# Patient Record
Sex: MALE | Race: WHITE | NOT HISPANIC OR LATINO | Employment: OTHER | URBAN - METROPOLITAN AREA
[De-identification: names, ages, dates, MRNs, and addresses within clinical notes are randomized per-mention and may not be internally consistent; named-entity substitution may affect disease eponyms.]

---

## 2017-01-17 PROBLEM — E11.00 TYPE 2 DIABETES MELLITUS WITH HYPEROSMOLARITY WITHOUT NONKETOTIC HYPERGLYCEMIC-HYPEROSMOLAR COMA (NKHHC) (HCC): Status: ACTIVE | Noted: 2017-01-17

## 2017-01-17 PROBLEM — I10 HYPERTENSION: Status: ACTIVE | Noted: 2017-01-17

## 2017-01-17 PROBLEM — Z91.14 NON COMPLIANCE W MEDICATION REGIMEN: Status: ACTIVE | Noted: 2017-01-17

## 2017-01-17 PROBLEM — K74.60 CIRRHOSIS (HCC): Status: ACTIVE | Noted: 2017-01-17

## 2017-01-17 PROBLEM — IMO0002 TYPE 1 DIABETES MELLITUS WITH NEUROLOGICAL MANIFESTATIONS, UNCONTROLLED: Status: ACTIVE | Noted: 2017-01-17

## 2017-01-17 PROBLEM — M19.90 ARTHRITIS: Status: ACTIVE | Noted: 2017-01-17

## 2017-01-17 PROBLEM — E78.5 HYPERLIPEMIA: Status: ACTIVE | Noted: 2017-01-17

## 2017-01-18 ENCOUNTER — OFFICE VISIT (OUTPATIENT)
Dept: ENDOCRINOLOGY | Facility: CLINIC | Age: 47
End: 2017-01-18

## 2017-01-18 VITALS
DIASTOLIC BLOOD PRESSURE: 96 MMHG | BODY MASS INDEX: 31.26 KG/M2 | HEART RATE: 94 BPM | OXYGEN SATURATION: 98 % | SYSTOLIC BLOOD PRESSURE: 154 MMHG | WEIGHT: 251.4 LBS | HEIGHT: 75 IN

## 2017-01-18 DIAGNOSIS — L97.509 DIABETIC FOOT ULCER ASSOCIATED WITH DIABETES MELLITUS DUE TO UNDERLYING CONDITION, UNSPECIFIED LATERALITY: ICD-10-CM

## 2017-01-18 DIAGNOSIS — E78.1 HYPERTRIGLYCERIDEMIA, ESSENTIAL: ICD-10-CM

## 2017-01-18 DIAGNOSIS — E08.621 DIABETIC FOOT ULCER ASSOCIATED WITH DIABETES MELLITUS DUE TO UNDERLYING CONDITION, UNSPECIFIED LATERALITY: ICD-10-CM

## 2017-01-18 DIAGNOSIS — E11.00 TYPE 2 DIABETES MELLITUS WITH HYPEROSMOLARITY WITHOUT NONKETOTIC HYPERGLYCEMIC-HYPEROSMOLAR COMA (NKHHC) (HCC): Primary | ICD-10-CM

## 2017-01-18 DIAGNOSIS — Z91.14 NON COMPLIANCE W MEDICATION REGIMEN: ICD-10-CM

## 2017-01-18 DIAGNOSIS — IMO0002 TYPE 2 DIABETES, UNCONTROLLED, WITH NEUROPATHY: ICD-10-CM

## 2017-01-18 PROCEDURE — 83036 HEMOGLOBIN GLYCOSYLATED A1C: CPT | Performed by: INTERNAL MEDICINE

## 2017-01-18 PROCEDURE — 99215 OFFICE O/P EST HI 40 MIN: CPT | Performed by: INTERNAL MEDICINE

## 2017-01-18 RX ORDER — INSULIN ASPART 100 [IU]/ML
5 INJECTION, SOLUTION INTRAVENOUS; SUBCUTANEOUS
COMMUNITY
Start: 2017-01-12 | End: 2017-02-03 | Stop reason: HOSPADM

## 2017-01-18 RX ORDER — GABAPENTIN 400 MG/1
1200 CAPSULE ORAL 3 TIMES DAILY
COMMUNITY
Start: 2017-01-05 | End: 2017-02-03 | Stop reason: HOSPADM

## 2017-01-18 RX ORDER — INSULIN GLARGINE 100 [IU]/ML
10 INJECTION, SOLUTION SUBCUTANEOUS 2 TIMES DAILY
COMMUNITY
End: 2017-01-18

## 2017-01-18 RX ORDER — BLOOD-GLUCOSE METER
KIT MISCELLANEOUS
COMMUNITY
Start: 2017-01-13 | End: 2017-01-25

## 2017-01-18 RX ORDER — HYDROCODONE BITARTRATE AND ACETAMINOPHEN 10; 325 MG/1; MG/1
1 TABLET ORAL 2 TIMES DAILY PRN
COMMUNITY
Start: 2017-01-17 | End: 2017-02-03 | Stop reason: HOSPADM

## 2017-01-18 NOTE — PATIENT INSTRUCTIONS
Diabetes Treatment Recommendations  Patient     Kendrick Crystal     Date:        01/18/17                               Your A1C is 14.9                                                             Glucose testing frequency:  2-3 times a day     Insulin dosing:  Basal insulin Lantus  /Toujeo  20 Units nightly. Increase by 2 units every 7 days until your morning numbers are < 200.              Meal Insulin Novolog   10 units before meal.     For snacks - take additional insulin if sugars are high.      Correction insulin (add to meal insulin or use with snacks)   0  unit  if BS  less than 250   4  unit   if BS  250 - 299   8  units if  - 349   10 units if  - 399   12 units if  - 449   15  units if BS Greater than 450                         Nutritional Recommendations:   4-5 carb portions per meal for male (60-75 gm of carbs)    Physical Activity Goals:  Walk at least 15 min every day, ideally exercise 45-60 min most days (could be done in short bouts of 10-15 minutes.    Keep records of your glucose levels and insulin adjustments. We may ask you to keep records on the content of your meals with insulin doses and before/after meal glucose levels to evaluate your ratios.  Call for advice if you have unexplained or unexpected hypoglycemia  (glucose < 60) or persistent high glucose > 300.  Office: [phone]      Callie Mcdonald MD

## 2017-01-18 NOTE — LETTER
Patient Instructions     Diabetes Treatment Recommendations  Patient     Kendrick Crystal     Date:        01/18/17                               Your A1C is 14.9                                                             Glucose testing frequency:  2-3 times a day     Insulin dosing:  Basal insulin Lantus  /Toujeo  20 Units nightly. Increase by 2 units every 7 days until your morning numbers are < 200.              Meal Insulin Novolog   10 units before meal.     For snacks - take additional insulin if sugars are high.      Correction insulin (add to meal insulin or use with snacks)   0  unit  if BS  less than 250   4  unit   if BS  250 - 299   8  units if  - 349   10 units if  - 399   12 units if  - 449   15  units if BS Greater than 450                         Nutritional Recommendations:   4-5 carb portions per meal for male (60-75 gm of carbs)    Physical Activity Goals:  Walk at least 15 min every day, ideally exercise 45-60 min most days (could be done in short bouts of 10-15 minutes.    Keep records of your glucose levels and insulin adjustments. We may ask you to keep records on the content of your meals with insulin doses and before/after meal glucose levels to evaluate your ratios.  Call for advice if you have unexplained or unexpected hypoglycemia  (glucose < 60) or persistent high glucose > 300.  Office: [phone]      MD Callie Chandra MD

## 2017-01-18 NOTE — MR AVS SNAPSHOT
Kendrick Crystal   1/18/2017 3:30 PM   Office Visit    Dept Phone:  [phone]   Encounter #:  [id number]    Provider:  Callie MCDONALD MD   Department:  [facility] INTERNAL MEDICINE AND ENDOCRINOLOGY                Your Full Care Plan              Today's Medication Changes          These changes are accurate as of: 1/18/17  3:16 PM.  If you have any questions, ask your nurse or doctor.               New Medication(s)Ordered:     Insulin Degludec 100 UNIT/ML solution pen-injector   Inject 20 Units under the skin Every Night.   Started by:  Callie MCDONALD MD         Stop taking medication(s)listed here:     insulin glargine 100 UNIT/ML injection   Commonly known as:  LANTUS   Stopped by:  Callie MCDONALD MD                Where to Get Your Medications      These medications were sent to [address] [place] [address] - [phone] [facility] [phone] [place] [address] [address] [address]     Phone:  [phone]     Insulin Degludec 100 UNIT/ML solution pen-injector                  Your Updated Medication List          This list is accurate as of: 1/18/17  3:16 PM.  Always use your most recent med list.                FREESTYLE LITE test strip   Generic drug:  glucose blood       gabapentin 400 MG capsule   Commonly known as:  NEURONTIN       HYDROcodone-acetaminophen  MG per tablet   Commonly known as:  NORCO       Insulin Degludec 100 UNIT/ML solution pen-injector   Inject 20 Units under the skin Every Night.       NOVOLOG FLEXPEN 100 UNIT/ML solution pen-injector sc pen   Generic drug:  insulin aspart               We Performed the Following     Ambulatory Referral to Endocrinology     Ambulatory Referral to Nephrology     POC Glucose Fingerstick     POC Glycosylated Hemoglobin (Hb A1C)     Wound Culture       You Were Diagnosed With        Codes Comments    Type 2 diabetes mellitus with hyperosmolarity  without nonketotic hyperglycemic-hyperosmolar coma (nkhhc)    -  Primary ICD-10-CM: E11.00  ICD-9-CM: 250.20     Type 2 diabetes, uncontrolled, with neuropathy     ICD-10-CM: E11.40, E11.65  ICD-9-CM: 250.62, 357.2     Hypertriglyceridemia, essential     ICD-10-CM: E78.1  ICD-9-CM: 272.1     Non compliance w medication regimen     ICD-10-CM: Z91.14  ICD-9-CM: V15.81       Instructions    Diabetes Treatment Recommendations  Patient     Kendrick Crystal     Date:        01/18/17                               Your A1C is 14.9                                                             Glucose testing frequency:  2-3 times a day     Insulin dosing:  Basal insulin Lantus  /Toujeo  20 Units nightly. Increase by 2 units every 7 days until your morning numbers are < 200.              Meal Insulin Novolog   10 units before meal.     For snacks - take additional insulin if sugars are high.      Correction insulin (add to meal insulin or use with snacks)   0  unit  if BS  less than 250   4  unit   if BS  250 - 299   8  units if  - 349   10 units if  - 399   12 units if  - 449   15  units if BS Greater than 450                         Nutritional Recommendations:   4-5 carb portions per meal for male (60-75 gm of carbs)    Physical Activity Goals:  Walk at least 15 min every day, ideally exercise 45-60 min most days (could be done in short bouts of 10-15 minutes.    Keep records of your glucose levels and insulin adjustments. We may ask you to keep records on the content of your meals with insulin doses and before/after meal glucose levels to evaluate your ratios.  Call for advice if you have unexplained or unexpected hypoglycemia  (glucose < 60) or persistent high glucose > 300.  Office: [phone]      Callie Mcdonald MD           Patient Instructions History      Upcoming Appointments     Visit Type Date Time Department    FOLLOW UP 1/18/2017  3:30 PM [facility]  "[employer] allows you to send messages to your doctor, view your test results, renew your prescriptions, schedule appointments, and more. To sign up, go to [employer] and click on the Sign Up Now link in the New User? box. Enter your [employer] Activation Code exactly as it appears below along with the last four digits of your Social Security Number and your Date of Birth () to complete the sign-up process. If you do not sign up before the expiration date, you must request a new code.    [employer] Activation Code: [email or web address]  Expires: 2017  3:16 PM    If you have questions, you can email [email or web address] or call [phone] to talk to our [employer] staff. Remember, [employer] is NOT to be used for urgent needs. For medical emergencies, dial 911.               Other Info from Your Visit           Your Appointments     2017  3:30 PM EST   Follow Up with Callie MCDONALD MD   [facility] INTERNAL MEDICINE AND ENDOCRINOLOGY (--)    [address] [place] [id number] [phone]           Arrive 15 minutes prior to appointment.              Allergies     No Known Allergies      Reason for Visit     Diabetes F/u for type 2 diabetes, testing 2x qd. Blood sugars ranging 300-400's      Vital Signs     Blood Pressure Pulse Height Weight Oxygen Saturation Body Mass Index    154/96 94 75\" (190.5 cm) 251 lb 6.4 oz (114 kg) 98% 31.42 kg/m2    Smoking Status                   Never Smoker           Problems and Diagnoses Noted     High blood triglycerides    Not taking medication as directed    Uncontrolled type I diabetes with nerve complications    Type 2 diabetes mellitus with hyperosmolarity without nonketotic hyperglycemic-hyperosmolar coma (nkhhc)        "

## 2017-01-18 NOTE — PROGRESS NOTES
Chief complaint  Diabetes (F/u for type 2 diabetes, testing 2x qd. Blood sugars ranging 300-400's)    Subjective   Kendrick Crystal is a 46 y.o. male is here today for follow-up.  Diabetes mellitus type 2, uncontrolled, with recent hospitalization with hyperglycemia after he stopped insulin.   Complications: neuropathy, retinopathy with left sided vision loss, feet ulcers.   Eye exam: Dr Jasmin Infante, received injections, right sided blurry vision, left side - near blindness.      Medications: He was on insulin pump in 2015. Patient has stopped insulin for about a year. His wife broke her leg and he lost follow-up.      He has lost weight, c/o polyuria and polydipsia, weakness, lethargy and confusion. Noticed foot ulcers with drainage on the left foot, lethargy, numbness of the feet, muscle weakness.     Monitoring:  He stopped checking  Nutrition: snacks through the day.  One large meal a day.   Meter: OneTouch.      Patient has a history of Non-alcoholic cirrhosis for unknown causes. He had liver biopsy in 10/03/2014 showing stage 4 fibrosis of the liver. He has multiple nodules on the liver and pancreas, LFT are stable on last GI visit.         Medications    Current Outpatient Prescriptions:   •  FREESTYLE LITE test strip, , Disp: , Rfl:   •  gabapentin (NEURONTIN) 400 MG capsule, , Disp: , Rfl:   •  HYDROcodone-acetaminophen (NORCO)  MG per tablet, , Disp: , Rfl:   •  Insulin Degludec 100 UNIT/ML solution pen-injector, Inject 20 Units under the skin Every Night., Disp: 5 pen, Rfl: 5  •  NOVOLOG FLEXPEN 100 UNIT/ML solution pen-injector sc pen, , Disp: , Rfl:     PMH  The following portions of the patient's history were reviewed and updated as appropriate: allergies, current medications, past family history, past medical history, past social history, past surgical history and problem list.    Review of systems  Review of Systems   Constitutional: Positive for chills, fatigue and unexpected weight change  "(Weight loss).   HENT: Positive for mouth sores.    Respiratory: Positive for shortness of breath.    Cardiovascular: Positive for leg swelling.   Gastrointestinal: Positive for diarrhea and nausea.   Endocrine: Positive for cold intolerance, heat intolerance, polydipsia and polyuria.   Musculoskeletal: Positive for arthralgias, back pain, gait problem and myalgias.   Neurological: Positive for dizziness, weakness, numbness and headaches.   Psychiatric/Behavioral: Positive for confusion, decreased concentration, dysphoric mood and sleep disturbance.   All other systems reviewed and are negative.      Physical exam  Objective   Blood pressure 154/96, pulse 94, height 75\" (190.5 cm), weight 251 lb 6.4 oz (114 kg), SpO2 98 %. Body mass index is 31.42 kg/(m^2).  Physical Exam   Constitutional: He is oriented to person, place, and time. He appears well-developed and well-nourished.   HENT:   Head: Normocephalic and atraumatic.   Nose: Nose normal.   Mouth/Throat: Oropharynx is clear and moist.   Eyes: Conjunctivae and EOM are normal. Pupils are equal, round, and reactive to light.   Neck: Normal range of motion. Neck supple. No thyromegaly present.   Cardiovascular: Normal rate, regular rhythm, normal heart sounds and intact distal pulses.    No murmur heard.  Pulmonary/Chest: Effort normal and breath sounds normal.   Abdominal: There is no hepatosplenomegaly.   No Hepatosplenomegaly   Musculoskeletal: Normal range of motion. He exhibits no edema, tenderness or deformity.    Kendrick had a diabetic foot exam performed today.   During the foot exam he had a monofilament test performed (numbness of both feet).    Neurological Sensory Findings - Altered hot/cold right ankle/foot discrimination and altered hot/cold left ankle/foot discrimination. Altered sharp/dull right ankle/foot discrimination and altered sharp/dull left ankle/foot discrimination.    Vascular Status -  His exam exhibits right foot vasculature normal and " right foot edema. His exam exhibits left foot vasculature normal and left foot edema.   Skin Integrity  -  His right foot skin is not intact (right 5 -7 mm dry ulcer with dark crust, no surrounding erythema and no discharge, ).     Kendrick 's left foot skin is not intact (7 mm left foot ulcers with yellowish discharge, the borders of the ulcer are erythematous. THe lesion is superficial. ). .     Lymphadenopathy:     He has no cervical adenopathy.   Neurological: He is alert and oriented to person, place, and time. He has normal reflexes.   Skin: Skin is warm and dry. No rash noted.   Psychiatric: He has a normal mood and affect. Judgment and thought content normal.   Nursing note and vitals reviewed.        LABS AND IMAGING  A1C was 14.9 in 1/2016        Assessment  Assessment/Plan   Problem List Items Addressed This Visit        Cardiovascular and Mediastinum    Hypertriglyceridemia, essential       Endocrine    Type 1 diabetes mellitus with neurological manifestations, uncontrolled    Relevant Medications    NOVOLOG FLEXPEN 100 UNIT/ML solution pen-injector sc pen    Insulin Degludec 100 UNIT/ML solution pen-injector    Type 2 diabetes mellitus with hyperosmolarity without nonketotic hyperglycemic-hyperosmolar coma (nkhhc) - Primary    Relevant Medications    NOVOLOG FLEXPEN 100 UNIT/ML solution pen-injector sc pen    Insulin Degludec 100 UNIT/ML solution pen-injector    Other Relevant Orders    POC Glucose Fingerstick    POC Glycosylated Hemoglobin (Hb A1C)    Ambulatory Referral to Nephrology    Comprehensive Metabolic Panel    Hemoglobin A1c    TSH    T4, Free    Microalbumin / Creatinine Urine Ratio    Ambulatory Referral to Endocrinology    Wound Culture    Ambulatory Referral to Podiatry    Diabetic foot ulcer associated with diabetes mellitus due to underlying condition    Relevant Medications    NOVOLOG FLEXPEN 100 UNIT/ML solution pen-injector sc pen    Insulin Degludec 100 UNIT/ML solution pen-injector        Other    Non-compliance          Plan  MDI started in the hospital but he didn't start taking insulin yet.     Start Tresiba  20 units at bedtime.   Novolog 10 units  With meal, additional correction.   He just got the medications and didn't start taking it. .   Meter provided.   Patient Instructions     Diabetes Treatment Recommendations  Patient     Kendrick Crystal     Date:        01/18/17                               Your A1C is 14.9                                                             Glucose testing frequency:  2-3 times a day     Insulin dosing:  Basal insulin Lantus  /Toujeo  20 Units nightly. Increase by 2 units every 7 days until your morning numbers are < 200.              Meal Insulin Novolog   10 units before meal.     For snacks - take additional insulin if sugars are high.      Correction insulin (add to meal insulin or use with snacks)   0  unit  if BS  less than 250   4  unit   if BS  250 - 299   8  units if  - 349   10 units if  - 399   12 units if  - 449   15  units if BS Greater than 450                         Nutritional Recommendations:   4-5 carb portions per meal for male (60-75 gm of carbs)    Physical Activity Goals:  Walk at least 15 min every day, ideally exercise 45-60 min most days (could be done in short bouts of 10-15 minutes.    Keep records of your glucose levels and insulin adjustments. We may ask you to keep records on the content of your meals with insulin doses and before/after meal glucose levels to evaluate your ratios.  Call for advice if you have unexplained or unexpected hypoglycemia  (glucose < 60) or persistent high glucose > 300.  Office: [phone]      Callie Mcdonald MD          I have reviewed diabetes in detail with the patient today. All questions have been answered to his satisfaction. We have discussed the goals of HgbA1C, glucose testing frequency, consistent carbohydrate diet  and diabetes complication prevention.   Patient was  recommended to increase exercise activity and follow ADA recommended diet. The literature with the examples of meal plan was provided to patient and healthy eating instructions reviewed.  We have discussed the following concepts:  long term diabetic complications discussed. , annual eye examinations at Ophthalmology discussed, foot care discussed and Podiatry evaluation recommended, dental hygiene discussed  and foot care reviewed., diabetic Sick Day rules reviewed, handout given: maintain increased fluid intake, check glucose frequently and call the office if glucose is >400. If nausea/vomiting occurs, present to the ER. , glucose meter dispensed to patient, home glucose monitoring demonstrated and taught, home glucose monitoring emphasized, all medications, side effects and compliance discussed carefully, use and side effects of insulin is taught, I will obtain fasting labs prior to next visit and Hypoglycemia management and prevention reviewed. His wife has asked about Genteel lancet device that is less painful. I have provided information of how to get it.     -refer to wound care sent.     I have obtained culture of the foot ulcer on the left, cleaned with hydrogen perxide and applied sterile gauge and dressing.       25 min  of 40 min face-to-face visit time spent for coordination of care and counselling regarding identified problems as outlined in the objective, assessment and discussion portions of the documentation.

## 2017-01-19 ENCOUNTER — CONVERSION ENCOUNTER (OUTPATIENT)
Dept: ENDOCRINOLOGY | Facility: CLINIC | Age: 47
End: 2017-01-19

## 2017-01-20 DIAGNOSIS — L97.509 DIABETIC FOOT ULCER ASSOCIATED WITH DIABETES MELLITUS DUE TO UNDERLYING CONDITION, UNSPECIFIED LATERALITY: Primary | ICD-10-CM

## 2017-01-20 DIAGNOSIS — E08.621 DIABETIC FOOT ULCER ASSOCIATED WITH DIABETES MELLITUS DUE TO UNDERLYING CONDITION, UNSPECIFIED LATERALITY: Primary | ICD-10-CM

## 2017-01-22 LAB
BACTERIA SPEC AEROBE CULT: ABNORMAL
BACTERIA SPEC CULT: ABNORMAL
BACTERIA SPEC CULT: ABNORMAL
Lab: NORMAL
OTHER ANTIBIOTIC SUSC ISLT: ABNORMAL
REQUEST PROBLEM: NORMAL

## 2017-01-23 LAB — HBA1C MFR BLD: 14.9 %

## 2017-01-24 ENCOUNTER — HOSPITAL ENCOUNTER (OUTPATIENT)
Dept: PHYSICAL THERAPY | Facility: HOSPITAL | Age: 47
Setting detail: THERAPIES SERIES
Discharge: HOME | End: 2017-01-24

## 2017-01-25 ENCOUNTER — APPOINTMENT (OUTPATIENT)
Dept: CT IMAGING | Facility: HOSPITAL | Age: 47
End: 2017-01-25

## 2017-01-25 ENCOUNTER — APPOINTMENT (OUTPATIENT)
Dept: MRI IMAGING | Facility: HOSPITAL | Age: 47
End: 2017-01-25

## 2017-01-25 ENCOUNTER — HOSPITAL ENCOUNTER (INPATIENT)
Facility: HOSPITAL | Age: 47
LOS: 9 days | Discharge: NURSING, REHAB OR LONG-TERM CARE | End: 2017-02-03
Attending: EMERGENCY MEDICINE | Admitting: INTERNAL MEDICINE

## 2017-01-25 ENCOUNTER — TELEPHONE (OUTPATIENT)
Dept: ENDOCRINOLOGY | Facility: CLINIC | Age: 47
End: 2017-01-25

## 2017-01-25 DIAGNOSIS — S91.302A OPEN WOUND OF LEFT FOOT WITH COMPLICATION, INITIAL ENCOUNTER: ICD-10-CM

## 2017-01-25 DIAGNOSIS — L03.116 CELLULITIS OF LEFT FOOT: Primary | ICD-10-CM

## 2017-01-25 PROBLEM — E87.1 HYPONATREMIA: Status: ACTIVE | Noted: 2017-01-25

## 2017-01-25 PROBLEM — E11.65 POORLY CONTROLLED TYPE 2 DIABETES MELLITUS WITH PERIPHERAL NEUROPATHY (HCC): Chronic | Status: ACTIVE | Noted: 2017-01-25

## 2017-01-25 PROBLEM — K75.81 NASH (NONALCOHOLIC STEATOHEPATITIS): Status: ACTIVE | Noted: 2017-01-25

## 2017-01-25 PROBLEM — D72.9 NEUTROPHILIC LEUKOCYTOSIS: Status: ACTIVE | Noted: 2017-01-25

## 2017-01-25 PROBLEM — Z79.4 TYPE 2 DIABETES MELLITUS WITH FOOT ULCER, WITH LONG-TERM CURRENT USE OF INSULIN (HCC): Status: ACTIVE | Noted: 2017-01-25

## 2017-01-25 PROBLEM — L97.509 TYPE 2 DIABETES MELLITUS WITH FOOT ULCER, WITH LONG-TERM CURRENT USE OF INSULIN (HCC): Status: ACTIVE | Noted: 2017-01-25

## 2017-01-25 PROBLEM — E11.42 POORLY CONTROLLED TYPE 2 DIABETES MELLITUS WITH PERIPHERAL NEUROPATHY (HCC): Chronic | Status: ACTIVE | Noted: 2017-01-25

## 2017-01-25 PROBLEM — E11.621 TYPE 2 DIABETES MELLITUS WITH FOOT ULCER, WITH LONG-TERM CURRENT USE OF INSULIN (HCC): Status: ACTIVE | Noted: 2017-01-25

## 2017-01-25 LAB
ALBUMIN SERPL-MCNC: 4 G/DL (ref 3.2–4.8)
ALBUMIN/GLOB SERPL: 1.4 G/DL (ref 1.5–2.5)
ALP SERPL-CCNC: 138 U/L (ref 25–100)
ALT SERPL W P-5'-P-CCNC: 21 U/L (ref 7–40)
ANION GAP SERPL CALCULATED.3IONS-SCNC: 12 MMOL/L (ref 3–11)
APTT PPP: 30.4 SECONDS (ref 24–31)
AST SERPL-CCNC: 13 U/L (ref 0–33)
BACTERIA UR QL AUTO: ABNORMAL /HPF
BASOPHILS # BLD AUTO: 0.03 10*3/MM3 (ref 0–0.2)
BASOPHILS NFR BLD AUTO: 0.1 % (ref 0–1)
BILIRUB SERPL-MCNC: 1.2 MG/DL (ref 0.3–1.2)
BILIRUB UR QL STRIP: ABNORMAL
BUN BLD-MCNC: 14 MG/DL (ref 9–23)
BUN/CREAT SERPL: 20 (ref 7–25)
CA-I SERPL ISE-MCNC: 1.14 MMOL/L (ref 1.12–1.32)
CALCIUM SPEC-SCNC: 9.5 MG/DL (ref 8.7–10.4)
CHLORIDE SERPL-SCNC: 93 MMOL/L (ref 99–109)
CLARITY UR: CLEAR
CO2 SERPL-SCNC: 27 MMOL/L (ref 20–31)
COLOR UR: ABNORMAL
CREAT BLD-MCNC: 0.7 MG/DL (ref 0.6–1.3)
CRP SERPL-MCNC: 188.4 MG/DL (ref 0–10)
D-LACTATE SERPL-SCNC: 1.3 MMOL/L (ref 0.5–2)
D-LACTATE SERPL-SCNC: 2 MMOL/L (ref 0.5–2)
DEPRECATED RDW RBC AUTO: 38.9 FL (ref 37–54)
EOSINOPHIL # BLD AUTO: 0.01 10*3/MM3 (ref 0.1–0.3)
EOSINOPHIL NFR BLD AUTO: 0 % (ref 0–3)
ERYTHROCYTE [DISTWIDTH] IN BLOOD BY AUTOMATED COUNT: 13 % (ref 11.3–14.5)
ERYTHROCYTE [SEDIMENTATION RATE] IN BLOOD: 61 MM/HR (ref 0–15)
GFR SERPL CREATININE-BSD FRML MDRD: 120 ML/MIN/1.73
GLOBULIN UR ELPH-MCNC: 2.8 GM/DL
GLUCOSE BLD-MCNC: 360 MG/DL (ref 70–100)
GLUCOSE BLDC GLUCOMTR-MCNC: 161 MG/DL (ref 70–130)
GLUCOSE BLDC GLUCOMTR-MCNC: 275 MG/DL (ref 70–130)
GLUCOSE BLDC GLUCOMTR-MCNC: 316 MG/DL (ref 70–130)
GLUCOSE BLDC GLUCOMTR-MCNC: 353 MG/DL (ref 70–130)
GLUCOSE UR STRIP-MCNC: ABNORMAL MG/DL
HBA1C MFR BLD: 13.5 % (ref 4.8–5.6)
HCT VFR BLD AUTO: 41.5 % (ref 38.9–50.9)
HGB BLD-MCNC: 14.7 G/DL (ref 13.1–17.5)
HGB UR QL STRIP.AUTO: NEGATIVE
HOLD SPECIMEN: NORMAL
HYALINE CASTS UR QL AUTO: ABNORMAL /LPF
IMM GRANULOCYTES # BLD: 0.1 10*3/MM3 (ref 0–0.03)
IMM GRANULOCYTES NFR BLD: 0.5 % (ref 0–0.6)
INR PPP: 1.04
KETONES UR QL STRIP: ABNORMAL
LEUKOCYTE ESTERASE UR QL STRIP.AUTO: NEGATIVE
LYMPHOCYTES # BLD AUTO: 1.56 10*3/MM3 (ref 0.6–4.8)
LYMPHOCYTES NFR BLD AUTO: 7.4 % (ref 24–44)
MAGNESIUM SERPL-MCNC: 1.7 MG/DL (ref 1.3–2.7)
MCH RBC QN AUTO: 29.5 PG (ref 27–31)
MCHC RBC AUTO-ENTMCNC: 35.4 G/DL (ref 32–36)
MCV RBC AUTO: 83.3 FL (ref 80–99)
MONOCYTES # BLD AUTO: 1.37 10*3/MM3 (ref 0–1)
MONOCYTES NFR BLD AUTO: 6.5 % (ref 0–12)
NEUTROPHILS # BLD AUTO: 18.04 10*3/MM3 (ref 1.5–8.3)
NEUTROPHILS NFR BLD AUTO: 85.5 % (ref 41–71)
NITRITE UR QL STRIP: NEGATIVE
OSMOLALITY SERPL: 289 MOSM/KG (ref 275–295)
OSMOLALITY UR: 849 MOSM/KG (ref 300–1100)
PH UR STRIP.AUTO: <=5 [PH] (ref 5–8)
PHOSPHATE SERPL-MCNC: 3 MG/DL (ref 2.4–5.1)
PLAT MORPH BLD: NORMAL
PLATELET # BLD AUTO: 198 10*3/MM3 (ref 150–450)
PMV BLD AUTO: 11.4 FL (ref 6–12)
POTASSIUM BLD-SCNC: 3.7 MMOL/L (ref 3.5–5.5)
PROCALCITONIN SERPL-MCNC: 0.25 NG/ML
PROT SERPL-MCNC: 6.8 G/DL (ref 5.7–8.2)
PROT UR QL STRIP: ABNORMAL
PROTHROMBIN TIME: 11.4 SECONDS (ref 9.6–11.5)
RBC # BLD AUTO: 4.98 10*6/MM3 (ref 4.2–5.76)
RBC # UR: ABNORMAL /HPF
RBC MORPH BLD: NORMAL
REF LAB TEST METHOD: ABNORMAL
SODIUM BLD-SCNC: 132 MMOL/L (ref 132–146)
SODIUM UR-SCNC: 19 MMOL/L (ref 30–90)
SP GR UR STRIP: >1.05 (ref 1–1.03)
SQUAMOUS #/AREA URNS HPF: ABNORMAL /HPF
UROBILINOGEN UR QL STRIP: ABNORMAL
WBC MORPH BLD: NORMAL
WBC NRBC COR # BLD: 21.11 10*3/MM3 (ref 3.5–10.8)
WBC UR QL AUTO: ABNORMAL /HPF
WHOLE BLOOD HOLD SPECIMEN: NORMAL
WHOLE BLOOD HOLD SPECIMEN: NORMAL

## 2017-01-25 PROCEDURE — 85007 BL SMEAR W/DIFF WBC COUNT: CPT

## 2017-01-25 PROCEDURE — 82962 GLUCOSE BLOOD TEST: CPT

## 2017-01-25 PROCEDURE — 83605 ASSAY OF LACTIC ACID: CPT | Performed by: PHYSICIAN ASSISTANT

## 2017-01-25 PROCEDURE — 93005 ELECTROCARDIOGRAM TRACING: CPT | Performed by: PHYSICIAN ASSISTANT

## 2017-01-25 PROCEDURE — A9577 INJ MULTIHANCE: HCPCS | Performed by: FAMILY MEDICINE

## 2017-01-25 PROCEDURE — 63710000001 INSULIN LISPRO (HUMAN) PER 5 UNITS: Performed by: PHYSICIAN ASSISTANT

## 2017-01-25 PROCEDURE — 85610 PROTHROMBIN TIME: CPT | Performed by: PHYSICIAN ASSISTANT

## 2017-01-25 PROCEDURE — 85652 RBC SED RATE AUTOMATED: CPT | Performed by: EMERGENCY MEDICINE

## 2017-01-25 PROCEDURE — 86140 C-REACTIVE PROTEIN: CPT | Performed by: EMERGENCY MEDICINE

## 2017-01-25 PROCEDURE — 99223 1ST HOSP IP/OBS HIGH 75: CPT | Performed by: FAMILY MEDICINE

## 2017-01-25 PROCEDURE — 73701 CT LOWER EXTREMITY W/DYE: CPT

## 2017-01-25 PROCEDURE — 84145 PROCALCITONIN (PCT): CPT | Performed by: PHYSICIAN ASSISTANT

## 2017-01-25 PROCEDURE — 85025 COMPLETE CBC W/AUTO DIFF WBC: CPT

## 2017-01-25 PROCEDURE — 84300 ASSAY OF URINE SODIUM: CPT | Performed by: PHYSICIAN ASSISTANT

## 2017-01-25 PROCEDURE — 85730 THROMBOPLASTIN TIME PARTIAL: CPT | Performed by: PHYSICIAN ASSISTANT

## 2017-01-25 PROCEDURE — 73720 MRI LWR EXTREMITY W/O&W/DYE: CPT

## 2017-01-25 PROCEDURE — 25010000002 HEPARIN (PORCINE) PER 1000 UNITS: Performed by: PHYSICIAN ASSISTANT

## 2017-01-25 PROCEDURE — 25010000002 VANCOMYCIN: Performed by: EMERGENCY MEDICINE

## 2017-01-25 PROCEDURE — 99284 EMERGENCY DEPT VISIT MOD MDM: CPT

## 2017-01-25 PROCEDURE — 80053 COMPREHEN METABOLIC PANEL: CPT

## 2017-01-25 PROCEDURE — 87040 BLOOD CULTURE FOR BACTERIA: CPT

## 2017-01-25 PROCEDURE — 82330 ASSAY OF CALCIUM: CPT | Performed by: PHYSICIAN ASSISTANT

## 2017-01-25 PROCEDURE — 83605 ASSAY OF LACTIC ACID: CPT

## 2017-01-25 PROCEDURE — 81001 URINALYSIS AUTO W/SCOPE: CPT | Performed by: PHYSICIAN ASSISTANT

## 2017-01-25 PROCEDURE — 83930 ASSAY OF BLOOD OSMOLALITY: CPT | Performed by: PHYSICIAN ASSISTANT

## 2017-01-25 PROCEDURE — 0 GADOBENATE DIMEGLUMINE 529 MG/ML SOLUTION: Performed by: FAMILY MEDICINE

## 2017-01-25 PROCEDURE — 84100 ASSAY OF PHOSPHORUS: CPT | Performed by: PHYSICIAN ASSISTANT

## 2017-01-25 PROCEDURE — 83036 HEMOGLOBIN GLYCOSYLATED A1C: CPT | Performed by: PHYSICIAN ASSISTANT

## 2017-01-25 PROCEDURE — 25010000003 CEFTRIAXONE PER 250 MG: Performed by: EMERGENCY MEDICINE

## 2017-01-25 PROCEDURE — 83935 ASSAY OF URINE OSMOLALITY: CPT | Performed by: PHYSICIAN ASSISTANT

## 2017-01-25 PROCEDURE — 0 IOPAMIDOL 61 % SOLUTION: Performed by: EMERGENCY MEDICINE

## 2017-01-25 PROCEDURE — 83735 ASSAY OF MAGNESIUM: CPT | Performed by: PHYSICIAN ASSISTANT

## 2017-01-25 PROCEDURE — 63710000001 INSULIN DETEMIR PER 5 UNITS: Performed by: PHYSICIAN ASSISTANT

## 2017-01-25 RX ORDER — VANCOMYCIN HYDROCHLORIDE 1 G/200ML
1000 INJECTION, SOLUTION INTRAVENOUS EVERY 12 HOURS
Status: DISCONTINUED | OUTPATIENT
Start: 2017-01-25 | End: 2017-01-25

## 2017-01-25 RX ORDER — SODIUM CHLORIDE 9 MG/ML
75 INJECTION, SOLUTION INTRAVENOUS CONTINUOUS
Status: ACTIVE | OUTPATIENT
Start: 2017-01-25 | End: 2017-01-26

## 2017-01-25 RX ORDER — POTASSIUM CHLORIDE 750 MG/1
40 CAPSULE, EXTENDED RELEASE ORAL AS NEEDED
Status: DISCONTINUED | OUTPATIENT
Start: 2017-01-25 | End: 2017-01-29

## 2017-01-25 RX ORDER — NICOTINE POLACRILEX 4 MG
15 LOZENGE BUCCAL
Status: DISCONTINUED | OUTPATIENT
Start: 2017-01-25 | End: 2017-02-03 | Stop reason: HOSPADM

## 2017-01-25 RX ORDER — ONDANSETRON 4 MG/1
4 TABLET, FILM COATED ORAL EVERY 6 HOURS PRN
Status: DISCONTINUED | OUTPATIENT
Start: 2017-01-25 | End: 2017-01-30 | Stop reason: SDUPTHER

## 2017-01-25 RX ORDER — SULFAMETHOXAZOLE AND TRIMETHOPRIM 800; 160 MG/1; MG/1
1 TABLET ORAL 2 TIMES DAILY
COMMUNITY
Start: 2017-01-19 | End: 2017-02-03 | Stop reason: HOSPADM

## 2017-01-25 RX ORDER — FAMOTIDINE 20 MG/1
20 TABLET, FILM COATED ORAL 2 TIMES DAILY
Status: DISCONTINUED | OUTPATIENT
Start: 2017-01-25 | End: 2017-02-03 | Stop reason: HOSPADM

## 2017-01-25 RX ORDER — CEFTRIAXONE SODIUM 2 G/50ML
2 INJECTION, SOLUTION INTRAVENOUS EVERY 24 HOURS
Status: DISCONTINUED | OUTPATIENT
Start: 2017-01-26 | End: 2017-01-26

## 2017-01-25 RX ORDER — GABAPENTIN 300 MG/1
1200 CAPSULE ORAL 3 TIMES DAILY
Status: DISCONTINUED | OUTPATIENT
Start: 2017-01-25 | End: 2017-02-03 | Stop reason: HOSPADM

## 2017-01-25 RX ORDER — DEXTROSE MONOHYDRATE 25 G/50ML
25 INJECTION, SOLUTION INTRAVENOUS
Status: DISCONTINUED | OUTPATIENT
Start: 2017-01-25 | End: 2017-02-03 | Stop reason: HOSPADM

## 2017-01-25 RX ORDER — SODIUM CHLORIDE 0.9 % (FLUSH) 0.9 %
1-10 SYRINGE (ML) INJECTION AS NEEDED
Status: DISCONTINUED | OUTPATIENT
Start: 2017-01-25 | End: 2017-02-03 | Stop reason: HOSPADM

## 2017-01-25 RX ORDER — VANCOMYCIN HYDROCHLORIDE 1 G/200ML
1000 INJECTION, SOLUTION INTRAVENOUS EVERY 12 HOURS
Status: DISCONTINUED | OUTPATIENT
Start: 2017-01-26 | End: 2017-01-31

## 2017-01-25 RX ORDER — CEFTRIAXONE SODIUM 2 G/50ML
2 INJECTION, SOLUTION INTRAVENOUS ONCE
Status: COMPLETED | OUTPATIENT
Start: 2017-01-25 | End: 2017-01-25

## 2017-01-25 RX ORDER — DOCUSATE SODIUM 100 MG/1
100 CAPSULE, LIQUID FILLED ORAL 2 TIMES DAILY
Status: DISCONTINUED | OUTPATIENT
Start: 2017-01-25 | End: 2017-02-03 | Stop reason: HOSPADM

## 2017-01-25 RX ORDER — ONDANSETRON 2 MG/ML
4 INJECTION INTRAMUSCULAR; INTRAVENOUS EVERY 6 HOURS PRN
Status: DISCONTINUED | OUTPATIENT
Start: 2017-01-25 | End: 2017-01-30 | Stop reason: SDUPTHER

## 2017-01-25 RX ORDER — HEPARIN SODIUM 5000 [USP'U]/ML
5000 INJECTION, SOLUTION INTRAVENOUS; SUBCUTANEOUS EVERY 12 HOURS SCHEDULED
Status: DISCONTINUED | OUTPATIENT
Start: 2017-01-25 | End: 2017-01-30 | Stop reason: SDUPTHER

## 2017-01-25 RX ORDER — POTASSIUM CHLORIDE 1.5 G/1.77G
40 POWDER, FOR SOLUTION ORAL AS NEEDED
Status: DISCONTINUED | OUTPATIENT
Start: 2017-01-25 | End: 2017-01-29

## 2017-01-25 RX ORDER — SODIUM CHLORIDE 0.9 % (FLUSH) 0.9 %
10 SYRINGE (ML) INJECTION AS NEEDED
Status: DISCONTINUED | OUTPATIENT
Start: 2017-01-25 | End: 2017-01-30 | Stop reason: SDUPTHER

## 2017-01-25 RX ORDER — HYDROCODONE BITARTRATE AND ACETAMINOPHEN 10; 325 MG/1; MG/1
1 TABLET ORAL EVERY 6 HOURS PRN
Status: DISCONTINUED | OUTPATIENT
Start: 2017-01-25 | End: 2017-02-03 | Stop reason: HOSPADM

## 2017-01-25 RX ORDER — POTASSIUM CHLORIDE 7.45 MG/ML
10 INJECTION INTRAVENOUS
Status: DISCONTINUED | OUTPATIENT
Start: 2017-01-25 | End: 2017-01-29

## 2017-01-25 RX ADMIN — INSULIN DETEMIR 10 UNITS: 100 INJECTION, SOLUTION SUBCUTANEOUS at 20:55

## 2017-01-25 RX ADMIN — VANCOMYCIN HYDROCHLORIDE 2000 MG: 1 INJECTION, POWDER, LYOPHILIZED, FOR SOLUTION INTRAVENOUS at 13:31

## 2017-01-25 RX ADMIN — HEPARIN SODIUM 5000 UNITS: 5000 INJECTION, SOLUTION INTRAVENOUS; SUBCUTANEOUS at 20:56

## 2017-01-25 RX ADMIN — CEFTRIAXONE SODIUM 2 G: 2 INJECTION, SOLUTION INTRAVENOUS at 13:19

## 2017-01-25 RX ADMIN — IOPAMIDOL 95 ML: 612 INJECTION, SOLUTION INTRAVENOUS at 13:00

## 2017-01-25 RX ADMIN — GADOBENATE DIMEGLUMINE 20 ML: 529 INJECTION, SOLUTION INTRAVENOUS at 18:50

## 2017-01-25 RX ADMIN — GABAPENTIN 1200 MG: 300 CAPSULE ORAL at 20:56

## 2017-01-25 RX ADMIN — FAMOTIDINE 20 MG: 20 TABLET ORAL at 20:56

## 2017-01-25 RX ADMIN — INSULIN LISPRO 6 UNITS: 100 INJECTION, SOLUTION INTRAVENOUS; SUBCUTANEOUS at 20:55

## 2017-01-25 RX ADMIN — SODIUM CHLORIDE 125 ML/HR: 9 INJECTION, SOLUTION INTRAVENOUS at 20:20

## 2017-01-25 RX ADMIN — HYDROCODONE BITARTRATE AND ACETAMINOPHEN 1 TABLET: 10; 325 TABLET ORAL at 17:49

## 2017-01-25 NOTE — TELEPHONE ENCOUNTER
If he has signs of worsening infection, then waiting even 1 day could lead to complications. Oral abx not always effective in tx wound infection. He still has to go to the ER

## 2017-01-25 NOTE — TELEPHONE ENCOUNTER
Called and informed wife to take pt to ER, wife expressed verbal understanding and stated that pt still has three more days left of Bactrim antibiotic. SKY

## 2017-01-25 NOTE — TELEPHONE ENCOUNTER
Received call from pt's wife Cindy pt was seen last on 01/18/17, he has a wound on his left leg, per wife the wound has gotten worse since the office visit, it has been draining profusely; it spread to the small toe, the toe turned black and has an odor to it. Pt has been in bed for the last two days he is vomiting and is running a low grade fever. Pt had an appt with wound care yesterday but was too sick to make it, so they rescheduled it to tomorrow, wife is really worried and wants to know what they should do right now. Pls advise.

## 2017-01-26 ENCOUNTER — APPOINTMENT (OUTPATIENT)
Dept: PHYSICAL THERAPY | Facility: HOSPITAL | Age: 47
End: 2017-01-26

## 2017-01-26 LAB
ALBUMIN SERPL-MCNC: 3.4 G/DL (ref 3.2–4.8)
ALBUMIN/GLOB SERPL: 1.2 G/DL (ref 1.5–2.5)
ALP SERPL-CCNC: 125 U/L (ref 25–100)
ALT SERPL W P-5'-P-CCNC: 18 U/L (ref 7–40)
ANION GAP SERPL CALCULATED.3IONS-SCNC: 12 MMOL/L (ref 3–11)
AST SERPL-CCNC: 16 U/L (ref 0–33)
BASOPHILS # BLD AUTO: 0.03 10*3/MM3 (ref 0–0.2)
BASOPHILS NFR BLD AUTO: 0.2 % (ref 0–1)
BILIRUB SERPL-MCNC: 0.4 MG/DL (ref 0.3–1.2)
BUN BLD-MCNC: 16 MG/DL (ref 9–23)
BUN/CREAT SERPL: 26.7 (ref 7–25)
CALCIUM SPEC-SCNC: 8.6 MG/DL (ref 8.7–10.4)
CHLORIDE SERPL-SCNC: 94 MMOL/L (ref 99–109)
CO2 SERPL-SCNC: 23 MMOL/L (ref 20–31)
CREAT BLD-MCNC: 0.6 MG/DL (ref 0.6–1.3)
DEPRECATED RDW RBC AUTO: 39.4 FL (ref 37–54)
EOSINOPHIL # BLD AUTO: 0.03 10*3/MM3 (ref 0.1–0.3)
EOSINOPHIL NFR BLD AUTO: 0.2 % (ref 0–3)
ERYTHROCYTE [DISTWIDTH] IN BLOOD BY AUTOMATED COUNT: 13 % (ref 11.3–14.5)
GFR SERPL CREATININE-BSD FRML MDRD: 144 ML/MIN/1.73
GLOBULIN UR ELPH-MCNC: 2.9 GM/DL
GLUCOSE BLD-MCNC: 340 MG/DL (ref 70–100)
GLUCOSE BLDC GLUCOMTR-MCNC: 105 MG/DL (ref 70–130)
GLUCOSE BLDC GLUCOMTR-MCNC: 294 MG/DL (ref 70–130)
GLUCOSE BLDC GLUCOMTR-MCNC: 299 MG/DL (ref 70–130)
GLUCOSE BLDC GLUCOMTR-MCNC: 330 MG/DL (ref 70–130)
GLUCOSE BLDC GLUCOMTR-MCNC: 444 MG/DL (ref 70–130)
HCT VFR BLD AUTO: 36.8 % (ref 38.9–50.9)
HGB BLD-MCNC: 12.7 G/DL (ref 13.1–17.5)
IMM GRANULOCYTES # BLD: 0.07 10*3/MM3 (ref 0–0.03)
IMM GRANULOCYTES NFR BLD: 0.4 % (ref 0–0.6)
LYMPHOCYTES # BLD AUTO: 2.08 10*3/MM3 (ref 0.6–4.8)
LYMPHOCYTES NFR BLD AUTO: 11.2 % (ref 24–44)
MCH RBC QN AUTO: 28.7 PG (ref 27–31)
MCHC RBC AUTO-ENTMCNC: 34.5 G/DL (ref 32–36)
MCV RBC AUTO: 83.3 FL (ref 80–99)
MONOCYTES # BLD AUTO: 1.15 10*3/MM3 (ref 0–1)
MONOCYTES NFR BLD AUTO: 6.2 % (ref 0–12)
NEUTROPHILS # BLD AUTO: 15.17 10*3/MM3 (ref 1.5–8.3)
NEUTROPHILS NFR BLD AUTO: 81.8 % (ref 41–71)
PLAT MORPH BLD: NORMAL
PLATELET # BLD AUTO: 167 10*3/MM3 (ref 150–450)
PMV BLD AUTO: 11.8 FL (ref 6–12)
POTASSIUM BLD-SCNC: 3.5 MMOL/L (ref 3.5–5.5)
PROT SERPL-MCNC: 6.3 G/DL (ref 5.7–8.2)
RBC # BLD AUTO: 4.42 10*6/MM3 (ref 4.2–5.76)
RBC MORPH BLD: NORMAL
SODIUM BLD-SCNC: 129 MMOL/L (ref 132–146)
WBC MORPH BLD: NORMAL
WBC NRBC COR # BLD: 18.53 10*3/MM3 (ref 3.5–10.8)

## 2017-01-26 PROCEDURE — 99253 IP/OBS CNSLTJ NEW/EST LOW 45: CPT | Performed by: THORACIC SURGERY (CARDIOTHORACIC VASCULAR SURGERY)

## 2017-01-26 PROCEDURE — 63710000001 INSULIN LISPRO (HUMAN) PER 5 UNITS: Performed by: INTERNAL MEDICINE

## 2017-01-26 PROCEDURE — 25010000002 PIPERACILLIN SOD-TAZOBACTAM PER 1 G: Performed by: INTERNAL MEDICINE

## 2017-01-26 PROCEDURE — 25010000003 CEFTRIAXONE PER 250 MG: Performed by: PHYSICIAN ASSISTANT

## 2017-01-26 PROCEDURE — 25010000002 VANCOMYCIN PER 500 MG

## 2017-01-26 PROCEDURE — 80053 COMPREHEN METABOLIC PANEL: CPT | Performed by: PHYSICIAN ASSISTANT

## 2017-01-26 PROCEDURE — 82962 GLUCOSE BLOOD TEST: CPT

## 2017-01-26 PROCEDURE — 25010000002 HEPARIN (PORCINE) PER 1000 UNITS: Performed by: PHYSICIAN ASSISTANT

## 2017-01-26 PROCEDURE — 63710000001 INSULIN LISPRO (HUMAN) PER 5 UNITS: Performed by: PHYSICIAN ASSISTANT

## 2017-01-26 PROCEDURE — 85007 BL SMEAR W/DIFF WBC COUNT: CPT | Performed by: FAMILY MEDICINE

## 2017-01-26 PROCEDURE — 85025 COMPLETE CBC W/AUTO DIFF WBC: CPT | Performed by: FAMILY MEDICINE

## 2017-01-26 PROCEDURE — 99232 SBSQ HOSP IP/OBS MODERATE 35: CPT | Performed by: INTERNAL MEDICINE

## 2017-01-26 PROCEDURE — 63710000001 INSULIN DETEMIR PER 5 UNITS: Performed by: INTERNAL MEDICINE

## 2017-01-26 PROCEDURE — 93010 ELECTROCARDIOGRAM REPORT: CPT | Performed by: INTERNAL MEDICINE

## 2017-01-26 RX ORDER — LACTOBACILLUS RHAMNOSUS GG 10B CELL
1 CAPSULE ORAL DAILY
Status: DISCONTINUED | OUTPATIENT
Start: 2017-01-26 | End: 2017-02-03 | Stop reason: HOSPADM

## 2017-01-26 RX ORDER — POVIDONE-IODINE 10 MG/G
OINTMENT TOPICAL DAILY
Status: DISCONTINUED | OUTPATIENT
Start: 2017-01-26 | End: 2017-02-03 | Stop reason: HOSPADM

## 2017-01-26 RX ADMIN — TAZOBACTAM SODIUM AND PIPERACILLIN SODIUM 4.5 G: 500; 4 INJECTION, SOLUTION INTRAVENOUS at 15:47

## 2017-01-26 RX ADMIN — GABAPENTIN 1200 MG: 300 CAPSULE ORAL at 21:25

## 2017-01-26 RX ADMIN — DOCUSATE SODIUM 100 MG: 100 CAPSULE, LIQUID FILLED ORAL at 17:05

## 2017-01-26 RX ADMIN — HYDROCODONE BITARTRATE AND ACETAMINOPHEN 1 TABLET: 10; 325 TABLET ORAL at 21:25

## 2017-01-26 RX ADMIN — HEPARIN SODIUM 5000 UNITS: 5000 INJECTION, SOLUTION INTRAVENOUS; SUBCUTANEOUS at 21:25

## 2017-01-26 RX ADMIN — GABAPENTIN 1200 MG: 300 CAPSULE ORAL at 15:52

## 2017-01-26 RX ADMIN — INSULIN LISPRO 10 UNITS: 100 INJECTION, SOLUTION INTRAVENOUS; SUBCUTANEOUS at 12:05

## 2017-01-26 RX ADMIN — TAZOBACTAM SODIUM AND PIPERACILLIN SODIUM 4.5 G: 500; 4 INJECTION, SOLUTION INTRAVENOUS at 21:26

## 2017-01-26 RX ADMIN — FAMOTIDINE 20 MG: 20 TABLET ORAL at 17:05

## 2017-01-26 RX ADMIN — HYDROCODONE BITARTRATE AND ACETAMINOPHEN 1 TABLET: 10; 325 TABLET ORAL at 14:07

## 2017-01-26 RX ADMIN — VANCOMYCIN HYDROCHLORIDE 1000 MG: 1 INJECTION, SOLUTION INTRAVENOUS at 01:53

## 2017-01-26 RX ADMIN — VANCOMYCIN HYDROCHLORIDE 1000 MG: 1 INJECTION, SOLUTION INTRAVENOUS at 12:05

## 2017-01-26 RX ADMIN — INSULIN LISPRO 10 UNITS: 100 INJECTION, SOLUTION INTRAVENOUS; SUBCUTANEOUS at 17:00

## 2017-01-26 RX ADMIN — INSULIN LISPRO 7 UNITS: 100 INJECTION, SOLUTION INTRAVENOUS; SUBCUTANEOUS at 17:01

## 2017-01-26 RX ADMIN — INSULIN LISPRO 7 UNITS: 100 INJECTION, SOLUTION INTRAVENOUS; SUBCUTANEOUS at 08:24

## 2017-01-26 RX ADMIN — INSULIN DETEMIR 15 UNITS: 100 INJECTION, SOLUTION SUBCUTANEOUS at 21:26

## 2017-01-26 RX ADMIN — INSULIN LISPRO 10 UNITS: 100 INJECTION, SOLUTION INTRAVENOUS; SUBCUTANEOUS at 08:23

## 2017-01-26 RX ADMIN — FAMOTIDINE 20 MG: 20 TABLET ORAL at 08:22

## 2017-01-26 RX ADMIN — CEFTRIAXONE SODIUM 2 G: 2 INJECTION, SOLUTION INTRAVENOUS at 08:22

## 2017-01-26 RX ADMIN — GABAPENTIN 1200 MG: 300 CAPSULE ORAL at 08:22

## 2017-01-26 RX ADMIN — HEPARIN SODIUM 5000 UNITS: 5000 INJECTION, SOLUTION INTRAVENOUS; SUBCUTANEOUS at 08:23

## 2017-01-26 RX ADMIN — INSULIN LISPRO 4 UNITS: 100 INJECTION, SOLUTION INTRAVENOUS; SUBCUTANEOUS at 12:06

## 2017-01-26 RX ADMIN — DOCUSATE SODIUM 100 MG: 100 CAPSULE, LIQUID FILLED ORAL at 08:23

## 2017-01-26 RX ADMIN — POVIDONE-IODINE: 1 OINTMENT TOPICAL at 13:30

## 2017-01-26 RX ADMIN — Medication 1 CAPSULE: at 15:52

## 2017-01-27 ENCOUNTER — APPOINTMENT (OUTPATIENT)
Dept: CARDIOLOGY | Facility: HOSPITAL | Age: 47
End: 2017-01-27
Attending: INTERNAL MEDICINE

## 2017-01-27 ENCOUNTER — APPOINTMENT (OUTPATIENT)
Dept: GENERAL RADIOLOGY | Facility: HOSPITAL | Age: 47
End: 2017-01-27
Attending: INTERNAL MEDICINE

## 2017-01-27 LAB
GLUCOSE BLDC GLUCOMTR-MCNC: 261 MG/DL (ref 70–130)
GLUCOSE BLDC GLUCOMTR-MCNC: 351 MG/DL (ref 70–130)
GLUCOSE BLDC GLUCOMTR-MCNC: 391 MG/DL (ref 70–130)
GLUCOSE BLDC GLUCOMTR-MCNC: 410 MG/DL (ref 70–130)
VANCOMYCIN TROUGH SERPL-MCNC: 9 MCG/ML (ref 10–20)

## 2017-01-27 PROCEDURE — 82962 GLUCOSE BLOOD TEST: CPT

## 2017-01-27 PROCEDURE — 25010000002 HEPARIN (PORCINE) PER 1000 UNITS: Performed by: PHYSICIAN ASSISTANT

## 2017-01-27 PROCEDURE — 25010000002 PIPERACILLIN SOD-TAZOBACTAM PER 1 G: Performed by: INTERNAL MEDICINE

## 2017-01-27 PROCEDURE — 99232 SBSQ HOSP IP/OBS MODERATE 35: CPT | Performed by: INTERNAL MEDICINE

## 2017-01-27 PROCEDURE — 71020 HC CHEST PA AND LATERAL: CPT

## 2017-01-27 PROCEDURE — G0108 DIAB MANAGE TRN  PER INDIV: HCPCS | Performed by: REGISTERED NURSE

## 2017-01-27 PROCEDURE — 63710000001 INSULIN DETEMIR PER 5 UNITS: Performed by: INTERNAL MEDICINE

## 2017-01-27 PROCEDURE — 25010000002 VANCOMYCIN PER 500 MG

## 2017-01-27 PROCEDURE — 80202 ASSAY OF VANCOMYCIN: CPT

## 2017-01-27 RX ORDER — BENZONATATE 100 MG/1
100 CAPSULE ORAL 3 TIMES DAILY PRN
Status: DISCONTINUED | OUTPATIENT
Start: 2017-01-27 | End: 2017-02-03 | Stop reason: HOSPADM

## 2017-01-27 RX ADMIN — VANCOMYCIN HYDROCHLORIDE 1000 MG: 1 INJECTION, SOLUTION INTRAVENOUS at 23:50

## 2017-01-27 RX ADMIN — HYDROCODONE BITARTRATE AND ACETAMINOPHEN 1 TABLET: 10; 325 TABLET ORAL at 23:02

## 2017-01-27 RX ADMIN — GABAPENTIN 1200 MG: 300 CAPSULE ORAL at 20:59

## 2017-01-27 RX ADMIN — INSULIN LISPRO 10 UNITS: 100 INJECTION, SOLUTION INTRAVENOUS; SUBCUTANEOUS at 13:39

## 2017-01-27 RX ADMIN — VANCOMYCIN HYDROCHLORIDE 1000 MG: 1 INJECTION, SOLUTION INTRAVENOUS at 14:11

## 2017-01-27 RX ADMIN — POVIDONE-IODINE: 1 OINTMENT TOPICAL at 08:43

## 2017-01-27 RX ADMIN — DOCUSATE SODIUM 100 MG: 100 CAPSULE, LIQUID FILLED ORAL at 17:33

## 2017-01-27 RX ADMIN — HYDROCODONE BITARTRATE AND ACETAMINOPHEN 1 TABLET: 10; 325 TABLET ORAL at 16:42

## 2017-01-27 RX ADMIN — INSULIN LISPRO 6 UNITS: 100 INJECTION, SOLUTION INTRAVENOUS; SUBCUTANEOUS at 13:40

## 2017-01-27 RX ADMIN — HEPARIN SODIUM 5000 UNITS: 5000 INJECTION, SOLUTION INTRAVENOUS; SUBCUTANEOUS at 08:43

## 2017-01-27 RX ADMIN — INSULIN LISPRO 10 UNITS: 100 INJECTION, SOLUTION INTRAVENOUS; SUBCUTANEOUS at 08:43

## 2017-01-27 RX ADMIN — BENZONATATE 100 MG: 100 CAPSULE, LIQUID FILLED ORAL at 10:53

## 2017-01-27 RX ADMIN — TAZOBACTAM SODIUM AND PIPERACILLIN SODIUM 4.5 G: 500; 4 INJECTION, SOLUTION INTRAVENOUS at 15:21

## 2017-01-27 RX ADMIN — Medication 1 CAPSULE: at 08:43

## 2017-01-27 RX ADMIN — VANCOMYCIN HYDROCHLORIDE 1000 MG: 1 INJECTION, SOLUTION INTRAVENOUS at 03:19

## 2017-01-27 RX ADMIN — TAZOBACTAM SODIUM AND PIPERACILLIN SODIUM 4.5 G: 500; 4 INJECTION, SOLUTION INTRAVENOUS at 06:44

## 2017-01-27 RX ADMIN — INSULIN DETEMIR 15 UNITS: 100 INJECTION, SOLUTION SUBCUTANEOUS at 20:59

## 2017-01-27 RX ADMIN — INSULIN LISPRO 10 UNITS: 100 INJECTION, SOLUTION INTRAVENOUS; SUBCUTANEOUS at 17:34

## 2017-01-27 RX ADMIN — FAMOTIDINE 20 MG: 20 TABLET ORAL at 17:33

## 2017-01-27 RX ADMIN — FAMOTIDINE 20 MG: 20 TABLET ORAL at 08:43

## 2017-01-27 RX ADMIN — TAZOBACTAM SODIUM AND PIPERACILLIN SODIUM 4.5 G: 500; 4 INJECTION, SOLUTION INTRAVENOUS at 23:02

## 2017-01-27 RX ADMIN — GABAPENTIN 1200 MG: 300 CAPSULE ORAL at 08:42

## 2017-01-27 RX ADMIN — INSULIN LISPRO 9 UNITS: 100 INJECTION, SOLUTION INTRAVENOUS; SUBCUTANEOUS at 08:44

## 2017-01-27 RX ADMIN — BENZONATATE 100 MG: 100 CAPSULE, LIQUID FILLED ORAL at 23:50

## 2017-01-27 RX ADMIN — GABAPENTIN 1200 MG: 300 CAPSULE ORAL at 16:50

## 2017-01-27 RX ADMIN — INSULIN LISPRO 8 UNITS: 100 INJECTION, SOLUTION INTRAVENOUS; SUBCUTANEOUS at 17:38

## 2017-01-27 RX ADMIN — HEPARIN SODIUM 5000 UNITS: 5000 INJECTION, SOLUTION INTRAVENOUS; SUBCUTANEOUS at 20:59

## 2017-01-27 RX ADMIN — DOCUSATE SODIUM 100 MG: 100 CAPSULE, LIQUID FILLED ORAL at 08:43

## 2017-01-28 ENCOUNTER — APPOINTMENT (OUTPATIENT)
Dept: CARDIOLOGY | Facility: HOSPITAL | Age: 47
End: 2017-01-28
Attending: INTERNAL MEDICINE

## 2017-01-28 LAB
ANION GAP SERPL CALCULATED.3IONS-SCNC: 8 MMOL/L (ref 3–11)
BASOPHILS # BLD AUTO: 0.02 10*3/MM3 (ref 0–0.2)
BASOPHILS NFR BLD AUTO: 0.1 % (ref 0–1)
BUN BLD-MCNC: 11 MG/DL (ref 9–23)
BUN/CREAT SERPL: 18.3 (ref 7–25)
CALCIUM SPEC-SCNC: 8.6 MG/DL (ref 8.7–10.4)
CHLORIDE SERPL-SCNC: 95 MMOL/L (ref 99–109)
CO2 SERPL-SCNC: 29 MMOL/L (ref 20–31)
CREAT BLD-MCNC: 0.6 MG/DL (ref 0.6–1.3)
DEPRECATED RDW RBC AUTO: 39.2 FL (ref 37–54)
EOSINOPHIL # BLD AUTO: 0.01 10*3/MM3 (ref 0.1–0.3)
EOSINOPHIL NFR BLD AUTO: 0.1 % (ref 0–3)
ERYTHROCYTE [DISTWIDTH] IN BLOOD BY AUTOMATED COUNT: 12.9 % (ref 11.3–14.5)
GFR SERPL CREATININE-BSD FRML MDRD: 144 ML/MIN/1.73
GLUCOSE BLD-MCNC: 376 MG/DL (ref 70–100)
GLUCOSE BLDC GLUCOMTR-MCNC: 226 MG/DL (ref 70–130)
GLUCOSE BLDC GLUCOMTR-MCNC: 269 MG/DL (ref 70–130)
GLUCOSE BLDC GLUCOMTR-MCNC: 305 MG/DL (ref 70–130)
GLUCOSE BLDC GLUCOMTR-MCNC: 400 MG/DL (ref 70–130)
HCT VFR BLD AUTO: 36 % (ref 38.9–50.9)
HGB BLD-MCNC: 12.4 G/DL (ref 13.1–17.5)
IMM GRANULOCYTES # BLD: 0.06 10*3/MM3 (ref 0–0.03)
IMM GRANULOCYTES NFR BLD: 0.4 % (ref 0–0.6)
LYMPHOCYTES # BLD AUTO: 1.43 10*3/MM3 (ref 0.6–4.8)
LYMPHOCYTES NFR BLD AUTO: 9.2 % (ref 24–44)
MCH RBC QN AUTO: 28.8 PG (ref 27–31)
MCHC RBC AUTO-ENTMCNC: 34.4 G/DL (ref 32–36)
MCV RBC AUTO: 83.5 FL (ref 80–99)
MONOCYTES # BLD AUTO: 1.47 10*3/MM3 (ref 0–1)
MONOCYTES NFR BLD AUTO: 9.4 % (ref 0–12)
NEUTROPHILS # BLD AUTO: 12.57 10*3/MM3 (ref 1.5–8.3)
NEUTROPHILS NFR BLD AUTO: 80.8 % (ref 41–71)
PLATELET # BLD AUTO: 188 10*3/MM3 (ref 150–450)
PMV BLD AUTO: 11.3 FL (ref 6–12)
POTASSIUM BLD-SCNC: 3.5 MMOL/L (ref 3.5–5.5)
RBC # BLD AUTO: 4.31 10*6/MM3 (ref 4.2–5.76)
SODIUM BLD-SCNC: 132 MMOL/L (ref 132–146)
WBC NRBC COR # BLD: 15.56 10*3/MM3 (ref 3.5–10.8)

## 2017-01-28 PROCEDURE — 87205 SMEAR GRAM STAIN: CPT | Performed by: INTERNAL MEDICINE

## 2017-01-28 PROCEDURE — 87186 SC STD MICRODIL/AGAR DIL: CPT | Performed by: INTERNAL MEDICINE

## 2017-01-28 PROCEDURE — 99231 SBSQ HOSP IP/OBS SF/LOW 25: CPT | Performed by: THORACIC SURGERY (CARDIOTHORACIC VASCULAR SURGERY)

## 2017-01-28 PROCEDURE — 87077 CULTURE AEROBIC IDENTIFY: CPT | Performed by: INTERNAL MEDICINE

## 2017-01-28 PROCEDURE — 85025 COMPLETE CBC W/AUTO DIFF WBC: CPT | Performed by: INTERNAL MEDICINE

## 2017-01-28 PROCEDURE — 87147 CULTURE TYPE IMMUNOLOGIC: CPT | Performed by: INTERNAL MEDICINE

## 2017-01-28 PROCEDURE — 63710000001 INSULIN DETEMIR PER 5 UNITS: Performed by: INTERNAL MEDICINE

## 2017-01-28 PROCEDURE — 99233 SBSQ HOSP IP/OBS HIGH 50: CPT | Performed by: INTERNAL MEDICINE

## 2017-01-28 PROCEDURE — 25010000002 VANCOMYCIN PER 500 MG

## 2017-01-28 PROCEDURE — 25010000002 PIPERACILLIN SOD-TAZOBACTAM PER 1 G: Performed by: INTERNAL MEDICINE

## 2017-01-28 PROCEDURE — 87070 CULTURE OTHR SPECIMN AEROBIC: CPT | Performed by: INTERNAL MEDICINE

## 2017-01-28 PROCEDURE — 82962 GLUCOSE BLOOD TEST: CPT

## 2017-01-28 PROCEDURE — 93923 UPR/LXTR ART STDY 3+ LVLS: CPT | Performed by: INTERNAL MEDICINE

## 2017-01-28 PROCEDURE — 80048 BASIC METABOLIC PNL TOTAL CA: CPT | Performed by: INTERNAL MEDICINE

## 2017-01-28 PROCEDURE — 93923 UPR/LXTR ART STDY 3+ LVLS: CPT

## 2017-01-28 PROCEDURE — 25010000002 HEPARIN (PORCINE) PER 1000 UNITS: Performed by: PHYSICIAN ASSISTANT

## 2017-01-28 RX ORDER — LISINOPRIL 5 MG/1
5 TABLET ORAL
Status: DISCONTINUED | OUTPATIENT
Start: 2017-01-28 | End: 2017-02-01

## 2017-01-28 RX ORDER — ASPIRIN 81 MG/1
81 TABLET ORAL DAILY
Status: DISCONTINUED | OUTPATIENT
Start: 2017-01-28 | End: 2017-02-03 | Stop reason: HOSPADM

## 2017-01-28 RX ORDER — ATORVASTATIN CALCIUM 40 MG/1
40 TABLET, FILM COATED ORAL NIGHTLY
Status: DISCONTINUED | OUTPATIENT
Start: 2017-01-28 | End: 2017-02-03 | Stop reason: HOSPADM

## 2017-01-28 RX ADMIN — GABAPENTIN 1200 MG: 300 CAPSULE ORAL at 16:02

## 2017-01-28 RX ADMIN — POVIDONE-IODINE: 1 OINTMENT TOPICAL at 12:28

## 2017-01-28 RX ADMIN — HEPARIN SODIUM 5000 UNITS: 5000 INJECTION, SOLUTION INTRAVENOUS; SUBCUTANEOUS at 08:59

## 2017-01-28 RX ADMIN — ASPIRIN 81 MG: 81 TABLET, COATED ORAL at 12:35

## 2017-01-28 RX ADMIN — INSULIN LISPRO 10 UNITS: 100 INJECTION, SOLUTION INTRAVENOUS; SUBCUTANEOUS at 08:59

## 2017-01-28 RX ADMIN — INSULIN LISPRO 10 UNITS: 100 INJECTION, SOLUTION INTRAVENOUS; SUBCUTANEOUS at 17:44

## 2017-01-28 RX ADMIN — INSULIN LISPRO 7 UNITS: 100 INJECTION, SOLUTION INTRAVENOUS; SUBCUTANEOUS at 12:42

## 2017-01-28 RX ADMIN — INSULIN DETEMIR 15 UNITS: 100 INJECTION, SOLUTION SUBCUTANEOUS at 12:35

## 2017-01-28 RX ADMIN — TAZOBACTAM SODIUM AND PIPERACILLIN SODIUM 4.5 G: 500; 4 INJECTION, SOLUTION INTRAVENOUS at 16:03

## 2017-01-28 RX ADMIN — GABAPENTIN 1200 MG: 300 CAPSULE ORAL at 22:27

## 2017-01-28 RX ADMIN — DOCUSATE SODIUM 100 MG: 100 CAPSULE, LIQUID FILLED ORAL at 08:59

## 2017-01-28 RX ADMIN — VANCOMYCIN HYDROCHLORIDE 1000 MG: 1 INJECTION, SOLUTION INTRAVENOUS at 12:35

## 2017-01-28 RX ADMIN — BENZONATATE 100 MG: 100 CAPSULE, LIQUID FILLED ORAL at 22:28

## 2017-01-28 RX ADMIN — HEPARIN SODIUM 5000 UNITS: 5000 INJECTION, SOLUTION INTRAVENOUS; SUBCUTANEOUS at 22:28

## 2017-01-28 RX ADMIN — LISINOPRIL 5 MG: 5 TABLET ORAL at 12:35

## 2017-01-28 RX ADMIN — TAZOBACTAM SODIUM AND PIPERACILLIN SODIUM 4.5 G: 500; 4 INJECTION, SOLUTION INTRAVENOUS at 22:32

## 2017-01-28 RX ADMIN — FAMOTIDINE 20 MG: 20 TABLET ORAL at 17:43

## 2017-01-28 RX ADMIN — FAMOTIDINE 20 MG: 20 TABLET ORAL at 08:59

## 2017-01-28 RX ADMIN — INSULIN LISPRO 8 UNITS: 100 INJECTION, SOLUTION INTRAVENOUS; SUBCUTANEOUS at 09:03

## 2017-01-28 RX ADMIN — GABAPENTIN 1200 MG: 300 CAPSULE ORAL at 08:59

## 2017-01-28 RX ADMIN — ATORVASTATIN CALCIUM 40 MG: 40 TABLET, FILM COATED ORAL at 22:28

## 2017-01-28 RX ADMIN — TAZOBACTAM SODIUM AND PIPERACILLIN SODIUM 4.5 G: 500; 4 INJECTION, SOLUTION INTRAVENOUS at 06:43

## 2017-01-28 RX ADMIN — Medication 1 CAPSULE: at 08:59

## 2017-01-28 RX ADMIN — INSULIN DETEMIR 10 UNITS: 100 INJECTION, SOLUTION SUBCUTANEOUS at 09:00

## 2017-01-28 RX ADMIN — INSULIN LISPRO 10 UNITS: 100 INJECTION, SOLUTION INTRAVENOUS; SUBCUTANEOUS at 12:35

## 2017-01-28 RX ADMIN — DOCUSATE SODIUM 100 MG: 100 CAPSULE, LIQUID FILLED ORAL at 17:43

## 2017-01-28 RX ADMIN — INSULIN LISPRO 4 UNITS: 100 INJECTION, SOLUTION INTRAVENOUS; SUBCUTANEOUS at 17:44

## 2017-01-29 ENCOUNTER — ANESTHESIA EVENT (OUTPATIENT)
Dept: PERIOP | Facility: HOSPITAL | Age: 47
End: 2017-01-29

## 2017-01-29 LAB
BH CV LOWER ARTERIAL RIGHT ABI RATIO: 1.1
BH CV LOWER ARTERIAL RIGHT DORSALIS PEDIS SYS MAX: 181 MMHG
BH CV LOWER ARTERIAL RIGHT POST TIBIAL SYS MAX: 196 MMHG
GLUCOSE BLDC GLUCOMTR-MCNC: 248 MG/DL (ref 70–130)
GLUCOSE BLDC GLUCOMTR-MCNC: 276 MG/DL (ref 70–130)
GLUCOSE BLDC GLUCOMTR-MCNC: 288 MG/DL (ref 70–130)
GLUCOSE BLDC GLUCOMTR-MCNC: 348 MG/DL (ref 70–130)
UPPER ARTERIAL LEFT ARM BRACHIAL SYS MAX: 169 MMHG
UPPER ARTERIAL RIGHT ARM BRACHIAL SYS MAX: 172 MMHG

## 2017-01-29 PROCEDURE — 25010000002 LORAZEPAM PER 2 MG: Performed by: FAMILY MEDICINE

## 2017-01-29 PROCEDURE — 63710000001 INSULIN DETEMIR PER 5 UNITS: Performed by: INTERNAL MEDICINE

## 2017-01-29 PROCEDURE — 25010000002 PIPERACILLIN SOD-TAZOBACTAM PER 1 G: Performed by: INTERNAL MEDICINE

## 2017-01-29 PROCEDURE — 99233 SBSQ HOSP IP/OBS HIGH 50: CPT | Performed by: INTERNAL MEDICINE

## 2017-01-29 PROCEDURE — 25010000002 HEPARIN (PORCINE) PER 1000 UNITS: Performed by: PHYSICIAN ASSISTANT

## 2017-01-29 PROCEDURE — 63710000001 INSULIN LISPRO (HUMAN) PER 5 UNITS: Performed by: INTERNAL MEDICINE

## 2017-01-29 PROCEDURE — 82962 GLUCOSE BLOOD TEST: CPT

## 2017-01-29 PROCEDURE — 25010000002 VANCOMYCIN PER 500 MG

## 2017-01-29 RX ORDER — LORAZEPAM 2 MG/ML
0.5 INJECTION INTRAMUSCULAR ONCE
Status: COMPLETED | OUTPATIENT
Start: 2017-01-29 | End: 2017-01-29

## 2017-01-29 RX ADMIN — VANCOMYCIN HYDROCHLORIDE 1000 MG: 1 INJECTION, SOLUTION INTRAVENOUS at 00:59

## 2017-01-29 RX ADMIN — GABAPENTIN 1200 MG: 300 CAPSULE ORAL at 09:18

## 2017-01-29 RX ADMIN — TAZOBACTAM SODIUM AND PIPERACILLIN SODIUM 4.5 G: 500; 4 INJECTION, SOLUTION INTRAVENOUS at 23:02

## 2017-01-29 RX ADMIN — TAZOBACTAM SODIUM AND PIPERACILLIN SODIUM 4.5 G: 500; 4 INJECTION, SOLUTION INTRAVENOUS at 06:50

## 2017-01-29 RX ADMIN — HYDROCODONE BITARTRATE AND ACETAMINOPHEN 1 TABLET: 10; 325 TABLET ORAL at 09:18

## 2017-01-29 RX ADMIN — HEPARIN SODIUM 5000 UNITS: 5000 INJECTION, SOLUTION INTRAVENOUS; SUBCUTANEOUS at 09:18

## 2017-01-29 RX ADMIN — Medication 1 CAPSULE: at 09:18

## 2017-01-29 RX ADMIN — INSULIN LISPRO 15 UNITS: 100 INJECTION, SOLUTION INTRAVENOUS; SUBCUTANEOUS at 09:18

## 2017-01-29 RX ADMIN — ATORVASTATIN CALCIUM 40 MG: 40 TABLET, FILM COATED ORAL at 20:50

## 2017-01-29 RX ADMIN — INSULIN LISPRO 15 UNITS: 100 INJECTION, SOLUTION INTRAVENOUS; SUBCUTANEOUS at 17:45

## 2017-01-29 RX ADMIN — HYDROCODONE BITARTRATE AND ACETAMINOPHEN 1 TABLET: 10; 325 TABLET ORAL at 22:56

## 2017-01-29 RX ADMIN — INSULIN LISPRO 6 UNITS: 100 INJECTION, SOLUTION INTRAVENOUS; SUBCUTANEOUS at 12:39

## 2017-01-29 RX ADMIN — FAMOTIDINE 20 MG: 20 TABLET ORAL at 17:43

## 2017-01-29 RX ADMIN — INSULIN LISPRO 15 UNITS: 100 INJECTION, SOLUTION INTRAVENOUS; SUBCUTANEOUS at 12:39

## 2017-01-29 RX ADMIN — INSULIN DETEMIR 35 UNITS: 100 INJECTION, SOLUTION SUBCUTANEOUS at 20:51

## 2017-01-29 RX ADMIN — POVIDONE-IODINE: 1 OINTMENT TOPICAL at 09:20

## 2017-01-29 RX ADMIN — VANCOMYCIN HYDROCHLORIDE 1000 MG: 1 INJECTION, SOLUTION INTRAVENOUS at 12:37

## 2017-01-29 RX ADMIN — LORAZEPAM 0.5 MG: 2 INJECTION INTRAMUSCULAR; INTRAVENOUS at 22:56

## 2017-01-29 RX ADMIN — ASPIRIN 81 MG: 81 TABLET, COATED ORAL at 09:18

## 2017-01-29 RX ADMIN — HEPARIN SODIUM 5000 UNITS: 5000 INJECTION, SOLUTION INTRAVENOUS; SUBCUTANEOUS at 20:50

## 2017-01-29 RX ADMIN — GABAPENTIN 1200 MG: 300 CAPSULE ORAL at 17:43

## 2017-01-29 RX ADMIN — LISINOPRIL 5 MG: 5 TABLET ORAL at 09:18

## 2017-01-29 RX ADMIN — INSULIN LISPRO 6 UNITS: 100 INJECTION, SOLUTION INTRAVENOUS; SUBCUTANEOUS at 17:45

## 2017-01-29 RX ADMIN — GABAPENTIN 1200 MG: 300 CAPSULE ORAL at 20:50

## 2017-01-29 RX ADMIN — INSULIN LISPRO 7 UNITS: 100 INJECTION, SOLUTION INTRAVENOUS; SUBCUTANEOUS at 09:19

## 2017-01-29 RX ADMIN — TAZOBACTAM SODIUM AND PIPERACILLIN SODIUM 4.5 G: 500; 4 INJECTION, SOLUTION INTRAVENOUS at 17:44

## 2017-01-29 RX ADMIN — FAMOTIDINE 20 MG: 20 TABLET ORAL at 09:18

## 2017-01-30 ENCOUNTER — ANESTHESIA (OUTPATIENT)
Dept: PERIOP | Facility: HOSPITAL | Age: 47
End: 2017-01-30

## 2017-01-30 LAB
ALBUMIN SERPL-MCNC: 3.4 G/DL (ref 3.2–4.8)
ANION GAP SERPL CALCULATED.3IONS-SCNC: 9 MMOL/L (ref 3–11)
BACTERIA SPEC AEROBE CULT: NORMAL
BACTERIA SPEC AEROBE CULT: NORMAL
BUN BLD-MCNC: 10 MG/DL (ref 9–23)
BUN/CREAT SERPL: 16.7 (ref 7–25)
CALCIUM SPEC-SCNC: 9.1 MG/DL (ref 8.7–10.4)
CHLORIDE SERPL-SCNC: 92 MMOL/L (ref 99–109)
CO2 SERPL-SCNC: 30 MMOL/L (ref 20–31)
CREAT BLD-MCNC: 0.6 MG/DL (ref 0.6–1.3)
DEPRECATED RDW RBC AUTO: 40.1 FL (ref 37–54)
ERYTHROCYTE [DISTWIDTH] IN BLOOD BY AUTOMATED COUNT: 13 % (ref 11.3–14.5)
GFR SERPL CREATININE-BSD FRML MDRD: 144 ML/MIN/1.73
GLUCOSE BLD-MCNC: 261 MG/DL (ref 70–100)
GLUCOSE BLDC GLUCOMTR-MCNC: 251 MG/DL (ref 70–130)
GLUCOSE BLDC GLUCOMTR-MCNC: 286 MG/DL (ref 70–130)
GLUCOSE BLDC GLUCOMTR-MCNC: 296 MG/DL (ref 70–130)
GLUCOSE BLDC GLUCOMTR-MCNC: 399 MG/DL (ref 70–130)
HCT VFR BLD AUTO: 37.4 % (ref 38.9–50.9)
HGB BLD-MCNC: 12.6 G/DL (ref 13.1–17.5)
INR PPP: 1.06
MCH RBC QN AUTO: 28.8 PG (ref 27–31)
MCHC RBC AUTO-ENTMCNC: 33.7 G/DL (ref 32–36)
MCV RBC AUTO: 85.4 FL (ref 80–99)
PHOSPHATE SERPL-MCNC: 3.2 MG/DL (ref 2.4–5.1)
PLATELET # BLD AUTO: 222 10*3/MM3 (ref 150–450)
PMV BLD AUTO: 11.2 FL (ref 6–12)
POTASSIUM BLD-SCNC: 3.4 MMOL/L (ref 3.5–5.5)
PROTHROMBIN TIME: 11.6 SECONDS (ref 9.6–11.5)
RBC # BLD AUTO: 4.38 10*6/MM3 (ref 4.2–5.76)
SODIUM BLD-SCNC: 131 MMOL/L (ref 132–146)
WBC NRBC COR # BLD: 17.89 10*3/MM3 (ref 3.5–10.8)

## 2017-01-30 PROCEDURE — 80069 RENAL FUNCTION PANEL: CPT | Performed by: INTERNAL MEDICINE

## 2017-01-30 PROCEDURE — 88305 TISSUE EXAM BY PATHOLOGIST: CPT | Performed by: THORACIC SURGERY (CARDIOTHORACIC VASCULAR SURGERY)

## 2017-01-30 PROCEDURE — 0Y6N0ZC DETACHMENT AT LEFT FOOT, PARTIAL 3RD RAY, OPEN APPROACH: ICD-10-PCS | Performed by: THORACIC SURGERY (CARDIOTHORACIC VASCULAR SURGERY)

## 2017-01-30 PROCEDURE — 63710000001 INSULIN DETEMIR PER 5 UNITS: Performed by: INTERNAL MEDICINE

## 2017-01-30 PROCEDURE — 25010000002 CEFUROXIME PER 750 MG: Performed by: NURSE ANESTHETIST, CERTIFIED REGISTERED

## 2017-01-30 PROCEDURE — 82962 GLUCOSE BLOOD TEST: CPT

## 2017-01-30 PROCEDURE — 85027 COMPLETE CBC AUTOMATED: CPT | Performed by: PHYSICIAN ASSISTANT

## 2017-01-30 PROCEDURE — 25010000002 ONDANSETRON PER 1 MG: Performed by: PHYSICIAN ASSISTANT

## 2017-01-30 PROCEDURE — 28810 AMPUTATION TOE & METATARSAL: CPT | Performed by: THORACIC SURGERY (CARDIOTHORACIC VASCULAR SURGERY)

## 2017-01-30 PROCEDURE — 25010000002 HEPARIN (PORCINE) PER 1000 UNITS: Performed by: PHYSICIAN ASSISTANT

## 2017-01-30 PROCEDURE — 0Y6N0ZF DETACHMENT AT LEFT FOOT, PARTIAL 5TH RAY, OPEN APPROACH: ICD-10-PCS | Performed by: THORACIC SURGERY (CARDIOTHORACIC VASCULAR SURGERY)

## 2017-01-30 PROCEDURE — 25010000002 PROPOFOL 10 MG/ML EMULSION: Performed by: NURSE ANESTHETIST, CERTIFIED REGISTERED

## 2017-01-30 PROCEDURE — 25010000002 VANCOMYCIN PER 500 MG

## 2017-01-30 PROCEDURE — 0Y6N0ZD DETACHMENT AT LEFT FOOT, PARTIAL 4TH RAY, OPEN APPROACH: ICD-10-PCS | Performed by: THORACIC SURGERY (CARDIOTHORACIC VASCULAR SURGERY)

## 2017-01-30 PROCEDURE — 25010000002 PIPERACILLIN SOD-TAZOBACTAM PER 1 G: Performed by: INTERNAL MEDICINE

## 2017-01-30 PROCEDURE — 85610 PROTHROMBIN TIME: CPT | Performed by: PHYSICIAN ASSISTANT

## 2017-01-30 PROCEDURE — 99232 SBSQ HOSP IP/OBS MODERATE 35: CPT | Performed by: INTERNAL MEDICINE

## 2017-01-30 RX ORDER — ACETAMINOPHEN 325 MG/1
650 TABLET ORAL EVERY 6 HOURS PRN
Status: DISCONTINUED | OUTPATIENT
Start: 2017-01-30 | End: 2017-02-03 | Stop reason: HOSPADM

## 2017-01-30 RX ORDER — POTASSIUM CHLORIDE 7.45 MG/ML
10 INJECTION INTRAVENOUS
Status: DISCONTINUED | OUTPATIENT
Start: 2017-01-30 | End: 2017-02-03 | Stop reason: HOSPADM

## 2017-01-30 RX ORDER — SODIUM CHLORIDE 0.9 % (FLUSH) 0.9 %
1-10 SYRINGE (ML) INJECTION AS NEEDED
Status: DISCONTINUED | OUTPATIENT
Start: 2017-01-30 | End: 2017-01-30 | Stop reason: HOSPADM

## 2017-01-30 RX ORDER — ONDANSETRON 2 MG/ML
4 INJECTION INTRAMUSCULAR; INTRAVENOUS EVERY 6 HOURS PRN
Status: DISCONTINUED | OUTPATIENT
Start: 2017-01-30 | End: 2017-02-03 | Stop reason: HOSPADM

## 2017-01-30 RX ORDER — OXYCODONE HYDROCHLORIDE AND ACETAMINOPHEN 5; 325 MG/1; MG/1
1 TABLET ORAL ONCE AS NEEDED
Status: DISCONTINUED | OUTPATIENT
Start: 2017-01-30 | End: 2017-01-30 | Stop reason: HOSPADM

## 2017-01-30 RX ORDER — SENNA AND DOCUSATE SODIUM 50; 8.6 MG/1; MG/1
2 TABLET, FILM COATED ORAL 2 TIMES DAILY PRN
Status: DISCONTINUED | OUTPATIENT
Start: 2017-01-30 | End: 2017-02-03 | Stop reason: HOSPADM

## 2017-01-30 RX ORDER — PROMETHAZINE HYDROCHLORIDE 25 MG/ML
6.25 INJECTION, SOLUTION INTRAMUSCULAR; INTRAVENOUS ONCE AS NEEDED
Status: DISCONTINUED | OUTPATIENT
Start: 2017-01-30 | End: 2017-01-30 | Stop reason: HOSPADM

## 2017-01-30 RX ORDER — PROPOFOL 10 MG/ML
VIAL (ML) INTRAVENOUS CONTINUOUS PRN
Status: DISCONTINUED | OUTPATIENT
Start: 2017-01-30 | End: 2017-01-30 | Stop reason: SURG

## 2017-01-30 RX ORDER — LIDOCAINE HYDROCHLORIDE 10 MG/ML
1 INJECTION, SOLUTION EPIDURAL; INFILTRATION; INTRACAUDAL; PERINEURAL ONCE
Status: DISCONTINUED | OUTPATIENT
Start: 2017-01-30 | End: 2017-01-30 | Stop reason: HOSPADM

## 2017-01-30 RX ORDER — BUPIVACAINE HYDROCHLORIDE 2.5 MG/ML
INJECTION, SOLUTION EPIDURAL; INFILTRATION; INTRACAUDAL AS NEEDED
Status: DISCONTINUED | OUTPATIENT
Start: 2017-01-30 | End: 2017-01-30 | Stop reason: SURG

## 2017-01-30 RX ORDER — FENTANYL CITRATE 50 UG/ML
50 INJECTION, SOLUTION INTRAMUSCULAR; INTRAVENOUS
Status: DISCONTINUED | OUTPATIENT
Start: 2017-01-30 | End: 2017-01-30 | Stop reason: HOSPADM

## 2017-01-30 RX ORDER — POTASSIUM CHLORIDE 750 MG/1
40 CAPSULE, EXTENDED RELEASE ORAL AS NEEDED
Status: DISCONTINUED | OUTPATIENT
Start: 2017-01-30 | End: 2017-02-03 | Stop reason: HOSPADM

## 2017-01-30 RX ORDER — POTASSIUM CHLORIDE 1.5 G/1.77G
40 POWDER, FOR SOLUTION ORAL AS NEEDED
Status: DISCONTINUED | OUTPATIENT
Start: 2017-01-30 | End: 2017-02-03 | Stop reason: HOSPADM

## 2017-01-30 RX ORDER — PROPOFOL 10 MG/ML
VIAL (ML) INTRAVENOUS AS NEEDED
Status: DISCONTINUED | OUTPATIENT
Start: 2017-01-30 | End: 2017-01-30 | Stop reason: SURG

## 2017-01-30 RX ORDER — PROMETHAZINE HYDROCHLORIDE 25 MG/1
25 SUPPOSITORY RECTAL ONCE AS NEEDED
Status: DISCONTINUED | OUTPATIENT
Start: 2017-01-30 | End: 2017-01-30 | Stop reason: HOSPADM

## 2017-01-30 RX ORDER — PROMETHAZINE HYDROCHLORIDE 25 MG/1
25 TABLET ORAL ONCE AS NEEDED
Status: DISCONTINUED | OUTPATIENT
Start: 2017-01-30 | End: 2017-01-30 | Stop reason: HOSPADM

## 2017-01-30 RX ORDER — HYDROCODONE BITARTRATE AND ACETAMINOPHEN 7.5; 325 MG/1; MG/1
1 TABLET ORAL EVERY 6 HOURS PRN
Status: DISCONTINUED | OUTPATIENT
Start: 2017-01-30 | End: 2017-01-30

## 2017-01-30 RX ORDER — MAGNESIUM HYDROXIDE 1200 MG/15ML
LIQUID ORAL AS NEEDED
Status: DISCONTINUED | OUTPATIENT
Start: 2017-01-30 | End: 2017-01-30 | Stop reason: HOSPADM

## 2017-01-30 RX ORDER — ONDANSETRON 4 MG/1
4 TABLET, FILM COATED ORAL EVERY 6 HOURS PRN
Status: DISCONTINUED | OUTPATIENT
Start: 2017-01-30 | End: 2017-02-03 | Stop reason: HOSPADM

## 2017-01-30 RX ORDER — SODIUM CHLORIDE, SODIUM LACTATE, POTASSIUM CHLORIDE, CALCIUM CHLORIDE 600; 310; 30; 20 MG/100ML; MG/100ML; MG/100ML; MG/100ML
9 INJECTION, SOLUTION INTRAVENOUS CONTINUOUS
Status: DISCONTINUED | OUTPATIENT
Start: 2017-01-30 | End: 2017-01-30 | Stop reason: SDUPTHER

## 2017-01-30 RX ORDER — HEPARIN SODIUM 5000 [USP'U]/ML
5000 INJECTION, SOLUTION INTRAVENOUS; SUBCUTANEOUS EVERY 12 HOURS SCHEDULED
Status: DISCONTINUED | OUTPATIENT
Start: 2017-01-31 | End: 2017-02-03 | Stop reason: HOSPADM

## 2017-01-30 RX ORDER — SODIUM CHLORIDE 450 MG/100ML
35 INJECTION, SOLUTION INTRAVENOUS CONTINUOUS
Status: DISCONTINUED | OUTPATIENT
Start: 2017-01-30 | End: 2017-01-31

## 2017-01-30 RX ORDER — MORPHINE SULFATE 2 MG/ML
2 INJECTION, SOLUTION INTRAMUSCULAR; INTRAVENOUS
Status: DISCONTINUED | OUTPATIENT
Start: 2017-01-30 | End: 2017-02-03 | Stop reason: HOSPADM

## 2017-01-30 RX ADMIN — GABAPENTIN 1200 MG: 300 CAPSULE ORAL at 21:08

## 2017-01-30 RX ADMIN — BUPIVACAINE HYDROCHLORIDE 30 ML: 2.5 INJECTION, SOLUTION EPIDURAL; INFILTRATION; INTRACAUDAL; PERINEURAL at 13:58

## 2017-01-30 RX ADMIN — ATORVASTATIN CALCIUM 40 MG: 40 TABLET, FILM COATED ORAL at 21:08

## 2017-01-30 RX ADMIN — VANCOMYCIN HYDROCHLORIDE 1000 MG: 1 INJECTION, SOLUTION INTRAVENOUS at 01:09

## 2017-01-30 RX ADMIN — PROPOFOL 50 MG: 10 INJECTION, EMULSION INTRAVENOUS at 16:03

## 2017-01-30 RX ADMIN — INSULIN LISPRO 15 UNITS: 100 INJECTION, SOLUTION INTRAVENOUS; SUBCUTANEOUS at 17:57

## 2017-01-30 RX ADMIN — FAMOTIDINE 20 MG: 20 TABLET ORAL at 09:16

## 2017-01-30 RX ADMIN — SODIUM CHLORIDE 35 ML/HR: 4.5 INJECTION, SOLUTION INTRAVENOUS at 17:58

## 2017-01-30 RX ADMIN — HEPARIN SODIUM 5000 UNITS: 5000 INJECTION, SOLUTION INTRAVENOUS; SUBCUTANEOUS at 09:17

## 2017-01-30 RX ADMIN — INSULIN LISPRO 6 UNITS: 100 INJECTION, SOLUTION INTRAVENOUS; SUBCUTANEOUS at 17:57

## 2017-01-30 RX ADMIN — ASPIRIN 81 MG: 81 TABLET, COATED ORAL at 09:16

## 2017-01-30 RX ADMIN — Medication 1 CAPSULE: at 09:17

## 2017-01-30 RX ADMIN — ONDANSETRON 4 MG: 2 INJECTION INTRAMUSCULAR; INTRAVENOUS at 17:01

## 2017-01-30 RX ADMIN — FAMOTIDINE 20 MG: 20 TABLET ORAL at 17:55

## 2017-01-30 RX ADMIN — TAZOBACTAM SODIUM AND PIPERACILLIN SODIUM 4.5 G: 500; 4 INJECTION, SOLUTION INTRAVENOUS at 05:03

## 2017-01-30 RX ADMIN — GABAPENTIN 1200 MG: 300 CAPSULE ORAL at 17:54

## 2017-01-30 RX ADMIN — LISINOPRIL 5 MG: 5 TABLET ORAL at 09:17

## 2017-01-30 RX ADMIN — TAZOBACTAM SODIUM AND PIPERACILLIN SODIUM 4.5 G: 500; 4 INJECTION, SOLUTION INTRAVENOUS at 21:09

## 2017-01-30 RX ADMIN — SODIUM CHLORIDE, POTASSIUM CHLORIDE, SODIUM LACTATE AND CALCIUM CHLORIDE: 600; 310; 30; 20 INJECTION, SOLUTION INTRAVENOUS at 16:02

## 2017-01-30 RX ADMIN — CEFUROXIME 1.5 G: 1.5 INJECTION, POWDER, FOR SOLUTION INTRAVENOUS at 16:02

## 2017-01-30 RX ADMIN — PROPOFOL 50 MCG/KG/MIN: 10 INJECTION, EMULSION INTRAVENOUS at 16:03

## 2017-01-30 RX ADMIN — GABAPENTIN 1200 MG: 300 CAPSULE ORAL at 09:16

## 2017-01-30 RX ADMIN — INSULIN DETEMIR 42 UNITS: 100 INJECTION, SOLUTION SUBCUTANEOUS at 21:09

## 2017-01-30 NOTE — ANESTHESIA PREPROCEDURE EVALUATION
Anesthesia Evaluation      Airway   Mallampati: I  TM distance: <3 FB  Neck ROM: full  no difficulty expected  Dental - normal exam     Pulmonary - normal exam   Cardiovascular - normal exam  (+) hypertension,     Neuro/Psych  (+) numbness,    GI/Hepatic/Renal/Endo    (+)  hepatitis, liver disease, diabetes mellitus,     Musculoskeletal     Abdominal  - normal exam    Bowel sounds: normal.   Substance History      OB/GYN          Other   (+) arthritis                        Anesthesia Plan    ASA 3     general and regional

## 2017-01-30 NOTE — ANESTHESIA POSTPROCEDURE EVALUATION
Patient: Kendrick Crystal    Procedure Summary     Date Anesthesia Start Anesthesia Stop Room / Location    01/30/17 1602 [id number]  [facility] / [facility]       Procedure Diagnosis Surgeon Provider    left fourth and fifth transmetatarsal toe amputation  (Left Toes) Cellulitis of left foot  (Cellulitis of left foot [L03.116]) MD Kendrick Augustine MD          Anesthesia Type: general, regional  Last vitals  BP      Temp      Pulse     Resp      SpO2        Post Anesthesia Care and Evaluation    Patient location during evaluation: PACU  Patient participation: complete - patient participated  Level of consciousness: awake and alert  Pain score: 0  Pain management: adequate  Airway patency: patent  Anesthetic complications: No anesthetic complications  PONV Status: none  Cardiovascular status: hemodynamically stable and acceptable  Respiratory status: nonlabored ventilation, acceptable and nasal cannula  Hydration status: acceptable

## 2017-01-30 NOTE — ANESTHESIA PROCEDURE NOTES
Peripheral Block    Patient location during procedure: pre-op  Start time: 1/30/2017 1:58 PM  Stop time: 1/30/2017 2:12 PM  Reason for block: at surgeon's request and post-op pain management  Performed by  Anesthesiologist: JOEL FATIMA  Preanesthetic Checklist  Completed: patient identified, site marked, surgical consent, pre-op evaluation, timeout performed, IV checked, risks and benefits discussed and monitors and equipment checked  Peripheral Block Prep:  Sterile barriers:cap, gloves, mask and sterile barriers  Prep: ChloraPrep  Patient monitoring: blood pressure monitoring, continuous pulse oximetry and EKG  Peripheral Procedure  Sedation:yes (versed 2mg; fentanyl 100 mcg)  Guidance:ultrasound guided  Images:still images obtained  Block Type:popliteal  Injection Technique:single-shotNeedle Type:echogenic  Needle Gauge:20 G  ULTRASOUND INTERPRETATION. Using ultrasound guidance a gauge needle was placed in close proximity to the sciatic nerve, at which point, under ultrasound guidance anesthetic was injected in the area of the nerve and spread of the anesthesia was seen on ultrasound in close proximity thereto.  There were no abnormalities seen on ultrasound; a digital image was taken; and the patient tolerated the procedure with no complications.   Medications  Local Injected:bupivacaine 0.25% without epinephrine Local Amount Injected:30 (mls)mL  Post Assessment  Patient Tolerance:comfortable throughout block  Complications:no  Additional Notes  Procedure:                                                              Analgesia was achieved with IV Sedation( see meds)      The pt was placed in  lateral position.  The Insertion site was  prepped and Draped in sterile fashion.  The pt was anesthetized with  IV Sedation( see meds).  Skin and cutaneous tissue was infiltrated and anesthetized with 1% Lidocaine via a 25g needle.  A BBraun 4 inch 18g echogenic needle was then  inserted approximately 3 cm proximal to  the popliteal keiko a at the lateral mid biceps femoris and advanced In-plane with Ultrasound guidance.  Normal Saline PSF was utilized for hydrodissection of tissue.  The popliteal artery was visualized and the common peroneal and tibial bifurcation was located.  LA injection spread was visualized, injection was incremental 1-5ml, injection pressure was normal or little, no intraneural injection, no vascular injection.  .

## 2017-01-31 LAB
ANION GAP SERPL CALCULATED.3IONS-SCNC: 8 MMOL/L (ref 3–11)
BASOPHILS # BLD AUTO: 0.01 10*3/MM3 (ref 0–0.2)
BASOPHILS NFR BLD AUTO: 0.1 % (ref 0–1)
BUN BLD-MCNC: 11 MG/DL (ref 9–23)
BUN/CREAT SERPL: 18.3 (ref 7–25)
CALCIUM SPEC-SCNC: 9 MG/DL (ref 8.7–10.4)
CHLORIDE SERPL-SCNC: 95 MMOL/L (ref 99–109)
CO2 SERPL-SCNC: 28 MMOL/L (ref 20–31)
CREAT BLD-MCNC: 0.6 MG/DL (ref 0.6–1.3)
DEPRECATED RDW RBC AUTO: 40.4 FL (ref 37–54)
EOSINOPHIL # BLD AUTO: 0 10*3/MM3 (ref 0.1–0.3)
EOSINOPHIL NFR BLD AUTO: 0 % (ref 0–3)
ERYTHROCYTE [DISTWIDTH] IN BLOOD BY AUTOMATED COUNT: 13 % (ref 11.3–14.5)
GFR SERPL CREATININE-BSD FRML MDRD: 144 ML/MIN/1.73
GLUCOSE BLD-MCNC: 560 MG/DL (ref 70–100)
GLUCOSE BLDC GLUCOMTR-MCNC: 126 MG/DL (ref 70–130)
GLUCOSE BLDC GLUCOMTR-MCNC: 212 MG/DL (ref 70–130)
GLUCOSE BLDC GLUCOMTR-MCNC: 418 MG/DL (ref 70–130)
GLUCOSE BLDC GLUCOMTR-MCNC: 458 MG/DL (ref 70–130)
GLUCOSE BLDC GLUCOMTR-MCNC: 557 MG/DL (ref 70–130)
HCT VFR BLD AUTO: 34.6 % (ref 38.9–50.9)
HGB BLD-MCNC: 11.6 G/DL (ref 13.1–17.5)
IMM GRANULOCYTES # BLD: 0.1 10*3/MM3 (ref 0–0.03)
IMM GRANULOCYTES NFR BLD: 0.8 % (ref 0–0.6)
LYMPHOCYTES # BLD AUTO: 0.65 10*3/MM3 (ref 0.6–4.8)
LYMPHOCYTES NFR BLD AUTO: 5.3 % (ref 24–44)
MCH RBC QN AUTO: 28.6 PG (ref 27–31)
MCHC RBC AUTO-ENTMCNC: 33.5 G/DL (ref 32–36)
MCV RBC AUTO: 85.2 FL (ref 80–99)
MONOCYTES # BLD AUTO: 0.32 10*3/MM3 (ref 0–1)
MONOCYTES NFR BLD AUTO: 2.6 % (ref 0–12)
NEUTROPHILS # BLD AUTO: 11.16 10*3/MM3 (ref 1.5–8.3)
NEUTROPHILS NFR BLD AUTO: 91.2 % (ref 41–71)
PLATELET # BLD AUTO: 220 10*3/MM3 (ref 150–450)
PMV BLD AUTO: 10.9 FL (ref 6–12)
POTASSIUM BLD-SCNC: 3.8 MMOL/L (ref 3.5–5.5)
RBC # BLD AUTO: 4.06 10*6/MM3 (ref 4.2–5.76)
SODIUM BLD-SCNC: 131 MMOL/L (ref 132–146)
VANCOMYCIN TROUGH SERPL-MCNC: 5.5 MCG/ML (ref 10–20)
WBC NRBC COR # BLD: 12.24 10*3/MM3 (ref 3.5–10.8)

## 2017-01-31 PROCEDURE — 25010000002 CEFUROXIME PER 750 MG: Performed by: PHYSICIAN ASSISTANT

## 2017-01-31 PROCEDURE — 99232 SBSQ HOSP IP/OBS MODERATE 35: CPT | Performed by: INTERNAL MEDICINE

## 2017-01-31 PROCEDURE — 82962 GLUCOSE BLOOD TEST: CPT

## 2017-01-31 PROCEDURE — 25010000002 HEPARIN (PORCINE) PER 1000 UNITS: Performed by: PHYSICIAN ASSISTANT

## 2017-01-31 PROCEDURE — 85025 COMPLETE CBC W/AUTO DIFF WBC: CPT | Performed by: PHYSICIAN ASSISTANT

## 2017-01-31 PROCEDURE — 63710000001 INSULIN LISPRO (HUMAN) PER 5 UNITS: Performed by: NURSE PRACTITIONER

## 2017-01-31 PROCEDURE — 63710000001 INSULIN DETEMIR PER 5 UNITS: Performed by: INTERNAL MEDICINE

## 2017-01-31 PROCEDURE — 80202 ASSAY OF VANCOMYCIN: CPT

## 2017-01-31 PROCEDURE — 97163 PT EVAL HIGH COMPLEX 45 MIN: CPT

## 2017-01-31 PROCEDURE — 97110 THERAPEUTIC EXERCISES: CPT

## 2017-01-31 PROCEDURE — 25010000002 PIPERACILLIN SOD-TAZOBACTAM PER 1 G: Performed by: INTERNAL MEDICINE

## 2017-01-31 PROCEDURE — 25010000002 VANCOMYCIN PER 500 MG

## 2017-01-31 PROCEDURE — 25010000002 VANCOMYCIN HCL IN NACL 1.25-0.9 GM/250ML-% SOLUTION

## 2017-01-31 PROCEDURE — 80048 BASIC METABOLIC PNL TOTAL CA: CPT | Performed by: PHYSICIAN ASSISTANT

## 2017-01-31 PROCEDURE — 97606 NEG PRS WND THER DME>50 SQCM: CPT

## 2017-01-31 RX ORDER — VANCOMYCIN HYDROCHLORIDE
1250 EVERY 12 HOURS
Status: DISCONTINUED | OUTPATIENT
Start: 2017-01-31 | End: 2017-02-02

## 2017-01-31 RX ADMIN — ATORVASTATIN CALCIUM 40 MG: 40 TABLET, FILM COATED ORAL at 21:31

## 2017-01-31 RX ADMIN — ASPIRIN 81 MG: 81 TABLET, COATED ORAL at 08:32

## 2017-01-31 RX ADMIN — GABAPENTIN 1200 MG: 300 CAPSULE ORAL at 21:31

## 2017-01-31 RX ADMIN — INSULIN LISPRO 9 UNITS: 100 INJECTION, SOLUTION INTRAVENOUS; SUBCUTANEOUS at 08:33

## 2017-01-31 RX ADMIN — LISINOPRIL 5 MG: 5 TABLET ORAL at 08:31

## 2017-01-31 RX ADMIN — HEPARIN SODIUM 5000 UNITS: 5000 INJECTION, SOLUTION INTRAVENOUS; SUBCUTANEOUS at 21:31

## 2017-01-31 RX ADMIN — TAZOBACTAM SODIUM AND PIPERACILLIN SODIUM 4.5 G: 500; 4 INJECTION, SOLUTION INTRAVENOUS at 21:31

## 2017-01-31 RX ADMIN — INSULIN LISPRO 4 UNITS: 100 INJECTION, SOLUTION INTRAVENOUS; SUBCUTANEOUS at 13:12

## 2017-01-31 RX ADMIN — HYDROCODONE BITARTRATE AND ACETAMINOPHEN 1 TABLET: 10; 325 TABLET ORAL at 08:44

## 2017-01-31 RX ADMIN — GABAPENTIN 1200 MG: 300 CAPSULE ORAL at 08:32

## 2017-01-31 RX ADMIN — VANCOMYCIN HYDROCHLORIDE 1000 MG: 1 INJECTION, SOLUTION INTRAVENOUS at 00:29

## 2017-01-31 RX ADMIN — INSULIN DETEMIR 60 UNITS: 100 INJECTION, SOLUTION SUBCUTANEOUS at 08:50

## 2017-01-31 RX ADMIN — VANCOMYCIN HYDROCHLORIDE 1000 MG: 1 INJECTION, SOLUTION INTRAVENOUS at 13:11

## 2017-01-31 RX ADMIN — CEFUROXIME 1.5 G: 1.5 INJECTION, SOLUTION INTRAVENOUS at 08:32

## 2017-01-31 RX ADMIN — VANCOMYCIN HYDROCHLORIDE 1250 MG: 1 INJECTION, POWDER, LYOPHILIZED, FOR SOLUTION INTRAVENOUS at 21:31

## 2017-01-31 RX ADMIN — FAMOTIDINE 20 MG: 20 TABLET ORAL at 08:32

## 2017-01-31 RX ADMIN — TAZOBACTAM SODIUM AND PIPERACILLIN SODIUM 4.5 G: 500; 4 INJECTION, SOLUTION INTRAVENOUS at 13:11

## 2017-01-31 RX ADMIN — GABAPENTIN 1200 MG: 300 CAPSULE ORAL at 17:18

## 2017-01-31 RX ADMIN — HEPARIN SODIUM 5000 UNITS: 5000 INJECTION, SOLUTION INTRAVENOUS; SUBCUTANEOUS at 08:33

## 2017-01-31 RX ADMIN — DOCUSATE SODIUM 100 MG: 100 CAPSULE, LIQUID FILLED ORAL at 08:32

## 2017-01-31 RX ADMIN — FAMOTIDINE 20 MG: 20 TABLET ORAL at 17:19

## 2017-01-31 RX ADMIN — NICARDIPINE HYDROCHLORIDE 5 MG/HR: 25 INJECTION INTRAVENOUS at 06:10

## 2017-01-31 RX ADMIN — INSULIN LISPRO 3 UNITS: 100 INJECTION, SOLUTION INTRAVENOUS; SUBCUTANEOUS at 06:10

## 2017-01-31 RX ADMIN — TAZOBACTAM SODIUM AND PIPERACILLIN SODIUM 4.5 G: 500; 4 INJECTION, SOLUTION INTRAVENOUS at 06:10

## 2017-01-31 RX ADMIN — Medication 1 CAPSULE: at 08:32

## 2017-02-01 LAB
CYTO UR: NORMAL
GLUCOSE BLDC GLUCOMTR-MCNC: 118 MG/DL (ref 70–130)
GLUCOSE BLDC GLUCOMTR-MCNC: 130 MG/DL (ref 70–130)
GLUCOSE BLDC GLUCOMTR-MCNC: 211 MG/DL (ref 70–130)
GLUCOSE BLDC GLUCOMTR-MCNC: 390 MG/DL (ref 70–130)
GLUCOSE BLDC GLUCOMTR-MCNC: 433 MG/DL (ref 70–130)
GLUCOSE BLDC GLUCOMTR-MCNC: 481 MG/DL (ref 70–130)
LAB AP CASE REPORT: NORMAL
LAB AP CLINICAL INFORMATION: NORMAL
Lab: NORMAL
PATH REPORT.FINAL DX SPEC: NORMAL
PATH REPORT.GROSS SPEC: NORMAL

## 2017-02-01 PROCEDURE — 25010000002 HEPARIN (PORCINE) PER 1000 UNITS: Performed by: PHYSICIAN ASSISTANT

## 2017-02-01 PROCEDURE — 82962 GLUCOSE BLOOD TEST: CPT

## 2017-02-01 PROCEDURE — 25010000002 PIPERACILLIN SOD-TAZOBACTAM PER 1 G: Performed by: INTERNAL MEDICINE

## 2017-02-01 PROCEDURE — 25010000002 VANCOMYCIN HCL IN NACL 1.25-0.9 GM/250ML-% SOLUTION

## 2017-02-01 PROCEDURE — 63710000001 INSULIN DETEMIR PER 5 UNITS: Performed by: INTERNAL MEDICINE

## 2017-02-01 PROCEDURE — 99232 SBSQ HOSP IP/OBS MODERATE 35: CPT | Performed by: INTERNAL MEDICINE

## 2017-02-01 RX ORDER — LISINOPRIL 10 MG/1
10 TABLET ORAL
Status: DISCONTINUED | OUTPATIENT
Start: 2017-02-02 | End: 2017-02-02

## 2017-02-01 RX ADMIN — GABAPENTIN 1200 MG: 300 CAPSULE ORAL at 20:35

## 2017-02-01 RX ADMIN — GABAPENTIN 1200 MG: 300 CAPSULE ORAL at 15:01

## 2017-02-01 RX ADMIN — HEPARIN SODIUM 5000 UNITS: 5000 INJECTION, SOLUTION INTRAVENOUS; SUBCUTANEOUS at 08:57

## 2017-02-01 RX ADMIN — ATORVASTATIN CALCIUM 40 MG: 40 TABLET, FILM COATED ORAL at 20:35

## 2017-02-01 RX ADMIN — Medication 1 CAPSULE: at 08:57

## 2017-02-01 RX ADMIN — TAZOBACTAM SODIUM AND PIPERACILLIN SODIUM 4.5 G: 500; 4 INJECTION, SOLUTION INTRAVENOUS at 20:35

## 2017-02-01 RX ADMIN — FAMOTIDINE 20 MG: 20 TABLET ORAL at 08:57

## 2017-02-01 RX ADMIN — HEPARIN SODIUM 5000 UNITS: 5000 INJECTION, SOLUTION INTRAVENOUS; SUBCUTANEOUS at 20:34

## 2017-02-01 RX ADMIN — TAZOBACTAM SODIUM AND PIPERACILLIN SODIUM 4.5 G: 500; 4 INJECTION, SOLUTION INTRAVENOUS at 15:01

## 2017-02-01 RX ADMIN — VANCOMYCIN HYDROCHLORIDE 1250 MG: 1 INJECTION, POWDER, LYOPHILIZED, FOR SOLUTION INTRAVENOUS at 20:35

## 2017-02-01 RX ADMIN — VANCOMYCIN HYDROCHLORIDE 1250 MG: 1 INJECTION, POWDER, LYOPHILIZED, FOR SOLUTION INTRAVENOUS at 23:27

## 2017-02-01 RX ADMIN — Medication 5 MG: at 23:25

## 2017-02-01 RX ADMIN — POVIDONE-IODINE: 1 OINTMENT TOPICAL at 09:25

## 2017-02-01 RX ADMIN — VANCOMYCIN HYDROCHLORIDE 1250 MG: 1 INJECTION, POWDER, LYOPHILIZED, FOR SOLUTION INTRAVENOUS at 11:56

## 2017-02-01 RX ADMIN — FAMOTIDINE 20 MG: 20 TABLET ORAL at 17:14

## 2017-02-01 RX ADMIN — GABAPENTIN 1200 MG: 300 CAPSULE ORAL at 08:57

## 2017-02-01 RX ADMIN — INSULIN DETEMIR 60 UNITS: 100 INJECTION, SOLUTION SUBCUTANEOUS at 06:34

## 2017-02-01 RX ADMIN — INSULIN LISPRO 9 UNITS: 100 INJECTION, SOLUTION INTRAVENOUS; SUBCUTANEOUS at 07:56

## 2017-02-01 RX ADMIN — HYDROCODONE BITARTRATE AND ACETAMINOPHEN 1 TABLET: 10; 325 TABLET ORAL at 10:30

## 2017-02-01 RX ADMIN — ASPIRIN 81 MG: 81 TABLET, COATED ORAL at 08:57

## 2017-02-01 RX ADMIN — TAZOBACTAM SODIUM AND PIPERACILLIN SODIUM 4.5 G: 500; 4 INJECTION, SOLUTION INTRAVENOUS at 06:27

## 2017-02-01 RX ADMIN — HYDROCODONE BITARTRATE AND ACETAMINOPHEN 1 TABLET: 10; 325 TABLET ORAL at 17:38

## 2017-02-01 RX ADMIN — INSULIN LISPRO 4 UNITS: 100 INJECTION, SOLUTION INTRAVENOUS; SUBCUTANEOUS at 11:57

## 2017-02-01 RX ADMIN — TAZOBACTAM SODIUM AND PIPERACILLIN SODIUM 4.5 G: 500; 4 INJECTION, SOLUTION INTRAVENOUS at 23:26

## 2017-02-01 RX ADMIN — LISINOPRIL 5 MG: 5 TABLET ORAL at 08:57

## 2017-02-01 NOTE — PAYOR COMM NOTE
"Elliot Crystal (46 y.o. Male)     UPDATED CLINICALS      Date of Birth Social Security Number Address Home Phone MRN    1970  [address] [place] [id number] [phone] [id number]    Taoist Marital Status          Pentecostal        Admission Date Admission Type Admitting Provider Attending Provider Department, Room/Bed    1/25/17 Emergency Gilbert Smith MD [place], Gilbert Bonilla MD [address], [address]461/1    Discharge Date Discharge Disposition Discharge Destination                      Attending Provider: Gilbert Smith MD     Allergies:  No Known Drug Allergy    Isolation:  None   Infection:  None   Code Status:  FULL    Ht:  75\" (190.5 cm)   Wt:  248 lb 9.6 oz (113 kg)    Admission Cmt:  None   Principal Problem:  Cellulitis of left foot [L03.116]                 Active Insurance as of 1/25/2017     Primary Coverage     Payor Plan Insurance Group Employer/Plan Group    HUMANA MEDICAID HU[employer]NA CARESOURCE [id number]     Payor Plan Address Payor Plan Phone Number Effective From Effective To    PO  [phone] 1/18/2017     [place] [address]       Subscriber Name Subscriber Birth Date Member ID       ELLIOT CRYSTAL 1970 [id number]                 Emergency Contacts      (Rel.) Home Phone Work Phone Mobile Phone    Cindy Crystal (Spouse) [phone] -- [phone]            Insurance Information                HUMANA MEDICAID/HUMANA [employer]ESOURCE Phone: [phone]    Subscriber: BonillaElliot Subscriber#: [id number]    Group#: [id number] Precert#:           Hospital Medications (active)       Dose Frequency Start End    acetaminophen (TYLENOL) tablet 650 mg 650 mg Every 6 Hours PRN 1/30/2017     Sig - Route: Take 2 tablets by mouth Every 6 (Six) Hours As Needed for fever (Temperature greater than 101F). - Oral    aspirin EC tablet 81 mg 81 mg Daily 1/28/2017     Sig - Route: Take 1 tablet by mouth Daily. - Oral    atorvastatin (LIPITOR) tablet 40 " mg 40 mg Nightly 1/28/2017     Sig - Route: Take 1 tablet by mouth Every Night. - Oral    benzonatate (TESSALON) capsule 100 mg 100 mg 3 Times Daily PRN 1/27/2017     Sig - Route: Take 1 capsule by mouth 3 (Three) Times a Day As Needed for cough. - Oral    dextrose (D50W) solution 25 g 25 g Every 15 Minutes PRN 1/25/2017     Sig - Route: Infuse 50 mL into a venous catheter Every 15 (Fifteen) Minutes As Needed for low blood sugar (Blood Sugar Less Than 70, Patient Has IV Access - Unresponsive, NPO or Unable To Safely Swallow). - Intravenous    dextrose (GLUTOSE) oral gel 15 g 15 g Every 15 Minutes PRN 1/25/2017     Sig - Route: Take 15 g by mouth Every 15 (Fifteen) Minutes As Needed for low blood sugar (Blood Sugar Less Than 70, Patient Alert, Is Not NPO & Can Safely Swallow). - Oral    docusate sodium (COLACE) capsule 100 mg 100 mg 2 Times Daily 1/25/2017     Sig - Route: Take 1 capsule by mouth 2 (Two) Times a Day. - Oral    famotidine (PEPCID) tablet 20 mg 20 mg 2 Times Daily 1/25/2017     Sig - Route: Take 1 tablet by mouth 2 (Two) Times a Day. - Oral    gabapentin (NEURONTIN) capsule 1,200 mg 1,200 mg 3 Times Daily 1/25/2017     Sig - Route: Take 4 capsules by mouth 3 (Three) Times a Day. - Oral    glucagon (GLUCAGEN) injection 1 mg 1 mg Every 15 Minutes PRN 1/25/2017     Sig - Route: Inject 1 mg under the skin Every 15 (Fifteen) Minutes As Needed (Blood Glucose Less Than 70 - Patient Without IV Access - Unresponsive, NPO or Unable To Safely Swallow). - Subcutaneous    heparin (porcine) 5000 UNIT/ML injection 5,000 Units 5,000 Units Every 12 Hours Scheduled 1/31/2017     Sig - Route: Inject 1 mL under the skin Every 12 (Twelve) Hours. - Subcutaneous    HYDROcodone-acetaminophen (NORCO)  MG per tablet 1 tablet 1 tablet Every 6 Hours PRN 1/25/2017 2/4/2017    Sig - Route: Take 1 tablet by mouth Every 6 (Six) Hours As Needed for moderate pain (4-6). - Oral    insulin detemir (LEVEMIR) injection 60 Units 60  "Units Every Morning 1/31/2017     Sig - Route: Inject 60 Units under the skin Every Morning. - Subcutaneous    insulin lispro (humaLOG) injection 0-9 Units 0-9 Units 3 Times Daily With Meals 1/26/2017     Sig - Route: Inject 0-9 Units under the skin 3 (Three) Times a Day With Meals. - Subcutaneous    insulin lispro (humaLOG) injection 2-7 Units 2-7 Units Nightly PRN 1/26/2017     Sig - Route: Inject 2-7 Units under the skin At Night As Needed for high blood sugar. - Subcutaneous    insulin lispro (humaLOG) injection 20 Units 20 Units 3 Times Daily With Meals 1/31/2017     Sig - Route: Inject 20 Units under the skin 3 (Three) Times a Day With Meals. - Subcutaneous    insulin lispro (humaLOG) injection 3 Units 3 Units Once 1/31/2017 1/31/2017    Sig - Route: Inject 3 Units under the skin 1 (One) Time. - Subcutaneous    lisinopril (PRINIVIL,ZESTRIL) tablet 5 mg 5 mg Every 24 Hours Scheduled 1/28/2017     Sig - Route: Take 1 tablet by mouth Daily. - Oral    magnesium hydroxide (MILK OF MAGNESIA) suspension 2400 mg/10mL 10 mL 10 mL Daily PRN 1/30/2017     Sig - Route: Take 10 mL by mouth Daily As Needed for constipation. - Oral    Morphine sulfate (PF) injection 2 mg 2 mg Every 1 Hour PRN 1/30/2017 2/9/2017    Sig - Route: Infuse 1 mL into a venous catheter Every 1 (One) Hour As Needed for severe pain (7-10). - Intravenous    ondansetron (ZOFRAN) injection 4 mg 4 mg Every 6 Hours PRN 1/30/2017     Sig - Route: Infuse 2 mL into a venous catheter Every 6 (Six) Hours As Needed for nausea or vomiting. - Intravenous    Linked Group 1:  \"Or\" Linked Group Details        ondansetron (ZOFRAN) tablet 4 mg 4 mg Every 6 Hours PRN 1/30/2017     Sig - Route: Take 1 tablet by mouth Every 6 (Six) Hours As Needed for nausea or vomiting. - Oral    Linked Group 1:  \"Or\" Linked Group Details        Pharmacy to dose vancomycin  Continuous PRN 1/25/2017     Sig - Route: Continuous As Needed for consult. - Does not apply    " "piperacillin-tazobactam (ZOSYN) 4.5 g in dextrose 100 mL IVPB (premix) 4.5 g Every 8 Hours 1/26/2017     Sig - Route: Infuse 100 mL into a venous catheter Every 8 (Eight) Hours. - Intravenous    potassium chloride (KLOR-CON) packet 40 mEq 40 mEq As Needed 1/30/2017     Sig - Route: Take 40 mEq by mouth As Needed (potassium replacement, see admin instructions). - Oral    Linked Group 2:  \"Or\" Linked Group Details        potassium chloride (MICRO-K) CR capsule 40 mEq 40 mEq As Needed 1/30/2017     Sig - Route: Take 4 capsules by mouth As Needed (potassium replacement.  see admin instructions). - Oral    Linked Group 2:  \"Or\" Linked Group Details        potassium chloride 10 mEq in 100 mL IVPB 10 mEq Every 1 Hour PRN 1/30/2017     Sig - Route: Infuse 100 mL into a venous catheter Every 1 (One) Hour As Needed (potassium protocol PERIPHERAL - see admin instructions). - Intravenous    Linked Group 2:  \"Or\" Linked Group Details        povidone-iodine (BETADINE) ointment  Daily 1/26/2017     Sig - Route: Apply  topically Daily. - Topical    probiotic (CULTURELLE) capsule 1 capsule 1 capsule Daily 1/26/2017     Sig - Route: Take 1 capsule by mouth Daily. - Oral    sennosides-docusate sodium (SENOKOT-S) 8.6-50 MG tablet 2 tablet 2 tablet 2 Times Daily PRN 1/30/2017     Sig - Route: Take 2 tablets by mouth 2 (Two) Times a Day As Needed for constipation. - Oral    sodium chloride 0.9 % flush 1-10 mL 1-10 mL As Needed 1/25/2017     Sig - Route: Infuse 1-10 mL into a venous catheter As Needed for line care. - Intravenous    vancomycin IVPB 1250 mg in 250 mL NS (premix) 1,250 mg Every 12 Hours 1/31/2017     Sig - Route: Infuse 250 mL into a venous catheter Every 12 (Twelve) Hours. - Intravenous    cefuroxime (ZINACEF) IVPB 1.5 g (Discontinued) 1.5 g Every 8 Hours 1/31/2017 1/31/2017    Sig - Route: Infuse 50 mL into a venous catheter Every 8 (Eight) Hours. - Intravenous    insulin detemir (LEVEMIR) injection 42 Units " (Discontinued) 42 Units Nightly 1/30/2017 1/31/2017    Sig - Route: Inject 42 Units under the skin Every Night. - Subcutaneous    insulin lispro (humaLOG) injection 15 Units (Discontinued) 15 Units 3 Times Daily With Meals 1/29/2017 1/31/2017    Sig - Route: Inject 15 Units under the skin 3 (Three) Times a Day With Meals. - Subcutaneous    niCARdipine (CARDENE) 25 mg/250 mL 0.9% NS (Discontinued) 5-15 mg/hr Titrated 1/31/2017 1/31/2017    Sig - Route: Infuse 5-15 mg/hr into a venous catheter Dose Adjusted By Provider As Needed. - Intravenous    sodium chloride 0.45 % infusion (Discontinued) 35 mL/hr Continuous 1/30/2017 1/31/2017    Sig - Route: Infuse 35 mL/hr into a venous catheter Continuous. - Intravenous    vancomycin (VANCOCIN) IVPB 1 g (premix) in Dextrose 5% 200 mL (Discontinued) 1,000 mg Every 12 Hours 1/26/2017 1/31/2017    Sig - Route: Infuse 200 mL into a venous catheter Every 12 (Twelve) Hours. - Intravenous          Lab Results (last 72 hours)     Procedure Component Value Units Date/Time    POC Glucose Fingerstick [[id number]]  (Abnormal) Collected:  01/29/17 0814    Specimen:  Blood Updated:  01/29/17 0827     Glucose 348 (H) mg/dL     Narrative:       Meter: [id number] : [id number] [employer]    POC Glucose Fingerstick [[id number]]  (Abnormal) Collected:  01/29/17 1226    Specimen:  Blood Updated:  01/29/17 1236     Glucose 288 (H) mg/dL     Narrative:       Meter: [id number] : [id number] Myreksia    POC Glucose Fingerstick [[id number]]  (Abnormal) Collected:  01/29/17 1719    Specimen:  Blood Updated:  01/29/17 1735     Glucose 276 (H) mg/dL     Narrative:       Meter: [id number] : [id number] [employer]    POC Glucose Fingerstick [[id number]]  (Abnormal) Collected:  01/29/17 2030    Specimen:  Blood Updated:  01/29/17 2031     Glucose 248 (H) mg/dL     Narrative:       Meter: [id number] : [id number] Mio April    CBC (No Diff) [[id number]]  (Abnormal) Collected:  01/30/17  0518    Specimen:  Blood Updated:  01/30/17 0619     WBC 17.89 (H) 10*3/mm3      RBC 4.38 10*6/mm3      Hemoglobin 12.6 (L) g/dL      Hematocrit 37.4 (L) %      MCV 85.4 fL      MCH 28.8 pg      MCHC 33.7 g/dL      RDW 13.0 %      RDW-SD 40.1 fl      MPV 11.2 fL      Platelets 222 10*3/mm3     Renal Function Panel [[id number]]  (Abnormal) Collected:  01/30/17 0518    Specimen:  Blood Updated:  01/30/17 0643     Glucose 261 (H) mg/dL      BUN 10 mg/dL      Creatinine 0.60 mg/dL      Sodium 131 (L) mmol/L      Potassium 3.4 (L) mmol/L      Chloride 92 (L) mmol/L      CO2 30.0 mmol/L      Calcium 9.1 mg/dL      Albumin 3.40 g/dL      Phosphorus 3.2 mg/dL      Anion Gap 9.0 mmol/L      BUN/Creatinine Ratio 16.7      eGFR Non African Amer 144 mL/min/1.73     Narrative:       National Kidney [employer] Guidelines    Stage                           Description                             GFR                      1                               Normal or High                          90+  2                               Mild decrease                            60-89  3                               Moderate decrease                   30-59  4                               Severe decrease                       15-29  5                               Kidney failure                             <15    Protime-INR [[id number]]  (Abnormal) Collected:  01/30/17 0519    Specimen:  Blood Updated:  01/30/17 0713     Protime 11.6 (H) Seconds      INR 1.06     Narrative:       Therapeutic Ranges for INR: 2.0-3.0 (PT 20-30)                              2.5-3.5 (PT 25-34)    POC Glucose Fingerstick [[id number]]  (Abnormal) Collected:  01/30/17 0809    Specimen:  Blood Updated:  01/30/17 0813     Glucose 286 (H) mg/dL     Narrative:       Meter: [id number] : [id number] Jostin Miller    POC Glucose Fingerstick [[id number]]  (Abnormal) Collected:  01/30/17 1104    Specimen:  Blood Updated:  01/30/17 1107     Glucose 296 (H) mg/dL     Narrative:        Meter: [id number] : [id number] Jostin Miller    Blood Culture [[id number]]  (Normal) Collected:  01/25/17 1225    Specimen:  Blood from Hand, Right Updated:  01/30/17 1301     Blood Culture No growth at 5 days     Blood Culture [[id number]]  (Normal) Collected:  01/25/17 1220    Specimen:  Blood from Arm, Right Updated:  01/30/17 1301     Blood Culture No growth at 5 days     POC Glucose Fingerstick [[id number]]  (Abnormal) Collected:  01/30/17 1756    Specimen:  Blood Updated:  01/30/17 1800     Glucose 251 (H) mg/dL     Narrative:       Meter: [id number] : [id number] Jostin Miller    POC Glucose Fingerstick [[id number]]  (Abnormal) Collected:  01/30/17 2005    Specimen:  Blood Updated:  01/30/17 2006     Glucose 399 (H) mg/dL     Narrative:       Meter: [id number] : [id number] Mio April    CBC & Differential [[id number]] Collected:  01/31/17 0417    Specimen:  Blood Updated:  01/31/17 0446    Narrative:       The following orders were created for panel order CBC & Differential.  Procedure                               Abnormality         Status                     ---------                               -----------         ------                     CBC Auto Differential[[id number]]         Abnormal            Final result                 Please view results for these tests on the individual orders.    CBC Auto Differential [[id number]]  (Abnormal) Collected:  01/31/17 0417    Specimen:  Blood Updated:  01/31/17 0446     WBC 12.24 (H) 10*3/mm3      RBC 4.06 (L) 10*6/mm3      Hemoglobin 11.6 (L) g/dL      Hematocrit 34.6 (L) %      MCV 85.2 fL      MCH 28.6 pg      MCHC 33.5 g/dL      RDW 13.0 %      RDW-SD 40.4 fl      MPV 10.9 fL      Platelets 220 10*3/mm3      Neutrophil % 91.2 (H) %      Lymphocyte % 5.3 (L) %      Monocyte % 2.6 %      Eosinophil % 0.0 %      Basophil % 0.1 %      Immature Grans % 0.8 (H) %      Neutrophils, Absolute 11.16 (H) 10*3/mm3      Lymphocytes, Absolute 0.65 10*3/mm3      Monocytes,  Absolute 0.32 10*3/mm3      Eosinophils, Absolute 0.00 (L) 10*3/mm3      Basophils, Absolute 0.01 10*3/mm3      Immature Grans, Absolute 0.10 (H) 10*3/mm3     Basic Metabolic Panel [[id number]]  (Abnormal) Collected:  01/31/17 0417    Specimen:  Blood Updated:  01/31/17 0529     Glucose 560 (C) mg/dL       Confirmed/called        BUN 11 mg/dL      Creatinine 0.60 mg/dL      Sodium 131 (L) mmol/L      Potassium 3.8 mmol/L      Chloride 95 (L) mmol/L      CO2 28.0 mmol/L      Calcium 9.0 mg/dL      eGFR Non African Amer 144 mL/min/1.73      BUN/Creatinine Ratio 18.3      Anion Gap 8.0 mmol/L     Narrative:       National Kidney Foundation Guidelines    Stage                           Description                             GFR                      1                               Normal or High                          90+  2                               Mild decrease                            60-89  3                               Moderate decrease                   30-59  4                               Severe decrease                       15-29  5                               Kidney failure                             <15    POC Glucose Fingerstick [[id number]]  (Abnormal) Collected:  01/31/17 0532    Specimen:  Blood Updated:  01/31/17 0544     Glucose 557 (C) mg/dL     Narrative:       Verify with Lab Meter: [id number] : [id number] Jose Noyola    POC Glucose Fingerstick [[id number]]  (Abnormal) Collected:  01/31/17 0751    Specimen:  Blood Updated:  01/31/17 0755     Glucose 458 (C) mg/dL     Narrative:       Confirmed by Repeat Meter: [id number] : [id number] Gorge Rodriguez    POC Glucose Fingerstick [[id number]]  (Abnormal) Collected:  01/31/17 0753    Specimen:  Blood Updated:  01/31/17 0755     Glucose 418 (H) mg/dL     Narrative:       Confirmed by Repeat Meter: [id number] : [id number] Gorge Rodriguez    Tissue Exam [[id number]] Collected:  01/30/17 1640    Specimen:  Surgical Site from Toe, Left  Updated:  01/31/17 0823    POC Glucose Fingerstick [[id number]]  (Abnormal) Collected:  01/31/17 1123    Specimen:  Blood Updated:  01/31/17 1126     Glucose 212 (H) mg/dL     Narrative:       Meter: [id number] : [id number] Gorge Rodriguez    Vancomycin, Trough Trough scheduled for 1/31 at 1130. Do not give 1200 dose until level has been evaluated by pharmacy. [[id number]]  (Abnormal) Collected:  01/31/17 1148    Specimen:  Blood Updated:  01/31/17 1244     Vancomycin Trough 5.50 (L) mcg/mL     Wound Culture [[id number]]  (Abnormal) Collected:  01/28/17 1440    Specimen:  Wound from Foot, Left Updated:  01/31/17 1328     Wound Culture        Light growth (2+) Staphylococcus, coagulase negative (A)     Gram Stain Result Rare (1+) WBCs seen       No organisms seen     POC Glucose Fingerstick [[id number]]  (Normal) Collected:  01/31/17 1655    Specimen:  Blood Updated:  01/31/17 1703     Glucose 126 mg/dL     Narrative:       Meter: [id number] : [id number] Bonilla Albrecht          Imaging Results (last 72 hours)     Procedure Component Value Units Date/Time    XR Chest PA & Lateral [[id number]] Collected:  01/27/17 1603     Updated:  01/30/17 0903    Narrative:       EXAMINATION: XR CHEST, PA AND LATERAL-01/27/2017:      INDICATION: Rule out pneumonia; L03.116-Cellulitis of left lower limb.      COMPARISON: 05/02/2008.     FINDINGS: The heart size is normal. There is no pneumothorax or  effusion. An area of increased attenuation is present in the left  costophrenic angle. The osseous structures of the chest are normal.           Impression:       Atelectasis versus infiltrate of left lower lobe/left  costophrenic angle.     D:  01/27/2017  E:  01/27/2017     This report was finalized on 1/30/2017 9:01 AM by Dr. Armando Morejon MD.       CT Lower Extremity Left With Contrast [[id number]] Collected:  01/25/17 1431     Updated:  01/30/17 1156    Narrative:       EXAMINATION:  CT SCAN OF THE LEFT FOOT WITH CONTRAST 01/25/2017      HISTORY: Left foot ulcer, cellulitis, history of diabetes     TECHNICAL: Exam consists of spiral acquisition 2 mm axial images through  the left foot with sagittal and coronal reconstructions at 2 mm.     The radiation dose reduction device was turned on for each scan per the  ALARA (As Low as Reasonably Achievable) protocol.      COMPARISON: None     FINDINGS: The patient's known foot ulcer is seen as air density in the  superficial soft tissues of the pad of the forefoot, between the fourth  and fifth MTP joints. Air is confined to the superficial 2 or 3 mm of  the ulceration and no deep soft tissue gas is appreciated. There is  focal dense edema over a roughly 3 cm area, and a roughly 15 mm area of  subcutaneous phlegmon, without evidence of discrete drainable abscess.  The phlegmonous area is contiguous with the soft tissue thickening  around the fifth MTP joint. The ulceration shows inflammation almost  contiguous with soft tissue thickening around the fourth MTP joint. No  bony erosive changes are seen to suggest osteomyelitis. There is no  obvious fluid collection within the joints to suggest septic arthritis.  Bony structures generally appear intact and normally mineralized. No  fracture, destructive bony lesion or periosteal reaction is seen.       Impression:       Plantar ulcer deep to the fourth MTP joint, and focal  plantar phlegmon deep to the fifth MTP joint, that may represent  impending ulcer formation. Marginal involvement of the fourth and fifth  MTP joints but no evidence of septic arthritis, osteomyelitis, or  drainable abscess.     D:  01/252017  E:  01/25/2017     This report was finalized on 1/30/2017 11:54 AM by DR. Marquise Stafford MD.             Operative/Procedure Notes (last 72 hours) (Notes from 1/28/2017 10:59 PM through 1/31/2017 10:59 PM)     No notes of this type exist for this encounter.           Physician Progress Notes (last 72 hours) (Notes from 1/28/2017 10:59 PM through  1/31/2017 10:59 PM)      Gilbert Smith MD at 1/29/2017  8:36 AM  Version 1 of 1             [facility] Medicine Services  INPATIENT PROGRESS NOTE    Date of Admission: 1/25/2017  Length of Stay: 4  Primary Care Physician: Juan Rios MD    Subjective     Chief Complaint:  Chills and nausea and low grade temps    HPI:  No major events overnight.  The patient's making progress.  The patient denies chest pain, dyspnea, orthopnea or paroxysmal nocturnal dyspnea.  Denies fever, cough, abdominal pain, diarrhea or constipation.      Review Of Systems:   Review of Systems  Foot ulcer has been there a month  Has a podiatrist - dr brown  Wasn't caring for DM but just re establishign with Dr Mcdonald - as taking only metf at home      Objective      Temp:  [98.7 °F (37.1 °C)-99.3 °F (37.4 °C)] 98.9 °F (37.2 °C)  Heart Rate:  [86-92] 86  Resp:  [16-20] 20  BP: (123-144)/(91) 123/91  Physical Exam  Gen:  The patient is awake and alert.  Poor dentition.  No apparent distress.    Neuro: alert and oriented, clear speech, follows commands, no sensation in feet or lower legs  HEENT:  NC/AT PERRL, OP benign  Neck:  Supple, no LAD  Heart RRR no murmur, rub, or gallop  Lungs nonlaobred ctta  Abd:  Soft, nontender, no rebound or guarding, pos BS  Extrem:  No c/c/e.  Left foot covered with occlusive dressing.  Weak peripheral pulses    Results Review:    I have reviewed the labs, radiology results and diagnostic studies.  1.  Elevated glucose of 400  2.  Sodium 132.  Electrolytes within normal range  3.  Elevated hemoglobin A1c at 13.5  4.  Leukocytosis at 21,000 admission improved to 15,000  5.  Urinalysis more than 1011/dL glucose.  3+ positive ketones.  1+ protein.  Microscopic examination unremarkable    Electrocardiogram personally reviewed and shows normal sinus rhythm.  Incomplete left bundle branch block  Chest x-ray personally reviewed and shows evidence of left lower lobe atelectasis  Arterial  Doppler results pending  CT scan with contrast results of left lower extremity reviewed.  Plantar ulcer deep to the fourth metatarsophalangeal joint with plantar phlegmon deep to the fifth MTP joint that may represent impending ulcer formation.  Marginal involvement of the fourth and fifth MTP joints but no evidence of septic arthritis, osteoarthritis or drainable abscess    Microbiology: Blood cultures no growth  MRI results of the left foot reviewed.  Edema signal along the plantar aspect of the fourth and fifth metatarsophalangeal joints without evidence of abscess.  No indication of osteomyelitis  I have reviewed the medications.    Assessment/Plan     Assessment/Problem List  Principal Problem:    Cellulitis of left foot  Active Problems:    Essential hypertension    Poorly controlled type 2 diabetes mellitus with peripheral neuropathy    Type 2 diabetes mellitus with foot ulcer, with long-term current use of insulin    DUNLAP (nonalcoholic steatohepatitis)    Hyponatremia    Neutrophilic leukocytosis      This is a 46-year-old morbidly obese  male with a past medical history significant for type 2 diabetes mellitus insulin-dependent poorly controlled with diabetic microvascular disease including retinopathy, neuropathy and possible nephropathy, poor medical compliance (patient has not been taking any medications for the past few months), hypertension, hyperlipidemia  No known history of coronary artery disease or peripheral vascular disease  Developed left plantar ulcer following callous removal, complicated by superinfection and failure of outpatient about a treatment      1.  Diabetic foot ulcer of the left plantar area overlying the fourth and fifth metatarsophalangeal joints with phlegmon in the setting of underlying peripheral vascular disease as evidenced by CARLI measurements on the left side which was not calculated due to noncompressible vessels suggestive of disease involving the tibioperoneal  arteries  Plan:  - On broad-spectrum empiric intravenous antibiotic treatment with Zosyn and vancomycin.  Wound culture no growth.  Blood cultures no growth.    = Appreciate CT surgery consultation ID. further evaluation for possible revascularization amputation per CT surgery    2.  Type 2 diabetes mellitus, insulin-dependent, poorly controlled with diabetic microvascular disease including retinopathy, neuropathy and nephropathy  Plan:  Increase Levemir to 35 units every morning on January 29, 2017.    Increase lispro to 15  units subcutaneous 3 times a day with meals and continue sliding scale  Added atorvastatin  Add aspirin    3.  Hyperosmolar hyponatremia due to hyperglycemia and solvent drag.  Improving          History of essential hypertension.  Started lisinopril 5 mg daily due to the presence of proteinuria and diabetic nephropathy  History of DUNLAP (nonalcoholic steatohepatitis).  Stable.  No evidence of decompensated liver disease        Gilbert Smith MD   01/29/17   8:36 AM    Please note that portions of this note may have been completed with a voice recognition program. Efforts were made to edit the dictations, but occasionally words are mistranscribed.       Electronically signed by Gilbert Smith MD at 1/29/2017  8:42 AM      Arthur Corbin MD at 1/29/2017 12:04 PM  Version 1 of 1             Subjective:  Pre op for AM.  Seems well prepared        Objective:    Vital Signs  Temp:  [98.7 °F (37.1 °C)-99.3 °F (37.4 °C)] 98.9 °F (37.2 °C)  Heart Rate:  [86-92] 86  Resp:  [16-20] 20  BP: (123-144)/(91) 123/91    Physical Exam:   General Appearance: alert, appears stated age and cooperative   Lungs: clear to auscultation    Heart: regular rhythm & normal rate, normal S1, S2,no murmur    Skin:warm and dry       Plan   OR in AM for toe amputation    Arthur Corbin MD  01/29/17  12:05 PM     Electronically signed by Arthur Corbin MD at 1/29/2017 12:05 PM      LAUREN Felipe at 1/30/2017   7:41 AM  Version 1 of 1         CTS Progress Note     LOS: 5 days     Subjective  Doing well this am, no complaints    Objective    Vital Signs  Temp:  [98 °F (36.7 °C)-99.9 °F (37.7 °C)] 98 °F (36.7 °C)  Heart Rate:  [81-94] 81  Resp:  [18] 18  BP: (124-142)/(76-86) 124/86    Physical Exam:   General Appearance: alert, appears stated age and cooperative   Lungs: clear to auscultation     Heart: regular rhythm & normal rate, normal S1, S2 and no murmur, no clau, no rub   Ext: Erythema left foot and plantar ulcer. Ulcer on right foot.      Results     Results from last 7 days  Lab Units 01/30/17  0518   WBC 10*3/mm3 17.89*   HEMOGLOBIN g/dL 12.6*   HEMATOCRIT % 37.4*   PLATELETS 10*3/mm3 222       Results from last 7 days  Lab Units 01/30/17  0518   SODIUM mmol/L 131*   POTASSIUM mmol/L 3.4*   CHLORIDE mmol/L 92*   TOTAL CO2 mmol/L 30.0   BUN mg/dL 10   CREATININE mg/dL 0.60   GLUCOSE mg/dL 261*   CALCIUM mg/dL 9.1       Assessment  46 year old  male with history of uncontrolled diabetes mellitus presents with left 4th/5th plantar ulcer with dorsal cellulitis    Plan   Diabetes education  Plan for Left 4th/5th metatarsal amputation with Dr Martinez today      LAUREN Pope  CTSurgery  01/30/17   7:41 AM           Electronically signed by LAUREN Felipe at 1/31/2017  8:12 AM      Gilbert Smith MD at 1/30/2017  7:58 AM  Version 1 of 1             [facility] Medicine Services  INPATIENT PROGRESS NOTE    Date of Admission: 1/25/2017  Length of Stay: 5  Primary Care Physician: Juan Rios MD    Subjective     Chief Complaint:  Chills and nausea and low grade temps    HPI:   The patient denies chest pain, dyspnea, orthopnea or paroxysmal nocturnal dyspnea.  Denies fever, cough, abdominal pain, diarrhea or constipation.      Review Of Systems:   Review of Systems  Foot ulcer has been there a month  Has a podiatrist - dr brown  Wasn't caring for DM but just re  establishign with Dr Harry dotson taking only metf at home      Objective      Temp:  [98 °F (36.7 °C)-99.9 °F (37.7 °C)] 98 °F (36.7 °C)  Heart Rate:  [81-94] 81  Resp:  [18] 18  BP: (124-142)/(76-86) 124/86  Physical Exam  Gen:  The patient is awake and alert.  Poor dentition.  No apparent distress.    Neuro: alert and oriented, clear speech, follows commands, no sensation in feet or lower legs  HEENT:  NC/AT PERRL, OP benign  Neck:  Supple, no LAD  Heart RRR no murmur, rub, or gallop  Lungs nonlaobred ctta  Abd:  Soft, nontender, no rebound or guarding, pos BS  Extrem:  No c/c/e.  Left foot covered with occlusive dressing.  Weak peripheral pulses    Results Review:    I have reviewed the labs, radiology results and diagnostic studies.  1.  Elevated glucose of 400  2.  Sodium 132.  Electrolytes within normal range  3.  Elevated hemoglobin A1c at 13.5  4.  Leukocytosis at 21,000 admission improved to 15,000  5.  Urinalysis more than 1011/dL glucose.  3+ positive ketones.  1+ protein.  Microscopic examination unremarkable    Electrocardiogram personally reviewed and shows normal sinus rhythm.  Incomplete left bundle branch block  Chest x-ray personally reviewed and shows evidence of left lower lobe atelectasis  Arterial Doppler results pending  CT scan with contrast results of left lower extremity reviewed.  Plantar ulcer deep to the fourth metatarsophalangeal joint with plantar phlegmon deep to the fifth MTP joint that may represent impending ulcer formation.  Marginal involvement of the fourth and fifth MTP joints but no evidence of septic arthritis, osteoarthritis or drainable abscess    Microbiology: Blood cultures no growth  MRI results of the left foot reviewed.  Edema signal along the plantar aspect of the fourth and fifth metatarsophalangeal joints without evidence of abscess.  No indication of osteomyelitis  I have reviewed the medications.    Assessment/Plan     Assessment/Problem List  Principal Problem:     Cellulitis of left foot  Active Problems:    Essential hypertension    Poorly controlled type 2 diabetes mellitus with peripheral neuropathy    Type 2 diabetes mellitus with foot ulcer, with long-term current use of insulin    DUNLAP (nonalcoholic steatohepatitis)    Hyponatremia    Neutrophilic leukocytosis      This is a 46-year-old morbidly obese  male with a past medical history significant for type 2 diabetes mellitus insulin-dependent poorly controlled with diabetic microvascular disease including retinopathy, neuropathy and possible nephropathy, poor medical compliance (patient has not been taking any medications for the past few months), hypertension, hyperlipidemia  No known history of coronary artery disease or peripheral vascular disease  Developed left plantar ulcer following callous removal, complicated by superinfection and failure of outpatient about a treatment      1.  Diabetic foot ulcer of the left plantar area overlying the fourth and fifth metatarsophalangeal joints with phlegmon in the setting of underlying peripheral vascular disease as evidenced by CARLI measurements on the left side which was not calculated due to noncompressible vessels suggestive of disease involving the tibioperoneal arteries  Plan:  - On broad-spectrum empiric intravenous antibiotic treatment with Zosyn and vancomycin.  Wound culture no growth.  Blood cultures no growth.    = Appreciate CT surgery consultation ID. further evaluation for possible revascularization amputation per CT surgery    2.  Type 2 diabetes mellitus, insulin-dependent, poorly controlled with diabetic microvascular disease including retinopathy, neuropathy and nephropathy  Plan:  Increase Levemir to 42 units daily on 1/30/27    Continue lispro to 15  units subcutaneous 3 times a day with meals and continue sliding scale  Added atorvastatin  Add aspirin    3.  Hyperosmolar hyponatremia due to hyperglycemia and solvent drag.  Resolved     4.  History of essential hypertension.  Started lisinopril 5 mg daily due to the presence of proteinuria and diabetic nephropathy    5. History of DUNLAP (nonalcoholic steatohepatitis).  Stable.  No evidence of decompensated liver disease    Discussed with Dr. Michelle Smith MD   01/30/17   7:58 AM    Please note that portions of this note may have been completed with a voice recognition program. Efforts were made to edit the dictations, but occasionally words are mistranscribed.       Electronically signed by Gilbert Smith MD at 1/30/2017  7:59 AM      Juancho Martinez MD at 1/31/2017  7:44 AM  Version 1 of 1         Cardiothoracic Surgery Progress Note      POD # 1 s/p left transmetatarsal amputation 5/4/3       LOS: 6 days      Subjective:  No complaints this morning.  Denies pain      Objective:  Vital Signs  Temp:  [97.4 °F (36.3 °C)-98.7 °F (37.1 °C)] 98.3 °F (36.8 °C)  Heart Rate:  [73-92] 73  Resp:  [18-20] 20  BP: (139-177)/() 177/102    Physical Exam:   General Appearance: alert, appears stated age and cooperative   Lungs: CTAB   Heart: RRR   Ext:Dressing dry     Results:    Results from last 7 days  Lab Units 01/31/17  0417   WBC 10*3/mm3 12.24*   HEMOGLOBIN g/dL 11.6*   HEMATOCRIT % 34.6*   PLATELETS 10*3/mm3 220       Results from last 7 days  Lab Units 01/31/17  0417   SODIUM mmol/L 131*   POTASSIUM mmol/L 3.8   CHLORIDE mmol/L 95*   TOTAL CO2 mmol/L 28.0   BUN mg/dL 11   CREATININE mg/dL 0.60   GLUCOSE mg/dL 560*   CALCIUM mg/dL 9.0         Assessment:  POD # 1 s/p left transmetatarsal amputation, expected recovery    Plan:  BID saline soaked wet to dry dressings  ABX as per ID - Extensive soft tissue infection, at risk for foot loss  Elevate left foot  Non-weight bearing left foot     LAUREN Crooks  01/31/17  7:44 AM           Electronically signed by Juancho Martinez MD at 1/31/2017  8:31 AM      Usman Dong MD at 1/31/2017  8:29 AM  Version 1 of 1         [facility]  "Progress Note    1/25/2017      Antibiotics:  IV Anti-Infectives     Ordered     Dose/Rate Route Frequency Start Stop    01/30/17 1659  cefuroxime (ZINACEF) IVPB 1.5 g     Ordering Provider:  LAUREN Rahman    1.5 g Intravenous Every 8 Hours 01/31/17 0000 01/31/17 1559    01/26/17 1328  piperacillin-tazobactam (ZOSYN) 4.5 g in dextrose 100 mL IVPB (premix)     Ordering Provider:  Sandra White MD    4.5 g Intravenous Every 8 Hours 01/26/17 1400      01/25/17 1451  vancomycin (VANCOCIN) IVPB 1 g (premix) in Dextrose 5% 200 mL     Ordering Provider:  Andrei Spring RPH    1,000 mg Intravenous Every 12 Hours 01/26/17 0000      01/25/17 1213  cefTRIAXone (ROCEPHIN) IVPB 2 g     Ordering Provider:  LAUREN Hernández    2 g  over 30 Minutes Intravenous Once 01/25/17 1300 01/25/17 1331    01/25/17 1230  vancomycin 2000 mg/500 mL 0.9% NS IVPB ([facility])     Ordering Provider:  LAUREN Hernández    2,000 mg  over 120 Minutes Intravenous Once 01/25/17 1300 01/25/17 1555    01/25/17 1213  Pharmacy to dose vancomycin     Ordering Provider:  LAUREN Hernández     Does not apply Continuous PRN 01/25/17 1213            CC: Left foot infection    HPI:  Dr. White consulted on January 26, 2017, noted:  Patient is a 46 y.o. Yr old male with CC of severe left foot infection in the setting of untreated diabetes mellitus (a1c 14.9), extensive comorbidity and claudication. He has had a chronic plantar ulcer at the [facility] of unknown duration. About 1 week ago he noticed change in color of the dorsum of his foot He was seen at the [facility] ER and says that they \"took a piece of tissue off his foot\" and prescribed an antibiotic According to the chart he was prescribed bactrim by his pcp The patient's wife is not present and he has poor recall of his medical history.     1/30 surgery by Dr. Martinez    1/31/17 patient denies new pain and no new redness/swelling at the left leg.  He denies new intolerance to antibiotics.    ROS:  " "    1/31/17 No f/c/s. No n/v/d. No rash. No new ADR to Abx.     PE:   Visit Vitals   • BP (!) 177/102 (BP Location: Left arm, Patient Position: Lying)  Comment: nurse notifed   • Pulse 73   • Temp 98.3 °F (36.8 °C) (Oral)   • Resp 20   • Ht 75\" (190.5 cm)   • Wt 248 lb 9.6 oz (113 kg)   • SpO2 94%   • BMI 31.07 kg/m2       GENERAL: Awake and alert, in no acute distress.   HEENT:  No conjunctival injection. No icterus. Oropharynx clear without evidence of thrush or exudate.     HEART: RRR; No murmur, rubs, gallops.   LUNGS: Clear to auscultation bilaterally without wheezing, rales, rhonchi. Normal respiratory effort. Nonlabored. No dullness.  ABDOMEN: Soft, nontender, nondistended. Positive bowel sounds. No rebound or guarding. NO mass or HSM.  EXT:  No cyanosis, clubbing or edema. No cord.  : Genitalia generally unremarkable.  Without Walker catheter.  SKIN: Warm and dry without cutaneous eruptions on Inspection/palpation aside from below.      Right foot with plantar ulcer w/o associated erythema or soft tissue swelling  Skin with multiple old scars necrobiosis and old abcsesses    Left foot surgical site noted.  Vague erythema/edema and no obvious purulence.  No crepitus or bulla.  No discrete mass bulge or fluctuance.    Laboratory Data      Results from last 7 days  Lab Units 01/31/17  0417 01/30/17  0518 01/28/17  0614   WBC 10*3/mm3 12.24* 17.89* 15.56*   HEMOGLOBIN g/dL 11.6* 12.6* 12.4*   HEMATOCRIT % 34.6* 37.4* 36.0*   PLATELETS 10*3/mm3 220 222 188       Results from last 7 days  Lab Units 01/31/17  0417   SODIUM mmol/L 131*   POTASSIUM mmol/L 3.8   CHLORIDE mmol/L 95*   TOTAL CO2 mmol/L 28.0   BUN mg/dL 11   CREATININE mg/dL 0.60   GLUCOSE mg/dL 560*   CALCIUM mg/dL 9.0       Results from last 7 days  Lab Units 01/26/17  0702   ALK PHOS U/L 125*   BILIRUBIN mg/dL 0.4   ALT (SGPT) U/L 18   AST (SGOT) U/L 16       Results from last 7 days  Lab Units 01/25/17  1233   SED RATE mm/hr 61*       Results from " last 7 days  Lab Units 01/25/17  1220   CRP mg/dL 188.40*       Estimated Creatinine Clearance: 204.2 mL/min (by C-G formula based on Cr of 0.6).      Microbiology:      Radiology:  Imaging Results (last 72 hours)     Procedure Component Value Units Date/Time    XR Chest PA & Lateral [[id number]] Collected:  01/27/17 1603     Updated:  01/30/17 0903    Narrative:       EXAMINATION: XR CHEST, PA AND LATERAL-01/27/2017:      INDICATION: Rule out pneumonia; L03.116-Cellulitis of left lower limb.      COMPARISON: 05/02/2008.     FINDINGS: The heart size is normal. There is no pneumothorax or  effusion. An area of increased attenuation is present in the left  costophrenic angle. The osseous structures of the chest are normal.           Impression:       Atelectasis versus infiltrate of left lower lobe/left  costophrenic angle.     D:  01/27/2017  E:  01/27/2017     This report was finalized on 1/30/2017 9:01 AM by Dr. Armando Morejon MD.       CT Lower Extremity Left With Contrast [[id number]] Collected:  01/25/17 1431     Updated:  01/30/17 1156    Narrative:       EXAMINATION:  CT SCAN OF THE LEFT FOOT WITH CONTRAST 01/25/2017     HISTORY: Left foot ulcer, cellulitis, history of diabetes     TECHNICAL: Exam consists of spiral acquisition 2 mm axial images through  the left foot with sagittal and coronal reconstructions at 2 mm.     The radiation dose reduction device was turned on for each scan per the  ALARA (As Low as Reasonably Achievable) protocol.      COMPARISON: None     FINDINGS: The patient's known foot ulcer is seen as air density in the  superficial soft tissues of the pad of the forefoot, between the fourth  and fifth MTP joints. Air is confined to the superficial 2 or 3 mm of  the ulceration and no deep soft tissue gas is appreciated. There is  focal dense edema over a roughly 3 cm area, and a roughly 15 mm area of  subcutaneous phlegmon, without evidence of discrete drainable abscess.  The phlegmonous area is  contiguous with the soft tissue thickening  around the fifth MTP joint. The ulceration shows inflammation almost  contiguous with soft tissue thickening around the fourth MTP joint. No  bony erosive changes are seen to suggest osteomyelitis. There is no  obvious fluid collection within the joints to suggest septic arthritis.  Bony structures generally appear intact and normally mineralized. No  fracture, destructive bony lesion or periosteal reaction is seen.       Impression:       Plantar ulcer deep to the fourth MTP joint, and focal  plantar phlegmon deep to the fifth MTP joint, that may represent  impending ulcer formation. Marginal involvement of the fourth and fifth  MTP joints but no evidence of septic arthritis, osteomyelitis, or  drainable abscess.     D:  01/033663  E:  01/25/2017     This report was finalized on 1/30/2017 11:54 AM by DR. Marquise Stafford MD.               Impression:   1 Extensive left foot infection- no osteo per MRI but  deep infection and had surgery January 30 by Dr. Martinez.  Need to review operative note and discussed with him with respect to surgical findings.  Broad-spectrum antibiotics ongoing with adjustments to depend on culture data/clinical course and response to therapy.  Patient understands surgery/antibiotics are not a guarantee for cure.  He will have a ongoing risk for nonhealing/relapse and functional/limb loss with potential for higher level amputation.     2 DM a1c 14.9--you need to tightly control blood sugar to give best chance for healing     3 R/O PVD with history concerning for claudication     4 Stage 4 hepatic fibrosis And dx DUNLAP     5 Hx Lissa's Gangrene    PLAN:   --Vancomycin/Zosyn    --I discussed potential risks and benefits of the prescribed antibiotics that include, but are not limited to, solid organ toxicity, neuro/bala/vestibular toxicity, renal toxicity, CDiff, cytopenias, hypersensitivity,  etc.. Patient/Family voice understanding and agree to  proceed.    --Discussed with microbiology    --Partial history per nursing staff    --Highly complex set of issues with high risk for further serious morbidity and other serious sequela    Usman Dong MD  1/31/2017           Electronically signed by Usman Dong MD at 1/31/2017  8:35 AM      Gilbert Smith MD at 1/31/2017 12:36 PM  Version 1 of 1             [facility] Medicine Services  INPATIENT PROGRESS NOTE    Date of Admission: 1/25/2017  Length of Stay: 6  Primary Care Physician: Juan Rios MD    Subjective     Chief Complaint:  Chills and nausea and low grade temps    HPI:  Elevated glucose this am.  The patient denies chest pain, dyspnea, orthopnea or paroxysmal nocturnal dyspnea.  Denies fever, cough, abdominal pain, diarrhea or constipation.      Review Of Systems:   Review of Systems  Foot ulcer has been there a month  Has a podiatrist - dr brown  Wasn't caring for DM but just re establishign with Dr Mcdonald - as taking only metf at home      Objective      Temp:  [97.4 °F (36.3 °C)-98.7 °F (37.1 °C)] 97.6 °F (36.4 °C)  Heart Rate:  [73-92] 79  Resp:  [18-20] 18  BP: (139-177)/() 147/87  Physical Exam  Gen:  The patient is awake and alert.  Poor dentition.  No apparent distress.    Neuro: alert and oriented, clear speech, follows commands, no sensation in feet or lower legs  HEENT:  NC/AT PERRL, OP benign  Neck:  Supple, no LAD  Heart RRR no murmur, rub, or gallop  Lungs nonlaobred ctta  Abd:  Soft, nontender, no rebound or guarding, pos BS  Extrem:  No c/c/e.  Left foot covered with occlusive dressing.  Weak peripheral pulses    Results Review:    I have reviewed the labs, radiology results and diagnostic studies.  1.  Elevated glucose of 400  2.  Sodium 132.  Electrolytes within normal range  3.  Elevated hemoglobin A1c at 13.5  4.  Leukocytosis at 21,000 admission improved to 15,000  5.  Urinalysis more than 1011/dL glucose.  3+ positive ketones.   1+ protein.  Microscopic examination unremarkable    Electrocardiogram personally reviewed and shows normal sinus rhythm.  Incomplete left bundle branch block  Chest x-ray personally reviewed and shows evidence of left lower lobe atelectasis  Arterial Doppler results pending  CT scan with contrast results of left lower extremity reviewed.  Plantar ulcer deep to the fourth metatarsophalangeal joint with plantar phlegmon deep to the fifth MTP joint that may represent impending ulcer formation.  Marginal involvement of the fourth and fifth MTP joints but no evidence of septic arthritis, osteoarthritis or drainable abscess    Microbiology: Blood cultures no growth  MRI results of the left foot reviewed.  Edema signal along the plantar aspect of the fourth and fifth metatarsophalangeal joints without evidence of abscess.  No indication of osteomyelitis  I have reviewed the medications.    Assessment/Plan     Assessment/Problem List  Principal Problem:    Cellulitis of left foot  Active Problems:    Essential hypertension    Poorly controlled type 2 diabetes mellitus with peripheral neuropathy    Type 2 diabetes mellitus with foot ulcer, with long-term current use of insulin    DUNLAP (nonalcoholic steatohepatitis)    Hyponatremia    Neutrophilic leukocytosis      This is a 46-year-old morbidly obese  male with a past medical history significant for type 2 diabetes mellitus insulin-dependent poorly controlled with diabetic microvascular disease including retinopathy, neuropathy and possible nephropathy, poor medical compliance (patient has not been taking any medications for the past few months), hypertension, hyperlipidemia  No known history of coronary artery disease or peripheral vascular disease  Developed left plantar ulcer following callous removal, complicated by superinfection and failure of outpatient about a treatment      1.  Diabetic foot ulcer of the left plantar area overlying the fourth and fifth  metatarsophalangeal joints with phlegmon in the setting of underlying peripheral vascular disease as evidenced by CARLI measurements on the left side which was not calculated due to noncompressible vessels suggestive of disease involving the tibioperoneal arteries  Plan:  Post operative day number 1 s/p 4/5th toe TM amputation  - On broad-spectrum empiric intravenous antibiotic treatment with Zosyn and vancomycin.  Wound culture no growth.  Blood cultures no growth.     further evaluation for possible revascularization amputation per CT surgery    2.  Type 2 diabetes mellitus, insulin-dependent, poorly controlled with diabetic microvascular disease including retinopathy, neuropathy and nephropathy  Plan:  Increase Levemir to 60 units daily on 1/31/27    Continue lispro to 20  units subcutaneous 3 times a day with meals and continue sliding scale  Added atorvastatin  Add aspirin    3.  Hyperosmolar hyponatremia due to hyperglycemia and solvent drag.  Resolved     4. History of essential hypertension.  Good control    5. History of DUNLAP (nonalcoholic steatohepatitis).  Stable.  No evidence of decompensated liver disease    Discussed with Dr. Michelle Smith MD   01/31/17   12:36 PM    Please note that portions of this note may have been completed with a voice recognition program. Efforts were made to edit the dictations, but occasionally words are mistranscribed.       Electronically signed by Gilbert Smith MD at 1/31/2017 12:38 PM        Consult Notes (last 72 hours) (Notes from 1/28/2017 10:59 PM through 1/31/2017 10:59 PM)     No notes of this type exist for this encounter.

## 2017-02-01 NOTE — PROGRESS NOTES
CTS Progress Note      POD 2 s/p Left 3rd, 4th, 5th transmetatarsal amputation       LOS: 7 days     Subjective  No acute events overnight. Pain controlled.     Objective    Vital Signs  Temp:  [97.4 °F (36.3 °C)-97.8 °F (36.6 °C)] 97.7 °F (36.5 °C)  Heart Rate:  [72-89] 72  Resp:  [16-20] 16  BP: (126-159)/() 159/96    Physical Exam:   General Appearance: alert, appears stated age and cooperative   Lungs: clear to auscultation    Heart: regular rhythm & normal rate, normal S1, S2 and no murmur, no clau, no rub   Skin: Dressing clean and dry     Results     Results from last 7 days  Lab Units 01/31/17  0417   WBC 10*3/mm3 12.24*   HEMOGLOBIN g/dL 11.6*   HEMATOCRIT % 34.6*   PLATELETS 10*3/mm3 220       Results from last 7 days  Lab Units 01/31/17  0417   SODIUM mmol/L 131*   POTASSIUM mmol/L 3.8   CHLORIDE mmol/L 95*   TOTAL CO2 mmol/L 28.0   BUN mg/dL 11   CREATININE mg/dL 0.60   GLUCOSE mg/dL 560*   CALCIUM mg/dL 9.0       Imaging Results (last 24 hours)     ** No results found for the last 24 hours. **          Assessment    Principal Problem:    Cellulitis of left foot  Active Problems:    Essential hypertension    Poorly controlled type 2 diabetes mellitus with peripheral neuropathy    Type 2 diabetes mellitus with foot ulcer, with long-term current use of insulin    DUNLAP (nonalcoholic steatohepatitis)    Hyponatremia    Neutrophilic leukocytosis        Plan   Abx per ID  Elevate left foot  Non-weight bearing left foot  BID saline wet-to-dry dressing changes  Dispo planning    LAUREN Pope  02/01/17  7:39 AM

## 2017-02-01 NOTE — PROGRESS NOTES
Adult Nutrition  Assessment/PES    Patient Name:  Kendrick Crystal  YOB: 1970  MRN: [id number]  Admit Date:  1/25/2017    Assessment Date:  2/1/2017        Reason for Assessment       02/01/17 0740    Reason for Assessment    Reason For Assessment/Visit length of stay    Time Spent (min) 5                                Problem/Interventions:        Problem 1       02/01/17 0741    Nutrition Diagnoses Problem 1    Problem 1 Nutrition Appropriate for Condition at this Time    Signs/Symptoms (evidenced by) PO Intake                          Education/Evaluation       02/01/17 0741    Monitor/Evaluation    Monitor Per protocol        Comments:      Electronically signed by:  Erinn Melgar RD  02/01/17 7:41 AM

## 2017-02-01 NOTE — OP NOTE
DATE OF PROCEDURE:  01/30/2017    PREOPERATIVE DIAGNOSES:  1.  Left foot plantar ulceration.   2.  Left foot cellulitis.   3.  Uncontrolled diabetes mellitus.   4.  History of Lissa gangrene.   5.  Nonalcoholic steatohepatitis with stage IV fibrosis.    6.  Hypertension.   7.  Peripheral neuropathy.     POSTOPERATIVE DIAGNOSES:  1.  Left foot plantar ulceration.   2.  Left foot cellulitis.   3.  Uncontrolled diabetes mellitus.   4.  History of Lissa gangrene.   5.  Nonalcoholic steatohepatitis with stage IV fibrosis.    6.  Hypertension.   7.  Peripheral neuropathy.     PROCEDURES PERFORMED: Left 5th, 4th and 3rd transmetatarsal amputation.     SURGEON: Juancho Martinez MD    ASSISTANT: Louann Gonzalez PA-C     ANESTHESIA: Juancho Leal CRNA     ESTIMATED BLOOD LOSS:  100 mL    SPECIMENS: Left 5th, 4th and 3rd toes and metatarsals for pathology and culture.     INDICATIONS: The patient is a 46-year-old,  male with poorly controlled diabetes, history of a Lissa gangrene x2, hypertension, peripheral neuropathy, and nonalcoholic steatohepatitis, who presented with a left foot plantar ulcer and cellulitis. He was initially started on oral antibiotics and developed worsening cellulitis prompting consultation. An MRI of the foot was performed that failed to demonstrate osteomyelitis. Given his worsening cellulitis and extensive plantar ulcer extending from the lateral aspect of the foot medially to the 3rd toe it was felt the patient warranted resection to obtain control of this infection. The risks and benefits of surgery were discussed with the patient including pain, bleeding, infection, and potential loss of the left foot with below the knee amputation. The patient understood these risks and wished to proceed with surgery.     DESCRIPTION OF PROCEDURE: The patient was seen in the operating room and placed under general endotracheal anesthesia. The left foot was prepped and draped in the usual sterile  fashion. A timeout was performed, including patient's name, procedure, and antibiotic administration. An incision was made to excise the ulcer from the middle aspect of the foot both on the dorsal and plantar surfaces extending laterally. This incorporated the 3rd phalange. Electrocautery was utilized to dissect down to the metatarsals and it should be noted that there was marginal bleeding from the superficial portions of the wound. The tissues were highly edematous and marginally perfused. The 5th, 4th and 3rd metatarsals were transected along the midportion. Unfortunately, with a mid-metatarsal amputation, the bone was highly prominent within this wound bed. Given the extensive skin and amputation required to achieve viable skin, I did not feel that I could leave these metatarsals within the base of the wound bed for adequate healing. Ultimately, I ended up amputating the metatarsals up to the proximal most aspect. In order to avoid having a joint surface within an open wound bed, I had to amputate the distal aspect of the cuboid bone and the lateral cuneiform bone.  The wound was then inspected for hemostasis and 2-0 Vicryl sutures were placed for arterial bleeding between the 2nd and 3rd metatarsals. The wound was then inspected and found to be hemostatic. Then 3L of antibiotic irrigation were then utilized within the wound bed. The subcutaneous tissues were then reapproximated proximally along the lateral aspect of the foot and the dermal layer using a 2-0 Vicryl suture in an interrupted fashion. I continued this interrupted closure to the middle of this open wound and left the distal aspect of the foot opened for drainage. This was packed with a dry Kerlix sponge and the foot was wrapped with a dry Kerlix followed by gauze fluffs and spandage. The patient tolerated the procedure well and was subsequently extubated in the operating room and transferred to recovery in stable condition.       Juancho Martinez,  COMPA Turner  DD: 02/01/2017 06:10:14  DT: 02/01/2017 07:21:32  Voice Rec. ID #[id number]  Voice Original ID #[id number]  Doc ID #[id number]  Rev. #0  cc:

## 2017-02-01 NOTE — PROGRESS NOTES
"[facility] Progress Note    1/25/2017      Antibiotics:  IV Anti-Infectives     Ordered     Dose/Rate Route Frequency Start Stop    01/31/17 1351  vancomycin IVPB 1250 mg in 250 mL NS (premix)     Ordering Provider:  Ct Martinez RPH    1,250 mg Intravenous Every 12 Hours 01/31/17 2200      01/26/17 1328  piperacillin-tazobactam (ZOSYN) 4.5 g in dextrose 100 mL IVPB (premix)     Ordering Provider:  Sandra White MD    4.5 g Intravenous Every 8 Hours 01/26/17 1400      01/25/17 1213  cefTRIAXone (ROCEPHIN) IVPB 2 g     Ordering Provider:  LAUREN Hernández    2 g  over 30 Minutes Intravenous Once 01/25/17 1300 01/25/17 1331    01/25/17 1230  vancomycin 2000 mg/500 mL 0.9% NS IVPB ([facility])     Ordering Provider:  LAUREN Hernández    2,000 mg  over 120 Minutes Intravenous Once 01/25/17 1300 01/25/17 1555    01/25/17 1213  Pharmacy to dose vancomycin     Ordering Provider:  LAUREN Hernández     Does not apply Continuous PRN 01/25/17 1213            CC: Left foot infection    HPI:  Dr. White consulted on January 26, 2017, noted:  Patient is a 46 y.o. Yr old male with CC of severe left foot infection in the setting of untreated diabetes mellitus (a1c 14.9), extensive comorbidity and claudication. He has had a chronic plantar ulcer at the [facility] of unknown duration. About 1 week ago he noticed change in color of the dorsum of his foot He was seen at the [facility] ER and says that they \"took a piece of tissue off his foot\" and prescribed an antibiotic According to the chart he was prescribed bactrim by his pcp The patient's wife is not present and he has poor recall of his medical history.     1/30 surgery by Dr. Martinez    2/1/17 patient denies new pain and no new redness/swelling at the left leg.  He denies new intolerance to antibiotics.    ROS:      2/1/17 No f/c/s. No n/v/d. No rash. No new ADR to Abx.     PE:   Visit Vitals   • /99 (BP Location: Left arm, Patient Position: Lying)   • Pulse 81   • Temp 97.6 °F (36.4 " "°C) (Oral)   • Resp 18   • Ht 75\" (190.5 cm)   • Wt 244 lb (111 kg)   • SpO2 94%   • BMI 30.5 kg/m2       GENERAL: Awake and alert, in no acute distress.   HEENT:  No conjunctival injection. No icterus. Oropharynx clear without evidence of thrush or exudate.     HEART: RRR; No murmur, rubs, gallops.   LUNGS: Clear to auscultation bilaterally without wheezing, rales, rhonchi. Normal respiratory effort. Nonlabored. No dullness.  ABDOMEN: Soft, nontender, nondistended. Positive bowel sounds. No rebound or guarding. NO mass or HSM.  EXT:  No cyanosis, clubbing or edema. No cord.  : Genitalia generally unremarkable.  Without Walker catheter.  SKIN: Warm and dry without cutaneous eruptions on Inspection/palpation aside from below.      Right foot with plantar ulcer w/o associated erythema or soft tissue swelling  Skin with multiple old scars necrobiosis and old abcsesses    Left foot surgical site noted.  Vague erythema/edema and no obvious purulence.  No crepitus or bulla.  No discrete mass bulge or fluctuance.    Laboratory Data      Results from last 7 days  Lab Units 01/31/17  0417 01/30/17  0518 01/28/17  0614   WBC 10*3/mm3 12.24* 17.89* 15.56*   HEMOGLOBIN g/dL 11.6* 12.6* 12.4*   HEMATOCRIT % 34.6* 37.4* 36.0*   PLATELETS 10*3/mm3 220 222 188       Results from last 7 days  Lab Units 01/31/17  0417   SODIUM mmol/L 131*   POTASSIUM mmol/L 3.8   CHLORIDE mmol/L 95*   TOTAL CO2 mmol/L 28.0   BUN mg/dL 11   CREATININE mg/dL 0.60   GLUCOSE mg/dL 560*   CALCIUM mg/dL 9.0       Results from last 7 days  Lab Units 01/26/17  0702   ALK PHOS U/L 125*   BILIRUBIN mg/dL 0.4   ALT (SGPT) U/L 18   AST (SGOT) U/L 16               Estimated Creatinine Clearance: 202.5 mL/min (by C-G formula based on Cr of 0.6).      Microbiology:      Radiology:  Imaging Results (last 72 hours)     Procedure Component Value Units Date/Time    XR Chest PA & Lateral [[id number]] Collected:  01/27/17 1603     Updated:  01/30/17 0903    Narrative:    "    EXAMINATION: XR CHEST, PA AND LATERAL-01/27/2017:      INDICATION: Rule out pneumonia; L03.116-Cellulitis of left lower limb.      COMPARISON: 05/02/2008.     FINDINGS: The heart size is normal. There is no pneumothorax or  effusion. An area of increased attenuation is present in the left  costophrenic angle. The osseous structures of the chest are normal.           Impression:       Atelectasis versus infiltrate of left lower lobe/left  costophrenic angle.     D:  01/27/2017  E:  01/27/2017     This report was finalized on 1/30/2017 9:01 AM by Dr. Armando Morejon MD.       CT Lower Extremity Left With Contrast [[id number]] Collected:  01/25/17 1431     Updated:  01/30/17 1156    Narrative:       EXAMINATION:  CT SCAN OF THE LEFT FOOT WITH CONTRAST 01/25/2017     HISTORY: Left foot ulcer, cellulitis, history of diabetes     TECHNICAL: Exam consists of spiral acquisition 2 mm axial images through  the left foot with sagittal and coronal reconstructions at 2 mm.     The radiation dose reduction device was turned on for each scan per the  ALARA (As Low as Reasonably Achievable) protocol.      COMPARISON: None     FINDINGS: The patient's known foot ulcer is seen as air density in the  superficial soft tissues of the pad of the forefoot, between the fourth  and fifth MTP joints. Air is confined to the superficial 2 or 3 mm of  the ulceration and no deep soft tissue gas is appreciated. There is  focal dense edema over a roughly 3 cm area, and a roughly 15 mm area of  subcutaneous phlegmon, without evidence of discrete drainable abscess.  The phlegmonous area is contiguous with the soft tissue thickening  around the fifth MTP joint. The ulceration shows inflammation almost  contiguous with soft tissue thickening around the fourth MTP joint. No  bony erosive changes are seen to suggest osteomyelitis. There is no  obvious fluid collection within the joints to suggest septic arthritis.  Bony structures generally appear intact  and normally mineralized. No  fracture, destructive bony lesion or periosteal reaction is seen.       Impression:       Plantar ulcer deep to the fourth MTP joint, and focal  plantar phlegmon deep to the fifth MTP joint, that may represent  impending ulcer formation. Marginal involvement of the fourth and fifth  MTP joints but no evidence of septic arthritis, osteomyelitis, or  drainable abscess.     D:  01/252017  E:  01/25/2017     This report was finalized on 1/30/2017 11:54 AM by DR. Marquise Stafford MD.               Impression:   1 Extensive left foot infection- no osteo per MRI but  deep infection and had surgery January 30 by Dr. Martinez.  Need to review operative note and discussed with him with respect to surgical findings.  Cultures with MRSA/strep and coag-negative staph.  Patient understands surgery/antibiotics are not a guarantee for cure.  He will have a ongoing risk for nonhealing/relapse and functional/limb loss with potential for higher level amputation.     2 DM a1c 14.9--you need to tightly control blood sugar to give best chance for healing     3 R/O PVD with history concerning for claudication     4 Stage 4 hepatic fibrosis And dx DUNLAP     5 Hx Lissa's Gangrene    PLAN:   --Vancomycin/Zosyn    --I discussed potential risks and benefits of the prescribed antibiotics that include, but are not limited to, solid organ toxicity, neuro/bala/vestibular toxicity, renal toxicity, CDiff, cytopenias, hypersensitivity,  etc.. Patient/Family voice understanding and agree to proceed.    --Discussed with microbiology    --Partial history per nursing staff    --Highly complex set of issues with high risk for further serious morbidity and other serious sequela    Usman Dong MD  2/1/2017

## 2017-02-01 NOTE — PROGRESS NOTES
Continued Stay Note  [facility]     Patient Name: Kendrick Crytsal  MRN: [id number]  Today's Date: 2/1/2017    Admit Date: 1/25/2017          Discharge Plan       02/01/17 1117    Case Management/Social Work Plan    Plan LTACH    Patient/Family In Agreement With Plan yes    Additional Comments Spoke with Magalie at [facility], Belen at [facility], and [place] at [facility].  All facilities have rec'd the referral and are reviewing info.  Will require insurance pre-cert.  A bed is likely not available until later this week or early next week.  Updated pt and family at bedside.  CM will cont to follow.              Discharge Codes     None        Expected Discharge Date and Time     Expected Discharge Date Expected Discharge Time    Feb 1, 2017             Christelle Cuevas

## 2017-02-01 NOTE — PLAN OF CARE
Problem: Patient Care Overview (Adult)  Goal: Plan of Care Review  Outcome: Ongoing (interventions implemented as appropriate)    02/01/17 0417   Coping/Psychosocial Response Interventions   Plan Of Care Reviewed With patient   Patient Care Overview   Progress improving         Problem: Skin Integrity Impairment, Risk/Actual (Adult)  Goal: Identify Related Risk Factors and Signs and Symptoms  Outcome: Ongoing (interventions implemented as appropriate)

## 2017-02-01 NOTE — PROGRESS NOTES
[facility] Medicine Services  INPATIENT PROGRESS NOTE    Date of Admission: 1/25/2017  Length of Stay: 7  Primary Care Physician: Juan Rios MD    Subjective     Chief Complaint:  Chills and nausea and low grade temps    HPI:  Recurrent hyperglycemia and this a.m., denies dietary noncompliance or snacking.  The patient denies chest pain, dyspnea, orthopnea or paroxysmal nocturnal dyspnea.  Denies fever, cough, abdominal pain, diarrhea or constipation.      Review Of Systems:   Review of Systems  Foot ulcer has been there a month  Has a podiatrist - dr brown  Wasn't caring for DM but just re establishign with Dr Mcdonald - as taking only metf at home      Objective      Temp:  [97.5 °F (36.4 °C)-97.8 °F (36.6 °C)] 97.7 °F (36.5 °C)  Heart Rate:  [72-78] 72  Resp:  [16-20] 16  BP: (137-159)/() 159/96  Physical Exam  Gen:  The patient is awake and alert.  Poor dentition.  No apparent distress.    Neuro: alert and oriented, clear speech, follows commands, no sensation in feet or lower legs  HEENT:  Pupils are equally reactive and responsive to light.  Extraocular muscles are intact.  No oral lesion or thrush.  No palpable cervical lymphadenopathies  Neck:  Supple, no LAD.    Heart RRR no murmur, rub, or gallop  Lungs nonlaobred ctta.  No wheezing, rales or rhonchi  Abd:  Soft, nontender, no rebound or guarding, pos BS  Extrem:  No c/c/e.  Left foot covered with occlusive dressing.  Weak peripheral pulses    Results Review:    I have reviewed the labs, radiology results and diagnostic studies.  1.  Elevated glucose of 400  2.  Sodium 132.  Electrolytes within normal range  3.  Elevated hemoglobin A1c at 13.5  4.  Leukocytosis at 21,000 admission improved to 15,000  5.  Urinalysis more than 1011/dL glucose.  3+ positive ketones.  1+ protein.  Microscopic examination unremarkable    Electrocardiogram personally reviewed and shows normal sinus rhythm.  Incomplete left bundle branch  block  Chest x-ray personally reviewed and shows evidence of left lower lobe atelectasis  Arterial Doppler results pending  CT scan with contrast results of left lower extremity reviewed.  Plantar ulcer deep to the fourth metatarsophalangeal joint with plantar phlegmon deep to the fifth MTP joint that may represent impending ulcer formation.  Marginal involvement of the fourth and fifth MTP joints but no evidence of septic arthritis, osteoarthritis or drainable abscess    Microbiology: Blood cultures no growth  MRI results of the left foot reviewed.  Edema signal along the plantar aspect of the fourth and fifth metatarsophalangeal joints without evidence of abscess.  No indication of osteomyelitis  I have reviewed the medications.    Assessment/Plan     Assessment/Problem List  Principal Problem:    Cellulitis of left foot  Active Problems:    Essential hypertension    Poorly controlled type 2 diabetes mellitus with peripheral neuropathy    Type 2 diabetes mellitus with foot ulcer, with long-term current use of insulin    DUNLAP (nonalcoholic steatohepatitis)    Hyponatremia    Neutrophilic leukocytosis      This is a 46-year-old morbidly obese  male with a past medical history significant for type 2 diabetes mellitus insulin-dependent poorly controlled with diabetic microvascular disease including retinopathy, neuropathy and possible nephropathy, poor medical compliance (patient has not been taking any medications for the past few months), hypertension, hyperlipidemia  No known history of coronary artery disease or peripheral vascular disease  Developed left plantar ulcer following callous removal, complicated by superinfection and failure of outpatient about a treatment      1.  Diabetic foot ulcer of the left plantar area overlying the fourth and fifth metatarsophalangeal joints with phlegmon in the setting of underlying peripheral vascular disease as evidenced by CARLI measurements on the left side which  was not calculated due to noncompressible vessels suggestive of disease involving the tibioperoneal arteries  Plan:  - Post operative day number 2 s/p 4/5th toe TM amputation  - Wound culture growing MRSA, group A strep and coag-negative staph  - On broad-spectrum empiric intravenous antibiotic treatment with Zosyn and vancomycin  - Further evaluation for possible revascularization amputation per CT surgery    2.  Type 2 diabetes mellitus, insulin-dependent, poorly controlled with diabetic microvascular disease including retinopathy, neuropathy and nephropathy.  Hemoglobin A1c on admission was 14.1.  He was taking Lantus 10 units per day  Plan:  Increase Levemir to 70 units daily on 2/1/2017  Continue lispro to 20  units subcutaneous 3 times a day with meals and continue sliding scale  Added atorvastatin  Add aspirin    3.  Hyperosmolar hyponatremia due to hyperglycemia leading to solvent drag.  No further treatment or evaluation is indicated other than correction of the hyperglycemia     4. History of essential hypertension.  Suboptimal control.  Increase lisinopril to 10 mg daily    5. History of DUNLAP (nonalcoholic steatohepatitis).  Stable.  No evidence of decompensated liver disease        Gilbert Smith MD   02/01/17   1:02 PM    Please note that portions of this note may have been completed with a voice recognition program. Efforts were made to edit the dictations, but occasionally words are mistranscribed.

## 2017-02-02 ENCOUNTER — APPOINTMENT (OUTPATIENT)
Dept: CT IMAGING | Facility: HOSPITAL | Age: 47
End: 2017-02-02

## 2017-02-02 LAB
ALBUMIN SERPL-MCNC: 3 G/DL (ref 3.2–4.8)
ALBUMIN/GLOB SERPL: 1 G/DL (ref 1.5–2.5)
ALP SERPL-CCNC: 162 U/L (ref 25–100)
ALT SERPL W P-5'-P-CCNC: 27 U/L (ref 7–40)
ANION GAP SERPL CALCULATED.3IONS-SCNC: 7 MMOL/L (ref 3–11)
AST SERPL-CCNC: 25 U/L (ref 0–33)
BACTERIA SPEC AEROBE CULT: ABNORMAL
BILIRUB SERPL-MCNC: 0.2 MG/DL (ref 0.3–1.2)
BUN BLD-MCNC: 17 MG/DL (ref 9–23)
BUN/CREAT SERPL: 28.3 (ref 7–25)
CALCIUM SPEC-SCNC: 8.5 MG/DL (ref 8.7–10.4)
CHLORIDE SERPL-SCNC: 98 MMOL/L (ref 99–109)
CO2 SERPL-SCNC: 29 MMOL/L (ref 20–31)
CREAT BLD-MCNC: 0.6 MG/DL (ref 0.6–1.3)
DEPRECATED RDW RBC AUTO: 41.3 FL (ref 37–54)
ERYTHROCYTE [DISTWIDTH] IN BLOOD BY AUTOMATED COUNT: 13.1 % (ref 11.3–14.5)
GFR SERPL CREATININE-BSD FRML MDRD: 144 ML/MIN/1.73
GLOBULIN UR ELPH-MCNC: 3.1 GM/DL
GLUCOSE BLD-MCNC: 338 MG/DL (ref 70–100)
GLUCOSE BLDC GLUCOMTR-MCNC: 139 MG/DL (ref 70–130)
GLUCOSE BLDC GLUCOMTR-MCNC: 156 MG/DL (ref 70–130)
GLUCOSE BLDC GLUCOMTR-MCNC: 165 MG/DL (ref 70–130)
GLUCOSE BLDC GLUCOMTR-MCNC: 211 MG/DL (ref 70–130)
GLUCOSE BLDC GLUCOMTR-MCNC: 285 MG/DL (ref 70–130)
GRAM STN SPEC: ABNORMAL
GRAM STN SPEC: ABNORMAL
HCT VFR BLD AUTO: 35.4 % (ref 38.9–50.9)
HGB BLD-MCNC: 11.7 G/DL (ref 13.1–17.5)
MCH RBC QN AUTO: 28.5 PG (ref 27–31)
MCHC RBC AUTO-ENTMCNC: 33.1 G/DL (ref 32–36)
MCV RBC AUTO: 86.1 FL (ref 80–99)
PLATELET # BLD AUTO: 209 10*3/MM3 (ref 150–450)
PMV BLD AUTO: 10.1 FL (ref 6–12)
POTASSIUM BLD-SCNC: 4.2 MMOL/L (ref 3.5–5.5)
PROT SERPL-MCNC: 6.1 G/DL (ref 5.7–8.2)
RBC # BLD AUTO: 4.11 10*6/MM3 (ref 4.2–5.76)
SODIUM BLD-SCNC: 134 MMOL/L (ref 132–146)
VANCOMYCIN TROUGH SERPL-MCNC: 11.4 MCG/ML (ref 10–20)
WBC NRBC COR # BLD: 9.49 10*3/MM3 (ref 3.5–10.8)

## 2017-02-02 PROCEDURE — 25010000002 VANCOMYCIN HCL IN NACL 1.5-0.9 GM/500ML-% SOLUTION

## 2017-02-02 PROCEDURE — 80202 ASSAY OF VANCOMYCIN: CPT

## 2017-02-02 PROCEDURE — 80053 COMPREHEN METABOLIC PANEL: CPT | Performed by: INTERNAL MEDICINE

## 2017-02-02 PROCEDURE — 25010000002 VANCOMYCIN HCL IN NACL 1.25-0.9 GM/250ML-% SOLUTION

## 2017-02-02 PROCEDURE — 99233 SBSQ HOSP IP/OBS HIGH 50: CPT | Performed by: INTERNAL MEDICINE

## 2017-02-02 PROCEDURE — 25010000002 HEPARIN (PORCINE) PER 1000 UNITS: Performed by: PHYSICIAN ASSISTANT

## 2017-02-02 PROCEDURE — 82962 GLUCOSE BLOOD TEST: CPT

## 2017-02-02 PROCEDURE — 0 IOPAMIDOL PER 1 ML: Performed by: INTERNAL MEDICINE

## 2017-02-02 PROCEDURE — 75635 CT ANGIO ABDOMINAL ARTERIES: CPT

## 2017-02-02 PROCEDURE — 63710000001 INSULIN DETEMIR PER 5 UNITS: Performed by: INTERNAL MEDICINE

## 2017-02-02 PROCEDURE — 25010000002 PIPERACILLIN SOD-TAZOBACTAM PER 1 G: Performed by: INTERNAL MEDICINE

## 2017-02-02 PROCEDURE — 85027 COMPLETE CBC AUTOMATED: CPT | Performed by: INTERNAL MEDICINE

## 2017-02-02 RX ORDER — VANCOMYCIN/0.9 % SOD CHLORIDE 1.5G/250ML
1500 PLASTIC BAG, INJECTION (ML) INTRAVENOUS EVERY 12 HOURS
Status: DISCONTINUED | OUTPATIENT
Start: 2017-02-02 | End: 2017-02-03 | Stop reason: HOSPADM

## 2017-02-02 RX ORDER — LISINOPRIL 20 MG/1
40 TABLET ORAL
Status: DISCONTINUED | OUTPATIENT
Start: 2017-02-03 | End: 2017-02-03 | Stop reason: HOSPADM

## 2017-02-02 RX ADMIN — INSULIN LISPRO 2 UNITS: 100 INJECTION, SOLUTION INTRAVENOUS; SUBCUTANEOUS at 18:13

## 2017-02-02 RX ADMIN — LISINOPRIL 30 MG: 10 TABLET ORAL at 09:31

## 2017-02-02 RX ADMIN — INSULIN LISPRO 6 UNITS: 100 INJECTION, SOLUTION INTRAVENOUS; SUBCUTANEOUS at 09:32

## 2017-02-02 RX ADMIN — HYDROCODONE BITARTRATE AND ACETAMINOPHEN 1 TABLET: 10; 325 TABLET ORAL at 07:41

## 2017-02-02 RX ADMIN — INSULIN DETEMIR 70 UNITS: 100 INJECTION, SOLUTION SUBCUTANEOUS at 09:31

## 2017-02-02 RX ADMIN — ASPIRIN 81 MG: 81 TABLET, COATED ORAL at 09:31

## 2017-02-02 RX ADMIN — TAZOBACTAM SODIUM AND PIPERACILLIN SODIUM 4.5 G: 500; 4 INJECTION, SOLUTION INTRAVENOUS at 22:12

## 2017-02-02 RX ADMIN — Medication 1 CAPSULE: at 09:31

## 2017-02-02 RX ADMIN — HYDROCODONE BITARTRATE AND ACETAMINOPHEN 1 TABLET: 10; 325 TABLET ORAL at 18:12

## 2017-02-02 RX ADMIN — IOPAMIDOL 125 ML: 755 INJECTION, SOLUTION INTRAVENOUS at 15:28

## 2017-02-02 RX ADMIN — GABAPENTIN 1200 MG: 300 CAPSULE ORAL at 09:31

## 2017-02-02 RX ADMIN — GABAPENTIN 1200 MG: 300 CAPSULE ORAL at 18:12

## 2017-02-02 RX ADMIN — GABAPENTIN 1200 MG: 300 CAPSULE ORAL at 20:30

## 2017-02-02 RX ADMIN — HEPARIN SODIUM 5000 UNITS: 5000 INJECTION, SOLUTION INTRAVENOUS; SUBCUTANEOUS at 09:32

## 2017-02-02 RX ADMIN — VANCOMYCIN HYDROCHLORIDE 1500 MG: 1 INJECTION, POWDER, LYOPHILIZED, FOR SOLUTION INTRAVENOUS at 22:12

## 2017-02-02 RX ADMIN — LISINOPRIL 10 MG: 10 TABLET ORAL at 07:40

## 2017-02-02 RX ADMIN — INSULIN LISPRO 4 UNITS: 100 INJECTION, SOLUTION INTRAVENOUS; SUBCUTANEOUS at 12:03

## 2017-02-02 RX ADMIN — VANCOMYCIN HYDROCHLORIDE 1250 MG: 1 INJECTION, POWDER, LYOPHILIZED, FOR SOLUTION INTRAVENOUS at 10:27

## 2017-02-02 RX ADMIN — TAZOBACTAM SODIUM AND PIPERACILLIN SODIUM 4.5 G: 500; 4 INJECTION, SOLUTION INTRAVENOUS at 06:40

## 2017-02-02 RX ADMIN — ATORVASTATIN CALCIUM 40 MG: 40 TABLET, FILM COATED ORAL at 20:30

## 2017-02-02 RX ADMIN — TAZOBACTAM SODIUM AND PIPERACILLIN SODIUM 4.5 G: 500; 4 INJECTION, SOLUTION INTRAVENOUS at 18:13

## 2017-02-02 RX ADMIN — HEPARIN SODIUM 5000 UNITS: 5000 INJECTION, SOLUTION INTRAVENOUS; SUBCUTANEOUS at 20:30

## 2017-02-02 RX ADMIN — FAMOTIDINE 20 MG: 20 TABLET ORAL at 18:12

## 2017-02-02 RX ADMIN — FAMOTIDINE 20 MG: 20 TABLET ORAL at 09:31

## 2017-02-02 NOTE — PROGRESS NOTES
CTS Progress Note      POD 3 s/p Left 3rd, 4th, 5th transmetatarsal amputation       LOS: 8 days     Subjective  No acute events overnight. Pain controlled.     Objective    Vital Signs  Temp:  [97.6 °F (36.4 °C)-97.7 °F (36.5 °C)] 97.7 °F (36.5 °C)  Heart Rate:  [73-81] 79  Resp:  [18] 18  BP: (145-195)/() 195/101    Physical Exam:   General Appearance: alert, appears stated age and cooperative   Lungs: clear to auscultation    Heart: regular rhythm & normal rate, normal S1, S2 and no murmur, no clau, no rub   Skin: Dressing clean and dry   Wound dry.  Minimal bleeding.  Non-blanching erythema on dorsum of foot.       Results     Results from last 7 days  Lab Units 01/31/17  0417   WBC 10*3/mm3 12.24*   HEMOGLOBIN g/dL 11.6*   HEMATOCRIT % 34.6*   PLATELETS 10*3/mm3 220       Results from last 7 days  Lab Units 01/31/17  0417   SODIUM mmol/L 131*   POTASSIUM mmol/L 3.8   CHLORIDE mmol/L 95*   TOTAL CO2 mmol/L 28.0   BUN mg/dL 11   CREATININE mg/dL 0.60   GLUCOSE mg/dL 560*   CALCIUM mg/dL 9.0       Assessment  POD 3 s/p Left 3rd, 4th, 5th transmetatarsal amputation, poor wound healing    Plan   Abx per ID  Elevate left foot  Non-weight bearing left foot  BID saline wet-to-dry dressing changes  CTA aorta with runoff to assess for PVD    LAUREN Pope  02/02/17  7:41 AM

## 2017-02-02 NOTE — NURSING NOTE
[facility] follow-up for left foot ulceration. PT wound care has removed wound vac and RN's doing BID wet to dry dressing changes per MD order. Please contact [facility] if any further needs arise. Thanks

## 2017-02-02 NOTE — PROGRESS NOTES
"[facility] Progress Note    1/25/2017      Antibiotics:  IV Anti-Infectives     Ordered     Dose/Rate Route Frequency Start Stop    01/31/17 1351  vancomycin IVPB 1250 mg in 250 mL NS (premix)     Ordering Provider:  Ct Martinez RPH    1,250 mg Intravenous Every 12 Hours 01/31/17 2200      01/26/17 1328  piperacillin-tazobactam (ZOSYN) 4.5 g in dextrose 100 mL IVPB (premix)     Ordering Provider:  Sandra White MD    4.5 g Intravenous Every 8 Hours 01/26/17 1400      01/25/17 1213  cefTRIAXone (ROCEPHIN) IVPB 2 g     Ordering Provider:  LAUREN Hernández    2 g  over 30 Minutes Intravenous Once 01/25/17 1300 01/25/17 1331    01/25/17 1230  vancomycin 2000 mg/500 mL 0.9% NS IVPB ([facility])     Ordering Provider:  LAUREN Hernández    2,000 mg  over 120 Minutes Intravenous Once 01/25/17 1300 01/25/17 1555    01/25/17 1213  Pharmacy to dose vancomycin     Ordering Provider:  LAUREN Hernández     Does not apply Continuous PRN 01/25/17 1213            CC: Left foot infection    HPI:  Dr. White consulted on January 26, 2017, noted:  Patient is a 46 y.o. Yr old male with CC of severe left foot infection in the setting of untreated diabetes mellitus (a1c 14.9), extensive comorbidity and claudication. He has had a chronic plantar ulcer at the [facility] of unknown duration. About 1 week ago he noticed change in color of the dorsum of his foot He was seen at the [facility] ER and says that they \"took a piece of tissue off his foot\" and prescribed an antibiotic According to the chart he was prescribed bactrim by his pcp The patient's wife is not present and he has poor recall of his medical history.     1/30 surgery by Dr. Martinez    2/2/17 patient denies new pain and no new redness/swelling at the left leg.  He denies new intolerance to antibiotics.    ROS:      2/2/17 No f/c/s. No n/v/d. No rash. No new ADR to Abx.     PE:   Visit Vitals   • BP (!) 213/110   • Pulse 79   • Temp 97.7 °F (36.5 °C) (Oral)   • Resp 18   • Ht 75\" (190.5 cm) "   • Wt 244 lb (111 kg)   • SpO2 94%   • BMI 30.5 kg/m2       GENERAL: Awake and alert, in no acute distress.   HEENT:  No conjunctival injection. No icterus. Oropharynx clear without evidence of thrush or exudate.     HEART: RRR; No murmur, rubs, gallops.   LUNGS: Clear to auscultation bilaterally without wheezing, rales, rhonchi. Normal respiratory effort. Nonlabored. No dullness.  ABDOMEN: Soft, nontender, nondistended. Positive bowel sounds. No rebound or guarding. NO mass or HSM.  EXT:  No cyanosis, clubbing or edema. No cord.  : Genitalia generally unremarkable.  Without Walker catheter.  SKIN: Warm and dry without cutaneous eruptions on Inspection/palpation aside from below.      Right foot with plantar ulcer w/o associated erythema or soft tissue swelling  Skin with multiple old scars necrobiosis and old abcsesses    Left foot surgical site noted.  Vague erythema/edema and no obvious purulence.  No crepitus or bulla.  No discrete mass bulge or fluctuance.    Laboratory Data      Results from last 7 days  Lab Units 02/02/17  0947 01/31/17  0417 01/30/17  0518   WBC 10*3/mm3 9.49 12.24* 17.89*   HEMOGLOBIN g/dL 11.7* 11.6* 12.6*   HEMATOCRIT % 35.4* 34.6* 37.4*   PLATELETS 10*3/mm3 209 220 222       Results from last 7 days  Lab Units 01/31/17  0417   SODIUM mmol/L 131*   POTASSIUM mmol/L 3.8   CHLORIDE mmol/L 95*   TOTAL CO2 mmol/L 28.0   BUN mg/dL 11   CREATININE mg/dL 0.60   GLUCOSE mg/dL 560*   CALCIUM mg/dL 9.0                   Estimated Creatinine Clearance: 202.5 mL/min (by C-G formula based on Cr of 0.6).      Microbiology:      Radiology:  Imaging Results (last 72 hours)     Procedure Component Value Units Date/Time    XR Chest PA & Lateral [[id number]] Collected:  01/27/17 1603     Updated:  01/30/17 0903    Narrative:       EXAMINATION: XR CHEST, PA AND LATERAL-01/27/2017:      INDICATION: Rule out pneumonia; L03.116-Cellulitis of left lower limb.      COMPARISON: 05/02/2008.     FINDINGS: The heart  size is normal. There is no pneumothorax or  effusion. An area of increased attenuation is present in the left  costophrenic angle. The osseous structures of the chest are normal.           Impression:       Atelectasis versus infiltrate of left lower lobe/left  costophrenic angle.     D:  01/27/2017  E:  01/27/2017     This report was finalized on 1/30/2017 9:01 AM by Dr. Armando Morejon MD.       CT Lower Extremity Left With Contrast [[id number]] Collected:  01/25/17 1431     Updated:  01/30/17 1156    Narrative:       EXAMINATION:  CT SCAN OF THE LEFT FOOT WITH CONTRAST 01/25/2017     HISTORY: Left foot ulcer, cellulitis, history of diabetes     TECHNICAL: Exam consists of spiral acquisition 2 mm axial images through  the left foot with sagittal and coronal reconstructions at 2 mm.     The radiation dose reduction device was turned on for each scan per the  ALARA (As Low as Reasonably Achievable) protocol.      COMPARISON: None     FINDINGS: The patient's known foot ulcer is seen as air density in the  superficial soft tissues of the pad of the forefoot, between the fourth  and fifth MTP joints. Air is confined to the superficial 2 or 3 mm of  the ulceration and no deep soft tissue gas is appreciated. There is  focal dense edema over a roughly 3 cm area, and a roughly 15 mm area of  subcutaneous phlegmon, without evidence of discrete drainable abscess.  The phlegmonous area is contiguous with the soft tissue thickening  around the fifth MTP joint. The ulceration shows inflammation almost  contiguous with soft tissue thickening around the fourth MTP joint. No  bony erosive changes are seen to suggest osteomyelitis. There is no  obvious fluid collection within the joints to suggest septic arthritis.  Bony structures generally appear intact and normally mineralized. No  fracture, destructive bony lesion or periosteal reaction is seen.       Impression:       Plantar ulcer deep to the fourth MTP joint, and focal  plantar  phlegmon deep to the fifth MTP joint, that may represent  impending ulcer formation. Marginal involvement of the fourth and fifth  MTP joints but no evidence of septic arthritis, osteomyelitis, or  drainable abscess.     D:  01/252017  E:  01/25/2017     This report was finalized on 1/30/2017 11:54 AM by DR. Marquise Stafford MD.               Impression:   1 Extensive left foot infection- no osteo per MRI but  deep infection and had surgery January 30 by Dr. Martinez with metatarsal amputation at numbers 3/4/5.  Marginal perfusion per operative note.  Need to review operative note and discussed with him with respect to surgical findings.  Cultures with MRSA/strep and coag-negative staph.  Patient understands surgery/antibiotics are not a guarantee for cure.  He will have a ongoing risk for nonhealing/relapse and functional/limb loss with potential for higher level amputation.     2 DM a1c 14.9--you need to tightly control blood sugar to give best chance for healing     3 R/O PVD with history concerning for claudication     4 Stage 4 hepatic fibrosis And dx DUNLAP     5 Hx Lissa's Gangrene    PLAN:   --Vancomycin/Zosyn    --I discussed potential risks and benefits of the prescribed antibiotics that include, but are not limited to, solid organ toxicity, neuro/bala/vestibular toxicity, renal toxicity, CDiff, cytopenias, hypersensitivity,  etc.. Patient/Family voice understanding and agree to proceed.    --Discussed with microbiology    --Partial history per nursing staff    --Highly complex set of issues with high risk for further serious morbidity and other serious sequela    --I discussed with Dr. Martinez 2/2; he will pursue further vascular workup and he will consider revascularization options  Depending on findings    Usman Dong MD  2/2/2017

## 2017-02-02 NOTE — PLAN OF CARE
Problem: Patient Care Overview (Adult)  Goal: Plan of Care Review  Outcome: Ongoing (interventions implemented as appropriate)    02/02/17 0608   Coping/Psychosocial Response Interventions   Plan Of Care Reviewed With patient   Patient Care Overview   Progress progress toward functional goals as expected         Problem: Skin Integrity Impairment, Risk/Actual (Adult)  Goal: Identify Related Risk Factors and Signs and Symptoms  Outcome: Ongoing (interventions implemented as appropriate)

## 2017-02-02 NOTE — PROGRESS NOTES
Pharmacokinetic follow up    Subjective  Patient is stable from ID standpoint, no new events since yesterday.  Day 8 of vancomycin and piperacillin/tazobactam therapy for deep tissue infection of left foot, s/p amputation of toes 3, 4 and 5 on 1/30/17..      Objective  Vital Signs (last 24 hours)         02/01 0700  -  02/02 0659 02/02 0700  -  02/02 1302   Most Recent      Temp (°F) 97.6 -  97.7      97.8     97.8 (36.6)    Heart Rate 73 -  81      79     79    Resp   18      18     18    /88 -  (!) 172/108    180/99 -  (!) 213/110     180/99       02/01 0701 - 02/02 0700  In: 480 [P.O.:480]  Out: 800 [Urine:800]      Results from last 7 days     Lab Units 01/31/17  0417 01/30/17  0518 01/28/17  0614   CREATININE mg/dL 0.60 0.60 0.60      WOUND CULTURE   Date Value Ref Range Status   01/28/2017 Light growth (2+) Staphylococcus auricularis (A)  Final     Lab Results   Component Value Date    [facility] 11.40 02/02/2017     Assessment/Plan:  Trough is below goal range of 15-20 mcg/ml.  Will increase dose to 1500 mg IV q12h which is expected to produce a trough value of approximately 14.  Plan to recheck trough 72 hours after dose change to verify dose is producing desired serum level.  Pharmacy Service will continue to check on the patient daily to monitor clinical progress and for evidence of vancomycin toxicity.    Thank you,  Meggan Phoenix, PharmD, BCPS

## 2017-02-02 NOTE — PROGRESS NOTES
[facility] Medicine Services  INPATIENT PROGRESS NOTE    Date of Admission: 1/25/2017  Length of Stay: 8  Primary Care Physician: Juan Rios MD    Subjective     Chief Complaint:  Chills and nausea and low grade temps    HPI:  No new issues overnight, sitting in bed watching television.     Review Of Systems:   General- no F/C  Pulm- no SOA, no cough  CVS- no chest pain, no palpitations      Objective      Temp:  [97.6 °F (36.4 °C)-97.8 °F (36.6 °C)] 97.8 °F (36.6 °C)  Heart Rate:  [73-81] 79  Resp:  [18] 18  BP: (145-213)/() 180/99  Physical Exam  Gen:  The patient is awake and alert.  Poor dentition.  No apparent distress.    Neuro: alert and oriented, clear speech, follows commands, no sensation in feet or lower legs  HEENT:  Pupils are equally reactive and responsive to light.  Extraocular muscles are intact.  No oral lesion or thrush.  No palpable cervical lymphadenopathies  Neck:  Supple, no LAD.    Heart RRR no murmur, rub, or gallop  Lungs nonlaobred ctta.  No wheezing, rales or rhonchi  Abd:  Soft, nontender, no rebound or guarding, pos BS  Extrem:  No c/c/e.  Left foot covered with occlusive dressing.  Weak peripheral pulses    Results Review:    I have reviewed the labs, radiology results and diagnostic studies.  1.  Elevated glucose of 400  2.  Sodium 132.  Electrolytes within normal range  3.  Elevated hemoglobin A1c at 13.5  4.  Leukocytosis at 21,000 admission improved to 15,000  5.  Urinalysis more than 1011/dL glucose.  3+ positive ketones.  1+ protein.  Microscopic examination unremarkable    Electrocardiogram personally reviewed and shows normal sinus rhythm.  Incomplete left bundle branch block  Chest x-ray personally reviewed and shows evidence of left lower lobe atelectasis  Arterial Doppler results pending  CT scan with contrast results of left lower extremity reviewed.  Plantar ulcer deep to the fourth metatarsophalangeal joint with plantar phlegmon deep  to the fifth MTP joint that may represent impending ulcer formation.  Marginal involvement of the fourth and fifth MTP joints but no evidence of septic arthritis, osteoarthritis or drainable abscess    Microbiology: Blood cultures no growth  MRI results of the left foot reviewed.  Edema signal along the plantar aspect of the fourth and fifth metatarsophalangeal joints without evidence of abscess.  No indication of osteomyelitis  I have reviewed the medications.    Assessment/Plan     Assessment/Problem List  Principal Problem:    Cellulitis of left foot  Active Problems:    Essential hypertension    Poorly controlled type 2 diabetes mellitus with peripheral neuropathy    Type 2 diabetes mellitus with foot ulcer, with long-term current use of insulin    DUNLAP (nonalcoholic steatohepatitis)    Hyponatremia    Neutrophilic leukocytosis      This is a 46-year-old morbidly obese  male with a past medical history significant for type 2 diabetes mellitus insulin-dependent poorly controlled with diabetic microvascular disease including retinopathy, neuropathy and possible nephropathy, poor medical compliance (patient has not been taking any medications for the past few months), hypertension, hyperlipidemia  No known history of coronary artery disease or peripheral vascular disease  Developed left plantar ulcer following callous removal, complicated by superinfection and failure of outpatient about a treatment      1.  Diabetic foot ulcer of the left plantar area overlying the fourth and fifth metatarsophalangeal joints with phlegmon in the setting of underlying peripheral vascular disease as evidenced by CARLI measurements on the left side which was not calculated due to noncompressible vessels suggestive of disease involving the tibioperoneal arteries  Plan:  - Post operative day number 3 s/p 4/5th toe TM amputation  - Wound culture growing MRSA, group A strep and coag-negative staph  - On broad-spectrum empiric  intravenous antibiotic treatment with Zosyn and vancomycin  - Further evaluation for possible revascularization amputation per CT surgery    2.  Type 2 diabetes mellitus, insulin-dependent, poorly controlled with diabetic microvascular disease including retinopathy, neuropathy and nephropathy.  Hemoglobin A1c on admission was 14.1.  He was taking Lantus 10 units per day  Plan:  Increased Levemir to 70 units daily on 2/1/2017  Continue lispro to 20  units subcutaneous 3 times a day with meals and continue sliding scale  Added atorvastatin and ASA    3.  Hyperosmolar hyponatremia due to hyperglycemia leading to solvent drag.  No further treatment or evaluation is indicated other than correction of the hyperglycemia     4. History of essential hypertension.  Suboptimal control.  Increased lisinopril to 40 mg daily    5. History of DUNLAP (nonalcoholic steatohepatitis).  Stable.  No evidence of decompensated liver disease        Latha Lenz MD   02/02/17   11:55 AM

## 2017-02-03 VITALS
TEMPERATURE: 98.1 F | HEIGHT: 75 IN | SYSTOLIC BLOOD PRESSURE: 146 MMHG | WEIGHT: 262.8 LBS | BODY MASS INDEX: 32.67 KG/M2 | HEART RATE: 91 BPM | RESPIRATION RATE: 18 BRPM | DIASTOLIC BLOOD PRESSURE: 75 MMHG | OXYGEN SATURATION: 94 %

## 2017-02-03 LAB
ALBUMIN SERPL-MCNC: 3.3 G/DL (ref 3.2–4.8)
ALBUMIN/GLOB SERPL: 1 G/DL (ref 1.5–2.5)
ALP SERPL-CCNC: 166 U/L (ref 25–100)
ALT SERPL W P-5'-P-CCNC: 20 U/L (ref 7–40)
ANION GAP SERPL CALCULATED.3IONS-SCNC: 4 MMOL/L (ref 3–11)
AST SERPL-CCNC: 17 U/L (ref 0–33)
BILIRUB SERPL-MCNC: 0.3 MG/DL (ref 0.3–1.2)
BUN BLD-MCNC: 16 MG/DL (ref 9–23)
BUN/CREAT SERPL: 26.7 (ref 7–25)
CALCIUM SPEC-SCNC: 9.1 MG/DL (ref 8.7–10.4)
CHLORIDE SERPL-SCNC: 97 MMOL/L (ref 99–109)
CO2 SERPL-SCNC: 34 MMOL/L (ref 20–31)
CREAT BLD-MCNC: 0.6 MG/DL (ref 0.6–1.3)
DEPRECATED RDW RBC AUTO: 41.3 FL (ref 37–54)
ERYTHROCYTE [DISTWIDTH] IN BLOOD BY AUTOMATED COUNT: 13.2 % (ref 11.3–14.5)
GFR SERPL CREATININE-BSD FRML MDRD: 144 ML/MIN/1.73
GLOBULIN UR ELPH-MCNC: 3.2 GM/DL
GLUCOSE BLD-MCNC: 230 MG/DL (ref 70–100)
GLUCOSE BLDC GLUCOMTR-MCNC: 122 MG/DL (ref 70–130)
GLUCOSE BLDC GLUCOMTR-MCNC: 162 MG/DL (ref 70–130)
GLUCOSE BLDC GLUCOMTR-MCNC: 258 MG/DL (ref 70–130)
GLUCOSE BLDC GLUCOMTR-MCNC: 79 MG/DL (ref 70–130)
HCT VFR BLD AUTO: 36.3 % (ref 38.9–50.9)
HGB BLD-MCNC: 11.8 G/DL (ref 13.1–17.5)
MCH RBC QN AUTO: 28 PG (ref 27–31)
MCHC RBC AUTO-ENTMCNC: 32.5 G/DL (ref 32–36)
MCV RBC AUTO: 86 FL (ref 80–99)
PLATELET # BLD AUTO: 238 10*3/MM3 (ref 150–450)
PMV BLD AUTO: 10.8 FL (ref 6–12)
POTASSIUM BLD-SCNC: 4 MMOL/L (ref 3.5–5.5)
PROT SERPL-MCNC: 6.5 G/DL (ref 5.7–8.2)
RBC # BLD AUTO: 4.22 10*6/MM3 (ref 4.2–5.76)
SODIUM BLD-SCNC: 135 MMOL/L (ref 132–146)
WBC NRBC COR # BLD: 10.83 10*3/MM3 (ref 3.5–10.8)

## 2017-02-03 PROCEDURE — 80053 COMPREHEN METABOLIC PANEL: CPT | Performed by: INTERNAL MEDICINE

## 2017-02-03 PROCEDURE — 63710000001 INSULIN DETEMIR PER 5 UNITS: Performed by: INTERNAL MEDICINE

## 2017-02-03 PROCEDURE — 99233 SBSQ HOSP IP/OBS HIGH 50: CPT | Performed by: INTERNAL MEDICINE

## 2017-02-03 PROCEDURE — 99239 HOSP IP/OBS DSCHRG MGMT >30: CPT | Performed by: NURSE PRACTITIONER

## 2017-02-03 PROCEDURE — 82962 GLUCOSE BLOOD TEST: CPT

## 2017-02-03 PROCEDURE — 25010000002 PIPERACILLIN SOD-TAZOBACTAM PER 1 G: Performed by: INTERNAL MEDICINE

## 2017-02-03 PROCEDURE — 25010000002 HYDRALAZINE PER 20 MG: Performed by: NURSE PRACTITIONER

## 2017-02-03 PROCEDURE — 85027 COMPLETE CBC AUTOMATED: CPT | Performed by: INTERNAL MEDICINE

## 2017-02-03 PROCEDURE — 25010000002 HEPARIN (PORCINE) PER 1000 UNITS: Performed by: PHYSICIAN ASSISTANT

## 2017-02-03 PROCEDURE — 25010000002 VANCOMYCIN HCL IN NACL 1.5-0.9 GM/500ML-% SOLUTION

## 2017-02-03 RX ORDER — ACETAMINOPHEN 325 MG/1
650 TABLET ORAL EVERY 6 HOURS PRN
Refills: 0
Start: 2017-02-03 | End: 2017-02-17 | Stop reason: HOSPADM

## 2017-02-03 RX ORDER — HYDRALAZINE HYDROCHLORIDE 20 MG/ML
20 INJECTION INTRAMUSCULAR; INTRAVENOUS ONCE
Status: COMPLETED | OUTPATIENT
Start: 2017-02-03 | End: 2017-02-03

## 2017-02-03 RX ORDER — LISINOPRIL 40 MG/1
40 TABLET ORAL
Start: 2017-02-03 | End: 2017-02-17 | Stop reason: HOSPADM

## 2017-02-03 RX ORDER — VANCOMYCIN/0.9 % SOD CHLORIDE 1.5G/250ML
1500 PLASTIC BAG, INJECTION (ML) INTRAVENOUS EVERY 12 HOURS
Qty: 10000 ML
Start: 2017-02-03 | End: 2017-02-17 | Stop reason: HOSPADM

## 2017-02-03 RX ORDER — GABAPENTIN 400 MG/1
1200 CAPSULE ORAL 3 TIMES DAILY
Start: 2017-02-03 | End: 2019-08-14 | Stop reason: HOSPADM

## 2017-02-03 RX ORDER — ATORVASTATIN CALCIUM 40 MG/1
40 TABLET, FILM COATED ORAL NIGHTLY
Start: 2017-02-03 | End: 2017-03-07 | Stop reason: HOSPADM

## 2017-02-03 RX ORDER — FAMOTIDINE 20 MG/1
20 TABLET, FILM COATED ORAL 2 TIMES DAILY
Start: 2017-02-03 | End: 2017-04-25

## 2017-02-03 RX ORDER — BENZONATATE 100 MG/1
100 CAPSULE ORAL 3 TIMES DAILY PRN
Refills: 0
Start: 2017-02-03 | End: 2017-02-17 | Stop reason: HOSPADM

## 2017-02-03 RX ORDER — ASPIRIN 81 MG/1
81 TABLET ORAL DAILY
Status: ON HOLD
Start: 2017-02-03 | End: 2018-08-08 | Stop reason: ALTCHOICE

## 2017-02-03 RX ORDER — POVIDONE-IODINE 10 MG/G
OINTMENT TOPICAL DAILY
Refills: 0
Start: 2017-02-03 | End: 2017-03-07 | Stop reason: HOSPADM

## 2017-02-03 RX ORDER — LACTOBACILLUS RHAMNOSUS GG 10B CELL
1 CAPSULE ORAL DAILY
Qty: 30 CAPSULE | Status: ON HOLD
Start: 2017-02-03 | End: 2018-08-08

## 2017-02-03 RX ORDER — HYDROCODONE BITARTRATE AND ACETAMINOPHEN 10; 325 MG/1; MG/1
1 TABLET ORAL EVERY 6 HOURS PRN
Refills: 0
Start: 2017-02-03 | End: 2017-02-04

## 2017-02-03 RX ADMIN — Medication 1 CAPSULE: at 08:32

## 2017-02-03 RX ADMIN — TAZOBACTAM SODIUM AND PIPERACILLIN SODIUM 4.5 G: 500; 4 INJECTION, SOLUTION INTRAVENOUS at 06:29

## 2017-02-03 RX ADMIN — INSULIN LISPRO 2 UNITS: 100 INJECTION, SOLUTION INTRAVENOUS; SUBCUTANEOUS at 13:21

## 2017-02-03 RX ADMIN — HEPARIN SODIUM 5000 UNITS: 5000 INJECTION, SOLUTION INTRAVENOUS; SUBCUTANEOUS at 08:33

## 2017-02-03 RX ADMIN — HYDROCODONE BITARTRATE AND ACETAMINOPHEN 1 TABLET: 10; 325 TABLET ORAL at 08:32

## 2017-02-03 RX ADMIN — VANCOMYCIN HYDROCHLORIDE 1500 MG: 1 INJECTION, POWDER, LYOPHILIZED, FOR SOLUTION INTRAVENOUS at 10:30

## 2017-02-03 RX ADMIN — GABAPENTIN 1200 MG: 300 CAPSULE ORAL at 08:32

## 2017-02-03 RX ADMIN — LISINOPRIL 40 MG: 20 TABLET ORAL at 08:32

## 2017-02-03 RX ADMIN — FAMOTIDINE 20 MG: 20 TABLET ORAL at 08:32

## 2017-02-03 RX ADMIN — INSULIN DETEMIR 70 UNITS: 100 INJECTION, SOLUTION SUBCUTANEOUS at 08:33

## 2017-02-03 RX ADMIN — ASPIRIN 81 MG: 81 TABLET, COATED ORAL at 08:32

## 2017-02-03 RX ADMIN — INSULIN LISPRO 6 UNITS: 100 INJECTION, SOLUTION INTRAVENOUS; SUBCUTANEOUS at 08:45

## 2017-02-03 RX ADMIN — HYDRALAZINE HYDROCHLORIDE 20 MG: 20 INJECTION INTRAMUSCULAR; INTRAVENOUS at 06:38

## 2017-02-03 NOTE — PROGRESS NOTES
CTS Progress Note      POD 4 s/p Left 3rd, 4th, 5th transmetatarsal amputation       LOS: 9 days     Subjective  No acute events overnight. Pain controlled.     Objective    Vital Signs  Temp:  [97.8 °F (36.6 °C)-98.2 °F (36.8 °C)] 98 °F (36.7 °C)  Heart Rate:  [] 88  Resp:  [18] 18  BP: (154-213)/() 157/88    Physical Exam:   General Appearance: alert, appears stated age and cooperative   Lungs: clear to auscultation    Heart: regular rhythm & normal rate, normal S1, S2 and no murmur, no clau, no rub   Skin: Dressing clean and dry   Wound dry.  Minimal bleeding.  Non-blanching erythema on dorsum of foot.       Results     Results from last 7 days  Lab Units 02/03/17  0609   WBC 10*3/mm3 10.83*   HEMOGLOBIN g/dL 11.8*   HEMATOCRIT % 36.3*   PLATELETS 10*3/mm3 238       Results from last 7 days  Lab Units 02/03/17  0609   SODIUM mmol/L 135   POTASSIUM mmol/L 4.0   CHLORIDE mmol/L 97*   TOTAL CO2 mmol/L 34.0*   BUN mg/dL 16   CREATININE mg/dL 0.60   GLUCOSE mg/dL 230*   CALCIUM mg/dL 9.1       Assessment  POD 4 s/p Left 3rd, 4th, 5th transmetatarsal amputation, poor wound healing    Plan   Abx per ID  Elevate left foot  Non-weight bearing left foot  BID saline wet-to-dry dressing changes  Wound vac to left foot

## 2017-02-03 NOTE — PROGRESS NOTES
Continued Stay Note  [facility]     Patient Name: Kendrick Crystal  MRN: [id number]  Today's Date: 2/3/2017    Admit Date: 1/25/2017          Discharge Plan       02/03/17 1248    Case Management/Social Work Plan    Additional Comments -    Final Note    Final Note [facility]. Nurse to call report to [phone], discharge summary to be faxed to [phone].               Discharge Codes     None        Expected Discharge Date and Time     Expected Discharge Date Expected Discharge Time    Feb 1, 2017             Sima Joshua

## 2017-02-03 NOTE — PAYOR COMM NOTE
"Elliot Crystal (46 y.o. Male) NOTIFICATION OF DISCHARGE FOR DINORA SY 68, discharged 2/3/17, auth [id number]    Date of Birth Social Security Number Address Home Phone MRN    1970  [address] [place] [phone] [id number]    Denominational Marital Status          Religious        Admission Date Admission Type Admitting Provider Attending Provider Department, Room/Bed    17 Emergency Latha Lenz MD  [address], S461/1    Discharge Date Discharge Disposition Discharge Destination        2/3/2017 Short Term Hospital ([place] - [facility])             Attending Provider: (none)    Allergies:  No Known Drug Allergy    Isolation:  None   Infection:  None   Code Status:  FULL    Ht:  75\" (190.5 cm)   Wt:  262 lb 12.8 oz (119 kg)    Admission Cmt:  None   Principal Problem:  Cellulitis of left foot [L03.116]                 Active Insurance as of 2017     Primary Coverage     Payor Plan Insurance Group Employer/Plan Group    HUMANA MEDICAID HU[employer]NA [employer] [id number]     Payor Plan Address Payor Plan Phone Number Effective From Effective To    PO  [phone] 2017     [place] [address]       Subscriber Name Subscriber Birth Date Member ID       ELLIOT CRYSTAL 1970 [id number]                 Emergency Contacts      (Rel.) Home Phone Work Phone Mobile Phone    Cindy Crystal (Spouse) [phone] -- [phone]            Insurance Information                HU[employer]NA MEDICAID/[employer]NA [employer] Phone: [phone]    Subscriber: Elliot Crystal Subscriber#: [id number]    Group#: [id number] Precert#:              Discharge Summary      CHINTAN Sebastian at 2/3/2017  1:14 PM              [facility] Medicine Services  DISCHARGE SUMMARY       Date of Admission: 2017  Date of Discharge:  2/3/2017  Primary Care Physician: Juan Rios MD    Discharge Diagnoses:  Active Hospital Problems (** Indicates " Principal Problem)    Diagnosis Date Noted   • **Cellulitis of left foot [L03.116] 01/25/2017   • Poorly controlled type 2 diabetes mellitus with peripheral neuropathy [E11.42, E11.65] 01/25/2017   • Type 2 diabetes mellitus with foot ulcer, with long-term current use of insulin [E11.621, L97.509, Z79.4] 01/25/2017   • DUNLAP (nonalcoholic steatohepatitis) [K75.81] 01/25/2017   • Hyponatremia [E87.1] 01/25/2017   • Neutrophilic leukocytosis [D72.9] 01/25/2017   • Essential hypertension [I10] 01/17/2017      Resolved Hospital Problems    Diagnosis Date Noted Date Resolved   No resolved problems to display.       Presenting Problem/History of Present Illness  Cellulitis of left foot [L03.116]     Chief Complaint: Left foot ulceration     History of Present Illness on Day of Discharge: Laying in bed, wife at bedside. No new complaints. No chest pain, dyspnea, abdominal pain, n/v/d/c, fever or chills. Per MD Lenz he is now medically ready for transfer and Mr. Crystal and his wife are agreeable to transfer to [facility] today.     Hospital Course  Mr. Crystal is a 46 year old male with a history of DUNLAP s/p liver transplant 10/3/14, type II diabetes mellitus, hypertension, multiple previous skin abscesses and neuropathy who presented to [facility] ED on 1/25/17 with worsening erythema, edema and drainage of a left foot ulceration. The ulceration was diagnosed 1 month ago. Culture was positive for MRSA and he was treated with a 7 day course of bactrim. CT left lower extremity showed deep plantar ulcer to the fourth MTP joint, and focal plantar phlegmon deep to the fifth MTP joint. No osteo was per MRI.   He was followed by Dr. Dong, infectious disease. He was treated with IV Vancomycin and Zosyn which he will continue at [facility].   Due to worsening cellulitis and extensive plantar ulceration Dr. Martinez, CT surgery, was consulted. Mr. Crystal did undergo a left 5th, 4th, and 3rd transmetatarsal amputation. He is currently receiving  "wet-to-dry dressing changes BID. Per Dr. Martinez's request he would like a wound vac placed on arrival to [facility] A CT run off was performed and results are pending at this time. He will need to follow up with Dr. Martinez after discharge from [facility] for further evaluation for possible revascularization amputation.   Of note his admission HgbA1c was 14.9. His insulin regimen has been adjusted for better glucose control. Per the patient his glucose at home has been in the 400s recently. He will need to follow up with PCP in 1 week of discharge from [facility]      Procedures Performed  Procedure(s):  left fourth and fifth transmetatarsal toe amputation        Consults:   Consults     Date and Time Order Name Status Description    1/26/2017 1259 Inpatient Consult to Vascular Surgery Completed     1/25/2017 1717 Inpatient Consult to Infectious Diseases Completed           Pertinent Test Results:     Results from last 7 days  Lab Units 02/03/17  0609 02/02/17  0947 01/31/17  0417   WBC 10*3/mm3 10.83* 9.49 12.24*   HEMOGLOBIN g/dL 11.8* 11.7* 11.6*   HEMATOCRIT % 36.3* 35.4* 34.6*   PLATELETS 10*3/mm3 238 209 220       Results from last 7 days  Lab Units 02/03/17  0609 02/02/17  0947 01/31/17  0417   SODIUM mmol/L 135 134 131*   POTASSIUM mmol/L 4.0 4.2 3.8   CHLORIDE mmol/L 97* 98* 95*   TOTAL CO2 mmol/L 34.0* 29.0 28.0   BUN mg/dL 16 17 11   CREATININE mg/dL 0.60 0.60 0.60   CALCIUM mg/dL 9.1 8.5* 9.0   BILIRUBIN mg/dL 0.3 0.2*  --    ALK PHOS U/L 166* 162*  --    ALT (SGPT) U/L 20 27  --    AST (SGOT) U/L 17 25  --    GLUCOSE mg/dL 230* 338* 560*       Condition on Discharge:  Stable    Physical Exam on Discharge:  Visit Vitals   • /75 (BP Location: Right arm, Patient Position: Lying)   • Pulse 91   • Temp 98.1 °F (36.7 °C) (Oral)   • Resp 18   • Ht 75\" (190.5 cm)   • Wt 262 lb 12.8 oz (119 kg)   • SpO2 94%   • BMI 32.85 kg/m2     Physical Exam   Constitutional: He is oriented to person, place, and time. He appears " well-developed and well-nourished. No distress.   HENT:   Head: Normocephalic and atraumatic.   Eyes: No scleral icterus.   Neck: Neck supple.   Cardiovascular: Normal rate, regular rhythm, normal heart sounds and intact distal pulses.  Exam reveals no friction rub.    No murmur heard.  Pulmonary/Chest: Effort normal and breath sounds normal. No respiratory distress. He has no wheezes. He has no rales.   Abdominal: Soft. Bowel sounds are normal. He exhibits no distension. There is no tenderness. There is no guarding.   Musculoskeletal: Normal range of motion. He exhibits no edema.   Neurological: He is alert and oriented to person, place, and time.   Skin: Skin is warm and dry. He is not diaphoretic. No pallor.   Left foot dressing clean, dry, intact, no drainage present on outside.    Psychiatric: He has a normal mood and affect. His behavior is normal. Judgment and thought content normal.         Discharge Disposition  Short Term Hospital (DC - External)    Discharge Medications   Kendrick Crystal   Home Medication Instructions JANET:[id number]    Printed on:02/03/17 8541   Medication Information                      acetaminophen (TYLENOL) 325 MG tablet  Take 2 tablets by mouth Every 6 (Six) Hours As Needed for moderate pain (4-6) or fever (Temperature greater than 101F).             aspirin 81 MG EC tablet  Take 1 tablet by mouth Daily.             atorvastatin (LIPITOR) 40 MG tablet  Take 1 tablet by mouth Every Night.             benzonatate (TESSALON) 100 MG capsule  Take 1 capsule by mouth 3 (Three) Times a Day As Needed for cough.             famotidine (PEPCID) 20 MG tablet  Take 1 tablet by mouth 2 (Two) Times a Day.             gabapentin (NEURONTIN) 400 MG capsule  Take 3 capsules by mouth 3 (Three) Times a Day.             HYDROcodone-acetaminophen (NORCO)  MG per tablet  Take 1 tablet by mouth Every 6 (Six) Hours As Needed for moderate pain (4-6) for up to 1 day.             insulin detemir  (LEVEMIR) 100 UNIT/ML injection  Inject 70 Units under the skin Daily.             insulin lispro (humaLOG) 100 UNIT/ML injection  Inject 20 Units under the skin 3 (Three) Times a Day With Meals.             insulin lispro (humaLOG) 100 UNIT/ML injection  Inject 0-9 Units under the skin 3 (Three) Times a Day With Meals.             insulin lispro (humaLOG) 100 UNIT/ML injection  Inject 2-7 Units under the skin At Night As Needed for high blood sugar.             lisinopril (PRINIVIL,ZESTRIL) 40 MG tablet  Take 1 tablet by mouth Daily.             melatonin 5 MG sublingual tablet sublingual tablet  Place 1 tablet under the tongue At Night As Needed (insomnia).             piperacillin-tazobactam (ZOSYN) 4-0.5 GM/100ML solution IVPB  Infuse 100 mL into a venous catheter Every 8 (Eight) Hours. Indications: Bone and Joint Infection             povidone-iodine (BETADINE) 10 % ointment  Apply  topically Daily. To bilateral feet ulcerations             probiotic (CULTURELLE) capsule capsule  Take 1 capsule by mouth Daily.             Vancomycin HCl in NaCl 1.5-0.9 GM/500ML-% solution IVPB  Infuse 500 mL into a venous catheter Every 12 (Twelve) Hours. Indications: Skin and Soft Tissue Infection                 Discharge Diet:   Diet Instructions     Diet: Regular, Consistent Carbohydrate, Cardiac; Thin Liquids, No Restrictions       Discharge Diet:   Regular  Consistent Carbohydrate  Cardiac      Fluid Consistency:  Thin Liquids, No Restrictions             Activity at Discharge:   Activity Instructions     Activity as Tolerated                 Follow-up Appointments  Future Appointments  Date Time Provider Department Center   2/15/2017 11:00 AM MD KRISTINA Verduzco END [facility] None   3/16/2017 12:30 PM MD KRISTINA Verduzco END  None     Referrals and Follow-ups to Schedule     Follow-Up    As directed    Follow up with PCP in 1 week of discharge from [facility]    Follow up with Dr. Martinez per his recommendations    Follow up  with Dr. Dong at [facility]                  CHINTAN Sebastian 02/03/17 1:14 PM    Time: Discharge 35 min    Please note that portions of this note may have been completed with a voice recognition program. Efforts were made to edit the dictations, but occasionally words are mistranscribed.       Electronically signed by CHINTAN Sebastian at 2/3/2017  1:40 PM

## 2017-02-03 NOTE — PROGRESS NOTES
"[facility] Progress Note    1/25/2017      Antibiotics:  IV Anti-Infectives     Ordered     Dose/Rate Route Frequency Start Stop    02/02/17 1311  vancomycin IVPB 1500 mg in 0.9% NaCl (Premix) 500 mL     Ordering Provider:  Meggan Phoenix RPH    1,500 mg Intravenous Every 12 Hours 02/02/17 2200      01/26/17 1328  piperacillin-tazobactam (ZOSYN) 4.5 g in dextrose 100 mL IVPB (premix)     Ordering Provider:  Sandra White MD    4.5 g Intravenous Every 8 Hours 01/26/17 1400      01/25/17 1213  cefTRIAXone (ROCEPHIN) IVPB 2 g     Ordering Provider:  LAUREN Hernández    2 g  over 30 Minutes Intravenous Once 01/25/17 1300 01/25/17 1331    01/25/17 1230  vancomycin 2000 mg/500 mL 0.9% NS IVPB ([facility])     Ordering Provider:  LAUREN Hernández    2,000 mg  over 120 Minutes Intravenous Once 01/25/17 1300 01/25/17 1555    01/25/17 1213  Pharmacy to dose vancomycin     Ordering Provider:  Franco Colin PA     Does not apply Continuous PRN 01/25/17 1213            CC: Left foot infection    HPI:  Dr. White consulted on January 26, 2017, noted:  Patient is a 46 y.o. Yr old male with CC of severe left foot infection in the setting of untreated diabetes mellitus (a1c 14.9), extensive comorbidity and claudication. He has had a chronic plantar ulcer at the [facility] of unknown duration. About 1 week ago he noticed change in color of the dorsum of his foot He was seen at the [facility] ER and says that they \"took a piece of tissue off his foot\" and prescribed an antibiotic According to the chart he was prescribed bactrim by his pcp The patient's wife is not present and he has poor recall of his medical history.     1/30 surgery by Dr. Martinez    2/2 CT angio    2/3/17 patient denies new pain and no new redness/swelling at the left leg.  He denies new intolerance to antibiotics.    ROS:      2/3/17 No f/c/s. No n/v/d. No rash. No new ADR to Abx.     PE:   Visit Vitals   • /88 (BP Location: Left arm, Patient Position: Lying)   • Pulse " "88   • Temp 98 °F (36.7 °C) (Oral)   • Resp 18   • Ht 75\" (190.5 cm)   • Wt 244 lb (111 kg)   • SpO2 94%   • BMI 30.5 kg/m2       GENERAL: Awake and alert, in no acute distress.   HEENT:  No conjunctival injection. No icterus. Oropharynx clear without evidence of thrush or exudate.     HEART: RRR; No murmur, rubs, gallops.   LUNGS: Clear to auscultation bilaterally without wheezing, rales, rhonchi. Normal respiratory effort. Nonlabored. No dullness.  ABDOMEN: Soft, nontender, nondistended. Positive bowel sounds. No rebound or guarding. NO mass or HSM.  EXT:  No cyanosis, clubbing or edema. No cord.  : Genitalia generally unremarkable.  Without Walker catheter.  SKIN: Warm and dry without cutaneous eruptions on Inspection/palpation aside from below.      Right foot with plantar ulcer w/o associated erythema or soft tissue swelling  Skin with multiple old scars necrobiosis and old abcsesses    Left foot surgical site noted.  Vague erythema/edema and no obvious purulence.  No crepitus or bulla.  No discrete mass bulge or fluctuance.    Laboratory Data      Results from last 7 days  Lab Units 02/03/17  0609 02/02/17  0947 01/31/17  0417   WBC 10*3/mm3 10.83* 9.49 12.24*   HEMOGLOBIN g/dL 11.8* 11.7* 11.6*   HEMATOCRIT % 36.3* 35.4* 34.6*   PLATELETS 10*3/mm3 238 209 220       Results from last 7 days  Lab Units 02/03/17  0609   SODIUM mmol/L 135   POTASSIUM mmol/L 4.0   CHLORIDE mmol/L 97*   TOTAL CO2 mmol/L 34.0*   BUN mg/dL 16   CREATININE mg/dL 0.60   GLUCOSE mg/dL 230*   CALCIUM mg/dL 9.1       Results from last 7 days  Lab Units 02/03/17  0609   ALK PHOS U/L 166*   BILIRUBIN mg/dL 0.3   ALT (SGPT) U/L 20   AST (SGOT) U/L 17               Estimated Creatinine Clearance: 202.5 mL/min (by C-G formula based on Cr of 0.6).      Microbiology:      Radiology:  Imaging Results (last 72 hours)     Procedure Component Value Units Date/Time    XR Chest PA & Lateral [[id number]] Collected:  01/27/17 1603     Updated:  01/30/17 " 0903    Narrative:       EXAMINATION: XR CHEST, PA AND LATERAL-01/27/2017:      INDICATION: Rule out pneumonia; L03.116-Cellulitis of left lower limb.      COMPARISON: 05/02/2008.     FINDINGS: The heart size is normal. There is no pneumothorax or  effusion. An area of increased attenuation is present in the left  costophrenic angle. The osseous structures of the chest are normal.           Impression:       Atelectasis versus infiltrate of left lower lobe/left  costophrenic angle.     D:  01/27/2017  E:  01/27/2017     This report was finalized on 1/30/2017 9:01 AM by Dr. Armando Morejon MD.       CT Lower Extremity Left With Contrast [[id number]] Collected:  01/25/17 1431     Updated:  01/30/17 1156    Narrative:       EXAMINATION:  CT SCAN OF THE LEFT FOOT WITH CONTRAST 01/25/2017     HISTORY: Left foot ulcer, cellulitis, history of diabetes     TECHNICAL: Exam consists of spiral acquisition 2 mm axial images through  the left foot with sagittal and coronal reconstructions at 2 mm.     The radiation dose reduction device was turned on for each scan per the  ALARA (As Low as Reasonably Achievable) protocol.      COMPARISON: None     FINDINGS: The patient's known foot ulcer is seen as air density in the  superficial soft tissues of the pad of the forefoot, between the fourth  and fifth MTP joints. Air is confined to the superficial 2 or 3 mm of  the ulceration and no deep soft tissue gas is appreciated. There is  focal dense edema over a roughly 3 cm area, and a roughly 15 mm area of  subcutaneous phlegmon, without evidence of discrete drainable abscess.  The phlegmonous area is contiguous with the soft tissue thickening  around the fifth MTP joint. The ulceration shows inflammation almost  contiguous with soft tissue thickening around the fourth MTP joint. No  bony erosive changes are seen to suggest osteomyelitis. There is no  obvious fluid collection within the joints to suggest septic arthritis.  Bony structures  generally appear intact and normally mineralized. No  fracture, destructive bony lesion or periosteal reaction is seen.       Impression:       Plantar ulcer deep to the fourth MTP joint, and focal  plantar phlegmon deep to the fifth MTP joint, that may represent  impending ulcer formation. Marginal involvement of the fourth and fifth  MTP joints but no evidence of septic arthritis, osteomyelitis, or  drainable abscess.     D:  01/252017  E:  01/25/2017     This report was finalized on 1/30/2017 11:54 AM by DR. Marquise Stafford MD.               Impression:   1 Extensive left foot infection- no osteo per MRI but  deep infection and had surgery January 30 by Dr. Martinez with metatarsal amputation at numbers 3/4/5.  Marginal perfusion per operative note.  Cultures with MRSA/strep and coag-negative staph.  Patient understands surgery/antibiotics are not a guarantee for cure.  He will have a ongoing risk for nonhealing/relapse and functional/limb loss with potential for higher level amputation.     2 DM a1c 14.9--you need to tightly control blood sugar to give best chance for healing     3 R/O PVD with history concerning for claudication--awaiting Dr Martinez feedback on angio; ?options     4 Stage 4 hepatic fibrosis And dx DUNLAP     5 Hx Lissa's Gangrene    PLAN:   --Vancomycin/Zosyn    --I discussed potential risks and benefits of the prescribed antibiotics that include, but are not limited to, solid organ toxicity, neuro/bala/vestibular toxicity, renal toxicity, CDiff, cytopenias, hypersensitivity,  etc.. Patient/Family voice understanding and agree to proceed.    --Discussed with microbiology    --Partial history per nursing staff    --Highly complex set of issues with high risk for further serious morbidity and other serious sequela    --I discussed with Dr. Martinez 2/2;  Awaiting his feedback from vascular w/u    Usman Dong MD  2/3/2017

## 2017-02-03 NOTE — PROGRESS NOTES
[facility] Medicine Services  INPATIENT PROGRESS NOTE    Date of Admission: 1/25/2017  Length of Stay: 9  Primary Care Physician: Juan Rios MD    Subjective     Chief Complaint:  Chills and nausea and low grade temps    HPI:  No new issues overnight, sleepy this am d/t pain medications     Review Of Systems:   General- no F/C  Pulm- no SOA, no cough  CVS- no chest pain, no palpitations      Objective      Temp:  [97.9 °F (36.6 °C)-98.2 °F (36.8 °C)] 98.1 °F (36.7 °C)  Heart Rate:  [] 91  Resp:  [18] 18  BP: (146-177)/() 146/75  Physical Exam  Gen:  The patient is awake and alert.  Poor dentition.  No apparent distress.    Neuro: alert and oriented, clear speech, follows commands, no sensation in feet or lower legs  HEENT:  Pupils are equally reactive and responsive to light.  Extraocular muscles are intact.  No oral lesion or thrush.  No palpable cervical lymphadenopathies  Neck:  Supple, no LAD.    Heart RRR no murmur, rub, or gallop  Lungs nonlaobred ctta.  No wheezing, rales or rhonchi  Abd:  Soft, nontender, no rebound or guarding, pos BS  Extrem:  No c/c/e.  Left foot covered with occlusive dressing.  Weak peripheral pulses          Assessment/Plan     Assessment/Problem List  Principal Problem:    Cellulitis of left foot  Active Problems:    Essential hypertension    Poorly controlled type 2 diabetes mellitus with peripheral neuropathy    Type 2 diabetes mellitus with foot ulcer, with long-term current use of insulin    DUNLAP (nonalcoholic steatohepatitis)    Hyponatremia    Neutrophilic leukocytosis      This is a 46-year-old morbidly obese  male with a past medical history significant for type 2 diabetes mellitus insulin-dependent poorly controlled with diabetic microvascular disease including retinopathy, neuropathy and possible nephropathy, poor medical compliance (patient has not been taking any medications for the past few months), hypertension,  hyperlipidemia  No known history of coronary artery disease or peripheral vascular disease  Developed left plantar ulcer following callous removal, complicated by superinfection and failure of outpatient about a treatment      1.  Diabetic foot ulcer of the left plantar area overlying the fourth and fifth metatarsophalangeal joints with phlegmon in the setting of underlying peripheral vascular disease as evidenced by CARLI measurements on the left side which was not calculated due to noncompressible vessels suggestive of disease involving the tibioperoneal arteries  Plan:  - Post operative day number 4 s/p 4/5th toe TM amputation  - Wound culture growing MRSA, group A strep and coag-negative staph  - On broad-spectrum empiric intravenous antibiotic treatment with Zosyn and vancomycin  - Further evaluation for possible revascularization amputation per CT surgery (CTA with runoff still PENDING read)    2.  Type 2 diabetes mellitus, insulin-dependent, poorly controlled with diabetic microvascular disease including retinopathy, neuropathy and nephropathy.  Hemoglobin A1c on admission was 14.1.  He was taking Lantus 10 units per day  Plan:  Increased Levemir to 70 units daily on 2/1/2017  Continue lispro to 20  units subcutaneous 3 times a day with meals and continue sliding scale  Added atorvastatin and ASA    3.  Hyperosmolar hyponatremia due to hyperglycemia leading to solvent drag.  No further treatment or evaluation is indicated other than correction of the hyperglycemia     4. History of essential hypertension.  Suboptimal control.  Increased lisinopril to 40 mg daily    5. History of DUNLAP (nonalcoholic steatohepatitis).  Stable.  No evidence of decompensated liver disease        Latha Lenz MD   02/03/17   10:52 AM

## 2017-02-03 NOTE — DISCHARGE SUMMARY
[facility] Medicine Services  DISCHARGE SUMMARY       Date of Admission: 1/25/2017  Date of Discharge:  2/3/2017  Primary Care Physician: Juan Rios MD    Discharge Diagnoses:  Active Hospital Problems (** Indicates Principal Problem)    Diagnosis Date Noted   • **Cellulitis of left foot [L03.116] 01/25/2017   • Poorly controlled type 2 diabetes mellitus with peripheral neuropathy [E11.42, E11.65] 01/25/2017   • Type 2 diabetes mellitus with foot ulcer, with long-term current use of insulin [E11.621, L97.509, Z79.4] 01/25/2017   • DUNLAP (nonalcoholic steatohepatitis) [K75.81] 01/25/2017   • Hyponatremia [E87.1] 01/25/2017   • Neutrophilic leukocytosis [D72.9] 01/25/2017   • Essential hypertension [I10] 01/17/2017      Resolved Hospital Problems    Diagnosis Date Noted Date Resolved   No resolved problems to display.       Presenting Problem/History of Present Illness  Cellulitis of left foot [L03.116]     Chief Complaint: Left foot ulceration     History of Present Illness on Day of Discharge: Laying in bed, wife at bedside. No new complaints. No chest pain, dyspnea, abdominal pain, n/v/d/c, fever or chills. Per MD Lenz he is now medically ready for transfer and Mr. Crystal and his wife are agreeable to transfer to [facility] today.     Hospital Course  Mr. Crystal is a 46 year old male with a history of DUNLAP s/p liver transplant 10/3/14, type II diabetes mellitus, hypertension, multiple previous skin abscesses and neuropathy who presented to [facility] ED on 1/25/17 with worsening erythema, edema and drainage of a left foot ulceration. The ulceration was diagnosed 1 month ago. Culture was positive for MRSA and he was treated with a 7 day course of bactrim. CT left lower extremity showed deep plantar ulcer to the fourth MTP joint, and focal plantar phlegmon deep to the fifth MTP joint. No osteo was per MRI.   He was followed by Dr. Dong, infectious disease. He was treated with IV Vancomycin and  Zosyn which he will continue at [facility].   Due to worsening cellulitis and extensive plantar ulceration Dr. Martinez, CT surgery, was consulted. Mr. Crystal did undergo a left 5th, 4th, and 3rd transmetatarsal amputation. He is currently receiving wet-to-dry dressing changes BID. Per Dr. Martinez's request he would like a wound vac placed on arrival to [facility]. A CT run off was performed and results are pending at this time. He will need to follow up with Dr. Martinez after discharge from [facility] for further evaluation for possible revascularization amputation.   Of note his admission HgbA1c was 14.9. His insulin regimen has been adjusted for better glucose control. Per the patient his glucose at home has been in the 400s recently. He will need to follow up with PCP in 1 week of discharge from [facility].      Procedures Performed  Procedure(s):  left fourth and fifth transmetatarsal toe amputation        Consults:   Consults     Date and Time Order Name Status Description    1/26/2017 1259 Inpatient Consult to Vascular Surgery Completed     1/25/2017 1717 Inpatient Consult to Infectious Diseases Completed           Pertinent Test Results:     Results from last 7 days  Lab Units 02/03/17  0609 02/02/17  0947 01/31/17  0417   WBC 10*3/mm3 10.83* 9.49 12.24*   HEMOGLOBIN g/dL 11.8* 11.7* 11.6*   HEMATOCRIT % 36.3* 35.4* 34.6*   PLATELETS 10*3/mm3 238 209 220       Results from last 7 days  Lab Units 02/03/17  0609 02/02/17  0947 01/31/17  0417   SODIUM mmol/L 135 134 131*   POTASSIUM mmol/L 4.0 4.2 3.8   CHLORIDE mmol/L 97* 98* 95*   TOTAL CO2 mmol/L 34.0* 29.0 28.0   BUN mg/dL 16 17 11   CREATININE mg/dL 0.60 0.60 0.60   CALCIUM mg/dL 9.1 8.5* 9.0   BILIRUBIN mg/dL 0.3 0.2*  --    ALK PHOS U/L 166* 162*  --    ALT (SGPT) U/L 20 27  --    AST (SGOT) U/L 17 25  --    GLUCOSE mg/dL 230* 338* 560*       Condition on Discharge:  Stable    Physical Exam on Discharge:  Visit Vitals   • /75 (BP Location: Right arm, Patient Position:  "Lying)   • Pulse 91   • Temp 98.1 °F (36.7 °C) (Oral)   • Resp 18   • Ht 75\" (190.5 cm)   • Wt 262 lb 12.8 oz (119 kg)   • SpO2 94%   • BMI 32.85 kg/m2     Physical Exam   Constitutional: He is oriented to person, place, and time. He appears well-developed and well-nourished. No distress.   HENT:   Head: Normocephalic and atraumatic.   Eyes: No scleral icterus.   Neck: Neck supple.   Cardiovascular: Normal rate, regular rhythm, normal heart sounds and intact distal pulses.  Exam reveals no friction rub.    No murmur heard.  Pulmonary/Chest: Effort normal and breath sounds normal. No respiratory distress. He has no wheezes. He has no rales.   Abdominal: Soft. Bowel sounds are normal. He exhibits no distension. There is no tenderness. There is no guarding.   Musculoskeletal: Normal range of motion. He exhibits no edema.   Neurological: He is alert and oriented to person, place, and time.   Skin: Skin is warm and dry. He is not diaphoretic. No pallor.   Left foot dressing clean, dry, intact, no drainage present on outside.    Psychiatric: He has a normal mood and affect. His behavior is normal. Judgment and thought content normal.         Discharge Disposition  Short Term Hospital (DC - External)    Discharge Medications   Kendrick Crystal   Home Medication Instructions JANET:[id number]    Printed on:02/03/17 1314   Medication Information                      acetaminophen (TYLENOL) 325 MG tablet  Take 2 tablets by mouth Every 6 (Six) Hours As Needed for moderate pain (4-6) or fever (Temperature greater than 101F).             aspirin 81 MG EC tablet  Take 1 tablet by mouth Daily.             atorvastatin (LIPITOR) 40 MG tablet  Take 1 tablet by mouth Every Night.             benzonatate (TESSALON) 100 MG capsule  Take 1 capsule by mouth 3 (Three) Times a Day As Needed for cough.             famotidine (PEPCID) 20 MG tablet  Take 1 tablet by mouth 2 (Two) Times a Day.             gabapentin (NEURONTIN) 400 MG " capsule  Take 3 capsules by mouth 3 (Three) Times a Day.             HYDROcodone-acetaminophen (NORCO)  MG per tablet  Take 1 tablet by mouth Every 6 (Six) Hours As Needed for moderate pain (4-6) for up to 1 day.             insulin detemir (LEVEMIR) 100 UNIT/ML injection  Inject 70 Units under the skin Daily.             insulin lispro (humaLOG) 100 UNIT/ML injection  Inject 20 Units under the skin 3 (Three) Times a Day With Meals.             insulin lispro (humaLOG) 100 UNIT/ML injection  Inject 0-9 Units under the skin 3 (Three) Times a Day With Meals.             insulin lispro (humaLOG) 100 UNIT/ML injection  Inject 2-7 Units under the skin At Night As Needed for high blood sugar.             lisinopril (PRINIVIL,ZESTRIL) 40 MG tablet  Take 1 tablet by mouth Daily.             melatonin 5 MG sublingual tablet sublingual tablet  Place 1 tablet under the tongue At Night As Needed (insomnia).             piperacillin-tazobactam (ZOSYN) 4-0.5 GM/100ML solution IVPB  Infuse 100 mL into a venous catheter Every 8 (Eight) Hours. Indications: Bone and Joint Infection             povidone-iodine (BETADINE) 10 % ointment  Apply  topically Daily. To bilateral feet ulcerations             probiotic (CULTURELLE) capsule capsule  Take 1 capsule by mouth Daily.             Vancomycin HCl in NaCl 1.5-0.9 GM/500ML-% solution IVPB  Infuse 500 mL into a venous catheter Every 12 (Twelve) Hours. Indications: Skin and Soft Tissue Infection                 Discharge Diet:   Diet Instructions     Diet: Regular, Consistent Carbohydrate, Cardiac; Thin Liquids, No Restrictions       Discharge Diet:   Regular  Consistent Carbohydrate  Cardiac      Fluid Consistency:  Thin Liquids, No Restrictions             Activity at Discharge:   Activity Instructions     Activity as Tolerated                 Follow-up Appointments  Future Appointments  Date Time Provider Department Center   2/15/2017 11:00 AM Callie MCDONALD MD [facility] None    3/16/2017 12:30 PM Callie MCDONALD MD [facility] None     Referrals and Follow-ups to Schedule     Follow-Up    As directed    Follow up with PCP in 1 week of discharge from [facility]    Follow up with Dr. Martinez per his recommendations    Follow up with Dr. Dong at [facility]                  CHINTAN Sebastian 02/03/17 1:14 PM    Time: Discharge 35 min    Please note that portions of this note may have been completed with a voice recognition program. Efforts were made to edit the dictations, but occasionally words are mistranscribed.

## 2017-02-03 NOTE — PLAN OF CARE
Problem: Patient Care Overview (Adult)  Goal: Plan of Care Review  Outcome: Ongoing (interventions implemented as appropriate)  Goal: Discharge Needs Assessment  Outcome: Ongoing (interventions implemented as appropriate)    Problem: Diabetes, Type 1 (Adult)  Goal: Signs and Symptoms of Listed Potential Problems Will be Absent or Manageable (Diabetes, Type 1)  Outcome: Ongoing (interventions implemented as appropriate)    Problem: Fall Risk (Adult)  Goal: Identify Related Risk Factors and Signs and Symptoms  Outcome: Ongoing (interventions implemented as appropriate)  Goal: Absence of Falls  Outcome: Ongoing (interventions implemented as appropriate)    Problem: Skin Integrity Impairment, Risk/Actual (Adult)  Goal: Identify Related Risk Factors and Signs and Symptoms  Outcome: Ongoing (interventions implemented as appropriate)  Goal: Skin Integrity/Wound Healing  Outcome: Ongoing (interventions implemented as appropriate)    Problem: Cellulitis (Adult)  Goal: Signs and Symptoms of Listed Potential Problems Will be Absent or Manageable (Cellulitis)  Outcome: Ongoing (interventions implemented as appropriate)

## 2017-02-13 ENCOUNTER — HOSPITAL ENCOUNTER (INPATIENT)
Facility: HOSPITAL | Age: 47
LOS: 4 days | Discharge: HOME HEALTH | End: 2017-02-17
Attending: FAMILY MEDICINE | Admitting: INTERNAL MEDICINE

## 2017-02-13 ENCOUNTER — TELEPHONE (OUTPATIENT)
Dept: ENDOCRINOLOGY | Facility: CLINIC | Age: 47
End: 2017-02-13

## 2017-02-13 ENCOUNTER — APPOINTMENT (OUTPATIENT)
Dept: GENERAL RADIOLOGY | Facility: HOSPITAL | Age: 47
End: 2017-02-13

## 2017-02-13 DIAGNOSIS — Z74.09 IMPAIRED FUNCTIONAL MOBILITY, BALANCE, GAIT, AND ENDURANCE: Primary | ICD-10-CM

## 2017-02-13 PROBLEM — E83.42 HYPOMAGNESEMIA: Status: ACTIVE | Noted: 2017-02-13

## 2017-02-13 PROBLEM — IMO0002 UNCONTROLLED TYPE 1 DIABETES MELLITUS WITH CIRCULATORY COMPLICATION: Status: ACTIVE | Noted: 2017-02-13

## 2017-02-13 PROBLEM — E11.621 DIABETIC FOOT ULCER (HCC): Status: ACTIVE | Noted: 2017-02-13

## 2017-02-13 PROBLEM — L97.509 DIABETIC FOOT ULCER (HCC): Status: ACTIVE | Noted: 2017-02-13

## 2017-02-13 LAB
ABO GROUP BLD: NORMAL
ABO GROUP BLD: NORMAL
ALBUMIN SERPL-MCNC: 3.6 G/DL (ref 3.2–4.8)
ALBUMIN/GLOB SERPL: 1.1 G/DL (ref 1.5–2.5)
ALP SERPL-CCNC: 173 U/L (ref 25–100)
ALT SERPL W P-5'-P-CCNC: 23 U/L (ref 7–40)
ANION GAP SERPL CALCULATED.3IONS-SCNC: 7 MMOL/L (ref 3–11)
AST SERPL-CCNC: 26 U/L (ref 0–33)
BASOPHILS # BLD AUTO: 0.04 10*3/MM3 (ref 0–0.2)
BASOPHILS NFR BLD AUTO: 0.4 % (ref 0–1)
BILIRUB SERPL-MCNC: 0.4 MG/DL (ref 0.3–1.2)
BLD GP AB SCN SERPL QL: NEGATIVE
BNP SERPL-MCNC: 70 PG/ML (ref 0–100)
BUN BLD-MCNC: 14 MG/DL (ref 9–23)
BUN/CREAT SERPL: 35 (ref 7–25)
CALCIUM SPEC-SCNC: 9.5 MG/DL (ref 8.7–10.4)
CHLORIDE SERPL-SCNC: 101 MMOL/L (ref 99–109)
CO2 SERPL-SCNC: 28 MMOL/L (ref 20–31)
CREAT BLD-MCNC: 0.4 MG/DL (ref 0.6–1.3)
DEPRECATED RDW RBC AUTO: 43.8 FL (ref 37–54)
EOSINOPHIL # BLD AUTO: 0.98 10*3/MM3 (ref 0.1–0.3)
EOSINOPHIL NFR BLD AUTO: 8.8 % (ref 0–3)
ERYTHROCYTE [DISTWIDTH] IN BLOOD BY AUTOMATED COUNT: 14 % (ref 11.3–14.5)
GFR SERPL CREATININE-BSD FRML MDRD: >150 ML/MIN/1.73
GLOBULIN UR ELPH-MCNC: 3.2 GM/DL
GLUCOSE BLD-MCNC: 134 MG/DL (ref 70–100)
GLUCOSE BLDC GLUCOMTR-MCNC: 140 MG/DL (ref 70–130)
HCT VFR BLD AUTO: 33.5 % (ref 38.9–50.9)
HGB BLD-MCNC: 10.9 G/DL (ref 13.1–17.5)
IMM GRANULOCYTES # BLD: 0.02 10*3/MM3 (ref 0–0.03)
IMM GRANULOCYTES NFR BLD: 0.2 % (ref 0–0.6)
INR PPP: 1.04
LYMPHOCYTES # BLD AUTO: 1.68 10*3/MM3 (ref 0.6–4.8)
LYMPHOCYTES NFR BLD AUTO: 15.1 % (ref 24–44)
MAGNESIUM SERPL-MCNC: 1.8 MG/DL (ref 1.3–2.7)
MCH RBC QN AUTO: 28.2 PG (ref 27–31)
MCHC RBC AUTO-ENTMCNC: 32.5 G/DL (ref 32–36)
MCV RBC AUTO: 86.6 FL (ref 80–99)
MONOCYTES # BLD AUTO: 0.86 10*3/MM3 (ref 0–1)
MONOCYTES NFR BLD AUTO: 7.7 % (ref 0–12)
NEUTROPHILS # BLD AUTO: 7.57 10*3/MM3 (ref 1.5–8.3)
NEUTROPHILS NFR BLD AUTO: 67.8 % (ref 41–71)
PHOSPHATE SERPL-MCNC: 4 MG/DL (ref 2.4–5.1)
PLATELET # BLD AUTO: 236 10*3/MM3 (ref 150–450)
PMV BLD AUTO: 10 FL (ref 6–12)
POTASSIUM BLD-SCNC: 4 MMOL/L (ref 3.5–5.5)
PROT SERPL-MCNC: 6.8 G/DL (ref 5.7–8.2)
PROTHROMBIN TIME: 11.4 SECONDS (ref 9.6–11.5)
RBC # BLD AUTO: 3.87 10*6/MM3 (ref 4.2–5.76)
RH BLD: POSITIVE
RH BLD: POSITIVE
SODIUM BLD-SCNC: 136 MMOL/L (ref 132–146)
WBC NRBC COR # BLD: 11.15 10*3/MM3 (ref 3.5–10.8)

## 2017-02-13 PROCEDURE — 83735 ASSAY OF MAGNESIUM: CPT | Performed by: NURSE PRACTITIONER

## 2017-02-13 PROCEDURE — 86850 RBC ANTIBODY SCREEN: CPT

## 2017-02-13 PROCEDURE — 86901 BLOOD TYPING SEROLOGIC RH(D): CPT

## 2017-02-13 PROCEDURE — 87040 BLOOD CULTURE FOR BACTERIA: CPT | Performed by: NURSE PRACTITIONER

## 2017-02-13 PROCEDURE — 86900 BLOOD TYPING SEROLOGIC ABO: CPT

## 2017-02-13 PROCEDURE — 85025 COMPLETE CBC W/AUTO DIFF WBC: CPT | Performed by: NURSE PRACTITIONER

## 2017-02-13 PROCEDURE — 93010 ELECTROCARDIOGRAM REPORT: CPT | Performed by: INTERNAL MEDICINE

## 2017-02-13 PROCEDURE — 83880 ASSAY OF NATRIURETIC PEPTIDE: CPT | Performed by: NURSE PRACTITIONER

## 2017-02-13 PROCEDURE — 71010 HC CHEST PA OR AP: CPT

## 2017-02-13 PROCEDURE — 25010000002 VANCOMYCIN HCL IN NACL 1.5-0.9 GM/500ML-% SOLUTION

## 2017-02-13 PROCEDURE — 63710000001 INSULIN DETEMIR PER 5 UNITS: Performed by: NURSE PRACTITIONER

## 2017-02-13 PROCEDURE — 84100 ASSAY OF PHOSPHORUS: CPT | Performed by: NURSE PRACTITIONER

## 2017-02-13 PROCEDURE — 82962 GLUCOSE BLOOD TEST: CPT

## 2017-02-13 PROCEDURE — 25010000002 PIPERACILLIN-TAZOBACTAM

## 2017-02-13 PROCEDURE — 93005 ELECTROCARDIOGRAM TRACING: CPT | Performed by: NURSE PRACTITIONER

## 2017-02-13 PROCEDURE — 99223 1ST HOSP IP/OBS HIGH 75: CPT | Performed by: FAMILY MEDICINE

## 2017-02-13 PROCEDURE — 85610 PROTHROMBIN TIME: CPT | Performed by: NURSE PRACTITIONER

## 2017-02-13 PROCEDURE — 80053 COMPREHEN METABOLIC PANEL: CPT | Performed by: NURSE PRACTITIONER

## 2017-02-13 RX ORDER — NICOTINE POLACRILEX 4 MG
15 LOZENGE BUCCAL
Status: DISCONTINUED | OUTPATIENT
Start: 2017-02-13 | End: 2017-02-17 | Stop reason: HOSPADM

## 2017-02-13 RX ORDER — ACETAMINOPHEN 325 MG/1
650 TABLET ORAL EVERY 6 HOURS PRN
Status: DISCONTINUED | OUTPATIENT
Start: 2017-02-13 | End: 2017-02-17 | Stop reason: HOSPADM

## 2017-02-13 RX ORDER — DOCUSATE SODIUM 100 MG/1
100 CAPSULE, LIQUID FILLED ORAL 2 TIMES DAILY
Status: DISCONTINUED | OUTPATIENT
Start: 2017-02-13 | End: 2017-02-17 | Stop reason: HOSPADM

## 2017-02-13 RX ORDER — POLYETHYLENE GLYCOL 3350 17 G/17G
17 POWDER, FOR SOLUTION ORAL DAILY
Status: DISCONTINUED | OUTPATIENT
Start: 2017-02-14 | End: 2017-02-17 | Stop reason: HOSPADM

## 2017-02-13 RX ORDER — SODIUM CHLORIDE 0.9 % (FLUSH) 0.9 %
1-10 SYRINGE (ML) INJECTION AS NEEDED
Status: DISCONTINUED | OUTPATIENT
Start: 2017-02-13 | End: 2017-02-17 | Stop reason: HOSPADM

## 2017-02-13 RX ORDER — VANCOMYCIN/0.9 % SOD CHLORIDE 1.5G/250ML
1500 PLASTIC BAG, INJECTION (ML) INTRAVENOUS EVERY 12 HOURS
Status: DISCONTINUED | OUTPATIENT
Start: 2017-02-13 | End: 2017-02-14 | Stop reason: DRUGHIGH

## 2017-02-13 RX ORDER — DEXTROSE MONOHYDRATE 25 G/50ML
25 INJECTION, SOLUTION INTRAVENOUS
Status: DISCONTINUED | OUTPATIENT
Start: 2017-02-13 | End: 2017-02-17 | Stop reason: HOSPADM

## 2017-02-13 RX ORDER — VANCOMYCIN/0.9 % SOD CHLORIDE 1.5G/250ML
1500 PLASTIC BAG, INJECTION (ML) INTRAVENOUS EVERY 12 HOURS
Status: DISCONTINUED | OUTPATIENT
Start: 2017-02-14 | End: 2017-02-13

## 2017-02-13 RX ADMIN — VANCOMYCIN HYDROCHLORIDE 1500 MG: 1 INJECTION, POWDER, LYOPHILIZED, FOR SOLUTION INTRAVENOUS at 23:08

## 2017-02-13 RX ADMIN — TAZOBACTAM SODIUM AND PIPERACILLIN SODIUM 4.5 G: .5; 4 INJECTION, POWDER, LYOPHILIZED, FOR SOLUTION INTRAVENOUS at 23:09

## 2017-02-13 RX ADMIN — DOCUSATE SODIUM 100 MG: 100 CAPSULE, LIQUID FILLED ORAL at 23:09

## 2017-02-13 RX ADMIN — INSULIN DETEMIR 30 UNITS: 100 INJECTION, SOLUTION SUBCUTANEOUS at 23:10

## 2017-02-13 NOTE — TELEPHONE ENCOUNTER
----- Message from Hemalatha Strickland sent at 2/13/2017  9:45 AM EST -----  The patient has recently been in the hospital, they amputated 3 of his toes and part of his foot. THey are in the [facility] specialty [facility] right now, and they are waiting to be transferred back to [facility] to amputate the rest of his foot. He had to cancel his apt on 02/15 and he doesn't know when he will be able to come back in, but he wanted to let Dr. Manuel White his wife can be reached at [phone].

## 2017-02-14 LAB
GLUCOSE BLDC GLUCOMTR-MCNC: 119 MG/DL (ref 70–130)
GLUCOSE BLDC GLUCOMTR-MCNC: 127 MG/DL (ref 70–130)
GLUCOSE BLDC GLUCOMTR-MCNC: 132 MG/DL (ref 70–130)
GLUCOSE BLDC GLUCOMTR-MCNC: 176 MG/DL (ref 70–130)
VANCOMYCIN TROUGH SERPL-MCNC: 10 MCG/ML (ref 10–20)

## 2017-02-14 PROCEDURE — 99233 SBSQ HOSP IP/OBS HIGH 50: CPT | Performed by: INTERNAL MEDICINE

## 2017-02-14 PROCEDURE — 97110 THERAPEUTIC EXERCISES: CPT

## 2017-02-14 PROCEDURE — 97162 PT EVAL MOD COMPLEX 30 MIN: CPT

## 2017-02-14 PROCEDURE — 25010000002 VANCOMYCIN 1750 MG/500ML SOLUTION

## 2017-02-14 PROCEDURE — 82962 GLUCOSE BLOOD TEST: CPT

## 2017-02-14 PROCEDURE — 25010000002 PIPERACILLIN-TAZOBACTAM

## 2017-02-14 PROCEDURE — 97116 GAIT TRAINING THERAPY: CPT

## 2017-02-14 PROCEDURE — 25010000002 HEPARIN (PORCINE) PER 1000 UNITS: Performed by: INTERNAL MEDICINE

## 2017-02-14 PROCEDURE — 63710000001 INSULIN DETEMIR PER 5 UNITS: Performed by: NURSE PRACTITIONER

## 2017-02-14 PROCEDURE — 25010000002 VANCOMYCIN HCL IN NACL 1.5-0.9 GM/500ML-% SOLUTION

## 2017-02-14 PROCEDURE — 63710000001 INSULIN LISPRO (HUMAN) PER 5 UNITS: Performed by: NURSE PRACTITIONER

## 2017-02-14 PROCEDURE — 02HV33Z INSERTION OF INFUSION DEVICE INTO SUPERIOR VENA CAVA, PERCUTANEOUS APPROACH: ICD-10-PCS | Performed by: FAMILY MEDICINE

## 2017-02-14 PROCEDURE — 80202 ASSAY OF VANCOMYCIN: CPT

## 2017-02-14 RX ORDER — FAMOTIDINE 20 MG/1
20 TABLET, FILM COATED ORAL 2 TIMES DAILY
Status: DISCONTINUED | OUTPATIENT
Start: 2017-02-14 | End: 2017-02-17 | Stop reason: HOSPADM

## 2017-02-14 RX ORDER — ATORVASTATIN CALCIUM 40 MG/1
40 TABLET, FILM COATED ORAL NIGHTLY
Status: DISCONTINUED | OUTPATIENT
Start: 2017-02-14 | End: 2017-02-15

## 2017-02-14 RX ORDER — LISINOPRIL 20 MG/1
40 TABLET ORAL
Status: DISCONTINUED | OUTPATIENT
Start: 2017-02-14 | End: 2017-02-15

## 2017-02-14 RX ORDER — ASPIRIN 81 MG/1
81 TABLET ORAL DAILY
Status: DISCONTINUED | OUTPATIENT
Start: 2017-02-14 | End: 2017-02-17 | Stop reason: HOSPADM

## 2017-02-14 RX ORDER — HYDROCODONE BITARTRATE AND ACETAMINOPHEN 10; 325 MG/1; MG/1
1 TABLET ORAL 3 TIMES DAILY PRN
Status: DISCONTINUED | OUTPATIENT
Start: 2017-02-14 | End: 2017-02-17 | Stop reason: HOSPADM

## 2017-02-14 RX ORDER — GABAPENTIN 300 MG/1
1200 CAPSULE ORAL EVERY 8 HOURS SCHEDULED
Status: DISCONTINUED | OUTPATIENT
Start: 2017-02-14 | End: 2017-02-17 | Stop reason: HOSPADM

## 2017-02-14 RX ORDER — VANCOMYCIN/0.9 % SOD CHLORIDE 1.75 G/5
1750 PLASTIC BAG, INJECTION (ML) INTRAVENOUS EVERY 12 HOURS SCHEDULED
Status: DISCONTINUED | OUTPATIENT
Start: 2017-02-14 | End: 2017-02-15

## 2017-02-14 RX ORDER — BENZONATATE 100 MG/1
100 CAPSULE ORAL 3 TIMES DAILY PRN
Status: DISCONTINUED | OUTPATIENT
Start: 2017-02-14 | End: 2017-02-17 | Stop reason: HOSPADM

## 2017-02-14 RX ORDER — HEPARIN SODIUM 5000 [USP'U]/ML
5000 INJECTION, SOLUTION INTRAVENOUS; SUBCUTANEOUS EVERY 12 HOURS SCHEDULED
Status: DISCONTINUED | OUTPATIENT
Start: 2017-02-14 | End: 2017-02-17 | Stop reason: HOSPADM

## 2017-02-14 RX ORDER — HYDROCODONE BITARTRATE AND ACETAMINOPHEN 5; 325 MG/1; MG/1
1 TABLET ORAL 2 TIMES DAILY PRN
Status: DISCONTINUED | OUTPATIENT
Start: 2017-02-14 | End: 2017-02-14

## 2017-02-14 RX ORDER — LACTOBACILLUS RHAMNOSUS GG 10B CELL
1 CAPSULE ORAL DAILY
Status: DISCONTINUED | OUTPATIENT
Start: 2017-02-14 | End: 2017-02-17 | Stop reason: HOSPADM

## 2017-02-14 RX ADMIN — FAMOTIDINE 20 MG: 20 TABLET ORAL at 09:02

## 2017-02-14 RX ADMIN — INSULIN LISPRO 10 UNITS: 100 INJECTION, SOLUTION INTRAVENOUS; SUBCUTANEOUS at 17:39

## 2017-02-14 RX ADMIN — LISINOPRIL 40 MG: 20 TABLET ORAL at 09:02

## 2017-02-14 RX ADMIN — GABAPENTIN 1200 MG: 300 CAPSULE ORAL at 14:47

## 2017-02-14 RX ADMIN — FAMOTIDINE 20 MG: 20 TABLET ORAL at 17:37

## 2017-02-14 RX ADMIN — GABAPENTIN 1200 MG: 300 CAPSULE ORAL at 06:34

## 2017-02-14 RX ADMIN — Medication 1750 MG: at 23:04

## 2017-02-14 RX ADMIN — HYDROCODONE BITARTRATE AND ACETAMINOPHEN 1 TABLET: 5; 325 TABLET ORAL at 09:46

## 2017-02-14 RX ADMIN — HEPARIN SODIUM 5000 UNITS: 5000 INJECTION, SOLUTION INTRAVENOUS; SUBCUTANEOUS at 14:54

## 2017-02-14 RX ADMIN — TAZOBACTAM SODIUM AND PIPERACILLIN SODIUM 4.5 G: .5; 4 INJECTION, POWDER, LYOPHILIZED, FOR SOLUTION INTRAVENOUS at 14:47

## 2017-02-14 RX ADMIN — ASPIRIN 81 MG: 81 TABLET, COATED ORAL at 09:02

## 2017-02-14 RX ADMIN — ATORVASTATIN CALCIUM 40 MG: 40 TABLET, FILM COATED ORAL at 21:35

## 2017-02-14 RX ADMIN — INSULIN DETEMIR 30 UNITS: 100 INJECTION, SOLUTION SUBCUTANEOUS at 09:02

## 2017-02-14 RX ADMIN — HYDROCODONE BITARTRATE AND ACETAMINOPHEN 1 TABLET: 10; 325 TABLET ORAL at 23:04

## 2017-02-14 RX ADMIN — INSULIN LISPRO 10 UNITS: 100 INJECTION, SOLUTION INTRAVENOUS; SUBCUTANEOUS at 09:05

## 2017-02-14 RX ADMIN — Medication 1750 MG: at 12:30

## 2017-02-14 RX ADMIN — Medication 5 MG: at 21:35

## 2017-02-14 RX ADMIN — Medication 1 CAPSULE: at 09:02

## 2017-02-14 RX ADMIN — INSULIN DETEMIR 30 UNITS: 100 INJECTION, SOLUTION SUBCUTANEOUS at 21:37

## 2017-02-14 RX ADMIN — HYDROCODONE BITARTRATE AND ACETAMINOPHEN 1 TABLET: 10; 325 TABLET ORAL at 16:40

## 2017-02-14 RX ADMIN — TAZOBACTAM SODIUM AND PIPERACILLIN SODIUM 4.5 G: .5; 4 INJECTION, POWDER, LYOPHILIZED, FOR SOLUTION INTRAVENOUS at 21:35

## 2017-02-14 RX ADMIN — HEPARIN SODIUM 5000 UNITS: 5000 INJECTION, SOLUTION INTRAVENOUS; SUBCUTANEOUS at 21:35

## 2017-02-14 RX ADMIN — GABAPENTIN 1200 MG: 300 CAPSULE ORAL at 21:35

## 2017-02-14 RX ADMIN — INSULIN LISPRO 2 UNITS: 100 INJECTION, SOLUTION INTRAVENOUS; SUBCUTANEOUS at 09:05

## 2017-02-14 RX ADMIN — TAZOBACTAM SODIUM AND PIPERACILLIN SODIUM 4.5 G: .5; 4 INJECTION, POWDER, LYOPHILIZED, FOR SOLUTION INTRAVENOUS at 06:34

## 2017-02-14 NOTE — PLAN OF CARE
Problem: Patient Care Overview (Adult)  Goal: Plan of Care Review  Outcome: Ongoing (interventions implemented as appropriate)    02/14/17 0439   Coping/Psychosocial Response Interventions   Plan Of Care Reviewed With patient   Patient Care Overview   Progress no change         Problem: Skin Integrity Impairment, Risk/Actual (Adult)  Goal: Identify Related Risk Factors and Signs and Symptoms  Outcome: Ongoing (interventions implemented as appropriate)

## 2017-02-14 NOTE — PLAN OF CARE
Problem: Patient Care Overview (Adult)  Goal: Plan of Care Review  Outcome: Ongoing (interventions implemented as appropriate)    02/14/17 1650   Coping/Psychosocial Response Interventions   Plan Of Care Reviewed With patient;family   Patient Care Overview   Progress unable to show any progress toward functional goals   Outcome Evaluation   Outcome Summary/Follow up Plan pts vitals today, up in chair and ambulated in melendrez.        Goal: Adult Individualization and Mutuality  Outcome: Ongoing (interventions implemented as appropriate)  Goal: Discharge Needs Assessment  Outcome: Ongoing (interventions implemented as appropriate)    Problem: Skin Integrity Impairment, Risk/Actual (Adult)  Goal: Identify Related Risk Factors and Signs and Symptoms  Outcome: Ongoing (interventions implemented as appropriate)  Goal: Skin Integrity/Wound Healing  Outcome: Ongoing (interventions implemented as appropriate)

## 2017-02-14 NOTE — PROGRESS NOTES
[facility] Medicine Services  INPATIENT PROGRESS NOTE    Date of Admission: 2/13/2017  Length of Stay: 1  Primary Care Physician: Juan Rios MD    Subjective     Chief Complaint: foot ulceration  HPI:    Foot ulceration unchanged. Patient would like to discuss further surgical options. Denies fever or chills. Requests that lortab dose be increased to prior discharge level.      Review Of Systems:   Review of Systems   Constitutional: Negative.    HENT: Negative.    Eyes: Negative.    Respiratory: Negative.    Cardiovascular: Negative.    Gastrointestinal: Negative.    Endocrine: Negative.    Genitourinary: Negative.    Musculoskeletal: Positive for myalgias.   Skin: Positive for wound.   Neurological: Negative.    Hematological: Negative.    Psychiatric/Behavioral: Negative.          Objective      Temp:  [97.8 °F (36.6 °C)-98.2 °F (36.8 °C)] 97.8 °F (36.6 °C)  Heart Rate:  [] 98  Resp:  [18] 18  BP: (160-184)/() 169/97  Physical Exam    Alert, oriented, lying in bed  obese  RRR, no murmur rub or gallop  CTAB  Abdomen soft, non tender, non distended, normal bowel sounds  Left foot with ulceration in clean wraps  Normal affect  Moves all extremities    Results Review:    I have reviewed the labs, radiology results and diagnostic studies.      Results from last 7 days  Lab Units 02/13/17 2033   WBC 10*3/mm3 11.15*   HEMOGLOBIN g/dL 10.9*   PLATELETS 10*3/mm3 236       Results from last 7 days  Lab Units 02/13/17 2033   SODIUM mmol/L 136   POTASSIUM mmol/L 4.0   TOTAL CO2 mmol/L 28.0   CREATININE mg/dL 0.40*   GLUCOSE mg/dL 134*       Culture Data:   BLOOD CULTURE   Date Value Ref Range Status   02/13/2017 No growth at less than 24 hours  Preliminary   02/13/2017 No growth at less than 24 hours  Preliminary     Radiology Data:     I have reviewed the medications.    aspirin 81 mg Oral Daily   atorvastatin 40 mg Oral Nightly   docusate sodium 100 mg Oral BID   famotidine 20  mg Oral BID   gabapentin 1,200 mg Oral Q8H   insulin detemir 30 Units Subcutaneous Q12H   insulin lispro 10 Units Subcutaneous TID With Meals   insulin lispro 2-7 Units Subcutaneous 4x Daily AC & at Bedtime   lisinopril 40 mg Oral Q24H   piperacillin-tazobactam 4.5 g Intravenous Q8H   polyethylene glycol 17 g Oral Daily   probiotic 1 capsule Oral Daily   vancomycin 1,750 mg Intravenous Q12H       Assessment/Plan     Assessment/Problem List  Principal Problem:    Diabetic foot ulcer associated with diabetes mellitus due to underlying condition  Active Problems:    Essential hypertension    Hypertriglyceridemia, essential    Poorly controlled type 2 diabetes mellitus with peripheral neuropathy    Type 2 diabetes mellitus with foot ulcer, with long-term current use of insulin    DUNLAP (nonalcoholic steatohepatitis)    Hyponatremia    Hypomagnesemia    Diabetic foot ulcer           Plan    Diabetic foot ulcer  - possible poor healing at LTAC, surgical re-evaluation today  - pain control  - vanc/zosyn    DMII  - poor home control, discussed need for tighter glucose regulation at eventual discharge    DUNLAP with stage IV hepatic fibrosis    Dyslipidemia    HTN    Peripheral Neuropathy     DVT prophylaxis: heparin  Discharge Planning: I expect patient to be discharged to LTAC? in ? days.    Rafy Caro MD   02/14/17   1:26 PM    Please note that portions of this note may have been completed with a voice recognition program. Efforts were made to edit the dictations, but occasionally words are mistranscribed.

## 2017-02-14 NOTE — CONSULTS
Kendrick Crystal  1970  [id number]    Date of Consult: 2/14/2017 2/13/2017      Requesting Provider: Mami Goode DO  Evaluating Physician: Usman Dong MD    Chief Complaint: left foot infection    Reason for Consultation: left foot infection    History of present illness:    Patient is a 46 y.o.  Yr old male with history of history uncontrolled diabetes with extensive comorbidity as previously outlined.  He has lower extremity vascular disease but no specific option for revascularization per discussion with Dr. Martinez.  He was admitted January 2017 with left foot infection, MRI not confirming osteomyelitis, but had surgery on January 30 with metatarsals 3/4/5 amputated, marginal perfusion per operative note and MRSA/strep/coag-negative staph in his various cultures.  He was transferred to [facility] on broad-spectrum IV antibiotics.  While at [facility], he was under the care of Dr. Herrera and, per family/patient, he was told that he required hyperbaric oxygen therapy and had very little chance for long-term healing at the foot.  He apparently was readmitted on February 13 to [facility] with patient interested in having higher level amputation.    He denies fevers chills or sweats.  No nausea vomiting or diarrhea.  No new skin rash.  No particular intolerance to antibiotics.  No headache photophobia or neck stiffness.  No shortness of breath cough or hemoptysis.  No tinnitus or hearing/balance change.        Past Medical History   Diagnosis Date   • Counseling for insulin pump    • Diabetes mellitus    • Encephalopathy, hepatic    • H/O degenerative disc disease    • History of Lissa's gangrene    • Hypertension    • multiple Skin abscesses    • DUNLAP, bx showed stage IV fibrosis    • Neuropathy        Past Surgical History   Procedure Laterality Date   • Testicle surgery       DEBRIDEMENT FROM GANGRENE   • Leg surgery     • Amputation digit Left 1/30/2017     Procedure: left fourth and  fifth transmetatarsal toe amputation ;  Surgeon: Juancho Martinez MD;  Location: [facility];  Service:        Pediatric History   Patient Guardian Status   • Not on file.     Other Topics Concern   • Not on file     Social History Narrative    he denies current tobacco alcohol or illicit drugs    family history includes Arthritis in his father and mother; Cancer in his mother; Diabetes in his brother and father; Heart attack in his father; Hyperlipidemia in his father; Hypertension in his brother and mother; Kidney disease in his mother; Mental illness in his sister; Obesity in his brother; Stroke in his mother.    Allergies   Allergen Reactions   • No Known Drug Allergy        Medication:  Current Facility-Administered Medications   Medication Dose Route Frequency Provider Last Rate Last Dose   • acetaminophen (TYLENOL) tablet 650 mg  650 mg Oral Q6H PRN CHINTAN Garcia       • aspirin EC tablet 81 mg  81 mg Oral Daily CHINTAN Garcia   81 mg at 02/14/17 0902   • atorvastatin (LIPITOR) tablet 40 mg  40 mg Oral Nightly CHINTAN Garcia       • benzonatate (TESSALON) capsule 100 mg  100 mg Oral TID PRN CHINTAN Garcia       • dextrose (D50W) solution 25 g  25 g Intravenous Q15 Min PRN CHINTAN Garcia       • dextrose (GLUTOSE) oral gel 15 g  15 g Oral Q15 Min PRN CHINTAN Garcia       • docusate sodium (COLACE) capsule 100 mg  100 mg Oral BID CHINTAN Garcia   100 mg at 02/14/17 0902   • famotidine (PEPCID) tablet 20 mg  20 mg Oral BID CHINTAN Garcia   20 mg at 02/14/17 0902   • gabapentin (NEURONTIN) capsule 1,200 mg  1,200 mg Oral Q8H CHINTAN Garcia   1,200 mg at 02/14/17 0634   • glucagon (GLUCAGEN) injection 1 mg  1 mg Subcutaneous Q15 Min PRN CHINTAN Garcia       • insulin detemir (LEVEMIR) injection 30 Units  30 Units Subcutaneous Q12H CHINTAN Garcia   30 Units at 02/14/17 0902   • insulin lispro (humaLOG) injection 10 Units  10 Units  Subcutaneous TID With Meals CHINTAN Garcia   10 Units at 02/14/17 0905   • insulin lispro (humaLOG) injection 2-7 Units  2-7 Units Subcutaneous 4x Daily AC & at Bedtime CHINTAN Garcia   2 Units at 02/13/17 2257   • lisinopril (PRINIVIL,ZESTRIL) tablet 40 mg  40 mg Oral Q24H CHINTAN Garcia   40 mg at 02/14/17 0902   • melatonin sublingual tablet 5 mg  5 mg Sublingual Nightly PRN CHINTAN Garcia       • Pharmacy to dose vancomycin   Does not apply Continuous PRN CHINTAN Garcia       • piperacillin-tazobactam (ZOSYN) 4.5 g/100 mL 0.9% NS IVPB (mbp)  4.5 g Intravenous Q8H Brad Flanagan IV, RPH   4.5 g at 02/14/17 0634   • polyethylene glycol (MIRALAX) powder 17 g  17 g Oral Daily CHINTAN Garcia   17 g at 02/14/17 0902   • probiotic (CULTURELLE) capsule 1 capsule  1 capsule Oral Daily CHINTAN Garcia   1 capsule at 02/14/17 0902   • sodium chloride 0.9 % flush 1-10 mL  1-10 mL Intravenous PRN CHINTAN Garcia       • vancomycin IVPB 1500 mg in 0.9% NaCl (Premix) 500 mL  1,500 mg Intravenous Q12H Brad Flanagan IV, RPH   1,500 mg at 02/13/17 2308       Antibiotics:  IV Anti-Infectives     Ordered     Dose/Rate Route Frequency Start Stop    02/13/17 2009  piperacillin-tazobactam (ZOSYN) 4.5 g/100 mL 0.9% NS IVPB (mbp)     Ordering Provider:  Brad Flanagan IV, RPH    4.5 g  over 0.5 Hours Intravenous Every 8 Hours 02/13/17 2200 02/13/17 2019  vancomycin IVPB 1500 mg in 0.9% NaCl (Premix) 500 mL     Ordering Provider:  Brad Flanagan IV, RPH    1,500 mg  over 90 Minutes Intravenous Every 12 Hours 02/13/17 2200 02/13/17 2002  Pharmacy to dose vancomycin     Ordering Provider:  Sarahi Ok, APRN     Does not apply Continuous PRN 02/13/17 2001              Review of Systems    Constitutional-- No Fever, chills or sweats.  Appetite good, and no malaise. No fatigue.  Heent-- No new vision, hearing or throat complaints.  No epistaxis or oral sores.   "Denies odynophagia or dysphagia.  No flashers, floaters or eye pain. No odynophagia or dysphagia. No headache, photophobia or neck stiffness.  CV-- No chest pain, palpitation or syncope  Resp-- No SOB/cough/Hemoptysis  GI- No nausea, vomiting, or diarrhea.  No hematochezia, melena, or hematemesis. Denies jaundice or chronic liver disease.  -- No dysuria, hematuria, or flank pain.  Denies hesitancy, urgency or flank pain.  Lymph- no swollen lymph nodes in neck/axilla or groin.   Heme- No active bruising or bleeding; no Hx of DVT or PE.  MS-- no acute swelling or pain in the bones or joints of arms/legs.  No new back pain.  Neuro-- No acute focal weakness or numbness in the arms or legs.  No seizures.    Full 12 point review of systems reviewed and negative otherwise for acute complaints, except for    Physical Exam:   Vital Signs   Visit Vitals   • /81 (BP Location: Right leg, Patient Position: Lying)   • Pulse 93   • Temp 97.8 °F (36.6 °C) (Oral)   • Resp 18   • Ht 75\" (190.5 cm)   • Wt 277 lb 6.4 oz (126 kg)   • BMI 34.67 kg/m2       GENERAL: Awake and alert, in no acute distress.   HEENT: Normocephalic, atraumatic.  PERRL. EOMI. No conjunctival injection. No icterus. Oropharynx clear without evidence of thrush or exudate. No evidence of peridontal disease.    NECK: Supple without nuchal rigidity. No mass.  LYMPH: No cervical, axillary or inguinal lymphadenopathy.  HEART: RRR; No murmur, rubs, gallops.   LUNGS: Clear to auscultation bilaterally without wheezing, rales, rhonchi. Normal respiratory effort. Nonlabored. No dullness.  ABDOMEN: Soft, nontender, nondistended. Positive bowel sounds. No rebound or guarding. NO mass or HSM.  EXT:  No cyanosis, clubbing or edema. No cord.  : Genitalia generally unremarkable.  Without Walker catheter.  MSK: FROM without joint effusions noted arms/legs.    SKIN: Warm and dry without cutaneous eruptions on Inspection/palpation.    NEURO: Oriented to PPT. No acute focal " deficits on motor/sensory exam at arms/legs.  Chronic sensory neuropathy  PSYCHIATRIC: Normal insight and judgement. Cooperative with PE    Left foot with vague edema and faint erythema with large wound.  No obvious purulence or malodor.  Vague slough and fatty tissue noted.  No discrete mass bulge or fluctuance.  No crepitus or bulla.    Laboratory Data      Results from last 7 days  Lab Units 02/13/17 2033   WBC 10*3/mm3 11.15*   HEMOGLOBIN g/dL 10.9*   HEMATOCRIT % 33.5*   PLATELETS 10*3/mm3 236       Results from last 7 days  Lab Units 02/13/17 2033   SODIUM mmol/L 136   POTASSIUM mmol/L 4.0   CHLORIDE mmol/L 101   TOTAL CO2 mmol/L 28.0   BUN mg/dL 14   CREATININE mg/dL 0.40*   GLUCOSE mg/dL 134*   CALCIUM mg/dL 9.5       Results from last 7 days  Lab Units 02/13/17 2033   ALK PHOS U/L 173*   BILIRUBIN mg/dL 0.4   ALT (SGPT) U/L 23   AST (SGOT) U/L 26               Estimated Creatinine Clearance: 322.6 mL/min (by C-G formula based on Cr of 0.4).      Microbiology:      Radiology:  Imaging Results (last 72 hours)     Procedure Component Value Units Date/Time    XR Chest 1 View [[id number]]      Updated:  02/13/17 2053            Impression:   --Acute/severe/deep infection involving the left foot requiring surgery on January 30 consistent with osteomyelitis surgically, associated with marginal perfusion from a gross standpoint at the time of surgery and no specific option to improve vascular flow per discussion with Dr. Martinez, and has required metatarsals 3/4/5 resection.  This is in the setting of significant comorbidity including uncontrolled diabetes.  He has a high risk for chronic functional/limb loss including nonhealing and persistent/relapse/progression of infection that could include sepsis or more proximal spread in addition to metastatic foci of involvement.  He has a risk for chronic pain and other disability associated with this.  He and his family voice understanding these risks, they have  considered the input from select and they prefer amputation at present.  There will discuss in more detail with Dr. Martinez to help outline the option/timing/threshold for amputation.  This option/timing/threshold for hip dictation will be up to Dr. Martinez in discussion with the family.  He does not seem to have progressive infection at present, albeit with substantial tissue defect and high risk for further sequela as outlined above.  He understands the risks of continued antibiotics as outlined below as well.     --Diabetes.  Type II.  You need to tightly control blood sugar to give best chance for healing     --History stage IV hepatic fibrosis and diagnosis DUNLAP    --PVD as above    PLAN: Thank you for asking us to see Kendrick Crystal, I recommend the following:    --IV vancomycin/Zosyn for now.    --I discussed potential risks and benefits of the prescribed antibiotics that include, but are not limited to, solid organ toxicity, neuro/bala/vestibular toxicity, renal toxicity, CDiff, cytopenias, hypersensitivity,  etc.. Patient/Family voice understanding and agree to proceed.    --Monitor IV and IV antibiotics with risk for systemic complication and potential drug interaction    --Check/review labs cultures and scans    --Discussed with family and patient as outlined above regarding complexities involved with decision-making and future plans    --Discussed with microbiology    --History per nursing staff as well    --I discussed with Dr. Martinez directly       Usman Dong MD  2/14/2017

## 2017-02-14 NOTE — H&P
"  [facility] Medicine Services  HISTORY AND PHYSICAL    Primary Care Physician: Juan Rios MD    Subjective     Chief Complaint: Left foot ulceration    History of Present Illness    Pt is a transfer from [facility] where he was there to further receive IV abx and wound care s/p Left 3-5th transmetatarsal amputation from admission at [facility] 1/25 - 2/3/17.    Pt here per wife, \"his foot isn't getting better and we talked to everyone, his wound care doctor, the wound care nurse, the nurses, and they gave us time to think about it. That's why we are here today because he (the pt) has thought about it and he is ready for it to come off. What we don't know is how much is going to come off. Is it just his foot? Or up to his knee? Or maybe the whole leg?\" States there is concern the wound is larger and tunneling.     According to medical records pt has been receiving Zosyn and Vanc IV since admission to [facility].     History of DUNLAP s/p liver transplant 10/3/14, uncontrolled T2DM, hypertension, hyponatremia (stable in 130s), multiple previous skin abscesses and neuropathy.    Magnesium replaced today 4G.      Review of Systems   Constitutional: Positive for activity change. Negative for appetite change, chills, diaphoresis and fever.   HENT: Negative for facial swelling, sore throat, trouble swallowing and voice change.    Eyes: Negative.  Negative for photophobia and visual disturbance.   Respiratory: Positive for cough (\"for awhile\"). Negative for chest tightness.    Cardiovascular: Negative.  Negative for chest pain and palpitations.   Gastrointestinal: Negative.  Negative for abdominal pain, blood in stool, constipation, diarrhea and nausea.   Endocrine: Negative.  Negative for polyuria.   Genitourinary: Negative.    Musculoskeletal: Positive for arthralgias, gait problem and joint swelling. Negative for neck pain and neck stiffness.   Skin: Positive for wound.   Neurological: Positive for weakness. " Negative for seizures, syncope and headaches.   Psychiatric/Behavioral: Positive for decreased concentration and sleep disturbance. Negative for agitation, behavioral problems and confusion. The patient is not nervous/anxious.       Otherwise complete ROS reviewed and negative except as mentioned in the HPI.      Past Medical History:   Past Medical History   Diagnosis Date   • Counseling for insulin pump    • Diabetes mellitus    • Encephalopathy, hepatic    • H/O degenerative disc disease    • History of Lissa's gangrene    • Hypertension    • multiple Skin abscesses    • DUNLAP, bx showed stage IV fibrosis    • Neuropathy        Past Surgical History:  Past Surgical History   Procedure Laterality Date   • Testicle surgery       DEBRIDEMENT FROM GANGRENE   • Leg surgery     • Amputation digit Left 1/30/2017     Procedure: left fourth and fifth transmetatarsal toe amputation ;  Surgeon: Juancho Martinez MD;  Location: [facility];  Service:        Family History:   Family History   Problem Relation Age of Onset   • Arthritis Mother    • Cancer Mother    • Hypertension Mother    • Kidney disease Mother    • Stroke Mother    • Heart attack Father    • Arthritis Father    • Diabetes Father    • Hyperlipidemia Father    • Mental illness Sister    • Diabetes Brother    • Hypertension Brother    • Obesity Brother      Social History:   Social History     Social History   • Marital status:      Spouse name: N/A   • Number of children: N/A   • Years of education: N/A     Occupational History   • Not on file.     Social History Main Topics   • Smoking status: Never Smoker   • Smokeless tobacco: Not on file   • Alcohol use No   • Drug use: No   • Sexual activity: Not on file     Other Topics Concern   • Not on file     Social History Narrative       Medications:  Prescriptions Prior to Admission   Medication Sig Dispense Refill Last Dose   • acetaminophen (TYLENOL) 325 MG tablet Take 2 tablets by mouth Every 6 (Six)  Hours As Needed for moderate pain (4-6) or fever (Temperature greater than 101F).  0    • aspirin 81 MG EC tablet Take 1 tablet by mouth Daily.      • atorvastatin (LIPITOR) 40 MG tablet Take 1 tablet by mouth Every Night.      • benzonatate (TESSALON) 100 MG capsule Take 1 capsule by mouth 3 (Three) Times a Day As Needed for cough.  0    • famotidine (PEPCID) 20 MG tablet Take 1 tablet by mouth 2 (Two) Times a Day.      • gabapentin (NEURONTIN) 400 MG capsule Take 3 capsules by mouth 3 (Three) Times a Day.      • insulin detemir (LEVEMIR) 100 UNIT/ML injection Inject 70 Units under the skin Daily.  12    • insulin lispro (humaLOG) 100 UNIT/ML injection Inject 20 Units under the skin 3 (Three) Times a Day With Meals.  12    • insulin lispro (humaLOG) 100 UNIT/ML injection Inject 0-9 Units under the skin 3 (Three) Times a Day With Meals.  12    • insulin lispro (humaLOG) 100 UNIT/ML injection Inject 2-7 Units under the skin At Night As Needed for high blood sugar.  12    • lisinopril (PRINIVIL,ZESTRIL) 40 MG tablet Take 1 tablet by mouth Daily.      • melatonin 5 MG sublingual tablet sublingual tablet Place 1 tablet under the tongue At Night As Needed (insomnia).  0    • piperacillin-tazobactam (ZOSYN) 4-0.5 GM/100ML solution IVPB Infuse 100 mL into a venous catheter Every 8 (Eight) Hours. Indications: Bone and Joint Infection  0    • povidone-iodine (BETADINE) 10 % ointment Apply  topically Daily. To bilateral feet ulcerations  0    • probiotic (CULTURELLE) capsule capsule Take 1 capsule by mouth Daily. 30 capsule     • Vancomycin HCl in NaCl 1.5-0.9 GM/500ML-% solution IVPB Infuse 500 mL into a venous catheter Every 12 (Twelve) Hours. Indications: Skin and Soft Tissue Infection 83212 mL       Allergies:  Allergies   Allergen Reactions   • No Known Drug Allergy        Objective     Vital Signs:   Visit Vitals   • BP (!) 184/101 (BP Location: Right arm, Patient Position: Sitting)   • Pulse 93   • Temp 97.8 °F (36.6  "°C) (Oral)   • Resp 18   • Ht 75\" (190.5 cm)   • Wt 277 lb 6.4 oz (126 kg)   • BMI 34.67 kg/m2     Physical Exam   Constitutional: He is oriented to person, place, and time. He appears well-developed and well-nourished. No distress.   HENT:   Head: Normocephalic and atraumatic.   Eyes: Conjunctivae and EOM are normal. Right eye exhibits no discharge. Left eye exhibits no discharge. No scleral icterus.   Neck: Normal range of motion. Neck supple. No tracheal deviation present.   Cardiovascular: Normal rate, regular rhythm and normal heart sounds.  Exam reveals no gallop and no friction rub.    No murmur heard.  Pulmonary/Chest: Effort normal. No respiratory distress. He has no wheezes. He has no rales. He exhibits no tenderness.   Abdominal: Soft. Bowel sounds are normal. He exhibits no distension. There is no tenderness. There is no rebound and no guarding. No hernia.   Musculoskeletal: Normal range of motion. He exhibits edema and deformity. He exhibits no tenderness.   Left foot ulceration - see picture. Provided by wife. Top picture is most recent, taken a few days ago.    Neurological: He is alert and oriented to person, place, and time.   Skin: Skin is warm and dry. No rash noted. He is not diaphoretic. No erythema. There is pallor.   Psychiatric: He has a normal mood and affect. His behavior is normal. Judgment and thought content normal.           Results Reviewed:            I have personally reviewed and interpreted the radiology studies and ECG obtained at time of admission.     Assessment / Plan      Assessment & Plan  Principal Problem:    Diabetic foot ulcer associated with diabetes mellitus due to underlying condition  Active Problems:    Type 2 diabetes mellitus with foot ulcer, with long-term current use of insulin    Essential hypertension    Hypertriglyceridemia, essential    Poorly controlled type 2 diabetes mellitus with peripheral neuropathy    DUNLAP (nonalcoholic steatohepatitis)    " Hyponatremia    Hypomagnesemia    Diabetic foot ulcer    Plan:  Consult Dr. Martinez  ID consult  Labs: cbc, cmp, blood cultures, bnp, type and screen, INR  Continue Abx: vanc, zosyn  Wound care  PT/OT  Glucose control: (lantus 30 units BID; Humalog 10units TID with meals; and SSI)  Pain control  Bowel regimen  NPO after midnight  Continue home medications  Diabetes education  CXR  Incentive spirometer  Case mgmt  Nutrition consult: increase protein diet    DVT prophylaxis: allyn/scd pending surgery consult - at [facility], pt has been receiving Lovenox 40mg nightly at 2100     I discussed the patients findings and my recommendations with: patient and his wife    Sarahi CHINTAN Euceda 02/13/17 8:33 PM      Brief Attending Note     The plan of care reviewed and developed with the advanced practice clinician (APC):   CHINTAN León.    Brief Summary Statement/HPI:   Mr. Crystal is a 46 year old  man who is known to our service from a recent admission for diabetic foot wound and left 3-5 metatarsal amputation, discharged 2/3/2017.  He was transferred to [facility] for ongoing IV antibiotics and wound care, but wound is getting bigger and the patient experiences low grade temps and leukocytosis (12K) today.  There is concern he may have some osteomyelitis at this point and that he may require amputation.  The patient does not have LE pain. He does admit to walking some on the foot.  He has had some persistent dry cough and leg edema.      Attending Physical Exam:  Temp:  [97.8 °F (36.6 °C)] 97.8 °F (36.6 °C)  Heart Rate:  [93-96] 93  Resp:  [18] 18  BP: (179-184)/() 184/101     Constitutional: no acute distress, awake, alert.  Edentulous  Eyes: PERRLA, sclerae anicteric, no conjunctival injection  Neck: supple, no thyromegaly, trachea midline  Respiratory: Clear to auscultation bilaterally, nonlabored respirations   Cardiovascular: RRR, no murmurs, rubs, or gallops, palpable pedal pulses  bilaterally  Gastrointestinal: Positive bowel sounds, soft, nontender, nondistended  Musculoskeletal:left 3-5 MT amputation with large lateral foot wound, dressed with w-d gauze.  The wound is deep and appears to tunnel.  Obvious fascia exposed.  Psychiatric: oriented x 3, appropriate affect, cooperative  Neurologic: Strength symmetric in all extremities, Cranial Nerves grossly intact to confrontation         Brief Assessment/Plan :  Continue IV antibiotics  Consult Dr. Fischer, who saw the patient last admission  Consult ID for help with IV antibiotic management, though ultimately amputation will likely be required.  Diabetes management    See above for further detailed assessment and plan developed with APC which I have reviewed and/or edited.    I believe this patient requires LEXINPT: Nonhealing, worsening diabetic foot wound despite maximal IV antibiotic therapy and wound care requiring specialty evaluation and likely amputation    Sarahi Euceda, CHINTAN 02/13/17 8:33 PM

## 2017-02-14 NOTE — PROGRESS NOTES
Discharge Planning Assessment  [facility]     Patient Name: Kendrick rCystal  MRN: [id number]  Today's Date: 2/14/2017    Admit Date: 2/13/2017          Discharge Needs Assessment       02/14/17 1212    Living Environment    Lives With spouse    Living Arrangements apartment    Provides Primary Care For no one, unable/limited ability to care for self    Primary Care Provided By spouse/significant other    Quality Of Family Relationships supportive    Able to Return to Prior Living Arrangements yes    Living Arrangement Comments Lives with wife in a one story apartment (with 6 entry steps) in [facility].  Patient has recenlty been at [facility] and [facility].    Discharge Needs Assessment    Concerns To Be Addressed adjustment to diagnosis/illness concerns;discharge planning concerns    Anticipated Changes Related to Illness inability to care for self    Equipment Currently Used at Home cane, straight;commode    Equipment Needed After Discharge lift device;power chair   patient and wife concerned he may need a lift chair and a motorized wheelchair if he has amputation    Discharge Facility/Level Of Care Needs LTACH (long term acute care hospital)   possible LTAC    Transportation Available family or friend will provide            Discharge Plan       02/14/17 1217    Case Management/Social Work Plan    Additional Comments Spoke with patient and wife (Cindy) at bedside.  He lives with her in an apartment in [facility].  Apartment is on main floor of a large home.  There are 6-7 entry steps.  Wife can help care for him but she is concerned that she may no longer be able to lift/assist with transfers.  He has a straight cane and a bedside commode at home.  He can get a wheelchair from a friend.  He is inquiring about possibly needing a lift chair and a motorized wheelchair when discharged.  Denies HH.  Has no preference in HH agency if needed.  PCP=Dr. Rios.  Patient stated he has Rx drug coverage.  Patient and wife  agree goal is to return home or if LTAC is needed, he prefers that referral be made to [facility].  He does NOT want to return to [facility].  If he is discharged home, he will be residing with his wife at [address] [place] [address]. [address], [place] [address].  Her name is Cindy:  [phone].  He has no preference in home health agency if needed.  CM will follow and assess DC needs again after surgery if he proceeds with surgery.          Discharge Placement     No information found                Demographic Summary       02/14/17 1211    Referral Information    Admission Type inpatient    Arrived From other (see comments)   [facility]     Contact Information    Permission Granted to Share Information With     Primary Care Physician Information    Name Juan Rios            Functional Status     None            Psychosocial     None            Abuse/Neglect     None            Legal     None            Substance Abuse     None            Patient Forms     None          Princess Odonnell

## 2017-02-14 NOTE — PAYOR COMM NOTE
"Elliot Crystal (46 y.o. Male)     INITIAL NOTIFICATION AND CLINICALS      Date of Birth Social Security Number Address Home Phone MRN    1970  [address] [place] [id number] [phone] [id number]    Shinto Marital Status          Roman Catholic        Admission Date Admission Type Admitting Provider Attending Provider Department, Room/Bed    2/13/17 Elective Sima Zarate MD Beck, Jennifer L, MD [address], S454/1    Discharge Date Discharge Disposition Discharge Destination                      Attending Provider: Sima Zarate MD     Allergies:  No Known Drug Allergy    Isolation:  None   Infection:  None   Code Status:  FULL    Ht:  75\" (190.5 cm)   Wt:  277 lb 6.4 oz (126 kg)    Admission Cmt:  None   Principal Problem:  Diabetic foot ulcer associated with diabetes mellitus due to underlying condition [E08.621,L97.509]                 Active Insurance as of 2/13/2017     Primary Coverage     Payor Plan Insurance Group Employer/Plan Group    HUMANA MEDICAID HU[employer]NA [employer] [id number]     Payor Plan Address Payor Plan Phone Number Effective From Effective To    PO  [phone] 1/18/2017     [place] [address]       Subscriber Name Subscriber Birth Date Member ID       ELLIOT CRYSTAL 1970 [id number]                 Emergency Contacts      (Rel.) Home Phone Work Phone Mobile Phone    Cindy Crystal (Spouse) [phone] -- [phone]            Insurance Information                HU[employer]NA MEDICAID/HUMANA [employer] Phone: [phone]    Subscriber: Elliot Crystal Subscriber#: [id number]    Group#: [id number] Precert#:              History & Physical      Sima Zarate MD at 2/13/2017  7:10 PM            Psychiatric Medicine Services  HISTORY AND PHYSICAL    Primary Care Physician: Juan Rios MD    Subjective     Chief Complaint: Left foot ulceration    History of Present Illness    Pt is a transfer from [facility] where he " "was there to further receive IV abx and wound care s/p Left 3-5th transmetatarsal amputation from admission at [facility] 1/25 - 2/3/17.    Pt here per wife, \"his foot isn't getting better and we talked to everyone, his wound care doctor, the wound care nurse, the nurses, and they gave us time to think about it. That's why we are here today because he (the pt) has thought about it and he is ready for it to come off. What we don't know is how much is going to come off. Is it just his foot? Or up to his knee? Or maybe the whole leg?\" States there is concern the wound is larger and tunneling.     According to medical records pt has been receiving Zosyn and Vanc IV since admission to [facility].     History of DUNLAP s/p liver transplant 10/3/14, uncontrolled T2DM, hypertension, hyponatremia (stable in 130s), multiple previous skin abscesses and neuropathy.    Magnesium replaced today 4G.      Review of Systems   Constitutional: Positive for activity change. Negative for appetite change, chills, diaphoresis and fever.   HENT: Negative for facial swelling, sore throat, trouble swallowing and voice change.    Eyes: Negative.  Negative for photophobia and visual disturbance.   Respiratory: Positive for cough (\"for awhile\"). Negative for chest tightness.    Cardiovascular: Negative.  Negative for chest pain and palpitations.   Gastrointestinal: Negative.  Negative for abdominal pain, blood in stool, constipation, diarrhea and nausea.   Endocrine: Negative.  Negative for polyuria.   Genitourinary: Negative.    Musculoskeletal: Positive for arthralgias, gait problem and joint swelling. Negative for neck pain and neck stiffness.   Skin: Positive for wound.   Neurological: Positive for weakness. Negative for seizures, syncope and headaches.   Psychiatric/Behavioral: Positive for decreased concentration and sleep disturbance. Negative for agitation, behavioral problems and confusion. The patient is not nervous/anxious.       Otherwise " complete ROS reviewed and negative except as mentioned in the HPI.      Past Medical History:   Past Medical History   Diagnosis Date   • Counseling for insulin pump    • Diabetes mellitus    • Encephalopathy, hepatic    • H/O degenerative disc disease    • History of Lissa's gangrene    • Hypertension    • multiple Skin abscesses    • DUNLAP, bx showed stage IV fibrosis    • Neuropathy        Past Surgical History:  Past Surgical History   Procedure Laterality Date   • Testicle surgery       DEBRIDEMENT FROM GANGRENE   • Leg surgery     • Amputation digit Left 1/30/2017     Procedure: left fourth and fifth transmetatarsal toe amputation ;  Surgeon: Juancho Martinez MD;  Location: [facility];  Service:        Family History:   Family History   Problem Relation Age of Onset   • Arthritis Mother    • Cancer Mother    • Hypertension Mother    • Kidney disease Mother    • Stroke Mother    • Heart attack Father    • Arthritis Father    • Diabetes Father    • Hyperlipidemia Father    • Mental illness Sister    • Diabetes Brother    • Hypertension Brother    • Obesity Brother      Social History:   Social History     Social History   • Marital status:      Spouse name: N/A   • Number of children: N/A   • Years of education: N/A     Occupational History   • Not on file.     Social History Main Topics   • Smoking status: Never Smoker   • Smokeless tobacco: Not on file   • Alcohol use No   • Drug use: No   • Sexual activity: Not on file     Other Topics Concern   • Not on file     Social History Narrative       Medications:  Prescriptions Prior to Admission   Medication Sig Dispense Refill Last Dose   • acetaminophen (TYLENOL) 325 MG tablet Take 2 tablets by mouth Every 6 (Six) Hours As Needed for moderate pain (4-6) or fever (Temperature greater than 101F).  0    • aspirin 81 MG EC tablet Take 1 tablet by mouth Daily.      • atorvastatin (LIPITOR) 40 MG tablet Take 1 tablet by mouth Every Night.      • benzonatate  "(TESSALON) 100 MG capsule Take 1 capsule by mouth 3 (Three) Times a Day As Needed for cough.  0    • famotidine (PEPCID) 20 MG tablet Take 1 tablet by mouth 2 (Two) Times a Day.      • gabapentin (NEURONTIN) 400 MG capsule Take 3 capsules by mouth 3 (Three) Times a Day.      • insulin detemir (LEVEMIR) 100 UNIT/ML injection Inject 70 Units under the skin Daily.  12    • insulin lispro (humaLOG) 100 UNIT/ML injection Inject 20 Units under the skin 3 (Three) Times a Day With Meals.  12    • insulin lispro (humaLOG) 100 UNIT/ML injection Inject 0-9 Units under the skin 3 (Three) Times a Day With Meals.  12    • insulin lispro (humaLOG) 100 UNIT/ML injection Inject 2-7 Units under the skin At Night As Needed for high blood sugar.  12    • lisinopril (PRINIVIL,ZESTRIL) 40 MG tablet Take 1 tablet by mouth Daily.      • melatonin 5 MG sublingual tablet sublingual tablet Place 1 tablet under the tongue At Night As Needed (insomnia).  0    • piperacillin-tazobactam (ZOSYN) 4-0.5 GM/100ML solution IVPB Infuse 100 mL into a venous catheter Every 8 (Eight) Hours. Indications: Bone and Joint Infection  0    • povidone-iodine (BETADINE) 10 % ointment Apply  topically Daily. To bilateral feet ulcerations  0    • probiotic (CULTURELLE) capsule capsule Take 1 capsule by mouth Daily. 30 capsule     • Vancomycin HCl in NaCl 1.5-0.9 GM/500ML-% solution IVPB Infuse 500 mL into a venous catheter Every 12 (Twelve) Hours. Indications: Skin and Soft Tissue Infection 42640 mL       Allergies:  Allergies   Allergen Reactions   • No Known Drug Allergy        Objective     Vital Signs:   Visit Vitals   • BP (!) 184/101 (BP Location: Right arm, Patient Position: Sitting)   • Pulse 93   • Temp 97.8 °F (36.6 °C) (Oral)   • Resp 18   • Ht 75\" (190.5 cm)   • Wt 277 lb 6.4 oz (126 kg)   • BMI 34.67 kg/m2     Physical Exam   Constitutional: He is oriented to person, place, and time. He appears well-developed and well-nourished. No distress.   HENT: "   Head: Normocephalic and atraumatic.   Eyes: Conjunctivae and EOM are normal. Right eye exhibits no discharge. Left eye exhibits no discharge. No scleral icterus.   Neck: Normal range of motion. Neck supple. No tracheal deviation present.   Cardiovascular: Normal rate, regular rhythm and normal heart sounds.  Exam reveals no gallop and no friction rub.    No murmur heard.  Pulmonary/Chest: Effort normal. No respiratory distress. He has no wheezes. He has no rales. He exhibits no tenderness.   Abdominal: Soft. Bowel sounds are normal. He exhibits no distension. There is no tenderness. There is no rebound and no guarding. No hernia.   Musculoskeletal: Normal range of motion. He exhibits edema and deformity. He exhibits no tenderness.   Left foot ulceration - see picture. Provided by wife. Top picture is most recent, taken a few days ago.    Neurological: He is alert and oriented to person, place, and time.   Skin: Skin is warm and dry. No rash noted. He is not diaphoretic. No erythema. There is pallor.   Psychiatric: He has a normal mood and affect. His behavior is normal. Judgment and thought content normal.           Results Reviewed:            I have personally reviewed and interpreted the radiology studies and ECG obtained at time of admission.     Assessment / Plan      Assessment & Plan  Principal Problem:    Diabetic foot ulcer associated with diabetes mellitus due to underlying condition  Active Problems:    Type 2 diabetes mellitus with foot ulcer, with long-term current use of insulin    Essential hypertension    Hypertriglyceridemia, essential    Poorly controlled type 2 diabetes mellitus with peripheral neuropathy    DUNLAP (nonalcoholic steatohepatitis)    Hyponatremia    Hypomagnesemia    Diabetic foot ulcer    Plan:  Consult Dr. Martinez  ID consult  Labs: cbc, cmp, blood cultures, bnp, type and screen, INR  Continue Abx: vanc, zosyn  Wound care  PT/OT  Glucose control: (lantus 30 units BID; Humalog  10units TID with meals; and SSI)  Pain control  Bowel regimen  NPO after midnight  Continue home medications  Diabetes education  CXR  Incentive spirometer  Case mgmt  Nutrition consult: increase protein diet    DVT prophylaxis: allyn/scd pending surgery consult - at [facility], pt has been receiving Lovenox 40mg nightly at 2100     I discussed the patients findings and my recommendations with: patient and his wife    Sarahi KingCHINTAN alston 02/13/17 8:33 PM      Brief Attending Note     The plan of care reviewed and developed with the advanced practice clinician (APC):   CHINTAN León.    Brief Summary Statement/HPI:   Mr. Crystal is a 46 year old  man who is known to our service from a recent admission for diabetic foot wound and left 3-5 metatarsal amputation, discharged 2/3/2017.  He was transferred to [facility] for ongoing IV antibiotics and wound care, but wound is getting bigger and the patient experiences low grade temps and leukocytosis (12K) today.  There is concern he may have some osteomyelitis at this point and that he may require amputation.  The patient does not have LE pain. He does admit to walking some on the foot.  He has had some persistent dry cough and leg edema.      Attending Physical Exam:  Temp:  [97.8 °F (36.6 °C)] 97.8 °F (36.6 °C)  Heart Rate:  [93-96] 93  Resp:  [18] 18  BP: (179-184)/() 184/101     Constitutional: no acute distress, awake, alert.  Edentulous  Eyes: PERRLA, sclerae anicteric, no conjunctival injection  Neck: supple, no thyromegaly, trachea midline  Respiratory: Clear to auscultation bilaterally, nonlabored respirations   Cardiovascular: RRR, no murmurs, rubs, or gallops, palpable pedal pulses bilaterally  Gastrointestinal: Positive bowel sounds, soft, nontender, nondistended  Musculoskeletal:left 3-5 MT amputation with large lateral foot wound, dressed with w-d gauze.  The wound is deep and appears to tunnel.  Obvious fascia exposed.  Psychiatric: oriented x 3,  appropriate affect, cooperative  Neurologic: Strength symmetric in all extremities, Cranial Nerves grossly intact to confrontation         Brief Assessment/Plan :  Continue IV antibiotics  Consult Dr. Fischer, who saw the patient last admission  Consult ID for help with IV antibiotic management, though ultimately amputation will likely be required.  Diabetes management    See above for further detailed assessment and plan developed with APC which I have reviewed and/or edited.    I believe this patient requires LEXINPT: Nonhealing, worsening diabetic foot wound despite maximal IV antibiotic therapy and wound care requiring specialty evaluation and likely amputation    Sarahi Euceda, APRN 02/13/17 8:33 PM           Electronically signed by Sima Zarate MD at 2/13/2017  9:07 PM        Emergency Department Notes     No notes of this type exist for this encounter.        Hospital Medications (active)       Dose Frequency Start End    acetaminophen (TYLENOL) tablet 650 mg 650 mg Every 6 Hours PRN 2/13/2017     Sig - Route: Take 2 tablets by mouth Every 6 (Six) Hours As Needed for mild pain (1-3). - Oral    dextrose (D50W) solution 25 g 25 g Every 15 Minutes PRN 2/13/2017     Sig - Route: Infuse 50 mL into a venous catheter Every 15 (Fifteen) Minutes As Needed for low blood sugar (Blood Sugar Less Than 70, Patient Has IV Access - Unresponsive, NPO or Unable To Safely Swallow). - Intravenous    dextrose (GLUTOSE) oral gel 15 g 15 g Every 15 Minutes PRN 2/13/2017     Sig - Route: Take 15 g by mouth Every 15 (Fifteen) Minutes As Needed for low blood sugar (Blood Sugar Less Than 70, Patient Alert, Is Not NPO & Can Safely Swallow). - Oral    docusate sodium (COLACE) capsule 100 mg 100 mg 2 Times Daily 2/13/2017     Sig - Route: Take 1 capsule by mouth 2 (Two) Times a Day. - Oral    glucagon (GLUCAGEN) injection 1 mg 1 mg Every 15 Minutes PRN 2/13/2017     Sig - Route: Inject 1 mg under the skin Every 15 (Fifteen) Minutes  As Needed (Blood Glucose Less Than 70 - Patient Without IV Access - Unresponsive, NPO or Unable To Safely Swallow). - Subcutaneous    insulin detemir (LEVEMIR) injection 30 Units 30 Units Every 12 Hours Scheduled 2/13/2017     Sig - Route: Inject 30 Units under the skin Every 12 (Twelve) Hours. - Subcutaneous    insulin lispro (humaLOG) injection 10 Units 10 Units 3 Times Daily With Meals 2/14/2017     Sig - Route: Inject 10 Units under the skin 3 (Three) Times a Day With Meals. - Subcutaneous    insulin lispro (humaLOG) injection 2-7 Units 2-7 Units 4 Times Daily Before Meals & Nightly 2/13/2017     Sig - Route: Inject 2-7 Units under the skin 4 (Four) Times a Day Before Meals & at Bedtime. - Subcutaneous    Pharmacy to dose vancomycin  Continuous PRN 2/13/2017     Sig - Route: Continuous As Needed for consult. - Does not apply    piperacillin-tazobactam (ZOSYN) 4.5 g/100 mL 0.9% NS IVPB (mbp) 4.5 g Every 8 Hours 2/13/2017     Sig - Route: Infuse 100 mL into a venous catheter Every 8 (Eight) Hours. - Intravenous    polyethylene glycol (MIRALAX) powder 17 g 17 g Daily 2/14/2017     Sig - Route: Take 17 g by mouth Daily. - Oral    sodium chloride 0.9 % flush 1-10 mL 1-10 mL As Needed 2/13/2017     Sig - Route: Infuse 1-10 mL into a venous catheter As Needed for line care. - Intravenous    vancomycin IVPB 1500 mg in 0.9% NaCl (Premix) 500 mL 1,500 mg Every 12 Hours 2/13/2017     Sig - Route: Infuse 500 mL into a venous catheter Every 12 (Twelve) Hours. - Intravenous    piperacillin-tazobactam (ZOSYN) 3.375 g in dextrose 50 mL IVPB (premix) (Discontinued) 3.375 g Every 6 Hours 2/13/2017 2/13/2017    Sig - Route: Infuse 50 mL into a venous catheter Every 6 (Six) Hours. - Intravenous    Reason for Discontinue: Dose adjustment    vancomycin 2000 mg/500 mL 0.9% NS IVPB (BHS) (Discontinued) 2,000 mg Once 2/13/2017 2/13/2017    Sig - Route: Infuse 500 mL into a venous catheter 1 (One) Time. - Intravenous    Reason for  Discontinue: Dose adjustment    vancomycin IVPB 1500 mg in 0.9% NaCl (Premix) 500 mL (Discontinued) 1,500 mg Every 12 Hours 2/14/2017 2/13/2017    Sig - Route: Infuse 500 mL into a venous catheter Every 12 (Twelve) Hours. - Intravenous            Lab Results (last 24 hours)     Procedure Component Value Units Date/Time    CBC & Differential [[id number]] Collected:  02/13/17 2033    Specimen:  Blood Updated:  02/13/17 2051    Narrative:       The following orders were created for panel order CBC & Differential.  Procedure                               Abnormality         Status                     ---------                               -----------         ------                     CBC Auto Differential[[id number]]         Abnormal            Final result                 Please view results for these tests on the individual orders.    CBC Auto Differential [[id number]]  (Abnormal) Collected:  02/13/17 2033    Specimen:  Blood Updated:  02/13/17 2051     WBC 11.15 (H) 10*3/mm3      RBC 3.87 (L) 10*6/mm3      Hemoglobin 10.9 (L) g/dL      Hematocrit 33.5 (L) %      MCV 86.6 fL      MCH 28.2 pg      MCHC 32.5 g/dL      RDW 14.0 %      RDW-SD 43.8 fl      MPV 10.0 fL      Platelets 236 10*3/mm3      Neutrophil % 67.8 %      Lymphocyte % 15.1 (L) %      Monocyte % 7.7 %      Eosinophil % 8.8 (H) %      Basophil % 0.4 %      Immature Grans % 0.2 %      Neutrophils, Absolute 7.57 10*3/mm3      Lymphocytes, Absolute 1.68 10*3/mm3      Monocytes, Absolute 0.86 10*3/mm3      Eosinophils, Absolute 0.98 (H) 10*3/mm3      Basophils, Absolute 0.04 10*3/mm3      Immature Grans, Absolute 0.02 10*3/mm3     POC Glucose Fingerstick [[id number]]  (Abnormal) Collected:  02/13/17 2055    Specimen:  Blood Updated:  02/13/17 2100     Glucose 140 (H) mg/dL     Narrative:       Meter: [id number] : [id number] Jean-Pierre Hernandez    Blood Culture [[id number]] Collected:  02/13/17 2034    Specimen:  Blood from Hand, Left Updated:  02/13/17 2102    Blood  Culture [[id number]] Collected:  02/13/17 2034    Specimen:  Blood from Arm, Left Updated:  02/13/17 2103    Comprehensive Metabolic Panel [[id number]]  (Abnormal) Collected:  02/13/17 2033    Specimen:  Blood Updated:  02/13/17 2107     Glucose 134 (H) mg/dL      BUN 14 mg/dL      Creatinine 0.40 (L) mg/dL      Sodium 136 mmol/L      Potassium 4.0 mmol/L      Chloride 101 mmol/L      CO2 28.0 mmol/L      Calcium 9.5 mg/dL      Total Protein 6.8 g/dL      Albumin 3.60 g/dL      ALT (SGPT) 23 U/L      AST (SGOT) 26 U/L      Alkaline Phosphatase 173 (H) U/L      Total Bilirubin 0.4 mg/dL      eGFR Non African Amer >150 mL/min/1.73      Globulin 3.2 gm/dL      A/G Ratio 1.1 (L) g/dL      BUN/Creatinine Ratio 35.0 (H)      Anion Gap 7.0 mmol/L     Narrative:       National Kidney Foundation Guidelines    Stage                           Description                             GFR                      1                               Normal or High                          90+  2                               Mild decrease                            60-89  3                               Moderate decrease                   30-59  4                               Severe decrease                       15-29  5                               Kidney failure                             <15    Magnesium [[id number]]  (Normal) Collected:  02/13/17 2033    Specimen:  Blood Updated:  02/13/17 2107     Magnesium 1.8 mg/dL     Phosphorus [[id number]]  (Normal) Collected:  02/13/17 2033    Specimen:  Blood Updated:  02/13/17 2107     Phosphorus 4.0 mg/dL     Protime-INR [[id number]] Collected:  02/13/17 2033    Specimen:  Blood Updated:  02/13/17 2107     Protime 11.4 Seconds      INR 1.04     Narrative:       Therapeutic Ranges for INR: 2.0-3.0 (PT 20-30)                              2.5-3.5 (PT 25-34)    BNP [[id number]]  (Normal) Collected:  02/13/17 2033    Specimen:  Blood Updated:  02/13/17 2113     BNP 70.0 pg/mL         Imaging Results  (last 24 hours)     Procedure Component Value Units Date/Time    XR Chest 1 View [[id number]]      Updated:  02/13/17 2053          Operative/Procedure Notes (last 24 hours) (Notes from 2/12/2017  9:19 PM through 2/13/2017  9:19 PM)     No notes of this type exist for this encounter.        Physician Progress Notes (last 24 hours) (Notes from 2/12/2017  9:19 PM through 2/13/2017  9:19 PM)     No notes of this type exist for this encounter.           Consult Notes (last 24 hours) (Notes from 2/12/2017  9:19 PM through 2/13/2017  9:19 PM)      Brad Flanagan IV, [facility] at 2/13/2017  8:13 PM  Version 2 of 2         Pharmacokinetic Consult - Vancomycin Initiation    Pharmacy consulted to manage vancomycin therapy for this 47 yo male with complicated skin and soft tissue infection s/p L 4th/5th toe amputation 1/30/17.     Objective  Lab Results   Component Value Date    CREATININE 0.60 02/03/2017     Estimated Creatinine Clearance: 215.1 mL/min (by C-G formula based on Cr of 0.6).     Wt Readings from Last 3 Encounters:   02/13/17 277 lb 6.4 oz (126 kg)   02/03/17 262 lb 12.8 oz (119 kg)   01/25/17 244 lb (111 kg)     Lab Results   Component Value Date    WBC 10.83 (H) 02/03/2017     Assessment/Plan    Pharmacy previously managed vancomycin therapy for Mr. Crystal last month. Trough of 11.4 mcg/mL on 2/2/17from a maintenance regimen of 1500 mg vancomycin IV q12h     Will load patient with 2000 mg IV vancomycin tonight then begin maintenance therapy tomorrow at noon with 1500 mg IV q12h. Will check serum vancomycin level Wednesday prior to 4th dose at noon. Pharmacy will continue to follow closely and adjust therapy as needed for target trough 10-15 mcg/mL.    Thank you for this consult.  Brad Flanagan IV, PharmD, BCPS  2/13/2017  8:10 PM    Addendum:    Discussed with CHINTAN Garcia - patient has been receiving vancomycin at outside facility, maintained on regimen started at [facility] earlier this month. Last dose  from MAR this morning ~0900. Cancel 2000 mg IV loading dose, resume 1500 mg IV q12h tonight and assess a level tomorrow morning to ensure kinetics.     Thanks  Brad Flanagan IV, PharmD, BCPS  2/13/2017  8:21 PM         Electronically signed by Brad Flanagan IV, RPH at 2/13/2017  8:21 PM      Brad Flanagan IV, RPH at 2/13/2017  8:13 PM  Version 1 of 2         Pharmacokinetic Consult - Vancomycin Initiation    Pharmacy consulted to manage vancomycin therapy for this 45 yo male with complicated skin and soft tissue infection s/p L 4th/5th toe amputation 1/30/17.     Objective  Lab Results   Component Value Date    CREATININE 0.60 02/03/2017     Estimated Creatinine Clearance: 215.1 mL/min (by C-G formula based on Cr of 0.6).     Wt Readings from Last 3 Encounters:   02/13/17 277 lb 6.4 oz (126 kg)   02/03/17 262 lb 12.8 oz (119 kg)   01/25/17 244 lb (111 kg)     Lab Results   Component Value Date    WBC 10.83 (H) 02/03/2017     Assessment/Plan    Pharmacy previously managed vancomycin therapy for Mr. Crystal last month. Trough of 11.4 mcg/mL on 2/2/17from a maintenance regimen of 1500 mg vancomycin IV q12h     Will load patient with 2000 mg IV vancomycin tonight then begin maintenance therapy tomorrow at noon with 1500 mg IV q12h. Will check serum vancomycin level Wednesday prior to 4th dose at noon. Pharmacy will continue to follow closely and adjust therapy as needed for target trough 10-15 mcg/mL.    Thank you for this consult.  Brad Flanagan IV, PharmD, BCPS  2/13/2017  8:10 PM         Electronically signed by Brad Flanagan IV, RPH at 2/13/2017  8:13 PM

## 2017-02-14 NOTE — PLAN OF CARE
Problem: Patient Care Overview (Adult)  Goal: Plan of Care Review  Outcome: Ongoing (interventions implemented as appropriate)    02/14/17 1806   Coping/Psychosocial Response Interventions   Plan Of Care Reviewed With patient;spouse   Patient Care Overview   Progress progress towards functional goals is fair   Outcome Evaluation   Outcome Summary/Follow up Plan Adm w/ cellulitis/infect. L foot s/p transmet. amp. MT's 3--5 on 1-30, for sx.consult re: possible higher level amp.; presents w/ evolving sympt. incl. hypomag, signif.elev BP > 170/90's, elev HR  W/ mobil., & signif weakness/neuropathy/inability to maintain NWB L LE consistently, posing fall risk; pt prefers std wx vs. rolling knee wx during trial of @ this session;will benefit from IPPT for strengthening & prog mobil. trng; recommend return to [facility], to resume rehab s/p d/c (considering addnl. amputation)          Problem: Inpatient Physical Therapy  Goal: Bed Mobility Goal LTG- PT  Outcome: Ongoing (interventions implemented as appropriate)    02/14/17 1806   Bed Mobility PT LTG   Bed Mobility PT LTG, Date Established 02/14/17   Bed Mobility PT LTG, Time to Achieve 1 wk   Bed Mobility PT LTG, Activity Type all bed mobility   Bed Mobility PT LTG, [place] Level independent       Goal: Transfer Training Goal 1 LTG- PT  Outcome: Ongoing (interventions implemented as appropriate)    02/14/17 1806   Transfer Training PT LTG   Transfer Training PT LTG, Date Established 02/14/17   Transfer Training PT LTG, Time to Achieve 1 wk   Transfer Training PT LTG, Activity Type bed to chair /chair to bed;sit to stand/stand to sit   Transfer Training PT LTG, [place] Level contact guard assist   Transfer Training PT LTG, Assist Device walker, standard  (NWB L LE; STABLE VS)       Goal: Gait Training Goal LTG- PT  Outcome: Ongoing (interventions implemented as appropriate)    02/14/17 1806   Gait Training PT LTG   Gait Training Goal PT LTG, Date  Established 02/14/17   Gait Training Goal PT LTG, Time to Achieve 1 wk   Gait Training Goal PT LTG, [place] Level contact guard assist   Gait Training Goal PT LTG, Assist Device walker, standard  (NWB L LE, W/ stable VS)   Gait Training Goal PT LTG, Distance to Achieve 25

## 2017-02-14 NOTE — PROGRESS NOTES
Pharmacy Consult--Antimicrobial Dosing  Pt is a 48yo M on vancomycin for the treatment of a complicated SSTI, possible osteomyelitis s/p L 4th/5th toe amputation. Patient had been receiving vancomycin and Zosyn at an outside facility prior to admission.  Pharmacy consulted to manage vancomycin therapy.      Current Antimicrobial Therapy  1. Zosyn 4.5g IV q8h  2. Vancomycin 1500mg IV q12h      No known drug allergy    Labs      Results from last 7 days     Lab Units 02/13/17 2033   SODIUM mmol/L 136   POTASSIUM mmol/L 4.0   CHLORIDE mmol/L 101   TOTAL CO2 mmol/L 28.0   BUN mg/dL 14   CREATININE mg/dL 0.40*   CALCIUM mg/dL 9.5   BILIRUBIN mg/dL 0.4   ALK PHOS U/L 173*   ALT (SGPT) U/L 23   AST (SGOT) U/L 26   GLUCOSE mg/dL 134*       Results from last 7 days     Lab Units 02/13/17 2033   WBC 10*3/mm3 11.15*   HEMOGLOBIN g/dL 10.9*   HEMATOCRIT % 33.5*   PLATELETS 10*3/mm3 236       Evaluation of Dosing  Weight: 126 kg  Goal Trough: 15-20 mcg/mL    Estimated Creatinine Clearance: 322.6 mL/min (by C-G formula based on Cr of 0.4).      Microbiology and Radiology  BLOOD CULTURE   Date Value Ref Range Status   02/13/2017 No growth at less than 24 hours  Preliminary   02/13/2017 No growth at less than 24 hours  Preliminary       Evaluation of Level    Lab Results   Component Value Date    [facility] 10.00 02/14/2017       Assessment/Plan:  Vancomycin level appropriately drawn returned subtherapeutic for indication at 10 mcg/mL.  1. Will increase dose to 1750mg IV q12h and order another vancomycin trough on 2/16 @ 0830  2. Will assess level to ensure appropriate clearance.  3. Pharmacy will continue to follow patient's clinical progress daily and monitor renal function and adjust dose as needed.    Thanks,  Zack Holcomb, PharmD  2/14/2017  10:15 AM

## 2017-02-14 NOTE — PROGRESS NOTES
Cardiothoracic Surgery Progress Note         LOS: 1 day      Subjective:  Status post left 5/4/3 transmetatarsal amputation 1/30/17.  Patient is frustrated with wound and has been contemplating further amputation.        Objective:  Vital Signs  Temp:  [97.8 °F (36.6 °C)-98.2 °F (36.8 °C)] 97.8 °F (36.6 °C)  Heart Rate:  [] 98  Resp:  [18] 18  BP: (160-184)/() 169/97    Physical Exam:   General Appearance: alert, appears stated age and cooperative   Lungs: clear to auscultation, respirations regular, respirations even and respirations unlabored   Heart: regular rhythm & normal rate, normal S1, S2 and no murmur, no clau, no rub   Skin: Left foot wound with no surrounding erythema.  No drainage from wound.  Skin changes on 2nd toe with dry eschar along medial surface.       Results:    Results from last 7 days  Lab Units 02/13/17 2033   WBC 10*3/mm3 11.15*   HEMOGLOBIN g/dL 10.9*   HEMATOCRIT % 33.5*   PLATELETS 10*3/mm3 236       Results from last 7 days  Lab Units 02/13/17 2033   SODIUM mmol/L 136   POTASSIUM mmol/L 4.0   CHLORIDE mmol/L 101   TOTAL CO2 mmol/L 28.0   BUN mg/dL 14   CREATININE mg/dL 0.40*   GLUCOSE mg/dL 134*   CALCIUM mg/dL 9.5         Assessment:  Left lower extremity wound status post 5/4/3 transmetatarsal amputation.      Plan:  -I recommended vac dressing placement and continued local wound care.    -The patient is concerned about possible future need for amputation and is questioning putting in time with local wound care and prolonged antibiotics if he will ultimately need an AKA in the future.    -Will continue ABX as per ID    Juancho Martinez MD  02/14/17  1:03 PM

## 2017-02-14 NOTE — CONSULTS
"Diabetes Education    Patient Name:  Kendrick Crystal  YOB: 1970  MRN: [id number]  Admit Date:  2/13/2017        10 minutes. Attempted to see pt for diabetes education. Patient and pt's wife declined education. Pt's wife states \"we already seen a diabetes educator\". Patient was seen by [facility] diabetes education on Jan. 27, 2017. Briefly revived with pt and pt's wife the importance of well controlled BG to promote healing. Educational material on diabetes self-management left at pt's bedside. Pt was encouraged to follow-up with PCP.       Electronically signed by:  Odalis Sahni RN  02/14/17 10:53 AM  "

## 2017-02-14 NOTE — PROGRESS NOTES
Acute Care - Physical Therapy Initial Evaluation   Young     Patient Name: Kendrick Crystal  : 1970  MRN: [id number]  Today's Date: 2017   Onset of Illness/Injury or Date of Surgery Date: 17  Date of Referral to PT: 17  Referring Physician: CHINTAN Osullivan      Admit Date: 2017     Visit Dx:    ICD-10-CM ICD-9-CM   1. Impaired functional mobility, balance, gait, and endurance Z74.09 V49.89     Patient Active Problem List   Diagnosis   • Cirrhosis   • Essential hypertension   • Non-compliance   • Type 2 diabetes mellitus with hyperosmolarity without nonketotic hyperglycemic-hyperosmolar coma (nkhhc)   • Arthritis   • Hyperlipemia   • Hypertriglyceridemia, essential   • Diabetic foot ulcer associated with diabetes mellitus due to underlying condition   • Cellulitis of left foot   • Poorly controlled type 2 diabetes mellitus with peripheral neuropathy   • Type 2 diabetes mellitus with foot ulcer, with long-term current use of insulin   • DUNLAP (nonalcoholic steatohepatitis)   • Hyponatremia   • Neutrophilic leukocytosis   • Hypomagnesemia   • Diabetic foot ulcer     Past Medical History   Diagnosis Date   • Counseling for insulin pump    • Diabetes mellitus    • Encephalopathy, hepatic    • H/O degenerative disc disease    • History of Lissa's gangrene    • Hypertension    • multiple Skin abscesses    • DUNLAP, bx showed stage IV fibrosis    • Neuropathy      Past Surgical History   Procedure Laterality Date   • Testicle surgery       DEBRIDEMENT FROM GANGRENE   • Leg surgery     • Amputation digit Left 2017     Procedure: left fourth and fifth transmetatarsal toe amputation ;  Surgeon: Juancho Martinez MD;  Location: [facility];  Service:           PT ASSESSMENT (last 72 hours)      PT Evaluation       17 1610 17 1212    Rehab Evaluation    Document Type evaluation  -DM     Subjective Information agree to therapy;complains of;weakness;pain;numbness;swelling   per  nsg,non-compl w/NWB L (amb toBRad anjelica x 2 w/FWB LLE a.m.  -DM     Patient Effort, Rehab Treatment good  -DM     Symptoms Noted During/After Treatment increased pain;fatigue  -DM     General Information    Patient Profile Review yes  -DM     Onset of Illness/Injury or Date of Surgery Date 02/13/17  -DM     Referring Physician CHINTAN Osullivan  -DM     General Observations Sup, in bed;tele; RA;IV;kerlix dsg over Lfoot incis.(transmet. amp.MT'S 3--5  -DM     Pertinent History Of Current Problem HAD transmet amp. Lfoot (MT's 3--5)on 1-30-17 d/t L foot ulcer/infect.;transf. to [facility], onIV ABX;told by MD that hyperbaric O2 Rx. would not accomplish healing;transf back to [facility] for sx.consult re: ? higher level amp.L LE d/t tunneling of wd;dx:cellulitis L resid.foot, hypomag.  -DM     Precautions/Limitations fall precautions;non-weight bearing status;other (see comments);insensate limb   non-compliant w/NWB L LE (transmet.amp.L foot);neuropathy  -DM     Prior Level of Function min assist:;gait;transfer;ADL's   [facility]. w/ [facility]  -DM     Equipment Currently Used at Home cane, straight;commode  -DM cane, straight;commode  -MC    Plans/Goals Discussed With patient;spouse/S.O.;agreed upon  -DM     Risks Reviewed patient:;spouse/S.O.:;LOB;increased discomfort;change in vital signs;increased drainage;lines disloged  -DM     Benefits Reviewed patient:;spouse/S.O.:;improve function;increase independence;increase strength;increase balance;decrease pain;increase knowledge  -DM     Barriers to Rehab medically complex;previous functional deficit  -DM     Living Environment    Lives With spouse  -DM spouse  -MC    Living Arrangements apartment  -DM apartment  -MC    Home Accessibility no concerns  -DM     Stair Railings at Home none  -DM     Type of Financial/Environmental Concern none  -DM     Transportation Available car;family or friend will provide  -DM family or friend will provide  -     Clinical Impression    Date of Referral to PT 02/13/17  -DM     PT Diagnosis impaired funct mobil.  -DM     Criteria for Skilled Therapeutic Interventions Met yes;treatment indicated  -DM     Rehab Potential good, to achieve stated therapy goals  -DM     Vital Signs    Pre Systolic BP Rehab 169  -DM     Pre Treatment Diastolic BP 97  -DM     Intra Systolic BP Rehab 179  -DM     Intra Treatment Diastolic BP 94  -DM     Post Systolic BP Rehab 172  -DM     Post Treatment Diastolic BP 98  -DM     Pretreatment Heart Rate (beats/min) 98  -DM     Intratreatment Heart Rate (beats/min) 112  -DM     Posttreatment Heart Rate (beats/min) 99  -DM     Pre SpO2 (%) 96  -DM     O2 Delivery Pre Treatment room air  -DM     Post SpO2 (%) 96  -DM     O2 Delivery Post Treatment room air  -DM     Pre Patient Position Supine  -DM     Intra Patient Position Standing  -DM     Post Patient Position Sitting  -DM     Pain Assessment    Pain Assessment No/denies pain   insensate L foot  -DM     Cognitive Assessment/Intervention    Current Cognitive/Communication Assessment functional  -DM     Orientation Status oriented x 4  -DM     Follows Commands/Answers Questions 100% of the time;75% of the time;able to follow single-step instructions;needs cueing;needs increased time   OCC. placing wt L LE despite cues to avoid  -DM     Personal Safety mild impairment;decreased awareness, need for assist;decreased awareness, need for safety;impulsive   non-compliant w/NWB L LE  -DM     Personal Safety Interventions fall prevention program maintained;gait belt;nonskid shoes/slippers when out of bed  -DM     Short/Long Term Memory intact short term memory;intact long term memory  -DM     ROM (Range of Motion)    General ROM lower extremity range of motion deficits identified   L ankle limited 25 % d/t neurop/surgical dsg  -DM     MMT (Manual Muscle Testing)    General MMT Assessment lower extremity strength deficits identified;upper extremity strength  deficits identified   B shldr's grossly 4 to 4+/5;L LE grossly 4/5 (ankle 2/5)   -DM     Mobility Assessment/Training    Extremity Weight-Bearing Status left lower extremity  -DM     Left Lower Extremity Weight-Bearing non weight-bearing  -DM     Bed Mobility, Assessment/Treatment    Bed Mobility, Assistive Device head of bed elevated;bed rails  -DM     Bed Mobility, Roll Left, [place] conditional independence  -DM     Bed Mobility, Roll Right, [place] conditional independence  -DM     Bed Mobility, Scoot/Bridge, [place] verbal cues required;independent  -DM     Bed Mob, Supine to Sit, [place] conditional independence   cues to keep L foot NWB once EOB  -DM     Bed Mobility, Safety Issues decreased use of arms for pushing/pulling;decreased use of legs for bridging/pushing  -DM     Bed Mobility, Impairments ROM decreased;strength decreased  -DM     Bed Mobility, Comment issued lyle std wx;brought knee wx for trial;  -DM     Transfer Assessment/Treatment    Transfers, Sit-Stand [place] minimum assist (75% patient effort);verbal cues required;2 person assist required   MIN.1 asst w/std wx;min 2 asst w/knee wx(P.T.,P.T.student)  -DM     Transfers, Stand-Sit [place] verbal cues required;minimum assist (75% patient effort);2 person assist required   diffic.shifting LLEoff knee wx.,reaching to low recliner(NWB  -DM     Transfers, Sit-Stand-Sit, Assist Device standard walker   2 trials std wx;3rd trial rolling knee wx  -DM     Transfer, Maintain Weight Bearing Status other (see comments)   maintained NWB 2 of 3 trials  -DM     Transfer, Safety Issues balance decreased during turns;weight-shifting ability decreased  -DM     Transfer, Impairments strength decreased;impaired balance   5 min sit rest btwn @ of 3 trials;BP taken w/@ (elev.)  -DM     Transfer, Comment 'prefers std wx';knee wx ret. to dept.  -DM     Gait Assessment/Treatment    Gait, [place] Level contact guard assist;2 person  assist required;verbal cues required   init CGA of2,then of 1(side step Std wx.6 ft;rolling knee wx  -DM     Gait, Assistive Device standard walker;other (see comments)   2 trials std wx (6 ft);3rd trial rolling Lknee wx (to melendrez)  -DM     Gait, Distance (Feet) 46   6 + 40  -DM     Gait, Gait Pattern Analysis swing-to gait  -DM     Gait, Gait Deviations gayla decreased;step length decreased;weight-shifting ability decreased  -DM     Gait, Maintain Weight Bearing Status other (see comments)   maintained NWB L LE on  2 of 3 trials  -DM     Gait, Safety Issues step length decreased;weight-shifting ability decreased  -DM     Gait, Impairments ROM decreased;sensation decreased;strength decreased;impaired balance  -DM     Gait, Comment req. sanjeevaner std wx be left at BS  -DM     Therapy Exercises    Bilateral Lower Extremities AROM:;10 reps;sitting;ankle pumps/circles;glut sets;hip flexion;LAQ   L foot resting on pillow during RLE exer  -DM     Bilateral Upper Extremity AROM:;10 reps;sitting;elbow flexion/extension;shoulder abduction/adduction;shoulder extension/flexion;shoulder horizontal abd/add;shoulder protraction/retraction;shoulder rolls/shrugs   ALSO RROM X 10 REPS SHLDRS,THEN ELBOWS  -DM     Positioning and Restraints    Pre-Treatment Position in bed  -DM     Post Treatment Position chair  -DM     In Chair sitting;call light within reach;with family/caregiver;with other staff;legs elevated  -DM       02/14/17 0800 02/14/17 0049    General Information    Equipment Currently Used at Home  cane, straight  -EK    Living Environment    Lives With  spouse  -EK    Living Arrangements  apartment  -EK    Home Accessibility  no concerns  -EK    Stair Railings at Home  none  -EK    Type of Financial/Environmental Concern  none  -EK    Transportation Available  car;family or friend will provide  -EK    Muscle Tone Assessment    LLE Muscle Tone Assessment mildly decreased tone  -AM       02/13/17 2000       Muscle Tone  Assessment    LLE Muscle Tone Assessment mildly decreased tone  -EK       User Key  (r) = Recorded By, (t) = Taken By, (c) = Cosigned By    Initials Name Provider Type    DM Kelly Crawford, PT Physical Therapist    OLGA Odonnell     EK Shayy Worthy, RN Registered Nurse    SHEILA Queen, RN Registered Nurse          Physical Therapy Education     Title: PT OT SLP Therapies (Active)     Topic: Physical Therapy (Active)     Point: Mobility training (Active)    Learning Progress Summary    Learner Readiness Method Response Comment Documented by Status   Patient Eager D,E NR  DM 02/14/17 1806 Active   Significant Other Eager D,E NR  DM 02/14/17 1806 Active               Point: Home exercise program (Active)    Learning Progress Summary    Learner Readiness Method Response Comment Documented by Status   Patient Eager D,E NR  DM 02/14/17 1806 Active   Significant Other Eager D,E NR  DM 02/14/17 1806 Active               Point: Body mechanics (Active)    Learning Progress Summary    Learner Readiness Method Response Comment Documented by Status   Patient Eager D,E NR  DM 02/14/17 1806 Active   Significant Other Eager D,E NR  DM 02/14/17 1806 Active               Point: Precautions (Active)    Learning Progress Summary    Learner Readiness Method Response Comment Documented by Status   Patient Eager D,E NR  DM 02/14/17 1806 Active   Significant Other Eager D,E NR  DM 02/14/17 1806 Active                      User Key     Initials Effective Dates Name Provider Type Discipline    DM 06/19/15 -  Kelly Crawford, PT Physical Therapist PT                PT Recommendation and Plan  Anticipated Equipment Needs At Discharge: gait belt, walker, rolling knee walker (std wx vs. knee wx)  Anticipated Discharge Disposition: inpatient rehabilitation facility  PT Frequency: daily  Plan of Care Review  Plan Of Care Reviewed With: patient, spouse  Progress: progress towards functional goals is fair  Outcome  Summary/Follow up Plan: Adm w/ cellulitis/infect. L foot s/p transmet. amp. MT's 3--5 on 1-30, for sx.consult re: possible higher level amp.; presents w/ evolving sympt. incl. hypomag, signif.elev BP > 170/90's, elev HR  W/ mobil., & signif weakness/neuropathy/inability to maintain NWB L LE consistently, posing fall risk; pt prefers std wx vs. rolling knee wx during trial of @ this session;will benefit from IPPT for strengthening & prog mobil. trng; recommend return to [facility], to resume rehab s/p d/c (considering addnl. amputation)             IP PT Goals       02/14/17 1806          Bed Mobility PT LTG    Bed Mobility PT LTG, Date Established 02/14/17  -DM      Bed Mobility PT LTG, Time to Achieve 1 wk  -DM      Bed Mobility PT LTG, Activity Type all bed mobility  -DM      Bed Mobility PT LTG, [place] Level independent  -DM      Transfer Training PT LTG    Transfer Training PT LTG, Date Established 02/14/17  -DM      Transfer Training PT LTG, Time to Achieve 1 wk  -DM      Transfer Training PT LTG, Activity Type bed to chair /chair to bed;sit to stand/stand to sit  -DM      Transfer Training PT LTG, [place] Level contact guard assist  -DM      Transfer Training PT LTG, Assist Device walker, standard   NWB L LE; STABLE VS  -DM      Gait Training PT LTG    Gait Training Goal PT LTG, Date Established 02/14/17  -DM      Gait Training Goal PT LTG, Time to Achieve 1 wk  -DM      Gait Training Goal PT LTG, [place] Level contact guard assist  -DM      Gait Training Goal PT LTG, Assist Device walker, standard   NWB L LE, W/ stable VS  -DM      Gait Training Goal PT LTG, Distance to Achieve 25  -DM        User Key  (r) = Recorded By, (t) = Taken By, (c) = Cosigned By    Initials Name Provider Type    MANUEL Crawford, PT Physical Therapist                Outcome Measures       02/14/17 1610          How much help from another person do you currently need...    Turning from your back to your side  while in flat bed without using bedrails? 4  -DM      Moving from lying on back to sitting on the side of a flat bed without bedrails? 4  -DM      Moving to and from a bed to a chair (including a wheelchair)? 3  -DM      Standing up from a chair using your arms (e.g., wheelchair, bedside chair)? 3  -DM      Climbing 3-5 steps with a railing? 1  -DM      To walk in hospital room? 3  -DM      AM-PAC 6 Clicks Score 18  -DM      Functional Assessment    Outcome Measure Options AM-PAC 6 Clicks Basic Mobility (PT)  -DM        User Key  (r) = Recorded By, (t) = Taken By, (c) = Cosigned By    Initials Name Provider Type    MANUEL Crawford, PT Physical Therapist           Time Calculation:         PT Charges       02/14/17 1807          Time Calculation    Start Time 1610  -DM      PT Received On 02/14/17  -DM      PT Goal Re-Cert Due Date 02/24/17  -DM      Time Calculation- PT    Total Timed Code Minutes- PT 52 minute(s)  -DM        User Key  (r) = Recorded By, (t) = Taken By, (c) = Cosigned By    Initials Name Provider Type    MANUEL Crawford, PT Physical Therapist          Therapy Charges for Today     Code Description Service Date Service Provider Modifiers Qty    75209100578 HC PT THER PROC EA 15 MIN 2/14/2017 Kelly Crawford, PT GP 2    94566494784 HC PT EVAL MOD COMPLEXITY 4 2/14/2017 Kelly Crawford, PT GP 1    87479793824 HC GAIT TRAINING EA 15 MIN 2/14/2017 Kelly Crawford, PT GP 1          PT G-Codes  Outcome Measure Options: AM-PAC 6 Clicks Basic Mobility (PT)      Kelly Crawford, PT  2/14/2017

## 2017-02-14 NOTE — NURSING NOTE
WOCN consult for wound care to foot, per Dr Martinez note PT wound care ordered for wound vac.  No WOCN need at this time, please reconsult if the need arises. - Thank you

## 2017-02-15 LAB
ANION GAP SERPL CALCULATED.3IONS-SCNC: 4 MMOL/L (ref 3–11)
BUN BLD-MCNC: 17 MG/DL (ref 9–23)
BUN/CREAT SERPL: 28.3 (ref 7–25)
CALCIUM SPEC-SCNC: 8.8 MG/DL (ref 8.7–10.4)
CHLORIDE SERPL-SCNC: 102 MMOL/L (ref 99–109)
CO2 SERPL-SCNC: 30 MMOL/L (ref 20–31)
CREAT BLD-MCNC: 0.6 MG/DL (ref 0.6–1.3)
GFR SERPL CREATININE-BSD FRML MDRD: 144 ML/MIN/1.73
GLUCOSE BLD-MCNC: 253 MG/DL (ref 70–100)
GLUCOSE BLDC GLUCOMTR-MCNC: 115 MG/DL (ref 70–130)
GLUCOSE BLDC GLUCOMTR-MCNC: 129 MG/DL (ref 70–130)
GLUCOSE BLDC GLUCOMTR-MCNC: 134 MG/DL (ref 70–130)
GLUCOSE BLDC GLUCOMTR-MCNC: 251 MG/DL (ref 70–130)
POTASSIUM BLD-SCNC: 4.1 MMOL/L (ref 3.5–5.5)
SODIUM BLD-SCNC: 136 MMOL/L (ref 132–146)

## 2017-02-15 PROCEDURE — 97606 NEG PRS WND THER DME>50 SQCM: CPT

## 2017-02-15 PROCEDURE — 99232 SBSQ HOSP IP/OBS MODERATE 35: CPT | Performed by: INTERNAL MEDICINE

## 2017-02-15 PROCEDURE — 97597 DBRDMT OPN WND 1ST 20 CM/<: CPT

## 2017-02-15 PROCEDURE — 97164 PT RE-EVAL EST PLAN CARE: CPT

## 2017-02-15 PROCEDURE — 25010000003 CEFTRIAXONE PER 250 MG: Performed by: INTERNAL MEDICINE

## 2017-02-15 PROCEDURE — C1894 INTRO/SHEATH, NON-LASER: HCPCS

## 2017-02-15 PROCEDURE — 80048 BASIC METABOLIC PNL TOTAL CA: CPT | Performed by: INTERNAL MEDICINE

## 2017-02-15 PROCEDURE — 82962 GLUCOSE BLOOD TEST: CPT

## 2017-02-15 PROCEDURE — 25010000002 PIPERACILLIN-TAZOBACTAM

## 2017-02-15 PROCEDURE — C1751 CATH, INF, PER/CENT/MIDLINE: HCPCS

## 2017-02-15 PROCEDURE — 25010000002 HEPARIN (PORCINE) PER 1000 UNITS: Performed by: INTERNAL MEDICINE

## 2017-02-15 PROCEDURE — 25010000002 DAPTOMYCIN PER 1 MG: Performed by: INTERNAL MEDICINE

## 2017-02-15 PROCEDURE — 63710000001 INSULIN DETEMIR PER 5 UNITS: Performed by: NURSE PRACTITIONER

## 2017-02-15 RX ORDER — CEFTRIAXONE SODIUM 2 G/50ML
2 INJECTION, SOLUTION INTRAVENOUS DAILY
Status: DISCONTINUED | OUTPATIENT
Start: 2017-02-15 | End: 2017-02-17 | Stop reason: HOSPADM

## 2017-02-15 RX ORDER — LOSARTAN POTASSIUM 50 MG/1
100 TABLET ORAL
Status: DISCONTINUED | OUTPATIENT
Start: 2017-02-16 | End: 2017-02-17 | Stop reason: HOSPADM

## 2017-02-15 RX ORDER — LOSARTAN POTASSIUM 50 MG/1
50 TABLET ORAL
Status: DISCONTINUED | OUTPATIENT
Start: 2017-02-16 | End: 2017-02-15

## 2017-02-15 RX ORDER — SODIUM CHLORIDE 0.9 % (FLUSH) 0.9 %
10 SYRINGE (ML) INJECTION AS NEEDED
Status: DISCONTINUED | OUTPATIENT
Start: 2017-02-15 | End: 2017-02-17 | Stop reason: HOSPADM

## 2017-02-15 RX ADMIN — CEFTRIAXONE SODIUM 2 G: 2 INJECTION, SOLUTION INTRAVENOUS at 15:00

## 2017-02-15 RX ADMIN — FAMOTIDINE 20 MG: 20 TABLET ORAL at 08:30

## 2017-02-15 RX ADMIN — DOCUSATE SODIUM 100 MG: 100 CAPSULE, LIQUID FILLED ORAL at 08:31

## 2017-02-15 RX ADMIN — FAMOTIDINE 20 MG: 20 TABLET ORAL at 17:08

## 2017-02-15 RX ADMIN — GABAPENTIN 1200 MG: 300 CAPSULE ORAL at 21:50

## 2017-02-15 RX ADMIN — INSULIN LISPRO 10 UNITS: 100 INJECTION, SOLUTION INTRAVENOUS; SUBCUTANEOUS at 17:09

## 2017-02-15 RX ADMIN — INSULIN DETEMIR 30 UNITS: 100 INJECTION, SOLUTION SUBCUTANEOUS at 08:32

## 2017-02-15 RX ADMIN — INSULIN LISPRO 4 UNITS: 100 INJECTION, SOLUTION INTRAVENOUS; SUBCUTANEOUS at 08:37

## 2017-02-15 RX ADMIN — DAPTOMYCIN 750 MG: 500 INJECTION, POWDER, LYOPHILIZED, FOR SOLUTION INTRAVENOUS at 12:44

## 2017-02-15 RX ADMIN — TAZOBACTAM SODIUM AND PIPERACILLIN SODIUM 4.5 G: .5; 4 INJECTION, POWDER, LYOPHILIZED, FOR SOLUTION INTRAVENOUS at 08:31

## 2017-02-15 RX ADMIN — HEPARIN SODIUM 5000 UNITS: 5000 INJECTION, SOLUTION INTRAVENOUS; SUBCUTANEOUS at 21:50

## 2017-02-15 RX ADMIN — INSULIN DETEMIR 30 UNITS: 100 INJECTION, SOLUTION SUBCUTANEOUS at 21:49

## 2017-02-15 RX ADMIN — GABAPENTIN 1200 MG: 300 CAPSULE ORAL at 14:59

## 2017-02-15 RX ADMIN — LISINOPRIL 40 MG: 20 TABLET ORAL at 08:30

## 2017-02-15 RX ADMIN — INSULIN LISPRO 10 UNITS: 100 INJECTION, SOLUTION INTRAVENOUS; SUBCUTANEOUS at 12:45

## 2017-02-15 RX ADMIN — INSULIN LISPRO 10 UNITS: 100 INJECTION, SOLUTION INTRAVENOUS; SUBCUTANEOUS at 08:32

## 2017-02-15 RX ADMIN — HYDROCODONE BITARTRATE AND ACETAMINOPHEN 1 TABLET: 10; 325 TABLET ORAL at 08:29

## 2017-02-15 RX ADMIN — HYDROCODONE BITARTRATE AND ACETAMINOPHEN 1 TABLET: 10; 325 TABLET ORAL at 14:59

## 2017-02-15 RX ADMIN — HEPARIN SODIUM 5000 UNITS: 5000 INJECTION, SOLUTION INTRAVENOUS; SUBCUTANEOUS at 08:31

## 2017-02-15 RX ADMIN — GABAPENTIN 1200 MG: 300 CAPSULE ORAL at 07:07

## 2017-02-15 RX ADMIN — ASPIRIN 81 MG: 81 TABLET, COATED ORAL at 08:29

## 2017-02-15 RX ADMIN — HYDROCODONE BITARTRATE AND ACETAMINOPHEN 1 TABLET: 10; 325 TABLET ORAL at 21:58

## 2017-02-15 RX ADMIN — Medication 1 CAPSULE: at 08:30

## 2017-02-15 RX ADMIN — DOCUSATE SODIUM 100 MG: 100 CAPSULE, LIQUID FILLED ORAL at 17:08

## 2017-02-15 NOTE — PROGRESS NOTES
Cardiothoracic Surgery Progress Note    1/30/17   Left 5/4/3 transmetatarsal amputation      LOS: 2 days      Subjective:  No acute events overnight.     Objective:  Vital Signs  Temp:  [97.9 °F (36.6 °C)-98.5 °F (36.9 °C)] 98.5 °F (36.9 °C)  Heart Rate:  [] 98  Resp:  [18-20] 18  BP: (147-179)/() 147/81    Physical Exam:   General Appearance: alert, appears stated age and cooperative   Lungs: clear to auscultation, respirations regular, respirations even and respirations unlabored   Heart: regular rhythm & normal rate, normal S1, S2 and no murmur, no clau, no rub   Skin: Left foot wound with no surrounding erythema.  No drainage from wound.  Skin changes on 2nd toe with dry eschar along medial surface.  Non-viable adipose tissue along pedal surface     Results:    Results from last 7 days  Lab Units 02/13/17 2033   WBC 10*3/mm3 11.15*   HEMOGLOBIN g/dL 10.9*   HEMATOCRIT % 33.5*   PLATELETS 10*3/mm3 236       Results from last 7 days  Lab Units 02/13/17  2033   SODIUM mmol/L 136   POTASSIUM mmol/L 4.0   CHLORIDE mmol/L 101   TOTAL CO2 mmol/L 28.0   BUN mg/dL 14   CREATININE mg/dL 0.40*   GLUCOSE mg/dL 134*   CALCIUM mg/dL 9.5         Assessment:  Left lower extremity wound status post 5/4/3 transmetatarsal amputation.  Marginal healing.      Plan:  Bedside debridement of adipose tissue  Place wound vac today  Continue ABX as per ID  Hold on Left AKA

## 2017-02-15 NOTE — PLAN OF CARE
Problem: Patient Care Overview (Adult)  Goal: Plan of Care Review  Outcome: Ongoing (interventions implemented as appropriate)    02/15/17 0553   Coping/Psychosocial Response Interventions   Plan Of Care Reviewed With patient   Patient Care Overview   Progress progress toward functional goals as expected         Problem: Skin Integrity Impairment, Risk/Actual (Adult)  Goal: Skin Integrity/Wound Healing  Outcome: Ongoing (interventions implemented as appropriate)    02/15/17 0553   Skin Integrity Impairment, Risk/Actual (Adult)   Skin Integrity/Wound Healing making progress toward outcome

## 2017-02-15 NOTE — CONSULTS
Placed by XUAN Raines RN - confirmed with 3CG technology.  RUE single lumen PICC with 47cm total and 2cm exposed.

## 2017-02-15 NOTE — PLAN OF CARE
Problem: Skin Integrity Impairment, Risk/Actual (Adult)  Goal: Identify Related Risk Factors and Signs and Symptoms  Outcome: Outcome(s) achieved Date Met:  02/15/17  Goal: Skin Integrity/Wound Healing  Outcome: Ongoing (interventions implemented as appropriate)

## 2017-02-15 NOTE — PROGRESS NOTES
Adult Nutrition  Assessment/PES    Patient Name:  Kendrick Crystal  YOB: 1970  MRN: [id number]  Admit Date:  2/13/2017    Assessment Date:  2/15/2017        Reason for Assessment       02/15/17 1206    Reason for Assessment    Reason For Assessment/Visit physician consult    Identified At Risk By Screening Criteria diagnosis    Time Spent (min) 30    Cardiac HTN;Dyslipidemia    Endocrine DM Type 2;Other (comment)   PERIPHERAL NEUROPATHY    Hepatic NAFLD;Other (comment)   WITH STAGE 4 HEPATIC FIBROSIS, HX OF HEPATIC ENCEPHALOPATHY    Skin Other (comment)   DIABETIC FOOT ULCER, S/P METATARSAL AMPUTATION (1/30)    Other diagnosis HX OF NON-COMPLIANCE              Nutrition/Diet History       02/15/17 1210    Nutrition/Diet History    Functional Status/Food Prep Mobility --   PT REPORTS WILLINGNESS TO TRY EXTRA PROTEIN OPTIONS THAT ARE NOT EXCESSIVELY HIGH IN CHO OR KCALS TO ASSIST WITH ACHIEVING PROTEIN NEEDS FOR WOUND HEALING.    Factors Affecting Nutritional Intake Factors              Labs/Tests/Procedures/Meds       02/15/17 1211    Labs/Tests/Procedures/Meds    Labs/Tests Review Reviewed;Glucose    Medication Review Other (comment)   NOTED NEW INSULIN ORDERS            Physical Findings       02/15/17 1214    Physical Findings/Assessment    Additional Documentation Physical Appearance (Group)    Physical Appearance    Gastrointestinal other (see comments)   WDL PER DOCUMENTATION            Estimated/Assessed Needs       02/15/17 1215    Estimated/Assessed Protein Needs    Weight Used for Protein Calculation 88.9 kg (196 lb)    Protein (gm/kg) 2.0   TO PROMOTE HEALING IN OBESE PT    2.0 Gm Protein (gm) 177.81            Nutrition Prescription Ordered       02/15/17 1216    Nutrition Prescription PO    Current PO Diet Regular    Common Modifiers Consistent Carbohydrate            Evaluation of Received Nutrient/Fluid Intake       02/15/17 1216    PO Evaluation    Number of Days PO Intake Evaluated  Insufficient Data    Number of Meals 1    % PO Intake 75              Problem/Interventions:        Problem 1       02/15/17 1216    Nutrition Diagnoses Problem 1    Problem 1 Inadequate Nutrient Intake    Etiology (related to) MNT for Treatment/Condition   PROTEIN NEEDS FOR FOOD WOUND HEALING IN OBESE PT WITH DUNLAP    Signs/Symptoms (evidenced by) Other (comment)   CURRENT PO DIET NOT HIGH ENOUGH IN PROTEIN WITHOUT SUPPLEMENTATION                    Intervention Goal       02/15/17 1218    Intervention Goal    General Nutrition support treatment    PO Establish PO;Meet estimated needs   FOR PROTEIN NEED            Nutrition Intervention       02/15/17 1218    Nutrition Intervention    RD/Tech Action Advise alternate selection;Advise available snack;Interview for preference;Menu adjusted;Encourage intake;Recommend/ordered;Follow Tx progress    Recommended/Ordered Supplement            Nutrition Prescription       02/15/17 1219    Nutrition Prescription PO    PO Prescription Begin/change supplement    Supplement Boost Glucose Control;PRO liquid    Supplement Frequency 3 times a day    New PO Prescription Ordered? Yes            Education/Evaluation       02/15/17 1219    Monitor/Evaluation    Monitor Per protocol;PO intake;Supplement intake;Pertinent labs;Weight;Skin status        Comments:      Electronically signed by:  Erinn Melgar RD  02/15/17 12:19 PM

## 2017-02-15 NOTE — PROGRESS NOTES
Acute Care - Wound/Debridement Initial Evaluation  [facility]     Patient Name: Kendrick Crystal  : 1970  MRN: [id number]  Today's Date: 2/15/2017  Onset of Illness/Injury or Date of Surgery Date: 17  Date of Referral to PT: 17   Referring Physician: MD Michelle      Admit Date: 2017    Visit Dx:    ICD-10-CM ICD-9-CM   1. Impaired functional mobility, balance, gait, and endurance Z74.09 V49.89       Patient Active Problem List   Diagnosis   • Cirrhosis   • Essential hypertension   • Non-compliance   • Type 2 diabetes mellitus with hyperosmolarity without nonketotic hyperglycemic-hyperosmolar coma (nkhhc)   • Arthritis   • Hyperlipemia   • Hypertriglyceridemia, essential   • Diabetic foot ulcer associated with diabetes mellitus due to underlying condition   • Cellulitis of left foot   • Poorly controlled type 2 diabetes mellitus with peripheral neuropathy   • Type 2 diabetes mellitus with foot ulcer, with long-term current use of insulin   • DUNLAP (nonalcoholic steatohepatitis)   • Hyponatremia   • Neutrophilic leukocytosis   • Hypomagnesemia   • Diabetic foot ulcer   • Encephalopathy, hepatic   • Hypertension        Past Medical History   Diagnosis Date   • Arthritis    • Cirrhosis    • Counseling for insulin pump    • Diabetes mellitus    • Encephalopathy, hepatic    • H/O degenerative disc disease    • History of Lissa's gangrene    • Hyperlipemia    • Hypertension    • multiple Skin abscesses    • DUNLAP, bx showed stage IV fibrosis    • Neuropathy         Past Surgical History   Procedure Laterality Date   • Testicle surgery       DEBRIDEMENT FROM GANGRENE   • Leg surgery     • Amputation digit Left 2017     Procedure: left fourth and fifth transmetatarsal toe amputation ;  Surgeon: Juancho Martinez MD;  Location: [facility];  Service:                LDA Wound       02/15/17 1315          Wound 17 0815 Left lateral foot other (see comments)    Wound - Properties Group Date first  assessed: 01/31/17  - Time first assessed: 0815  - Side: Left  - Orientation: lateral  - Location: foot  -MF Type: other (see comments)  -MF, 3rd, 4th, and 5th toe amputation site     Wound WDL ex   periwound redness  -      Dressing Appearance moist drainage;intact   previous wet-to-dry dressing  -      Base black eschar;bone;white;tendon;yellow;slough;pink   Dry areas of eschar along edge/2nd toe, moist proximally  -      Periwound Area intact;dry;redness  -      Edges open;irregular  -MC      Length (cm) 14.2  -MC      Width (cm) 4.7  -MC      Depth (cm) 3.8   at widest part of foot to perpendicular depth  -      Tunneling [depth (cm)/Location] 1.7 cm @ 12:00  -      Drainage Characteristics/Odor serosanguineous;malodorous  -      Drainage Amount small  -      Wound Cleaning cleansed with;other (see comments)   phase one wound cleanser  -      Wound Interventions debrided;other (see comments)   wound vac  -      Dressing other (see comments)   wound vac  -      Periwound Care cleansed with pH balanced cleanser;dry periwound area maintained;barrier film applied;other (see comments)   NoSting, stomapaste, drape border  -      Therapy Setting (Negative Pressure Wound Therapy) continuous therapy   VC 10mL Nsaline q3.5h, 1 minute soak  -      Pressure Setting (Negative Pressure Wound Therapy) 125 mmHg  -      Dressing (Negative Pressure Wound Therapy) foam, silver;gauze, nonadherent  -      Sponges Inserted (Negative Pressure Wound Therapy) 1;other (see comments)   1 adaptic, 1 silver  -      Sponges Removed (Negative Pressure Wound Therapy) other (see comments)   removed all wet-to-dry packing  -      Output (mL) 0  -MC        User Key  (r) = Recorded By, (t) = Taken By, (c) = Cosigned By    Initials Name Provider Type    KARLOS Johnson, PT Physical Therapist    OLGA De Jesus, PT Physical Therapist              WOUND DEBRIDEMENT  Debridement Site 1  Location-  Site 1: L foot  Selective Debridement- Site 1: Wound Surface <20cmsq  Instruments- Site 1: tweezers, scissors  Excised Tissue Description- Site 1: moderate, necrotic, adipose, slough  Bleeding- Site 1: none                  PT ASSESSMENT (last 72 hours)      PT Evaluation       02/15/17 1315 02/14/17 1610    Rehab Evaluation    Document Type evaluation  - evaluation  -DM    Subjective Information no complaints;agree to therapy  - agree to therapy;complains of;weakness;pain;numbness;swelling   per nsg,non-compl w/NWB L (amb toBRad anjelica x 2 w/FWB LLE a.m.  -DM    Patient Effort, Rehab Treatment  good  -DM    Symptoms Noted During/After Treatment  increased pain;fatigue  -DM    General Information    Patient Profile Review yes  - yes  -DM    Onset of Illness/Injury or Date of Surgery Date 02/13/17  - 02/13/17  -DM    Referring Physician MD Michelle  - CHINTAN Osullivan  -DM    General Observations  Sup, in bed;tele; RA;IV;kerlix dsg over Lfoot incis.(transmet. amp.MT'S 3--5  -DM    Pertinent History Of Current Problem See PT mobility note for details. Pt wants to try approximately one month of aggressive wound care prior to considering an amputation.  - HAD transmet amp. Lfoot (MT's 3--5)on 1-30-17 d/t L foot ulcer/infect.;transf. to [facility], onIV ABX;told by MD that hyperbaric O2 Rx. would not accomplish healing;transf back to [facility] for sx.consult re: ? higher level amp.L LE d/t tunneling of wd;dx:cellulitis L resid.foot, hypomag.  -DM    Precautions/Limitations fall precautions;non-weight bearing status;other (see comments)   wound vac. NWB LLE  - fall precautions;non-weight bearing status;other (see comments);insensate limb   non-compliant w/NWB L LE (transmet.amp.L foot);neuropathy  -DM    Prior Level of Function  min assist:;gait;transfer;ADL's   [facility]  -DM    Equipment Currently Used at Home  cane, straight;commode  -DM    Plans/Goals Discussed With  patient;spouse/S.O.;agreed upon  -MC patient;spouse/S.O.;agreed upon  -DM    Risks Reviewed patient:;spouse/S.O.:;increased discomfort  -MC patient:;spouse/S.O.:;LOB;increased discomfort;change in vital signs;increased drainage;lines disloged  -DM    Benefits Reviewed patient:;spouse/S.O.:;improve skin integrity  -MC patient:;spouse/S.O.:;improve function;increase independence;increase strength;increase balance;decrease pain;increase knowledge  -DM    Barriers to Rehab medically complex;previous functional deficit  -MC medically complex;previous functional deficit  -DM    Living Environment    Lives With  spouse  -DM    Living Arrangements  apartment  -DM    Home Accessibility  no concerns  -DM    Stair Railings at Home  none  -DM    Type of Financial/Environmental Concern  none  -DM    Transportation Available  car;family or friend will provide  -DM    Clinical Impression    Date of Referral to PT 02/14/17  -MC 02/13/17  -DM    PT Diagnosis impaired skin integrity  -MC impaired funct mobil.  -DM    Patient/Family Goals Statement To try to heal the wound.  -     Criteria for Skilled Therapeutic Interventions Met yes;treatment indicated  -MC yes;treatment indicated  -DM    Rehab Potential fair, will monitor progress closely  -MC good, to achieve stated therapy goals  -DM    Vital Signs    Pre Systolic BP Rehab  169  -DM    Pre Treatment Diastolic BP  97  -DM    Intra Systolic BP Rehab  179  -DM    Intra Treatment Diastolic BP  94  -DM    Post Systolic BP Rehab  172  -DM    Post Treatment Diastolic BP  98  -DM    Pretreatment Heart Rate (beats/min)  98  -DM    Intratreatment Heart Rate (beats/min)  112  -DM    Posttreatment Heart Rate (beats/min)  99  -DM    Pre SpO2 (%)  96  -DM    O2 Delivery Pre Treatment  room air  -DM    Post SpO2 (%)  96  -DM    O2 Delivery Post Treatment  room air  -DM    Pre Patient Position  Supine  -DM    Intra Patient Position  Standing  -DM    Post Patient Position  Sitting  -DM    Pain  Assessment    Pain Assessment No/denies pain  -MC No/denies pain   insensate L foot  -DM    Cognitive Assessment/Intervention    Current Cognitive/Communication Assessment functional  -MC functional  -DM    Orientation Status oriented x 4  -MC oriented x 4  -DM    Follows Commands/Answers Questions 100% of the time;able to follow single-step instructions  -% of the time;75% of the time;able to follow single-step instructions;needs cueing;needs increased time   OCC. placing wt L LE despite cues to avoid  -DM    Personal Safety  mild impairment;decreased awareness, need for assist;decreased awareness, need for safety;impulsive   non-compliant w/NWB L LE  -DM    Personal Safety Interventions  fall prevention program maintained;gait belt;nonskid shoes/slippers when out of bed  -DM    Short/Long Term Memory  intact short term memory;intact long term memory  -DM    ROM (Range of Motion)    General ROM  lower extremity range of motion deficits identified   L ankle limited 25 % d/t neurop/surgical dsg  -DM    MMT (Manual Muscle Testing)    General MMT Assessment  lower extremity strength deficits identified;upper extremity strength deficits identified   B shldr's grossly 4 to 4+/5;L LE grossly 4/5 (ankle 2/5)   -DM    Mobility Assessment/Training    Extremity Weight-Bearing Status  left lower extremity  -DM    Left Lower Extremity Weight-Bearing  non weight-bearing  -DM    Bed Mobility, Assessment/Treatment    Bed Mobility, Assistive Device  head of bed elevated;bed rails  -DM    Bed Mobility, Roll Left, Dickinson  conditional independence  -DM    Bed Mobility, Roll Right, Dickinson  conditional independence  -DM    Bed Mobility, Scoot/Bridge, Dickinson  verbal cues required;independent  -DM    Bed Mob, Supine to Sit, Dickinson  conditional independence   cues to keep L foot NWB once EOB  -DM    Bed Mobility, Safety Issues  decreased use of arms for pushing/pulling;decreased use of legs for  bridging/pushing  -DM    Bed Mobility, Impairments  ROM decreased;strength decreased  -DM    Bed Mobility, Comment  issued loaner std wx;brought knee wx for trial;  -DM    Transfer Assessment/Treatment    Transfers, Sit-Stand Elliott  minimum assist (75% patient effort);verbal cues required;2 person assist required   MIN.1 asst w/std wx;min 2 asst w/knee wx(P.T.,P.T.student)  -DM    Transfers, Stand-Sit Elliott  verbal cues required;minimum assist (75% patient effort);2 person assist required   diffic.shifting LLEoff knee wx.,reaching to low recliner(NWB  -DM    Transfers, Sit-Stand-Sit, Assist Device  standard walker   2 trials std wx;3rd trial rolling knee wx  -DM    Transfer, Maintain Weight Bearing Status  other (see comments)   maintained NWB 2 of 3 trials  -DM    Transfer, Safety Issues  balance decreased during turns;weight-shifting ability decreased  -DM    Transfer, Impairments  strength decreased;impaired balance   5 min sit rest btwn @ of 3 trials;BP taken w/@ (elev.)  -DM    Transfer, Comment  'prefers std wx';knee wx ret. to dept.  -DM    Gait Assessment/Treatment    Gait, Elliott Level  contact guard assist;2 person assist required;verbal cues required   init CGA of2,then of 1(side step Std wx.6 ft;rolling knee wx  -DM    Gait, Assistive Device  standard walker;other (see comments)   2 trials std wx (6 ft);3rd trial rolling Lknee wx (to melendrez)  -DM    Gait, Distance (Feet)  46   6 + 40  -DM    Gait, Gait Pattern Analysis  swing-to gait  -DM    Gait, Gait Deviations  gayla decreased;step length decreased;weight-shifting ability decreased  -DM    Gait, Maintain Weight Bearing Status  other (see comments)   maintained NWB L LE on  2 of 3 trials  -DM    Gait, Safety Issues  step length decreased;weight-shifting ability decreased  -DM    Gait, Impairments  ROM decreased;sensation decreased;strength decreased;impaired balance  -DM    Gait, Comment  req. loaner std wx be left at BS  -DM     Therapy Exercises    Bilateral Lower Extremities  AROM:;10 reps;sitting;ankle pumps/circles;glut sets;hip flexion;LAQ   L foot resting on pillow during RLE exer  -DM    Bilateral Upper Extremity  AROM:;10 reps;sitting;elbow flexion/extension;shoulder abduction/adduction;shoulder extension/flexion;shoulder horizontal abd/add;shoulder protraction/retraction;shoulder rolls/shrugs   ALSO RROM X 10 REPS SHLDRS,THEN ELBOWS  -DM    Positioning and Restraints    Pre-Treatment Position in bed  - in bed  -    Post Treatment Position bed  - chair  -DM    In Bed supine;call light within reach;encouraged to call for assist;with family/caregiver  -     In Chair  sitting;call light within reach;with family/caregiver;with other staff;legs elevated  -DM      02/14/17 1212 02/14/17 0800    General Information    Equipment Currently Used at Home cane, straight;commode  -MCA     Living Environment    Lives With spouse  -MCA     Living Arrangements apartment  -[facility]     Transportation Available family or friend will provide  -MCA     Muscle Tone Assessment    LLE Muscle Tone Assessment  mildly decreased tone  -AM      02/14/17 0049 02/13/17 2000    General Information    Equipment Currently Used at Home cane, straight  -EK     Living Environment    Lives With spouse  -EK     Living Arrangements apartment  -EK     Home Accessibility no concerns  -EK     Stair Railings at Home none  -EK     Type of Financial/Environmental Concern none  -EK     Transportation Available car;family or friend will provide  -EK     Muscle Tone Assessment    LLE Muscle Tone Assessment  mildly decreased tone  -EK      User Key  (r) = Recorded By, (t) = Taken By, (c) = Cosigned By    Initials Name Provider Type    DM Kelly Crawford, PT Physical Therapist    [facility] Princess Odonnell      Marlin De Jesus, PT Physical Therapist    EK Shayy Worthy, RN Registered Nurse    AM Lucina Queen, RN Registered Nurse        Physical Therapy Education      Title: PT OT SLP Therapies (Active)     Topic: Physical Therapy (Active)     Point: Mobility training (Active)    Learning Progress Summary    Learner Readiness Method Response Comment Documented by Status   Patient Eager D,E NR  DM 02/14/17 1806 Active   Significant Other Eager D,E NR  DM 02/14/17 1806 Active               Point: Home exercise program (Active)    Learning Progress Summary    Learner Readiness Method Response Comment Documented by Status   Patient Eager D,E NR  DM 02/14/17 1806 Active   Significant Other Eager D,E NR  DM 02/14/17 1806 Active               Point: Body mechanics (Active)    Learning Progress Summary    Learner Readiness Method Response Comment Documented by Status   Patient Eager D,E NR  DM 02/14/17 1806 Active   Significant Other Eager D,E NR  DM 02/14/17 1806 Active               Point: Precautions (Active)    Learning Progress Summary    Learner Readiness Method Response Comment Documented by Status   Patient Eager D,E NR  DM 02/14/17 1806 Active   Significant Other Amandaer D,E NR  DM 02/14/17 1806 Active                      User Key     Initials Effective Dates Name Provider Type Discipline     06/19/15 -  Kelly Crawford, PT Physical Therapist PT                    Recommendation and Plan  Anticipated Equipment Needs At Discharge: gait belt, walker, rolling knee walker (std wx vs. knee wx)  Anticipated Discharge Disposition: home with home health (per pt/spouse, they want to go home)  PT Frequency: daily    Plan Of Care Reviewed With: patient, spouse       Outcome Summary/Follow up Plan: PT wound care evaluation complete. Pt with complex L foot wound s/p 3-5 transmetatarsal amputation with worsening of wound status after d/c from his recent inpt admission to [facility]. The wound has areas of dry, brown/black eschar and necrotic adipose, as well as bone and moist white tendon. Pt will benefit from debridement of necrotic tissue and NPWT to manage exudate, promote granulation tissue  formation, and maintain a clean wound environment.             IP PT Goals       02/15/17 1315 02/14/17 1806       Bed Mobility PT LTG    Bed Mobility PT LTG, Date Established  02/14/17  -DM     Bed Mobility PT LTG, Time to Achieve  1 wk  -DM     Bed Mobility PT LTG, Activity Type  all bed mobility  -DM     Bed Mobility PT LTG, [place] Level  independent  -DM     Transfer Training PT LTG    Transfer Training PT LTG, Date Established  02/14/17  -DM     Transfer Training PT LTG, Time to Achieve  1 wk  -DM     Transfer Training PT LTG, Activity Type  bed to chair /chair to bed;sit to stand/stand to sit  -DM     Transfer Training PT LTG, [place] Level  contact guard assist  -DM     Transfer Training PT LTG, Assist Device  walker, standard   NWB L LE; STABLE VS  -DM     Gait Training PT LTG    Gait Training Goal PT LTG, Date Established  02/14/17  -DM     Gait Training Goal PT LTG, Time to Achieve  1 wk  -DM     Gait Training Goal PT LTG, [place] Level  contact guard assist  -DM     Gait Training Goal PT LTG, Assist Device  walker, standard   NWB L LE, W/ stable VS  -DM     Gait Training Goal PT LTG, Distance to Achieve  25  -DM     Wound Care PT LTG    Wound Care PT LTG 1, Date Established 02/15/17  -MC      Wound Care PT LTG 1, Time to Achieve 1 wk  -MC      Wound Care PT LTG 1, Location L foot  -MC      Wound Care PT LTG 1, Decrease Wound Size 10%  -MC      Wound Care PT LTG 1, Decrease Necrotic Tissue 25%  -MC      Wound Care PT LTG 1, Increase Granulation To 10%  -MC      Wound Care PT LTG 1, Education wound care;progression of POC  -MC      Wound Care PT LTG 1, Education Understanding verbalize understanding  -        User Key  (r) = Recorded By, (t) = Taken By, (c) = Cosigned By    Initials Name Provider Type    MANUEL Crawford, PT Physical Therapist    OLGA De Jesus, PT Physical Therapist                Outcome Measures       02/14/17 1610          How much help from another person do  you currently need...    Turning from your back to your side while in flat bed without using bedrails? 4  -DM      Moving from lying on back to sitting on the side of a flat bed without bedrails? 4  -DM      Moving to and from a bed to a chair (including a wheelchair)? 3  -DM      Standing up from a chair using your arms (e.g., wheelchair, bedside chair)? 3  -DM      Climbing 3-5 steps with a railing? 1  -DM      To walk in hospital room? 3  -DM      AM-PAC 6 Clicks Score 18  -DM      Functional Assessment    Outcome Measure Options AM-PAC 6 Clicks Basic Mobility (PT)  -DM        User Key  (r) = Recorded By, (t) = Taken By, (c) = Cosigned By    Initials Name Provider Type    MANUEL Crawford, PT Physical Therapist              Time Calculation        PT Charges       02/15/17 1315          Time Calculation    Start Time 1315  -      PT Goal Re-Cert Due Date 02/24/17  -        User Key  (r) = Recorded By, (t) = Taken By, (c) = Cosigned By    Initials Name Provider Type     Marlin De Jesus, PT Physical Therapist            Therapy Charges for Today     Code Description Service Date Service Provider Modifiers Qty    07144878264 HC PT RE-EVAL ESTABLISHED PLAN 2 2/15/2017 Marlin De Jesus, PT GP 1    98608854792 HC PT NEG PRESS WOUND OVER 50SQCM DME1 2/15/2017 Marlin De Jesus, PT  1    49613401853 HC SANTINO DEBRIDE OPEN WOUND UP TO 20CM 2/15/2017 Marlin De Jesus, PT GP 1            PT G-Codes  Outcome Measure Options: AM-PAC 6 Clicks Basic Mobility (PT)       Marlin De Jesus, PT  2/15/2017

## 2017-02-15 NOTE — PROGRESS NOTES
[facility] Progress Note    2/13/2017      Antibiotics:  IV Anti-Infectives     Ordered     Dose/Rate Route Frequency Start Stop    02/15/17 1023  cefTRIAXone (ROCEPHIN) IVPB 2 g     Ordering Provider:  Usman Dong MD    2 g  over 30 Minutes Intravenous Daily 02/15/17 1200      02/15/17 1023  DAPTOmycin (CUBICIN) 750 mg in sodium chloride 0.9 % 50 mL IVPB     Ordering Provider:  Usman Dong MD    750 mg  100 mL/hr over 30 Minutes Intravenous Every 24 Hours 02/15/17 1200            CC: foot osteo    HPI:  Patient is a 46 y.o. Yr old male with history of history uncontrolled diabetes with extensive comorbidity as previously outlined. He has lower extremity vascular disease but no specific option for revascularization per discussion with Dr. Martinez. He was admitted January 2017 with left foot infection, MRI not confirming osteomyelitis, but had surgery on January 30 with metatarsals 3/4/5 amputated, marginal perfusion per operative note and MRSA/GB strep/coag-negative staph in his various cultures. He was transferred to [facility] on broad-spectrum IV antibiotics. While at [facility], he was under the care of Dr. Herrera and, per family/patient, he was told that he required hyperbaric oxygen therapy and had very little chance for long-term healing at the foot. He apparently was readmitted on February 13 to [facility] with patient interested in having higher level amputation.    2/15/17 He now wants to attempt salvage after d/w Dr Martinez as he does not want AKA unless he has given wound care/abx a longer trial;  He refuses hyperbaric at present but he will f/u with [facility] wound care to discuss options again;  He wants to simplify abx regimen to help facilitate home regimen with less frequent dosing to allow for help from family.     ROS:      2/15/17 No f/c/s. No n/v/d. No rash. No new ADR to Abx.     PE:   Visit Vitals   • /81 (BP Location: Left arm, Patient Position: Lying)   • Pulse 98   • Temp 98.5  "°F (36.9 °C) (Oral)   • Resp 18   • Ht 75\" (190.5 cm)   • Wt 277 lb 6.4 oz (126 kg)   • SpO2 97%   • BMI 34.67 kg/m2       GENERAL: Awake and alert, in no acute distress.   HEENT:  No conjunctival injection. No icterus. Oropharynx clear without evidence of thrush or exudate.     HEART: RRR; No murmur, rubs, gallops.   LUNGS: Clear to auscultation bilaterally without wheezing, rales, rhonchi. Normal respiratory effort. Nonlabored. No dullness.  ABDOMEN: Soft, nontender, nondistended. Positive bowel sounds. No rebound or guarding. NO mass or HSM.  EXT:  No cyanosis, clubbing or edema. No cord.  : Genitalia generally unremarkable.  Without Walker catheter.  SKIN: Warm and dry without cutaneous eruptions on Inspection/palpation.    Foot no new redness, purulence or necrosis;  No mass, buldge or fluctuance.  NO crepitus or bullae;  Large wound      Laboratory Data      Results from last 7 days  Lab Units 02/13/17 2033   WBC 10*3/mm3 11.15*   HEMOGLOBIN g/dL 10.9*   HEMATOCRIT % 33.5*   PLATELETS 10*3/mm3 236       Results from last 7 days  Lab Units 02/15/17  0636   SODIUM mmol/L 136   POTASSIUM mmol/L 4.1   CHLORIDE mmol/L 102   TOTAL CO2 mmol/L 30.0   BUN mg/dL 17   CREATININE mg/dL 0.60   GLUCOSE mg/dL 253*   CALCIUM mg/dL 8.8       Results from last 7 days  Lab Units 02/13/17 2033   ALK PHOS U/L 173*   BILIRUBIN mg/dL 0.4   ALT (SGPT) U/L 23   AST (SGOT) U/L 26               Estimated Creatinine Clearance: 215.1 mL/min (by C-G formula based on Cr of 0.6).      Microbiology:      Radiology:  Imaging Results (last 72 hours)     Procedure Component Value Units Date/Time    XR Chest 1 View [[id number]]      Updated:  02/13/17 2053            Impression:   --Acute/severe/deep infection involving the left foot requiring surgery on January 30 consistent with osteomyelitis surgically, associated with marginal perfusion from a gross standpoint at the time of surgery and no specific option to improve vascular flow per " discussion with Dr. Martinez, and has required metatarsals 3/4/5 resection. This is in the setting of significant comorbidity including uncontrolled diabetes. He has a high risk for chronic functional/limb loss including nonhealing and persistent/relapse/progression of infection that could include sepsis or more proximal spread in addition to metastatic foci of involvement. He has a risk for chronic pain and other disability associated with this. He and his family voice understanding these risks, they have considered the input from marta and Dr Martinez and they want to continue with abx/wound care for now. They refuse AKA for now but realize there is still a high chance for this. This option/timing/threshold for amputation will be up to Dr. Martinez in discussion with the family. He does not seem to have progressive infection at present, albeit with substantial tissue defect and high risk for further sequela as outlined above. He understands the risks of continued antibiotics as outlined below as well.      --Diabetes. Type II. You need to tightly control blood sugar to give best chance for healing      --History stage IV hepatic fibrosis and diagnosis DUNLAP     --PVD as above    PLAN:   -- IV daptomycin/rocephin    --I discussed potential risks and benefits of the prescribed antibiotics that include, but are not limited to, solid organ toxicity, neuro toxicity, renal toxicity, CDiff, cytopenias, pulm/muscle/neuro tox, hypersensitivity, etc.. Patient/Family voice understanding and agree to proceed.     --Monitor IV and IV antibiotics with risk for systemic complication and potential drug interaction     --Check/review labs cultures and scans     --Discussed with family and patient as outlined above regarding complexities involved with decision-making and future plans     --Discussed with microbiology     --History per nursing staff as well     --I discussed with Dr. Martinez directly        Usman Dong,  MD  2/15/2017

## 2017-02-15 NOTE — PLAN OF CARE
Problem: Patient Care Overview (Adult)  Goal: Plan of Care Review  Outcome: Ongoing (interventions implemented as appropriate)    02/15/17 1315   Coping/Psychosocial Response Interventions   Plan Of Care Reviewed With patient;spouse   Outcome Evaluation   Outcome Summary/Follow up Plan PT wound care evaluation complete. Pt with complex L foot wound s/p 3-5 transmetatarsal amputation with worsening of wound status after d/c from his recent inpt admission to [facility]. The wound has areas of dry, brown/black eschar and necrotic adipose, as well as bone and moist white tendon. Pt will benefit from debridement of necrotic tissue and NPWT to manage exudate, promote granulation tissue formation, and maintain a clean wound environment.         Problem: Inpatient Physical Therapy  Goal: Wound Care Goal 1 LTG- PT  Outcome: Ongoing (interventions implemented as appropriate)    02/15/17 1315   Wound Care PT LTG   Wound Care PT LTG 1, Date Established 02/15/17   Wound Care PT LTG 1, Time to Achieve 1 wk   Wound Care PT LTG 1, Location L foot   Wound Care PT LTG 1, Decrease Wound Size 10%   Wound Care PT LTG 1, Decrease Necrotic Tissue 25%   Wound Care PT LTG 1, Increase Granulation To 10%   Wound Care PT LTG 1, Education wound care;progression of POC   Wound Care PT LTG 1, Education Understanding verbalize understanding

## 2017-02-16 LAB
ALBUMIN SERPL-MCNC: 3.5 G/DL (ref 3.2–4.8)
ALBUMIN/GLOB SERPL: 1.1 G/DL (ref 1.5–2.5)
ALP SERPL-CCNC: 163 U/L (ref 25–100)
ALT SERPL W P-5'-P-CCNC: 21 U/L (ref 7–40)
ANION GAP SERPL CALCULATED.3IONS-SCNC: 7 MMOL/L (ref 3–11)
AST SERPL-CCNC: 22 U/L (ref 0–33)
BILIRUB SERPL-MCNC: 0.4 MG/DL (ref 0.3–1.2)
BUN BLD-MCNC: 16 MG/DL (ref 9–23)
BUN/CREAT SERPL: 32 (ref 7–25)
CALCIUM SPEC-SCNC: 9.3 MG/DL (ref 8.7–10.4)
CHLORIDE SERPL-SCNC: 100 MMOL/L (ref 99–109)
CK SERPL-CCNC: 80 U/L (ref 26–174)
CO2 SERPL-SCNC: 29 MMOL/L (ref 20–31)
CREAT BLD-MCNC: 0.5 MG/DL (ref 0.6–1.3)
DEPRECATED RDW RBC AUTO: 44.1 FL (ref 37–54)
ERYTHROCYTE [DISTWIDTH] IN BLOOD BY AUTOMATED COUNT: 14 % (ref 11.3–14.5)
GFR SERPL CREATININE-BSD FRML MDRD: >150 ML/MIN/1.73
GLOBULIN UR ELPH-MCNC: 3.3 GM/DL
GLUCOSE BLD-MCNC: 177 MG/DL (ref 70–100)
GLUCOSE BLDC GLUCOMTR-MCNC: 112 MG/DL (ref 70–130)
GLUCOSE BLDC GLUCOMTR-MCNC: 94 MG/DL (ref 70–130)
HCT VFR BLD AUTO: 32 % (ref 38.9–50.9)
HGB BLD-MCNC: 10.4 G/DL (ref 13.1–17.5)
MCH RBC QN AUTO: 28.1 PG (ref 27–31)
MCHC RBC AUTO-ENTMCNC: 32.5 G/DL (ref 32–36)
MCV RBC AUTO: 86.5 FL (ref 80–99)
PLATELET # BLD AUTO: 229 10*3/MM3 (ref 150–450)
PMV BLD AUTO: 10.1 FL (ref 6–12)
POTASSIUM BLD-SCNC: 4 MMOL/L (ref 3.5–5.5)
PROT SERPL-MCNC: 6.8 G/DL (ref 5.7–8.2)
RBC # BLD AUTO: 3.7 10*6/MM3 (ref 4.2–5.76)
SODIUM BLD-SCNC: 136 MMOL/L (ref 132–146)
WBC NRBC COR # BLD: 10.43 10*3/MM3 (ref 3.5–10.8)

## 2017-02-16 PROCEDURE — 99233 SBSQ HOSP IP/OBS HIGH 50: CPT | Performed by: INTERNAL MEDICINE

## 2017-02-16 PROCEDURE — 25010000002 DAPTOMYCIN PER 1 MG: Performed by: INTERNAL MEDICINE

## 2017-02-16 PROCEDURE — 82962 GLUCOSE BLOOD TEST: CPT

## 2017-02-16 PROCEDURE — 85027 COMPLETE CBC AUTOMATED: CPT | Performed by: INTERNAL MEDICINE

## 2017-02-16 PROCEDURE — 63710000001 INSULIN DETEMIR PER 5 UNITS: Performed by: NURSE PRACTITIONER

## 2017-02-16 PROCEDURE — 82550 ASSAY OF CK (CPK): CPT | Performed by: INTERNAL MEDICINE

## 2017-02-16 PROCEDURE — 80053 COMPREHEN METABOLIC PANEL: CPT | Performed by: INTERNAL MEDICINE

## 2017-02-16 PROCEDURE — 25010000002 HEPARIN (PORCINE) PER 1000 UNITS: Performed by: INTERNAL MEDICINE

## 2017-02-16 PROCEDURE — 94799 UNLISTED PULMONARY SVC/PX: CPT

## 2017-02-16 PROCEDURE — 97116 GAIT TRAINING THERAPY: CPT

## 2017-02-16 PROCEDURE — 97110 THERAPEUTIC EXERCISES: CPT

## 2017-02-16 PROCEDURE — 25010000003 CEFTRIAXONE PER 250 MG: Performed by: INTERNAL MEDICINE

## 2017-02-16 PROCEDURE — 97606 NEG PRS WND THER DME>50 SQCM: CPT

## 2017-02-16 RX ADMIN — CEFTRIAXONE SODIUM 2 G: 2 INJECTION, SOLUTION INTRAVENOUS at 10:33

## 2017-02-16 RX ADMIN — INSULIN LISPRO 2 UNITS: 100 INJECTION, SOLUTION INTRAVENOUS; SUBCUTANEOUS at 08:43

## 2017-02-16 RX ADMIN — DAPTOMYCIN 750 MG: 500 INJECTION, POWDER, LYOPHILIZED, FOR SOLUTION INTRAVENOUS at 12:42

## 2017-02-16 RX ADMIN — ASPIRIN 81 MG: 81 TABLET, COATED ORAL at 10:32

## 2017-02-16 RX ADMIN — INSULIN LISPRO 10 UNITS: 100 INJECTION, SOLUTION INTRAVENOUS; SUBCUTANEOUS at 17:18

## 2017-02-16 RX ADMIN — HYDROCODONE BITARTRATE AND ACETAMINOPHEN 1 TABLET: 10; 325 TABLET ORAL at 21:14

## 2017-02-16 RX ADMIN — GABAPENTIN 1200 MG: 300 CAPSULE ORAL at 16:24

## 2017-02-16 RX ADMIN — DOCUSATE SODIUM 100 MG: 100 CAPSULE, LIQUID FILLED ORAL at 10:32

## 2017-02-16 RX ADMIN — INSULIN LISPRO 10 UNITS: 100 INJECTION, SOLUTION INTRAVENOUS; SUBCUTANEOUS at 12:35

## 2017-02-16 RX ADMIN — GABAPENTIN 1200 MG: 300 CAPSULE ORAL at 06:39

## 2017-02-16 RX ADMIN — LOSARTAN POTASSIUM 100 MG: 50 TABLET, FILM COATED ORAL at 12:42

## 2017-02-16 RX ADMIN — GABAPENTIN 1200 MG: 300 CAPSULE ORAL at 21:14

## 2017-02-16 RX ADMIN — HEPARIN SODIUM 5000 UNITS: 5000 INJECTION, SOLUTION INTRAVENOUS; SUBCUTANEOUS at 10:32

## 2017-02-16 RX ADMIN — INSULIN LISPRO 10 UNITS: 100 INJECTION, SOLUTION INTRAVENOUS; SUBCUTANEOUS at 08:42

## 2017-02-16 RX ADMIN — HEPARIN SODIUM 5000 UNITS: 5000 INJECTION, SOLUTION INTRAVENOUS; SUBCUTANEOUS at 21:14

## 2017-02-16 RX ADMIN — INSULIN DETEMIR 30 UNITS: 100 INJECTION, SOLUTION SUBCUTANEOUS at 08:48

## 2017-02-16 RX ADMIN — FAMOTIDINE 20 MG: 20 TABLET ORAL at 10:32

## 2017-02-16 RX ADMIN — INSULIN DETEMIR 30 UNITS: 100 INJECTION, SOLUTION SUBCUTANEOUS at 21:15

## 2017-02-16 RX ADMIN — HYDROCODONE BITARTRATE AND ACETAMINOPHEN 1 TABLET: 10; 325 TABLET ORAL at 08:46

## 2017-02-16 RX ADMIN — Medication 1 CAPSULE: at 10:31

## 2017-02-16 NOTE — PROGRESS NOTES
Continued Stay Note  [facility]     Patient Name: Kendrick Crystal  MRN: [id number]  Today's Date: 2/16/2017    Admit Date: 2/13/2017          Discharge Plan       02/16/17 1554    Case Management/Social Work Plan    Plan Home with HH    Patient/Family In Agreement With Plan yes    Additional Comments No finalized home IV abx orders rec'd to date, however, pt/wife have chosen [facility] to provide meds.  CM has contacted [facility] regarding cost of meds.  If pt is DCd with Dapto 750mg Q24h and Rocephin 2g Q24h (current hospital doses) there is NO CO-PAY (meds covered at 100%).  Will need ID to finalize abx orders prior to DC.  Discussed need for HH and from a list of providers, they have chosen [facility].  Pt will also DC with a wound vac.  Dr. Martinez will need to sign the APRIA order form so the wound vac can be ordered prior to DC (faxed copy to his office and placed a copy on chart).  Discussed with pt and wife that insurance will not pay for a standard WC and walker if the goal is to obtain a motorized WC.  Per wife, she will borrow a WC and rent a knee walker if necessary.  She will contact PCP to begin application process for a motorized WC.  They have also requested a hospital bed, however, there is currently no diagnosis to warrant the need and therefore, will not be paid by Medicaid.  Informed them that this item can also be rented from a [employer] company.  CM will cont to follow to finalized all DC needs.              Discharge Codes     None            Christelle Cuevas

## 2017-02-16 NOTE — PROGRESS NOTES
[facility] Medicine Services  INPATIENT PROGRESS NOTE    Date of Admission: 2/13/2017  Length of Stay: 2  Primary Care Physician: Juan Rios MD    Subjective     Chief Complaint: foot ulceration  HPI:    Wound vac and picc line placed today in anticipation of discharge home. Pain well controlled  Patient complains of dry cough present ever since starting lisinopril during last admission.      Review Of Systems:   Review of Systems   Constitutional: Negative.    HENT: Negative.    Eyes: Negative.    Respiratory: Positive for cough.    Cardiovascular: Negative.    Gastrointestinal: Negative.    Endocrine: Negative.    Genitourinary: Negative.    Musculoskeletal: Positive for myalgias.   Skin: Positive for wound.   Neurological: Negative.    Hematological: Negative.    Psychiatric/Behavioral: Negative.          Objective      Temp:  [98.2 °F (36.8 °C)-98.5 °F (36.9 °C)] 98.2 °F (36.8 °C)  Heart Rate:  [] 104  Resp:  [16-18] 16  BP: (147-162)/(81-97) 162/97  Physical Exam    Alert, oriented, lying in bed  PICC RUE  obese  RRR, no murmur rub or gallop  CTAB  Abdomen soft, non tender, non distended, normal bowel sounds  Left foot with wound vac  Normal affect  Moves all extremities    Results Review:    I have reviewed the labs, radiology results and diagnostic studies.      Results from last 7 days  Lab Units 02/13/17  2033   WBC 10*3/mm3 11.15*   HEMOGLOBIN g/dL 10.9*   PLATELETS 10*3/mm3 236       Results from last 7 days  Lab Units 02/15/17  0636   SODIUM mmol/L 136   POTASSIUM mmol/L 4.1   TOTAL CO2 mmol/L 30.0   CREATININE mg/dL 0.60   GLUCOSE mg/dL 253*       Culture Data:   BLOOD CULTURE   Date Value Ref Range Status   02/13/2017 No growth at less than 24 hours  Preliminary   02/13/2017 No growth at less than 24 hours  Preliminary     Radiology Data:     I have reviewed the medications.    aspirin 81 mg Oral Daily   ceftriaxone 2 g Intravenous Daily   DAPTOmycin (CUBICIN)  IV  750 mg Intravenous Q24H   docusate sodium 100 mg Oral BID   famotidine 20 mg Oral BID   gabapentin 1,200 mg Oral Q8H   heparin (porcine) 5,000 Units Subcutaneous Q12H   insulin detemir 30 Units Subcutaneous Q12H   insulin lispro 10 Units Subcutaneous TID With Meals   insulin lispro 2-7 Units Subcutaneous 4x Daily AC & at Bedtime   [START ON 2/16/2017] losartan 100 mg Oral Q24H   polyethylene glycol 17 g Oral Daily   probiotic 1 capsule Oral Daily       Assessment/Plan     Assessment/Problem List  Principal Problem:    Diabetic foot ulcer associated with diabetes mellitus due to underlying condition  Active Problems:    Essential hypertension    Hypertriglyceridemia, essential    Poorly controlled type 2 diabetes mellitus with peripheral neuropathy    Type 2 diabetes mellitus with foot ulcer, with long-term current use of insulin    DUNLAP (nonalcoholic steatohepatitis)    Hyponatremia    Hypomagnesemia    Diabetic foot ulcer           Plan    Diabetic foot ulcer  - wound vac and IV abx, picc placed today, with likely dc home tomorrow  - patient will fu with both Ct surgery Michelle and NO Dong to assess clinical progress - if poor wound healing may eventually need AKA    DMII  - poor home control, discussed need for tighter glucose regulation at eventual discharge  - will need to make sure he has meter, etc prior to discharge  - appears he follows outpatient with Dr Mcdonald - will need close followup    DUNLAP with stage IV hepatic fibrosis    Dyslipidemia    HTN    Peripheral Neuropathy     DVT prophylaxis: heparin  Discharge Planning: I expect patient to be discharged to home in 1-2 days.    Rafy Caro MD   02/15/17   8:21 PM    Please note that portions of this note may have been completed with a voice recognition program. Efforts were made to edit the dictations, but occasionally words are mistranscribed.

## 2017-02-16 NOTE — PLAN OF CARE
Problem: Patient Care Overview (Adult)  Goal: Plan of Care Review  Outcome: Ongoing (interventions implemented as appropriate)    02/16/17 0815   Coping/Psychosocial Response Interventions   Plan Of Care Reviewed With patient   Outcome Evaluation   Outcome Summary/Follow up Plan wound vac with small seal leak, but able to seal without difficulty. Will cont with wound vac and change in 1-2 days.          Problem: Inpatient Physical Therapy  Goal: Wound Care Goal 1 LTG- PT  Outcome: Ongoing (interventions implemented as appropriate)    02/15/17 1315   Wound Care PT LTG   Wound Care PT LTG 1, Date Established 02/15/17   Wound Care PT LTG 1, Time to Achieve 1 wk   Wound Care PT LTG 1, Location L foot   Wound Care PT LTG 1, Decrease Wound Size 10%   Wound Care PT LTG 1, Decrease Necrotic Tissue 25%   Wound Care PT LTG 1, Increase Granulation To 10%   Wound Care PT LTG 1, Education wound care;progression of POC   Wound Care PT LTG 1, Education Understanding verbalize understanding

## 2017-02-16 NOTE — PROGRESS NOTES
[facility] Progress Note    2/13/2017      Antibiotics:  IV Anti-Infectives     Ordered     Dose/Rate Route Frequency Start Stop    02/15/17 1023  cefTRIAXone (ROCEPHIN) IVPB 2 g     Ordering Provider:  Usman Dong MD    2 g  over 30 Minutes Intravenous Daily 02/15/17 1200      02/15/17 1023  DAPTOmycin (CUBICIN) 750 mg in sodium chloride 0.9 % 50 mL IVPB     Ordering Provider:  Usman Dong MD    750 mg  100 mL/hr over 30 Minutes Intravenous Every 24 Hours 02/15/17 1200            CC: foot osteo    HPI:  Patient is a 46 y.o. Yr old male with history of history uncontrolled diabetes with extensive comorbidity as previously outlined. He has lower extremity vascular disease but no specific option for revascularization per discussion with Dr. Martinez. He was admitted January 2017 with left foot infection, MRI not confirming osteomyelitis, but had surgery on January 30 with metatarsals 3/4/5 amputated, marginal perfusion per operative note and MRSA/GB strep/coag-negative staph in his various cultures. He was transferred to [facility] on broad-spectrum IV antibiotics. While at [facility], he was under the care of Dr. Herrera and, per family/patient, he was told that he required hyperbaric oxygen therapy and had very little chance for long-term healing at the foot. He apparently was readmitted on February 13 to [facility] with patient interested in having higher level amputation.    2/15 He now wants to attempt salvage after d/w Dr Martinez as he does not want AKA unless he has given wound care/abx a longer trial;  He refuses hyperbaric at present but he will f/u with [facility] wound care to discuss options again    2/16/17   He wants current abx regimen to help facilitate home regimen with less frequent dosing to allow for help from family. I d/w him pros/cons of vanco -v- dapto -v- zyvox and relative risks/micro results/risk of drug interaction/risk of side effects, etc...  Lab carol did not do dapto EYAD from MRSA  "isolate and while in general he does not have high risk for dapto resistance, I am unable to confirm sensitivity;  Nonetheless, he prefers dapto for ease of home administration and prefers avoid further risk of other agents;  He voices understanding the limitations/risks /challenges of each approach and he prefers daptomycin/rocephin.  If not improving with this approach, he knows he could reconsider alternative approach.    ROS:      2/16/17 No f/c/s. No n/v/d. No rash. No new ADR to Abx.     PE:   Visit Vitals   • /92 (BP Location: Left arm, Patient Position: Lying)   • Pulse 104   • Temp 98 °F (36.7 °C) (Oral)   • Resp 18   • Ht 75\" (190.5 cm)   • Wt 271 lb 12.8 oz (123 kg)   • SpO2 97%   • BMI 33.97 kg/m2       GENERAL: Awake and alert, in no acute distress.   HEENT:  No conjunctival injection. No icterus. Oropharynx clear without evidence of thrush or exudate.     HEART: RRR; No murmur, rubs, gallops.   LUNGS: Clear to auscultation bilaterally without wheezing, rales, rhonchi. Normal respiratory effort. Nonlabored. No dullness.  ABDOMEN: Soft, nontender, nondistended. Positive bowel sounds. No rebound or guarding. NO mass or HSM.  EXT:  No cyanosis, clubbing or edema. No cord.  : Genitalia generally unremarkable.  Without Walker catheter.  SKIN: Warm and dry without cutaneous eruptions on Inspection/palpation.    Foot no new redness, purulence or necrosis;  No mass, buldge or fluctuance.  NO crepitus or bullae;  Large wound      Laboratory Data      Results from last 7 days  Lab Units 02/16/17  0557 02/13/17  2033   WBC 10*3/mm3 10.43 11.15*   HEMOGLOBIN g/dL 10.4* 10.9*   HEMATOCRIT % 32.0* 33.5*   PLATELETS 10*3/mm3 229 236       Results from last 7 days  Lab Units 02/16/17  0557   SODIUM mmol/L 136   POTASSIUM mmol/L 4.0   CHLORIDE mmol/L 100   TOTAL CO2 mmol/L 29.0   BUN mg/dL 16   CREATININE mg/dL 0.50*   GLUCOSE mg/dL 177*   CALCIUM mg/dL 9.3       Results from last 7 days  Lab Units 02/16/17  0557 "   ALK PHOS U/L 163*   BILIRUBIN mg/dL 0.4   ALT (SGPT) U/L 21   AST (SGOT) U/L 22               Estimated Creatinine Clearance: 255.3 mL/min (by C-G formula based on Cr of 0.5).      Microbiology:      Radiology:  Imaging Results (last 72 hours)     Procedure Component Value Units Date/Time    XR Chest 1 View [[id number]]      Updated:  02/13/17 2053            Impression:   --Acute/severe/deep infection involving the left foot requiring surgery on January 30 consistent with osteomyelitis surgically, associated with marginal perfusion from a gross standpoint at the time of surgery and no specific option to improve vascular flow per discussion with Dr. Martinez, and has required metatarsals 3/4/5 resection. This is in the setting of significant comorbidity including uncontrolled diabetes. He has a high risk for chronic functional/limb loss including nonhealing and persistent/relapse/progression of infection that could include sepsis or more proximal spread in addition to metastatic foci of involvement. He has a risk for chronic pain and other disability associated with this. He and his family voice understanding these risks, they have considered the input from marta and Dr Martinez and they want to continue with abx/wound care for now. They refuse AKA for now but realize there is still a high chance for this. This option/timing/threshold for amputation will be up to Dr. Martinez in discussion with the family. He does not seem to have progressive infection at present, albeit with substantial tissue defect and high risk for further sequela as outlined above. He understands the risks of continued antibiotics as outlined below as well.      --Diabetes. Type II. You need to tightly control blood sugar to give best chance for healing      --History stage IV hepatic fibrosis and diagnosis DUNLAP     --PVD as above    PLAN:   -- IV daptomycin/rocephin    --I discussed potential risks and benefits of the prescribed antibiotics that  include, but are not limited to, solid organ toxicity, neuro toxicity, renal toxicity, CDiff, cytopenias, pulm/muscle/neuro tox, hypersensitivity, etc.. Patient/Family voice understanding and agree to proceed.     --Monitor IV and IV antibiotics with risk for systemic complication and potential drug interaction     --Check/review labs cultures and scans     --Discussed with family and patient as outlined above regarding complexities involved with decision-making and future plans     --Discussed with microbiology at [facility] and [facility] as above     --History per nursing staff as well     --I discussed with Dr. Martinez directly        Usman Dong MD  2/16/2017

## 2017-02-16 NOTE — PROGRESS NOTES
Cardiothoracic Surgery Progress Note      1/30/17   Left 5/4/3 transmetatarsal amputation        LOS: 3 days      Subjective:  RUE PICC placed yesterday without complications. Wound vac leaking this am.     Objective:  Vital Signs  Temp:  [98 °F (36.7 °C)-98.2 °F (36.8 °C)] 98 °F (36.7 °C)  Heart Rate:  [104] 104  Resp:  [16-18] 18  BP: (156-162)/(92-97) 156/92    Physical Exam:   General Appearance: alert, appears stated age and cooperative   Lungs: clear to auscultation, respirations regular, respirations even and respirations unlabored   Heart: regular rhythm & normal rate, normal S1, S2 and no murmur, no clau, no rub   Skin: Left foot wound with no surrounding erythema. Wound vac in place with air leak.      Results:    Results from last 7 days  Lab Units 02/13/17  2033   WBC 10*3/mm3 11.15*   HEMOGLOBIN g/dL 10.9*   HEMATOCRIT % 33.5*   PLATELETS 10*3/mm3 236       Results from last 7 days  Lab Units 02/15/17  0636   SODIUM mmol/L 136   POTASSIUM mmol/L 4.1   CHLORIDE mmol/L 102   TOTAL CO2 mmol/L 30.0   BUN mg/dL 17   CREATININE mg/dL 0.60   GLUCOSE mg/dL 253*   CALCIUM mg/dL 8.8         Assessment:  Left lower extremity wound status post 5/4/3 transmetatarsal amputation.  Marginal healing.      Plan:  Wound vac  Continue ABX as per ID. PICC in place.   Hold on Left AKA  Discharge home if home health set up and all agree

## 2017-02-16 NOTE — PLAN OF CARE
Problem: Patient Care Overview (Adult)  Goal: Plan of Care Review  Outcome: Ongoing (interventions implemented as appropriate)    02/16/17 0952   Coping/Psychosocial Response Interventions   Plan Of Care Reviewed With patient   Patient Care Overview   Progress improving   Outcome Evaluation   Outcome Summary/Follow up Plan pt requested knee walker did better with it,increased gait,did exercises for rt le         Problem: Inpatient Physical Therapy  Goal: Bed Mobility Goal LTG- PT  Outcome: Outcome(s) achieved Date Met:  02/16/17 02/14/17 1806 02/16/17 0952   Bed Mobility PT LTG   Bed Mobility PT LTG, Date Established 02/14/17 --    Bed Mobility PT LTG, Time to Achieve 1 wk --    Bed Mobility PT LTG, Activity Type all bed mobility --    Bed Mobility PT LTG, [place] Level independent --    Bed Mobility PT LTG, Outcome --  goal met       Goal: Transfer Training Goal 1 LTG- PT  Outcome: Outcome(s) achieved Date Met:  02/16/17 02/14/17 1806 02/16/17 0952   Transfer Training PT LTG   Transfer Training PT LTG, Date Established 02/14/17 --    Transfer Training PT LTG, Time to Achieve 1 wk --    Transfer Training PT LTG, Activity Type bed to chair /chair to bed;sit to stand/stand to sit --    Transfer Training PT LTG, [place] Level contact guard assist --    Transfer Training PT LTG, Assist Device walker, standard  (NWB L LE; STABLE VS) --    Transfer Training PT LTG, Outcome --  goal met       Goal: Gait Training Goal LTG- PT  Outcome: Outcome(s) achieved Date Met:  02/16/17 02/14/17 1806 02/16/17 0952   Gait Training PT LTG   Gait Training Goal PT LTG, Date Established 02/14/17 --    Gait Training Goal PT LTG, Time to Achieve 1 wk --    Gait Training Goal PT LTG, [place] Level contact guard assist --    Gait Training Goal PT LTG, Assist Device walker, standard  (NWB L LE, W/ stable VS) --    Gait Training Goal PT LTG, Distance to Achieve 25 --    Gait Training Goal PT LTG, Outcome --  goal met

## 2017-02-16 NOTE — PROGRESS NOTES
Acute Care - Physical Therapy Treatment Note  [facility]     Patient Name: Kendrick Crystal  : 1970  MRN: [id number]  Today's Date: 2017  Onset of Illness/Injury or Date of Surgery Date: 17  Date of Referral to PT: 17  Referring Physician: MD Michelle    Admit Date: 2017    Visit Dx:    ICD-10-CM ICD-9-CM   1. Impaired functional mobility, balance, gait, and endurance Z74.09 V49.89     Patient Active Problem List   Diagnosis   • Cirrhosis   • Essential hypertension   • Non-compliance   • Type 2 diabetes mellitus with hyperosmolarity without nonketotic hyperglycemic-hyperosmolar coma (nkhhc)   • Arthritis   • Hyperlipemia   • Hypertriglyceridemia, essential   • Diabetic foot ulcer associated with diabetes mellitus due to underlying condition   • Cellulitis of left foot   • Poorly controlled type 2 diabetes mellitus with peripheral neuropathy   • Type 2 diabetes mellitus with foot ulcer, with long-term current use of insulin   • DUNLAP (nonalcoholic steatohepatitis)   • Hyponatremia   • Neutrophilic leukocytosis   • Hypomagnesemia   • Diabetic foot ulcer   • Encephalopathy, hepatic   • Hypertension               Adult Rehabilitation Note       17 0900          Rehab Assessment/Intervention    Discipline physical therapy assistant  -UD      Document Type therapy note (daily note)  -UD      Subjective Information agree to therapy;complains of;pain  -UD      Patient Effort, Rehab Treatment good  -UD      Symptoms Noted During/After Treatment fatigue;increased pain  -UD      Precautions/Limitations non-weight bearing status  -UD      Equipment Issued to Patient --   knee walker  -UD      Recorded by [UD] Hali Valadez PTA      Vital Signs    O2 Delivery Post Treatment room air  -UD      Pre Patient Position Supine  -UD      Intra Patient Position Standing  -UD      Post Patient Position Supine  -UD      Recorded by [UD] Hali Valadez PTA      Pain Assessment    Pain Assessment 0-10  -UD       Pain Score 2  -UD      Post Pain Score 4  -UD      Pain Type Acute pain  -UD      Pain Location Foot  -UD      Pain Orientation Left  -UD      Pain Intervention(s) Repositioned  -UD      Recorded by [UD] Hali Valadez PTA      Cognitive Assessment/Intervention    Orientation Status oriented x 4  -UD      Follows Commands/Answers Questions 100% of the time  -UD      Recorded by [UD] Hali Valadez PTA      Bed Mobility, Assessment/Treatment    Bed Mob, Supine to Sit, [place] conditional independence  -UD      Bed Mob, Sit to Supine, [place] conditional independence  -UD      Recorded by [UD] Hali Valadez PTA      Transfer Assessment/Treatment    Transfers, Sit-Stand [place] stand by assist  -UD      Transfers, Stand-Sit [place] stand by assist  -UD      Transfers, Sit-Stand-Sit, Assist Device --   knee walker  -UD      Recorded by [UD] Hali Valadez PTA      Gait Assessment/Treatment    Gait, [place] Level contact guard assist;1 person + 1 person to manage equipment  -UD      Gait, Assistive Device --   knee walker  -UD      Gait, Distance (Feet) 100  -UD      Gait, Impairments pain  -UD      Recorded by [UD] Hali Valadez PTA      Therapy Exercises    Right Lower Extremity AROM:;10 reps;supine  -UD      Recorded by [UD] Hali Valadez PTA      Positioning and Restraints    Pre-Treatment Position in bed  -UD      Post Treatment Position bed  -UD      In Bed supine;notified nsg;call light within reach;side rails up x2  -UD      Recorded by [UD] Hali Valadez PTA        User Key  (r) = Recorded By, (t) = Taken By, (c) = Cosigned By    Initials Name Effective Dates    UD Hali Valadez PTA 06/22/15 -                 IP PT Goals       02/16/17 0952 02/15/17 1315 02/14/17 1806    Bed Mobility PT LTG    Bed Mobility PT LTG, Date Established   02/14/17  -DM    Bed Mobility PT LTG, Time to Achieve   1 wk  -DM    Bed Mobility PT LTG, Activity Type   all bed mobility  -DM    Bed Mobility PT LTG,  [place] Level   independent  -DM    Bed Mobility PT LTG, Outcome goal met  -UD      Transfer Training PT LTG    Transfer Training PT LTG, Date Established   02/14/17  -DM    Transfer Training PT LTG, Time to Achieve   1 wk  -DM    Transfer Training PT LTG, Activity Type   bed to chair /chair to bed;sit to stand/stand to sit  -DM    Transfer Training PT LTG, [place] Level   contact guard assist  -DM    Transfer Training PT LTG, Assist Device   walker, standard   NWB L LE; STABLE VS  -DM    Transfer Training PT LTG, Outcome goal met  -UD      Gait Training PT LTG    Gait Training Goal PT LTG, Date Established   02/14/17  -DM    Gait Training Goal PT LTG, Time to Achieve   1 wk  -DM    Gait Training Goal PT LTG, [place] Level   contact guard assist  -DM    Gait Training Goal PT LTG, Assist Device   walker, standard   NWB L LE, W/ stable VS  -DM    Gait Training Goal PT LTG, Distance to Achieve   25  -DM    Gait Training Goal PT LTG, Outcome goal met  -UD      Wound Care PT LTG    Wound Care PT LTG 1, Date Established  02/15/17  -MC     Wound Care PT LTG 1, Time to Achieve  1 wk  -MC     Wound Care PT LTG 1, Location  L foot  -MC     Wound Care PT LTG 1, Decrease Wound Size  10%  -MC     Wound Care PT LTG 1, Decrease Necrotic Tissue  25%  -MC     Wound Care PT LTG 1, Increase Granulation To  10%  -MC     Wound Care PT LTG 1, Education  wound care;progression of POC  -MC     Wound Care PT LTG 1, Education Understanding  verbalize understanding  -MC       User Key  (r) = Recorded By, (t) = Taken By, (c) = Cosigned By    Initials Name Provider Type    HUNTER Valadez, PTA Physical Therapy Assistant    MANUEL Crawford, PT Physical Therapist    OLGA De Jesus, PT Physical Therapist          Physical Therapy Education     Title: PT OT SLP Therapies (Active)     Topic: Physical Therapy (Active)     Point: Mobility training (Active)    Learning Progress Summary    Learner Readiness Method Response  Comment Documented by Status   Patient Acceptance E,D VU,NR   02/16/17 0952 Done    Eager D,E NR   02/14/17 1806 Active   Significant Other Eager D,E NR   02/14/17 1806 Active               Point: Home exercise program (Active)    Learning Progress Summary    Learner Readiness Method Response Comment Documented by Status   Patient Acceptance E,D VU,NR   02/16/17 0952 Done    Eager D,E NR   02/14/17 1806 Active   Significant Other Eager D,E NR   02/14/17 1806 Active               Point: Body mechanics (Active)    Learning Progress Summary    Learner Readiness Method Response Comment Documented by Status   Patient Acceptance E,D VU,NR   02/16/17 0952 Done    Eager D,E NR   02/14/17 1806 Active   Significant Other Eager D,E NR   02/14/17 1806 Active               Point: Precautions (Active)    Learning Progress Summary    Learner Readiness Method Response Comment Documented by Status   Patient Acceptance E,D VU,NR   02/16/17 0952 Done    Eager D,E NR   02/14/17 1806 Active   Significant Other Eager D,E NR   02/14/17 1806 Active                      User Key     Initials Effective Dates Name Provider Type Discipline     06/22/15 -  Hali Valadez PTA Physical Therapy Assistant PT     06/19/15 -  Kelly Crawford, PT Physical Therapist PT                    PT Recommendation and Plan  Anticipated Equipment Needs At Discharge: gait belt, walker, rolling knee walker (std wx vs. knee wx)  Anticipated Discharge Disposition: home with home health (per pt/spouse, they want to go home)  PT Frequency: daily  Plan of Care Review  Plan Of Care Reviewed With: patient  Progress: improving  Outcome Summary/Follow up Plan: pt requested knee walker did better with it,increased gait,did exercises for rt le          Outcome Measures       02/16/17 0900 02/14/17 1610       How much help from another person do you currently need...    Turning from your back to your side while in flat bed without using bedrails? 4   -UD 4  -DM     Moving from lying on back to sitting on the side of a flat bed without bedrails? 4  -UD 4  -DM     Moving to and from a bed to a chair (including a wheelchair)? 3  -UD 3  -DM     Standing up from a chair using your arms (e.g., wheelchair, bedside chair)? 3  -UD 3  -DM     Climbing 3-5 steps with a railing? 1  -UD 1  -DM     To walk in hospital room? 3  -UD 3  -DM     AM-PAC 6 Clicks Score 18  -UD 18  -DM     Functional Assessment    Outcome Measure Options  AM-PAC 6 Clicks Basic Mobility (PT)  -DM       User Key  (r) = Recorded By, (t) = Taken By, (c) = Cosigned By    Initials Name Provider Type    UD Hali Valadez PTA Physical Therapy Assistant    MANUEL Crawford, PT Physical Therapist           Time Calculation:         PT Charges       02/16/17 0955          Time Calculation    PT Received On 02/16/17  -      PT Goal Re-Cert Due Date 02/24/17  -      Time Calculation- PT    Total Timed Code Minutes- PT 25 minute(s)  -UD        User Key  (r) = Recorded By, (t) = Taken By, (c) = Cosigned By    Initials Name Provider Type    UD Hali Valadez PTA Physical Therapy Assistant          Therapy Charges for Today     Code Description Service Date Service Provider Modifiers Qty    94525008457 HC PT THER PROC EA 15 MIN 2/16/2017 Hali Valadez PTA GP 1    91035356326 HC GAIT TRAINING EA 15 MIN 2/16/2017 Hali Valadez PTA GP 1    15727992692 HC PT THER SUPP EA 15 MIN 2/16/2017 Hali Valadez PTA GP 1          PT G-Codes  Outcome Measure Options: AM-PAC 6 Clicks Basic Mobility (PT)    Hali Valadez PTA  2/16/2017

## 2017-02-16 NOTE — PROGRESS NOTES
Acute Care - Wound/Debridement Treatment Note  [facility]     Patient Name: Kendrick Crystal  : 1970  MRN: [id number]  Today's Date: 2017  Onset of Illness/Injury or Date of Surgery Date: 17   Date of Referral to PT: 17   Referring Physician: MD Michelle       Admit Date: 2017    Visit Dx:    ICD-10-CM ICD-9-CM   1. Impaired functional mobility, balance, gait, and endurance Z74.09 V49.89       Patient Active Problem List   Diagnosis   • Cirrhosis   • Essential hypertension   • Non-compliance   • Type 2 diabetes mellitus with hyperosmolarity without nonketotic hyperglycemic-hyperosmolar coma (nkhhc)   • Arthritis   • Hyperlipemia   • Hypertriglyceridemia, essential   • Diabetic foot ulcer associated with diabetes mellitus due to underlying condition   • Cellulitis of left foot   • Poorly controlled type 2 diabetes mellitus with peripheral neuropathy   • Type 2 diabetes mellitus with foot ulcer, with long-term current use of insulin   • DUNLAP (nonalcoholic steatohepatitis)   • Hyponatremia   • Neutrophilic leukocytosis   • Hypomagnesemia   • Diabetic foot ulcer   • Encephalopathy, hepatic   • Hypertension               LDA Wound       17 0815 02/15/17 1315       Wound 17 0815 Left lateral foot other (see comments)    Wound - Properties Group Date first assessed: 17  -MF Time first assessed: 815  -MF Side: Left  -MF Orientation: lateral  -MF Location: foot  -MF Type: other (see comments)  -MF, 3rd, 4th, and 5th toe amputation site     Wound WDL WDL  -MF ex   periwound redness  -MC     Dressing Appearance no drainage;dry;intact  -MF moist drainage;intact   previous wet-to-dry dressing  -     Base unable to visualize  -MF black eschar;bone;white;tendon;yellow;slough;pink   Dry areas of eschar along edge/2nd toe, moist proximally  -MC     Periwound Area  intact;dry;redness  -MC     Edges  open;irregular  -MC     Length (cm)  14.2  -MC     Width (cm)  4.7  -MC     Depth (cm)   3.8   at widest part of foot to perpendicular depth  -     Tunneling [depth (cm)/Location]  1.7 cm @ 12:00  -     Drainage Characteristics/Odor  serosanguineous;malodorous  -     Drainage Amount  small  -MC     Wound Cleaning  cleansed with;other (see comments)   phase one wound cleanser  -     Wound Interventions  debrided;other (see comments)   wound vac  -MC     Dressing  other (see comments)   wound vac  -MC     Periwound Care  cleansed with pH balanced cleanser;dry periwound area maintained;barrier film applied;other (see comments)   NoSting, stomapaste, drape border  -     Therapy Setting (Negative Pressure Wound Therapy) continuous therapy   vac leaking, removed some tape and reapplied to obtain seal  - continuous therapy   VC 10mL Nsaline q3.5h, 1 minute soak  -     Pressure Setting (Negative Pressure Wound Therapy) 125 mmHg  - mmHg  -MC     Dressing (Negative Pressure Wound Therapy)  foam, silver;gauze, nonadherent  -MC     Sponges Inserted (Negative Pressure Wound Therapy)  1;other (see comments)   1 adaptic, 1 silver  -MC     Sponges Removed (Negative Pressure Wound Therapy)  other (see comments)   removed all wet-to-dry packing  -     Output (mL) 50  -MF 0  -MC       User Key  (r) = Recorded By, (t) = Taken By, (c) = Cosigned By    Initials Name Provider Type     Usman Johnson, PT Physical Therapist     Marlin De Jesus, PT Physical Therapist            WOUND DEBRIDEMENT                     Adult Rehabilitation Note       02/16/17 0900 02/16/17 0815       Rehab Assessment/Intervention    Discipline physical therapy assistant  -UD physical therapist  -     Document Type therapy note (daily note)  -UD therapy note (daily note)  -     Subjective Information agree to therapy;complains of;pain  -UD agree to therapy;complains of;weakness;fatigue;pain  -MF     Patient Effort, Rehab Treatment good  -UD      Symptoms Noted During/After Treatment fatigue;increased pain  -UD       Precautions/Limitations non-weight bearing status  -UD      Equipment Issued to Patient --   knee walker  -UD      Recorded by [UD] Hali Valadez PTA [] Usman Johnson, PT     Vital Signs    O2 Delivery Post Treatment room air  -UD      Pre Patient Position Supine  -UD      Intra Patient Position Standing  -UD      Post Patient Position Supine  -UD      Recorded by [UD] Hali Valadez PTA      Pain Assessment    Pain Assessment 0-10  -UD [place]-Tavares FACES  -MF     [place]-Baker FACES Pain Rating  2  -MF     Pain Score 2  -UD      Post Pain Score 4  -UD      Pain Type Acute pain  -UD      Pain Location Foot  -UD Foot  -MF     Pain Orientation Left  -UD Left  -MF     Pain Intervention(s) Repositioned  -UD Repositioned;Medication (See MAR)  -     Recorded by [UD] Hali Valadez PTA [] Usman Johnson, PT     Cognitive Assessment/Intervention    Orientation Status oriented x 4  -UD      Follows Commands/Answers Questions 100% of the time  -UD      Recorded by [UD] Hali Valadez PTA      Bed Mobility, Assessment/Treatment    Bed Mob, Supine to Sit, [place] conditional independence  -UD      Bed Mob, Sit to Supine, [place] conditional independence  -UD      Recorded by [UD] Hali Valadez PTA      Transfer Assessment/Treatment    Transfers, Sit-Stand [place] stand by assist  -UD      Transfers, Stand-Sit [place] stand by assist  -UD      Transfers, Sit-Stand-Sit, Assist Device --   knee walker  -UD      Recorded by [UD] Hali Valaedz PTA      Gait Assessment/Treatment    Gait, [place] Level contact guard assist;1 person + 1 person to manage equipment  -UD      Gait, Assistive Device --   knee walker  -UD      Gait, Distance (Feet) 100  -UD      Gait, Impairments pain  -UD      Recorded by [UD] Hali Valadez PTA      Therapy Exercises    Right Lower Extremity AROM:;10 reps;supine  -UD      Recorded by [UD] Hali Valadez PTA      Positioning and Restraints    Pre-Treatment Position in bed  -UD  in bed  -MF     Post Treatment Position bed  -UD bed  -MF     In Bed supine;notified nsg;call light within reach;side rails up x2  -UD supine;call light within reach;with nsg  -MF     Recorded by [UD] Hali Valadez, PTA [MF] Usman Johnson, PT       User Key  (r) = Recorded By, (t) = Taken By, (c) = Cosigned By    Initials Name Effective Dates    UD Hali Valadez, PTA 06/22/15 -     MF Usman Johnson, PT 06/19/15 -                 IP PT Goals       02/16/17 0952 02/15/17 1315 02/14/17 1806    Bed Mobility PT LTG    Bed Mobility PT LTG, Date Established   02/14/17  -DM    Bed Mobility PT LTG, Time to Achieve   1 wk  -DM    Bed Mobility PT LTG, Activity Type   all bed mobility  -DM    Bed Mobility PT LTG, [place] Level   independent  -DM    Bed Mobility PT LTG, Outcome goal met  -UD      Transfer Training PT LTG    Transfer Training PT LTG, Date Established   02/14/17  -DM    Transfer Training PT LTG, Time to Achieve   1 wk  -DM    Transfer Training PT LTG, Activity Type   bed to chair /chair to bed;sit to stand/stand to sit  -DM    Transfer Training PT LTG, [place] Level   contact guard assist  -DM    Transfer Training PT LTG, Assist Device   walker, standard   NWB L LE; STABLE VS  -DM    Transfer Training PT LTG, Outcome goal met  -UD      Gait Training PT LTG    Gait Training Goal PT LTG, Date Established   02/14/17  -DM    Gait Training Goal PT LTG, Time to Achieve   1 wk  -DM    Gait Training Goal PT LTG, [place] Level   contact guard assist  -DM    Gait Training Goal PT LTG, Assist Device   walker, standard   NWB L LE, W/ stable VS  -DM    Gait Training Goal PT LTG, Distance to Achieve   25  -DM    Gait Training Goal PT LTG, Outcome goal met  -UD      Wound Care PT LTG    Wound Care PT LTG 1, Date Established  02/15/17  -MC     Wound Care PT LTG 1, Time to Achieve  1 wk  -MC     Wound Care PT LTG 1, Location  L foot  -MC     Wound Care PT LTG 1, Decrease Wound Size  10%  -MC     Wound Care PT  LTG 1, Decrease Necrotic Tissue  25%  -MC     Wound Care PT LTG 1, Increase Granulation To  10%  -MC     Wound Care PT LTG 1, Education  wound care;progression of POC  -MC     Wound Care PT LTG 1, Education Understanding  verbalize understanding  -       User Key  (r) = Recorded By, (t) = Taken By, (c) = Cosigned By    Initials Name Provider Type    HUNTER Valadez PTA Physical Therapy Assistant    MANUEL Crawford, PT Physical Therapist    OLGA De Jesus, PT Physical Therapist          Physical Therapy Education     Title: PT OT SLP Therapies (Active)     Topic: Physical Therapy (Active)     Point: Mobility training (Active)    Learning Progress Summary    Learner Readiness Method Response Comment Documented by Status   Patient Acceptance E,D VU,NR   02/16/17 0952 Done    Magali D,E NR   02/14/17 1806 Active   Significant Other Magali D,E NR   02/14/17 1806 Active               Point: Home exercise program (Active)    Learning Progress Summary    Learner Readiness Method Response Comment Documented by Status   Patient Acceptance E,D VU,NR   02/16/17 0952 Done    Magali D,E NR   02/14/17 1806 Active   Significant Other Magali D,E NR   02/14/17 1806 Active               Point: Body mechanics (Active)    Learning Progress Summary    Learner Readiness Method Response Comment Documented by Status   Patient Acceptance E,D ROODLFO,NR   02/16/17 0952 Done    Magali D,E NR   02/14/17 1806 Active   Significant Other Magali D,E NR   02/14/17 1806 Active               Point: Precautions (Active)    Learning Progress Summary    Learner Readiness Method Response Comment Documented by Status   Patient Acceptance E,D RODOLFO,NR   02/16/17 0952 Done    Magali D,E NR   02/14/17 1806 Active   Significant Other Magali D,E NR   02/14/17 1806 Active                      User Key     Initials Effective Dates Name Provider Type Discipline     06/22/15 -  Hali Valadez PTA Physical Therapy Assistant PT     06/19/15 -   Kelly Crawford, PT Physical Therapist PT                   PT ASSESSMENT (last 72 hours)      PT Evaluation       02/16/17 0900 02/16/17 0815    Rehab Evaluation    Document Type therapy note (daily note)  - therapy note (daily note)  -    Subjective Information agree to therapy;complains of;pain  -UD agree to therapy;complains of;weakness;fatigue;pain  -    Patient Effort, Rehab Treatment good  -UD     Symptoms Noted During/After Treatment fatigue;increased pain  -UD     General Information    Precautions/Limitations non-weight bearing status  -UD     Vital Signs    O2 Delivery Post Treatment room air  -UD     Pre Patient Position Supine  -UD     Intra Patient Position Standing  -UD     Post Patient Position Supine  -UD     Pain Assessment    Pain Assessment 0-10  -UD [place]-Tavares FACES  -    [place]-Baker FACES Pain Rating  2  -MF    Pain Score 2  -UD     Post Pain Score 4  -UD     Pain Type Acute pain  -UD     Pain Location Foot  -UD Foot  -MF    Pain Orientation Left  -UD Left  -    Pain Intervention(s) Repositioned  -UD Repositioned;Medication (See MAR)  -    Cognitive Assessment/Intervention    Orientation Status oriented x 4  -UD     Follows Commands/Answers Questions 100% of the time  -UD     Bed Mobility, Assessment/Treatment    Bed Mob, Supine to Sit, [place] conditional independence  -UD     Bed Mob, Sit to Supine, [place] conditional independence  -UD     Transfer Assessment/Treatment    Transfers, Sit-Stand [place] stand by assist  -UD     Transfers, Stand-Sit [place] stand by assist  -UD     Transfers, Sit-Stand-Sit, Assist Device --   knee walker  -UD     Gait Assessment/Treatment    Gait, [place] Level contact guard assist;1 person + 1 person to manage equipment  -UD     Gait, Assistive Device --   knee walker  -UD     Gait, Distance (Feet) 100  -UD     Gait, Impairments pain  -UD     Therapy Exercises    Right Lower Extremity AROM:;10 reps;supine  -UD     Positioning  and Restraints    Pre-Treatment Position in bed  -UD in bed  -    Post Treatment Position bed  -UD bed  -    In Bed supine;notified nsg;call light within reach;side rails up x2  -UD supine;call light within reach;with nsg  -      02/15/17 1315 02/14/17 1610    Rehab Evaluation    Document Type evaluation  - evaluation  -    Subjective Information no complaints;agree to therapy  - agree to therapy;complains of;weakness;pain;numbness;swelling   per nsg,non-compl w/NWB L (amb toBRad anjelica x 2 w/FWB LLE a.m.  -DM    Patient Effort, Rehab Treatment  good  -DM    Symptoms Noted During/After Treatment  increased pain;fatigue  -DM    General Information    Patient Profile Review yes  - yes  -DM    Onset of Illness/Injury or Date of Surgery Date 02/13/17  - 02/13/17  -DM    Referring Physician MD Michelle  - CHINTAN Osullivan  -DM    General Observations  Sup, in bed;tele; RA;IV;kerlix dsg over Lfoot incis.(transmet. amp.MT'S 3--5  -DM    Pertinent History Of Current Problem See PT mobility note for details. Pt wants to try approximately one month of aggressive wound care prior to considering an amputation.  - HAD transmet amp. Lfoot (MT's 3--5)on 1-30-17 d/t L foot ulcer/infect.;transf. to [facility], onIV ABX;told by MD that hyperbaric O2 Rx. would not accomplish healing;transf back to [facility] for sx.consult re: ? higher level amp.L LE d/t tunneling of wd;dx:cellulitis L resid.foot, hypomag.  -DM    Precautions/Limitations fall precautions;non-weight bearing status;other (see comments)   wound vac. NWB LLE  - fall precautions;non-weight bearing status;other (see comments);insensate limb   non-compliant w/NWB L LE (transmet.amp.L foot);neuropathy  -DM    Prior Level of Function  min assist:;gait;transfer;ADL's   [facility]  -DM    Equipment Currently Used at Home  cane, straight;commode  -DM    Plans/Goals Discussed With patient;spouse/S.O.;agreed upon  -MC  patient;spouse/S.O.;agreed upon  -DM    Risks Reviewed patient:;spouse/S.O.:;increased discomfort  -MC patient:;spouse/S.O.:;LOB;increased discomfort;change in vital signs;increased drainage;lines disloged  -DM    Benefits Reviewed patient:;spouse/S.O.:;improve skin integrity  -MC patient:;spouse/S.O.:;improve function;increase independence;increase strength;increase balance;decrease pain;increase knowledge  -DM    Barriers to Rehab medically complex;previous functional deficit  -MC medically complex;previous functional deficit  -DM    Living Environment    Lives With  spouse  -DM    Living Arrangements  apartment  -DM    Home Accessibility  no concerns  -DM    Stair Railings at Home  none  -DM    Type of Financial/Environmental Concern  none  -DM    Transportation Available  car;family or friend will provide  -DM    Clinical Impression    Date of Referral to PT 02/14/17  -MC 02/13/17  -DM    PT Diagnosis impaired skin integrity  -MC impaired funct mobil.  -DM    Patient/Family Goals Statement To try to heal the wound.  -     Criteria for Skilled Therapeutic Interventions Met yes;treatment indicated  -MC yes;treatment indicated  -DM    Rehab Potential fair, will monitor progress closely  -MC good, to achieve stated therapy goals  -DM    Vital Signs    Pre Systolic BP Rehab  169  -DM    Pre Treatment Diastolic BP  97  -DM    Intra Systolic BP Rehab  179  -DM    Intra Treatment Diastolic BP  94  -DM    Post Systolic BP Rehab  172  -DM    Post Treatment Diastolic BP  98  -DM    Pretreatment Heart Rate (beats/min)  98  -DM    Intratreatment Heart Rate (beats/min)  112  -DM    Posttreatment Heart Rate (beats/min)  99  -DM    Pre SpO2 (%)  96  -DM    O2 Delivery Pre Treatment  room air  -DM    Post SpO2 (%)  96  -DM    O2 Delivery Post Treatment  room air  -DM    Pre Patient Position  Supine  -DM    Intra Patient Position  Standing  -DM    Post Patient Position  Sitting  -DM    Pain Assessment    Pain Assessment  No/denies pain  -MC No/denies pain   insensate L foot  -DM    Cognitive Assessment/Intervention    Current Cognitive/Communication Assessment functional  -MC functional  -DM    Orientation Status oriented x 4  -MC oriented x 4  -DM    Follows Commands/Answers Questions 100% of the time;able to follow single-step instructions  -% of the time;75% of the time;able to follow single-step instructions;needs cueing;needs increased time   OCC. placing wt L LE despite cues to avoid  -DM    Personal Safety  mild impairment;decreased awareness, need for assist;decreased awareness, need for safety;impulsive   non-compliant w/NWB L LE  -DM    Personal Safety Interventions  fall prevention program maintained;gait belt;nonskid shoes/slippers when out of bed  -DM    Short/Long Term Memory  intact short term memory;intact long term memory  -DM    ROM (Range of Motion)    General ROM  lower extremity range of motion deficits identified   L ankle limited 25 % d/t neurop/surgical dsg  -DM    MMT (Manual Muscle Testing)    General MMT Assessment  lower extremity strength deficits identified;upper extremity strength deficits identified   B shldr's grossly 4 to 4+/5;L LE grossly 4/5 (ankle 2/5)   -DM    Mobility Assessment/Training    Extremity Weight-Bearing Status  left lower extremity  -DM    Left Lower Extremity Weight-Bearing  non weight-bearing  -DM    Bed Mobility, Assessment/Treatment    Bed Mobility, Assistive Device  head of bed elevated;bed rails  -DM    Bed Mobility, Roll Left, [place]  conditional independence  -DM    Bed Mobility, Roll Right, [place]  conditional independence  -DM    Bed Mobility, Scoot/Bridge, [place]  verbal cues required;independent  -DM    Bed Mob, Supine to Sit, [place]  conditional independence   cues to keep L foot NWB once EOB  -DM    Bed Mobility, Safety Issues  decreased use of arms for pushing/pulling;decreased use of legs for bridging/pushing  -DM    Bed Mobility,  Impairments  ROM decreased;strength decreased  -DM    Bed Mobility, Comment  issued loaner std wx;brought knee wx for trial;  -DM    Transfer Assessment/Treatment    Transfers, Sit-Stand [place]  minimum assist (75% patient effort);verbal cues required;2 person assist required   MIN.1 asst w/std wx;min 2 asst w/knee wx(P.T.,P.T.student)  -DM    Transfers, Stand-Sit [place]  verbal cues required;minimum assist (75% patient effort);2 person assist required   diffic.shifting LLEoff knee wx.,reaching to low recliner(NWB  -DM    Transfers, Sit-Stand-Sit, Assist Device  standard walker   2 trials std wx;3rd trial rolling knee wx  -DM    Transfer, Maintain Weight Bearing Status  other (see comments)   maintained NWB 2 of 3 trials  -DM    Transfer, Safety Issues  balance decreased during turns;weight-shifting ability decreased  -DM    Transfer, Impairments  strength decreased;impaired balance   5 min sit rest btwn @ of 3 trials;BP taken w/@ (elev.)  -DM    Transfer, Comment  'prefers std wx';knee wx ret. to dept.  -DM    Gait Assessment/Treatment    Gait, [place] Level  contact guard assist;2 person assist required;verbal cues required   init CGA of2,then of 1(side step Std wx.6 ft;rolling knee wx  -DM    Gait, Assistive Device  standard walker;other (see comments)   2 trials std wx (6 ft);3rd trial rolling Lknee wx (to melendrez)  -DM    Gait, Distance (Feet)  46   6 + 40  -DM    Gait, Gait Pattern Analysis  swing-to gait  -DM    Gait, Gait Deviations  gayla decreased;step length decreased;weight-shifting ability decreased  -DM    Gait, Maintain Weight Bearing Status  other (see comments)   maintained NWB L LE on  2 of 3 trials  -DM    Gait, Safety Issues  step length decreased;weight-shifting ability decreased  -DM    Gait, Impairments  ROM decreased;sensation decreased;strength decreased;impaired balance  -DM    Gait, Comment  req. loaner std wx be left at BS  -DM    Therapy Exercises    Bilateral Lower  Extremities  AROM:;10 reps;sitting;ankle pumps/circles;glut sets;hip flexion;LAQ   L foot resting on pillow during RLE exer  -DM    Bilateral Upper Extremity  AROM:;10 reps;sitting;elbow flexion/extension;shoulder abduction/adduction;shoulder extension/flexion;shoulder horizontal abd/add;shoulder protraction/retraction;shoulder rolls/shrugs   ALSO RROM X 10 REPS SHLDRS,THEN ELBOWS  -    Positioning and Restraints    Pre-Treatment Position in bed  - in bed  -    Post Treatment Position bed  - chair  -    In Bed supine;call light within reach;encouraged to call for assist;with family/caregiver  -     In Chair  sitting;call light within reach;with family/caregiver;with other staff;legs elevated  -DM      02/14/17 1212 02/14/17 0800    General Information    Equipment Currently Used at Home cane, straight;commode  -[facility]     Living Environment    Lives With spouse  -[facility]     Living Arrangements apartment  -[facility] [place]     Transportation Available family or friend will provide  -[facility] [place]     Muscle Tone Assessment    LLE Muscle Tone Assessment  mildly decreased tone  -AM      02/14/17 0049 02/13/17 2000    General Information    Equipment Currently Used at Home cane, straight  -EK     Living Environment    Lives With spouse  -EK     Living Arrangements apartment  -EK     Home Accessibility no concerns  -EK     Stair Railings at Home none  -EK     Type of Financial/Environmental Concern none  -EK     Transportation Available car;family or friend will provide  -EK     Muscle Tone Assessment    LLE Muscle Tone Assessment  mildly decreased tone  -EK      User Key  (r) = Recorded By, (t) = Taken By, (c) = Cosigned By    Initials Name Provider Type    HUNTER Valadez, PTA Physical Therapy Assistant    KARLOS Johnson, PT Physical Therapist    MANUEL Crawford, PT Physical Therapist    [facility] [place] Princess Odonnell      Marlin De Jesus, PT Physical Therapist    EK Shayy Worthy, RN Registered Nurse    SHEILA PRIEST  Bret RN Registered Nurse            PT Recommendation and Plan  Anticipated Equipment Needs At Discharge: gait belt, walker, rolling knee walker (std wx vs. knee wx)  Anticipated Discharge Disposition: home with home health (per pt/spouse, they want to go home)  PT Frequency: daily    Plan Of Care Reviewed With: patient       Outcome Summary/Follow up Plan: wound vac with small seal leak, but able to seal without difficulty.  Will cont with wound vac and change in 1-2 days.           Outcome Measures       02/16/17 0900 02/14/17 1610       How much help from another person do you currently need...    Turning from your back to your side while in flat bed without using bedrails? 4  -UD 4  -DM     Moving from lying on back to sitting on the side of a flat bed without bedrails? 4  -UD 4  -DM     Moving to and from a bed to a chair (including a wheelchair)? 3  -UD 3  -DM     Standing up from a chair using your arms (e.g., wheelchair, bedside chair)? 3  -UD 3  -DM     Climbing 3-5 steps with a railing? 1  -UD 1  -DM     To walk in hospital room? 3  -UD 3  -DM     AM-PAC 6 Clicks Score 18  -UD 18  -DM     Functional Assessment    Outcome Measure Options  AM-PAC 6 Clicks Basic Mobility (PT)  -DM       User Key  (r) = Recorded By, (t) = Taken By, (c) = Cosigned By    Initials Name Provider Type    HUNTER Valadez PTA Physical Therapy Assistant    MANUEL Crawford, PT Physical Therapist              Time Calculation        PT Charges       02/16/17 0955 02/16/17 0815       Time Calculation    Start Time  0815  -     PT Received On 02/16/17  -UD      PT Goal Re-Cert Due Date 02/24/17  -UD 02/24/17  -     Time Calculation- PT    Total Timed Code Minutes- PT 25 minute(s)  -UD 20 minute(s)  -       User Key  (r) = Recorded By, (t) = Taken By, (c) = Cosigned By    Initials Name Provider Type    HUNTER Valadez PTA Physical Therapy Assistant     Usman Johnson, PT Physical Therapist             Therapy Charges  for Today     Code Description Service Date Service Provider Modifiers Qty    74586663804 HC PT NEG PRESS WOUND OVER 50SQCM DME1 2/16/2017 Usman Johnson, PT  1    [id number] HC PT THER SUPP EA 15 MIN 2/16/2017 Usman Johnson, PT GP 1            PT G-Codes  Outcome Measure Options: AM-PAC 6 Clicks Basic Mobility (PT)        Usman Johnson, PT  2/16/2017

## 2017-02-16 NOTE — PROGRESS NOTES
[facility] Medicine Services  INPATIENT PROGRESS NOTE    Date of Admission: 2/13/2017  Length of Stay: 3  Primary Care Physician: Juan Rios MD    Subjective     Chief Complaint: foot ulceration  HPI:  Had small wound vac leak this morning, now better.  HAs no complaints.  Plan is home at discharge, and wife agrees.      Review Of Systems:   Review of Systems   Constitutional: Negative for fever.   Respiratory: Negative for shortness of breath.    Cardiovascular: Negative for chest pain.   Gastrointestinal: Negative for abdominal pain.   All other systems reviewed and are negative.      Objective      Temp:  [98 °F (36.7 °C)-98.2 °F (36.8 °C)] 98 °F (36.7 °C)  Heart Rate:  [104] 104  Resp:  [16-18] 18  BP: (156-162)/(92-97) 156/92  Physical Exam    Alert, oriented, up in chair  PICC RUE  obese  RRR, no murmur rub or gallop  CTAB  Abdomen soft, non tender, non distended, normal bowel sounds  Left foot with wound vac and bandaged, not examined.  + 1-2+ edema to left leg below knee  Normal affect  Moves all extremities    Results Review:    I have reviewed the labs, radiology results and diagnostic studies.      Results from last 7 days  Lab Units 02/16/17  0557   WBC 10*3/mm3 10.43   HEMOGLOBIN g/dL 10.4*   PLATELETS 10*3/mm3 229       Results from last 7 days  Lab Units 02/16/17  0557   SODIUM mmol/L 136   POTASSIUM mmol/L 4.0   TOTAL CO2 mmol/L 29.0   CREATININE mg/dL 0.50*   GLUCOSE mg/dL 177*       Culture Data:   BLOOD CULTURE   Date Value Ref Range Status   02/13/2017 No growth at less than 24 hours  Preliminary   02/13/2017 No growth at less than 24 hours  Preliminary     Radiology Data:     I have reviewed the medications.    aspirin 81 mg Oral Daily   ceftriaxone 2 g Intravenous Daily   DAPTOmycin (CUBICIN)   mg Intravenous Q24H   docusate sodium 100 mg Oral BID   famotidine 20 mg Oral BID   gabapentin 1,200 mg Oral Q8H   heparin (porcine) 5,000 Units Subcutaneous Q12H    insulin detemir 30 Units Subcutaneous Q12H   insulin lispro 10 Units Subcutaneous TID With Meals   insulin lispro 2-7 Units Subcutaneous 4x Daily AC & at Bedtime   losartan 100 mg Oral Q24H   polyethylene glycol 17 g Oral Daily   probiotic 1 capsule Oral Daily       Assessment/Plan     Assessment/Problem List  Principal Problem:    Diabetic foot ulcer associated with diabetes mellitus due to underlying condition  Active Problems:    Essential hypertension    Hypertriglyceridemia, essential    Poorly controlled type 2 diabetes mellitus with peripheral neuropathy    Type 2 diabetes mellitus with foot ulcer, with long-term current use of insulin    DUNLAP (nonalcoholic steatohepatitis)    Hyponatremia    Hypomagnesemia    Diabetic foot ulcer    Plan    Diabetic foot ulcer  - wound vac and IV abx, picc placed today,  - patient will fu with both Ct surgery Michelle and NO Dong to assess clinical progress - if poor wound healing may eventually need AKA  - s/p transmetatarsal amputation 1/2016    DMII  - poor home control, discussed need for tighter glucose regulation at eventual discharge  - good control currently  - appears he follows outpatient with Dr Mcdonald - will need close followup    DUNLAP with stage IV hepatic fibrosis    Dyslipidemia    HTN    Peripheral Neuropathy     Dispo - tried to reach CM, but no answer.  LEft order to arrange HH, wound care, and IV abx for home.  If all arranged, anticipate home tomorrow.  Patient and family in agreement.    DVT prophylaxis: heparin  Discharge Planning: I expect patient to be discharged to home in 1-2 days.    Shahbaz Valdivia MD   02/16/17   1:42 PM    Please note that portions of this note may have been completed with a voice recognition program. Efforts were made to edit the dictations, but occasionally words are mistranscribed.

## 2017-02-16 NOTE — PLAN OF CARE
Problem: Patient Care Overview (Adult)  Goal: Plan of Care Review  Outcome: Ongoing (interventions implemented as appropriate)    02/16/17 0621   Coping/Psychosocial Response Interventions   Plan Of Care Reviewed With patient   Patient Care Overview   Progress progress toward functional goals as expected       Goal: Adult Individualization and Mutuality  Outcome: Ongoing (interventions implemented as appropriate)    02/16/17 0621   Individualization   Patient Specific Goals Patient will sit in the chair for all meals today.         Problem: Skin Integrity Impairment, Risk/Actual (Adult)  Goal: Skin Integrity/Wound Healing  Outcome: Ongoing (interventions implemented as appropriate)    02/16/17 0621   Skin Integrity Impairment, Risk/Actual (Adult)   Skin Integrity/Wound Healing making progress toward outcome

## 2017-02-17 VITALS
BODY MASS INDEX: 33.8 KG/M2 | HEIGHT: 75 IN | HEART RATE: 103 BPM | WEIGHT: 271.8 LBS | RESPIRATION RATE: 18 BRPM | OXYGEN SATURATION: 95 % | DIASTOLIC BLOOD PRESSURE: 96 MMHG | TEMPERATURE: 99.7 F | SYSTOLIC BLOOD PRESSURE: 166 MMHG

## 2017-02-17 LAB
GLUCOSE BLDC GLUCOMTR-MCNC: 100 MG/DL (ref 70–130)
GLUCOSE BLDC GLUCOMTR-MCNC: 111 MG/DL (ref 70–130)
GLUCOSE BLDC GLUCOMTR-MCNC: 84 MG/DL (ref 70–130)

## 2017-02-17 PROCEDURE — 25010000002 HEPARIN (PORCINE) PER 1000 UNITS: Performed by: INTERNAL MEDICINE

## 2017-02-17 PROCEDURE — 97606 NEG PRS WND THER DME>50 SQCM: CPT

## 2017-02-17 PROCEDURE — 25010000003 CEFTRIAXONE PER 250 MG: Performed by: INTERNAL MEDICINE

## 2017-02-17 PROCEDURE — 97110 THERAPEUTIC EXERCISES: CPT

## 2017-02-17 PROCEDURE — 63710000001 INSULIN DETEMIR PER 5 UNITS: Performed by: NURSE PRACTITIONER

## 2017-02-17 PROCEDURE — 99239 HOSP IP/OBS DSCHRG MGMT >30: CPT | Performed by: NURSE PRACTITIONER

## 2017-02-17 PROCEDURE — 25010000002 DAPTOMYCIN PER 1 MG: Performed by: INTERNAL MEDICINE

## 2017-02-17 PROCEDURE — 82962 GLUCOSE BLOOD TEST: CPT

## 2017-02-17 RX ORDER — HYDROCODONE BITARTRATE AND ACETAMINOPHEN 10; 325 MG/1; MG/1
1 TABLET ORAL 3 TIMES DAILY PRN
Qty: 9 TABLET | Refills: 0 | Status: SHIPPED | OUTPATIENT
Start: 2017-02-17 | End: 2017-02-24

## 2017-02-17 RX ORDER — LOSARTAN POTASSIUM 100 MG/1
100 TABLET ORAL
Qty: 30 TABLET | Refills: 0 | Status: ON HOLD | OUTPATIENT
Start: 2017-02-17 | End: 2018-08-08 | Stop reason: ALTCHOICE

## 2017-02-17 RX ORDER — CEFTRIAXONE SODIUM 2 G/50ML
2 INJECTION, SOLUTION INTRAVENOUS DAILY
Start: 2017-02-17 | End: 2017-03-07 | Stop reason: HOSPADM

## 2017-02-17 RX ORDER — POLYETHYLENE GLYCOL 3350 17 G/17G
17 POWDER, FOR SOLUTION ORAL DAILY
Start: 2017-02-17 | End: 2017-03-07 | Stop reason: HOSPADM

## 2017-02-17 RX ORDER — PSEUDOEPHEDRINE HCL 30 MG
100 TABLET ORAL 2 TIMES DAILY
Start: 2017-02-17 | End: 2017-03-07 | Stop reason: HOSPADM

## 2017-02-17 RX ADMIN — Medication 1 CAPSULE: at 08:17

## 2017-02-17 RX ADMIN — LOSARTAN POTASSIUM 100 MG: 50 TABLET, FILM COATED ORAL at 08:18

## 2017-02-17 RX ADMIN — GABAPENTIN 1200 MG: 300 CAPSULE ORAL at 14:07

## 2017-02-17 RX ADMIN — DAPTOMYCIN 750 MG: 500 INJECTION, POWDER, LYOPHILIZED, FOR SOLUTION INTRAVENOUS at 11:53

## 2017-02-17 RX ADMIN — CEFTRIAXONE SODIUM 2 G: 2 INJECTION, SOLUTION INTRAVENOUS at 08:21

## 2017-02-17 RX ADMIN — INSULIN LISPRO 10 UNITS: 100 INJECTION, SOLUTION INTRAVENOUS; SUBCUTANEOUS at 08:24

## 2017-02-17 RX ADMIN — GABAPENTIN 1200 MG: 300 CAPSULE ORAL at 05:39

## 2017-02-17 RX ADMIN — FAMOTIDINE 20 MG: 20 TABLET ORAL at 08:17

## 2017-02-17 RX ADMIN — HEPARIN SODIUM 5000 UNITS: 5000 INJECTION, SOLUTION INTRAVENOUS; SUBCUTANEOUS at 08:22

## 2017-02-17 RX ADMIN — ASPIRIN 81 MG: 81 TABLET, COATED ORAL at 08:17

## 2017-02-17 RX ADMIN — INSULIN DETEMIR 30 UNITS: 100 INJECTION, SOLUTION SUBCUTANEOUS at 08:28

## 2017-02-17 NOTE — PROGRESS NOTES
Adult Nutrition  Assessment/PES    Patient Name:  Kendrick Crystal  YOB: 1970  MRN: [id number]  Admit Date:  2/13/2017    Assessment Date:  2/17/2017        Reason for Assessment       02/17/17 0739    Reason for Assessment    Reason For Assessment/Visit follow up protocol    Time Spent (min) 5                                Problem/Interventions:        Problem 1       02/17/17 0740    Nutrition Diagnoses Problem 1    Problem 1 Inadequate Nutrient Intake    Etiology (related to) MNT for Treatment/Condition   PROTEIN NEEDS FOR FOOD WOUND HEALING IN OBESE PT WITH DUNLAP    Signs/Symptoms (evidenced by) Other (comment)   CURRENT PO DIET NOT HIGH ENOUGH IN PROTEIN WITHOUT SUPPLEMENTATION    Resolved? Yes            Problem 2       02/17/17 0740    Nutrition Diagnoses Problem 2    Problem 2 Nutrition Appropriate for Condition at this Time    Signs/Symptoms (evidenced by) PO Intake                        Education/Evaluation       02/17/17 0740    Monitor/Evaluation    Monitor Per protocol        Comments:      Electronically signed by:  Erinn Melgar RD  02/17/17 7:40 AM

## 2017-02-17 NOTE — PLAN OF CARE
Problem: Patient Care Overview (Adult)  Goal: Plan of Care Review  Outcome: Ongoing (interventions implemented as appropriate)    02/17/17 0527   Coping/Psychosocial Response Interventions   Plan Of Care Reviewed With patient   Patient Care Overview   Progress improving       Goal: Adult Individualization and Mutuality  Outcome: Ongoing (interventions implemented as appropriate)    02/16/17 0621   Individualization   Patient Specific Goals Patient will sit in the chair for all meals today.         Problem: Skin Integrity Impairment, Risk/Actual (Adult)  Goal: Skin Integrity/Wound Healing  Outcome: Ongoing (interventions implemented as appropriate)    02/17/17 0527   Skin Integrity Impairment, Risk/Actual (Adult)   Skin Integrity/Wound Healing making progress toward outcome

## 2017-02-17 NOTE — PLAN OF CARE
Problem: Patient Care Overview (Adult)  Goal: Plan of Care Review  Outcome: Ongoing (interventions implemented as appropriate)    02/17/17 1545   Coping/Psychosocial Response Interventions   Plan Of Care Reviewed With patient   Outcome Evaluation   Outcome Summary/Follow up Plan Pt cont to have moderate slough and adipose in wound base, but will benefit from cont use of wound vac at d/c to help increase granulation and improve healing potential.          Problem: Inpatient Physical Therapy  Goal: Wound Care Goal 1 LTG- PT  Outcome: Unable to achieve outcome(s) by discharge Date Met:  02/17/17    02/15/17 1315 02/17/17 1545   Wound Care PT LTG   Wound Care PT LTG 1, Date Established 02/15/17 --    Wound Care PT LTG 1, Time to Achieve 1 wk --    Wound Care PT LTG 1, Location L foot --    Wound Care PT LTG 1, Decrease Wound Size 10% --    Wound Care PT LTG 1, Decrease Necrotic Tissue 25% --    Wound Care PT LTG 1, Increase Granulation To 10% --    Wound Care PT LTG 1, Education wound care;progression of POC --    Wound Care PT LTG 1, Education Understanding verbalize understanding --    Wound Care PT LTG 1, Outcome --  goal partially met

## 2017-02-17 NOTE — PROGRESS NOTES
Cardiothoracic Surgery Progress Note      1/30/17   Left 5/4/3 transmetatarsal amputation        LOS: 4 days      Subjective:  Wound vac re-sealed yesterday. Doing well this am.     Objective:  Vital Signs  Temp:  [98.7 °F (37.1 °C)-99 °F (37.2 °C)] 98.8 °F (37.1 °C)  Heart Rate:  [103] 103  Resp:  [18] 18  BP: (154-167)/(93-96) 166/96    Physical Exam:   General Appearance: alert, appears stated age and cooperative   Lungs: clear to auscultation, respirations regular, respirations even and respirations unlabored   Heart: regular rhythm & normal rate, normal S1, S2 and no murmur, no clau, no rub   Skin: Left foot wound with no surrounding erythema. Wound vac in place.      Results:    Results from last 7 days  Lab Units 02/16/17  0557   WBC 10*3/mm3 10.43   HEMOGLOBIN g/dL 10.4*   HEMATOCRIT % 32.0*   PLATELETS 10*3/mm3 229       Results from last 7 days  Lab Units 02/16/17  0557   SODIUM mmol/L 136   POTASSIUM mmol/L 4.0   CHLORIDE mmol/L 100   TOTAL CO2 mmol/L 29.0   BUN mg/dL 16   CREATININE mg/dL 0.50*   GLUCOSE mg/dL 177*   CALCIUM mg/dL 9.3         Assessment:  Left lower extremity wound status post 5/4/3 transmetatarsal amputation.  Marginal healing.      Plan:  Continue wound vac  Continue ABX as per ID. PICC in place.   Hold on Left AKA  Discharge home if home health set up and all agree

## 2017-02-17 NOTE — DISCHARGE SUMMARY
[facility] Medicine Services  DISCHARGE SUMMARY       Date of Admission: 2/13/2017  Date of Discharge:  2/17/2017  Primary Care Physician: Juan Rios MD    Discharge Diagnoses:  Active Hospital Problems (** Indicates Principal Problem)    Diagnosis Date Noted   • **Diabetic foot ulcer associated with diabetes mellitus due to underlying condition [E08.621, L97.509] 01/18/2017   • Hypomagnesemia [E83.42] 02/13/2017   • DUNLAP (nonalcoholic steatohepatitis) [K75.81] 01/25/2017   • Hyponatremia [E87.1] 01/25/2017   • Poorly controlled type 2 diabetes mellitus with peripheral neuropathy [E11.42, E11.65] 01/25/2017   • Hypertriglyceridemia, essential [E78.1] 01/18/2017   • Essential hypertension [I10] 01/17/2017      Resolved Hospital Problems    Diagnosis Date Noted Date Resolved   No resolved problems to display.       Presenting Problem/History of Present Illness  Diabetic foot ulcer [E11.621, L97.509]     Chief Complaint on Day of Discharge:   Hospital follow up    History of Present Illness on Day of Discharge:   Denies pain  Tolerating antibiotics  Denies chest pain/SOB    Hospital Course  Patient is a 46 y.o. Yr old male with history of history uncontrolled diabetes with extensive comorbidity as previously outlined. He has lower extremity vascular disease but no specific option for revascularization per discussion with Dr. Martinez. He was admitted January 2017 with left foot infection, MRI not confirming osteomyelitis, but had surgery on January 30 with metatarsals 3/4/5 amputated, marginal perfusion per operative note and MRSA/GB strep/coag-negative staph in his various cultures. He was transferred to [facility] on broad-spectrum IV antibiotics. While at [facility], he was under the care of Dr. Herrera and, per family/patient, he was told that he required hyperbaric oxygen therapy and had very little chance for long-term healing at the foot. He apparently was readmitted on February 13  to [facility] with patient interest in having higher level amputation.  Wound vac was placed and he will continue antibiotics for longer to re-eval wound improvement.  There is still a possibility of amputation need at a later date.  He is felt to be stable and ready for dc home with home health/wound vac/IV antibiotics.     Procedures Performed  None    Consults:   Consults     Date and Time Order Name Status Description    2/15/2017 1732 Inpatient Consult to Cardiothoracic Surgery In process     2/13/2017 2111 Inpatient Consult to Infectious Diseases Completed     1/26/2017 1259 Inpatient Consult to Vascular Surgery Completed     1/25/2017 1717 Inpatient Consult to Infectious Diseases Completed           Pertinent Test Results:  Component      Latest Ref Rng 2/16/2017 2/16/2017 2/17/2017 2/17/2017           4:32 PM  8:21 PM  8:04 AM 11:59 AM   Glucose      70 - 130 mg/dL 94 112 111 84     Component      Latest Ref Rng 2/13/2017 2/16/2017   WBC      3.50 - 10.80 10*3/mm3 11.15 (H) 10.43   RBC      4.20 - 5.76 10*6/mm3 3.87 (L) 3.70 (L)   Hemoglobin      13.1 - 17.5 g/dL 10.9 (L) 10.4 (L)   Hematocrit      38.9 - 50.9 % 33.5 (L) 32.0 (L)   MCV      80.0 - 99.0 fL 86.6 86.5   MCH      27.0 - 31.0 pg 28.2 28.1   MCHC      32.0 - 36.0 g/dL 32.5 32.5   RDW      11.3 - 14.5 % 14.0 14.0   RDW-SD      37.0 - 54.0 fl 43.8 44.1   MPV      6.0 - 12.0 fL 10.0 10.1   Platelets      150 - 450 10*3/mm3 236 229   Neutrophil %      41.0 - 71.0 % 67.8    Lymphocyte %      24.0 - 44.0 % 15.1 (L)    Monocyte %      0.0 - 12.0 % 7.7    Eosinophil %      0.0 - 3.0 % 8.8 (H)    Basophil %      0.0 - 1.0 % 0.4    Immature Grans %      0.0 - 0.6 % 0.2    Neutrophils, Absolute      1.50 - 8.30 10*3/mm3 7.57    Lymphocytes, Absolute      0.60 - 4.80 10*3/mm3 1.68    Monocytes, Absolute      0.00 - 1.00 10*3/mm3 0.86    Eosinophils, Absolute      0.10 - 0.30 10*3/mm3 0.98 (H)    Basophils, Absolute      0.00 - 0.20 10*3/mm3 0.04   "  Immature Grans, Absolute      0.00 - 0.03 10*3/mm3 0.02    Glucose      70 - 100 mg/dL 134 (H) 177 (H)   BUN      9 - 23 mg/dL 14 16   Creatinine      0.60 - 1.30 mg/dL 0.40 (L) 0.50 (L)   Sodium      132 - 146 mmol/L 136 136   Potassium      3.5 - 5.5 mmol/L 4.0 4.0   Chloride      99 - 109 mmol/L 101 100   CO2      20.0 - 31.0 mmol/L 28.0 29.0   Calcium      8.7 - 10.4 mg/dL 9.5 9.3   Total Protein      5.7 - 8.2 g/dL 6.8 6.8   Albumin      3.20 - 4.80 g/dL 3.60 3.50   ALT (SGPT)      7 - 40 U/L 23 21   AST (SGOT)      0 - 33 U/L 26 22   Alkaline Phosphatase      25 - 100 U/L 173 (H) 163 (H)   Total Bilirubin      0.3 - 1.2 mg/dL 0.4 0.4   eGFR Non African Amer      >60 mL/min/1.73 >150 >150   Globulin      gm/dL 3.2 3.3   A/G Ratio      1.5 - 2.5 g/dL 1.1 (L) 1.1 (L)   BUN/Creatinine Ratio      7.0 - 25.0 35.0 (H) 32.0 (H)   Anion Gap      3.0 - 11.0 mmol/L 7.0 7.0   Creatine Kinase      26 - 174 U/L  80     Condition on Discharge:  stable    Physical Exam on Discharge:  Visit Vitals   • /96 (BP Location: Left arm, Patient Position: Lying)   • Pulse 103   • Temp 98.8 °F (37.1 °C) (Oral)   • Resp 18   • Ht 75\" (190.5 cm)   • Wt 271 lb 12.8 oz (123 kg)   • SpO2 95%   • BMI 33.97 kg/m2     Physical Exam   Constitutional: He is oriented to person, place, and time. He appears well-developed and well-nourished. No distress.   Wife at bedside   HENT:   Head: Normocephalic.   Eyes: No scleral icterus.   Neck: Neck supple.   Cardiovascular: Normal rate and regular rhythm.  Exam reveals no gallop and no friction rub.    No murmur heard.  Pulmonary/Chest: Effort normal and breath sounds normal. No respiratory distress. He has no wheezes.   Abdominal: Soft. Bowel sounds are normal. He exhibits no distension. There is no tenderness.   Musculoskeletal: He exhibits no edema.   Neurological: He is oriented to person, place, and time.   Drowsy, but arousable   Skin: Skin is warm and dry.   LLE wound vac in place  Dressing " c/d/i   Vitals reviewed.        Discharge Disposition  Home or Self Care    Discharge Medications   Kendrick Crystal   Home Medication Instructions JANET:[id number]    Printed on:02/17/17 0889   Medication Information                      aspirin 81 MG EC tablet  Take 1 tablet by mouth Daily.             atorvastatin (LIPITOR) 40 MG tablet  Take 1 tablet by mouth Every Night.             cefTRIAXone (ROCEPHIN) 40 MG/ML IVPB  Infuse 50 mL into a venous catheter Daily. Indications: Bone and Joint Infection             DAPTOmycin 750 mg in sodium chloride 0.9 % 50 mL  Infuse 750 mg into a venous catheter Daily. Indications: Bone and Joint Infection             docusate sodium 100 MG capsule  Take 100 mg by mouth 2 (Two) Times a Day.             famotidine (PEPCID) 20 MG tablet  Take 1 tablet by mouth 2 (Two) Times a Day.             gabapentin (NEURONTIN) 400 MG capsule  Take 3 capsules by mouth 3 (Three) Times a Day.             HYDROcodone-acetaminophen (NORCO)  MG per tablet  Take 1 tablet by mouth 3 (Three) Times a Day As Needed for moderate pain (4-6) for up to 7 days.             insulin detemir (LEVEMIR) 100 UNIT/ML injection  Inject 30 Units under the skin Every 12 (Twelve) Hours.             insulin lispro (humaLOG) 100 UNIT/ML injection  Inject 10 Units under the skin 3 (Three) Times a Day With Meals.             insulin lispro (humaLOG) 100 UNIT/ML injection  Inject 2-7 Units under the skin 4 (Four) Times a Day With Meals & at Bedtime.             losartan (COZAAR) 100 MG tablet  Take 1 tablet by mouth Daily.             melatonin 5 MG sublingual tablet sublingual tablet  Place 1 tablet under the tongue At Night As Needed (insomnia).             polyethylene glycol (MIRALAX) packet  Take 17 g by mouth Daily.             povidone-iodine (BETADINE) 10 % ointment  Apply  topically Daily. To bilateral feet ulcerations             probiotic (CULTURELLE) capsule capsule  Take 1 capsule by mouth Daily.                  Discharge Diet:   Diet Instructions     Diet: Regular, Consistent Carbohydrate; Thin Liquids, No Restrictions       Discharge Diet:   Regular  Consistent Carbohydrate      Fluid Consistency:  Thin Liquids, No Restrictions                 Discharge Care Plan / Instructions:    Activity at Discharge:   Activity Instructions     Activity as Tolerated                     Follow-up Appointments  Future Appointments  Date Time Provider Department Center   2/21/2017 10:00 AM LAUREN Birch [facility] None   3/16/2017 12:30 PM Callie MCDONALD MD [facility] None         Test Results Pending at Discharge   Order Current Status    Blood Culture Preliminary result    Blood Culture Preliminary result           CHINTAN Hamilton 02/17/17 2:05 PM    Time: Discharge 35 min    Please note that portions of this note may have been completed with a voice recognition program. Efforts were made to edit the dictations, but occasionally words are mistranscribed.

## 2017-02-17 NOTE — THERAPY DISCHARGE NOTE
Acute Care - Wound/Debridement Treatment Note/Discharge  [facility]     Patient Name: Kendrick Crystal  : 1970  MRN: [id number]  Today's Date: 2017  Onset of Illness/Injury or Date of Surgery Date: 17   Date of Referral to PT: 17   Referring Physician: MD Michelle       Admit Date: 2017    Visit Dx:    ICD-10-CM ICD-9-CM   1. Impaired functional mobility, balance, gait, and endurance Z74.09 V49.89       Patient Active Problem List   Diagnosis   • Cirrhosis   • Essential hypertension   • Non-compliance   • Type 2 diabetes mellitus with hyperosmolarity without nonketotic hyperglycemic-hyperosmolar coma (nkhhc)   • Arthritis   • Hyperlipemia   • Hypertriglyceridemia, essential   • Diabetic foot ulcer associated with diabetes mellitus due to underlying condition   • Cellulitis of left foot   • Poorly controlled type 2 diabetes mellitus with peripheral neuropathy   • DUNLAP (nonalcoholic steatohepatitis)   • Hyponatremia   • Neutrophilic leukocytosis   • Hypomagnesemia   • Encephalopathy, hepatic   • Hypertension               LDA Wound       17 1545          Wound 17 0815 Left lateral foot other (see comments)    Wound - Properties Group Date first assessed: 17  -MF Time first assessed: 815  -MF Side: Left  -MF Orientation: lateral  -MF Location: foot  -MF Type: other (see comments)  -MF, 3rd, 4th, and 5th toe amputation site     Wound WDL WDL  -MF      Dressing Appearance no drainage;dry;intact  -MF      Base moist;pink;yellow;slough;tendon;bone  -MF      Periwound Area intact;dry;redness  -MF      Edges open;irregular  -MF      Wound Cleaning irrigated with;other (see comments)   phase one  -MF      Therapy Setting (Negative Pressure Wound Therapy) continuous therapy  -MF      Pressure Setting (Negative Pressure Wound Therapy) 125 mmHg  -MF      Dressing (Negative Pressure Wound Therapy) foam, green  -MF      Sponges Inserted (Negative Pressure Wound Therapy) 1   green  -MF       Sponges Removed (Negative Pressure Wound Therapy) 1   1silver 1 adaptic  -      Output (mL) 75  -MF        User Key  (r) = Recorded By, (t) = Taken By, (c) = Cosigned By    Initials Name Provider Type     Usman Johnson, PT Physical Therapist            Finger sweep and visual inspection performed. Empty wound bed confirmed.  External label applied and verified.            Adult Rehabilitation Note       02/17/17 1545 02/16/17 0900 02/16/17 0815    Rehab Assessment/Intervention    Discipline physical therapist  - physical therapy assistant  - physical therapist  -    Document Type therapy note (daily note);discharge summary  - therapy note (daily note)  - therapy note (daily note)  -    Subjective Information agree to therapy;complains of;weakness;fatigue;pain  - agree to therapy;complains of;pain  -UD agree to therapy;complains of;weakness;fatigue;pain  -    Patient Effort, Rehab Treatment  good  -UD     Symptoms Noted During/After Treatment  fatigue;increased pain  -UD     Precautions/Limitations  non-weight bearing status  -     Equipment Issued to Patient  --   knee walker  -UD     Recorded by [] Usman Johnson, PT [UD] Hali Valadez, PTA [] Usman Johnson, PT    Vital Signs    O2 Delivery Post Treatment  room air  -UD     Pre Patient Position  Supine  -UD     Intra Patient Position  Standing  -UD     Post Patient Position  Supine  -UD     Recorded by  [UD] Hali Valadez PTA     Pain Assessment    Pain Assessment [place]-Tavares FACES  - 0-10  -UD [place]-Baker FACES  -    Palom[place]-Baker FACES Pain Rating 4  -  2  -MF    Pain Score  2  -UD     Post Pain Score  4  -UD     Pain Type Acute pain  - Acute pain  -UD     Pain Location Foot  -MF Foot  -UD Foot  -MF    Pain Orientation Left  -MF Left  -UD Left  -MF    Pain Intervention(s) Repositioned  -MF Repositioned  -UD Repositioned;Medication (See MAR)  -    Recorded by [] Usman Johnson, PT [UD] Hali Valadez PTA []  Usman Johnson, PT    Cognitive Assessment/Intervention    Orientation Status  oriented x 4  -UD     Follows Commands/Answers Questions  100% of the time  -UD     Recorded by  [UD] Hali Valadez PTA     Bed Mobility, Assessment/Treatment    Bed Mob, Supine to Sit, [place]  conditional independence  -UD     Bed Mob, Sit to Supine, [place]  conditional independence  -UD     Recorded by  [UD] Hali Valadez PTA     Transfer Assessment/Treatment    Transfers, Sit-Stand [place]  stand by assist  -UD     Transfers, Stand-Sit [place]  stand by assist  -UD     Transfers, Sit-Stand-Sit, Assist Device  --   knee walker  -UD     Recorded by  [UD] Hali Valadez PTA     Gait Assessment/Treatment    Gait, [place] Level  contact guard assist;1 person + 1 person to manage equipment  -UD     Gait, Assistive Device  --   knee walker  -UD     Gait, Distance (Feet)  100  -UD     Gait, Impairments  pain  -UD     Recorded by  [UD] Hali Valadez PTA     Therapy Exercises    Right Lower Extremity  AROM:;10 reps;supine  -UD     Recorded by  [UD] Hali Valadez PTA     Positioning and Restraints    Pre-Treatment Position in bed  - in bed  -UD in bed  -    Post Treatment Position bed  - bed  -UD bed  -    In Bed supine;call light within reach  - supine;notified nsg;call light within reach;side rails up x2  -UD supine;call light within reach;with nsg  -MF    Recorded by [] Usman Johnson, PT [UD] Hali Valadez, ANDREW [] Usman Johnson, PT      User Key  (r) = Recorded By, (t) = Taken By, (c) = Cosigned By    Initials Name Effective Dates     Hali Valadez, ANDREW 06/22/15 -      Usman Johnson, PT 06/19/15 -                 IP PT Goals       02/17/17 1545 02/16/17 0952 02/15/17 1315    Bed Mobility PT LTG    Bed Mobility PT LTG, Outcome  goal met  -UD     Transfer Training PT LTG    Transfer Training PT LTG, Outcome  goal met  -UD     Gait Training PT LTG    Gait Training Goal PT LTG, Outcome  goal  met  -UD     Wound Care PT LTG    Wound Care PT LTG 1, Date Established   02/15/17  -MC    Wound Care PT LTG 1, Time to Achieve   1 wk  -MC    Wound Care PT LTG 1, Location   L foot  -    Wound Care PT LTG 1, Decrease Wound Size   10%  -MC    Wound Care PT LTG 1, Decrease Necrotic Tissue   25%  -MC    Wound Care PT LTG 1, Increase Granulation To   10%  -MC    Wound Care PT LTG 1, Education   wound care;progression of POC  -MC    Wound Care PT LTG 1, Education Understanding   verbalize understanding  -    Wound Care PT LTG 1, Outcome goal partially met  -        02/14/17 1806          Bed Mobility PT LTG    Bed Mobility PT LTG, Date Established 02/14/17  -DM      Bed Mobility PT LTG, Time to Achieve 1 wk  -DM      Bed Mobility PT LTG, Activity Type all bed mobility  -DM      Bed Mobility PT LTG, [place] Level independent  -DM      Transfer Training PT LTG    Transfer Training PT LTG, Date Established 02/14/17  -DM      Transfer Training PT LTG, Time to Achieve 1 wk  -DM      Transfer Training PT LTG, Activity Type bed to chair /chair to bed;sit to stand/stand to sit  -DM      Transfer Training PT LTG, [place] Level contact guard assist  -DM      Transfer Training PT LTG, Assist Device walker, standard   NWB L LE; STABLE VS  -DM      Gait Training PT LTG    Gait Training Goal PT LTG, Date Established 02/14/17  -DM      Gait Training Goal PT LTG, Time to Achieve 1 wk  -DM      Gait Training Goal PT LTG, [place] Level contact guard assist  -DM      Gait Training Goal PT LTG, Assist Device walker, standard   NWB L LE, W/ stable VS  -DM      Gait Training Goal PT LTG, Distance to Achieve 25  -DM        User Key  (r) = Recorded By, (t) = Taken By, (c) = Cosigned By    Initials Name Provider Type    HUNTER Valadez, PTA Physical Therapy Assistant    KARLOS Johnson, PT Physical Therapist    MANUEL Crawford, PT Physical Therapist    OLGA De Jesus, PT Physical Therapist          Physical  Therapy Education     Title: PT OT SLP Therapies (Active)     Topic: Physical Therapy (Active)     Point: Mobility training (Active)    Learning Progress Summary    Learner Readiness Method Response Comment Documented by Status   Patient Acceptance CECILY COOK,NR   02/16/17 0952 Done    Eager D,E NR   02/14/17 1806 Active   Significant Other Eager D,E NR   02/14/17 1806 Active               Point: Home exercise program (Active)    Learning Progress Summary    Learner Readiness Method Response Comment Documented by Status   Patient Acceptance CECILY COOK VU,NR   02/16/17 0952 Done    Eager D,E NR   02/14/17 1806 Active   Significant Other Eager D,E NR   02/14/17 1806 Active               Point: Body mechanics (Active)    Learning Progress Summary    Learner Readiness Method Response Comment Documented by Status   Patient Acceptance ECECILY,NR   02/16/17 0952 Done    Eager D,E NR   02/14/17 1806 Active   Significant Other Eager D,E NR   02/14/17 1806 Active               Point: Precautions (Active)    Learning Progress Summary    Learner Readiness Method Response Comment Documented by Status   Patient Acceptance ECECILY,NR   02/16/17 0952 Done    Eager D,E NR   02/14/17 1806 Active   Significant Other Eager D,E NR   02/14/17 1806 Active                      User Key     Initials Effective Dates Name Provider Type Discipline     06/22/15 -  Hali Valadez, PTA Physical Therapy Assistant PT     06/19/15 -  Kelly Crawford, PT Physical Therapist PT                   PT ASSESSMENT (last 72 hours)      PT Evaluation       02/17/17 1545 02/16/17 2000    Rehab Evaluation    Document Type therapy note (daily note);discharge summary  -     Subjective Information agree to therapy;complains of;weakness;fatigue;pain  -     Pain Assessment    Pain Assessment Palomino-Baker FACES  -     [place]-Baker FACES Pain Rating 4  -     Pain Score  0  -ZR (r) BM (t) ZR (c)    Pain Type Acute pain  -     Pain Location Foot  -      Pain Orientation Left  -MF     Pain Intervention(s) Repositioned  -MF     Positioning and Restraints    Pre-Treatment Position in bed  -     Post Treatment Position bed  -     In Bed supine;call light within reach  -       02/16/17 0900 02/16/17 0815    Rehab Evaluation    Document Type therapy note (daily note)  - therapy note (daily note)  -    Subjective Information agree to therapy;complains of;pain  -UD agree to therapy;complains of;weakness;fatigue;pain  -MF    Patient Effort, Rehab Treatment good  -UD     Symptoms Noted During/After Treatment fatigue;increased pain  -UD     General Information    Precautions/Limitations non-weight bearing status  -UD     Vital Signs    O2 Delivery Post Treatment room air  -UD     Pre Patient Position Supine  -UD     Intra Patient Position Standing  -UD     Post Patient Position Supine  -UD     Pain Assessment    Pain Assessment 0-10  -UD [place]-Tavares FACES  -    [place]-Baker FACES Pain Rating  2  -MF    Pain Score 2  -UD     Post Pain Score 4  -UD     Pain Type Acute pain  -UD     Pain Location Foot  -UD Foot  -MF    Pain Orientation Left  -UD Left  -MF    Pain Intervention(s) Repositioned  -UD Repositioned;Medication (See MAR)  -    Cognitive Assessment/Intervention    Orientation Status oriented x 4  -UD     Follows Commands/Answers Questions 100% of the time  -UD     Bed Mobility, Assessment/Treatment    Bed Mob, Supine to Sit, [place] conditional independence  -UD     Bed Mob, Sit to Supine, [place] conditional independence  -UD     Transfer Assessment/Treatment    Transfers, Sit-Stand [place] stand by assist  -UD     Transfers, Stand-Sit [place] stand by assist  -UD     Transfers, Sit-Stand-Sit, Assist Device --   knee walker  -UD     Gait Assessment/Treatment    Gait, [place] Level contact guard assist;1 person + 1 person to manage equipment  -UD     Gait, Assistive Device --   knee walker  -UD     Gait, Distance (Feet) 100  -UD      Gait, Impairments pain  -     Therapy Exercises    Right Lower Extremity AROM:;10 reps;supine  -UD     Positioning and Restraints    Pre-Treatment Position in bed  -UD in bed  -    Post Treatment Position bed  -UD bed  -MF    In Bed supine;notified nsg;call light within reach;side rails up x2  -UD supine;call light within reach;with nsg  -      02/15/17 1315       Rehab Evaluation    Document Type evaluation  -     Subjective Information no complaints;agree to therapy  -     General Information    Patient Profile Review yes  -     Onset of Illness/Injury or Date of Surgery Date 02/13/17  -     Referring Physician MD Michelle  -     Pertinent History Of Current Problem See PT mobility note for details. Pt wants to try approximately one month of aggressive wound care prior to considering an amputation.  -     Precautions/Limitations fall precautions;non-weight bearing status;other (see comments)   wound vac. NWB LLE  -     Plans/Goals Discussed With patient;spouse/S.O.;agreed upon  -     Risks Reviewed patient:;spouse/S.O.:;increased discomfort  -     Benefits Reviewed patient:;spouse/S.O.:;improve skin integrity  -     Barriers to Rehab medically complex;previous functional deficit  -     Clinical Impression    Date of Referral to PT 02/14/17  -     PT Diagnosis impaired skin integrity  -     Patient/Family Goals Statement To try to heal the wound.  -     Criteria for Skilled Therapeutic Interventions Met yes;treatment indicated  -     Rehab Potential fair, will monitor progress closely  -     Pain Assessment    Pain Assessment No/denies pain  -     Cognitive Assessment/Intervention    Current Cognitive/Communication Assessment functional  -     Orientation Status oriented x 4  -     Follows Commands/Answers Questions 100% of the time;able to follow single-step instructions  -     Positioning and Restraints    Pre-Treatment Position in bed  -     Post Treatment Position  bed  -MC     In Bed supine;call light within reach;encouraged to call for assist;with family/caregiver  -       User Key  (r) = Recorded By, (t) = Taken By, (c) = Cosigned By    Initials Name Provider Type    HUNTER Valadez PTA Physical Therapy Assistant    KARLOS Johnson, PT Physical Therapist    OLGA De Jesus, PT Physical Therapist     Jin Avelar, RN Registered Nurse    BERNARD Castrejon, Nursing Student Nursing Student            PT Recommendation and Plan  Anticipated Equipment Needs At Discharge: gait belt, walker, rolling knee walker (std wx vs. knee wx)  Anticipated Discharge Disposition: home with home health (per pt/spouse, they want to go home)  PT Frequency: daily    Plan Of Care Reviewed With: patient       Outcome Summary/Follow up Plan: Pt cont to have moderate slough and adipose in wound base, but will benefit from cont use of wound vac at d/c to help increase granulation and improve healing potential.             Outcome Measures       02/16/17 0900          How much help from another person do you currently need...    Turning from your back to your side while in flat bed without using bedrails? 4  -UD      Moving from lying on back to sitting on the side of a flat bed without bedrails? 4  -UD      Moving to and from a bed to a chair (including a wheelchair)? 3  -UD      Standing up from a chair using your arms (e.g., wheelchair, bedside chair)? 3  -UD      Climbing 3-5 steps with a railing? 1  -UD      To walk in hospital room? 3  -UD      AM-PAC 6 Clicks Score 18  -UD        User Key  (r) = Recorded By, (t) = Taken By, (c) = Cosigned By    Initials Name Provider Type    HUNTER Valadez PTA Physical Therapy Assistant            Time Calculation        PT Charges       02/17/17 1545          Time Calculation    Start Time 1545  -      Time Calculation- PT    Total Timed Code Minutes- PT 45 minute(s)  -        User Key  (r) = Recorded By, (t) = Taken By, (c) = Cosigned  By    Initials Name Provider Type     Usman Johnson, PT Physical Therapist            Therapy Charges for Today     Code Description Service Date Service Provider Modifiers Qty    38762122858 HC PT NEG PRESS WOUND OVER 50SQCM DME1 2/16/2017 Usman Johnson, PT  1    75017221395 HC PT THER SUPP EA 15 MIN 2/16/2017 Usman Johnson, PT GP 1    69423594766 HC PT NEG PRESS WOUND OVER 50SQCM DME1 2/17/2017 Usman Johnson, PT  1    07403714876 HC PT THER PROC EA 15 MIN 2/17/2017 Usman Johnson, PT GP 1    03550815565 HC PT THER SUPP EA 15 MIN 2/17/2017 Usman Johnson, PT GP 1            PT G-Codes  Outcome Measure Options: AM-PAC 6 Clicks Basic Mobility (PT)               Usman Johnson, PT  2/17/2017

## 2017-02-17 NOTE — PROGRESS NOTES
Continued Stay Note  [facility]     Patient Name: Kendrick Crystal  MRN: [id number]  Today's Date: 2/17/2017    Admit Date: 2/13/2017          Discharge Plan       02/17/17 1202    Case Management/Social Work Plan    Plan HOME with     Patient/Family In Agreement With Plan yes    Additional Comments Notified [facility] of finalized IV abx orders.  They will deliver meds/supplies to pt's room prior to DC today and provide instructions.  Notified Nicolas at [facility] of new referral and of pt's DC today.  Spoke with Usman in PT who is arranging home wound vac per Cheryl to be placed prior to DC today.  CM will cont to follow.              Discharge Codes     None            Christelle Cuevas

## 2017-02-17 NOTE — PROGRESS NOTES
[facility] Progress Note    2/13/2017      Antibiotics:  IV Anti-Infectives     Ordered     Dose/Rate Route Frequency Start Stop    02/15/17 1023  cefTRIAXone (ROCEPHIN) IVPB 2 g     Ordering Provider:  Usman Dong MD    2 g  over 30 Minutes Intravenous Daily 02/15/17 1200      02/15/17 1023  DAPTOmycin (CUBICIN) 750 mg in sodium chloride 0.9 % 50 mL IVPB     Ordering Provider:  Usman Dong MD    750 mg  100 mL/hr over 30 Minutes Intravenous Every 24 Hours 02/15/17 1200            CC: foot osteo    HPI:  Patient is a 46 y.o. Yr old male with history of history uncontrolled diabetes with extensive comorbidity as previously outlined. He has lower extremity vascular disease but no specific option for revascularization per discussion with Dr. Martinez. He was admitted January 2017 with left foot infection, MRI not confirming osteomyelitis, but had surgery on January 30 with metatarsals 3/4/5 amputated, marginal perfusion per operative note and MRSA/GB strep/coag-negative staph in his various cultures. He was transferred to [facility] on broad-spectrum IV antibiotics. While at [facility], he was under the care of Dr. Herrera and, per family/patient, he was told that he required hyperbaric oxygen therapy and had very little chance for long-term healing at the foot. He apparently was readmitted on February 13 to [facility] with patient interested in having higher level amputation.    2/15 He now wants to attempt salvage after d/w Dr Martinez as he does not want AKA unless he has given wound care/abx a longer trial;  He refuses hyperbaric at present but he will f/u with [facility] wound care to discuss options again    2/16/17   He wants current abx regimen to help facilitate home regimen with less frequent dosing to allow for help from family. I d/w him pros/cons of vanco -v- dapto -v- zyvox and relative risks/micro results/risk of drug interaction/risk of side effects, etc...  Lab carol did not do dapto EYAD from MRSA  "isolate and while in general he does not have high risk for dapto resistance, I am unable to confirm sensitivity;  Nonetheless, he prefers dapto for ease of home administration and prefers avoid further risk of other agents, particularly vanco after he d/w [facility];  He voices understanding the limitations/risks /challenges of each approach and he prefers daptomycin/rocephin.  If not improving with this approach, he knows he could reconsider alternative approach.    2/17/17 generally feels good with no new pain or problems.  He wants home.    ROS:      2/17/17 No f/c/s. No n/v/d. No rash. No new ADR to Abx.     PE:   Visit Vitals   • /96 (BP Location: Left arm, Patient Position: Lying)   • Pulse 103   • Temp 98.8 °F (37.1 °C) (Oral)   • Resp 18   • Ht 75\" (190.5 cm)   • Wt 271 lb 12.8 oz (123 kg)   • SpO2 95%   • BMI 33.97 kg/m2       GENERAL: Awake and alert, in no acute distress.   HEENT:  No conjunctival injection. No icterus. Oropharynx clear without evidence of thrush or exudate.     HEART: RRR; No murmur, rubs, gallops.   LUNGS: Clear to auscultation bilaterally without wheezing, rales, rhonchi. Normal respiratory effort. Nonlabored. No dullness.  ABDOMEN: Soft, nontender, nondistended. Positive bowel sounds. No rebound or guarding. NO mass or HSM.  EXT:  No cyanosis, clubbing or edema. No cord.  : Genitalia generally unremarkable.  Without Walker catheter.  SKIN: Warm and dry without cutaneous eruptions on Inspection/palpation.    Foot no new redness, purulence or necrosis;  No mass, buldge or fluctuance.  NO crepitus or bullae;  Large wound      Laboratory Data      Results from last 7 days  Lab Units 02/16/17  0557 02/13/17  2033   WBC 10*3/mm3 10.43 11.15*   HEMOGLOBIN g/dL 10.4* 10.9*   HEMATOCRIT % 32.0* 33.5*   PLATELETS 10*3/mm3 229 236       Results from last 7 days  Lab Units 02/16/17  0557   SODIUM mmol/L 136   POTASSIUM mmol/L 4.0   CHLORIDE mmol/L 100   TOTAL CO2 mmol/L 29.0   BUN " mg/dL 16   CREATININE mg/dL 0.50*   GLUCOSE mg/dL 177*   CALCIUM mg/dL 9.3       Results from last 7 days  Lab Units 02/16/17  0557   ALK PHOS U/L 163*   BILIRUBIN mg/dL 0.4   ALT (SGPT) U/L 21   AST (SGOT) U/L 22               Estimated Creatinine Clearance: 255.3 mL/min (by C-G formula based on Cr of 0.5).      Microbiology:      Radiology:  Imaging Results (last 72 hours)     Procedure Component Value Units Date/Time    XR Chest 1 View [[id number]]      Updated:  02/13/17 2053            Impression:   --Acute/severe/deep infection involving the left foot requiring surgery on January 30 consistent with osteomyelitis surgically, associated with marginal perfusion from a gross standpoint at the time of surgery and no specific option to improve vascular flow per discussion with Dr. Martinez, and has required metatarsals 3/4/5 resection. This is in the setting of significant comorbidity including uncontrolled diabetes. He has a high risk for chronic functional/limb loss including nonhealing and persistent/relapse/progression of infection that could include sepsis or more proximal spread in addition to metastatic foci of involvement. He has a risk for chronic pain and other disability associated with this. He and his family voice understanding these risks, they have considered the input from marta and Dr Martinez and they want to continue with abx/wound care for now. They refuse AKA for now but realize there is still a high chance for this. This option/timing/threshold for amputation will be up to Dr. Martinez in discussion with the family. He does not seem to have progressive infection at present, albeit with substantial tissue defect and high risk for further sequela as outlined above. He understands the risks of continued antibiotics as outlined below as well.      --Diabetes. Type II. You need to tightly control blood sugar to give best chance for healing      --History stage IV hepatic fibrosis and diagnosis  FABI     --PVD as above    PLAN:   -- IV daptomycin/rocephin    --I discussed potential risks and benefits of the prescribed antibiotics that include, but are not limited to, solid organ toxicity, neuro toxicity, renal toxicity, CDiff, cytopenias, pulm/muscle/neuro tox, hypersensitivity, etc.. Patient/Family voice understanding and agree to proceed.     --Monitor IV and IV antibiotics with risk for systemic complication and potential drug interaction     --Check/review labs cultures and scans     --Discussed with family and patient as outlined above regarding complexities involved with decision-making and future plans     --[facility]     --History per nursing staff as well     --I discussed with Dr. Martinez directly    --Home today okay with me after arrangements made by case management.  Discussed with family        Usman Dong MD  2/17/2017

## 2017-02-17 NOTE — PAYOR COMM NOTE
"BonillaElliot (46 y.o. Male) Auth # [id number] Discharge notification     Date of Birth Social Security Number Address Home Phone MRN    1970  [address] [place], [place] [address] [phone] [id number]    Oriental orthodox Marital Status          Yazdanism        Admission Date Admission Type Admitting Provider Attending Provider Department, Room/Bed    2/13/17 Elective Shahbaz Valdivia MD  [address], S454/1    Discharge Date Discharge Disposition Discharge Destination        2/17/2017 Home or Self Care Home            Attending Provider: (none)    Allergies:  No Known Drug Allergy    Isolation:  None   Infection:  None   Code Status:  FULL    Ht:  75\" (190.5 cm)   Wt:  271 lb 12.8 oz (123 kg)    Admission Cmt:  None   Principal Problem:  Diabetic foot ulcer associated with diabetes mellitus due to underlying condition [E08.621,L97.509]                 Active Insurance as of 2/13/2017     Primary Coverage     Payor Plan Insurance Group Employer/Plan Group    [employer] MEDICAID [employer] [id number]     Payor Plan Address Payor Plan Phone Number Effective From Effective To    PO  [phone] 1/18/2017     [place] [address]       Subscriber Name Subscriber Birth Date Member ID       ELLIOT CRYSTAL 1970 [id number]                 Emergency Contacts      (Rel.) Home Phone Work Phone Mobile Phone    Cindy Crystal (Spouse) [phone] -- [phone]            Insurance Information                [employer] MEDICAID/[employer] Phone: [phone]    Subscriber: Elliot Crystal Subscriber#: [id number]    Group#: [id number] Precert#:           "

## 2017-02-18 LAB
BACTERIA SPEC AEROBE CULT: NORMAL
BACTERIA SPEC AEROBE CULT: NORMAL

## 2017-02-20 ENCOUNTER — LAB REQUISITION (OUTPATIENT)
Dept: LAB | Facility: HOSPITAL | Age: 47
End: 2017-02-20

## 2017-02-20 DIAGNOSIS — A41.89 OTHER SPECIFIED SEPSIS (HCC): ICD-10-CM

## 2017-02-20 DIAGNOSIS — Z89.422 ACQUIRED ABSENCE OF OTHER LEFT TOE(S) (HCC): ICD-10-CM

## 2017-02-20 LAB
ALBUMIN SERPL-MCNC: 3.4 G/DL (ref 3.5–5)
ALBUMIN/GLOB SERPL: 0.9 G/DL (ref 1–2)
ALP SERPL-CCNC: 154 U/L (ref 38–126)
ALT SERPL W P-5'-P-CCNC: 36 U/L (ref 13–69)
ANION GAP SERPL CALCULATED.3IONS-SCNC: 14.4 MMOL/L
AST SERPL-CCNC: 43 U/L (ref 15–46)
BASOPHILS # BLD AUTO: 0.04 10*3/MM3 (ref 0–0.2)
BASOPHILS NFR BLD AUTO: 1 % (ref 0–2.5)
BILIRUB SERPL-MCNC: 0.5 MG/DL (ref 0.2–1.3)
BUN BLD-MCNC: 17 MG/DL (ref 7–20)
BUN/CREAT SERPL: 34 (ref 6.3–21.9)
CALCIUM SPEC-SCNC: 8.9 MG/DL (ref 8.4–10.2)
CHLORIDE SERPL-SCNC: 100 MMOL/L (ref 98–107)
CK SERPL-CCNC: 42 U/L (ref 30–170)
CO2 SERPL-SCNC: 29 MMOL/L (ref 26–30)
CREAT BLD-MCNC: 0.5 MG/DL (ref 0.6–1.3)
CRP SERPL-MCNC: 4.9 MG/DL (ref 0–1)
DEPRECATED RDW RBC AUTO: 42.5 FL (ref 37–54)
EOSINOPHIL # BLD AUTO: 0.04 10*3/MM3 (ref 0–0.7)
EOSINOPHIL NFR BLD AUTO: 1 % (ref 0–7)
ERYTHROCYTE [DISTWIDTH] IN BLOOD BY AUTOMATED COUNT: 13.8 % (ref 11.5–14.5)
ERYTHROCYTE [SEDIMENTATION RATE] IN BLOOD: 81 MM/HR (ref 0–15)
GFR SERPL CREATININE-BSD FRML MDRD: >150 ML/MIN/1.73
GLOBULIN UR ELPH-MCNC: 3.6 GM/DL
GLUCOSE BLD-MCNC: 154 MG/DL (ref 74–98)
HCT VFR BLD AUTO: 35.9 % (ref 42–52)
HGB BLD-MCNC: 11.6 G/DL (ref 14–18)
IMM GRANULOCYTES # BLD: 0.01 10*3/MM3 (ref 0–0.06)
IMM GRANULOCYTES NFR BLD: 0.2 % (ref 0–0.6)
LYMPHOCYTES # BLD AUTO: 1.03 10*3/MM3 (ref 0.6–3.4)
LYMPHOCYTES NFR BLD AUTO: 24.8 % (ref 10–50)
MCH RBC QN AUTO: 27.5 PG (ref 27–31)
MCHC RBC AUTO-ENTMCNC: 32.3 G/DL (ref 30–37)
MCV RBC AUTO: 85.1 FL (ref 80–94)
MONOCYTES # BLD AUTO: 0.62 10*3/MM3 (ref 0–0.9)
MONOCYTES NFR BLD AUTO: 14.9 % (ref 0–12)
NEUTROPHILS # BLD AUTO: 2.42 10*3/MM3 (ref 2–6.9)
NEUTROPHILS NFR BLD AUTO: 58.1 % (ref 37–80)
NRBC BLD MANUAL-RTO: 0 /100 WBC (ref 0–0)
PLATELET # BLD AUTO: 254 10*3/MM3 (ref 130–400)
PMV BLD AUTO: 10.5 FL (ref 6–12)
POTASSIUM BLD-SCNC: 4.4 MMOL/L (ref 3.5–5.1)
PROT SERPL-MCNC: 7 G/DL (ref 6.3–8.2)
RBC # BLD AUTO: 4.22 10*6/MM3 (ref 4.7–6.1)
SODIUM BLD-SCNC: 139 MMOL/L (ref 137–145)
WBC NRBC COR # BLD: 4.16 10*3/MM3 (ref 4.8–10.8)

## 2017-02-20 PROCEDURE — 82550 ASSAY OF CK (CPK): CPT | Performed by: INTERNAL MEDICINE

## 2017-02-20 PROCEDURE — 86140 C-REACTIVE PROTEIN: CPT | Performed by: INTERNAL MEDICINE

## 2017-02-20 PROCEDURE — 85651 RBC SED RATE NONAUTOMATED: CPT | Performed by: INTERNAL MEDICINE

## 2017-02-20 PROCEDURE — 85025 COMPLETE CBC W/AUTO DIFF WBC: CPT | Performed by: INTERNAL MEDICINE

## 2017-02-20 PROCEDURE — 80053 COMPREHEN METABOLIC PANEL: CPT | Performed by: INTERNAL MEDICINE

## 2017-02-22 ENCOUNTER — OFFICE VISIT (OUTPATIENT)
Dept: CARDIAC SURGERY | Facility: CLINIC | Age: 47
End: 2017-02-22

## 2017-02-22 VITALS
HEIGHT: 75 IN | TEMPERATURE: 97.4 F | DIASTOLIC BLOOD PRESSURE: 128 MMHG | OXYGEN SATURATION: 98 % | HEART RATE: 89 BPM | SYSTOLIC BLOOD PRESSURE: 159 MMHG | BODY MASS INDEX: 30.34 KG/M2 | WEIGHT: 244 LBS

## 2017-02-22 DIAGNOSIS — E08.621 DIABETIC ULCER OF LEFT FOOT ASSOCIATED WITH DIABETES MELLITUS DUE TO UNDERLYING CONDITION (HCC): Primary | ICD-10-CM

## 2017-02-22 DIAGNOSIS — L97.529 DIABETIC ULCER OF LEFT FOOT ASSOCIATED WITH DIABETES MELLITUS DUE TO UNDERLYING CONDITION (HCC): Primary | ICD-10-CM

## 2017-02-22 PROCEDURE — 99024 POSTOP FOLLOW-UP VISIT: CPT | Performed by: PHYSICIAN ASSISTANT

## 2017-02-22 RX ORDER — SODIUM CHLORIDE 9 MG/ML
INJECTION, SOLUTION INTRAVENOUS
COMMUNITY
Start: 2017-02-20 | End: 2017-03-07 | Stop reason: HOSPADM

## 2017-02-22 NOTE — PROGRESS NOTES
"02/22/2017  Patient Information  Kendrick Crystal                                                                                          [address] [place] [id number]   1970  'PCP/Referring Physician'  Juan Rios MD  [phone]  No ref. provider found    Chief Complaint   Patient presents with   • Follow-up     2/[facility] HOSP F/U PER [facility] 2/3/17 FOR 3RD AND 4TH AND 5TH TOE AMPUTATION       History of Present Illness:  Patient presents to office today for follow up of his left 3rd, 4th and 5th transmetatarsal amputation. He denies any fevers, chills or sweats and has been receiving IV antibiotics via his PICC line as prescribed by Dr. Dong, whom he has an appointment with this afternoon. He has a knee roller he uses to get around with as he cannot bear weight on his foot. He does state that he has \"missed several\" regimens of his antibiotics because he has a hard time \"keeping up\" with it.     Patient Active Problem List   Diagnosis   • Cirrhosis   • Essential hypertension   • Non-compliance   • Type 2 diabetes mellitus with hyperosmolarity without nonketotic hyperglycemic-hyperosmolar coma (nkhhc)   • Arthritis   • Hyperlipemia   • Hypertriglyceridemia, essential   • Diabetic foot ulcer associated with diabetes mellitus due to underlying condition   • Cellulitis of left foot   • Poorly controlled type 2 diabetes mellitus with peripheral neuropathy   • DUNLAP (nonalcoholic steatohepatitis)   • Hyponatremia   • Neutrophilic leukocytosis   • Hypomagnesemia   • Encephalopathy, hepatic   • Hypertension     Past Medical History   Diagnosis Date   • Arthritis    • Cirrhosis    • Counseling for insulin pump    • Diabetes mellitus    • Encephalopathy, hepatic    • H/O degenerative disc disease    • History of Lissa's gangrene    • Hyperlipemia    • Hypertension    • multiple Skin abscesses    • DUNLAP, bx showed stage IV fibrosis    • Neuropathy      Past Surgical History   Procedure Laterality Date   • " Testicle surgery       DEBRIDEMENT FROM GANGRENE   • Leg surgery     • Amputation digit Left 1/30/2017     Procedure: left fourth and fifth transmetatarsal toe amputation ;  Surgeon: Juancho Martinez MD;  Location: [facility];  Service:        Current Outpatient Prescriptions:   •  aspirin 81 MG EC tablet, Take 1 tablet by mouth Daily., Disp: , Rfl:   •  atorvastatin (LIPITOR) 40 MG tablet, Take 1 tablet by mouth Every Night., Disp: , Rfl:   •  cefTRIAXone (ROCEPHIN) 40 MG/ML IVPB, Infuse 50 mL into a venous catheter Daily. Indications: Bone and Joint Infection, Disp: , Rfl:   •  DAPTOmycin 750 mg in sodium chloride 0.9 % 50 mL, Infuse 750 mg into a venous catheter Daily. Indications: Bone and Joint Infection, Disp: , Rfl:   •  docusate sodium 100 MG capsule, Take 100 mg by mouth 2 (Two) Times a Day., Disp: , Rfl:   •  famotidine (PEPCID) 20 MG tablet, Take 1 tablet by mouth 2 (Two) Times a Day., Disp: , Rfl:   •  gabapentin (NEURONTIN) 400 MG capsule, Take 3 capsules by mouth 3 (Three) Times a Day., Disp: , Rfl:   •  HYDROcodone-acetaminophen (NORCO)  MG per tablet, Take 1 tablet by mouth 3 (Three) Times a Day As Needed for moderate pain (4-6) for up to 7 days., Disp: 9 tablet, Rfl: 0  •  insulin lispro (humaLOG) 100 UNIT/ML injection, Inject 10 Units under the skin 3 (Three) Times a Day With Meals., Disp: , Rfl:   •  losartan (COZAAR) 100 MG tablet, Take 1 tablet by mouth Daily., Disp: 30 tablet, Rfl: 0  •  melatonin 5 MG sublingual tablet sublingual tablet, Place 1 tablet under the tongue At Night As Needed (insomnia)., Disp: , Rfl: 0  •  povidone-iodine (BETADINE) 10 % ointment, Apply  topically Daily. To bilateral feet ulcerations, Disp: , Rfl: 0  •  probiotic (CULTURELLE) capsule capsule, Take 1 capsule by mouth Daily., Disp: 30 capsule, Rfl:   •  sodium chloride 0.9 % solution, , Disp: , Rfl:   •  insulin detemir (LEVEMIR) 100 UNIT/ML injection, Inject 30 Units under the skin Every 12 (Twelve) Hours.,  "Disp: , Rfl:   •  insulin lispro (humaLOG) 100 UNIT/ML injection, Inject 2-7 Units under the skin 4 (Four) Times a Day With Meals & at Bedtime., Disp: , Rfl:   •  polyethylene glycol (MIRALAX) packet, Take 17 g by mouth Daily., Disp: , Rfl:   Allergies   Allergen Reactions   • No Known Drug Allergy      Social History     Social History   • Marital status:      Spouse name: N/A   • Number of children: N/A   • Years of education: N/A     Occupational History   • Not on file.     Social History Main Topics   • Smoking status: Never Smoker   • Smokeless tobacco: Never Used   • Alcohol use No   • Drug use: No   • Sexual activity: Not on file     Other Topics Concern   • Not on file     Social History Narrative     Family History   Problem Relation Age of Onset   • Arthritis Mother    • Cancer Mother    • Hypertension Mother    • Kidney disease Mother    • Stroke Mother    • Heart attack Father    • Arthritis Father    • Diabetes Father    • Hyperlipidemia Father    • Hypertension Father    • Mental illness Sister    • Diabetes Brother    • Hypertension Brother    • Obesity Brother      ROS: As per HPI, otherwise negative.   Vitals:    02/22/17 0901   BP: (!) 159/128   BP Location: Right arm   Patient Position: Sitting   Pulse: 89   Temp: 97.4 °F (36.3 °C)   TempSrc: Temporal Artery    SpO2: 98%   Weight: 244 lb (111 kg)   Height: 75\" (190.5 cm)      Physical Exam:  Gen - NAD, normal affect   CV - Regular rate and rhythm, no murmur/gallop/rub  Pulm - Lungs clear to auscultation without wheeze, rhonchi or rales  Abd - Soft, normoactive bowel sounds, non-distended, non-tender  Ext - Edges of incision look good, there is eschar present on the 2nd toe as well as a portion on the inside of the wound with necrotic tissue present. 3+ pitting edema is present on the left lower extremity without evidence of erythema or purulent discharge. There is no streaking present on the left lower extremity.   The borders of the large " wound appear to be viable.  However, there is definite eschar in the area surrounding the second toe at the borders of the wound and on the dorsum of the foot of the open wound.  Most likely the second toe will have to be amputated in the future if the patient decides to proceed ahead with the wound VAC rather than higher level amputation, either a below-knee amputation or above-knee amputation.  Labs/Imaging: n/a    Assessment/Plan:  Home health to re-attach wound vac. Patient has appointment with ID today, IV antibiotics per Dr. Dong's instructions. Will have patient follow up with Dr. Martinez in 2 weeks time to re-evaluate wound and debride in office vs. Consider BKA/AKA.  At the present time, the patient is considering the higher level amputation, but still has not made up his mind regarding this.    Patient Active Problem List   Diagnosis   • Cirrhosis   • Essential hypertension   • Non-compliance   • Type 2 diabetes mellitus with hyperosmolarity without nonketotic hyperglycemic-hyperosmolar coma (nkhhc)   • Arthritis   • Hyperlipemia   • Hypertriglyceridemia, essential   • Diabetic foot ulcer associated with diabetes mellitus due to underlying condition   • Cellulitis of left foot   • Poorly controlled type 2 diabetes mellitus with peripheral neuropathy   • DUNLAP (nonalcoholic steatohepatitis)   • Hyponatremia   • Neutrophilic leukocytosis   • Hypomagnesemia   • Encephalopathy, hepatic   • Hypertension     Signed by: Murtaza Arriola M.D.

## 2017-02-28 ENCOUNTER — HOSPITAL ENCOUNTER (INPATIENT)
Facility: HOSPITAL | Age: 47
LOS: 7 days | Discharge: NURSING, REHAB OR LONG-TERM CARE | End: 2017-03-07
Attending: EMERGENCY MEDICINE | Admitting: FAMILY MEDICINE

## 2017-02-28 ENCOUNTER — APPOINTMENT (OUTPATIENT)
Dept: GENERAL RADIOLOGY | Facility: HOSPITAL | Age: 47
End: 2017-02-28

## 2017-02-28 DIAGNOSIS — L08.9 DIABETIC FOOT INFECTION (HCC): Primary | ICD-10-CM

## 2017-02-28 DIAGNOSIS — Z74.09 IMPAIRED FUNCTIONAL MOBILITY, BALANCE, GAIT, AND ENDURANCE: ICD-10-CM

## 2017-02-28 DIAGNOSIS — E11.628 DIABETIC FOOT INFECTION (HCC): Primary | ICD-10-CM

## 2017-02-28 DIAGNOSIS — M86.9 OSTEOMYELITIS OF LEFT FOOT, UNSPECIFIED CHRONICITY: ICD-10-CM

## 2017-02-28 DIAGNOSIS — Z78.9 IMPAIRED MOBILITY AND ADLS: ICD-10-CM

## 2017-02-28 DIAGNOSIS — Z74.09 IMPAIRED MOBILITY AND ADLS: ICD-10-CM

## 2017-02-28 DIAGNOSIS — R65.10 SIRS (SYSTEMIC INFLAMMATORY RESPONSE SYNDROME) (HCC): ICD-10-CM

## 2017-02-28 PROBLEM — M86.272 SUBACUTE OSTEOMYELITIS OF LEFT FOOT (HCC): Status: ACTIVE | Noted: 2017-02-28

## 2017-02-28 PROBLEM — E11.40 DIABETIC NEUROPATHY (HCC): Status: ACTIVE | Noted: 2017-02-28

## 2017-02-28 PROBLEM — A41.9 SEPSIS (HCC): Status: ACTIVE | Noted: 2017-02-28

## 2017-02-28 LAB
ABO GROUP BLD: NORMAL
ANION GAP SERPL CALCULATED.3IONS-SCNC: 11 MMOL/L (ref 3–11)
APTT PPP: 31 SECONDS (ref 24–31)
BASOPHILS # BLD AUTO: 0.02 10*3/MM3 (ref 0–0.2)
BASOPHILS NFR BLD AUTO: 0.1 % (ref 0–1)
BLD GP AB SCN SERPL QL: NEGATIVE
BUN BLD-MCNC: 19 MG/DL (ref 9–23)
BUN/CREAT SERPL: 23.8 (ref 7–25)
CALCIUM SPEC-SCNC: 9.8 MG/DL (ref 8.7–10.4)
CHLORIDE SERPL-SCNC: 92 MMOL/L (ref 99–109)
CO2 SERPL-SCNC: 26 MMOL/L (ref 20–31)
CREAT BLD-MCNC: 0.8 MG/DL (ref 0.6–1.3)
CRP SERPL-MCNC: 135.1 MG/DL (ref 0–10)
D-LACTATE SERPL-SCNC: 1.8 MMOL/L (ref 0.5–2)
DEPRECATED RDW RBC AUTO: 41.9 FL (ref 37–54)
EOSINOPHIL # BLD AUTO: 0 10*3/MM3 (ref 0.1–0.3)
EOSINOPHIL NFR BLD AUTO: 0 % (ref 0–3)
ERYTHROCYTE [DISTWIDTH] IN BLOOD BY AUTOMATED COUNT: 13.9 % (ref 11.3–14.5)
ERYTHROCYTE [SEDIMENTATION RATE] IN BLOOD: 127 MM/HR (ref 0–15)
GFR SERPL CREATININE-BSD FRML MDRD: 103 ML/MIN/1.73
GLUCOSE BLD-MCNC: 255 MG/DL (ref 70–100)
GLUCOSE BLDC GLUCOMTR-MCNC: 189 MG/DL (ref 70–130)
HBA1C MFR BLD: 9.7 % (ref 4.8–5.6)
HCT VFR BLD AUTO: 38.2 % (ref 38.9–50.9)
HGB BLD-MCNC: 12.8 G/DL (ref 13.1–17.5)
HOLD SPECIMEN: NORMAL
HOLD SPECIMEN: NORMAL
IMM GRANULOCYTES # BLD: 0.09 10*3/MM3 (ref 0–0.03)
IMM GRANULOCYTES NFR BLD: 0.5 % (ref 0–0.6)
INR PPP: 1.27
LYMPHOCYTES # BLD AUTO: 1.32 10*3/MM3 (ref 0.6–4.8)
LYMPHOCYTES NFR BLD AUTO: 7.1 % (ref 24–44)
MCH RBC QN AUTO: 27.5 PG (ref 27–31)
MCHC RBC AUTO-ENTMCNC: 33.5 G/DL (ref 32–36)
MCV RBC AUTO: 82.2 FL (ref 80–99)
MONOCYTES # BLD AUTO: 2.01 10*3/MM3 (ref 0–1)
MONOCYTES NFR BLD AUTO: 10.9 % (ref 0–12)
NEUTROPHILS # BLD AUTO: 15.03 10*3/MM3 (ref 1.5–8.3)
NEUTROPHILS NFR BLD AUTO: 81.4 % (ref 41–71)
PLATELET # BLD AUTO: 499 10*3/MM3 (ref 150–450)
PMV BLD AUTO: 10.6 FL (ref 6–12)
POTASSIUM BLD-SCNC: 4.3 MMOL/L (ref 3.5–5.5)
PROCALCITONIN SERPL-MCNC: 0.19 NG/ML
PROTHROMBIN TIME: 13.9 SECONDS (ref 9.6–11.5)
RBC # BLD AUTO: 4.65 10*6/MM3 (ref 4.2–5.76)
RH BLD: POSITIVE
SODIUM BLD-SCNC: 129 MMOL/L (ref 132–146)
WBC NRBC COR # BLD: 18.47 10*3/MM3 (ref 3.5–10.8)
WHOLE BLOOD HOLD SPECIMEN: NORMAL
WHOLE BLOOD HOLD SPECIMEN: NORMAL

## 2017-02-28 PROCEDURE — 87147 CULTURE TYPE IMMUNOLOGIC: CPT | Performed by: EMERGENCY MEDICINE

## 2017-02-28 PROCEDURE — 82962 GLUCOSE BLOOD TEST: CPT

## 2017-02-28 PROCEDURE — 73630 X-RAY EXAM OF FOOT: CPT

## 2017-02-28 PROCEDURE — 87077 CULTURE AEROBIC IDENTIFY: CPT | Performed by: EMERGENCY MEDICINE

## 2017-02-28 PROCEDURE — 86901 BLOOD TYPING SEROLOGIC RH(D): CPT

## 2017-02-28 PROCEDURE — 84145 PROCALCITONIN (PCT): CPT | Performed by: EMERGENCY MEDICINE

## 2017-02-28 PROCEDURE — 27880 AMPUTATION OF LOWER LEG: CPT | Performed by: THORACIC SURGERY (CARDIOTHORACIC VASCULAR SURGERY)

## 2017-02-28 PROCEDURE — 25010000003 CEFTRIAXONE PER 250 MG: Performed by: EMERGENCY MEDICINE

## 2017-02-28 PROCEDURE — 86850 RBC ANTIBODY SCREEN: CPT

## 2017-02-28 PROCEDURE — 85652 RBC SED RATE AUTOMATED: CPT | Performed by: EMERGENCY MEDICINE

## 2017-02-28 PROCEDURE — 85025 COMPLETE CBC W/AUTO DIFF WBC: CPT | Performed by: EMERGENCY MEDICINE

## 2017-02-28 PROCEDURE — 99223 1ST HOSP IP/OBS HIGH 75: CPT | Performed by: FAMILY MEDICINE

## 2017-02-28 PROCEDURE — 80048 BASIC METABOLIC PNL TOTAL CA: CPT | Performed by: EMERGENCY MEDICINE

## 2017-02-28 PROCEDURE — 25010000002 DAPTOMYCIN PER 1 MG: Performed by: INTERNAL MEDICINE

## 2017-02-28 PROCEDURE — 87150 DNA/RNA AMPLIFIED PROBE: CPT | Performed by: EMERGENCY MEDICINE

## 2017-02-28 PROCEDURE — 63710000001 INSULIN LISPRO (HUMAN) PER 5 UNITS: Performed by: FAMILY MEDICINE

## 2017-02-28 PROCEDURE — 87186 SC STD MICRODIL/AGAR DIL: CPT | Performed by: EMERGENCY MEDICINE

## 2017-02-28 PROCEDURE — 83036 HEMOGLOBIN GLYCOSYLATED A1C: CPT | Performed by: FAMILY MEDICINE

## 2017-02-28 PROCEDURE — 86900 BLOOD TYPING SEROLOGIC ABO: CPT

## 2017-02-28 PROCEDURE — 85610 PROTHROMBIN TIME: CPT | Performed by: EMERGENCY MEDICINE

## 2017-02-28 PROCEDURE — 99284 EMERGENCY DEPT VISIT MOD MDM: CPT

## 2017-02-28 PROCEDURE — 87040 BLOOD CULTURE FOR BACTERIA: CPT | Performed by: EMERGENCY MEDICINE

## 2017-02-28 PROCEDURE — 63710000001 INSULIN DETEMIR PER 5 UNITS: Performed by: FAMILY MEDICINE

## 2017-02-28 PROCEDURE — 36415 COLL VENOUS BLD VENIPUNCTURE: CPT

## 2017-02-28 PROCEDURE — 83605 ASSAY OF LACTIC ACID: CPT | Performed by: EMERGENCY MEDICINE

## 2017-02-28 PROCEDURE — 85730 THROMBOPLASTIN TIME PARTIAL: CPT | Performed by: EMERGENCY MEDICINE

## 2017-02-28 PROCEDURE — 86140 C-REACTIVE PROTEIN: CPT | Performed by: EMERGENCY MEDICINE

## 2017-02-28 RX ORDER — MORPHINE SULFATE 2 MG/ML
1 INJECTION, SOLUTION INTRAMUSCULAR; INTRAVENOUS EVERY 4 HOURS PRN
Status: DISCONTINUED | OUTPATIENT
Start: 2017-02-28 | End: 2017-03-07 | Stop reason: HOSPADM

## 2017-02-28 RX ORDER — NALOXONE HCL 0.4 MG/ML
0.4 VIAL (ML) INJECTION
Status: DISCONTINUED | OUTPATIENT
Start: 2017-02-28 | End: 2017-03-07 | Stop reason: HOSPADM

## 2017-02-28 RX ORDER — POLYETHYLENE GLYCOL 3350 17 G/17G
17 POWDER, FOR SOLUTION ORAL DAILY
Status: DISCONTINUED | OUTPATIENT
Start: 2017-02-28 | End: 2017-03-07 | Stop reason: HOSPADM

## 2017-02-28 RX ORDER — SODIUM CHLORIDE 0.9 % (FLUSH) 0.9 %
10 SYRINGE (ML) INJECTION AS NEEDED
Status: DISCONTINUED | OUTPATIENT
Start: 2017-02-28 | End: 2017-03-07 | Stop reason: HOSPADM

## 2017-02-28 RX ORDER — LACTOBACILLUS RHAMNOSUS GG 10B CELL
1 CAPSULE ORAL DAILY
Status: DISCONTINUED | OUTPATIENT
Start: 2017-02-28 | End: 2017-03-07 | Stop reason: HOSPADM

## 2017-02-28 RX ORDER — DOCUSATE SODIUM 100 MG/1
100 CAPSULE, LIQUID FILLED ORAL 2 TIMES DAILY
Status: DISCONTINUED | OUTPATIENT
Start: 2017-02-28 | End: 2017-03-07 | Stop reason: HOSPADM

## 2017-02-28 RX ORDER — ASPIRIN 81 MG/1
81 TABLET ORAL DAILY
Status: DISCONTINUED | OUTPATIENT
Start: 2017-02-28 | End: 2017-03-07 | Stop reason: HOSPADM

## 2017-02-28 RX ORDER — LOSARTAN POTASSIUM 50 MG/1
100 TABLET ORAL
Status: DISCONTINUED | OUTPATIENT
Start: 2017-02-28 | End: 2017-03-07 | Stop reason: HOSPADM

## 2017-02-28 RX ORDER — NICOTINE POLACRILEX 4 MG
15 LOZENGE BUCCAL
Status: DISCONTINUED | OUTPATIENT
Start: 2017-02-28 | End: 2017-03-07 | Stop reason: HOSPADM

## 2017-02-28 RX ORDER — SODIUM CHLORIDE 0.9 % (FLUSH) 0.9 %
1-10 SYRINGE (ML) INJECTION AS NEEDED
Status: DISCONTINUED | OUTPATIENT
Start: 2017-02-28 | End: 2017-03-07 | Stop reason: HOSPADM

## 2017-02-28 RX ORDER — FAMOTIDINE 20 MG/1
20 TABLET, FILM COATED ORAL 2 TIMES DAILY
Status: DISCONTINUED | OUTPATIENT
Start: 2017-02-28 | End: 2017-03-07 | Stop reason: HOSPADM

## 2017-02-28 RX ORDER — CEFTRIAXONE SODIUM 2 G/50ML
2 INJECTION, SOLUTION INTRAVENOUS DAILY
Status: DISCONTINUED | OUTPATIENT
Start: 2017-03-01 | End: 2017-03-01

## 2017-02-28 RX ORDER — GABAPENTIN 300 MG/1
1200 CAPSULE ORAL EVERY 8 HOURS SCHEDULED
Status: DISCONTINUED | OUTPATIENT
Start: 2017-02-28 | End: 2017-03-07 | Stop reason: HOSPADM

## 2017-02-28 RX ORDER — ONDANSETRON 2 MG/ML
4 INJECTION INTRAMUSCULAR; INTRAVENOUS EVERY 6 HOURS PRN
Status: DISCONTINUED | OUTPATIENT
Start: 2017-02-28 | End: 2017-03-07 | Stop reason: HOSPADM

## 2017-02-28 RX ORDER — ACETAMINOPHEN 325 MG/1
650 TABLET ORAL EVERY 4 HOURS PRN
Status: DISCONTINUED | OUTPATIENT
Start: 2017-02-28 | End: 2017-03-07 | Stop reason: HOSPADM

## 2017-02-28 RX ORDER — SODIUM CHLORIDE 9 MG/ML
100 INJECTION, SOLUTION INTRAVENOUS CONTINUOUS
Status: DISCONTINUED | OUTPATIENT
Start: 2017-02-28 | End: 2017-03-07 | Stop reason: HOSPADM

## 2017-02-28 RX ORDER — OXYCODONE HYDROCHLORIDE AND ACETAMINOPHEN 5; 325 MG/1; MG/1
1 TABLET ORAL EVERY 4 HOURS PRN
Status: DISCONTINUED | OUTPATIENT
Start: 2017-02-28 | End: 2017-03-02

## 2017-02-28 RX ORDER — DEXTROSE MONOHYDRATE 25 G/50ML
25 INJECTION, SOLUTION INTRAVENOUS
Status: DISCONTINUED | OUTPATIENT
Start: 2017-02-28 | End: 2017-03-07 | Stop reason: HOSPADM

## 2017-02-28 RX ORDER — CEFTRIAXONE SODIUM 1 G/50ML
1 INJECTION, SOLUTION INTRAVENOUS ONCE
Status: COMPLETED | OUTPATIENT
Start: 2017-02-28 | End: 2017-02-28

## 2017-02-28 RX ORDER — ATORVASTATIN CALCIUM 40 MG/1
40 TABLET, FILM COATED ORAL NIGHTLY
Status: DISCONTINUED | OUTPATIENT
Start: 2017-02-28 | End: 2017-03-01

## 2017-02-28 RX ADMIN — INSULIN LISPRO 2 UNITS: 100 INJECTION, SOLUTION INTRAVENOUS; SUBCUTANEOUS at 21:33

## 2017-02-28 RX ADMIN — LOSARTAN POTASSIUM 100 MG: 50 TABLET, FILM COATED ORAL at 20:10

## 2017-02-28 RX ADMIN — CEFTRIAXONE SODIUM 1 G: 1 INJECTION, SOLUTION INTRAVENOUS at 18:12

## 2017-02-28 RX ADMIN — SODIUM CHLORIDE 100 ML/HR: 900 INJECTION INTRAVENOUS at 20:10

## 2017-02-28 RX ADMIN — FAMOTIDINE 20 MG: 20 TABLET ORAL at 20:08

## 2017-02-28 RX ADMIN — ASPIRIN 81 MG: 81 TABLET, COATED ORAL at 20:08

## 2017-02-28 RX ADMIN — ACETAMINOPHEN 650 MG: 325 TABLET, FILM COATED ORAL at 21:47

## 2017-02-28 RX ADMIN — GABAPENTIN 1200 MG: 300 CAPSULE ORAL at 21:33

## 2017-02-28 RX ADMIN — Medication 1 CAPSULE: at 20:08

## 2017-02-28 RX ADMIN — OXYCODONE AND ACETAMINOPHEN 1 TABLET: 5; 325 TABLET ORAL at 19:35

## 2017-02-28 RX ADMIN — INSULIN LISPRO 10 UNITS: 100 INJECTION, SOLUTION INTRAVENOUS; SUBCUTANEOUS at 20:08

## 2017-02-28 RX ADMIN — ATORVASTATIN CALCIUM 40 MG: 40 TABLET, FILM COATED ORAL at 20:08

## 2017-02-28 RX ADMIN — DAPTOMYCIN 450 MG: 500 INJECTION, POWDER, LYOPHILIZED, FOR SOLUTION INTRAVENOUS at 20:10

## 2017-02-28 RX ADMIN — INSULIN DETEMIR 30 UNITS: 100 INJECTION, SOLUTION SUBCUTANEOUS at 21:33

## 2017-03-01 ENCOUNTER — APPOINTMENT (OUTPATIENT)
Dept: CARDIOLOGY | Facility: HOSPITAL | Age: 47
End: 2017-03-01
Attending: FAMILY MEDICINE

## 2017-03-01 DIAGNOSIS — M86.9 OSTEOMYELITIS OF LEFT FOOT, UNSPECIFIED CHRONICITY: Primary | ICD-10-CM

## 2017-03-01 LAB
ANION GAP SERPL CALCULATED.3IONS-SCNC: 9 MMOL/L (ref 3–11)
BACTERIA BLD CULT: ABNORMAL
BH CV ECHO MEAS - AO MAX PG (FULL): 12 MMHG
BH CV ECHO MEAS - AO MAX PG: 17.8 MMHG
BH CV ECHO MEAS - AO MEAN PG (FULL): 8 MMHG
BH CV ECHO MEAS - AO MEAN PG: 11 MMHG
BH CV ECHO MEAS - AO ROOT AREA (BSA CORRECTED): 1.4
BH CV ECHO MEAS - AO ROOT AREA: 9.1 CM^2
BH CV ECHO MEAS - AO ROOT DIAM: 3.4 CM
BH CV ECHO MEAS - AO V2 MAX: 211 CM/SEC
BH CV ECHO MEAS - AO V2 MEAN: 155 CM/SEC
BH CV ECHO MEAS - AO V2 VTI: 30.3 CM
BH CV ECHO MEAS - AVA(I,A): 1.7 CM^2
BH CV ECHO MEAS - AVA(V,A): 1.8 CM^2
BH CV ECHO MEAS - AVA(V,D): 1.8 CM^2
BH CV ECHO MEAS - BSA(HAYCOCK): 2.4 M^2
BH CV ECHO MEAS - BSA: 2.3 M^2
BH CV ECHO MEAS - BZI_BMI: 29.2 KILOGRAMS/M^2
BH CV ECHO MEAS - BZI_METRIC_HEIGHT: 190.5 CM
BH CV ECHO MEAS - BZI_METRIC_WEIGHT: 106.1 KG
BH CV ECHO MEAS - CONTRAST EF (2CH): 56.8 ML/M^2
BH CV ECHO MEAS - CONTRAST EF 4CH: 56.4 ML/M^2
BH CV ECHO MEAS - EDV(CUBED): 69.9 ML
BH CV ECHO MEAS - EDV(MOD-SP2): 146 ML
BH CV ECHO MEAS - EDV(MOD-SP4): 140 ML
BH CV ECHO MEAS - EDV(TEICH): 75.1 ML
BH CV ECHO MEAS - EF(CUBED): 86.2 %
BH CV ECHO MEAS - EF(MOD-SP2): 56.8 %
BH CV ECHO MEAS - EF(TEICH): 80.1 %
BH CV ECHO MEAS - ESV(CUBED): 9.7 ML
BH CV ECHO MEAS - ESV(MOD-SP2): 63 ML
BH CV ECHO MEAS - ESV(MOD-SP4): 61 ML
BH CV ECHO MEAS - ESV(TEICH): 14.9 ML
BH CV ECHO MEAS - FS: 48.3 %
BH CV ECHO MEAS - IVS/LVPW: 0.88
BH CV ECHO MEAS - IVSD: 1.2 CM
BH CV ECHO MEAS - LA DIMENSION: 3.9 CM
BH CV ECHO MEAS - LA/AO: 1.1
BH CV ECHO MEAS - LAT PEAK E' VEL: 12.5 CM/SEC
BH CV ECHO MEAS - LV DIASTOLIC VOL/BSA (35-75): 59.7 ML/M^2
BH CV ECHO MEAS - LV MASS(C)D: 200.6 GRAMS
BH CV ECHO MEAS - LV MASS(C)DI: 85.5 GRAMS/M^2
BH CV ECHO MEAS - LV MAX PG: 5.9 MMHG
BH CV ECHO MEAS - LV MEAN PG: 3 MMHG
BH CV ECHO MEAS - LV SYSTOLIC VOL/BSA (12-30): 26 ML/M^2
BH CV ECHO MEAS - LV V1 MAX: 121 CM/SEC
BH CV ECHO MEAS - LV V1 MEAN: 76.1 CM/SEC
BH CV ECHO MEAS - LV V1 VTI: 16.7 CM
BH CV ECHO MEAS - LVIDD: 4.1 CM
BH CV ECHO MEAS - LVIDS: 2.1 CM
BH CV ECHO MEAS - LVLD AP2: 9.8 CM
BH CV ECHO MEAS - LVLD AP4: 10 CM
BH CV ECHO MEAS - LVLS AP2: 8.8 CM
BH CV ECHO MEAS - LVLS AP4: 8.8 CM
BH CV ECHO MEAS - LVOT AREA (M): 3.1 CM^2
BH CV ECHO MEAS - LVOT AREA: 3.1 CM^2
BH CV ECHO MEAS - LVOT DIAM: 2 CM
BH CV ECHO MEAS - LVPWD: 1.4 CM
BH CV ECHO MEAS - MED PEAK E' VEL: 9.9 CM/SEC
BH CV ECHO MEAS - MV A MAX VEL: 117 CM/SEC
BH CV ECHO MEAS - MV E MAX VEL: 110 CM/SEC
BH CV ECHO MEAS - MV E/A: 0.94
BH CV ECHO MEAS - PA ACC SLOPE: 1283 CM/SEC^2
BH CV ECHO MEAS - PA ACC TIME: 0.07 SEC
BH CV ECHO MEAS - PA MAX PG (FULL): 4.2 MMHG
BH CV ECHO MEAS - PA MAX PG: 6.1 MMHG
BH CV ECHO MEAS - PA PR(ACCEL): 45.7 MMHG
BH CV ECHO MEAS - PA V2 MAX: 123 CM/SEC
BH CV ECHO MEAS - RV MAX PG: 1.9 MMHG
BH CV ECHO MEAS - RV V1 MAX: 69.1 CM/SEC
BH CV ECHO MEAS - SI(AO): 117.3 ML/M^2
BH CV ECHO MEAS - SI(CUBED): 25.7 ML/M^2
BH CV ECHO MEAS - SI(LVOT): 22.4 ML/M^2
BH CV ECHO MEAS - SI(MOD-SP2): 35.4 ML/M^2
BH CV ECHO MEAS - SI(MOD-SP4): 33.7 ML/M^2
BH CV ECHO MEAS - SI(TEICH): 25.6 ML/M^2
BH CV ECHO MEAS - SV(AO): 275.1 ML
BH CV ECHO MEAS - SV(CUBED): 60.3 ML
BH CV ECHO MEAS - SV(LVOT): 52.5 ML
BH CV ECHO MEAS - SV(MOD-SP2): 83 ML
BH CV ECHO MEAS - SV(MOD-SP4): 79 ML
BH CV ECHO MEAS - SV(TEICH): 60.2 ML
BH CV ECHO MEAS - TAPSE (>1.6): 2.1 CM2
BH CV ECHO MEAS - TR MAX VEL: 205 CM/SEC
BH CV XLRA - RV BASE: 4 CM
BH CV XLRA - RV LENGTH: 7.9 CM
BH CV XLRA - RV MID: 3.4 CM
BH CV XLRA - TDI S': 15 CM/SEC
BUN BLD-MCNC: 19 MG/DL (ref 9–23)
BUN/CREAT SERPL: 31.7 (ref 7–25)
CALCIUM SPEC-SCNC: 9.2 MG/DL (ref 8.7–10.4)
CHLORIDE SERPL-SCNC: 98 MMOL/L (ref 99–109)
CO2 SERPL-SCNC: 25 MMOL/L (ref 20–31)
CREAT BLD-MCNC: 0.6 MG/DL (ref 0.6–1.3)
DEPRECATED RDW RBC AUTO: 42.3 FL (ref 37–54)
E/E' RATIO: 9.9
ERYTHROCYTE [DISTWIDTH] IN BLOOD BY AUTOMATED COUNT: 14.1 % (ref 11.3–14.5)
GFR SERPL CREATININE-BSD FRML MDRD: 144 ML/MIN/1.73
GLUCOSE BLD-MCNC: 152 MG/DL (ref 70–100)
GLUCOSE BLDC GLUCOMTR-MCNC: 118 MG/DL (ref 70–130)
GLUCOSE BLDC GLUCOMTR-MCNC: 150 MG/DL (ref 70–130)
GLUCOSE BLDC GLUCOMTR-MCNC: 155 MG/DL (ref 70–130)
GLUCOSE BLDC GLUCOMTR-MCNC: 180 MG/DL (ref 70–130)
GLUCOSE BLDC GLUCOMTR-MCNC: 97 MG/DL (ref 70–130)
HCT VFR BLD AUTO: 35 % (ref 38.9–50.9)
HGB BLD-MCNC: 11.3 G/DL (ref 13.1–17.5)
LEFT ATRIUM VOLUME INDEX: 19.1 ML/M2
LEFT ATRIUM VOLUME: 45 CM3
LV EF 2D ECHO EST: 65 %
MCH RBC QN AUTO: 26.5 PG (ref 27–31)
MCHC RBC AUTO-ENTMCNC: 32.3 G/DL (ref 32–36)
MCV RBC AUTO: 82.2 FL (ref 80–99)
PLATELET # BLD AUTO: 362 10*3/MM3 (ref 150–450)
PMV BLD AUTO: 10.7 FL (ref 6–12)
POTASSIUM BLD-SCNC: 3.8 MMOL/L (ref 3.5–5.5)
RBC # BLD AUTO: 4.26 10*6/MM3 (ref 4.2–5.76)
SODIUM BLD-SCNC: 132 MMOL/L (ref 132–146)
WBC NRBC COR # BLD: 37.74 10*3/MM3 (ref 3.5–10.8)

## 2017-03-01 PROCEDURE — C8929 TTE W OR WO FOL WCON,DOPPLER: HCPCS

## 2017-03-01 PROCEDURE — 25010000002 VANCOMYCIN 1750 MG/500ML SOLUTION

## 2017-03-01 PROCEDURE — 85027 COMPLETE CBC AUTOMATED: CPT | Performed by: FAMILY MEDICINE

## 2017-03-01 PROCEDURE — 80048 BASIC METABOLIC PNL TOTAL CA: CPT | Performed by: FAMILY MEDICINE

## 2017-03-01 PROCEDURE — 93306 TTE W/DOPPLER COMPLETE: CPT | Performed by: INTERNAL MEDICINE

## 2017-03-01 PROCEDURE — 25010000002 SULFUR HEXAFLUORIDE MICROSPH 60.7-25 MG RECONSTITUTED SUSPENSION: Performed by: FAMILY MEDICINE

## 2017-03-01 PROCEDURE — 25010000002 ONDANSETRON PER 1 MG: Performed by: FAMILY MEDICINE

## 2017-03-01 PROCEDURE — 25010000002 VANCOMYCIN

## 2017-03-01 PROCEDURE — 82962 GLUCOSE BLOOD TEST: CPT

## 2017-03-01 PROCEDURE — 25010000002 ENOXAPARIN PER 10 MG: Performed by: FAMILY MEDICINE

## 2017-03-01 PROCEDURE — 63710000001 INSULIN DETEMIR PER 5 UNITS: Performed by: PHYSICIAN ASSISTANT

## 2017-03-01 PROCEDURE — 25010000002 PIPERACILLIN-TAZOBACTAM: Performed by: INTERNAL MEDICINE

## 2017-03-01 PROCEDURE — 99233 SBSQ HOSP IP/OBS HIGH 50: CPT | Performed by: FAMILY MEDICINE

## 2017-03-01 RX ORDER — IBUPROFEN 600 MG/1
600 TABLET ORAL ONCE
Status: COMPLETED | OUTPATIENT
Start: 2017-03-01 | End: 2017-03-01

## 2017-03-01 RX ORDER — VANCOMYCIN/0.9 % SOD CHLORIDE 1.75 G/5
1750 PLASTIC BAG, INJECTION (ML) INTRAVENOUS EVERY 12 HOURS
Status: DISCONTINUED | OUTPATIENT
Start: 2017-03-01 | End: 2017-03-03

## 2017-03-01 RX ORDER — ACETAMINOPHEN 325 MG/1
650 TABLET ORAL ONCE
Status: COMPLETED | OUTPATIENT
Start: 2017-03-01 | End: 2017-03-01

## 2017-03-01 RX ADMIN — LOSARTAN POTASSIUM 100 MG: 50 TABLET, FILM COATED ORAL at 09:18

## 2017-03-01 RX ADMIN — ACETAMINOPHEN 650 MG: 325 TABLET, FILM COATED ORAL at 16:35

## 2017-03-01 RX ADMIN — INSULIN LISPRO 2 UNITS: 100 INJECTION, SOLUTION INTRAVENOUS; SUBCUTANEOUS at 09:17

## 2017-03-01 RX ADMIN — ENOXAPARIN SODIUM 40 MG: 40 INJECTION SUBCUTANEOUS at 05:35

## 2017-03-01 RX ADMIN — SODIUM CHLORIDE 1000 ML: 9 INJECTION, SOLUTION INTRAVENOUS at 23:29

## 2017-03-01 RX ADMIN — VANCOMYCIN HYDROCHLORIDE 2000 MG: 1 INJECTION, POWDER, LYOPHILIZED, FOR SOLUTION INTRAVENOUS at 09:15

## 2017-03-01 RX ADMIN — GABAPENTIN 1200 MG: 300 CAPSULE ORAL at 22:16

## 2017-03-01 RX ADMIN — SODIUM CHLORIDE 100 ML/HR: 900 INJECTION INTRAVENOUS at 05:37

## 2017-03-01 RX ADMIN — TAZOBACTAM SODIUM AND PIPERACILLIN SODIUM 4.5 G: .5; 4 INJECTION, POWDER, LYOPHILIZED, FOR SOLUTION INTRAVENOUS at 17:21

## 2017-03-01 RX ADMIN — Medication 1750 MG: at 22:16

## 2017-03-01 RX ADMIN — GABAPENTIN 1200 MG: 300 CAPSULE ORAL at 05:35

## 2017-03-01 RX ADMIN — ONDANSETRON 4 MG: 2 INJECTION INTRAMUSCULAR; INTRAVENOUS at 09:44

## 2017-03-01 RX ADMIN — GABAPENTIN 1200 MG: 300 CAPSULE ORAL at 13:22

## 2017-03-01 RX ADMIN — TAZOBACTAM SODIUM AND PIPERACILLIN SODIUM 4.5 G: .5; 4 INJECTION, POWDER, LYOPHILIZED, FOR SOLUTION INTRAVENOUS at 12:39

## 2017-03-01 RX ADMIN — Medication 1 CAPSULE: at 09:17

## 2017-03-01 RX ADMIN — SULFUR HEXAFLUORIDE 4 ML: KIT at 12:34

## 2017-03-01 RX ADMIN — INSULIN LISPRO 10 UNITS: 100 INJECTION, SOLUTION INTRAVENOUS; SUBCUTANEOUS at 09:17

## 2017-03-01 RX ADMIN — TAZOBACTAM SODIUM AND PIPERACILLIN SODIUM 4.5 G: .5; 4 INJECTION, POWDER, LYOPHILIZED, FOR SOLUTION INTRAVENOUS at 21:19

## 2017-03-01 RX ADMIN — ACETAMINOPHEN 650 MG: 325 TABLET, FILM COATED ORAL at 21:19

## 2017-03-01 RX ADMIN — FAMOTIDINE 20 MG: 20 TABLET ORAL at 17:21

## 2017-03-01 RX ADMIN — INSULIN LISPRO 2 UNITS: 100 INJECTION, SOLUTION INTRAVENOUS; SUBCUTANEOUS at 22:17

## 2017-03-01 RX ADMIN — INSULIN DETEMIR 30 UNITS: 100 INJECTION, SOLUTION SUBCUTANEOUS at 22:17

## 2017-03-01 RX ADMIN — FAMOTIDINE 20 MG: 20 TABLET ORAL at 09:17

## 2017-03-01 RX ADMIN — ACETAMINOPHEN 650 MG: 325 TABLET, FILM COATED ORAL at 13:36

## 2017-03-01 RX ADMIN — INSULIN LISPRO 10 UNITS: 100 INJECTION, SOLUTION INTRAVENOUS; SUBCUTANEOUS at 13:22

## 2017-03-01 RX ADMIN — ASPIRIN 81 MG: 81 TABLET, COATED ORAL at 09:17

## 2017-03-01 RX ADMIN — IBUPROFEN 600 MG: 600 TABLET ORAL at 17:55

## 2017-03-01 NOTE — PROGRESS NOTES
Cardiothoracic Surgery Progress Note         LOS: 1 day      Subjective:  Patient asleep this morning.  Minimal conversation on patient's end    Objective:  Vital Signs  Temp:  [98 °F (36.7 °C)-102.9 °F (39.4 °C)] 98.1 °F (36.7 °C)  Heart Rate:  [] 96  Resp:  [16-18] 18  BP: (107-178)/() 118/68    Physical Exam:   General Appearance: Sleeping but arouses to noise, appears stated age and cooperative   Lungs: clear to auscultation, respirations regular, respirations even and respirations unlabored   Heart: regular rhythm & normal rate, normal S1, S2 and no murmur, no clau, no rub   Skin: Left foot with dry eschar to distal aspect of wound.  Purulent drainage from proximal aspect of wound.       Results:    Results from last 7 days  Lab Units 02/28/17  1553   WBC 10*3/mm3 18.47*   HEMOGLOBIN g/dL 12.8*   HEMATOCRIT % 38.2*   PLATELETS 10*3/mm3 499*       Results from last 7 days  Lab Units 02/28/17  1553   SODIUM mmol/L 129*   POTASSIUM mmol/L 4.3   CHLORIDE mmol/L 92*   TOTAL CO2 mmol/L 26.0   BUN mg/dL 19   CREATININE mg/dL 0.80   GLUCOSE mg/dL 255*   CALCIUM mg/dL 9.8         Assessment:  Uncontrolled diabetes and left foot ulcer status post 3-5 TMA.  Non-compliant with ABX and wound care after amputation    Plan:  Difficult problem with patient non-compliance and obvious psychiatric issues  In a compliant patient, this wound would heal over time  Patient is looking at [facility]  Continue local wound care    Juancho Martinez MD  03/01/17  6:52 AM

## 2017-03-01 NOTE — PAYOR COMM NOTE
"Elliot Crystal (46 y.o. Male)     INITIAL NOTIFICATION AND CLINICALS      Date of Birth Social Security Number Address Home Phone MRN    1970  [address] [place] [phone] [id number]    Taoism Marital Status          Judaism        Admission Date Admission Type Admitting Provider Attending Provider Department, Room/Bed    2/28/17 Emergency Melisa Boles MD Hall, Holly, MD [address], S456/1    Discharge Date Discharge Disposition Discharge Destination                      Attending Provider: Melisa Boles MD     Allergies:  No Known Drug Allergy    Isolation:  None   Infection:  None   Code Status:  FULL    Ht:  75\" (190.5 cm)   Wt:  234 lb 6.4 oz (106 kg)    Admission Cmt:  None   Principal Problem:  Sepsis [A41.9]                 Active Insurance as of 2/28/2017     Primary Coverage     Payor Plan Insurance Group Employer/Plan Group    HU[employer]NA MEDICAID HU[employer] [id number]     Payor Plan Address Payor Plan Phone Number Effective From Effective To    PO  [phone] 1/18/2017     [place] [address]       Subscriber Name Subscriber Birth Date Member ID       ELLIOT CRYSTAL 1970 [id number]                 Emergency Contacts      (Rel.) Home Phone Work Phone Mobile Phone    Cindy Crystal (Spouse) [phone] -- [phone]            Insurance Information                [employer]NA MEDICAID/[employer]NA [employer] Phone: [phone]    Subscriber: Elliot Crystal Subscriber#: [id number]    Group#: [id number] Precert#:              History & Physical      Melisa Boles MD at 2/28/2017  6:50 PM              [facility] Medicine Services  HISTORY AND PHYSICAL    Primary Care Physician: Juan Rios MD    Subjective     Chief Complaint:    Left foot wound with purulence and pain    History of Present Illness:     Noncompliant 45yo with uncontrolled DM2 and related neuropathy and diabetic left foot ulcers s/p recent 3rd-5th " "left metatarsal amputations by Dr. Martinez 1/30/17 and was d/c'd at that time to [facility] for wounc care and IV abx.  He has since been home and followed for outpt IV abx by Dr. Garcia. He was started on wound vac during a subsequent hospitalization related to poor wound healing 2/13-2/17/17 and had a follow up appointment yesterday with Dr. Dong, who told him that there was continued foot deterioration and \"wound vac was not working\" (pt has had it off since yesterday since \"there is no point in it\"). He appears to have some element of self neglect and disinterest in the severity of his predicament, possibly underlying depression.  He has been admittedly noncompliant with his outpt infusions of IV Rocephin and Dapto.  Pt states has felt \"sick\" past 2-3 days with fever to 101.6 by home thermometer, malaise, worsened left foot pain and foul smelling drainage from wound, associated nausea and subjective fever/chills.       Review of Systems   Constitutional: Positive for appetite change, chills, fatigue and fever.   Respiratory: Negative for shortness of breath.    Cardiovascular: Negative for chest pain.        LLE distal edema    Skin:        Foul smelling purulence from left foot wound with surrounding warm erythema   Psychiatric/Behavioral:        Self neglect, depressed mood      Otherwise complete ROS performed and negative except as mentioned in the HPI.    Past Medical History:   Past Medical History   Diagnosis Date   • Arthritis    • Cirrhosis    • Counseling for insulin pump    • Diabetes mellitus    • Encephalopathy, hepatic    • H/O degenerative disc disease    • History of Lissa's gangrene    • Hyperlipemia    • Hypertension    • multiple Skin abscesses    • DUNLAP, bx showed stage IV fibrosis    • Neuropathy        Past Surgical History:  Past Surgical History   Procedure Laterality Date   • Testicle surgery       DEBRIDEMENT FROM GANGRENE   • Leg surgery     • Amputation digit Left 1/30/2017     " Procedure: left fourth and fifth transmetatarsal toe amputation ;  Surgeon: Juancho Martinez MD;  Location: [facility];  Service:        Family History: family history includes Arthritis in his father and mother; Cancer in his mother; Diabetes in his brother and father; Heart attack in his father; Hyperlipidemia in his father; Hypertension in his brother, father, and mother; Kidney disease in his mother; Mental illness in his sister; Obesity in his brother; Stroke in his mother.    Social History:  reports that he has never smoked. He has never used smokeless tobacco. He reports that he does not drink alcohol or use illicit drugs.    Medications:  Prescriptions Prior to Admission   Medication Sig Dispense Refill Last Dose   • aspirin 81 MG EC tablet Take 1 tablet by mouth Daily.   Past Week at Unknown time   • atorvastatin (LIPITOR) 40 MG tablet Take 1 tablet by mouth Every Night.   Past Week at Unknown time   • gabapentin (NEURONTIN) 400 MG capsule Take 3 capsules by mouth 3 (Three) Times a Day.   2/28/2017 at Unknown time   • insulin detemir (LEVEMIR) 100 UNIT/ML injection Inject 30 Units under the skin Every 12 (Twelve) Hours.   Past Week at Unknown time   • insulin lispro (humaLOG) 100 UNIT/ML injection Inject 10 Units under the skin 3 (Three) Times a Day With Meals.   Past Week at Unknown time   • insulin lispro (humaLOG) 100 UNIT/ML injection Inject 2-7 Units under the skin 4 (Four) Times a Day With Meals & at Bedtime.   Past Week at Unknown time   • melatonin 5 MG sublingual tablet sublingual tablet Place 1 tablet under the tongue At Night As Needed (insomnia).  0 Past Week at Unknown time   • probiotic (CULTURELLE) capsule capsule Take 1 capsule by mouth Daily. 30 capsule  Past Week at Unknown time   • cefTRIAXone (ROCEPHIN) 40 MG/ML IVPB Infuse 50 mL into a venous catheter Daily. Indications: Bone and Joint Infection   Taking   • DAPTOmycin 750 mg in sodium chloride 0.9 % 50 mL Infuse 750 mg into a venous  "catheter Daily. Indications: Bone and Joint Infection   Taking   • docusate sodium 100 MG capsule Take 100 mg by mouth 2 (Two) Times a Day.   Taking   • famotidine (PEPCID) 20 MG tablet Take 1 tablet by mouth 2 (Two) Times a Day.   Taking   • losartan (COZAAR) 100 MG tablet Take 1 tablet by mouth Daily. 30 tablet 0 Taking   • polyethylene glycol (MIRALAX) packet Take 17 g by mouth Daily.   Not Taking   • povidone-iodine (BETADINE) 10 % ointment Apply  topically Daily. To bilateral feet ulcerations  0 Taking   • sodium chloride 0.9 % solution    Taking       Allergies:  Allergies   Allergen Reactions   • No Known Drug Allergy          Objective     Physical Exam:  Vital Signs:   Visit Vitals   • /93   • Pulse 101   • Temp 98.5 °F (36.9 °C) (Oral)   • Resp 18   • Ht 75\" (190.5 cm)   • Wt 235 lb (107 kg)   • SpO2 90%   • BMI 29.37 kg/m2     Physical Exam  Temp:  [98.5 °F (36.9 °C)-99.6 °F (37.6 °C)] 99.6 °F (37.6 °C)  Heart Rate:  [] 101  Resp:  [16-18] 18  BP: (132-178)/() 178/101  Constitutional: no acute distress, awake, alert  Eyes: PERRLA, sclerae anicteric, no conjunctival injection  Neck: supple, no thyromegaly, trachea midline  Respiratory: Clear to auscultation bilaterally, nonlabored respirations   Cardiovascular: RRR, 2-3/6 crescendo murmur heard best at left lower sternal border, rubs, or gallops, palpable pedal pulses bilaterally  Gastrointestinal: Positive bowel sounds, soft, nontender, nondistended  Musculoskeletal: LLE 2+ pitting to distal tibia, no clubbing or cyanosis to bilateral lower extremities  Psychiatric: oriented x 3, depressed affect, cooperative  Neurologic: Strength symmetric in all extremities, Cranial Nerves grossly intact to confrontation   Derm: Dry margins of left third through fifth metatarsal wound with boggy foul-smelling purulence draining from central aspect of wound.  Wound has 2 cm circumferential confluent warm erythema which also extends laterally along the " foot to the anterior portion of lateral malleolus          Results Reviewed:    Results from last 7 days  Lab Units 02/28/17  1553   WBC 10*3/mm3 18.47*   HEMOGLOBIN g/dL 12.8*   PLATELETS 10*3/mm3 499*       Results from last 7 days  Lab Units 02/28/17  1553   SODIUM mmol/L 129*   POTASSIUM mmol/L 4.3   TOTAL CO2 mmol/L 26.0   CREATININE mg/dL 0.80   GLUCOSE mg/dL 255*   CALCIUM mg/dL 9.8       I have personally reviewed and interpreted available lab data, radiology studies and ECG obtained at time of admission.     Assessment / Plan     Assessment/Problem List:   Principal Problem:    Sepsis  Active Problems:    DUNLAP Cirrhosis    Essential hypertension    Non-compliance    Hyperlipemia    Poorly controlled type 2 diabetes mellitus with peripheral neuropathy    Osteomyelitis of left foot    Diabetic foot infection    Diabetic neuropathy      Noncompliant 45yo with uncontrolled DM2 and related neuropathy and diabetic left foot ulcers s/p recent 3rd-5th left metatarsal amputations by Dr. Martinez 1/30/17 and followed for outpt IV abx by Dr. Garcia.  He was recently readmitted 2/13-2/17/17 for consideration of further amputation but instead decision was made to cont aggressive abx and a wound vac was placed as well at discharge. Today he presents with noncompliance with his outpt IV abx and worsened left foot wounds, fevers at home, with foul smelling copious purulence, elevated inflammatory markers, leukocytosis and possibly and new heart murmur:      Plan:    - pt known to Bam Dong and Michelle from recent left metatarsal amputations on 1/30/17, consults placed. Suspect will now likely need BKA.   - reinitiate previous outpt IV Dapto and Rocephin abx orders (pt has been noncompliant with outpt IV abx) per Dr. Dong's prior plan  - ??new murmur (cursory review of recent notes does not mention murmur on exams but family says he has been told of murmur before so hx is unclear). Given concern of active OM will order  ECHO for review of valvular structures to r/o endocartitis.  BCx's pending as well.   - basal/bolus aggressive FSBS control (last A1c=14.9% on 1/23/17, will recheck since home insulins were aggressively adjusted at last d/c from hospital)      DVT prophylaxis:  Lovenox  Code Status:  FULL  Admission Status: Patient will be admitted to INPATIENT status due to the need for care which can only be reasonably provided in an hospital setting such as aggressive/expedited ancillary services and/or consultation services and the necessity for IV abx medications, close physician monitoring and/or the possible need for further amputation operative procedures.  In such, I feel patient’s risk for adverse outcomes and need for care warrant INPATIENT evaluation and predict the patient’s care encounter to likely last beyond 2 midnights.       Melisa Boles MD 02/28/17 6:51 PM           Electronically signed by Melisa Boles MD at 2/28/2017  8:35 PM        Emergency Department Notes     No notes of this type exist for this encounter.        Hospital Medications (active)       Dose Frequency Start End    acetaminophen (TYLENOL) tablet 650 mg 650 mg Every 4 Hours PRN 2/28/2017     Sig - Route: Take 2 tablets by mouth Every 4 (Four) Hours As Needed for mild pain (1-3). - Oral    aspirin EC tablet 81 mg 81 mg Daily 2/28/2017     Sig - Route: Take 1 tablet by mouth Daily. - Oral    atorvastatin (LIPITOR) tablet 40 mg 40 mg Nightly 2/28/2017     Sig - Route: Take 1 tablet by mouth Every Night. - Oral    cefTRIAXone (ROCEPHIN) IVPB 1 g 1 g Once 2/28/2017 2/28/2017    Sig - Route: Infuse 50 mL into a venous catheter 1 (One) Time. - Intravenous    cefTRIAXone (ROCEPHIN) IVPB 2 g 2 g Daily 3/1/2017     Sig - Route: Infuse 50 mL into a venous catheter Daily. - Intravenous    DAPTOmycin (CUBICIN) 450 mg in sodium chloride 0.9 % 50 mL IVPB 450 mg Every 24 Hours 2/28/2017 2/28/2017    Sig - Route: Infuse 450 mg into a venous catheter Daily. -  Intravenous    DAPTOmycin (CUBICIN) 750 mg in sodium chloride 0.9 % 50 mL IVPB 750 mg Every 24 Hours 3/1/2017     Sig - Route: Infuse 750 mg into a venous catheter Daily. - Intravenous    dextrose (D50W) solution 25 g 25 g Every 15 Minutes PRN 2/28/2017     Sig - Route: Infuse 50 mL into a venous catheter Every 15 (Fifteen) Minutes As Needed for low blood sugar (Blood Sugar Less Than 70, Patient Has IV Access - Unresponsive, NPO or Unable To Safely Swallow). - Intravenous    dextrose (GLUTOSE) oral gel 15 g 15 g Every 15 Minutes PRN 2/28/2017     Sig - Route: Take 15 g by mouth Every 15 (Fifteen) Minutes As Needed for low blood sugar (Blood Sugar Less Than 70, Patient Alert, Is Not NPO & Can Safely Swallow). - Oral    docusate sodium (COLACE) capsule 100 mg 100 mg 2 Times Daily 2/28/2017     Sig - Route: Take 1 capsule by mouth 2 (Two) Times a Day. - Oral    enoxaparin (LOVENOX) syringe 40 mg 40 mg Every Early Morning 3/1/2017     Sig - Route: Inject 0.4 mL under the skin Every Morning. - Subcutaneous    famotidine (PEPCID) tablet 20 mg 20 mg 2 Times Daily 2/28/2017     Sig - Route: Take 1 tablet by mouth 2 (Two) Times a Day. - Oral    gabapentin (NEURONTIN) capsule 1,200 mg 1,200 mg Every 8 Hours Scheduled 2/28/2017     Sig - Route: Take 4 capsules by mouth Every 8 (Eight) Hours. - Oral    glucagon (GLUCAGEN) injection 1 mg 1 mg Every 15 Minutes PRN 2/28/2017     Sig - Route: Inject 1 mg under the skin Every 15 (Fifteen) Minutes As Needed (Blood Glucose Less Than 70 - Patient Without IV Access - Unresponsive, NPO or Unable To Safely Swallow). - Subcutaneous    insulin detemir (LEVEMIR) injection 30 Units 30 Units Every 12 Hours Scheduled 2/28/2017     Sig - Route: Inject 30 Units under the skin Every 12 (Twelve) Hours. - Subcutaneous    insulin lispro (humaLOG) injection 0-9 Units 0-9 Units 4 Times Daily With Meals & Nightly 2/28/2017     Sig - Route: Inject 0-9 Units under the skin 4 (Four) Times a Day With Meals  "& at Bedtime. - Subcutaneous    insulin lispro (humaLOG) injection 10 Units 10 Units 3 Times Daily With Meals 2/28/2017     Sig - Route: Inject 10 Units under the skin 3 (Three) Times a Day With Meals. - Subcutaneous    losartan (COZAAR) tablet 100 mg 100 mg Every 24 Hours Scheduled 2/28/2017     Sig - Route: Take 2 tablets by mouth Daily. - Oral    Morphine sulfate (PF) injection 1 mg 1 mg Every 4 Hours PRN 2/28/2017 3/10/2017    Sig - Route: Infuse 0.5 mL into a venous catheter Every 4 (Four) Hours As Needed for moderate pain (4-6). - Intravenous    Linked Group 1:  \"And\" Linked Group Details        naloxone (NARCAN) injection 0.4 mg 0.4 mg Every 5 Minutes PRN 2/28/2017     Sig - Route: Infuse 1 mL into a venous catheter Every 5 (Five) Minutes As Needed for respiratory depression. - Intravenous    Linked Group 1:  \"And\" Linked Group Details        ondansetron (ZOFRAN) injection 4 mg 4 mg Every 6 Hours PRN 2/28/2017     Sig - Route: Infuse 2 mL into a venous catheter Every 6 (Six) Hours As Needed for nausea or vomiting. - Intravenous    oxyCODONE-acetaminophen (PERCOCET) 5-325 MG per tablet 1 tablet 1 tablet Every 4 Hours PRN 2/28/2017 3/10/2017    Sig - Route: Take 1 tablet by mouth Every 4 (Four) Hours As Needed for moderate pain (4-6). - Oral    polyethylene glycol (MIRALAX) powder 17 g 17 g Daily 2/28/2017     Sig - Route: Take 17 g by mouth Daily. - Oral    probiotic (CULTURELLE) capsule 1 capsule 1 capsule Daily 2/28/2017     Sig - Route: Take 1 capsule by mouth Daily. - Oral    sodium chloride 0.9 % flush 1-10 mL 1-10 mL As Needed 2/28/2017     Sig - Route: Infuse 1-10 mL into a venous catheter As Needed for line care. - Intravenous    sodium chloride 0.9 % flush 10 mL 10 mL As Needed 2/28/2017     Sig - Route: Infuse 10 mL into a venous catheter As Needed for line care. - Intravenous    Cosign for Ordering: Required by Carlos Brothers MD    sodium chloride 0.9 % infusion 100 mL/hr Continuous 2/28/2017  "    Sig - Route: Infuse 100 mL/hr into a venous catheter Continuous. - Intravenous            Lab Results (last 24 hours)     Procedure Component Value Units Date/Time    Basic Metabolic Panel [[id number]]  (Abnormal) Collected:  02/28/17 1553    Specimen:  Blood Updated:  02/28/17 1708     Glucose 255 (H) mg/dL      BUN 19 mg/dL      Creatinine 0.80 mg/dL      Sodium 129 (L) mmol/L      Potassium 4.3 mmol/L      Chloride 92 (L) mmol/L      CO2 26.0 mmol/L      Calcium 9.8 mg/dL      eGFR Non African Amer 103 mL/min/1.73      BUN/Creatinine Ratio 23.8      Anion Gap 11.0 mmol/L     Narrative:       National Kidney Foundation Guidelines    Stage                           Description                             GFR                      1                               Normal or High                          90+  2                               Mild decrease                            60-89  3                               Moderate decrease                   30-59  4                               Severe decrease                       15-29  5                               Kidney failure                             <15    C-reactive Protein [[id number]]  (Abnormal) Collected:  02/28/17 1553    Specimen:  Blood Updated:  02/28/17 1711     C-Reactive Protein 135.10 (H) mg/dL     Procalcitonin [[id number]] Collected:  02/28/17 1553    Specimen:  Blood Updated:  02/28/17 1730     Procalcitonin 0.19 ng/mL     Narrative:       As a Marker for Sepsis (Non-Neonates):   1. <0.5 ng/mL represents a low risk of severe sepsis and/or septic shock.  2. >2 ng/mL represents a high risk of severe sepsis and/or septic shock.    As a Marker for Lower Respiratory Tract Infections that require antibiotic therapy:    PCT on Admission     Antibiotic Therapy       6-12 Hrs later  > 0.5                Strongly Recommended             >0.25 - <0.5         Recommended  0.1 - 0.25           Discouraged              Remeasure/reassess PCT  <0.1                  Strongly Discouraged     Remeasure/reassess PCT                     PCT values of < 0.5 ng/mL do not exclude an infection, because localized infections (without systemic signs) may be associated with such low concentrations, or a systemic infection in its initial stages (< 6 hours). Furthermore, increased PCT can occur without infection. PCT concentrations between 0.5 and 2.0 ng/mL should be interpreted taking into account the patient's history. It is recommended to retest PCT within 6-24 hours if any concentrations < 2 ng/mL are obtained.    Sedimentation Rate [[id number]]  (Abnormal) Collected:  02/28/17 1553    Specimen:  Blood Updated:  02/28/17 1746     Sed Rate 127 (H) mm/hr     Lavender Top [[id number]] Collected:  02/28/17 1553    Specimen:  Blood Updated:  02/28/17 1751     Extra Tube       CBC & Differential [[id number]] Collected:  02/28/17 1553    Specimen:  Blood Updated:  02/28/17 1755    Narrative:       The following orders were created for panel order CBC & Differential.  Procedure                               Abnormality         Status                     ---------                               -----------         ------                     CBC Auto Differential[[id number]]         Abnormal            Final result                 Please view results for these tests on the individual orders.    CBC Auto Differential [[id number]]  (Abnormal) Collected:  02/28/17 1553    Specimen:  Blood Updated:  02/28/17 1755     WBC 18.47 (H) 10*3/mm3      RBC 4.65 10*6/mm3      Hemoglobin 12.8 (L) g/dL      Hematocrit 38.2 (L) %      MCV 82.2 fL      MCH 27.5 pg      MCHC 33.5 g/dL      RDW 13.9 %      RDW-SD 41.9 fl      MPV 10.6 fL      Platelets 499 (H) 10*3/mm3      Neutrophil % 81.4 (H) %      Lymphocyte % 7.1 (L) %      Monocyte % 10.9 %      Eosinophil % 0.0 %      Basophil % 0.1 %      Immature Grans % 0.5 %      Neutrophils, Absolute 15.03 (H) 10*3/mm3      Lymphocytes, Absolute 1.32 10*3/mm3      Monocytes,  Absolute 2.01 (H) 10*3/mm3      Eosinophils, Absolute 0.00 (L) 10*3/mm3      Basophils, Absolute 0.02 10*3/mm3      Immature Grans, Absolute 0.09 (H) 10*3/mm3     Lactic Acid, Plasma [[id number]]  (Normal) Collected:  02/28/17 1702    Specimen:  Blood Updated:  02/28/17 1838     Lactate 1.8 mmol/L       Falsely depressed results may occur on samples drawn from patients receiving N-Acetylcysteine (NAC) or Metamizole.       Protime-INR [[id number]]  (Abnormal) Collected:  02/28/17 1553    Specimen:  Blood Updated:  02/28/17 1937     Protime 13.9 (H) Seconds      INR 1.27     Narrative:       Therapeutic Ranges for INR: 2.0-3.0 (PT 20-30)                              2.5-3.5 (PT 25-34)    aPTT [[id number]]  (Normal) Collected:  02/28/17 1553    Specimen:  Blood Updated:  02/28/17 1937     PTT 31.0 seconds     Narrative:       PTT = The equivalent PTT values for the therapeutic range of heparin levels at 0.3 to 0.5 U/ml are 45 to 60 seconds.    Gold Top - SST [[id number]] Collected:  02/28/17 1553    Specimen:  Blood Updated:  02/28/17 2001     Extra Tube Hold for add-ons.       Auto resulted.       [facility] Draw [[id number]] Collected:  02/28/17 1553    Specimen:  Blood Updated:  02/28/17 2001    Narrative:       The following orders were created for panel order [facility] Draw.  Procedure                               Abnormality         Status                     ---------                               -----------         ------                     Light Blue Top[[id number]]                                    Final result               Green Top (Gel)[[id number]]                                   Final result               Lavender Top[[id number]]                                      Final result               Gold Top - SST[[id number]]                                    Final result               Green Top (No Gel)[[id number]]                                                             Please view results for these tests on the individual  orders.    Light Blue Top [[id number]] Collected:  02/28/17 1553    Specimen:  Blood Updated:  02/28/17 2001     Extra Tube hold for add-on       Auto resulted       Green Top (Gel) [[id number]] Collected:  02/28/17 1553    Specimen:  Blood Updated:  02/28/17 2001     Extra Tube Hold for add-ons.       Auto resulted.       Hemoglobin A1c [[id number]]  (Abnormal) Collected:  02/28/17 1553    Specimen:  Blood Updated:  02/28/17 2001     Hemoglobin A1C 9.70 (H) %     Narrative:       The American Diabetes Association recommends maintenance of Hemoglobin A1C at 7.0% or lower. Goals for Hemoglobin A1C reduction may need to be modified if hypoglycemia is a problem.    Blood Culture [[id number]] Collected:  02/28/17 1640    Specimen:  Blood from Arm, Right Updated:  02/28/17 2002    Blood Culture [[id number]] Collected:  02/28/17 1650    Specimen:  Blood from Hand, Right Updated:  02/28/17 2003    POC Glucose Fingerstick [[id number]]  (Abnormal) Collected:  02/28/17 2126    Specimen:  Blood Updated:  02/28/17 2128     Glucose 189 (H) mg/dL     Narrative:       Meter: [id number] : [id number] [place]        Imaging Results (last 24 hours)     Procedure Component Value Units Date/Time    XR Foot 3+ View Left [[id number]]      Updated:  02/28/17 1733          Physician Progress Notes (last 24 hours) (Notes from 2/28/2017  1:07 AM through 3/1/2017  1:07 AM)     No notes of this type exist for this encounter.        Consult Notes (last 24 hours) (Notes from 2/28/2017  1:07 AM through 3/1/2017  1:07 AM)     No notes of this type exist for this encounter.

## 2017-03-01 NOTE — PROGRESS NOTES
Discharge Planning Assessment  [facility]     Patient Name: Kendrick Crystal  MRN: [id number]  Today's Date: 3/1/2017    Admit Date: 2/28/2017          Discharge Needs Assessment       03/01/17 1348    Living Environment    Lives With spouse    Provides Primary Care For no one, unable/limited ability to care for self    Quality Of Family Relationships supportive    Able to Return to Prior Living Arrangements no    Discharge Needs Assessment    Readmission Within The Last 30 Days previous discharge plan unsuccessful    Equipment Currently Used at Home commode;cane, straight;walker, rolling;wheelchair;raised toilet;shower chair    Discharge Facility/Level Of Care Needs acute rehab    Transportation Available car;family or friend will provide            Discharge Plan       03/01/17 1349    Case Management/Social Work Plan    Plan ONGOING    Patient/Family In Agreement With Plan yes    Additional Comments Met with pt and wife at bedside.  They reside in [facility].  Has been DCd from [facility] services.  Has all necessary DME.  Confirmed he has [employer] with Rx coverage.  Anticipated need for acute rehab upon DC.  CM will cont to follow.        Discharge Placement     No information found                Demographic Summary       03/01/17 1346    Referral Information    Admission Type inpatient    Referral Source admission list    Reason For Consult discharge planning    Record Reviewed history and physical;medical record    Contact Information    Permission Granted to Share Information With ;family/designee    Primary Care Physician Information    Name Juan Rios            Functional Status       03/01/17 1347    Functional Status Prior    Ambulation 1-->assistive equipment    Transferring 1-->assistive equipment    Toileting 1-->assistive equipment    Bathing 2-->assistive person    Dressing 0-->independent    Eating 0-->independent    Communication 0-->understands/communicates without difficulty     IADL    Medications assistive person    Meal Preparation assistive person    Housekeeping assistive person    Laundry assistive person    Shopping assistive person    Oral Care independent    Activity Tolerance    Usual Activity Tolerance fair            Psychosocial     None            Abuse/Neglect     None            Legal     None            Substance Abuse     None            Patient Forms     None          Christelle Cuevas

## 2017-03-01 NOTE — PROGRESS NOTES
"Adult Nutrition  Assessment/PES    Patient Name:  Kendrick Crystal  YOB: 1970  MRN: [id number]  Admit Date:  2/28/2017    Assessment Date:  3/1/2017        Reason for Assessment       03/01/17 1154    Reason for Assessment    Reason For Assessment/Visit identified at risk by screening criteria    Identified At Risk By Screening Criteria MST SCORE 2+    Time Spent (min) 20    Diagnosis Diagnosis    Cardiac HTN;Hypercholesterolemia    Endocrine DM Type 2    Hepatic Cirrhosis;Encephalopathy;NAFLD    Infectious Disease Sepsis    Ortho Other (comment)   Osteomyelitis of L foot related to uncontrolled DM2; History of L 4th/5th toe amputations (1/2017)              Nutrition/Diet History       03/01/17 1158    Nutrition/Diet History    Reported/Observed By Family    Other Patient was out for procedure. Per wife, patient has had some weight loss. UBW is around ~244#. Patient has had decreased appetite due to depression/nausea/pain since toe amputation surgery in Jan 2017. Patient only drank milk this AM before procedure.            Anthropometrics       03/01/17 1201    Anthropometrics    Height 190.5 cm (75\")    Weight 106 kg (233 lb 11 oz)    Ideal Body Weight (IBW)    Ideal Body Weight (IBW), Male (kg) 90.45    % Ideal Body Weight 117.44    Usual Body Weight (UBW)    Usual Body Weight 114 kg (251 lb)   per progress note from 1/18/17 patient reported weight    % Usual Body Weight 93.1    Weight Loss 8.165 kg (18 lb)    % Weight Loss  7.2 %    Weight Loss Time Frame 1 1/2 months    Body Mass Index (BMI)    BMI (kg/m2) 29.27            Labs/Tests/Procedures/Meds       03/01/17 1204    Labs/Tests/Procedures/Meds    Labs/Tests Review Reviewed;Hgb A1C                Nutrition Prescription Ordered       03/01/17 1205    Nutrition Prescription PO    Current PO Diet Regular    Common Modifiers Cardiac;Consistent Carbohydrate            Evaluation of Received Nutrient/Fluid Intake       03/01/17 1205    PO Evaluation "    Number of Days PO Intake Evaluated Insufficient Data   no recorded PO intake              Problem/Interventions:        Problem 1       03/01/17 1205    Nutrition Diagnoses Problem 1    Problem 1 Unintended Weight Loss    Etiology (related to) Medical Diagnosis   clinical condition    Signs/Symptoms (evidenced by) Unintended Weight Change    Unintended Weight Change Loss    Number of Pounds Lost 18 lb    Weight loss time period 1 1/2 months                    Intervention Goal       03/01/17 1206    Intervention Goal    General Nutrition support treatment    PO Increase intake            Nutrition Intervention       03/01/17 1207    Nutrition Intervention    RD/Tech Action Advise alternate selection;Menu provided;Await begin PO;Recommend/ordered;Supplement provided;Follow Tx progress;Care plan reviewd    Recommended/Ordered Supplement            Nutrition Prescription       03/01/17 1207    Nutrition Prescription PO    PO Prescription Begin/change supplement    Supplement Boost Glucose Control    Supplement Frequency 3 times a day    New PO Prescription Ordered? Yes            Education/Evaluation       03/01/17 1207    Monitor/Evaluation    Monitor Per protocol;PO intake;Supplement intake;Weight;Pertinent labs        Electronically signed by:  Atiya Keys  03/01/17 12:08 PM

## 2017-03-01 NOTE — PLAN OF CARE
Problem: Patient Care Overview (Adult)  Goal: Plan of Care Review  Outcome: Ongoing (interventions implemented as appropriate)    03/01/17 1525   Coping/Psychosocial Response Interventions   Plan Of Care Reviewed With patient;spouse   Patient Care Overview   Progress no change         Problem: Infection, Risk/Actual (Adult)  Goal: Identify Related Risk Factors and Signs and Symptoms  Outcome: Ongoing (interventions implemented as appropriate)    03/01/17 1525   Infection, Risk/Actual   Infection, Risk/Actual: Related Risk Factors poor personal hygiene;malnutrition;skin integrity impairment;tissue perfusion altered   Signs and Symptoms (Infection, Risk/Actual) body temperature changes;chills;feeding/eating intolerance;heart rate increase;lab value changes;nausea;pain;cultures positive;weakness       Goal: Infection Prevention/Resolution  Outcome: Ongoing (interventions implemented as appropriate)    03/01/17 1525   Infection, Risk/Actual (Adult)   Infection Prevention/Resolution making progress toward outcome

## 2017-03-01 NOTE — PLAN OF CARE
Problem: Patient Care Overview (Adult)  Goal: Plan of Care Review  Outcome: Ongoing (interventions implemented as appropriate)    03/01/17 1351   Coping/Psychosocial Response Interventions   Plan Of Care Reviewed With patient;family;other (see comments)  (RN )   Patient Care Overview   Progress no change   Outcome Evaluation   Outcome Summary/Follow up Plan Patient has necrotic left lateral foot wound. Patient has had surgical interventions in the past and removal of metatarsals. CT surgery following. Probable amputation. Packed wet-to-dry. Will sign off. Please contact [facility] if any further needs arise. Thanks

## 2017-03-01 NOTE — PROGRESS NOTES
[facility] Medicine Services    ASSESSMENT / PLAN             Sepsis, secondary to left foot infection, chronic foot ulcers, s/p recent 3rd-5th left metatarsal amputations by Dr. Martinez 1/30/17 and followed for outpt IV abx by Dr. Garcia. He was recently readmitted 2/13-2/17/17 for consideration of further amputation but instead decision was made to cont aggressive abx and a wound vac was placed as well at discharge.presented with noncompliance with his outpt IV abx and worsened left foot wounds, fevers at home, with foul smelling copious purulence, elevated inflammatory markers, leukocytosis and possibly and new heart murmur:  Rule out endocarditis with probable MRSA bacteremia  DUNLAP Cirrhosis  Essential hypertension, stable  Non-compliance  Hyperlipemia  Poorly controlled type 2 diabetes mellitus with peripheral neuropathy.   Lab Results   Component Value Date    HGBA1C 9.70 (H) 02/28/2017       Osteomyelitis of left foot  Diabetic foot infection  Diabetic neuropathy     Plan--  Continue antibiotics, follow cultures, and discussed with Dr. Gena REYES as well as patient and wife at the bedside, need the left below-knee amputation now Dr. Martinez to decide on the timing,  Follow echocardiographic results  Continue Levemir as well as pre-meal insulin.  Elevated ESR, CRP, leukocytosis  Length of Stay 1 Days   Code- full code  DVT Prophylaxis- Lovenox  Condition--serious  Prognosis-- guarded, poor long-term  Expected Discharge disposition in         3-5 days   Days to rehabilitation  --Highly complex set of issues with high risk for further serious morbidity and other serious sequela, Time spent >35 minutes with > 50% time spent in the room face to face with patient and relatives       SUBJECTIVE--HPI/ Events overnight / CC- Hospital Follow up visit/ ROS-not detailed ,  as performed below    Lying in bed now agreeing for surgery if required  Gen -no fevers, chills  CV-no chest pain,  palpitations  Resp-no cough, dyspnea  GI-no N/V/D, abd pain      OBJECTIVE        Vital Sign Min/Max for last 24 hours  Temp  Min: 98 °F (36.7 °C)  Max: 102.9 °F (39.4 °C)   BP  Min: 107/73  Max: 178/101   Pulse  Min: 96  Max: 117   Resp  Min: 16  Max: 18   SpO2  Min: 90 %  Max: 97 %   No Data Recorded      Intake/Output Summary (Last 24 hours) at 03/01/17 1104  Last data filed at 03/01/17 0537   Gross per 24 hour   Intake    933 ml   Output    100 ml   Net    833 ml           Gen/Constitutional-no acute distress  CV-RRR, S1 S2 normal, 2/6 systolic murmur  Resp-CTAB, no wheezes  Abd-soft, NT, ND, +BS  Ext-no edema, left  foot under dressing not removed for examination today  Neuro-A&Ox3, no focal deficits  Flat mood, not much interested in conversation   Skin, no new rashes over last 24 hours    Medications    Current Facility-Administered Medications:   •  [START ON 3/2/2017] !Vancomycin trough at 2130 on 3/2, , Does not apply, Once, Juancho Kathleen [facility]  •  acetaminophen (TYLENOL) tablet 650 mg, 650 mg, Oral, Q4H PRN, Melisa Boles MD, 650 mg at 02/28/17 2147  •  aspirin EC tablet 81 mg, 81 mg, Oral, Daily, Melisa Boles MD, 81 mg at 03/01/17 0917  •  dextrose (D50W) solution 25 g, 25 g, Intravenous, Q15 Min PRN, Melisa Boles MD  •  dextrose (GLUTOSE) oral gel 15 g, 15 g, Oral, Q15 Min PRN, Melisa Boles MD  •  docusate sodium (COLACE) capsule 100 mg, 100 mg, Oral, BID, Melisa Boles MD, 100 mg at 02/28/17 2008  •  enoxaparin (LOVENOX) syringe 40 mg, 40 mg, Subcutaneous, Q AM, Melisa Boles MD, 40 mg at 03/01/17 0535  •  famotidine (PEPCID) tablet 20 mg, 20 mg, Oral, BID, Melisa Boles MD, 20 mg at 03/01/17 0917  •  gabapentin (NEURONTIN) capsule 1,200 mg, 1,200 mg, Oral, Q8H, Melisa Boles MD, 1,200 mg at 03/01/17 0535  •  glucagon (GLUCAGEN) injection 1 mg, 1 mg, Subcutaneous, Q15 Min PRN, Melisa Boles MD  •  insulin detemir (LEVEMIR) injection 30 Units, 30 Units, Subcutaneous, Nightly, Casie M Mayne, PA  •  insulin lispro  (humaLOG) injection 0-9 Units, 0-9 Units, Subcutaneous, 4x Daily With Meals & Nightly, Melisa Boles MD, 2 Units at 03/01/17 0917  •  insulin lispro (humaLOG) injection 10 Units, 10 Units, Subcutaneous, TID With Meals, Melisa Boles MD, 10 Units at 03/01/17 0917  •  losartan (COZAAR) tablet 100 mg, 100 mg, Oral, Q24H, Melisa Boles MD, 100 mg at 03/01/17 0918  •  Morphine sulfate (PF) injection 1 mg, 1 mg, Intravenous, Q4H PRN **AND** naloxone (NARCAN) injection 0.4 mg, 0.4 mg, Intravenous, Q5 Min PRN, Melisa Boles MD  •  ondansetron (ZOFRAN) injection 4 mg, 4 mg, Intravenous, Q6H PRN, Melisa Boles MD, 4 mg at 03/01/17 0944  •  oxyCODONE-acetaminophen (PERCOCET) 5-325 MG per tablet 1 tablet, 1 tablet, Oral, Q4H PRN, Melisa Boles MD, 1 tablet at 02/28/17 1935  •  Pharmacy to dose vancomycin, , Does not apply, Continuous PRN, Usman Dong MD  •  piperacillin-tazobactam (ZOSYN) 4.5 g/100 mL 0.9% NS IVPB (mbp), 4.5 g, Intravenous, Q6H, Usman Dong MD  •  polyethylene glycol (MIRALAX) powder 17 g, 17 g, Oral, Daily, Melisa Boles MD, 17 g at 02/28/17 2008  •  probiotic (CULTURELLE) capsule 1 capsule, 1 capsule, Oral, Daily, Melisa Boles MD, 1 capsule at 03/01/17 0917  •  sodium chloride 0.9 % flush 1-10 mL, 1-10 mL, Intravenous, PRN, Melisa Boles MD  •  sodium chloride 0.9 % flush 10 mL, 10 mL, Intravenous, PRN, Carlos Brothers MD  •  sodium chloride 0.9 % infusion, 100 mL/hr, Intravenous, Continuous, Melisa Boles MD, Last Rate: 100 mL/hr at 03/01/17 0537, 100 mL/hr at 03/01/17 0537  •  vancomycin 2000 mg/500 mL 0.9% NS IVPB ([facility]), 2,000 mg, Intravenous, Once, Juancho Kathleen RPH, 2,000 mg at 03/01/17 0915  •  vancomycin IVPB 1750 mg in 0.9% Sodium Chloride (premix) 500 mL, 1,750 mg, Intravenous, Q12H, Juancho Kathleen RPH  I have reviewed the labs, culture data, radiology results, and diagnostic studies.    Results from last 7 days  Lab Units 03/01/17  0710 02/28/17  1553   SODIUM mmol/L 132 129*   POTASSIUM mmol/L 3.8 4.3    CHLORIDE mmol/L 98* 92*   TOTAL CO2 mmol/L 25.0 26.0   BUN mg/dL 19 19   CREATININE mg/dL 0.60 0.80   CALCIUM mg/dL 9.2 9.8   GLUCOSE mg/dL 152* 255*       Results from last 7 days  Lab Units 03/01/17  0710 02/28/17  1553   WBC 10*3/mm3 37.74* 18.47*   HEMOGLOBIN g/dL 11.3* 12.8*   HEMATOCRIT % 35.0* 38.2*   PLATELETS 10*3/mm3 362 499*           Culture Data:    Radiology Results:  Imaging Results (last 24 hours)     Procedure Component Value Units Date/Time    XR Foot 3+ View Left [[id number]] Collected:  03/01/17 1036     Updated:  03/01/17 1057    Narrative:       EXAMINATION: XR FOOT 3+ VW LEFT-      INDICATION: Osteomyelitis.      COMPARISON: Compared to MR datasets 01/25/2017.     FINDINGS:   1. The third, fourth and fifth digits of the left foot have been  resected from the metatarsals distally.  2. There is rarefaction and osteoporosis with cortical mineral loss of  the head of the second metatarsal, however, active focal erosion is not  identified.  3. There is degenerative disease in the midfoot structures without  erosion. Hindfoot structures are unremarkable and the left lower leg and  ankle are unremarkable.           Impression:       1. Status post amputation third, fourth and fifth digits or raise of the  left foot involving the third, fourth and fifth metatarsals and toes.  2. Active bone erosion, fracture or destructive process is not  identified from a conventional image standpoint currently.     D:  02/28/2017  E:  03/01/2017     This report was finalized on 3/1/2017 10:55 AM by Dr. Shane Villa MD.           *. Please note that portions of this note were completed with a voice recognition program. Efforts were made to edit the dictations, but occasionally words are mistranscribed.  Unruly Lopez MD03/01/01/1711:04 AM

## 2017-03-01 NOTE — CONSULTS
Kendrick Crystal  1970  [id number]    Date of Consult: 3/1/2017    2/28/2017      Requesting Provider: No ref. provider found  Evaluating Physician: Usman Dong MD    Chief Complaint: sepsis    Reason for Consultation:sepsis, left foot osteo    History of present illness:    Patient is a 46 y.o.  Yr old male with history of uncontrolled diabetes with extensive comorbidity as previously outlined. He has lower extremity vascular disease but no specific option for revascularization per discussion with Dr. Martinez. He was admitted January 2017 with left foot infection, MRI not confirming osteomyelitis, but had surgery on January 30 with metatarsals 3/4/5 amputated, marginal perfusion per operative note and MRSA/GB strep/coag-negative staph in his various cultures. He was transferred to [facility] on broad-spectrum IV antibiotics. While at [facility], he was under the care of Dr. Herrera and, per family/patient, he was told that he required hyperbaric oxygen therapy and had very little chance for long-term healing at the foot. He  was readmitted on February 13 to [facility] with patient initially interested in having higher level amputation.  After discussion with Dr. Martinez, he changed his mind and wanted attempt at salvage again with IV antibiotics/wound care.  He was discharged February 17 as outlined in our prior inpatient notes.  He was noncompliant with follow-up in the office the week of February 20 despite our calls to the patient.  He then stopped antibiotics on his own Wednesday, February 22 and did not notify us.  His family was able to get him to come into the office on February 27 at which point he refused IV antibiotics/insisted PICC line be removed and he refused hospitalization/VAC,  understanding the risks of his behavior/decisions as outlined in my office note.  He was agreeable for prescription of oral agents and went home to consider his options.  On February 28, he called and said he  was willing to come to the hospital.  He presented to the emergency room, found to have fever/leukocytosis consistent with sepsis and subsequently found to be bacteremic with MRSA.     He denies headache photophobia or neck stiffness.  No chest pain palpitation or syncope.  He denies shortness of breath cough or hemoptysis.  No skin rash.  No dysuria hematuria or pyuria.  Left foot has remained blackened on the medial side with vague edema/redness that he describes as not progressive and 24 hours.  No other new exposures    No raw or undercooked food.  No unpasteurized milk or milk products.  No animal insect or arthropod exposure.  No tick bites.  No outdoor camping or hunting exposure.  No travel exposure.  No ill exposure.  No history of TB or TB exposure.   Denies a history of VRE and no history of C. difficile or ESBL/KPC  organisms.        Past Medical History   Diagnosis Date   • Arthritis    • Cirrhosis    • Counseling for insulin pump    • Diabetes mellitus    • Encephalopathy, hepatic    • H/O degenerative disc disease    • History of Lissa's gangrene    • Hyperlipemia    • Hypertension    • multiple Skin abscesses    • DUNLAP, bx showed stage IV fibrosis    • Neuropathy        Past Surgical History   Procedure Laterality Date   • Testicle surgery       DEBRIDEMENT FROM GANGRENE   • Leg surgery     • Amputation digit Left 1/30/2017     Procedure: left fourth and fifth transmetatarsal toe amputation ;  Surgeon: Juancho Martinez MD;  Location: [facility] OR;  Service:        Pediatric History   Patient Guardian Status   • Not on file.     Other Topics Concern   • Not on file     Social History Narrative       family history includes Arthritis in his father and mother; Cancer in his mother; Diabetes in his brother and father; Heart attack in his father; Hyperlipidemia in his father; Hypertension in his brother, father, and mother; Kidney disease in his mother; Mental illness in his sister; Obesity in his brother;  Stroke in his mother.    Allergies   Allergen Reactions   • No Known Drug Allergy        Medication:  Current Facility-Administered Medications   Medication Dose Route Frequency Provider Last Rate Last Dose   • acetaminophen (TYLENOL) tablet 650 mg  650 mg Oral Q4H PRN Melisa Boles MD   650 mg at 02/28/17 2147   • aspirin EC tablet 81 mg  81 mg Oral Daily Melisa Boels MD   81 mg at 02/28/17 2008   • dextrose (D50W) solution 25 g  25 g Intravenous Q15 Min PRN Melisa Boles MD       • dextrose (GLUTOSE) oral gel 15 g  15 g Oral Q15 Min PRN Melisa Boles MD       • docusate sodium (COLACE) capsule 100 mg  100 mg Oral BID Melisa Boles MD   100 mg at 02/28/17 2008   • enoxaparin (LOVENOX) syringe 40 mg  40 mg Subcutaneous Q AM Melisa Boles MD   40 mg at 03/01/17 0535   • famotidine (PEPCID) tablet 20 mg  20 mg Oral BID Melisa Boles MD   20 mg at 02/28/17 2008   • gabapentin (NEURONTIN) capsule 1,200 mg  1,200 mg Oral Q8H Melisa Boles MD   1,200 mg at 03/01/17 0535   • glucagon (GLUCAGEN) injection 1 mg  1 mg Subcutaneous Q15 Min PRN Melisa Boles MD       • insulin detemir (LEVEMIR) injection 30 Units  30 Units Subcutaneous Nightly Casie M Mayne, PA       • insulin lispro (humaLOG) injection 0-9 Units  0-9 Units Subcutaneous 4x Daily With Meals & Nightly Melisa Boles MD   2 Units at 02/28/17 2133   • insulin lispro (humaLOG) injection 10 Units  10 Units Subcutaneous TID With Meals Melisa Boles MD   10 Units at 02/28/17 2008   • losartan (COZAAR) tablet 100 mg  100 mg Oral Q24H Melisa Boles MD   100 mg at 02/28/17 2010   • Morphine sulfate (PF) injection 1 mg  1 mg Intravenous Q4H PRN Melisa Boles MD        And   • naloxone (NARCAN) injection 0.4 mg  0.4 mg Intravenous Q5 Min PRN Melisa Boles MD       • ondansetron (ZOFRAN) injection 4 mg  4 mg Intravenous Q6H PRN Melisa Boles MD       • oxyCODONE-acetaminophen (PERCOCET) 5-325 MG per tablet 1 tablet  1 tablet Oral Q4H PRN Melisa Boles MD   1 tablet at 02/28/17 1935   • Pharmacy to dose  vancomycin   Does not apply Continuous PRN Usman Dong MD       • piperacillin-tazobactam (ZOSYN) 4.5 g/100 mL 0.9% NS IVPB (mbp)  4.5 g Intravenous Q6H Usman Dong MD       • polyethylene glycol (MIRALAX) powder 17 g  17 g Oral Daily Melisa Boles MD   17 g at 02/28/17 2008   • probiotic (CULTURELLE) capsule 1 capsule  1 capsule Oral Daily Melisa Boles MD   1 capsule at 02/28/17 2008   • sodium chloride 0.9 % flush 1-10 mL  1-10 mL Intravenous PRN Melisa Boles MD       • sodium chloride 0.9 % flush 10 mL  10 mL Intravenous PRN Carlos Brothers MD       • sodium chloride 0.9 % infusion  100 mL/hr Intravenous Continuous Melisa Boles  mL/hr at 03/01/17 0537 100 mL/hr at 03/01/17 0537   • vancomycin 2000 mg/500 mL 0.9% NS IVPB ([facility])  2,000 mg Intravenous Once Juancho Kathleen RPH       • vancomycin IVPB 1750 mg in 0.9% Sodium Chloride (premix) 500 mL  1,750 mg Intravenous Q12H Juancho Kathleen RPH           Antibiotics:  IV Anti-Infectives     Ordered     Dose/Rate Route Frequency Start Stop    03/01/17 0914  vancomycin IVPB 1750 mg in 0.9% Sodium Chloride (premix) 500 mL     Ordering Provider:  Juancho Kathleen RPH    1,750 mg  over 120 Minutes Intravenous Every 12 Hours 03/01/17 2200      03/01/17 0843  piperacillin-tazobactam (ZOSYN) 4.5 g/100 mL 0.9% NS IVPB (mbp)     Ordering Provider:  Usman Dong MD    4.5 g Intravenous Every 6 Hours 03/01/17 1000      03/01/17 0848  vancomycin 2000 mg/500 mL 0.9% NS IVPB ([facility])     Ordering Provider:  Juancho Kathleen RPH    2,000 mg  over 120 Minutes Intravenous Once 03/01/17 0930      03/01/17 0843  Pharmacy to dose vancomycin     Ordering Provider:  Usman Dong MD     Does not apply Continuous PRN 03/01/17 0841      02/28/17 1753  DAPTOmycin (CUBICIN) 450 mg in sodium chloride 0.9 % 50 mL IVPB     [facility] reviewed the order on 02/28/17 1936.   Ordering Provider:  Usman Dong MD    450 mg  100 mL/hr over 30 Minutes Intravenous Every 24  "Hours 02/28/17 1800 02/28/17 2040    02/28/17 1753  cefTRIAXone (ROCEPHIN) IVPB 1 g     Ordering Provider:  Carlos Brtohers MD    1 g  over 30 Minutes Intravenous Once 02/28/17 1755 02/28/17 1842            Review of Systems    Constitutional-- appetite diminished with generalized malaise and fatigue  Heent-- No new vision, hearing or throat complaints.  No epistaxis or oral sores.  Denies odynophagia or dysphagia.  No flashers, floaters or eye pain. No odynophagia or dysphagia. No headache, photophobia or neck stiffness.  CV-- No chest pain, palpitation or syncope  Resp-- No SOB/cough/Hemoptysis  GI- No nausea, vomiting, or diarrhea.  No hematochezia, melena, or hematemesis. Denies jaundice or chronic liver disease.  -- No dysuria, hematuria, or flank pain.  Denies hesitancy, urgency or flank pain.  Lymph- no swollen lymph nodes in neck/axilla or groin.   Heme- No active bruising or bleeding; no Hx of DVT or PE.  MS-- no swelling or pain in the bones or joints of arms/legs aside from above.  No new back pain.  Neuro-- No acute focal weakness or numbness in the arms or legs.  No seizures.    Full 12 point review of systems reviewed and negative otherwise for acute complaints, except for    Physical Exam:   Vital Signs   Visit Vitals   • /68 (BP Location: Left arm, Patient Position: Lying)   • Pulse 96   • Temp 98.1 °F (36.7 °C) (Oral)   • Resp 18   • Ht 75\" (190.5 cm)   • Wt 234 lb 6.4 oz (106 kg)   • SpO2 90%   • BMI 29.3 kg/m2       GENERAL: Awake and alert, in no acute distress.   HEENT: Normocephalic, atraumatic.  PERRL. EOMI. No conjunctival injection. No icterus. Oropharynx clear without evidence of thrush or exudate. No evidence of peridontal disease.    NECK: Supple without nuchal rigidity. No mass.  LYMPH: No cervical, axillary or inguinal lymphadenopathy.  HEART: RRR; soft systolic murmur left sternal border, rubs, gallops.   LUNGS: Clear to auscultation bilaterally without wheezing, rales, " rhonchi. Normal respiratory effort. Nonlabored. No dullness.  ABDOMEN: Soft, nontender, nondistended. Positive bowel sounds. No rebound or guarding. NO mass or HSM.  EXT:  No cyanosis, clubbing or edema. No cord.  : Genitalia generally unremarkable.  Without Walker catheter.  MSK: FROM without joint effusions noted arms/legs.    SKIN: Warm and dry without cutaneous eruptions on Inspection/palpation.    NEURO: Oriented to PPT. No focal deficits on motor/sensory exam at arms/legs.  PSYCHIATRIC: Normal insight and judgement. Cooperative with PE    Left foot with gangrene/black tissue on the medial side with purulence and vague surrounding erythema/edema.  No discrete mass bulge or fluctuance.  No crepitus or bulla    No peripheral stigmata/phenomena of endocarditis    Laboratory Data      Results from last 7 days  Lab Units 02/28/17  1553   WBC 10*3/mm3 18.47*   HEMOGLOBIN g/dL 12.8*   HEMATOCRIT % 38.2*   PLATELETS 10*3/mm3 499*       Results from last 7 days  Lab Units 03/01/17  0710   SODIUM mmol/L 132   POTASSIUM mmol/L 3.8   CHLORIDE mmol/L 98*   TOTAL CO2 mmol/L 25.0   BUN mg/dL 19   CREATININE mg/dL 0.60   GLUCOSE mg/dL 152*   CALCIUM mg/dL 9.2           Results from last 7 days  Lab Units 02/28/17  1553   SED RATE mm/hr 127*       Results from last 7 days  Lab Units 02/28/17  1553   CRP mg/dL 135.10*       Estimated Creatinine Clearance: 198.3 mL/min (by C-G formula based on Cr of 0.6).      Microbiology:      Radiology:  Imaging Results (last 72 hours)     Procedure Component Value Units Date/Time    XR Foot 3+ View Left [[id number]]      Updated:  02/28/17 1103            Impression:   --MRSA bacteremia/sepsis.  Likely associated with his left foot osteomyelitis/infection, with general progression of illness likely as a direct result of his medical noncompliance and recent refusal of care.  He and his family understand the implication of his recent behavior.  They're now agreeable to treatment including  surgery/IV antibiotics.  He understands potential further serious morbidity and other serious sequela/mortality including metastatic foci of involvement, risk for endovascular involvement/endocarditis and that antibiotics/surgery are not a guarantee for cure.  In light of recent daptomycin exposure, we will use vancomycin for the MRSA until further EYAD data known.  Risk for possible evolving daptomycin resistance on daptomycin.    --Left foot gangrene/osteomyelitis with progression as above.  He is now agreeable to surgery and I have discussed directly with Dr. Martinez.  Option/timing/threshold per Dr. Martinez, although I did discuss with Dr. Martinez the positive blood culture/septic presentation and that in general would be worthwhile to pursue surgery ASAP.  Broad mixed coverage with vancomycin/Zosyn.    --Medical noncompliance as above    --Diabetes type 2.  You need to tightly control blood sugar to give best chance for healing    --Peripheral vascular disease as previously outlined    --Heart murmur by exam associated with the MRSA bacteremia.  He needs to have SHMUEL to evaluate for possible endocarditis    PLAN: Thank you for asking us to see Kendrick Crystal, I recommend the following:    --IV vancomycin/Zosyn    --I discussed potential risks and benefits of the prescribed antibiotics that include, but are not limited to, solid organ toxicity, neuro/bala/vestibular toxicity, renal toxicity, CDiff, cytopenias, hypersensitivity,  etc.. Patient/Family voice understanding and agree to proceed.    --Monitor IV and IV antibiotics with risk for systemic complication and potential drug interaction    --Check/review labs cultures and scans    --Highly complex set of issues with high risk for further serious morbidity and other serious sequela    --Discussed with patient's family, Dr. Martinez, and Dr. Lopez, and microbiology.    --Dr. Martinez to arrange SHMUEL       Usman Dong MD  3/1/2017

## 2017-03-01 NOTE — PROGRESS NOTES
Pharmacy Consult--Antimicrobial Dosing  Pt is a 45 yo male that pharmacy has been consulted to dose vancomycin secondary to bacteremia.  Patient was recently D/C on ceftriaxone/daptomycin and admittedly noncompliant with his outpatient infusions.      Indication: Bacteremia  Consulting Provider: Dr. Dong    Current Antimicrobial Therapy  1. Zosyn 4.5g every 6 hours.   2. Vancomycin: PTD     Allergies  No known drug allergy    Labs      Results from last 7 days     Lab Units 03/01/17  0710 02/28/17  1553   SODIUM mmol/L 132 129*   POTASSIUM mmol/L 3.8 4.3   CHLORIDE mmol/L 98* 92*   TOTAL CO2 mmol/L 25.0 26.0   BUN mg/dL 19 19   CREATININE mg/dL 0.60 0.80   CALCIUM mg/dL 9.2 9.8   GLUCOSE mg/dL 152* 255*       Results from last 7 days     Lab Units 02/28/17  1553   WBC 10*3/mm3 18.47*   HEMOGLOBIN g/dL 12.8*   HEMATOCRIT % 38.2*   PLATELETS 10*3/mm3 499*     Evaluation of Dosing  Weight: 106kg   Last Dose Received: none  Goal Trough: 15-20    Estimated Creatinine Clearance: 198.3 mL/min (by C-G formula based on Cr of 0.6).    I/O last 3 completed shifts:  In: 933 [I.V.:883; IV Piggyback:50]  Out: 100 [Urine:100]  @[facility]@    Microbiology and Radiology  BLOOD CULTURE   Date Value Ref Range Status   02/28/2017 Abnormal Stain (A)  Preliminary   GPC in the blood    Evaluation of Level  Vancomycin trough scheduled prior to the fourth dose at 2130 on 3/2.    Assessment  Renal function at historical baseline with some UOP documented.  Patient was previously on 1750mg every 12 hours during last admission.      Plan:  1. Vancomycin 2000mg (~20mg/kg) x1. Then Vancomycin 1750mg every 12 hours.   2. Vancomycin trough scheduled for 2130 on 3/2 prior to the fourth dose.   3. Monitor for s/sx of nephrotoxicity.   4. Pharmacy will continue to monitor and adjust vancomycin dose as necessary based on renal function, cultures, labs, and clinical status.     Juancho Kathleen, PharmD  3/1/2017  9:06 AM

## 2017-03-01 NOTE — PLAN OF CARE
Problem: Patient Care Overview (Adult)  Goal: Plan of Care Review  Outcome: Ongoing (interventions implemented as appropriate)    03/01/17 0339   Coping/Psychosocial Response Interventions   Plan Of Care Reviewed With patient   Patient Care Overview   Progress progress toward functional goals as expected         Problem: Infection, Risk/Actual (Adult)  Goal: Identify Related Risk Factors and Signs and Symptoms  Outcome: Ongoing (interventions implemented as appropriate)    03/01/17 0339   Infection, Risk/Actual   Infection, Risk/Actual: Related Risk Factors poor personal hygiene;skin integrity impairment   Signs and Symptoms (Infection, Risk/Actual) body temperature changes;malaise

## 2017-03-02 ENCOUNTER — ANESTHESIA EVENT (OUTPATIENT)
Dept: PERIOP | Facility: HOSPITAL | Age: 47
End: 2017-03-02

## 2017-03-02 ENCOUNTER — ANESTHESIA (OUTPATIENT)
Dept: PERIOP | Facility: HOSPITAL | Age: 47
End: 2017-03-02

## 2017-03-02 LAB
ABO GROUP BLD: NORMAL
ANION GAP SERPL CALCULATED.3IONS-SCNC: 5 MMOL/L (ref 3–11)
BLD GP AB SCN SERPL QL: NEGATIVE
BUN BLD-MCNC: 28 MG/DL (ref 9–23)
BUN/CREAT SERPL: 35 (ref 7–25)
CALCIUM SPEC-SCNC: 7.7 MG/DL (ref 8.7–10.4)
CHLORIDE SERPL-SCNC: 102 MMOL/L (ref 99–109)
CO2 SERPL-SCNC: 26 MMOL/L (ref 20–31)
CREAT BLD-MCNC: 0.8 MG/DL (ref 0.6–1.3)
DEPRECATED RDW RBC AUTO: 43 FL (ref 37–54)
ERYTHROCYTE [DISTWIDTH] IN BLOOD BY AUTOMATED COUNT: 14.3 % (ref 11.3–14.5)
GFR SERPL CREATININE-BSD FRML MDRD: 103 ML/MIN/1.73
GLUCOSE BLD-MCNC: 162 MG/DL (ref 70–100)
GLUCOSE BLDC GLUCOMTR-MCNC: 113 MG/DL (ref 70–130)
GLUCOSE BLDC GLUCOMTR-MCNC: 125 MG/DL (ref 70–130)
GLUCOSE BLDC GLUCOMTR-MCNC: 155 MG/DL (ref 70–130)
GLUCOSE BLDC GLUCOMTR-MCNC: 97 MG/DL (ref 70–130)
HCT VFR BLD AUTO: 32.8 % (ref 38.9–50.9)
HGB BLD-MCNC: 10.8 G/DL (ref 13.1–17.5)
INR PPP: 1.24
MCH RBC QN AUTO: 27 PG (ref 27–31)
MCHC RBC AUTO-ENTMCNC: 32.9 G/DL (ref 32–36)
MCV RBC AUTO: 82 FL (ref 80–99)
PLATELET # BLD AUTO: 272 10*3/MM3 (ref 150–450)
PMV BLD AUTO: 10.4 FL (ref 6–12)
POTASSIUM BLD-SCNC: 3.7 MMOL/L (ref 3.5–5.5)
PROTHROMBIN TIME: 13.6 SECONDS (ref 9.6–11.5)
RBC # BLD AUTO: 4 10*6/MM3 (ref 4.2–5.76)
RH BLD: POSITIVE
SODIUM BLD-SCNC: 133 MMOL/L (ref 132–146)
VANCOMYCIN TROUGH SERPL-MCNC: 16.3 MCG/ML (ref 10–20)
WBC NRBC COR # BLD: 12.24 10*3/MM3 (ref 3.5–10.8)

## 2017-03-02 PROCEDURE — 0Y6J0Z1 DETACHMENT AT LEFT LOWER LEG, HIGH, OPEN APPROACH: ICD-10-PCS | Performed by: THORACIC SURGERY (CARDIOTHORACIC VASCULAR SURGERY)

## 2017-03-02 PROCEDURE — 25010000002 NEOSTIGMINE 10 MG/10ML SOLUTION: Performed by: ANESTHESIOLOGY

## 2017-03-02 PROCEDURE — 86850 RBC ANTIBODY SCREEN: CPT

## 2017-03-02 PROCEDURE — 25010000002 MIDAZOLAM PER 1 MG: Performed by: ANESTHESIOLOGY

## 2017-03-02 PROCEDURE — 25010000002 PIPERACILLIN-TAZOBACTAM: Performed by: INTERNAL MEDICINE

## 2017-03-02 PROCEDURE — 80202 ASSAY OF VANCOMYCIN: CPT

## 2017-03-02 PROCEDURE — 25010000002 FENTANYL CITRATE (PF) 100 MCG/2ML SOLUTION: Performed by: ANESTHESIOLOGY

## 2017-03-02 PROCEDURE — 88307 TISSUE EXAM BY PATHOLOGIST: CPT | Performed by: THORACIC SURGERY (CARDIOTHORACIC VASCULAR SURGERY)

## 2017-03-02 PROCEDURE — 99232 SBSQ HOSP IP/OBS MODERATE 35: CPT | Performed by: FAMILY MEDICINE

## 2017-03-02 PROCEDURE — 25010000002 VANCOMYCIN 1750 MG/500ML SOLUTION

## 2017-03-02 PROCEDURE — 82962 GLUCOSE BLOOD TEST: CPT

## 2017-03-02 PROCEDURE — 86901 BLOOD TYPING SEROLOGIC RH(D): CPT

## 2017-03-02 PROCEDURE — 86900 BLOOD TYPING SEROLOGIC ABO: CPT

## 2017-03-02 PROCEDURE — 25010000002 DEXAMETHASONE PER 1 MG: Performed by: ANESTHESIOLOGY

## 2017-03-02 PROCEDURE — 85027 COMPLETE CBC AUTOMATED: CPT | Performed by: PHYSICIAN ASSISTANT

## 2017-03-02 PROCEDURE — 25010000002 PROPOFOL 10 MG/ML EMULSION: Performed by: ANESTHESIOLOGY

## 2017-03-02 PROCEDURE — 25010000002 BUPRENORPHINE PER 0.1 MG: Performed by: ANESTHESIOLOGY

## 2017-03-02 PROCEDURE — 80048 BASIC METABOLIC PNL TOTAL CA: CPT | Performed by: PHYSICIAN ASSISTANT

## 2017-03-02 PROCEDURE — 25010000002 ENOXAPARIN PER 10 MG: Performed by: FAMILY MEDICINE

## 2017-03-02 PROCEDURE — B24BZZ4 ULTRASONOGRAPHY OF HEART WITH AORTA, TRANSESOPHAGEAL: ICD-10-PCS | Performed by: THORACIC SURGERY (CARDIOTHORACIC VASCULAR SURGERY)

## 2017-03-02 PROCEDURE — 85610 PROTHROMBIN TIME: CPT | Performed by: PHYSICIAN ASSISTANT

## 2017-03-02 RX ORDER — FENTANYL CITRATE 50 UG/ML
50 INJECTION, SOLUTION INTRAMUSCULAR; INTRAVENOUS
Status: CANCELLED | OUTPATIENT
Start: 2017-03-02

## 2017-03-02 RX ORDER — HYDROMORPHONE HYDROCHLORIDE 1 MG/ML
0.5 INJECTION, SOLUTION INTRAMUSCULAR; INTRAVENOUS; SUBCUTANEOUS
Status: CANCELLED | OUTPATIENT
Start: 2017-03-02

## 2017-03-02 RX ORDER — MAGNESIUM HYDROXIDE 1200 MG/15ML
LIQUID ORAL AS NEEDED
Status: DISCONTINUED | OUTPATIENT
Start: 2017-03-02 | End: 2017-03-02 | Stop reason: HOSPADM

## 2017-03-02 RX ORDER — FENTANYL CITRATE 50 UG/ML
INJECTION, SOLUTION INTRAMUSCULAR; INTRAVENOUS AS NEEDED
Status: DISCONTINUED | OUTPATIENT
Start: 2017-03-02 | End: 2017-03-02 | Stop reason: SURG

## 2017-03-02 RX ORDER — GLYCOPYRROLATE 0.2 MG/ML
INJECTION INTRAMUSCULAR; INTRAVENOUS AS NEEDED
Status: DISCONTINUED | OUTPATIENT
Start: 2017-03-02 | End: 2017-03-02 | Stop reason: SURG

## 2017-03-02 RX ORDER — LIDOCAINE HYDROCHLORIDE 10 MG/ML
INJECTION, SOLUTION EPIDURAL; INFILTRATION; INTRACAUDAL; PERINEURAL AS NEEDED
Status: DISCONTINUED | OUTPATIENT
Start: 2017-03-02 | End: 2017-03-02 | Stop reason: SURG

## 2017-03-02 RX ORDER — SODIUM CHLORIDE, SODIUM LACTATE, POTASSIUM CHLORIDE, CALCIUM CHLORIDE 600; 310; 30; 20 MG/100ML; MG/100ML; MG/100ML; MG/100ML
INJECTION, SOLUTION INTRAVENOUS CONTINUOUS PRN
Status: DISCONTINUED | OUTPATIENT
Start: 2017-03-02 | End: 2017-03-02 | Stop reason: SURG

## 2017-03-02 RX ORDER — ATRACURIUM BESYLATE 10 MG/ML
INJECTION, SOLUTION INTRAVENOUS AS NEEDED
Status: DISCONTINUED | OUTPATIENT
Start: 2017-03-02 | End: 2017-03-02 | Stop reason: SURG

## 2017-03-02 RX ORDER — MIDAZOLAM HYDROCHLORIDE 1 MG/ML
INJECTION INTRAMUSCULAR; INTRAVENOUS AS NEEDED
Status: DISCONTINUED | OUTPATIENT
Start: 2017-03-02 | End: 2017-03-02 | Stop reason: SURG

## 2017-03-02 RX ORDER — HYDROCODONE BITARTRATE AND ACETAMINOPHEN 10; 325 MG/1; MG/1
1 TABLET ORAL EVERY 6 HOURS PRN
Status: DISCONTINUED | OUTPATIENT
Start: 2017-03-02 | End: 2017-03-07 | Stop reason: HOSPADM

## 2017-03-02 RX ORDER — NEOSTIGMINE METHYLSULFATE 1 MG/ML
INJECTION, SOLUTION INTRAVENOUS AS NEEDED
Status: DISCONTINUED | OUTPATIENT
Start: 2017-03-02 | End: 2017-03-02 | Stop reason: SURG

## 2017-03-02 RX ORDER — PROPOFOL 10 MG/ML
VIAL (ML) INTRAVENOUS AS NEEDED
Status: DISCONTINUED | OUTPATIENT
Start: 2017-03-02 | End: 2017-03-02 | Stop reason: SURG

## 2017-03-02 RX ADMIN — TAZOBACTAM SODIUM AND PIPERACILLIN SODIUM 4.5 G: .5; 4 INJECTION, POWDER, LYOPHILIZED, FOR SOLUTION INTRAVENOUS at 12:22

## 2017-03-02 RX ADMIN — GABAPENTIN 1200 MG: 300 CAPSULE ORAL at 13:53

## 2017-03-02 RX ADMIN — ASPIRIN 81 MG: 81 TABLET, COATED ORAL at 09:50

## 2017-03-02 RX ADMIN — TAZOBACTAM SODIUM AND PIPERACILLIN SODIUM 4.5 G: .5; 4 INJECTION, POWDER, LYOPHILIZED, FOR SOLUTION INTRAVENOUS at 16:32

## 2017-03-02 RX ADMIN — FENTANYL CITRATE 100 MCG: 50 INJECTION, SOLUTION INTRAMUSCULAR; INTRAVENOUS at 19:15

## 2017-03-02 RX ADMIN — FENTANYL CITRATE 50 MCG: 50 INJECTION, SOLUTION INTRAMUSCULAR; INTRAVENOUS at 18:30

## 2017-03-02 RX ADMIN — GABAPENTIN 1200 MG: 300 CAPSULE ORAL at 23:38

## 2017-03-02 RX ADMIN — ROBINUL 0.2 MG: 0.2 INJECTION INTRAMUSCULAR; INTRAVENOUS at 19:25

## 2017-03-02 RX ADMIN — ATRACURIUM BESYLATE 50 MG: 10 INJECTION, SOLUTION INTRAVENOUS at 17:56

## 2017-03-02 RX ADMIN — Medication 1 CAPSULE: at 09:50

## 2017-03-02 RX ADMIN — TAZOBACTAM SODIUM AND PIPERACILLIN SODIUM 4.5 G: .5; 4 INJECTION, POWDER, LYOPHILIZED, FOR SOLUTION INTRAVENOUS at 23:05

## 2017-03-02 RX ADMIN — ENOXAPARIN SODIUM 40 MG: 40 INJECTION SUBCUTANEOUS at 04:38

## 2017-03-02 RX ADMIN — NEOSTIGMINE METHYLSULFATE 2 MG: 1 INJECTION, SOLUTION INTRAVENOUS at 19:25

## 2017-03-02 RX ADMIN — SODIUM CHLORIDE 1000 ML: 9 INJECTION, SOLUTION INTRAVENOUS at 03:18

## 2017-03-02 RX ADMIN — BUPIVACAINE HYDROCHLORIDE 30 ML: 2.5 INJECTION, SOLUTION EPIDURAL; INFILTRATION; INTRACAUDAL; PERINEURAL at 17:30

## 2017-03-02 RX ADMIN — FENTANYL CITRATE 100 MCG: 50 INJECTION, SOLUTION INTRAMUSCULAR; INTRAVENOUS at 17:56

## 2017-03-02 RX ADMIN — MIDAZOLAM HYDROCHLORIDE 2 MG: 1 INJECTION, SOLUTION INTRAMUSCULAR; INTRAVENOUS at 17:53

## 2017-03-02 RX ADMIN — SODIUM CHLORIDE 100 ML/HR: 900 INJECTION INTRAVENOUS at 04:37

## 2017-03-02 RX ADMIN — LIDOCAINE HYDROCHLORIDE 50 MG: 10 INJECTION, SOLUTION EPIDURAL; INFILTRATION; INTRACAUDAL; PERINEURAL at 17:56

## 2017-03-02 RX ADMIN — BUPRENORPHINE HYDROCHLORIDE 0.3 MG: 0.3 INJECTION INTRAMUSCULAR; INTRAVENOUS at 17:30

## 2017-03-02 RX ADMIN — DEXAMETHASONE SODIUM PHOSPHATE 4 MG: 4 INJECTION, SOLUTION INTRAMUSCULAR; INTRAVENOUS at 17:30

## 2017-03-02 RX ADMIN — LOSARTAN POTASSIUM 100 MG: 50 TABLET, FILM COATED ORAL at 09:50

## 2017-03-02 RX ADMIN — SODIUM CHLORIDE, POTASSIUM CHLORIDE, SODIUM LACTATE AND CALCIUM CHLORIDE: 600; 310; 30; 20 INJECTION, SOLUTION INTRAVENOUS at 17:48

## 2017-03-02 RX ADMIN — OXYCODONE AND ACETAMINOPHEN 1 TABLET: 5; 325 TABLET ORAL at 04:46

## 2017-03-02 RX ADMIN — PROPOFOL 150 MG: 10 INJECTION, EMULSION INTRAVENOUS at 17:56

## 2017-03-02 RX ADMIN — INSULIN LISPRO 2 UNITS: 100 INJECTION, SOLUTION INTRAVENOUS; SUBCUTANEOUS at 08:39

## 2017-03-02 RX ADMIN — SODIUM CHLORIDE 100 ML/HR: 900 INJECTION INTRAVENOUS at 23:01

## 2017-03-02 RX ADMIN — FAMOTIDINE 20 MG: 20 TABLET ORAL at 09:50

## 2017-03-02 RX ADMIN — HYDROCODONE BITARTRATE AND ACETAMINOPHEN 1 TABLET: 10; 325 TABLET ORAL at 23:39

## 2017-03-02 RX ADMIN — SODIUM CHLORIDE 1000 ML: 9 INJECTION, SOLUTION INTRAVENOUS at 02:31

## 2017-03-02 RX ADMIN — TAZOBACTAM SODIUM AND PIPERACILLIN SODIUM 4.5 G: .5; 4 INJECTION, POWDER, LYOPHILIZED, FOR SOLUTION INTRAVENOUS at 04:47

## 2017-03-02 RX ADMIN — HYDROCODONE BITARTRATE AND ACETAMINOPHEN 1 TABLET: 10; 325 TABLET ORAL at 08:43

## 2017-03-02 RX ADMIN — Medication 1750 MG: at 09:53

## 2017-03-02 NOTE — PROGRESS NOTES
[facility] Medicine Services    ASSESSMENT / PLAN             Sepsis, secondary to left foot infection, chronic foot ulcers, s/p recent 3rd-5th left metatarsal amputations by Dr. Martinez 1/30/17     · Rule out endocarditis with probable MRSA bacteremia-Echo Left ventricular function is normal. Estimated EF = 65%.  · Left ventricular wall thickness is consistent with mild concentric hypertrophy.  All left ventricular wall segments contract normally.  DUNLAP Cirrhosis  Essential hypertension, stable  Non-compliance  Hyperlipemia  Poorly controlled type 2 diabetes mellitus with peripheral neuropathy.   Lab Results   Component Value Date    HGBA1C 9.70 (H) 02/28/2017       Osteomyelitis of left foot  Diabetic foot infection  Diabetic neuropathy     Plan--  Plan--  L BKA with Dr Martinez today  Afebrile today and leukocytosis improved, b/c mrsa   Continue antibiotics, follow cultures, and discussed with Dr. Gena REYES as well as patient and wife at the bedside   Echo reviewed Continue Levemir as well as pre-meal insulin.  Elevated ESR, CRP, leukocytosis  Length of Stay 2 Days   Code- full code  DVT Prophylaxis- Lovenox  Condition--serious  Prognosis-- guarded, poor long-term  Expected Discharge disposition in         3-4 days   Days to rehabilitation  --Highly complex set of issues with high risk for further serious morbidity and other serious sequela, Time spent >35 minutes with > 50% time spent in the room face to face with patient and relatives       SUBJECTIVE--HPI/ Events overnight / CC- Hospital Follow up visit/ ROS-not detailed ,  as performed below    Lying in bed now agreeing for surgery , going for surgery today   Gen -no fevers, chills  CV-no chest pain, palpitations  Resp-no cough, dyspnea  GI-no N/V/D, abd pain      OBJECTIVE        Vital Sign Min/Max for last 24 hours  Temp  Min: 97.1 °F (36.2 °C)  Max: 103.2 °F (39.6 °C)   BP  Min: 95/61  Max: 129/75   Pulse  Min: 91  Max: 112   Resp   Min: 18  Max: 20   SpO2  Min: 89 %  Max: 92 %   No Data Recorded    No intake or output data in the 24 hours ending 03/02/17 1406        Gen/Constitutional-no acute distress  CV-RRR, S1 S2 normal, 2/6 systolic murmur  Resp-CTAB, no wheezes  Abd-soft, NT, ND, +BS  Ext-no edema, left  foot under dressing not removed for examination today  Neuro-A&Ox3, no focal deficits  Flat mood, not much interested in conversation   Skin, no new rashes over last 24 hours    Medications    Current Facility-Administered Medications:   •  !Vancomycin trough at 2130 on 3/2, , Does not apply, Once, Juancho Kathleen [facility]  •  acetaminophen (TYLENOL) tablet 650 mg, 650 mg, Oral, Q4H PRN, Melisa Boles MD, 650 mg at 03/01/17 2119  •  aspirin EC tablet 81 mg, 81 mg, Oral, Daily, Melisa Boles MD, 81 mg at 03/02/17 0950  •  dextrose (D50W) solution 25 g, 25 g, Intravenous, Q15 Min PRN, Melisa Boles MD  •  dextrose (GLUTOSE) oral gel 15 g, 15 g, Oral, Q15 Min PRN, Melisa Boles MD  •  docusate sodium (COLACE) capsule 100 mg, 100 mg, Oral, BID, Melisa Boles MD, 100 mg at 02/28/17 2008  •  famotidine (PEPCID) tablet 20 mg, 20 mg, Oral, BID, Melisa Boles MD, 20 mg at 03/02/17 0950  •  gabapentin (NEURONTIN) capsule 1,200 mg, 1,200 mg, Oral, Q8H, Melisa Boles MD, 1,200 mg at 03/02/17 1353  •  glucagon (GLUCAGEN) injection 1 mg, 1 mg, Subcutaneous, Q15 Min PRN, Melisa Boles MD  •  HYDROcodone-acetaminophen (NORCO)  MG per tablet 1 tablet, 1 tablet, Oral, Q6H PRN, Unruly Lopez MD, 1 tablet at 03/02/17 0843  •  insulin detemir (LEVEMIR) injection 30 Units, 30 Units, Subcutaneous, Nightly, Casie M Mayne, PA, 30 Units at 03/01/17 2217  •  insulin lispro (humaLOG) injection 0-9 Units, 0-9 Units, Subcutaneous, 4x Daily With Meals & Nightly, Melisa Boles MD, 2 Units at 03/02/17 0839  •  losartan (COZAAR) tablet 100 mg, 100 mg, Oral, Q24H, Melisa Bolse MD, 100 mg at 03/02/17 0950  •  Morphine sulfate (PF) injection 1 mg, 1 mg, Intravenous, Q4H PRN **AND** naloxone  (NARCAN) injection 0.4 mg, 0.4 mg, Intravenous, Q5 Min PRN, Melisa Boles MD  •  ondansetron (ZOFRAN) injection 4 mg, 4 mg, Intravenous, Q6H PRN, Melisa Boles MD, 4 mg at 03/01/17 0944  •  Pharmacy to dose vancomycin, , Does not apply, Continuous PRN, Usman Dong MD  •  piperacillin-tazobactam (ZOSYN) 4.5 g/100 mL 0.9% NS IVPB (mbp), 4.5 g, Intravenous, Q6H, Usman Dong MD, 4.5 g at 03/02/17 1222  •  polyethylene glycol (MIRALAX) powder 17 g, 17 g, Oral, Daily, Melisa Boles MD, 17 g at 02/28/17 2008  •  probiotic (CULTURELLE) capsule 1 capsule, 1 capsule, Oral, Daily, Melisa Boles MD, 1 capsule at 03/02/17 0950  •  sodium chloride 0.9 % flush 1-10 mL, 1-10 mL, Intravenous, PRN, Melisa Boles MD  •  sodium chloride 0.9 % flush 10 mL, 10 mL, Intravenous, PRN, Carlos Brothers MD  •  sodium chloride 0.9 % infusion, 100 mL/hr, Intravenous, Continuous, Melisa Boles MD, Last Rate: 100 mL/hr at 03/02/17 0437, 100 mL/hr at 03/02/17 0437  •  vancomycin IVPB 1750 mg in 0.9% Sodium Chloride (premix) 500 mL, 1,750 mg, Intravenous, Q12H, Juancho Kathleen [facility], 1,750 mg at 03/02/17 0953  I have reviewed the labs, culture data, radiology results, and diagnostic studies.    Results from last 7 days  Lab Units 03/02/17  0908 03/01/17  0710 02/28/17  1553   SODIUM mmol/L 133 132 129*   POTASSIUM mmol/L 3.7 3.8 4.3   CHLORIDE mmol/L 102 98* 92*   TOTAL CO2 mmol/L 26.0 25.0 26.0   BUN mg/dL 28* 19 19   CREATININE mg/dL 0.80 0.60 0.80   CALCIUM mg/dL 7.7* 9.2 9.8   GLUCOSE mg/dL 162* 152* 255*       Results from last 7 days  Lab Units 03/02/17  0813 03/01/17  0710 02/28/17  1553   WBC 10*3/mm3 12.24* 37.74* 18.47*   HEMOGLOBIN g/dL 10.8* 11.3* 12.8*   HEMATOCRIT % 32.8* 35.0* 38.2*   PLATELETS 10*3/mm3 272 362 499*           Culture Data:    Radiology Results:  Imaging Results (last 24 hours)     Procedure Component Value Units Date/Time    XR Foot 3+ View Left [[id number]] Collected:  03/01/17 1036     Updated:  03/01/17 1057     Narrative:       EXAMINATION: XR FOOT 3+ VW LEFT-      INDICATION: Osteomyelitis.      COMPARISON: Compared to MR datasets 01/25/2017.     FINDINGS:   1. The third, fourth and fifth digits of the left foot have been  resected from the metatarsals distally.  2. There is rarefaction and osteoporosis with cortical mineral loss of  the head of the second metatarsal, however, active focal erosion is not  identified.  3. There is degenerative disease in the midfoot structures without  erosion. Hindfoot structures are unremarkable and the left lower leg and  ankle are unremarkable.           Impression:       1. Status post amputation third, fourth and fifth digits or raise of the  left foot involving the third, fourth and fifth metatarsals and toes.  2. Active bone erosion, fracture or destructive process is not  identified from a conventional image standpoint currently.     D:  02/28/2017  E:  03/01/2017     This report was finalized on 3/1/2017 10:55 AM by Dr. Shane Villa MD.           *. Please note that portions of this note were completed with a voice recognition program. Efforts were made to edit the dictations, but occasionally words are mistranscribed.  Unruly Lopez MD03/02/172:06 PM

## 2017-03-02 NOTE — PLAN OF CARE
Problem: Patient Care Overview (Adult)  Goal: Plan of Care Review  Outcome: Ongoing (interventions implemented as appropriate)    03/02/17 0248   Coping/Psychosocial Response Interventions   Plan Of Care Reviewed With patient   Patient Care Overview   Progress declining         Problem: Infection, Risk/Actual (Adult)  Goal: Identify Related Risk Factors and Signs and Symptoms  Outcome: Ongoing (interventions implemented as appropriate)

## 2017-03-02 NOTE — PROGRESS NOTES
Cardiothoracic Surgery Progress Note         LOS: 2 days      Subjective:  No acute events overnight    Objective:  Vital Signs  Temp:  [97.1 °F (36.2 °C)-103.2 °F (39.6 °C)] 97.5 °F (36.4 °C)  Heart Rate:  [107-117] 107  Resp:  [18-20] 18  BP: ()/(61-92) 95/61    Physical Exam:   General Appearance: Sleeping but arouses to noise, appears stated age and cooperative   Lungs: clear to auscultation, respirations regular, respirations even and respirations unlabored   Heart: regular rhythm & normal rate, normal S1, S2 and no murmur, no clau, no rub   Skin: Left foot with dry eschar to distal aspect of wound.  Purulent drainage from proximal aspect of wound.       Results:    Results from last 7 days  Lab Units 03/01/17  0710   WBC 10*3/mm3 37.74*   HEMOGLOBIN g/dL 11.3*   HEMATOCRIT % 35.0*   PLATELETS 10*3/mm3 362       Results from last 7 days  Lab Units 03/01/17  0710   SODIUM mmol/L 132   POTASSIUM mmol/L 3.8   CHLORIDE mmol/L 98*   TOTAL CO2 mmol/L 25.0   BUN mg/dL 19   CREATININE mg/dL 0.60   GLUCOSE mg/dL 152*   CALCIUM mg/dL 9.2         Assessment:  Uncontrolled diabetes and left foot ulcer status post 3-5 TMA.  Non-compliant with ABX and wound care after amputation  Poorly controlled DM II  Cirrhosis  Noncompliance    Plan:  Difficult problem with patient non-compliance and obvious psychiatric issues  In a compliant patient, this wound would heal over time  Continue local wound care  Abx per ID  IZABEL GOMEZ with Dr Martinez today

## 2017-03-02 NOTE — PLAN OF CARE
Problem: Perioperative Period (Adult)  Intervention: Monitor/Manage Pain    03/02/17 3668   Safety Interventions   Medication Review/Management medications reviewed

## 2017-03-02 NOTE — ANESTHESIA PREPROCEDURE EVALUATION
Anesthesia Evaluation        Airway   Mallampati: II  TM distance: >3 FB  Neck ROM: full  no difficulty expected  Dental    (+) poor dentation        Pulmonary    (-) asthma, decreased breath sounds, not a smoker  Cardiovascular     ECG reviewed  Rhythm: regular  Rate: normal    (-) angina, murmur, cardiac stents, CABG      Neuro/Psych  (-) seizures, TIA, CVA  GI/Hepatic/Renal/Endo    (+) obesity,    (-) renal diseaseDiabetes: IDDM.    Musculoskeletal     Abdominal    Substance History      OB/GYN          Other                                    Anesthesia Plan    ASA 3     general and regional   (GA and BLock )  Anesthetic plan and risks discussed with patient.

## 2017-03-02 NOTE — PLAN OF CARE
Problem: Patient Care Overview (Adult)  Goal: Plan of Care Review  Outcome: Ongoing (interventions implemented as appropriate)    03/02/17 1708   Coping/Psychosocial Response Interventions   Plan Of Care Reviewed With patient   Patient Care Overview   Progress declining         Problem: Infection, Risk/Actual (Adult)  Goal: Identify Related Risk Factors and Signs and Symptoms  Outcome: Ongoing (interventions implemented as appropriate)    03/02/17 0248   Infection, Risk/Actual   Infection, Risk/Actual: Related Risk Factors chronic illness/condition;surgery/procedure;tissue perfusion altered;skin integrity impairment   Signs and Symptoms (Infection, Risk/Actual) weakness;malaise;skin cool/clammy;diaphoresis;body temperature changes       Goal: Infection Prevention/Resolution  Outcome: Ongoing (interventions implemented as appropriate)    03/02/17 1708   Infection, Risk/Actual (Adult)   Infection Prevention/Resolution making progress toward outcome

## 2017-03-02 NOTE — PLAN OF CARE
Problem: Perioperative Period (Adult)  Goal: Signs and Symptoms of Listed Potential Problems Will be Absent or Manageable (Perioperative Period)  Outcome: Ongoing (interventions implemented as appropriate)    03/02/17 2194   Perioperative Period   Problems Assessed (Perioperative Period) pain   Problems Present (Perioperative Period) none

## 2017-03-02 NOTE — PROGRESS NOTES
[facility] Progress Note    2/28/2017      Antibiotics:  IV Anti-Infectives     Ordered     Dose/Rate Route Frequency Start Stop    03/01/17 0914  vancomycin IVPB 1750 mg in 0.9% Sodium Chloride (premix) 500 mL     Ordering Provider:  Juancho Kathleen RPH    1,750 mg  over 120 Minutes Intravenous Every 12 Hours 03/01/17 2200      03/01/17 0843  piperacillin-tazobactam (ZOSYN) 4.5 g/100 mL 0.9% NS IVPB (mbp)     Ordering Provider:  Usman Dong MD    4.5 g Intravenous Every 6 Hours 03/01/17 1000      03/01/17 0848  vancomycin 2000 mg/500 mL 0.9% NS IVPB ([facility])     Ordering Provider:  Juancho Kathleen RPH    2,000 mg  over 120 Minutes Intravenous Once 03/01/17 0930 03/01/17 1115    03/01/17 0843  Pharmacy to dose vancomycin     Ordering Provider:  Usman Dong MD     Does not apply Continuous PRN 03/01/17 0841      02/28/17 1753  DAPTOmycin (CUBICIN) 450 mg in sodium chloride 0.9 % 50 mL IVPB     Benito Allison [facility] reviewed the order on 02/28/17 1936.   Ordering Provider:  Usman Dong MD    450 mg  100 mL/hr over 30 Minutes Intravenous Every 24 Hours 02/28/17 1800 02/28/17 2040    02/28/17 1753  cefTRIAXone (ROCEPHIN) IVPB 1 g     Ordering Provider:  Carlos Brothers MD    1 g  over 30 Minutes Intravenous Once 02/28/17 1755 02/28/17 1842          CC:sepsis, bacteremia, foot osteo, noncompliance    HPI:  Patient is a 46 y.o. Yr old male with history of uncontrolled diabetes with extensive comorbidity as previously outlined. He has lower extremity vascular disease but no specific option for revascularization per discussion with Dr. Martinez. He was admitted January 2017 with left foot infection, MRI not confirming osteomyelitis, but had surgery on January 30 with metatarsals 3/4/5 amputated, marginal perfusion per operative note and MRSA/GB strep/coag-negative staph in his various cultures. He was transferred to [facility] on broad-spectrum IV antibiotics. While at [facility], he was under the care of   Javier and, per family/patient, he was told that he required hyperbaric oxygen therapy and had very little chance for long-term healing at the foot. He was readmitted on February 13 to [facility] with patient initially interested in having higher level amputation. After discussion with Dr. Martinez, he changed his mind and wanted attempt at salvage again with IV antibiotics/wound care. He was discharged February 17 as outlined in our prior inpatient notes. He was noncompliant with follow-up in the office the week of February 20 despite our calls to the patient. He then stopped antibiotics on his own Wednesday, February 22 and did not notify us. His family was able to get him to come into the office on February 27 at which point he refused IV antibiotics/insisted PICC line be removed and he refused hospitalization/VAC,  understanding the risks of his behavior/decisions as outlined in my office note. He was agreeable for prescription of oral agents and went home to consider his options. On February 28, he called and said he was willing to come to the hospital. He presented to the emergency room, found to have fever/leukocytosis consistent with sepsis and subsequently found to be bacteremic with MRSA.      3/2/17 He denies headache photophobia or neck stiffness. No chest pain palpitation or syncope. He denies shortness of breath cough or hemoptysis. No skin rash. No dysuria hematuria or pyuria. Left foot has remained blackened on the medial side with same vague edema/redness that he describes as not progressive and 24 hours.Collins fever since admit, some less this am.    ROS:      3/2/17 No n/v/d. No rash. No new ADR to Abx.     Constitutional-- appetitie decreased and generalized malaise and fatigue  Heent-- No new vision, hearing or throat complaints.  No epistaxis or oral sores.  Denies odynophagia or dysphagia.  No flashers, floaters or eye pain. No odynophagia or dysphagia. No headache, photophobia or neck  "stiffness.  CV-- No chest pain, palpitation or syncope  Resp-- No SOB/cough/Hemoptysis  GI- No nausea, vomiting, or diarrhea.  No hematochezia, melena, or hematemesis. Denies jaundice or chronic liver disease.  -- No dysuria, hematuria, or flank pain.  Denies hesitancy, urgency or flank pain.  Lymph- no swollen lymph nodes in neck/axilla or groin.   Heme- No active bruising or bleeding; no Hx of DVT or PE.  MS-- no swelling or pain in the bones or joints of arms/legs aside from above.  No new back pain.  Neuro-- No acute focal weakness or numbness in the arms or legs.  No seizures.    Full 12 point review of systems reviewed and negative otherwise for acute complaints,       PE:   Visit Vitals   • /75 (BP Location: Right arm, Patient Position: Lying)   • Pulse 96   • Temp 98.4 °F (36.9 °C) (Oral)   • Resp 18   • Ht 75\" (190.5 cm)   • Wt 250 lb (113 kg)   • SpO2 92%   • BMI 31.25 kg/m2       GENERAL: Awake and alert, in no acute distress.   HEENT: Normocephalic, atraumatic.  PERRL. EOMI. No conjunctival injection. No icterus. Oropharynx clear without evidence of thrush or exudate. No evidence of peridontal disease.    NECK: Supple without nuchal rigidity. No mass.  LYMPH: No cervical, axillary or inguinal lymphadenopathy.  HEART: RRR; No murmur, rubs, gallops.   LUNGS: Clear to auscultation bilaterally without wheezing, rales, rhonchi. Normal respiratory effort. Nonlabored. No dullness.  ABDOMEN: Soft, nontender, nondistended. Positive bowel sounds. No rebound or guarding. NO mass or HSM.  EXT:  No cyanosis, clubbing or edema. No cord.  : Genitalia generally unremarkable.  Without Walker catheter.  MSK: FROM without joint effusions noted arms/legs.    SKIN: Warm and dry without cutaneous eruptions on Inspection/palpation aside from below.    NEURO: Oriented to PPT. No focal deficits on motor/sensory exam at arms/legs.    Left foot with gangrene/black tissue on the medial side with purulence and vague " surrounding erythema/edema. No discrete mass bulge or fluctuance. No crepitus or bulla     No peripheral stigmata/phenomena of endocarditis    Laboratory Data      Results from last 7 days  Lab Units 03/02/17  0813 03/01/17  0710 02/28/17  1553   WBC 10*3/mm3 12.24* 37.74* 18.47*   HEMOGLOBIN g/dL 10.8* 11.3* 12.8*   HEMATOCRIT % 32.8* 35.0* 38.2*   PLATELETS 10*3/mm3 272 362 499*       Results from last 7 days  Lab Units 03/02/17  0908   SODIUM mmol/L 133   POTASSIUM mmol/L 3.7   CHLORIDE mmol/L 102   TOTAL CO2 mmol/L 26.0   BUN mg/dL 28*   CREATININE mg/dL 0.80   GLUCOSE mg/dL 162*   CALCIUM mg/dL 7.7*           Results from last 7 days  Lab Units 02/28/17  1553   SED RATE mm/hr 127*       Results from last 7 days  Lab Units 02/28/17  1553   CRP mg/dL 135.10*       Estimated Creatinine Clearance: 153.2 mL/min (by C-G formula based on Cr of 0.8).      Microbiology:      Radiology:  Imaging Results (last 72 hours)     Procedure Component Value Units Date/Time    XR Foot 3+ View Left [[id number]] Collected:  03/01/17 1036     Updated:  03/01/17 1057    Narrative:       EXAMINATION: XR FOOT 3+ VW LEFT-      INDICATION: Osteomyelitis.      COMPARISON: Compared to MR datasets 01/25/2017.     FINDINGS:   1. The third, fourth and fifth digits of the left foot have been  resected from the metatarsals distally.  2. There is rarefaction and osteoporosis with cortical mineral loss of  the head of the second metatarsal, however, active focal erosion is not  identified.  3. There is degenerative disease in the midfoot structures without  erosion. Hindfoot structures are unremarkable and the left lower leg and  ankle are unremarkable.           Impression:       1. Status post amputation third, fourth and fifth digits or raise of the  left foot involving the third, fourth and fifth metatarsals and toes.  2. Active bone erosion, fracture or destructive process is not  identified from a conventional image standpoint currently.      D:  02/28/2017  E:  03/01/2017     This report was finalized on 3/1/2017 10:55 AM by Dr. Shane Villa MD.               Impression:   --MRSA bacteremia/sepsis. Likely associated with his left foot osteomyelitis/infection, with general progression of illness likely as a direct result of his medical noncompliance and recent refusal of care. He and his family understand the implication of his recent behavior. They're now agreeable to treatment including surgery/IV antibiotics. He understands potential further serious morbidity and other serious sequela/mortality including metastatic foci of involvement, risk for endovascular involvement/endocarditis and that antibiotics/surgery are not a guarantee for cure. In light of recent daptomycin exposure, we will use vancomycin for the MRSA until further EYAD data known. Risk for possible evolving daptomycin resistance on daptomycin.     --Left foot gangrene/osteomyelitis with progression as above. He is now agreeable to surgery and I have discussed directly with Dr. Martinez. Option/timing/threshold per Dr. Martinez, although I did discuss with Dr. Martinez the positive blood culture/septic presentation and that in general would be worthwhile to pursue surgery ASAP.  Broad mixed coverage with vancomycin/Zosyn.     --Medical noncompliance as above     --Diabetes type 2. You need to tightly control blood sugar to give best chance for healing     --Peripheral vascular disease as previously outlined     --Heart murmur by exam associated with the MRSA bacteremia. He needs to have SHMUEL to evaluate for possible endocarditis.  TTE no vegetation described by cardio    PLAN:    --IV vancomycin/Zosyn     --I discussed potential risks and benefits of the prescribed antibiotics that include, but are not limited to, solid organ toxicity, neuro/bala/vestibular toxicity, renal toxicity, CDiff, cytopenias, hypersensitivity, etc.. Patient/Family voice understanding and agree to  proceed.     --Monitor IV and IV antibiotics with risk for systemic complication and potential drug interaction     --Check/review labs cultures and scans     --Highly complex set of issues with high risk for further serious morbidity and other serious sequela     --Discussed with patient's family, Dr. Martinez, and Dr. Lopez, and microbiology.     --Dr. Martinez to arrange SHMUEL    --surgery today    Usman Dong MD  3/2/2017

## 2017-03-03 LAB
BACTERIA SPEC AEROBE CULT: ABNORMAL
GLUCOSE BLDC GLUCOMTR-MCNC: 202 MG/DL (ref 70–130)
GLUCOSE BLDC GLUCOMTR-MCNC: 202 MG/DL (ref 70–130)
GLUCOSE BLDC GLUCOMTR-MCNC: 210 MG/DL (ref 70–130)
GLUCOSE BLDC GLUCOMTR-MCNC: 241 MG/DL (ref 70–130)
GLUCOSE BLDC GLUCOMTR-MCNC: 279 MG/DL (ref 70–130)
GRAM STN SPEC: ABNORMAL
GRAM STN SPEC: ABNORMAL
GRAM STN SPEC: NORMAL
GRAM STN SPEC: NORMAL
ISOLATED FROM: ABNORMAL

## 2017-03-03 PROCEDURE — 99232 SBSQ HOSP IP/OBS MODERATE 35: CPT | Performed by: FAMILY MEDICINE

## 2017-03-03 PROCEDURE — 25010000002 DAPTOMYCIN PER 1 MG: Performed by: INTERNAL MEDICINE

## 2017-03-03 PROCEDURE — 25010000002 VANCOMYCIN 1750 MG/500ML SOLUTION

## 2017-03-03 PROCEDURE — 25010000002 MORPHINE SULFATE (PF) 2 MG/ML SOLUTION: Performed by: FAMILY MEDICINE

## 2017-03-03 PROCEDURE — 25010000002 PIPERACILLIN-TAZOBACTAM: Performed by: INTERNAL MEDICINE

## 2017-03-03 PROCEDURE — 82962 GLUCOSE BLOOD TEST: CPT

## 2017-03-03 PROCEDURE — 63710000001 INSULIN DETEMIR PER 5 UNITS: Performed by: PHYSICIAN ASSISTANT

## 2017-03-03 PROCEDURE — 87040 BLOOD CULTURE FOR BACTERIA: CPT | Performed by: INTERNAL MEDICINE

## 2017-03-03 RX ORDER — DEXAMETHASONE SODIUM PHOSPHATE 4 MG/ML
INJECTION, SOLUTION INTRA-ARTICULAR; INTRALESIONAL; INTRAMUSCULAR; INTRAVENOUS; SOFT TISSUE AS NEEDED
Status: DISCONTINUED | OUTPATIENT
Start: 2017-03-02 | End: 2017-03-03 | Stop reason: SURG

## 2017-03-03 RX ORDER — BUPRENORPHINE HYDROCHLORIDE 0.32 MG/ML
INJECTION INTRAMUSCULAR; INTRAVENOUS AS NEEDED
Status: DISCONTINUED | OUTPATIENT
Start: 2017-03-02 | End: 2017-03-03 | Stop reason: SURG

## 2017-03-03 RX ORDER — BUPIVACAINE HYDROCHLORIDE 2.5 MG/ML
INJECTION, SOLUTION EPIDURAL; INFILTRATION; INTRACAUDAL AS NEEDED
Status: DISCONTINUED | OUTPATIENT
Start: 2017-03-02 | End: 2017-03-03 | Stop reason: SURG

## 2017-03-03 RX ADMIN — FAMOTIDINE 20 MG: 20 TABLET ORAL at 17:11

## 2017-03-03 RX ADMIN — TAZOBACTAM SODIUM AND PIPERACILLIN SODIUM 4.5 G: .5; 4 INJECTION, POWDER, LYOPHILIZED, FOR SOLUTION INTRAVENOUS at 21:12

## 2017-03-03 RX ADMIN — Medication 1750 MG: at 09:58

## 2017-03-03 RX ADMIN — TAZOBACTAM SODIUM AND PIPERACILLIN SODIUM 4.5 G: .5; 4 INJECTION, POWDER, LYOPHILIZED, FOR SOLUTION INTRAVENOUS at 17:11

## 2017-03-03 RX ADMIN — MORPHINE SULFATE 1 MG: 2 INJECTION, SOLUTION INTRAMUSCULAR; INTRAVENOUS at 21:10

## 2017-03-03 RX ADMIN — HYDROCODONE BITARTRATE AND ACETAMINOPHEN 1 TABLET: 10; 325 TABLET ORAL at 17:11

## 2017-03-03 RX ADMIN — GABAPENTIN 1200 MG: 300 CAPSULE ORAL at 20:58

## 2017-03-03 RX ADMIN — INSULIN LISPRO 4 UNITS: 100 INJECTION, SOLUTION INTRAVENOUS; SUBCUTANEOUS at 17:11

## 2017-03-03 RX ADMIN — INSULIN LISPRO 4 UNITS: 100 INJECTION, SOLUTION INTRAVENOUS; SUBCUTANEOUS at 20:59

## 2017-03-03 RX ADMIN — Medication 1750 MG: at 03:19

## 2017-03-03 RX ADMIN — TAZOBACTAM SODIUM AND PIPERACILLIN SODIUM 4.5 G: .5; 4 INJECTION, POWDER, LYOPHILIZED, FOR SOLUTION INTRAVENOUS at 08:58

## 2017-03-03 RX ADMIN — INSULIN LISPRO 4 UNITS: 100 INJECTION, SOLUTION INTRAVENOUS; SUBCUTANEOUS at 08:18

## 2017-03-03 RX ADMIN — FAMOTIDINE 20 MG: 20 TABLET ORAL at 08:17

## 2017-03-03 RX ADMIN — TAZOBACTAM SODIUM AND PIPERACILLIN SODIUM 4.5 G: .5; 4 INJECTION, POWDER, LYOPHILIZED, FOR SOLUTION INTRAVENOUS at 05:53

## 2017-03-03 RX ADMIN — GABAPENTIN 1200 MG: 300 CAPSULE ORAL at 13:24

## 2017-03-03 RX ADMIN — LOSARTAN POTASSIUM 100 MG: 50 TABLET, FILM COATED ORAL at 08:58

## 2017-03-03 RX ADMIN — ASPIRIN 81 MG: 81 TABLET, COATED ORAL at 08:17

## 2017-03-03 RX ADMIN — DOCUSATE SODIUM 100 MG: 100 CAPSULE, LIQUID FILLED ORAL at 08:17

## 2017-03-03 RX ADMIN — INSULIN LISPRO 6 UNITS: 100 INJECTION, SOLUTION INTRAVENOUS; SUBCUTANEOUS at 13:24

## 2017-03-03 RX ADMIN — INSULIN DETEMIR 30 UNITS: 100 INJECTION, SOLUTION SUBCUTANEOUS at 21:00

## 2017-03-03 RX ADMIN — HYDROCODONE BITARTRATE AND ACETAMINOPHEN 1 TABLET: 10; 325 TABLET ORAL at 10:11

## 2017-03-03 RX ADMIN — SODIUM CHLORIDE 100 ML/HR: 900 INJECTION INTRAVENOUS at 21:11

## 2017-03-03 RX ADMIN — Medication: at 04:23

## 2017-03-03 RX ADMIN — DAPTOMYCIN 700 MG: 500 INJECTION, POWDER, LYOPHILIZED, FOR SOLUTION INTRAVENOUS at 13:30

## 2017-03-03 RX ADMIN — GABAPENTIN 1200 MG: 300 CAPSULE ORAL at 06:03

## 2017-03-03 RX ADMIN — DOCUSATE SODIUM 100 MG: 100 CAPSULE, LIQUID FILLED ORAL at 17:11

## 2017-03-03 RX ADMIN — Medication 1 CAPSULE: at 08:17

## 2017-03-03 NOTE — PROGRESS NOTES
Cardiothoracic Surgery Progress Note      POD # 1 s/p L BKA       LOS: 3 days      Subjective:  Pain controlled.    Objective:  Vital Signs  Temp:  [97.8 °F (36.6 °C)-102.4 °F (39.1 °C)] 97.8 °F (36.6 °C)  Heart Rate:  [] 99  Resp:  [18-24] 18  BP: (110-156)/(73-97) 156/97    Physical Exam:   General Appearance: alert, appears stated age and cooperative   Lungs: clear to auscultation, respirations regular, respirations even and respirations unlabored   Heart: regular rhythm & normal rate, normal S1, S2 and no murmur, no clau, no rub   Skin: Bandage c/d/i     Results:    Results from last 7 days  Lab Units 03/02/17  0813   WBC 10*3/mm3 12.24*   HEMOGLOBIN g/dL 10.8*   HEMATOCRIT % 32.8*   PLATELETS 10*3/mm3 272       Results from last 7 days  Lab Units 03/02/17  0908   SODIUM mmol/L 133   POTASSIUM mmol/L 3.7   CHLORIDE mmol/L 102   TOTAL CO2 mmol/L 26.0   BUN mg/dL 28*   CREATININE mg/dL 0.80   GLUCOSE mg/dL 162*   CALCIUM mg/dL 7.7*         Assessment:  POD # 1 s/p L BKA, expected recovery    Plan:  ABX as per ID  Patient ok with starting antidepressant, will defer to hospitalists  PT/OT daily  Dispo planning for rehab Monday    Juancho Martinez MD  03/03/17  8:10 AM

## 2017-03-03 NOTE — PROGRESS NOTES
Continued Stay Note  [facility]     Patient Name: Kendrick Crystal  MRN: [id number]  Today's Date: 3/3/2017    Admit Date: 2/28/2017          Discharge Plan       03/03/17 0928    Case Management/Social Work Plan    Plan [facility]    Patient/Family In Agreement With Plan yes    Additional Comments Discussed STR options with pt and wife.  Pt will only qualify for acute rehab with Medicaid.  I have called referral to [place] at [facility].  Will need insurance pre-cert (will likely not receive this until Monday).  CM will cont to follow.              Discharge Codes     None        Expected Discharge Date and Time     Expected Discharge Date Expected Discharge Time    Mar 6, 2017             Christelle Cuevas

## 2017-03-03 NOTE — ANESTHESIA PROCEDURE NOTES
Peripheral Block    Patient location during procedure: pre-op  Reason for block: at surgeon's request and post-op pain management  Performed by  Anesthesiologist: DHIRAJ OLVERA  Preanesthetic Checklist  Completed: patient identified, site marked, surgical consent, pre-op evaluation, timeout performed, IV checked, risks and benefits discussed and monitors and equipment checked  Peripheral Block Prep:  Sterile barriers:cap, gloves, mask and sterile barriers  Prep: ChloraPrep  Patient monitoring: blood pressure monitoring, continuous pulse oximetry and EKG  Peripheral Procedure  Sedation:no  Guidance:ultrasound guided  Images:still images obtained    Block Type:popliteal  Injection Technique:single-shot  Needle Type:short-bevel  Needle Gauge:20 G  ULTRASOUND INTERPRETATION.  Using ultrasound guidance a 20 G gauge needle was placed in close proximity to the sciatic nerve, at which point, under ultrasound guidance anesthetic was injected in the area of the nerve and spread of the anesthesia was seen on ultrasound in close proximity thereto.  There were no abnormalities seen on ultrasound; a digital image was taken; and the patient tolerated the procedure with no complications.   Medications  Comment:Added 300 mcg bupenorphine, 4 mg Dexamethasone   Local Injected:bupivacaine 0.25% Local Amount Injected:30 (mls)mL  Post Assessment  Injection Assessment: negative aspiration for heme, no paresthesia on injection and incremental injection  Patient Tolerance:comfortable throughout block  Complications:no  Additional Notes  Procedure:                                                             Analgesia was achieved with 1% Lidocaine 2 ml for infiltration of skin      The pt was placed in  lateral position.  The Insertion site was  prepped and Draped in sterile fashion.  The pt was anesthetized with  IV Sedation( see meds).  Skin and cutaneous tissue was infiltrated and anesthetized with 1% Lidocaine via a 25g needle.  A  BBraun 4 inch 18g echogenic needle was then  inserted approximately 3 cm proximal to the popliteal keiko a at the lateral mid biceps femoris and advanced In-plane with Ultrasound guidance.  Normal Saline PSF was utilized for hydrodissection of tissue.  The popliteal artery was visualized and the common peroneal and tibial bifurcation was located.  LA injection spread was visualized, injection was incremental 1-5ml, injection pressure was normal or little, no intraneural injection, no vascular injection.  .  A BBraun 20g wire stylet catheter was placed via the needle with ultrasound visualization and confirmation with NS fluid bolus. The labeled Catheter was then secured at insertions site with skin afix,  mastisol, steristrips  and a CHG transparent dressing was placed over. Thank you

## 2017-03-03 NOTE — PROGRESS NOTES
[facility] Progress Note    2/28/2017      Antibiotics:  IV Anti-Infectives     Ordered     Dose/Rate Route Frequency Start Stop    03/03/17 1055  DAPTOmycin (CUBICIN) 680 mg in sodium chloride 0.9 % 50 mL IVPB     Ordering Provider:  Usman Dong MD    6 mg/kg × 113 kg  100 mL/hr over 30 Minutes Intravenous Every 24 Hours 03/03/17 1130      03/01/17 0843  piperacillin-tazobactam (ZOSYN) 4.5 g/100 mL 0.9% NS IVPB (mbp)     Ordering Provider:  Usman Dong MD    4.5 g Intravenous Every 6 Hours 03/01/17 1000      03/01/17 0848  vancomycin 2000 mg/500 mL 0.9% NS IVPB ([facility])     Ordering Provider:  Juancho Kathleen RPH    2,000 mg  over 120 Minutes Intravenous Once 03/01/17 0930 03/01/17 1115    03/01/17 0843  Pharmacy to dose vancomycin     Ordering Provider:  Usman Dong MD     Does not apply Continuous PRN 03/01/17 0841      02/28/17 1753  DAPTOmycin (CUBICIN) 450 mg in sodium chloride 0.9 % 50 mL IVPB     [facility] reviewed the order on 02/28/17 1936.   Ordering Provider:  Usman Dong MD    450 mg  100 mL/hr over 30 Minutes Intravenous Every 24 Hours 02/28/17 1800 02/28/17 2040    02/28/17 1753  cefTRIAXone (ROCEPHIN) IVPB 1 g     Ordering Provider:  Carlos Brothers MD    1 g  over 30 Minutes Intravenous Once 02/28/17 1755 02/28/17 1842          CC:sepsis, bacteremia, foot osteo, noncompliance    HPI:  Patient is a 46 y.o. Yr old male with history of uncontrolled diabetes with extensive comorbidity as previously outlined. He has lower extremity vascular disease but no specific option for revascularization per discussion with Dr. Martinez. He was admitted January 2017 with left foot infection, MRI not confirming osteomyelitis, but had surgery on January 30 with metatarsals 3/4/5 amputated, marginal perfusion per operative note and MRSA/GB strep/coag-negative staph in his various cultures. He was transferred to [facility] on broad-spectrum IV antibiotics. While at [facility], he was  under the care of Dr. Herrera and, per family/patient, he was told that he required hyperbaric oxygen therapy and had very little chance for long-term healing at the foot. He was readmitted on February 13 to [facility] with patient initially interested in having higher level amputation. After discussion with Dr. Martinez, he changed his mind and wanted attempt at salvage again with IV antibiotics/wound care. He was discharged February 17 as outlined in our prior inpatient notes. He was noncompliant with follow-up in the office the week of February 20 despite our calls to the patient. He then stopped antibiotics on his own Wednesday, February 22 and did not notify us. His family was able to get him to come into the office on February 27 at which point he refused IV antibiotics/insisted PICC line be removed and he refused hospitalization/VAC,  understanding the risks of his behavior/decisions as outlined in my office note. He was agreeable for prescription of oral agents and went home to consider his options. On February 28, he called and said he was willing to come to the hospital. He presented to the emergency room, found to have fever/leukocytosis consistent with sepsis and subsequently found to be bacteremic with MRSA.      3/3/17 He denies headache photophobia or neck stiffness. No chest pain palpitation or syncope. He denies shortness of breath cough or hemoptysis. No skin rash. No dysuria hematuria or pyuria.s/p Left BKA.  Fever persisted last 24 hours    ROS:      3/3/17 No n/v/d. No rash. No new ADR to Abx.     Constitutional-- appetitie decreased and generalized malaise and fatigue  Heent-- No new vision, hearing or throat complaints.  No epistaxis or oral sores.  Denies odynophagia or dysphagia.  No flashers, floaters or eye pain. No odynophagia or dysphagia. No headache, photophobia or neck stiffness.  CV-- No chest pain, palpitation or syncope  Resp-- No SOB/cough/Hemoptysis  GI- No nausea, vomiting, or  "diarrhea.  No hematochezia, melena, or hematemesis. Denies jaundice or chronic liver disease.  -- No dysuria, hematuria, or flank pain.  Denies hesitancy, urgency or flank pain.  Lymph- no swollen lymph nodes in neck/axilla or groin.   Heme- No active bruising or bleeding; no Hx of DVT or PE.  MS-- no swelling or pain in the bones or joints of arms/legs aside from above.  No new back pain.  Neuro-- No acute focal weakness or numbness in the arms or legs.  No seizures.    Full 12 point review of systems reviewed and negative otherwise for acute complaints,       PE:   Visit Vitals   • /97 (BP Location: Right arm, Patient Position: Lying)   • Pulse 99   • Temp 97.8 °F (36.6 °C) (Oral)   • Resp 18   • Ht 75\" (190.5 cm)   • Wt 250 lb (113 kg)   • SpO2 94%   • BMI 31.25 kg/m2       GENERAL: Awake and alert, in no acute distress.   HEENT: Normocephalic, atraumatic.  PERRL. EOMI. No conjunctival injection. No icterus. Oropharynx clear without evidence of thrush or exudate. No evidence of peridontal disease.    NECK: Supple without nuchal rigidity. No mass.  LYMPH: No cervical, axillary or inguinal lymphadenopathy.  HEART: RRR; No murmur, rubs, gallops.   LUNGS: Clear to auscultation bilaterally without wheezing, rales, rhonchi. Normal respiratory effort. Nonlabored. No dullness.  ABDOMEN: Soft, nontender, nondistended. Positive bowel sounds. No rebound or guarding. NO mass or HSM.  EXT:  No cyanosis, clubbing or edema. No cord.  : Genitalia generally unremarkable.  Without Walker catheter.  MSK: FROM without joint effusions noted arms/legs.    SKIN: Warm and dry without cutaneous eruptions on Inspection/palpation aside from below.    NEURO: Oriented to PPT. No focal deficits on motor/sensory exam at arms/legs.    Left BKA noted     No peripheral stigmata/phenomena of endocarditis    Laboratory Data      Results from last 7 days  Lab Units 03/02/17  0813 03/01/17  0710 02/28/17  1553   WBC 10*3/mm3 12.24* 37.74* " 18.47*   HEMOGLOBIN g/dL 10.8* 11.3* 12.8*   HEMATOCRIT % 32.8* 35.0* 38.2*   PLATELETS 10*3/mm3 272 362 499*       Results from last 7 days  Lab Units 03/02/17  0908   SODIUM mmol/L 133   POTASSIUM mmol/L 3.7   CHLORIDE mmol/L 102   TOTAL CO2 mmol/L 26.0   BUN mg/dL 28*   CREATININE mg/dL 0.80   GLUCOSE mg/dL 162*   CALCIUM mg/dL 7.7*           Results from last 7 days  Lab Units 02/28/17  1553   SED RATE mm/hr 127*       Results from last 7 days  Lab Units 02/28/17  1553   CRP mg/dL 135.10*       Estimated Creatinine Clearance: 153.2 mL/min (by C-G formula based on Cr of 0.8).      Microbiology:      Radiology:  Imaging Results (last 72 hours)     Procedure Component Value Units Date/Time    XR Foot 3+ View Left [[id number]] Collected:  03/01/17 1036     Updated:  03/01/17 1057    Narrative:       EXAMINATION: XR FOOT 3+ VW LEFT-      INDICATION: Osteomyelitis.      COMPARISON: Compared to MR datasets 01/25/2017.     FINDINGS:   1. The third, fourth and fifth digits of the left foot have been  resected from the metatarsals distally.  2. There is rarefaction and osteoporosis with cortical mineral loss of  the head of the second metatarsal, however, active focal erosion is not  identified.  3. There is degenerative disease in the midfoot structures without  erosion. Hindfoot structures are unremarkable and the left lower leg and  ankle are unremarkable.           Impression:       1. Status post amputation third, fourth and fifth digits or raise of the  left foot involving the third, fourth and fifth metatarsals and toes.  2. Active bone erosion, fracture or destructive process is not  identified from a conventional image standpoint currently.     D:  02/28/2017  E:  03/01/2017     This report was finalized on 3/1/2017 10:55 AM by Dr. Shane Villa MD.               Impression:   --MRSA bacteremia/sepsis. Likely associated with his left foot osteomyelitis/infection, with general progression of illness likely as a  direct result of his medical noncompliance and recent refusal of care. He and his family understand the implication of his recent behavior. Dapto DAVION = 1 and vanco davion=2 per micro;  Change to dapto.  Repeat cultures     --Left foot gangrene/osteomyelitis with progression as above. BKA done     --Medical noncompliance as above     --Diabetes type 2. You need to tightly control blood sugar to give best chance for healing     --Peripheral vascular disease as previously outlined     --Heart murmur by exam associated with the MRSA bacteremia. SHMUEL neg for vegetation per Dr Martinez.  TTE no vegetation described by cardio    PLAN:    --IV daptomycin/Zosyn     --I discussed potential risks and benefits of the prescribed antibiotics that include, but are not limited to, solid organ toxicity, neuro/bala/vestibular toxicity, renal toxicity, CDiff, cytopenias, hypersensitivity, pulm/muscle tox etc.. Patient/Family voice understanding and agree to proceed.     --Monitor IV and IV antibiotics with risk for systemic complication and potential drug interaction     --Check/review labs cultures and scans     --Highly complex set of issues with high risk for further serious morbidity and other serious sequela     --Discussed with patient's family, Dr. Martinez, and Dr. Lopez, and microbiology.     --Dr. Martinez reports to me directly SHMUEL neg for vegetation    --surgery 3/2    Usman Dong MD  3/3/2017

## 2017-03-03 NOTE — PROGRESS NOTES
Continued Stay Note  [facility]     Patient Name: Kendrick Crystal  MRN: [id number]  Today's Date: 3/3/2017    Admit Date: 2/28/2017          Discharge Plan       03/03/17 1136    Case Management/Social Work Plan    Patient/Family In Agreement With Plan yes    Additional Comments Pt and wife have also requested a referral to [facility].  I spoke with [facility].  No beds available until next week.  Will fax full referral at that time.  CM will cont to follow.      03/03/17 9453    Case Management/Social Work Plan    Plan [facility]    Patient/Family In Agreement With Plan yes    Additional Comments Discussed STR options with pt and wife.  Pt will only qualify for acute rehab with Medicaid.  I have called referral to [place] at [facility].  Will need insurance pre-cert (will likely not receive this until Monday).  CM will cont to follow.              Discharge Codes     None        Expected Discharge Date and Time     Expected Discharge Date Expected Discharge Time    Mar 6, 2017             Christelle Cuevas

## 2017-03-03 NOTE — ANESTHESIA POSTPROCEDURE EVALUATION
Patient: Kendrick Crystal    Procedure Summary     Date Anesthesia Start Anesthesia Stop Room / Location    03/02/17 1748  [facility] / [facility]       Procedure Diagnosis Surgeon Provider    AMPUTATION BELOW KNEE, SHMUEL (Left Leg Lower) Osteomyelitis of left foot, unspecified chronicity  (Osteomyelitis of left foot, unspecified chronicity [M86.9]) MD Eric Augustine MD          Anesthesia Type: general, regional  Last vitals  BP      Temp     Pulse    Resp      SpO2        Post Anesthesia Care and Evaluation    Patient location during evaluation: PACU  Patient participation: complete - patient participated  Level of consciousness: awake and alert  Pain score: 0  Pain management: adequate  Anesthetic complications: No anesthetic complications  PONV Status: none  Cardiovascular status: acceptable  Respiratory status: acceptable  Hydration status: acceptable    Comments: Extubated awake in the OR,  To RR, report to RN, stable

## 2017-03-03 NOTE — PROGRESS NOTES
[facility] Medicine Services    ASSESSMENT / PLAN             Sepsis, secondary to left foot infection, chronic foot ulcers, s/p recent 3rd-5th left metatarsal amputations by Dr. Martinez 1/30/17 , resolving   POD # 1 s/p L BKA  DUNLAP Cirrhosis  Essential hypertension, stable  Non-compliance  Hyperlipemia  Poorly controlled type 2 diabetes mellitus with peripheral neuropathy.   Lab Results   Component Value Date    HGBA1C 9.70 (H) 02/28/2017       Osteomyelitis of left foot  Diabetic foot infection  Diabetic neuropathy     Plan--  Plan--  Pod 1 L BKA by  Dr Martinez    Afebrile today and leukocytosis improving , b/c mrsa   Continue antibiotics, follow cultures, and discussed with  patient and wife at the bedside   Echo reviewed Continue Levemir as well as pre-meal insulin. Labile BS  Elevated ESR, CRP, leukocytosis  Length of Stay 3 Days   Code- full code  DVT Prophylaxis- Lovenox  Condition--serious  Prognosis-- guarded, poor long-term  Expected Discharge disposition in         3-4 days   Days to rehabilitation     SUBJECTIVE--HPI/ Events overnight / CC- Hospital Follow up visit/ ROS-not detailed ,  as performed below    Lying in bed     Says lot of phantom pain Gen -no fevers, chills  CV-no chest pain, palpitations  Resp-no cough, dyspnea  GI-no N/V/D, abd pain      OBJECTIVE        Vital Sign Min/Max for last 24 hours  Temp  Min: 97.8 °F (36.6 °C)  Max: 102.4 °F (39.1 °C)   BP  Min: 110/73  Max: 156/97   Pulse  Min: 11  Max: 130   Resp  Min: 18  Max: 24   SpO2  Min: 92 %  Max: 97 %   Flow (L/min)  Min: 2  Max: 4      Intake/Output Summary (Last 24 hours) at 03/03/17 1426  Last data filed at 03/03/17 1330   Gross per 24 hour   Intake   1750 ml   Output    950 ml   Net    800 ml           Gen/Constitutional-no acute distress  CV-RRR, S1 S2 normal, 2/6 systolic murmur  Resp-CTAB, no wheezes  Abd-soft, NT, ND, +BS  Ext-no edema, left BKA stump under dressing Neuro-A&Ox3, no focal deficits  Flat  mood, not much interested in conversation   Skin, no new rashes over last 24 hours    Medications    Current Facility-Administered Medications:   •  acetaminophen (TYLENOL) tablet 650 mg, 650 mg, Oral, Q4H PRN, Melisa Boles MD, 650 mg at 03/01/17 2119  •  aspirin EC tablet 81 mg, 81 mg, Oral, Daily, Melisa Boles MD, 81 mg at 03/03/17 0817  •  DAPTOmycin (CUBICIN) 700 mg in sodium chloride 0.9 % 50 mL IVPB, 700 mg, Intravenous, Q24H, Usman Dong MD, Last Rate: 100 mL/hr at 03/03/17 1330, 700 mg at 03/03/17 1330  •  dextrose (D50W) solution 25 g, 25 g, Intravenous, Q15 Min PRN, Melisa Boles MD  •  dextrose (GLUTOSE) oral gel 15 g, 15 g, Oral, Q15 Min PRN, Melisa Boles MD  •  docusate sodium (COLACE) capsule 100 mg, 100 mg, Oral, BID, Melisa Boles MD, 100 mg at 03/03/17 0817  •  famotidine (PEPCID) tablet 20 mg, 20 mg, Oral, BID, Melisa Boles MD, 20 mg at 03/03/17 0817  •  gabapentin (NEURONTIN) capsule 1,200 mg, 1,200 mg, Oral, Q8H, Melisa Boles MD, 1,200 mg at 03/03/17 1324  •  glucagon (GLUCAGEN) injection 1 mg, 1 mg, Subcutaneous, Q15 Min PRN, Melisa Boles MD  •  HYDROcodone-acetaminophen (NORCO)  MG per tablet 1 tablet, 1 tablet, Oral, Q6H PRN, Unruly Lopez MD, 1 tablet at 03/03/17 1011  •  insulin detemir (LEVEMIR) injection 30 Units, 30 Units, Subcutaneous, Nightly, Casie M Mayne, PA, 30 Units at 03/01/17 2217  •  insulin lispro (humaLOG) injection 0-9 Units, 0-9 Units, Subcutaneous, 4x Daily With Meals & Nightly, Melisa Boles MD, 6 Units at 03/03/17 1324  •  losartan (COZAAR) tablet 100 mg, 100 mg, Oral, Q24H, Melisa Boles MD, 100 mg at 03/03/17 0858  •  Morphine sulfate (PF) injection 1 mg, 1 mg, Intravenous, Q4H PRN **AND** naloxone (NARCAN) injection 0.4 mg, 0.4 mg, Intravenous, Q5 Min PRN, Melisa Boles MD  •  ondansetron (ZOFRAN) injection 4 mg, 4 mg, Intravenous, Q6H PRN, Melisa Boles MD, 4 mg at 03/01/17 0998  •  piperacillin-tazobactam (ZOSYN) 4.5 g/100 mL 0.9% NS IVPB (mbp), 4.5 g, Intravenous, Q6H,  Usman Dong MD, 4.5 g at 03/03/17 0858  •  polyethylene glycol (MIRALAX) powder 17 g, 17 g, Oral, Daily, Melisa Boles MD, 17 g at 02/28/17 2008  •  probiotic (CULTURELLE) capsule 1 capsule, 1 capsule, Oral, Daily, Melisa Boles MD, 1 capsule at 03/03/17 0817  •  sodium chloride 0.9 % flush 1-10 mL, 1-10 mL, Intravenous, PRN, Melisa Boles MD  •  sodium chloride 0.9 % flush 10 mL, 10 mL, Intravenous, PRN, Carlos Brothers MD  •  sodium chloride 0.9 % infusion, 100 mL/hr, Intravenous, Continuous, Melisa Boles MD, Last Rate: 100 mL/hr at 03/02/17 2301, 100 mL/hr at 03/02/17 2301  I have reviewed the labs, culture data, radiology results, and diagnostic studies.    Results from last 7 days  Lab Units 03/02/17  0908 03/01/17  0710 02/28/17  1553   SODIUM mmol/L 133 132 129*   POTASSIUM mmol/L 3.7 3.8 4.3   CHLORIDE mmol/L 102 98* 92*   TOTAL CO2 mmol/L 26.0 25.0 26.0   BUN mg/dL 28* 19 19   CREATININE mg/dL 0.80 0.60 0.80   CALCIUM mg/dL 7.7* 9.2 9.8   GLUCOSE mg/dL 162* 152* 255*       Results from last 7 days  Lab Units 03/02/17  0813 03/01/17  0710 02/28/17  1553   WBC 10*3/mm3 12.24* 37.74* 18.47*   HEMOGLOBIN g/dL 10.8* 11.3* 12.8*   HEMATOCRIT % 32.8* 35.0* 38.2*   PLATELETS 10*3/mm3 272 362 499*           Culture Data:    Radiology Results:  Imaging Results (last 24 hours)     Procedure Component Value Units Date/Time    XR Foot 3+ View Left [[id number]] Collected:  03/01/17 1036     Updated:  03/01/17 1057    Narrative:       EXAMINATION: XR FOOT 3+ VW LEFT-      INDICATION: Osteomyelitis.      COMPARISON: Compared to MR datasets 01/25/2017.     FINDINGS:   1. The third, fourth and fifth digits of the left foot have been  resected from the metatarsals distally.  2. There is rarefaction and osteoporosis with cortical mineral loss of  the head of the second metatarsal, however, active focal erosion is not  identified.  3. There is degenerative disease in the midfoot structures without  erosion. Hindfoot  structures are unremarkable and the left lower leg and  ankle are unremarkable.           Impression:       1. Status post amputation third, fourth and fifth digits or raise of the  left foot involving the third, fourth and fifth metatarsals and toes.  2. Active bone erosion, fracture or destructive process is not  identified from a conventional image standpoint currently.     D:  02/28/2017  E:  03/01/2017     This report was finalized on 3/1/2017 10:55 AM by Dr. Shane Villa MD.           *. Please note that portions of this note were completed with a voice recognition program. Efforts were made to edit the dictations, but occasionally words are mistranscribed.  Unruly Lopez MD03/03/172:26 PM

## 2017-03-03 NOTE — PROGRESS NOTES
Adult Nutrition  Assessment/PES    Patient Name:  Kendrick Crystal  YOB: 1970  MRN: [id number]  Admit Date:  2/28/2017    Assessment Date:  3/3/2017        Reason for Assessment       03/03/17 1348    Reason for Assessment    Reason For Assessment/Visit follow up protocol    Time Spent (min) 20    Diagnosis --   per notes this adm    Infectious Disease MRSA   bacteremia    Other diagnosis s/p L BKA (3/2)   Patient Active Problem List   Diagnosis   • DUNLAP Cirrhosis   • Essential hypertension   • Non-compliance   • Type 2 diabetes mellitus with hyperosmolarity without nonketotic hyperglycemic-hyperosmolar coma (nkhhc)   • Arthritis   • Hyperlipemia   • Hypertriglyceridemia, essential   • Diabetic foot ulcer associated with diabetes mellitus due to underlying condition   • Cellulitis of left foot   • Poorly controlled type 2 diabetes mellitus with peripheral neuropathy   • DUNLAP (nonalcoholic steatohepatitis)   • Hyponatremia   • Neutrophilic leukocytosis   • Hypomagnesemia   • Encephalopathy, hepatic   • Hypertension   • Osteomyelitis of left foot   • Sepsis   • Diabetic foot infection   • Diabetic neuropathy              Nutrition/Diet History       03/03/17 1349    Nutrition/Diet History    Reported/Observed By Patient    Appetite Fair    Other pt reports ate almost all of lunch tray today              Labs/Tests/Procedures/Meds       03/03/17 1349    Labs/Tests/Procedures/Meds    Labs/Tests Review Reviewed                Nutrition Prescription Ordered       03/03/17 1349    Nutrition Prescription PO    Current PO Diet Regular    Supplement Boost Glucose Control    Supplement Frequency 2 times a day    Common Modifiers Cardiac;Consistent Carbohydrate            Evaluation of Received Nutrient/Fluid Intake       03/03/17 1354    PO Evaluation    Number of Days PO Intake Evaluated Insufficient Data   no recorded PO intake              Problem/Interventions:        Problem 1       03/03/17 7751     Nutrition Diagnoses Problem 1    Problem 1 Unintended Weight Loss    Etiology (related to) Medical Diagnosis   clinical condition    Signs/Symptoms (evidenced by) Unintended Weight Change    Unintended Weight Change Loss    Number of Pounds Lost 18 lb    Weight loss time period 1 1/2 months                    Intervention Goal       03/03/17 1355    Intervention Goal    General Nutrition support treatment    PO Increase intake            Nutrition Intervention       03/03/17 1355    Nutrition Intervention    RD/Tech Action Advise alternate selection;Interview for preference;Menu adjusted;Care plan reviewd;Follow Tx progress;Encourage intake              Education/Evaluation       03/03/17 1355    Monitor/Evaluation    Monitor Per protocol;PO intake;Supplement intake          Electronically signed by:  Ernestina Herring MS RD/LD [facility]  03/03/17 1:55 PM

## 2017-03-04 LAB
ALBUMIN SERPL-MCNC: 2.8 G/DL (ref 3.2–4.8)
ALBUMIN/GLOB SERPL: 0.9 G/DL (ref 1.5–2.5)
ALP SERPL-CCNC: 175 U/L (ref 25–100)
ALT SERPL W P-5'-P-CCNC: 27 U/L (ref 7–40)
ANION GAP SERPL CALCULATED.3IONS-SCNC: 6 MMOL/L (ref 3–11)
AST SERPL-CCNC: 30 U/L (ref 0–33)
BASOPHILS # BLD AUTO: 0.02 10*3/MM3 (ref 0–0.2)
BASOPHILS NFR BLD AUTO: 0.2 % (ref 0–1)
BILIRUB SERPL-MCNC: 0.3 MG/DL (ref 0.3–1.2)
BUN BLD-MCNC: 21 MG/DL (ref 9–23)
BUN/CREAT SERPL: 42 (ref 7–25)
CALCIUM SPEC-SCNC: 7.8 MG/DL (ref 8.7–10.4)
CHLORIDE SERPL-SCNC: 101 MMOL/L (ref 99–109)
CK SERPL-CCNC: 197 U/L (ref 26–174)
CO2 SERPL-SCNC: 25 MMOL/L (ref 20–31)
CREAT BLD-MCNC: 0.5 MG/DL (ref 0.6–1.3)
DEPRECATED RDW RBC AUTO: 44.6 FL (ref 37–54)
EOSINOPHIL # BLD AUTO: 0.06 10*3/MM3 (ref 0.1–0.3)
EOSINOPHIL NFR BLD AUTO: 0.6 % (ref 0–3)
ERYTHROCYTE [DISTWIDTH] IN BLOOD BY AUTOMATED COUNT: 14.7 % (ref 11.3–14.5)
GFR SERPL CREATININE-BSD FRML MDRD: >150 ML/MIN/1.73
GLOBULIN UR ELPH-MCNC: 3 GM/DL
GLUCOSE BLD-MCNC: 194 MG/DL (ref 70–100)
GLUCOSE BLDC GLUCOMTR-MCNC: 161 MG/DL (ref 70–130)
GLUCOSE BLDC GLUCOMTR-MCNC: 176 MG/DL (ref 70–130)
GLUCOSE BLDC GLUCOMTR-MCNC: 192 MG/DL (ref 70–130)
GLUCOSE BLDC GLUCOMTR-MCNC: 219 MG/DL (ref 70–130)
HCT VFR BLD AUTO: 29.5 % (ref 38.9–50.9)
HGB BLD-MCNC: 9.6 G/DL (ref 13.1–17.5)
IMM GRANULOCYTES # BLD: 0.03 10*3/MM3 (ref 0–0.03)
IMM GRANULOCYTES NFR BLD: 0.3 % (ref 0–0.6)
LYMPHOCYTES # BLD AUTO: 1.54 10*3/MM3 (ref 0.6–4.8)
LYMPHOCYTES NFR BLD AUTO: 15.3 % (ref 24–44)
MAGNESIUM SERPL-MCNC: 1.5 MG/DL (ref 1.3–2.7)
MCH RBC QN AUTO: 26.7 PG (ref 27–31)
MCHC RBC AUTO-ENTMCNC: 32.5 G/DL (ref 32–36)
MCV RBC AUTO: 82.2 FL (ref 80–99)
MONOCYTES # BLD AUTO: 1.02 10*3/MM3 (ref 0–1)
MONOCYTES NFR BLD AUTO: 10.1 % (ref 0–12)
NEUTROPHILS # BLD AUTO: 7.42 10*3/MM3 (ref 1.5–8.3)
NEUTROPHILS NFR BLD AUTO: 73.5 % (ref 41–71)
PLATELET # BLD AUTO: 252 10*3/MM3 (ref 150–450)
PMV BLD AUTO: 11 FL (ref 6–12)
POTASSIUM BLD-SCNC: 3.8 MMOL/L (ref 3.5–5.5)
PROT SERPL-MCNC: 5.8 G/DL (ref 5.7–8.2)
RBC # BLD AUTO: 3.59 10*6/MM3 (ref 4.2–5.76)
SODIUM BLD-SCNC: 132 MMOL/L (ref 132–146)
WBC NRBC COR # BLD: 10.09 10*3/MM3 (ref 3.5–10.8)

## 2017-03-04 PROCEDURE — 25010000002 DAPTOMYCIN PER 1 MG: Performed by: INTERNAL MEDICINE

## 2017-03-04 PROCEDURE — 82550 ASSAY OF CK (CPK): CPT | Performed by: INTERNAL MEDICINE

## 2017-03-04 PROCEDURE — 83735 ASSAY OF MAGNESIUM: CPT | Performed by: FAMILY MEDICINE

## 2017-03-04 PROCEDURE — 82962 GLUCOSE BLOOD TEST: CPT

## 2017-03-04 PROCEDURE — 97162 PT EVAL MOD COMPLEX 30 MIN: CPT

## 2017-03-04 PROCEDURE — 97166 OT EVAL MOD COMPLEX 45 MIN: CPT

## 2017-03-04 PROCEDURE — 25010000002 MORPHINE SULFATE (PF) 2 MG/ML SOLUTION: Performed by: FAMILY MEDICINE

## 2017-03-04 PROCEDURE — 85025 COMPLETE CBC W/AUTO DIFF WBC: CPT | Performed by: FAMILY MEDICINE

## 2017-03-04 PROCEDURE — 25010000002 PIPERACILLIN-TAZOBACTAM: Performed by: INTERNAL MEDICINE

## 2017-03-04 PROCEDURE — 97110 THERAPEUTIC EXERCISES: CPT

## 2017-03-04 PROCEDURE — 80053 COMPREHEN METABOLIC PANEL: CPT | Performed by: INTERNAL MEDICINE

## 2017-03-04 PROCEDURE — 63710000001 INSULIN DETEMIR PER 5 UNITS: Performed by: PHYSICIAN ASSISTANT

## 2017-03-04 RX ORDER — ZOLPIDEM TARTRATE 5 MG/1
5 TABLET ORAL NIGHTLY PRN
Status: DISCONTINUED | OUTPATIENT
Start: 2017-03-04 | End: 2017-03-07 | Stop reason: HOSPADM

## 2017-03-04 RX ORDER — MAGNESIUM SULFATE HEPTAHYDRATE 40 MG/ML
4 INJECTION, SOLUTION INTRAVENOUS ONCE
Status: COMPLETED | OUTPATIENT
Start: 2017-03-04 | End: 2017-03-04

## 2017-03-04 RX ADMIN — INSULIN LISPRO 4 UNITS: 100 INJECTION, SOLUTION INTRAVENOUS; SUBCUTANEOUS at 18:26

## 2017-03-04 RX ADMIN — TAZOBACTAM SODIUM AND PIPERACILLIN SODIUM 4.5 G: .5; 4 INJECTION, POWDER, LYOPHILIZED, FOR SOLUTION INTRAVENOUS at 03:50

## 2017-03-04 RX ADMIN — FAMOTIDINE 20 MG: 20 TABLET ORAL at 18:26

## 2017-03-04 RX ADMIN — INSULIN LISPRO 2 UNITS: 100 INJECTION, SOLUTION INTRAVENOUS; SUBCUTANEOUS at 22:05

## 2017-03-04 RX ADMIN — MORPHINE SULFATE 1 MG: 2 INJECTION, SOLUTION INTRAMUSCULAR; INTRAVENOUS at 08:44

## 2017-03-04 RX ADMIN — Medication 1 CAPSULE: at 08:41

## 2017-03-04 RX ADMIN — TAZOBACTAM SODIUM AND PIPERACILLIN SODIUM 4.5 G: .5; 4 INJECTION, POWDER, LYOPHILIZED, FOR SOLUTION INTRAVENOUS at 18:26

## 2017-03-04 RX ADMIN — MORPHINE SULFATE 1 MG: 2 INJECTION, SOLUTION INTRAMUSCULAR; INTRAVENOUS at 13:31

## 2017-03-04 RX ADMIN — ASPIRIN 81 MG: 81 TABLET, COATED ORAL at 08:41

## 2017-03-04 RX ADMIN — FAMOTIDINE 20 MG: 20 TABLET ORAL at 08:42

## 2017-03-04 RX ADMIN — TAZOBACTAM SODIUM AND PIPERACILLIN SODIUM 4.5 G: .5; 4 INJECTION, POWDER, LYOPHILIZED, FOR SOLUTION INTRAVENOUS at 10:11

## 2017-03-04 RX ADMIN — HYDROCODONE BITARTRATE AND ACETAMINOPHEN 1 TABLET: 10; 325 TABLET ORAL at 21:52

## 2017-03-04 RX ADMIN — DOCUSATE SODIUM 100 MG: 100 CAPSULE, LIQUID FILLED ORAL at 08:42

## 2017-03-04 RX ADMIN — INSULIN LISPRO 2 UNITS: 100 INJECTION, SOLUTION INTRAVENOUS; SUBCUTANEOUS at 08:42

## 2017-03-04 RX ADMIN — GABAPENTIN 1200 MG: 300 CAPSULE ORAL at 22:20

## 2017-03-04 RX ADMIN — LOSARTAN POTASSIUM 100 MG: 50 TABLET, FILM COATED ORAL at 08:42

## 2017-03-04 RX ADMIN — INSULIN LISPRO 2 UNITS: 100 INJECTION, SOLUTION INTRAVENOUS; SUBCUTANEOUS at 13:04

## 2017-03-04 RX ADMIN — INSULIN DETEMIR 30 UNITS: 100 INJECTION, SOLUTION SUBCUTANEOUS at 22:06

## 2017-03-04 RX ADMIN — HYDROCODONE BITARTRATE AND ACETAMINOPHEN 1 TABLET: 10; 325 TABLET ORAL at 04:37

## 2017-03-04 RX ADMIN — DAPTOMYCIN 700 MG: 500 INJECTION, POWDER, LYOPHILIZED, FOR SOLUTION INTRAVENOUS at 13:04

## 2017-03-04 RX ADMIN — GABAPENTIN 1200 MG: 300 CAPSULE ORAL at 15:02

## 2017-03-04 RX ADMIN — TAZOBACTAM SODIUM AND PIPERACILLIN SODIUM 4.5 G: .5; 4 INJECTION, POWDER, LYOPHILIZED, FOR SOLUTION INTRAVENOUS at 22:21

## 2017-03-04 RX ADMIN — MAGNESIUM SULFATE HEPTAHYDRATE 4 G: 40 INJECTION, SOLUTION INTRAVENOUS at 15:02

## 2017-03-04 NOTE — PLAN OF CARE
Problem: Patient Care Overview (Adult)  Goal: Plan of Care Review  Outcome: Ongoing (interventions implemented as appropriate)    03/04/17 1249   Coping/Psychosocial Response Interventions   Plan Of Care Reviewed With patient;spouse   Outcome Evaluation   Outcome Summary/Follow up Plan Pt presents with significant deficits with mobility. He has weakness of BUEs & his RLE & was unable to take steps forward using a RW. He is fearful of falling but has poor safety awareness. Pt would benefit from skilled PT services to address mobility deficits and improve function to maximize his level of independence. Pt would benefit from inpatient rehab after discharge from acute [facility] hospital.         03/04/17 1249   Coping/Psychosocial Response Interventions   Plan Of Care Reviewed With patient;spouse   Outcome Evaluation   Outcome Summary/Follow up Plan Pt presents with significant deficits in mobility. He has weakness of BUEs & his RLE & was unable to take steps forward using a RW. He is fearful of falling but has poor safety awareness. Pt would benefit from skilled PT services to address mobility deficits and improve function to maximize his level of independence. Pt would benefit from inpatient rehab after discharge from acute [facility] hospital.         Problem: Inpatient Physical Therapy  Goal: Bed Mobility Goal LTG- PT  Outcome: Ongoing (interventions implemented as appropriate)    03/04/17 1249   Bed Mobility PT LTG   Bed Mobility PT LTG, Date Established 03/04/17   Bed Mobility PT LTG, Time to Achieve 2 wks   Bed Mobility PT LTG, Activity Type supine to sit/sit to supine   Bed Mobility PT LTG, [place] Level minimum assist (75% patient effort)   Bed Mobility PT LTG, Outcome goal ongoing       Goal: Transfer Training Goal 1 LTG- PT  Outcome: Ongoing (interventions implemented as appropriate)    03/04/17 1249   Transfer Training PT LTG   Transfer Training PT LTG, Date Established 03/04/17   Transfer Training PT LTG, Time to  Achieve 2 wks   Transfer Training PT LTG, Activity Type sit to stand/stand to sit   Transfer Training PT LTG, [place] Level minimum assist (75% patient effort)   Transfer Training PT LTG, Outcome goal ongoing       Goal: Gait Training Goal LTG- PT  Outcome: Ongoing (interventions implemented as appropriate)    03/04/17 1249   Gait Training PT LTG   Gait Training Goal PT LTG, Date Established 03/04/17   Gait Training Goal PT LTG, Time to Achieve 2 wks   Gait Training Goal PT LTG, [place] Level minimum assist (75% patient effort)   Gait Training Goal PT LTG, Assist Device walker, rolling   Gait Training Goal PT LTG, Distance to Achieve 50   Gait Training Goal PT LTG, Outcome goal ongoing

## 2017-03-04 NOTE — PROGRESS NOTES
[facility] Progress Note    2/28/2017      Antibiotics:  IV Anti-Infectives     Ordered     Dose/Rate Route Frequency Start Stop    03/03/17 1055  DAPTOmycin (CUBICIN) 700 mg in sodium chloride 0.9 % 50 mL IVPB     Ordering Provider:  Usman Dong MD    700 mg  100 mL/hr over 30 Minutes Intravenous Every 24 Hours 03/03/17 1200      03/01/17 0843  piperacillin-tazobactam (ZOSYN) 4.5 g/100 mL 0.9% NS IVPB (mbp)     Ordering Provider:  Usman Dong MD    4.5 g Intravenous Every 6 Hours 03/01/17 1000      03/01/17 0848  vancomycin 2000 mg/500 mL 0.9% NS IVPB ([facility])     Ordering Provider:  Juancho Kathleen RPH    2,000 mg  over 120 Minutes Intravenous Once 03/01/17 0930 03/01/17 1115    02/28/17 1753  DAPTOmycin (CUBICIN) 450 mg in sodium chloride 0.9 % 50 mL IVPB     Benito Allison [facility] reviewed the order on 02/28/17 1936.   Ordering Provider:  Usman Dong MD    450 mg  100 mL/hr over 30 Minutes Intravenous Every 24 Hours 02/28/17 1800 02/28/17 2040    02/28/17 1753  cefTRIAXone (ROCEPHIN) IVPB 1 g     Ordering Provider:  Carlos Brothers MD    1 g  over 30 Minutes Intravenous Once 02/28/17 1755 02/28/17 1842          CC:sepsis, bacteremia, foot osteo, noncompliance    HPI:  Patient is a 46 y.o. Yr old male with history of uncontrolled diabetes with extensive comorbidity as previously outlined. He has lower extremity vascular disease but no specific option for revascularization per discussion with Dr. Martinez. He was admitted January 2017 with left foot infection, MRI not confirming osteomyelitis, but had surgery on January 30 with metatarsals 3/4/5 amputated, marginal perfusion per operative note and MRSA/GB strep/coag-negative staph in his various cultures. He was transferred to [facility] on broad-spectrum IV antibiotics. While at [facility], he was under the care of Dr. Herrera and, per family/patient, he was told that he required hyperbaric oxygen therapy and had very little chance for long-term  "healing at the foot. He was readmitted on February 13 to [facility] with patient initially interested in having higher level amputation. After discussion with Dr. Martinez, he changed his mind and wanted attempt at salvage again with IV antibiotics/wound care. He was discharged February 17 as outlined in our prior inpatient notes. He was noncompliant with follow-up in the office the week of February 20 despite our calls to the patient. He then stopped antibiotics on his own Wednesday, February 22 and did not notify us. His family was able to get him to come into the office on February 27 at which point he refused IV antibiotics/insisted PICC line be removed and he refused hospitalization/VAC,  understanding the risks of his behavior/decisions as outlined in my office note. He was agreeable for prescription of oral agents and went home to consider his options. On February 28, he called and said he was willing to come to the hospital. He presented to the emergency room, found to have fever/leukocytosis consistent with sepsis and subsequently found to be bacteremic with MRSA.      3/4/17 seen early and sleepy ; per nursing, fever less, no new focal pain and generally stable respiratory status with stable hemodynamics.    ROS:      3/4/17 per nursing, No n/v/d. No rash. No new ADR to Abx.  Nursing reports no focal weakness or description of myalgia.  Otherwise complete review of systems unable to obtain          PE:   Visit Vitals   • /99 (BP Location: Left arm, Patient Position: Lying)   • Pulse 100   • Temp 97.9 °F (36.6 °C) (Oral)   • Resp 18   • Ht 75\" (190.5 cm)   • Wt 250 lb (113 kg)   • SpO2 97%   • BMI 31.25 kg/m2       GENERAL: Sleepy, in no acute distress.   HEENT: Normocephalic, atraumatic.  PERRL. EOMI. No conjunctival injection. No icterus. Oropharynx clear without evidence of thrush or exudate. No evidence of peridontal disease.    NECK: Supple without nuchal rigidity. No mass.  LYMPH: No cervical, " axillary or inguinal lymphadenopathy.  HEART: RRR; No murmur, rubs, gallops.   LUNGS: Clear to auscultation bilaterally without wheezing, rales, rhonchi. Normal respiratory effort. Nonlabored. No dullness.  ABDOMEN: Soft, nontender, nondistended. Positive bowel sounds. No rebound or guarding. NO mass or HSM.  EXT:  No cyanosis, clubbing or edema. No cord.  : Genitalia generally unremarkable.  Without Walker catheter.  MSK: FROM without joint effusions noted arms/legs.    SKIN: Warm and dry without cutaneous eruptions on Inspection/palpation aside from below.    NEURO: Oriented to PPT. No focal deficits on motor/sensory exam at arms/legs.    Left BKA noted     No peripheral stigmata/phenomena of endocarditis    Laboratory Data      Results from last 7 days  Lab Units 03/04/17  0558 03/02/17  0813 03/01/17  0710   WBC 10*3/mm3 10.09 12.24* 37.74*   HEMOGLOBIN g/dL 9.6* 10.8* 11.3*   HEMATOCRIT % 29.5* 32.8* 35.0*   PLATELETS 10*3/mm3 252 272 362       Results from last 7 days  Lab Units 03/04/17  0726   SODIUM mmol/L 132   POTASSIUM mmol/L 3.8   CHLORIDE mmol/L 101   TOTAL CO2 mmol/L 25.0   BUN mg/dL 21   CREATININE mg/dL 0.50*   GLUCOSE mg/dL 194*   CALCIUM mg/dL 7.8*       Results from last 7 days  Lab Units 03/04/17  0726   ALK PHOS U/L 175*   BILIRUBIN mg/dL 0.3   ALT (SGPT) U/L 27   AST (SGOT) U/L 30       Results from last 7 days  Lab Units 02/28/17  1553   SED RATE mm/hr 127*       Results from last 7 days  Lab Units 02/28/17  1553   CRP mg/dL 135.10*       Estimated Creatinine Clearance: 245.1 mL/min (by C-G formula based on Cr of 0.5).      Microbiology:      Radiology:  Imaging Results (last 72 hours)     Procedure Component Value Units Date/Time    XR Foot 3+ View Left [[id number]] Collected:  03/01/17 1036     Updated:  03/01/17 1057    Narrative:       EXAMINATION: XR FOOT 3+ VW LEFT-      INDICATION: Osteomyelitis.      COMPARISON: Compared to MR datasets 01/25/2017.     FINDINGS:   1. The third, fourth  and fifth digits of the left foot have been  resected from the metatarsals distally.  2. There is rarefaction and osteoporosis with cortical mineral loss of  the head of the second metatarsal, however, active focal erosion is not  identified.  3. There is degenerative disease in the midfoot structures without  erosion. Hindfoot structures are unremarkable and the left lower leg and  ankle are unremarkable.           Impression:       1. Status post amputation third, fourth and fifth digits or raise of the  left foot involving the third, fourth and fifth metatarsals and toes.  2. Active bone erosion, fracture or destructive process is not  identified from a conventional image standpoint currently.     D:  02/28/2017  E:  03/01/2017     This report was finalized on 3/1/2017 10:55 AM by Dr. Shane Villa MD.               Impression:   --MRSA bacteremia/sepsis. Likely associated with his left foot osteomyelitis/infection, with general progression of illness likely as a direct result of his medical noncompliance and recent refusal of care. He and his family understand the implication of his recent behavior. Dapto EYAD = 1 and vanco eyad=2 per micro;  Change to dapto.  Repeat cultures pending     --Left foot gangrene/osteomyelitis with progression as above. BKA done     --Medical noncompliance as above     --Diabetes type 2. You need to tightly control blood sugar to give best chance for healing     --Peripheral vascular disease as previously outlined     --Heart murmur by exam associated with the MRSA bacteremia. SHMUEL neg for vegetation per Dr Martinez.  TTE no vegetation described by cardio    --Abnormal CPK.  Possibly related to surgery.  If continued trend up, would need to stop daptomycin.  Monitor.  Asymptomatic per nursing with no description of myalgia/focal ext weakness    PLAN:    --IV daptomycin/Zosyn     --I discussed potential risks and benefits of the prescribed antibiotics that include, but are not limited to,  solid organ toxicity, neuro/bala/vestibular toxicity, renal toxicity, CDiff, cytopenias, hypersensitivity, pulm/muscle tox etc.. Patient/Family voice understanding and agree to proceed.     --Monitor IV and IV antibiotics with risk for systemic complication and potential drug interaction     --Check/review labs cultures and scans     --Highly complex set of issues with high risk for further serious morbidity and other serious sequela     --Discussed with patient's family, Dr. Martinez, and Dr. Lopez, and microbiology.     --d/w Dr Martinez; Dr. Martinez reports to me directly SHMUEL neg for vegetation    --surgery 3/2    Usman Dong MD  3/4/2017

## 2017-03-04 NOTE — PROGRESS NOTES
Acute Care - Occupational Therapy Initial Evaluation  [facility]     Patient Name: Kendrick Crystal  : 1970  MRN: [id number]  Today's Date: 3/4/2017  Onset of Illness/Injury or Date of Surgery Date: 17  Date of Referral to OT: 17  Referring Physician: LAUREN Stubbs    Admit Date: 2017       ICD-10-CM ICD-9-CM   1. Diabetic foot infection E11.69 250.80    L08.9 686.9   2. SIRS (systemic inflammatory response syndrome) R65.10 995.90   3. Osteomyelitis of left foot, unspecified chronicity M86.9 730.27   4. Impaired mobility and ADLs Z74.09 799.89     Patient Active Problem List   Diagnosis   • DUNLAP Cirrhosis   • Essential hypertension   • Non-compliance   • Type 2 diabetes mellitus with hyperosmolarity without nonketotic hyperglycemic-hyperosmolar coma (nkhhc)   • Arthritis   • Hyperlipemia   • Hypertriglyceridemia, essential   • Diabetic foot ulcer associated with diabetes mellitus due to underlying condition   • Cellulitis of left foot   • Poorly controlled type 2 diabetes mellitus with peripheral neuropathy   • DUNLAP (nonalcoholic steatohepatitis)   • Hyponatremia   • Neutrophilic leukocytosis   • Hypomagnesemia   • Encephalopathy, hepatic   • Hypertension   • Osteomyelitis of left foot   • Sepsis   • Diabetic foot infection   • Diabetic neuropathy     Past Medical History   Diagnosis Date   • Arthritis    • Cirrhosis    • Counseling for insulin pump    • Diabetes mellitus    • Encephalopathy, hepatic    • H/O degenerative disc disease    • History of Lissa's gangrene    • Hyperlipemia    • Hypertension    • multiple Skin abscesses    • DUNLAP, bx showed stage IV fibrosis    • Neuropathy      Past Surgical History   Procedure Laterality Date   • Testicle surgery       DEBRIDEMENT FROM GANGRENE   • Leg surgery     • Amputation digit Left 2017     Procedure: left fourth and fifth transmetatarsal toe amputation ;  Surgeon: Juancho Martinez MD;  Location: [facility];  Service:    • Below knee  amputation Left 3/2/2017     Procedure: AMPUTATION BELOW KNEE, SHMUEL;  Surgeon: Juancho Martinez MD;  Location: [facility] OR;  Service:           OT ASSESSMENT FLOWSHEET (last 72 hours)      OT Evaluation       03/04/17 1011 03/02/17 1707 03/01/17 1348 03/01/17 1347       Rehab Evaluation    Document Type evaluation  -AN        Subjective Information agree to therapy;complains of;pain  -AN        General Information    Patient Profile Review yes  -AN        Onset of Illness/Injury or Date of Surgery Date 02/28/17  -AN        Referring Physician LAUREN Stubbs  -AN        General Observations Pt supine, L limb elevated, spouse present  -AN        Pertinent History Of Current Problem Pt had L toe 3-5 amputated on 1/30/16. pt non compliant with wound care and increased infection and wound. L BKA on 3/2/17  -AN        Precautions/Limitations fall precautions   new L BKA  -AN        Prior Level of Function independent:;gait;transfer;ADL's;driving   was using knee scooter prior  -AN        Equipment Currently Used at Home commode;cane, quad;raised toilet;walker, rolling   ramp  -AN walker, rolling;commode;cane, straight  -CR commode;cane, straight;walker, rolling;wheelchair;raised toilet;shower chair  -SG      Plans/Goals Discussed With patient and family;agreed upon  -AN        Risks Reviewed patient and family:;LOB;dizziness;increased discomfort;change in vital signs  -AN        Benefits Reviewed patient and family:;improve function;increase independence;increase strength;increase balance;increase knowledge  -AN        Barriers to Rehab medically complex;previous functional deficit  -AN        Living Environment    Lives With spouse  -AN spouse  -CR spouse  -SG      Living Arrangements apartment  -AN apartment  -CR       Home Accessibility stairs within home;tub/shower is not walk in   pt can use 1st floor  -AN no concerns  -CR       Stair Railings at Home  none  -CR       Type of Financial/Environmental Concern  none  -CR        Transportation Available  car;family or friend will provide  -CR car;family or friend will provide  -SG      Clinical Impression    Date of Referral to OT 02/28/17  -AN        OT Diagnosis Decreased ADL I  -AN        Impairments Found (describe specific impairments) gait, locomotion, and balance  -AN        Patient/Family Goals Statement Agreeable to OT services and rehab  -AN        Criteria for Skilled Therapeutic Interventions Met yes;treatment indicated  -AN        Rehab Potential good, to achieve stated therapy goals  -AN        Therapy Frequency daily  -AN        Anticipated Equipment Needs At Discharge bathing equipment;dressing equipment;reacher  -AN        Anticipated Discharge Disposition inpatient rehabilitation facility  -AN        Functional Level Prior    Ambulation  1-->assistive equipment  -CR  1-->assistive equipment  -SG     Transferring  1-->assistive equipment  -CR  1-->assistive equipment  -SG     Toileting  1-->assistive equipment  -CR  1-->assistive equipment  -SG     Bathing  2-->assistive person  -CR  2-->assistive person  -SG     Dressing  0-->independent  -CR  0-->independent  -SG     Eating  0-->independent  -CR  0-->independent  -SG     Communication  0-->understands/communicates without difficulty  -CR  0-->understands/communicates without difficulty  -SG     Swallowing  0-->swallows foods/liquids without difficulty  -CR       Vital Signs    Pre Systolic BP Rehab 154  -AN        Pre Treatment Diastolic   -AN        Post Systolic BP Rehab 165  -AN        Post Treatment Diastolic BP 98  -AN        Pretreatment Heart Rate (beats/min) 110  -AN        Posttreatment Heart Rate (beats/min) 103  -AN        Pain Assessment    Pain Assessment 0-10  -AN        Pain Score 9  -AN        Post Pain Score 9  -AN        Pain Type Phantom pain;Surgical pain  -AN        Pain Location Other (Comment)   L residual limb  -AN        Pain Intervention(s) Medication (See  MAR);Repositioned;Ambulation/increased activity  -AN        Response to Interventions tolerated  -AN        Cognitive Assessment/Intervention    Current Cognitive/Communication Assessment functional  -AN        Orientation Status oriented x 4  -AN        Follows Commands/Answers Questions 100% of the time  -AN        Personal Safety mild impairment  -AN        Personal Safety Interventions fall prevention program maintained  -AN        ROM (Range of Motion)    General ROM no range of motion deficits identified  -AN        MMT (Manual Muscle Testing)    General MMT Assessment upper extremity strength deficits identified  -AN        General MMT Assessment Detail 3+/5  -AN        Bed Mobility, Assessment/Treatment    Bed Mob, Supine to Sit, [place] moderate assist (50% patient effort);2 person assist required;verbal cues required  -AN        Bed Mobility, Impairments strength decreased;impaired balance  -AN        Transfer Assessment/Treatment    Transfers, Bed-Chair [place] verbal cues required;moderate assist (50% patient effort);2 person assist required  -AN        Transfers, Sit-Stand [place] verbal cues required;moderate assist (50% patient effort);2 person assist required  -AN        Transfers, Stand-Sit [place] verbal cues required;moderate assist (50% patient effort);2 person assist required  -AN        Transfers, Sit-Stand-Sit, Assist Device rolling walker  -AN        Transfer, Safety Issues balance decreased during turns  -AN        Transfer, Impairments impaired balance  -AN        Transfer, Comment Pt had difficulty pivoting foot, had shoe on R foot; much encouragement to complete task  -AN        Lower Body Bathing Assessment/Training    LB Bathing Assess/Train, Comment mod simulated  -AN        Upper Body Dressing Assessment/Training    UB Dressing Assess/Train, Clothing Type donning:;hospital gown  -AN        UB Dressing Assess/Train, Position sitting  -AN        UB Dressing  Assess/Train, [place] minimum assist (75% patient effort)  -AN        Lower Body Dressing Assessment/Training    LB Dressing Assess/Train, Clothing Type donning:;shoes  -AN        LB Dressing Assess/Train, Position edge of bed  -AN        LB Dressing Assess/Train, [place] maximum assist (25% patient effort)  -AN        Motor Skills/Interventions    Additional Documentation Balance Skills Training (Group)  -AN        Balance Skills Training    Sitting-Level of Assistance Close supervision  -AN        Sitting-Balance Support Right upper extremity supported;Left upper extremity supported  -AN        Sitting-Balance Activities Lateral lean;Forward lean  -AN        Standing-Level of Assistance Moderate assistance;x2  -AN        Static Standing Balance Support assistive device  -AN        Standing-Balance Activities Weight Shift R-L  -AN        Standing Balance # of Minutes 2  -AN        Positioning and Restraints    Pre-Treatment Position in bed  -AN        Post Treatment Position chair  -AN        In Chair reclined;call light within reach;encouraged to call for assist;with family/caregiver  -AN          User Key  (r) = Recorded By, (t) = Taken By, (c) = Cosigned By    Initials Name Effective Dates    AN Fela Anguiano OT 06/22/15 -     GRACY Cuevas 05/02/16 -     CHHAYA Moser RN 06/16/16 -            Occupational Therapy Education     Title: PT OT SLP Therapies (Active)     Topic: Occupational Therapy (Done)     Point: ADL training (Done)    Description: Instruct learner(s) on proper safety adaptation and remediation techniques during self care or transfers.   Instruct in proper use of assistive devices.    Learning Progress Summary    Learner Readiness Method Response Comment Documented by Status   Patient Acceptance E,D RODOLFO,MADDIE,NR Educated pt and family on assist needs for ADL's and txfrs. Educated on benefits of daily UE ex and ADL participation. AN 03/04/17 115 Done   Family Acceptance  CECILY COOK,MADDIE,NR Educated pt and family on assist needs for ADL's and txfrs. Educated on benefits of daily UE ex and ADL participation. AN 03/04/17 1157 Done               Point: Home exercise program (Done)    Description: Instruct learner(s) on appropriate technique for monitoring, assisting and/or progressing therapeutic exercises/activities.    Learning Progress Summary    Learner Readiness Method Response Comment Documented by Status   Patient Acceptance JOYCE,CECILY REYNOLDS,DU,NR Educated pt and family on assist needs for ADL's and txfrs. Educated on benefits of daily UE ex and ADL participation. AN 03/04/17 1157 Done   Family Acceptance E,D RODOLFO,DU,NR Educated pt and family on assist needs for ADL's and txfrs. Educated on benefits of daily UE ex and ADL participation. AN 03/04/17 1157 Done               Point: Precautions (Done)    Description: Instruct learner(s) on prescribed precautions during self-care and functional transfers.    Learning Progress Summary    Learner Readiness Method Response Comment Documented by Status   Patient Acceptance CECILY COOK,DU,NR Educated pt and family on assist needs for ADL's and txfrs. Educated on benefits of daily UE ex and ADL participation. AN 03/04/17 1157 Done   Family Acceptance E,CECILY REYNOLDS,DU,NR Educated pt and family on assist needs for ADL's and txfrs. Educated on benefits of daily UE ex and ADL participation. AN 03/04/17 1157 Done                      User Key     Initials Effective Dates Name Provider Type Discipline    AN 06/22/15 -  Fela Anguiano OT Occupational Therapist OT                  OT Recommendation and Plan  Anticipated Equipment Needs At Discharge: bathing equipment, dressing equipment, reacher  Anticipated Discharge Disposition: inpatient rehabilitation facility  Therapy Frequency: daily  Plan of Care Review  Plan Of Care Reviewed With: patient, spouse  Outcome Summary/Follow up Plan: Continue OT to address decreased I with ADL's and functional mobilty this date. Recommend pt  to inpt rehab following discharge. Pt would benefit from AE and DME to increase I and safetly.          OT Goals       03/04/17 1159          Bed Mobility OT LTG    Bed Mobility OT LTG, Date Established 03/04/17  -AN      Bed Mobility OT LTG, Time to Achieve 1 wk  -AN      Bed Mobility OT LTG, Activity Type supine to sit/sit to supine  -AN      Bed Mobility OT LTG, [place] minimum assist (75% patient effort)  -AN      Transfer Training OT LTG    Transfer Training OT LTG, Date Established 03/04/17  -AN      Transfer Training OT LTG, Time to Achieve 1 wk  -AN      Transfer Training OT LTG, Activity Type bed to chair /chair to bed  -AN      Transfer Training OT LTG, [place] minimum assist (75% patient effort);2 person assist required  -AN      Transfer Training OT LTG, Assist Device walker, rolling  -AN      Bathing OT LTG    Bathing Goal OT LTG, Date Established 03/04/17  -AN      Bathing Goal OT LTG, Time to Achieve 1 wk  -AN      Bathing Goal OT LTG, Activity Type simulates;lower body bathing  -AN      Bathing Goal OT LTG, [place] minimum assist (75% patient effort)  -AN      Bathing Goal OT LTG, Assist Device sponge, long handled  -AN      LB Dressing OT LTG    LB Dressing Goal OT LTG, Date Established 03/04/17  -AN      LB Dressing Goal OT LTG, Time to Achieve 1 wk  -AN      LB Dressing Goal OT LTG, [place] minimum assist (75% patient effort)  -AN      LB Dressing Goal OT LTG, Adaptive Equipment reacher;shoe horn, long handled;sock-aid  -AN        User Key  (r) = Recorded By, (t) = Taken By, (c) = Cosigned By    Initials Name Provider Type    AN Fela Anguiano OT Occupational Therapist                Outcome Measures       03/04/17 1011          How much help from another is currently needed...    Putting on and taking off regular lower body clothing? 2  -AN      Bathing (including washing, rinsing, and drying) 2  -AN      Toileting (which includes using toilet bed pan  or urinal) 2  -AN      Putting on and taking off regular upper body clothing 2  -AN      Taking care of personal grooming (such as brushing teeth) 3  -AN      Eating meals 4  -AN      Score 15  -AN      Functional Assessment    Outcome Measure Options AM-PAC 6 Clicks Daily Activity (OT)  -AN        User Key  (r) = Recorded By, (t) = Taken By, (c) = Cosigned By    Initials Name Provider Type    MARIA DOLORES Anguiano OT Occupational Therapist          Time Calculation:   OT Start Time: 1011    Therapy Charges for Today     Code Description Service Date Service Provider Modifiers Qty    [id number]  OT EVAL MOD COMPLEXITY 4 3/4/2017 Fela Anguiano OT GO 1               Fela Anguiano OT  3/4/2017

## 2017-03-04 NOTE — PLAN OF CARE
Problem: Patient Care Overview (Adult)  Goal: Plan of Care Review  Outcome: Ongoing (interventions implemented as appropriate)    03/04/17 1159   Coping/Psychosocial Response Interventions   Plan Of Care Reviewed With patient;spouse   Outcome Evaluation   Outcome Summary/Follow up Plan Continue OT to address decreased I with ADL's and functional mobilty this date. Recommend pt to inpt rehab following discharge. Pt would benefit from AE and DME to increase I and safetly.         Problem: Inpatient Occupational Therapy  Goal: Bed Mobility Goal LTG- OT  Outcome: Ongoing (interventions implemented as appropriate)    03/04/17 1159   Bed Mobility OT LTG   Bed Mobility OT LTG, Date Established 03/04/17   Bed Mobility OT LTG, Time to Achieve 1 wk   Bed Mobility OT LTG, Activity Type supine to sit/sit to supine   Bed Mobility OT LTG, [place] Level minimum assist (75% patient effort)       Goal: Transfer Training Goal 1 LTG- OT  Outcome: Ongoing (interventions implemented as appropriate)    03/04/17 1159   Transfer Training OT LTG   Transfer Training OT LTG, Date Established 03/04/17   Transfer Training OT LTG, Time to Achieve 1 wk   Transfer Training OT LTG, Activity Type bed to chair /chair to bed   Transfer Training OT LTG, [place] Level minimum assist (75% patient effort);2 person assist required   Transfer Training OT LTG, Assist Device walker, rolling       Goal: Bathing Goal LTG- OT  Outcome: Ongoing (interventions implemented as appropriate)    03/04/17 1159   Bathing OT LTG   Bathing Goal OT LTG, Date Established 03/04/17   Bathing Goal OT LTG, Time to Achieve 1 wk   Bathing Goal OT LTG, Activity Type simulates;lower body bathing   Bathing Goal OT LTG, [place] Level minimum assist (75% patient effort)   Bathing Goal OT LTG, Assist Device sponge, long handled       Goal: LB Dressing Goal LTG- OT  Outcome: Ongoing (interventions implemented as appropriate)    03/04/17 1159   LB Dressing OT LTG   LB  Dressing Goal OT LTG, Date Established 03/04/17   LB Dressing Goal OT LTG, Time to Achieve 1 wk   LB Dressing Goal OT LTG, [place] Level minimum assist (75% patient effort)   LB Dressing Goal OT LTG, Adaptive Equipment reacher;shoe horn, long handled;sock-aid

## 2017-03-04 NOTE — PROGRESS NOTES
Cardiothoracic Surgery Progress Note      POD # 2 s/p L BKA       LOS: 4 days      Subjective:  Pain controlled.    Objective:  Vital Signs  Temp:  [97.9 °F (36.6 °C)-98.9 °F (37.2 °C)] 97.9 °F (36.6 °C)  Heart Rate:  [] 100  Resp:  [18] 18  BP: (111-159)/(89-99) 159/99    Physical Exam:   General Appearance: alert, appears stated age and cooperative   Lungs: clear to auscultation, respirations regular, respirations even and respirations unlabored   Heart: regular rhythm & normal rate, normal S1, S2 and no murmur, no clau, no rub   Skin: Bandage c/d/i     Results:    Results from last 7 days  Lab Units 03/04/17  0558   WBC 10*3/mm3 10.09   HEMOGLOBIN g/dL 9.6*   HEMATOCRIT % 29.5*   PLATELETS 10*3/mm3 252       Results from last 7 days  Lab Units 03/04/17  0726   SODIUM mmol/L 132   POTASSIUM mmol/L 3.8   CHLORIDE mmol/L 101   TOTAL CO2 mmol/L 25.0   BUN mg/dL 21   CREATININE mg/dL 0.50*   GLUCOSE mg/dL 194*   CALCIUM mg/dL 7.8*         Assessment:  POD # 2 s/p L BKA, expected recovery    Plan:  ABX as per ID  Patient ok with starting antidepressant, will defer to hospitalists  PT/OT daily  Dispo planning for rehab Monday    Juancho Martinez MD  03/04/17  9:43 AM

## 2017-03-04 NOTE — PROGRESS NOTES
Acute Care - Physical Therapy Initial Evaluation  [facility]     Patient Name: Kendrick Crystal  : 1970  MRN: [id number]  Today's Date: 3/4/2017   Onset of Illness/Injury or Date of Surgery Date: 17 (L BKA)  Date of Referral to PT: 17  Referring Physician: CANDY Stubbs      Admit Date: 2017     Visit Dx:    ICD-10-CM ICD-9-CM   1. Diabetic foot infection E11.69 250.80    L08.9 686.9   2. SIRS (systemic inflammatory response syndrome) R65.10 995.90   3. Osteomyelitis of left foot, unspecified chronicity M86.9 730.27   4. Impaired mobility and ADLs Z74.09 799.89   5. Impaired functional mobility, balance, gait, and endurance Z74.09 V49.89     Patient Active Problem List   Diagnosis   • DUNLAP Cirrhosis   • Essential hypertension   • Non-compliance   • Type 2 diabetes mellitus with hyperosmolarity without nonketotic hyperglycemic-hyperosmolar coma (nkhhc)   • Arthritis   • Hyperlipemia   • Hypertriglyceridemia, essential   • Diabetic foot ulcer associated with diabetes mellitus due to underlying condition   • Cellulitis of left foot   • Poorly controlled type 2 diabetes mellitus with peripheral neuropathy   • DUNLAP (nonalcoholic steatohepatitis)   • Hyponatremia   • Neutrophilic leukocytosis   • Hypomagnesemia   • Encephalopathy, hepatic   • Hypertension   • Osteomyelitis of left foot   • Sepsis   • Diabetic foot infection   • Diabetic neuropathy     Past Medical History   Diagnosis Date   • Arthritis    • Cirrhosis    • Counseling for insulin pump    • Diabetes mellitus    • Encephalopathy, hepatic    • H/O degenerative disc disease    • History of Lissa's gangrene    • Hyperlipemia    • Hypertension    • multiple Skin abscesses    • DUNLAP, bx showed stage IV fibrosis    • Neuropathy      Past Surgical History   Procedure Laterality Date   • Testicle surgery       DEBRIDEMENT FROM GANGRENE   • Leg surgery     • Amputation digit Left 2017     Procedure: left fourth and fifth transmetatarsal  toe amputation ;  Surgeon: Juancho Martinez MD;  Location:  [facility];  Service:    • Below knee amputation Left 3/2/2017     Procedure: AMPUTATION BELOW KNEE, SHMUEL;  Surgeon: Juancho Martinez MD;  Location:  [facility];  Service:           PT ASSESSMENT (last 72 hours)      PT Evaluation       03/04/17 1015 03/04/17 1011    Rehab Evaluation    Document Type evaluation  -LS evaluation  -AN    Subjective Information agree to therapy;complains of;pain   phantom pain. pt expressed fear of falling  -LS agree to therapy;complains of;pain  -AN    General Information    Patient Profile Review yes  -LS yes  -AN    Onset of Illness/Injury or Date of Surgery Date 03/03/17   L BKA  -LS 02/28/17  -AN    Referring Physician CANDY Stubbs  -LS LAUREN Stubbs  -AN    General Observations  Pt supine, L limb elevated, spouse present  -AN    Pertinent History Of Current Problem Hx of uncontrolled DM & Lfoot ulcers s/p amputation of left 3rd-5th metatarsals 1/2017. Home w/wound vac w/which pt was noncompliant. To ED feeling sick, c/o fever, malaise, nausea, worsening L foot pain, foul smelling drainage. Adm w/sepsis. S/P L BKA 3/2/17  -LS Pt had L toe 3-5 amputated on 1/30/16. pt non compliant with wound care and increased infection and wound. L BKA on 3/2/17  -AN    Precautions/Limitations fall precautions   new L BKA. poor safety awareness.  -LS fall precautions   new L BKA  -AN    Prior Level of Function independent:;gait;transfer;bed mobility;ADL's;driving   was using knee scooter prior  - independent:;gait;transfer;ADL's;driving   was using knee scooter prior  -AN    Equipment Currently Used at Home raised toilet;walker, rolling;shower chair   3 prong cane, ramp to enter home  - commode;cane, quad;raised toilet;walker, rolling   ramp  -AN    Plans/Goals Discussed With patient;spouse/S.O.;agreed upon  - patient and family;agreed upon  -AN    Risks Reviewed patient:;spouse/S.O.:;increased discomfort  - patient and  family:;LOB;dizziness;increased discomfort;change in vital signs  -AN    Benefits Reviewed patient:;spouse/S.O.:;improve function;increase independence;increase strength;increase balance  -LS patient and family:;improve function;increase independence;increase strength;increase balance;increase knowledge  -AN    Barriers to Rehab medically complex;previous functional deficit  -LS medically complex;previous functional deficit  -AN    Living Environment    Lives With spouse  -LS spouse  -AN    Living Arrangements  apartment  -AN    Home Accessibility bed and bath on same level   bedroom & bathroom on first floor of 2 story home  -LS stairs within home;tub/shower is not walk in   pt can use 1st floor  -AN    Stair Railings at Home --   HR on each side of stairs inside per pt  -LS     Clinical Impression    Date of Referral to PT 03/03/17  -LS     PT Diagnosis Impaired functional mobility  -LS     Patient/Family Goals Statement Pt did not state a goal  -LS     Criteria for Skilled Therapeutic Interventions Met yes;treatment indicated  -LS     Rehab Potential good, to achieve stated therapy goals  -LS     Vital Signs    Pre Systolic BP Rehab 154  -  -AN    Pre Treatment Diastolic   -  -AN    Post Systolic BP Rehab 165  -  -AN    Post Treatment Diastolic BP 98  -LS 98  -AN    Pretreatment Heart Rate (beats/min) 110  -  -AN    Posttreatment Heart Rate (beats/min) 103  -  -AN    Pain Assessment    Pain Assessment 0-10  -LS 0-10  -AN    Pain Score 9  -LS 9  -AN    Post Pain Score 9  -LS 9  -AN    Pain Type Surgical pain;Phantom pain  -LS Phantom pain;Surgical pain  -AN    Pain Location --   L residual lower extremity  -LS Other (Comment)   L residual limb  -AN    Pain Intervention(s) Repositioned   pt had pain meds pror to PT& OT evals  -LS Medication (See MAR);Repositioned;Ambulation/increased activity  -AN    Response to Interventions  tolerated  -AN    Cognitive Assessment/Intervention     Current Cognitive/Communication Assessment functional  -LS functional  -AN    Orientation Status oriented x 4  -LS oriented x 4  -AN    Follows Commands/Answers Questions 100% of the time  -% of the time  -AN    Personal Safety  mild impairment  -AN    Personal Safety Interventions fall prevention program maintained;gait belt  -LS fall prevention program maintained  -AN    ROM (Range of Motion)    General ROM --   R hip, knee, & ankle AROM WFL  -LS no range of motion deficits identified  -AN    General ROM Detail --   L hip AAROM WFL. Full L knee ext. L knee active flexion 45   -LS     MMT (Manual Muscle Testing)    General MMT Assessment --   R hip flexors, ankle dorsiflexors, & knee extensors 3+  -LS upper extremity strength deficits identified  -AN    General MMT Assessment Detail --   deferred RLE MMT  -LS 3+/5  -AN    Bed Mobility, Assessment/Treatment    Bed Mob, Supine to Sit, [place] moderate assist (50% patient effort);2 person assist required  -LS moderate assist (50% patient effort);2 person assist required;verbal cues required  -AN    Bed Mobility, Impairments strength decreased;impaired balance  -LS strength decreased;impaired balance  -AN    Transfer Assessment/Treatment    Transfers, Bed-Chair Laura verbal cues required;moderate assist (50% patient effort);1 person + 1 person to manage equipment   stand pivot w/RW; needs A to maneuver walker  -LS verbal cues required;moderate assist (50% patient effort);2 person assist required  -AN    Transfers, Sit-Stand [place] moderate assist (50% patient effort);2 person assist required;verbal cues required  -LS verbal cues required;moderate assist (50% patient effort);2 person assist required  -AN    Transfers, Stand-Sit [place] moderate assist (50% patient effort);2 person assist required;verbal cues required  -LS verbal cues required;moderate assist (50% patient effort);2 person assist required  -AN    Transfers, Sit-Stand-Sit,  Assist Device rolling walker  -LS rolling walker  -AN    Transfer, Safety Issues balance decreased during turns;sequencing ability decreased;weight-shifting ability decreased  -LS balance decreased during turns  -AN    Transfer, Impairments strength decreased;impaired balance  -LS impaired balance  -AN    Transfer, Comment stood 3 times. attempted to take steps fwd but unable to lift R foot off floor or scoot it fwd. On first 2 attempts, pt began pushing back and saying he had to sit & couldn't do it. Able to pivot to chair on 3rd attempt with assistance, vcs, and a lot of encouragement. Pt has poor safety awareness, and on one occasion while standing, let go of RW w/1 hand and tried to hold IV pole.  -LS Pt had difficulty pivoting foot, had shoe on R foot; much encouragement to complete task  -AN    Gait Assessment/Treatment    Gait, [place] Level unable to perform  -LS     Motor Skills/Interventions    Additional Documentation  Balance Skills Training (Group)  -AN    Balance Skills Training    Sitting-Level of Assistance  Close supervision  -AN    Sitting-Balance Support  Right upper extremity supported;Left upper extremity supported  -AN    Sitting-Balance Activities  Lateral lean;Forward lean  -AN    Standing-Level of Assistance  Moderate assistance;x2  -AN    Static Standing Balance Support  assistive device  -AN    Standing-Balance Activities  Weight Shift R-L  -AN    Standing Balance # of Minutes  2  -AN    Therapy Exercises    Left Lower Extremity  10 reps;sitting;glut sets;quad sets;AAROM:;SAQ;hip abduction/adduction  -LS    Sensory Assessment/Intervention    Sensory Impairment --   absent sensation to deep pressure R foot.  -LS     Light Touch --   absent to light touch R lower leg;diminished R thigh  -LS     Deep Pressure --   intact to deep pressure R thigh & lower leg  -LS     Positioning and Restraints    Pre-Treatment Position in bed  -LS in bed  -AN    Post Treatment Position chair  -LS chair   -AN    In Chair notified nsg;sitting;call light within reach;with family/caregiver;encouraged to call for assist;heels elevated  - reclined;call light within reach;encouraged to call for assist;with family/caregiver  -AN      03/02/17 1707 03/01/17 1348    General Information    Equipment Currently Used at Home walker, rolling;commode;cane, straight  -CR commode;cane, straight;walker, rolling;wheelchair;raised toilet;shower chair  -SG    Living Environment    Lives With spouse  -CR spouse  -SG    Living Arrangements apartment  -CR     Home Accessibility no concerns  -CR     Stair Railings at Home none  -CR     Type of Financial/Environmental Concern none  -CR     Transportation Available car;family or friend will provide  -CR car;family or friend will provide  -SG      User Key  (r) = Recorded By, (t) = Taken By, (c) = Cosigned By    Initials Name Provider Type    AN Fela Anguiano, OT Occupational Therapist     Ailyn Marcus, PT Physical Therapist    SG Christelle Cuevas     CR Radames Moser RN Registered Nurse          Physical Therapy Education     Title: PT OT SLP Therapies (Active)     Topic: Physical Therapy (Active)     Point: Precautions (Done)    Learning Progress Summary    Learner Readiness Method Response Comment Documented by Status   Patient Acceptance E VU,NR educated pt to maintain L knee extension while lying in bed. also instructed pt to work on aps, QS, & GS X 10 reps 3X/day.  03/04/17 1246 Done                      User Key     Initials Effective Dates Name Provider Type Discipline     06/19/15 -  Ailyn Marcus, PT Physical Therapist PT                PT Recommendation and Plan  Anticipated Discharge Disposition: inpatient rehabilitation facility  Planned Therapy Interventions: balance training, bed mobility training, gait training, home exercise program, patient/family education, strengthening, transfer training  PT Frequency: daily, per priority  policy  Plan of Care Review  Plan Of Care Reviewed With: patient, spouse  Outcome Summary/Follow up Plan: Pt presents with significant deficits in mobility. He has weakness of BUEs & his RLE & was unable to take steps forward using a RW. He is fearful of falling but has poor safety awareness. Pt would benefit from skilled PT services to address mobility deficits and improve function to maximize his level of independence. Pt would benefit from inpatient rehab after discharge from acute care hospital.          IP PT Goals       03/04/17 1249          Bed Mobility PT LTG    Bed Mobility PT LTG, Date Established 03/04/17  -LS      Bed Mobility PT LTG, Time to Achieve 2 wks  -LS      Bed Mobility PT LTG, Activity Type supine to sit/sit to supine  -LS      Bed Mobility PT LTG, [place] Level minimum assist (75% patient effort)  -LS      Bed Mobility PT LTG, Outcome goal ongoing  -LS      Transfer Training PT LTG    Transfer Training PT LTG, Date Established 03/04/17  -LS      Transfer Training PT LTG, Time to Achieve 2 wks  -LS      Transfer Training PT LTG, Activity Type sit to stand/stand to sit  -LS      Transfer Training PT LTG, [place] Level minimum assist (75% patient effort)  -LS      Transfer Training PT LTG, Outcome goal ongoing  -LS      Gait Training PT LTG    Gait Training Goal PT LTG, Date Established 03/04/17  -LS      Gait Training Goal PT LTG, Time to Achieve 2 wks  -LS      Gait Training Goal PT LTG, [place] Level minimum assist (75% patient effort)  -LS      Gait Training Goal PT LTG, Assist Device walker, rolling  -LS      Gait Training Goal PT LTG, Distance to Achieve 50  -LS      Gait Training Goal PT LTG, Outcome goal ongoing  -LS        User Key  (r) = Recorded By, (t) = Taken By, (c) = Cosigned By    Initials Name Provider Type    EKTA Marcus, PT Physical Therapist                Outcome Measures       03/04/17 1015 03/04/17 1011       How much help from another person do  you currently need...    Turning from your back to your side while in flat bed without using bedrails? 2  -LS      Moving from lying on back to sitting on the side of a flat bed without bedrails? 2  -LS      Moving to and from a bed to a chair (including a wheelchair)? 2  -LS      Standing up from a chair using your arms (e.g., wheelchair, bedside chair)? 2  -LS      Climbing 3-5 steps with a railing? 1  -LS      To walk in hospital room? 1  -LS      AM-PAC 6 Clicks Score 10  -LS      How much help from another is currently needed...    Putting on and taking off regular lower body clothing?  2  -AN     Bathing (including washing, rinsing, and drying)  2  -AN     Toileting (which includes using toilet bed pan or urinal)  2  -AN     Putting on and taking off regular upper body clothing  2  -AN     Taking care of personal grooming (such as brushing teeth)  3  -AN     Eating meals  4  -AN     Score  15  -AN     Functional Assessment    Outcome Measure Options AM-PAC 6 Clicks Basic Mobility (PT)  -LS AM-PAC 6 Clicks Daily Activity (OT)  -AN       User Key  (r) = Recorded By, (t) = Taken By, (c) = Cosigned By    Initials Name Provider Type    AN Fela Anguiano, OT Occupational Therapist    EKTA Marcus, CANDY Physical Therapist           Time Calculation:         PT Charges       03/04/17 1254          Time Calculation    Start Time 1015   tcc: 10min  -      PT Received On 03/04/17  -      PT Goal Re-Cert Due Date 03/14/17  -        User Key  (r) = Recorded By, (t) = Taken By, (c) = Cosigned By    Initials Name Provider Type    EKTA Marcus PT Physical Therapist          Therapy Charges for Today     Code Description Service Date Service Provider Modifiers Qty    87697423839 HC PT THER PROC EA 15 MIN 3/4/2017 Ailyn Marcus, PT GP 1    97195017529 HC PT EVAL MOD COMPLEXITY 4 3/4/2017 Ailyn Marcus, PT GP 1          PT G-Codes  Outcome Measure Options: AM-PAC 6 Clicks Basic Mobility  (PT)      Ailyn Marcus, PT  3/4/2017

## 2017-03-05 LAB
ALBUMIN SERPL-MCNC: 2.9 G/DL (ref 3.2–4.8)
ALBUMIN/GLOB SERPL: 0.8 G/DL (ref 1.5–2.5)
ALP SERPL-CCNC: 256 U/L (ref 25–100)
ALT SERPL W P-5'-P-CCNC: 42 U/L (ref 7–40)
ANION GAP SERPL CALCULATED.3IONS-SCNC: 6 MMOL/L (ref 3–11)
AST SERPL-CCNC: 50 U/L (ref 0–33)
BILIRUB SERPL-MCNC: 0.3 MG/DL (ref 0.3–1.2)
BUN BLD-MCNC: 16 MG/DL (ref 9–23)
BUN/CREAT SERPL: 32 (ref 7–25)
CALCIUM SPEC-SCNC: 8 MG/DL (ref 8.7–10.4)
CHLORIDE SERPL-SCNC: 103 MMOL/L (ref 99–109)
CK SERPL-CCNC: 154 U/L (ref 26–174)
CO2 SERPL-SCNC: 27 MMOL/L (ref 20–31)
CREAT BLD-MCNC: 0.5 MG/DL (ref 0.6–1.3)
DEPRECATED RDW RBC AUTO: 44.5 FL (ref 37–54)
ERYTHROCYTE [DISTWIDTH] IN BLOOD BY AUTOMATED COUNT: 14.7 % (ref 11.3–14.5)
GFR SERPL CREATININE-BSD FRML MDRD: >150 ML/MIN/1.73
GLOBULIN UR ELPH-MCNC: 3.5 GM/DL
GLUCOSE BLD-MCNC: 167 MG/DL (ref 70–100)
GLUCOSE BLDC GLUCOMTR-MCNC: 126 MG/DL (ref 70–130)
GLUCOSE BLDC GLUCOMTR-MCNC: 177 MG/DL (ref 70–130)
GLUCOSE BLDC GLUCOMTR-MCNC: 181 MG/DL (ref 70–130)
HCT VFR BLD AUTO: 29.5 % (ref 38.9–50.9)
HGB BLD-MCNC: 9.6 G/DL (ref 13.1–17.5)
MCH RBC QN AUTO: 26.9 PG (ref 27–31)
MCHC RBC AUTO-ENTMCNC: 32.5 G/DL (ref 32–36)
MCV RBC AUTO: 82.6 FL (ref 80–99)
PLATELET # BLD AUTO: 252 10*3/MM3 (ref 150–450)
PMV BLD AUTO: 10.7 FL (ref 6–12)
POTASSIUM BLD-SCNC: 3.8 MMOL/L (ref 3.5–5.5)
PROT SERPL-MCNC: 6.4 G/DL (ref 5.7–8.2)
RBC # BLD AUTO: 3.57 10*6/MM3 (ref 4.2–5.76)
SODIUM BLD-SCNC: 136 MMOL/L (ref 132–146)
WBC NRBC COR # BLD: 10.18 10*3/MM3 (ref 3.5–10.8)

## 2017-03-05 PROCEDURE — 82962 GLUCOSE BLOOD TEST: CPT

## 2017-03-05 PROCEDURE — 25010000002 MORPHINE SULFATE (PF) 2 MG/ML SOLUTION: Performed by: FAMILY MEDICINE

## 2017-03-05 PROCEDURE — 63710000001 INSULIN DETEMIR PER 5 UNITS: Performed by: PHYSICIAN ASSISTANT

## 2017-03-05 PROCEDURE — 25010000002 DAPTOMYCIN PER 1 MG: Performed by: INTERNAL MEDICINE

## 2017-03-05 PROCEDURE — 80053 COMPREHEN METABOLIC PANEL: CPT | Performed by: INTERNAL MEDICINE

## 2017-03-05 PROCEDURE — 99024 POSTOP FOLLOW-UP VISIT: CPT | Performed by: THORACIC SURGERY (CARDIOTHORACIC VASCULAR SURGERY)

## 2017-03-05 PROCEDURE — 82550 ASSAY OF CK (CPK): CPT | Performed by: INTERNAL MEDICINE

## 2017-03-05 PROCEDURE — 99232 SBSQ HOSP IP/OBS MODERATE 35: CPT | Performed by: FAMILY MEDICINE

## 2017-03-05 PROCEDURE — 85027 COMPLETE CBC AUTOMATED: CPT | Performed by: INTERNAL MEDICINE

## 2017-03-05 PROCEDURE — 25010000002 PIPERACILLIN-TAZOBACTAM: Performed by: INTERNAL MEDICINE

## 2017-03-05 RX ADMIN — TAZOBACTAM SODIUM AND PIPERACILLIN SODIUM 4.5 G: .5; 4 INJECTION, POWDER, LYOPHILIZED, FOR SOLUTION INTRAVENOUS at 17:01

## 2017-03-05 RX ADMIN — DAPTOMYCIN 700 MG: 500 INJECTION, POWDER, LYOPHILIZED, FOR SOLUTION INTRAVENOUS at 11:56

## 2017-03-05 RX ADMIN — TAZOBACTAM SODIUM AND PIPERACILLIN SODIUM 4.5 G: .5; 4 INJECTION, POWDER, LYOPHILIZED, FOR SOLUTION INTRAVENOUS at 05:07

## 2017-03-05 RX ADMIN — SODIUM CHLORIDE 100 ML/HR: 900 INJECTION INTRAVENOUS at 17:03

## 2017-03-05 RX ADMIN — FAMOTIDINE 20 MG: 20 TABLET ORAL at 09:22

## 2017-03-05 RX ADMIN — ASPIRIN 81 MG: 81 TABLET, COATED ORAL at 09:22

## 2017-03-05 RX ADMIN — HYDROCODONE BITARTRATE AND ACETAMINOPHEN 1 TABLET: 10; 325 TABLET ORAL at 05:49

## 2017-03-05 RX ADMIN — DOCUSATE SODIUM 100 MG: 100 CAPSULE, LIQUID FILLED ORAL at 09:21

## 2017-03-05 RX ADMIN — LOSARTAN POTASSIUM 100 MG: 50 TABLET, FILM COATED ORAL at 10:57

## 2017-03-05 RX ADMIN — INSULIN DETEMIR 30 UNITS: 100 INJECTION, SOLUTION SUBCUTANEOUS at 21:17

## 2017-03-05 RX ADMIN — FAMOTIDINE 20 MG: 20 TABLET ORAL at 18:15

## 2017-03-05 RX ADMIN — TAZOBACTAM SODIUM AND PIPERACILLIN SODIUM 4.5 G: .5; 4 INJECTION, POWDER, LYOPHILIZED, FOR SOLUTION INTRAVENOUS at 10:57

## 2017-03-05 RX ADMIN — GABAPENTIN 1200 MG: 300 CAPSULE ORAL at 21:11

## 2017-03-05 RX ADMIN — Medication 1 CAPSULE: at 09:21

## 2017-03-05 RX ADMIN — HYDROCODONE BITARTRATE AND ACETAMINOPHEN 1 TABLET: 10; 325 TABLET ORAL at 09:31

## 2017-03-05 RX ADMIN — INSULIN LISPRO 2 UNITS: 100 INJECTION, SOLUTION INTRAVENOUS; SUBCUTANEOUS at 09:22

## 2017-03-05 RX ADMIN — GABAPENTIN 1200 MG: 300 CAPSULE ORAL at 05:48

## 2017-03-05 RX ADMIN — TAZOBACTAM SODIUM AND PIPERACILLIN SODIUM 4.5 G: .5; 4 INJECTION, POWDER, LYOPHILIZED, FOR SOLUTION INTRAVENOUS at 21:11

## 2017-03-05 RX ADMIN — HYDROCODONE BITARTRATE AND ACETAMINOPHEN 1 TABLET: 10; 325 TABLET ORAL at 17:00

## 2017-03-05 RX ADMIN — GABAPENTIN 1200 MG: 300 CAPSULE ORAL at 14:08

## 2017-03-05 RX ADMIN — INSULIN LISPRO 2 UNITS: 100 INJECTION, SOLUTION INTRAVENOUS; SUBCUTANEOUS at 21:11

## 2017-03-05 RX ADMIN — MORPHINE SULFATE 1 MG: 2 INJECTION, SOLUTION INTRAMUSCULAR; INTRAVENOUS at 18:15

## 2017-03-05 NOTE — PROGRESS NOTES
[facility] Medicine Services    ASSESSMENT / PLAN             Sepsis, secondary to left foot infection, chronic foot ulcers, s/p recent 3rd-5th left metatarsal amputations by Dr. Martinez 1/30/17 , resolving   POD # 3 s/p L BKA  DUNLAP Cirrhosis  Essential hypertension, stable  Non-compliance  Hyperlipemia  Poorly controlled type 2 diabetes mellitus with peripheral neuropathy.   Lab Results   Component Value Date    HGBA1C 9.70 (H) 02/28/2017     SHMUEL negative for IE/vegetations   Osteomyelitis of left foot  Diabetic foot infection  Diabetic neuropathy     Plan--  Plan--  Pod 3 L BKA by  Dr Martinez    Afebrile today and leukocytosis resolved  , b/c mrsa   Continue antibiotics, , and discussed with  patient and wife at the bedside   Echo reviewed Continue Levemir as well as pre-meal insulin. Labile BS  Elevated ESR, CRP,  Per ID possible discharge tomorrow to rtehab   Length of Stay 5 Days   Code- full code  DVT Prophylaxis- Lovenox  Condition--serious  Prognosis-- guarded, poor long-term  Expected Discharge disposition in        1-2 days   Days to rehabilitation     SUBJECTIVE--HPI/ Events overnight / CC- Hospital Follow up visit/ ROS-not detailed ,  as performed below    Lying in bed , slept better with ambien, no other problems, phantomn pain better     Says lot of phantom pain Gen -no fevers, chills  CV-no chest pain, palpitations  Resp-no cough, dyspnea  GI-no N/V/D, abd pain      OBJECTIVE        Vital Sign Min/Max for last 24 hours  Temp  Min: 97.7 °F (36.5 °C)  Max: 98.7 °F (37.1 °C)   BP  Min: 154/113  Max: 164/106   Pulse  Min: 96  Max: 100   Resp  Min: 18  Max: 18   No Data Recorded   No Data Recorded      Intake/Output Summary (Last 24 hours) at 03/05/17 1317  Last data filed at 03/05/17 1156   Gross per 24 hour   Intake    250 ml   Output      0 ml   Net    250 ml           Gen/Constitutional-no acute distress  CV-RRR, S1 S2 normal, 2/6 systolic murmur  Resp-CTAB, no  wheezes  Abd-soft, NT, ND, +BS  Ext-no edema, left BKA stump under dressing Neuro-A&Ox3, no focal deficits  Flat mood, not much interested in conversation   Skin, no new rashes over last 24 hours    Medications    Current Facility-Administered Medications:   •  acetaminophen (TYLENOL) tablet 650 mg, 650 mg, Oral, Q4H PRN, Melisa Boles MD, 650 mg at 03/01/17 2119  •  aspirin EC tablet 81 mg, 81 mg, Oral, Daily, Melisa Boles MD, 81 mg at 03/05/17 0922  •  DAPTOmycin (CUBICIN) 700 mg in sodium chloride 0.9 % 50 mL IVPB, 700 mg, Intravenous, Q24H, Usman Dong MD, Last Rate: 100 mL/hr at 03/05/17 1156, 700 mg at 03/05/17 1156  •  dextrose (D50W) solution 25 g, 25 g, Intravenous, Q15 Min PRN, Melisa Boles MD  •  dextrose (GLUTOSE) oral gel 15 g, 15 g, Oral, Q15 Min PRN, Melisa Boles MD  •  docusate sodium (COLACE) capsule 100 mg, 100 mg, Oral, BID, Melisa Boles MD, 100 mg at 03/05/17 0921  •  famotidine (PEPCID) tablet 20 mg, 20 mg, Oral, BID, Melisa Boles MD, 20 mg at 03/05/17 0922  •  gabapentin (NEURONTIN) capsule 1,200 mg, 1,200 mg, Oral, Q8H, Melisa Boles MD, 1,200 mg at 03/05/17 0548  •  glucagon (GLUCAGEN) injection 1 mg, 1 mg, Subcutaneous, Q15 Min PRN, Melisa Boles MD  •  HYDROcodone-acetaminophen (NORCO)  MG per tablet 1 tablet, 1 tablet, Oral, Q6H PRN, Unruly Lopez MD, 1 tablet at 03/05/17 0931  •  insulin detemir (LEVEMIR) injection 30 Units, 30 Units, Subcutaneous, Nightly, Casie M Mayne, PA, 30 Units at 03/04/17 2206  •  insulin lispro (humaLOG) injection 0-9 Units, 0-9 Units, Subcutaneous, 4x Daily With Meals & Nightly, Melisa Boles MD, 2 Units at 03/05/17 0922  •  losartan (COZAAR) tablet 100 mg, 100 mg, Oral, Q24H, Melisa Boles MD, 100 mg at 03/05/17 1057  •  magnesium sulfate in D5W 1g/100mL (PREMIX) IVPB 1 g, 1 g, Intravenous, PRN **OR** magnesium sulfate 8 g in dextrose (D5W) 5 % 250 mL infusion, 8 g, Intravenous, PRN **OR** magnesium sulfate 6 g in dextrose (D5W) 5 % 250 mL infusion, 6 g,  Intravenous, PRN **OR** magnesium sulfate in D5W 1g/100mL (PREMIX) IVPB 1 g, 1 g, Intravenous, PRN, Unruly Lopez MD  •  Morphine sulfate (PF) injection 1 mg, 1 mg, Intravenous, Q4H PRN, 1 mg at 03/04/17 1331 **AND** naloxone (NARCAN) injection 0.4 mg, 0.4 mg, Intravenous, Q5 Min PRN, Melisa Boles MD  •  ondansetron (ZOFRAN) injection 4 mg, 4 mg, Intravenous, Q6H PRN, Melisa Boles MD, 4 mg at 03/01/17 0944  •  piperacillin-tazobactam (ZOSYN) 4.5 g/100 mL 0.9% NS IVPB (mbp), 4.5 g, Intravenous, Q6H, Usman Dong MD, 4.5 g at 03/05/17 1057  •  polyethylene glycol (MIRALAX) powder 17 g, 17 g, Oral, Daily, Melisa Boles MD, 17 g at 02/28/17 2008  •  probiotic (CULTURELLE) capsule 1 capsule, 1 capsule, Oral, Daily, Melisa Boles MD, 1 capsule at 03/05/17 0921  •  sodium chloride 0.9 % flush 1-10 mL, 1-10 mL, Intravenous, PRN, Mleisa Boles MD  •  sodium chloride 0.9 % flush 10 mL, 10 mL, Intravenous, PRN, Carlos Brothers MD  •  sodium chloride 0.9 % infusion, 100 mL/hr, Intravenous, Continuous, Melisa Boles MD, Last Rate: 100 mL/hr at 03/03/17 2111, 100 mL/hr at 03/03/17 2111  •  zolpidem (AMBIEN) tablet 5 mg, 5 mg, Oral, Nightly PRN, Unruly Lopez MD  I have reviewed the labs, culture data, radiology results, and diagnostic studies.    Results from last 7 days  Lab Units 03/05/17  0715 03/04/17  0726 03/02/17  0908   SODIUM mmol/L 136 132 133   POTASSIUM mmol/L 3.8 3.8 3.7   CHLORIDE mmol/L 103 101 102   TOTAL CO2 mmol/L 27.0 25.0 26.0   BUN mg/dL 16 21 28*   CREATININE mg/dL 0.50* 0.50* 0.80   CALCIUM mg/dL 8.0* 7.8* 7.7*   BILIRUBIN mg/dL 0.3 0.3  --    ALK PHOS U/L 256* 175*  --    ALT (SGPT) U/L 42* 27  --    AST (SGOT) U/L 50* 30  --    GLUCOSE mg/dL 167* 194* 162*       Results from last 7 days  Lab Units 03/05/17  0715 03/04/17  0558 03/02/17  0813   WBC 10*3/mm3 10.18 10.09 12.24*   HEMOGLOBIN g/dL 9.6* 9.6* 10.8*   HEMATOCRIT % 29.5* 29.5* 32.8*   PLATELETS 10*3/mm3 252 252 272           Culture  Data:    Radiology Results:  Imaging Results (last 24 hours)     Procedure Component Value Units Date/Time    XR Foot 3+ View Left [[id number]] Collected:  03/01/17 1036     Updated:  03/01/17 1057    Narrative:       EXAMINATION: XR FOOT 3+ VW LEFT-      INDICATION: Osteomyelitis.      COMPARISON: Compared to MR datasets 01/25/2017.     FINDINGS:   1. The third, fourth and fifth digits of the left foot have been  resected from the metatarsals distally.  2. There is rarefaction and osteoporosis with cortical mineral loss of  the head of the second metatarsal, however, active focal erosion is not  identified.  3. There is degenerative disease in the midfoot structures without  erosion. Hindfoot structures are unremarkable and the left lower leg and  ankle are unremarkable.           Impression:       1. Status post amputation third, fourth and fifth digits or raise of the  left foot involving the third, fourth and fifth metatarsals and toes.  2. Active bone erosion, fracture or destructive process is not  identified from a conventional image standpoint currently.     D:  02/28/2017  E:  03/01/2017     This report was finalized on 3/1/2017 10:55 AM by Dr. Shane Villa MD.           *. Please note that portions of this note were completed with a voice recognition program. Efforts were made to edit the dictations, but occasionally words are mistranscribed.  Unruly Lopez MD03/05/171:17 PM

## 2017-03-05 NOTE — PROGRESS NOTES
[facility] Progress Note    2/28/2017      Antibiotics:  IV Anti-Infectives     Ordered     Dose/Rate Route Frequency Start Stop    03/03/17 1055  DAPTOmycin (CUBICIN) 700 mg in sodium chloride 0.9 % 50 mL IVPB     Ordering Provider:  Usman Dong MD    700 mg  100 mL/hr over 30 Minutes Intravenous Every 24 Hours 03/03/17 1200      03/01/17 0843  piperacillin-tazobactam (ZOSYN) 4.5 g/100 mL 0.9% NS IVPB (mbp)     Ordering Provider:  Usman Dong MD    4.5 g Intravenous Every 6 Hours 03/01/17 1000      03/01/17 0848  vancomycin 2000 mg/500 mL 0.9% NS IVPB ([facility])     Ordering Provider:  Juancho Kathleen RPH    2,000 mg  over 120 Minutes Intravenous Once 03/01/17 0930 03/01/17 1115    02/28/17 1753  DAPTOmycin (CUBICIN) 450 mg in sodium chloride 0.9 % 50 mL IVPB     Benito Allison [facility] reviewed the order on 02/28/17 1936.   Ordering Provider:  Usman Dong MD    450 mg  100 mL/hr over 30 Minutes Intravenous Every 24 Hours 02/28/17 1800 02/28/17 2040    02/28/17 1753  cefTRIAXone (ROCEPHIN) IVPB 1 g     Ordering Provider:  Carlos Brothers MD    1 g  over 30 Minutes Intravenous Once 02/28/17 1755 02/28/17 1842          CC:sepsis, bacteremia, foot osteo, noncompliance    HPI:  Patient is a 46 y.o. Yr old male with history of uncontrolled diabetes with extensive comorbidity as previously outlined. He has lower extremity vascular disease but no specific option for revascularization per discussion with Dr. Martinez. He was admitted January 2017 with left foot infection, MRI not confirming osteomyelitis, but had surgery on January 30 with metatarsals 3/4/5 amputated, marginal perfusion per operative note and MRSA/GB strep/coag-negative staph in his various cultures. He was transferred to [facility] on broad-spectrum IV antibiotics. While at [facility], he was under the care of Dr. Herrera and, per family/patient, he was told that he required hyperbaric oxygen therapy and had very little chance for long-term  "healing at the foot. He was readmitted on February 13 to [facility] with patient initially interested in having higher level amputation. After discussion with Dr. Martinez, he changed his mind and wanted attempt at salvage again with IV antibiotics/wound care. He was discharged February 17 as outlined in our prior inpatient notes. He was noncompliant with follow-up in the office the week of February 20 despite our calls to the patient. He then stopped antibiotics on his own Wednesday, February 22 and did not notify us. His family was able to get him to come into the office on February 27 at which point he refused IV antibiotics/insisted PICC line be removed and he refused hospitalization/VAC,  understanding the risks of his behavior/decisions as outlined in my office note. He was agreeable for prescription of oral agents and went home to consider his options. On February 28, he called and said he was willing to come to the hospital. He presented to the emergency room, found to have fever/leukocytosis consistent with sepsis and subsequently found to be bacteremic with MRSA.      3/5/17 no fever, no new focal pain and generally stable respiratory status with stable hemodynamics.  Denies any new extremity pain.  No chest pain or palpitation.  No headache photophobia or neck stiffness.  No shortness of breath or cough    ROS:      3/5/17No n/v/d. No rash. No new ADR to Abx.   no focal weakness or description of myalgia.  Otherwise complete review of systems unable to obtain    All Other systems negative for acute complaints on a 12 system review of systems          PE:   Visit Vitals   • BP (!) 164/106 (BP Location: Right arm, Patient Position: Lying)   • Pulse 96   • Temp 97.7 °F (36.5 °C) (Oral)   • Resp 18   • Ht 75\" (190.5 cm)   • Wt 261 lb (118 kg)   • SpO2 97%   • BMI 32.62 kg/m2       GENERAL: Sleepy, in no acute distress.   HEENT: Normocephalic, atraumatic.  PERRL. EOMI. No conjunctival injection. No icterus. " Oropharynx clear without evidence of thrush or exudate. No evidence of peridontal disease.    NECK: Supple without nuchal rigidity. No mass.  LYMPH: No cervical, axillary or inguinal lymphadenopathy.  HEART: RRR; No murmur, rubs, gallops.   LUNGS: Clear to auscultation bilaterally without wheezing, rales, rhonchi. Normal respiratory effort. Nonlabored. No dullness.  ABDOMEN: Soft, nontender, nondistended. Positive bowel sounds. No rebound or guarding. NO mass or HSM.  EXT:  No cyanosis, clubbing or edema. No cord.  : Genitalia generally unremarkable.  Without Walker catheter.  MSK: FROM without joint effusions noted arms/legs.    SKIN: Warm and dry without cutaneous eruptions on Inspection/palpation aside from below.    NEURO: Oriented to PPT. No focal deficits on motor/sensory exam at arms/legs.    Left BKA noted; no new purulence or redness     No peripheral stigmata/phenomena of endocarditis    Laboratory Data      Results from last 7 days  Lab Units 03/05/17  0715 03/04/17  0558 03/02/17  0813   WBC 10*3/mm3 10.18 10.09 12.24*   HEMOGLOBIN g/dL 9.6* 9.6* 10.8*   HEMATOCRIT % 29.5* 29.5* 32.8*   PLATELETS 10*3/mm3 252 252 272       Results from last 7 days  Lab Units 03/05/17  0715   SODIUM mmol/L 136   POTASSIUM mmol/L 3.8   CHLORIDE mmol/L 103   TOTAL CO2 mmol/L 27.0   BUN mg/dL 16   CREATININE mg/dL 0.50*   GLUCOSE mg/dL 167*   CALCIUM mg/dL 8.0*       Results from last 7 days  Lab Units 03/05/17  0715   ALK PHOS U/L 256*   BILIRUBIN mg/dL 0.3   ALT (SGPT) U/L 42*   AST (SGOT) U/L 50*       Results from last 7 days  Lab Units 02/28/17  1553   SED RATE mm/hr 127*       Results from last 7 days  Lab Units 02/28/17  1553   CRP mg/dL 135.10*       Estimated Creatinine Clearance: 250.2 mL/min (by C-G formula based on Cr of 0.5).      Microbiology:      Radiology:  Imaging Results (last 72 hours)     Procedure Component Value Units Date/Time    XR Foot 3+ View Left [[id number]] Collected:  03/01/17 1036     Updated:   03/01/17 1057    Narrative:       EXAMINATION: XR FOOT 3+ VW LEFT-      INDICATION: Osteomyelitis.      COMPARISON: Compared to MR datasets 01/25/2017.     FINDINGS:   1. The third, fourth and fifth digits of the left foot have been  resected from the metatarsals distally.  2. There is rarefaction and osteoporosis with cortical mineral loss of  the head of the second metatarsal, however, active focal erosion is not  identified.  3. There is degenerative disease in the midfoot structures without  erosion. Hindfoot structures are unremarkable and the left lower leg and  ankle are unremarkable.           Impression:       1. Status post amputation third, fourth and fifth digits or raise of the  left foot involving the third, fourth and fifth metatarsals and toes.  2. Active bone erosion, fracture or destructive process is not  identified from a conventional image standpoint currently.     D:  02/28/2017  E:  03/01/2017     This report was finalized on 3/1/2017 10:55 AM by Dr. Shane Villa MD.               Impression:   --MRSA bacteremia/sepsis. Likely associated with his left foot osteomyelitis/infection, with general progression of illness likely as a direct result of his medical noncompliance and recent refusal of care. He and his family understand the implication of his recent behavior. Dapto EYAD = 1 and vanco eyad=2 per micro;  Change to dapto.  Repeat cultures pending     --Left foot gangrene/osteomyelitis with progression as above. BKA done     --Medical noncompliance as above     --Diabetes type 2. You need to tightly control blood sugar to give best chance for healing     --Peripheral vascular disease as previously outlined     --Heart murmur by exam associated with the MRSA bacteremia. SHMUEL neg for vegetation per Dr Martinez.  TTE no vegetation described by cardio    --Abnormal CPK.  Trended down.  Possibly related to surgery. Monitor.  Asymptomatic per nursing with no description of myalgia/focal ext  weakness    PLAN:    --IV daptomycin/Zosyn     --I discussed potential risks and benefits of the prescribed antibiotics that include, but are not limited to, solid organ toxicity, neuro/bala/vestibular toxicity, renal toxicity, CDiff, cytopenias, hypersensitivity, pulm/muscle tox etc.. Patient/Family voice understanding and agree to proceed.     --Monitor IV and IV antibiotics with risk for systemic complication and potential drug interaction     --Check/review labs cultures and scans     --Highly complex set of issues with high risk for further serious morbidity and other serious sequela     --Discussed with patient's family, Dr. Martinez, and Dr. Lopez, and microbiology.     --d/w Dr Martinez; Dr. Martinez reports to me directly SHMUEL neg for vegetation    --surgery 3/2    Usman Dong MD  3/5/2017

## 2017-03-05 NOTE — PROGRESS NOTES
Cardiothoracic Surgery Progress Note      POD # 3 s/p left BKA       LOS: 5 days      Subjective:  Doing well, has no new complaints      Objective:  Vital Signs  Temp:  [97.7 °F (36.5 °C)-98.7 °F (37.1 °C)] 97.7 °F (36.5 °C)  Heart Rate:  [] 96  Resp:  [18] 18  BP: (154-164)/(106-113) 164/106    Physical Exam:   General Appearance: alert, appears stated age and cooperative   Lungs: clear to auscultation, respirations regular, respirations even and respirations unlabored   Heart: regular rhythm & normal rate, normal S1, S2, no murmur, no clau, no rub and no click   Skin: Incision c/d/i     Results:    Results from last 7 days  Lab Units 03/05/17  0715   WBC 10*3/mm3 10.18   HEMOGLOBIN g/dL 9.6*   HEMATOCRIT % 29.5*   PLATELETS 10*3/mm3 252       Results from last 7 days  Lab Units 03/04/17  0726   SODIUM mmol/L 132   POTASSIUM mmol/L 3.8   CHLORIDE mmol/L 101   TOTAL CO2 mmol/L 25.0   BUN mg/dL 21   CREATININE mg/dL 0.50*   GLUCOSE mg/dL 194*   CALCIUM mg/dL 7.8*         Assessment:  Status post left BKA    Plan:  continue antibiotics, await rehabilitation placement    Fadi Saldana PA-C  03/05/17  8:02 AM

## 2017-03-05 NOTE — PLAN OF CARE
Problem: Patient Care Overview (Adult)  Goal: Plan of Care Review  Outcome: Ongoing (interventions implemented as appropriate)  Goal: Adult Individualization and Mutuality  Outcome: Ongoing (interventions implemented as appropriate)  Goal: Discharge Needs Assessment  Outcome: Ongoing (interventions implemented as appropriate)    Problem: Infection, Risk/Actual (Adult)  Goal: Identify Related Risk Factors and Signs and Symptoms  Outcome: Ongoing (interventions implemented as appropriate)  Goal: Infection Prevention/Resolution  Outcome: Ongoing (interventions implemented as appropriate)

## 2017-03-06 LAB
ANION GAP SERPL CALCULATED.3IONS-SCNC: 4 MMOL/L (ref 3–11)
BASOPHILS # BLD AUTO: 0.03 10*3/MM3 (ref 0–0.2)
BASOPHILS NFR BLD AUTO: 0.3 % (ref 0–1)
BUN BLD-MCNC: 13 MG/DL (ref 9–23)
BUN/CREAT SERPL: 26 (ref 7–25)
CALCIUM SPEC-SCNC: 7.7 MG/DL (ref 8.7–10.4)
CHLORIDE SERPL-SCNC: 105 MMOL/L (ref 99–109)
CO2 SERPL-SCNC: 28 MMOL/L (ref 20–31)
CREAT BLD-MCNC: 0.5 MG/DL (ref 0.6–1.3)
DEPRECATED RDW RBC AUTO: 44.8 FL (ref 37–54)
EOSINOPHIL # BLD AUTO: 0.4 10*3/MM3 (ref 0.1–0.3)
EOSINOPHIL NFR BLD AUTO: 3.5 % (ref 0–3)
ERYTHROCYTE [DISTWIDTH] IN BLOOD BY AUTOMATED COUNT: 14.8 % (ref 11.3–14.5)
GFR SERPL CREATININE-BSD FRML MDRD: >150 ML/MIN/1.73
GLUCOSE BLD-MCNC: 155 MG/DL (ref 70–100)
GLUCOSE BLDC GLUCOMTR-MCNC: 147 MG/DL (ref 70–130)
GLUCOSE BLDC GLUCOMTR-MCNC: 199 MG/DL (ref 70–130)
GLUCOSE BLDC GLUCOMTR-MCNC: 229 MG/DL (ref 70–130)
GLUCOSE BLDC GLUCOMTR-MCNC: 241 MG/DL (ref 70–130)
HCT VFR BLD AUTO: 29.1 % (ref 38.9–50.9)
HGB BLD-MCNC: 9.2 G/DL (ref 13.1–17.5)
IMM GRANULOCYTES # BLD: 0.16 10*3/MM3 (ref 0–0.03)
IMM GRANULOCYTES NFR BLD: 1.4 % (ref 0–0.6)
LYMPHOCYTES # BLD AUTO: 1.95 10*3/MM3 (ref 0.6–4.8)
LYMPHOCYTES NFR BLD AUTO: 16.9 % (ref 24–44)
MAGNESIUM SERPL-MCNC: 1.7 MG/DL (ref 1.3–2.7)
MCH RBC QN AUTO: 26.2 PG (ref 27–31)
MCHC RBC AUTO-ENTMCNC: 31.6 G/DL (ref 32–36)
MCV RBC AUTO: 82.9 FL (ref 80–99)
MONOCYTES # BLD AUTO: 0.79 10*3/MM3 (ref 0–1)
MONOCYTES NFR BLD AUTO: 6.8 % (ref 0–12)
NEUTROPHILS # BLD AUTO: 8.24 10*3/MM3 (ref 1.5–8.3)
NEUTROPHILS NFR BLD AUTO: 71.1 % (ref 41–71)
PLAT MORPH BLD: NORMAL
PLATELET # BLD AUTO: 258 10*3/MM3 (ref 150–450)
PMV BLD AUTO: 10.4 FL (ref 6–12)
POTASSIUM BLD-SCNC: 3.8 MMOL/L (ref 3.5–5.5)
RBC # BLD AUTO: 3.51 10*6/MM3 (ref 4.2–5.76)
RBC MORPH BLD: NORMAL
SODIUM BLD-SCNC: 137 MMOL/L (ref 132–146)
WBC MORPH BLD: NORMAL
WBC NRBC COR # BLD: 11.57 10*3/MM3 (ref 3.5–10.8)

## 2017-03-06 PROCEDURE — 25010000002 DAPTOMYCIN PER 1 MG: Performed by: INTERNAL MEDICINE

## 2017-03-06 PROCEDURE — 97530 THERAPEUTIC ACTIVITIES: CPT

## 2017-03-06 PROCEDURE — 25010000002 PIPERACILLIN-TAZOBACTAM: Performed by: INTERNAL MEDICINE

## 2017-03-06 PROCEDURE — 80048 BASIC METABOLIC PNL TOTAL CA: CPT | Performed by: FAMILY MEDICINE

## 2017-03-06 PROCEDURE — 82962 GLUCOSE BLOOD TEST: CPT

## 2017-03-06 PROCEDURE — 25010000002 MORPHINE SULFATE (PF) 2 MG/ML SOLUTION: Performed by: FAMILY MEDICINE

## 2017-03-06 PROCEDURE — 02HV33Z INSERTION OF INFUSION DEVICE INTO SUPERIOR VENA CAVA, PERCUTANEOUS APPROACH: ICD-10-PCS | Performed by: FAMILY MEDICINE

## 2017-03-06 PROCEDURE — 99231 SBSQ HOSP IP/OBS SF/LOW 25: CPT | Performed by: NURSE PRACTITIONER

## 2017-03-06 PROCEDURE — 85025 COMPLETE CBC W/AUTO DIFF WBC: CPT | Performed by: FAMILY MEDICINE

## 2017-03-06 PROCEDURE — 85007 BL SMEAR W/DIFF WBC COUNT: CPT | Performed by: FAMILY MEDICINE

## 2017-03-06 PROCEDURE — 63710000001 INSULIN DETEMIR PER 5 UNITS: Performed by: PHYSICIAN ASSISTANT

## 2017-03-06 PROCEDURE — 83735 ASSAY OF MAGNESIUM: CPT | Performed by: FAMILY MEDICINE

## 2017-03-06 PROCEDURE — 97110 THERAPEUTIC EXERCISES: CPT

## 2017-03-06 RX ORDER — MAGNESIUM SULFATE HEPTAHYDRATE 40 MG/ML
4 INJECTION, SOLUTION INTRAVENOUS AS NEEDED
Status: DISCONTINUED | OUTPATIENT
Start: 2017-03-07 | End: 2017-03-07 | Stop reason: HOSPADM

## 2017-03-06 RX ORDER — HYDROCODONE BITARTRATE AND ACETAMINOPHEN 7.5; 325 MG/1; MG/1
1 TABLET ORAL EVERY 6 HOURS PRN
Qty: 30 TABLET | Refills: 0 | Status: SHIPPED | OUTPATIENT
Start: 2017-03-06 | End: 2017-03-07 | Stop reason: HOSPADM

## 2017-03-06 RX ORDER — MAGNESIUM SULFATE HEPTAHYDRATE 40 MG/ML
4 INJECTION, SOLUTION INTRAVENOUS ONCE
Status: COMPLETED | OUTPATIENT
Start: 2017-03-06 | End: 2017-03-06

## 2017-03-06 RX ADMIN — TAZOBACTAM SODIUM AND PIPERACILLIN SODIUM 4.5 G: .5; 4 INJECTION, POWDER, LYOPHILIZED, FOR SOLUTION INTRAVENOUS at 21:07

## 2017-03-06 RX ADMIN — HYDROCODONE BITARTRATE AND ACETAMINOPHEN 1 TABLET: 10; 325 TABLET ORAL at 08:59

## 2017-03-06 RX ADMIN — INSULIN LISPRO 2 UNITS: 100 INJECTION, SOLUTION INTRAVENOUS; SUBCUTANEOUS at 17:09

## 2017-03-06 RX ADMIN — TAZOBACTAM SODIUM AND PIPERACILLIN SODIUM 4.5 G: .5; 4 INJECTION, POWDER, LYOPHILIZED, FOR SOLUTION INTRAVENOUS at 03:58

## 2017-03-06 RX ADMIN — HYDROCODONE BITARTRATE AND ACETAMINOPHEN 1 TABLET: 10; 325 TABLET ORAL at 22:48

## 2017-03-06 RX ADMIN — FAMOTIDINE 20 MG: 20 TABLET ORAL at 17:09

## 2017-03-06 RX ADMIN — ASPIRIN 81 MG: 81 TABLET, COATED ORAL at 08:59

## 2017-03-06 RX ADMIN — INSULIN LISPRO 4 UNITS: 100 INJECTION, SOLUTION INTRAVENOUS; SUBCUTANEOUS at 12:26

## 2017-03-06 RX ADMIN — MAGNESIUM SULFATE HEPTAHYDRATE 4 G: 40 INJECTION, SOLUTION INTRAVENOUS at 11:25

## 2017-03-06 RX ADMIN — HYDROCODONE BITARTRATE AND ACETAMINOPHEN 1 TABLET: 10; 325 TABLET ORAL at 14:28

## 2017-03-06 RX ADMIN — TAZOBACTAM SODIUM AND PIPERACILLIN SODIUM 4.5 G: .5; 4 INJECTION, POWDER, LYOPHILIZED, FOR SOLUTION INTRAVENOUS at 17:11

## 2017-03-06 RX ADMIN — INSULIN LISPRO 4 UNITS: 100 INJECTION, SOLUTION INTRAVENOUS; SUBCUTANEOUS at 22:43

## 2017-03-06 RX ADMIN — GABAPENTIN 1200 MG: 300 CAPSULE ORAL at 14:28

## 2017-03-06 RX ADMIN — GABAPENTIN 1200 MG: 300 CAPSULE ORAL at 06:07

## 2017-03-06 RX ADMIN — LOSARTAN POTASSIUM 100 MG: 50 TABLET, FILM COATED ORAL at 08:59

## 2017-03-06 RX ADMIN — TAZOBACTAM SODIUM AND PIPERACILLIN SODIUM 4.5 G: .5; 4 INJECTION, POWDER, LYOPHILIZED, FOR SOLUTION INTRAVENOUS at 09:00

## 2017-03-06 RX ADMIN — HYDROCODONE BITARTRATE AND ACETAMINOPHEN 1 TABLET: 10; 325 TABLET ORAL at 03:15

## 2017-03-06 RX ADMIN — FAMOTIDINE 20 MG: 20 TABLET ORAL at 08:59

## 2017-03-06 RX ADMIN — INSULIN DETEMIR 30 UNITS: 100 INJECTION, SOLUTION SUBCUTANEOUS at 22:43

## 2017-03-06 RX ADMIN — GABAPENTIN 1200 MG: 300 CAPSULE ORAL at 21:07

## 2017-03-06 RX ADMIN — SODIUM CHLORIDE 100 ML/HR: 900 INJECTION INTRAVENOUS at 03:58

## 2017-03-06 RX ADMIN — Medication 1 CAPSULE: at 08:59

## 2017-03-06 RX ADMIN — DAPTOMYCIN 700 MG: 500 INJECTION, POWDER, LYOPHILIZED, FOR SOLUTION INTRAVENOUS at 12:07

## 2017-03-06 RX ADMIN — MORPHINE SULFATE 1 MG: 2 INJECTION, SOLUTION INTRAMUSCULAR; INTRAVENOUS at 22:48

## 2017-03-06 NOTE — PROGRESS NOTES
Continued Stay Note  [facility]     Patient Name: Kendrick Crystal  MRN: [id number]  Today's Date: 3/6/2017    Admit Date: 2/28/2017          Discharge Plan       03/06/17 1446    Case Management/Social Work Plan    Additional Comments Spoke with Remington at [facility].  No Medicaid beds available.  Pt/wife updated.  Plan is to pursue rehab at [facility].                Discharge Codes     None        Expected Discharge Date and Time     Expected Discharge Date Expected Discharge Time    Mar 8, 2017             Christelle Cuevas

## 2017-03-06 NOTE — PROGRESS NOTES
Continued Stay Note  [facility]     Patient Name: Kendrick Crystal  MRN: [id number]  Today's Date: 3/6/2017    Admit Date: 2/28/2017          Discharge Plan       03/06/17 1103    Case Management/Social Work Plan    Plan [facility]    Patient/Family In Agreement With Plan yes    Additional Comments Referral faxed to Health system.  Left message with [facility] to check bed availability.  Per Renee at [facility], they have a bed to offer when insurance pre-cert is approved.  They have started the process.  Discussed with pt and wife at bedside.  They prefer [facility] d/t need for long-term IV abx.  We discussed that pt can start acute rehab at [facility] and transition to Health system to complete IV abx if they have a bed available.  They are agreeable.  CM will cont to follow.              Discharge Codes     None        Expected Discharge Date and Time     Expected Discharge Date Expected Discharge Time    Mar 8, 2017             Christelle Cuevas

## 2017-03-06 NOTE — NURSING NOTE
2 attempts made to place PICC line per order. Patient want to wait until tomorrow. He has a working IV  We will try again tomorrow   Thanks   Carol gomes rn [facility]

## 2017-03-06 NOTE — PROGRESS NOTES
Adult Nutrition  Assessment/PES    Patient Name:  Kendrick Crystal  YOB: 1970  MRN: [id number]  Admit Date:  2/28/2017    Assessment Date:  3/6/2017        Reason for Assessment       03/06/17 0842    Reason for Assessment    Reason For Assessment/Visit follow up protocol    Time Spent (min) 10    Diagnosis --   per notes this adm   Patient Active Problem List   Diagnosis   • DUNLAP Cirrhosis   • Essential hypertension   • Non-compliance   • Type 2 diabetes mellitus with hyperosmolarity without nonketotic hyperglycemic-hyperosmolar coma (nkhhc)   • Arthritis   • Hyperlipemia   • Hypertriglyceridemia, essential   • Diabetic foot ulcer associated with diabetes mellitus due to underlying condition   • Cellulitis of left foot   • Poorly controlled type 2 diabetes mellitus with peripheral neuropathy   • DUNLAP (nonalcoholic steatohepatitis)   • Hyponatremia   • Neutrophilic leukocytosis   • Hypomagnesemia   • Encephalopathy, hepatic   • Hypertension   • Osteomyelitis of left foot   • Sepsis   • Diabetic foot infection   • Diabetic neuropathy                  Labs/Tests/Procedures/Meds       03/06/17 0842    Labs/Tests/Procedures/Meds    Labs/Tests Review Reviewed                Nutrition Prescription Ordered       03/06/17 0842    Nutrition Prescription PO    Current PO Diet Regular    Supplement Boost Glucose Control    Supplement Frequency 2 times a day    Common Modifiers Cardiac;Consistent Carbohydrate            Evaluation of Received Nutrient/Fluid Intake       03/06/17 0842    PO Evaluation    Number of Meals 4    % PO Intake 69              Problem/Interventions:        Problem 1       03/06/17 0843    Nutrition Diagnoses Problem 1    Problem 1 No Nutrition Diagnosis at this Time                    Intervention Goal       03/06/17 0843    Intervention Goal    General Nutrition support treatment    PO Maintain intake            Nutrition Intervention       03/06/17 0843    Nutrition Intervention     AMANDA/Tech Action Follow Tx progress;Care plan reviewd              Education/Evaluation       03/06/17 0843    Monitor/Evaluation    Monitor Per protocol          Electronically signed by:  Ernestina Herring MS RD/NEY CNSC  03/06/17 8:44 AM

## 2017-03-06 NOTE — OP NOTE
DATE OF PROCEDURE:  03/02/2017    PREOPERATIVE DIAGNOSES:  1.  Left lower extremity infection with methicillin-resistant Staphylococcus aureus, group B strep, and coagulase-negative staph.   2.  Bacteremia with methicillin-resistant Staphylococcus aureus.  3.  Medical noncompliance.   4.  Peripheral vascular disease.   5.  Hypertension.   6.  Hyperlipidemia.   7.  Diabetes mellitus.   8.  Depression.     POSTOPERATIVE DIAGNOSES:  1.  Left lower extremity infection with methicillin-resistant Staphylococcus aureus, group B strep, and coagulase-negative staph.   2.  Bacteremia with methicillin-resistant Staphylococcus aureus.  3.  Medical noncompliance.   4.  Peripheral vascular disease.   5.  Hypertension.   6.  Hyperlipidemia.   7.  Diabetes mellitus.   8.  Depression.     PROCEDURE PERFORMED: Left below the knee amputation.     SURGEON: Juancho Martinez MD     ASSISTANT:   1.  Murtaza Arriola MD   2.  Carlyle Stubbs PA-C     ANESTHESIA: General endotracheal anesthesia with Dr. Ivan Li     ESTIMATED BLOOD LOSS: 150 mL     SPECIMENS: Left lower extremity.     INDICATIONS: The patient is a 46-year-old  male well known to me for a left lower extremity plantar ulceration and cellulitis, who is status post left 3rd, 4th, and 5th transmetatarsal amputation on 011/30/2017.  The patient had uncontrolled diabetes mellitus and was transferred to [facility] for wound care. The patient was offered hyperbaric oxygen therapy and he declined. The patient also refused IV antibiotics and ultimately returned to the hospital with worsening left lower extremity infection and was found to be bacteremic with MRSA. The patient's nonhealing wound and bacteremia secondary to medical noncompliance required left below the knee amputation. The risks and benefits of the procedure were discussed with the patient including pain, bleeding, and infection. The patient understood these risks and wished to proceed with surgery.     DESCRIPTION OF  PROCEDURE: The patient was taken to the operating room and placed under general endotracheal anesthesia. A timeout was performed including the patient's name, procedure, and antibiotic administration. He was prepped and draped in the usual sterile fashion. An incision was made 10 cm below the anterior tibial tuberosity. A posterior flap incision was made and electrocautery was utilized to dissect down to the tibia.  This was dissected free and periosteal elevator was utilized to expose the proximal tibia.  Reciprocating bone saw was utilized to divide the bone and the fibula was additionally exposed and divided more proximal to the tibia with a reciprocating bone saw. The greater saphenous vein was then divided using 2-0 silk ties and the anterior, posterior, and peroneal arteries were divided using 0 silk suture. The remaining veins were also divided using 2-0 silk ties. The remaining musculature was divided with an amputation knife and the specimen was sent for permanent pathology. The wound was inspected and found to be hemostatic. The posterior musculature was then debulked such that the posterior flap could be closed. The fascia was reapproximated with interrupted 2-0 Vicryl sutures. The skin was then closed with interrupted 2-0 nylon in an alternating mattress and interrupted fashion. Vaseline gauze was applied to the incision along with fluffs, Kerlix roll, and Spandage. The patient was extubated in the operating room and transported to recovery in stable condition.       COMPA Wong/tutu  DD: 03/06/2017 05:34:22  DT: 03/06/2017 10:14:12  Voice Rec. ID #[id number]  Voice Original ID #[id number]  Doc ID #[id number]  Rev. #0  cc:

## 2017-03-06 NOTE — PROGRESS NOTES
[facility] Progress Note    2/28/2017      Antibiotics:  IV Anti-Infectives     Ordered     Dose/Rate Route Frequency Start Stop    03/03/17 1055  DAPTOmycin (CUBICIN) 700 mg in sodium chloride 0.9 % 50 mL IVPB     Ordering Provider:  Usman Dong MD    700 mg  100 mL/hr over 30 Minutes Intravenous Every 24 Hours 03/03/17 1200      03/01/17 0843  piperacillin-tazobactam (ZOSYN) 4.5 g/100 mL 0.9% NS IVPB (mbp)     Ordering Provider:  Usman Dong MD    4.5 g Intravenous Every 6 Hours 03/01/17 1000      03/01/17 0848  vancomycin 2000 mg/500 mL 0.9% NS IVPB ([facility])     Ordering Provider:  Juancho Kathleen RPH    2,000 mg  over 120 Minutes Intravenous Once 03/01/17 0930 03/01/17 1115    02/28/17 1753  DAPTOmycin (CUBICIN) 450 mg in sodium chloride 0.9 % 50 mL IVPB     Benito Allison [facility] reviewed the order on 02/28/17 1936.   Ordering Provider:  Usman Dong MD    450 mg  100 mL/hr over 30 Minutes Intravenous Every 24 Hours 02/28/17 1800 02/28/17 2040    02/28/17 1753  cefTRIAXone (ROCEPHIN) IVPB 1 g     Ordering Provider:  Carlos Brothers MD    1 g  over 30 Minutes Intravenous Once 02/28/17 1755 02/28/17 1842          CC:sepsis, bacteremia, foot osteo, noncompliance    HPI:  Patient is a 46 y.o. Yr old male with history of uncontrolled diabetes with extensive comorbidity as previously outlined. He has lower extremity vascular disease but no specific option for revascularization per discussion with Dr. Martinez. He was admitted January 2017 with left foot infection, MRI not confirming osteomyelitis, but had surgery on January 30 with metatarsals 3/4/5 amputated, marginal perfusion per operative note and MRSA/GB strep/coag-negative staph in his various cultures. He was transferred to [facility] on broad-spectrum IV antibiotics. While at [facility], he was under the care of Dr. Herrera and, per family/patient, he was told that he required hyperbaric oxygen therapy and had very little chance for long-term  "healing at the foot. He was readmitted on February 13 to [facility] with patient initially interested in having higher level amputation. After discussion with Dr. Martinez, he changed his mind and wanted attempt at salvage again with IV antibiotics/wound care. He was discharged February 17 as outlined in our prior inpatient notes. He was noncompliant with follow-up in the office the week of February 20 despite our calls to the patient. He then stopped antibiotics on his own Wednesday, February 22 and did not notify us. His family was able to get him to come into the office on February 27 at which point he refused IV antibiotics/insisted PICC line be removed and he refused hospitalization/VAC,  understanding the risks of his behavior/decisions as outlined in my office note. He was agreeable for prescription of oral agents and went home to consider his options. On February 28, he called and said he was willing to come to the hospital. He presented to the emergency room, found to have fever/leukocytosis consistent with sepsis and subsequently found to be bacteremic with MRSA.      3/6/17 no fever, no new focal pain and generally stable respiratory status with stable hemodynamics.  Denies any new extremity pain.  No chest pain or palpitation.  No headache photophobia or neck stiffness.  No shortness of breath or cough    ROS:      3/6/17No n/v/d. No rash. No new ADR to Abx.   no focal weakness or description of myalgia.  Otherwise complete review of systems unable to obtain    All Other systems negative for acute complaints on a 12 system review of systems          PE:   Visit Vitals   • /89 (BP Location: Right arm, Patient Position: Lying)   • Pulse 96   • Temp 98 °F (36.7 °C) (Oral)   • Resp 18   • Ht 75\" (190.5 cm)   • Wt 263 lb 1.6 oz (119 kg)   • SpO2 97%   • BMI 32.89 kg/m2       GENERAL: Sleepy, in no acute distress.   HEENT: Normocephalic, atraumatic.  PERRL. EOMI. No conjunctival injection. No icterus. " Oropharynx clear without evidence of thrush or exudate. No evidence of peridontal disease.    NECK: Supple without nuchal rigidity. No mass.  LYMPH: No cervical, axillary or inguinal lymphadenopathy.  HEART: RRR; No murmur, rubs, gallops.   LUNGS: Clear to auscultation bilaterally without wheezing, rales, rhonchi. Normal respiratory effort. Nonlabored. No dullness.  ABDOMEN: Soft, nontender, nondistended. Positive bowel sounds. No rebound or guarding. NO mass or HSM.  EXT:  No cyanosis, clubbing or edema. No cord.  : Genitalia generally unremarkable.  Without Walker catheter.  MSK: FROM without joint effusions noted arms/legs.    SKIN: Warm and dry without cutaneous eruptions on Inspection/palpation aside from below.    NEURO: Oriented to PPT. No focal deficits on motor/sensory exam at arms/legs.    Left BKA noted; no new purulence or redness     No peripheral stigmata/phenomena of endocarditis    Laboratory Data      Results from last 7 days  Lab Units 03/06/17  0653 03/05/17  0715 03/04/17  0558   WBC 10*3/mm3 11.57* 10.18 10.09   HEMOGLOBIN g/dL 9.2* 9.6* 9.6*   HEMATOCRIT % 29.1* 29.5* 29.5*   PLATELETS 10*3/mm3 258 252 252       Results from last 7 days  Lab Units 03/06/17  0653   SODIUM mmol/L 137   POTASSIUM mmol/L 3.8   CHLORIDE mmol/L 105   TOTAL CO2 mmol/L 28.0   BUN mg/dL 13   CREATININE mg/dL 0.50*   GLUCOSE mg/dL 155*   CALCIUM mg/dL 7.7*       Results from last 7 days  Lab Units 03/05/17  0715   ALK PHOS U/L 256*   BILIRUBIN mg/dL 0.3   ALT (SGPT) U/L 42*   AST (SGOT) U/L 50*       Results from last 7 days  Lab Units 02/28/17  1553   SED RATE mm/hr 127*       Results from last 7 days  Lab Units 02/28/17  1553   CRP mg/dL 135.10*       Estimated Creatinine Clearance: 251.2 mL/min (by C-G formula based on Cr of 0.5).      Microbiology:      Radiology:  Imaging Results (last 72 hours)     Procedure Component Value Units Date/Time    XR Foot 3+ View Left [[id number]] Collected:  03/01/17 1036     Updated:   03/01/17 1057    Narrative:       EXAMINATION: XR FOOT 3+ VW LEFT-      INDICATION: Osteomyelitis.      COMPARISON: Compared to MR datasets 01/25/2017.     FINDINGS:   1. The third, fourth and fifth digits of the left foot have been  resected from the metatarsals distally.  2. There is rarefaction and osteoporosis with cortical mineral loss of  the head of the second metatarsal, however, active focal erosion is not  identified.  3. There is degenerative disease in the midfoot structures without  erosion. Hindfoot structures are unremarkable and the left lower leg and  ankle are unremarkable.           Impression:       1. Status post amputation third, fourth and fifth digits or raise of the  left foot involving the third, fourth and fifth metatarsals and toes.  2. Active bone erosion, fracture or destructive process is not  identified from a conventional image standpoint currently.     D:  02/28/2017  E:  03/01/2017     This report was finalized on 3/1/2017 10:55 AM by Dr. Shane Villa MD.               Impression:   --MRSA bacteremia/sepsis. Likely associated with his left foot osteomyelitis/infection, with general progression of illness likely as a direct result of his medical noncompliance and recent refusal of care. He and his family understand the implication of his recent behavior. Dapto EYAD = 1 and vanco eyad=2 per micro;  Changed to dapto.  Repeat cultures negative so far;  No new suppurative sequelae     --Left foot gangrene/osteomyelitis with progression as above. BKA done     --Medical noncompliance as above     --Diabetes type 2. You need to tightly control blood sugar to give best chance for healing     --Peripheral vascular disease as previously outlined     --Heart murmur by exam associated with the MRSA bacteremia. SHMUEL neg for vegetation per Dr Martinez.  TTE no vegetation described by cardio    --Abnormal CPK.  Trended down.  Possibly related to surgery. Monitor.  Asymptomatic per nursing with no  description of myalgia/focal ext weakness    PLAN:    --IV daptomycin/Zosyn     --I discussed potential risks and benefits of the prescribed antibiotics that include, but are not limited to, solid organ toxicity, neuro/bala/vestibular toxicity, renal toxicity, CDiff, cytopenias, hypersensitivity, pulm/muscle tox etc.. Patient/Family voice understanding and agree to proceed.     --Monitor IV and IV antibiotics with risk for systemic complication and potential drug interaction     --Check/review labs cultures and scans     --Highly complex set of issues with high risk for further serious morbidity and other serious sequela     --Discussed with patient's family, Dr. Martinez, and microbiology.     --d/w Dr Martinez; Dr. Martinez reports to me directly SHMUEL neg for vegetation    --surgery 3/2    --d/w Dr John Dong MD  3/6/2017

## 2017-03-06 NOTE — PROGRESS NOTES
Acute Care - Occupational Therapy Treatment Note  [facility]     Patient Name: Kendrick Crystal  : 1970  MRN: [id number]  Today's Date: 3/6/2017  Onset of Illness/Injury or Date of Surgery Date: 17 (L BKA)  Date of Referral to OT: 17  Referring Physician: CANDY Stubbs      Admit Date: 2017    Visit Dx:     ICD-10-CM ICD-9-CM   1. Diabetic foot infection E11.69 250.80    L08.9 686.9   2. SIRS (systemic inflammatory response syndrome) R65.10 995.90   3. Osteomyelitis of left foot, unspecified chronicity M86.9 730.27   4. Impaired mobility and ADLs Z74.09 799.89   5. Impaired functional mobility, balance, gait, and endurance Z74.09 V49.89     Patient Active Problem List   Diagnosis   • DUNLAP Cirrhosis   • Essential hypertension   • Non-compliance   • Type 2 diabetes mellitus with hyperosmolarity without nonketotic hyperglycemic-hyperosmolar coma (nkhhc)   • Arthritis   • Hyperlipemia   • Hypertriglyceridemia, essential   • Diabetic foot ulcer associated with diabetes mellitus due to underlying condition   • Cellulitis of left foot   • Poorly controlled type 2 diabetes mellitus with peripheral neuropathy   • DUNLAP (nonalcoholic steatohepatitis)   • Hyponatremia   • Neutrophilic leukocytosis   • Hypomagnesemia   • Encephalopathy, hepatic   • Hypertension   • Osteomyelitis of left foot   • Sepsis   • Diabetic foot infection   • Diabetic neuropathy             Adult Rehabilitation Note       17 1052          Rehab Assessment/Intervention    Discipline (P)  occupational therapist  -HK      Document Type (P)  therapy note (daily note)  -HK      Subjective Information (P)  no complaints;agree to therapy  -HK      Patient Effort, Rehab Treatment (P)  good  -HK      Symptoms Noted During/After Treatment (P)  none  -HK      Precautions/Limitations (P)  fall precautions   New L BKA   -HK      Equipment Issued to Patient (P)  dressing equipment   LH shoe horn, LH sponge, reacher, sock aid, elastic shoe  lac  -HK      Recorded by [HK] Debbie Maher, OT Student      Pain Assessment    Pain Assessment (P)  [place]-Baker FACES  -HK      [place]-Tavares FACES Pain Rating (P)  2  -HK      Pain Type (P)  Surgical pain   P when moving L leg  -HK      Recorded by [HK] Debbie Maher, OT Student      Cognitive Assessment/Intervention    Current Cognitive/Communication Assessment (P)  functional  -HK      Orientation Status (P)  oriented x 4  -HK      Follows Commands/Answers Questions (P)  100% of the time;able to follow single-step instructions  -HK      Personal Safety (P)  mild impairment  -HK      Personal Safety Interventions (P)  fall prevention program maintained;gait belt;nonskid shoes/slippers when out of bed  -HK      Recorded by [HK] Debbie Maher, OT Student      Bed Mobility, Assessment/Treatment    Bed Mobility, Comment (P)  Pt up in chair on arrival  -HK      Recorded by [HK] Debbie Maher, OT Student      Transfer Assessment/Treatment    Transfer, Comment (P)  not assessed - Pt fatigued from physical therapy  -HK      Recorded by [HK] Debbie Maher, OT Student      Functional Mobility    Functional Mobility- Comment (P)  unable to assess  -HK      Recorded by [] Debbie Maher, OT Student      Lower Body Bathing Assessment/Training    LB Bathing Assess/Train Assistive Device (P)  long-handled sponge  -HK      LB Bathing Assess/Train, Position (P)  supported sitting  -HK      LB Bathing Assess/Train, [place] Level (P)  supervision required;verbal cues required  -HK      LB Bathing Assess/Train, Impairments (P)  strength decreased;decreased flexibility;pain  -HK      LB Bathing Assess/Train, Comment (P)  Issued LH sponge and educated Pt on use. Pt simulated use of sponge for bathing.   -HK      Recorded by [HK] Debbie Maher, OT Student      Lower Body Dressing Assessment/Training    LB Dressing Assess/Train, Clothing Type (P)  doffing:;donning:;slipper socks  -HK      LB Dressing Assess/Train, Assist Device (P)   long-handled shoe horn;reacher;sock-aid  -HK      LB Dressing Assess/Train, Position (P)  supported sitting  -HK      LB Dressing Assess/Train, [place] (P)  supervision required;verbal cues required  -HK      LB Dressing Assess/Train, Impairments (P)  strength decreased;impaired balance;decreased flexibility;pain  -HK      LB Dressing Assess/Train, Comment (P)  Pt issued and educated on LH shoe horn, sock aid, reacher, and elastic shoe laces. Pt donned sock x2 using sock aid.   -HK      Recorded by [HK] Debbie Maher OT Student      Positioning and Restraints    Pre-Treatment Position (P)  sitting in chair/recliner  -HK      Post Treatment Position (P)  chair  -HK      In Chair (P)  sitting;reclined;call light within reach;encouraged to call for assist  -HK      Recorded by [HK] Debbie Maher OT Student        User Key  (r) = Recorded By, (t) = Taken By, (c) = Cosigned By    Initials Name Effective Dates    HK Debbie Maher, OT Student 12/19/16 -                 OT Goals       03/06/17 1119 03/04/17 1159       Bed Mobility OT LTG    Bed Mobility OT LTG, Date Established  03/04/17  -AN     Bed Mobility OT LTG, Time to Achieve  1 wk  -AN     Bed Mobility OT LTG, Activity Type  supine to sit/sit to supine  -AN     Bed Mobility OT LTG, [place] Level  minimum assist (75% patient effort)  -AN     Bed Mobility OT LTG, Outcome (P)  goal ongoing  -HK      Transfer Training OT LTG    Transfer Training OT LTG, Date Established  03/04/17  -AN     Transfer Training OT LTG, Time to Achieve  1 wk  -AN     Transfer Training OT LTG, Activity Type  bed to chair /chair to bed  -AN     Transfer Training OT LTG, [place] Level  minimum assist (75% patient effort);2 person assist required  -AN     Transfer Training OT LTG, Assist Device  walker, rolling  -AN     Transfer Training OT LTG, Outcome (P)  goal ongoing  -HK      Bathing OT LTG    Bathing Goal OT LTG, Date Established  03/04/17  -AN     Bathing Goal OT LTG, Time to  Achieve  1 wk  -AN     Bathing Goal OT LTG, Activity Type  simulates;lower body bathing  -AN     Bathing Goal OT LTG, [place] Level  minimum assist (75% patient effort)  -AN     Bathing Goal OT LTG, Assist Device  sponge, long handled  -AN     Bathing Goal OT LTG, Outcome (P)  goal met  -HK      LB Dressing OT LTG    LB Dressing Goal OT LTG, Date Established  03/04/17  -AN     LB Dressing Goal OT LTG, Time to Achieve  1 wk  -AN     LB Dressing Goal OT LTG, [place] Level  minimum assist (75% patient effort)  -AN     LB Dressing Goal OT LTG, Adaptive Equipment  reacher;shoe horn, long handled;sock-aid  -AN     LB Dressing Goal OT LTG, Outcome (P)  goal ongoing   Issued and educated on AE  -HK        User Key  (r) = Recorded By, (t) = Taken By, (c) = Cosigned By    Initials Name Provider Type    MARIA DOLORES Anguiano, OT Occupational Therapist     Debbie Maher, OT Student OT Student          Occupational Therapy Education     Title: PT OT SLP Therapies (Active)     Topic: Occupational Therapy (Done)     Point: ADL training (Done)    Description: Instruct learner(s) on proper safety adaptation and remediation techniques during self care or transfers.   Instruct in proper use of assistive devices.    Learning Progress Summary    Learner Readiness Method Response Comment Documented by Status   Patient Acceptance E VU Pt educated on use of AE (LH sponge, LH shoe horn, reacher, and sock aid). Pt educated on safety precautions.  03/06/17 1118 Done    Acceptance E,D RODOLFO,MADDIE,NR Educated pt and family on assist needs for ADL's and txfrs. Educated on benefits of daily UE ex and ADL participation.  03/04/17 1157 Done   Family Acceptance E,D RODOLFO,MADDIE,NR Educated pt and family on assist needs for ADL's and txfrs. Educated on benefits of daily UE ex and ADL participation.  03/04/17 1157 Done               Point: Home exercise program (Done)    Description: Instruct learner(s) on appropriate technique for monitoring,  assisting and/or progressing therapeutic exercises/activities.    Learning Progress Summary    Learner Readiness Method Response Comment Documented by Status   Patient Acceptance E,D RODOLFO,MADDIE,NR Educated pt and family on assist needs for ADL's and txfrs. Educated on benefits of daily UE ex and ADL participation. AN 03/04/17 1157 Done   Family Acceptance CECILY COOK,MADDIE,NR Educated pt and family on assist needs for ADL's and txfrs. Educated on benefits of daily UE ex and ADL participation. AN 03/04/17 1157 Done               Point: Precautions (Done)    Description: Instruct learner(s) on prescribed precautions during self-care and functional transfers.    Learning Progress Summary    Learner Readiness Method Response Comment Documented by Status   Patient Acceptance E VU Pt educated on use of AE (LH sponge, LH shoe horn, reacher, and sock aid). Pt educated on safety precautions.  03/06/17 1118 Done    Acceptance CECILY COOK,MADDIE,NR Educated pt and family on assist needs for ADL's and txfrs. Educated on benefits of daily UE ex and ADL participation. AN 03/04/17 1157 Done   Family Acceptance CECILY COOK,MADDIE,NR Educated pt and family on assist needs for ADL's and txfrs. Educated on benefits of daily UE ex and ADL participation. AN 03/04/17 1157 Done                      User Key     Initials Effective Dates Name Provider Type Discipline     06/22/15 -  Fela Anguiano OT Occupational Therapist OT     12/19/16 -  Debbie Maher OT Student OT Student OT                  OT Recommendation and Plan  Anticipated Equipment Needs At Discharge: bathing equipment, dressing equipment, reacher  Anticipated Discharge Disposition: inpatient rehabilitation facility  Therapy Frequency: daily  Plan of Care Review  Plan Of Care Reviewed With: (P) patient  Progress: (P) improving  Outcome Summary/Follow up Plan: (P) Pt issued and educated on use of shoe horn, LH sponge, reacher, sock aid, and elastic shoe laces. Recommend discharge to [facility] with further  skilled occupational therapy services.         Outcome Measures       03/06/17 1052 03/04/17 1015 03/04/17 1011    How much help from another person do you currently need...    Turning from your back to your side while in flat bed without using bedrails?  2  -LS     Moving from lying on back to sitting on the side of a flat bed without bedrails?  2  -LS     Moving to and from a bed to a chair (including a wheelchair)?  2  -LS     Standing up from a chair using your arms (e.g., wheelchair, bedside chair)?  2  -LS     Climbing 3-5 steps with a railing?  1  -LS     To walk in hospital room?  1  -LS     AM-PAC 6 Clicks Score  10  -LS     How much help from another is currently needed...    Putting on and taking off regular lower body clothing? (P)  3  -HK  2  -AN    Bathing (including washing, rinsing, and drying) (P)  3  -HK  2  -AN    Toileting (which includes using toilet bed pan or urinal) (P)  2  -HK  2  -AN    Putting on and taking off regular upper body clothing (P)  2  -HK  2  -AN    Taking care of personal grooming (such as brushing teeth) (P)  3  -HK  3  -AN    Eating meals (P)  4  -HK  4  -AN    Score (P)  17  -HK  15  -AN    Functional Assessment    Outcome Measure Options (P)  AM-PAC 6 Clicks Daily Activity (OT)  -HK AM-PAC 6 Clicks Basic Mobility (PT)  -LS AM-PAC 6 Clicks Daily Activity (OT)  -AN      User Key  (r) = Recorded By, (t) = Taken By, (c) = Cosigned By    Initials Name Provider Type    AN Fela Anguiano, OT Occupational Therapist     Ailyn Marcus, PT Physical Therapist     Debbie Maher, OT Student OT Student           Time Calculation:         Time Calculation- OT       03/06/17 1124          Time Calculation- OT    OT Start Time (P)  1052  -      Total Timed Code Minutes- OT (P)  14 minute(s)  -HK      OT Received On (P)  03/06/17  -      OT Goal Re-Cert Due Date (P)  03/14/17  -        User Key  (r) = Recorded By, (t) = Taken By, (c) = Cosigned By    Initials Name Provider Type      Debbie Maher, OT Student OT Student           Therapy Charges for Today     Code Description Service Date Service Provider Modifiers Qty    [id number]  OT THERAPEUTIC ACT EA 15 MIN 3/6/2017 Debbie Maher, OT Student GO 1               Debbie Maher OT Student  3/6/2017

## 2017-03-06 NOTE — PROGRESS NOTES
Cardiothoracic Surgery Progress Note      POD # 4  s/p L BKA       LOS: 6 days      Subjective:  Patient reports controlled pain and improved mood.  No complaints.    Objective:  Vital Signs  Temp:  [97.9 °F (36.6 °C)-98.2 °F (36.8 °C)] 98 °F (36.7 °C)  Heart Rate:  [96-99] 96  Resp:  [18-20] 18  BP: (128-155)/(73-99) 152/89    Physical Exam:   General Appearance: alert, appears stated age and cooperative   Lungs: clear to auscultation, respirations regular, respirations even and respirations unlabored   Heart: regular rhythm & normal rate, normal S1, S2, no murmur, no clau, no rub and no click   Skin: Incision c/d/i, mild ecchymoses near incision.       Results:    Results from last 7 days  Lab Units 03/06/17  0653   WBC 10*3/mm3 11.57*   HEMOGLOBIN g/dL 9.2*   HEMATOCRIT % 29.1*   PLATELETS 10*3/mm3 258       Results from last 7 days  Lab Units 03/06/17  0653   SODIUM mmol/L 137   POTASSIUM mmol/L 3.8   CHLORIDE mmol/L 105   TOTAL CO2 mmol/L 28.0   BUN mg/dL 13   CREATININE mg/dL 0.50*   GLUCOSE mg/dL 155*   CALCIUM mg/dL 7.7*         Assessment:  POD #4 s/p L BKA, satisfactory recovery.    Plan:  ABX per ID  PT/OT  Awaiting rehab placement

## 2017-03-06 NOTE — PLAN OF CARE
Problem: Patient Care Overview (Adult)  Goal: Plan of Care Review  Outcome: Ongoing (interventions implemented as appropriate)    03/06/17 0342   Coping/Psychosocial Response Interventions   Plan Of Care Reviewed With patient   Patient Care Overview   Progress progress toward functional goals as expected         Problem: Infection, Risk/Actual (Adult)  Goal: Identify Related Risk Factors and Signs and Symptoms  Outcome: Ongoing (interventions implemented as appropriate)    03/06/17 0342   Infection, Risk/Actual   Infection, Risk/Actual: Related Risk Factors chronic illness/condition;sleep disturbance;surgery/procedure;tissue perfusion altered   Signs and Symptoms (Infection, Risk/Actual) blood glucose changes;edema;malaise;pain;cultures positive       Goal: Infection Prevention/Resolution  Outcome: Ongoing (interventions implemented as appropriate)    03/06/17 0342   Infection, Risk/Actual (Adult)   Infection Prevention/Resolution making progress toward outcome

## 2017-03-06 NOTE — PROGRESS NOTES
Acute Care - Physical Therapy Treatment Note  [facility]     Patient Name: Kendrick Crystal  : 1970  MRN: [id number]  Today's Date: 3/6/2017  Onset of Illness/Injury or Date of Surgery Date: 17 (L BKA)  Date of Referral to PT: 17  Referring Physician: Enoc, CANDY    Admit Date: 2017    Visit Dx:    ICD-10-CM ICD-9-CM   1. Diabetic foot infection E11.69 250.80    L08.9 686.9   2. SIRS (systemic inflammatory response syndrome) R65.10 995.90   3. Osteomyelitis of left foot, unspecified chronicity M86.9 730.27   4. Impaired mobility and ADLs Z74.09 799.89   5. Impaired functional mobility, balance, gait, and endurance Z74.09 V49.89     Patient Active Problem List   Diagnosis   • DUNLAP Cirrhosis   • Essential hypertension   • Non-compliance   • Type 2 diabetes mellitus with hyperosmolarity without nonketotic hyperglycemic-hyperosmolar coma (nkhhc)   • Arthritis   • Hyperlipemia   • Hypertriglyceridemia, essential   • Diabetic foot ulcer associated with diabetes mellitus due to underlying condition   • Cellulitis of left foot   • Poorly controlled type 2 diabetes mellitus with peripheral neuropathy   • DUNLAP (nonalcoholic steatohepatitis)   • Hyponatremia   • Neutrophilic leukocytosis   • Hypomagnesemia   • Encephalopathy, hepatic   • Hypertension   • Osteomyelitis of left foot   • Sepsis   • Diabetic foot infection   • Diabetic neuropathy               Adult Rehabilitation Note       17 1052 17 1030       Rehab Assessment/Intervention    Discipline (P)  occupational therapist  -HK physical therapy assistant  -UD     Document Type (P)  therapy note (daily note)  -HK therapy note (daily note)  -UD     Subjective Information (P)  no complaints;agree to therapy  -HK agree to therapy;complains of;weakness;pain  -UD     Patient Effort, Rehab Treatment (P)  good  -HK good  -UD     Symptoms Noted During/After Treatment (P)  none  -HK fatigue  -UD     Precautions/Limitations (P)  fall precautions    New L BKA   -HK fall precautions  -UD     Equipment Issued to Patient (P)  dressing equipment   LH shoe horn, LH sponge, reacher, sock aid, elastic shoe lac  -HK      Recorded by [HK] Debbie Maher, OT Student [UD] Hali Valadez PTA     Vital Signs    O2 Delivery Post Treatment  supplemental O2  -UD     Pre Patient Position  Supine  -UD     Intra Patient Position  Standing  -UD     Post Patient Position  Sitting  -UD     Recorded by  [UD] Hali Valadez PTA     Pain Assessment    Pain Assessment (P)  [place]-Baker FACES  -HK [place]-Baker FACES  -UD     [place]-Tavares FACES Pain Rating (P)  2  -HK 2  -UD     Pain Score  2  -UD     Post Pain Score  4  -UD     Pain Type (P)  Surgical pain   P when moving L leg  -HK Surgical pain  -UD     Pain Location  Leg  -UD     Pain Orientation  Left  -UD     Pain Intervention(s)  Repositioned  -UD     Recorded by [HK] Debbie Maher, OT Student [UD] Hali Valadez PTA     Cognitive Assessment/Intervention    Current Cognitive/Communication Assessment (P)  functional  -HK      Orientation Status (P)  oriented x 4  -HK oriented x 4  -UD     Follows Commands/Answers Questions (P)  100% of the time;able to follow single-step instructions  -% of the time  -UD     Personal Safety (P)  mild impairment  -HK      Personal Safety Interventions (P)  fall prevention program maintained;gait belt;nonskid shoes/slippers when out of bed  -HK      Recorded by [HK] Debbie Maher, OT Student [UD] Hali Valadez PTA     Bed Mobility, Assessment/Treatment    Bed Mob, Supine to Sit, [place]  moderate assist (50% patient effort);verbal cues required  -UD     Bed Mobility, Comment (P)  Pt up in chair on arrival  -HK      Recorded by [HK] Debbie Maher, OT Student [UD] Hali Valadez PTA     Transfer Assessment/Treatment    Transfers, Bed-Chair [place]  moderate assist (50% patient effort);2 person assist required   pivot transfer  -UD     Transfers, Sit-Stand [place]  moderate assist (50% patient  effort);2 person assist required  -UD     Transfers, Stand-Sit [place]  moderate assist (50% patient effort);2 person assist required  -UD     Transfers, Sit-Stand-Sit, Assist Device  rolling walker  -UD     Transfer, Comment (P)  not assessed - Pt fatigued from physical therapy  -HK      Recorded by [HK] Debbie Maher, OT Student [UD] Hali Valadez, PTA     Gait Assessment/Treatment    Gait, [place] Level  unable to perform  -UD     Recorded by  [UD] Hali Valadez, PTA     Functional Mobility    Functional Mobility- Comment (P)  unable to assess  -HK      Recorded by [HK] Debbie Maher, OT Student      Lower Body Bathing Assessment/Training    LB Bathing Assess/Train Assistive Device (P)  long-handled sponge  -HK      LB Bathing Assess/Train, Position (P)  supported sitting  -HK      LB Bathing Assess/Train, [place] Level (P)  supervision required;verbal cues required  -      LB Bathing Assess/Train, Impairments (P)  strength decreased;decreased flexibility;pain  -HK      LB Bathing Assess/Train, Comment (P)  Issued  sponge and educated Pt on use. Pt simulated use of sponge for bathing.   -HK      Recorded by [HK] Debbie Maher, OT Student      Lower Body Dressing Assessment/Training    LB Dressing Assess/Train, Clothing Type (P)  doffing:;donning:;slipper socks  -      LB Dressing Assess/Train, Assist Device (P)  long-handled shoe horn;reacher;sock-aid  -      LB Dressing Assess/Train, Position (P)  supported sitting  -HK      LB Dressing Assess/Train, [place] (P)  supervision required;verbal cues required  -      LB Dressing Assess/Train, Impairments (P)  strength decreased;impaired balance;decreased flexibility;pain  -HK      LB Dressing Assess/Train, Comment (P)  Pt issued and educated on LH shoe horn, sock aid, reacher, and elastic shoe laces. Pt donned sock x2 using sock aid.   -HK      Recorded by [HK] Debbie Maher, OT Student      Balance Skills Training    Sitting-Level of  Assistance  Close supervision  -UD     Static Standing Balance Support  assistive device  -UD     Recorded by  [UD] Hali Valadez PTA     Therapy Exercises    Bilateral Lower Extremities  AROM:;10 reps;sitting  -UD     Recorded by  [UD] Hali Vlaadez PTA     Positioning and Restraints    Pre-Treatment Position (P)  sitting in chair/recliner  -HK in bed  -UD     Post Treatment Position (P)  chair  -HK chair  -UD     In Chair (P)  sitting;reclined;call light within reach;encouraged to call for assist  -HK notified nsg;reclined;sitting;call light within reach;exit alarm on;with family/caregiver;legs elevated  -UD     Recorded by [HK] Debbie Maher, OT Student [UD] Hali Valadez PTA       User Key  (r) = Recorded By, (t) = Taken By, (c) = Cosigned By    Initials Name Effective Dates    UD Hali Valadez PTA 06/22/15 -     HK Debbie Maher, OT Student 12/19/16 -                 IP PT Goals       03/06/17 1126 03/04/17 1249       Bed Mobility PT LTG    Bed Mobility PT LTG, Date Established  03/04/17  -LS     Bed Mobility PT LTG, Time to Achieve  2 wks  -LS     Bed Mobility PT LTG, Activity Type  supine to sit/sit to supine  -LS     Bed Mobility PT LTG, [place] Level  minimum assist (75% patient effort)  -LS     Bed Mobility PT LTG, Outcome goal ongoing  -UD goal ongoing  -LS     Transfer Training PT LTG    Transfer Training PT LTG, Date Established  03/04/17  -LS     Transfer Training PT LTG, Time to Achieve  2 wks  -LS     Transfer Training PT LTG, Activity Type  sit to stand/stand to sit  -LS     Transfer Training PT LTG, [place] Level  minimum assist (75% patient effort)  -LS     Transfer Training PT LTG, Outcome goal ongoing  -UD goal ongoing  -LS     Gait Training PT LTG    Gait Training Goal PT LTG, Date Established  03/04/17  -LS     Gait Training Goal PT LTG, Time to Achieve  2 wks  -LS     Gait Training Goal PT LTG, [place] Level  minimum assist (75% patient effort)  -LS     Gait Training Goal PT LTG,  Assist Device  walker, rolling  -LS     Gait Training Goal PT LTG, Distance to Achieve  50  -LS     Gait Training Goal PT LTG, Outcome goal ongoing  -UD goal ongoing  -LS       User Key  (r) = Recorded By, (t) = Taken By, (c) = Cosigned By    Initials Name Provider Type    UD Hali Valadez PTA Physical Therapy Assistant    EKTA Marcus, PT Physical Therapist          Physical Therapy Education     Title: PT OT SLP Therapies (Done)     Topic: Physical Therapy (Done)     Point: Mobility training (Done)    Learning Progress Summary    Learner Readiness Method Response Comment Documented by Status   Patient Acceptance E,D VU,NR   03/06/17 1125 Done   Family Acceptance E,D VU,NR   03/06/17 1125 Done               Point: Home exercise program (Done)    Learning Progress Summary    Learner Readiness Method Response Comment Documented by Status   Patient Acceptance E,D VU,NR   03/06/17 1125 Done   Family Acceptance E,D VU,NR   03/06/17 1125 Done               Point: Body mechanics (Done)    Learning Progress Summary    Learner Readiness Method Response Comment Documented by Status   Patient Acceptance E,D VU,NR   03/06/17 1125 Done   Family Acceptance E,D VU,NR   03/06/17 1125 Done               Point: Precautions (Done)    Learning Progress Summary    Learner Readiness Method Response Comment Documented by Status   Patient Acceptance E,D VU,NR   03/06/17 1125 Done    Acceptance E VU,NR educated pt to maintain L knee extension while lying in bed. also instructed pt to work on aps, QS, & GS X 10 reps 3X/day.  03/04/17 1246 Done   Family Acceptance ECECILY,NR   03/06/17 1125 Done                      User Key     Initials Effective Dates Name Provider Type Discipline     06/22/15 -  Hali Valadez PTA Physical Therapy Assistant PT     06/19/15 -  Ailyn Marcus, PT Physical Therapist PT                    PT Recommendation and Plan  Anticipated Discharge Disposition: inpatient rehabilitation  facility  Planned Therapy Interventions: balance training, bed mobility training, gait training, home exercise program, patient/family education, strengthening, transfer training  PT Frequency: daily, per priority policy  Plan of Care Review  Plan Of Care Reviewed With: patient  Progress: improving  Outcome Summary/Follow up Plan: pt still has lots of pain,did stand several times with walker and pivot to chair.reviewed stump exercises          Outcome Measures       03/06/17 1052 03/06/17 1030 03/04/17 1015    How much help from another person do you currently need...    Turning from your back to your side while in flat bed without using bedrails?  3  -UD 2  -LS    Moving from lying on back to sitting on the side of a flat bed without bedrails?  2  -UD 2  -LS    Moving to and from a bed to a chair (including a wheelchair)?  2  -UD 2  -LS    Standing up from a chair using your arms (e.g., wheelchair, bedside chair)?  2  -UD 2  -LS    Climbing 3-5 steps with a railing?  1  -UD 1  -LS    To walk in hospital room?  1  -UD 1  -LS    AM-PAC 6 Clicks Score  11  -UD 10  -LS    How much help from another is currently needed...    Putting on and taking off regular lower body clothing? (P)  3  -HK      Bathing (including washing, rinsing, and drying) (P)  3  -HK      Toileting (which includes using toilet bed pan or urinal) (P)  2  -HK      Putting on and taking off regular upper body clothing (P)  2  -HK      Taking care of personal grooming (such as brushing teeth) (P)  3  -HK      Eating meals (P)  4  -HK      Score (P)  17  -UD (r) HK (t)      Functional Assessment    Outcome Measure Options (P)  AM-PAC 6 Clicks Daily Activity (OT)  -HK  AM-PAC 6 Clicks Basic Mobility (PT)  -LS      03/04/17 1011          How much help from another is currently needed...    Putting on and taking off regular lower body clothing? 2  -AN      Bathing (including washing, rinsing, and drying) 2  -AN      Toileting (which includes using toilet  bed pan or urinal) 2  -AN      Putting on and taking off regular upper body clothing 2  -AN      Taking care of personal grooming (such as brushing teeth) 3  -AN      Eating meals 4  -AN      Score 15  -AN      Functional Assessment    Outcome Measure Options AM-PAC 6 Clicks Daily Activity (OT)  -AN        User Key  (r) = Recorded By, (t) = Taken By, (c) = Cosigned By    Initials Name Provider Type    HUNTER Valadez PTA Physical Therapy Assistant    MARIA DOLORES Anguiano, OT Occupational Therapist    ETKA Marcus, PT Physical Therapist    HK Debbie Maher, OT Student OT Student           Time Calculation:         PT Charges       03/06/17 1128          Time Calculation    PT Received On 03/06/17  -      PT Goal Re-Cert Due Date 03/14/17  -      Time Calculation- PT    Total Timed Code Minutes- PT 20 minute(s)  -UD        User Key  (r) = Recorded By, (t) = Taken By, (c) = Cosigned By    Initials Name Provider Type    HUNTER Valadez PTA Physical Therapy Assistant          Therapy Charges for Today     Code Description Service Date Service Provider Modifiers Qty    48953441383 HC PT THER PROC EA 15 MIN 3/6/2017 Hali Valadez PTA GP 1    51694030726 HC PT THER SUPP EA 15 MIN 3/6/2017 Hali Valadez PTA GP 1          PT G-Codes  Outcome Measure Options: (P) AM-PAC 6 Clicks Daily Activity (OT)    Hali Valadez PTA  3/6/2017

## 2017-03-06 NOTE — PROGRESS NOTES
[facility] Medicine Services    ASSESSMENT / PLAN             Sepsis, secondary to left foot infection, chronic foot ulcers, s/p recent 3rd-5th left metatarsal amputations by Dr. Martinez 1/30/17 , resolving   POD # 3 s/p L BKA  DUNLAP Cirrhosis  Essential hypertension, stable  Non-compliance  Hyperlipemia  Poorly controlled type 2 diabetes mellitus with peripheral neuropathy.   Lab Results   Component Value Date    HGBA1C 9.70 (H) 02/28/2017     SHMUEL negative for IE/vegetations   Osteomyelitis of left foot  Diabetic foot infection  Diabetic neuropathy     Plan--  Plan--  S/p L BKA by  Dr Martinez    Afebrile today and leukocytosis resolved  ,   Continue antibiotics, , and discussed with  patient and wife at the bedside   Echo reviewed Continue Levemir as well as pre-meal insulin. Labile BS  Elevated ESR, CRP,  Awaiting rehab bed     Length of Stay 6 Days   Code- full code  DVT Prophylaxis- Lovenox  Condition--serious  Prognosis-- guarded, poor long-term  Expected Discharge disposition in        1-2 days   Days to rehabilitation     SUBJECTIVE--HPI/ Events overnight / CC- Hospital Follow up visit/ ROS-not detailed ,  as performed below    Getting up with PT. No complaints, Normal BM, no soa, chest pain, pain is controlled.    Gen -no fevers, chills  CV-no chest pain, palpitations  Resp-no cough, dyspnea  GI-no N/V/D, abd pain      OBJECTIVE        Vital Sign Min/Max for last 24 hours  Temp  Min: 97.9 °F (36.6 °C)  Max: 98.2 °F (36.8 °C)   BP  Min: 128/73  Max: 155/99   Pulse  Min: 96  Max: 99   Resp  Min: 18  Max: 20   No Data Recorded   No Data Recorded      Intake/Output Summary (Last 24 hours) at 03/06/17 1140  Last data filed at 03/06/17 0437   Gross per 24 hour   Intake   1150 ml   Output    800 ml   Net    350 ml           Gen/Constitutional-no acute distress  CV-RRR, S1 S2 normal, 2/6 systolic murmur  Resp-CTAB, no wheezes  Abd-soft, NT, ND, +BS  Ext-no edema, left BKA stump under dressing  Neuro-A&Ox3, no focal deficits  Flat mood, not much interested in conversation   Skin, no new rashes over last 24 hours      Medications    Current Facility-Administered Medications:   •  acetaminophen (TYLENOL) tablet 650 mg, 650 mg, Oral, Q4H PRN, Melisa Boles MD, 650 mg at 03/01/17 2119  •  aspirin EC tablet 81 mg, 81 mg, Oral, Daily, Melisa Boles MD, 81 mg at 03/06/17 0859  •  DAPTOmycin (CUBICIN) 700 mg in sodium chloride 0.9 % 50 mL IVPB, 700 mg, Intravenous, Q24H, Usman Dong MD, Last Rate: 100 mL/hr at 03/05/17 1156, 700 mg at 03/05/17 1156  •  dextrose (D50W) solution 25 g, 25 g, Intravenous, Q15 Min PRN, Melisa Boles MD  •  dextrose (GLUTOSE) oral gel 15 g, 15 g, Oral, Q15 Min PRN, Melisa Boles MD  •  docusate sodium (COLACE) capsule 100 mg, 100 mg, Oral, BID, Melisa Boles MD, 100 mg at 03/05/17 0921  •  famotidine (PEPCID) tablet 20 mg, 20 mg, Oral, BID, Melisa Boles MD, 20 mg at 03/06/17 0859  •  gabapentin (NEURONTIN) capsule 1,200 mg, 1,200 mg, Oral, Q8H, Melisa Boles MD, 1,200 mg at 03/06/17 0607  •  glucagon (GLUCAGEN) injection 1 mg, 1 mg, Subcutaneous, Q15 Min PRN, Melisa Boles MD  •  HYDROcodone-acetaminophen (NORCO)  MG per tablet 1 tablet, 1 tablet, Oral, Q6H PRN, Unruly Lopez MD, 1 tablet at 03/06/17 0859  •  insulin detemir (LEVEMIR) injection 30 Units, 30 Units, Subcutaneous, Nightly, Casie M Mayne, PA, 30 Units at 03/05/17 2117  •  insulin lispro (humaLOG) injection 0-9 Units, 0-9 Units, Subcutaneous, 4x Daily With Meals & Nightly, Melisa Boles MD, 2 Units at 03/05/17 2111  •  losartan (COZAAR) tablet 100 mg, 100 mg, Oral, Q24H, Melisa Boles MD, 100 mg at 03/06/17 0859  •  [START ON 3/7/2017] Magnesium Sulfate 4 GM/100ML infusion 4 g, 4 g, Intravenous, PRN, Mavis Li [facility]  •  Magnesium Sulfate 4 GM/100ML infusion 4 g, 4 g, Intravenous, Once, Mavis Li [facility], 4 g at 03/06/17 1125  •  magnesium sulfate in D5W 1g/100mL (PREMIX) IVPB 1 g, 1 g, Intravenous, PRN **OR** magnesium  sulfate 8 g in dextrose (D5W) 5 % 250 mL infusion, 8 g, Intravenous, PRN **OR** magnesium sulfate 6 g in dextrose (D5W) 5 % 250 mL infusion, 6 g, Intravenous, PRN **OR** [DISCONTINUED] magnesium sulfate in D5W 1g/100mL (PREMIX) IVPB 1 g, 1 g, Intravenous, PRN, Unruly Lopez MD  •  Morphine sulfate (PF) injection 1 mg, 1 mg, Intravenous, Q4H PRN, 1 mg at 03/05/17 1815 **AND** naloxone (NARCAN) injection 0.4 mg, 0.4 mg, Intravenous, Q5 Min PRN, Melisa Boles MD  •  ondansetron (ZOFRAN) injection 4 mg, 4 mg, Intravenous, Q6H PRN, Melisa Boles MD, 4 mg at 03/01/17 0944  •  piperacillin-tazobactam (ZOSYN) 4.5 g/100 mL 0.9% NS IVPB (mbp), 4.5 g, Intravenous, Q6H, Usmna Dong MD, 4.5 g at 03/06/17 0900  •  polyethylene glycol (MIRALAX) powder 17 g, 17 g, Oral, Daily, Melisa Boles MD, 17 g at 02/28/17 2008  •  probiotic (CULTURELLE) capsule 1 capsule, 1 capsule, Oral, Daily, Melisa Boles MD, 1 capsule at 03/06/17 0859  •  sodium chloride 0.9 % flush 1-10 mL, 1-10 mL, Intravenous, PRN, Melisa Boles MD  •  sodium chloride 0.9 % flush 10 mL, 10 mL, Intravenous, PRN, Carlos Brothers MD  •  sodium chloride 0.9 % infusion, 100 mL/hr, Intravenous, Continuous, Melisa Boles MD, Last Rate: 100 mL/hr at 03/06/17 0358, 100 mL/hr at 03/06/17 0358  •  zolpidem (AMBIEN) tablet 5 mg, 5 mg, Oral, Nightly PRN, Unruly Lopez MD  I have reviewed the labs, culture data, radiology results, and diagnostic studies.    Results from last 7 days  Lab Units 03/06/17  0653 03/05/17  0715 03/04/17  0726   SODIUM mmol/L 137 136 132   POTASSIUM mmol/L 3.8 3.8 3.8   CHLORIDE mmol/L 105 103 101   TOTAL CO2 mmol/L 28.0 27.0 25.0   BUN mg/dL 13 16 21   CREATININE mg/dL 0.50* 0.50* 0.50*   CALCIUM mg/dL 7.7* 8.0* 7.8*   BILIRUBIN mg/dL  --  0.3 0.3   ALK PHOS U/L  --  256* 175*   ALT (SGPT) U/L  --  42* 27   AST (SGOT) U/L  --  50* 30   GLUCOSE mg/dL 155* 167* 194*       Results from last 7 days  Lab Units 03/06/17  0653 03/05/17  0715 03/04/17  0558    WBC 10*3/mm3 11.57* 10.18 10.09   HEMOGLOBIN g/dL 9.2* 9.6* 9.6*   HEMATOCRIT % 29.1* 29.5* 29.5*   PLATELETS 10*3/mm3 258 252 252           Culture Data:    Radiology Results:  Imaging Results (last 24 hours)     Procedure Component Value Units Date/Time    XR Foot 3+ View Left [[id number]] Collected:  03/01/17 1036     Updated:  03/01/17 1057    Narrative:       EXAMINATION: XR FOOT 3+ VW LEFT-      INDICATION: Osteomyelitis.      COMPARISON: Compared to MR datasets 01/25/2017.     FINDINGS:   1. The third, fourth and fifth digits of the left foot have been  resected from the metatarsals distally.  2. There is rarefaction and osteoporosis with cortical mineral loss of  the head of the second metatarsal, however, active focal erosion is not  identified.  3. There is degenerative disease in the midfoot structures without  erosion. Hindfoot structures are unremarkable and the left lower leg and  ankle are unremarkable.           Impression:       1. Status post amputation third, fourth and fifth digits or raise of the  left foot involving the third, fourth and fifth metatarsals and toes.  2. Active bone erosion, fracture or destructive process is not  identified from a conventional image standpoint currently.     D:  02/28/2017  E:  03/01/2017     This report was finalized on 3/1/2017 10:55 AM by Dr. Shane Villa MD.           *. Please note that portions of this note were completed with a voice recognition program. Efforts were made to edit the dictations, but occasionally words are mistranscribed.  Karrie Bourgeois, APRN03/06/1711:40 AM

## 2017-03-06 NOTE — PLAN OF CARE
Problem: Patient Care Overview (Adult)  Goal: Plan of Care Review  Outcome: Ongoing (interventions implemented as appropriate)    03/06/17 1126   Coping/Psychosocial Response Interventions   Plan Of Care Reviewed With patient   Patient Care Overview   Progress improving   Outcome Evaluation   Outcome Summary/Follow up Plan pt still has lots of pain,did stand several times with walker and pivot to chair.reviewed stump exercises         Problem: Inpatient Physical Therapy  Goal: Bed Mobility Goal LTG- PT  Outcome: Ongoing (interventions implemented as appropriate)    03/04/17 1249 03/06/17 1126   Bed Mobility PT LTG   Bed Mobility PT LTG, Date Established 03/04/17 --    Bed Mobility PT LTG, Time to Achieve 2 wks --    Bed Mobility PT LTG, Activity Type supine to sit/sit to supine --    Bed Mobility PT LTG, [place] Level minimum assist (75% patient effort) --    Bed Mobility PT LTG, Outcome --  goal ongoing       Goal: Transfer Training Goal 1 LTG- PT  Outcome: Ongoing (interventions implemented as appropriate)    03/04/17 1249 03/06/17 1126   Transfer Training PT LTG   Transfer Training PT LTG, Date Established 03/04/17 --    Transfer Training PT LTG, Time to Achieve 2 wks --    Transfer Training PT LTG, Activity Type sit to stand/stand to sit --    Transfer Training PT LTG, [place] Level minimum assist (75% patient effort) --    Transfer Training PT LTG, Outcome --  goal ongoing       Goal: Gait Training Goal LTG- PT  Outcome: Ongoing (interventions implemented as appropriate)    03/04/17 1249 03/06/17 1126   Gait Training PT LTG   Gait Training Goal PT LTG, Date Established 03/04/17 --    Gait Training Goal PT LTG, Time to Achieve 2 wks --    Gait Training Goal PT LTG, [place] Level minimum assist (75% patient effort) --    Gait Training Goal PT LTG, Assist Device walker, rolling --    Gait Training Goal PT LTG, Distance to Achieve 50 --    Gait Training Goal PT LTG, Outcome --  goal ongoing

## 2017-03-06 NOTE — PLAN OF CARE
Problem: Patient Care Overview (Adult)  Goal: Plan of Care Review  Outcome: Ongoing (interventions implemented as appropriate)    03/06/17 1119   Coping/Psychosocial Response Interventions   Plan Of Care Reviewed With patient   Patient Care Overview   Progress improving   Outcome Evaluation   Outcome Summary/Follow up Plan Pt issued and educated on use of shoe horn, LH sponge, reacher, sock aid, and elastic shoe laces. Recommend discharge to [facility] with further skilled occupational therapy services.          Problem: Inpatient Occupational Therapy  Goal: Bed Mobility Goal LTG- OT  Outcome: Ongoing (interventions implemented as appropriate)    03/04/17 1159 03/06/17 1119   Bed Mobility OT LTG   Bed Mobility OT LTG, Date Established 03/04/17 --    Bed Mobility OT LTG, Time to Achieve 1 wk --    Bed Mobility OT LTG, Activity Type supine to sit/sit to supine --    Bed Mobility OT LTG, [place] Level minimum assist (75% patient effort) --    Bed Mobility OT LTG, Outcome --  goal ongoing       Goal: Transfer Training Goal 1 LTG- OT  Outcome: Ongoing (interventions implemented as appropriate)    03/04/17 1159 03/06/17 1119   Transfer Training OT LTG   Transfer Training OT LTG, Date Established 03/04/17 --    Transfer Training OT LTG, Time to Achieve 1 wk --    Transfer Training OT LTG, Activity Type bed to chair /chair to bed --    Transfer Training OT LTG, [place] Level minimum assist (75% patient effort);2 person assist required --    Transfer Training OT LTG, Assist Device walker, rolling --    Transfer Training OT LTG, Outcome --  goal ongoing       Goal: Bathing Goal LTG- OT  Outcome: Outcome(s) achieved Date Met:  03/06/17 03/04/17 1159 03/06/17 1119   Bathing OT LTG   Bathing Goal OT LTG, Date Established 03/04/17 --    Bathing Goal OT LTG, Time to Achieve 1 wk --    Bathing Goal OT LTG, Activity Type simulates;lower body bathing --    Bathing Goal OT LTG, [place] Level minimum assist (75% patient  effort) --    Bathing Goal OT LTG, Assist Device sponge, long handled --    Bathing Goal OT LTG, Outcome --  goal met       Goal: LB Dressing Goal LTG- OT  Outcome: Ongoing (interventions implemented as appropriate)    03/04/17 1159 03/06/17 1119   LB Dressing OT LTG   LB Dressing Goal OT LTG, Date Established 03/04/17 --    LB Dressing Goal OT LTG, Time to Achieve 1 wk --    LB Dressing Goal OT LTG, [place] Level minimum assist (75% patient effort) --    LB Dressing Goal OT LTG, Adaptive Equipment reacher;shoe horn, long handled;sock-aid --    LB Dressing Goal OT LTG, Outcome --  goal ongoing  (Issued and educated on AE)

## 2017-03-06 NOTE — DISCHARGE PLACEMENT REQUEST
"GLORIA HUDSON, RN    [phone]          Elliot Crystal (46 y.o. Male)     Date of Birth Social Security Number Address Home Phone MRN    1970  [address] [place], [place] [id number] [phone] [id number]    [employer] Mar[employer] Status          P[employer]al        Admission Date Admission Type Admitting Provider Attending Provider Department, Room/Bed    2/28/17 Emergency Unruly Lopez MD Bansal, Pankaj, MD [address], S[address]/1    Discharge Date Discharge Disposition Discharge Destination                      Attending Provider: Unruly Lopez MD     Allergies:  No Known Drug Allergy    Isolation:  None   Infection:  MRSA (03/03/17)   Code Status:  FULL    Ht:  75\" (190.5 cm)   Wt:  263 lb 1.6 oz (119 kg)    Admission Cmt:  None   Principal Problem:  Sepsis [A41.9]                 Active Insurance as of 2/28/2017     Primary Coverage     Payor Plan Insurance Group Employer/Plan Group    [employer] MEDICAID HU[employer] [facility]     Payor Plan Address Payor Plan Phone Number Effective From Effective To    PO  [phone] 1/18/2017     [place] [address]       Subscriber Name Subscriber Birth Date Member ID       ELLIOT CRYSTAL 1970 [id number]                 Emergency Contacts      (Rel.) Home Phone Work Phone Mobile Phone    Cindy Crystal (Spouse) [phone] -- [phone]               History & Physical      Melisa Boles MD at 2/28/2017  6:50 PM              [facility] Medicine Services  HISTORY AND PHYSICAL    Primary Care Physician: Juan Rios MD    Subjective     Chief Complaint:    Left foot wound with purulence and pain    History of Present Illness:     Noncompliant 47yo with uncontrolled DM2 and related neuropathy and diabetic left foot ulcers s/p recent 3rd-5th left metatarsal amputations by Dr. Martinez 1/30/17 and was d/c'd at that time to [facility] for wounc care and IV abx.  He has since been home and " "followed for outpt IV abx by Dr. Garcia. He was started on wound vac during a subsequent hospitalization related to poor wound healing 2/13-2/17/17 and had a follow up appointment yesterday with Dr. Dong, who told him that there was continued foot deterioration and \"wound vac was not working\" (pt has had it off since yesterday since \"there is no point in it\"). He appears to have some element of self neglect and disinterest in the severity of his predicament, possibly underlying depression.  He has been admittedly noncompliant with his outpt infusions of IV Rocephin and Dapto.  Pt states has felt \"sick\" past 2-3 days with fever to 101.6 by home thermometer, malaise, worsened left foot pain and foul smelling drainage from wound, associated nausea and subjective fever/chills.       Review of Systems   Constitutional: Positive for appetite change, chills, fatigue and fever.   Respiratory: Negative for shortness of breath.    Cardiovascular: Negative for chest pain.        LLE distal edema    Skin:        Foul smelling purulence from left foot wound with surrounding warm erythema   Psychiatric/Behavioral:        Self neglect, depressed mood      Otherwise complete ROS performed and negative except as mentioned in the HPI.    Past Medical History:   Past Medical History   Diagnosis Date   • Arthritis    • Cirrhosis    • Counseling for insulin pump    • Diabetes mellitus    • Encephalopathy, hepatic    • H/O degenerative disc disease    • History of Lissa's gangrene    • Hyperlipemia    • Hypertension    • multiple Skin abscesses    • DUNLAP, bx showed stage IV fibrosis    • Neuropathy        Past Surgical History:  Past Surgical History   Procedure Laterality Date   • Testicle surgery       DEBRIDEMENT FROM GANGRENE   • Leg surgery     • Amputation digit Left 1/30/2017     Procedure: left fourth and fifth transmetatarsal toe amputation ;  Surgeon: Juancho Martinez MD;  Location: [facility];  Service:        Family " History: family history includes Arthritis in his father and mother; Cancer in his mother; Diabetes in his brother and father; Heart attack in his father; Hyperlipidemia in his father; Hypertension in his brother, father, and mother; Kidney disease in his mother; Mental illness in his sister; Obesity in his brother; Stroke in his mother.    Social History:  reports that he has never smoked. He has never used smokeless tobacco. He reports that he does not drink alcohol or use illicit drugs.    Medications:  Prescriptions Prior to Admission   Medication Sig Dispense Refill Last Dose   • aspirin 81 MG EC tablet Take 1 tablet by mouth Daily.   Past Week at Unknown time   • atorvastatin (LIPITOR) 40 MG tablet Take 1 tablet by mouth Every Night.   Past Week at Unknown time   • gabapentin (NEURONTIN) 400 MG capsule Take 3 capsules by mouth 3 (Three) Times a Day.   2/28/2017 at Unknown time   • insulin detemir (LEVEMIR) 100 UNIT/ML injection Inject 30 Units under the skin Every 12 (Twelve) Hours.   Past Week at Unknown time   • insulin lispro (humaLOG) 100 UNIT/ML injection Inject 10 Units under the skin 3 (Three) Times a Day With Meals.   Past Week at Unknown time   • insulin lispro (humaLOG) 100 UNIT/ML injection Inject 2-7 Units under the skin 4 (Four) Times a Day With Meals & at Bedtime.   Past Week at Unknown time   • melatonin 5 MG sublingual tablet sublingual tablet Place 1 tablet under the tongue At Night As Needed (insomnia).  0 Past Week at Unknown time   • probiotic (CULTURELLE) capsule capsule Take 1 capsule by mouth Daily. 30 capsule  Past Week at Unknown time   • cefTRIAXone (ROCEPHIN) 40 MG/ML IVPB Infuse 50 mL into a venous catheter Daily. Indications: Bone and Joint Infection   Taking   • DAPTOmycin 750 mg in sodium chloride 0.9 % 50 mL Infuse 750 mg into a venous catheter Daily. Indications: Bone and Joint Infection   Taking   • docusate sodium 100 MG capsule Take 100 mg by mouth 2 (Two) Times a Day.    "Taking   • famotidine (PEPCID) 20 MG tablet Take 1 tablet by mouth 2 (Two) Times a Day.   Taking   • losartan (COZAAR) 100 MG tablet Take 1 tablet by mouth Daily. 30 tablet 0 Taking   • polyethylene glycol (MIRALAX) packet Take 17 g by mouth Daily.   Not Taking   • povidone-iodine (BETADINE) 10 % ointment Apply  topically Daily. To bilateral feet ulcerations  0 Taking   • sodium chloride 0.9 % solution    Taking       Allergies:  Allergies   Allergen Reactions   • No Known Drug Allergy          Objective     Physical Exam:  Vital Signs:   Visit Vitals   • /93   • Pulse 101   • Temp 98.5 °F (36.9 °C) (Oral)   • Resp 18   • Ht 75\" (190.5 cm)   • Wt 235 lb (107 kg)   • SpO2 90%   • BMI 29.37 kg/m2     Physical Exam  Temp:  [98.5 °F (36.9 °C)-99.6 °F (37.6 °C)] 99.6 °F (37.6 °C)  Heart Rate:  [] 101  Resp:  [16-18] 18  BP: (132-178)/() 178/101  Constitutional: no acute distress, awake, alert  Eyes: PERRLA, sclerae anicteric, no conjunctival injection  Neck: supple, no thyromegaly, trachea midline  Respiratory: Clear to auscultation bilaterally, nonlabored respirations   Cardiovascular: RRR, 2-3/6 crescendo murmur heard best at left lower sternal border, rubs, or gallops, palpable pedal pulses bilaterally  Gastrointestinal: Positive bowel sounds, soft, nontender, nondistended  Musculoskeletal: LLE 2+ pitting to distal tibia, no clubbing or cyanosis to bilateral lower extremities  Psychiatric: oriented x 3, depressed affect, cooperative  Neurologic: Strength symmetric in all extremities, Cranial Nerves grossly intact to confrontation   Derm: Dry margins of left third through fifth metatarsal wound with boggy foul-smelling purulence draining from central aspect of wound.  Wound has 2 cm circumferential confluent warm erythema which also extends laterally along the foot to the anterior portion of lateral malleolus          Results Reviewed:    Results from last 7 days  Lab Units 02/28/17  1553   WBC " 10*3/mm3 18.47*   HEMOGLOBIN g/dL 12.8*   PLATELETS 10*3/mm3 499*       Results from last 7 days  Lab Units 02/28/17  1553   SODIUM mmol/L 129*   POTASSIUM mmol/L 4.3   TOTAL CO2 mmol/L 26.0   CREATININE mg/dL 0.80   GLUCOSE mg/dL 255*   CALCIUM mg/dL 9.8       I have personally reviewed and interpreted available lab data, radiology studies and ECG obtained at time of admission.     Assessment / Plan     Assessment/Problem List:   Principal Problem:    Sepsis  Active Problems:    DUNLAP Cirrhosis    Essential hypertension    Non-compliance    Hyperlipemia    Poorly controlled type 2 diabetes mellitus with peripheral neuropathy    Osteomyelitis of left foot    Diabetic foot infection    Diabetic neuropathy      Noncompliant 45yo with uncontrolled DM2 and related neuropathy and diabetic left foot ulcers s/p recent 3rd-5th left metatarsal amputations by Dr. Martinez 1/30/17 and followed for outpt IV abx by Dr. Garcia.  He was recently readmitted 2/13-2/17/17 for consideration of further amputation but instead decision was made to cont aggressive abx and a wound vac was placed as well at discharge. Today he presents with noncompliance with his outpt IV abx and worsened left foot wounds, fevers at home, with foul smelling copious purulence, elevated inflammatory markers, leukocytosis and possibly and new heart murmur:      Plan:    - pt known to Bam Dong and Michelle from recent left metatarsal amputations on 1/30/17, consults placed. Suspect will now likely need BKA.   - reinitiate previous outpt IV Dapto and Rocephin abx orders (pt has been noncompliant with outpt IV abx) per Dr. Dong's prior plan  - ??new murmur (cursory review of recent notes does not mention murmur on exams but family says he has been told of murmur before so hx is unclear). Given concern of active OM will order ECHO for review of valvular structures to r/o endocartitis.  BCx's pending as well.   - basal/bolus aggressive FSBS control (last  A1c=14.9% on 1/23/17, will recheck since home insulins were aggressively adjusted at last d/c from hospital)      DVT prophylaxis:  Lovenox  Code Status:  FULL  Admission Status: Patient will be admitted to INPATIENT status due to the need for care which can only be reasonably provided in an hospital setting such as aggressive/expedited ancillary services and/or consultation services and the necessity for IV abx medications, close physician monitoring and/or the possible need for further amputation operative procedures.  In such, I feel patient’s risk for adverse outcomes and need for care warrant INPATIENT evaluation and predict the patient’s care encounter to likely last beyond 2 midnights.       Melisa Boles MD 02/28/17 6:51 PM           Electronically signed by Melisa Boles MD at 2/28/2017  8:35 PM           Physician Progress Notes (most recent note)      Unruly Lopez MD at 3/5/2017  1:17 PM  Version 1 of 1             [facility] Medicine Services    ASSESSMENT / PLAN             Sepsis, secondary to left foot infection, chronic foot ulcers, s/p recent 3rd-5th left metatarsal amputations by Dr. Martinez 1/30/17 , resolving   POD # 3 s/p L BKA  DUNLAP Cirrhosis  Essential hypertension, stable  Non-compliance  Hyperlipemia  Poorly controlled type 2 diabetes mellitus with peripheral neuropathy.   Lab Results   Component Value Date    HGBA1C 9.70 (H) 02/28/2017     SHMUEL negative for IE/vegetations   Osteomyelitis of left foot  Diabetic foot infection  Diabetic neuropathy     Plan--  Plan--  Pod 3 L BKA by  Dr Martinez    Afebrile today and leukocytosis resolved  , b/c mrsa   Continue antibiotics, , and discussed with  patient and wife at the bedside   Echo reviewed Continue Levemir as well as pre-meal insulin. Labile BS  Elevated ESR, CRP,ID possible discharge tomorrow to rtehab   Length of Stay 5 Days   Code- full code  DVT Prophylaxis- Lovenox  Condition--serious  Prognosis-- guarded, poor  long-term  Expected Discharge disposition in        1-2 days   Days to rehabilitation     SUBJECTIVE--HPI/ Events overnight / CC- Hospital Follow up visit/ ROS-not detailed ,  as performed below    Lying in bed , slept better with ambien, no other problems, phantomn pain better     Says lot of phantom pain Gen -no fevers, chills  CV-no chest pain, palpitations  Resp-no cough, dyspnea  GI-no N/V/D, abd pain      OBJECTIVE     Objective  Vital Sign Min/Max for last 24 hours  Temp  Min: 97.7 °F (36.5 °C)  Max: 98.7 °F (37.1 °C)   BP  Min: 154/113  Max: 164/106   Pulse  Min: 96  Max: 100   Resp  Min: 18  Max: 18   No Data Recorded   No Data Recorded      Intake/Output Summary (Last 24 hours) at 03/05/17 1317  Last data filed at 03/05/17 1156   Gross per 24 hour   Intake    250 ml   Output      0 ml   Net    250 ml           Gen/Constitutional-no acute distress  CV-RRR, S1 S2 normal, 2/6 systolic murmur  Resp-CTAB, no wheezes  Abd-soft, NT, ND, +BS  Ext-no edema, left BKA stump under dressing Neuro-A&Ox3, no focal deficits  Flat mood, not much interested in conversation   Skin, no new rashes over last 24 hours    Medications    Current Facility-Administered Medications:   •  acetaminophen (TYLENOL) tablet 650 mg, 650 mg, Oral, Q4H PRN, Melisa Boles MD, 650 mg at 03/01/17 2119  •  aspirin EC tablet 81 mg, 81 mg, Oral, Daily, Melisa Boles MD, 81 mg at 03/05/17 0922  •  DAPTOmycin (CUBICIN) 700 mg in sodium chloride 0.9 % 50 mL IVPB, 700 mg, Intravenous, Q24H, Usman Dong MD, Last Rate: 100 mL/hr at 03/05/17 1156, 700 mg at 03/05/17 1156  •  dextrose (D50W) solution 25 g, 25 g, Intravenous, Q15 Min PRN, Melisa Boles MD  •  dextrose (GLUTOSE) oral gel 15 g, 15 g, Oral, Q15 Min PRN, Melisa Boles MD  •  docusate sodium (COLACE) capsule 100 mg, 100 mg, Oral, BID, Melisa Boles MD, 100 mg at 03/05/17 0921  •  famotidine (PEPCID) tablet 20 mg, 20 mg, Oral, BID, Melisa Boles MD, 20 mg at 03/05/17 0922  •  gabapentin (NEURONTIN)  capsule 1,200 mg, 1,200 mg, Oral, Q8H, Melisa Boles MD, 1,200 mg at 03/05/17 0548  •  glucagon (GLUCAGEN) injection 1 mg, 1 mg, Subcutaneous, Q15 Min PRN, Melisa Boles MD  •  HYDROcodone-acetaminophen (NORCO)  MG per tablet 1 tablet, 1 tablet, Oral, Q6H PRN, Unruly Lopez MD, 1 tablet at 03/05/17 0931  •  insulin detemir (LEVEMIR) injection 30 Units, 30 Units, Subcutaneous, Nightly, Casie M Mayne, PA, 30 Units at 03/04/17 2206  •  insulin lispro (humaLOG) injection 0-9 Units, 0-9 Units, Subcutaneous, 4x Daily With Meals & Nightly, Melisa Boles MD, 2 Units at 03/05/17 0922  •  losartan (COZAAR) tablet 100 mg, 100 mg, Oral, Q24H, Melisa Boles MD, 100 mg at 03/05/17 1057  •  magnesium sulfate in D5W 1g/100mL (PREMIX) IVPB 1 g, 1 g, Intravenous, PRN **OR** magnesium sulfate 8 g in dextrose (D5W) 5 % 250 mL infusion, 8 g, Intravenous, PRN **OR** magnesium sulfate 6 g in dextrose (D5W) 5 % 250 mL infusion, 6 g, Intravenous, PRN **OR** magnesium sulfate in D5W 1g/100mL (PREMIX) IVPB 1 g, 1 g, Intravenous, PRN, Unruly Lopez MD  •  Morphine sulfate (PF) injection 1 mg, 1 mg, Intravenous, Q4H PRN, 1 mg at 03/04/17 1331 **AND** naloxone (NARCAN) injection 0.4 mg, 0.4 mg, Intravenous, Q5 Min PRN, Melisa Boles MD  •  ondansetron (ZOFRAN) injection 4 mg, 4 mg, Intravenous, Q6H PRN, Melisa Boles MD, 4 mg at 03/01/17 0944  •  piperacillin-tazobactam (ZOSYN) 4.5 g/100 mL 0.9% NS IVPB (mbp), 4.5 g, Intravenous, Q6H, Usman Dong MD, 4.5 g at 03/05/17 1057  •  polyethylene glycol (MIRALAX) powder 17 g, 17 g, Oral, Daily, Melisa Boles MD, 17 g at 02/28/17 2008  •  probiotic (CULTURELLE) capsule 1 capsule, 1 capsule, Oral, Daily, Melisa Boles MD, 1 capsule at 03/05/17 0921  •  sodium chloride 0.9 % flush 1-10 mL, 1-10 mL, Intravenous, PRN, Melisa Boles MD  •  sodium chloride 0.9 % flush 10 mL, 10 mL, Intravenous, PRN, Carlos Brothers MD  •  sodium chloride 0.9 % infusion, 100 mL/hr, Intravenous, Continuous, Melisa Boles MD,  Last Rate: 100 mL/hr at 03/03/17 2111, 100 mL/hr at 03/03/17 2111  •  zolpidem (AMBIEN) tablet 5 mg, 5 mg, Oral, Nightly PRN, Unruly Lopez MD  I have reviewed the labs, culture data, radiology results, and diagnostic studies.    Results from last 7 days  Lab Units 03/05/17  0715 03/04/17  0726 03/02/17  0908   SODIUM mmol/L 136 132 133   POTASSIUM mmol/L 3.8 3.8 3.7   CHLORIDE mmol/L 103 101 102   TOTAL CO2 mmol/L 27.0 25.0 26.0   BUN mg/dL 16 21 28*   CREATININE mg/dL 0.50* 0.50* 0.80   CALCIUM mg/dL 8.0* 7.8* 7.7*   BILIRUBIN mg/dL 0.3 0.3  --    ALK PHOS U/L 256* 175*  --    ALT (SGPT) U/L 42* 27  --    AST (SGOT) U/L 50* 30  --    GLUCOSE mg/dL 167* 194* 162*       Results from last 7 days  Lab Units 03/05/17  0715 03/04/17  0558 03/02/17  0813   WBC 10*3/mm3 10.18 10.09 12.24*   HEMOGLOBIN g/dL 9.6* 9.6* 10.8*   HEMATOCRIT % 29.5* 29.5* 32.8*   PLATELETS 10*3/mm3 252 252 272           Culture Data:    Radiology Results:  Imaging Results (last 24 hours)     Procedure Component Value Units Date/Time    XR Foot 3+ View Left [[id number]] Collected:  03/01/17 1036     Updated:  03/01/17 1057    Narrative:       EXAMINATION: XR FOOT 3+ VW LEFT-      INDICATION: Osteomyelitis.      COMPARISON: Compared to MR datasets 01/25/2017.     FINDINGS:   1. The third, fourth and fifth digits of the left foot have been  resected from the metatarsals distally.  2. There is rarefaction and osteoporosis with cortical mineral loss of  the head of the second metatarsal, however, active focal erosion is not  identified.  3. There is degenerative disease in the midfoot structures without  erosion. Hindfoot structures are unremarkable and the left lower leg and  ankle are unremarkable.           Impression:       1. Status post amputation third, fourth and fifth digits or raise of the  left foot involving the third, fourth and fifth metatarsals and toes.  2. Active bone erosion, fracture or destructive process is not  identified from a  conventional image standpoint currently.     D:  02/28/2017  E:  03/01/2017     This report was finalized on 3/1/2017 10:55 AM by Dr. Shane Villa MD.           *. Please note that portions of this note were completed with a voice recognition program. Efforts were made to edit the dictations, but occasionally words are mistranscribed.  Unruly Lopez MD03/05/171:17 PM       Electronically signed by Unruly Lopez MD at 3/5/2017  1:19 PM           Consult Notes (most recent note)      Usman Dong MD at 3/1/2017  9:15 AM  Version 1 of 1     Consult Orders:    1. Inpatient Consult to Infectious Diseases [[id number]] ordered by Melisa Boles MD at 02/28/17 1850                Kendrick Crystal  1970  [id number]    Date of Consult: 3/1/2017    2/28/2017      Requesting Provider: No ref. provider found  Evaluating Physician: Usman Dong MD    Chief Complaint: sepsis    Reason for Consultation:sepsis, left foot osteo    History of present illness:    Patient is a 46 y.o.  Yr old male with history of uncontrolled diabetes with extensive comorbidity as previously outlined. He has lower extremity vascular disease but no specific option for revascularization per discussion with Dr. Martinez. He was admitted January 2017 with left foot infection, MRI not confirming osteomyelitis, but had surgery on January 30 with metatarsals 3/4/5 amputated, marginal perfusion per operative note and MRSA/GB strep/coag-negative staph in his various cultures. He was transferred to [facility] hospital on broad-spectrum IV antibiotics. While at [facility], he was under the care of Dr. Herrera and, per family/patient, he was told that he required hyperbaric oxygen therapy and had very little chance for long-term healing at the foot. He  was readmitted on February 13 to [facility] with patient initially interested in having higher level amputation.  After discussion with Dr. Martinez, he changed his mind and wanted attempt at salvage again  with IV antibiotics/wound care.  He was discharged February 17 as outlined in our prior inpatient notes.  He was noncompliant with follow-up in the office the week of February 20 despite our calls to the patient.  He then stopped antibiotics on his own Wednesday, February 22 and did not notify us.  His family was able to get him to come into the office on February 27 at which point he refused IV antibiotics/insisted PICC line be removed and he refused hospitalization/VAC,  understanding the risks of his behavior/decisions as outlined in my office note.  He was agreeable for prescription of oral agents and went home to consider his options.  On February 28, he called and said he was willing to come to the hospital.  He presented to the emergency room, found to have fever/leukocytosis consistent with sepsis and subsequently found to be bacteremic with MRSA.     He denies headache photophobia or neck stiffness.  No chest pain palpitation or syncope.  He denies shortness of breath cough or hemoptysis.  No skin rash.  No dysuria hematuria or pyuria.  Left foot has remained blackened on the medial side with vague edema/redness that he describes as not progressive and 24 hours.  No other new exposures    No raw or undercooked food.  No unpasteurized milk or milk products.  No animal insect or arthropod exposure.  No tick bites.  No outdoor camping or hunting exposure.  No travel exposure.  No ill exposure.  No history of TB or TB exposure.   Denies a history of VRE and no history of C. difficile or ESBL/KPC  organisms.        Past Medical History   Diagnosis Date   • Arthritis    • Cirrhosis    • Counseling for insulin pump    • Diabetes mellitus    • Encephalopathy, hepatic    • H/O degenerative disc disease    • History of Lissa's gangrene    • Hyperlipemia    • Hypertension    • multiple Skin abscesses    • DUNLAP, bx showed stage IV fibrosis    • Neuropathy        Past Surgical History   Procedure Laterality Date   •  Testicle surgery       DEBRIDEMENT FROM GANGRENE   • Leg surgery     • Amputation digit Left 1/30/2017     Procedure: left fourth and fifth transmetatarsal toe amputation ;  Surgeon: Juancho Martinez MD;  Location: [facility] OR;  Service:        Pediatric History   Patient Guardian Status   • Not on file.     Other Topics Concern   • Not on file     Social History Narrative       family history includes Arthritis in his father and mother; Cancer in his mother; Diabetes in his brother and father; Heart attack in his father; Hyperlipidemia in his father; Hypertension in his brother, father, and mother; Kidney disease in his mother; Mental illness in his sister; Obesity in his brother; Stroke in his mother.    Allergies   Allergen Reactions   • No Known Drug Allergy        Medication:  Current Facility-Administered Medications   Medication Dose Route Frequency Provider Last Rate Last Dose   • acetaminophen (TYLENOL) tablet 650 mg  650 mg Oral Q4H PRN Melisa Boles MD   650 mg at 02/28/17 2147   • aspirin EC tablet 81 mg  81 mg Oral Daily Melisa Boles MD   81 mg at 02/28/17 2008   • dextrose (D50W) solution 25 g  25 g Intravenous Q15 Min PRN Melisa Boles MD       • dextrose (GLUTOSE) oral gel 15 g  15 g Oral Q15 Min PRN Melisa Boles MD       • docusate sodium (COLACE) capsule 100 mg  100 mg Oral BID Melisa Boles MD   100 mg at 02/28/17 2008   • enoxaparin (LOVENOX) syringe 40 mg  40 mg Subcutaneous Q AM Melisa Boles MD   40 mg at 03/01/17 0535   • famotidine (PEPCID) tablet 20 mg  20 mg Oral BID Melisa Boles MD   20 mg at 02/28/17 2008   • gabapentin (NEURONTIN) capsule 1,200 mg  1,200 mg Oral Q8H Melisa Boles MD   1,200 mg at 03/01/17 0535   • glucagon (GLUCAGEN) injection 1 mg  1 mg Subcutaneous Q15 Min PRN Melisa Boles MD       • insulin detemir (LEVEMIR) injection 30 Units  30 Units Subcutaneous Nightly Casie M Mayne, PA       • insulin lispro (humaLOG) injection 0-9 Units  0-9 Units Subcutaneous 4x Daily With Meals & Nightly  Melisa Boles MD   2 Units at 02/28/17 2133   • insulin lispro (humaLOG) injection 10 Units  10 Units Subcutaneous TID With Meals Melisa Boles MD   10 Units at 02/28/17 2008   • losartan (COZAAR) tablet 100 mg  100 mg Oral Q24H Melisa Boles MD   100 mg at 02/28/17 2010   • Morphine sulfate (PF) injection 1 mg  1 mg Intravenous Q4H PRN Melisa Boles MD        And   • naloxone (NARCAN) injection 0.4 mg  0.4 mg Intravenous Q5 Min PRN Melisa Boles MD       • ondansetron (ZOFRAN) injection 4 mg  4 mg Intravenous Q6H PRN Melisa Boles MD       • oxyCODONE-acetaminophen (PERCOCET) 5-325 MG per tablet 1 tablet  1 tablet Oral Q4H PRN Melisa Boles MD   1 tablet at 02/28/17 1935   • Pharmacy to dose vancomycin   Does not apply Continuous PRN Usman Dong MD       • piperacillin-tazobactam (ZOSYN) 4.5 g/100 mL 0.9% NS IVPB ([facility])  4.5 g Intravenous Q6H Usman Dong MD       • polyethylene glycol (MIRALAX) powder 17 g  17 g Oral Daily Melisa Boles MD   17 g at 02/28/17 2008   • probiotic (CULTURELLE) capsule 1 capsule  1 capsule Oral Daily Melisa Boles MD   1 capsule at 02/28/17 2008   • sodium chloride 0.9 % flush 1-10 mL  1-10 mL Intravenous PRN Melisa Boles MD       • sodium chloride 0.9 % flush 10 mL  10 mL Intravenous PRN Carlos Brothers MD       • sodium chloride 0.9 % infusion  100 mL/hr Intravenous Continuous Melisa Boles  mL/hr at 03/01/17 0537 100 mL/hr at 03/01/17 0537   • vancomycin 2000 mg/500 mL 0.9% NS IVPB ([facility])  2,000 mg Intravenous Once Juancho Kathleen RPH       • vancomycin IVPB 1750 mg in 0.9% Sodium Chloride (premix) 500 mL  1,750 mg Intravenous Q12H Juancho Kathleen RPH           Antibiotics:  IV Anti-Infectives     Ordered     Dose/Rate Route Frequency Start Stop    03/01/17 0914  vancomycin IVPB 1750 mg in 0.9% Sodium Chloride (premix) 500 mL     Ordering Provider:  Juancho Kathleen RPH    1,750 mg  over 120 Minutes Intravenous Every 12 Hours 03/01/17 2200      03/01/17 0831  piperacillin-tazobactam (ZOSYN)  4.5 g/100 mL 0.9% NS IVPB (mbp)     Ordering Provider:  Usman Dong MD    4.5 g Intravenous Every 6 Hours 03/01/17 1000      03/01/17 0848  vancomycin 2000 mg/500 mL 0.9% NS IVPB ([facility])     Ordering Provider:  Juancho Kathleen RPH    2,000 mg  over 120 Minutes Intravenous Once 03/01/17 0930      03/01/17 0843  Pharmacy to dose vancomycin     Ordering Provider:  Usman Dong MD     Does not apply Continuous PRN 03/01/17 0841      02/28/17 1753  DAPTOmycin (CUBICIN) 450 mg in sodium chloride 0.9 % 50 mL IVPB     [facility] reviewed the order on 02/28/17 1936.   Ordering Provider:  Usman Dong MD    450 mg  100 mL/hr over 30 Minutes Intravenous Every 24 Hours 02/28/17 1800 02/28/17 2040    02/28/17 1753  cefTRIAXone (ROCEPHIN) IVPB 1 g     Ordering Provider:  Carlos Brothers MD    1 g  over 30 Minutes Intravenous Once 02/28/17 1755 02/28/17 1842            Review of Systems    Constitutional-- appetite diminished with generalized malaise and fatigue  Heent-- No new vision, hearing or throat complaints.  No epistaxis or oral sores.  Denies odynophagia or dysphagia.  No flashers, floaters or eye pain. No odynophagia or dysphagia. No headache, photophobia or neck stiffness.  CV-- No chest pain, palpitation or syncope  Resp-- No SOB/cough/Hemoptysis  GI- No nausea, vomiting, or diarrhea.  No hematochezia, melena, or hematemesis. Denies jaundice or chronic liver disease.  -- No dysuria, hematuria, or flank pain.  Denies hesitancy, urgency or flank pain.  Lymph- no swollen lymph nodes in neck/axilla or groin.   Heme- No active bruising or bleeding; no Hx of DVT or PE.  MS-- no swelling or pain in the bones or joints of arms/legs aside from above.  No new back pain.  Neuro-- No acute focal weakness or numbness in the arms or legs.  No seizures.    Full 12 point review of systems reviewed and negative otherwise for acute complaints, except for    Physical Exam:   Vital Signs   Visit Vitals  "  • /68 (BP Location: Left arm, Patient Position: Lying)   • Pulse 96   • Temp 98.1 °F (36.7 °C) (Oral)   • Resp 18   • Ht 75\" (190.5 cm)   • Wt 234 lb 6.4 oz (106 kg)   • SpO2 90%   • BMI 29.3 kg/m2       GENERAL: Awake and alert, in no acute distress.   HEENT: Normocephalic, atraumatic.  PERRL. EOMI. No conjunctival injection. No icterus. Oropharynx clear without evidence of thrush or exudate. No evidence of peridontal disease.    NECK: Supple without nuchal rigidity. No mass.  LYMPH: No cervical, axillary or inguinal lymphadenopathy.  HEART: RRR; soft systolic murmur left sternal border, rubs, gallops.   LUNGS: Clear to auscultation bilaterally without wheezing, rales, rhonchi. Normal respiratory effort. Nonlabored. No dullness.  ABDOMEN: Soft, nontender, nondistended. Positive bowel sounds. No rebound or guarding. NO mass or HSM.  EXT:  No cyanosis, clubbing or edema. No cord.  : Genitalia generally unremarkable.  Without Walker catheter.  MSK: FROM without joint effusions noted arms/legs.    SKIN: Warm and dry without cutaneous eruptions on Inspection/palpation.    NEURO: Oriented to PPT. No focal deficits on motor/sensory exam at arms/legs.  PSYCHIATRIC: Normal insight and judgement. Cooperative with PE    Left foot with gangrene/black tissue on the medial side with purulence and vague surrounding erythema/edema.  No discrete mass bulge or fluctuance.  No crepitus or bulla    No peripheral stigmata/phenomena of endocarditis    Laboratory Data      Results from last 7 days  Lab Units 02/28/17  1553   WBC 10*3/mm3 18.47*   HEMOGLOBIN g/dL 12.8*   HEMATOCRIT % 38.2*   PLATELETS 10*3/mm3 499*       Results from last 7 days  Lab Units 03/01/17  0710   SODIUM mmol/L 132   POTASSIUM mmol/L 3.8   CHLORIDE mmol/L 98*   TOTAL CO2 mmol/L 25.0   BUN mg/dL 19   CREATININE mg/dL 0.60   GLUCOSE mg/dL 152*   CALCIUM mg/dL 9.2           Results from last 7 days  Lab Units 02/28/17  1553   SED RATE mm/hr 127* "       Results from last 7 days  Lab Units 02/28/17  1553   CRP mg/dL 135.10*       Estimated Creatinine Clearance: 198.3 mL/min (by C-G formula based on Cr of 0.6).      Microbiology:      Radiology:  Imaging Results (last 72 hours)     Procedure Component Value Units Date/Time    XR Foot 3+ View Left [[id number]]      Updated:  02/28/17 3010            Impression:   --MRSA bacteremia/sepsis.  Likely associated with his left foot osteomyelitis/infection, with general progression of illness likely as a direct result of his medical noncompliance and recent refusal of care.  He and his family understand the implication of his recent behavior.  They're now agreeable to treatment including surgery/IV antibiotics.  He understands potential further serious morbidity and other serious sequela/mortality including metastatic foci of involvement, risk for endovascular involvement/endocarditis and that antibiotics/surgery are not a guarantee for cure.  In light of recent daptomycin exposure, we will use vancomycin for the MRSA until further EYAD data known.  Risk for possible evolving daptomycin resistance on daptomycin.    --Left foot gangrene/osteomyelitis with progression as above.  He is now agreeable to surgery and I have discussed directly with Dr. Martinez.  Option/timing/threshold per Dr. Martinez, although I did discuss with Dr. Martinez the positive blood culture/septic presentation and that in general would be worthwhile to pursue surgery ASAP.  Broad mixed coverage with vancomycin/Zosyn.    --Medical noncompliance as above    --Diabetes type 2.  You need to tightly control blood sugar to give best chance for healing    --Peripheral vascular disease as previously outlined    --Heart murmur by exam associated with the MRSA bacteremia.  He needs to have SHMUEL to evaluate for possible endocarditis    PLAN: Thank you for asking us to see Kendrick Crystal, I recommend the following:    --IV vancomycin/Zosyn    --I discussed potential  risks and benefits of the prescribed antibiotics that include, but are not limited to, solid organ toxicity, neuro/bala/vestibular toxicity, renal toxicity, CDiff, cytopenias, hypersensitivity,  etc.. Patient/Family voice understanding and agree to proceed.    --Monitor IV and IV antibiotics with risk for systemic complication and potential drug interaction    --Check/review labs cultures and scans    --Highly complex set of issues with high risk for further serious morbidity and other serious sequela    --Discussed with patient's family, Dr. Martinez, and Dr. Lopez, and microbiology.    --Dr. Martinez to arrange SHMUEL       Usman Dong MD  3/1/2017                 Electronically signed by Usman Dong MD at 3/1/2017  9:28 AM           Physical Therapy Notes (most recent note)      Ailyn Marcus, PT at 3/4/2017 12:58 PM  Version 1 of 1         Acute Care - Physical Therapy Initial Evaluation  [facility]     Patient Name: Kendrick Crystal  : 1970  MRN: [id number]  Today's Date: 3/4/2017   Onset of Illness/Injury or Date of Surgery Date: 17 (L BKA)  Date of Referral to PT: 17  Referring Physician: CANDY Stubbs      Admit Date: 2017     Visit Dx:    ICD-10-CM ICD-9-CM   1. Diabetic foot infection E11.69 250.80    L08.9 686.9   2. SIRS (systemic inflammatory response syndrome) R65.10 995.90   3. Osteomyelitis of left foot, unspecified chronicity M86.9 730.27   4. Impaired mobility and ADLs Z74.09 799.89   5. Impaired functional mobility, balance, gait, and endurance Z74.09 V49.89     Patient Active Problem List   Diagnosis   • DUNLAP Cirrhosis   • Essential hypertension   • Non-compliance   • Type 2 diabetes mellitus with hyperosmolarity without nonketotic hyperglycemic-hyperosmolar coma (nkhhc)   • Arthritis   • Hyperlipemia   • Hypertriglyceridemia, essential   • Diabetic foot ulcer associated with diabetes mellitus due to underlying condition   • Cellulitis of left foot   • Poorly  controlled type 2 diabetes mellitus with peripheral neuropathy   • DUNLAP (nonalcoholic steatohepatitis)   • Hyponatremia   • Neutrophilic leukocytosis   • Hypomagnesemia   • Encephalopathy, hepatic   • Hypertension   • Osteomyelitis of left foot   • Sepsis   • Diabetic foot infection   • Diabetic neuropathy     Past Medical History   Diagnosis Date   • Arthritis    • Cirrhosis    • Counseling for insulin pump    • Diabetes mellitus    • Encephalopathy, hepatic    • H/O degenerative disc disease    • History of Lissa's gangrene    • Hyperlipemia    • Hypertension    • multiple Skin abscesses    • DUNLAP, bx showed stage IV fibrosis    • Neuropathy      Past Surgical History   Procedure Laterality Date   • Testicle surgery       DEBRIDEMENT FROM GANGRENE   • Leg surgery     • Amputation digit Left 1/30/2017     Procedure: left fourth and fifth transmetatarsal toe amputation ;  Surgeon: Juancho Martinez MD;  Location:  [facility];  Service:    • Below knee amputation Left 3/2/2017     Procedure: AMPUTATION BELOW KNEE, SHMUEL;  Surgeon: Juancho Martinez MD;  Location:  [facility];  Service:           PT ASSESSMENT (last 72 hours)      PT Evaluation       03/04/17 1015 03/04/17 1011    Rehab Evaluation    Document Type evaluation  -LS evaluation  -AN    Subjective Information agree to therapy;complains of;pain   phantom pain. pt expressed fear of falling  -LS agree to therapy;complains of;pain  -AN    General Information    Patient Profile Review yes  -LS yes  -AN    Onset of Illness/Injury or Date of Surgery Date 03/03/17   L BKA  -LS 02/28/17  -AN    Referring Physician Enoc PT  -LAUREN Sanchez  -AN    General Observations  Pt supine, L limb elevated, spouse present  -AN    Pertinent History Of Current Problem Hx of uncontrolled DM & Lfoot ulcers s/p amputation of left 3rd-5th metatarsals 1/2017. Home w/wound vac w/which pt was noncompliant. To ED feeling sick, c/o fever, malaise, nausea, worsening L foot pain, foul smelling  drainage. Adm w/sepsis. S/P L BKA 3/2/17  -LS Pt had L toe 3-5 amputated on 1/30/16. pt non compliant with wound care and increased infection and wound. L BKA on 3/2/17  -AN    Precautions/Limitations fall precautions   new L BKA. poor safety awareness.  -LS fall precautions   new L BKA  -AN    Prior Level of Function independent:;gait;transfer;bed mobility;ADL's;driving   was using knee scooter prior  -LS independent:;gait;transfer;ADL's;driving   was using knee scooter prior  -AN    Equipment Currently Used at Home raised toilet;walker, rolling;shower chair   3 prong cane, ramp to enter home  - commode;cane, quad;raised toilet;walker, rolling   ramp  -AN    Plans/Goals Discussed With patient;spouse/S.O.;agreed upon  -LS patient and family;agreed upon  -AN    Risks Reviewed patient:;spouse/S.O.:;increased discomfort  -LS patient and family:;LOB;dizziness;increased discomfort;change in vital signs  -AN    Benefits Reviewed patient:;spouse/S.O.:;improve function;increase independence;increase strength;increase balance  -LS patient and family:;improve function;increase independence;increase strength;increase balance;increase knowledge  -AN    Barriers to Rehab medically complex;previous functional deficit  -LS medically complex;previous functional deficit  -AN    Living Environment    Lives With spouse  -LS spouse  -AN    Living Arrangements  apartment  -AN    Home Accessibility bed and bath on same level   bedroom & bathroom on first floor of 2 story home  -LS stairs within home;tub/shower is not walk in   pt can use 1st floor  -AN    Stair Railings at Home --   HR on each side of stairs inside per pt  -LS     Clinical Impression    Date of Referral to PT 03/03/17  -LS     PT Diagnosis Impaired functional mobility  -LS     Patient/Family Goals Statement Pt did not state a goal  -LS     Criteria for Skilled Therapeutic Interventions Met yes;treatment indicated  -LS     Rehab Potential good, to achieve stated therapy  goals  -LS     Vital Signs    Pre Systolic BP Rehab 154  -  -AN    Pre Treatment Diastolic   -  -AN    Post Systolic BP Rehab 165  -  -AN    Post Treatment Diastolic BP 98  -LS 98  -AN    Pretreatment Heart Rate (beats/min) 110  -  -AN    Posttreatment Heart Rate (beats/min) 103  -  -AN    Pain Assessment    Pain Assessment 0-10  -LS 0-10  -AN    Pain Score 9  -LS 9  -AN    Post Pain Score 9  -LS 9  -AN    Pain Type Surgical pain;Phantom pain  -LS Phantom pain;Surgical pain  -AN    Pain Location --   L residual lower extremity  -LS Other (Comment)   L residual limb  -AN    Pain Intervention(s) Repositioned   pt had pain meds pror to PT& OT evals  -LS Medication (See MAR);Repositioned;Ambulation/increased activity  -AN    Response to Interventions  tolerated  -AN    Cognitive Assessment/Intervention    Current Cognitive/Communication Assessment functional  -LS functional  -AN    Orientation Status oriented x 4  -LS oriented x 4  -AN    Follows Commands/Answers Questions 100% of the time  -% of the time  -AN    Personal Safety  mild impairment  -AN    Personal Safety Interventions fall prevention program maintained;gait belt  -LS fall prevention program maintained  -AN    ROM (Range of Motion)    General ROM --   R hip, knee, & ankle AROM WFL  -LS no range of motion deficits identified  -AN    General ROM Detail --   L hip AAROM WFL. Full L knee ext. L knee active flexion 45   -LS     MMT (Manual Muscle Testing)    General MMT Assessment --   R hip flexors, ankle dorsiflexors, & knee extensors 3+  -LS upper extremity strength deficits identified  -AN    General MMT Assessment Detail --   deferred RLE MMT  -LS 3+/5  -AN    Bed Mobility, Assessment/Treatment    Bed Mob, Supine to Sit, [place] moderate assist (50% patient effort);2 person assist required  -LS moderate assist (50% patient effort);2 person assist required;verbal cues required  -AN    Bed Mobility, Impairments  strength decreased;impaired balance  -LS strength decreased;impaired balance  -AN    Transfer Assessment/Treatment    Transfers, Bed-Chair [place] verbal cues required;moderate assist (50% patient effort);1 person + 1 person to manage equipment   stand pivot w/RW; needs A to maneuver walker  -LS verbal cues required;moderate assist (50% patient effort);2 person assist required  -AN    Transfers, Sit-Stand [place] moderate assist (50% patient effort);2 person assist required;verbal cues required  -LS verbal cues required;moderate assist (50% patient effort);2 person assist required  -AN    Transfers, Stand-Sit [place] moderate assist (50% patient effort);2 person assist required;verbal cues required  -LS verbal cues required;moderate assist (50% patient effort);2 person assist required  -AN    Transfers, Sit-Stand-Sit, Assist Device rolling walker  -LS rolling walker  -AN    Transfer, Safety Issues balance decreased during turns;sequencing ability decreased;weight-shifting ability decreased  -LS balance decreased during turns  -AN    Transfer, Impairments strength decreased;impaired balance  -LS impaired balance  -AN    Transfer, Comment stood 3 times. attempted to take steps fwd but unable to lift R foot off floor or scoot it fwd. On first 2 attempts, pt began pushing back and saying he had to sit & couldn't do it. Able to pivot to chair on 3rd attempt with assistance, vcs, and a lot of encouragement. Pt has poor safety awareness, and on one occasion while standing, let go of RW w/1 hand and tried to hold IV pole.  -LS Pt had difficulty pivoting foot, had shoe on R foot; much encouragement to complete task  -AN    Gait Assessment/Treatment    Gait, [place] Level unable to perform  -LS     Motor Skills/Interventions    Additional Documentation  Balance Skills Training (Group)  -AN    Balance Skills Training    Sitting-Level of Assistance  Close supervision  -AN    Sitting-Balance Support  Right  upper extremity supported;Left upper extremity supported  -AN    Sitting-Balance Activities  Lateral lean;Forward lean  -AN    Standing-Level of Assistance  Moderate assistance;x2  -AN    Static Standing Balance Support  assistive device  -AN    Standing-Balance Activities  Weight Shift R-L  -AN    Standing Balance # of Minutes  2  -AN    Therapy Exercises    Left Lower Extremity  10 reps;sitting;glut sets;quad sets;AAROM:;SAQ;hip abduction/adduction  -LS    Sensory Assessment/Intervention    Sensory Impairment --   absent sensation to deep pressure R foot.  -LS     Light Touch --   absent to light touch R lower leg;diminished R thigh  -LS     Deep Pressure --   intact to deep pressure R thigh & lower leg  -LS     Positioning and Restraints    Pre-Treatment Position in bed  -LS in bed  -AN    Post Treatment Position chair  -LS chair  -AN    In Chair notified nsg;sitting;call light within reach;with family/caregiver;encouraged to call for assist;heels elevated  -LS reclined;call light within reach;encouraged to call for assist;with family/caregiver  -AN      03/02/17 1707 03/01/17 1348    General Information    Equipment Currently Used at Home walker, rolling;commode;cane, straight  -CR commode;cane, straight;walker, rolling;wheelchair;raised toilet;shower chair  -SG    Living Environment    Lives With spouse  -CR spouse  -SG    Living Arrangements apartment  -CR     Home Accessibility no concerns  -CR     Stair Railings at Home none  -CR     Type of Financial/Environmental Concern none  -CR     Transportation Available car;family or friend will provide  -CR car;family or friend will provide  -SG      User Key  (r) = Recorded By, (t) = Taken By, (c) = Cosigned By    Initials Name Provider Type    AN Fela Anguiano, OT Occupational Therapist    EKTA Marcus, CANDY Physical Therapist    SG Christelle Cuevas     CR Radames Moser, RN Registered Nurse          Physical Therapy Education      Title: PT OT SLP Therapies (Active)     Topic: Physical Therapy (Active)     Point: Precautions (Done)    Learning Progress Summary    Learner Readiness Method Response Comment Documented by Status   Patient Acceptance E VU,NR educated pt to maintain L knee extension while lying in bed. also instructed pt to work on aps, QS, & GS X 10 reps 3X/day.  03/04/17 1246 Done                      User Key     Initials Effective Dates Name Provider Type Discipline     06/19/15 -  Ailyn Marcus, PT Physical Therapist PT                PT Recommendation and Plan  Anticipated Discharge Disposition: inpatient rehabilitation facility  Planned Therapy Interventions: balance training, bed mobility training, gait training, home exercise program, patient/family education, strengthening, transfer training  PT Frequency: daily, per priority policy  Plan of Care Review  Plan Of Care Reviewed With: patient, spouse  Outcome Summary/Follow up Plan: Pt presents with significant deficits in mobility. He has weakness of BUEs & his RLE & was unable to take steps forward using a RW. He is fearful of falling but has poor safety awareness. Pt would benefit from skilled PT services to address mobility deficits and improve function to maximize his level of independence. Pt would benefit from inpatient rehab after discharge from acute care hospital.          IP PT Goals       03/04/17 1249          Bed Mobility PT LTG    Bed Mobility PT LTG, Date Established 03/04/17  -LS      Bed Mobility PT LTG, Time to Achieve 2 wks  -LS      Bed Mobility PT LTG, Activity Type supine to sit/sit to supine  -LS      Bed Mobility PT LTG, [place] Level minimum assist (75% patient effort)  -LS      Bed Mobility PT LTG, Outcome goal ongoing  -LS      Transfer Training PT LTG    Transfer Training PT LTG, Date Established 03/04/17  -LS      Transfer Training PT LTG, Time to Achieve 2 wks  -LS      Transfer Training PT LTG, Activity Type sit to stand/stand  to sit  -LS      Transfer Training PT LTG, [place] Level minimum assist (75% patient effort)  -LS      Transfer Training PT LTG, Outcome goal ongoing  -LS      Gait Training PT LTG    Gait Training Goal PT LTG, Date Established 03/04/17  -LS      Gait Training Goal PT LTG, Time to Achieve 2 wks  -LS      Gait Training Goal PT LTG, [place] Level minimum assist (75% patient effort)  -LS      Gait Training Goal PT LTG, Assist Device walker, rolling  -LS      Gait Training Goal PT LTG, Distance to Achieve 50  -LS      Gait Training Goal PT LTG, Outcome goal ongoing  -LS        User Key  (r) = Recorded By, (t) = Taken By, (c) = Cosigned By    Initials Name Provider Type    LS Ailyn Marcus, PT Physical Therapist                Outcome Measures       03/04/17 1015 03/04/17 1011       How much help from another person do you currently need...    Turning from your back to your side while in flat bed without using bedrails? 2  -LS      Moving from lying on back to sitting on the side of a flat bed without bedrails? 2  -LS      Moving to and from a bed to a chair (including a wheelchair)? 2  -LS      Standing up from a chair using your arms (e.g., wheelchair, bedside chair)? 2  -LS      Climbing 3-5 steps with a railing? 1  -LS      To walk in hospital room? 1  -LS      AM-PAC 6 Clicks Score 10  -LS      How much help from another is currently needed...    Putting on and taking off regular lower body clothing?  2  -AN     Bathing (including washing, rinsing, and drying)  2  -AN     Toileting (which includes using toilet bed pan or urinal)  2  -AN     Putting on and taking off regular upper body clothing  2  -AN     Taking care of personal grooming (such as brushing teeth)  3  -AN     Eating meals  4  -AN     Score  15  -AN     Functional Assessment    Outcome Measure Options AM-PAC 6 Clicks Basic Mobility (PT)  -LS AM-PAC 6 Clicks Daily Activity (OT)  -AN       User Key  (r) = Recorded By, (t) = Taken By, (c)  = Cosigned By    Initials Name Provider Type    AN Fela Anguiano OT Occupational Therapist    LS Ailyn Marcus, PT Physical Therapist           Time Calculation:         PT Charges       17 1254          Time Calculation    Start Time 1015   tcc: 10min  -LS      PT Received On 17  -      PT Goal Re-Cert Due Date 17  -        User Key  (r) = Recorded By, (t) = Taken By, (c) = Cosigned By    Initials Name Provider Type     Ailyn Marcus PT Physical Therapist          Therapy Charges for Today     Code Description Service Date Service Provider Modifiers Qty    79236459282 HC PT THER PROC EA 15 MIN 3/4/2017 Ailyn Marcus, PT GP 1    66652725122 HC PT EVAL MOD COMPLEXITY 4 3/4/2017 Ailyn Marcus, PT GP 1          PT G-Codes  Outcome Measure Options: AM-PAC 6 Clicks Basic Mobility (PT)      Ailyn Marcus PT  3/4/2017          Electronically signed by Ailyn Marcus PT at 3/4/2017 12:58 PM           Occupational Therapy Notes (most recent note)      Fela Anguiano OT at 3/4/2017 12:02 PM  Version 1 of 1         Acute Care - Occupational Therapy Initial Evaluation  [facility]     Patient Name: Kendrick Crystal  : 1970  MRN: [id number]  Today's Date: 3/4/2017  Onset of Illness/Injury or Date of Surgery Date: 17  Date of Referral to OT: 17  Referring Physician: LAUREN Stubbs    Admit Date: 2017       ICD-10-CM ICD-9-CM   1. Diabetic foot infection E11.69 250.80    L08.9 686.9   2. SIRS (systemic inflammatory response syndrome) R65.10 995.90   3. Osteomyelitis of left foot, unspecified chronicity M86.9 730.27   4. Impaired mobility and ADLs Z74.09 799.89     Patient Active Problem List   Diagnosis   • DUNLAP Cirrhosis   • Essential hypertension   • Non-compliance   • Type 2 diabetes mellitus with hyperosmolarity without nonketotic hyperglycemic-hyperosmolar coma (nkhhc)   • Arthritis   • Hyperlipemia   • Hypertriglyceridemia, essential   •  Diabetic foot ulcer associated with diabetes mellitus due to underlying condition   • Cellulitis of left foot   • Poorly controlled type 2 diabetes mellitus with peripheral neuropathy   • DUNLAP (nonalcoholic steatohepatitis)   • Hyponatremia   • Neutrophilic leukocytosis   • Hypomagnesemia   • Encephalopathy, hepatic   • Hypertension   • Osteomyelitis of left foot   • Sepsis   • Diabetic foot infection   • Diabetic neuropathy     Past Medical History   Diagnosis Date   • Arthritis    • Cirrhosis    • Counseling for insulin pump    • Diabetes mellitus    • Encephalopathy, hepatic    • H/O degenerative disc disease    • History of Lissa's gangrene    • Hyperlipemia    • Hypertension    • multiple Skin abscesses    • DUNLAP, bx showed stage IV fibrosis    • Neuropathy      Past Surgical History   Procedure Laterality Date   • Testicle surgery       DEBRIDEMENT FROM GANGRENE   • Leg surgery     • Amputation digit Left 1/30/2017     Procedure: left fourth and fifth transmetatarsal toe amputation ;  Surgeon: Juancho Martinez MD;  Location:  [facility];  Service:    • Below knee amputation Left 3/2/2017     Procedure: AMPUTATION BELOW KNEE, SHMUEL;  Surgeon: Juancho Martinez MD;  Location:  [facility];  Service:           OT ASSESSMENT FLOWSHEET (last 72 hours)      OT Evaluation       03/04/17 1011 03/02/17 1707 03/01/17 1348 03/01/17 1347       Rehab Evaluation    Document Type evaluation  -AN        Subjective Information agree to therapy;complains of;pain  -AN        General Information    Patient Profile Review yes  -AN        Onset of Illness/Injury or Date of Surgery Date 02/28/17  -AN        Referring Physician LAUREN Stubbs  -AN        General Observations Pt supine, L limb elevated, spouse present  -AN        Pertinent History Of Current Problem Pt had L toe 3-5 amputated on 1/30/16. pt non compliant with wound care and increased infection and wound. L BKA on 3/2/17  -AN        Precautions/Limitations fall precautions   new L  BKA  -AN        Prior Level of Function independent:;gait;transfer;ADL's;driving   was using knee scooter prior  -AN        Equipment Currently Used at Home commode;cane, quad;raised toilet;walker, rolling   ramp  -AN walker, rolling;commode;cane, straight  -CR commode;cane, straight;walker, rolling;wheelchair;raised toilet;shower chair  -SG      Plans/Goals Discussed With patient and family;agreed upon  -AN        Risks Reviewed patient and family:;LOB;dizziness;increased discomfort;change in vital signs  -AN        Benefits Reviewed patient and family:;improve function;increase independence;increase strength;increase balance;increase knowledge  -AN        Barriers to Rehab medically complex;previous functional deficit  -AN        Living Environment    Lives With spouse  -AN spouse  -CR spouse  -SG      Living Arrangements apartment  -AN apartment  -CR       Home Accessibility stairs within home;tub/shower is not walk in   pt can use 1st floor  -AN no concerns  -CR       Stair Railings at Home  none  -CR       Type of Financial/Environmental Concern  none  -CR       Transportation Available  car;family or friend will provide  -CR car;family or friend will provide  -SG      Clinical Impression    Date of Referral to OT 02/28/17  -AN        OT Diagnosis Decreased ADL I  -AN        Impairments Found (describe specific impairments) gait, locomotion, and balance  -AN        Patient/Family Goals Statement Agreeable to OT services and rehab  -AN        Criteria for Skilled Therapeutic Interventions Met yes;treatment indicated  -AN        Rehab Potential good, to achieve stated therapy goals  -AN        Therapy Frequency daily  -AN        Anticipated Equipment Needs At Discharge bathing equipment;dressing equipment;reacher  -AN        Anticipated Discharge Disposition inpatient rehabilitation facility  -AN        Functional Level Prior    Ambulation  1-->assistive equipment  -CR  1-->assistive equipment  -SG      Transferring  1-->assistive equipment  -CR  1-->assistive equipment  -SG     Toileting  1-->assistive equipment  -CR  1-->assistive equipment  -SG     Bathing  2-->assistive person  -CR  2-->assistive person  -SG     Dressing  0-->independent  -CR  0-->independent  -SG     Eating  0-->independent  -CR  0-->independent  -SG     Communication  0-->understands/communicates without difficulty  -CR  0-->understands/communicates without difficulty  -SG     Swallowing  0-->swallows foods/liquids without difficulty  -CR       Vital Signs    Pre Systolic BP Rehab 154  -AN        Pre Treatment Diastolic   -AN        Post Systolic BP Rehab 165  -AN        Post Treatment Diastolic BP 98  -AN        Pretreatment Heart Rate (beats/min) 110  -AN        Posttreatment Heart Rate (beats/min) 103  -AN        Pain Assessment    Pain Assessment 0-10  -AN        Pain Score 9  -AN        Post Pain Score 9  -AN        Pain Type Phantom pain;Surgical pain  -AN        Pain Location Other (Comment)   L residual limb  -AN        Pain Intervention(s) Medication (See MAR);Repositioned;Ambulation/increased activity  -AN        Response to Interventions tolerated  -AN        Cognitive Assessment/Intervention    Current Cognitive/Communication Assessment functional  -AN        Orientation Status oriented x 4  -AN        Follows Commands/Answers Questions 100% of the time  -AN        Personal Safety mild impairment  -AN        Personal Safety Interventions fall prevention program maintained  -AN        ROM (Range of Motion)    General ROM no range of motion deficits identified  -AN        MMT (Manual Muscle Testing)    General MMT Assessment upper extremity strength deficits identified  -AN        General MMT Assessment Detail 3+/5  -AN        Bed Mobility, Assessment/Treatment    Bed Mob, Supine to Sit, [place] moderate assist (50% patient effort);2 person assist required;verbal cues required  -AN        Bed Mobility, Impairments  strength decreased;impaired balance  -AN        Transfer Assessment/Treatment    Transfers, Bed-Chair [place] verbal cues required;moderate assist (50% patient effort);2 person assist required  -AN        Transfers, Sit-Stand [place] verbal cues required;moderate assist (50% patient effort);2 person assist required  -AN        Transfers, Stand-Sit [place] verbal cues required;moderate assist (50% patient effort);2 person assist required  -AN        Transfers, Sit-Stand-Sit, Assist Device rolling walker  -AN        Transfer, Safety Issues balance decreased during turns  -AN        Transfer, Impairments impaired balance  -AN        Transfer, Comment Pt had difficulty pivoting foot, had shoe on R foot; much encouragement to complete task  -AN        Lower Body Bathing Assessment/Training    LB Bathing Assess/Train, Comment mod simulated  -AN        Upper Body Dressing Assessment/Training    UB Dressing Assess/Train, Clothing Type donning:;hospital gown  -AN        UB Dressing Assess/Train, Position sitting  -AN        UB Dressing Assess/Train, [place] minimum assist (75% patient effort)  -AN        Lower Body Dressing Assessment/Training    LB Dressing Assess/Train, Clothing Type donning:;shoes  -AN        LB Dressing Assess/Train, Position edge of bed  -AN        LB Dressing Assess/Train, [place] maximum assist (25% patient effort)  -AN        Motor Skills/Interventions    Additional Documentation Balance Skills Training (Group)  -AN        Balance Skills Training    Sitting-Level of Assistance Close supervision  -AN        Sitting-Balance Support Right upper extremity supported;Left upper extremity supported  -AN        Sitting-Balance Activities Lateral lean;Forward lean  -AN        Standing-Level of Assistance Moderate assistance;x2  -AN        Static Standing Balance Support assistive device  -AN        Standing-Balance Activities Weight Shift R-L  -AN        Standing Balance # of Minutes  2  -AN        Positioning and Restraints    Pre-Treatment Position in bed  -AN        Post Treatment Position chair  -AN        In Chair reclined;call light within reach;encouraged to call for assist;with family/caregiver  -AN          User Key  (r) = Recorded By, (t) = Taken By, (c) = Cosigned By    Initials Name Effective Dates    AN Fela Anguiano OT 06/22/15 -     SG Christelle Cuevas 05/02/16 -     CR Radames Moser RN 06/16/16 -            Occupational Therapy Education     Title: PT OT SLP Therapies (Active)     Topic: Occupational Therapy (Done)     Point: ADL training (Done)    Description: Instruct learner(s) on proper safety adaptation and remediation techniques during self care or transfers.   Instruct in proper use of assistive devices.    Learning Progress Summary    Learner Readiness Method Response Comment Documented by Status   Patient Acceptance CECILY COOK DU,NR Educated pt and family on assist needs for ADL's and txfrs. Educated on benefits of daily UE ex and ADL participation. AN 03/04/17 1157 Done   Family Acceptance CECILY COOK DU,NR Educated pt and family on assist needs for ADL's and txfrs. Educated on benefits of daily UE ex and ADL participation. AN 03/04/17 1157 Done               Point: Home exercise program (Done)    Description: Instruct learner(s) on appropriate technique for monitoring, assisting and/or progressing therapeutic exercises/activities.    Learning Progress Summary    Learner Readiness Method Response Comment Documented by Status   Patient Acceptance CECILY COOK DU,NR Educated pt and family on assist needs for ADL's and txfrs. Educated on benefits of daily UE ex and ADL participation. AN 03/04/17 1157 Done   Family Acceptance CECILY COOK,MADDIE,NR Educated pt and family on assist needs for ADL's and txfrs. Educated on benefits of daily UE ex and ADL participation. AN 03/04/17 1157 Done               Point: Precautions (Done)    Description: Instruct learner(s) on prescribed precautions  during self-care and functional transfers.    Learning Progress Summary    Learner Readiness Method Response Comment Documented by Status   Patient Acceptance CECILY COOK DU,NR Educated pt and family on assist needs for ADL's and txfrs. Educated on benefits of daily UE ex and ADL participation. AN 03/04/17 1157 Done   Family Acceptance CECILY COOK DU,NR Educated pt and family on assist needs for ADL's and txfrs. Educated on benefits of daily UE ex and ADL participation. AN 03/04/17 1157 Done                      User Key     Initials Effective Dates Name Provider Type Discipline    AN 06/22/15 -  Fela Anguiano OT Occupational Therapist OT                  OT Recommendation and Plan  Anticipated Equipment Needs At Discharge: bathing equipment, dressing equipment, reacher  Anticipated Discharge Disposition: inpatient rehabilitation facility  Therapy Frequency: daily  Plan of Care Review  Plan Of Care Reviewed With: patient, spouse  Outcome Summary/Follow up Plan: Continue OT to address decreased I with ADL's and functional mobilty this date. Recommend pt to inpt rehab following discharge. Pt would benefit from AE and DME to increase I and safetly.          OT Goals       03/04/17 1159          Bed Mobility OT LTG    Bed Mobility OT LTG, Date Established 03/04/17  -AN      Bed Mobility OT LTG, Time to Achieve 1 wk  -AN      Bed Mobility OT LTG, Activity Type supine to sit/sit to supine  -AN      Bed Mobility OT LTG, [place] Level minimum assist (75% patient effort)  -AN      Transfer Training OT LTG    Transfer Training OT LTG, Date Established 03/04/17  -AN      Transfer Training OT LTG, Time to Achieve 1 wk  -AN      Transfer Training OT LTG, Activity Type bed to chair /chair to bed  -AN      Transfer Training OT LTG, [place] Level minimum assist (75% patient effort);2 person assist required  -AN      Transfer Training OT LTG, Assist Device walker, rolling  -AN      Bathing OT LTG    Bathing Goal OT LTG, Date  Established 03/04/17  -AN      Bathing Goal OT LTG, Time to Achieve 1 wk  -AN      Bathing Goal OT LTG, Activity Type simulates;lower body bathing  -AN      Bathing Goal OT LTG, [place] Level minimum assist (75% patient effort)  -AN      Bathing Goal OT LTG, Assist Device sponge, long handled  -AN      LB Dressing OT LTG    LB Dressing Goal OT LTG, Date Established 03/04/17  -AN      LB Dressing Goal OT LTG, Time to Achieve 1 wk  -AN      LB Dressing Goal OT LTG, [place] Level minimum assist (75% patient effort)  -AN      LB Dressing Goal OT LTG, Adaptive Equipment reacher;shoe horn, long handled;sock-aid  -AN        User Key  (r) = Recorded By, (t) = Taken By, (c) = Cosigned By    Initials Name Provider Type    MARIA DOLORES Anguiano OT Occupational Therapist                Outcome Measures       03/04/17 1011          How much help from another is currently needed...    Putting on and taking off regular lower body clothing? 2  -AN      Bathing (including washing, rinsing, and drying) 2  -AN      Toileting (which includes using toilet bed pan or urinal) 2  -AN      Putting on and taking off regular upper body clothing 2  -AN      Taking care of personal grooming (such as brushing teeth) 3  -AN      Eating meals 4  -AN      Score 15  -AN      Functional Assessment    Outcome Measure Options AM-PAC 6 Clicks Daily Activity (OT)  -AN        User Key  (r) = Recorded By, (t) = Taken By, (c) = Cosigned By    Initials Name Provider Type    MARIA DOLORES Anguiano OT Occupational Therapist          Time Calculation:   OT Start Time: 1011    Therapy Charges for Today     Code Description Service Date Service Provider Modifiers Qty    [id number]  OT EVAL MOD COMPLEXITY 4 3/4/2017 Fela Anguiano OT GO 1               Fela Anguiano OT  3/4/2017     Electronically signed by Fela Anguiano OT at 3/4/2017 12:02 PM

## 2017-03-07 ENCOUNTER — APPOINTMENT (OUTPATIENT)
Dept: GENERAL RADIOLOGY | Facility: HOSPITAL | Age: 47
End: 2017-03-07

## 2017-03-07 ENCOUNTER — APPOINTMENT (OUTPATIENT)
Dept: CT IMAGING | Facility: HOSPITAL | Age: 47
End: 2017-03-07

## 2017-03-07 ENCOUNTER — HOSPITAL ENCOUNTER (EMERGENCY)
Facility: HOSPITAL | Age: 47
Discharge: HOME | End: 2017-03-08
Attending: EMERGENCY MEDICINE | Admitting: EMERGENCY MEDICINE

## 2017-03-07 VITALS
BODY MASS INDEX: 33.11 KG/M2 | WEIGHT: 266.31 LBS | RESPIRATION RATE: 18 BRPM | DIASTOLIC BLOOD PRESSURE: 94 MMHG | TEMPERATURE: 98.1 F | SYSTOLIC BLOOD PRESSURE: 160 MMHG | HEIGHT: 75 IN | HEART RATE: 96 BPM | OXYGEN SATURATION: 95 %

## 2017-03-07 DIAGNOSIS — M54.50 ACUTE RIGHT-SIDED LOW BACK PAIN WITHOUT SCIATICA: ICD-10-CM

## 2017-03-07 DIAGNOSIS — S80.02XA CONTUSION OF LEFT KNEE, INITIAL ENCOUNTER: Primary | ICD-10-CM

## 2017-03-07 LAB
ALBUMIN SERPL-MCNC: 2.9 G/DL (ref 3.2–4.8)
ALBUMIN/GLOB SERPL: 0.9 G/DL (ref 1.5–2.5)
ALP SERPL-CCNC: 242 U/L (ref 25–100)
ALT SERPL W P-5'-P-CCNC: 32 U/L (ref 7–40)
ANION GAP SERPL CALCULATED.3IONS-SCNC: 3 MMOL/L (ref 3–11)
AST SERPL-CCNC: 21 U/L (ref 0–33)
BILIRUB SERPL-MCNC: 0.3 MG/DL (ref 0.3–1.2)
BUN BLD-MCNC: 13 MG/DL (ref 9–23)
BUN/CREAT SERPL: 26 (ref 7–25)
C DIFF TOX GENS STL QL NAA+PROBE: NOT DETECTED
CALCIUM SPEC-SCNC: 8.5 MG/DL (ref 8.7–10.4)
CHLORIDE SERPL-SCNC: 102 MMOL/L (ref 99–109)
CK SERPL-CCNC: 68 U/L (ref 26–174)
CO2 SERPL-SCNC: 30 MMOL/L (ref 20–31)
CREAT BLD-MCNC: 0.5 MG/DL (ref 0.6–1.3)
CYTO UR: NORMAL
DEPRECATED RDW RBC AUTO: 45.6 FL (ref 37–54)
ERYTHROCYTE [DISTWIDTH] IN BLOOD BY AUTOMATED COUNT: 15.1 % (ref 11.3–14.5)
GFR SERPL CREATININE-BSD FRML MDRD: >150 ML/MIN/1.73
GLOBULIN UR ELPH-MCNC: 3.3 GM/DL
GLUCOSE BLD-MCNC: 199 MG/DL (ref 70–100)
GLUCOSE BLDC GLUCOMTR-MCNC: 179 MG/DL (ref 70–130)
GLUCOSE BLDC GLUCOMTR-MCNC: 199 MG/DL (ref 70–130)
HCT VFR BLD AUTO: 28.8 % (ref 38.9–50.9)
HGB BLD-MCNC: 9.2 G/DL (ref 13.1–17.5)
LAB AP CASE REPORT: NORMAL
LAB AP CLINICAL INFORMATION: NORMAL
Lab: NORMAL
MAGNESIUM SERPL-MCNC: 1.8 MG/DL (ref 1.3–2.7)
MCH RBC QN AUTO: 26.6 PG (ref 27–31)
MCHC RBC AUTO-ENTMCNC: 31.9 G/DL (ref 32–36)
MCV RBC AUTO: 83.2 FL (ref 80–99)
PATH REPORT.FINAL DX SPEC: NORMAL
PATH REPORT.GROSS SPEC: NORMAL
PLATELET # BLD AUTO: 283 10*3/MM3 (ref 150–450)
PMV BLD AUTO: 10.9 FL (ref 6–12)
POTASSIUM BLD-SCNC: 4.2 MMOL/L (ref 3.5–5.5)
PROT SERPL-MCNC: 6.2 G/DL (ref 5.7–8.2)
RBC # BLD AUTO: 3.46 10*6/MM3 (ref 4.2–5.76)
SODIUM BLD-SCNC: 135 MMOL/L (ref 132–146)
WBC NRBC COR # BLD: 12.23 10*3/MM3 (ref 3.5–10.8)

## 2017-03-07 PROCEDURE — C1894 INTRO/SHEATH, NON-LASER: HCPCS

## 2017-03-07 PROCEDURE — 99284 EMERGENCY DEPT VISIT MOD MDM: CPT

## 2017-03-07 PROCEDURE — 85027 COMPLETE CBC AUTOMATED: CPT | Performed by: INTERNAL MEDICINE

## 2017-03-07 PROCEDURE — 83735 ASSAY OF MAGNESIUM: CPT

## 2017-03-07 PROCEDURE — 73560 X-RAY EXAM OF KNEE 1 OR 2: CPT

## 2017-03-07 PROCEDURE — 82962 GLUCOSE BLOOD TEST: CPT

## 2017-03-07 PROCEDURE — 87493 C DIFF AMPLIFIED PROBE: CPT | Performed by: NURSE PRACTITIONER

## 2017-03-07 PROCEDURE — 25010000002 DAPTOMYCIN PER 1 MG: Performed by: INTERNAL MEDICINE

## 2017-03-07 PROCEDURE — 82550 ASSAY OF CK (CPK): CPT | Performed by: INTERNAL MEDICINE

## 2017-03-07 PROCEDURE — 80053 COMPREHEN METABOLIC PANEL: CPT | Performed by: INTERNAL MEDICINE

## 2017-03-07 PROCEDURE — 99239 HOSP IP/OBS DSCHRG MGMT >30: CPT | Performed by: NURSE PRACTITIONER

## 2017-03-07 PROCEDURE — 72100 X-RAY EXAM L-S SPINE 2/3 VWS: CPT

## 2017-03-07 PROCEDURE — 25010000002 PIPERACILLIN-TAZOBACTAM: Performed by: INTERNAL MEDICINE

## 2017-03-07 PROCEDURE — 73700 CT LOWER EXTREMITY W/O DYE: CPT

## 2017-03-07 RX ORDER — POLYETHYLENE GLYCOL 3350 17 G/17G
17 POWDER, FOR SOLUTION ORAL DAILY PRN
Start: 2017-03-07 | End: 2017-04-25

## 2017-03-07 RX ORDER — HYDROCODONE BITARTRATE AND ACETAMINOPHEN 10; 325 MG/1; MG/1
1 TABLET ORAL EVERY 6 HOURS PRN
Refills: 0
Start: 2017-03-07 | End: 2017-03-12

## 2017-03-07 RX ORDER — PSEUDOEPHEDRINE HCL 30 MG
100 TABLET ORAL 2 TIMES DAILY PRN
Refills: 0
Start: 2017-03-07 | End: 2017-04-25

## 2017-03-07 RX ORDER — ACETAMINOPHEN 325 MG/1
650 TABLET ORAL EVERY 4 HOURS PRN
Refills: 0
Start: 2017-03-07 | End: 2019-07-30

## 2017-03-07 RX ORDER — MAGNESIUM SULFATE HEPTAHYDRATE 40 MG/ML
4 INJECTION, SOLUTION INTRAVENOUS ONCE
Status: COMPLETED | OUTPATIENT
Start: 2017-03-07 | End: 2017-03-07

## 2017-03-07 RX ORDER — ZOLPIDEM TARTRATE 5 MG/1
5 TABLET ORAL NIGHTLY PRN
Refills: 0
Start: 2017-03-07 | End: 2017-04-25

## 2017-03-07 RX ORDER — SODIUM CHLORIDE 0.9 % (FLUSH) 0.9 %
10 SYRINGE (ML) INJECTION AS NEEDED
Status: DISCONTINUED | OUTPATIENT
Start: 2017-03-07 | End: 2017-03-07 | Stop reason: HOSPADM

## 2017-03-07 RX ADMIN — HYDROCODONE BITARTRATE AND ACETAMINOPHEN 1 TABLET: 10; 325 TABLET ORAL at 05:17

## 2017-03-07 RX ADMIN — Medication 1 CAPSULE: at 09:33

## 2017-03-07 RX ADMIN — ASPIRIN 81 MG: 81 TABLET, COATED ORAL at 09:33

## 2017-03-07 RX ADMIN — TAZOBACTAM SODIUM AND PIPERACILLIN SODIUM 4.5 G: .5; 4 INJECTION, POWDER, LYOPHILIZED, FOR SOLUTION INTRAVENOUS at 04:59

## 2017-03-07 RX ADMIN — INSULIN LISPRO 2 UNITS: 100 INJECTION, SOLUTION INTRAVENOUS; SUBCUTANEOUS at 12:42

## 2017-03-07 RX ADMIN — INSULIN LISPRO 2 UNITS: 100 INJECTION, SOLUTION INTRAVENOUS; SUBCUTANEOUS at 09:34

## 2017-03-07 RX ADMIN — HYDROCODONE BITARTRATE AND ACETAMINOPHEN 1 TABLET: 10; 325 TABLET ORAL at 14:24

## 2017-03-07 RX ADMIN — MAGNESIUM SULFATE HEPTAHYDRATE 4 G: 40 INJECTION, SOLUTION INTRAVENOUS at 12:39

## 2017-03-07 RX ADMIN — GABAPENTIN 1200 MG: 300 CAPSULE ORAL at 14:24

## 2017-03-07 RX ADMIN — FAMOTIDINE 20 MG: 20 TABLET ORAL at 09:33

## 2017-03-07 RX ADMIN — DOCUSATE SODIUM 100 MG: 100 CAPSULE, LIQUID FILLED ORAL at 09:34

## 2017-03-07 RX ADMIN — ACETAMINOPHEN 650 MG: 325 TABLET, FILM COATED ORAL at 09:32

## 2017-03-07 RX ADMIN — GABAPENTIN 1200 MG: 300 CAPSULE ORAL at 09:33

## 2017-03-07 RX ADMIN — LOSARTAN POTASSIUM 100 MG: 50 TABLET, FILM COATED ORAL at 09:33

## 2017-03-07 RX ADMIN — DAPTOMYCIN 700 MG: 500 INJECTION, POWDER, LYOPHILIZED, FOR SOLUTION INTRAVENOUS at 12:12

## 2017-03-07 NOTE — DISCHARGE PLACEMENT REQUEST
"GLORIA HUDSON, RN    [phone]          Elliot Crystal (46 y.o. Male)     Date of Birth Social Security Number Address Home Phone MRN    1970  [address] [place], [place] [id number] [phone] [id number]    Shinto Marital Status          Oriental orthodox        Admission Date Admission Type Admitting Provider Attending Provider Department, Room/Bed    2/28/17 Emergency Unruly Lopez MD Bansal, Pankaj, MD [address], S449/1    Discharge Date Discharge Disposition Discharge Destination         Rehab Facility or Unit (DC - External)             Attending Provider: Unruly Lopez MD     Allergies:  No Known Drug Allergy    Isolation:  None   Infection:  MRSA (03/03/17)   Code Status:  FULL    Ht:  75\" (190.5 cm)   Wt:  266 lb 5 oz (121 kg)    Admission Cmt:  None   Principal Problem:  Sepsis [A41.9]                 Active Insurance as of 2/28/2017     Primary Coverage     Payor Plan Insurance Group Employer/Plan Group    HUMANA MEDICAID HU[employer] [facility]     Payor Plan Address Payor Plan Phone Number Effective From Effective To    PO  [phone] 1/18/2017     [place] [address]       Subscriber Name Subscriber Birth Date Member ID       ELLIOT CRYSTAL 1970 [id number]                 Emergency Contacts      (Rel.) Home Phone Work Phone Mobile Phone    Cindy Crystal (Spouse) [phone] -- [phone]               Discharge Summary      CHINTAN Clement at 3/7/2017 10:53 AM              [facility] Medicine Services  DISCHARGE SUMMARY       Date of Admission: 2/28/2017  Date of Discharge:  3/7/2017  Primary Care Physician: Juan Rios MD    Discharge Diagnoses:  Active Hospital Problems (** Indicates Principal Problem)    Diagnosis Date Noted   • **Sepsis [A41.9] 02/28/2017   • Osteomyelitis of left foot [M86.9] 02/28/2017   • Diabetic foot infection [E11.69, L08.9] 02/28/2017   • Diabetic neuropathy " [E11.40] 02/28/2017   • Poorly controlled type 2 diabetes mellitus with peripheral neuropathy [E11.42, E11.65] 01/25/2017   • DUNLAP Cirrhosis [K74.60] 01/17/2017   • Essential hypertension [I10] 01/17/2017   • Non-compliance [Z91.14] 01/17/2017   • Hyperlipemia [E78.5] 01/17/2017      Resolved Hospital Problems    Diagnosis Date Noted Date Resolved   No resolved problems to display.       Presenting Problem/History of Present Illness  Diabetic foot infection [E11.69, L08.9]     Chief Complaint on Day of Discharge: feels tired    History of Present Illness on Day of Discharge: laying in bed, feels tired, was up last night with a few bouts of diarrhea, agreeable to [facility] transfer, no soa, chest pain, abdominal pain    Hospital Course  Patient is a 46 y.o. male presented with uncontrolled DM2 and related neuropathy and diabetic left foot ulcers s/p recent 3rd-5th left metatarsal amputations by Dr. Martinez 1/30/17 and was d/c'd at that time to [facility] care and IV abx. He was readmitted from 2/13-2/17 for consideration of further amputation but instead decision was to made to continue agressive anbx. He had since been home and followed for outpt IV abx by Dr. Garcia. He continued to have foot deterioration. He was also non complaint with his outpatient IV anbx.  In 2-3 days prior to admission patient started experiencing fever, malaise and worsening left foot pain with foul-smelling drainage.  He presented to [facility] emergency department for further management.  In the emergency room he was found to have sepsis and he has subsequently been found to have MRSA bacteremia.  Due to worsening deterioration of left foot he subsequently had to undergo a left below-knee amputation by Dr. Martinez on 3/2/17.  Patient tolerated procedure well.  He has been participating in physical therapy. Dr. Dong has been managing his IV anbx. He has been de-escalated to Daptomycin. He also was found to have a heart  murmur this admission but ECHO ruled out endocarditis. Patient also has DUNLAP cirrhosis that is stable. His BP has remained stable on his current regimen. His diabetes has been poorly controlled due to his non compliance however has been under good control in the hospital.     Procedures Performed  Procedure(s):  AMPUTATION BELOW KNEE, SHMUEL       Consults:   Consults     Date and Time Order Name Status Description    2/28/2017 1911 Inpatient Consult to Cardiothoracic Surgery Completed     2/28/2017 1911 Inpatient Consult to Infectious Diseases Completed     2/15/2017 1732 Inpatient Consult to Cardiothoracic Surgery In process     2/13/2017 2111 Inpatient Consult to Infectious Diseases Completed           Pertinent Test Results:  Results for ELLIOT KEENAN (MRN [id number]) as of 3/7/2017 10:47   Ref. Range 3/7/2017 06:02   Glucose Latest Ref Range: 70 - 100 mg/dL 199 (H)   Sodium Latest Ref Range: 132 - 146 mmol/L 135   Potassium Latest Ref Range: 3.5 - 5.5 mmol/L 4.2   CO2 Latest Ref Range: 20.0 - 31.0 mmol/L 30.0   Chloride Latest Ref Range: 99 - 109 mmol/L 102   Anion Gap Latest Ref Range: 3.0 - 11.0 mmol/L 3.0   Creatinine Latest Ref Range: 0.60 - 1.30 mg/dL 0.50 (L)   BUN Latest Ref Range: 9 - 23 mg/dL 13   BUN/Creatinine Ratio Latest Ref Range: 7.0 - 25.0  26.0 (H)   Calcium Latest Ref Range: 8.7 - 10.4 mg/dL 8.5 (L)   eGFR Non African Amer Latest Ref Range: >60 mL/min/1.73 >150   Alkaline Phosphatase Latest Ref Range: 25 - 100 U/L 242 (H)   Total Protein Latest Ref Range: 5.7 - 8.2 g/dL 6.2   ALT (SGPT) Latest Ref Range: 7 - 40 U/L 32   AST (SGOT) Latest Ref Range: 0 - 33 U/L 21   Total Bilirubin Latest Ref Range: 0.3 - 1.2 mg/dL 0.3   Albumin Latest Ref Range: 3.20 - 4.80 g/dL 2.90 (L)   Globulin Latest Units: gm/dL 3.3   A/G Ratio Latest Ref Range: 1.5 - 2.5 g/dL 0.9 (L)   Results for ELLIOT KEENAN (MRN [id number]) as of 3/7/2017 10:47   Ref. Range 3/7/2017 06:02   WBC Latest Ref Range: 3.50 - 10.80  10*3/mm3 12.23 (H)   RBC Latest Ref Range: 4.20 - 5.76 10*6/mm3 3.46 (L)   Hemoglobin Latest Ref Range: 13.1 - 17.5 g/dL 9.2 (L)   Hematocrit Latest Ref Range: 38.9 - 50.9 % 28.8 (L)   RDW Latest Ref Range: 11.3 - 14.5 % 15.1 (H)   MCV Latest Ref Range: 80.0 - 99.0 fL 83.2   MCH Latest Ref Range: 27.0 - 31.0 pg 26.6 (L)   MCHC Latest Ref Range: 32.0 - 36.0 g/dL 31.9 (L)   MPV Latest Ref Range: 6.0 - 12.0 fL 10.9   Platelets Latest Ref Range: 150 - 450 10*3/mm3 283   RDW-SD Latest Ref Range: 37.0 - 54.0 fl 45.6    Cultures:    BLOOD CULTURE   Date Value Ref Range Status   03/03/2017 No growth at 3 days  Preliminary   03/03/2017 No growth at 3 days  Preliminary   02/28/2017 Staphylococcus aureus, MRSA (C)  Corrected     Comment:       Consider infectious disease consult to rule out distant focus of infection.  Methicillin resistant Staphylococcus aureus, Patient may be an isolation risk.  This isolate does not demonstrate inducible clindamycin resistance in vitro.     EXAMINATION: XR FOOT 3+ VW LEFT-       INDICATION: Osteomyelitis.       COMPARISON: Compared to MR datasets 01/25/2017.      FINDINGS:   1. The third, fourth and fifth digits of the left foot have been  resected from the metatarsals distally.  2. There is rarefaction and osteoporosis with cortical mineral loss of  the head of the second metatarsal, however, active focal erosion is not  identified.  3. There is degenerative disease in the midfoot structures without  erosion. Hindfoot structures are unremarkable and the left lower leg and  ankle are unremarkable.       IMPRESSION:  1. Status post amputation third, fourth and fifth digits or raise of the  left foot involving the third, fourth and fifth metatarsals and toes.  2. Active bone erosion, fracture or destructive process is not  identified from a conventional image standpoint currently.      D: 02/28/2017  E: 03/01/2017      This report was finalized on 3/1/2017 10:55 AM by Dr. Shane Villa,  "MD      ECHO:   · Left ventricular function is normal. Estimated EF = 65%.  · Left ventricular wall thickness is consistent with mild concentric hypertrophy.  · All left ventricular wall segments contract normally    Results for KENDRICK CRYSTAL (MRN [id number]) as of 3/7/2017 10:47   Ref. Range 2/28/2017 15:53   Hemoglobin A1C Latest Ref Range: 4.80 - 5.60 % 9.70 (H)       Condition on Discharge: stable    Physical Exam on Discharge:  Visit Vitals   • /94 (BP Location: Right arm, Patient Position: Lying)   • Pulse 96   • Temp 98.1 °F (36.7 °C) (Oral)   • Resp 18   • Ht 75\" (190.5 cm)   • Wt 266 lb 5 oz (121 kg)   • SpO2 95%   • BMI 33.29 kg/m2     Physical Exam   Constitutional: He is oriented to person, place, and time. He appears well-nourished. No distress.   HENT:   Head: Normocephalic and atraumatic.   Eyes: Pupils are equal, round, and reactive to light. No scleral icterus.   Neck: Neck supple. No tracheal deviation present.   Cardiovascular: Normal rate, regular rhythm and normal heart sounds.    Pulmonary/Chest: Effort normal and breath sounds normal.   Abdominal: Soft. Bowel sounds are normal.   Musculoskeletal:   Left BKA   Neurological: He is alert and oriented to person, place, and time.   Skin: Skin is warm and dry.   Psychiatric: He has a normal mood and affect. His behavior is normal.         Discharge Disposition  [facility]    Discharge Medications   Kendrick Crystal   Home Medication Instructions JANET:[id number]    Printed on:03/07/17 1054   Medication Information                      acetaminophen (TYLENOL) 325 MG tablet  Take 2 tablets by mouth Every 4 (Four) Hours As Needed for mild pain (1-3).             aspirin 81 MG EC tablet  Take 1 tablet by mouth Daily.             DAPTOmycin 700 mg in sodium chloride 0.9 % 50 mL  Infuse 700 mg into a venous catheter Daily. Indications: Bacteria in the Blood, Infection of Blood or Tissues affecting the Whole Body           "   docusate sodium 100 MG capsule  Take 100 mg by mouth 2 (Two) Times a Day As Needed for constipation.             famotidine (PEPCID) 20 MG tablet  Take 1 tablet by mouth 2 (Two) Times a Day.             gabapentin (NEURONTIN) 400 MG capsule  Take 3 capsules by mouth 3 (Three) Times a Day.             HYDROcodone-acetaminophen (NORCO)  MG per tablet  Take 1 tablet by mouth Every 6 (Six) Hours As Needed for moderate pain (4-6) for up to 5 days.             insulin detemir (LEVEMIR) 100 UNIT/ML injection  Inject 30 Units under the skin Every Night.             insulin lispro (humaLOG) 100 UNIT/ML injection  Inject 0-9 Units under the skin 4 (Four) Times a Day With Meals & at Bedtime.             losartan (COZAAR) 100 MG tablet  Take 1 tablet by mouth Daily.             polyethylene glycol (MIRALAX) packet  Take 17 g by mouth Daily As Needed (constipation).             probiotic (CULTURELLE) capsule capsule  Take 1 capsule by mouth Daily.             zolpidem (AMBIEN) 5 MG tablet  Take 1 tablet by mouth At Night As Needed for sleep.                 Discharge Diet: cardiac diabetic    Discharge Care Plan / Instructions: continue Daptomycin, Dr. Herbert to follow while he is at [facility], f/u Dr. Martinez in 2 weeks for suture removal    Activity at Discharge: as tolerated    Follow-up Appointments  Future Appointments  Date Time Provider Department Center   3/16/2017 12:30 PM Callie MCDONALD MD [facility] None     Additional Instructions for the Follow-ups that You Need to Schedule     Discharge Follow-up with Specialty    As directed    Specialty:  Dr. Martinez in 2 weeks, remove sutures at that appt                 Test Results Pending at Discharge   Order Current Status    Tissue Exam In process    Blood Culture Preliminary result    Blood Culture Preliminary result           CHINTAN Clement 03/07/17 10:54 AM    Time: 40 minutes    Please note that portions of this note may have been completed with a voice  recognition program. Efforts were made to edit the dictations, but occasionally words are mistranscribed.       Electronically signed by CHINTAN Clement at 3/7/2017 11:19 AM

## 2017-03-07 NOTE — PAYOR COMM NOTE
"Kendrick Crystal (46 y.o. Male) Notification of discharge today on 03/07/17    Date of Birth Social Security Number Address Home Phone MRN    1970  [address] [place] [id number] [phone] [id number]    Buddhism Marital Status          Yarsani        Admission Date Admission Type Admitting Provider Attending Provider Department, Room/Bed    2/28/17 Emergency Unruly Lopez MD  [address], S[address]/1    Discharge Date Discharge Disposition Discharge Destination        3/7/2017 Rehab Facility or Unit (DC - External)             Attending Provider: (none)    Allergies:  No Known Drug Allergy    Isolation:  None   Infection:  MRSA (03/03/17)   Code Status:  FULL    Ht:  75\" (190.5 cm)   Wt:  266 lb 5 oz (121 kg)    Admission Cmt:  None   Principal Problem:  Sepsis [A41.9]                 Active Insurance as of 2/28/2017     Primary Coverage     Payor Plan Insurance Group Employer/Plan Group    HUMANA MEDICAID HU[employer]NA [employer] [facility]     Payor Plan Address Payor Plan Phone Number Effective From Effective To    PO  [phone] 1/18/2017     [place] [address]       Subscriber Name Subscriber Birth Date Member ID       KENDRICK CRYSTAL 1970 [id number]                 Emergency Contacts      (Rel.) Home Phone Work Phone Mobile Phone    Cindy Crystal (Spouse) [phone] -- [phone]            Discharge Order     Start     Ordered    03/07/17 1052  Discharge patient  Once     Expected Discharge Date:  03/07/17    Discharge Disposition:  Rehab Facility or Unit (DC - External)        03/07/17 1053          "

## 2017-03-07 NOTE — PROGRESS NOTES
Continued Stay Note  [facility]     Patient Name: Kendrick Crystal  MRN: [id number]  Today's Date: 3/7/2017    Admit Date: 2/28/2017          Discharge Plan       03/07/17 0914    Case Management/Social Work Plan    Plan [facility]    Patient/Family In Agreement With Plan yes    Additional Comments Spoke with Renee at [facility].  Bed avaialbe today on [facility] if medically ready.  RN to call report to [phone] and fax DC summary to [phone].  Wife to transport.  CM will cont to follow.              Discharge Codes     None        Expected Discharge Date and Time     Expected Discharge Date Expected Discharge Time    Mar 8, 2017             Christelle Cuevas

## 2017-03-07 NOTE — DISCHARGE SUMMARY
[facility] Medicine Services  DISCHARGE SUMMARY       Date of Admission: 2/28/2017  Date of Discharge:  3/7/2017  Primary Care Physician: Juan Rios MD    Discharge Diagnoses:  Active Hospital Problems (** Indicates Principal Problem)    Diagnosis Date Noted   • **Sepsis [A41.9] 02/28/2017   • Osteomyelitis of left foot [M86.9] 02/28/2017   • Diabetic foot infection [E11.69, L08.9] 02/28/2017   • Diabetic neuropathy [E11.40] 02/28/2017   • Poorly controlled type 2 diabetes mellitus with peripheral neuropathy [E11.42, E11.65] 01/25/2017   • DUNLAP Cirrhosis [K74.60] 01/17/2017   • Essential hypertension [I10] 01/17/2017   • Non-compliance [Z91.14] 01/17/2017   • Hyperlipemia [E78.5] 01/17/2017      Resolved Hospital Problems    Diagnosis Date Noted Date Resolved   No resolved problems to display.       Presenting Problem/History of Present Illness  Diabetic foot infection [E11.69, L08.9]     Chief Complaint on Day of Discharge: feels tired    History of Present Illness on Day of Discharge: laying in bed, feels tired, was up last night with a few bouts of diarrhea, agreeable to [facility] transfer, no soa, chest pain, abdominal pain    Hospital Course  Patient is a 46 y.o. male presented with uncontrolled DM2 and related neuropathy and diabetic left foot ulcers s/p recent 3rd-5th left metatarsal amputations by Dr. Martinez 1/30/17 and was d/c'd at that time to [facility] care and IV abx. He was readmitted from 2/13-2/17 for consideration of further amputation but instead decision was to made to continue agressive anbx. He had since been home and followed for outpt IV abx by Dr. Garcia. He continued to have foot deterioration. He was also non complaint with his outpatient IV anbx.  In 2-3 days prior to admission patient started experiencing fever, malaise and worsening left foot pain with foul-smelling drainage.  He presented to [facility] emergency department for further  management.  In the emergency room he was found to have sepsis and he has subsequently been found to have MRSA bacteremia.  Due to worsening deterioration of left foot he subsequently had to undergo a left below-knee amputation by Dr. Martinez on 3/2/17.  Patient tolerated procedure well.  He has been participating in physical therapy. Dr. Dong has been managing his IV anbx. He has been de-escalated to Daptomycin. He also was found to have a heart murmur this admission but ECHO ruled out endocarditis. Patient also has DUNLAP cirrhosis that is stable. His BP has remained stable on his current regimen. His diabetes has been poorly controlled due to his non compliance however has been under good control in the hospital.     Procedures Performed  Procedure(s):  AMPUTATION BELOW KNEE, SHMUEL       Consults:   Consults     Date and Time Order Name Status Description    2/28/2017 1911 Inpatient Consult to Cardiothoracic Surgery Completed     2/28/2017 1911 Inpatient Consult to Infectious Diseases Completed     2/15/2017 1732 Inpatient Consult to Cardiothoracic Surgery In process     2/13/2017 2111 Inpatient Consult to Infectious Diseases Completed           Pertinent Test Results:  Results for ELLIOT KEENAN (MRN [id number]) as of 3/7/2017 10:47   Ref. Range 3/7/2017 06:02   Glucose Latest Ref Range: 70 - 100 mg/dL 199 (H)   Sodium Latest Ref Range: 132 - 146 mmol/L 135   Potassium Latest Ref Range: 3.5 - 5.5 mmol/L 4.2   CO2 Latest Ref Range: 20.0 - 31.0 mmol/L 30.0   Chloride Latest Ref Range: 99 - 109 mmol/L 102   Anion Gap Latest Ref Range: 3.0 - 11.0 mmol/L 3.0   Creatinine Latest Ref Range: 0.60 - 1.30 mg/dL 0.50 (L)   BUN Latest Ref Range: 9 - 23 mg/dL 13   BUN/Creatinine Ratio Latest Ref Range: 7.0 - 25.0  26.0 (H)   Calcium Latest Ref Range: 8.7 - 10.4 mg/dL 8.5 (L)   eGFR Non African Amer Latest Ref Range: >60 mL/min/1.73 >150   Alkaline Phosphatase Latest Ref Range: 25 - 100 U/L 242 (H)   Total Protein Latest Ref  Range: 5.7 - 8.2 g/dL 6.2   ALT (SGPT) Latest Ref Range: 7 - 40 U/L 32   AST (SGOT) Latest Ref Range: 0 - 33 U/L 21   Total Bilirubin Latest Ref Range: 0.3 - 1.2 mg/dL 0.3   Albumin Latest Ref Range: 3.20 - 4.80 g/dL 2.90 (L)   Globulin Latest Units: gm/dL 3.3   A/G Ratio Latest Ref Range: 1.5 - 2.5 g/dL 0.9 (L)   Results for ELLIOT KEENAN (MRN [id number]) as of 3/7/2017 10:47   Ref. Range 3/7/2017 06:02   WBC Latest Ref Range: 3.50 - 10.80 10*3/mm3 12.23 (H)   RBC Latest Ref Range: 4.20 - 5.76 10*6/mm3 3.46 (L)   Hemoglobin Latest Ref Range: 13.1 - 17.5 g/dL 9.2 (L)   Hematocrit Latest Ref Range: 38.9 - 50.9 % 28.8 (L)   RDW Latest Ref Range: 11.3 - 14.5 % 15.1 (H)   MCV Latest Ref Range: 80.0 - 99.0 fL 83.2   MCH Latest Ref Range: 27.0 - 31.0 pg 26.6 (L)   MCHC Latest Ref Range: 32.0 - 36.0 g/dL 31.9 (L)   MPV Latest Ref Range: 6.0 - 12.0 fL 10.9   Platelets Latest Ref Range: 150 - 450 10*3/mm3 283   RDW-SD Latest Ref Range: 37.0 - 54.0 fl 45.6    Cultures:    BLOOD CULTURE   Date Value Ref Range Status   03/03/2017 No growth at 3 days  Preliminary   03/03/2017 No growth at 3 days  Preliminary   02/28/2017 Staphylococcus aureus, MRSA (C)  Corrected     Comment:       Consider infectious disease consult to rule out distant focus of infection.  Methicillin resistant Staphylococcus aureus, Patient may be an isolation risk.  This isolate does not demonstrate inducible clindamycin resistance in vitro.     EXAMINATION: XR FOOT 3+ VW LEFT-       INDICATION: Osteomyelitis.       COMPARISON: Compared to MR datasets 01/25/2017.      FINDINGS:   1. The third, fourth and fifth digits of the left foot have been  resected from the metatarsals distally.  2. There is rarefaction and osteoporosis with cortical mineral loss of  the head of the second metatarsal, however, active focal erosion is not  identified.  3. There is degenerative disease in the midfoot structures without  erosion. Hindfoot structures are unremarkable and  "the left lower leg and  ankle are unremarkable.       IMPRESSION:  1. Status post amputation third, fourth and fifth digits or raise of the  left foot involving the third, fourth and fifth metatarsals and toes.  2. Active bone erosion, fracture or destructive process is not  identified from a conventional image standpoint currently.      D: 02/28/2017  E: 03/01/2017      This report was finalized on 3/1/2017 10:55 AM by Dr. Shane Villa MD      ECHO:   · Left ventricular function is normal. Estimated EF = 65%.  · Left ventricular wall thickness is consistent with mild concentric hypertrophy.  · All left ventricular wall segments contract normally    Results for ELLIOT KEENAN (MRN [id number]) as of 3/7/2017 10:47   Ref. Range 2/28/2017 15:53   Hemoglobin A1C Latest Ref Range: 4.80 - 5.60 % 9.70 (H)       Condition on Discharge: stable    Physical Exam on Discharge:  Visit Vitals   • /94 (BP Location: Right arm, Patient Position: Lying)   • Pulse 96   • Temp 98.1 °F (36.7 °C) (Oral)   • Resp 18   • Ht 75\" (190.5 cm)   • Wt 266 lb 5 oz (121 kg)   • SpO2 95%   • BMI 33.29 kg/m2     Physical Exam   Constitutional: He is oriented to person, place, and time. He appears well-nourished. No distress.   HENT:   Head: Normocephalic and atraumatic.   Eyes: Pupils are equal, round, and reactive to light. No scleral icterus.   Neck: Neck supple. No tracheal deviation present.   Cardiovascular: Normal rate, regular rhythm and normal heart sounds.    Pulmonary/Chest: Effort normal and breath sounds normal.   Abdominal: Soft. Bowel sounds are normal.   Musculoskeletal:   Left BKA   Neurological: He is alert and oriented to person, place, and time.   Skin: Skin is warm and dry.   Psychiatric: He has a normal mood and affect. His behavior is normal.         Discharge Disposition  [facility]    Discharge Medications   Elliot Keenan A   Home Medication Instructions JANET:[id number]    Printed on:03/07/17 1054 "   Medication Information                      acetaminophen (TYLENOL) 325 MG tablet  Take 2 tablets by mouth Every 4 (Four) Hours As Needed for mild pain (1-3).             aspirin 81 MG EC tablet  Take 1 tablet by mouth Daily.             DAPTOmycin 700 mg in sodium chloride 0.9 % 50 mL  Infuse 700 mg into a venous catheter Daily. Indications: Bacteria in the Blood, Infection of Blood or Tissues affecting the Whole Body             docusate sodium 100 MG capsule  Take 100 mg by mouth 2 (Two) Times a Day As Needed for constipation.             famotidine (PEPCID) 20 MG tablet  Take 1 tablet by mouth 2 (Two) Times a Day.             gabapentin (NEURONTIN) 400 MG capsule  Take 3 capsules by mouth 3 (Three) Times a Day.             HYDROcodone-acetaminophen (NORCO)  MG per tablet  Take 1 tablet by mouth Every 6 (Six) Hours As Needed for moderate pain (4-6) for up to 5 days.             insulin detemir (LEVEMIR) 100 UNIT/ML injection  Inject 30 Units under the skin Every Night.             insulin lispro (humaLOG) 100 UNIT/ML injection  Inject 0-9 Units under the skin 4 (Four) Times a Day With Meals & at Bedtime.             losartan (COZAAR) 100 MG tablet  Take 1 tablet by mouth Daily.             polyethylene glycol (MIRALAX) packet  Take 17 g by mouth Daily As Needed (constipation).             probiotic (CULTURELLE) capsule capsule  Take 1 capsule by mouth Daily.             zolpidem (AMBIEN) 5 MG tablet  Take 1 tablet by mouth At Night As Needed for sleep.                 Discharge Diet: cardiac diabetic    Discharge Care Plan / Instructions: continue Antonio, Dr. Herbert to follow while he is at [facility], f/u Dr. Martinez in 2 weeks for suture removal    Activity at Discharge: as tolerated    Follow-up Appointments  Future Appointments  Date Time Provider Department Center   3/16/2017 12:30 PM Callie MCDONALD MD [facility] None     Additional Instructions for the Follow-ups that You Need to Schedule      Discharge Follow-up with Specialty    As directed    Specialty:  Dr. Martinez in 2 weeks, remove sutures at that appt                 Test Results Pending at Discharge   Order Current Status    Tissue Exam In process    Blood Culture Preliminary result    Blood Culture Preliminary result           CHINTAN Clement 03/07/17 10:54 AM    Time: 40 minutes    Please note that portions of this note may have been completed with a voice recognition program. Efforts were made to edit the dictations, but occasionally words are mistranscribed.

## 2017-03-07 NOTE — PROGRESS NOTES
CTS Progress Note      POD 5 s/p L BKA       LOS: 7 days     Subjective  No acute events overnight, no complaints.   Unable to place PICC yesterday.     Objective    Vital Signs  Temp:  [97.9 °F (36.6 °C)-98.2 °F (36.8 °C)] 98.1 °F (36.7 °C)  Heart Rate:  [89-98] 96  Resp:  [16-20] 18  BP: (151-165)/(93-98) 160/94    Physical Exam:   General Appearance: alert, appears stated age and cooperative   Lungs: clear to auscultation    Heart: regular rhythm & normal rate, normal S1, S2 and no murmur, no clau, no rub   Skin: Incision c/d/i     Results     Results from last 7 days  Lab Units 03/06/17  0653   WBC 10*3/mm3 11.57*   HEMOGLOBIN g/dL 9.2*   HEMATOCRIT % 29.1*   PLATELETS 10*3/mm3 258       Results from last 7 days  Lab Units 03/06/17  0653   SODIUM mmol/L 137   POTASSIUM mmol/L 3.8   CHLORIDE mmol/L 105   TOTAL CO2 mmol/L 28.0   BUN mg/dL 13   CREATININE mg/dL 0.50*   GLUCOSE mg/dL 155*   CALCIUM mg/dL 7.7*       Imaging Results (last 24 hours)     ** No results found for the last 24 hours. **          Assessment  POD 5 s/p L BKA  Noncompliance  Poorly controlled DM II  Cirrhosis  PVD    Plan   Attempt PICC again  Continue IV abx  Transfer to rehab  Will remove sutures when patient returns to clinic in 2 weeks

## 2017-03-07 NOTE — PROGRESS NOTES
[facility] Progress Note    2/28/2017      Antibiotics:  IV Anti-Infectives     Ordered     Dose/Rate Route Frequency Start Stop    03/07/17 1053  DAPTOmycin 700 mg in sodium chloride 0.9 % 50 mL     Ordering Provider:  Unruly Lopez MD    700 mg  100 mL/hr over 30 Minutes Intravenous Every 24 Hours 03/07/17 0000      03/03/17 1055  DAPTOmycin (CUBICIN) 700 mg in sodium chloride 0.9 % 50 mL IVPB     Ordering Provider:  Usman Dong MD    700 mg  100 mL/hr over 30 Minutes Intravenous Every 24 Hours 03/03/17 1200      03/01/17 0848  vancomycin 2000 mg/500 mL 0.9% NS IVPB ([facility])     Ordering Provider:  Juancho Kathleen RP    2,000 mg  over 120 Minutes Intravenous Once 03/01/17 0930 03/01/17 1115    02/28/17 1753  DAPTOmycin (CUBICIN) 450 mg in sodium chloride 0.9 % 50 mL IVPB     Benito Allison [facility] reviewed the order on 02/28/17 1936.   Ordering Provider:  Usman Dong MD    450 mg  100 mL/hr over 30 Minutes Intravenous Every 24 Hours 02/28/17 1800 02/28/17 2040    02/28/17 1753  cefTRIAXone (ROCEPHIN) IVPB 1 g     Ordering Provider:  Carlos Brothers MD    1 g  over 30 Minutes Intravenous Once 02/28/17 1755 02/28/17 1842          CC:sepsis, bacteremia, foot osteo, noncompliance    HPI:  Patient is a 46 y.o. Yr old male with history of uncontrolled diabetes with extensive comorbidity as previously outlined. He has lower extremity vascular disease but no specific option for revascularization per discussion with Dr. Martinez. He was admitted January 2017 with left foot infection, MRI not confirming osteomyelitis, but had surgery on January 30 with metatarsals 3/4/5 amputated, marginal perfusion per operative note and MRSA/GB strep/coag-negative staph in his various cultures. He was transferred to [facility] on broad-spectrum IV antibiotics. While at [facility], he was under the care of Dr. Herrera and, per family/patient, he was told that he required hyperbaric oxygen therapy and had very little chance for  "long-term healing at the foot. He was readmitted on February 13 to [facility] with patient initially interested in having higher level amputation. After discussion with Dr. Martinez, he changed his mind and wanted attempt at salvage again with IV antibiotics/wound care. He was discharged February 17 as outlined in our prior inpatient notes. He was noncompliant with follow-up in the office the week of February 20 despite our calls to the patient. He then stopped antibiotics on his own Wednesday, February 22 and did not notify us. His family was able to get him to come into the office on February 27 at which point he refused IV antibiotics/insisted PICC line be removed and he refused hospitalization/VAC,  understanding the risks of his behavior/decisions as outlined in my office note. He was agreeable for prescription of oral agents and went home to consider his options. On February 28, he called and said he was willing to come to the hospital. He presented to the emergency room, found to have fever/leukocytosis consistent with sepsis and subsequently found to be bacteremic with MRSA.      3/7/17 no fever, no new focal pain and generally stable respiratory status with stable hemodynamics.  Denies any new extremity pain.  No chest pain or palpitation.  No headache photophobia or neck stiffness.  No shortness of breath or cough    ROS:      3/7/17No n/v/d. No rash. No new ADR to Abx.   no focal weakness or description of myalgia.  Otherwise complete review of systems unable to obtain    All Other systems negative for acute complaints on a 12 system review of systems          PE:   Visit Vitals   • /94 (BP Location: Right arm, Patient Position: Lying)   • Pulse 96   • Temp 98.1 °F (36.7 °C) (Oral)   • Resp 18   • Ht 75\" (190.5 cm)   • Wt 266 lb 5 oz (121 kg)   • SpO2 95%   • BMI 33.29 kg/m2       GENERAL: Sleepy, in no acute distress.   HEENT: Normocephalic, atraumatic.  PERRL. EOMI. No conjunctival injection. No " icterus. Oropharynx clear without evidence of thrush or exudate. No evidence of peridontal disease.    NECK: Supple without nuchal rigidity. No mass.  LYMPH: No cervical, axillary or inguinal lymphadenopathy.  HEART: RRR; No murmur, rubs, gallops.   LUNGS: Clear to auscultation bilaterally without wheezing, rales, rhonchi. Normal respiratory effort. Nonlabored. No dullness.  ABDOMEN: Soft, nontender, nondistended. Positive bowel sounds. No rebound or guarding. NO mass or HSM.  EXT:  No cyanosis, clubbing or edema. No cord.  : Genitalia generally unremarkable.  Without Walker catheter.  MSK: FROM without joint effusions noted arms/legs.    SKIN: Warm and dry without cutaneous eruptions on Inspection/palpation aside from below.    NEURO: Oriented to PPT. No focal deficits on motor/sensory exam at arms/legs.    Left BKA noted; no new purulence or redness     No peripheral stigmata/phenomena of endocarditis    Laboratory Data      Results from last 7 days  Lab Units 03/07/17  0602 03/06/17  0653 03/05/17  0715   WBC 10*3/mm3 12.23* 11.57* 10.18   HEMOGLOBIN g/dL 9.2* 9.2* 9.6*   HEMATOCRIT % 28.8* 29.1* 29.5*   PLATELETS 10*3/mm3 283 258 252       Results from last 7 days  Lab Units 03/07/17  0602   SODIUM mmol/L 135   POTASSIUM mmol/L 4.2   CHLORIDE mmol/L 102   TOTAL CO2 mmol/L 30.0   BUN mg/dL 13   CREATININE mg/dL 0.50*   GLUCOSE mg/dL 199*   CALCIUM mg/dL 8.5*       Results from last 7 days  Lab Units 03/07/17  0602   ALK PHOS U/L 242*   BILIRUBIN mg/dL 0.3   ALT (SGPT) U/L 32   AST (SGOT) U/L 21       Results from last 7 days  Lab Units 02/28/17  1553   SED RATE mm/hr 127*       Results from last 7 days  Lab Units 02/28/17  1553   CRP mg/dL 135.10*       Estimated Creatinine Clearance: 253.3 mL/min (by C-G formula based on Cr of 0.5).      Microbiology:      Radiology:  Imaging Results (last 72 hours)     Procedure Component Value Units Date/Time    XR Foot 3+ View Left [[id number]] Collected:  03/01/17 1036      Updated:  03/01/17 1057    Narrative:       EXAMINATION: XR FOOT 3+ VW LEFT-      INDICATION: Osteomyelitis.      COMPARISON: Compared to MR datasets 01/25/2017.     FINDINGS:   1. The third, fourth and fifth digits of the left foot have been  resected from the metatarsals distally.  2. There is rarefaction and osteoporosis with cortical mineral loss of  the head of the second metatarsal, however, active focal erosion is not  identified.  3. There is degenerative disease in the midfoot structures without  erosion. Hindfoot structures are unremarkable and the left lower leg and  ankle are unremarkable.           Impression:       1. Status post amputation third, fourth and fifth digits or raise of the  left foot involving the third, fourth and fifth metatarsals and toes.  2. Active bone erosion, fracture or destructive process is not  identified from a conventional image standpoint currently.     D:  02/28/2017  E:  03/01/2017     This report was finalized on 3/1/2017 10:55 AM by Dr. Shane Villa MD.               Impression:   --MRSA bacteremia/sepsis. Likely associated with his left foot osteomyelitis/infection, with general progression of illness likely as a direct result of his medical noncompliance and recent refusal of care. He and his family understand the implication of his recent behavior. Dapto EYAD = 1 and vanco eyad=2 per micro;  Changed to dapto.  Repeat cultures negative so far;  No new suppurative sequelae     --Left foot gangrene/osteomyelitis with progression as above. BKA done;  Surgical site ok     --Medical noncompliance as above     --Diabetes type 2. You need to tightly control blood sugar to give best chance for healing     --Peripheral vascular disease as previously outlined     --Heart murmur by exam associated with the MRSA bacteremia. SHMUEL neg for vegetation per Dr Martinez.  TTE no vegetation described by cardio    --Abnormal CPK.  Trended down.  Possibly related to surgery. Monitor.   Asymptomatic per nursing with no description of myalgia/focal ext weakness    PLAN:    --IV daptomycin     --I discussed potential risks and benefits of the prescribed antibiotics that include, but are not limited to, solid organ toxicity, neuro/bala/vestibular toxicity, renal toxicity, CDiff, cytopenias, hypersensitivity, pulm/muscle tox etc.. Patient/Family voice understanding and agree to proceed.     --Monitor IV and IV antibiotics with risk for systemic complication and potential drug interaction     --Check/review labs cultures and scans     --Highly complex set of issues with high risk for further serious morbidity and other serious sequela     --Discussed with patient's family, Dr. Martinez, and microbiology.     --d/w Dr Martinez; Dr. Martinez reports to me directly SHMUEL neg for vegetation    --surgery 3/2    --d/w Dr Lopez ;  Possible [facility] soon    Usman Dong MD  3/7/2017

## 2017-03-08 VITALS
SYSTOLIC BLOOD PRESSURE: 171 MMHG | RESPIRATION RATE: 18 BRPM | TEMPERATURE: 98.5 F | WEIGHT: 250 LBS | HEART RATE: 96 BPM | HEIGHT: 75 IN | BODY MASS INDEX: 31.08 KG/M2 | OXYGEN SATURATION: 96 % | DIASTOLIC BLOOD PRESSURE: 110 MMHG

## 2017-03-08 LAB
BACTERIA SPEC AEROBE CULT: NORMAL
BACTERIA SPEC AEROBE CULT: NORMAL

## 2017-03-08 NOTE — THERAPY DISCHARGE NOTE
Acute Care - Occupational Therapy Discharge Summary  [facility]     Patient Name: Kendrick Crystal  : 1970  MRN: [id number]    Today's Date: 3/8/2017  Onset of Illness/Injury or Date of Surgery Date: 17 (L BKA)    Date of Referral to OT: 17  Referring Physician: Enoc PT      Admit Date: 2017        OT Recommendation and Plan    Visit Dx:    ICD-10-CM ICD-9-CM   1. Diabetic foot infection E11.69 250.80    L08.9 686.9   2. SIRS (systemic inflammatory response syndrome) R65.10 995.90   3. Osteomyelitis of left foot, unspecified chronicity M86.9 730.27   4. Impaired mobility and ADLs Z74.09 799.89   5. Impaired functional mobility, balance, gait, and endurance Z74.09 V49.89                     OT Goals       17 1119 17 1159       Bed Mobility OT LTG    Bed Mobility OT LTG, Date Established  17  -AN     Bed Mobility OT LTG, Time to Achieve  1 wk  -AN     Bed Mobility OT LTG, Activity Type  supine to sit/sit to supine  -AN     Bed Mobility OT LTG, [place] Level  minimum assist (75% patient effort)  -AN     Bed Mobility OT LTG, Outcome goal ongoing  -DANDRE (r) HK (t) DANDRE (c)      Transfer Training OT LTG    Transfer Training OT LTG, Date Established  17  -AN     Transfer Training OT LTG, Time to Achieve  1 wk  -AN     Transfer Training OT LTG, Activity Type  bed to chair /chair to bed  -AN     Transfer Training OT LTG, [place] Level  minimum assist (75% patient effort);2 person assist required  -AN     Transfer Training OT LTG, Assist Device  walker, rolling  -AN     Transfer Training OT LTG, Outcome goal ongoing  -DANDRE (r) HK (t) DANDRE (c)      Bathing OT LTG    Bathing Goal OT LTG, Date Established  17  -AN     Bathing Goal OT LTG, Time to Achieve  1 wk  -AN     Bathing Goal OT LTG, Activity Type  simulates;lower body bathing  -AN     Bathing Goal OT LTG, [place] Level  minimum assist (75% patient effort)  -AN     Bathing Goal OT LTG, Assist Device  sponge,  long handled  -AN     Bathing Goal OT LTG, Outcome goal met  -DANDRE (r) HK (t) DANDRE (c)      LB Dressing OT LTG    LB Dressing Goal OT LTG, Date Established  03/04/17  -AN     LB Dressing Goal OT LTG, Time to Achieve  1 wk  -AN     LB Dressing Goal OT LTG, [place] Level  minimum assist (75% patient effort)  -AN     LB Dressing Goal OT LTG, Adaptive Equipment  reacher;shoe horn, long handled;sock-aid  -AN     LB Dressing Goal OT LTG, Outcome goal ongoing   Issued and educated on AE  -DANDRE (r) HK (t) DANDRE (c)        User Key  (r) = Recorded By, (t) = Taken By, (c) = Cosigned By    Initials Name Provider Type    DANDRE Pride, OT Occupational Therapist    MARIA DOLORES Anguiano, OT Occupational Therapist    DAVID Maher, OT Student OT Student                Outcome Measures       03/06/17 1052 03/06/17 1030       How much help from another person do you currently need...    Turning from your back to your side while in flat bed without using bedrails?  3  -UD     Moving from lying on back to sitting on the side of a flat bed without bedrails?  2  -UD     Moving to and from a bed to a chair (including a wheelchair)?  2  -UD     Standing up from a chair using your arms (e.g., wheelchair, bedside chair)?  2  -UD     Climbing 3-5 steps with a railing?  1  -UD     To walk in hospital room?  1  -UD     AM-PAC 6 Clicks Score  11  -UD     How much help from another is currently needed...    Putting on and taking off regular lower body clothing? 3  -DANDRE (r) HK (t) DANDRE (c)      Bathing (including washing, rinsing, and drying) 3  -DANDRE (r) HK (t) DANDRE (c)      Toileting (which includes using toilet bed pan or urinal) 2  -DANDRE (r) HK (t) DANDER (c)      Putting on and taking off regular upper body clothing 2  -DANDRE (r) HK (t) DANDRE (c)      Taking care of personal grooming (such as brushing teeth) 3  -DANDRE (r) HK (t) DANDRE (c)      Eating meals 4  -DANDRE (r) HK (t) DANDRE (c)      Score 17  -DANDRE (r) HK (t)      Functional Assessment    Outcome Measure Options  AM-PAC 6 Clicks Daily Activity (OT)  -DANDRE (r) HK (t) DANDRE (c)        User Key  (r) = Recorded By, (t) = Taken By, (c) = Cosigned By    Initials Name Provider Type    DANDRE Pride, OT Occupational Therapist    HUNTER Valadez, PTA Physical Therapy Assistant    DAVID Maher, OT Student OT Student              OT Discharge Summary  Reason for Discharge: Discharge from facility  Outcomes Achieved: Refer to plan of care for updates on goals achieved  Discharge Destination: Inpatient rehabilitation facility      Sima Arnold OT  3/8/2017

## 2017-03-14 ENCOUNTER — OFFICE VISIT (OUTPATIENT)
Dept: CARDIAC SURGERY | Facility: CLINIC | Age: 47
End: 2017-03-14

## 2017-03-14 VITALS
WEIGHT: 250 LBS | OXYGEN SATURATION: 98 % | TEMPERATURE: 97.2 F | HEART RATE: 85 BPM | SYSTOLIC BLOOD PRESSURE: 148 MMHG | HEIGHT: 75 IN | BODY MASS INDEX: 31.08 KG/M2 | DIASTOLIC BLOOD PRESSURE: 100 MMHG

## 2017-03-14 DIAGNOSIS — M86.9 OSTEOMYELITIS OF LEFT FOOT, UNSPECIFIED CHRONICITY: Primary | ICD-10-CM

## 2017-03-14 PROCEDURE — 99024 POSTOP FOLLOW-UP VISIT: CPT | Performed by: THORACIC SURGERY (CARDIOTHORACIC VASCULAR SURGERY)

## 2017-03-18 ENCOUNTER — RESULTS ENCOUNTER (OUTPATIENT)
Dept: ENDOCRINOLOGY | Facility: CLINIC | Age: 47
End: 2017-03-18

## 2017-03-18 DIAGNOSIS — E11.00 TYPE 2 DIABETES MELLITUS WITH HYPEROSMOLARITY WITHOUT NONKETOTIC HYPERGLYCEMIC-HYPEROSMOLAR COMA (NKHHC) (HCC): ICD-10-CM

## 2017-04-05 ENCOUNTER — OFFICE VISIT (OUTPATIENT)
Dept: CARDIAC SURGERY | Facility: CLINIC | Age: 47
End: 2017-04-05

## 2017-04-05 VITALS
WEIGHT: 244 LBS | HEART RATE: 101 BPM | HEIGHT: 75 IN | DIASTOLIC BLOOD PRESSURE: 110 MMHG | OXYGEN SATURATION: 98 % | TEMPERATURE: 98.1 F | SYSTOLIC BLOOD PRESSURE: 160 MMHG | BODY MASS INDEX: 30.34 KG/M2

## 2017-04-05 DIAGNOSIS — M86.9 OSTEOMYELITIS OF LEFT FOOT, UNSPECIFIED CHRONICITY: Primary | ICD-10-CM

## 2017-04-05 PROCEDURE — 99024 POSTOP FOLLOW-UP VISIT: CPT | Performed by: PHYSICIAN ASSISTANT

## 2017-04-05 NOTE — PROGRESS NOTES
"04/05/2017  Patient Information  Kendrick Crystal                                                                                          [address] [place] [id number]   1970  'PCP/Referring Physician'  Juan Rios MD  [phone]  No ref. provider found    Chief Complaint   Patient presents with   • Follow-up     FU for Suture Removal of Left AKA 3/2/17       History of Present Illness:  Patient presents to office with family today for suture removal. The patient had a left AKA on 3/2/17 and was discharged from [facility] two weeks ago. He states that he has benefited from his time there and the staff were monumental in his recovery. He denies any incisional or \"phantom\" pain. He will require a referral to an Orthopedist for stump .     Patient Active Problem List   Diagnosis   • DUNLAP Cirrhosis   • Essential hypertension   • Non-compliance   • Type 2 diabetes mellitus with hyperosmolarity without nonketotic hyperglycemic-hyperosmolar coma (nkhhc)   • Arthritis   • Hyperlipemia   • Hypertriglyceridemia, essential   • Diabetic foot ulcer associated with diabetes mellitus due to underlying condition   • Cellulitis of left foot   • Poorly controlled type 2 diabetes mellitus with peripheral neuropathy   • DUNLAP (nonalcoholic steatohepatitis)   • Hyponatremia   • Neutrophilic leukocytosis   • Hypomagnesemia   • Encephalopathy, hepatic   • Hypertension   • Osteomyelitis of left foot   • Sepsis   • Diabetic foot infection   • Diabetic neuropathy     Past Medical History:   Diagnosis Date   • Arthritis    • Cirrhosis    • Counseling for insulin pump    • Diabetes mellitus    • Encephalopathy, hepatic    • H/O degenerative disc disease    • History of Lissa's gangrene    • Hyperlipemia    • Hypertension    • multiple Skin abscesses    • DUNLAP, bx showed stage IV fibrosis    • Neuropathy    • Peripheral vascular disease      Past Surgical History:   Procedure Laterality Date   • AMPUTATION DIGIT " Left 1/30/2017    Procedure: left fourth and fifth transmetatarsal toe amputation ;  Surgeon: Juancho Martinez MD;  Location:  [facility];  Service:    • BELOW KNEE AMPUTATION Left 3/2/2017    Procedure: AMPUTATION BELOW KNEE, SHMUEL;  Surgeon: Juancho Martinez MD;  Location:  [facility];  Service:    • LEG SURGERY     • TESTICLE SURGERY      DEBRIDEMENT FROM GANGRENE       Current Outpatient Prescriptions:   •  acetaminophen (TYLENOL) 325 MG tablet, Take 2 tablets by mouth Every 4 (Four) Hours As Needed for mild pain (1-3)., Disp: , Rfl: 0  •  aspirin 81 MG EC tablet, Take 1 tablet by mouth Daily., Disp: , Rfl:   •  docusate sodium 100 MG capsule, Take 100 mg by mouth 2 (Two) Times a Day As Needed for constipation., Disp: , Rfl: 0  •  famotidine (PEPCID) 20 MG tablet, Take 1 tablet by mouth 2 (Two) Times a Day., Disp: , Rfl:   •  gabapentin (NEURONTIN) 400 MG capsule, Take 3 capsules by mouth 3 (Three) Times a Day., Disp: , Rfl:   •  insulin detemir (LEVEMIR) 100 UNIT/ML injection, Inject 30 Units under the skin Every Night., Disp: , Rfl: 12  •  insulin lispro (humaLOG) 100 UNIT/ML injection, Inject 0-9 Units under the skin 4 (Four) Times a Day With Meals & at Bedtime., Disp: , Rfl: 12  •  losartan (COZAAR) 100 MG tablet, Take 1 tablet by mouth Daily., Disp: 30 tablet, Rfl: 0  •  polyethylene glycol (MIRALAX) packet, Take 17 g by mouth Daily As Needed (constipation)., Disp: , Rfl:   •  probiotic (CULTURELLE) capsule capsule, Take 1 capsule by mouth Daily., Disp: 30 capsule, Rfl:   •  zolpidem (AMBIEN) 5 MG tablet, Take 1 tablet by mouth At Night As Needed for sleep., Disp: , Rfl: 0  •  DAPTOmycin 700 mg in sodium chloride 0.9 % 50 mL, Infuse 700 mg into a venous catheter Daily. Indications: Bacteria in the Blood, Infection of Blood or Tissues affecting the Whole Body, Disp: , Rfl:   Allergies   Allergen Reactions   • No Known Drug Allergy      Social History     Social History   • Marital status:      Spouse name: N/A  "  • Number of children: 1   • Years of education: N/A     Occupational History   • Security      Disabled-Diabetic Retinopathy     Social History Main Topics   • Smoking status: Never Smoker   • Smokeless tobacco: Never Used   • Alcohol use No   • Drug use: No   • Sexual activity: Not on file     Other Topics Concern   • Not on file     Social History Narrative     Family History   Problem Relation Age of Onset   • Arthritis Mother    • Cancer Mother    • Hypertension Mother    • Kidney disease Mother    • Stroke Mother    • Heart attack Father    • Arthritis Father    • Diabetes Father    • Hyperlipidemia Father    • Hypertension Father    • Mental illness Sister    • Diabetes Brother    • Hypertension Brother    • Obesity Brother      ROS: As per HPI, otherwise negative   Vitals:    04/05/17 1117   BP: (!) 160/110   BP Location: Right arm   Patient Position: Sitting   Pulse: 101   Temp: 98.1 °F (36.7 °C)   SpO2: 98%   Weight: 244 lb (111 kg)   Height: 75\" (190.5 cm)      Physical Exam:  Gen - NAD, pleasant, cooperative  CV - Regular rate and rhythm, no murmur or rub  Pulm - Lungs clear to auscultation  Abd - Soft, normoactive bowel sounds  Ext - Left BKA stump present  Incision - Healing well, 34 sutures present in left bka stump with heavy scabbing present, no evidence of dehiscence, drainage or dion blood present    Labs/Imaging: n/a    Assessment/Plan:  I have removed all 34 sutures from the patient's left BKA stump and placed 12 steri-strips vertically along the incision with mastisol then wrapped it with Kerlex and covered it with a spandage covering. The patient is doing well and I will refer them to an Orthopedist of that is geographically convenient for them for stump shrinking/sizing. The patient may follow up with this office on a PRN basis.        Patient Active Problem List   Diagnosis   • DUNLAP Cirrhosis   • Essential hypertension   • Non-compliance   • Type 2 diabetes mellitus with hyperosmolarity " without nonketotic hyperglycemic-hyperosmolar coma (nkhhc)   • Arthritis   • Hyperlipemia   • Hypertriglyceridemia, essential   • Diabetic foot ulcer associated with diabetes mellitus due to underlying condition   • Cellulitis of left foot   • Poorly controlled type 2 diabetes mellitus with peripheral neuropathy   • DUNLAP (nonalcoholic steatohepatitis)   • Hyponatremia   • Neutrophilic leukocytosis   • Hypomagnesemia   • Encephalopathy, hepatic   • Hypertension   • Osteomyelitis of left foot   • Sepsis   • Diabetic foot infection   • Diabetic neuropathy     Signed by:

## 2017-04-17 ENCOUNTER — TELEPHONE (OUTPATIENT)
Dept: CARDIAC SURGERY | Facility: CLINIC | Age: 47
End: 2017-04-17

## 2017-04-17 NOTE — TELEPHONE ENCOUNTER
PATIENTS WIFE ELIF CALLED, ELLIOT FELL ON HIS AMPUTATED KNEE 2 DAYS AGO AT HOME WHILE TRANSFERRING FROM WHEELCHAIR TO HIS BED. PATIENT WAS TRANSPORTED VIA AMBULANCE TO [facility] ER WHERE DOCTORS APPLIED A WET TO DRY DRESSING TO THE WOUND. ELLIOT WAS ADVISED BY [facility] ER THAT HE DID NOT NEED STITCHES.      ELIF CALLED TODAY MAKE [facility] AWARE OF HIS MOST RECENT ER VISIT AND INJURY.     I SPOKE WITH ELLIOT AND VIA ER ADVICE HE CALLED TO MAKE AND APPOINTMENT WITH CT SURGERY.   I MADE ELLIOT AN APPOINTMENT 4/18/17 WITH LAUREN MORIN @ 2:00PM PT VERIFIED APPOINTMENT.

## 2017-04-18 ENCOUNTER — OFFICE VISIT (OUTPATIENT)
Dept: CARDIAC SURGERY | Facility: CLINIC | Age: 47
End: 2017-04-18

## 2017-04-18 VITALS
DIASTOLIC BLOOD PRESSURE: 106 MMHG | HEIGHT: 75 IN | HEART RATE: 110 BPM | SYSTOLIC BLOOD PRESSURE: 177 MMHG | BODY MASS INDEX: 30.34 KG/M2 | OXYGEN SATURATION: 99 % | TEMPERATURE: 97.8 F | WEIGHT: 244 LBS

## 2017-04-18 DIAGNOSIS — M86.9 OSTEOMYELITIS OF LEFT FOOT, UNSPECIFIED CHRONICITY: Primary | ICD-10-CM

## 2017-04-18 PROCEDURE — 99024 POSTOP FOLLOW-UP VISIT: CPT | Performed by: PHYSICIAN ASSISTANT

## 2017-04-18 NOTE — PROGRESS NOTES
04/18/2017  Patient Information  Kendrick Crystal                                                                                          [address] [facility] [id number]   1970  'PCP/Referring Physician'  Juan Rios MD  [phone]  No ref. provider found    Chief Complaint   Patient presents with   • Follow-up     Follow up to check left stump site after a fall 4 days ago s/p left AKA 3/2/17.   • Peripheral Vascular Disease       History of Present Illness:  Patient presents to office for incision check after falling on left above knee amputation site this past Friday s/p AKA 3/2/17. The patient states he was transferring to his bed when he lost his  and fell onto his left AKA stump site and the incision site split open at all points except the very middle of the incision. The patient was taken to [facility] ED via ambulance where they cleaned up his incision site and had him call our office and schedule a follow up appointment. I will contact [facility] for them to evaluate Mr. Crystal for an ipop stump protection device. In the mean time I informed the family that the patient's progress has been set back approximately one month to allow the incision site to heal.     Patient Active Problem List   Diagnosis   • DUNLAP Cirrhosis   • Essential hypertension   • Non-compliance   • Type 2 diabetes mellitus with hyperosmolarity without nonketotic hyperglycemic-hyperosmolar coma (nkhhc)   • Arthritis   • Hyperlipemia   • Hypertriglyceridemia, essential   • Diabetic foot ulcer associated with diabetes mellitus due to underlying condition   • Cellulitis of left foot   • Poorly controlled type 2 diabetes mellitus with peripheral neuropathy   • DUNLAP (nonalcoholic steatohepatitis)   • Hyponatremia   • Neutrophilic leukocytosis   • Hypomagnesemia   • Encephalopathy, hepatic   • Hypertension   • Osteomyelitis of left foot   • Sepsis   • Diabetic foot infection   • Diabetic neuropathy     Past Medical  History:   Diagnosis Date   • Arthritis    • Cirrhosis    • Counseling for insulin pump    • Depression    • Diabetes mellitus    • Encephalopathy, hepatic    • H/O degenerative disc disease    • History of Lissa's gangrene    • Hyperlipemia    • Hypertension    • multiple Skin abscesses    • DUNLAP, bx showed stage IV fibrosis    • Neuropathy    • Peripheral vascular disease      Past Surgical History:   Procedure Laterality Date   • AMPUTATION DIGIT Left 1/30/2017    Procedure: left fourth and fifth transmetatarsal toe amputation ;  Surgeon: Juancho Martinez MD;  Location:  [facility];  Service:    • BELOW KNEE AMPUTATION Left 3/2/2017    Procedure: AMPUTATION BELOW KNEE, SHMUEL;  Surgeon: Juancho Martinez MD;  Location:  [facility];  Service:    • LEG SURGERY     • TESTICLE SURGERY      DEBRIDEMENT FROM GANGRENE       Current Outpatient Prescriptions:   •  acetaminophen (TYLENOL) 325 MG tablet, Take 2 tablets by mouth Every 4 (Four) Hours As Needed for mild pain (1-3)., Disp: , Rfl: 0  •  aspirin 81 MG EC tablet, Take 1 tablet by mouth Daily., Disp: , Rfl:   •  DAPTOmycin 700 mg in sodium chloride 0.9 % 50 mL, Infuse 700 mg into a venous catheter Daily. Indications: Bacteria in the Blood, Infection of Blood or Tissues affecting the Whole Body, Disp: , Rfl:   •  docusate sodium 100 MG capsule, Take 100 mg by mouth 2 (Two) Times a Day As Needed for constipation., Disp: , Rfl: 0  •  famotidine (PEPCID) 20 MG tablet, Take 1 tablet by mouth 2 (Two) Times a Day., Disp: , Rfl:   •  gabapentin (NEURONTIN) 400 MG capsule, Take 3 capsules by mouth 3 (Three) Times a Day., Disp: , Rfl:   •  insulin detemir (LEVEMIR) 100 UNIT/ML injection, Inject 30 Units under the skin Every Night., Disp: , Rfl: 12  •  insulin lispro (humaLOG) 100 UNIT/ML injection, Inject 0-9 Units under the skin 4 (Four) Times a Day With Meals & at Bedtime., Disp: , Rfl: 12  •  losartan (COZAAR) 100 MG tablet, Take 1 tablet by mouth Daily., Disp: 30 tablet, Rfl:  "0  •  polyethylene glycol (MIRALAX) packet, Take 17 g by mouth Daily As Needed (constipation)., Disp: , Rfl:   •  probiotic (CULTURELLE) capsule capsule, Take 1 capsule by mouth Daily., Disp: 30 capsule, Rfl:   •  zolpidem (AMBIEN) 5 MG tablet, Take 1 tablet by mouth At Night As Needed for sleep., Disp: , Rfl: 0  Allergies   Allergen Reactions   • No Known Drug Allergy      Social History     Social History   • Marital status:      Spouse name: N/A   • Number of children: 1   • Years of education: N/A     Occupational History   • Security      Disabled-Diabetic Retinopathy     Social History Main Topics   • Smoking status: Never Smoker   • Smokeless tobacco: Never Used   • Alcohol use No   • Drug use: No   • Sexual activity: Not on file     Other Topics Concern   • Not on file     Social History Narrative     Family History   Problem Relation Age of Onset   • Arthritis Mother    • Cancer Mother    • Hypertension Mother    • Kidney disease Mother    • Stroke Mother    • Heart attack Father    • Arthritis Father    • Diabetes Father    • Hyperlipidemia Father    • Hypertension Father    • Mental illness Sister    • Diabetes Brother    • Hypertension Brother    • Obesity Brother      ROS: As per HPI, otherwise negative   Vitals:    04/18/17 1332   BP: (!) 177/106   BP Location: Left arm   Pulse: 110   Temp: 97.8 °F (36.6 °C)   SpO2: 99%   Weight: 244 lb (111 kg)   Height: 75\" (190.5 cm)      Physical Exam:  Gen - NAD, cooperative  CV - Regular rate and rhythm, no murmur gallop or rub  Pulm - Lungs clear to auscultation without wheeze or rales  GI - Soft, normoactive bowel sounds, non-tender  Ext - Right lower extremity without edema, left lower extremity s/p AKA  Incision - Left AKA incision site has dehisced at all points with the exception of the middle portion (approximately 1/5th of the incision length). There is no active bleeding or evidence of infection.    Labs/Imaging: n/a    Assessment/Plan:  Patient " has been instructed to do wet to dry dressings and watch for signs of infection. I have called Kwasi Chance at [facility] for an ipop stump protection device to allow the patient's incision site to fully heal without further incidence. An order for the device and the patient's face sheet has been faxed to Kwasi. The patient has been instructed to follow up with this office in one month to evaluate the patient's progress.     I, Dr. Juancho Martinez, personally performed the services described in the documentation as scribed in my presence and the documentation is both accurate and complete.      Patient Active Problem List   Diagnosis   • DUNLAP Cirrhosis   • Essential hypertension   • Non-compliance   • Type 2 diabetes mellitus with hyperosmolarity without nonketotic hyperglycemic-hyperosmolar coma (nkhhc)   • Arthritis   • Hyperlipemia   • Hypertriglyceridemia, essential   • Diabetic foot ulcer associated with diabetes mellitus due to underlying condition   • Cellulitis of left foot   • Poorly controlled type 2 diabetes mellitus with peripheral neuropathy   • DUNLAP (nonalcoholic steatohepatitis)   • Hyponatremia   • Neutrophilic leukocytosis   • Hypomagnesemia   • Encephalopathy, hepatic   • Hypertension   • Osteomyelitis of left foot   • Sepsis   • Diabetic foot infection   • Diabetic neuropathy     Signed by:

## 2017-04-24 ENCOUNTER — HOSPITAL ENCOUNTER (EMERGENCY)
Facility: HOSPITAL | Age: 47
Discharge: LEFT BEFORE DISCHARGE | End: 2017-04-24

## 2017-04-24 VITALS
DIASTOLIC BLOOD PRESSURE: 75 MMHG | BODY MASS INDEX: 30.34 KG/M2 | HEIGHT: 75 IN | TEMPERATURE: 98.2 F | OXYGEN SATURATION: 96 % | SYSTOLIC BLOOD PRESSURE: 134 MMHG | HEART RATE: 95 BPM | RESPIRATION RATE: 18 BRPM | WEIGHT: 244 LBS

## 2017-04-24 PROCEDURE — 99211 OFF/OP EST MAY X REQ PHY/QHP: CPT

## 2017-04-24 RX ORDER — SODIUM CHLORIDE 0.9 % (FLUSH) 0.9 %
10 SYRINGE (ML) INJECTION AS NEEDED
Status: DISCONTINUED | OUTPATIENT
Start: 2017-04-24 | End: 2017-04-25 | Stop reason: HOSPADM

## 2017-04-25 ENCOUNTER — HOSPITAL ENCOUNTER (INPATIENT)
Facility: HOSPITAL | Age: 47
LOS: 1 days | Discharge: LEFT BEFORE DISCHARGE | End: 2017-04-26
Attending: EMERGENCY MEDICINE | Admitting: INTERNAL MEDICINE

## 2017-04-25 DIAGNOSIS — I73.9 PERIPHERAL VASCULAR DISEASE (HCC): ICD-10-CM

## 2017-04-25 DIAGNOSIS — T81.31XD WOUND DEHISCENCE, SUBSEQUENT ENCOUNTER: Primary | ICD-10-CM

## 2017-04-25 DIAGNOSIS — IMO0001 WOUND INFECTION AFTER SURGERY, SUBSEQUENT ENCOUNTER: ICD-10-CM

## 2017-04-25 PROBLEM — E83.42 HYPOMAGNESEMIA: Status: RESOLVED | Noted: 2017-02-13 | Resolved: 2017-04-25

## 2017-04-25 PROBLEM — E78.1 HYPERTRIGLYCERIDEMIA, ESSENTIAL: Status: RESOLVED | Noted: 2017-01-18 | Resolved: 2017-04-25

## 2017-04-25 PROBLEM — E11.65 POORLY CONTROLLED TYPE 2 DIABETES MELLITUS WITH PERIPHERAL NEUROPATHY (HCC): Chronic | Status: RESOLVED | Noted: 2017-01-25 | Resolved: 2017-04-25

## 2017-04-25 PROBLEM — D72.9 NEUTROPHILIC LEUKOCYTOSIS: Status: RESOLVED | Noted: 2017-01-25 | Resolved: 2017-04-25

## 2017-04-25 PROBLEM — F34.1 DYSTHYMIA: Status: ACTIVE | Noted: 2017-04-25

## 2017-04-25 PROBLEM — K75.81 NASH (NONALCOHOLIC STEATOHEPATITIS): Status: RESOLVED | Noted: 2017-01-25 | Resolved: 2017-04-25

## 2017-04-25 PROBLEM — M19.90 ARTHRITIS: Status: RESOLVED | Noted: 2017-01-17 | Resolved: 2017-04-25

## 2017-04-25 PROBLEM — E11.628 DIABETIC FOOT INFECTION (HCC): Status: RESOLVED | Noted: 2017-02-28 | Resolved: 2017-04-25

## 2017-04-25 PROBLEM — E11.621 DIABETIC FOOT ULCER (HCC): Status: RESOLVED | Noted: 2017-02-13 | Resolved: 2017-04-25

## 2017-04-25 PROBLEM — E87.1 HYPONATREMIA: Status: RESOLVED | Noted: 2017-01-25 | Resolved: 2017-04-25

## 2017-04-25 PROBLEM — E11.42 POORLY CONTROLLED TYPE 2 DIABETES MELLITUS WITH PERIPHERAL NEUROPATHY (HCC): Chronic | Status: RESOLVED | Noted: 2017-01-25 | Resolved: 2017-04-25

## 2017-04-25 PROBLEM — L97.509 DIABETIC FOOT ULCER ASSOCIATED WITH DIABETES MELLITUS DUE TO UNDERLYING CONDITION (HCC): Status: RESOLVED | Noted: 2017-01-18 | Resolved: 2017-04-25

## 2017-04-25 PROBLEM — IMO0002 UNCONTROLLED TYPE 1 DIABETES MELLITUS WITH CIRCULATORY COMPLICATION: Status: RESOLVED | Noted: 2017-02-13 | Resolved: 2017-04-25

## 2017-04-25 PROBLEM — E11.59 TYPE 2 DIABETES MELLITUS WITH CIRCULATORY DISORDER (HCC): Status: ACTIVE | Noted: 2017-04-25

## 2017-04-25 PROBLEM — E11.00 TYPE 2 DIABETES MELLITUS WITH HYPEROSMOLARITY WITHOUT NONKETOTIC HYPERGLYCEMIC-HYPEROSMOLAR COMA (NKHHC) (HCC): Status: RESOLVED | Noted: 2017-01-17 | Resolved: 2017-04-25

## 2017-04-25 PROBLEM — E08.621 DIABETIC FOOT ULCER ASSOCIATED WITH DIABETES MELLITUS DUE TO UNDERLYING CONDITION (HCC): Status: RESOLVED | Noted: 2017-01-18 | Resolved: 2017-04-25

## 2017-04-25 PROBLEM — IMO0002 TYPE 1 DIABETES MELLITUS WITH NEUROLOGICAL MANIFESTATIONS, UNCONTROLLED: Status: RESOLVED | Noted: 2017-01-17 | Resolved: 2017-04-25

## 2017-04-25 PROBLEM — E11.621 TYPE 2 DIABETES MELLITUS WITH FOOT ULCER, WITH LONG-TERM CURRENT USE OF INSULIN (HCC): Status: RESOLVED | Noted: 2017-01-25 | Resolved: 2017-04-25

## 2017-04-25 PROBLEM — Z79.4 TYPE 2 DIABETES MELLITUS WITH FOOT ULCER, WITH LONG-TERM CURRENT USE OF INSULIN (HCC): Status: RESOLVED | Noted: 2017-01-25 | Resolved: 2017-04-25

## 2017-04-25 PROBLEM — L08.9 WOUND INFECTION: Status: ACTIVE | Noted: 2017-04-25

## 2017-04-25 PROBLEM — T14.8XXA WOUND INFECTION: Status: ACTIVE | Noted: 2017-04-25

## 2017-04-25 PROBLEM — M86.9 OSTEOMYELITIS OF LEFT FOOT (HCC): Status: RESOLVED | Noted: 2017-02-28 | Resolved: 2017-04-25

## 2017-04-25 PROBLEM — L08.9 DIABETIC FOOT INFECTION (HCC): Status: RESOLVED | Noted: 2017-02-28 | Resolved: 2017-04-25

## 2017-04-25 PROBLEM — L97.509 DIABETIC FOOT ULCER (HCC): Status: RESOLVED | Noted: 2017-02-13 | Resolved: 2017-04-25

## 2017-04-25 PROBLEM — A41.9 SEPSIS (HCC): Status: RESOLVED | Noted: 2017-02-28 | Resolved: 2017-04-25

## 2017-04-25 PROBLEM — E11.40 DIABETIC NEUROPATHY (HCC): Status: RESOLVED | Noted: 2017-02-28 | Resolved: 2017-04-25

## 2017-04-25 PROBLEM — L03.116 CELLULITIS OF LEFT FOOT: Status: RESOLVED | Noted: 2017-01-25 | Resolved: 2017-04-25

## 2017-04-25 PROBLEM — L97.509 TYPE 2 DIABETES MELLITUS WITH FOOT ULCER, WITH LONG-TERM CURRENT USE OF INSULIN (HCC): Status: RESOLVED | Noted: 2017-01-25 | Resolved: 2017-04-25

## 2017-04-25 PROBLEM — Z86.14 HISTORY OF MRSA INFECTION: Status: ACTIVE | Noted: 2017-04-25

## 2017-04-25 LAB
ALBUMIN SERPL-MCNC: 3.6 G/DL (ref 3.2–4.8)
ALBUMIN/GLOB SERPL: 1.1 G/DL (ref 1.5–2.5)
ALP SERPL-CCNC: 122 U/L (ref 25–100)
ALT SERPL W P-5'-P-CCNC: 17 U/L (ref 7–40)
ANION GAP SERPL CALCULATED.3IONS-SCNC: 6 MMOL/L (ref 3–11)
AST SERPL-CCNC: 16 U/L (ref 0–33)
BASOPHILS # BLD AUTO: 0.02 10*3/MM3 (ref 0–0.2)
BASOPHILS NFR BLD AUTO: 0.3 % (ref 0–1)
BILIRUB SERPL-MCNC: 0.2 MG/DL (ref 0.3–1.2)
BILIRUB UR QL STRIP: NEGATIVE
BUN BLD-MCNC: 16 MG/DL (ref 9–23)
BUN/CREAT SERPL: 22.9 (ref 7–25)
CALCIUM SPEC-SCNC: 9.1 MG/DL (ref 8.7–10.4)
CHLORIDE SERPL-SCNC: 97 MMOL/L (ref 99–109)
CLARITY UR: CLEAR
CO2 SERPL-SCNC: 29 MMOL/L (ref 20–31)
COLOR UR: YELLOW
CREAT BLD-MCNC: 0.7 MG/DL (ref 0.6–1.3)
CRP SERPL-MCNC: 1.38 MG/DL (ref 0–1)
D-LACTATE SERPL-SCNC: 1.8 MMOL/L (ref 0.5–2)
DEPRECATED RDW RBC AUTO: 46.8 FL (ref 37–54)
EOSINOPHIL # BLD AUTO: 0.11 10*3/MM3 (ref 0.1–0.3)
EOSINOPHIL NFR BLD AUTO: 1.4 % (ref 0–3)
ERYTHROCYTE [DISTWIDTH] IN BLOOD BY AUTOMATED COUNT: 16 % (ref 11.3–14.5)
ERYTHROCYTE [SEDIMENTATION RATE] IN BLOOD: 45 MM/HR (ref 0–15)
GFR SERPL CREATININE-BSD FRML MDRD: 120 ML/MIN/1.73
GLOBULIN UR ELPH-MCNC: 3.2 GM/DL
GLUCOSE BLD-MCNC: 446 MG/DL (ref 70–100)
GLUCOSE BLDC GLUCOMTR-MCNC: 355 MG/DL (ref 70–130)
GLUCOSE BLDC GLUCOMTR-MCNC: 360 MG/DL (ref 70–130)
GLUCOSE UR STRIP-MCNC: ABNORMAL MG/DL
HCT VFR BLD AUTO: 34.7 % (ref 38.9–50.9)
HGB BLD-MCNC: 11.4 G/DL (ref 13.1–17.5)
HGB UR QL STRIP.AUTO: NEGATIVE
IMM GRANULOCYTES # BLD: 0.01 10*3/MM3 (ref 0–0.03)
IMM GRANULOCYTES NFR BLD: 0.1 % (ref 0–0.6)
KETONES UR QL STRIP: NEGATIVE
LEUKOCYTE ESTERASE UR QL STRIP.AUTO: NEGATIVE
LYMPHOCYTES # BLD AUTO: 1.84 10*3/MM3 (ref 0.6–4.8)
LYMPHOCYTES NFR BLD AUTO: 23.2 % (ref 24–44)
MCH RBC QN AUTO: 25.9 PG (ref 27–31)
MCHC RBC AUTO-ENTMCNC: 32.9 G/DL (ref 32–36)
MCV RBC AUTO: 78.9 FL (ref 80–99)
MONOCYTES # BLD AUTO: 0.4 10*3/MM3 (ref 0–1)
MONOCYTES NFR BLD AUTO: 5 % (ref 0–12)
NEUTROPHILS # BLD AUTO: 5.56 10*3/MM3 (ref 1.5–8.3)
NEUTROPHILS NFR BLD AUTO: 70 % (ref 41–71)
NITRITE UR QL STRIP: NEGATIVE
PH UR STRIP.AUTO: 6 [PH] (ref 5–8)
PLATELET # BLD AUTO: 207 10*3/MM3 (ref 150–450)
PMV BLD AUTO: 11.6 FL (ref 6–12)
POTASSIUM BLD-SCNC: 4 MMOL/L (ref 3.5–5.5)
PROT SERPL-MCNC: 6.8 G/DL (ref 5.7–8.2)
PROT UR QL STRIP: NEGATIVE
RBC # BLD AUTO: 4.4 10*6/MM3 (ref 4.2–5.76)
SODIUM BLD-SCNC: 132 MMOL/L (ref 132–146)
SP GR UR STRIP: 1.03 (ref 1–1.03)
UROBILINOGEN UR QL STRIP: ABNORMAL
WBC NRBC COR # BLD: 7.94 10*3/MM3 (ref 3.5–10.8)

## 2017-04-25 PROCEDURE — 83605 ASSAY OF LACTIC ACID: CPT | Performed by: INTERNAL MEDICINE

## 2017-04-25 PROCEDURE — 81003 URINALYSIS AUTO W/O SCOPE: CPT | Performed by: INTERNAL MEDICINE

## 2017-04-25 PROCEDURE — 85652 RBC SED RATE AUTOMATED: CPT | Performed by: EMERGENCY MEDICINE

## 2017-04-25 PROCEDURE — 25010000002 HEPARIN (PORCINE) PER 1000 UNITS: Performed by: INTERNAL MEDICINE

## 2017-04-25 PROCEDURE — 82962 GLUCOSE BLOOD TEST: CPT

## 2017-04-25 PROCEDURE — 36415 COLL VENOUS BLD VENIPUNCTURE: CPT

## 2017-04-25 PROCEDURE — 87040 BLOOD CULTURE FOR BACTERIA: CPT | Performed by: EMERGENCY MEDICINE

## 2017-04-25 PROCEDURE — 85025 COMPLETE CBC W/AUTO DIFF WBC: CPT | Performed by: EMERGENCY MEDICINE

## 2017-04-25 PROCEDURE — 63710000001 INSULIN REGULAR HUMAN PER 5 UNITS: Performed by: EMERGENCY MEDICINE

## 2017-04-25 PROCEDURE — 99285 EMERGENCY DEPT VISIT HI MDM: CPT

## 2017-04-25 PROCEDURE — 63710000001 INSULIN LISPRO (HUMAN) PER 5 UNITS: Performed by: INTERNAL MEDICINE

## 2017-04-25 PROCEDURE — 80053 COMPREHEN METABOLIC PANEL: CPT | Performed by: EMERGENCY MEDICINE

## 2017-04-25 PROCEDURE — 86140 C-REACTIVE PROTEIN: CPT | Performed by: EMERGENCY MEDICINE

## 2017-04-25 PROCEDURE — 99223 1ST HOSP IP/OBS HIGH 75: CPT | Performed by: FAMILY MEDICINE

## 2017-04-25 PROCEDURE — 25010000002 VANCOMYCIN HCL IN NACL 2-0.9 GM/500ML-% SOLUTION: Performed by: EMERGENCY MEDICINE

## 2017-04-25 PROCEDURE — 25010000003 CEFTRIAXONE PER 250 MG: Performed by: EMERGENCY MEDICINE

## 2017-04-25 RX ORDER — LOSARTAN POTASSIUM 50 MG/1
50 TABLET ORAL
Status: DISCONTINUED | OUTPATIENT
Start: 2017-04-26 | End: 2017-04-26 | Stop reason: HOSPADM

## 2017-04-25 RX ORDER — AMLODIPINE BESYLATE 5 MG/1
5 TABLET ORAL
Status: DISCONTINUED | OUTPATIENT
Start: 2017-04-26 | End: 2017-04-26 | Stop reason: HOSPADM

## 2017-04-25 RX ORDER — ONDANSETRON 4 MG/1
4 TABLET, FILM COATED ORAL EVERY 6 HOURS PRN
Status: DISCONTINUED | OUTPATIENT
Start: 2017-04-25 | End: 2017-04-26 | Stop reason: HOSPADM

## 2017-04-25 RX ORDER — METRONIDAZOLE 500 MG/1
500 TABLET ORAL 3 TIMES DAILY
COMMUNITY
End: 2017-04-25

## 2017-04-25 RX ORDER — HEPARIN SODIUM 5000 [USP'U]/ML
5000 INJECTION, SOLUTION INTRAVENOUS; SUBCUTANEOUS EVERY 8 HOURS SCHEDULED
Status: DISCONTINUED | OUTPATIENT
Start: 2017-04-25 | End: 2017-04-26 | Stop reason: HOSPADM

## 2017-04-25 RX ORDER — NICOTINE POLACRILEX 4 MG
15 LOZENGE BUCCAL
Status: DISCONTINUED | OUTPATIENT
Start: 2017-04-25 | End: 2017-04-26 | Stop reason: HOSPADM

## 2017-04-25 RX ORDER — AMLODIPINE BESYLATE 5 MG/1
5 TABLET ORAL 2 TIMES DAILY
Status: ON HOLD | COMMUNITY
End: 2018-08-08 | Stop reason: ALTCHOICE

## 2017-04-25 RX ORDER — VANCOMYCIN 2 GRAM/500 ML IN 0.9 % SODIUM CHLORIDE INTRAVENOUS
2000 ONCE
Status: COMPLETED | OUTPATIENT
Start: 2017-04-25 | End: 2017-04-25

## 2017-04-25 RX ORDER — SODIUM CHLORIDE 0.9 % (FLUSH) 0.9 %
1-10 SYRINGE (ML) INJECTION AS NEEDED
Status: DISCONTINUED | OUTPATIENT
Start: 2017-04-25 | End: 2017-04-26 | Stop reason: HOSPADM

## 2017-04-25 RX ORDER — CLONIDINE HYDROCHLORIDE 0.1 MG/1
0.2 TABLET ORAL 2 TIMES DAILY
COMMUNITY
End: 2017-04-25

## 2017-04-25 RX ORDER — DEXTROSE MONOHYDRATE 25 G/50ML
25 INJECTION, SOLUTION INTRAVENOUS
Status: DISCONTINUED | OUTPATIENT
Start: 2017-04-25 | End: 2017-04-26 | Stop reason: HOSPADM

## 2017-04-25 RX ORDER — ACETAMINOPHEN 325 MG/1
650 TABLET ORAL EVERY 4 HOURS PRN
Status: DISCONTINUED | OUTPATIENT
Start: 2017-04-25 | End: 2017-04-26 | Stop reason: HOSPADM

## 2017-04-25 RX ORDER — CEFTRIAXONE SODIUM 1 G/50ML
1 INJECTION, SOLUTION INTRAVENOUS EVERY 24 HOURS
Status: DISCONTINUED | OUTPATIENT
Start: 2017-04-26 | End: 2017-04-26

## 2017-04-25 RX ORDER — SODIUM CHLORIDE 9 MG/ML
50 INJECTION, SOLUTION INTRAVENOUS CONTINUOUS
Status: DISCONTINUED | OUTPATIENT
Start: 2017-04-25 | End: 2017-04-26 | Stop reason: HOSPADM

## 2017-04-25 RX ORDER — GLIMEPIRIDE 4 MG/1
4 TABLET ORAL
Status: ON HOLD | COMMUNITY
End: 2018-08-08 | Stop reason: ALTCHOICE

## 2017-04-25 RX ORDER — METOPROLOL SUCCINATE 25 MG/1
150 TABLET, EXTENDED RELEASE ORAL 2 TIMES DAILY
Status: ON HOLD | COMMUNITY
End: 2018-08-08 | Stop reason: ALTCHOICE

## 2017-04-25 RX ORDER — SODIUM CHLORIDE 0.9 % (FLUSH) 0.9 %
10 SYRINGE (ML) INJECTION AS NEEDED
Status: DISCONTINUED | OUTPATIENT
Start: 2017-04-25 | End: 2017-04-26 | Stop reason: HOSPADM

## 2017-04-25 RX ORDER — CEFTRIAXONE SODIUM 1 G/50ML
1 INJECTION, SOLUTION INTRAVENOUS ONCE
Status: COMPLETED | OUTPATIENT
Start: 2017-04-25 | End: 2017-04-25

## 2017-04-25 RX ORDER — CLONIDINE HYDROCHLORIDE 0.2 MG/1
0.2 TABLET ORAL 2 TIMES DAILY
Status: ON HOLD | COMMUNITY
End: 2018-08-08 | Stop reason: ALTCHOICE

## 2017-04-25 RX ORDER — PANTOPRAZOLE SODIUM 40 MG/1
40 TABLET, DELAYED RELEASE ORAL
Status: DISCONTINUED | OUTPATIENT
Start: 2017-04-26 | End: 2017-04-26 | Stop reason: HOSPADM

## 2017-04-25 RX ORDER — ONDANSETRON 2 MG/ML
4 INJECTION INTRAMUSCULAR; INTRAVENOUS EVERY 6 HOURS PRN
Status: DISCONTINUED | OUTPATIENT
Start: 2017-04-25 | End: 2017-04-26 | Stop reason: HOSPADM

## 2017-04-25 RX ORDER — METOPROLOL SUCCINATE 25 MG/1
25 TABLET, EXTENDED RELEASE ORAL
Status: DISCONTINUED | OUTPATIENT
Start: 2017-04-26 | End: 2017-04-26 | Stop reason: HOSPADM

## 2017-04-25 RX ORDER — HYDROCODONE BITARTRATE AND ACETAMINOPHEN 10; 325 MG/1; MG/1
1 TABLET ORAL EVERY 8 HOURS PRN
Status: ON HOLD | COMMUNITY
End: 2018-07-29

## 2017-04-25 RX ORDER — FAMOTIDINE 20 MG/1
20 TABLET, FILM COATED ORAL DAILY
Status: ON HOLD | COMMUNITY
End: 2018-08-08 | Stop reason: ALTCHOICE

## 2017-04-25 RX ADMIN — Medication 10 ML: at 17:04

## 2017-04-25 RX ADMIN — HEPARIN SODIUM 5000 UNITS: 5000 INJECTION, SOLUTION INTRAVENOUS; SUBCUTANEOUS at 21:18

## 2017-04-25 RX ADMIN — Medication 10 ML: at 18:19

## 2017-04-25 RX ADMIN — INSULIN LISPRO 6 UNITS: 100 INJECTION, SOLUTION INTRAVENOUS; SUBCUTANEOUS at 21:18

## 2017-04-25 RX ADMIN — INSULIN HUMAN 15 UNITS: 100 INJECTION, SOLUTION PARENTERAL at 18:20

## 2017-04-25 RX ADMIN — Medication 2000 MG: at 18:22

## 2017-04-25 RX ADMIN — SODIUM CHLORIDE 50 ML/HR: 9 INJECTION, SOLUTION INTRAVENOUS at 21:18

## 2017-04-25 RX ADMIN — Medication 10 ML: at 17:43

## 2017-04-25 RX ADMIN — CEFTRIAXONE SODIUM 1 G: 1 INJECTION, SOLUTION INTRAVENOUS at 17:44

## 2017-04-25 NOTE — H&P
"    [facility] Medicine Services  HISTORY AND PHYSICAL    Primary Care Physician: Juan Rios MD    Subjective     Chief Complaint:  Wound Infection, Failed Outpatient Therapy    History of Present Illness:     PT is a 46 Y.O.M with a PMH significant for arthritis, depression, diabetes, degenerative disc disease, hyperlipidemia, hypertension, and peripheral vascular disease.  Patient is seen in the ED today for a wound infection.  Patient had a left BKA by Dr. Martinez on March 2, 2017.  His wife provides most of the history.  One week ago his wife reports that he was seen by Dr. Martinez for evaluation after he fell onto his stump site opening the incision.  He been receiving outpatient treatment.  Roughly 4 days ago he noticed odorous drainage, redness, and pain with associated fatigue and nausea.  Originally the patient presented to [facility] ED last night, however he did not want to wait to be seen by a physician so he then presented to [facility] ED. CT scan as well as wound cultures completed at [facility].  He denies any recent fevers, vomiting or diarrhea.  Denies any abdominal pain.  No change in bowel pattern. His wife states that he has complained of some difficulty urinating.     Of note his wife reports that he has been \"extremely depressed\" for the past month. She reports at the beginning of April that he quit taking all of his medications including his insulin. She reports that he would not check his glucose levels either. During his examination and interview he was extremely tearful. He states that he is \"tired of being sick and just wants to go home\". He has an appointment with [facility], however he has been unable to be seen yet.     ED workup reveals blood glucose of 446, potassium 4.0 creatinine 0.70, CRP 1.38 and sedimentation rate 45, WBC 7.94.  CT scan requested from [facility] ED.  Wound culture from [facility] currently " growing gram-negative rods. He will be admitted to Hospital medicine Services at this time for further evaluation and treatment.     Review of Systems   Constitutional: Positive for activity change, chills and fatigue. Negative for appetite change and fever.   HENT: Negative.    Eyes: Negative.    Respiratory: Negative.    Cardiovascular: Negative.    Gastrointestinal: Positive for nausea. Negative for abdominal distention, abdominal pain, blood in stool, diarrhea and vomiting.   Genitourinary: Positive for difficulty urinating. Negative for dysuria, hematuria and urgency.   Musculoskeletal: Negative.    Skin: Positive for pallor and wound. Negative for color change and rash.   Neurological: Negative.    Psychiatric/Behavioral: Positive for behavioral problems, dysphoric mood and sleep disturbance. Negative for self-injury and suicidal ideas.   Otherwise complete ROS performed and negative except as mentioned in the HPI.    Past Medical History:   Diagnosis Date   • Arthritis    • Cirrhosis    • Counseling for insulin pump    • Depression    • Diabetes mellitus    • Encephalopathy, hepatic    • H/O degenerative disc disease    • History of Lissa's gangrene    • Hyperlipemia    • Hypertension    • multiple Skin abscesses    • DUNLAP, bx showed stage IV fibrosis    • Neuropathy    • Peripheral vascular disease        Past Surgical History:   Procedure Laterality Date   • AMPUTATION DIGIT Left 1/30/2017    Procedure: left fourth and fifth transmetatarsal toe amputation ;  Surgeon: Juancho Martinez MD;  Location:  [facility];  Service:    • BELOW KNEE AMPUTATION Left 3/2/2017    Procedure: AMPUTATION BELOW KNEE, SHMUEL;  Surgeon: Juancho Martinez MD;  Location:  [facility];  Service:    • LEG SURGERY     • TESTICLE SURGERY      DEBRIDEMENT FROM GANGRENE       Family History   Problem Relation Age of Onset   • Arthritis Mother    • Hypertension Mother    • Kidney disease Mother    • Stroke Mother    • Heart attack Father    •  "Arthritis Father    • Diabetes Father    • Hyperlipidemia Father    • Hypertension Father    • Mental illness Sister    • Diabetes Brother    • Hypertension Brother    • Obesity Brother        Social History     Social History   • Marital status:      Spouse name: N/A   • Number of children: 1   • Years of education: N/A     Occupational History   • Security      Disabled-Diabetic Retinopathy     Social History Main Topics   • Smoking status: Never Smoker   • Smokeless tobacco: Never Used   • Alcohol use No   • Drug use: No   • Sexual activity: Not on file     Medications:  Reviewed on Admission and reconciled.    Allergies:  Allergies   Allergen Reactions   • No Known Drug Allergy      Objective     Physical Exam:  Vital Signs: BP (!) 179/102  Pulse 90  Temp 98.1 °F (36.7 °C) (Oral)   Resp 16  Ht 74\" (188 cm)  Wt 244 lb (111 kg)  SpO2 97%  BMI 31.33 kg/m2  Physical Exam   Constitutional: He is oriented to person, place, and time. He appears well-developed and well-nourished. He is cooperative.   HENT:   Head: Normocephalic and atraumatic.   Right Ear: Hearing and external ear normal.   Left Ear: Hearing and external ear normal.   Nose: Nose normal.   Mouth/Throat: Uvula is midline, oropharynx is clear and moist and mucous membranes are normal.   Eyes: Conjunctivae and EOM are normal. Pupils are equal, round, and reactive to light. No scleral icterus.   Neck: Trachea normal and normal range of motion. Neck supple. No JVD present. Carotid bruit is not present. No thyromegaly present.   Cardiovascular: Normal rate, regular rhythm, normal heart sounds and intact distal pulses.    Pulmonary/Chest: Effort normal and breath sounds normal.   Abdominal: Soft. Bowel sounds are normal. There is no hepatosplenomegaly. There is no tenderness.   Musculoskeletal: Normal range of motion.   Lymphadenopathy:        Left: No inguinal adenopathy present.   Neurological: He is alert and oriented to person, place, and " time. He has normal strength. A sensory deficit is present.   Skin: Skin is warm and dry.        Wound dressing with ace bandage to left lower extremity   Psychiatric: His behavior is normal. Judgment normal. Cognition and memory are normal. He exhibits a depressed mood. He expresses no suicidal ideation. He is noncommunicative.   His wife provides the historical narrative.  He demonstrates poor eye contact.   Nursing note and vitals reviewed.    Results Reviewed:    Results from last 7 days  Lab Units 04/25/17  1710   WBC 10*3/mm3 7.94   HEMOGLOBIN g/dL 11.4*   PLATELETS 10*3/mm3 207       Results from last 7 days  Lab Units 04/25/17  1710   SODIUM mmol/L 132   POTASSIUM mmol/L 4.0   TOTAL CO2 mmol/L 29.0   CREATININE mg/dL 0.70   GLUCOSE mg/dL 446*   CALCIUM mg/dL 9.1     I have personally reviewed and interpreted available lab data, radiology studies and ECG obtained at time of admission.     Assessment / Plan     Assessment/Problem List:   Hospital Problem List     * (Principal)Wound infection of left leg    Type 2 diabetes mellitus with circulatory disorder and uncontrolled    History of MRSA infection    DUNLAP Cirrhosis    Essential hypertension    Non-compliance    Hyperlipemia    Dysthymia        Plan:  - admit to telemetry  - CT surgery consult to Dr. Martinez in AM    - ID consult in AM, for now continue vancomycin and rocephin   - ACHS with SSI with am labs ordered  - Wound culture ordered   - CT Scan media requested from [facility] ED  - WOC to see in AM, continue home ordered wet to dry dressings   - urinalysis ordered  - monitor HTN, decreased home cozaar to 50mg, continue home amlodipine and metoprolol, HOLD HOME CLONIDINE    - gentle IV hydration  - consult spiritual care  - Zoloft 50 mg po once daily    DVT prophylaxis: subq heparin & TEDS/SCDS  Code Status: FULL   Admission Status: Patient will be admitted to [facility].  I believe this patient meets INPATIENT status due to the need for care which  can only be reasonably provided in an hospital setting such as aggressive/expedited ancillary services and/or consultation services and the necessity for IV medications, close physician monitoring and/or the possible need for procedures.  In such, I feel patient’s risk for adverse outcomes and need for care warrant INPATIENT evaluation and predict the patient’s care encounter to likely last beyond 2 midnights.  I have seen and examined the patient, performing an independent face-to-face diagnostic evaluation with plan of care reviewed and developed with the advanced practice clinician (APC). I have addended and modified the above history of present illness, physical examination, and assessment and plan to reflect my findings and impressions. See above for further detailed assessment and plan developed with APC which I have reviewed and/or edited.     Delon Payne MD 04/25/17 8:25 PM

## 2017-04-25 NOTE — ED PROVIDER NOTES
"Subjective   HPI Comments: Kendrick Crystal is a 46 y.o.male who presents to the ED with a wound infection. He had a left sided AKA on 3/2/17 and 7 days ago he was seen by his surgeon for evaluation when he fell onto his stump site, splitting open the incision. He states that 4 days ago he began noticing redness, pain, malodor and drainage from his stump and he has began to feel fatigued and nauseated. Last night he was seen at [facility] ED where he had a CT done showing \"infection up his leg\" and he left to come here due to his surgeon being here. He came to the ED last night but left prior to being seen by a physician, he returns today for evaluation. He denies any fevers, vomiting or other acute complaints.     Surgeon Michelle     Patient is a 46 y.o. male presenting with wound check.   History provided by:  Patient  Wound Check   Location:  Left AKA  Quality:  Draining, red, painful.   Severity:  Mild  Onset quality:  Sudden  Duration:  1 week  Timing:  Constant  Progression:  Worsening  Chronicity:  New  Context:  Fell on left AKA stump  Relieved by:  Nothing  Worsened by:  Nothing  Associated symptoms: nausea    Associated symptoms: no abdominal pain, no chest pain, no congestion, no cough, no fever and no shortness of breath        Review of Systems   Constitutional: Negative for fever.   HENT: Negative for congestion.    Respiratory: Negative for cough and shortness of breath.    Cardiovascular: Negative for chest pain.   Gastrointestinal: Positive for nausea. Negative for abdominal pain.   Skin: Positive for color change and wound.   All other systems reviewed and are negative.      Past Medical History:   Diagnosis Date   • Arthritis    • Cirrhosis    • Counseling for insulin pump    • Depression    • Diabetes mellitus    • Encephalopathy, hepatic    • H/O degenerative disc disease    • History of Lissa's gangrene    • Hyperlipemia    • Hypertension    • multiple Skin abscesses    • DUNLAP, bx showed " stage IV fibrosis    • Neuropathy    • Peripheral vascular disease        Allergies   Allergen Reactions   • No Known Drug Allergy        Past Surgical History:   Procedure Laterality Date   • AMPUTATION DIGIT Left 1/30/2017    Procedure: left fourth and fifth transmetatarsal toe amputation ;  Surgeon: Juancho Martinez MD;  Location:  [facility];  Service:    • BELOW KNEE AMPUTATION Left 3/2/2017    Procedure: AMPUTATION BELOW KNEE, SHMUEL;  Surgeon: Juancho Martinez MD;  Location:  [facility];  Service:    • LEG SURGERY     • TESTICLE SURGERY      DEBRIDEMENT FROM GANGRENE       Family History   Problem Relation Age of Onset   • Arthritis Mother    • Hypertension Mother    • Kidney disease Mother    • Stroke Mother    • Heart attack Father    • Arthritis Father    • Diabetes Father    • Hyperlipidemia Father    • Hypertension Father    • Mental illness Sister    • Diabetes Brother    • Hypertension Brother    • Obesity Brother        Social History     Social History   • Marital status:      Spouse name: N/A   • Number of children: 1   • Years of education: N/A     Occupational History   • Security      Disabled-Diabetic Retinopathy     Social History Main Topics   • Smoking status: Never Smoker   • Smokeless tobacco: Never Used   • Alcohol use No   • Drug use: No   • Sexual activity: Not Asked     Other Topics Concern   • None     Social History Narrative         Objective   Physical Exam   Constitutional: He is oriented to person, place, and time. He appears well-developed and well-nourished. No distress.   HENT:   Head: Normocephalic and atraumatic.   Eyes: Conjunctivae are normal. No scleral icterus.   Neck: Normal range of motion. Neck supple.   Cardiovascular: Normal rate, regular rhythm and normal heart sounds.    Pulmonary/Chest: Effort normal and breath sounds normal. No respiratory distress.   Abdominal: Soft. There is no tenderness.   Musculoskeletal: He exhibits no edema.   Lymphadenopathy:     He has no  cervical adenopathy.   Neurological: He is alert and oriented to person, place, and time.   Skin: Skin is warm and dry. He is not diaphoretic.   Incision on his left AKA stump is open with a foul smell, did not appear to be significantly erythematous.    Psychiatric: He has a normal mood and affect. His behavior is normal.   Nursing note and vitals reviewed.      Procedures         ED Course  ED Course   Comment By Time   Dr. Martinez paged. Mina Eugene 04/25 1644   I obtained records from his visit to [facility] last night.  His white blood cell count was normal.  A wound culture is already growing out gram-negative rods.  I don't see any mention of a CAT scan.  I have paged Dr. Martinez. Patrick Villanueva MD 04/25 1645   I talked to Dr. Martinez who recommended admission and IV antibiotics.  He asked that we obtain a copy of the actual images of his CT from last night.  He suggested a .  I spoke with Dr. Mclain who is on-call for the hospitalist service and she will admit Mr. Crystal. Patrick Villanueva MD 04/25 3323   I spoke with Mr. Crystal and his wife about [facility] for admission and the need for his CT.  His wife advised me that she will pick it up on her way here in the morning. Patrick Villanueva MD 04/25 1756       No results found for this or any previous visit (from the past 24 hour(s)).  Note: In addition to lab results from this visit, the labs listed above may include labs taken at another facility or during a different encounter within the last 24 hours. Please correlate lab times with ED admission and discharge times for further clarification of the services performed during this visit.    No orders to display     Vitals:    04/26/17 0353 04/26/17 0759 04/26/17 0936 04/26/17 1157   BP: (!) 159/104 (!) 177/103 (!) 175/109 (!) 177/108   BP Location: Right arm Right arm  Left arm   Patient Position: Lying Lying  Lying   Pulse: 88 85 85 91   Resp: 16 18  16   Temp: 98.7 °F (37.1 °C) 98.2 °F (36.8  °C)  97.9 °F (36.6 °C)   TempSrc: Oral Oral  Oral   SpO2: 100% 98%     Weight:       Height:         Medications   vancomycin IVPB 2000 mg in 0.9% Sodium Chloride (premix) 500 mL (2,000 mg Intravenous Handoff 4/25/17 1939)   cefTRIAXone (ROCEPHIN) IVPB 1 g (0 g Intravenous Stopped 4/25/17 1815)   insulin regular (humuLIN R,novoLIN R) injection 15 Units (15 Units Subcutaneous Given 4/25/17 1820)     ECG/EMG Results (last 24 hours)     ** No results found for the last 24 hours. **                      MDM  Number of Diagnoses or Management Options  Peripheral vascular disease: established and worsening  Wound dehiscence, subsequent encounter: new and does not require workup  Wound infection after surgery, subsequent encounter: new and requires workup     Amount and/or Complexity of Data Reviewed  Clinical lab tests: ordered and reviewed  Review and summarize past medical records: yes  Discuss the patient with other providers: yes    Patient Progress  Patient progress: stable    EMR Dragon/Transcription disclaimer:   Much of this encounter note is an electronic transcription/translation of spoken language to printed text. The electronic translation of spoken language may permit erroneous, or at times, nonsensical words or phrases to be inadvertently transcribed; Although I have reviewed the note for such errors, some may still exist.     Final diagnoses:   Wound dehiscence, subsequent encounter   Wound infection after surgery, subsequent encounter   Peripheral vascular disease       Documentation assistance provided by andrea Eugene.  Information recorded by the scrmonique was done at my direction and has been verified and validated by me.     Mina Eugene  04/25/17 3331       Patrick Villanueva MD  04/28/17 3473

## 2017-04-26 VITALS
TEMPERATURE: 97.9 F | SYSTOLIC BLOOD PRESSURE: 177 MMHG | DIASTOLIC BLOOD PRESSURE: 108 MMHG | WEIGHT: 244 LBS | HEIGHT: 74 IN | OXYGEN SATURATION: 98 % | BODY MASS INDEX: 31.32 KG/M2 | RESPIRATION RATE: 16 BRPM | HEART RATE: 91 BPM

## 2017-04-26 LAB
ANION GAP SERPL CALCULATED.3IONS-SCNC: 4 MMOL/L (ref 3–11)
BASOPHILS # BLD AUTO: 0.04 10*3/MM3 (ref 0–0.2)
BASOPHILS NFR BLD AUTO: 0.5 % (ref 0–1)
BUN BLD-MCNC: 12 MG/DL (ref 9–23)
BUN/CREAT SERPL: 20 (ref 7–25)
CALCIUM SPEC-SCNC: 9.3 MG/DL (ref 8.7–10.4)
CHLORIDE SERPL-SCNC: 99 MMOL/L (ref 99–109)
CO2 SERPL-SCNC: 29 MMOL/L (ref 20–31)
CREAT BLD-MCNC: 0.6 MG/DL (ref 0.6–1.3)
DEPRECATED RDW RBC AUTO: 46.4 FL (ref 37–54)
EOSINOPHIL # BLD AUTO: 0.11 10*3/MM3 (ref 0.1–0.3)
EOSINOPHIL NFR BLD AUTO: 1.3 % (ref 0–3)
ERYTHROCYTE [DISTWIDTH] IN BLOOD BY AUTOMATED COUNT: 16.1 % (ref 11.3–14.5)
GFR SERPL CREATININE-BSD FRML MDRD: 144 ML/MIN/1.73
GLUCOSE BLD-MCNC: 284 MG/DL (ref 70–100)
GLUCOSE BLD-MCNC: 346 MG/DL (ref 70–100)
GLUCOSE BLDC GLUCOMTR-MCNC: 290 MG/DL (ref 70–130)
GLUCOSE BLDC GLUCOMTR-MCNC: 360 MG/DL (ref 70–130)
GLUCOSE BLDC GLUCOMTR-MCNC: 436 MG/DL (ref 70–130)
HBA1C MFR BLD: 10.1 % (ref 4.8–5.6)
HCT VFR BLD AUTO: 35.5 % (ref 38.9–50.9)
HGB BLD-MCNC: 11.6 G/DL (ref 13.1–17.5)
IMM GRANULOCYTES # BLD: 0.02 10*3/MM3 (ref 0–0.03)
IMM GRANULOCYTES NFR BLD: 0.2 % (ref 0–0.6)
LYMPHOCYTES # BLD AUTO: 2.05 10*3/MM3 (ref 0.6–4.8)
LYMPHOCYTES NFR BLD AUTO: 24.8 % (ref 24–44)
MCH RBC QN AUTO: 25.7 PG (ref 27–31)
MCHC RBC AUTO-ENTMCNC: 32.7 G/DL (ref 32–36)
MCV RBC AUTO: 78.7 FL (ref 80–99)
MONOCYTES # BLD AUTO: 0.59 10*3/MM3 (ref 0–1)
MONOCYTES NFR BLD AUTO: 7.1 % (ref 0–12)
NEUTROPHILS # BLD AUTO: 5.46 10*3/MM3 (ref 1.5–8.3)
NEUTROPHILS NFR BLD AUTO: 66.1 % (ref 41–71)
PLATELET # BLD AUTO: 187 10*3/MM3 (ref 150–450)
PMV BLD AUTO: 10.7 FL (ref 6–12)
POTASSIUM BLD-SCNC: 3.7 MMOL/L (ref 3.5–5.5)
RBC # BLD AUTO: 4.51 10*6/MM3 (ref 4.2–5.76)
SODIUM BLD-SCNC: 132 MMOL/L (ref 132–146)
WBC NRBC COR # BLD: 8.27 10*3/MM3 (ref 3.5–10.8)

## 2017-04-26 PROCEDURE — 85025 COMPLETE CBC W/AUTO DIFF WBC: CPT | Performed by: INTERNAL MEDICINE

## 2017-04-26 PROCEDURE — 25010000002 VANCOMYCIN: Performed by: INTERNAL MEDICINE

## 2017-04-26 PROCEDURE — 25010000002 HEPARIN (PORCINE) PER 1000 UNITS: Performed by: INTERNAL MEDICINE

## 2017-04-26 PROCEDURE — 80048 BASIC METABOLIC PNL TOTAL CA: CPT | Performed by: INTERNAL MEDICINE

## 2017-04-26 PROCEDURE — 99233 SBSQ HOSP IP/OBS HIGH 50: CPT | Performed by: INTERNAL MEDICINE

## 2017-04-26 PROCEDURE — 63710000001 INSULIN DETEMIR PER 5 UNITS: Performed by: INTERNAL MEDICINE

## 2017-04-26 PROCEDURE — 82947 ASSAY GLUCOSE BLOOD QUANT: CPT

## 2017-04-26 PROCEDURE — 83036 HEMOGLOBIN GLYCOSYLATED A1C: CPT | Performed by: INTERNAL MEDICINE

## 2017-04-26 PROCEDURE — 25010000002 PIPERACILLIN-TAZOBACTAM: Performed by: INTERNAL MEDICINE

## 2017-04-26 RX ORDER — PAROXETINE HYDROCHLORIDE 20 MG/1
20 TABLET, FILM COATED ORAL DAILY
Status: DISCONTINUED | OUTPATIENT
Start: 2017-04-26 | End: 2017-04-26 | Stop reason: HOSPADM

## 2017-04-26 RX ADMIN — INSULIN LISPRO 5 UNITS: 100 INJECTION, SOLUTION INTRAVENOUS; SUBCUTANEOUS at 09:49

## 2017-04-26 RX ADMIN — PANTOPRAZOLE SODIUM 40 MG: 40 TABLET, DELAYED RELEASE ORAL at 06:27

## 2017-04-26 RX ADMIN — HEPARIN SODIUM 5000 UNITS: 5000 INJECTION, SOLUTION INTRAVENOUS; SUBCUTANEOUS at 14:15

## 2017-04-26 RX ADMIN — INSULIN DETEMIR 10 UNITS: 100 INJECTION, SOLUTION SUBCUTANEOUS at 14:16

## 2017-04-26 RX ADMIN — LOSARTAN POTASSIUM 50 MG: 50 TABLET, FILM COATED ORAL at 09:36

## 2017-04-26 RX ADMIN — HEPARIN SODIUM 5000 UNITS: 5000 INJECTION, SOLUTION INTRAVENOUS; SUBCUTANEOUS at 06:26

## 2017-04-26 RX ADMIN — PAROXETINE HYDROCHLORIDE 20 MG: 20 TABLET, FILM COATED ORAL at 14:19

## 2017-04-26 RX ADMIN — AMLODIPINE BESYLATE 5 MG: 5 TABLET ORAL at 09:36

## 2017-04-26 RX ADMIN — INSULIN LISPRO 6 UNITS: 100 INJECTION, SOLUTION INTRAVENOUS; SUBCUTANEOUS at 13:34

## 2017-04-26 RX ADMIN — SERTRALINE HYDROCHLORIDE 50 MG: 50 TABLET ORAL at 09:36

## 2017-04-26 RX ADMIN — VANCOMYCIN HYDROCHLORIDE 1500 MG: 1 INJECTION, POWDER, LYOPHILIZED, FOR SOLUTION INTRAVENOUS at 06:26

## 2017-04-26 RX ADMIN — METOPROLOL SUCCINATE 25 MG: 25 TABLET, EXTENDED RELEASE ORAL at 09:36

## 2017-04-26 RX ADMIN — PIPERACILLIN SODIUM,TAZOBACTAM SODIUM 3.38 G: 3; .375 INJECTION, POWDER, FOR SOLUTION INTRAVENOUS at 11:53

## 2017-04-26 NOTE — PROGRESS NOTES
Pharmacy note vancomycin  Vancomycin for skin/soft tissue infection; target trough: 10-15;  Patient age: 46; weight: 111 kg; height: 74 in; scr: 0.7 mg/dl; est crcl: 150-175 ml/min; note that patient has left above the knee amputation;  Patient received 2000 mg(20 mg/kg) vancomycin in er  Note that on prior admission earlier this year vancomycin 1500 mg iv q12h gave trough of 11-12;  Will start patient on vancomycin 1500 mg iv q12h on 4/26; will check vancomycin trough prior to 4th total dose on 4/27

## 2017-04-26 NOTE — PROGRESS NOTES
"    [facility] Medicine Services  INPATIENT PROGRESS NOTE    Date of Admission: 4/25/2017  Length of Stay: 1  Primary Care Physician: Juan Rios MD    Subjective   CC: stump infection    HPI:  Leg not hurting currently.  No complaints exceptt wants to leave hospital.    Review Of Systems:   Review of Systems      Objective      Temp:  [97.9 °F (36.6 °C)-98.7 °F (37.1 °C)] 97.9 °F (36.6 °C)  Heart Rate:  [] 91  Resp:  [16-18] 16  BP: (153-192)/() 177/108  Physical Exam  Gen:  WD/WN man up in chair at lunch.  His wife is present for the entire conversation.  Neuro: alert and oriented, clear speech, follows commands, grossly nonfocal  HEENT:  NC/AT PERRL, OP benign  Neck:  Supple, no LAD  Heart RRR no murmur, rub, or gallop  Lungs cta nonlabored no w r r on room air  Abd:  Soft, nontender, no rebound or guarding, pos BS  Extrem:  No c/c/e.  The left leg is dressed at the stump; I didn't remove it  Psych:  He is calm and composed.  Denies suicidal ideation.  Denies intent for self-harm.  Denies addiction issues or withdrawal symptoms.  Repeats, \"I've just made up my mind.  I want to go.\"    Insight:  Asked if he understood what might happen if he goes home, he answered, \"I could die.\"  Added, \"I know this is a dumb decision but I've made up my mind.  I want to go.\"  I told him to stay at least another day and night and he answered, \"I hear you, but you can't force me to stay.\"      Results Review:    I have reviewed the labs, radiology results and diagnostic studies.      Results from last 7 days  Lab Units 04/26/17  0438   WBC 10*3/mm3 8.27   HEMOGLOBIN g/dL 11.6*   PLATELETS 10*3/mm3 187       Results from last 7 days  Lab Units 04/26/17  0914 04/26/17  0438   SODIUM mmol/L  --  132   POTASSIUM mmol/L  --  3.7   CHLORIDE mmol/L  --  99   TOTAL CO2 mmol/L  --  29.0   BUN mg/dL  --  12   CREATININE mg/dL  --  0.60   GLUCOSE mg/dL 346* 284*   CALCIUM mg/dL  --  9.3       Culture " Data: Cultures:    Blood Culture   Date Value Ref Range Status   04/25/2017 No growth at less than 24 hours  Preliminary   04/25/2017 No growth at less than 24 hours  Preliminary       Radiology Data:     I have reviewed the medications.    Assessment/Plan     Problem List  Hospital Problem List     * (Principal)Wound infection of left leg    DUNLAP Cirrhosis    Essential hypertension    Non-compliance    Hyperlipemia    Type 2 diabetes mellitus with circulatory disorder and uncontrolled    Dysthymia    History of MRSA infection           46 yr old 2 months out from BKA, fell on it a week ago, progressive cellulitis.  Cx done at [facility]. History of noncompliance with home IV abx.     Assessment/Plan:    Stump infection  -- IV abx  -- await cx  -- if he needs IV abx, this cant be done at home due to hist of noncompliance  -- I informed him that if he leaves against medical advice his infection is likely to worsen and could be life-threatneing.  He expressed understanding    Depression.  Poor judgment but acceptable insight.  -- denies SI or HI.    -- willing to take antidepressants:  I will prescribe paxil.  --thus far, hasn't been willing nto see an psychiatrist or counselor outpt but I encouraged him to do so.    -- supportive care      htn    dm2  -- 10.1 depite levemir, glimperide, tradjenta (unclear if he takes them)  -- sugars 300s  -- adjust insulin    MRSA history  DUNLAP cirrhosis    Hopefully he will choose to stay in hospital and let us care for him.  Wife was present for our conversation.     DVT prophylaxis:  Discharge Planning: I expect patient to be discharged to home or SNF in 3-5 days.    Melissa Issa MD   04/26/17   1:04 PM    Please note that portions of this note may have been completed with a voice recognition program. Efforts were made to edit the dictations, but occasionally words are mistranscribed.

## 2017-04-26 NOTE — CONSULTS
Cardiothoracic Surgery History & Physical           Chief complaint: Painful stump status post left BKA February 2017    HPI: 46-year-old well-known to this service secondary to recent left BKA.  He has been quite depressed at home stop taking his blood sugars and refused taking his insulin.  Recent hospitalization at [facility] his blood sugars were in the mid 400s.  He had a fall a week ago onto his stump and has had progressive pain since then.  Per the wife the patient has had a small opening with some cloudy drainage over the past week the patient saw Dr. Martinez in the office a week ago but believes the wound was not opened at that time.    Past Medical History:   Diagnosis Date   • Arthritis    • Cirrhosis    • Counseling for insulin pump    • Depression    • Diabetes mellitus    • Encephalopathy, hepatic    • H/O degenerative disc disease    • History of Lissa's gangrene    • Hyperlipemia    • Hypertension    • multiple Skin abscesses    • DUNLAP, bx showed stage IV fibrosis    • Neuropathy    • Peripheral vascular disease      Past Surgical History:   Procedure Laterality Date   • AMPUTATION DIGIT Left 1/30/2017    Procedure: left fourth and fifth transmetatarsal toe amputation ;  Surgeon: Juancho Martinez MD;  Location:  [facility];  Service:    • BELOW KNEE AMPUTATION Left 3/2/2017    Procedure: AMPUTATION BELOW KNEE, SHMUEL;  Surgeon: Juancho Martinez MD;  Location:  [facility];  Service:    • LEG SURGERY     • TESTICLE SURGERY      DEBRIDEMENT FROM GANGRENE     Family History   Problem Relation Age of Onset   • Arthritis Mother    • Hypertension Mother    • Kidney disease Mother    • Stroke Mother    • Heart attack Father    • Arthritis Father    • Diabetes Father    • Hyperlipidemia Father    • Hypertension Father    • Mental illness Sister    • Diabetes Brother    • Hypertension Brother    • Obesity Brother      Social History   Substance Use Topics   • Smoking status: Never Smoker   • Smokeless tobacco: Never  Used   • Alcohol use No     Occupation: Disabled  Lives in [place] with wife  Prescriptions Prior to Admission   Medication Sig Dispense Refill Last Dose   • acetaminophen (TYLENOL) 325 MG tablet Take 2 tablets by mouth Every 4 (Four) Hours As Needed for mild pain (1-3).  0 Taking   • amLODIPine (NORVASC) 5 MG tablet Take 5 mg by mouth 2 (Two) Times a Day.      • aspirin 81 MG EC tablet Take 1 tablet by mouth Daily.   Taking   • CloNIDine (CATAPRES) 0.2 MG tablet Take 0.2 mg by mouth 2 (Two) Times a Day.      • famotidine (PEPCID) 20 MG tablet Take 20 mg by mouth Daily.      • gabapentin (NEURONTIN) 400 MG capsule Take 3 capsules by mouth 3 (Three) Times a Day.   Taking   • glimepiride (AMARYL) 4 MG tablet Take 4 mg by mouth Every Morning Before Breakfast.      • HYDROcodone-acetaminophen (NORCO)  MG per tablet Take 1 tablet by mouth 2 (Two) Times a Day As Needed for Moderate Pain (4-6).      • insulin detemir (LEVEMIR) 100 UNIT/ML injection Inject 45 Units under the skin Daily.      • linagliptin (TRADJENTA) 5 MG tablet tablet Take 5 mg by mouth Daily With Breakfast.      • losartan (COZAAR) 100 MG tablet Take 1 tablet by mouth Daily. 30 tablet 0 Taking   • metoprolol succinate XL (TOPROL-XL) 25 MG 24 hr tablet Take 150 mg by mouth 2 (Two) Times a Day.      • probiotic (CULTURELLE) capsule capsule Take 1 capsule by mouth Daily. 30 capsule  Taking     Allergies:  No known drug allergy    Review of Systems:    A comprehensive review of systems was negative except for:   Constitutional: positive for anorexia and fatigue  Respiratory: positive for none  Cardiovascular: positive for fatigue and weight gain  Gastrointestinal: positive for none  Hematologic/lymphatic: positive for easy bruising  Musculoskeletal: positive for arthralgias, muscle weakness and stiff joints  Neurological: positive for coordination problems and weakness  Allergic/Immunologic: positive for None  All other systems were reviewed and are  negative.    Vital Signs:  Temp:  [98.1 °F (36.7 °C)-98.7 °F (37.1 °C)] 98.2 °F (36.8 °C)  Heart Rate:  [] 85  Resp:  [16-18] 18  BP: (153-192)/() 175/109    Physical Exam: 46-year-old appearing stated age quick to tears during much of this interview.  He is afebrile vital signs are stable.  Pupils are equal round reactive extraocular movements are intact.  Tongue is midline mucosa pink and moist pharynx is negative.  Neck is supple no lymphadenopathy no carotid bruits are auscultated.  Trachea midline.  Lungs are clear auscultation bilaterally to the bases  Cardiovascular has an S1 and S2 without rubs murmurs or gallops  GI's abdomen soft bowel sounds present in all 4 quadrants, no organomegaly is noted no tenderness to deep palpation.  Pulses are 2+ symmetrical both upper extremities 2+ in the right lower extremity good, popliteal pulse on the left lower extremity.  The BKA site itself has just been redressed by wound care and infectious disease I did not unwrap.    Physical Exam    Labs:    Results from last 7 days  Lab Units 04/26/17  0438   WBC 10*3/mm3 8.27   HEMOGLOBIN g/dL 11.6*   HEMATOCRIT % 35.5*   PLATELETS 10*3/mm3 187       Results from last 7 days  Lab Units 04/26/17  0914 04/26/17  0438   SODIUM mmol/L  --  132   POTASSIUM mmol/L  --  3.7   CHLORIDE mmol/L  --  99   TOTAL CO2 mmol/L  --  29.0   BUN mg/dL  --  12   CREATININE mg/dL  --  0.60   GLUCOSE mg/dL 346* 284*   CALCIUM mg/dL  --  9.3         Coagulation: No results found for: INR, APTT  Cardiac markers:     ABGs:       Invalid input(s): PO2, PCO2    Imaging: None    Assessment: Trauma to left BKA with a small amount of opening and drainage.  Depression  Recent osteomyelitis  Recent left BKA  History of cirrhosis  Peripheral vascular disease  Hypertension    Plan: He is wanting to leave the hospital now.  He is presently on IV antibiotics he has been talked by infectious disease to be switched over to by mouth antibiotics.  We will  follow with you, Dr. Martinez will see later today.      LAUREN Watson  04/26/17  10:16 AM

## 2017-04-26 NOTE — PAYOR COMM NOTE
"Case Management  [phone]    Elliot Crystal (46 y.o. Male)     Date of Birth Social Security Number Address Home Phone MRN    1970  [address] [place], [place] [address] [phone] [id number]    [employer] Marital Status          [employer]odaism        Admission Date Admission Type Admitting Provider Attending Provider Department, Room/Bed    4/25/17 Emergency Melissa Issa MD Mini, Jocelyn, MD [facility] 3E, S342/1    Discharge Date Discharge Disposition Discharge Destination                      Attending Provider: Melissa Issa MD     Allergies:  No Known Drug Allergy    Isolation:  None   Infection:  MRSA (03/03/17)   Code Status:  FULL    Ht:  74\" (188 cm)   Wt:  244 lb (111 kg)    Admission Cmt:  None   Principal Problem:  Wound infection of left leg [T14.8,L08.9]                 Active Insurance as of 4/25/2017     Primary Coverage     Payor Plan Insurance Group Employer/Plan Group    [employer] [place] [place]     Payor Plan Address Payor Plan Phone Number Effective From Effective To    PO  [phone] 1/18/2017     [place] [address]       Subscriber Name Subscriber Birth Date Member ID       ELLIOT CRYSTAL 1970 [id number]                 Emergency Contacts      (Rel.) Home Phone Work Phone Mobile Phone    Cindy Crystal (Spouse) [phone] -- [phone]               History & Physical      Delon Payne MD at 4/25/2017  6:04 PM              [facility] Medicine Services  HISTORY AND PHYSICAL    Primary Care Physician: Juan Rios MD    Subjective     Chief Complaint:  Wound Infection, Failed Outpatient Therapy    History of Present Illness:     PT is a 46 Y.O.M with a PMH significant for arthritis, depression, diabetes, degenerative disc disease, hyperlipidemia, hypertension, and peripheral vascular disease.  Patient is seen in the ED today for a wound infection.  Patient had a left BKA by Dr. Martinez on " "March 2, 2017.  His wife provides most of the history.  One week ago his wife reports that he was seen by Dr. Martinez for evaluation after he fell onto his stump site opening the incision.  He been receiving outpatient treatment.  Roughly 4 days ago he noticed odorous drainage, redness, and pain with associated fatigue and nausea.  Originally the patient presented to [facility] ED last night, however he did not want to wait to be seen by a physician so he then presented to [facility] ED. CT scan as well as wound cultures completed at [facility].  He denies any recent fevers, vomiting or diarrhea.  Denies any abdominal pain.  No change in bowel pattern. His wife states that he has complained of some difficulty urinating.     Of note his wife reports that he has been \"extremely depressed\" for the past month. She reports at the beginning of April that he quit taking all of his medications including his insulin. She reports that he would not check his glucose levels either. During his examination and interview he was extremely tearful. He states that he is \"tired of being sick and just wants to go home\". He has an appointment with [facility], however he has been unable to be seen yet.     ED workup reveals blood glucose of 446, potassium 4.0 creatinine 0.70, CRP 1.38 and sedimentation rate 45, WBC 7.94.  CT scan requested from [facility] ED.  Wound culture from [facility] currently growing gram-negative rods. He will be admitted to Hospital medicine Services at this time for further evaluation and treatment.     Review of Systems   Constitutional: Positive for activity change, chills and fatigue. Negative for appetite change and fever.   HENT: Negative.    Eyes: Negative.    Respiratory: Negative.    Cardiovascular: Negative.    Gastrointestinal: Positive for nausea. Negative for abdominal distention, abdominal pain, blood in stool, diarrhea and vomiting.   Genitourinary: " Positive for difficulty urinating. Negative for dysuria, hematuria and urgency.   Musculoskeletal: Negative.    Skin: Positive for pallor and wound. Negative for color change and rash.   Neurological: Negative.    Psychiatric/Behavioral: Positive for behavioral problems, dysphoric mood and sleep disturbance. Negative for self-injury and suicidal ideas.   Otherwise complete ROS performed and negative except as mentioned in the HPI.    Past Medical History:   Diagnosis Date   • Arthritis    • Cirrhosis    • Counseling for insulin pump    • Depression    • Diabetes mellitus    • Encephalopathy, hepatic    • H/O degenerative disc disease    • History of Lissa's gangrene    • Hyperlipemia    • Hypertension    • multiple Skin abscesses    • DUNLAP, bx showed stage IV fibrosis    • Neuropathy    • Peripheral vascular disease        Past Surgical History:   Procedure Laterality Date   • AMPUTATION DIGIT Left 1/30/2017    Procedure: left fourth and fifth transmetatarsal toe amputation ;  Surgeon: Juancho Martinez MD;  Location:  [facility];  Service:    • BELOW KNEE AMPUTATION Left 3/2/2017    Procedure: AMPUTATION BELOW KNEE, SHMUEL;  Surgeon: Juancho Martinez MD;  Location:  [facility];  Service:    • LEG SURGERY     • TESTICLE SURGERY      DEBRIDEMENT FROM GANGRENE       Family History   Problem Relation Age of Onset   • Arthritis Mother    • Hypertension Mother    • Kidney disease Mother    • Stroke Mother    • Heart attack Father    • Arthritis Father    • Diabetes Father    • Hyperlipidemia Father    • Hypertension Father    • Mental illness Sister    • Diabetes Brother    • Hypertension Brother    • Obesity Brother        Social History     Social History   • Marital status:      Spouse name: N/A   • Number of children: 1   • Years of education: N/A     Occupational History   • Security      Disabled-Diabetic Retinopathy     Social History Main Topics   • Smoking status: Never Smoker   • Smokeless tobacco: Never Used   •  "Alcohol use No   • Drug use: No   • Sexual activity: Not on file     Medications:  Reviewed on Admission and reconciled.    Allergies:  Allergies   Allergen Reactions   • No Known Drug Allergy      Objective     Physical Exam:  Vital Signs: BP (!) 179/102  Pulse 90  Temp 98.1 °F (36.7 °C) (Oral)   Resp 16  Ht 74\" (188 cm)  Wt 244 lb (111 kg)  SpO2 97%  BMI 31.33 kg/m2  Physical Exam   Constitutional: He is oriented to person, place, and time. He appears well-developed and well-nourished. He is cooperative.   HENT:   Head: Normocephalic and atraumatic.   Right Ear: Hearing and external ear normal.   Left Ear: Hearing and external ear normal.   Nose: Nose normal.   Mouth/Throat: Uvula is midline, oropharynx is clear and moist and mucous membranes are normal.   Eyes: Conjunctivae and EOM are normal. Pupils are equal, round, and reactive to light. No scleral icterus.   Neck: Trachea normal and normal range of motion. Neck supple. No JVD present. Carotid bruit is not present. No thyromegaly present.   Cardiovascular: Normal rate, regular rhythm, normal heart sounds and intact distal pulses.    Pulmonary/Chest: Effort normal and breath sounds normal.   Abdominal: Soft. Bowel sounds are normal. There is no hepatosplenomegaly. There is no tenderness.   Musculoskeletal: Normal range of motion.   Lymphadenopathy:        Left: No inguinal adenopathy present.   Neurological: He is alert and oriented to person, place, and time. He has normal strength. A sensory deficit is present.   Skin: Skin is warm and dry.        Wound dressing with ace bandage to left lower extremity   Psychiatric: His behavior is normal. Judgment normal. Cognition and memory are normal. He exhibits a depressed mood. He expresses no suicidal ideation. He is noncommunicative.   His wife provides the historical narrative.  He demonstrates poor eye contact.   Nursing note and vitals reviewed.    Results Reviewed:    Results from last 7 days  Lab Units " 04/25/17  1710   WBC 10*3/mm3 7.94   HEMOGLOBIN g/dL 11.4*   PLATELETS 10*3/mm3 207       Results from last 7 days  Lab Units 04/25/17  1710   SODIUM mmol/L 132   POTASSIUM mmol/L 4.0   TOTAL CO2 mmol/L 29.0   CREATININE mg/dL 0.70   GLUCOSE mg/dL 446*   CALCIUM mg/dL 9.1     I have personally reviewed and interpreted available lab data, radiology studies and ECG obtained at time of admission.     Assessment / Plan     Assessment/Problem List:   Hospital Problem List     * (Principal)Wound infection of left leg    Type 2 diabetes mellitus with circulatory disorder and uncontrolled    History of MRSA infection    DUNLAP Cirrhosis    Essential hypertension    Non-compliance    Hyperlipemia    Dysthymia        Plan:  - admit to telemetry  - CT surgery consult to Dr. Martinez in AM    - ID consult in AM, for now continue vancomycin and rocephin   - ACHS with SSI with am labs ordered  - Wound culture ordered   - CT Scan media requested from [facility] ED  - WOC to see in AM, continue home ordered wet to dry dressings   - urinalysis ordered  - monitor HTN, decreased home cozaar to 50mg, continue home amlodipine and metoprolol, HOLD HOME CLONIDINE    - gentle IV hydration  - consult spiritual care  - Zoloft 50 mg po once daily    DVT prophylaxis: subq heparin & TEDS/SCDS  Code Status: FULL   Admission Status: Patient will be admitted to [facility].  I believe this patient meets INPATIENT status due to the need for care which can only be reasonably provided in an hospital setting such as aggressive/expedited ancillary services and/or consultation services and the necessity for IV medications, close physician monitoring and/or the possible need for procedures.  In such, I feel patient’s risk for adverse outcomes and need for care warrant INPATIENT evaluation and predict the patient’s care encounter to likely last beyond 2 midnights.  I have seen and examined the patient, performing an independent face-to-face diagnostic  evaluation with plan of care reviewed and developed with the advanced practice clinician (APC). I have addended and modified the above history of present illness, physical examination, and assessment and plan to reflect my findings and impressions. See above for further detailed assessment and plan developed with APC which I have reviewed and/or edited.     Delon Payne MD 04/25/17 8:25 PM           Electronically signed by Delon Payne MD at 4/25/2017  8:26 PM        Hospital Medications (active)       Dose Frequency Start End    ! Vancomycin trough  Once 4/27/2017     Sig - Route: 1 (One) Time. - Does not apply    acetaminophen (TYLENOL) tablet 650 mg 650 mg Every 4 Hours PRN 4/25/2017     Sig - Route: Take 2 tablets by mouth Every 4 (Four) Hours As Needed for Mild Pain (1-3). - Oral    amLODIPine (NORVASC) tablet 5 mg 5 mg Every 24 Hours Scheduled 4/26/2017     Sig - Route: Take 1 tablet by mouth Daily. - Oral    cefTRIAXone (ROCEPHIN) IVPB 1 g 1 g Once 4/25/2017 4/25/2017    Sig - Route: Infuse 50 mL into a venous catheter 1 (One) Time. - Intravenous    dextrose (D50W) solution 25 g 25 g Every 15 Minutes PRN 4/25/2017     Sig - Route: Infuse 50 mL into a venous catheter Every 15 (Fifteen) Minutes As Needed for Low Blood Sugar (Blood Sugar Less Than 70, Patient Has IV Access - Unresponsive, NPO or Unable To Safely Swallow). - Intravenous    dextrose (GLUTOSE) oral gel 15 g 15 g Every 15 Minutes PRN 4/25/2017     Sig - Route: Take 15 g by mouth Every 15 (Fifteen) Minutes As Needed for Low Blood Sugar (Blood Sugar Less Than 70, Patient Alert, Is Not NPO & Can Safely Swallow). - Oral    glucagon (GLUCAGEN) injection 1 mg 1 mg Every 15 Minutes PRN 4/25/2017     Sig - Route: Inject 1 mg under the skin Every 15 (Fifteen) Minutes As Needed (Blood Glucose Less Than 70 - Patient Without IV Access - Unresponsive, NPO or Unable To Safely Swallow). - Subcutaneous    heparin (porcine) 5000 UNIT/ML injection 5,000 Units  "5,000 Units Every 8 Hours Scheduled 4/25/2017     Sig - Route: Inject 1 mL under the skin Every 8 (Eight) Hours. - Subcutaneous    insulin lispro (humaLOG) injection 0-7 Units 0-7 Units 4 Times Daily With Meals & Nightly 4/25/2017     Sig - Route: Inject 0-7 Units under the skin 4 (Four) Times a Day With Meals & at Bedtime. - Subcutaneous    insulin regular (humuLIN R,novoLIN R) injection 15 Units 15 Units Once 4/25/2017 4/25/2017    Sig - Route: Inject 15 Units under the skin 1 (One) Time. - Subcutaneous    losartan (COZAAR) tablet 50 mg 50 mg Every 24 Hours Scheduled 4/26/2017     Sig - Route: Take 1 tablet by mouth Daily. - Oral    metoprolol succinate XL (TOPROL-XL) 24 hr tablet 25 mg 25 mg Every 24 Hours Scheduled 4/26/2017     Sig - Route: Take 1 tablet by mouth Daily. - Oral    ondansetron (ZOFRAN) injection 4 mg 4 mg Every 6 Hours PRN 4/25/2017     Sig - Route: Infuse 2 mL into a venous catheter Every 6 (Six) Hours As Needed for Nausea or Vomiting. - Intravenous    Linked Group 1:  \"Or\" Linked Group Details        ondansetron (ZOFRAN) tablet 4 mg 4 mg Every 6 Hours PRN 4/25/2017     Sig - Route: Take 1 tablet by mouth Every 6 (Six) Hours As Needed for Nausea or Vomiting. - Oral    Linked Group 1:  \"Or\" Linked Group Details        pantoprazole (PROTONIX) EC tablet 40 mg 40 mg Every Early Morning 4/26/2017     Sig - Route: Take 1 tablet by mouth Every Morning. - Oral    Pharmacy to dose vancomycin  Continuous PRN 4/25/2017     Sig - Route: Continuous As Needed for Consult. - Does not apply    piperacillin-tazobactam (ZOSYN) 3.375 g/100 mL 0.9% NS IVPB (mbp) 3.375 g Every 6 Hours 4/26/2017     Sig - Route: Infuse 100 mL into a venous catheter Every 6 (Six) Hours. - Intravenous    sertraline (ZOLOFT) tablet 50 mg 50 mg Daily 4/26/2017     Sig - Route: Take 1 tablet by mouth Daily. - Oral    sodium chloride 0.9 % flush 1-10 mL 1-10 mL As Needed 4/25/2017     Sig - Route: Infuse 1-10 mL into a venous catheter As " "Needed for Line Care. - Intravenous    sodium chloride 0.9 % flush 10 mL 10 mL As Needed 4/25/2017     Sig - Route: Infuse 10 mL into a venous catheter As Needed for Line Care. - Intravenous    Linked Group 2:  \"And\" Linked Group Details        sodium chloride 0.9 % infusion 50 mL/hr Continuous 4/25/2017 4/26/2017    Sig - Route: Infuse 50 mL/hr into a venous catheter Continuous. - Intravenous    vancomycin 1500 mg/500 mL 0.9% NS IVPB ([facility]) 1,500 mg Every 12 Hours 4/26/2017     Sig - Route: Infuse 500 mL into a venous catheter Every 12 (Twelve) Hours. - Intravenous    vancomycin IVPB 2000 mg in 0.9% Sodium Chloride (premix) 500 mL 2,000 mg Once 4/25/2017 4/25/2017    Sig - Route: Infuse 500 mL into a venous catheter 1 (One) Time. - Intravenous    cefTRIAXone (ROCEPHIN) IVPB 1 g (Discontinued) 1 g Every 24 Hours 4/26/2017 4/26/2017    Sig - Route: Infuse 50 mL into a venous catheter Daily. - Intravenous    Notes to Pharmacy: Please see ED administration time for scheduling    enoxaparin (LOVENOX) syringe 40 mg (Discontinued) 40 mg Daily 4/25/2017 4/25/2017    Sig - Route: Inject 0.4 mL under the skin Daily. - Subcutaneous          Physician Progress Notes (last 72 hours) (Notes from 4/23/2017  9:38 AM through 4/26/2017  9:38 AM)     No notes of this type exist for this encounter.        "

## 2017-04-26 NOTE — PROGRESS NOTES
Continued Stay Note  [facility]     Patient Name: Kendrick Crystal  MRN: [id number]  Today's Date: 4/26/2017    Admit Date: 4/25/2017          Discharge Plan       04/26/17 1511    Case Management/Social Work Plan    Plan Social work provided a list of mental health providers to the patient to assist him in getting information about couselors that accept [employer].    Patient/Family In Agreement With Plan yes      04/26/17 1229    Case Management/Social Work Plan    Plan Home with home health    Patient/Family In Agreement With Plan yes    Additional Comments Met with patient and his wife, Cindy, in the room to initiate discharge planning. Patient lives with his wife in a single-story apartment in [facility]. The apartment is accessible via a ramp. Patient is independent with ADLs most of the time, and he uses a walker or wheelchair for mobility. Patient is current with [facility] for PT/OT and skilled nursing and will need an order to resume HH at [facility]. RITU confirmed with [facility] that he is being followed for the left BKA but asked that nursing follow his blood sugars and any other nursing care he might require as well. Per his wife, patient has been very depressed and was withdrawn and weepy in the room. CM has consulted the  to make recommendations regarding patient's follow up care for depression and adjustment to the BKA. CM will continue to follow for needs.                Discharge Codes     None        Expected Discharge Date and Time     Expected Discharge Date Expected Discharge Time    Apr 28, 2017             JOHANNA Townsend

## 2017-04-26 NOTE — CONSULTS
Kendrick Crystal  1970  [id number]    Date of Consult: 4/26/2017 4/25/2017      Requesting Provider: No ref. provider found  Evaluating Physician: Usman Dong MD    Chief Complaint: left stump infection/dehiscence    Reason for Consultation:left stump infection    History of present illness:    Patient is a 46 y.o.  Yr old male with history of uncontrolled diabetes with extensive comorbidity as previously outlined. He has lower extremity vascular disease but no specific option for revascularization per discussion with Dr. Martinez. He was admitted January 2017 with left foot infection, MRI not confirming osteomyelitis, but had surgery on January 30 with metatarsals 3/4/5 amputated, marginal perfusion per operative note and MRSA/GB strep/coag-negative staph in his various cultures. He was transferred to [facility] on broad-spectrum IV antibiotics. While at [facility], he was under the care of Dr. Herrera and, per family/patient, he was told that he required hyperbaric oxygen therapy and had very little chance for long-term healing at the foot. He was readmitted on February 13 to [facility] with patient initially interested in having higher level amputation. After discussion with Dr. Martinez, he changed his mind and wanted attempt at salvage again with IV antibiotics/wound care. He was discharged February 17 as outlined in our prior inpatient notes. He was noncompliant with follow-up in the office the week of February 20 despite our calls to the patient. He then stopped antibiotics on his own Wednesday, February 22 and did not notify us. His family was able to get him to come into the office on February 27 at which point he refused IV antibiotics/insisted PICC line be removed and he refused hospitalization/VAC, understanding the risks of his behavior/decisions as outlined in my office note. He was agreeable for prescription of oral agents and went home to consider his options. On February 28, he called  and said he was willing to come to the hospital. He presented to the emergency room, found to have fever/leukocytosis consistent with sepsis and subsequently found to be bacteremic with MRSA.  He underwent left BKA, transitioned once again to IV antibiotics at home associated with reassurance by patient/wife that he would be compliant.  He wants a can became noncompliant and insistent on stopping antibiotics.  He reports having fallen onto the left stump, out of bed approximately 2 weeks prior to his April admission.  This has since become malodorous with increased drainage/pain and some redness.  He initially presented to [facility] on April 25 but he refused to stay and wait for a physician to see him.  He went to [facility] where he had at least some culture performed with gram-negative rods by verbal report from the wound.  In addition she reports having undergone CT, but the results of this and the culture data is pending.  Empiric broad-spectrum IV antibiotics initiated and transferred to [facility].  At my initial encounter, he is threatening to leave A.    He reports pain in the left stump which is constant, nonradiating, sharp, worse with palpation, associated with dehiscence/drainage and odor as above.  Pain currently 5 date out of 10 in severity depending on how much pressures being put on it.  He denies any other specific trauma.  No penetrating trauma.  No fresh/brackish/salt water exposure.  No animal insect or arthropod bite.  No history of VRE or ESBL/K PC organisms.  No history see if.    He denies headache photophobia or neck stiffness.  No short of breath cough or hemoptysis.  No dysuria hematuria or pyuria.  No nausea vomiting diarrhea or abdominal pain.    Past Medical History:   Diagnosis Date   • Arthritis    • Cirrhosis    • Counseling for insulin pump    • Depression    • Diabetes mellitus    • Encephalopathy, hepatic    • H/O degenerative disc  disease    • History of Lissa's gangrene    • Hyperlipemia    • Hypertension    • multiple Skin abscesses    • DUNLAP, bx showed stage IV fibrosis    • Neuropathy    • Peripheral vascular disease        Past Surgical History:   Procedure Laterality Date   • AMPUTATION DIGIT Left 1/30/2017    Procedure: left fourth and fifth transmetatarsal toe amputation ;  Surgeon: Juancho Martinez MD;  Location:  [facility];  Service:    • BELOW KNEE AMPUTATION Left 3/2/2017    Procedure: AMPUTATION BELOW KNEE, SHMUEL;  Surgeon: Juancho Martinez MD;  Location:  [facility];  Service:    • LEG SURGERY     • TESTICLE SURGERY      DEBRIDEMENT FROM GANGRENE       Pediatric History   Patient Guardian Status   • Not on file.     Other Topics Concern   • Not on file     Social History Narrative   History of tobacco, but denies alcohol or illicit drugs    family history includes Arthritis in his father and mother; Diabetes in his brother and father; Heart attack in his father; Hyperlipidemia in his father; Hypertension in his brother, father, and mother; Kidney disease in his mother; Mental illness in his sister; Obesity in his brother; Stroke in his mother.    Allergies   Allergen Reactions   • No Known Drug Allergy        Medication:  Current Facility-Administered Medications   Medication Dose Route Frequency Provider Last Rate Last Dose   • [START ON 4/27/2017] ! Vancomycin trough   Does not apply Once Latha Lenz MD       • acetaminophen (TYLENOL) tablet 650 mg  650 mg Oral Q4H PRN Latha Lenz MD       • amLODIPine (NORVASC) tablet 5 mg  5 mg Oral Q24H Delon Payne MD       • dextrose (D50W) solution 25 g  25 g Intravenous Q15 Min PRN Latha Lenz MD       • dextrose (GLUTOSE) oral gel 15 g  15 g Oral Q15 Min PRN Latha Lenz MD       • glucagon (GLUCAGEN) injection 1 mg  1 mg Subcutaneous Q15 Min PRN Latha Lenz MD       • heparin (porcine) 5000 UNIT/ML injection 5,000 Units  5,000 Units  Subcutaneous Q8H Latha Lenz MD   5,000 Units at 04/26/17 0626   • insulin lispro (humaLOG) injection 0-7 Units  0-7 Units Subcutaneous 4x Daily With Meals & Nightly Latha Lenz MD   6 Units at 04/25/17 2118   • losartan (COZAAR) tablet 50 mg  50 mg Oral Q24H Delon Payne MD       • metoprolol succinate XL (TOPROL-XL) 24 hr tablet 25 mg  25 mg Oral Q24H Delon Payne MD       • ondansetron (ZOFRAN) tablet 4 mg  4 mg Oral Q6H PRN Latha Lenz MD        Or   • ondansetron (ZOFRAN) injection 4 mg  4 mg Intravenous Q6H PRN Latha Lenz MD       • pantoprazole (PROTONIX) EC tablet 40 mg  40 mg Oral Q AM Delon Payne MD   40 mg at 04/26/17 0627   • Pharmacy to dose vancomycin   Does not apply Continuous PRN Latha Lenz MD       • piperacillin-tazobactam (ZOSYN) 3.375 g/100 mL 0.9% NS IVPB (mbp)  3.375 g Intravenous Q6H Usman Dong MD       • sertraline (ZOLOFT) tablet 50 mg  50 mg Oral Daily Delon Payne MD       • sodium chloride 0.9 % flush 1-10 mL  1-10 mL Intravenous PRN Latha Lenz MD       • sodium chloride 0.9 % flush 10 mL  10 mL Intravenous PRN Patrick Villanueva MD   10 mL at 04/25/17 1819   • sodium chloride 0.9 % infusion  50 mL/hr Intravenous Continuous Delon Payne MD 50 mL/hr at 04/25/17 2118 50 mL/hr at 04/25/17 2118   • vancomycin 1500 mg/500 mL 0.9% NS IVPB ([facility])  1,500 mg Intravenous Q12H Latha Lenz MD   1,500 mg at 04/26/17 0626       Antibiotics:  IV Anti-Infectives     Ordered     Dose/Rate Route Frequency Start Stop    04/26/17 0713  piperacillin-tazobactam (ZOSYN) 3.375 g/100 mL 0.9% NS IVPB (mbp)     Ordering Provider:  Usman Dong MD    3.375 g Intravenous Every 6 Hours 04/26/17 0800      04/25/17 2022  vancomycin 1500 mg/500 mL 0.9% NS IVPB ([facility])     Ordering Provider:  Latha Lenz MD    1,500 mg  over 90 Minutes Intravenous Every 12 Hours 04/26/17 0600      04/25/17 1950  Pharmacy to dose vancomycin    "  Ordering Provider:  Latha Lenz MD     Does not apply Continuous PRN 04/25/17 1950      04/25/17 1704  vancomycin IVPB 2000 mg in 0.9% Sodium Chloride (premix) 500 mL     Ordering Provider:  Patrick Villanueva MD    2,000 mg  over 2 Hours Intravenous Once 04/25/17 1706 04/25/17 2022 04/25/17 1704  cefTRIAXone (ROCEPHIN) IVPB 1 g     Ordering Provider:  Patrick Villanueva MD    1 g  over 30 Minutes Intravenous Once 04/25/17 1706 04/25/17 1815            Review of Systems    Constitutional-- No Fever, chills or sweats.  Appetite good, and no malaise. No fatigue.  Heent-- No new vision, hearing or throat complaints.  No epistaxis or oral sores.  Denies odynophagia or dysphagia.  No flashers, floaters or eye pain. No odynophagia or dysphagia. No headache, photophobia or neck stiffness.  CV-- No chest pain, palpitation or syncope  Resp-- No SOB/cough/Hemoptysis  GI- No nausea, vomiting, or diarrhea.  No hematochezia, melena, or hematemesis. Denies jaundice or chronic liver disease.  -- No dysuria, hematuria, or flank pain.  Denies hesitancy, urgency or flank pain.  Lymph- no swollen lymph nodes in neck/axilla or groin.   Heme- No active bruising or bleeding; no Hx of DVT or PE.  MS-- no swelling or pain in the bones or joints of arms/legs Aside from above.  No new back pain.  Neuro-- No acute focal weakness or numbness in the arms or legs.  No seizures.    Full 12 point review of systems reviewed and negative otherwise for acute complaints, except for above    Physical Exam:   Vital Signs   BP (!) 177/103 (BP Location: Right arm, Patient Position: Lying)  Pulse 85  Temp 98.2 °F (36.8 °C) (Oral)   Resp 18  Ht 74\" (188 cm)  Wt 244 lb (111 kg)  SpO2 98%  BMI 31.33 kg/m2    GENERAL: Awake and alert, in no acute distress.  Acts irritated  HEENT: Normocephalic, atraumatic.  PERRL. EOMI. No conjunctival injection. No icterus. Oropharynx clear without evidence of thrush or exudate. No evidence of peridontal " disease.    NECK: Supple without nuchal rigidity. No mass.  LYMPH: No cervical, axillary or inguinal lymphadenopathy.  HEART: RRR; No murmur, rubs, gallops.   LUNGS: Clear to auscultation bilaterally without wheezing, rales, rhonchi. Normal respiratory effort. Nonlabored. No dullness.  ABDOMEN: Soft, nontender, nondistended. Positive bowel sounds. No rebound or guarding. NO mass or HSM.  EXT:  No cyanosis, clubbing or edema. No cord.  : Genitalia generally unremarkable.  Without Walker catheter.  MSK: FROM without joint effusions noted arms/legs.    SKIN: Warm and dry without cutaneous eruptions on Inspection/palpation aside from below.    NEURO: Oriented to PPT. No focal deficits on motor/sensory exam at arms/legs.    Left stump is dehisced.  The lateral side seems to have tunneling although I'm unable to probe to bone.  Some dusky fat in the base with slight serous sanguinous drainage.  Faint erythema in the surrounding soft tissue but no discrete mass bulge or fluctuance.  No crepitus or bulla.  Along the length of his dehisced wound, I am unable to probe to bone.  No ascending lymphangitis.      Laboratory Data      Results from last 7 days  Lab Units 04/26/17  0438 04/25/17  1710   WBC 10*3/mm3 8.27 7.94   HEMOGLOBIN g/dL 11.6* 11.4*   HEMATOCRIT % 35.5* 34.7*   PLATELETS 10*3/mm3 187 207       Results from last 7 days  Lab Units 04/26/17  0438   SODIUM mmol/L 132   POTASSIUM mmol/L 3.7   CHLORIDE mmol/L 99   TOTAL CO2 mmol/L 29.0   BUN mg/dL 12   CREATININE mg/dL 0.60   GLUCOSE mg/dL 284*   CALCIUM mg/dL 9.3       Results from last 7 days  Lab Units 04/25/17  1710   ALK PHOS U/L 122*   BILIRUBIN mg/dL 0.2*   ALT (SGPT) U/L 17   AST (SGOT) U/L 16       Results from last 7 days  Lab Units 04/25/17  1710   SED RATE mm/hr 45*       Results from last 7 days  Lab Units 04/25/17  1710   CRP mg/dL 1.38*       Estimated Creatinine Clearance: 199.5 mL/min (by C-G formula based on Cr of  0.6).      Microbiology:      Radiology:  Imaging Results (last 72 hours)     ** No results found for the last 72 hours. **            Impression:  --Acute left stump wound infection with recent dehiscence after fall and deterioration despite attempts at Gen. supportive measures at home.  I'm unable to confirm bone tendon or joint infection at present, but certainly could evolve.  Retrieve data from outside hospital including scans/cultures.  Empiric broad-spectrum antibiotics initiated with vancomycin/Zosyn.  Adjust antibiotics depending on clinical course/study results and response to therapy.  He verbalizes understanding that antibiotics/wound care are not a guarantee for cure and that he runs a risk for further serious morbidity and other serious sequela including functional/limb loss, possibility for further surgical debridement and revision in addition to chronic nonhealing wound/pain/recurrent infection etc.  His compliance is been a direct barrier to his care and is a direct barrier to his chances for improvement.  I have told him this directly.    --Fall from bed with dehiscence of left stump    --History MRSA bacteremia as above.  Treated with prior SHMUEL negative for endocarditis.  No new stigmata to suggest any new metastatic focus of involvement at present    --Diabetes mellitus.  Previously uncontrolled and current level of control unknown.  You need to tightly control blood sugar to give best chance for healing    --Medical noncompliance as above.  He is once again threatening to leave AGAINST MEDICAL ADVICE.  I discussed with him directly the impact of his behavior and decisions have had on his health in the risks he faces including serious morbidity and other serious sequela/mortality, etc.  I discussed directly with Bam Issa/Michelle.  Dr. Issa will assess his ability to make medical decisions for himself.    --Depression.  Per internal medicine.  Dr. Issa will assess severity and any impact this  might have on his medical decision making/clinical course, etc.    --History cirrhosis. DUNLAP     PLAN: Thank you for asking us to see Kendrick Crytsal, I recommend the following:    --IV vancomycin/Zosyn    --I discussed potential risks and benefits of the prescribed antibiotics that include, but are not limited to, solid organ toxicity, neuro/bala/vestibular toxicity, renal toxicity, CDiff, cytopenias, hypersensitivity,  etc.. Patient/Family voice understanding and agree to proceed.    --Monitor IV and IV antibiotics with risk for systemic complication and potential drug interaction    --Check/review labs cultures and scans    --Highly complex set of issues with high risk for further serious morbidity and other serious sequela\    --Discussed with Drs. as outlined above       Usman Dnog MD  4/26/2017

## 2017-04-26 NOTE — PLAN OF CARE
Problem: Patient Care Overview (Adult)  Goal: Plan of Care Review    04/25/17 5045   Coping/Psychosocial Response Interventions   Plan Of Care Reviewed With patient;spouse   Patient Care Overview   Progress no change   Outcome Evaluation   Outcome Summary/Follow up Plan pt is indifferent to care, and wants to leave       Goal: Adult Individualization and Mutuality  Outcome: Ongoing (interventions implemented as appropriate)    Problem: Infection, Risk/Actual (Adult)  Goal: Infection Prevention/Resolution  Outcome: Ongoing (interventions implemented as appropriate)

## 2017-04-26 NOTE — PROGRESS NOTES
Discharge Planning Assessment  [facility]     Patient Name: Kendrick Crystal  MRN: [id number]  Today's Date: 4/26/2017    Admit Date: 4/25/2017          Discharge Needs Assessment       04/26/17 1223    Living Environment    Lives With spouse    Living Arrangements apartment    Provides Primary Care For no one    Quality Of Family Relationships supportive;helpful;involved    Able to Return to Prior Living Arrangements yes    Discharge Needs Assessment    Concerns To Be Addressed discharge planning concerns;adjustment to diagnosis/illness concerns    Readmission Within The Last 30 Days no previous admission in last 30 days    Outpatient/Agency/Support Group Needs homecare agency (specify level of care)   PT/OT, skilled nursing for left BKA    Community Agency Name(S) [facility], . [phone], fax [phone]    Equipment Currently Used at Home cane, straight;commode;hospital bed;raised toilet;shower chair;walker, rolling;wheelchair    Transportation Available car;family or friend will provide    Discharge Disposition still a patient    Discharge Contact Information if Applicable Cindy Crystal, wife, [phone]            Discharge Plan       04/26/17 1227    Case Management/Social Work Plan    Plan Home with home health    Patient/Family In Agreement With Plan yes    Additional Comments Met with patient and his wife, Cindy, in the room to initiate discharge planning. Patient lives with his wife in a single-story apartment in [facility]. The apartment is accessible via a ramp. Patient is independent with ADLs most of the time, and he uses a walker or wheelchair for mobility. Patient is current with [facility] for PT/OT and skilled nursing and will need an order to resume HH at [facility]. CM confirmed with Princess that he is being followed by [facility] for the left BKA but asked that nursing follow his blood sugars and any other nursing care he might require as well. Per his wife, patient has  been very depressed, and he was withdrawn and weepy in the room. CM has consulted the  to make recommendations regarding patient's follow up care for depression and adjustment to the BKA. CM will continue to follow for needs.          Discharge Placement     No information found        Expected Discharge Date and Time     Expected Discharge Date Expected Discharge Time    Apr 28, 2017               Demographic Summary       04/26/17 1219    Referral Information    Referral Source admission list    Reason For Consult discharge planning    Record Reviewed history and physical;medical record;clinical discipline documentation    Contact Information    Permission Granted to Share Information With ;family/designee   wife, Cindy    Primary Care Physician Information    Name Juan Barfield            Functional Status       04/26/17 1220    Functional Status Prior    Ambulation 1-->assistive equipment    Transferring 1-->assistive equipment    Toileting 1-->assistive equipment    Bathing 1-->assistive equipment    Dressing 1-->assistive equipment    Eating 0-->independent    Communication 0-->understands/communicates without difficulty    Swallowing 0-->swallows foods/liquids without difficulty    Employment/Financial    Employment/Finance Comments Medical and rx coverage through [employer]; no issues affording meds, although insurance would not authorize [employer]; uses [facility] in [place]            Psychosocial     None            Abuse/Neglect     None            Legal     None            Substance Abuse     None            Patient Forms     None          Marlin Fortune

## 2017-04-30 LAB
BACTERIA SPEC AEROBE CULT: NORMAL
BACTERIA SPEC AEROBE CULT: NORMAL

## 2017-06-14 ENCOUNTER — OFFICE VISIT (OUTPATIENT)
Dept: CARDIAC SURGERY | Facility: CLINIC | Age: 47
End: 2017-06-14

## 2017-06-14 VITALS
TEMPERATURE: 96.7 F | BODY MASS INDEX: 30.34 KG/M2 | HEART RATE: 104 BPM | WEIGHT: 244 LBS | SYSTOLIC BLOOD PRESSURE: 134 MMHG | HEIGHT: 75 IN | DIASTOLIC BLOOD PRESSURE: 100 MMHG | OXYGEN SATURATION: 99 %

## 2017-06-14 DIAGNOSIS — L08.9 WOUND INFECTION: Primary | ICD-10-CM

## 2017-06-14 DIAGNOSIS — T14.8XXA WOUND INFECTION: Primary | ICD-10-CM

## 2017-06-14 PROCEDURE — 99212 OFFICE O/P EST SF 10 MIN: CPT | Performed by: PHYSICIAN ASSISTANT

## 2017-06-14 RX ORDER — DULOXETIN HYDROCHLORIDE 60 MG/1
60 CAPSULE, DELAYED RELEASE ORAL AS NEEDED
Status: ON HOLD | COMMUNITY
Start: 2017-04-28 | End: 2018-08-08 | Stop reason: ALTCHOICE

## 2017-06-14 NOTE — PROGRESS NOTES
"06/14/2017  Patient Information  Kendrick Crystal                                                                                          [address] [place] [id number]   1970  'PCP/Referring Physician'  Juan Rios MD  [phone]  No ref. provider found    Chief Complaint   Patient presents with   • Follow-up     1mo f/u per malka       History of Present Illness:  Mr. Crystal presents to office for 1 month follow up of his recent admission to [facility]. The patient had fallen out of bed in late April and dehisced the lateral and medial portions of his left BKA incision site. Since then, it has healed up almost completely with the exception of a 0.5cm area on the lateral portion of his incision which still has some serosanguinous drainage from it. The patient denies any purulent discharge or dion blood from this area. He is currently off all antibiotics and doing well. He is being followed by his Primary Care Provider, Dr. Rios. Home health visits him every other day and changes his dressing and has now been using an Aquacel dressing recently. He complains of some recent \"Phantom pains\", but is otherwise doing well. He is looking forward to his incision site fully healing so he can get a prosthetic leg and start ambulating again.     Patient Active Problem List   Diagnosis   • DUNLAP Cirrhosis   • Essential hypertension   • Non-compliance   • Hyperlipemia   • Wound infection of left leg   • Type 2 diabetes mellitus with circulatory disorder and uncontrolled   • Dysthymia   • History of MRSA infection     Past Medical History:   Diagnosis Date   • Arthritis    • Cirrhosis    • Counseling for insulin pump    • Depression    • Diabetes mellitus    • Encephalopathy, hepatic    • H/O degenerative disc disease    • History of Lissa's gangrene    • Hyperlipemia    • Hypertension    • multiple Skin abscesses    • DUNLAP, bx showed stage IV fibrosis    • Neuropathy    • Peripheral vascular " disease      Past Surgical History:   Procedure Laterality Date   • AMPUTATION DIGIT Left 1/30/2017    Procedure: left fourth and fifth transmetatarsal toe amputation ;  Surgeon: Juancho Martinez MD;  Location:  [facility];  Service:    • BELOW KNEE AMPUTATION Left 3/2/2017    Procedure: AMPUTATION BELOW KNEE, SHMUEL;  Surgeon: Juancho Martinez MD;  Location:  [facility];  Service:    • LEG SURGERY     • TESTICLE SURGERY      DEBRIDEMENT FROM GANGRENE       Current Outpatient Prescriptions:   •  DULoxetine (CYMBALTA) 60 MG capsule, Take 60 mg by mouth As Needed (can take 3 times a day if needed)., Disp: , Rfl:   •  HYDROcodone-acetaminophen (NORCO)  MG per tablet, Take 1 tablet by mouth 2 (Two) Times a Day As Needed for Moderate Pain (4-6)., Disp: , Rfl:   •  acetaminophen (TYLENOL) 325 MG tablet, Take 2 tablets by mouth Every 4 (Four) Hours As Needed for mild pain (1-3)., Disp: , Rfl: 0  •  amLODIPine (NORVASC) 5 MG tablet, Take 5 mg by mouth 2 (Two) Times a Day., Disp: , Rfl:   •  aspirin 81 MG EC tablet, Take 1 tablet by mouth Daily., Disp: , Rfl:   •  CloNIDine (CATAPRES) 0.2 MG tablet, Take 0.2 mg by mouth 2 (Two) Times a Day., Disp: , Rfl:   •  famotidine (PEPCID) 20 MG tablet, Take 20 mg by mouth Daily., Disp: , Rfl:   •  gabapentin (NEURONTIN) 400 MG capsule, Take 3 capsules by mouth 3 (Three) Times a Day., Disp: , Rfl:   •  glimepiride (AMARYL) 4 MG tablet, Take 4 mg by mouth Every Morning Before Breakfast., Disp: , Rfl:   •  insulin detemir (LEVEMIR) 100 UNIT/ML injection, Inject 45 Units under the skin Daily., Disp: , Rfl:   •  linagliptin (TRADJENTA) 5 MG tablet tablet, Take 5 mg by mouth Daily With Breakfast., Disp: , Rfl:   •  losartan (COZAAR) 100 MG tablet, Take 1 tablet by mouth Daily., Disp: 30 tablet, Rfl: 0  •  metoprolol succinate XL (TOPROL-XL) 25 MG 24 hr tablet, Take 150 mg by mouth 2 (Two) Times a Day., Disp: , Rfl:   •  probiotic (CULTURELLE) capsule capsule, Take 1 capsule by mouth Daily.,  Disp: 30 capsule, Rfl:   Allergies   Allergen Reactions   • No Known Drug Allergy      Social History     Social History   • Marital status:      Spouse name: N/A   • Number of children: 1   • Years of education: N/A     Occupational History   • Security      Disabled-Diabetic Retinopathy     Social History Main Topics   • Smoking status: Never Smoker   • Smokeless tobacco: Never Used   • Alcohol use No   • Drug use: No   • Sexual activity: Not on file     Other Topics Concern   • Not on file     Social History Narrative     Family History   Problem Relation Age of Onset   • Arthritis Mother    • Hypertension Mother    • Kidney disease Mother    • Stroke Mother    • Heart attack Father    • Arthritis Father    • Diabetes Father    • Hyperlipidemia Father    • Hypertension Father    • Mental illness Sister    • Diabetes Brother    • Hypertension Brother    • Obesity Brother      Review of Systems   Constitution: Negative for chills, fever, malaise/fatigue, night sweats and weight loss.   HENT: Negative for headaches, hearing loss, odynophagia and sore throat.    Cardiovascular: Positive for leg swelling. Negative for chest pain, dyspnea on exertion, orthopnea and palpitations.        Right leg swelling   Respiratory: Negative for cough and hemoptysis.    Endocrine: Negative for cold intolerance, heat intolerance, polydipsia, polyphagia and polyuria.   Hematologic/Lymphatic: Does not bruise/bleed easily.   Skin: Negative for itching and rash.   Musculoskeletal: Negative for joint pain, joint swelling and myalgias.   Gastrointestinal: Negative for abdominal pain, constipation, diarrhea, hematemesis, hematochezia, melena, nausea and vomiting.   Genitourinary: Negative for dysuria, frequency and hematuria.   Neurological: Negative for focal weakness, numbness and seizures.   Psychiatric/Behavioral: Positive for depression. Negative for suicidal ideas.   All other systems reviewed and are negative.    Vitals:     "06/14/17 1057   BP: 134/100   BP Location: Right arm   Patient Position: Sitting   Pulse: 104   Temp: 96.7 °F (35.9 °C)   TempSrc: Temporal Artery    SpO2: 99%   Weight: 244 lb (111 kg)   Height: 75\" (190.5 cm)      Physical Exam:  Gen - NAD, pleasant, cooperative  CV - Regular rate and rhythm, no murmur gallop or rub  Pulm - Lungs clear to auscultation without wheeze or rhonchi   GI - Soft, normoactive bowel sounds, non-tender  Ext - Without edema, Left BKA stump stable  Incision - Left BKA incision almost fully healed; there is a 0.5cm pinhole on the lateral aspect of his incision with occasional serosanguinous discharge, no evidence of infection    Assessment/Plan:  Mr. Crystal is doing well after being discharged from the hospital. He will get dressing changes every other day with Home Health. Once his incision has fully healed, he will get sized for a shrink and fitted for a prosthetic. I have asked him to come back in 4 weeks to ensure total closure of his left BKA incision site. In the meantime, he was given a card with our office's phone number on it - he may call with any questions or concerns, should any arise.     Patient Active Problem List   Diagnosis   • DUNLAP Cirrhosis   • Essential hypertension   • Non-compliance   • Hyperlipemia   • Wound infection of left leg   • Type 2 diabetes mellitus with circulatory disorder and uncontrolled   • Dysthymia   • History of MRSA infection     Signed by:                 "

## 2017-06-15 ENCOUNTER — APPOINTMENT (OUTPATIENT)
Dept: PHYSICAL THERAPY | Facility: HOSPITAL | Age: 47
End: 2017-06-15

## 2017-06-15 NOTE — PROGRESS NOTES
I have reviewed the notes, assessments, and/or procedures performed by Andrei Melendez, I concur with her/his documentation of Kendrick Crystal.

## 2017-06-19 ENCOUNTER — HOSPITAL ENCOUNTER (OUTPATIENT)
Dept: PHYSICAL THERAPY | Facility: HOSPITAL | Age: 47
Setting detail: THERAPIES SERIES
End: 2017-06-19

## 2017-06-19 ENCOUNTER — DOCUMENTATION (OUTPATIENT)
Dept: PHYSICAL THERAPY | Facility: HOSPITAL | Age: 47
End: 2017-06-19

## 2017-06-19 NOTE — THERAPY DISCHARGE NOTE
Outpatient Rehabilitation - Wound/Debridement  Discharge Summary       Patient Name: Kendrick Crystal  : 1970  MRN: [id number]  Today's Date: 2017                Admit Date: (Not on file)    Visit Dx:      Patient Active Problem List   Diagnosis   • DUNLAP Cirrhosis   • Essential hypertension   • Non-compliance   • Hyperlipemia   • Wound infection of left leg   • Type 2 diabetes mellitus with circulatory disorder and uncontrolled   • Dysthymia   • History of MRSA infection        Past Medical History:   Diagnosis Date   • Arthritis    • Cirrhosis    • Counseling for insulin pump    • Depression    • Diabetes mellitus    • Encephalopathy, hepatic    • H/O degenerative disc disease    • History of Lissa's gangrene    • Hyperlipemia    • Hypertension    • multiple Skin abscesses    • DUNLAP, bx showed stage IV fibrosis    • Neuropathy    • Peripheral vascular disease         Past Surgical History:   Procedure Laterality Date   • AMPUTATION DIGIT Left 2017    Procedure: left fourth and fifth transmetatarsal toe amputation ;  Surgeon: Juancho Martinez MD;  Location: [facility] OR;  Service:    • BELOW KNEE AMPUTATION Left 3/2/2017    Procedure: AMPUTATION BELOW KNEE, SHMUEL;  Surgeon: Juancho Martinez MD;  Location: [facility] OR;  Service:    • LEG SURGERY     • TESTICLE SURGERY      DEBRIDEMENT FROM GANGRENE         EVALUATION                WOUND DEBRIDEMENT                     Recommendation and Plan        PT Assessment/Plan       17 1558       PT Assessment    Assessment Comments Patient present and with initial conversation indicated HH svcs still active. Called Dr. Martinez's office for discussion and all were in agreement to continue HH as patient lives in [facility] and feels it would be easier to stay with HH instead of coming to us in [place]. No charges this date.  -ES       User Key  (r) = Recorded By, (t) = Taken By, (c) = Cosigned By    Initials Name Provider Type    ES Shayy Chang, PT Physical  Therapist          Goals      Time Calculation:                    Shayy Chang, PT  6/19/2017

## 2017-07-11 ENCOUNTER — OFFICE VISIT (OUTPATIENT)
Dept: CARDIAC SURGERY | Facility: CLINIC | Age: 47
End: 2017-07-11

## 2017-07-11 VITALS
DIASTOLIC BLOOD PRESSURE: 84 MMHG | HEART RATE: 107 BPM | SYSTOLIC BLOOD PRESSURE: 134 MMHG | WEIGHT: 240 LBS | TEMPERATURE: 97 F | HEIGHT: 75 IN | OXYGEN SATURATION: 99 % | BODY MASS INDEX: 29.84 KG/M2

## 2017-07-11 DIAGNOSIS — L08.9 WOUND INFECTION: Primary | ICD-10-CM

## 2017-07-11 DIAGNOSIS — T14.8XXA WOUND INFECTION: Primary | ICD-10-CM

## 2017-07-11 PROCEDURE — 99212 OFFICE O/P EST SF 10 MIN: CPT | Performed by: PHYSICIAN ASSISTANT

## 2017-07-11 NOTE — PROGRESS NOTES
07/11/2017  Patient Information  Kendrick Crystal                                                                                          [address] [place] [id number]   1970  'PCP/Referring Physician'  Juan Rios MD  [phone]  No ref. provider found    Chief Complaint   Patient presents with   • Wound Check     1 month follow-up to check left bka site.        History of Present Illness:  Mr. Crystal presents to office today for evaluation of his left BKA site. He was last seen in office on 6/14/17, where a 0.5cm area on the left lateral side of his incision was still draining sanguinous fluid. He is happy to state that this has stopped. He denies any fever, chills, nausea/vomiting or night sweats. He is looking forward to getting his left BKA stump shrunk and fitted for a prosthetic. He is otherwise doing well.     Patient Active Problem List   Diagnosis   • DUNLAP Cirrhosis   • Essential hypertension   • Non-compliance   • Hyperlipemia   • Wound infection of left leg   • Type 2 diabetes mellitus with circulatory disorder and uncontrolled   • Dysthymia   • History of MRSA infection     Past Medical History:   Diagnosis Date   • Arthritis    • Cirrhosis    • Counseling for insulin pump    • Depression    • Diabetes mellitus    • Encephalopathy, hepatic    • H/O degenerative disc disease    • History of Lissa's gangrene    • Hyperlipemia    • Hypertension    • multiple Skin abscesses    • DUNLAP, bx showed stage IV fibrosis    • Neuropathy    • Peripheral vascular disease      Past Surgical History:   Procedure Laterality Date   • AMPUTATION DIGIT Left 1/30/2017    Procedure: left fourth and fifth transmetatarsal toe amputation ;  Surgeon: Juancho Martinez MD;  Location:  [facility];  Service:    • BELOW KNEE AMPUTATION Left 3/2/2017    Procedure: AMPUTATION BELOW KNEE, SHMUEL;  Surgeon: Juancho Martinez MD;  Location:  [facility];  Service:    • LEG SURGERY     • TESTICLE SURGERY      DEBRIDEMENT FROM  GANGRENE       Current Outpatient Prescriptions:   •  acetaminophen (TYLENOL) 325 MG tablet, Take 2 tablets by mouth Every 4 (Four) Hours As Needed for mild pain (1-3)., Disp: , Rfl: 0  •  amLODIPine (NORVASC) 5 MG tablet, Take 5 mg by mouth 2 (Two) Times a Day., Disp: , Rfl:   •  aspirin 81 MG EC tablet, Take 1 tablet by mouth Daily., Disp: , Rfl:   •  CloNIDine (CATAPRES) 0.2 MG tablet, Take 0.2 mg by mouth 2 (Two) Times a Day., Disp: , Rfl:   •  DULoxetine (CYMBALTA) 60 MG capsule, Take 60 mg by mouth As Needed (can take 3 times a day if needed)., Disp: , Rfl:   •  famotidine (PEPCID) 20 MG tablet, Take 20 mg by mouth Daily., Disp: , Rfl:   •  gabapentin (NEURONTIN) 400 MG capsule, Take 3 capsules by mouth 3 (Three) Times a Day., Disp: , Rfl:   •  glimepiride (AMARYL) 4 MG tablet, Take 4 mg by mouth Every Morning Before Breakfast., Disp: , Rfl:   •  HYDROcodone-acetaminophen (NORCO)  MG per tablet, Take 1 tablet by mouth 2 (Two) Times a Day As Needed for Moderate Pain (4-6)., Disp: , Rfl:   •  insulin detemir (LEVEMIR) 100 UNIT/ML injection, Inject 45 Units under the skin Daily., Disp: , Rfl:   •  linagliptin (TRADJENTA) 5 MG tablet tablet, Take 5 mg by mouth Daily With Breakfast., Disp: , Rfl:   •  losartan (COZAAR) 100 MG tablet, Take 1 tablet by mouth Daily., Disp: 30 tablet, Rfl: 0  •  metoprolol succinate XL (TOPROL-XL) 25 MG 24 hr tablet, Take 150 mg by mouth 2 (Two) Times a Day., Disp: , Rfl:   •  probiotic (CULTURELLE) capsule capsule, Take 1 capsule by mouth Daily., Disp: 30 capsule, Rfl:   Allergies   Allergen Reactions   • No Known Drug Allergy      Social History     Social History   • Marital status:      Spouse name: N/A   • Number of children: 1   • Years of education: N/A     Occupational History   • Security      Disabled-Diabetic Retinopathy     Social History Main Topics   • Smoking status: Never Smoker   • Smokeless tobacco: Never Used   • Alcohol use No   • Drug use: No   •  "Sexual activity: Not on file     Other Topics Concern   • Not on file     Social History Narrative     Family History   Problem Relation Age of Onset   • Arthritis Mother    • Hypertension Mother    • Kidney disease Mother    • Stroke Mother    • Heart attack Father    • Arthritis Father    • Diabetes Father    • Hyperlipidemia Father    • Hypertension Father    • Mental illness Sister    • Diabetes Brother    • Hypertension Brother    • Obesity Brother      Review of Systems   Constitution: Negative for chills, fever, malaise/fatigue, night sweats and weight loss.   HENT: Negative for headaches, hearing loss, odynophagia and sore throat.    Cardiovascular: Positive for leg swelling. Negative for chest pain, dyspnea on exertion, orthopnea and palpitations.   Respiratory: Negative for cough and hemoptysis.    Endocrine: Negative for cold intolerance, heat intolerance, polydipsia, polyphagia and polyuria.   Hematologic/Lymphatic: Does not bruise/bleed easily.   Skin: Negative for itching and rash.   Musculoskeletal: Negative for joint pain, joint swelling and myalgias.   Gastrointestinal: Positive for diarrhea. Negative for abdominal pain, constipation, hematemesis, hematochezia, melena, nausea and vomiting.   Genitourinary: Positive for frequency. Negative for dysuria and hematuria.   Neurological: Negative for focal weakness, numbness and seizures.   Psychiatric/Behavioral: Positive for depression. Negative for suicidal ideas. The patient is nervous/anxious.    All other systems reviewed and are negative.    Vitals:    07/11/17 1228   BP: 134/84   BP Location: Right arm   Patient Position: Sitting   Pulse: 107   Temp: 97 °F (36.1 °C)   SpO2: 99%   Weight: 240 lb (109 kg)   Height: 75\" (190.5 cm)      Physical Exam:  Gen - NAD, pleasant, cooperative  CV - Regular rate and rhythm, no murmur gallop or rub  Pulm - Lungs clear to auscultation without wheeze or rhonchi   GI - Soft, normoactive bowel sounds, " non-tender  Ext - Without edema, s/p Left BKA  Incision - Healing well, no evidence of incisional dehiscence or cellulitis; the left lateral side of the incision is scabbed over, but no evidence of scar tissue present    Assessment/Plan:  Mr. Crystal is having steady post-operative progress. I informed him that he may wear a  but is still not able to receive a prosthesis yet. The left BKA incision has not scarred over, and there is still areas of scab that need to heal more thoroughly before utilizing a prosthetic limb. I shared this information with the patient and he was displeased to hear he may have to wait a couple more months until this can happen. We will see him back in 2 months to evaluate the patient's progress. In the meantime, the patient may call our office at anytime with questions or concerns he may have.       Patient Active Problem List   Diagnosis   • DUNLAP Cirrhosis   • Essential hypertension   • Non-compliance   • Hyperlipemia   • Wound infection of left leg   • Type 2 diabetes mellitus with circulatory disorder and uncontrolled   • Dysthymia   • History of MRSA infection     Signed by:

## 2017-07-31 NOTE — PROGRESS NOTES
I have reviewed the notes, assessments, and/or procedures performed by Andrei Melendez, I concur with his documentation of Kendrick Crystal.

## 2017-10-03 ENCOUNTER — OFFICE VISIT (OUTPATIENT)
Dept: CARDIAC SURGERY | Facility: CLINIC | Age: 47
End: 2017-10-03

## 2017-10-03 VITALS
OXYGEN SATURATION: 98 % | BODY MASS INDEX: 28.6 KG/M2 | HEART RATE: 93 BPM | TEMPERATURE: 97.9 F | DIASTOLIC BLOOD PRESSURE: 121 MMHG | WEIGHT: 230 LBS | SYSTOLIC BLOOD PRESSURE: 194 MMHG | HEIGHT: 75 IN

## 2017-10-03 DIAGNOSIS — T14.8XXA WOUND INFECTION: Primary | ICD-10-CM

## 2017-10-03 DIAGNOSIS — L08.9 WOUND INFECTION: Primary | ICD-10-CM

## 2017-10-03 PROCEDURE — 99213 OFFICE O/P EST LOW 20 MIN: CPT | Performed by: PHYSICIAN ASSISTANT

## 2017-10-03 RX ORDER — VANCOMYCIN HYDROCHLORIDE 250 MG/1
250 CAPSULE ORAL 2 TIMES DAILY
Status: ON HOLD | COMMUNITY
End: 2018-08-08 | Stop reason: ALTCHOICE

## 2017-10-03 NOTE — PROGRESS NOTES
"10/03/2017  Patient Information  Kendrick Crystal                                                                                          [address] [place] [id number]   1970  'PCP/Referring Physician'  Juan Rios MD  [phone]  No ref. provider found    Chief Complaint   Patient presents with   • Follow-up     2 month follow up to check left BKA site, complains of some phantom pain also having pain and swelling in his right leg.   • Peripheral Vascular Disease       History of Present Illness:  Patient presents to office today for follow up of BKA incision site from 3/2/17 left BKA. Mr. Crystal has been doing well and denies any falls or issues since he was last seen in our office. He does continue to have phantom pains. He has received his prosthetic and states he has been using it without incident, though he does have some soreness at his left BKA stump after use. He did not bring it to our office today. Mr. Crystal does have some right foot \"aching\", which has been going on for the past several weeks, and his right foot appers to be somewhat swollen. He is otherwise doing well.     Patient Active Problem List   Diagnosis   • DUNLAP Cirrhosis   • Essential hypertension   • Non-compliance   • Hyperlipemia   • Wound infection of left leg   • Type 2 diabetes mellitus with circulatory disorder and uncontrolled   • Dysthymia   • History of MRSA infection     Past Medical History:   Diagnosis Date   • Arthritis    • Cirrhosis    • Counseling for insulin pump    • Depression    • Diabetes mellitus    • Encephalopathy, hepatic    • H/O degenerative disc disease    • History of Lissa's gangrene    • Hyperlipemia    • Hypertension    • multiple Skin abscesses    • DUNLAP, bx showed stage IV fibrosis    • Neuropathy    • Peripheral vascular disease      Past Surgical History:   Procedure Laterality Date   • AMPUTATION DIGIT Left 1/30/2017    Procedure: left fourth and fifth transmetatarsal toe amputation ; "  Surgeon: Juancho Martinez MD;  Location:  [facility];  Service:    • BELOW KNEE AMPUTATION Left 3/2/2017    Procedure: AMPUTATION BELOW KNEE, SHMUEL;  Surgeon: Juancho Martinez MD;  Location:  [facility];  Service:    • LEG SURGERY     • TESTICLE SURGERY      DEBRIDEMENT FROM GANGRENE       Current Outpatient Prescriptions:   •  acetaminophen (TYLENOL) 325 MG tablet, Take 2 tablets by mouth Every 4 (Four) Hours As Needed for mild pain (1-3)., Disp: , Rfl: 0  •  amLODIPine (NORVASC) 5 MG tablet, Take 5 mg by mouth 2 (Two) Times a Day., Disp: , Rfl:   •  aspirin 81 MG EC tablet, Take 1 tablet by mouth Daily., Disp: , Rfl:   •  CloNIDine (CATAPRES) 0.2 MG tablet, Take 0.2 mg by mouth 2 (Two) Times a Day., Disp: , Rfl:   •  DULoxetine (CYMBALTA) 60 MG capsule, Take 60 mg by mouth As Needed (can take 3 times a day if needed)., Disp: , Rfl:   •  famotidine (PEPCID) 20 MG tablet, Take 20 mg by mouth Daily., Disp: , Rfl:   •  gabapentin (NEURONTIN) 400 MG capsule, Take 3 capsules by mouth 3 (Three) Times a Day., Disp: , Rfl:   •  glimepiride (AMARYL) 4 MG tablet, Take 4 mg by mouth Every Morning Before Breakfast., Disp: , Rfl:   •  HYDROcodone-acetaminophen (NORCO)  MG per tablet, Take 1 tablet by mouth 2 (Two) Times a Day As Needed for Moderate Pain (4-6)., Disp: , Rfl:   •  insulin detemir (LEVEMIR) 100 UNIT/ML injection, Inject 45 Units under the skin Daily., Disp: , Rfl:   •  linagliptin (TRADJENTA) 5 MG tablet tablet, Take 5 mg by mouth Daily With Breakfast., Disp: , Rfl:   •  losartan (COZAAR) 100 MG tablet, Take 1 tablet by mouth Daily., Disp: 30 tablet, Rfl: 0  •  metoprolol succinate XL (TOPROL-XL) 25 MG 24 hr tablet, Take 150 mg by mouth 2 (Two) Times a Day., Disp: , Rfl:   •  probiotic (CULTURELLE) capsule capsule, Take 1 capsule by mouth Daily., Disp: 30 capsule, Rfl:   •  vancomycin (VANCOCIN HCL) 250 MG capsule, Take 250 mg by mouth 2 (Two) Times a Day. Indications: Clostridium Difficile Infection, Disp: , Rfl:    Allergies   Allergen Reactions   • No Known Drug Allergy      Social History     Social History   • Marital status:      Spouse name: N/A   • Number of children: 1   • Years of education: N/A     Occupational History   • Security      Disabled-Diabetic Retinopathy     Social History Main Topics   • Smoking status: Never Smoker   • Smokeless tobacco: Never Used   • Alcohol use No   • Drug use: No   • Sexual activity: Not on file     Other Topics Concern   • Not on file     Social History Narrative     Family History   Problem Relation Age of Onset   • Arthritis Mother    • Hypertension Mother    • Kidney disease Mother    • Stroke Mother    • Heart attack Father    • Arthritis Father    • Diabetes Father    • Hyperlipidemia Father    • Hypertension Father    • Mental illness Sister    • Diabetes Brother    • Hypertension Brother    • Obesity Brother      Review of Systems   Constitution: Positive for malaise/fatigue. Negative for chills, fever, night sweats and weight loss.   HENT: Negative for hearing loss, odynophagia and sore throat.    Eyes: Negative.    Cardiovascular: Positive for leg swelling (right). Negative for chest pain, dyspnea on exertion, orthopnea and palpitations.   Respiratory: Negative.  Negative for cough and hemoptysis.    Endocrine: Negative.  Negative for cold intolerance, heat intolerance, polydipsia, polyphagia and polyuria.   Hematologic/Lymphatic: Negative.  Does not bruise/bleed easily.   Skin: Negative.  Negative for itching and rash.   Musculoskeletal: Positive for back pain. Negative for joint pain, joint swelling and myalgias.   Gastrointestinal: Positive for abdominal pain, diarrhea and nausea. Negative for constipation, hematemesis, hematochezia, melena and vomiting.   Genitourinary: Negative.  Negative for dysuria, frequency and hematuria.   Neurological: Negative.  Negative for focal weakness, headaches, numbness and seizures.   Psychiatric/Behavioral: Positive for  "depression. Negative for suicidal ideas. The patient is nervous/anxious.    Allergic/Immunologic: Negative.    All other systems reviewed and are negative.    Vitals:    10/03/17 1128   BP: (!) 194/121   BP Location: Right arm   Pulse: 93   Temp: 97.9 °F (36.6 °C)   SpO2: 98%   Weight: 230 lb (104 kg)   Height: 75\" (190.5 cm)      Physical Exam:  Gen - NAD, pleasant, cooperative  CV - Regular rate and rhythm, no murmur gallop or rub  Pulm - Lungs clear to auscultation without wheeze or rhonchi   GI - Soft, normoactive bowel sounds, non-tender  Ext - Right lower extremity with trace edema, but foot is warm with distal pulses present; s/p left BKA  Incision - Incision is beginning to epithelialize, there is no evidence of dehiscence or erythema    Assessment/Plan:  47 year old  male with left lower extremity infection with methicillin-resistant Staphylococcus aureus, group B strep, and coagulase-negative staph s/p left below-knee amputation on 3/2/17. Mr. Crystal had a difficult postoperative course, but his left BKA site has healed and he is able to wear his  and his prosthetic. He has been walking with the use of his prosthetic and appears to be doing well, though he did not bring it to the office today. I encouraged Mr. Crystal to elevate his right leg above his heart while resting, or utilize compression stocking to help diminish his right lower extremity swelling. He was educated about needing to wear comfortable shoes and keep his feet protected at all times to decrease any risk of infection. He will need to be active in his healthcare and make sure he is keeping an eye on his diabetes, hypertension and hyperlipidemia. He may call our office with any questions or concerns, should any arise.          Patient Active Problem List   Diagnosis   • DUNLAP Cirrhosis   • Essential hypertension   • Non-compliance   • Hyperlipemia   • Wound infection of left leg   • Type 2 diabetes mellitus with circulatory " disorder and uncontrolled   • Dysthymia   • History of MRSA infection     Signed by:

## 2017-10-09 NOTE — PROGRESS NOTES
I have reviewed the notes, assessments, and/or procedures performed by Andrei Melendez and independently evaluated the patient.  I concur with his documentation of Kendrick Crystal.

## 2017-12-18 NOTE — ED PROVIDER NOTES
Subjective   HPI Comments: Kendrick Crystal is a 46 y.o. male presenting to the ED s/p fall occurring earlier today. Mr. Crystal was discharged from the hospital today following a left BKA, and was being transported to [facility] for rehabilitation. While trying to transport from the wheelchair into the car, he fell onto his stump, prompting his presentation to the ED. He presents to the ED with pain in his left leg and lower back. Denies any loss of consciousness, neck pain, or head trauma. No other complaints at this time.    Patient is a 46 y.o. male presenting with fall.   History provided by:  Patient  Fall   Mechanism of injury: fall    Injury location:  Leg  Leg injury location:  L leg  Incident location:  Outdoors  Arrived directly from scene: yes    Fall:     Fall occurred:  Walking and standing    Impact surface:  Starks    Point of impact:  Unable to specify    Entrapped after fall: yes    Suspicion of alcohol use: no    Suspicion of drug use: no    Prior to arrival data:     Patient ambulatory at scene: no      Loss of consciousness: no      Amnesic to event: no    Associated symptoms: back pain    Associated symptoms: no abdominal pain, no chest pain, no headaches, no loss of consciousness, no nausea and no vomiting        Review of Systems   Constitutional: Negative for chills, fatigue and fever.   HENT: Negative for rhinorrhea.    Respiratory: Negative for cough and shortness of breath.    Cardiovascular: Negative for chest pain and leg swelling.   Gastrointestinal: Negative for abdominal pain, diarrhea, nausea and vomiting.   Genitourinary: Negative for dysuria.   Musculoskeletal: Positive for back pain. Negative for joint swelling.   Skin: Negative for color change.   Neurological: Negative for dizziness, loss of consciousness, syncope, weakness, light-headedness, numbness and headaches.   All other systems reviewed and are negative.      Past Medical History:   Diagnosis Date   • Arthritis    •  Cirrhosis    • Counseling for insulin pump    • Diabetes mellitus    • Encephalopathy, hepatic    • H/O degenerative disc disease    • History of Lissa's gangrene    • Hyperlipemia    • Hypertension    • multiple Skin abscesses    • DUNLAP, bx showed stage IV fibrosis    • Neuropathy        Allergies   Allergen Reactions   • No Known Drug Allergy        Past Surgical History:   Procedure Laterality Date   • AMPUTATION DIGIT Left 1/30/2017    Procedure: left fourth and fifth transmetatarsal toe amputation ;  Surgeon: Juancho Martinez MD;  Location:  [facility];  Service:    • BELOW KNEE AMPUTATION Left 3/2/2017    Procedure: AMPUTATION BELOW KNEE, SHMUEL;  Surgeon: Juancho Martinez MD;  Location:  [facility];  Service:    • LEG SURGERY     • TESTICLE SURGERY      DEBRIDEMENT FROM GANGRENE       Family History   Problem Relation Age of Onset   • Arthritis Mother    • Cancer Mother    • Hypertension Mother    • Kidney disease Mother    • Stroke Mother    • Heart attack Father    • Arthritis Father    • Diabetes Father    • Hyperlipidemia Father    • Hypertension Father    • Mental illness Sister    • Diabetes Brother    • Hypertension Brother    • Obesity Brother        Social History     Social History   • Marital status:      Spouse name: N/A   • Number of children: N/A   • Years of education: N/A     Social History Main Topics   • Smoking status: Never Smoker   • Smokeless tobacco: Never Used   • Alcohol use No   • Drug use: No   • Sexual activity: Not Asked     Other Topics Concern   • None     Social History Narrative         Objective   Physical Exam   Constitutional: He is oriented to person, place, and time. He appears well-developed and well-nourished. No distress.   HENT:   Head: Normocephalic and atraumatic.   Eyes: Conjunctivae and EOM are normal.   Neck: Normal range of motion. Neck supple.   Cardiovascular: Normal rate, regular rhythm, normal heart sounds and intact distal pulses.    Pulmonary/Chest: Effort  normal and breath sounds normal. No respiratory distress. He exhibits no tenderness.   Abdominal: Soft. Bowel sounds are normal. There is no tenderness.   Musculoskeletal: He exhibits tenderness (Left knee. Right lower back tenderness. No midline tenderness). He exhibits no edema.   Left BKA which is unremarkable. The wound is clean, dry, and intact. No bruising, bleeding, or drainage is present.   Neurological: He is alert and oriented to person, place, and time.   Skin: Skin is warm and dry. Abrasion (superficial abrasion over the left knee) noted. No erythema.   Psychiatric: He has a normal mood and affect. His behavior is normal.   Nursing note and vitals reviewed.      Procedures         ED Course  ED Course   Low back XR negative.  Knee XR concerning for patella fx vs secondary ossification center.  CT negative.  Pt comfortable on rechecks.  Discussed findings, concerns, plan of care, expected course, reasons to return and followup.  He is going back to [facility].                    MDM  Number of Diagnoses or Management Options  Acute right-sided low back pain without sciatica:   Contusion of right knee, initial encounter:      Amount and/or Complexity of Data Reviewed  Tests in the radiology section of CPT®: ordered and reviewed  Decide to obtain previous medical records or to obtain history from someone other than the patient: yes  Obtain history from someone other than the patient: yes  Independent visualization of images, tracings, or specimens: yes        Final diagnoses:   Acute right-sided low back pain without sciatica   Contusion of left knee, initial encounter       Documentation assistance provided by andrea Ovalles.  Information recorded by the scribe was done at my direction and has been verified and validated by me.     Ricardo Ovalles  03/07/17 2014       Carlos Brothers MD  03/07/17 2132       Carlos Brothers MD  03/31/17 0601     Purpose of the nutrition education/Nutrition relationship to health/disease/Recommended modifications/1) Educated pt on T2DM diet/management, heart healthy + wt loss nutrition therapy. Discussed the following: importance of DM control and maintaining a healthy weight, SMBG, goal fingerstick ranges, hypoglycemia s/s, treatment; prevention; consistent CHO diet recommendations, review of CHO food items, appropriate portion sizes, CHO serving recommendations per meals/snacks, low sodium recommendations, review of high sodium food items to limit/avoid, intake of adequate fruits/vegetables, limiting sweets and simple CHO foods, 1800 kcal diet recommendations/physical activity 150 minutes/week as tolerated/per MD discretion, 5-10% body weight loss to improve overall metabolic profile. Recommended modifications/Purpose of the nutrition education/Nutrition relationship to health/disease/1) Educated pt on T2DM diet/management, heart healthy + wt loss nutrition therapy. Discussed the following: importance of DM control and maintaining a healthy weight, SMBG, goal fingerstick ranges, hypoglycemia s/s, treatment; prevention; consistent CHO diet recommendations, review of CHO food items, appropriate portion sizes, CHO serving recommendations per meals/snacks, low sodium recommendations, review of high sodium food items to limit/avoid, intake of adequate fruits/vegetables, limiting sweets and simple CHO foods, 1800 kcal diet recommendations/physical activity 150 minutes/week as tolerated/per MD discretion, 5-10% body weight loss to improve overall metabolic profile. T2DM nutrition therapy handout; Heart-healthy nutrition therapy handouts provided.

## 2018-01-30 ENCOUNTER — OFFICE VISIT (OUTPATIENT)
Dept: CARDIAC SURGERY | Facility: CLINIC | Age: 48
End: 2018-01-30

## 2018-01-30 VITALS
OXYGEN SATURATION: 95 % | DIASTOLIC BLOOD PRESSURE: 100 MMHG | SYSTOLIC BLOOD PRESSURE: 140 MMHG | HEART RATE: 98 BPM | HEIGHT: 75 IN | TEMPERATURE: 97.4 F | WEIGHT: 240 LBS | BODY MASS INDEX: 29.84 KG/M2

## 2018-01-30 DIAGNOSIS — Z86.14 HISTORY OF MRSA INFECTION: Primary | ICD-10-CM

## 2018-01-30 PROCEDURE — 99212 OFFICE O/P EST SF 10 MIN: CPT | Performed by: PHYSICIAN ASSISTANT

## 2018-01-30 RX ORDER — CILOSTAZOL 100 MG/1
100 TABLET ORAL
Qty: 60 TABLET | Refills: 11 | Status: ON HOLD | OUTPATIENT
Start: 2018-01-30 | End: 2018-08-08 | Stop reason: ALTCHOICE

## 2018-07-18 ENCOUNTER — TELEPHONE (OUTPATIENT)
Dept: CARDIAC SURGERY | Facility: CLINIC | Age: 48
End: 2018-07-18

## 2018-07-18 NOTE — TELEPHONE ENCOUNTER
Wife Cindy called requesting appointment with you.  Says he is has some redness, ulcers on his right leg.  Also says it is warm to the touch, thinks he may have cellulitis.  Do you want me to schedule to see Andrei on one of your clinic days, which we probably could not get him in until August, or just have him see Andrei on an off day?      7/23/18  I spoke with wife Cindy and tried to schedule him for this Thursday with Andrei [facility] PA-C, but his wife says his father passed away last night and the services are on Thursday 7/26.  He is currently scheduled for 7/31/18.  He will keep that appointment, and if legs gets worse, he will come to the ER.

## 2018-07-28 ENCOUNTER — HOSPITAL ENCOUNTER (INPATIENT)
Facility: HOSPITAL | Age: 48
LOS: 10 days | Discharge: HOME HEALTH | End: 2018-08-08
Attending: EMERGENCY MEDICINE | Admitting: INTERNAL MEDICINE

## 2018-07-28 DIAGNOSIS — L03.115 CELLULITIS OF RIGHT LOWER EXTREMITY: ICD-10-CM

## 2018-07-28 DIAGNOSIS — E11.52 TYPE 2 DIABETES MELLITUS WITH DIABETIC PERIPHERAL ANGIOPATHY AND GANGRENE, WITH LONG-TERM CURRENT USE OF INSULIN (HCC): ICD-10-CM

## 2018-07-28 DIAGNOSIS — I10 ESSENTIAL HYPERTENSION: ICD-10-CM

## 2018-07-28 DIAGNOSIS — L08.9 WOUND INFECTION: ICD-10-CM

## 2018-07-28 DIAGNOSIS — E11.622 DIABETIC ULCER OF RIGHT LOWER LEG (HCC): ICD-10-CM

## 2018-07-28 DIAGNOSIS — I96 DRY GANGRENE (HCC): Primary | ICD-10-CM

## 2018-07-28 DIAGNOSIS — L97.919 DIABETIC ULCER OF RIGHT LOWER LEG (HCC): ICD-10-CM

## 2018-07-28 DIAGNOSIS — T14.8XXA WOUND INFECTION: ICD-10-CM

## 2018-07-28 DIAGNOSIS — Z86.14 HISTORY OF MRSA INFECTION: ICD-10-CM

## 2018-07-28 DIAGNOSIS — Z74.09 IMPAIRED FUNCTIONAL MOBILITY, BALANCE, GAIT, AND ENDURANCE: ICD-10-CM

## 2018-07-28 DIAGNOSIS — Z79.4 TYPE 2 DIABETES MELLITUS WITH DIABETIC PERIPHERAL ANGIOPATHY AND GANGRENE, WITH LONG-TERM CURRENT USE OF INSULIN (HCC): ICD-10-CM

## 2018-07-28 DIAGNOSIS — L03.115 CELLULITIS OF RIGHT ANTERIOR LOWER LEG: ICD-10-CM

## 2018-07-28 LAB
ALBUMIN SERPL-MCNC: 4.05 G/DL (ref 3.2–4.8)
ALBUMIN/GLOB SERPL: 1.3 G/DL (ref 1.5–2.5)
ALP SERPL-CCNC: 134 U/L (ref 25–100)
ALT SERPL W P-5'-P-CCNC: 17 U/L (ref 7–40)
ANION GAP SERPL CALCULATED.3IONS-SCNC: 10 MMOL/L (ref 3–11)
AST SERPL-CCNC: 18 U/L (ref 0–33)
BASOPHILS # BLD AUTO: 0.03 10*3/MM3 (ref 0–0.2)
BASOPHILS NFR BLD AUTO: 0.3 % (ref 0–1)
BILIRUB SERPL-MCNC: 0.2 MG/DL (ref 0.3–1.2)
BUN BLD-MCNC: 11 MG/DL (ref 9–23)
BUN/CREAT SERPL: 10.8 (ref 7–25)
CALCIUM SPEC-SCNC: 9.1 MG/DL (ref 8.7–10.4)
CHLORIDE SERPL-SCNC: 94 MMOL/L (ref 99–109)
CO2 SERPL-SCNC: 27 MMOL/L (ref 20–31)
CREAT BLD-MCNC: 1.02 MG/DL (ref 0.6–1.3)
CRP SERPL-MCNC: 3.19 MG/DL (ref 0–1)
D-LACTATE SERPL-SCNC: 2.4 MMOL/L (ref 0.5–2)
DEPRECATED RDW RBC AUTO: 41.3 FL (ref 37–54)
EOSINOPHIL # BLD AUTO: 0.1 10*3/MM3 (ref 0–0.3)
EOSINOPHIL NFR BLD AUTO: 1.1 % (ref 0–3)
ERYTHROCYTE [DISTWIDTH] IN BLOOD BY AUTOMATED COUNT: 13.6 % (ref 11.3–14.5)
GFR SERPL CREATININE-BSD FRML MDRD: 78 ML/MIN/1.73
GLOBULIN UR ELPH-MCNC: 3.2 GM/DL
GLUCOSE BLD-MCNC: 566 MG/DL (ref 70–100)
HCT VFR BLD AUTO: 39.4 % (ref 38.9–50.9)
HGB BLD-MCNC: 13.2 G/DL (ref 13.1–17.5)
IMM GRANULOCYTES # BLD: 0.04 10*3/MM3 (ref 0–0.03)
IMM GRANULOCYTES NFR BLD: 0.4 % (ref 0–0.6)
LYMPHOCYTES # BLD AUTO: 2.21 10*3/MM3 (ref 0.6–4.8)
LYMPHOCYTES NFR BLD AUTO: 23.8 % (ref 24–44)
MCH RBC QN AUTO: 27.8 PG (ref 27–31)
MCHC RBC AUTO-ENTMCNC: 33.5 G/DL (ref 32–36)
MCV RBC AUTO: 83.1 FL (ref 80–99)
MONOCYTES # BLD AUTO: 0.46 10*3/MM3 (ref 0–1)
MONOCYTES NFR BLD AUTO: 4.9 % (ref 0–12)
NEUTROPHILS # BLD AUTO: 6.5 10*3/MM3 (ref 1.5–8.3)
NEUTROPHILS NFR BLD AUTO: 69.9 % (ref 41–71)
PLATELET # BLD AUTO: 269 10*3/MM3 (ref 150–450)
PMV BLD AUTO: 11.7 FL (ref 6–12)
POTASSIUM BLD-SCNC: 4.1 MMOL/L (ref 3.5–5.5)
PROT SERPL-MCNC: 7.2 G/DL (ref 5.7–8.2)
RBC # BLD AUTO: 4.74 10*6/MM3 (ref 4.2–5.76)
SODIUM BLD-SCNC: 131 MMOL/L (ref 132–146)
WBC NRBC COR # BLD: 9.3 10*3/MM3 (ref 3.5–10.8)

## 2018-07-28 PROCEDURE — 99285 EMERGENCY DEPT VISIT HI MDM: CPT

## 2018-07-28 PROCEDURE — 85025 COMPLETE CBC W/AUTO DIFF WBC: CPT | Performed by: PHYSICIAN ASSISTANT

## 2018-07-28 PROCEDURE — 87070 CULTURE OTHR SPECIMN AEROBIC: CPT | Performed by: PHYSICIAN ASSISTANT

## 2018-07-28 PROCEDURE — 87186 SC STD MICRODIL/AGAR DIL: CPT | Performed by: PHYSICIAN ASSISTANT

## 2018-07-28 PROCEDURE — 25010000002 HYDROMORPHONE PER 4 MG: Performed by: EMERGENCY MEDICINE

## 2018-07-28 PROCEDURE — 87040 BLOOD CULTURE FOR BACTERIA: CPT | Performed by: PHYSICIAN ASSISTANT

## 2018-07-28 PROCEDURE — 63710000001 INSULIN REGULAR HUMAN PER 5 UNITS: Performed by: PHYSICIAN ASSISTANT

## 2018-07-28 PROCEDURE — 87147 CULTURE TYPE IMMUNOLOGIC: CPT | Performed by: PHYSICIAN ASSISTANT

## 2018-07-28 PROCEDURE — 25010000002 ONDANSETRON PER 1 MG: Performed by: PHYSICIAN ASSISTANT

## 2018-07-28 PROCEDURE — 83605 ASSAY OF LACTIC ACID: CPT | Performed by: PHYSICIAN ASSISTANT

## 2018-07-28 PROCEDURE — 83880 ASSAY OF NATRIURETIC PEPTIDE: CPT | Performed by: NURSE PRACTITIONER

## 2018-07-28 PROCEDURE — 87205 SMEAR GRAM STAIN: CPT | Performed by: PHYSICIAN ASSISTANT

## 2018-07-28 PROCEDURE — 86140 C-REACTIVE PROTEIN: CPT | Performed by: PHYSICIAN ASSISTANT

## 2018-07-28 PROCEDURE — 80053 COMPREHEN METABOLIC PANEL: CPT | Performed by: PHYSICIAN ASSISTANT

## 2018-07-28 PROCEDURE — 25010000002 PIPERACILLIN SOD-TAZOBACTAM PER 1 G: Performed by: PHYSICIAN ASSISTANT

## 2018-07-28 PROCEDURE — 87077 CULTURE AEROBIC IDENTIFY: CPT | Performed by: PHYSICIAN ASSISTANT

## 2018-07-28 RX ORDER — SODIUM CHLORIDE 0.9 % (FLUSH) 0.9 %
10 SYRINGE (ML) INJECTION AS NEEDED
Status: DISCONTINUED | OUTPATIENT
Start: 2018-07-28 | End: 2018-08-08 | Stop reason: HOSPADM

## 2018-07-28 RX ORDER — ONDANSETRON 2 MG/ML
4 INJECTION INTRAMUSCULAR; INTRAVENOUS ONCE
Status: COMPLETED | OUTPATIENT
Start: 2018-07-28 | End: 2018-07-28

## 2018-07-28 RX ORDER — LISINOPRIL 20 MG/1
20 TABLET ORAL DAILY
COMMUNITY
End: 2018-08-08 | Stop reason: HOSPADM

## 2018-07-28 RX ADMIN — TAZOBACTAM SODIUM AND PIPERACILLIN SODIUM 4.5 G: 500; 4 INJECTION, SOLUTION INTRAVENOUS at 22:53

## 2018-07-28 RX ADMIN — ONDANSETRON 4 MG: 2 INJECTION INTRAMUSCULAR; INTRAVENOUS at 21:59

## 2018-07-28 RX ADMIN — HYDROMORPHONE HYDROCHLORIDE 1 MG: 1 INJECTION, SOLUTION INTRAMUSCULAR; INTRAVENOUS; SUBCUTANEOUS at 22:01

## 2018-07-28 RX ADMIN — INSULIN HUMAN 8 UNITS: 100 INJECTION, SOLUTION PARENTERAL at 22:48

## 2018-07-28 RX ADMIN — SODIUM CHLORIDE 1000 ML: 9 INJECTION, SOLUTION INTRAVENOUS at 22:50

## 2018-07-29 ENCOUNTER — APPOINTMENT (OUTPATIENT)
Dept: MRI IMAGING | Facility: HOSPITAL | Age: 48
End: 2018-07-29

## 2018-07-29 PROBLEM — E11.59 TYPE 2 DIABETES MELLITUS WITH CIRCULATORY DISORDER (HCC): Chronic | Status: ACTIVE | Noted: 2017-04-25

## 2018-07-29 PROBLEM — E11.622 DIABETIC ULCER OF RIGHT LOWER LEG (HCC): Status: ACTIVE | Noted: 2018-07-29

## 2018-07-29 PROBLEM — L03.115 CELLULITIS OF RIGHT ANTERIOR LOWER LEG: Status: ACTIVE | Noted: 2018-07-29

## 2018-07-29 PROBLEM — N17.9 AKI (ACUTE KIDNEY INJURY) (HCC): Status: ACTIVE | Noted: 2018-07-29

## 2018-07-29 PROBLEM — L97.919 DIABETIC ULCER OF RIGHT LOWER LEG (HCC): Status: ACTIVE | Noted: 2018-07-29

## 2018-07-29 LAB
ANION GAP SERPL CALCULATED.3IONS-SCNC: 9 MMOL/L (ref 3–11)
BASOPHILS # BLD AUTO: 0.04 10*3/MM3 (ref 0–0.2)
BASOPHILS NFR BLD AUTO: 0.4 % (ref 0–1)
BNP SERPL-MCNC: 72 PG/ML (ref 0–100)
BUN BLD-MCNC: 9 MG/DL (ref 9–23)
BUN/CREAT SERPL: 12.3 (ref 7–25)
CALCIUM SPEC-SCNC: 8.9 MG/DL (ref 8.7–10.4)
CHLORIDE SERPL-SCNC: 101 MMOL/L (ref 99–109)
CO2 SERPL-SCNC: 25 MMOL/L (ref 20–31)
CREAT BLD-MCNC: 0.73 MG/DL (ref 0.6–1.3)
D-LACTATE SERPL-SCNC: 2.3 MMOL/L (ref 0.5–2)
DEPRECATED RDW RBC AUTO: 41.4 FL (ref 37–54)
EOSINOPHIL # BLD AUTO: 0.14 10*3/MM3 (ref 0–0.3)
EOSINOPHIL NFR BLD AUTO: 1.5 % (ref 0–3)
ERYTHROCYTE [DISTWIDTH] IN BLOOD BY AUTOMATED COUNT: 13.8 % (ref 11.3–14.5)
ERYTHROCYTE [SEDIMENTATION RATE] IN BLOOD: 59 MM/HR (ref 0–15)
GFR SERPL CREATININE-BSD FRML MDRD: 114 ML/MIN/1.73
GLUCOSE BLD-MCNC: 368 MG/DL (ref 70–100)
GLUCOSE BLDC GLUCOMTR-MCNC: 220 MG/DL (ref 70–130)
GLUCOSE BLDC GLUCOMTR-MCNC: 314 MG/DL (ref 70–130)
GLUCOSE BLDC GLUCOMTR-MCNC: 335 MG/DL (ref 70–130)
GLUCOSE BLDC GLUCOMTR-MCNC: 353 MG/DL (ref 70–130)
GLUCOSE BLDC GLUCOMTR-MCNC: 364 MG/DL (ref 70–130)
GLUCOSE BLDC GLUCOMTR-MCNC: 412 MG/DL (ref 70–130)
GLUCOSE BLDC GLUCOMTR-MCNC: 475 MG/DL (ref 70–130)
HBA1C MFR BLD: 13.8 % (ref 4.8–5.6)
HCT VFR BLD AUTO: 38.2 % (ref 38.9–50.9)
HGB BLD-MCNC: 13 G/DL (ref 13.1–17.5)
HOLD SPECIMEN: NORMAL
IMM GRANULOCYTES # BLD: 0.03 10*3/MM3 (ref 0–0.03)
IMM GRANULOCYTES NFR BLD: 0.3 % (ref 0–0.6)
LYMPHOCYTES # BLD AUTO: 2.23 10*3/MM3 (ref 0.6–4.8)
LYMPHOCYTES NFR BLD AUTO: 23.6 % (ref 24–44)
MCH RBC QN AUTO: 28.3 PG (ref 27–31)
MCHC RBC AUTO-ENTMCNC: 34 G/DL (ref 32–36)
MCV RBC AUTO: 83.2 FL (ref 80–99)
MONOCYTES # BLD AUTO: 0.54 10*3/MM3 (ref 0–1)
MONOCYTES NFR BLD AUTO: 5.7 % (ref 0–12)
NEUTROPHILS # BLD AUTO: 6.5 10*3/MM3 (ref 1.5–8.3)
NEUTROPHILS NFR BLD AUTO: 68.8 % (ref 41–71)
PLATELET # BLD AUTO: 257 10*3/MM3 (ref 150–450)
PMV BLD AUTO: 11.8 FL (ref 6–12)
POTASSIUM BLD-SCNC: 3.7 MMOL/L (ref 3.5–5.5)
PROCALCITONIN SERPL-MCNC: 0.05 NG/ML
RBC # BLD AUTO: 4.59 10*6/MM3 (ref 4.2–5.76)
SODIUM BLD-SCNC: 135 MMOL/L (ref 132–146)
WBC NRBC COR # BLD: 9.45 10*3/MM3 (ref 3.5–10.8)

## 2018-07-29 PROCEDURE — 87070 CULTURE OTHR SPECIMN AEROBIC: CPT | Performed by: NURSE PRACTITIONER

## 2018-07-29 PROCEDURE — 80048 BASIC METABOLIC PNL TOTAL CA: CPT | Performed by: NURSE PRACTITIONER

## 2018-07-29 PROCEDURE — 83036 HEMOGLOBIN GLYCOSYLATED A1C: CPT | Performed by: NURSE PRACTITIONER

## 2018-07-29 PROCEDURE — 87205 SMEAR GRAM STAIN: CPT | Performed by: NURSE PRACTITIONER

## 2018-07-29 PROCEDURE — 83605 ASSAY OF LACTIC ACID: CPT | Performed by: PHYSICIAN ASSISTANT

## 2018-07-29 PROCEDURE — 73718 MRI LOWER EXTREMITY W/O DYE: CPT

## 2018-07-29 PROCEDURE — 25010000002 CEFTRIAXONE PER 250 MG: Performed by: NURSE PRACTITIONER

## 2018-07-29 PROCEDURE — 63710000001 INSULIN DETEMIR PER 5 UNITS: Performed by: INTERNAL MEDICINE

## 2018-07-29 PROCEDURE — 63710000001 INSULIN DETEMIR PER 5 UNITS: Performed by: NURSE PRACTITIONER

## 2018-07-29 PROCEDURE — 63710000001 INSULIN LISPRO (HUMAN) PER 5 UNITS: Performed by: INTERNAL MEDICINE

## 2018-07-29 PROCEDURE — 82962 GLUCOSE BLOOD TEST: CPT

## 2018-07-29 PROCEDURE — 85025 COMPLETE CBC W/AUTO DIFF WBC: CPT | Performed by: NURSE PRACTITIONER

## 2018-07-29 PROCEDURE — 25010000002 HEPARIN (PORCINE) PER 1000 UNITS: Performed by: NURSE PRACTITIONER

## 2018-07-29 PROCEDURE — 25010000002 VANCOMYCIN

## 2018-07-29 PROCEDURE — 25010000002 VANCOMYCIN: Performed by: PHYSICIAN ASSISTANT

## 2018-07-29 PROCEDURE — 63710000001 INSULIN LISPRO (HUMAN) PER 5 UNITS: Performed by: NURSE PRACTITIONER

## 2018-07-29 PROCEDURE — 99223 1ST HOSP IP/OBS HIGH 75: CPT | Performed by: INTERNAL MEDICINE

## 2018-07-29 PROCEDURE — 84145 PROCALCITONIN (PCT): CPT | Performed by: NURSE PRACTITIONER

## 2018-07-29 PROCEDURE — 85652 RBC SED RATE AUTOMATED: CPT | Performed by: NURSE PRACTITIONER

## 2018-07-29 PROCEDURE — 25010000002 VANCOMYCIN 10 G RECONSTITUTED SOLUTION

## 2018-07-29 RX ORDER — HYDRALAZINE HYDROCHLORIDE 20 MG/ML
20 INJECTION INTRAMUSCULAR; INTRAVENOUS EVERY 8 HOURS PRN
Status: DISCONTINUED | OUTPATIENT
Start: 2018-07-29 | End: 2018-08-08 | Stop reason: HOSPADM

## 2018-07-29 RX ORDER — HYDROCODONE BITARTRATE AND ACETAMINOPHEN 10; 325 MG/1; MG/1
1 TABLET ORAL 3 TIMES DAILY PRN
COMMUNITY
End: 2019-10-23 | Stop reason: HOSPADM

## 2018-07-29 RX ORDER — HYDROCODONE BITARTRATE AND ACETAMINOPHEN 10; 325 MG/1; MG/1
1 TABLET ORAL EVERY 4 HOURS PRN
Status: DISPENSED | OUTPATIENT
Start: 2018-07-29 | End: 2018-08-08

## 2018-07-29 RX ORDER — SODIUM CHLORIDE 0.9 % (FLUSH) 0.9 %
1-10 SYRINGE (ML) INJECTION AS NEEDED
Status: DISCONTINUED | OUTPATIENT
Start: 2018-07-29 | End: 2018-08-08 | Stop reason: HOSPADM

## 2018-07-29 RX ORDER — NICOTINE POLACRILEX 4 MG
15 LOZENGE BUCCAL
Status: DISCONTINUED | OUTPATIENT
Start: 2018-07-29 | End: 2018-08-01 | Stop reason: SDUPTHER

## 2018-07-29 RX ORDER — DOCUSATE SODIUM 100 MG/1
100 CAPSULE, LIQUID FILLED ORAL DAILY PRN
Status: DISCONTINUED | OUTPATIENT
Start: 2018-07-29 | End: 2018-08-08 | Stop reason: HOSPADM

## 2018-07-29 RX ORDER — NICOTINE POLACRILEX 4 MG
15 LOZENGE BUCCAL
Status: DISCONTINUED | OUTPATIENT
Start: 2018-07-29 | End: 2018-08-08 | Stop reason: HOSPADM

## 2018-07-29 RX ORDER — LISINOPRIL 20 MG/1
20 TABLET ORAL DAILY
Status: DISCONTINUED | OUTPATIENT
Start: 2018-07-29 | End: 2018-07-29

## 2018-07-29 RX ORDER — FAMOTIDINE 20 MG/1
20 TABLET, FILM COATED ORAL 2 TIMES DAILY PRN
Status: DISCONTINUED | OUTPATIENT
Start: 2018-07-29 | End: 2018-08-08 | Stop reason: HOSPADM

## 2018-07-29 RX ORDER — ONDANSETRON 4 MG/1
4 TABLET, FILM COATED ORAL EVERY 6 HOURS PRN
Status: DISCONTINUED | OUTPATIENT
Start: 2018-07-29 | End: 2018-08-08 | Stop reason: HOSPADM

## 2018-07-29 RX ORDER — SODIUM CHLORIDE 9 MG/ML
100 INJECTION, SOLUTION INTRAVENOUS CONTINUOUS
Status: ACTIVE | OUTPATIENT
Start: 2018-07-29 | End: 2018-07-29

## 2018-07-29 RX ORDER — DEXTROSE MONOHYDRATE 25 G/50ML
25 INJECTION, SOLUTION INTRAVENOUS
Status: DISCONTINUED | OUTPATIENT
Start: 2018-07-29 | End: 2018-08-01 | Stop reason: SDUPTHER

## 2018-07-29 RX ORDER — GABAPENTIN 400 MG/1
1200 CAPSULE ORAL 3 TIMES DAILY
Status: DISCONTINUED | OUTPATIENT
Start: 2018-07-29 | End: 2018-08-08 | Stop reason: HOSPADM

## 2018-07-29 RX ORDER — ACETAMINOPHEN 500 MG
500 TABLET ORAL 4 TIMES DAILY PRN
Status: DISCONTINUED | OUTPATIENT
Start: 2018-07-29 | End: 2018-08-08 | Stop reason: HOSPADM

## 2018-07-29 RX ORDER — DEXTROSE MONOHYDRATE 25 G/50ML
25 INJECTION, SOLUTION INTRAVENOUS
Status: DISCONTINUED | OUTPATIENT
Start: 2018-07-29 | End: 2018-08-08 | Stop reason: HOSPADM

## 2018-07-29 RX ORDER — CEFTRIAXONE SODIUM 1 G/50ML
1 INJECTION, SOLUTION INTRAVENOUS EVERY 24 HOURS
Status: DISCONTINUED | OUTPATIENT
Start: 2018-07-29 | End: 2018-08-02

## 2018-07-29 RX ORDER — LISINOPRIL 20 MG/1
40 TABLET ORAL DAILY
Status: DISCONTINUED | OUTPATIENT
Start: 2018-07-30 | End: 2018-08-08 | Stop reason: HOSPADM

## 2018-07-29 RX ORDER — ONDANSETRON 2 MG/ML
4 INJECTION INTRAMUSCULAR; INTRAVENOUS EVERY 6 HOURS PRN
Status: DISCONTINUED | OUTPATIENT
Start: 2018-07-29 | End: 2018-08-08 | Stop reason: HOSPADM

## 2018-07-29 RX ORDER — LISINOPRIL 20 MG/1
20 TABLET ORAL ONCE
Status: COMPLETED | OUTPATIENT
Start: 2018-07-29 | End: 2018-07-29

## 2018-07-29 RX ORDER — HEPARIN SODIUM 5000 [USP'U]/ML
5000 INJECTION, SOLUTION INTRAVENOUS; SUBCUTANEOUS EVERY 8 HOURS SCHEDULED
Status: DISCONTINUED | OUTPATIENT
Start: 2018-07-29 | End: 2018-07-31

## 2018-07-29 RX ADMIN — VANCOMYCIN HYDROCHLORIDE 1500 MG: 10 INJECTION, POWDER, LYOPHILIZED, FOR SOLUTION INTRAVENOUS at 23:57

## 2018-07-29 RX ADMIN — INSULIN DETEMIR 10 UNITS: 100 INJECTION, SOLUTION SUBCUTANEOUS at 05:33

## 2018-07-29 RX ADMIN — INSULIN LISPRO 15 UNITS: 100 INJECTION, SOLUTION INTRAVENOUS; SUBCUTANEOUS at 17:01

## 2018-07-29 RX ADMIN — INSULIN LISPRO 7 UNITS: 100 INJECTION, SOLUTION INTRAVENOUS; SUBCUTANEOUS at 11:55

## 2018-07-29 RX ADMIN — HEPARIN SODIUM 5000 UNITS: 5000 INJECTION, SOLUTION INTRAVENOUS; SUBCUTANEOUS at 05:30

## 2018-07-29 RX ADMIN — INSULIN LISPRO 5 UNITS: 100 INJECTION, SOLUTION INTRAVENOUS; SUBCUTANEOUS at 14:30

## 2018-07-29 RX ADMIN — SODIUM CHLORIDE 100 ML/HR: 9 INJECTION, SOLUTION INTRAVENOUS at 05:31

## 2018-07-29 RX ADMIN — HEPARIN SODIUM 5000 UNITS: 5000 INJECTION, SOLUTION INTRAVENOUS; SUBCUTANEOUS at 21:49

## 2018-07-29 RX ADMIN — CEFTRIAXONE SODIUM 1 G: 1 INJECTION, SOLUTION INTRAVENOUS at 05:31

## 2018-07-29 RX ADMIN — LISINOPRIL 20 MG: 20 TABLET ORAL at 08:14

## 2018-07-29 RX ADMIN — GABAPENTIN 1200 MG: 400 CAPSULE ORAL at 21:49

## 2018-07-29 RX ADMIN — INSULIN LISPRO 5 UNITS: 100 INJECTION, SOLUTION INTRAVENOUS; SUBCUTANEOUS at 21:49

## 2018-07-29 RX ADMIN — GABAPENTIN 1200 MG: 400 CAPSULE ORAL at 08:15

## 2018-07-29 RX ADMIN — INSULIN LISPRO 8 UNITS: 100 INJECTION, SOLUTION INTRAVENOUS; SUBCUTANEOUS at 08:15

## 2018-07-29 RX ADMIN — GABAPENTIN 1200 MG: 400 CAPSULE ORAL at 16:58

## 2018-07-29 RX ADMIN — INSULIN DETEMIR 25 UNITS: 100 INJECTION, SOLUTION SUBCUTANEOUS at 21:49

## 2018-07-29 RX ADMIN — HEPARIN SODIUM 5000 UNITS: 5000 INJECTION, SOLUTION INTRAVENOUS; SUBCUTANEOUS at 14:29

## 2018-07-29 RX ADMIN — VANCOMYCIN HYDROCHLORIDE 1500 MG: 10 INJECTION, POWDER, LYOPHILIZED, FOR SOLUTION INTRAVENOUS at 11:59

## 2018-07-29 RX ADMIN — VANCOMYCIN HYDROCHLORIDE 1500 MG: 10 INJECTION, POWDER, LYOPHILIZED, FOR SOLUTION INTRAVENOUS at 00:02

## 2018-07-29 RX ADMIN — HYDROCODONE BITARTRATE AND ACETAMINOPHEN 1 TABLET: 10; 325 TABLET ORAL at 05:30

## 2018-07-29 RX ADMIN — INSULIN LISPRO 10 UNITS: 100 INJECTION, SOLUTION INTRAVENOUS; SUBCUTANEOUS at 17:00

## 2018-07-29 RX ADMIN — LISINOPRIL 20 MG: 20 TABLET ORAL at 14:29

## 2018-07-30 ENCOUNTER — APPOINTMENT (OUTPATIENT)
Dept: CT IMAGING | Facility: HOSPITAL | Age: 48
End: 2018-07-30

## 2018-07-30 LAB
GLUCOSE BLDC GLUCOMTR-MCNC: 225 MG/DL (ref 70–130)
GLUCOSE BLDC GLUCOMTR-MCNC: 265 MG/DL (ref 70–130)
GLUCOSE BLDC GLUCOMTR-MCNC: 296 MG/DL (ref 70–130)
GLUCOSE BLDC GLUCOMTR-MCNC: 385 MG/DL (ref 70–130)
GLUCOSE BLDC GLUCOMTR-MCNC: 405 MG/DL (ref 70–130)
GLUCOSE BLDC GLUCOMTR-MCNC: 430 MG/DL (ref 70–130)
GLUCOSE BLDC GLUCOMTR-MCNC: 431 MG/DL (ref 70–130)
GLUCOSE BLDC GLUCOMTR-MCNC: 517 MG/DL (ref 70–130)
VANCOMYCIN TROUGH SERPL-MCNC: 14.4 MCG/ML (ref 10–20)

## 2018-07-30 PROCEDURE — 82962 GLUCOSE BLOOD TEST: CPT

## 2018-07-30 PROCEDURE — 75635 CT ANGIO ABDOMINAL ARTERIES: CPT

## 2018-07-30 PROCEDURE — 99221 1ST HOSP IP/OBS SF/LOW 40: CPT | Performed by: THORACIC SURGERY (CARDIOTHORACIC VASCULAR SURGERY)

## 2018-07-30 PROCEDURE — 63710000001 INSULIN DETEMIR PER 5 UNITS: Performed by: INTERNAL MEDICINE

## 2018-07-30 PROCEDURE — 25010000002 CEFTRIAXONE PER 250 MG: Performed by: NURSE PRACTITIONER

## 2018-07-30 PROCEDURE — 80202 ASSAY OF VANCOMYCIN: CPT

## 2018-07-30 PROCEDURE — 97161 PT EVAL LOW COMPLEX 20 MIN: CPT

## 2018-07-30 PROCEDURE — 25010000002 VANCOMYCIN 10 G RECONSTITUTED SOLUTION

## 2018-07-30 PROCEDURE — 25010000002 HEPARIN (PORCINE) PER 1000 UNITS: Performed by: NURSE PRACTITIONER

## 2018-07-30 PROCEDURE — 99232 SBSQ HOSP IP/OBS MODERATE 35: CPT | Performed by: INTERNAL MEDICINE

## 2018-07-30 PROCEDURE — 0 IOPAMIDOL PER 1 ML: Performed by: INTERNAL MEDICINE

## 2018-07-30 RX ORDER — SACCHAROMYCES BOULARDII 250 MG
250 CAPSULE ORAL 2 TIMES DAILY
Status: DISCONTINUED | OUTPATIENT
Start: 2018-07-30 | End: 2018-08-08 | Stop reason: HOSPADM

## 2018-07-30 RX ADMIN — IOPAMIDOL 140 ML: 755 INJECTION, SOLUTION INTRAVENOUS at 12:07

## 2018-07-30 RX ADMIN — Medication 250 MG: at 21:47

## 2018-07-30 RX ADMIN — INSULIN LISPRO 15 UNITS: 100 INJECTION, SOLUTION INTRAVENOUS; SUBCUTANEOUS at 08:35

## 2018-07-30 RX ADMIN — INSULIN LISPRO 15 UNITS: 100 INJECTION, SOLUTION INTRAVENOUS; SUBCUTANEOUS at 17:47

## 2018-07-30 RX ADMIN — CEFTRIAXONE SODIUM 1 G: 1 INJECTION, SOLUTION INTRAVENOUS at 05:20

## 2018-07-30 RX ADMIN — INSULIN LISPRO 12 UNITS: 100 INJECTION, SOLUTION INTRAVENOUS; SUBCUTANEOUS at 08:36

## 2018-07-30 RX ADMIN — HEPARIN SODIUM 5000 UNITS: 5000 INJECTION, SOLUTION INTRAVENOUS; SUBCUTANEOUS at 14:36

## 2018-07-30 RX ADMIN — INSULIN LISPRO 5 UNITS: 100 INJECTION, SOLUTION INTRAVENOUS; SUBCUTANEOUS at 17:47

## 2018-07-30 RX ADMIN — INSULIN DETEMIR 30 UNITS: 100 INJECTION, SOLUTION SUBCUTANEOUS at 21:48

## 2018-07-30 RX ADMIN — GABAPENTIN 1200 MG: 400 CAPSULE ORAL at 21:47

## 2018-07-30 RX ADMIN — HEPARIN SODIUM 5000 UNITS: 5000 INJECTION, SOLUTION INTRAVENOUS; SUBCUTANEOUS at 05:21

## 2018-07-30 RX ADMIN — LISINOPRIL 40 MG: 20 TABLET ORAL at 08:35

## 2018-07-30 RX ADMIN — INSULIN LISPRO 15 UNITS: 100 INJECTION, SOLUTION INTRAVENOUS; SUBCUTANEOUS at 12:39

## 2018-07-30 RX ADMIN — HYDROCODONE BITARTRATE AND ACETAMINOPHEN 1 TABLET: 10; 325 TABLET ORAL at 14:42

## 2018-07-30 RX ADMIN — VANCOMYCIN HYDROCHLORIDE 1500 MG: 10 INJECTION, POWDER, LYOPHILIZED, FOR SOLUTION INTRAVENOUS at 12:38

## 2018-07-30 RX ADMIN — HEPARIN SODIUM 5000 UNITS: 5000 INJECTION, SOLUTION INTRAVENOUS; SUBCUTANEOUS at 21:47

## 2018-07-30 RX ADMIN — Medication 250 MG: at 08:35

## 2018-07-30 RX ADMIN — INSULIN LISPRO 14 UNITS: 100 INJECTION, SOLUTION INTRAVENOUS; SUBCUTANEOUS at 21:47

## 2018-07-30 RX ADMIN — INSULIN LISPRO 8 UNITS: 100 INJECTION, SOLUTION INTRAVENOUS; SUBCUTANEOUS at 12:39

## 2018-07-30 RX ADMIN — GABAPENTIN 1200 MG: 400 CAPSULE ORAL at 08:35

## 2018-07-30 RX ADMIN — GABAPENTIN 1200 MG: 400 CAPSULE ORAL at 16:16

## 2018-07-31 LAB
ALBUMIN SERPL-MCNC: 3.28 G/DL (ref 3.2–4.8)
ALBUMIN/GLOB SERPL: 1.4 G/DL (ref 1.5–2.5)
ALP SERPL-CCNC: 118 U/L (ref 25–100)
ALT SERPL W P-5'-P-CCNC: 18 U/L (ref 7–40)
ANION GAP SERPL CALCULATED.3IONS-SCNC: 10 MMOL/L (ref 3–11)
AST SERPL-CCNC: 15 U/L (ref 0–33)
BACTERIA SPEC AEROBE CULT: ABNORMAL
BACTERIA SPEC AEROBE CULT: NORMAL
BILIRUB SERPL-MCNC: 0.2 MG/DL (ref 0.3–1.2)
BUN BLD-MCNC: 14 MG/DL (ref 9–23)
BUN/CREAT SERPL: 23.3 (ref 7–25)
CALCIUM SPEC-SCNC: 8.3 MG/DL (ref 8.7–10.4)
CHLORIDE SERPL-SCNC: 104 MMOL/L (ref 99–109)
CO2 SERPL-SCNC: 23 MMOL/L (ref 20–31)
CREAT BLD-MCNC: 0.6 MG/DL (ref 0.6–1.3)
DEPRECATED RDW RBC AUTO: 42.5 FL (ref 37–54)
ERYTHROCYTE [DISTWIDTH] IN BLOOD BY AUTOMATED COUNT: 13.9 % (ref 11.3–14.5)
GFR SERPL CREATININE-BSD FRML MDRD: 143 ML/MIN/1.73
GLOBULIN UR ELPH-MCNC: 2.3 GM/DL
GLUCOSE BLD-MCNC: 288 MG/DL (ref 70–100)
GLUCOSE BLDC GLUCOMTR-MCNC: 271 MG/DL (ref 70–130)
GLUCOSE BLDC GLUCOMTR-MCNC: 280 MG/DL (ref 70–130)
GLUCOSE BLDC GLUCOMTR-MCNC: 332 MG/DL (ref 70–130)
GLUCOSE BLDC GLUCOMTR-MCNC: 395 MG/DL (ref 70–130)
GRAM STN SPEC: ABNORMAL
GRAM STN SPEC: ABNORMAL
GRAM STN SPEC: NORMAL
HCT VFR BLD AUTO: 35.3 % (ref 38.9–50.9)
HGB BLD-MCNC: 11.5 G/DL (ref 13.1–17.5)
MCH RBC QN AUTO: 27.4 PG (ref 27–31)
MCHC RBC AUTO-ENTMCNC: 32.6 G/DL (ref 32–36)
MCV RBC AUTO: 84 FL (ref 80–99)
PLATELET # BLD AUTO: 210 10*3/MM3 (ref 150–450)
PMV BLD AUTO: 11.6 FL (ref 6–12)
POTASSIUM BLD-SCNC: 4.4 MMOL/L (ref 3.5–5.5)
PROT SERPL-MCNC: 5.6 G/DL (ref 5.7–8.2)
RBC # BLD AUTO: 4.2 10*6/MM3 (ref 4.2–5.76)
SODIUM BLD-SCNC: 137 MMOL/L (ref 132–146)
WBC NRBC COR # BLD: 8.07 10*3/MM3 (ref 3.5–10.8)

## 2018-07-31 PROCEDURE — 99232 SBSQ HOSP IP/OBS MODERATE 35: CPT | Performed by: THORACIC SURGERY (CARDIOTHORACIC VASCULAR SURGERY)

## 2018-07-31 PROCEDURE — 82962 GLUCOSE BLOOD TEST: CPT

## 2018-07-31 PROCEDURE — 80053 COMPREHEN METABOLIC PANEL: CPT | Performed by: INTERNAL MEDICINE

## 2018-07-31 PROCEDURE — 99232 SBSQ HOSP IP/OBS MODERATE 35: CPT | Performed by: INTERNAL MEDICINE

## 2018-07-31 PROCEDURE — 25010000002 LINEZOLID 600 MG/300ML SOLUTION: Performed by: INTERNAL MEDICINE

## 2018-07-31 PROCEDURE — 25010000002 CEFTRIAXONE PER 250 MG: Performed by: NURSE PRACTITIONER

## 2018-07-31 PROCEDURE — 25010000002 HEPARIN (PORCINE) PER 1000 UNITS: Performed by: NURSE PRACTITIONER

## 2018-07-31 PROCEDURE — 25010000002 ENOXAPARIN PER 10 MG: Performed by: INTERNAL MEDICINE

## 2018-07-31 PROCEDURE — 85027 COMPLETE CBC AUTOMATED: CPT | Performed by: INTERNAL MEDICINE

## 2018-07-31 PROCEDURE — 63710000001 INSULIN DETEMIR PER 5 UNITS: Performed by: INTERNAL MEDICINE

## 2018-07-31 PROCEDURE — 25010000002 VANCOMYCIN 10 G RECONSTITUTED SOLUTION

## 2018-07-31 RX ORDER — CLONIDINE HYDROCHLORIDE 0.1 MG/1
0.1 TABLET ORAL EVERY 8 HOURS SCHEDULED
Status: DISCONTINUED | OUTPATIENT
Start: 2018-07-31 | End: 2018-08-08 | Stop reason: HOSPADM

## 2018-07-31 RX ORDER — LINEZOLID 2 MG/ML
600 INJECTION, SOLUTION INTRAVENOUS EVERY 12 HOURS
Status: DISCONTINUED | OUTPATIENT
Start: 2018-07-31 | End: 2018-08-08 | Stop reason: HOSPADM

## 2018-07-31 RX ADMIN — CLONIDINE HYDROCHLORIDE 0.1 MG: 0.1 TABLET ORAL at 15:11

## 2018-07-31 RX ADMIN — INSULIN LISPRO 15 UNITS: 100 INJECTION, SOLUTION INTRAVENOUS; SUBCUTANEOUS at 11:47

## 2018-07-31 RX ADMIN — Medication 250 MG: at 21:50

## 2018-07-31 RX ADMIN — GABAPENTIN 1200 MG: 400 CAPSULE ORAL at 21:50

## 2018-07-31 RX ADMIN — INSULIN LISPRO 8 UNITS: 100 INJECTION, SOLUTION INTRAVENOUS; SUBCUTANEOUS at 21:50

## 2018-07-31 RX ADMIN — CLONIDINE HYDROCHLORIDE 0.1 MG: 0.1 TABLET ORAL at 21:50

## 2018-07-31 RX ADMIN — INSULIN LISPRO 15 UNITS: 100 INJECTION, SOLUTION INTRAVENOUS; SUBCUTANEOUS at 08:20

## 2018-07-31 RX ADMIN — VANCOMYCIN HYDROCHLORIDE 1500 MG: 10 INJECTION, POWDER, LYOPHILIZED, FOR SOLUTION INTRAVENOUS at 11:34

## 2018-07-31 RX ADMIN — LINEZOLID 600 MG: 600 INJECTION, SOLUTION INTRAVENOUS at 17:12

## 2018-07-31 RX ADMIN — HEPARIN SODIUM 5000 UNITS: 5000 INJECTION, SOLUTION INTRAVENOUS; SUBCUTANEOUS at 13:52

## 2018-07-31 RX ADMIN — GABAPENTIN 1200 MG: 400 CAPSULE ORAL at 08:20

## 2018-07-31 RX ADMIN — INSULIN LISPRO 8 UNITS: 100 INJECTION, SOLUTION INTRAVENOUS; SUBCUTANEOUS at 11:48

## 2018-07-31 RX ADMIN — INSULIN LISPRO 8 UNITS: 100 INJECTION, SOLUTION INTRAVENOUS; SUBCUTANEOUS at 17:10

## 2018-07-31 RX ADMIN — HEPARIN SODIUM 5000 UNITS: 5000 INJECTION, SOLUTION INTRAVENOUS; SUBCUTANEOUS at 06:13

## 2018-07-31 RX ADMIN — VANCOMYCIN HYDROCHLORIDE 1500 MG: 10 INJECTION, POWDER, LYOPHILIZED, FOR SOLUTION INTRAVENOUS at 00:13

## 2018-07-31 RX ADMIN — INSULIN LISPRO 15 UNITS: 100 INJECTION, SOLUTION INTRAVENOUS; SUBCUTANEOUS at 17:10

## 2018-07-31 RX ADMIN — INSULIN LISPRO 10 UNITS: 100 INJECTION, SOLUTION INTRAVENOUS; SUBCUTANEOUS at 08:20

## 2018-07-31 RX ADMIN — INSULIN DETEMIR 30 UNITS: 100 INJECTION, SOLUTION SUBCUTANEOUS at 21:51

## 2018-07-31 RX ADMIN — LISINOPRIL 40 MG: 20 TABLET ORAL at 08:19

## 2018-07-31 RX ADMIN — CEFTRIAXONE SODIUM 1 G: 1 INJECTION, SOLUTION INTRAVENOUS at 06:13

## 2018-07-31 RX ADMIN — GABAPENTIN 1200 MG: 400 CAPSULE ORAL at 15:12

## 2018-07-31 RX ADMIN — Medication 250 MG: at 08:19

## 2018-07-31 RX ADMIN — ENOXAPARIN SODIUM 40 MG: 40 INJECTION SUBCUTANEOUS at 21:49

## 2018-08-01 LAB
GLUCOSE BLDC GLUCOMTR-MCNC: 344 MG/DL (ref 70–130)
GLUCOSE BLDC GLUCOMTR-MCNC: 353 MG/DL (ref 70–130)
GLUCOSE BLDC GLUCOMTR-MCNC: 356 MG/DL (ref 70–130)
GLUCOSE BLDC GLUCOMTR-MCNC: 391 MG/DL (ref 70–130)
GLUCOSE BLDC GLUCOMTR-MCNC: 422 MG/DL (ref 70–130)

## 2018-08-01 PROCEDURE — 97116 GAIT TRAINING THERAPY: CPT

## 2018-08-01 PROCEDURE — 25010000002 CEFTRIAXONE PER 250 MG: Performed by: NURSE PRACTITIONER

## 2018-08-01 PROCEDURE — 25010000002 ENOXAPARIN PER 10 MG: Performed by: INTERNAL MEDICINE

## 2018-08-01 PROCEDURE — 99233 SBSQ HOSP IP/OBS HIGH 50: CPT | Performed by: INTERNAL MEDICINE

## 2018-08-01 PROCEDURE — 82962 GLUCOSE BLOOD TEST: CPT

## 2018-08-01 PROCEDURE — 97110 THERAPEUTIC EXERCISES: CPT

## 2018-08-01 PROCEDURE — 25010000002 LINEZOLID 600 MG/300ML SOLUTION: Performed by: INTERNAL MEDICINE

## 2018-08-01 PROCEDURE — 63710000001 INSULIN DETEMIR PER 5 UNITS: Performed by: INTERNAL MEDICINE

## 2018-08-01 RX ADMIN — INSULIN DETEMIR 30 UNITS: 100 INJECTION, SOLUTION SUBCUTANEOUS at 21:03

## 2018-08-01 RX ADMIN — CLONIDINE HYDROCHLORIDE 0.1 MG: 0.1 TABLET ORAL at 21:00

## 2018-08-01 RX ADMIN — LINEZOLID 600 MG: 600 INJECTION, SOLUTION INTRAVENOUS at 08:22

## 2018-08-01 RX ADMIN — HYDROCODONE BITARTRATE AND ACETAMINOPHEN 1 TABLET: 10; 325 TABLET ORAL at 09:06

## 2018-08-01 RX ADMIN — CLONIDINE HYDROCHLORIDE 0.1 MG: 0.1 TABLET ORAL at 06:35

## 2018-08-01 RX ADMIN — Medication 250 MG: at 20:57

## 2018-08-01 RX ADMIN — LISINOPRIL 40 MG: 20 TABLET ORAL at 08:19

## 2018-08-01 RX ADMIN — ENOXAPARIN SODIUM 40 MG: 40 INJECTION SUBCUTANEOUS at 20:57

## 2018-08-01 RX ADMIN — INSULIN LISPRO 10 UNITS: 100 INJECTION, SOLUTION INTRAVENOUS; SUBCUTANEOUS at 12:36

## 2018-08-01 RX ADMIN — GABAPENTIN 1200 MG: 400 CAPSULE ORAL at 20:57

## 2018-08-01 RX ADMIN — INSULIN LISPRO 15 UNITS: 100 INJECTION, SOLUTION INTRAVENOUS; SUBCUTANEOUS at 08:20

## 2018-08-01 RX ADMIN — GABAPENTIN 1200 MG: 400 CAPSULE ORAL at 08:19

## 2018-08-01 RX ADMIN — INSULIN LISPRO 15 UNITS: 100 INJECTION, SOLUTION INTRAVENOUS; SUBCUTANEOUS at 17:29

## 2018-08-01 RX ADMIN — LINEZOLID 600 MG: 600 INJECTION, SOLUTION INTRAVENOUS at 17:28

## 2018-08-01 RX ADMIN — INSULIN LISPRO 12 UNITS: 100 INJECTION, SOLUTION INTRAVENOUS; SUBCUTANEOUS at 20:58

## 2018-08-01 RX ADMIN — INSULIN LISPRO 12 UNITS: 100 INJECTION, SOLUTION INTRAVENOUS; SUBCUTANEOUS at 08:20

## 2018-08-01 RX ADMIN — Medication 250 MG: at 08:19

## 2018-08-01 RX ADMIN — CEFTRIAXONE SODIUM 1 G: 1 INJECTION, SOLUTION INTRAVENOUS at 06:33

## 2018-08-01 RX ADMIN — INSULIN LISPRO 12 UNITS: 100 INJECTION, SOLUTION INTRAVENOUS; SUBCUTANEOUS at 17:30

## 2018-08-01 RX ADMIN — INSULIN LISPRO 15 UNITS: 100 INJECTION, SOLUTION INTRAVENOUS; SUBCUTANEOUS at 12:36

## 2018-08-01 RX ADMIN — CLONIDINE HYDROCHLORIDE 0.1 MG: 0.1 TABLET ORAL at 15:07

## 2018-08-01 RX ADMIN — GABAPENTIN 1200 MG: 400 CAPSULE ORAL at 16:00

## 2018-08-02 LAB
BACTERIA SPEC AEROBE CULT: NORMAL
BACTERIA SPEC AEROBE CULT: NORMAL
GLUCOSE BLDC GLUCOMTR-MCNC: 331 MG/DL (ref 70–130)
GLUCOSE BLDC GLUCOMTR-MCNC: 369 MG/DL (ref 70–130)
GLUCOSE BLDC GLUCOMTR-MCNC: 420 MG/DL (ref 70–130)
GLUCOSE BLDC GLUCOMTR-MCNC: 487 MG/DL (ref 70–130)
GLUCOSE BLDC GLUCOMTR-MCNC: 504 MG/DL (ref 70–130)
GLUCOSE BLDC GLUCOMTR-MCNC: 554 MG/DL (ref 70–130)

## 2018-08-02 PROCEDURE — G0108 DIAB MANAGE TRN  PER INDIV: HCPCS | Performed by: REGISTERED NURSE

## 2018-08-02 PROCEDURE — 63710000001 INSULIN DETEMIR PER 5 UNITS: Performed by: INTERNAL MEDICINE

## 2018-08-02 PROCEDURE — 25010000002 ENOXAPARIN PER 10 MG: Performed by: INTERNAL MEDICINE

## 2018-08-02 PROCEDURE — 25010000002 CEFTRIAXONE PER 250 MG: Performed by: NURSE PRACTITIONER

## 2018-08-02 PROCEDURE — 25010000002 HYDRALAZINE PER 20 MG: Performed by: INTERNAL MEDICINE

## 2018-08-02 PROCEDURE — 25010000002 LINEZOLID 600 MG/300ML SOLUTION: Performed by: INTERNAL MEDICINE

## 2018-08-02 PROCEDURE — 63710000001 INSULIN LISPRO (HUMAN) PER 5 UNITS: Performed by: NURSE PRACTITIONER

## 2018-08-02 PROCEDURE — 82962 GLUCOSE BLOOD TEST: CPT

## 2018-08-02 PROCEDURE — 99233 SBSQ HOSP IP/OBS HIGH 50: CPT | Performed by: INTERNAL MEDICINE

## 2018-08-02 RX ADMIN — CLONIDINE HYDROCHLORIDE 0.1 MG: 0.1 TABLET ORAL at 20:56

## 2018-08-02 RX ADMIN — INSULIN LISPRO 15 UNITS: 100 INJECTION, SOLUTION INTRAVENOUS; SUBCUTANEOUS at 08:31

## 2018-08-02 RX ADMIN — INSULIN LISPRO 12 UNITS: 100 INJECTION, SOLUTION INTRAVENOUS; SUBCUTANEOUS at 20:56

## 2018-08-02 RX ADMIN — CEFTRIAXONE SODIUM 1 G: 1 INJECTION, SOLUTION INTRAVENOUS at 05:22

## 2018-08-02 RX ADMIN — CLONIDINE HYDROCHLORIDE 0.1 MG: 0.1 TABLET ORAL at 15:20

## 2018-08-02 RX ADMIN — INSULIN DETEMIR 30 UNITS: 100 INJECTION, SOLUTION SUBCUTANEOUS at 20:57

## 2018-08-02 RX ADMIN — INSULIN LISPRO 10 UNITS: 100 INJECTION, SOLUTION INTRAVENOUS; SUBCUTANEOUS at 17:29

## 2018-08-02 RX ADMIN — INSULIN LISPRO 14 UNITS: 100 INJECTION, SOLUTION INTRAVENOUS; SUBCUTANEOUS at 08:31

## 2018-08-02 RX ADMIN — GABAPENTIN 1200 MG: 400 CAPSULE ORAL at 20:56

## 2018-08-02 RX ADMIN — GABAPENTIN 1200 MG: 400 CAPSULE ORAL at 08:30

## 2018-08-02 RX ADMIN — LINEZOLID 600 MG: 600 INJECTION, SOLUTION INTRAVENOUS at 05:22

## 2018-08-02 RX ADMIN — INSULIN LISPRO 14 UNITS: 100 INJECTION, SOLUTION INTRAVENOUS; SUBCUTANEOUS at 12:34

## 2018-08-02 RX ADMIN — ENOXAPARIN SODIUM 40 MG: 40 INJECTION SUBCUTANEOUS at 20:56

## 2018-08-02 RX ADMIN — LISINOPRIL 40 MG: 20 TABLET ORAL at 08:30

## 2018-08-02 RX ADMIN — INSULIN LISPRO 15 UNITS: 100 INJECTION, SOLUTION INTRAVENOUS; SUBCUTANEOUS at 17:29

## 2018-08-02 RX ADMIN — GABAPENTIN 1200 MG: 400 CAPSULE ORAL at 15:20

## 2018-08-02 RX ADMIN — INSULIN LISPRO 14 UNITS: 100 INJECTION, SOLUTION INTRAVENOUS; SUBCUTANEOUS at 00:15

## 2018-08-02 RX ADMIN — CLONIDINE HYDROCHLORIDE 0.1 MG: 0.1 TABLET ORAL at 05:22

## 2018-08-02 RX ADMIN — LINEZOLID 600 MG: 600 INJECTION, SOLUTION INTRAVENOUS at 17:28

## 2018-08-02 RX ADMIN — HYDRALAZINE HYDROCHLORIDE 20 MG: 20 INJECTION INTRAMUSCULAR; INTRAVENOUS at 00:16

## 2018-08-02 RX ADMIN — Medication 250 MG: at 08:30

## 2018-08-02 RX ADMIN — INSULIN LISPRO 15 UNITS: 100 INJECTION, SOLUTION INTRAVENOUS; SUBCUTANEOUS at 12:34

## 2018-08-02 RX ADMIN — Medication 250 MG: at 20:56

## 2018-08-03 LAB
GLUCOSE BLDC GLUCOMTR-MCNC: 339 MG/DL (ref 70–130)
GLUCOSE BLDC GLUCOMTR-MCNC: 353 MG/DL (ref 70–130)
GLUCOSE BLDC GLUCOMTR-MCNC: 459 MG/DL (ref 70–130)
GLUCOSE BLDC GLUCOMTR-MCNC: 512 MG/DL (ref 70–130)
GLUCOSE BLDC GLUCOMTR-MCNC: 571 MG/DL (ref 70–130)

## 2018-08-03 PROCEDURE — 25010000002 LINEZOLID 600 MG/300ML SOLUTION: Performed by: INTERNAL MEDICINE

## 2018-08-03 PROCEDURE — 82962 GLUCOSE BLOOD TEST: CPT

## 2018-08-03 PROCEDURE — 25010000002 ENOXAPARIN PER 10 MG: Performed by: INTERNAL MEDICINE

## 2018-08-03 PROCEDURE — 99233 SBSQ HOSP IP/OBS HIGH 50: CPT | Performed by: INTERNAL MEDICINE

## 2018-08-03 PROCEDURE — 97116 GAIT TRAINING THERAPY: CPT

## 2018-08-03 PROCEDURE — 63710000001 INSULIN DETEMIR PER 5 UNITS: Performed by: INTERNAL MEDICINE

## 2018-08-03 PROCEDURE — 63710000001 INSULIN LISPRO (HUMAN) PER 5 UNITS: Performed by: INTERNAL MEDICINE

## 2018-08-03 RX ORDER — ALPRAZOLAM 0.5 MG/1
0.5 TABLET ORAL 2 TIMES DAILY PRN
Status: DISCONTINUED | OUTPATIENT
Start: 2018-08-03 | End: 2018-08-08 | Stop reason: HOSPADM

## 2018-08-03 RX ADMIN — INSULIN DETEMIR 30 UNITS: 100 INJECTION, SOLUTION SUBCUTANEOUS at 20:47

## 2018-08-03 RX ADMIN — CLONIDINE HYDROCHLORIDE 0.1 MG: 0.1 TABLET ORAL at 05:14

## 2018-08-03 RX ADMIN — INSULIN LISPRO 14 UNITS: 100 INJECTION, SOLUTION INTRAVENOUS; SUBCUTANEOUS at 11:54

## 2018-08-03 RX ADMIN — INSULIN LISPRO 10 UNITS: 100 INJECTION, SOLUTION INTRAVENOUS; SUBCUTANEOUS at 20:46

## 2018-08-03 RX ADMIN — INSULIN DETEMIR 10 UNITS: 100 INJECTION, SOLUTION SUBCUTANEOUS at 09:33

## 2018-08-03 RX ADMIN — LINEZOLID 600 MG: 600 INJECTION, SOLUTION INTRAVENOUS at 05:14

## 2018-08-03 RX ADMIN — INSULIN LISPRO 14 UNITS: 100 INJECTION, SOLUTION INTRAVENOUS; SUBCUTANEOUS at 08:46

## 2018-08-03 RX ADMIN — GABAPENTIN 1200 MG: 400 CAPSULE ORAL at 08:46

## 2018-08-03 RX ADMIN — INSULIN LISPRO 12 UNITS: 100 INJECTION, SOLUTION INTRAVENOUS; SUBCUTANEOUS at 17:09

## 2018-08-03 RX ADMIN — LINEZOLID 600 MG: 600 INJECTION, SOLUTION INTRAVENOUS at 17:09

## 2018-08-03 RX ADMIN — GABAPENTIN 1200 MG: 400 CAPSULE ORAL at 15:21

## 2018-08-03 RX ADMIN — INSULIN DETEMIR 20 UNITS: 100 INJECTION, SOLUTION SUBCUTANEOUS at 11:53

## 2018-08-03 RX ADMIN — ENOXAPARIN SODIUM 40 MG: 40 INJECTION SUBCUTANEOUS at 20:47

## 2018-08-03 RX ADMIN — Medication 250 MG: at 20:46

## 2018-08-03 RX ADMIN — Medication 250 MG: at 08:47

## 2018-08-03 RX ADMIN — INSULIN LISPRO 15 UNITS: 100 INJECTION, SOLUTION INTRAVENOUS; SUBCUTANEOUS at 11:54

## 2018-08-03 RX ADMIN — CLONIDINE HYDROCHLORIDE 0.1 MG: 0.1 TABLET ORAL at 15:21

## 2018-08-03 RX ADMIN — GABAPENTIN 1200 MG: 400 CAPSULE ORAL at 20:46

## 2018-08-03 RX ADMIN — ALPRAZOLAM 0.5 MG: 0.5 TABLET ORAL at 08:56

## 2018-08-03 RX ADMIN — LISINOPRIL 40 MG: 20 TABLET ORAL at 08:46

## 2018-08-03 RX ADMIN — INSULIN LISPRO 15 UNITS: 100 INJECTION, SOLUTION INTRAVENOUS; SUBCUTANEOUS at 17:09

## 2018-08-03 RX ADMIN — CLONIDINE HYDROCHLORIDE 0.1 MG: 0.1 TABLET ORAL at 20:46

## 2018-08-04 LAB
GLUCOSE BLDC GLUCOMTR-MCNC: 383 MG/DL (ref 70–130)
GLUCOSE BLDC GLUCOMTR-MCNC: 386 MG/DL (ref 70–130)
GLUCOSE BLDC GLUCOMTR-MCNC: 391 MG/DL (ref 70–130)
GLUCOSE BLDC GLUCOMTR-MCNC: 511 MG/DL (ref 70–130)

## 2018-08-04 PROCEDURE — 82962 GLUCOSE BLOOD TEST: CPT

## 2018-08-04 PROCEDURE — 99233 SBSQ HOSP IP/OBS HIGH 50: CPT | Performed by: INTERNAL MEDICINE

## 2018-08-04 PROCEDURE — 25010000002 ENOXAPARIN PER 10 MG: Performed by: INTERNAL MEDICINE

## 2018-08-04 PROCEDURE — 63710000001 INSULIN DETEMIR PER 5 UNITS: Performed by: INTERNAL MEDICINE

## 2018-08-04 PROCEDURE — 25010000002 LINEZOLID 600 MG/300ML SOLUTION: Performed by: INTERNAL MEDICINE

## 2018-08-04 RX ADMIN — INSULIN LISPRO 15 UNITS: 100 INJECTION, SOLUTION INTRAVENOUS; SUBCUTANEOUS at 17:25

## 2018-08-04 RX ADMIN — CLONIDINE HYDROCHLORIDE 0.1 MG: 0.1 TABLET ORAL at 05:26

## 2018-08-04 RX ADMIN — INSULIN DETEMIR 50 UNITS: 100 INJECTION, SOLUTION SUBCUTANEOUS at 20:30

## 2018-08-04 RX ADMIN — CLONIDINE HYDROCHLORIDE 0.1 MG: 0.1 TABLET ORAL at 15:24

## 2018-08-04 RX ADMIN — Medication 250 MG: at 20:31

## 2018-08-04 RX ADMIN — Medication 250 MG: at 08:34

## 2018-08-04 RX ADMIN — INSULIN LISPRO 14 UNITS: 100 INJECTION, SOLUTION INTRAVENOUS; SUBCUTANEOUS at 08:35

## 2018-08-04 RX ADMIN — INSULIN LISPRO 12 UNITS: 100 INJECTION, SOLUTION INTRAVENOUS; SUBCUTANEOUS at 17:24

## 2018-08-04 RX ADMIN — INSULIN LISPRO 15 UNITS: 100 INJECTION, SOLUTION INTRAVENOUS; SUBCUTANEOUS at 11:48

## 2018-08-04 RX ADMIN — GABAPENTIN 1200 MG: 400 CAPSULE ORAL at 15:25

## 2018-08-04 RX ADMIN — LINEZOLID 600 MG: 600 INJECTION, SOLUTION INTRAVENOUS at 17:24

## 2018-08-04 RX ADMIN — INSULIN LISPRO 12 UNITS: 100 INJECTION, SOLUTION INTRAVENOUS; SUBCUTANEOUS at 11:49

## 2018-08-04 RX ADMIN — INSULIN LISPRO 15 UNITS: 100 INJECTION, SOLUTION INTRAVENOUS; SUBCUTANEOUS at 08:36

## 2018-08-04 RX ADMIN — ENOXAPARIN SODIUM 40 MG: 40 INJECTION SUBCUTANEOUS at 20:31

## 2018-08-04 RX ADMIN — LISINOPRIL 40 MG: 20 TABLET ORAL at 08:34

## 2018-08-04 RX ADMIN — GABAPENTIN 1200 MG: 400 CAPSULE ORAL at 20:31

## 2018-08-04 RX ADMIN — GABAPENTIN 1200 MG: 400 CAPSULE ORAL at 08:34

## 2018-08-04 RX ADMIN — INSULIN LISPRO 12 UNITS: 100 INJECTION, SOLUTION INTRAVENOUS; SUBCUTANEOUS at 20:30

## 2018-08-04 RX ADMIN — LINEZOLID 600 MG: 600 INJECTION, SOLUTION INTRAVENOUS at 05:26

## 2018-08-04 RX ADMIN — INSULIN DETEMIR 50 UNITS: 100 INJECTION, SOLUTION SUBCUTANEOUS at 09:42

## 2018-08-04 RX ADMIN — CLONIDINE HYDROCHLORIDE 0.1 MG: 0.1 TABLET ORAL at 20:31

## 2018-08-05 LAB
GLUCOSE BLDC GLUCOMTR-MCNC: 329 MG/DL (ref 70–130)
GLUCOSE BLDC GLUCOMTR-MCNC: 368 MG/DL (ref 70–130)
GLUCOSE BLDC GLUCOMTR-MCNC: 370 MG/DL (ref 70–130)
GLUCOSE BLDC GLUCOMTR-MCNC: 379 MG/DL (ref 70–130)
GLUCOSE BLDC GLUCOMTR-MCNC: 444 MG/DL (ref 70–130)

## 2018-08-05 PROCEDURE — 25010000002 LINEZOLID 600 MG/300ML SOLUTION: Performed by: INTERNAL MEDICINE

## 2018-08-05 PROCEDURE — 25010000002 HYDRALAZINE PER 20 MG: Performed by: INTERNAL MEDICINE

## 2018-08-05 PROCEDURE — 63710000001 INSULIN DETEMIR PER 5 UNITS: Performed by: INTERNAL MEDICINE

## 2018-08-05 PROCEDURE — 99233 SBSQ HOSP IP/OBS HIGH 50: CPT | Performed by: INTERNAL MEDICINE

## 2018-08-05 PROCEDURE — 97116 GAIT TRAINING THERAPY: CPT

## 2018-08-05 PROCEDURE — 25010000002 ENOXAPARIN PER 10 MG: Performed by: INTERNAL MEDICINE

## 2018-08-05 PROCEDURE — 82962 GLUCOSE BLOOD TEST: CPT

## 2018-08-05 PROCEDURE — 63710000001 INSULIN LISPRO (HUMAN) PER 5 UNITS: Performed by: INTERNAL MEDICINE

## 2018-08-05 RX ADMIN — GABAPENTIN 1200 MG: 400 CAPSULE ORAL at 22:38

## 2018-08-05 RX ADMIN — INSULIN LISPRO 15 UNITS: 100 INJECTION, SOLUTION INTRAVENOUS; SUBCUTANEOUS at 12:15

## 2018-08-05 RX ADMIN — INSULIN DETEMIR 10 UNITS: 100 INJECTION, SOLUTION SUBCUTANEOUS at 13:48

## 2018-08-05 RX ADMIN — INSULIN LISPRO 15 UNITS: 100 INJECTION, SOLUTION INTRAVENOUS; SUBCUTANEOUS at 07:53

## 2018-08-05 RX ADMIN — ENOXAPARIN SODIUM 40 MG: 40 INJECTION SUBCUTANEOUS at 22:39

## 2018-08-05 RX ADMIN — Medication 250 MG: at 22:38

## 2018-08-05 RX ADMIN — LINEZOLID 600 MG: 600 INJECTION, SOLUTION INTRAVENOUS at 05:33

## 2018-08-05 RX ADMIN — HYDRALAZINE HYDROCHLORIDE 20 MG: 20 INJECTION INTRAMUSCULAR; INTRAVENOUS at 07:53

## 2018-08-05 RX ADMIN — GABAPENTIN 1200 MG: 400 CAPSULE ORAL at 16:53

## 2018-08-05 RX ADMIN — LINEZOLID 600 MG: 600 INJECTION, SOLUTION INTRAVENOUS at 17:21

## 2018-08-05 RX ADMIN — CLONIDINE HYDROCHLORIDE 0.1 MG: 0.1 TABLET ORAL at 13:47

## 2018-08-05 RX ADMIN — INSULIN LISPRO 24 UNITS: 100 INJECTION, SOLUTION INTRAVENOUS; SUBCUTANEOUS at 12:15

## 2018-08-05 RX ADMIN — Medication 250 MG: at 08:00

## 2018-08-05 RX ADMIN — INSULIN LISPRO 16 UNITS: 100 INJECTION, SOLUTION INTRAVENOUS; SUBCUTANEOUS at 22:39

## 2018-08-05 RX ADMIN — GABAPENTIN 1200 MG: 400 CAPSULE ORAL at 08:00

## 2018-08-05 RX ADMIN — INSULIN LISPRO 15 UNITS: 100 INJECTION, SOLUTION INTRAVENOUS; SUBCUTANEOUS at 17:21

## 2018-08-05 RX ADMIN — LISINOPRIL 40 MG: 20 TABLET ORAL at 08:00

## 2018-08-05 RX ADMIN — INSULIN LISPRO 20 UNITS: 100 INJECTION, SOLUTION INTRAVENOUS; SUBCUTANEOUS at 17:22

## 2018-08-05 RX ADMIN — INSULIN DETEMIR 60 UNITS: 100 INJECTION, SOLUTION SUBCUTANEOUS at 17:23

## 2018-08-05 RX ADMIN — CLONIDINE HYDROCHLORIDE 0.1 MG: 0.1 TABLET ORAL at 22:39

## 2018-08-05 RX ADMIN — CLONIDINE HYDROCHLORIDE 0.1 MG: 0.1 TABLET ORAL at 05:33

## 2018-08-05 RX ADMIN — INSULIN DETEMIR 50 UNITS: 100 INJECTION, SOLUTION SUBCUTANEOUS at 08:01

## 2018-08-05 RX ADMIN — INSULIN LISPRO 12 UNITS: 100 INJECTION, SOLUTION INTRAVENOUS; SUBCUTANEOUS at 07:53

## 2018-08-05 RX ADMIN — ALPRAZOLAM 0.5 MG: 0.5 TABLET ORAL at 07:53

## 2018-08-06 LAB
GLUCOSE BLDC GLUCOMTR-MCNC: 219 MG/DL (ref 70–130)
GLUCOSE BLDC GLUCOMTR-MCNC: 222 MG/DL (ref 70–130)
GLUCOSE BLDC GLUCOMTR-MCNC: 350 MG/DL (ref 70–130)
GLUCOSE BLDC GLUCOMTR-MCNC: 449 MG/DL (ref 70–130)

## 2018-08-06 PROCEDURE — 63710000001 INSULIN DETEMIR PER 5 UNITS

## 2018-08-06 PROCEDURE — 97605 NEG PRS WND THER DME<=50SQCM: CPT

## 2018-08-06 PROCEDURE — 63710000001 INSULIN DETEMIR PER 5 UNITS: Performed by: INTERNAL MEDICINE

## 2018-08-06 PROCEDURE — 97164 PT RE-EVAL EST PLAN CARE: CPT

## 2018-08-06 PROCEDURE — 25010000002 ENOXAPARIN PER 10 MG: Performed by: INTERNAL MEDICINE

## 2018-08-06 PROCEDURE — 25010000002 LINEZOLID 600 MG/300ML SOLUTION: Performed by: INTERNAL MEDICINE

## 2018-08-06 PROCEDURE — 99233 SBSQ HOSP IP/OBS HIGH 50: CPT | Performed by: INTERNAL MEDICINE

## 2018-08-06 PROCEDURE — 63710000001 INSULIN LISPRO (HUMAN) PER 5 UNITS: Performed by: INTERNAL MEDICINE

## 2018-08-06 PROCEDURE — 82962 GLUCOSE BLOOD TEST: CPT

## 2018-08-06 PROCEDURE — 99231 SBSQ HOSP IP/OBS SF/LOW 25: CPT | Performed by: THORACIC SURGERY (CARDIOTHORACIC VASCULAR SURGERY)

## 2018-08-06 RX ADMIN — Medication 250 MG: at 20:10

## 2018-08-06 RX ADMIN — CLONIDINE HYDROCHLORIDE 0.1 MG: 0.1 TABLET ORAL at 05:55

## 2018-08-06 RX ADMIN — GABAPENTIN 1200 MG: 400 CAPSULE ORAL at 07:57

## 2018-08-06 RX ADMIN — INSULIN LISPRO 20 UNITS: 100 INJECTION, SOLUTION INTRAVENOUS; SUBCUTANEOUS at 17:26

## 2018-08-06 RX ADMIN — INSULIN LISPRO 20 UNITS: 100 INJECTION, SOLUTION INTRAVENOUS; SUBCUTANEOUS at 12:17

## 2018-08-06 RX ADMIN — INSULIN LISPRO 8 UNITS: 100 INJECTION, SOLUTION INTRAVENOUS; SUBCUTANEOUS at 20:11

## 2018-08-06 RX ADMIN — HYDROCODONE BITARTRATE AND ACETAMINOPHEN 1 TABLET: 10; 325 TABLET ORAL at 10:59

## 2018-08-06 RX ADMIN — GABAPENTIN 1200 MG: 400 CAPSULE ORAL at 17:25

## 2018-08-06 RX ADMIN — INSULIN LISPRO 15 UNITS: 100 INJECTION, SOLUTION INTRAVENOUS; SUBCUTANEOUS at 08:38

## 2018-08-06 RX ADMIN — INSULIN DETEMIR 60 UNITS: 100 INJECTION, SOLUTION SUBCUTANEOUS at 08:37

## 2018-08-06 RX ADMIN — Medication 250 MG: at 07:57

## 2018-08-06 RX ADMIN — CLONIDINE HYDROCHLORIDE 0.1 MG: 0.1 TABLET ORAL at 12:30

## 2018-08-06 RX ADMIN — INSULIN LISPRO 8 UNITS: 100 INJECTION, SOLUTION INTRAVENOUS; SUBCUTANEOUS at 17:25

## 2018-08-06 RX ADMIN — INSULIN DETEMIR 70 UNITS: 100 INJECTION, SOLUTION SUBCUTANEOUS at 21:00

## 2018-08-06 RX ADMIN — ENOXAPARIN SODIUM 40 MG: 40 INJECTION SUBCUTANEOUS at 20:09

## 2018-08-06 RX ADMIN — LINEZOLID 600 MG: 600 INJECTION, SOLUTION INTRAVENOUS at 17:26

## 2018-08-06 RX ADMIN — GABAPENTIN 1200 MG: 400 CAPSULE ORAL at 20:10

## 2018-08-06 RX ADMIN — LISINOPRIL 40 MG: 20 TABLET ORAL at 07:57

## 2018-08-06 RX ADMIN — ALPRAZOLAM 0.5 MG: 0.5 TABLET ORAL at 20:10

## 2018-08-06 RX ADMIN — LINEZOLID 600 MG: 600 INJECTION, SOLUTION INTRAVENOUS at 05:55

## 2018-08-06 RX ADMIN — CLONIDINE HYDROCHLORIDE 0.1 MG: 0.1 TABLET ORAL at 20:11

## 2018-08-06 RX ADMIN — INSULIN DETEMIR 10 UNITS: 100 INJECTION, SOLUTION SUBCUTANEOUS at 13:49

## 2018-08-06 RX ADMIN — INSULIN LISPRO 24 UNITS: 100 INJECTION, SOLUTION INTRAVENOUS; SUBCUTANEOUS at 07:56

## 2018-08-07 LAB
GLUCOSE BLDC GLUCOMTR-MCNC: 192 MG/DL (ref 70–130)
GLUCOSE BLDC GLUCOMTR-MCNC: 237 MG/DL (ref 70–130)
GLUCOSE BLDC GLUCOMTR-MCNC: 346 MG/DL (ref 70–130)
GLUCOSE BLDC GLUCOMTR-MCNC: 399 MG/DL (ref 70–130)

## 2018-08-07 PROCEDURE — 97605 NEG PRS WND THER DME<=50SQCM: CPT

## 2018-08-07 PROCEDURE — 97116 GAIT TRAINING THERAPY: CPT

## 2018-08-07 PROCEDURE — 99231 SBSQ HOSP IP/OBS SF/LOW 25: CPT | Performed by: THORACIC SURGERY (CARDIOTHORACIC VASCULAR SURGERY)

## 2018-08-07 PROCEDURE — 63710000001 INSULIN DETEMIR PER 5 UNITS: Performed by: INTERNAL MEDICINE

## 2018-08-07 PROCEDURE — 97110 THERAPEUTIC EXERCISES: CPT

## 2018-08-07 PROCEDURE — 99233 SBSQ HOSP IP/OBS HIGH 50: CPT | Performed by: INTERNAL MEDICINE

## 2018-08-07 PROCEDURE — 25010000002 ENOXAPARIN PER 10 MG: Performed by: INTERNAL MEDICINE

## 2018-08-07 PROCEDURE — 25010000002 LINEZOLID 600 MG/300ML SOLUTION: Performed by: INTERNAL MEDICINE

## 2018-08-07 PROCEDURE — 63710000001 INSULIN LISPRO (HUMAN) PER 5 UNITS: Performed by: INTERNAL MEDICINE

## 2018-08-07 PROCEDURE — 82962 GLUCOSE BLOOD TEST: CPT

## 2018-08-07 RX ADMIN — INSULIN LISPRO 30 UNITS: 100 INJECTION, SOLUTION INTRAVENOUS; SUBCUTANEOUS at 17:32

## 2018-08-07 RX ADMIN — CLONIDINE HYDROCHLORIDE 0.1 MG: 0.1 TABLET ORAL at 21:32

## 2018-08-07 RX ADMIN — LISINOPRIL 40 MG: 20 TABLET ORAL at 08:30

## 2018-08-07 RX ADMIN — LINEZOLID 600 MG: 600 INJECTION, SOLUTION INTRAVENOUS at 05:54

## 2018-08-07 RX ADMIN — ENOXAPARIN SODIUM 40 MG: 40 INJECTION SUBCUTANEOUS at 21:32

## 2018-08-07 RX ADMIN — CLONIDINE HYDROCHLORIDE 0.1 MG: 0.1 TABLET ORAL at 14:50

## 2018-08-07 RX ADMIN — GABAPENTIN 1200 MG: 400 CAPSULE ORAL at 17:32

## 2018-08-07 RX ADMIN — INSULIN DETEMIR 70 UNITS: 100 INJECTION, SOLUTION SUBCUTANEOUS at 21:31

## 2018-08-07 RX ADMIN — Medication 250 MG: at 08:29

## 2018-08-07 RX ADMIN — GABAPENTIN 1200 MG: 400 CAPSULE ORAL at 21:32

## 2018-08-07 RX ADMIN — INSULIN LISPRO 20 UNITS: 100 INJECTION, SOLUTION INTRAVENOUS; SUBCUTANEOUS at 11:56

## 2018-08-07 RX ADMIN — INSULIN LISPRO 16 UNITS: 100 INJECTION, SOLUTION INTRAVENOUS; SUBCUTANEOUS at 08:30

## 2018-08-07 RX ADMIN — INSULIN LISPRO 20 UNITS: 100 INJECTION, SOLUTION INTRAVENOUS; SUBCUTANEOUS at 08:30

## 2018-08-07 RX ADMIN — Medication 250 MG: at 21:32

## 2018-08-07 RX ADMIN — INSULIN DETEMIR 70 UNITS: 100 INJECTION, SOLUTION SUBCUTANEOUS at 08:29

## 2018-08-07 RX ADMIN — GABAPENTIN 1200 MG: 400 CAPSULE ORAL at 09:41

## 2018-08-07 RX ADMIN — INSULIN LISPRO 30 UNITS: 100 INJECTION, SOLUTION INTRAVENOUS; SUBCUTANEOUS at 11:57

## 2018-08-07 RX ADMIN — CLONIDINE HYDROCHLORIDE 0.1 MG: 0.1 TABLET ORAL at 05:53

## 2018-08-07 RX ADMIN — INSULIN LISPRO 4 UNITS: 100 INJECTION, SOLUTION INTRAVENOUS; SUBCUTANEOUS at 17:33

## 2018-08-07 RX ADMIN — LINEZOLID 600 MG: 600 INJECTION, SOLUTION INTRAVENOUS at 17:32

## 2018-08-07 RX ADMIN — INSULIN LISPRO 8 UNITS: 100 INJECTION, SOLUTION INTRAVENOUS; SUBCUTANEOUS at 21:33

## 2018-08-08 VITALS
WEIGHT: 245 LBS | HEART RATE: 89 BPM | DIASTOLIC BLOOD PRESSURE: 94 MMHG | HEIGHT: 75 IN | BODY MASS INDEX: 30.46 KG/M2 | TEMPERATURE: 98.5 F | OXYGEN SATURATION: 100 % | RESPIRATION RATE: 20 BRPM | SYSTOLIC BLOOD PRESSURE: 157 MMHG

## 2018-08-08 LAB
DEPRECATED RDW RBC AUTO: 44.2 FL (ref 37–54)
ERYTHROCYTE [DISTWIDTH] IN BLOOD BY AUTOMATED COUNT: 14.3 % (ref 11.3–14.5)
GLUCOSE BLDC GLUCOMTR-MCNC: 182 MG/DL (ref 70–130)
GLUCOSE BLDC GLUCOMTR-MCNC: 277 MG/DL (ref 70–130)
GLUCOSE BLDC GLUCOMTR-MCNC: 377 MG/DL (ref 70–130)
HCT VFR BLD AUTO: 38 % (ref 38.9–50.9)
HGB BLD-MCNC: 12.3 G/DL (ref 13.1–17.5)
MCH RBC QN AUTO: 27.9 PG (ref 27–31)
MCHC RBC AUTO-ENTMCNC: 32.4 G/DL (ref 32–36)
MCV RBC AUTO: 86.2 FL (ref 80–99)
PLATELET # BLD AUTO: 207 10*3/MM3 (ref 150–450)
PMV BLD AUTO: 11.7 FL (ref 6–12)
RBC # BLD AUTO: 4.41 10*6/MM3 (ref 4.2–5.76)
WBC NRBC COR # BLD: 8.13 10*3/MM3 (ref 3.5–10.8)

## 2018-08-08 PROCEDURE — 85027 COMPLETE CBC AUTOMATED: CPT

## 2018-08-08 PROCEDURE — 25010000002 LINEZOLID 600 MG/300ML SOLUTION: Performed by: INTERNAL MEDICINE

## 2018-08-08 PROCEDURE — 97605 NEG PRS WND THER DME<=50SQCM: CPT

## 2018-08-08 PROCEDURE — 82962 GLUCOSE BLOOD TEST: CPT

## 2018-08-08 PROCEDURE — 99239 HOSP IP/OBS DSCHRG MGMT >30: CPT | Performed by: INTERNAL MEDICINE

## 2018-08-08 PROCEDURE — 97597 DBRDMT OPN WND 1ST 20 CM/<: CPT

## 2018-08-08 PROCEDURE — 63710000001 INSULIN DETEMIR PER 5 UNITS: Performed by: INTERNAL MEDICINE

## 2018-08-08 RX ORDER — LACTOBACILLUS RHAMNOSUS GG 10B CELL
1 CAPSULE ORAL DAILY
Qty: 30 CAPSULE | Refills: 0 | Status: SHIPPED | OUTPATIENT
Start: 2018-08-08 | End: 2019-01-04 | Stop reason: SDUPTHER

## 2018-08-08 RX ORDER — HYDROCODONE BITARTRATE AND ACETAMINOPHEN 10; 325 MG/1; MG/1
1 TABLET ORAL EVERY 4 HOURS PRN
Status: DISCONTINUED | OUTPATIENT
Start: 2018-08-08 | End: 2018-08-08 | Stop reason: HOSPADM

## 2018-08-08 RX ORDER — LISINOPRIL 40 MG/1
40 TABLET ORAL DAILY
Qty: 30 TABLET | Refills: 0 | Status: SHIPPED | OUTPATIENT
Start: 2018-08-09 | End: 2019-01-04 | Stop reason: SDUPTHER

## 2018-08-08 RX ORDER — LINEZOLID 600 MG/1
600 TABLET, FILM COATED ORAL EVERY 12 HOURS SCHEDULED
Start: 2018-08-08 | End: 2018-08-08

## 2018-08-08 RX ORDER — LINEZOLID 600 MG/1
600 TABLET, FILM COATED ORAL EVERY 12 HOURS SCHEDULED
Qty: 14 TABLET | Refills: 0 | Status: SHIPPED | OUTPATIENT
Start: 2018-08-08 | End: 2019-01-04

## 2018-08-08 RX ADMIN — HYDROCODONE BITARTRATE AND ACETAMINOPHEN 1 TABLET: 10; 325 TABLET ORAL at 13:41

## 2018-08-08 RX ADMIN — Medication 250 MG: at 08:26

## 2018-08-08 RX ADMIN — INSULIN LISPRO 4 UNITS: 100 INJECTION, SOLUTION INTRAVENOUS; SUBCUTANEOUS at 17:04

## 2018-08-08 RX ADMIN — INSULIN LISPRO 30 UNITS: 100 INJECTION, SOLUTION INTRAVENOUS; SUBCUTANEOUS at 08:27

## 2018-08-08 RX ADMIN — INSULIN DETEMIR 70 UNITS: 100 INJECTION, SOLUTION SUBCUTANEOUS at 08:27

## 2018-08-08 RX ADMIN — ALPRAZOLAM 0.5 MG: 0.5 TABLET ORAL at 15:03

## 2018-08-08 RX ADMIN — INSULIN LISPRO 30 UNITS: 100 INJECTION, SOLUTION INTRAVENOUS; SUBCUTANEOUS at 12:13

## 2018-08-08 RX ADMIN — LINEZOLID 600 MG: 600 INJECTION, SOLUTION INTRAVENOUS at 05:05

## 2018-08-08 RX ADMIN — INSULIN LISPRO 12 UNITS: 100 INJECTION, SOLUTION INTRAVENOUS; SUBCUTANEOUS at 12:14

## 2018-08-08 RX ADMIN — CLONIDINE HYDROCHLORIDE 0.1 MG: 0.1 TABLET ORAL at 05:05

## 2018-08-08 RX ADMIN — INSULIN LISPRO 20 UNITS: 100 INJECTION, SOLUTION INTRAVENOUS; SUBCUTANEOUS at 08:31

## 2018-08-08 RX ADMIN — INSULIN LISPRO 30 UNITS: 100 INJECTION, SOLUTION INTRAVENOUS; SUBCUTANEOUS at 17:04

## 2018-08-08 RX ADMIN — LISINOPRIL 40 MG: 20 TABLET ORAL at 08:26

## 2018-08-08 RX ADMIN — CLONIDINE HYDROCHLORIDE 0.1 MG: 0.1 TABLET ORAL at 13:41

## 2018-08-08 RX ADMIN — GABAPENTIN 1200 MG: 400 CAPSULE ORAL at 17:07

## 2018-08-08 RX ADMIN — GABAPENTIN 1200 MG: 400 CAPSULE ORAL at 08:26

## 2018-08-09 ENCOUNTER — READMISSION MANAGEMENT (OUTPATIENT)
Dept: CALL CENTER | Facility: HOSPITAL | Age: 48
End: 2018-08-09

## 2018-08-09 NOTE — OUTREACH NOTE
Prep Survey      Responses   Facility patient discharged from?  [facility]   Is patient eligible?  Yes   Discharge diagnosis  Diabetic ulcer of lower leg   Does the patient have one of the following disease processes/diagnoses(primary or secondary)?  Other   Does the patient have Home health ordered?  Yes   What is the [facility] health agency?   [facility]   Is there a DME ordered?  No   Prep survey completed?  Yes          Felipa Harley RN

## 2018-08-10 ENCOUNTER — TELEPHONE (OUTPATIENT)
Dept: CARDIAC SURGERY | Facility: CLINIC | Age: 48
End: 2018-08-10

## 2018-08-10 ENCOUNTER — READMISSION MANAGEMENT (OUTPATIENT)
Dept: CALL CENTER | Facility: HOSPITAL | Age: 48
End: 2018-08-10

## 2018-08-10 NOTE — OUTREACH NOTE
Medical Week 1 Survey      Responses   Facility patient discharged from?  [facility]   Does the patient have one of the following disease processes/diagnoses(primary or secondary)?  Other   Is there a successful TCM telephone encounter documented?  No   Week 1 attempt successful?  Yes   Call start time  1144   Call end time  1150   Discharge diagnosis  Diabetic ulcer of lower leg   Meds reviewed with patient/caregiver?  Yes   Is the patient having any side effects they believe may be caused by any medication additions or changes?  No   Does the patient have all medications ordered at discharge?  Yes   Is the patient taking all medications as directed (includes completed medication regime)?  Yes   Comments regarding appointments  Reviewed appts with patient   Does the patient have a primary care provider?   Yes   Does the patient have an appointment with their PCP within 7 days of discharge?  No   Comments regarding PCP  Dr Barfield-PCP-Patient has not had time yet to make followup- Encouraged patient to do so.    Nursing Interventions  Educated patient on importance of making appointment, Advised patient to make appointment   Has the patient kept scheduled appointments due by today?  Yes   What [facility] the [facility] agency[facility]   Psychosocial issues?  No   Did the patient receive a copy of their discharge instructions?  Yes   Nursing interventions  Reviewed instructions with patient, Referred to HIM, Educated on MyChart   What is the patient's perception of their health status since discharge?  Improving   Is the patient/caregiver able to teach back signs and symptoms related to disease process for when to call PCP?  Yes   Is the patient/caregiver able to teach back signs and symptoms related to disease process for when to call 911?  Yes   Is the patient/caregiver able to teach back the hierarchy of who to call/visit for symptoms/problems? PCP, Specialist, Home health nurse, Urgent Care,  ED, 911  Yes   Week 1 call completed?  Yes          Wellington Jung RN

## 2018-08-10 NOTE — TELEPHONE ENCOUNTER
Marlinrichard Mccarthy, who is Mr. Crystal's  called to make you aware that he is not taking any of his medications at this time, says he is having some issues with depression.     8/13/2018  I spoke with Manju at Dr. Mumtaz Barfield's [facility]'s instructions to inform that he is not taking any of his medications and is having some depression issues.

## 2018-08-13 NOTE — TELEPHONE ENCOUNTER
Can you contact his primary care physician's office to arrange help with his depression and improving compliance?  Thanks

## 2018-08-14 ENCOUNTER — TELEPHONE (OUTPATIENT)
Dept: CARDIAC SURGERY | Facility: CLINIC | Age: 48
End: 2018-08-14

## 2018-08-14 NOTE — TELEPHONE ENCOUNTER
----- Message from Juancho Martinez MD sent at 8/13/2018  4:33 PM EDT -----  2 weeks from discharge.  Thanks    ----- Message -----  From: Santo Dial  Sent: 8/13/2018   2:35 PM  To: MD Dr. Michelle Augustine, you seen this pt while he was admitted to the hospital. His wound VAC was changes on 08/08. He is recall for his yearly f/u in January of 2019. Does he need to return to the clinic before then? Thanks

## 2018-08-17 ENCOUNTER — TRANSCRIBE ORDERS (OUTPATIENT)
Dept: LAB | Facility: HOSPITAL | Age: 48
End: 2018-08-17

## 2018-08-17 ENCOUNTER — LAB (OUTPATIENT)
Dept: LAB | Facility: HOSPITAL | Age: 48
End: 2018-08-17

## 2018-08-17 DIAGNOSIS — B95.62 CELLULITIS DUE TO MRSA: ICD-10-CM

## 2018-08-17 DIAGNOSIS — E11.51 TYPE II DIABETES MELLITUS WITH PERIPHERAL CIRCULATORY DISORDER (HCC): ICD-10-CM

## 2018-08-17 DIAGNOSIS — B95.62 CELLULITIS DUE TO MRSA: Primary | ICD-10-CM

## 2018-08-17 DIAGNOSIS — L03.90 CELLULITIS DUE TO MRSA: Primary | ICD-10-CM

## 2018-08-17 DIAGNOSIS — L03.90 CELLULITIS DUE TO MRSA: ICD-10-CM

## 2018-08-17 LAB
ALBUMIN SERPL-MCNC: 4.08 G/DL (ref 3.2–4.8)
ALBUMIN/GLOB SERPL: 1.5 G/DL (ref 1.5–2.5)
ALP SERPL-CCNC: 141 U/L (ref 25–100)
ALT SERPL W P-5'-P-CCNC: 31 U/L (ref 7–40)
ANION GAP SERPL CALCULATED.3IONS-SCNC: 6 MMOL/L (ref 3–11)
AST SERPL-CCNC: 20 U/L (ref 0–33)
BASOPHILS # BLD AUTO: 0.04 10*3/MM3 (ref 0–0.2)
BASOPHILS NFR BLD AUTO: 0.5 % (ref 0–1)
BILIRUB SERPL-MCNC: 0.5 MG/DL (ref 0.3–1.2)
BUN BLD-MCNC: 18 MG/DL (ref 9–23)
BUN/CREAT SERPL: 23.1 (ref 7–25)
CALCIUM SPEC-SCNC: 8.9 MG/DL (ref 8.7–10.4)
CHLORIDE SERPL-SCNC: 99 MMOL/L (ref 99–109)
CO2 SERPL-SCNC: 30 MMOL/L (ref 20–31)
CREAT BLD-MCNC: 0.78 MG/DL (ref 0.6–1.3)
DEPRECATED RDW RBC AUTO: 43.9 FL (ref 37–54)
EOSINOPHIL # BLD AUTO: 0.18 10*3/MM3 (ref 0–0.3)
EOSINOPHIL NFR BLD AUTO: 2.2 % (ref 0–3)
ERYTHROCYTE [DISTWIDTH] IN BLOOD BY AUTOMATED COUNT: 14.4 % (ref 11.3–14.5)
GFR SERPL CREATININE-BSD FRML MDRD: 106 ML/MIN/1.73
GLOBULIN UR ELPH-MCNC: 2.7 GM/DL
GLUCOSE BLD-MCNC: 386 MG/DL (ref 70–100)
HCT VFR BLD AUTO: 39.2 % (ref 38.9–50.9)
HGB BLD-MCNC: 13 G/DL (ref 13.1–17.5)
IMM GRANULOCYTES # BLD: 0.01 10*3/MM3 (ref 0–0.03)
IMM GRANULOCYTES NFR BLD: 0.1 % (ref 0–0.6)
LYMPHOCYTES # BLD AUTO: 1.97 10*3/MM3 (ref 0.6–4.8)
LYMPHOCYTES NFR BLD AUTO: 24.1 % (ref 24–44)
MCH RBC QN AUTO: 28 PG (ref 27–31)
MCHC RBC AUTO-ENTMCNC: 33.2 G/DL (ref 32–36)
MCV RBC AUTO: 84.5 FL (ref 80–99)
MONOCYTES # BLD AUTO: 0.6 10*3/MM3 (ref 0–1)
MONOCYTES NFR BLD AUTO: 7.4 % (ref 0–12)
NEUTROPHILS # BLD AUTO: 5.37 10*3/MM3 (ref 1.5–8.3)
NEUTROPHILS NFR BLD AUTO: 65.8 % (ref 41–71)
PLATELET # BLD AUTO: 207 10*3/MM3 (ref 150–450)
PMV BLD AUTO: 11.8 FL (ref 6–12)
POTASSIUM BLD-SCNC: 4.1 MMOL/L (ref 3.5–5.5)
PROT SERPL-MCNC: 6.8 G/DL (ref 5.7–8.2)
RBC # BLD AUTO: 4.64 10*6/MM3 (ref 4.2–5.76)
SODIUM BLD-SCNC: 135 MMOL/L (ref 132–146)
WBC NRBC COR # BLD: 8.16 10*3/MM3 (ref 3.5–10.8)

## 2018-08-17 PROCEDURE — 80053 COMPREHEN METABOLIC PANEL: CPT

## 2018-08-17 PROCEDURE — 85025 COMPLETE CBC W/AUTO DIFF WBC: CPT

## 2018-08-17 PROCEDURE — 36415 COLL VENOUS BLD VENIPUNCTURE: CPT

## 2018-08-21 ENCOUNTER — OFFICE VISIT (OUTPATIENT)
Dept: CARDIAC SURGERY | Facility: CLINIC | Age: 48
End: 2018-08-21

## 2018-08-21 ENCOUNTER — READMISSION MANAGEMENT (OUTPATIENT)
Dept: CALL CENTER | Facility: HOSPITAL | Age: 48
End: 2018-08-21

## 2018-08-21 VITALS
SYSTOLIC BLOOD PRESSURE: 160 MMHG | HEIGHT: 75 IN | TEMPERATURE: 98.2 F | OXYGEN SATURATION: 98 % | BODY MASS INDEX: 30.46 KG/M2 | DIASTOLIC BLOOD PRESSURE: 88 MMHG | WEIGHT: 245 LBS | HEART RATE: 95 BPM

## 2018-08-21 DIAGNOSIS — Z89.512 S/P BKA (BELOW KNEE AMPUTATION), LEFT (HCC): Primary | ICD-10-CM

## 2018-08-21 PROCEDURE — 99213 OFFICE O/P EST LOW 20 MIN: CPT | Performed by: PHYSICIAN ASSISTANT

## 2018-08-21 NOTE — PROGRESS NOTES
08/21/2018  Patient Information  Kendrick Crystal                                                                                          [address] [place] [address] [address] [place] [address]   1970  'PCP/Referring Physician'  Mumtaz Barfield  [phone]  No ref. provider found    Chief Complaint   Patient presents with   • Follow-up     2 week follow up        History of Present Illness:  Patient is a 47-year-old  male with history of hypertension, hyperlipidemia, type 2 diabetes mellitus, left below the knee amputation (3/2/17) and cellulitis/diabetic ulcer of the right anterior lower extremity who presents to office today for hospital follow-up after recent 8/8/18 discharge.  The patient was admitted to [facility] on 7/28/18 through 8/8/18 for nonhealing ulcer of right lower extremity, which grew MRSA.  The patient denies any right lower extremity pain, or left lower extremity phantom pain.  He further denies any nausea, vomiting, fever, chills, or night sweats.  The patient is followed by Dr. Dong with Infectious Disease and is currently taking Zyvox PO, as directed.  The patient continues to take daily Lortab for daily pain management.  He is seen 2 times per week by home health and is currently tolerating his wound VAC.  The patient is otherwise doing well.      Patient Active Problem List   Diagnosis   • DUNLAP Cirrhosis   • Essential hypertension   • Non-compliance   • Hyperlipemia   • Wound infection of left leg   • Type 2 diabetes mellitus with circulatory disorder and uncontrolled   • Dysthymia   • History of MRSA infection   • Diabetic ulcer of right lower leg (CMS/HCC)   • Cellulitis of right anterior lower leg   • JUAN (acute kidney injury) (CMS/HCC)     Past Medical History:   Diagnosis Date   • JUAN (acute kidney injury) (CMS/HCC) 7/29/2018   • Arthritis    • Cellulitis of right anterior lower leg 7/29/2018   • Cirrhosis (CMS/HCC)    • Counseling for insulin pump    • Depression    •  Diabetes mellitus (CMS/HCC)    • Encephalopathy, hepatic (CMS/HCC)    • H/O degenerative disc disease    • History of Lissa's gangrene    • Hyperlipemia    • Hypertension    • multiple Skin abscesses    • DUNLAP, bx showed stage IV fibrosis    • Neuropathy    • Peripheral vascular disease (CMS/HCC)      Past Surgical History:   Procedure Laterality Date   • AMPUTATION DIGIT Left 1/30/2017    Procedure: left fourth and fifth transmetatarsal toe amputation ;  Surgeon: Juancho Martinez MD;  Location:  [facility];  Service:    • BELOW KNEE AMPUTATION Left 3/2/2017    Procedure: AMPUTATION BELOW KNEE, SHMUEL;  Surgeon: Juancho Martinez MD;  Location:  [facility];  Service:    • LEG SURGERY     • TESTICLE SURGERY      DEBRIDEMENT FROM GANGRENE       Current Outpatient Prescriptions:   •  acetaminophen (TYLENOL) 325 MG tablet, Take 2 tablets by mouth Every 4 (Four) Hours As Needed for mild pain (1-3)., Disp: , Rfl: 0  •  gabapentin (NEURONTIN) 400 MG capsule, Take 3 capsules by mouth 3 (Three) Times a Day., Disp: , Rfl:   •  HYDROcodone-acetaminophen (NORCO)  MG per tablet, Take 1 tablet by mouth 3 (Three) Times a Day As Needed for Moderate Pain  or Severe Pain ., Disp: , Rfl:   •  insulin detemir (LEVEMIR) 100 UNIT/ML injection, Inject 70 Units under the skin into the appropriate area as directed 2 (Two) Times a Day., Disp: 10 mL, Rfl: 12  •  Insulin Lispro (HUMALOG) 100 UNIT/ML solution pen-injector, Inject 30 Units under the skin into the appropriate area as directed 3 (Three) Times a Day With Meals., Disp: 3 mL, Rfl: 0  •  linezolid (ZYVOX) 600 MG tablet, Take 1 tablet by mouth Every 12 (Twelve) Hours., Disp: 14 tablet, Rfl: 0  •  lisinopril (PRINIVIL,ZESTRIL) 40 MG tablet, Take 1 tablet by mouth Daily., Disp: 30 tablet, Rfl: 0  •  metFORMIN (GLUCOPHAGE) 1000 MG tablet, Take 1 tablet by mouth Daily With Breakfast., Disp: 30 tablet, Rfl: 0  •  probiotic (CULTURELLE) capsule capsule, Take 1 capsule by mouth Daily., Disp: 30  capsule, Rfl: 0  Allergies   Allergen Reactions   • No Known Drug Allergy      Social History     Social History   • Marital status:      Spouse name: N/A   • Number of children: 1   • Years of education: N/A     Occupational History   • Security      Disabled-Diabetic Retinopathy     Social History Main Topics   • Smoking status: Never Smoker   • Smokeless tobacco: Never Used   • Alcohol use No   • Drug use: No   • Sexual activity: Defer     Other Topics Concern   • Not on file     Social History Narrative   • No narrative on file     Family History   Problem Relation Age of Onset   • Arthritis Mother    • Hypertension Mother    • Kidney disease Mother    • Stroke Mother    • Heart attack Father    • Arthritis Father    • Diabetes Father    • Hyperlipidemia Father    • Hypertension Father    • Mental illness Sister    • Diabetes Brother    • Hypertension Brother    • Obesity Brother      Review of Systems   Constitution: Negative for chills, fever, malaise/fatigue, night sweats and weight loss.   HENT: Negative for congestion, hearing loss, odynophagia and sore throat.    Cardiovascular: Negative for chest pain, dyspnea on exertion, leg swelling, orthopnea and palpitations.   Respiratory: Positive for cough. Negative for hemoptysis and shortness of breath.    Endocrine: Negative for cold intolerance, heat intolerance, polydipsia, polyphagia and polyuria.   Hematologic/Lymphatic: Does not bruise/bleed easily.   Skin: Negative for itching and rash.   Musculoskeletal: Positive for muscle cramps (muscle spasms ). Negative for back pain, joint pain, joint swelling, muscle weakness, myalgias and neck pain.   Gastrointestinal: Negative for abdominal pain, constipation, diarrhea, hematemesis, hematochezia, melena, nausea and vomiting.   Genitourinary: Negative for dysuria, frequency and hematuria.   Neurological: Negative for dizziness, focal weakness, headaches, light-headedness, loss of balance, numbness,  "paresthesias and seizures.   Psychiatric/Behavioral: Negative for depression and suicidal ideas. The patient is not nervous/anxious.    All other systems reviewed and are negative.    Vitals:    08/21/18 1226   BP: 160/88   BP Location: Right arm   Patient Position: Sitting   Pulse: 95   Temp: 98.2 °F (36.8 °C)   SpO2: 98%   Weight: 111 kg (245 lb)   Height: 190.5 cm (75\")      Physical Exam:  Gen - NAD, pleasant, cooperative  CV - Regular rate and rhythm, no murmur gallop or rub  Pulm - Lungs clear to auscultation without wheeze or rhonchi   GI - Soft, normoactive bowel sounds, non-tender  Ext - 6 cm x 4 cm ulceration present on anterior right shin with good granulation tissue  and scant necrotic tissue present   Incision - Left BKA well healed, no evidence of incisional dehiscence or cellulitis    Assessment/Plan:  Patient is a 47-year-old  male with history of hypertension, hyperlipidemia, type 2 diabetes mellitus, left below the knee amputation (3/2/17) and cellulitis/diabetic ulcer of the right anterior lower extremity who presents to office today for hospital follow-up after recent 8/8/18 discharge.  The patient has been tolerating his wound VAC well, and is receiving PO antibiotics from his infectious disease physician, Dr. Dong.  Per home health's measurement of his right anterior shin wound, it is decreasing in size.  I did remove some skin necrotic tissue from the wound base with a #15 scalpel.  The patient will need to continue to carefully manage his blood sugar.  I would like for the patient to follow-up in 4 weeks for evaluation of his right anterior shin ulceration.  If the patient has any acutely worsening symptoms, he may call our office prior to this follow-up visit, or present to the nearest emergency department.  If patient has any questions or concerns he may call our office at any time during the interval.      Patient Active Problem List   Diagnosis   • DUNLAP Cirrhosis   • Essential " hypertension   • Non-compliance   • Hyperlipemia   • Wound infection of left leg   • Type 2 diabetes mellitus with circulatory disorder and uncontrolled   • Dysthymia   • History of MRSA infection   • Diabetic ulcer of right lower leg (CMS/[facility])   • Cellulitis of right anterior lower leg   • JUAN (acute kidney injury) (CMS/[facility])     Signed by:

## 2018-08-21 NOTE — OUTREACH NOTE
Medical Week 2 Survey      Responses   Facility patient discharged from?  [facility]   Does the patient have one of the following disease processes/diagnoses(primary or secondary)?  Other   Week 2 attempt successful?  Yes   Call start time  1506   Discharge diagnosis  Diabetic ulcer of lower leg   Call end time  1510   Meds reviewed with patient/caregiver?  Yes   Is the patient having any side effects they believe may be caused by any medication additions or changes?  No   Prescription comments  Pt states he is not taking IV antibx   Is the patient taking all medications as directed (includes completed medication regime)?  Yes   Comments regarding appointments  Pt has seen Dr Martinez today and will return in 4 weeks   Does the patient have a primary care provider?   Yes   Has the patient kept scheduled appointments due by today?  Yes   What is the [facility] health agency?   [facility]   Home health comments   is coming in to assist pt with his wound vac. He will see Dr Martinez in 4 weeks to see if wound vac can be taken off at that time or status of wound vac.   Psychosocial issues?  No   What is the patient's perception of their health status since discharge?  Improving   Additional teach back comments  Pt states he feels better--DR Martinez excised some skin today and he has the wound vac in place.   Week 2 Call Completed?  Yes          Rox Valdez, RN

## 2018-08-22 ENCOUNTER — OUTSIDE FACILITY SERVICE (OUTPATIENT)
Dept: HOSPITALIST | Facility: HOSPITAL | Age: 48
End: 2018-08-22

## 2018-08-22 PROCEDURE — G0180 MD CERTIFICATION HHA PATIENT: HCPCS | Performed by: INTERNAL MEDICINE

## 2018-08-23 PROBLEM — Z89.512 S/P BKA (BELOW KNEE AMPUTATION), LEFT (HCC): Status: ACTIVE | Noted: 2018-08-23

## 2018-08-30 ENCOUNTER — READMISSION MANAGEMENT (OUTPATIENT)
Dept: CALL CENTER | Facility: HOSPITAL | Age: 48
End: 2018-08-30

## 2018-08-30 NOTE — OUTREACH NOTE
Medical Week 3 Survey      Responses   Facility patient discharged from?  [facility]   Does the patient have one of the following disease processes/diagnoses(primary or secondary)?  Other   Week 3 attempt successful?  Yes   Call start time  1547   Call end time  1553   Discharge diagnosis  Diabetic ulcer of lower leg   Meds reviewed with patient/caregiver?  Yes   Is the patient having any side effects they believe may be caused by any medication additions or changes?  No   Does the patient have all medications ordered at discharge?  Yes   Is the patient taking all medications as directed (includes completed medication regime)?  Yes   Does the patient have a primary care provider?   Yes   Comments regarding PCP  pt seeing inf dx Dr Dong, and Dr Martinez for now, will see PCP later   Does the patient have an appointment with their PCP within 7 days of discharge?  N/A   Has the patient kept scheduled appointments due by today?  Yes   What is the [facility] health agency?   [facility]   Home health comments   is coming in to assist pt with his wound vac. He will see Dr Martinez in 4 weeks to see if wound vac can be taken off at that time or status of wound vac.   Comments  Pt states  nurse and MD's feel wound is healing well and closing over, stated would have wound vac removed in 1 month if wound is helaed and small by then   Did the patient receive a copy of their discharge instructions?  Yes   Nursing interventions  Reviewed instructions with patient   What is the patient's perception of their health status since discharge?  Improving   Is the patient/caregiver able to teach back signs and symptoms related to disease process for when to call PCP?  Yes   Is the patient/caregiver able to teach back signs and symptoms related to disease process for when to call 911?  Yes   Is the patient/caregiver able to teach back the hierarchy of who to call/visit for symptoms/problems? PCP, Specialist, Home health nurse,  Urgent Care, ED, 911  Yes   Additional teach back comments  pt states good knowledge about wound healing and watching for signs of infection, states hhhhhealing progressing well, and pain is decreased   Week 3 Call Completed?  Yes          Meri Villa RN

## 2018-09-06 ENCOUNTER — READMISSION MANAGEMENT (OUTPATIENT)
Dept: CALL CENTER | Facility: HOSPITAL | Age: 48
End: 2018-09-06

## 2018-09-06 NOTE — OUTREACH NOTE
Medical Week 4 Survey      Responses   Facility patient discharged from?  [place]   Does the patient have one of the following disease processes/diagnoses(primary or secondary)?  Other   Week 4 attempt successful?  Yes   Call start time  1809   Call end time  1813   Discharge diagnosis  Diabetic ulcer of lower leg   Meds reviewed with patient/caregiver?  Yes   Is the patient having any side effects they believe may be caused by any medication additions or changes?  No   Is the patient taking all medications as directed (includes completed medication regime)?  Yes   Medication comments  Finished PO ABX   Has the patient kept scheduled appointments due by today?  Yes   Is the patient still receiving Home Health Services?  Yes   Home health comments  HH is still coming and the wound Vac is still in place,  Wife says the wound is shrinking and the vac will most likely come off in 2 weeks.   Psychosocial issues?  No   Comments  Pt states  nurse and MD's feel wound is healing well and closing over, stated would have wound vac removed in 1 month if wound is helaed and small by then   What is the patient's perception of their health status since discharge?  Improving   Is the patient/caregiver able to teach back signs and symptoms related to disease process for when to call PCP?  Yes   Is the patient/caregiver able to teach back signs and symptoms related to disease process for when to call 911?  Yes   Is the patient/caregiver able to teach back the hierarchy of who to call/visit for symptoms/problems? PCP, Specialist, Home health nurse, Urgent Care, ED, 911  Yes   Week 4 Call Completed?  Yes          Mami Moscoso RN

## 2018-09-18 ENCOUNTER — TELEPHONE (OUTPATIENT)
Dept: CARDIAC SURGERY | Facility: CLINIC | Age: 48
End: 2018-09-18
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
## 2018-10-16 ENCOUNTER — OFFICE VISIT (OUTPATIENT)
Dept: CARDIAC SURGERY | Facility: CLINIC | Age: 48
End: 2018-10-16

## 2018-10-16 VITALS
DIASTOLIC BLOOD PRESSURE: 80 MMHG | HEIGHT: 75 IN | SYSTOLIC BLOOD PRESSURE: 138 MMHG | OXYGEN SATURATION: 96 % | WEIGHT: 244 LBS | TEMPERATURE: 98.1 F | HEART RATE: 99 BPM | BODY MASS INDEX: 30.34 KG/M2

## 2018-10-16 DIAGNOSIS — L97.919 DIABETIC ULCER OF RIGHT LOWER LEG (HCC): Primary | ICD-10-CM

## 2018-10-16 DIAGNOSIS — E11.622 DIABETIC ULCER OF RIGHT LOWER LEG (HCC): Primary | ICD-10-CM

## 2018-10-16 PROCEDURE — 99213 OFFICE O/P EST LOW 20 MIN: CPT | Performed by: PHYSICIAN ASSISTANT

## 2018-10-16 NOTE — PROGRESS NOTES
10/16/2018  Patient Information  Kendrick Crystal                                                                                          [address] [place] [address] [address] [place] [address]   1970  'PCP/Referring Physician'  Mumtaz Barfield  [phone]  No ref. provider found    Chief Complaint   Patient presents with   • Follow-up     Follow up per Andrei for wound check        History of Present Illness:  Patient is a 48-year-old  male with history of hypertension, hyperlipidemia, type 2 diabetes mellitus, left below the knee amputation (3/2/17) and cellulitis/diabetic ulcer of the right anterior lower extremity who presents to office today for evaluation of right anterior shin ulceration.  The patient was last seen on 8/21/18 and denies any interval pain, nausea, vomiting, fever, chills or night sweats.  The patient further denies any purulent discharge from his right anterior shin ulceration site.  The patient has been discharged by Dr. Dong, his infectious disease physician, and is no longer taking antibiotics.  The patient is also no longer receiving home health or wound VAC therapy.  His wife is performing daily once per day dressing changes to his right shin ulceration.  The patient is otherwise doing well.    Patient Active Problem List   Diagnosis   • DUNLAP Cirrhosis   • Essential hypertension   • Non-compliance   • Hyperlipemia   • Wound infection of left leg   • Type 2 diabetes mellitus with circulatory disorder and uncontrolled   • Dysthymia   • History of MRSA infection   • Diabetic ulcer of right lower leg (CMS/HCC)   • Cellulitis of right anterior lower leg   • JUAN (acute kidney injury) (CMS/HCC)   • S/P BKA (below knee amputation), left (CMS/HCC)     Past Medical History:   Diagnosis Date   • JUAN (acute kidney injury) (CMS/HCC) 7/29/2018   • Arthritis    • Cellulitis of right anterior lower leg 7/29/2018   • Cirrhosis (CMS/HCC)    • Counseling for insulin pump    • Depression    • Diabetes  mellitus (CMS/HCC)    • Encephalopathy, hepatic (CMS/HCC)    • H/O degenerative disc disease    • History of Lissa's gangrene    • Hyperlipemia    • Hypertension    • multiple Skin abscesses    • DUNLAP, bx showed stage IV fibrosis    • Neuropathy    • Peripheral vascular disease (CMS/HCC)      Past Surgical History:   Procedure Laterality Date   • AMPUTATION DIGIT Left 1/30/2017    Procedure: left fourth and fifth transmetatarsal toe amputation ;  Surgeon: Juancho Martinez MD;  Location:  [facility];  Service:    • BELOW KNEE AMPUTATION Left 3/2/2017    Procedure: AMPUTATION BELOW KNEE, SHMUEL;  Surgeon: Juancho Martinez MD;  Location:  [facility];  Service:    • LEG SURGERY     • TESTICLE SURGERY      DEBRIDEMENT FROM GANGRENE       Current Outpatient Prescriptions:   •  acetaminophen (TYLENOL) 325 MG tablet, Take 2 tablets by mouth Every 4 (Four) Hours As Needed for mild pain (1-3)., Disp: , Rfl: 0  •  gabapentin (NEURONTIN) 400 MG capsule, Take 3 capsules by mouth 3 (Three) Times a Day., Disp: , Rfl:   •  HYDROcodone-acetaminophen (NORCO)  MG per tablet, Take 1 tablet by mouth 3 (Three) Times a Day As Needed for Moderate Pain  or Severe Pain ., Disp: , Rfl:   •  insulin detemir (LEVEMIR) 100 UNIT/ML injection, Inject 70 Units under the skin into the appropriate area as directed 2 (Two) Times a Day., Disp: 10 mL, Rfl: 12  •  Insulin Lispro (HUMALOG) 100 UNIT/ML solution pen-injector, Inject 30 Units under the skin into the appropriate area as directed 3 (Three) Times a Day With Meals., Disp: 3 mL, Rfl: 0  •  linezolid (ZYVOX) 600 MG tablet, Take 1 tablet by mouth Every 12 (Twelve) Hours., Disp: 14 tablet, Rfl: 0  •  lisinopril (PRINIVIL,ZESTRIL) 40 MG tablet, Take 1 tablet by mouth Daily., Disp: 30 tablet, Rfl: 0  •  metFORMIN (GLUCOPHAGE) 1000 MG tablet, Take 1 tablet by mouth Daily With Breakfast., Disp: 30 tablet, Rfl: 0  •  probiotic (CULTURELLE) capsule capsule, Take 1 capsule by mouth Daily., Disp: 30 capsule,  Rfl: 0  Allergies   Allergen Reactions   • No Known Drug Allergy      Social History     Social History   • Marital status:      Spouse name: N/A   • Number of children: 1   • Years of education: N/A     Occupational History   • Security      Disabled-Diabetic Retinopathy     Social History Main Topics   • Smoking status: Never Smoker   • Smokeless tobacco: Never Used   • Alcohol use No   • Drug use: No   • Sexual activity: Defer     Other Topics Concern   • Not on file     Social History Narrative   • No narrative on file     Family History   Problem Relation Age of Onset   • Arthritis Mother    • Hypertension Mother    • Kidney disease Mother    • Stroke Mother    • Heart attack Father    • Arthritis Father    • Diabetes Father    • Hyperlipidemia Father    • Hypertension Father    • Mental illness Sister    • Diabetes Brother    • Hypertension Brother    • Obesity Brother      Review of Systems   Constitution: Negative for chills, fever, malaise/fatigue, night sweats and weight loss.   HENT: Negative for congestion, hearing loss, odynophagia and sore throat.    Eyes: Negative for blurred vision and double vision.   Cardiovascular: Negative for chest pain, dyspnea on exertion, leg swelling, orthopnea and palpitations.   Respiratory: Negative for cough, hemoptysis and shortness of breath.    Endocrine: Negative for cold intolerance, heat intolerance, polydipsia, polyphagia and polyuria.   Hematologic/Lymphatic: Does not bruise/bleed easily.   Skin: Positive for poor wound healing. Negative for itching and rash.   Musculoskeletal: Positive for falls. Negative for joint pain, joint swelling, muscle cramps, muscle weakness and myalgias.   Gastrointestinal: Negative for abdominal pain, constipation, diarrhea, hematemesis, hematochezia, melena, nausea and vomiting.   Genitourinary: Negative for dysuria, frequency and hematuria.   Neurological: Positive for dizziness and light-headedness. Negative for focal  "weakness, headaches, loss of balance, numbness, paresthesias and seizures.   Psychiatric/Behavioral: Negative for depression and suicidal ideas. The patient is not nervous/anxious.    All other systems reviewed and are negative.    Vitals:    10/16/18 1008   BP: 138/80   BP Location: Left arm   Patient Position: Sitting   Pulse: 99   Temp: 98.1 °F (36.7 °C)   SpO2: 96%   Weight: 111 kg (244 lb)   Height: 190.5 cm (75\")      Physical Exam:  Gen - NAD, WNL  CV - RRR, without gallops, rub or murmur  Pulm - Lungs clear to auscultation, without rhonchi or rales  Abd - Soft, normoactive bowel sounds, non-tender   Ext - Left BKA with prosthetic in place; Right shin ulceration has decreased in circumference to 3.5 cm x 3 cm from 4x6cm  Skin - Warm and dry     Assessment/Plan:  Patient is a 48-year-old  male with history of hypertension, hyperlipidemia, type 2 diabetes mellitus, left below the knee amputation (3/2/17) and cellulitis/diabetic ulcer of the right anterior lower extremity who presents to office today for evaluation of right anterior shin ulceration.  The patient has been doing well since she was last seen on 8/21/18.  Upon physical examination, there was no evidence of purulent discharge or worsening of the right anterior shin ulceration, and fact, it has decreased circumferentially in size.  The patient should continue daily dressing changes.  I applied silver nitrate to the granulation tissue, to aid in wound healing.  The patient will need to follow-up in 2 months to ensure complete wound healing.  If he has any questions or concerns, during the interval, he may call our office at any time.  If the patient develops any acutely worsening symptoms, he will need to proceed to the nearest emergency department immediately.     Patient Active Problem List   Diagnosis   • DUNLAP Cirrhosis   • Essential hypertension   • Non-compliance   • Hyperlipemia   • Wound infection of left leg   • Type 2 diabetes " mellitus with circulatory disorder and uncontrolled   • Dysthymia   • History of MRSA infection   • Diabetic ulcer of right lower leg (CMS/[facility])   • Cellulitis of right anterior lower leg   • JUAN (acute kidney injury) (CMS/[facility])   • S/P BKA (below knee amputation), left (CMS/[facility])     Signed by:

## 2018-10-25 ENCOUNTER — HOSPITAL ENCOUNTER (EMERGENCY)
Facility: HOSPITAL | Age: 48
Discharge: HOME | End: 2018-10-26
Attending: EMERGENCY MEDICINE | Admitting: EMERGENCY MEDICINE

## 2018-10-25 ENCOUNTER — TELEPHONE (OUTPATIENT)
Dept: CARDIAC SURGERY | Facility: CLINIC | Age: 48
End: 2018-10-25

## 2018-10-25 DIAGNOSIS — Z51.89 ENCOUNTER FOR WOUND RE-CHECK: Primary | ICD-10-CM

## 2018-10-25 PROCEDURE — 99284 EMERGENCY DEPT VISIT MOD MDM: CPT

## 2018-10-25 NOTE — TELEPHONE ENCOUNTER
"Mr. Crystal's wife, Cindy called today regarding his wound. Last night he started to have pain from right shin wound radiating up to right hip. Wound has \"milky color\" discharge and he has been experiencing leg \"twitches\". He has also been having chills. No warmness around wound. Please advise. Thanks    Carlyle    Left message for Cindy to call our office back to make appointment with PA.    "

## 2018-10-26 VITALS
TEMPERATURE: 97.8 F | BODY MASS INDEX: 32.08 KG/M2 | HEART RATE: 97 BPM | SYSTOLIC BLOOD PRESSURE: 163 MMHG | OXYGEN SATURATION: 96 % | HEIGHT: 74 IN | WEIGHT: 250 LBS | RESPIRATION RATE: 18 BRPM | DIASTOLIC BLOOD PRESSURE: 84 MMHG

## 2018-10-26 PROCEDURE — 25010000002 HYDROMORPHONE 1 MG/ML SOLUTION: Performed by: EMERGENCY MEDICINE

## 2018-10-26 PROCEDURE — 96372 THER/PROPH/DIAG INJ SC/IM: CPT

## 2018-10-26 RX ADMIN — HYDROMORPHONE HYDROCHLORIDE 1 MG: 1 INJECTION, SOLUTION INTRAMUSCULAR; INTRAVENOUS; SUBCUTANEOUS at 01:25

## 2018-10-26 NOTE — ED PROVIDER NOTES
Subjective   Mr. Kendrick Crystal is a 48 y.o. male who presents to the ED with c/o RLE pain onset 1 day ago. Family reports approximately 3 months ago pt developed a diabetic ulcer with gangrene to anterior aspect of right lower leg. He was evaluated by Dr. Martinez, vascular surgeon, who performed a debridement and placed a wound vac, which remained in place for 2.5 months. Since wound vac removal, the wound has been healing well and pt has not experienced pain, however last night pt had sudden onset of pain at wound site which radiated up RLE to R hip. The pain has remained waxing and waning since and family notes the wound appears somewhat worse compared to baseline. Pt also complains of dry cough, however he denies fever, rhinorrhea, congestion, blood in stool, melena, N/V/D, and any other acute sx at this time. Past medical hx significant for hepatic encephalopathy, DM (treated with only metformin), neuropathy, HTN, DDD, Lissa's gangrene, DUNLAP, HLD, cirrhosis, PVD, depression, Left JUAN, testicle surgery. He is not currently on antibiotics and he is not followed by an endocrinologist.         History provided by:  Patient  Lower Extremity Issue   Location:  Leg  Leg location:  R lower leg  Pain details:     Severity:  Moderate    Onset quality:  Sudden    Duration:  1 day    Timing:  Constant    Progression:  Waxing and waning  Chronicity:  New  Relieved by:  None tried  Worsened by:  Nothing  Ineffective treatments:  None tried  Associated symptoms: no fever        Review of Systems   Constitutional: Negative for fever.   HENT: Negative for congestion and rhinorrhea.    Respiratory: Positive for cough.    Gastrointestinal: Negative for blood in stool, diarrhea, nausea and vomiting.   Musculoskeletal: Positive for arthralgias (RLE).   Skin: Positive for wound (RLE).   All other systems reviewed and are negative.      Past Medical History:   Diagnosis Date   • JUAN (acute kidney injury) (CMS/[facility]) 7/29/2018   •  Arthritis    • Cellulitis of right anterior lower leg 7/29/2018   • Cirrhosis (CMS/HCC)    • Counseling for insulin pump    • Depression    • Diabetes mellitus (CMS/HCC)    • Encephalopathy, hepatic (CMS/HCC)    • H/O degenerative disc disease    • History of Lissa's gangrene    • Hyperlipemia    • Hypertension    • multiple Skin abscesses    • DUNLAP, bx showed stage IV fibrosis    • Neuropathy    • Peripheral vascular disease (CMS/HCC)        Allergies   Allergen Reactions   • No Known Drug Allergy        Past Surgical History:   Procedure Laterality Date   • AMPUTATION DIGIT Left 1/30/2017    Procedure: left fourth and fifth transmetatarsal toe amputation ;  Surgeon: Juancho Martinez MD;  Location:  [facility];  Service:    • BELOW KNEE AMPUTATION Left 3/2/2017    Procedure: AMPUTATION BELOW KNEE, SHMUEL;  Surgeon: Juancho Martinez MD;  Location:  [facility];  Service:    • LEG SURGERY     • TESTICLE SURGERY      DEBRIDEMENT FROM GANGRENE       Family History   Problem Relation Age of Onset   • Arthritis Mother    • Hypertension Mother    • Kidney disease Mother    • Stroke Mother    • Heart attack Father    • Arthritis Father    • Diabetes Father    • Hyperlipidemia Father    • Hypertension Father    • Mental illness Sister    • Diabetes Brother    • Hypertension Brother    • Obesity Brother        Social History     Social History   • Marital status:    • Number of children: 1     Occupational History   • Security      Disabled-Diabetic Retinopathy     Social History Main Topics   • Smoking status: Never Smoker   • Smokeless tobacco: Never Used   • Alcohol use No   • Drug use: No   • Sexual activity: Defer     Other Topics Concern   • Not on file         Objective   Physical Exam   Constitutional: He is oriented to person, place, and time. He appears well-developed and well-nourished. No distress.   Pt awake and alert with normal mentation.    HENT:   Head: Normocephalic and atraumatic.   Eyes: Pupils are equal,  "round, and reactive to light. Conjunctivae and EOM are normal.   Neck: Normal range of motion. Neck supple.   Cardiovascular: Normal rate, regular rhythm and normal heart sounds.  Exam reveals no gallop and no friction rub.    No murmur heard.  Pulmonary/Chest: Effort normal and breath sounds normal. No respiratory distress. He has no wheezes. He has no rales.   Lungs clear diffusely.    Abdominal: Soft. Bowel sounds are normal. There is no tenderness.   Musculoskeletal: Normal range of motion.   Prosthesis LLE    Neurological: He is alert and oriented to person, place, and time. GCS eye subscore is 4. GCS verbal subscore is 5. GCS motor subscore is 6.   Skin: Skin is warm and dry. He is not diaphoretic.        Psychiatric: He has a normal mood and affect. His behavior is normal.   Nursing note and vitals reviewed.      Procedures         ED Course     No results found for this or any previous visit (from the past 24 hour(s)).  Note: In addition to lab results from this visit, the labs listed above may include labs taken at another facility or during a different encounter within the last 24 hours. Please correlate lab times with ED admission and discharge times for further clarification of the services performed during this visit.    No orders to display     Vitals:    10/25/18 2319 10/25/18 2340 10/26/18 0051 10/26/18 0053   BP: (!) 193/96 163/80 163/84    Pulse: 97      Resp: 18      Temp: 97.8 °F (36.6 °C)      TempSrc: Oral      SpO2: 95% 98%  96%   Weight: 113 kg (250 lb)      Height: 188 cm (74\")        Medications   HYDROmorphone (DILAUDID) injection 1 mg (1 mg Intramuscular Given 10/26/18 0125)     ECG/EMG Results (last 24 hours)     ** No results found for the last 24 hours. **                      MDM  Number of Diagnoses or Management Options  Encounter for wound re-check: new and requires workup  Diagnosis management comments: Patient's current right leg Chin wound is clean dry and intact with no " surrounding cellulitis and no purulent drainage.  No signs of current infection, and no purulent material can be expressed.    Patient has multiple medical problems that could be contributing to patient's current right leg pain including poorly controlled diabetes that might be contributing to peripheral neuropathy.  And very poor peripheral vascular disease.    Currently there are no overt signs of lower extremity ischemia, and patient has palpable pulses, and doppler that shows good pulses. Wound is clean dry and intact, no signs of infection.        Amount and/or Complexity of Data Reviewed  Decide to obtain previous medical records or to obtain history from someone other than the patient: yes  Obtain history from someone other than the patient: yes  Review and summarize past medical records: yes  Independent visualization of images, tracings, or specimens: yes    Patient Progress  Patient progress: stable      Final diagnoses:   Encounter for wound re-check       Documentation assistance provided by andrea Méndez.  Information recorded by the scribe was done at my direction and has been verified and validated by me.     Jeyson Méndez  10/26/18 0146       Andreas Chawla MD  10/26/18 0241

## 2018-10-26 NOTE — DISCHARGE INSTRUCTIONS
Patient is advised to perform wet to dry dressing change to right leg wound twice a day.     Take pain medication as prescribed.      Contact Dr. Martinez for outpatient follow-up within next week.

## 2018-10-31 ENCOUNTER — OFFICE VISIT (OUTPATIENT)
Dept: CARDIAC SURGERY | Facility: CLINIC | Age: 48
End: 2018-10-31

## 2018-10-31 VITALS
TEMPERATURE: 97.8 F | WEIGHT: 244 LBS | OXYGEN SATURATION: 96 % | SYSTOLIC BLOOD PRESSURE: 170 MMHG | HEIGHT: 75 IN | HEART RATE: 93 BPM | DIASTOLIC BLOOD PRESSURE: 110 MMHG | BODY MASS INDEX: 30.34 KG/M2

## 2018-10-31 DIAGNOSIS — E11.622 DIABETIC ULCER OF RIGHT LOWER LEG (HCC): Primary | ICD-10-CM

## 2018-10-31 DIAGNOSIS — L97.919 DIABETIC ULCER OF RIGHT LOWER LEG (HCC): Primary | ICD-10-CM

## 2018-10-31 PROCEDURE — 99213 OFFICE O/P EST LOW 20 MIN: CPT | Performed by: PHYSICIAN ASSISTANT

## 2018-12-13 ENCOUNTER — TELEPHONE (OUTPATIENT)
Dept: CARDIAC SURGERY | Facility: CLINIC | Age: 48
End: 2018-12-13

## 2018-12-13 NOTE — TELEPHONE ENCOUNTER
Cindy, the patient's wife, called concerning the diabetic ulcer on the patient's right lower extremity. Stated that the tissue around the area had begun to look softer and have more of yellow color to it again. She sent a picture of the patient's wound to my work email so that LAUREN Villasenor could look at the picture and determine if the patient needed to have an office visit or not. Andrei looked at the photo and stated that the wound looked very good still and that she should just keep an eye on it and if it were to worsen then she should call our office back. Cindy knows to call with any other questions or concerns she has.

## 2019-01-01 ENCOUNTER — TELEPHONE (OUTPATIENT)
Dept: FAMILY MEDICINE CLINIC | Facility: CLINIC | Age: 49
End: 2019-01-01

## 2019-01-03 ENCOUNTER — HOSPITAL ENCOUNTER (EMERGENCY)
Facility: HOSPITAL | Age: 49
Discharge: LEFT BEFORE DISCHARGE | End: 2019-01-04

## 2019-01-03 VITALS
TEMPERATURE: 98.5 F | BODY MASS INDEX: 29.84 KG/M2 | WEIGHT: 240 LBS | DIASTOLIC BLOOD PRESSURE: 90 MMHG | HEART RATE: 96 BPM | OXYGEN SATURATION: 96 % | HEIGHT: 75 IN | RESPIRATION RATE: 16 BRPM | SYSTOLIC BLOOD PRESSURE: 160 MMHG

## 2019-01-03 LAB
ALBUMIN SERPL-MCNC: 4.12 G/DL (ref 3.2–4.8)
ALBUMIN/GLOB SERPL: 1.6 G/DL (ref 1.5–2.5)
ALP SERPL-CCNC: 120 U/L (ref 25–100)
ALT SERPL W P-5'-P-CCNC: 31 U/L (ref 7–40)
ANION GAP SERPL CALCULATED.3IONS-SCNC: 10 MMOL/L (ref 3–11)
AST SERPL-CCNC: 25 U/L (ref 0–33)
BASOPHILS # BLD AUTO: 0.05 10*3/MM3 (ref 0–0.2)
BASOPHILS NFR BLD AUTO: 0.5 % (ref 0–1)
BILIRUB SERPL-MCNC: 0.3 MG/DL (ref 0.3–1.2)
BUN BLD-MCNC: 16 MG/DL (ref 9–23)
BUN/CREAT SERPL: 16.5 (ref 7–25)
CALCIUM SPEC-SCNC: 9.1 MG/DL (ref 8.7–10.4)
CHLORIDE SERPL-SCNC: 90 MMOL/L (ref 99–109)
CO2 SERPL-SCNC: 26 MMOL/L (ref 20–31)
CREAT BLD-MCNC: 0.97 MG/DL (ref 0.6–1.3)
DEPRECATED RDW RBC AUTO: 41.1 FL (ref 37–54)
EOSINOPHIL # BLD AUTO: 0.15 10*3/MM3 (ref 0–0.3)
EOSINOPHIL NFR BLD AUTO: 1.6 % (ref 0–3)
ERYTHROCYTE [DISTWIDTH] IN BLOOD BY AUTOMATED COUNT: 13.6 % (ref 11.3–14.5)
GFR SERPL CREATININE-BSD FRML MDRD: 82 ML/MIN/1.73
GLOBULIN UR ELPH-MCNC: 2.6 GM/DL
GLUCOSE BLD-MCNC: 622 MG/DL (ref 70–100)
HCT VFR BLD AUTO: 42.7 % (ref 38.9–50.9)
HGB BLD-MCNC: 14.4 G/DL (ref 13.1–17.5)
HOLD SPECIMEN: NORMAL
HOLD SPECIMEN: NORMAL
IMM GRANULOCYTES # BLD AUTO: 0.05 10*3/MM3 (ref 0–0.03)
IMM GRANULOCYTES NFR BLD AUTO: 0.5 % (ref 0–0.6)
LYMPHOCYTES # BLD AUTO: 2.94 10*3/MM3 (ref 0.6–4.8)
LYMPHOCYTES NFR BLD AUTO: 31.1 % (ref 24–44)
MCH RBC QN AUTO: 28.2 PG (ref 27–31)
MCHC RBC AUTO-ENTMCNC: 33.7 G/DL (ref 32–36)
MCV RBC AUTO: 83.6 FL (ref 80–99)
MONOCYTES # BLD AUTO: 0.4 10*3/MM3 (ref 0–1)
MONOCYTES NFR BLD AUTO: 4.2 % (ref 0–12)
NEUTROPHILS # BLD AUTO: 5.92 10*3/MM3 (ref 1.5–8.3)
NEUTROPHILS NFR BLD AUTO: 62.6 % (ref 41–71)
PLATELET # BLD AUTO: 208 10*3/MM3 (ref 150–450)
PMV BLD AUTO: 12.4 FL (ref 6–12)
POTASSIUM BLD-SCNC: 4.2 MMOL/L (ref 3.5–5.5)
PROT SERPL-MCNC: 6.7 G/DL (ref 5.7–8.2)
RBC # BLD AUTO: 5.11 10*6/MM3 (ref 4.2–5.76)
SODIUM BLD-SCNC: 126 MMOL/L (ref 132–146)
WBC NRBC COR # BLD: 9.46 10*3/MM3 (ref 3.5–10.8)
WHOLE BLOOD HOLD SPECIMEN: NORMAL
WHOLE BLOOD HOLD SPECIMEN: NORMAL

## 2019-01-03 PROCEDURE — 80053 COMPREHEN METABOLIC PANEL: CPT

## 2019-01-03 PROCEDURE — 99211 OFF/OP EST MAY X REQ PHY/QHP: CPT

## 2019-01-03 PROCEDURE — 85025 COMPLETE CBC W/AUTO DIFF WBC: CPT

## 2019-01-03 RX ORDER — SODIUM CHLORIDE 0.9 % (FLUSH) 0.9 %
10 SYRINGE (ML) INJECTION AS NEEDED
Status: DISCONTINUED | OUTPATIENT
Start: 2019-01-03 | End: 2019-01-04 | Stop reason: HOSPADM

## 2019-01-04 ENCOUNTER — TELEPHONE (OUTPATIENT)
Dept: EMERGENCY DEPT | Facility: HOSPITAL | Age: 49
End: 2019-01-04

## 2019-01-04 ENCOUNTER — OFFICE VISIT (OUTPATIENT)
Dept: INTERNAL MEDICINE | Facility: CLINIC | Age: 49
End: 2019-01-04

## 2019-01-04 VITALS
WEIGHT: 242 LBS | HEIGHT: 75 IN | RESPIRATION RATE: 20 BRPM | DIASTOLIC BLOOD PRESSURE: 98 MMHG | HEART RATE: 89 BPM | SYSTOLIC BLOOD PRESSURE: 162 MMHG | BODY MASS INDEX: 30.09 KG/M2

## 2019-01-04 DIAGNOSIS — Z89.512 S/P BKA (BELOW KNEE AMPUTATION), LEFT (HCC): ICD-10-CM

## 2019-01-04 DIAGNOSIS — L03.115 CELLULITIS AND ABSCESS OF RIGHT LOWER EXTREMITY: ICD-10-CM

## 2019-01-04 DIAGNOSIS — E11.622 DIABETIC ULCER OF RIGHT LOWER LEG (HCC): ICD-10-CM

## 2019-01-04 DIAGNOSIS — E11.52 TYPE 2 DIABETES MELLITUS WITH DIABETIC PERIPHERAL ANGIOPATHY AND GANGRENE, WITH LONG-TERM CURRENT USE OF INSULIN (HCC): Primary | Chronic | ICD-10-CM

## 2019-01-04 DIAGNOSIS — Z79.4 TYPE 2 DIABETES MELLITUS WITH DIABETIC PERIPHERAL ANGIOPATHY AND GANGRENE, WITH LONG-TERM CURRENT USE OF INSULIN (HCC): Primary | Chronic | ICD-10-CM

## 2019-01-04 DIAGNOSIS — E11.621 DIABETIC ULCER OF RIGHT HEEL ASSOCIATED WITH TYPE 2 DIABETES MELLITUS, LIMITED TO BREAKDOWN OF SKIN (HCC): ICD-10-CM

## 2019-01-04 DIAGNOSIS — I10 ESSENTIAL HYPERTENSION: ICD-10-CM

## 2019-01-04 DIAGNOSIS — F31.62 BIPOLAR DISORDER, CURRENT EPISODE MIXED, MODERATE (HCC): ICD-10-CM

## 2019-01-04 DIAGNOSIS — L97.411 DIABETIC ULCER OF RIGHT HEEL ASSOCIATED WITH TYPE 2 DIABETES MELLITUS, LIMITED TO BREAKDOWN OF SKIN (HCC): ICD-10-CM

## 2019-01-04 DIAGNOSIS — L02.415 CELLULITIS AND ABSCESS OF RIGHT LOWER EXTREMITY: ICD-10-CM

## 2019-01-04 DIAGNOSIS — I73.9 PERIPHERAL VASCULAR DISEASE (HCC): ICD-10-CM

## 2019-01-04 DIAGNOSIS — L97.919 DIABETIC ULCER OF RIGHT LOWER LEG (HCC): ICD-10-CM

## 2019-01-04 PROCEDURE — 99214 OFFICE O/P EST MOD 30 MIN: CPT | Performed by: NURSE PRACTITIONER

## 2019-01-04 PROCEDURE — 83036 HEMOGLOBIN GLYCOSYLATED A1C: CPT | Performed by: NURSE PRACTITIONER

## 2019-01-04 RX ORDER — DOXYCYCLINE HYCLATE 100 MG/1
100 TABLET, DELAYED RELEASE ORAL 2 TIMES DAILY
Qty: 20 TABLET | Refills: 0 | Status: SHIPPED | OUTPATIENT
Start: 2019-01-04 | End: 2019-04-24

## 2019-01-04 RX ORDER — BLOOD-GLUCOSE METER
1 KIT MISCELLANEOUS DAILY
Qty: 1 EACH | Refills: 0 | Status: SHIPPED | OUTPATIENT
Start: 2019-01-04 | End: 2019-05-30

## 2019-01-04 RX ORDER — LISINOPRIL 40 MG/1
40 TABLET ORAL DAILY
Qty: 30 TABLET | Refills: 2 | Status: SHIPPED | OUTPATIENT
Start: 2019-01-04 | End: 2019-05-28 | Stop reason: HOSPADM

## 2019-01-04 RX ORDER — LACTOBACILLUS RHAMNOSUS GG 10B CELL
1 CAPSULE ORAL DAILY
Qty: 30 CAPSULE | Refills: 2 | Status: ON HOLD | OUTPATIENT
Start: 2019-01-04 | End: 2019-04-25

## 2019-01-04 RX ORDER — OFLOXACIN 3 MG/ML
SOLUTION/ DROPS OPHTHALMIC
COMMUNITY
Start: 2018-11-02 | End: 2019-01-04

## 2019-01-04 RX ORDER — DULOXETIN HYDROCHLORIDE 30 MG/1
30 CAPSULE, DELAYED RELEASE ORAL DAILY
Qty: 30 CAPSULE | Refills: 2 | Status: SHIPPED | OUTPATIENT
Start: 2019-01-04 | End: 2019-06-11 | Stop reason: SDUPTHER

## 2019-01-04 RX ORDER — CEPHALEXIN 500 MG/1
500 CAPSULE ORAL 2 TIMES DAILY
Qty: 20 CAPSULE | Refills: 0 | Status: SHIPPED | OUTPATIENT
Start: 2019-01-04 | End: 2019-04-24

## 2019-01-04 RX ORDER — TRIPROLIDINE/PSEUDOEPHEDRINE 2.5MG-60MG
1 TABLET ORAL 4 TIMES DAILY
COMMUNITY
Start: 2018-11-02 | End: 2019-05-30

## 2019-01-04 NOTE — PROGRESS NOTES
Subjective   Kendrick Crystal is a 48 y.o. male here today for establish and DM    Chief Complaint   Patient presents with   • Hypertension     Glucose monitor   • Blood Sugar Problem     Kendrick is here today with a 15 year history of DM2- He has been off of his medications for about a month- no longer has a PCP.  He went to the ED last night with worsening erythema of this RLE cellulitis but left before the visit was complete as they were very busy.  His blood sugar in the ED was over 600. He needs all of his medications and a glucometer.  He is unsure of his last A1C but believes it was 12 or 14.  He has been to an endocrinologist- Dr Mcdonald but it has been a few years since he went there.  He does has had a BKA of his left leg- followed by Dr. Martinez for the right lower extremity. He has been see Andrei Melendez for his DM ulceration of RLE and was told to go to the ED should the area become more erythematous or worsen which is why he went to the ED last night.  He denies fever or chills- wound is not weeping but has warmth and redness. He also has another area of breakdown on his heel of his RLE.    Bipolar disorder: mixed allan and depression. Also associated with insomnia.  He had previously done well on the duloxetine.  Would like to go back on this today.    Sees pain clinic for chronic pain and neuropathy  Review of Systems   Constitutional: Positive for fatigue. Negative for chills, fever, unexpected weight gain and unexpected weight loss.   Respiratory: Positive for cough. Negative for shortness of breath.    Cardiovascular: Negative for chest pain and palpitations.   Gastrointestinal: Negative for blood in stool, diarrhea, nausea and vomiting.   Endocrine: Positive for polydipsia. Negative for polyphagia and polyuria.   Genitourinary: Negative.    Musculoskeletal: Positive for arthralgias, back pain and myalgias.   Skin: Positive for color change, dry skin and skin lesions. Negative for rash.    Allergic/Immunologic: Negative.    Neurological: Negative for dizziness, seizures, weakness, headache, memory problem and confusion.   Psychiatric/Behavioral: Positive for sleep disturbance and depressed mood. Negative for self-injury and suicidal ideas. The patient is nervous/anxious.        Past Medical History:   Diagnosis Date   • JUAN (acute kidney injury) (CMS/HCC) 7/29/2018   • Arthritis    • Bipolar 1 disorder (CMS/[facility])    • Cellulitis of right anterior lower leg 7/29/2018   • Cirrhosis (CMS/[facility])    • Counseling for insulin pump    • Depression    • Diabetes mellitus (CMS/HCC)    • Encephalopathy, hepatic (CMS/[facility])    • H/O degenerative disc disease    • History of Lissa's gangrene    • Hyperlipemia    • Hypertension    • multiple Skin abscesses    • DUNLAP, bx showed stage IV fibrosis    • Neuropathy    • Peripheral vascular disease (CMS/HCC)    • Thrombophlebitis      Family History   Problem Relation Age of Onset   • Arthritis Mother    • Hypertension Mother    • Kidney disease Mother    • Stroke Mother    • Heart attack Father    • Arthritis Father    • Diabetes Father    • Hyperlipidemia Father    • Hypertension Father    • Mental illness Sister    • Diabetes Brother    • Hypertension Brother    • Obesity Brother      Past Surgical History:   Procedure Laterality Date   • AMPUTATION DIGIT Left 1/30/2017    Procedure: left fourth and fifth transmetatarsal toe amputation ;  Surgeon: Juancho Martinez MD;  Location:  [facility];  Service:    • BELOW KNEE AMPUTATION Left 3/2/2017    Procedure: AMPUTATION BELOW KNEE, SHMUEL;  Surgeon: Juancho Martinez MD;  Location:  [facility];  Service:    • LEG SURGERY     • TESTICLE SURGERY      DEBRIDEMENT FROM GANGRENE     Social History     Socioeconomic History   • Marital status:      Spouse name: Not on file   • Number of children: 1   • Years of education: Not on file   • Highest education level: Not on file   Social Needs   • Financial resource strain: Not on file    • Food insecurity - worry: Not on file   • Food insecurity - inability: Not on file   • Transportation needs - medical: Not on file   • Transportation needs - non-medical: Not on file   Occupational History   • Occupation: Security     Comment: Disabled-Diabetic Retinopathy   Tobacco Use   • Smoking status: Never Smoker   • Smokeless tobacco: Never Used   Substance and Sexual Activity   • Alcohol use: No   • Drug use: No   • Sexual activity: Defer   Other Topics Concern   • Not on file   Social History Narrative    Lives in [facility]         Current Outpatient Medications:   •  acetaminophen (TYLENOL) 325 MG tablet, Take 2 tablets by mouth Every 4 (Four) Hours As Needed for mild pain (1-3)., Disp: , Rfl: 0  •  cephalexin (KEFLEX) 500 MG capsule, Take 1 capsule by mouth 2 (Two) Times a Day., Disp: 20 capsule, Rfl: 0  •  doxycycline (DORYX) 100 MG enteric coated tablet, Take 1 tablet by mouth 2 (Two) Times a Day., Disp: 20 tablet, Rfl: 0  •  DULoxetine (CYMBALTA) 30 MG capsule, Take 1 capsule by mouth Daily., Disp: 30 capsule, Rfl: 2  •  DUREZOL 0.05 % ophthalmic emulsion, , Disp: , Rfl:   •  gabapentin (NEURONTIN) 400 MG capsule, Take 3 capsules by mouth 3 (Three) Times a Day., Disp: , Rfl:   •  glucose blood test strip, Check blood sugar 1-2 times daily, Disp: 50 each, Rfl: 12  •  glucose monitor monitoring kit, 1 each Daily., Disp: 1 each, Rfl: 0  •  HYDROcodone-acetaminophen (NORCO)  MG per tablet, Take 1 tablet by mouth 3 (Three) Times a Day As Needed for Moderate Pain  or Severe Pain ., Disp: , Rfl:   •  insulin detemir (LEVEMIR) 100 UNIT/ML injection, Inject 70 Units under the skin into the appropriate area as directed 2 (Two) Times a Day., Disp: 10 mL, Rfl: 12  •  Insulin Lispro (HUMALOG) 100 UNIT/ML solution pen-injector, Inject 30 Units under the skin into the appropriate area as directed 3 (Three) Times a Day With Meals., Disp: 6 pen, Rfl: 3  •  Insulin Pen Needle (ADVOCATE INSULIN PEN NEEDLES)  "33G X 4 MM misc, 1 application 3 (Three) Times a Day., Disp: 100 each, Rfl: 11  •  Insulin Syringe-Needle U-100 27G X 5/8\" 1 ML misc, 1 syringe 2 (Two) Times a Day., Disp: 100 each, Rfl: 11  •  Lancets (ACCU-CHEK SOFT TOUCH) lancets, 3 times a day, Disp: 100 each, Rfl: 12  •  lisinopril (PRINIVIL,ZESTRIL) 40 MG tablet, Take 1 tablet by mouth Daily., Disp: 30 tablet, Rfl: 2  •  metFORMIN (GLUCOPHAGE) 1000 MG tablet, Take 1 tablet by mouth Daily With Breakfast., Disp: 30 tablet, Rfl: 2  •  probiotic (CULTURELLE) capsule capsule, Take 1 capsule by mouth Daily., Disp: 30 capsule, Rfl: 2  No current facility-administered medications for this visit.     Objective   Vitals:    01/04/19 1451   BP: 162/98   Pulse: 89   Resp: 20   Weight: 110 kg (242 lb)   Height: 190.5 cm (75\")     Body mass index is 30.25 kg/m².    Physical Exam   Constitutional: He is oriented to person, place, and time. He appears well-developed and well-nourished. No distress.   Eyes: Pupils are equal, round, and reactive to light.   Neck: Neck supple. No thyromegaly present.   Cardiovascular: Normal rate and regular rhythm.   Pulmonary/Chest: Effort normal and breath sounds normal.   Neurological: He is alert and oriented to person, place, and time.   Skin: Skin is warm and dry. Capillary refill takes 2 to 3 seconds. Lesion noted. He is not diaphoretic. Nails show no clubbing.        Psychiatric: He has a normal mood and affect. His behavior is normal. Judgment and thought content normal.   Nursing note and vitals reviewed.      Assessment/Plan   Problem List Items Addressed This Visit     Essential hypertension    Relevant Medications    lisinopril (PRINIVIL,ZESTRIL) 40 MG tablet    Other Relevant Orders    Blood Pressure Device    Type 2 diabetes mellitus with circulatory disorder and uncontrolled - Primary (Chronic)    Relevant Medications    Insulin Lispro (HUMALOG) 100 UNIT/ML solution pen-injector    insulin detemir (LEVEMIR) 100 UNIT/ML injection " "   metFORMIN (GLUCOPHAGE) 1000 MG tablet    glucose monitor monitoring kit    glucose blood test strip    Lancets (ACCU-CHEK SOFT TOUCH) lancets    Insulin Pen Needle (ADVOCATE INSULIN PEN NEEDLES) 33G X 4 MM misc    Insulin Syringe-Needle U-100 27G X 5/8\" 1 ML misc    Other Relevant Orders    Ambulatory Referral to Wound Clinic    Hemoglobin A1c    Ambulatory Referral to Endocrinology    S/P BKA (below knee amputation), left (CMS/[facility])    Peripheral vascular disease (CMS/[facility])    Relevant Orders    Ambulatory Referral to Wound Clinic      Other Visit Diagnoses     Cellulitis and abscess of right lower extremity        Relevant Medications    doxycycline (DORYX) 100 MG enteric coated tablet    cephalexin (KEFLEX) 500 MG capsule    Other Relevant Orders    Ambulatory Referral to Wound Clinic    Diabetic ulcer of right heel associated with type 2 diabetes mellitus, limited to breakdown of skin (CMS/[facility])        Relevant Medications    Insulin Lispro (HUMALOG) 100 UNIT/ML solution pen-injector    insulin detemir (LEVEMIR) 100 UNIT/ML injection    metFORMIN (GLUCOPHAGE) 1000 MG tablet    Other Relevant Orders    Ambulatory Referral to Wound Clinic    Bipolar disorder, current episode mixed, moderate (CMS/[facility])        Relevant Medications    DULoxetine (CYMBALTA) 30 MG capsule          Kendrick was seen today for hypertension and blood sugar problem.    Diagnoses and all orders for this visit:    Type 2 diabetes mellitus with diabetic peripheral angiopathy and gangrene, with long-term current use of insulin (CMS/[facility])  -     Ambulatory Referral to Wound Clinic  -     Hemoglobin A1c  -     Insulin Lispro (HUMALOG) 100 UNIT/ML solution pen-injector; Inject 30 Units under the skin into the appropriate area as directed 3 (Three) Times a Day With Meals.  -     insulin detemir (LEVEMIR) 100 UNIT/ML injection; Inject 70 Units under the skin into the appropriate area as directed 2 (Two) Times a Day.  -     metFORMIN (GLUCOPHAGE) 1000 MG " "tablet; Take 1 tablet by mouth Daily With Breakfast.  -     Ambulatory Referral to Endocrinology  -     glucose monitor monitoring kit; 1 each Daily.  -     glucose blood test strip; Check blood sugar 1-2 times daily  -     Lancets (ACCU-CHEK SOFT TOUCH) lancets; 3 times a day  -     Insulin Pen Needle (ADVOCATE INSULIN PEN NEEDLES) 33G X 4 MM misc; 1 application 3 (Three) Times a Day.  -     Insulin Syringe-Needle U-100 27G X 5/8\" 1 ML misc; 1 syringe 2 (Two) Times a Day.        -    Refer to endocrinology- medications restarted- FU in 3 days for a recheck- Check blood sugars 3 times a day keep log.  Patient made aware the elevated blood sugars are the cause of his recurrent infections and gangrene. Discussed the nature and increased prevalence of infection with uncontrolled DM. I have discussed with him the risk of early mortality should his noncompliance continue. Pt verbalized understanding and denied further questions at this time.    S/P BKA (below knee amputation), left (CMS/HCC)    Peripheral vascular disease (CMS/HCC)  -     Ambulatory Referral to Wound Clinic    Cellulitis and abscess of right lower extremity  -     Ambulatory Referral to Wound Clinic  -     doxycycline (DORYX) 100 MG enteric coated tablet; Take 1 tablet by mouth 2 (Two) Times a Day.  -     cephalexin (KEFLEX) 500 MG capsule; Take 1 capsule by mouth 2 (Two) Times a Day.      -     Area marked and advised to report to ED should erythema spread beyond the area or fever/ chills start. FU in 3 days  Diabetic ulcer of right heel associated with type 2 diabetes mellitus, limited to breakdown of skin (CMS/[facility])  -     Ambulatory Referral to Wound Clinic    Essential hypertension  -     lisinopril (PRINIVIL,ZESTRIL) 40 MG tablet; Take 1 tablet by mouth Daily.  -     Blood Pressure Device          -   FU in 2 weeks  Bipolar disorder, current episode mixed, moderate (CMS/HCC)  -     DULoxetine (CYMBALTA) 30 MG capsule; Take 1 capsule by mouth " Daily.        -  FU in 2 weeks  Other orders  -     probiotic (CULTURELLE) capsule capsule; Take 1 capsule by mouth Daily.             Plan of care reviewed with the patient at the conclusion of today's visit.  Education was provided regarding diagnosis, management, and any prescribed or recommended OTC medications.  Patient verbalized understanding of and agreement with management plan.     Return in about 3 days (around 1/7/2019).      Keyanna Leone, APRN

## 2019-01-06 LAB
EST. AVERAGE GLUCOSE BLD GHB EST-MCNC: >398 MG/DL
HBA1C MFR BLD: >15.5 % (ref 4.8–5.6)

## 2019-01-08 ENCOUNTER — OFFICE VISIT (OUTPATIENT)
Dept: INTERNAL MEDICINE | Facility: CLINIC | Age: 49
End: 2019-01-08

## 2019-01-08 ENCOUNTER — TELEPHONE (OUTPATIENT)
Dept: INTERNAL MEDICINE | Facility: CLINIC | Age: 49
End: 2019-01-08

## 2019-01-08 VITALS
RESPIRATION RATE: 20 BRPM | HEIGHT: 75 IN | DIASTOLIC BLOOD PRESSURE: 92 MMHG | BODY MASS INDEX: 30.09 KG/M2 | HEART RATE: 90 BPM | OXYGEN SATURATION: 99 % | SYSTOLIC BLOOD PRESSURE: 154 MMHG | WEIGHT: 242 LBS

## 2019-01-08 DIAGNOSIS — R19.7 DIARRHEA, UNSPECIFIED TYPE: ICD-10-CM

## 2019-01-08 DIAGNOSIS — E11.52 TYPE 2 DIABETES MELLITUS WITH DIABETIC PERIPHERAL ANGIOPATHY AND GANGRENE, WITH LONG-TERM CURRENT USE OF INSULIN (HCC): Primary | ICD-10-CM

## 2019-01-08 DIAGNOSIS — Z79.4 TYPE 2 DIABETES MELLITUS WITH DIABETIC PERIPHERAL ANGIOPATHY AND GANGRENE, WITH LONG-TERM CURRENT USE OF INSULIN (HCC): Primary | ICD-10-CM

## 2019-01-08 DIAGNOSIS — Z89.512 S/P BKA (BELOW KNEE AMPUTATION), LEFT (HCC): ICD-10-CM

## 2019-01-08 DIAGNOSIS — L03.115 CELLULITIS OF RIGHT ANTERIOR LOWER LEG: ICD-10-CM

## 2019-01-08 DIAGNOSIS — Z12.11 COLON CANCER SCREENING: ICD-10-CM

## 2019-01-08 LAB
ALBUMIN SERPL-MCNC: 3.94 G/DL (ref 3.2–4.8)
ALBUMIN/GLOB SERPL: 1.6 G/DL (ref 1.5–2.5)
ALP SERPL-CCNC: 123 U/L (ref 25–100)
ALT SERPL W P-5'-P-CCNC: 33 U/L (ref 7–40)
ANION GAP SERPL CALCULATED.3IONS-SCNC: 9 MMOL/L (ref 3–11)
ARTICHOKE IGE QN: 117 MG/DL (ref 0–130)
AST SERPL-CCNC: 23 U/L (ref 0–33)
BASOPHILS # BLD AUTO: 0.05 10*3/MM3 (ref 0–0.2)
BASOPHILS NFR BLD AUTO: 0.5 % (ref 0–1)
BILIRUB SERPL-MCNC: 0.3 MG/DL (ref 0.3–1.2)
BUN BLD-MCNC: 27 MG/DL (ref 9–23)
BUN/CREAT SERPL: 30.7 (ref 7–25)
CALCIUM SPEC-SCNC: 9.7 MG/DL (ref 8.7–10.4)
CHLORIDE SERPL-SCNC: 92 MMOL/L (ref 99–109)
CHOLEST SERPL-MCNC: 231 MG/DL (ref 0–200)
CO2 SERPL-SCNC: 27 MMOL/L (ref 20–31)
CREAT BLD-MCNC: 0.88 MG/DL (ref 0.6–1.3)
DEPRECATED RDW RBC AUTO: 41.7 FL (ref 37–54)
EOSINOPHIL # BLD AUTO: 0.14 10*3/MM3 (ref 0–0.3)
EOSINOPHIL NFR BLD AUTO: 1.4 % (ref 0–3)
ERYTHROCYTE [DISTWIDTH] IN BLOOD BY AUTOMATED COUNT: 13.7 % (ref 11.3–14.5)
GFR SERPL CREATININE-BSD FRML MDRD: 92 ML/MIN/1.73
GLOBULIN UR ELPH-MCNC: 2.5 GM/DL
GLUCOSE BLD-MCNC: 463 MG/DL (ref 70–100)
HCT VFR BLD AUTO: 43.2 % (ref 38.9–50.9)
HDLC SERPL-MCNC: 40 MG/DL (ref 40–60)
HGB BLD-MCNC: 14 G/DL (ref 13.1–17.5)
IMM GRANULOCYTES # BLD AUTO: 0.04 10*3/MM3 (ref 0–0.03)
IMM GRANULOCYTES NFR BLD AUTO: 0.4 % (ref 0–0.6)
LYMPHOCYTES # BLD AUTO: 2.5 10*3/MM3 (ref 0.6–4.8)
LYMPHOCYTES NFR BLD AUTO: 25.8 % (ref 24–44)
MCH RBC QN AUTO: 27.4 PG (ref 27–31)
MCHC RBC AUTO-ENTMCNC: 32.4 G/DL (ref 32–36)
MCV RBC AUTO: 84.5 FL (ref 80–99)
MONOCYTES # BLD AUTO: 0.46 10*3/MM3 (ref 0–1)
MONOCYTES NFR BLD AUTO: 4.7 % (ref 0–12)
NEUTROPHILS # BLD AUTO: 6.54 10*3/MM3 (ref 1.5–8.3)
NEUTROPHILS NFR BLD AUTO: 67.6 % (ref 41–71)
PLATELET # BLD AUTO: 203 10*3/MM3 (ref 150–450)
PMV BLD AUTO: 12.6 FL (ref 6–12)
POTASSIUM BLD-SCNC: 4.5 MMOL/L (ref 3.5–5.5)
PROT SERPL-MCNC: 6.4 G/DL (ref 5.7–8.2)
RBC # BLD AUTO: 5.11 10*6/MM3 (ref 4.2–5.76)
SODIUM BLD-SCNC: 128 MMOL/L (ref 132–146)
TRIGL SERPL-MCNC: 742 MG/DL (ref 0–150)
WBC NRBC COR # BLD: 9.69 10*3/MM3 (ref 3.5–10.8)

## 2019-01-08 PROCEDURE — 80053 COMPREHEN METABOLIC PANEL: CPT | Performed by: NURSE PRACTITIONER

## 2019-01-08 PROCEDURE — 99214 OFFICE O/P EST MOD 30 MIN: CPT | Performed by: NURSE PRACTITIONER

## 2019-01-08 PROCEDURE — 85025 COMPLETE CBC W/AUTO DIFF WBC: CPT | Performed by: NURSE PRACTITIONER

## 2019-01-08 PROCEDURE — 80061 LIPID PANEL: CPT | Performed by: NURSE PRACTITIONER

## 2019-01-08 NOTE — PROGRESS NOTES
Subjective   Kendrick Crystal is a 48 y.o. male here today for DM and cellulitis    Chief Complaint   Patient presents with   • Diabetes   Kendrick is here today for three-day follow-up on cellulitis of his right lower extremity and diabetes.  He has been out of his medication for some time and his A1C was too high to read on the labs.  He was started back on his insulin and reports that his blood sugars are still in the 500s.  He does report feeling better now that he is back on the cymbalta as well.  He is tolerating the Keflex and Doxy and has seen improvement in his cellulitis.  He reports that it is less red and tight.  He has not had any fevers.  He has a wound care appointment for his heel ulcer and cellulitis at the end of the week as well as a CT surgery follow-up.    Blood pressure is coming down nicely.  He is not taking his blood pressure at home As they have not picked up the blood pressure cuff.      Kendrick also reports having had diarrhea for years.  It is sometimes mixed with blood and he has never had a colonoscopy.  He does have a history of C. Difficile she has been on multiple antibiotics off and on for years to treat his cellulitis and gangrene.     Kendrick is wheelchair bound related to his BKA of his left lower extremity and needs a new wheelchair as his is falling apart.  He uses his wheel chair both inside and outside the home.      Review of Systems   Constitutional: Negative for activity change, appetite change, fatigue, unexpected weight gain and unexpected weight loss.   Eyes: Negative for blurred vision and double vision.   Respiratory: Negative for chest tightness and shortness of breath.    Cardiovascular: Negative for chest pain and leg swelling.   Gastrointestinal: Positive for blood in stool and diarrhea. Negative for nausea and vomiting.   Endocrine: Positive for polydipsia, polyphagia and polyuria. Negative for cold intolerance and heat intolerance.   Musculoskeletal: Positive for gait  problem.   Skin: Negative for rash and skin lesions.   Neurological: Negative for dizziness, seizures, syncope, facial asymmetry, weakness, light-headedness, headache, memory problem and confusion.   Psychiatric/Behavioral: Positive for sleep disturbance and depressed mood. The patient is nervous/anxious.         Improved already       Past Medical History:   Diagnosis Date   • JUAN (acute kidney injury) (CMS/[facility]) 7/29/2018   • Arthritis    • Bipolar 1 disorder (CMS/[facility])    • Cellulitis of right anterior lower leg 7/29/2018   • Cirrhosis (CMS/[facility])    • Counseling for insulin pump    • Depression    • Diabetes mellitus (CMS/[facility])    • Encephalopathy, hepatic (CMS/[facility])    • H/O degenerative disc disease    • History of Lissa's gangrene    • Hyperlipemia    • Hypertension    • multiple Skin abscesses    • DUNLAP, bx showed stage IV fibrosis    • Neuropathy    • Peripheral vascular disease (CMS/[facility])    • Thrombophlebitis      Family History   Problem Relation Age of Onset   • Arthritis Mother    • Hypertension Mother    • Kidney disease Mother    • Stroke Mother    • Heart attack Father    • Arthritis Father    • Diabetes Father    • Hyperlipidemia Father    • Hypertension Father    • Mental illness Sister    • Diabetes Brother    • Hypertension Brother    • Obesity Brother      Past Surgical History:   Procedure Laterality Date   • AMPUTATION DIGIT Left 1/30/2017    Procedure: left fourth and fifth transmetatarsal toe amputation ;  Surgeon: Juancho Martinez MD;  Location:  [facility];  Service:    • BELOW KNEE AMPUTATION Left 3/2/2017    Procedure: AMPUTATION BELOW KNEE, SHMUEL;  Surgeon: Juancho Martinez MD;  Location:  [facility];  Service:    • LEG SURGERY     • TESTICLE SURGERY      DEBRIDEMENT FROM GANGRENE     Social History     Socioeconomic History   • Marital status:      Spouse name: Not on file   • Number of children: 1   • Years of education: Not on file   • Highest education level: Not on file   Social Needs    • Financial resource strain: Not on file   • Food insecurity - worry: Not on file   • Food insecurity - inability: Not on file   • Transportation needs - medical: Not on file   • Transportation needs - non-medical: Not on file   Occupational History   • Occupation: Security     Comment: Disabled-Diabetic Retinopathy   Tobacco Use   • Smoking status: Never Smoker   • Smokeless tobacco: Never Used   Substance and Sexual Activity   • Alcohol use: No   • Drug use: No   • Sexual activity: Defer   Other Topics Concern   • Not on file   Social History Narrative    L[facility] in [facility]         Current Outpatient Medications:   •  acetaminophen (TYLENOL) 325 MG tablet, Take 2 tablets by mouth Every 4 (Four) Hours As Needed for mild pain (1-3)., Disp: , Rfl: 0  •  cephalexin (KEFLEX) 500 MG capsule, Take 1 capsule by mouth 2 (Two) Times a Day., Disp: 20 capsule, Rfl: 0  •  doxycycline (DORYX) 100 MG enteric coated tablet, Take 1 tablet by mouth 2 (Two) Times a Day., Disp: 20 tablet, Rfl: 0  •  DULoxetine (CYMBALTA) 30 MG capsule, Take 1 capsule by mouth Daily., Disp: 30 capsule, Rfl: 2  •  DUREZOL 0.05 % ophthalmic emulsion, , Disp: , Rfl:   •  gabapentin (NEURONTIN) 400 MG capsule, Take 3 capsules by mouth 3 (Three) Times a Day., Disp: , Rfl:   •  glucose blood test strip, Check blood sugar 1-2 times daily, Disp: 50 each, Rfl: 12  •  glucose monitor monitoring kit, 1 each Daily., Disp: 1 each, Rfl: 0  •  HYDROcodone-acetaminophen (NORCO)  MG per tablet, Take 1 tablet by mouth 3 (Three) Times a Day As Needed for Moderate Pain  or Severe Pain ., Disp: , Rfl:   •  insulin detemir (LEVEMIR) 100 UNIT/ML injection, Inject 70 Units under the skin into the appropriate area as directed 2 (Two) Times a Day., Disp: 10 mL, Rfl: 12  •  Insulin Lispro (HUMALOG) 100 UNIT/ML solution pen-injector, Inject 30 Units under the skin into the appropriate area as directed 3 (Three) Times a Day With Meals., Disp: 6 pen, Rfl: 3  •  Insulin  "Pen Needle (ADVOCATE INSULIN PEN NEEDLES) 33G X 4 MM misc, 1 application 3 (Three) Times a Day., Disp: 100 each, Rfl: 11  •  Insulin Syringe-Needle U-100 27G X 5/8\" 1 ML misc, 1 syringe 2 (Two) Times a Day., Disp: 100 each, Rfl: 11  •  Lancets (ACCU-CHEK SOFT TOUCH) lancets, 3 times a day, Disp: 100 each, Rfl: 12  •  lisinopril (PRINIVIL,ZESTRIL) 40 MG tablet, Take 1 tablet by mouth Daily., Disp: 30 tablet, Rfl: 2  •  metFORMIN (GLUCOPHAGE) 1000 MG tablet, Take 1 tablet by mouth Daily With Breakfast., Disp: 30 tablet, Rfl: 2  •  probiotic (CULTURELLE) capsule capsule, Take 1 capsule by mouth Daily., Disp: 30 capsule, Rfl: 2    Objective   Vitals:    01/08/19 1141   BP: 154/92   Pulse: 90   Resp: 20   SpO2: 99%   Weight: 110 kg (242 lb)   Height: 190.5 cm (75\")     Body mass index is 30.25 kg/m².  Physical Exam   Constitutional: He is oriented to person, place, and time. He appears well-developed and well-nourished. No distress.   Eyes: Pupils are equal, round, and reactive to light.   Neck: Neck supple. No thyromegaly present.   Cardiovascular: Normal rate and regular rhythm.   Pulmonary/Chest: Effort normal and breath sounds normal.   Neurological: He is alert and oriented to person, place, and time.   Skin: Skin is warm and dry. Capillary refill takes 2 to 3 seconds. Lesion noted. He is not diaphoretic. Nails show no clubbing.        Psychiatric: He has a normal mood and affect. His behavior is normal. Judgment and thought content normal.   Nursing note and vitals reviewed.      Assessment/Plan   Problem List Items Addressed This Visit     Type 2 diabetes mellitus with circulatory disorder and uncontrolled - Primary (Chronic)    Relevant Medications    Insulin Lispro (HUMALOG) 100 UNIT/ML solution pen-injector    insulin detemir (LEVEMIR) 100 UNIT/ML injection    metFORMIN (GLUCOPHAGE) 1000 MG tablet    glucose monitor monitoring kit    glucose blood test strip    Lancets (ACCU-CHEK SOFT TOUCH) lancets    Insulin " "Pen Needle (ADVOCATE INSULIN PEN NEEDLES) 33G X 4 MM misc    Insulin Syringe-Needle U-100 27G X 5/8\" 1 ML misc    Other Relevant Orders    Ambulatory Referral to Podiatry    CBC & Differential    Comprehensive Metabolic Panel    Lipid Panel    CBC Auto Differential    Cellulitis of right anterior lower leg    Relevant Orders    CBC & Differential    Comprehensive Metabolic Panel    CBC Auto Differential    S/P BKA (below knee amputation), left (CMS/[facility])      Other Visit Diagnoses     Diarrhea, unspecified type        Relevant Orders    Ambulatory Referral For Screening Colonoscopy    CBC & Differential    Comprehensive Metabolic Panel    Clostridium Difficile Toxin - Stool, Per Rectum    Stool Culture - Stool, Per Rectum    CBC Auto Differential    Colon cancer screening        Relevant Orders    Ambulatory Referral For Screening Colonoscopy        Kendrick was seen today for diabetes.    Diagnoses and all orders for this visit:    Type 2 diabetes mellitus with diabetic peripheral angiopathy and gangrene, with long-term current use of insulin (CMS/[facility])  -     Ambulatory Referral to Podiatry  -     CBC & Differential  -     Comprehensive Metabolic Panel  -     Lipid Panel  -     CBC Auto Differential        -     Increase Levemir by 2 units twice a day (a total of 4 units a day) every 4 days until fasting blood sugars are less than 200. Follow-up in 2 weeks  Diarrhea, unspecified type  -     Ambulatory Referral For Screening Colonoscopy  -     CBC & Differential  -     Comprehensive Metabolic Panel  -     Clostridium Difficile Toxin - Stool, Per Rectum; Future  -     Stool Culture - Stool, Per Rectum; Future  -     CBC Auto Differential    Colon cancer screening  -     Ambulatory Referral For Screening Colonoscopy    Cellulitis of right anterior lower leg  -     CBC & Differential  -     Comprehensive Metabolic Panel  -     CBC Auto Differential       -  Improving- FU in 2 weeks  S/P BKA (below knee amputation), left " (CMS/[facility])        -    RX for wheelchair will be sent to DME of choice         The patient was counseled regarding diagnostic results, impressions, prognosis, instructions for management, risk factor reductions, education, and importance of treatment compliance.  The patient verbalized understanding of and agreement with the plan of care.    Advised patient to call with any further questions and any new or worsening symptoms.     Return in about 2 weeks (around 1/22/2019).      Keyanna Leone, APRN

## 2019-01-09 ENCOUNTER — TELEPHONE (OUTPATIENT)
Dept: INTERNAL MEDICINE | Facility: CLINIC | Age: 49
End: 2019-01-09

## 2019-01-09 DIAGNOSIS — K74.60 CIRRHOSIS OF LIVER WITHOUT ASCITES, UNSPECIFIED HEPATIC CIRRHOSIS TYPE (HCC): ICD-10-CM

## 2019-01-09 DIAGNOSIS — E78.1 HYPERTRIGLYCERIDEMIA, ESSENTIAL: Primary | ICD-10-CM

## 2019-01-09 RX ORDER — CHLORAL HYDRATE 500 MG
2000 CAPSULE ORAL
Qty: 60 CAPSULE | Refills: 5 | Status: ON HOLD | OUTPATIENT
Start: 2019-01-09 | End: 2019-04-25

## 2019-01-09 NOTE — TELEPHONE ENCOUNTER
Can you let him know his triglycerides were very high- since he has cirrosis I want to avoid starting him on a statin.  I have called in omega 3 fatty acids for him to take. Also, he says he has seen a nutritionist in the past but I really feel like he would benefit from seeing one again, will you ask him if he would be willing to see one? thanks

## 2019-01-11 ENCOUNTER — APPOINTMENT (OUTPATIENT)
Dept: PHYSICAL THERAPY | Facility: HOSPITAL | Age: 49
End: 2019-01-11

## 2019-01-14 ENCOUNTER — TELEPHONE (OUTPATIENT)
Dept: INTERNAL MEDICINE | Facility: CLINIC | Age: 49
End: 2019-01-14

## 2019-01-14 DIAGNOSIS — Z79.4 TYPE 2 DIABETES MELLITUS WITH DIABETIC PERIPHERAL ANGIOPATHY AND GANGRENE, WITH LONG-TERM CURRENT USE OF INSULIN (HCC): Primary | Chronic | ICD-10-CM

## 2019-01-14 DIAGNOSIS — E11.52 TYPE 2 DIABETES MELLITUS WITH DIABETIC PERIPHERAL ANGIOPATHY AND GANGRENE, WITH LONG-TERM CURRENT USE OF INSULIN (HCC): Primary | Chronic | ICD-10-CM

## 2019-01-14 NOTE — TELEPHONE ENCOUNTER
Can you let him know his insurance is no longer going to cover levemir- I am going to switch long acting insulins to tresiba. This is actually a good thing because it works a little better.  Dose will stay the same.  Thanks

## 2019-01-28 DIAGNOSIS — Z12.11 SCREENING FOR COLON CANCER: Primary | ICD-10-CM

## 2019-01-28 RX ORDER — SODIUM, POTASSIUM,MAG SULFATES 17.5-3.13G
1 SOLUTION, RECONSTITUTED, ORAL ORAL TAKE AS DIRECTED
Qty: 2 BOTTLE | Refills: 0 | Status: SHIPPED | OUTPATIENT
Start: 2019-01-28 | End: 2019-05-03 | Stop reason: HOSPADM

## 2019-01-31 ENCOUNTER — TELEPHONE (OUTPATIENT)
Dept: CARDIAC SURGERY | Facility: CLINIC | Age: 49
End: 2019-01-31

## 2019-01-31 NOTE — TELEPHONE ENCOUNTER
Spoke with patient's wife to R/S cancelled appt of 1/29/19.  The patient's wife is aware of the new date and time, 2/12/19 at 10:15.

## 2019-02-13 ENCOUNTER — TELEPHONE (OUTPATIENT)
Dept: INTERNAL MEDICINE | Facility: CLINIC | Age: 49
End: 2019-02-13

## 2019-02-13 NOTE — TELEPHONE ENCOUNTER
He never brought in his stool sample to R/O C.Diff- if it is C.Diff I cannot safely give him antidiarrheals- make sure he is aware he has an appt with GI tomorrow where he can discuss further the cause of his chronic diarrhea

## 2019-02-13 NOTE — TELEPHONE ENCOUNTER
Pt needs something for diarrhea. Pt has been having it everyday. Pt didn't make it for his apt today because of the diarrhea.

## 2019-02-15 ENCOUNTER — TELEPHONE (OUTPATIENT)
Dept: CARDIAC SURGERY | Facility: CLINIC | Age: 49
End: 2019-02-15

## 2019-02-15 NOTE — TELEPHONE ENCOUNTER
Spoke with patient's wife and rescheduled the NS appt for 3/5/19.  The patient and wife are both aware of new date and time.  Will mail reminder.

## 2019-03-05 ENCOUNTER — OFFICE VISIT (OUTPATIENT)
Dept: CARDIAC SURGERY | Facility: CLINIC | Age: 49
End: 2019-03-05

## 2019-03-05 VITALS
TEMPERATURE: 98.2 F | BODY MASS INDEX: 30.15 KG/M2 | SYSTOLIC BLOOD PRESSURE: 140 MMHG | DIASTOLIC BLOOD PRESSURE: 80 MMHG | WEIGHT: 242.51 LBS | HEIGHT: 75 IN | HEART RATE: 97 BPM | OXYGEN SATURATION: 97 %

## 2019-03-05 DIAGNOSIS — L97.919 DIABETIC ULCER OF RIGHT LOWER LEG (HCC): Primary | ICD-10-CM

## 2019-03-05 DIAGNOSIS — E11.622 DIABETIC ULCER OF RIGHT LOWER LEG (HCC): Primary | ICD-10-CM

## 2019-03-05 PROCEDURE — 99213 OFFICE O/P EST LOW 20 MIN: CPT | Performed by: PHYSICIAN ASSISTANT

## 2019-03-05 NOTE — PROGRESS NOTES
03/05/2019  Patient Information  Kendrick Crystal                                                                                          [address] [place] [address] [address] [place] [address]   1970  'PCP/Referring Physician'  VasuKeyanna, APRN  [phone]  No ref. provider found    Chief Complaint   Patient presents with   • Follow-up     WOUND CHECK F/U. PT RIGHT LEG IS SWOLLEN, RED, AND VERY SORE.   • Wound Check       History of Present Illness:  Patient is a 48-year-old  male with history of hypertension, hyperlipidemia, type 2 diabetes mellitus, left below the knee amputation (3/2/17) and cellulitis/diabetic ulcer of the right anterior lower extremity who presents to office today for continued evaluation of right anterior shin ulceration.  The patient was last seen on 10/31/2019 and complains of occasional pain in the right lower extremity, but denies any falls, purulent discharge, nausea, vomiting, fever, chills or night sweats during the interval.  He states that he has difficulty getting around secondary to muscle atrophy and his prosthetic left lower extremity being too loose.  He is scheduled to receive a new left lower extremity prosthetic tomorrow.  The patient complains of a new area on his right lower extremity beneath the previous ulcer, which is healed well, that has been present x1 month, and is slowly healing.  The patient is otherwise doing well.    Patient Active Problem List   Diagnosis   • DUNLAP Cirrhosis   • Essential hypertension   • Non-compliance   • Hyperlipemia   • Hypertriglyceridemia, essential   • Wound infection of left leg   • Type 2 diabetes mellitus with circulatory disorder and uncontrolled   • Dysthymia   • History of MRSA infection   • Diabetic ulcer of right lower leg (CMS/HCC)   • Cellulitis of right anterior lower leg   • JUAN (acute kidney injury) (CMS/[facility])   • S/P BKA (below knee amputation), left (CMS/[facility])   • Peripheral vascular disease (CMS/HCC)     Past Medical  History:   Diagnosis Date   • JUAN (acute kidney injury) (CMS/[facility]) 7/29/2018   • Arthritis    • Bipolar 1 disorder (CMS/[facility])    • Cellulitis of right anterior lower leg 7/29/2018   • Cirrhosis (CMS/[facility]CC)    • Counseling for insulin pump    • Depression    • Diabetes mellitus (CMS/[facility]CC)    • Encephalopathy, hepatic (CMS/HCC)    • H/O degenerative disc disease    • History of Lissa's gangrene    • Hyperlipemia    • Hypertension    • multiple Skin abscesses    • DUNLAP, bx showed stage IV fibrosis    • Neuropathy    • Peripheral vascular disease (CMS/HCC)    • Thrombophlebitis      Past Surgical History:   Procedure Laterality Date   • AMPUTATION DIGIT Left 1/30/2017    Procedure: left fourth and fifth transmetatarsal toe amputation ;  Surgeon: Juancho Martinez MD;  Location:  [facility];  Service:    • BELOW KNEE AMPUTATION Left 3/2/2017    Procedure: AMPUTATION BELOW KNEE, SHMUEL;  Surgeon: Juancho Martinez MD;  Location:  [facility];  Service:    • LEG SURGERY     • TESTICLE SURGERY      DEBRIDEMENT FROM GANGRENE       Current Outpatient Medications:   •  acetaminophen (TYLENOL) 325 MG tablet, Take 2 tablets by mouth Every 4 (Four) Hours As Needed for mild pain (1-3)., Disp: , Rfl: 0  •  cephalexin (KEFLEX) 500 MG capsule, Take 1 capsule by mouth 2 (Two) Times a Day., Disp: 20 capsule, Rfl: 0  •  doxycycline (DORYX) 100 MG enteric coated tablet, Take 1 tablet by mouth 2 (Two) Times a Day., Disp: 20 tablet, Rfl: 0  •  DULoxetine (CYMBALTA) 30 MG capsule, Take 1 capsule by mouth Daily., Disp: 30 capsule, Rfl: 2  •  DUREZOL 0.05 % ophthalmic emulsion, , Disp: , Rfl:   •  gabapentin (NEURONTIN) 400 MG capsule, Take 3 capsules by mouth 3 (Three) Times a Day., Disp: , Rfl:   •  glucose blood test strip, Check blood sugar 1-2 times daily, Disp: 50 each, Rfl: 12  •  glucose monitor monitoring kit, 1 each Daily., Disp: 1 each, Rfl: 0  •  HYDROcodone-acetaminophen (NORCO)  MG per tablet, Take 1 tablet by mouth 3 (Three) Times a Day  "As Needed for Moderate Pain  or Severe Pain ., Disp: , Rfl:   •  Insulin Degludec 200 UNIT/ML solution pen-injector, Inject 70 Units under the skin into the appropriate area as directed 2 (Two) Times a Day., Disp: 12 pen, Rfl: 5  •  Insulin Lispro (HUMALOG) 100 UNIT/ML solution pen-injector, Inject 30 Units under the skin into the appropriate area as directed 3 (Three) Times a Day With Meals., Disp: 6 pen, Rfl: 3  •  Insulin Pen Needle (ADVOCATE INSULIN PEN NEEDLES) 33G X 4 MM misc, 1 application 3 (Three) Times a Day., Disp: 100 each, Rfl: 11  •  Insulin Syringe-Needle U-100 27G X 5/8\" 1 ML misc, 1 syringe 2 (Two) Times a Day., Disp: 100 each, Rfl: 11  •  Lancets (ACCU-CHEK SOFT TOUCH) lancets, 3 times a day, Disp: 100 each, Rfl: 12  •  lisinopril (PRINIVIL,ZESTRIL) 40 MG tablet, Take 1 tablet by mouth Daily., Disp: 30 tablet, Rfl: 2  •  metFORMIN (GLUCOPHAGE) 1000 MG tablet, Take 1 tablet by mouth Daily With Breakfast., Disp: 30 tablet, Rfl: 2  •  Omega-3 Fatty Acids (FISH OIL) 1000 MG capsule capsule, Take 2 capsules by mouth Daily With Breakfast., Disp: 60 capsule, Rfl: 5  •  probiotic (CULTURELLE) capsule capsule, Take 1 capsule by mouth Daily., Disp: 30 capsule, Rfl: 2  •  sodium-potassium-magnesium sulfates (SUPREP BOWEL PREP KIT) 17.5-3.13-1.6 GM/177ML solution oral solution, Take 1 bottle by mouth Take As Directed. Follow instructions that were mailed to your home. If you didn't receive these call [phone]., Disp: 2 bottle, Rfl: 0  Allergies   Allergen Reactions   • No Known Drug Allergy      Social History     Socioeconomic History   • Marital status:      Spouse name: Not on file   • Number of children: 1   • Years of education: Not on file   • Highest education level: Not on file   Social Needs   • Financial resource strain: Not on file   • Food insecurity - worry: Not on file   • Food insecurity - inability: Not on file   • Transportation needs - medical: Not on file   • Transportation " needs - non-medical: Not on file   Occupational History   • Occupation: Security     Comment: Disabled-Diabetic Retinopathy   Tobacco Use   • Smoking status: Never Smoker   • Smokeless tobacco: Never Used   Substance and Sexual Activity   • Alcohol use: No   • Drug use: No   • Sexual activity: Defer   Other Topics Concern   • Not on file   Social History Narrative    L[facility] in [facility]     Family History   Problem Relation Age of Onset   • Arthritis Mother    • Hypertension Mother    • Kidney disease Mother    • Stroke Mother    • Heart attack Father    • Arthritis Father    • Diabetes Father    • Hyperlipidemia Father    • Hypertension Father    • Mental illness Sister    • Diabetes Brother    • Hypertension Brother    • Obesity Brother      Review of Systems   Constitution: Negative for chills, diaphoresis, fever, weakness, malaise/fatigue, night sweats, weight gain and weight loss.   HENT: Negative for congestion, ear pain, hearing loss, nosebleeds and sore throat.    Eyes: Positive for blurred vision. Negative for double vision.   Cardiovascular: Positive for leg swelling. Negative for chest pain, claudication, dyspnea on exertion, near-syncope, palpitations and syncope.   Respiratory: Positive for cough. Negative for hemoptysis, shortness of breath and wheezing.    Hematologic/Lymphatic: Does not bruise/bleed easily.   Skin: Positive for poor wound healing. Negative for itching and rash.   Musculoskeletal: Negative for arthritis, back pain, falls, joint pain, joint swelling, muscle cramps, muscle weakness and neck pain.   Gastrointestinal: Positive for constipation, hematemesis and nausea (DRY HEAVING). Negative for abdominal pain, diarrhea, dysphagia and vomiting.   Genitourinary: Positive for hematuria. Negative for frequency, hesitancy, incomplete emptying and urgency.   Neurological: Negative for dizziness, headaches, light-headedness, loss of balance, numbness, seizures and tremors.  "  Psychiatric/Behavioral: Negative for depression and suicidal ideas. The patient is not nervous/anxious.    Allergic/Immunologic: Negative for environmental allergies and HIV exposure.     Vitals:    03/05/19 1354   BP: 140/80   BP Location: Right arm   Patient Position: Sitting   Pulse: 97   Temp: 98.2 °F (36.8 °C)   SpO2: 97%   Weight: 110 kg (242 lb 8.1 oz)   Height: 190.5 cm (75\")      Physical Exam:  Gen - NAD, pleasant, cooperative  CV - Regular rate and rhythm, no murmur gallop or rub  Pulm - Lungs clear to auscultation without wheeze or rhonchi   GI - Soft, normoactive bowel sounds, non-tender  Ext - Without edema, left BKA stump well-healed  Skin - Previous ulcer healed well; new wound measuring approximately 3 x 2.5cm is present distal to former ulcer. New wound appears to be more of an abrasion, which is slowly healing.  Neuro - CN II - XII grossly intact, tongue midline, voice normal    Assessment/Plan:  Patient is a 48-year-old  male with history of hypertension, hyperlipidemia, type 2 diabetes mellitus, left below the knee amputation (3/2/17) and cellulitis/diabetic ulcer of the right anterior lower extremity who presents to office today for continued evaluation of right anterior shin ulceration.  The patient has been doing well since he was last seen in office.  He is no longer receiving wound care via home health, but our office will schedule for the patient to be evaluated on a regular basis by a local wound care clinic near the patient's house prior to him leaving office today.  I would like for the patient to follow-up in 1 year for continued evaluation.  If he has any questions, concerns or acutely worsening symptoms during the interval, he may call our office or present to the nearest emergency department immediately.    Patient Active Problem List   Diagnosis   • DUNLAP Cirrhosis   • Essential hypertension   • Non-compliance   • Hyperlipemia   • Hypertriglyceridemia, essential   • Wound " infection of left leg   • Type 2 diabetes mellitus with circulatory disorder and uncontrolled   • Dysthymia   • History of MRSA infection   • Diabetic ulcer of right lower leg (CMS/[facility])   • Cellulitis of right anterior lower leg   • JUAN (acute kidney injury) (CMS/HCC)   • S/P BKA (below knee amputation), left (CMS/[facility]CC)   • Peripheral vascular disease (CMS/HCC)

## 2019-04-16 ENCOUNTER — TELEPHONE (OUTPATIENT)
Dept: CARDIAC SURGERY | Facility: CLINIC | Age: 49
End: 2019-04-16

## 2019-04-16 NOTE — TELEPHONE ENCOUNTER
Mr. Crystal is requesting a order for physical therapy to strengthen his right leg for left leg prosthesis. Is this ok to order?     Ok per Dr. Martinez verbally 4/16/19   Order faxed   [place]

## 2019-04-24 ENCOUNTER — APPOINTMENT (OUTPATIENT)
Dept: CT IMAGING | Facility: HOSPITAL | Age: 49
End: 2019-04-24

## 2019-04-24 ENCOUNTER — HOSPITAL ENCOUNTER (INPATIENT)
Facility: HOSPITAL | Age: 49
LOS: 8 days | Discharge: NURSING, REHAB OR LONG-TERM CARE | End: 2019-05-03
Attending: EMERGENCY MEDICINE | Admitting: SURGERY

## 2019-04-24 DIAGNOSIS — Z79.4 TYPE 2 DIABETES MELLITUS WITH HYPERGLYCEMIA, WITH LONG-TERM CURRENT USE OF INSULIN (HCC): ICD-10-CM

## 2019-04-24 DIAGNOSIS — Z86.14 HISTORY OF MRSA INFECTION: ICD-10-CM

## 2019-04-24 DIAGNOSIS — Z74.09 IMPAIRED MOBILITY AND ADLS: ICD-10-CM

## 2019-04-24 DIAGNOSIS — E11.52 TYPE 2 DIABETES MELLITUS WITH DIABETIC PERIPHERAL ANGIOPATHY AND GANGRENE, WITH LONG-TERM CURRENT USE OF INSULIN (HCC): Chronic | ICD-10-CM

## 2019-04-24 DIAGNOSIS — L03.311 CELLULITIS OF ABDOMINAL WALL: Primary | ICD-10-CM

## 2019-04-24 DIAGNOSIS — E11.65 TYPE 2 DIABETES MELLITUS WITH HYPERGLYCEMIA, WITH LONG-TERM CURRENT USE OF INSULIN (HCC): ICD-10-CM

## 2019-04-24 DIAGNOSIS — Z79.4 TYPE 2 DIABETES MELLITUS WITH DIABETIC PERIPHERAL ANGIOPATHY AND GANGRENE, WITH LONG-TERM CURRENT USE OF INSULIN (HCC): Chronic | ICD-10-CM

## 2019-04-24 DIAGNOSIS — Z86.79 HISTORY OF HYPERTENSION: ICD-10-CM

## 2019-04-24 DIAGNOSIS — D72.829 LEUKOCYTOSIS, UNSPECIFIED TYPE: ICD-10-CM

## 2019-04-24 DIAGNOSIS — L02.211 ABDOMINAL WALL ABSCESS: ICD-10-CM

## 2019-04-24 DIAGNOSIS — Z74.09 IMPAIRED FUNCTIONAL MOBILITY, BALANCE, GAIT, AND ENDURANCE: ICD-10-CM

## 2019-04-24 DIAGNOSIS — I73.9 PERIPHERAL VASCULAR DISEASE (HCC): ICD-10-CM

## 2019-04-24 DIAGNOSIS — Z78.9 IMPAIRED MOBILITY AND ADLS: ICD-10-CM

## 2019-04-24 LAB
ALBUMIN SERPL-MCNC: 3.3 G/DL (ref 3.5–5.2)
ALBUMIN/GLOB SERPL: 0.8 G/DL
ALP SERPL-CCNC: 140 U/L (ref 39–117)
ALT SERPL W P-5'-P-CCNC: 15 U/L (ref 1–41)
ANION GAP SERPL CALCULATED.3IONS-SCNC: 14 MMOL/L
AST SERPL-CCNC: 13 U/L (ref 1–40)
BASOPHILS # BLD AUTO: 0.04 10*3/MM3 (ref 0–0.2)
BASOPHILS NFR BLD AUTO: 0.2 % (ref 0–1.5)
BILIRUB SERPL-MCNC: 0.4 MG/DL (ref 0.2–1.2)
BUN BLD-MCNC: 15 MG/DL (ref 6–20)
BUN/CREAT SERPL: 20 (ref 7–25)
CALCIUM SPEC-SCNC: 8.4 MG/DL (ref 8.6–10.5)
CHLORIDE SERPL-SCNC: 90 MMOL/L (ref 98–107)
CO2 SERPL-SCNC: 24 MMOL/L (ref 22–29)
CREAT BLD-MCNC: 0.75 MG/DL (ref 0.76–1.27)
D-LACTATE SERPL-SCNC: 1.8 MMOL/L (ref 0.5–2)
DEPRECATED RDW RBC AUTO: 41.3 FL (ref 37–54)
EOSINOPHIL # BLD AUTO: 0.11 10*3/MM3 (ref 0–0.4)
EOSINOPHIL NFR BLD AUTO: 0.7 % (ref 0.3–6.2)
ERYTHROCYTE [DISTWIDTH] IN BLOOD BY AUTOMATED COUNT: 13.2 % (ref 12.3–15.4)
GFR SERPL CREATININE-BSD FRML MDRD: 110 ML/MIN/1.73
GLOBULIN UR ELPH-MCNC: 4.1 GM/DL
GLUCOSE BLD-MCNC: 551 MG/DL (ref 65–99)
HCT VFR BLD AUTO: 40.2 % (ref 37.5–51)
HGB BLD-MCNC: 13.5 G/DL (ref 13–17.7)
IMM GRANULOCYTES # BLD AUTO: 0.06 10*3/MM3 (ref 0–0.05)
IMM GRANULOCYTES NFR BLD AUTO: 0.4 % (ref 0–0.5)
LYMPHOCYTES # BLD AUTO: 2.6 10*3/MM3 (ref 0.7–3.1)
LYMPHOCYTES NFR BLD AUTO: 16 % (ref 19.6–45.3)
MCH RBC QN AUTO: 28.7 PG (ref 26.6–33)
MCHC RBC AUTO-ENTMCNC: 33.6 G/DL (ref 31.5–35.7)
MCV RBC AUTO: 85.4 FL (ref 79–97)
MONOCYTES # BLD AUTO: 1.04 10*3/MM3 (ref 0.1–0.9)
MONOCYTES NFR BLD AUTO: 6.4 % (ref 5–12)
NEUTROPHILS # BLD AUTO: 12.44 10*3/MM3 (ref 1.7–7)
NEUTROPHILS NFR BLD AUTO: 76.7 % (ref 42.7–76)
PLATELET # BLD AUTO: 219 10*3/MM3 (ref 140–450)
PMV BLD AUTO: 12.3 FL (ref 6–12)
POTASSIUM BLD-SCNC: 4.2 MMOL/L (ref 3.5–5.2)
PROT SERPL-MCNC: 7.4 G/DL (ref 6–8.5)
RBC # BLD AUTO: 4.71 10*6/MM3 (ref 4.14–5.8)
SODIUM BLD-SCNC: 128 MMOL/L (ref 136–145)
WBC NRBC COR # BLD: 16.23 10*3/MM3 (ref 3.4–10.8)

## 2019-04-24 PROCEDURE — 87186 SC STD MICRODIL/AGAR DIL: CPT | Performed by: PHYSICIAN ASSISTANT

## 2019-04-24 PROCEDURE — 25010000002 VANCOMYCIN 10 G RECONSTITUTED SOLUTION: Performed by: PHYSICIAN ASSISTANT

## 2019-04-24 PROCEDURE — 25010000002 PIPERACILLIN SOD-TAZOBACTAM PER 1 G: Performed by: PHYSICIAN ASSISTANT

## 2019-04-24 PROCEDURE — 87076 CULTURE ANAEROBE IDENT EACH: CPT | Performed by: PHYSICIAN ASSISTANT

## 2019-04-24 PROCEDURE — 85025 COMPLETE CBC W/AUTO DIFF WBC: CPT | Performed by: PHYSICIAN ASSISTANT

## 2019-04-24 PROCEDURE — 83605 ASSAY OF LACTIC ACID: CPT | Performed by: PHYSICIAN ASSISTANT

## 2019-04-24 PROCEDURE — 87077 CULTURE AEROBIC IDENTIFY: CPT | Performed by: PHYSICIAN ASSISTANT

## 2019-04-24 PROCEDURE — 87070 CULTURE OTHR SPECIMN AEROBIC: CPT | Performed by: PHYSICIAN ASSISTANT

## 2019-04-24 PROCEDURE — 25010000002 IOPAMIDOL 61 % SOLUTION: Performed by: EMERGENCY MEDICINE

## 2019-04-24 PROCEDURE — 87040 BLOOD CULTURE FOR BACTERIA: CPT | Performed by: PHYSICIAN ASSISTANT

## 2019-04-24 PROCEDURE — 99285 EMERGENCY DEPT VISIT HI MDM: CPT

## 2019-04-24 PROCEDURE — 82010 KETONE BODYS QUAN: CPT | Performed by: PHYSICIAN ASSISTANT

## 2019-04-24 PROCEDURE — 87205 SMEAR GRAM STAIN: CPT | Performed by: PHYSICIAN ASSISTANT

## 2019-04-24 PROCEDURE — 74177 CT ABD & PELVIS W/CONTRAST: CPT

## 2019-04-24 PROCEDURE — 80053 COMPREHEN METABOLIC PANEL: CPT | Performed by: PHYSICIAN ASSISTANT

## 2019-04-24 RX ADMIN — IOPAMIDOL 95 ML: 612 INJECTION, SOLUTION INTRAVENOUS at 23:14

## 2019-04-24 RX ADMIN — VANCOMYCIN HYDROCHLORIDE 2250 MG: 10 INJECTION, POWDER, LYOPHILIZED, FOR SOLUTION INTRAVENOUS at 22:33

## 2019-04-24 RX ADMIN — TAZOBACTAM SODIUM AND PIPERACILLIN SODIUM 3.38 G: 375; 3 INJECTION, SOLUTION INTRAVENOUS at 22:33

## 2019-04-25 PROBLEM — E11.65 TYPE 2 DIABETES MELLITUS WITH HYPERGLYCEMIA (HCC): Status: ACTIVE | Noted: 2019-04-25

## 2019-04-25 PROBLEM — L02.211 ABDOMINAL WALL ABSCESS: Status: ACTIVE | Noted: 2019-04-25

## 2019-04-25 PROBLEM — L03.90 CELLULITIS: Status: ACTIVE | Noted: 2019-04-25

## 2019-04-25 PROBLEM — IMO0002 DM (DIABETES MELLITUS) TYPE II UNCONTROLLED, PERIPH VASCULAR DISORDER: Status: ACTIVE | Noted: 2019-04-25

## 2019-04-25 LAB
ANION GAP SERPL CALCULATED.3IONS-SCNC: 14 MMOL/L
APTT PPP: 38.9 SECONDS (ref 24–37)
B-OH-BUTYR SERPL-SCNC: 0.16 MMOL/L (ref 0.02–0.27)
BASOPHILS # BLD AUTO: 0.04 10*3/MM3 (ref 0–0.2)
BASOPHILS NFR BLD AUTO: 0.2 % (ref 0–1.5)
BUN BLD-MCNC: 12 MG/DL (ref 6–20)
BUN/CREAT SERPL: 17.9 (ref 7–25)
CALCIUM SPEC-SCNC: 8.5 MG/DL (ref 8.6–10.5)
CHLORIDE SERPL-SCNC: 94 MMOL/L (ref 98–107)
CK SERPL-CCNC: 27 U/L (ref 20–200)
CO2 SERPL-SCNC: 24 MMOL/L (ref 22–29)
CREAT BLD-MCNC: 0.67 MG/DL (ref 0.76–1.27)
DEPRECATED RDW RBC AUTO: 41.3 FL (ref 37–54)
EOSINOPHIL # BLD AUTO: 0.13 10*3/MM3 (ref 0–0.4)
EOSINOPHIL NFR BLD AUTO: 0.7 % (ref 0.3–6.2)
ERYTHROCYTE [DISTWIDTH] IN BLOOD BY AUTOMATED COUNT: 13.3 % (ref 12.3–15.4)
GFR SERPL CREATININE-BSD FRML MDRD: 126 ML/MIN/1.73
GLUCOSE BLD-MCNC: 344 MG/DL (ref 65–99)
GLUCOSE BLDC GLUCOMTR-MCNC: 225 MG/DL (ref 70–130)
GLUCOSE BLDC GLUCOMTR-MCNC: 226 MG/DL (ref 70–130)
GLUCOSE BLDC GLUCOMTR-MCNC: 245 MG/DL (ref 70–130)
GLUCOSE BLDC GLUCOMTR-MCNC: 252 MG/DL (ref 70–130)
GLUCOSE BLDC GLUCOMTR-MCNC: 256 MG/DL (ref 70–130)
GLUCOSE BLDC GLUCOMTR-MCNC: 281 MG/DL (ref 70–130)
GLUCOSE BLDC GLUCOMTR-MCNC: 308 MG/DL (ref 70–130)
GLUCOSE BLDC GLUCOMTR-MCNC: 373 MG/DL (ref 70–130)
HBA1C MFR BLD: 14.7 % (ref 4.8–5.6)
HCT VFR BLD AUTO: 37.8 % (ref 37.5–51)
HGB BLD-MCNC: 12.6 G/DL (ref 13–17.7)
IMM GRANULOCYTES # BLD AUTO: 0.06 10*3/MM3 (ref 0–0.05)
IMM GRANULOCYTES NFR BLD AUTO: 0.3 % (ref 0–0.5)
INR PPP: 1.13 (ref 0.85–1.16)
LYMPHOCYTES # BLD AUTO: 1.68 10*3/MM3 (ref 0.7–3.1)
LYMPHOCYTES NFR BLD AUTO: 9.5 % (ref 19.6–45.3)
MCH RBC QN AUTO: 28.4 PG (ref 26.6–33)
MCHC RBC AUTO-ENTMCNC: 33.3 G/DL (ref 31.5–35.7)
MCV RBC AUTO: 85.1 FL (ref 79–97)
MONOCYTES # BLD AUTO: 1.83 10*3/MM3 (ref 0.1–0.9)
MONOCYTES NFR BLD AUTO: 10.4 % (ref 5–12)
NEUTROPHILS # BLD AUTO: 13.94 10*3/MM3 (ref 1.7–7)
NEUTROPHILS NFR BLD AUTO: 79.2 % (ref 42.7–76)
PLAT MORPH BLD: NORMAL
PLATELET # BLD AUTO: 217 10*3/MM3 (ref 140–450)
PMV BLD AUTO: 13 FL (ref 6–12)
POTASSIUM BLD-SCNC: 3.7 MMOL/L (ref 3.5–5.2)
PROTHROMBIN TIME: 14 SECONDS (ref 11.2–14.3)
RBC # BLD AUTO: 4.44 10*6/MM3 (ref 4.14–5.8)
RBC MORPH BLD: NORMAL
SODIUM BLD-SCNC: 132 MMOL/L (ref 136–145)
WBC MORPH BLD: NORMAL
WBC NRBC COR # BLD: 17.62 10*3/MM3 (ref 3.4–10.8)

## 2019-04-25 PROCEDURE — 99223 1ST HOSP IP/OBS HIGH 75: CPT | Performed by: INTERNAL MEDICINE

## 2019-04-25 PROCEDURE — 82962 GLUCOSE BLOOD TEST: CPT

## 2019-04-25 PROCEDURE — 25010000002 PIPERACILLIN SOD-TAZOBACTAM PER 1 G: Performed by: NURSE PRACTITIONER

## 2019-04-25 PROCEDURE — 85730 THROMBOPLASTIN TIME PARTIAL: CPT | Performed by: INTERNAL MEDICINE

## 2019-04-25 PROCEDURE — 25010000002 ENOXAPARIN PER 10 MG: Performed by: NURSE PRACTITIONER

## 2019-04-25 PROCEDURE — 80048 BASIC METABOLIC PNL TOTAL CA: CPT | Performed by: NURSE PRACTITIONER

## 2019-04-25 PROCEDURE — 63710000001 INSULIN DETEMIR PER 5 UNITS: Performed by: INTERNAL MEDICINE

## 2019-04-25 PROCEDURE — 63710000001 INSULIN LISPRO (HUMAN) PER 5 UNITS: Performed by: NURSE PRACTITIONER

## 2019-04-25 PROCEDURE — 85025 COMPLETE CBC W/AUTO DIFF WBC: CPT | Performed by: NURSE PRACTITIONER

## 2019-04-25 PROCEDURE — 63710000001 INSULIN REGULAR HUMAN PER 5 UNITS: Performed by: PHYSICIAN ASSISTANT

## 2019-04-25 PROCEDURE — 83036 HEMOGLOBIN GLYCOSYLATED A1C: CPT | Performed by: NURSE PRACTITIONER

## 2019-04-25 PROCEDURE — G0108 DIAB MANAGE TRN  PER INDIV: HCPCS | Performed by: REGISTERED NURSE

## 2019-04-25 PROCEDURE — 85007 BL SMEAR W/DIFF WBC COUNT: CPT | Performed by: NURSE PRACTITIONER

## 2019-04-25 PROCEDURE — 85610 PROTHROMBIN TIME: CPT | Performed by: INTERNAL MEDICINE

## 2019-04-25 PROCEDURE — 82550 ASSAY OF CK (CPK): CPT | Performed by: INTERNAL MEDICINE

## 2019-04-25 PROCEDURE — 25010000002 DAPTOMYCIN PER 1 MG: Performed by: INTERNAL MEDICINE

## 2019-04-25 RX ORDER — SODIUM CHLORIDE 9 MG/ML
50 INJECTION, SOLUTION INTRAVENOUS CONTINUOUS
Status: ACTIVE | OUTPATIENT
Start: 2019-04-25 | End: 2019-04-26

## 2019-04-25 RX ORDER — SODIUM CHLORIDE 0.9 % (FLUSH) 0.9 %
3 SYRINGE (ML) INJECTION EVERY 12 HOURS SCHEDULED
Status: DISCONTINUED | OUTPATIENT
Start: 2019-04-25 | End: 2019-05-03 | Stop reason: HOSPADM

## 2019-04-25 RX ORDER — NICOTINE POLACRILEX 4 MG
15 LOZENGE BUCCAL
Status: DISCONTINUED | OUTPATIENT
Start: 2019-04-25 | End: 2019-05-03 | Stop reason: HOSPADM

## 2019-04-25 RX ORDER — HYDROCODONE BITARTRATE AND ACETAMINOPHEN 10; 325 MG/1; MG/1
1 TABLET ORAL 3 TIMES DAILY PRN
Status: DISCONTINUED | OUTPATIENT
Start: 2019-04-25 | End: 2019-05-03 | Stop reason: HOSPADM

## 2019-04-25 RX ORDER — DEXTROSE MONOHYDRATE 25 G/50ML
25 INJECTION, SOLUTION INTRAVENOUS
Status: DISCONTINUED | OUTPATIENT
Start: 2019-04-25 | End: 2019-05-03 | Stop reason: HOSPADM

## 2019-04-25 RX ORDER — SODIUM CHLORIDE 9 MG/ML
125 INJECTION, SOLUTION INTRAVENOUS CONTINUOUS
Status: DISCONTINUED | OUTPATIENT
Start: 2019-04-25 | End: 2019-04-26

## 2019-04-25 RX ORDER — MAGNESIUM SULFATE HEPTAHYDRATE 40 MG/ML
4 INJECTION, SOLUTION INTRAVENOUS AS NEEDED
Status: DISCONTINUED | OUTPATIENT
Start: 2019-04-25 | End: 2019-05-03 | Stop reason: HOSPADM

## 2019-04-25 RX ORDER — SODIUM CHLORIDE 0.9 % (FLUSH) 0.9 %
3-10 SYRINGE (ML) INJECTION AS NEEDED
Status: DISCONTINUED | OUTPATIENT
Start: 2019-04-25 | End: 2019-05-03 | Stop reason: HOSPADM

## 2019-04-25 RX ORDER — GABAPENTIN 400 MG/1
1200 CAPSULE ORAL 3 TIMES DAILY
Status: DISCONTINUED | OUTPATIENT
Start: 2019-04-25 | End: 2019-05-03 | Stop reason: HOSPADM

## 2019-04-25 RX ORDER — MAGNESIUM SULFATE 1 G/100ML
1 INJECTION INTRAVENOUS AS NEEDED
Status: DISCONTINUED | OUTPATIENT
Start: 2019-04-25 | End: 2019-05-03 | Stop reason: HOSPADM

## 2019-04-25 RX ORDER — LISINOPRIL 40 MG/1
40 TABLET ORAL DAILY
Status: DISCONTINUED | OUTPATIENT
Start: 2019-04-25 | End: 2019-05-03 | Stop reason: HOSPADM

## 2019-04-25 RX ORDER — ACETAMINOPHEN 325 MG/1
650 TABLET ORAL EVERY 4 HOURS PRN
Status: DISCONTINUED | OUTPATIENT
Start: 2019-04-25 | End: 2019-05-03 | Stop reason: HOSPADM

## 2019-04-25 RX ORDER — DULOXETIN HYDROCHLORIDE 30 MG/1
30 CAPSULE, DELAYED RELEASE ORAL DAILY
Status: DISCONTINUED | OUTPATIENT
Start: 2019-04-25 | End: 2019-05-03 | Stop reason: HOSPADM

## 2019-04-25 RX ORDER — MAGNESIUM SULFATE HEPTAHYDRATE 40 MG/ML
2 INJECTION, SOLUTION INTRAVENOUS AS NEEDED
Status: DISCONTINUED | OUTPATIENT
Start: 2019-04-25 | End: 2019-05-03 | Stop reason: HOSPADM

## 2019-04-25 RX ORDER — L.ACID,PARA/B.BIFIDUM/S.THERM 8B CELL
1 CAPSULE ORAL DAILY
Status: DISCONTINUED | OUTPATIENT
Start: 2019-04-25 | End: 2019-05-03 | Stop reason: HOSPADM

## 2019-04-25 RX ORDER — ACETAMINOPHEN 325 MG/1
650 TABLET ORAL EVERY 6 HOURS PRN
Status: DISCONTINUED | OUTPATIENT
Start: 2019-04-25 | End: 2019-04-25 | Stop reason: SDUPTHER

## 2019-04-25 RX ADMIN — GABAPENTIN 1200 MG: 400 CAPSULE ORAL at 17:45

## 2019-04-25 RX ADMIN — ENOXAPARIN SODIUM 40 MG: 40 INJECTION SUBCUTANEOUS at 05:34

## 2019-04-25 RX ADMIN — INSULIN DETEMIR 10 UNITS: 100 INJECTION, SOLUTION SUBCUTANEOUS at 11:15

## 2019-04-25 RX ADMIN — INSULIN LISPRO 8 UNITS: 100 INJECTION, SOLUTION INTRAVENOUS; SUBCUTANEOUS at 14:54

## 2019-04-25 RX ADMIN — GABAPENTIN 1200 MG: 400 CAPSULE ORAL at 21:08

## 2019-04-25 RX ADMIN — INSULIN LISPRO 5 UNITS: 100 INJECTION, SOLUTION INTRAVENOUS; SUBCUTANEOUS at 11:15

## 2019-04-25 RX ADMIN — SODIUM CHLORIDE 125 ML/HR: 9 INJECTION, SOLUTION INTRAVENOUS at 04:06

## 2019-04-25 RX ADMIN — DULOXETINE HYDROCHLORIDE 30 MG: 30 CAPSULE, DELAYED RELEASE ORAL at 09:27

## 2019-04-25 RX ADMIN — SODIUM CHLORIDE 50 ML/HR: 9 INJECTION, SOLUTION INTRAVENOUS at 09:28

## 2019-04-25 RX ADMIN — TAZOBACTAM SODIUM AND PIPERACILLIN SODIUM 3.38 G: 375; 3 INJECTION, SOLUTION INTRAVENOUS at 14:55

## 2019-04-25 RX ADMIN — GABAPENTIN 1200 MG: 400 CAPSULE ORAL at 09:27

## 2019-04-25 RX ADMIN — SODIUM CHLORIDE 50 ML/HR: 9 INJECTION, SOLUTION INTRAVENOUS at 18:27

## 2019-04-25 RX ADMIN — HYDROCODONE BITARTRATE AND ACETAMINOPHEN 1 TABLET: 10; 325 TABLET ORAL at 04:06

## 2019-04-25 RX ADMIN — INSULIN DETEMIR 10 UNITS: 100 INJECTION, SOLUTION SUBCUTANEOUS at 21:08

## 2019-04-25 RX ADMIN — INSULIN LISPRO 8 UNITS: 100 INJECTION, SOLUTION INTRAVENOUS; SUBCUTANEOUS at 09:27

## 2019-04-25 RX ADMIN — TAZOBACTAM SODIUM AND PIPERACILLIN SODIUM 3.38 G: 375; 3 INJECTION, SOLUTION INTRAVENOUS at 05:34

## 2019-04-25 RX ADMIN — INSULIN HUMAN 10 UNITS: 100 INJECTION, SOLUTION PARENTERAL at 01:58

## 2019-04-25 RX ADMIN — INSULIN LISPRO 8 UNITS: 100 INJECTION, SOLUTION INTRAVENOUS; SUBCUTANEOUS at 23:38

## 2019-04-25 RX ADMIN — LISINOPRIL 40 MG: 40 TABLET ORAL at 09:27

## 2019-04-25 RX ADMIN — DAPTOMYCIN 400 MG: 500 INJECTION, POWDER, LYOPHILIZED, FOR SOLUTION INTRAVENOUS at 11:06

## 2019-04-25 RX ADMIN — SODIUM CHLORIDE, PRESERVATIVE FREE 3 ML: 5 INJECTION INTRAVENOUS at 09:28

## 2019-04-25 RX ADMIN — SODIUM CHLORIDE 1000 ML: 9 INJECTION, SOLUTION INTRAVENOUS at 01:58

## 2019-04-25 RX ADMIN — Medication 1 CAPSULE: at 09:27

## 2019-04-25 RX ADMIN — INSULIN LISPRO 5 UNITS: 100 INJECTION, SOLUTION INTRAVENOUS; SUBCUTANEOUS at 17:45

## 2019-04-25 RX ADMIN — INSULIN LISPRO 5 UNITS: 100 INJECTION, SOLUTION INTRAVENOUS; SUBCUTANEOUS at 21:08

## 2019-04-25 RX ADMIN — TAZOBACTAM SODIUM AND PIPERACILLIN SODIUM 3.38 G: 375; 3 INJECTION, SOLUTION INTRAVENOUS at 22:41

## 2019-04-25 RX ADMIN — INSULIN LISPRO 12 UNITS: 100 INJECTION, SOLUTION INTRAVENOUS; SUBCUTANEOUS at 05:34

## 2019-04-26 ENCOUNTER — ANESTHESIA (OUTPATIENT)
Dept: PERIOP | Facility: HOSPITAL | Age: 49
End: 2019-04-26

## 2019-04-26 ENCOUNTER — ANESTHESIA EVENT (OUTPATIENT)
Dept: PERIOP | Facility: HOSPITAL | Age: 49
End: 2019-04-26

## 2019-04-26 LAB
ALBUMIN SERPL-MCNC: 2.7 G/DL (ref 3.5–5.2)
ALBUMIN/GLOB SERPL: 0.8 G/DL
ALP SERPL-CCNC: 145 U/L (ref 39–117)
ALT SERPL W P-5'-P-CCNC: 15 U/L (ref 1–41)
ANION GAP SERPL CALCULATED.3IONS-SCNC: 13 MMOL/L
AST SERPL-CCNC: 17 U/L (ref 1–40)
BILIRUB SERPL-MCNC: 0.4 MG/DL (ref 0.2–1.2)
BUN BLD-MCNC: 16 MG/DL (ref 6–20)
BUN/CREAT SERPL: 25.4 (ref 7–25)
CALCIUM SPEC-SCNC: 8.3 MG/DL (ref 8.6–10.5)
CHLORIDE SERPL-SCNC: 97 MMOL/L (ref 98–107)
CO2 SERPL-SCNC: 23 MMOL/L (ref 22–29)
CREAT BLD-MCNC: 0.63 MG/DL (ref 0.76–1.27)
DEPRECATED RDW RBC AUTO: 42.4 FL (ref 37–54)
ERYTHROCYTE [DISTWIDTH] IN BLOOD BY AUTOMATED COUNT: 13.6 % (ref 12.3–15.4)
GFR SERPL CREATININE-BSD FRML MDRD: 135 ML/MIN/1.73
GLOBULIN UR ELPH-MCNC: 3.2 GM/DL
GLUCOSE BLD-MCNC: 234 MG/DL (ref 65–99)
GLUCOSE BLDC GLUCOMTR-MCNC: 173 MG/DL (ref 70–130)
GLUCOSE BLDC GLUCOMTR-MCNC: 242 MG/DL (ref 70–130)
GLUCOSE BLDC GLUCOMTR-MCNC: 279 MG/DL (ref 70–130)
GLUCOSE BLDC GLUCOMTR-MCNC: 280 MG/DL (ref 70–130)
GLUCOSE BLDC GLUCOMTR-MCNC: 426 MG/DL (ref 70–130)
GLUCOSE BLDC GLUCOMTR-MCNC: 439 MG/DL (ref 70–130)
GLUCOSE BLDC GLUCOMTR-MCNC: 440 MG/DL (ref 70–130)
GLUCOSE BLDC GLUCOMTR-MCNC: 458 MG/DL (ref 70–130)
GLUCOSE BLDC GLUCOMTR-MCNC: 467 MG/DL (ref 70–130)
HCT VFR BLD AUTO: 35.3 % (ref 37.5–51)
HGB BLD-MCNC: 11.6 G/DL (ref 13–17.7)
MAGNESIUM SERPL-MCNC: 1.8 MG/DL (ref 1.6–2.6)
MCH RBC QN AUTO: 28.2 PG (ref 26.6–33)
MCHC RBC AUTO-ENTMCNC: 32.9 G/DL (ref 31.5–35.7)
MCV RBC AUTO: 85.7 FL (ref 79–97)
PLATELET # BLD AUTO: 203 10*3/MM3 (ref 140–450)
PMV BLD AUTO: 12.5 FL (ref 6–12)
POTASSIUM BLD-SCNC: 3.4 MMOL/L (ref 3.5–5.2)
POTASSIUM BLD-SCNC: 4 MMOL/L (ref 3.5–5.2)
PROT SERPL-MCNC: 5.9 G/DL (ref 6–8.5)
RBC # BLD AUTO: 4.12 10*6/MM3 (ref 4.14–5.8)
SODIUM BLD-SCNC: 133 MMOL/L (ref 136–145)
WBC NRBC COR # BLD: 17.34 10*3/MM3 (ref 3.4–10.8)

## 2019-04-26 PROCEDURE — 88304 TISSUE EXAM BY PATHOLOGIST: CPT | Performed by: SURGERY

## 2019-04-26 PROCEDURE — 87206 SMEAR FLUORESCENT/ACID STAI: CPT | Performed by: SURGERY

## 2019-04-26 PROCEDURE — 63710000001 INSULIN DETEMIR PER 5 UNITS: Performed by: INTERNAL MEDICINE

## 2019-04-26 PROCEDURE — 25010000002 DEXAMETHASONE PER 1 MG: Performed by: NURSE ANESTHETIST, CERTIFIED REGISTERED

## 2019-04-26 PROCEDURE — 87075 CULTR BACTERIA EXCEPT BLOOD: CPT | Performed by: SURGERY

## 2019-04-26 PROCEDURE — 63710000001 INSULIN LISPRO (HUMAN) PER 5 UNITS: Performed by: NURSE PRACTITIONER

## 2019-04-26 PROCEDURE — 82962 GLUCOSE BLOOD TEST: CPT

## 2019-04-26 PROCEDURE — 0JB80ZZ EXCISION OF ABDOMEN SUBCUTANEOUS TISSUE AND FASCIA, OPEN APPROACH: ICD-10-PCS | Performed by: SURGERY

## 2019-04-26 PROCEDURE — 25010000002 PROPOFOL 10 MG/ML EMULSION: Performed by: NURSE ANESTHETIST, CERTIFIED REGISTERED

## 2019-04-26 PROCEDURE — 25010000002 ONDANSETRON PER 1 MG: Performed by: NURSE ANESTHETIST, CERTIFIED REGISTERED

## 2019-04-26 PROCEDURE — 87116 MYCOBACTERIA CULTURE: CPT | Performed by: SURGERY

## 2019-04-26 PROCEDURE — 83735 ASSAY OF MAGNESIUM: CPT | Performed by: INTERNAL MEDICINE

## 2019-04-26 PROCEDURE — 25010000002 PIPERACILLIN SOD-TAZOBACTAM PER 1 G: Performed by: NURSE PRACTITIONER

## 2019-04-26 PROCEDURE — 87070 CULTURE OTHR SPECIMN AEROBIC: CPT | Performed by: SURGERY

## 2019-04-26 PROCEDURE — 25010000002 DAPTOMYCIN PER 1 MG: Performed by: INTERNAL MEDICINE

## 2019-04-26 PROCEDURE — 87102 FUNGUS ISOLATION CULTURE: CPT | Performed by: SURGERY

## 2019-04-26 PROCEDURE — 25010000002 MAGNESIUM SULFATE 2 GM/50ML SOLUTION: Performed by: INTERNAL MEDICINE

## 2019-04-26 PROCEDURE — 87205 SMEAR GRAM STAIN: CPT | Performed by: SURGERY

## 2019-04-26 PROCEDURE — 99233 SBSQ HOSP IP/OBS HIGH 50: CPT | Performed by: INTERNAL MEDICINE

## 2019-04-26 PROCEDURE — 63710000001 INSULIN LISPRO (HUMAN) PER 5 UNITS: Performed by: INTERNAL MEDICINE

## 2019-04-26 PROCEDURE — 85027 COMPLETE CBC AUTOMATED: CPT | Performed by: INTERNAL MEDICINE

## 2019-04-26 PROCEDURE — 25010000002 FENTANYL CITRATE (PF) 100 MCG/2ML SOLUTION: Performed by: NURSE ANESTHETIST, CERTIFIED REGISTERED

## 2019-04-26 PROCEDURE — 93005 ELECTROCARDIOGRAM TRACING: CPT | Performed by: ANESTHESIOLOGY

## 2019-04-26 PROCEDURE — 25010000002 HYDROMORPHONE PER 4 MG: Performed by: NURSE ANESTHETIST, CERTIFIED REGISTERED

## 2019-04-26 PROCEDURE — 80053 COMPREHEN METABOLIC PANEL: CPT | Performed by: INTERNAL MEDICINE

## 2019-04-26 PROCEDURE — 84132 ASSAY OF SERUM POTASSIUM: CPT | Performed by: ANESTHESIOLOGY

## 2019-04-26 RX ORDER — NALOXONE HCL 0.4 MG/ML
0.4 VIAL (ML) INJECTION AS NEEDED
Status: DISCONTINUED | OUTPATIENT
Start: 2019-04-26 | End: 2019-04-26 | Stop reason: HOSPADM

## 2019-04-26 RX ORDER — FAMOTIDINE 10 MG/ML
20 INJECTION, SOLUTION INTRAVENOUS ONCE
Status: CANCELLED | OUTPATIENT
Start: 2019-04-26 | End: 2019-04-26

## 2019-04-26 RX ORDER — PROMETHAZINE HYDROCHLORIDE 25 MG/1
25 SUPPOSITORY RECTAL ONCE AS NEEDED
Status: DISCONTINUED | OUTPATIENT
Start: 2019-04-26 | End: 2019-04-26 | Stop reason: HOSPADM

## 2019-04-26 RX ORDER — SODIUM CHLORIDE 9 MG/ML
50 INJECTION, SOLUTION INTRAVENOUS CONTINUOUS
Status: DISCONTINUED | OUTPATIENT
Start: 2019-04-26 | End: 2019-04-27

## 2019-04-26 RX ORDER — HYDRALAZINE HYDROCHLORIDE 20 MG/ML
5 INJECTION INTRAMUSCULAR; INTRAVENOUS
Status: DISCONTINUED | OUTPATIENT
Start: 2019-04-26 | End: 2019-04-26 | Stop reason: HOSPADM

## 2019-04-26 RX ORDER — HYDROCODONE BITARTRATE AND ACETAMINOPHEN 5; 325 MG/1; MG/1
1 TABLET ORAL ONCE AS NEEDED
Status: DISCONTINUED | OUTPATIENT
Start: 2019-04-26 | End: 2019-04-26 | Stop reason: HOSPADM

## 2019-04-26 RX ORDER — FAMOTIDINE 20 MG/1
20 TABLET, FILM COATED ORAL ONCE
Status: COMPLETED | OUTPATIENT
Start: 2019-04-26 | End: 2019-04-26

## 2019-04-26 RX ORDER — HYDROMORPHONE HYDROCHLORIDE 1 MG/ML
0.5 INJECTION, SOLUTION INTRAMUSCULAR; INTRAVENOUS; SUBCUTANEOUS
Status: DISCONTINUED | OUTPATIENT
Start: 2019-04-26 | End: 2019-04-26 | Stop reason: HOSPADM

## 2019-04-26 RX ORDER — TAMSULOSIN HYDROCHLORIDE 0.4 MG/1
0.4 CAPSULE ORAL NIGHTLY
Status: DISCONTINUED | OUTPATIENT
Start: 2019-04-26 | End: 2019-05-01

## 2019-04-26 RX ORDER — SODIUM CHLORIDE 0.9 % (FLUSH) 0.9 %
3 SYRINGE (ML) INJECTION EVERY 12 HOURS SCHEDULED
Status: DISCONTINUED | OUTPATIENT
Start: 2019-04-26 | End: 2019-04-26 | Stop reason: HOSPADM

## 2019-04-26 RX ORDER — SODIUM CHLORIDE 0.9 % (FLUSH) 0.9 %
3-10 SYRINGE (ML) INJECTION AS NEEDED
Status: CANCELLED | OUTPATIENT
Start: 2019-04-26

## 2019-04-26 RX ORDER — PROMETHAZINE HYDROCHLORIDE 25 MG/1
25 TABLET ORAL ONCE AS NEEDED
Status: DISCONTINUED | OUTPATIENT
Start: 2019-04-26 | End: 2019-04-26 | Stop reason: HOSPADM

## 2019-04-26 RX ORDER — ONDANSETRON 2 MG/ML
4 INJECTION INTRAMUSCULAR; INTRAVENOUS ONCE AS NEEDED
Status: DISCONTINUED | OUTPATIENT
Start: 2019-04-26 | End: 2019-04-26 | Stop reason: HOSPADM

## 2019-04-26 RX ORDER — ONDANSETRON 2 MG/ML
INJECTION INTRAMUSCULAR; INTRAVENOUS AS NEEDED
Status: DISCONTINUED | OUTPATIENT
Start: 2019-04-26 | End: 2019-04-26 | Stop reason: SURG

## 2019-04-26 RX ORDER — POTASSIUM CHLORIDE 1.5 G/1.77G
40 POWDER, FOR SOLUTION ORAL AS NEEDED
Status: DISCONTINUED | OUTPATIENT
Start: 2019-04-26 | End: 2019-05-03 | Stop reason: HOSPADM

## 2019-04-26 RX ORDER — PROMETHAZINE HYDROCHLORIDE 25 MG/ML
6.25 INJECTION, SOLUTION INTRAMUSCULAR; INTRAVENOUS ONCE AS NEEDED
Status: DISCONTINUED | OUTPATIENT
Start: 2019-04-26 | End: 2019-04-26 | Stop reason: HOSPADM

## 2019-04-26 RX ORDER — MAGNESIUM HYDROXIDE 1200 MG/15ML
LIQUID ORAL AS NEEDED
Status: DISCONTINUED | OUTPATIENT
Start: 2019-04-26 | End: 2019-04-26 | Stop reason: HOSPADM

## 2019-04-26 RX ORDER — IPRATROPIUM BROMIDE AND ALBUTEROL SULFATE 2.5; .5 MG/3ML; MG/3ML
3 SOLUTION RESPIRATORY (INHALATION) ONCE AS NEEDED
Status: DISCONTINUED | OUTPATIENT
Start: 2019-04-26 | End: 2019-04-26 | Stop reason: HOSPADM

## 2019-04-26 RX ORDER — POTASSIUM CHLORIDE 750 MG/1
40 CAPSULE, EXTENDED RELEASE ORAL AS NEEDED
Status: DISCONTINUED | OUTPATIENT
Start: 2019-04-26 | End: 2019-05-03 | Stop reason: HOSPADM

## 2019-04-26 RX ORDER — POTASSIUM CHLORIDE 7.45 MG/ML
10 INJECTION INTRAVENOUS
Status: DISCONTINUED | OUTPATIENT
Start: 2019-04-26 | End: 2019-05-03 | Stop reason: HOSPADM

## 2019-04-26 RX ORDER — PROPOFOL 10 MG/ML
VIAL (ML) INTRAVENOUS AS NEEDED
Status: DISCONTINUED | OUTPATIENT
Start: 2019-04-26 | End: 2019-04-26 | Stop reason: SURG

## 2019-04-26 RX ORDER — SODIUM CHLORIDE, SODIUM LACTATE, POTASSIUM CHLORIDE, CALCIUM CHLORIDE 600; 310; 30; 20 MG/100ML; MG/100ML; MG/100ML; MG/100ML
9 INJECTION, SOLUTION INTRAVENOUS CONTINUOUS
Status: DISCONTINUED | OUTPATIENT
Start: 2019-04-26 | End: 2019-04-30

## 2019-04-26 RX ORDER — FENTANYL CITRATE 50 UG/ML
INJECTION, SOLUTION INTRAMUSCULAR; INTRAVENOUS AS NEEDED
Status: DISCONTINUED | OUTPATIENT
Start: 2019-04-26 | End: 2019-04-26 | Stop reason: SURG

## 2019-04-26 RX ORDER — LABETALOL HYDROCHLORIDE 5 MG/ML
5 INJECTION, SOLUTION INTRAVENOUS
Status: DISCONTINUED | OUTPATIENT
Start: 2019-04-26 | End: 2019-04-26 | Stop reason: HOSPADM

## 2019-04-26 RX ORDER — DEXAMETHASONE SODIUM PHOSPHATE 4 MG/ML
INJECTION, SOLUTION INTRA-ARTICULAR; INTRALESIONAL; INTRAMUSCULAR; INTRAVENOUS; SOFT TISSUE AS NEEDED
Status: DISCONTINUED | OUTPATIENT
Start: 2019-04-26 | End: 2019-04-26 | Stop reason: SURG

## 2019-04-26 RX ORDER — LIDOCAINE HYDROCHLORIDE 10 MG/ML
INJECTION, SOLUTION EPIDURAL; INFILTRATION; INTRACAUDAL; PERINEURAL AS NEEDED
Status: DISCONTINUED | OUTPATIENT
Start: 2019-04-26 | End: 2019-04-26 | Stop reason: SURG

## 2019-04-26 RX ORDER — FENTANYL CITRATE 50 UG/ML
50 INJECTION, SOLUTION INTRAMUSCULAR; INTRAVENOUS
Status: DISCONTINUED | OUTPATIENT
Start: 2019-04-26 | End: 2019-04-26 | Stop reason: HOSPADM

## 2019-04-26 RX ORDER — MEPERIDINE HYDROCHLORIDE 25 MG/ML
12.5 INJECTION INTRAMUSCULAR; INTRAVENOUS; SUBCUTANEOUS
Status: DISCONTINUED | OUTPATIENT
Start: 2019-04-26 | End: 2019-04-26 | Stop reason: HOSPADM

## 2019-04-26 RX ORDER — SODIUM CHLORIDE 0.9 % (FLUSH) 0.9 %
1-10 SYRINGE (ML) INJECTION AS NEEDED
Status: DISCONTINUED | OUTPATIENT
Start: 2019-04-26 | End: 2019-04-26 | Stop reason: HOSPADM

## 2019-04-26 RX ORDER — LIDOCAINE HYDROCHLORIDE 10 MG/ML
0.5 INJECTION, SOLUTION EPIDURAL; INFILTRATION; INTRACAUDAL; PERINEURAL ONCE AS NEEDED
Status: CANCELLED | OUTPATIENT
Start: 2019-04-26

## 2019-04-26 RX ADMIN — INSULIN DETEMIR 10 UNITS: 100 INJECTION, SOLUTION SUBCUTANEOUS at 10:05

## 2019-04-26 RX ADMIN — Medication 1 CAPSULE: at 09:58

## 2019-04-26 RX ADMIN — ONDANSETRON 4 MG: 2 INJECTION INTRAMUSCULAR; INTRAVENOUS at 13:50

## 2019-04-26 RX ADMIN — LIDOCAINE HYDROCHLORIDE 50 MG: 10 INJECTION, SOLUTION EPIDURAL; INFILTRATION; INTRACAUDAL; PERINEURAL at 13:27

## 2019-04-26 RX ADMIN — LISINOPRIL 40 MG: 40 TABLET ORAL at 10:04

## 2019-04-26 RX ADMIN — INSULIN DETEMIR 16 UNITS: 100 INJECTION, SOLUTION SUBCUTANEOUS at 20:52

## 2019-04-26 RX ADMIN — GABAPENTIN 1200 MG: 400 CAPSULE ORAL at 16:00

## 2019-04-26 RX ADMIN — FENTANYL CITRATE 25 MCG: 50 INJECTION, SOLUTION INTRAMUSCULAR; INTRAVENOUS at 13:35

## 2019-04-26 RX ADMIN — HYDROCODONE BITARTRATE AND ACETAMINOPHEN 1 TABLET: 10; 325 TABLET ORAL at 16:00

## 2019-04-26 RX ADMIN — INSULIN LISPRO 12 UNITS: 100 INJECTION, SOLUTION INTRAVENOUS; SUBCUTANEOUS at 20:51

## 2019-04-26 RX ADMIN — DAPTOMYCIN 400 MG: 500 INJECTION, POWDER, LYOPHILIZED, FOR SOLUTION INTRAVENOUS at 10:00

## 2019-04-26 RX ADMIN — DULOXETINE HYDROCHLORIDE 30 MG: 30 CAPSULE, DELAYED RELEASE ORAL at 09:58

## 2019-04-26 RX ADMIN — TAMSULOSIN HYDROCHLORIDE 0.4 MG: 0.4 CAPSULE ORAL at 21:37

## 2019-04-26 RX ADMIN — SODIUM CHLORIDE 125 ML/HR: 9 INJECTION, SOLUTION INTRAVENOUS at 10:04

## 2019-04-26 RX ADMIN — TAZOBACTAM SODIUM AND PIPERACILLIN SODIUM 3.38 G: 375; 3 INJECTION, SOLUTION INTRAVENOUS at 21:37

## 2019-04-26 RX ADMIN — FENTANYL CITRATE 25 MCG: 50 INJECTION, SOLUTION INTRAMUSCULAR; INTRAVENOUS at 13:55

## 2019-04-26 RX ADMIN — FAMOTIDINE 20 MG: 20 TABLET ORAL at 12:20

## 2019-04-26 RX ADMIN — INSULIN LISPRO 14 UNITS: 100 INJECTION, SOLUTION INTRAVENOUS; SUBCUTANEOUS at 23:43

## 2019-04-26 RX ADMIN — INSULIN LISPRO 3 UNITS: 100 INJECTION, SOLUTION INTRAVENOUS; SUBCUTANEOUS at 05:51

## 2019-04-26 RX ADMIN — DEXAMETHASONE SODIUM PHOSPHATE 8 MG: 4 INJECTION, SOLUTION INTRAMUSCULAR; INTRAVENOUS at 13:35

## 2019-04-26 RX ADMIN — SODIUM CHLORIDE, POTASSIUM CHLORIDE, SODIUM LACTATE AND CALCIUM CHLORIDE 9 ML/HR: 600; 310; 30; 20 INJECTION, SOLUTION INTRAVENOUS at 12:20

## 2019-04-26 RX ADMIN — SODIUM CHLORIDE 50 ML/HR: 9 INJECTION, SOLUTION INTRAVENOUS at 16:01

## 2019-04-26 RX ADMIN — FENTANYL CITRATE 25 MCG: 50 INJECTION, SOLUTION INTRAMUSCULAR; INTRAVENOUS at 14:11

## 2019-04-26 RX ADMIN — SODIUM CHLORIDE, PRESERVATIVE FREE 3 ML: 5 INJECTION INTRAVENOUS at 09:59

## 2019-04-26 RX ADMIN — MAGNESIUM SULFATE HEPTAHYDRATE 2 G: 40 INJECTION, SOLUTION INTRAVENOUS at 09:58

## 2019-04-26 RX ADMIN — GABAPENTIN 1200 MG: 400 CAPSULE ORAL at 20:51

## 2019-04-26 RX ADMIN — INSULIN LISPRO 14 UNITS: 100 INJECTION, SOLUTION INTRAVENOUS; SUBCUTANEOUS at 17:11

## 2019-04-26 RX ADMIN — INSULIN LISPRO 8 UNITS: 100 INJECTION, SOLUTION INTRAVENOUS; SUBCUTANEOUS at 02:21

## 2019-04-26 RX ADMIN — PROPOFOL 200 MG: 10 INJECTION, EMULSION INTRAVENOUS at 13:27

## 2019-04-26 RX ADMIN — TAZOBACTAM SODIUM AND PIPERACILLIN SODIUM 3.38 G: 375; 3 INJECTION, SOLUTION INTRAVENOUS at 05:51

## 2019-04-26 RX ADMIN — TAZOBACTAM SODIUM AND PIPERACILLIN SODIUM 3.38 G: 375; 3 INJECTION, SOLUTION INTRAVENOUS at 16:00

## 2019-04-26 RX ADMIN — FENTANYL CITRATE 25 MCG: 50 INJECTION, SOLUTION INTRAMUSCULAR; INTRAVENOUS at 13:24

## 2019-04-26 RX ADMIN — HYDROMORPHONE HYDROCHLORIDE 0.5 MG: 1 INJECTION, SOLUTION INTRAMUSCULAR; INTRAVENOUS; SUBCUTANEOUS at 14:30

## 2019-04-26 RX ADMIN — GABAPENTIN 1200 MG: 400 CAPSULE ORAL at 09:58

## 2019-04-26 RX ADMIN — INSULIN LISPRO 8 UNITS: 100 INJECTION, SOLUTION INTRAVENOUS; SUBCUTANEOUS at 18:34

## 2019-04-26 RX ADMIN — SODIUM CHLORIDE, POTASSIUM CHLORIDE, SODIUM LACTATE AND CALCIUM CHLORIDE: 600; 310; 30; 20 INJECTION, SOLUTION INTRAVENOUS at 12:21

## 2019-04-26 NOTE — ANESTHESIA PREPROCEDURE EVALUATION
Anesthesia Evaluation     Patient summary reviewed and Nursing notes reviewed   NPO Solid Status: > 8 hours  NPO Liquid Status: > 8 hours           Airway   Mallampati: I  TM distance: >3 FB  Neck ROM: full  No difficulty expected  Dental    (+) edentulous    Pulmonary - normal exam   Cardiovascular   Exercise tolerance: poor (<4 METS)    Rhythm: regular  Rate: normal        Neuro/Psych  GI/Hepatic/Renal/Endo    (+)   diabetes mellitus type 2 poorly controlled using insulin,     Musculoskeletal     Abdominal    Substance History      OB/GYN          Other                        Anesthesia Plan    ASA 3     general     intravenous induction   Anesthetic plan, all risks, benefits, and alternatives have been provided, discussed and informed consent has been obtained with: patient.

## 2019-04-26 NOTE — ANESTHESIA PROCEDURE NOTES
Airway  Urgency: elective    Airway not difficult    General Information and Staff    Patient location during procedure: OR    Indications and Patient Condition  Indications for airway management: airway protection    Preoxygenated: yes  Mask difficulty assessment: 1 - vent by mask    Final Airway Details  Final airway type: supraglottic airway      Successful airway: Supreme  Size 5    Number of attempts at approach: 1    Additional Comments  LMA placed without difficulty, ventilation with assist, equal breath sounds and symmetric chest rise and fall

## 2019-04-27 LAB
ANION GAP SERPL CALCULATED.3IONS-SCNC: 13 MMOL/L
BUN BLD-MCNC: 19 MG/DL (ref 6–20)
BUN/CREAT SERPL: 27.1 (ref 7–25)
CALCIUM SPEC-SCNC: 8.5 MG/DL (ref 8.6–10.5)
CHLORIDE SERPL-SCNC: 99 MMOL/L (ref 98–107)
CO2 SERPL-SCNC: 21 MMOL/L (ref 22–29)
CREAT BLD-MCNC: 0.7 MG/DL (ref 0.76–1.27)
DEPRECATED RDW RBC AUTO: 41.6 FL (ref 37–54)
ERYTHROCYTE [DISTWIDTH] IN BLOOD BY AUTOMATED COUNT: 13.4 % (ref 12.3–15.4)
GFR SERPL CREATININE-BSD FRML MDRD: 119 ML/MIN/1.73
GLUCOSE BLD-MCNC: 330 MG/DL (ref 65–99)
GLUCOSE BLDC GLUCOMTR-MCNC: 175 MG/DL (ref 70–130)
GLUCOSE BLDC GLUCOMTR-MCNC: 182 MG/DL (ref 70–130)
GLUCOSE BLDC GLUCOMTR-MCNC: 199 MG/DL (ref 70–130)
GLUCOSE BLDC GLUCOMTR-MCNC: 261 MG/DL (ref 70–130)
GLUCOSE BLDC GLUCOMTR-MCNC: 275 MG/DL (ref 70–130)
GLUCOSE BLDC GLUCOMTR-MCNC: 290 MG/DL (ref 70–130)
GLUCOSE BLDC GLUCOMTR-MCNC: 293 MG/DL (ref 70–130)
GLUCOSE BLDC GLUCOMTR-MCNC: 368 MG/DL (ref 70–130)
GLUCOSE BLDC GLUCOMTR-MCNC: 390 MG/DL (ref 70–130)
GLUCOSE BLDC GLUCOMTR-MCNC: 426 MG/DL (ref 70–130)
HCT VFR BLD AUTO: 38.3 % (ref 37.5–51)
HGB BLD-MCNC: 12.7 G/DL (ref 13–17.7)
MAGNESIUM SERPL-MCNC: 2.2 MG/DL (ref 1.6–2.6)
MCH RBC QN AUTO: 28.3 PG (ref 26.6–33)
MCHC RBC AUTO-ENTMCNC: 33.2 G/DL (ref 31.5–35.7)
MCV RBC AUTO: 85.3 FL (ref 79–97)
PLATELET # BLD AUTO: 234 10*3/MM3 (ref 140–450)
PMV BLD AUTO: 12.4 FL (ref 6–12)
POTASSIUM BLD-SCNC: 4.3 MMOL/L (ref 3.5–5.2)
RBC # BLD AUTO: 4.49 10*6/MM3 (ref 4.14–5.8)
SODIUM BLD-SCNC: 133 MMOL/L (ref 136–145)
WBC NRBC COR # BLD: 18.83 10*3/MM3 (ref 3.4–10.8)

## 2019-04-27 PROCEDURE — 80048 BASIC METABOLIC PNL TOTAL CA: CPT | Performed by: INTERNAL MEDICINE

## 2019-04-27 PROCEDURE — 25010000002 PIPERACILLIN SOD-TAZOBACTAM PER 1 G: Performed by: NURSE PRACTITIONER

## 2019-04-27 PROCEDURE — 99233 SBSQ HOSP IP/OBS HIGH 50: CPT | Performed by: INTERNAL MEDICINE

## 2019-04-27 PROCEDURE — 83735 ASSAY OF MAGNESIUM: CPT | Performed by: INTERNAL MEDICINE

## 2019-04-27 PROCEDURE — 82962 GLUCOSE BLOOD TEST: CPT

## 2019-04-27 PROCEDURE — 63710000001 INSULIN DETEMIR PER 5 UNITS: Performed by: INTERNAL MEDICINE

## 2019-04-27 PROCEDURE — 25010000002 DAPTOMYCIN PER 1 MG: Performed by: INTERNAL MEDICINE

## 2019-04-27 PROCEDURE — 63710000001 INSULIN LISPRO (HUMAN) PER 5 UNITS: Performed by: INTERNAL MEDICINE

## 2019-04-27 PROCEDURE — 85027 COMPLETE CBC AUTOMATED: CPT | Performed by: INTERNAL MEDICINE

## 2019-04-27 PROCEDURE — 25010000002 ENOXAPARIN PER 10 MG: Performed by: NURSE PRACTITIONER

## 2019-04-27 PROCEDURE — 25010000002 HYDRALAZINE PER 20 MG: Performed by: INTERNAL MEDICINE

## 2019-04-27 RX ORDER — HYDRALAZINE HYDROCHLORIDE 20 MG/ML
10 INJECTION INTRAMUSCULAR; INTRAVENOUS EVERY 4 HOURS PRN
Status: DISCONTINUED | OUTPATIENT
Start: 2019-04-27 | End: 2019-05-03 | Stop reason: HOSPADM

## 2019-04-27 RX ADMIN — INSULIN LISPRO 8 UNITS: 100 INJECTION, SOLUTION INTRAVENOUS; SUBCUTANEOUS at 18:25

## 2019-04-27 RX ADMIN — INSULIN LISPRO 10 UNITS: 100 INJECTION, SOLUTION INTRAVENOUS; SUBCUTANEOUS at 04:22

## 2019-04-27 RX ADMIN — ENOXAPARIN SODIUM 40 MG: 40 INJECTION SUBCUTANEOUS at 06:42

## 2019-04-27 RX ADMIN — Medication 1 CAPSULE: at 08:50

## 2019-04-27 RX ADMIN — INSULIN LISPRO 3 UNITS: 100 INJECTION, SOLUTION INTRAVENOUS; SUBCUTANEOUS at 14:17

## 2019-04-27 RX ADMIN — TAZOBACTAM SODIUM AND PIPERACILLIN SODIUM 3.38 G: 375; 3 INJECTION, SOLUTION INTRAVENOUS at 14:17

## 2019-04-27 RX ADMIN — INSULIN LISPRO 8 UNITS: 100 INJECTION, SOLUTION INTRAVENOUS; SUBCUTANEOUS at 06:42

## 2019-04-27 RX ADMIN — HYDROCODONE BITARTRATE AND ACETAMINOPHEN 1 TABLET: 10; 325 TABLET ORAL at 09:02

## 2019-04-27 RX ADMIN — TAZOBACTAM SODIUM AND PIPERACILLIN SODIUM 3.38 G: 375; 3 INJECTION, SOLUTION INTRAVENOUS at 06:42

## 2019-04-27 RX ADMIN — GABAPENTIN 1200 MG: 400 CAPSULE ORAL at 20:20

## 2019-04-27 RX ADMIN — TAMSULOSIN HYDROCHLORIDE 0.4 MG: 0.4 CAPSULE ORAL at 20:20

## 2019-04-27 RX ADMIN — DAPTOMYCIN 400 MG: 500 INJECTION, POWDER, LYOPHILIZED, FOR SOLUTION INTRAVENOUS at 08:49

## 2019-04-27 RX ADMIN — INSULIN LISPRO 8 UNITS: 100 INJECTION, SOLUTION INTRAVENOUS; SUBCUTANEOUS at 11:50

## 2019-04-27 RX ADMIN — INSULIN DETEMIR 16 UNITS: 100 INJECTION, SOLUTION SUBCUTANEOUS at 08:49

## 2019-04-27 RX ADMIN — INSULIN LISPRO 3 UNITS: 100 INJECTION, SOLUTION INTRAVENOUS; SUBCUTANEOUS at 16:33

## 2019-04-27 RX ADMIN — SODIUM CHLORIDE, PRESERVATIVE FREE 3 ML: 5 INJECTION INTRAVENOUS at 08:52

## 2019-04-27 RX ADMIN — LISINOPRIL 40 MG: 40 TABLET ORAL at 08:52

## 2019-04-27 RX ADMIN — INSULIN LISPRO 8 UNITS: 100 INJECTION, SOLUTION INTRAVENOUS; SUBCUTANEOUS at 08:50

## 2019-04-27 RX ADMIN — GABAPENTIN 1200 MG: 400 CAPSULE ORAL at 08:50

## 2019-04-27 RX ADMIN — TAZOBACTAM SODIUM AND PIPERACILLIN SODIUM 3.38 G: 375; 3 INJECTION, SOLUTION INTRAVENOUS at 21:31

## 2019-04-27 RX ADMIN — HYDRALAZINE HYDROCHLORIDE 10 MG: 20 INJECTION INTRAMUSCULAR; INTRAVENOUS at 15:36

## 2019-04-27 RX ADMIN — INSULIN LISPRO 12 UNITS: 100 INJECTION, SOLUTION INTRAVENOUS; SUBCUTANEOUS at 16:33

## 2019-04-27 RX ADMIN — INSULIN LISPRO 14 UNITS: 100 INJECTION, SOLUTION INTRAVENOUS; SUBCUTANEOUS at 02:22

## 2019-04-27 RX ADMIN — INSULIN LISPRO 3 UNITS: 100 INJECTION, SOLUTION INTRAVENOUS; SUBCUTANEOUS at 21:31

## 2019-04-27 RX ADMIN — DULOXETINE HYDROCHLORIDE 30 MG: 30 CAPSULE, DELAYED RELEASE ORAL at 08:50

## 2019-04-27 RX ADMIN — INSULIN LISPRO 12 UNITS: 100 INJECTION, SOLUTION INTRAVENOUS; SUBCUTANEOUS at 09:52

## 2019-04-27 RX ADMIN — GABAPENTIN 1200 MG: 400 CAPSULE ORAL at 15:36

## 2019-04-27 RX ADMIN — INSULIN LISPRO 8 UNITS: 100 INJECTION, SOLUTION INTRAVENOUS; SUBCUTANEOUS at 08:51

## 2019-04-27 RX ADMIN — INSULIN DETEMIR 20 UNITS: 100 INJECTION, SOLUTION SUBCUTANEOUS at 20:21

## 2019-04-28 LAB
ALBUMIN SERPL-MCNC: 2.4 G/DL (ref 3.5–5.2)
ALBUMIN/GLOB SERPL: 0.7 G/DL
ALP SERPL-CCNC: 146 U/L (ref 39–117)
ALT SERPL W P-5'-P-CCNC: 17 U/L (ref 1–41)
ANION GAP SERPL CALCULATED.3IONS-SCNC: 10 MMOL/L
AST SERPL-CCNC: 13 U/L (ref 1–40)
BACTERIA SPEC AEROBE CULT: ABNORMAL
BILIRUB SERPL-MCNC: 0.2 MG/DL (ref 0.2–1.2)
BUN BLD-MCNC: 28 MG/DL (ref 6–20)
BUN/CREAT SERPL: 41.2 (ref 7–25)
CALCIUM SPEC-SCNC: 8 MG/DL (ref 8.6–10.5)
CHLORIDE SERPL-SCNC: 99 MMOL/L (ref 98–107)
CO2 SERPL-SCNC: 23 MMOL/L (ref 22–29)
CREAT BLD-MCNC: 0.68 MG/DL (ref 0.76–1.27)
DEPRECATED RDW RBC AUTO: 43.6 FL (ref 37–54)
ERYTHROCYTE [DISTWIDTH] IN BLOOD BY AUTOMATED COUNT: 13.7 % (ref 12.3–15.4)
GFR SERPL CREATININE-BSD FRML MDRD: 123 ML/MIN/1.73
GLOBULIN UR ELPH-MCNC: 3.3 GM/DL
GLUCOSE BLD-MCNC: 372 MG/DL (ref 65–99)
GLUCOSE BLDC GLUCOMTR-MCNC: 159 MG/DL (ref 70–130)
GLUCOSE BLDC GLUCOMTR-MCNC: 175 MG/DL (ref 70–130)
GLUCOSE BLDC GLUCOMTR-MCNC: 179 MG/DL (ref 70–130)
GLUCOSE BLDC GLUCOMTR-MCNC: 215 MG/DL (ref 70–130)
GLUCOSE BLDC GLUCOMTR-MCNC: 234 MG/DL (ref 70–130)
GLUCOSE BLDC GLUCOMTR-MCNC: 263 MG/DL (ref 70–130)
GLUCOSE BLDC GLUCOMTR-MCNC: 319 MG/DL (ref 70–130)
GLUCOSE BLDC GLUCOMTR-MCNC: 347 MG/DL (ref 70–130)
GLUCOSE BLDC GLUCOMTR-MCNC: 355 MG/DL (ref 70–130)
GLUCOSE BLDC GLUCOMTR-MCNC: 393 MG/DL (ref 70–130)
GRAM STN SPEC: ABNORMAL
HCT VFR BLD AUTO: 35.7 % (ref 37.5–51)
HGB BLD-MCNC: 11.5 G/DL (ref 13–17.7)
MCH RBC QN AUTO: 28.1 PG (ref 26.6–33)
MCHC RBC AUTO-ENTMCNC: 32.2 G/DL (ref 31.5–35.7)
MCV RBC AUTO: 87.3 FL (ref 79–97)
PLATELET # BLD AUTO: 236 10*3/MM3 (ref 140–450)
PMV BLD AUTO: 12.2 FL (ref 6–12)
POTASSIUM BLD-SCNC: 4.4 MMOL/L (ref 3.5–5.2)
PROT SERPL-MCNC: 5.7 G/DL (ref 6–8.5)
RBC # BLD AUTO: 4.09 10*6/MM3 (ref 4.14–5.8)
SODIUM BLD-SCNC: 132 MMOL/L (ref 136–145)
WBC NRBC COR # BLD: 13.3 10*3/MM3 (ref 3.4–10.8)

## 2019-04-28 PROCEDURE — 99233 SBSQ HOSP IP/OBS HIGH 50: CPT | Performed by: INTERNAL MEDICINE

## 2019-04-28 PROCEDURE — 25010000002 HYDRALAZINE PER 20 MG: Performed by: INTERNAL MEDICINE

## 2019-04-28 PROCEDURE — 63710000001 INSULIN DETEMIR PER 5 UNITS: Performed by: INTERNAL MEDICINE

## 2019-04-28 PROCEDURE — 25010000002 DAPTOMYCIN PER 1 MG: Performed by: INTERNAL MEDICINE

## 2019-04-28 PROCEDURE — 63710000001 INSULIN LISPRO (HUMAN) PER 5 UNITS: Performed by: INTERNAL MEDICINE

## 2019-04-28 PROCEDURE — 97165 OT EVAL LOW COMPLEX 30 MIN: CPT

## 2019-04-28 PROCEDURE — 82962 GLUCOSE BLOOD TEST: CPT

## 2019-04-28 PROCEDURE — 25010000002 ENOXAPARIN PER 10 MG: Performed by: NURSE PRACTITIONER

## 2019-04-28 PROCEDURE — 85027 COMPLETE CBC AUTOMATED: CPT | Performed by: INTERNAL MEDICINE

## 2019-04-28 PROCEDURE — 97162 PT EVAL MOD COMPLEX 30 MIN: CPT

## 2019-04-28 PROCEDURE — 80053 COMPREHEN METABOLIC PANEL: CPT | Performed by: INTERNAL MEDICINE

## 2019-04-28 PROCEDURE — 25010000002 PIPERACILLIN SOD-TAZOBACTAM PER 1 G: Performed by: NURSE PRACTITIONER

## 2019-04-28 RX ADMIN — INSULIN LISPRO 12 UNITS: 100 INJECTION, SOLUTION INTRAVENOUS; SUBCUTANEOUS at 12:12

## 2019-04-28 RX ADMIN — TAZOBACTAM SODIUM AND PIPERACILLIN SODIUM 3.38 G: 375; 3 INJECTION, SOLUTION INTRAVENOUS at 20:53

## 2019-04-28 RX ADMIN — DULOXETINE HYDROCHLORIDE 30 MG: 30 CAPSULE, DELAYED RELEASE ORAL at 08:11

## 2019-04-28 RX ADMIN — SODIUM CHLORIDE, PRESERVATIVE FREE 3 ML: 5 INJECTION INTRAVENOUS at 20:53

## 2019-04-28 RX ADMIN — ENOXAPARIN SODIUM 40 MG: 40 INJECTION SUBCUTANEOUS at 06:40

## 2019-04-28 RX ADMIN — Medication 1 CAPSULE: at 08:11

## 2019-04-28 RX ADMIN — TAMSULOSIN HYDROCHLORIDE 0.4 MG: 0.4 CAPSULE ORAL at 20:52

## 2019-04-28 RX ADMIN — LISINOPRIL 40 MG: 40 TABLET ORAL at 08:11

## 2019-04-28 RX ADMIN — INSULIN LISPRO 20 UNITS: 100 INJECTION, SOLUTION INTRAVENOUS; SUBCUTANEOUS at 17:05

## 2019-04-28 RX ADMIN — HYDROCODONE BITARTRATE AND ACETAMINOPHEN 1 TABLET: 10; 325 TABLET ORAL at 08:11

## 2019-04-28 RX ADMIN — INSULIN LISPRO 3 UNITS: 100 INJECTION, SOLUTION INTRAVENOUS; SUBCUTANEOUS at 08:14

## 2019-04-28 RX ADMIN — INSULIN LISPRO 12 UNITS: 100 INJECTION, SOLUTION INTRAVENOUS; SUBCUTANEOUS at 04:47

## 2019-04-28 RX ADMIN — INSULIN LISPRO 12 UNITS: 100 INJECTION, SOLUTION INTRAVENOUS; SUBCUTANEOUS at 08:12

## 2019-04-28 RX ADMIN — GABAPENTIN 1200 MG: 400 CAPSULE ORAL at 16:01

## 2019-04-28 RX ADMIN — INSULIN LISPRO 3 UNITS: 100 INJECTION, SOLUTION INTRAVENOUS; SUBCUTANEOUS at 08:12

## 2019-04-28 RX ADMIN — INSULIN LISPRO 5 UNITS: 100 INJECTION, SOLUTION INTRAVENOUS; SUBCUTANEOUS at 12:12

## 2019-04-28 RX ADMIN — TAZOBACTAM SODIUM AND PIPERACILLIN SODIUM 3.38 G: 375; 3 INJECTION, SOLUTION INTRAVENOUS at 13:54

## 2019-04-28 RX ADMIN — INSULIN LISPRO 10 UNITS: 100 INJECTION, SOLUTION INTRAVENOUS; SUBCUTANEOUS at 06:39

## 2019-04-28 RX ADMIN — GABAPENTIN 1200 MG: 400 CAPSULE ORAL at 20:52

## 2019-04-28 RX ADMIN — INSULIN LISPRO 10 UNITS: 100 INJECTION, SOLUTION INTRAVENOUS; SUBCUTANEOUS at 20:53

## 2019-04-28 RX ADMIN — INSULIN LISPRO 3 UNITS: 100 INJECTION, SOLUTION INTRAVENOUS; SUBCUTANEOUS at 17:06

## 2019-04-28 RX ADMIN — INSULIN DETEMIR 20 UNITS: 100 INJECTION, SOLUTION SUBCUTANEOUS at 08:47

## 2019-04-28 RX ADMIN — INSULIN LISPRO 12 UNITS: 100 INJECTION, SOLUTION INTRAVENOUS; SUBCUTANEOUS at 00:32

## 2019-04-28 RX ADMIN — GABAPENTIN 1200 MG: 400 CAPSULE ORAL at 08:11

## 2019-04-28 RX ADMIN — SODIUM CHLORIDE, PRESERVATIVE FREE 3 ML: 5 INJECTION INTRAVENOUS at 08:11

## 2019-04-28 RX ADMIN — TAZOBACTAM SODIUM AND PIPERACILLIN SODIUM 3.38 G: 375; 3 INJECTION, SOLUTION INTRAVENOUS at 06:47

## 2019-04-28 RX ADMIN — DAPTOMYCIN 400 MG: 500 INJECTION, POWDER, LYOPHILIZED, FOR SOLUTION INTRAVENOUS at 08:11

## 2019-04-28 RX ADMIN — INSULIN LISPRO 5 UNITS: 100 INJECTION, SOLUTION INTRAVENOUS; SUBCUTANEOUS at 14:03

## 2019-04-28 RX ADMIN — INSULIN DETEMIR 30 UNITS: 100 INJECTION, SOLUTION SUBCUTANEOUS at 20:54

## 2019-04-28 RX ADMIN — HYDRALAZINE HYDROCHLORIDE 10 MG: 20 INJECTION INTRAMUSCULAR; INTRAVENOUS at 16:07

## 2019-04-29 LAB
ANION GAP SERPL CALCULATED.3IONS-SCNC: 10 MMOL/L
BACTERIA SPEC AEROBE CULT: NORMAL
BACTERIA SPEC AEROBE CULT: NORMAL
BUN BLD-MCNC: 28 MG/DL (ref 6–20)
BUN/CREAT SERPL: 47.5 (ref 7–25)
CALCIUM SPEC-SCNC: 8.4 MG/DL (ref 8.6–10.5)
CHLORIDE SERPL-SCNC: 100 MMOL/L (ref 98–107)
CO2 SERPL-SCNC: 26 MMOL/L (ref 22–29)
CREAT BLD-MCNC: 0.59 MG/DL (ref 0.76–1.27)
CYTO UR: NORMAL
DEPRECATED RDW RBC AUTO: 43.3 FL (ref 37–54)
ERYTHROCYTE [DISTWIDTH] IN BLOOD BY AUTOMATED COUNT: 13.7 % (ref 12.3–15.4)
GFR SERPL CREATININE-BSD FRML MDRD: 145 ML/MIN/1.73
GLUCOSE BLD-MCNC: 223 MG/DL (ref 65–99)
GLUCOSE BLDC GLUCOMTR-MCNC: 128 MG/DL (ref 70–130)
GLUCOSE BLDC GLUCOMTR-MCNC: 159 MG/DL (ref 70–130)
GLUCOSE BLDC GLUCOMTR-MCNC: 177 MG/DL (ref 70–130)
GLUCOSE BLDC GLUCOMTR-MCNC: 188 MG/DL (ref 70–130)
GLUCOSE BLDC GLUCOMTR-MCNC: 226 MG/DL (ref 70–130)
GLUCOSE BLDC GLUCOMTR-MCNC: 253 MG/DL (ref 70–130)
GLUCOSE BLDC GLUCOMTR-MCNC: 270 MG/DL (ref 70–130)
HCT VFR BLD AUTO: 34.6 % (ref 37.5–51)
HGB BLD-MCNC: 11.3 G/DL (ref 13–17.7)
LAB AP CASE REPORT: NORMAL
LAB AP CLINICAL INFORMATION: NORMAL
MCH RBC QN AUTO: 28.1 PG (ref 26.6–33)
MCHC RBC AUTO-ENTMCNC: 32.7 G/DL (ref 31.5–35.7)
MCV RBC AUTO: 86.1 FL (ref 79–97)
PATH REPORT.FINAL DX SPEC: NORMAL
PATH REPORT.GROSS SPEC: NORMAL
PLATELET # BLD AUTO: 255 10*3/MM3 (ref 140–450)
PMV BLD AUTO: 11.4 FL (ref 6–12)
POTASSIUM BLD-SCNC: 4.3 MMOL/L (ref 3.5–5.2)
RBC # BLD AUTO: 4.02 10*6/MM3 (ref 4.14–5.8)
SODIUM BLD-SCNC: 136 MMOL/L (ref 136–145)
WBC NRBC COR # BLD: 11.76 10*3/MM3 (ref 3.4–10.8)

## 2019-04-29 PROCEDURE — 25010000002 HYDROMORPHONE 1 MG/ML SOLUTION: Performed by: SURGERY

## 2019-04-29 PROCEDURE — 99232 SBSQ HOSP IP/OBS MODERATE 35: CPT | Performed by: INTERNAL MEDICINE

## 2019-04-29 PROCEDURE — 25010000002 PIPERACILLIN SOD-TAZOBACTAM PER 1 G: Performed by: NURSE PRACTITIONER

## 2019-04-29 PROCEDURE — 63710000001 INSULIN LISPRO (HUMAN) PER 5 UNITS: Performed by: INTERNAL MEDICINE

## 2019-04-29 PROCEDURE — 63710000001 INSULIN DETEMIR PER 5 UNITS: Performed by: INTERNAL MEDICINE

## 2019-04-29 PROCEDURE — 82962 GLUCOSE BLOOD TEST: CPT

## 2019-04-29 PROCEDURE — 85027 COMPLETE CBC AUTOMATED: CPT | Performed by: INTERNAL MEDICINE

## 2019-04-29 PROCEDURE — 80048 BASIC METABOLIC PNL TOTAL CA: CPT | Performed by: INTERNAL MEDICINE

## 2019-04-29 PROCEDURE — 25010000002 ENOXAPARIN PER 10 MG: Performed by: NURSE PRACTITIONER

## 2019-04-29 PROCEDURE — 25010000002 DAPTOMYCIN PER 1 MG: Performed by: INTERNAL MEDICINE

## 2019-04-29 RX ORDER — AMLODIPINE BESYLATE 5 MG/1
5 TABLET ORAL
Status: DISCONTINUED | OUTPATIENT
Start: 2019-04-29 | End: 2019-04-30

## 2019-04-29 RX ADMIN — LISINOPRIL 40 MG: 40 TABLET ORAL at 07:49

## 2019-04-29 RX ADMIN — INSULIN LISPRO 20 UNITS: 100 INJECTION, SOLUTION INTRAVENOUS; SUBCUTANEOUS at 09:27

## 2019-04-29 RX ADMIN — INSULIN LISPRO 3 UNITS: 100 INJECTION, SOLUTION INTRAVENOUS; SUBCUTANEOUS at 20:28

## 2019-04-29 RX ADMIN — HYDROCODONE BITARTRATE AND ACETAMINOPHEN 1 TABLET: 10; 325 TABLET ORAL at 00:29

## 2019-04-29 RX ADMIN — GABAPENTIN 1200 MG: 400 CAPSULE ORAL at 17:00

## 2019-04-29 RX ADMIN — INSULIN LISPRO 8 UNITS: 100 INJECTION, SOLUTION INTRAVENOUS; SUBCUTANEOUS at 00:29

## 2019-04-29 RX ADMIN — TAZOBACTAM SODIUM AND PIPERACILLIN SODIUM 3.38 G: 375; 3 INJECTION, SOLUTION INTRAVENOUS at 06:13

## 2019-04-29 RX ADMIN — DULOXETINE HYDROCHLORIDE 30 MG: 30 CAPSULE, DELAYED RELEASE ORAL at 07:51

## 2019-04-29 RX ADMIN — SODIUM CHLORIDE, PRESERVATIVE FREE 3 ML: 5 INJECTION INTRAVENOUS at 20:29

## 2019-04-29 RX ADMIN — INSULIN DETEMIR 35 UNITS: 100 INJECTION, SOLUTION SUBCUTANEOUS at 20:30

## 2019-04-29 RX ADMIN — INSULIN LISPRO 20 UNITS: 100 INJECTION, SOLUTION INTRAVENOUS; SUBCUTANEOUS at 13:51

## 2019-04-29 RX ADMIN — HYDROMORPHONE HYDROCHLORIDE 1 MG: 1 INJECTION, SOLUTION INTRAMUSCULAR; INTRAVENOUS; SUBCUTANEOUS at 15:32

## 2019-04-29 RX ADMIN — AMLODIPINE BESYLATE 5 MG: 5 TABLET ORAL at 08:57

## 2019-04-29 RX ADMIN — HYDROCODONE BITARTRATE AND ACETAMINOPHEN 1 TABLET: 10; 325 TABLET ORAL at 13:48

## 2019-04-29 RX ADMIN — TAZOBACTAM SODIUM AND PIPERACILLIN SODIUM 3.38 G: 375; 3 INJECTION, SOLUTION INTRAVENOUS at 15:51

## 2019-04-29 RX ADMIN — INSULIN LISPRO 8 UNITS: 100 INJECTION, SOLUTION INTRAVENOUS; SUBCUTANEOUS at 03:07

## 2019-04-29 RX ADMIN — Medication 1 CAPSULE: at 07:51

## 2019-04-29 RX ADMIN — INSULIN LISPRO 20 UNITS: 100 INJECTION, SOLUTION INTRAVENOUS; SUBCUTANEOUS at 17:06

## 2019-04-29 RX ADMIN — GABAPENTIN 1200 MG: 400 CAPSULE ORAL at 20:28

## 2019-04-29 RX ADMIN — INSULIN LISPRO 8 UNITS: 100 INJECTION, SOLUTION INTRAVENOUS; SUBCUTANEOUS at 09:27

## 2019-04-29 RX ADMIN — GABAPENTIN 1200 MG: 400 CAPSULE ORAL at 07:51

## 2019-04-29 RX ADMIN — TAMSULOSIN HYDROCHLORIDE 0.4 MG: 0.4 CAPSULE ORAL at 20:28

## 2019-04-29 RX ADMIN — INSULIN LISPRO 3 UNITS: 100 INJECTION, SOLUTION INTRAVENOUS; SUBCUTANEOUS at 17:06

## 2019-04-29 RX ADMIN — DAPTOMYCIN 400 MG: 500 INJECTION, POWDER, LYOPHILIZED, FOR SOLUTION INTRAVENOUS at 12:03

## 2019-04-29 RX ADMIN — TAZOBACTAM SODIUM AND PIPERACILLIN SODIUM 3.38 G: 375; 3 INJECTION, SOLUTION INTRAVENOUS at 20:30

## 2019-04-29 RX ADMIN — INSULIN LISPRO 3 UNITS: 100 INJECTION, SOLUTION INTRAVENOUS; SUBCUTANEOUS at 06:13

## 2019-04-29 RX ADMIN — ENOXAPARIN SODIUM 40 MG: 40 INJECTION SUBCUTANEOUS at 06:13

## 2019-04-30 PROBLEM — R33.8 ACUTE URINARY RETENTION: Status: ACTIVE | Noted: 2019-04-30

## 2019-04-30 LAB
CK SERPL-CCNC: 37 U/L (ref 20–200)
DEPRECATED RDW RBC AUTO: 43.2 FL (ref 37–54)
ERYTHROCYTE [DISTWIDTH] IN BLOOD BY AUTOMATED COUNT: 13.7 % (ref 12.3–15.4)
GLUCOSE BLDC GLUCOMTR-MCNC: 159 MG/DL (ref 70–130)
GLUCOSE BLDC GLUCOMTR-MCNC: 182 MG/DL (ref 70–130)
GLUCOSE BLDC GLUCOMTR-MCNC: 188 MG/DL (ref 70–130)
GLUCOSE BLDC GLUCOMTR-MCNC: 210 MG/DL (ref 70–130)
GLUCOSE BLDC GLUCOMTR-MCNC: 276 MG/DL (ref 70–130)
HCT VFR BLD AUTO: 36 % (ref 37.5–51)
HGB BLD-MCNC: 11.5 G/DL (ref 13–17.7)
MCH RBC QN AUTO: 27.5 PG (ref 26.6–33)
MCHC RBC AUTO-ENTMCNC: 31.9 G/DL (ref 31.5–35.7)
MCV RBC AUTO: 86.1 FL (ref 79–97)
PLATELET # BLD AUTO: 251 10*3/MM3 (ref 140–450)
PMV BLD AUTO: 11.5 FL (ref 6–12)
RBC # BLD AUTO: 4.18 10*6/MM3 (ref 4.14–5.8)
WBC NRBC COR # BLD: 9.76 10*3/MM3 (ref 3.4–10.8)

## 2019-04-30 PROCEDURE — 63710000001 INSULIN LISPRO (HUMAN) PER 5 UNITS: Performed by: INTERNAL MEDICINE

## 2019-04-30 PROCEDURE — 25010000002 HYDRALAZINE PER 20 MG: Performed by: INTERNAL MEDICINE

## 2019-04-30 PROCEDURE — 97110 THERAPEUTIC EXERCISES: CPT

## 2019-04-30 PROCEDURE — 97116 GAIT TRAINING THERAPY: CPT

## 2019-04-30 PROCEDURE — 25010000002 DAPTOMYCIN PER 1 MG: Performed by: INTERNAL MEDICINE

## 2019-04-30 PROCEDURE — 25010000002 PIPERACILLIN SOD-TAZOBACTAM PER 1 G: Performed by: INTERNAL MEDICINE

## 2019-04-30 PROCEDURE — 25010000002 PIPERACILLIN SOD-TAZOBACTAM PER 1 G: Performed by: NURSE PRACTITIONER

## 2019-04-30 PROCEDURE — 99232 SBSQ HOSP IP/OBS MODERATE 35: CPT | Performed by: NURSE PRACTITIONER

## 2019-04-30 PROCEDURE — 82550 ASSAY OF CK (CPK): CPT | Performed by: INTERNAL MEDICINE

## 2019-04-30 PROCEDURE — 25010000002 ENOXAPARIN PER 10 MG: Performed by: NURSE PRACTITIONER

## 2019-04-30 PROCEDURE — 82962 GLUCOSE BLOOD TEST: CPT

## 2019-04-30 PROCEDURE — 85027 COMPLETE CBC AUTOMATED: CPT | Performed by: INTERNAL MEDICINE

## 2019-04-30 PROCEDURE — 63710000001 INSULIN DETEMIR PER 5 UNITS: Performed by: NURSE PRACTITIONER

## 2019-04-30 RX ORDER — AMLODIPINE BESYLATE 5 MG/1
5 TABLET ORAL ONCE
Status: COMPLETED | OUTPATIENT
Start: 2019-04-30 | End: 2019-04-30

## 2019-04-30 RX ORDER — AMLODIPINE BESYLATE 10 MG/1
10 TABLET ORAL
Status: DISCONTINUED | OUTPATIENT
Start: 2019-05-01 | End: 2019-05-03 | Stop reason: HOSPADM

## 2019-04-30 RX ADMIN — ENOXAPARIN SODIUM 40 MG: 40 INJECTION SUBCUTANEOUS at 05:53

## 2019-04-30 RX ADMIN — INSULIN LISPRO 3 UNITS: 100 INJECTION, SOLUTION INTRAVENOUS; SUBCUTANEOUS at 12:52

## 2019-04-30 RX ADMIN — SODIUM CHLORIDE, PRESERVATIVE FREE 3 ML: 5 INJECTION INTRAVENOUS at 20:42

## 2019-04-30 RX ADMIN — METOPROLOL TARTRATE 25 MG: 25 TABLET ORAL at 20:41

## 2019-04-30 RX ADMIN — LISINOPRIL 40 MG: 40 TABLET ORAL at 07:44

## 2019-04-30 RX ADMIN — INSULIN LISPRO 8 UNITS: 100 INJECTION, SOLUTION INTRAVENOUS; SUBCUTANEOUS at 08:08

## 2019-04-30 RX ADMIN — INSULIN DETEMIR 40 UNITS: 100 INJECTION, SOLUTION SUBCUTANEOUS at 11:44

## 2019-04-30 RX ADMIN — INSULIN DETEMIR 40 UNITS: 100 INJECTION, SOLUTION SUBCUTANEOUS at 20:47

## 2019-04-30 RX ADMIN — HYDROCODONE BITARTRATE AND ACETAMINOPHEN 1 TABLET: 10; 325 TABLET ORAL at 18:08

## 2019-04-30 RX ADMIN — INSULIN LISPRO 3 UNITS: 100 INJECTION, SOLUTION INTRAVENOUS; SUBCUTANEOUS at 20:46

## 2019-04-30 RX ADMIN — GABAPENTIN 1200 MG: 400 CAPSULE ORAL at 07:45

## 2019-04-30 RX ADMIN — GABAPENTIN 1200 MG: 400 CAPSULE ORAL at 13:45

## 2019-04-30 RX ADMIN — INSULIN LISPRO 5 UNITS: 100 INJECTION, SOLUTION INTRAVENOUS; SUBCUTANEOUS at 17:58

## 2019-04-30 RX ADMIN — INSULIN LISPRO 20 UNITS: 100 INJECTION, SOLUTION INTRAVENOUS; SUBCUTANEOUS at 12:51

## 2019-04-30 RX ADMIN — AMLODIPINE BESYLATE 5 MG: 5 TABLET ORAL at 11:42

## 2019-04-30 RX ADMIN — TAZOBACTAM SODIUM AND PIPERACILLIN SODIUM 3.38 G: 375; 3 INJECTION, SOLUTION INTRAVENOUS at 13:44

## 2019-04-30 RX ADMIN — DAPTOMYCIN 400 MG: 500 INJECTION, POWDER, LYOPHILIZED, FOR SOLUTION INTRAVENOUS at 11:42

## 2019-04-30 RX ADMIN — INSULIN LISPRO 20 UNITS: 100 INJECTION, SOLUTION INTRAVENOUS; SUBCUTANEOUS at 17:57

## 2019-04-30 RX ADMIN — DULOXETINE HYDROCHLORIDE 30 MG: 30 CAPSULE, DELAYED RELEASE ORAL at 07:44

## 2019-04-30 RX ADMIN — HYDROCODONE BITARTRATE AND ACETAMINOPHEN 1 TABLET: 10; 325 TABLET ORAL at 13:44

## 2019-04-30 RX ADMIN — INSULIN LISPRO 20 UNITS: 100 INJECTION, SOLUTION INTRAVENOUS; SUBCUTANEOUS at 08:07

## 2019-04-30 RX ADMIN — TAZOBACTAM SODIUM AND PIPERACILLIN SODIUM 3.38 G: 375; 3 INJECTION, SOLUTION INTRAVENOUS at 20:42

## 2019-04-30 RX ADMIN — TAZOBACTAM SODIUM AND PIPERACILLIN SODIUM 3.38 G: 375; 3 INJECTION, SOLUTION INTRAVENOUS at 05:53

## 2019-04-30 RX ADMIN — Medication 1 CAPSULE: at 07:44

## 2019-04-30 RX ADMIN — AMLODIPINE BESYLATE 5 MG: 5 TABLET ORAL at 07:44

## 2019-04-30 RX ADMIN — GABAPENTIN 1200 MG: 400 CAPSULE ORAL at 20:41

## 2019-04-30 RX ADMIN — HYDRALAZINE HYDROCHLORIDE 10 MG: 20 INJECTION INTRAMUSCULAR; INTRAVENOUS at 13:43

## 2019-04-30 RX ADMIN — TAMSULOSIN HYDROCHLORIDE 0.4 MG: 0.4 CAPSULE ORAL at 20:41

## 2019-05-01 LAB
BACTERIA SPEC ANAEROBE CULT: NORMAL
GLUCOSE BLDC GLUCOMTR-MCNC: 155 MG/DL (ref 70–130)
GLUCOSE BLDC GLUCOMTR-MCNC: 182 MG/DL (ref 70–130)
GLUCOSE BLDC GLUCOMTR-MCNC: 204 MG/DL (ref 70–130)
GLUCOSE BLDC GLUCOMTR-MCNC: 205 MG/DL (ref 70–130)

## 2019-05-01 PROCEDURE — 25010000002 PIPERACILLIN SOD-TAZOBACTAM PER 1 G: Performed by: INTERNAL MEDICINE

## 2019-05-01 PROCEDURE — 63710000001 INSULIN DETEMIR PER 5 UNITS: Performed by: NURSE PRACTITIONER

## 2019-05-01 PROCEDURE — 63710000001 INSULIN LISPRO (HUMAN) PER 5 UNITS: Performed by: INTERNAL MEDICINE

## 2019-05-01 PROCEDURE — 25010000002 DAPTOMYCIN PER 1 MG: Performed by: INTERNAL MEDICINE

## 2019-05-01 PROCEDURE — 99233 SBSQ HOSP IP/OBS HIGH 50: CPT | Performed by: NURSE PRACTITIONER

## 2019-05-01 PROCEDURE — 25010000002 ENOXAPARIN PER 10 MG: Performed by: NURSE PRACTITIONER

## 2019-05-01 PROCEDURE — 82962 GLUCOSE BLOOD TEST: CPT

## 2019-05-01 RX ORDER — METOPROLOL TARTRATE 50 MG/1
50 TABLET, FILM COATED ORAL EVERY 12 HOURS SCHEDULED
Status: DISCONTINUED | OUTPATIENT
Start: 2019-05-01 | End: 2019-05-02

## 2019-05-01 RX ORDER — TAMSULOSIN HYDROCHLORIDE 0.4 MG/1
0.8 CAPSULE ORAL NIGHTLY
Status: DISCONTINUED | OUTPATIENT
Start: 2019-05-01 | End: 2019-05-03 | Stop reason: HOSPADM

## 2019-05-01 RX ADMIN — INSULIN DETEMIR 40 UNITS: 100 INJECTION, SOLUTION SUBCUTANEOUS at 08:33

## 2019-05-01 RX ADMIN — GABAPENTIN 1200 MG: 400 CAPSULE ORAL at 21:46

## 2019-05-01 RX ADMIN — DAPTOMYCIN 400 MG: 500 INJECTION, POWDER, LYOPHILIZED, FOR SOLUTION INTRAVENOUS at 10:03

## 2019-05-01 RX ADMIN — METOPROLOL TARTRATE 25 MG: 25 TABLET ORAL at 12:26

## 2019-05-01 RX ADMIN — TAZOBACTAM SODIUM AND PIPERACILLIN SODIUM 3.38 G: 375; 3 INJECTION, SOLUTION INTRAVENOUS at 21:46

## 2019-05-01 RX ADMIN — Medication 1 CAPSULE: at 08:08

## 2019-05-01 RX ADMIN — DULOXETINE HYDROCHLORIDE 30 MG: 30 CAPSULE, DELAYED RELEASE ORAL at 08:08

## 2019-05-01 RX ADMIN — INSULIN LISPRO 3 UNITS: 100 INJECTION, SOLUTION INTRAVENOUS; SUBCUTANEOUS at 17:30

## 2019-05-01 RX ADMIN — METOPROLOL TARTRATE 50 MG: 50 TABLET ORAL at 21:46

## 2019-05-01 RX ADMIN — INSULIN LISPRO 20 UNITS: 100 INJECTION, SOLUTION INTRAVENOUS; SUBCUTANEOUS at 08:06

## 2019-05-01 RX ADMIN — GABAPENTIN 1200 MG: 400 CAPSULE ORAL at 16:36

## 2019-05-01 RX ADMIN — TAZOBACTAM SODIUM AND PIPERACILLIN SODIUM 3.38 G: 375; 3 INJECTION, SOLUTION INTRAVENOUS at 14:54

## 2019-05-01 RX ADMIN — TAMSULOSIN HYDROCHLORIDE 0.8 MG: 0.4 CAPSULE ORAL at 21:46

## 2019-05-01 RX ADMIN — LISINOPRIL 40 MG: 40 TABLET ORAL at 08:06

## 2019-05-01 RX ADMIN — INSULIN LISPRO 3 UNITS: 100 INJECTION, SOLUTION INTRAVENOUS; SUBCUTANEOUS at 21:46

## 2019-05-01 RX ADMIN — AMLODIPINE BESYLATE 10 MG: 10 TABLET ORAL at 08:08

## 2019-05-01 RX ADMIN — INSULIN LISPRO 20 UNITS: 100 INJECTION, SOLUTION INTRAVENOUS; SUBCUTANEOUS at 11:37

## 2019-05-01 RX ADMIN — TAZOBACTAM SODIUM AND PIPERACILLIN SODIUM 3.38 G: 375; 3 INJECTION, SOLUTION INTRAVENOUS at 06:18

## 2019-05-01 RX ADMIN — INSULIN DETEMIR 43 UNITS: 100 INJECTION, SOLUTION SUBCUTANEOUS at 21:46

## 2019-05-01 RX ADMIN — SODIUM CHLORIDE, PRESERVATIVE FREE 3 ML: 5 INJECTION INTRAVENOUS at 08:33

## 2019-05-01 RX ADMIN — ENOXAPARIN SODIUM 40 MG: 40 INJECTION SUBCUTANEOUS at 06:18

## 2019-05-01 RX ADMIN — METOPROLOL TARTRATE 25 MG: 25 TABLET ORAL at 08:08

## 2019-05-01 RX ADMIN — HYDROCODONE BITARTRATE AND ACETAMINOPHEN 1 TABLET: 10; 325 TABLET ORAL at 14:34

## 2019-05-01 RX ADMIN — INSULIN LISPRO 5 UNITS: 100 INJECTION, SOLUTION INTRAVENOUS; SUBCUTANEOUS at 11:35

## 2019-05-01 RX ADMIN — INSULIN LISPRO 5 UNITS: 100 INJECTION, SOLUTION INTRAVENOUS; SUBCUTANEOUS at 08:05

## 2019-05-01 RX ADMIN — INSULIN LISPRO 20 UNITS: 100 INJECTION, SOLUTION INTRAVENOUS; SUBCUTANEOUS at 17:31

## 2019-05-01 RX ADMIN — GABAPENTIN 1200 MG: 400 CAPSULE ORAL at 08:08

## 2019-05-01 NOTE — PROGRESS NOTES
[facility] Medicine Services  PROGRESS NOTE    Patient Name: Kendrick Crystal  : 1970  MRN: [id number]    Date of Admission: 2019  Length of Stay: 6  Primary Care Physician: Karri Hawkins MD    Subjective   Subjective     CC:  abd wall abscess     HPI:  Patient returned to bed from restroom. Feels like he is voiding better and urinated >3000ml yesterday on own. Denies pain, n/v, soa, fever    Review of Systems  Gen- No fevers, chills  CV- No chest pain, palpitations, (+) swelling to lower extremities  Resp- No cough, dyspnea  GI- No N/V/D, (-) abdominal pain    Otherwise ROS is negative except as mentioned in the HPI.    Objective   Objective     Vital Signs:   Temp:  [97.7 °F (36.5 °C)-97.9 °F (36.6 °C)] 97.7 °F (36.5 °C)  Heart Rate:  [83-98] 83  Resp:  [16-18] 16  BP: (159-188)/() 168/102        Physical Exam:  Constitutional: No acute distress, awake, alert- sitting on bedside  HENT: NCAT, mucous membranes moist  Respiratory: clear to auscultation upper, lower lobes diminished, expansion equal bilaterally   Cardiovascular: RRR, no murmurs, rubs, or gallops, palpable pedal pulses bilaterally  Gastrointestinal: Positive bowel sounds, soft, tender to palpation, nondistended  Musculoskeletal: +1 edema RLE; L BKA   Psychiatric: Appropriate affect, cooperative  Neurologic: Oriented x 3, strength symmetric in all extremities, speech clear, no focal deficits  Skin: large abdominal wound to LLQ, dressing dry and intact, minimal erythema surrounding open wound with packing intact. RLE intact/ clean      Results Reviewed:  I have personally reviewed current lab, radiology, and data and agree.    Results from last 7 days   Lab Units 19  0453 19  0503 19  0435  19  0928   WBC 10*3/mm3 9.76 11.76* 13.30*   < >  --    HEMOGLOBIN g/dL 11.5* 11.3* 11.5*   < >  --    HEMATOCRIT % 36.0* 34.6* 35.7*   < >  --    PLATELETS 10*3/mm3 251 255 236   < >  --    INR    --   --   --   --  1.13    < > = values in this interval not displayed.     Results from last 7 days   Lab Units 04/29/19  0503 04/28/19  0435 04/27/19  0458  04/26/19  0344  04/24/19 2219   SODIUM mmol/L 136 132* 133*  --  133*   < > 128*   POTASSIUM mmol/L 4.3 4.4 4.3   < > 3.4*   < > 4.2   CHLORIDE mmol/L 100 99 99  --  97*   < > 90*   CO2 mmol/L 26.0 23.0 21.0*  --  23.0   < > 24.0   BUN mg/dL 28* 28* 19  --  16   < > 15   CREATININE mg/dL 0.59* 0.68* 0.70*  --  0.63*   < > 0.75*   GLUCOSE mg/dL 223* 372* 330*  --  234*   < > 551*   CALCIUM mg/dL 8.4* 8.0* 8.5*  --  8.3*   < > 8.4*   ALT (SGPT) U/L  --  17  --   --  15  --  15   AST (SGOT) U/L  --  13  --   --  17  --  13    < > = values in this interval not displayed.     Estimated Creatinine Clearance: 199.8 mL/min (A) (by C-G formula based on SCr of 0.59 mg/dL (L)).    No results found for: BNP    Microbiology Results Abnormal     Procedure Component Value - Date/Time    Wound Culture - Drainage, Abdominal Wall [[id number]]  (Abnormal) Collected:  04/24/19 2219    Lab Status:  Preliminary result Specimen:  Drainage from Abdominal Wall Updated:  05/01/19 1025     Wound Culture Moderate growth (3+) Mixed Gram Positive Haily     Gram Stain Many (4+) WBCs seen      Moderate (3+) Gram positive cocci in pairs and chains    Blood Culture - Blood, Hand, Left [[id number]] Collected:  04/24/19 2215    Lab Status:  Final result Specimen:  Blood from Hand, Left Updated:  04/29/19 2245     Blood Culture No growth at 5 days    Blood Culture - Blood, Arm, Left [[id number]] Collected:  04/24/19 2200    Lab Status:  Final result Specimen:  Blood from Arm, Left Updated:  04/29/19 2245     Blood Culture No growth at 5 days    Anaerobic Culture - Wound, Abdominal Wall [[id number]] Collected:  04/26/19 1350    Lab Status:  Preliminary result Specimen:  Wound from Abdominal Wall Updated:  04/29/19 1228     Anaerobic Culture No anaerobes isolated at 3 days    Wound Culture - Wound,  Abdominal Wall [[id number]]  (Abnormal) Collected:  04/26/19 1350    Lab Status:  Final result Specimen:  Wound from Abdominal Wall Updated:  04/28/19 0821     Wound Culture Light growth (2+) Lactobacillus species     Comment: Lactobacillus species is a component of normal human sherly.  Clindamycin, Penicillin, and Ampicillin generally are the most active in vitro agents.  Resistance to Vancomycin and Aminoglycosides is reported.          Gram Stain Moderate (3+) Gram positive cocci in pairs      Moderate (3+) Gram variable bacilli      Moderate (3+) WBCs seen    AFB Culture - Wound, Abdominal Wall [[id number]] Collected:  04/26/19 1350    Lab Status:  Preliminary result Specimen:  Wound from Abdominal Wall Updated:  04/27/19 1051     AFB Stain No acid fast bacilli seen on concentrated smear          Imaging Results (last 24 hours)     ** No results found for the last 24 hours. **          Results for orders placed during the hospital encounter of 02/28/17   Echocardiogram 2D complete    Narrative · Left ventricular function is normal. Estimated EF = 65%.  · Left ventricular wall thickness is consistent with mild concentric   hypertrophy.  · All left ventricular wall segments contract normally.          I have reviewed the medications:    Current Facility-Administered Medications:   •  acetaminophen (TYLENOL) tablet 650 mg, 650 mg, Oral, Q4H PRN, Imelda Paige, APRN  •  amLODIPine (NORVASC) tablet 10 mg, 10 mg, Oral, Q24H, Mami Luz APRN, 10 mg at 05/01/19 0808  •  DAPTOmycin (CUBICIN) 400 mg in sodium chloride 0.9 % 50 mL IVPB, 4 mg/kg (Adjusted), Intravenous, Q24H, Usman Dong MD, Last Rate: 100 mL/hr at 05/01/19 1003, 400 mg at 05/01/19 1003  •  dextrose (D50W) 25 g/ 50mL Intravenous Solution 25 g, 25 g, Intravenous, Q15 Min PRN, Imelda Paige, APRN  •  dextrose (GLUTOSE) oral gel 15 g, 15 g, Oral, Q15 Min PRN, Imelda Paige, APRN  •  DULoxetine (CYMBALTA) DR capsule 30 mg, 30 mg, Oral,  Daily, Imelda Paige APRN, 30 mg at 05/01/19 0808  •  enoxaparin (LOVENOX) syringe 40 mg, 40 mg, Subcutaneous, Q24H, Imelda Paige APRN, 40 mg at 05/01/19 0618  •  gabapentin (NEURONTIN) capsule 1,200 mg, 1,200 mg, Oral, TID, Martina Mario MD, 1,200 mg at 05/01/19 0808  •  glucagon (human recombinant) (GLUCAGEN DIAGNOSTIC) injection 1 mg, 1 mg, Subcutaneous, PRN, Imelda Paige APRN  •  hydrALAZINE (APRESOLINE) injection 10 mg, 10 mg, Intravenous, Q4H PRN, Radames Alberts MD, 10 mg at 04/30/19 1343  •  HYDROcodone-acetaminophen (NORCO)  MG per tablet 1 tablet, 1 tablet, Oral, TID PRN, Martina Mario MD, 1 tablet at 04/30/19 1808  •  HYDROmorphone (DILAUDID) injection 1 mg, 1 mg, Intravenous, Q12H PRN, Fadi Kern MD, 1 mg at 04/29/19 1532  •  insulin detemir (LEVEMIR) injection 43 Units, 43 Units, Subcutaneous, Q12H, Mami Luz APRN  •  insulin lispro (humaLOG) injection 0-14 Units, 0-14 Units, Subcutaneous, 4x Daily With Meals & Nightly, Radames Alberts MD, 5 Units at 05/01/19 0805  •  insulin lispro (humaLOG) injection 20 Units, 20 Units, Subcutaneous, TID With Meals, Radames Alberts MD, 20 Units at 05/01/19 0806  •  lactobacillus acidophilus (RISAQUAD) capsule 1 capsule, 1 capsule, Oral, Daily, Imelda Paige APRN, 1 capsule at 05/01/19 0808  •  lisinopril (PRINIVIL,ZESTRIL) tablet 40 mg, 40 mg, Oral, Daily, Imelda Paige APRN, 40 mg at 05/01/19 0806  •  magnesium sulfate 4 gram infusion - Mg less than or equal to 1mg/dL, 4 g, Intravenous, PRN **OR** magnesium sulfate 3 gram infusion (1gm x 3) - Mg 1.1 - 1.5 mg/dL, 1 g, Intravenous, PRN **OR** Magnesium Sulfate 2 gram infusion- Mg 1.6 - 1.9 mg/dL, 2 g, Intravenous, PRN, Radames Alberts MD, Last Rate: 25 mL/hr at 04/26/19 0958, 2 g at 04/26/19 0958  •  metoprolol tartrate (LOPRESSOR) tablet 25 mg, 25 mg, Oral, Once, Mami Luz, APRN  •  metoprolol tartrate (LOPRESSOR) tablet 50 mg, 50 mg,  Oral, Q12H, Mami Luz, APRN  •  piperacillin-tazobactam (ZOSYN) 3.375 g in iso-osmotic dextrose 50 ml (premix), 3.375 g, Intravenous, Q8H, Usman Dong MD, 3.375 g at 05/01/19 0618  •  potassium chloride (MICRO-K) CR capsule 40 mEq, 40 mEq, Oral, PRN **OR** potassium chloride (KLOR-CON) packet 40 mEq, 40 mEq, Oral, PRN **OR** potassium chloride 10 mEq in 100 mL IVPB, 10 mEq, Intravenous, Q1H PRN, Radames Alberts MD, Stopped at 04/26/19 1004  •  sodium chloride 0.9 % flush 3 mL, 3 mL, Intravenous, Q12H, Imelda Paige APRN, 3 mL at 05/01/19 0833  •  sodium chloride 0.9 % flush 3-10 mL, 3-10 mL, Intravenous, PRN, Imelda Paige, APRN  •  tamsulosin (FLOMAX) 24 hr capsule 0.8 mg, 0.8 mg, Oral, Nightly, Mami Luz, APRN      Assessment/Plan   Assessment / Plan     Active Hospital Problems    Diagnosis POA   • **Sepsis affecting skin (CMS/HCC) [A41.9] Yes   • Acute urinary retention [R33.8] Unknown   • DM (diabetes mellitus) type II uncontrolled, periph vascular disorder (CMS/HCC) [E11.51, E11.65] Yes   • Abdominal wall abscess [L02.211] Yes   • S/P BKA (below knee amputation), left (CMS/HCC) [Z89.512] Not Applicable   • Hyponatremia [E87.1] Yes   • NÚÑEZ Cirrhosis [K74.60] Yes          Brief Hospital Course to date:  Kendrick Crystal is a 48 y.o. male w/ dm, previous left bka, núñez cirrhosis who ran out of his insulin ~6-8 weeks ago. Developed lesion on anterior abdominal well which worsened and became tender, red and ultimately presented to [facility] ED for further evaluation.       Plan:  - s/p debridement on 4/26/19. Continue post op wound care per Dr. Kern. Dapto and Zosyn per Dr. Dong. Wound cultures + lactobacillus     - A1c >14. Non compliant with insulin at home. Increase levemir to 43 units bid. Reasonably well controlled now     -s/p diabetes educator consultation.     - continue lisinopril and increased norvasc. Increase metoprolol to 50mg bid today. Prn  hydralazine    - urology following for acute retention. Has improved on flomax, but PVR today 495ml per RN report. Continues to refuse I/o cath at this time. Spontaneous output of >3000ml yesterday. Increase flomax today and monitor     *ot recommending inpt rehab, patient agreeable (message left w/ c.m.). To rehab once final abx plan in place and ok w/ dr. nagy      DVT Prophylaxis:  Sq lovenox     Disposition: I expect the patient to be discharged to rehab when bed available      CODE STATUS:   Code Status and Medical Interventions:   Ordered at: 04/25/19 0139     Code Status:    CPR     Medical Interventions (Level of Support Prior to Arrest):    Full         Electronically signed by CHINTAN Gannon, 05/01/19, 10:48 AM.

## 2019-05-01 NOTE — PLAN OF CARE
Problem: Patient Care Overview  Goal: Plan of Care Review  Outcome: Ongoing (interventions implemented as appropriate)   05/01/19 1817   Coping/Psychosocial   Plan of Care Reviewed With patient   Plan of Care Review   Progress no change   OTHER   Outcome Summary BP elevated this AM, MD notified. No c/o pain. Dressings changed this shift. Adequate UOP. Post risidual void still present, pt refusing I&O cath. Will continue to monitor.      Goal: Individualization and Mutuality  Outcome: Outcome(s) achieved Date Met: 05/01/19 05/01/19 1817   Individualization   Patient Specific Preferences Pt likes wife at bedside   Patient Specific Goals (Include Timeframe) Pt wants to go to some type of rehab at d/c   Patient Specific Interventions Work with therapy, compliance with dressing changes   Mutuality/Individual Preferences   What Anxieties, Fears, Concerns, or Questions Do You Have About Your Care? N/A   What Information Would Help Us Give You More Personalized Care? N/A   How Would You and/or Your Support Person Like to Participate in Your Care? N/A   Mutuality/Individual Preferences   How to Address Anxieties/Fears N/A       Problem: Skin Injury Risk (Adult)  Intervention: Prevent/Manage Excess Moisture   05/01/19 1048   Skin Interventions   Skin Protection adhesive use limited;tubing/devices free from skin contact     Intervention: Maintain Head of Bed Elevation Less Than 30 Degrees as Tolerated   05/01/19 1405   Positioning   Head of Bed (HOB) HOB elevated     Intervention: Prevent/Minimize Shear/Friction Injuries   05/01/19 1048 05/01/19 1405   Skin Interventions   Pressure Reduction Devices specialty bed utilized --    Positioning   Positioning/Transfer Devices --  pillows;in use     Intervention: Prevent or Minimize Pressure   05/01/19 1048 05/01/19 1405   Skin Interventions   Pressure Reduction Techniques frequent weight shift encouraged;positioned off wounds --    Positioning   Body Position --  weight shift  assistance provided       Goal: Skin Health and Integrity  Outcome: Ongoing (interventions implemented as appropriate)   05/01/19 1817   Skin Injury Risk (Adult)   Skin Health and Integrity making progress toward outcome       Problem: Infection, Risk/Actual (Adult)  Goal: Infection Prevention/Resolution  Outcome: Ongoing (interventions implemented as appropriate)   05/01/19 1817   Infection, Risk/Actual (Adult)   Infection Prevention/Resolution making progress toward outcome

## 2019-05-01 NOTE — PLAN OF CARE
Problem: Patient Care Overview  Goal: Plan of Care Review   05/01/19 0421   Coping/Psychosocial   Plan of Care Reviewed With patient;spouse   Plan of Care Review   Progress improving   OTHER   Outcome Summary VSS, no c/o pain. Pt ambulating with walker. UOP still low with post void residuals, pt refuses in and out cath. Will continue to monitor.       Problem: Skin Injury Risk (Adult)  Goal: Skin Health and Integrity  Outcome: Ongoing (interventions implemented as appropriate)      Problem: Diabetes, Type 2 (Adult)  Goal: Signs and Symptoms of Listed Potential Problems Will be Absent, Minimized or Managed (Diabetes, Type 2)  Outcome: Ongoing (interventions implemented as appropriate)      Problem: Infection, Risk/Actual (Adult)  Goal: Identify Related Risk Factors and Signs and Symptoms  Outcome: Outcome(s) achieved Date Met: 05/01/19    Goal: Infection Prevention/Resolution  Outcome: Ongoing (interventions implemented as appropriate)      Problem: Surgery Nonspecified (Adult)  Goal: Signs and Symptoms of Listed Potential Problems Will be Absent, Minimized or Managed (Surgery Nonspecified)  Outcome: Ongoing (interventions implemented as appropriate)    Goal: Anesthesia/Sedation Recovery  Outcome: Outcome(s) achieved Date Met: 05/01/19

## 2019-05-01 NOTE — PLAN OF CARE
Problem: Patient Care Overview  Goal: Plan of Care Review  Outcome: Ongoing (interventions implemented as appropriate)   05/01/19 1040   Coping/Psychosocial   Plan of Care Reviewed With patient;family   Plan of Care Review   Progress improving   OTHER   Outcome Summary Patient follow up for wounds to left abd, right anterior leg and left stump-left abdominal wound care orders per Dr. Kern. Right anterior leg ulceration yellow/pink/moist-continue with thera honey. Per patient left stump yeast is much improved. Patient on low air loss bed-no other concerns at this time. WOC will continue to follow-please notify for questions or concerns.

## 2019-05-01 NOTE — PROGRESS NOTES
[facility] Progress Note        Antibiotics:  Anti-Infectives (From admission, onward)    Ordered     Dose/Rate Route Frequency Start Stop    19 0855  DAPTOmycin (CUBICIN) 400 mg in sodium chloride 0.9 % 50 mL IVPB     Usman Dong MD reviewed the order on 19.   Ordering Provider:  Usman Dong MD    4 mg/kg × 94.3 kg (Adjusted)  100 mL/hr over 30 Minutes Intravenous Every 24 Hours Scheduled 19 1000 19 0859    19 0402  piperacillin-tazobactam (ZOSYN) 3.375 g in iso-osmotic dextrose 50 ml (premix)     Comments:  First dose given in ED,please time from that dose   Usman Dong MD reviewed the order on 19.   Ordering Provider:  Usman Dong MD    3.375 g  over 4 Hours Intravenous Every 8 Hours 19 0600 19 0559    19 2202  vancomycin 2250 mg/500 mL 0.9% NS IVPB ([facility])     Ordering Provider:  Coco Dietrich PA-C    20 mg/kg × 109 kg  200 mL/hr over 2.5 Hours Intravenous Once 19 2204 19 0140    19 2202  piperacillin-tazobactam (ZOSYN) 3.375 g in iso-osmotic dextrose 50 ml (premix)     Ordering Provider:  Coco Dietrich PA-C    3.375 g  over 30 Minutes Intravenous Once 19 2204 19 2254          CC: abd wall pain    HPI:    Patient is a 48 y.o.  Yr old male with history of h/o poorly contolled T2DM, DUNLAP/liver cirrhosis, HTN, PVD/Left BKA, and multiple skin abscesses/MRSA who presented to [facility] ED with right shin wound infection summer 2018.  He was unable to get to see Dr. Martinez as his father passed away unexpectedly on 18 and he had to wait until after the .  The wound worsened becoming gangrenous and he came to [facility] ED in 2018.  He also had been hospitalized at [facility] and then transferred to  for a severe penis/scrotum infection requiring surgical debridement in summer 2018.  He received antibiotics regarding his right leg infection, generally improved over the  "remainder of summer 2018 but that area had not completely healed.     He is admitted April 24, 2019 with acute left lower abdominal wall redness/swelling and pain, spontaneous drainage associated with fever/chills and uncontrolled blood sugars.     4/26 I&D requiring wide debridement     5/1/19 doing ok in general,  Pain is generally constant, sharp, nonradiating, worse with palpation, generally better with pain meds although not completely relieved and currently 4 out of 10.       No diarrhea.  No headache photophobia or neck stiffness.  No shortness of breath cough or hemoptysis.  No nausea vomiting or abdominal pain.  No dysuria hematuria or pyuria.     No  new redness swelling or pain at his right anterior shin. Small nonhealed area but no new deterioration         ROS:      5/1/19 No f/c/s. No n/v/d. No rash. No new ADR to Abx.     PE:   /92 (BP Location: Right arm, Patient Position: Lying)   Pulse 95   Temp 97.9 °F (36.6 °C) (Oral)   Resp 18   Ht 190.5 cm (75\")   Wt 109 kg (240 lb)   SpO2 95%   BMI 30.00 kg/m²       GENERAL: awake, in no acute distress.   HEENT: Normocephalic, atraumatic.  PERRL. EOMI. No conjunctival injection. No icterus. Oropharynx clear without evidence of thrush or exudate. No evidence of peridontal disease.    NECK: Supple without nuchal rigidity. No mass.  LYMPH: No cervical, axillary or inguinal lymphadenopathy.  HEART: RRR; No murmur, rubs, gallops.   LUNGS: Clear to auscultation bilaterally without wheezing, rales, rhonchi. Normal respiratory effort. Nonlabored. No dullness.  ABDOMEN: Soft, tender left lower quadrant near skin abnormality as outlined below, nondistended. Positive bowel sounds. No rebound or guarding. NO mass or HSM.  EXT:  No cyanosis, clubbing or edema. No cord.  : Genitalia generally unremarkable.  Without Walker catheter.  MSK: FROM without joint effusions noted arms/legs.    SKIN: Warm and dry without cutaneous eruptions on Inspection/palpation.  "   NEURO: awake     Anterior abdominal wall,large surgical wound noted  and large area of redness/warmth and induration.  No discrete fluctuance.  No crepitus or bulla.  No dermatomal pattern.     No peripheral stigmata/phenomena of endocarditis     Right lower leg with nonhealed area anterior shin through epidermis but no probe to bone tendon joint or ligament.  No redness induration or warmth.  No mass bulge or fluctuance.  No crepitus or bulla.  Some crust           Laboratory Data    Results from last 7 days   Lab Units 04/30/19  0453 04/29/19  0503 04/28/19  0435   WBC 10*3/mm3 9.76 11.76* 13.30*   HEMOGLOBIN g/dL 11.5* 11.3* 11.5*   HEMATOCRIT % 36.0* 34.6* 35.7*   PLATELETS 10*3/mm3 251 255 236     Results from last 7 days   Lab Units 04/29/19  0503   SODIUM mmol/L 136   POTASSIUM mmol/L 4.3   CHLORIDE mmol/L 100   CO2 mmol/L 26.0   BUN mg/dL 28*   CREATININE mg/dL 0.59*   GLUCOSE mg/dL 223*   CALCIUM mg/dL 8.4*     Results from last 7 days   Lab Units 04/28/19  0435   ALK PHOS U/L 146*   BILIRUBIN mg/dL 0.2   ALT (SGPT) U/L 17   AST (SGOT) U/L 13               Estimated Creatinine Clearance: 199.8 mL/min (A) (by C-G formula based on SCr of 0.59 mg/dL (L)).      Microbiology:      Radiology:  Imaging Results (last 72 hours)     Procedure Component Value Units Date/Time    CT Abdomen Pelvis With Contrast [[id number]] Collected:  04/24/19 2340     Updated:  04/24/19 2342    Narrative:       CT Abdomen Pelvis W    INDICATION:   Left lower quadrant abdominal wall swelling and redness for the past week    TECHNIQUE:   CT of the abdomen and pelvis with 95 cc of Isovue-300 contrast. Coronal and sagittal reconstructions were obtained.  Radiation dose reduction techniques included automated exposure control or exposure modulation based on body size. Count of known CT and  cardiac nuc med studies performed in previous 12 months: 1.     COMPARISON:   7/30/2018    FINDINGS:  Abdomen: No upper abdominal solid organ  abnormalities are noted. Again noted incidentally is a left adrenal adenoma that is unchanged. There is no evidence of retroperitoneal adenopathy. There are no distended bowel loops. The appendix is normal.    Pelvis: Mild skin thickening is noted with subcutaneous edema seen in the left lower quadrant. The left groin inflammatory changes extend a little bit deeper. There is a more consolidated area of subcutaneous inflammation at the left groin measuring 2.5  cm in diameter. There is no evidence of a drainable abscess. Left inguinal lymph nodes are mildly prominent and are likely reactive. No adenopathy is seen in the pelvis itself.      Impression:       Cellulitis of the left lower quadrant abdominal wall with more focal inflammatory change at the left groin. No drainable abscess is seen. There are reactive lymph nodes in the left inguinal region.          Signer Name: El Reyes MD   Signed: 4/24/2019 11:40 PM   Workstation Name: [id number]               Impression:      --Acute anterior abdominal wall, left lower quadrant cellulitis/abscess s/p I&D 4/26 requiring wide debridement;  D/w Dr Kern.  Surgery following to determine timing/options/threshold for further surgical debridement.  This is further complicated by uncontrolled diabetes and past history of prior severe soft tissue infections.  Broad antimicrobials ongoing empirically with daptomycin/Zosyn.  Taper once further microbiologic data     --Diabetes mellitus type 2, uncontrolled.  You need to tightly control blood sugar to give best chance for healing     --History right lower leg infection.  Chronic nonhealed area anterior shin with ongoing skin care.  No obvious secondary infection at present.  Monitor.  Any specific timing/option or threshold for further vascular work-up per vascular team at their discretion     --History MRSA     --History DUNLAP and chronic liver disease per past notes     --Prior left BKA     --Peripheral vascular disease.   "Chronic and current extent/severity unclear and would need to be clarified by vascular team at their discretion           PLAN:     --IV daptomycin/Zosyn;  Waiting on culture data/clinical course to consider tapering     --Check/review labs cultures and scans     --Highly complex set of issues with high risk for further serious morbidity and other serious sequela     --Discussed with microbiology and history per nursing staff; surgical team following    **microbiology is working up cultures, originally reported by them as \"mixed gram positive sherly\";  I have asked them to evaluate for S Aureus/pathogenic strep/enterococcus -v-  gm+ skin sherly/CN staph etc..    --d/w Dr Nila Dong MD  5/1/2019        "

## 2019-05-02 LAB
GLUCOSE BLDC GLUCOMTR-MCNC: 174 MG/DL (ref 70–130)
GLUCOSE BLDC GLUCOMTR-MCNC: 205 MG/DL (ref 70–130)
GLUCOSE BLDC GLUCOMTR-MCNC: 235 MG/DL (ref 70–130)
GLUCOSE BLDC GLUCOMTR-MCNC: 316 MG/DL (ref 70–130)

## 2019-05-02 PROCEDURE — 82962 GLUCOSE BLOOD TEST: CPT

## 2019-05-02 PROCEDURE — 05HY33Z INSERTION OF INFUSION DEVICE INTO UPPER VEIN, PERCUTANEOUS APPROACH: ICD-10-PCS | Performed by: FAMILY MEDICINE

## 2019-05-02 PROCEDURE — 97116 GAIT TRAINING THERAPY: CPT | Performed by: PHYSICAL THERAPIST

## 2019-05-02 PROCEDURE — 63710000001 INSULIN DETEMIR PER 5 UNITS: Performed by: NURSE PRACTITIONER

## 2019-05-02 PROCEDURE — C1894 INTRO/SHEATH, NON-LASER: HCPCS

## 2019-05-02 PROCEDURE — C1751 CATH, INF, PER/CENT/MIDLINE: HCPCS

## 2019-05-02 PROCEDURE — 25010000002 DAPTOMYCIN PER 1 MG: Performed by: INTERNAL MEDICINE

## 2019-05-02 PROCEDURE — 25010000002 ENOXAPARIN PER 10 MG: Performed by: NURSE PRACTITIONER

## 2019-05-02 PROCEDURE — 63710000001 INSULIN LISPRO (HUMAN) PER 5 UNITS: Performed by: INTERNAL MEDICINE

## 2019-05-02 PROCEDURE — 25010000002 PIPERACILLIN SOD-TAZOBACTAM PER 1 G: Performed by: INTERNAL MEDICINE

## 2019-05-02 PROCEDURE — 97110 THERAPEUTIC EXERCISES: CPT | Performed by: PHYSICAL THERAPIST

## 2019-05-02 PROCEDURE — 99232 SBSQ HOSP IP/OBS MODERATE 35: CPT | Performed by: NURSE PRACTITIONER

## 2019-05-02 RX ORDER — SODIUM CHLORIDE 0.9 % (FLUSH) 0.9 %
10 SYRINGE (ML) INJECTION AS NEEDED
Status: DISCONTINUED | OUTPATIENT
Start: 2019-05-02 | End: 2019-05-03 | Stop reason: HOSPADM

## 2019-05-02 RX ORDER — SODIUM CHLORIDE 0.9 % (FLUSH) 0.9 %
10 SYRINGE (ML) INJECTION EVERY 12 HOURS SCHEDULED
Status: DISCONTINUED | OUTPATIENT
Start: 2019-05-02 | End: 2019-05-03 | Stop reason: HOSPADM

## 2019-05-02 RX ORDER — SODIUM CHLORIDE 0.9 % (FLUSH) 0.9 %
20 SYRINGE (ML) INJECTION AS NEEDED
Status: DISCONTINUED | OUTPATIENT
Start: 2019-05-02 | End: 2019-05-03 | Stop reason: HOSPADM

## 2019-05-02 RX ADMIN — INSULIN DETEMIR 46 UNITS: 100 INJECTION, SOLUTION SUBCUTANEOUS at 20:51

## 2019-05-02 RX ADMIN — LISINOPRIL 40 MG: 40 TABLET ORAL at 08:26

## 2019-05-02 RX ADMIN — METOPROLOL TARTRATE 25 MG: 25 TABLET ORAL at 10:26

## 2019-05-02 RX ADMIN — INSULIN DETEMIR 43 UNITS: 100 INJECTION, SOLUTION SUBCUTANEOUS at 08:35

## 2019-05-02 RX ADMIN — INSULIN LISPRO 10 UNITS: 100 INJECTION, SOLUTION INTRAVENOUS; SUBCUTANEOUS at 08:27

## 2019-05-02 RX ADMIN — INSULIN LISPRO 5 UNITS: 100 INJECTION, SOLUTION INTRAVENOUS; SUBCUTANEOUS at 20:51

## 2019-05-02 RX ADMIN — HYDROCODONE BITARTRATE AND ACETAMINOPHEN 1 TABLET: 10; 325 TABLET ORAL at 16:10

## 2019-05-02 RX ADMIN — Medication 1 CAPSULE: at 08:26

## 2019-05-02 RX ADMIN — TAMSULOSIN HYDROCHLORIDE 0.8 MG: 0.4 CAPSULE ORAL at 20:20

## 2019-05-02 RX ADMIN — INSULIN LISPRO 20 UNITS: 100 INJECTION, SOLUTION INTRAVENOUS; SUBCUTANEOUS at 11:47

## 2019-05-02 RX ADMIN — INSULIN LISPRO 20 UNITS: 100 INJECTION, SOLUTION INTRAVENOUS; SUBCUTANEOUS at 17:54

## 2019-05-02 RX ADMIN — GABAPENTIN 1200 MG: 400 CAPSULE ORAL at 08:26

## 2019-05-02 RX ADMIN — DAPTOMYCIN 400 MG: 500 INJECTION, POWDER, LYOPHILIZED, FOR SOLUTION INTRAVENOUS at 10:20

## 2019-05-02 RX ADMIN — GABAPENTIN 1200 MG: 400 CAPSULE ORAL at 16:10

## 2019-05-02 RX ADMIN — METOPROLOL TARTRATE 75 MG: 50 TABLET ORAL at 20:20

## 2019-05-02 RX ADMIN — GABAPENTIN 1200 MG: 400 CAPSULE ORAL at 20:20

## 2019-05-02 RX ADMIN — AMLODIPINE BESYLATE 10 MG: 10 TABLET ORAL at 08:26

## 2019-05-02 RX ADMIN — DULOXETINE HYDROCHLORIDE 30 MG: 30 CAPSULE, DELAYED RELEASE ORAL at 08:27

## 2019-05-02 RX ADMIN — INSULIN LISPRO 20 UNITS: 100 INJECTION, SOLUTION INTRAVENOUS; SUBCUTANEOUS at 08:27

## 2019-05-02 RX ADMIN — METOPROLOL TARTRATE 50 MG: 50 TABLET ORAL at 08:26

## 2019-05-02 RX ADMIN — INSULIN LISPRO 3 UNITS: 100 INJECTION, SOLUTION INTRAVENOUS; SUBCUTANEOUS at 17:54

## 2019-05-02 RX ADMIN — TAZOBACTAM SODIUM AND PIPERACILLIN SODIUM 3.38 G: 375; 3 INJECTION, SOLUTION INTRAVENOUS at 20:51

## 2019-05-02 RX ADMIN — ENOXAPARIN SODIUM 40 MG: 40 INJECTION SUBCUTANEOUS at 05:46

## 2019-05-02 RX ADMIN — INSULIN LISPRO 5 UNITS: 100 INJECTION, SOLUTION INTRAVENOUS; SUBCUTANEOUS at 11:47

## 2019-05-02 RX ADMIN — TAZOBACTAM SODIUM AND PIPERACILLIN SODIUM 3.38 G: 375; 3 INJECTION, SOLUTION INTRAVENOUS at 05:46

## 2019-05-02 RX ADMIN — TAZOBACTAM SODIUM AND PIPERACILLIN SODIUM 3.38 G: 375; 3 INJECTION, SOLUTION INTRAVENOUS at 16:10

## 2019-05-02 NOTE — THERAPY TREATMENT NOTE
Acute Care - Physical Therapy Treatment Note  [facility]     Patient Name: Kendrick Crystal  : 1970  MRN: [id number]  Today's Date: 2019  Onset of Illness/Injury or Date of Surgery: 19  Date of Referral to PT: 19  Referring Physician: MD Aristeo    Admit Date: 2019    Visit Dx:    ICD-10-CM ICD-9-CM   1. Cellulitis of abdominal wall L03.311 682.2   2. Type 2 diabetes mellitus with hyperglycemia, with long-term current use of insulin (CMS/[facility]) E11.65 250.00    Z79.4 790.29     V58.67   3. History of MRSA infection Z86.14 V12.04   4. Leukocytosis, unspecified type D72.829 288.60   5. History of hypertension Z86.79 V12.59   6. Peripheral vascular disease (CMS/[facility]) I73.9 443.9   7. Abdominal wall abscess L02.211 682.2   8. Impaired mobility and ADLs Z74.09 799.89   9. Impaired functional mobility, balance, gait, and endurance Z74.09 V49.89     Patient Active Problem List   Diagnosis   • DUNLAP Cirrhosis   • Essential hypertension   • Non-compliance   • Hyperlipemia   • Hypertriglyceridemia, essential   • Hyponatremia   • Sepsis affecting skin (CMS/[facility])   • Wound infection of left leg   • Type 2 diabetes mellitus with circulatory disorder and uncontrolled   • Dysthymia   • History of MRSA infection   • Diabetic ulcer of right lower leg (CMS/[facility])   • Cellulitis of right anterior lower leg   • JUAN (acute kidney injury) (CMS/[facility])   • S/P BKA (below knee amputation), left (CMS/[facility])   • Peripheral vascular disease (CMS/[facility])   • DM (diabetes mellitus) type II uncontrolled, periph vascular disorder (CMS/[facility])   • Abdominal wall abscess   • Acute urinary retention       Therapy Treatment    Rehabilitation Treatment Summary     Row Name 19 1523             Treatment Time/Intention    Discipline  physical therapist  -LM      Document Type  therapy note (daily note)  -LM      Subjective Information  complains of;fatigue  -LM      Mode of Treatment  individual therapy;physical therapy  -LM       Patient/Family Observations  Pt sleeping in bed.  IV intact.  R PICC just placed but not yet hooked up.  Spouse present.  -LM      Care Plan Review  care plan/treatment goals reviewed;risks/benefits reviewed;patient/other agree to care plan  -LM2      Patient Effort  good  -LM2      Existing Precautions/Restrictions  fall;oxygen therapy device and L/min;other (see comments) L BKA with prosthesis  -LM2      Recorded by [LM] Louann Lainez, PT 05/02/19 1558  [LM2] Louann Lainez, PT 05/02/19 1613      Row Name 05/02/19 1523             Vital Signs    Pre Systolic BP Rehab  -- VSS - RN cleared for tx  -LM      Pre Patient Position  Supine  -LM      Intra Patient Position  Standing  -LM      Post Patient Position  Sitting  -LM      Recorded by [LM] Louann Lainez, PT 05/02/19 1613      Row Name 05/02/19 1523             Cognitive Assessment/Intervention    Additional Documentation  Cognitive Assessment/Intervention (Group)  -LM      Recorded by [LM] Louann Lainez, PT 05/02/19 1613      Row Name 05/02/19 1523             Cognitive Assessment/Intervention- PT/OT    Affect/Mental Status (Cognitive)  WFL  -LM      Orientation Status (Cognition)  oriented x 3  -LM      Follows Commands (Cognition)  WFL  -LM      Cognitive Function (Cognitive)  safety deficit  -LM      Safety Deficit (Cognitive)  mild deficit;safety precautions awareness;safety precautions follow-through/compliance  -LM      Personal Safety Interventions  fall prevention program maintained;gait belt;muscle strengthening facilitated;nonskid shoes/slippers when out of bed  -LM      Recorded by [LM] Louann Lainez, PT 05/02/19 1613      Row Name 05/02/19 1523             Bed Mobility Assessment/Treatment    Bed Mobility Assessment/Treatment  supine-sit  -LM      Supine-Sit [place] (Bed Mobility)  minimum assist (75% patient effort)  -LM      Assistive Device (Bed Mobility)  bed rails;head of bed elevated  -LM      Recorded by [LM] Louann Lainez, PT 05/02/19  1613      Row Name 05/02/19 1523             Transfer Assessment/Treatment    Transfer Assessment/Treatment  sit-stand transfer;stand-sit transfer  -LM      Comment (Transfers)  Stood from elevated bed - vc's for hand placement  -LM      Recorded by [LM] Louann Lainez, PT 05/02/19 1613      Row Name 05/02/19 1523             Sit-Stand Transfer    Sit-Stand [place] (Transfers)  minimum assist (75% patient effort);verbal cues  -LM      Assistive Device (Sit-Stand Transfers)  walker, front-wheeled  -LM2      Recorded by [LM] Louann Lainez, PT 05/02/19 1615  [LM2] Louann Lainez, PT 05/02/19 1613      Row Name 05/02/19 1523             Stand-Sit Transfer    Stand-Sit Scott (Transfers)  contact guard;verbal cues  -LM      Assistive Device (Stand-Sit Transfers)  walker, front-wheeled  -LM      Recorded by [LM] Luoann Lainez, PT 05/02/19 1613      Row Name 05/02/19 1523             Gait/Stairs Assessment/Training    65662 - Gait Training Minutes   17  -LM      Gait/Stairs Assessment/Training  gait/ambulation independence;gait/ambulation assistive device;distance ambulated  -LM      Scott Level (Gait)  contact guard;verbal cues  -LM      Assistive Device (Gait)  walker, front-wheeled  -LM      Distance in Feet (Gait)  100 feet  -LM      Deviations/Abnormal Patterns (Gait)  gayla decreased;other (see comments) Dec step length  -LM      Comment (Gait/Stairs)  Pt became very fatigued the last 50 feet of gait requiring multiple standing rest breaks.  -LM      Recorded by [LM] Louann Lainez, PT 05/02/19 1613      Row Name 05/02/19 1523             Therapeutic Exercise    Therapeutic Exercise  seated, lower extremities  -LM      Additional Documentation  Therapeutic Exercise (Row)  -LM      29233 - PT Therapeutic Exercise Minutes  10  -LM      76408 - PT Therapeutic Activity Minutes  5  -LM      Recorded by [LM] Louann Lainez, PT 05/02/19 1613      Row Name 05/02/19 1523             Lower Extremity Seated  Therapeutic Exercise    Performed, Seated Lower Extremity (Therapeutic Exercise)  hip abduction/adduction;ankle dorsiflexion/plantarflexion;LAQ (long arc quad), knee extension;other (see comments) Yahir  -PATRICK      Exercise Type, Seated Lower Extremity (Therapeutic Exercise)  AROM (active range of motion)  -LM      Sets/Reps Detail, Seated Lower Extremity (Therapeutic Exercise)  x20 bilaterally  -LM      Comment, Seated Lower Extremity (Therapeutic Exercise)  AP only on R  -LM      Recorded by [LM] Louann Lainez, PT 05/02/19 1613      Row Name 05/02/19 1523             Static Sitting Balance    Level of [place] (Unsupported Sitting, Static Balance)  independent  -LM      Sitting Position (Unsupported Sitting, Static Balance)  sitting on edge of bed  -LM      Time Able to Maintain Position (Unsupported Sitting, Static Balance)  more than 5 minutes  -LM      Recorded by [LM] Louann Lainez, PT 05/02/19 1613      Row Name 05/02/19 1523             Positioning and Restraints    Pre-Treatment Position  in bed  -LM      Post Treatment Position  bed  -LM      In Bed  sitting EOB;call light within reach;encouraged to call for assist;with nsg  -LM      Recorded by [LM] Louann Lainez, PT 05/02/19 1613      Row Name 05/02/19 1523             Pain Scale: Numbers Pre/Post-Treatment    Pain Scale: Numbers, Pretreatment  0/10 - no pain  -LM      Pain Scale: Numbers, Post-Treatment  0/10 - no pain  -LM      Recorded by [LM] Louann Lainez, PT 05/02/19 1613      Row Name                Wound 04/25/19 Bilateral groin MASD (moisture associated skin damage)    Wound - Properties Group Date first assessed: 04/25/19 [CB] Present On Admission : yes [CB] Side: Bilateral [CB] Location: groin [CB] Type: MASD (moisture associated skin damage) [CB] Recorded by:  [CB] Mattie Mayen RN 04/25/19 0824    Row Name                Wound 04/25/19 0845 Right lower;anterior leg abrasion    Wound - Properties Group Date first assessed: 04/25/19  [CP] Time first assessed: 0845 [CP] Present On Admission : yes;picture taken [CP] Side: Right [CP] Orientation: lower;anterior [CP] Location: leg [CP] Type: abrasion [CP2] Recorded by:  [CP] Alivia Cespedes, CHINTAN 04/25/19 1143 [CP2] Rubina Alivia PAIZ APRJAIME 04/25/19 1144    Row Name                Wound 04/26/19 1400 Bilateral abdomen incision    Wound - Properties Group Date first assessed: 04/26/19 [LENA] Time first assessed: 1400 [LENA] Side: Bilateral [LENA] Location: abdomen [LENA] Type: incision [LENA] Recorded by:  [LENA] Sarahi Wesley RN 04/26/19 1400    Row Name 05/02/19 1523             Plan of Care Review    Plan of Care Reviewed With  patient;spouse  -LM      Recorded by [LM] Louann Lainez, PT 05/02/19 1613      Row Name 05/02/19 1523             Outcome Summary/Treatment Plan (PT)    Daily Summary of Progress (PT)  progress toward functional goals is good  -LM      Recorded by [LM] Louann Lainez, PT 05/02/19 1613        User Key  (r) = Recorded By, (t) = Taken By, (c) = Cosigned By    Initials Name Effective Dates Discipline    CP Alivia Cespedes, APRN 02/05/19 -  Nurse    LM Louann Lainez, PT 06/15/16 -  PT    Mattie Johns RN 11/05/18 -  Nurse    Sarahi Guzman RN 06/16/16 -  Nurse          Rash 04/25/19 0845 Left lower leg macular;papule (Active)   Distribution regional 5/1/2019  8:00 PM   Configuration/Shape asymmetric 5/1/2019  8:00 PM   Borders irregular 5/1/2019  8:00 PM   Characteristics dry 5/1/2019  8:00 PM   Color red 5/1/2019  8:00 PM   Care, Rash open to air 5/2/2019  6:00 AM       Wound 04/25/19 Bilateral groin MASD (moisture associated skin damage) (Active)   Dressing Appearance open to air 5/2/2019  6:00 AM   Base moist;red 5/2/2019  6:00 AM   Periwound redness;yeast 5/2/2019  6:00 AM   Care, Wound cleansed with 5/1/2019  8:00 PM   Dressing Care, Wound open to air 5/2/2019  6:00 AM       Wound 04/25/19 0845 Right lower;anterior leg abrasion (Active)   Dressing  Appearance dry;intact 5/2/2019  8:00 AM   Closure KALYN 5/2/2019  6:00 AM   Base dressing in place, unable to visualize 5/2/2019  6:00 AM   Dressing Care, Wound foam 5/2/2019  6:00 AM       Wound 04/26/19 1400 Bilateral abdomen incision (Active)   Dressing Appearance dry;intact 5/2/2019  6:00 AM   Closure KALYN 5/2/2019  6:00 AM   Base dressing in place, unable to visualize 5/2/2019  6:00 AM   Dressing Care, Wound foam 5/2/2019  6:00 AM           Physical Therapy Education     Title: PT OT SLP Therapies (In Progress)     Topic: Physical Therapy (In Progress)     Point: Mobility training (In Progress)     Learning Progress Summary           Patient Acceptance, E, NR by AS at 4/30/2019  3:27 PM    Acceptance, E,D, VU,NR by  at 4/28/2019  2:26 PM                   Point: Home exercise program (In Progress)     Learning Progress Summary           Patient Acceptance, E, NR by AS at 4/30/2019  3:27 PM    Acceptance, E,D, VU,NR by  at 4/28/2019  2:26 PM                   Point: Body mechanics (In Progress)     Learning Progress Summary           Patient Acceptance, E, NR by AS at 4/30/2019  3:27 PM    Acceptance, E,D, VU,NR by  at 4/28/2019  2:26 PM                   Point: Precautions (In Progress)     Learning Progress Summary           Patient Acceptance, E, NR by AS at 4/30/2019  3:27 PM    Acceptance, E,D, VU,NR by  at 4/28/2019  2:26 PM                               User Key     Initials Effective Dates Name Provider Type Discipline    AS 06/22/15 -  Cristal Dixon, PTA Physical Therapy Assistant PT     06/08/18 -  Sabrina Castano, PT Physical Therapist PT                PT Recommendation and Plan     Outcome Summary/Treatment Plan (PT)  Daily Summary of Progress (PT): progress toward functional goals is good  Plan of Care Reviewed With: patient, spouse  Outcome Summary: Pt progressing well towards skilled PT goals.  Pt transferred supine-->sit with Parul, stood with MinAx1 (from elevated bed), and  ambulated 100 feet using rw with CGAx1.  Pt became very fatigued last 50 feet of gait and required multiple standing rest breaks.  Tolerated seated BLE ther ex well without complaint.  Continue with skilled PT to improve mobility and safety.  Outcome Measures     Row Name 05/02/19 1523 04/30/19 1443          How much help from another person do you currently need...    Turning from your back to your side while in flat bed without using bedrails?  4  -LM  4  -AS     Moving from lying on back to sitting on the side of a flat bed without bedrails?  3  -LM  4  -AS     Moving to and from a bed to a chair (including a wheelchair)?  3  -LM  3  -AS     Standing up from a chair using your arms (e.g., wheelchair, bedside chair)?  2  -LM  2  -AS     Climbing 3-5 steps with a railing?  2  -LM  1  -AS     To walk in hospital room?  3  -LM  3  -AS     AM-PAC 6 Clicks Score  17  -LM  17  -AS        Functional Assessment    Outcome Measure Options  AM-PAC 6 Clicks Basic Mobility (PT)  -LM  AM-PAC 6 Clicks Basic Mobility (PT)  -AS       User Key  (r) = Recorded By, (t) = Taken By, (c) = Cosigned By    Initials Name Provider Type    AS Cristal Dixon PTA Physical Therapy Assistant    Louann Carrasquillo PT Physical Therapist         Time Calculation:   PT Charges     Row Name 05/02/19 1523             Time Calculation    Start Time  1523  -LM      PT Received On  05/02/19  -LM      PT Goal Re-Cert Due Date  05/08/19  -LM         Timed Charges    70686 - PT Therapeutic Exercise Minutes  10  -LM      69112 - Gait Training Minutes   17  -LM      76511 - PT Therapeutic Activity Minutes  5  -LM        User Key  (r) = Recorded By, (t) = Taken By, (c) = Cosigned By    Initials Name Provider Type    LM Louann Lainez PT Physical Therapist        Therapy Charges for Today     Code Description Service Date Service Provider Modifiers Qty    12578810355 HC GAIT TRAINING EA 15 MIN 5/2/2019 Louann Lainez, PT GP 1    76157316131 HC PT THER PROC  EA 15 MIN 5/2/2019 Louann Lainez, PT GP 1          PT G-Codes  Outcome Measure Options: AM-PAC 6 Clicks Basic Mobility (PT)  AM-PAC 6 Clicks Score: 17  Score: 21    Louann Lainez, PT  5/2/2019

## 2019-05-02 NOTE — PROGRESS NOTES
[facility] Progress Note        Antibiotics:  Anti-Infectives (From admission, onward)    Ordered     Dose/Rate Route Frequency Start Stop    19 0855  DAPTOmycin (CUBICIN) 400 mg in sodium chloride 0.9 % 50 mL IVPB     Usman Dong MD reviewed the order on 19.   Ordering Provider:  Usman Dong MD    4 mg/kg × 94.3 kg (Adjusted)  100 mL/hr over 30 Minutes Intravenous Every 24 Hours Scheduled 19 1000 19 0859    19 0402  piperacillin-tazobactam (ZOSYN) 3.375 g in iso-osmotic dextrose 50 ml (premix)     Comments:  First dose given in ED,please time from that dose   Usman Dong MD reviewed the order on 19.   Ordering Provider:  Usman Dong MD    3.375 g  over 4 Hours Intravenous Every 8 Hours 19 0600 19 0559    19 2202  vancomycin 2250 mg/500 mL 0.9% NS IVPB ([facility])     Ordering Provider:  Coco Dietrich PA-C    20 mg/kg × 109 kg  200 mL/hr over 2.5 Hours Intravenous Once 19 2204 19 0140    19 2202  piperacillin-tazobactam (ZOSYN) 3.375 g in iso-osmotic dextrose 50 ml (premix)     Ordering Provider:  Coco Dietrich PA-C    3.375 g  over 30 Minutes Intravenous Once 19 2204 19 2254          CC: abd wall pain    HPI:    Patient is a 48 y.o.  Yr old male with history of h/o poorly contolled T2DM, DUNLAP/liver cirrhosis, HTN, PVD/Left BKA, and multiple skin abscesses/MRSA who presented to [facility] ED with right shin wound infection summer 2018.  He was unable to get to see Dr. Martinez as his father passed away unexpectedly on 18 and he had to wait until after the .  The wound worsened becoming gangrenous and he came to [facility] ED in 2018.  He also had been hospitalized at [facility] and then transferred to  for a severe penis/scrotum infection requiring surgical debridement in summer 2018.  He received antibiotics regarding his right leg infection, generally improved over the  "remainder of summer 2018 but that area had not completely healed.     He is admitted April 24, 2019 with acute left lower abdominal wall redness/swelling and pain, spontaneous drainage associated with fever/chills and uncontrolled blood sugars.     4/26 I&D requiring wide debridement     5/2/19 doing ok in general,  Pain is generally constant, sharp, nonradiating, worse with palpation, generally better with pain meds although not completely relieved and currently 4 out of 10.       No diarrhea.  No headache photophobia or neck stiffness.  No shortness of breath cough or hemoptysis.  No nausea vomiting or abdominal pain.  No dysuria hematuria or pyuria.     No  new redness swelling or pain at his right anterior shin. Small nonhealed area but no new deterioration         ROS:      5/2/19 No f/c/s. No n/v/d. No rash. No new ADR to Abx.     PE:   /89 (BP Location: Right arm, Patient Position: Lying)   Pulse 81   Temp 98.2 °F (36.8 °C) (Oral)   Resp 18   Ht 190.5 cm (75\")   Wt 109 kg (240 lb)   SpO2 95%   BMI 30.00 kg/m²       GENERAL: awake, in no acute distress.   HEENT: Normocephalic, atraumatic.  PERRL. EOMI. No conjunctival injection. No icterus. Oropharynx clear without evidence of thrush or exudate. No evidence of peridontal disease.    NECK: Supple without nuchal rigidity. No mass.  LYMPH: No cervical, axillary or inguinal lymphadenopathy.  HEART: RRR; No murmur, rubs, gallops.   LUNGS: Clear to auscultation bilaterally without wheezing, rales, rhonchi. Normal respiratory effort. Nonlabored. No dullness.  ABDOMEN: Soft, tender left lower quadrant near skin abnormality as outlined below, nondistended. Positive bowel sounds. No rebound or guarding. NO mass or HSM.  EXT:  No cyanosis, clubbing or edema. No cord.  : Genitalia generally unremarkable.  Without Walker catheter.  MSK: FROM without joint effusions noted arms/legs.    SKIN: Warm and dry without cutaneous eruptions on Inspection/palpation.  "   NEURO: awake     Anterior abdominal wall,large surgical wound noted  and large area of redness/warmth and induration.  No discrete fluctuance.  No crepitus or bulla.  No dermatomal pattern.     No peripheral stigmata/phenomena of endocarditis     Right lower leg with nonhealed area anterior shin through epidermis but no probe to bone tendon joint or ligament.  No redness induration or warmth.  No mass bulge or fluctuance.  No crepitus or bulla.  Some crust           Laboratory Data    Results from last 7 days   Lab Units 04/30/19  0453 04/29/19  0503 04/28/19  0435   WBC 10*3/mm3 9.76 11.76* 13.30*   HEMOGLOBIN g/dL 11.5* 11.3* 11.5*   HEMATOCRIT % 36.0* 34.6* 35.7*   PLATELETS 10*3/mm3 251 255 236     Results from last 7 days   Lab Units 04/29/19  0503   SODIUM mmol/L 136   POTASSIUM mmol/L 4.3   CHLORIDE mmol/L 100   CO2 mmol/L 26.0   BUN mg/dL 28*   CREATININE mg/dL 0.59*   GLUCOSE mg/dL 223*   CALCIUM mg/dL 8.4*     Results from last 7 days   Lab Units 04/28/19  0435   ALK PHOS U/L 146*   BILIRUBIN mg/dL 0.2   ALT (SGPT) U/L 17   AST (SGOT) U/L 13               Estimated Creatinine Clearance: 199.8 mL/min (A) (by C-G formula based on SCr of 0.59 mg/dL (L)).      Microbiology:      Radiology:  Imaging Results (last 72 hours)     Procedure Component Value Units Date/Time    CT Abdomen Pelvis With Contrast [[id number]] Collected:  04/24/19 2340     Updated:  04/24/19 2342    Narrative:       CT Abdomen Pelvis W    INDICATION:   Left lower quadrant abdominal wall swelling and redness for the past week    TECHNIQUE:   CT of the abdomen and pelvis with 95 cc of Isovue-300 contrast. Coronal and sagittal reconstructions were obtained.  Radiation dose reduction techniques included automated exposure control or exposure modulation based on body size. Count of known CT and  cardiac nuc med studies performed in previous 12 months: 1.     COMPARISON:   7/30/2018    FINDINGS:  Abdomen: No upper abdominal solid organ  abnormalities are noted. Again noted incidentally is a left adrenal adenoma that is unchanged. There is no evidence of retroperitoneal adenopathy. There are no distended bowel loops. The appendix is normal.    Pelvis: Mild skin thickening is noted with subcutaneous edema seen in the left lower quadrant. The left groin inflammatory changes extend a little bit deeper. There is a more consolidated area of subcutaneous inflammation at the left groin measuring 2.5  cm in diameter. There is no evidence of a drainable abscess. Left inguinal lymph nodes are mildly prominent and are likely reactive. No adenopathy is seen in the pelvis itself.      Impression:       Cellulitis of the left lower quadrant abdominal wall with more focal inflammatory change at the left groin. No drainable abscess is seen. There are reactive lymph nodes in the left inguinal region.          Signer Name: El Reyes MD   Signed: 4/24/2019 11:40 PM   Workstation Name: [id number]               Impression:      --Acute anterior abdominal wall, left lower quadrant cellulitis/abscess s/p I&D 4/26 requiring wide debridement;  D/w Dr Kern.  Surgery following to determine timing/options/threshold for further surgical debridement.  This is further complicated by uncontrolled diabetes and past history of prior severe soft tissue infections.  Broad antimicrobials ongoing empirically with daptomycin/Zosyn.  Taper once further microbiologic data     --Diabetes mellitus type 2, uncontrolled.  You need to tightly control blood sugar to give best chance for healing     --History right lower leg infection.  Chronic nonhealed area anterior shin with ongoing skin care.  No obvious secondary infection at present.  Monitor.  Any specific timing/option or threshold for further vascular work-up per vascular team at their discretion     --History MRSA     --History DUNLAP and chronic liver disease per past notes     --Prior left BKA     --Peripheral vascular disease.   "Chronic and current extent/severity unclear and would need to be clarified by vascular team at their discretion           PLAN:     --IV daptomycin/Zosyn;  Waiting on culture data/clinical course to consider tapering     --Check/review labs cultures and scans     --Highly complex set of issues with high risk for further serious morbidity and other serious sequela     --Discussed with microbiology and history per nursing staff; surgical team following    **microbiology is working up cultures, originally reported by them as \"mixed gram positive sherly\";  I have asked them to evaluate for S Aureus/pathogenic strep/enterococcus -v-  gm+ skin sherly/CN staph etc..AS OF 5/2 no S Aureus, no enterococcus and likely mixture of CN staph species and strep, otherwise pending final data    --d/w Dr Dotson;  IF [facility] I anticipate tapering to zosyn/doxycycline for disposition, then consider further adjustments depending on final culture data    Usman Dong MD  5/2/2019        "

## 2019-05-02 NOTE — PLAN OF CARE
Problem: Patient Care Overview  Goal: Plan of Care Review  Outcome: Ongoing (interventions implemented as appropriate)   05/02/19 3686   Coping/Psychosocial   Plan of Care Reviewed With patient;spouse   OTHER   Outcome Summary Pt progressing well towards skilled PT goals. Pt transferred supine-->sit with Parul, stood with MinAx1 (from elevated bed), and ambulated 100 feet using rw with CGAx1. Pt became very fatigued last 50 feet of gait and required multiple standing rest breaks. Tolerated seated BLE ther ex well without complaint. Continue with skilled PT to improve mobility and safety.

## 2019-05-02 NOTE — PLAN OF CARE
Problem: Patient Care Overview  Goal: Plan of Care Review   05/02/19 0312   Coping/Psychosocial   Plan of Care Reviewed With patient;spouse   Plan of Care Review   Progress no change   OTHER   Outcome Summary VSS, no c/o pain. Pt still retaining large volumes of urine and refuses I&O cath. Will continue to monitor.       Problem: Skin Injury Risk (Adult)  Goal: Skin Health and Integrity  Outcome: Ongoing (interventions implemented as appropriate)      Problem: Diabetes, Type 2 (Adult)  Goal: Signs and Symptoms of Listed Potential Problems Will be Absent, Minimized or Managed (Diabetes, Type 2)  Outcome: Ongoing (interventions implemented as appropriate)      Problem: Infection, Risk/Actual (Adult)  Goal: Infection Prevention/Resolution  Outcome: Ongoing (interventions implemented as appropriate)      Problem: Urine Elimination Impaired (Adult)  Goal: Identify Related Risk Factors and Signs and Symptoms  Outcome: Outcome(s) achieved Date Met: 05/02/19    Goal: Effective or Improved Urinary Elimination  Outcome: Ongoing (interventions implemented as appropriate)    Goal: Effective Containment of Urine  Outcome: Outcome(s) achieved Date Met: 05/02/19    Goal: Reduced Incontinence Episodes  Outcome: Outcome(s) achieved Date Met: 05/02/19

## 2019-05-02 NOTE — PROGRESS NOTES
Continued Stay Note  [facility]     Patient Name: Kendrick Crystal  MRN: [id number]  Today's Date: 5/2/2019    Admit Date: 4/24/2019    Discharge Plan     Row Name 05/02/19 1615       Plan    Plan  Social work was called late in the afternoon and told that the patient will have a bed at [facility] on Friday 5/3/2019. The patients spouse is wanting to transport the patient to [facility] if he is discharged on Friday 5/3/19.     Patient/Family in Agreement with Plan  yes        Discharge Codes    No documentation.             JOHANNA Townsend

## 2019-05-02 NOTE — PROGRESS NOTES
[facility] Medicine Services  PROGRESS NOTE    Patient Name: Kendrick Crystal  : 1970  MRN: [id number]    Date of Admission: 2019  Length of Stay: 7  Primary Care Physician: Karri Hawkins MD    Subjective   Subjective     CC:  abd wall abscess     HPI:  Patient up in bed. States urinating better. No n/v/d. No f/c/s. Pain controlled    Review of Systems  Gen- No fevers, chills  CV- No chest pain, palpitations, (+) swelling to lower extremities  Resp- No cough, dyspnea  GI- No N/V/D, (-) abdominal pain    Otherwise ROS is negative except as mentioned in the HPI.    Objective   Objective     Vital Signs:   Temp:  [98.2 °F (36.8 °C)-98.4 °F (36.9 °C)] 98.2 °F (36.8 °C)  Heart Rate:  [81-82] 81  Resp:  [17-18] 18  BP: (143-149)/(74-89) 149/89        Physical Exam:  Constitutional: No acute distress, awake, alert- sitting on bedside  HENT: NCAT, mucous membranes moist  Respiratory: clear to auscultation upper, lower lobes diminished, expansion equal bilaterally   Cardiovascular: RRR, no murmurs, rubs, or gallops, palpable pedal pulses bilaterally  Gastrointestinal: Positive bowel sounds, soft, tender to palpation, nondistended  Musculoskeletal: +1 edema RLE; L BKA   Psychiatric: Appropriate affect, cooperative  Neurologic: Oriented x 3, strength symmetric in all extremities, speech clear, no focal deficits  Skin: large abdominal wound to LLQ, dressing dry and intact, minimal erythema surrounding open wound with packing intact. RLE intact/ clean  No change in exam from 19    Results Reviewed:  I have personally reviewed current lab, radiology, and data and agree.    Results from last 7 days   Lab Units 19  0453 19  0503 19  0435   WBC 10*3/mm3 9.76 11.76* 13.30*   HEMOGLOBIN g/dL 11.5* 11.3* 11.5*   HEMATOCRIT % 36.0* 34.6* 35.7*   PLATELETS 10*3/mm3 251 255 236     Results from last 7 days   Lab Units 19  0503 19  0435 19  0458  19  0344    SODIUM mmol/L 136 132* 133*  --  133*   POTASSIUM mmol/L 4.3 4.4 4.3   < > 3.4*   CHLORIDE mmol/L 100 99 99  --  97*   CO2 mmol/L 26.0 23.0 21.0*  --  23.0   BUN mg/dL 28* 28* 19  --  16   CREATININE mg/dL 0.59* 0.68* 0.70*  --  0.63*   GLUCOSE mg/dL 223* 372* 330*  --  234*   CALCIUM mg/dL 8.4* 8.0* 8.5*  --  8.3*   ALT (SGPT) U/L  --  17  --   --  15   AST (SGOT) U/L  --  13  --   --  17    < > = values in this interval not displayed.     Estimated Creatinine Clearance: 199.8 mL/min (A) (by C-G formula based on SCr of 0.59 mg/dL (L)).    No results found for: BNP    Microbiology Results Abnormal     Procedure Component Value - Date/Time    Wound Culture - Drainage, Abdominal Wall [[id number]]  (Abnormal) Collected:  04/24/19 2219    Lab Status:  Preliminary result Specimen:  Drainage from Abdominal Wall Updated:  05/02/19 1133     Wound Culture Moderate growth (3+) Gram Positive Cocci     Gram Stain Many (4+) WBCs seen      Moderate (3+) Gram positive cocci in pairs and chains    Fungus Culture - Wound, Abdominal Wall [[id number]] Collected:  04/26/19 1350    Lab Status:  Preliminary result Specimen:  Wound from Abdominal Wall Updated:  05/01/19 1446     Fungus Culture No fungus isolated at less than 1 week    AFB Culture - Wound, Abdominal Wall [[id number]] Collected:  04/26/19 1350    Lab Status:  Preliminary result Specimen:  Wound from Abdominal Wall Updated:  05/01/19 1446     AFB Culture No AFB isolated at less than 1 week     AFB Stain No acid fast bacilli seen on concentrated smear    Anaerobic Culture - Wound, Abdominal Wall [[id number]] Collected:  04/26/19 1350    Lab Status:  Final result Specimen:  Wound from Abdominal Wall Updated:  05/01/19 1320     Anaerobic Culture No anaerobes isolated at 5 days    Blood Culture - Blood, Hand, Left [[id number]] Collected:  04/24/19 2215    Lab Status:  Final result Specimen:  Blood from Hand, Left Updated:  04/29/19 0700     Blood Culture No growth at 5 days     Blood Culture - Blood, Arm, Left [[id number]] Collected:  04/24/19 2200    Lab Status:  Final result Specimen:  Blood from Arm, Left Updated:  04/29/19 2245     Blood Culture No growth at 5 days    Wound Culture - Wound, Abdominal Wall [[id number]]  (Abnormal) Collected:  04/26/19 1350    Lab Status:  Final result Specimen:  Wound from Abdominal Wall Updated:  04/28/19 0821     Wound Culture Light growth (2+) Lactobacillus species     Comment: Lactobacillus species is a component of normal human sherly.  Clindamycin, Penicillin, and Ampicillin generally are the most active in vitro agents.  Resistance to Vancomycin and Aminoglycosides is reported.          Gram Stain Moderate (3+) Gram positive cocci in pairs      Moderate (3+) Gram variable bacilli      Moderate (3+) WBCs seen          Imaging Results (last 24 hours)     ** No results found for the last 24 hours. **          Results for orders placed during the hospital encounter of 02/28/17   Echocardiogram 2D complete    Narrative · Left ventricular function is normal. Estimated EF = 65%.  · Left ventricular wall thickness is consistent with mild concentric   hypertrophy.  · All left ventricular wall segments contract normally.          I have reviewed the medications:    Current Facility-Administered Medications:   •  acetaminophen (TYLENOL) tablet 650 mg, 650 mg, Oral, Q4H PRN, Imelda Paige APRN  •  amLODIPine (NORVASC) tablet 10 mg, 10 mg, Oral, Q24H, Mami Luz APRN, 10 mg at 05/02/19 0826  •  DAPTOmycin (CUBICIN) 400 mg in sodium chloride 0.9 % 50 mL IVPB, 4 mg/kg (Adjusted), Intravenous, Q24H, Usman Dong MD, Last Rate: 100 mL/hr at 05/02/19 1020, 400 mg at 05/02/19 1020  •  dextrose (D50W) 25 g/ 50mL Intravenous Solution 25 g, 25 g, Intravenous, Q15 Min PRN, Imelda Paige APRN  •  dextrose (GLUTOSE) oral gel 15 g, 15 g, Oral, Q15 Min PRN, Imelda Paige, CHINTAN  •  DULoxetine (CYMBALTA) DR capsule 30 mg, 30 mg, Oral, Daily, Royal  CHINTAN Hannah, 30 mg at 05/02/19 0827  •  enoxaparin (LOVENOX) syringe 40 mg, 40 mg, Subcutaneous, Q24H, Imelda Paige APRN, 40 mg at 05/02/19 0546  •  gabapentin (NEURONTIN) capsule 1,200 mg, 1,200 mg, Oral, TID, Martina Mario MD, 1,200 mg at 05/02/19 0826  •  glucagon (human recombinant) (GLUCAGEN DIAGNOSTIC) injection 1 mg, 1 mg, Subcutaneous, PRN, Imelda Paige APRN  •  hydrALAZINE (APRESOLINE) injection 10 mg, 10 mg, Intravenous, Q4H PRN, Radames Alberts MD, 10 mg at 04/30/19 1343  •  HYDROcodone-acetaminophen (NORCO)  MG per tablet 1 tablet, 1 tablet, Oral, TID PRN, Martina Mario MD, 1 tablet at 05/01/19 1434  •  HYDROmorphone (DILAUDID) injection 1 mg, 1 mg, Intravenous, Q12H PRN, Fadi Kern MD, 1 mg at 04/29/19 1532  •  insulin detemir (LEVEMIR) injection 46 Units, 46 Units, Subcutaneous, Q12H, Mami Luz APRN  •  insulin lispro (humaLOG) injection 0-14 Units, 0-14 Units, Subcutaneous, 4x Daily With Meals & Nightly, Radames Alberts MD, 5 Units at 05/02/19 1147  •  insulin lispro (humaLOG) injection 20 Units, 20 Units, Subcutaneous, TID With Meals, Radames Alberts MD, 20 Units at 05/02/19 1147  •  lactobacillus acidophilus (RISAQUAD) capsule 1 capsule, 1 capsule, Oral, Daily, Imelda Paige APRN, 1 capsule at 05/02/19 0826  •  lisinopril (PRINIVIL,ZESTRIL) tablet 40 mg, 40 mg, Oral, Daily, Imelda Paige APRN, 40 mg at 05/02/19 0826  •  magnesium sulfate 4 gram infusion - Mg less than or equal to 1mg/dL, 4 g, Intravenous, PRN **OR** magnesium sulfate 3 gram infusion (1gm x 3) - Mg 1.1 - 1.5 mg/dL, 1 g, Intravenous, PRN **OR** Magnesium Sulfate 2 gram infusion- Mg 1.6 - 1.9 mg/dL, 2 g, Intravenous, PRN, Radames Alberts MD, Last Rate: 25 mL/hr at 04/26/19 0958, 2 g at 04/26/19 0958  •  metoprolol tartrate (LOPRESSOR) tablet 75 mg, 75 mg, Oral, Q12H, Mami Luz, APRN  •  piperacillin-tazobactam (ZOSYN) 3.375 g in iso-osmotic dextrose 50 ml  (premix), 3.375 g, Intravenous, Q8H, Usman Dong MD, 3.375 g at 05/02/19 0546  •  potassium chloride (MICRO-K) CR capsule 40 mEq, 40 mEq, Oral, PRN **OR** potassium chloride (KLOR-CON) packet 40 mEq, 40 mEq, Oral, PRN **OR** potassium chloride 10 mEq in 100 mL IVPB, 10 mEq, Intravenous, Q1H PRN, Radames Alberts MD, Stopped at 04/26/19 1004  •  sodium chloride 0.9 % flush 3 mL, 3 mL, Intravenous, Q12H, Imelda Paige, APRN, 3 mL at 05/01/19 0833  •  sodium chloride 0.9 % flush 3-10 mL, 3-10 mL, Intravenous, PRN, Imelda Paige, APRN  •  tamsulosin (FLOMAX) 24 hr capsule 0.8 mg, 0.8 mg, Oral, Nightly, Mami Luz, APRN, 0.8 mg at 05/01/19 2146      Assessment/Plan   Assessment / Plan     Active Hospital Problems    Diagnosis POA   • **Sepsis affecting skin (CMS/HCC) [A41.9] Yes   • Acute urinary retention [R33.8] Unknown   • DM (diabetes mellitus) type II uncontrolled, periph vascular disorder (CMS/HCC) [E11.51, E11.65] Yes   • Abdominal wall abscess [L02.211] Yes   • S/P BKA (below knee amputation), left (CMS/HCC) [Z89.512] Not Applicable   • Hyponatremia [E87.1] Yes   • NÚÑEZ Cirrhosis [K74.60] Yes          Brief Hospital Course to date:  Kendrick Crystal is a 48 y.o. male w/ dm, previous left bka, núñez cirrhosis who ran out of his insulin ~6-8 weeks ago. Developed lesion on anterior abdominal well which worsened and became tender, red and ultimately presented to [facility] ED for further evaluation.       Plan:  - s/p debridement on 4/26/19. Continue post op wound care per Dr. Kern. Dapto and Zosyn per Dr. Dong. Wound cultures + lactobacillus. Plan PICC today and Zosyn/ Doxy at discharge     - A1c >14. Non compliant with insulin at home. Increase levemir to 45 units bid. Reasonably well controlled now     -s/p diabetes educator consultation.     - continue lisinopril and increased norvasc. Increase metoprolol to 75mg bid today. Prn hydralazine     - urology following for  acute retention. flomax increased. Continue PVR and monitor. Patient continues to refuse I/O cath      Plan rehab when bed available. PT to see and evaluate today     DVT Prophylaxis:  Sq lovenox     Disposition: I expect the patient to be discharged to rehab anytime      CODE STATUS:   Code Status and Medical Interventions:   Ordered at: 04/25/19 0139     Code Status:    CPR     Medical Interventions (Level of Support Prior to Arrest):    Full         Electronically signed by CHINTAN Gannon, 05/02/19, 1:24 PM.

## 2019-05-02 NOTE — PROGRESS NOTES
Continued Stay Note  [facility]     Patient Name: Kendrick Crystal  MRN: [id number]  Today's Date: 5/2/2019    Admit Date: 4/24/2019    Discharge Plan     Row Name 05/02/19 1117       Plan    Plan  Social work called the [facility] liaison [place] and left her a message to notify her of the antibotic orders. Social work is waiting for a return call from [facility] to find out if they could accept Mr. Crystal on Friday 5/3/19.  Social work spoke with Mr. Crystal and explained that social work is waiting for a return call from [facility] today.    Patient/Family in Agreement with Plan  yes        Discharge Codes    No documentation.             JOHANNA Townsend

## 2019-05-03 VITALS
BODY MASS INDEX: 29.84 KG/M2 | OXYGEN SATURATION: 95 % | WEIGHT: 240 LBS | SYSTOLIC BLOOD PRESSURE: 128 MMHG | TEMPERATURE: 98 F | DIASTOLIC BLOOD PRESSURE: 79 MMHG | HEART RATE: 79 BPM | HEIGHT: 75 IN | RESPIRATION RATE: 20 BRPM

## 2019-05-03 PROBLEM — L02.211 ABDOMINAL WALL ABSCESS: Status: RESOLVED | Noted: 2019-04-25 | Resolved: 2019-05-03

## 2019-05-03 PROBLEM — E87.1 HYPONATREMIA: Status: RESOLVED | Noted: 2017-01-25 | Resolved: 2019-05-03

## 2019-05-03 PROBLEM — A41.9 SEPSIS AFFECTING SKIN: Status: RESOLVED | Noted: 2017-02-28 | Resolved: 2019-05-03

## 2019-05-03 PROBLEM — R33.8 ACUTE URINARY RETENTION: Status: RESOLVED | Noted: 2019-04-30 | Resolved: 2019-05-03

## 2019-05-03 LAB
ALBUMIN SERPL-MCNC: 2.7 G/DL (ref 3.5–5.2)
ALBUMIN/GLOB SERPL: 0.8 G/DL
ALP SERPL-CCNC: 152 U/L (ref 39–117)
ALT SERPL W P-5'-P-CCNC: 21 U/L (ref 1–41)
ANION GAP SERPL CALCULATED.3IONS-SCNC: 10 MMOL/L
AST SERPL-CCNC: 15 U/L (ref 1–40)
BACTERIA SPEC AEROBE CULT: ABNORMAL
BILIRUB SERPL-MCNC: 0.2 MG/DL (ref 0.2–1.2)
BUN BLD-MCNC: 30 MG/DL (ref 6–20)
BUN/CREAT SERPL: 41.7 (ref 7–25)
CALCIUM SPEC-SCNC: 8.8 MG/DL (ref 8.6–10.5)
CHLORIDE SERPL-SCNC: 97 MMOL/L (ref 98–107)
CO2 SERPL-SCNC: 28 MMOL/L (ref 22–29)
CREAT BLD-MCNC: 0.72 MG/DL (ref 0.76–1.27)
DEPRECATED RDW RBC AUTO: 44.6 FL (ref 37–54)
ERYTHROCYTE [DISTWIDTH] IN BLOOD BY AUTOMATED COUNT: 13.8 % (ref 12.3–15.4)
GFR SERPL CREATININE-BSD FRML MDRD: 116 ML/MIN/1.73
GLOBULIN UR ELPH-MCNC: 3.5 GM/DL
GLUCOSE BLD-MCNC: 255 MG/DL (ref 65–99)
GLUCOSE BLDC GLUCOMTR-MCNC: 167 MG/DL (ref 70–130)
GLUCOSE BLDC GLUCOMTR-MCNC: 319 MG/DL (ref 70–130)
GRAM STN SPEC: ABNORMAL
GRAM STN SPEC: ABNORMAL
HCT VFR BLD AUTO: 34.1 % (ref 37.5–51)
HGB BLD-MCNC: 10.8 G/DL (ref 13–17.7)
MCH RBC QN AUTO: 28 PG (ref 26.6–33)
MCHC RBC AUTO-ENTMCNC: 31.7 G/DL (ref 31.5–35.7)
MCV RBC AUTO: 88.3 FL (ref 79–97)
PLATELET # BLD AUTO: 243 10*3/MM3 (ref 140–450)
PMV BLD AUTO: 11.3 FL (ref 6–12)
POTASSIUM BLD-SCNC: 4.7 MMOL/L (ref 3.5–5.2)
PROT SERPL-MCNC: 6.2 G/DL (ref 6–8.5)
RBC # BLD AUTO: 3.86 10*6/MM3 (ref 4.14–5.8)
SODIUM BLD-SCNC: 135 MMOL/L (ref 136–145)
WBC NRBC COR # BLD: 13.17 10*3/MM3 (ref 3.4–10.8)

## 2019-05-03 PROCEDURE — 63710000001 INSULIN DETEMIR PER 5 UNITS: Performed by: NURSE PRACTITIONER

## 2019-05-03 PROCEDURE — 25010000002 PIPERACILLIN SOD-TAZOBACTAM PER 1 G: Performed by: INTERNAL MEDICINE

## 2019-05-03 PROCEDURE — 25010000002 DAPTOMYCIN PER 1 MG: Performed by: INTERNAL MEDICINE

## 2019-05-03 PROCEDURE — 99239 HOSP IP/OBS DSCHRG MGMT >30: CPT | Performed by: FAMILY MEDICINE

## 2019-05-03 PROCEDURE — 63710000001 INSULIN LISPRO (HUMAN) PER 5 UNITS: Performed by: INTERNAL MEDICINE

## 2019-05-03 PROCEDURE — 25010000002 ENOXAPARIN PER 10 MG: Performed by: NURSE PRACTITIONER

## 2019-05-03 PROCEDURE — 85027 COMPLETE CBC AUTOMATED: CPT | Performed by: INTERNAL MEDICINE

## 2019-05-03 PROCEDURE — 82962 GLUCOSE BLOOD TEST: CPT

## 2019-05-03 PROCEDURE — 80053 COMPREHEN METABOLIC PANEL: CPT | Performed by: INTERNAL MEDICINE

## 2019-05-03 RX ORDER — AMLODIPINE BESYLATE 10 MG/1
10 TABLET ORAL
Start: 2019-05-04 | End: 2019-05-30

## 2019-05-03 RX ORDER — DOXYCYCLINE 50 MG/1
100 CAPSULE ORAL 2 TIMES DAILY
Start: 2019-05-03 | End: 2019-05-13

## 2019-05-03 RX ORDER — TAMSULOSIN HYDROCHLORIDE 0.4 MG/1
0.8 CAPSULE ORAL NIGHTLY
Qty: 30 CAPSULE
Start: 2019-05-03 | End: 2019-06-11 | Stop reason: SDUPTHER

## 2019-05-03 RX ORDER — L.ACID,PARA/B.BIFIDUM/S.THERM 8B CELL
1 CAPSULE ORAL DAILY
Start: 2019-05-04 | End: 2019-07-12

## 2019-05-03 RX ORDER — METOPROLOL TARTRATE 75 MG/1
75 TABLET, FILM COATED ORAL EVERY 12 HOURS SCHEDULED
Start: 2019-05-03 | End: 2019-06-11 | Stop reason: SDUPTHER

## 2019-05-03 RX ADMIN — METOPROLOL TARTRATE 75 MG: 50 TABLET ORAL at 08:28

## 2019-05-03 RX ADMIN — AMLODIPINE BESYLATE 10 MG: 10 TABLET ORAL at 08:28

## 2019-05-03 RX ADMIN — GABAPENTIN 1200 MG: 400 CAPSULE ORAL at 08:28

## 2019-05-03 RX ADMIN — INSULIN DETEMIR 46 UNITS: 100 INJECTION, SOLUTION SUBCUTANEOUS at 08:34

## 2019-05-03 RX ADMIN — INSULIN LISPRO 20 UNITS: 100 INJECTION, SOLUTION INTRAVENOUS; SUBCUTANEOUS at 13:11

## 2019-05-03 RX ADMIN — HYDROCODONE BITARTRATE AND ACETAMINOPHEN 1 TABLET: 10; 325 TABLET ORAL at 08:28

## 2019-05-03 RX ADMIN — INSULIN LISPRO 3 UNITS: 100 INJECTION, SOLUTION INTRAVENOUS; SUBCUTANEOUS at 13:12

## 2019-05-03 RX ADMIN — DULOXETINE HYDROCHLORIDE 30 MG: 30 CAPSULE, DELAYED RELEASE ORAL at 08:28

## 2019-05-03 RX ADMIN — ENOXAPARIN SODIUM 40 MG: 40 INJECTION SUBCUTANEOUS at 05:18

## 2019-05-03 RX ADMIN — INSULIN LISPRO 20 UNITS: 100 INJECTION, SOLUTION INTRAVENOUS; SUBCUTANEOUS at 08:29

## 2019-05-03 RX ADMIN — Medication 1 CAPSULE: at 08:28

## 2019-05-03 RX ADMIN — TAZOBACTAM SODIUM AND PIPERACILLIN SODIUM 3.38 G: 375; 3 INJECTION, SOLUTION INTRAVENOUS at 05:18

## 2019-05-03 RX ADMIN — LISINOPRIL 40 MG: 40 TABLET ORAL at 08:28

## 2019-05-03 RX ADMIN — INSULIN LISPRO 10 UNITS: 100 INJECTION, SOLUTION INTRAVENOUS; SUBCUTANEOUS at 08:29

## 2019-05-03 RX ADMIN — DAPTOMYCIN 400 MG: 500 INJECTION, POWDER, LYOPHILIZED, FOR SOLUTION INTRAVENOUS at 08:29

## 2019-05-03 NOTE — PROGRESS NOTES
Case Management Discharge Note    Final Note: Pt. has a bed available today at [facility]- [facility].  SW met with pt. at bedside and he stated he is ready for discharge today.  Pt.'s wife will provide transportation.  RN will need to call report to the [facility] Unit at [phone].  [place] Marinelli, stated [facility] has EPIC access and no records need to be faxed to the facilty.  Pt. denies having additional discharge needs.    Destination - Selection Complete      Service Provider Request Status Selected Services Address Phone Number Fax Number    [facility] Selected Inpatient Rehabilitation [address] [address] [phone]      Durable Medical Equipment      No service has been selected for the patient.      Dialysis/Infusion      No service has been selected for the patient.      Home Medical Care      No service has been selected for the patient.      Therapy      No service has been selected for the patient.      Community Resources      No service has been selected for the patient.             Final Discharge Disposition Code: 62 - inpatient rehab facility

## 2019-05-03 NOTE — DISCHARGE SUMMARY
[facility] Medicine Services  TRANSFER SUMMARY    Patient Name: Kendrick Crystal  : 1970  MRN: [id number]    Date of Admission: 2019  Date of Discharge:  19    Length of Stay: 8  Primary Care Physician: Karri Hawkins MD    Consults     Date and Time Order Name Status Description    2019 1930 Inpatient Urology Consult Completed     2019 0838 Inpatient General Surgery Consult Completed     2019 0709 Inpatient Infectious Diseases Consult Completed           Hospital Course     Presenting Problem:   Cellulitis of abdominal wall [L03.311]      Active Hospital Problems    Diagnosis  POA   • DM (diabetes mellitus) type II uncontrolled, periph vascular disorder (CMS/HCC) [E11.51, E11.65]  Yes   • S/P BKA (below knee amputation), left (CMS/[facility]) [Z89.512]  Not Applicable   • DUNLAP Cirrhosis [K74.60]  Yes      Resolved Hospital Problems    Diagnosis Date Resolved POA   • **Sepsis affecting skin (CMS/[facility]) [A41.9] 2019 Yes   • Acute urinary retention [R33.8] 2019 Yes   • Abdominal wall abscess [L02.211] 2019 Yes   • Hyponatremia [E87.1] 2019 Yes          Hospital Course:  Kendrick Crystal is a 48 y.o. male w/ DM2 poorly controlled due to noncompliance, previous left BKA, and DUNLAP cirrhosis who ran out of his insulin ~6-8 weeks prior to admission. He had developed lesion on anterior abdominal well which worsened and became tender, red and ultimately presented to [facility] ED for further evaluation found to have abdominal wall abcess.        - s/p debridement on 19 by Dr. eKrn. Patient treated with empiric IV Dapto and Zosyn per Dr. Dong of [facility] -->  Transitioning to IV Zosyn and PO Doxy at discharge     - A1c >14. Non compliant with insulin at home. Increased levemir to 46 units BID. Reasonably well controlled now, restarting home Metformin and increasing to 1000mg PO BID. Continue Qac Lispro and titrate down if FSBS's  continue to improve on increased Levemir and Metformin.      - continue lisinopril, also now has new Rx's for Norvasc and metoprolol. BP's have been improved.           Discharge Follow Up Recommendations for labs/diagnostics:  -  Wound Care: packed with a Kerlix into the tunnel inferiorly and laterally (including the tunnels) BID with betadine  - Antibiotics: LIDC will continue to follow at [facility] for duration of abx and/or titration of abx as new Cx data returns.        Day of Discharge     HPI:   No new events or complaints.     ROS:  Otherwise complete ROS is negative except as mentioned in the HPI.    Vital Signs:   Temp:  [97.9 °F (36.6 °C)-98 °F (36.7 °C)] 98 °F (36.7 °C)  Heart Rate:  [79-87] 79  Resp:  [18-20] 20  BP: (128-162)/(79-95) 128/79     Physical Exam:  Constitutional: No acute distress, awake, alert, nontoxic, obese body habitus  Respiratory: Clear to auscultation bilaterally, good effort, nonlabored respirations   Cardiovascular: RRR, no murmur  Gastrointestinal: Positive bowel sounds, soft, nontender, nondistended  Musculoskeletal: No peripheral edema, normal muscle tone for age  Psychiatric: Appropriate affect, good insight and judgement, cooperative  Neurologic: Oriented x 3, movements symmetric BUE and BLE, Cranial Nerves grossly intact to confrontation, speech clear and fluent  Skin: debridement site c/d/i with packing    Pertinent Results     Results from last 7 days   Lab Units 05/03/19  0419 04/30/19  0453 04/29/19  0503 04/28/19  0435 04/27/19  0458   WBC 10*3/mm3 13.17* 9.76 11.76* 13.30* 18.83*   HEMOGLOBIN g/dL 10.8* 11.5* 11.3* 11.5* 12.7*   HEMATOCRIT % 34.1* 36.0* 34.6* 35.7* 38.3   PLATELETS 10*3/mm3 243 251 255 236 234   SODIUM mmol/L 135*  --  136 132* 133*   POTASSIUM mmol/L 4.7  --  4.3 4.4 4.3   CHLORIDE mmol/L 97*  --  100 99 99   CO2 mmol/L 28.0  --  26.0 23.0 21.0*   BUN mg/dL 30*  --  28* 28* 19   CREATININE mg/dL 0.72*  --  0.59* 0.68* 0.70*   GLUCOSE mg/dL 255*  --  223*  372* 330*   CALCIUM mg/dL 8.8  --  8.4* 8.0* 8.5*     Results from last 7 days   Lab Units 05/03/19  0419 04/28/19  0435   BILIRUBIN mg/dL 0.2 0.2   ALK PHOS U/L 152* 146*   ALT (SGPT) U/L 21 17   AST (SGOT) U/L 15 13           Invalid input(s): TG, LDLCALC, LDLREALC      Brief Urine Lab Results     None          Microbiology Results Abnormal     Procedure Component Value - Date/Time    Wound Culture - Drainage, Abdominal Wall [[id number]]  (Abnormal) Collected:  04/24/19 2219    Lab Status:  Preliminary result Specimen:  Drainage from Abdominal Wall Updated:  05/02/19 1133     Wound Culture Moderate growth (3+) Gram Positive Cocci     Gram Stain Many (4+) WBCs seen      Moderate (3+) Gram positive cocci in pairs and chains    Fungus Culture - Wound, Abdominal Wall [[id number]] Collected:  04/26/19 1350    Lab Status:  Preliminary result Specimen:  Wound from Abdominal Wall Updated:  05/01/19 1446     Fungus Culture No fungus isolated at less than 1 week    AFB Culture - Wound, Abdominal Wall [[id number]] Collected:  04/26/19 1350    Lab Status:  Preliminary result Specimen:  Wound from Abdominal Wall Updated:  05/01/19 1446     AFB Culture No AFB isolated at less than 1 week     AFB Stain No acid fast bacilli seen on concentrated smear    Anaerobic Culture - Wound, Abdominal Wall [[id number]] Collected:  04/26/19 1350    Lab Status:  Final result Specimen:  Wound from Abdominal Wall Updated:  05/01/19 1320     Anaerobic Culture No anaerobes isolated at 5 days    Blood Culture - Blood, Hand, Left [[id number]] Collected:  04/24/19 2215    Lab Status:  Final result Specimen:  Blood from Hand, Left Updated:  04/29/19 2245     Blood Culture No growth at 5 days    Blood Culture - Blood, Arm, Left [[id number]] Collected:  04/24/19 2200    Lab Status:  Final result Specimen:  Blood from Arm, Left Updated:  04/29/19 2245     Blood Culture No growth at 5 days    Wound Culture - Wound, Abdominal Wall [[id number]]  (Abnormal)  Collected:  04/26/19 1350    Lab Status:  Final result Specimen:  Wound from Abdominal Wall Updated:  04/28/19 0821     Wound Culture Light growth (2+) Lactobacillus species     Comment: Lactobacillus species is a component of normal human sherly.  Clindamycin, Penicillin, and Ampicillin generally are the most active in vitro agents.  Resistance to Vancomycin and Aminoglycosides is reported.          Gram Stain Moderate (3+) Gram positive cocci in pairs      Moderate (3+) Gram variable bacilli      Moderate (3+) WBCs seen          Imaging Results (all)     Procedure Component Value Units Date/Time    CT Abdomen Pelvis With Contrast [[id number]] Collected:  04/24/19 2340     Updated:  04/24/19 2342    Narrative:       CT Abdomen Pelvis W    INDICATION:   Left lower quadrant abdominal wall swelling and redness for the past week    TECHNIQUE:   CT of the abdomen and pelvis with 95 cc of Isovue-300 contrast. Coronal and sagittal reconstructions were obtained.  Radiation dose reduction techniques included automated exposure control or exposure modulation based on body size. Count of known CT and  cardiac nuc med studies performed in previous 12 months: 1.     COMPARISON:   7/30/2018    FINDINGS:  Abdomen: No upper abdominal solid organ abnormalities are noted. Again noted incidentally is a left adrenal adenoma that is unchanged. There is no evidence of retroperitoneal adenopathy. There are no distended bowel loops. The appendix is normal.    Pelvis: Mild skin thickening is noted with subcutaneous edema seen in the left lower quadrant. The left groin inflammatory changes extend a little bit deeper. There is a more consolidated area of subcutaneous inflammation at the left groin measuring 2.5  cm in diameter. There is no evidence of a drainable abscess. Left inguinal lymph nodes are mildly prominent and are likely reactive. No adenopathy is seen in the pelvis itself.      Impression:       Cellulitis of the left lower  quadrant abdominal wall with more focal inflammatory change at the left groin. No drainable abscess is seen. There are reactive lymph nodes in the left inguinal region.          Signer Name: El Reyes MD   Signed: 4/24/2019 11:40 PM   Workstation Name: [id number]             Results for orders placed during the hospital encounter of 02/28/17   Echocardiogram 2D complete    Narrative · Left ventricular function is normal. Estimated EF = 65%.  · Left ventricular wall thickness is consistent with mild concentric   hypertrophy.  · All left ventricular wall segments contract normally.           Order Current Status    AFB Culture - Wound, Abdominal Wall Preliminary result    Fungus Culture - Wound, Abdominal Wall Preliminary result    Wound Culture - Drainage, Abdominal Wall Preliminary result          Discharge Details        Discharge Medications      New Medications      Instructions Start Date   amLODIPine 10 MG tablet  Commonly known as:  NORVASC   10 mg, Oral, Every 24 Hours Scheduled   Start Date:  5/4/2019     doxycycline 50 MG capsule  Commonly known as:  MONODOX   100 mg, Oral, 2 Times Daily      insulin detemir 100 UNIT/ML injection  Commonly known as:  LEVEMIR   46 Units, Subcutaneous, Every 12 Hours Scheduled      insulin lispro 100 UNIT/ML injection  Commonly known as:  humaLOG  Replaces:  Insulin Lispro 100 UNIT/ML solution pen-injector   20 Units, Subcutaneous, 3 Times Daily With Meals      insulin lispro 100 UNIT/ML injection  Commonly known as:  humaLOG   0-14 Units, Subcutaneous, 4 Times Daily With Meals & Nightly      lactobacillus acidophilus capsule capsule  Replaces:  probiotic capsule capsule   1 capsule, Oral, Daily   Start Date:  5/4/2019     Metoprolol Tartrate 75 MG tablet   75 mg, Oral, Every 12 Hours Scheduled      piperacillin-tazobactam 3-0.375 GM/50ML IVPB  Commonly known as:  ZOSYN   3.375 g, Intravenous, Every 8 Hours      tamsulosin 0.4 MG capsule 24 hr capsule  Commonly known as:  " FLOMAX   0.8 mg, Oral, Nightly         Changes to Medications      Instructions Start Date   metFORMIN 1000 MG tablet  Commonly known as:  GLUCOPHAGE  What changed:  when to take this   1,000 mg, Oral, 2 Times Daily With Meals         Continue These Medications      Instructions Start Date   accu-chek soft touch lancets   3 times a day      acetaminophen 325 MG tablet  Commonly known as:  TYLENOL   650 mg, Oral, Every 4 Hours PRN      DULoxetine 30 MG capsule  Commonly known as:  CYMBALTA   30 mg, Oral, Daily      DUREZOL 0.05 % ophthalmic emulsion  Generic drug:  difluprednate   No dose, route, or frequency recorded.      gabapentin 400 MG capsule  Commonly known as:  NEURONTIN   1,200 mg, Oral, 3 Times Daily      glucose monitor monitoring kit   1 each, Does not apply, Daily      HYDROcodone-acetaminophen  MG per tablet  Commonly known as:  NORCO   1 tablet, Oral, 3 Times Daily PRN      Insulin Pen Needle 33G X 4 MM misc  Commonly known as:  ADVOCATE INSULIN PEN NEEDLES   1 application, Does not apply, 3 Times Daily      Insulin Syringe-Needle U-100 27G X 5/8\" 1 ML misc   1 syringe, Does not apply, 2 Times Daily      lisinopril 40 MG tablet  Commonly known as:  PRINIVIL,ZESTRIL   40 mg, Oral, Daily         Stop These Medications    glucose blood test strip     Insulin Degludec 200 UNIT/ML solution pen-injector     Insulin Lispro 100 UNIT/ML solution pen-injector  Commonly known as:  HUMALOG  Replaced by:  insulin lispro 100 UNIT/ML injection     probiotic capsule capsule  Replaced by:  lactobacillus acidophilus capsule capsule     sodium-potassium-magnesium sulfates 17.5-3.13-1.6 GM/177ML solution oral solution  Commonly known as:  SUPREP BOWEL PREP KIT            Allergies   Allergen Reactions   • No Known Drug Allergy          Discharge Disposition:  Rehab Facility or Unit (DC - External)Transfer to [facility]     Discharge Diet:  Diet Order   Procedures   • Diet Regular; Thin; " Consistent Carbohydrate, Low Fat, Low Sodium; Low Fat (50gm); 1,500 mg Na       Discharge Activity:  As tolerates      CODE STATUS:   Code Status and Medical Interventions:   Ordered at: 04/25/19 0139     Code Status:    CPR     Medical Interventions (Level of Support Prior to Arrest):    Full           Additional Instructions for the Follow-ups that You Need to Schedule     Discharge Follow-up with PCP   As directed       Currently Documented PCP:    Karri Hawkins MD    PCP Phone Number:    [phone]     Follow Up Details:  within 1 week of d/c from [facility]         Discharge Follow-up with Specified Provider: Dr. Kern; 1 Week   As directed      To:  Dr. Kern    Follow Up:  1 Week                   Time Spent on Discharge:  45 minutes    Electronically signed by Melisa Boles MD, 05/03/19, 12:33 PM.

## 2019-05-03 NOTE — PROGRESS NOTES
[facility] Progress Note        Antibiotics:  Anti-Infectives (From admission, onward)    Ordered     Dose/Rate Route Frequency Start Stop    19 0855  DAPTOmycin (CUBICIN) 400 mg in sodium chloride 0.9 % 50 mL IVPB     Usman Dong MD reviewed the order on 19.   Ordering Provider:  Usman Dong MD    4 mg/kg × 94.3 kg (Adjusted)  100 mL/hr over 30 Minutes Intravenous Every 24 Hours Scheduled 19 1000 19 0859    19 0402  piperacillin-tazobactam (ZOSYN) 3.375 g in iso-osmotic dextrose 50 ml (premix)     Comments:  First dose given in ED,please time from that dose   Usman Dong MD reviewed the order on 19.   Ordering Provider:  Usman Dong MD    3.375 g  over 4 Hours Intravenous Every 8 Hours 19 0600 19 0559    19 2202  vancomycin 2250 mg/500 mL 0.9% NS IVPB ([facility])     Ordering Provider:  Coco Dietrich PA-C    20 mg/kg × 109 kg  200 mL/hr over 2.5 Hours Intravenous Once 19 2204 19 0140    19 2202  piperacillin-tazobactam (ZOSYN) 3.375 g in iso-osmotic dextrose 50 ml (premix)     Ordering Provider:  Coco Dietrich PA-C    3.375 g  over 30 Minutes Intravenous Once 19 2204 19 2254          CC: abd wall pain    HPI:    Patient is a 48 y.o.  Yr old male with history of h/o poorly contolled T2DM, DUNLAP/liver cirrhosis, HTN, PVD/Left BKA, and multiple skin abscesses/MRSA who presented to [facility] ED with right shin wound infection summer 2018.  He was unable to get to see Dr. Martinez as his father passed away unexpectedly on 18 and he had to wait until after the .  The wound worsened becoming gangrenous and he came to [facility] ED in 2018.  He also had been hospitalized at [facility] and then transferred to  for a severe penis/scrotum infection requiring surgical debridement in summer 2018.  He received antibiotics regarding his right leg infection, generally improved over the  "remainder of summer 2018 but that area had not completely healed.     He is admitted April 24, 2019 with acute left lower abdominal wall redness/swelling and pain, spontaneous drainage associated with fever/chills and uncontrolled blood sugars.     4/26 I&D requiring wide debridement     5/3/19 doing ok in general,  Pain is generally constant, sharp, nonradiating, worse with palpation, generally better with pain meds , better since surgery, worse with wound care and  not completely relieved and currently 4 out of 10.       No diarrhea.  No headache photophobia or neck stiffness.  No shortness of breath cough or hemoptysis.  No nausea vomiting or abdominal pain.  No dysuria hematuria or pyuria.     No  new redness swelling or pain at his right anterior shin. Small nonhealed area but no new deterioration         ROS:      5/3/19 No f/c/s. No n/v/d. No rash. No new ADR to Abx.     PE:   /79 (BP Location: Left arm, Patient Position: Lying)   Pulse 79   Temp 98 °F (36.7 °C) (Oral)   Resp 20   Ht 190.5 cm (75\")   Wt 109 kg (240 lb)   SpO2 95%   BMI 30.00 kg/m²       GENERAL: awake, in no acute distress.   HEENT: Normocephalic, atraumatic.  PERRL. EOMI. No conjunctival injection. No icterus. Oropharynx clear without evidence of thrush or exudate. No evidence of peridontal disease.    NECK: Supple without nuchal rigidity. No mass.  LYMPH: No cervical, axillary or inguinal lymphadenopathy.  HEART: RRR; No murmur, rubs, gallops.   LUNGS: Clear to auscultation bilaterally without wheezing, rales, rhonchi. Normal respiratory effort. Nonlabored. No dullness.  ABDOMEN: Soft, tender left lower quadrant near skin abnormality as outlined below, nondistended. Positive bowel sounds. No rebound or guarding. NO mass or HSM.  EXT:  No cyanosis, clubbing or edema. No cord.  : Genitalia generally unremarkable.  Without Walker catheter.  MSK: FROM without joint effusions noted arms/legs.    SKIN: Warm and dry without cutaneous " eruptions on Inspection/palpation.    NEURO: awake     Anterior abdominal wall,large surgical wound noted  and large area of redness/warmth and induration.  No discrete fluctuance.  No crepitus or bulla.  No dermatomal pattern. Unable to express any purulence     No peripheral stigmata/phenomena of endocarditis     Right lower leg with nonhealed area anterior shin through epidermis but no probe to bone tendon joint or ligament.  No redness induration or warmth.  No mass bulge or fluctuance.  No crepitus or bulla.  Some crust           Laboratory Data    Results from last 7 days   Lab Units 05/03/19  0419 04/30/19  0453 04/29/19  0503   WBC 10*3/mm3 13.17* 9.76 11.76*   HEMOGLOBIN g/dL 10.8* 11.5* 11.3*   HEMATOCRIT % 34.1* 36.0* 34.6*   PLATELETS 10*3/mm3 243 251 255     Results from last 7 days   Lab Units 05/03/19  0419   SODIUM mmol/L 135*   POTASSIUM mmol/L 4.7   CHLORIDE mmol/L 97*   CO2 mmol/L 28.0   BUN mg/dL 30*   CREATININE mg/dL 0.72*   GLUCOSE mg/dL 255*   CALCIUM mg/dL 8.8     Results from last 7 days   Lab Units 05/03/19  0419   ALK PHOS U/L 152*   BILIRUBIN mg/dL 0.2   ALT (SGPT) U/L 21   AST (SGOT) U/L 15               Estimated Creatinine Clearance: 163.7 mL/min (A) (by C-G formula based on SCr of 0.72 mg/dL (L)).      Microbiology:      Radiology:  Imaging Results (last 72 hours)     Procedure Component Value Units Date/Time    CT Abdomen Pelvis With Contrast [[id number]] Collected:  04/24/19 2340     Updated:  04/24/19 2342    Narrative:       CT Abdomen Pelvis W    INDICATION:   Left lower quadrant abdominal wall swelling and redness for the past week    TECHNIQUE:   CT of the abdomen and pelvis with 95 cc of Isovue-300 contrast. Coronal and sagittal reconstructions were obtained.  Radiation dose reduction techniques included automated exposure control or exposure modulation based on body size. Count of known CT and  cardiac nuc med studies performed in previous 12 months: 1.     COMPARISON:    7/30/2018    FINDINGS:  Abdomen: No upper abdominal solid organ abnormalities are noted. Again noted incidentally is a left adrenal adenoma that is unchanged. There is no evidence of retroperitoneal adenopathy. There are no distended bowel loops. The appendix is normal.    Pelvis: Mild skin thickening is noted with subcutaneous edema seen in the left lower quadrant. The left groin inflammatory changes extend a little bit deeper. There is a more consolidated area of subcutaneous inflammation at the left groin measuring 2.5  cm in diameter. There is no evidence of a drainable abscess. Left inguinal lymph nodes are mildly prominent and are likely reactive. No adenopathy is seen in the pelvis itself.      Impression:       Cellulitis of the left lower quadrant abdominal wall with more focal inflammatory change at the left groin. No drainable abscess is seen. There are reactive lymph nodes in the left inguinal region.          Signer Name: El Reyes MD   Signed: 4/24/2019 11:40 PM   Workstation Name: [id number]               Impression:      --Acute anterior abdominal wall, left lower quadrant cellulitis/abscess s/p I&D 4/26 requiring wide debridement;  D/w Dr Kern.  Surgery following to determine timing/options/threshold for further surgical debridement.  This is further complicated by uncontrolled diabetes and past history of prior severe soft tissue infections.  Broad antimicrobials ongoing empirically with daptomycin/Zosyn.  Taper once further microbiologic data     --Diabetes mellitus type 2, uncontrolled.  You need to tightly control blood sugar to give best chance for healing     --History right lower leg infection.  Chronic nonhealed area anterior shin with ongoing skin care.  No obvious secondary infection at present.  Monitor.  Any specific timing/option or threshold for further vascular work-up per vascular team at their discretion     --History MRSA     --History DUNLAP and chronic liver disease per  "past notes     --Prior left BKA     --Peripheral vascular disease.  Chronic and current extent/severity unclear and would need to be clarified by vascular team at their discretion           PLAN:     --IV daptomycin/Zosyn;  Waiting on culture data/clinical course to consider tapering     --Check/review labs cultures and scans     --Highly complex set of issues with high risk for further serious morbidity and other serious sequela     --Discussed with microbiology and history per nursing staff; surgical team following    **microbiology is working up cultures, originally reported by them as \"mixed gram positive sherly\";  I have asked them to evaluate for S Aureus/pathogenic strep/enterococcus -v-  gm+ skin sherly/CN staph etc..AS OF 5/2 no S Aureus, no enterococcus and likely mixture of CN staph species and strep, otherwise pending final data    --d/w Dr Dotson;  IF [facility] I anticipate tapering to zosyn/doxycycline for disposition, then consider further adjustments depending on final culture data;  Wound care outlined by Dr Erlin Dong MD  5/3/2019        "

## 2019-05-03 NOTE — PROGRESS NOTES
"General Surgery Post op Follow Up Note    Subjective:   No new complaints.    /79 (BP Location: Left arm, Patient Position: Lying)   Pulse 79   Temp 98 °F (36.7 °C) (Oral)   Resp 20   Ht 190.5 cm (75\")   Wt 109 kg (240 lb)   SpO2 95%   BMI 30.00 kg/m²     General Appearance:  in no acute distress  Abdomen: abdominal wound ok, no expressible purulent material    CBC  Results from last 7 days   Lab Units 05/03/19  0419   WBC 10*3/mm3 13.17*   HEMOGLOBIN g/dL 10.8*   HEMATOCRIT % 34.1*   PLATELETS 10*3/mm3 243       CMP/BMP  Results from last 7 days   Lab Units 05/03/19  0419   SODIUM mmol/L 135*   POTASSIUM mmol/L 4.7   CHLORIDE mmol/L 97*   CO2 mmol/L 28.0   BUN mg/dL 30*   CREATININE mg/dL 0.72*   CALCIUM mg/dL 8.8   BILIRUBIN mg/dL 0.2   ALK PHOS U/L 152*   ALT (SGPT) U/L 21   AST (SGOT) U/L 15   GLUCOSE mg/dL 255*         ASSESSMENT/PLAN:    Leukocytosis.  The wound needs to be packed with a Kerlix into the tunnel inferiorly and laterally.  I discussed wound care with nurse AJ.  Need to continue good local wound care if this is going to heal.  Pack the entire wound (including the tunnels) BID with betadine.  May go to rehab.     Fadi Kern MD  5/3/2019  8:10 AM  "

## 2019-05-18 ENCOUNTER — HOSPITAL ENCOUNTER (INPATIENT)
Facility: HOSPITAL | Age: 49
LOS: 9 days | Discharge: HOME | End: 2019-05-28
Attending: EMERGENCY MEDICINE | Admitting: SURGERY

## 2019-05-18 DIAGNOSIS — Z89.512 S/P BKA (BELOW KNEE AMPUTATION), LEFT (HCC): ICD-10-CM

## 2019-05-18 DIAGNOSIS — J90 BILATERAL PLEURAL EFFUSION: ICD-10-CM

## 2019-05-18 DIAGNOSIS — R60.1 ANASARCA: Primary | ICD-10-CM

## 2019-05-18 DIAGNOSIS — Z74.09 IMPAIRED MOBILITY AND ADLS: ICD-10-CM

## 2019-05-18 DIAGNOSIS — IMO0002 DM (DIABETES MELLITUS) TYPE II UNCONTROLLED, PERIPH VASCULAR DISORDER: ICD-10-CM

## 2019-05-18 DIAGNOSIS — L03.311 ABDOMINAL WALL CELLULITIS: ICD-10-CM

## 2019-05-18 DIAGNOSIS — Z78.9 IMPAIRED MOBILITY AND ADLS: ICD-10-CM

## 2019-05-18 DIAGNOSIS — E11.622 DIABETIC ULCER OF RIGHT LOWER LEG (HCC): ICD-10-CM

## 2019-05-18 DIAGNOSIS — I73.9 PERIPHERAL VASCULAR DISEASE (HCC): ICD-10-CM

## 2019-05-18 DIAGNOSIS — L03.115 CELLULITIS OF RIGHT ANTERIOR LOWER LEG: ICD-10-CM

## 2019-05-18 DIAGNOSIS — Z86.14 HISTORY OF MRSA INFECTION: ICD-10-CM

## 2019-05-18 DIAGNOSIS — T14.8XXA WOUND INFECTION: ICD-10-CM

## 2019-05-18 DIAGNOSIS — E66.9 OBESITY (BMI 30-39.9): ICD-10-CM

## 2019-05-18 DIAGNOSIS — L08.9 WOUND INFECTION: ICD-10-CM

## 2019-05-18 DIAGNOSIS — Z74.09 IMPAIRED FUNCTIONAL MOBILITY, BALANCE, GAIT, AND ENDURANCE: ICD-10-CM

## 2019-05-18 DIAGNOSIS — L97.919 DIABETIC ULCER OF RIGHT LOWER LEG (HCC): ICD-10-CM

## 2019-05-18 PROCEDURE — 99285 EMERGENCY DEPT VISIT HI MDM: CPT

## 2019-05-18 PROCEDURE — 83880 ASSAY OF NATRIURETIC PEPTIDE: CPT | Performed by: EMERGENCY MEDICINE

## 2019-05-18 PROCEDURE — 83690 ASSAY OF LIPASE: CPT | Performed by: EMERGENCY MEDICINE

## 2019-05-18 PROCEDURE — 84484 ASSAY OF TROPONIN QUANT: CPT | Performed by: INTERNAL MEDICINE

## 2019-05-18 PROCEDURE — 85025 COMPLETE CBC W/AUTO DIFF WBC: CPT | Performed by: EMERGENCY MEDICINE

## 2019-05-18 PROCEDURE — 84484 ASSAY OF TROPONIN QUANT: CPT | Performed by: EMERGENCY MEDICINE

## 2019-05-18 PROCEDURE — 80053 COMPREHEN METABOLIC PANEL: CPT | Performed by: EMERGENCY MEDICINE

## 2019-05-18 RX ORDER — SODIUM CHLORIDE 0.9 % (FLUSH) 0.9 %
10 SYRINGE (ML) INJECTION AS NEEDED
Status: DISCONTINUED | OUTPATIENT
Start: 2019-05-18 | End: 2019-05-28 | Stop reason: HOSPADM

## 2019-05-19 ENCOUNTER — APPOINTMENT (OUTPATIENT)
Dept: CARDIOLOGY | Facility: HOSPITAL | Age: 49
End: 2019-05-19

## 2019-05-19 ENCOUNTER — APPOINTMENT (OUTPATIENT)
Dept: CT IMAGING | Facility: HOSPITAL | Age: 49
End: 2019-05-19

## 2019-05-19 ENCOUNTER — APPOINTMENT (OUTPATIENT)
Dept: GENERAL RADIOLOGY | Facility: HOSPITAL | Age: 49
End: 2019-05-19

## 2019-05-19 PROBLEM — R60.1 ANASARCA: Status: ACTIVE | Noted: 2019-05-19

## 2019-05-19 LAB
ALBUMIN SERPL-MCNC: 3.5 G/DL (ref 3.5–5.2)
ALBUMIN SERPL-MCNC: 3.6 G/DL (ref 3.5–5.2)
ALBUMIN/GLOB SERPL: 1 G/DL
ALBUMIN/GLOB SERPL: 1 G/DL
ALP SERPL-CCNC: 128 U/L (ref 39–117)
ALP SERPL-CCNC: 142 U/L (ref 39–117)
ALT SERPL W P-5'-P-CCNC: 17 U/L (ref 1–41)
ALT SERPL W P-5'-P-CCNC: 19 U/L (ref 1–41)
ANION GAP SERPL CALCULATED.3IONS-SCNC: 10 MMOL/L
ANION GAP SERPL CALCULATED.3IONS-SCNC: 12 MMOL/L
APTT PPP: 33.8 SECONDS (ref 24–37)
AST SERPL-CCNC: 14 U/L (ref 1–40)
AST SERPL-CCNC: 14 U/L (ref 1–40)
BASOPHILS # BLD AUTO: 0.03 10*3/MM3 (ref 0–0.2)
BASOPHILS # BLD AUTO: 0.03 10*3/MM3 (ref 0–0.2)
BASOPHILS NFR BLD AUTO: 0.4 % (ref 0–1.5)
BASOPHILS NFR BLD AUTO: 0.4 % (ref 0–1.5)
BH CV ECHO MEAS - AO MAX PG (FULL): 5.3 MMHG
BH CV ECHO MEAS - AO MAX PG: 8.6 MMHG
BH CV ECHO MEAS - AO MEAN PG (FULL): 3.8 MMHG
BH CV ECHO MEAS - AO MEAN PG: 5 MMHG
BH CV ECHO MEAS - AO ROOT AREA (BSA CORRECTED): 1.4
BH CV ECHO MEAS - AO ROOT AREA: 8.7 CM^2
BH CV ECHO MEAS - AO ROOT DIAM: 3.3 CM
BH CV ECHO MEAS - AO V2 MAX: 146.6 CM/SEC
BH CV ECHO MEAS - AO V2 MEAN: 105 CM/SEC
BH CV ECHO MEAS - AO V2 VTI: 33 CM
BH CV ECHO MEAS - AVA(I,A): 2.5 CM^2
BH CV ECHO MEAS - AVA(I,D): 2.5 CM^2
BH CV ECHO MEAS - AVA(V,A): 2.8 CM^2
BH CV ECHO MEAS - AVA(V,D): 2.8 CM^2
BH CV ECHO MEAS - BSA(HAYCOCK): 2.5 M^2
BH CV ECHO MEAS - BSA: 2.5 M^2
BH CV ECHO MEAS - BZI_BMI: 32.5 KILOGRAMS/M^2
BH CV ECHO MEAS - BZI_METRIC_HEIGHT: 190.5 CM
BH CV ECHO MEAS - BZI_METRIC_WEIGHT: 117.9 KG
BH CV ECHO MEAS - EDV(CUBED): 111.3 ML
BH CV ECHO MEAS - EDV(MOD-SP2): 97 ML
BH CV ECHO MEAS - EDV(MOD-SP4): 64 ML
BH CV ECHO MEAS - EDV(TEICH): 108.1 ML
BH CV ECHO MEAS - EF(CUBED): 72.3 %
BH CV ECHO MEAS - EF(MOD-BP): 62 %
BH CV ECHO MEAS - EF(MOD-SP2): 63.9 %
BH CV ECHO MEAS - EF(MOD-SP4): 53.1 %
BH CV ECHO MEAS - EF(TEICH): 63.9 %
BH CV ECHO MEAS - ESV(CUBED): 30.8 ML
BH CV ECHO MEAS - ESV(MOD-SP2): 35 ML
BH CV ECHO MEAS - ESV(MOD-SP4): 30 ML
BH CV ECHO MEAS - ESV(TEICH): 39 ML
BH CV ECHO MEAS - FS: 34.8 %
BH CV ECHO MEAS - IVS/LVPW: 1
BH CV ECHO MEAS - IVSD: 1.2 CM
BH CV ECHO MEAS - LAD MAJOR: 4.8 CM
BH CV ECHO MEAS - LAT PEAK E' VEL: 7.7 CM/SEC
BH CV ECHO MEAS - LATERAL E/E' RATIO: 13.2
BH CV ECHO MEAS - LV DIASTOLIC VOL/BSA (35-75): 26.1 ML/M^2
BH CV ECHO MEAS - LV MASS(C)D: 209 GRAMS
BH CV ECHO MEAS - LV MASS(C)DI: 85.2 GRAMS/M^2
BH CV ECHO MEAS - LV MAX PG: 3.3 MMHG
BH CV ECHO MEAS - LV MEAN PG: 1.2 MMHG
BH CV ECHO MEAS - LV SYSTOLIC VOL/BSA (12-30): 12.2 ML/M^2
BH CV ECHO MEAS - LV V1 MAX: 90.8 CM/SEC
BH CV ECHO MEAS - LV V1 MEAN: 49.4 CM/SEC
BH CV ECHO MEAS - LV V1 VTI: 18.3 CM
BH CV ECHO MEAS - LVIDD: 4.8 CM
BH CV ECHO MEAS - LVIDS: 3.1 CM
BH CV ECHO MEAS - LVLD AP2: 9.6 CM
BH CV ECHO MEAS - LVLD AP4: 8.4 CM
BH CV ECHO MEAS - LVLS AP2: 7.7 CM
BH CV ECHO MEAS - LVLS AP4: 7.4 CM
BH CV ECHO MEAS - LVOT AREA (M): 4.5 CM^2
BH CV ECHO MEAS - LVOT AREA: 4.5 CM^2
BH CV ECHO MEAS - LVOT DIAM: 2.4 CM
BH CV ECHO MEAS - LVPWD: 1.1 CM
BH CV ECHO MEAS - MED PEAK E' VEL: 10.9 CM/SEC
BH CV ECHO MEAS - MEDIAL E/E' RATIO: 9.4
BH CV ECHO MEAS - MV A MAX VEL: 47.9 CM/SEC
BH CV ECHO MEAS - MV DEC TIME: 0.18 SEC
BH CV ECHO MEAS - MV E MAX VEL: 103.7 CM/SEC
BH CV ECHO MEAS - MV E/A: 2.2
BH CV ECHO MEAS - MV MAX PG: 7.8 MMHG
BH CV ECHO MEAS - MV MEAN PG: 3.1 MMHG
BH CV ECHO MEAS - MV V2 MAX: 139.6 CM/SEC
BH CV ECHO MEAS - MV V2 MEAN: 81.2 CM/SEC
BH CV ECHO MEAS - MV V2 VTI: 41.4 CM
BH CV ECHO MEAS - MVA(VTI): 2 CM^2
BH CV ECHO MEAS - PA ACC SLOPE: 910 CM/SEC^2
BH CV ECHO MEAS - PA ACC TIME: 0.1 SEC
BH CV ECHO MEAS - PA MAX PG: 3.8 MMHG
BH CV ECHO MEAS - PA PR(ACCEL): 33.8 MMHG
BH CV ECHO MEAS - PA V2 MAX: 96.4 CM/SEC
BH CV ECHO MEAS - RVSP: 29 MMHG
BH CV ECHO MEAS - SI(AO): 116.9 ML/M^2
BH CV ECHO MEAS - SI(CUBED): 32.8 ML/M^2
BH CV ECHO MEAS - SI(LVOT): 33.2 ML/M^2
BH CV ECHO MEAS - SI(MOD-SP2): 25.3 ML/M^2
BH CV ECHO MEAS - SI(MOD-SP4): 13.9 ML/M^2
BH CV ECHO MEAS - SI(TEICH): 28.2 ML/M^2
BH CV ECHO MEAS - SV(AO): 286.8 ML
BH CV ECHO MEAS - SV(CUBED): 80.5 ML
BH CV ECHO MEAS - SV(LVOT): 81.6 ML
BH CV ECHO MEAS - SV(MOD-SP2): 62 ML
BH CV ECHO MEAS - SV(MOD-SP4): 34 ML
BH CV ECHO MEAS - SV(TEICH): 69.1 ML
BH CV ECHO MEAS - TAPSE (>1.6): 2.5 CM2
BH CV ECHO MEAS - TR MAX PG: 26 MMHG
BH CV ECHO MEAS - TR MAX VEL: 256.3 CM/SEC
BH CV ECHO MEASUREMENTS AVERAGE E/E' RATIO: 11.15
BH CV VAS BP RIGHT ARM: NORMAL MMHG
BH CV XLRA - RV BASE: 2.7 CM
BH CV XLRA - RV LENGTH: 7.8 CM
BH CV XLRA - RV MID: 2.2 CM
BH CV XLRA - TDI S': 14.5 CM/SEC
BILIRUB SERPL-MCNC: 0.3 MG/DL (ref 0.2–1.2)
BILIRUB SERPL-MCNC: 0.3 MG/DL (ref 0.2–1.2)
BILIRUB UR QL STRIP: NEGATIVE
BUN BLD-MCNC: 34 MG/DL (ref 6–20)
BUN BLD-MCNC: 34 MG/DL (ref 6–20)
BUN/CREAT SERPL: 46.6 (ref 7–25)
BUN/CREAT SERPL: 47.2 (ref 7–25)
CALCIUM SPEC-SCNC: 9.2 MG/DL (ref 8.6–10.5)
CALCIUM SPEC-SCNC: 9.3 MG/DL (ref 8.6–10.5)
CHLORIDE SERPL-SCNC: 101 MMOL/L (ref 98–107)
CHLORIDE SERPL-SCNC: 103 MMOL/L (ref 98–107)
CLARITY UR: CLEAR
CO2 SERPL-SCNC: 29 MMOL/L (ref 22–29)
CO2 SERPL-SCNC: 29 MMOL/L (ref 22–29)
COLOR UR: YELLOW
CREAT BLD-MCNC: 0.72 MG/DL (ref 0.76–1.27)
CREAT BLD-MCNC: 0.73 MG/DL (ref 0.76–1.27)
CREAT UR-MCNC: 33.3 MG/DL
D-LACTATE SERPL-SCNC: 1.1 MMOL/L (ref 0.5–2)
DEPRECATED RDW RBC AUTO: 47.3 FL (ref 37–54)
DEPRECATED RDW RBC AUTO: 47.9 FL (ref 37–54)
EOSINOPHIL # BLD AUTO: 0.29 10*3/MM3 (ref 0–0.4)
EOSINOPHIL # BLD AUTO: 0.32 10*3/MM3 (ref 0–0.4)
EOSINOPHIL NFR BLD AUTO: 4 % (ref 0.3–6.2)
EOSINOPHIL NFR BLD AUTO: 4.5 % (ref 0.3–6.2)
ERYTHROCYTE [DISTWIDTH] IN BLOOD BY AUTOMATED COUNT: 14.5 % (ref 12.3–15.4)
ERYTHROCYTE [DISTWIDTH] IN BLOOD BY AUTOMATED COUNT: 14.7 % (ref 12.3–15.4)
GFR SERPL CREATININE-BSD FRML MDRD: 114 ML/MIN/1.73
GFR SERPL CREATININE-BSD FRML MDRD: 116 ML/MIN/1.73
GLOBULIN UR ELPH-MCNC: 3.5 GM/DL
GLOBULIN UR ELPH-MCNC: 3.6 GM/DL
GLUCOSE BLD-MCNC: 157 MG/DL (ref 65–99)
GLUCOSE BLD-MCNC: 159 MG/DL (ref 65–99)
GLUCOSE BLDC GLUCOMTR-MCNC: 134 MG/DL (ref 70–130)
GLUCOSE BLDC GLUCOMTR-MCNC: 146 MG/DL (ref 70–130)
GLUCOSE BLDC GLUCOMTR-MCNC: 151 MG/DL (ref 70–130)
GLUCOSE BLDC GLUCOMTR-MCNC: 159 MG/DL (ref 70–130)
GLUCOSE UR STRIP-MCNC: NEGATIVE MG/DL
HCT VFR BLD AUTO: 31.7 % (ref 37.5–51)
HCT VFR BLD AUTO: 32.2 % (ref 37.5–51)
HGB BLD-MCNC: 10.2 G/DL (ref 13–17.7)
HGB BLD-MCNC: 9.9 G/DL (ref 13–17.7)
HGB UR QL STRIP.AUTO: NEGATIVE
HOLD SPECIMEN: NORMAL
HOLD SPECIMEN: NORMAL
IMM GRANULOCYTES # BLD AUTO: 0.01 10*3/MM3 (ref 0–0.05)
IMM GRANULOCYTES # BLD AUTO: 0.02 10*3/MM3 (ref 0–0.05)
IMM GRANULOCYTES NFR BLD AUTO: 0.1 % (ref 0–0.5)
IMM GRANULOCYTES NFR BLD AUTO: 0.3 % (ref 0–0.5)
INR PPP: 1.13 (ref 0.85–1.16)
KETONES UR QL STRIP: NEGATIVE
LEFT ATRIUM VOLUME INDEX: 25.7 ML/M^2
LEFT ATRIUM VOLUME: 63 ML
LEUKOCYTE ESTERASE UR QL STRIP.AUTO: NEGATIVE
LIPASE SERPL-CCNC: 33 U/L (ref 13–60)
LV EF 2D ECHO EST: 60 %
LYMPHOCYTES # BLD AUTO: 1.67 10*3/MM3 (ref 0.7–3.1)
LYMPHOCYTES # BLD AUTO: 1.78 10*3/MM3 (ref 0.7–3.1)
LYMPHOCYTES NFR BLD AUTO: 23.4 % (ref 19.6–45.3)
LYMPHOCYTES NFR BLD AUTO: 24.3 % (ref 19.6–45.3)
MAXIMAL PREDICTED HEART RATE: 170 BPM
MCH RBC QN AUTO: 27.7 PG (ref 26.6–33)
MCH RBC QN AUTO: 28 PG (ref 26.6–33)
MCHC RBC AUTO-ENTMCNC: 31.2 G/DL (ref 31.5–35.7)
MCHC RBC AUTO-ENTMCNC: 31.7 G/DL (ref 31.5–35.7)
MCV RBC AUTO: 88.5 FL (ref 79–97)
MCV RBC AUTO: 88.8 FL (ref 79–97)
MONOCYTES # BLD AUTO: 0.41 10*3/MM3 (ref 0.1–0.9)
MONOCYTES # BLD AUTO: 0.61 10*3/MM3 (ref 0.1–0.9)
MONOCYTES NFR BLD AUTO: 5.6 % (ref 5–12)
MONOCYTES NFR BLD AUTO: 8.6 % (ref 5–12)
NEUTROPHILS # BLD AUTO: 4.48 10*3/MM3 (ref 1.7–7)
NEUTROPHILS # BLD AUTO: 4.8 10*3/MM3 (ref 1.7–7)
NEUTROPHILS NFR BLD AUTO: 62.8 % (ref 42.7–76)
NEUTROPHILS NFR BLD AUTO: 65.6 % (ref 42.7–76)
NITRITE UR QL STRIP: NEGATIVE
NT-PROBNP SERPL-MCNC: 419.9 PG/ML (ref 5–900)
OSMOLALITY UR: 484 MOSM/KG (ref 300–1100)
PH UR STRIP.AUTO: <=5 [PH] (ref 5–8)
PLAT MORPH BLD: NORMAL
PLATELET # BLD AUTO: 158 10*3/MM3 (ref 140–450)
PLATELET # BLD AUTO: 162 10*3/MM3 (ref 140–450)
PMV BLD AUTO: 11 FL (ref 6–12)
PMV BLD AUTO: 11.5 FL (ref 6–12)
POTASSIUM BLD-SCNC: 4.6 MMOL/L (ref 3.5–5.2)
POTASSIUM BLD-SCNC: 4.8 MMOL/L (ref 3.5–5.2)
PROT SERPL-MCNC: 7 G/DL (ref 6–8.5)
PROT SERPL-MCNC: 7.2 G/DL (ref 6–8.5)
PROT UR QL STRIP: NEGATIVE
PROT UR-MCNC: 5.2 MG/DL
PROTHROMBIN TIME: 14 SECONDS (ref 11.2–14.3)
RBC # BLD AUTO: 3.57 10*6/MM3 (ref 4.14–5.8)
RBC # BLD AUTO: 3.64 10*6/MM3 (ref 4.14–5.8)
RBC MORPH BLD: NORMAL
SODIUM BLD-SCNC: 142 MMOL/L (ref 136–145)
SODIUM BLD-SCNC: 142 MMOL/L (ref 136–145)
SODIUM UR-SCNC: 116 MMOL/L
SP GR UR STRIP: 1.01 (ref 1–1.03)
STRESS TARGET HR: 145 BPM
TROPONIN T SERPL-MCNC: 0.02 NG/ML (ref 0–0.03)
TROPONIN T SERPL-MCNC: 0.02 NG/ML (ref 0–0.03)
UROBILINOGEN UR QL STRIP: NORMAL
WBC MORPH BLD: NORMAL
WBC NRBC COR # BLD: 7.13 10*3/MM3 (ref 3.4–10.8)
WBC NRBC COR # BLD: 7.32 10*3/MM3 (ref 3.4–10.8)
WHOLE BLOOD HOLD SPECIMEN: NORMAL
WHOLE BLOOD HOLD SPECIMEN: NORMAL

## 2019-05-19 PROCEDURE — 25010000002 FUROSEMIDE PER 20 MG: Performed by: INTERNAL MEDICINE

## 2019-05-19 PROCEDURE — 84300 ASSAY OF URINE SODIUM: CPT | Performed by: INTERNAL MEDICINE

## 2019-05-19 PROCEDURE — 25010000002 ENOXAPARIN PER 10 MG: Performed by: INTERNAL MEDICINE

## 2019-05-19 PROCEDURE — 93306 TTE W/DOPPLER COMPLETE: CPT

## 2019-05-19 PROCEDURE — 93010 ELECTROCARDIOGRAM REPORT: CPT | Performed by: INTERNAL MEDICINE

## 2019-05-19 PROCEDURE — 71045 X-RAY EXAM CHEST 1 VIEW: CPT

## 2019-05-19 PROCEDURE — 84156 ASSAY OF PROTEIN URINE: CPT | Performed by: INTERNAL MEDICINE

## 2019-05-19 PROCEDURE — 25010000002 PIPERACILLIN SOD-TAZOBACTAM PER 1 G: Performed by: INTERNAL MEDICINE

## 2019-05-19 PROCEDURE — 63710000001 INSULIN DETEMIR PER 5 UNITS: Performed by: INTERNAL MEDICINE

## 2019-05-19 PROCEDURE — 93306 TTE W/DOPPLER COMPLETE: CPT | Performed by: INTERNAL MEDICINE

## 2019-05-19 PROCEDURE — 80053 COMPREHEN METABOLIC PANEL: CPT | Performed by: INTERNAL MEDICINE

## 2019-05-19 PROCEDURE — 82962 GLUCOSE BLOOD TEST: CPT

## 2019-05-19 PROCEDURE — 85730 THROMBOPLASTIN TIME PARTIAL: CPT | Performed by: INTERNAL MEDICINE

## 2019-05-19 PROCEDURE — 63710000001 INSULIN LISPRO (HUMAN) PER 5 UNITS: Performed by: INTERNAL MEDICINE

## 2019-05-19 PROCEDURE — 25010000002 FUROSEMIDE PER 20 MG: Performed by: EMERGENCY MEDICINE

## 2019-05-19 PROCEDURE — 99223 1ST HOSP IP/OBS HIGH 75: CPT | Performed by: INTERNAL MEDICINE

## 2019-05-19 PROCEDURE — 82570 ASSAY OF URINE CREATININE: CPT | Performed by: INTERNAL MEDICINE

## 2019-05-19 PROCEDURE — 83605 ASSAY OF LACTIC ACID: CPT | Performed by: INTERNAL MEDICINE

## 2019-05-19 PROCEDURE — 93005 ELECTROCARDIOGRAM TRACING: CPT | Performed by: INTERNAL MEDICINE

## 2019-05-19 PROCEDURE — 85025 COMPLETE CBC W/AUTO DIFF WBC: CPT | Performed by: INTERNAL MEDICINE

## 2019-05-19 PROCEDURE — 81003 URINALYSIS AUTO W/O SCOPE: CPT | Performed by: INTERNAL MEDICINE

## 2019-05-19 PROCEDURE — 85610 PROTHROMBIN TIME: CPT | Performed by: INTERNAL MEDICINE

## 2019-05-19 PROCEDURE — 85007 BL SMEAR W/DIFF WBC COUNT: CPT | Performed by: INTERNAL MEDICINE

## 2019-05-19 PROCEDURE — 83935 ASSAY OF URINE OSMOLALITY: CPT | Performed by: INTERNAL MEDICINE

## 2019-05-19 PROCEDURE — 74176 CT ABD & PELVIS W/O CONTRAST: CPT

## 2019-05-19 RX ORDER — CLONIDINE HYDROCHLORIDE 0.1 MG/1
0.1 TABLET ORAL DAILY
Status: DISCONTINUED | OUTPATIENT
Start: 2019-05-19 | End: 2019-05-25

## 2019-05-19 RX ORDER — DIAPER,BRIEF,INFANT-TODD,DISP
1 EACH MISCELLANEOUS 2 TIMES DAILY
COMMUNITY
End: 2019-05-28 | Stop reason: HOSPADM

## 2019-05-19 RX ORDER — SODIUM CHLORIDE 0.9 % (FLUSH) 0.9 %
1-10 SYRINGE (ML) INJECTION AS NEEDED
Status: DISCONTINUED | OUTPATIENT
Start: 2019-05-19 | End: 2019-05-28 | Stop reason: HOSPADM

## 2019-05-19 RX ORDER — L.ACID,PARA/B.BIFIDUM/S.THERM 8B CELL
1 CAPSULE ORAL DAILY
Status: DISCONTINUED | OUTPATIENT
Start: 2019-05-19 | End: 2019-05-28 | Stop reason: HOSPADM

## 2019-05-19 RX ORDER — SENNOSIDES 8.6 MG
1 TABLET ORAL NIGHTLY
COMMUNITY
End: 2019-06-11

## 2019-05-19 RX ORDER — NICOTINE POLACRILEX 4 MG
15 LOZENGE BUCCAL
Status: DISCONTINUED | OUTPATIENT
Start: 2019-05-19 | End: 2019-05-28 | Stop reason: HOSPADM

## 2019-05-19 RX ORDER — DOXYCYCLINE 100 MG/1
100 CAPSULE ORAL EVERY 12 HOURS SCHEDULED
Status: DISCONTINUED | OUTPATIENT
Start: 2019-05-19 | End: 2019-05-20

## 2019-05-19 RX ORDER — TERAZOSIN 2 MG/1
8 CAPSULE ORAL NIGHTLY
COMMUNITY
End: 2019-06-11

## 2019-05-19 RX ORDER — CLONIDINE HYDROCHLORIDE 0.1 MG/1
0.1 TABLET ORAL DAILY
COMMUNITY
End: 2019-05-28 | Stop reason: HOSPADM

## 2019-05-19 RX ORDER — NYSTATIN 100000 [USP'U]/G
1 POWDER TOPICAL EVERY 12 HOURS SCHEDULED
Status: DISCONTINUED | OUTPATIENT
Start: 2019-05-19 | End: 2019-05-28 | Stop reason: HOSPADM

## 2019-05-19 RX ORDER — DULOXETIN HYDROCHLORIDE 30 MG/1
30 CAPSULE, DELAYED RELEASE ORAL DAILY
Status: DISCONTINUED | OUTPATIENT
Start: 2019-05-19 | End: 2019-05-28 | Stop reason: HOSPADM

## 2019-05-19 RX ORDER — GABAPENTIN 400 MG/1
1200 CAPSULE ORAL 3 TIMES DAILY
Status: DISCONTINUED | OUTPATIENT
Start: 2019-05-19 | End: 2019-05-28 | Stop reason: HOSPADM

## 2019-05-19 RX ORDER — DEXTROSE MONOHYDRATE 25 G/50ML
25 INJECTION, SOLUTION INTRAVENOUS
Status: DISCONTINUED | OUTPATIENT
Start: 2019-05-19 | End: 2019-05-28 | Stop reason: HOSPADM

## 2019-05-19 RX ORDER — HYDROCODONE BITARTRATE AND ACETAMINOPHEN 10; 325 MG/1; MG/1
1 TABLET ORAL 3 TIMES DAILY PRN
Status: DISCONTINUED | OUTPATIENT
Start: 2019-05-19 | End: 2019-05-26

## 2019-05-19 RX ORDER — SODIUM CHLORIDE 0.9 % (FLUSH) 0.9 %
3 SYRINGE (ML) INJECTION EVERY 12 HOURS SCHEDULED
Status: DISCONTINUED | OUTPATIENT
Start: 2019-05-19 | End: 2019-05-28 | Stop reason: HOSPADM

## 2019-05-19 RX ORDER — LISINOPRIL 20 MG/1
20 TABLET ORAL
Status: DISCONTINUED | OUTPATIENT
Start: 2019-05-19 | End: 2019-05-28 | Stop reason: HOSPADM

## 2019-05-19 RX ORDER — FUROSEMIDE 10 MG/ML
60 INJECTION INTRAMUSCULAR; INTRAVENOUS ONCE
Status: COMPLETED | OUTPATIENT
Start: 2019-05-19 | End: 2019-05-19

## 2019-05-19 RX ORDER — FUROSEMIDE 40 MG/1
40 TABLET ORAL DAILY
COMMUNITY
End: 2019-05-28 | Stop reason: HOSPADM

## 2019-05-19 RX ORDER — POTASSIUM CHLORIDE 750 MG/1
20 TABLET, FILM COATED, EXTENDED RELEASE ORAL 2 TIMES DAILY
COMMUNITY
End: 2019-05-28 | Stop reason: HOSPADM

## 2019-05-19 RX ORDER — DOCUSATE SODIUM 100 MG/1
100 CAPSULE, LIQUID FILLED ORAL 2 TIMES DAILY PRN
Status: DISCONTINUED | OUTPATIENT
Start: 2019-05-19 | End: 2019-05-28 | Stop reason: HOSPADM

## 2019-05-19 RX ORDER — DOXYCYCLINE HYCLATE 100 MG/1
100 CAPSULE ORAL 2 TIMES DAILY
COMMUNITY
End: 2019-05-28 | Stop reason: HOSPADM

## 2019-05-19 RX ORDER — CHOLECALCIFEROL (VITAMIN D3) 125 MCG
5 CAPSULE ORAL NIGHTLY
COMMUNITY
End: 2019-07-30

## 2019-05-19 RX ORDER — FUROSEMIDE 10 MG/ML
40 INJECTION INTRAMUSCULAR; INTRAVENOUS DAILY
Status: DISCONTINUED | OUTPATIENT
Start: 2019-05-19 | End: 2019-05-23

## 2019-05-19 RX ADMIN — SODIUM CHLORIDE, PRESERVATIVE FREE 3 ML: 5 INJECTION INTRAVENOUS at 11:30

## 2019-05-19 RX ADMIN — DOXYCYCLINE 100 MG: 100 CAPSULE ORAL at 04:08

## 2019-05-19 RX ADMIN — INSULIN DETEMIR 30 UNITS: 100 INJECTION, SOLUTION SUBCUTANEOUS at 12:33

## 2019-05-19 RX ADMIN — GABAPENTIN 1200 MG: 400 CAPSULE ORAL at 21:48

## 2019-05-19 RX ADMIN — DULOXETINE HYDROCHLORIDE 30 MG: 30 CAPSULE, DELAYED RELEASE ORAL at 11:25

## 2019-05-19 RX ADMIN — FUROSEMIDE 40 MG: 10 INJECTION, SOLUTION INTRAMUSCULAR; INTRAVENOUS at 11:25

## 2019-05-19 RX ADMIN — TERAZOSIN HYDROCHLORIDE ANHYDROUS 8 MG: 1 CAPSULE ORAL at 21:48

## 2019-05-19 RX ADMIN — NYSTATIN 1 APPLICATION: 100000 POWDER TOPICAL at 21:56

## 2019-05-19 RX ADMIN — LISINOPRIL 20 MG: 20 TABLET ORAL at 11:24

## 2019-05-19 RX ADMIN — GABAPENTIN 1200 MG: 400 CAPSULE ORAL at 11:23

## 2019-05-19 RX ADMIN — DOXYCYCLINE 100 MG: 100 CAPSULE ORAL at 21:48

## 2019-05-19 RX ADMIN — TAZOBACTAM SODIUM AND PIPERACILLIN SODIUM 3.38 G: 375; 3 INJECTION, SOLUTION INTRAVENOUS at 13:20

## 2019-05-19 RX ADMIN — NYSTATIN 1 APPLICATION: 100000 POWDER TOPICAL at 12:33

## 2019-05-19 RX ADMIN — METOPROLOL TARTRATE 75 MG: 50 TABLET ORAL at 21:48

## 2019-05-19 RX ADMIN — CLONIDINE HYDROCHLORIDE 0.1 MG: 0.1 TABLET ORAL at 11:24

## 2019-05-19 RX ADMIN — Medication 1 CAPSULE: at 11:24

## 2019-05-19 RX ADMIN — ENOXAPARIN SODIUM 40 MG: 40 INJECTION SUBCUTANEOUS at 06:12

## 2019-05-19 RX ADMIN — FUROSEMIDE 60 MG: 10 INJECTION, SOLUTION INTRAMUSCULAR; INTRAVENOUS at 02:21

## 2019-05-19 RX ADMIN — INSULIN DETEMIR 30 UNITS: 100 INJECTION, SOLUTION SUBCUTANEOUS at 21:52

## 2019-05-19 RX ADMIN — GABAPENTIN 1200 MG: 400 CAPSULE ORAL at 16:52

## 2019-05-19 RX ADMIN — METOPROLOL TARTRATE 75 MG: 50 TABLET ORAL at 11:24

## 2019-05-19 RX ADMIN — INSULIN LISPRO 2 UNITS: 100 INJECTION, SOLUTION INTRAVENOUS; SUBCUTANEOUS at 12:33

## 2019-05-19 RX ADMIN — TAZOBACTAM SODIUM AND PIPERACILLIN SODIUM 3.38 G: 375; 3 INJECTION, SOLUTION INTRAVENOUS at 06:11

## 2019-05-19 RX ADMIN — HYDROCODONE BITARTRATE AND ACETAMINOPHEN 1 TABLET: 10; 325 TABLET ORAL at 16:52

## 2019-05-19 RX ADMIN — TAZOBACTAM SODIUM AND PIPERACILLIN SODIUM 3.38 G: 375; 3 INJECTION, SOLUTION INTRAVENOUS at 21:51

## 2019-05-20 PROBLEM — J90 PLEURAL EFFUSION, BILATERAL: Status: ACTIVE | Noted: 2019-05-20

## 2019-05-20 PROBLEM — L03.311 ABDOMINAL WALL CELLULITIS: Status: ACTIVE | Noted: 2019-05-20

## 2019-05-20 PROBLEM — E66.9 OBESITY (BMI 30-39.9): Status: ACTIVE | Noted: 2019-05-20

## 2019-05-20 LAB
CK SERPL-CCNC: 66 U/L (ref 20–200)
GLUCOSE BLDC GLUCOMTR-MCNC: 101 MG/DL (ref 70–130)
GLUCOSE BLDC GLUCOMTR-MCNC: 185 MG/DL (ref 70–130)
GLUCOSE BLDC GLUCOMTR-MCNC: 93 MG/DL (ref 70–130)
GLUCOSE BLDC GLUCOMTR-MCNC: 95 MG/DL (ref 70–130)

## 2019-05-20 PROCEDURE — 97162 PT EVAL MOD COMPLEX 30 MIN: CPT | Performed by: PHYSICAL THERAPIST

## 2019-05-20 PROCEDURE — 99233 SBSQ HOSP IP/OBS HIGH 50: CPT | Performed by: INTERNAL MEDICINE

## 2019-05-20 PROCEDURE — 25010000002 PIPERACILLIN SOD-TAZOBACTAM PER 1 G: Performed by: INTERNAL MEDICINE

## 2019-05-20 PROCEDURE — 63710000001 INSULIN DETEMIR PER 5 UNITS: Performed by: INTERNAL MEDICINE

## 2019-05-20 PROCEDURE — 97166 OT EVAL MOD COMPLEX 45 MIN: CPT

## 2019-05-20 PROCEDURE — 82962 GLUCOSE BLOOD TEST: CPT

## 2019-05-20 PROCEDURE — 82550 ASSAY OF CK (CPK): CPT | Performed by: INTERNAL MEDICINE

## 2019-05-20 PROCEDURE — 25010000002 DAPTOMYCIN PER 1 MG: Performed by: INTERNAL MEDICINE

## 2019-05-20 PROCEDURE — 25010000002 ENOXAPARIN PER 10 MG: Performed by: INTERNAL MEDICINE

## 2019-05-20 PROCEDURE — 25010000002 FUROSEMIDE PER 20 MG: Performed by: INTERNAL MEDICINE

## 2019-05-20 RX ORDER — SPIRONOLACTONE 25 MG/1
25 TABLET ORAL DAILY
Status: DISCONTINUED | OUTPATIENT
Start: 2019-05-20 | End: 2019-05-28 | Stop reason: HOSPADM

## 2019-05-20 RX ADMIN — DAPTOMYCIN 450 MG: 500 INJECTION, POWDER, LYOPHILIZED, FOR SOLUTION INTRAVENOUS at 19:42

## 2019-05-20 RX ADMIN — TAZOBACTAM SODIUM AND PIPERACILLIN SODIUM 3.38 G: 375; 3 INJECTION, SOLUTION INTRAVENOUS at 21:16

## 2019-05-20 RX ADMIN — LISINOPRIL 20 MG: 20 TABLET ORAL at 08:37

## 2019-05-20 RX ADMIN — SPIRONOLACTONE 25 MG: 25 TABLET ORAL at 14:40

## 2019-05-20 RX ADMIN — TAZOBACTAM SODIUM AND PIPERACILLIN SODIUM 3.38 G: 375; 3 INJECTION, SOLUTION INTRAVENOUS at 14:40

## 2019-05-20 RX ADMIN — DULOXETINE HYDROCHLORIDE 30 MG: 30 CAPSULE, DELAYED RELEASE ORAL at 08:37

## 2019-05-20 RX ADMIN — HYDROCODONE BITARTRATE AND ACETAMINOPHEN 1 TABLET: 10; 325 TABLET ORAL at 19:49

## 2019-05-20 RX ADMIN — CLONIDINE HYDROCHLORIDE 0.1 MG: 0.1 TABLET ORAL at 08:37

## 2019-05-20 RX ADMIN — NYSTATIN 1 APPLICATION: 100000 POWDER TOPICAL at 08:41

## 2019-05-20 RX ADMIN — TAZOBACTAM SODIUM AND PIPERACILLIN SODIUM 3.38 G: 375; 3 INJECTION, SOLUTION INTRAVENOUS at 05:17

## 2019-05-20 RX ADMIN — NYSTATIN 1 APPLICATION: 100000 POWDER TOPICAL at 21:14

## 2019-05-20 RX ADMIN — METOPROLOL TARTRATE 75 MG: 50 TABLET ORAL at 08:37

## 2019-05-20 RX ADMIN — ENOXAPARIN SODIUM 40 MG: 40 INJECTION SUBCUTANEOUS at 05:17

## 2019-05-20 RX ADMIN — INSULIN DETEMIR 30 UNITS: 100 INJECTION, SOLUTION SUBCUTANEOUS at 08:37

## 2019-05-20 RX ADMIN — METOPROLOL TARTRATE 75 MG: 50 TABLET ORAL at 21:10

## 2019-05-20 RX ADMIN — GABAPENTIN 1200 MG: 400 CAPSULE ORAL at 08:37

## 2019-05-20 RX ADMIN — INSULIN DETEMIR 30 UNITS: 100 INJECTION, SOLUTION SUBCUTANEOUS at 21:16

## 2019-05-20 RX ADMIN — TERAZOSIN HYDROCHLORIDE ANHYDROUS 8 MG: 1 CAPSULE ORAL at 21:12

## 2019-05-20 RX ADMIN — GABAPENTIN 1200 MG: 400 CAPSULE ORAL at 21:09

## 2019-05-20 RX ADMIN — FUROSEMIDE 40 MG: 10 INJECTION, SOLUTION INTRAMUSCULAR; INTRAVENOUS at 08:37

## 2019-05-20 RX ADMIN — INSULIN LISPRO 2 UNITS: 100 INJECTION, SOLUTION INTRAVENOUS; SUBCUTANEOUS at 21:14

## 2019-05-20 RX ADMIN — GABAPENTIN 1200 MG: 400 CAPSULE ORAL at 16:38

## 2019-05-20 RX ADMIN — Medication 1 CAPSULE: at 11:57

## 2019-05-20 RX ADMIN — DOXYCYCLINE 100 MG: 100 CAPSULE ORAL at 08:37

## 2019-05-21 ENCOUNTER — APPOINTMENT (OUTPATIENT)
Dept: CT IMAGING | Facility: HOSPITAL | Age: 49
End: 2019-05-21

## 2019-05-21 LAB
ALBUMIN FLD-MCNC: 1.4 G/DL
ANION GAP SERPL CALCULATED.3IONS-SCNC: 8 MMOL/L
APPEARANCE FLD: ABNORMAL
BUN BLD-MCNC: 31 MG/DL (ref 6–20)
BUN/CREAT SERPL: 47.7 (ref 7–25)
CALCIUM SPEC-SCNC: 8.9 MG/DL (ref 8.6–10.5)
CHLORIDE SERPL-SCNC: 102 MMOL/L (ref 98–107)
CO2 SERPL-SCNC: 33 MMOL/L (ref 22–29)
COLOR FLD: YELLOW
CREAT BLD-MCNC: 0.65 MG/DL (ref 0.76–1.27)
GFR SERPL CREATININE-BSD FRML MDRD: 130 ML/MIN/1.73
GLUCOSE BLD-MCNC: 179 MG/DL (ref 65–99)
GLUCOSE BLDC GLUCOMTR-MCNC: 156 MG/DL (ref 70–130)
GLUCOSE BLDC GLUCOMTR-MCNC: 176 MG/DL (ref 70–130)
GLUCOSE BLDC GLUCOMTR-MCNC: 182 MG/DL (ref 70–130)
LYMPHOCYTES NFR FLD MANUAL: 54 %
MESOTHL CELL NFR FLD MANUAL: 43 %
MONOCYTES NFR FLD: 3 %
POTASSIUM BLD-SCNC: 4.1 MMOL/L (ref 3.5–5.2)
PROT FLD-MCNC: 2.3 G/DL
RBC # FLD AUTO: <1000 /MM3
SODIUM BLD-SCNC: 143 MMOL/L (ref 136–145)
WBC # FLD AUTO: 456 /MM3

## 2019-05-21 PROCEDURE — 82042 OTHER SOURCE ALBUMIN QUAN EA: CPT | Performed by: INTERNAL MEDICINE

## 2019-05-21 PROCEDURE — 87205 SMEAR GRAM STAIN: CPT | Performed by: INTERNAL MEDICINE

## 2019-05-21 PROCEDURE — 84157 ASSAY OF PROTEIN OTHER: CPT | Performed by: INTERNAL MEDICINE

## 2019-05-21 PROCEDURE — 87075 CULTR BACTERIA EXCEPT BLOOD: CPT | Performed by: INTERNAL MEDICINE

## 2019-05-21 PROCEDURE — 99233 SBSQ HOSP IP/OBS HIGH 50: CPT | Performed by: INTERNAL MEDICINE

## 2019-05-21 PROCEDURE — 97164 PT RE-EVAL EST PLAN CARE: CPT

## 2019-05-21 PROCEDURE — 25010000002 FUROSEMIDE PER 20 MG: Performed by: INTERNAL MEDICINE

## 2019-05-21 PROCEDURE — 97605 NEG PRS WND THER DME<=50SQCM: CPT

## 2019-05-21 PROCEDURE — 89051 BODY FLUID CELL COUNT: CPT | Performed by: INTERNAL MEDICINE

## 2019-05-21 PROCEDURE — 0W993ZZ DRAINAGE OF RIGHT PLEURAL CAVITY, PERCUTANEOUS APPROACH: ICD-10-PCS | Performed by: HOSPITALIST

## 2019-05-21 PROCEDURE — 82962 GLUCOSE BLOOD TEST: CPT

## 2019-05-21 PROCEDURE — 80048 BASIC METABOLIC PNL TOTAL CA: CPT | Performed by: INTERNAL MEDICINE

## 2019-05-21 PROCEDURE — 75989 ABSCESS DRAINAGE UNDER X-RAY: CPT

## 2019-05-21 PROCEDURE — 83615 LACTATE (LD) (LDH) ENZYME: CPT | Performed by: INTERNAL MEDICINE

## 2019-05-21 PROCEDURE — 25010000002 ENOXAPARIN PER 10 MG

## 2019-05-21 PROCEDURE — 25010000002 PIPERACILLIN SOD-TAZOBACTAM PER 1 G: Performed by: INTERNAL MEDICINE

## 2019-05-21 PROCEDURE — 63710000001 INSULIN DETEMIR PER 5 UNITS: Performed by: INTERNAL MEDICINE

## 2019-05-21 PROCEDURE — 87015 SPECIMEN INFECT AGNT CONCNTJ: CPT | Performed by: INTERNAL MEDICINE

## 2019-05-21 PROCEDURE — 87070 CULTURE OTHR SPECIMN AEROBIC: CPT | Performed by: INTERNAL MEDICINE

## 2019-05-21 PROCEDURE — 25010000002 DAPTOMYCIN PER 1 MG: Performed by: INTERNAL MEDICINE

## 2019-05-21 PROCEDURE — 97597 DBRDMT OPN WND 1ST 20 CM/<: CPT

## 2019-05-21 PROCEDURE — C1729 CATH, DRAINAGE: HCPCS

## 2019-05-21 RX ORDER — LIDOCAINE HYDROCHLORIDE 10 MG/ML
20 INJECTION, SOLUTION INFILTRATION; PERINEURAL ONCE
Status: COMPLETED | OUTPATIENT
Start: 2019-05-21 | End: 2019-05-21

## 2019-05-21 RX ORDER — LIDOCAINE HYDROCHLORIDE 10 MG/ML
20 INJECTION, SOLUTION INFILTRATION; PERINEURAL ONCE
Status: DISCONTINUED | OUTPATIENT
Start: 2019-05-21 | End: 2019-05-21

## 2019-05-21 RX ADMIN — GABAPENTIN 1200 MG: 400 CAPSULE ORAL at 21:17

## 2019-05-21 RX ADMIN — METOPROLOL TARTRATE 75 MG: 50 TABLET ORAL at 08:44

## 2019-05-21 RX ADMIN — Medication 1 CAPSULE: at 13:10

## 2019-05-21 RX ADMIN — NYSTATIN 1 APPLICATION: 100000 POWDER TOPICAL at 22:25

## 2019-05-21 RX ADMIN — TAZOBACTAM SODIUM AND PIPERACILLIN SODIUM 3.38 G: 375; 3 INJECTION, SOLUTION INTRAVENOUS at 05:04

## 2019-05-21 RX ADMIN — TAZOBACTAM SODIUM AND PIPERACILLIN SODIUM 3.38 G: 375; 3 INJECTION, SOLUTION INTRAVENOUS at 22:26

## 2019-05-21 RX ADMIN — TERAZOSIN HYDROCHLORIDE ANHYDROUS 8 MG: 1 CAPSULE ORAL at 21:23

## 2019-05-21 RX ADMIN — DULOXETINE HYDROCHLORIDE 30 MG: 30 CAPSULE, DELAYED RELEASE ORAL at 08:44

## 2019-05-21 RX ADMIN — SPIRONOLACTONE 25 MG: 25 TABLET ORAL at 08:45

## 2019-05-21 RX ADMIN — SODIUM CHLORIDE, PRESERVATIVE FREE 3 ML: 5 INJECTION INTRAVENOUS at 21:18

## 2019-05-21 RX ADMIN — GABAPENTIN 1200 MG: 400 CAPSULE ORAL at 15:35

## 2019-05-21 RX ADMIN — TAZOBACTAM SODIUM AND PIPERACILLIN SODIUM 3.38 G: 375; 3 INJECTION, SOLUTION INTRAVENOUS at 15:36

## 2019-05-21 RX ADMIN — LISINOPRIL 20 MG: 20 TABLET ORAL at 08:44

## 2019-05-21 RX ADMIN — LIDOCAINE HYDROCHLORIDE 20 ML: 10 INJECTION, SOLUTION INFILTRATION; PERINEURAL at 10:15

## 2019-05-21 RX ADMIN — METOPROLOL TARTRATE 75 MG: 50 TABLET ORAL at 21:17

## 2019-05-21 RX ADMIN — DAPTOMYCIN 450 MG: 500 INJECTION, POWDER, LYOPHILIZED, FOR SOLUTION INTRAVENOUS at 13:10

## 2019-05-21 RX ADMIN — INSULIN DETEMIR 30 UNITS: 100 INJECTION, SOLUTION SUBCUTANEOUS at 09:00

## 2019-05-21 RX ADMIN — CLONIDINE HYDROCHLORIDE 0.1 MG: 0.1 TABLET ORAL at 08:44

## 2019-05-21 RX ADMIN — INSULIN DETEMIR 30 UNITS: 100 INJECTION, SOLUTION SUBCUTANEOUS at 21:24

## 2019-05-21 RX ADMIN — ENOXAPARIN SODIUM 40 MG: 40 INJECTION SUBCUTANEOUS at 21:17

## 2019-05-21 RX ADMIN — INSULIN LISPRO 2 UNITS: 100 INJECTION, SOLUTION INTRAVENOUS; SUBCUTANEOUS at 21:18

## 2019-05-21 RX ADMIN — FUROSEMIDE 40 MG: 10 INJECTION, SOLUTION INTRAMUSCULAR; INTRAVENOUS at 08:45

## 2019-05-21 RX ADMIN — HYDROCODONE BITARTRATE AND ACETAMINOPHEN 1 TABLET: 10; 325 TABLET ORAL at 15:36

## 2019-05-21 RX ADMIN — GABAPENTIN 1200 MG: 400 CAPSULE ORAL at 08:44

## 2019-05-21 RX ADMIN — INSULIN LISPRO 2 UNITS: 100 INJECTION, SOLUTION INTRAVENOUS; SUBCUTANEOUS at 08:44

## 2019-05-22 LAB
ANION GAP SERPL CALCULATED.3IONS-SCNC: 10 MMOL/L
BUN BLD-MCNC: 26 MG/DL (ref 6–20)
BUN/CREAT SERPL: 39.4 (ref 7–25)
CALCIUM SPEC-SCNC: 8.9 MG/DL (ref 8.6–10.5)
CHLORIDE SERPL-SCNC: 101 MMOL/L (ref 98–107)
CO2 SERPL-SCNC: 32 MMOL/L (ref 22–29)
CREAT BLD-MCNC: 0.66 MG/DL (ref 0.76–1.27)
DEPRECATED RDW RBC AUTO: 47.3 FL (ref 37–54)
ERYTHROCYTE [DISTWIDTH] IN BLOOD BY AUTOMATED COUNT: 14.6 % (ref 12.3–15.4)
GFR SERPL CREATININE-BSD FRML MDRD: 128 ML/MIN/1.73
GLUCOSE BLD-MCNC: 103 MG/DL (ref 65–99)
GLUCOSE BLDC GLUCOMTR-MCNC: 111 MG/DL (ref 70–130)
GLUCOSE BLDC GLUCOMTR-MCNC: 140 MG/DL (ref 70–130)
GLUCOSE BLDC GLUCOMTR-MCNC: 143 MG/DL (ref 70–130)
GLUCOSE BLDC GLUCOMTR-MCNC: 184 MG/DL (ref 70–130)
HCT VFR BLD AUTO: 33.1 % (ref 37.5–51)
HGB BLD-MCNC: 10.5 G/DL (ref 13–17.7)
MCH RBC QN AUTO: 28.2 PG (ref 26.6–33)
MCHC RBC AUTO-ENTMCNC: 31.7 G/DL (ref 31.5–35.7)
MCV RBC AUTO: 88.7 FL (ref 79–97)
PLATELET # BLD AUTO: 144 10*3/MM3 (ref 140–450)
PMV BLD AUTO: 11.6 FL (ref 6–12)
POTASSIUM BLD-SCNC: 4 MMOL/L (ref 3.5–5.2)
RBC # BLD AUTO: 3.73 10*6/MM3 (ref 4.14–5.8)
SODIUM BLD-SCNC: 143 MMOL/L (ref 136–145)
WBC NRBC COR # BLD: 7.3 10*3/MM3 (ref 3.4–10.8)

## 2019-05-22 PROCEDURE — 25010000002 FUROSEMIDE PER 20 MG: Performed by: INTERNAL MEDICINE

## 2019-05-22 PROCEDURE — 85027 COMPLETE CBC AUTOMATED: CPT | Performed by: INTERNAL MEDICINE

## 2019-05-22 PROCEDURE — 25010000002 DAPTOMYCIN PER 1 MG: Performed by: INTERNAL MEDICINE

## 2019-05-22 PROCEDURE — 25010000002 PIPERACILLIN SOD-TAZOBACTAM PER 1 G: Performed by: INTERNAL MEDICINE

## 2019-05-22 PROCEDURE — 80048 BASIC METABOLIC PNL TOTAL CA: CPT | Performed by: INTERNAL MEDICINE

## 2019-05-22 PROCEDURE — 99233 SBSQ HOSP IP/OBS HIGH 50: CPT | Performed by: INTERNAL MEDICINE

## 2019-05-22 PROCEDURE — 25010000002 ENOXAPARIN PER 10 MG

## 2019-05-22 PROCEDURE — 97605 NEG PRS WND THER DME<=50SQCM: CPT

## 2019-05-22 PROCEDURE — 63710000001 INSULIN DETEMIR PER 5 UNITS: Performed by: INTERNAL MEDICINE

## 2019-05-22 PROCEDURE — 82962 GLUCOSE BLOOD TEST: CPT

## 2019-05-22 RX ADMIN — METOPROLOL TARTRATE 75 MG: 50 TABLET ORAL at 08:35

## 2019-05-22 RX ADMIN — INSULIN DETEMIR 30 UNITS: 100 INJECTION, SOLUTION SUBCUTANEOUS at 22:10

## 2019-05-22 RX ADMIN — DULOXETINE HYDROCHLORIDE 30 MG: 30 CAPSULE, DELAYED RELEASE ORAL at 08:35

## 2019-05-22 RX ADMIN — DAPTOMYCIN 450 MG: 500 INJECTION, POWDER, LYOPHILIZED, FOR SOLUTION INTRAVENOUS at 08:36

## 2019-05-22 RX ADMIN — TAZOBACTAM SODIUM AND PIPERACILLIN SODIUM 3.38 G: 375; 3 INJECTION, SOLUTION INTRAVENOUS at 13:12

## 2019-05-22 RX ADMIN — CLONIDINE HYDROCHLORIDE 0.1 MG: 0.1 TABLET ORAL at 08:36

## 2019-05-22 RX ADMIN — NYSTATIN 1 APPLICATION: 100000 POWDER TOPICAL at 22:10

## 2019-05-22 RX ADMIN — SPIRONOLACTONE 25 MG: 25 TABLET ORAL at 08:35

## 2019-05-22 RX ADMIN — NYSTATIN 1 APPLICATION: 100000 POWDER TOPICAL at 08:36

## 2019-05-22 RX ADMIN — METOPROLOL TARTRATE 75 MG: 50 TABLET ORAL at 22:09

## 2019-05-22 RX ADMIN — INSULIN DETEMIR 30 UNITS: 100 INJECTION, SOLUTION SUBCUTANEOUS at 08:40

## 2019-05-22 RX ADMIN — ENOXAPARIN SODIUM 40 MG: 40 INJECTION SUBCUTANEOUS at 08:36

## 2019-05-22 RX ADMIN — INSULIN LISPRO 2 UNITS: 100 INJECTION, SOLUTION INTRAVENOUS; SUBCUTANEOUS at 08:37

## 2019-05-22 RX ADMIN — TAZOBACTAM SODIUM AND PIPERACILLIN SODIUM 3.38 G: 375; 3 INJECTION, SOLUTION INTRAVENOUS at 22:08

## 2019-05-22 RX ADMIN — Medication 1 CAPSULE: at 13:12

## 2019-05-22 RX ADMIN — TAZOBACTAM SODIUM AND PIPERACILLIN SODIUM 3.38 G: 375; 3 INJECTION, SOLUTION INTRAVENOUS at 05:44

## 2019-05-22 RX ADMIN — GABAPENTIN 1200 MG: 400 CAPSULE ORAL at 17:16

## 2019-05-22 RX ADMIN — SODIUM CHLORIDE, PRESERVATIVE FREE 3 ML: 5 INJECTION INTRAVENOUS at 22:10

## 2019-05-22 RX ADMIN — GABAPENTIN 1200 MG: 400 CAPSULE ORAL at 08:35

## 2019-05-22 RX ADMIN — ENOXAPARIN SODIUM 40 MG: 40 INJECTION SUBCUTANEOUS at 22:11

## 2019-05-22 RX ADMIN — LISINOPRIL 20 MG: 20 TABLET ORAL at 08:36

## 2019-05-22 RX ADMIN — GABAPENTIN 1200 MG: 400 CAPSULE ORAL at 22:09

## 2019-05-22 RX ADMIN — FUROSEMIDE 40 MG: 10 INJECTION, SOLUTION INTRAMUSCULAR; INTRAVENOUS at 08:36

## 2019-05-22 RX ADMIN — TERAZOSIN HYDROCHLORIDE ANHYDROUS 8 MG: 1 CAPSULE ORAL at 22:09

## 2019-05-23 ENCOUNTER — APPOINTMENT (OUTPATIENT)
Dept: GENERAL RADIOLOGY | Facility: HOSPITAL | Age: 49
End: 2019-05-23

## 2019-05-23 LAB
ANION GAP SERPL CALCULATED.3IONS-SCNC: 8 MMOL/L
BUN BLD-MCNC: 27 MG/DL (ref 6–20)
BUN/CREAT SERPL: 36 (ref 7–25)
CALCIUM SPEC-SCNC: 8.5 MG/DL (ref 8.6–10.5)
CHLORIDE SERPL-SCNC: 101 MMOL/L (ref 98–107)
CO2 SERPL-SCNC: 33 MMOL/L (ref 22–29)
CREAT BLD-MCNC: 0.75 MG/DL (ref 0.76–1.27)
DEPRECATED RDW RBC AUTO: 47.9 FL (ref 37–54)
ERYTHROCYTE [DISTWIDTH] IN BLOOD BY AUTOMATED COUNT: 14.7 % (ref 12.3–15.4)
GFR SERPL CREATININE-BSD FRML MDRD: 110 ML/MIN/1.73
GLUCOSE BLD-MCNC: 176 MG/DL (ref 65–99)
GLUCOSE BLDC GLUCOMTR-MCNC: 116 MG/DL (ref 70–130)
GLUCOSE BLDC GLUCOMTR-MCNC: 131 MG/DL (ref 70–130)
GLUCOSE BLDC GLUCOMTR-MCNC: 157 MG/DL (ref 70–130)
GLUCOSE BLDC GLUCOMTR-MCNC: 164 MG/DL (ref 70–130)
HCT VFR BLD AUTO: 31.9 % (ref 37.5–51)
HGB BLD-MCNC: 10 G/DL (ref 13–17.7)
LDH FLD-CCNC: 95 U/L
LDH SERPL-CCNC: 191 U/L (ref 135–225)
MCH RBC QN AUTO: 28.1 PG (ref 26.6–33)
MCHC RBC AUTO-ENTMCNC: 31.3 G/DL (ref 31.5–35.7)
MCV RBC AUTO: 89.6 FL (ref 79–97)
PLATELET # BLD AUTO: 141 10*3/MM3 (ref 140–450)
PMV BLD AUTO: 12.1 FL (ref 6–12)
POTASSIUM BLD-SCNC: 4.1 MMOL/L (ref 3.5–5.2)
RBC # BLD AUTO: 3.56 10*6/MM3 (ref 4.14–5.8)
SODIUM BLD-SCNC: 142 MMOL/L (ref 136–145)
WBC NRBC COR # BLD: 8.34 10*3/MM3 (ref 3.4–10.8)

## 2019-05-23 PROCEDURE — 97110 THERAPEUTIC EXERCISES: CPT

## 2019-05-23 PROCEDURE — 83615 LACTATE (LD) (LDH) ENZYME: CPT | Performed by: INTERNAL MEDICINE

## 2019-05-23 PROCEDURE — 85027 COMPLETE CBC AUTOMATED: CPT | Performed by: INTERNAL MEDICINE

## 2019-05-23 PROCEDURE — 82962 GLUCOSE BLOOD TEST: CPT

## 2019-05-23 PROCEDURE — 63710000001 INSULIN DETEMIR PER 5 UNITS: Performed by: INTERNAL MEDICINE

## 2019-05-23 PROCEDURE — 97605 NEG PRS WND THER DME<=50SQCM: CPT

## 2019-05-23 PROCEDURE — 25010000002 DAPTOMYCIN PER 1 MG: Performed by: INTERNAL MEDICINE

## 2019-05-23 PROCEDURE — 97535 SELF CARE MNGMENT TRAINING: CPT

## 2019-05-23 PROCEDURE — 71045 X-RAY EXAM CHEST 1 VIEW: CPT

## 2019-05-23 PROCEDURE — 99233 SBSQ HOSP IP/OBS HIGH 50: CPT | Performed by: INTERNAL MEDICINE

## 2019-05-23 PROCEDURE — 80048 BASIC METABOLIC PNL TOTAL CA: CPT | Performed by: INTERNAL MEDICINE

## 2019-05-23 PROCEDURE — 25010000002 ENOXAPARIN PER 10 MG

## 2019-05-23 PROCEDURE — 97530 THERAPEUTIC ACTIVITIES: CPT

## 2019-05-23 PROCEDURE — 25010000002 PIPERACILLIN SOD-TAZOBACTAM PER 1 G: Performed by: INTERNAL MEDICINE

## 2019-05-23 RX ORDER — BUSPIRONE HYDROCHLORIDE 5 MG/1
5 TABLET ORAL 3 TIMES DAILY
Status: DISCONTINUED | OUTPATIENT
Start: 2019-05-23 | End: 2019-05-28 | Stop reason: HOSPADM

## 2019-05-23 RX ORDER — FUROSEMIDE 40 MG/1
80 TABLET ORAL DAILY
Status: DISCONTINUED | OUTPATIENT
Start: 2019-05-23 | End: 2019-05-28 | Stop reason: HOSPADM

## 2019-05-23 RX ADMIN — DULOXETINE HYDROCHLORIDE 30 MG: 30 CAPSULE, DELAYED RELEASE ORAL at 09:02

## 2019-05-23 RX ADMIN — INSULIN DETEMIR 30 UNITS: 100 INJECTION, SOLUTION SUBCUTANEOUS at 09:02

## 2019-05-23 RX ADMIN — DAPTOMYCIN 450 MG: 500 INJECTION, POWDER, LYOPHILIZED, FOR SOLUTION INTRAVENOUS at 09:02

## 2019-05-23 RX ADMIN — ENOXAPARIN SODIUM 40 MG: 40 INJECTION SUBCUTANEOUS at 20:42

## 2019-05-23 RX ADMIN — INSULIN LISPRO 2 UNITS: 100 INJECTION, SOLUTION INTRAVENOUS; SUBCUTANEOUS at 18:19

## 2019-05-23 RX ADMIN — TAZOBACTAM SODIUM AND PIPERACILLIN SODIUM 3.38 G: 375; 3 INJECTION, SOLUTION INTRAVENOUS at 20:43

## 2019-05-23 RX ADMIN — Medication 1 CAPSULE: at 12:32

## 2019-05-23 RX ADMIN — LISINOPRIL 20 MG: 20 TABLET ORAL at 09:02

## 2019-05-23 RX ADMIN — BUSPIRONE HYDROCHLORIDE 5 MG: 5 TABLET ORAL at 18:20

## 2019-05-23 RX ADMIN — NYSTATIN 1 APPLICATION: 100000 POWDER TOPICAL at 20:42

## 2019-05-23 RX ADMIN — TAZOBACTAM SODIUM AND PIPERACILLIN SODIUM 3.38 G: 375; 3 INJECTION, SOLUTION INTRAVENOUS at 14:22

## 2019-05-23 RX ADMIN — FUROSEMIDE 80 MG: 40 TABLET ORAL at 09:02

## 2019-05-23 RX ADMIN — METOPROLOL TARTRATE 75 MG: 50 TABLET ORAL at 09:02

## 2019-05-23 RX ADMIN — TERAZOSIN HYDROCHLORIDE ANHYDROUS 8 MG: 1 CAPSULE ORAL at 20:42

## 2019-05-23 RX ADMIN — INSULIN LISPRO 2 UNITS: 100 INJECTION, SOLUTION INTRAVENOUS; SUBCUTANEOUS at 09:01

## 2019-05-23 RX ADMIN — GABAPENTIN 1200 MG: 400 CAPSULE ORAL at 09:02

## 2019-05-23 RX ADMIN — SPIRONOLACTONE 25 MG: 25 TABLET ORAL at 09:02

## 2019-05-23 RX ADMIN — METOPROLOL TARTRATE 75 MG: 50 TABLET ORAL at 20:42

## 2019-05-23 RX ADMIN — NYSTATIN 1 APPLICATION: 100000 POWDER TOPICAL at 09:02

## 2019-05-23 RX ADMIN — BUSPIRONE HYDROCHLORIDE 5 MG: 5 TABLET ORAL at 12:32

## 2019-05-23 RX ADMIN — CLONIDINE HYDROCHLORIDE 0.1 MG: 0.1 TABLET ORAL at 09:02

## 2019-05-23 RX ADMIN — BUSPIRONE HYDROCHLORIDE 5 MG: 5 TABLET ORAL at 20:42

## 2019-05-23 RX ADMIN — HYDROCODONE BITARTRATE AND ACETAMINOPHEN 1 TABLET: 10; 325 TABLET ORAL at 02:22

## 2019-05-23 RX ADMIN — TAZOBACTAM SODIUM AND PIPERACILLIN SODIUM 3.38 G: 375; 3 INJECTION, SOLUTION INTRAVENOUS at 05:38

## 2019-05-23 RX ADMIN — INSULIN DETEMIR 30 UNITS: 100 INJECTION, SOLUTION SUBCUTANEOUS at 22:03

## 2019-05-23 RX ADMIN — GABAPENTIN 1200 MG: 400 CAPSULE ORAL at 20:42

## 2019-05-23 RX ADMIN — GABAPENTIN 1200 MG: 400 CAPSULE ORAL at 18:20

## 2019-05-24 LAB
ANION GAP SERPL CALCULATED.3IONS-SCNC: 9 MMOL/L
BACTERIA FLD CULT: NORMAL
BUN BLD-MCNC: 23 MG/DL (ref 6–20)
BUN/CREAT SERPL: 37.7 (ref 7–25)
CALCIUM SPEC-SCNC: 8.7 MG/DL (ref 8.6–10.5)
CHLORIDE SERPL-SCNC: 99 MMOL/L (ref 98–107)
CO2 SERPL-SCNC: 32 MMOL/L (ref 22–29)
CREAT BLD-MCNC: 0.61 MG/DL (ref 0.76–1.27)
DEPRECATED RDW RBC AUTO: 47.5 FL (ref 37–54)
ERYTHROCYTE [DISTWIDTH] IN BLOOD BY AUTOMATED COUNT: 14.6 % (ref 12.3–15.4)
GFR SERPL CREATININE-BSD FRML MDRD: 140 ML/MIN/1.73
GLUCOSE BLD-MCNC: 116 MG/DL (ref 65–99)
GLUCOSE BLDC GLUCOMTR-MCNC: 157 MG/DL (ref 70–130)
GLUCOSE BLDC GLUCOMTR-MCNC: 174 MG/DL (ref 70–130)
GLUCOSE BLDC GLUCOMTR-MCNC: 203 MG/DL (ref 70–130)
GLUCOSE BLDC GLUCOMTR-MCNC: 229 MG/DL (ref 70–130)
GRAM STN SPEC: NORMAL
GRAM STN SPEC: NORMAL
HCT VFR BLD AUTO: 32 % (ref 37.5–51)
HGB BLD-MCNC: 10.1 G/DL (ref 13–17.7)
MCH RBC QN AUTO: 28.1 PG (ref 26.6–33)
MCHC RBC AUTO-ENTMCNC: 31.6 G/DL (ref 31.5–35.7)
MCV RBC AUTO: 88.9 FL (ref 79–97)
PLATELET # BLD AUTO: 134 10*3/MM3 (ref 140–450)
PMV BLD AUTO: 11.7 FL (ref 6–12)
POTASSIUM BLD-SCNC: 3.9 MMOL/L (ref 3.5–5.2)
RBC # BLD AUTO: 3.6 10*6/MM3 (ref 4.14–5.8)
SODIUM BLD-SCNC: 140 MMOL/L (ref 136–145)
WBC NRBC COR # BLD: 8.13 10*3/MM3 (ref 3.4–10.8)

## 2019-05-24 PROCEDURE — 80048 BASIC METABOLIC PNL TOTAL CA: CPT | Performed by: INTERNAL MEDICINE

## 2019-05-24 PROCEDURE — 85027 COMPLETE CBC AUTOMATED: CPT | Performed by: INTERNAL MEDICINE

## 2019-05-24 PROCEDURE — 25010000002 DAPTOMYCIN PER 1 MG: Performed by: INTERNAL MEDICINE

## 2019-05-24 PROCEDURE — 25010000002 PIPERACILLIN SOD-TAZOBACTAM PER 1 G: Performed by: INTERNAL MEDICINE

## 2019-05-24 PROCEDURE — 63710000001 INSULIN DETEMIR PER 5 UNITS: Performed by: INTERNAL MEDICINE

## 2019-05-24 PROCEDURE — 82962 GLUCOSE BLOOD TEST: CPT

## 2019-05-24 PROCEDURE — 97597 DBRDMT OPN WND 1ST 20 CM/<: CPT

## 2019-05-24 PROCEDURE — 97605 NEG PRS WND THER DME<=50SQCM: CPT

## 2019-05-24 PROCEDURE — 25010000002 ENOXAPARIN PER 10 MG

## 2019-05-24 PROCEDURE — 99233 SBSQ HOSP IP/OBS HIGH 50: CPT | Performed by: INTERNAL MEDICINE

## 2019-05-24 RX ADMIN — NYSTATIN 1 APPLICATION: 100000 POWDER TOPICAL at 20:00

## 2019-05-24 RX ADMIN — INSULIN LISPRO 4 UNITS: 100 INJECTION, SOLUTION INTRAVENOUS; SUBCUTANEOUS at 21:02

## 2019-05-24 RX ADMIN — LISINOPRIL 20 MG: 20 TABLET ORAL at 08:26

## 2019-05-24 RX ADMIN — NYSTATIN 1 APPLICATION: 100000 POWDER TOPICAL at 08:26

## 2019-05-24 RX ADMIN — TERAZOSIN HYDROCHLORIDE ANHYDROUS 8 MG: 1 CAPSULE ORAL at 19:56

## 2019-05-24 RX ADMIN — BUSPIRONE HYDROCHLORIDE 5 MG: 5 TABLET ORAL at 16:16

## 2019-05-24 RX ADMIN — METOPROLOL TARTRATE 75 MG: 50 TABLET ORAL at 19:56

## 2019-05-24 RX ADMIN — DAPTOMYCIN 450 MG: 500 INJECTION, POWDER, LYOPHILIZED, FOR SOLUTION INTRAVENOUS at 08:26

## 2019-05-24 RX ADMIN — TAZOBACTAM SODIUM AND PIPERACILLIN SODIUM 3.38 G: 375; 3 INJECTION, SOLUTION INTRAVENOUS at 05:13

## 2019-05-24 RX ADMIN — DULOXETINE HYDROCHLORIDE 30 MG: 30 CAPSULE, DELAYED RELEASE ORAL at 08:26

## 2019-05-24 RX ADMIN — FUROSEMIDE 80 MG: 40 TABLET ORAL at 08:26

## 2019-05-24 RX ADMIN — HYDROCODONE BITARTRATE AND ACETAMINOPHEN 1 TABLET: 10; 325 TABLET ORAL at 14:48

## 2019-05-24 RX ADMIN — TAZOBACTAM SODIUM AND PIPERACILLIN SODIUM 3.38 G: 375; 3 INJECTION, SOLUTION INTRAVENOUS at 21:02

## 2019-05-24 RX ADMIN — GABAPENTIN 1200 MG: 400 CAPSULE ORAL at 08:26

## 2019-05-24 RX ADMIN — TAZOBACTAM SODIUM AND PIPERACILLIN SODIUM 3.38 G: 375; 3 INJECTION, SOLUTION INTRAVENOUS at 14:46

## 2019-05-24 RX ADMIN — CLONIDINE HYDROCHLORIDE 0.1 MG: 0.1 TABLET ORAL at 08:26

## 2019-05-24 RX ADMIN — INSULIN LISPRO 4 UNITS: 100 INJECTION, SOLUTION INTRAVENOUS; SUBCUTANEOUS at 08:25

## 2019-05-24 RX ADMIN — ENOXAPARIN SODIUM 40 MG: 40 INJECTION SUBCUTANEOUS at 19:57

## 2019-05-24 RX ADMIN — INSULIN LISPRO 2 UNITS: 100 INJECTION, SOLUTION INTRAVENOUS; SUBCUTANEOUS at 18:17

## 2019-05-24 RX ADMIN — BUSPIRONE HYDROCHLORIDE 5 MG: 5 TABLET ORAL at 19:57

## 2019-05-24 RX ADMIN — INSULIN DETEMIR 30 UNITS: 100 INJECTION, SOLUTION SUBCUTANEOUS at 21:03

## 2019-05-24 RX ADMIN — INSULIN LISPRO 2 UNITS: 100 INJECTION, SOLUTION INTRAVENOUS; SUBCUTANEOUS at 12:23

## 2019-05-24 RX ADMIN — BUSPIRONE HYDROCHLORIDE 5 MG: 5 TABLET ORAL at 08:26

## 2019-05-24 RX ADMIN — METOPROLOL TARTRATE 75 MG: 50 TABLET ORAL at 08:26

## 2019-05-24 RX ADMIN — GABAPENTIN 1200 MG: 400 CAPSULE ORAL at 16:16

## 2019-05-24 RX ADMIN — Medication 1 CAPSULE: at 12:24

## 2019-05-24 RX ADMIN — GABAPENTIN 1200 MG: 400 CAPSULE ORAL at 19:57

## 2019-05-24 RX ADMIN — INSULIN DETEMIR 30 UNITS: 100 INJECTION, SOLUTION SUBCUTANEOUS at 08:26

## 2019-05-24 RX ADMIN — SPIRONOLACTONE 25 MG: 25 TABLET ORAL at 08:26

## 2019-05-25 LAB
ANION GAP SERPL CALCULATED.3IONS-SCNC: 10 MMOL/L
BUN BLD-MCNC: 19 MG/DL (ref 6–20)
BUN/CREAT SERPL: 28.8 (ref 7–25)
CALCIUM SPEC-SCNC: 8.5 MG/DL (ref 8.6–10.5)
CHLORIDE SERPL-SCNC: 98 MMOL/L (ref 98–107)
CO2 SERPL-SCNC: 32 MMOL/L (ref 22–29)
CREAT BLD-MCNC: 0.66 MG/DL (ref 0.76–1.27)
DEPRECATED RDW RBC AUTO: 47.2 FL (ref 37–54)
ERYTHROCYTE [DISTWIDTH] IN BLOOD BY AUTOMATED COUNT: 14.7 % (ref 12.3–15.4)
GFR SERPL CREATININE-BSD FRML MDRD: 128 ML/MIN/1.73
GLUCOSE BLD-MCNC: 180 MG/DL (ref 65–99)
GLUCOSE BLDC GLUCOMTR-MCNC: 168 MG/DL (ref 70–130)
GLUCOSE BLDC GLUCOMTR-MCNC: 192 MG/DL (ref 70–130)
GLUCOSE BLDC GLUCOMTR-MCNC: 208 MG/DL (ref 70–130)
GLUCOSE BLDC GLUCOMTR-MCNC: 230 MG/DL (ref 70–130)
HCT VFR BLD AUTO: 30.9 % (ref 37.5–51)
HGB BLD-MCNC: 9.8 G/DL (ref 13–17.7)
MCH RBC QN AUTO: 27.9 PG (ref 26.6–33)
MCHC RBC AUTO-ENTMCNC: 31.7 G/DL (ref 31.5–35.7)
MCV RBC AUTO: 88 FL (ref 79–97)
PLATELET # BLD AUTO: 135 10*3/MM3 (ref 140–450)
PMV BLD AUTO: 11.5 FL (ref 6–12)
POTASSIUM BLD-SCNC: 4 MMOL/L (ref 3.5–5.2)
RBC # BLD AUTO: 3.51 10*6/MM3 (ref 4.14–5.8)
SODIUM BLD-SCNC: 140 MMOL/L (ref 136–145)
WBC NRBC COR # BLD: 8.59 10*3/MM3 (ref 3.4–10.8)

## 2019-05-25 PROCEDURE — 82962 GLUCOSE BLOOD TEST: CPT

## 2019-05-25 PROCEDURE — 25010000002 ENOXAPARIN PER 10 MG

## 2019-05-25 PROCEDURE — 99232 SBSQ HOSP IP/OBS MODERATE 35: CPT | Performed by: HOSPITALIST

## 2019-05-25 PROCEDURE — 85027 COMPLETE CBC AUTOMATED: CPT | Performed by: INTERNAL MEDICINE

## 2019-05-25 PROCEDURE — 25010000002 DAPTOMYCIN PER 1 MG: Performed by: INTERNAL MEDICINE

## 2019-05-25 PROCEDURE — 63710000001 INSULIN DETEMIR PER 5 UNITS: Performed by: INTERNAL MEDICINE

## 2019-05-25 PROCEDURE — 97605 NEG PRS WND THER DME<=50SQCM: CPT | Performed by: PHYSICAL THERAPIST

## 2019-05-25 PROCEDURE — 80048 BASIC METABOLIC PNL TOTAL CA: CPT | Performed by: INTERNAL MEDICINE

## 2019-05-25 PROCEDURE — 25010000002 PIPERACILLIN SOD-TAZOBACTAM PER 1 G: Performed by: INTERNAL MEDICINE

## 2019-05-25 RX ORDER — AMLODIPINE BESYLATE 10 MG/1
10 TABLET ORAL
Status: DISCONTINUED | OUTPATIENT
Start: 2019-05-25 | End: 2019-05-28 | Stop reason: HOSPADM

## 2019-05-25 RX ADMIN — BUSPIRONE HYDROCHLORIDE 5 MG: 5 TABLET ORAL at 20:43

## 2019-05-25 RX ADMIN — INSULIN DETEMIR 30 UNITS: 100 INJECTION, SOLUTION SUBCUTANEOUS at 10:11

## 2019-05-25 RX ADMIN — GABAPENTIN 1200 MG: 400 CAPSULE ORAL at 20:42

## 2019-05-25 RX ADMIN — INSULIN DETEMIR 30 UNITS: 100 INJECTION, SOLUTION SUBCUTANEOUS at 20:48

## 2019-05-25 RX ADMIN — DAPTOMYCIN 450 MG: 500 INJECTION, POWDER, LYOPHILIZED, FOR SOLUTION INTRAVENOUS at 10:10

## 2019-05-25 RX ADMIN — INSULIN LISPRO 4 UNITS: 100 INJECTION, SOLUTION INTRAVENOUS; SUBCUTANEOUS at 10:10

## 2019-05-25 RX ADMIN — NYSTATIN 1 APPLICATION: 100000 POWDER TOPICAL at 20:43

## 2019-05-25 RX ADMIN — METOPROLOL TARTRATE 75 MG: 50 TABLET ORAL at 20:48

## 2019-05-25 RX ADMIN — CLONIDINE HYDROCHLORIDE 0.1 MG: 0.1 TABLET ORAL at 10:09

## 2019-05-25 RX ADMIN — HYDROCODONE BITARTRATE AND ACETAMINOPHEN 1 TABLET: 10; 325 TABLET ORAL at 22:43

## 2019-05-25 RX ADMIN — NYSTATIN 1 APPLICATION: 100000 POWDER TOPICAL at 10:09

## 2019-05-25 RX ADMIN — INSULIN LISPRO 4 UNITS: 100 INJECTION, SOLUTION INTRAVENOUS; SUBCUTANEOUS at 20:43

## 2019-05-25 RX ADMIN — GABAPENTIN 1200 MG: 400 CAPSULE ORAL at 10:09

## 2019-05-25 RX ADMIN — INSULIN LISPRO 2 UNITS: 100 INJECTION, SOLUTION INTRAVENOUS; SUBCUTANEOUS at 13:21

## 2019-05-25 RX ADMIN — AMLODIPINE BESYLATE 10 MG: 10 TABLET ORAL at 17:20

## 2019-05-25 RX ADMIN — GABAPENTIN 1200 MG: 400 CAPSULE ORAL at 17:16

## 2019-05-25 RX ADMIN — TAZOBACTAM SODIUM AND PIPERACILLIN SODIUM 3.38 G: 375; 3 INJECTION, SOLUTION INTRAVENOUS at 14:47

## 2019-05-25 RX ADMIN — FUROSEMIDE 80 MG: 40 TABLET ORAL at 10:09

## 2019-05-25 RX ADMIN — TAZOBACTAM SODIUM AND PIPERACILLIN SODIUM 3.38 G: 375; 3 INJECTION, SOLUTION INTRAVENOUS at 20:43

## 2019-05-25 RX ADMIN — LISINOPRIL 20 MG: 20 TABLET ORAL at 10:08

## 2019-05-25 RX ADMIN — DULOXETINE HYDROCHLORIDE 30 MG: 30 CAPSULE, DELAYED RELEASE ORAL at 10:09

## 2019-05-25 RX ADMIN — BUSPIRONE HYDROCHLORIDE 5 MG: 5 TABLET ORAL at 10:09

## 2019-05-25 RX ADMIN — INSULIN LISPRO 2 UNITS: 100 INJECTION, SOLUTION INTRAVENOUS; SUBCUTANEOUS at 17:16

## 2019-05-25 RX ADMIN — TERAZOSIN HYDROCHLORIDE ANHYDROUS 8 MG: 1 CAPSULE ORAL at 20:43

## 2019-05-25 RX ADMIN — TAZOBACTAM SODIUM AND PIPERACILLIN SODIUM 3.38 G: 375; 3 INJECTION, SOLUTION INTRAVENOUS at 05:23

## 2019-05-25 RX ADMIN — Medication 1 CAPSULE: at 13:21

## 2019-05-25 RX ADMIN — METOPROLOL TARTRATE 75 MG: 50 TABLET ORAL at 10:08

## 2019-05-25 RX ADMIN — ENOXAPARIN SODIUM 40 MG: 40 INJECTION SUBCUTANEOUS at 20:43

## 2019-05-25 RX ADMIN — BUSPIRONE HYDROCHLORIDE 5 MG: 5 TABLET ORAL at 17:16

## 2019-05-25 RX ADMIN — SPIRONOLACTONE 25 MG: 25 TABLET ORAL at 10:07

## 2019-05-26 LAB
BACTERIA SPEC ANAEROBE CULT: NORMAL
GLUCOSE BLDC GLUCOMTR-MCNC: 135 MG/DL (ref 70–130)
GLUCOSE BLDC GLUCOMTR-MCNC: 170 MG/DL (ref 70–130)
GLUCOSE BLDC GLUCOMTR-MCNC: 197 MG/DL (ref 70–130)
GLUCOSE BLDC GLUCOMTR-MCNC: 203 MG/DL (ref 70–130)

## 2019-05-26 PROCEDURE — 25010000002 ENOXAPARIN PER 10 MG

## 2019-05-26 PROCEDURE — 97605 NEG PRS WND THER DME<=50SQCM: CPT | Performed by: PHYSICAL THERAPIST

## 2019-05-26 PROCEDURE — 25010000002 PIPERACILLIN SOD-TAZOBACTAM PER 1 G: Performed by: INTERNAL MEDICINE

## 2019-05-26 PROCEDURE — 63710000001 INSULIN DETEMIR PER 5 UNITS: Performed by: INTERNAL MEDICINE

## 2019-05-26 PROCEDURE — 94799 UNLISTED PULMONARY SVC/PX: CPT

## 2019-05-26 PROCEDURE — 94660 CPAP INITIATION&MGMT: CPT

## 2019-05-26 PROCEDURE — 25010000002 DAPTOMYCIN PER 1 MG: Performed by: INTERNAL MEDICINE

## 2019-05-26 PROCEDURE — 99232 SBSQ HOSP IP/OBS MODERATE 35: CPT | Performed by: HOSPITALIST

## 2019-05-26 PROCEDURE — 82962 GLUCOSE BLOOD TEST: CPT

## 2019-05-26 RX ORDER — HYDROCODONE BITARTRATE AND ACETAMINOPHEN 5; 325 MG/1; MG/1
1 TABLET ORAL EVERY 6 HOURS PRN
Status: DISCONTINUED | OUTPATIENT
Start: 2019-05-26 | End: 2019-05-28 | Stop reason: HOSPADM

## 2019-05-26 RX ORDER — TERAZOSIN 5 MG/1
10 CAPSULE ORAL NIGHTLY
Status: DISCONTINUED | OUTPATIENT
Start: 2019-05-26 | End: 2019-05-28 | Stop reason: HOSPADM

## 2019-05-26 RX ADMIN — BUSPIRONE HYDROCHLORIDE 5 MG: 5 TABLET ORAL at 08:21

## 2019-05-26 RX ADMIN — ENOXAPARIN SODIUM 40 MG: 40 INJECTION SUBCUTANEOUS at 22:18

## 2019-05-26 RX ADMIN — DULOXETINE HYDROCHLORIDE 30 MG: 30 CAPSULE, DELAYED RELEASE ORAL at 08:21

## 2019-05-26 RX ADMIN — INSULIN DETEMIR 30 UNITS: 100 INJECTION, SOLUTION SUBCUTANEOUS at 21:24

## 2019-05-26 RX ADMIN — GABAPENTIN 1200 MG: 400 CAPSULE ORAL at 08:22

## 2019-05-26 RX ADMIN — METOPROLOL TARTRATE 75 MG: 50 TABLET ORAL at 08:21

## 2019-05-26 RX ADMIN — NYSTATIN 1 APPLICATION: 100000 POWDER TOPICAL at 21:24

## 2019-05-26 RX ADMIN — TAZOBACTAM SODIUM AND PIPERACILLIN SODIUM 3.38 G: 375; 3 INJECTION, SOLUTION INTRAVENOUS at 14:43

## 2019-05-26 RX ADMIN — INSULIN LISPRO 2 UNITS: 100 INJECTION, SOLUTION INTRAVENOUS; SUBCUTANEOUS at 21:23

## 2019-05-26 RX ADMIN — LISINOPRIL 20 MG: 20 TABLET ORAL at 08:22

## 2019-05-26 RX ADMIN — INSULIN LISPRO 2 UNITS: 100 INJECTION, SOLUTION INTRAVENOUS; SUBCUTANEOUS at 12:01

## 2019-05-26 RX ADMIN — SPIRONOLACTONE 25 MG: 25 TABLET ORAL at 08:21

## 2019-05-26 RX ADMIN — BUSPIRONE HYDROCHLORIDE 5 MG: 5 TABLET ORAL at 16:10

## 2019-05-26 RX ADMIN — AMLODIPINE BESYLATE 10 MG: 10 TABLET ORAL at 08:21

## 2019-05-26 RX ADMIN — BUSPIRONE HYDROCHLORIDE 5 MG: 5 TABLET ORAL at 21:22

## 2019-05-26 RX ADMIN — TERAZOSIN HYDROCHLORIDE 10 MG: 5 CAPSULE ORAL at 21:22

## 2019-05-26 RX ADMIN — GABAPENTIN 1200 MG: 400 CAPSULE ORAL at 21:22

## 2019-05-26 RX ADMIN — NYSTATIN 1 APPLICATION: 100000 POWDER TOPICAL at 08:22

## 2019-05-26 RX ADMIN — DAPTOMYCIN 450 MG: 500 INJECTION, POWDER, LYOPHILIZED, FOR SOLUTION INTRAVENOUS at 08:21

## 2019-05-26 RX ADMIN — GABAPENTIN 1200 MG: 400 CAPSULE ORAL at 16:10

## 2019-05-26 RX ADMIN — FUROSEMIDE 80 MG: 40 TABLET ORAL at 08:22

## 2019-05-26 RX ADMIN — TAZOBACTAM SODIUM AND PIPERACILLIN SODIUM 3.38 G: 375; 3 INJECTION, SOLUTION INTRAVENOUS at 21:23

## 2019-05-26 RX ADMIN — SODIUM CHLORIDE, PRESERVATIVE FREE 3 ML: 5 INJECTION INTRAVENOUS at 21:23

## 2019-05-26 RX ADMIN — Medication 1 CAPSULE: at 12:01

## 2019-05-26 RX ADMIN — INSULIN LISPRO 4 UNITS: 100 INJECTION, SOLUTION INTRAVENOUS; SUBCUTANEOUS at 17:25

## 2019-05-26 RX ADMIN — METOPROLOL TARTRATE 75 MG: 50 TABLET ORAL at 21:22

## 2019-05-26 RX ADMIN — TAZOBACTAM SODIUM AND PIPERACILLIN SODIUM 3.38 G: 375; 3 INJECTION, SOLUTION INTRAVENOUS at 06:06

## 2019-05-26 RX ADMIN — HYDROCODONE BITARTRATE AND ACETAMINOPHEN 1 TABLET: 5; 325 TABLET ORAL at 21:23

## 2019-05-26 RX ADMIN — INSULIN DETEMIR 30 UNITS: 100 INJECTION, SOLUTION SUBCUTANEOUS at 08:21

## 2019-05-27 LAB
GLUCOSE BLDC GLUCOMTR-MCNC: 146 MG/DL (ref 70–130)
GLUCOSE BLDC GLUCOMTR-MCNC: 203 MG/DL (ref 70–130)
GLUCOSE BLDC GLUCOMTR-MCNC: 209 MG/DL (ref 70–130)
GLUCOSE BLDC GLUCOMTR-MCNC: 305 MG/DL (ref 70–130)

## 2019-05-27 PROCEDURE — 97605 NEG PRS WND THER DME<=50SQCM: CPT | Performed by: PHYSICAL THERAPIST

## 2019-05-27 PROCEDURE — 82962 GLUCOSE BLOOD TEST: CPT

## 2019-05-27 PROCEDURE — 25010000002 ENOXAPARIN PER 10 MG

## 2019-05-27 PROCEDURE — 63710000001 INSULIN DETEMIR PER 5 UNITS: Performed by: INTERNAL MEDICINE

## 2019-05-27 PROCEDURE — 99232 SBSQ HOSP IP/OBS MODERATE 35: CPT | Performed by: HOSPITALIST

## 2019-05-27 PROCEDURE — 25010000002 PIPERACILLIN SOD-TAZOBACTAM PER 1 G: Performed by: INTERNAL MEDICINE

## 2019-05-27 RX ADMIN — METOPROLOL TARTRATE 75 MG: 50 TABLET ORAL at 20:22

## 2019-05-27 RX ADMIN — NYSTATIN 1 APPLICATION: 100000 POWDER TOPICAL at 20:22

## 2019-05-27 RX ADMIN — INSULIN DETEMIR 30 UNITS: 100 INJECTION, SOLUTION SUBCUTANEOUS at 08:25

## 2019-05-27 RX ADMIN — FUROSEMIDE 80 MG: 40 TABLET ORAL at 08:24

## 2019-05-27 RX ADMIN — Medication 1 CAPSULE: at 12:10

## 2019-05-27 RX ADMIN — TAZOBACTAM SODIUM AND PIPERACILLIN SODIUM 3.38 G: 375; 3 INJECTION, SOLUTION INTRAVENOUS at 14:25

## 2019-05-27 RX ADMIN — TAZOBACTAM SODIUM AND PIPERACILLIN SODIUM 3.38 G: 375; 3 INJECTION, SOLUTION INTRAVENOUS at 05:43

## 2019-05-27 RX ADMIN — BUSPIRONE HYDROCHLORIDE 5 MG: 5 TABLET ORAL at 20:22

## 2019-05-27 RX ADMIN — BUSPIRONE HYDROCHLORIDE 5 MG: 5 TABLET ORAL at 08:24

## 2019-05-27 RX ADMIN — TERAZOSIN HYDROCHLORIDE 10 MG: 5 CAPSULE ORAL at 20:22

## 2019-05-27 RX ADMIN — INSULIN LISPRO 4 UNITS: 100 INJECTION, SOLUTION INTRAVENOUS; SUBCUTANEOUS at 16:46

## 2019-05-27 RX ADMIN — AMLODIPINE BESYLATE 10 MG: 10 TABLET ORAL at 08:24

## 2019-05-27 RX ADMIN — LISINOPRIL 20 MG: 20 TABLET ORAL at 08:24

## 2019-05-27 RX ADMIN — SPIRONOLACTONE 25 MG: 25 TABLET ORAL at 08:24

## 2019-05-27 RX ADMIN — BUSPIRONE HYDROCHLORIDE 5 MG: 5 TABLET ORAL at 16:39

## 2019-05-27 RX ADMIN — NYSTATIN 1 APPLICATION: 100000 POWDER TOPICAL at 08:24

## 2019-05-27 RX ADMIN — HYDROCODONE BITARTRATE AND ACETAMINOPHEN 1 TABLET: 5; 325 TABLET ORAL at 23:23

## 2019-05-27 RX ADMIN — TAZOBACTAM SODIUM AND PIPERACILLIN SODIUM 3.38 G: 375; 3 INJECTION, SOLUTION INTRAVENOUS at 20:24

## 2019-05-27 RX ADMIN — INSULIN LISPRO 4 UNITS: 100 INJECTION, SOLUTION INTRAVENOUS; SUBCUTANEOUS at 20:22

## 2019-05-27 RX ADMIN — GABAPENTIN 1200 MG: 400 CAPSULE ORAL at 20:22

## 2019-05-27 RX ADMIN — INSULIN LISPRO 7 UNITS: 100 INJECTION, SOLUTION INTRAVENOUS; SUBCUTANEOUS at 08:25

## 2019-05-27 RX ADMIN — DULOXETINE HYDROCHLORIDE 30 MG: 30 CAPSULE, DELAYED RELEASE ORAL at 08:24

## 2019-05-27 RX ADMIN — INSULIN DETEMIR 30 UNITS: 100 INJECTION, SOLUTION SUBCUTANEOUS at 20:24

## 2019-05-27 RX ADMIN — METOPROLOL TARTRATE 75 MG: 50 TABLET ORAL at 08:24

## 2019-05-27 RX ADMIN — ENOXAPARIN SODIUM 40 MG: 40 INJECTION SUBCUTANEOUS at 20:23

## 2019-05-27 RX ADMIN — GABAPENTIN 1200 MG: 400 CAPSULE ORAL at 16:39

## 2019-05-27 RX ADMIN — GABAPENTIN 1200 MG: 400 CAPSULE ORAL at 08:24

## 2019-05-28 VITALS
WEIGHT: 315 LBS | BODY MASS INDEX: 37.19 KG/M2 | HEIGHT: 77 IN | SYSTOLIC BLOOD PRESSURE: 147 MMHG | OXYGEN SATURATION: 91 % | DIASTOLIC BLOOD PRESSURE: 92 MMHG | TEMPERATURE: 97.6 F | HEART RATE: 74 BPM | RESPIRATION RATE: 16 BRPM

## 2019-05-28 LAB
GLUCOSE BLDC GLUCOMTR-MCNC: 147 MG/DL (ref 70–130)
GLUCOSE BLDC GLUCOMTR-MCNC: 191 MG/DL (ref 70–130)
GLUCOSE BLDC GLUCOMTR-MCNC: 208 MG/DL (ref 70–130)

## 2019-05-28 PROCEDURE — 99239 HOSP IP/OBS DSCHRG MGMT >30: CPT | Performed by: HOSPITALIST

## 2019-05-28 PROCEDURE — 97605 NEG PRS WND THER DME<=50SQCM: CPT

## 2019-05-28 PROCEDURE — 97110 THERAPEUTIC EXERCISES: CPT | Performed by: PHYSICAL THERAPIST

## 2019-05-28 PROCEDURE — 63710000001 INSULIN DETEMIR PER 5 UNITS: Performed by: INTERNAL MEDICINE

## 2019-05-28 PROCEDURE — 25010000002 PIPERACILLIN SOD-TAZOBACTAM PER 1 G: Performed by: INTERNAL MEDICINE

## 2019-05-28 PROCEDURE — 82962 GLUCOSE BLOOD TEST: CPT

## 2019-05-28 PROCEDURE — 97530 THERAPEUTIC ACTIVITIES: CPT | Performed by: PHYSICAL THERAPIST

## 2019-05-28 RX ORDER — LISINOPRIL 20 MG/1
20 TABLET ORAL
Qty: 30 TABLET | Refills: 0 | Status: SHIPPED | OUTPATIENT
Start: 2019-05-29 | End: 2019-06-11 | Stop reason: SDUPTHER

## 2019-05-28 RX ORDER — SPIRONOLACTONE 25 MG/1
25 TABLET ORAL DAILY
Qty: 30 TABLET | Refills: 0 | Status: SHIPPED | OUTPATIENT
Start: 2019-05-29 | End: 2019-06-11 | Stop reason: SDUPTHER

## 2019-05-28 RX ORDER — FUROSEMIDE 80 MG
80 TABLET ORAL DAILY
Qty: 30 TABLET | Refills: 0 | Status: SHIPPED | OUTPATIENT
Start: 2019-05-29 | End: 2019-06-11 | Stop reason: SDUPTHER

## 2019-05-28 RX ORDER — DOXYCYCLINE 100 MG/1
100 CAPSULE ORAL EVERY 12 HOURS SCHEDULED
Status: DISCONTINUED | OUTPATIENT
Start: 2019-05-28 | End: 2019-05-28 | Stop reason: HOSPADM

## 2019-05-28 RX ORDER — DOXYCYCLINE 100 MG/1
100 CAPSULE ORAL EVERY 12 HOURS SCHEDULED
Qty: 19 CAPSULE | Refills: 0 | Status: SHIPPED | OUTPATIENT
Start: 2019-05-28 | End: 2019-06-02 | Stop reason: HOSPADM

## 2019-05-28 RX ADMIN — METOPROLOL TARTRATE 75 MG: 50 TABLET ORAL at 08:54

## 2019-05-28 RX ADMIN — SPIRONOLACTONE 25 MG: 25 TABLET ORAL at 08:52

## 2019-05-28 RX ADMIN — LISINOPRIL 20 MG: 20 TABLET ORAL at 08:52

## 2019-05-28 RX ADMIN — BUSPIRONE HYDROCHLORIDE 5 MG: 5 TABLET ORAL at 08:52

## 2019-05-28 RX ADMIN — Medication 1 CAPSULE: at 12:42

## 2019-05-28 RX ADMIN — BUSPIRONE HYDROCHLORIDE 5 MG: 5 TABLET ORAL at 17:22

## 2019-05-28 RX ADMIN — NYSTATIN 1 APPLICATION: 100000 POWDER TOPICAL at 08:51

## 2019-05-28 RX ADMIN — GABAPENTIN 1200 MG: 400 CAPSULE ORAL at 17:21

## 2019-05-28 RX ADMIN — INSULIN LISPRO 2 UNITS: 100 INJECTION, SOLUTION INTRAVENOUS; SUBCUTANEOUS at 12:42

## 2019-05-28 RX ADMIN — INSULIN DETEMIR 30 UNITS: 100 INJECTION, SOLUTION SUBCUTANEOUS at 08:55

## 2019-05-28 RX ADMIN — DOXYCYCLINE 100 MG: 100 CAPSULE ORAL at 12:42

## 2019-05-28 RX ADMIN — TAZOBACTAM SODIUM AND PIPERACILLIN SODIUM 3.38 G: 375; 3 INJECTION, SOLUTION INTRAVENOUS at 06:40

## 2019-05-28 RX ADMIN — FUROSEMIDE 80 MG: 40 TABLET ORAL at 08:51

## 2019-05-28 RX ADMIN — GABAPENTIN 1200 MG: 400 CAPSULE ORAL at 08:51

## 2019-05-28 RX ADMIN — AMLODIPINE BESYLATE 10 MG: 10 TABLET ORAL at 08:52

## 2019-05-28 RX ADMIN — DULOXETINE HYDROCHLORIDE 30 MG: 30 CAPSULE, DELAYED RELEASE ORAL at 08:52

## 2019-05-29 ENCOUNTER — READMISSION MANAGEMENT (OUTPATIENT)
Dept: CALL CENTER | Facility: HOSPITAL | Age: 49
End: 2019-05-29

## 2019-05-29 NOTE — OUTREACH NOTE
Prep Survey      Responses   Facility patient discharged from?  [facility]   Is patient eligible?  Yes   Discharge diagnosis  Anasarca/pedal edema, abdominal wall abscess, hx [place] gangrene, Depression/bipolar, DM poorly controlled, HLD, HTN, DUNLAP with cirrhosis, neuropathy, PVD, BPH,    Does the patient have one of the following disease processes/diagnoses(primary or secondary)?  CHF   Does the patient have Home health ordered?  Yes   What is the Home health agency?   [facility]   Is there a DME ordered?  Yes   What DME was ordered?  Commode chair, hospital bed per [facility]   Comments regarding appointments  Pt to schedule follow up appointments.    Prep survey completed?  Yes          Veronica Connelly RN

## 2019-05-30 ENCOUNTER — APPOINTMENT (OUTPATIENT)
Dept: CT IMAGING | Facility: HOSPITAL | Age: 49
End: 2019-05-30

## 2019-05-30 ENCOUNTER — HOSPITAL ENCOUNTER (INPATIENT)
Facility: HOSPITAL | Age: 49
LOS: 2 days | Discharge: HOME | End: 2019-06-02
Attending: EMERGENCY MEDICINE | Admitting: HOSPITALIST

## 2019-05-30 ENCOUNTER — READMISSION MANAGEMENT (OUTPATIENT)
Dept: CALL CENTER | Facility: HOSPITAL | Age: 49
End: 2019-05-30

## 2019-05-30 ENCOUNTER — APPOINTMENT (OUTPATIENT)
Dept: GENERAL RADIOLOGY | Facility: HOSPITAL | Age: 49
End: 2019-05-30

## 2019-05-30 DIAGNOSIS — R11.2 INTRACTABLE VOMITING WITH NAUSEA, UNSPECIFIED VOMITING TYPE: Primary | ICD-10-CM

## 2019-05-30 DIAGNOSIS — Z79.4 TYPE 2 DIABETES MELLITUS WITH DIABETIC PERIPHERAL ANGIOPATHY AND GANGRENE, WITH LONG-TERM CURRENT USE OF INSULIN (HCC): Chronic | ICD-10-CM

## 2019-05-30 DIAGNOSIS — E11.52 TYPE 2 DIABETES MELLITUS WITH DIABETIC PERIPHERAL ANGIOPATHY AND GANGRENE, WITH LONG-TERM CURRENT USE OF INSULIN (HCC): Chronic | ICD-10-CM

## 2019-05-30 DIAGNOSIS — I10 UNCONTROLLED HYPERTENSION: ICD-10-CM

## 2019-05-30 PROBLEM — R77.8 ELEVATED TROPONIN: Status: ACTIVE | Noted: 2019-05-30

## 2019-05-30 LAB
ALBUMIN SERPL-MCNC: 3.8 G/DL (ref 3.5–5.2)
ALBUMIN/GLOB SERPL: 0.9 G/DL
ALP SERPL-CCNC: 157 U/L (ref 39–117)
ALT SERPL W P-5'-P-CCNC: 18 U/L (ref 1–41)
ANION GAP SERPL CALCULATED.3IONS-SCNC: 14 MMOL/L
AST SERPL-CCNC: 19 U/L (ref 1–40)
BACTERIA UR QL AUTO: NORMAL /HPF
BASOPHILS # BLD AUTO: 0.04 10*3/MM3 (ref 0–0.2)
BASOPHILS NFR BLD AUTO: 0.4 % (ref 0–1.5)
BILIRUB SERPL-MCNC: 0.6 MG/DL (ref 0.2–1.2)
BILIRUB UR QL STRIP: NEGATIVE
BUN BLD-MCNC: 11 MG/DL (ref 6–20)
BUN/CREAT SERPL: 23.4 (ref 7–25)
CALCIUM SPEC-SCNC: 10.2 MG/DL (ref 8.6–10.5)
CHLORIDE SERPL-SCNC: 97 MMOL/L (ref 98–107)
CLARITY UR: CLEAR
CO2 SERPL-SCNC: 24 MMOL/L (ref 22–29)
COLOR UR: YELLOW
CREAT BLD-MCNC: 0.47 MG/DL (ref 0.76–1.27)
DEPRECATED RDW RBC AUTO: 43.9 FL (ref 37–54)
EOSINOPHIL # BLD AUTO: 0.06 10*3/MM3 (ref 0–0.4)
EOSINOPHIL NFR BLD AUTO: 0.6 % (ref 0.3–6.2)
ERYTHROCYTE [DISTWIDTH] IN BLOOD BY AUTOMATED COUNT: 14.2 % (ref 12.3–15.4)
GFR SERPL CREATININE-BSD FRML MDRD: >150 ML/MIN/1.73
GLOBULIN UR ELPH-MCNC: 4.2 GM/DL
GLUCOSE BLD-MCNC: 195 MG/DL (ref 65–99)
GLUCOSE BLDC GLUCOMTR-MCNC: 193 MG/DL (ref 70–130)
GLUCOSE UR STRIP-MCNC: ABNORMAL MG/DL
HCT VFR BLD AUTO: 39.4 % (ref 37.5–51)
HGB BLD-MCNC: 12.7 G/DL (ref 13–17.7)
HGB UR QL STRIP.AUTO: NEGATIVE
HOLD SPECIMEN: NORMAL
HOLD SPECIMEN: NORMAL
HYALINE CASTS UR QL AUTO: NORMAL /LPF
IMM GRANULOCYTES # BLD AUTO: 0.04 10*3/MM3 (ref 0–0.05)
IMM GRANULOCYTES NFR BLD AUTO: 0.4 % (ref 0–0.5)
KETONES UR QL STRIP: ABNORMAL
LEUKOCYTE ESTERASE UR QL STRIP.AUTO: NEGATIVE
LIPASE SERPL-CCNC: 10 U/L (ref 13–60)
LYMPHOCYTES # BLD AUTO: 1.08 10*3/MM3 (ref 0.7–3.1)
LYMPHOCYTES NFR BLD AUTO: 11.4 % (ref 19.6–45.3)
MCH RBC QN AUTO: 27.7 PG (ref 26.6–33)
MCHC RBC AUTO-ENTMCNC: 32.2 G/DL (ref 31.5–35.7)
MCV RBC AUTO: 86 FL (ref 79–97)
MONOCYTES # BLD AUTO: 0.35 10*3/MM3 (ref 0.1–0.9)
MONOCYTES NFR BLD AUTO: 3.7 % (ref 5–12)
NEUTROPHILS # BLD AUTO: 7.93 10*3/MM3 (ref 1.7–7)
NEUTROPHILS NFR BLD AUTO: 83.9 % (ref 42.7–76)
NITRITE UR QL STRIP: NEGATIVE
PH UR STRIP.AUTO: 7 [PH] (ref 5–8)
PLATELET # BLD AUTO: 231 10*3/MM3 (ref 140–450)
PMV BLD AUTO: 11.1 FL (ref 6–12)
POTASSIUM BLD-SCNC: 3.8 MMOL/L (ref 3.5–5.2)
PROT SERPL-MCNC: 8 G/DL (ref 6–8.5)
PROT UR QL STRIP: ABNORMAL
RBC # BLD AUTO: 4.58 10*6/MM3 (ref 4.14–5.8)
RBC # UR: NORMAL /HPF
REF LAB TEST METHOD: NORMAL
SODIUM BLD-SCNC: 135 MMOL/L (ref 136–145)
SP GR UR STRIP: 1.02 (ref 1–1.03)
SQUAMOUS #/AREA URNS HPF: NORMAL /HPF
TROPONIN T SERPL-MCNC: 0.04 NG/ML (ref 0–0.03)
TROPONIN T SERPL-MCNC: 0.05 NG/ML (ref 0–0.03)
TROPONIN T SERPL-MCNC: 0.06 NG/ML (ref 0–0.03)
UROBILINOGEN UR QL STRIP: ABNORMAL
WBC NRBC COR # BLD: 9.46 10*3/MM3 (ref 3.4–10.8)
WBC UR QL AUTO: NORMAL /HPF
WHOLE BLOOD HOLD SPECIMEN: NORMAL
WHOLE BLOOD HOLD SPECIMEN: NORMAL

## 2019-05-30 PROCEDURE — 25010000002 ONDANSETRON PER 1 MG: Performed by: NURSE PRACTITIONER

## 2019-05-30 PROCEDURE — P9612 CATHETERIZE FOR URINE SPEC: HCPCS

## 2019-05-30 PROCEDURE — 25010000002 ENOXAPARIN PER 10 MG: Performed by: NURSE PRACTITIONER

## 2019-05-30 PROCEDURE — 81001 URINALYSIS AUTO W/SCOPE: CPT | Performed by: EMERGENCY MEDICINE

## 2019-05-30 PROCEDURE — G0378 HOSPITAL OBSERVATION PER HR: HCPCS

## 2019-05-30 PROCEDURE — 84484 ASSAY OF TROPONIN QUANT: CPT | Performed by: NURSE PRACTITIONER

## 2019-05-30 PROCEDURE — 99284 EMERGENCY DEPT VISIT MOD MDM: CPT

## 2019-05-30 PROCEDURE — 80053 COMPREHEN METABOLIC PANEL: CPT | Performed by: EMERGENCY MEDICINE

## 2019-05-30 PROCEDURE — 71045 X-RAY EXAM CHEST 1 VIEW: CPT

## 2019-05-30 PROCEDURE — 82962 GLUCOSE BLOOD TEST: CPT

## 2019-05-30 PROCEDURE — 99223 1ST HOSP IP/OBS HIGH 75: CPT | Performed by: HOSPITALIST

## 2019-05-30 PROCEDURE — 25010000002 HYDRALAZINE PER 20 MG: Performed by: NURSE PRACTITIONER

## 2019-05-30 PROCEDURE — 25010000002 ONDANSETRON PER 1 MG: Performed by: EMERGENCY MEDICINE

## 2019-05-30 PROCEDURE — 84484 ASSAY OF TROPONIN QUANT: CPT | Performed by: EMERGENCY MEDICINE

## 2019-05-30 PROCEDURE — 83690 ASSAY OF LIPASE: CPT | Performed by: EMERGENCY MEDICINE

## 2019-05-30 PROCEDURE — 93005 ELECTROCARDIOGRAM TRACING: CPT | Performed by: EMERGENCY MEDICINE

## 2019-05-30 PROCEDURE — 85025 COMPLETE CBC W/AUTO DIFF WBC: CPT | Performed by: EMERGENCY MEDICINE

## 2019-05-30 PROCEDURE — 63710000001 INSULIN LISPRO (HUMAN) PER 5 UNITS: Performed by: NURSE PRACTITIONER

## 2019-05-30 PROCEDURE — 74176 CT ABD & PELVIS W/O CONTRAST: CPT

## 2019-05-30 RX ORDER — SODIUM CHLORIDE 9 MG/ML
100 INJECTION, SOLUTION INTRAVENOUS ONCE
Status: COMPLETED | OUTPATIENT
Start: 2019-05-30 | End: 2019-05-31

## 2019-05-30 RX ORDER — L.ACID,PARA/B.BIFIDUM/S.THERM 8B CELL
1 CAPSULE ORAL DAILY
Status: DISCONTINUED | OUTPATIENT
Start: 2019-05-31 | End: 2019-06-02 | Stop reason: HOSPADM

## 2019-05-30 RX ORDER — LISINOPRIL 20 MG/1
20 TABLET ORAL
Status: DISCONTINUED | OUTPATIENT
Start: 2019-05-31 | End: 2019-06-02 | Stop reason: HOSPADM

## 2019-05-30 RX ORDER — AMLODIPINE BESYLATE 5 MG/1
5 TABLET ORAL ONCE
Status: COMPLETED | OUTPATIENT
Start: 2019-05-30 | End: 2019-05-30

## 2019-05-30 RX ORDER — LISINOPRIL 10 MG/1
20 TABLET ORAL ONCE
Status: COMPLETED | OUTPATIENT
Start: 2019-05-30 | End: 2019-05-30

## 2019-05-30 RX ORDER — GABAPENTIN 400 MG/1
1200 CAPSULE ORAL EVERY 8 HOURS SCHEDULED
Status: DISCONTINUED | OUTPATIENT
Start: 2019-05-30 | End: 2019-06-02 | Stop reason: HOSPADM

## 2019-05-30 RX ORDER — DULOXETIN HYDROCHLORIDE 30 MG/1
30 CAPSULE, DELAYED RELEASE ORAL DAILY
Status: DISCONTINUED | OUTPATIENT
Start: 2019-05-31 | End: 2019-06-02 | Stop reason: HOSPADM

## 2019-05-30 RX ORDER — HYDROCODONE BITARTRATE AND ACETAMINOPHEN 10; 325 MG/1; MG/1
1 TABLET ORAL 3 TIMES DAILY PRN
Status: DISCONTINUED | OUTPATIENT
Start: 2019-05-30 | End: 2019-06-02 | Stop reason: HOSPADM

## 2019-05-30 RX ORDER — DEXTROSE MONOHYDRATE 25 G/50ML
25 INJECTION, SOLUTION INTRAVENOUS
Status: DISCONTINUED | OUTPATIENT
Start: 2019-05-30 | End: 2019-06-02 | Stop reason: HOSPADM

## 2019-05-30 RX ORDER — BISACODYL 10 MG
10 SUPPOSITORY, RECTAL RECTAL DAILY PRN
Status: DISCONTINUED | OUTPATIENT
Start: 2019-05-30 | End: 2019-06-02 | Stop reason: HOSPADM

## 2019-05-30 RX ORDER — HYDRALAZINE HYDROCHLORIDE 20 MG/ML
10 INJECTION INTRAMUSCULAR; INTRAVENOUS EVERY 6 HOURS PRN
Status: DISCONTINUED | OUTPATIENT
Start: 2019-05-30 | End: 2019-06-02 | Stop reason: HOSPADM

## 2019-05-30 RX ORDER — SODIUM CHLORIDE 0.9 % (FLUSH) 0.9 %
3-10 SYRINGE (ML) INJECTION AS NEEDED
Status: DISCONTINUED | OUTPATIENT
Start: 2019-05-30 | End: 2019-06-02 | Stop reason: HOSPADM

## 2019-05-30 RX ORDER — SODIUM CHLORIDE 0.9 % (FLUSH) 0.9 %
10 SYRINGE (ML) INJECTION AS NEEDED
Status: DISCONTINUED | OUTPATIENT
Start: 2019-05-30 | End: 2019-06-02 | Stop reason: HOSPADM

## 2019-05-30 RX ORDER — TAMSULOSIN HYDROCHLORIDE 0.4 MG/1
0.8 CAPSULE ORAL NIGHTLY
Status: DISCONTINUED | OUTPATIENT
Start: 2019-05-30 | End: 2019-06-02 | Stop reason: HOSPADM

## 2019-05-30 RX ORDER — ACETAMINOPHEN 325 MG/1
650 TABLET ORAL EVERY 4 HOURS PRN
Status: DISCONTINUED | OUTPATIENT
Start: 2019-05-30 | End: 2019-06-02 | Stop reason: HOSPADM

## 2019-05-30 RX ORDER — DOXYCYCLINE 100 MG/1
100 CAPSULE ORAL ONCE
Status: COMPLETED | OUTPATIENT
Start: 2019-05-30 | End: 2019-05-30

## 2019-05-30 RX ORDER — ONDANSETRON 2 MG/ML
4 INJECTION INTRAMUSCULAR; INTRAVENOUS ONCE
Status: COMPLETED | OUTPATIENT
Start: 2019-05-30 | End: 2019-05-30

## 2019-05-30 RX ORDER — ONDANSETRON 4 MG/1
4 TABLET, FILM COATED ORAL EVERY 6 HOURS PRN
Status: DISCONTINUED | OUTPATIENT
Start: 2019-05-30 | End: 2019-06-02 | Stop reason: HOSPADM

## 2019-05-30 RX ORDER — AMLODIPINE BESYLATE 5 MG/1
5 TABLET ORAL DAILY
COMMUNITY
End: 2019-05-30

## 2019-05-30 RX ORDER — SPIRONOLACTONE 25 MG/1
25 TABLET ORAL ONCE
Status: COMPLETED | OUTPATIENT
Start: 2019-05-30 | End: 2019-05-30

## 2019-05-30 RX ORDER — BISACODYL 5 MG/1
5 TABLET, DELAYED RELEASE ORAL DAILY PRN
Status: DISCONTINUED | OUTPATIENT
Start: 2019-05-30 | End: 2019-06-02 | Stop reason: HOSPADM

## 2019-05-30 RX ORDER — ONDANSETRON 2 MG/ML
4 INJECTION INTRAMUSCULAR; INTRAVENOUS EVERY 6 HOURS PRN
Status: DISCONTINUED | OUTPATIENT
Start: 2019-05-30 | End: 2019-06-02 | Stop reason: HOSPADM

## 2019-05-30 RX ORDER — LABETALOL HYDROCHLORIDE 5 MG/ML
20 INJECTION, SOLUTION INTRAVENOUS ONCE
Status: COMPLETED | OUTPATIENT
Start: 2019-05-30 | End: 2019-05-30

## 2019-05-30 RX ORDER — NICOTINE POLACRILEX 4 MG
15 LOZENGE BUCCAL
Status: DISCONTINUED | OUTPATIENT
Start: 2019-05-30 | End: 2019-06-02 | Stop reason: HOSPADM

## 2019-05-30 RX ORDER — SODIUM CHLORIDE 0.9 % (FLUSH) 0.9 %
3 SYRINGE (ML) INJECTION EVERY 12 HOURS SCHEDULED
Status: DISCONTINUED | OUTPATIENT
Start: 2019-05-30 | End: 2019-06-02 | Stop reason: HOSPADM

## 2019-05-30 RX ADMIN — DOXYCYCLINE 100 MG: 100 CAPSULE ORAL at 16:20

## 2019-05-30 RX ADMIN — SPIRONOLACTONE 25 MG: 25 TABLET, FILM COATED ORAL at 16:20

## 2019-05-30 RX ADMIN — AMLODIPINE BESYLATE 5 MG: 5 TABLET ORAL at 16:19

## 2019-05-30 RX ADMIN — LABETALOL 20 MG/4 ML (5 MG/ML) INTRAVENOUS SYRINGE 20 MG: at 14:14

## 2019-05-30 RX ADMIN — HYDRALAZINE HYDROCHLORIDE 10 MG: 20 INJECTION INTRAMUSCULAR; INTRAVENOUS at 23:32

## 2019-05-30 RX ADMIN — LISINOPRIL 20 MG: 10 TABLET ORAL at 16:19

## 2019-05-30 RX ADMIN — INSULIN LISPRO 2 UNITS: 100 INJECTION, SOLUTION INTRAVENOUS; SUBCUTANEOUS at 22:09

## 2019-05-30 RX ADMIN — ENOXAPARIN SODIUM 40 MG: 40 INJECTION SUBCUTANEOUS at 22:09

## 2019-05-30 RX ADMIN — SODIUM CHLORIDE 100 ML/HR: 9 INJECTION, SOLUTION INTRAVENOUS at 22:10

## 2019-05-30 RX ADMIN — ONDANSETRON 4 MG: 2 INJECTION INTRAMUSCULAR; INTRAVENOUS at 15:34

## 2019-05-30 RX ADMIN — METOPROLOL TARTRATE 75 MG: 50 TABLET ORAL at 16:18

## 2019-05-30 RX ADMIN — ONDANSETRON 4 MG: 2 INJECTION INTRAMUSCULAR; INTRAVENOUS at 22:09

## 2019-05-30 NOTE — OUTREACH NOTE
CHF Week 1 Survey      Responses   Facility patient discharged from?  [facility]   Does the patient have one of the following disease processes/diagnoses(primary or secondary)?  CHF   Is there a successful TCM telephone encounter documented?  No   CHF Week 1 attempt successful?  No   Unsuccessful attempts  Attempt 1 [pt is in ED]          Diane Dominique RN

## 2019-05-31 ENCOUNTER — READMISSION MANAGEMENT (OUTPATIENT)
Dept: CALL CENTER | Facility: HOSPITAL | Age: 49
End: 2019-05-31

## 2019-05-31 LAB
AMMONIA BLD-SCNC: 40 UMOL/L (ref 16–60)
ANION GAP SERPL CALCULATED.3IONS-SCNC: 15 MMOL/L
BUN BLD-MCNC: 17 MG/DL (ref 6–20)
BUN/CREAT SERPL: 30.9 (ref 7–25)
CALCIUM SPEC-SCNC: 9.2 MG/DL (ref 8.6–10.5)
CHLORIDE SERPL-SCNC: 100 MMOL/L (ref 98–107)
CO2 SERPL-SCNC: 22 MMOL/L (ref 22–29)
CREAT BLD-MCNC: 0.55 MG/DL (ref 0.76–1.27)
DEPRECATED RDW RBC AUTO: 43.5 FL (ref 37–54)
ERYTHROCYTE [DISTWIDTH] IN BLOOD BY AUTOMATED COUNT: 14.2 % (ref 12.3–15.4)
GFR SERPL CREATININE-BSD FRML MDRD: >150 ML/MIN/1.73
GLUCOSE BLD-MCNC: 175 MG/DL (ref 65–99)
GLUCOSE BLDC GLUCOMTR-MCNC: 138 MG/DL (ref 70–130)
GLUCOSE BLDC GLUCOMTR-MCNC: 170 MG/DL (ref 70–130)
GLUCOSE BLDC GLUCOMTR-MCNC: 189 MG/DL (ref 70–130)
HCT VFR BLD AUTO: 34.7 % (ref 37.5–51)
HGB BLD-MCNC: 11.4 G/DL (ref 13–17.7)
MCH RBC QN AUTO: 27.6 PG (ref 26.6–33)
MCHC RBC AUTO-ENTMCNC: 32.9 G/DL (ref 31.5–35.7)
MCV RBC AUTO: 84 FL (ref 79–97)
PLATELET # BLD AUTO: 240 10*3/MM3 (ref 140–450)
PMV BLD AUTO: 10.9 FL (ref 6–12)
POTASSIUM BLD-SCNC: 3.6 MMOL/L (ref 3.5–5.2)
RBC # BLD AUTO: 4.13 10*6/MM3 (ref 4.14–5.8)
SODIUM BLD-SCNC: 137 MMOL/L (ref 136–145)
TROPONIN T SERPL-MCNC: 0.05 NG/ML (ref 0–0.03)
TROPONIN T SERPL-MCNC: 0.06 NG/ML (ref 0–0.03)
WBC NRBC COR # BLD: 11.07 10*3/MM3 (ref 3.4–10.8)

## 2019-05-31 PROCEDURE — 25010000002 ONDANSETRON PER 1 MG: Performed by: NURSE PRACTITIONER

## 2019-05-31 PROCEDURE — 82140 ASSAY OF AMMONIA: CPT | Performed by: HOSPITALIST

## 2019-05-31 PROCEDURE — 85027 COMPLETE CBC AUTOMATED: CPT | Performed by: NURSE PRACTITIONER

## 2019-05-31 PROCEDURE — 25010000002 HYDRALAZINE PER 20 MG: Performed by: NURSE PRACTITIONER

## 2019-05-31 PROCEDURE — 82962 GLUCOSE BLOOD TEST: CPT

## 2019-05-31 PROCEDURE — 84484 ASSAY OF TROPONIN QUANT: CPT | Performed by: NURSE PRACTITIONER

## 2019-05-31 PROCEDURE — 99233 SBSQ HOSP IP/OBS HIGH 50: CPT | Performed by: HOSPITALIST

## 2019-05-31 PROCEDURE — 25010000002 PROMETHAZINE PER 50 MG: Performed by: NURSE PRACTITIONER

## 2019-05-31 PROCEDURE — G0108 DIAB MANAGE TRN  PER INDIV: HCPCS | Performed by: REGISTERED NURSE

## 2019-05-31 PROCEDURE — 25010000002 PIPERACILLIN SOD-TAZOBACTAM PER 1 G: Performed by: INTERNAL MEDICINE

## 2019-05-31 PROCEDURE — 80048 BASIC METABOLIC PNL TOTAL CA: CPT | Performed by: NURSE PRACTITIONER

## 2019-05-31 PROCEDURE — 25010000002 ENOXAPARIN PER 10 MG: Performed by: NURSE PRACTITIONER

## 2019-05-31 RX ORDER — PROMETHAZINE HYDROCHLORIDE 25 MG/ML
6.25 INJECTION, SOLUTION INTRAMUSCULAR; INTRAVENOUS EVERY 6 HOURS PRN
Status: DISCONTINUED | OUTPATIENT
Start: 2019-05-31 | End: 2019-05-31

## 2019-05-31 RX ORDER — PROMETHAZINE HYDROCHLORIDE 25 MG/ML
12.5 INJECTION, SOLUTION INTRAMUSCULAR; INTRAVENOUS EVERY 6 HOURS PRN
Status: DISCONTINUED | OUTPATIENT
Start: 2019-05-31 | End: 2019-06-02 | Stop reason: HOSPADM

## 2019-05-31 RX ADMIN — SODIUM CHLORIDE, PRESERVATIVE FREE 3 ML: 5 INJECTION INTRAVENOUS at 23:48

## 2019-05-31 RX ADMIN — INSULIN LISPRO 2 UNITS: 100 INJECTION, SOLUTION INTRAVENOUS; SUBCUTANEOUS at 13:25

## 2019-05-31 RX ADMIN — TAMSULOSIN HYDROCHLORIDE 0.8 MG: 0.4 CAPSULE ORAL at 23:47

## 2019-05-31 RX ADMIN — METOPROLOL TARTRATE 75 MG: 50 TABLET ORAL at 08:49

## 2019-05-31 RX ADMIN — GABAPENTIN 1200 MG: 400 CAPSULE ORAL at 23:47

## 2019-05-31 RX ADMIN — PROMETHAZINE HYDROCHLORIDE 6.25 MG: 25 INJECTION INTRAMUSCULAR; INTRAVENOUS at 08:48

## 2019-05-31 RX ADMIN — PROMETHAZINE HYDROCHLORIDE 6.25 MG: 25 INJECTION INTRAMUSCULAR; INTRAVENOUS at 00:43

## 2019-05-31 RX ADMIN — TAZOBACTAM SODIUM AND PIPERACILLIN SODIUM 3.38 G: 375; 3 INJECTION, SOLUTION INTRAVENOUS at 18:27

## 2019-05-31 RX ADMIN — HYDRALAZINE HYDROCHLORIDE 10 MG: 20 INJECTION INTRAMUSCULAR; INTRAVENOUS at 08:48

## 2019-05-31 RX ADMIN — LISINOPRIL 20 MG: 20 TABLET ORAL at 08:49

## 2019-05-31 RX ADMIN — ONDANSETRON HYDROCHLORIDE 4 MG: 4 TABLET, FILM COATED ORAL at 08:49

## 2019-05-31 RX ADMIN — TAZOBACTAM SODIUM AND PIPERACILLIN SODIUM 3.38 G: 375; 3 INJECTION, SOLUTION INTRAVENOUS at 13:26

## 2019-05-31 RX ADMIN — DOXYCYCLINE 100 MG: 100 INJECTION, POWDER, LYOPHILIZED, FOR SOLUTION INTRAVENOUS at 04:58

## 2019-05-31 RX ADMIN — ONDANSETRON 4 MG: 2 INJECTION INTRAMUSCULAR; INTRAVENOUS at 04:57

## 2019-05-31 RX ADMIN — GABAPENTIN 1200 MG: 400 CAPSULE ORAL at 18:26

## 2019-05-31 RX ADMIN — ENOXAPARIN SODIUM 40 MG: 40 INJECTION SUBCUTANEOUS at 23:48

## 2019-05-31 RX ADMIN — INSULIN LISPRO 2 UNITS: 100 INJECTION, SOLUTION INTRAVENOUS; SUBCUTANEOUS at 23:51

## 2019-05-31 RX ADMIN — METOPROLOL TARTRATE 75 MG: 50 TABLET ORAL at 23:47

## 2019-05-31 NOTE — OUTREACH NOTE
CHF Week 1 Survey      Responses   Facility patient discharged from?  [facility]   Does the patient have one of the following disease processes/diagnoses(primary or secondary)?  CHF   Is there a successful TCM telephone encounter documented?  No   CHF Week 1 attempt successful?  No   Revoke  Readmitted          Ladonna Hercules RN

## 2019-06-01 LAB
ALBUMIN SERPL-MCNC: 3 G/DL (ref 3.5–5.2)
ALBUMIN/GLOB SERPL: 0.9 G/DL
ALP SERPL-CCNC: 122 U/L (ref 39–117)
ALT SERPL W P-5'-P-CCNC: 20 U/L (ref 1–41)
ANION GAP SERPL CALCULATED.3IONS-SCNC: 12 MMOL/L
AST SERPL-CCNC: 24 U/L (ref 1–40)
BILIRUB SERPL-MCNC: 0.4 MG/DL (ref 0.2–1.2)
BUN BLD-MCNC: 18 MG/DL (ref 6–20)
BUN/CREAT SERPL: 23.1 (ref 7–25)
CALCIUM SPEC-SCNC: 8.7 MG/DL (ref 8.6–10.5)
CHLORIDE SERPL-SCNC: 101 MMOL/L (ref 98–107)
CO2 SERPL-SCNC: 24 MMOL/L (ref 22–29)
CREAT BLD-MCNC: 0.78 MG/DL (ref 0.76–1.27)
DEPRECATED RDW RBC AUTO: 46.6 FL (ref 37–54)
ERYTHROCYTE [DISTWIDTH] IN BLOOD BY AUTOMATED COUNT: 14.8 % (ref 12.3–15.4)
GFR SERPL CREATININE-BSD FRML MDRD: 105 ML/MIN/1.73
GLOBULIN UR ELPH-MCNC: 3.4 GM/DL
GLUCOSE BLD-MCNC: 174 MG/DL (ref 65–99)
GLUCOSE BLDC GLUCOMTR-MCNC: 157 MG/DL (ref 70–130)
GLUCOSE BLDC GLUCOMTR-MCNC: 163 MG/DL (ref 70–130)
GLUCOSE BLDC GLUCOMTR-MCNC: 166 MG/DL (ref 70–130)
GLUCOSE BLDC GLUCOMTR-MCNC: 167 MG/DL (ref 70–130)
GLUCOSE BLDC GLUCOMTR-MCNC: 220 MG/DL (ref 70–130)
HCT VFR BLD AUTO: 34.4 % (ref 37.5–51)
HGB BLD-MCNC: 11 G/DL (ref 13–17.7)
MCH RBC QN AUTO: 28.1 PG (ref 26.6–33)
MCHC RBC AUTO-ENTMCNC: 32 G/DL (ref 31.5–35.7)
MCV RBC AUTO: 87.8 FL (ref 79–97)
PLATELET # BLD AUTO: 247 10*3/MM3 (ref 140–450)
PMV BLD AUTO: 11.4 FL (ref 6–12)
POTASSIUM BLD-SCNC: 3.7 MMOL/L (ref 3.5–5.2)
PROT SERPL-MCNC: 6.4 G/DL (ref 6–8.5)
RBC # BLD AUTO: 3.92 10*6/MM3 (ref 4.14–5.8)
SODIUM BLD-SCNC: 137 MMOL/L (ref 136–145)
WBC NRBC COR # BLD: 9.44 10*3/MM3 (ref 3.4–10.8)

## 2019-06-01 PROCEDURE — 25010000002 PIPERACILLIN SOD-TAZOBACTAM PER 1 G: Performed by: INTERNAL MEDICINE

## 2019-06-01 PROCEDURE — 97161 PT EVAL LOW COMPLEX 20 MIN: CPT | Performed by: PHYSICAL THERAPIST

## 2019-06-01 PROCEDURE — 25010000002 ENOXAPARIN PER 10 MG: Performed by: NURSE PRACTITIONER

## 2019-06-01 PROCEDURE — 80053 COMPREHEN METABOLIC PANEL: CPT | Performed by: INTERNAL MEDICINE

## 2019-06-01 PROCEDURE — 85027 COMPLETE CBC AUTOMATED: CPT | Performed by: INTERNAL MEDICINE

## 2019-06-01 PROCEDURE — 82962 GLUCOSE BLOOD TEST: CPT

## 2019-06-01 PROCEDURE — 97605 NEG PRS WND THER DME<=50SQCM: CPT | Performed by: PHYSICAL THERAPIST

## 2019-06-01 PROCEDURE — 99232 SBSQ HOSP IP/OBS MODERATE 35: CPT | Performed by: HOSPITALIST

## 2019-06-01 RX ORDER — SPIRONOLACTONE 25 MG/1
25 TABLET ORAL DAILY
Status: DISCONTINUED | OUTPATIENT
Start: 2019-06-01 | End: 2019-06-02 | Stop reason: HOSPADM

## 2019-06-01 RX ORDER — FUROSEMIDE 40 MG/1
80 TABLET ORAL DAILY
Status: DISCONTINUED | OUTPATIENT
Start: 2019-06-01 | End: 2019-06-02 | Stop reason: HOSPADM

## 2019-06-01 RX ORDER — NYSTATIN 100000 [USP'U]/G
POWDER TOPICAL EVERY 12 HOURS SCHEDULED
Status: DISCONTINUED | OUTPATIENT
Start: 2019-06-01 | End: 2019-06-02 | Stop reason: HOSPADM

## 2019-06-01 RX ADMIN — DULOXETINE HYDROCHLORIDE 30 MG: 30 CAPSULE, DELAYED RELEASE ORAL at 09:41

## 2019-06-01 RX ADMIN — HYDROCODONE BITARTRATE AND ACETAMINOPHEN 1 TABLET: 10; 325 TABLET ORAL at 17:09

## 2019-06-01 RX ADMIN — NYSTATIN 1 APPLICATION: 100000 POWDER TOPICAL at 21:02

## 2019-06-01 RX ADMIN — SPIRONOLACTONE 25 MG: 25 TABLET, FILM COATED ORAL at 17:09

## 2019-06-01 RX ADMIN — SODIUM CHLORIDE, PRESERVATIVE FREE 3 ML: 5 INJECTION INTRAVENOUS at 09:40

## 2019-06-01 RX ADMIN — FUROSEMIDE 80 MG: 40 TABLET ORAL at 17:09

## 2019-06-01 RX ADMIN — LISINOPRIL 20 MG: 20 TABLET ORAL at 09:41

## 2019-06-01 RX ADMIN — TAMSULOSIN HYDROCHLORIDE 0.8 MG: 0.4 CAPSULE ORAL at 21:04

## 2019-06-01 RX ADMIN — TAZOBACTAM SODIUM AND PIPERACILLIN SODIUM 3.38 G: 375; 3 INJECTION, SOLUTION INTRAVENOUS at 17:10

## 2019-06-01 RX ADMIN — GABAPENTIN 1200 MG: 400 CAPSULE ORAL at 13:01

## 2019-06-01 RX ADMIN — TERAZOSIN HYDROCHLORIDE ANHYDROUS 8 MG: 5 CAPSULE ORAL at 21:03

## 2019-06-01 RX ADMIN — TAZOBACTAM SODIUM AND PIPERACILLIN SODIUM 3.38 G: 375; 3 INJECTION, SOLUTION INTRAVENOUS at 09:39

## 2019-06-01 RX ADMIN — GABAPENTIN 1200 MG: 400 CAPSULE ORAL at 06:52

## 2019-06-01 RX ADMIN — METOPROLOL TARTRATE 75 MG: 50 TABLET ORAL at 09:40

## 2019-06-01 RX ADMIN — INSULIN LISPRO 2 UNITS: 100 INJECTION, SOLUTION INTRAVENOUS; SUBCUTANEOUS at 13:01

## 2019-06-01 RX ADMIN — Medication 1 CAPSULE: at 09:41

## 2019-06-01 RX ADMIN — INSULIN LISPRO 4 UNITS: 100 INJECTION, SOLUTION INTRAVENOUS; SUBCUTANEOUS at 21:00

## 2019-06-01 RX ADMIN — SODIUM CHLORIDE, PRESERVATIVE FREE 3 ML: 5 INJECTION INTRAVENOUS at 20:59

## 2019-06-01 RX ADMIN — INSULIN LISPRO 2 UNITS: 100 INJECTION, SOLUTION INTRAVENOUS; SUBCUTANEOUS at 17:15

## 2019-06-01 RX ADMIN — METOPROLOL TARTRATE 75 MG: 50 TABLET ORAL at 21:04

## 2019-06-01 RX ADMIN — ENOXAPARIN SODIUM 40 MG: 40 INJECTION SUBCUTANEOUS at 21:04

## 2019-06-01 RX ADMIN — TAZOBACTAM SODIUM AND PIPERACILLIN SODIUM 3.38 G: 375; 3 INJECTION, SOLUTION INTRAVENOUS at 02:12

## 2019-06-01 RX ADMIN — GABAPENTIN 1200 MG: 400 CAPSULE ORAL at 21:04

## 2019-06-01 RX ADMIN — INSULIN LISPRO 2 UNITS: 100 INJECTION, SOLUTION INTRAVENOUS; SUBCUTANEOUS at 09:39

## 2019-06-01 NOTE — PLAN OF CARE
Problem: Patient Care Overview  Goal: Plan of Care Review  Outcome: Ongoing (interventions implemented as appropriate)   06/01/19 1720   Coping/Psychosocial   Plan of Care Reviewed With patient   Plan of Care Review   Progress improving   OTHER   Outcome Summary PT consult for Wound VAC; pt known from previous admission. VAC dressing changed and switched to hospital based unit from home unit. Wound bed with beefy wound edges and thin yellow slough centrally. Periwound skin with minor yeast rash; requested Nystatin for next VAC change to cont to keep yeast under control Used stoma powder and No sting for this dressing change. Cont NPWT to promote cont granulation formation / wound closure. Change VAC in 2-3 days (Mon/ Tues)

## 2019-06-01 NOTE — PROGRESS NOTES
"    [facility] Medicine Services  PROGRESS NOTE    Patient Name: Kendrick Crystal  : 1970  MRN: [id number]    Date of Admission: 2019  Length of Stay: 1  Primary Care Physician: Keyanna Leone APRN    Subjective   Subjective     CC:  Nausea, dry heaves    HPI:  No acute events overnight.  Patient is doing well, sitting up in bed and eating full liquid diet and tolerating it.  Patient stated that he does not like liquid diet and is requesting for regular diet.  He admits to \"cheating\".  He stated that he had his mom bring him tuna sandwich yesterday, which he aided and tolerated just fine.  He denies any other complaints.  He has had a bowel movement.  Afebrile.    Review of Systems  Gen- No fevers, + chills  CV- No chest pain, palpitations  Resp- No cough, dyspnea  GI- as above    Otherwise ROS is negative except as mentioned in the HPI.    Objective   Objective     Vital Signs:   Temp:  [98.5 °F (36.9 °C)-99.3 °F (37.4 °C)] 98.5 °F (36.9 °C)  Heart Rate:  [77-92] 78  Resp:  [20] 20  BP: (142-187)/(86-96) 152/96     Physical Exam:  Constitutional: No acute distress.  Patient is well-developed and well-nourished.  He is nontoxic appearing.  HENT: NCAT, mucous membranes moist  Respiratory: Clear to auscultation bilaterally, respiratory effort normal   Cardiovascular: RRR, no murmurs, rubs, or gallops, palpable pedal pulses on right, left BKA  Gastrointestinal: Positive bowel sounds, soft, nontender, nondistended, left inferior abdominal wall woundvac with no surrounding induration or warmth   Musculoskeletal: No right ankle edema  Psychiatric: flat affect, cooperative  Neurologic: Strength symmetric in all extremities, speech clear    Results Reviewed:  I have personally reviewed current lab, radiology, and data and agree.    Results from last 7 days   Lab Units 19  0557 19  0433 19  1331   WBC 10*3/mm3 9.44 11.07* 9.46   HEMOGLOBIN g/dL 11.0* 11.4* 12.7* "   HEMATOCRIT % 34.4* 34.7* 39.4   PLATELETS 10*3/mm3 247 240 231     Results from last 7 days   Lab Units 06/01/19  0557 05/31/19  1011 05/31/19  0433 05/30/19  2230  05/30/19  1331   SODIUM mmol/L 137  --  137  --   --  135*   POTASSIUM mmol/L 3.7  --  3.6  --   --  3.8   CHLORIDE mmol/L 101  --  100  --   --  97*   CO2 mmol/L 24.0  --  22.0  --   --  24.0   BUN mg/dL 18  --  17  --   --  11   CREATININE mg/dL 0.78  --  0.55*  --   --  0.47*   GLUCOSE mg/dL 174*  --  175*  --   --  195*   CALCIUM mg/dL 8.7  --  9.2  --   --  10.2   ALT (SGPT) U/L 20  --   --   --   --  18   AST (SGOT) U/L 24  --   --   --   --  19   TROPONIN T ng/mL  --  0.058* 0.054* 0.037*   < > 0.047*    < > = values in this interval not displayed.     Estimated Creatinine Clearance: 151.1 mL/min (by C-G formula based on SCr of 0.78 mg/dL).    Microbiology Results Abnormal     None          Imaging Results (last 24 hours)     ** No results found for the last 24 hours. **          Results for orders placed during the hospital encounter of 05/18/19   Adult Transthoracic Echo Complete W/ Cont if Necessary Per Protocol    Narrative · Estimated EF = 60%.  · Mild mitral valve regurgitation is present  · Mild tricuspid valve regurgitation is present.  · Calculated right ventricular systolic pressure from tricuspid   regurgitation is 29 mmHg.          I have reviewed the medications:    Current Facility-Administered Medications:   •  acetaminophen (TYLENOL) tablet 650 mg, 650 mg, Oral, Q4H PRN, Carol Galvan, APRN  •  bisacodyl (DULCOLAX) EC tablet 5 mg, 5 mg, Oral, Daily PRN, Carol Galvan, APRN  •  bisacodyl (DULCOLAX) suppository 10 mg, 10 mg, Rectal, Daily PRN, Carol Galvan, APRN, Stopped at 05/31/19 1827  •  dextrose (D50W) 25 g/ 50mL Intravenous Solution 25 g, 25 g, Intravenous, Q15 Min PRN, Carol Galvan, APRN  •  dextrose (GLUTOSE) oral gel 15 g, 15 g, Oral, Q15 Min PRN, Carol Galvan, APRN  •  DULoxetine  (CYMBALTA) DR capsule 30 mg, 30 mg, Oral, Daily, Carol Galvan, APRN, 30 mg at 06/01/19 0941  •  enoxaparin (LOVENOX) syringe 40 mg, 40 mg, Subcutaneous, Q24H, Carol Galvan, APRN, 40 mg at 05/31/19 2348  •  gabapentin (NEURONTIN) capsule 1,200 mg, 1,200 mg, Oral, Q8H, Jacqueline Porras MD, 1,200 mg at 06/01/19 1301  •  glucagon (human recombinant) (GLUCAGEN DIAGNOSTIC) injection 1 mg, 1 mg, Subcutaneous, PRN, Carol Galvan, APRN  •  hydrALAZINE (APRESOLINE) injection 10 mg, 10 mg, Intravenous, Q6H PRN, Carol Galvan, CHINTAN, 10 mg at 05/31/19 0848  •  HYDROcodone-acetaminophen (NORCO)  MG per tablet 1 tablet, 1 tablet, Oral, TID PRN, Jacqueline Porras MD  •  insulin lispro (humaLOG) injection 0-9 Units, 0-9 Units, Subcutaneous, 4x Daily With Meals & Nightly, Carol Galvan, APRN, 2 Units at 06/01/19 1301  •  lactobacillus acidophilus (RISAQUAD) capsule 1 capsule, 1 capsule, Oral, Daily, Carol Galvan, APRN, 1 capsule at 06/01/19 0941  •  lactulose enema, 300 mL, Rectal, Once, Ángel Landaverde MD, Stopped at 05/31/19 1600  •  lisinopril (PRINIVIL,ZESTRIL) tablet 20 mg, 20 mg, Oral, Q24H, Carol Galvan, APRN, 20 mg at 06/01/19 0941  •  metoprolol tartrate (LOPRESSOR) tablet 75 mg, 75 mg, Oral, Q12H, Jacqueline Porras MD, 75 mg at 06/01/19 0940  •  ondansetron (ZOFRAN) tablet 4 mg, 4 mg, Oral, Q6H PRN, 4 mg at 05/31/19 0849 **OR** ondansetron (ZOFRAN) injection 4 mg, 4 mg, Intravenous, Q6H PRN, Carol Galvan APRN, 4 mg at 05/31/19 0457  •  piperacillin-tazobactam (ZOSYN) 3.375 g in iso-osmotic dextrose 50 ml (premix), 3.375 g, Intravenous, Q8H, Usman Dong MD, 3.375 g at 06/01/19 0939  •  promethazine (PHENERGAN) injection 12.5 mg, 12.5 mg, Intravenous, Q6H PRN, Ángel Landaverde MD  •  sodium chloride 0.9 % flush 10 mL, 10 mL, Intravenous, PRN, Patrick Villanueva MD  •  sodium chloride 0.9 % flush 3 mL, 3 mL, Intravenous, Q12H, Carol Galvan, CHINTAN, 3 mL  at 06/01/19 0940  •  sodium chloride 0.9 % flush 3-10 mL, 3-10 mL, Intravenous, PRN, Carol Galvan D, APRN  •  tamsulosin (FLOMAX) 24 hr capsule 0.8 mg, 0.8 mg, Oral, Nightly, Carol Galvan, APRN, 0.8 mg at 05/31/19 2347      Assessment/Plan   Assessment / Plan     Active Hospital Problems    Diagnosis POA   • Intractable vomiting with nausea [R11.2] Yes   • Elevated troponin [R74.8] Yes   • Abdominal wall cellulitis [L03.311] Yes   • DM (diabetes mellitus) type II uncontrolled, periph vascular disorder (CMS/HCC) [E11.51, E11.65] Yes   • Essential hypertension [I10] Yes   • JOHNSTON Cirrhosis [K74.60] Yes     Brief Hospital Course to date:   Mr. Crystal is a 48-year-old male with a past medical history of Johnston cirrhosis, bipolar disorder, cellulitis of the right leg, hypertension, type 2 diabetes mellitus, hypertension, Lissa's gangrene, left leg wound infection, diabetic foot ulcers, and PAD with history of BKA who presented to the [facility] emergency department on 5/30 with intractable nausea and vomiting, onset 5/29 with inability to tolerate oral intake.  No diarrhea and patient has had a formed bowel movement prior to admission.  He was recently admitted from 5/18-5/29 for abdominal wall cellulitis associated with anasarca due to his cirrhosis.    Intractable nausea/dry heaves, due to presumed gastroenteritis vs. Medication from doxy  -CT abdomen/pelvis in ER negative without acute changes. He has stool in the rectal vault but overall stool burden is not considerable.  He has had bowel movement this morning without any medication/bowel regimen.  -Advance diet to regular ADA diet  - d/w Dr. Dong. Doxycycline discontinued. Pt changed to IV zosyn and improving.  Per Dr. Dong, may discharge patient home without any antibiotics if continues to improve by tomorrow.    Abdominal Wall Cellulitis with wound vac  - improving per Dr. Dong   - IV zosyn, PT wound care for change of wound  vac    DUNLAP Cirrhosis  - ammonia level within normal limits  - restart lasix/aldactone   - pt had anasarca last admission    Hypertensive urgency  -Still suboptimally controlled metoprolol, lisinopril, amlodipine   - restart diuretics and Hytrin today    Elevated troponin, overall trend is flat  - he has no active chest pain, echo reviewed from 5/19 (normal). I do not think this elevation is of any clinical significance.   - ECG with NSR, incomplete left bundle branch block (on my review stable from 4/2019 ECG)    Type 2 Diabetes Mellitus, complicated by neuropathy  - continue low dose SSI    Chronic Right Shin lesion  Chronic normocytic anemia  Bipolar Disorder  PAD with left BKA  BPH    DVT Prophylaxis:  SQ lovenox  Disposition: Home tomorrow if continues to improve and tolerate regular diet.    CODE STATUS:   Code Status and Medical Interventions:   Ordered at: 05/30/19 1806     Level Of Support Discussed With:    Patient     Code Status:    CPR     Medical Interventions (Level of Support Prior to Arrest):    Full         Electronically signed by Hesham Covarrubias MD, 06/01/19, 3:35 PM.

## 2019-06-01 NOTE — THERAPY WOUND CARE TREATMENT
Acute Care - Wound/Debridement Initial Evaluation  [facility]     Patient Name: Kendrick Crystal  : 1970  MRN: [id number]  Today's Date: 2019  Onset of Illness/Injury or Date of Surgery: 19  Date of Referral to PT: 19   Referring Physician: CHINTAN Patel       Admit Date: 2019    Visit Dx:    ICD-10-CM ICD-9-CM   1. Intractable vomiting with nausea, unspecified vomiting type R11.2 536.2   2. Uncontrolled hypertension I10 401.9       Patient Active Problem List   Diagnosis   • DUNLAP Cirrhosis   • Essential hypertension   • Non-compliance   • Hyperlipemia   • Hypertriglyceridemia, essential   • Wound infection of left leg   • Type 2 diabetes mellitus with circulatory disorder and uncontrolled   • Dysthymia   • History of MRSA infection   • Diabetic ulcer of right lower leg (CMS/HCC)   • Cellulitis of right anterior lower leg   • JUAN (acute kidney injury) (CMS/HCC)   • S/P BKA (below knee amputation), left (CMS/HCC)   • Peripheral vascular disease (CMS/HCC)   • DM (diabetes mellitus) type II uncontrolled, periph vascular disorder (CMS/HCC)   • Anasarca   • Abdominal wall cellulitis   • Obesity (BMI 30-39.9)   • Pleural effusion, bilateral   • Intractable vomiting with nausea   • Elevated troponin        Past Medical History:   Diagnosis Date   • JUAN (acute kidney injury) (CMS/HCC) 2018   • Arthritis    • Bipolar 1 disorder (CMS/HCC)    • Cellulitis of right anterior lower leg 2018   • Cirrhosis (CMS/HCC)    • Counseling for insulin pump    • Depression    • Diabetes mellitus (CMS/HCC)    • Encephalopathy, hepatic (CMS/HCC)    • H/O degenerative disc disease    • History of Lissa's gangrene    • Hyperlipemia    • Hypertension    • multiple Skin abscesses    • DUNLAP, bx showed stage IV fibrosis    • Neuropathy    • Peripheral vascular disease (CMS/HCC)    • Thrombophlebitis         Past Surgical History:   Procedure Laterality Date   • ABDOMINAL WALL ABSCESS INCISION AND  DRAINAGE N/A 4/26/2019    Procedure: ABDOMINAL WALL DEBRIDEMENT;  Surgeon: Fadi Kern MD;  Location:  [facility];  Service: General   • AMPUTATION DIGIT Left 1/30/2017    Procedure: left fourth and fifth transmetatarsal toe amputation ;  Surgeon: Juancho Martinez MD;  Location:  [facility];  Service:    • BELOW KNEE AMPUTATION Left 3/2/2017    Procedure: AMPUTATION BELOW KNEE, SHMUEL;  Surgeon: Juancho Martinez MD;  Location:  [facility];  Service:    • ENDOSCOPY     • LEG SURGERY     • TESTICLE SURGERY      DEBRIDEMENT FROM GANGRENE   • TONSILLECTOMY  1975           Wound 04/26/19 1000 Left lower;lateral abdomen incision (Active)   Dressing Appearance intact 6/1/2019  4:15 PM   Base moist;red;granulating;yellow;slough 6/1/2019  4:15 PM   Red (%), Wound Tissue Color 75 6/1/2019  4:15 PM   Yellow (%), Wound Tissue Color 25 6/1/2019  4:15 PM   Periwound intact;excoriated;pink;warm;blanchable 6/1/2019  4:15 PM   Periwound Temperature warm 6/1/2019  4:15 PM   Periwound Skin Turgor soft 6/1/2019  4:15 PM   Edges open 6/1/2019  4:15 PM   Wound Length (cm) 3 cm 6/1/2019  4:15 PM   Wound Width (cm) 7.8 cm 6/1/2019  4:15 PM   Wound Depth (cm) 2.5 cm 6/1/2019  4:15 PM   Tunneling [Depth (cm)/Location] 2.8 @ 3:00  6/1/2019  4:15 PM   Drainage Characteristics/Odor serosanguineous;serous;yellow 6/1/2019  4:15 PM   Drainage Amount small 6/1/2019  4:15 PM   Care, Wound irrigated with;wound cleanser;negative pressure wound therapy 6/1/2019  4:15 PM   Dressing Care, Wound dressing changed;foam;transparent film 6/1/2019  4:15 PM   Periwound Care, Wound cleansed with pH balanced cleanser;topical treatment applied;barrier film applied 6/1/2019  4:15 PM   Wound Output (mL) 125 6/1/2019  4:15 PM       Wound 05/30/19 2000 Right lower;anterior leg abrasion (Active)   Dressing Appearance open to air 5/31/2019  8:00 PM   Closure Open to air 6/1/2019  4:00 PM   Base dry;pink;scab 6/1/2019  4:00 PM   Periwound intact;dry;pink;warm 5/31/2019   8:00 PM   Periwound Temperature warm 5/31/2019  8:00 PM   Periwound Skin Turgor firm 5/31/2019  8:00 PM   Drainage Amount none 5/31/2019  8:00 PM   Dressing Care, Wound open to air 5/31/2019  8:00 PM   Periwound Care, Wound dry periwound area maintained 5/31/2019  8:00 PM       NPWT (Negative Pressure Wound Therapy) 05/21/19 0945 (Active)   Therapy Setting continuous therapy 6/1/2019  4:15 PM   Dressing foam, white;foam, black;transparent dressing 6/1/2019  4:15 PM   Pressure Setting 125 mmHg 6/1/2019  4:15 PM   Sponges Inserted 2 6/1/2019  4:15 PM   Sponges Removed 2 6/1/2019  4:15 PM   Finger sweep complete Yes 6/1/2019  4:15 PM         WOUND DEBRIDEMENT                        PT ASSESSMENT (last 12 hours)      Physical Therapy Evaluation     Row Name 06/01/19 1615          PT Evaluation Time/Intention    Subjective Information  complains of;fatigue  -MW     Document Type  wound care;evaluation;therapy note (daily note)  -MW     Mode of Treatment  physical therapy;individual therapy  -MW     Comment  family left room for dressing change  -MW     Row Name 06/01/19 6652          General Information    Patient Profile Reviewed?  yes  -MW     Onset of Illness/Injury or Date of Surgery  05/30/19  -MW     Referring Physician  CHINTAN Patel   -     Patient Observations  alert;cooperative;agree to therapy sleeping at end of dressing change   -MW     Patient/Family Observations  family left during dressing change; returned at end   -MW     General Observations of Patient  reclined in bed   -MW     Pertinent History of Current Functional Problem  Pt readmitted via ED due to N/V. VAC in place from home unit.   -MW     Risks Reviewed  patient:;increased discomfort  -MW     Benefits Reviewed  patient:;improve skin integrity;other (comment) promote wound healing   -MW     Barriers to Rehab  medically complex  -MW     Row Name 06/01/19 9560          Cognitive Assessment/Intervention- PT/OT    Orientation Status (Cognition)   oriented x 3  -MW     Follows Commands (Cognition)  WFL  -MW     Row Name 06/01/19 1615          Pain Assessment    Additional Documentation  Pain Scale: Numbers Pre/Post-Treatment (Group)  -MW     Row Name 06/01/19 1615          Pain Scale: Numbers Pre/Post-Treatment    Pain Scale: Numbers, Pretreatment  0/10 - no pain  -MW     Pain Scale: Numbers, Post-Treatment  0/10 - no pain  -MW     Pain Location - Side  Left  -MW     Pain Location - Orientation  incisional  -MW     Pre/Post Treatment Pain Comment  denies pain prior to wound care; sleeping after dressing placed   -MW     Row Name             Residual Limb Assessment 05/30/19 2000 transtibial (below knee), left    Residual Limb Assessment - Properties Group Date first assessed: 05/30/19  -BN Time first assessed: 2000  -BN Location: transtibial (below knee), left  -PT    Row Name             Wound 05/30/19 2000 Right lower;anterior leg abrasion    Wound - Properties Group Date first assessed: 05/30/19  -BN Time first assessed: 2000  -BN Present On Admission : yes  -BN Side: Right  -BN Orientation: lower;anterior  -BN Location: leg  -BN Type: abrasion  -BN    Row Name 06/01/19 1615          Wound 04/26/19 1000 Left lower;lateral abdomen incision    Wound - Properties Group Date first assessed: 04/26/19  -AB Time first assessed: 1000  -AB Side: Left  -TL Orientation: lower;lateral  -TL Location: abdomen  -TL Type: incision  -TL Stage, Pressure Injury: other (see comments)  -TL Additional Comments: I and D of abscess, tunneling noted  -TL    Dressing Appearance  intact home VAC unit in place; needs to be changed   -MW     Base  moist;red;granulating;yellow;slough  -MW     Red (%), Wound Tissue Color  75  -MW     Yellow (%), Wound Tissue Color  25  -MW     Periwound  intact;excoriated;pink;warm;blanchable faint residual yeast rash; much improved from last wk   -MW     Periwound Temperature  warm  -MW     Periwound Skin Turgor  soft  -MW     Edges  open  -MW      Wound Length (cm)  3 cm  -MW     Wound Width (cm)  7.8 cm  -MW     Wound Depth (cm)  2.5 cm  -MW     Tunneling [Depth (cm)/Location]  2.8 @ 3:00   -MW     Drainage Characteristics/Odor  serosanguineous;serous;yellow  -MW     Drainage Amount  small  -MW     Care, Wound  irrigated with;wound cleanser;negative pressure wound therapy  -MW     Dressing Care, Wound  dressing changed;foam;transparent film changed VAC foams; switched to hospital based unit  -MW     Periwound Care, Wound  cleansed with pH balanced cleanser;topical treatment applied;barrier film applied No sting, stoma powder; stoma paste   -MW     Wound Output (mL)  125 in home unit canister; 0 in new canister   -     Row Name 06/01/19 1615          NPWT (Negative Pressure Wound Therapy) 05/21/19 0945    NPWT (Negative Pressure Wound Therapy) - Properties Group Placement Date: 05/21/19  -MF Placement Time: 0945  -MF    Therapy Setting  continuous therapy  -MW     Dressing  foam, white;foam, black;transparent dressing  -MW     Pressure Setting  125 mmHg  -MW     Sponges Inserted  2 1 white, 1 black; + 1 black to bridge TRAC pad   -MW     Sponges Removed  2 1 white, 1 black   -MW     Finger sweep complete  Yes  -MW     Row Name 06/01/19 1615          Coping    Observed Emotional State  quiet;calm  -MW     Verbalized Emotional State  acceptance  -     Row Name 06/01/19 1615          Plan of Care Review    Plan of Care Reviewed With  patient;family  -     Row Name 06/01/19 1615          Physical Therapy Clinical Impression    Date of Referral to PT  05/30/19  -MW     PT Diagnosis (PT Clinical Impression)  impaired integument integrity   -MW     Patient/Family Goals Statement (PT Clinical Impression)  go home; heal wound   -MW     Criteria for Skilled Interventions Met (PT Clinical Impression)  yes;treatment indicated  -MW     Pathology/Pathophysiology Noted (Describe Specifically for Each System)  integumentary  -MW     Care Plan Review (PT)   evaluation/treatment results reviewed;care plan/treatment goals reviewed;patient/other agree to care plan  -     Row Name 06/01/19 1615          Physical Therapy Goals    Wound Care Goal Selection (PT)  wound care, PT goal 1  -     Additional Documentation  Wound Care Goal Selection (PT) (Row)  -     Row Name 06/01/19 1615          Wound Care Goal 1 (PT)    Wound Care Goal 1 (PT)  LT lower abdominal wound to decrease 10% in size.   -     Time Frame (Wound Care Goal 1, PT)  10 days  -     Row Name 06/01/19 1615          Positioning and Restraints    Pre-Treatment Position  in bed  -MW     Post Treatment Position  bed  -MW     In Bed  notified nsg;call light within reach;encouraged to call for assist;with family/caregiver  -       User Key  (r) = Recorded By, (t) = Taken By, (c) = Cosigned By    Initials Name Provider Type    Usman Johnson, PT Physical Therapist    Katrina Saravia, PT Physical Therapist    Gerri Valderrama RN Registered Nurse    Amy Arriaga LPN Licensed Nurse    Mariah Ervin RN Registered Nurse    POOJA PAIZ, Guillermo Otto RN Registered Nurse            Recommendation and Plan  Anticipated Discharge Disposition (PT): home with home health  Planned Therapy Interventions (PT Eval): wound care  Plan of Care Reviewed With: patient   Progress: improving   Outcome Summary/Treatment Plan (PT)  Anticipated Discharge Disposition (PT): home with home health   Progress: improving  Outcome Summary: PT consult for Wound VAC; pt known from previous admission. VAC dressing changed and switched to hospital based unit from home unit. Wound bed with beefy wound edges and thin yellow slough centrally. Periwound skin with minor yeast rash; requested Nystatin for next VAC change to cont to keep yeast under control  Used stoma powder and No sting for this dressing change. Cont NPWT to promote cont granulation formation / wound closure. Change VAC in 2-3 days (Mon/ Tues)   Plan of Care  Reviewed With: patient            Time Calculation  PT Charges     Row Name 06/01/19 1723             Time Calculation    Start Time  1615  -MW      PT Goal Re-Cert Due Date  06/11/19  -MW        User Key  (r) = Recorded By, (t) = Taken By, (c) = Cosigned By    Initials Name Provider Type    Katrina Saravia, PT Physical Therapist            Therapy Charges for Today     Code Description Service Date Service Provider Modifiers Qty    69313724648 HC PT EVAL LOW COMPLEXITY 3 6/1/2019 Katrina Vines, PT GP 1    63278008973 HC PT NEG PRESS WOUND TO 50SQCM DME3 6/1/2019 Katrina Vines, PT GP 1            PT G-Codes  AM-PAC 6 Clicks Score: 12       Katrina Vines PT  6/1/2019

## 2019-06-01 NOTE — PROGRESS NOTES
[facility] Progress Note        Antibiotics:  Anti-Infectives (From admission, onward)    Ordered     Dose/Rate Route Frequency Start Stop    19 1107  piperacillin-tazobactam (ZOSYN) 3.375 g in iso-osmotic dextrose 50 ml (premix)     Ordering Provider:  Usman Dong MD    3.375 g  over 4 Hours Intravenous Every 8 Hours 19 1800 19 1759    19 1107  piperacillin-tazobactam (ZOSYN) 3.375 g in iso-osmotic dextrose 50 ml (premix)     Ordering Provider:  Usman Dong MD    3.375 g  over 30 Minutes Intravenous Once 19 1200 19 1356    19 1557  doxycycline (MONODOX) capsule 100 mg     Ordering Provider:  Patrick Villanueva MD    100 mg Oral Once 19 1559 19 1620          CC: N/V    HPI:    Patient is a 48 y.o.  Yr old male with history of  poorly contolled T2DM, DUNLAP/liver cirrhosis, HTN, PVD/Left BKA, and multiple skin abscesses/MRSA who presented to [facility] ED with right shin wound infection summer 2018.  He was unable to get to see Dr. Martinez as his father passed away unexpectedly on 18 and he had to wait until after the .  The wound worsened becoming gangrenous and he came to [facility] ED in 2018.  He also had been hospitalized at [facility] and then transferred to  for a severe penis/scrotum infection requiring surgical debridement in summer 2018.  He received antibiotics regarding his right leg infection, generally improved over the remainder of summer 2018 but that area had not completely healed.     He was admitted 2019 with acute left lower abdominal wall redness/swelling and pain, spontaneous drainage associated with fever/chills and uncontrolled blood sugars.       I&D requiring wide debridement , severe/deep infection and transferred to [facility] on May 3 with IV Zosyn/oral doxycycline.  Cultures had lactobacillus and mixed gram-positive skin sherly including alpha strep, staph epidermidis and  corynebacterium     Readmitted to [facility] May 18, 2019, increasing generalized edema of his abdominal wall/ and lower extremities evolving over 1 week and further compounded by pulmonary edema by chest x-ray; he continued to improve with supportive measures and ongoing antibiotics for his abdominal wall infection.  Transitioned to oral doxycycline at discharge.     Readmitted May 30, 2019 with acute nausea/dry heaves and difficulty keeping an oral food/fluids.     6/1/19 feels some better;  No N/V and no hematochezia melena or hematemesis.  No diarrhea and denies abdominal pain.     No headache photophobia or neck stiffness.  No shortness of breath cough or hemoptysis.  No dysuria hematuria or pyuria.  No other new skin rash.  Redness has receded at the anterior abdominal wall and wound has had no new malodor or purulent drainage per him.      ROS:      6/1/19 No f/c/s. No n/v/d. No rash. No new ADR to Abx.       Constitutional-- No  chills or sweats.  Appetite diminished with generalized malaise  Heent-- No new vision, hearing or throat complaints.  No epistaxis or oral sores.  Denies odynophagia or dysphagia.  No flashers, floaters or eye pain. No odynophagia or dysphagia. No headache, photophobia or neck stiffness.  CV-- No chest pain, palpitation or syncope  Resp-- No SOB/cough/Hemoptysis  GI- No  diarrhea.  No hematochezia, melena, or hematemesis. Denies jaundice.  -- No dysuria, hematuria, or flank pain.  Denies hesitancy, urgency or flank pain.  Lymph- no swollen lymph nodes in neck/axilla or groin.   Heme- No active bruising or bleeding; no Hx of DVT or PE.  MS-- no swelling or pain in the bones or joints of arms/legs.  No new back pain.  Neuro-- No acute focal weakness or numbness in the arms or legs.  No seizures.     Full 12 point review of systems reviewed and negative otherwise for acute complaints, except for above           PE:   /86 (BP Location: Left arm, Patient Position:  "Sitting)   Pulse 77   Temp 98.5 °F (36.9 °C) (Oral)   Resp 20   Ht 190.5 cm (75\")   Wt 109 kg (240 lb)   SpO2 96%   BMI 30.00 kg/m²       GENERAL: Awake and alert, in no acute distress.   HEENT: Normocephalic, atraumatic.  PERRL. EOMI. No conjunctival injection. No icterus. Oropharynx clear without evidence of thrush or exudate. No evidence of peridontal disease.    NECK: Supple without nuchal rigidity. No mass.  LYMPH: No cervical, axillary or inguinal lymphadenopathy.  HEART: RRR; No murmur, rubs, gallops.   LUNGS: Clear to auscultation bilaterally without wheezing, rales, rhonchi. Normal respiratory effort. Nonlabored. No dullness.  ABDOMEN: Soft, nontender, nondistended. Positive bowel sounds. No rebound or guarding. NO mass or HSM.  EXT:  No cyanosis, clubbing or edema. No cord.  : Genitalia generally unremarkable.  Without Walker catheter.  MSK: FROM without joint effusions noted arms/legs.    SKIN: Warm and dry without cutaneous eruptions on Inspection/palpation.    NEURO: Oriented to PPT. No focal deficits on motor/sensory exam at arms/legs.  PSYCHIATRIC: Normal insight and judgement. Cooperative with PE        No peripheral stigmata/phenomena of endocarditis     Generalized edema/anasarca substantially better.  Abdominal wall with less/vague erythema surrounding his prior surgical site but no new mass bulge or fluctuance.  No crepitus or bulla.  No new malodor or purulent drainage;  Wound with slough at base but no new necrosis           Laboratory Data    Results from last 7 days   Lab Units 06/01/19  0557 05/31/19  0433 05/30/19  1331   WBC 10*3/mm3 9.44 11.07* 9.46   HEMOGLOBIN g/dL 11.0* 11.4* 12.7*   HEMATOCRIT % 34.4* 34.7* 39.4   PLATELETS 10*3/mm3 247 240 231     Results from last 7 days   Lab Units 06/01/19  0557   SODIUM mmol/L 137   POTASSIUM mmol/L 3.7   CHLORIDE mmol/L 101   CO2 mmol/L 24.0   BUN mg/dL 18   CREATININE mg/dL 0.78   GLUCOSE mg/dL 174*   CALCIUM mg/dL 8.7     Results from " last 7 days   Lab Units 06/01/19  0557   ALK PHOS U/L 122*   BILIRUBIN mg/dL 0.4   ALT (SGPT) U/L 20   AST (SGOT) U/L 24               Estimated Creatinine Clearance: 151.1 mL/min (by C-G formula based on SCr of 0.78 mg/dL).      Microbiology:      Radiology:  Imaging Results (last 72 hours)     Procedure Component Value Units Date/Time    CT Abdomen Pelvis Without Contrast [[id number]] Collected:  05/30/19 1456     Updated:  05/30/19 1546    Narrative:       EXAMINATION: CT ABDOMEN/PELVIS WO CONTRAST - 05/30/2019      INDICATION: Vomiting.      TECHNIQUE: CT scan of the abdomen and pelvis was performed without  contrast.     The radiation dose reduction device was turned on for each scan per the  ALARA (As Low as Reasonably Achievable) protocol.     COMPARISON: 05/19/2019     FINDINGS: There are bilateral pleural effusions. The liver and spleen  are normal. There is a benign-appearing left adrenal nodule which is  unchanged since 2015. There is fatty change in the right adrenal gland  which is also stable. The pancreas is normal. There is no renal mass,  stone or obstruction. There is no ascites, aneurysm or retroperitoneal  lymphadenopathy. There is no pelvic mass or fluid. There are some  borderline inguinal nodes which are smaller than on the previous  examination of 05/19/2019.       Impression:       There are no acute findings.     DICTATED:   05/30/2019  EDITED/ls :   05/30/2019         This report was finalized on 5/30/2019 3:43 PM by Dr. Guillermo Akbar MD.       XR Chest 1 View [[id number]] Collected:  05/30/19 1444     Updated:  05/30/19 1533    Narrative:       EXAMINATION: XR CHEST 1 VW- 05/30/2019      INDICATION: vomiting      COMPARISON: 05/23/2019     FINDINGS: Heart is at the upper limits of normal. There are chronic  postinflammatory changes at the left lung base. There is no acute  pulmonary process, mass or effusion.           Impression:       Stable chronic findings when compared with  05/23/2019.     D:  05/30/2019  E:  05/30/2019     This report was finalized on 5/30/2019 3:30 PM by Dr. Guillermo Akbar MD.               Impression:     --Acute nausea/dry heaves with generalized fatigue, low-grade temp and slight leukocytosis.  Abdominal CT without obvious acute inflammatory process.  Unclear if possible adverse reaction to doxycycline versus viral gastroenteritis versus other. Further decisions regarding antimicrobial to depend on clinical course/study results and response to therapy.  Continue general supportive measures     --Anasarca better.   No anaphylaxis/rash or angioedema.  No erythroderma       --Acute anterior abdominal wall, left lower quadrant cellulitis/abscess s/p I&D 4/26 requiring wide debridement, severe and deep process;  This is further complicated by uncontrolled diabetes and recent anasarca.  s/p Broad antimicrobials  with daptomycin/Zosyn and tapered to doxy 5/28; unclear if possible doxycycline intolerance and therefore stopped May 31.  Making improvements and IV Zosyn ongoing; if no new separative sequela and continued improvements then may be able to stop antibiotics altogether soon.      --Diabetes mellitus type 2, uncontrolled.  You need to tightly control blood sugar to give best chance for healing     --History right lower leg infection.  Chronic nonhealed area anterior shin with ongoing skin care.  No obvious secondary infection at present.  Monitor.  Any specific timing/option or threshold for further vascular work-up per vascular team at their discretion     --History MRSA     --History DUNLAP and chronic liver disease per past notes     --Prior left BKA     --Peripheral vascular disease.  Chronic and current extent/severity unclear and would need to be clarified by vascular team at their discretion     --pleural effusion;  Cx negative so far           PLAN:       --IV zosyn     --Doxycycline stopped      --Check/review labs cultures and scans     --Highly complex set  of issues with high risk for further serious morbidity and other serious sequela     --Discussed with microbiology and history per nursing staff     --History per nursing staff and family     --d/w Dr Saima Dong MD  6/1/2019

## 2019-06-01 NOTE — PLAN OF CARE
Problem: Fall Risk (Adult)  Goal: Identify Related Risk Factors and Signs and Symptoms  Outcome: Ongoing (interventions implemented as appropriate)   06/01/19 1626   Fall Risk (Adult)   Related Risk Factors (Fall Risk) fatigue/slow reaction;fear of falling;gait/mobility problems;history of falls;slippery/uneven surfaces;environment unfamiliar   Signs and Symptoms (Fall Risk) presence of risk factors       Problem: Patient Care Overview  Goal: Plan of Care Review  Outcome: Ongoing (interventions implemented as appropriate)   06/01/19 1626   Coping/Psychosocial   Plan of Care Reviewed With patient;significant other   Plan of Care Review   Progress no change   OTHER   Outcome Summary Pt. is resting in bed with no complaints of pain or discomfort. Had two large BM's today with no more complaints of nausea. Wound vac replaced. VSS. Will continue to monitor

## 2019-06-02 VITALS
WEIGHT: 240 LBS | HEIGHT: 75 IN | BODY MASS INDEX: 29.84 KG/M2 | OXYGEN SATURATION: 96 % | DIASTOLIC BLOOD PRESSURE: 99 MMHG | TEMPERATURE: 97.9 F | SYSTOLIC BLOOD PRESSURE: 169 MMHG | RESPIRATION RATE: 18 BRPM | HEART RATE: 76 BPM

## 2019-06-02 LAB
ANION GAP SERPL CALCULATED.3IONS-SCNC: 11 MMOL/L
BUN BLD-MCNC: 22 MG/DL (ref 6–20)
BUN/CREAT SERPL: 25 (ref 7–25)
CALCIUM SPEC-SCNC: 8.2 MG/DL (ref 8.6–10.5)
CHLORIDE SERPL-SCNC: 101 MMOL/L (ref 98–107)
CO2 SERPL-SCNC: 27 MMOL/L (ref 22–29)
CREAT BLD-MCNC: 0.88 MG/DL (ref 0.76–1.27)
GFR SERPL CREATININE-BSD FRML MDRD: 92 ML/MIN/1.73
GLUCOSE BLD-MCNC: 299 MG/DL (ref 65–99)
GLUCOSE BLDC GLUCOMTR-MCNC: 210 MG/DL (ref 70–130)
GLUCOSE BLDC GLUCOMTR-MCNC: 332 MG/DL (ref 70–130)
POTASSIUM BLD-SCNC: 3.7 MMOL/L (ref 3.5–5.2)
SODIUM BLD-SCNC: 139 MMOL/L (ref 136–145)

## 2019-06-02 PROCEDURE — 82962 GLUCOSE BLOOD TEST: CPT

## 2019-06-02 PROCEDURE — 99239 HOSP IP/OBS DSCHRG MGMT >30: CPT | Performed by: HOSPITALIST

## 2019-06-02 PROCEDURE — 80048 BASIC METABOLIC PNL TOTAL CA: CPT | Performed by: HOSPITALIST

## 2019-06-02 PROCEDURE — 97605 NEG PRS WND THER DME<=50SQCM: CPT | Performed by: PHYSICAL THERAPIST

## 2019-06-02 PROCEDURE — 25010000002 PIPERACILLIN SOD-TAZOBACTAM PER 1 G: Performed by: INTERNAL MEDICINE

## 2019-06-02 RX ORDER — NYSTATIN 100000 [USP'U]/G
POWDER TOPICAL EVERY 12 HOURS SCHEDULED
Qty: 15 G | Refills: 0 | Status: SHIPPED | OUTPATIENT
Start: 2019-06-02 | End: 2019-06-11

## 2019-06-02 RX ORDER — LANCETS 28 GAUGE
EACH MISCELLANEOUS
Qty: 100 EACH | Refills: 0 | Status: SHIPPED | OUTPATIENT
Start: 2019-06-02 | End: 2019-07-30

## 2019-06-02 RX ORDER — SYRINGE, DISPOSABLE, 3 ML
1 SYRINGE, EMPTY DISPOSABLE MISCELLANEOUS 4 TIMES DAILY
Qty: 100 EACH | Refills: 0 | Status: SHIPPED | OUTPATIENT
Start: 2019-06-02 | End: 2019-07-30

## 2019-06-02 RX ORDER — BLOOD-GLUCOSE METER
1 KIT MISCELLANEOUS AS NEEDED
Qty: 1 EACH | Refills: 0 | Status: SHIPPED | OUTPATIENT
Start: 2019-06-02 | End: 2019-07-30

## 2019-06-02 RX ORDER — INSULIN GLARGINE 100 [IU]/ML
30 INJECTION, SOLUTION SUBCUTANEOUS 2 TIMES DAILY
Qty: 10 ML | Refills: 0 | Status: SHIPPED | OUTPATIENT
Start: 2019-06-02 | End: 2019-06-11 | Stop reason: SDUPTHER

## 2019-06-02 RX ADMIN — INSULIN LISPRO 4 UNITS: 100 INJECTION, SOLUTION INTRAVENOUS; SUBCUTANEOUS at 12:56

## 2019-06-02 RX ADMIN — METOPROLOL TARTRATE 75 MG: 50 TABLET ORAL at 08:06

## 2019-06-02 RX ADMIN — FUROSEMIDE 80 MG: 40 TABLET ORAL at 08:06

## 2019-06-02 RX ADMIN — SPIRONOLACTONE 25 MG: 25 TABLET, FILM COATED ORAL at 08:06

## 2019-06-02 RX ADMIN — GABAPENTIN 1200 MG: 400 CAPSULE ORAL at 12:56

## 2019-06-02 RX ADMIN — Medication 1 CAPSULE: at 08:06

## 2019-06-02 RX ADMIN — INSULIN LISPRO 7 UNITS: 100 INJECTION, SOLUTION INTRAVENOUS; SUBCUTANEOUS at 08:06

## 2019-06-02 RX ADMIN — GABAPENTIN 1200 MG: 400 CAPSULE ORAL at 05:43

## 2019-06-02 RX ADMIN — DULOXETINE HYDROCHLORIDE 30 MG: 30 CAPSULE, DELAYED RELEASE ORAL at 08:06

## 2019-06-02 RX ADMIN — TAZOBACTAM SODIUM AND PIPERACILLIN SODIUM 3.38 G: 375; 3 INJECTION, SOLUTION INTRAVENOUS at 08:06

## 2019-06-02 RX ADMIN — TAZOBACTAM SODIUM AND PIPERACILLIN SODIUM 3.38 G: 375; 3 INJECTION, SOLUTION INTRAVENOUS at 01:46

## 2019-06-02 RX ADMIN — LISINOPRIL 20 MG: 20 TABLET ORAL at 08:06

## 2019-06-02 NOTE — DISCHARGE SUMMARY
[facility]  DISCHARGE SUMMARY    Patient Name: Kendrick Crystal  : 1970  MRN: [id number]    Date of Admission: 2019  Date of Discharge: 2019  Primary Care Physician: Keyanna Leone APRN    Consults     Date and Time Order Name Status Description    2019 0030 Inpatient Infectious Diseases Consult Completed     2019 1312 Inpatient Infectious Diseases Consult Completed     2019 0030 Inpatient General Surgery Consult Completed     2019 1930 Inpatient Urology Consult Completed     2019 0838 Inpatient General Surgery Consult Completed     2019 0709 Inpatient Infectious Diseases Consult Completed           Hospital Course     Presenting Problem:   Intractable vomiting with nausea, unspecified vomiting type [R11.2]  Intractable vomiting with nausea, unspecified vomiting type [R11.2]    Active Hospital Problems    Diagnosis  POA   • Intractable vomiting with nausea [R11.2]  Yes   • Elevated troponin [R74.8]  Yes   • Abdominal wall cellulitis [L03.311]  Yes   • DM (diabetes mellitus) type II uncontrolled, periph vascular disorder (CMS/HCC) [E11.51, E11.65]  Yes   • Essential hypertension [I10]  Yes   • DUNLAP Cirrhosis [K74.60]  Yes      Resolved Hospital Problems   No resolved problems to display.          Hospital Course:  Mr. Crystal is a 48-year-old male with a past medical history of [place] cirrhosis, bipolar disorder, cellulitis of the right leg, hypertension, type 2 diabetes mellitus, hypertension, Lissa's gangrene, left leg wound infection, diabetic foot ulcers, and PAD with history of BKA who presented to the [facility] emergency department on  with intractable nausea and vomiting, onset  with inability to tolerate oral intake, which was thought to be secondary to doxycycline.  No diarrhea and patient has had a formed bowel movement prior to admission.  He was recently admitted from - for  abdominal wall cellulitis associated with anasarca due to his cirrhosis.    Patient was admitted and doxycycline was discontinued.  Nausea/dry heaves stopped and patient was able to tolerate regular diet.  Patient was seen by Dr. Dong, who continue patient on IV Zosyn while he was hospitalized, however at discharge he recommended discontinuing all antibiotics entirely.  Patient has been nontoxic and afebrile.  Wound is healing with good granulation tissues noted.  No signs of any acute infection at the wound.  Patient will follow-up with Dr. Paige and PCP after he is discharged.  Of note, patient has run out of his diabetes medication and testing supplies, he was seen by diabetic educator who recommended freestyle meter and testing strips; new glucometer, testing strips, insulin, lancets, and all other necessary equipments that is needed for glucose monitoring were prescribed to patient.  He was instructed to follow-up with his PCP for renewal.      Discharge Follow Up Recommendations for labs/diagnostics:      Day of Discharge     HPI:   Doing well, no acute events reported from overnight.  Patient is sitting up in bed and tolerating regular diet.  Denies any new complaints.  He is afebrile.    Review of Systems  Otherwise ROS is negative except as mentioned in the HPI.    Vital Signs:   Temp:  [97.9 °F (36.6 °C)-98.2 °F (36.8 °C)] 97.9 °F (36.6 °C)  Heart Rate:  [76-81] 76  Resp:  [18-20] 18  BP: (140-170)/() 169/99     Physical Exam:  Constitutional: No acute distress.  Patient is well-developed and well-nourished.  He is nontoxic appearing.  HENT: NCAT, mucous membranes moist  Respiratory: Clear to auscultation bilaterally, respiratory effort normal   Cardiovascular: RRR, no murmurs, rubs, or gallops, palpable pedal pulses on right, left BKA  Gastrointestinal: Positive bowel sounds, soft, nontender, nondistended, left inferior abdominal wall woundvac with no surrounding induration or warmth; good  granulation tissues noted on picture taken by wound care team.  Musculoskeletal: No right ankle edema  Psychiatric: flat affect, cooperative  Neurologic: Strength symmetric in all extremities, speech clear          Pertinent  and/or Most Recent Results     Results from last 7 days   Lab Units 06/02/19  0546 06/01/19  0557 05/31/19  0433 05/30/19  1331   WBC 10*3/mm3  --  9.44 11.07* 9.46   HEMOGLOBIN g/dL  --  11.0* 11.4* 12.7*   HEMATOCRIT %  --  34.4* 34.7* 39.4   PLATELETS 10*3/mm3  --  247 240 231   SODIUM mmol/L 139 137 137 135*   POTASSIUM mmol/L 3.7 3.7 3.6 3.8   CHLORIDE mmol/L 101 101 100 97*   CO2 mmol/L 27.0 24.0 22.0 24.0   BUN mg/dL 22* 18 17 11   CREATININE mg/dL 0.88 0.78 0.55* 0.47*   GLUCOSE mg/dL 299* 174* 175* 195*   CALCIUM mg/dL 8.2* 8.7 9.2 10.2     Results from last 7 days   Lab Units 06/01/19  0557 05/30/19  1331   BILIRUBIN mg/dL 0.4 0.6   ALK PHOS U/L 122* 157*   ALT (SGPT) U/L 20 18   AST (SGOT) U/L 24 19           Invalid input(s): TG, LDLCALC, LDLREALC  Results from last 7 days   Lab Units 05/31/19  1011 05/31/19  0433 05/30/19  2230 05/30/19  1624 05/30/19  1331   TROPONIN T ng/mL 0.058* 0.054* 0.037* 0.058* 0.047*       Brief Urine Lab Results  (Last result in the past 365 days)      Color   Clarity   Blood   Leuk Est   Nitrite   Protein   CREAT   Urine HCG        05/30/19 1408 Yellow Clear Negative Negative Negative 100 mg/dL (2+)               Microbiology Results Abnormal     None          Imaging Results (all)     Procedure Component Value Units Date/Time    CT Abdomen Pelvis Without Contrast [[id number]] Collected:  05/30/19 1456     Updated:  05/30/19 1546    Narrative:       EXAMINATION: CT ABDOMEN/PELVIS WO CONTRAST - 05/30/2019      INDICATION: Vomiting.      TECHNIQUE: CT scan of the abdomen and pelvis was performed without  contrast.     The radiation dose reduction device was turned on for each scan per the  ALARA (As Low as Reasonably Achievable) protocol.     COMPARISON:  05/19/2019     FINDINGS: There are bilateral pleural effusions. The liver and spleen  are normal. There is a benign-appearing left adrenal nodule which is  unchanged since 2015. There is fatty change in the right adrenal gland  which is also stable. The pancreas is normal. There is no renal mass,  stone or obstruction. There is no ascites, aneurysm or retroperitoneal  lymphadenopathy. There is no pelvic mass or fluid. There are some  borderline inguinal nodes which are smaller than on the previous  examination of 05/19/2019.       Impression:       There are no acute findings.     DICTATED:   05/30/2019  EDITED/ls :   05/30/2019         This report was finalized on 5/30/2019 3:43 PM by Dr. Guillermo Akbar MD.       XR Chest 1 View [[id number]] Collected:  05/30/19 1444     Updated:  05/30/19 1533    Narrative:       EXAMINATION: XR CHEST 1 VW- 05/30/2019      INDICATION: vomiting      COMPARISON: 05/23/2019     FINDINGS: Heart is at the upper limits of normal. There are chronic  postinflammatory changes at the left lung base. There is no acute  pulmonary process, mass or effusion.           Impression:       Stable chronic findings when compared with 05/23/2019.     D:  05/30/2019  E:  05/30/2019     This report was finalized on 5/30/2019 3:30 PM by Dr. Guillermo Akbar MD.             Results for orders placed during the hospital encounter of 01/25/17   Doppler Arterial Multi Level Lower Extremity - Bilateral CAR    Narrative · Right Conclusion: The right CARLI is normal.  · Left Conclusion: The left CARLI could not be calculated due to   noncompressible vessels, most probably due to calcifications. Waveforms   are suggestive of disease involving the tibioperoneal arteries.          Results for orders placed during the hospital encounter of 01/25/17   Doppler Arterial Multi Level Lower Extremity - Bilateral CAR    Narrative · Right Conclusion: The right CARLI is normal.  · Left Conclusion: The left CARLI could not be calculated  "due to   noncompressible vessels, most probably due to calcifications. Waveforms   are suggestive of disease involving the tibioperoneal arteries.          Results for orders placed during the hospital encounter of 05/18/19   Adult Transthoracic Echo Complete W/ Cont if Necessary Per Protocol    Narrative · Estimated EF = 60%.  · Mild mitral valve regurgitation is present  · Mild tricuspid valve regurgitation is present.  · Calculated right ventricular systolic pressure from tricuspid   regurgitation is 29 mmHg.            Discharge Details        Discharge Medications      New Medications      Instructions Start Date   freestyle lancets   Testing qAC/HS      glucose blood test strip   Use as instructed      glucose monitoring kit monitoring kit   1 each, Does not apply, As Needed      insulin glargine 100 UNIT/ML injection  Commonly known as:  LANTUS  Replaces:  insulin detemir 100 UNIT/ML injection   30 Units, Subcutaneous, 2 Times Daily      Needle (Disp) 20G X 1\" misc  Commonly known as:  BD DISP NEEDLES   1 each, Does not apply, 4 Times Daily      Needle (Disp) 30G X 1/2\" misc  Commonly known as:  BD DISP NEEDLES   1 each, Does not apply, 4 Times Daily      nystatin 095516 UNIT/GM powder  Commonly known as:  MYCOSTATIN   Topical, Every 12 Hours Scheduled      Syringe 2-3 ML 3 ML misc   1 each, Does not apply, 4 Times Daily         Continue These Medications      Instructions Start Date   acetaminophen 325 MG tablet  Commonly known as:  TYLENOL   650 mg, Oral, Every 4 Hours PRN      DULoxetine 30 MG capsule  Commonly known as:  CYMBALTA   30 mg, Oral, Daily      enoxaparin 40 MG/0.4ML solution syringe  Commonly known as:  LOVENOX   40 mg, Subcutaneous, Every 12 Hours Scheduled      furosemide 80 MG tablet  Commonly known as:  LASIX   80 mg, Oral, Daily      gabapentin 400 MG capsule  Commonly known as:  NEURONTIN   1,200 mg, Oral, 3 Times Daily      HYDROcodone-acetaminophen  MG per tablet  Commonly known " as:  NORCO   1 tablet, Oral, 3 Times Daily PRN      insulin lispro 100 UNIT/ML injection  Commonly known as:  humaLOG   0-14 Units, Subcutaneous, 4 Times Daily With Meals & Nightly      lactobacillus acidophilus capsule capsule   1 capsule, Oral, Daily      lisinopril 20 MG tablet  Commonly known as:  PRINIVIL,ZESTRIL   20 mg, Oral, Every 24 Hours Scheduled      melatonin 5 MG tablet tablet   5 mg, Oral, Nightly      metFORMIN 1000 MG tablet  Commonly known as:  GLUCOPHAGE   1,000 mg, Oral, 2 Times Daily With Meals      Metoprolol Tartrate 75 MG tablet   75 mg, Oral, Every 12 Hours Scheduled      senna 8.6 MG tablet tablet  Commonly known as:  SENOKOT   1 tablet, Oral, Nightly      spironolactone 25 MG tablet  Commonly known as:  ALDACTONE   25 mg, Oral, Daily      tamsulosin 0.4 MG capsule 24 hr capsule  Commonly known as:  FLOMAX   0.8 mg, Oral, Nightly      terazosin 2 MG capsule  Commonly known as:  HYTRIN   8 mg, Oral, Nightly         Stop These Medications    doxycycline 100 MG capsule  Commonly known as:  MONODOX     insulin detemir 100 UNIT/ML injection  Commonly known as:  LEVEMIR  Replaced by:  insulin glargine 100 UNIT/ML injection            Allergies   Allergen Reactions   • No Known Drug Allergy          Discharge Disposition:  Home or Self Care    Discharge Diet:  Diet Order   Procedures   • Diet Regular; Consistent Carbohydrate         Discharge Activity:         CODE STATUS:    Code Status and Medical Interventions:   Ordered at: 05/30/19 1806     Level Of Support Discussed With:    Patient     Code Status:    CPR     Medical Interventions (Level of Support Prior to Arrest):    Full         Future Appointments   Date Time Provider Department Center   6/4/2019  3:45 PM Garcia Lenz MD [facility] None   6/6/2019  1:00 PM Keyanna eLone APRN [facility] None       Additional Instructions for the Follow-ups that You Need to Schedule     Discharge Follow-up with PCP   As directed        Currently Documented PCP:    Keyanna Leone APRN    PCP Phone Number:    [phone]     Follow Up Details:  Pt to call for appnt to be seen in 1-2 weeks         Discharge Follow-up with Specialty: Belkys Dong   As directed      Specialty:  Belkys Dong    Follow Up Details:  Pt to call for appnt               Time Spent on Discharge: 50 minutes    Electronically signed by Hesham Covarrubias MD, 06/02/19, 2:04 PM.

## 2019-06-02 NOTE — THERAPY WOUND CARE TREATMENT
Acute Care - Wound/Debridement Treatment Note  [facility]     Patient Name: Kendrick Crystal  : 1970  MRN: [id number]  Today's Date: 2019  Onset of Illness/Injury or Date of Surgery: 19   Date of Referral to PT: 19   Referring Physician: CHINTAN Patel        Admit Date: 2019    Visit Dx:    ICD-10-CM ICD-9-CM   1. Intractable vomiting with nausea, unspecified vomiting type R11.2 536.2   2. Uncontrolled hypertension I10 401.9       Patient Active Problem List   Diagnosis   • DUNLAP Cirrhosis   • Essential hypertension   • Non-compliance   • Hyperlipemia   • Hypertriglyceridemia, essential   • Wound infection of left leg   • Type 2 diabetes mellitus with circulatory disorder and uncontrolled   • Dysthymia   • History of MRSA infection   • Diabetic ulcer of right lower leg (CMS/[facility])   • Cellulitis of right anterior lower leg   • JUAN (acute kidney injury) (CMS/[facility])   • S/P BKA (below knee amputation), left (CMS/[facility])   • Peripheral vascular disease (CMS/[facility])   • DM (diabetes mellitus) type II uncontrolled, periph vascular disorder (CMS/[facility])   • Anasarca   • Abdominal wall cellulitis   • Obesity (BMI 30-39.9)   • Pleural effusion, bilateral   • Intractable vomiting with nausea   • Elevated troponin         LDA Wound     Row Name 19 1615             Wound 19 Right lower;anterior leg abrasion    Wound - Properties Group Date first assessed: 19  -BN Time first assessed:   -BN Present On Admission : yes  -BN Side: Right  -BN Orientation: lower;anterior  -BN Location: leg  -BN Type: abrasion  -BN       Wound 19 1000 Left lower;lateral abdomen incision    Wound - Properties Group Date first assessed: 19  -AB Time first assessed: 1000  -AB Side: Left  -TL Orientation: lower;lateral  -TL Location: abdomen  -TL Type: incision  -TL Stage, Pressure Injury: other (see comments)  -TL Additional Comments: I and D of abscess, tunneling noted  -TL    Wound Image   Images linked: 1  -         NPWT (Negative Pressure Wound Therapy) 05/21/19 0945    NPWT (Negative Pressure Wound Therapy) - Properties Group Placement Date: 05/21/19  - Placement Time: 0945  -      User Key  (r) = Recorded By, (t) = Taken By, (c) = Cosigned By    Initials Name Provider Type    Usman Johnson, PT Physical Therapist    Katrina Saravia, CANDY Physical Therapist    Gerri Valderrama RN Registered Nurse    BN Mariah Flores RN Registered Nurse    POOJA L, Guillermo Otto RN Registered Nurse        Wound 04/26/19 1000 Left lower;lateral abdomen incision (Active)   Wound Image   6/1/2019  4:15 PM   Dressing Appearance intact;dry 6/2/2019 10:00 AM   Closure KALYN 6/2/2019  8:00 AM   Base moist;red;granulating;yellow;slough 6/1/2019  4:15 PM   Red (%), Wound Tissue Color 75 6/1/2019  4:15 PM   Yellow (%), Wound Tissue Color 25 6/1/2019  4:15 PM   Periwound intact;excoriated;pink;warm;blanchable 6/1/2019  4:15 PM   Periwound Temperature warm 6/1/2019  4:15 PM   Periwound Skin Turgor soft 6/1/2019  4:15 PM   Edges open 6/1/2019  4:15 PM   Wound Length (cm) 3 cm 6/1/2019  4:15 PM   Wound Width (cm) 7.8 cm 6/1/2019  4:15 PM   Wound Depth (cm) 2.5 cm 6/1/2019  4:15 PM   Tunneling [Depth (cm)/Location] 2.8 @ 3:00  6/1/2019  4:15 PM   Drainage Characteristics/Odor serosanguineous;serous;yellow 6/2/2019  9:25 AM   Drainage Amount scant 6/2/2019  9:25 AM   Care, Wound irrigated with;wound cleanser;negative pressure wound therapy 6/1/2019  4:15 PM   Dressing Care, Wound dressing changed;foam;transparent film 6/1/2019  4:15 PM   Periwound Care, Wound cleansed with pH balanced cleanser;topical treatment applied;barrier film applied 6/1/2019  4:15 PM   Wound Output (mL) 25 6/2/2019  9:25 AM       Wound 05/30/19 2000 Right lower;anterior leg abrasion (Active)   Dressing Appearance open to air 6/1/2019  8:00 PM   Closure Open to air 6/2/2019 10:00 AM   Base dry;pink;scab 6/2/2019 10:00 AM   Periwound  intact;dry;pink;warm 6/1/2019  8:00 PM   Periwound Temperature warm 6/1/2019  8:00 PM   Periwound Skin Turgor firm 6/1/2019  8:00 PM   Drainage Amount none 6/1/2019  8:00 PM   Dressing Care, Wound open to air 6/1/2019  8:00 PM   Periwound Care, Wound dry periwound area maintained 6/1/2019  8:00 PM       NPWT (Negative Pressure Wound Therapy) 05/21/19 0945 (Active)   Therapy Setting continuous therapy 6/2/2019 10:00 AM   Dressing foam, black;foam, white 6/2/2019 10:00 AM   Pressure Setting 125 mmHg 6/2/2019 10:00 AM   Sponges Inserted 2 6/1/2019  4:15 PM   Sponges Removed 2 6/1/2019  4:15 PM   Finger sweep complete Yes 6/1/2019  4:15 PM         WOUND DEBRIDEMENT                  Therapy Treatment    Rehabilitation Treatment Summary     Row Name 06/02/19 3268             Treatment Time/Intention    Discipline  physical therapist  -MW      Document Type  wound care;therapy note (daily note) VAC ck   -MW      Mode of Treatment  physical therapy;individual therapy  -MW      Patient/Family Observations  pt sleeping in bed; family present. Report no issues wiht VAC over night; no plan yet to go home   -MW      Care Plan Review  care plan/treatment goals reviewed;patient/other agree to care plan  -MW      Care Plan Review, Other Participant(s)  family  -MW      Recorded by [MW] Katrina Vines, PT 06/02/19 4677      Row Name 06/02/19 6386             Cognitive Assessment/Intervention- PT/OT    Orientation Status (Cognition)  oriented x 3  -MW      Follows Commands (Cognition)  WFL  -MW      Recorded by [MW] Katrina Vines, PT 06/02/19 6235      Row Name 06/02/19 3647             Positioning and Restraints    Pre-Treatment Position  in bed  -MW      Post Treatment Position  bed  -MW      In Bed  with family/caregiver  -MW      Recorded by [MW] Katrina Vines, PT 06/02/19 9982      Row Name 06/02/19 9472             Pain Scale: Numbers Pre/Post-Treatment    Pain Location - Side  Left  -MW      Pain Location -  Orientation  incisional  -MW      Pre/Post Treatment Pain Comment  not assessed pt sleeping   -MW      Recorded by [MW] Katrina Vines, PT 06/02/19 1308      Row Name                Residual Limb Assessment 05/30/19 2000 transtibial (below knee), left    Residual Limb Assessment - Properties Group Date first assessed: 05/30/19 [BN] Time first assessed: 2000 [BN] Location: transtibial (below knee), left [PT] Recorded by:  [BN] Mariah Florse RN 05/31/19 0138 [PT] Amy Villanueva LPN 05/20/19 0102    Row Name                Wound 05/30/19 2000 Right lower;anterior leg abrasion    Wound - Properties Group Date first assessed: 05/30/19 [BN] Time first assessed: 2000 [BN] Present On Admission : yes [BN] Side: Right [BN] Orientation: lower;anterior [BN] Location: leg [BN] Type: abrasion [BN] Recorded by:  [BN] Mariah Flores RN 05/31/19 0135    Row Name 06/02/19 0925             Wound 04/26/19 1000 Left lower;lateral abdomen incision    Wound - Properties Group Date first assessed: 04/26/19 [AB] Time first assessed: 1000 [AB] Side: Left [TL] Orientation: lower;lateral [TL] Location: abdomen [TL] Type: incision [TL] Stage, Pressure Injury: other (see comments) [TL] Additional Comments: I and D of abscess, tunneling noted [TL] Recorded by:  [AB] Gerri Clark RN 05/19/19 1055 [TL] Guillermo PAIZ RN 04/27/19 1036    Dressing Appearance  intact  -MW      Drainage Characteristics/Odor  serosanguineous;serous;yellow  -MW      Drainage Amount  scant  -MW      Wound Output (mL)  25  -MW      Recorded by [MW] Katrina Vines, PT 06/02/19 1308      Row Name 06/02/19 0925             NPWT (Negative Pressure Wound Therapy) 05/21/19 0945    NPWT (Negative Pressure Wound Therapy) - Properties Group Placement Date: 05/21/19 [MF] Placement Time: 0945 [MF] Recorded by:  [MF] Usman Johnson, PT 05/21/19 1103    Therapy Setting  continuous therapy VAC ck   -MW      Pressure Setting  125 mmHg  -MW      Recorded by  [MW] Katrina Vines, PT 06/02/19 1308      Row Name 06/02/19 0925             Coping    Observed Emotional State  quiet  -MW      Recorded by [MW] Katrina Vines, PT 06/02/19 1308      Row Name 06/02/19 0925             Plan of Care Review    Plan of Care Reviewed With  family  -MW      Recorded by [MW] Katrina Vines, PT 06/02/19 1308      Row Name 06/02/19 0925             Outcome Summary/Treatment Plan (PT)    Anticipated Discharge Disposition (PT)  home with home health  -MW      Recorded by [MW] Katrina Vines, PT 06/02/19 1308        User Key  (r) = Recorded By, (t) = Taken By, (c) = Cosigned By    Initials Name Effective Dates Discipline    MF Usman Johnson, PT 06/19/15 -  PT    Katrina Saravia, PT 02/05/19 -  PT    Gerri Valderrama, RN 04/22/19 -  Nurse    PT Amy Villanueva LPN 03/14/16 -  Nurse    Mariah Ervin RN 02/04/19 -  Nurse    POOJA PAIZ, Guillermo Otto, RN 08/07/17 -  Nurse                PT Recommendation and Plan  Anticipated Discharge Disposition (PT): home with home health  Planned Therapy Interventions (PT Eval): wound care        Progress: no change   Outcome Summary/Treatment Plan (PT)  Anticipated Discharge Disposition (PT): home with home health  Progress: no change  Outcome Summary: LT lower abdominal VAC remains intact with good seal. Cont NPWT to promote granulation formation and exudate management. Expect to change VAC Tues/Wed or transition to home unit at D/C.   Plan of Care Reviewed With: patient            Time Calculation  PT Charges     Row Name 06/02/19 1310             Time Calculation    Start Time  0925  -MW        User Key  (r) = Recorded By, (t) = Taken By, (c) = Cosigned By    Initials Name Provider Type    Katrina Saravia, PT Physical Therapist           Therapy Charges for Today     Code Description Service Date Service Provider Modifiers Qty    27588625384 HC PT EVAL LOW COMPLEXITY 3 6/1/2019 Katrina Vines, PT GP 1    53573297449 HC PT  NEG PRESS WOUND TO 50SQCM DME3 6/1/2019 Katrina Vines, PT GP 1    50355042701 HC PT NEG PRESS WOUND TO 50SQCM DME1 6/2/2019 Katrina Vines, PT  1            PT G-Codes  AM-PAC 6 Clicks Score: 12        Katrina Vines, PT  6/2/2019

## 2019-06-02 NOTE — PLAN OF CARE
Problem: Patient Care Overview  Goal: Plan of Care Review  Outcome: Ongoing (interventions implemented as appropriate)   06/02/19 2087   Coping/Psychosocial   Plan of Care Reviewed With patient   Plan of Care Review   Progress no change   OTHER   Outcome Summary LT lower abdominal VAC remains intact with good seal. Cont NPWT to promote granulation formation and exudate management. Expect to change VAC Tues/Wed or transition to home unit at D/C.

## 2019-06-03 ENCOUNTER — TRANSITIONAL CARE MANAGEMENT TELEPHONE ENCOUNTER (OUTPATIENT)
Dept: INTERNAL MEDICINE | Facility: CLINIC | Age: 49
End: 2019-06-03

## 2019-06-03 ENCOUNTER — READMISSION MANAGEMENT (OUTPATIENT)
Dept: CALL CENTER | Facility: HOSPITAL | Age: 49
End: 2019-06-03

## 2019-06-03 NOTE — OUTREACH NOTE
Prep Survey      Responses   Facility patient discharged from?  [facility]   Is patient eligible?  Yes   Discharge diagnosis  INtractable vomitint with nausea, elevated troponin, abdominal wall cellulitis, DM II uncontrolle, PVD, essential HTN, DUNLAP cirrhosis   Does the patient have one of the following disease processes/diagnoses(primary or secondary)?  Other   Does the patient have Home health ordered?  No   Is there a DME ordered?  No   Comments regarding appointments  Pt to schedule follow up appointments   Prep survey completed?  Yes          Veronica Connelly RN

## 2019-06-05 ENCOUNTER — READMISSION MANAGEMENT (OUTPATIENT)
Dept: CALL CENTER | Facility: HOSPITAL | Age: 49
End: 2019-06-05

## 2019-06-05 NOTE — OUTREACH NOTE
Pt discharged from [facility] 6/2/19. Multiple attempts have been made to contact pt to complete FLOR call and confirm appt.  All attempts have been documented. The patient has a [facility] hospital follow up scheduled with Keyanna WOODSON tomorrow 6/6/19.

## 2019-06-05 NOTE — OUTREACH NOTE
Medical Week 1 Survey      Responses   Facility patient discharged from?  [facility]   Does the patient have one of the following disease processes/diagnoses(primary or secondary)?  Other   Is there a successful TCM telephone encounter documented?  No   Week 1 attempt successful?  Yes   Call start time  1640   Call end time  1649   Discharge diagnosis  Intractable vomiting with nausea, elevated troponin, abdominal wall cellulitis, DM II uncontrolle, PVD, essential HTN, DUNLAP cirrhosis   Is patient permission given to speak with other caregiver?  Yes   List who call center can speak with  Cindy, spouse   Person spoke with today (if not patient) and relationship  Cindy, spouse   Meds reviewed with patient/caregiver?  Yes   Is the patient having any side effects they believe may be caused by any medication additions or changes?  No   Does the patient have all medications ordered at discharge?  Yes   Is the patient taking all medications as directed (includes completed medication regime)?  Yes   Does the patient have a primary care provider?   Yes   Does the patient have an appointment with their PCP within 7 days of discharge?  Yes   Comments regarding PCP  Keyanna Leone, CHINTAN , Thursday Jun 6, 2019 1:00 PM    Has the patient kept scheduled appointments due by today?  No   Nursing Interventions  Advised to reschedule appointment [Wife states that she will call and reschedule appt with Dr Lenz, that patient did not feel well enough to go to scheduled appt. ]   Comments  Advised to call and schedule appt with Dr Dong as instructed.    What is the Home health agency?   [facility]   Has home health visited the patient within 72 hours of discharge?  Yes   What DME was ordered?  wound vac   Has all DME been delivered?  Yes   Did the patient receive a copy of their discharge instructions?  Yes   Nursing interventions  Reviewed instructions with patient   What is the patient's perception of their health status since  discharge?  Improving   Is the patient/caregiver able to teach back signs and symptoms related to disease process for when to call PCP?  Yes   Is the patient/caregiver able to teach back signs and symptoms related to disease process for when to call 911?  Yes   Is the patient/caregiver able to teach back the hierarchy of who to call/visit for symptoms/problems? PCP, Specialist, Home health nurse, Urgent Care, ED, 911  Yes   Week 1 call completed?  Yes          Veronica Devine RN

## 2019-06-07 LAB
FUNGUS WND CULT: NORMAL
MYCOBACTERIUM SPEC CULT: NORMAL
NIGHT BLUE STAIN TISS: NORMAL

## 2019-06-11 ENCOUNTER — OFFICE VISIT (OUTPATIENT)
Dept: INTERNAL MEDICINE | Facility: CLINIC | Age: 49
End: 2019-06-11

## 2019-06-11 VITALS
HEART RATE: 94 BPM | HEIGHT: 75 IN | DIASTOLIC BLOOD PRESSURE: 90 MMHG | RESPIRATION RATE: 20 BRPM | WEIGHT: 240 LBS | SYSTOLIC BLOOD PRESSURE: 130 MMHG | BODY MASS INDEX: 29.84 KG/M2 | OXYGEN SATURATION: 99 % | TEMPERATURE: 97.2 F

## 2019-06-11 DIAGNOSIS — IMO0002 DM (DIABETES MELLITUS) TYPE II UNCONTROLLED, PERIPH VASCULAR DISORDER: Primary | ICD-10-CM

## 2019-06-11 DIAGNOSIS — F31.62 BIPOLAR DISORDER, CURRENT EPISODE MIXED, MODERATE (HCC): ICD-10-CM

## 2019-06-11 DIAGNOSIS — Z91.14 NON COMPLIANCE W MEDICATION REGIMEN: ICD-10-CM

## 2019-06-11 DIAGNOSIS — K74.60 CIRRHOSIS OF LIVER WITHOUT ASCITES, UNSPECIFIED HEPATIC CIRRHOSIS TYPE (HCC): ICD-10-CM

## 2019-06-11 DIAGNOSIS — E66.9 OBESITY (BMI 30-39.9): ICD-10-CM

## 2019-06-11 DIAGNOSIS — K59.1 FUNCTIONAL DIARRHEA: ICD-10-CM

## 2019-06-11 DIAGNOSIS — I10 ESSENTIAL HYPERTENSION: ICD-10-CM

## 2019-06-11 PROCEDURE — 99214 OFFICE O/P EST MOD 30 MIN: CPT | Performed by: NURSE PRACTITIONER

## 2019-06-11 RX ORDER — INSULIN GLARGINE 100 [IU]/ML
30 INJECTION, SOLUTION SUBCUTANEOUS 2 TIMES DAILY
Qty: 60 ML | Refills: 1 | Status: ON HOLD | OUTPATIENT
Start: 2019-06-11 | End: 2019-10-23 | Stop reason: SDUPTHER

## 2019-06-11 RX ORDER — FUROSEMIDE 40 MG/1
40 TABLET ORAL DAILY
Qty: 90 TABLET | Refills: 1 | Status: SHIPPED | OUTPATIENT
Start: 2019-06-11 | End: 2019-07-11

## 2019-06-11 RX ORDER — TAMSULOSIN HYDROCHLORIDE 0.4 MG/1
0.8 CAPSULE ORAL NIGHTLY
Qty: 180 CAPSULE | Refills: 1
Start: 2019-06-11 | End: 2019-10-23 | Stop reason: HOSPADM

## 2019-06-11 RX ORDER — SPIRONOLACTONE 25 MG/1
25 TABLET ORAL DAILY
Qty: 90 TABLET | Refills: 1 | Status: SHIPPED | OUTPATIENT
Start: 2019-06-11 | End: 2019-07-11

## 2019-06-11 RX ORDER — LISINOPRIL 40 MG/1
40 TABLET ORAL
Qty: 90 TABLET | Refills: 1 | Status: SHIPPED | OUTPATIENT
Start: 2019-06-11 | End: 2019-07-11

## 2019-06-11 RX ORDER — UREA 10 %
1 LOTION (ML) TOPICAL 2 TIMES DAILY
Qty: 60 TABLET | Refills: 11 | Status: SHIPPED | OUTPATIENT
Start: 2019-06-11 | End: 2019-08-14 | Stop reason: HOSPADM

## 2019-06-11 RX ORDER — METOPROLOL TARTRATE 75 MG/1
75 TABLET, FILM COATED ORAL EVERY 12 HOURS SCHEDULED
Qty: 180 TABLET | Refills: 1 | Status: ON HOLD | OUTPATIENT
Start: 2019-06-11 | End: 2019-08-12 | Stop reason: SDUPTHER

## 2019-06-11 RX ORDER — DULOXETIN HYDROCHLORIDE 30 MG/1
30 CAPSULE, DELAYED RELEASE ORAL DAILY
Qty: 90 CAPSULE | Refills: 1 | Status: SHIPPED | OUTPATIENT
Start: 2019-06-11 | End: 2019-11-15 | Stop reason: HOSPADM

## 2019-06-11 NOTE — PROGRESS NOTES
"Transitional Care Follow Up Visit  Subjective     Kendrick Crystal is a 48 y.o. male who presents for a transitional care management visit.    Within 48 business hours after discharge our office contacted him via telephone to coordinate his care and needs.      I reviewed and discussed the details of that call along with the discharge summary, hospital problems, inpatient lab results, inpatient diagnostic studies, and consultation reports with Kendrick.     Current outpatient and discharge medications have been reconciled for the patient.    No flowsheet data found.  Risk for Readmission (LACE) Score: 14 (6/2/2019  6:01 AM)    Vitals:    06/11/19 1208   BP: 130/90   Pulse: 94   Resp: 20   Temp: 97.2 °F (36.2 °C)   TempSrc: Temporal   SpO2: 99%   Weight: 109 kg (240 lb)   Height: 190.5 cm (75\")         He has not scheduled with his infectious disease yet.  He is also not gone to gastroenterology for his Johnston cirrhosis.  BP has been elevated with home health however he has not been taking his medicines as instructed.  Is not taking sliding scale insulin.  Only taking Lantus.  Does not check his blood sugar regularly.  He has a fear of coming to the doctor and having diarrhea in the office so he frequently cancels or no-shows his appointments.  His wife reports that a probiotic was given to him at [facility] and this improved his diarrhea some.  He is also been noncompliant with the Lasix as the dose is too high and he cannot stand to the bathroom.       Course During Hospital Stay:  Kendrick and presented to [facility] ER on April 24 with abdominal wall cellulitis and abscess.  He was taken for debridement on April 26 with Dr. Allred and treated with IV antibiotics by infectious disease.  His A1c was greater than 14 at that time.  He had not been taking his insulin at home.  He was discharged on May 3.  He returned to [facility] ER on May 18 with swelling.  A wound VAC was placed on his abdomen.  He was supposed to " be discharged to [facility] but patient refused at this time.  He was discharged with home health.  He then presented back to the hospital on May 30 with intractable vomiting and diarrhea.  His antibiotics were discontinued.  His wound was healing.  He was discharged and advised to follow-up with infectious disease and his PCP on June 2nd.       The following portions of the patient's history were reviewed and updated as appropriate: allergies, current medications, past family history, past medical history, past social history, past surgical history and problem list.    Review of Systems   Constitutional: Positive for fatigue. Negative for activity change, appetite change, fever and unexpected weight change.   HENT: Negative for rhinorrhea, sinus pain and trouble swallowing.    Eyes: Negative for photophobia and pain.   Respiratory: Negative for chest tightness, shortness of breath and wheezing.    Cardiovascular: Positive for leg swelling. Negative for chest pain and palpitations.   Gastrointestinal: Positive for abdominal pain, diarrhea and nausea. Negative for abdominal distention, blood in stool and constipation.   Endocrine: Positive for heat intolerance, polydipsia, polyphagia and polyuria. Negative for cold intolerance.   Genitourinary: Negative for difficulty urinating, enuresis, frequency, genital sores and urgency.   Musculoskeletal: Positive for arthralgias, back pain, gait problem and myalgias. Negative for joint swelling.   Skin: Positive for wound. Negative for pallor and rash.   Allergic/Immunologic: Negative for immunocompromised state.   Neurological: Positive for weakness and headaches. Negative for dizziness, tremors, seizures, syncope and speech difficulty.   Hematological: Negative for adenopathy.   Psychiatric/Behavioral: Positive for dysphoric mood and sleep disturbance. Negative for suicidal ideas. The patient is nervous/anxious.        Objective   Physical Exam   Constitutional: He is  oriented to person, place, and time. He appears well-developed and well-nourished. No distress.   HENT:   Head: Normocephalic and atraumatic.   Eyes: Pupils are equal, round, and reactive to light.   Neck: Neck supple. No thyromegaly present.   Cardiovascular: Normal rate and regular rhythm.   Pulmonary/Chest: Effort normal and breath sounds normal. He has no decreased breath sounds. He has no wheezes. He has no rhonchi. He has no rales.   Abdominal: Bowel sounds are increased. There is generalized tenderness. There is no rigidity and no guarding.   Neurological: He is alert and oriented to person, place, and time.   Skin: Skin is warm and dry. Capillary refill takes 2 to 3 seconds. He is not diaphoretic.   Psychiatric: He has a normal mood and affect. His behavior is normal. Judgment and thought content normal.   Nursing note and vitals reviewed.      Assessment/Plan   Kendrick was seen today for hypertension and diabetes.    Diagnoses and all orders for this visit:    DM (diabetes mellitus) type II uncontrolled, periph vascular disorder (CMS/[facility])  -     LANTUS 100 UNIT/ML injection; Inject 30 Units under the skin into the appropriate area as directed 2 (Two) Times a Day.  -     insulin lispro (humaLOG) 100 UNIT/ML injection; Inject 0-14 Units under the skin into the appropriate area as directed 4 (Four) Times a Day With Meals & at Bedtime.  Sliding-scale discussed with patient and included in after visit summary.  Discussed using glucose tablets for hypogylcemia (denies any recent).  Follow-up in 4 weeks for an A1c check  Essential hypertension  -     lisinopril (PRINIVIL,ZESTRIL) 40 MG tablet; Take 1 tablet by mouth Daily for 30 days.  -     Metoprolol Tartrate 75 MG tablet; Take 75 mg by mouth Every 12 (Twelve) Hours.  -     tamsulosin (FLOMAX) 0.4 MG capsule 24 hr capsule; Take 2 capsules by mouth Every Night.  -     furosemide (LASIX) 40 MG tablet; Take 1 tablet by mouth Daily for 30 days.  BP still elevated,  increase to 40 mg, follow-up in 4 weeks  Cirrhosis of liver without ascites, unspecified hepatic cirrhosis type (CMS/HCC)  -     spironolactone (ALDACTONE) 25 MG tablet; Take 1 tablet by mouth Daily for 30 days.  -     furosemide (LASIX) 40 MG tablet; Take 1 tablet by mouth Daily for 30 days.         Advised I will reduce his Lasix dose but needs him to come back in 4 weeks and follow-up with gastroenterology for reassessment.  Bipolar disorder, current episode mixed, moderate (CMS/HCC)  -     DULoxetine (CYMBALTA) 30 MG capsule; Take 1 capsule by mouth Daily.          Has been out of cymbalta- refilled today  Functional diarrhea  -     Lactobacillus tablet; Take 1 tablet by mouth 2 (Two) Times a Day.        Non-compliance  Complicates all above problems  Obesity (BMI 30-39.9)  Complicates all above problems    Follow up in 4 weeks for a recheck  CHINTAN Ku

## 2019-06-12 ENCOUNTER — READMISSION MANAGEMENT (OUTPATIENT)
Dept: CALL CENTER | Facility: HOSPITAL | Age: 49
End: 2019-06-12

## 2019-06-17 ENCOUNTER — TELEPHONE (OUTPATIENT)
Dept: INTERNAL MEDICINE | Facility: CLINIC | Age: 49
End: 2019-06-17

## 2019-06-17 NOTE — TELEPHONE ENCOUNTER
SKY MOTA FROM [facility] CALLED AND WANTED TO LET YOU KNOW THAT THE PATIENT DECLINED HIS VISIT TODAY DUE TO HAVING SOME APPOINTMENTS.

## 2019-06-19 ENCOUNTER — READMISSION MANAGEMENT (OUTPATIENT)
Dept: CALL CENTER | Facility: HOSPITAL | Age: 49
End: 2019-06-19

## 2019-06-19 DIAGNOSIS — IMO0002 DM (DIABETES MELLITUS) TYPE II UNCONTROLLED, PERIPH VASCULAR DISORDER: ICD-10-CM

## 2019-06-19 DIAGNOSIS — Z79.4 TYPE 2 DIABETES MELLITUS WITH DIABETIC PERIPHERAL ANGIOPATHY AND GANGRENE, WITH LONG-TERM CURRENT USE OF INSULIN (HCC): Primary | Chronic | ICD-10-CM

## 2019-06-19 DIAGNOSIS — L97.919 DIABETIC ULCER OF RIGHT LOWER LEG (HCC): ICD-10-CM

## 2019-06-19 DIAGNOSIS — E11.622 DIABETIC ULCER OF RIGHT LOWER LEG (HCC): ICD-10-CM

## 2019-06-19 DIAGNOSIS — E11.52 TYPE 2 DIABETES MELLITUS WITH DIABETIC PERIPHERAL ANGIOPATHY AND GANGRENE, WITH LONG-TERM CURRENT USE OF INSULIN (HCC): Primary | Chronic | ICD-10-CM

## 2019-06-19 NOTE — OUTREACH NOTE
Medical Week 3 Survey      Responses   Facility patient discharged from?  [place]   Does the patient have one of the following disease processes/diagnoses(primary or secondary)?  Other   Week 3 attempt successful?  Yes   Call start time  1614   Call end time  1634   Discharge diagnosis  Intractable vomiting with nausea, elevated troponin, abdominal wall cellulitis, DM II uncontrolle, PVD, essential HTN, DUNLAP cirrhosis   Person spoke with today (if not patient) and relationship  Cindy, spouse   Meds reviewed with patient/caregiver?  Yes   Is the patient taking all medications as directed (includes completed medication regime)?  Yes   Medication comments  Insulin added today    Has the patient kept scheduled appointments due by today?  Yes   Comments  Has f/u with PCP and f/u again on 07/09/2019.  the home health nurse instructed that he needed to go to wound care. Wife will calll tomorrow for appt.     What is the Home health agency?   [facility]   Home health comments  Home Health nurse came today and evaluated the wound   What DME was ordered?  No wound vac. Wet to dry dressings. 1-2 times a day.   Comments  Wife says he is having a drainage from his abdomen and it has an odor. Wife says no redness, no fever and it looks better. Advised to call MD.   What is the patient's perception of their health status since discharge?  Improving   Additional teach back comments  Spouse says he was told at the hospital to follow up with Dr. Delfin EDMONDS. She has called Dr Goodman office multiple times and left messages but has not received a callback. Message sent to [facility] for assistance with appt   Week 3 Call Completed?  Yes          Sammi Spring RN

## 2019-06-20 NOTE — OUTREACH NOTE
Case Management Call Center Follow-up      Responses   [facility] Call Center Tracking Reason?  Other (specify in comments)   Other Tracking Comments  GI follow-up appointment   Has the Call Center Case Management Follow-up issue been resolved?  No   Follow-up Comments  THEO spoke to pt's spouse who stated they would still like the [facility] appointment.  They are agreeable to any day/time/provider.  THEO has called Dr. Lenz's office twice and left two messages requesting call back to arrange follow-up appointment.  THEO has not yet received a call back.  THEO has provided pt/spouse update and will attempt follow-up again tomorrow.          JOHANNA Chen    6/20/2019, 3:17 PM

## 2019-06-24 ENCOUNTER — TELEPHONE (OUTPATIENT)
Dept: INTERNAL MEDICINE | Facility: CLINIC | Age: 49
End: 2019-06-24

## 2019-06-24 NOTE — TELEPHONE ENCOUNTER
Raymond from [facility] called stating he is needing verbal orders on Pt.    Please call Ayaka back at, [phone].    Thank you,

## 2019-06-25 NOTE — OUTREACH NOTE
Case Management Call Center Follow-up      Responses   [facility] Call Center Tracking Reason?  Other (specify in comments)   Other Tracking Comments  GI follow-up appointment   Has the Call Center Case Management Follow-up issue been resolved?  Yes   Follow-up Comments  THEO spoke to pt's spouse who stated they would still like [place] [facility] [place]  They are agreeable to any day/time/provider.  THEO has called Dr. Lenz's office twice and left two messages requesting call back to arrange follow-up appointment.  THEO has not yet received a call back.  THEO has provided pt/spouse update and will attempt follow-up again tomorrow.  On 6/25/19 THEO was finally able to schedule follow-up for 8/20/19 at 3:15.  THEO called and updated pt's spouse of the appointment.    Time CC/CM Tracking Completed  [id number]          JOHANNA Chen    6/25/2019, 2:44 PM

## 2019-06-26 ENCOUNTER — APPOINTMENT (OUTPATIENT)
Dept: PHYSICAL THERAPY | Facility: HOSPITAL | Age: 49
End: 2019-06-26

## 2019-06-28 ENCOUNTER — READMISSION MANAGEMENT (OUTPATIENT)
Dept: CALL CENTER | Facility: HOSPITAL | Age: 49
End: 2019-06-28

## 2019-06-28 NOTE — OUTREACH NOTE
Medical Week 4 Survey      Responses   Facility patient discharged from?  [facility]   Does the patient have one of the following disease processes/diagnoses(primary or secondary)?  Other   Week 4 attempt successful?  No          Rox Valdez RN

## 2019-07-12 ENCOUNTER — OFFICE VISIT (OUTPATIENT)
Dept: INTERNAL MEDICINE | Facility: CLINIC | Age: 49
End: 2019-07-12

## 2019-07-12 VITALS
TEMPERATURE: 97 F | HEART RATE: 103 BPM | RESPIRATION RATE: 20 BRPM | BODY MASS INDEX: 30 KG/M2 | SYSTOLIC BLOOD PRESSURE: 140 MMHG | DIASTOLIC BLOOD PRESSURE: 96 MMHG | HEIGHT: 75 IN | OXYGEN SATURATION: 98 %

## 2019-07-12 DIAGNOSIS — L97.919 DIABETIC ULCER OF RIGHT LOWER LEG (HCC): ICD-10-CM

## 2019-07-12 DIAGNOSIS — Z89.512 S/P BKA (BELOW KNEE AMPUTATION), LEFT (HCC): ICD-10-CM

## 2019-07-12 DIAGNOSIS — Z91.14 NON COMPLIANCE W MEDICATION REGIMEN: ICD-10-CM

## 2019-07-12 DIAGNOSIS — Z79.4 TYPE 2 DIABETES MELLITUS WITH DIABETIC PERIPHERAL ANGIOPATHY AND GANGRENE, WITH LONG-TERM CURRENT USE OF INSULIN (HCC): Primary | Chronic | ICD-10-CM

## 2019-07-12 DIAGNOSIS — E11.52 TYPE 2 DIABETES MELLITUS WITH DIABETIC PERIPHERAL ANGIOPATHY AND GANGRENE, WITH LONG-TERM CURRENT USE OF INSULIN (HCC): Primary | Chronic | ICD-10-CM

## 2019-07-12 DIAGNOSIS — K74.60 CIRRHOSIS OF LIVER WITHOUT ASCITES, UNSPECIFIED HEPATIC CIRRHOSIS TYPE (HCC): ICD-10-CM

## 2019-07-12 DIAGNOSIS — E78.2 MIXED HYPERLIPIDEMIA: ICD-10-CM

## 2019-07-12 DIAGNOSIS — I10 ESSENTIAL HYPERTENSION: ICD-10-CM

## 2019-07-12 DIAGNOSIS — E11.622 DIABETIC ULCER OF RIGHT LOWER LEG (HCC): ICD-10-CM

## 2019-07-12 LAB
ALBUMIN SERPL-MCNC: 3.5 G/DL (ref 3.5–5.2)
ALBUMIN/GLOB SERPL: 0.9 G/DL
ALP SERPL-CCNC: 157 U/L (ref 39–117)
ALT SERPL W P-5'-P-CCNC: 29 U/L (ref 1–41)
ANION GAP SERPL CALCULATED.3IONS-SCNC: 14.3 MMOL/L (ref 5–15)
ARTICHOKE IGE QN: 107 MG/DL (ref 0–100)
AST SERPL-CCNC: 17 U/L (ref 1–40)
BASOPHILS # BLD AUTO: 0.07 10*3/MM3 (ref 0–0.2)
BASOPHILS NFR BLD AUTO: 0.5 % (ref 0–1.5)
BILIRUB SERPL-MCNC: 0.3 MG/DL (ref 0.2–1.2)
BUN BLD-MCNC: 15 MG/DL (ref 6–20)
BUN/CREAT SERPL: 21.4 (ref 7–25)
CALCIUM SPEC-SCNC: 9.7 MG/DL (ref 8.6–10.5)
CHLORIDE SERPL-SCNC: 93 MMOL/L (ref 98–107)
CHOLEST SERPL-MCNC: 207 MG/DL (ref 0–200)
CO2 SERPL-SCNC: 23.7 MMOL/L (ref 22–29)
CREAT BLD-MCNC: 0.7 MG/DL (ref 0.76–1.27)
DEPRECATED RDW RBC AUTO: 44.7 FL (ref 37–54)
EOSINOPHIL # BLD AUTO: 0.18 10*3/MM3 (ref 0–0.4)
EOSINOPHIL NFR BLD AUTO: 1.4 % (ref 0.3–6.2)
ERYTHROCYTE [DISTWIDTH] IN BLOOD BY AUTOMATED COUNT: 14.5 % (ref 12.3–15.4)
GFR SERPL CREATININE-BSD FRML MDRD: 119 ML/MIN/1.73
GLOBULIN UR ELPH-MCNC: 3.7 GM/DL
GLUCOSE BLD-MCNC: 449 MG/DL (ref 65–99)
HBA1C MFR BLD: 13.43 % (ref 4.8–5.6)
HCT VFR BLD AUTO: 42.2 % (ref 37.5–51)
HDLC SERPL-MCNC: 25 MG/DL (ref 40–60)
HGB BLD-MCNC: 13.7 G/DL (ref 13–17.7)
LDLC SERPL CALC-MCNC: ABNORMAL MG/DL (ref 0–100)
LDLC/HDLC SERPL: ABNORMAL {RATIO}
LYMPHOCYTES # BLD AUTO: 2.19 10*3/MM3 (ref 0.7–3.1)
LYMPHOCYTES NFR BLD AUTO: 16.8 % (ref 19.6–45.3)
MCH RBC QN AUTO: 27.6 PG (ref 26.6–33)
MCHC RBC AUTO-ENTMCNC: 32.5 G/DL (ref 31.5–35.7)
MCV RBC AUTO: 84.9 FL (ref 79–97)
MONOCYTES # BLD AUTO: 0.84 10*3/MM3 (ref 0.1–0.9)
MONOCYTES NFR BLD AUTO: 6.4 % (ref 5–12)
NEUTROPHILS # BLD AUTO: 9.71 10*3/MM3 (ref 1.7–7)
NEUTROPHILS NFR BLD AUTO: 74.5 % (ref 42.7–76)
PLATELET # BLD AUTO: 205 10*3/MM3 (ref 140–450)
PMV BLD AUTO: 13.5 FL (ref 6–12)
POTASSIUM BLD-SCNC: 4.7 MMOL/L (ref 3.5–5.2)
PROT SERPL-MCNC: 7.2 G/DL (ref 6–8.5)
RBC # BLD AUTO: 4.97 10*6/MM3 (ref 4.14–5.8)
SODIUM BLD-SCNC: 131 MMOL/L (ref 136–145)
TRIGL SERPL-MCNC: 438 MG/DL (ref 0–150)
VLDLC SERPL-MCNC: ABNORMAL MG/DL (ref 5–40)
WBC NRBC COR # BLD: 13.04 10*3/MM3 (ref 3.4–10.8)

## 2019-07-12 PROCEDURE — 99214 OFFICE O/P EST MOD 30 MIN: CPT | Performed by: NURSE PRACTITIONER

## 2019-07-12 PROCEDURE — 83036 HEMOGLOBIN GLYCOSYLATED A1C: CPT | Performed by: NURSE PRACTITIONER

## 2019-07-12 PROCEDURE — 85025 COMPLETE CBC W/AUTO DIFF WBC: CPT | Performed by: NURSE PRACTITIONER

## 2019-07-12 PROCEDURE — 83721 ASSAY OF BLOOD LIPOPROTEIN: CPT | Performed by: NURSE PRACTITIONER

## 2019-07-12 PROCEDURE — 80053 COMPREHEN METABOLIC PANEL: CPT | Performed by: NURSE PRACTITIONER

## 2019-07-12 PROCEDURE — 80061 LIPID PANEL: CPT | Performed by: NURSE PRACTITIONER

## 2019-07-12 RX ORDER — CALCIUM POLYCARBOPHIL 625 MG 625 MG/1
625 TABLET ORAL DAILY
Qty: 30 TABLET | Refills: 3 | Status: SHIPPED | OUTPATIENT
Start: 2019-07-12 | End: 2019-07-12

## 2019-07-12 NOTE — PROGRESS NOTES
"Subjective   Kendrick Crystal is a 48 y.o. male here today for diabetes.    Chief Complaint   Patient presents with   • Diabetes   Kendrick is here today for diabetes and hypertension.  He has not taken his medication this morning.  His BP is elevated.  He does not check his blood pressure at home.  He does report he has been compliant with his insulin- taking 30 units BID lantus and using sliding scale.  Does not check blood sugar.  He is being refitted for a new prostatic and would like me to look at his stump to make sure that it is healed.  He denies any chest pain, shortness of breath, lower extremity edema.  He denies wanting vaccinations today.  His biggest complaint is his chronic diarrhea.  He has been referred to gastroenterology for this and has a history of C. difficile.  He is \"no showed\" his last 3 appointments however he does have an appointment next month with him.  They are also concerned about his end-stage cirrhosis and his life expectancy.    Review of Systems   Constitutional: Negative for activity change, appetite change, fatigue, unexpected weight gain and unexpected weight loss.   Eyes: Negative for blurred vision and double vision.   Respiratory: Negative for chest tightness and shortness of breath.    Cardiovascular: Negative for chest pain and leg swelling.   Gastrointestinal: Positive for blood in stool and diarrhea. Negative for nausea and vomiting.   Endocrine: Positive for polydipsia, polyphagia and polyuria. Negative for cold intolerance and heat intolerance.   Musculoskeletal: Positive for gait problem.   Skin: Negative for rash and skin lesions.   Neurological: Negative for dizziness, seizures, syncope, facial asymmetry, weakness, light-headedness, headache, memory problem and confusion.   Psychiatric/Behavioral: Positive for sleep disturbance and depressed mood. The patient is nervous/anxious.        Past Medical History:   Diagnosis Date   • JUAN (acute kidney injury) (CMS/[facility]) " 7/29/2018   • Arthritis    • Bipolar 1 disorder (CMS/HCC)    • Cellulitis of right anterior lower leg 7/29/2018   • Cirrhosis (CMS/HCC)    • Counseling for insulin pump    • Depression    • Diabetes mellitus (CMS/HCC)    • Encephalopathy, hepatic (CMS/HCC)    • H/O degenerative disc disease    • History of Lissa's gangrene    • Hyperlipemia    • Hypertension    • multiple Skin abscesses    • DUNLAP, bx showed stage IV fibrosis    • Neuropathy    • Peripheral vascular disease (CMS/HCC)    • Thrombophlebitis      Family History   Problem Relation Age of Onset   • Arthritis Mother    • Hypertension Mother    • Kidney disease Mother    • Stroke Mother    • Heart attack Father    • Arthritis Father    • Diabetes Father    • Hyperlipidemia Father    • Hypertension Father    • Mental illness Sister    • Diabetes Brother    • Hypertension Brother    • Obesity Brother      Past Surgical History:   Procedure Laterality Date   • ABDOMINAL WALL ABSCESS INCISION AND DRAINAGE N/A 4/26/2019    Procedure: ABDOMINAL WALL DEBRIDEMENT;  Surgeon: Fadi Kern MD;  Location:  [facility];  Service: General   • AMPUTATION DIGIT Left 1/30/2017    Procedure: left fourth and fifth transmetatarsal toe amputation ;  Surgeon: Juancho Martinez MD;  Location:  [facility];  Service:    • BELOW KNEE AMPUTATION Left 3/2/2017    Procedure: AMPUTATION BELOW KNEE, SHMUEL;  Surgeon: Juancho Martinez MD;  Location:  [facility] OR;  Service:    • ENDOSCOPY     • LEG SURGERY     • TESTICLE SURGERY      DEBRIDEMENT FROM GANGRENE   • TONSILLECTOMY  1975     Social History     Socioeconomic History   • Marital status:      Spouse name: Not on file   • Number of children: 1   • Years of education: Not on file   • Highest education level: Not on file   Occupational History   • Occupation: Security     Comment: Disabled-Diabetic Retinopathy   Tobacco Use   • Smoking status: Never Smoker   • Smokeless tobacco: Never Used   Substance and Sexual Activity   •  "Alcohol use: No   • Drug use: No   • Sexual activity: Defer   Social History Narrative    L[facility] in [facility]         Current Outpatient Medications:   •  acetaminophen (TYLENOL) 325 MG tablet, Take 2 tablets by mouth Every 4 (Four) Hours As Needed for mild pain (1-3)., Disp: , Rfl: 0  •  DULoxetine (CYMBALTA) 30 MG capsule, Take 1 capsule by mouth Daily., Disp: 90 capsule, Rfl: 1  •  gabapentin (NEURONTIN) 400 MG capsule, Take 3 capsules by mouth 3 (Three) Times a Day., Disp: , Rfl:   •  glucose blood test strip, Use as instructed, Disp: 100 each, Rfl: 0  •  glucose monitoring kit (FREESTYLE) monitoring kit, 1 each As Needed (testing glucose)., Disp: 1 each, Rfl: 0  •  HYDROcodone-acetaminophen (NORCO)  MG per tablet, Take 1 tablet by mouth 3 (Three) Times a Day As Needed for Moderate Pain  or Severe Pain ., Disp: , Rfl:   •  insulin lispro (humaLOG) 100 UNIT/ML injection, Inject 0-14 Units under the skin into the appropriate area as directed 4 (Four) Times a Day With Meals & at Bedtime., Disp: 60 mL, Rfl: 1  •  Lactobacillus tablet, Take 1 tablet by mouth 2 (Two) Times a Day., Disp: 60 tablet, Rfl: 11  •  Lancets (FREESTYLE) lancets, Testing qAC/HS, Disp: 100 each, Rfl: 0  •  LANTUS 100 UNIT/ML injection, Inject 30 Units under the skin into the appropriate area as directed 2 (Two) Times a Day., Disp: 60 mL, Rfl: 1  •  melatonin 5 MG tablet tablet, Take 5 mg by mouth Every Night., Disp: , Rfl:   •  Metoprolol Tartrate 75 MG tablet, Take 75 mg by mouth Every 12 (Twelve) Hours., Disp: 180 tablet, Rfl: 1  •  Needle, Disp, (BD DISP NEEDLES) 20G X 1\" misc, 1 each 4 (Four) Times a Day., Disp: 100 each, Rfl: 0  •  Needle, Disp, (BD DISP NEEDLES) 30G X 1/2\" misc, 1 each 4 (Four) Times a Day., Disp: 100 each, Rfl: 0  •  Syringe, Disposable, (SYRINGE 2-3 ML) 3 ML misc, 1 each 4 (Four) Times a Day., Disp: 100 each, Rfl: 0  •  tamsulosin (FLOMAX) 0.4 MG capsule 24 hr capsule, Take 2 capsules by mouth Every Night., " "Disp: 180 capsule, Rfl: 1    Objective   Vitals:    07/12/19 1002   BP: 140/96   BP Location: Left arm   Patient Position: Sitting   Cuff Size: Adult   Pulse: 103   Resp: 20   Temp: 97 °F (36.1 °C)   TempSrc: Temporal   SpO2: 98%   Weight: Comment: unable to stand   Height: 190.5 cm (75\")   PainSc: 0-No pain     Body mass index is 30 kg/m².  Physical Exam   Constitutional: He is oriented to person, place, and time. He appears well-developed and well-nourished. No distress.   Eyes: Pupils are equal, round, and reactive to light.   Neck: Neck supple. No thyromegaly present.   Cardiovascular: Normal rate and regular rhythm.   Pulmonary/Chest: Effort normal and breath sounds normal.    Kendrick had a diabetic foot exam performed today.    Neurological Sensory Findings - Altered hot/cold right ankle/foot discrimination.Altered hot/cold left ankle/foot discrimination: BKA. Altered sharp/dull right ankle/foot discrimination. Right ankle/foot altered proprioception.  Vascular Status -  His right foot exhibits normal foot vasculature  and no edema.  Skin Integrity  -  His right foot skin is intact.  He has no right foot ulcer, right heel is not dry and cracked, no right foot warmth, no right foot blister and no right foot gangrenous changes..  Neurological: He is alert and oriented to person, place, and time.   Skin: Skin is warm and dry. Capillary refill takes 2 to 3 seconds. He is not diaphoretic.        Psychiatric: He has a normal mood and affect. His behavior is normal. Judgment and thought content normal.   Nursing note and vitals reviewed.      Assessment/Plan   Problem List Items Addressed This Visit        Cardiovascular and Mediastinum    Type 2 diabetes mellitus with circulatory disorder and uncontrolled - Primary (Chronic)    Relevant Medications    LANTUS 100 UNIT/ML injection    insulin lispro (humaLOG) 100 UNIT/ML injection    Other Relevant Orders    Hemoglobin A1c    Essential hypertension    Relevant " Medications    Metoprolol Tartrate 75 MG tablet    tamsulosin (FLOMAX) 0.4 MG capsule 24 hr capsule    Hyperlipemia    Relevant Orders    Lipid Panel       Digestive    DUNLAP Cirrhosis    Relevant Orders    CBC & Differential    Comprehensive Metabolic Panel    CBC Auto Differential       Endocrine    Diabetic ulcer of right lower leg (CMS/HCC)    Relevant Medications    LANTUS 100 UNIT/ML injection    insulin lispro (humaLOG) 100 UNIT/ML injection    Other Relevant Orders    Hemoglobin A1c       Other    Non-compliance    S/P BKA (below knee amputation), left (CMS/HCC)        Kendrick was seen today for diabetes.    Diagnoses and all orders for this visit:    Type 2 diabetes mellitus with diabetic peripheral angiopathy and gangrene, with long-term current use of insulin (CMS/HCC)  -     Hemoglobin A1c  We will recheck A1c today.  Last A1c over 14.  Diabetic foot exam performed today on right foot.  Stump of left leg clean dry and intact and appropriate to be fitted for new prosthetic.  Essential hypertension  Noncompliance.  Patient has not taken his blood pressure medicine.  Compliance stressed  Mixed hyperlipidemia  -     Lipid Panel    Diabetic ulcer of right lower leg (CMS/HCC)  -     Hemoglobin A1c    Cirrhosis of liver without ascites, unspecified hepatic cirrhosis type (CMS/HCC)  -     CBC & Differential  -     Comprehensive Metabolic Panel  -     CBC Auto Differential  Discussed symptoms of worsening liver failure.  Advised importance of following up with GI.  Discussed importance of blood sugar control to facilitate healing and overall well-being.  S/P BKA (below knee amputation), left (CMS/HCC)  Stable and appropriate for new prosthetic to be fitted  Non-compliance  Again compliance stressed.             The patient was counseled regarding diagnostic results, impressions, prognosis, instructions for management, risk factor reductions, education, and importance of treatment compliance.  The patient verbalized  understanding of and agreement with the plan of care.    Advised patient to call with any further questions and any new or worsening symptoms.     Return for Medicare Wellness with next visit.      CHINTAN Mayfield    Please note that portions of this note were completed with a voice recognition program. Efforts were made to edit the dictations, but occasionally words are mistranscribed.

## 2019-07-15 ENCOUNTER — TELEPHONE (OUTPATIENT)
Dept: INTERNAL MEDICINE | Facility: CLINIC | Age: 49
End: 2019-07-15

## 2019-07-15 DIAGNOSIS — IMO0002 DM (DIABETES MELLITUS) TYPE II UNCONTROLLED, PERIPH VASCULAR DISORDER: ICD-10-CM

## 2019-07-15 DIAGNOSIS — K74.60 CIRRHOSIS OF LIVER WITHOUT ASCITES, UNSPECIFIED HEPATIC CIRRHOSIS TYPE (HCC): Primary | ICD-10-CM

## 2019-07-15 NOTE — TELEPHONE ENCOUNTER
PLEASE CALL ELIF [phone] ABOUT HIS LIVER. HE IS HAVING PROBLEMS. HE WANTS TO GO TO [facility] SO HE NEEDS A REF. TO [facility]  PER DR. CAVANAUGH. PLEASE CALL HER ASAP THANK YOU.

## 2019-07-30 ENCOUNTER — HOSPITAL ENCOUNTER (INPATIENT)
Facility: HOSPITAL | Age: 49
LOS: 15 days | Discharge: HOME HEALTH | End: 2019-08-14
Attending: EMERGENCY MEDICINE | Admitting: COLON & RECTAL SURGERY

## 2019-07-30 ENCOUNTER — APPOINTMENT (OUTPATIENT)
Dept: CT IMAGING | Facility: HOSPITAL | Age: 49
End: 2019-07-30

## 2019-07-30 DIAGNOSIS — N39.0 ACUTE UTI: ICD-10-CM

## 2019-07-30 DIAGNOSIS — K61.1 ABSCESS OF RECTUM: ICD-10-CM

## 2019-07-30 DIAGNOSIS — K65.1 INTRA-ABDOMINAL ABSCESS (HCC): Primary | ICD-10-CM

## 2019-07-30 DIAGNOSIS — Z78.9 IMPAIRED MOBILITY AND ADLS: ICD-10-CM

## 2019-07-30 DIAGNOSIS — N17.9 AKI (ACUTE KIDNEY INJURY) (HCC): ICD-10-CM

## 2019-07-30 DIAGNOSIS — IMO0002 DM (DIABETES MELLITUS) TYPE II UNCONTROLLED, PERIPH VASCULAR DISORDER: ICD-10-CM

## 2019-07-30 DIAGNOSIS — E11.52 TYPE 2 DIABETES MELLITUS WITH DIABETIC PERIPHERAL ANGIOPATHY AND GANGRENE, WITH LONG-TERM CURRENT USE OF INSULIN (HCC): Chronic | ICD-10-CM

## 2019-07-30 DIAGNOSIS — A41.9 SEPSIS, DUE TO UNSPECIFIED ORGANISM: ICD-10-CM

## 2019-07-30 DIAGNOSIS — I10 ESSENTIAL HYPERTENSION: ICD-10-CM

## 2019-07-30 DIAGNOSIS — Z74.09 IMPAIRED MOBILITY AND ADLS: ICD-10-CM

## 2019-07-30 DIAGNOSIS — Z79.4 TYPE 2 DIABETES MELLITUS WITH DIABETIC PERIPHERAL ANGIOPATHY AND GANGRENE, WITH LONG-TERM CURRENT USE OF INSULIN (HCC): Chronic | ICD-10-CM

## 2019-07-30 DIAGNOSIS — R73.9 HYPERGLYCEMIA: ICD-10-CM

## 2019-07-30 PROBLEM — L02.91 ABSCESS: Status: ACTIVE | Noted: 2019-07-30

## 2019-07-30 PROBLEM — E87.1 HYPONATREMIA: Status: ACTIVE | Noted: 2019-07-30

## 2019-07-30 PROBLEM — R11.2 INTRACTABLE VOMITING WITH NAUSEA: Status: RESOLVED | Noted: 2019-05-30 | Resolved: 2019-07-30

## 2019-07-30 PROBLEM — L03.115 CELLULITIS OF RIGHT ANTERIOR LOWER LEG: Status: RESOLVED | Noted: 2018-07-29 | Resolved: 2019-07-30

## 2019-07-30 PROBLEM — T14.8XXA WOUND INFECTION: Status: RESOLVED | Noted: 2017-04-25 | Resolved: 2019-07-30

## 2019-07-30 PROBLEM — L03.311 ABDOMINAL WALL CELLULITIS: Status: RESOLVED | Noted: 2019-05-20 | Resolved: 2019-07-30

## 2019-07-30 PROBLEM — L97.919 DIABETIC ULCER OF RIGHT LOWER LEG (HCC): Status: RESOLVED | Noted: 2018-07-29 | Resolved: 2019-07-30

## 2019-07-30 PROBLEM — J90 PLEURAL EFFUSION, BILATERAL: Status: RESOLVED | Noted: 2019-05-20 | Resolved: 2019-07-30

## 2019-07-30 PROBLEM — L08.9 WOUND INFECTION: Status: RESOLVED | Noted: 2017-04-25 | Resolved: 2019-07-30

## 2019-07-30 PROBLEM — R77.8 ELEVATED TROPONIN: Status: RESOLVED | Noted: 2019-05-30 | Resolved: 2019-07-30

## 2019-07-30 PROBLEM — R60.1 ANASARCA: Status: RESOLVED | Noted: 2019-05-19 | Resolved: 2019-07-30

## 2019-07-30 PROBLEM — E11.622 DIABETIC ULCER OF RIGHT LOWER LEG (HCC): Status: RESOLVED | Noted: 2018-07-29 | Resolved: 2019-07-30

## 2019-07-30 PROBLEM — F34.1 DYSTHYMIA: Status: RESOLVED | Noted: 2017-04-25 | Resolved: 2019-07-30

## 2019-07-30 LAB
ALBUMIN SERPL-MCNC: 3.3 G/DL (ref 3.5–5.2)
ALBUMIN/GLOB SERPL: 0.8 G/DL
ALP SERPL-CCNC: 177 U/L (ref 39–117)
ALT SERPL W P-5'-P-CCNC: 19 U/L (ref 1–41)
ANION GAP SERPL CALCULATED.3IONS-SCNC: 12 MMOL/L (ref 5–15)
AST SERPL-CCNC: 17 U/L (ref 1–40)
ATMOSPHERIC PRESS: ABNORMAL MMHG
BACTERIA UR QL AUTO: ABNORMAL /HPF
BASE EXCESS BLDV CALC-SCNC: 1.4 MMOL/L (ref -2–2)
BASOPHILS # BLD AUTO: 0.09 10*3/MM3 (ref 0–0.2)
BASOPHILS NFR BLD AUTO: 0.7 % (ref 0–1.5)
BDY SITE: ABNORMAL
BILIRUB SERPL-MCNC: 0.2 MG/DL (ref 0.2–1.2)
BILIRUB UR QL STRIP: NEGATIVE
BODY TEMPERATURE: 37 C
BUN BLD-MCNC: 19 MG/DL (ref 6–20)
BUN/CREAT SERPL: 24.1 (ref 7–25)
CALCIUM SPEC-SCNC: 9.3 MG/DL (ref 8.6–10.5)
CHLORIDE SERPL-SCNC: 88 MMOL/L (ref 98–107)
CLARITY UR: ABNORMAL
CO2 BLDA-SCNC: 29 MMOL/L (ref 23–27)
CO2 SERPL-SCNC: 26 MMOL/L (ref 22–29)
COHGB MFR BLD: 1.4 %
COLOR UR: YELLOW
CREAT BLD-MCNC: 0.79 MG/DL (ref 0.76–1.27)
D-LACTATE SERPL-SCNC: 1.8 MMOL/L (ref 0.5–2)
D-LACTATE SERPL-SCNC: 3.6 MMOL/L (ref 0.5–2)
DEPRECATED RDW RBC AUTO: 44 FL (ref 37–54)
EOSINOPHIL # BLD AUTO: 0.13 10*3/MM3 (ref 0–0.4)
EOSINOPHIL NFR BLD AUTO: 1.1 % (ref 0.3–6.2)
ERYTHROCYTE [DISTWIDTH] IN BLOOD BY AUTOMATED COUNT: 14.3 % (ref 12.3–15.4)
GFR SERPL CREATININE-BSD FRML MDRD: 104 ML/MIN/1.73
GLOBULIN UR ELPH-MCNC: 3.9 GM/DL
GLUCOSE BLD-MCNC: 687 MG/DL (ref 65–99)
GLUCOSE BLDC GLUCOMTR-MCNC: 434 MG/DL (ref 70–130)
GLUCOSE BLDC GLUCOMTR-MCNC: 451 MG/DL (ref 70–130)
GLUCOSE BLDC GLUCOMTR-MCNC: 475 MG/DL (ref 70–130)
GLUCOSE UR STRIP-MCNC: ABNORMAL MG/DL
HCO3 BLDV-SCNC: 27.5 MMOL/L (ref 22–28)
HCT VFR BLD AUTO: 42.9 % (ref 37.5–51)
HGB BLD-MCNC: 13.7 G/DL (ref 13–17.7)
HGB BLDA-MCNC: 13.9 G/DL (ref 13.5–17.5)
HGB UR QL STRIP.AUTO: ABNORMAL
HOLD SPECIMEN: NORMAL
HOROWITZ INDEX BLD+IHG-RTO: 21 %
HYALINE CASTS UR QL AUTO: ABNORMAL /LPF
IMM GRANULOCYTES # BLD AUTO: 0.06 10*3/MM3 (ref 0–0.05)
IMM GRANULOCYTES NFR BLD AUTO: 0.5 % (ref 0–0.5)
KETONES UR QL STRIP: NEGATIVE
LEUKOCYTE ESTERASE UR QL STRIP.AUTO: ABNORMAL
LIPASE SERPL-CCNC: 22 U/L (ref 13–60)
LYMPHOCYTES # BLD AUTO: 2.44 10*3/MM3 (ref 0.7–3.1)
LYMPHOCYTES NFR BLD AUTO: 20.1 % (ref 19.6–45.3)
MCH RBC QN AUTO: 27 PG (ref 26.6–33)
MCHC RBC AUTO-ENTMCNC: 31.9 G/DL (ref 31.5–35.7)
MCV RBC AUTO: 84.6 FL (ref 79–97)
METHGB BLD QL: 0.8 %
MODALITY: ABNORMAL
MONOCYTES # BLD AUTO: 0.63 10*3/MM3 (ref 0.1–0.9)
MONOCYTES NFR BLD AUTO: 5.2 % (ref 5–12)
NEUTROPHILS # BLD AUTO: 8.78 10*3/MM3 (ref 1.7–7)
NEUTROPHILS NFR BLD AUTO: 72.4 % (ref 42.7–76)
NITRITE UR QL STRIP: NEGATIVE
NOTE: ABNORMAL
NRBC BLD AUTO-RTO: 0 /100 WBC (ref 0–0.2)
OXYHGB MFR BLDV: 78.2 %
PCO2 BLDV: 48 MM HG (ref 41–51)
PH BLDV: 7.37 PH UNITS
PH UR STRIP.AUTO: <=5 [PH] (ref 5–8)
PLATELET # BLD AUTO: 290 10*3/MM3 (ref 140–450)
PMV BLD AUTO: 12.6 FL (ref 6–12)
PO2 BLDV: 45.9 MM HG (ref 27–53)
POTASSIUM BLD-SCNC: 5 MMOL/L (ref 3.5–5.2)
PROCALCITONIN SERPL-MCNC: 0.11 NG/ML (ref 0.1–0.25)
PROT SERPL-MCNC: 7.2 G/DL (ref 6–8.5)
PROT UR QL STRIP: ABNORMAL
RBC # BLD AUTO: 5.07 10*6/MM3 (ref 4.14–5.8)
RBC # UR: ABNORMAL /HPF
REF LAB TEST METHOD: ABNORMAL
SODIUM BLD-SCNC: 126 MMOL/L (ref 136–145)
SP GR UR STRIP: >=1.03 (ref 1–1.03)
SQUAMOUS #/AREA URNS HPF: ABNORMAL /HPF
UROBILINOGEN UR QL STRIP: ABNORMAL
VENTILATOR MODE: ABNORMAL
WBC NRBC COR # BLD: 12.13 10*3/MM3 (ref 3.4–10.8)
WBC UR QL AUTO: ABNORMAL /HPF
WHOLE BLOOD HOLD SPECIMEN: NORMAL
WHOLE BLOOD HOLD SPECIMEN: NORMAL
YEAST URNS QL MICRO: ABNORMAL /HPF

## 2019-07-30 PROCEDURE — 81001 URINALYSIS AUTO W/SCOPE: CPT | Performed by: EMERGENCY MEDICINE

## 2019-07-30 PROCEDURE — 87040 BLOOD CULTURE FOR BACTERIA: CPT | Performed by: EMERGENCY MEDICINE

## 2019-07-30 PROCEDURE — 63710000001 INSULIN LISPRO (HUMAN) PER 5 UNITS: Performed by: NURSE PRACTITIONER

## 2019-07-30 PROCEDURE — 25010000002 VANCOMYCIN 1 G RECONSTITUTED SOLUTION: Performed by: EMERGENCY MEDICINE

## 2019-07-30 PROCEDURE — 99285 EMERGENCY DEPT VISIT HI MDM: CPT

## 2019-07-30 PROCEDURE — 84145 PROCALCITONIN (PCT): CPT | Performed by: EMERGENCY MEDICINE

## 2019-07-30 PROCEDURE — 80053 COMPREHEN METABOLIC PANEL: CPT | Performed by: EMERGENCY MEDICINE

## 2019-07-30 PROCEDURE — 25010000002 ONDANSETRON PER 1 MG: Performed by: EMERGENCY MEDICINE

## 2019-07-30 PROCEDURE — 83605 ASSAY OF LACTIC ACID: CPT | Performed by: EMERGENCY MEDICINE

## 2019-07-30 PROCEDURE — 87086 URINE CULTURE/COLONY COUNT: CPT | Performed by: NURSE PRACTITIONER

## 2019-07-30 PROCEDURE — 85025 COMPLETE CBC W/AUTO DIFF WBC: CPT | Performed by: EMERGENCY MEDICINE

## 2019-07-30 PROCEDURE — 82962 GLUCOSE BLOOD TEST: CPT

## 2019-07-30 PROCEDURE — 82805 BLOOD GASES W/O2 SATURATION: CPT

## 2019-07-30 PROCEDURE — 82820 HEMOGLOBIN-OXYGEN AFFINITY: CPT

## 2019-07-30 PROCEDURE — 83690 ASSAY OF LIPASE: CPT | Performed by: EMERGENCY MEDICINE

## 2019-07-30 PROCEDURE — 74177 CT ABD & PELVIS W/CONTRAST: CPT

## 2019-07-30 PROCEDURE — 0 IOPAMIDOL PER 1 ML: Performed by: EMERGENCY MEDICINE

## 2019-07-30 PROCEDURE — 99223 1ST HOSP IP/OBS HIGH 75: CPT | Performed by: FAMILY MEDICINE

## 2019-07-30 PROCEDURE — 25010000002 HYDROMORPHONE 1 MG/ML SOLUTION: Performed by: EMERGENCY MEDICINE

## 2019-07-30 PROCEDURE — 25010000002 PIPERACILLIN SOD-TAZOBACTAM PER 1 G: Performed by: EMERGENCY MEDICINE

## 2019-07-30 RX ORDER — SODIUM CHLORIDE 9 MG/ML
100 INJECTION, SOLUTION INTRAVENOUS CONTINUOUS
Status: DISCONTINUED | OUTPATIENT
Start: 2019-07-30 | End: 2019-08-02

## 2019-07-30 RX ORDER — DEXTROSE MONOHYDRATE 25 G/50ML
25 INJECTION, SOLUTION INTRAVENOUS
Status: DISCONTINUED | OUTPATIENT
Start: 2019-07-30 | End: 2019-08-14 | Stop reason: HOSPADM

## 2019-07-30 RX ORDER — ONDANSETRON 2 MG/ML
4 INJECTION INTRAMUSCULAR; INTRAVENOUS ONCE
Status: COMPLETED | OUTPATIENT
Start: 2019-07-30 | End: 2019-07-30

## 2019-07-30 RX ORDER — SODIUM CHLORIDE 0.9 % (FLUSH) 0.9 %
3-10 SYRINGE (ML) INJECTION AS NEEDED
Status: DISCONTINUED | OUTPATIENT
Start: 2019-07-30 | End: 2019-08-02

## 2019-07-30 RX ORDER — TAMSULOSIN HYDROCHLORIDE 0.4 MG/1
0.8 CAPSULE ORAL NIGHTLY
Status: DISCONTINUED | OUTPATIENT
Start: 2019-07-30 | End: 2019-08-14 | Stop reason: HOSPADM

## 2019-07-30 RX ORDER — ONDANSETRON 4 MG/1
4 TABLET, FILM COATED ORAL EVERY 6 HOURS PRN
Status: DISCONTINUED | OUTPATIENT
Start: 2019-07-30 | End: 2019-08-14 | Stop reason: HOSPADM

## 2019-07-30 RX ORDER — SODIUM CHLORIDE 0.9 % (FLUSH) 0.9 %
10 SYRINGE (ML) INJECTION AS NEEDED
Status: DISCONTINUED | OUTPATIENT
Start: 2019-07-30 | End: 2019-08-14 | Stop reason: HOSPADM

## 2019-07-30 RX ORDER — DULOXETIN HYDROCHLORIDE 30 MG/1
30 CAPSULE, DELAYED RELEASE ORAL DAILY
Status: DISCONTINUED | OUTPATIENT
Start: 2019-07-31 | End: 2019-08-14 | Stop reason: HOSPADM

## 2019-07-30 RX ORDER — NICOTINE POLACRILEX 4 MG
15 LOZENGE BUCCAL
Status: DISCONTINUED | OUTPATIENT
Start: 2019-07-30 | End: 2019-08-14 | Stop reason: HOSPADM

## 2019-07-30 RX ORDER — LISINOPRIL 40 MG/1
40 TABLET ORAL DAILY
COMMUNITY
End: 2019-08-14 | Stop reason: HOSPADM

## 2019-07-30 RX ORDER — LISINOPRIL 10 MG/1
40 TABLET ORAL DAILY
Status: DISCONTINUED | OUTPATIENT
Start: 2019-07-31 | End: 2019-08-04

## 2019-07-30 RX ORDER — ONDANSETRON 2 MG/ML
4 INJECTION INTRAMUSCULAR; INTRAVENOUS EVERY 6 HOURS PRN
Status: DISCONTINUED | OUTPATIENT
Start: 2019-07-30 | End: 2019-08-14 | Stop reason: HOSPADM

## 2019-07-30 RX ORDER — GABAPENTIN 400 MG/1
1200 CAPSULE ORAL 3 TIMES DAILY
Status: DISCONTINUED | OUTPATIENT
Start: 2019-07-30 | End: 2019-08-04

## 2019-07-30 RX ORDER — SODIUM CHLORIDE 0.9 % (FLUSH) 0.9 %
3 SYRINGE (ML) INJECTION EVERY 12 HOURS SCHEDULED
Status: DISCONTINUED | OUTPATIENT
Start: 2019-07-30 | End: 2019-08-02

## 2019-07-30 RX ORDER — HYDROCODONE BITARTRATE AND ACETAMINOPHEN 10; 325 MG/1; MG/1
1 TABLET ORAL EVERY 6 HOURS PRN
Status: DISCONTINUED | OUTPATIENT
Start: 2019-07-30 | End: 2019-08-14 | Stop reason: HOSPADM

## 2019-07-30 RX ORDER — SACCHAROMYCES BOULARDII 250 MG
250 CAPSULE ORAL 2 TIMES DAILY
Status: DISCONTINUED | OUTPATIENT
Start: 2019-07-30 | End: 2019-08-14 | Stop reason: HOSPADM

## 2019-07-30 RX ADMIN — SODIUM CHLORIDE 1000 ML: 9 INJECTION, SOLUTION INTRAVENOUS at 18:53

## 2019-07-30 RX ADMIN — METOPROLOL TARTRATE 75 MG: 50 TABLET ORAL at 22:27

## 2019-07-30 RX ADMIN — SODIUM CHLORIDE 1000 ML: 9 INJECTION, SOLUTION INTRAVENOUS at 17:04

## 2019-07-30 RX ADMIN — HYDROMORPHONE HYDROCHLORIDE 1 MG: 1 INJECTION, SOLUTION INTRAMUSCULAR; INTRAVENOUS; SUBCUTANEOUS at 17:09

## 2019-07-30 RX ADMIN — VANCOMYCIN HYDROCHLORIDE 2250 MG: 1 INJECTION, POWDER, LYOPHILIZED, FOR SOLUTION INTRAVENOUS at 18:55

## 2019-07-30 RX ADMIN — INSULIN LISPRO 9 UNITS: 100 INJECTION, SOLUTION INTRAVENOUS; SUBCUTANEOUS at 22:30

## 2019-07-30 RX ADMIN — IOPAMIDOL 100 ML: 755 INJECTION, SOLUTION INTRAVENOUS at 17:42

## 2019-07-30 RX ADMIN — ONDANSETRON 4 MG: 2 INJECTION INTRAMUSCULAR; INTRAVENOUS at 17:06

## 2019-07-30 RX ADMIN — TAMSULOSIN HYDROCHLORIDE 0.8 MG: 0.4 CAPSULE ORAL at 22:28

## 2019-07-30 RX ADMIN — TAZOBACTAM SODIUM AND PIPERACILLIN SODIUM 3.38 G: 375; 3 INJECTION, SOLUTION INTRAVENOUS at 18:54

## 2019-07-30 RX ADMIN — GABAPENTIN 1200 MG: 400 CAPSULE ORAL at 22:27

## 2019-07-30 RX ADMIN — HYDROCODONE BITARTRATE AND ACETAMINOPHEN 1 TABLET: 10; 325 TABLET ORAL at 22:28

## 2019-07-30 RX ADMIN — SODIUM CHLORIDE 100 ML/HR: 9 INJECTION, SOLUTION INTRAVENOUS at 23:23

## 2019-07-30 RX ADMIN — Medication 250 MG: at 22:28

## 2019-07-31 LAB
ANION GAP SERPL CALCULATED.3IONS-SCNC: 13 MMOL/L (ref 5–15)
APTT PPP: 29.7 SECONDS (ref 24–37)
BACTERIA SPEC AEROBE CULT: NO GROWTH
BASOPHILS # BLD AUTO: 0.07 10*3/MM3 (ref 0–0.2)
BASOPHILS NFR BLD AUTO: 0.6 % (ref 0–1.5)
BUN BLD-MCNC: 19 MG/DL (ref 6–20)
BUN/CREAT SERPL: 23.2 (ref 7–25)
CALCIUM SPEC-SCNC: 8.3 MG/DL (ref 8.6–10.5)
CHLORIDE SERPL-SCNC: 94 MMOL/L (ref 98–107)
CO2 SERPL-SCNC: 24 MMOL/L (ref 22–29)
CREAT BLD-MCNC: 0.82 MG/DL (ref 0.76–1.27)
DEPRECATED RDW RBC AUTO: 44.1 FL (ref 37–54)
EOSINOPHIL # BLD AUTO: 0.16 10*3/MM3 (ref 0–0.4)
EOSINOPHIL NFR BLD AUTO: 1.4 % (ref 0.3–6.2)
ERYTHROCYTE [DISTWIDTH] IN BLOOD BY AUTOMATED COUNT: 14.6 % (ref 12.3–15.4)
GFR SERPL CREATININE-BSD FRML MDRD: 99 ML/MIN/1.73
GLUCOSE BLD-MCNC: 510 MG/DL (ref 65–99)
GLUCOSE BLDC GLUCOMTR-MCNC: 256 MG/DL (ref 70–130)
GLUCOSE BLDC GLUCOMTR-MCNC: 348 MG/DL (ref 70–130)
GLUCOSE BLDC GLUCOMTR-MCNC: 368 MG/DL (ref 70–130)
GLUCOSE BLDC GLUCOMTR-MCNC: 399 MG/DL (ref 70–130)
GLUCOSE BLDC GLUCOMTR-MCNC: 415 MG/DL (ref 70–130)
GLUCOSE BLDC GLUCOMTR-MCNC: 531 MG/DL (ref 70–130)
HBA1C MFR BLD: 14.1 % (ref 4.8–5.6)
HCT VFR BLD AUTO: 38.1 % (ref 37.5–51)
HGB BLD-MCNC: 12.4 G/DL (ref 13–17.7)
IMM GRANULOCYTES # BLD AUTO: 0.06 10*3/MM3 (ref 0–0.05)
IMM GRANULOCYTES NFR BLD AUTO: 0.5 % (ref 0–0.5)
INR PPP: 1.02 (ref 0.85–1.16)
LYMPHOCYTES # BLD AUTO: 2.33 10*3/MM3 (ref 0.7–3.1)
LYMPHOCYTES NFR BLD AUTO: 20.7 % (ref 19.6–45.3)
MAGNESIUM SERPL-MCNC: 1.9 MG/DL (ref 1.6–2.6)
MCH RBC QN AUTO: 27.4 PG (ref 26.6–33)
MCHC RBC AUTO-ENTMCNC: 32.5 G/DL (ref 31.5–35.7)
MCV RBC AUTO: 84.1 FL (ref 79–97)
MONOCYTES # BLD AUTO: 0.9 10*3/MM3 (ref 0.1–0.9)
MONOCYTES NFR BLD AUTO: 8 % (ref 5–12)
NEUTROPHILS # BLD AUTO: 7.71 10*3/MM3 (ref 1.7–7)
NEUTROPHILS NFR BLD AUTO: 68.8 % (ref 42.7–76)
NRBC BLD AUTO-RTO: 0 /100 WBC (ref 0–0.2)
PLATELET # BLD AUTO: 235 10*3/MM3 (ref 140–450)
PMV BLD AUTO: 12.2 FL (ref 6–12)
POTASSIUM BLD-SCNC: 4.5 MMOL/L (ref 3.5–5.2)
PROTHROMBIN TIME: 12.9 SECONDS (ref 11.2–14.3)
RBC # BLD AUTO: 4.53 10*6/MM3 (ref 4.14–5.8)
SODIUM BLD-SCNC: 131 MMOL/L (ref 136–145)
WBC NRBC COR # BLD: 11.23 10*3/MM3 (ref 3.4–10.8)

## 2019-07-31 PROCEDURE — 85025 COMPLETE CBC W/AUTO DIFF WBC: CPT | Performed by: NURSE PRACTITIONER

## 2019-07-31 PROCEDURE — 93005 ELECTROCARDIOGRAM TRACING: CPT | Performed by: NURSE PRACTITIONER

## 2019-07-31 PROCEDURE — 25010000003 MICAFUNGIN SODIUM 100 MG RECONSTITUTED SOLUTION: Performed by: INTERNAL MEDICINE

## 2019-07-31 PROCEDURE — 83036 HEMOGLOBIN GLYCOSYLATED A1C: CPT | Performed by: NURSE PRACTITIONER

## 2019-07-31 PROCEDURE — 25010000002 PIPERACILLIN SOD-TAZOBACTAM PER 1 G: Performed by: NURSE PRACTITIONER

## 2019-07-31 PROCEDURE — 93010 ELECTROCARDIOGRAM REPORT: CPT | Performed by: INTERNAL MEDICINE

## 2019-07-31 PROCEDURE — 80048 BASIC METABOLIC PNL TOTAL CA: CPT | Performed by: NURSE PRACTITIONER

## 2019-07-31 PROCEDURE — G0108 DIAB MANAGE TRN  PER INDIV: HCPCS

## 2019-07-31 PROCEDURE — 85730 THROMBOPLASTIN TIME PARTIAL: CPT | Performed by: NURSE PRACTITIONER

## 2019-07-31 PROCEDURE — 25010000002 VANCOMYCIN 10 G RECONSTITUTED SOLUTION: Performed by: FAMILY MEDICINE

## 2019-07-31 PROCEDURE — 99233 SBSQ HOSP IP/OBS HIGH 50: CPT | Performed by: INTERNAL MEDICINE

## 2019-07-31 PROCEDURE — 63710000001 INSULIN LISPRO (HUMAN) PER 5 UNITS: Performed by: INTERNAL MEDICINE

## 2019-07-31 PROCEDURE — 82962 GLUCOSE BLOOD TEST: CPT

## 2019-07-31 PROCEDURE — 85610 PROTHROMBIN TIME: CPT | Performed by: NURSE PRACTITIONER

## 2019-07-31 PROCEDURE — 83735 ASSAY OF MAGNESIUM: CPT | Performed by: NURSE PRACTITIONER

## 2019-07-31 PROCEDURE — 63710000001 INSULIN DETEMIR PER 5 UNITS: Performed by: INTERNAL MEDICINE

## 2019-07-31 RX ORDER — FUROSEMIDE 40 MG/1
40 TABLET ORAL DAILY
COMMUNITY
End: 2019-08-14 | Stop reason: HOSPADM

## 2019-07-31 RX ADMIN — INSULIN LISPRO 9 UNITS: 100 INJECTION, SOLUTION INTRAVENOUS; SUBCUTANEOUS at 06:45

## 2019-07-31 RX ADMIN — GABAPENTIN 1200 MG: 400 CAPSULE ORAL at 20:14

## 2019-07-31 RX ADMIN — VANCOMYCIN HYDROCHLORIDE 1750 MG: 10 INJECTION, POWDER, LYOPHILIZED, FOR SOLUTION INTRAVENOUS at 10:35

## 2019-07-31 RX ADMIN — LISINOPRIL 40 MG: 10 TABLET ORAL at 10:36

## 2019-07-31 RX ADMIN — INSULIN LISPRO 7 UNITS: 100 INJECTION, SOLUTION INTRAVENOUS; SUBCUTANEOUS at 20:14

## 2019-07-31 RX ADMIN — HYDROCODONE BITARTRATE AND ACETAMINOPHEN 1 TABLET: 10; 325 TABLET ORAL at 13:58

## 2019-07-31 RX ADMIN — Medication 250 MG: at 20:14

## 2019-07-31 RX ADMIN — INSULIN DETEMIR 20 UNITS: 100 INJECTION, SOLUTION SUBCUTANEOUS at 10:38

## 2019-07-31 RX ADMIN — INSULIN LISPRO 8 UNITS: 100 INJECTION, SOLUTION INTRAVENOUS; SUBCUTANEOUS at 10:38

## 2019-07-31 RX ADMIN — GABAPENTIN 1200 MG: 400 CAPSULE ORAL at 16:06

## 2019-07-31 RX ADMIN — DULOXETINE HYDROCHLORIDE 30 MG: 30 CAPSULE, DELAYED RELEASE ORAL at 10:36

## 2019-07-31 RX ADMIN — Medication 250 MG: at 10:37

## 2019-07-31 RX ADMIN — MICAFUNGIN SODIUM 100 MG: 20 INJECTION, POWDER, LYOPHILIZED, FOR SOLUTION INTRAVENOUS at 16:06

## 2019-07-31 RX ADMIN — INSULIN LISPRO 6 UNITS: 100 INJECTION, SOLUTION INTRAVENOUS; SUBCUTANEOUS at 18:00

## 2019-07-31 RX ADMIN — SODIUM CHLORIDE, PRESERVATIVE FREE 3 ML: 5 INJECTION INTRAVENOUS at 20:19

## 2019-07-31 RX ADMIN — INSULIN LISPRO 7 UNITS: 100 INJECTION, SOLUTION INTRAVENOUS; SUBCUTANEOUS at 12:46

## 2019-07-31 RX ADMIN — TAZOBACTAM SODIUM AND PIPERACILLIN SODIUM 3.38 G: 375; 3 INJECTION, SOLUTION INTRAVENOUS at 00:36

## 2019-07-31 RX ADMIN — INSULIN LISPRO 9 UNITS: 100 INJECTION, SOLUTION INTRAVENOUS; SUBCUTANEOUS at 12:46

## 2019-07-31 RX ADMIN — METOPROLOL TARTRATE 75 MG: 50 TABLET ORAL at 10:37

## 2019-07-31 RX ADMIN — TAMSULOSIN HYDROCHLORIDE 0.8 MG: 0.4 CAPSULE ORAL at 20:14

## 2019-07-31 RX ADMIN — INSULIN DETEMIR 20 UNITS: 100 INJECTION, SOLUTION SUBCUTANEOUS at 20:20

## 2019-07-31 RX ADMIN — TAZOBACTAM SODIUM AND PIPERACILLIN SODIUM 3.38 G: 375; 3 INJECTION, SOLUTION INTRAVENOUS at 10:36

## 2019-07-31 RX ADMIN — TAZOBACTAM SODIUM AND PIPERACILLIN SODIUM 3.38 G: 375; 3 INJECTION, SOLUTION INTRAVENOUS at 17:59

## 2019-07-31 RX ADMIN — VANCOMYCIN HYDROCHLORIDE 1750 MG: 10 INJECTION, POWDER, LYOPHILIZED, FOR SOLUTION INTRAVENOUS at 22:02

## 2019-07-31 RX ADMIN — GABAPENTIN 1200 MG: 400 CAPSULE ORAL at 10:37

## 2019-07-31 RX ADMIN — METOPROLOL TARTRATE 75 MG: 50 TABLET ORAL at 20:14

## 2019-07-31 RX ADMIN — SODIUM CHLORIDE 100 ML/HR: 9 INJECTION, SOLUTION INTRAVENOUS at 23:10

## 2019-07-31 RX ADMIN — INSULIN LISPRO 7 UNITS: 100 INJECTION, SOLUTION INTRAVENOUS; SUBCUTANEOUS at 17:59

## 2019-08-01 ENCOUNTER — HOSPITAL ENCOUNTER (OUTPATIENT)
Facility: HOSPITAL | Age: 49
Setting detail: HOSPITAL OUTPATIENT SURGERY
End: 2019-08-01
Attending: COLON & RECTAL SURGERY | Admitting: COLON & RECTAL SURGERY

## 2019-08-01 LAB
ALBUMIN SERPL-MCNC: 2.7 G/DL (ref 3.5–5.2)
ALBUMIN/GLOB SERPL: 0.8 G/DL
ALP SERPL-CCNC: 155 U/L (ref 39–117)
ALT SERPL W P-5'-P-CCNC: 21 U/L (ref 1–41)
ANION GAP SERPL CALCULATED.3IONS-SCNC: 12 MMOL/L (ref 5–15)
AST SERPL-CCNC: 22 U/L (ref 1–40)
BASOPHILS # BLD AUTO: 0.08 10*3/MM3 (ref 0–0.2)
BASOPHILS NFR BLD AUTO: 0.7 % (ref 0–1.5)
BILIRUB SERPL-MCNC: 0.2 MG/DL (ref 0.2–1.2)
BUN BLD-MCNC: 23 MG/DL (ref 6–20)
BUN/CREAT SERPL: 29.1 (ref 7–25)
CALCIUM SPEC-SCNC: 8.1 MG/DL (ref 8.6–10.5)
CHLORIDE SERPL-SCNC: 96 MMOL/L (ref 98–107)
CO2 SERPL-SCNC: 21 MMOL/L (ref 22–29)
CREAT BLD-MCNC: 0.79 MG/DL (ref 0.76–1.27)
DEPRECATED RDW RBC AUTO: 45.3 FL (ref 37–54)
EOSINOPHIL # BLD AUTO: 0.22 10*3/MM3 (ref 0–0.4)
EOSINOPHIL NFR BLD AUTO: 2 % (ref 0.3–6.2)
ERYTHROCYTE [DISTWIDTH] IN BLOOD BY AUTOMATED COUNT: 14.6 % (ref 12.3–15.4)
GFR SERPL CREATININE-BSD FRML MDRD: 104 ML/MIN/1.73
GLOBULIN UR ELPH-MCNC: 3.2 GM/DL
GLUCOSE BLD-MCNC: 564 MG/DL (ref 65–99)
GLUCOSE BLDC GLUCOMTR-MCNC: 349 MG/DL (ref 70–130)
GLUCOSE BLDC GLUCOMTR-MCNC: 408 MG/DL (ref 70–130)
GLUCOSE BLDC GLUCOMTR-MCNC: 421 MG/DL (ref 70–130)
GLUCOSE BLDC GLUCOMTR-MCNC: 494 MG/DL (ref 70–130)
HCT VFR BLD AUTO: 37.6 % (ref 37.5–51)
HGB BLD-MCNC: 11.9 G/DL (ref 13–17.7)
IMM GRANULOCYTES # BLD AUTO: 0.05 10*3/MM3 (ref 0–0.05)
IMM GRANULOCYTES NFR BLD AUTO: 0.4 % (ref 0–0.5)
LYMPHOCYTES # BLD AUTO: 2.11 10*3/MM3 (ref 0.7–3.1)
LYMPHOCYTES NFR BLD AUTO: 18.7 % (ref 19.6–45.3)
MCH RBC QN AUTO: 27 PG (ref 26.6–33)
MCHC RBC AUTO-ENTMCNC: 31.6 G/DL (ref 31.5–35.7)
MCV RBC AUTO: 85.5 FL (ref 79–97)
MONOCYTES # BLD AUTO: 0.79 10*3/MM3 (ref 0.1–0.9)
MONOCYTES NFR BLD AUTO: 7 % (ref 5–12)
NEUTROPHILS # BLD AUTO: 8.02 10*3/MM3 (ref 1.7–7)
NEUTROPHILS NFR BLD AUTO: 71.2 % (ref 42.7–76)
NRBC BLD AUTO-RTO: 0 /100 WBC (ref 0–0.2)
PLATELET # BLD AUTO: 236 10*3/MM3 (ref 140–450)
PMV BLD AUTO: 12.4 FL (ref 6–12)
POTASSIUM BLD-SCNC: 4.7 MMOL/L (ref 3.5–5.2)
PROT SERPL-MCNC: 5.9 G/DL (ref 6–8.5)
RBC # BLD AUTO: 4.4 10*6/MM3 (ref 4.14–5.8)
SODIUM BLD-SCNC: 129 MMOL/L (ref 136–145)
VANCOMYCIN TROUGH SERPL-MCNC: 19.2 MCG/ML (ref 5–20)
WBC NRBC COR # BLD: 11.27 10*3/MM3 (ref 3.4–10.8)

## 2019-08-01 PROCEDURE — 25010000002 VANCOMYCIN 10 G RECONSTITUTED SOLUTION

## 2019-08-01 PROCEDURE — 63710000001 INSULIN LISPRO (HUMAN) PER 5 UNITS: Performed by: INTERNAL MEDICINE

## 2019-08-01 PROCEDURE — 85025 COMPLETE CBC W/AUTO DIFF WBC: CPT | Performed by: INTERNAL MEDICINE

## 2019-08-01 PROCEDURE — 99233 SBSQ HOSP IP/OBS HIGH 50: CPT | Performed by: INTERNAL MEDICINE

## 2019-08-01 PROCEDURE — 63710000001 INSULIN DETEMIR PER 5 UNITS: Performed by: INTERNAL MEDICINE

## 2019-08-01 PROCEDURE — 80053 COMPREHEN METABOLIC PANEL: CPT | Performed by: INTERNAL MEDICINE

## 2019-08-01 PROCEDURE — 82962 GLUCOSE BLOOD TEST: CPT

## 2019-08-01 PROCEDURE — 80202 ASSAY OF VANCOMYCIN: CPT | Performed by: FAMILY MEDICINE

## 2019-08-01 PROCEDURE — 25010000002 PIPERACILLIN SOD-TAZOBACTAM PER 1 G: Performed by: NURSE PRACTITIONER

## 2019-08-01 RX ADMIN — INSULIN DETEMIR 20 UNITS: 100 INJECTION, SOLUTION SUBCUTANEOUS at 08:07

## 2019-08-01 RX ADMIN — INSULIN LISPRO 9 UNITS: 100 INJECTION, SOLUTION INTRAVENOUS; SUBCUTANEOUS at 08:09

## 2019-08-01 RX ADMIN — VANCOMYCIN HYDROCHLORIDE 1500 MG: 10 INJECTION, POWDER, LYOPHILIZED, FOR SOLUTION INTRAVENOUS at 10:30

## 2019-08-01 RX ADMIN — Medication 250 MG: at 10:29

## 2019-08-01 RX ADMIN — INSULIN LISPRO 5 UNITS: 100 INJECTION, SOLUTION INTRAVENOUS; SUBCUTANEOUS at 21:14

## 2019-08-01 RX ADMIN — SODIUM CHLORIDE 100 ML/HR: 9 INJECTION, SOLUTION INTRAVENOUS at 10:29

## 2019-08-01 RX ADMIN — GABAPENTIN 1200 MG: 400 CAPSULE ORAL at 17:05

## 2019-08-01 RX ADMIN — INSULIN LISPRO 9 UNITS: 100 INJECTION, SOLUTION INTRAVENOUS; SUBCUTANEOUS at 18:05

## 2019-08-01 RX ADMIN — MICAFUNGIN SODIUM 100 MG: 20 INJECTION, POWDER, LYOPHILIZED, FOR SOLUTION INTRAVENOUS at 15:26

## 2019-08-01 RX ADMIN — TAZOBACTAM SODIUM AND PIPERACILLIN SODIUM 3.38 G: 375; 3 INJECTION, SOLUTION INTRAVENOUS at 18:15

## 2019-08-01 RX ADMIN — INSULIN LISPRO 7 UNITS: 100 INJECTION, SOLUTION INTRAVENOUS; SUBCUTANEOUS at 21:18

## 2019-08-01 RX ADMIN — GABAPENTIN 1200 MG: 400 CAPSULE ORAL at 08:07

## 2019-08-01 RX ADMIN — Medication 250 MG: at 21:18

## 2019-08-01 RX ADMIN — TAZOBACTAM SODIUM AND PIPERACILLIN SODIUM 3.38 G: 375; 3 INJECTION, SOLUTION INTRAVENOUS at 11:09

## 2019-08-01 RX ADMIN — GABAPENTIN 1200 MG: 400 CAPSULE ORAL at 21:13

## 2019-08-01 RX ADMIN — SODIUM CHLORIDE 100 ML/HR: 9 INJECTION, SOLUTION INTRAVENOUS at 10:17

## 2019-08-01 RX ADMIN — HYDROCODONE BITARTRATE AND ACETAMINOPHEN 1 TABLET: 10; 325 TABLET ORAL at 00:06

## 2019-08-01 RX ADMIN — METOPROLOL TARTRATE 75 MG: 50 TABLET ORAL at 21:14

## 2019-08-01 RX ADMIN — VANCOMYCIN HYDROCHLORIDE 1500 MG: 10 INJECTION, POWDER, LYOPHILIZED, FOR SOLUTION INTRAVENOUS at 21:28

## 2019-08-01 RX ADMIN — SODIUM CHLORIDE, PRESERVATIVE FREE 3 ML: 5 INJECTION INTRAVENOUS at 21:19

## 2019-08-01 RX ADMIN — TAZOBACTAM SODIUM AND PIPERACILLIN SODIUM 3.38 G: 375; 3 INJECTION, SOLUTION INTRAVENOUS at 01:34

## 2019-08-01 RX ADMIN — LISINOPRIL 40 MG: 10 TABLET ORAL at 08:09

## 2019-08-01 RX ADMIN — METOPROLOL TARTRATE 75 MG: 50 TABLET ORAL at 08:08

## 2019-08-01 RX ADMIN — DULOXETINE HYDROCHLORIDE 30 MG: 30 CAPSULE, DELAYED RELEASE ORAL at 08:09

## 2019-08-01 RX ADMIN — INSULIN LISPRO 9 UNITS: 100 INJECTION, SOLUTION INTRAVENOUS; SUBCUTANEOUS at 12:13

## 2019-08-01 RX ADMIN — INSULIN DETEMIR 30 UNITS: 100 INJECTION, SOLUTION SUBCUTANEOUS at 23:13

## 2019-08-01 RX ADMIN — INSULIN LISPRO 6 UNITS: 100 INJECTION, SOLUTION INTRAVENOUS; SUBCUTANEOUS at 10:20

## 2019-08-01 RX ADMIN — INSULIN LISPRO 5 UNITS: 100 INJECTION, SOLUTION INTRAVENOUS; SUBCUTANEOUS at 18:06

## 2019-08-01 RX ADMIN — TAMSULOSIN HYDROCHLORIDE 0.8 MG: 0.4 CAPSULE ORAL at 21:13

## 2019-08-01 NOTE — PROGRESS NOTES
[facility] Progress Note        Antibiotics:  Anti-Infectives (From admission, onward)    Ordered     Dose/Rate Route Frequency Start Stop    19 0844  vancomycin 1500 mg/500 mL 0.9% NS IVPB ([facility])     Ordering Provider:  Shayy Noble RPH    1,500 mg  over 90 Minutes Intravenous Every 12 Hours 19 0930 19 0929    19 0955  micafungin 100 mg/100 mL 0.9% NS IVPB ([facility])     Ordering Provider:  Usman Dong MD    100 mg  over 60 Minutes Intravenous Every 24 Hours 19 1100 08/10/19 1059    19  piperacillin-tazobactam (ZOSYN) 3.375 g in iso-osmotic dextrose 50 ml (premix)     Comments:  First dose given in ED.   Ordering Provider:  Keyanna Aponte APRN    3.375 g  over 4 Hours Intravenous Every 8 Hours 19 0100 19 0059    19 205  Pharmacy to dose vancomycin     Ordering Provider:  Keyanna Aponte APRN     Does not apply Continuous PRN 19 2047 19 2046    19 1820  vancomycin 2250 mg/500 mL 0.9% NS IVPB ([facility])     Ordering Provider:  Kendrick Sotelo MD    20 mg/kg × 113 kg  over 3 Hours Intravenous Once 19 1822 19 2155    19 1820  piperacillin-tazobactam (ZOSYN) 3.375 g in iso-osmotic dextrose 50 ml (premix)     Ordering Provider:  Kendrick Sotelo MD    3.375 g  over 30 Minutes Intravenous Once 19 1822 19 1924          CC: rectal pain    HPI:    Patient is a 48 y.o.  Yr old male with history of poorly contolled T2DM, DUNLAP/liver cirrhosis, HTN, PVD/Left BKA, and multiple skin abscesses/MRSA who presented to [facility] ED with right shin wound infection summer 2018.  He was unable to get to see Dr. Martinez as his father passed away unexpectedly on 18 and he had to wait until after the .  The wound worsened becoming gangrenous and he came to [facility] ED in 2018.  He also had been hospitalized at [facility] and then transferred to  for a severe penis/scrotum infection requiring surgical  debridement in summer 2018.  He received antibiotics regarding his right leg infection, generally improved over the remainder of summer 2018 but that area had not completely healed. He was admitted April 24, 2019 with acute left lower abdominal wall redness/swelling and pain, spontaneous drainage associated with fever/chills and uncontrolled blood sugars.  4/26 I&D requiring wide debridement , severe/deep infection and transferred to [facility] on May 3 with IV Zosyn/oral doxycycline. Cultures had lactobacillus and mixed gram-positive skin sherly including alpha strep, staph epidermidis and corynebacterium. Readmitted to [facility] May 18, 2019, increasing generalized edema ultimately transitioned to oral doxycycline and healed.     He presented to the emergency room July 30, 2019 with acute rectal pain that had begun 7/27; this is associated with constipation for days, chills and nausea/dry heaving.  White blood cell count elevated, high hemoglobin A1c over 13, urinalysis with hematuria/pyuria and CT scan with 3 x 3 cm fluid collection anterior to the distal rectum and per discussion with Dr. Akbar/Jennifer, this is not in the prostate.  Colorectal surgery/urology saw him for consideration of surgical options/timing/threshold, etc..     8/1/19 He reports some perirectal pain , currently dull, nonradiating, better with pain meds, 3 out of 10 at present.  He denies hematochezia melena or hematemesis.  No diarrhea.  Constipation as above.  No dysuria hematuria or pyuria.  No hesitancy urgency or flank pain at present.  Prior history urinary retention per his reports.     No headache photophobia or neck stiffness.  No shortness of breath cough or hemoptysis.  No skin rash.       ROS:      8/1/19 No f/c/s. No n/v/d. No rash. No new ADR to Abx.       Constitutional--appetite diminished with malaise.  No sweats   Heent-- No new vision, hearing or throat complaints.  No epistaxis or oral sores.  Denies  "odynophagia or dysphagia.  No flashers, floaters or eye pain. No odynophagia or dysphagia. No headache, photophobia or neck stiffness.  CV-- No chest pain, palpitation or syncope  Resp-- No SOB/cough/Hemoptysis  GI- No  hematochezia, melena, or hematemesis. Denies jaundice ; he has chronic liver disease  --as above.  No dysuria, hematuria, or flank pain.  Denies hesitancy, urgency or flank pain.  Lymph- no swollen lymph nodes in neck/axilla or groin.   Heme- No active bruising or bleeding; no Hx of DVT or PE.  MS-- no swelling or pain in the bones or joints of arms/legs.  No new back pain.  Neuro-- No acute focal weakness or numbness in the arms or legs.  No seizures.     Full 12 point review of systems reviewed and negative otherwise for acute complaints, except for above          PE:   /97   Pulse 86   Temp 97.8 °F (36.6 °C) (Oral)   Resp 19   Ht 190.5 cm (75\")   Wt 113 kg (250 lb)   SpO2 94%   BMI 31.25 kg/m²       GENERAL: Awake and alert, in no acute distress.   HEENT: Normocephalic, atraumatic.  PERRL. EOMI. No conjunctival injection. No icterus. Oropharynx clear without evidence of thrush or exudate. No evidence of peridontal disease.    NECK: Supple without nuchal rigidity. No mass.  LYMPH: No cervical, axillary or inguinal lymphadenopathy.  HEART: RRR; No murmur, rubs, gallops.   LUNGS: Clear to auscultation bilaterally without wheezing, rales, rhonchi. Normal respiratory effort. Nonlabored. No dullness.  ABDOMEN: Soft, nontender, nondistended. Positive bowel sounds. No rebound or guarding. NO mass or HSM.  EXT:  No cyanosis, clubbing or edema. No cord.  : Genitalia generally unremarkable.  Unable to elicit any perineal tenderness.  No discrete mass bulge or fluctuance.  No crepitus or bulla.  I am unable to elicit any perianal tenderness with no obvious bulge or fluctuance there either.  Slight candidiasis  MSK: FROM without joint effusions noted arms/legs.    SKIN: Warm and dry without " cutaneous eruptions on Inspection/palpation.    NEURO: Oriented to PPT. No focal deficits on motor/sensory exam at arms/legs.     No peripheral stigmata/phenomena of endocarditis           Laboratory Data    Results from last 7 days   Lab Units 08/01/19  0656 07/31/19  0506 07/30/19  1631   WBC 10*3/mm3 11.27* 11.23* 12.13*   HEMOGLOBIN g/dL 11.9* 12.4* 13.7   HEMATOCRIT % 37.6 38.1 42.9   PLATELETS 10*3/mm3 236 235 290     Results from last 7 days   Lab Units 08/01/19  0656   SODIUM mmol/L 129*   POTASSIUM mmol/L 4.7   CHLORIDE mmol/L 96*   CO2 mmol/L 21.0*   BUN mg/dL 23*   CREATININE mg/dL 0.79   GLUCOSE mg/dL 564*   CALCIUM mg/dL 8.1*     Results from last 7 days   Lab Units 08/01/19  0656   ALK PHOS U/L 155*   BILIRUBIN mg/dL 0.2   ALT (SGPT) U/L 21   AST (SGOT) U/L 22               Estimated Creatinine Clearance: 151.7 mL/min (by C-G formula based on SCr of 0.79 mg/dL).      Microbiology:      Radiology:  Imaging Results (last 72 hours)     Procedure Component Value Units Date/Time    CT Abdomen Pelvis With Contrast [[id number]] Collected:  07/30/19 1749     Updated:  07/31/19 1034    Narrative:       EXAMINATION: CT ABDOMEN PELVIS W CONTRAST- 07/30/2019      INDICATION: lower abd pain     TECHNIQUE: CT scan of the abdomen and pelvis was performed with  intravenous contrast.     The radiation dose reduction device was turned on for each scan per the  ALARA (As Low as Reasonably Achievable) protocol.     COMPARISON: 05/30/2019     FINDINGS: The most superior images demonstrate no basilar inflammatory  inflammatory process or pleural effusion. Small effusions have resolved  since previous examination. The liver and spleen are normal. There are  small bilateral fat-containing adrenal nodules consistent bilateral  myolipomas, unchanged. The pancreas is normal. There is no renal mass,  stone or obstruction. There is no ascites,  aneurysm or retroperitoneal  lymphadenopathy. There is diffuse thickening of the bladder  wall,  probably related to the peroneal abscess. There is no inguinal  lymphadenopathy. There are sigmoid diverticula without features of  diverticulitis. The appendix is normal.     Just inferior to the prostate gland and anterior to the distal rectum  there is a 3.0 x 3.2 cm fluid collection with enhancing margins  consistent with an abscess.       Impression:       There is a 3.0 x 3.2 cm fluid collection with enhancing  margins consistent with an abscess related to the inferior aspect of the  prostate gland and anterior to the distal rectum.     D:  07/30/2019  E:  07/31/2019     This report was finalized on 7/31/2019 10:31 AM by Dr. Guillermo Akbar MD.               Impression:     --Acute sepsis per internal medicine admission note, concern for abscess in the perirectal tissue as etiology.  Empiric antibiotics ongoing with risk for mixed infection.  I discussed the case with Dr. Payne and Dr Akbar and Dr Rodriguez has seen; as of 7/31, consensus from these three was no option for drainage;  Dr Payne following closely to determine timing/options for drainage; they are going to help clarify if any specific option for drainage.  If this is an abscess, then Drainage of abscess will certainly give best chance for cure.  Patient/mom understand that undrained abscess has increased risk for persistence/progression or relapse infection and that antibiotics alone are not a guarantee for cure.  Drainage could also help facilitate microbiologic diagnosis. Dr Payne/mom aware.  If unable to drain, then consideration would need to be given to antibiotics alone and close observation by surgery.  Radiology reports this does not directly involve the prostate, this is not directly involving the perineal soft tissue per them, and there is no evidence for necrotizing soft tissue infection by exam at present.  Chest exam clear at present.  Urine culture pending to help rule out associated UTI.  No other obvious soft tissue or musculoskeletal  focus at present.  No diarrhea.     --Acute perirectal abscess associated with constipation as above.  Colorectal surgery, Dr. Payne following to help guide timing/options/threshold for drainage options either with needle or open at his discretion.       --History urinary retention.  He denies acute retention symptoms.  Has pyuria/hematuria with culture data pending.     --Cutaneous candidiasis.  Mycamine added     --History MRSA     --Diabetes mellitus type 2, uncontrolled.  He reports the reason for this is forgetfulness     --History Johnston and chronic liver disease per past notes     --Left BKA     --Peripheral vascular disease        PLAN:    --IV vancomycin/Zosyn and Mycamine     --Check/review labs cultures and scans     --Discussed with microbiology     --History per nursing staff     --Discussed with Dr. Payne and Dr. Akbar as above.  They are considering drainage options     --Discussed with Dr. Gonzales     --Discussed with family, partial history per them     --Highly complex set of issues with high risk for further serious morbidity and other serious sequela            Usman Dong MD  8/1/2019

## 2019-08-01 NOTE — PLAN OF CARE
Problem: Patient Care Overview  Goal: Plan of Care Review  Outcome: Ongoing (interventions implemented as appropriate)   08/01/19 6065   Coping/Psychosocial   Plan of Care Reviewed With patient   Plan of Care Review   Progress no change   OTHER   Outcome Summary During change of shift pt and wife reported that pt had gotten up in his room with only the assistance of his wife and prostheisis to the bathroom. Pt reports he finally had a BM. Pt was re-educated on fall risk and concerns and bed alarm was turned on. Pt VSS during the sift. IVF and IV abx continued. Pt was eating food brought in by wife the during the majority of the shift. FSBG in to 300's. Will continue to monitor.

## 2019-08-01 NOTE — PROGRESS NOTES
"Pharmacy Consult-Vancomycin Dosing  Kendrick Crystal is a  48 y.o. male receiving vancomycin therapy.     Indication: Sepsis, cellulitis  Consulting Provider: CHINTAN Meade  ID Consult: Yes, Dr. Dong    Goal Trough:15-20mcg/mL    Current Antimicrobial Therapy  Micafungin 100mg IV Q24hr  Vancomycin  Zosyn 3.375mg IV Q8hr    Allergies  Allergies as of 07/30/2019 - Reviewed 07/30/2019   Allergen Reaction Noted   • No known drug allergy  11/13/2014       Labs    Results from last 7 days   Lab Units 08/01/19  0656 07/31/19  0506 07/30/19  1631   BUN mg/dL 23* 19 19   CREATININE mg/dL 0.79 0.82 0.79       Results from last 7 days   Lab Units 08/01/19  0656 07/31/19  0506 07/30/19  1631   WBC 10*3/mm3 11.27* 11.23* 12.13*       Evaluation of Dosing     Last Dose Received in the ED/Outside Facility: 2250mg in ED  Is Patient on Dialysis or Renal Replacement:     Ht - 190.5 cm (75\")  Wt - 113 kg (250 lb)    Estimated Creatinine Clearance: 151.7 mL/min (by C-G formula based on SCr of 0.79 mg/dL).    Intake & Output (last 3 days)       07/29 0701 - 07/30 0700 07/30 0701 - 07/31 0700 07/31 0701 - 08/01 0700 08/01 0701 - 08/02 0700    P.O.   720     IV Piggyback  1100 50     Total Intake(mL/kg)  1100 (9.7) 770 (6.8)     Urine (mL/kg/hr)  225      Total Output  225      Net  +875 +770             Urine Unmeasured Occurrence   1 x           Microbiology and Radiology  Microbiology Results (last 10 days)     Procedure Component Value - Date/Time    Blood Culture - Blood, Hand, Left [[id number]] Collected:  07/30/19 1845    Lab Status:  Preliminary result Specimen:  Blood from Hand, Left Updated:  07/31/19 2229     Blood Culture No growth at 24 hours    Blood Culture - Blood, Arm, Right [[id number]] Collected:  07/30/19 1840    Lab Status:  Preliminary result Specimen:  Blood from Arm, Right Updated:  07/31/19 2229     Blood Culture No growth at 24 hours    Urine Culture - Urine, Urine, Clean Catch [[id number]]  (Normal) " Collected:  07/30/19 1725    Lab Status:  Final result Specimen:  Urine, Clean Catch Updated:  07/31/19 2019     Urine Culture No growth          Evaluation of Level                  Results from last 7 days   Lab Units 08/01/19  0656   VANCOMYCIN TR mcg/mL 19.20          Assessment/Plan:  1. Pharmacy to dose empiric vancomycin therapy for intra-abdominal abscess. Patient received loading dose of vancomycin 2250mg (~20mg/k) IV x1 in the ED.  2. Maintenance dose of 1750mg IV Q12hr initiated on 7/31. Trough on 8/1 @ 0656 19.2mcg/mL. Basic metabolic panel 8/1 shows stable renal function, although UOP is unknown as not measured.  3. Due to patient BMI, I anticipate this patient may accumulate vancomycin. As trough is at high end of therapeutic range, will decrease maintenance dose to vancomycin 1500mg (~13mg/kg) IV Q12hr.  4. Trough level ordered for 8/3 @ 0900 prior to the fifth dose of this regimen. Pharmacy will continue to follow laboratory and microbiology results, UOP and clinical status to adjust therapy accordingly.    Shayy Noble, PharmD  Pharmacy Resident  8/1/2019  8:53 AM

## 2019-08-01 NOTE — PROGRESS NOTES
Continued Stay Note  [facility]     Patient Name: Kendrick Crystal  MRN: [id number]  Today's Date: 8/1/2019    Admit Date: 7/30/2019    Discharge Plan     Row Name 08/01/19 0905       Plan    Plan  Home w/ HH    Patient/Family in Agreement with Plan  yes    Plan Comments  Spoke w/ patient and wife at bedside. Denies any needs at this time. CM will continue to follow.    Final Discharge Disposition Code  06 - home with home health care        Discharge Codes    No documentation.             Fadi Valentine RN

## 2019-08-01 NOTE — PROGRESS NOTES
Kendrick Crystal     LOS: 2 days     Subjective   Still has a little tenderness when he moves his bowels or sits.  White blood cell count remains mildly elevated at 11.2.  He is afebrile.  Still dealing with hyperglycemia.      Objective     Vital Signs  Vitals:    08/01/19 0807   BP: 173/97   Pulse: 86   Resp:    Temp:    SpO2:        I/O  Intake & Output (last day)       07/31 0701 - 08/01 0700 08/01 0701 - 08/02 0700    P.O. 720     IV Piggyback 50     Total Intake(mL/kg) 770 (6.8)     Urine (mL/kg/hr)      Total Output      Net +770           Urine Unmeasured Occurrence 1 x           Physical Exam:    Abdomen soft and nontender.  Mild tenderness with palpation of the perineum between the scrotum and anus.         Results Review:       WBC WBC   Date Value Ref Range Status   08/01/2019 11.27 (H) 3.40 - 10.80 10*3/mm3 Final   07/31/2019 11.23 (H) 3.40 - 10.80 10*3/mm3 Final   07/30/2019 12.13 (H) 3.40 - 10.80 10*3/mm3 Final      HGB Hemoglobin   Date Value Ref Range Status   08/01/2019 11.9 (L) 13.0 - 17.7 g/dL Final   07/31/2019 12.4 (L) 13.0 - 17.7 g/dL Final   07/30/2019 13.7 13.0 - 17.7 g/dL Final      HCT Hematocrit   Date Value Ref Range Status   08/01/2019 37.6 37.5 - 51.0 % Final   07/31/2019 38.1 37.5 - 51.0 % Final   07/30/2019 42.9 37.5 - 51.0 % Final      PLT        Results from last 7 days   Lab Units 08/01/19  0656   PLATELETS 10*3/mm3 236            Sodium Sodium   Date Value Ref Range Status   08/01/2019 129 (L) 136 - 145 mmol/L Final   07/31/2019 131 (L) 136 - 145 mmol/L Final   07/30/2019 126 (L) 136 - 145 mmol/L Final      Potassium Potassium   Date Value Ref Range Status   08/01/2019 4.7 3.5 - 5.2 mmol/L Final   07/31/2019 4.5 3.5 - 5.2 mmol/L Final   07/30/2019 5.0 3.5 - 5.2 mmol/L Final      Chloride Chloride   Date Value Ref Range Status   08/01/2019 96 (L) 98 - 107 mmol/L Final   07/31/2019 94 (L) 98 - 107 mmol/L Final   07/30/2019 88 (L) 98 - 107 mmol/L Final      Bicarbonate No results  found for: PLASMABICARB   BUN BUN   Date Value Ref Range Status   08/01/2019 23 (H) 6 - 20 mg/dL Final   07/31/2019 19 6 - 20 mg/dL Final   07/30/2019 19 6 - 20 mg/dL Final      Creatinine Creatinine   Date Value Ref Range Status   08/01/2019 0.79 0.76 - 1.27 mg/dL Final   07/31/2019 0.82 0.76 - 1.27 mg/dL Final   07/30/2019 0.79 0.76 - 1.27 mg/dL Final      Calcium Calcium   Date Value Ref Range Status   08/01/2019 8.1 (L) 8.6 - 10.5 mg/dL Final   07/31/2019 8.3 (L) 8.6 - 10.5 mg/dL Final   07/30/2019 9.3 8.6 - 10.5 mg/dL Final      Magnesium Magnesium   Date Value Ref Range Status   07/31/2019 1.9 1.6 - 2.6 mg/dL Final            Imaging Results (last 24 hours)     Procedure Component Value Units Date/Time    CT Abdomen Pelvis With Contrast [[id number]] Collected:  07/30/19 1749     Updated:  07/31/19 1034    Narrative:       EXAMINATION: CT ABDOMEN PELVIS W CONTRAST- 07/30/2019      INDICATION: lower abd pain     TECHNIQUE: CT scan of the abdomen and pelvis was performed with  intravenous contrast.     The radiation dose reduction device was turned on for each scan per the  ALARA (As Low as Reasonably Achievable) protocol.     COMPARISON: 05/30/2019     FINDINGS: The most superior images demonstrate no basilar inflammatory  inflammatory process or pleural effusion. Small effusions have resolved  since previous examination. The liver and spleen are normal. There are  small bilateral fat-containing adrenal nodules consistent bilateral  myolipomas, unchanged. The pancreas is normal. There is no renal mass,  stone or obstruction. There is no ascites,  aneurysm or retroperitoneal  lymphadenopathy. There is diffuse thickening of the bladder wall,  probably related to the peroneal abscess. There is no inguinal  lymphadenopathy. There are sigmoid diverticula without features of  diverticulitis. The appendix is normal.     Just inferior to the prostate gland and anterior to the distal rectum  there is a 3.0 x 3.2 cm fluid  collection with enhancing margins  consistent with an abscess.       Impression:       There is a 3.0 x 3.2 cm fluid collection with enhancing  margins consistent with an abscess related to the inferior aspect of the  prostate gland and anterior to the distal rectum.     D:  07/30/2019  E:  07/31/2019     This report was finalized on 7/31/2019 10:31 AM by Dr. Guillermo Akbar MD.             Assessment/Plan       Sepsis (CMS/HCC)    DUNLAP Cirrhosis    Essential hypertension    Non-compliance    Hyperlipemia    Type 2 diabetes mellitus with circulatory disorder and uncontrolled    History of MRSA infection    S/P BKA (below knee amputation), left (CMS/HCC)    Peripheral vascular disease (CMS/HCC)    DM (diabetes mellitus) type II uncontrolled, periph vascular disorder (CMS/HCC)    Abscess    UTI (urinary tract infection)    Hyponatremia      Patient with 3 cm probable perineal abscess.  Severe poorly controlled diabetes.  I am able to elicit a little bit more tenderness in the perineum.  This gives me hope that perhaps we can find the abscess and drain it and surgery.  Will review CT scan again with radiology.  Will likely put him on the schedule tomorrow for incision and drainage.    Mumtaz Payne MD  08/01/19  8:09 AM

## 2019-08-01 NOTE — PLAN OF CARE
Problem: Skin Injury Risk (Adult)  Goal: Skin Health and Integrity  Outcome: Ongoing (interventions implemented as appropriate)   08/01/19 7572   Skin Injury Risk (Adult)   Skin Health and Integrity making progress toward outcome

## 2019-08-01 NOTE — PROGRESS NOTES
[facility] Medicine Services  PROGRESS NOTE    Patient Name: Kendrick Crystal  : 1970  MRN: [id number]    Date of Admission: 2019  Length of Stay: 2  Primary Care Physician: Keyanna Leone APRN    Subjective   Subjective     CC:  Abscess     HPI:  Patient reports he is doing ok. Blood sugars very uncontrolled. Nursing reports patient sneaking food including ice cream- discussed this with patient and need for tight control in order for best chance healing.     Review of Systems      Otherwise ROS is negative except as mentioned in the HPI.    Objective   Objective     Vital Signs:   Temp:  [97.8 °F (36.6 °C)-98.2 °F (36.8 °C)] 97.8 °F (36.6 °C)  Heart Rate:  [75-94] 86  Resp:  [16-19] 19  BP: (121-178)/(77-97) 173/97        Physical Exam:  GEN- no acute distress noted, resting in bed, mother at bedside   HEENT- atraumatic, normocephlic, eomi  NECK- supple, trachea midline, no masses  RESP: ctab, normal effort  CV: no murmurs, s1/s2, rrr  GI: soft, nt, nd  MSK: no edema noted, spontaneous movement of all extremities  NEURO: alert, oriented, no focal deficits  SKIN: no rashes  PSYCH: appropriate mood and affect       Results Reviewed:  I have personally reviewed current lab, radiology, and data and agree.    Results from last 7 days   Lab Units 19  0656 19  0506 19  1631   WBC 10*3/mm3 11.27* 11.23* 12.13*   HEMOGLOBIN g/dL 11.9* 12.4* 13.7   HEMATOCRIT % 37.6 38.1 42.9   PLATELETS 10*3/mm3 236 235 290   INR   --  1.02  --    PROCALCITONIN ng/mL  --   --  0.11     Results from last 7 days   Lab Units 19  0656 19  0506 19  1631   SODIUM mmol/L 129* 131* 126*   POTASSIUM mmol/L 4.7 4.5 5.0   CHLORIDE mmol/L 96* 94* 88*   CO2 mmol/L 21.0* 24.0 26.0   BUN mg/dL 23* 19 19   CREATININE mg/dL 0.79 0.82 0.79   GLUCOSE mg/dL 564* 510* 687*   CALCIUM mg/dL 8.1* 8.3* 9.3   ALT (SGPT) U/L 21  --  19   AST (SGOT) U/L 22  --  17     Estimated Creatinine  Clearance: 151.7 mL/min (by C-G formula based on SCr of 0.79 mg/dL).    Microbiology Results Abnormal     Procedure Component Value - Date/Time    Blood Culture - Blood, Hand, Left [[id number]] Collected:  07/30/19 1845    Lab Status:  Preliminary result Specimen:  Blood from Hand, Left Updated:  07/31/19 2229     Blood Culture No growth at 24 hours    Blood Culture - Blood, Arm, Right [[id number]] Collected:  07/30/19 1840    Lab Status:  Preliminary result Specimen:  Blood from Arm, Right Updated:  07/31/19 2229     Blood Culture No growth at 24 hours    Urine Culture - Urine, Urine, Clean Catch [[id number]]  (Normal) Collected:  07/30/19 1725    Lab Status:  Final result Specimen:  Urine, Clean Catch Updated:  07/31/19 2019     Urine Culture No growth          Imaging Results (last 24 hours)     ** No results found for the last 24 hours. **          Results for orders placed during the hospital encounter of 05/18/19   Adult Transthoracic Echo Complete W/ Cont if Necessary Per Protocol    Narrative · Estimated EF = 60%.  · Mild mitral valve regurgitation is present  · Mild tricuspid valve regurgitation is present.  · Calculated right ventricular systolic pressure from tricuspid   regurgitation is 29 mmHg.          I have reviewed the medications:  Scheduled Meds:    DULoxetine 30 mg Oral Daily   gabapentin 1,200 mg Oral TID   insulin detemir 20 Units Subcutaneous Q12H   insulin lispro 0-9 Units Subcutaneous 4x Daily With Meals & Nightly   insulin lispro 11 Units Subcutaneous Once   lisinopril 40 mg Oral Daily   metoprolol tartrate 75 mg Oral Q12H   micafungin (MYCAMINE)  mg Intravenous Q24H   piperacillin-tazobactam 3.375 g Intravenous Q8H   saccharomyces boulardii 250 mg Oral BID   sodium chloride 3 mL Intravenous Q12H   tamsulosin 0.8 mg Oral Nightly   vancomycin 1,500 mg Intravenous Q12H     Continuous Infusions:    [START ON 8/3/2019] Pharmacy Consult     Pharmacy to dose vancomycin     sodium chloride 100  "mL/hr Last Rate: 100 mL/hr (08/01/19 1029)     PRN Meds:.•  [START ON 8/3/2019] Pharmacy Consult  •  dextrose  •  dextrose  •  glucagon (human recombinant)  •  HYDROcodone-acetaminophen  •  HYDROmorphone  •  ondansetron **OR** ondansetron  •  Pharmacy to dose vancomycin  •  sodium chloride  •  sodium chloride      Assessment/Plan   Assessment / Plan     Active Hospital Problems    Diagnosis  POA   • **Sepsis (CMS/HCC) [A41.9]  Yes   • Abscess [L02.91]  Yes   • UTI (urinary tract infection) [N39.0]  Yes   • Hyponatremia [E87.1]  Yes   • DM (diabetes mellitus) type II uncontrolled, periph vascular disorder (CMS/HCC) [E11.51, E11.65]  Yes   • Peripheral vascular disease (CMS/HCC) [I73.9]  Yes   • S/P BKA (below knee amputation), left (CMS/[facility]) [Z89.512]  Not Applicable   • History of MRSA infection [Z86.14]  Yes   • Type 2 diabetes mellitus with circulatory disorder and uncontrolled [E11.59]  Yes   • Essential hypertension [I10]  Yes   • Hyperlipemia [E78.5]  Yes   • DUNLAP Cirrhosis [K74.60]  Yes   • Non-compliance [Z91.14]  Not Applicable      Resolved Hospital Problems   No resolved problems to display.        Brief Hospital Course to date:  Kendrick Crystal is a 48 year old gentleman with uncontrolled diabetes who presents with sepsis secondary to abscess inferior to the prostate and anterior to the rectum.          Sepsis, abscess, UTI   -pt meets sepsis criteria based on elevated lactic acid, leukocytosis, tachycardia and positive source  -CT abd/pel obtained: \"3.0 x 3.2 cm fluid collection with enhancing margins consistent with an abscess related to the inferior aspect of the prostate gland and anterior to the distal rectum.\"  -ID following for assistance with abx  -abscess drainage initially planned with IR- they are unable to reach fluid collection. Dr Rodriguez of urology and Dr Payne following-- discussing options for draining fluid. Tentatively on schedule with [facility] tomorrow for abscess drainage.   -continue IVF; NS " "@ 100ml/hour     Hyponatremia  -likely 2/2 to hyperglycemia  -s/p 2 liters NS; continue NS @ 100ml/hour  -trend; repeat BMP in am  -treat glucose     Uncontrolled T2DM  -pt reports that he often \"forgets\" to take his routine medications; including his insulin and does not check his glucose daily  -blood sugar >600 on admission, but did not meet criteria for DKA, now improving with subQ insulin   -increase basal and bolus today- now levemir 30 BID and 5 units lispro with meals   -hem a1c 14 this admission  -diabetes educator consult      HTN  -slightly uncontrolled last 24h- monitor may need to uptitrate medications   -continue routine Lisinopril and Metoprolol w/ hold parameters        DVT prophylaxis:  scd- right leg           Disposition: I expect the patient to be discharged pending     CODE STATUS:   Code Status and Medical Interventions:   Ordered at: 07/30/19 2051     Code Status:    CPR     Medical Interventions (Level of Support Prior to Arrest):    Full         Electronically signed by Brenda Gonzales MD, 08/01/19, 11:39 AM.      "

## 2019-08-01 NOTE — PLAN OF CARE
Problem: Skin Injury Risk (Adult)  Goal: Identify Related Risk Factors and Signs and Symptoms   08/01/19 0449   Skin Injury Risk (Adult)   Related Risk Factors (Skin Injury Risk) body weight extremes;edema;infection;medical devices;medication;mobility impaired;moisture;tissue perfusion altered

## 2019-08-02 ENCOUNTER — ANESTHESIA (OUTPATIENT)
Dept: PERIOP | Facility: HOSPITAL | Age: 49
End: 2019-08-02

## 2019-08-02 ENCOUNTER — ANESTHESIA EVENT (OUTPATIENT)
Dept: PERIOP | Facility: HOSPITAL | Age: 49
End: 2019-08-02

## 2019-08-02 LAB
ALBUMIN SERPL-MCNC: 2.8 G/DL (ref 3.5–5.2)
ALBUMIN/GLOB SERPL: 0.8 G/DL
ALP SERPL-CCNC: 160 U/L (ref 39–117)
ALT SERPL W P-5'-P-CCNC: 24 U/L (ref 1–41)
ANION GAP SERPL CALCULATED.3IONS-SCNC: 9 MMOL/L (ref 5–15)
AST SERPL-CCNC: 21 U/L (ref 1–40)
BASOPHILS # BLD AUTO: 0.1 10*3/MM3 (ref 0–0.2)
BASOPHILS NFR BLD AUTO: 0.8 % (ref 0–1.5)
BILIRUB SERPL-MCNC: <0.2 MG/DL (ref 0.2–1.2)
BUN BLD-MCNC: 14 MG/DL (ref 6–20)
BUN/CREAT SERPL: 18.9 (ref 7–25)
CALCIUM SPEC-SCNC: 8.3 MG/DL (ref 8.6–10.5)
CHLORIDE SERPL-SCNC: 99 MMOL/L (ref 98–107)
CO2 SERPL-SCNC: 25 MMOL/L (ref 22–29)
CREAT BLD-MCNC: 0.74 MG/DL (ref 0.76–1.27)
DEPRECATED RDW RBC AUTO: 45.4 FL (ref 37–54)
EOSINOPHIL # BLD AUTO: 0.28 10*3/MM3 (ref 0–0.4)
EOSINOPHIL NFR BLD AUTO: 2.3 % (ref 0.3–6.2)
ERYTHROCYTE [DISTWIDTH] IN BLOOD BY AUTOMATED COUNT: 14.8 % (ref 12.3–15.4)
GFR SERPL CREATININE-BSD FRML MDRD: 112 ML/MIN/1.73
GLOBULIN UR ELPH-MCNC: 3.3 GM/DL
GLUCOSE BLD-MCNC: 418 MG/DL (ref 65–99)
GLUCOSE BLDC GLUCOMTR-MCNC: 114 MG/DL (ref 70–130)
GLUCOSE BLDC GLUCOMTR-MCNC: 162 MG/DL (ref 70–130)
GLUCOSE BLDC GLUCOMTR-MCNC: 165 MG/DL (ref 70–130)
GLUCOSE BLDC GLUCOMTR-MCNC: 329 MG/DL (ref 70–130)
GLUCOSE BLDC GLUCOMTR-MCNC: 356 MG/DL (ref 70–130)
GLUCOSE BLDC GLUCOMTR-MCNC: 417 MG/DL (ref 70–130)
GLUCOSE BLDC GLUCOMTR-MCNC: 520 MG/DL (ref 70–130)
HCT VFR BLD AUTO: 36 % (ref 37.5–51)
HGB BLD-MCNC: 11.5 G/DL (ref 13–17.7)
IMM GRANULOCYTES # BLD AUTO: 0.09 10*3/MM3 (ref 0–0.05)
IMM GRANULOCYTES NFR BLD AUTO: 0.7 % (ref 0–0.5)
LYMPHOCYTES # BLD AUTO: 1.82 10*3/MM3 (ref 0.7–3.1)
LYMPHOCYTES NFR BLD AUTO: 15 % (ref 19.6–45.3)
MCH RBC QN AUTO: 27.3 PG (ref 26.6–33)
MCHC RBC AUTO-ENTMCNC: 31.9 G/DL (ref 31.5–35.7)
MCV RBC AUTO: 85.3 FL (ref 79–97)
MONOCYTES # BLD AUTO: 0.95 10*3/MM3 (ref 0.1–0.9)
MONOCYTES NFR BLD AUTO: 7.8 % (ref 5–12)
NEUTROPHILS # BLD AUTO: 8.93 10*3/MM3 (ref 1.7–7)
NEUTROPHILS NFR BLD AUTO: 73.4 % (ref 42.7–76)
NRBC BLD AUTO-RTO: 0 /100 WBC (ref 0–0.2)
PLATELET # BLD AUTO: 223 10*3/MM3 (ref 140–450)
PMV BLD AUTO: 12.1 FL (ref 6–12)
POTASSIUM BLD-SCNC: 4.8 MMOL/L (ref 3.5–5.2)
PROT SERPL-MCNC: 6.1 G/DL (ref 6–8.5)
RBC # BLD AUTO: 4.22 10*6/MM3 (ref 4.14–5.8)
SODIUM BLD-SCNC: 133 MMOL/L (ref 136–145)
WBC NRBC COR # BLD: 12.17 10*3/MM3 (ref 3.4–10.8)

## 2019-08-02 PROCEDURE — 25010000002 VANCOMYCIN 10 G RECONSTITUTED SOLUTION

## 2019-08-02 PROCEDURE — 82962 GLUCOSE BLOOD TEST: CPT

## 2019-08-02 PROCEDURE — 25010000002 PIPERACILLIN SOD-TAZOBACTAM PER 1 G: Performed by: NURSE PRACTITIONER

## 2019-08-02 PROCEDURE — 25010000002 ONDANSETRON PER 1 MG: Performed by: NURSE ANESTHETIST, CERTIFIED REGISTERED

## 2019-08-02 PROCEDURE — 25010000003 LIDOCAINE 1 % SOLUTION: Performed by: NURSE ANESTHETIST, CERTIFIED REGISTERED

## 2019-08-02 PROCEDURE — 25010000002 FENTANYL CITRATE (PF) 100 MCG/2ML SOLUTION: Performed by: NURSE ANESTHETIST, CERTIFIED REGISTERED

## 2019-08-02 PROCEDURE — 63710000001 INSULIN LISPRO (HUMAN) PER 5 UNITS: Performed by: INTERNAL MEDICINE

## 2019-08-02 PROCEDURE — 0D9P0ZX DRAINAGE OF RECTUM, OPEN APPROACH, DIAGNOSTIC: ICD-10-PCS | Performed by: COLON & RECTAL SURGERY

## 2019-08-02 PROCEDURE — 63710000001 INSULIN DETEMIR PER 5 UNITS: Performed by: INTERNAL MEDICINE

## 2019-08-02 PROCEDURE — 87070 CULTURE OTHR SPECIMN AEROBIC: CPT | Performed by: COLON & RECTAL SURGERY

## 2019-08-02 PROCEDURE — 87205 SMEAR GRAM STAIN: CPT | Performed by: COLON & RECTAL SURGERY

## 2019-08-02 PROCEDURE — 99232 SBSQ HOSP IP/OBS MODERATE 35: CPT | Performed by: INTERNAL MEDICINE

## 2019-08-02 PROCEDURE — 25010000002 PROPOFOL 10 MG/ML EMULSION: Performed by: NURSE ANESTHETIST, CERTIFIED REGISTERED

## 2019-08-02 PROCEDURE — 87075 CULTR BACTERIA EXCEPT BLOOD: CPT | Performed by: COLON & RECTAL SURGERY

## 2019-08-02 PROCEDURE — 85025 COMPLETE CBC W/AUTO DIFF WBC: CPT | Performed by: INTERNAL MEDICINE

## 2019-08-02 PROCEDURE — 87186 SC STD MICRODIL/AGAR DIL: CPT | Performed by: COLON & RECTAL SURGERY

## 2019-08-02 PROCEDURE — 80053 COMPREHEN METABOLIC PANEL: CPT | Performed by: INTERNAL MEDICINE

## 2019-08-02 RX ORDER — FENTANYL CITRATE 50 UG/ML
50 INJECTION, SOLUTION INTRAMUSCULAR; INTRAVENOUS
Status: CANCELLED | OUTPATIENT
Start: 2019-08-02

## 2019-08-02 RX ORDER — FUROSEMIDE 40 MG/1
40 TABLET ORAL DAILY
Status: DISCONTINUED | OUTPATIENT
Start: 2019-08-02 | End: 2019-08-03

## 2019-08-02 RX ORDER — LIDOCAINE HYDROCHLORIDE 10 MG/ML
0.5 INJECTION, SOLUTION EPIDURAL; INFILTRATION; INTRACAUDAL; PERINEURAL ONCE AS NEEDED
Status: DISCONTINUED | OUTPATIENT
Start: 2019-08-02 | End: 2019-08-02 | Stop reason: HOSPADM

## 2019-08-02 RX ORDER — ONDANSETRON 2 MG/ML
4 INJECTION INTRAMUSCULAR; INTRAVENOUS ONCE AS NEEDED
Status: CANCELLED | OUTPATIENT
Start: 2019-08-02

## 2019-08-02 RX ORDER — GLYCOPYRROLATE 0.2 MG/ML
INJECTION INTRAMUSCULAR; INTRAVENOUS AS NEEDED
Status: DISCONTINUED | OUTPATIENT
Start: 2019-08-02 | End: 2019-08-02 | Stop reason: SURG

## 2019-08-02 RX ORDER — SODIUM CHLORIDE, SODIUM LACTATE, POTASSIUM CHLORIDE, CALCIUM CHLORIDE 600; 310; 30; 20 MG/100ML; MG/100ML; MG/100ML; MG/100ML
INJECTION, SOLUTION INTRAVENOUS CONTINUOUS PRN
Status: DISCONTINUED | OUTPATIENT
Start: 2019-08-02 | End: 2019-08-02 | Stop reason: SURG

## 2019-08-02 RX ORDER — FAMOTIDINE 10 MG/ML
20 INJECTION, SOLUTION INTRAVENOUS ONCE
Status: CANCELLED | OUTPATIENT
Start: 2019-08-02 | End: 2019-08-02

## 2019-08-02 RX ORDER — ATRACURIUM BESYLATE 10 MG/ML
INJECTION, SOLUTION INTRAVENOUS AS NEEDED
Status: DISCONTINUED | OUTPATIENT
Start: 2019-08-02 | End: 2019-08-02 | Stop reason: SURG

## 2019-08-02 RX ORDER — ONDANSETRON 2 MG/ML
INJECTION INTRAMUSCULAR; INTRAVENOUS AS NEEDED
Status: DISCONTINUED | OUTPATIENT
Start: 2019-08-02 | End: 2019-08-02 | Stop reason: SURG

## 2019-08-02 RX ORDER — FAMOTIDINE 20 MG/1
20 TABLET, FILM COATED ORAL ONCE
Status: COMPLETED | OUTPATIENT
Start: 2019-08-02 | End: 2019-08-02

## 2019-08-02 RX ORDER — LIDOCAINE HYDROCHLORIDE 10 MG/ML
INJECTION, SOLUTION INFILTRATION; PERINEURAL AS NEEDED
Status: DISCONTINUED | OUTPATIENT
Start: 2019-08-02 | End: 2019-08-02 | Stop reason: SURG

## 2019-08-02 RX ORDER — MAGNESIUM HYDROXIDE 1200 MG/15ML
LIQUID ORAL AS NEEDED
Status: DISCONTINUED | OUTPATIENT
Start: 2019-08-02 | End: 2019-08-02 | Stop reason: HOSPADM

## 2019-08-02 RX ORDER — SODIUM CHLORIDE, SODIUM LACTATE, POTASSIUM CHLORIDE, CALCIUM CHLORIDE 600; 310; 30; 20 MG/100ML; MG/100ML; MG/100ML; MG/100ML
9 INJECTION, SOLUTION INTRAVENOUS CONTINUOUS
Status: DISCONTINUED | OUTPATIENT
Start: 2019-08-02 | End: 2019-08-02

## 2019-08-02 RX ORDER — NEOSTIGMINE METHYLSULFATE 5 MG/5 ML
SYRINGE (ML) INTRAVENOUS AS NEEDED
Status: DISCONTINUED | OUTPATIENT
Start: 2019-08-02 | End: 2019-08-02 | Stop reason: SURG

## 2019-08-02 RX ORDER — FENTANYL CITRATE 50 UG/ML
INJECTION, SOLUTION INTRAMUSCULAR; INTRAVENOUS AS NEEDED
Status: DISCONTINUED | OUTPATIENT
Start: 2019-08-02 | End: 2019-08-02 | Stop reason: SURG

## 2019-08-02 RX ORDER — PROPOFOL 10 MG/ML
VIAL (ML) INTRAVENOUS AS NEEDED
Status: DISCONTINUED | OUTPATIENT
Start: 2019-08-02 | End: 2019-08-02 | Stop reason: SURG

## 2019-08-02 RX ORDER — SODIUM CHLORIDE 0.9 % (FLUSH) 0.9 %
3-10 SYRINGE (ML) INJECTION AS NEEDED
Status: DISCONTINUED | OUTPATIENT
Start: 2019-08-02 | End: 2019-08-02 | Stop reason: HOSPADM

## 2019-08-02 RX ORDER — HYDROMORPHONE HYDROCHLORIDE 1 MG/ML
0.5 INJECTION, SOLUTION INTRAMUSCULAR; INTRAVENOUS; SUBCUTANEOUS
Status: CANCELLED | OUTPATIENT
Start: 2019-08-02

## 2019-08-02 RX ORDER — SODIUM CHLORIDE 0.9 % (FLUSH) 0.9 %
3 SYRINGE (ML) INJECTION EVERY 12 HOURS SCHEDULED
Status: DISCONTINUED | OUTPATIENT
Start: 2019-08-02 | End: 2019-08-02 | Stop reason: HOSPADM

## 2019-08-02 RX ADMIN — METOPROLOL TARTRATE 75 MG: 50 TABLET ORAL at 08:39

## 2019-08-02 RX ADMIN — FAMOTIDINE 20 MG: 20 TABLET ORAL at 14:54

## 2019-08-02 RX ADMIN — LISINOPRIL 40 MG: 10 TABLET ORAL at 08:39

## 2019-08-02 RX ADMIN — HYDROCODONE BITARTRATE AND ACETAMINOPHEN 1 TABLET: 10; 325 TABLET ORAL at 18:04

## 2019-08-02 RX ADMIN — GABAPENTIN 1200 MG: 400 CAPSULE ORAL at 08:39

## 2019-08-02 RX ADMIN — PROPOFOL 150 MG: 10 INJECTION, EMULSION INTRAVENOUS at 15:55

## 2019-08-02 RX ADMIN — Medication 250 MG: at 21:30

## 2019-08-02 RX ADMIN — SODIUM CHLORIDE, POTASSIUM CHLORIDE, SODIUM LACTATE AND CALCIUM CHLORIDE: 600; 310; 30; 20 INJECTION, SOLUTION INTRAVENOUS at 15:48

## 2019-08-02 RX ADMIN — INSULIN LISPRO 8 UNITS: 100 INJECTION, SOLUTION INTRAVENOUS; SUBCUTANEOUS at 11:58

## 2019-08-02 RX ADMIN — INSULIN DETEMIR 30 UNITS: 100 INJECTION, SOLUTION SUBCUTANEOUS at 21:36

## 2019-08-02 RX ADMIN — TAZOBACTAM SODIUM AND PIPERACILLIN SODIUM 3.38 G: 375; 3 INJECTION, SOLUTION INTRAVENOUS at 02:45

## 2019-08-02 RX ADMIN — INSULIN DETEMIR 30 UNITS: 100 INJECTION, SOLUTION SUBCUTANEOUS at 08:39

## 2019-08-02 RX ADMIN — GLYCOPYRROLATE 0.4 MG: 0.2 INJECTION, SOLUTION INTRAMUSCULAR; INTRAVENOUS at 16:22

## 2019-08-02 RX ADMIN — INSULIN LISPRO 2 UNITS: 100 INJECTION, SOLUTION INTRAVENOUS; SUBCUTANEOUS at 21:30

## 2019-08-02 RX ADMIN — EPHEDRINE SULFATE 15 MG: 50 INJECTION INTRAMUSCULAR; INTRAVENOUS; SUBCUTANEOUS at 16:17

## 2019-08-02 RX ADMIN — FUROSEMIDE 40 MG: 40 TABLET ORAL at 18:04

## 2019-08-02 RX ADMIN — GABAPENTIN 1200 MG: 400 CAPSULE ORAL at 21:30

## 2019-08-02 RX ADMIN — TAZOBACTAM SODIUM AND PIPERACILLIN SODIUM 3.38 G: 375; 3 INJECTION, SOLUTION INTRAVENOUS at 11:43

## 2019-08-02 RX ADMIN — VANCOMYCIN HYDROCHLORIDE 1500 MG: 10 INJECTION, POWDER, LYOPHILIZED, FOR SOLUTION INTRAVENOUS at 09:17

## 2019-08-02 RX ADMIN — METOPROLOL TARTRATE 75 MG: 50 TABLET ORAL at 21:30

## 2019-08-02 RX ADMIN — VANCOMYCIN HYDROCHLORIDE 1500 MG: 10 INJECTION, POWDER, LYOPHILIZED, FOR SOLUTION INTRAVENOUS at 21:43

## 2019-08-02 RX ADMIN — EPHEDRINE SULFATE 15 MG: 50 INJECTION INTRAMUSCULAR; INTRAVENOUS; SUBCUTANEOUS at 16:09

## 2019-08-02 RX ADMIN — LIDOCAINE HYDROCHLORIDE 50 MG: 10 INJECTION, SOLUTION INFILTRATION; PERINEURAL at 15:55

## 2019-08-02 RX ADMIN — FENTANYL CITRATE 50 MCG: 50 INJECTION, SOLUTION INTRAMUSCULAR; INTRAVENOUS at 16:20

## 2019-08-02 RX ADMIN — SODIUM CHLORIDE, PRESERVATIVE FREE 3 ML: 5 INJECTION INTRAVENOUS at 09:31

## 2019-08-02 RX ADMIN — HYDROCODONE BITARTRATE AND ACETAMINOPHEN 1 TABLET: 10; 325 TABLET ORAL at 01:17

## 2019-08-02 RX ADMIN — MICAFUNGIN SODIUM 100 MG: 20 INJECTION, POWDER, LYOPHILIZED, FOR SOLUTION INTRAVENOUS at 11:59

## 2019-08-02 RX ADMIN — INSULIN LISPRO 7 UNITS: 100 INJECTION, SOLUTION INTRAVENOUS; SUBCUTANEOUS at 11:58

## 2019-08-02 RX ADMIN — ONDANSETRON 4 MG: 2 INJECTION INTRAMUSCULAR; INTRAVENOUS at 16:22

## 2019-08-02 RX ADMIN — Medication 3 MG: at 16:22

## 2019-08-02 RX ADMIN — TAMSULOSIN HYDROCHLORIDE 0.8 MG: 0.4 CAPSULE ORAL at 21:30

## 2019-08-02 RX ADMIN — INSULIN LISPRO 8 UNITS: 100 INJECTION, SOLUTION INTRAVENOUS; SUBCUTANEOUS at 18:54

## 2019-08-02 RX ADMIN — SODIUM CHLORIDE, POTASSIUM CHLORIDE, SODIUM LACTATE AND CALCIUM CHLORIDE 9 ML/HR: 600; 310; 30; 20 INJECTION, SOLUTION INTRAVENOUS at 14:55

## 2019-08-02 RX ADMIN — DULOXETINE HYDROCHLORIDE 30 MG: 30 CAPSULE, DELAYED RELEASE ORAL at 09:17

## 2019-08-02 RX ADMIN — FENTANYL CITRATE 50 MCG: 50 INJECTION, SOLUTION INTRAMUSCULAR; INTRAVENOUS at 15:55

## 2019-08-02 RX ADMIN — GABAPENTIN 1200 MG: 400 CAPSULE ORAL at 18:04

## 2019-08-02 RX ADMIN — Medication 250 MG: at 08:40

## 2019-08-02 RX ADMIN — INSULIN LISPRO 8 UNITS: 100 INJECTION, SOLUTION INTRAVENOUS; SUBCUTANEOUS at 09:18

## 2019-08-02 RX ADMIN — ATRACURIUM BESYLATE 40 MG: 10 INJECTION, SOLUTION INTRAVENOUS at 15:55

## 2019-08-02 RX ADMIN — INSULIN LISPRO 8 UNITS: 100 INJECTION, SOLUTION INTRAVENOUS; SUBCUTANEOUS at 09:29

## 2019-08-02 NOTE — PROGRESS NOTES
[facility] Medicine Services  PROGRESS NOTE    Patient Name: Kendrick Crystal  : 1970  MRN: [id number]    Date of Admission: 2019  Length of Stay: 3  Primary Care Physician: Keyanna Leone APRN    Subjective   Subjective     CC:  Abscess     HPI:  Blood sugars remain uncontrolled. Patient frustrated he will remain NPO this long prior to surgery. Mother at bedside.     Review of Systems      Otherwise ROS is negative except as mentioned in the HPI.    Objective   Objective     Vital Signs:   Temp:  [97.2 °F (36.2 °C)-98.1 °F (36.7 °C)] 98.1 °F (36.7 °C)  Heart Rate:  [78-95] 78  Resp:  [19-20] 20  BP: (108-191)/() 191/104        Physical Exam:  GEN: NAD, resting in bed, awake, obese, malodorus   HEENT: on room air, atraumatic, normocephalic  NECK: supple, no masses  RESP: on room air, normal effort  CV: on tele, sinus rhythm  PSYCH: normal affect, appropriate  NEURO: awake, alert, no focal deficits noted  MSK: no edema noted  SKIN: no rashes noted       Results Reviewed:  I have personally reviewed current lab, radiology, and data and agree.    Results from last 7 days   Lab Units 19  0719  0656 19  0506 19  1631   WBC 10*3/mm3 12.17* 11.27* 11.23* 12.13*   HEMOGLOBIN g/dL 11.5* 11.9* 12.4* 13.7   HEMATOCRIT % 36.0* 37.6 38.1 42.9   PLATELETS 10*3/mm3 223 236 235 290   INR   --   --  1.02  --    PROCALCITONIN ng/mL  --   --   --  0.11     Results from last 7 days   Lab Units 19  0719  0656 19  0506 19  1631   SODIUM mmol/L 133* 129* 131* 126*   POTASSIUM mmol/L 4.8 4.7 4.5 5.0   CHLORIDE mmol/L 99 96* 94* 88*   CO2 mmol/L 25.0 21.0* 24.0 26.0   BUN mg/dL 14 23* 19 19   CREATININE mg/dL 0.74* 0.79 0.82 0.79   GLUCOSE mg/dL 418* 564* 510* 687*   CALCIUM mg/dL 8.3* 8.1* 8.3* 9.3   ALT (SGPT) U/L 24 21  --  19   AST (SGOT) U/L 21 22  --  17     Estimated Creatinine Clearance: 162 mL/min (A) (by C-G formula based on SCr of  0.74 mg/dL (L)).    Microbiology Results Abnormal     Procedure Component Value - Date/Time    Blood Culture - Blood, Hand, Left [[id number]] Collected:  07/30/19 1845    Lab Status:  Preliminary result Specimen:  Blood from Hand, Left Updated:  08/01/19 2157     Blood Culture No growth at 2 days    Blood Culture - Blood, Arm, Right [[id number]] Collected:  07/30/19 1840    Lab Status:  Preliminary result Specimen:  Blood from Arm, Right Updated:  08/01/19 2157     Blood Culture No growth at 2 days    Urine Culture - Urine, Urine, Clean Catch [[id number]]  (Normal) Collected:  07/30/19 1725    Lab Status:  Final result Specimen:  Urine, Clean Catch Updated:  07/31/19 2019     Urine Culture No growth          Imaging Results (last 24 hours)     ** No results found for the last 24 hours. **          Results for orders placed during the hospital encounter of 05/18/19   Adult Transthoracic Echo Complete W/ Cont if Necessary Per Protocol    Narrative · Estimated EF = 60%.  · Mild mitral valve regurgitation is present  · Mild tricuspid valve regurgitation is present.  · Calculated right ventricular systolic pressure from tricuspid   regurgitation is 29 mmHg.          I have reviewed the medications:  Scheduled Meds:    DULoxetine 30 mg Oral Daily   gabapentin 1,200 mg Oral TID   insulin detemir 30 Units Subcutaneous Q12H   insulin lispro 0-9 Units Subcutaneous 4x Daily With Meals & Nightly   insulin lispro 8 Units Subcutaneous TID With Meals   lisinopril 40 mg Oral Daily   metoprolol tartrate 75 mg Oral Q12H   micafungin (MYCAMINE)  mg Intravenous Q24H   piperacillin-tazobactam 3.375 g Intravenous Q8H   saccharomyces boulardii 250 mg Oral BID   sodium chloride 3 mL Intravenous Q12H   tamsulosin 0.8 mg Oral Nightly   vancomycin 1,500 mg Intravenous Q12H     Continuous Infusions:    [START ON 8/3/2019] Pharmacy Consult     Pharmacy to dose vancomycin     sodium chloride 100 mL/hr Last Rate: 100 mL/hr (08/02/19 0907)  "    PRN Meds:.•  [START ON 8/3/2019] Pharmacy Consult  •  dextrose  •  dextrose  •  glucagon (human recombinant)  •  HYDROcodone-acetaminophen  •  HYDROmorphone  •  ondansetron **OR** ondansetron  •  Pharmacy to dose vancomycin  •  sodium chloride  •  sodium chloride      Assessment/Plan   Assessment / Plan     Active Hospital Problems    Diagnosis  POA   • **Sepsis (CMS/[facility]) [A41.9]  Yes   • Abscess [L02.91]  Yes   • UTI (urinary tract infection) [N39.0]  Yes   • Hyponatremia [E87.1]  Yes   • DM (diabetes mellitus) type II uncontrolled, periph vascular disorder (CMS/[facility]) [E11.51, E11.65]  Yes   • Peripheral vascular disease (CMS/[facility]) [I73.9]  Yes   • S/P BKA (below knee amputation), left (CMS/[facility]) [Z89.512]  Not Applicable   • History of MRSA infection [Z86.14]  Yes   • Type 2 diabetes mellitus with circulatory disorder and uncontrolled [E11.59]  Yes   • Essential hypertension [I10]  Yes   • Hyperlipemia [E78.5]  Yes   • DUNLAP Cirrhosis [K74.60]  Yes   • Non-compliance [Z91.14]  Not Applicable      Resolved Hospital Problems   No resolved problems to display.        Brief Hospital Course to date:  Kendrick Crystal is a 48 year old gentleman with uncontrolled diabetes who presents with sepsis secondary to abscess inferior to the prostate and anterior to the rectum.          Sepsis, abscess, UTI   -pt meets sepsis criteria based on elevated lactic acid, leukocytosis, tachycardia and positive source  -CT abd/pel obtained: \"3.0 x 3.2 cm fluid collection with enhancing margins consistent with an abscess related to the inferior aspect of the prostate gland and anterior to the distal rectum.\"  -ID following for assistance with abx  -abscess drainage initially planned with IR- they are unable to reach fluid collection. Dr Rodriguez of urology and Dr Payne following-- discussing options for draining fluid. Tentatively on schedule with [facility] this afternoon for abscess drainage.   -continue IVF; NS @ " "100ml/hour     Hyponatremia  -likely 2/2 to hyperglycemia, improving   -s/p 2 liters NS; continue NS @ 100ml/hour  -trend; repeat BMP in am  -treat glucose     Uncontrolled T2DM  -pt reports that he often \"forgets\" to take his routine medications; including his insulin and does not check his glucose daily  -blood sugar >600 on admission, but did not meet criteria for DKA, now improving with subQ insulin   -increase bolus today- now levemir 30 BID and 8 units lispro with meals , SSI  -hem a1c 14 this admission  -diabetes educator consult      HTN  -continues to be uncontrolled last 24h- monitor may need to uptitrate medications   -continue routine Lisinopril and Metoprolol w/ hold parameters        DVT prophylaxis:  scd- right leg           Disposition: I expect the patient to be discharged pending     CODE STATUS:   Code Status and Medical Interventions:   Ordered at: 07/30/19 2051     Code Status:    CPR     Medical Interventions (Level of Support Prior to Arrest):    Full         Electronically signed by Brenda Gonzales MD, 08/02/19, 10:25 AM.      "

## 2019-08-02 NOTE — PROGRESS NOTES
[facility] Progress Note        Antibiotics:  Anti-Infectives (From admission, onward)    Ordered     Dose/Rate Route Frequency Start Stop    19 0844  vancomycin 1500 mg/500 mL 0.9% NS IVPB ([facility])     Ordering Provider:  Shayy Noble RPH    1,500 mg  over 90 Minutes Intravenous Every 12 Hours 19 0930 19 0929    19 0955  micafungin 100 mg/100 mL 0.9% NS IVPB ([facility])     Ordering Provider:  Usman Dong MD    100 mg  over 60 Minutes Intravenous Every 24 Hours 19 1100 08/10/19 1059    19  piperacillin-tazobactam (ZOSYN) 3.375 g in iso-osmotic dextrose 50 ml (premix)     Comments:  First dose given in ED.   Ordering Provider:  Keyanna Aponte APRN    3.375 g  over 4 Hours Intravenous Every 8 Hours 19 0100 19 0059    19 205  Pharmacy to dose vancomycin     Ordering Provider:  Keyanna Aponte APRN     Does not apply Continuous PRN 19 2047 19 2046    19 1820  vancomycin 2250 mg/500 mL 0.9% NS IVPB ([facility])     Ordering Provider:  Kendrick Sotelo MD    20 mg/kg × 113 kg  over 3 Hours Intravenous Once 19 1822 19 2155    19 1820  piperacillin-tazobactam (ZOSYN) 3.375 g in iso-osmotic dextrose 50 ml (premix)     Ordering Provider:  Kendrick Sotelo MD    3.375 g  over 30 Minutes Intravenous Once 19 1822 19 1924          CC: rectal pain    HPI:    Patient is a 48 y.o.  Yr old male with history of poorly contolled T2DM, DUNLAP/liver cirrhosis, HTN, PVD/Left BKA, and multiple skin abscesses/MRSA who presented to [facility] ED with right shin wound infection summer 2018.  He was unable to get to see Dr. Martinez as his father passed away unexpectedly on 18 and he had to wait until after the .  The wound worsened becoming gangrenous and he came to [facility] ED in 2018.  He also had been hospitalized at [facility] and then transferred to  for a severe penis/scrotum infection requiring surgical  debridement in summer 2018.  He received antibiotics regarding his right leg infection, generally improved over the remainder of summer 2018 but that area had not completely healed. He was admitted April 24, 2019 with acute left lower abdominal wall redness/swelling and pain, spontaneous drainage associated with fever/chills and uncontrolled blood sugars.  4/26 I&D requiring wide debridement , severe/deep infection and transferred to [facility] on May 3 with IV Zosyn/oral doxycycline. Cultures had lactobacillus and mixed gram-positive skin sherly including alpha strep, staph epidermidis and corynebacterium. Readmitted to [facility] May 18, 2019, increasing generalized edema ultimately transitioned to oral doxycycline and healed.     He presented to the emergency room July 30, 2019 with acute rectal pain that had begun 7/27; this is associated with constipation for days, chills and nausea/dry heaving.  White blood cell count elevated, high hemoglobin A1c over 13, urinalysis with hematuria/pyuria and CT scan with 3 x 3 cm fluid collection anterior to the distal rectum and per discussion with Dr. Akbar/Jennifer, this is not in the prostate.  Colorectal surgery/urology saw him for consideration of surgical options/timing/threshold, etc..     8/2/19 possible I&D -v- other drainage per Dr Payne today; He reports some perirectal pain but not progressive , currently dull, nonradiating, better with pain meds, 3 out of 10 at present.  He denies hematochezia melena or hematemesis.  No diarrhea.  Recent Constipation as above.  No dysuria hematuria or pyuria.  No hesitancy urgency or flank pain at present.  Prior history urinary retention per his reports.     No headache photophobia or neck stiffness.  No shortness of breath cough or hemoptysis.  No skin rash.       ROS:      8/2/19 No f/c/s. No n/v/d. No rash. No new ADR to Abx.       Constitutional--appetite diminished with malaise.  No sweats   Heent-- No new  "vision, hearing or throat complaints.  No epistaxis or oral sores.  Denies odynophagia or dysphagia.  No flashers, floaters or eye pain. No odynophagia or dysphagia. No headache, photophobia or neck stiffness.  CV-- No chest pain, palpitation or syncope  Resp-- No SOB/cough/Hemoptysis  GI- No  hematochezia, melena, or hematemesis. Denies jaundice ; he has chronic liver disease  --as above.  No dysuria, hematuria, or flank pain.  Denies hesitancy, urgency or flank pain.  Lymph- no swollen lymph nodes in neck/axilla or groin.   Heme- No active bruising or bleeding; no Hx of DVT or PE.  MS-- no swelling or pain in the bones or joints of arms/legs.  No new back pain.  Neuro-- No acute focal weakness or numbness in the arms or legs.  No seizures.     Full 12 point review of systems reviewed and negative otherwise for acute complaints, except for above          PE:   BP (!) 191/104   Pulse 78   Temp 98.1 °F (36.7 °C)   Resp 20   Ht 190.5 cm (75\")   Wt 113 kg (250 lb)   SpO2 94%   BMI 31.25 kg/m²       GENERAL: Awake and alert, in no acute distress.   HEENT: Normocephalic, atraumatic.  PERRL. EOMI. No conjunctival injection. No icterus. Oropharynx clear without evidence of thrush or exudate. No evidence of peridontal disease.    NECK: Supple without nuchal rigidity. No mass.  LYMPH: No cervical, axillary or inguinal lymphadenopathy.  HEART: RRR; No murmur, rubs, gallops.   LUNGS: Clear to auscultation bilaterally without wheezing, rales, rhonchi. Normal respiratory effort. Nonlabored. No dullness.  ABDOMEN: Soft, nontender, nondistended. Positive bowel sounds. No rebound or guarding. NO mass or HSM.  EXT:  No cyanosis, clubbing or edema. No cord.  : Genitalia generally unremarkable.  Unable to elicit any perineal tenderness.  No discrete mass bulge or fluctuance.  No crepitus or bulla.  I am unable to elicit any perianal tenderness with no obvious bulge or fluctuance there either.  Slight candidiasis  MSK: FROM " without joint effusions noted arms/legs.    SKIN: Warm and dry without cutaneous eruptions on Inspection/palpation.    NEURO: Oriented to PPT. No focal deficits on motor/sensory exam at arms/legs.     No peripheral stigmata/phenomena of endocarditis           Laboratory Data    Results from last 7 days   Lab Units 08/02/19  0728 08/01/19  0656 07/31/19  0506   WBC 10*3/mm3 12.17* 11.27* 11.23*   HEMOGLOBIN g/dL 11.5* 11.9* 12.4*   HEMATOCRIT % 36.0* 37.6 38.1   PLATELETS 10*3/mm3 223 236 235     Results from last 7 days   Lab Units 08/02/19  0727   SODIUM mmol/L 133*   POTASSIUM mmol/L 4.8   CHLORIDE mmol/L 99   CO2 mmol/L 25.0   BUN mg/dL 14   CREATININE mg/dL 0.74*   GLUCOSE mg/dL 418*   CALCIUM mg/dL 8.3*     Results from last 7 days   Lab Units 08/02/19  0727   ALK PHOS U/L 160*   BILIRUBIN mg/dL <0.2*   ALT (SGPT) U/L 24   AST (SGOT) U/L 21               Estimated Creatinine Clearance: 162 mL/min (A) (by C-G formula based on SCr of 0.74 mg/dL (L)).      Microbiology:      Radiology:  Imaging Results (last 72 hours)     Procedure Component Value Units Date/Time    CT Abdomen Pelvis With Contrast [[id number]] Collected:  07/30/19 1749     Updated:  07/31/19 1034    Narrative:       EXAMINATION: CT ABDOMEN PELVIS W CONTRAST- 07/30/2019      INDICATION: lower abd pain     TECHNIQUE: CT scan of the abdomen and pelvis was performed with  intravenous contrast.     The radiation dose reduction device was turned on for each scan per the  ALARA (As Low as Reasonably Achievable) protocol.     COMPARISON: 05/30/2019     FINDINGS: The most superior images demonstrate no basilar inflammatory  inflammatory process or pleural effusion. Small effusions have resolved  since previous examination. The liver and spleen are normal. There are  small bilateral fat-containing adrenal nodules consistent bilateral  myolipomas, unchanged. The pancreas is normal. There is no renal mass,  stone or obstruction. There is no ascites,  aneurysm  or retroperitoneal  lymphadenopathy. There is diffuse thickening of the bladder wall,  probably related to the peroneal abscess. There is no inguinal  lymphadenopathy. There are sigmoid diverticula without features of  diverticulitis. The appendix is normal.     Just inferior to the prostate gland and anterior to the distal rectum  there is a 3.0 x 3.2 cm fluid collection with enhancing margins  consistent with an abscess.       Impression:       There is a 3.0 x 3.2 cm fluid collection with enhancing  margins consistent with an abscess related to the inferior aspect of the  prostate gland and anterior to the distal rectum.     D:  07/30/2019  E:  07/31/2019     This report was finalized on 7/31/2019 10:31 AM by Dr. Guillermo Akbar MD.               Impression:     --Acute sepsis per internal medicine admission note, concern for abscess in the perirectal tissue as etiology.  Empiric antibiotics ongoing with risk for mixed infection.  I discussed the case with Dr. Payne and Dr Akbar and Dr Rodriguez has seen; as of 7/31, consensus from these three was no option for drainage;  Dr Payne following closely to determine timing/options for drainage and possible drainage 8/2;  If this is an abscess, then Drainage of abscess will certainly give best chance for cure.  Patient/mom understand that undrained abscess has increased risk for persistence/progression or relapse infection and that antibiotics alone are not a guarantee for cure.  Drainage could also help facilitate microbiologic diagnosis.  If unable to drain, then consideration would need to be given to antibiotics alone and close observation by surgery.  Radiology reports this does not directly involve the prostate, this is not directly involving the perineal soft tissue per them, and there is no evidence for necrotizing soft tissue infection by exam at present.  Chest exam clear at present.  Urine culture pending to help rule out associated UTI.  No other obvious soft tissue  or musculoskeletal focus at present.  No diarrhea.     --Acute perirectal abscess associated with constipation as above.  Colorectal surgery, Dr. Payne following to help guide timing/options/threshold for drainage options either with needle or open at his discretion.       --History urinary retention.  He denies acute retention symptoms.  Has pyuria/hematuria with culture data pending.     --Cutaneous candidiasis.  Mycamine added     --History MRSA     --Diabetes mellitus type 2, uncontrolled.  He reports the reason for this is forgetfulness     --History Johnston and chronic liver disease per past notes     --Left BKA     --Peripheral vascular disease        PLAN:    --IV vancomycin/Zosyn and Mycamine     --Check/review labs cultures and scans     --Discussed with microbiology     --History per nursing staff     --Discussed with Dr. Gonzales     --Discussed with family, partial history per them     --Highly complex set of issues with high risk for further serious morbidity and other serious sequela            Usman Dong MD  8/2/2019

## 2019-08-02 NOTE — ANESTHESIA POSTPROCEDURE EVALUATION
Patient: Kendrick Crystal    Procedure Summary     Date:  08/02/19 Room / Location:   [facility] / [facility]    Anesthesia Start:  1548 Anesthesia Stop:  1634    Procedure:  EXCISION AND DRAIN PERIRECTAL ABSCESS (N/A Anus) Diagnosis:      Surgeon:  Mumtaz Payne MD Provider:  Josue Frankel MD    Anesthesia Type:  general ASA Status:  3          Anesthesia Type: general  Last vitals  BP   93/58   Temp   97.7   Pulse   64   Resp   16     SpO2   92     Post Anesthesia Care and Evaluation    Patient location during evaluation: PACU  Patient participation: complete - patient participated  Level of consciousness: sleepy but conscious  Pain score: 0  Pain management: adequate  Airway patency: patent  Anesthetic complications: No anesthetic complications  PONV Status: none  Cardiovascular status: hemodynamically stable and acceptable  Respiratory status: nonlabored ventilation, acceptable, nasal cannula and oral airway  Hydration status: acceptable

## 2019-08-02 NOTE — NURSING NOTE
"Spoke with Dr. Gonzales via phone r/t elevated FSBS. Reported to Dr. Gonzales pt is eating large box of chicken wings and homemade potato chips from [employer] [employer]. Pt was advised that this food is not considered to be on the CC diet he is prescribed at this time and that his BS have been elevated throughout the day requiring increased insulin at each check. Pt shrugged his shoulders and offered this nurse some chips.Declined offer. Pt is scheduled for OR at 4 pm on 8/2/19 and he and his spouse were notified of time scheduled and stated time surgical nurse stated they would be after the pt. (Around 2pm). Pt was advised a this time that orders for NPO after MN would be ordered. Pt states \"they ain't gonna keep me from eating that long if surgery ain;t till 4pm\". Dr. Lenz notified of pt comment. Pt also reports he \"purchased his prostetic leg for 100.00\". Dr. Lenz was notified of this statement as well. Pt needs reinforcement r/t diet. Nightshift nurse notified of comments as well. No surgical orders noted at this time.   "

## 2019-08-02 NOTE — ANESTHESIA PREPROCEDURE EVALUATION
Anesthesia Evaluation     Patient summary reviewed and Nursing notes reviewed                Airway   Mallampati: II  TM distance: >3 FB  Neck ROM: full  No difficulty expected  Dental - normal exam   (+) edentulous    Pulmonary - negative pulmonary ROS and normal exam   Cardiovascular - normal exam    (+) hypertension, PVD (s/p L BKA), hyperlipidemia,     ROS comment: Echo 5/19: normal EF, mild MR/TR, RVSP 29    Neuro/Psych- negative ROS  GI/Hepatic/Renal/Endo    (+) morbid obesity,  hepatitis (DUNLAP), liver disease, diabetes mellitus (glucose (520) this am; currenlty (160)) type 2 poorly controlled using insulin,     Musculoskeletal     Abdominal  - normal exam    Bowel sounds: normal.   Substance History - negative use     OB/GYN negative ob/gyn ROS         Other   (+) arthritis                     Anesthesia Plan    ASA 3     general     Anesthetic plan, all risks, benefits, and alternatives have been provided, discussed and informed consent has been obtained with: patient.    Plan discussed with CRNA.

## 2019-08-02 NOTE — OP NOTE
HEMORRHOIDECTOMY  Procedure Report    Patient Name:  Kendrick Crystal  YOB: 1970    Date of Surgery:  8/2/2019         Pre-op Diagnosis:   Perirectal abscess      Post-op Diagnosis:     Perirectal abscess    Procedure:   Trans-rectal drainage of perirectal abscess  Staff:  Surgeon(s):  Mumtaz Payne MD         Anesthesia: General with Block    Estimated Blood Loss: 5 mL      Specimen:    Abscess cultures anaerobic and aerobic      Findings: The patient had a 3 cm abscess in the anterior perirectal space approximately 3 cm proximal to the anal verge just below the prostate.          Complications: None        Description of Procedure:   After the patient was properly identified he was brought to the operating room and placed in a comfortable supine position.  Once general anesthesia was obtained and the patient had been intubated he was placed in left lateral position with a beanbag with pressure points padded.  Timeout was performed.  The buttocks were taped apart with 3 inch silk tape and Mastisol.  The anterior rectal wall was exposed.  There was moderately diminished sphincter tone.  A fluctuant mass could be palpated.  This was injected with a 18-gauge spinal needle and purulent material was aspirated.  Using an 11 blade a 1/2 cm incision was made.  This released 6 or 7 cc of white purulent material.  Cultures were taken.  The abscess was digitalized and there were no loculations.  The space was approximately 3 cm in size.  Good hemostasis was noted.  The patient tolerated the procedure well.  The cultures were sent to the laboratory.  The sponge needle count was correct and he was taken to the recovery room in good condition.          Mumtaz Payne MD     Date: 8/2/2019  Time: 4:25 PM

## 2019-08-02 NOTE — PROGRESS NOTES
Continued Stay Note  [facility]     Patient Name: Kendrick Crystal  MRN: [id number]  Today's Date: 8/2/2019    Admit Date: 7/30/2019    Discharge Plan     Row Name 08/02/19 1349       Plan    Plan  Home w/Home Health    Patient/Family in Agreement with Plan  yes    Plan Comments  Discussed patient in am rounds.  Patient will remain here through the weekend.  Spoke with patient in room.  His plan is still to return home with [facility] at discharge.  Will need orders.  He denies any other needs at this time.  CM will continue to follow.  Annmarie Ruelas RN     Final Discharge Disposition Code  06 - home with home health care        Discharge Codes    No documentation.       Expected Discharge Date and Time     Expected Discharge Date Expected Discharge Time    Aug 5, 2019             Annmarie Ruelas RN

## 2019-08-02 NOTE — ANESTHESIA PROCEDURE NOTES
Airway  Urgency: elective    Airway not difficult    General Information and Staff    Patient location during procedure: OR  CRNA: Snehal Carolina CRNA    Indications and Patient Condition  Indications for airway management: airway protection    Preoxygenated: yes  MILS not maintained throughout  Mask difficulty assessment: 1 - vent by mask    Final Airway Details  Final airway type: endotracheal airway      Successful airway: ETT  Cuffed: yes   Successful intubation technique: direct laryngoscopy  Facilitating devices/methods: intubating stylet  Endotracheal tube insertion site: oral  Blade: Paige  Blade size: 2  ETT size (mm): 7.0  Cormack-Lehane Classification: grade I - full view of glottis  Placement verified by: chest auscultation and capnometry   Measured from: lips  ETT to lips (cm): 20  Number of attempts at approach: 1    Additional Comments  Negative epigastric sounds, Breath sound equal bilaterally with symmetric chest rise and fall.  nO TEETH, ATRAUMATIC

## 2019-08-03 LAB
ANION GAP SERPL CALCULATED.3IONS-SCNC: 11 MMOL/L (ref 5–15)
BASOPHILS # BLD AUTO: 0.1 10*3/MM3 (ref 0–0.2)
BASOPHILS NFR BLD AUTO: 0.6 % (ref 0–1.5)
BUN BLD-MCNC: 18 MG/DL (ref 6–20)
BUN/CREAT SERPL: 11.8 (ref 7–25)
CALCIUM SPEC-SCNC: 7.9 MG/DL (ref 8.6–10.5)
CHLORIDE SERPL-SCNC: 101 MMOL/L (ref 98–107)
CK SERPL-CCNC: 36 U/L (ref 20–200)
CO2 SERPL-SCNC: 24 MMOL/L (ref 22–29)
CREAT BLD-MCNC: 1.52 MG/DL (ref 0.76–1.27)
DEPRECATED RDW RBC AUTO: 48.5 FL (ref 37–54)
EOSINOPHIL # BLD AUTO: 0.33 10*3/MM3 (ref 0–0.4)
EOSINOPHIL NFR BLD AUTO: 2 % (ref 0.3–6.2)
ERYTHROCYTE [DISTWIDTH] IN BLOOD BY AUTOMATED COUNT: 15.5 % (ref 12.3–15.4)
GFR SERPL CREATININE-BSD FRML MDRD: 49 ML/MIN/1.73
GLUCOSE BLD-MCNC: 247 MG/DL (ref 65–99)
GLUCOSE BLDC GLUCOMTR-MCNC: 121 MG/DL (ref 70–130)
GLUCOSE BLDC GLUCOMTR-MCNC: 133 MG/DL (ref 70–130)
GLUCOSE BLDC GLUCOMTR-MCNC: 225 MG/DL (ref 70–130)
GLUCOSE BLDC GLUCOMTR-MCNC: 252 MG/DL (ref 70–130)
GLUCOSE BLDC GLUCOMTR-MCNC: 258 MG/DL (ref 70–130)
HCT VFR BLD AUTO: 34.9 % (ref 37.5–51)
HGB BLD-MCNC: 11.1 G/DL (ref 13–17.7)
IMM GRANULOCYTES # BLD AUTO: 0.1 10*3/MM3 (ref 0–0.05)
IMM GRANULOCYTES NFR BLD AUTO: 0.6 % (ref 0–0.5)
LYMPHOCYTES # BLD AUTO: 2.28 10*3/MM3 (ref 0.7–3.1)
LYMPHOCYTES NFR BLD AUTO: 13.8 % (ref 19.6–45.3)
MCH RBC QN AUTO: 27.4 PG (ref 26.6–33)
MCHC RBC AUTO-ENTMCNC: 31.8 G/DL (ref 31.5–35.7)
MCV RBC AUTO: 86.2 FL (ref 79–97)
MONOCYTES # BLD AUTO: 1.34 10*3/MM3 (ref 0.1–0.9)
MONOCYTES NFR BLD AUTO: 8.1 % (ref 5–12)
NEUTROPHILS # BLD AUTO: 12.42 10*3/MM3 (ref 1.7–7)
NEUTROPHILS NFR BLD AUTO: 74.9 % (ref 42.7–76)
NRBC BLD AUTO-RTO: 0 /100 WBC (ref 0–0.2)
PLATELET # BLD AUTO: 230 10*3/MM3 (ref 140–450)
PMV BLD AUTO: 12.4 FL (ref 6–12)
POTASSIUM BLD-SCNC: 4.9 MMOL/L (ref 3.5–5.2)
RBC # BLD AUTO: 4.05 10*6/MM3 (ref 4.14–5.8)
SODIUM BLD-SCNC: 136 MMOL/L (ref 136–145)
VANCOMYCIN TROUGH SERPL-MCNC: 31.4 MCG/ML (ref 5–20)
WBC NRBC COR # BLD: 16.57 10*3/MM3 (ref 3.4–10.8)

## 2019-08-03 PROCEDURE — 82550 ASSAY OF CK (CPK): CPT | Performed by: INTERNAL MEDICINE

## 2019-08-03 PROCEDURE — 25010000002 PIPERACILLIN SOD-TAZOBACTAM PER 1 G: Performed by: INTERNAL MEDICINE

## 2019-08-03 PROCEDURE — 25010000002 HEPARIN (PORCINE) PER 1000 UNITS: Performed by: INTERNAL MEDICINE

## 2019-08-03 PROCEDURE — 85025 COMPLETE CBC W/AUTO DIFF WBC: CPT | Performed by: INTERNAL MEDICINE

## 2019-08-03 PROCEDURE — 25010000002 DAPTOMYCIN PER 1 MG: Performed by: INTERNAL MEDICINE

## 2019-08-03 PROCEDURE — 25010000003 MICAFUNGIN SODIUM 100 MG RECONSTITUTED SOLUTION: Performed by: INTERNAL MEDICINE

## 2019-08-03 PROCEDURE — 80048 BASIC METABOLIC PNL TOTAL CA: CPT | Performed by: INTERNAL MEDICINE

## 2019-08-03 PROCEDURE — 80202 ASSAY OF VANCOMYCIN: CPT

## 2019-08-03 PROCEDURE — 82962 GLUCOSE BLOOD TEST: CPT

## 2019-08-03 PROCEDURE — 63710000001 INSULIN DETEMIR PER 5 UNITS: Performed by: INTERNAL MEDICINE

## 2019-08-03 PROCEDURE — 99233 SBSQ HOSP IP/OBS HIGH 50: CPT | Performed by: INTERNAL MEDICINE

## 2019-08-03 RX ORDER — HEPARIN SODIUM 5000 [USP'U]/ML
5000 INJECTION, SOLUTION INTRAVENOUS; SUBCUTANEOUS EVERY 8 HOURS SCHEDULED
Status: DISCONTINUED | OUTPATIENT
Start: 2019-08-03 | End: 2019-08-14 | Stop reason: HOSPADM

## 2019-08-03 RX ORDER — SODIUM CHLORIDE 9 MG/ML
75 INJECTION, SOLUTION INTRAVENOUS CONTINUOUS
Status: DISCONTINUED | OUTPATIENT
Start: 2019-08-03 | End: 2019-08-07

## 2019-08-03 RX ADMIN — INSULIN DETEMIR 30 UNITS: 100 INJECTION, SOLUTION SUBCUTANEOUS at 09:30

## 2019-08-03 RX ADMIN — Medication 250 MG: at 20:51

## 2019-08-03 RX ADMIN — SODIUM CHLORIDE 125 ML/HR: 9 INJECTION, SOLUTION INTRAVENOUS at 20:51

## 2019-08-03 RX ADMIN — TAZOBACTAM SODIUM AND PIPERACILLIN SODIUM 3.38 G: 375; 3 INJECTION, SOLUTION INTRAVENOUS at 10:43

## 2019-08-03 RX ADMIN — GABAPENTIN 1200 MG: 400 CAPSULE ORAL at 20:51

## 2019-08-03 RX ADMIN — TAMSULOSIN HYDROCHLORIDE 0.8 MG: 0.4 CAPSULE ORAL at 20:51

## 2019-08-03 RX ADMIN — INSULIN LISPRO 4 UNITS: 100 INJECTION, SOLUTION INTRAVENOUS; SUBCUTANEOUS at 09:31

## 2019-08-03 RX ADMIN — DAPTOMYCIN 400 MG: 500 INJECTION, POWDER, LYOPHILIZED, FOR SOLUTION INTRAVENOUS at 12:54

## 2019-08-03 RX ADMIN — DULOXETINE HYDROCHLORIDE 30 MG: 30 CAPSULE, DELAYED RELEASE ORAL at 09:29

## 2019-08-03 RX ADMIN — METOPROLOL TARTRATE 75 MG: 50 TABLET ORAL at 09:29

## 2019-08-03 RX ADMIN — TAZOBACTAM SODIUM AND PIPERACILLIN SODIUM 3.38 G: 375; 3 INJECTION, SOLUTION INTRAVENOUS at 19:02

## 2019-08-03 RX ADMIN — SODIUM CHLORIDE 1000 ML: 9 INJECTION, SOLUTION INTRAVENOUS at 18:59

## 2019-08-03 RX ADMIN — MICAFUNGIN SODIUM 100 MG: 20 INJECTION, POWDER, LYOPHILIZED, FOR SOLUTION INTRAVENOUS at 12:24

## 2019-08-03 RX ADMIN — Medication 250 MG: at 09:29

## 2019-08-03 RX ADMIN — FUROSEMIDE 40 MG: 40 TABLET ORAL at 09:29

## 2019-08-03 RX ADMIN — INSULIN LISPRO 8 UNITS: 100 INJECTION, SOLUTION INTRAVENOUS; SUBCUTANEOUS at 09:30

## 2019-08-03 RX ADMIN — GABAPENTIN 1200 MG: 400 CAPSULE ORAL at 09:29

## 2019-08-03 RX ADMIN — INSULIN LISPRO 8 UNITS: 100 INJECTION, SOLUTION INTRAVENOUS; SUBCUTANEOUS at 12:17

## 2019-08-03 RX ADMIN — HEPARIN SODIUM 5000 UNITS: 5000 INJECTION INTRAVENOUS; SUBCUTANEOUS at 20:51

## 2019-08-03 RX ADMIN — INSULIN LISPRO 6 UNITS: 100 INJECTION, SOLUTION INTRAVENOUS; SUBCUTANEOUS at 12:18

## 2019-08-03 RX ADMIN — HEPARIN SODIUM 5000 UNITS: 5000 INJECTION INTRAVENOUS; SUBCUTANEOUS at 16:41

## 2019-08-03 RX ADMIN — LISINOPRIL 40 MG: 10 TABLET ORAL at 09:30

## 2019-08-03 RX ADMIN — METOPROLOL TARTRATE 75 MG: 50 TABLET ORAL at 20:51

## 2019-08-03 RX ADMIN — SODIUM CHLORIDE, PRESERVATIVE FREE 10 ML: 5 INJECTION INTRAVENOUS at 09:29

## 2019-08-03 RX ADMIN — HYDROCODONE BITARTRATE AND ACETAMINOPHEN 1 TABLET: 10; 325 TABLET ORAL at 05:50

## 2019-08-03 RX ADMIN — SODIUM CHLORIDE 125 ML/HR: 9 INJECTION, SOLUTION INTRAVENOUS at 19:01

## 2019-08-03 NOTE — PROGRESS NOTES
[facility] Medicine Services  PROGRESS NOTE    Patient Name: Kendrick Crystal  : 1970  MRN: [id number]    Date of Admission: 2019  Length of Stay: 4  Primary Care Physician: Keyanna Leone APRN    Subjective   Subjective     CC:  Abscess     HPI:  Went for drainage of abscess yesterday, which went well. Wife reports some improvement this morning but did note confusion overnight. Nursing reports urinary retention overnight, had to bladder scan.     Review of Systems      Otherwise ROS is negative except as mentioned in the HPI.    Objective   Objective     Vital Signs:   Temp:  [97 °F (36.1 °C)-98.2 °F (36.8 °C)] 98.1 °F (36.7 °C)  Heart Rate:  [63-80] 79  Resp:  [16-20] 16  BP: ()/(58-97) 99/68        Physical Exam:  GEN: NAD, resting in bed, awake, obese, slightly less interactive than previous   HEENT: on room air, atraumatic, normocephalic  NECK: supple, no masses  RESP: on room air, normal effort  CV: on tele, sinus rhythm  PSYCH: normal affect, appropriate  NEURO: awake, alert, no focal deficits noted  MSK: no edema noted, left BKA  SKIN: no rashes noted       Results Reviewed:  I have personally reviewed current lab, radiology, and data and agree.    Results from last 7 days   Lab Units 19  0829 19  0728 19  0656 19  0506 19  1631   WBC 10*3/mm3 16.57* 12.17* 11.27* 11.23* 12.13*   HEMOGLOBIN g/dL 11.1* 11.5* 11.9* 12.4* 13.7   HEMATOCRIT % 34.9* 36.0* 37.6 38.1 42.9   PLATELETS 10*3/mm3 230 223 236 235 290   INR   --   --   --  1.02  --    PROCALCITONIN ng/mL  --   --   --   --  0.11     Results from last 7 days   Lab Units 19  0829 19  0727 19  0656  19  1631   SODIUM mmol/L 136 133* 129*   < > 126*   POTASSIUM mmol/L 4.9 4.8 4.7   < > 5.0   CHLORIDE mmol/L 101 99 96*   < > 88*   CO2 mmol/L 24.0 25.0 21.0*   < > 26.0   BUN mg/dL 18 14 23*   < > 19   CREATININE mg/dL 1.52* 0.74* 0.79   < > 0.79   GLUCOSE mg/dL  247* 418* 564*   < > 687*   CALCIUM mg/dL 7.9* 8.3* 8.1*   < > 9.3   ALT (SGPT) U/L  --  24 21  --  19   AST (SGOT) U/L  --  21 22  --  17    < > = values in this interval not displayed.     Estimated Creatinine Clearance: 78.9 mL/min (A) (by C-G formula based on SCr of 1.52 mg/dL (H)).    Microbiology Results Abnormal     Procedure Component Value - Date/Time    Wound Culture - Wound, Rectal Abscess [[id number]] Collected:  08/02/19 1620    Lab Status:  Preliminary result Specimen:  Wound from Rectal Abscess Updated:  08/03/19 1118     Wound Culture Culture in progress     Gram Stain Few (2+) WBCs seen      Occasional Yeast      Rare (1+) Gram negative bacilli    Blood Culture - Blood, Hand, Left [[id number]] Collected:  07/30/19 1845    Lab Status:  Preliminary result Specimen:  Blood from Hand, Left Updated:  08/02/19 2156     Blood Culture No growth at 3 days    Blood Culture - Blood, Arm, Right [[id number]] Collected:  07/30/19 1840    Lab Status:  Preliminary result Specimen:  Blood from Arm, Right Updated:  08/02/19 2156     Blood Culture No growth at 3 days    Urine Culture - Urine, Urine, Clean Catch [[id number]]  (Normal) Collected:  07/30/19 1725    Lab Status:  Final result Specimen:  Urine, Clean Catch Updated:  07/31/19 2019     Urine Culture No growth          Imaging Results (last 24 hours)     ** No results found for the last 24 hours. **          Results for orders placed during the hospital encounter of 05/18/19   Adult Transthoracic Echo Complete W/ Cont if Necessary Per Protocol    Narrative · Estimated EF = 60%.  · Mild mitral valve regurgitation is present  · Mild tricuspid valve regurgitation is present.  · Calculated right ventricular systolic pressure from tricuspid   regurgitation is 29 mmHg.          I have reviewed the medications:  Scheduled Meds:    DAPTOmycin 4 mg/kg (Adjusted) Intravenous Q24H   DULoxetine 30 mg Oral Daily   furosemide 40 mg Oral Daily   gabapentin 1,200 mg Oral TID  "  insulin detemir 30 Units Subcutaneous Q12H   insulin lispro 0-9 Units Subcutaneous 4x Daily With Meals & Nightly   insulin lispro 8 Units Subcutaneous TID With Meals   lisinopril 40 mg Oral Daily   metoprolol tartrate 75 mg Oral Q12H   micafungin (MYCAMINE)  mg Intravenous Q24H   piperacillin-tazobactam 3.375 g Intravenous Q8H   saccharomyces boulardii 250 mg Oral BID   tamsulosin 0.8 mg Oral Nightly     Continuous Infusions:     PRN Meds:.dextrose  •  dextrose  •  glucagon (human recombinant)  •  HYDROcodone-acetaminophen  •  HYDROmorphone  •  ondansetron **OR** ondansetron  •  sodium chloride      Assessment/Plan   Assessment / Plan     Active Hospital Problems    Diagnosis  POA   • **Sepsis (CMS/[facility]) [A41.9]  Yes   • Abscess [L02.91]  Yes   • UTI (urinary tract infection) [N39.0]  Yes   • Hyponatremia [E87.1]  Yes   • DM (diabetes mellitus) type II uncontrolled, periph vascular disorder (CMS/[facility]) [E11.51, E11.65]  Yes   • Peripheral vascular disease (CMS/[facility]) [I73.9]  Yes   • S/P BKA (below knee amputation), left (CMS/[facility]) [Z89.512]  Not Applicable   • History of MRSA infection [Z86.14]  Yes   • Type 2 diabetes mellitus with circulatory disorder and uncontrolled [E11.59]  Yes   • Essential hypertension [I10]  Yes   • Hyperlipemia [E78.5]  Yes   • DUNLAP Cirrhosis [K74.60]  Yes   • Non-compliance [Z91.14]  Not Applicable      Resolved Hospital Problems   No resolved problems to display.        Brief Hospital Course to date:  Kendrick Crystal is a 48 year old gentleman with uncontrolled diabetes who presents with sepsis secondary to abscess inferior to the prostate and anterior to the rectum.          Sepsis, abscess, UTI   -pt meets sepsis criteria based on elevated lactic acid, leukocytosis, tachycardia and positive source  -CT abd/pel obtained: \"3.0 x 3.2 cm fluid collection with enhancing margins consistent with an abscess related to the inferior aspect of the prostate gland and anterior to the distal " "rectum.\"  -ID following for assistance with abx  -abscess drainage done 8/2, cultures pending   -continue IVF; NS @ 100ml/hour    JUAN  -Cr jump to 1.5 today, monitor closely, continue IVF  -some concern for obstruction as urinary retention overnight- monitor very closely, nurses to bladder scan and I/O cath if PVR>500  -d/w Dr Garcia, have stopped vanc as well      Hyponatremia, resolved  -likely 2/2 to hyperglycemia, improving   -s/p 2 liters NS; continue NS @ 100ml/hour  -trend; repeat BMP in am  -treat glucose     Uncontrolled T2DM  -pt reports that he often \"forgets\" to take his routine medications; including his insulin and does not check his glucose daily  -blood sugar >600 on admission, but did not meet criteria for DKA, now improving with subQ insulin   -current regimen -levemir 30 BID and 8 units lispro with meals , SSI  -hem a1c 14 this admission  -diabetes educator consult      HTN  -labile, monitor   -continue routine Lisinopril and Metoprolol w/ hold parameters        DVT prophylaxis:  scd- right leg           Disposition: I expect the patient to be discharged pending     CODE STATUS:   Code Status and Medical Interventions:   Ordered at: 07/30/19 2051     Code Status:    CPR     Medical Interventions (Level of Support Prior to Arrest):    Full         Electronically signed by Brenda Gonzales MD, 08/03/19, 11:36 AM.      "

## 2019-08-03 NOTE — PLAN OF CARE
Problem: Patient Care Overview  Goal: Plan of Care Review  Outcome: Ongoing (interventions implemented as appropriate)   08/03/19 0620   Coping/Psychosocial   Plan of Care Reviewed With patient   OTHER   Outcome Summary Pt had urinary retention over night. BS was 935, I&O cath 1275. Educated pt on bladder training and trying to urinate every 2 hours. Pain well controlled. PRN norco given x2. VSS. Will continue to monitor.

## 2019-08-03 NOTE — PROGRESS NOTES
[facility] Progress Note        Antibiotics:  Anti-Infectives (From admission, onward)    Ordered     Dose/Rate Route Frequency Start Stop    08/03/19 0846  piperacillin-tazobactam (ZOSYN) 3.375 g in iso-osmotic dextrose 50 ml (premix)     Comments:  First dose given in ED.   Ordering Provider:  Usman Dong MD    3.375 g  over 4 Hours Intravenous Every 8 Hours 08/03/19 1000 08/13/19 0959    08/01/19 0844  vancomycin 1500 mg/500 mL 0.9% NS IVPB ([facility])     Shayy Noble RPH reviewed the order on 08/02/19 1301.   Ordering Provider:  Shayy Noble RPH    1,500 mg  over 90 Minutes Intravenous Every 12 Hours 08/01/19 0930 08/08/19 0929    07/31/19 0955  micafungin 100 mg/100 mL 0.9% NS IVPB ([facility])     Ordering Provider:  Usman Dong MD    100 mg  over 60 Minutes Intravenous Every 24 Hours 07/31/19 1100 08/10/19 1059    07/30/19 2051  [MAR Hold]  piperacillin-tazobactam (ZOSYN) 3.375 g in iso-osmotic dextrose 50 ml (premix)     (MAR Hold since 08/02/19 1447)   Comments:  First dose given in ED.   Ordering Provider:  Keyanna Aponte APRN    3.375 g  over 4 Hours Intravenous Every 8 Hours 07/31/19 0100 08/03/19 0059    07/30/19 2051  Pharmacy to dose vancomycin     Shayy Noble RPH reviewed the order on 08/02/19 1301.   Ordering Provider:  Keyanna Aponte APRN     Does not apply Continuous PRN 07/30/19 2047 08/04/19 2046    07/30/19 1820  vancomycin 2250 mg/500 mL 0.9% NS IVPB ([facility])     Ordering Provider:  Kendrick Sotelo MD    20 mg/kg × 113 kg  over 3 Hours Intravenous Once 07/30/19 1822 07/30/19 2155    07/30/19 1820  piperacillin-tazobactam (ZOSYN) 3.375 g in iso-osmotic dextrose 50 ml (premix)     Ordering Provider:  Kendrick Sotelo MD    3.375 g  over 30 Minutes Intravenous Once 07/30/19 1822 07/30/19 1924          CC: rectal pain    HPI:    Patient is a 48 y.o.  Yr old male with history of poorly contolled T2DM, DUNLAP/liver cirrhosis, HTN, PVD/Left BKA, and multiple skin abscesses/MRSA who  presented to [facility] ED with right shin wound infection summer 2018.  He was unable to get to see Dr. Martinez as his father passed away unexpectedly on 18 and he had to wait until after the .  The wound worsened becoming gangrenous and he came to [facility] ED in 2018.  He also had been hospitalized at [facility] and then transferred to  for a severe penis/scrotum infection requiring surgical debridement in summer 2018.  He received antibiotics regarding his right leg infection, generally improved over the remainder of summer 2018 but that area had not completely healed. He was admitted 2019 with acute left lower abdominal wall redness/swelling and pain, spontaneous drainage associated with fever/chills and uncontrolled blood sugars.   I&D requiring wide debridement , severe/deep infection and transferred to [facility] on May 3 with IV Zosyn/oral doxycycline. Cultures had lactobacillus and mixed gram-positive skin sherly including alpha strep, staph epidermidis and corynebacterium. Readmitted to [facility] May 18, 2019, increasing generalized edema ultimately transitioned to oral doxycycline and healed.     He presented to the emergency room 2019 with acute rectal pain that had begun ; this is associated with constipation for days, chills and nausea/dry heaving.  White blood cell count elevated, high hemoglobin A1c over 13, urinalysis with hematuria/pyuria and CT scan with 3 x 3 cm fluid collection anterior to the distal rectum and per discussion with Dr. Akbar/Jennifer, this is not in the prostate.  Colorectal surgery/urology saw him for consideration of surgical options/timing/threshold, etc..     19 I&Dper Dr Payne perirectal abscess    8/3/19 urinary retention overnight requiring in/out catheterization per patient.  He reports some perirectal pain but not progressive , currently dull, nonradiating, better with pain meds, 3 out of 10 at  "present.  He denies hematochezia melena or hematemesis.  No diarrhea.  Recent Constipation as above.  No dysuria hematuria or pyuria.  No hesitancy urgency or flank pain at present.  Prior history urinary retention per his reports.     No headache photophobia or neck stiffness.  No shortness of breath cough or hemoptysis.  No skin rash.       ROS:      8/3/19 No f/c/s. No n/v/d. No rash. No new ADR to Abx.       Constitutional--appetite diminished with malaise.  No sweats   Heent-- No new vision, hearing or throat complaints.  No epistaxis or oral sores.  Denies odynophagia or dysphagia.  No flashers, floaters or eye pain. No odynophagia or dysphagia. No headache, photophobia or neck stiffness.  CV-- No chest pain, palpitation or syncope  Resp-- No SOB/cough/Hemoptysis  GI- No  hematochezia, melena, or hematemesis. Denies jaundice ; he has chronic liver disease  --as above.  No dysuria, hematuria, or flank pain.  Denies hesitancy, urgency or flank pain.  Lymph- no swollen lymph nodes in neck/axilla or groin.   Heme- No active bruising or bleeding; no Hx of DVT or PE.  MS-- no swelling or pain in the bones or joints of arms/legs.  No new back pain.  Neuro-- No acute focal weakness or numbness in the arms or legs.  No seizures.     Full 12 point review of systems reviewed and negative otherwise for acute complaints, except for above          PE:   BP 99/68 (BP Location: Right arm, Patient Position: Lying)   Pulse 79   Temp 98.1 °F (36.7 °C) (Axillary)   Resp 16   Ht 190.5 cm (75\")   Wt 113 kg (250 lb)   SpO2 95%   BMI 31.25 kg/m²       GENERAL: Awake and alert, in no acute distress.   HEENT: Normocephalic, atraumatic.  PERRL. EOMI. No conjunctival injection. No icterus. Oropharynx clear without evidence of thrush or exudate. No evidence of peridontal disease.    NECK: Supple without nuchal rigidity. No mass.  LYMPH: No cervical, axillary or inguinal lymphadenopathy.  HEART: RRR; No murmur, rubs, gallops. "   LUNGS: Clear to auscultation bilaterally without wheezing, rales, rhonchi. Normal respiratory effort. Nonlabored. No dullness.  ABDOMEN: Soft, nontender, nondistended. Positive bowel sounds. No rebound or guarding. NO mass or HSM.  EXT:  No cyanosis, clubbing or edema. No cord.  : Genitalia generally unremarkable.  Unable to elicit any perineal tenderness.  No discrete mass bulge or fluctuance.  No crepitus or bulla.  I am unable to elicit any perianal tenderness with no obvious bulge or fluctuance there either.  Slight candidiasis  MSK: FROM without joint effusions noted arms/legs.    SKIN: Warm and dry without cutaneous eruptions on Inspection/palpation.    NEURO: Oriented to PPT. No focal deficits on motor/sensory exam at arms/legs.     No peripheral stigmata/phenomena of endocarditis           Laboratory Data    Results from last 7 days   Lab Units 08/03/19  0829 08/02/19  0728 08/01/19  0656   WBC 10*3/mm3 16.57* 12.17* 11.27*   HEMOGLOBIN g/dL 11.1* 11.5* 11.9*   HEMATOCRIT % 34.9* 36.0* 37.6   PLATELETS 10*3/mm3 230 223 236     Results from last 7 days   Lab Units 08/03/19  0829   SODIUM mmol/L 136   POTASSIUM mmol/L 4.9   CHLORIDE mmol/L 101   CO2 mmol/L 24.0   BUN mg/dL 18   CREATININE mg/dL 1.52*   GLUCOSE mg/dL 247*   CALCIUM mg/dL 7.9*     Results from last 7 days   Lab Units 08/02/19  0727   ALK PHOS U/L 160*   BILIRUBIN mg/dL <0.2*   ALT (SGPT) U/L 24   AST (SGOT) U/L 21               Estimated Creatinine Clearance: 78.9 mL/min (A) (by C-G formula based on SCr of 1.52 mg/dL (H)).      Microbiology:      Radiology:  Imaging Results (last 72 hours)     Procedure Component Value Units Date/Time    CT Abdomen Pelvis With Contrast [[id number]] Collected:  07/30/19 1749     Updated:  07/31/19 1034    Narrative:       EXAMINATION: CT ABDOMEN PELVIS W CONTRAST- 07/30/2019      INDICATION: lower abd pain     TECHNIQUE: CT scan of the abdomen and pelvis was performed with  intravenous contrast.     The  radiation dose reduction device was turned on for each scan per the  ALARA (As Low as Reasonably Achievable) protocol.     COMPARISON: 05/30/2019     FINDINGS: The most superior images demonstrate no basilar inflammatory  inflammatory process or pleural effusion. Small effusions have resolved  since previous examination. The liver and spleen are normal. There are  small bilateral fat-containing adrenal nodules consistent bilateral  myolipomas, unchanged. The pancreas is normal. There is no renal mass,  stone or obstruction. There is no ascites,  aneurysm or retroperitoneal  lymphadenopathy. There is diffuse thickening of the bladder wall,  probably related to the peroneal abscess. There is no inguinal  lymphadenopathy. There are sigmoid diverticula without features of  diverticulitis. The appendix is normal.     Just inferior to the prostate gland and anterior to the distal rectum  there is a 3.0 x 3.2 cm fluid collection with enhancing margins  consistent with an abscess.       Impression:       There is a 3.0 x 3.2 cm fluid collection with enhancing  margins consistent with an abscess related to the inferior aspect of the  prostate gland and anterior to the distal rectum.     D:  07/30/2019  E:  07/31/2019     This report was finalized on 7/31/2019 10:31 AM by Dr. Guillermo Abkar MD.               Impression:     --Acute sepsis per internal medicine admission note, concern for abscess in the perirectal tissue as etiology.  Empiric antibiotics ongoing with risk for mixed infection.  drainage 8/2; culture data pending.  Broad-spectrum antimicrobials ongoing.     --Acute perirectal abscess associated with constipation as above.  Colorectal surgery, Dr. Payne following to help guide timing/options/threshold for further drainage if needed.  Drainage as above on August 2      --History urinary retention.    Recurrent retention overnight August 2-August 3.  Medicine following to help guide further work-up, urology input,  etc.    --JUAN 8/3;  ?obstruction associated with retention (1200 out with I/O cath per nursing overnight) -v- prerenal -v- vancomycin -v- combination of factors; resuscitation, bladder scans and intervention per Dr Gonzales; vancomycin trough high and vancomycin stopped empirically although high trough potentially accumulation as a consequence of diminishing renal function associated with retention rather than cause.  Nonetheless, avoid for now    --Cutaneous candidiasis.  Mycamine added     --History MRSA     --Diabetes mellitus type 2, uncontrolled.  He reports the reason for this is forgetfulness     --History Johnston and chronic liver disease per past notes     --Left BKA     --Peripheral vascular disease        PLAN:    --IV Zosyn and Mycamine and daptomycin     --Check/review labs cultures and scans     --Discussed with microbiology     --History per nursing staff     --Discussed with Dr. Gonzales     --Discussed with family, partial history per them     --Highly complex set of issues with high risk for further serious morbidity and other serious sequela            Usman Dong MD  8/3/2019

## 2019-08-03 NOTE — PROGRESS NOTES
Kendrick Crystal     LOS: 4 days     Subjective   Patient had urinary retention requiring intermittent catheterization last night.  Tolerating diet.  Afebrile.  White blood cell count 16.5.    Objective     Vital Signs  Vitals:    08/03/19 1148   BP: 94/71   Pulse: 80   Resp: 18   Temp: 97.7 °F (36.5 °C)   SpO2: 99%       I/O  Intake & Output (last day)       08/02 0701 - 08/03 0700 08/03 0701 - 08/04 0700    I.V. (mL/kg) 800 (7.1)     IV Piggyback      Total Intake(mL/kg) 800 (7.1)     Urine (mL/kg/hr) 1275 (0.5)     Total Output 1275     Net -475           Stool Unmeasured Occurrence 1 x           Physical Exam:  Abdomen soft and nontender.           Results Review:       WBC WBC   Date Value Ref Range Status   08/03/2019 16.57 (H) 3.40 - 10.80 10*3/mm3 Final   08/02/2019 12.17 (H) 3.40 - 10.80 10*3/mm3 Final   08/01/2019 11.27 (H) 3.40 - 10.80 10*3/mm3 Final      HGB Hemoglobin   Date Value Ref Range Status   08/03/2019 11.1 (L) 13.0 - 17.7 g/dL Final   08/02/2019 11.5 (L) 13.0 - 17.7 g/dL Final   08/01/2019 11.9 (L) 13.0 - 17.7 g/dL Final      HCT Hematocrit   Date Value Ref Range Status   08/03/2019 34.9 (L) 37.5 - 51.0 % Final   08/02/2019 36.0 (L) 37.5 - 51.0 % Final   08/01/2019 37.6 37.5 - 51.0 % Final      PLT        Results from last 7 days   Lab Units 08/03/19  0829   PLATELETS 10*3/mm3 230            Sodium Sodium   Date Value Ref Range Status   08/03/2019 136 136 - 145 mmol/L Final   08/02/2019 133 (L) 136 - 145 mmol/L Final   08/01/2019 129 (L) 136 - 145 mmol/L Final      Potassium Potassium   Date Value Ref Range Status   08/03/2019 4.9 3.5 - 5.2 mmol/L Final   08/02/2019 4.8 3.5 - 5.2 mmol/L Final   08/01/2019 4.7 3.5 - 5.2 mmol/L Final      Chloride Chloride   Date Value Ref Range Status   08/03/2019 101 98 - 107 mmol/L Final   08/02/2019 99 98 - 107 mmol/L Final   08/01/2019 96 (L) 98 - 107 mmol/L Final      Bicarbonate No results found for: PLASMABICARB   BUN BUN   Date Value Ref Range Status    08/03/2019 18 6 - 20 mg/dL Final   08/02/2019 14 6 - 20 mg/dL Final   08/01/2019 23 (H) 6 - 20 mg/dL Final      Creatinine Creatinine   Date Value Ref Range Status   08/03/2019 1.52 (H) 0.76 - 1.27 mg/dL Final   08/02/2019 0.74 (L) 0.76 - 1.27 mg/dL Final   08/01/2019 0.79 0.76 - 1.27 mg/dL Final      Calcium Calcium   Date Value Ref Range Status   08/03/2019 7.9 (L) 8.6 - 10.5 mg/dL Final   08/02/2019 8.3 (L) 8.6 - 10.5 mg/dL Final   08/01/2019 8.1 (L) 8.6 - 10.5 mg/dL Final        Assessment/Plan       Sepsis (CMS/HCC)    DUNLAP Cirrhosis    Essential hypertension    Non-compliance    Hyperlipemia    Type 2 diabetes mellitus with circulatory disorder and uncontrolled    History of MRSA infection    S/P BKA (below knee amputation), left (CMS/HCC)    Peripheral vascular disease (CMS/HCC)    DM (diabetes mellitus) type II uncontrolled, periph vascular disorder (CMS/HCC)    Abscess    UTI (urinary tract infection)    Hyponatremia    Patient postop day 1 status post transrectal drainage of perirectal abscess.  He has had some difficulty with urinary retention.  He has had a bump in his white blood cell count.  Cultures pending.  Expect white blood cell count to improve now that he is drained.  Will continue to follow for now.  Urinary retention may be secondary to procedure done in the vicinity of the prostate.  Hopefully this will resolve over the next day or so.          Mumtaz Payne MD  08/03/19  12:49 PM

## 2019-08-03 NOTE — PLAN OF CARE
Problem: Patient Care Overview  Goal: Plan of Care Review  Outcome: Ongoing (interventions implemented as appropriate)  VSS, NSR, no c/o pain throughout shift. Pt slept through most of shift. Pt refused to turn/shift weight throughout shift. Pt exhibited attention seeking behavior at times during shift. Bladder scan done, maximum amount on scan was 50 mL's. Pt has not voided during shift. Some sanguinous meatal drainage noted.   08/03/19 1731   Coping/Psychosocial   Plan of Care Reviewed With patient;spouse   Plan of Care Review   Progress no change    Will continue to monitor.    Problem: Skin Injury Risk (Adult)  Goal: Identify Related Risk Factors and Signs and Symptoms  Outcome: Ongoing (interventions implemented as appropriate)   08/03/19 1731   Skin Injury Risk (Adult)   Related Risk Factors (Skin Injury Risk) body weight extremes;infection;mobility impaired;moisture;nutritional deficiencies;tissue perfusion altered     Goal: Skin Health and Integrity  Outcome: Ongoing (interventions implemented as appropriate)  Pt refused to turn or shift weight throughout shift. Red-blanchable area noted on pt's coccyx.

## 2019-08-04 LAB
ALBUMIN SERPL-MCNC: 2.6 G/DL (ref 3.5–5.2)
ALBUMIN/GLOB SERPL: 0.8 G/DL
ALP SERPL-CCNC: 207 U/L (ref 39–117)
ALT SERPL W P-5'-P-CCNC: 26 U/L (ref 1–41)
ANION GAP SERPL CALCULATED.3IONS-SCNC: 14 MMOL/L (ref 5–15)
AST SERPL-CCNC: 23 U/L (ref 1–40)
BACTERIA SPEC AEROBE CULT: NORMAL
BACTERIA SPEC AEROBE CULT: NORMAL
BASOPHILS # BLD AUTO: 0.06 10*3/MM3 (ref 0–0.2)
BASOPHILS NFR BLD AUTO: 0.5 % (ref 0–1.5)
BILIRUB SERPL-MCNC: 0.3 MG/DL (ref 0.2–1.2)
BUN BLD-MCNC: 28 MG/DL (ref 6–20)
BUN/CREAT SERPL: 9.3 (ref 7–25)
CALCIUM SPEC-SCNC: 7.9 MG/DL (ref 8.6–10.5)
CHLORIDE SERPL-SCNC: 99 MMOL/L (ref 98–107)
CO2 SERPL-SCNC: 20 MMOL/L (ref 22–29)
CREAT BLD-MCNC: 3.01 MG/DL (ref 0.76–1.27)
CREAT UR-MCNC: 34.7 MG/DL
DEPRECATED RDW RBC AUTO: 51 FL (ref 37–54)
EOSINOPHIL # BLD AUTO: 0.41 10*3/MM3 (ref 0–0.4)
EOSINOPHIL NFR BLD AUTO: 3.3 % (ref 0.3–6.2)
ERYTHROCYTE [DISTWIDTH] IN BLOOD BY AUTOMATED COUNT: 15.7 % (ref 12.3–15.4)
GFR SERPL CREATININE-BSD FRML MDRD: 22 ML/MIN/1.73
GLOBULIN UR ELPH-MCNC: 3.4 GM/DL
GLUCOSE BLD-MCNC: 268 MG/DL (ref 65–99)
GLUCOSE BLDC GLUCOMTR-MCNC: 137 MG/DL (ref 70–130)
GLUCOSE BLDC GLUCOMTR-MCNC: 166 MG/DL (ref 70–130)
GLUCOSE BLDC GLUCOMTR-MCNC: 210 MG/DL (ref 70–130)
GLUCOSE BLDC GLUCOMTR-MCNC: 258 MG/DL (ref 70–130)
HCT VFR BLD AUTO: 36.5 % (ref 37.5–51)
HGB BLD-MCNC: 11.2 G/DL (ref 13–17.7)
IMM GRANULOCYTES # BLD AUTO: 0.06 10*3/MM3 (ref 0–0.05)
IMM GRANULOCYTES NFR BLD AUTO: 0.5 % (ref 0–0.5)
LYMPHOCYTES # BLD AUTO: 1.34 10*3/MM3 (ref 0.7–3.1)
LYMPHOCYTES NFR BLD AUTO: 10.9 % (ref 19.6–45.3)
MCH RBC QN AUTO: 27.1 PG (ref 26.6–33)
MCHC RBC AUTO-ENTMCNC: 30.7 G/DL (ref 31.5–35.7)
MCV RBC AUTO: 88.2 FL (ref 79–97)
MONOCYTES # BLD AUTO: 0.88 10*3/MM3 (ref 0.1–0.9)
MONOCYTES NFR BLD AUTO: 7.1 % (ref 5–12)
NEUTROPHILS # BLD AUTO: 9.59 10*3/MM3 (ref 1.7–7)
NEUTROPHILS NFR BLD AUTO: 77.7 % (ref 42.7–76)
NRBC BLD AUTO-RTO: 0.2 /100 WBC (ref 0–0.2)
PLATELET # BLD AUTO: 216 10*3/MM3 (ref 140–450)
PMV BLD AUTO: 11.9 FL (ref 6–12)
POTASSIUM BLD-SCNC: 4.9 MMOL/L (ref 3.5–5.2)
PROT SERPL-MCNC: 6 G/DL (ref 6–8.5)
PROT UR-MCNC: 53.5 MG/DL
RBC # BLD AUTO: 4.14 10*6/MM3 (ref 4.14–5.8)
SODIUM BLD-SCNC: 133 MMOL/L (ref 136–145)
URATE SERPL-MCNC: 5 MG/DL (ref 3.4–7)
WBC NRBC COR # BLD: 12.34 10*3/MM3 (ref 3.4–10.8)

## 2019-08-04 PROCEDURE — 82570 ASSAY OF URINE CREATININE: CPT | Performed by: INTERNAL MEDICINE

## 2019-08-04 PROCEDURE — 85025 COMPLETE CBC W/AUTO DIFF WBC: CPT | Performed by: INTERNAL MEDICINE

## 2019-08-04 PROCEDURE — 80053 COMPREHEN METABOLIC PANEL: CPT | Performed by: INTERNAL MEDICINE

## 2019-08-04 PROCEDURE — 63710000001 INSULIN DETEMIR PER 5 UNITS: Performed by: INTERNAL MEDICINE

## 2019-08-04 PROCEDURE — 84156 ASSAY OF PROTEIN URINE: CPT | Performed by: INTERNAL MEDICINE

## 2019-08-04 PROCEDURE — 25010000002 PIPERACILLIN-TAZOBACTAM: Performed by: INTERNAL MEDICINE

## 2019-08-04 PROCEDURE — 82962 GLUCOSE BLOOD TEST: CPT

## 2019-08-04 PROCEDURE — 25010000002 HEPARIN (PORCINE) PER 1000 UNITS: Performed by: INTERNAL MEDICINE

## 2019-08-04 PROCEDURE — 25010000002 PIPERACILLIN SOD-TAZOBACTAM PER 1 G: Performed by: INTERNAL MEDICINE

## 2019-08-04 PROCEDURE — 84550 ASSAY OF BLOOD/URIC ACID: CPT | Performed by: INTERNAL MEDICINE

## 2019-08-04 PROCEDURE — 99233 SBSQ HOSP IP/OBS HIGH 50: CPT | Performed by: INTERNAL MEDICINE

## 2019-08-04 RX ORDER — HEPARIN SODIUM 5000 [USP'U]/ML
5000 INJECTION, SOLUTION INTRAVENOUS; SUBCUTANEOUS EVERY 8 HOURS SCHEDULED
Status: DISCONTINUED | OUTPATIENT
Start: 2019-08-04 | End: 2019-08-04

## 2019-08-04 RX ADMIN — INSULIN DETEMIR 30 UNITS: 100 INJECTION, SOLUTION SUBCUTANEOUS at 08:04

## 2019-08-04 RX ADMIN — HEPARIN SODIUM 5000 UNITS: 5000 INJECTION INTRAVENOUS; SUBCUTANEOUS at 13:58

## 2019-08-04 RX ADMIN — INSULIN LISPRO 2 UNITS: 100 INJECTION, SOLUTION INTRAVENOUS; SUBCUTANEOUS at 17:24

## 2019-08-04 RX ADMIN — LISINOPRIL 40 MG: 10 TABLET ORAL at 08:06

## 2019-08-04 RX ADMIN — GABAPENTIN 1200 MG: 400 CAPSULE ORAL at 08:06

## 2019-08-04 RX ADMIN — MICAFUNGIN SODIUM 100 MG: 20 INJECTION, POWDER, LYOPHILIZED, FOR SOLUTION INTRAVENOUS at 10:08

## 2019-08-04 RX ADMIN — TAZOBACTAM SODIUM AND PIPERACILLIN SODIUM 3.38 G: 375; 3 INJECTION, SOLUTION INTRAVENOUS at 02:02

## 2019-08-04 RX ADMIN — SODIUM CHLORIDE, PRESERVATIVE FREE 10 ML: 5 INJECTION INTRAVENOUS at 08:07

## 2019-08-04 RX ADMIN — METOPROLOL TARTRATE 75 MG: 50 TABLET ORAL at 23:01

## 2019-08-04 RX ADMIN — INSULIN LISPRO 8 UNITS: 100 INJECTION, SOLUTION INTRAVENOUS; SUBCUTANEOUS at 17:23

## 2019-08-04 RX ADMIN — TAMSULOSIN HYDROCHLORIDE 0.8 MG: 0.4 CAPSULE ORAL at 23:01

## 2019-08-04 RX ADMIN — INSULIN LISPRO 8 UNITS: 100 INJECTION, SOLUTION INTRAVENOUS; SUBCUTANEOUS at 08:03

## 2019-08-04 RX ADMIN — INSULIN LISPRO 8 UNITS: 100 INJECTION, SOLUTION INTRAVENOUS; SUBCUTANEOUS at 12:27

## 2019-08-04 RX ADMIN — TAZOBACTAM SODIUM AND PIPERACILLIN SODIUM 3.38 G: 375; 3 INJECTION, SOLUTION INTRAVENOUS at 09:52

## 2019-08-04 RX ADMIN — Medication 250 MG: at 23:01

## 2019-08-04 RX ADMIN — HEPARIN SODIUM 5000 UNITS: 5000 INJECTION INTRAVENOUS; SUBCUTANEOUS at 23:02

## 2019-08-04 RX ADMIN — INSULIN LISPRO 4 UNITS: 100 INJECTION, SOLUTION INTRAVENOUS; SUBCUTANEOUS at 12:28

## 2019-08-04 RX ADMIN — SODIUM CHLORIDE 125 ML/HR: 9 INJECTION, SOLUTION INTRAVENOUS at 02:02

## 2019-08-04 RX ADMIN — Medication 250 MG: at 08:06

## 2019-08-04 RX ADMIN — DULOXETINE HYDROCHLORIDE 30 MG: 30 CAPSULE, DELAYED RELEASE ORAL at 10:07

## 2019-08-04 RX ADMIN — PIPERACILLIN AND TAZOBACTAM 2.25 G: 2; .25 INJECTION, POWDER, LYOPHILIZED, FOR SOLUTION INTRAVENOUS; PARENTERAL at 17:30

## 2019-08-04 RX ADMIN — HEPARIN SODIUM 5000 UNITS: 5000 INJECTION INTRAVENOUS; SUBCUTANEOUS at 05:19

## 2019-08-04 RX ADMIN — METOPROLOL TARTRATE 75 MG: 50 TABLET ORAL at 08:06

## 2019-08-04 RX ADMIN — HYDROCODONE BITARTRATE AND ACETAMINOPHEN 1 TABLET: 10; 325 TABLET ORAL at 10:15

## 2019-08-04 RX ADMIN — INSULIN LISPRO 6 UNITS: 100 INJECTION, SOLUTION INTRAVENOUS; SUBCUTANEOUS at 08:03

## 2019-08-04 NOTE — NURSING NOTE
Joseph Score: 15, pt continues to refuse to turn in bed and refuses heel boot, buttox is red and blanchable today. Pt has several, severe co-morbidities.  WOC consult placed, contacted House-Supervisor (Kendrick) and requested specialty bed to be ordered for pt. [facility].

## 2019-08-04 NOTE — PROGRESS NOTES
[facility] Progress Note        Antibiotics:  Anti-Infectives (From admission, onward)    Ordered     Dose/Rate Route Frequency Start Stop    19 1000  DAPTOmycin (CUBICIN) 400 mg in sodium chloride 0.9 % 50 mL IVPB     Ordering Provider:  Usman Dong MD    4 mg/kg × 95.9 kg (Adjusted)  100 mL/hr over 30 Minutes Intravenous Every 48 Hours 19 1300 19 1259    19 1002  piperacillin-tazobactam (ZOSYN) 2.25 g/100 mL 0.9% NS IVPB (mbp)     Comments:  First dose given in ED.   Ordering Provider:  Usman Dong MD    2.25 g  over 4 Hours Intravenous Every 8 Hours 19 1752 19 1759    19 0955  micafungin 100 mg/100 mL 0.9% NS IVPB (mbp)     Ordering Provider:  Usman Dong MD    100 mg  over 60 Minutes Intravenous Every 24 Hours 19 1100 08/10/19 1059    19 2051  [MAR Hold]  piperacillin-tazobactam (ZOSYN) 3.375 g in iso-osmotic dextrose 50 ml (premix)     (MAR Hold since 19 1447)   Comments:  First dose given in ED.   Ordering Provider:  Keyanna Aponte APRN    3.375 g  over 4 Hours Intravenous Every 8 Hours 19 0100 19 0059    19 1820  vancomycin 2250 mg/500 mL 0.9% NS IVPB ([facility])     Ordering Provider:  Kendrick Sotelo MD    20 mg/kg × 113 kg  over 3 Hours Intravenous Once 19 1822 19 2155    19 1820  piperacillin-tazobactam (ZOSYN) 3.375 g in iso-osmotic dextrose 50 ml (premix)     Ordering Provider:  Kendrick Sotelo MD    3.375 g  over 30 Minutes Intravenous Once 19 1822 19 1924          CC: rectal pain    HPI:    Patient is a 48 y.o.  Yr old male with history of poorly contolled T2DM, DUNLAP/liver cirrhosis, HTN, PVD/Left BKA, and multiple skin abscesses/MRSA who presented to [facility] ED with right shin wound infection summer 2018.  He was unable to get to see Dr. Martinez as his father passed away unexpectedly on 18 and he had to wait until after the .  The wound worsened becoming gangrenous  and he came to [facility] ED in August 2018.  He also had been hospitalized at [facility] and then transferred to  for a severe penis/scrotum infection requiring surgical debridement in summer 2018.  He received antibiotics regarding his right leg infection, generally improved over the remainder of summer 2018 but that area had not completely healed. He was admitted April 24, 2019 with acute left lower abdominal wall redness/swelling and pain, spontaneous drainage associated with fever/chills and uncontrolled blood sugars.  4/26 I&D requiring wide debridement , severe/deep infection and transferred to [facility] on May 3 with IV Zosyn/oral doxycycline. Cultures had lactobacillus and mixed gram-positive skin sherly including alpha strep, staph epidermidis and corynebacterium. Readmitted to [facility] May 18, 2019, increasing generalized edema ultimately transitioned to oral doxycycline and healed.     He presented to the emergency room July 30, 2019 with acute rectal pain that had begun 7/27; this is associated with constipation for days, chills and nausea/dry heaving.  White blood cell count elevated, high hemoglobin A1c over 13, urinalysis with hematuria/pyuria and CT scan with 3 x 3 cm fluid collection anterior to the distal rectum and per discussion with Dr. Akbar/Jennifer, this is not in the prostate.  Colorectal surgery/urology saw him for consideration of surgical options/timing/threshold, etc..     8/2/19 I&Dper Dr Payne perirectal abscess; patient reports that he had instrumented his rectum prior to hospitalization with enema    8/3/19 urinary retention overnight requiring in/out catheterization per patient.  Creat climb    8/4/19 further creat climb; childs placed and lisinopril stopped    8/4/19  He reports some perirectal pain but not progressive , currently dull, nonradiating, better with pain meds, 3 out of 10 at present.  He denies hematochezia melena or hematemesis.  No  "diarrhea.  Recent Constipation as above.  No dysuria hematuria or pyuria.  No hesitancy urgency or flank pain at present.  Prior history urinary retention per his reports.     No headache photophobia or neck stiffness.  No shortness of breath cough or hemoptysis.  No skin rash.       ROS:      8/4/19 No f/c/s. No n/v/d. No rash. No new ADR to Abx.       Constitutional--appetite diminished with malaise.  No sweats   Heent-- No new vision, hearing or throat complaints.  No epistaxis or oral sores.  Denies odynophagia or dysphagia.  No flashers, floaters or eye pain. No odynophagia or dysphagia. No headache, photophobia or neck stiffness.  CV-- No chest pain, palpitation or syncope  Resp-- No SOB/cough/Hemoptysis  GI- No  hematochezia, melena, or hematemesis. Denies jaundice ; he has chronic liver disease  --as above.  No dysuria, hematuria, or flank pain.  Denies hesitancy, urgency or flank pain.  Lymph- no swollen lymph nodes in neck/axilla or groin.   Heme- No active bruising or bleeding; no Hx of DVT or PE.  MS-- no swelling or pain in the bones or joints of arms/legs.  No new back pain.  Neuro-- No acute focal weakness or numbness in the arms or legs.  No seizures.     Full 12 point review of systems reviewed and negative otherwise for acute complaints, except for above          PE:   /94   Pulse 78   Temp 98.4 °F (36.9 °C) (Axillary)   Resp 18   Ht 190.5 cm (75\")   Wt 113 kg (250 lb)   SpO2 98%   BMI 31.25 kg/m²       GENERAL: Awake and alert, in no acute distress.   HEENT: Normocephalic, atraumatic.  PERRL. EOMI. No conjunctival injection. No icterus. Oropharynx clear without evidence of thrush or exudate. No evidence of peridontal disease.    NECK: Supple without nuchal rigidity. No mass.  LYMPH: No cervical, axillary or inguinal lymphadenopathy.  HEART: RRR; No murmur, rubs, gallops.   LUNGS: Clear to auscultation bilaterally without wheezing, rales, rhonchi. Normal respiratory effort. " Nonlabored. No dullness.  ABDOMEN: Soft, nontender, nondistended. Positive bowel sounds. No rebound or guarding. NO mass or HSM.  EXT:  No cyanosis, clubbing or edema. No cord.  : Genitalia generally unremarkable.  Unable to elicit any perineal tenderness.  No discrete mass bulge or fluctuance.  No crepitus or bulla.  I am unable to elicit any perianal tenderness with no obvious bulge or fluctuance there either.  Slight candidiasis  MSK: FROM without joint effusions noted arms/legs.    SKIN: Warm and dry without cutaneous eruptions on Inspection/palpation.    NEURO: Oriented to PPT. No focal deficits on motor/sensory exam at arms/legs.     No peripheral stigmata/phenomena of endocarditis           Laboratory Data    Results from last 7 days   Lab Units 08/04/19  0855 08/03/19  0829 08/02/19  0728   WBC 10*3/mm3 12.34* 16.57* 12.17*   HEMOGLOBIN g/dL 11.2* 11.1* 11.5*   HEMATOCRIT % 36.5* 34.9* 36.0*   PLATELETS 10*3/mm3 216 230 223     Results from last 7 days   Lab Units 08/04/19  0855   SODIUM mmol/L 133*   POTASSIUM mmol/L 4.9   CHLORIDE mmol/L 99   CO2 mmol/L 20.0*   BUN mg/dL 28*   CREATININE mg/dL 3.01*   GLUCOSE mg/dL 268*   CALCIUM mg/dL 7.9*     Results from last 7 days   Lab Units 08/04/19  0855   ALK PHOS U/L 207*   BILIRUBIN mg/dL 0.3   ALT (SGPT) U/L 26   AST (SGOT) U/L 23               Estimated Creatinine Clearance: 39.8 mL/min (A) (by C-G formula based on SCr of 3.01 mg/dL (H)).      Microbiology:      Radiology:  Imaging Results (last 72 hours)     Procedure Component Value Units Date/Time    CT Abdomen Pelvis With Contrast [[id number]] Collected:  07/30/19 1749     Updated:  07/31/19 1034    Narrative:       EXAMINATION: CT ABDOMEN PELVIS W CONTRAST- 07/30/2019      INDICATION: lower abd pain     TECHNIQUE: CT scan of the abdomen and pelvis was performed with  intravenous contrast.     The radiation dose reduction device was turned on for each scan per the  ALARA (As Low as Reasonably Achievable)  protocol.     COMPARISON: 05/30/2019     FINDINGS: The most superior images demonstrate no basilar inflammatory  inflammatory process or pleural effusion. Small effusions have resolved  since previous examination. The liver and spleen are normal. There are  small bilateral fat-containing adrenal nodules consistent bilateral  myolipomas, unchanged. The pancreas is normal. There is no renal mass,  stone or obstruction. There is no ascites,  aneurysm or retroperitoneal  lymphadenopathy. There is diffuse thickening of the bladder wall,  probably related to the peroneal abscess. There is no inguinal  lymphadenopathy. There are sigmoid diverticula without features of  diverticulitis. The appendix is normal.     Just inferior to the prostate gland and anterior to the distal rectum  there is a 3.0 x 3.2 cm fluid collection with enhancing margins  consistent with an abscess.       Impression:       There is a 3.0 x 3.2 cm fluid collection with enhancing  margins consistent with an abscess related to the inferior aspect of the  prostate gland and anterior to the distal rectum.     D:  07/30/2019  E:  07/31/2019     This report was finalized on 7/31/2019 10:31 AM by Dr. Guillermo Akbar MD.               Impression:     --Acute sepsis per internal medicine admission note, concern for abscess in the perirectal tissue as etiology.  Empiric antibiotics ongoing with risk for mixed infection.  drainage 8/2; culture data pending.  Broad-spectrum antimicrobials ongoing.     --Acute perirectal abscess associated with constipation as above.  Colorectal surgery, Dr. Payne following to help guide timing/options/threshold for further drainage if needed.  Drainage as above on August 2      --History urinary retention.    Recurrent retention overnight August 2-August 3.  Medicine following to help guide further work-up, urology input, etc.    --ARF 8/3-84;  ?obstruction associated with retention (1200 out with I/O cath per nursing overnight) -v-  contrast from CT -v- lisinopril/medication/vancomycin -v- likely combination of factors; resuscitation, bladder scans and intervention per Dr Gonzales; vancomycin trough high and vancomycin stopped empirically 8/3 although high trough potentially accumulation as a consequence of diminishing renal function associated with retention/contrast rather than cause.  Nonetheless, avoid for now    --Cutaneous candidiasis.  Mycamine added     --History MRSA     --Diabetes mellitus type 2, uncontrolled.  He reports the reason for this is forgetfulness     --History Johnston and chronic liver disease per past notes     --Left BKA     --Peripheral vascular disease        PLAN:    --IV Zosyn and Mycamine and daptomycin     --Check/review labs cultures and scans     --Discussed with microbiology     --History per nursing staff     --Discussed with Dr. Gonzales/leydi     --Discussed with family, partial history per them     --Highly complex set of issues with high risk for further serious morbidity and other serious sequela    --nephrology to see            Usman Dong MD  8/4/2019

## 2019-08-04 NOTE — PLAN OF CARE
Problem: Patient Care Overview  Goal: Plan of Care Review  Outcome: Ongoing (interventions implemented as appropriate)  VSS, NSR, Room air. Walker cath placed, pt had UOP.   08/04/19 2341   Coping/Psychosocial   Plan of Care Reviewed With patient;spouse   Plan of Care Review   Progress improving    Dolphin-specialty bed obtained. Norco given x 1. Minimal c/o pain. Will continue to monitor.

## 2019-08-04 NOTE — CONSULTS
NAL Consult Note    Kendrick Crystal  1970  [id number]    Date of Admit:  2019    Date of Consult: 2019        Requesting Provider: No ref. provider found  Evaluating Physician: Jaime Rubio MD        Reason for Consultation: JUAN    History of present illness:    Patient is a 48 y.o.  Yr old male with history of poorly contolled T2DM, DUNLAP/liver cirrhosis, HTN, PVD/Left BKA, and multiple skin abscesses/MRSA who presented to [facility] ED with right shin wound infection summer 2018.  He was unable to get to see Dr. Martinez as his father passed away unexpectedly on 18 and he had to wait until after the .  The wound worsened becoming gangrenous and he came to [facility] ED in 2018.  He also had been hospitalized at [facility] and then transferred to  for a severe penis/scrotum infection requiring surgical debridement in summer 2018.  He received antibiotics regarding his right leg infection, generally improved over the remainder of summer 2018 but that area had not completely healed. He was admitted 2019 with acute left lower abdominal wall redness/swelling and pain, spontaneous drainage associated with fever/chills and uncontrolled blood sugars.   I&D requiring wide debridement , severe/deep infection and transferred to [facility] on May 3 with IV Zosyn/oral doxycycline. Cultures had lactobacillus and mixed gram-positive skin sherly including alpha strep, staph epidermidis and corynebacterium. Readmitted to [facility] May 18, 2019, increasing generalized edema ultimately transitioned to oral doxycycline and healed.     He presented to the emergency room 2019 with acute rectal pain that had begun ; this is associated with constipation for days, chills and nausea/dry heaving.  White blood cell count elevated, high hemoglobin A1c over 13, urinalysis with hematuria/pyuria and CT scan with 3 x 3 cm fluid collection anterior to the distal  rectum-he received contrast-he was started on vancomycin in addition to other antibiotics.  His creatinine baseline is 0.8 start rising the last 48 hours to 1.5 and then today about 3        Past Medical History:   Diagnosis Date   • Abdominal wall cellulitis 5/20/2019   • JUAN (acute kidney injury) (CMS/HCC) 7/29/2018   • Arthritis    • Bipolar 1 disorder (CMS/HCC)    • Cellulitis of right anterior lower leg 7/29/2018   • Cirrhosis (CMS/HCC)    • Counseling for insulin pump    • Depression    • Diabetes mellitus (CMS/HCC)    • Encephalopathy, hepatic (CMS/HCC)    • H/O degenerative disc disease    • History of Lissa's gangrene     right leg/testicle   • Hyperlipemia    • Hypertension    • multiple Skin abscesses    • DUNLAP, bx showed stage IV fibrosis    • Neuropathy    • Peripheral vascular disease (CMS/HCC)    • Thrombophlebitis        Past Surgical History:   Procedure Laterality Date   • ABDOMINAL WALL ABSCESS INCISION AND DRAINAGE N/A 4/26/2019    Procedure: ABDOMINAL WALL DEBRIDEMENT;  Surgeon: Fadi Kern MD;  Location:  [facility] OR;  Service: General   • AMPUTATION DIGIT Left 1/30/2017    Procedure: left fourth and fifth transmetatarsal toe amputation ;  Surgeon: Juancho Martinez MD;  Location:  [facility] OR;  Service:    • BELOW KNEE AMPUTATION Left 3/2/2017    Procedure: AMPUTATION BELOW KNEE, SHMUEL;  Surgeon: Juancho Martinez MD;  Location:  [facility] OR;  Service:    • ENDOSCOPY     • LEG SURGERY     • TESTICLE SURGERY Right     DEBRIDEMENT FROM GANGRENE   • TONSILLECTOMY  1975       Social History     Socioeconomic History   • Marital status:      Spouse name: Not on file   • Number of children: 1   • Years of education: Not on file   • Highest education level: Not on file   Occupational History   • Occupation: Security     Comment: Disabled-Diabetic Retinopathy   Tobacco Use   • Smoking status: Never Smoker   • Smokeless tobacco: Never Used   Substance and Sexual Activity   • Alcohol use: No   •  Drug use: No   • Sexual activity: Defer   Social History Narrative    L[facility] in [facility]       family history includes Arthritis in his father and mother; Diabetes in his brother and father; Heart attack in his father; Hyperlipidemia in his father; Hypertension in his brother, father, and mother; Kidney disease in his mother; Mental illness in his sister; Obesity in his brother; Stroke in his mother.    Allergies   Allergen Reactions   • No Known Drug Allergy        Medication:    Current Facility-Administered Medications:   •  [START ON 8/5/2019] DAPTOmycin (CUBICIN) 400 mg in sodium chloride 0.9 % 50 mL IVPB, 4 mg/kg (Adjusted), Intravenous, Q48H, Usman Dong MD  •  dextrose (D50W) 25 g/ 50mL Intravenous Solution 25 g, 25 g, Intravenous, Q15 Min PRN, Keyanna Aponte APRN  •  dextrose (GLUTOSE) oral gel 15 g, 15 g, Oral, Q15 Min PRN, Keyanna Aponte APRN  •  DULoxetine (CYMBALTA) DR capsule 30 mg, 30 mg, Oral, Daily, Keyanna Aponte APRN, 30 mg at 08/04/19 1007  •  glucagon (human recombinant) (GLUCAGEN DIAGNOSTIC) injection 1 mg, 1 mg, Subcutaneous, PRN, Keyanna Aponte APRN  •  heparin (porcine) 5000 UNIT/ML injection 5,000 Units, 5,000 Units, Subcutaneous, Q8H, Brenda Gonzales MD, 5,000 Units at 08/04/19 0519  •  HYDROcodone-acetaminophen (NORCO)  MG per tablet 1 tablet, 1 tablet, Oral, Q6H PRN, Keyanna Aponte APRN, 1 tablet at 08/04/19 1015  •  HYDROmorphone (DILAUDID) injection 1 mg, 1 mg, Intravenous, Q3H PRN, Keyanna Aponte APRN  •  insulin detemir (LEVEMIR) injection 30 Units, 30 Units, Subcutaneous, Q12H, Brenda Gonzales MD, 30 Units at 08/04/19 0804  •  insulin lispro (humaLOG) injection 0-9 Units, 0-9 Units, Subcutaneous, 4x Daily With Meals & Nightly, Keyanna Aponte APRN, 4 Units at 08/04/19 1228  •  insulin lispro (humaLOG) injection 8 Units, 8 Units, Subcutaneous, TID With Meals, Bredna Gonzales MD, 8 Units at 08/04/19 1227  •  metoprolol tartrate (LOPRESSOR) tablet 75 mg, 75  mg, Oral, Q12H, Keyanna Aponte APRN, 75 mg at 08/04/19 0806  •  micafungin 100 mg/100 mL 0.9% NS IVPB (mbp), 100 mg, Intravenous, Q24H, Usman Dong MD, 100 mg at 08/04/19 1008  •  ondansetron (ZOFRAN) tablet 4 mg, 4 mg, Oral, Q6H PRN **OR** ondansetron (ZOFRAN) injection 4 mg, 4 mg, Intravenous, Q6H PRN, Keyanna Aponte APRN  •  piperacillin-tazobactam (ZOSYN) 2.25 g/100 mL 0.9% NS IVPB (mbp), 2.25 g, Intravenous, Q8H, Usman Dong MD  •  saccharomyces boulardii (FLORASTOR) capsule 250 mg, 250 mg, Oral, BID, Keyanna Aponte APRN, 250 mg at 08/04/19 0806  •  sodium chloride 0.9 % flush 10 mL, 10 mL, Intravenous, PRN, SoteloKendrick MD, 10 mL at 08/04/19 0807  •  sodium chloride 0.9 % infusion, 125 mL/hr, Intravenous, Continuous, Brenda Gonzales MD, Last Rate: 125 mL/hr at 08/04/19 0202, 125 mL/hr at 08/04/19 0202  •  tamsulosin (FLOMAX) 24 hr capsule 0.8 mg, 0.8 mg, Oral, Nightly, Keyanna Aponte APRN, 0.8 mg at 08/03/19 2051    Medications Prior to Admission   Medication Sig Dispense Refill Last Dose   • DULoxetine (CYMBALTA) 30 MG capsule Take 1 capsule by mouth Daily. 90 capsule 1 7/29/2019 at Unknown time   • furosemide (LASIX) 40 MG tablet Take 40 mg by mouth Daily.      • gabapentin (NEURONTIN) 400 MG capsule Take 3 capsules by mouth 3 (Three) Times a Day.   7/29/2019 at Unknown time   • HYDROcodone-acetaminophen (NORCO)  MG per tablet Take 1 tablet by mouth 3 (Three) Times a Day As Needed for Moderate Pain  or Severe Pain .   7/29/2019 at Unknown time   • insulin lispro (humaLOG) 100 UNIT/ML injection Inject 5 Units under the skin into the appropriate area as directed 4 (Four) Times a Day With Meals & at Bedtime. Plus sliding scale 60 mL 5 7/29/2019 at Unknown time   • LANTUS 100 UNIT/ML injection Inject 30 Units under the skin into the appropriate area as directed 2 (Two) Times a Day. 60 mL 1 7/29/2019 at Unknown time   • lisinopril (PRINIVIL,ZESTRIL) 40 MG tablet Take 40 mg by  "mouth Daily.   7/29/2019 at Unknown time   • Metoprolol Tartrate 75 MG tablet Take 75 mg by mouth Every 12 (Twelve) Hours. 180 tablet 1 7/29/2019 at Unknown time   • tamsulosin (FLOMAX) 0.4 MG capsule 24 hr capsule Take 2 capsules by mouth Every Night. 180 capsule 1 7/29/2019 at Unknown time   • Lactobacillus tablet Take 1 tablet by mouth 2 (Two) Times a Day. (Patient taking differently: Take 1 tablet by mouth 2 (Two) Times a Day. Pt states that he is no longer taking) 60 tablet 11 Taking       Review of Systems:    Constitutional-- No Fever, chills or sweats. No significant change in weight  Eye-- no diplopia, no conjunctivitis  ENT-- No new hearing or throat complaints.  No epistaxis or oral sores. No odynophagia or dysphagia. No headache, photophobia or neck stiffness.  CV-- No chest pain, palpitations, soa, or edema  Resp-- No SOB/cough/Hemoptysis  GI- No nausea, vomiting, or diarrhea.  No hematochezia, melena, or hematemesis.  -- No dysuria, hematuria, or flank pain. No lower tract obstructive symptoms-  Walker catheter in place   skin-- No rash, warm and dry  Lymph- no painful or swollen lymph nodes in neck/axilla or groin.   Heme- No active bruising or bleeding; no Hx of DVT or PE.  MS-- no swelling or pain in the joints  Neuro-- No acute focal weakness or numbness in the arms or legs.  No seizures.  Psych--No anxiety or depression  Endo--No cold or heat intolerance.  No polyuria, polydipsia, or polyphagia    Full review of systems reviewed and negative otherwise for acute complaints    Physical Exam:   Vital Signs   Blood pressure 161/94, pulse 78, temperature 98.4 °F (36.9 °C), temperature source Axillary, resp. rate 18, height 190.5 cm (75\"), weight 113 kg (250 lb), SpO2 98 %.     GENERAL: Awake and alert, in no acute distress.   HEENT: Normocephalic, atraumatic.  PER.  No conjunctivitis. No icterus. Oropharynx clear without evidence of thrush or exudate. No evidence of peridontal disease.    NECK: Supple " without nuchal rigidity. No mass.  LYMPH: No painful cervical, axillary or inguinal lymphadenopathy.  HEART: RRR; No murmur, rubs, gallops. No bruits in neck, abdomen, or groins, no edema  LUNGS: Normal respiratory effort. Nonlabored. No dullness.  No crepitus.  Clear to auscultation bilaterally without wheezing, rales, rhonchi.  ABDOMEN: Soft, nontender, nondistended. Positive bowel sounds. No rebound or guarding. NO mass or HSM.  JOINTS:  Full range of motion, no redness or tenderness.  EXT:  No cyanosis, clubbing or edema.  Amputation  : Walker in place  SKIN: Warm and dry without rash  NEURO: Oriented to PPT. No focal neurological deficits. Strength equal bilateral  PSYCHIATRIC: Normal insight and judgement. Cooperative with PE    Laboratory Data  Results from last 7 days   Lab Units 08/04/19  0855 08/03/19  0829 08/02/19  0728   HEMOGLOBIN g/dL 11.2* 11.1* 11.5*   HEMATOCRIT % 36.5* 34.9* 36.0*     Results from last 7 days   Lab Units 08/04/19  0855 08/03/19  0829 08/02/19  0727 08/01/19  0656 07/31/19  0506 07/30/19  1631   SODIUM mmol/L 133* 136 133* 129* 131* 126*   POTASSIUM mmol/L 4.9 4.9 4.8 4.7 4.5 5.0   CHLORIDE mmol/L 99 101 99 96* 94* 88*   CO2 mmol/L 20.0* 24.0 25.0 21.0* 24.0 26.0   BUN mg/dL 28* 18 14 23* 19 19   CREATININE mg/dL 3.01* 1.52* 0.74* 0.79 0.82 0.79   CALCIUM mg/dL 7.9* 7.9* 8.3* 8.1* 8.3* 9.3   MAGNESIUM mg/dL  --   --   --   --  1.9  --    ALBUMIN g/dL 2.60*  --  2.80* 2.70*  --  3.30*     Results from last 7 days   Lab Units 08/04/19  0855   GLUCOSE mg/dL 268*     Results from last 7 days   Lab Units 07/30/19  1725   COLOR UA  Yellow   CLARITY UA  Turbid*   PH, URINE  <=5.0   SPECIFIC GRAVITY, URINE  >=1.030   GLUCOSE UA  >=1000 mg/dL (3+)*   KETONES UA  Negative   BILIRUBIN UA  Negative   PROTEIN UA  30 mg/dL (1+)*   BLOOD UA  Moderate (2+)*   LEUKOCYTES UA  Moderate (2+)*   NITRITE UA  Negative     Results from last 7 days   Lab Units 08/04/19  0855   ALK PHOS U/L 207*    BILIRUBIN mg/dL 0.3   ALT (SGPT) U/L 26   AST (SGOT) U/L 23     Estimated Creatinine Clearance: 39.8 mL/min (A) (by C-G formula based on SCr of 3.01 mg/dL (H)).    Radiology:      Renal Imaging:    I personally read  the radiographic studies    Impression:   Acute kidney injury-likely related to ATN there is a high likelihood that this is related to vancomycin toxicity history of vancomycin level was quite high but this is also a multifactorial issue as his blood pressure was relatively low in the last 48 hours in addition he was taking lisinopril  Chronic kidney disease he does have recently started having protein in his urine  Hypertension  Poorly controlled diabetes  Peripheral vascular disease    PLAN: Thank you for asking us to see Kendrick Crystal, I recommend the following:  Agree with stopping lisinopril  We will order baseline labs  Continue to avoid any nephrotoxic agents and adjust medication according to creatinine clearance  Continue to keep MEP around 60 to 65 mmHg      Please note that portions of this note were completed with a voice recognition program efforts were made to add at the dictations but words may be mistranscribed.    Jaime Rubio MD  8/4/2019  12:41 PM

## 2019-08-04 NOTE — NURSING NOTE
[facility]OC nurse contacted Sabrina GARCIA who reports Dolphin bed has been ordered for pt due to moisture, high skin risk. WOC consulted for perineal wound with previous I&D. [facility]OC nurse will f/u at later time. Please contact [facility] nurse as needed for concerns.

## 2019-08-04 NOTE — PROGRESS NOTES
Kendrick Crystal     LOS: 5 days     Subjective   Patient has had a bump in his creatinine to 3.01.  Having difficulty with urinary retention.  Given fluid bolus and Walker catheter passed.He has passed 4 -500 mL of thin yellow urine.  Patient also has a little bit of air through his penis.  His white blood cell count has come down to 12.3.  Hemoglobin 11.2.      Objective     Vital Signs  Vitals:    08/04/19 0806   BP: 161/94   Pulse: 78   Resp:    Temp:    SpO2:        I/O  Intake & Output (last day)       08/03 0701 - 08/04 0700 08/04 0701 - 08/05 0700    P.O. 840     I.V. (mL/kg)      Total Intake(mL/kg) 840 (7.4)     Urine (mL/kg/hr) 0 (0)     Total Output 0     Net +840                 Physical Exam:  Abdomen soft and nontender.         Results Review:       WBC WBC   Date Value Ref Range Status   08/04/2019 12.34 (H) 3.40 - 10.80 10*3/mm3 Final   08/03/2019 16.57 (H) 3.40 - 10.80 10*3/mm3 Final   08/02/2019 12.17 (H) 3.40 - 10.80 10*3/mm3 Final      HGB Hemoglobin   Date Value Ref Range Status   08/04/2019 11.2 (L) 13.0 - 17.7 g/dL Final   08/03/2019 11.1 (L) 13.0 - 17.7 g/dL Final   08/02/2019 11.5 (L) 13.0 - 17.7 g/dL Final      HCT Hematocrit   Date Value Ref Range Status   08/04/2019 36.5 (L) 37.5 - 51.0 % Final   08/03/2019 34.9 (L) 37.5 - 51.0 % Final   08/02/2019 36.0 (L) 37.5 - 51.0 % Final      PLT        Results from last 7 days   Lab Units 08/04/19  0855   PLATELETS 10*3/mm3 216            Sodium Sodium   Date Value Ref Range Status   08/04/2019 133 (L) 136 - 145 mmol/L Final   08/03/2019 136 136 - 145 mmol/L Final   08/02/2019 133 (L) 136 - 145 mmol/L Final      Potassium Potassium   Date Value Ref Range Status   08/04/2019 4.9 3.5 - 5.2 mmol/L Final   08/03/2019 4.9 3.5 - 5.2 mmol/L Final   08/02/2019 4.8 3.5 - 5.2 mmol/L Final      Chloride Chloride   Date Value Ref Range Status   08/04/2019 99 98 - 107 mmol/L Final   08/03/2019 101 98 - 107 mmol/L Final   08/02/2019 99 98 - 107 mmol/L Final       Bicarbonate No results found for: PLASMABICARB   BUN BUN   Date Value Ref Range Status   08/04/2019 28 (H) 6 - 20 mg/dL Final   08/03/2019 18 6 - 20 mg/dL Final   08/02/2019 14 6 - 20 mg/dL Final      Creatinine Creatinine   Date Value Ref Range Status   08/04/2019 3.01 (H) 0.76 - 1.27 mg/dL Final   08/03/2019 1.52 (H) 0.76 - 1.27 mg/dL Final   08/02/2019 0.74 (L) 0.76 - 1.27 mg/dL Final      Calcium Calcium   Date Value Ref Range Status   08/04/2019 7.9 (L) 8.6 - 10.5 mg/dL Final   08/03/2019 7.9 (L) 8.6 - 10.5 mg/dL Final   08/02/2019 8.3 (L) 8.6 - 10.5 mg/dL Final      Magnesium No results found for: MG         Imaging Results (last 24 hours)     ** No results found for the last 24 hours. **          Assessment/Plan       Sepsis (CMS/[facility])    DUNLAP Cirrhosis    Essential hypertension    Non-compliance    Hyperlipemia    Type 2 diabetes mellitus with circulatory disorder and uncontrolled    History of MRSA infection    JUAN (acute kidney injury) (CMS/[facility])    S/P BKA (below knee amputation), left (CMS/[facility])    Peripheral vascular disease (CMS/[facility])    DM (diabetes mellitus) type II uncontrolled, periph vascular disorder (CMS/[facility])    Abscess    UTI (urinary tract infection)    Hyponatremia  Patient has developed acute renal failure.  This may have several issues that may have led to this including his diabetes, CT scan with contrast, vancomycin, and urinary retention.  He has passed a small amount of air through his penis.  This may indicate that the abscess was periurethral in origin.  His white blood cell count is diminishing.  Agree with Walker catheter.  Patient is to be seen by nephrology today.  May need a return visit by urology if he continues with hematuria.        Mumtaz Payne MD  08/04/19  11:36 AM

## 2019-08-04 NOTE — PROGRESS NOTES
[facility]  PROGRESS NOTE    Patient Name: Kendrick Crystal  : 1970  MRN: [id number]    Date of Admission: 2019  Length of Stay: 5  Primary Care Physician: Keyanna Leone APRN    Subjective   Subjective     CC:  Abscess     HPI:  Minimal UOP overnight. Bladder scanning ongoing. Cr doubled this AM, will place childs. Patient reports he feels ok, no fevers/chills.     Review of Systems      Otherwise ROS is negative except as mentioned in the HPI.    Objective   Objective     Vital Signs:   Temp:  [97.7 °F (36.5 °C)-98.8 °F (37.1 °C)] 98.4 °F (36.9 °C)  Heart Rate:  [74-81] 78  Resp:  [18] 18  BP: ()/() 161/94        Physical Exam:  GEN: NAD, resting in bed, awake, obese, resting in bed, eating breakfast   HEENT: on room air, atraumatic, normocephalic  NECK: supple, no masses  RESP: on room air, normal effort  CV: on tele, sinus rhythm  PSYCH: normal affect, appropriate  NEURO: awake, alert, no focal deficits noted  MSK: no edema noted, left BKA  SKIN: no rashes noted       Results Reviewed:  I have personally reviewed current lab, radiology, and data and agree.    Results from last 7 days   Lab Units 19  0855 19  0829 19  0728  19  0506 19  1631   WBC 10*3/mm3 12.34* 16.57* 12.17*   < > 11.23* 12.13*   HEMOGLOBIN g/dL 11.2* 11.1* 11.5*   < > 12.4* 13.7   HEMATOCRIT % 36.5* 34.9* 36.0*   < > 38.1 42.9   PLATELETS 10*3/mm3 216 230 223   < > 235 290   INR   --   --   --   --  1.02  --    PROCALCITONIN ng/mL  --   --   --   --   --  0.11    < > = values in this interval not displayed.     Results from last 7 days   Lab Units 19  0855 19  0829 19  0727 19  0656   SODIUM mmol/L 133* 136 133* 129*   POTASSIUM mmol/L 4.9 4.9 4.8 4.7   CHLORIDE mmol/L 99 101 99 96*   CO2 mmol/L 20.0* 24.0 25.0 21.0*   BUN mg/dL 28* 18 14 23*   CREATININE mg/dL 3.01* 1.52* 0.74* 0.79   GLUCOSE mg/dL 268* 247* 418* 564*    CALCIUM mg/dL 7.9* 7.9* 8.3* 8.1*   ALT (SGPT) U/L 26  --  24 21   AST (SGOT) U/L 23  --  21 22     Estimated Creatinine Clearance: 39.8 mL/min (A) (by C-G formula based on SCr of 3.01 mg/dL (H)).    Microbiology Results Abnormal     Procedure Component Value - Date/Time    Blood Culture - Blood, Hand, Left [[id number]] Collected:  07/30/19 1845    Lab Status:  Preliminary result Specimen:  Blood from Hand, Left Updated:  08/03/19 2145     Blood Culture No growth at 4 days    Blood Culture - Blood, Arm, Right [[id number]] Collected:  07/30/19 1840    Lab Status:  Preliminary result Specimen:  Blood from Arm, Right Updated:  08/03/19 2145     Blood Culture No growth at 4 days    Wound Culture - Wound, Rectal Abscess [[id number]] Collected:  08/02/19 1620    Lab Status:  Preliminary result Specimen:  Wound from Rectal Abscess Updated:  08/03/19 1118     Wound Culture Culture in progress     Gram Stain Few (2+) WBCs seen      Occasional Yeast      Rare (1+) Gram negative bacilli    Urine Culture - Urine, Urine, Clean Catch [[id number]]  (Normal) Collected:  07/30/19 1725    Lab Status:  Final result Specimen:  Urine, Clean Catch Updated:  07/31/19 2019     Urine Culture No growth          Imaging Results (last 24 hours)     ** No results found for the last 24 hours. **          Results for orders placed during the hospital encounter of 05/18/19   Adult Transthoracic Echo Complete W/ Cont if Necessary Per Protocol    Narrative · Estimated EF = 60%.  · Mild mitral valve regurgitation is present  · Mild tricuspid valve regurgitation is present.  · Calculated right ventricular systolic pressure from tricuspid   regurgitation is 29 mmHg.          I have reviewed the medications:  Scheduled Meds:    [START ON 8/5/2019] DAPTOmycin 4 mg/kg (Adjusted) Intravenous Q48H   DULoxetine 30 mg Oral Daily   heparin (porcine) 5,000 Units Subcutaneous Q8H   insulin detemir 30 Units Subcutaneous Q12H   insulin lispro 0-9 Units Subcutaneous  "4x Daily With Meals & Nightly   insulin lispro 8 Units Subcutaneous TID With Meals   metoprolol tartrate 75 mg Oral Q12H   micafungin (MYCAMINE)  mg Intravenous Q24H   piperacillin-tazobactam 2.25 g Intravenous Q8H   saccharomyces boulardii 250 mg Oral BID   tamsulosin 0.8 mg Oral Nightly     Continuous Infusions:    sodium chloride 125 mL/hr Last Rate: 125 mL/hr (08/04/19 0202)     PRN Meds:.dextrose  •  dextrose  •  glucagon (human recombinant)  •  HYDROcodone-acetaminophen  •  HYDROmorphone  •  ondansetron **OR** ondansetron  •  sodium chloride      Assessment/Plan   Assessment / Plan     Active Hospital Problems    Diagnosis  POA   • **Sepsis (CMS/HCC) [A41.9]  Yes   • Abscess [L02.91]  Yes   • UTI (urinary tract infection) [N39.0]  Yes   • Hyponatremia [E87.1]  Yes   • DM (diabetes mellitus) type II uncontrolled, periph vascular disorder (CMS/HCC) [E11.51, E11.65]  Yes   • Peripheral vascular disease (CMS/HCC) [I73.9]  Yes   • S/P BKA (below knee amputation), left (CMS/[facility]) [Z89.512]  Not Applicable   • History of MRSA infection [Z86.14]  Yes   • Type 2 diabetes mellitus with circulatory disorder and uncontrolled [E11.59]  Yes   • Essential hypertension [I10]  Yes   • Hyperlipemia [E78.5]  Yes   • DUNLAP Cirrhosis [K74.60]  Yes   • Non-compliance [Z91.14]  Not Applicable      Resolved Hospital Problems   No resolved problems to display.        Brief Hospital Course to date:  Kendrick Crystal is a 48 year old gentleman with uncontrolled diabetes who presents with sepsis secondary to abscess inferior to the prostate and anterior to the rectum.          Sepsis, claudia-rectal abscess, UTI   -pt meets sepsis criteria based on elevated lactic acid, leukocytosis, tachycardia and positive source  -CT abd/pel obtained: \"3.0 x 3.2 cm fluid collection with enhancing margins consistent with an abscess related to the inferior aspect of the prostate gland and anterior to the distal rectum.\"  -ID following for assistance with " "abx  -abscess drainage done 8/2, cultures pending   -continue IVF; NS @ 100ml/hour    JUAN, concern for obstructive uropathy   -Cr doubled again today, minimal UOP with continued need for bladder scanning  -NAL consult placed, d/w Dr Garcia, place childs, stop all nephrotoxic medications      Hyponatremia  -likely 2/2 to hyperglycemia, improving   -continue IVF   -trend; repeat BMP in am     Uncontrolled T2DM  -pt reports that he often \"forgets\" to take his routine medications; including his insulin and does not check his glucose daily  -blood sugar >600 on admission, but did not meet criteria for DKA, now improving with subQ insulin   -current regimen -levemir 30 BID and 8 units lispro with meals , SSI  -hem a1c 14 this admission  -diabetes educator consult      HTN  -labile, monitor   -have stopped lisinopril given JUAN- monitor closely, may need additional medications for control         DVT prophylaxis:  scd- right leg , heparin given JUAN           Disposition: I expect the patient to be discharged pending     CODE STATUS:   Code Status and Medical Interventions:   Ordered at: 07/30/19 2051     Code Status:    CPR     Medical Interventions (Level of Support Prior to Arrest):    Full         Electronically signed by Brenda Gonzales MD, 08/04/19, 10:55 AM.      "

## 2019-08-05 ENCOUNTER — APPOINTMENT (OUTPATIENT)
Dept: ULTRASOUND IMAGING | Facility: HOSPITAL | Age: 49
End: 2019-08-05

## 2019-08-05 LAB
ANION GAP SERPL CALCULATED.3IONS-SCNC: 13 MMOL/L (ref 5–15)
BASOPHILS # BLD AUTO: 0.04 10*3/MM3 (ref 0–0.2)
BASOPHILS NFR BLD AUTO: 0.3 % (ref 0–1.5)
BUN BLD-MCNC: 39 MG/DL (ref 6–20)
BUN/CREAT SERPL: 10.7 (ref 7–25)
CALCIUM SPEC-SCNC: 7.9 MG/DL (ref 8.6–10.5)
CHLORIDE SERPL-SCNC: 102 MMOL/L (ref 98–107)
CO2 SERPL-SCNC: 17 MMOL/L (ref 22–29)
CREAT BLD-MCNC: 3.64 MG/DL (ref 0.76–1.27)
DEPRECATED RDW RBC AUTO: 49.1 FL (ref 37–54)
EOSINOPHIL # BLD AUTO: 0.37 10*3/MM3 (ref 0–0.4)
EOSINOPHIL NFR BLD AUTO: 2.8 % (ref 0.3–6.2)
ERYTHROCYTE [DISTWIDTH] IN BLOOD BY AUTOMATED COUNT: 15.8 % (ref 12.3–15.4)
GFR SERPL CREATININE-BSD FRML MDRD: 18 ML/MIN/1.73
GLUCOSE BLD-MCNC: 190 MG/DL (ref 65–99)
GLUCOSE BLDC GLUCOMTR-MCNC: 110 MG/DL (ref 70–130)
GLUCOSE BLDC GLUCOMTR-MCNC: 142 MG/DL (ref 70–130)
GLUCOSE BLDC GLUCOMTR-MCNC: 209 MG/DL (ref 70–130)
GLUCOSE BLDC GLUCOMTR-MCNC: 216 MG/DL (ref 70–130)
HCT VFR BLD AUTO: 35.1 % (ref 37.5–51)
HGB BLD-MCNC: 11.1 G/DL (ref 13–17.7)
IMM GRANULOCYTES # BLD AUTO: 0.11 10*3/MM3 (ref 0–0.05)
IMM GRANULOCYTES NFR BLD AUTO: 0.8 % (ref 0–0.5)
LYMPHOCYTES # BLD AUTO: 1.35 10*3/MM3 (ref 0.7–3.1)
LYMPHOCYTES NFR BLD AUTO: 10.1 % (ref 19.6–45.3)
MCH RBC QN AUTO: 27.1 PG (ref 26.6–33)
MCHC RBC AUTO-ENTMCNC: 31.6 G/DL (ref 31.5–35.7)
MCV RBC AUTO: 85.8 FL (ref 79–97)
MONOCYTES # BLD AUTO: 1.21 10*3/MM3 (ref 0.1–0.9)
MONOCYTES NFR BLD AUTO: 9 % (ref 5–12)
NEUTROPHILS # BLD AUTO: 10.33 10*3/MM3 (ref 1.7–7)
NEUTROPHILS NFR BLD AUTO: 77 % (ref 42.7–76)
NRBC BLD AUTO-RTO: 0 /100 WBC (ref 0–0.2)
PLATELET # BLD AUTO: 217 10*3/MM3 (ref 140–450)
PMV BLD AUTO: 12.2 FL (ref 6–12)
POTASSIUM BLD-SCNC: 5.7 MMOL/L (ref 3.5–5.2)
RBC # BLD AUTO: 4.09 10*6/MM3 (ref 4.14–5.8)
SODIUM BLD-SCNC: 132 MMOL/L (ref 136–145)
WBC NRBC COR # BLD: 13.41 10*3/MM3 (ref 3.4–10.8)

## 2019-08-05 PROCEDURE — 63710000001 INSULIN DETEMIR PER 5 UNITS: Performed by: INTERNAL MEDICINE

## 2019-08-05 PROCEDURE — 25010000002 HYDRALAZINE PER 20 MG: Performed by: PHYSICIAN ASSISTANT

## 2019-08-05 PROCEDURE — 76775 US EXAM ABDO BACK WALL LIM: CPT

## 2019-08-05 PROCEDURE — 25010000002 PIPERACILLIN-TAZOBACTAM: Performed by: INTERNAL MEDICINE

## 2019-08-05 PROCEDURE — 25010000003 MICAFUNGIN SODIUM 100 MG RECONSTITUTED SOLUTION: Performed by: INTERNAL MEDICINE

## 2019-08-05 PROCEDURE — 25010000002 ONDANSETRON PER 1 MG: Performed by: NURSE PRACTITIONER

## 2019-08-05 PROCEDURE — 99233 SBSQ HOSP IP/OBS HIGH 50: CPT | Performed by: INTERNAL MEDICINE

## 2019-08-05 PROCEDURE — 25010000002 HEPARIN (PORCINE) PER 1000 UNITS: Performed by: INTERNAL MEDICINE

## 2019-08-05 PROCEDURE — 80048 BASIC METABOLIC PNL TOTAL CA: CPT | Performed by: INTERNAL MEDICINE

## 2019-08-05 PROCEDURE — 82962 GLUCOSE BLOOD TEST: CPT

## 2019-08-05 PROCEDURE — 85025 COMPLETE CBC W/AUTO DIFF WBC: CPT | Performed by: INTERNAL MEDICINE

## 2019-08-05 PROCEDURE — 25010000002 DAPTOMYCIN PER 1 MG: Performed by: INTERNAL MEDICINE

## 2019-08-05 RX ORDER — HYDRALAZINE HYDROCHLORIDE 20 MG/ML
10 INJECTION INTRAMUSCULAR; INTRAVENOUS ONCE
Status: COMPLETED | OUTPATIENT
Start: 2019-08-05 | End: 2019-08-05

## 2019-08-05 RX ADMIN — PIPERACILLIN AND TAZOBACTAM 2.25 G: 2; .25 INJECTION, POWDER, LYOPHILIZED, FOR SOLUTION INTRAVENOUS; PARENTERAL at 09:32

## 2019-08-05 RX ADMIN — METOPROLOL TARTRATE 75 MG: 50 TABLET ORAL at 09:32

## 2019-08-05 RX ADMIN — INSULIN DETEMIR 32 UNITS: 100 INJECTION, SOLUTION SUBCUTANEOUS at 21:03

## 2019-08-05 RX ADMIN — METOPROLOL TARTRATE 75 MG: 50 TABLET ORAL at 21:02

## 2019-08-05 RX ADMIN — SODIUM CHLORIDE 125 ML/HR: 9 INJECTION, SOLUTION INTRAVENOUS at 08:18

## 2019-08-05 RX ADMIN — SODIUM CHLORIDE 125 ML/HR: 9 INJECTION, SOLUTION INTRAVENOUS at 02:25

## 2019-08-05 RX ADMIN — INSULIN DETEMIR 30 UNITS: 100 INJECTION, SOLUTION SUBCUTANEOUS at 09:31

## 2019-08-05 RX ADMIN — HEPARIN SODIUM 5000 UNITS: 5000 INJECTION INTRAVENOUS; SUBCUTANEOUS at 21:03

## 2019-08-05 RX ADMIN — HYDROCODONE BITARTRATE AND ACETAMINOPHEN 1 TABLET: 10; 325 TABLET ORAL at 21:03

## 2019-08-05 RX ADMIN — HYDRALAZINE HYDROCHLORIDE 10 MG: 20 INJECTION INTRAMUSCULAR; INTRAVENOUS at 04:18

## 2019-08-05 RX ADMIN — INSULIN LISPRO 4 UNITS: 100 INJECTION, SOLUTION INTRAVENOUS; SUBCUTANEOUS at 09:32

## 2019-08-05 RX ADMIN — DULOXETINE HYDROCHLORIDE 30 MG: 30 CAPSULE, DELAYED RELEASE ORAL at 09:32

## 2019-08-05 RX ADMIN — MICAFUNGIN SODIUM 100 MG: 20 INJECTION, POWDER, LYOPHILIZED, FOR SOLUTION INTRAVENOUS at 11:30

## 2019-08-05 RX ADMIN — HEPARIN SODIUM 5000 UNITS: 5000 INJECTION INTRAVENOUS; SUBCUTANEOUS at 05:08

## 2019-08-05 RX ADMIN — PATIROMER 2 PACKET: 8.4 POWDER, FOR SUSPENSION ORAL at 11:29

## 2019-08-05 RX ADMIN — DAPTOMYCIN 400 MG: 500 INJECTION, POWDER, LYOPHILIZED, FOR SOLUTION INTRAVENOUS at 13:34

## 2019-08-05 RX ADMIN — PIPERACILLIN AND TAZOBACTAM 2.25 G: 2; .25 INJECTION, POWDER, LYOPHILIZED, FOR SOLUTION INTRAVENOUS; PARENTERAL at 17:56

## 2019-08-05 RX ADMIN — HEPARIN SODIUM 5000 UNITS: 5000 INJECTION INTRAVENOUS; SUBCUTANEOUS at 14:18

## 2019-08-05 RX ADMIN — Medication 250 MG: at 09:32

## 2019-08-05 RX ADMIN — TAMSULOSIN HYDROCHLORIDE 0.8 MG: 0.4 CAPSULE ORAL at 21:02

## 2019-08-05 RX ADMIN — INSULIN LISPRO 8 UNITS: 100 INJECTION, SOLUTION INTRAVENOUS; SUBCUTANEOUS at 12:20

## 2019-08-05 RX ADMIN — Medication 250 MG: at 21:02

## 2019-08-05 RX ADMIN — INSULIN LISPRO 8 UNITS: 100 INJECTION, SOLUTION INTRAVENOUS; SUBCUTANEOUS at 09:33

## 2019-08-05 RX ADMIN — ONDANSETRON 4 MG: 2 INJECTION INTRAMUSCULAR; INTRAVENOUS at 06:54

## 2019-08-05 RX ADMIN — PIPERACILLIN AND TAZOBACTAM 2.25 G: 2; .25 INJECTION, POWDER, LYOPHILIZED, FOR SOLUTION INTRAVENOUS; PARENTERAL at 02:25

## 2019-08-05 RX ADMIN — INSULIN LISPRO 4 UNITS: 100 INJECTION, SOLUTION INTRAVENOUS; SUBCUTANEOUS at 12:21

## 2019-08-05 RX ADMIN — SODIUM CHLORIDE 75 ML/HR: 9 INJECTION, SOLUTION INTRAVENOUS at 18:39

## 2019-08-05 NOTE — PROGRESS NOTES
Kendrick Crystal     LOS: 6 days     Subjective   Patient developed some nausea this morning.  Remains afebrile.  Has been seen by nephrology and given diagnosis of acute kidney injury.  His creatinine is up to 3.64.  Urine output 1075.  Potassium 5.7.  White blood cell count 13.4 with a hemoglobin of 11.1.      Objective     Vital Signs  Vitals:    08/05/19 0349   BP: (!) 189/101   Pulse: 71   Resp: 16   Temp: 97.6 °F (36.4 °C)   SpO2: 98%       I/O  Intake & Output (last day)       08/04 0701 - 08/05 0700 08/05 0701 - 08/06 0700    P.O.      Total Intake(mL/kg)      Urine (mL/kg/hr) 1075 (0.4)     Total Output 1075     Net -1075                 Physical Exam:  Abdomen soft and nontender.  Patient has some tenderness in the anterior perineum but no redness or induration.       Results Review:       WBC WBC   Date Value Ref Range Status   08/05/2019 13.41 (H) 3.40 - 10.80 10*3/mm3 Final   08/04/2019 12.34 (H) 3.40 - 10.80 10*3/mm3 Final   08/03/2019 16.57 (H) 3.40 - 10.80 10*3/mm3 Final   08/02/2019 12.17 (H) 3.40 - 10.80 10*3/mm3 Final      HGB Hemoglobin   Date Value Ref Range Status   08/05/2019 11.1 (L) 13.0 - 17.7 g/dL Final   08/04/2019 11.2 (L) 13.0 - 17.7 g/dL Final   08/03/2019 11.1 (L) 13.0 - 17.7 g/dL Final   08/02/2019 11.5 (L) 13.0 - 17.7 g/dL Final      HCT Hematocrit   Date Value Ref Range Status   08/05/2019 35.1 (L) 37.5 - 51.0 % Final   08/04/2019 36.5 (L) 37.5 - 51.0 % Final   08/03/2019 34.9 (L) 37.5 - 51.0 % Final   08/02/2019 36.0 (L) 37.5 - 51.0 % Final      PLT        Results from last 7 days   Lab Units 08/05/19  0451   PLATELETS 10*3/mm3 217            Sodium Sodium   Date Value Ref Range Status   08/05/2019 132 (L) 136 - 145 mmol/L Final   08/04/2019 133 (L) 136 - 145 mmol/L Final   08/03/2019 136 136 - 145 mmol/L Final   08/02/2019 133 (L) 136 - 145 mmol/L Final      Potassium Potassium   Date Value Ref Range Status   08/05/2019 5.7 (H) 3.5 - 5.2 mmol/L Final   08/04/2019 4.9 3.5 - 5.2  mmol/L Final   08/03/2019 4.9 3.5 - 5.2 mmol/L Final   08/02/2019 4.8 3.5 - 5.2 mmol/L Final      Chloride Chloride   Date Value Ref Range Status   08/05/2019 102 98 - 107 mmol/L Final   08/04/2019 99 98 - 107 mmol/L Final   08/03/2019 101 98 - 107 mmol/L Final   08/02/2019 99 98 - 107 mmol/L Final      Bicarbonate No results found for: PLASMABICARB   BUN BUN   Date Value Ref Range Status   08/05/2019 39 (H) 6 - 20 mg/dL Final   08/04/2019 28 (H) 6 - 20 mg/dL Final   08/03/2019 18 6 - 20 mg/dL Final   08/02/2019 14 6 - 20 mg/dL Final      Creatinine Creatinine   Date Value Ref Range Status   08/05/2019 3.64 (H) 0.76 - 1.27 mg/dL Final   08/04/2019 3.01 (H) 0.76 - 1.27 mg/dL Final   08/03/2019 1.52 (H) 0.76 - 1.27 mg/dL Final   08/02/2019 0.74 (L) 0.76 - 1.27 mg/dL Final      Calcium Calcium   Date Value Ref Range Status   08/05/2019 7.9 (L) 8.6 - 10.5 mg/dL Final   08/04/2019 7.9 (L) 8.6 - 10.5 mg/dL Final   08/03/2019 7.9 (L) 8.6 - 10.5 mg/dL Final   08/02/2019 8.3 (L) 8.6 - 10.5 mg/dL Final      Magnesium No results found for: MG         Imaging Results (last 24 hours)     ** No results found for the last 24 hours. **          Assessment/Plan       Sepsis (CMS/[facility])    DUNLAP Cirrhosis    Essential hypertension    Non-compliance    Hyperlipemia    Type 2 diabetes mellitus with circulatory disorder and uncontrolled    History of MRSA infection    JUAN (acute kidney injury) (CMS/[facility])    S/P BKA (below knee amputation), left (CMS/[facility])    Peripheral vascular disease (CMS/[facility])    DM (diabetes mellitus) type II uncontrolled, periph vascular disorder (CMS/[facility])    Abscess    UTI (urinary tract infection)    Hyponatremia      Perirectal abscess now status post drainage.  Still with mildly elevated white blood cell count.  Acute kidney injury.  Hyperkalemia.  Poorly controlled diabetes mellitus.    If white blood cell count continues to increase may wish to repeat CT scan.  Continue IV antibiotics.  Appreciate nephrology's  input regarding renal issues.    Mumtaz Payne MD  08/05/19  7:01 AM

## 2019-08-05 NOTE — PROGRESS NOTES
Continued Stay Note  [facility]     Patient Name: Kendrick Crystal  MRN: [id number]  Today's Date: 8/5/2019    Admit Date: 7/30/2019    Discharge Plan     Row Name 08/05/19 0917       Plan    Plan  Home w/     Patient/Family in Agreement with Plan  yes    Plan Comments  Spoke w/ wife at bedside. Plan is still home w/ [facility]. Will need orders. Denies any needs at this time. CM will continue to follow.    Final Discharge Disposition Code  06 - home with home health care        Discharge Codes    No documentation.       Expected Discharge Date and Time     Expected Discharge Date Expected Discharge Time    Aug 5, 2019             Fadi Valentine RN

## 2019-08-05 NOTE — PROGRESS NOTES
"NAL Progress Note  Kendrick Crystal  [id number]  1970 7/30/2019    Reason for visit:  JUAN    Sleepy today     ROS:    Pulm:  No SOA  CV:  No CP    I&O:    Intake/Output Summary (Last 24 hours) at 8/5/2019 1333  Last data filed at 8/5/2019 0400  Gross per 24 hour   Intake --   Output 650 ml   Net -650 ml       PE:   Blood pressure 158/87, pulse 79, temperature 97.2 °F (36.2 °C), temperature source Axillary, resp. rate 18, height 190.5 cm (75\"), weight 113 kg (250 lb), SpO2 95 %.    GENERAL: Awake and alert, in no acute distress.   HEENT: PER, No conjunctivitis  NECK: Supple, no JVD     HEART: RRR; No murmur, rubs, gallops.   LUNGS: Clear to auscultation bilaterally without wheezing, rales, rhonchi. Normal respiratory effort. Nonlabored. No dullness.  ABDOMEN: Soft, nontender, nondistended. Positive bowel sounds. No rebound or guarding. NO mass or HSM.  EXT:  No cyanosis, clubbing, + edema. No cord.  : Genitalia generally unremarkable.  Without Walker catheter.  SKIN: Warm and dry without cutaneous eruptions on Inspection/palpation.    NEURO: Alert and oriented x 3, Motor 5/5 bilaterally    Medications:    Current Facility-Administered Medications:   •  DAPTOmycin (CUBICIN) 400 mg in sodium chloride 0.9 % 50 mL IVPB, 4 mg/kg (Adjusted), Intravenous, Q48H, Usman Dong MD  •  dextrose (D50W) 25 g/ 50mL Intravenous Solution 25 g, 25 g, Intravenous, Q15 Min PRN, Keyanna Aponte APRN  •  dextrose (GLUTOSE) oral gel 15 g, 15 g, Oral, Q15 Min PRN, Keyanna Aponte APRN  •  DULoxetine (CYMBALTA) DR capsule 30 mg, 30 mg, Oral, Daily, Keyanna Aponte APRN, 30 mg at 08/05/19 0932  •  glucagon (human recombinant) (GLUCAGEN DIAGNOSTIC) injection 1 mg, 1 mg, Subcutaneous, PRN, Keyanna Aponte APRN  •  heparin (porcine) 5000 UNIT/ML injection 5,000 Units, 5,000 Units, Subcutaneous, Q8H, Brenda Gonzales MD, 5,000 Units at 08/05/19 0508  •  HYDROcodone-acetaminophen (NORCO)  MG per tablet 1 tablet, 1 tablet, Oral, " Q6H PRN, Keyanna Aponte APRN, 1 tablet at 08/04/19 1015  •  HYDROmorphone (DILAUDID) injection 1 mg, 1 mg, Intravenous, Q3H PRN, Keyanna Aponte APRN  •  insulin detemir (LEVEMIR) injection 30 Units, 30 Units, Subcutaneous, Q12H, Brenda Gonzales MD, 30 Units at 08/05/19 0931  •  insulin lispro (humaLOG) injection 0-9 Units, 0-9 Units, Subcutaneous, 4x Daily With Meals & Nightly, Keyanna Aponte APRN, 4 Units at 08/05/19 1221  •  insulin lispro (humaLOG) injection 8 Units, 8 Units, Subcutaneous, TID With Meals, Brenda Gonzales MD, 8 Units at 08/05/19 1220  •  metoprolol tartrate (LOPRESSOR) tablet 75 mg, 75 mg, Oral, Q12H, Keyanna Aponte APRN, 75 mg at 08/05/19 0932  •  micafungin 100 mg/100 mL 0.9% NS IVPB (mbp), 100 mg, Intravenous, Q24H, Usman Dong MD, 100 mg at 08/05/19 1130  •  ondansetron (ZOFRAN) tablet 4 mg, 4 mg, Oral, Q6H PRN **OR** ondansetron (ZOFRAN) injection 4 mg, 4 mg, Intravenous, Q6H PRN, Keyanna Aponte APRN, 4 mg at 08/05/19 0654  •  piperacillin-tazobactam (ZOSYN) 2.25 g/100 mL 0.9% NS IVPB (mbp), 2.25 g, Intravenous, Q8H, Usman Dong MD, 2.25 g at 08/05/19 0932  •  saccharomyces boulardii (FLORASTOR) capsule 250 mg, 250 mg, Oral, BID, Keyanna Aponte APRN, 250 mg at 08/05/19 0932  •  sodium chloride 0.9 % flush 10 mL, 10 mL, Intravenous, PRN, Sotelo, Kendrick Mary MD, 10 mL at 08/04/19 0807  •  sodium chloride 0.9 % infusion, 125 mL/hr, Intravenous, Continuous, Brenda Gonzales MD, Last Rate: 125 mL/hr at 08/05/19 0818, 125 mL/hr at 08/05/19 0818  •  tamsulosin (FLOMAX) 24 hr capsule 0.8 mg, 0.8 mg, Oral, Nightly, Keyanna Aponte APRN, 0.8 mg at 08/04/19 2301    Meds reviewed and doses adjusted for renal function    Labs:  Results from last 7 days   Lab Units 08/05/19  0451 08/04/19  0855 08/03/19  0829 08/02/19  0728 08/02/19  0727 08/01/19  0656 07/31/19  0506 07/30/19  1631   WBC 10*3/mm3 13.41* 12.34* 16.57* 12.17*  --  11.27* 11.23* 12.13*   HEMOGLOBIN g/dL 11.1* 11.2*  11.1* 11.5*  --  11.9* 12.4* 13.7   HEMATOCRIT % 35.1* 36.5* 34.9* 36.0*  --  37.6 38.1 42.9   PLATELETS 10*3/mm3 217 216 230 223  --  236 235 290   SODIUM mmol/L 132* 133* 136  --  133* 129* 131* 126*   POTASSIUM mmol/L 5.7* 4.9 4.9  --  4.8 4.7 4.5 5.0   CHLORIDE mmol/L 102 99 101  --  99 96* 94* 88*   CO2 mmol/L 17.0* 20.0* 24.0  --  25.0 21.0* 24.0 26.0   BUN mg/dL 39* 28* 18  --  14 23* 19 19   CREATININE mg/dL 3.64* 3.01* 1.52*  --  0.74* 0.79 0.82 0.79   GLUCOSE mg/dL 190* 268* 247*  --  418* 564* 510* 687*   CALCIUM mg/dL 7.9* 7.9* 7.9*  --  8.3* 8.1* 8.3* 9.3   URIC ACID mg/dL  --  5.0  --   --   --   --   --   --              Radiology:  Imaging Results (last 72 hours)     ** No results found for the last 72 hours. **          Renal Imaging:  Reviewed  No radiology results for the last day        IMPRESSION:  Acute kidney injury-likely related to ATN there is a high likelihood that this is related to vancomycin toxicity history of vancomycin level was quite high but this is also a multifactorial issue as his blood pressure was relatively low in the last 48 hours in addition he was taking lisinopril  Worsening renal function  Chronic kidney disease he does have recently started having protein in his urine  Hypertension  Poorly controlled diabetes  Peripheral vascular disease  Hyperkalemia    RECOMMENDATIONS:  Agree with stopping lisinopril  Labs in am  Continue to avoid any nephrotoxic agents and adjust medication according to creatinine clearance  Continue to keep MEP around 60 to 65 mmHg  Lower ivf rate to 75 cc/hr  valtassa for high k     Please note that portions of this note were completed with a voice recognition program efforts were made to add at the dictations but words may be mistranscribed.    Jaime Rubio MD  8/5/2019  1:33 PM

## 2019-08-05 NOTE — PROGRESS NOTES
[facility] Medicine Services  PROGRESS NOTE    Patient Name: Kendrick Crystal  : 1970  MRN: [id number]    Date of Admission: 2019  Length of Stay: 6  Primary Care Physician: Keyanna Leone APRN    Subjective   Subjective     CC:  Abscess     HPI:  Mother present.  No new issues. Childs in place, no hematuria.  Denies much pain.    Review of Systems  Gen- No fevers, chills  CV- No chest pain, palpitations  Resp- No cough, dyspnea  GI- No N/V/D, abd pain    Objective   Objective     Vital Signs:   Temp:  [97.2 °F (36.2 °C)-98.4 °F (36.9 °C)] 97.4 °F (36.3 °C)  Heart Rate:  [68-81] 68  Resp:  [16-18] 18  BP: (148-189)/() 158/87        Physical Exam:  GEN: NAD, resting in bed, awake, obese, resting in bed, watching tv  HEENT: on room air, atraumatic, normocephalic  NECK: supple, no masses  RESP: on room air, normal effort  CV: RRR, no murmurs  PSYCH: normal affect, appropriate  NEURO: awake, alert, no focal deficits noted  MSK: no edema noted, left BKA  SKIN: no rashes noted   : childs in place with dilute yellow urine      Results Reviewed:  I have personally reviewed current lab, radiology, and data and agree.    Results from last 7 days   Lab Units 19  0451 19  0855 19  0829  19  0506 19  1631   WBC 10*3/mm3 13.41* 12.34* 16.57*   < > 11.23* 12.13*   HEMOGLOBIN g/dL 11.1* 11.2* 11.1*   < > 12.4* 13.7   HEMATOCRIT % 35.1* 36.5* 34.9*   < > 38.1 42.9   PLATELETS 10*3/mm3 217 216 230   < > 235 290   INR   --   --   --   --  1.02  --    PROCALCITONIN ng/mL  --   --   --   --   --  0.11    < > = values in this interval not displayed.     Results from last 7 days   Lab Units 19  0451 19  0855 19  0829 19  0727 19  0656   SODIUM mmol/L 132* 133* 136 133* 129*   POTASSIUM mmol/L 5.7* 4.9 4.9 4.8 4.7   CHLORIDE mmol/L 102 99 101 99 96*   CO2 mmol/L 17.0* 20.0* 24.0 25.0 21.0*   BUN mg/dL 39* 28* 18 14 23*   CREATININE  mg/dL 3.64* 3.01* 1.52* 0.74* 0.79   GLUCOSE mg/dL 190* 268* 247* 418* 564*   CALCIUM mg/dL 7.9* 7.9* 7.9* 8.3* 8.1*   ALT (SGPT) U/L  --  26  --  24 21   AST (SGOT) U/L  --  23  --  21 22     Estimated Creatinine Clearance: 32.9 mL/min (A) (by C-G formula based on SCr of 3.64 mg/dL (H)).    Microbiology Results Abnormal     Procedure Component Value - Date/Time    Anaerobic Culture - Wound, Rectal Abscess [[id number]] Collected:  08/02/19 1620    Lab Status:  Preliminary result Specimen:  Wound from Rectal Abscess Updated:  08/05/19 1220     Anaerobic Culture No anaerobes isolated at 3 days    Wound Culture - Wound, Rectal Abscess [[id number]]  (Abnormal) Collected:  08/02/19 1620    Lab Status:  Preliminary result Specimen:  Wound from Rectal Abscess Updated:  08/05/19 0959     Wound Culture Scant growth (1+) Escherichia coli      Scant growth (1+) Gram Negative Bacilli      Scant growth (1+) Normal Fecal Haily     Gram Stain Few (2+) WBCs seen      Occasional Yeast      Rare (1+) Gram negative bacilli    Narrative:       Specimen is an abscess - work up all GNRs per Dr. Dong ([facility] infection control).    Blood Culture - Blood, Hand, Left [[id number]] Collected:  07/30/19 1845    Lab Status:  Final result Specimen:  Blood from Hand, Left Updated:  08/04/19 2145     Blood Culture No growth at 5 days    Blood Culture - Blood, Arm, Right [[id number]] Collected:  07/30/19 1840    Lab Status:  Final result Specimen:  Blood from Arm, Right Updated:  08/04/19 2145     Blood Culture No growth at 5 days    Urine Culture - Urine, Urine, Clean Catch [[id number]]  (Normal) Collected:  07/30/19 1725    Lab Status:  Final result Specimen:  Urine, Clean Catch Updated:  07/31/19 2019     Urine Culture No growth          Imaging Results (last 24 hours)     ** No results found for the last 24 hours. **          Results for orders placed during the hospital encounter of 05/18/19   Adult Transthoracic Echo Complete W/ Cont if Necessary  Per Protocol    Narrative · Estimated EF = 60%.  · Mild mitral valve regurgitation is present  · Mild tricuspid valve regurgitation is present.  · Calculated right ventricular systolic pressure from tricuspid   regurgitation is 29 mmHg.          I have reviewed the medications:  Scheduled Meds:    DAPTOmycin 4 mg/kg (Adjusted) Intravenous Q48H   DULoxetine 30 mg Oral Daily   heparin (porcine) 5,000 Units Subcutaneous Q8H   insulin detemir 32 Units Subcutaneous Q12H   insulin lispro 0-9 Units Subcutaneous 4x Daily With Meals & Nightly   insulin lispro 9 Units Subcutaneous TID With Meals   metoprolol tartrate 75 mg Oral Q12H   micafungin (MYCAMINE)  mg Intravenous Q24H   piperacillin-tazobactam 2.25 g Intravenous Q8H   saccharomyces boulardii 250 mg Oral BID   tamsulosin 0.8 mg Oral Nightly     Continuous Infusions:    sodium chloride 75 mL/hr Last Rate: 75 mL/hr (08/05/19 1438)     PRN Meds:.dextrose  •  dextrose  •  glucagon (human recombinant)  •  HYDROcodone-acetaminophen  •  HYDROmorphone  •  ondansetron **OR** ondansetron  •  sodium chloride      Assessment/Plan   Assessment / Plan     Active Hospital Problems    Diagnosis  POA   • **Sepsis (CMS/Former[facility]) [A41.9]  Yes   • Abscess [L02.91]  Yes   • UTI (urinary tract infection) [N39.0]  Yes   • Hyponatremia [E87.1]  Yes   • DM (diabetes mellitus) type II uncontrolled, periph vascular disorder (CMS/[facility]) [E11.51, E11.65]  Yes   • Peripheral vascular disease (CMS/[facility]ly [facility]) [I73.9]  Yes   • S/P BKA (below knee amputation), left (CMS/[facility]) [Z89.512]  Not Applicable   • JUAN (acute kidney injury) (CMS/[facility]ly [facility]) [N17.9]  Unknown   • History of MRSA infection [Z86.14]  Yes   • Type 2 diabetes mellitus with circulatory disorder and uncontrolled [E11.59]  Yes   • Essential hypertension [I10]  Yes   • Hyperlipemia [E78.5]  Yes   • DUNLAP Cirrhosis [K74.60]  Yes   • Non-compliance [Z91.14]  Not Applicable      Resolved Hospital Problems   No resolved problems to display.        Brief  "Hospital Course to date:  Kendrick Crystal is a 48 year old gentleman with uncontrolled diabetes who presents with sepsis secondary to abscess inferior to the prostate and anterior to the rectum.    Sepsis, claudia-rectal abscess, UTI   -pt met sepsis criteria based on elevated lactic acid, leukocytosis, tachycardia and positive source  -CT abd/pel obtained: \"3.0 x 3.2 cm fluid collection with enhancing margins consistent with an abscess related to the inferior aspect of the prostate gland and anterior to the distal rectum.\"  -ID following for assistance with abx, d/w Dr. Dong this morning  -abscess drainage done 8/2, cultures with GNR and e coli      JUAN  Metabolic acidosis  Hyperkalemia  -Cr continues to rise, up to 3.6 today  - renal following.  Multifactorial.  Walker in place with good UOP.  - valtessa for K+, check AM       Hyponatremia  - improved, stable     Uncontrolled T2DM, a1c>14%  -pt reports that he often \"forgets\" to take his routine medications; including his insulin and does not check his glucose daily  -blood sugar >600 on admission, but did not meet criteria for DKA, now improving with subQ insulin   - will increase basal and prandial insuln for better control     HTN  -labile, monitor   -have stopped lisinopril given JUAN- monitor closely, may need additional medications for control         DVT prophylaxis:  scd- right leg , heparin given JUAN     Disposition: I expect the patient to be discharged home 3-4 days.    CODE STATUS:   Code Status and Medical Interventions:   Ordered at: 07/30/19 2051     Code Status:    CPR     Medical Interventions (Level of Support Prior to Arrest):    Full         Electronically signed by Shahbaz Valdivia MD, 08/05/19, 4:28 PM.      "

## 2019-08-05 NOTE — PROGRESS NOTES
[facility] Progress Note        Antibiotics:  Anti-Infectives (From admission, onward)    Ordered     Dose/Rate Route Frequency Start Stop    19 1000  DAPTOmycin (CUBICIN) 400 mg in sodium chloride 0.9 % 50 mL IVPB     Ordering Provider:  Usman Dong MD    4 mg/kg × 95.9 kg (Adjusted)  100 mL/hr over 30 Minutes Intravenous Every 48 Hours 19 1300 19 1259    19 1002  piperacillin-tazobactam (ZOSYN) 2.25 g/100 mL 0.9% NS IVPB (mbp)     Comments:  First dose given in ED.   Ordering Provider:  Usman Dong MD    2.25 g  over 4 Hours Intravenous Every 8 Hours 19 1752 19 1759    19 0955  micafungin 100 mg/100 mL 0.9% NS IVPB (mbp)     Ordering Provider:  Usman Dong MD    100 mg  over 60 Minutes Intravenous Every 24 Hours 19 1100 08/10/19 1059    19 2051  [MAR Hold]  piperacillin-tazobactam (ZOSYN) 3.375 g in iso-osmotic dextrose 50 ml (premix)     (MAR Hold since 19 1447)   Comments:  First dose given in ED.   Ordering Provider:  Keyanna Aponte APRN    3.375 g  over 4 Hours Intravenous Every 8 Hours 19 0100 19 0059    19 1820  vancomycin 2250 mg/500 mL 0.9% NS IVPB ([facility])     Ordering Provider:  Kendrick Sotelo MD    20 mg/kg × 113 kg  over 3 Hours Intravenous Once 19 1822 19 2155    19 1820  piperacillin-tazobactam (ZOSYN) 3.375 g in iso-osmotic dextrose 50 ml (premix)     Ordering Provider:  Kendrick Sotelo MD    3.375 g  over 30 Minutes Intravenous Once 19 1822 19 1924          CC: rectal pain    HPI:    Patient is a 48 y.o.  Yr old male with history of poorly contolled T2DM, DUNLAP/liver cirrhosis, HTN, PVD/Left BKA, and multiple skin abscesses/MRSA who presented to [facility] ED with right shin wound infection summer 2018.  He was unable to get to see Dr. Martinez as his father passed away unexpectedly on 18 and he had to wait until after the .  The wound worsened becoming gangrenous  and he came to [facility] ED in August 2018.  He also had been hospitalized at [facility] and then transferred to  for a severe penis/scrotum infection requiring surgical debridement in summer 2018.  He received antibiotics regarding his right leg infection, generally improved over the remainder of summer 2018 but that area had not completely healed. He was admitted April 24, 2019 with acute left lower abdominal wall redness/swelling and pain, spontaneous drainage associated with fever/chills and uncontrolled blood sugars.  4/26 I&D requiring wide debridement , severe/deep infection and transferred to [facility] on May 3 with IV Zosyn/oral doxycycline. Cultures had lactobacillus and mixed gram-positive skin sheryl including alpha strep, staph epidermidis and corynebacterium. Readmitted to [facility] May 18, 2019, increasing generalized edema ultimately transitioned to oral doxycycline and healed.     He presented to the emergency room July 30, 2019 with acute rectal pain that had begun 7/27; this is associated with constipation for days, chills and nausea/dry heaving.  White blood cell count elevated, high hemoglobin A1c over 13, urinalysis with hematuria/pyuria and CT scan with 3 x 3 cm fluid collection anterior to the distal rectum and per discussion with Dr. Akbar/Jennifer, this is not in the prostate.  Colorectal surgery/urology saw him for consideration of surgical options/timing/threshold, etc..     8/2/19 I&Dper Dr Payne perirectal abscess; patient reports that he had instrumented his rectum prior to hospitalization with enema    8/3/19 urinary retention overnight requiring in/out catheterization per patient.  Creat climb    8/4/19 further creat climb; childs placed and lisinopril stopped and d/w Dr Rubio    8/5/19  He reports fatigue and some perirectal pain but not progressive , currently dull, nonradiating, better with pain meds, 3 out of 10 at present.  He denies hematochezia  "melena or hematemesis.  No diarrhea.  Recent Constipation as above.  No dysuria hematuria or pyuria.  No hesitancy urgency or flank pain at present.      No headache photophobia or neck stiffness.  No shortness of breath cough or hemoptysis.  No skin rash.       ROS:      8/5/19 No f/c/s. No n/v/d. No rash. No new ADR to Abx.       Constitutional--appetite diminished with malaise.  No sweats   Heent-- No new vision, hearing or throat complaints.  No epistaxis or oral sores.  Denies odynophagia or dysphagia.  No flashers, floaters or eye pain. No odynophagia or dysphagia. No headache, photophobia or neck stiffness.  CV-- No chest pain, palpitation or syncope  Resp-- No SOB/cough/Hemoptysis  GI- No  hematochezia, melena, or hematemesis. Denies jaundice ; he has chronic liver disease  --as above.  No dysuria, hematuria, or flank pain.  Denies hesitancy, urgency or flank pain.  Lymph- no swollen lymph nodes in neck/axilla or groin.   Heme- No active bruising or bleeding; no Hx of DVT or PE.  MS-- no swelling or pain in the bones or joints of arms/legs.  No new back pain.  Neuro-- No acute focal weakness or numbness in the arms or legs.  No seizures.     Full 12 point review of systems reviewed and negative otherwise for acute complaints, except for above          PE:   /87 (BP Location: Left arm, Patient Position: Lying)   Pulse 79   Temp 97.5 °F (36.4 °C) (Oral)   Resp 16   Ht 190.5 cm (75\")   Wt 113 kg (250 lb)   SpO2 95%   BMI 31.25 kg/m²       GENERAL: sleepy, in no acute distress.   HEENT: Normocephalic, atraumatic.  PERRL. EOMI. No conjunctival injection. No icterus. Oropharynx clear without evidence of thrush or exudate. No evidence of peridontal disease.    NECK: Supple without nuchal rigidity. No mass.  LYMPH: No cervical, axillary or inguinal lymphadenopathy.  HEART: RRR; No murmur, rubs, gallops.   LUNGS: Clear to auscultation bilaterally without wheezing, rales, rhonchi. Normal respiratory " effort. Nonlabored. No dullness.  ABDOMEN: Soft, nontender, nondistended. Positive bowel sounds. No rebound or guarding. NO mass or HSM.  EXT:  No cyanosis, clubbing or edema. No cord.  : Genitalia generally unremarkable.  Unable to elicit any perineal tenderness.  No discrete mass bulge or fluctuance.  No crepitus or bulla.  I am unable to elicit any perianal tenderness with no obvious bulge or fluctuance there either.  Slight candidiasis  MSK: FROM without joint effusions noted arms/legs.    SKIN: Warm and dry without cutaneous eruptions on Inspection/palpation.    NEURO: sleepy     No peripheral stigmata/phenomena of endocarditis           Laboratory Data    Results from last 7 days   Lab Units 08/05/19  0451 08/04/19  0855 08/03/19  0829   WBC 10*3/mm3 13.41* 12.34* 16.57*   HEMOGLOBIN g/dL 11.1* 11.2* 11.1*   HEMATOCRIT % 35.1* 36.5* 34.9*   PLATELETS 10*3/mm3 217 216 230     Results from last 7 days   Lab Units 08/05/19  0451   SODIUM mmol/L 132*   POTASSIUM mmol/L 5.7*   CHLORIDE mmol/L 102   CO2 mmol/L 17.0*   BUN mg/dL 39*   CREATININE mg/dL 3.64*   GLUCOSE mg/dL 190*   CALCIUM mg/dL 7.9*     Results from last 7 days   Lab Units 08/04/19  0855   ALK PHOS U/L 207*   BILIRUBIN mg/dL 0.3   ALT (SGPT) U/L 26   AST (SGOT) U/L 23               Estimated Creatinine Clearance: 32.9 mL/min (A) (by C-G formula based on SCr of 3.64 mg/dL (H)).      Microbiology:      Radiology:  Imaging Results (last 72 hours)     Procedure Component Value Units Date/Time    CT Abdomen Pelvis With Contrast [[id number]] Collected:  07/30/19 1749     Updated:  07/31/19 1034    Narrative:       EXAMINATION: CT ABDOMEN PELVIS W CONTRAST- 07/30/2019      INDICATION: lower abd pain     TECHNIQUE: CT scan of the abdomen and pelvis was performed with  intravenous contrast.     The radiation dose reduction device was turned on for each scan per the  ALARA (As Low as Reasonably Achievable) protocol.     COMPARISON: 05/30/2019     FINDINGS: The  most superior images demonstrate no basilar inflammatory  inflammatory process or pleural effusion. Small effusions have resolved  since previous examination. The liver and spleen are normal. There are  small bilateral fat-containing adrenal nodules consistent bilateral  myolipomas, unchanged. The pancreas is normal. There is no renal mass,  stone or obstruction. There is no ascites,  aneurysm or retroperitoneal  lymphadenopathy. There is diffuse thickening of the bladder wall,  probably related to the peroneal abscess. There is no inguinal  lymphadenopathy. There are sigmoid diverticula without features of  diverticulitis. The appendix is normal.     Just inferior to the prostate gland and anterior to the distal rectum  there is a 3.0 x 3.2 cm fluid collection with enhancing margins  consistent with an abscess.       Impression:       There is a 3.0 x 3.2 cm fluid collection with enhancing  margins consistent with an abscess related to the inferior aspect of the  prostate gland and anterior to the distal rectum.     D:  07/30/2019  E:  07/31/2019     This report was finalized on 7/31/2019 10:31 AM by Dr. Guillermo Akbar MD.               Impression:     --Acute sepsis per internal medicine admission note, concern for abscess in the perirectal tissue as etiology.  Empiric antibiotics ongoing with risk for mixed infection.  drainage 8/2; culture data pending.  Broad-spectrum antimicrobials ongoing.     --Acute perirectal abscess associated with constipation as above.  Colorectal surgery, Dr. Payne following to help guide timing/options/threshold for further drainage if needed.  Drainage as above on August 2      --History urinary retention.    Recurrent retention overnight August 2-August 3.  Medicine following to help guide further work-up, urology input, etc.    --ARF 8/3-8/5;  ?obstruction associated with retention (1200 out with I/O cath per nursing overnight) -v- contrast from CT -v- lisinopril/medication/vancomycin  -v- relative hypotension -v- likely combination of factors; nephrology following; vancomycin trough high and vancomycin stopped empirically 8/3 although high trough potentially accumulation as a consequence of diminishing renal function associated with retention/contrast rather than cause.  Nonetheless, avoid for now    --Cutaneous candidiasis.  Mycamine added     --History MRSA     --Diabetes mellitus type 2, uncontrolled.  He reports the reason for this is forgetfulness     --History Johnston and chronic liver disease per past notes     --Left BKA     --Peripheral vascular disease    --hyperkalemia per medicine/nephrology        PLAN:    --IV Zosyn and Mycamine and daptomycin     --Check/review labs cultures and scans     --Discussed with microbiology     --History per nursing staff     --Discussed with Dr. Valdivia/leydi/Ramiro     --Discussed with family, partial history per them     --Highly complex set of issues with high risk for further serious morbidity and other serious sequela    --nephrology following            Usman Dong MD  8/5/2019

## 2019-08-05 NOTE — PLAN OF CARE
Problem: Patient Care Overview  Goal: Plan of Care Review  Outcome: Ongoing (interventions implemented as appropriate)   08/05/19 3277   Coping/Psychosocial   Plan of Care Reviewed With spouse;patient;other (see comments)  (JOSE Medel)   OTHER   Outcome Summary WOC nurse consult for high risk patient. Coccyx and right heel CDI and blanchable. Patient on [place], however needs extender as he is 190.5 cm. Extender ordered from [facility] [phone]. Per wife abdominal wound has healed. Please reconsult if needed. Thank you

## 2019-08-05 NOTE — PLAN OF CARE
Problem: Patient Care Overview  Goal: Plan of Care Review  Outcome: Ongoing (interventions implemented as appropriate)   08/05/19 1743   Coping/Psychosocial   Plan of Care Reviewed With patient;family   Plan of Care Review   Progress no change   OTHER   Outcome Summary blood pressure elevated, scheduled meds given. pt slept majority of day. renal ultrasound done due to increasing creatinine. pt had no complaints, family at bedside.        Problem: Skin Injury Risk (Adult)  Goal: Identify Related Risk Factors and Signs and Symptoms  Outcome: Ongoing (interventions implemented as appropriate)   08/05/19 1743   Skin Injury Risk (Adult)   Related Risk Factors (Skin Injury Risk) body weight extremes;edema;infection;mobility impaired;moisture;nutritional deficiencies;tissue perfusion altered       Problem: Fall Risk (Adult)  Goal: Identify Related Risk Factors and Signs and Symptoms  Outcome: Ongoing (interventions implemented as appropriate)   08/05/19 1743   Fall Risk (Adult)   Related Risk Factors (Fall Risk) depression/anxiety;gait/mobility problems;sleep pattern alteration;environment unfamiliar   Signs and Symptoms (Fall Risk) presence of risk factors       Problem: Sepsis/Septic Shock (Adult)  Goal: Signs and Symptoms of Listed Potential Problems Will be Absent, Minimized or Managed (Sepsis/Septic Shock)  Outcome: Ongoing (interventions implemented as appropriate)   08/05/19 1743   Goal/Outcome Evaluation   Problems Assessed (Sepsis) glycemic control impaired;infection progression;situational response   Problems Present (Sepsis) glycemic control impaired;infection progression;situational response       Problem: Diabetes, Type 2 (Adult)  Goal: Signs and Symptoms of Listed Potential Problems Will be Absent, Minimized or Managed (Diabetes, Type 2)  Outcome: Ongoing (interventions implemented as appropriate)   08/05/19 1743   Goal/Outcome Evaluation   Problems Assessed (Type 2 Diabetes) hyperglycemia;situational  response   Problems Present (Type 2 Diabetes) hyperglycemia;situational response

## 2019-08-06 LAB
ALBUMIN SERPL-MCNC: 2.4 G/DL (ref 3.5–5.2)
ALBUMIN/GLOB SERPL: 0.6 G/DL
ALP SERPL-CCNC: 201 U/L (ref 39–117)
ALT SERPL W P-5'-P-CCNC: 20 U/L (ref 1–41)
ANION GAP SERPL CALCULATED.3IONS-SCNC: 17 MMOL/L (ref 5–15)
ANION GAP SERPL CALCULATED.3IONS-SCNC: 18 MMOL/L (ref 5–15)
AST SERPL-CCNC: 17 U/L (ref 1–40)
BACTERIA SPEC AEROBE CULT: ABNORMAL
BILIRUB SERPL-MCNC: 0.3 MG/DL (ref 0.2–1.2)
BUN BLD-MCNC: 47 MG/DL (ref 6–20)
BUN BLD-MCNC: 48 MG/DL (ref 6–20)
BUN/CREAT SERPL: 10.3 (ref 7–25)
BUN/CREAT SERPL: 10.3 (ref 7–25)
CALCIUM SPEC-SCNC: 7.5 MG/DL (ref 8.6–10.5)
CALCIUM SPEC-SCNC: 8 MG/DL (ref 8.6–10.5)
CHLORIDE SERPL-SCNC: 103 MMOL/L (ref 98–107)
CHLORIDE SERPL-SCNC: 106 MMOL/L (ref 98–107)
CO2 SERPL-SCNC: 13 MMOL/L (ref 22–29)
CO2 SERPL-SCNC: 16 MMOL/L (ref 22–29)
CREAT BLD-MCNC: 4.57 MG/DL (ref 0.76–1.27)
CREAT BLD-MCNC: 4.68 MG/DL (ref 0.76–1.27)
DEPRECATED RDW RBC AUTO: 52 FL (ref 37–54)
ERYTHROCYTE [DISTWIDTH] IN BLOOD BY AUTOMATED COUNT: 16 % (ref 12.3–15.4)
GFR SERPL CREATININE-BSD FRML MDRD: 13 ML/MIN/1.73
GFR SERPL CREATININE-BSD FRML MDRD: 14 ML/MIN/1.73
GFR SERPL CREATININE-BSD FRML MDRD: ABNORMAL ML/MIN/1.73
GFR SERPL CREATININE-BSD FRML MDRD: ABNORMAL ML/MIN/1.73
GLOBULIN UR ELPH-MCNC: 3.7 GM/DL
GLUCOSE BLD-MCNC: 158 MG/DL (ref 65–99)
GLUCOSE BLD-MCNC: 178 MG/DL (ref 65–99)
GLUCOSE BLDC GLUCOMTR-MCNC: 109 MG/DL (ref 70–130)
GLUCOSE BLDC GLUCOMTR-MCNC: 125 MG/DL (ref 70–130)
GLUCOSE BLDC GLUCOMTR-MCNC: 155 MG/DL (ref 70–130)
GLUCOSE BLDC GLUCOMTR-MCNC: 89 MG/DL (ref 70–130)
GRAM STN SPEC: ABNORMAL
HCT VFR BLD AUTO: 38.1 % (ref 37.5–51)
HGB BLD-MCNC: 11.6 G/DL (ref 13–17.7)
MCH RBC QN AUTO: 26.9 PG (ref 26.6–33)
MCHC RBC AUTO-ENTMCNC: 30.4 G/DL (ref 31.5–35.7)
MCV RBC AUTO: 88.4 FL (ref 79–97)
PLATELET # BLD AUTO: 232 10*3/MM3 (ref 140–450)
PMV BLD AUTO: 11.8 FL (ref 6–12)
POTASSIUM BLD-SCNC: 5.4 MMOL/L (ref 3.5–5.2)
POTASSIUM BLD-SCNC: 5.6 MMOL/L (ref 3.5–5.2)
PROT SERPL-MCNC: 6.1 G/DL (ref 6–8.5)
RBC # BLD AUTO: 4.31 10*6/MM3 (ref 4.14–5.8)
SODIUM BLD-SCNC: 136 MMOL/L (ref 136–145)
SODIUM BLD-SCNC: 137 MMOL/L (ref 136–145)
WBC NRBC COR # BLD: 14.44 10*3/MM3 (ref 3.4–10.8)

## 2019-08-06 PROCEDURE — 63710000001 INSULIN DETEMIR PER 5 UNITS: Performed by: INTERNAL MEDICINE

## 2019-08-06 PROCEDURE — 25010000002 CEFTRIAXONE PER 250 MG: Performed by: INTERNAL MEDICINE

## 2019-08-06 PROCEDURE — 99233 SBSQ HOSP IP/OBS HIGH 50: CPT | Performed by: INTERNAL MEDICINE

## 2019-08-06 PROCEDURE — 25010000002 HEPARIN (PORCINE) PER 1000 UNITS: Performed by: INTERNAL MEDICINE

## 2019-08-06 PROCEDURE — 80053 COMPREHEN METABOLIC PANEL: CPT | Performed by: INTERNAL MEDICINE

## 2019-08-06 PROCEDURE — 85027 COMPLETE CBC AUTOMATED: CPT | Performed by: INTERNAL MEDICINE

## 2019-08-06 PROCEDURE — 25010000002 PIPERACILLIN-TAZOBACTAM: Performed by: INTERNAL MEDICINE

## 2019-08-06 PROCEDURE — 82962 GLUCOSE BLOOD TEST: CPT

## 2019-08-06 RX ORDER — AMLODIPINE BESYLATE 5 MG/1
5 TABLET ORAL
Status: DISCONTINUED | OUTPATIENT
Start: 2019-08-06 | End: 2019-08-07

## 2019-08-06 RX ADMIN — HEPARIN SODIUM 5000 UNITS: 5000 INJECTION INTRAVENOUS; SUBCUTANEOUS at 13:43

## 2019-08-06 RX ADMIN — DULOXETINE HYDROCHLORIDE 30 MG: 30 CAPSULE, DELAYED RELEASE ORAL at 08:42

## 2019-08-06 RX ADMIN — HEPARIN SODIUM 5000 UNITS: 5000 INJECTION INTRAVENOUS; SUBCUTANEOUS at 21:28

## 2019-08-06 RX ADMIN — INSULIN LISPRO 2 UNITS: 100 INJECTION, SOLUTION INTRAVENOUS; SUBCUTANEOUS at 08:47

## 2019-08-06 RX ADMIN — INSULIN DETEMIR 32 UNITS: 100 INJECTION, SOLUTION SUBCUTANEOUS at 21:29

## 2019-08-06 RX ADMIN — METOPROLOL TARTRATE 75 MG: 50 TABLET ORAL at 19:28

## 2019-08-06 RX ADMIN — SODIUM CHLORIDE 75 ML/HR: 9 INJECTION, SOLUTION INTRAVENOUS at 08:43

## 2019-08-06 RX ADMIN — PIPERACILLIN AND TAZOBACTAM 2.25 G: 2; .25 INJECTION, POWDER, LYOPHILIZED, FOR SOLUTION INTRAVENOUS; PARENTERAL at 02:32

## 2019-08-06 RX ADMIN — AMLODIPINE BESYLATE 5 MG: 5 TABLET ORAL at 15:31

## 2019-08-06 RX ADMIN — PIPERACILLIN AND TAZOBACTAM 2.25 G: 2; .25 INJECTION, POWDER, LYOPHILIZED, FOR SOLUTION INTRAVENOUS; PARENTERAL at 11:38

## 2019-08-06 RX ADMIN — METOPROLOL TARTRATE 75 MG: 50 TABLET ORAL at 08:42

## 2019-08-06 RX ADMIN — HYDROCODONE BITARTRATE AND ACETAMINOPHEN 1 TABLET: 10; 325 TABLET ORAL at 21:29

## 2019-08-06 RX ADMIN — HEPARIN SODIUM 5000 UNITS: 5000 INJECTION INTRAVENOUS; SUBCUTANEOUS at 05:08

## 2019-08-06 RX ADMIN — METRONIDAZOLE 500 MG: 500 INJECTION, SOLUTION INTRAVENOUS at 17:22

## 2019-08-06 RX ADMIN — INSULIN DETEMIR 32 UNITS: 100 INJECTION, SOLUTION SUBCUTANEOUS at 08:48

## 2019-08-06 RX ADMIN — CEFTRIAXONE SODIUM 1 G: 1 INJECTION, POWDER, FOR SOLUTION INTRAMUSCULAR; INTRAVENOUS at 16:19

## 2019-08-06 RX ADMIN — Medication 250 MG: at 08:42

## 2019-08-06 RX ADMIN — Medication 250 MG: at 21:29

## 2019-08-06 RX ADMIN — MICAFUNGIN SODIUM 100 MG: 20 INJECTION, POWDER, LYOPHILIZED, FOR SOLUTION INTRAVENOUS at 10:34

## 2019-08-06 RX ADMIN — TAMSULOSIN HYDROCHLORIDE 0.8 MG: 0.4 CAPSULE ORAL at 19:28

## 2019-08-06 NOTE — PROGRESS NOTES
[facility]  PROGRESS NOTE    Patient Name: Kendrick Crystal  : 1970  MRN: [id number]    Date of Admission: 2019  Length of Stay: 7  Primary Care Physician: Keyanna Leone APRN    Subjective   Subjective     CC:  Abscess     HPI:  Mother present.  No new complaints today.  Has a 100oz container of water her had been drinking.    Review of Systems  Gen- No fevers, chills  CV- No chest pain, palpitations  Resp- No cough, dyspnea  GI- No N/V/D, abd pain    Objective   Objective     Vital Signs:   Temp:  [96.7 °F (35.9 °C)-98.6 °F (37 °C)] 98.6 °F (37 °C)  Heart Rate:  [68-70] 70  Resp:  [18] 18  BP: (154-181)/() 181/103        Physical Exam:  GEN: NAD, resting in bed, awake, obese, resting in bed  HEENT: on room air, atraumatic, normocephalic  NECK: supple, no masses  RESP: on room air, normal effort  CV: RRR, no murmurs  PSYCH: normal affect, appropriate  NEURO: awake, alert, no focal deficits noted  MSK: no edema noted, left BKA  SKIN: no rashes noted   : childs in place with dilute yellow urine  Exam is unchanged from     Results Reviewed:  I have personally reviewed current lab, radiology, and data and agree.    Results from last 7 days   Lab Units 19  0905 19  0451 19  0855  19  0506 19  1631   WBC 10*3/mm3 14.44* 13.41* 12.34*   < > 11.23* 12.13*   HEMOGLOBIN g/dL 11.6* 11.1* 11.2*   < > 12.4* 13.7   HEMATOCRIT % 38.1 35.1* 36.5*   < > 38.1 42.9   PLATELETS 10*3/mm3 232 217 216   < > 235 290   INR   --   --   --   --  1.02  --    PROCALCITONIN ng/mL  --   --   --   --   --  0.11    < > = values in this interval not displayed.     Results from last 7 days   Lab Units 19  1109 19  0905 19  0451 19  0855  19  0727   SODIUM mmol/L 137 136 132* 133*   < > 133*   POTASSIUM mmol/L 5.4* 5.6* 5.7* 4.9   < > 4.8   CHLORIDE mmol/L 103 106 102 99   < > 99   CO2 mmol/L 16.0* 13.0* 17.0* 20.0*   < >  25.0   BUN mg/dL 47* 48* 39* 28*   < > 14   CREATININE mg/dL 4.57* 4.68* 3.64* 3.01*   < > 0.74*   GLUCOSE mg/dL 158* 178* 190* 268*   < > 418*   CALCIUM mg/dL 7.5* 8.0* 7.9* 7.9*   < > 8.3*   ALT (SGPT) U/L  --  20  --  26  --  24   AST (SGOT) U/L  --  17  --  23  --  21    < > = values in this interval not displayed.     Estimated Creatinine Clearance: 26.2 mL/min (A) (by C-G formula based on SCr of 4.57 mg/dL (H)).    Microbiology Results Abnormal     Procedure Component Value - Date/Time    Wound Culture - Wound, Rectal Abscess [[id number]]  (Abnormal)  (Susceptibility) Collected:  08/02/19 1620    Lab Status:  Final result Specimen:  Wound from Rectal Abscess Updated:  08/06/19 0922     Wound Culture Scant growth (1+) Escherichia coli      Scant growth (1+) Klebsiella pneumoniae ssp pneumoniae      Scant growth (1+) Normal Fecal Haily     Gram Stain Few (2+) WBCs seen      Occasional Yeast      Rare (1+) Gram negative bacilli    Narrative:       Specimen is an abscess - work up all GNRs per Dr. Dong ([facility] infection control).    Susceptibility      Escherichia coli     EYAD     Ampicillin Resistant     Ampicillin + Sulbactam Intermediate     Cefazolin Susceptible     Cefepime Susceptible     Ceftazidime Susceptible     Ceftriaxone Susceptible     Gentamicin Susceptible     Levofloxacin Resistant     Piperacillin + Tazobactam Susceptible     Tetracycline Susceptible     Trimethoprim + Sulfamethoxazole Susceptible                Susceptibility      Klebsiella pneumoniae ssp pneumoniae     EYAD     Ampicillin Resistant     Ampicillin + Sulbactam Susceptible     Cefazolin Susceptible     Cefepime Susceptible     Ceftazidime Susceptible     Ceftriaxone Susceptible     Gentamicin Susceptible     Levofloxacin Susceptible     Piperacillin + Tazobactam Susceptible     Tetracycline Susceptible     Trimethoprim + Sulfamethoxazole Susceptible                    Anaerobic Culture - Wound, Rectal Abscess [[id number]] Collected:   08/02/19 1620    Lab Status:  Preliminary result Specimen:  Wound from Rectal Abscess Updated:  08/05/19 1220     Anaerobic Culture No anaerobes isolated at 3 days    Blood Culture - Blood, Hand, Left [[id number]] Collected:  07/30/19 1845    Lab Status:  Final result Specimen:  Blood from Hand, Left Updated:  08/04/19 2145     Blood Culture No growth at 5 days    Blood Culture - Blood, Arm, Right [[id number]] Collected:  07/30/19 1840    Lab Status:  Final result Specimen:  Blood from Arm, Right Updated:  08/04/19 2145     Blood Culture No growth at 5 days    Urine Culture - Urine, Urine, Clean Catch [[id number]]  (Normal) Collected:  07/30/19 1725    Lab Status:  Final result Specimen:  Urine, Clean Catch Updated:  07/31/19 2019     Urine Culture No growth          Imaging Results (last 24 hours)     Procedure Component Value Units Date/Time    US Renal Limited [[id number]] Collected:  08/05/19 1722     Updated:  08/06/19 0846    Narrative:       EXAMINATION: US RENAL LIMITED-     INDICATION: Acute kidney injury.      TECHNIQUE: Ultrasound kidneys and urinary bladder.     COMPARISON: None.     FINDINGS: Right kidney measures 12.3 cm in length without evidence of  hydronephrosis, contour deforming mass or obvious calculi.     Left kidney measures 13.3 cm in length without evidence of  hydronephrosis, contour deforming mass or obvious calculi.     Urinary bladder is decompressed with Walker catheter in situ.       Impression:       No acute sonographic abnormality within the visualized  kidneys. No hydronephrosis. Walker catheter in situ with no gross  abnormality of the decompressed urinary bladder.      D:  08/05/2019  E:  08/06/2019             Results for orders placed during the hospital encounter of 05/18/19   Adult Transthoracic Echo Complete W/ Cont if Necessary Per Protocol    Narrative · Estimated EF = 60%.  · Mild mitral valve regurgitation is present  · Mild tricuspid valve regurgitation is present.  ·  Calculated right ventricular systolic pressure from tricuspid   regurgitation is 29 mmHg.          I have reviewed the medications:  Scheduled Meds:    amLODIPine 5 mg Oral Q24H   ceftriaxone 1 g Intravenous Q24H   DULoxetine 30 mg Oral Daily   heparin (porcine) 5,000 Units Subcutaneous Q8H   insulin detemir 32 Units Subcutaneous Q12H   insulin lispro 0-9 Units Subcutaneous 4x Daily With Meals & Nightly   insulin lispro 9 Units Subcutaneous TID With Meals   metoprolol tartrate 75 mg Oral Q12H   metroNIDAZOLE 500 mg Intravenous Q8H   micafungin (MYCAMINE)  mg Intravenous Q24H   saccharomyces boulardii 250 mg Oral BID   tamsulosin 0.8 mg Oral Nightly     Continuous Infusions:    sodium chloride 75 mL/hr Last Rate: Stopped (08/06/19 1145)     PRN Meds:.dextrose  •  dextrose  •  glucagon (human recombinant)  •  HYDROcodone-acetaminophen  •  HYDROmorphone  •  ondansetron **OR** ondansetron  •  sodium chloride      Assessment/Plan   Assessment / Plan     Active Hospital Problems    Diagnosis  POA   • **Sepsis (CMS/[facility]) [A41.9]  Yes   • Abscess [L02.91]  Yes   • UTI (urinary tract infection) [N39.0]  Yes   • Hyponatremia [E87.1]  Yes   • DM (diabetes mellitus) type II uncontrolled, periph vascular disorder (CMS/[facility]) [E11.51, E11.65]  Yes   • Peripheral vascular disease (CMS/[facility]) [I73.9]  Yes   • S/P BKA (below knee amputation), left (CMS/[facility]) [Z89.512]  Not Applicable   • JUAN (acute kidney injury) (CMS/[facility]) [N17.9]  Unknown   • History of MRSA infection [Z86.14]  Yes   • Type 2 diabetes mellitus with circulatory disorder and uncontrolled [E11.59]  Yes   • Essential hypertension [I10]  Yes   • Hyperlipemia [E78.5]  Yes   • DUNLAP Cirrhosis [K74.60]  Yes   • Non-compliance [Z91.14]  Not Applicable      Resolved Hospital Problems   No resolved problems to display.        Brief Hospital Course to date:  Kendrick Crystal is a 48 year old gentleman with uncontrolled diabetes who presents with sepsis secondary to abscess  "inferior to the prostate and anterior to the rectum.    Sepsis, claudia-rectal abscess, UTI   -pt met sepsis criteria based on elevated lactic acid, leukocytosis, tachycardia and positive source  -CT abd/pel obtained: \"3.0 x 3.2 cm fluid collection with enhancing margins consistent with an abscess related to the inferior aspect of the prostate gland and anterior to the distal rectum.\"  -abscess drainage done 8/2, cultures with e coli and klebsiella.  D/w Dr. Garcia - plans change abx to rocephin and flagyl      JUAN  Metabolic acidosis  Hyperkalemia  -Cr continues to rise, up to 4.7 today  - renal following.  Multifactorial.  Walker in place with good UOP.  - s/p valtessa for K+, check AM  - hopefully will improve, if not may need temporary dialysis.  D/w patient and mother       Hyponatremia  - improved, stable     Uncontrolled T2DM, a1c>14%  -pt reports that he often \"forgets\" to take his routine medications; including his insulin and does not check his glucose daily  -blood sugar >600 on admission, but did not meet criteria for DKA, now improving with subQ insulin   - acceptable glc control with yesterday's adjustment, no changes to day     HTN  -labile, monitor   -have stopped lisinopril given JUAN- monitor closely, norvasc added today      DVT prophylaxis:  scd- right leg , heparin given JUAN     Disposition: I expect the patient to be discharged home 4-5 days.    CODE STATUS:   Code Status and Medical Interventions:   Ordered at: 07/30/19 2051     Code Status:    CPR     Medical Interventions (Level of Support Prior to Arrest):    Full       Electronically signed by Shahbaz Valdivia MD, 08/06/19, 2:56 PM.      "

## 2019-08-06 NOTE — PROGRESS NOTES
[facility] Progress Note        Antibiotics:  Anti-Infectives (From admission, onward)    Ordered     Dose/Rate Route Frequency Start Stop    19 1000  DAPTOmycin (CUBICIN) 400 mg in sodium chloride 0.9 % 50 mL IVPB     Ordering Provider:  Usman Dong MD    4 mg/kg × 95.9 kg (Adjusted)  100 mL/hr over 30 Minutes Intravenous Every 48 Hours 19 1300 19 1259    19 1002  piperacillin-tazobactam (ZOSYN) 2.25 g/100 mL 0.9% NS IVPB (mbp)     Comments:  First dose given in ED.   Ordering Provider:  Usman Dong MD    2.25 g  over 4 Hours Intravenous Every 8 Hours 19 1752 19 1759    19 0955  micafungin 100 mg/100 mL 0.9% NS IVPB (mbp)     Ordering Provider:  Usman Dong MD    100 mg  over 60 Minutes Intravenous Every 24 Hours 19 1100 08/10/19 1059    19 2051  [MAR Hold]  piperacillin-tazobactam (ZOSYN) 3.375 g in iso-osmotic dextrose 50 ml (premix)     (MAR Hold since 19 1447)   Comments:  First dose given in ED.   Ordering Provider:  Keyanna Aponte APRN    3.375 g  over 4 Hours Intravenous Every 8 Hours 19 0100 19 0059    19 1820  vancomycin 2250 mg/500 mL 0.9% NS IVPB ([facility])     Ordering Provider:  Kendrick Sotelo MD    20 mg/kg × 113 kg  over 3 Hours Intravenous Once 19 1822 19 2155    19 1820  piperacillin-tazobactam (ZOSYN) 3.375 g in iso-osmotic dextrose 50 ml (premix)     Ordering Provider:  Kendrick Sotelo MD    3.375 g  over 30 Minutes Intravenous Once 19 1822 19 1924          CC: rectal pain    HPI:    Patient is a 48 y.o.  Yr old male with history of poorly contolled T2DM, DUNLAP/liver cirrhosis, HTN, PVD/Left BKA, and multiple skin abscesses/MRSA who presented to [facility] ED with right shin wound infection summer 2018.  He was unable to get to see Dr. Martinez as his father passed away unexpectedly on 18 and he had to wait until after the .  The wound worsened becoming gangrenous  and he came to [facility] ED in August 2018.  He also had been hospitalized at [facility] and then transferred to  for a severe penis/scrotum infection requiring surgical debridement in summer 2018.  He received antibiotics regarding his right leg infection, generally improved over the remainder of summer 2018 but that area had not completely healed. He was admitted April 24, 2019 with acute left lower abdominal wall redness/swelling and pain, spontaneous drainage associated with fever/chills and uncontrolled blood sugars.  4/26 I&D requiring wide debridement , severe/deep infection and transferred to [facility] on May 3 with IV Zosyn/oral doxycycline. Cultures had lactobacillus and mixed gram-positive skin sherly including alpha strep, staph epidermidis and corynebacterium. Readmitted to [facility] May 18, 2019, increasing generalized edema ultimately transitioned to oral doxycycline and healed.     He presented to the emergency room July 30, 2019 with acute rectal pain that had begun 7/27; this is associated with constipation for days, chills and nausea/dry heaving.  White blood cell count elevated, high hemoglobin A1c over 13, urinalysis with hematuria/pyuria and CT scan with 3 x 3 cm fluid collection anterior to the distal rectum and per discussion with Dr. Akbar/Jennifer, this is not in the prostate.  Colorectal surgery/urology saw him for consideration of surgical options/timing/threshold, etc..     8/2/19 I&Dper Dr Payne perirectal abscess; patient reports that he had instrumented his rectum prior to hospitalization with enema    8/3/19 urinary retention overnight requiring in/out catheterization per patient.  Creat climb    8/4/19 further creat climb; childs placed and lisinopril stopped and d/w Dr Rubio    8/6/19  He is sleepy and He reports fatigue and some perirectal pain but not progressive , currently dull, nonradiating, better with pain meds, 3 out of 10 at present.  He denies  "hematochezia melena or hematemesis.  No diarrhea.  Recent Constipation as above.  No dysuria hematuria or pyuria.  No hesitancy urgency or flank pain at present.      No headache photophobia or neck stiffness.  No shortness of breath cough or hemoptysis.  No skin rash.       ROS:      8/6/19 No f/c/s. No n/v/d. No rash. No new ADR to Abx.       Constitutional--appetite diminished with malaise.  No sweats   Heent-- No new vision, hearing or throat complaints.  No epistaxis or oral sores.  Denies odynophagia or dysphagia.  No flashers, floaters or eye pain. No odynophagia or dysphagia. No headache, photophobia or neck stiffness.  CV-- No chest pain, palpitation or syncope  Resp-- No SOB/cough/Hemoptysis  GI- No  hematochezia, melena, or hematemesis. Denies jaundice ; he has chronic liver disease  --as above.  No dysuria, hematuria, or flank pain.  Denies hesitancy, urgency or flank pain.  Lymph- no swollen lymph nodes in neck/axilla or groin.   Heme- No active bruising or bleeding; no Hx of DVT or PE.  MS-- no swelling or pain in the bones or joints of arms/legs.  No new back pain.  Neuro-- No acute focal weakness or numbness in the arms or legs.  No seizures.     Full 12 point review of systems reviewed and negative otherwise for acute complaints, except for above          PE:   /95 (BP Location: Left arm, Patient Position: Lying)   Pulse 68   Temp 96.7 °F (35.9 °C) (Oral)   Resp 18   Ht 190.5 cm (75\")   Wt 113 kg (250 lb)   SpO2 96%   BMI 31.25 kg/m²       GENERAL: sleepy, in no acute distress.   HEENT: Normocephalic, atraumatic.  PERRL. EOMI. No conjunctival injection. No icterus. Oropharynx clear without evidence of thrush or exudate. No evidence of peridontal disease.    NECK: Supple without nuchal rigidity. No mass.  LYMPH: No cervical, axillary or inguinal lymphadenopathy.  HEART: RRR; No murmur, rubs, gallops.   LUNGS: Clear to auscultation bilaterally without wheezing, rales, rhonchi. Normal " respiratory effort. Nonlabored. No dullness.  ABDOMEN: Soft, nontender, nondistended. Positive bowel sounds. No rebound or guarding. NO mass or HSM.  EXT:  No cyanosis, clubbing or edema. No cord.  : Genitalia generally unremarkable.  Unable to elicit any perineal tenderness.  No discrete mass bulge or fluctuance.  No crepitus or bulla.  I am unable to elicit any perianal tenderness with no obvious bulge or fluctuance there either.  Slight candidiasis  MSK: FROM without joint effusions noted arms/legs.    SKIN: Warm and dry without cutaneous eruptions on Inspection/palpation.    NEURO: sleepy     No peripheral stigmata/phenomena of endocarditis           Laboratory Data    Results from last 7 days   Lab Units 08/05/19  0451 08/04/19  0855 08/03/19  0829   WBC 10*3/mm3 13.41* 12.34* 16.57*   HEMOGLOBIN g/dL 11.1* 11.2* 11.1*   HEMATOCRIT % 35.1* 36.5* 34.9*   PLATELETS 10*3/mm3 217 216 230     Results from last 7 days   Lab Units 08/05/19  0451   SODIUM mmol/L 132*   POTASSIUM mmol/L 5.7*   CHLORIDE mmol/L 102   CO2 mmol/L 17.0*   BUN mg/dL 39*   CREATININE mg/dL 3.64*   GLUCOSE mg/dL 190*   CALCIUM mg/dL 7.9*     Results from last 7 days   Lab Units 08/04/19  0855   ALK PHOS U/L 207*   BILIRUBIN mg/dL 0.3   ALT (SGPT) U/L 26   AST (SGOT) U/L 23               Estimated Creatinine Clearance: 32.9 mL/min (A) (by C-G formula based on SCr of 3.64 mg/dL (H)).      Microbiology:      Radiology:  Imaging Results (last 72 hours)     Procedure Component Value Units Date/Time    CT Abdomen Pelvis With Contrast [[id number]] Collected:  07/30/19 1749     Updated:  07/31/19 1034    Narrative:       EXAMINATION: CT ABDOMEN PELVIS W CONTRAST- 07/30/2019      INDICATION: lower abd pain     TECHNIQUE: CT scan of the abdomen and pelvis was performed with  intravenous contrast.     The radiation dose reduction device was turned on for each scan per the  ALARA (As Low as Reasonably Achievable) protocol.     COMPARISON: 05/30/2019      FINDINGS: The most superior images demonstrate no basilar inflammatory  inflammatory process or pleural effusion. Small effusions have resolved  since previous examination. The liver and spleen are normal. There are  small bilateral fat-containing adrenal nodules consistent bilateral  myolipomas, unchanged. The pancreas is normal. There is no renal mass,  stone or obstruction. There is no ascites,  aneurysm or retroperitoneal  lymphadenopathy. There is diffuse thickening of the bladder wall,  probably related to the peroneal abscess. There is no inguinal  lymphadenopathy. There are sigmoid diverticula without features of  diverticulitis. The appendix is normal.     Just inferior to the prostate gland and anterior to the distal rectum  there is a 3.0 x 3.2 cm fluid collection with enhancing margins  consistent with an abscess.       Impression:       There is a 3.0 x 3.2 cm fluid collection with enhancing  margins consistent with an abscess related to the inferior aspect of the  prostate gland and anterior to the distal rectum.     D:  07/30/2019  E:  07/31/2019     This report was finalized on 7/31/2019 10:31 AM by Dr. Guillerom Akbar MD.               Impression:     --Acute sepsis per internal medicine admission note, concern for abscess in the perirectal tissue as etiology.  Empiric antibiotics ongoing with risk for mixed infection.  drainage 8/2; culture data pending.  Broad-spectrum antimicrobials ongoing.     --Acute perirectal abscess associated with constipation as above.  Colorectal surgery, Dr. Payne following to help guide timing/options/threshold for further drainage if needed.  Drainage as above on August 2      --History urinary retention.    Recurrent retention overnight August 2-August 3.  Medicine following to help guide further work-up, urology input, etc.    --ARF 8/3-8/5;  ?obstruction associated with retention (1200 out with I/O cath per nursing overnight) -v- contrast from CT -v-  lisinopril/medication/vancomycin -v- relative hypotension -v- likely combination of factors; nephrology following; vancomycin trough high and vancomycin stopped empirically 8/3 although high trough potentially accumulation as a consequence of diminishing renal function associated with retention/contrast rather than cause.  Nonetheless, avoid for now    --Cutaneous candidiasis.  Mycamine added     --History MRSA     --Diabetes mellitus type 2, uncontrolled.  He reports the reason for this is forgetfulness     --History Johnston and chronic liver disease per past notes     --Left BKA     --Peripheral vascular disease    --hyperkalemia per medicine/nephrology        PLAN:    --IV Zosyn and Mycamine and daptomycin but likely taper once further culture data     --Check/review labs cultures and scans     --Discussed with microbiology     --History per nursing staff     --Discussed with Dr. Valdivia/Elmer/Ramiro     -Discussed with family, partial history per them     --Highly complex set of issues with high risk for further serious morbidity and other serious sequela    --nephrology following            Usman Dong MD  8/6/2019

## 2019-08-06 NOTE — PROGRESS NOTES
"NAL Progress Note  Kendrick Crystal  [id number]  1970 7/30/2019    Reason for visit:  JUAN    Awake and alert  No shortness of breath  Blood pressure is running high    ROS:    Pulm:  No SOA  CV:  No CP    I&O:    Intake/Output Summary (Last 24 hours) at 8/6/2019 1324  Last data filed at 8/6/2019 0232  Gross per 24 hour   Intake 100 ml   Output 1150 ml   Net -1050 ml       PE:   Blood pressure (!) 181/103, pulse 70, temperature 98.6 °F (37 °C), temperature source Oral, resp. rate 18, height 190.5 cm (75\"), weight 113 kg (250 lb), SpO2 96 %.    GENERAL: Awake and alert, in no acute distress.   HEENT: PER, No conjunctivitis  NECK: Supple, no JVD     HEART: RRR; No murmur, rubs, gallops.   LUNGS: Clear to auscultation bilaterally without wheezing, rales, rhonchi. Normal respiratory effort. Nonlabored. No dullness.  ABDOMEN: Soft, nontender, nondistended. Positive bowel sounds. No rebound or guarding. NO mass or HSM.  EXT:  No cyanosis, clubbing, + edema. No cord.  : Genitalia generally unremarkable.  Without Walker catheter.  SKIN: Warm and dry without cutaneous eruptions on Inspection/palpation.    NEURO: Alert and oriented x 3, Motor 5/5 bilaterally    Medications:    Current Facility-Administered Medications:   •  amLODIPine (NORVASC) tablet 5 mg, 5 mg, Oral, Q24H, Jaime Rubio MD  •  dextrose (D50W) 25 g/ 50mL Intravenous Solution 25 g, 25 g, Intravenous, Q15 Min PRN, Keyanna Aponte APRN  •  dextrose (GLUTOSE) oral gel 15 g, 15 g, Oral, Q15 Min PRN, Keyanna Aponte APRN  •  DULoxetine (CYMBALTA) DR capsule 30 mg, 30 mg, Oral, Daily, Keyanna Aponte APRN, 30 mg at 08/06/19 0842  •  glucagon (human recombinant) (GLUCAGEN DIAGNOSTIC) injection 1 mg, 1 mg, Subcutaneous, PRN, Keyanna Aponte APRN  •  heparin (porcine) 5000 UNIT/ML injection 5,000 Units, 5,000 Units, Subcutaneous, Q8H, Brenda Gonzales MD, 5,000 Units at 08/06/19 0508  •  HYDROcodone-acetaminophen (NORCO)  MG per tablet 1 tablet, 1 " tablet, Oral, Q6H PRN, Keyanna Aponte APRN, 1 tablet at 08/05/19 2103  •  HYDROmorphone (DILAUDID) injection 1 mg, 1 mg, Intravenous, Q3H PRN, Keyanna Aponte APRN  •  insulin detemir (LEVEMIR) injection 32 Units, 32 Units, Subcutaneous, Q12H, Shahbaz Valdivia MD, 32 Units at 08/06/19 0848  •  insulin lispro (humaLOG) injection 0-9 Units, 0-9 Units, Subcutaneous, 4x Daily With Meals & Nightly, Keyanna Aponte APRN, 2 Units at 08/06/19 0847  •  insulin lispro (humaLOG) injection 9 Units, 9 Units, Subcutaneous, TID With Meals, Shahbaz Valdivia MD, 9 Units at 08/06/19 0848  •  metoprolol tartrate (LOPRESSOR) tablet 75 mg, 75 mg, Oral, Q12H, Keyanna Aponte APRN, 75 mg at 08/06/19 0842  •  micafungin 100 mg/100 mL 0.9% NS IVPB (mbp), 100 mg, Intravenous, Q24H, Usman Dong MD, 100 mg at 08/06/19 1034  •  ondansetron (ZOFRAN) tablet 4 mg, 4 mg, Oral, Q6H PRN **OR** ondansetron (ZOFRAN) injection 4 mg, 4 mg, Intravenous, Q6H PRN, Keyanna Aponte APRN, 4 mg at 08/05/19 0654  •  piperacillin-tazobactam (ZOSYN) 2.25 g/100 mL 0.9% NS IVPB (mbp), 2.25 g, Intravenous, Q8H, Usman Dong MD, 2.25 g at 08/06/19 1138  •  saccharomyces boulardii (FLORASTOR) capsule 250 mg, 250 mg, Oral, BID, Keyanna Aponte APRN, 250 mg at 08/06/19 0842  •  sodium chloride 0.9 % flush 10 mL, 10 mL, Intravenous, PRN, Sotelo, Kendrick Mary MD, 10 mL at 08/04/19 0807  •  sodium chloride 0.9 % infusion, 75 mL/hr, Intravenous, Continuous, Jaime Rubio MD, Stopped at 08/06/19 1145  •  tamsulosin (FLOMAX) 24 hr capsule 0.8 mg, 0.8 mg, Oral, Nightly, Keyanna Aponte APRN, 0.8 mg at 08/05/19 2102    Meds reviewed and doses adjusted for renal function    Labs:  Results from last 7 days   Lab Units 08/06/19  1109 08/06/19  0905 08/05/19  0451 08/04/19  0855 08/03/19  0829 08/02/19  0728 08/02/19  0727 08/01/19  0656 07/31/19  0506   WBC 10*3/mm3  --  14.44* 13.41* 12.34* 16.57* 12.17*  --  11.27* 11.23*   HEMOGLOBIN g/dL  --  11.6* 11.1* 11.2*  11.1* 11.5*  --  11.9* 12.4*   HEMATOCRIT %  --  38.1 35.1* 36.5* 34.9* 36.0*  --  37.6 38.1   PLATELETS 10*3/mm3  --  232 217 216 230 223  --  236 235   SODIUM mmol/L 137 136 132* 133* 136  --  133* 129* 131*   POTASSIUM mmol/L 5.4* 5.6* 5.7* 4.9 4.9  --  4.8 4.7 4.5   CHLORIDE mmol/L 103 106 102 99 101  --  99 96* 94*   CO2 mmol/L 16.0* 13.0* 17.0* 20.0* 24.0  --  25.0 21.0* 24.0   BUN mg/dL 47* 48* 39* 28* 18  --  14 23* 19   CREATININE mg/dL 4.57* 4.68* 3.64* 3.01* 1.52*  --  0.74* 0.79 0.82   GLUCOSE mg/dL 158* 178* 190* 268* 247*  --  418* 564* 510*   CALCIUM mg/dL 7.5* 8.0* 7.9* 7.9* 7.9*  --  8.3* 8.1* 8.3*   URIC ACID mg/dL  --   --   --  5.0  --   --   --   --   --              Radiology:  Imaging Results (last 72 hours)     ** No results found for the last 72 hours. **          Renal Imaging:  Reviewed  Us Renal Limited    Result Date: 8/6/2019  No acute sonographic abnormality within the visualized kidneys. No hydronephrosis. Walker catheter in situ with no gross abnormality of the decompressed urinary bladder.   D:  08/05/2019 E:  08/06/2019            IMPRESSION:  Acute kidney injury-likely related to ATN there is a high likelihood that this is related to vancomycin toxicity history of vancomycin level was quite high but this is also a multifactorial issue as his blood pressure was relatively low in the last 48 hours in addition he was taking lisinopril  Worsening renal function  Chronic kidney disease he does have recently started having protein in his urine  Hypertension  Poorly controlled diabetes  Peripheral vascular disease  Hyperkalemia    RECOMMENDATIONS:  Kidney function is getting worse his kidney function are in line with possible vancomycin toxicity and ATN  He still making urine but his potassium is rising too.  Continue avoid high potassium foods keep him on renal diet  Discontinue IV fluids discussed with the nurse  I will add amlodipine for high blood pressure  I am afraid he may end up  needing dialysis until the time he starts improving I have relayed that to him and his family member present in the room and also to staff RN     Please note that portions of this note were completed with a voice recognition program efforts were made to add at the dictations but words may be mistranscribed.    Jaime Rubio MD  8/6/2019  1:24 PM

## 2019-08-06 NOTE — PROGRESS NOTES
Adult Nutrition  Assessment/PES    Patient Name:  Kendrick Crystal  YOB: 1970  MRN: [id number]  Admit Date:  7/30/2019    Assessment Date:  8/6/2019    Reason for Assessment     Row Name 08/06/19 1308          Reason for Assessment    Reason For Assessment  -- 25 mins. LOS.     Diagnosis  -- sepsis, claudia-rectal abscess, UTI, DM, PVD, JUAN/CKD, DUNLAP cirrhosis. H/o L BKA. Complete hx/list of dx per MD notes this adm.           Anthropometrics     Row Name 08/06/19 1309          Anthropometrics    Weight  -- ht=75in, zd=603ed; BMI not applicable, given pt s/p BKA.         Labs/Tests/Procedures/Meds     Row Name 08/06/19 1310          Labs/Procedures/Meds    Lab Results Reviewed  reviewed noted Hgb A1C=14.1% on 7/31/19             Nutrition Prescription Ordered     Row Name 08/06/19 1310          Nutrition Prescription PO    Common Modifiers  Consistent Carbohydrate;Renal         Evaluation of Received Nutrient/Fluid Intake     Row Name 08/06/19 1310          PO Evaluation    Number of Meals  3     % PO Intake  75               Problem/Interventions:  Problem 1     Row Name 08/06/19 1311          Nutrition Diagnoses Problem 1    Problem 1  Nutrition Appropriate for Condition at this Time     Etiology (related to)  MNT for Treatment/Condition     Signs/Symptoms (evidenced by)  PO Intake     Percent (%) intake recorded  75 %     Over number of meals  3                 Intervention Goal     Row Name 08/06/19 1311          Intervention Goal    PO  Maintain intake         Nutrition Intervention     Row Name 08/06/19 1311          Nutrition Intervention    RD/Tech Action  Follow Tx progress           Education/Evaluation     Row Name 08/06/19 1311 08/06/19 1307       Education    Education  --  Education offered and refused; RD advised pt of Hgb A1C value of 14.0% on 7/31 and target of <7%. RD offered education and materials, but pt/wife state pt has had previous DM diet edu and refuse further today.         Monitor/Evaluation    Monitor  Per protocol  --          Electronically signed by:  Carolyn Mcgarry MS,RD,LD  08/06/19 1:12 PM

## 2019-08-06 NOTE — PROGRESS NOTES
Continued Stay Note  [facility]     Patient Name: Kendrick Crystal  MRN: [id number]  Today's Date: 8/6/2019    Admit Date: 7/30/2019    Discharge Plan     Row Name 08/06/19 0853       Plan    Plan  Home w/ [facility]    Patient/Family in Agreement with Plan  yes    Plan Comments  Spoke w/ patient and wife at bedside. Plan is home w/ [facility]. Will need orders. Denies any needs at this time. CM will continue to follow.    Final Discharge Disposition Code  01 - home or self-care        Discharge Codes    No documentation.       Expected Discharge Date and Time     Expected Discharge Date Expected Discharge Time    Aug 5, 2019             Fadi Valentine RN

## 2019-08-06 NOTE — PROGRESS NOTES
Kendrick Crystal     LOS: 7 days     Subjective     Sleeping comfortably.  Remains afebrile.  Urine output over a liter yesterday.  Morning labs pending.      Objective     Vital Signs  Vitals:    08/06/19 0502   BP: 158/93   Pulse: 70   Resp: 18   Temp: 97.2 °F (36.2 °C)   SpO2:        I/O  Intake & Output (last day)       08/05 0701 - 08/06 0700 08/06 0701 - 08/07 0700    IV Piggyback 100     Total Intake(mL/kg) 100 (0.9)     Urine (mL/kg/hr) 1150 (0.4)     Stool 0     Total Output 1150     Net -1050           Stool Unmeasured Occurrence 1 x           Physical Exam:  Abdomen soft and nontender.       Results Review:       WBC WBC   Date Value Ref Range Status   08/05/2019 13.41 (H) 3.40 - 10.80 10*3/mm3 Final   08/04/2019 12.34 (H) 3.40 - 10.80 10*3/mm3 Final   08/03/2019 16.57 (H) 3.40 - 10.80 10*3/mm3 Final      HGB Hemoglobin   Date Value Ref Range Status   08/05/2019 11.1 (L) 13.0 - 17.7 g/dL Final   08/04/2019 11.2 (L) 13.0 - 17.7 g/dL Final   08/03/2019 11.1 (L) 13.0 - 17.7 g/dL Final      HCT Hematocrit   Date Value Ref Range Status   08/05/2019 35.1 (L) 37.5 - 51.0 % Final   08/04/2019 36.5 (L) 37.5 - 51.0 % Final   08/03/2019 34.9 (L) 37.5 - 51.0 % Final      PLT        Results from last 7 days   Lab Units 08/05/19  0451   PLATELETS 10*3/mm3 217            Sodium Sodium   Date Value Ref Range Status   08/05/2019 132 (L) 136 - 145 mmol/L Final   08/04/2019 133 (L) 136 - 145 mmol/L Final   08/03/2019 136 136 - 145 mmol/L Final      Potassium Potassium   Date Value Ref Range Status   08/05/2019 5.7 (H) 3.5 - 5.2 mmol/L Final   08/04/2019 4.9 3.5 - 5.2 mmol/L Final   08/03/2019 4.9 3.5 - 5.2 mmol/L Final      Chloride Chloride   Date Value Ref Range Status   08/05/2019 102 98 - 107 mmol/L Final   08/04/2019 99 98 - 107 mmol/L Final   08/03/2019 101 98 - 107 mmol/L Final      Bicarbonate No results found for: PLASMABICARB   BUN BUN   Date Value Ref Range Status   08/05/2019 39 (H) 6 - 20 mg/dL Final   08/04/2019  28 (H) 6 - 20 mg/dL Final   08/03/2019 18 6 - 20 mg/dL Final      Creatinine Creatinine   Date Value Ref Range Status   08/05/2019 3.64 (H) 0.76 - 1.27 mg/dL Final   08/04/2019 3.01 (H) 0.76 - 1.27 mg/dL Final   08/03/2019 1.52 (H) 0.76 - 1.27 mg/dL Final      Calcium Calcium   Date Value Ref Range Status   08/05/2019 7.9 (L) 8.6 - 10.5 mg/dL Final   08/04/2019 7.9 (L) 8.6 - 10.5 mg/dL Final   08/03/2019 7.9 (L) 8.6 - 10.5 mg/dL Final      Magnesium No results found for: MG         Imaging Results (last 24 hours)     Procedure Component Value Units Date/Time    US Renal Limited [[id number]] Collected:  08/05/19 1722     Updated:  08/05/19 1723    Narrative:       EXAMINATION: US RENAL LIMITED-     INDICATION: juan      TECHNIQUE: Ultrasound kidneys and urinary bladder     COMPARISON: NONE     FINDINGS:      Right kidney measures 12.3 cm in length without evidence of  hydronephrosis, contour deforming mass or obvious calculi.  Left kidney measures 13.3 cm in length without evidence of  hydronephrosis, contour deforming mass or obvious calculi.     Urinary bladder is decompressed with Walker catheter in situ.          Impression:       No acute sonographic abnormality within the visualized  kidneys. No hydronephrosis. Walker catheter in situ with no gross  abnormality of the decompressed urinary bladder.                 Assessment/Plan       Sepsis (CMS/[facility])    DUNLAP Cirrhosis    Essential hypertension    Non-compliance    Hyperlipemia    Type 2 diabetes mellitus with circulatory disorder and uncontrolled    History of MRSA infection    JUAN (acute kidney injury) (CMS/[facility])    S/P BKA (below knee amputation), left (CMS/[facility])    Peripheral vascular disease (CMS/[facility])    DM (diabetes mellitus) type II uncontrolled, periph vascular disorder (CMS/[facility])    Abscess    UTI (urinary tract infection)    Hyponatremia  Status post drainage of perirectal abscess  Acute kidney injury  Poorly controlled diabetes mellitus    Awaiting morning  labs.  If patient has increasing white blood cell count or fever.  May wish to repeat pelvic CT scan to ensure that the abscess remains straight.      Mumtaz Payne MD  08/06/19  7:07 AM

## 2019-08-07 LAB
ANION GAP SERPL CALCULATED.3IONS-SCNC: 18 MMOL/L (ref 5–15)
BACTERIA SPEC ANAEROBE CULT: NORMAL
BASOPHILS # BLD AUTO: 0.07 10*3/MM3 (ref 0–0.2)
BASOPHILS NFR BLD AUTO: 0.6 % (ref 0–1.5)
BUN BLD-MCNC: 52 MG/DL (ref 6–20)
BUN/CREAT SERPL: 9.5 (ref 7–25)
CALCIUM SPEC-SCNC: 7.8 MG/DL (ref 8.6–10.5)
CHLORIDE SERPL-SCNC: 103 MMOL/L (ref 98–107)
CO2 SERPL-SCNC: 16 MMOL/L (ref 22–29)
CREAT BLD-MCNC: 5.47 MG/DL (ref 0.76–1.27)
DEPRECATED RDW RBC AUTO: 52.3 FL (ref 37–54)
EOSINOPHIL # BLD AUTO: 0.56 10*3/MM3 (ref 0–0.4)
EOSINOPHIL NFR BLD AUTO: 4.5 % (ref 0.3–6.2)
ERYTHROCYTE [DISTWIDTH] IN BLOOD BY AUTOMATED COUNT: 16.2 % (ref 12.3–15.4)
GFR SERPL CREATININE-BSD FRML MDRD: 11 ML/MIN/1.73
GFR SERPL CREATININE-BSD FRML MDRD: ABNORMAL ML/MIN/1.73
GLUCOSE BLD-MCNC: 112 MG/DL (ref 65–99)
GLUCOSE BLDC GLUCOMTR-MCNC: 113 MG/DL (ref 70–130)
GLUCOSE BLDC GLUCOMTR-MCNC: 139 MG/DL (ref 70–130)
GLUCOSE BLDC GLUCOMTR-MCNC: 80 MG/DL (ref 70–130)
GLUCOSE BLDC GLUCOMTR-MCNC: 96 MG/DL (ref 70–130)
HCT VFR BLD AUTO: 34.7 % (ref 37.5–51)
HGB BLD-MCNC: 10.8 G/DL (ref 13–17.7)
IMM GRANULOCYTES # BLD AUTO: 0.09 10*3/MM3 (ref 0–0.05)
IMM GRANULOCYTES NFR BLD AUTO: 0.7 % (ref 0–0.5)
LYMPHOCYTES # BLD AUTO: 1.64 10*3/MM3 (ref 0.7–3.1)
LYMPHOCYTES NFR BLD AUTO: 13.1 % (ref 19.6–45.3)
MCH RBC QN AUTO: 27.3 PG (ref 26.6–33)
MCHC RBC AUTO-ENTMCNC: 31.1 G/DL (ref 31.5–35.7)
MCV RBC AUTO: 87.8 FL (ref 79–97)
MONOCYTES # BLD AUTO: 1.09 10*3/MM3 (ref 0.1–0.9)
MONOCYTES NFR BLD AUTO: 8.7 % (ref 5–12)
NEUTROPHILS # BLD AUTO: 9.03 10*3/MM3 (ref 1.7–7)
NEUTROPHILS NFR BLD AUTO: 72.4 % (ref 42.7–76)
NRBC BLD AUTO-RTO: 0 /100 WBC (ref 0–0.2)
PLATELET # BLD AUTO: 222 10*3/MM3 (ref 140–450)
PMV BLD AUTO: 11.8 FL (ref 6–12)
POTASSIUM BLD-SCNC: 5.3 MMOL/L (ref 3.5–5.2)
RBC # BLD AUTO: 3.95 10*6/MM3 (ref 4.14–5.8)
SODIUM BLD-SCNC: 137 MMOL/L (ref 136–145)
WBC NRBC COR # BLD: 12.48 10*3/MM3 (ref 3.4–10.8)

## 2019-08-07 PROCEDURE — 25010000002 HEPARIN (PORCINE) PER 1000 UNITS: Performed by: INTERNAL MEDICINE

## 2019-08-07 PROCEDURE — 80048 BASIC METABOLIC PNL TOTAL CA: CPT | Performed by: INTERNAL MEDICINE

## 2019-08-07 PROCEDURE — 82962 GLUCOSE BLOOD TEST: CPT

## 2019-08-07 PROCEDURE — 85025 COMPLETE CBC W/AUTO DIFF WBC: CPT | Performed by: COLON & RECTAL SURGERY

## 2019-08-07 PROCEDURE — 99233 SBSQ HOSP IP/OBS HIGH 50: CPT | Performed by: INTERNAL MEDICINE

## 2019-08-07 PROCEDURE — 63710000001 INSULIN DETEMIR PER 5 UNITS: Performed by: INTERNAL MEDICINE

## 2019-08-07 PROCEDURE — 25010000002 CEFTRIAXONE PER 250 MG: Performed by: INTERNAL MEDICINE

## 2019-08-07 PROCEDURE — 25010000003 MICAFUNGIN SODIUM 100 MG RECONSTITUTED SOLUTION: Performed by: INTERNAL MEDICINE

## 2019-08-07 RX ORDER — SODIUM BICARBONATE 650 MG/1
1300 TABLET ORAL 2 TIMES DAILY
Status: DISCONTINUED | OUTPATIENT
Start: 2019-08-07 | End: 2019-08-14 | Stop reason: HOSPADM

## 2019-08-07 RX ORDER — METOPROLOL TARTRATE 100 MG/1
100 TABLET ORAL EVERY 12 HOURS SCHEDULED
Status: DISCONTINUED | OUTPATIENT
Start: 2019-08-07 | End: 2019-08-11

## 2019-08-07 RX ORDER — AMLODIPINE BESYLATE 5 MG/1
5 TABLET ORAL ONCE
Status: COMPLETED | OUTPATIENT
Start: 2019-08-07 | End: 2019-08-07

## 2019-08-07 RX ORDER — AMLODIPINE BESYLATE 10 MG/1
10 TABLET ORAL
Status: DISCONTINUED | OUTPATIENT
Start: 2019-08-08 | End: 2019-08-14 | Stop reason: HOSPADM

## 2019-08-07 RX ADMIN — Medication 250 MG: at 23:51

## 2019-08-07 RX ADMIN — METOPROLOL TARTRATE 100 MG: 100 TABLET ORAL at 23:51

## 2019-08-07 RX ADMIN — DULOXETINE HYDROCHLORIDE 30 MG: 30 CAPSULE, DELAYED RELEASE ORAL at 08:44

## 2019-08-07 RX ADMIN — HEPARIN SODIUM 5000 UNITS: 5000 INJECTION INTRAVENOUS; SUBCUTANEOUS at 23:52

## 2019-08-07 RX ADMIN — METRONIDAZOLE 500 MG: 500 INJECTION, SOLUTION INTRAVENOUS at 08:41

## 2019-08-07 RX ADMIN — MICAFUNGIN SODIUM 100 MG: 20 INJECTION, POWDER, LYOPHILIZED, FOR SOLUTION INTRAVENOUS at 11:35

## 2019-08-07 RX ADMIN — METRONIDAZOLE 500 MG: 500 INJECTION, SOLUTION INTRAVENOUS at 02:29

## 2019-08-07 RX ADMIN — INSULIN DETEMIR 32 UNITS: 100 INJECTION, SOLUTION SUBCUTANEOUS at 08:45

## 2019-08-07 RX ADMIN — AMLODIPINE BESYLATE 5 MG: 5 TABLET ORAL at 08:44

## 2019-08-07 RX ADMIN — TAMSULOSIN HYDROCHLORIDE 0.8 MG: 0.4 CAPSULE ORAL at 23:51

## 2019-08-07 RX ADMIN — AMLODIPINE BESYLATE 5 MG: 5 TABLET ORAL at 14:54

## 2019-08-07 RX ADMIN — METRONIDAZOLE 500 MG: 500 INJECTION, SOLUTION INTRAVENOUS at 17:13

## 2019-08-07 RX ADMIN — HEPARIN SODIUM 5000 UNITS: 5000 INJECTION INTRAVENOUS; SUBCUTANEOUS at 12:51

## 2019-08-07 RX ADMIN — METRONIDAZOLE 500 MG: 500 INJECTION, SOLUTION INTRAVENOUS at 23:50

## 2019-08-07 RX ADMIN — HEPARIN SODIUM 5000 UNITS: 5000 INJECTION INTRAVENOUS; SUBCUTANEOUS at 06:17

## 2019-08-07 RX ADMIN — SODIUM CHLORIDE, PRESERVATIVE FREE 10 ML: 5 INJECTION INTRAVENOUS at 23:52

## 2019-08-07 RX ADMIN — SODIUM BICARBONATE 650 MG TABLET 1300 MG: at 23:51

## 2019-08-07 RX ADMIN — CEFTRIAXONE SODIUM 1 G: 1 INJECTION, POWDER, FOR SOLUTION INTRAMUSCULAR; INTRAVENOUS at 17:12

## 2019-08-07 RX ADMIN — Medication 250 MG: at 08:43

## 2019-08-07 RX ADMIN — METOPROLOL TARTRATE 75 MG: 50 TABLET ORAL at 08:43

## 2019-08-07 NOTE — PROGRESS NOTES
[facility] Progress Note        Antibiotics:  Anti-Infectives (From admission, onward)    Ordered     Dose/Rate Route Frequency Start Stop    19 1426  cefTRIAXone (ROCEPHIN) 1 g/100 mL 0.9% NS (MBP)     Ordering Provider:  Shahbaz Valdivia MD    1 g  over 30 Minutes Intravenous Every 24 Hours 19 1600 19 1559    19 1426  metroNIDAZOLE (FLAGYL) 500 mg/100mL IVPB     Ordering Provider:  Shahbaz Valdivia MD    500 mg  over 60 Minutes Intravenous Every 8 Hours 19 1600 19 1559    19 0955  micafungin 100 mg/100 mL 0.9% NS IVPB (mbp)     Ordering Provider:  Usman Dong MD    100 mg  over 60 Minutes Intravenous Every 24 Hours 19 1100 08/10/19 1059    19 2051  [MAR Hold]  piperacillin-tazobactam (ZOSYN) 3.375 g in iso-osmotic dextrose 50 ml (premix)     (MAR Hold since 19 1447)   Comments:  First dose given in ED.   Ordering Provider:  Keyanna Aponte APRN    3.375 g  over 4 Hours Intravenous Every 8 Hours 19 0100 19 0059    19 1820  vancomycin 2250 mg/500 mL 0.9% NS IVPB ([facility])     Ordering Provider:  Kendrick Sotelo MD    20 mg/kg × 113 kg  over 3 Hours Intravenous Once 19 1822 19 2155    19 1820  piperacillin-tazobactam (ZOSYN) 3.375 g in iso-osmotic dextrose 50 ml (premix)     Ordering Provider:  Kendrick Sotelo MD    3.375 g  over 30 Minutes Intravenous Once 19 1822 19 1924          CC: rectal pain    HPI:    Patient is a 48 y.o.  Yr old male with history of poorly contolled T2DM, DUNLAP/liver cirrhosis, HTN, PVD/Left BKA, and multiple skin abscesses/MRSA who presented to [facility] ED with right shin wound infection summer 2018.  He was unable to get to see Dr. Martinez as his father passed away unexpectedly on 18 and he had to wait until after the .  The wound worsened becoming gangrenous and he came to [facility] ED in 2018.  He also had been hospitalized at [facility] and then  transferred to  for a severe penis/scrotum infection requiring surgical debridement in summer 2018.  He received antibiotics regarding his right leg infection, generally improved over the remainder of summer 2018 but that area had not completely healed. He was admitted April 24, 2019 with acute left lower abdominal wall redness/swelling and pain, spontaneous drainage associated with fever/chills and uncontrolled blood sugars.  4/26 I&D requiring wide debridement , severe/deep infection and transferred to [facility] on May 3 with IV Zosyn/oral doxycycline. Cultures had lactobacillus and mixed gram-positive skin sherly including alpha strep, staph epidermidis and corynebacterium. Readmitted to [facility] May 18, 2019, increasing generalized edema ultimately transitioned to oral doxycycline and healed.     He presented to the emergency room July 30, 2019 with acute rectal pain that had begun 7/27; this is associated with constipation for days, chills and nausea/dry heaving.  White blood cell count elevated, high hemoglobin A1c over 13, urinalysis with hematuria/pyuria and CT scan with 3 x 3 cm fluid collection anterior to the distal rectum and per discussion with Dr. Akbar/Jennifer, this is not in the prostate.  Colorectal surgery/urology saw him for consideration of surgical options/timing/threshold, etc..     8/2/19 I&Dper Dr Payne perirectal abscess; patient reports that he had instrumented his rectum prior to hospitalization with enema    8/3/19 urinary retention overnight requiring in/out catheterization per patient.  Creat climb    8/4/19 further creat climb; childs placed and lisinopril stopped and d/w Dr Rubio    8/7 in retrospect he remembers air in his urine at admit which has raised concern for possible fistula from urinary tract;  No fecaluria and culture from abscess is fecal sherly;  ?rectal-urethral fistula;  Dr Payne aware     8/7/19  He reports fatigue and some perirectal pain but not  "progressive , currently dull, nonradiating, better with pain meds, 3 out of 10 at present.  He denies hematochezia melena or hematemesis.  No diarrhea.  Recent Constipation as above.  No  hematuria or pyuria.       No headache photophobia or neck stiffness.  No shortness of breath cough or hemoptysis.  No skin rash.       ROS:      8/7/19 No f/c/s. No n/v/d. No rash. No new ADR to Abx.       Constitutional--appetite diminished with malaise.  No sweats   Heent-- No new vision, hearing or throat complaints.  No epistaxis or oral sores.  Denies odynophagia or dysphagia.  No flashers, floaters or eye pain. No odynophagia or dysphagia. No headache, photophobia or neck stiffness.  CV-- No chest pain, palpitation or syncope  Resp-- No SOB/cough/Hemoptysis  GI- No  hematochezia, melena, or hematemesis. Denies jaundice ; he has chronic liver disease  --as above.  No dysuria, hematuria, or flank pain.  Denies hesitancy, urgency or flank pain.  Lymph- no swollen lymph nodes in neck/axilla or groin.   Heme- No active bruising or bleeding; no Hx of DVT or PE.  MS-- no swelling or pain in the bones or joints of arms/legs.  No new back pain.  Neuro-- No acute focal weakness or numbness in the arms or legs.  No seizures.     Full 12 point review of systems reviewed and negative otherwise for acute complaints, except for above          PE:   /97 (BP Location: Left arm, Patient Position: Lying)   Pulse 72   Temp 97.3 °F (36.3 °C) (Oral)   Resp 18   Ht 190.5 cm (75\")   Wt 113 kg (250 lb)   SpO2 96% Comment: portable O2  BMI 31.25 kg/m²       GENERAL: sleepy, in no acute distress.   HEENT: Normocephalic, atraumatic.  PERRL. EOMI. No conjunctival injection. No icterus. Oropharynx clear without evidence of thrush or exudate. No evidence of peridontal disease.    NECK: Supple without nuchal rigidity. No mass.  LYMPH: No cervical, axillary or inguinal lymphadenopathy.  HEART: RRR; No murmur, rubs, gallops.   LUNGS: Clear to " auscultation bilaterally without wheezing, rales, rhonchi. Normal respiratory effort. Nonlabored. No dullness.  ABDOMEN: Soft, nontender, nondistended. Positive bowel sounds. No rebound or guarding. NO mass or HSM.  EXT:  No cyanosis, clubbing or edema. No cord.  : Genitalia generally unremarkable.  Unable to elicit any perineal tenderness.  No discrete mass bulge or fluctuance.  No crepitus or bulla.  I am unable to elicit any perianal tenderness with no obvious bulge or fluctuance there either.  Slight candidiasis  MSK: FROM without joint effusions noted arms/legs.    SKIN: Warm and dry without cutaneous eruptions on Inspection/palpation.    NEURO: sleepy     No peripheral stigmata/phenomena of endocarditis           Laboratory Data    Results from last 7 days   Lab Units 08/07/19  0624 08/06/19  0905 08/05/19  0451   WBC 10*3/mm3 12.48* 14.44* 13.41*   HEMOGLOBIN g/dL 10.8* 11.6* 11.1*   HEMATOCRIT % 34.7* 38.1 35.1*   PLATELETS 10*3/mm3 222 232 217     Results from last 7 days   Lab Units 08/07/19  0621   SODIUM mmol/L 137   POTASSIUM mmol/L 5.3*   CHLORIDE mmol/L 103   CO2 mmol/L 16.0*   BUN mg/dL 52*   CREATININE mg/dL 5.47*   GLUCOSE mg/dL 112*   CALCIUM mg/dL 7.8*     Results from last 7 days   Lab Units 08/06/19  0905   ALK PHOS U/L 201*   BILIRUBIN mg/dL 0.3   ALT (SGPT) U/L 20   AST (SGOT) U/L 17               Estimated Creatinine Clearance: 21.9 mL/min (A) (by C-G formula based on SCr of 5.47 mg/dL (H)).      Microbiology:      Radiology:  Imaging Results (last 72 hours)     Procedure Component Value Units Date/Time    CT Abdomen Pelvis With Contrast [[id number]] Collected:  07/30/19 1749     Updated:  07/31/19 1034    Narrative:       EXAMINATION: CT ABDOMEN PELVIS W CONTRAST- 07/30/2019      INDICATION: lower abd pain     TECHNIQUE: CT scan of the abdomen and pelvis was performed with  intravenous contrast.     The radiation dose reduction device was turned on for each scan per the  ALARA (As Low as  Reasonably Achievable) protocol.     COMPARISON: 05/30/2019     FINDINGS: The most superior images demonstrate no basilar inflammatory  inflammatory process or pleural effusion. Small effusions have resolved  since previous examination. The liver and spleen are normal. There are  small bilateral fat-containing adrenal nodules consistent bilateral  myolipomas, unchanged. The pancreas is normal. There is no renal mass,  stone or obstruction. There is no ascites,  aneurysm or retroperitoneal  lymphadenopathy. There is diffuse thickening of the bladder wall,  probably related to the peroneal abscess. There is no inguinal  lymphadenopathy. There are sigmoid diverticula without features of  diverticulitis. The appendix is normal.     Just inferior to the prostate gland and anterior to the distal rectum  there is a 3.0 x 3.2 cm fluid collection with enhancing margins  consistent with an abscess.       Impression:       There is a 3.0 x 3.2 cm fluid collection with enhancing  margins consistent with an abscess related to the inferior aspect of the  prostate gland and anterior to the distal rectum.     D:  07/30/2019  E:  07/31/2019     This report was finalized on 7/31/2019 10:31 AM by Dr. Guillermo Akbar MD.               Impression:     --Acute sepsis per internal medicine admission note, concern for abscess in the perirectal tissue as etiology.  Empiric antibiotics ongoing with risk for mixed infection.  drainage 8/2; culture data pending.  Broad-spectrum antimicrobials ongoing.     --Acute perirectal abscess associated with constipation as above, air in urine as above.  Colorectal surgery, Dr. Payne following to help guide timing/options/threshold for further drainage if needed.  Drainage as above on August 2 ;  Mixed Fecal sherly in culture     --History urinary retention.    Recurrent retention overnight August 2-August 3.  Medicine following to help guide further work-up, urology input, etc.    --ARF 8/3-8/7;   ?obstruction initially associated with retention postop (1200 out with I/O cath per nursing ) -v- contrast from CT -v- lisinopril/medication/vancomycin -v- relative hypotension -v- likely combination of factors with ATN; nephrology following; vancomycin trough high and vancomycin stopped empirically 8/3 although high trough potentially accumulation as a consequence of diminishing renal function associated with retention/contrast/pressure rather than cause.  Nonetheless, avoid for now    --Cutaneous candidiasis and yeast in gram stain of abscess.  Mycamine added     --History MRSA;  Not in current culture     --Diabetes mellitus type 2, uncontrolled.  He reports the reason for this is forgetfulness     --History Johnston and chronic liver disease per past notes     --Left BKA     --Peripheral vascular disease    --hyperkalemia per medicine/nephrology        PLAN:    --IV rocephin/flagyl/ Mycamine     --Check/review labs cultures and scans     --Discussed with microbiology     --History per nursing staff     --Discussed with Dr. Valdivia/Elmer     -Discussed with family, partial history per them     --Highly complex set of issues with high risk for further serious morbidity and other serious sequela    --nephrology following;  May require dialysis    --I am out of town until 8/13; partners to cover            Usman Dong MD  8/7/2019

## 2019-08-07 NOTE — PROGRESS NOTES
"NAL Progress Note  Kendrick Crystal  [id number]  1970 7/30/2019    Reason for visit:  JUAN    No overnight events denies chest pain or shortness of breath  Kidney function continue get worse is making good amount of urine    ROS:    Pulm:  No SOA  CV:  No CP    I&O:    Intake/Output Summary (Last 24 hours) at 8/7/2019 1303  Last data filed at 8/7/2019 0431  Gross per 24 hour   Intake 100 ml   Output 1450 ml   Net -1350 ml       PE:   Blood pressure (!) 185/101, pulse 73, temperature 97.7 °F (36.5 °C), temperature source Oral, resp. rate 18, height 190.5 cm (75\"), weight 113 kg (250 lb), SpO2 96 %.    GENERAL: Awake and alert, in no acute distress.   HEENT: PER, No conjunctivitis  NECK: Supple, no JVD     HEART: RRR; No murmur, rubs, gallops.   LUNGS: Clear to auscultation bilaterally without wheezing, rales, rhonchi. Normal respiratory effort. Nonlabored. No dullness.  ABDOMEN: Soft, nontender, nondistended. Positive bowel sounds. No rebound or guarding. NO mass or HSM.  EXT:  No cyanosis, clubbing, + edema. No cord.  : Genitalia generally unremarkable.  Without Walker catheter.  SKIN: Warm and dry without cutaneous eruptions on Inspection/palpation.    NEURO: Alert and oriented x 3, Motor 5/5 bilaterally    Medications:    Current Facility-Administered Medications:   •  [START ON 8/8/2019] amLODIPine (NORVASC) tablet 10 mg, 10 mg, Oral, Q24H, Jaime Rubio MD  •  cefTRIAXone (ROCEPHIN) 1 g/100 mL 0.9% NS (MBP), 1 g, Intravenous, Q24H, Shahbaz Valdivia MD, 1 g at 08/06/19 1619  •  dextrose (D50W) 25 g/ 50mL Intravenous Solution 25 g, 25 g, Intravenous, Q15 Min PRN, Keyanna Aponte APRN  •  dextrose (GLUTOSE) oral gel 15 g, 15 g, Oral, Q15 Min PRN, Keyanna Aponte APRN  •  DULoxetine (CYMBALTA) DR capsule 30 mg, 30 mg, Oral, Daily, Keyanna Aponte APRN, 30 mg at 08/07/19 0844  •  glucagon (human recombinant) (GLUCAGEN DIAGNOSTIC) injection 1 mg, 1 mg, Subcutaneous, PRN, Keyanna Aponte APRN  •  heparin " (porcine) 5000 UNIT/ML injection 5,000 Units, 5,000 Units, Subcutaneous, Q8H, Brenda Gonzales MD, 5,000 Units at 08/07/19 1251  •  HYDROcodone-acetaminophen (NORCO)  MG per tablet 1 tablet, 1 tablet, Oral, Q6H PRN, Keyanna Aponte APRN, 1 tablet at 08/06/19 2129  •  HYDROmorphone (DILAUDID) injection 1 mg, 1 mg, Intravenous, Q3H PRN, Keyanna Aponte APRN  •  insulin detemir (LEVEMIR) injection 32 Units, 32 Units, Subcutaneous, Q12H, Shahbaz Valdivia MD, 32 Units at 08/07/19 0845  •  insulin lispro (humaLOG) injection 0-9 Units, 0-9 Units, Subcutaneous, 4x Daily With Meals & Nightly, Keyanna Aponte APRN, 2 Units at 08/06/19 0847  •  insulin lispro (humaLOG) injection 6 Units, 6 Units, Subcutaneous, TID With Meals, Shahbaz Valdivia MD, 6 Units at 08/07/19 1251  •  metoprolol tartrate (LOPRESSOR) tablet 100 mg, 100 mg, Oral, Q12H, Jaime Rubio MD  •  metroNIDAZOLE (FLAGYL) 500 mg/100mL IVPB, 500 mg, Intravenous, Q8H, Shahbaz Valdivia MD, 500 mg at 08/07/19 0841  •  micafungin 100 mg/100 mL 0.9% NS IVPB (mbp), 100 mg, Intravenous, Q24H, Usman Dong MD, 100 mg at 08/07/19 1135  •  ondansetron (ZOFRAN) tablet 4 mg, 4 mg, Oral, Q6H PRN **OR** ondansetron (ZOFRAN) injection 4 mg, 4 mg, Intravenous, Q6H PRN, Keyanna Aponte APRN, 4 mg at 08/05/19 0654  •  saccharomyces boulardii (FLORASTOR) capsule 250 mg, 250 mg, Oral, BID, Keyanna Aponte APRN, 250 mg at 08/07/19 0843  •  sodium chloride 0.9 % flush 10 mL, 10 mL, Intravenous, PRN, Sotelo, Kendrick Mary MD, 10 mL at 08/04/19 0807  •  tamsulosin (FLOMAX) 24 hr capsule 0.8 mg, 0.8 mg, Oral, Nightly, Keyanna Aponte APRN, 0.8 mg at 08/06/19 1928    Meds reviewed and doses adjusted for renal function    Labs:  Results from last 7 days   Lab Units 08/07/19  0624 08/07/19  0621 08/06/19  1109 08/06/19  0905 08/05/19  0451 08/04/19  0855 08/03/19  0829 08/02/19  0728 08/02/19  0727 08/01/19  0656   WBC 10*3/mm3 12.48*  --   --  14.44* 13.41* 12.34* 16.57* 12.17*   --  11.27*   HEMOGLOBIN g/dL 10.8*  --   --  11.6* 11.1* 11.2* 11.1* 11.5*  --  11.9*   HEMATOCRIT % 34.7*  --   --  38.1 35.1* 36.5* 34.9* 36.0*  --  37.6   PLATELETS 10*3/mm3 222  --   --  232 217 216 230 223  --  236   SODIUM mmol/L  --  137 137 136 132* 133* 136  --  133* 129*   POTASSIUM mmol/L  --  5.3* 5.4* 5.6* 5.7* 4.9 4.9  --  4.8 4.7   CHLORIDE mmol/L  --  103 103 106 102 99 101  --  99 96*   CO2 mmol/L  --  16.0* 16.0* 13.0* 17.0* 20.0* 24.0  --  25.0 21.0*   BUN mg/dL  --  52* 47* 48* 39* 28* 18  --  14 23*   CREATININE mg/dL  --  5.47* 4.57* 4.68* 3.64* 3.01* 1.52*  --  0.74* 0.79   GLUCOSE mg/dL  --  112* 158* 178* 190* 268* 247*  --  418* 564*   CALCIUM mg/dL  --  7.8* 7.5* 8.0* 7.9* 7.9* 7.9*  --  8.3* 8.1*   URIC ACID mg/dL  --   --   --   --   --  5.0  --   --   --   --              Radiology:  Imaging Results (last 72 hours)     ** No results found for the last 72 hours. **          Renal Imaging:  Reviewed  Us Renal Limited    Result Date: 8/6/2019  No acute sonographic abnormality within the visualized kidneys. No hydronephrosis. Walker catheter in situ with no gross abnormality of the decompressed urinary bladder.   D:  08/05/2019 E:  08/06/2019            IMPRESSION:  Acute kidney injury-likely related to ATN there is a high likelihood that this is related to vancomycin toxicity history of vancomycin level was quite high but this is also a multifactorial issue as his blood pressure was relatively low in the last 48 hours in addition he was taking lisinopril  Worsening renal function  Chronic kidney disease he does have recently started having protein in his urine  Hypertension  Poorly controlled diabetes  Peripheral vascular disease  Hyperkalemia    RECOMMENDATIONS:  Kidney function is getting worse his kidney function are in line with possible vancomycin toxicity and ATN  He still making urine but his potassium is rising too.  Continue avoid high potassium foods keep him on renal  diet  Discontinue IV fluids discussed with the nurse  I will raise amlodipine for high blood pressure and also metoprolol  I will add sodium bicarbonate  I am afraid he is heading towards need for dialysis discussed with family     Please note that portions of this note were completed with a voice recognition program efforts were made to add at the dictations but words may be mistranscribed.    Jaime Rubio MD  8/7/2019  1:03 PM

## 2019-08-07 NOTE — PROGRESS NOTES
Kendrick Crystal     LOS: 8 days     Subjective   Patient feels better this morning.  Resting comfortably.  Remains afebrile.  Morning labs pending.  Urine output over 2.5 L.      Objective     Vital Signs  Vitals:    08/07/19 0431   BP: 159/92   Pulse: 71   Resp: 18   Temp: 97.1 °F (36.2 °C)   SpO2:        I/O  Intake & Output (last day)       08/06 0701 - 08/07 0700 08/07 0701 - 08/08 0700    IV Piggyback 100     Total Intake(mL/kg) 100 (0.9)     Urine (mL/kg/hr) 2750 (1)     Stool      Total Output 2750     Net -2650                 Physical Exam:  Abdomen soft and nontender.  Perineum nontender without erythema or fluctuance.       Results Review:       WBC WBC   Date Value Ref Range Status   08/06/2019 14.44 (H) 3.40 - 10.80 10*3/mm3 Final   08/05/2019 13.41 (H) 3.40 - 10.80 10*3/mm3 Final   08/04/2019 12.34 (H) 3.40 - 10.80 10*3/mm3 Final      HGB Hemoglobin   Date Value Ref Range Status   08/06/2019 11.6 (L) 13.0 - 17.7 g/dL Final   08/05/2019 11.1 (L) 13.0 - 17.7 g/dL Final   08/04/2019 11.2 (L) 13.0 - 17.7 g/dL Final      HCT Hematocrit   Date Value Ref Range Status   08/06/2019 38.1 37.5 - 51.0 % Final   08/05/2019 35.1 (L) 37.5 - 51.0 % Final   08/04/2019 36.5 (L) 37.5 - 51.0 % Final      PLT        Results from last 7 days   Lab Units 08/06/19  0905   PLATELETS 10*3/mm3 232            Sodium Sodium   Date Value Ref Range Status   08/06/2019 137 136 - 145 mmol/L Final   08/06/2019 136 136 - 145 mmol/L Final   08/05/2019 132 (L) 136 - 145 mmol/L Final   08/04/2019 133 (L) 136 - 145 mmol/L Final      Potassium Potassium   Date Value Ref Range Status   08/06/2019 5.4 (H) 3.5 - 5.2 mmol/L Final   08/06/2019 5.6 (H) 3.5 - 5.2 mmol/L Final   08/05/2019 5.7 (H) 3.5 - 5.2 mmol/L Final   08/04/2019 4.9 3.5 - 5.2 mmol/L Final      Chloride Chloride   Date Value Ref Range Status   08/06/2019 103 98 - 107 mmol/L Final   08/06/2019 106 98 - 107 mmol/L Final   08/05/2019 102 98 - 107 mmol/L Final   08/04/2019 99 98 - 107  mmol/L Final      Bicarbonate No results found for: PLASMABICARB   BUN BUN   Date Value Ref Range Status   08/06/2019 47 (H) 6 - 20 mg/dL Final   08/06/2019 48 (H) 6 - 20 mg/dL Final   08/05/2019 39 (H) 6 - 20 mg/dL Final   08/04/2019 28 (H) 6 - 20 mg/dL Final      Creatinine Creatinine   Date Value Ref Range Status   08/06/2019 4.57 (H) 0.76 - 1.27 mg/dL Final   08/06/2019 4.68 (H) 0.76 - 1.27 mg/dL Final   08/05/2019 3.64 (H) 0.76 - 1.27 mg/dL Final   08/04/2019 3.01 (H) 0.76 - 1.27 mg/dL Final      Calcium Calcium   Date Value Ref Range Status   08/06/2019 7.5 (L) 8.6 - 10.5 mg/dL Final   08/06/2019 8.0 (L) 8.6 - 10.5 mg/dL Final   08/05/2019 7.9 (L) 8.6 - 10.5 mg/dL Final   08/04/2019 7.9 (L) 8.6 - 10.5 mg/dL Final        Assessment/Plan       Sepsis (CMS/[facility])    DUNLAP Cirrhosis    Essential hypertension    Non-compliance    Hyperlipemia    Type 2 diabetes mellitus with circulatory disorder and uncontrolled    History of MRSA infection    JUAN (acute kidney injury) (CMS/[facility])    S/P BKA (below knee amputation), left (CMS/[facility])    Peripheral vascular disease (CMS/[facility])    DM (diabetes mellitus) type II uncontrolled, periph vascular disorder (CMS/[facility])    Abscess    UTI (urinary tract infection)    Hyponatremia  Patient status post drainage of perirectal abscess.  This aspect appears to be improving.    Hopefully acute kidney injury is improving as well. Awaiting morning labs.    Mumtaz Payne MD  08/07/19  7:38 AM

## 2019-08-07 NOTE — PROGRESS NOTES
Continued Stay Note  [facility]     Patient Name: Kendrick Crystal  MRN: [id number]  Today's Date: 8/7/2019    Admit Date: 7/30/2019    Discharge Plan     Row Name 08/07/19 1117       Plan    Plan Comments  Pt not currently ready for discharge. CM will continue to follow and arrange HH and dialisys if needed.         Discharge Codes    No documentation.       Expected Discharge Date and Time     Expected Discharge Date Expected Discharge Time    Aug 9, 2019             Caorl Chu RN

## 2019-08-08 LAB
ANION GAP SERPL CALCULATED.3IONS-SCNC: 17 MMOL/L (ref 5–15)
BASOPHILS # BLD AUTO: 0.06 10*3/MM3 (ref 0–0.2)
BASOPHILS NFR BLD AUTO: 0.4 % (ref 0–1.5)
BUN BLD-MCNC: 61 MG/DL (ref 6–20)
BUN/CREAT SERPL: 12.3 (ref 7–25)
CALCIUM SPEC-SCNC: 8.1 MG/DL (ref 8.6–10.5)
CHLORIDE SERPL-SCNC: 104 MMOL/L (ref 98–107)
CO2 SERPL-SCNC: 17 MMOL/L (ref 22–29)
CREAT BLD-MCNC: 4.94 MG/DL (ref 0.76–1.27)
DEPRECATED RDW RBC AUTO: 50.8 FL (ref 37–54)
EOSINOPHIL # BLD AUTO: 0.4 10*3/MM3 (ref 0–0.4)
EOSINOPHIL NFR BLD AUTO: 2.9 % (ref 0.3–6.2)
ERYTHROCYTE [DISTWIDTH] IN BLOOD BY AUTOMATED COUNT: 16.2 % (ref 12.3–15.4)
GFR SERPL CREATININE-BSD FRML MDRD: 13 ML/MIN/1.73
GFR SERPL CREATININE-BSD FRML MDRD: ABNORMAL ML/MIN/1.73
GLUCOSE BLD-MCNC: 234 MG/DL (ref 65–99)
GLUCOSE BLDC GLUCOMTR-MCNC: 103 MG/DL (ref 70–130)
GLUCOSE BLDC GLUCOMTR-MCNC: 126 MG/DL (ref 70–130)
GLUCOSE BLDC GLUCOMTR-MCNC: 241 MG/DL (ref 70–130)
GLUCOSE BLDC GLUCOMTR-MCNC: 305 MG/DL (ref 70–130)
HCT VFR BLD AUTO: 35.1 % (ref 37.5–51)
HGB BLD-MCNC: 10.9 G/DL (ref 13–17.7)
IMM GRANULOCYTES # BLD AUTO: 0.13 10*3/MM3 (ref 0–0.05)
IMM GRANULOCYTES NFR BLD AUTO: 1 % (ref 0–0.5)
LYMPHOCYTES # BLD AUTO: 1.85 10*3/MM3 (ref 0.7–3.1)
LYMPHOCYTES NFR BLD AUTO: 13.6 % (ref 19.6–45.3)
MCH RBC QN AUTO: 27 PG (ref 26.6–33)
MCHC RBC AUTO-ENTMCNC: 31.1 G/DL (ref 31.5–35.7)
MCV RBC AUTO: 86.9 FL (ref 79–97)
MONOCYTES # BLD AUTO: 0.96 10*3/MM3 (ref 0.1–0.9)
MONOCYTES NFR BLD AUTO: 7.1 % (ref 5–12)
NEUTROPHILS # BLD AUTO: 10.21 10*3/MM3 (ref 1.7–7)
NEUTROPHILS NFR BLD AUTO: 75 % (ref 42.7–76)
NRBC BLD AUTO-RTO: 0 /100 WBC (ref 0–0.2)
PLATELET # BLD AUTO: 246 10*3/MM3 (ref 140–450)
PMV BLD AUTO: 11.7 FL (ref 6–12)
POTASSIUM BLD-SCNC: 5.2 MMOL/L (ref 3.5–5.2)
RBC # BLD AUTO: 4.04 10*6/MM3 (ref 4.14–5.8)
SODIUM BLD-SCNC: 138 MMOL/L (ref 136–145)
WBC NRBC COR # BLD: 13.61 10*3/MM3 (ref 3.4–10.8)

## 2019-08-08 PROCEDURE — 82962 GLUCOSE BLOOD TEST: CPT

## 2019-08-08 PROCEDURE — 99233 SBSQ HOSP IP/OBS HIGH 50: CPT | Performed by: INTERNAL MEDICINE

## 2019-08-08 PROCEDURE — 63710000001 INSULIN DETEMIR PER 5 UNITS: Performed by: INTERNAL MEDICINE

## 2019-08-08 PROCEDURE — 80048 BASIC METABOLIC PNL TOTAL CA: CPT | Performed by: COLON & RECTAL SURGERY

## 2019-08-08 PROCEDURE — 25010000002 HEPARIN (PORCINE) PER 1000 UNITS: Performed by: INTERNAL MEDICINE

## 2019-08-08 PROCEDURE — 25010000002 CEFTRIAXONE PER 250 MG: Performed by: INTERNAL MEDICINE

## 2019-08-08 PROCEDURE — 85025 COMPLETE CBC W/AUTO DIFF WBC: CPT | Performed by: COLON & RECTAL SURGERY

## 2019-08-08 RX ORDER — TERAZOSIN 5 MG/1
5 CAPSULE ORAL NIGHTLY
Status: DISCONTINUED | OUTPATIENT
Start: 2019-08-08 | End: 2019-08-14 | Stop reason: HOSPADM

## 2019-08-08 RX ADMIN — HEPARIN SODIUM 5000 UNITS: 5000 INJECTION INTRAVENOUS; SUBCUTANEOUS at 13:55

## 2019-08-08 RX ADMIN — HEPARIN SODIUM 5000 UNITS: 5000 INJECTION INTRAVENOUS; SUBCUTANEOUS at 06:03

## 2019-08-08 RX ADMIN — SODIUM BICARBONATE 650 MG TABLET 1300 MG: at 22:55

## 2019-08-08 RX ADMIN — MICAFUNGIN SODIUM 100 MG: 20 INJECTION, POWDER, LYOPHILIZED, FOR SOLUTION INTRAVENOUS at 12:41

## 2019-08-08 RX ADMIN — INSULIN LISPRO 7 UNITS: 100 INJECTION, SOLUTION INTRAVENOUS; SUBCUTANEOUS at 12:43

## 2019-08-08 RX ADMIN — METRONIDAZOLE 500 MG: 500 INJECTION, SOLUTION INTRAVENOUS at 10:14

## 2019-08-08 RX ADMIN — INSULIN LISPRO 4 UNITS: 100 INJECTION, SOLUTION INTRAVENOUS; SUBCUTANEOUS at 10:22

## 2019-08-08 RX ADMIN — Medication 250 MG: at 22:55

## 2019-08-08 RX ADMIN — INSULIN DETEMIR 30 UNITS: 100 INJECTION, SOLUTION SUBCUTANEOUS at 23:03

## 2019-08-08 RX ADMIN — METOPROLOL TARTRATE 100 MG: 100 TABLET ORAL at 22:55

## 2019-08-08 RX ADMIN — Medication 250 MG: at 10:06

## 2019-08-08 RX ADMIN — TERAZOSIN HYDROCHLORIDE ANHYDROUS 5 MG: 5 CAPSULE ORAL at 22:55

## 2019-08-08 RX ADMIN — TAMSULOSIN HYDROCHLORIDE 0.8 MG: 0.4 CAPSULE ORAL at 22:56

## 2019-08-08 RX ADMIN — DULOXETINE HYDROCHLORIDE 30 MG: 30 CAPSULE, DELAYED RELEASE ORAL at 10:22

## 2019-08-08 RX ADMIN — AMLODIPINE BESYLATE 10 MG: 10 TABLET ORAL at 10:20

## 2019-08-08 RX ADMIN — SODIUM BICARBONATE 650 MG TABLET 1300 MG: at 10:09

## 2019-08-08 RX ADMIN — INSULIN DETEMIR 25 UNITS: 100 INJECTION, SOLUTION SUBCUTANEOUS at 10:15

## 2019-08-08 RX ADMIN — CEFTRIAXONE SODIUM 1 G: 1 INJECTION, POWDER, FOR SOLUTION INTRAMUSCULAR; INTRAVENOUS at 15:59

## 2019-08-08 RX ADMIN — METOPROLOL TARTRATE 100 MG: 100 TABLET ORAL at 10:06

## 2019-08-08 RX ADMIN — METRONIDAZOLE 500 MG: 500 INJECTION, SOLUTION INTRAVENOUS at 23:03

## 2019-08-08 RX ADMIN — INSULIN DETEMIR 25 UNITS: 100 INJECTION, SOLUTION SUBCUTANEOUS at 00:04

## 2019-08-08 RX ADMIN — METRONIDAZOLE 500 MG: 500 INJECTION, SOLUTION INTRAVENOUS at 16:00

## 2019-08-08 RX ADMIN — HYDROCODONE BITARTRATE AND ACETAMINOPHEN 1 TABLET: 10; 325 TABLET ORAL at 02:49

## 2019-08-08 RX ADMIN — HEPARIN SODIUM 5000 UNITS: 5000 INJECTION INTRAVENOUS; SUBCUTANEOUS at 22:56

## 2019-08-08 NOTE — PROGRESS NOTES
[facility] Medicine Services  PROGRESS NOTE    Patient Name: Kendrick Crystal  : 1970  MRN: [id number]    Date of Admission: 2019  Length of Stay: 8  Primary Care Physician: Keyanna Leone APRN    Subjective   Subjective     CC:  Abscess     HPI:  Mother present.  Sleeping a lot.  He denies any specific complaints.  UOP remains good.      Review of Systems  Gen- No fevers, chills  CV- No chest pain, palpitations  Resp- No cough, dyspnea  GI- No N/V/D, abd pain    Objective   Objective     Vital Signs:   Temp:  [97.1 °F (36.2 °C)-97.9 °F (36.6 °C)] 97.8 °F (36.6 °C)  Heart Rate:  [69-73] 72  Resp:  [18-20] 18  BP: (150-185)/() 163/94        Physical Exam:  GEN: NAD, resting in bed, awake, obese, a little drowsy but does stay awake with stimulation  HEENT: on room air, atraumatic, normocephalic  NECK: supple, no masses  RESP: on room air, normal effort  CV: RRR, no murmurs  PSYCH: flat affect, appropriate  NEURO: awake, alert, no focal deficits noted  MSK: no edema noted, left BKA  SKIN: no rashes noted   : childs in place with dilute yellow urine    Results Reviewed:  I have personally reviewed current lab, radiology, and data and agree.    Results from last 7 days   Lab Units 19  0624 19  0905 19  0451   WBC 10*3/mm3 12.48* 14.44* 13.41*   HEMOGLOBIN g/dL 10.8* 11.6* 11.1*   HEMATOCRIT % 34.7* 38.1 35.1*   PLATELETS 10*3/mm3 222 232 217     Results from last 7 days   Lab Units 19  0621 19  1109 19  0905  19  0855  19  0727   SODIUM mmol/L 137 137 136   < > 133*   < > 133*   POTASSIUM mmol/L 5.3* 5.4* 5.6*   < > 4.9   < > 4.8   CHLORIDE mmol/L 103 103 106   < > 99   < > 99   CO2 mmol/L 16.0* 16.0* 13.0*   < > 20.0*   < > 25.0   BUN mg/dL 52* 47* 48*   < > 28*   < > 14   CREATININE mg/dL 5.47* 4.57* 4.68*   < > 3.01*   < > 0.74*   GLUCOSE mg/dL 112* 158* 178*   < > 268*   < > 418*   CALCIUM mg/dL 7.8* 7.5* 8.0*   < > 7.9*    < > 8.3*   ALT (SGPT) U/L  --   --  20  --  26  --  24   AST (SGOT) U/L  --   --  17  --  23  --  21    < > = values in this interval not displayed.     Estimated Creatinine Clearance: 21.9 mL/min (A) (by C-G formula based on SCr of 5.47 mg/dL (H)).    Microbiology Results Abnormal     Procedure Component Value - Date/Time    Anaerobic Culture - Wound, Rectal Abscess [[id number]] Collected:  08/02/19 1620    Lab Status:  Final result Specimen:  Wound from Rectal Abscess Updated:  08/07/19 0914     Anaerobic Culture No anaerobes isolated at 5 days    Wound Culture - Wound, Rectal Abscess [[id number]]  (Abnormal)  (Susceptibility) Collected:  08/02/19 1620    Lab Status:  Final result Specimen:  Wound from Rectal Abscess Updated:  08/06/19 0922     Wound Culture Scant growth (1+) Escherichia coli      Scant growth (1+) Klebsiella pneumoniae ssp pneumoniae      Scant growth (1+) Normal Fecal Haily     Gram Stain Few (2+) WBCs seen      Occasional Yeast      Rare (1+) Gram negative bacilli    Narrative:       Specimen is an abscess - work up all GNRs per Dr. Dong ([facility] infection control).    Susceptibility      Escherichia coli     EYAD     Ampicillin Resistant     Ampicillin + Sulbactam Intermediate     Cefazolin Susceptible     Cefepime Susceptible     Ceftazidime Susceptible     Ceftriaxone Susceptible     Gentamicin Susceptible     Levofloxacin Resistant     Piperacillin + Tazobactam Susceptible     Tetracycline Susceptible     Trimethoprim + Sulfamethoxazole Susceptible                Susceptibility      Klebsiella pneumoniae ssp pneumoniae     EYAD     Ampicillin Resistant     Ampicillin + Sulbactam Susceptible     Cefazolin Susceptible     Cefepime Susceptible     Ceftazidime Susceptible     Ceftriaxone Susceptible     Gentamicin Susceptible     Levofloxacin Susceptible     Piperacillin + Tazobactam Susceptible     Tetracycline Susceptible     Trimethoprim + Sulfamethoxazole Susceptible                    Blood  Culture - Blood, Hand, Left [[id number]] Collected:  07/30/19 1845    Lab Status:  Final result Specimen:  Blood from Hand, Left Updated:  08/04/19 2145     Blood Culture No growth at 5 days    Blood Culture - Blood, Arm, Right [[id number]] Collected:  07/30/19 1840    Lab Status:  Final result Specimen:  Blood from Arm, Right Updated:  08/04/19 2145     Blood Culture No growth at 5 days    Urine Culture - Urine, Urine, Clean Catch [[id number]]  (Normal) Collected:  07/30/19 1725    Lab Status:  Final result Specimen:  Urine, Clean Catch Updated:  07/31/19 2019     Urine Culture No growth          Imaging Results (last 24 hours)     Procedure Component Value Units Date/Time    US Renal Limited [[id number]] Collected:  08/05/19 1722     Updated:  08/07/19 1702    Narrative:       EXAMINATION: US RENAL LIMITED-     INDICATION: Acute kidney injury.      TECHNIQUE: Ultrasound kidneys and urinary bladder.     COMPARISON: None.     FINDINGS: Right kidney measures 12.3 cm in length without evidence of  hydronephrosis, contour deforming mass or obvious calculi.     Left kidney measures 13.3 cm in length without evidence of  hydronephrosis, contour deforming mass or obvious calculi.     Urinary bladder is decompressed with Walker catheter in situ.       Impression:       No acute sonographic abnormality within the visualized  kidneys. No hydronephrosis. Walker catheter in situ with no gross  abnormality of the decompressed urinary bladder.      D:  08/05/2019  E:  08/06/2019     This report was finalized on 8/7/2019 4:59 PM by Dr. Jb Campos.             Results for orders placed during the hospital encounter of 05/18/19   Adult Transthoracic Echo Complete W/ Cont if Necessary Per Protocol    Narrative · Estimated EF = 60%.  · Mild mitral valve regurgitation is present  · Mild tricuspid valve regurgitation is present.  · Calculated right ventricular systolic pressure from tricuspid   regurgitation is 29 mmHg.          I have  reviewed the medications:  Scheduled Meds:    [START ON 8/8/2019] amLODIPine 10 mg Oral Q24H   ceftriaxone 1 g Intravenous Q24H   DULoxetine 30 mg Oral Daily   heparin (porcine) 5,000 Units Subcutaneous Q8H   insulin detemir 32 Units Subcutaneous Q12H   insulin lispro 0-9 Units Subcutaneous 4x Daily With Meals & Nightly   insulin lispro 6 Units Subcutaneous TID With Meals   metoprolol tartrate 100 mg Oral Q12H   metroNIDAZOLE 500 mg Intravenous Q8H   micafungin (MYCAMINE)  mg Intravenous Q24H   saccharomyces boulardii 250 mg Oral BID   sodium bicarbonate 1,300 mg Oral BID   tamsulosin 0.8 mg Oral Nightly     Continuous Infusions:     PRN Meds:.dextrose  •  dextrose  •  glucagon (human recombinant)  •  HYDROcodone-acetaminophen  •  HYDROmorphone  •  ondansetron **OR** ondansetron  •  sodium chloride      Assessment/Plan   Assessment / Plan     Active Hospital Problems    Diagnosis  POA   • **Sepsis (CMS/[facility]) [A41.9]  Yes   • Abscess [L02.91]  Yes   • UTI (urinary tract infection) [N39.0]  Yes   • Hyponatremia [E87.1]  Yes   • DM (diabetes mellitus) type II uncontrolled, periph vascular disorder (CMS/[facility]) [E11.51, E11.65]  Yes   • Peripheral vascular disease (CMS/[facility]) [I73.9]  Yes   • S/P BKA (below knee amputation), left (CMS/[facility]) [Z89.512]  Not Applicable   • JUAN (acute kidney injury) (CMS/[facility]) [N17.9]  Unknown   • History of MRSA infection [Z86.14]  Yes   • Type 2 diabetes mellitus with circulatory disorder and uncontrolled [E11.59]  Yes   • Essential hypertension [I10]  Yes   • Hyperlipemia [E78.5]  Yes   • DUNLAP Cirrhosis [K74.60]  Yes   • Non-compliance [Z91.14]  Not Applicable      Resolved Hospital Problems   No resolved problems to display.        Brief Hospital Course to date:  Kendrick Crystal is a 48 year old gentleman with uncontrolled diabetes who presents with sepsis secondary to abscess inferior to the prostate and anterior to the rectum.    Sepsis, claudia-rectal abscess, UTI   -pt met sepsis criteria  "based on elevated lactic acid, leukocytosis, tachycardia and positive source  -CT abd/pel obtained: \"3.0 x 3.2 cm fluid collection with enhancing margins consistent with an abscess related to the inferior aspect of the prostate gland and anterior to the distal rectum.\"  -abscess drainage done 8/2, cultures with e coli and klebsiella.  On 8/6  changed abx to rocephin and flagyl.  - WBC trending down now    JUAN  Metabolic acidosis  Hyperkalemia  -Cr continues to rise, up to 5.5 today  - renal following.  Multifactorial.  Walker in place with good UOP.  - s/p valtessa for K+, remains minimally elevated  - hopefully will improve, if not may need temporary dialysis.  D/w patient and mother again today.       Hyponatremia  - improved, stable     Uncontrolled T2DM, a1c>14%  -pt reports that he often \"forgets\" to take his routine medications; including his insulin and does not check his glucose daily  -blood sugar >600 on admission, but did not meet criteria for DKA, now improving with subQ insulin   - glc has been borderline low.  PO intake seems not as good.  Will decrease levemir and prandial doses and monitor.     HTN  -labile, monitor   -have stopped lisinopril given JUAN- monitor closely, norvasc increased today.     DVT prophylaxis:  scd- right leg , heparin given JUAN     Disposition: I expect the patient to be discharged home 4-5 days.    CODE STATUS:   Code Status and Medical Interventions:   Ordered at: 07/30/19 2051     Code Status:    CPR     Medical Interventions (Level of Support Prior to Arrest):    Full       Electronically signed by Shahbaz Valdivia MD, 08/07/19, 8:13 PM.      "

## 2019-08-08 NOTE — PROGRESS NOTES
"    [facility] Medicine Services  PROGRESS NOTE    Patient Name: Kendrick Crystal  : 1970  MRN: [id number]    Date of Admission: 2019  Length of Stay: 9  Primary Care Physician: Keyanna Leone APRN    Subjective   Subjective     CC:  Abscess     HPI:  Mother present.  No new issues.  Report of some \"white drainage\" around urethra.  He voices no complaints.      Review of Systems  Gen- No fevers, chills  CV- No chest pain, palpitations  Resp- No cough, dyspnea  GI- No N/V/D, abd pain    Objective   Objective     Vital Signs:   Temp:  [96.2 °F (35.7 °C)-97.9 °F (36.6 °C)] 97.9 °F (36.6 °C)  Heart Rate:  [71-98] 75  Resp:  [16-18] 18  BP: (158-179)/() 179/99        Physical Exam:  GEN: NAD, resting in bed, awake, obese, more alert and interactive today  HEENT: on room air, atraumatic, normocephalic  NECK: supple, no masses  RESP: on room air, normal effort  CV: RRR, no murmurs  PSYCH:appropriate  affect, appropriate  NEURO: awake, alert, no focal deficits noted  MSK: no edema noted, left BKA  SKIN: no rashes noted   : childs in place with dilute yellow urine.  No pus or drainage from urethra    Results Reviewed:  I have personally reviewed current lab, radiology, and data and agree.    Results from last 7 days   Lab Units 19  0738 19  0624 19  0905   WBC 10*3/mm3 13.61* 12.48* 14.44*   HEMOGLOBIN g/dL 10.9* 10.8* 11.6*   HEMATOCRIT % 35.1* 34.7* 38.1   PLATELETS 10*3/mm3 246 222 232     Results from last 7 days   Lab Units 19  0738 19  0621 19  1109 19  0905  19  0855  19  0727   SODIUM mmol/L 138 137 137 136   < > 133*   < > 133*   POTASSIUM mmol/L 5.2 5.3* 5.4* 5.6*   < > 4.9   < > 4.8   CHLORIDE mmol/L 104 103 103 106   < > 99   < > 99   CO2 mmol/L 17.0* 16.0* 16.0* 13.0*   < > 20.0*   < > 25.0   BUN mg/dL 61* 52* 47* 48*   < > 28*   < > 14   CREATININE mg/dL 4.94* 5.47* 4.57* 4.68*   < > 3.01*   < > 0.74*   GLUCOSE " mg/dL 234* 112* 158* 178*   < > 268*   < > 418*   CALCIUM mg/dL 8.1* 7.8* 7.5* 8.0*   < > 7.9*   < > 8.3*   ALT (SGPT) U/L  --   --   --  20  --  26  --  24   AST (SGOT) U/L  --   --   --  17  --  23  --  21    < > = values in this interval not displayed.     Estimated Creatinine Clearance: 24.3 mL/min (A) (by C-G formula based on SCr of 4.94 mg/dL (H)).    Microbiology Results Abnormal     Procedure Component Value - Date/Time    Anaerobic Culture - Wound, Rectal Abscess [[id number]] Collected:  08/02/19 1620    Lab Status:  Final result Specimen:  Wound from Rectal Abscess Updated:  08/07/19 0914     Anaerobic Culture No anaerobes isolated at 5 days    Wound Culture - Wound, Rectal Abscess [[id number]]  (Abnormal)  (Susceptibility) Collected:  08/02/19 1620    Lab Status:  Final result Specimen:  Wound from Rectal Abscess Updated:  08/06/19 0922     Wound Culture Scant growth (1+) Escherichia coli      Scant growth (1+) Klebsiella pneumoniae ssp pneumoniae      Scant growth (1+) Normal Fecal Haily     Gram Stain Few (2+) WBCs seen      Occasional Yeast      Rare (1+) Gram negative bacilli    Narrative:       Specimen is an abscess - work up all GNRs per Dr. Dong ([facility] infection control).    Susceptibility      Escherichia coli     EYAD     Ampicillin Resistant     Ampicillin + Sulbactam Intermediate     Cefazolin Susceptible     Cefepime Susceptible     Ceftazidime Susceptible     Ceftriaxone Susceptible     Gentamicin Susceptible     Levofloxacin Resistant     Piperacillin + Tazobactam Susceptible     Tetracycline Susceptible     Trimethoprim + Sulfamethoxazole Susceptible                Susceptibility      Klebsiella pneumoniae ssp pneumoniae     EYAD     Ampicillin Resistant     Ampicillin + Sulbactam Susceptible     Cefazolin Susceptible     Cefepime Susceptible     Ceftazidime Susceptible     Ceftriaxone Susceptible     Gentamicin Susceptible     Levofloxacin Susceptible     Piperacillin + Tazobactam  Susceptible     Tetracycline Susceptible     Trimethoprim + Sulfamethoxazole Susceptible                    Blood Culture - Blood, Hand, Left [[id number]] Collected:  07/30/19 1845    Lab Status:  Final result Specimen:  Blood from Hand, Left Updated:  08/04/19 2145     Blood Culture No growth at 5 days    Blood Culture - Blood, Arm, Right [[id number]] Collected:  07/30/19 1840    Lab Status:  Final result Specimen:  Blood from Arm, Right Updated:  08/04/19 2145     Blood Culture No growth at 5 days    Urine Culture - Urine, Urine, Clean Catch [[id number]]  (Normal) Collected:  07/30/19 1725    Lab Status:  Final result Specimen:  Urine, Clean Catch Updated:  07/31/19 2019     Urine Culture No growth          Imaging Results (last 24 hours)     Procedure Component Value Units Date/Time    US Renal Limited [[id number]] Collected:  08/05/19 1722     Updated:  08/07/19 1702    Narrative:       EXAMINATION: US RENAL LIMITED-     INDICATION: Acute kidney injury.      TECHNIQUE: Ultrasound kidneys and urinary bladder.     COMPARISON: None.     FINDINGS: Right kidney measures 12.3 cm in length without evidence of  hydronephrosis, contour deforming mass or obvious calculi.     Left kidney measures 13.3 cm in length without evidence of  hydronephrosis, contour deforming mass or obvious calculi.     Urinary bladder is decompressed with Walker catheter in situ.       Impression:       No acute sonographic abnormality within the visualized  kidneys. No hydronephrosis. Walker catheter in situ with no gross  abnormality of the decompressed urinary bladder.      D:  08/05/2019  E:  08/06/2019     This report was finalized on 8/7/2019 4:59 PM by Dr. Jb Campos.             Results for orders placed during the hospital encounter of 05/18/19   Adult Transthoracic Echo Complete W/ Cont if Necessary Per Protocol    Narrative · Estimated EF = 60%.  · Mild mitral valve regurgitation is present  · Mild tricuspid valve regurgitation is  present.  · Calculated right ventricular systolic pressure from tricuspid   regurgitation is 29 mmHg.          I have reviewed the medications:  Scheduled Meds:    amLODIPine 10 mg Oral Q24H   ceftriaxone 1 g Intravenous Q24H   DULoxetine 30 mg Oral Daily   heparin (porcine) 5,000 Units Subcutaneous Q8H   insulin detemir 25 Units Subcutaneous Q12H   insulin lispro 0-9 Units Subcutaneous 4x Daily With Meals & Nightly   insulin lispro 4 Units Subcutaneous TID With Meals   metoprolol tartrate 100 mg Oral Q12H   metroNIDAZOLE 500 mg Intravenous Q8H   micafungin (MYCAMINE)  mg Intravenous Q24H   saccharomyces boulardii 250 mg Oral BID   sodium bicarbonate 1,300 mg Oral BID   tamsulosin 0.8 mg Oral Nightly   terazosin 5 mg Oral Nightly     Continuous Infusions:     PRN Meds:.dextrose  •  dextrose  •  glucagon (human recombinant)  •  HYDROcodone-acetaminophen  •  ondansetron **OR** ondansetron  •  sodium chloride      Assessment/Plan   Assessment / Plan     Active Hospital Problems    Diagnosis  POA   • **Sepsis (CMS/[facility]) [A41.9]  Yes   • Abscess [L02.91]  Yes   • UTI (urinary tract infection) [N39.0]  Yes   • Hyponatremia [E87.1]  Yes   • DM (diabetes mellitus) type II uncontrolled, periph vascular disorder (CMS/[facility]) [E11.51, E11.65]  Yes   • Peripheral vascular disease (CMS/[facility]) [I73.9]  Yes   • S/P BKA (below knee amputation), left (CMS/[facility]) [Z89.512]  Not Applicable   • JUAN (acute kidney injury) (CMS/[facility]) [N17.9]  Unknown   • History of MRSA infection [Z86.14]  Yes   • Type 2 diabetes mellitus with circulatory disorder and uncontrolled [E11.59]  Yes   • Essential hypertension [I10]  Yes   • Hyperlipemia [E78.5]  Yes   • DUNLAP Cirrhosis [K74.60]  Yes   • Non-compliance [Z91.14]  Not Applicable      Resolved Hospital Problems   No resolved problems to display.        Brief Hospital Course to date:  Kendrick Crystal is a 48 year old gentleman with uncontrolled diabetes who presents with sepsis secondary to abscess  "inferior to the prostate and anterior to the rectum.    Sepsis, claudia-rectal abscess, UTI   -pt met sepsis criteria based on elevated lactic acid, leukocytosis, tachycardia and positive source  -CT abd/pel obtained: \"3.0 x 3.2 cm fluid collection with enhancing margins consistent with an abscess related to the inferior aspect of the prostate gland and anterior to the distal rectum.\"  -abscess drainage done , cultures with e coli and klebsiella.  On   changed abx to rocephin and flagyl.  Micamine scheduled to  today, defer to ID whether to renew.  - WBC stable around 12-14  - also, renal ordered U/A and culture today.  I see no indication and no drainage.  Pull childs and monitor, will d/c U/A.  D/w RN.    JUAN  Metabolic acidosis  Hyperkalemia  -Cr is now trending down, no plans for dialysis  - renal following.  Multifactorial.  Childs in place with good UOP.  - s/p valtessa for K+, remains minimally elevated       Hyponatremia  - improved, stable     Uncontrolled T2DM, a1c>14%  -pt reports that he often \"forgets\" to take his routine medications; including his insulin and does not check his glucose daily  -blood sugar >600 on admission, but did not meet criteria for DKA, now improving with subQ insulin   - glc now trending back up.  Will increase insulin today and monitor daily for needs     HTN  -labile, monitor   -have stopped lisinopril given JUAN- monitor closely, norvasc increased      DVT prophylaxis:  scd- right leg , heparin       Disposition: I expect the patient to be discharged home 4-5 days.    CODE STATUS:   Code Status and Medical Interventions:   Ordered at: 19     Code Status:    CPR     Medical Interventions (Level of Support Prior to Arrest):    Full       Electronically signed by Shahbaz Valdivia MD, 19, 12:40 PM.      "

## 2019-08-08 NOTE — PROGRESS NOTES
Kendrick Crystal     LOS: 9 days     Subjective   Patient sitting up passing urine.  Feels better.  Walker catheter out because of some discharge from the end of the penis.  No pneumaturia.  White blood cell count 13.6.  Creatinine 4.94      Objective     Vital Signs  Vitals:    08/08/19 1800   BP:    Pulse: 84   Resp:    Temp:    SpO2:        I/O  Intake & Output (last day)       08/07 0701 - 08/08 0700 08/08 0701 - 08/09 0700    P.O.  240    IV Piggyback  100    Total Intake(mL/kg)  340 (3)    Urine (mL/kg/hr) 2550 (0.9) 1700 (1.3)    Stool 0 0    Total Output 2550 1700    Net -2550 -1360          Stool Unmeasured Occurrence 1 x 1 x          Physical Exam:  Abdomen soft and nontender.         Results Review:       WBC WBC   Date Value Ref Range Status   08/08/2019 13.61 (H) 3.40 - 10.80 10*3/mm3 Final   08/07/2019 12.48 (H) 3.40 - 10.80 10*3/mm3 Final   08/06/2019 14.44 (H) 3.40 - 10.80 10*3/mm3 Final      HGB Hemoglobin   Date Value Ref Range Status   08/08/2019 10.9 (L) 13.0 - 17.7 g/dL Final   08/07/2019 10.8 (L) 13.0 - 17.7 g/dL Final   08/06/2019 11.6 (L) 13.0 - 17.7 g/dL Final      HCT Hematocrit   Date Value Ref Range Status   08/08/2019 35.1 (L) 37.5 - 51.0 % Final   08/07/2019 34.7 (L) 37.5 - 51.0 % Final   08/06/2019 38.1 37.5 - 51.0 % Final      PLT        Results from last 7 days   Lab Units 08/08/19  0738   PLATELETS 10*3/mm3 246            Sodium Sodium   Date Value Ref Range Status   08/08/2019 138 136 - 145 mmol/L Final   08/07/2019 137 136 - 145 mmol/L Final   08/06/2019 137 136 - 145 mmol/L Final   08/06/2019 136 136 - 145 mmol/L Final      Potassium Potassium   Date Value Ref Range Status   08/08/2019 5.2 3.5 - 5.2 mmol/L Final   08/07/2019 5.3 (H) 3.5 - 5.2 mmol/L Final   08/06/2019 5.4 (H) 3.5 - 5.2 mmol/L Final   08/06/2019 5.6 (H) 3.5 - 5.2 mmol/L Final      Chloride Chloride   Date Value Ref Range Status   08/08/2019 104 98 - 107 mmol/L Final   08/07/2019 103 98 - 107 mmol/L Final   08/06/2019  103 98 - 107 mmol/L Final   08/06/2019 106 98 - 107 mmol/L Final      Bicarbonate No results found for: PLASMABICARB   BUN BUN   Date Value Ref Range Status   08/08/2019 61 (H) 6 - 20 mg/dL Final   08/07/2019 52 (H) 6 - 20 mg/dL Final   08/06/2019 47 (H) 6 - 20 mg/dL Final   08/06/2019 48 (H) 6 - 20 mg/dL Final      Creatinine Creatinine   Date Value Ref Range Status   08/08/2019 4.94 (H) 0.76 - 1.27 mg/dL Final   08/07/2019 5.47 (H) 0.76 - 1.27 mg/dL Final   08/06/2019 4.57 (H) 0.76 - 1.27 mg/dL Final   08/06/2019 4.68 (H) 0.76 - 1.27 mg/dL Final      Calcium Calcium   Date Value Ref Range Status   08/08/2019 8.1 (L) 8.6 - 10.5 mg/dL Final   08/07/2019 7.8 (L) 8.6 - 10.5 mg/dL Final   08/06/2019 7.5 (L) 8.6 - 10.5 mg/dL Final   08/06/2019 8.0 (L) 8.6 - 10.5 mg/dL Final      Assessment/Plan       Sepsis (CMS/HCC)    DUNLAP Cirrhosis    Essential hypertension    Non-compliance    Hyperlipemia    Type 2 diabetes mellitus with circulatory disorder and uncontrolled    History of MRSA infection    JUAN (acute kidney injury) (CMS/[facility])    S/P BKA (below knee amputation), left (CMS/HCC)    Peripheral vascular disease (CMS/HCC)    DM (diabetes mellitus) type II uncontrolled, periph vascular disorder (CMS/HCC)    Abscess    UTI (urinary tract infection)    Hyponatremia      Status post transrectal drainage of perirectal abscess.  Patient still has low-grade elevated white blood cell count.  Clinically he appears to be improving.  Still with significant elevation of his creatinine but hopefully may be improving.  Will continue to follow but do not see reason for any further surgical intervention at this time.    Mumtaz Payne MD  08/08/19  6:26 PM

## 2019-08-08 NOTE — PROGRESS NOTES
"NAL Progress Note  Kendrick Crystal  [id number]  1970 7/30/2019    Reason for visit:  JUAN    No shortness of breath he feels better making more urine and his creatinine finally starting to come down    ROS:    Pulm:  No SOA  CV:  No CP    I&O:    Intake/Output Summary (Last 24 hours) at 8/8/2019 1113  Last data filed at 8/8/2019 1020  Gross per 24 hour   Intake 100 ml   Output 3250 ml   Net -3150 ml       PE:   Blood pressure 158/96, pulse 74, temperature 96.2 °F (35.7 °C), temperature source Axillary, resp. rate 18, height 190.5 cm (75\"), weight 113 kg (250 lb), SpO2 96 %.    GENERAL: Awake and alert, in no acute distress.   HEENT: PER, No conjunctivitis  NECK: Supple, no JVD     HEART: RRR; No murmur, rubs, gallops.   LUNGS: Clear to auscultation bilaterally without wheezing, rales, rhonchi. Normal respiratory effort. Nonlabored. No dullness.  ABDOMEN: Soft, nontender, nondistended. Positive bowel sounds. No rebound or guarding. NO mass or HSM.  EXT:  No cyanosis, clubbing, + edema. No cord.  : Genitalia generally unremarkable.  Without Walker catheter.  SKIN: Warm and dry without cutaneous eruptions on Inspection/palpation.    NEURO: Alert and oriented x 3, Motor 5/5 bilaterally    Medications:    Current Facility-Administered Medications:   •  amLODIPine (NORVASC) tablet 10 mg, 10 mg, Oral, Q24H, Jaime Rubio MD, 10 mg at 08/08/19 1020  •  cefTRIAXone (ROCEPHIN) 1 g/100 mL 0.9% NS (MBP), 1 g, Intravenous, Q24H, Shahbaz Valdivia MD, 1 g at 08/07/19 1712  •  dextrose (D50W) 25 g/ 50mL Intravenous Solution 25 g, 25 g, Intravenous, Q15 Min PRN, Keyanna Aponte APRN  •  dextrose (GLUTOSE) oral gel 15 g, 15 g, Oral, Q15 Min PRN, Keyanna Aponte APRN  •  DULoxetine (CYMBALTA) DR capsule 30 mg, 30 mg, Oral, Daily, Keyanna Aponte APRN, 30 mg at 08/08/19 1022  •  glucagon (human recombinant) (GLUCAGEN DIAGNOSTIC) injection 1 mg, 1 mg, Subcutaneous, PRN, Keyanna Aponte APRN  •  heparin (porcine) 5000 UNIT/ML " injection 5,000 Units, 5,000 Units, Subcutaneous, Q8H, Brenda Gonzales MD, 5,000 Units at 08/08/19 0603  •  HYDROcodone-acetaminophen (NORCO)  MG per tablet 1 tablet, 1 tablet, Oral, Q6H PRN, Keyanna Aponte APRN, 1 tablet at 08/08/19 0249  •  HYDROmorphone (DILAUDID) injection 1 mg, 1 mg, Intravenous, Q3H PRN, Keyanna Aponte APRN  •  insulin detemir (LEVEMIR) injection 25 Units, 25 Units, Subcutaneous, Q12H, Shahbaz Valdivia MD, 25 Units at 08/08/19 1015  •  insulin lispro (humaLOG) injection 0-9 Units, 0-9 Units, Subcutaneous, 4x Daily With Meals & Nightly, Keyanna Aponte APRN, 4 Units at 08/08/19 1022  •  insulin lispro (humaLOG) injection 4 Units, 4 Units, Subcutaneous, TID With Meals, Shahbaz Valdivia MD, 4 Units at 08/08/19 1017  •  metoprolol tartrate (LOPRESSOR) tablet 100 mg, 100 mg, Oral, Q12H, Jaime Rubio MD, 100 mg at 08/08/19 1006  •  metroNIDAZOLE (FLAGYL) 500 mg/100mL IVPB, 500 mg, Intravenous, Q8H, Shahbaz Valdivia MD, 500 mg at 08/08/19 1014  •  micafungin 100 mg/100 mL 0.9% NS IVPB (mbp), 100 mg, Intravenous, Q24H, Usman Dong MD, 100 mg at 08/07/19 1135  •  ondansetron (ZOFRAN) tablet 4 mg, 4 mg, Oral, Q6H PRN **OR** ondansetron (ZOFRAN) injection 4 mg, 4 mg, Intravenous, Q6H PRN, Keyanna Aponte APRN, 4 mg at 08/05/19 0654  •  saccharomyces boulardii (FLORASTOR) capsule 250 mg, 250 mg, Oral, BID, Keyanna Aponte APRN, 250 mg at 08/08/19 1006  •  sodium bicarbonate tablet 1,300 mg, 1,300 mg, Oral, BID, Jaime Rubio MD, 1,300 mg at 08/08/19 1009  •  sodium chloride 0.9 % flush 10 mL, 10 mL, Intravenous, PRN, Kendrick Sotelo MD, 10 mL at 08/07/19 2352  •  tamsulosin (FLOMAX) 24 hr capsule 0.8 mg, 0.8 mg, Oral, Nightly, Keyanna Aponte APRN, 0.8 mg at 08/07/19 2351    Meds reviewed and doses adjusted for renal function    Labs:  Results from last 7 days   Lab Units 08/08/19  0738 08/07/19  0624 08/07/19  0621 08/06/19  1109 08/06/19  0905 08/05/19  0451 08/04/19  0855  08/03/19  0829 08/02/19  0728   WBC 10*3/mm3 13.61* 12.48*  --   --  14.44* 13.41* 12.34* 16.57* 12.17*   HEMOGLOBIN g/dL 10.9* 10.8*  --   --  11.6* 11.1* 11.2* 11.1* 11.5*   HEMATOCRIT % 35.1* 34.7*  --   --  38.1 35.1* 36.5* 34.9* 36.0*   PLATELETS 10*3/mm3 246 222  --   --  232 217 216 230 223   SODIUM mmol/L 138  --  137 137 136 132* 133* 136  --    POTASSIUM mmol/L 5.2  --  5.3* 5.4* 5.6* 5.7* 4.9 4.9  --    CHLORIDE mmol/L 104  --  103 103 106 102 99 101  --    CO2 mmol/L 17.0*  --  16.0* 16.0* 13.0* 17.0* 20.0* 24.0  --    BUN mg/dL 61*  --  52* 47* 48* 39* 28* 18  --    CREATININE mg/dL 4.94*  --  5.47* 4.57* 4.68* 3.64* 3.01* 1.52*  --    GLUCOSE mg/dL 234*  --  112* 158* 178* 190* 268* 247*  --    CALCIUM mg/dL 8.1*  --  7.8* 7.5* 8.0* 7.9* 7.9* 7.9*  --    URIC ACID mg/dL  --   --   --   --   --   --  5.0  --   --              Radiology:  Imaging Results (last 72 hours)     ** No results found for the last 72 hours. **          Renal Imaging:  Reviewed  No radiology results for the last day        IMPRESSION:  Acute kidney injury-likely related to ATN there is a high likelihood that this is related to vancomycin toxicity history of vancomycin level was quite high but this is also a multifactorial issue as his blood pressure was relatively low in the last 48 hours in addition he was taking lisinopril  Worsening renal function  Chronic kidney disease he does have recently started having protein in his urine  Hypertension  Poorly controlled diabetes  Peripheral vascular disease  Hyperkalemia    RECOMMENDATIONS:  Creatinine finally started to decline discontinue n.p.o.  Continue blood pressure medications  No diuretics  Labs in a.m.  No dialysis at this time     Please note that portions of this note were completed with a voice recognition program efforts were made to add at the dictations but words may be mistranscribed.    Jaime Rubio MD  8/8/2019  11:13 AM

## 2019-08-08 NOTE — PLAN OF CARE
Problem: Patient Care Overview  Goal: Plan of Care Review  Outcome: Ongoing (interventions implemented as appropriate)   08/05/19 1743 08/07/19 2000 08/08/19 1848   Coping/Psychosocial   Plan of Care Reviewed With --  patient;spouse --    Plan of Care Review   Progress no change --  --    OTHER   Outcome Summary --  --  pt cath removed at 2pm. has voided 150 but has volume of 475 ml left. refuses straight cath at this pt, will try scheduled meds tonight and continuje to drink water to try to urinate. VSS      08/05/19 1743 08/07/19 2000 08/08/19 1848   Coping/Psychosocial   Plan of Care Reviewed With --  patient;spouse --    Plan of Care Review   Progress no change --  --    OTHER   Outcome Summary --  --  pt cath removed at 2pm. has voided 150 but has volume of 475 ml left. refuses straight cath at this pt, will try scheduled meds tonight and continuje to drink water to try to urinate. VSS     Goal: Individualization and Mutuality  Outcome: Ongoing (interventions implemented as appropriate)    Goal: Discharge Needs Assessment  Outcome: Ongoing (interventions implemented as appropriate)   07/31/19 0758   Discharge Needs Assessment   Readmission Within the Last 30 Days no previous admission in last 30 days   Concerns to be Addressed denies needs/concerns at this time   Patient/Family Anticipates Transition to home with family   Patient/Family Anticipated Services at Transition home health care   Transportation Concerns car, none   Transportation Anticipated family or friend will provide   Anticipated Changes Related to Illness none   Equipment Needed After Discharge wheelchair, manual   Outpatient/Agency/Support Group Needs homecare agency   Offered/Gave Vendor List no   Disability   Equipment Currently Used at Home lift device;wheelchair, motorized;prosthesis;ramp;shower chair     Goal: Interprofessional Rounds/Family Conf  Outcome: Ongoing (interventions implemented as appropriate)      Problem: Skin Injury Risk  (Adult)  Intervention: Promote/Optimize Nutrition   08/08/19 1848   Nutrition Interventions   Oral Nutrition Promotion nutritional therapy counseling provided;rest periods promoted     Intervention: Prevent/Manage Excess Moisture   08/08/19 0900 08/08/19 1600   Hygiene Care   Perineal Care catheter care provided --    Bathing/Skin Care bath, complete;back care;bath, chlorhexidine;dressed/undressed;linen changed --    Skin Interventions   Skin Protection --  drying agents applied;adhesive use limited;tubing/devices free from skin contact;incontinence pads utilized     Intervention: Maintain Head of Bed Elevation Less Than 30 Degrees as Tolerated   08/08/19 1700   Positioning   Head of Bed (HOB) HOB elevated     Intervention: Prevent/Minimize Shear/Friction Injuries   08/08/19 1600 08/08/19 1821   Skin Interventions   Pressure Reduction Devices specialty bed utilized --    Positioning   Positioning/Transfer Devices --  in use;pillows     Intervention: Prevent or Minimize Pressure   08/08/19 1600 08/08/19 1700   Skin Interventions   Pressure Reduction Techniques frequent weight shift encouraged;weight shift assistance provided --    Positioning   Body Position --  supine       Goal: Identify Related Risk Factors and Signs and Symptoms  Outcome: Ongoing (interventions implemented as appropriate)   08/05/19 1743   Skin Injury Risk (Adult)   Related Risk Factors (Skin Injury Risk) body weight extremes;edema;infection;mobility impaired;moisture;nutritional deficiencies;tissue perfusion altered     Goal: Skin Health and Integrity  Outcome: Ongoing (interventions implemented as appropriate)   08/01/19 0450   Skin Injury Risk (Adult)   Skin Health and Integrity making progress toward outcome       Problem: Fall Risk (Adult)  Intervention: Monitor/Assist with Self Care   08/07/19 2000 08/08/19 1821   Activity   Activity Assistance Provided --  assistance, 2 people   Daily Care Interventions   Self-Care Promotion independence  encouraged;BADL personal objects within reach --      Intervention: Reduce Risk/Promote Restraint Free Environment   19   Safety Management   Environmental Safety Modification assistive device/personal items within reach;clutter free environment maintained;room organization consistent   Safety Promotion/Fall Prevention activity supervised;fall prevention program maintained;nonskid shoes/slippers when out of bed;safety round/check completed   Prevent  Drop/Fall   Safety/Security Measures family to remain at bedside     Intervention: Review Medications/Identify Contributors to Fall Risk   19   Safety Management   Medication Review/Management medications reviewed;high risk medications identified       Goal: Identify Related Risk Factors and Signs and Symptoms  Outcome: Ongoing (interventions implemented as appropriate)   19 1743   Fall Risk (Adult)   Related Risk Factors (Fall Risk) depression/anxiety;gait/mobility problems;sleep pattern alteration;environment unfamiliar   Signs and Symptoms (Fall Risk) presence of risk factors     Goal: Absence of Fall  Outcome: Ongoing (interventions implemented as appropriate)   19 0407   Fall Risk (Adult)   Absence of Fall making progress toward outcome       Problem: Pain, Chronic (Adult)  Intervention: Manage Persistent Pain Analgesia   19   Promote Effective Bowel Elimination   Bowel Intervention adequate fluid intake promoted;commode/bedpan at bedside --    Safety Management   Medication Review/Management --  medications reviewed;high risk medications identified     Intervention: Support Psychosocial Response to Persistent Pain   19   Coping/Psychosocial Interventions   Supportive Measures active listening utilized;positive reinforcement provided;relaxation techniques promoted;self-care encouraged;verbalization of feelings encouraged   Psychosocial Support   Diversional Activities television;smartphone      Intervention: Mutually Develop/Implement Persistent Pain Management Plan   08/06/19 2128 08/07/19 2000   Promote Oxygenation/Perfusion   Pain Management Interventions pain management plan reviewed with patient/caregiver;see MAR --    Cognitive Interventions   Sensory Stimulation Regulation --  care clustered;lighting decreased;quiet environment promoted       Goal: Identify Related Risk Factors and Signs and Symptoms  Outcome: Ongoing (interventions implemented as appropriate)    Goal: Acceptable Pain/Comfort Level and Functional Ability  Outcome: Ongoing (interventions implemented as appropriate)   07/31/19 0407   Pain, Chronic (Adult)   Acceptable Pain/Comfort Level and Functional Ability making progress toward outcome       Problem: Sepsis/Septic Shock (Adult)  Intervention: Promote Rest/Minimize Oxygen Consumption   08/07/19 2000 08/08/19 1821   Activity   Activity Management --  activity adjusted per tolerance   Coping/Psychosocial Interventions   Environmental Support calm environment promoted;rest periods encouraged --      Intervention: Provide Oxygenation/Ventilation/Perfusion Support   08/08/19 1700 08/08/19 1821   Activity   Activity Management --  activity adjusted per tolerance   Positioning   Head of Bed (HOB) HOB elevated --      Intervention: Support Psychosocial Response to Life-changing Event/Hospitalization   08/07/19 2000   Coping/Psychosocial Interventions   Supportive Measures active listening utilized;positive reinforcement provided;relaxation techniques promoted;self-care encouraged;verbalization of feelings encouraged   Psychosocial Support   Family/Support System Care self-care encouraged;support provided     Intervention: Prevent Infection Progression   08/08/19 1200 08/08/19 1821   Prevent/Manage Colorectal Surgical Infection   Fever Reduction/Comfort Measures lightweight bedding;lightweight clothing --    Safety Management   Infection Prevention --  environmental surveillance  performed;single patient room provided;rest/sleep promoted   Promote Perineal Hygiene/Elimination Safety   Isolation Precautions --  protective environment maintained     Intervention: Monitor/Manage Perfusion   08/08/19 1821   Safety Management   Medication Review/Management medications reviewed;high risk medications identified     Intervention: Prevent/Manage DVT/VTE Risk   08/07/19 2000   Interventions   VTE Prevention/Management other (see comments)  (HEPARIN)     Intervention: Manage Bleeding Risk   08/08/19 1400   Safety Interventions   Bleeding Management movement restricted   Bleeding Precautions blood pressure closely monitored;monitored for signs of bleeding     Intervention: Provide Initial Aggressive Fluid Resuscitation/Correction of Imbalance   08/07/19 2000   Nutrition Interventions   Fluid/Electrolyte Management fluids provided     Intervention: Optimize Glycemic Control   08/07/19 2000   Nutrition Interventions   Glycemic Management blood glucose monitoring;oral hydration promoted       Goal: Signs and Symptoms of Listed Potential Problems Will be Absent, Minimized or Managed (Sepsis/Septic Shock)  Outcome: Ongoing (interventions implemented as appropriate)   08/05/19 8193   Goal/Outcome Evaluation   Problems Assessed (Sepsis) glycemic control impaired;infection progression;situational response   Problems Present (Sepsis) glycemic control impaired;infection progression;situational response       Problem: Diabetes, Type 2 (Adult)  Intervention: Support/Optimize Psychosocial Response to Condition   08/07/19 2000   Coping/Psychosocial Interventions   Supportive Measures active listening utilized;positive reinforcement provided;relaxation techniques promoted;self-care encouraged;verbalization of feelings encouraged   Psychosocial Support   Family/Support System Care self-care encouraged;support provided     Intervention: Optimize Glycemic Control   08/07/19 2000   Nutrition Interventions   Glycemic  Management blood glucose monitoring;oral hydration promoted       Goal: Signs and Symptoms of Listed Potential Problems Will be Absent, Minimized or Managed (Diabetes, Type 2)  Outcome: Ongoing (interventions implemented as appropriate)   08/05/19 8143   Goal/Outcome Evaluation   Problems Assessed (Type 2 Diabetes) hyperglycemia;situational response   Problems Present (Type 2 Diabetes) hyperglycemia;situational response       Problem: Pain, Acute (Adult)  Intervention: Monitor and Manage Analgesia   08/07/19 2000 08/08/19 1821   Promote Effective Bowel Elimination   Bowel Intervention adequate fluid intake promoted;commode/bedpan at bedside --    Safety Management   Medication Review/Management --  medications reviewed;high risk medications identified     Intervention: Mutually Develop/Implement Acute Pain Management Plan   08/06/19 2128 08/07/19 2000   Promote Oxygenation/Perfusion   Pain Management Interventions pain management plan reviewed with patient/caregiver;see MAR --    Cognitive Interventions   Sensory Stimulation Regulation --  care clustered;lighting decreased;quiet environment promoted     Intervention: Support/Optimize Psychosocial Response to Acute Pain   08/07/19 2000   Coping/Psychosocial Interventions   Supportive Measures active listening utilized;positive reinforcement provided;relaxation techniques promoted;self-care encouraged;verbalization of feelings encouraged   Interventions   Trust Relationship/Rapport care explained;choices provided;questions answered;questions encouraged;reassurance provided;thoughts/feelings acknowledged   Psychosocial Support   Diversional Activities television;smartphone   Family/Support System Care self-care encouraged;support provided       Goal: Identify Related Risk Factors and Signs and Symptoms  Outcome: Ongoing (interventions implemented as appropriate)    Goal: Acceptable Pain Control/Comfort Level  Outcome: Ongoing (interventions implemented as  appropriate)   08/08/19 7468   Pain, Acute (Adult)   Acceptable Pain Control/Comfort Level making progress toward outcome

## 2019-08-08 NOTE — PROGRESS NOTES
Continued Stay Note  [facility]     Patient Name: Kendrick Crystal  MRN: [id number]  Today's Date: 8/8/2019    Admit Date: 7/30/2019    Discharge Plan     Row Name 08/08/19 0949       Plan    Plan  Home / [facility]    Patient/Family in Agreement with Plan  yes    Plan Comments  Spoke with patient at bedside. Denies any needs at this time. CM will continue to follow and arrange home health and dialysis, if needed.     Final Discharge Disposition Code  01 - home or self-care        Discharge Codes    No documentation.       Expected Discharge Date and Time     Expected Discharge Date Expected Discharge Time    Aug 13, 2019             Fadi Valentine RN

## 2019-08-09 LAB
ANION GAP SERPL CALCULATED.3IONS-SCNC: 17 MMOL/L (ref 5–15)
BASOPHILS # BLD AUTO: 0.07 10*3/MM3 (ref 0–0.2)
BASOPHILS NFR BLD AUTO: 0.5 % (ref 0–1.5)
BUN BLD-MCNC: 63 MG/DL (ref 6–20)
BUN/CREAT SERPL: 11.9 (ref 7–25)
CALCIUM SPEC-SCNC: 7.7 MG/DL (ref 8.6–10.5)
CHLORIDE SERPL-SCNC: 104 MMOL/L (ref 98–107)
CO2 SERPL-SCNC: 17 MMOL/L (ref 22–29)
CREAT BLD-MCNC: 5.29 MG/DL (ref 0.76–1.27)
DEPRECATED RDW RBC AUTO: 51.7 FL (ref 37–54)
EOSINOPHIL # BLD AUTO: 0.47 10*3/MM3 (ref 0–0.4)
EOSINOPHIL NFR BLD AUTO: 3.3 % (ref 0.3–6.2)
ERYTHROCYTE [DISTWIDTH] IN BLOOD BY AUTOMATED COUNT: 16.5 % (ref 12.3–15.4)
GFR SERPL CREATININE-BSD FRML MDRD: 12 ML/MIN/1.73
GFR SERPL CREATININE-BSD FRML MDRD: ABNORMAL ML/MIN/1.73
GLUCOSE BLD-MCNC: 115 MG/DL (ref 65–99)
GLUCOSE BLDC GLUCOMTR-MCNC: 132 MG/DL (ref 70–130)
GLUCOSE BLDC GLUCOMTR-MCNC: 153 MG/DL (ref 70–130)
GLUCOSE BLDC GLUCOMTR-MCNC: 163 MG/DL (ref 70–130)
GLUCOSE BLDC GLUCOMTR-MCNC: 90 MG/DL (ref 70–130)
HCT VFR BLD AUTO: 34.1 % (ref 37.5–51)
HGB BLD-MCNC: 10.6 G/DL (ref 13–17.7)
IMM GRANULOCYTES # BLD AUTO: 0.14 10*3/MM3 (ref 0–0.05)
IMM GRANULOCYTES NFR BLD AUTO: 1 % (ref 0–0.5)
LYMPHOCYTES # BLD AUTO: 1.55 10*3/MM3 (ref 0.7–3.1)
LYMPHOCYTES NFR BLD AUTO: 11 % (ref 19.6–45.3)
MCH RBC QN AUTO: 27 PG (ref 26.6–33)
MCHC RBC AUTO-ENTMCNC: 31.1 G/DL (ref 31.5–35.7)
MCV RBC AUTO: 86.8 FL (ref 79–97)
MONOCYTES # BLD AUTO: 0.89 10*3/MM3 (ref 0.1–0.9)
MONOCYTES NFR BLD AUTO: 6.3 % (ref 5–12)
NEUTROPHILS # BLD AUTO: 11.01 10*3/MM3 (ref 1.7–7)
NEUTROPHILS NFR BLD AUTO: 77.9 % (ref 42.7–76)
NRBC BLD AUTO-RTO: 0 /100 WBC (ref 0–0.2)
PLATELET # BLD AUTO: 258 10*3/MM3 (ref 140–450)
PMV BLD AUTO: 11.2 FL (ref 6–12)
POTASSIUM BLD-SCNC: 5 MMOL/L (ref 3.5–5.2)
RBC # BLD AUTO: 3.93 10*6/MM3 (ref 4.14–5.8)
SODIUM BLD-SCNC: 138 MMOL/L (ref 136–145)
WBC NRBC COR # BLD: 14.13 10*3/MM3 (ref 3.4–10.8)

## 2019-08-09 PROCEDURE — 63710000001 INSULIN DETEMIR PER 5 UNITS: Performed by: INTERNAL MEDICINE

## 2019-08-09 PROCEDURE — 25010000002 HEPARIN (PORCINE) PER 1000 UNITS: Performed by: INTERNAL MEDICINE

## 2019-08-09 PROCEDURE — 85025 COMPLETE CBC W/AUTO DIFF WBC: CPT | Performed by: COLON & RECTAL SURGERY

## 2019-08-09 PROCEDURE — 80048 BASIC METABOLIC PNL TOTAL CA: CPT | Performed by: INTERNAL MEDICINE

## 2019-08-09 PROCEDURE — 25010000002 CEFTRIAXONE PER 250 MG: Performed by: INTERNAL MEDICINE

## 2019-08-09 PROCEDURE — 99232 SBSQ HOSP IP/OBS MODERATE 35: CPT | Performed by: INTERNAL MEDICINE

## 2019-08-09 PROCEDURE — 82962 GLUCOSE BLOOD TEST: CPT

## 2019-08-09 RX ADMIN — HEPARIN SODIUM 5000 UNITS: 5000 INJECTION INTRAVENOUS; SUBCUTANEOUS at 21:21

## 2019-08-09 RX ADMIN — METRONIDAZOLE 500 MG: 500 INJECTION, SOLUTION INTRAVENOUS at 17:33

## 2019-08-09 RX ADMIN — CLOTRIMAZOLE 10 MG: 10 LOZENGE ORAL; TOPICAL at 16:16

## 2019-08-09 RX ADMIN — METOPROLOL TARTRATE 100 MG: 100 TABLET ORAL at 21:20

## 2019-08-09 RX ADMIN — DULOXETINE HYDROCHLORIDE 30 MG: 30 CAPSULE, DELAYED RELEASE ORAL at 08:25

## 2019-08-09 RX ADMIN — AMLODIPINE BESYLATE 10 MG: 10 TABLET ORAL at 08:25

## 2019-08-09 RX ADMIN — CEFTRIAXONE SODIUM 1 G: 1 INJECTION, POWDER, FOR SOLUTION INTRAMUSCULAR; INTRAVENOUS at 16:16

## 2019-08-09 RX ADMIN — METOPROLOL TARTRATE 100 MG: 100 TABLET ORAL at 08:25

## 2019-08-09 RX ADMIN — TAMSULOSIN HYDROCHLORIDE 0.8 MG: 0.4 CAPSULE ORAL at 21:20

## 2019-08-09 RX ADMIN — MICAFUNGIN SODIUM 100 MG: 20 INJECTION, POWDER, LYOPHILIZED, FOR SOLUTION INTRAVENOUS at 12:28

## 2019-08-09 RX ADMIN — HYDROCODONE BITARTRATE AND ACETAMINOPHEN 1 TABLET: 10; 325 TABLET ORAL at 21:20

## 2019-08-09 RX ADMIN — SODIUM BICARBONATE 650 MG TABLET 1300 MG: at 08:25

## 2019-08-09 RX ADMIN — HEPARIN SODIUM 5000 UNITS: 5000 INJECTION INTRAVENOUS; SUBCUTANEOUS at 06:20

## 2019-08-09 RX ADMIN — HEPARIN SODIUM 5000 UNITS: 5000 INJECTION INTRAVENOUS; SUBCUTANEOUS at 12:27

## 2019-08-09 RX ADMIN — TERAZOSIN HYDROCHLORIDE ANHYDROUS 5 MG: 5 CAPSULE ORAL at 21:21

## 2019-08-09 RX ADMIN — Medication 250 MG: at 21:20

## 2019-08-09 RX ADMIN — CLOTRIMAZOLE 10 MG: 10 LOZENGE ORAL; TOPICAL at 21:22

## 2019-08-09 RX ADMIN — INSULIN DETEMIR 30 UNITS: 100 INJECTION, SOLUTION SUBCUTANEOUS at 21:42

## 2019-08-09 RX ADMIN — INSULIN DETEMIR 30 UNITS: 100 INJECTION, SOLUTION SUBCUTANEOUS at 08:25

## 2019-08-09 RX ADMIN — INSULIN LISPRO 2 UNITS: 100 INJECTION, SOLUTION INTRAVENOUS; SUBCUTANEOUS at 17:33

## 2019-08-09 RX ADMIN — SODIUM BICARBONATE 650 MG TABLET 1300 MG: at 21:20

## 2019-08-09 RX ADMIN — Medication 250 MG: at 08:25

## 2019-08-09 RX ADMIN — METRONIDAZOLE 500 MG: 500 INJECTION, SOLUTION INTRAVENOUS at 08:24

## 2019-08-09 NOTE — PROGRESS NOTES
[facility]  PROGRESS NOTE    Patient Name: Kendrick Crystal  : 1970  MRN: [id number]    Date of Admission: 2019  Length of Stay: 10  Primary Care Physician: Keyanna Leone APRN    Subjective   Subjective     CC:  Abscess     HPI:  Reports 3-4 loose BM daily and concerned about diarrhea.  Has history of C. Diff last year.      Review of Systems  Gen- No fevers, chills  CV- No chest pain, palpitations  Resp- No cough, dyspnea  GI- No N/V/D, abd pain    Objective   Objective     Vital Signs:   Temp:  [97.3 °F (36.3 °C)-97.9 °F (36.6 °C)] 97.5 °F (36.4 °C)  Heart Rate:  [68-84] 71  Resp:  [18] 18  BP: (127-162)/(78-99) 152/91        Physical Exam:  GEN: NAD, sitting up in bed  HEENT: NC/AT, MMM  RESP: on room air, normal effort  CV: RRR  PSYCH:appropriate  affect, appropriate  NEURO: awake, alert, no focal deficits noted  MSK: no edema noted, left BKA  SKIN: no rashes noted     Results Reviewed:  I have personally reviewed current lab, radiology, and data and agree.    Results from last 7 days   Lab Units 19  1030 19  0738 19  0624   WBC 10*3/mm3 14.13* 13.61* 12.48*   HEMOGLOBIN g/dL 10.6* 10.9* 10.8*   HEMATOCRIT % 34.1* 35.1* 34.7*   PLATELETS 10*3/mm3 258 246 222     Results from last 7 days   Lab Units 19  0502 19  0738 19  0621  19  0905  19  0855   SODIUM mmol/L 138 138 137   < > 136   < > 133*   POTASSIUM mmol/L 5.0 5.2 5.3*   < > 5.6*   < > 4.9   CHLORIDE mmol/L 104 104 103   < > 106   < > 99   CO2 mmol/L 17.0* 17.0* 16.0*   < > 13.0*   < > 20.0*   BUN mg/dL 63* 61* 52*   < > 48*   < > 28*   CREATININE mg/dL 5.29* 4.94* 5.47*   < > 4.68*   < > 3.01*   GLUCOSE mg/dL 115* 234* 112*   < > 178*   < > 268*   CALCIUM mg/dL 7.7* 8.1* 7.8*   < > 8.0*   < > 7.9*   ALT (SGPT) U/L  --   --   --   --  20  --  26   AST (SGOT) U/L  --   --   --   --  17  --  23    < > = values in this interval not displayed.      Estimated Creatinine Clearance: 22.7 mL/min (A) (by C-G formula based on SCr of 5.29 mg/dL (H)).    Microbiology Results Abnormal     Procedure Component Value - Date/Time    Anaerobic Culture - Wound, Rectal Abscess [[id number]] Collected:  08/02/19 1620    Lab Status:  Final result Specimen:  Wound from Rectal Abscess Updated:  08/07/19 0914     Anaerobic Culture No anaerobes isolated at 5 days    Wound Culture - Wound, Rectal Abscess [[id number]]  (Abnormal)  (Susceptibility) Collected:  08/02/19 1620    Lab Status:  Final result Specimen:  Wound from Rectal Abscess Updated:  08/06/19 0922     Wound Culture Scant growth (1+) Escherichia coli      Scant growth (1+) Klebsiella pneumoniae ssp pneumoniae      Scant growth (1+) Normal Fecal Haily     Gram Stain Few (2+) WBCs seen      Occasional Yeast      Rare (1+) Gram negative bacilli    Narrative:       Specimen is an abscess - work up all GNRs per Dr. Dong ([facility] infection control).    Susceptibility      Escherichia coli     EYAD     Ampicillin Resistant     Ampicillin + Sulbactam Intermediate     Cefazolin Susceptible     Cefepime Susceptible     Ceftazidime Susceptible     Ceftriaxone Susceptible     Gentamicin Susceptible     Levofloxacin Resistant     Piperacillin + Tazobactam Susceptible     Tetracycline Susceptible     Trimethoprim + Sulfamethoxazole Susceptible                Susceptibility      Klebsiella pneumoniae ssp pneumoniae     EYAD     Ampicillin Resistant     Ampicillin + Sulbactam Susceptible     Cefazolin Susceptible     Cefepime Susceptible     Ceftazidime Susceptible     Ceftriaxone Susceptible     Gentamicin Susceptible     Levofloxacin Susceptible     Piperacillin + Tazobactam Susceptible     Tetracycline Susceptible     Trimethoprim + Sulfamethoxazole Susceptible                    Blood Culture - Blood, Hand, Left [[id number]] Collected:  07/30/19 1840    Lab Status:  Final result Specimen:  Blood from Hand, Left Updated:  08/04/19 6302      Blood Culture No growth at 5 days    Blood Culture - Blood, Arm, Right [[id number]] Collected:  07/30/19 1840    Lab Status:  Final result Specimen:  Blood from Arm, Right Updated:  08/04/19 2145     Blood Culture No growth at 5 days    Urine Culture - Urine, Urine, Clean Catch [[id number]]  (Normal) Collected:  07/30/19 1725    Lab Status:  Final result Specimen:  Urine, Clean Catch Updated:  07/31/19 2019     Urine Culture No growth          Imaging Results (last 24 hours)     ** No results found for the last 24 hours. **          Results for orders placed during the hospital encounter of 05/18/19   Adult Transthoracic Echo Complete W/ Cont if Necessary Per Protocol    Narrative · Estimated EF = 60%.  · Mild mitral valve regurgitation is present  · Mild tricuspid valve regurgitation is present.  · Calculated right ventricular systolic pressure from tricuspid   regurgitation is 29 mmHg.          I have reviewed the medications:  Scheduled Meds:    amLODIPine 10 mg Oral Q24H   ceftriaxone 1 g Intravenous Q24H   clotrimazole 10 mg Oral TID   DULoxetine 30 mg Oral Daily   heparin (porcine) 5,000 Units Subcutaneous Q8H   insulin detemir 30 Units Subcutaneous Q12H   insulin lispro 0-9 Units Subcutaneous 4x Daily With Meals & Nightly   insulin lispro 5 Units Subcutaneous TID With Meals   metoprolol tartrate 100 mg Oral Q12H   metroNIDAZOLE 500 mg Intravenous Q8H   micafungin (MYCAMINE)  mg Intravenous Q24H   saccharomyces boulardii 250 mg Oral BID   sodium bicarbonate 1,300 mg Oral BID   tamsulosin 0.8 mg Oral Nightly   terazosin 5 mg Oral Nightly     Continuous Infusions:     PRN Meds:.dextrose  •  dextrose  •  glucagon (human recombinant)  •  HYDROcodone-acetaminophen  •  ondansetron **OR** ondansetron  •  sodium chloride      Assessment/Plan   Assessment / Plan     Active Hospital Problems    Diagnosis  POA   • **Sepsis (CMS/HCC) [A41.9]  Yes   • Abscess [L02.91]  Yes   • UTI (urinary tract infection) [N39.0]   "Yes   • Hyponatremia [E87.1]  Yes   • DM (diabetes mellitus) type II uncontrolled, periph vascular disorder (CMS/HCC) [E11.51, E11.65]  Yes   • Peripheral vascular disease (CMS/[facility]) [I73.9]  Yes   • S/P BKA (below knee amputation), left (CMS/[facility]) [Z89.512]  Not Applicable   • JUAN (acute kidney injury) (CMS/[facility]) [N17.9]  Unknown   • History of MRSA infection [Z86.14]  Yes   • Type 2 diabetes mellitus with circulatory disorder and uncontrolled [E11.59]  Yes   • Essential hypertension [I10]  Yes   • Hyperlipemia [E78.5]  Yes   • DUNLAP Cirrhosis [K74.60]  Yes   • Non-compliance [Z91.14]  Not Applicable      Resolved Hospital Problems   No resolved problems to display.        Brief Hospital Course to date:  Kendrick Crystal is a 48 year old gentleman with uncontrolled diabetes who presents with sepsis secondary to abscess inferior to the prostate and anterior to the rectum.    Sepsis, claudia-rectal abscess, UTI   -pt met sepsis criteria based on elevated lactic acid, leukocytosis, tachycardia and positive source  -CT abd/pel obtained: \"3.0 x 3.2 cm fluid collection with enhancing margins consistent with an abscess related to the inferior aspect of the prostate gland and anterior to the distal rectum.\"  -abscess drainage done 8/2, cultures with e coli and klebsiella.  Continue ceftriaxone, flagyl, micafungin    JUAN  Metabolic acidosis  Hyperkalemia  -Renal following.  Multifactorial.    -Remains on valtessa for K+    Hyponatremia  - improved, stable     Uncontrolled T2DM, a1c>14%  -pt reports that he often \"forgets\" to take his routine medications; including his insulin and does not check his glucose daily  -blood sugar >600 on admission, but did not meet criteria for DKA, now improving with subQ insulin      HTN  -labile, monitor   -have stopped lisinopril given JUAN- monitor closely, norvasc increased 8/6     DVT prophylaxis:  scd- right leg , heparin       Disposition: I expect the patient to be discharged home 4-5 " days.    CODE STATUS:   Code Status and Medical Interventions:   Ordered at: 07/30/19 2051     Code Status:    CPR     Medical Interventions (Level of Support Prior to Arrest):    Full       Electronically signed by Jazmín Garcia MD, 08/09/19, 5:54 PM.

## 2019-08-09 NOTE — NURSING NOTE
Pt was ordered removal of childs and UA/CS by Dr. Rubio. In round during discussion of orders with Dr. Valdivia gave VO to DC UA/CS and that he would discuss this with Dr. Rubio. VO placed to DC UA/CS. Charge nurse and unit director present as well as  and pharmacy. No other orders at this time. Report given to nurse (Brenda) upon her arrival at 12:00 and she was advised that order for UA/CS was to be DC'd per Dr. Valdivia. Verbalized understanding.

## 2019-08-09 NOTE — PROGRESS NOTES
"   LOS: 10 days    Patient Care Team:  Keyanna Leone APRN as PCP - General (Nurse Practitioner)    Reason For Visit:  F/U JUAN  Subjective           Review of Systems:    Pulm: No soa   CV:  No CP. GI: NO N/V/ANOREXIA.      Objective       amLODIPine 10 mg Oral Q24H   ceftriaxone 1 g Intravenous Q24H   clotrimazole 10 mg Oral TID   DULoxetine 30 mg Oral Daily   heparin (porcine) 5,000 Units Subcutaneous Q8H   insulin detemir 30 Units Subcutaneous Q12H   insulin lispro 0-9 Units Subcutaneous 4x Daily With Meals & Nightly   insulin lispro 5 Units Subcutaneous TID With Meals   metoprolol tartrate 100 mg Oral Q12H   metroNIDAZOLE 500 mg Intravenous Q8H   micafungin (MYCAMINE)  mg Intravenous Q24H   saccharomyces boulardii 250 mg Oral BID   sodium bicarbonate 1,300 mg Oral BID   tamsulosin 0.8 mg Oral Nightly   terazosin 5 mg Oral Nightly            Vital Signs:  Blood pressure 155/91, pulse 69, temperature 97.4 °F (36.3 °C), temperature source Oral, resp. rate 18, height 190.5 cm (75\"), weight 113 kg (250 lb), SpO2 96 %.    Flowsheet Rows      First Filed Value   Admission Height  190.5 cm (75\") Documented at 07/30/2019 1623   Admission Weight  113 kg (250 lb) Documented at 07/30/2019 1623          08/08 0701 - 08/09 0700  In: 700 [P.O.:600]  Out: 4350 [Urine:4350]    Physical Exam:    General Appearance: NAD, alert and cooperative, Ox3  Eyes: PER, conjunctivae and sclerae normal, no icterus  Lungs: respirations regular and unlabored, no crepitus, clear to auscultation  Heart/CV: regular rhythm & normal rate, no murmur, no gallop, no rub and TR edema  Abdomen: not distended, soft, non-tender, no masses,  bowel sounds present  Skin: No rash, Warm and dry    Radiology:            Labs:  Results from last 7 days   Lab Units 08/09/19  1030 08/08/19  0738 08/07/19  0624   WBC 10*3/mm3 14.13* 13.61* 12.48*   HEMOGLOBIN g/dL 10.6* 10.9* 10.8*   HEMATOCRIT % 34.1* 35.1* 34.7*   PLATELETS 10*3/mm3 258 246 222     Results " from last 7 days   Lab Units 08/09/19  0502 08/08/19  0738 08/07/19  0621 08/06/19  1109 08/06/19  0905  08/04/19  0855   SODIUM mmol/L 138 138 137 137 136   < > 133*   POTASSIUM mmol/L 5.0 5.2 5.3* 5.4* 5.6*   < > 4.9   CHLORIDE mmol/L 104 104 103 103 106   < > 99   CO2 mmol/L 17.0* 17.0* 16.0* 16.0* 13.0*   < > 20.0*   BUN mg/dL 63* 61* 52* 47* 48*   < > 28*   CREATININE mg/dL 5.29* 4.94* 5.47* 4.57* 4.68*   < > 3.01*   CALCIUM mg/dL 7.7* 8.1* 7.8* 7.5* 8.0*   < > 7.9*   ALBUMIN g/dL  --   --   --   --  2.40*  --  2.60*    < > = values in this interval not displayed.     Results from last 7 days   Lab Units 08/09/19  0502   GLUCOSE mg/dL 115*       Results from last 7 days   Lab Units 08/06/19  0905   ALK PHOS U/L 201*   BILIRUBIN mg/dL 0.3   ALT (SGPT) U/L 20   AST (SGOT) U/L 17                 Estimated Creatinine Clearance: 22.7 mL/min (A) (by C-G formula based on SCr of 5.29 mg/dL (H)).      Assessment       Sepsis (CMS/[facility])    DUNLAP Cirrhosis    Essential hypertension    Non-compliance    Hyperlipemia    Type 2 diabetes mellitus with circulatory disorder and uncontrolled    History of MRSA infection    JUAN (acute kidney injury) (CMS/[facility])    S/P BKA (below knee amputation), left (CMS/[facility])    Peripheral vascular disease (CMS/[facility])    DM (diabetes mellitus) type II uncontrolled, periph vascular disorder (CMS/[facility])    Abscess    UTI (urinary tract infection)    Hyponatremia            Impression: NONOLIGURIC JUAN. WORSE GFR. ASYMPTOMATIC. ANEMIA.            Recommendations: WATCH. HOPEFULLY CAN AVOID DIALYSIS.      Brad Nash MD  08/09/19  3:45 PM

## 2019-08-09 NOTE — PROGRESS NOTES
Continued Stay Note  [facility]     Patient Name: Kendrick Crystal  MRN: [id number]  Today's Date: 8/9/2019    Admit Date: 7/30/2019    Discharge Plan     Row Name 08/09/19 0811       Plan    Plan  Home w/ [facility]    Patient/Family in Agreement with Plan  yes    Plan Comments  Spoke w/ patient at bedside. Denies any needs at this time. CM will continue to follow    Final Discharge Disposition Code  06 - home with home health care        Discharge Codes    No documentation.       Expected Discharge Date and Time     Expected Discharge Date Expected Discharge Time    Aug 13, 2019             Fadi Valentine RN

## 2019-08-09 NOTE — PROGRESS NOTES
[facility] Progress Note        Antibiotics:  Anti-Infectives (From admission, onward)    Ordered     Dose/Rate Route Frequency Start Stop    19 1426  cefTRIAXone (ROCEPHIN) 1 g/100 mL 0.9% NS (MBP)     Ordering Provider:  Shahbaz Valdivia MD    1 g  over 30 Minutes Intravenous Every 24 Hours 19 1600 19 1559    19 1426  metroNIDAZOLE (FLAGYL) 500 mg/100mL IVPB     Ordering Provider:  Shahbaz Valdivia MD    500 mg  over 60 Minutes Intravenous Every 8 Hours 19 1600 19 1559    19 0955  micafungin 100 mg/100 mL 0.9% NS IVPB (mbp)     Ordering Provider:  Usman Dong MD    100 mg  over 60 Minutes Intravenous Every 24 Hours 19 1100 08/10/19 1059    19 2051  [MAR Hold]  piperacillin-tazobactam (ZOSYN) 3.375 g in iso-osmotic dextrose 50 ml (premix)     (MAR Hold since 19 1447)   Comments:  First dose given in ED.   Ordering Provider:  Keyanna Aponte APRN    3.375 g  over 4 Hours Intravenous Every 8 Hours 19 0100 19 0059    19 1820  vancomycin 2250 mg/500 mL 0.9% NS IVPB ([facility])     Ordering Provider:  Kendrick Sotelo MD    20 mg/kg × 113 kg  over 3 Hours Intravenous Once 19 1822 19 2155    19 1820  piperacillin-tazobactam (ZOSYN) 3.375 g in iso-osmotic dextrose 50 ml (premix)     Ordering Provider:  Kendrick Sotelo MD    3.375 g  over 30 Minutes Intravenous Once 19 1822 19 1924          CC: rectal pain    HPI:    Patient is a 48 y.o.  Yr old male with history of poorly contolled T2DM, DUNLAP/liver cirrhosis, HTN, PVD/Left BKA, and multiple skin abscesses/MRSA who presented to [facility] ED with right shin wound infection summer 2018.  He was unable to get to see Dr. Martinez as his father passed away unexpectedly on 18 and he had to wait until after the .  The wound worsened becoming gangrenous and he came to [facility] ED in 2018.  He also had been hospitalized at [facility] and then  transferred to  for a severe penis/scrotum infection requiring surgical debridement in summer 2018.  He received antibiotics regarding his right leg infection, generally improved over the remainder of summer 2018 but that area had not completely healed. He was admitted April 24, 2019 with acute left lower abdominal wall redness/swelling and pain, spontaneous drainage associated with fever/chills and uncontrolled blood sugars.  4/26 I&D requiring wide debridement , severe/deep infection and transferred to [facility] on May 3 with IV Zosyn/oral doxycycline. Cultures had lactobacillus and mixed gram-positive skin sherly including alpha strep, staph epidermidis and corynebacterium. Readmitted to [facility] May 18, 2019, increasing generalized edema ultimately transitioned to oral doxycycline and healed.     He presented to the emergency room July 30, 2019 with acute rectal pain that had begun 7/27; this is associated with constipation for days, chills and nausea/dry heaving.  White blood cell count elevated, high hemoglobin A1c over 13, urinalysis with hematuria/pyuria and CT scan with 3 x 3 cm fluid collection anterior to the distal rectum and per discussion with Dr. Akbar/Jennifer, this is not in the prostate.  Colorectal surgery/urology saw him for consideration of surgical options/timing/threshold, etc..     8/2/19 I&Dper Dr Payne perirectal abscess; patient reports that he had instrumented his rectum prior to hospitalization with enema    8/3/19 urinary retention overnight requiring in/out catheterization per patient.  Creat climb    8/4/19 further creat climb; childs placed and lisinopril stopped and d/w Dr Rubio    8/7 in retrospect he remembers air in his urine at admit which has raised concern for possible fistula from urinary tract;  No fecaluria and culture from abscess is fecal sherly;  ?rectal-urethral fistula;  Dr Payne aware     8/7/19  He reports fatigue and some perirectal pain but not  "progressive , currently dull, nonradiating, better with pain meds, 3 out of 10 at present.  He denies hematochezia melena or hematemesis.  No diarrhea.  Recent Constipation as above.  No  hematuria or pyuria.      8/9: Patient is alert and awake upon arrival.  States he is tolerating his antibiotic.  He states that his pain is much better and rates his pain is a 1 of 10 this morning he has been afebrile and hemodynamically stable overnight. Stools are now more frequent and are associated with some urgency but still formed  He had cdiff colitis last year at [facility] and at that time he had liquid stools.  He denies abdominal cramping. Walker has been removed BUN and creatinine remain elevated and are worsening.  No new microbiology results today.  No new radiological reports today.           PE:   /87 (BP Location: Right arm, Patient Position: Lying)   Pulse 68   Temp 97.9 °F (36.6 °C) (Axillary)   Resp 18   Ht 190.5 cm (75\")   Wt 113 kg (250 lb)   SpO2 94%   BMI 31.25 kg/m²       GENERAL: sleepy, in no acute distress.   HEENT: Normocephalic, atraumatic. No icterus. Oropharynx clear without evidence of thrush or exudate. No evidence of peridontal disease.    NECK: Supple without nuchal rigidity. No mass.  LYMPH: No cervical, axillary lymphadenopathy.  HEART: RRR; No murmur, rubs, gallops.   LUNGS: Clear to auscultation bilaterally without wheezing, rales, rhonchi. Normal respiratory effort. Nonlabored. No dullness.  States some shortness of breath.  ABDOMEN: Abdomen is firm with positive bowel sounds, no pain with palpation  EXT:  No cyanosis, clubbing or edema. No cord.  :   Unable to elicit any perineal tenderness.  No discrete mass bulge or fluctuance.  No crepitus or bulla.  I am unable to elicit any perianal tenderness with no obvious bulge or fluctuance there either.  Slight candidiasis  MSK: FROM without joint effusions noted arms/legs.    SKIN: Warm and dry without cutaneous eruptions on " Inspection/palpation.    NEURO: sleepy     No peripheral stigmata/phenomena of endocarditis           Laboratory Data    Results from last 7 days   Lab Units 08/08/19  0738 08/07/19  0624 08/06/19  0905   WBC 10*3/mm3 13.61* 12.48* 14.44*   HEMOGLOBIN g/dL 10.9* 10.8* 11.6*   HEMATOCRIT % 35.1* 34.7* 38.1   PLATELETS 10*3/mm3 246 222 232     Results from last 7 days   Lab Units 08/09/19  0502   SODIUM mmol/L 138   POTASSIUM mmol/L 5.0   CHLORIDE mmol/L 104   CO2 mmol/L 17.0*   BUN mg/dL 63*   CREATININE mg/dL 5.29*   GLUCOSE mg/dL 115*   CALCIUM mg/dL 7.7*     Results from last 7 days   Lab Units 08/06/19  0905   ALK PHOS U/L 201*   BILIRUBIN mg/dL 0.3   ALT (SGPT) U/L 20   AST (SGOT) U/L 17               Estimated Creatinine Clearance: 22.7 mL/min (A) (by C-G formula based on SCr of 5.29 mg/dL (H)).      Microbiology:      Radiology:  Imaging Results (last 72 hours)     Procedure Component Value Units Date/Time    CT Abdomen Pelvis With Contrast [[id number]] Collected:  07/30/19 1749     Updated:  07/31/19 1034    Narrative:       EXAMINATION: CT ABDOMEN PELVIS W CONTRAST- 07/30/2019      INDICATION: lower abd pain     TECHNIQUE: CT scan of the abdomen and pelvis was performed with  intravenous contrast.     The radiation dose reduction device was turned on for each scan per the  ALARA (As Low as Reasonably Achievable) protocol.     COMPARISON: 05/30/2019     FINDINGS: The most superior images demonstrate no basilar inflammatory  inflammatory process or pleural effusion. Small effusions have resolved  since previous examination. The liver and spleen are normal. There are  small bilateral fat-containing adrenal nodules consistent bilateral  myolipomas, unchanged. The pancreas is normal. There is no renal mass,  stone or obstruction. There is no ascites,  aneurysm or retroperitoneal  lymphadenopathy. There is diffuse thickening of the bladder wall,  probably related to the peroneal abscess. There is no  inguinal  lymphadenopathy. There are sigmoid diverticula without features of  diverticulitis. The appendix is normal.     Just inferior to the prostate gland and anterior to the distal rectum  there is a 3.0 x 3.2 cm fluid collection with enhancing margins  consistent with an abscess.       Impression:       There is a 3.0 x 3.2 cm fluid collection with enhancing  margins consistent with an abscess related to the inferior aspect of the  prostate gland and anterior to the distal rectum.     D:  07/30/2019  E:  07/31/2019     This report was finalized on 7/31/2019 10:31 AM by Dr. Guillermo Akbar MD.               Impression:     --Acute sepsis per internal medicine admission note, concern for abscess in the perirectal tissue as etiology.  Empiric antibiotics ongoing with risk for mixed infection.  drainage 8/2; culture data pending.  Broad-spectrum antimicrobials ongoing.     --Acute perirectal abscess associated with constipation as above, air in urine as above.  Colorectal surgery, Dr. Payne following to help guide timing/options/threshold for further drainage if needed.  Drainage as above on August 2 ;  Mixed Fecal sherly in culture     --History urinary retention.    Recurrent retention overnight August 2-August 3.  Medicine following to help guide further work-up, urology input, etc.    --ARF 8/3-8/7;  ?obstruction initially associated with retention postop (1200 out with I/O cath per nursing ) -v- contrast from CT -v- lisinopril/medication/vancomycin -v- relative hypotension -v- likely combination of factors with ATN; nephrology following;  R/o recurrent urinary retention    --Cutaneous candidiasis and yeast in gram stain of abscess.  Mycamine added     --History MRSA;  Not in current culture     --Diabetes mellitus type 2, uncontrolled.  He reports the reason for this is forgetfulness     --History Johnston and chronic liver disease per past notes     --Left BKA     --Peripheral vascular disease    -- History of Cdiff  colitis 2018    --hyperkalemia per medicine/nephrology        PLAN:    --IV rocephin/flagyl/ Mycamine  Consider expanding gram negative coverage if WBC continues to rise    -- Will ask for bid PVRs which will be recorded in the flow sheet     --Check/review labs cultures and scans     --History per nursing staff    -- monitor for diarrhea     --Discussed with Dr Garcia     --Highly complex set of issues with high risk for further serious morbidity and other serious sequela    --nephrology following;  worsening renal function today    Discussed with RN                Gilbert Olea, APRN  8/9/2019

## 2019-08-09 NOTE — PLAN OF CARE
Problem: Patient Care Overview  Goal: Plan of Care Review  Outcome: Ongoing (interventions implemented as appropriate)   08/09/19 1752   Coping/Psychosocial   Plan of Care Reviewed With patient;spouse   Plan of Care Review   Progress improving   OTHER   Outcome Summary vital signs stable, voiding without difficulties post void residual obtained and charted, bm x 1 this shift no further bowel movements pt aware of need for stool sample. wife at bedside , brings patient food from cafeteria        Problem: Skin Injury Risk (Adult)  Goal: Identify Related Risk Factors and Signs and Symptoms  Outcome: Ongoing (interventions implemented as appropriate)   08/09/19 1752   Skin Injury Risk (Adult)   Related Risk Factors (Skin Injury Risk) body weight extremes;edema;infection;mobility impaired;tissue perfusion altered     Goal: Skin Health and Integrity  Outcome: Ongoing (interventions implemented as appropriate)   08/09/19 1752   Skin Injury Risk (Adult)   Skin Health and Integrity making progress toward outcome       Problem: Fall Risk (Adult)  Goal: Identify Related Risk Factors and Signs and Symptoms  Outcome: Ongoing (interventions implemented as appropriate)   08/09/19 1752   Fall Risk (Adult)   Related Risk Factors (Fall Risk) depression/anxiety;fatigue/slow reaction;gait/mobility problems;history of falls;sleep pattern alteration;environment unfamiliar   Signs and Symptoms (Fall Risk) presence of risk factors     Goal: Absence of Fall  Outcome: Ongoing (interventions implemented as appropriate)   08/09/19 1752   Fall Risk (Adult)   Absence of Fall making progress toward outcome       Problem: Pain, Chronic (Adult)  Goal: Acceptable Pain/Comfort Level and Functional Ability  Outcome: Ongoing (interventions implemented as appropriate)   08/09/19 1752   Pain, Chronic (Adult)   Acceptable Pain/Comfort Level and Functional Ability making progress toward outcome       Problem: Sepsis/Septic Shock (Adult)  Goal: Signs and  Symptoms of Listed Potential Problems Will be Absent, Minimized or Managed (Sepsis/Septic Shock)  Outcome: Ongoing (interventions implemented as appropriate)   08/09/19 1752   Goal/Outcome Evaluation   Problems Assessed (Sepsis) all   Problems Present (Sepsis) situational response;glycemic control impaired;infection progression       Problem: Diabetes, Type 2 (Adult)  Goal: Signs and Symptoms of Listed Potential Problems Will be Absent, Minimized or Managed (Diabetes, Type 2)  Outcome: Ongoing (interventions implemented as appropriate)   08/09/19 1752   Goal/Outcome Evaluation   Problems Assessed (Type 2 Diabetes) all   Problems Present (Type 2 Diabetes) hyperglycemia;situational response       Problem: Pain, Acute (Adult)  Goal: Acceptable Pain Control/Comfort Level  Outcome: Ongoing (interventions implemented as appropriate)   08/09/19 1752   Pain, Acute (Adult)   Acceptable Pain Control/Comfort Level making progress toward outcome

## 2019-08-09 NOTE — PROGRESS NOTES
Kendrick Crystal     LOS: 10 days     Subjective   Patient feels well today.  No perineal pain.  Passing abundant urine but it has not been completely measured.  Creatinine has bumped to 5.2.  CBC pending.      Objective     Vital Signs  Vitals:    08/09/19 0824   BP: 143/81   Pulse: 74   Resp:    Temp:    SpO2:        I/O  Intake & Output (last day)       08/08 0701 - 08/09 0700 08/09 0701 - 08/10 0700    P.O. 600     IV Piggyback 100 100    Total Intake(mL/kg) 700 (6.2) 100 (0.9)    Urine (mL/kg/hr) 4350 (1.6)     Stool 0     Total Output 4350     Net -3650 +100          Stool Unmeasured Occurrence 2 x           Physical Exam:  Abdomen soft and nontender.           Results Review:       WBC WBC   Date Value Ref Range Status   08/08/2019 13.61 (H) 3.40 - 10.80 10*3/mm3 Final   08/07/2019 12.48 (H) 3.40 - 10.80 10*3/mm3 Final      HGB Hemoglobin   Date Value Ref Range Status   08/08/2019 10.9 (L) 13.0 - 17.7 g/dL Final   08/07/2019 10.8 (L) 13.0 - 17.7 g/dL Final      HCT Hematocrit   Date Value Ref Range Status   08/08/2019 35.1 (L) 37.5 - 51.0 % Final   08/07/2019 34.7 (L) 37.5 - 51.0 % Final      PLT        Results from last 7 days   Lab Units 08/08/19  0738   PLATELETS 10*3/mm3 246            Sodium Sodium   Date Value Ref Range Status   08/09/2019 138 136 - 145 mmol/L Final   08/08/2019 138 136 - 145 mmol/L Final   08/07/2019 137 136 - 145 mmol/L Final   08/06/2019 137 136 - 145 mmol/L Final      Potassium Potassium   Date Value Ref Range Status   08/09/2019 5.0 3.5 - 5.2 mmol/L Final   08/08/2019 5.2 3.5 - 5.2 mmol/L Final   08/07/2019 5.3 (H) 3.5 - 5.2 mmol/L Final   08/06/2019 5.4 (H) 3.5 - 5.2 mmol/L Final      Chloride Chloride   Date Value Ref Range Status   08/09/2019 104 98 - 107 mmol/L Final   08/08/2019 104 98 - 107 mmol/L Final   08/07/2019 103 98 - 107 mmol/L Final   08/06/2019 103 98 - 107 mmol/L Final      Bicarbonate No results found for: PLASMABICARB   BUN BUN   Date Value Ref Range Status    08/09/2019 63 (H) 6 - 20 mg/dL Final   08/08/2019 61 (H) 6 - 20 mg/dL Final   08/07/2019 52 (H) 6 - 20 mg/dL Final   08/06/2019 47 (H) 6 - 20 mg/dL Final      Creatinine Creatinine   Date Value Ref Range Status   08/09/2019 5.29 (H) 0.76 - 1.27 mg/dL Final   08/08/2019 4.94 (H) 0.76 - 1.27 mg/dL Final   08/07/2019 5.47 (H) 0.76 - 1.27 mg/dL Final   08/06/2019 4.57 (H) 0.76 - 1.27 mg/dL Final      Calcium Calcium   Date Value Ref Range Status   08/09/2019 7.7 (L) 8.6 - 10.5 mg/dL Final   08/08/2019 8.1 (L) 8.6 - 10.5 mg/dL Final   08/07/2019 7.8 (L) 8.6 - 10.5 mg/dL Final   08/06/2019 7.5 (L) 8.6 - 10.5 mg/dL Final      Magnesium No results found for: MG         Imaging Results (last 24 hours)     ** No results found for the last 24 hours. **          Assessment/Plan       Sepsis (CMS/[facility])    DUNLAP Cirrhosis    Essential hypertension    Non-compliance    Hyperlipemia    Type 2 diabetes mellitus with circulatory disorder and uncontrolled    History of MRSA infection    JUAN (acute kidney injury) (CMS/[facility])    S/P BKA (below knee amputation), left (CMS/[facility])    Peripheral vascular disease (CMS/[facility])    DM (diabetes mellitus) type II uncontrolled, periph vascular disorder (CMS/[facility])    Abscess    UTI (urinary tract infection)    Hyponatremia      Clinically patient feels better.  His creatinine remains elevated.  He is passing urine and no longer has retention.  Perirectal abscess appears to be resolving.  If his white blood cell count is trending downward will sign off and be available if needed.      Mumtaz Payne MD  08/09/19  9:08 AM

## 2019-08-10 LAB
ALBUMIN SERPL-MCNC: 2.5 G/DL (ref 3.5–5.2)
ANION GAP SERPL CALCULATED.3IONS-SCNC: 14 MMOL/L (ref 5–15)
BUN BLD-MCNC: 66 MG/DL (ref 6–20)
BUN/CREAT SERPL: 13.4 (ref 7–25)
CALCIUM SPEC-SCNC: 7.8 MG/DL (ref 8.6–10.5)
CHLORIDE SERPL-SCNC: 106 MMOL/L (ref 98–107)
CO2 SERPL-SCNC: 17 MMOL/L (ref 22–29)
CREAT BLD-MCNC: 4.92 MG/DL (ref 0.76–1.27)
DEPRECATED RDW RBC AUTO: 50.7 FL (ref 37–54)
ERYTHROCYTE [DISTWIDTH] IN BLOOD BY AUTOMATED COUNT: 16.6 % (ref 12.3–15.4)
GFR SERPL CREATININE-BSD FRML MDRD: 13 ML/MIN/1.73
GFR SERPL CREATININE-BSD FRML MDRD: ABNORMAL ML/MIN/1.73
GLUCOSE BLD-MCNC: 155 MG/DL (ref 65–99)
GLUCOSE BLDC GLUCOMTR-MCNC: 134 MG/DL (ref 70–130)
GLUCOSE BLDC GLUCOMTR-MCNC: 148 MG/DL (ref 70–130)
GLUCOSE BLDC GLUCOMTR-MCNC: 177 MG/DL (ref 70–130)
GLUCOSE BLDC GLUCOMTR-MCNC: 186 MG/DL (ref 70–130)
HCT VFR BLD AUTO: 33.2 % (ref 37.5–51)
HGB BLD-MCNC: 10.6 G/DL (ref 13–17.7)
MCH RBC QN AUTO: 27.5 PG (ref 26.6–33)
MCHC RBC AUTO-ENTMCNC: 31.9 G/DL (ref 31.5–35.7)
MCV RBC AUTO: 86.2 FL (ref 79–97)
PHOSPHATE SERPL-MCNC: 8.2 MG/DL (ref 2.5–4.5)
PLATELET # BLD AUTO: 242 10*3/MM3 (ref 140–450)
PMV BLD AUTO: 11.7 FL (ref 6–12)
POTASSIUM BLD-SCNC: 5.1 MMOL/L (ref 3.5–5.2)
RBC # BLD AUTO: 3.85 10*6/MM3 (ref 4.14–5.8)
SODIUM BLD-SCNC: 137 MMOL/L (ref 136–145)
WBC NRBC COR # BLD: 12.73 10*3/MM3 (ref 3.4–10.8)
WBC STL QL MICRO: NORMAL

## 2019-08-10 PROCEDURE — 80069 RENAL FUNCTION PANEL: CPT | Performed by: INTERNAL MEDICINE

## 2019-08-10 PROCEDURE — 87045 FECES CULTURE AEROBIC BACT: CPT | Performed by: INTERNAL MEDICINE

## 2019-08-10 PROCEDURE — 82962 GLUCOSE BLOOD TEST: CPT

## 2019-08-10 PROCEDURE — 85027 COMPLETE CBC AUTOMATED: CPT | Performed by: INTERNAL MEDICINE

## 2019-08-10 PROCEDURE — 99232 SBSQ HOSP IP/OBS MODERATE 35: CPT | Performed by: NURSE PRACTITIONER

## 2019-08-10 PROCEDURE — 25010000002 HEPARIN (PORCINE) PER 1000 UNITS: Performed by: INTERNAL MEDICINE

## 2019-08-10 PROCEDURE — 87046 STOOL CULTR AEROBIC BACT EA: CPT | Performed by: INTERNAL MEDICINE

## 2019-08-10 PROCEDURE — 63710000001 INSULIN DETEMIR PER 5 UNITS: Performed by: INTERNAL MEDICINE

## 2019-08-10 PROCEDURE — 25010000002 CEFTRIAXONE PER 250 MG: Performed by: INTERNAL MEDICINE

## 2019-08-10 PROCEDURE — 87205 SMEAR GRAM STAIN: CPT | Performed by: INTERNAL MEDICINE

## 2019-08-10 RX ORDER — SEVELAMER CARBONATE FOR ORAL SUSPENSION 800 MG/1
800 POWDER, FOR SUSPENSION ORAL
Status: DISCONTINUED | OUTPATIENT
Start: 2019-08-10 | End: 2019-08-14 | Stop reason: HOSPADM

## 2019-08-10 RX ADMIN — SEVELAMER CARBONATE 0.8 G: 0.8 POWDER, FOR SUSPENSION ORAL at 18:44

## 2019-08-10 RX ADMIN — HEPARIN SODIUM 5000 UNITS: 5000 INJECTION INTRAVENOUS; SUBCUTANEOUS at 05:50

## 2019-08-10 RX ADMIN — INSULIN DETEMIR 30 UNITS: 100 INJECTION, SOLUTION SUBCUTANEOUS at 20:57

## 2019-08-10 RX ADMIN — DULOXETINE HYDROCHLORIDE 30 MG: 30 CAPSULE, DELAYED RELEASE ORAL at 09:01

## 2019-08-10 RX ADMIN — MICAFUNGIN SODIUM 100 MG: 20 INJECTION, POWDER, LYOPHILIZED, FOR SOLUTION INTRAVENOUS at 15:21

## 2019-08-10 RX ADMIN — METRONIDAZOLE 500 MG: 500 INJECTION, SOLUTION INTRAVENOUS at 16:58

## 2019-08-10 RX ADMIN — Medication 250 MG: at 09:00

## 2019-08-10 RX ADMIN — INSULIN LISPRO 2 UNITS: 100 INJECTION, SOLUTION INTRAVENOUS; SUBCUTANEOUS at 20:56

## 2019-08-10 RX ADMIN — METOPROLOL TARTRATE 100 MG: 100 TABLET ORAL at 20:56

## 2019-08-10 RX ADMIN — HEPARIN SODIUM 5000 UNITS: 5000 INJECTION INTRAVENOUS; SUBCUTANEOUS at 20:56

## 2019-08-10 RX ADMIN — TAMSULOSIN HYDROCHLORIDE 0.8 MG: 0.4 CAPSULE ORAL at 20:56

## 2019-08-10 RX ADMIN — HEPARIN SODIUM 5000 UNITS: 5000 INJECTION INTRAVENOUS; SUBCUTANEOUS at 15:21

## 2019-08-10 RX ADMIN — SODIUM BICARBONATE 650 MG TABLET 1300 MG: at 20:56

## 2019-08-10 RX ADMIN — METRONIDAZOLE 500 MG: 500 INJECTION, SOLUTION INTRAVENOUS at 01:28

## 2019-08-10 RX ADMIN — SODIUM BICARBONATE 650 MG TABLET 1300 MG: at 09:00

## 2019-08-10 RX ADMIN — CLOTRIMAZOLE 10 MG: 10 LOZENGE ORAL; TOPICAL at 09:00

## 2019-08-10 RX ADMIN — TERAZOSIN HYDROCHLORIDE ANHYDROUS 5 MG: 5 CAPSULE ORAL at 20:55

## 2019-08-10 RX ADMIN — METRONIDAZOLE 500 MG: 500 INJECTION, SOLUTION INTRAVENOUS at 09:00

## 2019-08-10 RX ADMIN — AMLODIPINE BESYLATE 10 MG: 10 TABLET ORAL at 09:00

## 2019-08-10 RX ADMIN — METOPROLOL TARTRATE 100 MG: 100 TABLET ORAL at 09:00

## 2019-08-10 RX ADMIN — INSULIN DETEMIR 30 UNITS: 100 INJECTION, SOLUTION SUBCUTANEOUS at 09:01

## 2019-08-10 RX ADMIN — CEFTRIAXONE SODIUM 1 G: 1 INJECTION, POWDER, FOR SOLUTION INTRAMUSCULAR; INTRAVENOUS at 16:29

## 2019-08-10 RX ADMIN — Medication 250 MG: at 20:56

## 2019-08-10 NOTE — PLAN OF CARE
Problem: Patient Care Overview  Goal: Plan of Care Review  Outcome: Ongoing (interventions implemented as appropriate)   08/10/19 1730   Coping/Psychosocial   Plan of Care Reviewed With patient;spouse   Plan of Care Review   Progress improving   OTHER   Outcome Summary bp slightly elevated this date, voiding without difficulties, bm x 1 this shift,      Goal: Discharge Needs Assessment  Outcome: Ongoing (interventions implemented as appropriate)      Problem: Skin Injury Risk (Adult)  Goal: Identify Related Risk Factors and Signs and Symptoms  Outcome: Ongoing (interventions implemented as appropriate)   08/10/19 1730   Skin Injury Risk (Adult)   Related Risk Factors (Skin Injury Risk) body weight extremes;edema;infection;mobility impaired;tissue perfusion altered     Goal: Skin Health and Integrity  Outcome: Ongoing (interventions implemented as appropriate)   08/10/19 1730   Skin Injury Risk (Adult)   Skin Health and Integrity making progress toward outcome       Problem: Fall Risk (Adult)  Goal: Identify Related Risk Factors and Signs and Symptoms  Outcome: Ongoing (interventions implemented as appropriate)   08/10/19 1730   Fall Risk (Adult)   Related Risk Factors (Fall Risk) history of falls;impaired vision;environment unfamiliar;gait/mobility problems   Signs and Symptoms (Fall Risk) presence of risk factors     Goal: Absence of Fall  Outcome: Ongoing (interventions implemented as appropriate)   08/10/19 1730   Fall Risk (Adult)   Absence of Fall making progress toward outcome       Problem: Pain, Chronic (Adult)  Goal: Acceptable Pain/Comfort Level and Functional Ability  Outcome: Ongoing (interventions implemented as appropriate)   08/10/19 1730   Pain, Chronic (Adult)   Acceptable Pain/Comfort Level and Functional Ability making progress toward outcome       Problem: Sepsis/Septic Shock (Adult)  Goal: Signs and Symptoms of Listed Potential Problems Will be Absent, Minimized or Managed (Sepsis/Septic  Shock)  Outcome: Ongoing (interventions implemented as appropriate)   08/10/19 1730   Goal/Outcome Evaluation   Problems Assessed (Sepsis) all   Problems Present (Sepsis) situational response;infection progression;glycemic control impaired       Problem: Diabetes, Type 2 (Adult)  Goal: Signs and Symptoms of Listed Potential Problems Will be Absent, Minimized or Managed (Diabetes, Type 2)  Outcome: Ongoing (interventions implemented as appropriate)   08/10/19 1730   Goal/Outcome Evaluation   Problems Assessed (Type 2 Diabetes) all   Problems Present (Type 2 Diabetes) hyperglycemia;situational response       Problem: Pain, Acute (Adult)  Goal: Acceptable Pain Control/Comfort Level  Outcome: Ongoing (interventions implemented as appropriate)   08/10/19 1730   Pain, Acute (Adult)   Acceptable Pain Control/Comfort Level making progress toward outcome

## 2019-08-10 NOTE — PROGRESS NOTES
"   LOS: 11 days    Patient Care Team:  Keyanna Leone APRN as PCP - General (Nurse Practitioner)    Reason For Visit:  F/U JUAN  Subjective           Review of Systems:    Pulm: No soa   CV:  No CP. GI: NO N/V/ANOREXIA.      Objective       amLODIPine 10 mg Oral Q24H   ceftriaxone 1 g Intravenous Q24H   DULoxetine 30 mg Oral Daily   heparin (porcine) 5,000 Units Subcutaneous Q8H   insulin detemir 30 Units Subcutaneous Q12H   insulin lispro 0-9 Units Subcutaneous 4x Daily With Meals & Nightly   insulin lispro 5 Units Subcutaneous TID With Meals   metoprolol tartrate 100 mg Oral Q12H   metroNIDAZOLE 500 mg Intravenous Q8H   micafungin (MYCAMINE)  mg Intravenous Q24H   saccharomyces boulardii 250 mg Oral BID   sevelamer 800 mg Oral TID With Meals   sodium bicarbonate 1,300 mg Oral BID   tamsulosin 0.8 mg Oral Nightly   terazosin 5 mg Oral Nightly            Vital Signs:  Blood pressure 165/94, pulse 85, temperature 97.9 °F (36.6 °C), temperature source Oral, resp. rate 18, height 190.5 cm (75\"), weight 113 kg (250 lb), SpO2 96 %.    Flowsheet Rows      First Filed Value   Admission Height  190.5 cm (75\") Documented at 07/30/2019 1623   Admission Weight  113 kg (250 lb) Documented at 07/30/2019 1623          08/09 0701 - 08/10 0700  In: 1140 [P.O.:840]  Out: 1600 [Urine:1600]    Physical Exam:    General Appearance: NAD, alert and cooperative, Ox3  Eyes: PER, conjunctivae and sclerae normal, no icterus  Lungs: respirations regular and unlabored, no crepitus, clear to auscultation  Heart/CV: regular rhythm & normal rate, no murmur, no gallop, no rub and TR edema  Abdomen: not distended, soft, non-tender, no masses,  bowel sounds present  Skin: No rash, Warm and dry    Radiology:            Labs:  Results from last 7 days   Lab Units 08/10/19  0703 08/09/19  1030 08/08/19  0738   WBC 10*3/mm3 12.73* 14.13* 13.61*   HEMOGLOBIN g/dL 10.6* 10.6* 10.9*   HEMATOCRIT % 33.2* 34.1* 35.1*   PLATELETS 10*3/mm3 242 258 246 "     Results from last 7 days   Lab Units 08/10/19  0703 08/09/19  0502 08/08/19  0738 08/07/19  0621  08/06/19  0905  08/04/19  0855   SODIUM mmol/L 137 138 138 137   < > 136   < > 133*   POTASSIUM mmol/L 5.1 5.0 5.2 5.3*   < > 5.6*   < > 4.9   CHLORIDE mmol/L 106 104 104 103   < > 106   < > 99   CO2 mmol/L 17.0* 17.0* 17.0* 16.0*   < > 13.0*   < > 20.0*   BUN mg/dL 66* 63* 61* 52*   < > 48*   < > 28*   CREATININE mg/dL 4.92* 5.29* 4.94* 5.47*   < > 4.68*   < > 3.01*   CALCIUM mg/dL 7.8* 7.7* 8.1* 7.8*   < > 8.0*   < > 7.9*   PHOSPHORUS mg/dL 8.2*  --   --   --   --   --   --   --    ALBUMIN g/dL 2.50*  --   --   --   --  2.40*  --  2.60*    < > = values in this interval not displayed.     Results from last 7 days   Lab Units 08/10/19  0703   GLUCOSE mg/dL 155*       Results from last 7 days   Lab Units 08/06/19  0905   ALK PHOS U/L 201*   BILIRUBIN mg/dL 0.3   ALT (SGPT) U/L 20   AST (SGOT) U/L 17                 Estimated Creatinine Clearance: 24.4 mL/min (A) (by C-G formula based on SCr of 4.92 mg/dL (H)).      Assessment       Sepsis (CMS/[facility])    DUNLAP Cirrhosis    Essential hypertension    Non-compliance    Hyperlipemia    Type 2 diabetes mellitus with circulatory disorder and uncontrolled    History of MRSA infection    JUAN (acute kidney injury) (CMS/[facility])    S/P BKA (below knee amputation), left (CMS/[facility])    Peripheral vascular disease (CMS/[facility])    DM (diabetes mellitus) type II uncontrolled, periph vascular disorder (CMS/[facility])    Abscess    UTI (urinary tract infection)    Hyponatremia            Impression: JUAN. SLIGHTLY IMPROVED GFR. ANEMIA. HYPERPHOSPHATEMIA.          Recommendations: START PO4 BINDER. IF ABLE TO GO HOME SOON THEN WE CAN F/U IN OUR [facility].      Brad Nash MD  08/10/19  1:47 PM

## 2019-08-10 NOTE — PROGRESS NOTES
[facility] Medicine Services  PROGRESS NOTE    Patient Name: Kendrick Crystal  : 1970  MRN: [id number]    Date of Admission: 2019  Length of Stay: 11  Primary Care Physician: Keyanna Leone APRN    Subjective   Subjective   CC:  Abscess     HPI:    Patient states he is doing well.  Sitting up on bedside commode in NAD. Positive BM x3.  No adverse events overnight.  No family in the room.    Review of Systems  Gen- No fevers, chills  CV- No chest pain, palpitations  Resp- No cough, dyspnea  GI- No N/V/D, abd pain  All 14 systems reviewed and the pertinent positives negative are listed above in the HPI.    Objective   Objective   Vital Signs:   Temp:  [97.1 °F (36.2 °C)-97.9 °F (36.6 °C)] 97.9 °F (36.6 °C)  Heart Rate:  [68-85] 85  Resp:  [16-18] 18  BP: (138-193)/() 165/94  Physical Exam:  Constitutional: Alert, WD, WN ill-appearing male in NAD  Eyes: left eye blindness  Head: NCAT  ENT: [place], moist mucous membranes   Respiratory: Non-labored, symmetrical chest expansion, CTAB  Cardiovascular: RRR, no M/R/G, +DP pulses bilaterally  Gastrointestinal: Soft, NT, ND +BS  Musculoskeletal: OLGUIN; no LE edema right LE; right BKA  Neurologic: Oriented x4, strength symmetric in all extremities, follows all commands, speech clear  Skin: No rashes on exposed skin  Psychiatric: Pleasant and cooperative; normal affect    Results Reviewed:  I have personally reviewed current lab, radiology, and data and agree.  Results from last 7 days   Lab Units 08/10/19  0703 19  1030 19  0738   WBC 10*3/mm3 12.73* 14.13* 13.61*   HEMOGLOBIN g/dL 10.6* 10.6* 10.9*   HEMATOCRIT % 33.2* 34.1* 35.1*   PLATELETS 10*3/mm3 242 258 246     Results from last 7 days   Lab Units 08/10/19  0703 19  0502 19  0738  19  0905  19  0855   SODIUM mmol/L 137 138 138   < > 136   < > 133*   POTASSIUM mmol/L 5.1 5.0 5.2   < > 5.6*   < > 4.9   CHLORIDE mmol/L 106 104 104   < > 106   < >  99   CO2 mmol/L 17.0* 17.0* 17.0*   < > 13.0*   < > 20.0*   BUN mg/dL 66* 63* 61*   < > 48*   < > 28*   CREATININE mg/dL 4.92* 5.29* 4.94*   < > 4.68*   < > 3.01*   GLUCOSE mg/dL 155* 115* 234*   < > 178*   < > 268*   CALCIUM mg/dL 7.8* 7.7* 8.1*   < > 8.0*   < > 7.9*   ALT (SGPT) U/L  --   --   --   --  20  --  26   AST (SGOT) U/L  --   --   --   --  17  --  23    < > = values in this interval not displayed.     Estimated Creatinine Clearance: 24.4 mL/min (A) (by C-G formula based on SCr of 4.92 mg/dL (H)).    Microbiology Results Abnormal     Procedure Component Value - Date/Time    Fecal Leukocytes - Stool, Per Rectum [[id number]]  (Normal) Collected:  08/10/19 0324    Lab Status:  Final result Specimen:  Stool from Per Rectum Updated:  08/10/19 0905     Fecal Leukocytes No WBC's Seen    Anaerobic Culture - Wound, Rectal Abscess [[id number]] Collected:  08/02/19 1620    Lab Status:  Final result Specimen:  Wound from Rectal Abscess Updated:  08/07/19 0914     Anaerobic Culture No anaerobes isolated at 5 days    Wound Culture - Wound, Rectal Abscess [[id number]]  (Abnormal)  (Susceptibility) Collected:  08/02/19 1620    Lab Status:  Final result Specimen:  Wound from Rectal Abscess Updated:  08/06/19 0922     Wound Culture Scant growth (1+) Escherichia coli      Scant growth (1+) Klebsiella pneumoniae ssp pneumoniae      Scant growth (1+) Normal Fecal Haily     Gram Stain Few (2+) WBCs seen      Occasional Yeast      Rare (1+) Gram negative bacilli    Narrative:       Specimen is an abscess - work up all GNRs per Dr. Dong ([facility] infection control).    Susceptibility      Escherichia coli     EYAD     Ampicillin Resistant     Ampicillin + Sulbactam Intermediate     Cefazolin Susceptible     Cefepime Susceptible     Ceftazidime Susceptible     Ceftriaxone Susceptible     Gentamicin Susceptible     Levofloxacin Resistant     Piperacillin + Tazobactam Susceptible     Tetracycline Susceptible     Trimethoprim +  Sulfamethoxazole Susceptible                Susceptibility      Klebsiella pneumoniae ssp pneumoniae     EYAD     Ampicillin Resistant     Ampicillin + Sulbactam Susceptible     Cefazolin Susceptible     Cefepime Susceptible     Ceftazidime Susceptible     Ceftriaxone Susceptible     Gentamicin Susceptible     Levofloxacin Susceptible     Piperacillin + Tazobactam Susceptible     Tetracycline Susceptible     Trimethoprim + Sulfamethoxazole Susceptible                    Blood Culture - Blood, Hand, Left [[id number]] Collected:  07/30/19 1845    Lab Status:  Final result Specimen:  Blood from Hand, Left Updated:  08/04/19 2145     Blood Culture No growth at 5 days    Blood Culture - Blood, Arm, Right [[id number]] Collected:  07/30/19 1840    Lab Status:  Final result Specimen:  Blood from Arm, Right Updated:  08/04/19 2145     Blood Culture No growth at 5 days    Urine Culture - Urine, Urine, Clean Catch [[id number]]  (Normal) Collected:  07/30/19 1725    Lab Status:  Final result Specimen:  Urine, Clean Catch Updated:  07/31/19 2019     Urine Culture No growth        Imaging Results (last 24 hours)     ** No results found for the last 24 hours. **        Results for orders placed during the hospital encounter of 05/18/19   Adult Transthoracic Echo Complete W/ Cont if Necessary Per Protocol    Narrative · Estimated EF = 60%.  · Mild mitral valve regurgitation is present  · Mild tricuspid valve regurgitation is present.  · Calculated right ventricular systolic pressure from tricuspid   regurgitation is 29 mmHg.        I have reviewed the medications:  Scheduled Meds:    amLODIPine 10 mg Oral Q24H   ceftriaxone 1 g Intravenous Q24H   clotrimazole 10 mg Oral TID   DULoxetine 30 mg Oral Daily   heparin (porcine) 5,000 Units Subcutaneous Q8H   insulin detemir 30 Units Subcutaneous Q12H   insulin lispro 0-9 Units Subcutaneous 4x Daily With Meals & Nightly   insulin lispro 5 Units Subcutaneous TID With Meals   metoprolol  "tartrate 100 mg Oral Q12H   metroNIDAZOLE 500 mg Intravenous Q8H   micafungin (MYCAMINE)  mg Intravenous Q24H   saccharomyces boulardii 250 mg Oral BID   sodium bicarbonate 1,300 mg Oral BID   tamsulosin 0.8 mg Oral Nightly   terazosin 5 mg Oral Nightly     Continuous Infusions:     PRN Meds:.dextrose  •  dextrose  •  glucagon (human recombinant)  •  HYDROcodone-acetaminophen  •  ondansetron **OR** ondansetron  •  sodium chloride    Assessment/Plan   Assessment / Plan     Active Hospital Problems    Diagnosis  POA   • **Sepsis (CMS/[facility]) [A41.9]  Yes   • Abscess [L02.91]  Yes   • UTI (urinary tract infection) [N39.0]  Yes   • Hyponatremia [E87.1]  Yes   • DM (diabetes mellitus) type II uncontrolled, periph vascular disorder (CMS/[facility]) [E11.51, E11.65]  Yes   • Peripheral vascular disease (CMS/[facility]) [I73.9]  Yes   • S/P BKA (below knee amputation), left (CMS/[facility]) [Z89.512]  Not Applicable   • JUAN (acute kidney injury) (CMS/[facility]) [N17.9]  Unknown   • History of MRSA infection [Z86.14]  Yes   • Type 2 diabetes mellitus with circulatory disorder and uncontrolled [E11.59]  Yes   • Essential hypertension [I10]  Yes   • Hyperlipemia [E78.5]  Yes   • DUNLAP Cirrhosis [K74.60]  Yes   • Non-compliance [Z91.14]  Not Applicable      Resolved Hospital Problems   No resolved problems to display.     Brief Hospital Course to date:  Kendrick Crystal is a 48 year old gentleman with uncontrolled diabetes who presents with sepsis secondary to abscess inferior to the prostate and anterior to the rectum.    Sepsis, claudia-rectal abscess, UTI   --CRS following  -pt met sepsis criteria based on elevated lactic acid, leukocytosis, tachycardia and positive source  -CT abd/pel obtained: \"3.0 x 3.2 cm fluid collection with enhancing margins consistent with an abscess related to the inferior aspect of the prostate gland and anterior to the distal rectum.\"  -abscess drainage done 8/2, cultures with e coli and klebsiella.  Continue ceftriaxone, " "flagyl, micafungin  --leukocytosis improving    JUAN  Metabolic acidosis  Hyperkalemia  --Cr 4.92; mildly improved  -Renal following.  Multifactorial.    -Remains on valtessa for K+  --AM labs    Hyponatremia  - improved, stable  --Na 137    Anemia  --hgb 10.6; stable  --AM labs     Uncontrolled T2DM, a1c>14%  -pt reports that he often \"forgets\" to take his routine medications; including his insulin and does not check his glucose daily  -blood sugar >600 on admission, but did not meet criteria for DKA, now improving with subQ insulin   --24H glucose      HTN  -labile, monitor   -have stopped lisinopril given JUAN  --monitor closely, norvasc increased 8/6     DVT prophylaxis:  SCD- right leg, heparin       Disposition: I expect the patient to be discharged home TBD    CODE STATUS:   Code Status and Medical Interventions:   Ordered at: 07/30/19 2051     Code Status:    CPR     Medical Interventions (Level of Support Prior to Arrest):    Full     Electronically signed by CHINTAN Amador, 08/10/19, 10:29 AM.      "

## 2019-08-11 LAB
ALBUMIN SERPL-MCNC: 2.5 G/DL (ref 3.5–5.2)
ANION GAP SERPL CALCULATED.3IONS-SCNC: 16 MMOL/L (ref 5–15)
BUN BLD-MCNC: 66 MG/DL (ref 6–20)
BUN/CREAT SERPL: 14.7 (ref 7–25)
CALCIUM SPEC-SCNC: 8 MG/DL (ref 8.6–10.5)
CHLORIDE SERPL-SCNC: 105 MMOL/L (ref 98–107)
CO2 SERPL-SCNC: 20 MMOL/L (ref 22–29)
CREAT BLD-MCNC: 4.48 MG/DL (ref 0.76–1.27)
DEPRECATED RDW RBC AUTO: 50.4 FL (ref 37–54)
ERYTHROCYTE [DISTWIDTH] IN BLOOD BY AUTOMATED COUNT: 16.4 % (ref 12.3–15.4)
GFR SERPL CREATININE-BSD FRML MDRD: 14 ML/MIN/1.73
GFR SERPL CREATININE-BSD FRML MDRD: ABNORMAL ML/MIN/1.73
GLUCOSE BLD-MCNC: 103 MG/DL (ref 65–99)
GLUCOSE BLDC GLUCOMTR-MCNC: 102 MG/DL (ref 70–130)
GLUCOSE BLDC GLUCOMTR-MCNC: 113 MG/DL (ref 70–130)
GLUCOSE BLDC GLUCOMTR-MCNC: 115 MG/DL (ref 70–130)
GLUCOSE BLDC GLUCOMTR-MCNC: 138 MG/DL (ref 70–130)
GLUCOSE BLDC GLUCOMTR-MCNC: 171 MG/DL (ref 70–130)
HCT VFR BLD AUTO: 33.5 % (ref 37.5–51)
HGB BLD-MCNC: 10.6 G/DL (ref 13–17.7)
MCH RBC QN AUTO: 27 PG (ref 26.6–33)
MCHC RBC AUTO-ENTMCNC: 31.6 G/DL (ref 31.5–35.7)
MCV RBC AUTO: 85.5 FL (ref 79–97)
PHOSPHATE SERPL-MCNC: 7.9 MG/DL (ref 2.5–4.5)
PLATELET # BLD AUTO: 248 10*3/MM3 (ref 140–450)
PMV BLD AUTO: 11.3 FL (ref 6–12)
POTASSIUM BLD-SCNC: 4.5 MMOL/L (ref 3.5–5.2)
RBC # BLD AUTO: 3.92 10*6/MM3 (ref 4.14–5.8)
SODIUM BLD-SCNC: 141 MMOL/L (ref 136–145)
WBC NRBC COR # BLD: 11.25 10*3/MM3 (ref 3.4–10.8)

## 2019-08-11 PROCEDURE — 99232 SBSQ HOSP IP/OBS MODERATE 35: CPT | Performed by: NURSE PRACTITIONER

## 2019-08-11 PROCEDURE — 25010000002 HEPARIN (PORCINE) PER 1000 UNITS: Performed by: INTERNAL MEDICINE

## 2019-08-11 PROCEDURE — 80069 RENAL FUNCTION PANEL: CPT | Performed by: INTERNAL MEDICINE

## 2019-08-11 PROCEDURE — 25010000002 CEFTRIAXONE PER 250 MG: Performed by: INTERNAL MEDICINE

## 2019-08-11 PROCEDURE — 63710000001 INSULIN DETEMIR PER 5 UNITS: Performed by: INTERNAL MEDICINE

## 2019-08-11 PROCEDURE — 82962 GLUCOSE BLOOD TEST: CPT

## 2019-08-11 PROCEDURE — 25010000003 MICAFUNGIN SODIUM 100 MG RECONSTITUTED SOLUTION: Performed by: INTERNAL MEDICINE

## 2019-08-11 PROCEDURE — 85027 COMPLETE CBC AUTOMATED: CPT | Performed by: NURSE PRACTITIONER

## 2019-08-11 RX ORDER — CARVEDILOL 12.5 MG/1
25 TABLET ORAL 2 TIMES DAILY WITH MEALS
Status: DISCONTINUED | OUTPATIENT
Start: 2019-08-11 | End: 2019-08-12

## 2019-08-11 RX ADMIN — METRONIDAZOLE 500 MG: 500 INJECTION, SOLUTION INTRAVENOUS at 10:18

## 2019-08-11 RX ADMIN — SODIUM BICARBONATE 650 MG TABLET 1300 MG: at 10:14

## 2019-08-11 RX ADMIN — CARVEDILOL 25 MG: 12.5 TABLET, FILM COATED ORAL at 17:12

## 2019-08-11 RX ADMIN — TAMSULOSIN HYDROCHLORIDE 0.8 MG: 0.4 CAPSULE ORAL at 22:16

## 2019-08-11 RX ADMIN — SEVELAMER CARBONATE 0.8 G: 0.8 POWDER, FOR SUSPENSION ORAL at 10:16

## 2019-08-11 RX ADMIN — HYDROCODONE BITARTRATE AND ACETAMINOPHEN 1 TABLET: 10; 325 TABLET ORAL at 22:17

## 2019-08-11 RX ADMIN — AMLODIPINE BESYLATE 10 MG: 10 TABLET ORAL at 10:14

## 2019-08-11 RX ADMIN — MICAFUNGIN SODIUM 100 MG: 20 INJECTION, POWDER, LYOPHILIZED, FOR SOLUTION INTRAVENOUS at 14:19

## 2019-08-11 RX ADMIN — TERAZOSIN HYDROCHLORIDE ANHYDROUS 5 MG: 5 CAPSULE ORAL at 22:18

## 2019-08-11 RX ADMIN — INSULIN LISPRO 2 UNITS: 100 INJECTION, SOLUTION INTRAVENOUS; SUBCUTANEOUS at 22:19

## 2019-08-11 RX ADMIN — CARVEDILOL 25 MG: 12.5 TABLET, FILM COATED ORAL at 10:17

## 2019-08-11 RX ADMIN — CEFTRIAXONE SODIUM 1 G: 1 INJECTION, POWDER, FOR SOLUTION INTRAMUSCULAR; INTRAVENOUS at 16:15

## 2019-08-11 RX ADMIN — DULOXETINE HYDROCHLORIDE 30 MG: 30 CAPSULE, DELAYED RELEASE ORAL at 10:17

## 2019-08-11 RX ADMIN — SEVELAMER CARBONATE 0.8 G: 0.8 POWDER, FOR SUSPENSION ORAL at 17:12

## 2019-08-11 RX ADMIN — METRONIDAZOLE 500 MG: 500 INJECTION, SOLUTION INTRAVENOUS at 17:11

## 2019-08-11 RX ADMIN — HEPARIN SODIUM 5000 UNITS: 5000 INJECTION INTRAVENOUS; SUBCUTANEOUS at 22:17

## 2019-08-11 RX ADMIN — INSULIN DETEMIR 30 UNITS: 100 INJECTION, SOLUTION SUBCUTANEOUS at 10:16

## 2019-08-11 RX ADMIN — HEPARIN SODIUM 5000 UNITS: 5000 INJECTION INTRAVENOUS; SUBCUTANEOUS at 06:30

## 2019-08-11 RX ADMIN — METRONIDAZOLE 500 MG: 500 INJECTION, SOLUTION INTRAVENOUS at 00:24

## 2019-08-11 RX ADMIN — SODIUM BICARBONATE 650 MG TABLET 1300 MG: at 22:16

## 2019-08-11 RX ADMIN — SEVELAMER CARBONATE 0.8 G: 0.8 POWDER, FOR SUSPENSION ORAL at 12:38

## 2019-08-11 RX ADMIN — Medication 250 MG: at 22:16

## 2019-08-11 RX ADMIN — Medication 250 MG: at 10:17

## 2019-08-11 RX ADMIN — HEPARIN SODIUM 5000 UNITS: 5000 INJECTION INTRAVENOUS; SUBCUTANEOUS at 14:18

## 2019-08-11 RX ADMIN — INSULIN DETEMIR 30 UNITS: 100 INJECTION, SOLUTION SUBCUTANEOUS at 22:23

## 2019-08-11 NOTE — PROGRESS NOTES
[facility] Medicine Services  PROGRESS NOTE    Patient Name: Kendrick Crystal  : 1970  MRN: [id number]    Date of Admission: 2019  Length of Stay: 12  Primary Care Physician: Keyanna Leone APRN    Subjective   Subjective   CC:  Perirectal Abscess     HPI:    Patient was lying in bed in NAD.  Wife is in the room.  Patient has no complaints at this time.  Wife is wondering when patient will be discharged.  Discussed patient's downtrending creatinine and that he will have to follow-up with [facility] in [place].  Both patient and his wife are in agreement with that.  Patient hoping to be discharged tomorrow.  No adverse events overnight.  Positive BM.    Review of Systems  Gen- No fevers, chills  CV- No chest pain, palpitations  Resp- No cough, dyspnea  GI- No N/V/D, abd pain  All 14 systems reviewed and the pertinent positives negative are listed above in the HPI.    Objective   Objective   Vital Signs:   Temp:  [97.4 °F (36.3 °C)-98.2 °F (36.8 °C)] 97.7 °F (36.5 °C)  Heart Rate:  [70-76] 72  Resp:  [16-18] 16  BP: (161-190)/() 169/84  Physical Exam:  Constitutional: Alert, WD, WN ill-appearing male in NAD  Eyes: left eye blindness  Head: NCAT  ENT: [place], moist mucous membranes   Respiratory: Non-labored, symmetrical chest expansion, CTAB  Cardiovascular: RRR, no M/R/G, +DP pulses bilaterally  Gastrointestinal: Soft, NT, ND +BS  Musculoskeletal: OLGUIN; no LE edema right LE; right BKA  Neurologic: Oriented x4, strength symmetric in all extremities, follows all commands, speech clear  Skin: No rashes on exposed skin  Psychiatric: Pleasant and cooperative; normal affect    Results Reviewed:  I have personally reviewed current lab, radiology, and data and agree.  Results from last 7 days   Lab Units 19  0603 08/10/19  0703 19  1030   WBC 10*3/mm3 11.25* 12.73* 14.13*   HEMOGLOBIN g/dL 10.6* 10.6* 10.6*   HEMATOCRIT % 33.5* 33.2* 34.1*   PLATELETS 10*3/mm3 248 242 258      Results from last 7 days   Lab Units 08/11/19  0603 08/10/19  0703 08/09/19  0502  08/06/19  0905  08/04/19  0855   SODIUM mmol/L 141 137 138   < > 136   < > 133*   POTASSIUM mmol/L 4.5 5.1 5.0   < > 5.6*   < > 4.9   CHLORIDE mmol/L 105 106 104   < > 106   < > 99   CO2 mmol/L 20.0* 17.0* 17.0*   < > 13.0*   < > 20.0*   BUN mg/dL 66* 66* 63*   < > 48*   < > 28*   CREATININE mg/dL 4.48* 4.92* 5.29*   < > 4.68*   < > 3.01*   GLUCOSE mg/dL 103* 155* 115*   < > 178*   < > 268*   CALCIUM mg/dL 8.0* 7.8* 7.7*   < > 8.0*   < > 7.9*   ALT (SGPT) U/L  --   --   --   --  20  --  26   AST (SGOT) U/L  --   --   --   --  17  --  23    < > = values in this interval not displayed.     Estimated Creatinine Clearance: 26.8 mL/min (A) (by C-G formula based on SCr of 4.48 mg/dL (H)).    Microbiology Results Abnormal     Procedure Component Value - Date/Time    Fecal Leukocytes - Stool, Per Rectum [[id number]]  (Normal) Collected:  08/10/19 0324    Lab Status:  Final result Specimen:  Stool from Per Rectum Updated:  08/10/19 0905     Fecal Leukocytes No WBC's Seen    Anaerobic Culture - Wound, Rectal Abscess [[id number]] Collected:  08/02/19 1620    Lab Status:  Final result Specimen:  Wound from Rectal Abscess Updated:  08/07/19 0914     Anaerobic Culture No anaerobes isolated at 5 days    Wound Culture - Wound, Rectal Abscess [[id number]]  (Abnormal)  (Susceptibility) Collected:  08/02/19 1620    Lab Status:  Final result Specimen:  Wound from Rectal Abscess Updated:  08/06/19 0922     Wound Culture Scant growth (1+) Escherichia coli      Scant growth (1+) Klebsiella pneumoniae ssp pneumoniae      Scant growth (1+) Normal Fecal Haily     Gram Stain Few (2+) WBCs seen      Occasional Yeast      Rare (1+) Gram negative bacilli    Narrative:       Specimen is an abscess - work up all GNRs per Dr. Dong ([facility] infection control).    Susceptibility      Escherichia coli     EYAD     Ampicillin Resistant     Ampicillin + Sulbactam  Intermediate     Cefazolin Susceptible     Cefepime Susceptible     Ceftazidime Susceptible     Ceftriaxone Susceptible     Gentamicin Susceptible     Levofloxacin Resistant     Piperacillin + Tazobactam Susceptible     Tetracycline Susceptible     Trimethoprim + Sulfamethoxazole Susceptible                Susceptibility      Klebsiella pneumoniae ssp pneumoniae     EYAD     Ampicillin Resistant     Ampicillin + Sulbactam Susceptible     Cefazolin Susceptible     Cefepime Susceptible     Ceftazidime Susceptible     Ceftriaxone Susceptible     Gentamicin Susceptible     Levofloxacin Susceptible     Piperacillin + Tazobactam Susceptible     Tetracycline Susceptible     Trimethoprim + Sulfamethoxazole Susceptible                    Blood Culture - Blood, Hand, Left [[id number]] Collected:  07/30/19 1845    Lab Status:  Final result Specimen:  Blood from Hand, Left Updated:  08/04/19 2145     Blood Culture No growth at 5 days    Blood Culture - Blood, Arm, Right [[id number]] Collected:  07/30/19 1840    Lab Status:  Final result Specimen:  Blood from Arm, Right Updated:  08/04/19 2145     Blood Culture No growth at 5 days    Urine Culture - Urine, Urine, Clean Catch [[id number]]  (Normal) Collected:  07/30/19 1725    Lab Status:  Final result Specimen:  Urine, Clean Catch Updated:  07/31/19 2019     Urine Culture No growth        Imaging Results (last 24 hours)     ** No results found for the last 24 hours. **        Results for orders placed during the hospital encounter of 05/18/19   Adult Transthoracic Echo Complete W/ Cont if Necessary Per Protocol    Narrative · Estimated EF = 60%.  · Mild mitral valve regurgitation is present  · Mild tricuspid valve regurgitation is present.  · Calculated right ventricular systolic pressure from tricuspid   regurgitation is 29 mmHg.        I have reviewed the medications:  Scheduled Meds:    amLODIPine 10 mg Oral Q24H   carvedilol 25 mg Oral BID With Meals   ceftriaxone 1 g  Intravenous Q24H   DULoxetine 30 mg Oral Daily   heparin (porcine) 5,000 Units Subcutaneous Q8H   insulin detemir 30 Units Subcutaneous Q12H   insulin lispro 0-9 Units Subcutaneous 4x Daily With Meals & Nightly   insulin lispro 5 Units Subcutaneous TID With Meals   metroNIDAZOLE 500 mg Intravenous Q8H   micafungin (MYCAMINE)  mg Intravenous Q24H   saccharomyces boulardii 250 mg Oral BID   sevelamer 800 mg Oral TID With Meals   sodium bicarbonate 1,300 mg Oral BID   tamsulosin 0.8 mg Oral Nightly   terazosin 5 mg Oral Nightly     Continuous Infusions:     PRN Meds:.dextrose  •  dextrose  •  glucagon (human recombinant)  •  HYDROcodone-acetaminophen  •  ondansetron **OR** ondansetron  •  sodium chloride    Assessment/Plan   Assessment / Plan     Active Hospital Problems    Diagnosis  POA   • **Sepsis (CMS/[facility]) [A41.9]  Yes   • Abscess [L02.91]  Yes   • UTI (urinary tract infection) [N39.0]  Yes   • Hyponatremia [E87.1]  Yes   • DM (diabetes mellitus) type II uncontrolled, periph vascular disorder (CMS/[facility]) [E11.51, E11.65]  Yes   • Peripheral vascular disease (CMS/[facility]) [I73.9]  Yes   • S/P BKA (below knee amputation), left (CMS/[facility]) [Z89.512]  Not Applicable   • JUAN (acute kidney injury) (CMS/[facility]) [N17.9]  Unknown   • History of MRSA infection [Z86.14]  Yes   • Type 2 diabetes mellitus with circulatory disorder and uncontrolled [E11.59]  Yes   • Essential hypertension [I10]  Yes   • Hyperlipemia [E78.5]  Yes   • DUNLAP Cirrhosis [K74.60]  Yes   • Non-compliance [Z91.14]  Not Applicable      Resolved Hospital Problems   No resolved problems to display.     Brief Hospital Course to date:  Kendrick Crystal is a 48 year old gentleman with uncontrolled diabetes who presents with sepsis secondary to abscess inferior to the prostate and anterior to the rectum.    Sepsis, claudia-rectal abscess, UTI   --CRS following  -pt met sepsis criteria based on elevated lactic acid, leukocytosis, tachycardia and positive source  -CT  "abd/pel obtained: \"3.0 x 3.2 cm fluid collection with enhancing margins consistent with an abscess related to the inferior aspect of the prostate gland and anterior to the distal rectum.\"  -abscess drainage done 8/2, cultures with e coli and klebsiella.  Continue ceftriaxone, flagyl, micafungin  --leukocytosis improving; WBC 11.25  --Continue current abx tx per ID    JUAN  Metabolic acidosis  Hyperkalemia  --Cr 4.92; mildly improved  -Renal following.  Multifactorial.    -Remains on valtessa for K+  --Nephrology added PO4 binder  --Follow up with [facility] in [place] office  --AM labs    Hyponatremia  - improved, stable  --Na 141    Anemia  --hgb 10.6; stable  --AM labs     Uncontrolled T2DM, a1c>14%  -pt reports that he often \"forgets\" to take his routine medications; including his insulin and does not check his glucose daily  -blood sugar >600 on admission, but did not meet criteria for DKA, now improving with subQ insulin   --24H glucose 102-186     HTN  -labile, monitor   -have stopped lisinopril given JUAN  --monitor closely, norvasc increased 8/6  --Dc'd metoprolol; started Coreg; has more of a blood pressure effectg     DVT prophylaxis:  SCD- right leg, heparin       Disposition: I expect the patient to be discharged home tomorrow if Cr and BP improve and F/U with [facility] in [place] office is arranged; family stated they could make it there.    CODE STATUS:   Code Status and Medical Interventions:   Ordered at: 07/30/19 2051     Code Status:    CPR     Medical Interventions (Level of Support Prior to Arrest):    Full     Electronically signed by CHINTAN Amador, 08/11/19, 8:41 AM.      "

## 2019-08-11 NOTE — PLAN OF CARE
Problem: Patient Care Overview  Goal: Plan of Care Review  Outcome: Ongoing (interventions implemented as appropriate)   08/11/19 1525   Coping/Psychosocial   Plan of Care Reviewed With patient;spouse   Plan of Care Review   Progress improving   OTHER   Outcome Summary pt. sleeping on and off throught day, poor po intake this date, no c/o pain , wife at bedside,        Problem: Skin Injury Risk (Adult)  Goal: Skin Health and Integrity  Outcome: Ongoing (interventions implemented as appropriate)   08/11/19 1525   Skin Injury Risk (Adult)   Skin Health and Integrity making progress toward outcome       Problem: Fall Risk (Adult)  Goal: Absence of Fall   08/11/19 1525   Fall Risk (Adult)   Absence of Fall making progress toward outcome       Problem: Pain, Acute (Adult)  Goal: Acceptable Pain Control/Comfort Level  Outcome: Ongoing (interventions implemented as appropriate)   08/11/19 1525   Pain, Acute (Adult)   Acceptable Pain Control/Comfort Level making progress toward outcome      08/11/19 1525   Pain, Acute (Adult)   Acceptable Pain Control/Comfort Level making progress toward outcome

## 2019-08-11 NOTE — PROGRESS NOTES
[facility] Progress Note        Antibiotics:  Anti-Infectives (From admission, onward)    Ordered     Dose/Rate Route Frequency Start Stop    08/09/19 1224  micafungin 100 mg/100 mL 0.9% NS IVPB (mbp)     Ordering Provider:  Sandra White MD    100 mg  over 60 Minutes Intravenous Every 24 Hours 08/09/19 1400 08/19/19 1359    08/06/19 1426  cefTRIAXone (ROCEPHIN) 1 g/100 mL 0.9% NS (MBP)     Ordering Provider:  Shahbaz Valdivia MD    1 g  over 30 Minutes Intravenous Every 24 Hours 08/06/19 1600 08/20/19 1559    08/06/19 1426  metroNIDAZOLE (FLAGYL) 500 mg/100mL IVPB     Ordering Provider:  Shahbaz Valdivia MD    500 mg  over 60 Minutes Intravenous Every 8 Hours 08/06/19 1600 08/20/19 1559    07/31/19 0955  micafungin 100 mg/100 mL 0.9% NS IVPB (mbp)     Ordering Provider:  Usman Dong MD    100 mg  over 60 Minutes Intravenous Every 24 Hours 07/31/19 1100 08/09/19 1328    07/30/19 2051  [MAR Hold]  piperacillin-tazobactam (ZOSYN) 3.375 g in iso-osmotic dextrose 50 ml (premix)     (MAR Hold since 08/02/19 1447)   Comments:  First dose given in ED.   Ordering Provider:  Keyanna Aponte APRN    3.375 g  over 4 Hours Intravenous Every 8 Hours 07/31/19 0100 08/03/19 0059    07/30/19 1820  vancomycin 2250 mg/500 mL 0.9% NS IVPB ([facility])     Ordering Provider:  Kendrick Sotelo MD    20 mg/kg × 113 kg  over 3 Hours Intravenous Once 07/30/19 1822 07/30/19 2155    07/30/19 1820  piperacillin-tazobactam (ZOSYN) 3.375 g in iso-osmotic dextrose 50 ml (premix)     Ordering Provider:  Kendrick Sotelo MD    3.375 g  over 30 Minutes Intravenous Once 07/30/19 1822 07/30/19 1924          CC: rectal pain    HPI:    Patient is a 48 y.o.  Yr old male with history of poorly contolled T2DM, DUNLAP/liver cirrhosis, HTN, PVD/Left BKA, and multiple skin abscesses/MRSA who presented to [facility] ED with right shin wound infection summer 2018.  He was unable to get to see Dr. Martinez as his father passed away unexpectedly on 7/25/18 and he  had to wait until after the .  The wound worsened becoming gangrenous and he came to [facility] ED in 2018.  He also had been hospitalized at [facility] and then transferred to  for a severe penis/scrotum infection requiring surgical debridement in summer 2018.  He received antibiotics regarding his right leg infection, generally improved over the remainder of summer 2018 but that area had not completely healed. He was admitted 2019 with acute left lower abdominal wall redness/swelling and pain, spontaneous drainage associated with fever/chills and uncontrolled blood sugars.   I&D requiring wide debridement , severe/deep infection and transferred to [facility] on May 3 with IV Zosyn/oral doxycycline. Cultures had lactobacillus and mixed gram-positive skin sherly including alpha strep, staph epidermidis and corynebacterium. Readmitted to [facility] May 18, 2019, increasing generalized edema ultimately transitioned to oral doxycycline and healed.     He presented to the emergency room 2019 with acute rectal pain that had begun ; this is associated with constipation for days, chills and nausea/dry heaving.  White blood cell count elevated, high hemoglobin A1c over 13, urinalysis with hematuria/pyuria and CT scan with 3 x 3 cm fluid collection anterior to the distal rectum and per discussion with Dr. Akbar/Jennifer, this is not in the prostate.  Colorectal surgery/urology saw him for consideration of surgical options/timing/threshold, etc..     19 I&Dper Dr Payne perirectal abscess; patient reports that he had instrumented his rectum prior to hospitalization with enema    8/3/19 urinary retention overnight requiring in/out catheterization per patient.  Creat climb    19 further creat climb; childs placed and lisinopril stopped and d/w Dr Rubio     in retrospect he remembers air in his urine at admit which has raised concern for possible  "fistula from urinary tract;  No fecaluria and culture from abscess is fecal sherly;  ?rectal-urethral fistula;  Dr Payne aware     8/7/19  He reports fatigue and some perirectal pain but not progressive , currently dull, nonradiating, better with pain meds, 3 out of 10 at present.  He denies hematochezia melena or hematemesis.  No diarrhea.  Recent Constipation as above.  No  hematuria or pyuria.      8/9: Patient is alert and awake upon arrival.  States he is tolerating his antibiotic.  He states that his pain is much better and rates his pain is a 1 of 10 this morning he has been afebrile and hemodynamically stable overnight. Stools are now more frequent and are associated with some urgency but still formed  He had cdiff colitis last year at [facility] and at that time he had liquid stools.  He denies abdominal cramping. Walker has been removed BUN and creatinine remain elevated and are worsening.  No new microbiology results today.  No new radiological reports today.    8/11: Patient laying in bed resting with eyes closed upon arrival.  He was easy to arouse.  States that he feels better today he is in no pain.  He has been afebrile with hypertensive overnight.  BUN and creatinine show some mild improvement today but remains elevated.  Leukocytosis has some mild improvement as well but it is elevated also.  No new radiological or microbiology reports.  Stool frequency decreased           PE:   /99 (BP Location: Right arm, Patient Position: Lying)   Pulse 70   Temp 98 °F (36.7 °C) (Oral)   Resp 16   Ht 190.5 cm (75\")   Wt 113 kg (250 lb)   SpO2 95%   BMI 31.25 kg/m²       GENERAL: sleepy, in no acute distress.   HEENT: Normocephalic, atraumatic. No icterus. Oropharynx clear without evidence of thrush or exudate. No evidence of peridontal disease.    NECK: Supple without nuchal rigidity. No mass.  LYMPH: No cervical, axillary lymphadenopathy.  HEART: RRR; No murmur, rubs, gallops.   LUNGS: Clear to auscultation " bilaterally without wheezing, rales, rhonchi. Normal respiratory effort. Nonlabored. No dullness.  States some shortness of breath.  ABDOMEN: Abdomen is firm with positive bowel sounds, no pain with palpation  EXT:  No cyanosis, clubbing or edema. No cord.  :   Slight candidiasis  MSK: FROM without joint effusions noted arms/legs.    SKIN: Warm and dry without cutaneous eruptions on Inspection/palpation.    NEURO: sleepy, is oriented x4     No peripheral stigmata/phenomena of endocarditis           Laboratory Data    Results from last 7 days   Lab Units 08/10/19  0703 08/09/19  1030 08/08/19  0738   WBC 10*3/mm3 12.73* 14.13* 13.61*   HEMOGLOBIN g/dL 10.6* 10.6* 10.9*   HEMATOCRIT % 33.2* 34.1* 35.1*   PLATELETS 10*3/mm3 242 258 246     Results from last 7 days   Lab Units 08/10/19  0703   SODIUM mmol/L 137   POTASSIUM mmol/L 5.1   CHLORIDE mmol/L 106   CO2 mmol/L 17.0*   BUN mg/dL 66*   CREATININE mg/dL 4.92*   GLUCOSE mg/dL 155*   CALCIUM mg/dL 7.8*     Results from last 7 days   Lab Units 08/06/19  0905   ALK PHOS U/L 201*   BILIRUBIN mg/dL 0.3   ALT (SGPT) U/L 20   AST (SGOT) U/L 17               Estimated Creatinine Clearance: 24.4 mL/min (A) (by C-G formula based on SCr of 4.92 mg/dL (H)).      Microbiology:      Radiology:  Imaging Results (last 72 hours)     Procedure Component Value Units Date/Time    CT Abdomen Pelvis With Contrast [[id number]] Collected:  07/30/19 1749     Updated:  07/31/19 1034    Narrative:       EXAMINATION: CT ABDOMEN PELVIS W CONTRAST- 07/30/2019      INDICATION: lower abd pain     TECHNIQUE: CT scan of the abdomen and pelvis was performed with  intravenous contrast.     The radiation dose reduction device was turned on for each scan per the  ALARA (As Low as Reasonably Achievable) protocol.     COMPARISON: 05/30/2019     FINDINGS: The most superior images demonstrate no basilar inflammatory  inflammatory process or pleural effusion. Small effusions have resolved  since previous  examination. The liver and spleen are normal. There are  small bilateral fat-containing adrenal nodules consistent bilateral  myolipomas, unchanged. The pancreas is normal. There is no renal mass,  stone or obstruction. There is no ascites,  aneurysm or retroperitoneal  lymphadenopathy. There is diffuse thickening of the bladder wall,  probably related to the peroneal abscess. There is no inguinal  lymphadenopathy. There are sigmoid diverticula without features of  diverticulitis. The appendix is normal.     Just inferior to the prostate gland and anterior to the distal rectum  there is a 3.0 x 3.2 cm fluid collection with enhancing margins  consistent with an abscess.       Impression:       There is a 3.0 x 3.2 cm fluid collection with enhancing  margins consistent with an abscess related to the inferior aspect of the  prostate gland and anterior to the distal rectum.     D:  07/30/2019  E:  07/31/2019     This report was finalized on 7/31/2019 10:31 AM by Dr. Guillermo Akbar MD.               Impression:     --Acute sepsis per internal medicine admission note, concern for abscess in the perirectal tissue as etiology.  Empiric antibiotics ongoing with risk for mixed infection.  drainage 8/2; culture data pending.  Broad-spectrum antimicrobials ongoing.     --Acute perirectal abscess associated with constipation as above, air in urine as above.  Colorectal surgery, Dr. Payne following to help guide timing/options/threshold for further drainage if needed.  Drainage as above on August 2 ;  Mixed Fecal sherly in culture     --History urinary retention.    Recurrent retention overnight August 2-August 3.  Medicine following to help guide further work-up, urology input, etc.    --ARF 8/3-8/7;  ?obstruction initially associated with retention postop (1200 out with I/O cath per nursing ) -v- contrast from CT -v- lisinopril/medication/vancomycin -v- relative hypotension -v- likely combination of factors with ATN; nephrology  following;  R/o recurrent urinary retention    --Cutaneous candidiasis and yeast in gram stain of abscess.  Mycamine added     --History MRSA;  Not in current culture     --Diabetes mellitus type 2, uncontrolled.  He reports the reason for this is forgetfulness     --History [place] and chronic liver disease per past notes     --Left BKA     --Peripheral vascular disease    -- History of Cdiff colitis 2018    --hyperkalemia per medicine/nephrology        PLAN:    --IV rocephin/flagyl/ Mycamine  When ready for discharge cefuroxime 500mg daily and flagyl 500mg bid x 1 week would be a reasonable regimen    -- Will ask for bid PVRs which will be recorded in the flow sheet     --Check/review labs cultures and scans     --History per nursing staff    -- monitor for diarrhea     --Discussed with Dr Garcia 8/9     --Highly complex set of issues with high risk for further serious morbidity and other serious sequela    --nephrology following; creat slightly improved over the last 2 days    Discussed with RN                Gilbert Olea, APRN  8/11/2019

## 2019-08-11 NOTE — PLAN OF CARE
Problem: Patient Care Overview  Goal: Plan of Care Review  Outcome: Ongoing (interventions implemented as appropriate)   08/11/19 0330 08/11/19 0338   Coping/Psychosocial   Plan of Care Reviewed With patient;spouse --    Plan of Care Review   Progress no change --    OTHER   Outcome Summary --  pt rested intermittently throughout night, having episodes of bladder incontinence and is very concerned about this, very solemn/somber mood throughout night, no c/o pain, no c/o n/v/d, hypertensive otherwise vss (APRN Keyanna Aponte notified) will continue to monitor.     Goal: Individualization and Mutuality  Outcome: Ongoing (interventions implemented as appropriate)    Goal: Discharge Needs Assessment  Outcome: Ongoing (interventions implemented as appropriate)      Problem: Skin Injury Risk (Adult)  Goal: Identify Related Risk Factors and Signs and Symptoms  Outcome: Outcome(s) achieved Date Met: 08/11/19    Goal: Skin Health and Integrity  Outcome: Ongoing (interventions implemented as appropriate)      Problem: Fall Risk (Adult)  Goal: Identify Related Risk Factors and Signs and Symptoms  Outcome: Outcome(s) achieved Date Met: 08/11/19    Goal: Absence of Fall  Outcome: Ongoing (interventions implemented as appropriate)      Problem: Sepsis/Septic Shock (Adult)  Goal: Signs and Symptoms of Listed Potential Problems Will be Absent, Minimized or Managed (Sepsis/Septic Shock)  Outcome: Outcome(s) achieved Date Met: 08/11/19      Problem: Diabetes, Type 2 (Adult)  Goal: Signs and Symptoms of Listed Potential Problems Will be Absent, Minimized or Managed (Diabetes, Type 2)  Outcome: Outcome(s) achieved Date Met: 08/11/19      Problem: Pain, Acute (Adult)  Goal: Identify Related Risk Factors and Signs and Symptoms  Outcome: Outcome(s) achieved Date Met: 08/11/19    Goal: Acceptable Pain Control/Comfort Level  Outcome: Ongoing (interventions implemented as appropriate)

## 2019-08-11 NOTE — PROGRESS NOTES
"   LOS: 12 days    Patient Care Team:  Keyanna Leone APRN as PCP - General (Nurse Practitioner)    Reason For Visit:  F/U JUAN  Subjective           Review of Systems:    Pulm: No soa   CV:  No CP. GI: NO N/V/D/ANOREXIA      Objective       amLODIPine 10 mg Oral Q24H   carvedilol 25 mg Oral BID With Meals   ceftriaxone 1 g Intravenous Q24H   DULoxetine 30 mg Oral Daily   heparin (porcine) 5,000 Units Subcutaneous Q8H   insulin detemir 30 Units Subcutaneous Q12H   insulin lispro 0-9 Units Subcutaneous 4x Daily With Meals & Nightly   insulin lispro 5 Units Subcutaneous TID With Meals   metroNIDAZOLE 500 mg Intravenous Q8H   micafungin (MYCAMINE)  mg Intravenous Q24H   saccharomyces boulardii 250 mg Oral BID   sevelamer 800 mg Oral TID With Meals   sodium bicarbonate 1,300 mg Oral BID   tamsulosin 0.8 mg Oral Nightly   terazosin 5 mg Oral Nightly            Vital Signs:  Blood pressure 160/84, pulse 75, temperature 97.3 °F (36.3 °C), temperature source Oral, resp. rate 16, height 190.5 cm (75\"), weight 113 kg (250 lb), SpO2 95 %.    Flowsheet Rows      First Filed Value   Admission Height  190.5 cm (75\") Documented at 07/30/2019 1623   Admission Weight  113 kg (250 lb) Documented at 07/30/2019 1623          08/10 0701 - 08/11 0700  In: 1020 [P.O.:720]  Out: -     Physical Exam:    General Appearance: NAD, alert and cooperative, Ox3  Eyes: PER, conjunctivae and sclerae normal, no icterus  Lungs: respirations regular and unlabored, no crepitus, clear to auscultation  Heart/CV: regular rhythm & normal rate, no murmur, no gallop, no rub and no edema  Abdomen: not distended, soft, non-tender, no masses,  bowel sounds present  Skin: No rash, Warm and dry    Radiology:            Labs:  Results from last 7 days   Lab Units 08/11/19  0603 08/10/19  0703 08/09/19  1030   WBC 10*3/mm3 11.25* 12.73* 14.13*   HEMOGLOBIN g/dL 10.6* 10.6* 10.6*   HEMATOCRIT % 33.5* 33.2* 34.1*   PLATELETS 10*3/mm3 248 242 258     Results " from last 7 days   Lab Units 08/11/19  0603 08/10/19  0703 08/09/19  0502 08/08/19  0738  08/06/19  0905   SODIUM mmol/L 141 137 138 138   < > 136   POTASSIUM mmol/L 4.5 5.1 5.0 5.2   < > 5.6*   CHLORIDE mmol/L 105 106 104 104   < > 106   CO2 mmol/L 20.0* 17.0* 17.0* 17.0*   < > 13.0*   BUN mg/dL 66* 66* 63* 61*   < > 48*   CREATININE mg/dL 4.48* 4.92* 5.29* 4.94*   < > 4.68*   CALCIUM mg/dL 8.0* 7.8* 7.7* 8.1*   < > 8.0*   PHOSPHORUS mg/dL 7.9* 8.2*  --   --   --   --    ALBUMIN g/dL 2.50* 2.50*  --   --   --  2.40*    < > = values in this interval not displayed.     Results from last 7 days   Lab Units 08/11/19  0603   GLUCOSE mg/dL 103*       Results from last 7 days   Lab Units 08/06/19  0905   ALK PHOS U/L 201*   BILIRUBIN mg/dL 0.3   ALT (SGPT) U/L 20   AST (SGOT) U/L 17                 Estimated Creatinine Clearance: 26.8 mL/min (A) (by C-G formula based on SCr of 4.48 mg/dL (H)).      Assessment       Sepsis (CMS/[facility])    DUNLAP Cirrhosis    Essential hypertension    Non-compliance    Hyperlipemia    Type 2 diabetes mellitus with circulatory disorder and uncontrolled    History of MRSA infection    JUAN (acute kidney injury) (CMS/[facility])    S/P BKA (below knee amputation), left (CMS/[facility])    Peripheral vascular disease (CMS/[facility])    DM (diabetes mellitus) type II uncontrolled, periph vascular disorder (CMS/[facility])    Abscess    UTI (urinary tract infection)    Hyponatremia            Impression: JUAN WITH IMPROVING GFR. HYPERPHOSPHATEMIA IMPROVING ON BINDER. ANEMIA.            Recommendations: IF ABLE TO DISCHARGE SOON THE WE CAN F/U IN OUT [facility].      Brad Nash MD  08/11/19  1:38 PM

## 2019-08-12 PROBLEM — E87.1 HYPONATREMIA: Status: RESOLVED | Noted: 2019-07-30 | Resolved: 2019-08-12

## 2019-08-12 PROBLEM — N39.0 UTI (URINARY TRACT INFECTION): Status: RESOLVED | Noted: 2019-07-30 | Resolved: 2019-08-12

## 2019-08-12 PROBLEM — A41.9 SEPSIS (HCC): Status: RESOLVED | Noted: 2019-07-30 | Resolved: 2019-08-12

## 2019-08-12 PROBLEM — L02.91 ABSCESS: Status: RESOLVED | Noted: 2019-07-30 | Resolved: 2019-08-12

## 2019-08-12 LAB
ANION GAP SERPL CALCULATED.3IONS-SCNC: 16 MMOL/L (ref 5–15)
BUN BLD-MCNC: 59 MG/DL (ref 6–20)
BUN/CREAT SERPL: 16 (ref 7–25)
CALCIUM SPEC-SCNC: 8.3 MG/DL (ref 8.6–10.5)
CHLORIDE SERPL-SCNC: 101 MMOL/L (ref 98–107)
CO2 SERPL-SCNC: 25 MMOL/L (ref 22–29)
CREAT BLD-MCNC: 3.69 MG/DL (ref 0.76–1.27)
DEPRECATED RDW RBC AUTO: 49.6 FL (ref 37–54)
ERYTHROCYTE [DISTWIDTH] IN BLOOD BY AUTOMATED COUNT: 16.3 % (ref 12.3–15.4)
GFR SERPL CREATININE-BSD FRML MDRD: 18 ML/MIN/1.73
GLUCOSE BLD-MCNC: 170 MG/DL (ref 65–99)
GLUCOSE BLDC GLUCOMTR-MCNC: 122 MG/DL (ref 70–130)
GLUCOSE BLDC GLUCOMTR-MCNC: 136 MG/DL (ref 70–130)
GLUCOSE BLDC GLUCOMTR-MCNC: 141 MG/DL (ref 70–130)
GLUCOSE BLDC GLUCOMTR-MCNC: 154 MG/DL (ref 70–130)
HCT VFR BLD AUTO: 34.9 % (ref 37.5–51)
HGB BLD-MCNC: 11.2 G/DL (ref 13–17.7)
MCH RBC QN AUTO: 27.3 PG (ref 26.6–33)
MCHC RBC AUTO-ENTMCNC: 32.1 G/DL (ref 31.5–35.7)
MCV RBC AUTO: 85.1 FL (ref 79–97)
PLATELET # BLD AUTO: 255 10*3/MM3 (ref 140–450)
PMV BLD AUTO: 11.3 FL (ref 6–12)
POTASSIUM BLD-SCNC: 4.5 MMOL/L (ref 3.5–5.2)
RBC # BLD AUTO: 4.1 10*6/MM3 (ref 4.14–5.8)
SODIUM BLD-SCNC: 142 MMOL/L (ref 136–145)
WBC NRBC COR # BLD: 13.7 10*3/MM3 (ref 3.4–10.8)

## 2019-08-12 PROCEDURE — 85027 COMPLETE CBC AUTOMATED: CPT | Performed by: NURSE PRACTITIONER

## 2019-08-12 PROCEDURE — 63710000001 INSULIN DETEMIR PER 5 UNITS: Performed by: INTERNAL MEDICINE

## 2019-08-12 PROCEDURE — 82962 GLUCOSE BLOOD TEST: CPT

## 2019-08-12 PROCEDURE — 25010000002 CEFTRIAXONE PER 250 MG: Performed by: INTERNAL MEDICINE

## 2019-08-12 PROCEDURE — 25010000002 HEPARIN (PORCINE) PER 1000 UNITS: Performed by: INTERNAL MEDICINE

## 2019-08-12 PROCEDURE — 25010000002 ONDANSETRON PER 1 MG: Performed by: NURSE PRACTITIONER

## 2019-08-12 PROCEDURE — 99232 SBSQ HOSP IP/OBS MODERATE 35: CPT | Performed by: INTERNAL MEDICINE

## 2019-08-12 PROCEDURE — 80048 BASIC METABOLIC PNL TOTAL CA: CPT | Performed by: NURSE PRACTITIONER

## 2019-08-12 RX ORDER — SODIUM BICARBONATE 650 MG/1
1300 TABLET ORAL 2 TIMES DAILY
Qty: 60 TABLET | Refills: 0 | Status: SHIPPED | OUTPATIENT
Start: 2019-08-12 | End: 2019-08-14 | Stop reason: HOSPADM

## 2019-08-12 RX ORDER — CEFUROXIME AXETIL 500 MG/1
500 TABLET ORAL DAILY
Qty: 7 TABLET | Refills: 0 | Status: SHIPPED | OUTPATIENT
Start: 2019-08-13 | End: 2019-08-14 | Stop reason: SDUPTHER

## 2019-08-12 RX ORDER — METOPROLOL TARTRATE 100 MG/1
100 TABLET ORAL EVERY 12 HOURS SCHEDULED
Status: DISCONTINUED | OUTPATIENT
Start: 2019-08-12 | End: 2019-08-13

## 2019-08-12 RX ORDER — SEVELAMER CARBONATE FOR ORAL SUSPENSION 800 MG/1
800 POWDER, FOR SUSPENSION ORAL
Qty: 90 PACKET | Refills: 0 | Status: SHIPPED | OUTPATIENT
Start: 2019-08-12 | End: 2019-08-26 | Stop reason: SDUPTHER

## 2019-08-12 RX ORDER — METOPROLOL TARTRATE 75 MG/1
100 TABLET, FILM COATED ORAL EVERY 12 HOURS SCHEDULED
Refills: 1 | Status: ON HOLD
Start: 2019-08-12 | End: 2019-08-18

## 2019-08-12 RX ORDER — METRONIDAZOLE 500 MG/1
500 TABLET ORAL 2 TIMES DAILY
Qty: 14 TABLET | Refills: 0 | Status: SHIPPED | OUTPATIENT
Start: 2019-08-13 | End: 2019-08-14 | Stop reason: SDUPTHER

## 2019-08-12 RX ORDER — TERAZOSIN 5 MG/1
5 CAPSULE ORAL NIGHTLY
Qty: 30 CAPSULE | Refills: 0 | Status: ON HOLD | OUTPATIENT
Start: 2019-08-12 | End: 2019-11-14 | Stop reason: SDUPTHER

## 2019-08-12 RX ORDER — AMLODIPINE BESYLATE 10 MG/1
10 TABLET ORAL
Qty: 30 TABLET | Refills: 0 | Status: ON HOLD | OUTPATIENT
Start: 2019-08-13 | End: 2019-11-14 | Stop reason: SDUPTHER

## 2019-08-12 RX ORDER — SACCHAROMYCES BOULARDII 250 MG
250 CAPSULE ORAL 2 TIMES DAILY
Qty: 28 CAPSULE | Refills: 0 | Status: SHIPPED | OUTPATIENT
Start: 2019-08-12 | End: 2019-08-28

## 2019-08-12 RX ADMIN — HEPARIN SODIUM 5000 UNITS: 5000 INJECTION INTRAVENOUS; SUBCUTANEOUS at 14:29

## 2019-08-12 RX ADMIN — METRONIDAZOLE 500 MG: 500 INJECTION, SOLUTION INTRAVENOUS at 01:10

## 2019-08-12 RX ADMIN — METRONIDAZOLE 500 MG: 500 INJECTION, SOLUTION INTRAVENOUS at 17:36

## 2019-08-12 RX ADMIN — SEVELAMER CARBONATE 0.8 G: 0.8 POWDER, FOR SUSPENSION ORAL at 08:47

## 2019-08-12 RX ADMIN — SEVELAMER CARBONATE 0.8 G: 0.8 POWDER, FOR SUSPENSION ORAL at 17:37

## 2019-08-12 RX ADMIN — MICAFUNGIN SODIUM 100 MG: 20 INJECTION, POWDER, LYOPHILIZED, FOR SOLUTION INTRAVENOUS at 14:29

## 2019-08-12 RX ADMIN — CARVEDILOL 25 MG: 12.5 TABLET, FILM COATED ORAL at 08:47

## 2019-08-12 RX ADMIN — INSULIN DETEMIR 30 UNITS: 100 INJECTION, SOLUTION SUBCUTANEOUS at 08:49

## 2019-08-12 RX ADMIN — HYDROCODONE BITARTRATE AND ACETAMINOPHEN 1 TABLET: 10; 325 TABLET ORAL at 04:03

## 2019-08-12 RX ADMIN — TAMSULOSIN HYDROCHLORIDE 0.8 MG: 0.4 CAPSULE ORAL at 21:39

## 2019-08-12 RX ADMIN — AMLODIPINE BESYLATE 10 MG: 10 TABLET ORAL at 08:47

## 2019-08-12 RX ADMIN — Medication 250 MG: at 21:39

## 2019-08-12 RX ADMIN — METRONIDAZOLE 500 MG: 500 INJECTION, SOLUTION INTRAVENOUS at 08:46

## 2019-08-12 RX ADMIN — CEFTRIAXONE SODIUM 1 G: 1 INJECTION, POWDER, FOR SOLUTION INTRAMUSCULAR; INTRAVENOUS at 17:36

## 2019-08-12 RX ADMIN — SODIUM BICARBONATE 650 MG TABLET 1300 MG: at 21:39

## 2019-08-12 RX ADMIN — METRONIDAZOLE 500 MG: 500 INJECTION, SOLUTION INTRAVENOUS at 23:47

## 2019-08-12 RX ADMIN — METOPROLOL TARTRATE 100 MG: 100 TABLET ORAL at 21:39

## 2019-08-12 RX ADMIN — INSULIN DETEMIR 30 UNITS: 100 INJECTION, SOLUTION SUBCUTANEOUS at 21:40

## 2019-08-12 RX ADMIN — INSULIN LISPRO 2 UNITS: 100 INJECTION, SOLUTION INTRAVENOUS; SUBCUTANEOUS at 21:40

## 2019-08-12 RX ADMIN — ONDANSETRON 4 MG: 2 INJECTION INTRAMUSCULAR; INTRAVENOUS at 03:55

## 2019-08-12 RX ADMIN — TERAZOSIN HYDROCHLORIDE ANHYDROUS 5 MG: 5 CAPSULE ORAL at 21:39

## 2019-08-12 RX ADMIN — HEPARIN SODIUM 5000 UNITS: 5000 INJECTION INTRAVENOUS; SUBCUTANEOUS at 06:23

## 2019-08-12 RX ADMIN — Medication 250 MG: at 08:47

## 2019-08-12 RX ADMIN — DULOXETINE HYDROCHLORIDE 30 MG: 30 CAPSULE, DELAYED RELEASE ORAL at 08:47

## 2019-08-12 RX ADMIN — SODIUM BICARBONATE 650 MG TABLET 1300 MG: at 08:47

## 2019-08-12 RX ADMIN — SEVELAMER CARBONATE 0.8 G: 0.8 POWDER, FOR SUSPENSION ORAL at 14:30

## 2019-08-12 RX ADMIN — HEPARIN SODIUM 5000 UNITS: 5000 INJECTION INTRAVENOUS; SUBCUTANEOUS at 21:40

## 2019-08-12 NOTE — PROGRESS NOTES
"   LOS: 13 days    Patient Care Team:  Keyanna Leone APRN as PCP - General (Nurse Practitioner)    Reason For Visit:  F/U JUAN  Subjective       Appetite low  No new events    Review of Systems:    Pulm: No soa   CV:  No CP.    GI: NO N/V/D/ANOREXIA      Objective       amLODIPine 10 mg Oral Q24H   carvedilol 25 mg Oral BID With Meals   ceftriaxone 1 g Intravenous Q24H   DULoxetine 30 mg Oral Daily   heparin (porcine) 5,000 Units Subcutaneous Q8H   insulin detemir 30 Units Subcutaneous Q12H   insulin lispro 0-9 Units Subcutaneous 4x Daily With Meals & Nightly   insulin lispro 5 Units Subcutaneous TID With Meals   metroNIDAZOLE 500 mg Intravenous Q8H   micafungin (MYCAMINE)  mg Intravenous Q24H   saccharomyces boulardii 250 mg Oral BID   sevelamer 800 mg Oral TID With Meals   sodium bicarbonate 1,300 mg Oral BID   tamsulosin 0.8 mg Oral Nightly   terazosin 5 mg Oral Nightly            Vital Signs:  Blood pressure 150/89, pulse 72, temperature 98.6 °F (37 °C), temperature source Oral, resp. rate 18, height 190.5 cm (75\"), weight 113 kg (250 lb), SpO2 94 %.    Flowsheet Rows      First Filed Value   Admission Height  190.5 cm (75\") Documented at 07/30/2019 1623   Admission Weight  113 kg (250 lb) Documented at 07/30/2019 1623          08/11 0701 - 08/12 0700  In: 540 [P.O.:240]  Out: 900 [Urine:900]    Physical Exam:    General Appearance: NAD, alert and cooperative, Ox3  Eyes: PER, conjunctivae and sclerae normal, no icterus  Lungs: respirations regular and unlabored, no crepitus, clear to auscultation  Heart/CV: regular rhythm & normal rate, no murmur, no gallop, no rub and no edema  Abdomen: not distended, soft, non-tender, no masses,  bowel sounds present  Skin: No rash, Warm and dry    Radiology:            Labs:  Results from last 7 days   Lab Units 08/12/19  0627 08/11/19  0603 08/10/19  0703   WBC 10*3/mm3 13.70* 11.25* 12.73*   HEMOGLOBIN g/dL 11.2* 10.6* 10.6*   HEMATOCRIT % 34.9* 33.5* 33.2* "   PLATELETS 10*3/mm3 255 248 242     Results from last 7 days   Lab Units 08/12/19  0627 08/11/19  0603 08/10/19  0703 08/09/19  0502  08/06/19  0905   SODIUM mmol/L 142 141 137 138   < > 136   POTASSIUM mmol/L 4.5 4.5 5.1 5.0   < > 5.6*   CHLORIDE mmol/L 101 105 106 104   < > 106   CO2 mmol/L 25.0 20.0* 17.0* 17.0*   < > 13.0*   BUN mg/dL 59* 66* 66* 63*   < > 48*   CREATININE mg/dL 3.69* 4.48* 4.92* 5.29*   < > 4.68*   CALCIUM mg/dL 8.3* 8.0* 7.8* 7.7*   < > 8.0*   PHOSPHORUS mg/dL  --  7.9* 8.2*  --   --   --    ALBUMIN g/dL  --  2.50* 2.50*  --   --  2.40*    < > = values in this interval not displayed.     Results from last 7 days   Lab Units 08/12/19  0627   GLUCOSE mg/dL 170*       Results from last 7 days   Lab Units 08/06/19  0905   ALK PHOS U/L 201*   BILIRUBIN mg/dL 0.3   ALT (SGPT) U/L 20   AST (SGOT) U/L 17                 Estimated Creatinine Clearance: 32.5 mL/min (A) (by C-G formula based on SCr of 3.69 mg/dL (H)).      Assessment       DUNLAP Cirrhosis    Essential hypertension    Non-compliance    Hyperlipemia    Type 2 diabetes mellitus with circulatory disorder and uncontrolled    History of MRSA infection    JUAN (acute kidney injury) (CMS/[facility])    S/P BKA (below knee amputation), left (CMS/[facility])    Peripheral vascular disease (CMS/[facility])    DM (diabetes mellitus) type II uncontrolled, periph vascular disorder (CMS/[facility])            Impression:   JUAN WITH IMPROVING GFR, slow.   HYPERPHOSPHATEMIA IMPROVING ON BINDER.   ANEMIA.      Recommendations:  Ok for dc when medically stable   Fu in clinic in 1-2 weeks with labs    Jaime Rubio MD  08/12/19  11:11 AM

## 2019-08-12 NOTE — PROGRESS NOTES
[facility] Medicine Services  PROGRESS NOTE    Patient Name: Kendrick Crystal  : 1970  MRN: [id number]    Date of Admission: 2019  Length of Stay: 13  Primary Care Physician: Keyanna Leone APRN    Subjective   Subjective   CC:  Perirectal Abscess     HPI:  Diarrhea improved.      Review of Systems  Gen- No fevers, chills  CV- No chest pain, palpitations  Resp- No cough, dyspnea  GI- No N/V/D, abd pain    Objective   Objective   Vital Signs:   Temp:  [97.3 °F (36.3 °C)-98.6 °F (37 °C)] 98.5 °F (36.9 °C)  Heart Rate:  [72-75] 72  Resp:  [18] 18  BP: (150-189)/() 161/89  Physical Exam:    Constitutional: Awake, alert, laying in bed, chronically ill appearing  HEENT: NCAT, MMM  Respiratory: Non-labored, CTAB  Cardiovascular: RRR, no murmurs  Gastrointestinal: Soft, NT, ND +BS  Musculoskeletal: OLGUIN; no LE edema right LE; right BKA  Neurologic: Speech clear, no focal deficits  Skin: No rashes on exposed skin  Psychiatric: Pleasant and cooperative; normal affect    Results Reviewed:  I have personally reviewed current lab, radiology, and data and agree.  Results from last 7 days   Lab Units 19  0603 08/10/19  0703   WBC 10*3/mm3 13.70* 11.25* 12.73*   HEMOGLOBIN g/dL 11.2* 10.6* 10.6*   HEMATOCRIT % 34.9* 33.5* 33.2*   PLATELETS 10*3/mm3 255 248 242     Results from last 7 days   Lab Units 19  0619  0603 08/10/19  0703  19  0905   SODIUM mmol/L 142 141 137   < > 136   POTASSIUM mmol/L 4.5 4.5 5.1   < > 5.6*   CHLORIDE mmol/L 101 105 106   < > 106   CO2 mmol/L 25.0 20.0* 17.0*   < > 13.0*   BUN mg/dL 59* 66* 66*   < > 48*   CREATININE mg/dL 3.69* 4.48* 4.92*   < > 4.68*   GLUCOSE mg/dL 170* 103* 155*   < > 178*   CALCIUM mg/dL 8.3* 8.0* 7.8*   < > 8.0*   ALT (SGPT) U/L  --   --   --   --  20   AST (SGOT) U/L  --   --   --   --  17    < > = values in this interval not displayed.     Estimated Creatinine Clearance: 32.5 mL/min (A) (by C-G  formula based on SCr of 3.69 mg/dL (H)).    Microbiology Results Abnormal     Procedure Component Value - Date/Time    Fecal Leukocytes - Stool, Per Rectum [[id number]]  (Normal) Collected:  08/10/19 0324    Lab Status:  Final result Specimen:  Stool from Per Rectum Updated:  08/10/19 0905     Fecal Leukocytes No WBC's Seen    Anaerobic Culture - Wound, Rectal Abscess [[id number]] Collected:  08/02/19 1620    Lab Status:  Final result Specimen:  Wound from Rectal Abscess Updated:  08/07/19 0914     Anaerobic Culture No anaerobes isolated at 5 days    Wound Culture - Wound, Rectal Abscess [[id number]]  (Abnormal)  (Susceptibility) Collected:  08/02/19 1620    Lab Status:  Final result Specimen:  Wound from Rectal Abscess Updated:  08/06/19 0922     Wound Culture Scant growth (1+) Escherichia coli      Scant growth (1+) Klebsiella pneumoniae ssp pneumoniae      Scant growth (1+) Normal Fecal Haily     Gram Stain Few (2+) WBCs seen      Occasional Yeast      Rare (1+) Gram negative bacilli    Narrative:       Specimen is an abscess - work up all GNRs per Dr. Dong ([facility] infection control).    Susceptibility      Escherichia coli     EYAD     Ampicillin Resistant     Ampicillin + Sulbactam Intermediate     Cefazolin Susceptible     Cefepime Susceptible     Ceftazidime Susceptible     Ceftriaxone Susceptible     Gentamicin Susceptible     Levofloxacin Resistant     Piperacillin + Tazobactam Susceptible     Tetracycline Susceptible     Trimethoprim + Sulfamethoxazole Susceptible                Susceptibility      Klebsiella pneumoniae ssp pneumoniae     EYAD     Ampicillin Resistant     Ampicillin + Sulbactam Susceptible     Cefazolin Susceptible     Cefepime Susceptible     Ceftazidime Susceptible     Ceftriaxone Susceptible     Gentamicin Susceptible     Levofloxacin Susceptible     Piperacillin + Tazobactam Susceptible     Tetracycline Susceptible     Trimethoprim + Sulfamethoxazole Susceptible                    Blood  Culture - Blood, Hand, Left [[id number]] Collected:  07/30/19 1845    Lab Status:  Final result Specimen:  Blood from Hand, Left Updated:  08/04/19 2145     Blood Culture No growth at 5 days    Blood Culture - Blood, Arm, Right [[id number]] Collected:  07/30/19 1840    Lab Status:  Final result Specimen:  Blood from Arm, Right Updated:  08/04/19 2145     Blood Culture No growth at 5 days    Urine Culture - Urine, Urine, Clean Catch [[id number]]  (Normal) Collected:  07/30/19 1725    Lab Status:  Final result Specimen:  Urine, Clean Catch Updated:  07/31/19 2019     Urine Culture No growth        Imaging Results (last 24 hours)     ** No results found for the last 24 hours. **        Results for orders placed during the hospital encounter of 05/18/19   Adult Transthoracic Echo Complete W/ Cont if Necessary Per Protocol    Narrative · Estimated EF = 60%.  · Mild mitral valve regurgitation is present  · Mild tricuspid valve regurgitation is present.  · Calculated right ventricular systolic pressure from tricuspid   regurgitation is 29 mmHg.        I have reviewed the medications:  Scheduled Meds:    amLODIPine 10 mg Oral Q24H   carvedilol 25 mg Oral BID With Meals   ceftriaxone 1 g Intravenous Q24H   DULoxetine 30 mg Oral Daily   heparin (porcine) 5,000 Units Subcutaneous Q8H   insulin detemir 30 Units Subcutaneous Q12H   insulin lispro 0-9 Units Subcutaneous 4x Daily With Meals & Nightly   insulin lispro 5 Units Subcutaneous TID With Meals   metroNIDAZOLE 500 mg Intravenous Q8H   micafungin (MYCAMINE)  mg Intravenous Q24H   saccharomyces boulardii 250 mg Oral BID   sevelamer 800 mg Oral TID With Meals   sodium bicarbonate 1,300 mg Oral BID   tamsulosin 0.8 mg Oral Nightly   terazosin 5 mg Oral Nightly     Continuous Infusions:     PRN Meds:.dextrose  •  dextrose  •  glucagon (human recombinant)  •  HYDROcodone-acetaminophen  •  ondansetron **OR** ondansetron  •  sodium chloride    Assessment/Plan   Assessment /  "Plan     Active Hospital Problems    Diagnosis  POA   • DM (diabetes mellitus) type II uncontrolled, periph vascular disorder (CMS/[facility]) [E11.51, E11.65]  Yes   • Peripheral vascular disease (CMS/[facility]) [I73.9]  Yes   • S/P BKA (below knee amputation), left (CMS/[facility]) [Z89.512]  Not Applicable   • JUAN (acute kidney injury) (CMS/[facility]) [N17.9]  Unknown   • History of MRSA infection [Z86.14]  Yes   • Type 2 diabetes mellitus with circulatory disorder and uncontrolled [E11.59]  Yes   • Essential hypertension [I10]  Yes   • Hyperlipemia [E78.5]  Yes   • DUNLAP Cirrhosis [K74.60]  Yes   • Non-compliance [Z91.14]  Not Applicable      Resolved Hospital Problems    Diagnosis Date Resolved POA   • **Sepsis (CMS/[facility]) [A41.9] 08/12/2019 Yes   • Abscess [L02.91] 08/12/2019 Yes   • UTI (urinary tract infection) [N39.0] 08/12/2019 Yes   • Hyponatremia [E87.1] 08/12/2019 Yes     Brief Hospital Course to date:  Kendrick Crystal is a 48 year old gentleman with uncontrolled diabetes who presents with sepsis secondary to abscess inferior to the prostate and anterior to the rectum.    Sepsis, claudia-rectal abscess, UTI   --CRS following  -pt met sepsis criteria based on elevated lactic acid, leukocytosis, tachycardia and positive source  -CT abd/pel obtained: \"3.0 x 3.2 cm fluid collection with enhancing margins consistent with an abscess related to the inferior aspect of the prostate gland and anterior to the distal rectum.\"  -abscess drainage done 8/2, cultures with e coli and klebsiella.  Continue ceftriaxone, flagyl, micafungin  --Continue current abx tx per ID, transition to PO ceftin/flagyl x 1 week at discharge    JUAN  Metabolic acidosis  Hyperkalemia  --Creatinine improved today  -Renal following.  Multifactorial.    -Remains on valtessa for K+  --Nephrology added PO4 binder  --[facility] [place] [facility] after discharge  --AM labs    Hyponatremia  - Resolved, stable    Anemia  --hgb 10.6; stable  --AM labs     Uncontrolled " "T2DM, a1c>14%  -pt reports that he often \"forgets\" to take his routine medications; including his insulin and does not check his glucose daily  -blood sugar >600 on admission, but did not meet criteria for DKA, now improving with subQ insulin   --Blood sugar well controlled on current regimen     HTN  -labile, monitor   -have stopped lisinopril given JUAN  --monitor closely, norvasc increased 8/6     DVT prophylaxis:  SCD- right leg, heparin       Disposition: Plans were for discharge today but patient/family do not feel ready now due to poor appetite and weakness.  PT/OT consulted and will consider rehabilitation if he qualifies.    CODE STATUS:   Code Status and Medical Interventions:   Ordered at: 07/30/19 2051     Code Status:    CPR     Medical Interventions (Level of Support Prior to Arrest):    Full     Electronically signed by Jazmín Garcia MD, 08/12/19, 3:02 PM.      "

## 2019-08-13 LAB
ALBUMIN SERPL-MCNC: 2.9 G/DL (ref 3.5–5.2)
ALBUMIN/GLOB SERPL: 0.7 G/DL
ALP SERPL-CCNC: 196 U/L (ref 39–117)
ALT SERPL W P-5'-P-CCNC: 12 U/L (ref 1–41)
ANION GAP SERPL CALCULATED.3IONS-SCNC: 15 MMOL/L (ref 5–15)
ANION GAP SERPL CALCULATED.3IONS-SCNC: 20 MMOL/L (ref 5–15)
AST SERPL-CCNC: 16 U/L (ref 1–40)
BILIRUB SERPL-MCNC: <0.2 MG/DL (ref 0.2–1.2)
BUN BLD-MCNC: 49 MG/DL (ref 6–20)
BUN BLD-MCNC: 50 MG/DL (ref 6–20)
BUN/CREAT SERPL: 15.1 (ref 7–25)
BUN/CREAT SERPL: 16.7 (ref 7–25)
CALCIUM SPEC-SCNC: 8.1 MG/DL (ref 8.6–10.5)
CALCIUM SPEC-SCNC: 8.4 MG/DL (ref 8.6–10.5)
CHLORIDE SERPL-SCNC: 100 MMOL/L (ref 98–107)
CHLORIDE SERPL-SCNC: 99 MMOL/L (ref 98–107)
CO2 SERPL-SCNC: 21 MMOL/L (ref 22–29)
CO2 SERPL-SCNC: 25 MMOL/L (ref 22–29)
CREAT BLD-MCNC: 3 MG/DL (ref 0.76–1.27)
CREAT BLD-MCNC: 3.24 MG/DL (ref 0.76–1.27)
GFR SERPL CREATININE-BSD FRML MDRD: 20 ML/MIN/1.73
GFR SERPL CREATININE-BSD FRML MDRD: 22 ML/MIN/1.73
GLOBULIN UR ELPH-MCNC: 4 GM/DL
GLUCOSE BLD-MCNC: 193 MG/DL (ref 65–99)
GLUCOSE BLD-MCNC: 195 MG/DL (ref 65–99)
GLUCOSE BLDC GLUCOMTR-MCNC: 140 MG/DL (ref 70–130)
GLUCOSE BLDC GLUCOMTR-MCNC: 143 MG/DL (ref 70–130)
GLUCOSE BLDC GLUCOMTR-MCNC: 177 MG/DL (ref 70–130)
GLUCOSE BLDC GLUCOMTR-MCNC: 99 MG/DL (ref 70–130)
GLUCOSE BLDC GLUCOMTR-MCNC: 99 MG/DL (ref 70–130)
PHOSPHATE SERPL-MCNC: 6.1 MG/DL (ref 2.5–4.5)
POTASSIUM BLD-SCNC: 3.9 MMOL/L (ref 3.5–5.2)
POTASSIUM BLD-SCNC: 3.9 MMOL/L (ref 3.5–5.2)
PROT SERPL-MCNC: 6.9 G/DL (ref 6–8.5)
SODIUM BLD-SCNC: 139 MMOL/L (ref 136–145)
SODIUM BLD-SCNC: 141 MMOL/L (ref 136–145)

## 2019-08-13 PROCEDURE — 63710000001 PROMETHAZINE PER 25 MG: Performed by: NURSE PRACTITIONER

## 2019-08-13 PROCEDURE — 97162 PT EVAL MOD COMPLEX 30 MIN: CPT

## 2019-08-13 PROCEDURE — 80053 COMPREHEN METABOLIC PANEL: CPT | Performed by: INTERNAL MEDICINE

## 2019-08-13 PROCEDURE — 25010000002 HEPARIN (PORCINE) PER 1000 UNITS: Performed by: INTERNAL MEDICINE

## 2019-08-13 PROCEDURE — 97167 OT EVAL HIGH COMPLEX 60 MIN: CPT

## 2019-08-13 PROCEDURE — 99233 SBSQ HOSP IP/OBS HIGH 50: CPT | Performed by: NURSE PRACTITIONER

## 2019-08-13 PROCEDURE — 82962 GLUCOSE BLOOD TEST: CPT

## 2019-08-13 PROCEDURE — 63710000001 INSULIN DETEMIR PER 5 UNITS: Performed by: INTERNAL MEDICINE

## 2019-08-13 PROCEDURE — 25010000002 ONDANSETRON PER 1 MG: Performed by: NURSE PRACTITIONER

## 2019-08-13 PROCEDURE — 25010000002 CEFTRIAXONE PER 250 MG: Performed by: INTERNAL MEDICINE

## 2019-08-13 PROCEDURE — 25010000003 MICAFUNGIN SODIUM 100 MG RECONSTITUTED SOLUTION: Performed by: INTERNAL MEDICINE

## 2019-08-13 PROCEDURE — 84100 ASSAY OF PHOSPHORUS: CPT

## 2019-08-13 RX ORDER — PROMETHAZINE HYDROCHLORIDE 25 MG/ML
12.5 INJECTION, SOLUTION INTRAMUSCULAR; INTRAVENOUS EVERY 6 HOURS PRN
Status: DISCONTINUED | OUTPATIENT
Start: 2019-08-13 | End: 2019-08-14 | Stop reason: HOSPADM

## 2019-08-13 RX ORDER — ERYTHROMYCIN 250 MG/1
250 TABLET, COATED ORAL
Status: DISCONTINUED | OUTPATIENT
Start: 2019-08-13 | End: 2019-08-14 | Stop reason: HOSPADM

## 2019-08-13 RX ORDER — HYDRALAZINE HYDROCHLORIDE 50 MG/1
50 TABLET, FILM COATED ORAL EVERY 8 HOURS SCHEDULED
Status: DISCONTINUED | OUTPATIENT
Start: 2019-08-13 | End: 2019-08-14

## 2019-08-13 RX ORDER — HYDRALAZINE HYDROCHLORIDE 25 MG/1
25 TABLET, FILM COATED ORAL EVERY 8 HOURS SCHEDULED
Status: DISCONTINUED | OUTPATIENT
Start: 2019-08-13 | End: 2019-08-13

## 2019-08-13 RX ORDER — CARVEDILOL 12.5 MG/1
25 TABLET ORAL EVERY 12 HOURS SCHEDULED
Status: DISCONTINUED | OUTPATIENT
Start: 2019-08-13 | End: 2019-08-14 | Stop reason: HOSPADM

## 2019-08-13 RX ORDER — PROMETHAZINE HYDROCHLORIDE 25 MG/1
12.5 TABLET ORAL EVERY 6 HOURS PRN
Status: DISCONTINUED | OUTPATIENT
Start: 2019-08-13 | End: 2019-08-14 | Stop reason: HOSPADM

## 2019-08-13 RX ORDER — CLONIDINE HYDROCHLORIDE 0.1 MG/1
0.1 TABLET ORAL EVERY 12 HOURS PRN
Status: DISCONTINUED | OUTPATIENT
Start: 2019-08-13 | End: 2019-08-14 | Stop reason: HOSPADM

## 2019-08-13 RX ADMIN — DULOXETINE HYDROCHLORIDE 30 MG: 30 CAPSULE, DELAYED RELEASE ORAL at 07:53

## 2019-08-13 RX ADMIN — SODIUM BICARBONATE 650 MG TABLET 1300 MG: at 07:54

## 2019-08-13 RX ADMIN — INSULIN LISPRO 2 UNITS: 100 INJECTION, SOLUTION INTRAVENOUS; SUBCUTANEOUS at 23:07

## 2019-08-13 RX ADMIN — HYDRALAZINE HYDROCHLORIDE 50 MG: 50 TABLET, FILM COATED ORAL at 23:06

## 2019-08-13 RX ADMIN — SEVELAMER CARBONATE 0.8 G: 0.8 POWDER, FOR SUSPENSION ORAL at 07:54

## 2019-08-13 RX ADMIN — SODIUM BICARBONATE 650 MG TABLET 1300 MG: at 23:06

## 2019-08-13 RX ADMIN — INSULIN DETEMIR 30 UNITS: 100 INJECTION, SOLUTION SUBCUTANEOUS at 23:07

## 2019-08-13 RX ADMIN — METRONIDAZOLE 500 MG: 500 INJECTION, SOLUTION INTRAVENOUS at 07:53

## 2019-08-13 RX ADMIN — SODIUM CHLORIDE, PRESERVATIVE FREE 10 ML: 5 INJECTION INTRAVENOUS at 23:06

## 2019-08-13 RX ADMIN — ONDANSETRON 4 MG: 2 INJECTION INTRAMUSCULAR; INTRAVENOUS at 07:58

## 2019-08-13 RX ADMIN — AMLODIPINE BESYLATE 10 MG: 10 TABLET ORAL at 07:54

## 2019-08-13 RX ADMIN — HYDROCODONE BITARTRATE AND ACETAMINOPHEN 1 TABLET: 10; 325 TABLET ORAL at 23:05

## 2019-08-13 RX ADMIN — Medication 250 MG: at 23:05

## 2019-08-13 RX ADMIN — INSULIN DETEMIR 30 UNITS: 100 INJECTION, SOLUTION SUBCUTANEOUS at 07:55

## 2019-08-13 RX ADMIN — ONDANSETRON 4 MG: 2 INJECTION INTRAMUSCULAR; INTRAVENOUS at 02:27

## 2019-08-13 RX ADMIN — ERYTHROMYCIN 250 MG: 250 TABLET, FILM COATED ORAL at 18:10

## 2019-08-13 RX ADMIN — SEVELAMER CARBONATE 0.8 G: 0.8 POWDER, FOR SUSPENSION ORAL at 12:11

## 2019-08-13 RX ADMIN — SEVELAMER CARBONATE 0.8 G: 0.8 POWDER, FOR SUSPENSION ORAL at 18:10

## 2019-08-13 RX ADMIN — TAMSULOSIN HYDROCHLORIDE 0.8 MG: 0.4 CAPSULE ORAL at 23:05

## 2019-08-13 RX ADMIN — HYDRALAZINE HYDROCHLORIDE 25 MG: 25 TABLET ORAL at 14:30

## 2019-08-13 RX ADMIN — METOPROLOL TARTRATE 100 MG: 100 TABLET ORAL at 07:52

## 2019-08-13 RX ADMIN — HEPARIN SODIUM 5000 UNITS: 5000 INJECTION INTRAVENOUS; SUBCUTANEOUS at 14:30

## 2019-08-13 RX ADMIN — CARVEDILOL 25 MG: 12.5 TABLET, FILM COATED ORAL at 23:06

## 2019-08-13 RX ADMIN — ERYTHROMYCIN 250 MG: 250 TABLET, FILM COATED ORAL at 14:40

## 2019-08-13 RX ADMIN — CLONIDINE HYDROCHLORIDE 0.1 MG: 0.1 TABLET ORAL at 16:55

## 2019-08-13 RX ADMIN — CEFTRIAXONE SODIUM 1 G: 1 INJECTION, POWDER, FOR SOLUTION INTRAMUSCULAR; INTRAVENOUS at 16:16

## 2019-08-13 RX ADMIN — TERAZOSIN HYDROCHLORIDE ANHYDROUS 5 MG: 5 CAPSULE ORAL at 23:07

## 2019-08-13 RX ADMIN — Medication 250 MG: at 07:54

## 2019-08-13 RX ADMIN — PROMETHAZINE HYDROCHLORIDE 12.5 MG: 25 TABLET ORAL at 14:30

## 2019-08-13 RX ADMIN — HEPARIN SODIUM 5000 UNITS: 5000 INJECTION INTRAVENOUS; SUBCUTANEOUS at 23:04

## 2019-08-13 RX ADMIN — METRONIDAZOLE 500 MG: 500 INJECTION, SOLUTION INTRAVENOUS at 16:55

## 2019-08-13 RX ADMIN — MICAFUNGIN SODIUM 100 MG: 20 INJECTION, POWDER, LYOPHILIZED, FOR SOLUTION INTRAVENOUS at 14:31

## 2019-08-13 NOTE — THERAPY EVALUATION
Acute Care - Occupational Therapy Initial Evaluation  [facility]     Patient Name: Kendrick Crystal  : 1970  MRN: [id number]  Today's Date: 2019  Onset of Illness/Injury or Date of Surgery: 19  Date of Referral to OT: 19  Referring Physician: Jsoe    Admit Date: 2019       ICD-10-CM ICD-9-CM   1. Intra-abdominal abscess (CMS/[facility]) K65.1 567.22   2. Sepsis, due to unspecified organism (CMS/[facility]) A41.9 038.9     995.91   3. Hyperglycemia R73.9 790.29   4. Acute UTI N39.0 599.0   5. Abscess of rectum K61.1 566   6. Essential hypertension I10 401.9   7. Impaired mobility and ADLs Z74.09 799.89     Patient Active Problem List   Diagnosis   • DUNLAP Cirrhosis   • Essential hypertension   • Non-compliance   • Hyperlipemia   • Hypertriglyceridemia, essential   • Type 2 diabetes mellitus with circulatory disorder and uncontrolled   • History of MRSA infection   • JUAN (acute kidney injury) (CMS/[facility])   • S/P BKA (below knee amputation), left (CMS/[facility])   • Peripheral vascular disease (CMS/[facility])   • DM (diabetes mellitus) type II uncontrolled, periph vascular disorder (CMS/[facility])   • Obesity (BMI 30-39.9)     Past Medical History:   Diagnosis Date   • Abdominal wall cellulitis 2019   • JUAN (acute kidney injury) (CMS/[facility]) 2018   • Arthritis    • Bipolar 1 disorder (CMS/[facility])    • Cellulitis of right anterior lower leg 2018   • Cirrhosis (CMS/[facility])    • Counseling for insulin pump    • Depression    • Diabetes mellitus (CMS/HCC)    • Encephalopathy, hepatic (CMS/HCC)    • H/O degenerative disc disease    • History of Lissa's gangrene     right leg/testicle   • Hyperlipemia    • Hypertension    • multiple Skin abscesses    • DUNLAP, bx showed stage IV fibrosis    • Neuropathy    • Peripheral vascular disease (CMS/HCC)    • Thrombophlebitis      Past Surgical History:   Procedure Laterality Date   • ABDOMINAL WALL ABSCESS INCISION AND DRAINAGE N/A 2019    Procedure: ABDOMINAL WALL  DEBRIDEMENT;  Surgeon: Fadi Kern MD;  Location:  [facility];  Service: General   • AMPUTATION DIGIT Left 1/30/2017    Procedure: left fourth and fifth transmetatarsal toe amputation ;  Surgeon: Juancho Martinez MD;  Location:  [facility];  Service:    • BELOW KNEE AMPUTATION Left 3/2/2017    Procedure: AMPUTATION BELOW KNEE, SHMUEL;  Surgeon: Juancho Martinez MD;  Location:  [facility];  Service:    • ENDOSCOPY     • HEMORRHOIDECTOMY N/A 8/2/2019    Procedure: EXCISION AND DRAIN PERIRECTAL ABSCESS;  Surgeon: Mumtaz Payne MD;  Location:  [facility];  Service: General   • LEG SURGERY     • TESTICLE SURGERY Right     DEBRIDEMENT FROM GANGRENE   • TONSILLECTOMY  1975          OT ASSESSMENT FLOWSHEET (last 12 hours)      Occupational Therapy Evaluation     Row Name 08/13/19 0910                   OT Evaluation Time/Intention    Subjective Information  complains of;weakness;nausea/vomiting  -TB        Document Type  evaluation  -TB        Mode of Treatment  occupational therapy  -TB        Patient Effort  fair  -TB        Symptoms Noted During/After Treatment  none  -TB        Comment  Pt reluctant to participate in therapy. H/O L BKA - reports poorly fitting prosthesis  -TB           General Information    Patient Profile Reviewed?  yes  -TB        Onset of Illness/Injury or Date of Surgery  08/12/19  -TB        Referring Physician  Brown  -TB        Patient Observations  agree to therapy with encouragement  -TB        Patient/Family Observations  Family at bedside  -TB        General Observations of Patient  Specialty bed  -TB        Prior Level of Function  max assist:;ADL's  -TB        Equipment Currently Used at Home  bath bench;wheelchair, motorized  -TB        Pertinent History of Current Functional Problem  Pt to ED with c/o worsening colorectal pain. Admitted with rectal/prostate abscess. S/P surgical mgmt  -TB        Existing Precautions/Restrictions  fall  -TB        Limitations/Impairments   safety/cognitive;hearing  -TB        Risks Reviewed  patient and family:;LOB;increased discomfort;lines disloged;nausea/vomiting  -TB        Benefits Reviewed  patient and family:;improve function;increase strength;increase knowledge  -TB        Barriers to Rehab  medically complex;previous functional deficit;physical barrier poorly fitting L LE prosthesis  -TB           Relationship/Environment    Primary Source of Support/Comfort  spouse  -TB        Lives With  spouse  -TB        Family Caregiver if Needed  spouse  -TB        Concerns About Impact on Relationships  Pt has access to caregivers up to 40 hr/week  -TB           Resource/Environmental Concerns    Current Living Arrangements  home/apartment/condo  -TB           Cognitive Assessment/Interventions    Additional Documentation  Cognitive Assessment/Intervention (Group)  -TB           Cognitive Assessment/Intervention- PT/OT    Affect/Mental Status (Cognitive)  agitated  -TB        Orientation Status (Cognition)  oriented to;person;place;situation  -TB        Follows Commands (Cognition)  over 90% accuracy;verbal cues/prompting required  -TB        Cognitive Function (Cognitive)  safety deficit  -TB        Safety Deficit (Cognitive)  insight into deficits/self awareness reluctant to participate in IP therapy, wants  therapy  -TB        Cognitive Interventions (Cognitive)  occupation/activity based interventions  -TB        Personal Safety Interventions  fall prevention program maintained  -TB        Cognitive Assessment/Intervention Comment  Mild frustration with therapy assessment noted  -TB           Safety Issues, Functional Mobility    Safety Issues Affecting Function (Mobility)  insight into deficits/self awareness;awareness of need for assistance;judgment  -TB        Impairments Affecting Function (Mobility)  strength;range of motion (ROM);postural/trunk control;balance;endurance/activity tolerance;pain  -TB           Bed Mobility  Assessment/Treatment    Comment (Bed Mobility)  Refused  -TB           Functional Mobility    Functional Mobility- Ind. Level  unable to perform  -TB        Functional Mobility- Comment  h/o L BKA with poorly fitting prosthesis  -TB           Transfer Assessment/Treatment    Comment (Transfers)  Refused - clinical judgement is dependent mechanical-lift transfer  -TB           ADL Assessment/Intervention    BADL Assessment/Intervention  lower body dressing;upper body dressing;grooming;feeding  -TB           Upper Body Dressing Assessment/Training    Comment (Upper Body Dressing)  Family reports set-up at baseline  -TB           Lower Body Dressing Assessment/Training    Lower Body Dressing [place] Level  maximum assist (25% patient effort)  -TB           Grooming Assessment/Training    [place] Level (Grooming)  set up  -TB           Self-Feeding Assessment/Training    [place] Level (Feeding)  set up  -TB           BADL Safety/Performance    Impairments, BADL Safety/Performance  balance;endurance/activity tolerance;pain;range of motion;strength;trunk/postural control  -TB           General ROM    GENERAL ROM COMMENTS  AROM grossly WFL for ADLs - strength deficits limit full ROM at shoulders  -TB           MMT (Manual Muscle Testing)    General MMT Comments  Significant generalized weakness, B UE functionally 3-/5  -TB           Motor Assessment/Interventions    Additional Documentation  Therapeutic Exercise (Group);Balance (Group)  -TB           Therapeutic Exercise    Therapeutic Exercise  supine, upper extremities  -TB        Additional Documentation  Therapeutic Exercise (Row)  -TB           Upper Extremity Supine Therapeutic Exercise    Performed, Supine Upper Extremity (Therapeutic Exercise)  shoulder flexion/extension;shoulder abduction/adduction;shoulder external/internal rotation;shoulder horizontal abduction/adduction;elbow flexion/extension  -TB        Exercise Type, Supine Upper Extremity  (Therapeutic Exercise)  AROM (active range of motion)  -TB        Restrictions, Supine Upper Extremity (Therapeutic Exercise)  significant generalized weakness  -TB        Comment, Supine Upper Extremity (Therapeutic Exercise)  Reluctant to participate  -TB           Sensory Assessment/Intervention    Sensory General Assessment  light touch sensation deficits identified  -TB           Light Touch Sensation Assessment    Left Upper Extremity: Light Touch Sensation Assessment  moderate impairment, 50 to 74% correct responses  -TB        Comment, Left Upper Extremity: Light Touch Sensation Assessment  peripheral neuropathy fingertips to shoulder  -TB        Right Upper Extremity: Light Touch Sensation Assessment  moderate impairment, 50 to 74% correct responses  -TB        Comment, Right Upper Extremity: Light Touch Sensation Assessment  peripheral neuropathy fingertips to shoulder  -TB           Positioning and Restraints    Pre-Treatment Position  in bed  -TB        Post Treatment Position  bed  -TB        In Bed  fowlers;call light within reach;encouraged to call for assist;with family/caregiver  -TB           Pain Assessment    Additional Documentation  Pain Scale: FACES Pre/Post-Treatment (Group)  -TB           Pain Scale: FACES Pre/Post-Treatment    Pain: FACES Scale, Pretreatment  0-->no hurt  -TB        Pain: FACES Scale, Post-Treatment  0-->no hurt  -TB        Pre/Post Treatment Pain Comment  Pt declined assist to reposition  -TB           Wound 08/02/19 1800 perineum incision    Wound - Properties Group Date first assessed: 08/02/19  -LN Time first assessed: 1800  -LN Location: perineum  -LN Type: incision  -LN       Coping    Observed Emotional State  accepting;cooperative with encouragement  -TB        Verbalized Emotional State  acceptance  -TB           Plan of Care Review    Plan of Care Reviewed With  patient;family  -TB           Clinical Impression (OT)    Date of Referral to OT  08/12/19  -TB         OT Diagnosis  Impaired mobility and ADL  -TB        Patient/Family Goals Statement (OT Eval)  pt declines interest in rehab; requests home with HH  -TB        Criteria for Skilled Therapeutic Interventions Met (OT Eval)  yes;treatment indicated  -TB        Rehab Potential (OT Eval)  fair, will monitor progress closely  -TB        Therapy Frequency (OT Eval)  daily pending participation  -TB        Care Plan Review (OT)  evaluation/treatment results reviewed;care plan/treatment goals reviewed;risks/benefits reviewed;patient/other agree to care plan  -TB        Care Plan Review, Other Participant (OT Eval)  family  -TB        Anticipated Equipment Needs at Discharge (OT)  -- None  -TB        Anticipated Discharge Disposition (OT)  inpatient rehabilitation facility  -TB           Vital Signs    Pre Systolic BP Rehab  -- RN cleared OT  -TB        Pre Patient Position  Supine  -TB        Intra Patient Position  Supine  -TB        Post Patient Position  Supine  -TB           Planned OT Interventions    Planned Therapy Interventions (OT Eval)  strengthening exercise;occupation/activity based interventions;BADL retraining  -TB           OT Goals    Transfer Goal Selection (OT)  transfer, OT goal 1  -TB        Bathing Goal Selection (OT)  bathing, OT goal 1  -TB        Grooming Goal Selection (OT)  grooming, OT goal 1  -TB        Strength Goal Selection (OT)  strength, OT goal 1  -TB           Transfer Goal 1 (OT)    Activity/Assistive Device (Transfer Goal 1, OT)  sit-to-stand/stand-to-sit;bed-to-chair/chair-to-bed;walker, rolling  -TB        [place] Level/Cues Needed (Transfer Goal 1, OT)  moderate assist (50-74% patient effort);2 person assist  -TB        Time Frame (Transfer Goal 1, OT)  by discharge  -TB        Barriers (Transfers Goal 1, OT)  L BKA with poorly fitting prosthesis  -TB        Progress/Outcome (Transfer Goal 1, OT)  goal ongoing  -TB           Bathing Goal 1 (OT)    Activity/Assistive Device (Bathing  Goal 1, OT)  upper body bathing  -TB        [place] Level/Cues Needed (Bathing Goal 1, OT)  minimum assist (75% or more patient effort)  -TB        Time Frame (Bathing Goal 1, OT)  by discharge  -TB        Barriers (Bathing Goal 1, OT)  Sponge bath sitting UIC  -TB        Progress/Outcomes (Bathing Goal 1, OT)  goal ongoing  -TB           Grooming Goal 1 (OT)    Activity/Device (Grooming Goal 1, OT)  grooming skills, all  -TB        [place] (Grooming Goal 1, OT)  set-up required  -TB        Time Frame (Grooming Goal 1, OT)  by discharge  -TB        Barriers (Grooming Goal 1, OT)  sitting UIC  -TB           Strength Goal 1 (OT)    Strength Goal 1 (OT)  Pt actively participates in daily HEP to support transfers and ADL performance. Demonstrates understanding for theraband HEP 3x10 reps daily  -TB        Time Frame (Strength Goal 1, OT)  by discharge  -TB        Progress/Outcome (Strength Goal 1, OT)  goal ongoing  -TB           Living Environment    Home Accessibility  tub/shower is not walk in ramp, built-in tub bench  -TB          User Key  (r) = Recorded By, (t) = Taken By, (c) = Cosigned By    Initials Name Effective Dates    TB Areli Nichols OT 06/08/18 -     Louann Carbajal RN 10/16/17 -          Occupational Therapy Education     Title: PT OT SLP Therapies (Done)     Topic: Occupational Therapy (Done)     Point: ADL training (Done)     Description: Instruct learner(s) on proper safety adaptation and remediation techniques during self care or transfers.   Instruct in proper use of assistive devices.    Learning Progress Summary           Patient Acceptance, E, VU,NR by TB at 8/13/2019  9:57 AM    Comment:  Education initiated for benefits of OOB activity/therapy, role of OT, benefits of HEP to support transfer/ADL performance and recommendation for rehab at d/c.   Family Acceptance, E, VU,NR by TB at 8/13/2019  9:57 AM    Comment:  Education initiated for benefits of OOB activity/therapy,  role of OT, benefits of HEP to support transfer/ADL performance and recommendation for rehab at d/c.                   Point: Home exercise program (Done)     Description: Instruct learner(s) on appropriate technique for monitoring, assisting and/or progressing therapeutic exercises/activities.    Learning Progress Summary           Patient Acceptance, JOYCE, RODOLFO,NR by  at 8/13/2019  9:57 AM    Comment:  Education initiated for benefits of OOB activity/therapy, role of OT, benefits of HEP to support transfer/ADL performance and recommendation for rehab at d/c.   Family Acceptance, E, RODOLFO,NR by TB at 8/13/2019  9:57 AM    Comment:  Education initiated for benefits of OOB activity/therapy, role of OT, benefits of HEP to support transfer/ADL performance and recommendation for rehab at d/c.                               User Key     Initials Effective Dates Name Provider Type Discipline     06/08/18 -  Areli Nichols, OT Occupational Therapist OT                  OT Recommendation and Plan  Outcome Summary/Treatment Plan (OT)  Anticipated Equipment Needs at Discharge (OT): (None)  Anticipated Discharge Disposition (OT): inpatient rehabilitation facility  Planned Therapy Interventions (OT Eval): strengthening exercise, occupation/activity based interventions, BADL retraining  Therapy Frequency (OT Eval): daily(pending participation)  Plan of Care Review  Plan of Care Reviewed With: patient, family  Plan of Care Reviewed With: patient, family  Outcome Summary: OT IE completed. Pt presents with significant weakness limiting transfers and ADL performance. Reluctant to participate in IP therapy, but requests HH. OT to follow IP pending participation. Pt would benefit from IRF at d/c for best outcome.     Outcome Measures     Row Name 08/13/19 0910             How much help from another is currently needed...    Putting on and taking off regular lower body clothing?  1  -TB      Bathing (including washing, rinsing, and  drying)  2  -TB      Toileting (which includes using toilet bed pan or urinal)  1  -TB      Putting on and taking off regular upper body clothing  2  -TB      Taking care of personal grooming (such as brushing teeth)  3  -TB      Eating meals  3  -TB      AM-PAC 6 Clicks Score (OT)  12  -TB         Functional Assessment    Outcome Measure Options  AM-PAC 6 Clicks Daily Activity (OT)  -TB        User Key  (r) = Recorded By, (t) = Taken By, (c) = Cosigned By    Initials Name Provider Type    Areli Lemus OT Occupational Therapist          Time Calculation:   Time Calculation- OT     Row Name 08/13/19 1000             Time Calculation- OT    OT Start Time  0910  -TB      OT Received On  08/13/19  -TB      OT Goal Re-Cert Due Date  08/23/19  -TB        User Key  (r) = Recorded By, (t) = Taken By, (c) = Cosigned By    Initials Name Provider Type    Areli Lemus OT Occupational Therapist        Therapy Charges for Today     Code Description Service Date Service Provider Modifiers Qty    [id number]  OT EVAL HIGH COMPLEXITY 3 8/13/2019 Areli Nichols OT GO 1               Areli Nichols OT  8/13/2019

## 2019-08-13 NOTE — PROGRESS NOTES
Continued Stay Note   [facility]     Patient Name: Kendrick Crystal  MRN: [id number]  Today's Date: 8/13/2019    Admit Date: 7/30/2019    Discharge Plan     Row Name 08/13/19 1348       Plan    Plan  Plan is currently home with home health    Patient/Family in Agreement with Plan  yes    Plan Comments  Met with patient and spouse at bedside.  Patient is up in the chair.  Discussed the need for rehab prior to home.  Patient and wife state they will go home with home health physical therapy.  He has chosen [facility].  Encouraged patient and wife to think about rehab as it would be very beneficial.  Following.     Final Discharge Disposition Code  06 - home with home health care        Discharge Codes    No documentation.       Expected Discharge Date and Time     Expected Discharge Date Expected Discharge Time    Aug 12, 2019             Magalie Oropeza RN

## 2019-08-13 NOTE — PROGRESS NOTES
[facility] Progress Note        Antibiotics:  Anti-Infectives (From admission, onward)    Ordered     Dose/Rate Route Frequency Start Stop    08/12/19 1019  cefuroxime (CEFTIN) 500 MG tablet     Ordering Provider:  Jazmín Garcia MD    500 mg Oral Daily 08/13/19 0000 08/20/19 2359    08/12/19 1019  metroNIDAZOLE (FLAGYL) 500 MG tablet     Ordering Provider:  Jazmín Garcia MD    500 mg Oral 2 Times Daily 08/13/19 0000 08/20/19 2359    08/09/19 1224  micafungin 100 mg/100 mL 0.9% NS IVPB (mbp)     Ordering Provider:  Sandra White MD    100 mg  over 60 Minutes Intravenous Every 24 Hours 08/09/19 1400 08/19/19 1359    08/06/19 1426  cefTRIAXone (ROCEPHIN) 1 g/100 mL 0.9% NS (MBP)     Ordering Provider:  Shahbaz Valdivia MD    1 g  over 30 Minutes Intravenous Every 24 Hours 08/06/19 1600 08/20/19 1559    08/06/19 1426  metroNIDAZOLE (FLAGYL) 500 mg/100mL IVPB     Ordering Provider:  Shahbaz Valdivia MD    500 mg  over 60 Minutes Intravenous Every 8 Hours 08/06/19 1600 08/20/19 1559    07/31/19 0955  micafungin 100 mg/100 mL 0.9% NS IVPB (mbp)     Ordering Provider:  Usman Dong MD    100 mg  over 60 Minutes Intravenous Every 24 Hours 07/31/19 1100 08/09/19 1328    07/30/19 2051  [MAR Hold]  piperacillin-tazobactam (ZOSYN) 3.375 g in iso-osmotic dextrose 50 ml (premix)     (MAR Hold since 08/02/19 1447)   Comments:  First dose given in ED.   Ordering Provider:  Keyanna Aponte APRN    3.375 g  over 4 Hours Intravenous Every 8 Hours 07/31/19 0100 08/03/19 0059    07/30/19 1820  vancomycin 2250 mg/500 mL 0.9% NS IVPB ([facility])     Ordering Provider:  Kendrick Sotelo MD    20 mg/kg × 113 kg  over 3 Hours Intravenous Once 07/30/19 1822 07/30/19 2155    07/30/19 1820  piperacillin-tazobactam (ZOSYN) 3.375 g in iso-osmotic dextrose 50 ml (premix)     Ordering Provider:  Kendrick Sotelo MD    3.375 g  over 30 Minutes Intravenous Once 07/30/19 1822 07/30/19 1924          CC: rectal pain    HPI:    Patient is a 48  y.o.  Yr old male with history of poorly contolled T2DM, DUNLAP/liver cirrhosis, HTN, PVD/Left BKA, and multiple skin abscesses/MRSA who presented to [facility] ED with right shin wound infection summer 2018.  He was unable to get to see Dr. Martinez as his father passed away unexpectedly on 18 and he had to wait until after the .  The wound worsened becoming gangrenous and he came to [facility] ED in 2018.  He also had been hospitalized at [facility] and then transferred to  for a severe penis/scrotum infection requiring surgical debridement in summer 2018.  He received antibiotics regarding his right leg infection, generally improved over the remainder of summer 2018 but that area had not completely healed. He was admitted 2019 with acute left lower abdominal wall redness/swelling and pain, spontaneous drainage associated with fever/chills and uncontrolled blood sugars.   I&D requiring wide debridement , severe/deep infection and transferred to [facility] on May 3 with IV Zosyn/oral doxycycline. Cultures had lactobacillus and mixed gram-positive skin sherly including alpha strep, staph epidermidis and corynebacterium. Readmitted to [facility] May 18, 2019, increasing generalized edema ultimately transitioned to oral doxycycline and healed.     He presented to the emergency room 2019 with acute rectal pain that had begun ; this is associated with constipation for days, chills and nausea/dry heaving.  White blood cell count elevated, high hemoglobin A1c over 13, urinalysis with hematuria/pyuria and CT scan with 3 x 3 cm fluid collection anterior to the distal rectum and per discussion with Dr. Akbar/Jennifer, this is not in the prostate.  Colorectal surgery/urology saw him for consideration of surgical options/timing/threshold, etc..     19 I&Dper Dr Payne perirectal abscess; patient reports that he had instrumented his rectum prior to hospitalization  with enema    8/3/19 urinary retention overnight requiring in/out catheterization per patient.  Creat climb    8/4/19 further creat climb; childs placed and lisinopril stopped and d/w Dr Rubio    8/7 in retrospect he remembers air in his urine at admit which has raised concern for possible fistula from urinary tract;  No fecaluria and culture from abscess is fecal sherly;  ?rectal-urethral fistula;  Dr Payne aware     8/13/19 he has dry heaves, and he reports fatigue and some perirectal pain but not progressive , currently dull, nonradiating, better with pain meds, 3 out of 10 at present.  He denies hematochezia melena or hematemesis.  No diarrhea.  Recent Constipation as above.  No  hematuria or pyuria.       No headache photophobia or neck stiffness.  No shortness of breath cough or hemoptysis.  No skin rash.       ROS:      8/13/19 No f/c/s. No n/v/d. No rash. No new ADR to Abx.       Constitutional--appetite diminished with malaise.  No sweats   Heent-- No new vision, hearing or throat complaints.  No epistaxis or oral sores.  Denies odynophagia or dysphagia.  No flashers, floaters or eye pain. No odynophagia or dysphagia. No headache, photophobia or neck stiffness.  CV-- No chest pain, palpitation or syncope  Resp-- No SOB/cough/Hemoptysis  GI- No  hematochezia, melena, or hematemesis. Denies jaundice ; he has chronic liver disease  --as above.  No dysuria, hematuria, or flank pain.  Denies hesitancy, urgency or flank pain.  Lymph- no swollen lymph nodes in neck/axilla or groin.   Heme- No active bruising or bleeding; no Hx of DVT or PE.  MS-- no swelling or pain in the bones or joints of arms/legs.  No new back pain.  Neuro-- No acute focal weakness or numbness in the arms or legs.  No seizures.     Full 12 point review of systems reviewed and negative otherwise for acute complaints, except for above          PE:   /83 (BP Location: Right arm, Patient Position: Lying)   Pulse 66   Temp 98.9 °F (37.2  "°C) (Oral)   Resp 18   Ht 190.5 cm (75\")   Wt 113 kg (250 lb)   SpO2 94%   BMI 31.25 kg/m²       GENERAL: sleepy, in no acute distress.   HEENT: Normocephalic, atraumatic.  PERRL. EOMI. No conjunctival injection. No icterus. Oropharynx clear without evidence of thrush or exudate. No evidence of peridontal disease.    NECK: Supple without nuchal rigidity. No mass.  LYMPH: No cervical, axillary or inguinal lymphadenopathy.  HEART: RRR; No murmur, rubs, gallops.   LUNGS: Clear to auscultation bilaterally without wheezing, rales, rhonchi. Normal respiratory effort. Nonlabored. No dullness.  ABDOMEN: Soft, nontender, nondistended. Positive bowel sounds. No rebound or guarding. NO mass or HSM.  EXT:  No cyanosis, clubbing or edema. No cord.  : Genitalia generally unremarkable.  Unable to elicit any perineal tenderness.  No discrete mass bulge or fluctuance.  No crepitus or bulla.  I am unable to elicit any perianal tenderness with no obvious bulge or fluctuance there either.  Slight candidiasis  MSK: FROM without joint effusions noted arms/legs.    SKIN: Warm and dry without cutaneous eruptions on Inspection/palpation.    NEURO: sleepy     No peripheral stigmata/phenomena of endocarditis           Laboratory Data    Results from last 7 days   Lab Units 08/12/19  0627 08/11/19  0603 08/10/19  0703   WBC 10*3/mm3 13.70* 11.25* 12.73*   HEMOGLOBIN g/dL 11.2* 10.6* 10.6*   HEMATOCRIT % 34.9* 33.5* 33.2*   PLATELETS 10*3/mm3 255 248 242     Results from last 7 days   Lab Units 08/13/19  0410   SODIUM mmol/L 141  139   POTASSIUM mmol/L 3.9  3.9   CHLORIDE mmol/L 100  99   CO2 mmol/L 21.0*  25.0   BUN mg/dL 50*  49*   CREATININE mg/dL 3.00*  3.24*   GLUCOSE mg/dL 193*  195*   CALCIUM mg/dL 8.4*  8.1*     Results from last 7 days   Lab Units 08/13/19  0410   ALK PHOS U/L 196*   BILIRUBIN mg/dL <0.2*   ALT (SGPT) U/L 12   AST (SGOT) U/L 16               Estimated Creatinine Clearance: 37 mL/min (A) (by C-G formula " based on SCr of 3.24 mg/dL (H)).      Microbiology:      Radiology:  Imaging Results (last 72 hours)     Procedure Component Value Units Date/Time    CT Abdomen Pelvis With Contrast [[id number]] Collected:  07/30/19 1749     Updated:  07/31/19 1034    Narrative:       EXAMINATION: CT ABDOMEN PELVIS W CONTRAST- 07/30/2019      INDICATION: lower abd pain     TECHNIQUE: CT scan of the abdomen and pelvis was performed with  intravenous contrast.     The radiation dose reduction device was turned on for each scan per the  ALARA (As Low as Reasonably Achievable) protocol.     COMPARISON: 05/30/2019     FINDINGS: The most superior images demonstrate no basilar inflammatory  inflammatory process or pleural effusion. Small effusions have resolved  since previous examination. The liver and spleen are normal. There are  small bilateral fat-containing adrenal nodules consistent bilateral  myolipomas, unchanged. The pancreas is normal. There is no renal mass,  stone or obstruction. There is no ascites,  aneurysm or retroperitoneal  lymphadenopathy. There is diffuse thickening of the bladder wall,  probably related to the peroneal abscess. There is no inguinal  lymphadenopathy. There are sigmoid diverticula without features of  diverticulitis. The appendix is normal.     Just inferior to the prostate gland and anterior to the distal rectum  there is a 3.0 x 3.2 cm fluid collection with enhancing margins  consistent with an abscess.       Impression:       There is a 3.0 x 3.2 cm fluid collection with enhancing  margins consistent with an abscess related to the inferior aspect of the  prostate gland and anterior to the distal rectum.     D:  07/30/2019  E:  07/31/2019     This report was finalized on 7/31/2019 10:31 AM by Dr. Guillermo Akbar MD.               Impression:     --Acute sepsis per internal medicine admission note, concern for abscess in the perirectal tissue as etiology.  Empiric antibiotics ongoing with risk for mixed  infection.  drainage 8/2; culture data pending.  Broad-spectrum antimicrobials ongoing.     --Acute perirectal abscess associated with constipation as above, air in urine as above.  Colorectal surgery, Dr. Payne following to help guide timing/options/threshold for further drainage if needed.  Drainage as above on August 2 ;  Mixed Fecal sherly in culture     --History urinary retention.    Recurrent retention overnight August 2-August 3.  Medicine following to help guide further work-up, urology input, etc.    --ARF 8/3-8/13;  ?obstruction initially associated with retention postop (1200 out with I/O cath per nursing ) -v- contrast from CT -v- lisinopril/medication/vancomycin -v- relative hypotension -v- likely combination of factors with ATN; nephrology following; vancomycin trough high and vancomycin stopped empirically 8/3 although high trough potentially accumulation as a consequence of diminishing renal function associated with retention/contrast/pressure rather than cause.  Nonetheless, avoid for now; creatinine trending down.    --Cutaneous candidiasis and yeast in gram stain of abscess.  Mycamine added     --History MRSA;  Not in current culture     --Diabetes mellitus type 2, uncontrolled.  He reports the reason for this is forgetfulness     --History Johnston and chronic liver disease per past notes     --Left BKA     --Peripheral vascular disease    --hyperkalemia per medicine/nephrology        PLAN:    --IV rocephin/flagyl/ Mycamine     --Check/review labs cultures and scans     --Discussed with microbiology     --History per nursing staff     --Discussed with Dr. Patel     --Discussed with family, partial history per them     --Highly complex set of issues with high risk for further serious morbidity and other serious sequela            Usman Dong MD  8/13/2019

## 2019-08-13 NOTE — PROGRESS NOTES
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 Wife in room reports patients oral intake has declined over the past couple days. States he has been dry heaving. Wife reports patient is limited with oral intake also due to current diet modifications.       Anthropometrics     Height: 190.5 cm (75\")  Last filed wt: Weight: (ht=75in, oa=007kh; BMI not applicable, given s/p BKA.) (08/06/19 1309)     Ideal Body Weight (IBW) (kg): 90.45  Admission wt: 250 lb  Method obtained: weight per charting 7/30        Labs reviewed     Results from last 7 days   Lab Units 08/13/19 0410 08/12/19 0627 08/11/19  0603 08/10/19  0703   GLUCOSE mg/dL 193*  195* 170* 103* 155*   BUN mg/dL 50*  49* 59* 66* 66*   CREATININE mg/dL 3.00*  3.24* 3.69* 4.48* 4.92*   SODIUM mmol/L 141  139 142 141 137   CHLORIDE mmol/L 100  99 101 105 106   POTASSIUM mmol/L 3.9  3.9 4.5 4.5 5.1   PHOSPHORUS mg/dL 6.1*  --  7.9* 8.2*   ALT (SGPT) U/L 12  --   --   --      Results from last 7 days   Lab Units 08/13/19 0410 08/11/19  0603 08/10/19  0703   ALBUMIN g/dL 2.90* 2.50* 2.50*     Lab Results   Lab Value Date/Time    HGBA1C 14.10 (H) 07/31/2019 0506    HGBA1C 13.43 (H) 07/12/2019 1020       Medications reviewed   Pertinent: amlodipine, rocephin, insulin, micafungin, probiotic, renvela, sodium bicarbonate,      Current Nutrition Prescription     PO: Diet " Soft Texture; Whole Foods; Consistent Carbohydrate; Low Phosphorus  Intake: PO intake from 8/8 - 8/11 reflects good oral intake - per patient/family, PO since 8/11 has been minimal      Nutrition Diagnosis     8/13  Problem Inadequate oral intake   Etiology Clinical status   Signs/Symptoms Report of minimal intake per patient and family x2 days       8/13  Problem Altered nutrition related laboratory values   Etiology ?dietary/lifestyle choices   Signs/Symptoms HgbA1c = 14.1%   Status: RD offered nutrition education 8/6, patient declined    Nutrition Intervention   1.  Follow treatment progress, Care plan reviewed  2.  Advise alternate selection, Interview for preferences   3. Menu adjusted to remove potassium restriction in order to liberalize diet order to increase PO intake. Phosphorus labs are trending down, will continue to monitor and assess renal function labs in order to increase diet restrictions as medically appropriate.  4. Supplement ordered - Boost Pudding Daily    Goal:   General: Nutrition support treatment  PO: Increase intake  Additional goals: consistently consuming > 67% of PO intake      Monitoring/Evaluation:   Per protocol, PO intake, Supplement intake      Will Continue to follow per protocol    Atiya Keys RDN, LD  Time Spent: 35 minutes

## 2019-08-13 NOTE — PROGRESS NOTES
"   LOS: 14 days    Patient Care Team:  Keyanna Leone APRN as PCP - General (Nurse Practitioner)    Reason For Visit:  F/U JUAN  Subjective       Poor appetite not been able to eat for the last 2 days even though he is having bowel movement  Review of Systems:    Pulm: No soa   CV:  No CP.    GI: NO N/V/D/ANOREXIA      Objective       amLODIPine 10 mg Oral Q24H   ceftriaxone 1 g Intravenous Q24H   DULoxetine 30 mg Oral Daily   erythromycin base 250 mg Oral TID With Meals   heparin (porcine) 5,000 Units Subcutaneous Q8H   hydrALAZINE 25 mg Oral Q8H   insulin detemir 30 Units Subcutaneous Q12H   insulin lispro 0-9 Units Subcutaneous 4x Daily With Meals & Nightly   insulin lispro 5 Units Subcutaneous TID With Meals   metoprolol tartrate 100 mg Oral Q12H   metroNIDAZOLE 500 mg Intravenous Q8H   micafungin (MYCAMINE)  mg Intravenous Q24H   saccharomyces boulardii 250 mg Oral BID   sevelamer 800 mg Oral TID With Meals   sodium bicarbonate 1,300 mg Oral BID   tamsulosin 0.8 mg Oral Nightly   terazosin 5 mg Oral Nightly            Vital Signs:  Blood pressure 173/83, pulse 66, temperature 98.9 °F (37.2 °C), temperature source Oral, resp. rate 18, height 190.5 cm (75\"), weight 113 kg (250 lb), SpO2 94 %.    Flowsheet Rows      First Filed Value   Admission Height  190.5 cm (75\") Documented at 07/30/2019 1623   Admission Weight  113 kg (250 lb) Documented at 07/30/2019 1623          08/12 0701 - 08/13 0700  In: 300   Out: 700 [Urine:700]    Physical Exam:    General Appearance: NAD, alert and cooperative, Ox3  Eyes: PER, conjunctivae and sclerae normal, no icterus  Lungs: respirations regular and unlabored, no crepitus, clear to auscultation  Heart/CV: regular rhythm & normal rate, no murmur, no gallop, no rub and no edema  Abdomen: not distended, soft, non-tender, no masses,  bowel sounds present  Skin: No rash, Warm and dry    Radiology:            Labs:  Results from last 7 days   Lab Units 08/12/19  0627 " 08/11/19  0603 08/10/19  0703   WBC 10*3/mm3 13.70* 11.25* 12.73*   HEMOGLOBIN g/dL 11.2* 10.6* 10.6*   HEMATOCRIT % 34.9* 33.5* 33.2*   PLATELETS 10*3/mm3 255 248 242     Results from last 7 days   Lab Units 08/13/19  0410 08/12/19  0627 08/11/19  0603 08/10/19  0703   SODIUM mmol/L 141  139 142 141 137   POTASSIUM mmol/L 3.9  3.9 4.5 4.5 5.1   CHLORIDE mmol/L 100  99 101 105 106   CO2 mmol/L 21.0*  25.0 25.0 20.0* 17.0*   BUN mg/dL 50*  49* 59* 66* 66*   CREATININE mg/dL 3.00*  3.24* 3.69* 4.48* 4.92*   CALCIUM mg/dL 8.4*  8.1* 8.3* 8.0* 7.8*   PHOSPHORUS mg/dL 6.1*  --  7.9* 8.2*   ALBUMIN g/dL 2.90*  --  2.50* 2.50*     Results from last 7 days   Lab Units 08/13/19  0410   GLUCOSE mg/dL 193*  195*       Results from last 7 days   Lab Units 08/13/19  0410   ALK PHOS U/L 196*   BILIRUBIN mg/dL <0.2*   ALT (SGPT) U/L 12   AST (SGOT) U/L 16                 Estimated Creatinine Clearance: 37 mL/min (A) (by C-G formula based on SCr of 3.24 mg/dL (H)).      Assessment       DUNLAP Cirrhosis    Essential hypertension    Non-compliance    Hyperlipemia    Type 2 diabetes mellitus with circulatory disorder and uncontrolled    History of MRSA infection    JUAN (acute kidney injury) (CMS/[facility])    S/P BKA (below knee amputation), left (CMS/[facility])    Peripheral vascular disease (CMS/[facility])    DM (diabetes mellitus) type II uncontrolled, periph vascular disorder (CMS/[facility])            Impression:   JUAN WITH IMPROVING GFR, slow.   HYPERPHOSPHATEMIA IMPROVING ON BINDER.   ANEMIA.      Recommendations:  I will add oral erythromycin to help in gastroparesis  Continue current  management  Kidney function slowly improving    Jaime Rubio MD  08/13/19  1:06 PM

## 2019-08-13 NOTE — PLAN OF CARE
Problem: Patient Care Overview  Goal: Plan of Care Review  Outcome: Ongoing (interventions implemented as appropriate)   19   Coping/Psychosocial   Plan of Care Reviewed With patient;spouse   Plan of Care Review   Progress improving   OTHER   Outcome Summary Pt has had prolonged nausea throughout shift and increasing BPs. Dr. Garcia contacted on 2 occasions and new medications were prescibed (see MAR). Alert and oriented x4. Pt was unhappy with being educated on diabetic diet and why soda increased blood glucose levels. BG elevated throughout shift in the 200s. Pt denied pain throughout shift. Pt IV was changed due r/t  old site. Pt slept for remainder of shift after administration of phenergan for nausea. Will continue to monitor at 19.       Problem: Skin Injury Risk (Adult)  Goal: Identify Related Risk Factors and Signs and Symptoms  Outcome: Ongoing (interventions implemented as appropriate)   19   Skin Injury Risk (Adult)   Related Risk Factors (Skin Injury Risk) body weight extremes;hospitalization prolonged;edema;infection;mobility impaired;nutritional deficiencies     Goal: Skin Health and Integrity  Outcome: Ongoing (interventions implemented as appropriate)   19   Skin Injury Risk (Adult)   Skin Health and Integrity making progress toward outcome       Problem: Pain, Chronic (Adult)  Goal: Identify Related Risk Factors and Signs and Symptoms  Outcome: Ongoing (interventions implemented as appropriate)   19   Pain, Chronic (Adult)   Related Risk Factors (Chronic Pain) disease process;physical disability;procedures/treatments   Signs and Symptoms (Chronic Pain) fatigue/weakness     Goal: Acceptable Pain/Comfort Level and Functional Ability  Outcome: Ongoing (interventions implemented as appropriate)   19   Pain, Chronic (Adult)   Acceptable Pain/Comfort Level and Functional Ability making progress toward outcome       Problem: Pain,  Acute (Adult)  Goal: Acceptable Pain Control/Comfort Level  Outcome: Ongoing (interventions implemented as appropriate)   08/13/19 5497   Pain, Acute (Adult)   Acceptable Pain Control/Comfort Level making progress toward outcome

## 2019-08-13 NOTE — PLAN OF CARE
Problem: Patient Care Overview  Goal: Plan of Care Review  Outcome: Ongoing (interventions implemented as appropriate)   08/13/19 4067   OTHER   Outcome Summary OT IE completed. Pt presents with significant weakness limiting transfers and ADL performance. Reluctant to participate in IP therapy, but requests HH. OT to follow IP pending participation. Pt would benefit from IRF at d/c for best outcome.

## 2019-08-13 NOTE — PLAN OF CARE
Problem: Patient Care Overview  Goal: Plan of Care Review  Outcome: Ongoing (interventions implemented as appropriate)   08/13/19 0302   Coping/Psychosocial   Plan of Care Reviewed With patient   Plan of Care Review   Progress improving   OTHER   Outcome Summary Pt hasn't had any complaints of pain. Has had some nausea, controlled with PRN zofran. BP elevated. Wife at bedside and attentive to pt. Fall precautions maintained. Will continue to monitor       Problem: Skin Injury Risk (Adult)  Goal: Identify Related Risk Factors and Signs and Symptoms  Outcome: Ongoing (interventions implemented as appropriate)   08/10/19 1730   Skin Injury Risk (Adult)   Related Risk Factors (Skin Injury Risk) body weight extremes;edema;infection;mobility impaired;tissue perfusion altered     Goal: Skin Health and Integrity  Outcome: Ongoing (interventions implemented as appropriate)   08/13/19 0302   Skin Injury Risk (Adult)   Skin Health and Integrity making progress toward outcome

## 2019-08-13 NOTE — PROGRESS NOTES
[facility] Medicine Services  PROGRESS NOTE    Patient Name: Kendrick Crystal  : 1970  MRN: [id number]    Date of Admission: 2019  Length of Stay: 14  Primary Care Physician: Keyanna Leone APRN    Subjective   Subjective   CC:  Perirectal Abscess     HPI:      Resting in bed this morning with wife at bedside.  Feels poor this morning.  Reports inability to eat and constant dry heaves.  Down today because he had been feeling so much better and seems to be going backwards.      Review of Systems  Gen- No fevers, chills  CV- No chest pain, palpitations  Resp- No cough, dyspnea  GI- No N/V/D, abd pain    Objective   Objective   Vital Signs:   Temp:  [97 °F (36.1 °C)-98.9 °F (37.2 °C)] 98.9 °F (37.2 °C)  Heart Rate:  [66-79] 66  Resp:  [16-18] 18  BP: (160-198)/() 173/83  Physical Exam:    Constitutional: Awake, alert, laying in bed, chronically ill appearing, wife at bedside  HEENT: NCAT, MMM  Respiratory: Non-labored, CTAB, on room air  Cardiovascular: RRR, no murmurs  Gastrointestinal: Soft, NT, ND +BS  Musculoskeletal: OLGUIN; no LE edema right LE; right BKA  Neurologic: Speech clear, no focal deficits  Skin: No rashes on exposed skin  Psychiatric:  cooperative; flat affect    Results Reviewed:  I have personally reviewed current lab, radiology, and data and agree.  Results from last 7 days   Lab Units 19  0619  0603 08/10/19  0703   WBC 10*3/mm3 13.70* 11.25* 12.73*   HEMOGLOBIN g/dL 11.2* 10.6* 10.6*   HEMATOCRIT % 34.9* 33.5* 33.2*   PLATELETS 10*3/mm3 255 248 242     Results from last 7 days   Lab Units 19  0410 19  0627 19  0603   SODIUM mmol/L 141  139 142 141   POTASSIUM mmol/L 3.9  3.9 4.5 4.5   CHLORIDE mmol/L 100  99 101 105   CO2 mmol/L 21.0*  25.0 25.0 20.0*   BUN mg/dL 50*  49* 59* 66*   CREATININE mg/dL 3.00*  3.24* 3.69* 4.48*   GLUCOSE mg/dL 193*  195* 170* 103*   CALCIUM mg/dL 8.4*  8.1* 8.3* 8.0*   ALT (SGPT) U/L 12   --   --    AST (SGOT) U/L 16  --   --      Estimated Creatinine Clearance: 37 mL/min (A) (by C-G formula based on SCr of 3.24 mg/dL (H)).    Microbiology Results Abnormal     Procedure Component Value - Date/Time    Stool Culture - Stool, Per Rectum [[id number]] Collected:  08/10/19 0324    Lab Status:  Preliminary result Specimen:  Stool from Per Rectum Updated:  08/12/19 1707     Salmonella/Shigella Screen Final report     Result 1 Comment     Comment: No Salmonella or Shigella recovered.        E coli, Shiga toxin Assay Negative    Narrative:       Performed at:  [address]  [id number]  : Dl Laird PhD, Phone:  [phone]    Fecal Leukocytes - Stool, Per Rectum [[id number]]  (Normal) Collected:  08/10/19 0324    Lab Status:  Final result Specimen:  Stool from Per Rectum Updated:  08/10/19 0905     Fecal Leukocytes No WBC's Seen    Anaerobic Culture - Wound, Rectal Abscess [[id number]] Collected:  08/02/19 1620    Lab Status:  Final result Specimen:  Wound from Rectal Abscess Updated:  08/07/19 0914     Anaerobic Culture No anaerobes isolated at 5 days    Wound Culture - Wound, Rectal Abscess [[id number]]  (Abnormal)  (Susceptibility) Collected:  08/02/19 1620    Lab Status:  Final result Specimen:  Wound from Rectal Abscess Updated:  08/06/19 0922     Wound Culture Scant growth (1+) Escherichia coli      Scant growth (1+) Klebsiella pneumoniae ssp pneumoniae      Scant growth (1+) Normal Fecal Haily     Gram Stain Few (2+) WBCs seen      Occasional Yeast      Rare (1+) Gram negative bacilli    Narrative:       Specimen is an abscess - work up all GNRs per Dr. Dong ([facility] infection control).    Susceptibility      Escherichia coli     EYAD     Ampicillin Resistant     Ampicillin + Sulbactam Intermediate     Cefazolin Susceptible     Cefepime Susceptible     Ceftazidime Susceptible     Ceftriaxone Susceptible     Gentamicin Susceptible     Levofloxacin  Resistant     Piperacillin + Tazobactam Susceptible     Tetracycline Susceptible     Trimethoprim + Sulfamethoxazole Susceptible                Susceptibility      Klebsiella pneumoniae ssp pneumoniae     EYAD     Ampicillin Resistant     Ampicillin + Sulbactam Susceptible     Cefazolin Susceptible     Cefepime Susceptible     Ceftazidime Susceptible     Ceftriaxone Susceptible     Gentamicin Susceptible     Levofloxacin Susceptible     Piperacillin + Tazobactam Susceptible     Tetracycline Susceptible     Trimethoprim + Sulfamethoxazole Susceptible                    Blood Culture - Blood, Hand, Left [[id number]] Collected:  07/30/19 1845    Lab Status:  Final result Specimen:  Blood from Hand, Left Updated:  08/04/19 2145     Blood Culture No growth at 5 days    Blood Culture - Blood, Arm, Right [[id number]] Collected:  07/30/19 1840    Lab Status:  Final result Specimen:  Blood from Arm, Right Updated:  08/04/19 2145     Blood Culture No growth at 5 days    Urine Culture - Urine, Urine, Clean Catch [[id number]]  (Normal) Collected:  07/30/19 1725    Lab Status:  Final result Specimen:  Urine, Clean Catch Updated:  07/31/19 2019     Urine Culture No growth        Imaging Results (last 24 hours)     ** No results found for the last 24 hours. **        Results for orders placed during the hospital encounter of 05/18/19   Adult Transthoracic Echo Complete W/ Cont if Necessary Per Protocol    Narrative · Estimated EF = 60%.  · Mild mitral valve regurgitation is present  · Mild tricuspid valve regurgitation is present.  · Calculated right ventricular systolic pressure from tricuspid   regurgitation is 29 mmHg.        I have reviewed the medications:  Scheduled Meds:    amLODIPine 10 mg Oral Q24H   ceftriaxone 1 g Intravenous Q24H   DULoxetine 30 mg Oral Daily   erythromycin base 250 mg Oral TID With Meals   heparin (porcine) 5,000 Units Subcutaneous Q8H   hydrALAZINE 25 mg Oral Q8H   insulin detemir 30 Units Subcutaneous  "Q12H   insulin lispro 0-9 Units Subcutaneous 4x Daily With Meals & Nightly   insulin lispro 5 Units Subcutaneous TID With Meals   metoprolol tartrate 100 mg Oral Q12H   metroNIDAZOLE 500 mg Intravenous Q8H   micafungin (MYCAMINE)  mg Intravenous Q24H   saccharomyces boulardii 250 mg Oral BID   sevelamer 800 mg Oral TID With Meals   sodium bicarbonate 1,300 mg Oral BID   tamsulosin 0.8 mg Oral Nightly   terazosin 5 mg Oral Nightly     Continuous Infusions:     PRN Meds:.dextrose  •  dextrose  •  glucagon (human recombinant)  •  HYDROcodone-acetaminophen  •  ondansetron **OR** ondansetron  •  promethazine **OR** promethazine  •  sodium chloride    Assessment/Plan   Assessment / Plan     Active Hospital Problems    Diagnosis  POA   • DM (diabetes mellitus) type II uncontrolled, periph vascular disorder (CMS/Formerly [facility]) [E11.51, E11.65]  Yes   • Peripheral vascular disease (CMS/Formerly [facility]) [I73.9]  Yes   • S/P BKA (below knee amputation), left (CMS/Formerly [facility]) [Z89.512]  Not Applicable   • JUAN (acute kidney injury) (CMS/Formerly [facility]) [N17.9]  Unknown   • History of MRSA infection [Z86.14]  Yes   • Type 2 diabetes mellitus with circulatory disorder and uncontrolled [E11.59]  Yes   • Essential hypertension [I10]  Yes   • Hyperlipemia [E78.5]  Yes   • DUNLAP Cirrhosis [K74.60]  Yes   • Non-compliance [Z91.14]  Not Applicable      Resolved Hospital Problems    Diagnosis Date Resolved POA   • **Sepsis (CMS/[facility]) [A41.9] 08/12/2019 Yes   • Abscess [L02.91] 08/12/2019 Yes   • UTI (urinary tract infection) [N39.0] 08/12/2019 Yes   • Hyponatremia [E87.1] 08/12/2019 Yes     Brief Hospital Course to date:  Kendrick Crystal is a 48 year old gentleman with uncontrolled diabetes who presents with sepsis secondary to abscess inferior to the prostate and anterior to the rectum.    Sepsis, claudia-rectal abscess, UTI   --CRS following  -pt met sepsis criteria based on elevated lactic acid, leukocytosis, tachycardia and positive source  -CT abd/pel obtained: \"3.0 x 3.2 " "cm fluid collection with enhancing margins consistent with an abscess related to the inferior aspect of the prostate gland and anterior to the distal rectum.\"  -abscess drainage done 8/2, cultures with e coli and klebsiella.  Continue ceftriaxone, flagyl, micafungin  --Continue current abx tx per ID, transition to PO ceftin/flagyl x 1 week at discharge    JAUN  Metabolic acidosis  Hyperkalemia  --Creatinine improved today  -Renal following.  Multifactorial.    -Remains on valtessa for K+  --Nephrology added PO4 binder  --Follow up with NAL in [place] office after discharge (1-2 weeks with labs)  --AM labs    Nausea/vomiting  -continues to complain of nausea and dry heaves  -erythromycin added for gastroparesis by nephrology  -Will add phenergan today.    -push oral intake.     Hyponatremia  - Resolved, stable    Anemia  --hgb 10.6; stable  --AM labs     Uncontrolled T2DM, a1c>14%  -pt reports that he often \"forgets\" to take his routine medications; including his insulin and does not check his glucose daily  -blood sugar >600 on admission, but did not meet criteria for DKA, now improving with subQ insulin   --Blood sugar well controlled on current regimen     HTN  --labile, monitor   --have stopped lisinopril given JUAN  --monitor closely, norvasc increased 8/6  --hydralazine added this morning.       DVT prophylaxis:  SCD- right leg, heparin       Disposition:  Plan to discharge in am if able to take in PO reliably and bp improved.    CODE STATUS:   Code Status and Medical Interventions:   Ordered at: 07/30/19 2051     Code Status:    CPR     Medical Interventions (Level of Support Prior to Arrest):    Full     Electronically signed by CHINTAN Robb, 08/13/19, 1:43 PM.      "

## 2019-08-13 NOTE — PLAN OF CARE
Problem: Patient Care Overview  Goal: Plan of Care Review  Outcome: Ongoing (interventions implemented as appropriate)   08/13/19 1502   Coping/Psychosocial   Plan of Care Reviewed With patient   OTHER   Outcome Summary PT eval completed. Pt. w/ limited participation, presents w/ impaired strength and functional decline. Skilled IPPT indicated to promote return to PLOF. Recommend IP rehab prior to return home, for best outcome.

## 2019-08-13 NOTE — THERAPY EVALUATION
Acute Care - Physical Therapy Initial Evaluation  [facility]     Patient Name: Kendrick Crystal  : 1970  MRN: [id number]  Today's Date: 2019   Onset of Illness/Injury or Date of Surgery: 19     Referring Physician: Jose      Admit Date: 2019    Visit Dx:     ICD-10-CM ICD-9-CM   1. Intra-abdominal abscess (CMS/Formerly [facility]) K65.1 567.22   2. Sepsis, due to unspecified organism (CMS/[facility]ly [facility]) A41.9 038.9     995.91   3. Hyperglycemia R73.9 790.29   4. Acute UTI N39.0 599.0   5. Abscess of rectum K61.1 566   6. Essential hypertension I10 401.9   7. Impaired mobility and ADLs Z74.09 799.89     Patient Active Problem List   Diagnosis   • DUNLAP Cirrhosis   • Essential hypertension   • Non-compliance   • Hyperlipemia   • Hypertriglyceridemia, essential   • Type 2 diabetes mellitus with circulatory disorder and uncontrolled   • History of MRSA infection   • JUAN (acute kidney injury) (CMS/[facility])   • S/P BKA (below knee amputation), left (CMS/[facility])   • Peripheral vascular disease (CMS/[facility])   • DM (diabetes mellitus) type II uncontrolled, periph vascular disorder (CMS/[facility])   • Obesity (BMI 30-39.9)     Past Medical History:   Diagnosis Date   • Abdominal wall cellulitis 2019   • JUAN (acute kidney injury) (CMS/[facility]) 2018   • Arthritis    • Bipolar 1 disorder (CMS/[facility]ly [facility])    • Cellulitis of right anterior lower leg 2018   • Cirrhosis (CMS/[facility])    • Counseling for insulin pump    • Depression    • Diabetes mellitus (CMS/[facility])    • Encephalopathy, hepatic (CMS/[facility])    • H/O degenerative disc disease    • History of Lissa's gangrene     right leg/testicle   • Hyperlipemia    • Hypertension    • multiple Skin abscesses    • DUNLAP, bx showed stage IV fibrosis    • Neuropathy    • Peripheral vascular disease (CMS/[facility])    • Thrombophlebitis      Past Surgical History:   Procedure Laterality Date   • ABDOMINAL WALL ABSCESS INCISION AND DRAINAGE N/A 2019    Procedure: ABDOMINAL WALL DEBRIDEMENT;  Surgeon:  Fadi Kern MD;  Location:  [facility];  Service: General   • AMPUTATION DIGIT Left 1/30/2017    Procedure: left fourth and fifth transmetatarsal toe amputation ;  Surgeon: Juancho Martinez MD;  Location:  [facility];  Service:    • BELOW KNEE AMPUTATION Left 3/2/2017    Procedure: AMPUTATION BELOW KNEE, SHMUEL;  Surgeon: Juancho Martinez MD;  Location:  [facility];  Service:    • ENDOSCOPY     • HEMORRHOIDECTOMY N/A 8/2/2019    Procedure: EXCISION AND DRAIN PERIRECTAL ABSCESS;  Surgeon: Mumtaz Payne MD;  Location:  [facility];  Service: General   • LEG SURGERY     • TESTICLE SURGERY Right     DEBRIDEMENT FROM GANGRENE   • TONSILLECTOMY  1975        PT ASSESSMENT (last 12 hours)      Physical Therapy Evaluation    No documentation.       Physical Therapy Education     Title: PT OT SLP Therapies (Done)     Topic: Physical Therapy (Done)     Point: Mobility training (Done)     Learning Progress Summary           Patient Acceptance, E,D, VU,NR by MB at 8/13/2019  3:01 PM    Comment:  home safety, benefits of mobility, D/C recommendations   Family Acceptance, E,D, VU,NR by MB at 8/13/2019  3:01 PM    Comment:  home safety, benefits of mobility, D/C recommendations                   Point: Home exercise program (Done)     Learning Progress Summary           Patient Acceptance, E,D, VU,NR by MB at 8/13/2019  3:01 PM    Comment:  home safety, benefits of mobility, D/C recommendations   Family Acceptance, E,D, VU,NR by MB at 8/13/2019  3:01 PM    Comment:  home safety, benefits of mobility, D/C recommendations                   Point: Body mechanics (Done)     Learning Progress Summary           Patient Acceptance, E,D, VU,NR by MB at 8/13/2019  3:01 PM    Comment:  home safety, benefits of mobility, D/C recommendations   Family Acceptance, E,D, VU,NR by MB at 8/13/2019  3:01 PM    Comment:  home safety, benefits of mobility, D/C recommendations                   Point: Precautions (Done)     Learning Progress Summary            Patient Acceptance, E,D, RODOLFO,NR by MB at 8/13/2019  3:01 PM    Comment:  home safety, benefits of mobility, D/C recommendations   Family Acceptance, E,D, VU,NR by MB at 8/13/2019  3:01 PM    Comment:  home safety, benefits of mobility, D/C recommendations                               User Key     Initials Effective Dates Name Provider Type Discipline    MB 03/14/16 -  Christi Lombardo, PT Physical Therapist PT              PT Recommendation and Plan  Anticipated Discharge Disposition (PT): inpatient rehabilitation facility  Planned Therapy Interventions (PT Eval): balance training, bed mobility training, gait training, home exercise program, patient/family education, strengthening, transfer training  Therapy Frequency (PT Clinical Impression): daily  Outcome Summary/Treatment Plan (PT)  Anticipated Discharge Disposition (PT): inpatient rehabilitation facility  Plan of Care Reviewed With: patient  Outcome Summary: PT eval completed.  Pt. w/ limited participation, presents w/ impaired strength and functional decline.  Skilled IPPT indicated to promote return to PLOF.  Recommend IP rehab prior to return home, for best outcome.  Outcome Measures     Row Name 08/13/19 0945 08/13/19 0910          How much help from another person do you currently need...    Turning from your back to your side while in flat bed without using bedrails?  3  -MB  --     Moving from lying on back to sitting on the side of a flat bed without bedrails?  3  -MB  --     Moving to and from a bed to a chair (including a wheelchair)?  2  -MB  --     Standing up from a chair using your arms (e.g., wheelchair, bedside chair)?  2  -MB  --     Climbing 3-5 steps with a railing?  1  -MB  --     To walk in hospital room?  1  -MB  --     AM-PAC 6 Clicks Score (PT)  12  -MB  --        How much help from another is currently needed...    Putting on and taking off regular lower body clothing?  --  1  -TB     Bathing (including washing, rinsing, and  drying)  --  2  -TB     Toileting (which includes using toilet bed pan or urinal)  --  1  -TB     Putting on and taking off regular upper body clothing  --  2  -TB     Taking care of personal grooming (such as brushing teeth)  --  3  -TB     Eating meals  --  3  -TB     AM-PAC 6 Clicks Score (OT)  --  12  -TB        Functional Assessment    Outcome Measure Options  AM-PAC 6 Clicks Basic Mobility (PT)  -MB  AM-PAC 6 Clicks Daily Activity (OT)  -       User Key  (r) = Recorded By, (t) = Taken By, (c) = Cosigned By    Initials Name Provider Type    TB Areli Nichols, OT Occupational Therapist    Christi Pereira, PT Physical Therapist         Time Calculation:   PT Charges     Row Name 08/13/19 0945             Time Calculation    Start Time  0945  -MB      PT Received On  08/13/19  -MB      PT Goal Re-Cert Due Date  08/23/19  -MB        User Key  (r) = Recorded By, (t) = Taken By, (c) = Cosigned By    Initials Name Provider Type    Christi Pereira, PT Physical Therapist        Therapy Charges for Today     Code Description Service Date Service Provider Modifiers Qty    29296546283 HC PT EVAL MOD COMPLEXITY 3 8/13/2019 Christi Lombardo, PT GP 1          PT G-Codes  Outcome Measure Options: AM-PAC 6 Clicks Basic Mobility (PT)  AM-PAC 6 Clicks Score (PT): 12  AM-PAC 6 Clicks Score (OT): 12      Christi Lombardo, CANDY  8/13/2019

## 2019-08-14 VITALS
HEART RATE: 74 BPM | SYSTOLIC BLOOD PRESSURE: 156 MMHG | DIASTOLIC BLOOD PRESSURE: 89 MMHG | TEMPERATURE: 98.4 F | WEIGHT: 250 LBS | HEIGHT: 75 IN | BODY MASS INDEX: 31.08 KG/M2 | OXYGEN SATURATION: 94 % | RESPIRATION RATE: 16 BRPM

## 2019-08-14 LAB
ANION GAP SERPL CALCULATED.3IONS-SCNC: 17 MMOL/L (ref 5–15)
BUN BLD-MCNC: 44 MG/DL (ref 6–20)
BUN/CREAT SERPL: 17.1 (ref 7–25)
CALCIUM SPEC-SCNC: 8.4 MG/DL (ref 8.6–10.5)
CAMPYLOBACTER STL CULT: NORMAL
CHLORIDE SERPL-SCNC: 96 MMOL/L (ref 98–107)
CO2 SERPL-SCNC: 26 MMOL/L (ref 22–29)
CREAT BLD-MCNC: 2.58 MG/DL (ref 0.76–1.27)
E COLI SXT STL QL IA: NEGATIVE
GFR SERPL CREATININE-BSD FRML MDRD: 26 ML/MIN/1.73
GLUCOSE BLD-MCNC: 234 MG/DL (ref 65–99)
GLUCOSE BLDC GLUCOMTR-MCNC: 162 MG/DL (ref 70–130)
GLUCOSE BLDC GLUCOMTR-MCNC: 212 MG/DL (ref 70–130)
Lab: NORMAL
Lab: NORMAL
POTASSIUM BLD-SCNC: 3.5 MMOL/L (ref 3.5–5.2)
SALM + SHIG STL CULT: NORMAL
SODIUM BLD-SCNC: 139 MMOL/L (ref 136–145)

## 2019-08-14 PROCEDURE — 25010000002 HEPARIN (PORCINE) PER 1000 UNITS: Performed by: INTERNAL MEDICINE

## 2019-08-14 PROCEDURE — 63710000001 INSULIN DETEMIR PER 5 UNITS: Performed by: INTERNAL MEDICINE

## 2019-08-14 PROCEDURE — 80048 BASIC METABOLIC PNL TOTAL CA: CPT | Performed by: INTERNAL MEDICINE

## 2019-08-14 PROCEDURE — 82962 GLUCOSE BLOOD TEST: CPT

## 2019-08-14 PROCEDURE — 99239 HOSP IP/OBS DSCHRG MGMT >30: CPT | Performed by: HOSPITALIST

## 2019-08-14 RX ORDER — METRONIDAZOLE 500 MG/1
500 TABLET ORAL 2 TIMES DAILY
Qty: 14 TABLET | Refills: 0 | Status: SHIPPED | OUTPATIENT
Start: 2019-08-14 | End: 2019-08-23 | Stop reason: HOSPADM

## 2019-08-14 RX ORDER — HYDRALAZINE HYDROCHLORIDE 50 MG/1
100 TABLET, FILM COATED ORAL EVERY 8 HOURS SCHEDULED
Status: DISCONTINUED | OUTPATIENT
Start: 2019-08-14 | End: 2019-08-14 | Stop reason: HOSPADM

## 2019-08-14 RX ORDER — CEFUROXIME AXETIL 500 MG/1
500 TABLET ORAL 2 TIMES DAILY
Qty: 14 TABLET | Refills: 0 | Status: SHIPPED | OUTPATIENT
Start: 2019-08-14 | End: 2019-08-23 | Stop reason: HOSPADM

## 2019-08-14 RX ORDER — ERYTHROMYCIN 250 MG/1
250 TABLET, COATED ORAL
Qty: 5 TABLET | Refills: 0 | Status: SHIPPED | OUTPATIENT
Start: 2019-08-14 | End: 2019-08-23 | Stop reason: HOSPADM

## 2019-08-14 RX ORDER — HYDRALAZINE HYDROCHLORIDE 100 MG/1
100 TABLET, FILM COATED ORAL EVERY 8 HOURS SCHEDULED
Qty: 90 TABLET | Refills: 0 | Status: SHIPPED | OUTPATIENT
Start: 2019-08-14 | End: 2019-09-13

## 2019-08-14 RX ADMIN — HEPARIN SODIUM 5000 UNITS: 5000 INJECTION INTRAVENOUS; SUBCUTANEOUS at 05:07

## 2019-08-14 RX ADMIN — HYDRALAZINE HYDROCHLORIDE 100 MG: 50 TABLET, FILM COATED ORAL at 16:05

## 2019-08-14 RX ADMIN — HYDRALAZINE HYDROCHLORIDE 50 MG: 50 TABLET, FILM COATED ORAL at 05:07

## 2019-08-14 RX ADMIN — SODIUM BICARBONATE 650 MG TABLET 1300 MG: at 08:47

## 2019-08-14 RX ADMIN — ERYTHROMYCIN 250 MG: 250 TABLET, FILM COATED ORAL at 08:47

## 2019-08-14 RX ADMIN — INSULIN LISPRO 4 UNITS: 100 INJECTION, SOLUTION INTRAVENOUS; SUBCUTANEOUS at 08:48

## 2019-08-14 RX ADMIN — METRONIDAZOLE 500 MG: 500 INJECTION, SOLUTION INTRAVENOUS at 08:47

## 2019-08-14 RX ADMIN — Medication 250 MG: at 08:48

## 2019-08-14 RX ADMIN — ERYTHROMYCIN 250 MG: 250 TABLET, FILM COATED ORAL at 12:09

## 2019-08-14 RX ADMIN — CARVEDILOL 25 MG: 12.5 TABLET, FILM COATED ORAL at 08:48

## 2019-08-14 RX ADMIN — INSULIN LISPRO 2 UNITS: 100 INJECTION, SOLUTION INTRAVENOUS; SUBCUTANEOUS at 12:09

## 2019-08-14 RX ADMIN — SEVELAMER CARBONATE 0.8 G: 0.8 POWDER, FOR SUSPENSION ORAL at 08:47

## 2019-08-14 RX ADMIN — DULOXETINE HYDROCHLORIDE 30 MG: 30 CAPSULE, DELAYED RELEASE ORAL at 08:48

## 2019-08-14 RX ADMIN — HYDROCODONE BITARTRATE AND ACETAMINOPHEN 1 TABLET: 10; 325 TABLET ORAL at 05:07

## 2019-08-14 RX ADMIN — AMLODIPINE BESYLATE 10 MG: 10 TABLET ORAL at 08:48

## 2019-08-14 RX ADMIN — SEVELAMER CARBONATE 0.8 G: 0.8 POWDER, FOR SUSPENSION ORAL at 12:09

## 2019-08-14 RX ADMIN — INSULIN DETEMIR 30 UNITS: 100 INJECTION, SOLUTION SUBCUTANEOUS at 08:52

## 2019-08-14 RX ADMIN — METRONIDAZOLE 500 MG: 500 INJECTION, SOLUTION INTRAVENOUS at 01:16

## 2019-08-14 NOTE — PROGRESS NOTES
Continued Stay Note  [facility]     Patient Name: Kendrick Crystal  MRN: [id number]  Today's Date: 8/14/2019    Admit Date: 7/30/2019    Discharge Plan     Row Name 08/14/19 0938       Plan    Plan  Home w/     Patient/Family in Agreement with Plan  yes    Plan Comments  Spoke with patient and spouse at bedside. Plan is still home w/ Counts include 234 beds at the [facility]. They are refusing rehab. They deny any needs at this time. CM will contiune to follow.    Final Discharge Disposition Code  06 - home with home health care        Discharge Codes    No documentation.       Expected Discharge Date and Time     Expected Discharge Date Expected Discharge Time    Aug 12, 2019             Fadi Valentine RN

## 2019-08-14 NOTE — DISCHARGE SUMMARY
[facility] Medicine Services  DISCHARGE SUMMARY    Patient Name: Kendrick Crystal  : 1970  MRN: [id number]    Date of Admission: 2019  Date of Discharge:  2019 (Wednesday)  Primary Care Physician: Keyanna Leone APRN    Consults     Date and Time Order Name Status Description    2019 1003 Inpatient Nephrology Consult Completed     2019 0955 Inpatient Urology Consult Completed     2019 0844 Inpatient Infectious Diseases Consult Completed     2019 0030 Inpatient Colorectal Surgery Consult Completed         Jaime Rubio MD - Nephrology  Foster Rodriguez MD - Urology  Usman Dong MD - Infectious Diseases  Mumtaz Payne MD - Colorectal Surgery - procedure in OR - trans-rectal drainage of claudia-rectal abscess    Hospital Course     Presenting Problem:   Sepsis (CMS/[facility]) [A41.9]    Active Hospital Problems    Diagnosis  POA   • DM (diabetes mellitus) type II uncontrolled, periph vascular disorder (CMS/[facility]) [E11.51, E11.65]  Yes   • Peripheral vascular disease (CMS/[facility]) [I73.9]  Yes   • S/P BKA (below knee amputation), left (CMS/[facility]) [Z89.512]  Not Applicable   • JUAN (acute kidney injury) (CMS/[facility]) [N17.9]  Unknown   • History of MRSA infection [Z86.14]  Yes   • Type 2 diabetes mellitus with circulatory disorder and uncontrolled [E11.59]  Yes   • Essential hypertension [I10]  Yes   • Hyperlipemia [E78.5]  Yes   • DUNLAP Cirrhosis [K74.60]  Yes   • Non-compliance [Z91.14]  Not Applicable      Resolved Hospital Problems    Diagnosis Date Resolved POA   • **Sepsis (CMS/HCC) [A41.9] 2019 Yes   • Abscess [L02.91] 2019 Yes   • UTI (urinary tract infection) [N39.0] 2019 Yes   • Hyponatremia [E87.1] 2019 Yes      Sepsis POA  Rectal Pain  Rectal Abscess  Long Term Uncontrolled DM  Acute Renal Failure  Gastroparesis    Hospital Course:  Kendrick Crystal is a 48 y.o. male admitted with rectal pain and concerns about rectal abscess.  She was  seen by Dr. Payne and Infectious Diseases.  He went to the OR for operation on 8/2 with Dr. Payne for Trans-rectal drainage of claudia-rectal abscess. He was on IV antimicrobials while here and was followed by ID.   He developed acute renal failure while here and was seen by Nephrology.  Several medications stopped including gabapentin, lisinopril, and lasix and he was placed on bicarb.  He improved and wants to go home today.  I discussed this complex case today with Dr. Rubio, Dr. Dong, and Dr. Payne on the day of discharge.      Discharge Follow Up Recommendations for labs/diagnostics:    follow up with nephrology regarding renal function    Day of Discharge     HPI: asking to go home even before I have seen patient today.  Wife in room today asking to go home.  Therapy suggesting rehab, but patient and wife refusing this suggestion    Review of Systems    Gen- No fevers, chills  CV- No chest pain, palpitations  Resp- No cough, dyspnea  GI- No N/V/D, abd pain    Otherwise ROS is negative except as mentioned in the HPI.    Vital Signs:   Temp:  [97.4 °F (36.3 °C)-98.4 °F (36.9 °C)] 98.4 °F (36.9 °C)  Heart Rate:  [67-77] 72  Resp:  [16-18] 16  BP: (146-190)/() 153/72     Physical Exam:    Constitutional: No acute distress, awake, alert  HENT: NCAT, no JVD  Respiratory: Clear to auscultation bilaterally, respiratory effort normal   Cardiovascular: RRR, s1 and s2  Gastrointestinal: Positive bowel sounds, soft, nontender, nondistended  Musculoskeletal: no LE edema right LE; right BKA  Psychiatric: Appropriate affect, cooperative      Pertinent  and/or Most Recent Results     Results from last 7 days   Lab Units 08/14/19  0642 08/13/19  0410 08/12/19  0627 08/11/19  0603 08/10/19  0703 08/09/19  1030 08/09/19  0502 08/08/19  0738   WBC 10*3/mm3  --   --  13.70* 11.25* 12.73* 14.13*  --  13.61*   HEMOGLOBIN g/dL  --   --  11.2* 10.6* 10.6* 10.6*  --  10.9*   HEMATOCRIT %  --   --  34.9* 33.5* 33.2* 34.1*  --   35.1*   PLATELETS 10*3/mm3  --   --  255 248 242 258  --  246   SODIUM mmol/L 139 141  139 142 141 137  --  138 138   POTASSIUM mmol/L 3.5 3.9  3.9 4.5 4.5 5.1  --  5.0 5.2   CHLORIDE mmol/L 96* 100  99 101 105 106  --  104 104   CO2 mmol/L 26.0 21.0*  25.0 25.0 20.0* 17.0*  --  17.0* 17.0*   BUN mg/dL 44* 50*  49* 59* 66* 66*  --  63* 61*   CREATININE mg/dL 2.58* 3.00*  3.24* 3.69* 4.48* 4.92*  --  5.29* 4.94*   GLUCOSE mg/dL 234* 193*  195* 170* 103* 155*  --  115* 234*   CALCIUM mg/dL 8.4* 8.4*  8.1* 8.3* 8.0* 7.8*  --  7.7* 8.1*     Results from last 7 days   Lab Units 08/13/19  0410   BILIRUBIN mg/dL <0.2*   ALK PHOS U/L 196*   ALT (SGPT) U/L 12   AST (SGOT) U/L 16           Invalid input(s): TG, LDLCALC, LDLREALC    Brief Urine Lab Results  (Last result in the past 365 days)      Color   Clarity   Blood   Leuk Est   Nitrite   Protein   CREAT   Urine HCG        08/04/19 1310             34.7           Microbiology Results Abnormal     Procedure Component Value - Date/Time    Stool Culture - Stool, Per Rectum [[id number]] Collected:  08/10/19 0324    Lab Status:  Final result Specimen:  Stool from Per Rectum Updated:  08/14/19 0911     Salmonella/Shigella Screen Final report     Result 1 Comment     Comment: No Salmonella or Shigella recovered.        Campylobacter Culture Final report     Result 1 Comment     Comment: No Campylobacter species isolated.        E coli, Shiga toxin Assay Negative    Narrative:       Performed at:  [address] [place] [address]  [id number]  : Dl Laird PhD, Phone:  [phone]    Fecal Leukocytes - Stool, Per Rectum [[id number]]  (Normal) Collected:  08/10/19 0324    Lab Status:  Final result Specimen:  Stool from Per Rectum Updated:  08/10/19 0905     Fecal Leukocytes No WBC's Seen    Anaerobic Culture - Wound, Rectal Abscess [[id number]] Collected:  08/02/19 1620    Lab Status:  Final result Specimen:  Wound from Rectal Abscess Updated:   08/07/19 0914     Anaerobic Culture No anaerobes isolated at 5 days    Wound Culture - Wound, Rectal Abscess [[id number]]  (Abnormal)  (Susceptibility) Collected:  08/02/19 1620    Lab Status:  Final result Specimen:  Wound from Rectal Abscess Updated:  08/06/19 0922     Wound Culture Scant growth (1+) Escherichia coli      Scant growth (1+) Klebsiella pneumoniae ssp pneumoniae      Scant growth (1+) Normal Fecal Haily     Gram Stain Few (2+) WBCs seen      Occasional Yeast      Rare (1+) Gram negative bacilli    Narrative:       Specimen is an abscess - work up all GNRs per Dr. Dong ([facility] infection control).    Susceptibility      Escherichia coli     EYAD     Ampicillin Resistant     Ampicillin + Sulbactam Intermediate     Cefazolin Susceptible     Cefepime Susceptible     Ceftazidime Susceptible     Ceftriaxone Susceptible     Gentamicin Susceptible     Levofloxacin Resistant     Piperacillin + Tazobactam Susceptible     Tetracycline Susceptible     Trimethoprim + Sulfamethoxazole Susceptible                Susceptibility      Klebsiella pneumoniae ssp pneumoniae     EYAD     Ampicillin Resistant     Ampicillin + Sulbactam Susceptible     Cefazolin Susceptible     Cefepime Susceptible     Ceftazidime Susceptible     Ceftriaxone Susceptible     Gentamicin Susceptible     Levofloxacin Susceptible     Piperacillin + Tazobactam Susceptible     Tetracycline Susceptible     Trimethoprim + Sulfamethoxazole Susceptible                    Blood Culture - Blood, Hand, Left [[id number]] Collected:  07/30/19 1845    Lab Status:  Final result Specimen:  Blood from Hand, Left Updated:  08/04/19 2145     Blood Culture No growth at 5 days    Blood Culture - Blood, Arm, Right [[id number]] Collected:  07/30/19 1840    Lab Status:  Final result Specimen:  Blood from Arm, Right Updated:  08/04/19 2145     Blood Culture No growth at 5 days    Urine Culture - Urine, Urine, Clean Catch [[id number]]  (Normal) Collected:  07/30/19 1725     Lab Status:  Final result Specimen:  Urine, Clean Catch Updated:  07/31/19 2019     Urine Culture No growth        Imaging Results (all)     Procedure Component Value Units Date/Time    US Renal Limited [[id number]] Collected:  08/05/19 1722     Updated:  08/07/19 1702    Narrative:       EXAMINATION: US RENAL LIMITED-     INDICATION: Acute kidney injury.      TECHNIQUE: Ultrasound kidneys and urinary bladder.     COMPARISON: None.     FINDINGS: Right kidney measures 12.3 cm in length without evidence of  hydronephrosis, contour deforming mass or obvious calculi.     Left kidney measures 13.3 cm in length without evidence of  hydronephrosis, contour deforming mass or obvious calculi.     Urinary bladder is decompressed with Walker catheter in situ.       Impression:       No acute sonographic abnormality within the visualized  kidneys. No hydronephrosis. Walker catheter in situ with no gross  abnormality of the decompressed urinary bladder.      D:  08/05/2019  E:  08/06/2019     This report was finalized on 8/7/2019 4:59 PM by Dr. Jb Campos.       CT Abdomen Pelvis With Contrast [[id number]] Collected:  07/30/19 1749     Updated:  07/31/19 1034    Narrative:       EXAMINATION: CT ABDOMEN PELVIS W CONTRAST- 07/30/2019      INDICATION: lower abd pain     TECHNIQUE: CT scan of the abdomen and pelvis was performed with  intravenous contrast.     The radiation dose reduction device was turned on for each scan per the  ALARA (As Low as Reasonably Achievable) protocol.     COMPARISON: 05/30/2019     FINDINGS: The most superior images demonstrate no basilar inflammatory  inflammatory process or pleural effusion. Small effusions have resolved  since previous examination. The liver and spleen are normal. There are  small bilateral fat-containing adrenal nodules consistent bilateral  myolipomas, unchanged. The pancreas is normal. There is no renal mass,  stone or obstruction. There is no ascites,  aneurysm or  retroperitoneal  lymphadenopathy. There is diffuse thickening of the bladder wall,  probably related to the peroneal abscess. There is no inguinal  lymphadenopathy. There are sigmoid diverticula without features of  diverticulitis. The appendix is normal.     Just inferior to the prostate gland and anterior to the distal rectum  there is a 3.0 x 3.2 cm fluid collection with enhancing margins  consistent with an abscess.       Impression:       There is a 3.0 x 3.2 cm fluid collection with enhancing  margins consistent with an abscess related to the inferior aspect of the  prostate gland and anterior to the distal rectum.     D:  07/30/2019  E:  07/31/2019     This report was finalized on 7/31/2019 10:31 AM by Dr. Guillermo Akbar MD.           Results for orders placed during the hospital encounter of 01/25/17   Doppler Arterial Multi Level Lower Extremity - Bilateral CAR    Narrative · Right Conclusion: The right CARLI is normal.  · Left Conclusion: The left CARLI could not be calculated due to   noncompressible vessels, most probably due to calcifications. Waveforms   are suggestive of disease involving the tibioperoneal arteries.        Results for orders placed during the hospital encounter of 01/25/17   Doppler Arterial Multi Level Lower Extremity - Bilateral CAR    Narrative · Right Conclusion: The right CARLI is normal.  · Left Conclusion: The left CARLI could not be calculated due to   noncompressible vessels, most probably due to calcifications. Waveforms   are suggestive of disease involving the tibioperoneal arteries.        Results for orders placed during the hospital encounter of 05/18/19   Adult Transthoracic Echo Complete W/ Cont if Necessary Per Protocol    Narrative · Estimated EF = 60%.  · Mild mitral valve regurgitation is present  · Mild tricuspid valve regurgitation is present.  · Calculated right ventricular systolic pressure from tricuspid   regurgitation is 29 mmHg.          Discharge Details         Discharge Medications      New Medications      Instructions Start Date   amLODIPine 10 MG tablet  Commonly known as:  NORVASC   10 mg, Oral, Every 24 Hours Scheduled      cefuroxime 500 MG tablet  Commonly known as:  CEFTIN   500 mg, Oral, 2 Times Daily      erythromycin base 250 MG tablet  Commonly known as:  E-MYCIN   250 mg, Oral, 3 Times Daily With Meals      hydrALAZINE 100 MG tablet  Commonly known as:  APRESOLINE   100 mg, Oral, Every 8 Hours Scheduled      metroNIDAZOLE 500 MG tablet  Commonly known as:  FLAGYL   500 mg, Oral, 2 Times Daily      saccharomyces boulardii 250 MG capsule  Commonly known as:  FLORASTOR   250 mg, Oral, 2 Times Daily      sevelamer 0.8 g pack packet  Commonly known as:  RENVELA   800 mg, Oral, 3 Times Daily With Meals      terazosin 5 MG capsule  Commonly known as:  HYTRIN   5 mg, Oral, Nightly         Changes to Medications      Instructions Start Date   Metoprolol Tartrate 75 MG tablet  What changed:  how much to take   100 mg, Oral, Every 12 Hours Scheduled         Continue These Medications      Instructions Start Date   DULoxetine 30 MG capsule  Commonly known as:  CYMBALTA   30 mg, Oral, Daily      HYDROcodone-acetaminophen  MG per tablet  Commonly known as:  NORCO   1 tablet, Oral, 3 Times Daily PRN      insulin lispro 100 UNIT/ML injection  Commonly known as:  humaLOG   5 Units, Subcutaneous, 4 Times Daily With Meals & Nightly, Plus sliding scale      LANTUS 100 UNIT/ML injection  Generic drug:  insulin glargine   30 Units, Subcutaneous, 2 Times Daily      tamsulosin 0.4 MG capsule 24 hr capsule  Commonly known as:  FLOMAX   0.8 mg, Oral, Nightly         Stop These Medications    furosemide 40 MG tablet  Commonly known as:  LASIX     gabapentin 400 MG capsule  Commonly known as:  NEURONTIN     Lactobacillus tablet     lisinopril 40 MG tablet  Commonly known as:  PRINIVIL,ZESTRIL          No Known Allergies    Discharge Disposition:  Home or Self Care    Discharge  Diet:  Diet Order   Procedures   • Diet Soft Texture; Whole Foods; Consistent Carbohydrate; Low Phosphorus     Discharge Activity:   as tolerated    CODE STATUS:    Code Status and Medical Interventions:   Ordered at: 07/30/19 2051     Code Status:    CPR     Medical Interventions (Level of Support Prior to Arrest):    Full     Future Appointments   Date Time Provider Department Center   8/20/2019  3:15 PM Garcia Lenz MD [facility] None   8/26/2019 11:15 AM Keyanna Leone APRN [facility] None   10/1/2019 11:15 AM Keyanna Leone APRN [facility] None     Additional Instructions for the Follow-ups that You Need to Schedule     Discharge Follow-up with PCP   As directed       Currently Documented PCP:    Keyanna Leone APRN    PCP Phone Number:    [phone]     Follow Up Details:  Primary Care - CHINTAN Ku - 1 week - outpatient referral for sleep study; blood pressure check         Discharge Follow-up with Specified Provider: Dr. Dong; 2 Weeks   As directed      To:  Dr. Dong    Follow Up:  2 Weeks    Follow Up Details:  Perirectal abscess follow up         Discharge Follow-up with Specified Provider: Dr. Payne; 2 Weeks   As directed      To:  Dr. Payne    Follow Up:  2 Weeks    Follow Up Details:  Follow up perirectal abscess         Discharge Follow-up with Specified Provider: [facility]; 1 Week   As directed      To:  [facility]    Follow Up:  1 Week    Follow Up Details:  Acute kidney injury             Follow up with Dr. Payne in 2 weeks  Follow up in [facility]    Encouraged patient to have an outpatient sleep study.    Time Spent on Discharge:  45 minutes    Electronically signed by Cody Naqvi MD, 08/14/19, 2:10 PM.

## 2019-08-14 NOTE — DISCHARGE PLACEMENT REQUEST
"Elliot Crystal (48 y.o. Male)   Neil Valentine RN Case Management  [phone]    Date of Birth Social Security Number Address Home Phone MRN    1970  [facility] [address] [place] [address] [phone] [id number]    Restoration[employer] Marital Status          Latter day        Admission Date Admission Type Admitting Provider Attending Provider Department, Room/Bed    7/30/19 Emergency Cody Naqvi MD Schnell, Aaron, MD [facility] 3F, S317/1    Discharge Date Discharge Disposition Discharge Destination         Home or Self Care              Attending Provider:  Cody Naqvi MD    Allergies:  No Known Allergies    Isolation:  None   Infection:  MRSA (03/03/17)   Code Status:  CPR    Ht:  190.5 cm (75\")   Wt:  113 kg (250 lb)    Admission Cmt:  None   Principal Problem:  Sepsis (CMS/[facility]) [A41.9]                 Active Insurance as of 7/30/2019     Primary Coverage     Payor Plan Insurance Group Employer/Plan Group    MEDICARE MEDICARE A & B      Payor Plan Address Payor Plan Phone Number Payor Plan Fax Number Effective Dates    [address] [phone]  6/1/2017 - None Entered    [facility] [id number]       Subscriber Name Subscriber Birth Date Member ID       ELLIOT CRYSTAL 1970 [id number]           Secondary Coverage     Payor Plan Insurance Group Employer/Plan Group    [place]      Payor Plan Address Payor Plan Phone Number Payor Plan Fax Number Effective Dates    [address] [phone]  10/3/2017 - None Entered    [place] [address]       Subscriber Name Subscriber Birth Date Member ID       ELLIOT CRYSTAL 1970 [id number]                 Emergency Contacts      (Rel.) Home Phone Work Phone Mobile Phone    Cindy Crystal (Spouse) [phone] -- [phone]        [address]  [address] [facility] [address]  Phone:  [phone]  Fax:   Date: Aug 14, 2019      Ambulatory Referral to Home " Health     Patient:  Kendrick Crystal MRN:  [id number]   [address] [place] [address] :  1970  SSN:    Phone: [phone] Sex:  M      INSURANCE PAYOR PLAN GROUP # SUBSCRIBER ID   Primary:  Secondary:    [place] [id number]  [id number]      [id number]  [id number]      Referring Provider Information:  ALLYSON BRITTON Phone: [phone] Fax:       Referral Information:   # Visits:  1 Referral Type: Home Health [42]   Urgency:  Routine Referral Reason: Specialty Services Required   Start Date: Aug 14, 2019 End Date:  To be determined by Insurer   Diagnosis: Sepsis, due to unspecified organism (CMS/HCC) (A41.9 [ICD-10-CM] 038.9,995.91 [ICD-9-CM])  Essential hypertension (I10 [ICD-10-CM] 401.9 [ICD-9-CM])  Impaired mobility and ADLs (Z74.09 [ICD-10-CM] 799.89 [ICD-9-CM])  Type 2 diabetes mellitus with diabetic peripheral angiopathy and gangrene, with long-term current use of insulin (CMS/HCC) (E11.52,Z79.4 [ICD-10-CM] 250.70,443.81,785.4,V58.67 [ICD-9-CM])  JUAN (acute kidney injury) (CMS/HCC) (N17.9 [ICD-10-CM] 584.9 [ICD-9-CM])  DM (diabetes mellitus) type II uncontrolled, periph vascular disorder (CMS/HCC) (E11.51,E11.65 [ICD-10-CM] 250.72,443.81 [ICD-9-CM])      Refer to Dept:   Refer to Provider:   Refer to Facility:       Face to Face Visit Date: 2019  Follow-up Provider for Plan of Care? I treated the patient in an acute care facility and will not continue treatment after discharge.  Follow-up Provider: ALLYSON BRITTON [[id number]]  Reason/Clinical Findings: Diabetes Management and HTN  Describe mobility limitations that make leaving home difficult: Impaired mobility and balance  Nursing/Therapeutic Services Requested: Skilled Nursing  Nursing/Therapeutic Services Requested: Physical Therapy  Nursing/Therapeutic Services Requested: Occupational Therapy  Skilled nursing orders: Medication education  PT orders: Therapeutic exercise  PT orders: Gait Training  PT orders:  Strengthening  PT orders: Home safety assessment  Weight Bearing Status: Full Weight Bearing  Occupational orders: Energy conservation  Occupational orders: Strengthening  Occupational orders: Home safety assessment  Occupational orders: Activities of daily living  Frequency: 1 Week 1     This document serves as a request of services and does not constitute Insurance authorization or approval of services.  To determine eligibility, please contact the members Insurance carrier to verify and review coverage.     If you have medical questions regarding this request for services. Please contact [address] at [phone] between the hours of 8:00am - 5:00pm (Mon-Fri).       Verbal Order Mode: Verbal with readback   Authorizing Provider: Cody Naqvi MD  Authorizing Provider's NPI: [id number]     Order Entered By: Fadi Valentine RN 2019  2:22 PM     Electronically signed by:                History & Physical      Sima Zarate MD at 2019  8:40 PM              [facility] Medicine Services  HISTORY AND PHYSICAL    Patient Name: Kendrick Crystal  : 1970  MRN: [id number]  Primary Care Physician: Keyanna Leone APRN  Date of admission: 2019      Subjective   Subjective     Chief Complaint:  Rectal pain     HPI:  Kendrick Crystal is a 48 y.o. male with a hx of uncontrolled T2DM, HTN, MRSA infection, PVD s/p left BKA and HLD who presented to the ED w/ c/o rectal pain. Pt reports that he developed rectal pain on Saturday. The pain has progressively gotten worse prompting his ED visit today. At worst pain 8/10. Pt reports that he has been unable to have a bowel movement since Friday because he feels like there is a blockage in his rectal area. He initially thought that he had hemorrhoids. He developed chills on  but no known fever. He also c/o nausea and dry- heaving but no vomiting. Pt denies chest pain, dyspnea, cough, abdominal pain, dysuria,  "difficulty urinating, edema, syncope, confusion. Pt evaluated in the ED. Ct abd/pel revealed rectal/prostate abscess. Pt admitted to the hospital medicine service for further evaluation.  Pt w/ history of medication non-compliance. He reports that his glucose is always high. He does not check his glucose daily and often \"forgets\" to take his daily medications/injections.   Pt w/ admit in April/May 2019 for abdominal wall cellulitis w/ wound requiring surgical intervention. Pt also seen by ID while inpatient.       Review of Systems   Constitutional: Positive for chills. Negative for activity change, appetite change, diaphoresis, fatigue, fever and unexpected weight change.        Chills x 2 days.    HENT: Negative.    Eyes: Negative.    Respiratory: Negative.    Cardiovascular: Negative.    Gastrointestinal: Positive for nausea and rectal pain. Negative for abdominal distention, abdominal pain, anal bleeding, blood in stool, constipation, diarrhea and vomiting.        Rectal pain; onset Saturday w/ progressively worsening pain and discomfort. Pt describes sensation of \"rectal blockage\"; no BM since Friday.    Endocrine: Negative.    Genitourinary: Negative.    Musculoskeletal: Negative.    Skin: Negative.    Allergic/Immunologic: Negative.    Neurological: Negative.    Hematological: Negative.    Psychiatric/Behavioral: Negative.    All other systems reviewed and are negative.       Otherwise complete ROS reviewed and is negative except as mentioned in the HPI.    Personal History     Past Medical History:   Diagnosis Date   • Abdominal wall cellulitis 5/20/2019   • JUAN (acute kidney injury) (CMS/HCC) 7/29/2018   • Arthritis    • Bipolar 1 disorder (CMS/HCC)    • Cellulitis of right anterior lower leg 7/29/2018   • Cirrhosis (CMS/HCC)    • Counseling for insulin pump    • Depression    • Diabetes mellitus (CMS/HCC)    • Encephalopathy, hepatic (CMS/HCC)    • H/O degenerative disc disease    • History of Lissa's " gangrene    • Hyperlipemia    • Hypertension    • multiple Skin abscesses    • DUNLAP, bx showed stage IV fibrosis    • Neuropathy    • Peripheral vascular disease (CMS/HCC)    • Thrombophlebitis        Past Surgical History:   Procedure Laterality Date   • ABDOMINAL WALL ABSCESS INCISION AND DRAINAGE N/A 4/26/2019    Procedure: ABDOMINAL WALL DEBRIDEMENT;  Surgeon: Fadi Kern MD;  Location:  [facility];  Service: General   • AMPUTATION DIGIT Left 1/30/2017    Procedure: left fourth and fifth transmetatarsal toe amputation ;  Surgeon: Juancho Martinez MD;  Location:  [facility];  Service:    • BELOW KNEE AMPUTATION Left 3/2/2017    Procedure: AMPUTATION BELOW KNEE, SHMUEL;  Surgeon: Juancho Martinez MD;  Location:  [facility];  Service:    • ENDOSCOPY     • LEG SURGERY     • TESTICLE SURGERY      DEBRIDEMENT FROM GANGRENE   • TONSILLECTOMY  1975       Family History: family history includes Arthritis in his father and mother; Diabetes in his brother and father; Heart attack in his father; Hyperlipidemia in his father; Hypertension in his brother, father, and mother; Kidney disease in his mother; Mental illness in his sister; Obesity in his brother; Stroke in his mother. Otherwise pertinent FHx was reviewed and unremarkable.     Social History:  reports that he has never smoked. He has never used smokeless tobacco. He reports that he does not drink alcohol or use drugs.  Social History     Social History Narrative    Lives in [facility]       Medications:    Available home medication information reviewed.  Medications Prior to Admission   Medication Sig Dispense Refill Last Dose   • DULoxetine (CYMBALTA) 30 MG capsule Take 1 capsule by mouth Daily. 90 capsule 1 7/29/2019 at Unknown time   • gabapentin (NEURONTIN) 400 MG capsule Take 3 capsules by mouth 3 (Three) Times a Day.   7/29/2019 at Unknown time   • HYDROcodone-acetaminophen (NORCO)  MG per tablet Take 1 tablet by mouth 3 (Three) Times a Day As Needed  for Moderate Pain  or Severe Pain .   7/29/2019 at Unknown time   • insulin lispro (humaLOG) 100 UNIT/ML injection Inject 5 Units under the skin into the appropriate area as directed 4 (Four) Times a Day With Meals & at Bedtime. Plus sliding scale 60 mL 5 7/29/2019 at Unknown time   • LANTUS 100 UNIT/ML injection Inject 30 Units under the skin into the appropriate area as directed 2 (Two) Times a Day. 60 mL 1 7/29/2019 at Unknown time   • lisinopril (PRINIVIL,ZESTRIL) 40 MG tablet Take 40 mg by mouth Daily.   7/29/2019 at Unknown time   • Metoprolol Tartrate 75 MG tablet Take 75 mg by mouth Every 12 (Twelve) Hours. 180 tablet 1 7/29/2019 at Unknown time   • tamsulosin (FLOMAX) 0.4 MG capsule 24 hr capsule Take 2 capsules by mouth Every Night. 180 capsule 1 7/29/2019 at Unknown time   • Lactobacillus tablet Take 1 tablet by mouth 2 (Two) Times a Day. (Patient taking differently: Take 1 tablet by mouth 2 (Two) Times a Day. Pt states that he is no longer taking) 60 tablet 11 Taking       Allergies   Allergen Reactions   • No Known Drug Allergy        Objective   Objective     Vital Signs:   Temp:  [98 °F (36.7 °C)] 98 °F (36.7 °C)  Heart Rate:  [] 89  Resp:  [16-18] 18  BP: (115-157)/(74-99) 157/98        Physical Exam   Constitutional: He is oriented to person, place, and time. He appears well-developed and well-nourished. No distress.   HENT:   Head: Normocephalic and atraumatic.   Eyes: EOM are normal. Pupils are equal, round, and reactive to light.   Neck: Normal range of motion. Neck supple.   Cardiovascular: Normal rate, regular rhythm, normal heart sounds and intact distal pulses. Exam reveals no gallop and no friction rub.   No murmur heard.  Pulmonary/Chest: Effort normal and breath sounds normal. No stridor. No respiratory distress. He has no wheezes. He has no rales. He exhibits no tenderness.   Abdominal: Soft. Bowel sounds are normal. He exhibits no distension. There is no tenderness. There is no  guarding.   Musculoskeletal: Normal range of motion. He exhibits no edema, tenderness or deformity.   S/p left BKA.    Neurological: He is alert and oriented to person, place, and time. No cranial nerve deficit.   Skin: Skin is warm and dry. Capillary refill takes 2 to 3 seconds. No rash noted. He is not diaphoretic. No erythema. No pallor.   Healing wound under abdominal fold- left suprapubic area. Wound closed; non-draining. No surrounding erythema or edema.    Psychiatric: He has a normal mood and affect. His behavior is normal. Judgment and thought content normal.   Nursing note and vitals reviewed.       Results Reviewed:  I have personally reviewed current lab, radiology, and data and agree.    Results from last 7 days   Lab Units 07/30/19  1631   WBC 10*3/mm3 12.13*   HEMOGLOBIN g/dL 13.7   HEMATOCRIT % 42.9   PLATELETS 10*3/mm3 290     Results from last 7 days   Lab Units 07/30/19  1631   SODIUM mmol/L 126*   POTASSIUM mmol/L 5.0   CHLORIDE mmol/L 88*   CO2 mmol/L 26.0   BUN mg/dL 19   CREATININE mg/dL 0.79   GLUCOSE mg/dL 687*   CALCIUM mg/dL 9.3   ALT (SGPT) U/L 19   AST (SGOT) U/L 17   LACTATE mmol/L 3.6*   PROCALCITONIN ng/mL 0.11     Estimated Creatinine Clearance: 151.7 mL/min (by C-G formula based on SCr of 0.79 mg/dL).  Brief Urine Lab Results  (Last result in the past 365 days)      Color   Clarity   Blood   Leuk Est   Nitrite   Protein   CREAT   Urine HCG        07/30/19 1725 Yellow Turbid Moderate (2+) Moderate (2+) Negative 30 mg/dL (1+)             Imaging Results (last 24 hours)     Procedure Component Value Units Date/Time    CT Abdomen Pelvis With Contrast [[id number]] Collected:  07/30/19 1749     Updated:  07/30/19 1756    Narrative:       EXAMINATION: CT ABDOMEN PELVIS W CONTRAST-      INDICATION: lower abd pain     TECHNIQUE: CT scan of the abdomen and pelvis was performed with  intravenous contrast.     The radiation dose reduction device was turned on for each scan per the  ALARA (As Low  "as Reasonably Achievable) protocol.     COMPARISON: 05/30/2019     FINDINGS: The most superior images demonstrate no basilar inflammatory  inflammatory process or pleural effusion. Small effusions have resolved  since previous examination. The liver and spleen are normal. There are  small bilateral fat-containing adrenal nodules consistent bilateral  myolipomas, unchanged. The pancreas is normal. There is no renal mass,  stone or obstruction. There is no ascites or aneurysm or retroperitoneal  lymphadenopathy. There is diffuse thickening of the bladder wall,  probably related to the peroneal abscess. There is no inguinal  lymphadenopathy. There are sigmoid diverticula without features of  diverticulitis. The appendix is normal.     Just inferior to the prostate gland and anterior to the distal rectum  there is a 3.0 x 3.2 cm fluid collection with enhancing margins  consistent with an abscess.             Impression:       There is a 3.0 x 3.2 cm fluid collection with enhancing  margins consistent with an abscess related to the inferior aspect of the  prostate gland and anterior to the distal rectum.           Results for orders placed during the hospital encounter of 05/18/19   Adult Transthoracic Echo Complete W/ Cont if Necessary Per Protocol    Narrative · Estimated EF = 60%.  · Mild mitral valve regurgitation is present  · Mild tricuspid valve regurgitation is present.  · Calculated right ventricular systolic pressure from tricuspid   regurgitation is 29 mmHg.          Assessment/Plan   Assessment / Plan     Active Hospital Problems    Diagnosis POA   • **Sepsis (CMS/HCC) [A41.9] Yes   • Abscess [L02.91] Yes     7/30/19 per CT abd/pel: \"3.0 x 3.2 cm fluid collection with enhancing  margins consistent with an abscess related to the inferior aspect of the  prostate gland and anterior to the distal rectum.\"      • UTI (urinary tract infection) [N39.0] Yes   • Hyponatremia [E87.1] Yes   • DM (diabetes mellitus) type " "II uncontrolled, periph vascular disorder (CMS/HCC) [E11.51, E11.65] Yes   • Peripheral vascular disease (CMS/HCC) [I73.9] Yes   • S/P BKA (below knee amputation), left (CMS/HCC) [Z89.512] Not Applicable   • History of MRSA infection [Z86.14] Yes   • Type 2 diabetes mellitus with circulatory disorder and uncontrolled [E11.59] Yes   • Essential hypertension [I10] Yes   • Hyperlipemia [E78.5] Yes   • DUNLAP Cirrhosis [K74.60] Yes   • Non-compliance [Z91.14] Not Applicable       Assessment & Plan    Mr. Crystal is a 48 year old gentleman with uncontrolled diabetes who presents with sepsis secondary to abscess inferior to the prostate and anterior to the rectum.    **Sepsis, abscess, UTI   -sepsis 2/2 abscess, UTI   -pt meets sepsis criteria based on elevated lactic acid, leukocytosis, tachycardia and positive source  -CT abd/pel obtained: \"3.0 x 3.2 cm fluid collection with enhancing  margins consistent with an abscess related to the inferior aspect of the  prostate gland and anterior to the distal rectum.\"  -blood culture collected  -UA reviewed; + for infection; urine culture ordered  -IV Vanc and Zosyn started in the ED; continue pending cultures  -probiotic   -s/p 2 liters NS bolus in ED  -continue IVF; NS @ 100ml/hour  -PRN pain meds and antiemetics  -Dr. Payne w/ colorectal surgery consulted; Dr. Payne wanted CT guided abscess drainage to be ordered for am- spoke w/ CT and orders in place w/ fluid culture   -NPO after midnight   -symptom mgt  -monitor labs  -consider ID consult depending on clinical progress; pt previously seen by Dr. Garcia when inpatient in May for abdominal wall cellulitis w/ abscess     **Hyponatremia  -likely 2/2 to hyperglycemia  -s/p 2 liters NS; continue NS @ 100ml/hour  -trend; repeat BMP in am  -treat glucose    **Uncontrolled T2DM  -pt reports that he often \"forgets\" to take his routine medications; including his insulin and does not check his glucose daily  -hold routine humalog and " "lantus (NPO after MN)  -FSBG q ac/hs w/ MDSS; adjust PRN  -hem a1c w/ am labs  -previous hem a1c 13.43 on 7/12/19  -diabetes educator consult     **HTN  -stable  -continue routine Lisinopril and Metoprolol w/ hold parameters      DVT prophylaxis:  scd- right leg     CODE STATUS:    Code Status and Medical Interventions:   Ordered at: 07/30/19 2051     Code Status:    CPR     Medical Interventions (Level of Support Prior to Arrest):    Full       Admission Status:  I believe this patient meets INPATIENT status due to the need for care which can only be reasonably provided in an hospital setting due to sepsis due to perirectal abscess inferior to the prostate requiring IV antibiotics and specialty consultation.  As such, I feel patient’s risk for adverse outcomes and need for care warrant INPATIENT evaluation and predict the patient’s care encounter to likely last beyond 2 midnights.      Electronically signed by CHINTAN Carlton, 07/30/19, 8:40 PM.      Brief Attending Admission Attestation     I have seen and examined the patient, performing an independent face-to-face diagnostic evaluation with plan of care reviewed and developed with the advanced practice clinician (APC).      Brief Summary Statement:   Kendrick Crystal is a 48 y.o. male with PMH significant for poorly controlled T2DM, PVD s/p left BKA, abdominal wall abscess treated by Dr. Kern 4/2019, HTN, and HLD.  He presented to [facility] ER today with a 3 day history of rectal pain, chills and nausea.  He thought initially he had a hemorrhoid and tried hemorrhoid medication.  He has been unable to pass stool due to feeling \"blocked.\"    WBC 12.13K, lactic 3.6 (follow up 1.8), Glc 687 (negative anion gap), and UA grossly positive for UTI.      CT abdomen and pelvis revealed 3 X 3.2cm abscess inferior to the prostate and anterior to the distal rectum.  Dr. Payne was consulted by the ER provider for assistance and hospital medicine was asked to admit the " patient.    Remainder of detailed HPI is as noted above and has been reviewed and/or edited by me for completeness.      Attending Physical Exam:  Constitutional: Awake, alert  Eyes: PERRLA, sclerae anicteric, no conjunctival injection  HENT: NCAT, mucous membranes moist  Neck: Supple, no thyromegaly, no lymphadenopathy, trachea midline  Respiratory: Clear to auscultation bilaterally, nonlabored respirations   Cardiovascular: RRR, palpable pedal pulses bilaterally  Gastrointestinal: Positive bowel sounds, soft, diffusely TTP  Musculoskeletal: Left BKA, well healed  Psychiatric: Appropriate affect, cooperative  Neurologic: Oriented x 3, strength symmetric in all extremities, Cranial Nerves grossly intact to confrontation, speech clear  Skin: No rashes          Brief Assessment/Plan :  See above for further detailed assessment and plan developed with APC which I have reviewed and/or edited for completeness.      Electronically signed by Sima Zarate MD, 07/30/19, 10:16 PM.             Electronically signed by Sima Zarate MD at 7/30/2019 10:29 PM       ICU Vital Signs     Row Name 08/14/19 1400 08/14/19 1215 08/14/19 1128 08/14/19 1000 08/14/19 0830       Vitals    Temp  --  --  98.4 °F (36.9 °C)  --  --    Temp src  --  --  Oral  --  --    Pulse  --  --  72  --  --    Heart Rate Source  --  --  Monitor  --  --    Resp  --  --  16  --  --    Resp Rate Source  --  --  Visual  --  --    BP  --  --  153/72  --  --    Noninvasive MAP (mmHg)  --  --  108  --  --    BP Location  --  --  Left arm  --  --    BP Method  --  --  Automatic  --  --    Patient Position  --  --  Lying  --  --       Oxygen Therapy    SpO2  --  --  94 %  --  --    Pulse Oximetry Type  --  --  Intermittent  --  --    Device (Oxygen Therapy)  room air  room air  room air  room air  room air    Row Name 08/14/19 0745 08/14/19 0512 08/14/19 0000 08/13/19 2304 08/13/19 2200       Vitals    Temp  97.6 °F (36.4 °C)  97.6 °F (36.4 °C)  --  98.4 °F  (36.9 °C)  --    Temp src  Oral  Oral  --  Oral  --    Pulse  75  73  --  77  --    Heart Rate Source  Monitor  Monitor  --  Monitor  --    Resp  16  16  --  16  --    Resp Rate Source  Visual  Visual  --  Visual  --    BP  186/100  (Abnormal)  nurse notified  166/82 nurse notified  --  167/87  --    Noninvasive MAP (mmHg)  139  --  --  --  --    BP Location  Left arm  Left arm  --  Left arm  --    BP Method  Automatic  Automatic  --  Automatic  --    Patient Position  Lying  Lying  --  Lying  --       Oxygen Therapy    SpO2  94 %  --  --  --  --    Pulse Oximetry Type  Intermittent  --  --  --  --    Device (Oxygen Therapy)  room air  --  room air  --  room air    Row Name 08/13/19 2045 08/13/19 2027 08/13/19 1655 08/13/19 1528          Vitals    Temp  --  98 °F (36.7 °C)  --  97.4 °F (36.3 °C)     [facility]  --  Oral  --  Oral     Pulse  --  74  70  67     Heart Rate Source  --  Monitor  --  Monitor     Resp  --  16  --  18     Resp Rate Source  --  Visual  --  Visual     BP  --  146/83  --  190/105  (Abnormal)      Noninvasive MAP (mmHg)  --  --  --  144     BP Location  --  Right arm  --  Right arm     BP Method  --  Automatic  --  --     Patient Position  --  Lying  --  Lying        Oxygen Therapy    Device (Oxygen Therapy)  room air  --  --  --         Hospital Medications (active)       Dose Frequency Start End    amLODIPine (NORVASC) tablet 10 mg 10 mg Every 24 Hours Scheduled 8/8/2019     Sig - Route: Take 1 tablet by mouth Daily. - Oral    carvedilol (COREG) tablet 25 mg 25 mg Every 12 Hours Scheduled 8/13/2019     Sig - Route: Take 2 tablets by mouth Every 12 (Twelve) Hours. - Oral    cefTRIAXone (ROCEPHIN) 1 g/100 mL 0.9% NS (MBP) 1 g Every 24 Hours 8/6/2019 8/20/2019    Sig - Route: Infuse 100 mL into a venous catheter Daily. - Intravenous    CloNIDine (CATAPRES) tablet 0.1 mg 0.1 mg Every 12 Hours PRN 8/13/2019     Sig - Route: Take 1 tablet by mouth Every 12 (Twelve) Hours As Needed for High Blood  Pressure (SBP >190). - Oral    dextrose (D50W) 25 g/ 50mL Intravenous Solution 25 g 25 g Every 15 Minutes PRN 7/30/2019     Sig - Route: Infuse 50 mL into a venous catheter Every 15 (Fifteen) Minutes As Needed for Low Blood Sugar (Blood Sugar Less Than 70). - Intravenous    dextrose (GLUTOSE) oral gel 15 g 15 g Every 15 Minutes PRN 7/30/2019     Sig - Route: Take 15 application by mouth Every 15 (Fifteen) Minutes As Needed for Low Blood Sugar (Blood sugar less than 70). - Oral    DULoxetine (CYMBALTA) DR capsule 30 mg 30 mg Daily 7/31/2019     Sig - Route: Take 1 capsule by mouth Daily. - Oral    erythromycin base (E-MYCIN) tablet 250 mg 250 mg 3 Times Daily With Meals 8/13/2019 8/16/2019    Sig - Route: Take 1 tablet by mouth 3 (Three) Times a Day With Meals. - Oral    glucagon (human recombinant) (GLUCAGEN DIAGNOSTIC) injection 1 mg 1 mg As Needed 7/30/2019     Sig - Route: Inject 1 mg under the skin into the appropriate area as directed As Needed for Low Blood Sugar (Blood Glucose Less Than 70). - Subcutaneous    heparin (porcine) 5000 UNIT/ML injection 5,000 Units 5,000 Units Every 8 Hours Scheduled 8/3/2019     Sig - Route: Inject 1 mL under the skin into the appropriate area as directed Every 8 (Eight) Hours. - Subcutaneous    hydrALAZINE (APRESOLINE) tablet 100 mg 100 mg Every 8 Hours Scheduled 8/14/2019     Sig - Route: Take 2 tablets by mouth Every 8 (Eight) Hours. - Oral    HYDROcodone-acetaminophen (NORCO)  MG per tablet 1 tablet 1 tablet Every 6 Hours PRN 7/30/2019 8/18/2019    Sig - Route: Take 1 tablet by mouth Every 6 (Six) Hours As Needed for Moderate Pain . - Oral    insulin detemir (LEVEMIR) injection 30 Units 30 Units Every 12 Hours Scheduled 8/8/2019     Sig - Route: Inject 30 Units under the skin into the appropriate area as directed Every 12 (Twelve) Hours. - Subcutaneous    insulin lispro (humaLOG) injection 0-9 Units 0-9 Units 4 Times Daily With Meals & Nightly 7/30/2019     Sig -  "Route: Inject 0-9 Units under the skin into the appropriate area as directed 4 (Four) Times a Day With Meals & at Bedtime. - Subcutaneous    insulin lispro (humaLOG) injection 5 Units 5 Units 3 Times Daily With Meals 8/8/2019     Sig - Route: Inject 5 Units under the skin into the appropriate area as directed 3 (Three) Times a Day With Meals. - Subcutaneous    metroNIDAZOLE (FLAGYL) 500 mg/100mL IVPB 500 mg Every 8 Hours 8/6/2019 8/20/2019    Sig - Route: Infuse 100 mL into a venous catheter Every 8 (Eight) Hours. - Intravenous    micafungin 100 mg/100 mL 0.9% NS IVPB (mbp) 100 mg Every 24 Hours 8/9/2019 8/19/2019    Sig - Route: Infuse 100 mL into a venous catheter Daily. - Intravenous    ondansetron (ZOFRAN) injection 4 mg 4 mg Every 6 Hours PRN 7/30/2019     Sig - Route: Infuse 2 mL into a venous catheter Every 6 (Six) Hours As Needed for Nausea or Vomiting. - Intravenous    Linked Group 1:  \"Or\" Linked Group Details        ondansetron (ZOFRAN) tablet 4 mg 4 mg Every 6 Hours PRN 7/30/2019     Sig - Route: Take 1 tablet by mouth Every 6 (Six) Hours As Needed for Nausea or Vomiting. - Oral    Linked Group 1:  \"Or\" Linked Group Details        promethazine (PHENERGAN) injection 12.5 mg 12.5 mg Every 6 Hours PRN 8/13/2019     Sig - Route: Infuse 0.5 mL into a venous catheter Every 6 (Six) Hours As Needed for Nausea or Vomiting. - Intravenous    Linked Group 2:  \"Or\" Linked Group Details        promethazine (PHENERGAN) tablet 12.5 mg 12.5 mg Every 6 Hours PRN 8/13/2019     Sig - Route: Take 0.5 tablets by mouth Every 6 (Six) Hours As Needed for Nausea or Vomiting. - Oral    Linked Group 2:  \"Or\" Linked Group Details        saccharomyces boulardii (FLORASTOR) capsule 250 mg 250 mg 2 Times Daily 7/30/2019     Sig - Route: Take 1 capsule by mouth 2 (Two) Times a Day. - Oral    sevelamer (RENVELA) packet 0.8 g 800 mg 3 Times Daily With Meals 8/10/2019     Sig - Route: Take 1 packet by mouth 3 (Three) Times a Day With " Meals. - Oral    sodium bicarbonate tablet 1,300 mg 1,300 mg 2 Times Daily 8/7/2019     Sig - Route: Take 2 tablets by mouth 2 (Two) Times a Day. - Oral    sodium chloride 0.9 % flush 10 mL 10 mL As Needed 7/30/2019     Sig - Route: Infuse 10 mL into a venous catheter As Needed for Line Care. - Intravenous    Cosign for Ordering: Accepted by Kendrick Sotelo MD on 7/31/2019 10:48 AM    tamsulosin (FLOMAX) 24 hr capsule 0.8 mg 0.8 mg Nightly 7/30/2019     Sig - Route: Take 2 capsules by mouth Every Night. - Oral    terazosin (HYTRIN) capsule 5 mg 5 mg Nightly 8/8/2019     Sig - Route: Take 1 capsule by mouth Every Night. - Oral    hydrALAZINE (APRESOLINE) tablet 25 mg (Discontinued) 25 mg Every 8 Hours Scheduled 8/13/2019 8/13/2019    Sig - Route: Take 1 tablet by mouth Every 8 (Eight) Hours. - Oral    hydrALAZINE (APRESOLINE) tablet 50 mg (Discontinued) 50 mg Every 8 Hours Scheduled 8/13/2019 8/14/2019    Sig - Route: Take 1 tablet by mouth Every 8 (Eight) Hours. - Oral    metoprolol tartrate (LOPRESSOR) tablet 100 mg (Discontinued) 100 mg Every 12 Hours Scheduled 8/12/2019 8/13/2019    Sig - Route: Take 1 tablet by mouth Every 12 (Twelve) Hours. - Oral             Physical Therapy Notes (most recent note)      Christi Lombardo PT at 8/13/2019  3:03 PM  Version 1 of 1         Problem: Patient Care Overview  Goal: Plan of Care Review  Outcome: Ongoing (interventions implemented as appropriate)   08/13/19 1502   Coping/Psychosocial   Plan of Care Reviewed With patient   OTHER   Outcome Summary PT eval completed. Pt. w/ limited participation, presents w/ impaired strength and functional decline. Skilled IPPT indicated to promote return to OF. Recommend IP rehab prior to return home, for best outcome.           Electronically signed by Christi Lombardo PT at 8/13/2019  3:03 PM          Occupational Therapy Notes (most recent note)      Areli Nichols, OT at 8/13/2019 10:01 AM          Acute Care -  Occupational Therapy Initial Evaluation  [facility]     Patient Name: Kendrick Crystal  : 1970  MRN: [id number]  Today's Date: 2019  Onset of Illness/Injury or Date of Surgery: 19  Date of Referral to OT: 19  Referring Physician: Jose    Admit Date: 2019       ICD-10-CM ICD-9-CM   1. Intra-abdominal abscess (CMS/[facility]) K65.1 567.22   2. Sepsis, due to unspecified organism (CMS/[facility]) A41.9 038.9     995.91   3. Hyperglycemia R73.9 790.29   4. Acute UTI N39.0 599.0   5. Abscess of rectum K61.1 566   6. Essential hypertension I10 401.9   7. Impaired mobility and ADLs Z74.09 799.89     Patient Active Problem List   Diagnosis   • DUNLAP Cirrhosis   • Essential hypertension   • Non-compliance   • Hyperlipemia   • Hypertriglyceridemia, essential   • Type 2 diabetes mellitus with circulatory disorder and uncontrolled   • History of MRSA infection   • JUAN (acute kidney injury) (CMS/[facility])   • S/P BKA (below knee amputation), left (CMS/[facility])   • Peripheral vascular disease (CMS/[facility])   • DM (diabetes mellitus) type II uncontrolled, periph vascular disorder (CMS/[facility])   • Obesity (BMI 30-39.9)     Past Medical History:   Diagnosis Date   • Abdominal wall cellulitis 2019   • JUAN (acute kidney injury) (CMS/[facility]) 2018   • Arthritis    • Bipolar 1 disorder (CMS/[facility])    • Cellulitis of right anterior lower leg 2018   • Cirrhosis (CMS/[facility])    • Counseling for insulin pump    • Depression    • Diabetes mellitus (CMS/[facility])    • Encephalopathy, hepatic (CMS/[facility])    • H/O degenerative disc disease    • History of Lissa's gangrene     right leg/testicle   • Hyperlipemia    • Hypertension    • multiple Skin abscesses    • DUNLAP, bx showed stage IV fibrosis    • Neuropathy    • Peripheral vascular disease (CMS/[facility])    • Thrombophlebitis      Past Surgical History:   Procedure Laterality Date   • ABDOMINAL WALL ABSCESS INCISION AND DRAINAGE N/A 2019    Procedure: ABDOMINAL WALL DEBRIDEMENT;  Surgeon:  Fadi Kern MD;  Location:  [facility];  Service: General   • AMPUTATION DIGIT Left 1/30/2017    Procedure: left fourth and fifth transmetatarsal toe amputation ;  Surgeon: Juancho Martinez MD;  Location:  [facility];  Service:    • BELOW KNEE AMPUTATION Left 3/2/2017    Procedure: AMPUTATION BELOW KNEE, SHMUEL;  Surgeon: Juancho Martinez MD;  Location:  [facility];  Service:    • ENDOSCOPY     • HEMORRHOIDECTOMY N/A 8/2/2019    Procedure: EXCISION AND DRAIN PERIRECTAL ABSCESS;  Surgeon: Mumtaz Payne MD;  Location:  [facility];  Service: General   • LEG SURGERY     • TESTICLE SURGERY Right     DEBRIDEMENT FROM GANGRENE   • TONSILLECTOMY  1975          OT ASSESSMENT FLOWSHEET (last 12 hours)      Occupational Therapy Evaluation     Row Name 08/13/19 0910                   OT Evaluation Time/Intention    Subjective Information  complains of;weakness;nausea/vomiting  -TB        Document Type  evaluation  -TB        Mode of Treatment  occupational therapy  -TB        Patient Effort  fair  -TB        Symptoms Noted During/After Treatment  none  -TB        Comment  Pt reluctant to participate in therapy. H/O L BKA - reports poorly fitting prosthesis  -TB           General Information    Patient Profile Reviewed?  yes  -TB        Onset of Illness/Injury or Date of Surgery  08/12/19  -TB        Referring Physician  Brown  -TB        Patient Observations  agree to therapy with encouragement  -TB        Patient/Family Observations  Family at bedside  -TB        General Observations of Patient  Specialty bed  -TB        Prior Level of Function  max assist:;ADL's  -TB        Equipment Currently Used at Home  bath bench;wheelchair, motorized  -TB        Pertinent History of Current Functional Problem  Pt to ED with c/o worsening colorectal pain. Admitted with rectal/prostate abscess. S/P surgical mgmt  -TB        Existing Precautions/Restrictions  fall  -TB        Limitations/Impairments  safety/cognitive;hearing  -TB         Risks Reviewed  patient and family:;LOB;increased discomfort;lines disloged;nausea/vomiting  -TB        Benefits Reviewed  patient and family:;improve function;increase strength;increase knowledge  -TB        Barriers to Rehab  medically complex;previous functional deficit;physical barrier poorly fitting L LE prosthesis  -TB           Relationship/Environment    Primary Source of Support/Comfort  spouse  -TB        Lives With  spouse  -TB        Family Caregiver if Needed  spouse  -TB        Concerns About Impact on Relationships  Pt has access to caregivers up to 40 hr/week  -TB           Resource/Environmental Concerns    Current Living Arrangements  home/apartment/condo  -TB           Cognitive Assessment/Interventions    Additional Documentation  Cognitive Assessment/Intervention (Group)  -TB           Cognitive Assessment/Intervention- PT/OT    Affect/Mental Status (Cognitive)  agitated  -TB        Orientation Status (Cognition)  oriented to;person;place;situation  -TB        Follows Commands (Cognition)  over 90% accuracy;verbal cues/prompting required  -TB        Cognitive Function (Cognitive)  safety deficit  -TB        Safety Deficit (Cognitive)  insight into deficits/self awareness reluctant to participate in IP therapy, wants  therapy  -TB        Cognitive Interventions (Cognitive)  occupation/activity based interventions  -TB        Personal Safety Interventions  fall prevention program maintained  -TB        Cognitive Assessment/Intervention Comment  Mild frustration with therapy assessment noted  -TB           Safety Issues, Functional Mobility    Safety Issues Affecting Function (Mobility)  insight into deficits/self awareness;awareness of need for assistance;judgment  -TB        Impairments Affecting Function (Mobility)  strength;range of motion (ROM);postural/trunk control;balance;endurance/activity tolerance;pain  -TB           Bed Mobility Assessment/Treatment    Comment (Bed Mobility)  Refused   -TB           Functional Mobility    Functional Mobility- Ind. Level  unable to perform  -TB        Functional Mobility- Comment  h/o L BKA with poorly fitting prosthesis  -TB           Transfer Assessment/Treatment    Comment (Transfers)  Refused - clinical judgement is dependent mechanical-lift transfer  -TB           ADL Assessment/Intervention    BADL Assessment/Intervention  lower body dressing;upper body dressing;grooming;feeding  -TB           Upper Body Dressing Assessment/Training    Comment (Upper Body Dressing)  Family reports set-up at baseline  -TB           Lower Body Dressing Assessment/Training    Lower Body Dressing [place] Level  maximum assist (25% patient effort)  -TB           Grooming Assessment/Training    [place] Level (Grooming)  set up  -TB           Self-Feeding Assessment/Training    [place] Level (Feeding)  set up  -TB           BADL Safety/Performance    Impairments, BADL Safety/Performance  balance;endurance/activity tolerance;pain;range of motion;strength;trunk/postural control  -TB           General ROM    GENERAL ROM COMMENTS  AROM grossly WFL for ADLs - strength deficits limit full ROM at shoulders  -TB           MMT (Manual Muscle Testing)    General MMT Comments  Significant generalized weakness, B UE functionally 3-/5  -TB           Motor Assessment/Interventions    Additional Documentation  Therapeutic Exercise (Group);Balance (Group)  -TB           Therapeutic Exercise    Therapeutic Exercise  supine, upper extremities  -TB        Additional Documentation  Therapeutic Exercise (Row)  -TB           Upper Extremity Supine Therapeutic Exercise    Performed, Supine Upper Extremity (Therapeutic Exercise)  shoulder flexion/extension;shoulder abduction/adduction;shoulder external/internal rotation;shoulder horizontal abduction/adduction;elbow flexion/extension  -TB        Exercise Type, Supine Upper Extremity (Therapeutic Exercise)  AROM (active range of motion)  -TB         Restrictions, Supine Upper Extremity (Therapeutic Exercise)  significant generalized weakness  -TB        Comment, Supine Upper Extremity (Therapeutic Exercise)  Reluctant to participate  -TB           Sensory Assessment/Intervention    Sensory General Assessment  light touch sensation deficits identified  -TB           Light Touch Sensation Assessment    Left Upper Extremity: Light Touch Sensation Assessment  moderate impairment, 50 to 74% correct responses  -TB        Comment, Left Upper Extremity: Light Touch Sensation Assessment  peripheral neuropathy fingertips to shoulder  -TB        Right Upper Extremity: Light Touch Sensation Assessment  moderate impairment, 50 to 74% correct responses  -TB        Comment, Right Upper Extremity: Light Touch Sensation Assessment  peripheral neuropathy fingertips to shoulder  -TB           Positioning and Restraints    Pre-Treatment Position  in bed  -TB        Post Treatment Position  bed  -TB        In Bed  fowlers;call light within reach;encouraged to call for assist;with family/caregiver  -TB           Pain Assessment    Additional Documentation  Pain Scale: FACES Pre/Post-Treatment (Group)  -TB           Pain Scale: FACES Pre/Post-Treatment    Pain: FACES Scale, Pretreatment  0-->no hurt  -TB        Pain: FACES Scale, Post-Treatment  0-->no hurt  -TB        Pre/Post Treatment Pain Comment  Pt declined assist to reposition  -TB           Wound 08/02/19 1800 perineum incision    Wound - Properties Group Date first assessed: 08/02/19  -LN Time first assessed: 1800  -LN Location: perineum  -LN Type: incision  -LN       Coping    Observed Emotional State  accepting;cooperative with encouragement  -TB        Verbalized Emotional State  acceptance  -TB           Plan of Care Review    Plan of Care Reviewed With  patient;family  -TB           Clinical Impression (OT)    Date of Referral to OT  08/12/19  -TB        OT Diagnosis  Impaired mobility and ADL  -TB         Patient/Family Goals Statement (OT Eval)  pt declines interest in rehab; requests home with HH  -TB        Criteria for Skilled Therapeutic Interventions Met (OT Eval)  yes;treatment indicated  -TB        Rehab Potential (OT Eval)  fair, will monitor progress closely  -TB        Therapy Frequency (OT Eval)  daily pending participation  -TB        Care Plan Review (OT)  evaluation/treatment results reviewed;care plan/treatment goals reviewed;risks/benefits reviewed;patient/other agree to care plan  -TB        Care Plan Review, Other Participant (OT Eval)  family  -TB        Anticipated Equipment Needs at Discharge (OT)  -- None  -TB        Anticipated Discharge Disposition (OT)  inpatient rehabilitation facility  -TB           Vital Signs    Pre Systolic BP Rehab  -- RN cleared OT  -TB        Pre Patient Position  Supine  -TB        Intra Patient Position  Supine  -TB        Post Patient Position  Supine  -TB           Planned OT Interventions    Planned Therapy Interventions (OT Eval)  strengthening exercise;occupation/activity based interventions;BADL retraining  -TB           OT Goals    Transfer Goal Selection (OT)  transfer, OT goal 1  -TB        Bathing Goal Selection (OT)  bathing, OT goal 1  -TB        Grooming Goal Selection (OT)  grooming, OT goal 1  -TB        Strength Goal Selection (OT)  strength, OT goal 1  -TB           Transfer Goal 1 (OT)    Activity/Assistive Device (Transfer Goal 1, OT)  sit-to-stand/stand-to-sit;bed-to-chair/chair-to-bed;walker, rolling  -TB        [place] Level/Cues Needed (Transfer Goal 1, OT)  moderate assist (50-74% patient effort);2 person assist  -TB        Time Frame (Transfer Goal 1, OT)  by discharge  -TB        Barriers (Transfers Goal 1, OT)  L BKA with poorly fitting prosthesis  -TB        Progress/Outcome (Transfer Goal 1, OT)  goal ongoing  -TB           Bathing Goal 1 (OT)    Activity/Assistive Device (Bathing Goal 1, OT)  upper body bathing  -TB         [place] Level/Cues Needed (Bathing Goal 1, OT)  minimum assist (75% or more patient effort)  -TB        Time Frame (Bathing Goal 1, OT)  by discharge  -TB        Barriers (Bathing Goal 1, OT)  Sponge bath sitting UIC  -TB        Progress/Outcomes (Bathing Goal 1, OT)  goal ongoing  -TB           Grooming Goal 1 (OT)    Activity/Device (Grooming Goal 1, OT)  grooming skills, all  -TB        [place] (Grooming Goal 1, OT)  set-up required  -TB        Time Frame (Grooming Goal 1, OT)  by discharge  -TB        Barriers (Grooming Goal 1, OT)  sitting UIC  -TB           Strength Goal 1 (OT)    Strength Goal 1 (OT)  Pt actively participates in daily HEP to support transfers and ADL performance. Demonstrates understanding for theraband HEP 3x10 reps daily  -TB        Time Frame (Strength Goal 1, OT)  by discharge  -TB        Progress/Outcome (Strength Goal 1, OT)  goal ongoing  -TB           Living Environment    Home Accessibility  tub/shower is not walk in ramp, built-in tub bench  -TB          User Key  (r) = Recorded By, (t) = Taken By, (c) = Cosigned By    Initials Name Effective Dates    TB Areli Nichols OT 06/08/18 -     Louann Carbajal RN 10/16/17 -          Occupational Therapy Education     Title: PT OT SLP Therapies (Done)     Topic: Occupational Therapy (Done)     Point: ADL training (Done)     Description: Instruct learner(s) on proper safety adaptation and remediation techniques during self care or transfers.   Instruct in proper use of assistive devices.    Learning Progress Summary           Patient Acceptance, E, VU,NR by TB at 8/13/2019  9:57 AM    Comment:  Education initiated for benefits of OOB activity/therapy, role of OT, benefits of HEP to support transfer/ADL performance and recommendation for rehab at d/c.   Family Acceptance, E, VU,NR by TB at 8/13/2019  9:57 AM    Comment:  Education initiated for benefits of OOB activity/therapy, role of OT, benefits of HEP to support  transfer/ADL performance and recommendation for rehab at d/c.                   Point: Home exercise program (Done)     Description: Instruct learner(s) on appropriate technique for monitoring, assisting and/or progressing therapeutic exercises/activities.    Learning Progress Summary           Patient Acceptance, E, VU,NR by  at 8/13/2019  9:57 AM    Comment:  Education initiated for benefits of OOB activity/therapy, role of OT, benefits of HEP to support transfer/ADL performance and recommendation for rehab at d/c.   Family Acceptance, E, VU,NR by TB at 8/13/2019  9:57 AM    Comment:  Education initiated for benefits of OOB activity/therapy, role of OT, benefits of HEP to support transfer/ADL performance and recommendation for rehab at d/c.                               User Key     Initials Effective Dates Name Provider Type Discipline     06/08/18 -  Areli Nichols, OT Occupational Therapist OT                  OT Recommendation and Plan  Outcome Summary/Treatment Plan (OT)  Anticipated Equipment Needs at Discharge (OT): (None)  Anticipated Discharge Disposition (OT): inpatient rehabilitation facility  Planned Therapy Interventions (OT Eval): strengthening exercise, occupation/activity based interventions, BADL retraining  Therapy Frequency (OT Eval): daily(pending participation)  Plan of Care Review  Plan of Care Reviewed With: patient, family  Plan of Care Reviewed With: patient, family  Outcome Summary: OT IE completed. Pt presents with significant weakness limiting transfers and ADL performance. Reluctant to participate in IP therapy, but requests HH. OT to follow IP pending participation. Pt would benefit from IRF at d/c for best outcome.     Outcome Measures     Row Name 08/13/19 0910             How much help from another is currently needed...    Putting on and taking off regular lower body clothing?  1  -TB      Bathing (including washing, rinsing, and drying)  2  -TB      Toileting (which  includes using toilet bed pan or urinal)  1  -TB      Putting on and taking off regular upper body clothing  2  -TB      Taking care of personal grooming (such as brushing teeth)  3  -TB      Eating meals  3  -TB      AM-PAC 6 Clicks Score (OT)  12  -TB         Functional Assessment    Outcome Measure Options  AM-PAC 6 Clicks Daily Activity (OT)  -TB        User Key  (r) = Recorded By, (t) = Taken By, (c) = Cosigned By    Initials Name Provider Type    TB Areli Nichols OT Occupational Therapist          Time Calculation:   Time Calculation- OT     Row Name 19 1000             Time Calculation- OT    OT Start Time  910  -TB      OT Received On  19  -TB      OT Goal Re-Cert Due Date  19  -TB        User Key  (r) = Recorded By, (t) = Taken By, (c) = Cosigned By    Initials Name Provider Type    TB Areli Nichols OT Occupational Therapist        Therapy Charges for Today     Code Description Service Date Service Provider Modifiers Qty    58021974698 HC OT EVAL HIGH COMPLEXITY 3 2019 Areli Nichols OT GO 1               Areli Nichols OT  2019    Electronically signed by Areli Nichols OT at 2019 10:01 AM          Discharge Summary      Cody Naqvi MD at 2019  2:10 PM              [facility]  DISCHARGE SUMMARY    Patient Name: Kendrick Crystal  : 1970  MRN: [id number]    Date of Admission: 2019  Date of Discharge:  2019 (Wednesday)  Primary Care Physician: Keyanna Leone APRN    Consults     Date and Time Order Name Status Description    2019 1003 Inpatient Nephrology Consult Completed     2019 0955 Inpatient Urology Consult Completed     2019 0844 Inpatient Infectious Diseases Consult Completed     2019 0030 Inpatient Colorectal Surgery Consult Completed         Jaime Rubio MD - Nephrology  Foster Rodriguez MD - Urology  Usman Dong MD - Infectious  Diseases  Mumtaz Payne MD - Colorectal Surgery - procedure in OR - trans-rectal drainage of claudia-rectal abscess    Hospital Course     Presenting Problem:   Sepsis (CMS/HCC) [A41.9]    Active Hospital Problems    Diagnosis  POA   • DM (diabetes mellitus) type II uncontrolled, periph vascular disorder (CMS/[facility]) [E11.51, E11.65]  Yes   • Peripheral vascular disease (CMS/[facility]) [I73.9]  Yes   • S/P BKA (below knee amputation), left (CMS/[facility]) [Z89.512]  Not Applicable   • JUAN (acute kidney injury) (CMS/[facility]) [N17.9]  Unknown   • History of MRSA infection [Z86.14]  Yes   • Type 2 diabetes mellitus with circulatory disorder and uncontrolled [E11.59]  Yes   • Essential hypertension [I10]  Yes   • Hyperlipemia [E78.5]  Yes   • DUNLAP Cirrhosis [K74.60]  Yes   • Non-compliance [Z91.14]  Not Applicable      Resolved Hospital Problems    Diagnosis Date Resolved POA   • **Sepsis (CMS/[facility]) [A41.9] 08/12/2019 Yes   • Abscess [L02.91] 08/12/2019 Yes   • UTI (urinary tract infection) [N39.0] 08/12/2019 Yes   • Hyponatremia [E87.1] 08/12/2019 Yes      Sepsis POA  Rectal Pain  Rectal Abscess  Long Term Uncontrolled DM  Acute Renal Failure  Gastroparesis    Hospital Course:  Kendrick Crystal is a 48 y.o. male admitted with rectal pain and concerns about rectal abscess.  She was seen by Dr. Payne and Infectious Diseases.  He went to the OR for operation on 8/2 with Dr. Payne for Trans-rectal drainage of claudia-rectal abscess. He was on IV antimicrobials while here and was followed by ID.   He developed acute renal failure while here and was seen by Nephrology.  Several medications stopped including gabapentin, lisinopril, and lasix and he was placed on bicarb.  He improved and wants to go home today.  I discussed this complex case today with Dr. Rubio, Dr. Dong, and Dr. Payne on the day of discharge.      Discharge Follow Up Recommendations for labs/diagnostics:    follow up with nephrology regarding renal function    Day of Discharge      HPI: asking to go home even before I have seen patient today.  Wife in room today asking to go home.  Therapy suggesting rehab, but patient and wife refusing this suggestion    Review of Systems    Gen- No fevers, chills  CV- No chest pain, palpitations  Resp- No cough, dyspnea  GI- No N/V/D, abd pain    Otherwise ROS is negative except as mentioned in the HPI.    Vital Signs:   Temp:  [97.4 °F (36.3 °C)-98.4 °F (36.9 °C)] 98.4 °F (36.9 °C)  Heart Rate:  [67-77] 72  Resp:  [16-18] 16  BP: (146-190)/() 153/72     Physical Exam:    Constitutional: No acute distress, awake, alert  HENT: NCAT, no JVD  Respiratory: Clear to auscultation bilaterally, respiratory effort normal   Cardiovascular: RRR, s1 and s2  Gastrointestinal: Positive bowel sounds, soft, nontender, nondistended  Musculoskeletal: no LE edema right LE; right BKA  Psychiatric: Appropriate affect, cooperative      Pertinent  and/or Most Recent Results     Results from last 7 days   Lab Units 08/14/19  0642 08/13/19  0410 08/12/19  0627 08/11/19  0603 08/10/19  0703 08/09/19  1030 08/09/19  0502 08/08/19  0738   WBC 10*3/mm3  --   --  13.70* 11.25* 12.73* 14.13*  --  13.61*   HEMOGLOBIN g/dL  --   --  11.2* 10.6* 10.6* 10.6*  --  10.9*   HEMATOCRIT %  --   --  34.9* 33.5* 33.2* 34.1*  --  35.1*   PLATELETS 10*3/mm3  --   --  255 248 242 258  --  246   SODIUM mmol/L 139 141  139 142 141 137  --  138 138   POTASSIUM mmol/L 3.5 3.9  3.9 4.5 4.5 5.1  --  5.0 5.2   CHLORIDE mmol/L 96* 100  99 101 105 106  --  104 104   CO2 mmol/L 26.0 21.0*  25.0 25.0 20.0* 17.0*  --  17.0* 17.0*   BUN mg/dL 44* 50*  49* 59* 66* 66*  --  63* 61*   CREATININE mg/dL 2.58* 3.00*  3.24* 3.69* 4.48* 4.92*  --  5.29* 4.94*   GLUCOSE mg/dL 234* 193*  195* 170* 103* 155*  --  115* 234*   CALCIUM mg/dL 8.4* 8.4*  8.1* 8.3* 8.0* 7.8*  --  7.7* 8.1*     Results from last 7 days   Lab Units 08/13/19  0410   BILIRUBIN mg/dL <0.2*   ALK PHOS U/L 196*   ALT (SGPT) U/L 12   AST  (SGOT) U/L 16           Invalid input(s): TG, LDLCALC, LDLREALC    Brief Urine Lab Results  (Last result in the past 365 days)      Color   Clarity   Blood   Leuk Est   Nitrite   Protein   CREAT   Urine HCG        08/04/19 1310             34.7           Microbiology Results Abnormal     Procedure Component Value - Date/Time    Stool Culture - Stool, Per Rectum [[id number]] Collected:  08/10/19 0324    Lab Status:  Final result Specimen:  Stool from Per Rectum Updated:  08/14/19 0911     Salmonella/Shigella Screen Final report     Result 1 Comment     Comment: No Salmonella or Shigella recovered.        Campylobacter Culture Final report     Result 1 Comment     Comment: No Campylobacter species isolated.        E coli, Shiga toxin Assay Negative    Narrative:       Performed at:  [address] [place] [address]  [id number]  : Dl Laird PhD, Phone:  [phone]    Fecal Leukocytes - Stool, Per Rectum [[id number]]  (Normal) Collected:  08/10/19 0324    Lab Status:  Final result Specimen:  Stool from Per Rectum Updated:  08/10/19 0905     Fecal Leukocytes No WBC's Seen    Anaerobic Culture - Wound, Rectal Abscess [[id number]] Collected:  08/02/19 1620    Lab Status:  Final result Specimen:  Wound from Rectal Abscess Updated:  08/07/19 0914     Anaerobic Culture No anaerobes isolated at 5 days    Wound Culture - Wound, Rectal Abscess [[id number]]  (Abnormal)  (Susceptibility) Collected:  08/02/19 1620    Lab Status:  Final result Specimen:  Wound from Rectal Abscess Updated:  08/06/19 0922     Wound Culture Scant growth (1+) Escherichia coli      Scant growth (1+) Klebsiella pneumoniae ssp pneumoniae      Scant growth (1+) Normal Fecal Haily     Gram Stain Few (2+) WBCs seen      Occasional Yeast      Rare (1+) Gram negative bacilli    Narrative:       Specimen is an abscess - work up all GNRs per Dr. Dong ([facility]).    Susceptibility      Escherichia coli     EYAD      Ampicillin Resistant     Ampicillin + Sulbactam Intermediate     Cefazolin Susceptible     Cefepime Susceptible     Ceftazidime Susceptible     Ceftriaxone Susceptible     Gentamicin Susceptible     Levofloxacin Resistant     Piperacillin + Tazobactam Susceptible     Tetracycline Susceptible     Trimethoprim + Sulfamethoxazole Susceptible                Susceptibility      Klebsiella pneumoniae ssp pneumoniae     EYAD     Ampicillin Resistant     Ampicillin + Sulbactam Susceptible     Cefazolin Susceptible     Cefepime Susceptible     Ceftazidime Susceptible     Ceftriaxone Susceptible     Gentamicin Susceptible     Levofloxacin Susceptible     Piperacillin + Tazobactam Susceptible     Tetracycline Susceptible     Trimethoprim + Sulfamethoxazole Susceptible                    Blood Culture - Blood, Hand, Left [[id number]] Collected:  07/30/19 1845    Lab Status:  Final result Specimen:  Blood from Hand, Left Updated:  08/04/19 2145     Blood Culture No growth at 5 days    Blood Culture - Blood, Arm, Right [[id number]] Collected:  07/30/19 1840    Lab Status:  Final result Specimen:  Blood from Arm, Right Updated:  08/04/19 2145     Blood Culture No growth at 5 days    Urine Culture - Urine, Urine, Clean Catch [[id number]]  (Normal) Collected:  07/30/19 1725    Lab Status:  Final result Specimen:  Urine, Clean Catch Updated:  07/31/19 2019     Urine Culture No growth        Imaging Results (all)     Procedure Component Value Units Date/Time    US Renal Limited [[id number]] Collected:  08/05/19 1722     Updated:  08/07/19 1702    Narrative:       EXAMINATION: US RENAL LIMITED-     INDICATION: Acute kidney injury.      TECHNIQUE: Ultrasound kidneys and urinary bladder.     COMPARISON: None.     FINDINGS: Right kidney measures 12.3 cm in length without evidence of  hydronephrosis, contour deforming mass or obvious calculi.     Left kidney measures 13.3 cm in length without evidence of  hydronephrosis, contour deforming mass  or obvious calculi.     Urinary bladder is decompressed with Walker catheter in situ.       Impression:       No acute sonographic abnormality within the visualized  kidneys. No hydronephrosis. Walker catheter in situ with no gross  abnormality of the decompressed urinary bladder.      D:  08/05/2019  E:  08/06/2019     This report was finalized on 8/7/2019 4:59 PM by Dr. Jb Campos.       CT Abdomen Pelvis With Contrast [[id number]] Collected:  07/30/19 1749     Updated:  07/31/19 1034    Narrative:       EXAMINATION: CT ABDOMEN PELVIS W CONTRAST- 07/30/2019      INDICATION: lower abd pain     TECHNIQUE: CT scan of the abdomen and pelvis was performed with  intravenous contrast.     The radiation dose reduction device was turned on for each scan per the  ALARA (As Low as Reasonably Achievable) protocol.     COMPARISON: 05/30/2019     FINDINGS: The most superior images demonstrate no basilar inflammatory  inflammatory process or pleural effusion. Small effusions have resolved  since previous examination. The liver and spleen are normal. There are  small bilateral fat-containing adrenal nodules consistent bilateral  myolipomas, unchanged. The pancreas is normal. There is no renal mass,  stone or obstruction. There is no ascites,  aneurysm or retroperitoneal  lymphadenopathy. There is diffuse thickening of the bladder wall,  probably related to the peroneal abscess. There is no inguinal  lymphadenopathy. There are sigmoid diverticula without features of  diverticulitis. The appendix is normal.     Just inferior to the prostate gland and anterior to the distal rectum  there is a 3.0 x 3.2 cm fluid collection with enhancing margins  consistent with an abscess.       Impression:       There is a 3.0 x 3.2 cm fluid collection with enhancing  margins consistent with an abscess related to the inferior aspect of the  prostate gland and anterior to the distal rectum.     D:  07/30/2019  E:  07/31/2019     This report was  finalized on 7/31/2019 10:31 AM by Dr. Guillermo Akbar MD.           Results for orders placed during the hospital encounter of 01/25/17   Doppler Arterial Multi Level Lower Extremity - Bilateral CAR    Narrative · Right Conclusion: The right CARLI is normal.  · Left Conclusion: The left CARLI could not be calculated due to   noncompressible vessels, most probably due to calcifications. Waveforms   are suggestive of disease involving the tibioperoneal arteries.        Results for orders placed during the hospital encounter of 01/25/17   Doppler Arterial Multi Level Lower Extremity - Bilateral CAR    Narrative · Right Conclusion: The right CARLI is normal.  · Left Conclusion: The left CARLI could not be calculated due to   noncompressible vessels, most probably due to calcifications. Waveforms   are suggestive of disease involving the tibioperoneal arteries.        Results for orders placed during the hospital encounter of 05/18/19   Adult Transthoracic Echo Complete W/ Cont if Necessary Per Protocol    Narrative · Estimated EF = 60%.  · Mild mitral valve regurgitation is present  · Mild tricuspid valve regurgitation is present.  · Calculated right ventricular systolic pressure from tricuspid   regurgitation is 29 mmHg.          Discharge Details        Discharge Medications      New Medications      Instructions Start Date   amLODIPine 10 MG tablet  Commonly known as:  NORVASC   10 mg, Oral, Every 24 Hours Scheduled      cefuroxime 500 MG tablet  Commonly known as:  CEFTIN   500 mg, Oral, 2 Times Daily      erythromycin base 250 MG tablet  Commonly known as:  E-MYCIN   250 mg, Oral, 3 Times Daily With Meals      hydrALAZINE 100 MG tablet  Commonly known as:  APRESOLINE   100 mg, Oral, Every 8 Hours Scheduled      metroNIDAZOLE 500 MG tablet  Commonly known as:  FLAGYL   500 mg, Oral, 2 Times Daily      saccharomyces boulardii 250 MG capsule  Commonly known as:  FLORASTOR   250 mg, Oral, 2 Times Daily      sevelamer 0.8 g pack  packet  Commonly known as:  RENVELA   800 mg, Oral, 3 Times Daily With Meals      terazosin 5 MG capsule  Commonly known as:  HYTRIN   5 mg, Oral, Nightly         Changes to Medications      Instructions Start Date   Metoprolol Tartrate 75 MG tablet  What changed:  how much to take   100 mg, Oral, Every 12 Hours Scheduled         Continue These Medications      Instructions Start Date   DULoxetine 30 MG capsule  Commonly known as:  CYMBALTA   30 mg, Oral, Daily      HYDROcodone-acetaminophen  MG per tablet  Commonly known as:  NORCO   1 tablet, Oral, 3 Times Daily PRN      insulin lispro 100 UNIT/ML injection  Commonly known as:  humaLOG   5 Units, Subcutaneous, 4 Times Daily With Meals & Nightly, Plus sliding scale      LANTUS 100 UNIT/ML injection  Generic drug:  insulin glargine   30 Units, Subcutaneous, 2 Times Daily      tamsulosin 0.4 MG capsule 24 hr capsule  Commonly known as:  FLOMAX   0.8 mg, Oral, Nightly         Stop These Medications    furosemide 40 MG tablet  Commonly known as:  LASIX     gabapentin 400 MG capsule  Commonly known as:  NEURONTIN     Lactobacillus tablet     lisinopril 40 MG tablet  Commonly known as:  PRINIVIL,ZESTRIL          No Known Allergies    Discharge Disposition:  Home or Self Care    Discharge Diet:  Diet Order   Procedures   • Diet Soft Texture; Whole Foods; Consistent Carbohydrate; Low Phosphorus     Discharge Activity:   as tolerated    CODE STATUS:    Code Status and Medical Interventions:   Ordered at: 07/30/19 2051     Code Status:    CPR     Medical Interventions (Level of Support Prior to Arrest):    Full     Future Appointments   Date Time Provider Department Center   8/20/2019  3:15 PM Garcia Lenz MD [facility] None   8/26/2019 11:15 AM Keyanna Leone APRN [facility] None   10/1/2019 11:15 AM Keyanna Leone APRN [facility] None     Additional Instructions for the Follow-ups that You Need to Schedule     Discharge Follow-up with PCP   As  directed       Currently Documented PCP:    Keyanna Leone APRN    PCP Phone Number:    [phone]     Follow Up Details:  Primary Care - [facility]INT[facility] Ku - 1 week - outpatient referral for sleep study; blood pressure check         Discharge Follow-up with Specified Provider: Dr. Dong; 2 Weeks   As directed      To:  Dr. Dong    Follow Up:  2 Weeks    Follow Up Details:  Perirectal abscess follow up         Discharge Follow-up with Specified Provider: Dr. Payne; 2 Weeks   As directed      To:  Dr. Payne    Follow Up:  2 Weeks    Follow Up Details:  Follow up perirectal abscess         Discharge Follow-up with Specified Provider: [facility]; 1 Week   As directed      To:  [facility]    Follow Up:  1 Week    Follow Up Details:  Acute kidney injury             Follow up with Dr. Payne in 2 weeks  Follow up in [facility]    Encouraged patient to have an outpatient sleep study.    Time Spent on Discharge:  45 minutes    Electronically signed by Cody Naqvi MD, 08/14/19, 2:10 PM.      Electronically signed by Cody Naqvi MD at 8/14/2019  2:30 PM

## 2019-08-14 NOTE — PROGRESS NOTES
[facility] Progress Note        Antibiotics:  Anti-Infectives (From admission, onward)    Ordered     Dose/Rate Route Frequency Start Stop    08/13/19 1306  erythromycin base (E-MYCIN) tablet 250 mg     Ordering Provider:  Jaime Rubio MD    250 mg Oral 3 Times Daily With Meals 08/13/19 1400 08/16/19 1159    08/12/19 1019  cefuroxime (CEFTIN) 500 MG tablet     Ordering Provider:  Jazmín Garcia MD    500 mg Oral Daily 08/13/19 0000 08/20/19 2359    08/12/19 1019  metroNIDAZOLE (FLAGYL) 500 MG tablet     Ordering Provider:  Jazmín Garcia MD    500 mg Oral 2 Times Daily 08/13/19 0000 08/20/19 2359    08/09/19 1224  micafungin 100 mg/100 mL 0.9% NS IVPB (mbp)     Ordering Provider:  Sandra White MD    100 mg  over 60 Minutes Intravenous Every 24 Hours 08/09/19 1400 08/19/19 1359    08/06/19 1426  cefTRIAXone (ROCEPHIN) 1 g/100 mL 0.9% NS (MBP)     Ordering Provider:  Shahbaz Valdivia MD    1 g  over 30 Minutes Intravenous Every 24 Hours 08/06/19 1600 08/20/19 1559    08/06/19 1426  metroNIDAZOLE (FLAGYL) 500 mg/100mL IVPB     Ordering Provider:  Shahbaz Valdivia MD    500 mg  over 60 Minutes Intravenous Every 8 Hours 08/06/19 1600 08/20/19 1559    07/31/19 0955  micafungin 100 mg/100 mL 0.9% NS IVPB (mbp)     Ordering Provider:  Usman Dong MD    100 mg  over 60 Minutes Intravenous Every 24 Hours 07/31/19 1100 08/09/19 1328    07/30/19 2051  [MAR Hold]  piperacillin-tazobactam (ZOSYN) 3.375 g in iso-osmotic dextrose 50 ml (premix)     (MAR Hold since 08/02/19 1447)   Comments:  First dose given in ED.   Ordering Provider:  Keyanna Aponte APRN    3.375 g  over 4 Hours Intravenous Every 8 Hours 07/31/19 0100 08/03/19 0059    07/30/19 1820  vancomycin 2250 mg/500 mL 0.9% NS IVPB ([facility])     Ordering Provider:  Kendrick Sotelo MD    20 mg/kg × 113 kg  over 3 Hours Intravenous Once 07/30/19 1822 07/30/19 2155 07/30/19 1820  piperacillin-tazobactam (ZOSYN) 3.375 g in iso-osmotic dextrose 50 ml (premix)      Ordering Provider:  Kendrick Sotelo MD    3.375 g  over 30 Minutes Intravenous Once 19          CC: rectal pain    HPI:    Patient is a 48 y.o.  Yr old male with history of poorly contolled T2DM, DUNLAP/liver cirrhosis, HTN, PVD/Left BKA, and multiple skin abscesses/MRSA who presented to [facility] ED with right shin wound infection summer 2018.  He was unable to get to see Dr. Martinez as his father passed away unexpectedly on 18 and he had to wait until after the .  The wound worsened becoming gangrenous and he came to [facility] ED in 2018.  He also had been hospitalized at [facility] and then transferred to  for a severe penis/scrotum infection requiring surgical debridement in summer 2018.  He received antibiotics regarding his right leg infection, generally improved over the remainder of summer 2018 but that area had not completely healed. He was admitted 2019 with acute left lower abdominal wall redness/swelling and pain, spontaneous drainage associated with fever/chills and uncontrolled blood sugars.   I&D requiring wide debridement , severe/deep infection and transferred to [facility] on May 3 with IV Zosyn/oral doxycycline. Cultures had lactobacillus and mixed gram-positive skin sherly including alpha strep, staph epidermidis and corynebacterium. Readmitted to [facility] May 18, 2019, increasing generalized edema ultimately transitioned to oral doxycycline and healed.     He presented to the emergency room 2019 with acute rectal pain that had begun ; this is associated with constipation for days, chills and nausea/dry heaving.  White blood cell count elevated, high hemoglobin A1c over 13, urinalysis with hematuria/pyuria and CT scan with 3 x 3 cm fluid collection anterior to the distal rectum and per discussion with Dr. Akbar/Jennifer, this is not in the prostate.  Colorectal surgery/urology saw him for  consideration of surgical options/timing/threshold, etc..     8/2/19 I&Dper Dr Payne perirectal abscess; patient reports that he had instrumented his rectum prior to hospitalization with enema    8/3/19 urinary retention overnight requiring in/out catheterization per patient.  Creat climb    8/4/19 further creat climb; childs placed and lisinopril stopped and d/w Dr Rubio    8/7 in retrospect he remembers air in his urine at admit which has raised concern for possible fistula from urinary tract;  No fecaluria and culture from abscess is fecal sherly;  ?rectal-urethral fistula;  Dr Payne aware     8/13/19 he has dry heaves, had adjustment in antiemetics and erythromycin added by medicine    8/14/19 feels much better after stopping zofran and starting phenrgan/erythromycin;  Tolerated full diet;  he reports less  fatigue and less perirectal pain, currently dull, nonradiating, better with pain meds, 2-3 out of 10 at present.  He denies hematochezia melena or hematemesis.  No diarrhea.  Recent Constipation as above.  No  hematuria or pyuria.       No headache photophobia or neck stiffness.  No shortness of breath cough or hemoptysis.  No skin rash.       ROS:      8/14/19 No f/c/s. No n/v/d. No rash. No new ADR to Abx.       Constitutional--appetite diminished with malaise.  No sweats   Heent-- No new vision, hearing or throat complaints.  No epistaxis or oral sores.  Denies odynophagia or dysphagia.  No flashers, floaters or eye pain. No odynophagia or dysphagia. No headache, photophobia or neck stiffness.  CV-- No chest pain, palpitation or syncope  Resp-- No SOB/cough/Hemoptysis  GI- No  hematochezia, melena, or hematemesis. Denies jaundice ; he has chronic liver disease  --as above.  No dysuria, hematuria, or flank pain.  Denies hesitancy, urgency or flank pain.  Lymph- no swollen lymph nodes in neck/axilla or groin.   Heme- No active bruising or bleeding; no Hx of DVT or PE.  MS-- no swelling or pain in the bones  "or joints of arms/legs.  No new back pain.  Neuro-- No acute focal weakness or numbness in the arms or legs.  No seizures.     Full 12 point review of systems reviewed and negative otherwise for acute complaints, except for above          PE:   BP (!) 186/100 (BP Location: Left arm, Patient Position: Lying) Comment: nurse notified  Pulse 75   Temp 97.6 °F (36.4 °C) (Oral)   Resp 16   Ht 190.5 cm (75\")   Wt 113 kg (250 lb)   SpO2 94%   BMI 31.25 kg/m²       GENERAL: awake, in no acute distress.   HEENT: Normocephalic, atraumatic.  PERRL. EOMI. No conjunctival injection. No icterus. Oropharynx clear without evidence of thrush or exudate. No evidence of peridontal disease.    NECK: Supple without nuchal rigidity. No mass.  LYMPH: No cervical, axillary or inguinal lymphadenopathy.  HEART: RRR; No murmur, rubs, gallops.   LUNGS: Clear to auscultation bilaterally without wheezing, rales, rhonchi. Normal respiratory effort. Nonlabored. No dullness.  ABDOMEN: Soft, nontender, nondistended. Positive bowel sounds. No rebound or guarding. NO mass or HSM.  EXT:  No cyanosis, clubbing or edema. No cord.  : Genitalia generally unremarkable.  Unable to elicit any perineal tenderness.  No discrete mass bulge or fluctuance.  No crepitus or bulla.  I am unable to elicit any perianal tenderness with no obvious bulge or fluctuance there either.   candidiasis resolved  MSK: FROM without joint effusions noted arms/legs.    SKIN: Warm and dry without cutaneous eruptions on Inspection/palpation.    NEURO: sleepy     No peripheral stigmata/phenomena of endocarditis           Laboratory Data    Results from last 7 days   Lab Units 08/12/19  0627 08/11/19  0603 08/10/19  0703   WBC 10*3/mm3 13.70* 11.25* 12.73*   HEMOGLOBIN g/dL 11.2* 10.6* 10.6*   HEMATOCRIT % 34.9* 33.5* 33.2*   PLATELETS 10*3/mm3 255 248 242     Results from last 7 days   Lab Units 08/14/19  0642   SODIUM mmol/L 139   POTASSIUM mmol/L 3.5   CHLORIDE mmol/L 96* "   CO2 mmol/L 26.0   BUN mg/dL 44*   CREATININE mg/dL 2.58*   GLUCOSE mg/dL 234*   CALCIUM mg/dL 8.4*     Results from last 7 days   Lab Units 08/13/19  0410   ALK PHOS U/L 196*   BILIRUBIN mg/dL <0.2*   ALT (SGPT) U/L 12   AST (SGOT) U/L 16               Estimated Creatinine Clearance: 46.5 mL/min (A) (by C-G formula based on SCr of 2.58 mg/dL (H)).      Microbiology:      Radiology:  Imaging Results (last 72 hours)     Procedure Component Value Units Date/Time    CT Abdomen Pelvis With Contrast [[id number]] Collected:  07/30/19 1749     Updated:  07/31/19 1034    Narrative:       EXAMINATION: CT ABDOMEN PELVIS W CONTRAST- 07/30/2019      INDICATION: lower abd pain     TECHNIQUE: CT scan of the abdomen and pelvis was performed with  intravenous contrast.     The radiation dose reduction device was turned on for each scan per the  ALARA (As Low as Reasonably Achievable) protocol.     COMPARISON: 05/30/2019     FINDINGS: The most superior images demonstrate no basilar inflammatory  inflammatory process or pleural effusion. Small effusions have resolved  since previous examination. The liver and spleen are normal. There are  small bilateral fat-containing adrenal nodules consistent bilateral  myolipomas, unchanged. The pancreas is normal. There is no renal mass,  stone or obstruction. There is no ascites,  aneurysm or retroperitoneal  lymphadenopathy. There is diffuse thickening of the bladder wall,  probably related to the peroneal abscess. There is no inguinal  lymphadenopathy. There are sigmoid diverticula without features of  diverticulitis. The appendix is normal.     Just inferior to the prostate gland and anterior to the distal rectum  there is a 3.0 x 3.2 cm fluid collection with enhancing margins  consistent with an abscess.       Impression:       There is a 3.0 x 3.2 cm fluid collection with enhancing  margins consistent with an abscess related to the inferior aspect of the  prostate gland and anterior to the  distal rectum.     D:  07/30/2019  E:  07/31/2019     This report was finalized on 7/31/2019 10:31 AM by Dr. Guillermo Akbar MD.               Impression:     --Acute sepsis per internal medicine admission note,  abscess in the perirectal tissue as etiology.  mixed infection.  drainage 8/2; culture data noted;  Broad-spectrum antimicrobials ongoing.     --Acute perirectal abscess associated with constipation as above, air in urine as above.  Colorectal surgery, Dr. Payne following to help guide timing/options/threshold for further drainage if needed.  Drainage as above on August 2 ;  Mixed Fecal sherly in culture     --History urinary retention.    Recurrent retention overnight August 2-August 3.  Medicine following to help guide further work-up, urology input, etc.    --ARF 8/3-8/13;  ?obstruction initially associated with retention postop (1200 out with I/O cath postop per nursing) -v- contrast from CT -v- lisinopril/medication/vancomycin -v- relative hypotension -v- likely combination of factors with ATN; nephrology following; vancomycin trough high and vancomycin stopped empirically 8/3 although high trough potentially accumulation as a consequence of diminishing renal function associated with retention/contrast/pressure rather than cause.  Nonetheless, avoid for now; creatinine trending down.    --Cutaneous candidiasis and yeast in gram stain of abscess.  cutaneous findings resolved at present and likely stop mycamine at discharge     --History MRSA;  Not in current culture     --Diabetes mellitus type 2, uncontrolled.  He reports the reason for this is forgetfulness     --History Johnston and chronic liver disease per past notes     --Left BKA     --Peripheral vascular disease    --hyperkalemia per medicine/nephrology        PLAN:    --IV rocephin/flagyl/ Mycamine while here but anticipate taper to oral omnicef/flagyl for 1 additional week at discharge     --Check/review labs cultures and scans     --Discussed with  microbiology     --History per nursing staff     --Discussed with Nephroogy     --Discussed with family, partial history per them     --Highly complex set of issues with high risk for further serious morbidity and other serious sequela    --as above;  Anticipate taper to oral omnicef/flagyl for home x 1 more week and if stable at followpu then consider stop abx all together at that time;  F/u with me 1 week            Usman Dong MD  8/14/2019

## 2019-08-14 NOTE — PROGRESS NOTES
"   LOS: 15 days    Patient Care Team:  Keyanna Leone APRN as PCP - General (Nurse Practitioner)    Reason For Visit:  F/U JUAN  Subjective       Yesterday was started on azithromycin discontinued Zofran he feels much better and he actually is tolerating food  Review of Systems:    Pulm: No soa   CV:  No CP.    GI: NO N/V/D/ANOREXIA      Objective       amLODIPine 10 mg Oral Q24H   carvedilol 25 mg Oral Q12H   ceftriaxone 1 g Intravenous Q24H   DULoxetine 30 mg Oral Daily   erythromycin base 250 mg Oral TID With Meals   heparin (porcine) 5,000 Units Subcutaneous Q8H   hydrALAZINE 100 mg Oral Q8H   insulin detemir 30 Units Subcutaneous Q12H   insulin lispro 0-9 Units Subcutaneous 4x Daily With Meals & Nightly   insulin lispro 5 Units Subcutaneous TID With Meals   metroNIDAZOLE 500 mg Intravenous Q8H   micafungin (MYCAMINE)  mg Intravenous Q24H   saccharomyces boulardii 250 mg Oral BID   sevelamer 800 mg Oral TID With Meals   sodium bicarbonate 1,300 mg Oral BID   tamsulosin 0.8 mg Oral Nightly   terazosin 5 mg Oral Nightly            Vital Signs:  Blood pressure 153/72, pulse 72, temperature 98.4 °F (36.9 °C), temperature source Oral, resp. rate 16, height 190.5 cm (75\"), weight 113 kg (250 lb), SpO2 94 %.    Flowsheet Rows      First Filed Value   Admission Height  190.5 cm (75\") Documented at 07/30/2019 1623   Admission Weight  113 kg (250 lb) Documented at 07/30/2019 1623          08/13 0701 - 08/14 0700  In: 200   Out: -     Physical Exam:    General Appearance: NAD, alert and cooperative, Ox3  Eyes: PER, conjunctivae and sclerae normal, no icterus  Lungs: respirations regular and unlabored, no crepitus, clear to auscultation  Heart/CV: regular rhythm & normal rate, no murmur, no gallop, no rub and no edema  Abdomen: not distended, soft, non-tender, no masses,  bowel sounds present  Skin: No rash, Warm and dry    Radiology:            Labs:  Results from last 7 days   Lab Units 08/12/19  0627 " 08/11/19  0603 08/10/19  0703   WBC 10*3/mm3 13.70* 11.25* 12.73*   HEMOGLOBIN g/dL 11.2* 10.6* 10.6*   HEMATOCRIT % 34.9* 33.5* 33.2*   PLATELETS 10*3/mm3 255 248 242     Results from last 7 days   Lab Units 08/14/19  0642 08/13/19  0410 08/12/19  0627 08/11/19  0603 08/10/19  0703   SODIUM mmol/L 139 141  139 142 141 137   POTASSIUM mmol/L 3.5 3.9  3.9 4.5 4.5 5.1   CHLORIDE mmol/L 96* 100  99 101 105 106   CO2 mmol/L 26.0 21.0*  25.0 25.0 20.0* 17.0*   BUN mg/dL 44* 50*  49* 59* 66* 66*   CREATININE mg/dL 2.58* 3.00*  3.24* 3.69* 4.48* 4.92*   CALCIUM mg/dL 8.4* 8.4*  8.1* 8.3* 8.0* 7.8*   PHOSPHORUS mg/dL  --  6.1*  --  7.9* 8.2*   ALBUMIN g/dL  --  2.90*  --  2.50* 2.50*     Results from last 7 days   Lab Units 08/14/19  0642   GLUCOSE mg/dL 234*       Results from last 7 days   Lab Units 08/13/19  0410   ALK PHOS U/L 196*   BILIRUBIN mg/dL <0.2*   ALT (SGPT) U/L 12   AST (SGOT) U/L 16                 Estimated Creatinine Clearance: 46.5 mL/min (A) (by C-G formula based on SCr of 2.58 mg/dL (H)).      Assessment       DUNLAP Cirrhosis    Essential hypertension    Non-compliance    Hyperlipemia    Type 2 diabetes mellitus with circulatory disorder and uncontrolled    History of MRSA infection    JUAN (acute kidney injury) (CMS/HCC)    S/P BKA (below knee amputation), left (CMS/HCC)    Peripheral vascular disease (CMS/HCC)    DM (diabetes mellitus) type II uncontrolled, periph vascular disorder (CMS/HCC)            Impression:   JUAN WITH IMPROVING GFR, slow.   HYPERPHOSPHATEMIA IMPROVING ON BINDER.   ANEMIA.      Recommendations:  I have added oral erythromycin to help in gastroparesis  Creatinine is improving further is making good urine  I have raised his hydralazine also for better blood pressure control  If his blood pressure is better  and he is medically stable from iron he can be discharged and follow-up in our clinic within 2 weeks after discharge with labs    Jaime Rubio MD  08/14/19  11:37 AM

## 2019-08-14 NOTE — PROGRESS NOTES
Case Management Discharge Note    Final Note: Plan is Home w/ [facility]. Spoke with Carine at [facility] and faxed referral. Denies any other discharge needs. Family will transport.    Destination      No service has been selected for the patient.      Durable Medical Equipment      No service has been selected for the patient.      Dialysis/Infusion      No service has been selected for the patient.      Home Medical Care - Selection Complete      Service Provider Request Status Selected Services Address Phone Number Fax Number    [facility] [address] [place], [place] [id number] [phone]      Therapy      No service has been selected for the patient.      Community Resources      No service has been selected for the patient.             Final Discharge Disposition Code: 06 - home with home health care

## 2019-08-15 ENCOUNTER — TRANSITIONAL CARE MANAGEMENT TELEPHONE ENCOUNTER (OUTPATIENT)
Dept: INTERNAL MEDICINE | Facility: CLINIC | Age: 49
End: 2019-08-15

## 2019-08-15 ENCOUNTER — READMISSION MANAGEMENT (OUTPATIENT)
Dept: CALL CENTER | Facility: HOSPITAL | Age: 49
End: 2019-08-15

## 2019-08-15 ENCOUNTER — TELEPHONE (OUTPATIENT)
Dept: INTERNAL MEDICINE | Facility: CLINIC | Age: 49
End: 2019-08-15

## 2019-08-15 DIAGNOSIS — R11.2 NAUSEA AND VOMITING, INTRACTABILITY OF VOMITING NOT SPECIFIED, UNSPECIFIED VOMITING TYPE: Primary | ICD-10-CM

## 2019-08-15 RX ORDER — PROMETHAZINE HYDROCHLORIDE 12.5 MG/1
12.5 TABLET ORAL EVERY 6 HOURS PRN
Qty: 20 TABLET | Refills: 0 | Status: SHIPPED | OUTPATIENT
Start: 2019-08-15 | End: 2019-10-23 | Stop reason: HOSPADM

## 2019-08-15 NOTE — OUTREACH NOTE
Spoke with wife, pt not doing well. Severe fairly constant episodes of dry heaving, cannot eat, able to take small sips of water, not taking meds well right now. Wife has already spoken with [facility]anna [facility]OD[facility] office. Phenergan prescribed and pt has just started taking this. If this does not resolve dry heaving wife trang be taking pt back to ED.

## 2019-08-15 NOTE — OUTREACH NOTE
Prep Survey      Responses   Facility patient discharged from?  [facility]   Is patient eligible?  Yes   Discharge diagnosis  Sepsis,  trans-rectal drainage of claudia-rectal abscess   Does the patient have one of the following disease processes/diagnoses(primary or secondary)?  Sepsis   Does the patient have Home health ordered?  Yes   What is the Home health agency?   [facility]   Is there a DME ordered?  No   Prep survey completed?  Yes          Ashleigh Diamond RN

## 2019-08-16 ENCOUNTER — READMISSION MANAGEMENT (OUTPATIENT)
Dept: CALL CENTER | Facility: HOSPITAL | Age: 49
End: 2019-08-16

## 2019-08-16 ENCOUNTER — TELEPHONE (OUTPATIENT)
Dept: INTERNAL MEDICINE | Facility: CLINIC | Age: 49
End: 2019-08-16

## 2019-08-16 NOTE — OUTREACH NOTE
Sepsis Week 1 Survey      Responses   Facility patient discharged from?  [facility]   Does the patient have one of the following disease processes/diagnoses(primary or secondary)?  Sepsis   Is there a successful TCM telephone encounter documented?  Yes          Sima Herbert RN

## 2019-08-16 NOTE — TELEPHONE ENCOUNTER
Patient's wife called saying that he is unable to keep fluids down and is lethargic.  He has been taking Phenergan without relief of his symptoms.  I have advised him to go to the emergency department

## 2019-08-17 ENCOUNTER — APPOINTMENT (OUTPATIENT)
Dept: CT IMAGING | Facility: HOSPITAL | Age: 49
End: 2019-08-17

## 2019-08-17 ENCOUNTER — APPOINTMENT (OUTPATIENT)
Dept: GENERAL RADIOLOGY | Facility: HOSPITAL | Age: 49
End: 2019-08-17

## 2019-08-17 ENCOUNTER — HOSPITAL ENCOUNTER (INPATIENT)
Facility: HOSPITAL | Age: 49
LOS: 6 days | Discharge: HOME HEALTH | End: 2019-08-23
Attending: EMERGENCY MEDICINE | Admitting: HOSPITALIST

## 2019-08-17 DIAGNOSIS — Z79.4 TYPE 2 DIABETES MELLITUS WITH DIABETIC PERIPHERAL ANGIOPATHY AND GANGRENE, WITH LONG-TERM CURRENT USE OF INSULIN (HCC): Chronic | ICD-10-CM

## 2019-08-17 DIAGNOSIS — R53.1 GENERALIZED WEAKNESS: Primary | ICD-10-CM

## 2019-08-17 DIAGNOSIS — A41.9 SEPSIS AFFECTING SKIN: ICD-10-CM

## 2019-08-17 DIAGNOSIS — D72.829 LEUKOCYTOSIS, UNSPECIFIED TYPE: ICD-10-CM

## 2019-08-17 DIAGNOSIS — Z78.9 FAILURE OF OUTPATIENT TREATMENT: ICD-10-CM

## 2019-08-17 DIAGNOSIS — Z89.512 S/P BKA (BELOW KNEE AMPUTATION), LEFT (HCC): ICD-10-CM

## 2019-08-17 DIAGNOSIS — K61.1 PERIRECTAL ABSCESS: ICD-10-CM

## 2019-08-17 DIAGNOSIS — E11.52 TYPE 2 DIABETES MELLITUS WITH DIABETIC PERIPHERAL ANGIOPATHY AND GANGRENE, WITH LONG-TERM CURRENT USE OF INSULIN (HCC): Chronic | ICD-10-CM

## 2019-08-17 PROBLEM — R65.10 SIRS (SYSTEMIC INFLAMMATORY RESPONSE SYNDROME) (HCC): Status: ACTIVE | Noted: 2019-07-30

## 2019-08-17 PROBLEM — E87.20 LACTIC ACIDOSIS: Status: ACTIVE | Noted: 2019-08-17

## 2019-08-17 PROBLEM — K31.84 GASTROPARESIS: Status: ACTIVE | Noted: 2019-08-17

## 2019-08-17 PROBLEM — E86.0 DEHYDRATION: Status: ACTIVE | Noted: 2019-08-17

## 2019-08-17 PROBLEM — N17.9 ACUTE KIDNEY INJURY SUPERIMPOSED ON CHRONIC KIDNEY DISEASE (HCC): Status: RESOLVED | Noted: 2018-07-29 | Resolved: 2019-08-17

## 2019-08-17 PROBLEM — N18.9 ACUTE KIDNEY INJURY SUPERIMPOSED ON CHRONIC KIDNEY DISEASE (HCC): Status: RESOLVED | Noted: 2018-07-29 | Resolved: 2019-08-17

## 2019-08-17 PROBLEM — N18.9 ACUTE KIDNEY INJURY SUPERIMPOSED ON CHRONIC KIDNEY DISEASE (HCC): Status: ACTIVE | Noted: 2018-07-29

## 2019-08-17 LAB
ALBUMIN SERPL-MCNC: 3.9 G/DL (ref 3.5–5.2)
ALBUMIN/GLOB SERPL: 1.1 G/DL
ALP SERPL-CCNC: 168 U/L (ref 39–117)
ALT SERPL W P-5'-P-CCNC: 17 U/L (ref 1–41)
ANION GAP SERPL CALCULATED.3IONS-SCNC: 18 MMOL/L (ref 5–15)
AST SERPL-CCNC: 35 U/L (ref 1–40)
BACTERIA UR QL AUTO: ABNORMAL /HPF
BASOPHILS # BLD AUTO: 0.11 10*3/MM3 (ref 0–0.2)
BASOPHILS NFR BLD AUTO: 0.7 % (ref 0–1.5)
BILIRUB SERPL-MCNC: 0.4 MG/DL (ref 0.2–1.2)
BILIRUB UR QL STRIP: NEGATIVE
BUN BLD-MCNC: 35 MG/DL (ref 6–20)
BUN/CREAT SERPL: 18.3 (ref 7–25)
CALCIUM SPEC-SCNC: 9 MG/DL (ref 8.6–10.5)
CHLORIDE SERPL-SCNC: 90 MMOL/L (ref 98–107)
CLARITY UR: ABNORMAL
CO2 SERPL-SCNC: 29 MMOL/L (ref 22–29)
COLOR UR: YELLOW
CREAT BLD-MCNC: 1.91 MG/DL (ref 0.76–1.27)
D-LACTATE SERPL-SCNC: 1.6 MMOL/L (ref 0.5–2)
D-LACTATE SERPL-SCNC: 3.7 MMOL/L (ref 0.5–2)
DEPRECATED RDW RBC AUTO: 46.4 FL (ref 37–54)
EOSINOPHIL # BLD AUTO: 0.08 10*3/MM3 (ref 0–0.4)
EOSINOPHIL NFR BLD AUTO: 0.5 % (ref 0.3–6.2)
ERYTHROCYTE [DISTWIDTH] IN BLOOD BY AUTOMATED COUNT: 15.1 % (ref 12.3–15.4)
GFR SERPL CREATININE-BSD FRML MDRD: 37 ML/MIN/1.73
GLOBULIN UR ELPH-MCNC: 3.6 GM/DL
GLUCOSE BLD-MCNC: 296 MG/DL (ref 65–99)
GLUCOSE BLDC GLUCOMTR-MCNC: 223 MG/DL (ref 70–130)
GLUCOSE UR STRIP-MCNC: ABNORMAL MG/DL
HCT VFR BLD AUTO: 42.9 % (ref 37.5–51)
HGB BLD-MCNC: 14 G/DL (ref 13–17.7)
HGB UR QL STRIP.AUTO: ABNORMAL
HOLD SPECIMEN: NORMAL
HYALINE CASTS UR QL AUTO: ABNORMAL /LPF
IMM GRANULOCYTES # BLD AUTO: 0.08 10*3/MM3 (ref 0–0.05)
IMM GRANULOCYTES NFR BLD AUTO: 0.5 % (ref 0–0.5)
KETONES UR QL STRIP: NEGATIVE
LEUKOCYTE ESTERASE UR QL STRIP.AUTO: ABNORMAL
LIPASE SERPL-CCNC: 29 U/L (ref 13–60)
LYMPHOCYTES # BLD AUTO: 1.94 10*3/MM3 (ref 0.7–3.1)
LYMPHOCYTES NFR BLD AUTO: 11.9 % (ref 19.6–45.3)
MCH RBC QN AUTO: 27.6 PG (ref 26.6–33)
MCHC RBC AUTO-ENTMCNC: 32.6 G/DL (ref 31.5–35.7)
MCV RBC AUTO: 84.4 FL (ref 79–97)
MONOCYTES # BLD AUTO: 1.02 10*3/MM3 (ref 0.1–0.9)
MONOCYTES NFR BLD AUTO: 6.3 % (ref 5–12)
NEUTROPHILS # BLD AUTO: 13.05 10*3/MM3 (ref 1.7–7)
NEUTROPHILS NFR BLD AUTO: 80.1 % (ref 42.7–76)
NITRITE UR QL STRIP: NEGATIVE
NRBC BLD AUTO-RTO: 0 /100 WBC (ref 0–0.2)
PH UR STRIP.AUTO: 5.5 [PH] (ref 5–8)
PLATELET # BLD AUTO: 305 10*3/MM3 (ref 140–450)
PMV BLD AUTO: 11.7 FL (ref 6–12)
POTASSIUM BLD-SCNC: 4.5 MMOL/L (ref 3.5–5.2)
PROT SERPL-MCNC: 7.5 G/DL (ref 6–8.5)
PROT UR QL STRIP: ABNORMAL
RBC # BLD AUTO: 5.08 10*6/MM3 (ref 4.14–5.8)
RBC # UR: ABNORMAL /HPF
REF LAB TEST METHOD: ABNORMAL
SODIUM BLD-SCNC: 137 MMOL/L (ref 136–145)
SP GR UR STRIP: 1.01 (ref 1–1.03)
SQUAMOUS #/AREA URNS HPF: ABNORMAL /HPF
UROBILINOGEN UR QL STRIP: ABNORMAL
WBC CASTS #/AREA URNS LPF: ABNORMAL /LPF
WBC NRBC COR # BLD: 16.28 10*3/MM3 (ref 3.4–10.8)
WBC UR QL AUTO: ABNORMAL /HPF
WHOLE BLOOD HOLD SPECIMEN: NORMAL
WHOLE BLOOD HOLD SPECIMEN: NORMAL

## 2019-08-17 PROCEDURE — 99284 EMERGENCY DEPT VISIT MOD MDM: CPT

## 2019-08-17 PROCEDURE — P9612 CATHETERIZE FOR URINE SPEC: HCPCS

## 2019-08-17 PROCEDURE — 25010000002 METOCLOPRAMIDE PER 10 MG: Performed by: NURSE PRACTITIONER

## 2019-08-17 PROCEDURE — 87040 BLOOD CULTURE FOR BACTERIA: CPT | Performed by: EMERGENCY MEDICINE

## 2019-08-17 PROCEDURE — 63710000001 INSULIN DETEMIR PER 5 UNITS: Performed by: NURSE PRACTITIONER

## 2019-08-17 PROCEDURE — 25010000002 ONDANSETRON PER 1 MG: Performed by: NURSE PRACTITIONER

## 2019-08-17 PROCEDURE — 83690 ASSAY OF LIPASE: CPT | Performed by: EMERGENCY MEDICINE

## 2019-08-17 PROCEDURE — 25810000003 SODIUM CHLORIDE 0.9 % WITH KCL 20 MEQ 20-0.9 MEQ/L-% SOLUTION: Performed by: NURSE PRACTITIONER

## 2019-08-17 PROCEDURE — 71045 X-RAY EXAM CHEST 1 VIEW: CPT

## 2019-08-17 PROCEDURE — 80053 COMPREHEN METABOLIC PANEL: CPT | Performed by: EMERGENCY MEDICINE

## 2019-08-17 PROCEDURE — 93005 ELECTROCARDIOGRAM TRACING: CPT | Performed by: EMERGENCY MEDICINE

## 2019-08-17 PROCEDURE — 25010000002 HEPARIN (PORCINE) PER 1000 UNITS: Performed by: NURSE PRACTITIONER

## 2019-08-17 PROCEDURE — 25010000002 PIPERACILLIN SOD-TAZOBACTAM PER 1 G: Performed by: EMERGENCY MEDICINE

## 2019-08-17 PROCEDURE — 82962 GLUCOSE BLOOD TEST: CPT

## 2019-08-17 PROCEDURE — 85025 COMPLETE CBC W/AUTO DIFF WBC: CPT | Performed by: EMERGENCY MEDICINE

## 2019-08-17 PROCEDURE — 99222 1ST HOSP IP/OBS MODERATE 55: CPT | Performed by: NURSE PRACTITIONER

## 2019-08-17 PROCEDURE — 74176 CT ABD & PELVIS W/O CONTRAST: CPT

## 2019-08-17 PROCEDURE — 63710000001 INSULIN LISPRO (HUMAN) PER 5 UNITS: Performed by: NURSE PRACTITIONER

## 2019-08-17 PROCEDURE — 87086 URINE CULTURE/COLONY COUNT: CPT | Performed by: NURSE PRACTITIONER

## 2019-08-17 PROCEDURE — 81001 URINALYSIS AUTO W/SCOPE: CPT | Performed by: EMERGENCY MEDICINE

## 2019-08-17 PROCEDURE — 51798 US URINE CAPACITY MEASURE: CPT

## 2019-08-17 PROCEDURE — 83605 ASSAY OF LACTIC ACID: CPT | Performed by: EMERGENCY MEDICINE

## 2019-08-17 RX ORDER — HEPARIN SODIUM 5000 [USP'U]/ML
5000 INJECTION, SOLUTION INTRAVENOUS; SUBCUTANEOUS EVERY 8 HOURS SCHEDULED
Status: DISCONTINUED | OUTPATIENT
Start: 2019-08-17 | End: 2019-08-18

## 2019-08-17 RX ORDER — TERAZOSIN 5 MG/1
5 CAPSULE ORAL NIGHTLY
Status: DISCONTINUED | OUTPATIENT
Start: 2019-08-17 | End: 2019-08-23 | Stop reason: HOSPADM

## 2019-08-17 RX ORDER — SACCHAROMYCES BOULARDII 250 MG
250 CAPSULE ORAL 2 TIMES DAILY
Status: DISCONTINUED | OUTPATIENT
Start: 2019-08-17 | End: 2019-08-23 | Stop reason: HOSPADM

## 2019-08-17 RX ORDER — METOCLOPRAMIDE HYDROCHLORIDE 5 MG/ML
5 INJECTION INTRAMUSCULAR; INTRAVENOUS
Status: DISCONTINUED | OUTPATIENT
Start: 2019-08-17 | End: 2019-08-19

## 2019-08-17 RX ORDER — SODIUM CHLORIDE AND POTASSIUM CHLORIDE 150; 900 MG/100ML; MG/100ML
75 INJECTION, SOLUTION INTRAVENOUS CONTINUOUS
Status: DISCONTINUED | OUTPATIENT
Start: 2019-08-17 | End: 2019-08-19

## 2019-08-17 RX ORDER — TAMSULOSIN HYDROCHLORIDE 0.4 MG/1
0.8 CAPSULE ORAL NIGHTLY
Status: DISCONTINUED | OUTPATIENT
Start: 2019-08-17 | End: 2019-08-23 | Stop reason: HOSPADM

## 2019-08-17 RX ORDER — DEXTROSE MONOHYDRATE 25 G/50ML
25 INJECTION, SOLUTION INTRAVENOUS
Status: DISCONTINUED | OUTPATIENT
Start: 2019-08-17 | End: 2019-08-23 | Stop reason: HOSPADM

## 2019-08-17 RX ORDER — HYDRALAZINE HYDROCHLORIDE 50 MG/1
100 TABLET, FILM COATED ORAL EVERY 8 HOURS SCHEDULED
Status: DISCONTINUED | OUTPATIENT
Start: 2019-08-17 | End: 2019-08-23 | Stop reason: HOSPADM

## 2019-08-17 RX ORDER — BISACODYL 5 MG/1
5 TABLET, DELAYED RELEASE ORAL DAILY PRN
Status: DISCONTINUED | OUTPATIENT
Start: 2019-08-17 | End: 2019-08-23 | Stop reason: HOSPADM

## 2019-08-17 RX ORDER — HYDROCODONE BITARTRATE AND ACETAMINOPHEN 10; 325 MG/1; MG/1
1 TABLET ORAL 3 TIMES DAILY PRN
Status: DISCONTINUED | OUTPATIENT
Start: 2019-08-17 | End: 2019-08-23 | Stop reason: HOSPADM

## 2019-08-17 RX ORDER — DOCUSATE SODIUM 100 MG/1
100 CAPSULE, LIQUID FILLED ORAL 2 TIMES DAILY
Status: DISCONTINUED | OUTPATIENT
Start: 2019-08-17 | End: 2019-08-23 | Stop reason: HOSPADM

## 2019-08-17 RX ORDER — FAMOTIDINE 10 MG/ML
20 INJECTION, SOLUTION INTRAVENOUS EVERY 12 HOURS SCHEDULED
Status: DISCONTINUED | OUTPATIENT
Start: 2019-08-17 | End: 2019-08-23 | Stop reason: ALTCHOICE

## 2019-08-17 RX ORDER — NICOTINE POLACRILEX 4 MG
15 LOZENGE BUCCAL
Status: DISCONTINUED | OUTPATIENT
Start: 2019-08-17 | End: 2019-08-23 | Stop reason: HOSPADM

## 2019-08-17 RX ORDER — ACETAMINOPHEN 650 MG/1
650 SUPPOSITORY RECTAL EVERY 4 HOURS PRN
Status: DISCONTINUED | OUTPATIENT
Start: 2019-08-17 | End: 2019-08-23 | Stop reason: HOSPADM

## 2019-08-17 RX ORDER — ACETAMINOPHEN 160 MG/5ML
650 SOLUTION ORAL EVERY 4 HOURS PRN
Status: DISCONTINUED | OUTPATIENT
Start: 2019-08-17 | End: 2019-08-23 | Stop reason: HOSPADM

## 2019-08-17 RX ORDER — AMLODIPINE BESYLATE 10 MG/1
10 TABLET ORAL
Status: DISCONTINUED | OUTPATIENT
Start: 2019-08-17 | End: 2019-08-23 | Stop reason: HOSPADM

## 2019-08-17 RX ORDER — SODIUM CHLORIDE 0.9 % (FLUSH) 0.9 %
3 SYRINGE (ML) INJECTION EVERY 12 HOURS SCHEDULED
Status: DISCONTINUED | OUTPATIENT
Start: 2019-08-17 | End: 2019-08-23 | Stop reason: HOSPADM

## 2019-08-17 RX ORDER — ONDANSETRON 2 MG/ML
4 INJECTION INTRAMUSCULAR; INTRAVENOUS EVERY 6 HOURS PRN
Status: DISCONTINUED | OUTPATIENT
Start: 2019-08-17 | End: 2019-08-23 | Stop reason: HOSPADM

## 2019-08-17 RX ORDER — ACETAMINOPHEN 325 MG/1
650 TABLET ORAL EVERY 4 HOURS PRN
Status: DISCONTINUED | OUTPATIENT
Start: 2019-08-17 | End: 2019-08-23 | Stop reason: HOSPADM

## 2019-08-17 RX ORDER — METOPROLOL TARTRATE 50 MG/1
100 TABLET, FILM COATED ORAL EVERY 12 HOURS SCHEDULED
Status: DISCONTINUED | OUTPATIENT
Start: 2019-08-17 | End: 2019-08-23 | Stop reason: HOSPADM

## 2019-08-17 RX ORDER — DULOXETIN HYDROCHLORIDE 30 MG/1
30 CAPSULE, DELAYED RELEASE ORAL DAILY
Status: DISCONTINUED | OUTPATIENT
Start: 2019-08-17 | End: 2019-08-23 | Stop reason: HOSPADM

## 2019-08-17 RX ORDER — SODIUM CHLORIDE 0.9 % (FLUSH) 0.9 %
10 SYRINGE (ML) INJECTION AS NEEDED
Status: DISCONTINUED | OUTPATIENT
Start: 2019-08-17 | End: 2019-08-23 | Stop reason: HOSPADM

## 2019-08-17 RX ORDER — SODIUM CHLORIDE 0.9 % (FLUSH) 0.9 %
3-10 SYRINGE (ML) INJECTION AS NEEDED
Status: DISCONTINUED | OUTPATIENT
Start: 2019-08-17 | End: 2019-08-23 | Stop reason: HOSPADM

## 2019-08-17 RX ORDER — SEVELAMER CARBONATE FOR ORAL SUSPENSION 800 MG/1
800 POWDER, FOR SUSPENSION ORAL
Status: DISCONTINUED | OUTPATIENT
Start: 2019-08-17 | End: 2019-08-23 | Stop reason: HOSPADM

## 2019-08-17 RX ADMIN — HYDRALAZINE HYDROCHLORIDE 100 MG: 50 TABLET, FILM COATED ORAL at 20:46

## 2019-08-17 RX ADMIN — HYDROCODONE BITARTRATE AND ACETAMINOPHEN 1 TABLET: 10; 325 TABLET ORAL at 20:07

## 2019-08-17 RX ADMIN — ONDANSETRON 4 MG: 2 INJECTION INTRAMUSCULAR; INTRAVENOUS at 20:09

## 2019-08-17 RX ADMIN — SODIUM CHLORIDE 1000 ML: 9 INJECTION, SOLUTION INTRAVENOUS at 17:30

## 2019-08-17 RX ADMIN — SODIUM CHLORIDE, PRESERVATIVE FREE 3 ML: 5 INJECTION INTRAVENOUS at 20:08

## 2019-08-17 RX ADMIN — FAMOTIDINE 20 MG: 10 INJECTION, SOLUTION INTRAVENOUS at 20:09

## 2019-08-17 RX ADMIN — TERAZOSIN HYDROCHLORIDE ANHYDROUS 5 MG: 5 CAPSULE ORAL at 20:08

## 2019-08-17 RX ADMIN — INSULIN DETEMIR 15 UNITS: 100 INJECTION, SOLUTION SUBCUTANEOUS at 22:57

## 2019-08-17 RX ADMIN — Medication 250 MG: at 20:08

## 2019-08-17 RX ADMIN — HEPARIN SODIUM 5000 UNITS: 5000 INJECTION INTRAVENOUS; SUBCUTANEOUS at 20:46

## 2019-08-17 RX ADMIN — TAMSULOSIN HYDROCHLORIDE 0.8 MG: 0.4 CAPSULE ORAL at 20:07

## 2019-08-17 RX ADMIN — METOCLOPRAMIDE 5 MG: 5 INJECTION, SOLUTION INTRAMUSCULAR; INTRAVENOUS at 20:08

## 2019-08-17 RX ADMIN — AMLODIPINE BESYLATE 10 MG: 10 TABLET ORAL at 20:08

## 2019-08-17 RX ADMIN — INSULIN LISPRO 3 UNITS: 100 INJECTION, SOLUTION INTRAVENOUS; SUBCUTANEOUS at 20:50

## 2019-08-17 RX ADMIN — METOPROLOL TARTRATE 100 MG: 50 TABLET ORAL at 20:08

## 2019-08-17 RX ADMIN — DOCUSATE SODIUM 100 MG: 100 CAPSULE, LIQUID FILLED ORAL at 20:08

## 2019-08-17 RX ADMIN — SEVELAMER CARBONATE 0.8 G: 0.8 POWDER, FOR SUSPENSION ORAL at 20:08

## 2019-08-17 RX ADMIN — TAZOBACTAM SODIUM AND PIPERACILLIN SODIUM 3.38 G: 375; 3 INJECTION, SOLUTION INTRAVENOUS at 17:57

## 2019-08-17 RX ADMIN — POTASSIUM CHLORIDE AND SODIUM CHLORIDE 75 ML/HR: 900; 150 INJECTION, SOLUTION INTRAVENOUS at 20:10

## 2019-08-17 RX ADMIN — DULOXETINE HYDROCHLORIDE 30 MG: 30 CAPSULE, DELAYED RELEASE ORAL at 20:07

## 2019-08-17 RX ADMIN — LIDOCAINE HYDROCHLORIDE 1 ML: 20 JELLY TOPICAL at 17:29

## 2019-08-17 NOTE — H&P
[facility] Medicine Services  HISTORY AND PHYSICAL    Patient Name: Kendrick Crystal  : 1970  MRN: [id number]  Primary Care Physician: Keyanna Leone APRN  Date of admission: 2019      Subjective   Subjective     Chief Complaint:  Intractable nausea and vomiting    HPI:  Kendrick Crystal is a 48 y.o. male with uncontrolled 2 diabetes recently admitted from 2019 until 2019 with sepsis, perirectal rectal abscess as well as rectal fistula that underwent her operative drainage on 2019 with Dr. Mensah.  Cultures returned positive for E. coli and Klebsiella and was treated with IV antibiotics per Dr. Carolina.  He was discharged on 2019 with oral Ceftin and Flagyl.  Upon returning home patient developed extreme nausea and vomiting within hours.  Patient call primary care provider who prescribed him Phenergan which did help to a degree.  However once medication wore off patient continued to have nausea and vomiting.  States he has had very little intake and unable to take antibiotics or keep down other regularly scheduled medications.  He denies fever, increasing abdominal pain, diarrhea.  He does endorse difficulty urinating, lightheadedness/intermittent dizziness.    Work-up in ED reveals increasing white count, lactate 3.7, creatinine 1.91 which is down from 2.58 on 2019.  Currently CT abdomen and pelvis is pending as well as chest x-ray.  UA with large leukocytes and too numerous to count WBCs/bacteria.  Patient to be admitted for further management.    Review of Systems   Constitutional: Positive for activity change, appetite change and fatigue. Negative for fever.   HENT: Negative.    Respiratory: Negative.    Cardiovascular: Negative.    Gastrointestinal: Positive for nausea and vomiting. Negative for abdominal pain and diarrhea.   Genitourinary: Positive for difficulty urinating, dysuria and frequency.   Musculoskeletal: Negative.    Skin: Negative.     Neurological: Positive for weakness and light-headedness.   Psychiatric/Behavioral: Negative.           All other systems reviewed and are negative.     Personal History     Past Medical History:   Diagnosis Date   • Abdominal wall cellulitis 5/20/2019   • JUAN (acute kidney injury) (CMS/HCC) 7/29/2018   • Arthritis    • Bipolar 1 disorder (CMS/HCC)    • Cellulitis of right anterior lower leg 7/29/2018   • Cirrhosis (CMS/HCC)    • Counseling for insulin pump    • Depression    • Diabetes mellitus (CMS/HCC)    • Encephalopathy, hepatic (CMS/HCC)    • H/O degenerative disc disease    • History of Lissa's gangrene     right leg/testicle   • Hyperlipemia    • Hypertension    • multiple Skin abscesses    • DUNLAP, bx showed stage IV fibrosis    • Neuropathy    • Peripheral vascular disease (CMS/HCC)    • Thrombophlebitis        Past Surgical History:   Procedure Laterality Date   • ABDOMINAL WALL ABSCESS INCISION AND DRAINAGE N/A 4/26/2019    Procedure: ABDOMINAL WALL DEBRIDEMENT;  Surgeon: Fadi Kern MD;  Location:  [facility];  Service: General   • AMPUTATION DIGIT Left 1/30/2017    Procedure: left fourth and fifth transmetatarsal toe amputation ;  Surgeon: Juancho Martinez MD;  Location:  [facility];  Service:    • BELOW KNEE AMPUTATION Left 3/2/2017    Procedure: AMPUTATION BELOW KNEE, SHMUEL;  Surgeon: Juancho Martinez MD;  Location:  [facility];  Service:    • ENDOSCOPY     • HEMORRHOIDECTOMY N/A 8/2/2019    Procedure: EXCISION AND DRAIN PERIRECTAL ABSCESS;  Surgeon: Mumtaz Payne MD;  Location:  [facility];  Service: General   • LEG SURGERY     • TESTICLE SURGERY Right     DEBRIDEMENT FROM GANGRENE   • TONSILLECTOMY  1975       Family History: family history includes Arthritis in his father and mother; Diabetes in his brother and father; Heart attack in his father; Hyperlipidemia in his father; Hypertension in his brother, father, and mother; Kidney disease in his mother; Mental illness in his sister; Obesity  in his brother; Stroke in his mother. Otherwise pertinent FHx was reviewed and unremarkable.     Social History:  reports that he has never smoked. He has never used smokeless tobacco. He reports that he does not drink alcohol or use drugs.  Social History     Social History Narrative    Lives in [facility]       Medications:    Available home medication information reviewed.    (Not in a hospital admission)    No Known Allergies    Objective   Objective     Vital Signs:   Temp:  [97.9 °F (36.6 °C)] 97.9 °F (36.6 °C)  Heart Rate:  [] 86  Resp:  [16] 16  BP: (125-187)/() 187/107        Physical Exam   Constitutional: Awake, alert, appears ill  Eyes: PERRLA, sclerae anicteric, no conjunctival injection  HENT: NCAT, mucous membranes dry  Neck: Supple, no thyromegaly, no lymphadenopathy, trachea midline  Respiratory: Clear to auscultation bilaterally, nonlabored respirations   Cardiovascular: RRR, no murmurs, rubs, or gallops, palpable pedal pulses bilaterally  Gastrointestinal: Positive bowel sounds, soft, mild tenderness left side mid abdomen, nondistended  Musculoskeletal: Left BKA, no edema noted to right lower extremity, no clubbing or cyanosis to extremities  Psychiatric: Appropriate affect, cooperative  Neurologic: Oriented x 3, strength symmetric in all extremities, Cranial Nerves grossly intact to confrontation, speech clear  Skin: No rashes    Results Reviewed:  I have personally reviewed current lab and radiology data.    Results from last 7 days   Lab Units 08/17/19  1625   WBC 10*3/mm3 16.28*   HEMOGLOBIN g/dL 14.0   HEMATOCRIT % 42.9   PLATELETS 10*3/mm3 305     Results from last 7 days   Lab Units 08/17/19  1625   SODIUM mmol/L 137   POTASSIUM mmol/L 4.5   CHLORIDE mmol/L 90*   CO2 mmol/L 29.0   BUN mg/dL 35*   CREATININE mg/dL 1.91*   GLUCOSE mg/dL 296*   CALCIUM mg/dL 9.0   ALT (SGPT) U/L 17   AST (SGOT) U/L 35   LACTATE mmol/L 3.7*     Estimated Creatinine Clearance: 61.7 mL/min (A) (by  C-G formula based on SCr of 1.91 mg/dL (H)).  Brief Urine Lab Results  (Last result in the past 365 days)      Color   Clarity   Blood   Leuk Est   Nitrite   Protein   CREAT   Urine HCG        08/17/19 1729 Yellow Cloudy Trace Large (3+) Negative 30 mg/dL (1+)             Imaging Results (last 24 hours)     Procedure Component Value Units Date/Time    XR Chest 1 View [[id number]] Updated:  08/17/19 1743        Results for orders placed during the hospital encounter of 05/18/19   Adult Transthoracic Echo Complete W/ Cont if Necessary Per Protocol    Narrative · Estimated EF = 60%.  · Mild mitral valve regurgitation is present  · Mild tricuspid valve regurgitation is present.  · Calculated right ventricular systolic pressure from tricuspid   regurgitation is 29 mmHg.          Assessment/Plan   Assessment / Plan     Active Hospital Problems    Diagnosis POA   • **Intractable nausea and vomiting [R11.2] Unknown   • Lactic acidosis [E87.2] Unknown   • Perirectal abscess I&D 8/2/19 [K61.1] Unknown   • Dehydration [E86.0] Unknown   • Gastroparesis [K31.84] Unknown   • SIRS (systemic inflammatory response syndrome) (CMS/HCC) [R65.10] Unknown   • Obesity (BMI 30-39.9) [E66.9] Yes   • DM (diabetes mellitus) type II uncontrolled, periph vascular disorder (CMS/HCC) [E11.51, E11.65] Yes   • S/P BKA (below knee amputation), left (CMS/HCC) [Z89.512] Not Applicable   • Acute kidney injury (CMS/HCC) [N17.9] Unknown   • Essential hypertension [I10] Yes   • DUNLAP Cirrhosis [K74.60] Yes         --Intractable nausea vomiting with dehydration and lactic acidosis-likely related to dehydration.  IV fluid bolus in ED and continue maintenance IV fluids.  --Recent wound culture with Klebsiella and E. coli.  Start Zosyn IV antibiotic therapy.  Consult Dr. Dong, who followed during recent admission.  --Reimage abdomen with CT abdomen and pelvis, secondary to intractable nausea and vomiting and increasing leukocytosis.  Will start noncontrasted  as creatinine still elevated.  Ask Dr. Leiva/brown to see in the morning  --Follow creatinine, continue IV fluid hydration.  Currently creatinine significantly improved from previous admission and trending downward from discharge date on 8/14/2019.  Appears baseline creatinine~ 0.8  --Continue antiemetics as needed, scheduled Reglan for history of gastroparesis  --Clear liquid diet for now  --Reduce scheduled home basal insulin and low-dose sliding scale  --PT/OT  --A.m. labs    DVT prophylaxis:  Heparin SQ    CODE STATUS:    Code Status and Medical Interventions:   Ordered at: 08/17/19 7801     Level Of Support Discussed With:    Patient     Code Status:    CPR     Medical Interventions (Level of Support Prior to Arrest):    Full       Admission Status:  I believe this patient meets INPATIENT status due to the need for care which can only be reasonably provided in an hospital setting due to his need for IV antibiotics as well as to be evaluated by multiple subspecialties with his chronic medical issues..  As such, I feel patient’s risk for adverse outcomes and need for care warrant INPATIENT evaluation and predict the patient’s care encounter to likely last beyond 2 midnights.        Electronically signed by CHINTAN Gannon, 08/17/19, 6:22 PM.          I have seen and evaluate the patient.  I performed into the history and physical exam mention.  I agree with the above assessment plan.    Alexander Flowers, DO  9:35 PM  8/17/19

## 2019-08-17 NOTE — ED PROVIDER NOTES
Subjective   Kendrick Crystal is a 48 y.o. male presenting to the emergency department complaining of generalized weakness. Of note, he was hospitalized 07/30/19-8/14/19 for a perirectal abscess. His wife reports that he felt okay upon discharge but became nauseated and started vomiting later that evening. His wife reports that his appetite has become increasingly decreased over the past 3 days, eating only 1 cracker, 2 popsicles, and 4 ounces of chicken broth. His wife reports that he has been drinking a small amount of sugar-free Gatorade and that his urine output has decreased and become more concentrated and malodorous. He notes associated nausea, but denies any fever or abdominal pain. She notes that he has had 1 bowel movement over the past 3 days and has noticed gradually worsening disorientation. He is supposed to be on a course of antibiotics but has not been able to take any doses in the last 3 days. She reports that she called his primary care provider, Keyanna Leone, 2 days ago who prescribed Phenergan, which offered only temporary relief. His last dose was last night. His medical records indicate a history of Type 2 diabetes mellitus, essential hypertension, hyperlipidemia, and DUNLAP cirrhosis. 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 There are no other acute complaints at this time.        History provided by:  Spouse  Weakness - Generalized   Severity:  Severe  Onset quality:  Gradual  Duration:  3 days  Timing:  Constant  Progression:  Worsening  Chronicity:  New  Context comment:  Recent hospitalization from 7/30/19 to 8/14/19  Relieved by:  Nothing  Worsened by:  Nothing  Ineffective treatments: Phenergan.  Associated symptoms: nausea and vomiting    Associated symptoms: no abdominal pain and no fever    Risk factors: diabetes        Review of Systems   Constitutional: Positive for appetite change. Negative for fever.   Gastrointestinal: Positive  for nausea and vomiting. Negative for abdominal pain.   Neurological: Positive for weakness (generalized).   All other systems reviewed and are negative.      Past Medical History:   Diagnosis Date   • Abdominal wall cellulitis 5/20/2019   • JUAN (acute kidney injury) (CMS/HCC) 7/29/2018   • Arthritis    • Bipolar 1 disorder (CMS/HCC)    • Cellulitis of right anterior lower leg 7/29/2018   • Cirrhosis (CMS/HCC)    • Counseling for insulin pump    • Depression    • Diabetes mellitus (CMS/HCC)    • Encephalopathy, hepatic (CMS/HCC)    • H/O degenerative disc disease    • History of Lissa's gangrene     right leg/testicle   • Hyperlipemia    • Hypertension    • multiple Skin abscesses    • DUNLAP, bx showed stage IV fibrosis    • Neuropathy    • Peripheral vascular disease (CMS/HCC)    • Thrombophlebitis        No Known Allergies    Past Surgical History:   Procedure Laterality Date   • ABDOMINAL WALL ABSCESS INCISION AND DRAINAGE N/A 4/26/2019    Procedure: ABDOMINAL WALL DEBRIDEMENT;  Surgeon: Fadi Kern MD;  Location:  [facility];  Service: General   • AMPUTATION DIGIT Left 1/30/2017    Procedure: left fourth and fifth transmetatarsal toe amputation ;  Surgeon: Juancho Martinez MD;  Location:  [facility];  Service:    • BELOW KNEE AMPUTATION Left 3/2/2017    Procedure: AMPUTATION BELOW KNEE, SHMUEL;  Surgeon: Juancho Martinez MD;  Location:  [facility];  Service:    • ENDOSCOPY     • HEMORRHOIDECTOMY N/A 8/2/2019    Procedure: EXCISION AND DRAIN PERIRECTAL ABSCESS;  Surgeon: Mumtaz Payne MD;  Location:  [facility];  Service: General   • LEG SURGERY     • TESTICLE SURGERY Right     DEBRIDEMENT FROM GANGRENE   • TONSILLECTOMY  1975       Family History   Problem Relation Age of Onset   • Arthritis Mother    • Hypertension Mother    • Kidney disease Mother    • Stroke Mother    • Heart attack Father    • Arthritis Father    • Diabetes Father    • Hyperlipidemia Father    • Hypertension Father    • Mental illness  Sister    • Diabetes Brother    • Hypertension Brother    • Obesity Brother        Social History     Socioeconomic History   • Marital status:      Spouse name: Not on file   • Number of children: 1   • Years of education: Not on file   • Highest education level: Not on file   Occupational History   • Occupation: Security     Comment: Disabled-Diabetic Retinopathy   Tobacco Use   • Smoking status: Never Smoker   • Smokeless tobacco: Never Used   Substance and Sexual Activity   • Alcohol use: No   • Drug use: No   • Sexual activity: Defer   Social History Narrative    L[facility] in [facility]         Objective   Physical Exam   Constitutional: He appears well-developed and well-nourished.   Flat affect. Appears anxious and depressed. Keeps eyes closed throughout the majority of my evaluation.   HENT:   Head: Normocephalic and atraumatic.   Very dry mucous membranes.   Eyes: Conjunctivae and EOM are normal. Pupils are equal, round, and reactive to light. No scleral icterus.   PERRL at 3 mm.   Neck: Normal range of motion. Neck supple.   Cardiovascular: Normal rate, regular rhythm and normal heart sounds.   No murmur heard.  Pulmonary/Chest: Effort normal and breath sounds normal. No respiratory distress.   Abdominal: Bowel sounds are normal. There is no tenderness.   Abdomen is protuberant with adipose but non-tender.   Musculoskeletal: Normal range of motion.   Skin: Skin is warm and dry.   Psychiatric: His behavior is normal. His mood appears anxious. He exhibits a depressed mood.   Flat affect.   Nursing note and vitals reviewed.      Procedures         ED Course  ED Course as of Aug 17 1947   Sat Aug 17, 2019   1732 Date of Admission: 7/30/2019  Date of Discharge:  8/14/2019 (Wednesday)  Primary Care Physician: Keyanna Leone APRN     Consults     Date and Time Order Name Status Description    8/4/2019 1003 Inpatient Nephrology Consult Completed      7/31/2019 0971 Inpatient Urology  Consult Completed      7/31/2019 0844 Inpatient Infectious Diseases Consult Completed      7/31/2019 0030 Inpatient Colorectal Surgery Consult Completed          Jaime Rubio MD - Nephrology  Foster Rodriguez MD - Urology  Usman Dong MD - Infectious Diseases  Mumtaz Payne MD - Colorectal Surgery - procedure in OR - trans-rectal drainage of claudia-rectal abscess     Hospital Course     Presenting Problem:   Sepsis (CMS/[facility]) [A41.9]     Active Hospital Problems    Diagnosis   POA  • DM (diabetes mellitus) type II uncontrolled, periph vascular disorder (CMS/[facility]) [E11.51, E11.65]   Yes  • Peripheral vascular disease (CMS/[facility]) [I73.9]   Yes  • S/P BKA (below knee amputation), left (CMS/[facility]) [Z89.512]   Not Applicable  • JUAN (acute kidney injury) (CMS/[facility]) [N17.9]   Unknown  • History of MRSA infection [Z86.14]   Yes  • Type 2 diabetes mellitus with circulatory disorder and uncontrolled [E11.59]   Yes  • Essential hypertension [I10]   Yes  • Hyperlipemia [E78.5]   Yes  • DUNLAP Cirrhosis [K74.60]   Yes  • Non-compliance [Z91.14]          [MS]   4471 I paged the hospitalist for admission.  [MS]   4400 Discussed the case in detail with Dr. Flowers, who will admit.  [CH]      ED Course User Index  [CH] Gloria Murphy  [MS] Foster Beebe MD     Recent Results (from the past 24 hour(s))   Comprehensive Metabolic Panel    Collection Time: 08/17/19  4:25 PM   Result Value Ref Range    Glucose 296 (H) 65 - 99 mg/dL    BUN 35 (H) 6 - 20 mg/dL    Creatinine 1.91 (H) 0.76 - 1.27 mg/dL    Sodium 137 136 - 145 mmol/L    Potassium 4.5 3.5 - 5.2 mmol/L    Chloride 90 (L) 98 - 107 mmol/L    CO2 29.0 22.0 - 29.0 mmol/L    Calcium 9.0 8.6 - 10.5 mg/dL    Total Protein 7.5 6.0 - 8.5 g/dL    Albumin 3.90 3.50 - 5.20 g/dL    ALT (SGPT) 17 1 - 41 U/L    AST (SGOT) 35 1 - 40 U/L    Alkaline Phosphatase 168 (H) 39 - 117 U/L    Total Bilirubin 0.4 0.2 - 1.2 mg/dL    eGFR Non African Amer 37 (L) >60 mL/min/1.73    Globulin 3.6  gm/dL    A/G Ratio 1.1 g/dL    BUN/Creatinine Ratio 18.3 7.0 - 25.0    Anion Gap 18.0 (H) 5.0 - 15.0 mmol/L   Lipase    Collection Time: 08/17/19  4:25 PM   Result Value Ref Range    Lipase 29 13 - 60 U/L   Lactic Acid, Plasma    Collection Time: 08/17/19  4:25 PM   Result Value Ref Range    Lactate 3.7 (C) 0.5 - 2.0 mmol/L   Light Blue Top    Collection Time: 08/17/19  4:25 PM   Result Value Ref Range    Extra Tube hold for add-on    Green Top (Gel)    Collection Time: 08/17/19  4:25 PM   Result Value Ref Range    Extra Tube Hold for add-ons.    Lavender Top    Collection Time: 08/17/19  4:25 PM   Result Value Ref Range    Extra Tube hold for add-on    Gold Top - SST    Collection Time: 08/17/19  4:25 PM   Result Value Ref Range    Extra Tube Hold for add-ons.    Green Top (No Gel)    Collection Time: 08/17/19  4:25 PM   Result Value Ref Range    Extra Tube Hold for add-ons.    CBC Auto Differential    Collection Time: 08/17/19  4:25 PM   Result Value Ref Range    WBC 16.28 (H) 3.40 - 10.80 10*3/mm3    RBC 5.08 4.14 - 5.80 10*6/mm3    Hemoglobin 14.0 13.0 - 17.7 g/dL    Hematocrit 42.9 37.5 - 51.0 %    MCV 84.4 79.0 - 97.0 fL    MCH 27.6 26.6 - 33.0 pg    MCHC 32.6 31.5 - 35.7 g/dL    RDW 15.1 12.3 - 15.4 %    RDW-SD 46.4 37.0 - 54.0 fl    MPV 11.7 6.0 - 12.0 fL    Platelets 305 140 - 450 10*3/mm3    Neutrophil % 80.1 (H) 42.7 - 76.0 %    Lymphocyte % 11.9 (L) 19.6 - 45.3 %    Monocyte % 6.3 5.0 - 12.0 %    Eosinophil % 0.5 0.3 - 6.2 %    Basophil % 0.7 0.0 - 1.5 %    Immature Grans % 0.5 0.0 - 0.5 %    Neutrophils, Absolute 13.05 (H) 1.70 - 7.00 10*3/mm3    Lymphocytes, Absolute 1.94 0.70 - 3.10 10*3/mm3    Monocytes, Absolute 1.02 (H) 0.10 - 0.90 10*3/mm3    Eosinophils, Absolute 0.08 0.00 - 0.40 10*3/mm3    Basophils, Absolute 0.11 0.00 - 0.20 10*3/mm3    Immature Grans, Absolute 0.08 (H) 0.00 - 0.05 10*3/mm3    nRBC 0.0 0.0 - 0.2 /100 WBC   Urinalysis With Microscopic If Indicated (No Culture) - Urine, Catheter     Collection Time: 08/17/19  5:29 PM   Result Value Ref Range    Color, UA Yellow Yellow, Straw    Appearance, UA Cloudy (A) Clear    pH, UA 5.5 5.0 - 8.0    Specific Gravity, UA 1.010 1.001 - 1.030    Glucose,  mg/dL (1+) (A) Negative    Ketones, UA Negative Negative    Bilirubin, UA Negative Negative    Blood, UA Trace (A) Negative    Protein, UA 30 mg/dL (1+) (A) Negative    Leuk Esterase, UA Large (3+) (A) Negative    Nitrite, UA Negative Negative    Urobilinogen, UA 0.2 E.U./dL 0.2 - 1.0 E.U./dL   Urinalysis, Microscopic Only - Urine, Catheter    Collection Time: 08/17/19  5:29 PM   Result Value Ref Range    RBC, UA 0-2 None Seen, 0-2 /HPF    WBC, UA Too Numerous to Count (A) None Seen, 0-2 /HPF    Bacteria, UA 3+ (A) None Seen, Trace /HPF    Squamous Epithelial Cells, UA 0-2 None Seen, 0-2 /HPF    Hyaline Casts, UA 0-6 0 - 6 /LPF    WBC Casts 3-6 None Seen /LPF    Methodology Manual Light Microscopy      Note: In addition to lab results from this visit, the labs listed above may include labs taken at another facility or during a different encounter within the last 24 hours. Please correlate lab times with ED admission and discharge times for further clarification of the services performed during this visit.    CT Abdomen Pelvis Without Contrast   Preliminary Result   Previously seen fluid collection identified inferior to the   prostate gland is more increased in density on today's examination.   There is wall thickening again seen throughout the bladder. Findings   again are concerning for cystitis. Cystography can be performed for   further evaluation. Remainder of the CT abdomen and pelvis is stable.       DICTATED:   08/17/2019   EDITED/ls :   08/17/2019               XR Chest 1 View   Preliminary Result   No acute cardiopulmonary disease.       DICTATED:   08/17/2019   EDITED/ls :   08/17/2019                 Vitals:    08/17/19 1659 08/17/19 1700 08/17/19 1800 08/17/19 1849   BP:  125/87 (!)  "187/107 179/97   BP Location:    Left arm   Patient Position:    Lying   Pulse: 89  86    Resp:    16   Temp:    99.9 °F (37.7 °C)   TempSrc:    Oral   SpO2: 97%  99%    Weight:    105 kg (230 lb 9.6 oz)   Height:    190.5 cm (75\")     Medications   sodium chloride 0.9 % flush 10 mL (not administered)   amLODIPine (NORVASC) tablet 10 mg (not administered)   DULoxetine (CYMBALTA) DR capsule 30 mg (not administered)   hydrALAZINE (APRESOLINE) tablet 100 mg (not administered)   HYDROcodone-acetaminophen (NORCO)  MG per tablet 1 tablet (not administered)   insulin detemir (LEVEMIR) injection 15 Units (not administered)   metoprolol tartrate (LOPRESSOR) tablet 100 mg (not administered)   saccharomyces boulardii (FLORASTOR) capsule 250 mg (not administered)   sevelamer (RENVELA) packet 0.8 g (not administered)   tamsulosin (FLOMAX) 24 hr capsule 0.8 mg (not administered)   terazosin (HYTRIN) capsule 5 mg (not administered)   dextrose (GLUTOSE) oral gel 15 g (not administered)   dextrose (D50W) 25 g/ 50mL Intravenous Solution 25 g (not administered)   glucagon (human recombinant) (GLUCAGEN DIAGNOSTIC) injection 1 mg (not administered)   sodium chloride 0.9 % flush 3 mL (not administered)   sodium chloride 0.9 % flush 3-10 mL (not administered)   heparin (porcine) 5000 UNIT/ML injection 5,000 Units (not administered)   sodium chloride 0.9 % with KCl 20 mEq/L infusion (not administered)   acetaminophen (TYLENOL) tablet 650 mg (not administered)     Or   acetaminophen (TYLENOL) 160 MG/5ML solution 650 mg (not administered)     Or   acetaminophen (TYLENOL) suppository 650 mg (not administered)   insulin lispro (humaLOG) injection 0-7 Units (not administered)   ondansetron (ZOFRAN) injection 4 mg (not administered)   famotidine (PEPCID) injection 20 mg (not administered)   metoclopramide (REGLAN) injection 5 mg (not administered)   bisacodyl (DULCOLAX) EC tablet 5 mg (not administered)   docusate sodium (COLACE) capsule " 100 mg (not administered)   piperacillin-tazobactam (ZOSYN) 3.375 g in iso-osmotic dextrose 50 ml (premix) (not administered)   sodium chloride 0.9 % bolus 1,000 mL (1,000 mL Intravenous New Bag 8/17/19 1730)   lidocaine (XYLOCAINE) 2 % jelly (1 mL Topical Given 8/17/19 1729)   piperacillin-tazobactam (ZOSYN) 3.375 g in iso-osmotic dextrose 50 ml (premix) (0 g Intravenous Stopped 8/17/19 1827)     ECG/EMG Results (last 24 hours)     Procedure Component Value Units Date/Time    ECG 12 Lead [[id number]] Collected:  08/17/19 1622     Updated:  08/17/19 1623        ECG 12 Lead   Final Result   Test Reason : WEAKNESS   Blood Pressure : **/** mmHG   Vent. Rate : 102 BPM     Atrial Rate : 102 BPM      P-R Int : 170 ms          QRS Dur : 114 ms       QT Int : 376 ms       P-R-T Axes : 048 090 035 degrees      QTc Int : 490 ms      Sinus tachycardia   Otherwise normal ECG   When compared with ECG of 31-JUL-2019 05:19,   Incomplete left bundle branch block is no longer present   Confirmed by SAUL PAIRSI MD ([id number]) on 8/18/2019 12:49:43 AM      Referred By:  EDMD           Confirmed By:SAUL PARISI MD                          [facility]    Final diagnoses:   Generalized weakness   Leukocytosis, unspecified type   Failure of outpatient treatment       Documentation assistance provided by scribwanda Murphy.  Information recorded by the scribe was done at my direction and has been verified and validated by me.     Gloria Murphy  08/17/19 1959       Saul Parisi MD  08/18/19 0102

## 2019-08-18 ENCOUNTER — READMISSION MANAGEMENT (OUTPATIENT)
Dept: CALL CENTER | Facility: HOSPITAL | Age: 49
End: 2019-08-18

## 2019-08-18 ENCOUNTER — APPOINTMENT (OUTPATIENT)
Dept: CT IMAGING | Facility: HOSPITAL | Age: 49
End: 2019-08-18

## 2019-08-18 PROBLEM — E87.20 LACTIC ACIDOSIS: Status: RESOLVED | Noted: 2019-08-17 | Resolved: 2019-08-18

## 2019-08-18 LAB
BACTERIA UR QL AUTO: ABNORMAL /HPF
BILIRUB UR QL STRIP: NEGATIVE
CLARITY UR: ABNORMAL
COLOR UR: YELLOW
GLUCOSE BLDC GLUCOMTR-MCNC: 185 MG/DL (ref 70–130)
GLUCOSE BLDC GLUCOMTR-MCNC: 218 MG/DL (ref 70–130)
GLUCOSE BLDC GLUCOMTR-MCNC: 235 MG/DL (ref 70–130)
GLUCOSE BLDC GLUCOMTR-MCNC: 263 MG/DL (ref 70–130)
GLUCOSE UR STRIP-MCNC: NEGATIVE MG/DL
HGB UR QL STRIP.AUTO: ABNORMAL
KETONES UR QL STRIP: ABNORMAL
LEUKOCYTE ESTERASE UR QL STRIP.AUTO: ABNORMAL
NITRITE UR QL STRIP: NEGATIVE
PH UR STRIP.AUTO: 5.5 [PH] (ref 5–8)
PROT UR QL STRIP: ABNORMAL
RBC # UR: ABNORMAL /HPF
REF LAB TEST METHOD: ABNORMAL
SP GR UR STRIP: 1.01 (ref 1–1.03)
SQUAMOUS #/AREA URNS HPF: ABNORMAL /HPF
UROBILINOGEN UR QL STRIP: ABNORMAL
WBC UR QL AUTO: ABNORMAL /HPF

## 2019-08-18 PROCEDURE — 87086 URINE CULTURE/COLONY COUNT: CPT | Performed by: NURSE PRACTITIONER

## 2019-08-18 PROCEDURE — 25010000002 PIPERACILLIN SOD-TAZOBACTAM PER 1 G: Performed by: NURSE PRACTITIONER

## 2019-08-18 PROCEDURE — 63710000001 INSULIN LISPRO (HUMAN) PER 5 UNITS: Performed by: NURSE PRACTITIONER

## 2019-08-18 PROCEDURE — 25010000002 HEPARIN (PORCINE) PER 1000 UNITS: Performed by: NURSE PRACTITIONER

## 2019-08-18 PROCEDURE — 25010000002 ONDANSETRON PER 1 MG: Performed by: NURSE PRACTITIONER

## 2019-08-18 PROCEDURE — 25010000002 METOCLOPRAMIDE PER 10 MG: Performed by: NURSE PRACTITIONER

## 2019-08-18 PROCEDURE — 63710000001 INSULIN DETEMIR PER 5 UNITS: Performed by: NURSE PRACTITIONER

## 2019-08-18 PROCEDURE — 25010000002 HEPARIN (PORCINE) PER 1000 UNITS: Performed by: INTERNAL MEDICINE

## 2019-08-18 PROCEDURE — 81001 URINALYSIS AUTO W/SCOPE: CPT | Performed by: NURSE PRACTITIONER

## 2019-08-18 PROCEDURE — 74176 CT ABD & PELVIS W/O CONTRAST: CPT

## 2019-08-18 PROCEDURE — 99232 SBSQ HOSP IP/OBS MODERATE 35: CPT | Performed by: INTERNAL MEDICINE

## 2019-08-18 PROCEDURE — 82962 GLUCOSE BLOOD TEST: CPT

## 2019-08-18 RX ORDER — METOPROLOL TARTRATE 100 MG/1
100 TABLET ORAL EVERY 12 HOURS
Status: ON HOLD | COMMUNITY
End: 2019-11-14 | Stop reason: SDUPTHER

## 2019-08-18 RX ORDER — HEPARIN SODIUM 5000 [USP'U]/ML
5000 INJECTION, SOLUTION INTRAVENOUS; SUBCUTANEOUS EVERY 12 HOURS SCHEDULED
Status: DISCONTINUED | OUTPATIENT
Start: 2019-08-18 | End: 2019-08-23 | Stop reason: HOSPADM

## 2019-08-18 RX ADMIN — TAZOBACTAM SODIUM AND PIPERACILLIN SODIUM 3.38 G: 375; 3 INJECTION, SOLUTION INTRAVENOUS at 00:55

## 2019-08-18 RX ADMIN — METOPROLOL TARTRATE 100 MG: 50 TABLET ORAL at 08:50

## 2019-08-18 RX ADMIN — METOCLOPRAMIDE 5 MG: 5 INJECTION, SOLUTION INTRAMUSCULAR; INTRAVENOUS at 20:29

## 2019-08-18 RX ADMIN — METOCLOPRAMIDE 5 MG: 5 INJECTION, SOLUTION INTRAMUSCULAR; INTRAVENOUS at 08:56

## 2019-08-18 RX ADMIN — HYDRALAZINE HYDROCHLORIDE 100 MG: 50 TABLET, FILM COATED ORAL at 08:50

## 2019-08-18 RX ADMIN — FAMOTIDINE 20 MG: 10 INJECTION, SOLUTION INTRAVENOUS at 21:15

## 2019-08-18 RX ADMIN — HEPARIN SODIUM 5000 UNITS: 5000 INJECTION INTRAVENOUS; SUBCUTANEOUS at 20:29

## 2019-08-18 RX ADMIN — METOPROLOL TARTRATE 100 MG: 50 TABLET ORAL at 20:29

## 2019-08-18 RX ADMIN — METOCLOPRAMIDE 5 MG: 5 INJECTION, SOLUTION INTRAMUSCULAR; INTRAVENOUS at 12:53

## 2019-08-18 RX ADMIN — SEVELAMER CARBONATE 0.8 G: 0.8 POWDER, FOR SUSPENSION ORAL at 18:58

## 2019-08-18 RX ADMIN — Medication 250 MG: at 20:29

## 2019-08-18 RX ADMIN — INSULIN LISPRO 2 UNITS: 100 INJECTION, SOLUTION INTRAVENOUS; SUBCUTANEOUS at 12:53

## 2019-08-18 RX ADMIN — SEVELAMER CARBONATE 0.8 G: 0.8 POWDER, FOR SUSPENSION ORAL at 12:53

## 2019-08-18 RX ADMIN — Medication 250 MG: at 08:49

## 2019-08-18 RX ADMIN — INSULIN DETEMIR 15 UNITS: 100 INJECTION, SOLUTION SUBCUTANEOUS at 21:16

## 2019-08-18 RX ADMIN — SODIUM CHLORIDE, PRESERVATIVE FREE 3 ML: 5 INJECTION INTRAVENOUS at 20:30

## 2019-08-18 RX ADMIN — FAMOTIDINE 20 MG: 10 INJECTION, SOLUTION INTRAVENOUS at 08:49

## 2019-08-18 RX ADMIN — METOCLOPRAMIDE 5 MG: 5 INJECTION, SOLUTION INTRAMUSCULAR; INTRAVENOUS at 18:58

## 2019-08-18 RX ADMIN — INSULIN LISPRO 4 UNITS: 100 INJECTION, SOLUTION INTRAVENOUS; SUBCUTANEOUS at 21:28

## 2019-08-18 RX ADMIN — INSULIN LISPRO 3 UNITS: 100 INJECTION, SOLUTION INTRAVENOUS; SUBCUTANEOUS at 08:49

## 2019-08-18 RX ADMIN — DOCUSATE SODIUM 100 MG: 100 CAPSULE, LIQUID FILLED ORAL at 08:49

## 2019-08-18 RX ADMIN — TAZOBACTAM SODIUM AND PIPERACILLIN SODIUM 3.38 G: 375; 3 INJECTION, SOLUTION INTRAVENOUS at 16:22

## 2019-08-18 RX ADMIN — DULOXETINE HYDROCHLORIDE 30 MG: 30 CAPSULE, DELAYED RELEASE ORAL at 08:51

## 2019-08-18 RX ADMIN — ONDANSETRON 4 MG: 2 INJECTION INTRAMUSCULAR; INTRAVENOUS at 08:49

## 2019-08-18 RX ADMIN — HYDRALAZINE HYDROCHLORIDE 100 MG: 50 TABLET, FILM COATED ORAL at 16:18

## 2019-08-18 RX ADMIN — SEVELAMER CARBONATE 0.8 G: 0.8 POWDER, FOR SUSPENSION ORAL at 08:50

## 2019-08-18 RX ADMIN — TAMSULOSIN HYDROCHLORIDE 0.8 MG: 0.4 CAPSULE ORAL at 20:29

## 2019-08-18 RX ADMIN — INSULIN LISPRO 3 UNITS: 100 INJECTION, SOLUTION INTRAVENOUS; SUBCUTANEOUS at 18:58

## 2019-08-18 RX ADMIN — TERAZOSIN HYDROCHLORIDE ANHYDROUS 5 MG: 5 CAPSULE ORAL at 20:29

## 2019-08-18 RX ADMIN — AMLODIPINE BESYLATE 10 MG: 10 TABLET ORAL at 08:50

## 2019-08-18 RX ADMIN — TAZOBACTAM SODIUM AND PIPERACILLIN SODIUM 3.38 G: 375; 3 INJECTION, SOLUTION INTRAVENOUS at 08:55

## 2019-08-18 RX ADMIN — HEPARIN SODIUM 5000 UNITS: 5000 INJECTION INTRAVENOUS; SUBCUTANEOUS at 08:50

## 2019-08-18 NOTE — PLAN OF CARE
Problem: Patient Care Overview  Goal: Plan of Care Review   08/18/19 0600   OTHER   Outcome Summary Pt VSS. unable to urinate, Walker placed and anchored due to patient refusing to be straight cathed again. Urine thick white and creamy, specimen sent down for U/A and culture, stat CT obtained as well. IV antibiotics being given, will cont to monitor.

## 2019-08-18 NOTE — PROGRESS NOTES
"                  Clinical Nutrition     Nutrition Assessment  Reason for Visit:   Identified at risk by screening criteria, Need for education    Patient Name: Kendrick Crystal  YOB: 1970  MRN: [id number]  Date of Encounter: 08/18/19 5:00 PM  Admission date: 8/17/2019    Nutrition Assessment   Admission Diagnosis       Intractable nausea and vomiting    DUNLAP Cirrhosis    Essential hypertension    S/P BKA (below knee amputation), left (CMS/HCC)    DM (diabetes mellitus) type II uncontrolled, periph vascular disorder (CMS/HCC)    Obesity (BMI 30-39.9)    SIRS (systemic inflammatory response syndrome) (CMS/HCC)    Perirectal abscess I&D 8/2/19    Dehydration    Gastroparesis    Leukocytosis      PMH: He  has a past medical history of Abdominal wall cellulitis (5/20/2019), JUAN (acute kidney injury) (CMS/HCC) (7/29/2018), Arthritis, Bipolar 1 disorder (CMS/HCC), Cellulitis of right anterior lower leg (7/29/2018), Cirrhosis (CMS/HCC), Counseling for insulin pump, Depression, Diabetes mellitus (CMS/HCC), Encephalopathy, hepatic (CMS/HCC), H/O degenerative disc disease, History of Lissa's gangrene, Hyperlipemia, Hypertension, multiple Skin abscesses, DUNLAP, bx showed stage IV fibrosis, Neuropathy, Peripheral vascular disease (CMS/HCC), and Thrombophlebitis.   PSxH: He  has a past surgical history that includes Testicle surgery (Right); Leg Surgery; Finger amputation (Left, 1/30/2017); Leg amputation, lower tibia/fibula (Left, 3/2/2017); Esophagogastroduodenoscopy; Tonsillectomy (1975); Abdominal Wall Abscess Incision and Drainage (N/A, 4/26/2019); and Hemorrhoid surgery (N/A, 8/2/2019).       Reported/Observed/Food/Nutrition Related History:     Pt resting in bed, reports feeling better, N/V resolved, is tolerating solid food    Wife states his Ha1c was 14 on last admit    Anthropometrics     Height: 190.5 cm (75\")  Last filed wt: Weight: 105 kg (230 lb 9.6 oz) (08/17/19 5509)  Weight Method: Bed " scale    BMI: n/a      Weight Change :     Weight change: 12lb loss     Time frame of weight loss: 6 months      Bedscale weights likely not valid as pt weighed 242lb at MD office 3-5-19    Last 15 Recorded Weights   View Complete Flowsheet   Weight Weight (kg) Weight (lbs) Weight Method VISIT REPORT   8/17/2019 104.599 kg 230 lb 9.6 oz Bed scale -   8/17/2019 108.863 kg 240 lb Stated -   8/6/2019 (No Data)  (No Data)  - -   7/30/2019 113.399 kg 250 lb - -   7/12/2019 (No Data)  (No Data)  - Report   6/11/2019 108.863 kg 240 lb - Report   5/30/2019 108.863 kg 240 lb - -   5/27/2019 143.246 kg 315 lb 12.8 oz Bed scale  -   5/26/2019 138.392 kg 305 lb 1.6 oz Bed scale  -   5/25/2019 139.209 kg 306 lb 14.4 oz Bed scale -   5/24/2019 139.345 kg 307 lb 3.2 oz Bed scale  -   5/23/2019 140.434 kg 309 lb 9.6 oz Bed scale -   5/22/2019 142.52 kg 314 lb 3.2 oz Bed scale -   5/21/2019 142.248 kg 313 lb 9.6 oz Bed scale -   5/20/2019 137.893 kg 304 lb Bed scale           Labs reviewed     Results from last 7 days   Lab Units 08/17/19  1625   SODIUM mmol/L 137   POTASSIUM mmol/L 4.5   CHLORIDE mmol/L 90*   CO2 mmol/L 29.0   BUN mg/dL 35*   CREATININE mg/dL 1.91*   CALCIUM mg/dL 9.0   BILIRUBIN mg/dL 0.4   ALK PHOS U/L 168*   ALT (SGPT) U/L 17   AST (SGOT) U/L 35   GLUCOSE mg/dL 296*       Results from last 7 days   Lab Units 08/18/19  1627 08/18/19  1136 08/18/19  0730 08/17/19  2050 08/14/19  1130 08/14/19  0742   GLUCOSE mg/dL 235* 185* 218* 223* 162* 212*     Lab Results   Lab Value Date/Time    HGBA1C 14.10 (H) 07/31/2019 0506    HGBA1C 13.43 (H) 07/12/2019 1020         Medications reviewed   Pertinent:colace, pepcid, heparin, insulin, reglan, abx, probiotic, renvela, [id number]@75ml/hr             GI: N/V resolved, + bm x3    SKIN: wnl    Current Nutrition Prescription     PO: Diet Regular; Cardiac, Consistent Carbohydrate    Intake: ( 50% of 2 clear liquid meals)         Nutrition Diagnosis     Problem Altered GI function    Etiology ? Gastroparesis   Signs/Symptoms N/V     Problem Food and nutrition knowledge deficit   Etiology Uncontrolled DM   Signs/Symptoms Ha1c = 14.1 last admit     Nutrition Intervention     Interventions Goal    General: Nutrition support treatment  Establish Po  Education    Nutrition Interventions   1.  Follow treatment progress, Interview for preferences, Menu provided, Menu adjusted, Education provided  DM education provided    Monitor/ Evaluation    Per protocol, I&O, PO intake, Pertinent labs, Weight, Skin status, GI status, Symptoms      Katrina Liu, AMANDA  Time Spent: 45min

## 2019-08-18 NOTE — CONSULTS
Colon and Rectal [CSGA]    Patient Care Team:  Keyanna Leone APRN as PCP - General (Nurse Practitioner)    Chief complaint: Recurrent pelvic infection with urinary retention and UTI    Subjective     HPI: Patient is a 48-year-old male with poorly controlled diabetes with multiple complications from that.  16 days ago Dr. Payne drained an abscess between the anterior rectum and the prostate.  This apparently has now recurred on CT scan.  He presented with urinary retention and was found to have UTI.  There is still a question whether not there is a communication with the  system.  He had nausea and vomiting as well.  All this has improved with hydration, antibiotics and a Walker catheter.    Review of Systems: No other complaints other than the above.     History  Past Medical History:   Diagnosis Date   • Abdominal wall cellulitis 5/20/2019   • JUAN (acute kidney injury) (CMS/HCC) 7/29/2018   • Arthritis    • Bipolar 1 disorder (CMS/HCC)    • Cellulitis of right anterior lower leg 7/29/2018   • Cirrhosis (CMS/HCC)    • Counseling for insulin pump    • Depression    • Diabetes mellitus (CMS/HCC)    • Encephalopathy, hepatic (CMS/HCC)    • H/O degenerative disc disease    • History of Lissa's gangrene     right leg/testicle   • Hyperlipemia    • Hypertension    • multiple Skin abscesses    • DUNLAP, bx showed stage IV fibrosis    • Neuropathy    • Peripheral vascular disease (CMS/HCC)    • Thrombophlebitis      Past Surgical History:   Procedure Laterality Date   • ABDOMINAL WALL ABSCESS INCISION AND DRAINAGE N/A 4/26/2019    Procedure: ABDOMINAL WALL DEBRIDEMENT;  Surgeon: Fadi Kern MD;  Location: [facility] OR;  Service: General   • AMPUTATION DIGIT Left 1/30/2017    Procedure: left fourth and fifth transmetatarsal toe amputation ;  Surgeon: Juancho Martinez MD;  Location: [facility] OR;  Service:    • BELOW KNEE AMPUTATION Left 3/2/2017    Procedure: AMPUTATION BELOW KNEE, SHMUEL;  Surgeon: Juancho Martinez MD;   Location:  [facility] OR;  Service:    • ENDOSCOPY     • HEMORRHOIDECTOMY N/A 8/2/2019    Procedure: EXCISION AND DRAIN PERIRECTAL ABSCESS;  Surgeon: Mumtaz Payne MD;  Location:  [facility] OR;  Service: General   • LEG SURGERY     • TESTICLE SURGERY Right     DEBRIDEMENT FROM GANGRENE   • TONSILLECTOMY  1975   Colonoscopy perhaps 6 years ago and one is pending.  Family History   Problem Relation Age of Onset   • Arthritis Mother    • Hypertension Mother    • Kidney disease Mother    • Stroke Mother    • Heart attack Father    • Arthritis Father    • Diabetes Father    • Hyperlipidemia Father    • Hypertension Father    • Mental illness Sister    • Diabetes Brother    • Hypertension Brother    • Obesity Brother    Father had colon cancer  Social History     Tobacco Use   • Smoking status: Never Smoker   • Smokeless tobacco: Never Used   Substance Use Topics   • Alcohol use: No   • Drug use: No     Medications Prior to Admission   Medication Sig Dispense Refill Last Dose   • amLODIPine (NORVASC) 10 MG tablet Take 1 tablet by mouth Daily. 30 tablet 0 8/14/2019   • cefuroxime (CEFTIN) 500 MG tablet Take 1 tablet by mouth 2 (Two) Times a Day for 7 days. 14 tablet 0 8/14/2019   • DULoxetine (CYMBALTA) 30 MG capsule Take 1 capsule by mouth Daily. 90 capsule 1 8/14/2019   • hydrALAZINE (APRESOLINE) 100 MG tablet Take 1 tablet by mouth Every 8 (Eight) Hours for 30 days. 90 tablet 0 8/14/2019   • HYDROcodone-acetaminophen (NORCO)  MG per tablet Take 1 tablet by mouth 3 (Three) Times a Day As Needed for Moderate Pain  or Severe Pain .   8/14/2019   • insulin lispro (humaLOG) 100 UNIT/ML injection Inject 5 Units under the skin into the appropriate area as directed 4 (Four) Times a Day With Meals & at Bedtime. Plus sliding scale 60 mL 5 8/14/2019   • LANTUS 100 UNIT/ML injection Inject 30 Units under the skin into the appropriate area as directed 2 (Two) Times a Day. 60 mL 1 8/14/2019   • metoprolol tartrate (LOPRESSOR) 100 MG  "tablet Take 100 mg by mouth Every 12 (Twelve) Hours.      • metroNIDAZOLE (FLAGYL) 500 MG tablet Take 1 tablet by mouth 2 (Two) Times a Day for 7 days. 14 tablet 0 2019   • promethazine (PHENERGAN) 12.5 MG tablet Take 1 tablet by mouth Every 6 (Six) Hours As Needed for Nausea or Vomiting. 20 tablet 0 2019 at Unknown time   • saccharomyces boulardii (FLORASTOR) 250 MG capsule Take 1 capsule by mouth 2 (Two) Times a Day for 14 days. 28 capsule 0 2019   • sevelamer (RENVELA) 0.8 g pack packet Take 1 packet by mouth 3 (Three) Times a Day With Meals. 90 packet 0 2019   • tamsulosin (FLOMAX) 0.4 MG capsule 24 hr capsule Take 2 capsules by mouth Every Night. 180 capsule 1 2019   • terazosin (HYTRIN) 5 MG capsule Take 1 capsule by mouth Every Night. 30 capsule 0 2019   • [] erythromycin base (E-MYCIN) 250 MG tablet Take 1 tablet by mouth 3 (Three) Times a Day With Meals for 5 doses. 5 tablet 0      Allergies:  Patient has no known allergies.    Objective     Vital Signs  Blood pressure 150/92, pulse 75, temperature 99.1 °F (37.3 °C), temperature source Oral, resp. rate 18, height 190.5 cm (75\"), weight 105 kg (230 lb 9.6 oz), SpO2 94 %.    Physical Exam: Alert oriented no acute distress.  HEENT normal  Neck supple  Lungs clear  Heart regular  Abdomen soft and benign  External anal exam unremarkable.  Digital exam nontender and no obvious abscess appreciated.  I do not see any fistula or open wound.  I do not see where the prior surgical site was.  Extremities left BKA with a well-healed stump.  Right lower extremity normal  Musculoskeletal otherwise unremarkable     Results Review:  Lab Results (last 24 hours)     Procedure Component Value Units Date/Time    Urine Culture - Urine, Urine, Catheter [[id number]]  (Normal) Collected:  19 1729    Specimen:  Urine, Catheter Updated:  19 1238     Urine Culture No growth    POC Glucose Once [[id number]]  (Abnormal) Collected:  19 " 1136    Specimen:  Blood Updated:  08/18/19 1138     Glucose 185 mg/dL     POC Glucose Once [[id number]]  (Abnormal) Collected:  08/18/19 0730    Specimen:  Blood Updated:  08/18/19 0732     Glucose 218 mg/dL     Urinalysis, Microscopic Only - Urine, Clean Catch [[id number]]  (Abnormal) Collected:  08/18/19 0407    Specimen:  Urine, Clean Catch Updated:  08/18/19 0429     RBC, UA 3-6 /HPF      WBC, UA Too Numerous to Count /HPF      Bacteria, UA None Seen /HPF      Squamous Epithelial Cells, UA Too Numerous to Count /HPF      Methodology Manual Light Microscopy    Urine Culture - Urine, Urine, Clean Catch [[id number]] Collected:  08/18/19 0407    Specimen:  Urine, Clean Catch Updated:  08/18/19 0429    Urinalysis With Culture If Indicated - Urine, Clean Catch [[id number]]  (Abnormal) Collected:  08/18/19 0407    Specimen:  Urine, Clean Catch Updated:  08/18/19 0421     Color, UA Yellow     Appearance, UA Turbid     pH, UA 5.5     Specific Gravity, UA 1.015     Glucose, UA Negative     Ketones, UA Trace     Bilirubin, UA Negative     Blood, UA Small (1+)     Protein, UA >=300 mg/dL (3+)     Leuk Esterase, UA Large (3+)     Nitrite, UA Negative     Urobilinogen, UA 0.2 E.U./dL    Lactic Acid, Reflex [[id number]]  (Normal) Collected:  08/17/19 2032    Specimen:  Blood Updated:  08/17/19 2114     Lactate 1.6 mmol/L      Comment: Falsely depressed results may occur on samples drawn from patients receiving N-Acetylcysteine (NAC) or Metamizole.       POC Glucose Once [[id number]]  (Abnormal) Collected:  08/17/19 2050    Specimen:  Blood Updated:  08/17/19 2052     Glucose 223 mg/dL     Lactic Acid, Reflex Timer (This will reflex a repeat order 3-3:15 hours after ordered.) [[id number]] Collected:  08/17/19 1625    Specimen:  Blood Updated:  08/17/19 2000     Extra Tube Hold for add-ons.     Comment: Auto resulted.       Urinalysis, Microscopic Only - Urine, Catheter [[id number]]  (Abnormal) Collected:  08/17/19 1729    Specimen:   Urine, Catheter Updated:  08/17/19 1755     RBC, UA 0-2 /HPF      WBC, UA Too Numerous to Count /HPF      Bacteria, UA 3+ /HPF      Squamous Epithelial Cells, UA 0-2 /HPF      Hyaline Casts, UA 0-6 /LPF      WBC Casts 3-6 /LPF      Methodology Manual Light Microscopy    Blood Culture - Blood, Arm, Left [[id number]] Collected:  08/17/19 1735    Specimen:  Blood from Arm, Left Updated:  08/17/19 1754    Blood Culture - Blood, Hand, Left [[id number]] Collected:  08/17/19 1745    Specimen:  Blood from Hand, Left Updated:  08/17/19 1754    Urinalysis With Microscopic If Indicated (No Culture) - Urine, Catheter [[id number]]  (Abnormal) Collected:  08/17/19 1729    Specimen:  Urine, Catheter Updated:  08/17/19 1743     Color, UA Yellow     Appearance, UA Cloudy     pH, UA 5.5     Specific Gravity, UA 1.010     Glucose,  mg/dL (1+)     Ketones, UA Negative     Bilirubin, UA Negative     Blood, UA Trace     Protein, UA 30 mg/dL (1+)     Leuk Esterase, UA Large (3+)     Nitrite, UA Negative     Urobilinogen, UA 0.2 E.U./dL    [place] Draw [[id number]] Collected:  08/17/19 1625    Specimen:  Blood Updated:  08/17/19 1730    Narrative:       The following orders were created for panel order [place] Draw.  Procedure                               Abnormality         Status                     ---------                               -----------         ------                     Light Blue Top[[id number]]                                   Final result               Green Top (Gel)[[id number]]                                  Final result               Lavender Top[[id number]]                                     Final result               Gold Top - SST[[id number]]                                   Final result               Green Top (No Gel)[[id number]]                               Final result                 Please view results for these tests on the individual orders.    Light Blue Top [[id number]] Collected:  08/17/19 1625     Specimen:  Blood Updated:  08/17/19 1730     Extra Tube hold for add-on     Comment: Auto resulted       Green Top (Gel) [[id number]] Collected:  08/17/19 1625    Specimen:  Blood Updated:  08/17/19 1730     Extra Tube Hold for add-ons.     Comment: Auto resulted.       Gold Top - SST [[id number]] Collected:  08/17/19 1625    Specimen:  Blood Updated:  08/17/19 1730     Extra Tube Hold for add-ons.     Comment: Auto resulted.       Lavender Top [[id number]] Collected:  08/17/19 1625    Specimen:  Blood Updated:  08/17/19 1730     Extra Tube hold for add-on     Comment: Auto resulted       Green Top (No Gel) [[id number]] Collected:  08/17/19 1625    Specimen:  Blood Updated:  08/17/19 1730     Extra Tube Hold for add-ons.     Comment: Auto resulted.       Lactic Acid, Plasma [[id number]]  (Abnormal) Collected:  08/17/19 1625    Specimen:  Blood Updated:  08/17/19 1658     Lactate 3.7 mmol/L      Comment: Falsely depressed results may occur on samples drawn from patients receiving N-Acetylcysteine (NAC) or Metamizole.       Comprehensive Metabolic Panel [[id number]]  (Abnormal) Collected:  08/17/19 1625    Specimen:  Blood Updated:  08/17/19 1656     Glucose 296 mg/dL      BUN 35 mg/dL      Creatinine 1.91 mg/dL      Sodium 137 mmol/L      Potassium 4.5 mmol/L      Comment: Specimen hemolyzed.  Results may be affected.        Chloride 90 mmol/L      CO2 29.0 mmol/L      Calcium 9.0 mg/dL      Total Protein 7.5 g/dL      Albumin 3.90 g/dL      ALT (SGPT) 17 U/L      Comment: Specimen hemolyzed.  Results may be affected.        AST (SGOT) 35 U/L      Comment: Specimen hemolyzed.  Results may be affected.        Alkaline Phosphatase 168 U/L      Total Bilirubin 0.4 mg/dL      eGFR Non African Amer 37 mL/min/1.73      Globulin 3.6 gm/dL      A/G Ratio 1.1 g/dL      BUN/Creatinine Ratio 18.3     Anion Gap 18.0 mmol/L     Narrative:       GFR Normal >60  Chronic Kidney Disease <60  Kidney Failure <15    Lipase [[id number]]   (Normal) Collected:  08/17/19 1625    Specimen:  Blood Updated:  08/17/19 1654     Lipase 29 U/L     CBC & Differential [[id number]] Collected:  08/17/19 1625    Specimen:  Blood Updated:  08/17/19 1633    Narrative:       The following orders were created for panel order CBC & Differential.  Procedure                               Abnormality         Status                     ---------                               -----------         ------                     CBC Auto Differential[[id number]]        Abnormal            Final result                 Please view results for these tests on the individual orders.    CBC Auto Differential [[id number]]  (Abnormal) Collected:  08/17/19 1625    Specimen:  Blood Updated:  08/17/19 1633     WBC 16.28 10*3/mm3      RBC 5.08 10*6/mm3      Hemoglobin 14.0 g/dL      Hematocrit 42.9 %      MCV 84.4 fL      MCH 27.6 pg      MCHC 32.6 g/dL      RDW 15.1 %      RDW-SD 46.4 fl      MPV 11.7 fL      Platelets 305 10*3/mm3      Neutrophil % 80.1 %      Lymphocyte % 11.9 %      Monocyte % 6.3 %      Eosinophil % 0.5 %      Basophil % 0.7 %      Immature Grans % 0.5 %      Neutrophils, Absolute 13.05 10*3/mm3      Lymphocytes, Absolute 1.94 10*3/mm3      Monocytes, Absolute 1.02 10*3/mm3      Eosinophils, Absolute 0.08 10*3/mm3      Basophils, Absolute 0.11 10*3/mm3      Immature Grans, Absolute 0.08 10*3/mm3      nRBC 0.0 /100 WBC          Imaging Results (last 24 hours)     Procedure Component Value Units Date/Time    CT Abdomen Pelvis Without Contrast [[id number]] Collected:  08/17/19 1848     Updated:  08/18/19 0947    Narrative:       EXAMINATION: CT ABDOMEN/PELVIS WO CONTRAST - 08/17/2019     INDICATION:  R53.1-Weakness; D72.829-Elevated white blood cell count,  unspecified; Z78.9-Other specified health status. Weakness, abdominal  pain.     TECHNIQUE: Multiple axial CT imaging is obtained of the abdomen and  pelvis without the administration of oral or intravenous contrast.     The  radiation dose reduction device was turned on for each scan per the  ALARA (As Low as Reasonably Achievable) protocol.     COMPARISON: 07/30/2019     FINDINGS:      ABDOMEN: The lung bases are grossly clear. The liver is homogeneous in  appearance. No stones in the gallbladder. The spleen is unremarkable.  Pancreas is within normal limits. No abdominal retroperitoneal  lymphadenopathy. The kidneys and adrenal glands are stable with  bilateral myelolipomas present. No free fluid or free air. The appendix  is radiographically normal in appearance. No abnormal mass or fluid  collections identified. The abdominal portion of the gastrointestinal  tract is within normal limits.     PELVIS: The pelvic organs reveal abnormal wall thickening of the  bladder. There is incomplete distention. Cystitis cannot be excluded.  There is stool seen within the rectum. No definite abscess collection  identified on today's examination. The pelvic portion of the  gastrointestinal tract is within normal limits. No free fluid or free  air. No abnormal mass or fluid collections identified. Previously seen  fluid collection inferior to the prostate is more increased in density  on today's examination. No definite abnormal fluid collection  identified.       Impression:       Previously seen fluid collection identified inferior to the  prostate gland is more increased in density on today's examination.  There is wall thickening again seen throughout the bladder. Findings  again are concerning for cystitis. Cystography can be performed for  further evaluation. Remainder of the CT abdomen and pelvis is stable.     DICTATED:   08/17/2019  EDITED/ls :   08/17/2019         This report was finalized on 8/18/2019 9:44 AM by Dr. Nella Enriquez MD.       XR Chest 1 View [[id number]] Collected:  08/17/19 1842     Updated:  08/18/19 0947    Narrative:          EXAMINATION: XR CHEST, SINGLE VIEW - 08/17/2019     INDICATION:  R53.1-Weakness;  D72.829-Elevated white blood cell count,  unspecified; Z78.9-Other specified health status.      COMPARISON: None.     FINDINGS: Portable chest reveals cardiac and mediastinal silhouettes  within normal limits. Bony structures are unremarkable. No pleural  effusion or pneumothorax. No focal parenchymal opacification present.          Impression:       No acute cardiopulmonary disease.     DICTATED:   08/17/2019  EDITED/ls :   08/17/2019      This report was finalized on 8/18/2019 9:44 AM by Dr. Nella Enriquez MD.       CT Abdomen Pelvis Without Contrast [[id number]] Collected:  08/18/19 0331     Updated:  08/18/19 0333    Narrative:       CT Abdomen Pelvis WO    INDICATION:   Abdominal pain and elevated white count    TECHNIQUE:   CT of the abdomen and pelvis without contrast. Coronal and sagittal reconstructions were obtained.  Radiation dose reduction techniques included automated exposure control or exposure modulation based on body size. Radiation audit for number of CT and  nuclear cardiology exams performed in the last year: 6.      COMPARISON:   8/17/2019 at 6:32 PM    FINDINGS:  Included lung bases are clear. The heart size is the upper limits of normal.    Abdomen: Noncontrasted imaging of the liver, gallbladder, spleen, pancreas and kidneys are within normal limits. Redemonstrated are bilateral adrenal nodules. The aorta is within normal limits. No threshold retroperitoneal adenopathy.    Pelvis: The bowel pattern is nonobstructive. No free air. No focal inflammatory change. Walker catheter demonstrated in the bladder. No threshold pelvic or inguinal adenopathy.    Regional osseous structures show no acute or aggressive bone lesions. Degenerative changes in the lower lumbar spine.      Impression:         1.  Interval placement of Walker catheter in bladder from prior study.  2.  Bilateral adrenal nodules which are stable from prior study.          Signer Name: Shane Watts MD   Signed: 8/18/2019 3:31  AM   Workstation Name: [id number]    [facility]           Assessment/Plan       Intractable nausea and vomiting    DUNLAP Cirrhosis    Essential hypertension    Acute kidney injury (CMS/HCC)    S/P BKA (below knee amputation), left (CMS/HCC)    DM (diabetes mellitus) type II uncontrolled, periph vascular disorder (CMS/HCC)    Obesity (BMI 30-39.9)    SIRS (systemic inflammatory response syndrome) (CMS/HCC)    Lactic acidosis    Perirectal abscess I&D 8/2/19    Dehydration    Gastroparesis    Leukocytosis    Recommendation: The patient will likely need a Malecot drain or something of the sort placed in the abscess under anesthesia again.  I will discuss this with Dr. Payne today.  Hopefully can be arranged this week.      I discussed the patients findings and my recommendations with patient and family.     Kendrick Sunshine MD  08/18/19  1:24 PM

## 2019-08-18 NOTE — PROGRESS NOTES
[facility] Medicine Services  INPATIENT PROGRESS NOTE    Date of Admission: 8/17/2019  Length of Stay: 1  Primary Care Physician: Keyanna Leone APRN    Subjective     Chief Complaint: Nausea    HPI:  Patient says he feels still horrible.  Feels like he did yesterday lightheaded like he is going to vomit.  Diarrhea    Review Of Systems:   Patient denies headaches, fever, chills, shortness of breath, chest pain, cough, abdominal pain, nausea or vomiting, diarrhea, rash, itching or bleeding      Objective      Vitals:   99.1 °F (37.3 °C) Temp  Min: 97.9 °F (36.6 °C)  Max: 99.9 °F (37.7 °C)    150/92 BP  Min: 110/75  Max: 187/107    75 Pulse  Min: 75  Max: 107    18 Resp  Min: 16  Max: 18    94 % SpO2  Min: 94 %  Max: 99 %    room air       No Data Recorded     Patient is alert and talkative in no distress at rest-verbal but not toxic answering questions appropriately  Neck is without mass or JVD  Heart is Reg wo murmur  Lungs are clear wo wheeze or crackle  Abd is soft without HSM or mass, not distended or tender to palpation  MAEW left BKA  Skin is without rash  Neurologic exam is nonfocal   Mood is appropriate      Results Review:    I have reviewed the labs, radiology results and diagnostic studies.    Results from last 7 days   Lab Units 08/17/19  1625 08/12/19  0627   WBC 10*3/mm3 16.28* 13.70*   HEMOGLOBIN g/dL 14.0 11.2*   HEMATOCRIT % 42.9 34.9*   PLATELETS 10*3/mm3 305 255     Results from last 7 days   Lab Units 08/17/19  1625 08/14/19  0642 08/13/19  0410   SODIUM mmol/L 137 139 141  139   POTASSIUM mmol/L 4.5 3.5 3.9  3.9   CHLORIDE mmol/L 90* 96* 100  99   CO2 mmol/L 29.0 26.0 21.0*  25.0   BUN mg/dL 35* 44* 50*  49*   CREATININE mg/dL 1.91* 2.58* 3.00*  3.24*   GLUCOSE mg/dL 296* 234* 193*  195*   CALCIUM mg/dL 9.0 8.4* 8.4*  8.1*   ALT (SGPT) U/L 17  --  12   AST (SGOT) U/L 35  --  16       Microbiology Results Abnormal     Procedure Component Value - Date/Time     Urine Culture - Urine, Urine, Catheter [[id number]]  (Normal) Collected:  08/17/19 1729    Lab Status:  Preliminary result Specimen:  Urine, Catheter Updated:  08/18/19 1238     Urine Culture No growth        Ct Abdomen Pelvis Without Contrast    Result Date: 8/18/2019  Previously seen fluid collection identified inferior to the prostate gland is more increased in density on today's examination. There is wall thickening again seen throughout the bladder. Findings again are concerning for cystitis. Cystography can be performed for further evaluation. Remainder of the CT abdomen and pelvis is stable.  DICTATED:   08/17/2019 EDITED/ls :   08/17/2019   This report was finalized on 8/18/2019 9:44 AM by Dr. Nella Enriquez MD.      Ct Abdomen Pelvis Without Contrast    Result Date: 8/18/2019  1.  Interval placement of Walker catheter in bladder from prior study. 2.  Bilateral adrenal nodules which are stable from prior study. Signer Name: Shane Watts MD  Signed: 8/18/2019 3:31 AM  Workstation Name: [facility]  [facility]    Xr Chest 1 View    Result Date: 8/18/2019  No acute cardiopulmonary disease.  DICTATED:   08/17/2019 EDITED/ls :   08/17/2019  This report was finalized on 8/18/2019 9:44 AM by Dr. Nella Enriquez MD.      Results for orders placed during the hospital encounter of 05/18/19   Adult Transthoracic Echo Complete W/ Cont if Necessary Per Protocol    Narrative · Estimated EF = 60%.  · Mild mitral valve regurgitation is present  · Mild tricuspid valve regurgitation is present.  · Calculated right ventricular systolic pressure from tricuspid   regurgitation is 29 mmHg.          I have reviewed the medications.    Assessment/Plan     Assessment/Problem List    Intractable nausea and vomiting    DUNLAP Cirrhosis    Essential hypertension    Acute kidney injury (CMS/HCC)    S/P BKA (below knee amputation), left (CMS/HCC)    DM (diabetes mellitus) type II uncontrolled, periph  vascular disorder (CMS/HCC)    Obesity (BMI 30-39.9)    SIRS (systemic inflammatory response syndrome) (CMS/HCC)    Lactic acidosis    Perirectal abscess I&D 8/2/19    Dehydration    Gastroparesis    Leukocytosis      Plan  48-year-old with persistent perirectal abscess.  With recent I&D resents with recurrent nausea vomiting, leukocytosis  --Continue aggressive IV hydration  --Appreciate Dr. ross seen for Dr. Payne for possible repeat surgical intervention or drainage.  --Renal insufficiency actually improved from few days ago  --Continue Invanz  --Lactic acidosis resolved    DVT prophylaxis: Heparin subcu  Discharge Planning: To be determined.    Electronically signed by Shauna Reddy MD, 08/18/19 2:27 PM .

## 2019-08-18 NOTE — OUTREACH NOTE
Sepsis Week 2 Survey      Responses   Facility patient discharged from?  [facility]   Does the patient have one of the following disease processes/diagnoses(primary or secondary)?  Sepsis   Week 2 attempt successful?  No   Revoke  Readmitted          Santa Marks RN

## 2019-08-18 NOTE — PLAN OF CARE
Problem: Patient Care Overview  Goal: Plan of Care Review  Outcome: Ongoing (interventions implemented as appropriate)      Problem: Pain, Chronic (Adult)  Goal: Acceptable Pain/Comfort Level and Functional Ability  Outcome: Ongoing (interventions implemented as appropriate)      Problem: Skin Injury Risk (Adult)  Goal: Skin Health and Integrity  Outcome: Ongoing (interventions implemented as appropriate)

## 2019-08-18 NOTE — PROGRESS NOTES
Discharge Planning Assessment  [facility]     Patient Name: Kendrick Crystal  MRN: [id number]  Today's Date: 8/18/2019    Admit Date: 8/17/2019    Discharge Needs Assessment     Row Name 08/18/19 8288       Living Environment    Lives With  spouse;child(yennifer), adult    Name(s) of Who Lives With Patient  Cindy Crystal- spouse    Current Living Arrangements  home/apartment/condo    Primary Care Provided by  spouse/significant other    Provides Primary Care For  no one, unable/limited ability to care for self    Family Caregiver if Needed  spouse    Family Caregiver Names  Cindy    Quality of Family Relationships  helpful;involved;supportive    Able to Return to Prior Arrangements  yes       Resource/Environmental Concerns    Resource/Environmental Concerns  none    Transportation Concerns  car, none       Transition Planning    Patient/Family Anticipates Transition to  home with family    Patient/Family Anticipated Services at Transition  ;home health care    Transportation Anticipated  family or friend will provide       Discharge Needs Assessment    Concerns to be Addressed  discharge planning    Equipment Currently Used at Home  walker, rolling;wheelchair, motorized;bath bench;commode;prosthesis    Equipment Needed After Discharge  none    Outpatient/Agency/Support Group Needs  homecare agency    Discharge Facility/Level of Care Needs  home with home health        Discharge Plan     Row Name 08/18/19 9445       Plan    Plan  Home with     Patient/Family in Agreement with Plan  yes    Plan Comments  Spoke with pt. and spouse at bedside. Plans are to dc to home with HH. Lives in [facility]. Per wife is current with [facility]. Has multiple DME and denies need for additional DME.     Final Discharge Disposition Code  06 - home with home health care        Destination      No service coordination in this encounter.      Durable Medical Equipment      No service coordination in this encounter.       Dialysis/Infusion      No service coordination in this encounter.      Home Medical Care      No service coordination in this encounter.      Therapy      No service coordination in this encounter.      Community Resources      No service coordination in this encounter.          Demographic Summary    No documentation.       Functional Status     Row Name 08/18/19 1353       Functional Status    Usual Activity Tolerance  poor       Functional Status, IADL    Medications  assistive person    Meal Preparation  assistive person    Housekeeping  assistive person    Laundry  assistive person    Shopping  assistive person        Psychosocial    No documentation.       Abuse/Neglect    No documentation.       Legal    No documentation.       Substance Abuse    No documentation.       Patient Forms    No documentation.           Brie Gaviria RN

## 2019-08-19 LAB
ANION GAP SERPL CALCULATED.3IONS-SCNC: 16 MMOL/L (ref 5–15)
BACTERIA SPEC AEROBE CULT: ABNORMAL
BACTERIA SPEC AEROBE CULT: NO GROWTH
BASOPHILS # BLD AUTO: 0.11 10*3/MM3 (ref 0–0.2)
BASOPHILS NFR BLD AUTO: 0.9 % (ref 0–1.5)
BUN BLD-MCNC: 35 MG/DL (ref 6–20)
BUN/CREAT SERPL: 18.1 (ref 7–25)
CALCIUM SPEC-SCNC: 7.6 MG/DL (ref 8.6–10.5)
CHLORIDE SERPL-SCNC: 95 MMOL/L (ref 98–107)
CO2 SERPL-SCNC: 23 MMOL/L (ref 22–29)
CREAT BLD-MCNC: 1.93 MG/DL (ref 0.76–1.27)
CRP SERPL-MCNC: 0.62 MG/DL (ref 0–0.5)
DEPRECATED RDW RBC AUTO: 47.7 FL (ref 37–54)
EOSINOPHIL # BLD AUTO: 0.23 10*3/MM3 (ref 0–0.4)
EOSINOPHIL NFR BLD AUTO: 1.8 % (ref 0.3–6.2)
ERYTHROCYTE [DISTWIDTH] IN BLOOD BY AUTOMATED COUNT: 15 % (ref 12.3–15.4)
GFR SERPL CREATININE-BSD FRML MDRD: 37 ML/MIN/1.73
GLUCOSE BLD-MCNC: 278 MG/DL (ref 65–99)
GLUCOSE BLDC GLUCOMTR-MCNC: 121 MG/DL (ref 70–130)
GLUCOSE BLDC GLUCOMTR-MCNC: 228 MG/DL (ref 70–130)
GLUCOSE BLDC GLUCOMTR-MCNC: 275 MG/DL (ref 70–130)
GLUCOSE BLDC GLUCOMTR-MCNC: 275 MG/DL (ref 70–130)
HCT VFR BLD AUTO: 36.4 % (ref 37.5–51)
HGB BLD-MCNC: 11.6 G/DL (ref 13–17.7)
IMM GRANULOCYTES # BLD AUTO: 0.05 10*3/MM3 (ref 0–0.05)
IMM GRANULOCYTES NFR BLD AUTO: 0.4 % (ref 0–0.5)
LYMPHOCYTES # BLD AUTO: 2.27 10*3/MM3 (ref 0.7–3.1)
LYMPHOCYTES NFR BLD AUTO: 17.8 % (ref 19.6–45.3)
MCH RBC QN AUTO: 27.9 PG (ref 26.6–33)
MCHC RBC AUTO-ENTMCNC: 31.9 G/DL (ref 31.5–35.7)
MCV RBC AUTO: 87.5 FL (ref 79–97)
MONOCYTES # BLD AUTO: 0.94 10*3/MM3 (ref 0.1–0.9)
MONOCYTES NFR BLD AUTO: 7.4 % (ref 5–12)
NEUTROPHILS # BLD AUTO: 9.16 10*3/MM3 (ref 1.7–7)
NEUTROPHILS NFR BLD AUTO: 71.7 % (ref 42.7–76)
NRBC BLD AUTO-RTO: 0 /100 WBC (ref 0–0.2)
PLATELET # BLD AUTO: 227 10*3/MM3 (ref 140–450)
PMV BLD AUTO: 12.6 FL (ref 6–12)
POTASSIUM BLD-SCNC: 3.5 MMOL/L (ref 3.5–5.2)
RBC # BLD AUTO: 4.16 10*6/MM3 (ref 4.14–5.8)
SODIUM BLD-SCNC: 134 MMOL/L (ref 136–145)
WBC NRBC COR # BLD: 12.76 10*3/MM3 (ref 3.4–10.8)

## 2019-08-19 PROCEDURE — 25010000002 LORAZEPAM PER 2 MG: Performed by: INTERNAL MEDICINE

## 2019-08-19 PROCEDURE — 86140 C-REACTIVE PROTEIN: CPT | Performed by: INTERNAL MEDICINE

## 2019-08-19 PROCEDURE — 25010000002 METOCLOPRAMIDE PER 10 MG: Performed by: NURSE PRACTITIONER

## 2019-08-19 PROCEDURE — 63710000001 INSULIN DETEMIR PER 5 UNITS: Performed by: INTERNAL MEDICINE

## 2019-08-19 PROCEDURE — 85025 COMPLETE CBC W/AUTO DIFF WBC: CPT | Performed by: INTERNAL MEDICINE

## 2019-08-19 PROCEDURE — 80048 BASIC METABOLIC PNL TOTAL CA: CPT | Performed by: INTERNAL MEDICINE

## 2019-08-19 PROCEDURE — 82962 GLUCOSE BLOOD TEST: CPT

## 2019-08-19 PROCEDURE — 99233 SBSQ HOSP IP/OBS HIGH 50: CPT | Performed by: INTERNAL MEDICINE

## 2019-08-19 PROCEDURE — 63710000001 INSULIN LISPRO (HUMAN) PER 5 UNITS: Performed by: INTERNAL MEDICINE

## 2019-08-19 PROCEDURE — 25010000002 HEPARIN (PORCINE) PER 1000 UNITS: Performed by: INTERNAL MEDICINE

## 2019-08-19 PROCEDURE — 25010000002 PIPERACILLIN SOD-TAZOBACTAM PER 1 G: Performed by: NURSE PRACTITIONER

## 2019-08-19 RX ORDER — ALPRAZOLAM 0.5 MG/1
0.5 TABLET ORAL 2 TIMES DAILY PRN
Status: DISCONTINUED | OUTPATIENT
Start: 2019-08-19 | End: 2019-08-23 | Stop reason: HOSPADM

## 2019-08-19 RX ORDER — QUETIAPINE FUMARATE 25 MG/1
12.5 TABLET, FILM COATED ORAL EVERY 6 HOURS PRN
Status: DISCONTINUED | OUTPATIENT
Start: 2019-08-19 | End: 2019-08-23 | Stop reason: HOSPADM

## 2019-08-19 RX ORDER — HALOPERIDOL 2 MG/ML
2 SOLUTION ORAL EVERY 6 HOURS PRN
Status: DISCONTINUED | OUTPATIENT
Start: 2019-08-19 | End: 2019-08-19

## 2019-08-19 RX ORDER — LORAZEPAM 2 MG/ML
1 INJECTION INTRAMUSCULAR EVERY 4 HOURS PRN
Status: DISCONTINUED | OUTPATIENT
Start: 2019-08-19 | End: 2019-08-23 | Stop reason: HOSPADM

## 2019-08-19 RX ADMIN — METOCLOPRAMIDE 5 MG: 5 INJECTION, SOLUTION INTRAMUSCULAR; INTRAVENOUS at 11:31

## 2019-08-19 RX ADMIN — INSULIN LISPRO 5 UNITS: 100 INJECTION, SOLUTION INTRAVENOUS; SUBCUTANEOUS at 16:36

## 2019-08-19 RX ADMIN — HYDRALAZINE HYDROCHLORIDE 100 MG: 50 TABLET, FILM COATED ORAL at 22:17

## 2019-08-19 RX ADMIN — METOPROLOL TARTRATE 100 MG: 50 TABLET ORAL at 08:55

## 2019-08-19 RX ADMIN — SEVELAMER CARBONATE 0.8 G: 0.8 POWDER, FOR SUSPENSION ORAL at 11:39

## 2019-08-19 RX ADMIN — INSULIN LISPRO 4 UNITS: 100 INJECTION, SOLUTION INTRAVENOUS; SUBCUTANEOUS at 08:47

## 2019-08-19 RX ADMIN — TAZOBACTAM SODIUM AND PIPERACILLIN SODIUM 3.38 G: 375; 3 INJECTION, SOLUTION INTRAVENOUS at 00:01

## 2019-08-19 RX ADMIN — INSULIN DETEMIR 15 UNITS: 100 INJECTION, SOLUTION SUBCUTANEOUS at 22:22

## 2019-08-19 RX ADMIN — HEPARIN SODIUM 5000 UNITS: 5000 INJECTION INTRAVENOUS; SUBCUTANEOUS at 08:46

## 2019-08-19 RX ADMIN — DOCUSATE SODIUM 100 MG: 100 CAPSULE, LIQUID FILLED ORAL at 22:16

## 2019-08-19 RX ADMIN — ALPRAZOLAM 0.5 MG: 0.5 TABLET ORAL at 22:17

## 2019-08-19 RX ADMIN — SODIUM CHLORIDE, PRESERVATIVE FREE 3 ML: 5 INJECTION INTRAVENOUS at 22:22

## 2019-08-19 RX ADMIN — INSULIN LISPRO 4 UNITS: 100 INJECTION, SOLUTION INTRAVENOUS; SUBCUTANEOUS at 11:32

## 2019-08-19 RX ADMIN — INSULIN DETEMIR 15 UNITS: 100 INJECTION, SOLUTION SUBCUTANEOUS at 13:06

## 2019-08-19 RX ADMIN — LORAZEPAM 1 MG: 2 INJECTION INTRAMUSCULAR; INTRAVENOUS at 13:06

## 2019-08-19 RX ADMIN — SEVELAMER CARBONATE 0.8 G: 0.8 POWDER, FOR SUSPENSION ORAL at 08:45

## 2019-08-19 RX ADMIN — FAMOTIDINE 20 MG: 10 INJECTION, SOLUTION INTRAVENOUS at 08:46

## 2019-08-19 RX ADMIN — TAMSULOSIN HYDROCHLORIDE 0.8 MG: 0.4 CAPSULE ORAL at 22:16

## 2019-08-19 RX ADMIN — HEPARIN SODIUM 5000 UNITS: 5000 INJECTION INTRAVENOUS; SUBCUTANEOUS at 22:13

## 2019-08-19 RX ADMIN — HYDRALAZINE HYDROCHLORIDE 100 MG: 50 TABLET, FILM COATED ORAL at 08:46

## 2019-08-19 RX ADMIN — TAZOBACTAM SODIUM AND PIPERACILLIN SODIUM 3.38 G: 375; 3 INJECTION, SOLUTION INTRAVENOUS at 16:34

## 2019-08-19 RX ADMIN — AMLODIPINE BESYLATE 10 MG: 10 TABLET ORAL at 08:46

## 2019-08-19 RX ADMIN — TERAZOSIN HYDROCHLORIDE ANHYDROUS 5 MG: 5 CAPSULE ORAL at 22:18

## 2019-08-19 RX ADMIN — SODIUM CHLORIDE, PRESERVATIVE FREE 3 ML: 5 INJECTION INTRAVENOUS at 08:48

## 2019-08-19 RX ADMIN — TAZOBACTAM SODIUM AND PIPERACILLIN SODIUM 3.38 G: 375; 3 INJECTION, SOLUTION INTRAVENOUS at 09:09

## 2019-08-19 RX ADMIN — INSULIN LISPRO 3 UNITS: 100 INJECTION, SOLUTION INTRAVENOUS; SUBCUTANEOUS at 16:35

## 2019-08-19 RX ADMIN — METOCLOPRAMIDE 5 MG: 5 INJECTION, SOLUTION INTRAMUSCULAR; INTRAVENOUS at 08:46

## 2019-08-19 RX ADMIN — Medication 250 MG: at 08:47

## 2019-08-19 RX ADMIN — DULOXETINE HYDROCHLORIDE 30 MG: 30 CAPSULE, DELAYED RELEASE ORAL at 08:48

## 2019-08-19 RX ADMIN — Medication 250 MG: at 22:17

## 2019-08-19 RX ADMIN — QUETIAPINE FUMARATE 12.5 MG: 25 TABLET ORAL at 22:18

## 2019-08-19 RX ADMIN — METOPROLOL TARTRATE 100 MG: 50 TABLET ORAL at 22:17

## 2019-08-19 RX ADMIN — FAMOTIDINE 20 MG: 10 INJECTION, SOLUTION INTRAVENOUS at 22:13

## 2019-08-19 RX ADMIN — DOCUSATE SODIUM 100 MG: 100 CAPSULE, LIQUID FILLED ORAL at 08:46

## 2019-08-19 NOTE — PROGRESS NOTES
Continued Stay Note  [facility]     Patient Name: Kendrick Crystal  MRN: [id number]  Today's Date: 8/19/2019    Admit Date: 8/17/2019    Discharge Plan     Row Name 08/19/19 0956       Plan    Plan Comments  Spoke with Stephanie at [facility].  They had not yet admitted pt prior to hospital readmission.  CM will make a new referral upon DC if appropriate.          Discharge Codes    No documentation.       Expected Discharge Date and Time     Expected Discharge Date Expected Discharge Time    Aug 23, 2019             Christelle Carl

## 2019-08-19 NOTE — CONSULTS
Kendrick Crystal  1970  [id number]    Date of Consult: 2019      Requesting Provider: No ref. provider found  Evaluating Physician: Usman Dong MD    Chief Complaint: dry heaves    Reason for Consultation: perirectal abscess    History of present illness:    Patient is a 48 y.o.  Yr old male with history of poorly contolled T2DM, DUNLAP/liver cirrhosis, HTN, PVD/Left BKA, and multiple skin abscesses/MRSA who presented to [facility] ED with right shin wound infection summer 2018.  He was unable to get to see Dr. Martinez as his father passed away unexpectedly on 18 and he had to wait until after the .  The wound worsened becoming gangrenous and he came to [facility] ED in 2018.  He also had been hospital[facility] and then transferred to  for a severe penis/scrotum infection requiring surgical debridement in summer 2018.  He received antibiotics regarding his right leg infection, generally improved over the remainder of summer 2018 but that area had not completely healed. He was admitted 2019 with acute left lower abdominal wall redness/swelling and pain, spontaneous drainage associated with fever/chills and uncontrolled blood sugars.   I&D requiring wide debridement , severe/deep infection and transferred to [facility] on May 3 with IV Zosyn/oral doxycycline. Cultures had lactobacillus and mixed gram-positive skin sherly including alpha strep, staph epidermidis and corynebacterium. Readmitted to [facility] May 18, 2019, increasing generalized edema ultimately transitioned to oral doxycycline and healed.     He presented to the emergency room 2019 with acute rectal pain that had begun ; this is associated with constipation for days, chills and nausea/dry heaving.  White blood cell count elevated, high hemoglobin A1c over 13, urinalysis with hematuria/pyuria and CT scan with 3 x 3 cm fluid collection anterior to the  "distal rectum and per discussion with Dr. Akbar/Jennifer, this is not in the prostate.  Colorectal surgery/urology saw him for consideration of surgical options/timing/threshold, etc..     8/2/19 I&Dper Dr aPyne perirectal abscess; patient reports that he had instrumented his rectum prior to hospitalization with enema     8/3/19 urinary retention overnight requiring in/out catheterization per patient.  Creat climb     8/4/19 further creat climb; childs placed and lisinopril stopped and d/w Dr Rubio;  ?obstruction initially associated with retention postop (1200 out with I/O cath postop per nursing) -v- contrast from CT -v- lisinopril/medication/vancomycin -v- relative hypotension -v- likely combination of factors with ATN; nephrology following; vancomycin trough high and vancomycin stopped empirically 8/3 although high trough potentially accumulation as a consequence of diminishing renal function associated with retention/contrast/pressure rather than cause.  Nonetheless, avoid for now; creatinine trending down.       8/7 in retrospect he remembers air in his urine at admit which has raised concern for possible fistula from urinary tract;  No fecaluria and culture from abscess is fecal sherly;  ?rectal-urethral fistula;  Dr Payne aware    Culture with E. coli/Klebsiella species and creatinine generally trended down, transitioned to oral antibiotics at discharge.    Readmitted August 17, 2019 with nausea/vomiting/dry heaves for 3 days.  In addition he had diminishing urine output and difficulty emptying his bladder per his report.  No overt dysuria and denies seeing any blood or pyuria.  No flank pain.    Childs catheter was placed and CT scan of the abdomen showed:   \"Previously seen fluid collection identified inferior to the prostate gland is more increased in density on today's examination. There is wall thickening again seen throughout the bladder. Findings again are concerning for cystitis.\" per radiology    No " headache photophobia or neck stiffness.  No shortness of breath cough or hemoptysis.  No diarrhea.  No hematochezia melena or hematemesis.  No skin rash.                 Past Medical History:   Diagnosis Date   • Abdominal wall cellulitis 5/20/2019   • JUAN (acute kidney injury) (CMS/HCC) 7/29/2018   • Arthritis    • Bipolar 1 disorder (CMS/HCC)    • Cellulitis of right anterior lower leg 7/29/2018   • Cirrhosis (CMS/HCC)    • Counseling for insulin pump    • Depression    • Diabetes mellitus (CMS/HCC)    • Encephalopathy, hepatic (CMS/HCC)    • H/O degenerative disc disease    • History of Lissa's gangrene     right leg/testicle   • Hyperlipemia    • Hypertension    • multiple Skin abscesses    • DUNLAP, bx showed stage IV fibrosis    • Neuropathy    • Peripheral vascular disease (CMS/HCC)    • Thrombophlebitis        Past Surgical History:   Procedure Laterality Date   • ABDOMINAL WALL ABSCESS INCISION AND DRAINAGE N/A 4/26/2019    Procedure: ABDOMINAL WALL DEBRIDEMENT;  Surgeon: Fadi Kern MD;  Location:  [facility];  Service: General   • AMPUTATION DIGIT Left 1/30/2017    Procedure: left fourth and fifth transmetatarsal toe amputation ;  Surgeon: Juancho Martinez MD;  Location:  [facility];  Service:    • BELOW KNEE AMPUTATION Left 3/2/2017    Procedure: AMPUTATION BELOW KNEE, SHMUEL;  Surgeon: Juancho Martinez MD;  Location:  [facility];  Service:    • ENDOSCOPY     • HEMORRHOIDECTOMY N/A 8/2/2019    Procedure: EXCISION AND DRAIN PERIRECTAL ABSCESS;  Surgeon: Mumtaz Payne MD;  Location:  [facility];  Service: General   • LEG SURGERY     • TESTICLE SURGERY Right     DEBRIDEMENT FROM GANGRENE   • TONSILLECTOMY  1975       Pediatric History   Patient Guardian Status   • Not on file     Other Topics Concern   • Not on file   Social History Narrative    Lives in [facility]       family history includes Arthritis in his father and mother; Diabetes in his brother and father; Heart attack in his father;  Hyperlipidemia in his father; Hypertension in his brother, father, and mother; Kidney disease in his mother; Mental illness in his sister; Obesity in his brother; Stroke in his mother.    No Known Allergies    Medication:  Current Facility-Administered Medications   Medication Dose Route Frequency Provider Last Rate Last Dose   • acetaminophen (TYLENOL) tablet 650 mg  650 mg Oral Q4H PRN Mami Luz APRN        Or   • acetaminophen (TYLENOL) 160 MG/5ML solution 650 mg  650 mg Oral Q4H PRN Mami Luz APRN        Or   • acetaminophen (TYLENOL) suppository 650 mg  650 mg Rectal Q4H PRN Mami Luz APRN       • amLODIPine (NORVASC) tablet 10 mg  10 mg Oral Q24H Mami Luz APRN   10 mg at 08/18/19 0850   • bisacodyl (DULCOLAX) EC tablet 5 mg  5 mg Oral Daily PRN Mami Luz APRJAIME       • dextrose (D50W) 25 g/ 50mL Intravenous Solution 25 g  25 g Intravenous Q15 Min PRN Mami Luz APRN       • dextrose (GLUTOSE) oral gel 15 g  15 g Oral Q15 Min PRN Mami Luz APRN       • docusate sodium (COLACE) capsule 100 mg  100 mg Oral BID Mami Luz APRN   100 mg at 08/18/19 0849   • DULoxetine (CYMBALTA) DR capsule 30 mg  30 mg Oral Daily Mami Luz APRN   30 mg at 08/18/19 0851   • famotidine (PEPCID) injection 20 mg  20 mg Intravenous Q12H Mami Luz APRN   20 mg at 08/18/19 2115   • glucagon (human recombinant) (GLUCAGEN DIAGNOSTIC) injection 1 mg  1 mg Subcutaneous PRN Mami Luz APRJAIME       • heparin (porcine) 5000 UNIT/ML injection 5,000 Units  5,000 Units Subcutaneous Q12H Shauna Reddy MD   5,000 Units at 08/18/19 2029   • hydrALAZINE (APRESOLINE) tablet 100 mg  100 mg Oral Q8H Mami Luz APRN   100 mg at 08/18/19 1618   • HYDROcodone-acetaminophen (NORCO)  MG per tablet 1 tablet  1 tablet Oral TID PRN Mami Luz APRN   1 tablet at 08/17/19 2007   • insulin detemir (LEVEMIR) injection 15 Units  15 Units Subcutaneous  Nightly Sukh, Mami R, APRN   15 Units at 08/18/19 2116   • insulin lispro (humaLOG) injection 0-7 Units  0-7 Units Subcutaneous 4x Daily With Meals & Nightly Sukh, Mami R, APRN   4 Units at 08/18/19 2128   • metoclopramide (REGLAN) injection 5 mg  5 mg Intravenous 4x Daily AC & at Bedtime Sukh Mami R, APRN   5 mg at 08/18/19 2029   • metoprolol tartrate (LOPRESSOR) tablet 100 mg  100 mg Oral Q12H Luz, Mami R, APRN   100 mg at 08/18/19 2029   • ondansetron (ZOFRAN) injection 4 mg  4 mg Intravenous Q6H PRN Sukh, Mami R, APRN   4 mg at 08/18/19 0849   • piperacillin-tazobactam (ZOSYN) 3.375 g in iso-osmotic dextrose 50 ml (premix)  3.375 g Intravenous Q8H Sukh, Mami R, APRN   3.375 g at 08/19/19 0001   • saccharomyces boulardii (FLORASTOR) capsule 250 mg  250 mg Oral BID Sukh, Mami R, APRN   250 mg at 08/18/19 2029   • sevelamer (RENVELA) packet 0.8 g  800 mg Oral TID With Meals Sukh Mami R, APRN   0.8 g at 08/18/19 1858   • sodium chloride 0.9 % flush 10 mL  10 mL Intravenous PRN Foster Beebe MD       • sodium chloride 0.9 % flush 3 mL  3 mL Intravenous Q12H Sukh, Mami R, APRN   3 mL at 08/18/19 2030   • sodium chloride 0.9 % flush 3-10 mL  3-10 mL Intravenous PRN Luz, Mami R, APRN       • sodium chloride 0.9 % with KCl 20 mEq/L infusion  75 mL/hr Intravenous Continuous Luz, Mami R, APRN 75 mL/hr at 08/17/19 2010 75 mL/hr at 08/17/19 2010   • tamsulosin (FLOMAX) 24 hr capsule 0.8 mg  0.8 mg Oral Nightly Sukh, Mami R, APRN   0.8 mg at 08/18/19 2029   • terazosin (HYTRIN) capsule 5 mg  5 mg Oral Nightly Mami Luz APRN   5 mg at 08/18/19 2029       Antibiotics:  Anti-Infectives (From admission, onward)    Ordered     Dose/Rate Route Frequency Start Stop    08/17/19 1850  piperacillin-tazobactam (ZOSYN) 3.375 g in iso-osmotic dextrose 50 ml (premix)     Ordering Provider:  Mami Luz APRN    3.375 g  over 4 Hours Intravenous Every 8  "Hours 08/18/19 0100 08/25/19 0058    08/17/19 1724  piperacillin-tazobactam (ZOSYN) 3.375 g in iso-osmotic dextrose 50 ml (premix)     Ordering Provider:  Foster Beebe MD    3.375 g Intravenous Once 08/17/19 1726 08/17/19 1827            Review of Systems    Constitutional-- No Fever, chills or sweats.  Appetite diminished with fatigue  Heent-- No new vision, hearing or throat complaints.  No epistaxis or oral sores.  Denies odynophagia or dysphagia.  No flashers, floaters or eye pain. No odynophagia or dysphagia. No headache, photophobia or neck stiffness.  CV-- No chest pain, palpitation or syncope  Resp-- No SOB/cough/Hemoptysis  GI- No  diarrhea.  No hematochezia, melena, or hematemesis. Denies jaundice or chronic liver disease.  -- No dysuria, hematuria, or flank pain.  Denies hesitancy, urgency or flank pain.  Lymph- no swollen lymph nodes in neck/axilla or groin.   Heme- No active bruising or bleeding; no Hx of DVT or PE.  MS-- no swelling or pain in the bones or joints of arms/legs.  No new back pain.  Neuro-- No acute focal weakness or numbness in the arms or legs.  No seizures.    Full 12 point review of systems reviewed and negative otherwise for acute complaints, except for above    Physical Exam: Nursing/chaperone present  Vital Signs   /91 (BP Location: Left arm, Patient Position: Lying)   Pulse 74   Temp 98.1 °F (36.7 °C) (Oral)   Resp 18   Ht 190.5 cm (75\")   Wt 105 kg (230 lb 9.6 oz)   SpO2 94%   BMI 28.82 kg/m²     GENERAL: Awake and alert, in no acute distress.   HEENT: Normocephalic, atraumatic.  PERRL. EOMI. No conjunctival injection. No icterus. Oropharynx clear without evidence of thrush or exudate. No evidence of peridontal disease.    NECK: Supple without nuchal rigidity. No mass.  LYMPH: No cervical, axillary or inguinal lymphadenopathy.  HEART: RRR; No murmur, rubs, gallops.   LUNGS: Diminished at bases bilaterally without wheezing, rales, rhonchi. Normal respiratory " effort. Nonlabored. No dullness.  ABDOMEN: Soft, nontender, nondistended. Positive bowel sounds. No rebound or guarding. NO mass or HSM.  EXT:  No cyanosis, clubbing or edema. No cord.  : Genitalia generally unremarkable.  + Walker catheter.  MSK: FROM without joint effusions noted arms/legs.    SKIN: Warm and dry without cutaneous eruptions on Inspection/palpation.    NEURO: Oriented to PPT. No focal deficits on motor/sensory exam at arms/legs.  PSYCHIATRIC: Normal insight and judgement. Cooperative with PE    Amputee status noted with left BKA    No peripheral stigmata/phenomena of endocarditis    Laboratory Data    Results from last 7 days   Lab Units 08/19/19  0531 08/17/19  1625   WBC 10*3/mm3 12.76* 16.28*   HEMOGLOBIN g/dL 11.6* 14.0   HEMATOCRIT % 36.4* 42.9   PLATELETS 10*3/mm3 227 305     Results from last 7 days   Lab Units 08/19/19  0531   SODIUM mmol/L 134*   POTASSIUM mmol/L 3.5   CHLORIDE mmol/L 95*   CO2 mmol/L 23.0   BUN mg/dL 35*   CREATININE mg/dL 1.93*   GLUCOSE mg/dL 278*   CALCIUM mg/dL 7.6*     Results from last 7 days   Lab Units 08/17/19  1625   ALK PHOS U/L 168*   BILIRUBIN mg/dL 0.4   ALT (SGPT) U/L 17   AST (SGOT) U/L 35         Results from last 7 days   Lab Units 08/19/19  0531   CRP mg/dL 0.62*       Estimated Creatinine Clearance: 60 mL/min (A) (by C-G formula based on SCr of 1.93 mg/dL (H)).      Microbiology:      Radiology:  Imaging Results (last 72 hours)     Procedure Component Value Units Date/Time    CT Abdomen Pelvis Without Contrast [[id number]] Collected:  08/17/19 1848     Updated:  08/18/19 0910    Narrative:       EXAMINATION: CT ABDOMEN/PELVIS WO CONTRAST - 08/17/2019     INDICATION:  R53.1-Weakness; D72.829-Elevated white blood cell count,  unspecified; Z78.9-Other specified health status. Weakness, abdominal  pain.     TECHNIQUE: Multiple axial CT imaging is obtained of the abdomen and  pelvis without the administration of oral or intravenous contrast.     The  radiation dose reduction device was turned on for each scan per the  ALARA (As Low as Reasonably Achievable) protocol.     COMPARISON: 07/30/2019     FINDINGS:      ABDOMEN: The lung bases are grossly clear. The liver is homogeneous in  appearance. No stones in the gallbladder. The spleen is unremarkable.  Pancreas is within normal limits. No abdominal retroperitoneal  lymphadenopathy. The kidneys and adrenal glands are stable with  bilateral myelolipomas present. No free fluid or free air. The appendix  is radiographically normal in appearance. No abnormal mass or fluid  collections identified. The abdominal portion of the gastrointestinal  tract is within normal limits.     PELVIS: The pelvic organs reveal abnormal wall thickening of the  bladder. There is incomplete distention. Cystitis cannot be excluded.  There is stool seen within the rectum. No definite abscess collection  identified on today's examination. The pelvic portion of the  gastrointestinal tract is within normal limits. No free fluid or free  air. No abnormal mass or fluid collections identified. Previously seen  fluid collection inferior to the prostate is more increased in density  on today's examination. No definite abnormal fluid collection  identified.       Impression:       Previously seen fluid collection identified inferior to the  prostate gland is more increased in density on today's examination.  There is wall thickening again seen throughout the bladder. Findings  again are concerning for cystitis. Cystography can be performed for  further evaluation. Remainder of the CT abdomen and pelvis is stable.     DICTATED:   08/17/2019  EDITED/ls :   08/17/2019         This report was finalized on 8/18/2019 9:44 AM by Dr. Nella Enriquez MD.       XR Chest 1 View [[id number]] Collected:  08/17/19 1842     Updated:  08/18/19 0947    Narrative:          EXAMINATION: XR CHEST, SINGLE VIEW - 08/17/2019     INDICATION:  R53.1-Weakness;  D72.829-Elevated white blood cell count,  unspecified; Z78.9-Other specified health status.      COMPARISON: None.     FINDINGS: Portable chest reveals cardiac and mediastinal silhouettes  within normal limits. Bony structures are unremarkable. No pleural  effusion or pneumothorax. No focal parenchymal opacification present.          Impression:       No acute cardiopulmonary disease.     DICTATED:   08/17/2019  EDITED/ls :   08/17/2019      This report was finalized on 8/18/2019 9:44 AM by Dr. Nella Enriquez MD.       CT Abdomen Pelvis Without Contrast [[id number]] Collected:  08/18/19 0331     Updated:  08/18/19 0333    Narrative:       CT Abdomen Pelvis WO    INDICATION:   Abdominal pain and elevated white count    TECHNIQUE:   CT of the abdomen and pelvis without contrast. Coronal and sagittal reconstructions were obtained.  Radiation dose reduction techniques included automated exposure control or exposure modulation based on body size. Radiation audit for number of CT and  nuclear cardiology exams performed in the last year: 6.      COMPARISON:   8/17/2019 at 6:32 PM    FINDINGS:  Included lung bases are clear. The heart size is the upper limits of normal.    Abdomen: Noncontrasted imaging of the liver, gallbladder, spleen, pancreas and kidneys are within normal limits. Redemonstrated are bilateral adrenal nodules. The aorta is within normal limits. No threshold retroperitoneal adenopathy.    Pelvis: The bowel pattern is nonobstructive. No free air. No focal inflammatory change. Walker catheter demonstrated in the bladder. No threshold pelvic or inguinal adenopathy.    Regional osseous structures show no acute or aggressive bone lesions. Degenerative changes in the lower lumbar spine.      Impression:         1.  Interval placement of Walker catheter in bladder from prior study.  2.  Bilateral adrenal nodules which are stable from prior study.          Signer Name: Shane Watts MD   Signed: 8/18/2019 3:31  AM   Workstation Name: [id number]    [facility]            Impression:     --Acute nausea/vomiting/dry heaves over 3 days preceding admission and sought medical attention August 17 per family.  Likely acute dehydration.  Unclear at present if this relates to UTI versus other intra-abdominal process versus medication or other.  Currently abdominal exam is benign, no further vomiting today.  Rehydration/nutrition per medicine at their discretion    --Acute perirectal abscess ; Colorectal surgery, Dr. Payne following to help guide timing/options/threshold for further drainage if needed.  Drainage as above on August 2 ;  Mixed Fecal sherly in culture; CT scan as above     --History urinary retention.  Medicine following to help guide further work-up, urology input, etc.     --ARF postop;  ?obstruction initially associated with retention postop (1200 out with I/O cath postop per nursing) -v- contrast from CT -v- lisinopril/medication/vancomycin -v- relative hypotension -v- likely combination of factors with ATN;  vancomycin trough high and vancomycin stopped empirically 8/3 although high trough potentially accumulation as a consequence of diminishing renal function associated with retention/contrast/pressure rather than cause.  Nonetheless, avoid for now; likely further acute kidney injury at admission August 17 associated with dehydration/prerenal/ATN etiology     --History MRSA;  Not in current culture     --Diabetes mellitus type 2, uncontrolled.  He reports the reason for this is forgetfulness     --History Johnston and chronic liver disease per past notes     --Left BKA     --Peripheral vascular disease       PLAN: Thank you for asking us to see Kendrick Crystal, I recommend the following:      --IV zosyn    --I discussed potential risks and benefits of the prescribed antibiotics that include, but are not limited to, solid organ toxicity,CDiff, cytopenias, hypersensitivity,  etc.. Patient/Family  voice understanding and agree to proceed.     --Check/review labs cultures and scans     --Discussed with microbiology     --History per nursing staff     --Discussed with Nephroogy     --Discussed with family, partial history per them     --Highly complex set of issues with high risk for further serious morbidity and other serious sequela    --Colorectal surgery following to help guide potential for further intervention related to perirectal abscess versus further work-up versus observation or other           Usman Dong MD  8/19/2019

## 2019-08-19 NOTE — SIGNIFICANT NOTE
"Pt's wife approached RN in [facility] stating, \"somebody better call the doctor he's wanting to leave.\" Patient stated, \"I'm tired of being treated like a baby.\" Pt did not give any other reasons for wanting to leave AMA. RN explained the need for close monitoring in hospital setting, IV abx, IVF, and FC still in place. Pt stated, \"I'm calling my ride. \" Attending paged, PRNs ordered for anxiety/agitation. Pt was offered PRNs and refused. Charge nurse spoke with pt and pt's wife regarding leaving AMA. Attending paged again to come discuss with patient and patient's spouse at the bedside regarding leaving AMA. Pt was found resting in bed comfortably. Tele was DC'd per pt request.   "

## 2019-08-19 NOTE — PLAN OF CARE
Problem: Patient Care Overview  Goal: Plan of Care Review  Outcome: Ongoing (interventions implemented as appropriate)   08/19/19 8391   Coping/Psychosocial   Plan of Care Reviewed With patient;spouse   Plan of Care Review   Progress no change   OTHER   Outcome Summary Pt wanted to leave AMA earlier on shift. MD Aware and PRNs ordered. Pt given PRN for anxiety/agitation. Discussed with wife. IVF dc'd and telemetry can be dc'd. FC still in place. Pt has been resting comfortably since PRN given however does not acknowledge nrsg staff with rounding..  VSS. Will cont to monitor.

## 2019-08-19 NOTE — PROGRESS NOTES
[facility] Medicine Services  INPATIENT PROGRESS NOTE    Date of Admission: 8/17/2019  Length of Stay: 2  Primary Care Physician: Keyanna Leone APRN    Subjective     Chief Complaint: Nausea    HPI:  Agitated, delusional today  Urine clearing    Review Of Systems:   Patient denies headaches, fever, chills, shortness of breath, chest pain, cough, abdominal pain, nausea or vomiting, diarrhea, rash, itching or bleeding      Objective      Vitals:   98.1 °F (36.7 °C) Temp  Min: 98 °F (36.7 °C)  Max: 98.5 °F (36.9 °C)    138/88 BP  Min: 132/70  Max: 179/98    76 Pulse  Min: 72  Max: 84    18 Resp  Min: 18  Max: 18    94 % No Data Recorded    room air       No Data Recorded     Patient is alert and talkative in no distress at rest-sitting on the edge of the bed  Neck is without mass or JVD  Heart is Reg wo murmur  Lungs are clear wo wheeze or crackle  Abd is soft without HSM or mass, not distended or tender to palpation  MAEW left BKA  Skin is without rash  Neurologic exam is nonfocal   Mood is appropriate      Results Review:    I have reviewed the labs, radiology results and diagnostic studies.    Results from last 7 days   Lab Units 08/19/19  0531 08/17/19  1625   WBC 10*3/mm3 12.76* 16.28*   HEMOGLOBIN g/dL 11.6* 14.0   HEMATOCRIT % 36.4* 42.9   PLATELETS 10*3/mm3 227 305     Results from last 7 days   Lab Units 08/19/19  0531 08/17/19  1625 08/14/19  0642 08/13/19  0410   SODIUM mmol/L 134* 137 139 141  139   POTASSIUM mmol/L 3.5 4.5 3.5 3.9  3.9   CHLORIDE mmol/L 95* 90* 96* 100  99   CO2 mmol/L 23.0 29.0 26.0 21.0*  25.0   BUN mg/dL 35* 35* 44* 50*  49*   CREATININE mg/dL 1.93* 1.91* 2.58* 3.00*  3.24*   GLUCOSE mg/dL 278* 296* 234* 193*  195*   CALCIUM mg/dL 7.6* 9.0 8.4* 8.4*  8.1*   ALT (SGPT) U/L  --  17  --  12   AST (SGOT) U/L  --  35  --  16       Microbiology Results Abnormal     Procedure Component Value - Date/Time    Urine Culture - Urine, Urine, Catheter [[id number]]   (Normal) Collected:  08/17/19 1729    Lab Status:  Final result Specimen:  Urine, Catheter Updated:  08/19/19 0932     Urine Culture No growth    Blood Culture - Blood, Arm, Left [[id number]] Collected:  08/17/19 1735    Lab Status:  Preliminary result Specimen:  Blood from Arm, Left Updated:  08/18/19 1800     Blood Culture No growth at 24 hours    Blood Culture - Blood, Hand, Left [[id number]] Collected:  08/17/19 1745    Lab Status:  Preliminary result Specimen:  Blood from Hand, Left Updated:  08/18/19 1800     Blood Culture No growth at 24 hours        Ct Abdomen Pelvis Without Contrast    Result Date: 8/18/2019  Previously seen fluid collection identified inferior to the prostate gland is more increased in density on today's examination. There is wall thickening again seen throughout the bladder. Findings again are concerning for cystitis. Cystography can be performed for further evaluation. Remainder of the CT abdomen and pelvis is stable.  DICTATED:   08/17/2019 EDITED/ls :   08/17/2019   This report was finalized on 8/18/2019 9:44 AM by Dr. Nella Enriquez MD.      Ct Abdomen Pelvis Without Contrast    Result Date: 8/18/2019  1.  Interval placement of Walker catheter in bladder from prior study. 2.  Bilateral adrenal nodules which are stable from prior study. Signer Name: Shane Watts MD  Signed: 8/18/2019 3:31 AM  Workstation Name: [id number]  [facility]    Xr Chest 1 View    Result Date: 8/18/2019  No acute cardiopulmonary disease.  DICTATED:   08/17/2019 EDITED/ls :   08/17/2019  This report was finalized on 8/18/2019 9:44 AM by Dr. Nella Enriquez MD.      Results for orders placed during the hospital encounter of 05/18/19   Adult Transthoracic Echo Complete W/ Cont if Necessary Per Protocol    Narrative · Estimated EF = 60%.  · Mild mitral valve regurgitation is present  · Mild tricuspid valve regurgitation is present.  · Calculated right ventricular systolic pressure  from tricuspid   regurgitation is 29 mmHg.          I have reviewed the medications.    Assessment/Plan     Assessment/Problem List    Intractable nausea and vomiting    Perirectal abscess I&D 8/2/19    DM (diabetes mellitus) type II uncontrolled, periph vascular disorder (CMS/HCC)    DUNLAP Cirrhosis    Essential hypertension    S/P BKA (below knee amputation), left (CMS/HCC)    Obesity (BMI 30-39.9)    SIRS (systemic inflammatory response syndrome) (CMS/HCC)    Dehydration    Gastroparesis    Leukocytosis      Plan  Kendrick Crystal is a 48 y.o. male with uncontrolled 2 diabetes recently admitted from 7/30/2019 until 8/14/2019 with sepsis, perirectal rectal abscess as well as rectal fistula that underwent her operative drainage on 8/2/2019 with Dr. Mensah.  Cultures returned positive for E. coli and Klebsiella and was treated with IV antibiotics per Dr. Carolina.  He was discharged on 8/14/2019 with oral Ceftin and Flagyl.   --Continue aggressive IV hydration --Lactic acidosis resolved    --Appreciate Dr. Payne for possible repeat surgical intervention or drainage.  --Renal insufficiency actually improved from few days ago  -- Monitor agitation-- stop scheduled reglan  -- Increased levemir and added preparndial insulin 8/19      DVT prophylaxis: Heparin subcu  Discharge Planning: To be determined.    Electronically signed by Shauna Reddy MD, 08/19/19 11:31 AM .

## 2019-08-19 NOTE — PLAN OF CARE
Problem: Patient Care Overview  Goal: Plan of Care Review  Outcome: Ongoing (interventions implemented as appropriate)   08/19/19 0148   Coping/Psychosocial   Plan of Care Reviewed With patient   OTHER   Outcome Summary pt resting in bed , multiple sticks for IV       Problem: Pain, Chronic (Adult)  Goal: Identify Related Risk Factors and Signs and Symptoms  Outcome: Ongoing (interventions implemented as appropriate)      Problem: Skin Injury Risk (Adult)  Goal: Identify Related Risk Factors and Signs and Symptoms  Outcome: Ongoing (interventions implemented as appropriate)

## 2019-08-19 NOTE — PROGRESS NOTES
"Colon and Rectal [CSGA]    Follow-up rectal abscess with UTI (Candida)    /81 (BP Location: Right arm, Patient Position: Lying)   Pulse 72   Temp 98.1 °F (36.7 °C) (Oral)   Resp 16   Ht 190.5 cm (75\")   Wt 105 kg (230 lb 9.6 oz)   SpO2 94%   BMI 28.82 kg/m²     Lab Results (last 24 hours)     Procedure Component Value Units Date/Time    Urine Culture - Urine, Urine, Clean Catch [[id number]]  (Abnormal) Collected:  08/18/19 0407    Specimen:  Urine, Clean Catch Updated:  08/19/19 1401     Urine Culture >100,000 CFU/mL Yeast, Not Candida albicans    POC Glucose Once [[id number]]  (Abnormal) Collected:  08/19/19 1054    Specimen:  Blood Updated:  08/19/19 1106     Glucose 275 mg/dL     Urine Culture - Urine, Urine, Catheter [[id number]]  (Normal) Collected:  08/17/19 1729    Specimen:  Urine, Catheter Updated:  08/19/19 0932     Urine Culture No growth    POC Glucose Once [[id number]]  (Abnormal) Collected:  08/19/19 0753    Specimen:  Blood Updated:  08/19/19 0754     Glucose 275 mg/dL     Basic Metabolic Panel [[id number]]  (Abnormal) Collected:  08/19/19 0531    Specimen:  Blood Updated:  08/19/19 0645     Glucose 278 mg/dL      BUN 35 mg/dL      Creatinine 1.93 mg/dL      Sodium 134 mmol/L      Potassium 3.5 mmol/L      Chloride 95 mmol/L      CO2 23.0 mmol/L      Calcium 7.6 mg/dL      eGFR Non African Amer 37 mL/min/1.73      BUN/Creatinine Ratio 18.1     Anion Gap 16.0 mmol/L     Narrative:       GFR Normal >60  Chronic Kidney Disease <60  Kidney Failure <15    C-reactive Protein [[id number]]  (Abnormal) Collected:  08/19/19 0531    Specimen:  Blood Updated:  08/19/19 0644     C-Reactive Protein 0.62 mg/dL     CBC & Differential [[id number]] Collected:  08/19/19 0531    Specimen:  Blood Updated:  08/19/19 0606    Narrative:       The following orders were created for panel order CBC & Differential.  Procedure                               Abnormality         Status                     ---------             "                   -----------         ------                     CBC Auto Differential[[id number]]        Abnormal            Final result                 Please view results for these tests on the individual orders.    CBC Auto Differential [[id number]]  (Abnormal) Collected:  08/19/19 0531    Specimen:  Blood Updated:  08/19/19 0606     WBC 12.76 10*3/mm3      RBC 4.16 10*6/mm3      Hemoglobin 11.6 g/dL      Hematocrit 36.4 %      MCV 87.5 fL      MCH 27.9 pg      MCHC 31.9 g/dL      RDW 15.0 %      RDW-SD 47.7 fl      MPV 12.6 fL      Platelets 227 10*3/mm3      Neutrophil % 71.7 %      Lymphocyte % 17.8 %      Monocyte % 7.4 %      Eosinophil % 1.8 %      Basophil % 0.9 %      Immature Grans % 0.4 %      Neutrophils, Absolute 9.16 10*3/mm3      Lymphocytes, Absolute 2.27 10*3/mm3      Monocytes, Absolute 0.94 10*3/mm3      Eosinophils, Absolute 0.23 10*3/mm3      Basophils, Absolute 0.11 10*3/mm3      Immature Grans, Absolute 0.05 10*3/mm3      nRBC 0.0 /100 WBC     POC Glucose Once [[id number]]  (Abnormal) Collected:  08/18/19 2118    Specimen:  Blood Updated:  08/18/19 2121     Glucose 263 mg/dL     Blood Culture - Blood, Arm, Left [[id number]] Collected:  08/17/19 1735    Specimen:  Blood from Arm, Left Updated:  08/18/19 1800     Blood Culture No growth at 24 hours    Blood Culture - Blood, Hand, Left [[id number]] Collected:  08/17/19 1745    Specimen:  Blood from Hand, Left Updated:  08/18/19 1800     Blood Culture No growth at 24 hours    POC Glucose Once [[id number]]  (Abnormal) Collected:  08/18/19 1627    Specimen:  Blood Updated:  08/18/19 1629     Glucose 235 mg/dL           I/O this shift:  In: 1010 [P.O.:960; IV Piggyback:50]  Out: -     Patient's family said he has been irritable all day finally laid on his right side and fell asleep and now he is making audible sounds that are not snoring.  He wants to go home and is willing to sign out AMA but is clear that he cannot take care of himself and his family  knows they cannot.  He is unwilling to take care of his own blood sugars and he continues to have complications from this.    He has Candida in his urine and were not sure what is going on with his rectal abscess although I could not feel one yesterday.  He has no anal or rectal pain and GI function is good.    Impression: Difficult to tell but possibly ongoing rectal abscess possibly contributing to his UTI  Urology is following    Recommendation: Continued inpatient IV antibiotics and sugar controls.  He still has an indwelling Walker.    Kendrick Sunshine MD  08/19/19  3:57 PM

## 2019-08-20 LAB
ANION GAP SERPL CALCULATED.3IONS-SCNC: 12 MMOL/L (ref 5–15)
BUN BLD-MCNC: 26 MG/DL (ref 6–20)
BUN/CREAT SERPL: 16.3 (ref 7–25)
CALCIUM SPEC-SCNC: 8.6 MG/DL (ref 8.6–10.5)
CHLORIDE SERPL-SCNC: 102 MMOL/L (ref 98–107)
CO2 SERPL-SCNC: 29 MMOL/L (ref 22–29)
CREAT BLD-MCNC: 1.6 MG/DL (ref 0.76–1.27)
DEPRECATED RDW RBC AUTO: 46.1 FL (ref 37–54)
ERYTHROCYTE [DISTWIDTH] IN BLOOD BY AUTOMATED COUNT: 15 % (ref 12.3–15.4)
GFR SERPL CREATININE-BSD FRML MDRD: 46 ML/MIN/1.73
GLUCOSE BLD-MCNC: 183 MG/DL (ref 65–99)
GLUCOSE BLDC GLUCOMTR-MCNC: 136 MG/DL (ref 70–130)
GLUCOSE BLDC GLUCOMTR-MCNC: 150 MG/DL (ref 70–130)
GLUCOSE BLDC GLUCOMTR-MCNC: 156 MG/DL (ref 70–130)
GLUCOSE BLDC GLUCOMTR-MCNC: 173 MG/DL (ref 70–130)
HCT VFR BLD AUTO: 36.6 % (ref 37.5–51)
HGB BLD-MCNC: 12.2 G/DL (ref 13–17.7)
MCH RBC QN AUTO: 28.2 PG (ref 26.6–33)
MCHC RBC AUTO-ENTMCNC: 33.3 G/DL (ref 31.5–35.7)
MCV RBC AUTO: 84.5 FL (ref 79–97)
PLATELET # BLD AUTO: 213 10*3/MM3 (ref 140–450)
PMV BLD AUTO: 12.1 FL (ref 6–12)
POTASSIUM BLD-SCNC: 3.2 MMOL/L (ref 3.5–5.2)
RBC # BLD AUTO: 4.33 10*6/MM3 (ref 4.14–5.8)
SODIUM BLD-SCNC: 143 MMOL/L (ref 136–145)
WBC NRBC COR # BLD: 12.85 10*3/MM3 (ref 3.4–10.8)

## 2019-08-20 PROCEDURE — 25010000002 PIPERACILLIN SOD-TAZOBACTAM PER 1 G: Performed by: NURSE PRACTITIONER

## 2019-08-20 PROCEDURE — 25010000002 HEPARIN (PORCINE) PER 1000 UNITS: Performed by: INTERNAL MEDICINE

## 2019-08-20 PROCEDURE — 85027 COMPLETE CBC AUTOMATED: CPT | Performed by: HOSPITALIST

## 2019-08-20 PROCEDURE — 99233 SBSQ HOSP IP/OBS HIGH 50: CPT | Performed by: HOSPITALIST

## 2019-08-20 PROCEDURE — 82962 GLUCOSE BLOOD TEST: CPT

## 2019-08-20 PROCEDURE — 63710000001 INSULIN LISPRO (HUMAN) PER 5 UNITS: Performed by: NURSE PRACTITIONER

## 2019-08-20 PROCEDURE — 63710000001 INSULIN DETEMIR PER 5 UNITS: Performed by: INTERNAL MEDICINE

## 2019-08-20 PROCEDURE — 80048 BASIC METABOLIC PNL TOTAL CA: CPT | Performed by: HOSPITALIST

## 2019-08-20 RX ORDER — POTASSIUM CHLORIDE 750 MG/1
40 CAPSULE, EXTENDED RELEASE ORAL ONCE
Status: COMPLETED | OUTPATIENT
Start: 2019-08-20 | End: 2019-08-20

## 2019-08-20 RX ADMIN — DULOXETINE HYDROCHLORIDE 30 MG: 30 CAPSULE, DELAYED RELEASE ORAL at 09:27

## 2019-08-20 RX ADMIN — INSULIN LISPRO 2 UNITS: 100 INJECTION, SOLUTION INTRAVENOUS; SUBCUTANEOUS at 12:54

## 2019-08-20 RX ADMIN — INSULIN LISPRO 5 UNITS: 100 INJECTION, SOLUTION INTRAVENOUS; SUBCUTANEOUS at 09:28

## 2019-08-20 RX ADMIN — POTASSIUM CHLORIDE 40 MEQ: 750 CAPSULE, EXTENDED RELEASE ORAL at 16:11

## 2019-08-20 RX ADMIN — TAZOBACTAM SODIUM AND PIPERACILLIN SODIUM 3.38 G: 375; 3 INJECTION, SOLUTION INTRAVENOUS at 17:20

## 2019-08-20 RX ADMIN — INSULIN DETEMIR 15 UNITS: 100 INJECTION, SOLUTION SUBCUTANEOUS at 20:51

## 2019-08-20 RX ADMIN — TAZOBACTAM SODIUM AND PIPERACILLIN SODIUM 3.38 G: 375; 3 INJECTION, SOLUTION INTRAVENOUS at 01:53

## 2019-08-20 RX ADMIN — SEVELAMER CARBONATE 0.8 G: 0.8 POWDER, FOR SUSPENSION ORAL at 12:54

## 2019-08-20 RX ADMIN — HYDRALAZINE HYDROCHLORIDE 100 MG: 50 TABLET, FILM COATED ORAL at 16:10

## 2019-08-20 RX ADMIN — INSULIN LISPRO 5 UNITS: 100 INJECTION, SOLUTION INTRAVENOUS; SUBCUTANEOUS at 12:54

## 2019-08-20 RX ADMIN — TAZOBACTAM SODIUM AND PIPERACILLIN SODIUM 3.38 G: 375; 3 INJECTION, SOLUTION INTRAVENOUS at 11:08

## 2019-08-20 RX ADMIN — INSULIN DETEMIR 15 UNITS: 100 INJECTION, SOLUTION SUBCUTANEOUS at 09:29

## 2019-08-20 RX ADMIN — INSULIN LISPRO 2 UNITS: 100 INJECTION, SOLUTION INTRAVENOUS; SUBCUTANEOUS at 20:50

## 2019-08-20 RX ADMIN — HEPARIN SODIUM 5000 UNITS: 5000 INJECTION INTRAVENOUS; SUBCUTANEOUS at 20:57

## 2019-08-20 RX ADMIN — MICAFUNGIN SODIUM 100 MG: 20 INJECTION, POWDER, LYOPHILIZED, FOR SOLUTION INTRAVENOUS at 09:26

## 2019-08-20 RX ADMIN — FAMOTIDINE 20 MG: 10 INJECTION, SOLUTION INTRAVENOUS at 09:27

## 2019-08-20 RX ADMIN — INSULIN LISPRO 2 UNITS: 100 INJECTION, SOLUTION INTRAVENOUS; SUBCUTANEOUS at 09:28

## 2019-08-20 RX ADMIN — AMLODIPINE BESYLATE 10 MG: 10 TABLET ORAL at 09:27

## 2019-08-20 RX ADMIN — METOPROLOL TARTRATE 100 MG: 50 TABLET ORAL at 09:27

## 2019-08-20 RX ADMIN — SODIUM CHLORIDE, PRESERVATIVE FREE 10 ML: 5 INJECTION INTRAVENOUS at 20:43

## 2019-08-20 RX ADMIN — HEPARIN SODIUM 5000 UNITS: 5000 INJECTION INTRAVENOUS; SUBCUTANEOUS at 09:27

## 2019-08-20 RX ADMIN — SEVELAMER CARBONATE 0.8 G: 0.8 POWDER, FOR SUSPENSION ORAL at 17:20

## 2019-08-20 RX ADMIN — HYDRALAZINE HYDROCHLORIDE 100 MG: 50 TABLET, FILM COATED ORAL at 06:40

## 2019-08-20 RX ADMIN — SODIUM CHLORIDE, PRESERVATIVE FREE 3 ML: 5 INJECTION INTRAVENOUS at 09:29

## 2019-08-20 RX ADMIN — DOCUSATE SODIUM 100 MG: 100 CAPSULE, LIQUID FILLED ORAL at 09:27

## 2019-08-20 RX ADMIN — Medication 250 MG: at 09:26

## 2019-08-20 RX ADMIN — SEVELAMER CARBONATE 0.8 G: 0.8 POWDER, FOR SUSPENSION ORAL at 09:29

## 2019-08-20 NOTE — PROGRESS NOTES
"Colon and Rectal [CSGA]    Follow-up pelvic abscess  UTI with Candida    /70 (BP Location: Left arm, Patient Position: Lying)   Pulse 75   Temp 97.5 °F (36.4 °C) (Oral)   Resp 18   Ht 190.5 cm (75\")   Wt 105 kg (230 lb 9.6 oz)   SpO2 94%   BMI 28.82 kg/m²     Lab Results (last 24 hours)     Procedure Component Value Units Date/Time    POC Glucose Once [[id number]]  (Abnormal) Collected:  08/20/19 1944    Specimen:  Blood Updated:  08/20/19 1946     Glucose 150 mg/dL     Blood Culture - Blood, Arm, Left [[id number]] Collected:  08/17/19 1735    Specimen:  Blood from Arm, Left Updated:  08/20/19 1800     Blood Culture No growth at 3 days    Blood Culture - Blood, Hand, Left [[id number]] Collected:  08/17/19 1745    Specimen:  Blood from Hand, Left Updated:  08/20/19 1800     Blood Culture No growth at 3 days    POC Glucose Once [[id number]]  (Abnormal) Collected:  08/20/19 1649    Specimen:  Blood Updated:  08/20/19 1653     Glucose 136 mg/dL     POC Glucose Once [[id number]]  (Abnormal) Collected:  08/20/19 1121    Specimen:  Blood Updated:  08/20/19 1123     Glucose 173 mg/dL     Basic Metabolic Panel [[id number]]  (Abnormal) Collected:  08/20/19 1017    Specimen:  Blood Updated:  08/20/19 1110     Glucose 183 mg/dL      BUN 26 mg/dL      Creatinine 1.60 mg/dL      Sodium 143 mmol/L      Potassium 3.2 mmol/L      Chloride 102 mmol/L      CO2 29.0 mmol/L      Calcium 8.6 mg/dL      eGFR Non African Amer 46 mL/min/1.73      BUN/Creatinine Ratio 16.3     Anion Gap 12.0 mmol/L     Narrative:       GFR Normal >60  Chronic Kidney Disease <60  Kidney Failure <15    CBC (No Diff) [[id number]]  (Abnormal) Collected:  08/20/19 0943    Specimen:  Blood Updated:  08/20/19 1021     WBC 12.85 10*3/mm3      RBC 4.33 10*6/mm3      Hemoglobin 12.2 g/dL      Hematocrit 36.6 %      MCV 84.5 fL      MCH 28.2 pg      MCHC 33.3 g/dL      RDW 15.0 %      RDW-SD 46.1 fl      MPV 12.1 fL      Platelets 213 10*3/mm3     POC Glucose " Once [[id number]]  (Abnormal) Collected:  08/20/19 0808    Specimen:  Blood Updated:  08/20/19 0819     Glucose 156 mg/dL     POC Glucose Once [[id number]]  (Normal) Collected:  08/19/19 2001    Specimen:  Blood Updated:  08/19/19 2003     Glucose 121 mg/dL           No intake/output data recorded.    More sedate than yesterday.  No nausea or vomiting.  Good pain control  No rectal pain or difficulty evacuating stool.  May need an EUA with drainage of the abscess.  Hopefully will respond to antibiotics first.  Patient reluctant to get up and wants the sling for every bowel activity.  I asked him to get up to go the bathroom he says his leg is too weak and I told him the only way to get the leg stronger is to use it.  He was not happy with this.  Dr. Payne is back tomorrow.    Kendrick Sunshine MD  08/20/19  7:47 PM

## 2019-08-20 NOTE — NURSING NOTE
"Patient requested to transfer to the BSC via lift. RN and nurse aid encouraged patient to attempt to transfer with an assist of 2 as he was able to multiple times yesterday, but patient adamantly refused. He stated \"you will lift me on that bedside commode or I will just shit in the bed and leave AMA\". Charge nurse and unit director all spoke with patient and educated on the risks of immobility and explained that the patient could become weaker overtime and less functional. Physician notified about wishes to leave AMA. Ultimately, patient placed on BSC via lift. Patient still refusing to work with therapy or transfer without lift assistance.   "

## 2019-08-20 NOTE — PROGRESS NOTES
[facility] Progress Note        Antibiotics:  Anti-Infectives (From admission, onward)    Ordered     Dose/Rate Route Frequency Start Stop    19 0735  micafungin 100 mg/100 mL 0.9% NS IVPB (mbp)     Ordering Provider:  Usman Dong MD    100 mg  over 60 Minutes Intravenous Every 24 Hours 19 0900 19 0859    19 1850  piperacillin-tazobactam (ZOSYN) 3.375 g in iso-osmotic dextrose 50 ml (premix)     Ordering Provider:  aMmi Luz APRN    3.375 g  over 4 Hours Intravenous Every 8 Hours 19 0100 19 0058    19 1724  piperacillin-tazobactam (ZOSYN) 3.375 g in iso-osmotic dextrose 50 ml (premix)     Ordering Provider:  Foster Beebe MD    3.375 g Intravenous Once 19 1726 19 1827          CC: N/V    HPI:    Patient is a 48 y.o.  Yr old male with history of poorly contolled T2DM, DUNLAP/liver cirrhosis, HTN, PVD/Left BKA, and multiple skin abscesses/MRSA who presented to [facility] ED with right shin wound infection summer 2018.  He was unable to get to see Dr. Martinez as his father passed away unexpectedly on 18 and he had to wait until after the .  The wound worsened becoming gangrenous and he came to [facility] ED in 2018.  He also had been hospitalized at [facility] and then transferred to  for a severe penis/scrotum infection requiring surgical debridement in summer 2018.  He received antibiotics regarding his right leg infection, generally improved over the remainder of summer 2018 but that area had not completely healed. He was admitted 2019 with acute left lower abdominal wall redness/swelling and pain, spontaneous drainage associated with fever/chills and uncontrolled blood sugars.   I&D requiring wide debridement , severe/deep infection and transferred to [facility] on May 3 with IV Zosyn/oral doxycycline. Cultures had lactobacillus and mixed gram-positive skin sherly including alpha strep, staph epidermidis and  corynebacterium. Readmitted to [facility] May 18, 2019, increasing generalized edema ultimately transitioned to oral doxycycline and healed.     He presented to the emergency room July 30, 2019 with acute rectal pain that had begun 7/27; this is associated with constipation for days, chills and nausea/dry heaving.  White blood cell count elevated, high hemoglobin A1c over 13, urinalysis with hematuria/pyuria and CT scan with 3 x 3 cm fluid collection anterior to the distal rectum and per discussion with Dr. Akbar/Jennifer, this is not in the prostate.  Colorectal surgery/urology saw him for consideration of surgical options/timing/threshold, etc..     8/2/19 I&Dper Dr Payne perirectal abscess; patient reports that he had instrumented his rectum prior to hospitalization with enema     8/3/19 urinary retention overnight requiring in/out catheterization per patient.  Creat climb     8/4/19 further creat climb; childs placed and lisinopril stopped and d/w Dr Rubio;  ?obstruction initially associated with retention postop (1200 out with I/O cath postop per nursing) -v- contrast from CT -v- lisinopril/medication/vancomycin -v- relative hypotension -v- likely combination of factors with ATN; nephrology following; vancomycin trough high and vancomycin stopped empirically 8/3 although high trough potentially accumulation as a consequence of diminishing renal function associated with retention/contrast/pressure rather than cause.  Nonetheless, avoid for now; creatinine trending down.         8/7 in retrospect he remembers air in his urine at admit which has raised concern for possible fistula from urinary tract;  No fecaluria and culture from abscess is fecal sherly;  ?rectal-urethral fistula;  Dr Payne aware     Culture with E. coli/Klebsiella species and creatinine generally trended down, transitioned to oral antibiotics at discharge.     Readmitted August 17, 2019 with nausea/vomiting/dry heaves for 3 days.  In  "addition he had diminishing urine output and difficulty emptying his bladder per his report.  No overt dysuria and denies seeing any blood or pyuria.  No flank pain.     Walker catheter was placed and CT scan of the abdomen showed:   \"Previously seen fluid collection identified inferior to the prostate gland is more increased in density on today's examination. There is wall thickening again seen throughout the bladder. Findings again are concerning for cystitis.\" per radiology    8/20/19 sleepy this morning but denies new pain.  No fevers chills or sweats.  No rash.  Denies perirectal pain.  Some irritation in the groin associated with yeast     No headache photophobia or neck stiffness.  No shortness of breath cough or hemoptysis.  No diarrhea.  No hematochezia melena or hematemesis.  No skin rash.         ROS:      8/20/19 No f/c/s. No n/v/d.  No new ADR to Abx.       Constitutional-- No Fever, chills or sweats.  Appetite diminished with fatigue  Heent-- No new vision, hearing or throat complaints.  No epistaxis or oral sores.  Denies odynophagia or dysphagia.  No flashers, floaters or eye pain. No odynophagia or dysphagia. No headache, photophobia or neck stiffness.  CV-- No chest pain, palpitation or syncope  Resp-- No SOB/cough/Hemoptysis  GI- No  diarrhea.  No hematochezia, melena, or hematemesis. Denies jaundice or chronic liver disease.  -- No dysuria, hematuria, or flank pain.  Denies hesitancy, urgency or flank pain.  Lymph- no swollen lymph nodes in neck/axilla or groin.   Heme- No active bruising or bleeding; no Hx of DVT or PE.  MS-- no swelling or pain in the bones or joints of arms/legs.  No new back pain.  Neuro-- No acute focal weakness or numbness in the arms or legs.  No seizures.     Full 12 point review of systems reviewed and negative otherwise for acute complaints, except for above          PE:   /91 (BP Location: Right arm, Patient Position: Lying)   Pulse 80   Temp 98.3 °F (36.8 °C) " "(Oral)   Resp 16   Ht 190.5 cm (75\")   Wt 105 kg (230 lb 9.6 oz)   SpO2 94%   BMI 28.82 kg/m²       GENERAL: Awake and alert, in no acute distress.   HEENT: Normocephalic, atraumatic.  PERRL. EOMI. No conjunctival injection. No icterus. Oropharynx clear without evidence of thrush or exudate. No evidence of peridontal disease.    NECK: Supple without nuchal rigidity. No mass.  LYMPH: No cervical, axillary or inguinal lymphadenopathy.  HEART: RRR; No murmur, rubs, gallops.   LUNGS: Diminished at bases bilaterally without wheezing, rales, rhonchi. Normal respiratory effort. Nonlabored. No dullness.  ABDOMEN: Soft, nontender, nondistended. Positive bowel sounds. No rebound or guarding. NO mass or HSM.  EXT:  No cyanosis, clubbing or edema. No cord.  : Some vague irritation with satellite lesions consistent with cutaneous candidiasis.  + Walker catheter.  MSK: FROM without joint effusions noted arms/legs.    SKIN: Warm and dry without cutaneous eruptions on Inspection/palpation.    NEURO: Oriented to PPT. No focal deficits on motor/sensory exam at arms/legs.  PSYCHIATRIC: Normal insight and judgement. Cooperative with PE     Amputee status noted with left BKA     No peripheral stigmata/phenomena of endocarditis           Laboratory Data    Results from last 7 days   Lab Units 08/20/19  0943 08/19/19  0531 08/17/19  1625   WBC 10*3/mm3 12.85* 12.76* 16.28*   HEMOGLOBIN g/dL 12.2* 11.6* 14.0   HEMATOCRIT % 36.6* 36.4* 42.9   PLATELETS 10*3/mm3 213 227 305     Results from last 7 days   Lab Units 08/20/19  1017   SODIUM mmol/L 143   POTASSIUM mmol/L 3.2*   CHLORIDE mmol/L 102   CO2 mmol/L 29.0   BUN mg/dL 26*   CREATININE mg/dL 1.60*   GLUCOSE mg/dL 183*   CALCIUM mg/dL 8.6     Results from last 7 days   Lab Units 08/17/19  1625   ALK PHOS U/L 168*   BILIRUBIN mg/dL 0.4   ALT (SGPT) U/L 17   AST (SGOT) U/L 35         Results from last 7 days   Lab Units 08/19/19  0531   CRP mg/dL 0.62*       Estimated Creatinine " Clearance: 72.4 mL/min (A) (by C-G formula based on SCr of 1.6 mg/dL (H)).      Microbiology:      Radiology:  Imaging Results (last 72 hours)     Procedure Component Value Units Date/Time    CT Abdomen Pelvis Without Contrast [[id number]] Collected:  08/17/19 1848     Updated:  08/18/19 0947    Narrative:       EXAMINATION: CT ABDOMEN/PELVIS WO CONTRAST - 08/17/2019     INDICATION:  R53.1-Weakness; D72.829-Elevated white blood cell count,  unspecified; Z78.9-Other specified health status. Weakness, abdominal  pain.     TECHNIQUE: Multiple axial CT imaging is obtained of the abdomen and  pelvis without the administration of oral or intravenous contrast.     The radiation dose reduction device was turned on for each scan per the  ALARA (As Low as Reasonably Achievable) protocol.     COMPARISON: 07/30/2019     FINDINGS:      ABDOMEN: The lung bases are grossly clear. The liver is homogeneous in  appearance. No stones in the gallbladder. The spleen is unremarkable.  Pancreas is within normal limits. No abdominal retroperitoneal  lymphadenopathy. The kidneys and adrenal glands are stable with  bilateral myelolipomas present. No free fluid or free air. The appendix  is radiographically normal in appearance. No abnormal mass or fluid  collections identified. The abdominal portion of the gastrointestinal  tract is within normal limits.     PELVIS: The pelvic organs reveal abnormal wall thickening of the  bladder. There is incomplete distention. Cystitis cannot be excluded.  There is stool seen within the rectum. No definite abscess collection  identified on today's examination. The pelvic portion of the  gastrointestinal tract is within normal limits. No free fluid or free  air. No abnormal mass or fluid collections identified. Previously seen  fluid collection inferior to the prostate is more increased in density  on today's examination. No definite abnormal fluid collection  identified.       Impression:       Previously  seen fluid collection identified inferior to the  prostate gland is more increased in density on today's examination.  There is wall thickening again seen throughout the bladder. Findings  again are concerning for cystitis. Cystography can be performed for  further evaluation. Remainder of the CT abdomen and pelvis is stable.     DICTATED:   08/17/2019  EDITED/ls :   08/17/2019         This report was finalized on 8/18/2019 9:44 AM by Dr. Nella Enriquez MD.       XR Chest 1 View [[id number]] Collected:  08/17/19 1842     Updated:  08/18/19 0947    Narrative:          EXAMINATION: XR CHEST, SINGLE VIEW - 08/17/2019     INDICATION:  R53.1-Weakness; D72.829-Elevated white blood cell count,  unspecified; Z78.9-Other specified health status.      COMPARISON: None.     FINDINGS: Portable chest reveals cardiac and mediastinal silhouettes  within normal limits. Bony structures are unremarkable. No pleural  effusion or pneumothorax. No focal parenchymal opacification present.          Impression:       No acute cardiopulmonary disease.     DICTATED:   08/17/2019  EDITED/ls :   08/17/2019      This report was finalized on 8/18/2019 9:44 AM by Dr. Nella Enriquez MD.       CT Abdomen Pelvis Without Contrast [[id number]] Collected:  08/18/19 0331     Updated:  08/18/19 0333    Narrative:       CT Abdomen Pelvis WO    INDICATION:   Abdominal pain and elevated white count    TECHNIQUE:   CT of the abdomen and pelvis without contrast. Coronal and sagittal reconstructions were obtained.  Radiation dose reduction techniques included automated exposure control or exposure modulation based on body size. Radiation audit for number of CT and  nuclear cardiology exams performed in the last year: 6.      COMPARISON:   8/17/2019 at 6:32 PM    FINDINGS:  Included lung bases are clear. The heart size is the upper limits of normal.    Abdomen: Noncontrasted imaging of the liver, gallbladder, spleen, pancreas and kidneys are within  normal limits. Redemonstrated are bilateral adrenal nodules. The aorta is within normal limits. No threshold retroperitoneal adenopathy.    Pelvis: The bowel pattern is nonobstructive. No free air. No focal inflammatory change. Walker catheter demonstrated in the bladder. No threshold pelvic or inguinal adenopathy.    Regional osseous structures show no acute or aggressive bone lesions. Degenerative changes in the lower lumbar spine.      Impression:         1.  Interval placement of Walker catheter in bladder from prior study.  2.  Bilateral adrenal nodules which are stable from prior study.          Signer Name: Shane Watts MD   Signed: 8/18/2019 3:31 AM   Workstation Name: [id number]    [facility]            Impression:     --Acute nausea/vomiting/dry heaves over 3 days preceding admission and sought medical attention August 17 per family.  Likely acute dehydration.  Unclear at present if this relates to UTI versus other intra-abdominal process versus medication or other.  Currently abdominal exam is benign, no further vomiting today.  Rehydration/nutrition per medicine at their discretion    --Acute cutaneous candidiasis,  with urinary culture from August 17 negative and I suspect that the urine culture from August 18 represents cutaneous contamination particularly in light of epithelia cells present in that 8/18 sample; I am not convinced for urinary tract infection so far.  Mycamine initiated for cutaneous disease.     --Acute perirectal abscess ; Colorectal surgery, Dr. Payne following to help guide timing/options/threshold for further drainage if needed.  Drainage as above on August 2 ;  Mixed Fecal sherly in culture; CT scan as above     --History urinary retention.  Medicine following to help guide further work-up, urology input, etc.     --ARF postop;  ?obstruction initially associated with retention postop (1200 out with I/O cath postop per nursing) -v- contrast from CT -v-  lisinopril/medication/vancomycin -v- relative hypotension -v- likely combination of factors with ATN;  vancomycin trough high and vancomycin stopped empirically 8/3 although high trough potentially accumulation as a consequence of diminishing renal function associated with retention/contrast/pressure rather than cause.  Nonetheless, avoid for now; likely further acute kidney injury at admission August 17 associated with dehydration/prerenal/ATN etiology     --History MRSA;  Not in current culture     --Diabetes mellitus type 2, uncontrolled.  He reports the reason for this is forgetfulness     --History Johnston and chronic liver disease per past notes     --Left BKA     --Peripheral vascular disease        PLAN:     --IV zosyn/mycamine     --I discussed potential risks and benefits of the prescribed antibiotics that include, but are not limited to, solid organ toxicity,CDiff, cytopenias, hypersensitivity,  etc.. Patient/Family voice understanding and agree to proceed.     --Check/review labs cultures and scans     --Discussed with microbiology     --History per nursing staff     --Discussed with Nephroogy     --Discussed with family, partial history per them     --Highly complex set of issues with high risk for further serious morbidity and other serious sequela     --Colorectal surgery following to help guide potential for further intervention related to perirectal abscess versus further work-up versus observation or other           Usman Dong MD  8/20/2019

## 2019-08-20 NOTE — PROGRESS NOTES
"Continued Stay Note  [facility]     Patient Name: Kendrick Crystal  MRN: [id number]  Today's Date: 8/20/2019    Admit Date: 8/17/2019    Discharge Plan     Row Name 08/20/19 1521       Plan    Plan  HOME with     Patient/Family in Agreement with Plan  yes    Plan Comments  Met with pt and wife at bedside to f/u DCP.  Pt has refused to work with therapy today and is now unable/unwilling to pivot to BSC with nsg (he was able to pivot yesterday).  CM discussed DC options with pt, including STR (if he participates with therapy) and LTC.  Pt has adamantly refused both options.  He insists that he is going home upon DC.  Wife expressed concerns regarding her ability to care for pt at home.  CM discussed with both that, given pt has not been deemed incompetent, he can return to his handicap accessible apartment upon DC (pt and wife have separate apartment units).  If there are concerns for pt safety or self-neglect, CM/SW can file an APS report.  Wife has inicated that they \"have people that can help\" assist him into his apartment.  Pt has a power chair that he is able to transfer to/from at [facility], however, he does not have it at [facility].  If transportation is of concern, CM can assist with arranging [employer] upon DC.  CM will cont to follow and assist with DCP as pt allows.      Final Discharge Disposition Code  06 - home with home health care        Discharge Codes    No documentation.       Expected Discharge Date and Time     Expected Discharge Date Expected Discharge Time    Aug 23, 2019             Christelle Carl    "

## 2019-08-20 NOTE — PROGRESS NOTES
"    [facility] Medicine Services  PROGRESS NOTE    Patient Name: Kendrick Crystal  : 1970  MRN: [id number]    Date of Admission: 2019  Length of Stay: 3  Primary Care Physician: Keyanna Leone APRN    Subjective   Subjective     CC:  F/U N/V    HPI:  Patient seen this morning. Laying on his side in bed. Limited conversation. Denies pain currently. \"I feel like crap.\" But denies nausea or vomiting.    Review of Systems  Gen-no fevers, no chills  CV-no chest pain, no palpitations  Resp-no cough, no dyspnea  GI-no N/V/D, no abd pain    Objective   Objective     Vital Signs:   Temp:  [98.1 °F (36.7 °C)-98.3 °F (36.8 °C)] 98.3 °F (36.8 °C)  Heart Rate:  [71-80] 80  Resp:  [16] 16  BP: (132-146)/(81-91) 145/91        Physical Exam:  Gen-no acute distress  HENT-NCAT, mucous membranes moist  CV-RRR, S1 S2 normal, no m/r/g  Resp-CTAB, no wheezes or rales  Abd-soft, NT, ND, +BS  Ext-no edema  Neuro-awake, alert, but not wanting to engage in conversation with me, no focal deficits  Skin-no rashes  Psych-appropriate mood      Results Reviewed:    Results from last 7 days   Lab Units 19  0943 19  0531 19  1625   WBC 10*3/mm3 12.85* 12.76* 16.28*   HEMOGLOBIN g/dL 12.2* 11.6* 14.0   HEMATOCRIT % 36.6* 36.4* 42.9   PLATELETS 10*3/mm3 213 227 305     Results from last 7 days   Lab Units 19  1017 19  0531 19  1625   SODIUM mmol/L 143 134* 137   POTASSIUM mmol/L 3.2* 3.5 4.5   CHLORIDE mmol/L 102 95* 90*   CO2 mmol/L 29.0 23.0 29.0   BUN mg/dL 26* 35* 35*   CREATININE mg/dL 1.60* 1.93* 1.91*   GLUCOSE mg/dL 183* 278* 296*   CALCIUM mg/dL 8.6 7.6* 9.0   ALT (SGPT) U/L  --   --  17   AST (SGOT) U/L  --   --  35     Estimated Creatinine Clearance: 72.4 mL/min (A) (by C-G formula based on SCr of 1.6 mg/dL (H)).    Microbiology Results Abnormal     Procedure Component Value - Date/Time    Blood Culture - Blood, Arm, Left [[id number]] Collected:  19 1735    Lab " Status:  Preliminary result Specimen:  Blood from Arm, Left Updated:  08/19/19 1800     Blood Culture No growth at 2 days    Blood Culture - Blood, Hand, Left [[id number]] Collected:  08/17/19 1745    Lab Status:  Preliminary result Specimen:  Blood from Hand, Left Updated:  08/19/19 1800     Blood Culture No growth at 2 days    Urine Culture - Urine, Urine, Clean Catch [[id number]]  (Abnormal) Collected:  08/18/19 0407    Lab Status:  Final result Specimen:  Urine, Clean Catch Updated:  08/19/19 1401     Urine Culture >100,000 CFU/mL Yeast, Not Candida albicans    Urine Culture - Urine, Urine, Catheter [[id number]]  (Normal) Collected:  08/17/19 1729    Lab Status:  Final result Specimen:  Urine, Catheter Updated:  08/19/19 0932     Urine Culture No growth          Imaging Results (last 24 hours)     ** No results found for the last 24 hours. **          Results for orders placed during the hospital encounter of 05/18/19   Adult Transthoracic Echo Complete W/ Cont if Necessary Per Protocol    Narrative · Estimated EF = 60%.  · Mild mitral valve regurgitation is present  · Mild tricuspid valve regurgitation is present.  · Calculated right ventricular systolic pressure from tricuspid   regurgitation is 29 mmHg.          I have reviewed the medications:  Scheduled Meds:  amLODIPine 10 mg Oral Q24H   docusate sodium 100 mg Oral BID   DULoxetine 30 mg Oral Daily   famotidine 20 mg Intravenous Q12H   heparin (porcine) 5,000 Units Subcutaneous Q12H   hydrALAZINE 100 mg Oral Q8H   insulin detemir 15 Units Subcutaneous Q12H   insulin lispro 0-7 Units Subcutaneous 4x Daily With Meals & Nightly   insulin lispro 5 Units Subcutaneous TID With Meals   metoprolol tartrate 100 mg Oral Q12H   micafungin (MYCAMINE)  mg Intravenous Q24H   piperacillin-tazobactam 3.375 g Intravenous Q8H   saccharomyces boulardii 250 mg Oral BID   sevelamer 800 mg Oral TID With Meals   sodium chloride 3 mL Intravenous Q12H   tamsulosin 0.8 mg Oral  Nightly   terazosin 5 mg Oral Nightly     Continuous Infusions:   PRN Meds:.•  acetaminophen **OR** acetaminophen **OR** acetaminophen  •  ALPRAZolam  •  bisacodyl  •  dextrose  •  dextrose  •  glucagon (human recombinant)  •  HYDROcodone-acetaminophen  •  LORazepam  •  ondansetron  •  QUEtiapine  •  sodium chloride  •  sodium chloride      Assessment/Plan   Assessment / Plan     Active Hospital Problems    Diagnosis  POA   • **Intractable nausea and vomiting [R11.2]  Yes   • Perirectal abscess I&D 8/2/19 [K61.1]  Yes   • Dehydration [E86.0]  Yes   • Gastroparesis [K31.84]  Yes   • Leukocytosis [D72.829]  Yes   • SIRS (systemic inflammatory response syndrome) (CMS/HCC) [R65.10]  Yes   • Obesity (BMI 30-39.9) [E66.9]  Yes   • DM (diabetes mellitus) type II uncontrolled, periph vascular disorder (CMS/HCC) [E11.51, E11.65]  Yes   • S/P BKA (below knee amputation), left (CMS/HCC) [Z89.512]  Not Applicable   • Essential hypertension [I10]  Yes   • DUNLAP Cirrhosis [K74.60]  Yes      Resolved Hospital Problems    Diagnosis Date Resolved POA   • Lactic acidosis [E87.2] 08/18/2019 Yes        Brief Hospital Course to date:  Kendrick Crystal is a 48 y.o. male with hx of uncontrolled 2 diabetes recently admitted from 7/30/2019 until 8/14/2019 with sepsis, perirectal abscess as well as rectal fistula that underwent operative drainage on 8/2/2019 with Dr. Payne. Cultures returned positive for E. coli and Klebsiella and he was treated with IV antibiotics per Dr. Dong. He was discharged on 8/14/2019 with oral Ceftin and Flagyl. He then developed extreme nausea and vomiting and has had difficulty keeping his antibiotics down. Workup in the ER revealed worsening leukocytosis and lactic acidosis. UA consistent with infection. CT A/P showed increase in fluid collection density concerning for recurrent abscess.    PLAN:  --Urine culture growing Candida. Continue Zosyn and Micafungin per ID.   --Dr. Sunshine following. May need further  drainage.   --Renal function continues to improve since last admission. Monitor.     DVT Prophylaxis:  H    Disposition: I expect the patient to be discharged TBD    CODE STATUS:   Code Status and Medical Interventions:   Ordered at: 08/17/19 5283     Level Of Support Discussed With:    Patient     Code Status:    CPR     Medical Interventions (Level of Support Prior to Arrest):    Full         Electronically signed by Jacqueline Porras MD, 08/20/19, 2:15 PM.

## 2019-08-20 NOTE — PROGRESS NOTES
"Adult Nutrition  Assessment/PES    Patient Name:  Kendrick Crystal  YOB: 1970  MRN: [id number]  Admit Date:  8/17/2019    Assessment Date:  8/20/2019    Comments:      Reason for Assessment     Row Name 08/20/19 0729          Reason for Assessment    Reason For Assessment  follow-up protocol 15\"                   Nutrition Prescription Ordered     Row Name 08/20/19 0729          Nutrition Prescription PO    Current PO Diet  Regular     Common Modifiers  Cardiac;Consistent Carbohydrate                 Problem/Interventions:  Problem 1     Row Name 08/20/19 0730          Nutrition Diagnoses Problem 1    Problem 1  Altered GI Function         Problem 2     Row Name 08/20/19 0730          Nutrition Diagnoses Problem 2    Problem 2  Knowledge Deficit     Etiology (related to)  MNT for Treatment/Condition     Signs/Symptoms (evidenced by)  Potential Information Deficit     Resolved?  Yes         Problem 3     Row Name 08/20/19 0731          Nutrition Diagnoses Problem 3    Problem 3  Nutrition Appropriate for Condition at this Time     Signs/Symptoms (evidenced by)  PO Intake     Percent (%) intake recorded  83 %     Over number of meals  3                   Education/Evaluation     Row Name 08/20/19 0731          Monitor/Evaluation    Monitor  Per protocol           Electronically signed by:  Erinn Melgar RD  08/20/19 7:34 AM  "

## 2019-08-20 NOTE — PLAN OF CARE
Problem: Patient Care Overview  Goal: Plan of Care Review  Outcome: Ongoing (interventions implemented as appropriate)   08/20/19 1627   Coping/Psychosocial   Plan of Care Reviewed With patient;spouse   Plan of Care Review   Progress no change   OTHER   Outcome Summary Pt. has requested again to leave AMA this shift. Refusing to work with PT/OT. VSS. Receiving IV ABX. Will continue to monitor       Problem: Skin Injury Risk (Adult)  Goal: Identify Related Risk Factors and Signs and Symptoms  Outcome: Ongoing (interventions implemented as appropriate)   08/20/19 1627   Skin Injury Risk (Adult)   Related Risk Factors (Skin Injury Risk) body weight extremes;hospitalization prolonged;infection;mobility impaired;nutritional deficiencies

## 2019-08-21 LAB
ANION GAP SERPL CALCULATED.3IONS-SCNC: 12 MMOL/L (ref 5–15)
BUN BLD-MCNC: 23 MG/DL (ref 6–20)
BUN/CREAT SERPL: 14.5 (ref 7–25)
CALCIUM SPEC-SCNC: 8.4 MG/DL (ref 8.6–10.5)
CHLORIDE SERPL-SCNC: 102 MMOL/L (ref 98–107)
CO2 SERPL-SCNC: 28 MMOL/L (ref 22–29)
CREAT BLD-MCNC: 1.59 MG/DL (ref 0.76–1.27)
DEPRECATED RDW RBC AUTO: 47.8 FL (ref 37–54)
ERYTHROCYTE [DISTWIDTH] IN BLOOD BY AUTOMATED COUNT: 15.4 % (ref 12.3–15.4)
GFR SERPL CREATININE-BSD FRML MDRD: 46 ML/MIN/1.73
GLUCOSE BLD-MCNC: 103 MG/DL (ref 65–99)
GLUCOSE BLDC GLUCOMTR-MCNC: 108 MG/DL (ref 70–130)
GLUCOSE BLDC GLUCOMTR-MCNC: 139 MG/DL (ref 70–130)
GLUCOSE BLDC GLUCOMTR-MCNC: 193 MG/DL (ref 70–130)
GLUCOSE BLDC GLUCOMTR-MCNC: 211 MG/DL (ref 70–130)
HCT VFR BLD AUTO: 37.5 % (ref 37.5–51)
HGB BLD-MCNC: 12.1 G/DL (ref 13–17.7)
MCH RBC QN AUTO: 27.6 PG (ref 26.6–33)
MCHC RBC AUTO-ENTMCNC: 32.3 G/DL (ref 31.5–35.7)
MCV RBC AUTO: 85.6 FL (ref 79–97)
PLATELET # BLD AUTO: 219 10*3/MM3 (ref 140–450)
PMV BLD AUTO: 12.4 FL (ref 6–12)
POTASSIUM BLD-SCNC: 3.3 MMOL/L (ref 3.5–5.2)
RBC # BLD AUTO: 4.38 10*6/MM3 (ref 4.14–5.8)
SODIUM BLD-SCNC: 142 MMOL/L (ref 136–145)
WBC NRBC COR # BLD: 12.61 10*3/MM3 (ref 3.4–10.8)

## 2019-08-21 PROCEDURE — 63710000001 INSULIN DETEMIR PER 5 UNITS: Performed by: INTERNAL MEDICINE

## 2019-08-21 PROCEDURE — 25010000002 ONDANSETRON PER 1 MG: Performed by: NURSE PRACTITIONER

## 2019-08-21 PROCEDURE — 82962 GLUCOSE BLOOD TEST: CPT

## 2019-08-21 PROCEDURE — 25010000002 PIPERACILLIN SOD-TAZOBACTAM PER 1 G: Performed by: NURSE PRACTITIONER

## 2019-08-21 PROCEDURE — 25010000003 MICAFUNGIN SODIUM 100 MG RECONSTITUTED SOLUTION: Performed by: INTERNAL MEDICINE

## 2019-08-21 PROCEDURE — 80048 BASIC METABOLIC PNL TOTAL CA: CPT | Performed by: HOSPITALIST

## 2019-08-21 PROCEDURE — 25010000002 HEPARIN (PORCINE) PER 1000 UNITS: Performed by: INTERNAL MEDICINE

## 2019-08-21 PROCEDURE — 99232 SBSQ HOSP IP/OBS MODERATE 35: CPT | Performed by: HOSPITALIST

## 2019-08-21 PROCEDURE — 85027 COMPLETE CBC AUTOMATED: CPT | Performed by: HOSPITALIST

## 2019-08-21 RX ORDER — POTASSIUM CHLORIDE 750 MG/1
40 CAPSULE, EXTENDED RELEASE ORAL ONCE
Status: COMPLETED | OUTPATIENT
Start: 2019-08-21 | End: 2019-08-21

## 2019-08-21 RX ADMIN — INSULIN LISPRO 5 UNITS: 100 INJECTION, SOLUTION INTRAVENOUS; SUBCUTANEOUS at 16:27

## 2019-08-21 RX ADMIN — POTASSIUM CHLORIDE 40 MEQ: 750 CAPSULE, EXTENDED RELEASE ORAL at 09:52

## 2019-08-21 RX ADMIN — TAZOBACTAM SODIUM AND PIPERACILLIN SODIUM 3.38 G: 375; 3 INJECTION, SOLUTION INTRAVENOUS at 00:41

## 2019-08-21 RX ADMIN — TAZOBACTAM SODIUM AND PIPERACILLIN SODIUM 3.38 G: 375; 3 INJECTION, SOLUTION INTRAVENOUS at 16:26

## 2019-08-21 RX ADMIN — SODIUM CHLORIDE, PRESERVATIVE FREE 3 ML: 5 INJECTION INTRAVENOUS at 09:53

## 2019-08-21 RX ADMIN — TAMSULOSIN HYDROCHLORIDE 0.8 MG: 0.4 CAPSULE ORAL at 19:40

## 2019-08-21 RX ADMIN — ONDANSETRON 4 MG: 2 INJECTION INTRAMUSCULAR; INTRAVENOUS at 09:08

## 2019-08-21 RX ADMIN — MICAFUNGIN SODIUM 100 MG: 20 INJECTION, POWDER, LYOPHILIZED, FOR SOLUTION INTRAVENOUS at 09:58

## 2019-08-21 RX ADMIN — TAZOBACTAM SODIUM AND PIPERACILLIN SODIUM 3.38 G: 375; 3 INJECTION, SOLUTION INTRAVENOUS at 09:53

## 2019-08-21 RX ADMIN — Medication 250 MG: at 19:39

## 2019-08-21 RX ADMIN — INSULIN LISPRO 3 UNITS: 100 INJECTION, SOLUTION INTRAVENOUS; SUBCUTANEOUS at 12:09

## 2019-08-21 RX ADMIN — AMLODIPINE BESYLATE 10 MG: 10 TABLET ORAL at 09:52

## 2019-08-21 RX ADMIN — HYDRALAZINE HYDROCHLORIDE 100 MG: 50 TABLET, FILM COATED ORAL at 16:26

## 2019-08-21 RX ADMIN — FAMOTIDINE 20 MG: 10 INJECTION, SOLUTION INTRAVENOUS at 19:38

## 2019-08-21 RX ADMIN — INSULIN LISPRO 5 UNITS: 100 INJECTION, SOLUTION INTRAVENOUS; SUBCUTANEOUS at 12:09

## 2019-08-21 RX ADMIN — INSULIN DETEMIR 15 UNITS: 100 INJECTION, SOLUTION SUBCUTANEOUS at 19:39

## 2019-08-21 RX ADMIN — Medication 250 MG: at 09:53

## 2019-08-21 RX ADMIN — METOPROLOL TARTRATE 100 MG: 50 TABLET ORAL at 19:39

## 2019-08-21 RX ADMIN — METOPROLOL TARTRATE 100 MG: 50 TABLET ORAL at 09:53

## 2019-08-21 RX ADMIN — HEPARIN SODIUM 5000 UNITS: 5000 INJECTION INTRAVENOUS; SUBCUTANEOUS at 09:52

## 2019-08-21 RX ADMIN — DOCUSATE SODIUM 100 MG: 100 CAPSULE, LIQUID FILLED ORAL at 22:02

## 2019-08-21 RX ADMIN — TERAZOSIN HYDROCHLORIDE ANHYDROUS 5 MG: 5 CAPSULE ORAL at 19:40

## 2019-08-21 RX ADMIN — HYDRALAZINE HYDROCHLORIDE 100 MG: 50 TABLET, FILM COATED ORAL at 19:39

## 2019-08-21 RX ADMIN — HEPARIN SODIUM 5000 UNITS: 5000 INJECTION INTRAVENOUS; SUBCUTANEOUS at 19:38

## 2019-08-21 RX ADMIN — SEVELAMER CARBONATE 0.8 G: 0.8 POWDER, FOR SUSPENSION ORAL at 09:53

## 2019-08-21 RX ADMIN — DULOXETINE HYDROCHLORIDE 30 MG: 30 CAPSULE, DELAYED RELEASE ORAL at 09:53

## 2019-08-21 RX ADMIN — FAMOTIDINE 20 MG: 10 INJECTION, SOLUTION INTRAVENOUS at 09:52

## 2019-08-21 RX ADMIN — INSULIN LISPRO 3 UNITS: 100 INJECTION, SOLUTION INTRAVENOUS; SUBCUTANEOUS at 16:27

## 2019-08-21 NOTE — PROGRESS NOTES
[facility] Progress Note        Antibiotics:  Anti-Infectives (From admission, onward)    Ordered     Dose/Rate Route Frequency Start Stop    19 0735  micafungin 100 mg/100 mL 0.9% NS IVPB (mbp)     Ordering Provider:  Usman Dong MD    100 mg  over 60 Minutes Intravenous Every 24 Hours 19 0900 19 0859    19 1850  piperacillin-tazobactam (ZOSYN) 3.375 g in iso-osmotic dextrose 50 ml (premix)     Ordering Provider:  Mami Luz APRN    3.375 g  over 4 Hours Intravenous Every 8 Hours 19 0100 19 0058    19 1724  piperacillin-tazobactam (ZOSYN) 3.375 g in iso-osmotic dextrose 50 ml (premix)     Ordering Provider:  Foster Beebe MD    3.375 g Intravenous Once 19 1726 19 1827          CC: N/V    HPI:    Patient is a 48 y.o.  Yr old male with history of poorly contolled T2DM, DUNLAP/liver cirrhosis, HTN, PVD/Left BKA, and multiple skin abscesses/MRSA who presented to [facility] ED with right shin wound infection summer 2018.  He was unable to get to see Dr. Martinez as his father passed away unexpectedly on 18 and he had to wait until after the .  The wound worsened becoming gangrenous and he came to [facility] ED in 2018.  He also had been hospitalized at [facility] and then transferred to  for a severe penis/scrotum infection requiring surgical debridement in summer 2018.  He received antibiotics regarding his right leg infection, generally improved over the remainder of summer 2018 but that area had not completely healed. He was admitted 2019 with acute left lower abdominal wall redness/swelling and pain, spontaneous drainage associated with fever/chills and uncontrolled blood sugars.   I&D requiring wide debridement , severe/deep infection and transferred to [facility] on May 3 with IV Zosyn/oral doxycycline. Cultures had lactobacillus and mixed gram-positive skin sherly including alpha strep, staph epidermidis and  corynebacterium. Readmitted to [facility] May 18, 2019, increasing generalized edema ultimately transitioned to oral doxycycline and healed.     He presented to the emergency room July 30, 2019 with acute rectal pain that had begun 7/27; this is associated with constipation for days, chills and nausea/dry heaving.  White blood cell count elevated, high hemoglobin A1c over 13, urinalysis with hematuria/pyuria and CT scan with 3 x 3 cm fluid collection anterior to the distal rectum and per discussion with Dr. Akbar/Jennifer, this is not in the prostate.  Colorectal surgery/urology saw him for consideration of surgical options/timing/threshold, etc..     8/2/19 I&Dper Dr Payne perirectal abscess; patient reports that he had instrumented his rectum prior to hospitalization with enema     8/3/19 urinary retention overnight requiring in/out catheterization per patient.  Creat climb     8/4/19 further creat climb; childs placed and lisinopril stopped and d/w Dr Rubio;  ?obstruction initially associated with retention postop (1200 out with I/O cath postop per nursing) -v- contrast from CT -v- lisinopril/medication/vancomycin -v- relative hypotension -v- likely combination of factors with ATN; nephrology following; vancomycin trough high and vancomycin stopped empirically 8/3 although high trough potentially accumulation as a consequence of diminishing renal function associated with retention/contrast/pressure rather than cause.  Nonetheless, avoid for now; creatinine trending down.         8/7 in retrospect he remembers air in his urine at admit which has raised concern for possible fistula from urinary tract;  No fecaluria and culture from abscess is fecal sherly;  ?rectal-urethral fistula;  Dr Payne aware     Culture with E. coli/Klebsiella species and creatinine generally trended down, transitioned to oral antibiotics at discharge.     Readmitted August 17, 2019 with nausea/vomiting/dry heaves for 3 days.  In  "addition he had diminishing urine output and difficulty emptying his bladder per his report.  No overt dysuria and denies seeing any blood or pyuria.  No flank pain.     Walker catheter was placed and CT scan of the abdomen showed:   \"Previously seen fluid collection identified inferior to the prostate gland is more increased in density on today's examination. There is wall thickening again seen throughout the bladder. Findings again are concerning for cystitis.\" per radiology    8/21/19 sleepy this morning but denies new pain.  No fevers chills or sweats.  No rash.  Denies perirectal pain.  Some irritation in the groin associated with yeast     No headache photophobia or neck stiffness.  No shortness of breath cough or hemoptysis.  No diarrhea.  No hematochezia melena or hematemesis.  No skin rash.         ROS:      8/21/19 No f/c/s. No n/v/d.  No new ADR to Abx.       Constitutional-- No Fever, chills or sweats.  Appetite diminished with fatigue  Heent-- No new vision, hearing or throat complaints.  No epistaxis or oral sores.  Denies odynophagia or dysphagia.  No flashers, floaters or eye pain. No odynophagia or dysphagia. No headache, photophobia or neck stiffness.  CV-- No chest pain, palpitation or syncope  Resp-- No SOB/cough/Hemoptysis  GI- No  diarrhea.  No hematochezia, melena, or hematemesis. Denies jaundice or chronic liver disease.  -- No dysuria, hematuria, or flank pain.  Denies hesitancy, urgency or flank pain.  Lymph- no swollen lymph nodes in neck/axilla or groin.   Heme- No active bruising or bleeding; no Hx of DVT or PE.  MS-- no swelling or pain in the bones or joints of arms/legs.  No new back pain.  Neuro-- No acute focal weakness or numbness in the arms or legs.  No seizures.     Full 12 point review of systems reviewed and negative otherwise for acute complaints, except for above          PE:   /97 (BP Location: Left arm, Patient Position: Lying)   Pulse 77   Temp 98.4 °F (36.9 °C) " "(Oral)   Resp 18   Ht 190.5 cm (75\")   Wt 105 kg (230 lb 9.6 oz)   SpO2 94%   BMI 28.82 kg/m²       GENERAL:sleepy, in no acute distress.   HEENT: Normocephalic, atraumatic.  PERRL. EOMI. No conjunctival injection. No icterus. Oropharynx clear without evidence of thrush or exudate. No evidence of peridontal disease.    NECK: Supple without nuchal rigidity. No mass.  LYMPH: No cervical, axillary or inguinal lymphadenopathy.  HEART: RRR; No murmur, rubs, gallops.   LUNGS: Diminished at bases bilaterally without wheezing, rales, rhonchi. Normal respiratory effort. Nonlabored. No dullness.  ABDOMEN: Soft, nontender, nondistended. Positive bowel sounds. No rebound or guarding. NO mass or HSM.  EXT:  No cyanosis, clubbing or edema. No cord.  : Some vague irritation with satellite lesions consistent with cutaneous candidiasis.  + Walker catheter.  MSK: FROM without joint effusions noted arms/legs.    SKIN: Warm and dry without cutaneous eruptions on Inspection/palpation.    NEURO: sleepy     Amputee status noted with left BKA     No peripheral stigmata/phenomena of endocarditis           Laboratory Data    Results from last 7 days   Lab Units 08/21/19  0430 08/20/19  0943 08/19/19  0531   WBC 10*3/mm3 12.61* 12.85* 12.76*   HEMOGLOBIN g/dL 12.1* 12.2* 11.6*   HEMATOCRIT % 37.5 36.6* 36.4*   PLATELETS 10*3/mm3 219 213 227     Results from last 7 days   Lab Units 08/21/19  0430   SODIUM mmol/L 142   POTASSIUM mmol/L 3.3*   CHLORIDE mmol/L 102   CO2 mmol/L 28.0   BUN mg/dL 23*   CREATININE mg/dL 1.59*   GLUCOSE mg/dL 103*   CALCIUM mg/dL 8.4*     Results from last 7 days   Lab Units 08/17/19  1625   ALK PHOS U/L 168*   BILIRUBIN mg/dL 0.4   ALT (SGPT) U/L 17   AST (SGOT) U/L 35         Results from last 7 days   Lab Units 08/19/19  0531   CRP mg/dL 0.62*       Estimated Creatinine Clearance: 72.9 mL/min (A) (by C-G formula based on SCr of 1.59 mg/dL (H)).      Microbiology:      Radiology:  Imaging Results (last 72 " hours)     Procedure Component Value Units Date/Time    CT Abdomen Pelvis Without Contrast [[id number]] Collected:  08/17/19 1848     Updated:  08/18/19 0947    Narrative:       EXAMINATION: CT ABDOMEN/PELVIS WO CONTRAST - 08/17/2019     INDICATION:  R53.1-Weakness; D72.829-Elevated white blood cell count,  unspecified; Z78.9-Other specified health status. Weakness, abdominal  pain.     TECHNIQUE: Multiple axial CT imaging is obtained of the abdomen and  pelvis without the administration of oral or intravenous contrast.     The radiation dose reduction device was turned on for each scan per the  ALARA (As Low as Reasonably Achievable) protocol.     COMPARISON: 07/30/2019     FINDINGS:      ABDOMEN: The lung bases are grossly clear. The liver is homogeneous in  appearance. No stones in the gallbladder. The spleen is unremarkable.  Pancreas is within normal limits. No abdominal retroperitoneal  lymphadenopathy. The kidneys and adrenal glands are stable with  bilateral myelolipomas present. No free fluid or free air. The appendix  is radiographically normal in appearance. No abnormal mass or fluid  collections identified. The abdominal portion of the gastrointestinal  tract is within normal limits.     PELVIS: The pelvic organs reveal abnormal wall thickening of the  bladder. There is incomplete distention. Cystitis cannot be excluded.  There is stool seen within the rectum. No definite abscess collection  identified on today's examination. The pelvic portion of the  gastrointestinal tract is within normal limits. No free fluid or free  air. No abnormal mass or fluid collections identified. Previously seen  fluid collection inferior to the prostate is more increased in density  on today's examination. No definite abnormal fluid collection  identified.       Impression:       Previously seen fluid collection identified inferior to the  prostate gland is more increased in density on today's examination.  There is wall  thickening again seen throughout the bladder. Findings  again are concerning for cystitis. Cystography can be performed for  further evaluation. Remainder of the CT abdomen and pelvis is stable.     DICTATED:   08/17/2019  EDITED/ls :   08/17/2019         This report was finalized on 8/18/2019 9:44 AM by Dr. Nella Enriquez MD.       XR Chest 1 View [[id number]] Collected:  08/17/19 1842     Updated:  08/18/19 0947    Narrative:          EXAMINATION: XR CHEST, SINGLE VIEW - 08/17/2019     INDICATION:  R53.1-Weakness; D72.829-Elevated white blood cell count,  unspecified; Z78.9-Other specified health status.      COMPARISON: None.     FINDINGS: Portable chest reveals cardiac and mediastinal silhouettes  within normal limits. Bony structures are unremarkable. No pleural  effusion or pneumothorax. No focal parenchymal opacification present.          Impression:       No acute cardiopulmonary disease.     DICTATED:   08/17/2019  EDITED/ls :   08/17/2019      This report was finalized on 8/18/2019 9:44 AM by Dr. Nella Enriquez MD.       CT Abdomen Pelvis Without Contrast [[id number]] Collected:  08/18/19 0331     Updated:  08/18/19 0333    Narrative:       CT Abdomen Pelvis WO    INDICATION:   Abdominal pain and elevated white count    TECHNIQUE:   CT of the abdomen and pelvis without contrast. Coronal and sagittal reconstructions were obtained.  Radiation dose reduction techniques included automated exposure control or exposure modulation based on body size. Radiation audit for number of CT and  nuclear cardiology exams performed in the last year: 6.      COMPARISON:   8/17/2019 at 6:32 PM    FINDINGS:  Included lung bases are clear. The heart size is the upper limits of normal.    Abdomen: Noncontrasted imaging of the liver, gallbladder, spleen, pancreas and kidneys are within normal limits. Redemonstrated are bilateral adrenal nodules. The aorta is within normal limits. No threshold retroperitoneal  adenopathy.    Pelvis: The bowel pattern is nonobstructive. No free air. No focal inflammatory change. Walker catheter demonstrated in the bladder. No threshold pelvic or inguinal adenopathy.    Regional osseous structures show no acute or aggressive bone lesions. Degenerative changes in the lower lumbar spine.      Impression:         1.  Interval placement of Walker catheter in bladder from prior study.  2.  Bilateral adrenal nodules which are stable from prior study.          Signer Name: Shane Watts MD   Signed: 8/18/2019 3:31 AM   Workstation Name: [id number]    [facility]            Impression:     --Acute nausea/vomiting/dry heaves over 3 days preceding admission and sought medical attention August 17 per family.  Likely acute dehydration.  Unclear at present if this relates to UTI versus other intra-abdominal process versus medication (I.e. Flagyl) or other.  Currently abdominal exam is benign, no further vomiting today.  Rehydration/nutrition per medicine at their discretion    --Acute cutaneous candidiasis,  with urinary culture from August 17 negative and I suspect that the urine culture from August 18 represents cutaneous contamination particularly in light of epithelia cells present in that 8/18 sample; I am not convinced for urinary tract infection so far.  Mycamine initiated for cutaneous disease.     --Acute perirectal abscess ; Colorectal surgery, Dr. Payne following to help guide timing/options/threshold for further drainage if needed.  Drainage as above on August 2 ;  Mixed Fecal sherly in culture; CT scans as above with no mention of abscess on 8/18 study;  Need Dr Payne to help clarify     --History urinary retention.  Medicine following to help guide further work-up, urology input, etc.     --ARF postop;  ?obstruction initially associated with retention postop (1200 out with I/O cath postop per nursing) -v- contrast from CT -v- lisinopril/medication/vancomycin -v-  relative hypotension -v- likely combination of factors with ATN;  vancomycin trough high and vancomycin stopped empirically 8/3 although high trough potentially accumulation as a consequence of diminishing renal function associated with retention/contrast/pressure rather than cause.  Nonetheless, avoid for now; likely further acute kidney injury at admission August 17 associated with dehydration/prerenal/ATN etiology     --History MRSA;  Not in current culture     --Diabetes mellitus type 2, uncontrolled.  He reports the reason for this is forgetfulness     --History Johnston and chronic liver disease per past notes     --Left BKA     --Peripheral vascular disease        PLAN:     --IV zosyn/mycamine     --Check/review labs cultures and scans     --Discussed with microbiology     --History per nursing staff     --Discussed with Dr Porras     --Discussed with family, partial history per them     --Highly complex set of issues with high risk for further serious morbidity and other serious sequela     --Colorectal surgery following to help guide potential for further intervention related to perirectal abscess versus further work-up versus observation or other;  Has had 2 CT scans, ?if persistent perirectal fluid collection/inflammation or not; need clarification by Dr Elmer Dong MD  8/21/2019

## 2019-08-21 NOTE — PLAN OF CARE
Problem: Patient Care Overview  Goal: Plan of Care Review  Outcome: Ongoing (interventions implemented as appropriate)   08/21/19 0120   Coping/Psychosocial   Plan of Care Reviewed With patient   OTHER   Outcome Summary pt refusing to take night meds , VSS pt non compliant        Problem: Pain, Chronic (Adult)  Goal: Acceptable Pain/Comfort Level and Functional Ability  Outcome: Ongoing (interventions implemented as appropriate)      Problem: Skin Injury Risk (Adult)  Goal: Identify Related Risk Factors and Signs and Symptoms  Outcome: Ongoing (interventions implemented as appropriate)

## 2019-08-21 NOTE — PROGRESS NOTES
"    [facility] Medicine Services  PROGRESS NOTE    Patient Name: Kendrick Crystal  : 1970  MRN: [id number]    Date of Admission: 2019  Length of Stay: 4  Primary Care Physician: Keyanna Leone APRN    Subjective   Subjective     CC:  F/U N/V    HPI:  Patient seen this morning. Laying on his side in bed. Family at bedside today. He asks me to tell the nurse he needs help going to the bathroom, however then refuses to get up without the lift. Says his leg is weak and \"I just can't.\" Has been refusing meds and PT/OT as well.     Review of Systems  CV-no chest pain, no palpitations  Resp-no cough, no dyspnea  GI-no N/V/D, no abd pain    Objective   Objective     Vital Signs:   Temp:  [97.5 °F (36.4 °C)-98.4 °F (36.9 °C)] 98.4 °F (36.9 °C)  Heart Rate:  [75-77] 77  Resp:  [18] 18  BP: (132-167)/(68-97) 167/97        Physical Exam:  Gen-no acute distress  HENT-NCAT, mucous membranes moist  CV-RRR, S1 S2 normal, no m/r/g  Resp-CTAB, no wheezes or rales  Abd-soft, NT, ND, +BS  Ext-no edema  Neuro-awake, alert, but not wanting to engage in conversation with me, no focal deficits  Skin-no rashes  Psych-appropriate mood      Results Reviewed:    Results from last 7 days   Lab Units 19  0430 19  0943 19  0531   WBC 10*3/mm3 12.61* 12.85* 12.76*   HEMOGLOBIN g/dL 12.1* 12.2* 11.6*   HEMATOCRIT % 37.5 36.6* 36.4*   PLATELETS 10*3/mm3 219 213 227     Results from last 7 days   Lab Units 19  0430 19  1017 19  0531 19  1625   SODIUM mmol/L 142 143 134* 137   POTASSIUM mmol/L 3.3* 3.2* 3.5 4.5   CHLORIDE mmol/L 102 102 95* 90*   CO2 mmol/L 28.0 29.0 23.0 29.0   BUN mg/dL 23* 26* 35* 35*   CREATININE mg/dL 1.59* 1.60* 1.93* 1.91*   GLUCOSE mg/dL 103* 183* 278* 296*   CALCIUM mg/dL 8.4* 8.6 7.6* 9.0   ALT (SGPT) U/L  --   --   --  17   AST (SGOT) U/L  --   --   --  35     Estimated Creatinine Clearance: 72.9 mL/min (A) (by C-G formula based on SCr of 1.59 " mg/dL (H)).    Microbiology Results Abnormal     Procedure Component Value - Date/Time    Blood Culture - Blood, Arm, Left [[id number]] Collected:  08/17/19 1735    Lab Status:  Preliminary result Specimen:  Blood from Arm, Left Updated:  08/20/19 1800     Blood Culture No growth at 3 days    Blood Culture - Blood, Hand, Left [[id number]] Collected:  08/17/19 1745    Lab Status:  Preliminary result Specimen:  Blood from Hand, Left Updated:  08/20/19 1800     Blood Culture No growth at 3 days    Urine Culture - Urine, Urine, Clean Catch [[id number]]  (Abnormal) Collected:  08/18/19 0407    Lab Status:  Final result Specimen:  Urine, Clean Catch Updated:  08/19/19 1401     Urine Culture >100,000 CFU/mL Yeast, Not Candida albicans    Urine Culture - Urine, Urine, Catheter [[id number]]  (Normal) Collected:  08/17/19 1729    Lab Status:  Final result Specimen:  Urine, Catheter Updated:  08/19/19 0932     Urine Culture No growth          Imaging Results (last 24 hours)     ** No results found for the last 24 hours. **          Results for orders placed during the hospital encounter of 05/18/19   Adult Transthoracic Echo Complete W/ Cont if Necessary Per Protocol    Narrative · Estimated EF = 60%.  · Mild mitral valve regurgitation is present  · Mild tricuspid valve regurgitation is present.  · Calculated right ventricular systolic pressure from tricuspid   regurgitation is 29 mmHg.          I have reviewed the medications:  Scheduled Meds:    amLODIPine 10 mg Oral Q24H   docusate sodium 100 mg Oral BID   DULoxetine 30 mg Oral Daily   famotidine 20 mg Intravenous Q12H   heparin (porcine) 5,000 Units Subcutaneous Q12H   hydrALAZINE 100 mg Oral Q8H   insulin detemir 15 Units Subcutaneous Q12H   insulin lispro 0-7 Units Subcutaneous 4x Daily With Meals & Nightly   insulin lispro 5 Units Subcutaneous TID With Meals   metoprolol tartrate 100 mg Oral Q12H   micafungin (MYCAMINE)  mg Intravenous Q24H   piperacillin-tazobactam  3.375 g Intravenous Q8H   saccharomyces boulardii 250 mg Oral BID   sevelamer 800 mg Oral TID With Meals   sodium chloride 3 mL Intravenous Q12H   tamsulosin 0.8 mg Oral Nightly   terazosin 5 mg Oral Nightly     Continuous Infusions:   PRN Meds:.•  acetaminophen **OR** acetaminophen **OR** acetaminophen  •  ALPRAZolam  •  bisacodyl  •  dextrose  •  dextrose  •  glucagon (human recombinant)  •  HYDROcodone-acetaminophen  •  LORazepam  •  ondansetron  •  QUEtiapine  •  sodium chloride  •  sodium chloride      Assessment/Plan   Assessment / Plan     Active Hospital Problems    Diagnosis  POA   • **Intractable nausea and vomiting [R11.2]  Yes   • Perirectal abscess I&D 8/2/19 [K61.1]  Yes   • Dehydration [E86.0]  Yes   • Gastroparesis [K31.84]  Yes   • Leukocytosis [D72.829]  Yes   • Sepsis (CMS/HCC) [A41.9]  Yes   • Obesity (BMI 30-39.9) [E66.9]  Yes   • DM (diabetes mellitus) type II uncontrolled, periph vascular disorder (CMS/HCC) [E11.51, E11.65]  Yes   • S/P BKA (below knee amputation), left (CMS/HCC) [Z89.512]  Not Applicable   • Essential hypertension [I10]  Yes   • DUNLAP Cirrhosis [K74.60]  Yes      Resolved Hospital Problems    Diagnosis Date Resolved POA   • Lactic acidosis [E87.2] 08/18/2019 Yes        Brief Hospital Course to date:  Kendrick Crystal is a 48 y.o. male with hx of uncontrolled 2 diabetes recently admitted from 7/30/2019 until 8/14/2019 with sepsis, perirectal abscess as well as rectal fistula that underwent operative drainage on 8/2/2019 with Dr. Payne. Cultures returned positive for E. coli and Klebsiella and he was treated with IV antibiotics per Dr. Dong. He was discharged on 8/14/2019 with oral Ceftin and Flagyl. He then developed extreme nausea and vomiting and has had difficulty keeping his antibiotics down. Workup in the ER revealed worsening leukocytosis and lactic acidosis. UA consistent with infection. CT A/P showed increase in fluid collection density concerning for recurrent  abscess.    PLAN:  --Urine culture growing yeast. Continue Zosyn and Micafungin per ID.   --Dr. Sunshine/Dr. Payne following. Unclear if abscess is even still present, was not even mentioned on most recent CT scan. Await further recs from Dr. Payne. If no intervention is needed, can likely simplify ATBx and get patient home.   --Renal function continues to improve since last admission. Monitor.     Patient has been noncompliant and refusing care. Continuing to encourage him to participate in PT/OT and his own transfers to his wheelchair, as he typically does at home.     DVT Prophylaxis:  [facility]    Disposition: I expect the patient to be discharged TBD    CODE STATUS:   Code Status and Medical Interventions:   Ordered at: 08/17/19 8664     Level Of Support Discussed With:    Patient     Code Status:    CPR     Medical Interventions (Level of Support Prior to Arrest):    Full         Electronically signed by Jacqueline Porras MD, 08/21/19, 11:50 AM.

## 2019-08-21 NOTE — PLAN OF CARE
Problem: Patient Care Overview  Goal: Plan of Care Review  Outcome: Ongoing (interventions implemented as appropriate)   08/21/19 1535   Coping/Psychosocial   Plan of Care Reviewed With patient   Plan of Care Review   Progress no change   OTHER   Outcome Summary Pt. still noncompliant, refusing some medications and insisting using a lift for mobility transfers. VSS. Receiving ABX. Will continue to monitor       Problem: Skin Injury Risk (Adult)  Goal: Identify Related Risk Factors and Signs and Symptoms  Outcome: Ongoing (interventions implemented as appropriate)   08/21/19 9025   Skin Injury Risk (Adult)   Related Risk Factors (Skin Injury Risk) body weight extremes;infection;mobility impaired;nutritional deficiencies

## 2019-08-21 NOTE — PROGRESS NOTES
Kendrick Crystal     LOS: 4 days     Subjective   Patient feels well.  Sitting up.  Tolerating food.  Denies perineal pain.      Objective     Vital Signs  Vitals:    08/21/19 1607   BP: 160/94   Pulse: 78   Resp: 18   Temp: 98.6 °F (37 °C)   SpO2:        I/O  Intake & Output (last day)       08/21 0701 - 08/22 0700    P.O. 600    Total Intake(mL/kg) 600 (5.7)    Urine (mL/kg/hr) 750 (0.6)    Stool 0    Total Output 750    Net -150         Stool Unmeasured Occurrence 1 x             Results Review:       WBC WBC   Date Value Ref Range Status   08/21/2019 12.61 (H) 3.40 - 10.80 10*3/mm3 Final   08/20/2019 12.85 (H) 3.40 - 10.80 10*3/mm3 Final   08/19/2019 12.76 (H) 3.40 - 10.80 10*3/mm3 Final      HGB Hemoglobin   Date Value Ref Range Status   08/21/2019 12.1 (L) 13.0 - 17.7 g/dL Final   08/20/2019 12.2 (L) 13.0 - 17.7 g/dL Final   08/19/2019 11.6 (L) 13.0 - 17.7 g/dL Final      HCT Hematocrit   Date Value Ref Range Status   08/21/2019 37.5 37.5 - 51.0 % Final   08/20/2019 36.6 (L) 37.5 - 51.0 % Final   08/19/2019 36.4 (L) 37.5 - 51.0 % Final      PLT        Results from last 7 days   Lab Units 08/21/19  0430   PLATELETS 10*3/mm3 219            Sodium Sodium   Date Value Ref Range Status   08/21/2019 142 136 - 145 mmol/L Final   08/20/2019 143 136 - 145 mmol/L Final   08/19/2019 134 (L) 136 - 145 mmol/L Final      Potassium Potassium   Date Value Ref Range Status   08/21/2019 3.3 (L) 3.5 - 5.2 mmol/L Final   08/20/2019 3.2 (L) 3.5 - 5.2 mmol/L Final   08/19/2019 3.5 3.5 - 5.2 mmol/L Final      Chloride Chloride   Date Value Ref Range Status   08/21/2019 102 98 - 107 mmol/L Final   08/20/2019 102 98 - 107 mmol/L Final   08/19/2019 95 (L) 98 - 107 mmol/L Final      Bicarbonate No results found for: PLASMABICARB   BUN BUN   Date Value Ref Range Status   08/21/2019 23 (H) 6 - 20 mg/dL Final   08/20/2019 26 (H) 6 - 20 mg/dL Final   08/19/2019 35 (H) 6 - 20 mg/dL Final      Creatinine Creatinine   Date Value Ref Range Status    08/21/2019 1.59 (H) 0.76 - 1.27 mg/dL Final   08/20/2019 1.60 (H) 0.76 - 1.27 mg/dL Final   08/19/2019 1.93 (H) 0.76 - 1.27 mg/dL Final      Calcium Calcium   Date Value Ref Range Status   08/21/2019 8.4 (L) 8.6 - 10.5 mg/dL Final   08/20/2019 8.6 8.6 - 10.5 mg/dL Final   08/19/2019 7.6 (L) 8.6 - 10.5 mg/dL Final      Magnesium No results found for: MG         Imaging Results (last 24 hours)     ** No results found for the last 24 hours. **          Assessment/Plan       Intractable nausea and vomiting    DUNLAP Cirrhosis    Essential hypertension    S/P BKA (below knee amputation), left (CMS/HCC)    DM (diabetes mellitus) type II uncontrolled, periph vascular disorder (CMS/HCC)    Obesity (BMI 30-39.9)    Sepsis (CMS/HCC)    Perirectal abscess I&D 8/2/19    Dehydration    Gastroparesis    Leukocytosis    Have reviewed CT scans.  Patient still has a cavity where he had his previous abscess but it looks reasonably well drained.  However reviewed these studies with radiology tomorrow.  If they agree he should not need he needs any further colorectal surgical intervention this admission.      Mumtaz Payne MD  08/21/19  7:34 PM

## 2019-08-22 PROBLEM — R33.9 URINARY RETENTION: Status: ACTIVE | Noted: 2019-08-22

## 2019-08-22 LAB
ANION GAP SERPL CALCULATED.3IONS-SCNC: 12 MMOL/L (ref 5–15)
BACTERIA SPEC AEROBE CULT: NORMAL
BACTERIA SPEC AEROBE CULT: NORMAL
BUN BLD-MCNC: 21 MG/DL (ref 6–20)
BUN/CREAT SERPL: 10.9 (ref 7–25)
CALCIUM SPEC-SCNC: 7.9 MG/DL (ref 8.6–10.5)
CHLORIDE SERPL-SCNC: 97 MMOL/L (ref 98–107)
CO2 SERPL-SCNC: 29 MMOL/L (ref 22–29)
CREAT BLD-MCNC: 1.92 MG/DL (ref 0.76–1.27)
DEPRECATED RDW RBC AUTO: 49 FL (ref 37–54)
ERYTHROCYTE [DISTWIDTH] IN BLOOD BY AUTOMATED COUNT: 15.6 % (ref 12.3–15.4)
GFR SERPL CREATININE-BSD FRML MDRD: 37 ML/MIN/1.73
GLUCOSE BLD-MCNC: 264 MG/DL (ref 65–99)
GLUCOSE BLDC GLUCOMTR-MCNC: 149 MG/DL (ref 70–130)
GLUCOSE BLDC GLUCOMTR-MCNC: 202 MG/DL (ref 70–130)
GLUCOSE BLDC GLUCOMTR-MCNC: 218 MG/DL (ref 70–130)
GLUCOSE BLDC GLUCOMTR-MCNC: 238 MG/DL (ref 70–130)
HCT VFR BLD AUTO: 34.6 % (ref 37.5–51)
HGB BLD-MCNC: 11.2 G/DL (ref 13–17.7)
MCH RBC QN AUTO: 27.9 PG (ref 26.6–33)
MCHC RBC AUTO-ENTMCNC: 32.4 G/DL (ref 31.5–35.7)
MCV RBC AUTO: 86.1 FL (ref 79–97)
PLATELET # BLD AUTO: 221 10*3/MM3 (ref 140–450)
PMV BLD AUTO: 13 FL (ref 6–12)
POTASSIUM BLD-SCNC: 3.6 MMOL/L (ref 3.5–5.2)
RBC # BLD AUTO: 4.02 10*6/MM3 (ref 4.14–5.8)
SODIUM BLD-SCNC: 138 MMOL/L (ref 136–145)
WBC NRBC COR # BLD: 12.43 10*3/MM3 (ref 3.4–10.8)

## 2019-08-22 PROCEDURE — 99233 SBSQ HOSP IP/OBS HIGH 50: CPT | Performed by: HOSPITALIST

## 2019-08-22 PROCEDURE — 82962 GLUCOSE BLOOD TEST: CPT

## 2019-08-22 PROCEDURE — 85027 COMPLETE CBC AUTOMATED: CPT | Performed by: HOSPITALIST

## 2019-08-22 PROCEDURE — 80048 BASIC METABOLIC PNL TOTAL CA: CPT | Performed by: HOSPITALIST

## 2019-08-22 PROCEDURE — 63710000001 INSULIN LISPRO (HUMAN) PER 5 UNITS: Performed by: NURSE PRACTITIONER

## 2019-08-22 PROCEDURE — 25010000002 PIPERACILLIN SOD-TAZOBACTAM PER 1 G: Performed by: NURSE PRACTITIONER

## 2019-08-22 PROCEDURE — 25010000002 HEPARIN (PORCINE) PER 1000 UNITS: Performed by: INTERNAL MEDICINE

## 2019-08-22 PROCEDURE — 63710000001 INSULIN DETEMIR PER 5 UNITS: Performed by: INTERNAL MEDICINE

## 2019-08-22 RX ADMIN — METOPROLOL TARTRATE 100 MG: 50 TABLET ORAL at 09:43

## 2019-08-22 RX ADMIN — AMLODIPINE BESYLATE 10 MG: 10 TABLET ORAL at 09:44

## 2019-08-22 RX ADMIN — INSULIN LISPRO 5 UNITS: 100 INJECTION, SOLUTION INTRAVENOUS; SUBCUTANEOUS at 17:06

## 2019-08-22 RX ADMIN — MICAFUNGIN SODIUM 100 MG: 20 INJECTION, POWDER, LYOPHILIZED, FOR SOLUTION INTRAVENOUS at 09:49

## 2019-08-22 RX ADMIN — INSULIN DETEMIR 15 UNITS: 100 INJECTION, SOLUTION SUBCUTANEOUS at 09:50

## 2019-08-22 RX ADMIN — HYDRALAZINE HYDROCHLORIDE 100 MG: 50 TABLET, FILM COATED ORAL at 05:21

## 2019-08-22 RX ADMIN — INSULIN LISPRO 3 UNITS: 100 INJECTION, SOLUTION INTRAVENOUS; SUBCUTANEOUS at 21:00

## 2019-08-22 RX ADMIN — INSULIN LISPRO 3 UNITS: 100 INJECTION, SOLUTION INTRAVENOUS; SUBCUTANEOUS at 09:44

## 2019-08-22 RX ADMIN — FAMOTIDINE 20 MG: 10 INJECTION, SOLUTION INTRAVENOUS at 21:00

## 2019-08-22 RX ADMIN — Medication 250 MG: at 20:58

## 2019-08-22 RX ADMIN — INSULIN LISPRO 5 UNITS: 100 INJECTION, SOLUTION INTRAVENOUS; SUBCUTANEOUS at 11:43

## 2019-08-22 RX ADMIN — FAMOTIDINE 20 MG: 10 INJECTION, SOLUTION INTRAVENOUS at 09:43

## 2019-08-22 RX ADMIN — METOPROLOL TARTRATE 100 MG: 50 TABLET ORAL at 20:59

## 2019-08-22 RX ADMIN — TAZOBACTAM SODIUM AND PIPERACILLIN SODIUM 3.38 G: 375; 3 INJECTION, SOLUTION INTRAVENOUS at 17:06

## 2019-08-22 RX ADMIN — TERAZOSIN HYDROCHLORIDE ANHYDROUS 5 MG: 5 CAPSULE ORAL at 20:58

## 2019-08-22 RX ADMIN — TAMSULOSIN HYDROCHLORIDE 0.8 MG: 0.4 CAPSULE ORAL at 20:59

## 2019-08-22 RX ADMIN — Medication 250 MG: at 09:44

## 2019-08-22 RX ADMIN — TAZOBACTAM SODIUM AND PIPERACILLIN SODIUM 3.38 G: 375; 3 INJECTION, SOLUTION INTRAVENOUS at 00:03

## 2019-08-22 RX ADMIN — TAZOBACTAM SODIUM AND PIPERACILLIN SODIUM 3.38 G: 375; 3 INJECTION, SOLUTION INTRAVENOUS at 09:50

## 2019-08-22 RX ADMIN — INSULIN LISPRO 3 UNITS: 100 INJECTION, SOLUTION INTRAVENOUS; SUBCUTANEOUS at 11:43

## 2019-08-22 RX ADMIN — SEVELAMER CARBONATE 0.8 G: 0.8 POWDER, FOR SUSPENSION ORAL at 11:43

## 2019-08-22 RX ADMIN — DULOXETINE HYDROCHLORIDE 30 MG: 30 CAPSULE, DELAYED RELEASE ORAL at 09:43

## 2019-08-22 RX ADMIN — SEVELAMER CARBONATE 0.8 G: 0.8 POWDER, FOR SUSPENSION ORAL at 17:05

## 2019-08-22 RX ADMIN — SODIUM CHLORIDE, PRESERVATIVE FREE 3 ML: 5 INJECTION INTRAVENOUS at 09:45

## 2019-08-22 RX ADMIN — HEPARIN SODIUM 5000 UNITS: 5000 INJECTION INTRAVENOUS; SUBCUTANEOUS at 09:43

## 2019-08-22 RX ADMIN — INSULIN LISPRO 5 UNITS: 100 INJECTION, SOLUTION INTRAVENOUS; SUBCUTANEOUS at 09:43

## 2019-08-22 RX ADMIN — HEPARIN SODIUM 5000 UNITS: 5000 INJECTION INTRAVENOUS; SUBCUTANEOUS at 21:00

## 2019-08-22 RX ADMIN — DOCUSATE SODIUM 100 MG: 100 CAPSULE, LIQUID FILLED ORAL at 20:59

## 2019-08-22 RX ADMIN — HYDRALAZINE HYDROCHLORIDE 100 MG: 50 TABLET, FILM COATED ORAL at 21:00

## 2019-08-22 RX ADMIN — HYDRALAZINE HYDROCHLORIDE 100 MG: 50 TABLET, FILM COATED ORAL at 17:06

## 2019-08-22 RX ADMIN — SEVELAMER CARBONATE 0.8 G: 0.8 POWDER, FOR SUSPENSION ORAL at 09:44

## 2019-08-22 NOTE — PROGRESS NOTES
Kendrick Crystal     LOS: 5 days     Subjective     Patient denies perineal pain.  Tolerating diet.    Objective     Vital Signs  Vitals:    08/22/19 0720   BP: 147/87   Pulse: 78   Resp: 18   Temp: 98.5 °F (36.9 °C)   SpO2:        I/O  Intake & Output (last day)       08/21 0701 - 08/22 0700 08/22 0701 - 08/23 0700    P.O. 1200     Total Intake(mL/kg) 1200 (11.4)     Urine (mL/kg/hr) 2250 (0.9)     Stool 0     Total Output 2250     Net -1050           Stool Unmeasured Occurrence 1 x           Physical Exam:  Abdomen soft and nontender.  No perineal tenderness.         Results Review:       WBC WBC   Date Value Ref Range Status   08/22/2019 12.43 (H) 3.40 - 10.80 10*3/mm3 Final   08/21/2019 12.61 (H) 3.40 - 10.80 10*3/mm3 Final   08/20/2019 12.85 (H) 3.40 - 10.80 10*3/mm3 Final      HGB Hemoglobin   Date Value Ref Range Status   08/22/2019 11.2 (L) 13.0 - 17.7 g/dL Final   08/21/2019 12.1 (L) 13.0 - 17.7 g/dL Final   08/20/2019 12.2 (L) 13.0 - 17.7 g/dL Final      HCT Hematocrit   Date Value Ref Range Status   08/22/2019 34.6 (L) 37.5 - 51.0 % Final   08/21/2019 37.5 37.5 - 51.0 % Final   08/20/2019 36.6 (L) 37.5 - 51.0 % Final      PLT        Results from last 7 days   Lab Units 08/22/19  0510   PLATELETS 10*3/mm3 221            Sodium Sodium   Date Value Ref Range Status   08/22/2019 138 136 - 145 mmol/L Final   08/21/2019 142 136 - 145 mmol/L Final   08/20/2019 143 136 - 145 mmol/L Final      Potassium Potassium   Date Value Ref Range Status   08/22/2019 3.6 3.5 - 5.2 mmol/L Final   08/21/2019 3.3 (L) 3.5 - 5.2 mmol/L Final   08/20/2019 3.2 (L) 3.5 - 5.2 mmol/L Final      Chloride Chloride   Date Value Ref Range Status   08/22/2019 97 (L) 98 - 107 mmol/L Final   08/21/2019 102 98 - 107 mmol/L Final   08/20/2019 102 98 - 107 mmol/L Final      Bicarbonate No results found for: PLASMABICARB   BUN BUN   Date Value Ref Range Status   08/22/2019 21 (H) 6 - 20 mg/dL Final   08/21/2019 23 (H) 6 - 20 mg/dL Final    08/20/2019 26 (H) 6 - 20 mg/dL Final      Creatinine Creatinine   Date Value Ref Range Status   08/22/2019 1.92 (H) 0.76 - 1.27 mg/dL Final   08/21/2019 1.59 (H) 0.76 - 1.27 mg/dL Final   08/20/2019 1.60 (H) 0.76 - 1.27 mg/dL Final      Calcium Calcium   Date Value Ref Range Status   08/22/2019 7.9 (L) 8.6 - 10.5 mg/dL Final   08/21/2019 8.4 (L) 8.6 - 10.5 mg/dL Final   08/20/2019 8.6 8.6 - 10.5 mg/dL Final      Magnesium No results found for: MG         Imaging Results (last 24 hours)     ** No results found for the last 24 hours. **          Assessment/Plan       Intractable nausea and vomiting    DUNLAP Cirrhosis    Essential hypertension    S/P BKA (below knee amputation), left (CMS/HCC)    DM (diabetes mellitus) type II uncontrolled, periph vascular disorder (CMS/HCC)    Obesity (BMI 30-39.9)    Sepsis (CMS/HCC)    Perirectal abscess I&D 8/2/19    Dehydration    Gastroparesis    Leukocytosis      Have reviewed CT scans with Dr. Akbar, radiology.  The patient's abscess has shrunk in size and according to the most recent CT scan has been completely evacuated.  The patient does not need any further intervention from my perspective at this time.    Mumtaz Payne MD  08/22/19  8:24 AM

## 2019-08-22 NOTE — PROGRESS NOTES
Continued Stay Note  [facility]     Patient Name: Kendrick Crystal  MRN: [id number]  Today's Date: 8/22/2019    Admit Date: 8/17/2019    Discharge Plan     Row Name 08/22/19 1336       Plan    Plan  Home with HH    Patient/Family in Agreement with Plan  yes    Plan Comments  Met with pt and wife at bedside to f/u DCP.  Goal remains to return home with [facility] (orders faxed today) upon DC.  Per wife, she is willing/able to transport.  No other immediate needs identified/voiced.  CM will cont to follow.    Final Discharge Disposition Code  06 - home with home health care        Discharge Codes    No documentation.       Expected Discharge Date and Time     Expected Discharge Date Expected Discharge Time    Aug 23, 2019             Christelle Carl

## 2019-08-22 NOTE — PLAN OF CARE
Problem: Patient Care Overview  Goal: Plan of Care Review  Outcome: Ongoing (interventions implemented as appropriate)   08/22/19 1630   Coping/Psychosocial   Plan of Care Reviewed With patient   Plan of Care Review   Progress no change   OTHER   Outcome Summary Pt. more compliant today and has been able to transfer to [facility] with assist of 2 today. Walker removed but patient still struggling with urinary retention. Plan to repeat labs in the AM. VSS. Will continue to monitor       Problem: Urinary Elimination/Continence Impairment (IRF) (Adult)  Goal: Optimal Management of Bladder Program, Effective Elimination  Outcome: Ongoing (interventions implemented as appropriate)   08/22/19 1630   Urinary Elimination/Continence Impairment (IRF) (Adult)   Optimal Management of Bladder Program, Effective Elimination progressing to functional independence

## 2019-08-22 NOTE — PROGRESS NOTES
[facility] Progress Note        Antibiotics:  Anti-Infectives (From admission, onward)    Ordered     Dose/Rate Route Frequency Start Stop    19 0735  micafungin 100 mg/100 mL 0.9% NS IVPB (mbp)     Ordering Provider:  Usman Dong MD    100 mg  over 60 Minutes Intravenous Every 24 Hours 19 0900 19 0859    19 1850  piperacillin-tazobactam (ZOSYN) 3.375 g in iso-osmotic dextrose 50 ml (premix)     Ordering Provider:  Mami Luz APRN    3.375 g  over 4 Hours Intravenous Every 8 Hours 19 0100 19 0058    19 1724  piperacillin-tazobactam (ZOSYN) 3.375 g in iso-osmotic dextrose 50 ml (premix)     Ordering Provider:  Foster Beebe MD    3.375 g Intravenous Once 19 1726 19 1827          CC: N/V    HPI:    Patient is a 48 y.o.  Yr old male with history of poorly contolled T2DM, DUNLAP/liver cirrhosis, HTN, PVD/Left BKA, and multiple skin abscesses/MRSA who presented to [facility] ED with right shin wound infection summer 2018.  He was unable to get to see Dr. Martinez as his father passed away unexpectedly on 18 and he had to wait until after the .  The wound worsened becoming gangrenous and he came to [facility] ED in 2018.  He also had been hospitalized at [facility] and then transferred to  for a severe penis/scrotum infection requiring surgical debridement in summer 2018.  He received antibiotics regarding his right leg infection, generally improved over the remainder of summer 2018 but that area had not completely healed. He was admitted 2019 with acute left lower abdominal wall redness/swelling and pain, spontaneous drainage associated with fever/chills and uncontrolled blood sugars.   I&D requiring wide debridement , severe/deep infection and transferred to [facility] on May 3 with IV Zosyn/oral doxycycline. Cultures had lactobacillus and mixed gram-positive skin sherly including alpha strep, staph epidermidis and  corynebacterium. Readmitted to [facility] May 18, 2019, increasing generalized edema ultimately transitioned to oral doxycycline and healed.     He presented to the emergency room July 30, 2019 with acute rectal pain that had begun 7/27; this is associated with constipation for days, chills and nausea/dry heaving.  White blood cell count elevated, high hemoglobin A1c over 13, urinalysis with hematuria/pyuria and CT scan with 3 x 3 cm fluid collection anterior to the distal rectum and per discussion with Dr. Akbar/Jennifer, this is not in the prostate.  Colorectal surgery/urology saw him for consideration of surgical options/timing/threshold, etc..     8/2/19 I&Dper Dr Payne perirectal abscess; patient reports that he had instrumented his rectum prior to hospitalization with enema     8/3/19 urinary retention overnight requiring in/out catheterization per patient.  Creat climb     8/4/19 further creat climb; childs placed and lisinopril stopped and d/w Dr Rubio;  ?obstruction initially associated with retention postop (1200 out with I/O cath postop per nursing) -v- contrast from CT -v- lisinopril/medication/vancomycin -v- relative hypotension -v- likely combination of factors with ATN; nephrology following; vancomycin trough high and vancomycin stopped empirically 8/3 although high trough potentially accumulation as a consequence of diminishing renal function associated with retention/contrast/pressure rather than cause.  Nonetheless, avoid for now; creatinine trending down.         8/7 in retrospect he remembers air in his urine at admit which has raised concern for possible fistula from urinary tract;  No fecaluria and culture from abscess is fecal sherly;  ?rectal-urethral fistula;  Dr Payne aware     Culture with E. coli/Klebsiella species and creatinine generally trended down, transitioned to oral antibiotics at discharge.     Readmitted August 17, 2019 with nausea/vomiting/dry heaves for 3 days.  In  "addition he had diminishing urine output and difficulty emptying his bladder per his report.  No overt dysuria and denies seeing any blood or pyuria.  No flank pain.     Walker catheter was placed and CT scan of the abdomen showed:   \"Previously seen fluid collection identified inferior to the prostate gland is more increased in density on today's examination. There is wall thickening again seen throughout the bladder. Findings again are concerning for cystitis.\" per radiology    8/22/19 denies N/V and no diarrhea.  No fevers chills or sweats.  No rash.  Denies perirectal pain.  Some irritation in the groin associated with yeast     No headache photophobia or neck stiffness.  No shortness of breath cough or hemoptysis.  No diarrhea.  No hematochezia melena or hematemesis.  No skin rash.         ROS:      8/22/19 No f/c/s. No n/v/d.  No new ADR to Abx.       Constitutional-- No Fever, chills or sweats.  Appetite diminished with fatigue  Heent-- No new vision, hearing or throat complaints.  No epistaxis or oral sores.  Denies odynophagia or dysphagia.  No flashers, floaters or eye pain. No odynophagia or dysphagia. No headache, photophobia or neck stiffness.  CV-- No chest pain, palpitation or syncope  Resp-- No SOB/cough/Hemoptysis  GI- No  diarrhea.  No hematochezia, melena, or hematemesis. Denies jaundice or chronic liver disease.  -- No dysuria, hematuria, or flank pain.  Denies hesitancy, urgency or flank pain.  Lymph- no swollen lymph nodes in neck/axilla or groin.   Heme- No active bruising or bleeding; no Hx of DVT or PE.  MS-- no swelling or pain in the bones or joints of arms/legs.  No new back pain.  Neuro-- No acute focal weakness or numbness in the arms or legs.  No seizures.     Full 12 point review of systems reviewed and negative otherwise for acute complaints, except for above          PE:   /87 (BP Location: Left arm, Patient Position: Lying)   Pulse 78   Temp 98.5 °F (36.9 °C) (Oral)   " "Resp 18   Ht 190.5 cm (75\")   Wt 105 kg (230 lb 9.6 oz)   SpO2 94%   BMI 28.82 kg/m²       GENERAL: awake, in no acute distress.   HEENT: Normocephalic, atraumatic.  PERRL. EOMI. No conjunctival injection. No icterus. Oropharynx clear without evidence of thrush or exudate. No evidence of peridontal disease.    NECK: Supple without nuchal rigidity. No mass.  LYMPH: No cervical, axillary or inguinal lymphadenopathy.  HEART: RRR; No murmur, rubs, gallops.   LUNGS: Diminished at bases bilaterally without wheezing, rales, rhonchi. Normal respiratory effort. Nonlabored. No dullness.  ABDOMEN: Soft, nontender, nondistended. Positive bowel sounds. No rebound or guarding. NO mass or HSM.  EXT:  No cyanosis, clubbing or edema. No cord.  : Some vague irritation with satellite lesions consistent with cutaneous candidiasis.  + Walker catheter.  MSK: FROM without joint effusions noted arms/legs.    SKIN: Warm and dry without cutaneous eruptions on Inspection/palpation.    NEURO: awake     Amputee status noted with left BKA     No peripheral stigmata/phenomena of endocarditis           Laboratory Data    Results from last 7 days   Lab Units 08/22/19  0510 08/21/19  0430 08/20/19  0943   WBC 10*3/mm3 12.43* 12.61* 12.85*   HEMOGLOBIN g/dL 11.2* 12.1* 12.2*   HEMATOCRIT % 34.6* 37.5 36.6*   PLATELETS 10*3/mm3 221 219 213     Results from last 7 days   Lab Units 08/22/19  0510   SODIUM mmol/L 138   POTASSIUM mmol/L 3.6   CHLORIDE mmol/L 97*   CO2 mmol/L 29.0   BUN mg/dL 21*   CREATININE mg/dL 1.92*   GLUCOSE mg/dL 264*   CALCIUM mg/dL 7.9*     Results from last 7 days   Lab Units 08/17/19  1625   ALK PHOS U/L 168*   BILIRUBIN mg/dL 0.4   ALT (SGPT) U/L 17   AST (SGOT) U/L 35         Results from last 7 days   Lab Units 08/19/19  0531   CRP mg/dL 0.62*       Estimated Creatinine Clearance: 60.4 mL/min (A) (by C-G formula based on SCr of 1.92 mg/dL (H)).      Microbiology:      Radiology:  Imaging Results (last 72 hours)     " Procedure Component Value Units Date/Time    CT Abdomen Pelvis Without Contrast [[id number]] Collected:  08/17/19 1848     Updated:  08/18/19 0947    Narrative:       EXAMINATION: CT ABDOMEN/PELVIS WO CONTRAST - 08/17/2019     INDICATION:  R53.1-Weakness; D72.829-Elevated white blood cell count,  unspecified; Z78.9-Other specified health status. Weakness, abdominal  pain.     TECHNIQUE: Multiple axial CT imaging is obtained of the abdomen and  pelvis without the administration of oral or intravenous contrast.     The radiation dose reduction device was turned on for each scan per the  ALARA (As Low as Reasonably Achievable) protocol.     COMPARISON: 07/30/2019     FINDINGS:      ABDOMEN: The lung bases are grossly clear. The liver is homogeneous in  appearance. No stones in the gallbladder. The spleen is unremarkable.  Pancreas is within normal limits. No abdominal retroperitoneal  lymphadenopathy. The kidneys and adrenal glands are stable with  bilateral myelolipomas present. No free fluid or free air. The appendix  is radiographically normal in appearance. No abnormal mass or fluid  collections identified. The abdominal portion of the gastrointestinal  tract is within normal limits.     PELVIS: The pelvic organs reveal abnormal wall thickening of the  bladder. There is incomplete distention. Cystitis cannot be excluded.  There is stool seen within the rectum. No definite abscess collection  identified on today's examination. The pelvic portion of the  gastrointestinal tract is within normal limits. No free fluid or free  air. No abnormal mass or fluid collections identified. Previously seen  fluid collection inferior to the prostate is more increased in density  on today's examination. No definite abnormal fluid collection  identified.       Impression:       Previously seen fluid collection identified inferior to the  prostate gland is more increased in density on today's examination.  There is wall thickening  again seen throughout the bladder. Findings  again are concerning for cystitis. Cystography can be performed for  further evaluation. Remainder of the CT abdomen and pelvis is stable.     DICTATED:   08/17/2019  EDITED/ls :   08/17/2019         This report was finalized on 8/18/2019 9:44 AM by Dr. Nella Enriquez MD.       XR Chest 1 View [[id number]] Collected:  08/17/19 1842     Updated:  08/18/19 0947    Narrative:          EXAMINATION: XR CHEST, SINGLE VIEW - 08/17/2019     INDICATION:  R53.1-Weakness; D72.829-Elevated white blood cell count,  unspecified; Z78.9-Other specified health status.      COMPARISON: None.     FINDINGS: Portable chest reveals cardiac and mediastinal silhouettes  within normal limits. Bony structures are unremarkable. No pleural  effusion or pneumothorax. No focal parenchymal opacification present.          Impression:       No acute cardiopulmonary disease.     DICTATED:   08/17/2019  EDITED/ls :   08/17/2019      This report was finalized on 8/18/2019 9:44 AM by Dr. Nella Enriquez MD.       CT Abdomen Pelvis Without Contrast [[id number]] Collected:  08/18/19 0331     Updated:  08/18/19 0333    Narrative:       CT Abdomen Pelvis WO    INDICATION:   Abdominal pain and elevated white count    TECHNIQUE:   CT of the abdomen and pelvis without contrast. Coronal and sagittal reconstructions were obtained.  Radiation dose reduction techniques included automated exposure control or exposure modulation based on body size. Radiation audit for number of CT and  nuclear cardiology exams performed in the last year: 6.      COMPARISON:   8/17/2019 at 6:32 PM    FINDINGS:  Included lung bases are clear. The heart size is the upper limits of normal.    Abdomen: Noncontrasted imaging of the liver, gallbladder, spleen, pancreas and kidneys are within normal limits. Redemonstrated are bilateral adrenal nodules. The aorta is within normal limits. No threshold retroperitoneal adenopathy.    Pelvis:  The bowel pattern is nonobstructive. No free air. No focal inflammatory change. Walker catheter demonstrated in the bladder. No threshold pelvic or inguinal adenopathy.    Regional osseous structures show no acute or aggressive bone lesions. Degenerative changes in the lower lumbar spine.      Impression:         1.  Interval placement of Walker catheter in bladder from prior study.  2.  Bilateral adrenal nodules which are stable from prior study.          Signer Name: Shane Watts MD   Signed: 8/18/2019 3:31 AM   Workstation Name: [facility]    [facility]            Impression:     --Acute nausea/vomiting/dry heaves over 3 days preceding admission and sought medical attention August 17 per family.  Likely acute dehydration.  Unclear at present if this relates to UTI (although initial urinary culture negative 8/17) versus other intra-abdominal process versus medication (I.e. Flagyl) or other.  Currently abdominal exam is benign, no further vomiting.  Rehydration/nutrition per medicine at their discretion    --Acute cutaneous candidiasis,  area;  urinary culture from August 17 negative and I suspect that the urine culture from August 18 represents cutaneous contamination particularly in light of epithelial cells present in that 8/18 sample; I am not convinced for urinary tract infection so far.  Mycamine initiated for cutaneous disease and transition to topical antifungal at discharge.     --Acute perirectal abscess ; Colorectal surgery, Dr. Payne following to help guide timing/options/threshold for further drainage if needed.  Drainage as above on August 2 ;  Mixed Fecal sherly in culture; CT scans as above with no mention of abscess on 8/18 study;  Dr Payne has reviewed with radiology and most recent CT with complete evacuation of abscess     --History urinary retention.  Medicine following to help guide further work-up, urology input, etc. They will assess for adequacy of emptying and make  further plans if necessary;  I d/w Dr Porras     --ARF postop;  ?obstruction initially associated with retention postop (1200 out with I/O cath postop per nursing) -v- contrast from CT -v- lisinopril/medication/vancomycin -v- relative hypotension -v- likely combination of factors with ATN;  vancomycin trough high and vancomycin stopped empirically 8/3 although high trough potentially accumulation as a consequence of diminishing renal function associated with retention/contrast/pressure rather than cause.  Nonetheless, avoid for now; likely further acute kidney injury at admission August 17 associated with dehydration/prerenal/ATN etiology;  Creat trended up some 8/22 and I d/w Dr Porras;  She is facilitating further neph f/u and hydration regimen, outpatient plan, etc..     --History MRSA;  Not in current culture     --Diabetes mellitus type 2, uncontrolled.  He reports the reason for this is forgetfulness     --History Johnston and chronic liver disease per past notes     --Left BKA     --Peripheral vascular disease        PLAN:     --IV zosyn/mycamine while here but likley taper to oral omnicef/topical antifungal at discharge;  Patient adament about going home despite issue with rising creat,etc.. Dr Porras is helping forumlate plan for outpatient neph f/u and she is determining plan regarding urinary retention;  I have offered close f/u in office with stat labs 8/23 and encouraged hydration     --Check/review labs cultures and scans     --Discussed with microbiology     --History per nursing staff     --Discussed with Dr Porras as above     --Discussed with family, partial history per them     --Highly complex set of issues with high risk for further serious morbidity and other serious sequela     --Colorectal surgery following with Dr Payne's input noted           Usman Dong MD  8/22/2019

## 2019-08-22 NOTE — PLAN OF CARE
Problem: Pain, Chronic (Adult)  Goal: Identify Related Risk Factors and Signs and Symptoms  Outcome: Ongoing (interventions implemented as appropriate)   08/21/19 2236   Pain, Chronic (Adult)   Related Risk Factors (Chronic Pain) disease process;coping ineffective   Signs and Symptoms (Chronic Pain) fatigue/weakness;impaired thought process/concentration       Problem: Skin Injury Risk (Adult)  Goal: Identify Related Risk Factors and Signs and Symptoms  Outcome: Ongoing (interventions implemented as appropriate)   08/21/19 2236   Skin Injury Risk (Adult)   Related Risk Factors (Skin Injury Risk) body weight extremes;infection;mobility impaired;nutritional deficiencies

## 2019-08-22 NOTE — DISCHARGE PLACEMENT REQUEST
"GLORIA BURDICK,  RN    [phone]              Elliot Crystal (48 y.o. Male)     Date of Birth Social Security Number Address Home Phone MRN    1970  [facility] [address] [place] [address] [phone] [id number]    [employer]ondo[employer] Marital Status          Taoism        Admission Date Admission Type Admitting Provider Attending Provider Department, Room/Bed    8/17/19 Emergency Jacqueline Porras MD Reddy, Mayuri V, MD [facility] 4G, S448/1    Discharge Date Discharge Disposition Discharge Destination                       Attending Provider:  Jacqueline Porras MD    Allergies:  No Known Allergies    Isolation:  None   Infection:  MRSA (03/03/17)   Code Status:  CPR    Ht:  190.5 cm (75\")   Wt:  105 kg (230 lb 9.6 oz)    Admission Cmt:  None   Principal Problem:  Intractable nausea and vomiting [R11.2]                 Active Insurance as of 8/17/2019     Primary Coverage     Payor Plan Insurance Group Employer/Plan Group    MEDICARE MEDICARE A & B      Payor Plan Address Payor Plan Phone Number Payor Plan Fax Number Effective Dates    [address] [phone]  6/1/2017 - None Entered    [facility] [id number]       Subscriber Name Subscriber Birth Date Member ID       ELLIOT CRYSTAL 1970 [id number]           Secondary Coverage     Payor Plan Insurance Group Employer/Plan Group    [place]      Payor Plan Address Payor Plan Phone Number Payor Plan Fax Number Effective Dates    [address] [phone]  10/3/2017 - None Entered    [facility] [address]       Subscriber Name Subscriber Birth Date Member ID       ELLIOT CRYSTAL 1970 [id number]                 Emergency Contacts      (Rel.) Home Phone Work Phone Mobile Phone    Cindy Crystal (Spouse) [phone] -- [phone]                  [address] [address]  Phone:  [phone]  Fax:   Date: Aug 22, 2019 "      Ambulatory Referral to Home Health     Patient:  Kendrick Crystal MRN:  [id number]   [address] [place] [address] :  1970  SSN:    Phone: [phone] Sex:  M      INSURANCE PAYOR PLAN GROUP # SUBSCRIBER ID   Primary:  Secondary:    [place] [id number]  [id number]      [id number]  [id number]      Referring Provider Information:  BRE MATUTE Phone: [phone] Fax:       Referral Information:   # Visits:  1 Referral Type: Home Health [42]   Urgency:  Routine Referral Reason: Specialty Services Required   Start Date: Aug 22, 2019 End Date:  To be determined by Insurer   Diagnosis: S/P BKA (below knee amputation), left (CMS/[facility]) (Z89.512 [ICD-10-CM] V49.75 [ICD-9-CM])  Perirectal abscess (K61.1 [ICD-10-CM] 566 [ICD-9-CM])  Sepsis affecting skin (CMS/[facility]) (A41.9 [ICD-10-CM] 682.9 [ICD-9-CM])  Type 2 diabetes mellitus with diabetic peripheral angiopathy and gangrene, with long-term current use of insulin (CMS/[facility]) (E11.52,Z79.4 [ICD-10-CM] 250.70,443.81,785.4,V58.67 [ICD-9-CM])      Refer to Dept:   Refer to Provider:   Refer to Facility:       Face to Face Visit Date: 2019  Follow-up Provider for Plan of Care? I treated the patient in an acute care facility and will not continue treatment after discharge.  Follow-up Provider: GILLIAN VAZQUEZ [[id number]]  Reason/Clinical Findings: Acute cutaneous candidiasis  Describe mobility limitations that make leaving home difficult: Impaired functional mobility, gait, balance, endurance  Nursing/Therapeutic Services Requested: Skilled Nursing  Nursing/Therapeutic Services Requested: Physical Therapy  Nursing/Therapeutic Services Requested: Occupational Therapy  Nursing/Therapeutic Services Requested: Medical / Social Work  Skilled nursing orders: Medication education  Skilled nursing orders: Cardiopulmonary assessments  PT orders: Therapeutic exercise (LBKA)  PT orders: Gait Training  PT orders: Strengthening  Weight Bearing  Status: As Tolerated  Frequency: 1 Week 1     This document serves as a request of services and does not constitute Insurance authorization or approval of services.  To determine eligibility, please contact the members Insurance carrier to verify and review coverage.     If you have medical questions regarding this request for services. Please contact [address] at [phone] between the hours of 8:00am - 5:00pm (Mon-Fri).       Verbal Order Mode: Per protocol: cosign required  Authorizing Provider: Jacqueline Porras MD  Authorizing Provider's NPI: [id number]     Order Entered By: Christelle Carl 2019  8:53 AM     Electronically signed by: Jacqueline Porras MD 2019  1:27 PM                   History & Physical      Alexander Flowers, DO at 2019  6:22 PM              [facility] Medicine Services  HISTORY AND PHYSICAL    Patient Name: Kendrick Crystal  : 1970  MRN: [id number]  Primary Care Physician: Keyanna Leone APRN  Date of admission: 2019      Subjective   Subjective     Chief Complaint:  Intractable nausea and vomiting    HPI:  Kendrick Cyrstal is a 48 y.o. male with uncontrolled 2 diabetes recently admitted from 2019 until 2019 with sepsis, perirectal rectal abscess as well as rectal fistula that underwent her operative drainage on 2019 with Dr. Mensah.  Cultures returned positive for E. coli and Klebsiella and was treated with IV antibiotics per Dr. Carolina.  He was discharged on 2019 with oral Ceftin and Flagyl.  Upon returning home patient developed extreme nausea and vomiting within hours.  Patient call primary care provider who prescribed him Phenergan which did help to a degree.  However once medication wore off patient continued to have nausea and vomiting.  States he has had very little intake and unable to take antibiotics or keep down other regularly scheduled medications.  He denies fever, increasing abdominal pain,  diarrhea.  He does endorse difficulty urinating, lightheadedness/intermittent dizziness.    Work-up in ED reveals increasing white count, lactate 3.7, creatinine 1.91 which is down from 2.58 on 8/14/2019.  Currently CT abdomen and pelvis is pending as well as chest x-ray.  UA with large leukocytes and too numerous to count WBCs/bacteria.  Patient to be admitted for further management.    Review of Systems   Constitutional: Positive for activity change, appetite change and fatigue. Negative for fever.   HENT: Negative.    Respiratory: Negative.    Cardiovascular: Negative.    Gastrointestinal: Positive for nausea and vomiting. Negative for abdominal pain and diarrhea.   Genitourinary: Positive for difficulty urinating, dysuria and frequency.   Musculoskeletal: Negative.    Skin: Negative.    Neurological: Positive for weakness and light-headedness.   Psychiatric/Behavioral: Negative.           All other systems reviewed and are negative.     Personal History     Past Medical History:   Diagnosis Date   • Abdominal wall cellulitis 5/20/2019   • JUAN (acute kidney injury) (CMS/HCC) 7/29/2018   • Arthritis    • Bipolar 1 disorder (CMS/HCC)    • Cellulitis of right anterior lower leg 7/29/2018   • Cirrhosis (CMS/HCC)    • Counseling for insulin pump    • Depression    • Diabetes mellitus (CMS/HCC)    • Encephalopathy, hepatic (CMS/HCC)    • H/O degenerative disc disease    • History of Lissa's gangrene     right leg/testicle   • Hyperlipemia    • Hypertension    • multiple Skin abscesses    • DUNLAP, bx showed stage IV fibrosis    • Neuropathy    • Peripheral vascular disease (CMS/HCC)    • Thrombophlebitis        Past Surgical History:   Procedure Laterality Date   • ABDOMINAL WALL ABSCESS INCISION AND DRAINAGE N/A 4/26/2019    Procedure: ABDOMINAL WALL DEBRIDEMENT;  Surgeon: Fadi Kern MD;  Location: [facility];  Service: General   • AMPUTATION DIGIT Left 1/30/2017    Procedure: left fourth and fifth  transmetatarsal toe amputation ;  Surgeon: Juancho Martinez MD;  Location:  [facility];  Service:    • BELOW KNEE AMPUTATION Left 3/2/2017    Procedure: AMPUTATION BELOW KNEE, SHMUEL;  Surgeon: Juancho Martinez MD;  Location:  [facility];  Service:    • ENDOSCOPY     • HEMORRHOIDECTOMY N/A 8/2/2019    Procedure: EXCISION AND DRAIN PERIRECTAL ABSCESS;  Surgeon: Mumtaz Payne MD;  Location:  [facility];  Service: General   • LEG SURGERY     • TESTICLE SURGERY Right     DEBRIDEMENT FROM GANGRENE   • TONSILLECTOMY  1975       Family History: family history includes Arthritis in his father and mother; Diabetes in his brother and father; Heart attack in his father; Hyperlipidemia in his father; Hypertension in his brother, father, and mother; Kidney disease in his mother; Mental illness in his sister; Obesity in his brother; Stroke in his mother. Otherwise pertinent FHx was reviewed and unremarkable.     Social History:  reports that he has never smoked. He has never used smokeless tobacco. He reports that he does not drink alcohol or use drugs.  Social History     Social History Narrative    Lives in [facility]       Medications:    Available home medication information reviewed.    (Not in a hospital admission)    No Known Allergies    Objective   Objective     Vital Signs:   Temp:  [97.9 °F (36.6 °C)] 97.9 °F (36.6 °C)  Heart Rate:  [] 86  Resp:  [16] 16  BP: (125-187)/() 187/107        Physical Exam   Constitutional: Awake, alert, appears ill  Eyes: PERRLA, sclerae anicteric, no conjunctival injection  HENT: NCAT, mucous membranes dry  Neck: Supple, no thyromegaly, no lymphadenopathy, trachea midline  Respiratory: Clear to auscultation bilaterally, nonlabored respirations   Cardiovascular: RRR, no murmurs, rubs, or gallops, palpable pedal pulses bilaterally  Gastrointestinal: Positive bowel sounds, soft, mild tenderness left side mid abdomen, nondistended  Musculoskeletal: Left BKA, no edema noted to right lower  extremity, no clubbing or cyanosis to extremities  Psychiatric: Appropriate affect, cooperative  Neurologic: Oriented x 3, strength symmetric in all extremities, Cranial Nerves grossly intact to confrontation, speech clear  Skin: No rashes    Results Reviewed:  I have personally reviewed current lab and radiology data.    Results from last 7 days   Lab Units 08/17/19  1625   WBC 10*3/mm3 16.28*   HEMOGLOBIN g/dL 14.0   HEMATOCRIT % 42.9   PLATELETS 10*3/mm3 305     Results from last 7 days   Lab Units 08/17/19  1625   SODIUM mmol/L 137   POTASSIUM mmol/L 4.5   CHLORIDE mmol/L 90*   CO2 mmol/L 29.0   BUN mg/dL 35*   CREATININE mg/dL 1.91*   GLUCOSE mg/dL 296*   CALCIUM mg/dL 9.0   ALT (SGPT) U/L 17   AST (SGOT) U/L 35   LACTATE mmol/L 3.7*     Estimated Creatinine Clearance: 61.7 mL/min (A) (by C-G formula based on SCr of 1.91 mg/dL (H)).  Brief Urine Lab Results  (Last result in the past 365 days)      Color   Clarity   Blood   Leuk Est   Nitrite   Protein   CREAT   Urine HCG        08/17/19 1729 Yellow Cloudy Trace Large (3+) Negative 30 mg/dL (1+)             Imaging Results (last 24 hours)     Procedure Component Value Units Date/Time    XR Chest 1 View [[id number]] Updated:  08/17/19 1743        Results for orders placed during the hospital encounter of 05/18/19   Adult Transthoracic Echo Complete W/ Cont if Necessary Per Protocol    Narrative · Estimated EF = 60%.  · Mild mitral valve regurgitation is present  · Mild tricuspid valve regurgitation is present.  · Calculated right ventricular systolic pressure from tricuspid   regurgitation is 29 mmHg.          Assessment/Plan   Assessment / Plan     Active Hospital Problems    Diagnosis POA   • **Intractable nausea and vomiting [R11.2] Unknown   • Lactic acidosis [E87.2] Unknown   • Perirectal abscess I&D 8/2/19 [K61.1] Unknown   • Dehydration [E86.0] Unknown   • Gastroparesis [K31.84] Unknown   • SIRS (systemic inflammatory response syndrome) (CMS/HCC) [R65.10]  Unknown   • Obesity (BMI 30-39.9) [E66.9] Yes   • DM (diabetes mellitus) type II uncontrolled, periph vascular disorder (CMS/[facility]) [E11.51, E11.65] Yes   • S/P BKA (below knee amputation), left (CMS/[facility]) [Z89.512] Not Applicable   • Acute kidney injury (CMS/[facility]) [N17.9] Unknown   • Essential hypertension [I10] Yes   • DUNLAP Cirrhosis [K74.60] Yes         --Intractable nausea vomiting with dehydration and lactic acidosis-likely related to dehydration.  IV fluid bolus in ED and continue maintenance IV fluids.  --Recent wound culture with Klebsiella and E. coli.  Start Zosyn IV antibiotic therapy.  Consult Dr. Dong, who followed during recent admission.  --Reimage abdomen with CT abdomen and pelvis, secondary to intractable nausea and vomiting and increasing leukocytosis.  Will start noncontrasted as creatinine still elevated.  Ask Dr. Leiva/colorectal to see in the morning  --Follow creatinine, continue IV fluid hydration.  Currently creatinine significantly improved from previous admission and trending downward from discharge date on 8/14/2019.  Appears baseline creatinine~ 0.8  --Continue antiemetics as needed, scheduled Reglan for history of gastroparesis  --Clear liquid diet for now  --Reduce scheduled home basal insulin and low-dose sliding scale  --PT/OT  --A.m. labs    DVT prophylaxis:  Heparin SQ    CODE STATUS:    Code Status and Medical Interventions:   Ordered at: 08/17/19 0014     Level Of Support Discussed With:    Patient     Code Status:    CPR     Medical Interventions (Level of Support Prior to Arrest):    Full       Admission Status:  I believe this patient meets INPATIENT status due to the need for care which can only be reasonably provided in an hospital setting due to his need for IV antibiotics as well as to be evaluated by multiple subspecialties with his chronic medical issues..  As such, I feel patient’s risk for adverse outcomes and need for care warrant INPATIENT evaluation and predict the  patient’s care encounter to likely last beyond 2 midnights.        Electronically signed by CHINTAN Gannon, 08/17/19, 6:22 PM.          I have seen and evaluate the patient.  I performed into the history and physical exam mention.  I agree with the above assessment plan.    Alexander Flowers DO  9:35 PM  8/17/19    Electronically signed by Alexander Flowers DO at 8/17/2019  9:36 PM

## 2019-08-22 NOTE — PROGRESS NOTES
[facility] Medicine Services  PROGRESS NOTE    Patient Name: Kendrick Crystal  : 1970  MRN: [id number]    Date of Admission: 2019  Length of Stay: 5  Primary Care Physician: Keyanna Leone APRN    Subjective   Subjective     CC:  F/U N/V    HPI:  Patient seen this morning. Still complains of leg being weak and giving out because his prosthesis does not fit/is loose. He says he has a new prosthesis waiting for him but he keeps coming into the hospital and can't go pick it up/get fitted for it. He is adamant about going home today.    Review of Systems  CV-no chest pain, no palpitations  Resp-no cough, no dyspnea  GI-no N/V/D, no abd pain    Objective   Objective     Vital Signs:   Temp:  [98.5 °F (36.9 °C)-98.6 °F (37 °C)] 98.5 °F (36.9 °C)  Heart Rate:  [78-79] 78  Resp:  [18] 18  BP: (142-160)/(83-94) 147/87        Physical Exam:  Gen-no acute distress  HENT-NCAT, mucous membranes moist  CV-RRR, S1 S2 normal, no m/r/g  Resp-CTAB, no wheezes or rales  Abd-soft, NT, ND, +BS  Ext-no edema; left BKA present  Neuro-A&Ox3, no focal deficits  Skin-no rashes  Psych-appropriate mood      Results Reviewed:    Results from last 7 days   Lab Units 19  0510 19  0430 19  0943   WBC 10*3/mm3 12.43* 12.61* 12.85*   HEMOGLOBIN g/dL 11.2* 12.1* 12.2*   HEMATOCRIT % 34.6* 37.5 36.6*   PLATELETS 10*3/mm3 221 219 213     Results from last 7 days   Lab Units 19  0510 19  0430 19  1017  19  1625   SODIUM mmol/L 138 142 143   < > 137   POTASSIUM mmol/L 3.6 3.3* 3.2*   < > 4.5   CHLORIDE mmol/L 97* 102 102   < > 90*   CO2 mmol/L 29.0 28.0 29.0   < > 29.0   BUN mg/dL 21* 23* 26*   < > 35*   CREATININE mg/dL 1.92* 1.59* 1.60*   < > 1.91*   GLUCOSE mg/dL 264* 103* 183*   < > 296*   CALCIUM mg/dL 7.9* 8.4* 8.6   < > 9.0   ALT (SGPT) U/L  --   --   --   --  17   AST (SGOT) U/L  --   --   --   --  35    < > = values in this interval not displayed.     Estimated  Creatinine Clearance: 60.4 mL/min (A) (by C-G formula based on SCr of 1.92 mg/dL (H)).    Microbiology Results Abnormal     Procedure Component Value - Date/Time    Blood Culture - Blood, Arm, Left [[id number]] Collected:  08/17/19 1735    Lab Status:  Preliminary result Specimen:  Blood from Arm, Left Updated:  08/21/19 1800     Blood Culture No growth at 4 days    Blood Culture - Blood, Hand, Left [[id number]] Collected:  08/17/19 1745    Lab Status:  Preliminary result Specimen:  Blood from Hand, Left Updated:  08/21/19 1800     Blood Culture No growth at 4 days    Urine Culture - Urine, Urine, Clean Catch [[id number]]  (Abnormal) Collected:  08/18/19 0407    Lab Status:  Final result Specimen:  Urine, Clean Catch Updated:  08/19/19 1401     Urine Culture >100,000 CFU/mL Yeast, Not Candida albicans    Urine Culture - Urine, Urine, Catheter [[id number]]  (Normal) Collected:  08/17/19 1729    Lab Status:  Final result Specimen:  Urine, Catheter Updated:  08/19/19 0932     Urine Culture No growth          Imaging Results (last 24 hours)     ** No results found for the last 24 hours. **          Results for orders placed during the hospital encounter of 05/18/19   Adult Transthoracic Echo Complete W/ Cont if Necessary Per Protocol    Narrative · Estimated EF = 60%.  · Mild mitral valve regurgitation is present  · Mild tricuspid valve regurgitation is present.  · Calculated right ventricular systolic pressure from tricuspid   regurgitation is 29 mmHg.          I have reviewed the medications:  Scheduled Meds:    amLODIPine 10 mg Oral Q24H   docusate sodium 100 mg Oral BID   DULoxetine 30 mg Oral Daily   famotidine 20 mg Intravenous Q12H   heparin (porcine) 5,000 Units Subcutaneous Q12H   hydrALAZINE 100 mg Oral Q8H   insulin detemir 15 Units Subcutaneous Q12H   insulin lispro 0-7 Units Subcutaneous 4x Daily With Meals & Nightly   insulin lispro 5 Units Subcutaneous TID With Meals   metoprolol tartrate 100 mg Oral Q12H    micafungin (MYCAMINE)  mg Intravenous Q24H   piperacillin-tazobactam 3.375 g Intravenous Q8H   saccharomyces boulardii 250 mg Oral BID   sevelamer 800 mg Oral TID With Meals   sodium chloride 3 mL Intravenous Q12H   tamsulosin 0.8 mg Oral Nightly   terazosin 5 mg Oral Nightly     Continuous Infusions:   PRN Meds:.•  acetaminophen **OR** acetaminophen **OR** acetaminophen  •  ALPRAZolam  •  bisacodyl  •  dextrose  •  dextrose  •  glucagon (human recombinant)  •  HYDROcodone-acetaminophen  •  LORazepam  •  ondansetron  •  QUEtiapine  •  sodium chloride  •  sodium chloride      Assessment/Plan   Assessment / Plan     Active Hospital Problems    Diagnosis  POA   • **Intractable nausea and vomiting [R11.2]  Yes   • Perirectal abscess I&D 8/2/19 [K61.1]  Yes   • Dehydration [E86.0]  Yes   • Gastroparesis [K31.84]  Yes   • Leukocytosis [D72.829]  Yes   • Sepsis (CMS/HCC) [A41.9]  Yes   • Obesity (BMI 30-39.9) [E66.9]  Yes   • DM (diabetes mellitus) type II uncontrolled, periph vascular disorder (CMS/HCC) [E11.51, E11.65]  Yes   • S/P BKA (below knee amputation), left (CMS/HCC) [Z89.512]  Not Applicable   • Essential hypertension [I10]  Yes   • DUNLAP Cirrhosis [K74.60]  Yes      Resolved Hospital Problems    Diagnosis Date Resolved POA   • Lactic acidosis [E87.2] 08/18/2019 Yes        Brief Hospital Course to date:  Kendrick Crystal is a 48 y.o. male with hx of uncontrolled 2 diabetes recently admitted from 7/30/2019 until 8/14/2019 with sepsis, perirectal abscess as well as rectal fistula that underwent operative drainage on 8/2/2019 with Dr. Payne. Cultures returned positive for E. coli and Klebsiella and he was treated with IV antibiotics per Dr. Dong. He was discharged on 8/14/2019 with oral Ceftin and Flagyl. He then developed extreme nausea and vomiting and has had difficulty keeping his antibiotics down. Workup in the ER revealed worsening leukocytosis and lactic acidosis. UA consistent with infection. CT A/P  showed increase in fluid collection density concerning for recurrent abscess.    PLAN:  --Urine culture growing yeast. Continue Zosyn and Micafungin per ID. Dr. Dong recommends switching to Omnicef + topical antifungal at discharge. He will follow up with patient in the office   --Dr. Payne has reviewed CT scans with Radiology, no evidence of remaining drainable abscess. No intervention required.  --Renal function continues to improve since last admission, although increased a bit today. Labs in AM.  --Walker removed, having some mild retention. Will continue Flomax, keep in hospital one more day to see if this improves. +/-Urology consult tomorrow if no improvement.     Patient has been noncompliant and refusing care. Continuing to encourage him to participate in PT/OT and his own transfers to his wheelchair, as he typically does at home. Plan d/c home tomorrow if labs stable and he can urinate without difficulty.    DVT Prophylaxis:  [facility]    Disposition: I expect the patient to be discharged home in 1 day    CODE STATUS:   Code Status and Medical Interventions:   Ordered at: 08/17/19 7292     Level Of Support Discussed With:    Patient     Code Status:    CPR     Medical Interventions (Level of Support Prior to Arrest):    Full         Electronically signed by Jacqueline Porras MD, 08/22/19, 3:29 PM.

## 2019-08-23 VITALS
HEIGHT: 75 IN | OXYGEN SATURATION: 94 % | TEMPERATURE: 98.2 F | WEIGHT: 230.6 LBS | DIASTOLIC BLOOD PRESSURE: 80 MMHG | SYSTOLIC BLOOD PRESSURE: 147 MMHG | HEART RATE: 78 BPM | RESPIRATION RATE: 18 BRPM | BODY MASS INDEX: 28.67 KG/M2

## 2019-08-23 PROBLEM — E86.0 DEHYDRATION: Status: RESOLVED | Noted: 2019-08-17 | Resolved: 2019-08-23

## 2019-08-23 PROBLEM — R11.2 INTRACTABLE NAUSEA AND VOMITING: Status: RESOLVED | Noted: 2019-05-30 | Resolved: 2019-08-23

## 2019-08-23 PROBLEM — D72.829 LEUKOCYTOSIS: Status: RESOLVED | Noted: 2019-08-17 | Resolved: 2019-08-23

## 2019-08-23 PROBLEM — K61.1 PERIRECTAL ABSCESS: Status: RESOLVED | Noted: 2019-08-17 | Resolved: 2019-08-23

## 2019-08-23 PROBLEM — A41.9 SEPSIS (HCC): Status: RESOLVED | Noted: 2019-07-30 | Resolved: 2019-08-23

## 2019-08-23 PROBLEM — R33.9 URINARY RETENTION: Status: RESOLVED | Noted: 2019-08-22 | Resolved: 2019-08-23

## 2019-08-23 LAB
ANION GAP SERPL CALCULATED.3IONS-SCNC: 15 MMOL/L (ref 5–15)
BUN BLD-MCNC: 21 MG/DL (ref 6–20)
BUN/CREAT SERPL: 13.2 (ref 7–25)
CALCIUM SPEC-SCNC: 8.2 MG/DL (ref 8.6–10.5)
CHLORIDE SERPL-SCNC: 99 MMOL/L (ref 98–107)
CO2 SERPL-SCNC: 26 MMOL/L (ref 22–29)
CREAT BLD-MCNC: 1.59 MG/DL (ref 0.76–1.27)
DEPRECATED RDW RBC AUTO: 46.9 FL (ref 37–54)
ERYTHROCYTE [DISTWIDTH] IN BLOOD BY AUTOMATED COUNT: 15.1 % (ref 12.3–15.4)
GFR SERPL CREATININE-BSD FRML MDRD: 46 ML/MIN/1.73
GLUCOSE BLD-MCNC: 162 MG/DL (ref 65–99)
GLUCOSE BLDC GLUCOMTR-MCNC: 191 MG/DL (ref 70–130)
HCT VFR BLD AUTO: 35.1 % (ref 37.5–51)
HGB BLD-MCNC: 11.4 G/DL (ref 13–17.7)
MCH RBC QN AUTO: 27.5 PG (ref 26.6–33)
MCHC RBC AUTO-ENTMCNC: 32.5 G/DL (ref 31.5–35.7)
MCV RBC AUTO: 84.6 FL (ref 79–97)
PLATELET # BLD AUTO: 246 10*3/MM3 (ref 140–450)
PMV BLD AUTO: 12.9 FL (ref 6–12)
POTASSIUM BLD-SCNC: 3.3 MMOL/L (ref 3.5–5.2)
RBC # BLD AUTO: 4.15 10*6/MM3 (ref 4.14–5.8)
SODIUM BLD-SCNC: 140 MMOL/L (ref 136–145)
WBC NRBC COR # BLD: 11.11 10*3/MM3 (ref 3.4–10.8)

## 2019-08-23 PROCEDURE — 63710000001 INSULIN LISPRO (HUMAN) PER 5 UNITS: Performed by: NURSE PRACTITIONER

## 2019-08-23 PROCEDURE — 80048 BASIC METABOLIC PNL TOTAL CA: CPT | Performed by: HOSPITALIST

## 2019-08-23 PROCEDURE — 63710000001 INSULIN DETEMIR PER 5 UNITS: Performed by: INTERNAL MEDICINE

## 2019-08-23 PROCEDURE — 99239 HOSP IP/OBS DSCHRG MGMT >30: CPT | Performed by: HOSPITALIST

## 2019-08-23 PROCEDURE — 85027 COMPLETE CBC AUTOMATED: CPT | Performed by: HOSPITALIST

## 2019-08-23 PROCEDURE — 82962 GLUCOSE BLOOD TEST: CPT

## 2019-08-23 PROCEDURE — 25010000002 PIPERACILLIN SOD-TAZOBACTAM PER 1 G: Performed by: NURSE PRACTITIONER

## 2019-08-23 RX ORDER — POTASSIUM CHLORIDE 750 MG/1
40 CAPSULE, EXTENDED RELEASE ORAL ONCE
Status: COMPLETED | OUTPATIENT
Start: 2019-08-23 | End: 2019-08-23

## 2019-08-23 RX ORDER — FAMOTIDINE 20 MG/1
20 TABLET, FILM COATED ORAL
Status: DISCONTINUED | OUTPATIENT
Start: 2019-08-23 | End: 2019-08-23 | Stop reason: HOSPADM

## 2019-08-23 RX ORDER — NYSTATIN 100000 U/G
CREAM TOPICAL 2 TIMES DAILY
Qty: 30 G | Refills: 0 | Status: ON HOLD | OUTPATIENT
Start: 2019-08-23 | End: 2019-11-14 | Stop reason: SDUPTHER

## 2019-08-23 RX ORDER — CEFDINIR 300 MG/1
300 CAPSULE ORAL 2 TIMES DAILY
Qty: 14 CAPSULE | Refills: 0 | Status: SHIPPED | OUTPATIENT
Start: 2019-08-23 | End: 2019-08-30

## 2019-08-23 RX ADMIN — TAZOBACTAM SODIUM AND PIPERACILLIN SODIUM 3.38 G: 375; 3 INJECTION, SOLUTION INTRAVENOUS at 04:40

## 2019-08-23 RX ADMIN — INSULIN DETEMIR 15 UNITS: 100 INJECTION, SOLUTION SUBCUTANEOUS at 10:18

## 2019-08-23 RX ADMIN — FAMOTIDINE 20 MG: 20 TABLET ORAL at 10:14

## 2019-08-23 RX ADMIN — INSULIN LISPRO 2 UNITS: 100 INJECTION, SOLUTION INTRAVENOUS; SUBCUTANEOUS at 10:15

## 2019-08-23 RX ADMIN — INSULIN LISPRO 5 UNITS: 100 INJECTION, SOLUTION INTRAVENOUS; SUBCUTANEOUS at 10:15

## 2019-08-23 RX ADMIN — POTASSIUM CHLORIDE 40 MEQ: 750 CAPSULE, EXTENDED RELEASE ORAL at 10:14

## 2019-08-23 RX ADMIN — Medication 250 MG: at 10:14

## 2019-08-23 RX ADMIN — AMLODIPINE BESYLATE 10 MG: 10 TABLET ORAL at 10:14

## 2019-08-23 RX ADMIN — SEVELAMER CARBONATE 0.8 G: 0.8 POWDER, FOR SUSPENSION ORAL at 10:12

## 2019-08-23 RX ADMIN — DULOXETINE HYDROCHLORIDE 30 MG: 30 CAPSULE, DELAYED RELEASE ORAL at 10:14

## 2019-08-23 RX ADMIN — MICAFUNGIN SODIUM 100 MG: 20 INJECTION, POWDER, LYOPHILIZED, FOR SOLUTION INTRAVENOUS at 09:50

## 2019-08-23 RX ADMIN — HYDRALAZINE HYDROCHLORIDE 100 MG: 50 TABLET, FILM COATED ORAL at 04:40

## 2019-08-23 RX ADMIN — TAZOBACTAM SODIUM AND PIPERACILLIN SODIUM 3.38 G: 375; 3 INJECTION, SOLUTION INTRAVENOUS at 09:02

## 2019-08-23 RX ADMIN — METOPROLOL TARTRATE 100 MG: 50 TABLET ORAL at 10:13

## 2019-08-23 NOTE — PROGRESS NOTES
[facility] Progress Note        Antibiotics:  Anti-Infectives (From admission, onward)    Ordered     Dose/Rate Route Frequency Start Stop    19 0918  cefdinir (OMNICEF) 300 MG capsule     Ordering Provider:  Jacqueline Porras MD    300 mg Oral 2 Times Daily 19 0000 19 2359    19 0735  micafungin 100 mg/100 mL 0.9% NS IVPB (mbp)     Ordering Provider:  Usman Dong MD    100 mg  over 60 Minutes Intravenous Every 24 Hours 19 0900 19 0859    19 1850  piperacillin-tazobactam (ZOSYN) 3.375 g in iso-osmotic dextrose 50 ml (premix)     Ordering Provider:  Mami Luz APRN    3.375 g  over 4 Hours Intravenous Every 8 Hours 19 0100 19 0058    19 1724  piperacillin-tazobactam (ZOSYN) 3.375 g in iso-osmotic dextrose 50 ml (premix)     Ordering Provider:  Foster Beebe MD    3.375 g Intravenous Once 19 1726 19 1827          CC: N/V    HPI:    Patient is a 48 y.o.  Yr old male with history of poorly contolled T2DM, DUNLAP/liver cirrhosis, HTN, PVD/Left BKA, and multiple skin abscesses/MRSA who presented to [facility] ED with right shin wound infection summer 2018.  He was unable to get to see Dr. Martinez as his father passed away unexpectedly on 18 and he had to wait until after the .  The wound worsened becoming gangrenous and he came to [facility] ED in 2018.  He also had been hospitalized at [facility] and then transferred to  for a severe penis/scrotum infection requiring surgical debridement in summer 2018.  He received antibiotics regarding his right leg infection, generally improved over the remainder of summer 2018 but that area had not completely healed. He was admitted 2019 with acute left lower abdominal wall redness/swelling and pain, spontaneous drainage associated with fever/chills and uncontrolled blood sugars.   I&D requiring wide debridement , severe/deep infection and transferred to [facility] on May 3 with IV Zosyn/oral doxycycline. Cultures had lactobacillus and mixed gram-positive skin sherly including alpha strep, staph epidermidis and corynebacterium. Readmitted to [facility] May 18, 2019, increasing generalized edema ultimately transitioned to oral doxycycline and healed.     He presented to the emergency room July 30, 2019 with acute rectal pain that had begun 7/27; this is associated with constipation for days, chills and nausea/dry heaving.  White blood cell count elevated, high hemoglobin A1c over 13, urinalysis with hematuria/pyuria and CT scan with 3 x 3 cm fluid collection anterior to the distal rectum and per discussion with Dr. Akbar/Jennifer, this is not in the prostate.  Colorectal surgery/urology saw him for consideration of surgical options/timing/threshold, etc..     8/2/19 I&Dper Dr Payne perirectal abscess; patient reports that he had instrumented his rectum prior to hospitalization with enema     8/3/19 urinary retention overnight requiring in/out catheterization per patient.  Creat climb     8/4/19 further creat climb; childs placed and lisinopril stopped and d/w Dr Rubio;  ?obstruction initially associated with retention postop (1200 out with I/O cath postop per nursing) -v- contrast from CT -v- lisinopril/medication/vancomycin -v- relative hypotension -v- likely combination of factors with ATN; nephrology following; vancomycin trough high and vancomycin stopped empirically 8/3 although high trough potentially accumulation as a consequence of diminishing renal function associated with retention/contrast/pressure rather than cause.  Nonetheless, avoid for now; creatinine trending down.         8/7 in retrospect he remembers air in his urine at admit which has raised concern for possible fistula from urinary tract;  No fecaluria and culture from abscess is fecal sherly;  ?rectal-urethral fistula;  Dr Payne aware     Culture with E. coli/Klebsiella species and creatinine  "generally trended down, transitioned to oral antibiotics at discharge.     Readmitted August 17, 2019 with nausea/vomiting/dry heaves for 3 days.  In addition he had diminishing urine output and difficulty emptying his bladder per his report.  No overt dysuria and denies seeing any blood or pyuria.  No flank pain.     Walker catheter was placed and CT scan of the abdomen showed:   \"Previously seen fluid collection identified inferior to the prostate gland is more increased in density on today's examination. There is wall thickening again seen throughout the bladder. Findings again are concerning for cystitis.\" per radiology    8/23/19 feels much better;  No hesitancy with urine, urinating well per him denies N/V and no diarrhea.  No fevers chills or sweats.  No rash.  Denies perirectal pain.  Improved  irritation in the groin associated with imjproving recent yeast infection     No headache photophobia or neck stiffness.  No shortness of breath cough or hemoptysis.  No diarrhea.  No hematochezia melena or hematemesis.  No skin rash.         ROS:      8/23/19 No f/c/s. No n/v/d.  No new ADR to Abx.       Constitutional-- No Fever, chills or sweats.  Appetite diminished with fatigue  Heent-- No new vision, hearing or throat complaints.  No epistaxis or oral sores.  Denies odynophagia or dysphagia.  No flashers, floaters or eye pain. No odynophagia or dysphagia. No headache, photophobia or neck stiffness.  CV-- No chest pain, palpitation or syncope  Resp-- No SOB/cough/Hemoptysis  GI- No  diarrhea.  No hematochezia, melena, or hematemesis. Denies jaundice or chronic liver disease.  -- No dysuria, hematuria, or flank pain.  Denies hesitancy, urgency or flank pain.  Lymph- no swollen lymph nodes in neck/axilla or groin.   Heme- No active bruising or bleeding; no Hx of DVT or PE.  MS-- no swelling or pain in the bones or joints of arms/legs.  No new back pain.  Neuro-- No acute focal weakness or numbness in the arms or " "legs.  No seizures.     Full 12 point review of systems reviewed and negative otherwise for acute complaints, except for above          PE:   /80 (BP Location: Left arm, Patient Position: Lying)   Pulse 78   Temp 98.2 °F (36.8 °C) (Oral)   Resp 18   Ht 190.5 cm (75\")   Wt 105 kg (230 lb 9.6 oz)   SpO2 94%   BMI 28.82 kg/m²       GENERAL: awake, in no acute distress.   HEENT: Normocephalic, atraumatic.  PERRL. EOMI. No conjunctival injection. No icterus. Oropharynx clear without evidence of thrush or exudate. No evidence of peridontal disease.    NECK: Supple without nuchal rigidity. No mass.  LYMPH: No cervical, axillary or inguinal lymphadenopathy.  HEART: RRR; No murmur, rubs, gallops.   LUNGS: Diminished at bases bilaterally without wheezing, rales, rhonchi. Normal respiratory effort. Nonlabored. No dullness.  ABDOMEN: Soft, nontender, nondistended. Positive bowel sounds. No rebound or guarding. NO mass or HSM.  EXT:  No cyanosis, clubbing or edema. No cord.  : Improved irritation/dermatitis consistent with improved cutaneous candidiasis.    MSK: FROM without joint effusions noted arms/legs.    SKIN: Warm and dry without cutaneous eruptions on Inspection/palpation.    NEURO: awake     Amputee status noted with left BKA     No peripheral stigmata/phenomena of endocarditis           Laboratory Data    Results from last 7 days   Lab Units 08/23/19  0357 08/22/19  0510 08/21/19  0430   WBC 10*3/mm3 11.11* 12.43* 12.61*   HEMOGLOBIN g/dL 11.4* 11.2* 12.1*   HEMATOCRIT % 35.1* 34.6* 37.5   PLATELETS 10*3/mm3 246 221 219     Results from last 7 days   Lab Units 08/23/19  0357   SODIUM mmol/L 140   POTASSIUM mmol/L 3.3*   CHLORIDE mmol/L 99   CO2 mmol/L 26.0   BUN mg/dL 21*   CREATININE mg/dL 1.59*   GLUCOSE mg/dL 162*   CALCIUM mg/dL 8.2*     Results from last 7 days   Lab Units 08/17/19  1625   ALK PHOS U/L 168*   BILIRUBIN mg/dL 0.4   ALT (SGPT) U/L 17   AST (SGOT) U/L 35         Results from last 7 " days   Lab Units 08/19/19  0531   CRP mg/dL 0.62*       Estimated Creatinine Clearance: 72.9 mL/min (A) (by C-G formula based on SCr of 1.59 mg/dL (H)).      Microbiology:      Radiology:  Imaging Results (last 72 hours)     Procedure Component Value Units Date/Time    CT Abdomen Pelvis Without Contrast [[id number]] Collected:  08/17/19 1848     Updated:  08/18/19 0947    Narrative:       EXAMINATION: CT ABDOMEN/PELVIS WO CONTRAST - 08/17/2019     INDICATION:  R53.1-Weakness; D72.829-Elevated white blood cell count,  unspecified; Z78.9-Other specified health status. Weakness, abdominal  pain.     TECHNIQUE: Multiple axial CT imaging is obtained of the abdomen and  pelvis without the administration of oral or intravenous contrast.     The radiation dose reduction device was turned on for each scan per the  ALARA (As Low as Reasonably Achievable) protocol.     COMPARISON: 07/30/2019     FINDINGS:      ABDOMEN: The lung bases are grossly clear. The liver is homogeneous in  appearance. No stones in the gallbladder. The spleen is unremarkable.  Pancreas is within normal limits. No abdominal retroperitoneal  lymphadenopathy. The kidneys and adrenal glands are stable with  bilateral myelolipomas present. No free fluid or free air. The appendix  is radiographically normal in appearance. No abnormal mass or fluid  collections identified. The abdominal portion of the gastrointestinal  tract is within normal limits.     PELVIS: The pelvic organs reveal abnormal wall thickening of the  bladder. There is incomplete distention. Cystitis cannot be excluded.  There is stool seen within the rectum. No definite abscess collection  identified on today's examination. The pelvic portion of the  gastrointestinal tract is within normal limits. No free fluid or free  air. No abnormal mass or fluid collections identified. Previously seen  fluid collection inferior to the prostate is more increased in density  on today's examination. No  definite abnormal fluid collection  identified.       Impression:       Previously seen fluid collection identified inferior to the  prostate gland is more increased in density on today's examination.  There is wall thickening again seen throughout the bladder. Findings  again are concerning for cystitis. Cystography can be performed for  further evaluation. Remainder of the CT abdomen and pelvis is stable.     DICTATED:   08/17/2019  EDITED/ls :   08/17/2019         This report was finalized on 8/18/2019 9:44 AM by Dr. Nella Enriquez MD.       XR Chest 1 View [[id number]] Collected:  08/17/19 1842     Updated:  08/18/19 0947    Narrative:          EXAMINATION: XR CHEST, SINGLE VIEW - 08/17/2019     INDICATION:  R53.1-Weakness; D72.829-Elevated white blood cell count,  unspecified; Z78.9-Other specified health status.      COMPARISON: None.     FINDINGS: Portable chest reveals cardiac and mediastinal silhouettes  within normal limits. Bony structures are unremarkable. No pleural  effusion or pneumothorax. No focal parenchymal opacification present.          Impression:       No acute cardiopulmonary disease.     DICTATED:   08/17/2019  EDITED/ls :   08/17/2019      This report was finalized on 8/18/2019 9:44 AM by Dr. Nella Enriquez MD.       CT Abdomen Pelvis Without Contrast [[id number]] Collected:  08/18/19 0331     Updated:  08/18/19 0333    Narrative:       CT Abdomen Pelvis WO    INDICATION:   Abdominal pain and elevated white count    TECHNIQUE:   CT of the abdomen and pelvis without contrast. Coronal and sagittal reconstructions were obtained.  Radiation dose reduction techniques included automated exposure control or exposure modulation based on body size. Radiation audit for number of CT and  nuclear cardiology exams performed in the last year: 6.      COMPARISON:   8/17/2019 at 6:32 PM    FINDINGS:  Included lung bases are clear. The heart size is the upper limits of normal.    Abdomen:  Noncontrasted imaging of the liver, gallbladder, spleen, pancreas and kidneys are within normal limits. Redemonstrated are bilateral adrenal nodules. The aorta is within normal limits. No threshold retroperitoneal adenopathy.    Pelvis: The bowel pattern is nonobstructive. No free air. No focal inflammatory change. Walker catheter demonstrated in the bladder. No threshold pelvic or inguinal adenopathy.    Regional osseous structures show no acute or aggressive bone lesions. Degenerative changes in the lower lumbar spine.      Impression:         1.  Interval placement of Walker catheter in bladder from prior study.  2.  Bilateral adrenal nodules which are stable from prior study.          Signer Name: Shane Watts MD   Signed: 8/18/2019 3:31 AM   Workstation Name: [id number]    [facility]            Impression:     --Acute nausea/vomiting/dry heaves over 3 days preceding admission and sought medical attention August 17 per family.  Likely acute dehydration.  Unclear at present if this relates to UTI (although initial urinary culture negative 8/17) versus other intra-abdominal process versus medication (I.e. Flagyl) or other.  Currently abdominal exam is benign, no further vomiting.  Rehydration/nutrition per medicine at their discretion    --Acute cutaneous candidiasis,  area;  urinary culture from August 17 negative and I suspect that the urine culture from August 18 represents cutaneous contamination particularly in light of epithelial cells present in that 8/18 sample; I am not convinced for urinary tract infection so far.  Mycamine initiated for cutaneous disease and transition to topical antifungal at discharge. IF progresive invasive candidias then consideration wouuld need to be given to longer duration of systemic antifungal     --Acute perirectal abscess ; Colorectal surgery, Dr. Payne following to help guide timing/options/threshold for further drainage if needed.  Drainage as  above on August 2 ;  Mixed Fecal sherly in culture; CT scans as above with no mention of abscess on 8/18 study;  Dr Payne has reviewed with radiology and most recent CT with complete evacuation of abscess     --History urinary retention.  Medicine following to help guide further work-up, urology input, etc. They will assess for adequacy of emptying and make further plans if necessary;  I d/w Dr Porras     --ARF postop;  ?obstruction initially associated with retention postop (1200 out with I/O cath postop per nursing) -v- contrast from CT -v- lisinopril/medication/vancomycin -v- relative hypotension -v- likely combination of factors with ATN;  vancomycin trough high and vancomycin stopped empirically 8/3 although high trough potentially accumulation as a consequence of diminishing renal function associated with retention/contrast/pressure rather than cause.  Nonetheless, avoid for now; likely further acute kidney injury at admission August 17 associated with dehydration/prerenal/ATN etiology;  Creat trended up some 8/22 and I d/w Dr Porras;  She is facilitating further neph f/u and hydration regimen, outpatient plan, etc..     --History MRSA;  Not in current culture     --Diabetes mellitus type 2, uncontrolled.  He reports the reason for this is forgetfulness     --History Johnston and chronic liver disease per past notes     --Left BKA     --Peripheral vascular disease        PLAN:     --IV zosyn/mycamine while here but likley taper to oral omnicef/topical antifungal at discharge; Dr Porras is helping forumlate plan for outpatient neph f/u and she is determining plan regarding urinary retention     --Check/review labs cultures and scans     --Discussed with microbiology     --History per nursing staff     --Discussed with Dr Porras as above     --Discussed with family, partial history per them     --Highly complex set of issues with high risk for further serious morbidity and other serious sequela     --Colorectal surgery  following with Dr Payne's input noted           Usman Dong MD  8/23/2019

## 2019-08-23 NOTE — PROGRESS NOTES
Case Management Discharge Note    Final Note: Spoke with patient and his wife in room.  His plan is still to return home with [facility] at discharge.  Discharge summary faxed and agency notified that patient would be dischargeing today.  Patient denies any other needs.  Annmarie Ruelas, RN [phone]    Destination      No service has been selected for the patient.      Durable Medical Equipment      No service has been selected for the patient.      Dialysis/Infusion      No service has been selected for the patient.      Home Medical Care      Service Provider Request Status Selected Services Address Phone Number Fax Number    [facility] [address] [id number] [phone]      Therapy      No service has been selected for the patient.      Community Resources      No service has been selected for the patient.             Final Discharge Disposition Code: 06 - home with home health care

## 2019-08-23 NOTE — PROGRESS NOTES
Kendrick Crystal     LOS: 6 days     Subjective     Patient resting comfortably.  No perineal pain.  Tolerating diet.  White blood cell count 11.  Passing urine.    Objective     Vital Signs  Vitals:    08/23/19 0718   BP: 147/80   Pulse: 78   Resp: 18   Temp: 98.2 °F (36.8 °C)   SpO2:      Physical Exam:  Abdomen soft and nontender.           Assessment/Plan       Intractable nausea and vomiting    DUNLAP Cirrhosis    Essential hypertension    S/P BKA (below knee amputation), left (CMS/HCC)    DM (diabetes mellitus) type II uncontrolled, periph vascular disorder (CMS/HCC)    Obesity (BMI 30-39.9)    Sepsis (CMS/HCC)    Perirectal abscess I&D 8/2/19    Dehydration    Gastroparesis    Leukocytosis    Urinary retention    Clinically and radiologically the patient's perirectal abscess is resolving.  Will sign off.  Please reconsult if patient develops recurrent perineal pain, fever, or other issues indicative of recurrent abscess.      Mumtaz Payne MD  08/23/19  8:21 AM

## 2019-08-23 NOTE — DISCHARGE SUMMARY
[facility]  DISCHARGE SUMMARY    Patient Name: Kendrick Crystal  : 1970  MRN: [id number]    Date of Admission: 2019  Date of Discharge:  19\  Primary Care Physician: Keyanna Leone APRN    Consults     Date and Time Order Name Status Description    2019 1851 Inpatient Infectious Diseases Consult Completed     2019 1850 Inpatient Colorectal Surgery Consult Completed     2019 1003 Inpatient Nephrology Consult Completed     2019 0955 Inpatient Urology Consult Completed     2019 0844 Inpatient Infectious Diseases Consult Completed     2019 0030 Inpatient Colorectal Surgery Consult Completed           Hospital Course     Presenting Problem:   Leukocytosis [D72.829]    Active Hospital Problems    Diagnosis  POA   • Gastroparesis [K31.84]  Yes   • Obesity (BMI 30-39.9) [E66.9]  Yes   • DM (diabetes mellitus) type II uncontrolled, periph vascular disorder (CMS/HCC) [E11.51, E11.65]  Yes   • S/P BKA (below knee amputation), left (CMS/[facility]) [Z89.512]  Not Applicable   • Essential hypertension [I10]  Yes   • DUNLAP Cirrhosis [K74.60]  Yes      Resolved Hospital Problems    Diagnosis Date Resolved POA   • **Intractable nausea and vomiting [R11.2] 2019 Yes   • Urinary retention [R33.9] 2019 Yes   • Lactic acidosis [E87.2] 2019 Yes   • Perirectal abscess I&D 19 [K61.1] 2019 Yes   • Dehydration [E86.0] 2019 Yes   • Leukocytosis [D72.829] 2019 Yes   • Sepsis (CMS/HCC) [A41.9] 2019 Yes          Hospital Course:  Kendrick Crystal is a 48 y.o. 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 Payne. Cultures returned positive for E. coli and Klebsiella and he was treated with IV antibiotics per Dr. Dong. He was discharged on 2019 with oral Ceftin  and Flagyl. He then developed extreme nausea and vomiting and has had difficulty keeping his antibiotics down. Workup in the ER revealed worsening leukocytosis and lactic acidosis. UA consistent with infection. CT A/P showed increase in fluid collection density concerning for recurrent abscess.    Dr. Sunshine and Dr. Payne both followed the patient. He had a repeat CT scan which showed no evidence of remaining drainable abscess. No intervention performed.     Dr. Dong followed along. His urine culture grew yeast. Blood cultures remained negative. He was treated with Zosyn and Micafungin. Will switch to Omnicef x 1 week along with topical antifungal for cutaneous candidiasis in the groin region. He will follow up in ID clinic in 1 week.    His renal function has continued to improve since his previous admission. He will see [facility] in 1-2 weeks with repeat labs to continue close monitoring. He had some urinary retention this admission but Walker was removed on 8/22 and he has urinated on his on without difficulty. Continue on Flomax.    Of note, patient has been noncompliant and refusing aspects of his care. He refused to work with PT/OT at times, only wanted to use the lift to use the bathroom. Ultimately he has been able to show that he is able to transfer from bed to his scooter chair on his own. He has a loose prosthesis on his left leg, has someone who has drafted a new one for him but patient has been unable to make it to their office to get fitted and pick it up, as he has been in the hospital recently. He and wife have been instructed to call their office today to make an appointment to go see them and be fitted properly.     Discharge Follow Up Recommendations for labs/diagnostics:  F/U with PCP in 1 week  F/U with ID/Dr. Dong in 1 week  F/U with [facility] in 1-2 weeks with repeat BMP    Day of Discharge     HPI:   Patient seen this morning. No complaints. Wife at bedside. Eager  to go home.    Review of Systems  Gen-no fevers, no chills  CV-no chest pain, no palpitations  Resp-no cough, no dyspnea  GI-no N/V/D, no abd pain    Otherwise ROS is negative except as mentioned in the HPI.    Vital Signs:   Temp:  [98.1 °F (36.7 °C)-98.6 °F (37 °C)] 98.2 °F (36.8 °C)  Heart Rate:  [74-78] 78  Resp:  [18-20] 18  BP: (147-165)/(80-93) 147/80     Physical Exam:  Gen-no acute distress  CV-RRR, S1 S2 normal, no m/r/g  Resp-CTAB, no wheezes or rales  Abd-soft, NT, ND, +BS  Ext-no edema; left BKA present  Neuro-A&Ox3, no focal deficits  Skin-no rashes  Psych-appropriate mood      Pertinent  and/or Most Recent Results     Results from last 7 days   Lab Units 08/23/19  0357 08/22/19  0510 08/21/19  0430 08/20/19  1017 08/20/19  0943 08/19/19  0531 08/17/19  1625   WBC 10*3/mm3 11.11* 12.43* 12.61*  --  12.85* 12.76* 16.28*   HEMOGLOBIN g/dL 11.4* 11.2* 12.1*  --  12.2* 11.6* 14.0   HEMATOCRIT % 35.1* 34.6* 37.5  --  36.6* 36.4* 42.9   PLATELETS 10*3/mm3 246 221 219  --  213 227 305   SODIUM mmol/L 140 138 142 143  --  134* 137   POTASSIUM mmol/L 3.3* 3.6 3.3* 3.2*  --  3.5 4.5   CHLORIDE mmol/L 99 97* 102 102  --  95* 90*   CO2 mmol/L 26.0 29.0 28.0 29.0  --  23.0 29.0   BUN mg/dL 21* 21* 23* 26*  --  35* 35*   CREATININE mg/dL 1.59* 1.92* 1.59* 1.60*  --  1.93* 1.91*   GLUCOSE mg/dL 162* 264* 103* 183*  --  278* 296*   CALCIUM mg/dL 8.2* 7.9* 8.4* 8.6  --  7.6* 9.0     Results from last 7 days   Lab Units 08/17/19  1625   BILIRUBIN mg/dL 0.4   ALK PHOS U/L 168*   ALT (SGPT) U/L 17   AST (SGOT) U/L 35           Invalid input(s): TG, LDLCALC, LDLREALC  Results from last 7 days   Lab Units 08/17/19 2032 08/17/19  1625   LACTATE mmol/L 1.6 3.7*       Brief Urine Lab Results  (Last result in the past 365 days)      Color   Clarity   Blood   Leuk Est   Nitrite   Protein   CREAT   Urine HCG        08/18/19 0407 Yellow Turbid Small (1+) Large (3+) Negative >=300 mg/dL (3+)               Microbiology Results  Abnormal     Procedure Component Value - Date/Time    Blood Culture - Blood, Arm, Left [[id number]] Collected:  08/17/19 1735    Lab Status:  Final result Specimen:  Blood from Arm, Left Updated:  08/22/19 1800     Blood Culture No growth at 5 days    Blood Culture - Blood, Hand, Left [[id number]] Collected:  08/17/19 1745    Lab Status:  Final result Specimen:  Blood from Hand, Left Updated:  08/22/19 1800     Blood Culture No growth at 5 days    Urine Culture - Urine, Urine, Clean Catch [[id number]]  (Abnormal) Collected:  08/18/19 0407    Lab Status:  Final result Specimen:  Urine, Clean Catch Updated:  08/19/19 1401     Urine Culture >100,000 CFU/mL Yeast, Not Candida albicans    Urine Culture - Urine, Urine, Catheter [[id number]]  (Normal) Collected:  08/17/19 1729    Lab Status:  Final result Specimen:  Urine, Catheter Updated:  08/19/19 0932     Urine Culture No growth          Imaging Results (all)     Procedure Component Value Units Date/Time    CT Abdomen Pelvis Without Contrast [[id number]] Collected:  08/17/19 1848     Updated:  08/18/19 0947    Narrative:       EXAMINATION: CT ABDOMEN/PELVIS WO CONTRAST - 08/17/2019     INDICATION:  R53.1-Weakness; D72.829-Elevated white blood cell count,  unspecified; Z78.9-Other specified health status. Weakness, abdominal  pain.     TECHNIQUE: Multiple axial CT imaging is obtained of the abdomen and  pelvis without the administration of oral or intravenous contrast.     The radiation dose reduction device was turned on for each scan per the  ALARA (As Low as Reasonably Achievable) protocol.     COMPARISON: 07/30/2019     FINDINGS:      ABDOMEN: The lung bases are grossly clear. The liver is homogeneous in  appearance. No stones in the gallbladder. The spleen is unremarkable.  Pancreas is within normal limits. No abdominal retroperitoneal  lymphadenopathy. The kidneys and adrenal glands are stable with  bilateral myelolipomas present. No free fluid or free air. The  appendix  is radiographically normal in appearance. No abnormal mass or fluid  collections identified. The abdominal portion of the gastrointestinal  tract is within normal limits.     PELVIS: The pelvic organs reveal abnormal wall thickening of the  bladder. There is incomplete distention. Cystitis cannot be excluded.  There is stool seen within the rectum. No definite abscess collection  identified on today's examination. The pelvic portion of the  gastrointestinal tract is within normal limits. No free fluid or free  air. No abnormal mass or fluid collections identified. Previously seen  fluid collection inferior to the prostate is more increased in density  on today's examination. No definite abnormal fluid collection  identified.       Impression:       Previously seen fluid collection identified inferior to the  prostate gland is more increased in density on today's examination.  There is wall thickening again seen throughout the bladder. Findings  again are concerning for cystitis. Cystography can be performed for  further evaluation. Remainder of the CT abdomen and pelvis is stable.     DICTATED:   08/17/2019  EDITED/ls :   08/17/2019         This report was finalized on 8/18/2019 9:44 AM by Dr. Nella Enriquez MD.       XR Chest 1 View [[id number]] Collected:  08/17/19 1842     Updated:  08/18/19 0947    Narrative:          EXAMINATION: XR CHEST, SINGLE VIEW - 08/17/2019     INDICATION:  R53.1-Weakness; D72.829-Elevated white blood cell count,  unspecified; Z78.9-Other specified health status.      COMPARISON: None.     FINDINGS: Portable chest reveals cardiac and mediastinal silhouettes  within normal limits. Bony structures are unremarkable. No pleural  effusion or pneumothorax. No focal parenchymal opacification present.          Impression:       No acute cardiopulmonary disease.     DICTATED:   08/17/2019  EDITED/ls :   08/17/2019      This report was finalized on 8/18/2019 9:44 AM by Dr. Carmen  MD Mina.       CT Abdomen Pelvis Without Contrast [[id number]] Collected:  08/18/19 0331     Updated:  08/18/19 0333    Narrative:       CT Abdomen Pelvis WO    INDICATION:   Abdominal pain and elevated white count    TECHNIQUE:   CT of the abdomen and pelvis without contrast. Coronal and sagittal reconstructions were obtained.  Radiation dose reduction techniques included automated exposure control or exposure modulation based on body size. Radiation audit for number of CT and  nuclear cardiology exams performed in the last year: 6.      COMPARISON:   8/17/2019 at 6:32 PM    FINDINGS:  Included lung bases are clear. The heart size is the upper limits of normal.    Abdomen: Noncontrasted imaging of the liver, gallbladder, spleen, pancreas and kidneys are within normal limits. Redemonstrated are bilateral adrenal nodules. The aorta is within normal limits. No threshold retroperitoneal adenopathy.    Pelvis: The bowel pattern is nonobstructive. No free air. No focal inflammatory change. Walker catheter demonstrated in the bladder. No threshold pelvic or inguinal adenopathy.    Regional osseous structures show no acute or aggressive bone lesions. Degenerative changes in the lower lumbar spine.      Impression:         1.  Interval placement of Walker catheter in bladder from prior study.  2.  Bilateral adrenal nodules which are stable from prior study.          Signer Name: Shane Watts MD   Signed: 8/18/2019 3:31 AM   Workstation Name: [email or web address]    [facility]            Discharge Details        Discharge Medications      New Medications      Instructions Start Date   cefdinir 300 MG capsule  Commonly known as:  OMNICEF   300 mg, Oral, 2 Times Daily      nystatin [id number] UNIT/GM cream  Commonly known as:  MYCOSTATIN   Topical, 2 Times Daily         Continue These Medications      Instructions Start Date   amLODIPine 10 MG tablet  Commonly known as:  NORVASC   10 mg, Oral, Every 24 Hours  Scheduled      DULoxetine 30 MG capsule  Commonly known as:  CYMBALTA   30 mg, Oral, Daily      FLORASTOR 250 MG capsule  Generic drug:  saccharomyces boulardii   250 mg, Oral, 2 Times Daily      hydrALAZINE 100 MG tablet  Commonly known as:  APRESOLINE   100 mg, Oral, Every 8 Hours Scheduled      HYDROcodone-acetaminophen  MG per tablet  Commonly known as:  NORCO   1 tablet, Oral, 3 Times Daily PRN      insulin lispro 100 UNIT/ML injection  Commonly known as:  humaLOG   5 Units, Subcutaneous, 4 Times Daily With Meals & Nightly, Plus sliding scale      LANTUS 100 UNIT/ML injection  Generic drug:  insulin glargine   30 Units, Subcutaneous, 2 Times Daily      metoprolol tartrate 100 MG tablet  Commonly known as:  LOPRESSOR   100 mg, Oral, Every 12 Hours      promethazine 12.5 MG tablet  Commonly known as:  PHENERGAN   12.5 mg, Oral, Every 6 Hours PRN      sevelamer 0.8 g pack packet  Commonly known as:  RENVELA   800 mg, Oral, 3 Times Daily With Meals      tamsulosin 0.4 MG capsule 24 hr capsule  Commonly known as:  FLOMAX   0.8 mg, Oral, Nightly      terazosin 5 MG capsule  Commonly known as:  HYTRIN   5 mg, Oral, Nightly         Stop These Medications    cefuroxime 500 MG tablet  Commonly known as:  CEFTIN     erythromycin base 250 MG tablet  Commonly known as:  E-MYCIN     metroNIDAZOLE 500 MG tablet  Commonly known as:  FLAGYL            No Known Allergies      Discharge Disposition:  Home or Self Care    Discharge Diet:  Diet Order   Procedures   • Diet Regular; Cardiac, Consistent Carbohydrate         Discharge Activity:   Activity Instructions     Activity as Tolerated      Up WIth Assist              CODE STATUS:    Code Status and Medical Interventions:   Ordered at: 08/17/19 1814     Level Of Support Discussed With:    Patient     Code Status:    CPR     Medical Interventions (Level of Support Prior to Arrest):    Full         Future Appointments   Date Time Provider Department Center   10/1/2019 11:15  AM Keyanna Leone APRN [facility] None       Additional Instructions for the Follow-ups that You Need to Schedule     Ambulatory Referral to Home Health   As directed      Face to Face Visit Date:  8/22/2019    Follow-up Provider for Plan of Care?:  I treated the patient in an acute care facility and will not continue treatment after discharge.    Follow-up Provider:  GILLIAN VAZQUEZ [[id number]]    Reason/Clinical Findings:  Acute cutaneous candidiasis    Describe mobility limitations that make leaving home difficult:  Impaired functional mobility, gait, balance, endurance    Nursing/Therapeutic Services Requested:  Skilled Nursing Physical Therapy Occupational Therapy Medical / Social Work    Skilled nursing orders:  Medication education Cardiopulmonary assessments    PT orders:  Therapeutic exercise ([facility]) Gait Training Strengthening    Weight Bearing Status:  As Tolerated    Frequency:  1 Week 1         Discharge Follow-up with PCP   As directed       Currently Documented PCP:    Keyanna Leone APRN    PCP Phone Number:    [phone]     Follow Up Details:  1 week         Discharge Follow-up with Specified Provider: [facility] in 1 week   As directed      To:  [facility] in 1 week         Discharge Follow-up with Specified Provider: [facility] in 1-2 weeks with repeat BMP   As directed      To:  [facility] in 1-2 weeks with repeat BMP               Time Spent on Discharge:  35 minutes    Electronically signed by Jacqueline Porras MD, 08/23/19, 9:27 AM

## 2019-08-23 NOTE — DISCHARGE PLACEMENT REQUEST
"Elliot Crystal (48 y.o. Male)     Annmarie Ruelas, RN  [phone]    Date of Birth Social Security Number Address Home Phone MRN    1970  [address] [place] [address] [address] [place] [address] [phone] [id number]    Jewish Marital Status          Congregation        Admission Date Admission Type Admitting Provider Attending Provider Department, Room/Bed    8/17/19 Emergency Jacqueline Porras MD Reddy, Mayuri V, MD [address], S[address]/1    Discharge Date Discharge Disposition Discharge Destination         Home or Self Care              Attending Provider:  Jacqueline Porras MD    Allergies:  No Known Allergies    Isolation:  None   Infection:  MRSA (03/03/17)   Code Status:  CPR    Ht:  190.5 cm (75\")   Wt:  105 kg (230 lb 9.6 oz)    Admission Cmt:  None   Principal Problem:  Intractable nausea and vomiting [R11.2]                 Active Insurance as of 8/17/2019     Primary Coverage     Payor Plan Insurance Group Employer/Plan Group    MEDICARE MEDICARE A & B      Payor Plan Address Payor Plan Phone Number Payor Plan Fax Number Effective Dates    [address] [phone]  6/1/2017 - None Entered    [facility] [id number]       Subscriber Name Subscriber Birth Date Member ID       ELLIOT CRYSTAL 1970 [id number]           Secondary Coverage     Payor Plan Insurance Group Employer/Plan Group    [place]      Payor Plan Address Payor Plan Phone Number Payor Plan Fax Number Effective Dates    [address] [phone]  10/3/2017 - None Entered    [place] [address]       Subscriber Name Subscriber Birth Date Member ID       ELLIOT CRYSTAL 1970 [id number]                 Emergency Contacts      (Rel.) Home Phone Work Phone Mobile Phone    Cindy Crystal (Spouse) [phone] -- [phone]               Discharge Summary      Jacqueline Porras MD at 8/23/2019  9:18 AM              [facility] Medicine Services  DISCHARGE " SUMMARY    Patient Name: Kendrick Crystal  : 1970  MRN: [id number]    Date of Admission: 2019  Date of Discharge:  19\  Primary Care Physician: Keyanna Leone APRN    Consults     Date and Time Order Name Status Description    2019 1851 Inpatient Infectious Diseases Consult Completed     2019 1850 Inpatient Colorectal Surgery Consult Completed     2019 1003 Inpatient Nephrology Consult Completed     2019 0955 Inpatient Urology Consult Completed     2019 0844 Inpatient Infectious Diseases Consult Completed     2019 0030 Inpatient Colorectal Surgery Consult Completed           Hospital Course     Presenting Problem:   Leukocytosis [D72.829]    Active Hospital Problems    Diagnosis  POA   • Gastroparesis [K31.84]  Yes   • Obesity (BMI 30-39.9) [E66.9]  Yes   • DM (diabetes mellitus) type II uncontrolled, periph vascular disorder (CMS/HCC) [E11.51, E11.65]  Yes   • S/P BKA (below knee amputation), left (CMS/[facility]) [Z89.512]  Not Applicable   • Essential hypertension [I10]  Yes   • DUNLAP Cirrhosis [K74.60]  Yes      Resolved Hospital Problems    Diagnosis Date Resolved POA   • **Intractable nausea and vomiting [R11.2] 2019 Yes   • Urinary retention [R33.9] 2019 Yes   • Lactic acidosis [E87.2] 2019 Yes   • Perirectal abscess I&D 19 [K61.1] 2019 Yes   • Dehydration [E86.0] 2019 Yes   • Leukocytosis [D72.829] 2019 Yes   • Sepsis (CMS/HCC) [A41.9] 2019 Yes          Hospital Course:  Kendrick Crystal is a 48 y.o. 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 Payne. Cultures returned positive for E. coli and Klebsiella and he was treated with IV antibiotics per Dr. Dong. He was discharged on 2019 with oral Ceftin and Flagyl. He then developed extreme nausea and vomiting and has  had difficulty keeping his antibiotics down. Workup in the ER revealed worsening leukocytosis and lactic acidosis. UA consistent with infection. CT A/P showed increase in fluid collection density concerning for recurrent abscess.    Dr. Sunshine and Dr. Payne both followed the patient. He had a repeat CT scan which showed no evidence of remaining drainable abscess. No intervention performed.     Dr. Dong followed along. His urine culture grew yeast. Blood cultures remained negative. He was treated with Zosyn and Micafungin. Will switch to Omnicef x 1 week along with topical antifungal for cutaneous candidiasis in the groin region. He will follow up in ID clinic in 1 week.    His renal function has continued to improve since his previous admission. He will see [facility] in 1-2 weeks with repeat labs to continue close monitoring. He had some urinary retention this admission but Walker was removed on 8/22 and he has urinated on his on without difficulty. Continue on Flomax.    Of note, patient has been noncompliant and refusing aspects of his care. He refused to work with PT/OT at times, only wanted to use the lift to use the bathroom. Ultimately he has been able to show that he is able to transfer from bed to his scooter chair on his own. He has a loose prosthesis on his left leg, has someone who has drafted a new one for him but patient has been unable to make it to their office to get fitted and pick it up, as he has been in the hospital recently. He and wife have been instructed to call their office today to make an appointment to go see them and be fitted properly.     Discharge Follow Up Recommendations for labs/diagnostics:  F/U with PCP in 1 week  F/U with ID/Dr. Dong in 1 week  F/U with [facility] in 1-2 weeks with repeat BMP    Day of Discharge     HPI:   Patient seen this morning. No complaints. Wife at bedside. Eager to go home.    Review of Systems  Gen-no fevers, no chills  CV-no  chest pain, no palpitations  Resp-no cough, no dyspnea  GI-no N/V/D, no abd pain    Otherwise ROS is negative except as mentioned in the HPI.    Vital Signs:   Temp:  [98.1 °F (36.7 °C)-98.6 °F (37 °C)] 98.2 °F (36.8 °C)  Heart Rate:  [74-78] 78  Resp:  [18-20] 18  BP: (147-165)/(80-93) 147/80     Physical Exam:  Gen-no acute distress  CV-RRR, S1 S2 normal, no m/r/g  Resp-CTAB, no wheezes or rales  Abd-soft, NT, ND, +BS  Ext-no edema; left BKA present  Neuro-A&Ox3, no focal deficits  Skin-no rashes  Psych-appropriate mood      Pertinent  and/or Most Recent Results     Results from last 7 days   Lab Units 08/23/19  0357 08/22/19  0510 08/21/19  0430 08/20/19  1017 08/20/19  0943 08/19/19  0531 08/17/19  1625   WBC 10*3/mm3 11.11* 12.43* 12.61*  --  12.85* 12.76* 16.28*   HEMOGLOBIN g/dL 11.4* 11.2* 12.1*  --  12.2* 11.6* 14.0   HEMATOCRIT % 35.1* 34.6* 37.5  --  36.6* 36.4* 42.9   PLATELETS 10*3/mm3 246 221 219  --  213 227 305   SODIUM mmol/L 140 138 142 143  --  134* 137   POTASSIUM mmol/L 3.3* 3.6 3.3* 3.2*  --  3.5 4.5   CHLORIDE mmol/L 99 97* 102 102  --  95* 90*   CO2 mmol/L 26.0 29.0 28.0 29.0  --  23.0 29.0   BUN mg/dL 21* 21* 23* 26*  --  35* 35*   CREATININE mg/dL 1.59* 1.92* 1.59* 1.60*  --  1.93* 1.91*   GLUCOSE mg/dL 162* 264* 103* 183*  --  278* 296*   CALCIUM mg/dL 8.2* 7.9* 8.4* 8.6  --  7.6* 9.0     Results from last 7 days   Lab Units 08/17/19  1625   BILIRUBIN mg/dL 0.4   ALK PHOS U/L 168*   ALT (SGPT) U/L 17   AST (SGOT) U/L 35           Invalid input(s): TG, LDLCALC, LDLREALC  Results from last 7 days   Lab Units 08/17/19 2032 08/17/19  1625   LACTATE mmol/L 1.6 3.7*       Brief Urine Lab Results  (Last result in the past 365 days)      Color   Clarity   Blood   Leuk Est   Nitrite   Protein   CREAT   Urine HCG        08/18/19 0407 Yellow Turbid Small (1+) Large (3+) Negative >=300 mg/dL (3+)               Microbiology Results Abnormal     Procedure Component Value - Date/Time    Blood Culture -  Blood, Arm, Left [[id number]] Collected:  08/17/19 1735    Lab Status:  Final result Specimen:  Blood from Arm, Left Updated:  08/22/19 1800     Blood Culture No growth at 5 days    Blood Culture - Blood, Hand, Left [[id number]] Collected:  08/17/19 1745    Lab Status:  Final result Specimen:  Blood from Hand, Left Updated:  08/22/19 1800     Blood Culture No growth at 5 days    Urine Culture - Urine, Urine, Clean Catch [[id number]]  (Abnormal) Collected:  08/18/19 0407    Lab Status:  Final result Specimen:  Urine, Clean Catch Updated:  08/19/19 1401     Urine Culture >100,000 CFU/mL Yeast, Not Candida albicans    Urine Culture - Urine, Urine, Catheter [[id number]]  (Normal) Collected:  08/17/19 1729    Lab Status:  Final result Specimen:  Urine, Catheter Updated:  08/19/19 0932     Urine Culture No growth          Imaging Results (all)     Procedure Component Value Units Date/Time    CT Abdomen Pelvis Without Contrast [[id number]] Collected:  08/17/19 1848     Updated:  08/18/19 0947    Narrative:       EXAMINATION: CT ABDOMEN/PELVIS WO CONTRAST - 08/17/2019     INDICATION:  R53.1-Weakness; D72.829-Elevated white blood cell count,  unspecified; Z78.9-Other specified health status. Weakness, abdominal  pain.     TECHNIQUE: Multiple axial CT imaging is obtained of the abdomen and  pelvis without the administration of oral or intravenous contrast.     The radiation dose reduction device was turned on for each scan per the  ALARA (As Low as Reasonably Achievable) protocol.     COMPARISON: 07/30/2019     FINDINGS:      ABDOMEN: The lung bases are grossly clear. The liver is homogeneous in  appearance. No stones in the gallbladder. The spleen is unremarkable.  Pancreas is within normal limits. No abdominal retroperitoneal  lymphadenopathy. The kidneys and adrenal glands are stable with  bilateral myelolipomas present. No free fluid or free air. The appendix  is radiographically normal in appearance. No abnormal mass or  fluid  collections identified. The abdominal portion of the gastrointestinal  tract is within normal limits.     PELVIS: The pelvic organs reveal abnormal wall thickening of the  bladder. There is incomplete distention. Cystitis cannot be excluded.  There is stool seen within the rectum. No definite abscess collection  identified on today's examination. The pelvic portion of the  gastrointestinal tract is within normal limits. No free fluid or free  air. No abnormal mass or fluid collections identified. Previously seen  fluid collection inferior to the prostate is more increased in density  on today's examination. No definite abnormal fluid collection  identified.       Impression:       Previously seen fluid collection identified inferior to the  prostate gland is more increased in density on today's examination.  There is wall thickening again seen throughout the bladder. Findings  again are concerning for cystitis. Cystography can be performed for  further evaluation. Remainder of the CT abdomen and pelvis is stable.     DICTATED:   08/17/2019  EDITED/ls :   08/17/2019         This report was finalized on 8/18/2019 9:44 AM by Dr. Nella Enriquez MD.       XR Chest 1 View [[id number]] Collected:  08/17/19 1842     Updated:  08/18/19 0947    Narrative:          EXAMINATION: XR CHEST, SINGLE VIEW - 08/17/2019     INDICATION:  R53.1-Weakness; D72.829-Elevated white blood cell count,  unspecified; Z78.9-Other specified health status.      COMPARISON: None.     FINDINGS: Portable chest reveals cardiac and mediastinal silhouettes  within normal limits. Bony structures are unremarkable. No pleural  effusion or pneumothorax. No focal parenchymal opacification present.          Impression:       No acute cardiopulmonary disease.     DICTATED:   08/17/2019  EDITED/ls :   08/17/2019      This report was finalized on 8/18/2019 9:44 AM by Dr. Nella Enriquez MD.       CT Abdomen Pelvis Without Contrast [[id number]]  Collected:  08/18/19 0331     Updated:  08/18/19 0333    Narrative:       CT Abdomen Pelvis WO    INDICATION:   Abdominal pain and elevated white count    TECHNIQUE:   CT of the abdomen and pelvis without contrast. Coronal and sagittal reconstructions were obtained.  Radiation dose reduction techniques included automated exposure control or exposure modulation based on body size. Radiation audit for number of CT and  nuclear cardiology exams performed in the last year: 6.      COMPARISON:   8/17/2019 at 6:32 PM    FINDINGS:  Included lung bases are clear. The heart size is the upper limits of normal.    Abdomen: Noncontrasted imaging of the liver, gallbladder, spleen, pancreas and kidneys are within normal limits. Redemonstrated are bilateral adrenal nodules. The aorta is within normal limits. No threshold retroperitoneal adenopathy.    Pelvis: The bowel pattern is nonobstructive. No free air. No focal inflammatory change. Walker catheter demonstrated in the bladder. No threshold pelvic or inguinal adenopathy.    Regional osseous structures show no acute or aggressive bone lesions. Degenerative changes in the lower lumbar spine.      Impression:         1.  Interval placement of Walker catheter in bladder from prior study.  2.  Bilateral adrenal nodules which are stable from prior study.          Signer Name: Shane Watts MD   Signed: 8/18/2019 3:31 AM   Workstation Name: [id number]    [facility]            Discharge Details        Discharge Medications      New Medications      Instructions Start Date   cefdinir 300 MG capsule  Commonly known as:  OMNICEF   300 mg, Oral, 2 Times Daily      nystatin 283928 UNIT/GM cream  Commonly known as:  MYCOSTATIN   Topical, 2 Times Daily         Continue These Medications      Instructions Start Date   amLODIPine 10 MG tablet  Commonly known as:  NORVASC   10 mg, Oral, Every 24 Hours Scheduled      DULoxetine 30 MG capsule  Commonly known as:   CYMBALTA   30 mg, Oral, Daily      FLORASTOR 250 MG capsule  Generic drug:  saccharomyces boulardii   250 mg, Oral, 2 Times Daily      hydrALAZINE 100 MG tablet  Commonly known as:  APRESOLINE   100 mg, Oral, Every 8 Hours Scheduled      HYDROcodone-acetaminophen  MG per tablet  Commonly known as:  NORCO   1 tablet, Oral, 3 Times Daily PRN      insulin lispro 100 UNIT/ML injection  Commonly known as:  humaLOG   5 Units, Subcutaneous, 4 Times Daily With Meals & Nightly, Plus sliding scale      LANTUS 100 UNIT/ML injection  Generic drug:  insulin glargine   30 Units, Subcutaneous, 2 Times Daily      metoprolol tartrate 100 MG tablet  Commonly known as:  LOPRESSOR   100 mg, Oral, Every 12 Hours      promethazine 12.5 MG tablet  Commonly known as:  PHENERGAN   12.5 mg, Oral, Every 6 Hours PRN      sevelamer 0.8 g pack packet  Commonly known as:  RENVELA   800 mg, Oral, 3 Times Daily With Meals      tamsulosin 0.4 MG capsule 24 hr capsule  Commonly known as:  FLOMAX   0.8 mg, Oral, Nightly      terazosin 5 MG capsule  Commonly known as:  HYTRIN   5 mg, Oral, Nightly         Stop These Medications    cefuroxime 500 MG tablet  Commonly known as:  CEFTIN     erythromycin base 250 MG tablet  Commonly known as:  E-MYCIN     metroNIDAZOLE 500 MG tablet  Commonly known as:  FLAGYL            No Known Allergies      Discharge Disposition:  Home or Self Care    Discharge Diet:  Diet Order   Procedures   • Diet Regular; Cardiac, Consistent Carbohydrate         Discharge Activity:   Activity Instructions     Activity as Tolerated      Up WIth Assist              CODE STATUS:    Code Status and Medical Interventions:   Ordered at: 08/17/19 0886     Level Of Support Discussed With:    Patient     Code Status:    CPR     Medical Interventions (Level of Support Prior to Arrest):    Full         Future Appointments   Date Time Provider Department Center   10/1/2019 11:15 AM Keyanna Leone APRN [facility] None       Additional  Instructions for the Follow-ups that You Need to Schedule     Ambulatory Referral to Home Health   As directed      Face to Face Visit Date:  8/22/2019    Follow-up Provider for Plan of Care?:  I treated the patient in an acute care facility and will not continue treatment after discharge.    Follow-up Provider:  GILLIAN VAZQUEZ [[id number]]    Reason/Clinical Findings:  Acute cutaneous candidiasis    Describe mobility limitations that make leaving home difficult:  Impaired functional mobility, gait, balance, endurance    Nursing/Therapeutic Services Requested:  Skilled Nursing Physical Therapy Occupational Therapy Medical / Social Work    Skilled nursing orders:  Medication education Cardiopulmonary assessments    PT orders:  Therapeutic exercise (LBKA) Gait Training Strengthening    Weight Bearing Status:  As Tolerated    Frequency:  1 Week 1         Discharge Follow-up with PCP   As directed       Currently Documented PCP:    Keyanna Leone APRN    PCP Phone Number:    [phone]     Follow Up Details:  1 week         Discharge Follow-up with Specified Provider: [facility] in 1 week   As directed      To:  [facility] in 1 week         Discharge Follow-up with Specified Provider: [facility] in 1-2 weeks with repeat BMP   As directed      To:  [facility] in 1-2 weeks with repeat BMP               Time Spent on Discharge:  35 minutes    Electronically signed by Jacqueline Porras MD, 08/23/19, 9:27 AM          Electronically signed by Jacqueline Porras MD at 8/23/2019  9:27 AM

## 2019-08-24 ENCOUNTER — READMISSION MANAGEMENT (OUTPATIENT)
Dept: CALL CENTER | Facility: HOSPITAL | Age: 49
End: 2019-08-24

## 2019-08-24 NOTE — OUTREACH NOTE
Prep Survey      Responses   Facility patient discharged from?  [place]   Is patient eligible?  Yes   Discharge diagnosis  Intractable nausea and vomiting    Does the patient have one of the following disease processes/diagnoses(primary or secondary)?  Other   Does the patient have Home health ordered?  Yes   What is the Home health agency?   [facility]    Is there a DME ordered?  No   Prep survey completed?  Yes          Fariba Nash RN

## 2019-08-25 ENCOUNTER — READMISSION MANAGEMENT (OUTPATIENT)
Dept: CALL CENTER | Facility: HOSPITAL | Age: 49
End: 2019-08-25

## 2019-08-25 NOTE — OUTREACH NOTE
Medical Week 1 Survey      Responses   Facility patient discharged from?  [place]   Does the patient have one of the following disease processes/diagnoses(primary or secondary)?  Other   Is there a successful TCM telephone encounter documented?  No   Week 1 attempt successful?  Yes   Call start time  1610   Call end time  1613   Is patient permission given to speak with other caregiver?  Yes   List who call center can speak with  Cindy,wife   Person spoke with today (if not patient) and relationship  Cindy, Wife   Meds reviewed with patient/caregiver?  Yes   Is the patient having any side effects they believe may be caused by any medication additions or changes?  No   Does the patient have all medications ordered at discharge?  Yes   Is the patient taking all medications as directed (includes completed medication regime)?  Yes   Does the patient have a primary care provider?   Yes   Comments regarding PCP  Keyanna Leone APRN 8/30   Has the patient kept scheduled appointments due by today?  N/A   What is the Home health agency?   [facility]    Has home health visited the patient within 72 hours of discharge?  Yes   Comments  uses power and wheel chair, amputee of one leg   Did the patient receive a copy of their discharge instructions?  Yes   Nursing interventions  Reviewed instructions with patient   What is the patient's perception of their health status since discharge?  Improving   Is the patient/caregiver able to teach back signs and symptoms related to disease process for when to call PCP?  Yes   Is the patient/caregiver able to teach back signs and symptoms related to disease process for when to call 911?  Yes   Is the patient/caregiver able to teach back the hierarchy of who to call/visit for symptoms/problems? PCP, Specialist, Home health nurse, Urgent Care, ED, 911  Yes   Week 1 call completed?  Yes   Wrap up additional comments  still having some nausea is better, HH comes.            Magalie Prado, RN

## 2019-08-26 ENCOUNTER — TRANSITIONAL CARE MANAGEMENT TELEPHONE ENCOUNTER (OUTPATIENT)
Dept: INTERNAL MEDICINE | Facility: CLINIC | Age: 49
End: 2019-08-26

## 2019-08-26 ENCOUNTER — TELEPHONE (OUTPATIENT)
Dept: INTERNAL MEDICINE | Facility: CLINIC | Age: 49
End: 2019-08-26

## 2019-08-26 DIAGNOSIS — IMO0002 DM (DIABETES MELLITUS) TYPE II UNCONTROLLED, PERIPH VASCULAR DISORDER: ICD-10-CM

## 2019-08-26 RX ORDER — SEVELAMER CARBONATE FOR ORAL SUSPENSION 800 MG/1
800 POWDER, FOR SUSPENSION ORAL
Qty: 90 PACKET | Refills: 0 | Status: SHIPPED | OUTPATIENT
Start: 2019-08-26 | End: 2019-11-15 | Stop reason: HOSPADM

## 2019-08-26 NOTE — OUTREACH NOTE
Pt doing better than at last d/c from hospital. No nausea or vomiting, pt is able to eat small portions and keep down. Some infrequent bouts of diarrhea controlled by medication. Pt is staying hydrated. Pt does have request for r/f on Insulin Lispro prior to his appt, wife was unsure it Pharmacy has this medication info, also requesting change in Sevelamer from powder packets to pill form. Pt wife states they were told in hospital this is available.

## 2019-08-26 NOTE — TELEPHONE ENCOUNTER
HE NEEDS HUMALOG INSULIN 100 UNITS. CALL INTO  [facility] 5 UNITS FOUR TIMES A DAY. HE IS OUT ALSO HE IS OUT OF RENVELA 0.8G PACK PACKET 800MG  THREE TIMES A DAY

## 2019-09-03 ENCOUNTER — READMISSION MANAGEMENT (OUTPATIENT)
Dept: CALL CENTER | Facility: HOSPITAL | Age: 49
End: 2019-09-03

## 2019-09-03 NOTE — OUTREACH NOTE
Medical Week 2 Survey      Responses   Facility patient discharged from?  [place]   Does the patient have one of the following disease processes/diagnoses(primary or secondary)?  Other   Week 2 attempt successful?  Yes   Call start time  1444   Unsuccessful attempts  Attempt 1   Call end time  1445   Person spoke with today (if not patient) and relationship  Cindy, Wife   Meds reviewed with patient/caregiver?  Yes   Is the patient taking all medications as directed (includes completed medication regime)?  Yes   Has the patient kept scheduled appointments due by today?  N/A   Comments  All his appts are later on this week.   What is the patient's perception of their health status since discharge?  Improving   Week 2 Call Completed?  Yes          Margi Chua RN

## 2019-09-03 NOTE — OUTREACH NOTE
Medical Week 2 Survey      Responses   Facility patient discharged from?  [place]   Does the patient have one of the following disease processes/diagnoses(primary or secondary)?  Other   Week 2 attempt successful?  No   Unsuccessful attempts  Attempt 1          Margi Chua RN

## 2019-09-10 ENCOUNTER — READMISSION MANAGEMENT (OUTPATIENT)
Dept: CALL CENTER | Facility: HOSPITAL | Age: 49
End: 2019-09-10

## 2019-09-10 ENCOUNTER — TELEPHONE (OUTPATIENT)
Dept: INTERNAL MEDICINE | Facility: CLINIC | Age: 49
End: 2019-09-10

## 2019-09-10 NOTE — OUTREACH NOTE
Medical Week 3 Survey      Responses   Facility patient discharged from?  [place]   Does the patient have one of the following disease processes/diagnoses(primary or secondary)?  Other   Week 3 attempt successful?  Yes   Call start time  1731   Call end time  1735   Discharge diagnosis  Intractable nausea and vomiting    Is patient permission given to speak with other caregiver?  Yes   List who call center can speak with  Cindy,wife   Person spoke with today (if not patient) and relationship  Cindy, Wife   Meds reviewed with patient/caregiver?  Yes   Is the patient having any side effects they believe may be caused by any medication additions or changes?  No   Does the patient have all medications ordered at discharge?  Yes   Is the patient taking all medications as directed (includes completed medication regime)?  Yes   Does the patient have a primary care provider?   Yes   Comments regarding PCP  Sees Dr Payne again in am 09/   Does the patient have an appointment with their PCP within 7 days of discharge?  Yes   Has the patient kept scheduled appointments due by today?  N/A   What is the Home health agency?   [facility]    Has home health visited the patient within 72 hours of discharge?  Yes   Psychosocial issues?  No   Did the patient receive a copy of their discharge instructions?  Yes   Nursing interventions  Reviewed instructions with patient   What is the patient's perception of their health status since discharge?  Improving   Is the patient/caregiver able to teach back signs and symptoms related to disease process for when to call PCP?  Yes   Is the patient/caregiver able to teach back signs and symptoms related to disease process for when to call 911?  Yes   Is the patient/caregiver able to teach back the hierarchy of who to call/visit for symptoms/problems? PCP, Specialist, Home health nurse, Urgent Care, ED, 911  Yes   Week 3 Call Completed?  Yes          Wellington Jung RN

## 2019-09-17 ENCOUNTER — READMISSION MANAGEMENT (OUTPATIENT)
Dept: CALL CENTER | Facility: HOSPITAL | Age: 49
End: 2019-09-17

## 2019-09-17 NOTE — OUTREACH NOTE
Medical Week 4 Survey      Responses   Facility patient discharged from?  [facility]   Does the patient have one of the following disease processes/diagnoses(primary or secondary)?  Other   Week 4 attempt successful?  Yes   Call start time  1723   Call end time  1724   Discharge diagnosis  Intractable nausea and vomiting    Is patient permission given to speak with other caregiver?  Yes   Person spoke with today (if not patient) and relationship  Cindy, Wife   Meds reviewed with patient/caregiver?  Yes   Is the patient having any side effects they believe may be caused by any medication additions or changes?  No   Is the patient taking all medications as directed (includes completed medication regime)?  Yes   Has the patient kept scheduled appointments due by today?  Yes   Comments  Reviewed appts.   Is the patient still receiving Home Health Services?  N/A   Psychosocial issues?  No   What is the patient's perception of their health status since discharge?  Improving   Is the patient/caregiver able to teach back signs and symptoms related to disease process for when to call PCP?  Yes   Is the patient/caregiver able to teach back signs and symptoms related to disease process for when to call 911?  Yes   Is the patient/caregiver able to teach back the hierarchy of who to call/visit for symptoms/problems? PCP, Specialist, Home health nurse, Urgent Care, ED, 911  Yes   Week 4 Call Completed?  Yes   Would the patient like one additional call?  No   Graduated  Yes          Kendrick Ward RN

## 2019-10-01 ENCOUNTER — OFFICE VISIT (OUTPATIENT)
Dept: INTERNAL MEDICINE | Facility: CLINIC | Age: 49
End: 2019-10-01

## 2019-10-01 VITALS
OXYGEN SATURATION: 99 % | BODY MASS INDEX: 28.6 KG/M2 | HEIGHT: 75 IN | WEIGHT: 230 LBS | TEMPERATURE: 98 F | HEART RATE: 96 BPM | SYSTOLIC BLOOD PRESSURE: 120 MMHG | RESPIRATION RATE: 18 BRPM | DIASTOLIC BLOOD PRESSURE: 82 MMHG

## 2019-10-01 DIAGNOSIS — E11.52 TYPE 2 DIABETES MELLITUS WITH DIABETIC PERIPHERAL ANGIOPATHY AND GANGRENE, WITH LONG-TERM CURRENT USE OF INSULIN (HCC): Chronic | ICD-10-CM

## 2019-10-01 DIAGNOSIS — Z00.00 MEDICARE ANNUAL WELLNESS VISIT, SUBSEQUENT: Primary | ICD-10-CM

## 2019-10-01 DIAGNOSIS — Z79.4 TYPE 2 DIABETES MELLITUS WITH DIABETIC PERIPHERAL ANGIOPATHY AND GANGRENE, WITH LONG-TERM CURRENT USE OF INSULIN (HCC): Chronic | ICD-10-CM

## 2019-10-01 PROCEDURE — G0439 PPPS, SUBSEQ VISIT: HCPCS | Performed by: NURSE PRACTITIONER

## 2019-10-01 RX ORDER — OXYCODONE HYDROCHLORIDE 10 MG/1
TABLET ORAL
COMMUNITY
Start: 2019-09-26 | End: 2019-10-23 | Stop reason: HOSPADM

## 2019-10-01 RX ORDER — HYDRALAZINE HYDROCHLORIDE 50 MG/1
TABLET, FILM COATED ORAL
COMMUNITY
Start: 2019-08-14 | End: 2019-10-23 | Stop reason: HOSPADM

## 2019-10-01 RX ORDER — POLYETHYLENE GLYCOL 3350, SODIUM SULFATE ANHYDROUS, SODIUM BICARBONATE, SODIUM CHLORIDE, POTASSIUM CHLORIDE 236; 22.74; 6.74; 5.86; 2.97 G/4L; G/4L; G/4L; G/4L; G/4L
POWDER, FOR SOLUTION ORAL
COMMUNITY
Start: 2019-07-18 | End: 2019-10-01

## 2019-10-01 NOTE — PATIENT INSTRUCTIONS
Calorie Counting for Weight Loss  Calories are units of energy. Your body needs a certain amount of calories from food to keep you going throughout the day. When you eat more calories than your body needs, your body stores the extra calories as fat. When you eat fewer calories than your body needs, your body burns fat to get the energy it needs.  Calorie counting means keeping track of how many calories you eat and drink each day. Calorie counting can be helpful if you need to lose weight. If you make sure to eat fewer calories than your body needs, you should lose weight. Ask your health care provider what a healthy weight is for you.  For calorie counting to work, you will need to eat the right number of calories in a day in order to lose a healthy amount of weight per week. A dietitian can help you determine how many calories you need in a day and will give you suggestions on how to reach your calorie goal.  · A healthy amount of weight to lose per week is usually 1-2 lb (0.5-0.9 kg). This usually means that your daily calorie intake should be reduced by 500-750 calories.  · Eating 1,200 - 1,500 calories per day can help most women lose weight.  · Eating 1,500 - 1,800 calories per day can help most men lose weight.  What is my plan?  My goal is to have __________ calories per day.  If I have this many calories per day, I should lose around __________ pounds per week.  What do I need to know about calorie counting?  In order to meet your daily calorie goal, you will need to:  · Find out how many calories are in each food you would like to eat. Try to do this before you eat.  · Decide how much of the food you plan to eat.  · Write down what you ate and how many calories it had. Doing this is called keeping a food log.  To successfully lose weight, it is important to balance calorie counting with a healthy lifestyle that includes regular activity. Aim for 150 minutes of moderate exercise (such as walking) or 75  minutes of vigorous exercise (such as running) each week.  Where do I find calorie information?    The number of calories in a food can be found on a Nutrition Facts label. If a food does not have a Nutrition Facts label, try to look up the calories online or ask your dietitian for help.  Remember that calories are listed per serving. If you choose to have more than one serving of a food, you will have to multiply the calories per serving by the amount of servings you plan to eat. For example, the label on a package of bread might say that a serving size is 1 slice and that there are 90 calories in a serving. If you eat 1 slice, you will have eaten 90 calories. If you eat 2 slices, you will have eaten 180 calories.  How do I keep a food log?  Immediately after each meal, record the following information in your food log:  · What you ate. Don't forget to include toppings, sauces, and other extras on the food.  · How much you ate. This can be measured in cups, ounces, or number of items.  · How many calories each food and drink had.  · The total number of calories in the meal.  Keep your food log near you, such as in a small notebook in your pocket, or use a mobile cady or website. Some programs will calculate calories for you and show you how many calories you have left for the day to meet your goal.  What are some calorie counting tips?    · Use your calories on foods and drinks that will fill you up and not leave you hungry:  ? Some examples of foods that fill you up are nuts and nut butters, vegetables, lean proteins, and high-fiber foods like whole grains. High-fiber foods are foods with more than 5 g fiber per serving.  ? Drinks such as sodas, specialty coffee drinks, alcohol, and juices have a lot of calories, yet do not fill you up.  · Eat nutritious foods and avoid empty calories. Empty calories are calories you get from foods or beverages that do not have many vitamins or protein, such as candy, sweets, and  "soda. It is better to have a nutritious high-calorie food (such as an avocado) than a food with few nutrients (such as a bag of chips).  · Know how many calories are in the foods you eat most often. This will help you calculate calorie counts faster.  · Pay attention to calories in drinks. Low-calorie drinks include water and unsweetened drinks.  · Pay attention to nutrition labels for \"low fat\" or \"fat free\" foods. These foods sometimes have the same amount of calories or more calories than the full fat versions. They also often have added sugar, starch, or salt, to make up for flavor that was removed with the fat.  · Find a way of tracking calories that works for you. Get creative. Try different apps or programs if writing down calories does not work for you.  What are some portion control tips?  · Know how many calories are in a serving. This will help you know how many servings of a certain food you can have.  · Use a measuring cup to measure serving sizes. You could also try weighing out portions on a kitchen scale. With time, you will be able to estimate serving sizes for some foods.  · Take some time to put servings of different foods on your favorite plates, bowls, and cups so you know what a serving looks like.  · Try not to eat straight from a bag or box. Doing this can lead to overeating. Put the amount you would like to eat in a cup or on a plate to make sure you are eating the right portion.  · Use smaller plates, glasses, and bowls to prevent overeating.  · Try not to multitask (for example, watch TV or use your computer) while eating. If it is time to eat, sit down at a table and enjoy your food. This will help you to know when you are full. It will also help you to be aware of what you are eating and how much you are eating.  What are tips for following this plan?  Reading food labels  · Check the calorie count compared to the serving size. The serving size may be smaller than what you are used to " eating.  · Check the source of the calories. Make sure the food you are eating is high in vitamins and protein and low in saturated and trans fats.  Shopping  · Read nutrition labels while you shop. This will help you make healthy decisions before you decide to purchase your food.  · Make a grocery list and stick to it.  Cooking  · Try to cook your favorite foods in a healthier way. For example, try baking instead of frying.  · Use low-fat dairy products.  Meal planning  · Use more fruits and vegetables. Half of your plate should be fruits and vegetables.  · Include lean proteins like poultry and fish.  How do I count calories when eating out?  · Ask for smaller portion sizes.  · Consider sharing an entree and sides instead of getting your own entree.  · If you get your own entree, eat only half. Ask for a box at the beginning of your meal and put the rest of your entree in it so you are not tempted to eat it.  · If calories are listed on the menu, choose the lower calorie options.  · Choose dishes that include vegetables, fruits, whole grains, low-fat dairy products, and lean protein.  · Choose items that are boiled, broiled, grilled, or steamed. Stay away from items that are buttered, battered, fried, or served with cream sauce. Items labeled “crispy” are usually fried, unless stated otherwise.  · Choose water, low-fat milk, unsweetened iced tea, or other drinks without added sugar. If you want an alcoholic beverage, choose a lower calorie option such as a glass of wine or light beer.  · Ask for dressings, sauces, and syrups on the side. These are usually high in calories, so you should limit the amount you eat.  · If you want a salad, choose a garden salad and ask for grilled meats. Avoid extra toppings like goetz, cheese, or fried items. Ask for the dressing on the side, or ask for olive oil and vinegar or lemon to use as dressing.  · Estimate how many servings of a food you are given. For example, a serving of  cooked rice is ½ cup or about the size of half a baseball. Knowing serving sizes will help you be aware of how much food you are eating at restaurants. The list below tells you how big or small some common portion sizes are based on everyday objects:  ? 1 oz--4 stacked dice.  ? 3 oz--1 deck of cards.  ? 1 tsp--1 die.  ? 1 Tbsp--½ a ping-pong ball.  ? 2 Tbsp--1 ping-pong ball.  ? ½ cup--½ baseball.  ? 1 cup--1 baseball.  Summary  · Calorie counting means keeping track of how many calories you eat and drink each day. If you eat fewer calories than your body needs, you should lose weight.  · A healthy amount of weight to lose per week is usually 1-2 lb (0.5-0.9 kg). This usually means reducing your daily calorie intake by 500-750 calories.  · The number of calories in a food can be found on a Nutrition Facts label. If a food does not have a Nutrition Facts label, try to look up the calories online or ask your dietitian for help.  · Use your calories on foods and drinks that will fill you up, and not on foods and drinks that will leave you hungry.  · Use smaller plates, glasses, and bowls to prevent overeating.  This information is not intended to replace advice given to you by your health care provider. Make sure you discuss any questions you have with your health care provider.  Document Released: 12/18/2006 Document Revised: 11/17/2017 Document Reviewed: 11/17/2017  [employer] Interactive Patient Education © 2019 [employer]

## 2019-10-01 NOTE — PROGRESS NOTES
The ABCs of the Annual Wellness Visit  Subsequent Medicare Wellness Visit    Chief Complaint   Patient presents with   • Annual Exam       Subjective   History of Present Illness:  Kendrick Crystal is a 49 y.o. male who presents for a Subsequent Medicare Wellness Visit.    HEALTH RISK ASSESSMENT    Recent Hospitalizations:  Recently treated at the following:  [facility]    Current Medical Providers:  Patient Care Team:  Keyanna Leone APRN as PCP - General (Nurse Practitioner)    Smoking Status:  Social History     Tobacco Use   Smoking Status Never Smoker   Smokeless Tobacco Never Used       Alcohol Consumption:  Social History     Substance and Sexual Activity   Alcohol Use No       Depression Screen:   PHQ-2/PHQ-9 Depression Screening 10/1/2019   Little interest or pleasure in doing things 1   Feeling down, depressed, or hopeless 1   Trouble falling or staying asleep, or sleeping too much 2   Feeling tired or having little energy 2   Poor appetite or overeating 1   Feeling bad about yourself - or that you are a failure or have let yourself or your family down 3   Trouble concentrating on things, such as reading the newspaper or watching television 1   Moving or speaking so slowly that other people could have noticed. Or the opposite - being so fidgety or restless that you have been moving around a lot more than usual 0   Thoughts that you would be better off dead, or of hurting yourself in some way 0   Total Score 11   If you checked off any problems, how difficult have these problems made it for you to do your work, take care of things at home, or get along with other people? Not difficult at all       Fall Risk Screen:  CYNDY Fall Risk Assessment has not been completed.    Health Habits and Functional and Cognitive Screening:  Functional & Cognitive Status 10/1/2019   Do you have difficulty preparing food and eating? Yes   Do you have difficulty bathing yourself, getting dressed or grooming  yourself? Yes   Do you have difficulty using the toilet? Yes   Do you have difficulty moving around from place to place? Yes   Current Diet Unhealthy Diet   Dental Exam Up to date   Eye Exam Not up to date   Exercise (times per week) 0 times per week   Current Exercise Activities Include None   Do you need help using the phone?  No   Are you deaf or do you have serious difficulty hearing?  Yes   Do you need help with transportation? Yes   Do you need help shopping? Yes   Do you need help preparing meals?  Yes   Do you need help with housework?  Yes   Do you need help with laundry? Yes   Do you need help taking your medications? No   Do you need help managing money? No   Do you ever drive or ride in a car without wearing a seat belt? No   Have you felt unusual stress, anger or loneliness in the last month? Yes   Who do you live with? Spouse   If you need help, do you have trouble finding someone available to you? No   Have you been bothered in the last four weeks by sexual problems? No   Do you have difficulty concentrating, remembering or making decisions? No         Does the patient have evidence of cognitive impairment? No    Asprin use counseling:Start ASA 81 mg daily     Age-appropriate Screening Schedule:  Refer to the list below for future screening recommendations based on patient's age, sex and/or medical conditions. Orders for these recommended tests are listed in the plan section. The patient has been provided with a written plan.    Health Maintenance   Topic Date Due   • URINE MICROALBUMIN  1970   • TDAP/TD VACCINES (1 - Tdap) 09/11/1987   • ZOSTER VACCINE (1 of 2) 09/11/2018   • DIABETIC EYE EXAM  08/13/2019   • PNEUMOCOCCAL VACCINE (19-64 MEDIUM RISK) (1 of 1 - PPSV23) 10/01/2019 (Originally 9/11/1987)   • HEMOGLOBIN A1C  01/31/2020   • DIABETIC FOOT EXAM  07/12/2020   • LIPID PANEL  07/12/2020   • COLONOSCOPY  06/18/2025   • INFLUENZA VACCINE  Addressed          The following portions of the  patient's history were reviewed and updated as appropriate: allergies, current medications, past family history, past medical history, past social history, past surgical history and problem list.    Outpatient Medications Prior to Visit   Medication Sig Dispense Refill   • amLODIPine (NORVASC) 10 MG tablet Take 1 tablet by mouth Daily. 30 tablet 0   • DULoxetine (CYMBALTA) 30 MG capsule Take 1 capsule by mouth Daily. 90 capsule 1   • hydrALAZINE (APRESOLINE) 50 MG tablet      • HYDROcodone-acetaminophen (NORCO)  MG per tablet Take 1 tablet by mouth 3 (Three) Times a Day As Needed for Moderate Pain  or Severe Pain .     • insulin lispro (humaLOG) 100 UNIT/ML injection Inject 5 Units under the skin into the appropriate area as directed 4 (Four) Times a Day With Meals & at Bedtime. Plus sliding scale 60 mL 5   • LANTUS 100 UNIT/ML injection Inject 30 Units under the skin into the appropriate area as directed 2 (Two) Times a Day. 60 mL 1   • metoprolol tartrate (LOPRESSOR) 100 MG tablet Take 100 mg by mouth Every 12 (Twelve) Hours.     • nystatin (MYCOSTATIN) 759345 UNIT/GM cream Apply  topically to the appropriate area as directed 2 (Two) Times a Day. 30 g 0   • oxyCODONE (ROXICODONE) 10 MG tablet      • promethazine (PHENERGAN) 12.5 MG tablet Take 1 tablet by mouth Every 6 (Six) Hours As Needed for Nausea or Vomiting. 20 tablet 0   • sevelamer (RENVELA) 0.8 g pack packet Take 1 packet by mouth 3 (Three) Times a Day With Meals. 90 packet 0   • tamsulosin (FLOMAX) 0.4 MG capsule 24 hr capsule Take 2 capsules by mouth Every Night. 180 capsule 1   • terazosin (HYTRIN) 5 MG capsule Take 1 capsule by mouth Every Night. 30 capsule 0   • PEG 3350-KCl-NaBcb-NaCl-NaSulf (PEG-3350/ELECTROLYTES) 236 g reconstituted solution        No facility-administered medications prior to visit.        Patient Active Problem List   Diagnosis   • DUNLAP Cirrhosis   • Essential hypertension   • Non-compliance   • Hyperlipemia   •  Hypertriglyceridemia, essential   • Sepsis affecting skin   • Type 2 diabetes mellitus with circulatory disorder and uncontrolled   • History of MRSA infection   • S/P BKA (below knee amputation), left (CMS/[facility])   • Peripheral vascular disease (CMS/[facility])   • DM (diabetes mellitus) type II uncontrolled, periph vascular disorder (CMS/[facility])   • Obesity (BMI 30-39.9)   • Gastroparesis       Advanced Care Planning:  Patient does not have an advance directive - information provided to the patient today    Review of Systems   Constitutional: Positive for fatigue. Negative for activity change, appetite change, fever and unexpected weight change.   HENT: Negative for rhinorrhea, sinus pain and trouble swallowing.    Eyes: Negative for photophobia and pain.   Respiratory: Negative for chest tightness, shortness of breath and wheezing.    Cardiovascular: Positive for leg swelling. Negative for chest pain and palpitations.   Gastrointestinal: Positive for abdominal pain, diarrhea and nausea. Negative for abdominal distention, blood in stool and constipation.   Endocrine: Positive for heat intolerance, polydipsia, polyphagia and polyuria. Negative for cold intolerance.   Genitourinary: Negative for difficulty urinating, enuresis, frequency, genital sores and urgency.   Musculoskeletal: Positive for arthralgias, back pain, gait problem and myalgias. Negative for joint swelling.   Skin: Positive for wound. Negative for pallor and rash.   Allergic/Immunologic: Negative for immunocompromised state.   Neurological: Positive for weakness and headaches. Negative for dizziness, tremors, seizures, syncope and speech difficulty.   Hematological: Negative for adenopathy.   Psychiatric/Behavioral: Positive for dysphoric mood and sleep disturbance. Negative for suicidal ideas. The patient is nervous/anxious.        Compared to one year ago, the patient feels his physical health is worse.  Compared to one year ago, the patient feels his mental  "health is the same.    Reviewed chart for potential of high risk medication in the elderly: yes  Reviewed chart for potential of harmful drug interactions in the elderly:yes    Objective         Vitals:    10/01/19 1104   BP: 120/82   Pulse: 96   Resp: 18   Temp: 98 °F (36.7 °C)   TempSrc: Temporal   SpO2: 99%   Weight: 104 kg (230 lb)   Height: 190.5 cm (75\")   PainSc:   5       Body mass index is 28.75 kg/m².  Discussed the patient's BMI with him. The BMI is above average; BMI management plan is completed.    Physical Exam   Constitutional: He is oriented to person, place, and time. He appears well-developed and well-nourished. No distress.   HENT:   Head: Normocephalic and atraumatic.   Eyes: Pupils are equal, round, and reactive to light.   Neck: Neck supple. No thyromegaly present.   Cardiovascular: Normal rate and regular rhythm.   Pulmonary/Chest: Effort normal and breath sounds normal. He has no decreased breath sounds. He has no wheezes. He has no rhonchi. He has no rales.   Abdominal: Bowel sounds are increased. There is generalized tenderness. There is no rigidity and no guarding.   Neurological: He is alert and oriented to person, place, and time.   Skin: Skin is warm and dry. Capillary refill takes 2 to 3 seconds. He is not diaphoretic.   Psychiatric: He has a normal mood and affect. His behavior is normal. Judgment and thought content normal.   Nursing note and vitals reviewed.      Lab Results   Component Value Date    TRIG 438 (H) 07/12/2019    HDL 25 (L) 07/12/2019    LDL  07/12/2019      Comment:      Unable to calculate     (H) 07/12/2019    VLDL  07/12/2019      Comment:      Unable to calculate    HGBA1C 14.10 (H) 07/31/2019        Assessment/Plan   Medicare Risks and Personalized Health Plan  CMS Preventative Services Quick Reference  Advance Directive Discussion  Chronic Pain     The above risks/problems have been discussed with the patient.  Pertinent information has been shared with " the patient in the After Visit Summary.  Follow up plans and orders are seen below in the Assessment/Plan Section.    Diagnoses and all orders for this visit:    1. Medicare annual wellness visit, subsequent (Primary)  Counseled regarding: age-appropriate screening labs and tests, wearing seatbelt and sunscreen regularly  Discussed: regular exercise and diet changes to promote healthy weight  2. Type 2 diabetes mellitus with diabetic peripheral angiopathy and gangrene, with long-term current use of insulin (CMS/[facility])  -     aspirin 81 MG tablet; Take 1 tablet by mouth Daily.  Dispense: 30 tablet; Refill: 11      Follow Up:  No Follow-up on file.     An After Visit Summary and PPPS were given to the patient.

## 2019-10-08 ENCOUNTER — APPOINTMENT (OUTPATIENT)
Dept: CT IMAGING | Facility: HOSPITAL | Age: 49
End: 2019-10-08

## 2019-10-08 ENCOUNTER — HOSPITAL ENCOUNTER (INPATIENT)
Facility: HOSPITAL | Age: 49
LOS: 15 days | Discharge: HOME HEALTH | End: 2019-10-23
Attending: EMERGENCY MEDICINE | Admitting: INTERNAL MEDICINE

## 2019-10-08 ENCOUNTER — APPOINTMENT (OUTPATIENT)
Dept: GENERAL RADIOLOGY | Facility: HOSPITAL | Age: 49
End: 2019-10-08

## 2019-10-08 DIAGNOSIS — Z74.09 IMPAIRED MOBILITY AND ADLS: ICD-10-CM

## 2019-10-08 DIAGNOSIS — E86.0 DEHYDRATION: ICD-10-CM

## 2019-10-08 DIAGNOSIS — K74.60 CIRRHOSIS OF LIVER WITHOUT ASCITES, UNSPECIFIED HEPATIC CIRRHOSIS TYPE (HCC): ICD-10-CM

## 2019-10-08 DIAGNOSIS — Z89.512 S/P BKA (BELOW KNEE AMPUTATION), LEFT (HCC): ICD-10-CM

## 2019-10-08 DIAGNOSIS — B96.1 BACTEREMIA DUE TO KLEBSIELLA PNEUMONIAE: ICD-10-CM

## 2019-10-08 DIAGNOSIS — E11.65 UNCONTROLLED TYPE 2 DIABETES MELLITUS WITH HYPERGLYCEMIA (HCC): ICD-10-CM

## 2019-10-08 DIAGNOSIS — I10 ESSENTIAL HYPERTENSION: ICD-10-CM

## 2019-10-08 DIAGNOSIS — Z78.9 IMPAIRED MOBILITY AND ADLS: ICD-10-CM

## 2019-10-08 DIAGNOSIS — N15.1 PERINEPHRIC ABSCESS: Primary | ICD-10-CM

## 2019-10-08 DIAGNOSIS — IMO0002 DM (DIABETES MELLITUS) TYPE II UNCONTROLLED, PERIPH VASCULAR DISORDER: ICD-10-CM

## 2019-10-08 DIAGNOSIS — A41.9 SEPSIS, DUE TO UNSPECIFIED ORGANISM, UNSPECIFIED WHETHER ACUTE ORGAN DYSFUNCTION PRESENT (HCC): ICD-10-CM

## 2019-10-08 DIAGNOSIS — E66.9 OBESITY (BMI 30-39.9): ICD-10-CM

## 2019-10-08 DIAGNOSIS — Z86.14 HISTORY OF MRSA INFECTION: ICD-10-CM

## 2019-10-08 DIAGNOSIS — Z91.14 NON COMPLIANCE W MEDICATION REGIMEN: ICD-10-CM

## 2019-10-08 DIAGNOSIS — R78.81 BACTEREMIA DUE TO KLEBSIELLA PNEUMONIAE: ICD-10-CM

## 2019-10-08 PROBLEM — N39.0 ACUTE UTI (URINARY TRACT INFECTION): Status: ACTIVE | Noted: 2019-10-08

## 2019-10-08 LAB
ALBUMIN SERPL-MCNC: 2.8 G/DL (ref 3.5–5.2)
ALBUMIN/GLOB SERPL: 0.7 G/DL
ALP SERPL-CCNC: 207 U/L (ref 39–117)
ALT SERPL W P-5'-P-CCNC: 10 U/L (ref 1–41)
ANION GAP SERPL CALCULATED.3IONS-SCNC: 17 MMOL/L (ref 5–15)
AST SERPL-CCNC: 9 U/L (ref 1–40)
ATMOSPHERIC PRESS: ABNORMAL MM[HG]
B-OH-BUTYR SERPL-SCNC: 2.94 MMOL/L (ref 0.02–0.27)
BACTERIA UR QL AUTO: ABNORMAL /HPF
BASE EXCESS BLDV CALC-SCNC: -0.7 MMOL/L (ref -2–2)
BASOPHILS # BLD AUTO: 0.08 10*3/MM3 (ref 0–0.2)
BASOPHILS NFR BLD AUTO: 0.3 % (ref 0–1.5)
BDY SITE: ABNORMAL
BILIRUB SERPL-MCNC: 0.4 MG/DL (ref 0.2–1.2)
BILIRUB UR QL STRIP: NEGATIVE
BODY TEMPERATURE: 37 C
BUN BLD-MCNC: 33 MG/DL (ref 6–20)
BUN/CREAT SERPL: 38.8 (ref 7–25)
CALCIUM SPEC-SCNC: 9.2 MG/DL (ref 8.6–10.5)
CHLORIDE SERPL-SCNC: 84 MMOL/L (ref 98–107)
CLARITY UR: ABNORMAL
CO2 BLDA-SCNC: 24.4 MMOL/L (ref 22–33)
CO2 SERPL-SCNC: 23 MMOL/L (ref 22–29)
COHGB MFR BLD: 1.6 %
COLOR UR: YELLOW
CREAT BLD-MCNC: 0.85 MG/DL (ref 0.76–1.27)
D-LACTATE SERPL-SCNC: 1.8 MMOL/L (ref 0.5–2)
DEPRECATED RDW RBC AUTO: 42.9 FL (ref 37–54)
EOSINOPHIL # BLD AUTO: 0.05 10*3/MM3 (ref 0–0.4)
EOSINOPHIL NFR BLD AUTO: 0.2 % (ref 0.3–6.2)
ERYTHROCYTE [DISTWIDTH] IN BLOOD BY AUTOMATED COUNT: 13.9 % (ref 12.3–15.4)
GFR SERPL CREATININE-BSD FRML MDRD: 95 ML/MIN/1.73
GLOBULIN UR ELPH-MCNC: 4.1 GM/DL
GLUCOSE BLD-MCNC: 449 MG/DL (ref 65–99)
GLUCOSE BLDC GLUCOMTR-MCNC: 295 MG/DL (ref 70–130)
GLUCOSE BLDC GLUCOMTR-MCNC: 405 MG/DL (ref 70–130)
GLUCOSE UR STRIP-MCNC: ABNORMAL MG/DL
HBA1C MFR BLD: 14.1 % (ref 4.8–5.6)
HCO3 BLDV-SCNC: 23.3 MMOL/L (ref 22–28)
HCT VFR BLD AUTO: 34.6 % (ref 37.5–51)
HGB BLD-MCNC: 11.4 G/DL (ref 13–17.7)
HGB BLDA-MCNC: 11.1 G/DL (ref 13.5–17.5)
HGB UR QL STRIP.AUTO: ABNORMAL
HOROWITZ INDEX BLD+IHG-RTO: 21 %
IMM GRANULOCYTES # BLD AUTO: 0.36 10*3/MM3 (ref 0–0.05)
IMM GRANULOCYTES NFR BLD AUTO: 1.2 % (ref 0–0.5)
KETONES UR QL STRIP: ABNORMAL
LEUKOCYTE ESTERASE UR QL STRIP.AUTO: ABNORMAL
LIPASE SERPL-CCNC: 9 U/L (ref 13–60)
LYMPHOCYTES # BLD AUTO: 1.14 10*3/MM3 (ref 0.7–3.1)
LYMPHOCYTES NFR BLD AUTO: 3.7 % (ref 19.6–45.3)
MAGNESIUM SERPL-MCNC: 1.7 MG/DL (ref 1.6–2.6)
MCH RBC QN AUTO: 27.7 PG (ref 26.6–33)
MCHC RBC AUTO-ENTMCNC: 32.9 G/DL (ref 31.5–35.7)
MCV RBC AUTO: 84 FL (ref 79–97)
METHGB BLD QL: 0.9 %
MODALITY: ABNORMAL
MONOCYTES # BLD AUTO: 1.78 10*3/MM3 (ref 0.1–0.9)
MONOCYTES NFR BLD AUTO: 5.8 % (ref 5–12)
NEUTROPHILS # BLD AUTO: 27.51 10*3/MM3 (ref 1.7–7)
NEUTROPHILS NFR BLD AUTO: 88.8 % (ref 42.7–76)
NITRITE UR QL STRIP: POSITIVE
NOTE: ABNORMAL
NRBC BLD AUTO-RTO: 0.1 /100 WBC (ref 0–0.2)
OXYHGB MFR BLDV: 83.9 %
PCO2 BLDV: 35.4 MM HG (ref 41–51)
PH BLDV: 7.43 PH UNITS
PH UR STRIP.AUTO: <=5 [PH] (ref 5–8)
PHOSPHATE SERPL-MCNC: 3.3 MG/DL (ref 2.5–4.5)
PLAT MORPH BLD: NORMAL
PLATELET # BLD AUTO: 257 10*3/MM3 (ref 140–450)
PMV BLD AUTO: 12.7 FL (ref 6–12)
PO2 BLDV: 50 MM HG (ref 27–53)
POTASSIUM BLD-SCNC: 4.3 MMOL/L (ref 3.5–5.2)
PROT SERPL-MCNC: 6.9 G/DL (ref 6–8.5)
PROT UR QL STRIP: NEGATIVE
RBC # BLD AUTO: 4.12 10*6/MM3 (ref 4.14–5.8)
RBC # UR: ABNORMAL /HPF
RBC MORPH BLD: NORMAL
REF LAB TEST METHOD: ABNORMAL
SODIUM BLD-SCNC: 124 MMOL/L (ref 136–145)
SP GR UR STRIP: 1.03 (ref 1–1.03)
SQUAMOUS #/AREA URNS HPF: ABNORMAL /[HPF]
TROPONIN T SERPL-MCNC: 0.04 NG/ML (ref 0–0.03)
UROBILINOGEN UR QL STRIP: ABNORMAL
VENTILATOR MODE: ABNORMAL
WBC MORPH BLD: NORMAL
WBC NRBC COR # BLD: 30.92 10*3/MM3 (ref 3.4–10.8)
WBC UR QL AUTO: ABNORMAL /HPF

## 2019-10-08 PROCEDURE — 87150 DNA/RNA AMPLIFIED PROBE: CPT | Performed by: PHYSICIAN ASSISTANT

## 2019-10-08 PROCEDURE — 82010 KETONE BODYS QUAN: CPT | Performed by: PHYSICIAN ASSISTANT

## 2019-10-08 PROCEDURE — 82805 BLOOD GASES W/O2 SATURATION: CPT

## 2019-10-08 PROCEDURE — 74177 CT ABD & PELVIS W/CONTRAST: CPT

## 2019-10-08 PROCEDURE — 87186 SC STD MICRODIL/AGAR DIL: CPT | Performed by: PHYSICIAN ASSISTANT

## 2019-10-08 PROCEDURE — 84484 ASSAY OF TROPONIN QUANT: CPT | Performed by: PHYSICIAN ASSISTANT

## 2019-10-08 PROCEDURE — 87040 BLOOD CULTURE FOR BACTERIA: CPT | Performed by: PHYSICIAN ASSISTANT

## 2019-10-08 PROCEDURE — 99285 EMERGENCY DEPT VISIT HI MDM: CPT

## 2019-10-08 PROCEDURE — 87086 URINE CULTURE/COLONY COUNT: CPT | Performed by: INTERNAL MEDICINE

## 2019-10-08 PROCEDURE — 87077 CULTURE AEROBIC IDENTIFY: CPT | Performed by: INTERNAL MEDICINE

## 2019-10-08 PROCEDURE — 84100 ASSAY OF PHOSPHORUS: CPT | Performed by: PHYSICIAN ASSISTANT

## 2019-10-08 PROCEDURE — 25010000002 IOPAMIDOL 61 % SOLUTION: Performed by: EMERGENCY MEDICINE

## 2019-10-08 PROCEDURE — 80053 COMPREHEN METABOLIC PANEL: CPT | Performed by: PHYSICIAN ASSISTANT

## 2019-10-08 PROCEDURE — 85007 BL SMEAR W/DIFF WBC COUNT: CPT | Performed by: PHYSICIAN ASSISTANT

## 2019-10-08 PROCEDURE — 87077 CULTURE AEROBIC IDENTIFY: CPT | Performed by: PHYSICIAN ASSISTANT

## 2019-10-08 PROCEDURE — 71046 X-RAY EXAM CHEST 2 VIEWS: CPT

## 2019-10-08 PROCEDURE — 82820 HEMOGLOBIN-OXYGEN AFFINITY: CPT

## 2019-10-08 PROCEDURE — 81001 URINALYSIS AUTO W/SCOPE: CPT | Performed by: PHYSICIAN ASSISTANT

## 2019-10-08 PROCEDURE — 99223 1ST HOSP IP/OBS HIGH 75: CPT | Performed by: INTERNAL MEDICINE

## 2019-10-08 PROCEDURE — 83690 ASSAY OF LIPASE: CPT | Performed by: PHYSICIAN ASSISTANT

## 2019-10-08 PROCEDURE — 83735 ASSAY OF MAGNESIUM: CPT | Performed by: PHYSICIAN ASSISTANT

## 2019-10-08 PROCEDURE — 83605 ASSAY OF LACTIC ACID: CPT | Performed by: PHYSICIAN ASSISTANT

## 2019-10-08 PROCEDURE — 87186 SC STD MICRODIL/AGAR DIL: CPT | Performed by: INTERNAL MEDICINE

## 2019-10-08 PROCEDURE — 25010000002 PIPERACILLIN SOD-TAZOBACTAM PER 1 G: Performed by: PHYSICIAN ASSISTANT

## 2019-10-08 PROCEDURE — 82962 GLUCOSE BLOOD TEST: CPT

## 2019-10-08 PROCEDURE — 85025 COMPLETE CBC W/AUTO DIFF WBC: CPT | Performed by: PHYSICIAN ASSISTANT

## 2019-10-08 PROCEDURE — 83036 HEMOGLOBIN GLYCOSYLATED A1C: CPT | Performed by: PHYSICIAN ASSISTANT

## 2019-10-08 RX ORDER — SODIUM CHLORIDE 0.9 % (FLUSH) 0.9 %
10 SYRINGE (ML) INJECTION AS NEEDED
Status: DISCONTINUED | OUTPATIENT
Start: 2019-10-08 | End: 2019-10-08

## 2019-10-08 RX ORDER — SODIUM CHLORIDE 9 MG/ML
10 INJECTION, SOLUTION INTRAVENOUS CONTINUOUS PRN
Status: DISCONTINUED | OUTPATIENT
Start: 2019-10-08 | End: 2019-10-08

## 2019-10-08 RX ORDER — SODIUM CHLORIDE AND POTASSIUM CHLORIDE 150; 900 MG/100ML; MG/100ML
250 INJECTION, SOLUTION INTRAVENOUS CONTINUOUS PRN
Status: DISCONTINUED | OUTPATIENT
Start: 2019-10-08 | End: 2019-10-08

## 2019-10-08 RX ORDER — DEXTROSE, SODIUM CHLORIDE, AND POTASSIUM CHLORIDE 5; .45; .15 G/100ML; G/100ML; G/100ML
250 INJECTION INTRAVENOUS CONTINUOUS PRN
Status: DISCONTINUED | OUTPATIENT
Start: 2019-10-08 | End: 2019-10-08

## 2019-10-08 RX ORDER — SODIUM CHLORIDE 0.9 % (FLUSH) 0.9 %
10 SYRINGE (ML) INJECTION ONCE AS NEEDED
Status: DISCONTINUED | OUTPATIENT
Start: 2019-10-08 | End: 2019-10-08

## 2019-10-08 RX ORDER — DEXTROSE MONOHYDRATE 25 G/50ML
12.5 INJECTION, SOLUTION INTRAVENOUS AS NEEDED
Status: DISCONTINUED | OUTPATIENT
Start: 2019-10-08 | End: 2019-10-08

## 2019-10-08 RX ADMIN — INSULIN HUMAN 11 UNITS/HR: 1 INJECTION, SOLUTION INTRAVENOUS at 22:14

## 2019-10-08 RX ADMIN — SODIUM CHLORIDE 1000 ML: 9 INJECTION, SOLUTION INTRAVENOUS at 20:30

## 2019-10-08 RX ADMIN — IOPAMIDOL 95 ML: 612 INJECTION, SOLUTION INTRAVENOUS at 21:24

## 2019-10-08 RX ADMIN — TAZOBACTAM SODIUM AND PIPERACILLIN SODIUM 3.38 G: 375; 3 INJECTION, SOLUTION INTRAVENOUS at 23:46

## 2019-10-08 RX ADMIN — SODIUM CHLORIDE 2000 ML: 9 INJECTION, SOLUTION INTRAVENOUS at 21:48

## 2019-10-08 NOTE — ED PROVIDER NOTES
Subjective    Kendrick Crystal is a 49 y.o. male who presents to the ED with complaints of right sided abdominal pain that began 2 days ago.  Blood glucose has been over 500 for the past 3 days.  He has had intermittent chills and sweats.  He also complains of nausea, vomiting, and generalized weakness.  He was discharged from this facility on 8/14/2019 with sepsis perirectal abscess rectal fistula with operative drainage on 8/2 per Dr. Payne.  Cultures were positive for E. coli and Klebsiella.  Infectious disease was consulted.  He was discharged to home with Ceftin and Flagyl.  He returned to the emergency department on 8/17 with worsening leukocytosis lactic acidosis UTI.  He denies dysuria, cough, congestion, headache, chest pain, and any visual changes. He has a history of non alcohol cirrhosis and diabetes. There are no other acute complaints at this time.         History provided by:  Patient  Abdominal Pain   Pain severity:  Moderate  Onset quality:  Sudden  Chronicity:  New  Associated symptoms: nausea and vomiting    Associated symptoms: no chest pain, no cough and no dysuria        Review of Systems   HENT: Negative for congestion.    Eyes: Negative for visual disturbance.   Respiratory: Negative for cough.    Cardiovascular: Negative for chest pain.   Gastrointestinal: Positive for abdominal pain, nausea and vomiting.   Genitourinary: Negative for dysuria.   Neurological: Positive for weakness. Negative for headaches.   All other systems reviewed and are negative.      Past Medical History:   Diagnosis Date   • Abdominal wall cellulitis 5/20/2019   • JUAN (acute kidney injury) (CMS/HCC) 7/29/2018   • Arthritis    • Bipolar 1 disorder (CMS/[facility])    • Cellulitis of right anterior lower leg 7/29/2018   • Cirrhosis (CMS/HCC)    • Counseling for insulin pump    • Depression    • Diabetes mellitus (CMS/HCC)    • Encephalopathy, hepatic (CMS/HCC)    • H/O degenerative disc disease    • History of Lissa's gangrene      right leg/testicle   • Hyperlipemia    • Hypertension    • multiple Skin abscesses    • DUNLAP, bx showed stage IV fibrosis    • Neuropathy    • Peripheral vascular disease (CMS/HCC)    • Thrombophlebitis        No Known Allergies    Past Surgical History:   Procedure Laterality Date   • ABDOMINAL WALL ABSCESS INCISION AND DRAINAGE N/A 4/26/2019    Procedure: ABDOMINAL WALL DEBRIDEMENT;  Surgeon: Fadi Kern MD;  Location:  [facility];  Service: General   • AMPUTATION DIGIT Left 1/30/2017    Procedure: left fourth and fifth transmetatarsal toe amputation ;  Surgeon: Juancho Martinez MD;  Location:  [facility];  Service:    • BELOW KNEE AMPUTATION Left 3/2/2017    Procedure: AMPUTATION BELOW KNEE, SHMUEL;  Surgeon: Juancho Martinez MD;  Location:  [facility];  Service:    • ENDOSCOPY     • HEMORRHOIDECTOMY N/A 8/2/2019    Procedure: EXCISION AND DRAIN PERIRECTAL ABSCESS;  Surgeon: Mumtaz Payne MD;  Location:  [facility];  Service: General   • LEG SURGERY     • TESTICLE SURGERY Right     DEBRIDEMENT FROM GANGRENE   • TONSILLECTOMY  1975       Family History   Problem Relation Age of Onset   • Arthritis Mother    • Hypertension Mother    • Kidney disease Mother    • Stroke Mother    • Heart attack Father    • Arthritis Father    • Diabetes Father    • Hyperlipidemia Father    • Hypertension Father    • Mental illness Sister    • Diabetes Brother    • Hypertension Brother    • Obesity Brother        Social History     Socioeconomic History   • Marital status:      Spouse name: Not on file   • Number of children: 1   • Years of education: Not on file   • Highest education level: Not on file   Occupational History   • Occupation: Security     Comment: Disabled-Diabetic Retinopathy   Tobacco Use   • Smoking status: Never Smoker   • Smokeless tobacco: Never Used   Substance and Sexual Activity   • Alcohol use: No   • Drug use: No   • Sexual activity: Defer   Social History Narrative    Lives in [facility]          Objective   Physical Exam   Constitutional: He is oriented to person, place, and time. He appears well-developed and well-nourished.   HENT:   Head: Normocephalic and atraumatic.   Right Ear: External ear normal.   Left Ear: External ear normal.   Nose: Nose normal.   Mouth/Throat: Oropharynx is clear and moist. No oropharyngeal exudate.   Eyes: Conjunctivae and EOM are normal. Pupils are equal, round, and reactive to light. Right eye exhibits no discharge. Left eye exhibits no discharge. No scleral icterus.   Neck: Normal range of motion. Neck supple. No JVD present. No tracheal deviation present.   Cardiovascular: Normal rate, regular rhythm, normal heart sounds and intact distal pulses. Exam reveals no gallop and no friction rub.   No murmur heard.  Pulmonary/Chest: Effort normal and breath sounds normal. No stridor. No respiratory distress. He has no rales.   Abdominal: Soft. Bowel sounds are normal. There is tenderness. There is no rebound and no guarding.   Mild diffuse right lower quadrant tenderness.    Musculoskeletal: Normal range of motion.   Lymphadenopathy:     He has no cervical adenopathy.   Neurological: He is alert and oriented to person, place, and time.   Skin: Skin is warm and dry.   Psychiatric: He has a normal mood and affect. His behavior is normal.   Nursing note and vitals reviewed.      Procedures         ED Course  ED Course as of Oct 08 2325   Tue Oct 08, 2019   2118 Beta-Hydroxybutyrate Quant: (!) 2.935 [TG]   2118 Glucose: (!!) 449 [TG]   2118 BUN: (!) 33 [TG]   2118 Sodium: (!) 124 [TG]   2118 Chloride: (!) 84 [TG]   2118 Albumin: (!) 2.80 [TG]   2118 Anion Gap: (!) 17.0 [TG]   2118 WBC: (!!) 30.92 [TG]   2222 IMPRESSION:  1.  Small mixed attenuation right perinephric fluid collection along the inferior capsule, not present on the prior exam. Perinephric hematoma is a consideration. Correlate clinically for any history of blunt flank trauma. While abscess is possible, this  is  unlikely as right renal parenchyma enhances normally with no evidence of pyelonephritis. Laboratory correlation recommended.  2.  Advanced hepatic cirrhosis. Splenomegaly.  3.  Urinary bladder wall thickening. Gas within the bladder is most likely iatrogenic.  4.  Benign left adrenal myelolipoma.  [TG]   2304 pH, Venous: 7.427 [TG]   2319 Spoke with Dr. Marks, who will see patient in consult.  [TG]      ED Course User Index  [TG] Chang Can PA-C     Recent Results (from the past 24 hour(s))   Lipase    Collection Time: 10/08/19  8:08 PM   Result Value Ref Range    Lipase 9 (L) 13 - 60 U/L   Comprehensive Metabolic Panel    Collection Time: 10/08/19  8:08 PM   Result Value Ref Range    Glucose 449 (C) 65 - 99 mg/dL    BUN 33 (H) 6 - 20 mg/dL    Creatinine 0.85 0.76 - 1.27 mg/dL    Sodium 124 (L) 136 - 145 mmol/L    Potassium 4.3 3.5 - 5.2 mmol/L    Chloride 84 (L) 98 - 107 mmol/L    CO2 23.0 22.0 - 29.0 mmol/L    Calcium 9.2 8.6 - 10.5 mg/dL    Total Protein 6.9 6.0 - 8.5 g/dL    Albumin 2.80 (L) 3.50 - 5.20 g/dL    ALT (SGPT) 10 1 - 41 U/L    AST (SGOT) 9 1 - 40 U/L    Alkaline Phosphatase 207 (H) 39 - 117 U/L    Total Bilirubin 0.4 0.2 - 1.2 mg/dL    eGFR Non African Amer 95 >60 mL/min/1.73    Globulin 4.1 gm/dL    A/G Ratio 0.7 g/dL    BUN/Creatinine Ratio 38.8 (H) 7.0 - 25.0    Anion Gap 17.0 (H) 5.0 - 15.0 mmol/L   CBC Auto Differential    Collection Time: 10/08/19  8:08 PM   Result Value Ref Range    WBC 30.92 (C) 3.40 - 10.80 10*3/mm3    RBC 4.12 (L) 4.14 - 5.80 10*6/mm3    Hemoglobin 11.4 (L) 13.0 - 17.7 g/dL    Hematocrit 34.6 (L) 37.5 - 51.0 %    MCV 84.0 79.0 - 97.0 fL    MCH 27.7 26.6 - 33.0 pg    MCHC 32.9 31.5 - 35.7 g/dL    RDW 13.9 12.3 - 15.4 %    RDW-SD 42.9 37.0 - 54.0 fl    MPV 12.7 (H) 6.0 - 12.0 fL    Platelets 257 140 - 450 10*3/mm3    Neutrophil % 88.8 (H) 42.7 - 76.0 %    Lymphocyte % 3.7 (L) 19.6 - 45.3 %    Monocyte % 5.8 5.0 - 12.0 %    Eosinophil % 0.2 (L) 0.3 - 6.2 %     Basophil % 0.3 0.0 - 1.5 %    Immature Grans % 1.2 (H) 0.0 - 0.5 %    Neutrophils, Absolute 27.51 (H) 1.70 - 7.00 10*3/mm3    Lymphocytes, Absolute 1.14 0.70 - 3.10 10*3/mm3    Monocytes, Absolute 1.78 (H) 0.10 - 0.90 10*3/mm3    Eosinophils, Absolute 0.05 0.00 - 0.40 10*3/mm3    Basophils, Absolute 0.08 0.00 - 0.20 10*3/mm3    Immature Grans, Absolute 0.36 (H) 0.00 - 0.05 10*3/mm3    nRBC 0.1 0.0 - 0.2 /100 WBC   Beta Hydroxybutyrate Quantitative    Collection Time: 10/08/19  8:08 PM   Result Value Ref Range    Beta-Hydroxybutyrate Quant 2.935 (H) 0.020 - 0.270 mmol/L   Lactic Acid, Plasma    Collection Time: 10/08/19  8:08 PM   Result Value Ref Range    Lactate 1.8 0.5 - 2.0 mmol/L   Magnesium    Collection Time: 10/08/19  8:08 PM   Result Value Ref Range    Magnesium 1.7 1.6 - 2.6 mg/dL   Phosphorus    Collection Time: 10/08/19  8:08 PM   Result Value Ref Range    Phosphorus 3.3 2.5 - 4.5 mg/dL   Scan Slide    Collection Time: 10/08/19  8:08 PM   Result Value Ref Range    RBC Morphology Normal Normal    WBC Morphology Normal Normal    Platelet Morphology Normal Normal   Hemoglobin A1c    Collection Time: 10/08/19  8:08 PM   Result Value Ref Range    Hemoglobin A1C 14.10 (H) 4.80 - 5.60 %   Troponin    Collection Time: 10/08/19  8:08 PM   Result Value Ref Range    Troponin T 0.042 (C) 0.000 - 0.030 ng/mL   POC Glucose Once    Collection Time: 10/08/19  9:59 PM   Result Value Ref Range    Glucose 405 (C) 70 - 130 mg/dL   Blood Gas, Venous With Co-Ox    Collection Time: 10/08/19 10:36 PM   Result Value Ref Range    Site Nurse/Dr Draw     pH, Venous 7.427 pH Units    pCO2, Venous 35.4 (L) 41.0 - 51.0 mm Hg    pO2, Venous 50.0 27.0 - 53.0 mm Hg    HCO3, Venous 23.3 22.0 - 28.0 mmol/L    Base Excess, Venous -0.7 -2.0 - 2.0 mmol/L    Hemoglobin, Blood Gas 11.1 (L) 13.5 - 17.5 g/dL    Oxyhemoglobin Venous 83.9 %    Methemoglobin Venous 0.9 %    Carboxyhemoglobin Venous 1.6 %    CO2 Content 24.4 22 - 33 mmol/L     Temperature 37.0 C    Barometric Pressure for Blood Gas      Modality Room Air     FIO2 21 %    Ventilator Mode       Note       Note: In addition to lab results from this visit, the labs listed above may include labs taken at another facility or during a different encounter within the last 24 hours. Please correlate lab times with ED admission and discharge times for further clarification of the services performed during this visit.    CT Abdomen Pelvis With Contrast   ED Interpretation   1.  Small mixed attenuation right perinephric fluid collection along the inferior capsule, not present on the prior exam. Perinephric hematoma is a consideration. Correlate clinically for any history of blunt flank trauma. While abscess is possible, this   is unlikely as right renal parenchyma enhances normally with no evidence of pyelonephritis. Laboratory correlation recommended.   2.  Advanced hepatic cirrhosis. Splenomegaly.   3.  Urinary bladder wall thickening. Gas within the bladder is most likely iatrogenic.   4.  Benign left adrenal myelolipoma.      Signer Name: Fadi Healy MD    Signed: 10/8/2019 10:02 PM    [email or web address]      Final Result   1.  Small mixed attenuation right perinephric fluid collection along the inferior capsule, not present on the prior exam. Perinephric hematoma is a consideration. Correlate clinically for any history of blunt flank trauma. While abscess is possible, this   is unlikely as right renal parenchyma enhances normally with no evidence of pyelonephritis. Laboratory correlation recommended.   2.  Advanced hepatic cirrhosis. Splenomegaly.   3.  Urinary bladder wall thickening. Gas within the bladder is most likely iatrogenic.   4.  Benign left adrenal myelolipoma.      Signer Name: Fadi Healy MD    Signed: 10/8/2019 10:02 PM    Workstation Name: [facility]     Radiology [facility]      XR Chest PA &  "Lateral   Final Result   Negative chest.      Signer Name: Fadi Healy MD    Signed: 10/8/2019 10:10 PM    Workstation Name: [email or web address]     [facility]        Vitals:    10/08/19 1812 10/08/19 1819 10/08/19 2030 10/08/19 2150   BP:  122/72 111/75 153/85   Pulse:  102 98 98   Resp:  18 20 18   Temp:  98.7 °F (37.1 °C)     TempSrc:  Oral     SpO2:  95% 96% 94%   Weight: 111 kg (245 lb)      Height: 190.5 cm (75\")        Medications   sodium chloride 0.9 % flush 10 mL (not administered)   sodium chloride 0.9 % bolus 2,000 mL (2,000 mL Intravenous New Bag 10/8/19 2148)   sodium chloride 0.9 % with KCl 20 mEq/L infusion (not administered)   dextrose 5 % and sodium chloride 0.45 % with KCl 20 mEq/L infusion (not administered)   insulin regular 1 unit/mL in 0.9% sodium chloride (11 Units/hr Intravenous New Bag 10/8/19 2214)   dextrose (D50W) 25 g/ 50mL Intravenous Solution 12.5 g (not administered)   sodium chloride 0.9 % flush 10 mL (not administered)   sodium chloride 0.9 % infusion (not administered)   vancomycin 1250 mg/250 mL 0.9% NS IVPB ([facility]) (not administered)   piperacillin-tazobactam (ZOSYN) 3.375 g in iso-osmotic dextrose 50 ml (premix) (not administered)   sodium chloride 0.9 % bolus 1,000 mL (0 mL Intravenous Stopped 10/8/19 2100)   iopamidol (ISOVUE-300) 61 % injection 100 mL (95 mL Intravenous Given 10/8/19 2124)     ECG/EMG Results (last 24 hours)     ** No results found for the last 24 hours. **        No orders to display                       MDM  Number of Diagnoses or Management Options  Cirrhosis of liver without ascites, unspecified hepatic cirrhosis type (CMS/HCC): established and worsening  Dehydration: new and requires workup  Essential hypertension: established and worsening  History of MRSA infection: established and worsening  Perinephric abscess: new and requires workup  Sepsis, due to unspecified organism, unspecified whether acute organ dysfunction present " (CMS/HCC): new and requires workup  Uncontrolled type 2 diabetes mellitus with hyperglycemia (CMS/HCC): new and requires workup     Amount and/or Complexity of Data Reviewed  Clinical lab tests: reviewed and ordered  Tests in the radiology section of CPT®: reviewed and ordered  Tests in the medicine section of CPT®: ordered and reviewed  Decide to obtain previous medical records or to obtain history from someone other than the patient: yes  Independent visualization of images, tracings, or specimens: yes    Risk of Complications, Morbidity, and/or Mortality  Presenting problems: high  Diagnostic procedures: high  Management options: high        Final diagnoses:   Perinephric abscess   Sepsis, due to unspecified organism, unspecified whether acute organ dysfunction present (CMS/HCC)   Dehydration   Uncontrolled type 2 diabetes mellitus with hyperglycemia (CMS/HCC)   Essential hypertension   History of MRSA infection   Cirrhosis of liver without ascites, unspecified hepatic cirrhosis type (CMS/HCC)       Documentation assistance provided by andrea Moraes.  Information recorded by the scribe was done at my direction and has been verified and validated by me.     Sarahi Moraes  10/08/19 1954       Chang Can PA-C  10/08/19 [id number]

## 2019-10-09 ENCOUNTER — APPOINTMENT (OUTPATIENT)
Dept: CT IMAGING | Facility: HOSPITAL | Age: 49
End: 2019-10-09

## 2019-10-09 PROBLEM — B96.1 BACTEREMIA DUE TO KLEBSIELLA PNEUMONIAE: Status: ACTIVE | Noted: 2019-10-09

## 2019-10-09 PROBLEM — R78.81 BACTEREMIA DUE TO KLEBSIELLA PNEUMONIAE: Status: ACTIVE | Noted: 2019-10-09

## 2019-10-09 LAB
ANION GAP SERPL CALCULATED.3IONS-SCNC: 15 MMOL/L (ref 5–15)
APTT PPP: 30.3 SECONDS (ref 24–37)
BACTERIA BLD CULT: ABNORMAL
BUN BLD-MCNC: 29 MG/DL (ref 6–20)
BUN/CREAT SERPL: 38.7 (ref 7–25)
CALCIUM SPEC-SCNC: 8.4 MG/DL (ref 8.6–10.5)
CHLORIDE SERPL-SCNC: 88 MMOL/L (ref 98–107)
CO2 SERPL-SCNC: 22 MMOL/L (ref 22–29)
CREAT BLD-MCNC: 0.75 MG/DL (ref 0.76–1.27)
DEPRECATED RDW RBC AUTO: 43.4 FL (ref 37–54)
ERYTHROCYTE [DISTWIDTH] IN BLOOD BY AUTOMATED COUNT: 14.2 % (ref 12.3–15.4)
GFR SERPL CREATININE-BSD FRML MDRD: 110 ML/MIN/1.73
GLUCOSE BLD-MCNC: 341 MG/DL (ref 65–99)
GLUCOSE BLDC GLUCOMTR-MCNC: 283 MG/DL (ref 70–130)
GLUCOSE BLDC GLUCOMTR-MCNC: 306 MG/DL (ref 70–130)
GLUCOSE BLDC GLUCOMTR-MCNC: 345 MG/DL (ref 70–130)
GLUCOSE BLDC GLUCOMTR-MCNC: 355 MG/DL (ref 70–130)
HCT VFR BLD AUTO: 31.2 % (ref 37.5–51)
HGB BLD-MCNC: 10.2 G/DL (ref 13–17.7)
INR PPP: 1.17 (ref 0.85–1.16)
LDH SERPL-CCNC: 220 U/L (ref 135–225)
MCH RBC QN AUTO: 27.3 PG (ref 26.6–33)
MCHC RBC AUTO-ENTMCNC: 32.7 G/DL (ref 31.5–35.7)
MCV RBC AUTO: 83.6 FL (ref 79–97)
PLATELET # BLD AUTO: 235 10*3/MM3 (ref 140–450)
PMV BLD AUTO: 12.7 FL (ref 6–12)
POTASSIUM BLD-SCNC: 3.6 MMOL/L (ref 3.5–5.2)
PROCALCITONIN SERPL-MCNC: 1.6 NG/ML (ref 0.1–0.25)
PROTHROMBIN TIME: 14.3 SECONDS (ref 11.2–14.3)
RBC # BLD AUTO: 3.73 10*6/MM3 (ref 4.14–5.8)
SODIUM BLD-SCNC: 125 MMOL/L (ref 136–145)
TROPONIN T SERPL-MCNC: 0.03 NG/ML (ref 0–0.03)
WBC NRBC COR # BLD: 29.99 10*3/MM3 (ref 3.4–10.8)

## 2019-10-09 PROCEDURE — 85730 THROMBOPLASTIN TIME PARTIAL: CPT | Performed by: RADIOLOGY

## 2019-10-09 PROCEDURE — 63710000001 INSULIN DETEMIR PER 5 UNITS: Performed by: PHYSICIAN ASSISTANT

## 2019-10-09 PROCEDURE — 25010000002 MEROPENEM PER 100 MG: Performed by: INTERNAL MEDICINE

## 2019-10-09 PROCEDURE — 84484 ASSAY OF TROPONIN QUANT: CPT | Performed by: PHYSICIAN ASSISTANT

## 2019-10-09 PROCEDURE — 87186 SC STD MICRODIL/AGAR DIL: CPT | Performed by: UROLOGY

## 2019-10-09 PROCEDURE — 87102 FUNGUS ISOLATION CULTURE: CPT | Performed by: UROLOGY

## 2019-10-09 PROCEDURE — 99233 SBSQ HOSP IP/OBS HIGH 50: CPT | Performed by: INTERNAL MEDICINE

## 2019-10-09 PROCEDURE — 80048 BASIC METABOLIC PNL TOTAL CA: CPT | Performed by: PHYSICIAN ASSISTANT

## 2019-10-09 PROCEDURE — 85027 COMPLETE CBC AUTOMATED: CPT | Performed by: PHYSICIAN ASSISTANT

## 2019-10-09 PROCEDURE — 87015 SPECIMEN INFECT AGNT CONCNTJ: CPT | Performed by: UROLOGY

## 2019-10-09 PROCEDURE — 85610 PROTHROMBIN TIME: CPT | Performed by: RADIOLOGY

## 2019-10-09 PROCEDURE — 83615 LACTATE (LD) (LDH) ENZYME: CPT | Performed by: INTERNAL MEDICINE

## 2019-10-09 PROCEDURE — 87075 CULTR BACTERIA EXCEPT BLOOD: CPT | Performed by: UROLOGY

## 2019-10-09 PROCEDURE — 87205 SMEAR GRAM STAIN: CPT | Performed by: UROLOGY

## 2019-10-09 PROCEDURE — 49406 IMAGE CATH FLUID PERI/RETRO: CPT

## 2019-10-09 PROCEDURE — 87070 CULTURE OTHR SPECIMN AEROBIC: CPT | Performed by: UROLOGY

## 2019-10-09 PROCEDURE — 25010000002 PIPERACILLIN SOD-TAZOBACTAM PER 1 G: Performed by: PHYSICIAN ASSISTANT

## 2019-10-09 PROCEDURE — 75989 ABSCESS DRAINAGE UNDER X-RAY: CPT

## 2019-10-09 PROCEDURE — 63710000001 INSULIN LISPRO (HUMAN) PER 5 UNITS: Performed by: PHYSICIAN ASSISTANT

## 2019-10-09 PROCEDURE — 82962 GLUCOSE BLOOD TEST: CPT

## 2019-10-09 PROCEDURE — 84145 PROCALCITONIN (PCT): CPT | Performed by: INTERNAL MEDICINE

## 2019-10-09 RX ORDER — LIDOCAINE HYDROCHLORIDE 10 MG/ML
20 INJECTION, SOLUTION EPIDURAL; INFILTRATION; INTRACAUDAL; PERINEURAL ONCE
Status: COMPLETED | OUTPATIENT
Start: 2019-10-09 | End: 2019-10-09

## 2019-10-09 RX ORDER — ONDANSETRON 2 MG/ML
4 INJECTION INTRAMUSCULAR; INTRAVENOUS EVERY 6 HOURS PRN
Status: DISCONTINUED | OUTPATIENT
Start: 2019-10-09 | End: 2019-10-23 | Stop reason: HOSPADM

## 2019-10-09 RX ORDER — SEVELAMER CARBONATE FOR ORAL SUSPENSION 800 MG/1
800 POWDER, FOR SUSPENSION ORAL
Status: DISCONTINUED | OUTPATIENT
Start: 2019-10-09 | End: 2019-10-11

## 2019-10-09 RX ORDER — SODIUM CHLORIDE 0.9 % (FLUSH) 0.9 %
10 SYRINGE (ML) INJECTION AS NEEDED
Status: DISCONTINUED | OUTPATIENT
Start: 2019-10-09 | End: 2019-10-23 | Stop reason: HOSPADM

## 2019-10-09 RX ORDER — LORAZEPAM 1 MG/1
1 TABLET ORAL EVERY 6 HOURS PRN
Status: DISPENSED | OUTPATIENT
Start: 2019-10-09 | End: 2019-10-19

## 2019-10-09 RX ORDER — METOPROLOL TARTRATE 100 MG/1
100 TABLET ORAL EVERY 12 HOURS SCHEDULED
Status: DISCONTINUED | OUTPATIENT
Start: 2019-10-09 | End: 2019-10-23 | Stop reason: HOSPADM

## 2019-10-09 RX ORDER — TERAZOSIN 5 MG/1
5 CAPSULE ORAL NIGHTLY
Status: DISCONTINUED | OUTPATIENT
Start: 2019-10-09 | End: 2019-10-23 | Stop reason: HOSPADM

## 2019-10-09 RX ORDER — SODIUM CHLORIDE 0.9 % (FLUSH) 0.9 %
10 SYRINGE (ML) INJECTION EVERY 12 HOURS SCHEDULED
Status: DISCONTINUED | OUTPATIENT
Start: 2019-10-09 | End: 2019-10-23 | Stop reason: HOSPADM

## 2019-10-09 RX ORDER — AMLODIPINE BESYLATE 10 MG/1
10 TABLET ORAL
Status: DISCONTINUED | OUTPATIENT
Start: 2019-10-09 | End: 2019-10-23 | Stop reason: HOSPADM

## 2019-10-09 RX ORDER — HYDROCODONE BITARTRATE AND ACETAMINOPHEN 10; 325 MG/1; MG/1
1 TABLET ORAL 3 TIMES DAILY PRN
Status: DISCONTINUED | OUTPATIENT
Start: 2019-10-09 | End: 2019-10-23 | Stop reason: HOSPADM

## 2019-10-09 RX ORDER — ACETAMINOPHEN 325 MG/1
650 TABLET ORAL EVERY 4 HOURS PRN
Status: DISCONTINUED | OUTPATIENT
Start: 2019-10-09 | End: 2019-10-23 | Stop reason: HOSPADM

## 2019-10-09 RX ORDER — ACETAMINOPHEN 650 MG/1
650 SUPPOSITORY RECTAL EVERY 4 HOURS PRN
Status: DISCONTINUED | OUTPATIENT
Start: 2019-10-09 | End: 2019-10-23 | Stop reason: HOSPADM

## 2019-10-09 RX ORDER — DEXTROSE MONOHYDRATE 25 G/50ML
25 INJECTION, SOLUTION INTRAVENOUS
Status: DISCONTINUED | OUTPATIENT
Start: 2019-10-09 | End: 2019-10-23 | Stop reason: HOSPADM

## 2019-10-09 RX ORDER — NICOTINE POLACRILEX 4 MG
15 LOZENGE BUCCAL
Status: DISCONTINUED | OUTPATIENT
Start: 2019-10-09 | End: 2019-10-23 | Stop reason: HOSPADM

## 2019-10-09 RX ORDER — DULOXETIN HYDROCHLORIDE 30 MG/1
30 CAPSULE, DELAYED RELEASE ORAL DAILY
Status: DISCONTINUED | OUTPATIENT
Start: 2019-10-09 | End: 2019-10-23 | Stop reason: HOSPADM

## 2019-10-09 RX ORDER — CHOLECALCIFEROL (VITAMIN D3) 125 MCG
5 CAPSULE ORAL NIGHTLY PRN
Status: DISCONTINUED | OUTPATIENT
Start: 2019-10-09 | End: 2019-10-23 | Stop reason: HOSPADM

## 2019-10-09 RX ORDER — ACETAMINOPHEN 160 MG/5ML
650 SOLUTION ORAL EVERY 4 HOURS PRN
Status: DISCONTINUED | OUTPATIENT
Start: 2019-10-09 | End: 2019-10-23 | Stop reason: HOSPADM

## 2019-10-09 RX ORDER — SODIUM CHLORIDE 9 MG/ML
100 INJECTION, SOLUTION INTRAVENOUS CONTINUOUS
Status: DISCONTINUED | OUTPATIENT
Start: 2019-10-09 | End: 2019-10-22

## 2019-10-09 RX ADMIN — INSULIN LISPRO 7 UNITS: 100 INJECTION, SOLUTION INTRAVENOUS; SUBCUTANEOUS at 18:15

## 2019-10-09 RX ADMIN — LIDOCAINE HYDROCHLORIDE 20 ML: 10 INJECTION, SOLUTION EPIDURAL; INFILTRATION; INTRACAUDAL; PERINEURAL at 15:04

## 2019-10-09 RX ADMIN — SODIUM CHLORIDE 100 ML/HR: 9 INJECTION, SOLUTION INTRAVENOUS at 01:33

## 2019-10-09 RX ADMIN — PIPERACILLIN SODIUM AND TAZOBACTAM SODIUM 3.38 G: 3; .375 INJECTION, POWDER, LYOPHILIZED, FOR SOLUTION INTRAVENOUS at 05:02

## 2019-10-09 RX ADMIN — TERAZOSIN HYDROCHLORIDE ANHYDROUS 5 MG: 5 CAPSULE ORAL at 21:43

## 2019-10-09 RX ADMIN — INSULIN DETEMIR 20 UNITS: 100 INJECTION, SOLUTION SUBCUTANEOUS at 10:20

## 2019-10-09 RX ADMIN — INSULIN LISPRO 7 UNITS: 100 INJECTION, SOLUTION INTRAVENOUS; SUBCUTANEOUS at 12:22

## 2019-10-09 RX ADMIN — METOPROLOL TARTRATE 100 MG: 100 TABLET ORAL at 10:21

## 2019-10-09 RX ADMIN — ACETAMINOPHEN 650 MG: 325 TABLET ORAL at 23:14

## 2019-10-09 RX ADMIN — MEROPENEM 1 G: 1 INJECTION INTRAVENOUS at 17:35

## 2019-10-09 RX ADMIN — METOPROLOL TARTRATE 100 MG: 100 TABLET ORAL at 21:43

## 2019-10-09 RX ADMIN — AMLODIPINE BESYLATE 10 MG: 10 TABLET ORAL at 10:20

## 2019-10-09 RX ADMIN — DULOXETINE HYDROCHLORIDE 30 MG: 30 CAPSULE, DELAYED RELEASE ORAL at 10:21

## 2019-10-09 RX ADMIN — INSULIN DETEMIR 20 UNITS: 100 INJECTION, SOLUTION SUBCUTANEOUS at 21:30

## 2019-10-09 RX ADMIN — SODIUM CHLORIDE, PRESERVATIVE FREE 10 ML: 5 INJECTION INTRAVENOUS at 22:59

## 2019-10-09 RX ADMIN — MEROPENEM 1 G: 1 INJECTION INTRAVENOUS at 12:21

## 2019-10-09 RX ADMIN — INSULIN LISPRO 6 UNITS: 100 INJECTION, SOLUTION INTRAVENOUS; SUBCUTANEOUS at 21:44

## 2019-10-09 RX ADMIN — INSULIN LISPRO 8 UNITS: 100 INJECTION, SOLUTION INTRAVENOUS; SUBCUTANEOUS at 10:21

## 2019-10-09 NOTE — CONSULTS
Kendrick Crystal  1970  [id number]    Date of Consult: 10/9/2019    Admit Date:  10/8/2019      Requesting Provider: No ref. provider found  Evaluating Physician: Usman Dong MD    Chief Complaint: urinary retention, fatigue, dysuria    Reason for Consultation: septicemia    History of present illness:    Patient is a 49 y.o.  Yr old male with history of poorly contolled T2DM, DUNLAP/liver cirrhosis, HTN, PVD/Left BKA, and multiple skin abscesses/MRSA who presented to [facility] ED with right shin wound infection summer 2018.  He was unable to get to see Dr. Martinez as his father passed away unexpectedly on 18 and he had to wait until after the .  The wound worsened becoming gangrenous and he came to [facility] ED in 2018.  He also had been hospitalized at [facility] and then transferred to  for a severe penis/scrotum infection requiring surgical debridement in summer 2018.  He received antibiotics regarding his right leg infection, generally improved over the remainder of summer 2018 but that area had not completely healed. He was admitted 2019 with acute left lower abdominal wall redness/swelling and pain, spontaneous drainage associated with fever/chills and uncontrolled blood sugars.   I&D requiring wide debridement , severe/deep infection and transferred to [facility] on May 3 with IV Zosyn/oral doxycycline. Cultures had lactobacillus and mixed gram-positive skin sherly including alpha strep, staph epidermidis and corynebacterium. Readmitted to [facility] May 18, 2019, increasing generalized edema ultimately transitioned to oral doxycycline and healed.     He presented to the emergency room 2019 with acute rectal pain that had begun ; this is associated with constipation for days, chills and nausea/dry heaving.  White blood cell count elevated, high hemoglobin A1c over 13, urinalysis with hematuria/pyuria and CT scan with 3 x 3 cm  "fluid collection anterior to the distal rectum and per discussion with Dr. Akbar/Jennifer, this is not in the prostate.  Colorectal surgery/urology saw him for consideration of surgical options/timing/threshold, etc..     8/2/19 I&Dper Dr Payne perirectal abscess; patient reports that he had instrumented his rectum prior to hospitalization with enema     8/3/19 urinary retention overnight requiring in/out catheterization per patient.  Creat climb     8/4/19 further creat climb; childs placed and lisinopril stopped and d/w Dr Rubio;  ?obstruction initially associated with retention postop (1200 out with I/O cath postop per nursing) -v- contrast from CT -v- lisinopril/medication/vancomycin -v- relative hypotension -v- likely combination of factors with ATN; nephrology following; vancomycin trough high and vancomycin stopped empirically 8/3 although high trough potentially accumulation as a consequence of diminishing renal function associated with retention/contrast/pressure rather than cause.  Nonetheless, avoid for now; creatinine trending down.        8/7 in retrospect he remembers air in his urine at admit which has raised concern for possible fistula from urinary tract;  No fecaluria and culture from abscess is fecal sherly;  ?rectal-urethral fistula;  Dr Payne aware     Culture with E. coli/Klebsiella species and creatinine generally trended down, transitioned to oral antibiotics at discharge.     Readmitted August 17, 2019 with nausea/vomiting/dry heaves for 3 days.  Childs catheter was placed and CT scan of the abdomen showed:     \"Previously seen fluid collection identified inferior to the prostate gland is more increased in density on today's examination. There is wall thickening again seen throughout the bladder. Findings again are concerning for cystitis.\" per radiology    He was transitioned to oral omnicef/topical antifungal on approx 8/23/19; Noncompliant with f/u in our office    READMITTED 10/8/19 RLQ abd " pain, urinary retention, nausea, dysuria and generalized fatigue    UTI by U/A, subsequent blood cultures positive for GNR and initial PCR with kleb sp, no carbapenem resistance by initial PCR per my d/w micro;  Abnormal CT abd with right perinephric fluid collection, advanced cirrhosis, urinary bladder thickening.    He currently denies abd pain;  Walker in and no overt hematuria or pyuria;  He denied any air in urine or fecaluria prior to admit to me.    No headache photophobia or neck stiffness.  No shortness of breath cough or hemoptysis.  No diarrhea.  No hematochezia melena or hematemesis.  No skin rash.       Past Medical History:   Diagnosis Date   • Abdominal wall cellulitis 5/20/2019   • JUAN (acute kidney injury) (CMS/HCC) 7/29/2018   • Arthritis    • Bipolar 1 disorder (CMS/HCC)    • Cellulitis of right anterior lower leg 7/29/2018   • Cirrhosis (CMS/HCC)    • Counseling for insulin pump    • Depression    • Diabetes mellitus (CMS/HCC)    • Encephalopathy, hepatic (CMS/HCC)    • H/O degenerative disc disease    • History of Lissa's gangrene     right leg/testicle   • Hyperlipemia    • Hypertension    • multiple Skin abscesses    • DUNLAP, bx showed stage IV fibrosis    • Neuropathy    • Peripheral vascular disease (CMS/HCC)    • Thrombophlebitis        Past Surgical History:   Procedure Laterality Date   • ABDOMINAL WALL ABSCESS INCISION AND DRAINAGE N/A 4/26/2019    Procedure: ABDOMINAL WALL DEBRIDEMENT;  Surgeon: Fadi Kern MD;  Location:  [facility];  Service: General   • AMPUTATION DIGIT Left 1/30/2017    Procedure: left fourth and fifth transmetatarsal toe amputation ;  Surgeon: Juancho Martinez MD;  Location:  [facility];  Service:    • BELOW KNEE AMPUTATION Left 3/2/2017    Procedure: AMPUTATION BELOW KNEE, SHMUEL;  Surgeon: Juancho Martinez MD;  Location:  [facility];  Service:    • ENDOSCOPY     • HEMORRHOIDECTOMY N/A 8/2/2019    Procedure: EXCISION AND DRAIN PERIRECTAL ABSCESS;  Surgeon: Elmer  Mumtaz PAIZ MD;  Location: [facility];  Service: General   • LEG SURGERY     • TESTICLE SURGERY Right     DEBRIDEMENT FROM GANGRENE   • TONSILLECTOMY  1975       Pediatric History   Patient Guardian Status   • Not on file     Other Topics Concern   • Not on file   Social History Narrative    L[facility] in [facility]       family history includes Arthritis in his father and mother; Diabetes in his brother and father; Heart attack in his father; Hyperlipidemia in his father; Hypertension in his brother, father, and mother; Kidney disease in his mother; Mental illness in his sister; Obesity in his brother; Stroke in his mother.    No Known Allergies    Medication:  Current Facility-Administered Medications   Medication Dose Route Frequency Provider Last Rate Last Dose   • acetaminophen (TYLENOL) tablet 650 mg  650 mg Oral Q4H PRN Yasmany Martinez PA-C        Or   • acetaminophen (TYLENOL) 160 MG/5ML solution 650 mg  650 mg Oral Q4H PRN Yasmany Martinez PA-C        Or   • acetaminophen (TYLENOL) suppository 650 mg  650 mg Rectal Q4H PRN Yasmany Martinez PA-C       • amLODIPine (NORVASC) tablet 10 mg  10 mg Oral Q24H Yasmany Martinez PA-C   10 mg at 10/09/19 1020   • dextrose (D50W) 25 g/ 50mL Intravenous Solution 25 g  25 g Intravenous Q15 Min PRN Yasmany Martinez PA-C       • dextrose (GLUTOSE) oral gel 15 g  15 g Oral Q15 Min PRN Yasmany Martinez PA-C       • DULoxetine (CYMBALTA) DR capsule 30 mg  30 mg Oral Daily Yasmany Martinez PA-C   30 mg at 10/09/19 1021   • glucagon (human recombinant) (GLUCAGEN DIAGNOSTIC) injection 1 mg  1 mg Subcutaneous Q15 Min PRN Yasmany Martinez PA-C       • HYDROcodone-acetaminophen (NORCO)  MG per tablet 1 tablet  1 tablet Oral TID PRN Margo Tafoya, DO       • insulin detemir (LEVEMIR) injection 20 Units  20 Units Subcutaneous Q12H Yasmany Martinez PA-C   20 Units at 10/09/19 1020   • insulin lispro (humaLOG) injection 0-9 Units  0-9 Units Subcutaneous 4x  Daily With Meals & Nightly Yasmany Martinez PA-C   8 Units at 10/09/19 1021   • LORazepam (ATIVAN) tablet 1 mg  1 mg Oral Q6H PRN Latha Lenz MD       • melatonin tablet 5 mg  5 mg Oral Nightly PRN Yasmany Martinez PA-C       • meropenem (MERREM) 1 g/100 mL 0.9% NS VTB (mbp)  1 g Intravenous Once Usman Dong MD       • meropenem (MERREM) 1 g/100 mL 0.9% NS VTB (mbp)  1 g Intravenous Q8H Usman Dong MD       • metoprolol tartrate (LOPRESSOR) tablet 100 mg  100 mg Oral Q12H Yasmany Martinez PA-KEILA   100 mg at 10/09/19 1021   • ondansetron (ZOFRAN) injection 4 mg  4 mg Intravenous Q6H PRN Yasmany Martinez PA-KEILA       • sevelamer (RENVELA) packet 0.8 g  800 mg Oral TID With Meals Yasmany Martinez PA-C       • sodium chloride 0.9 % flush 10 mL  10 mL Intravenous Q12H Yasmany Martinez PA-KEILA       • sodium chloride 0.9 % flush 10 mL  10 mL Intravenous PRN Yasmany Martinez PA-KEILA       • sodium chloride 0.9 % infusion  100 mL/hr Intravenous Continuous Yasmany Martinez PA-C 100 mL/hr at 10/09/19 0509 100 mL/hr at 10/09/19 0509   • terazosin (HYTRIN) capsule 5 mg  5 mg Oral Nightly Yasmany Martinez PA-C           Antibiotics:  Anti-Infectives (From admission, onward)    Ordered     Dose/Rate Route Frequency Start Stop    10/09/19 1136  meropenem (MERREM) 1 g/100 mL 0.9% NS VTB (mbp)     Ordering Provider:  Usman Dong MD    1 g  over 3 Hours Intravenous Every 8 Hours 10/09/19 1830 10/19/19 1829    10/09/19 1136  meropenem (MERREM) 1 g/100 mL 0.9% NS VTB (mbp)     Ordering Provider:  Usman Dong MD    1 g  over 30 Minutes Intravenous Once 10/09/19 1230      10/08/19 2338  piperacillin-tazobactam (ZOSYN) 3.375 g in iso-osmotic dextrose 50 ml (premix)     Ordering Provider:  Yasmany Martinez PA-C    3.375 g  over 30 Minutes Intravenous Once 10/08/19 2340 10/09/19 0016            Review of Systems    No f/c/s. No n/v/d.  No new ADR to Abx.         Constitutional-- No Fever,  "chills or sweats.  Appetite diminished with fatigue  Heent-- No new vision, hearing or throat complaints.  No epistaxis or oral sores.  Denies odynophagia or dysphagia.  No flashers, floaters or eye pain. No odynophagia or dysphagia. No headache, photophobia or neck stiffness.  CV-- No chest pain, palpitation or syncope  Resp-- No SOB/cough/Hemoptysis  GI- No  diarrhea.  No hematochezia, melena, or hematemesis. Denies jaundice or chronic liver disease.  -- No dysuria, hematuria, or flank pain.  Denies hesitancy, urgency or flank pain.  Lymph- no swollen lymph nodes in neck/axilla or groin.   Heme- No active bruising or bleeding; no Hx of DVT or PE.  MS-- no swelling or pain in the bones or joints of arms/legs.  No new back pain.  Neuro-- No acute focal weakness or numbness in the arms or legs.  No seizures.     Full 12 point review of systems reviewed and negative otherwise for acute complaints, except for above    Physical Exam:   Vital Signs   /89   Pulse 94   Temp 98.3 °F (36.8 °C) (Oral)   Resp 18   Ht 190.5 cm (75\")   Wt 110 kg (243 lb)   SpO2 95%   BMI 30.37 kg/m²      GENERAL: awake, in no acute distress.   HEENT: Normocephalic, atraumatic.  PERRL. EOMI. No conjunctival injection. No icterus. Oropharynx clear without evidence of thrush or exudate. No evidence of peridontal disease.    NECK: Supple without nuchal rigidity. No mass.  LYMPH: No cervical, axillary or inguinal lymphadenopathy.  HEART: RRR; No murmur, rubs, gallops.   LUNGS: Diminished at bases bilaterally without wheezing, rales, rhonchi. Normal respiratory effort. Nonlabored. No dullness.  ABDOMEN: Soft, nontender, nondistended. Positive bowel sounds. No rebound or guarding. NO mass or HSM.  EXT:  No cyanosis, clubbing or edema. No cord.  : +childs    MSK: FROM without joint effusions noted arms/legs.    SKIN: Warm and dry without cutaneous eruptions on Inspection/palpation.    NEURO: awake     Amputee status noted with left " BKA    No CVA tenderness     No peripheral stigmata/phenomena of endocarditis    Laboratory Data    Results from last 7 days   Lab Units 10/09/19  0331 10/08/19  2008   WBC 10*3/mm3 29.99* 30.92*   HEMOGLOBIN g/dL 10.2* 11.4*   HEMATOCRIT % 31.2* 34.6*   PLATELETS 10*3/mm3 235 257     Results from last 7 days   Lab Units 10/09/19  0331   SODIUM mmol/L 125*   POTASSIUM mmol/L 3.6   CHLORIDE mmol/L 88*   CO2 mmol/L 22.0   BUN mg/dL 29*   CREATININE mg/dL 0.75*   GLUCOSE mg/dL 341*   CALCIUM mg/dL 8.4*     Results from last 7 days   Lab Units 10/08/19  2008   ALK PHOS U/L 207*   BILIRUBIN mg/dL 0.4   ALT (SGPT) U/L 10   AST (SGOT) U/L 9               Estimated Creatinine Clearance: 156.1 mL/min (A) (by C-G formula based on SCr of 0.75 mg/dL (L)).      Microbiology:      Radiology:  Imaging Results (last 72 hours)     Procedure Component Value Units Date/Time    CT Abdomen Pelvis With Contrast [[id number]] Collected:  10/08/19 2202     Updated:  10/08/19 2222    Narrative:       CT ABDOMEN AND PELVIS WITH CONTRAST, 10/8/2019    HISTORY:  49-year-old male in the ED complaining of 2 day history right side abdomen pain, nausea, vomiting and generalized weakness. History of hepatic cirrhosis (DUNLAP). History of diabetes with left BKA and multiple abdominal wall abscesses and lower extremity  cellulitis in 2018.    TECHNIQUE:  CT imaging of the abdomen and pelvis with IV contrast. Radiation dose reduction techniques included automated exposure control. Radiation audit for CT and nuclear cardiology exams in the last 12 months: 7.    COMPARISON:  *  CT abdomen/pelvis, 8/18/2019.    ABDOMEN FINDINGS:  Images show a right lower perinephric fluid collection that is either subcapsular or pericapsular. This extends along the lower kidney for a length of about 8.5 cm and has a maximum thickness of 2.5 cm. The fluid is mixed attenuation and may be  hemorrhagic. Correlate for any recent history of blunt trauma to the flank. Renal  parenchyma shows no disruption or abnormal enhancement. There is no fracture of overlying right lower posterior ribs or transverse spinous processes. Given the history,  perinephric abscess is possible but is significantly less likely given normal renal parenchymal enhancement. No evidence of urinary obstruction. No visible nephrolithiasis.    Advanced hepatic cirrhosis. Mild splenomegaly. No suspicious liver lesion. No upper abdominal ascites. Hepatic vasculature appears patent. No gallbladder distention or bile duct dilatation. Negative pancreas.    Benign left adrenal myelolipoma measuring about 2.9 cm. Normal caliber abdominal aorta. Small bowel and colon are normal in caliber and appearance, as imaged. The appendix is normal. No gastric distention or distal esophageal dilatation.    PELVIS FINDINGS:  Air within the urinary bladder, likely iatrogenic. There is at least mild diffuse bladder wall thickening. Prostate and rectum are unremarkable. No inguinal hernia.    Lung base images show multiple benign calcified granulomas.      Impression:       1.  Small mixed attenuation right perinephric fluid collection along the inferior capsule, not present on the prior exam. Perinephric hematoma is a consideration. Correlate clinically for any history of blunt flank trauma. While abscess is possible, this  is unlikely as right renal parenchyma enhances normally with no evidence of pyelonephritis. Laboratory correlation recommended.  2.  Advanced hepatic cirrhosis. Splenomegaly.  3.  Urinary bladder wall thickening. Gas within the bladder is most likely iatrogenic.  4.  Benign left adrenal myelolipoma.    Signer Name: Fadi Healy MD   Signed: 10/8/2019 10:02 PM   Workstation Name: [email or web address]    [facility]    XR Chest PA & Lateral [[id number]] Collected:  10/08/19 2210     Updated:  10/08/19 2212    Narrative:       CHEST X-RAY, 10/8/2019      HISTORY:    49-year-old male in the ED with  reported diabetic ketoacidosis.      TECHNIQUE:  AP portable upright chest series.      FINDINGS:  Heart size and pulmonary vascularity are normal. The lungs are expanded and clear. No visible pulmonary infiltrate or pleural effusion. Lower lung benign calcified granulomas.      Impression:       Negative chest.    Signer Name: Fadi Healy MD   Signed: 10/8/2019 10:10 PM   Workstation Name: [email or web address]    [facility]            Impression:      --Acute GNR septicemia/sepsis, likely urinary source/pyelnoephritis associated with urinary retention and  with abnormal CT scan and workup ongoing by urology to help determine hematoma/cyst -v- abscess -v- other;  IV merrem ongoing     --Hx perirectal abscess ; Colorectal surgery, Dr. Payne previously saw.  Drainage as above on August 2 ;  Mixed Fecal sherly in culture; CT scans  with no mention of abscess on 8/18 study;  Dr Payne consulted to see given urology conern for fistula     --History urinary retention.  urology following     --Hx ARF in August 2019;  ?obstruction initially associated with retention postop (1200 out with I/O cath postop per nursing) -v- contrast from CT -v- lisinopril/medication/vancomycin -v- relative hypotension -v- likely combination of factors with ATN;  vancomycin trough high and vancomycin stopped empirically 8/3 although high trough potentially accumulation as a consequence of diminishing renal function associated with retention/contrast/pressure rather than cause.  Nonetheless, avoid for now; likely further acute kidney injury at admission August 17 associated with dehydration/prerenal/ATN etiology     --History MRSA;  Not in current culture     --Diabetes mellitus type 2, uncontrolled.  He reports the reason for this is forgetfulness     --History Johnston and chronic liver disease/cirrhosis  per past notes     --Left BKA     --Peripheral vascular disease    --medical noncompliance          PLAN: Thank you for  asking us to see Kendrick Crystal, I recommend the following:     --IV merrem    --I discussed potential risks and benefits of the prescribed antibiotics that include, but are not limited to, solid organ toxicity, neuro toxicity, renal toxicity, CDiff, cytopenias, hypersensitivity,  etc.. Patient/Family voice understanding and agree to proceed.     --Check/review labs cultures and scans     --Discussed with microbiology     --History per nursing staff     --Discussed with Dr Lenz     --Discussed with family, partial history per them     --Highly complex set of issues with high risk for further serious morbidity and other serious sequela     --Colorectal surgery and urology to follow        Usman Dong MD  10/9/2019

## 2019-10-09 NOTE — CONSULTS
Urology Consult    Date of Admission: 10/8/2019  Date of Consult: 10/09/19    Primary Care Physician: No primary care provider on file.  Referring Physician: Delfin    Chief complaint/Reason for consultation right perinephric fluid collection    Subjective     History of Present Illness Kendrick Crystal is a 49-year-old white male who has multiple medical problems.  He presented to the emergency room yesterday with 3 to 4-day history of anorexia with vomiting and dry heaves and he is been unable to keep anything down orally and then in the last few days is developed severe right-sided abdominal and flank pain with no history of recent trauma.  He denies fever chills or Rigor.  Upon work-up in the emergency room a CT scan of the abdomen reveals a fluid collection around along the posterior and inferior capsule of the right kidney with normal functioning kidney without stones hydronephrosis tumors or other abnormalities.  He does have air seen in his bladder and complains of pneumaturia for the last month or 2 since his recent claudia-rectal abscess drainage by Dr. Payne in August.    Urologically he has a history of urinary retention and saw my partner Dr. Gutierres in consultation earlier this year in April.    Review of Systems he denies any hematuria or dysuria but says that he does have episodic pneumaturia.  Otherwise complete ROS is negative except as mentioned above.    Past Medical History:  has a past medical history of Abdominal wall cellulitis (5/20/2019), JUAN (acute kidney injury) (CMS/HCC) (7/29/2018), Arthritis, Bipolar 1 disorder (CMS/HCC), Cellulitis of right anterior lower leg (7/29/2018), Cirrhosis (CMS/HCC), Counseling for insulin pump, Depression, Diabetes mellitus (CMS/HCC), Encephalopathy, hepatic (CMS/HCC), H/O degenerative disc disease, History of Lissa's gangrene, Hyperlipemia, Hypertension, multiple Skin abscesses, DUNLAP, bx showed stage IV fibrosis, Neuropathy, Peripheral vascular disease (CMS/HCC),  and Thrombophlebitis.    Past Surgical History:  has a past surgical history that includes Testicle surgery (Right); Leg Surgery; Finger amputation (Left, 1/30/2017); Leg amputation, lower tibia/fibula (Left, 3/2/2017); Esophagogastroduodenoscopy; Tonsillectomy (1975); Abdominal Wall Abscess Incision and Drainage (N/A, 4/26/2019); and Hemorrhoid surgery (N/A, 8/2/2019).    Family History: family history includes Arthritis in his father and mother; Diabetes in his brother and father; Heart attack in his father; Hyperlipidemia in his father; Hypertension in his brother, father, and mother; Kidney disease in his mother; Mental illness in his sister; Obesity in his brother; Stroke in his mother.    Social History:  reports that he has never smoked. He has never used smokeless tobacco. He reports that he does not drink alcohol or use drugs.    Medications: Scheduled Meds:  amLODIPine 10 mg Oral Q24H   DULoxetine 30 mg Oral Daily   insulin detemir 20 Units Subcutaneous Q12H   insulin lispro 0-9 Units Subcutaneous 4x Daily With Meals & Nightly   metoprolol tartrate 100 mg Oral Q12H   piperacillin-tazobactam 3.375 g Intravenous Q8H   sevelamer 800 mg Oral TID With Meals   sodium chloride 10 mL Intravenous Q12H   terazosin 5 mg Oral Nightly     Continuous Infusions:  sodium chloride 100 mL/hr Last Rate: 100 mL/hr (10/09/19 0509)     PRN Meds:.•  acetaminophen **OR** acetaminophen **OR** acetaminophen  •  dextrose  •  dextrose  •  glucagon (human recombinant)  •  HYDROcodone-acetaminophen  •  melatonin  •  ondansetron  •  sodium chloride  No Known Allergies    Objective    Physical Exam:   Vital Signs have been reviewed  Physical Exam  Ill-appearing white male who appears older than his stated age.  HEENT: NCAT PERRLA EOMI  Heart regular rate and rhythm  Lungs: Clear to auscultation  Abdomen with mild tenderness in his right flank no masses or ecchymosis  Genitalia penis is normal circumcised  Scrotum and testes are  normal.  Rectal: Deferred  Extremities status post right BKA  Neuro: Intact  Psych: Normal      Results Reviewed:  I have personally reviewed current lab, radiology, and data and agree with results.  Fluid collection on CT scan around the middle and inferior pole of the right kidney appears to be blood or potentially purulence(that seems less likely radiographically but more likely clinically since his white count is 30,000).  There is also air in the dome of his bladder and he denies having recent instrumentation.  Results from last 7 days   Lab Units 10/09/19  0331   WBC 10*3/mm3 29.99*   HEMOGLOBIN g/dL 10.2*   PLATELETS 10*3/mm3 235     Results from last 7 days   Lab Units 10/09/19  0331   SODIUM mmol/L 125*   POTASSIUM mmol/L 3.6   CO2 mmol/L 22.0   BUN mg/dL 29*   CREATININE mg/dL 0.75*   GLUCOSE mg/dL 341*   CALCIUM mg/dL 8.4*             Perinephric abscess    DUNLAP Cirrhosis    Essential hypertension    Non-compliance    Hyperlipemia    Type 2 diabetes mellitus with circulatory disorder and uncontrolled    S/P BKA (below knee amputation), left (CMS/HCC)    Gastroparesis    Acute UTI (urinary tract infection)        Assessment/Plan   Leukocytosis with right perinephric fluid collection.  This could be a perinephric abscess or blood collection.  We will ask interventional radiology to do CT-guided drainage of this.    I inserted a Walker catheter at the bedside for maximal drainage of the bladder and for catheterized urinalysis and culture.  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  Assessment & Plan    I discussed the patients findings and my recommendations with: Patient and his significant other      Brian Marks MD  10/09/19  8:49 AM

## 2019-10-09 NOTE — CONSULTS
Clinical Nutrition   Reason For Visit: Need for education (RN consult)    Patient Name: Kendrick Crystal  YOB: 1970  MRN: [id number]  Date of Encounter: 10/09/19 10:49 AM  Admission date: 10/8/2019    Patient and spouse present during visit. Patient reports he eats whatever he wants and takes insulin only when he remembers. Patient also states that he drinks 1 pop (regular, not diet) daily and water the rest of the day. Patient declines inpatient nutrition education and also states he is not interested in and will not be attending any outpatient education/counseling.      Nutrition Assessment     Admission Problem List:  Nausea  Dry heaves  Abdominal pain  Diarrhea  UTI  Sepsis  Right perinephritic abscess      Applicable Nutrition-Related Information:  Left BKA (2017)  I&D of abdominal wall abscess (4/26/19)  Excision and drainage of perirectal abscess (8/2/19)        PMH: He  has a past medical history of Abdominal wall cellulitis (5/20/2019), JUAN (acute kidney injury) (CMS/HCC) (7/29/2018), Arthritis, Bipolar 1 disorder (CMS/HCC), Cellulitis of right anterior lower leg (7/29/2018), Cirrhosis (CMS/HCC), Counseling for insulin pump, Depression, Diabetes mellitus (CMS/HCC), Encephalopathy, hepatic (CMS/HCC), H/O degenerative disc disease, History of Lissa's gangrene, Hyperlipemia, Hypertension, multiple Skin abscesses, DUNLAP, bx showed stage IV fibrosis, Neuropathy, Peripheral vascular disease (CMS/HCC), and Thrombophlebitis.   PSxH: He  has a past surgical history that includes Testicle surgery (Right); Leg Surgery; Finger amputation (Left, 1/30/2017); Leg amputation, lower tibia/fibula (Left, 3/2/2017); Esophagogastroduodenoscopy; Tonsillectomy (1975); Abdominal Wall Abscess Incision and Drainage (N/A, 4/26/2019); and Hemorrhoid surgery (N/A, 8/2/2019).        Reported/Observed/Food/Nutrition Related History   Patient's spouse reports that patient will need soft foods when PO diet resumed because he  has no teeth. Patient reports he tolerates meats once he cuts them up. RD will send chopped meats.    Anthropometrics   Height: 75 in  Weight: 243 lbs (bedscale weight 10/9 per nsg doc)  BMI: N/A (left BKA)  UBW: 230 lbs (8/17/19 bedscale wt per EMR)      Weight change: no recent unintentional weight loss       Labs reviewed   Labs reviewed: Yes; HgbA1c = 14.1 (10/8)    Medications reviewed   Medications reviewed: Yes  Pertinent: insulin, zosyn, renvela  GTT: NS @ 100 ml/hr    Current Nutrition Prescription   PO: NPO Diet      Nutrition Diagnosis     Problem Food and nutrition knowledge deficit   Etiology Unwilling to apply information   Signs/Symptoms HgbA1c = 14.1 (10/8); patient states he eats whatever he wants and takes his insulin only when he remembers     Intervention   Intervention: Follow treatment progress, Care plan reviewed, Education offered/refused     Patient and spouse present during visit. Patient reports he eats whatever he wants and takes insulin only when he remembers. Patient also states that he drinks 1 pop (regular, not diet) daily and water the rest of the day. Patient declines inpatient nutrition education and also states he is not interested in and will not be attending any outpatient education/counseling.      Goal:   General: Nutrition support treatment      Monitoring/Evaluation:     Monitoring/Evaluation: Per protocol  Will Continue to follow per protocol  Mary Cao RD  Time Spent: 25 min

## 2019-10-09 NOTE — PLAN OF CARE
Problem: Patient Care Overview  Goal: Individualization and Mutuality  Outcome: Ongoing (interventions implemented as appropriate)    Goal: Discharge Needs Assessment  Outcome: Ongoing (interventions implemented as appropriate)    Goal: Interprofessional Rounds/Family Conf  Outcome: Ongoing (interventions implemented as appropriate)      Problem: Fall Risk (Adult)  Goal: Identify Related Risk Factors and Signs and Symptoms  Outcome: Ongoing (interventions implemented as appropriate)    Goal: Absence of Fall  Outcome: Ongoing (interventions implemented as appropriate)      Problem: Skin Injury Risk (Adult)  Goal: Identify Related Risk Factors and Signs and Symptoms  Outcome: Ongoing (interventions implemented as appropriate)    Goal: Skin Health and Integrity  Outcome: Ongoing (interventions implemented as appropriate)      Problem: Pain, Acute (Adult)  Goal: Identify Related Risk Factors and Signs and Symptoms  Outcome: Ongoing (interventions implemented as appropriate)    Goal: Acceptable Pain Control/Comfort Level  Outcome: Ongoing (interventions implemented as appropriate)      Problem: Infection, Risk/Actual (Adult)  Goal: Identify Related Risk Factors and Signs and Symptoms  Outcome: Ongoing (interventions implemented as appropriate)    Goal: Infection Prevention/Resolution  Outcome: Ongoing (interventions implemented as appropriate)

## 2019-10-09 NOTE — PROGRESS NOTES
[facility]  PROGRESS NOTE    Patient Name: Kendrick Crystal  : 1970  MRN: [id number]    Date of Admission: 10/8/2019  Primary Care Physician: No primary care provider on file.    Subjective   Subjective     CC:  Right flank pain    HPI:  Pt very upset because he is hungry. Wants to know when he can eat. Denies any new issues overnight.     Review of Systems  Gen- No fevers, chills  CV- No chest pain, palpitations  Resp- No cough, dyspnea  GI- No N/V/D, abd pain        All other systems reviewed and negative.     Objective   Objective     Vital Signs:   Temp:  [98.7 °F (37.1 °C)-99.2 °F (37.3 °C)] 98.9 °F (37.2 °C)  Heart Rate:  [] 96  Resp:  [16-20] 18  BP: (111-153)/(72-85) 138/73        Physical Exam:  Constitutional: No acute distress, awake, alert  HENT: NCAT, mucous membranes moist  Respiratory: Clear to auscultation bilaterally, respiratory effort normal   Cardiovascular: RRR, no murmurs, rubs, or gallops, palpable pedal pulses bilaterally  Gastrointestinal: Positive bowel sounds, soft, nontender, nondistended  Musculoskeletal: No bilateral ankle edema  Psychiatric: Appropriate affect, cooperative  Neurologic: Oriented x 3, strength symmetric in all extremities, Cranial Nerves grossly intact to confrontation, speech clear  Skin: No rashes  Results Reviewed:    Results from last 7 days   Lab Units 10/09/19  0331 10/08/19  2008   WBC 10*3/mm3 29.99* 30.92*   HEMOGLOBIN g/dL 10.2* 11.4*   HEMATOCRIT % 31.2* 34.6*   PLATELETS 10*3/mm3 235 257   PROCALCITONIN ng/mL 1.60*  --      Results from last 7 days   Lab Units 10/09/19  0331 10/08/19  2008   SODIUM mmol/L 125* 124*   POTASSIUM mmol/L 3.6 4.3   CHLORIDE mmol/L 88* 84*   CO2 mmol/L 22.0 23.0   BUN mg/dL 29* 33*   CREATININE mg/dL 0.75* 0.85   GLUCOSE mg/dL 341* 449*   CALCIUM mg/dL 8.4* 9.2   ALT (SGPT) U/L  --  10   AST (SGOT) U/L  --  9   TROPONIN T ng/mL 0.031* 0.042*     Estimated Creatinine  Clearance: 156.1 mL/min (A) (by C-G formula based on SCr of 0.75 mg/dL (L)).    Microbiology Results Abnormal     None          Imaging Results (last 24 hours)     Procedure Component Value Units Date/Time    CT Abdomen Pelvis With Contrast [[id number]] Collected:  10/08/19 2202     Updated:  10/08/19 2222    Narrative:       CT ABDOMEN AND PELVIS WITH CONTRAST, 10/8/2019    HISTORY:  49-year-old male in the ED complaining of 2 day history right side abdomen pain, nausea, vomiting and generalized weakness. History of hepatic cirrhosis (DUNLAP). History of diabetes with left BKA and multiple abdominal wall abscesses and lower extremity  cellulitis in 2018.    TECHNIQUE:  CT imaging of the abdomen and pelvis with IV contrast. Radiation dose reduction techniques included automated exposure control. Radiation audit for CT and nuclear cardiology exams in the last 12 months: 7.    COMPARISON:  *  CT abdomen/pelvis, 8/18/2019.    ABDOMEN FINDINGS:  Images show a right lower perinephric fluid collection that is either subcapsular or pericapsular. This extends along the lower kidney for a length of about 8.5 cm and has a maximum thickness of 2.5 cm. The fluid is mixed attenuation and may be  hemorrhagic. Correlate for any recent history of blunt trauma to the flank. Renal parenchyma shows no disruption or abnormal enhancement. There is no fracture of overlying right lower posterior ribs or transverse spinous processes. Given the history,  perinephric abscess is possible but is significantly less likely given normal renal parenchymal enhancement. No evidence of urinary obstruction. No visible nephrolithiasis.    Advanced hepatic cirrhosis. Mild splenomegaly. No suspicious liver lesion. No upper abdominal ascites. Hepatic vasculature appears patent. No gallbladder distention or bile duct dilatation. Negative pancreas.    Benign left adrenal myelolipoma measuring about 2.9 cm. Normal caliber abdominal aorta. Small bowel and colon  are normal in caliber and appearance, as imaged. The appendix is normal. No gastric distention or distal esophageal dilatation.    PELVIS FINDINGS:  Air within the urinary bladder, likely iatrogenic. There is at least mild diffuse bladder wall thickening. Prostate and rectum are unremarkable. No inguinal hernia.    Lung base images show multiple benign calcified granulomas.      Impression:       1.  Small mixed attenuation right perinephric fluid collection along the inferior capsule, not present on the prior exam. Perinephric hematoma is a consideration. Correlate clinically for any history of blunt flank trauma. While abscess is possible, this  is unlikely as right renal parenchyma enhances normally with no evidence of pyelonephritis. Laboratory correlation recommended.  2.  Advanced hepatic cirrhosis. Splenomegaly.  3.  Urinary bladder wall thickening. Gas within the bladder is most likely iatrogenic.  4.  Benign left adrenal myelolipoma.    Signer Name: Fadi Healy MD   Signed: 10/8/2019 10:02 PM   [facility]    Radiology [facility]    XR Chest PA & Lateral [[id number]] Collected:  10/08/19 2210     Updated:  10/08/19 2212    Narrative:       CHEST X-RAY, 10/8/2019      HISTORY:    49-year-old male in the ED with reported diabetic ketoacidosis.      TECHNIQUE:  AP portable upright chest series.      FINDINGS:  Heart size and pulmonary vascularity are normal. The lungs are expanded and clear. No visible pulmonary infiltrate or pleural effusion. Lower lung benign calcified granulomas.      Impression:       Negative chest.    Signer Name: Fadi Healy MD   Signed: 10/8/2019 10:10 PM   Workstation Name: [facility]    Radiology [facility]          Results for orders placed during the hospital encounter of 05/18/19   Adult Transthoracic Echo Complete W/ Cont if Necessary Per Protocol    Narrative · Estimated EF = 60%.  · Mild mitral valve  regurgitation is present  · Mild tricuspid valve regurgitation is present.  · Calculated right ventricular systolic pressure from tricuspid   regurgitation is 29 mmHg.          I have reviewed the medications:  Scheduled Meds:  amLODIPine 10 mg Oral Q24H   DULoxetine 30 mg Oral Daily   insulin detemir 20 Units Subcutaneous Q12H   insulin lispro 0-9 Units Subcutaneous 4x Daily With Meals & Nightly   metoprolol tartrate 100 mg Oral Q12H   piperacillin-tazobactam 3.375 g Intravenous Q8H   sevelamer 800 mg Oral TID With Meals   sodium chloride 10 mL Intravenous Q12H   terazosin 5 mg Oral Nightly     Continuous Infusions:  sodium chloride 100 mL/hr Last Rate: 100 mL/hr (10/09/19 0509)     PRN Meds:.•  acetaminophen **OR** acetaminophen **OR** acetaminophen  •  dextrose  •  dextrose  •  glucagon (human recombinant)  •  HYDROcodone-acetaminophen  •  melatonin  •  ondansetron  •  sodium chloride      Assessment/Plan   Assessment / Plan     Active Hospital Problems    Diagnosis  POA   • **Perinephric abscess [N15.1]  Yes   • Bacteremia due to Klebsiella pneumoniae [R78.81]  Unknown     Priority: High   • Acute UTI (urinary tract infection) [N39.0]  Yes   • Gastroparesis [K31.84]  Yes   • S/P BKA (below knee amputation), left (CMS/HCC) [Z89.512]  Not Applicable   • Type 2 diabetes mellitus with circulatory disorder and uncontrolled [E11.59]  Yes   • DUNLAP Cirrhosis [K74.60]  Yes   • Essential hypertension [I10]  Yes   • Hyperlipemia [E78.5]  Yes   • Non-compliance [Z91.14]  Not Applicable      Resolved Hospital Problems   No resolved problems to display.        Brief Hospital Course to date:  Kendrick Crystal is a 49 y.o. male with PMHx significant for uncontrolled DMII with neuropathy, HLD, HTN, DUNLAP cirrhosis, PVD s/p L BKA, BPH and recent admission for sepsis related to perirectal abscess with fistula formation. He underwent I&D with Dr. Payne and was followed by infectious disease. He presented on 10/8 with complaints of  right flank pain, was found to be septic with right perinephric abscess.    Plan:    Sepsis secondary to Right Perinephritic abscess and UTI  Klebsiella pneumoniae bacteremia  - blood and urine cultures pending, adjust abx accordingly  - Zosyn q 8 hours  - continue IVFs  -- await susceptibilities on blood cultures, ID consulted (Dr. Dong)  - urology, Dr. Marks to see today for possible operative drainage, plan is for IR drainage of abscess  - tylenol PRN for fever     Uncontrolled DM2 with Hyperglycemia  - A1c 14.0, he says his blood sugar runs >400 at baseline  - medical non compliance. Currently hyperglycemic. Not in DKA as there is no acidosis present  - initially started on insulin gtt in the ED, has since been discontinued  - on lantus 30 units BID and humalog 5 units TID. Decreased to 20 units BID and add moderate SSI for now  - diabetic educator and nutrition consult     DUNLAP cirrhosis:  - stable. Avoid hepatotoxins     HTN:  - controlled and stable. Continue home meds      Hyponatremia:  - corrected sodium for glucose is 130, suspect hypovolemic hyponatremia related to decreased PO intake   - only marginally better this am.  Continue to monitor, consider NAL consult if worsens.       DVT Prophylaxis:  mechanical    Disposition: I expect the patient to be discharged TBD.    CODE STATUS:   Code Status and Medical Interventions:   Ordered at: 10/08/19 7284     Level Of Support Discussed With:    Patient     Code Status:    CPR     Medical Interventions (Level of Support Prior to Arrest):    Full         Electronically signed by Latha Lenz MD, 10/09/19, 7:32 AM.

## 2019-10-09 NOTE — H&P
"    [facility] Medicine Services  HISTORY AND PHYSICAL    Patient Name: Kendrick Crystal  : 1970  MRN: [id number]  Primary Care Physician: Keyanna Leone APRN  Date of admission: 10/8/2019      Subjective   Subjective     Chief Complaint:  Abdominal pain    HPI:  Kendrick Crystal is a 49 y.o. male with a past medical history significant for uncontrolled DM2, gastroparesis, DUNLAP cirrhosis, HTN, HLD, and left BKA who presents with complaints of right lower quadrant abdominal pain going on for the past 2 days. Pain is constant and worse with movement and PO intake. Rated 10/10 at its worst. There is radiation to the right flank. Patient also endorsing associated nausea without vomiting, diarrhea, dysuria, and generalized weakness. His blood sugar typically runs in the 300's but for the past 2-3 days it has been greater than 500. He takes his insulin \" only when he remembers\" and had a history of medical non compliance. Reports to ED for further evaluation. Currently denies associated fever, cough, congestion, SOB, chest pain, blood in stool or urine.    Records reviewed indicate patient was discharged from this service 19 after being admitted for sepsis secondary to perirectal abscess and fistula. He underwent operative drainage per Dr. Payne. Cultures isolated E. Coli and Klebsiella, susceptible to zosyn. Was followed by ID, Dr. Dong. Patient was then readmitted three days later with urosepsis.     Emergency Department Evaluation; vitals stable. Troponin 0.042. Glucose 450. A1c 14.10. WBC 30,000. UA positive for acute infection. CXR clear. CT A/P demonstrates; right perinephritic abscess vs hematoma plus splenomegaly with advanced hepatic cirrhosis.     Review of Systems   Constitutional: Negative for chills and fever.   HENT: Negative for congestion and trouble swallowing.    Eyes: Negative for photophobia and visual disturbance.   Respiratory: Negative for cough and shortness of " breath.    Cardiovascular: Negative for chest pain and leg swelling.   Gastrointestinal: Positive for abdominal pain, diarrhea and nausea. Negative for vomiting.   Endocrine: Negative for heat intolerance and polydipsia.   Genitourinary: Positive for dysuria and flank pain. Negative for decreased urine volume and hematuria.   Musculoskeletal: Negative for back pain and gait problem.   Skin: Negative for pallor and rash.   Allergic/Immunologic: Positive for immunocompromised state.   Neurological: Positive for weakness. Negative for dizziness and headaches.   Hematological: Negative for adenopathy.   Psychiatric/Behavioral: Negative for agitation and confusion.          All other systems reviewed and are negative.     Personal History     Past Medical History:   Diagnosis Date   • Abdominal wall cellulitis 5/20/2019   • JUAN (acute kidney injury) (CMS/HCC) 7/29/2018   • Arthritis    • Bipolar 1 disorder (CMS/HCC)    • Cellulitis of right anterior lower leg 7/29/2018   • Cirrhosis (CMS/HCC)    • Counseling for insulin pump    • Depression    • Diabetes mellitus (CMS/HCC)    • Encephalopathy, hepatic (CMS/HCC)    • H/O degenerative disc disease    • History of Lissa's gangrene     right leg/testicle   • Hyperlipemia    • Hypertension    • multiple Skin abscesses    • DUNLAP, bx showed stage IV fibrosis    • Neuropathy    • Peripheral vascular disease (CMS/HCC)    • Thrombophlebitis        Past Surgical History:   Procedure Laterality Date   • ABDOMINAL WALL ABSCESS INCISION AND DRAINAGE N/A 4/26/2019    Procedure: ABDOMINAL WALL DEBRIDEMENT;  Surgeon: Fadi Kern MD;  Location:  [email or web address] OR;  Service: General   • AMPUTATION DIGIT Left 1/30/2017    Procedure: left fourth and fifth transmetatarsal toe amputation ;  Surgeon: Juancho Martinez MD;  Location:  [email or web address] OR;  Service:    • BELOW KNEE AMPUTATION Left 3/2/2017    Procedure: AMPUTATION BELOW KNEE, SHMUEL;  Surgeon: Juancho Martinez MD;  Location:  [facility];   Service:    • ENDOSCOPY     • HEMORRHOIDECTOMY N/A 8/2/2019    Procedure: EXCISION AND DRAIN PERIRECTAL ABSCESS;  Surgeon: Mumtaz Payne MD;  Location: [facility];  Service: General   • LEG SURGERY     • TESTICLE SURGERY Right     DEBRIDEMENT FROM GANGRENE   • TONSILLECTOMY  1975       Family History: family history includes Arthritis in his father and mother; Diabetes in his brother and father; Heart attack in his father; Hyperlipidemia in his father; Hypertension in his brother, father, and mother; Kidney disease in his mother; Mental illness in his sister; Obesity in his brother; Stroke in his mother. Otherwise pertinent FHx was reviewed and unremarkable.     Social History:  reports that he has never smoked. He has never used smokeless tobacco. He reports that he does not drink alcohol or use drugs.  Social History     Social History Narrative    Lives in [facility]       Medications:    Available home medication information reviewed.    (Not in a hospital admission)    No Known Allergies    Objective   Objective     Vital Signs:   Temp:  [98.7 °F (37.1 °C)] 98.7 °F (37.1 °C)  Heart Rate:  [] 103  Resp:  [17-20] 17  BP: (111-153)/(72-85) 134/83        Physical Exam   Constitutional: Awake, alert  Eyes: PERRLA, sclerae anicteric, no conjunctival injection  HENT: NCAT, mucous membranes moist  Neck: Supple, no thyromegaly, no lymphadenopathy, trachea midline  Respiratory: Clear to auscultation bilaterally, nonlabored respirations   Cardiovascular: RRR, no murmurs, rubs, or gallops, palpable pedal pulses bilaterally  Gastrointestinal: Positive bowel sounds, soft, TTP to right mid abdomen  Musculoskeletal: No bilateral ankle edema, no clubbing or cyanosis to extremities  Left BKA  Psychiatric: Appropriate affect, cooperative  Neurologic: Oriented x 3, strength symmetric in all extremities, Cranial Nerves grossly intact to confrontation, speech clear  Skin: No rashes    Results Reviewed:  I have personally  reviewed current lab and radiology data.    Results from last 7 days   Lab Units 10/08/19  2008   WBC 10*3/mm3 30.92*   HEMOGLOBIN g/dL 11.4*   HEMATOCRIT % 34.6*   PLATELETS 10*3/mm3 257     Results from last 7 days   Lab Units 10/08/19  2008   SODIUM mmol/L 124*   POTASSIUM mmol/L 4.3   CHLORIDE mmol/L 84*   CO2 mmol/L 23.0   BUN mg/dL 33*   CREATININE mg/dL 0.85   GLUCOSE mg/dL 449*   CALCIUM mg/dL 9.2   ALT (SGPT) U/L 10   AST (SGOT) U/L 9   TROPONIN T ng/mL 0.042*   LACTATE mmol/L 1.8     Estimated Creatinine Clearance: 138.3 mL/min (by C-G formula based on SCr of 0.85 mg/dL).  Brief Urine Lab Results  (Last result in the past 365 days)      Color   Clarity   Blood   Leuk Est   Nitrite   Protein   CREAT   Urine HCG        10/08/19 2256 Yellow Cloudy Moderate (2+) Small (1+) Positive Negative             Imaging Results (last 24 hours)     Procedure Component Value Units Date/Time    CT Abdomen Pelvis With Contrast [[id number]] Collected:  10/08/19 2202     Updated:  10/08/19 2222    Narrative:       CT ABDOMEN AND PELVIS WITH CONTRAST, 10/8/2019    HISTORY:  49-year-old male in the ED complaining of 2 day history right side abdomen pain, nausea, vomiting and generalized weakness. History of hepatic cirrhosis (DUNLAP). History of diabetes with left BKA and multiple abdominal wall abscesses and lower extremity  cellulitis in 2018.    TECHNIQUE:  CT imaging of the abdomen and pelvis with IV contrast. Radiation dose reduction techniques included automated exposure control. Radiation audit for CT and nuclear cardiology exams in the last 12 months: 7.    COMPARISON:  *  CT abdomen/pelvis, 8/18/2019.    ABDOMEN FINDINGS:  Images show a right lower perinephric fluid collection that is either subcapsular or pericapsular. This extends along the lower kidney for a length of about 8.5 cm and has a maximum thickness of 2.5 cm. The fluid is mixed attenuation and may be  hemorrhagic. Correlate for any recent history of blunt  trauma to the flank. Renal parenchyma shows no disruption or abnormal enhancement. There is no fracture of overlying right lower posterior ribs or transverse spinous processes. Given the history,  perinephric abscess is possible but is significantly less likely given normal renal parenchymal enhancement. No evidence of urinary obstruction. No visible nephrolithiasis.    Advanced hepatic cirrhosis. Mild splenomegaly. No suspicious liver lesion. No upper abdominal ascites. Hepatic vasculature appears patent. No gallbladder distention or bile duct dilatation. Negative pancreas.    Benign left adrenal myelolipoma measuring about 2.9 cm. Normal caliber abdominal aorta. Small bowel and colon are normal in caliber and appearance, as imaged. The appendix is normal. No gastric distention or distal esophageal dilatation.    PELVIS FINDINGS:  Air within the urinary bladder, likely iatrogenic. There is at least mild diffuse bladder wall thickening. Prostate and rectum are unremarkable. No inguinal hernia.    Lung base images show multiple benign calcified granulomas.      Impression:       1.  Small mixed attenuation right perinephric fluid collection along the inferior capsule, not present on the prior exam. Perinephric hematoma is a consideration. Correlate clinically for any history of blunt flank trauma. While abscess is possible, this  is unlikely as right renal parenchyma enhances normally with no evidence of pyelonephritis. Laboratory correlation recommended.  2.  Advanced hepatic cirrhosis. Splenomegaly.  3.  Urinary bladder wall thickening. Gas within the bladder is most likely iatrogenic.  4.  Benign left adrenal myelolipoma.    Signer Name: Fadi Healy MD   Signed: 10/8/2019 10:02 PM   Workstation Name: [email or web address]    [facility]    XR Chest PA & Lateral [[id number]] Collected:  10/08/19 2210     Updated:  10/08/19 2212    Narrative:       CHEST X-RAY, 10/8/2019      HISTORY:     49-year-old male in the ED with reported diabetic ketoacidosis.      TECHNIQUE:  AP portable upright chest series.      FINDINGS:  Heart size and pulmonary vascularity are normal. The lungs are expanded and clear. No visible pulmonary infiltrate or pleural effusion. Lower lung benign calcified granulomas.      Impression:       Negative chest.    Signer Name: Fadi Healy MD   Signed: 10/8/2019 10:10 PM   Workstation [facility]: [facility]    [facility]        Results for orders placed during the hospital encounter of 05/18/19   Adult Transthoracic Echo Complete W/ Cont if Necessary Per Protocol    Narrative · Estimated EF = 60%.  · Mild mitral valve regurgitation is present  · Mild tricuspid valve regurgitation is present.  · Calculated right ventricular systolic pressure from tricuspid   regurgitation is 29 mmHg.          Assessment/Plan   Assessment / Plan     Active Hospital Problems    Diagnosis POA   • **Perinephric abscess [N15.1] Yes     Priority: High   • Acute UTI (urinary tract infection) [N39.0] Yes     Priority: High   • Gastroparesis [K31.84] Yes     Priority: Medium   • Type 2 diabetes mellitus with circulatory disorder and uncontrolled [E11.59] Yes     Priority: Medium   • DUNLAP Cirrhosis [K74.60] Yes     Priority: Medium   • Essential hypertension [I10] Yes     Priority: Medium   • S/P BKA (below knee amputation), left (CMS/HCC) [Z89.512] Not Applicable     Priority: Low   • Hyperlipemia [E78.5] Yes     Priority: Low   • Non-compliance [Z91.14] Not Applicable     Priority: Low         Sepsis secondary to Right Perinephritic abscess and UTI  - blood and urine cultures pending, adjust abx accordingly  - Zosyn q 8 hours  - IVF, bolus per sepsis protocol in ED  - consider consult to [facility]IA as he followed patient during previous admission  - urology, Dr. Marks to see in AM for possible operative drainage  - tylenol PRN for fever  - NPO after midnight    Uncontrolled DM2  with Hyperglycemia  - A1c 14.0, he says his blood sugar runs >400 at baseline  - medical non compliance. Currently hyperglycemic. Not in DKA as there is no acidosis present  - initially started on insulin gtt in the ED, will discontinue  - on lantus 30 units BID and humalog 5 units TID. Decrease to 20 units BID and add moderate SSI for now  - diabetic educator and nutrition consult    DUNLAP cirrhosis:  - stable. Avoid hepatotoxins    HTN:  - controlled and stable. Continue home meds     Hyponatremia:  - corrected sodium for glucose is 130, suspect hypovolemic hyponatremia related to decreased PO intake   - repeat in AM after IVF    DVT prophylaxis:  mechanical    CODE STATUS:  Full code  Code Status and Medical Interventions:   Ordered at: 10/08/19 4735     Level Of Support Discussed With:    Patient     Code Status:    CPR     Medical Interventions (Level of Support Prior to Arrest):    Full       Admission Status:  I believe this patient meets INPATIENT status due to IV ABX.  I feel patient’s risk for adverse outcomes and need for care warrant INPATIENT evaluation and I predict the patient’s care encounter to likely last beyond 2 midnights.    Electronically signed by Yasmany Martinez PA-C, 10/08/19, 11:42 PM.      Brief Attending Admission Attestation     I have seen and examined the patient, performing an independent face-to-face diagnostic evaluation with plan of care reviewed and developed with the advanced practice clinician (APC).      Brief Summary Statement:   Kendrick Crystal is a 49 y.o. male with PMHx significant for uncontrolled DMII with neuropathy, HLD, HTN, DUNLAP cirrhosis, PVD s/p L BKA, BPH and recent admission for sepsis related to perirectal abscess with fistula formation. He underwent I&D with Dr. Payne and was followed by infectious disease. He presents today with complaints of dry heaving, fever, flank pain and elevated blood sugars. Patient says his symptoms began 3 days ago and continued to get  progressively worse. He has not been able to keep anything down PO for several days. His blood sugars have been running 400-500 at home (although he says he normally runs 300-400). He reports associated weakness and malaise. In the ED CT abd/pelvis revealed right perinephric abscess. WBC 30K. UA grossly positive.    Remainder of detailed HPI is as noted above and has been reviewed and/or edited by me for completeness.      Attending Physical Exam:  Constitutional: Awake, alert, patient appears chronically ill and older than stated age  Eyes: PERRLA, sclerae anicteric, no conjunctival injection  HENT: NCAT, mucous membranes dry  Neck: Supple, no thyromegaly, no lymphadenopathy, trachea midline  Respiratory: Clear to auscultation bilaterally, nonlabored respirations   Cardiovascular: tachycardic, no murmurs, rubs, or gallops, palpable pedal pulses bilaterally  Gastrointestinal: Positive bowel sounds, soft, nontender, nondistended  Musculoskeletal: No bilateral ankle edema, no clubbing or cyanosis to extremities., L BKA  Psychiatric: Appropriate affect, cooperative  Neurologic: Oriented x 3, strength symmetric in all extremities, Cranial Nerves grossly intact to confrontation, speech clear  Skin: No rashes        Brief Assessment/Plan :  See above for further detailed assessment and plan developed with APC which I have reviewed and/or edited for completeness.      Electronically signed by Margo Tafoya DO, 10/09/19, 12:36 AM.

## 2019-10-09 NOTE — PROGRESS NOTES
Discharge Planning Assessment  [facility]     Patient Name: Kendrick Crystal  MRN: [id number]  Today's Date: 10/9/2019    Admit Date: 10/8/2019    Discharge Needs Assessment     Row Name 10/09/19 1055       Living Environment    Lives With  spouse    Current Living Arrangements  home/apartment/condo    Living Arrangement Comments  Lives in an apartment. Some steps. Spouse assists with care needs as needed.       Discharge Needs Assessment    Equipment Currently Used at Home  bath bench;wheelchair;prosthesis;wheelchair, motorized;commode;grab bar    Equipment Needed After Discharge  none    Discharge Coordination/Progress  Has been to [facility] in the past after his amputation. Refused in rehab last admission. Has had [facility] in the past.        Discharge Plan     Row Name 10/09/19 1059       Plan    Plan  Home at Dc vs home with HH    Patient/Family in Agreement with Plan  yes    Plan Comments  I spoke with the pts spouse. The pt was sleeping. The spouse kept falling asleep during our comversation. She denied any DC needs. CM will follow.    Final Discharge Disposition Code  01 - home or self-care        Destination      No service coordination in this encounter.      Durable Medical Equipment      No service coordination in this encounter.      Dialysis/Infusion      No service coordination in this encounter.      Home Medical Care      No service coordination in this encounter.      Therapy      No service coordination in this encounter.      Community Resources      No service coordination in this encounter.          Demographic Summary    No documentation.       Functional Status     Row Name 10/09/19 1054       Functional Status    Usual Activity Tolerance  moderate       Functional Status, IADL    Medications  independent    Meal Preparation  assistive person    Housekeeping  assistive person    Laundry  assistive person    Shopping  assistive person        Psychosocial    No documentation.        Abuse/Neglect    No documentation.       Legal    No documentation.       Substance Abuse    No documentation.       Patient Forms    No documentation.           Margi Alas RN

## 2019-10-09 NOTE — CONSULTS
Attempted to see Mr. Crystal for Diabetes Education today.  Spoke with him and his spouse briefly.  He states he has had Diabetes for 10 years.  He states he is on insulin basal daily and Humalog before each meal when he remembers to take it.  Wife states that he was seeing PEDRO Leone for family doctor, but she states that he was recently told he would need to find another family doctor due to noncompliance.  He was very frustrated today because he is NPO for a procedure and he wants to know when he can eat.  They stated that they were not interested in Diabetes education. I gave them a handout with outpatient phone number and reminded them that they are eligible for a free outpatient visit when they get home and are feeling better.  I also left them with the inpatient Diabetes Education phone number and encouraged them to call or have the nurse call us if they have any questions or would like to speak with an educator before they leave here.  Thank you.

## 2019-10-09 NOTE — NURSING NOTE
Patient placed on cardiac monitors, vitals stable. Images taken and reviewed by Dr. Enriquez. Soheila-renal right fluid collection accessed and drained by Dr. Enriquez. A total of 10 ml serosanguinous fluid removed and sent to lab. Patient tolerated well Report to 5G.

## 2019-10-10 LAB
ANION GAP SERPL CALCULATED.3IONS-SCNC: 13 MMOL/L (ref 5–15)
BACTERIA SPEC AEROBE CULT: ABNORMAL
BACTERIA SPEC AEROBE CULT: ABNORMAL
BUN BLD-MCNC: 25 MG/DL (ref 6–20)
BUN/CREAT SERPL: 34.2 (ref 7–25)
CALCIUM SPEC-SCNC: 8.1 MG/DL (ref 8.6–10.5)
CHLORIDE SERPL-SCNC: 92 MMOL/L (ref 98–107)
CO2 SERPL-SCNC: 24 MMOL/L (ref 22–29)
CREAT BLD-MCNC: 0.73 MG/DL (ref 0.76–1.27)
DEPRECATED RDW RBC AUTO: 44 FL (ref 37–54)
ERYTHROCYTE [DISTWIDTH] IN BLOOD BY AUTOMATED COUNT: 14.2 % (ref 12.3–15.4)
GFR SERPL CREATININE-BSD FRML MDRD: 113 ML/MIN/1.73
GLUCOSE BLD-MCNC: 170 MG/DL (ref 65–99)
GLUCOSE BLDC GLUCOMTR-MCNC: 134 MG/DL (ref 70–130)
GLUCOSE BLDC GLUCOMTR-MCNC: 168 MG/DL (ref 70–130)
GLUCOSE BLDC GLUCOMTR-MCNC: 195 MG/DL (ref 70–130)
GLUCOSE BLDC GLUCOMTR-MCNC: 228 MG/DL (ref 70–130)
GRAM STN SPEC: ABNORMAL
HCT VFR BLD AUTO: 29.7 % (ref 37.5–51)
HGB BLD-MCNC: 9.6 G/DL (ref 13–17.7)
ISOLATED FROM: ABNORMAL
ISOLATED FROM: ABNORMAL
MCH RBC QN AUTO: 27 PG (ref 26.6–33)
MCHC RBC AUTO-ENTMCNC: 32.3 G/DL (ref 31.5–35.7)
MCV RBC AUTO: 83.7 FL (ref 79–97)
PLATELET # BLD AUTO: 208 10*3/MM3 (ref 140–450)
PMV BLD AUTO: 12.7 FL (ref 6–12)
POTASSIUM BLD-SCNC: 3.6 MMOL/L (ref 3.5–5.2)
RBC # BLD AUTO: 3.55 10*6/MM3 (ref 4.14–5.8)
SODIUM BLD-SCNC: 129 MMOL/L (ref 136–145)
WBC NRBC COR # BLD: 25.2 10*3/MM3 (ref 3.4–10.8)

## 2019-10-10 PROCEDURE — 99233 SBSQ HOSP IP/OBS HIGH 50: CPT | Performed by: INTERNAL MEDICINE

## 2019-10-10 PROCEDURE — 85027 COMPLETE CBC AUTOMATED: CPT | Performed by: INTERNAL MEDICINE

## 2019-10-10 PROCEDURE — 82962 GLUCOSE BLOOD TEST: CPT

## 2019-10-10 PROCEDURE — 25010000002 MEROPENEM PER 100 MG: Performed by: INTERNAL MEDICINE

## 2019-10-10 PROCEDURE — 80048 BASIC METABOLIC PNL TOTAL CA: CPT | Performed by: INTERNAL MEDICINE

## 2019-10-10 PROCEDURE — 63710000001 INSULIN DETEMIR PER 5 UNITS: Performed by: PHYSICIAN ASSISTANT

## 2019-10-10 RX ADMIN — INSULIN LISPRO 2 UNITS: 100 INJECTION, SOLUTION INTRAVENOUS; SUBCUTANEOUS at 09:11

## 2019-10-10 RX ADMIN — INSULIN LISPRO 4 UNITS: 100 INJECTION, SOLUTION INTRAVENOUS; SUBCUTANEOUS at 12:27

## 2019-10-10 RX ADMIN — MEROPENEM 1 G: 1 INJECTION INTRAVENOUS at 18:08

## 2019-10-10 RX ADMIN — SODIUM CHLORIDE 100 ML/HR: 9 INJECTION, SOLUTION INTRAVENOUS at 09:10

## 2019-10-10 RX ADMIN — METOPROLOL TARTRATE 100 MG: 100 TABLET ORAL at 20:28

## 2019-10-10 RX ADMIN — INSULIN DETEMIR 20 UNITS: 100 INJECTION, SOLUTION SUBCUTANEOUS at 10:13

## 2019-10-10 RX ADMIN — DULOXETINE HYDROCHLORIDE 30 MG: 30 CAPSULE, DELAYED RELEASE ORAL at 09:11

## 2019-10-10 RX ADMIN — ACETAMINOPHEN 650 MG: 325 TABLET ORAL at 20:29

## 2019-10-10 RX ADMIN — MEROPENEM 1 G: 1 INJECTION INTRAVENOUS at 09:10

## 2019-10-10 RX ADMIN — TERAZOSIN HYDROCHLORIDE ANHYDROUS 5 MG: 5 CAPSULE ORAL at 20:28

## 2019-10-10 RX ADMIN — MEROPENEM 1 G: 1 INJECTION INTRAVENOUS at 01:17

## 2019-10-10 RX ADMIN — INSULIN LISPRO 2 UNITS: 100 INJECTION, SOLUTION INTRAVENOUS; SUBCUTANEOUS at 18:08

## 2019-10-10 RX ADMIN — INSULIN DETEMIR 20 UNITS: 100 INJECTION, SOLUTION SUBCUTANEOUS at 20:29

## 2019-10-10 NOTE — PROGRESS NOTES
[facility] Medicine Services  PROGRESS NOTE    Patient Name: Kendrick Crystal  : 1970  MRN: [id number]    Date of Admission: 10/8/2019  Primary Care Physician: No primary care provider on file.    Subjective   Subjective     CC:  Right flank pain    HPI:  Pt feeling pretty poorly this am, was borderline febrile overnight (Tmax 100.4F).     Review of Systems  Gen- as above  CV- No chest pain, palpitations  Resp- No cough, dyspnea  GI- No N/V/D, abd pain        All other systems reviewed and negative.     Objective   Objective     Vital Signs:   Temp:  [97.8 °F (36.6 °C)-100.4 °F (38 °C)] 98.1 °F (36.7 °C)  Heart Rate:  [75-94] 75  Resp:  [16-20] 16  BP: ()/(59-89) 95/59        Physical Exam:  Constitutional: No acute distress, awake, alert  HENT: NCAT, mucous membranes moist  Respiratory: Clear to auscultation bilaterally, respiratory effort normal   Cardiovascular: RRR, no murmurs, rubs, or gallops, palpable pedal pulses bilaterally  Gastrointestinal: Positive bowel sounds, soft, nontender, nondistended  Musculoskeletal: left BKA, no ankle edema RLE  Psychiatric: Appropriate affect, cooperative  Neurologic: Oriented x 3, strength symmetric in all extremities, Cranial Nerves grossly intact to confrontation, speech clear  Skin: No rashes  Results Reviewed:    Results from last 7 days   Lab Units 10/10/19  0444 10/09/19  0940 10/09/19  0331 10/08/19  2008   WBC 10*3/mm3 25.20*  --  29.99* 30.92*   HEMOGLOBIN g/dL 9.6*  --  10.2* 11.4*   HEMATOCRIT % 29.7*  --  31.2* 34.6*   PLATELETS 10*3/mm3 208  --  235 257   INR   --  1.17*  --   --    PROCALCITONIN ng/mL  --   --  1.60*  --      Results from last 7 days   Lab Units 10/10/19  0444 10/09/19  0331 10/08/19  2008   SODIUM mmol/L 129* 125* 124*   POTASSIUM mmol/L 3.6 3.6 4.3   CHLORIDE mmol/L 92* 88* 84*   CO2 mmol/L 24.0 22.0 23.0   BUN mg/dL 25* 29* 33*   CREATININE mg/dL 0.73* 0.75* 0.85   GLUCOSE mg/dL 170* 341* 449*   CALCIUM  mg/dL 8.1* 8.4* 9.2   ALT (SGPT) U/L  --   --  10   AST (SGOT) U/L  --   --  9   TROPONIN T ng/mL  --  0.031* 0.042*     Estimated Creatinine Clearance: 161.7 mL/min (A) (by C-G formula based on SCr of 0.73 mg/dL (L)).    Microbiology Results Abnormal     Procedure Component Value - Date/Time    Blood Culture - Blood, Arm, Left [[id number]]  (Abnormal) Collected:  10/08/19 2000    Lab Status:  Preliminary result Specimen:  Blood from Arm, Left Updated:  10/10/19 0544     Blood Culture Gram Negative Bacilli     Isolated from Aerobic and Anaerobic Bottles     Gram Stain Anaerobic Bottle Gram negative bacilli      Aerobic Bottle Gram negative bacilli    Blood Culture - Blood, Arm, Right [[id number]]  (Abnormal) Collected:  10/08/19 2012    Lab Status:  Preliminary result Specimen:  Blood from Arm, Right Updated:  10/10/19 0544     Blood Culture Gram Negative Bacilli     Isolated from Aerobic and Anaerobic Bottles     Gram Stain Aerobic Bottle Gram negative bacilli      Anaerobic Bottle Gram negative bacilli    Body Fluid Culture - Body Fluid, Peritoneum [[id number]]  (Abnormal) Collected:  10/09/19 1508    Lab Status:  Preliminary result Specimen:  Body Fluid from Peritoneum Updated:  10/09/19 2031     Gram Stain Many (4+) WBCs per low power field      Moderate (3+) Gram negative bacilli    Blood Culture ID, PCR - Blood, Arm, Left [[id number]]  (Abnormal) Collected:  10/08/19 2000    Lab Status:  Final result Specimen:  Blood from Arm, Left Updated:  10/09/19 1038     BCID, PCR Klebsiella pneumoniae. Identification by BCID PCR.          Imaging Results (last 24 hours)     Procedure Component Value Units Date/Time    CT Guided Abscess Drain Retroperitoneal [[id number]] Collected:  10/09/19 1611     Updated:  10/09/19 1715    Narrative:       EXAMINATION: CT GUIDED ABSCESS DRAIN RETROPERITONEAL- 10/09/2019     INDICATION: N15.1-Renal and perinephric abscess; A41.9-Sepsis,  unspecified organism; E86.0-Dehydration; E11.65-Type  2 diabetes mellitus  with hyperglycemia; I10-Essential (primary) hypertension;  Z86.14-Personal history of Methicillin resistant Staphylococcus aureus  infection; K74.60-Unspecified cirrhosis of liver; fluid collection     TECHNIQUE: CT-guided drainage of a perinephric fluid collection     The radiation dose reduction device was turned on for each scan per the  ALARA (As Low as Reasonably Achievable) protocol.     COMPARISON: NONE     FINDINGS: Patient referred for CT-guided drainage of a perinephric fluid  collection. Procedure and risks were explained to the patient. Informed  consent was obtained and signed. Patient placed in the left lateral  position upon the table. The right flank was prepped and draped in a  sterile fashion. 1% lidocaine used as a local anesthetic. Under CT  fluoroscopic guidance an 18-gauge 15 cm Chiba needle was advanced into  the perinephric fluid collection of approximately 10 ml of a reddish  fluid was removed with some debris within the fluid. There is a striated  delayed nephrogram seen of the right kidney suggesting possible  pyelonephritis with surrounding stranding of the perinephric fat.  Myelolipoma seen on the adrenal gland on the left. Chiba needle was  removed and no immediate complication occurred. Slight decrease seen in  size of the perinephric fluid collection status post drainage.       Impression:       CT-guided drainage of a perinephric fluid collection with no  immediate complication.     D:  10/09/2019  E:  10/09/2019     This report was finalized on 10/9/2019 5:12 PM by Dr. Nella Enriquez MD.             Results for orders placed during the hospital encounter of 05/18/19   Adult Transthoracic Echo Complete W/ Cont if Necessary Per Protocol    Narrative · Estimated EF = 60%.  · Mild mitral valve regurgitation is present  · Mild tricuspid valve regurgitation is present.  · Calculated right ventricular systolic pressure from tricuspid   regurgitation is 29 mmHg.           I have reviewed the medications:  Scheduled Meds:    amLODIPine 10 mg Oral Q24H   DULoxetine 30 mg Oral Daily   insulin detemir 20 Units Subcutaneous Q12H   insulin lispro 0-9 Units Subcutaneous 4x Daily With Meals & Nightly   meropenem 1 g Intravenous Q8H   metoprolol tartrate 100 mg Oral Q12H   sevelamer 800 mg Oral TID With Meals   sodium chloride 10 mL Intravenous Q12H   terazosin 5 mg Oral Nightly     Continuous Infusions:    sodium chloride 100 mL/hr Last Rate: 100 mL/hr (10/09/19 0509)     PRN Meds:.•  acetaminophen **OR** acetaminophen **OR** acetaminophen  •  dextrose  •  dextrose  •  glucagon (human recombinant)  •  HYDROcodone-acetaminophen  •  LORazepam  •  melatonin  •  ondansetron  •  sodium chloride      Assessment/Plan   Assessment / Plan     Active Hospital Problems    Diagnosis  POA   • **Perinephric abscess [N15.1]  Yes   • Bacteremia due to Klebsiella pneumoniae [R78.81]  Unknown     Priority: High   • Acute UTI (urinary tract infection) [N39.0]  Yes   • Gastroparesis [K31.84]  Yes   • S/P BKA (below knee amputation), left (CMS/HCC) [Z89.512]  Not Applicable   • Type 2 diabetes mellitus with circulatory disorder and uncontrolled [E11.59]  Yes   • DUNLAP Cirrhosis [K74.60]  Yes   • Essential hypertension [I10]  Yes   • Hyperlipemia [E78.5]  Yes   • Non-compliance [Z91.14]  Not Applicable      Resolved Hospital Problems   No resolved problems to display.        Brief Hospital Course to date:  Kendrick Crystal is a 49 y.o. male with PMHx significant for uncontrolled DMII with neuropathy, HLD, HTN, DUNLAP cirrhosis, PVD s/p L BKA, BPH and recent admission for sepsis related to perirectal abscess with fistula formation. He underwent I&D with Dr. Payne and was followed by infectious disease. He presented on 10/8 with complaints of right flank pain, was found to be septic with right perinephric abscess.    Plan:    Sepsis secondary to Right Perinephritic abscess and UTI  Klebsiella pneumoniae  bacteremia  - Merrem per Dr. Dong  - continue IVFs  -- await susceptibilities on blood cultures, ID consulted (Dr. Dong)  - urology, Dr. Marks following  --s/p IR drainage of abscess.  Cultures PENDING but appears to be GNR.   - tylenol PRN for fever     Uncontrolled DM2 with Hyperglycemia  - A1c 14.0, he says his blood sugar runs >400 at baseline  - medical non compliance. Currently hyperglycemic. Not in DKA as there is no acidosis present  - initially started on insulin gtt in the ED, has since been discontinued  - on lantus 30 units BID and humalog 5 units TID. Decreased to 20 units BID and add moderate SSI for now  - diabetic educator and nutrition consulted     DUNLAP cirrhosis:  - stable. Avoid hepatotoxins     HTN:  - controlled and stable. Continue home meds      Hyponatremia:  - corrected sodium for glucose is 130, suspect hypovolemic hyponatremia related to decreased PO intake   - better this am.  Continue to monitor, consider NAL consult if worsens.       DVT Prophylaxis:  mechanical    Disposition: I expect the patient to be discharged TBD.    CODE STATUS:   Code Status and Medical Interventions:   Ordered at: 10/08/19 1652     Level Of Support Discussed With:    Patient     Code Status:    CPR     Medical Interventions (Level of Support Prior to Arrest):    Full         Electronically signed by Latha Lenz MD, 10/10/19, 8:20 AM.

## 2019-10-10 NOTE — PLAN OF CARE
Problem: Fall Risk (Adult)  Goal: Identify Related Risk Factors and Signs and Symptoms  Outcome: Ongoing (interventions implemented as appropriate)   10/10/19 0549   Fall Risk (Adult)   Related Risk Factors (Fall Risk) environment unfamiliar   Signs and Symptoms (Fall Risk) presence of risk factors      10/10/19 0549   Fall Risk (Adult)   Related Risk Factors (Fall Risk) environment unfamiliar   Signs and Symptoms (Fall Risk) presence of risk factors     Goal: Absence of Fall  Outcome: Ongoing (interventions implemented as appropriate)   10/10/19 0549   Fall Risk (Adult)   Absence of Fall making progress toward outcome       Problem: Skin Injury Risk (Adult)  Goal: Identify Related Risk Factors and Signs and Symptoms  Outcome: Ongoing (interventions implemented as appropriate)   10/09/19 0340   Skin Injury Risk (Adult)   Related Risk Factors (Skin Injury Risk) infection;mobility impaired;nutritional deficiencies     Goal: Skin Health and Integrity  Outcome: Ongoing (interventions implemented as appropriate)   10/10/19 0549   Skin Injury Risk (Adult)   Skin Health and Integrity making progress toward outcome       Problem: Pain, Acute (Adult)  Goal: Identify Related Risk Factors and Signs and Symptoms  Outcome: Ongoing (interventions implemented as appropriate)   10/10/19 0549   Pain, Acute (Adult)   Related Risk Factors (Acute Pain) disease process;infection;surgery   Signs and Symptoms (Acute Pain) social withdrawal;fatigue/weakness;BADLs/IADLs reluctance/inability to perform;alteration in muscle tone     Goal: Acceptable Pain Control/Comfort Level  Outcome: Ongoing (interventions implemented as appropriate)   10/10/19 0549   Pain, Acute (Adult)   Acceptable Pain Control/Comfort Level making progress toward outcome       Problem: Infection, Risk/Actual (Adult)  Goal: Identify Related Risk Factors and Signs and Symptoms  Outcome: Ongoing (interventions implemented as appropriate)   10/10/19 0549   Infection,  Risk/Actual (Adult)   Related Risk Factors (Infection, Risk/Actual) skin integrity impairment;immunosuppressed;chronic illness/condition   Signs and Symptoms (Infection, Risk/Actual) blood glucose changes;cultures positive;body temperature changes

## 2019-10-10 NOTE — PROGRESS NOTES
[facility] Progress Note        Antibiotics:  Anti-Infectives (From admission, onward)    Ordered     Dose/Rate Route Frequency Start Stop    10/09/19 1136  meropenem (MERREM) 1 g/100 mL 0.9% NS VTB (mbp)     Ordering Provider:  Usman Dong MD    1 g  over 3 Hours Intravenous Every 8 Hours 10/09/19 1800 10/19/19 1759    10/09/19 1136  meropenem (MERREM) 1 g/100 mL 0.9% NS VTB (mbp)     Ordering Provider:  Usman Dong MD    1 g  over 30 Minutes Intravenous Once 10/09/19 1230 10/09/19 1251    10/08/19 2338  piperacillin-tazobactam (ZOSYN) 3.375 g in iso-osmotic dextrose 50 ml (premix)     Ordering Provider:  Yasmany Martinez PA-C    3.375 g  over 30 Minutes Intravenous Once 10/08/19 2340 10/09/19 0016          CC: fatigue    HPI:  Patient is a 49 y.o.  Yr old male with history of poorly contolled T2DM, DUNLAP/liver cirrhosis, HTN, PVD/Left BKA, and multiple skin abscesses/MRSA who presented to [facility] ED with right shin wound infection summer 2018.  He was unable to get to see Dr. Martinez as his father passed away unexpectedly on 18 and he had to wait until after the .  The wound worsened becoming gangrenous and he came to [facility] ED in 2018.  He also had been hospitalized at [facility] and then transferred to  for a severe penis/scrotum infection requiring surgical debridement in summer 2018.  He received antibiotics regarding his right leg infection, generally improved over the remainder of summer 2018 but that area had not completely healed. He was admitted 2019 with acute left lower abdominal wall redness/swelling and pain, spontaneous drainage associated with fever/chills and uncontrolled blood sugars.   I&D requiring wide debridement , severe/deep infection and transferred to [facility] on May 3 with IV Zosyn/oral doxycycline. Cultures had lactobacillus and mixed gram-positive skin sherly including alpha strep, staph epidermidis and  corynebacterium. Readmitted to [facility] May 18, 2019, increasing generalized edema ultimately transitioned to oral doxycycline and healed.     He presented to the emergency room July 30, 2019 with acute rectal pain that had begun 7/27; this is associated with constipation for days, chills and nausea/dry heaving.  White blood cell count elevated, high hemoglobin A1c over 13, urinalysis with hematuria/pyuria and CT scan with 3 x 3 cm fluid collection anterior to the distal rectum and per discussion with Dr. Akbar/Jennifer, this is not in the prostate.  Colorectal surgery/urology saw him for consideration of surgical options/timing/threshold, etc..     8/2/19 I&Dper Dr Payne perirectal abscess; patient reports that he had instrumented his rectum prior to hospitalization with enema     8/3/19 urinary retention overnight requiring in/out catheterization per patient.  Creat climb     8/4/19 further creat climb; childs placed and lisinopril stopped and d/w Dr Rubio;  ?obstruction initially associated with retention postop (1200 out with I/O cath postop per nursing) -v- contrast from CT -v- lisinopril/medication/vancomycin -v- relative hypotension -v- likely combination of factors with ATN; nephrology following; vancomycin trough high and vancomycin stopped empirically 8/3 although high trough potentially accumulation as a consequence of diminishing renal function associated with retention/contrast/pressure rather than cause.  Nonetheless, avoid for now; creatinine trending down.        8/7 in retrospect he remembers air in his urine at admit which has raised concern for possible fistula from urinary tract;  No fecaluria and culture from abscess is fecal sherly;  ?rectal-urethral fistula;  Dr Payne aware     Culture with E. coli/Klebsiella species and creatinine generally trended down, transitioned to oral antibiotics at discharge.     Readmitted August 17, 2019 with nausea/vomiting/dry heaves for 3 days.  Childs  "catheter was placed and CT scan of the abdomen showed:      \"Previously seen fluid collection identified inferior to the prostate gland is more increased in density on today's examination. There is wall thickening again seen throughout the bladder. Findings again are concerning for cystitis.\" per radiology     He was transitioned to oral omnicef/topical antifungal on approx 8/23/19; Noncompliant with f/u in our office     READMITTED 10/8/19 RLQ abd pain, urinary retention, nausea, dysuria and generalized fatigue     UTI by U/A, subsequent blood cultures positive for GNR and initial PCR with kleb sp, no carbapenem resistance by initial PCR per my d/w micro;  Abnormal CT abd with right perinephric fluid collection, advanced cirrhosis, urinary bladder thickening.  10/9 Aspirate of perinephric fluid collection consistent with abscess/gram-negative lon and white blood cells     10/10/19 generally fatigued.  He currently denies abd pain;  Walker in and no overt hematuria or pyuria;  He denied any  fecaluria prior to admit to me.  He had complained of air in his urine to Dr. Payne     No headache photophobia or neck stiffness.  No shortness of breath cough or hemoptysis.  No diarrhea.  No hematochezia melena or hematemesis.  No skin rash.     ROS:      10/10/19 No f/c/s. No n/v/d. No rash. No new ADR to Abx.     Constitutional-- No Fever, chills or sweats.  Appetite diminished with fatigue  Heent-- No new vision, hearing or throat complaints.  No epistaxis or oral sores.  Denies odynophagia or dysphagia.  No flashers, floaters or eye pain. No odynophagia or dysphagia. No headache, photophobia or neck stiffness.  CV-- No chest pain, palpitation or syncope  Resp-- No SOB/cough/Hemoptysis  GI- No  diarrhea.  No hematochezia, melena, or hematemesis. Denies jaundice or chronic liver disease.  -- No dysuria, hematuria, or flank pain.  Denies hesitancy, urgency or flank pain.  Lymph- no swollen lymph nodes in neck/axilla or " "groin.   Heme- No active bruising or bleeding; no Hx of DVT or PE.  MS-- no swelling or pain in the bones or joints of arms/legs.  No new back pain.  Neuro-- No acute focal weakness or numbness in the arms or legs.  No seizures.     Full 12 point review of systems reviewed and negative otherwise for acute complaints, except for above      PE:   BP 95/59 (BP Location: Left arm, Patient Position: Sitting)   Pulse 75   Temp 98.1 °F (36.7 °C) (Oral)   Resp 16   Ht 190.5 cm (75\")   Wt 112 kg (247 lb 9.6 oz)   SpO2 98%   BMI 30.95 kg/m²     GENERAL: awake, in no acute distress.   HEENT: Normocephalic, atraumatic.  PERRL. EOMI. No conjunctival injection. No icterus. Oropharynx clear without evidence of thrush or exudate. No evidence of peridontal disease.    NECK: Supple without nuchal rigidity. No mass.  LYMPH: No cervical, axillary or inguinal lymphadenopathy.  HEART: RRR; No murmur, rubs, gallops.   LUNGS: Diminished at bases bilaterally without wheezing, rales, rhonchi. Normal respiratory effort. Nonlabored. No dullness.  ABDOMEN: Soft, nontender, nondistended. Positive bowel sounds. No rebound or guarding. NO mass or HSM.  EXT:  No cyanosis, clubbing or edema. No cord.  : +childs    MSK: FROM without joint effusions noted arms/legs.    SKIN: Warm and dry without cutaneous eruptions on Inspection/palpation.    NEURO: awake     Amputee status noted with left BKA     No CVA tenderness     No peripheral stigmata/phenomena of endocarditis    Laboratory Data    Results from last 7 days   Lab Units 10/10/19  0444 10/09/19  0331 10/08/19  2008   WBC 10*3/mm3 25.20* 29.99* 30.92*   HEMOGLOBIN g/dL 9.6* 10.2* 11.4*   HEMATOCRIT % 29.7* 31.2* 34.6*   PLATELETS 10*3/mm3 208 235 257     Results from last 7 days   Lab Units 10/10/19  0444   SODIUM mmol/L 129*   POTASSIUM mmol/L 3.6   CHLORIDE mmol/L 92*   CO2 mmol/L 24.0   BUN mg/dL 25*   CREATININE mg/dL 0.73*   GLUCOSE mg/dL 170*   CALCIUM mg/dL 8.1*     Results from last " 7 days   Lab Units 10/08/19  2008   ALK PHOS U/L 207*   BILIRUBIN mg/dL 0.4   ALT (SGPT) U/L 10   AST (SGOT) U/L 9               Estimated Creatinine Clearance: 161.7 mL/min (A) (by C-G formula based on SCr of 0.73 mg/dL (L)).      Microbiology:      Radiology:  Imaging Results (last 72 hours)     Procedure Component Value Units Date/Time    CT Guided Abscess Drain Retroperitoneal [[id number]] Collected:  10/09/19 1611     Updated:  10/09/19 1715    Narrative:       EXAMINATION: CT GUIDED ABSCESS DRAIN RETROPERITONEAL- 10/09/2019     INDICATION: N15.1-Renal and perinephric abscess; A41.9-Sepsis,  unspecified organism; E86.0-Dehydration; E11.65-Type 2 diabetes mellitus  with hyperglycemia; I10-Essential (primary) hypertension;  Z86.14-Personal history of Methicillin resistant Staphylococcus aureus  infection; K74.60-Unspecified cirrhosis of liver; fluid collection     TECHNIQUE: CT-guided drainage of a perinephric fluid collection     The radiation dose reduction device was turned on for each scan per the  ALARA (As Low as Reasonably Achievable) protocol.     COMPARISON: NONE     FINDINGS: Patient referred for CT-guided drainage of a perinephric fluid  collection. Procedure and risks were explained to the patient. Informed  consent was obtained and signed. Patient placed in the left lateral  position upon the table. The right flank was prepped and draped in a  sterile fashion. 1% lidocaine used as a local anesthetic. Under CT  fluoroscopic guidance an 18-gauge 15 cm Chiba needle was advanced into  the perinephric fluid collection of approximately 10 ml of a reddish  fluid was removed with some debris within the fluid. There is a striated  delayed nephrogram seen of the right kidney suggesting possible  pyelonephritis with surrounding stranding of the perinephric fat.  Myelolipoma seen on the adrenal gland on the left. Chiba needle was  removed and no immediate complication occurred. Slight decrease seen in  size of  the perinephric fluid collection status post drainage.       Impression:       CT-guided drainage of a perinephric fluid collection with no  immediate complication.     D:  10/09/2019  E:  10/09/2019     This report was finalized on 10/9/2019 5:12 PM by Dr. Nella Enriquez MD.       CT Abdomen Pelvis With Contrast [[id number]] Collected:  10/08/19 2202     Updated:  10/08/19 2222    Narrative:       CT ABDOMEN AND PELVIS WITH CONTRAST, 10/8/2019    HISTORY:  49-year-old male in the ED complaining of 2 day history right side abdomen pain, nausea, vomiting and generalized weakness. History of hepatic cirrhosis (DUNLAP). History of diabetes with left BKA and multiple abdominal wall abscesses and lower extremity  cellulitis in 2018.    TECHNIQUE:  CT imaging of the abdomen and pelvis with IV contrast. Radiation dose reduction techniques included automated exposure control. Radiation audit for CT and nuclear cardiology exams in the last 12 months: 7.    COMPARISON:  *  CT abdomen/pelvis, 8/18/2019.    ABDOMEN FINDINGS:  Images show a right lower perinephric fluid collection that is either subcapsular or pericapsular. This extends along the lower kidney for a length of about 8.5 cm and has a maximum thickness of 2.5 cm. The fluid is mixed attenuation and may be  hemorrhagic. Correlate for any recent history of blunt trauma to the flank. Renal parenchyma shows no disruption or abnormal enhancement. There is no fracture of overlying right lower posterior ribs or transverse spinous processes. Given the history,  perinephric abscess is possible but is significantly less likely given normal renal parenchymal enhancement. No evidence of urinary obstruction. No visible nephrolithiasis.    Advanced hepatic cirrhosis. Mild splenomegaly. No suspicious liver lesion. No upper abdominal ascites. Hepatic vasculature appears patent. No gallbladder distention or bile duct dilatation. Negative pancreas.    Benign left adrenal myelolipoma  measuring about 2.9 cm. Normal caliber abdominal aorta. Small bowel and colon are normal in caliber and appearance, as imaged. The appendix is normal. No gastric distention or distal esophageal dilatation.    PELVIS FINDINGS:  Air within the urinary bladder, likely iatrogenic. There is at least mild diffuse bladder wall thickening. Prostate and rectum are unremarkable. No inguinal hernia.    Lung base images show multiple benign calcified granulomas.      Impression:       1.  Small mixed attenuation right perinephric fluid collection along the inferior capsule, not present on the prior exam. Perinephric hematoma is a consideration. Correlate clinically for any history of blunt flank trauma. While abscess is possible, this  is unlikely as right renal parenchyma enhances normally with no evidence of pyelonephritis. Laboratory correlation recommended.  2.  Advanced hepatic cirrhosis. Splenomegaly.  3.  Urinary bladder wall thickening. Gas within the bladder is most likely iatrogenic.  4.  Benign left adrenal myelolipoma.    Signer Name: Fadi Healy MD   Signed: 10/8/2019 10:02 PM   Workstation Name: [facility]    Radiology [facility]    XR Chest PA & Lateral [[id number]] Collected:  10/08/19 2210     Updated:  10/08/19 2212    Narrative:       CHEST X-RAY, 10/8/2019      HISTORY:    49-year-old male in the ED with reported diabetic ketoacidosis.      TECHNIQUE:  AP portable upright chest series.      FINDINGS:  Heart size and pulmonary vascularity are normal. The lungs are expanded and clear. No visible pulmonary infiltrate or pleural effusion. Lower lung benign calcified granulomas.      Impression:       Negative chest.    Signer Name: Fadi Healy MD   Signed: 10/8/2019 10:10 PM   [facility]    Radiology [facility]            Impression:      --Acute GNR septicemia/sepsis, likely urinary source/pyelnoephritis associated with urinary retention and   with abnormal CT scan with perinephric collection/abscess and gram-negative lon on aspirate;  IV merrem ongoing; urology to determine any timing/option or threshold for further intervention or functional/anatomic assessment.     --Hx perirectal abscess ; Colorectal surgery, Dr. Payne previously saw and is following.  Drainage as above on August 2 with mxed Fecal sherly in culture; CT scans  with no mention of abscess on 8/18 study;  Dr Payne  following given urology conern for fistula and to help determine any timing/option or threshold for further GI/colorectal work-up     --History urinary retention.  urology following     --Hx ARF in August 2019;  ?obstruction initially associated with retention postop (1200 out with I/O cath postop per nursing) -v- contrast from CT -v- lisinopril/medication/vancomycin -v- relative hypotension -v- likely combination of factors with ATN;  vancomycin trough high and vancomycin stopped empirically 8/3 although high trough potentially accumulation as a consequence of diminishing renal function associated with retention/contrast/pressure rather than cause.  Nonetheless, avoid for now; likely further acute kidney injury at admission August 17 associated with dehydration/prerenal/ATN etiology     --History MRSA;  Not in current culture     --Diabetes mellitus type 2, uncontrolled.  He reports the reason for this is forgetfulness     --History Johnston and chronic liver disease/cirrhosis  per past notes     --Left BKA     --Peripheral vascular disease     --medical noncompliance    PLAN:     --IV merrem     --I discussed potential risks and benefits of the prescribed antibiotics that include, but are not limited to, solid organ toxicity, neuro toxicity, renal toxicity, CDiff, cytopenias, hypersensitivity,  etc.. Patient/Family voice understanding and agree to proceed.     --Check/review labs cultures and scans     --Discussed with microbiology     --History per nursing staff     --Discussed  with Dr Lenz     --Discussed with family, partial history per them     --Highly complex set of issues with high risk for further serious morbidity and other serious sequela     --Colorectal surgery and urology to follow    --I am out of town until Tuesday.  Partners to cover.    Usman Dong MD  10/10/2019

## 2019-10-10 NOTE — PLAN OF CARE
Problem: Patient Care Overview  Goal: Plan of Care Review  Outcome: Ongoing (interventions implemented as appropriate)   10/10/19 0601   Plan of Care Review   Progress no change   OTHER   Outcome Summary VSS. Patient rested comfortably throughout shift. No complaints of pain or N/V. Will continue to monitor   Coping/Psychosocial   Plan of Care Reviewed With patient;spouse

## 2019-10-10 NOTE — PLAN OF CARE
Problem: Patient Care Overview  Goal: Plan of Care Review  Outcome: Ongoing (interventions implemented as appropriate)   10/10/19 0601 10/10/19 1227   Plan of Care Review   Progress no change --    Coping/Psychosocial   Plan of Care Reviewed With --  patient;spouse     Goal: Discharge Needs Assessment  Outcome: Ongoing (interventions implemented as appropriate)   10/09/19 0218 10/09/19 0341 10/09/19 1055   Discharge Needs Assessment   Readmission Within the Last 30 Days --  no previous admission in last 30 days --    Patient/Family Anticipates Transition to home with family --  --    Patient/Family Anticipated Services at Transition  --  --    Transportation Anticipated family or friend will provide --  --    Equipment Needed After Discharge --  --  none   Disability   Equipment Currently Used at Home --  --  bath bench;wheelchair;prosthesis;wheelchair, motorized;commode;grab bar       Problem: Fall Risk (Adult)  Goal: Identify Related Risk Factors and Signs and Symptoms  Outcome: Outcome(s) achieved Date Met: 10/10/19   10/10/19 0549   Fall Risk (Adult)   Related Risk Factors (Fall Risk) environment unfamiliar   Signs and Symptoms (Fall Risk) presence of risk factors     Goal: Absence of Fall  Outcome: Ongoing (interventions implemented as appropriate)   10/10/19 0549   Fall Risk (Adult)   Absence of Fall making progress toward outcome       Problem: Skin Injury Risk (Adult)  Goal: Identify Related Risk Factors and Signs and Symptoms  Outcome: Outcome(s) achieved Date Met: 10/10/19   10/09/19 0340   Skin Injury Risk (Adult)   Related Risk Factors (Skin Injury Risk) infection;mobility impaired;nutritional deficiencies     Goal: Skin Health and Integrity   10/10/19 1616   Skin Injury Risk (Adult)   Skin Health and Integrity making progress toward outcome       Problem: Pain, Acute (Adult)  Goal: Identify Related Risk Factors and Signs and Symptoms  Outcome: Outcome(s) achieved Date Met: 10/10/19    10/10/19 0549   Pain, Acute (Adult)   Related Risk Factors (Acute Pain) disease process;infection;surgery   Signs and Symptoms (Acute Pain) social withdrawal;fatigue/weakness;BADLs/IADLs reluctance/inability to perform;alteration in muscle tone     Goal: Acceptable Pain Control/Comfort Level  Outcome: Ongoing (interventions implemented as appropriate)   10/10/19 0549   Pain, Acute (Adult)   Acceptable Pain Control/Comfort Level making progress toward outcome       Problem: Infection, Risk/Actual (Adult)  Goal: Identify Related Risk Factors and Signs and Symptoms  Outcome: Ongoing (interventions implemented as appropriate)   10/10/19 0549   Infection, Risk/Actual (Adult)   Related Risk Factors (Infection, Risk/Actual) skin integrity impairment;immunosuppressed;chronic illness/condition   Signs and Symptoms (Infection, Risk/Actual) blood glucose changes;cultures positive;body temperature changes     Goal: Infection Prevention/Resolution  Outcome: Ongoing (interventions implemented as appropriate)   10/10/19 1616   Infection, Risk/Actual (Adult)   Infection Prevention/Resolution making progress toward outcome

## 2019-10-10 NOTE — CONSULTS
Kendrick Crystal     LOS: 2 days   No care team member to display    Chief Complaint: Perinephric abscess          Subjective   History of Present Illness:   The patient is a 49 year-old white male with poorly controlled type 1 diabetes.  The patient has had multiple secondary complications related to his diabetes including blindness in his left eye, nonalcoholic steatohepatitis with cirrhosis, peripheral vascular disease resulting in left below-knee amputation.  He has had several skin infections and an abdominal wall abscess drained by general surgery several months ago.  I saw him on 7/31/2019 because of perineal pain thought secondary to hemorrhoids.  CT scan at that time showed a 3 x 3.2 cm fluid collection with enhancing margins abscess located at the inferior aspect of the prostate gland.  To the distal rectum.  Review with the radiologist at that time did not show any involvement into the rectum.  I ask urology to see the patient because of its proximity to the prostate.  He was evaluated by Dr. Rodriguez who stated he reviewed the findings of the abscess on the CT in relation to lower urologic anatomy.  He felt there is no evidence that this was prostatic or periprosthetic abscess or a urethral abscess and deferred all management of the patient to infectious disease and myself.  Following this evaluation I took the patient to surgery.  He had transrectal drainage of the abscess collection.  Initially after the procedure he had some pneumaturia.  His abscess resolved and the pneumaturia resolved and he was discharged home.  Beginning approximately 2 to 3 weeks ago he again noticed pneumaturia.  He developed fever and general dysphoria.  He has not been able to control his glucose as well since discharge.  This is been a chronic problem.  The patient underwent CT scan in the emergency room yesterday which showed advanced hepatic cirrhosis and splenomegaly, small mixed attenuation right perinephric fluid along the  inferior capsule, urinary bladder wall thickening with gas in the bladder, and benign left adrenal myelolipoma.  There was no evidence of residual abscess in the perirectal area and this area appears to have healed well on CT scan.  The patient subsequently had percutaneous drainage of the perinephric fluid which is now showing 4+ white blood cells and 3+ gram-negative bacilli.  The patient is on Merrem and is feeling better.  His white blood cell count is dropped from 29 to 25.        Past Medical History:   Diagnosis Date   • Abdominal wall cellulitis 5/20/2019   • JUAN (acute kidney injury) (CMS/HCC) 7/29/2018   • Arthritis    • Bipolar 1 disorder (CMS/HCC)    • Cellulitis of right anterior lower leg 7/29/2018   • Cirrhosis (CMS/HCC)    • Counseling for insulin pump    • Depression    • Diabetes mellitus (CMS/HCC)    • Encephalopathy, hepatic (CMS/HCC)    • H/O degenerative disc disease    • History of Lissa's gangrene     right leg/testicle   • Hyperlipemia    • Hypertension    • multiple Skin abscesses    • DULNAP, bx showed stage IV fibrosis    • Neuropathy    • Peripheral vascular disease (CMS/HCC)    • Thrombophlebitis      Past Surgical History:   Procedure Laterality Date   • ABDOMINAL WALL ABSCESS INCISION AND DRAINAGE N/A 4/26/2019    Procedure: ABDOMINAL WALL DEBRIDEMENT;  Surgeon: Fadi Kern MD;  Location:  [facility];  Service: General   • AMPUTATION DIGIT Left 1/30/2017    Procedure: left fourth and fifth transmetatarsal toe amputation ;  Surgeon: Juancho Martinez MD;  Location:  [facility];  Service:    • BELOW KNEE AMPUTATION Left 3/2/2017    Procedure: AMPUTATION BELOW KNEE, SHMUEL;  Surgeon: Juancho Martinez MD;  Location:  [facility];  Service:    • ENDOSCOPY     • HEMORRHOIDECTOMY N/A 8/2/2019    Procedure: EXCISION AND DRAIN PERIRECTAL ABSCESS;  Surgeon: Mumtaz Payne MD;  Location:  [facility];  Service: General   • LEG SURGERY     • TESTICLE SURGERY Right     DEBRIDEMENT FROM GANGRENE   •  TONSILLECTOMY  1975     No current facility-administered medications on file prior to encounter.      Current Outpatient Medications on File Prior to Encounter   Medication Sig Dispense Refill   • amLODIPine (NORVASC) 10 MG tablet Take 1 tablet by mouth Daily. 30 tablet 0   • aspirin 81 MG tablet Take 1 tablet by mouth Daily. 30 tablet 11   • DULoxetine (CYMBALTA) 30 MG capsule Take 1 capsule by mouth Daily. 90 capsule 1   • hydrALAZINE (APRESOLINE) 50 MG tablet      • HYDROcodone-acetaminophen (NORCO)  MG per tablet Take 1 tablet by mouth 3 (Three) Times a Day As Needed for Moderate Pain  or Severe Pain .     • insulin lispro (humaLOG) 100 UNIT/ML injection Inject 5 Units under the skin into the appropriate area as directed 4 (Four) Times a Day With Meals & at Bedtime. Plus sliding scale 60 mL 5   • LANTUS 100 UNIT/ML injection Inject 30 Units under the skin into the appropriate area as directed 2 (Two) Times a Day. 60 mL 1   • metoprolol tartrate (LOPRESSOR) 100 MG tablet Take 100 mg by mouth Every 12 (Twelve) Hours.     • nystatin (MYCOSTATIN) 629296 UNIT/GM cream Apply  topically to the appropriate area as directed 2 (Two) Times a Day. 30 g 0   • oxyCODONE (ROXICODONE) 10 MG tablet      • promethazine (PHENERGAN) 12.5 MG tablet Take 1 tablet by mouth Every 6 (Six) Hours As Needed for Nausea or Vomiting. 20 tablet 0   • sevelamer (RENVELA) 0.8 g pack packet Take 1 packet by mouth 3 (Three) Times a Day With Meals. 90 packet 0   • tamsulosin (FLOMAX) 0.4 MG capsule 24 hr capsule Take 2 capsules by mouth Every Night. 180 capsule 1   • terazosin (HYTRIN) 5 MG capsule Take 1 capsule by mouth Every Night. 30 capsule 0     No Known Allergies  Family History   Problem Relation Age of Onset   • Arthritis Mother    • Hypertension Mother    • Kidney disease Mother    • Stroke Mother    • Heart attack Father    • Arthritis Father    • Diabetes Father    • Hyperlipidemia Father    • Hypertension Father    • Mental  "illness Sister    • Diabetes Brother    • Hypertension Brother    • Obesity Brother      Social History     Socioeconomic History   • Marital status:      Spouse name: Not on file   • Number of children: 1   • Years of education: Not on file   • Highest education level: Not on file   Occupational History   • Occupation: Security     Comment: Disabled-Diabetic Retinopathy   Tobacco Use   • Smoking status: Never Smoker   • Smokeless tobacco: Never Used   Substance and Sexual Activity   • Alcohol use: No   • Drug use: No   • Sexual activity: Defer   Social History Narrative    Lives in [facility]       Review of Systems: See history and physical for full review of systems   Objective     Vital Signs  Blood pressure 93/68, pulse 77, temperature 98.4 °F (36.9 °C), temperature source Oral, resp. rate 18, height 190.5 cm (75\"), weight 112 kg (247 lb 9.6 oz), SpO2 92 %.    Physical Exam:  Constitutional: No acute distress, awake, alert  Eyes: PERRLA,  no conjunctival injection, blind in left eye  HENT: NCAT, mucous membranes moist  Neck: Supple, no thyromegaly, no lymphadenopathy, trachea midline  Respiratory: Clear to auscultation bilaterally, nonlabored respirations   Cardiovascular: RRR, no murmurs, rubs, or gallops, Gastrointestinal: Positive bowel sounds, soft, nontender, nondistended.  No lesions on the abdominal wall  Musculoskeletal: The patient is status post left BKA   Psychiatric: Appropriate affect, cooperative  Neurologic: Oriented x 3, strength symmetric in all extremities, speech clear  Skin: No rashes    Results Review:       WBC WBC   Date Value Ref Range Status   10/10/2019 25.20 (H) 3.40 - 10.80 10*3/mm3 Final   10/09/2019 29.99 (C) 3.40 - 10.80 10*3/mm3 Final   10/08/2019 30.92 (C) 3.40 - 10.80 10*3/mm3 Final      HGB Hemoglobin   Date Value Ref Range Status   10/10/2019 9.6 (L) 13.0 - 17.7 g/dL Final   10/09/2019 10.2 (L) 13.0 - 17.7 g/dL Final   10/08/2019 11.4 (L) 13.0 - 17.7 g/dL Final    "   HCT Hematocrit   Date Value Ref Range Status   10/10/2019 29.7 (L) 37.5 - 51.0 % Final   10/09/2019 31.2 (L) 37.5 - 51.0 % Final   10/08/2019 34.6 (L) 37.5 - 51.0 % Final      PLT           Results from last 7 days   Lab Units 10/10/19  0444   PLATELETS 10*3/mm3 208            Sodium Sodium   Date Value Ref Range Status   10/09/2019 125 (L) 136 - 145 mmol/L Final   10/08/2019 124 (L) 136 - 145 mmol/L Final      Potassium Potassium   Date Value Ref Range Status   10/09/2019 3.6 3.5 - 5.2 mmol/L Final   10/08/2019 4.3 3.5 - 5.2 mmol/L Final      Chloride Chloride   Date Value Ref Range Status   10/09/2019 88 (L) 98 - 107 mmol/L Final   10/08/2019 84 (L) 98 - 107 mmol/L Final      Bicarbonate No results found for: PLASMABICARB   BUN BUN   Date Value Ref Range Status   10/09/2019 29 (H) 6 - 20 mg/dL Final   10/08/2019 33 (H) 6 - 20 mg/dL Final      Creatinine Creatinine   Date Value Ref Range Status   10/09/2019 0.75 (L) 0.76 - 1.27 mg/dL Final   10/08/2019 0.85 0.76 - 1.27 mg/dL Final                                      Imaging Results (last 24 hours)     Procedure Component Value Units Date/Time    CT Guided Abscess Drain Retroperitoneal [[id number]] Collected:  10/09/19 1611     Updated:  10/09/19 1715    Narrative:       EXAMINATION: CT GUIDED ABSCESS DRAIN RETROPERITONEAL- 10/09/2019     INDICATION: N15.1-Renal and perinephric abscess; A41.9-Sepsis,  unspecified organism; E86.0-Dehydration; E11.65-Type 2 diabetes mellitus  with hyperglycemia; I10-Essential (primary) hypertension;  Z86.14-Personal history of Methicillin resistant Staphylococcus aureus  infection; K74.60-Unspecified cirrhosis of liver; fluid collection     TECHNIQUE: CT-guided drainage of a perinephric fluid collection     The radiation dose reduction device was turned on for each scan per the  ALARA (As Low as Reasonably Achievable) protocol.     COMPARISON: NONE     FINDINGS: Patient referred for CT-guided drainage of a perinephric  fluid  collection. Procedure and risks were explained to the patient. Informed  consent was obtained and signed. Patient placed in the left lateral  position upon the table. The right flank was prepped and draped in a  sterile fashion. 1% lidocaine used as a local anesthetic. Under CT  fluoroscopic guidance an 18-gauge 15 cm Chiba needle was advanced into  the perinephric fluid collection of approximately 10 ml of a reddish  fluid was removed with some debris within the fluid. There is a striated  delayed nephrogram seen of the right kidney suggesting possible  pyelonephritis with surrounding stranding of the perinephric fat.  Myelolipoma seen on the adrenal gland on the left. Chiba needle was  removed and no immediate complication occurred. Slight decrease seen in  size of the perinephric fluid collection status post drainage.       Impression:       CT-guided drainage of a perinephric fluid collection with no  immediate complication.     D:  10/09/2019  E:  10/09/2019     This report was finalized on 10/9/2019 5:12 PM by Dr. Nella Enriquez MD.               Assessment/Plan     The patient has a perinephric abscess that is now been drained.  He also has had recurrent pneumaturia.  By CT scan his perirectal abscess has resolved.  He does not have any residual collection that needs to be drained.  Will defer to urology for possible cystoscopy to help evaluate the source of the pneumaturia.  Will follow along.  Certainly this is not a patient who would tolerate major surgery well.  If he does have a fistula from the bowel to the bladder conservative therapy would need to be one of the considerations.    Mumtaz Payne MD  10/10/19  6:43 AM

## 2019-10-11 ENCOUNTER — APPOINTMENT (OUTPATIENT)
Dept: CARDIOLOGY | Facility: HOSPITAL | Age: 49
End: 2019-10-11

## 2019-10-11 LAB
ANION GAP SERPL CALCULATED.3IONS-SCNC: 11 MMOL/L (ref 5–15)
BH CV LOWER VASCULAR LEFT COMMON FEMORAL AUGMENT: NORMAL
BH CV LOWER VASCULAR LEFT COMMON FEMORAL COMPRESS: NORMAL
BH CV LOWER VASCULAR LEFT COMMON FEMORAL PHASIC: NORMAL
BH CV LOWER VASCULAR LEFT COMMON FEMORAL SPONT: NORMAL
BH CV LOWER VASCULAR RIGHT COMMON FEMORAL AUGMENT: NORMAL
BH CV LOWER VASCULAR RIGHT COMMON FEMORAL COMPRESS: NORMAL
BH CV LOWER VASCULAR RIGHT COMMON FEMORAL PHASIC: NORMAL
BH CV LOWER VASCULAR RIGHT COMMON FEMORAL SPONT: NORMAL
BH CV LOWER VASCULAR RIGHT DISTAL FEMORAL AUGMENT: NORMAL
BH CV LOWER VASCULAR RIGHT DISTAL FEMORAL COMPRESS: NORMAL
BH CV LOWER VASCULAR RIGHT DISTAL FEMORAL PHASIC: NORMAL
BH CV LOWER VASCULAR RIGHT DISTAL FEMORAL SPONT: NORMAL
BH CV LOWER VASCULAR RIGHT GASTRONEMIUS COMPRESS: NORMAL
BH CV LOWER VASCULAR RIGHT GREATER SAPH AK COMPRESS: NORMAL
BH CV LOWER VASCULAR RIGHT GREATER SAPH BK COMPRESS: NORMAL
BH CV LOWER VASCULAR RIGHT LESSER SAPH COMPRESS: NORMAL
BH CV LOWER VASCULAR RIGHT MID FEMORAL AUGMENT: NORMAL
BH CV LOWER VASCULAR RIGHT MID FEMORAL COMPRESS: NORMAL
BH CV LOWER VASCULAR RIGHT MID FEMORAL PHASIC: NORMAL
BH CV LOWER VASCULAR RIGHT MID FEMORAL SPONT: NORMAL
BH CV LOWER VASCULAR RIGHT PERONEAL COMPRESS: NORMAL
BH CV LOWER VASCULAR RIGHT POPLITEAL AUGMENT: NORMAL
BH CV LOWER VASCULAR RIGHT POPLITEAL COMPRESS: NORMAL
BH CV LOWER VASCULAR RIGHT POPLITEAL PHASIC: NORMAL
BH CV LOWER VASCULAR RIGHT POPLITEAL SPONT: NORMAL
BH CV LOWER VASCULAR RIGHT POSTERIOR TIBIAL COMPRESS: NORMAL
BH CV LOWER VASCULAR RIGHT PROFUNDA FEMORAL AUGMENT: NORMAL
BH CV LOWER VASCULAR RIGHT PROFUNDA FEMORAL COMPRESS: NORMAL
BH CV LOWER VASCULAR RIGHT PROFUNDA FEMORAL PHASIC: NORMAL
BH CV LOWER VASCULAR RIGHT PROFUNDA FEMORAL SPONT: NORMAL
BH CV LOWER VASCULAR RIGHT PROXIMAL FEMORAL AUGMENT: NORMAL
BH CV LOWER VASCULAR RIGHT PROXIMAL FEMORAL COMPRESS: NORMAL
BH CV LOWER VASCULAR RIGHT PROXIMAL FEMORAL PHASIC: NORMAL
BH CV LOWER VASCULAR RIGHT PROXIMAL FEMORAL SPONT: NORMAL
BH CV LOWER VASCULAR RIGHT SAPHENOFEMORAL JUNCTION AUGMENT: NORMAL
BH CV LOWER VASCULAR RIGHT SAPHENOFEMORAL JUNCTION COMPRESS: NORMAL
BH CV LOWER VASCULAR RIGHT SAPHENOFEMORAL JUNCTION PHASIC: NORMAL
BH CV LOWER VASCULAR RIGHT SAPHENOFEMORAL JUNCTION SPONT: NORMAL
BUN BLD-MCNC: 19 MG/DL (ref 6–20)
BUN/CREAT SERPL: 35.8 (ref 7–25)
CALCIUM SPEC-SCNC: 7.8 MG/DL (ref 8.6–10.5)
CHLORIDE SERPL-SCNC: 93 MMOL/L (ref 98–107)
CO2 SERPL-SCNC: 24 MMOL/L (ref 22–29)
CREAT BLD-MCNC: 0.53 MG/DL (ref 0.76–1.27)
DEPRECATED RDW RBC AUTO: 44.6 FL (ref 37–54)
ERYTHROCYTE [DISTWIDTH] IN BLOOD BY AUTOMATED COUNT: 14.5 % (ref 12.3–15.4)
GFR SERPL CREATININE-BSD FRML MDRD: >150 ML/MIN/1.73
GLUCOSE BLD-MCNC: 252 MG/DL (ref 65–99)
GLUCOSE BLDC GLUCOMTR-MCNC: 170 MG/DL (ref 70–130)
GLUCOSE BLDC GLUCOMTR-MCNC: 211 MG/DL (ref 70–130)
GLUCOSE BLDC GLUCOMTR-MCNC: 268 MG/DL (ref 70–130)
GLUCOSE BLDC GLUCOMTR-MCNC: 301 MG/DL (ref 70–130)
HCT VFR BLD AUTO: 28.5 % (ref 37.5–51)
HGB BLD-MCNC: 9.4 G/DL (ref 13–17.7)
MCH RBC QN AUTO: 27.7 PG (ref 26.6–33)
MCHC RBC AUTO-ENTMCNC: 33 G/DL (ref 31.5–35.7)
MCV RBC AUTO: 84.1 FL (ref 79–97)
PLATELET # BLD AUTO: 183 10*3/MM3 (ref 140–450)
PMV BLD AUTO: 12.8 FL (ref 6–12)
POTASSIUM BLD-SCNC: 3.5 MMOL/L (ref 3.5–5.2)
POTASSIUM BLD-SCNC: 3.8 MMOL/L (ref 3.5–5.2)
RBC # BLD AUTO: 3.39 10*6/MM3 (ref 4.14–5.8)
SODIUM BLD-SCNC: 128 MMOL/L (ref 136–145)
WBC NRBC COR # BLD: 17.49 10*3/MM3 (ref 3.4–10.8)

## 2019-10-11 PROCEDURE — 82962 GLUCOSE BLOOD TEST: CPT

## 2019-10-11 PROCEDURE — 93971 EXTREMITY STUDY: CPT

## 2019-10-11 PROCEDURE — 63710000001 INSULIN DETEMIR PER 5 UNITS: Performed by: PHYSICIAN ASSISTANT

## 2019-10-11 PROCEDURE — 85027 COMPLETE CBC AUTOMATED: CPT | Performed by: INTERNAL MEDICINE

## 2019-10-11 PROCEDURE — 63710000001 INSULIN LISPRO (HUMAN) PER 5 UNITS: Performed by: INTERNAL MEDICINE

## 2019-10-11 PROCEDURE — 84132 ASSAY OF SERUM POTASSIUM: CPT | Performed by: INTERNAL MEDICINE

## 2019-10-11 PROCEDURE — 97163 PT EVAL HIGH COMPLEX 45 MIN: CPT

## 2019-10-11 PROCEDURE — 93971 EXTREMITY STUDY: CPT | Performed by: INTERNAL MEDICINE

## 2019-10-11 PROCEDURE — 99232 SBSQ HOSP IP/OBS MODERATE 35: CPT | Performed by: INTERNAL MEDICINE

## 2019-10-11 PROCEDURE — 25010000002 MEROPENEM PER 100 MG: Performed by: INTERNAL MEDICINE

## 2019-10-11 PROCEDURE — 97166 OT EVAL MOD COMPLEX 45 MIN: CPT

## 2019-10-11 PROCEDURE — 80048 BASIC METABOLIC PNL TOTAL CA: CPT | Performed by: INTERNAL MEDICINE

## 2019-10-11 PROCEDURE — 63710000001 INSULIN LISPRO (HUMAN) PER 5 UNITS: Performed by: PHYSICIAN ASSISTANT

## 2019-10-11 RX ORDER — SEVELAMER HYDROCHLORIDE 800 MG/1
800 TABLET, FILM COATED ORAL
Status: DISCONTINUED | OUTPATIENT
Start: 2019-10-11 | End: 2019-10-23 | Stop reason: HOSPADM

## 2019-10-11 RX ORDER — POTASSIUM CHLORIDE 1.5 G/1.77G
40 POWDER, FOR SOLUTION ORAL AS NEEDED
Status: DISCONTINUED | OUTPATIENT
Start: 2019-10-11 | End: 2019-10-23 | Stop reason: HOSPADM

## 2019-10-11 RX ORDER — POTASSIUM CHLORIDE 7.45 MG/ML
10 INJECTION INTRAVENOUS
Status: DISCONTINUED | OUTPATIENT
Start: 2019-10-11 | End: 2019-10-23 | Stop reason: HOSPADM

## 2019-10-11 RX ORDER — POTASSIUM CHLORIDE 750 MG/1
40 CAPSULE, EXTENDED RELEASE ORAL AS NEEDED
Status: DISCONTINUED | OUTPATIENT
Start: 2019-10-11 | End: 2019-10-23 | Stop reason: HOSPADM

## 2019-10-11 RX ADMIN — SODIUM CHLORIDE 100 ML/HR: 9 INJECTION, SOLUTION INTRAVENOUS at 14:26

## 2019-10-11 RX ADMIN — MEROPENEM 1 G: 1 INJECTION INTRAVENOUS at 11:03

## 2019-10-11 RX ADMIN — METOPROLOL TARTRATE 100 MG: 100 TABLET ORAL at 23:05

## 2019-10-11 RX ADMIN — DULOXETINE HYDROCHLORIDE 30 MG: 30 CAPSULE, DELAYED RELEASE ORAL at 08:09

## 2019-10-11 RX ADMIN — INSULIN LISPRO 6 UNITS: 100 INJECTION, SOLUTION INTRAVENOUS; SUBCUTANEOUS at 08:07

## 2019-10-11 RX ADMIN — INSULIN LISPRO 5 UNITS: 100 INJECTION, SOLUTION INTRAVENOUS; SUBCUTANEOUS at 12:36

## 2019-10-11 RX ADMIN — RENAGEL 800 MG: 800 TABLET ORAL at 17:59

## 2019-10-11 RX ADMIN — POTASSIUM CHLORIDE 40 MEQ: 750 CAPSULE, EXTENDED RELEASE ORAL at 18:07

## 2019-10-11 RX ADMIN — INSULIN LISPRO 5 UNITS: 100 INJECTION, SOLUTION INTRAVENOUS; SUBCUTANEOUS at 18:01

## 2019-10-11 RX ADMIN — MEROPENEM 1 G: 1 INJECTION INTRAVENOUS at 02:09

## 2019-10-11 RX ADMIN — SODIUM CHLORIDE 100 ML/HR: 9 INJECTION, SOLUTION INTRAVENOUS at 01:13

## 2019-10-11 RX ADMIN — METOPROLOL TARTRATE 100 MG: 100 TABLET ORAL at 08:09

## 2019-10-11 RX ADMIN — MEROPENEM 1 G: 1 INJECTION INTRAVENOUS at 17:59

## 2019-10-11 RX ADMIN — INSULIN DETEMIR 20 UNITS: 100 INJECTION, SOLUTION SUBCUTANEOUS at 23:07

## 2019-10-11 RX ADMIN — INSULIN LISPRO 2 UNITS: 100 INJECTION, SOLUTION INTRAVENOUS; SUBCUTANEOUS at 23:06

## 2019-10-11 RX ADMIN — AMLODIPINE BESYLATE 10 MG: 10 TABLET ORAL at 08:09

## 2019-10-11 RX ADMIN — INSULIN LISPRO 7 UNITS: 100 INJECTION, SOLUTION INTRAVENOUS; SUBCUTANEOUS at 12:36

## 2019-10-11 RX ADMIN — RENAGEL 800 MG: 800 TABLET ORAL at 12:35

## 2019-10-11 RX ADMIN — POTASSIUM CHLORIDE 40 MEQ: 750 CAPSULE, EXTENDED RELEASE ORAL at 12:35

## 2019-10-11 RX ADMIN — INSULIN DETEMIR 20 UNITS: 100 INJECTION, SOLUTION SUBCUTANEOUS at 08:10

## 2019-10-11 RX ADMIN — TERAZOSIN HYDROCHLORIDE ANHYDROUS 5 MG: 5 CAPSULE ORAL at 23:05

## 2019-10-11 RX ADMIN — INSULIN LISPRO 4 UNITS: 100 INJECTION, SOLUTION INTRAVENOUS; SUBCUTANEOUS at 18:02

## 2019-10-11 NOTE — PROGRESS NOTES
Continued Stay Note  [facility]     Patient Name: Kendrick Crystal  MRN: [id number]  Today's Date: 10/11/2019    Admit Date: 10/8/2019    Discharge Plan     Row Name 10/11/19 1501       Plan    Plan  TBD    Patient/Family in Agreement with Plan  yes    Plan Comments  Spoke with wife, Cindy.  She is agreeable to SNF if needed but states patient is to have knee surgery and is hesitant about using SNF benefits at this time if they will be needed after his knee surgery.  She would like [facility] if indicated at discharge.  Patient not medically ready for DC at this time.      Final Discharge Disposition Code  01 - home or self-care        Discharge Codes    No documentation.       Expected Discharge Date and Time     Expected Discharge Date Expected Discharge Time    Oct 11, 2019             Barb Batista RN

## 2019-10-11 NOTE — THERAPY EVALUATION
Patient Name: Kendrick Crystal  : 1970    MRN: [id number]                              Today's Date: 10/11/2019       Admit Date: 10/8/2019    Visit Dx:     ICD-10-CM ICD-9-CM   1. Perinephric abscess N15.1 590.2   2. Sepsis, due to unspecified organism, unspecified whether acute organ dysfunction present (CMS/HCC) A41.9 038.9     995.91   3. Dehydration E86.0 276.51   4. Uncontrolled type 2 diabetes mellitus with hyperglycemia (CMS/[facility]) E11.65 250.02   5. Essential hypertension I10 401.9   6. History of MRSA infection Z86.14 V12.04   7. Cirrhosis of liver without ascites, unspecified hepatic cirrhosis type (CMS/HCC) K74.60 571.5   8. Impaired mobility and ADLs Z74.09 799.89     Patient Active Problem List   Diagnosis   • DUNLAP Cirrhosis   • Essential hypertension   • Non-compliance   • Hyperlipemia   • Hypertriglyceridemia, essential   • Sepsis affecting skin   • Type 2 diabetes mellitus with circulatory disorder and uncontrolled   • History of MRSA infection   • S/P BKA (below knee amputation), left (CMS/[facility])   • Peripheral vascular disease (CMS/[facility])   • DM (diabetes mellitus) type II uncontrolled, periph vascular disorder (CMS/[facility])   • Obesity (BMI 30-39.9)   • Gastroparesis   • Perinephric abscess   • Acute UTI (urinary tract infection)   • Bacteremia due to Klebsiella pneumoniae     Past Medical History:   Diagnosis Date   • Abdominal wall cellulitis 2019   • JUAN (acute kidney injury) (CMS/HCC) 2018   • Arthritis    • Bipolar 1 disorder (CMS/[facility])    • Cellulitis of right anterior lower leg 2018   • Cirrhosis (CMS/[facility])    • Counseling for insulin pump    • Depression    • Diabetes mellitus (CMS/[facility])    • Encephalopathy, hepatic (CMS/[facility])    • H/O degenerative disc disease    • History of Lissa's gangrene     right leg/testicle   • Hyperlipemia    • Hypertension    • multiple Skin abscesses    • DUNLAP, bx showed stage IV fibrosis    • Neuropathy    • Peripheral vascular disease (CMS/[facility])     • Thrombophlebitis      Past Surgical History:   Procedure Laterality Date   • ABDOMINAL WALL ABSCESS INCISION AND DRAINAGE N/A 4/26/2019    Procedure: ABDOMINAL WALL DEBRIDEMENT;  Surgeon: Fadi Kern MD;  Location:  [facility];  Service: General   • AMPUTATION DIGIT Left 1/30/2017    Procedure: left fourth and fifth transmetatarsal toe amputation ;  Surgeon: Juancho Martinez MD;  Location:  [facility];  Service:    • BELOW KNEE AMPUTATION Left 3/2/2017    Procedure: AMPUTATION BELOW KNEE, SHMUEL;  Surgeon: Juancho Martinez MD;  Location:  [facility];  Service:    • ENDOSCOPY     • HEMORRHOIDECTOMY N/A 8/2/2019    Procedure: EXCISION AND DRAIN PERIRECTAL ABSCESS;  Surgeon: Mumtaz Payne MD;  Location:  [facility];  Service: General   • LEG SURGERY     • TESTICLE SURGERY Right     DEBRIDEMENT FROM GANGRENE   • TONSILLECTOMY  1975     General Information     Row Name 10/11/19 1100          PT Evaluation Time/Intention    Document Type  evaluation  -     Mode of Treatment  physical therapy  -     Row Name 10/11/19 1100          General Information    Patient Profile Reviewed?  yes  -     Prior Level of Function  independent:;bed mobility;transfer;ADL's pivot and sliding transfers  -     Existing Precautions/Restrictions  fall;other (see comments) Left BKA  -     Barriers to Rehab  medically complex;previous functional deficit;ineffective coping  -     Row Name 10/11/19 1100          Relationship/Environment    Lives With  spouse  -     Row Name 10/11/19 1100          Resource/Environmental Concerns    Current Living Arrangements  home/apartment/condo  -     Row Name 10/11/19 1100          Cognitive Assessment/Intervention- PT/OT    Orientation Status (Cognition)  oriented to;person;place  -     Row Name 10/11/19 1100          Safety Issues, Functional Mobility    Safety Issues Affecting Function (Mobility)  awareness of need for assistance;insight into deficits/self awareness;safety precaution  awareness;safety precautions follow-through/compliance;judgment  -     Impairments Affecting Function (Mobility)  endurance/activity tolerance;balance;postural/trunk control;strength  -       User Key  (r) = Recorded By, (t) = Taken By, (c) = Cosigned By    Initials Name Provider Type     Sabrina Castano, PT Physical Therapist        Mobility     Row Name 10/11/19 1104          Bed Mobility Assessment/Treatment    Bed Mobility Assessment/Treatment  rolling left;rolling right;scooting/bridging;supine-sit;sit-supine  -LF     Rolling Left [place] (Bed Mobility)  supervision;verbal cues  -LF     Rolling Right [place] (Bed Mobility)  supervision;verbal cues  -LF     Scooting/Bridging [place] (Bed Mobility)  dependent (less than 25% patient effort)  -LF     Supine-Sit [place] (Bed Mobility)  supervision;verbal cues  -LF     Sit-Supine [place] (Bed Mobility)  supervision;verbal cues  -LF     Assistive Device (Bed Mobility)  bed rails;head of bed elevated;draw sheet  -     Comment (Bed Mobility)  Pt with poor participation in PT eval.  Pt refused to pull himself up in bed, requesting to use lift, but was able to move supine<>sitting with SBA.  Anticipate pt is able to assist in pulling himself up in bed with bedrails.    -     Row Name 10/11/19 1104          Transfer Assessment/Treatment    Comment (Transfers)  Pt encouraged to participate in transfer to chair, but declined after multiple attempts.  Pt requesting lift, but stated he is able to transfer bed to [facility] at home without assist.  -     Row Name 10/11/19 1104          Bed-Chair Transfer    Bed-Chair [place] (Transfers)  unable to assess  -     Row Name 10/11/19 1104          Sit-Stand Transfer    Sit-Stand [place] (Transfers)  unable to assess  -     Row Name 10/11/19 1104          Gait/Stairs Assessment/Training    [place] Level (Gait)  unable to assess  -       User Key  (r) = Recorded By, (t) = Taken  By, (c) = Cosigned By    Initials Name Provider Type     Sabrina Castano, PT Physical Therapist        Obj/Interventions     Row Name 10/11/19 1112          General ROM    GENERAL ROM COMMENTS  Right LE ROM WFL, Left hip ROM WFL, Left knee flexion WFL, Left knee extension lacking about 10 degrees.  -[facility] Name 10/11/19 1112          MMT (Manual Muscle Testing)    General MMT Comments  Right hip strength 2/5, right knee and ankle strength grossly 3/5, Left hip and knee strength grossly 3-/5.  -[facility] Name 10/11/19 1112          Static Sitting Balance    Level of [place] (Unsupported Sitting, Static Balance)  supervision;1 person assist  -     Sitting Position (Unsupported Sitting, Static Balance)  sitting on edge of bed  -     Time Able to Maintain Position (Unsupported Sitting, Static Balance)  more than 5 minutes  -LF     Row Name 10/11/19 1112          Dynamic Sitting Balance    Level of [place], Reaches Outside Midline (Sitting, Dynamic Balance)  contact guard assist;1 person assist  -     Sitting Position, Reaches Outside Midline (Sitting, Dynamic Balance)  sitting on edge of bed  -[facility] Name 10/11/19 1112          Static Standing Balance    Level of [place] (Supported Standing, Static Balance)  unable to perform activity  -LF     Row Name 10/11/19 1112          Dynamic Standing Balance    Level of [place], Reaches Outside Midline (Standing, Dynamic Balance)  unable to perform activity  -LF     Row Name 10/11/19 1112          Sensory Assessment/Intervention    Sensory General Assessment  no sensation deficits identified  -       User Key  (r) = Recorded By, (t) = Taken By, (c) = Cosigned By    Initials Name Provider Type     Sabrina Castano PT Physical Therapist        Goals/Plan     Row Name 10/11/19 1121          Bed Mobility Goal 1 (PT)    Activity/Assistive Device (Bed Mobility Goal 1, PT)  bed mobility activities, all  -     [place] Level/Cues Needed  (Bed Mobility Goal 1, PT)  independent  -LF     Time Frame (Bed Mobility Goal 1, PT)  2 weeks  -LF     Progress/Outcomes (Bed Mobility Goal 1, PT)  goal ongoing  -LF     Row Name 10/11/19 1121          Transfer Goal 1 (PT)    Activity/Assistive Device (Transfer Goal 1, PT)  sit-to-stand/stand-to-sit;bed-to-chair/chair-to-bed  -LF     [place] Level/Cues Needed (Transfer Goal 1, PT)  supervision required;1 person assist  -LF     Time Frame (Transfer Goal 1, PT)  2 weeks  -LF     Progress/Outcome (Transfer Goal 1, PT)  goal ongoing  -LF     Row Name 10/11/19 1121          Patient Education Goal (PT)    Activity (Patient Education Goal, PT)  PT POC/HEP  -LF     [place]/Cues/Accuracy (Memory Goal 2, PT)  independent;demonstrates adequately  -LF     Time Frame (Patient Education Goal, PT)  2 weeks  -LF     Progress/Outcome (Patient Education Goal, PT)  goal ongoing  -LF       User Key  (r) = Recorded By, (t) = Taken By, (c) = Cosigned By    Initials Name Provider Type    LF Sabrina Castano, PT Physical Therapist        Clinical Impression     Row Name 10/11/19 1116          Pain Assessment    Additional Documentation  Pain Scale: Numbers Pre/Post-Treatment (Group)  -     Row Name 10/11/19 1116          Pain Scale: Numbers Pre/Post-Treatment    Pain Scale: Numbers, Pretreatment  0/10 - no pain  -LF     Pain Scale: Numbers, Post-Treatment  0/10 - no pain  -     Row Name 10/11/19 1116          Plan of Care Review    Plan of Care Reviewed With  patient;spouse  -     Row Name 10/11/19 1116          Physical Therapy Clinical Impression    Patient/Family Goals Statement (PT Clinical Impression)   Pt reports that he would like to be able to get out of bed.  Spouse stated that she would like him to get his new prosthesis and go to inpatient rehab in order to increase strength and be able to walk again.  -LF     Criteria for Skilled Interventions Met (PT Clinical Impression)  yes;treatment indicated  -LF     Rehab  Potential (PT Clinical Summary)  fair, will monitor progress closely  -LF     Predicted Duration of Therapy (PT)  2 weeks  -LF     Row Name 10/11/19 1116          Vital Signs    Pre Systolic BP Rehab  140  -LF     Pre Treatment Diastolic BP  90  -LF     Pretreatment Heart Rate (beats/min)  76  -LF     Posttreatment Heart Rate (beats/min)  78  -LF     O2 Delivery Pre Treatment  room air  -LF     O2 Delivery Intra Treatment  room air  -LF     O2 Delivery Post Treatment  room air  -LF     Pre Patient Position  Supine  -LF     Intra Patient Position  Sitting  -LF     Post Patient Position  Supine  -LF     Row Name 10/11/19 1116          Positioning and Restraints    Pre-Treatment Position  in bed  -LF     Post Treatment Position  bed  -LF     In Bed  fowlers;side rails up x2;call light within reach;encouraged to call for assist;with family/caregiver;notified nsg  -LF       User Key  (r) = Recorded By, (t) = Taken By, (c) = Cosigned By    Initials Name Provider Type    Sabrina Rivers PT Physical Therapist        Outcome Measures     Row Name 10/11/19 1129          How much help from another person do you currently need...    Turning from your back to your side while in flat bed without using bedrails?  3  -LF     Moving from lying on back to sitting on the side of a flat bed without bedrails?  3  -LF     Moving to and from a bed to a chair (including a wheelchair)?  2  -LF     Standing up from a chair using your arms (e.g., wheelchair, bedside chair)?  2  -LF     Climbing 3-5 steps with a railing?  1  -LF     To walk in hospital room?  2  -LF     AM-PAC 6 Clicks Score (PT)  13  -LF     Row Name 10/11/19 1129          Functional Assessment    Outcome Measure Options  AM-PAC 6 Clicks Basic Mobility (PT)  -LF       User Key  (r) = Recorded By, (t) = Taken By, (c) = Cosigned By    Initials Name Provider Type    Sabrina Rivers PT Physical Therapist        Physical Therapy Education     Title: PT OT SLP Therapies  (In Progress)     Topic: Physical Therapy (In Progress)     Point: Mobility training (In Progress)     Learning Progress Summary           Patient Nonacceptance, E,D, NR by  at 10/11/2019 11:23 AM   Family Nonacceptance, E,D, NR by  at 10/11/2019 11:23 AM                   Point: Home exercise program (In Progress)     Learning Progress Summary           Patient Nonacceptance, E,D, NR by LF at 10/11/2019 11:23 AM   Family Nonacceptance, E,D, NR by LF at 10/11/2019 11:23 AM                   Point: Body mechanics (In Progress)     Learning Progress Summary           Patient Nonacceptance, E,D, NR by  at 10/11/2019 11:23 AM   Family Nonacceptance, E,D, NR by  at 10/11/2019 11:23 AM                   Point: Precautions (In Progress)     Learning Progress Summary           Patient Nonacceptance, E,D, NR by  at 10/11/2019 11:23 AM   Family Nonacceptance, E,D, NR by  at 10/11/2019 11:23 AM                               User Key     Initials Effective Dates Name Provider Type Discipline     06/08/18 -  Sabrina Castano, PT Physical Therapist PT              PT Recommendation and Plan  Planned Therapy Interventions (PT Eval): balance training, bed mobility training, gait training, home exercise program, patient/family education, postural re-education, strengthening, ROM (range of motion), transfer training  Outcome Summary/Treatment Plan (PT)  Anticipated Discharge Disposition (PT): skilled nursing facility  Plan of Care Reviewed With: patient, spouse  Outcome Summary: PT eval complete.  Pt presents with decreased endurance, decreased strength, and decreased balance.  Pt has a history of left BKA and states that his prosthesis does not fit and he has not been able to  his new one.  Pt is depressed and required encouragement for participation in all transfers.  Pt was able to move supine <>sitting at EOB with SBA, but refused to attempt sit to stand or bed to chair transfer training with assist x3.  Pt  would benefit from continued skilled PT to increase strength and activity tolerance with functional mobility.  Recommend continued rehab at SNF upon D/C.     Time Calculation:   PT Charges     Row Name 10/11/19 0955             Time Calculation    Start Time  0955  -LF      PT Received On  10/11/19  -      PT Goal Re-Cert Due Date  10/21/19  -        User Key  (r) = Recorded By, (t) = Taken By, (c) = Cosigned By    Initials Name Provider Type     Sabrina Castano, PT Physical Therapist        Therapy Charges for Today     Code Description Service Date Service Provider Modifiers Qty    35509639140 HC PT EVAL HIGH COMPLEXITY 4 10/11/2019 Sabrina Castano, PT GP 1          PT G-Codes  Outcome Measure Options: AM-PAC 6 Clicks Basic Mobility (PT)  AM-PAC 6 Clicks Score (PT): 13  AM-PAC 6 Clicks Score (OT): 17    Sabrina Castano PT  10/11/2019

## 2019-10-11 NOTE — PROGRESS NOTES
Kendrick Crystal     LOS: 3 days     Subjective    Patient feels better.  Tolerated food last night.  White blood cell count down to 17.  Perinephric abscess growing Klebsiella.  Spoke with Dr. Gutierres.  He is willing to perform cystoscopy to further evaluate pneumaturia.      Objective     Vital Signs  Vitals:    10/11/19 0700   BP: 141/90   Pulse: 80   Resp: 18   Temp:    SpO2:        I/O  Intake & Output (last day)       10/10 0701 - 10/11 0700 10/11 0701 - 10/12 0700    P.O. 480     I.V. (mL/kg) 1570 (14)     IV Piggyback 100     Total Intake(mL/kg) 2150 (19.2)     Urine (mL/kg/hr) 2050 (0.8)     Total Output 2050     Net +100                 Physical Exam:  Abdomen soft and nontender.    Perineum soft without focal tenderness and no erythema.     Results Review:       WBC WBC   Date Value Ref Range Status   10/11/2019 17.49 (H) 3.40 - 10.80 10*3/mm3 Final   10/10/2019 25.20 (H) 3.40 - 10.80 10*3/mm3 Final   10/09/2019 29.99 (C) 3.40 - 10.80 10*3/mm3 Final   10/08/2019 30.92 (C) 3.40 - 10.80 10*3/mm3 Final      HGB Hemoglobin   Date Value Ref Range Status   10/11/2019 9.4 (L) 13.0 - 17.7 g/dL Final   10/10/2019 9.6 (L) 13.0 - 17.7 g/dL Final   10/09/2019 10.2 (L) 13.0 - 17.7 g/dL Final   10/08/2019 11.4 (L) 13.0 - 17.7 g/dL Final      HCT Hematocrit   Date Value Ref Range Status   10/11/2019 28.5 (L) 37.5 - 51.0 % Final   10/10/2019 29.7 (L) 37.5 - 51.0 % Final   10/09/2019 31.2 (L) 37.5 - 51.0 % Final   10/08/2019 34.6 (L) 37.5 - 51.0 % Final      PLT        Results from last 7 days   Lab Units 10/11/19  0500   PLATELETS 10*3/mm3 183            Sodium Sodium   Date Value Ref Range Status   10/11/2019 128 (L) 136 - 145 mmol/L Final   10/10/2019 129 (L) 136 - 145 mmol/L Final   10/09/2019 125 (L) 136 - 145 mmol/L Final   10/08/2019 124 (L) 136 - 145 mmol/L Final      Potassium Potassium   Date Value Ref Range Status   10/11/2019 3.5 3.5 - 5.2 mmol/L Final   10/10/2019 3.6 3.5 - 5.2 mmol/L Final   10/09/2019 3.6 3.5 -  5.2 mmol/L Final   10/08/2019 4.3 3.5 - 5.2 mmol/L Final      Chloride Chloride   Date Value Ref Range Status   10/11/2019 93 (L) 98 - 107 mmol/L Final   10/10/2019 92 (L) 98 - 107 mmol/L Final   10/09/2019 88 (L) 98 - 107 mmol/L Final   10/08/2019 84 (L) 98 - 107 mmol/L Final      Bicarbonate No results found for: PLASMABICARB   BUN BUN   Date Value Ref Range Status   10/11/2019 19 6 - 20 mg/dL Final   10/10/2019 25 (H) 6 - 20 mg/dL Final   10/09/2019 29 (H) 6 - 20 mg/dL Final   10/08/2019 33 (H) 6 - 20 mg/dL Final      Creatinine Creatinine   Date Value Ref Range Status   10/11/2019 0.53 (L) 0.76 - 1.27 mg/dL Final   10/10/2019 0.73 (L) 0.76 - 1.27 mg/dL Final   10/09/2019 0.75 (L) 0.76 - 1.27 mg/dL Final   10/08/2019 0.85 0.76 - 1.27 mg/dL Final      Calcium Calcium   Date Value Ref Range Status   10/11/2019 7.8 (L) 8.6 - 10.5 mg/dL Final   10/10/2019 8.1 (L) 8.6 - 10.5 mg/dL Final   10/09/2019 8.4 (L) 8.6 - 10.5 mg/dL Final   10/08/2019 9.2 8.6 - 10.5 mg/dL Final      Magnesium Magnesium   Date Value Ref Range Status   10/08/2019 1.7 1.6 - 2.6 mg/dL Final            Assessment/Plan       Perinephric abscess    DUNLAP Cirrhosis    Essential hypertension    Non-compliance    Hyperlipemia    Type 2 diabetes mellitus with circulatory disorder and uncontrolled    S/P BKA (below knee amputation), left (CMS/HCC)    Gastroparesis    Acute UTI (urinary tract infection)    Bacteremia due to Klebsiella pneumoniae    Patient is feeling better and white count is coming down.  Reviewed CT scans with Dr. Campos.  On careful reinspection there is still a very small cavity adjacent to the distal end of the prostate and urethra.  The cavity is significantly smaller than it was prior to incision and drainage several months ago.   I spoke with Dr. Gutierres regarding intermittent hematuria.  We discussed the likelihood of him tolerating a major surgery if a colo-vesicle orifice small bowel vesicle fistula is found.  That would be a high  risk procedure given his medical diseases especially cirrhosis and splenomegaly which likely is associated with portal hypertension. Conservative therapy would need to be one of the considerations.  If he has a fistula from the rectum to the urethra consideration be made for an attempt for transrectal repair.  I would likely recommend waiting for another 3 to 4 months to give and I&D site a complete chance to heal.      Mumtaz Payne MD  10/11/19  7:29 AM

## 2019-10-11 NOTE — PLAN OF CARE
Problem: Patient Care Overview  Goal: Plan of Care Review  Outcome: Ongoing (interventions implemented as appropriate)   10/11/19 1834   Plan of Care Review   Progress no change   OTHER   Outcome Summary Pt able to work with therapy today. Pt has no c/o soa or nausea. Some c/o pain , md aware and has ordered duplex on the right leg. VSS. No new concerns at this time,   Coping/Psychosocial   Plan of Care Reviewed With patient       Problem: Fall Risk (Adult)  Goal: Absence of Fall  Outcome: Ongoing (interventions implemented as appropriate)   10/11/19 1834   Fall Risk (Adult)   Absence of Fall making progress toward outcome       Problem: Skin Injury Risk (Adult)  Goal: Skin Health and Integrity  Outcome: Ongoing (interventions implemented as appropriate)   10/11/19 1834   Skin Injury Risk (Adult)   Skin Health and Integrity making progress toward outcome       Problem: Pain, Acute (Adult)  Goal: Acceptable Pain Control/Comfort Level  Outcome: Ongoing (interventions implemented as appropriate)   10/11/19 1834   Pain, Acute (Adult)   Acceptable Pain Control/Comfort Level making progress toward outcome       Problem: Infection, Risk/Actual (Adult)  Goal: Identify Related Risk Factors and Signs and Symptoms  Outcome: Ongoing (interventions implemented as appropriate)   10/10/19 0549   Infection, Risk/Actual (Adult)   Related Risk Factors (Infection, Risk/Actual) skin integrity impairment;immunosuppressed;chronic illness/condition   Signs and Symptoms (Infection, Risk/Actual) blood glucose changes;cultures positive;body temperature changes     Goal: Infection Prevention/Resolution  Outcome: Ongoing (interventions implemented as appropriate)   10/11/19 1834   Infection, Risk/Actual (Adult)   Infection Prevention/Resolution making progress toward outcome

## 2019-10-11 NOTE — PLAN OF CARE
Problem: Patient Care Overview  Goal: Plan of Care Review  Outcome: Ongoing (interventions implemented as appropriate)   10/11/19 1123   OTHER   Outcome Summary PT eval complete. Pt presents with decreased endurance, decreased strength, and decreased balance. Pt has a history of left BKA and states that his prosthesis does not fit and he has not been able to  his new one. Pt is depressed and required encouragement for participation in all transfers. Pt was able to move supine <>sitting at EOB with SBA, but refused to attempt sit to stand or bed to chair transfer training with assist x3. Pt would benefit from continued skilled PT to increase strength and activity tolerance with functional mobility. Recommend continued rehab at SNF upon D/C.   Coping/Psychosocial   Plan of Care Reviewed With patient;spouse

## 2019-10-11 NOTE — PROGRESS NOTES
[facility] Progress Note        Antibiotics:  Anti-Infectives (From admission, onward)    Ordered     Dose/Rate Route Frequency Start Stop    10/09/19 1136  meropenem (MERREM) 1 g/100 mL 0.9% NS VTB (mbp)     Ordering Provider:  Usman Dong MD    1 g  over 3 Hours Intravenous Every 8 Hours 10/09/19 1800 10/19/19 1759    10/09/19 1136  meropenem (MERREM) 1 g/100 mL 0.9% NS VTB (mbp)     Ordering Provider:  Usman Dong MD    1 g  over 30 Minutes Intravenous Once 10/09/19 1230 10/09/19 1251    10/08/19 2338  piperacillin-tazobactam (ZOSYN) 3.375 g in iso-osmotic dextrose 50 ml (premix)     Ordering Provider:  Yasmany Martinez PA-C    3.375 g  over 30 Minutes Intravenous Once 10/08/19 2340 10/09/19 0016          CC: fatigue    HPI:  Patient is a 49 y.o.  Yr old male with history of poorly contolled T2DM, DUNLAP/liver cirrhosis, HTN, PVD/Left BKA, and multiple skin abscesses/MRSA who presented to [facility] ED with right shin wound infection summer 2018.  He was unable to get to see Dr. Martinez as his father passed away unexpectedly on 18 and he had to wait until after the .  The wound worsened becoming gangrenous and he came to [facility] ED in 2018.  He also had been hospitalized at [facility] and then transferred to  for a severe penis/scrotum infection requiring surgical debridement in summer 2018.  He received antibiotics regarding his right leg infection, generally improved over the remainder of summer 2018 but that area had not completely healed. He was admitted 2019 with acute left lower abdominal wall redness/swelling and pain, spontaneous drainage associated with fever/chills and uncontrolled blood sugars.   I&D requiring wide debridement , severe/deep infection and transferred to [facility] on May 3 with IV Zosyn/oral doxycycline. Cultures had lactobacillus and mixed gram-positive skin sherly including alpha strep, staph epidermidis and  corynebacterium. Readmitted to [facility] May 18, 2019, increasing generalized edema ultimately transitioned to oral doxycycline and healed.     He presented to the emergency room July 30, 2019 with acute rectal pain that had begun 7/27; this is associated with constipation for days, chills and nausea/dry heaving.  White blood cell count elevated, high hemoglobin A1c over 13, urinalysis with hematuria/pyuria and CT scan with 3 x 3 cm fluid collection anterior to the distal rectum and per discussion with Dr. Akbar/Jennifer, this is not in the prostate.  Colorectal surgery/urology saw him for consideration of surgical options/timing/threshold, etc..     8/2/19 I&Dper Dr Payne perirectal abscess; patient reports that he had instrumented his rectum prior to hospitalization with enema     8/3/19 urinary retention overnight requiring in/out catheterization per patient.  Creat climb     8/4/19 further creat climb; childs placed and lisinopril stopped and d/w Dr Rubio;  ?obstruction initially associated with retention postop (1200 out with I/O cath postop per nursing) -v- contrast from CT -v- lisinopril/medication/vancomycin -v- relative hypotension -v- likely combination of factors with ATN; nephrology following; vancomycin trough high and vancomycin stopped empirically 8/3 although high trough potentially accumulation as a consequence of diminishing renal function associated with retention/contrast/pressure rather than cause.  Nonetheless, avoid for now; creatinine trending down.        8/7 in retrospect he remembers air in his urine at admit which has raised concern for possible fistula from urinary tract;  No fecaluria and culture from abscess is fecal sherly;  ?rectal-urethral fistula;  Dr Payne aware     Culture with E. coli/Klebsiella species and creatinine generally trended down, transitioned to oral antibiotics at discharge.     Readmitted August 17, 2019 with nausea/vomiting/dry heaves for 3 days.  Childs  "catheter was placed and CT scan of the abdomen showed:      \"Previously seen fluid collection identified inferior to the prostate gland is more increased in density on today's examination. There is wall thickening again seen throughout the bladder. Findings again are concerning for cystitis.\" per radiology     He was transitioned to oral omnicef/topical antifungal on approx 8/23/19; Noncompliant with f/u in our office     READMITTED 10/8/19 RLQ abd pain, urinary retention, nausea, dysuria and generalized fatigue     UTI by U/A, subsequent blood cultures positive for GNR and initial PCR with kleb sp, no carbapenem resistance by initial PCR per my d/w micro;  Abnormal CT abd with right perinephric fluid collection, advanced cirrhosis, urinary bladder thickening.  10/9 Aspirate of perinephric fluid collection consistent with abscess/gram-negative lon and white blood cells     10/10/19 generally fatigued.  He currently denies abd pain;  Walker in and no overt hematuria or pyuria;  He denied any  fecaluria prior to admit to me.  He had complained of air in his urine to Dr. Payne.    10/11/19:  Covering for Dr. Dong.  Pt spiking fever last evening but none since.  Did not feel poorly with the fever.  Walker in place.  No n/v/d.  No rashes.  Tolerating meropenem.  C/o pain behind right knee.     No headache photophobia or neck stiffness.  No shortness of breath cough or hemoptysis.  No diarrhea.  No hematochezia melena or hematemesis.  No skin rash.     ROS:      10/10/19 No f/c/s. No n/v/d. No rash. No new ADR to Abx.     Constitutional-- No Fever, chills or sweats.  Appetite diminished with fatigue  Heent-- No new vision, hearing or throat complaints.  No epistaxis or oral sores.  Denies odynophagia or dysphagia.  No flashers, floaters or eye pain. No odynophagia or dysphagia. No headache, photophobia or neck stiffness.  CV-- No chest pain, palpitation or syncope  Resp-- No SOB/cough/Hemoptysis  GI- No  diarrhea.  No " "hematochezia, melena, or hematemesis. Denies jaundice  -- No dysuria, hematuria, or flank pain.  Denies hesitancy, urgency or flank pain.  Lymph- no swollen lymph nodes in neck/axilla or groin.   Heme- No active bruising or bleeding; no Hx of DVT or PE.  MS-- no swelling or pain in the bones or joints of arms/legs.  No new back pain.  Neuro-- No acute focal weakness or numbness in the arms or legs.  No seizures.     Full 12 point review of systems reviewed and negative otherwise for acute complaints, except for above      PE:   /72 (BP Location: Right arm, Patient Position: Lying)   Pulse 80   Temp 97.6 °F (36.4 °C) (Oral)   Resp 18   Ht 190.5 cm (75\")   Wt 112 kg (246 lb 1.6 oz)   SpO2 98%   BMI 30.76 kg/m²     GENERAL: awake, in no acute distress.   HEENT: Normocephalic, atraumatic.  PERRL. EOMI. No conjunctival injection. No icterus. Oropharynx clear without evidence of thrush or exudate. No evidence of peridontal disease.    HEART: RRR; No murmur, rubs, gallops.   LUNGS: Diminished at bases bilaterally without wheezing, rales, rhonchi. Normal respiratory effort. Nonlabored. No dullness.  ABDOMEN: Soft, nontender, nondistended. Positive bowel sounds. No rebound or guarding.  EXT:  No cyanosis, clubbing or edema. No cord.  +TTP right popliteal fossa.  : +childs  with clear yellow urine.  No CVA tenderness  MSK: Amputee status noted with left BKA  SKIN: Warm and dry without cutaneous eruptions on Inspection/palpation.  Scabs on right shin.  NEURO: awake and alert.    Laboratory Data    Results from last 7 days   Lab Units 10/11/19  0500 10/10/19  0444 10/09/19  0331   WBC 10*3/mm3 17.49* 25.20* 29.99*   HEMOGLOBIN g/dL 9.4* 9.6* 10.2*   HEMATOCRIT % 28.5* 29.7* 31.2*   PLATELETS 10*3/mm3 183 208 235     Results from last 7 days   Lab Units 10/11/19  0500   SODIUM mmol/L 128*   POTASSIUM mmol/L 3.5   CHLORIDE mmol/L 93*   CO2 mmol/L 24.0   BUN mg/dL 19   CREATININE mg/dL 0.53*   GLUCOSE mg/dL 252* "   CALCIUM mg/dL 7.8*     Results from last 7 days   Lab Units 10/08/19  2008   ALK PHOS U/L 207*   BILIRUBIN mg/dL 0.4   ALT (SGPT) U/L 10   AST (SGOT) U/L 9               Estimated Creatinine Clearance: 222.7 mL/min (A) (by C-G formula based on SCr of 0.53 mg/dL (L)).      Microbiology:    Microbiology Results (last 10 days)     Procedure Component Value - Date/Time    Body Fluid Culture - Body Fluid, Peritoneum [[id number]]  (Abnormal)  (Susceptibility) Collected:  10/09/19 1508    Lab Status:  Final result Specimen:  Body Fluid from Peritoneum Updated:  10/11/19 0621     Body Fluid Culture Moderate growth (3+) Klebsiella pneumoniae ssp pneumoniae     Gram Stain Many (4+) WBCs per low power field      Moderate (3+) Gram negative bacilli    Susceptibility      Klebsiella pneumoniae ssp pneumoniae     EYAD     Ampicillin Resistant     Ampicillin + Sulbactam Susceptible     Cefazolin Susceptible     Cefepime Susceptible     Ceftazidime Susceptible     Ceftriaxone Susceptible     Gentamicin Susceptible     Levofloxacin Susceptible     Piperacillin + Tazobactam Susceptible     Trimethoprim + Sulfamethoxazole Susceptible                    Urine Culture - Urine, Urine, Clean Catch [[id number]]  (Abnormal) Collected:  10/08/19 2256    Lab Status:  Preliminary result Specimen:  Urine, Clean Catch Updated:  10/11/19 1119     Urine Culture >100,000 CFU/mL Gram Negative Bacilli    Blood Culture - Blood, Arm, Right [[id number]]  (Abnormal) Collected:  10/08/19 2012    Lab Status:  Final result Specimen:  Blood from Arm, Right Updated:  10/10/19 2122     Blood Culture Klebsiella pneumoniae ssp pneumoniae     Isolated from Aerobic and Anaerobic Bottles     Gram Stain Aerobic Bottle Gram negative bacilli      Anaerobic Bottle Gram negative bacilli    Narrative:       Refer to blood culture collected 10/8/2019 at 2000 for MICs.    Blood Culture - Blood, Arm, Left [[id number]]  (Abnormal)  (Susceptibility) Collected:  10/08/19 2000     Lab Status:  Final result Specimen:  Blood from Arm, Left Updated:  10/10/19 2122     Blood Culture Klebsiella pneumoniae ssp pneumoniae     Isolated from Aerobic and Anaerobic Bottles     Gram Stain Anaerobic Bottle Gram negative bacilli      Aerobic Bottle Gram negative bacilli    Susceptibility      Klebsiella pneumoniae ssp pneumoniae     EYAD     Ampicillin Resistant     Ampicillin + Sulbactam Susceptible     Cefazolin Susceptible     Cefepime Susceptible     Ceftazidime Susceptible     Ceftriaxone Susceptible     Gentamicin Susceptible     Levofloxacin Susceptible     Piperacillin + Tazobactam Susceptible     Trimethoprim + Sulfamethoxazole Susceptible                    Blood Culture ID, PCR - Blood, Arm, Left [[id number]]  (Abnormal) Collected:  10/08/19 2000    Lab Status:  Final result Specimen:  Blood from Arm, Left Updated:  10/09/19 1038     BCID, PCR Klebsiella pneumoniae. Identification by BCID PCR.            Radiology:  Imaging Results (last 72 hours)     Procedure Component Value Units Date/Time    CT Guided Abscess Drain Retroperitoneal [[id number]] Collected:  10/09/19 1611     Updated:  10/09/19 1715    Narrative:       EXAMINATION: CT GUIDED ABSCESS DRAIN RETROPERITONEAL- 10/09/2019     INDICATION: N15.1-Renal and perinephric abscess; A41.9-Sepsis,  unspecified organism; E86.0-Dehydration; E11.65-Type 2 diabetes mellitus  with hyperglycemia; I10-Essential (primary) hypertension;  Z86.14-Personal history of Methicillin resistant Staphylococcus aureus  infection; K74.60-Unspecified cirrhosis of liver; fluid collection     TECHNIQUE: CT-guided drainage of a perinephric fluid collection     The radiation dose reduction device was turned on for each scan per the  ALARA (As Low as Reasonably Achievable) protocol.     COMPARISON: NONE     FINDINGS: Patient referred for CT-guided drainage of a perinephric fluid  collection. Procedure and risks were explained to the patient. Informed  consent was obtained  and signed. Patient placed in the left lateral  position upon the table. The right flank was prepped and draped in a  sterile fashion. 1% lidocaine used as a local anesthetic. Under CT  fluoroscopic guidance an 18-gauge 15 cm Chiba needle was advanced into  the perinephric fluid collection of approximately 10 ml of a reddish  fluid was removed with some debris within the fluid. There is a striated  delayed nephrogram seen of the right kidney suggesting possible  pyelonephritis with surrounding stranding of the perinephric fat.  Myelolipoma seen on the adrenal gland on the left. Chiba needle was  removed and no immediate complication occurred. Slight decrease seen in  size of the perinephric fluid collection status post drainage.       Impression:       CT-guided drainage of a perinephric fluid collection with no  immediate complication.     D:  10/09/2019  E:  10/09/2019     This report was finalized on 10/9/2019 5:12 PM by Dr. Nella Enriquez MD.       CT Abdomen Pelvis With Contrast [[id number]] Collected:  10/08/19 2202     Updated:  10/08/19 2222    Narrative:       CT ABDOMEN AND PELVIS WITH CONTRAST, 10/8/2019    HISTORY:  49-year-old male in the ED complaining of 2 day history right side abdomen pain, nausea, vomiting and generalized weakness. History of hepatic cirrhosis (DUNLAP). History of diabetes with left BKA and multiple abdominal wall abscesses and lower extremity  cellulitis in 2018.    TECHNIQUE:  CT imaging of the abdomen and pelvis with IV contrast. Radiation dose reduction techniques included automated exposure control. Radiation audit for CT and nuclear cardiology exams in the last 12 months: 7.    COMPARISON:  *  CT abdomen/pelvis, 8/18/2019.    ABDOMEN FINDINGS:  Images show a right lower perinephric fluid collection that is either subcapsular or pericapsular. This extends along the lower kidney for a length of about 8.5 cm and has a maximum thickness of 2.5 cm. The fluid is mixed attenuation  and may be  hemorrhagic. Correlate for any recent history of blunt trauma to the flank. Renal parenchyma shows no disruption or abnormal enhancement. There is no fracture of overlying right lower posterior ribs or transverse spinous processes. Given the history,  perinephric abscess is possible but is significantly less likely given normal renal parenchymal enhancement. No evidence of urinary obstruction. No visible nephrolithiasis.    Advanced hepatic cirrhosis. Mild splenomegaly. No suspicious liver lesion. No upper abdominal ascites. Hepatic vasculature appears patent. No gallbladder distention or bile duct dilatation. Negative pancreas.    Benign left adrenal myelolipoma measuring about 2.9 cm. Normal caliber abdominal aorta. Small bowel and colon are normal in caliber and appearance, as imaged. The appendix is normal. No gastric distention or distal esophageal dilatation.    PELVIS FINDINGS:  Air within the urinary bladder, likely iatrogenic. There is at least mild diffuse bladder wall thickening. Prostate and rectum are unremarkable. No inguinal hernia.    Lung base images show multiple benign calcified granulomas.      Impression:       1.  Small mixed attenuation right perinephric fluid collection along the inferior capsule, not present on the prior exam. Perinephric hematoma is a consideration. Correlate clinically for any history of blunt flank trauma. While abscess is possible, this  is unlikely as right renal parenchyma enhances normally with no evidence of pyelonephritis. Laboratory correlation recommended.  2.  Advanced hepatic cirrhosis. Splenomegaly.  3.  Urinary bladder wall thickening. Gas within the bladder is most likely iatrogenic.  4.  Benign left adrenal myelolipoma.    Signer Name: Fadi Healy MD   Signed: 10/8/2019 10:02 PM   Workstation Name: [email or web address]    [facility]    XR Chest PA & Lateral [[id number]] Collected:  10/08/19 2210     Updated:  10/08/19 2212     Narrative:       CHEST X-RAY, 10/8/2019      HISTORY:    49-year-old male in the ED with reported diabetic ketoacidosis.      TECHNIQUE:  AP portable upright chest series.      FINDINGS:  Heart size and pulmonary vascularity are normal. The lungs are expanded and clear. No visible pulmonary infiltrate or pleural effusion. Lower lung benign calcified granulomas.      Impression:       Negative chest.    Signer Name: Fadi Healy MD   Signed: 10/8/2019 10:10 PM   Workstation Name: [email or web address]    [facility]            Impression:      --Acute Klebsiella pneumoniae septicemia/sepsis, likely urinary source/pyelnoephritis associated with urinary retention and  with abnormal CT scan with perinephric collection/abscess and Klebsiella on aspirate;  IV abx ongoing; urology to determine any timing/option or threshold for further intervention or functional/anatomic assessment.     --Hx perirectal abscess ; Colorectal surgery, Dr. Payne previously saw and is following.  Drainage as above on August 2 with mxed Fecal sherly in culture; CT scans  with no mention of abscess on 8/18 study;  Dr Payne  following given urology conern for fistula and to help determine any timing/option or threshold for further GI/colorectal work-up     --History urinary retention.     --Hx ARF in August 2019;  ?obstruction initially associated with retention postop (1200 out with I/O cath postop per nursing) -v- contrast from CT -v- lisinopril/medication/vancomycin -v- relative hypotension -v- likely combination of factors with ATN;  vancomycin trough high and vancomycin stopped empirically 8/3 although high trough potentially accumulation as a consequence of diminishing renal function associated with retention/contrast/pressure rather than cause.  Nonetheless, avoid for now; likely further acute kidney injury at admission August 17 associated with dehydration/prerenal/ATN etiology     --History MRSA;  Not in current  culture     --Diabetes mellitus type 2, uncontrolled.  He reports the reason for this is forgetfulness     --History Johnston and chronic liver disease/cirrhosis  per past notes     --Left BKA     --Peripheral vascular disease     --medical noncompliance      PLAN:     --IV meropenem for now; may de-escalate to ceftriaxone soon if same Klebsiella pneumoniae in final urine cx as in blood and abscess.    --urology evaluation pending.  May need cystoscopy to assess for fistula.      --Discussed with Dr. Payne.  Patient poor surgical candidate and attempted to treat conservatively with abx for now.    --Monitor WBC and fever curve.  WBC improved today but spiked fever last evening.    Complex case.    Ayad Blount MD  10/11/2019  2:29 PM

## 2019-10-11 NOTE — THERAPY EVALUATION
Acute Care - Occupational Therapy Initial Evaluation  [facility]     Patient Name: Kendrick Crystal  : 1970  MRN: [id number]  Today's Date: 10/11/2019  Onset of Illness/Injury or Date of Surgery: 10/08/19  Date of Referral to OT: 10/09/19  Referring Physician: CHINTAN Lenz    Admit Date: 10/8/2019       ICD-10-CM ICD-9-CM   1. Perinephric abscess N15.1 590.2   2. Sepsis, due to unspecified organism, unspecified whether acute organ dysfunction present (CMS/[facility]) A41.9 038.9     995.91   3. Dehydration E86.0 276.51   4. Uncontrolled type 2 diabetes mellitus with hyperglycemia (CMS/[facility]) E11.65 250.02   5. Essential hypertension I10 401.9   6. History of MRSA infection Z86.14 V12.04   7. Cirrhosis of liver without ascites, unspecified hepatic cirrhosis type (CMS/[facility]) K74.60 571.5   8. Impaired mobility and ADLs Z74.09 799.89     Patient Active Problem List   Diagnosis   • DUNLAP Cirrhosis   • Essential hypertension   • Non-compliance   • Hyperlipemia   • Hypertriglyceridemia, essential   • Sepsis affecting skin   • Type 2 diabetes mellitus with circulatory disorder and uncontrolled   • History of MRSA infection   • S/P BKA (below knee amputation), left (CMS/[facility])   • Peripheral vascular disease (CMS/[facility])   • DM (diabetes mellitus) type II uncontrolled, periph vascular disorder (CMS/[facility])   • Obesity (BMI 30-39.9)   • Gastroparesis   • Perinephric abscess   • Acute UTI (urinary tract infection)   • Bacteremia due to Klebsiella pneumoniae     Past Medical History:   Diagnosis Date   • Abdominal wall cellulitis 2019   • JUAN (acute kidney injury) (CMS/[facility]) 2018   • Arthritis    • Bipolar 1 disorder (CMS/[facility])    • Cellulitis of right anterior lower leg 2018   • Cirrhosis (CMS/[facility])    • Counseling for insulin pump    • Depression    • Diabetes mellitus (CMS/[facility])    • Encephalopathy, hepatic (CMS/[facility])    • H/O degenerative disc disease    • History of Lissa's gangrene     right leg/testicle   •  Hyperlipemia    • Hypertension    • multiple Skin abscesses    • DUNLAP, bx showed stage IV fibrosis    • Neuropathy    • Peripheral vascular disease (CMS/HCC)    • Thrombophlebitis      Past Surgical History:   Procedure Laterality Date   • ABDOMINAL WALL ABSCESS INCISION AND DRAINAGE N/A 4/26/2019    Procedure: ABDOMINAL WALL DEBRIDEMENT;  Surgeon: Fadi Kern MD;  Location:  [facility];  Service: General   • AMPUTATION DIGIT Left 1/30/2017    Procedure: left fourth and fifth transmetatarsal toe amputation ;  Surgeon: Juancho Martinez MD;  Location:  [facility];  Service:    • BELOW KNEE AMPUTATION Left 3/2/2017    Procedure: AMPUTATION BELOW KNEE, SHMUEL;  Surgeon: Juancho Martinez MD;  Location:  [facility];  Service:    • ENDOSCOPY     • HEMORRHOIDECTOMY N/A 8/2/2019    Procedure: EXCISION AND DRAIN PERIRECTAL ABSCESS;  Surgeon: Mumtaz Payne MD;  Location:  [facility];  Service: General   • LEG SURGERY     • TESTICLE SURGERY Right     DEBRIDEMENT FROM GANGRENE   • TONSILLECTOMY  1975          OT ASSESSMENT FLOWSHEET (last 12 hours)      Occupational Therapy Evaluation     Row Name 10/11/19 0958                   OT Evaluation Time/Intention    Subjective Information  complains of;weakness  -        Document Type  evaluation  -        Mode of Treatment  occupational therapy  -AC        Patient Effort  fair  -           General Information    Patient Profile Reviewed?  yes  -        Onset of Illness/Injury or Date of Surgery  10/08/19  -        Referring Physician  CHINTAN Lenz  -        Patient Observations  alert;poorly cooperative  -        Patient/Family Observations  Wife present  -        General Observations of Patient  Pt received in bed  -AC        Prior Level of Function  independent:;transfer;bed mobility;ADL's pivot transfers  -        Equipment Currently Used at Home  bath bench;wheelchair, motorized;walker, rolling  -        Pertinent History of Current Functional Problem  Pt with  perinephric abcess.   L BKA - has prosthesis, but loose and in process of obtaining new prosthesis  -AC        Existing Precautions/Restrictions  fall L BKA  -AC        Risks Reviewed  patient:;spouse/S.O.:;LOB;increased discomfort  -AC        Benefits Reviewed  patient:;spouse/S.O.:;improve function;increase independence;increase strength;increase balance;increase knowledge  -AC        Barriers to Rehab  medically complex;physical barrier;ineffective coping  -AC           Relationship/Environment    Primary Source of Support/Comfort  spouse  -AC        Lives With  spouse  -AC           Resource/Environmental Concerns    Current Living Arrangements  home/apartment/condo  -AC           Cognitive Assessment/Interventions    Additional Documentation  Cognitive Assessment/Intervention (Group)  -AC           Cognitive Assessment/Intervention- PT/OT    Behavioral Issues (Cognitive)  difficulty managing stress  -AC        Orientation Status (Cognition)  oriented to;person;place  -AC        Follows Commands (Cognition)  follows one step commands;over 90% accuracy  -AC           Safety Issues, Functional Mobility    Safety Issues Affecting Function (Mobility)  insight into deficits/self awareness;judgment  -AC        Impairments Affecting Function (Mobility)  balance;strength  -AC           Bed Mobility Assessment/Treatment    Bed Mobility Assessment/Treatment  rolling left;rolling right;supine-sit;sit-supine  -AC        Rolling Left [place] (Bed Mobility)  supervision  -AC        Rolling Right [place] (Bed Mobility)  supervision  -AC        Supine-Sit [place] (Bed Mobility)  supervision increasd time and effort to bring trunk to midline  -AC        Sit-Supine [place] (Bed Mobility)  supervision  -AC        Bed Mobility, Safety Issues  decreased use of arms for pushing/pulling;decreased use of legs for bridging/pushing  -AC           Functional Mobility    Functional Mobility- Comment  pt declined  -AC            Transfer Assessment/Treatment    Comment (Transfers)  pt given encouragement, but pt declined to attempt  -AC           ADL Assessment/Intervention    BADL Assessment/Intervention  lower body dressing  -AC           Lower Body Dressing Assessment/Training    Lower Body Dressing [place] Level  don;shoes/slippers;set up;supervision R shoe  -AC        Lower Body Dressing Position  unsupported sitting  -AC           BADL Safety/Performance    Impairments, BADL Safety/Performance  balance;strength  -AC           General ROM    GENERAL ROM COMMENTS  B shld AROM limited 50%,  otherwise BUE WFL  -AC           MMT (Manual Muscle Testing)    General MMT Comments  B shld grossly 2+/5, otherwise grossly 3+/5  -AC           Positioning and Restraints    Pre-Treatment Position  in bed  -AC        Post Treatment Position  bed  -AC        In Bed  supine;call light within reach;encouraged to call for assist;with family/caregiver  -AC           Pain Assessment    Additional Documentation  Pain Scale: Numbers Pre/Post-Treatment (Group)  -AC           Pain Scale: Numbers Pre/Post-Treatment    Pain Scale: Numbers, Pretreatment  0/10 - no pain  -AC        Pain Scale: Numbers, Post-Treatment  0/10 - no pain  -AC           Residual Limb Assessment 05/30/19 2000 transtibial (below knee), left    Residual Limb Assessment - Properties Group Date first assessed: 05/30/19  -BN Time first assessed: 2000  -BN Location: transtibial (below knee), left  -PT       Wound 04/25/19 0845 Right lower;anterior leg abrasion    Wound - Properties Group Date first assessed: 04/25/19  -CP Time first assessed: 0845  -CP Side: Right  -CP Orientation: lower;anterior  -CP Location: leg  -CP Present On Admission : yes;picture taken  -CP Type: abrasion  -CP       Clinical Impression (OT)    Date of Referral to OT  10/09/19  -AC        OT Diagnosis  ADL decline  -AC        Criteria for Skilled Therapeutic Interventions Met (OT Eval)  yes  -AC        Rehab  Potential (OT Eval)  fair, will monitor progress closely  -AC        Therapy Frequency (OT Eval)  daily  -AC        Care Plan Review (OT)  evaluation/treatment results reviewed  -AC        Care Plan Review, Other Participant (OT Eval)  spouse  -AC        Anticipated Discharge Disposition (OT)  skilled nursing facility  -AC           Vital Signs    Pre Systolic BP Rehab  141  -AC        Pre Treatment Diastolic BP  90  -AC        Pretreatment Heart Rate (beats/min)  76  -AC        Posttreatment Heart Rate (beats/min)  78  -AC        O2 Delivery Pre Treatment  room air  -AC        O2 Delivery Intra Treatment  room air  -AC        O2 Delivery Post Treatment  room air  -AC        Pre Patient Position  Supine  -AC        Intra Patient Position  Sitting  -AC        Post Patient Position  Supine  -AC           Planned OT Interventions    Planned Therapy Interventions (OT Eval)  BADL retraining;functional balance retraining;occupation/activity based interventions;strengthening exercise;transfer/mobility retraining  -AC           OT Goals    Transfer Goal Selection (OT)  transfer, OT goal 1  -AC        Dressing Goal Selection (OT)  dressing, OT goal 1  -AC        Toileting Goal Selection (OT)  toileting, OT goal 1  -AC        Strength Goal Selection (OT)  strength, OT goal 1  -AC        Additional Documentation  Strength Goal Selection (OT) (Row)  -AC           Transfer Goal 1 (OT)    Activity/Assistive Device (Transfer Goal 1, OT)  bed-to-chair/chair-to-bed;toilet;walker, rolling platform  -AC        [place] Level/Cues Needed (Transfer Goal 1, OT)  moderate assist (50-74% patient effort)  -AC        Time Frame (Transfer Goal 1, OT)  by discharge  -AC        Progress/Outcome (Transfer Goal 1, OT)  goal ongoing  -AC           Dressing Goal 1 (OT)    Activity/Assistive Device (Dressing Goal 1, OT)  lower body dressing  -AC        [place]/Cues Needed (Dressing Goal 1, OT)  set-up required;supervision required  -AC         Time Frame (Dressing Goal 1, OT)  by discharge  -AC        Progress/Outcome (Dressing Goal 1, OT)  goal ongoing  -AC           Toileting Goal 1 (OT)    Activity/Device (Toileting Goal 1, OT)  perform perineal hygiene  -AC        [place] Level/Cues Needed (Toileting Goal 1, OT)  set-up required;minimum assist (75% or more patient effort)  -AC        Time Frame (Toileting Goal 1, OT)  by discharge  -AC        Progress/Outcome (Toileting Goal 1, OT)  goal ongoing  -AC           Strength Goal 1 (OT)    Strength Goal 1 (OT)  Pt will increase BUE strength 1 MMG as needed to support self care and mobility  -AC        Time Frame (Strength Goal 1, OT)  by discharge  -AC        Progress/Outcome (Strength Goal 1, OT)  goal ongoing  -AC           Living Environment    Home Accessibility  tub/shower is not walk in ramp  -AC          User Key  (r) = Recorded By, (t) = Taken By, (c) = Cosigned By    Initials Name Effective Dates     Clarita Enamorado, OT 06/23/15 -     CP Alivia Cespedes APRN 02/05/19 - 05/21/19    PT Amy Villanueva LPN 03/14/16 -     BN Mariah Flores RN 02/04/19 -          Occupational Therapy Education     Title: PT OT SLP Therapies (In Progress)     Topic: Occupational Therapy (In Progress)     Point: ADL training (Done)     Description: Instruct learner(s) on proper safety adaptation and remediation techniques during self care or transfers.   Instruct in proper use of assistive devices.    Learning Progress Summary           Patient Acceptance, E, VU,NR by  at 10/11/2019  9:58 AM    Comment:  benefits of activity   Family Acceptance, E, VU,NR by  at 10/11/2019  9:58 AM    Comment:  benefits of activity                               User Key     Initials Effective Dates Name Provider Type Discipline     06/23/15 -  Clarita Enamorado, OT Occupational Therapist OT                  OT Recommendation and Plan  Outcome Summary/Treatment Plan (OT)  Anticipated Discharge Disposition (OT):  skilled nursing facility  Planned Therapy Interventions (OT Eval): BADL retraining, functional balance retraining, occupation/activity based interventions, strengthening exercise, transfer/mobility retraining  Therapy Frequency (OT Eval): daily  Plan of Care Review  Plan of Care Reviewed With: patient, spouse  Plan of Care Reviewed With: patient, spouse  Outcome Summary: OT eval complete.  Pt with h/o L BKA.  Pt presents with weakness limiting independence with self care.  Pt required supervision with bed mobility, setup/supervision to don R shoe. Pt given encouragement, but refused bed to chair transfer today.  OT will attempt to follow to advance pt toward PLOF with ADLs.   Recommend SNF for rehab pending participation in hospital setting.    Outcome Measures     Row Name 10/11/19 0958             How much help from another is currently needed...    Putting on and taking off regular lower body clothing?  3  -AC      Bathing (including washing, rinsing, and drying)  2  -AC      Toileting (which includes using toilet bed pan or urinal)  2  -AC      Putting on and taking off regular upper body clothing  3  -AC      Taking care of personal grooming (such as brushing teeth)  3  -AC      Eating meals  4  -AC      AM-PAC 6 Clicks Score (OT)  17  -AC         Functional Assessment    Outcome Measure Options  AM-PAC 6 Clicks Daily Activity (OT)  -AC        User Key  (r) = Recorded By, (t) = Taken By, (c) = Cosigned By    Initials Name Provider Type    Clarita Patel OT Occupational Therapist          Time Calculation:   Time Calculation- OT     Row Name 10/11/19 0958             Time Calculation- OT    OT Start Time  0958 -      OT Received On  10/11/19  -      OT Goal Re-Cert Due Date  10/21/19  -        User Key  (r) = Recorded By, (t) = Taken By, (c) = Cosigned By    Initials Name Provider Type    Clarita Patel OT Occupational Therapist        Therapy Charges for Today     Code Description Service Date  Service Provider Modifiers Qty    99807217349 HC OT EVAL MOD COMPLEXITY 4 10/11/2019 Clarita Enamorado, OT GO 1               Clarita Enamorado OT  10/11/2019

## 2019-10-11 NOTE — PROGRESS NOTES
[facility] Medicine Services  PROGRESS NOTE    Patient Name: Kendrick Crystal  : 1970  MRN: [id number]    Date of Admission: 10/8/2019  Primary Care Physician: No primary care provider on file.    Subjective   Subjective     CC:  Right flank pain     HPI:  No acute events overnight, he was unable to sleep during the night because he slept all day yesterday. Pain in right flank is improved. Had fever overnight.    Review of Systems  CV- No chest pain, palpitations  Resp- No cough, dyspnea  GI- No N/V/D, abd pain    All other systems reviewed and negative.     Objective   Objective     Vital Signs:   Temp:  [97.6 °F (36.4 °C)-101.5 °F (38.6 °C)] 97.6 °F (36.4 °C)  Heart Rate:  [76-90] 80  Resp:  [16-18] 18  BP: (107-141)/(67-90) 107/72        Physical Exam:  Constitutional: No acute distress, sleepy, but answering questions appropriately  HENT: NCAT, mucous membranes moist  Respiratory: Clear to auscultation bilaterally, respiratory effort normal   Cardiovascular: no murmurs, rubs, or gallops, palpable pedal pulses bilaterally, no edema in RLE  Gastrointestinal: Positive bowel sounds, soft, nontender, nondistended  Musculoskeletal: left BKA   Psychiatric: Appropriate affect, cooperative  Neurologic: Oriented x 3, strength symmetric in all extremities, Cranial Nerves grossly intact to confrontation, speech clear  Skin: No rashes    Results Reviewed:    Results from last 7 days   Lab Units 10/11/19  0500 10/10/19  0444 10/09/19  0940 10/09/19  0331   WBC 10*3/mm3 17.49* 25.20*  --  29.99*   HEMOGLOBIN g/dL 9.4* 9.6*  --  10.2*   HEMATOCRIT % 28.5* 29.7*  --  31.2*   PLATELETS 10*3/mm3 183 208  --  235   INR   --   --  1.17*  --    PROCALCITONIN ng/mL  --   --   --  1.60*     Results from last 7 days   Lab Units 10/11/19  0500 10/10/19  0444 10/09/19  0331 10/08/19  2008   SODIUM mmol/L 128* 129* 125* 124*   POTASSIUM mmol/L 3.5 3.6 3.6 4.3   CHLORIDE mmol/L 93* 92* 88* 84*   CO2 mmol/L  24.0 24.0 22.0 23.0   BUN mg/dL 19 25* 29* 33*   CREATININE mg/dL 0.53* 0.73* 0.75* 0.85   GLUCOSE mg/dL 252* 170* 341* 449*   CALCIUM mg/dL 7.8* 8.1* 8.4* 9.2   ALT (SGPT) U/L  --   --   --  10   AST (SGOT) U/L  --   --   --  9   TROPONIN T ng/mL  --   --  0.031* 0.042*     Estimated Creatinine Clearance: 222.7 mL/min (A) (by C-G formula based on SCr of 0.53 mg/dL (L)).    Microbiology Results Abnormal     Procedure Component Value - Date/Time    Urine Culture - Urine, Urine, Clean Catch [[id number]]  (Abnormal) Collected:  10/08/19 2256    Lab Status:  Preliminary result Specimen:  Urine, Clean Catch Updated:  10/11/19 1119     Urine Culture >100,000 CFU/mL Gram Negative Bacilli    Body Fluid Culture - Body Fluid, Peritoneum [[id number]]  (Abnormal)  (Susceptibility) Collected:  10/09/19 1508    Lab Status:  Final result Specimen:  Body Fluid from Peritoneum Updated:  10/11/19 0621     Body Fluid Culture Moderate growth (3+) Klebsiella pneumoniae ssp pneumoniae     Gram Stain Many (4+) WBCs per low power field      Moderate (3+) Gram negative bacilli    Susceptibility      Klebsiella pneumoniae ssp pneumoniae     EYAD     Ampicillin Resistant     Ampicillin + Sulbactam Susceptible     Cefazolin Susceptible     Cefepime Susceptible     Ceftazidime Susceptible     Ceftriaxone Susceptible     Gentamicin Susceptible     Levofloxacin Susceptible     Piperacillin + Tazobactam Susceptible     Trimethoprim + Sulfamethoxazole Susceptible                    Blood Culture - Blood, Arm, Right [[id number]]  (Abnormal) Collected:  10/08/19 2012    Lab Status:  Final result Specimen:  Blood from Arm, Right Updated:  10/10/19 2122     Blood Culture Klebsiella pneumoniae ssp pneumoniae     Isolated from Aerobic and Anaerobic Bottles     Gram Stain Aerobic Bottle Gram negative bacilli      Anaerobic Bottle Gram negative bacilli    Narrative:       Refer to blood culture collected 10/8/2019 at 2000 for MICs.    Blood Culture - Blood,  Arm, Left [[id number]]  (Abnormal)  (Susceptibility) Collected:  10/08/19 2000    Lab Status:  Final result Specimen:  Blood from Arm, Left Updated:  10/10/19 2122     Blood Culture Klebsiella pneumoniae ssp pneumoniae     Isolated from Aerobic and Anaerobic Bottles     Gram Stain Anaerobic Bottle Gram negative bacilli      Aerobic Bottle Gram negative bacilli    Susceptibility      Klebsiella pneumoniae ssp pneumoniae     EYAD     Ampicillin Resistant     Ampicillin + Sulbactam Susceptible     Cefazolin Susceptible     Cefepime Susceptible     Ceftazidime Susceptible     Ceftriaxone Susceptible     Gentamicin Susceptible     Levofloxacin Susceptible     Piperacillin + Tazobactam Susceptible     Trimethoprim + Sulfamethoxazole Susceptible                    Blood Culture ID, PCR - Blood, Arm, Left [[id number]]  (Abnormal) Collected:  10/08/19 2000    Lab Status:  Final result Specimen:  Blood from Arm, Left Updated:  10/09/19 1038     BCID, PCR Klebsiella pneumoniae. Identification by BCID PCR.          Imaging Results (last 24 hours)     ** No results found for the last 24 hours. **          Results for orders placed during the hospital encounter of 05/18/19   Adult Transthoracic Echo Complete W/ Cont if Necessary Per Protocol    Narrative · Estimated EF = 60%.  · Mild mitral valve regurgitation is present  · Mild tricuspid valve regurgitation is present.  · Calculated right ventricular systolic pressure from tricuspid   regurgitation is 29 mmHg.          I have reviewed the medications:  Scheduled Meds:  amLODIPine 10 mg Oral Q24H   DULoxetine 30 mg Oral Daily   insulin detemir 20 Units Subcutaneous Q12H   insulin lispro 0-9 Units Subcutaneous 4x Daily With Meals & Nightly   insulin lispro 5 Units Subcutaneous TID With Meals   meropenem 1 g Intravenous Q8H   metoprolol tartrate 100 mg Oral Q12H   sevelamer 800 mg Oral TID With Meals   sodium chloride 10 mL Intravenous Q12H   terazosin 5 mg Oral Nightly     Continuous  Infusions:  sodium chloride 100 mL/hr Last Rate: 100 mL/hr (10/11/19 1426)     PRN Meds:.•  acetaminophen **OR** acetaminophen **OR** acetaminophen  •  dextrose  •  dextrose  •  glucagon (human recombinant)  •  HYDROcodone-acetaminophen  •  LORazepam  •  melatonin  •  ondansetron  •  potassium chloride **OR** potassium chloride **OR** potassium chloride  •  sodium chloride      Assessment/Plan   Assessment / Plan     Active Hospital Problems    Diagnosis  POA   • **Perinephric abscess [N15.1]  Yes   • Bacteremia due to Klebsiella pneumoniae [R78.81]  Unknown   • Acute UTI (urinary tract infection) [N39.0]  Yes   • Gastroparesis [K31.84]  Yes   • S/P BKA (below knee amputation), left (CMS/HCC) [Z89.512]  Not Applicable   • Type 2 diabetes mellitus with circulatory disorder and uncontrolled [E11.59]  Yes   • DUNLAP Cirrhosis [K74.60]  Yes   • Essential hypertension [I10]  Yes   • Hyperlipemia [E78.5]  Yes   • Non-compliance [Z91.14]  Not Applicable      Resolved Hospital Problems   No resolved problems to display.        Brief Hospital Course to date:  Kendrick rCystal is a 49 y.o. male  50 yo gentleman new patient for me today, hx of T2DM, hypertension, hx of multiple skin/abscesses/MRSA infection, DUNLAP cirrhosis, PVD s/p L BKA, and  presented with sepsis 2/2 renal and perinephric abscess s/p IR guided drainage of abscess culture growing Klebsiella pneumoniae currently on meropenem since 10/9.    Sepsis 2/2 perinephric abscess and UTI, Klebsiella pneuomniae bacteremia  -ID following, on meropenem   -cultures from peritoneum and blood cultures from 10/10 growing Klebsiella resistant to ampicillin, if urine culture also growing klebsiella pneumoniae can de-escalate to IV ceftriaxone   -urology consult     Right lower extremity pain above knee: no edema or redness on  Exam, get duplex RLE to rule out DVT    BPH: continue terazosin qnightly  HTN: continue lopressor 100 mg q12h and amlodipine 10 mg q 24h  T2DM: continue  levemir 20u sc q12h and correction dose insulin    DVT Prophylaxis:  mechanical    Disposition: I expect the patient to be discharged when appropriate antibiotic selection can be made, plan for SNF upon discharge.    CODE STATUS:   Code Status and Medical Interventions:   Ordered at: 10/08/19 9952     Level Of Support Discussed With:    Patient     Code Status:    CPR     Medical Interventions (Level of Support Prior to Arrest):    Full         Electronically signed by Lucía Calvin MD, 10/11/19, 3:31 PM.

## 2019-10-11 NOTE — PLAN OF CARE
Problem: Patient Care Overview  Goal: Plan of Care Review  Outcome: Ongoing (interventions implemented as appropriate)   10/11/19 4745   OTHER   Outcome Summary OT eval complete. Pt with h/o L BKA. Pt presents with weakness limiting independence with self care. Pt required supervision with bed mobility, setup/supervision to don R shoe. Pt given encouragement, but refused bed to chair transfer today. OT will attempt to follow to advance pt toward PLOF with ADLs. Recommend SNF for rehab pending participation in hospital setting.   Coping/Psychosocial   Plan of Care Reviewed With patient;spouse

## 2019-10-11 NOTE — PLAN OF CARE
Problem: Patient Care Overview  Goal: Plan of Care Review  Outcome: Ongoing (interventions implemented as appropriate)   10/10/19 0601 10/10/19 2200 10/11/19 0324   Plan of Care Review   Progress no change --  --    OTHER   Outcome Summary --  --  VSS. Patient had a slight increase in appetite and seemed more awake. Fever noted at beginning of shift. No other complaints of pain or N/V. Will continue to monitor. VSS   Coping/Psychosocial   Plan of Care Reviewed With --  patient --        Problem: Fall Risk (Adult)  Goal: Absence of Fall  Outcome: Ongoing (interventions implemented as appropriate)   10/11/19 0324   Fall Risk (Adult)   Absence of Fall making progress toward outcome       Problem: Skin Injury Risk (Adult)  Goal: Skin Health and Integrity  Outcome: Ongoing (interventions implemented as appropriate)   10/11/19 0324   Skin Injury Risk (Adult)   Skin Health and Integrity making progress toward outcome       Problem: Pain, Acute (Adult)  Goal: Acceptable Pain Control/Comfort Level  Outcome: Ongoing (interventions implemented as appropriate)   10/11/19 0324   Pain, Acute (Adult)   Acceptable Pain Control/Comfort Level making progress toward outcome       Problem: Infection, Risk/Actual (Adult)  Goal: Identify Related Risk Factors and Signs and Symptoms  Outcome: Ongoing (interventions implemented as appropriate)   10/10/19 0549   Infection, Risk/Actual (Adult)   Related Risk Factors (Infection, Risk/Actual) skin integrity impairment;immunosuppressed;chronic illness/condition   Signs and Symptoms (Infection, Risk/Actual) blood glucose changes;cultures positive;body temperature changes     Goal: Infection Prevention/Resolution  Outcome: Ongoing (interventions implemented as appropriate)   10/11/19 0324   Infection, Risk/Actual (Adult)   Infection Prevention/Resolution making progress toward outcome

## 2019-10-12 LAB
ANION GAP SERPL CALCULATED.3IONS-SCNC: 8 MMOL/L (ref 5–15)
BASOPHILS # BLD AUTO: 0.04 10*3/MM3 (ref 0–0.2)
BASOPHILS NFR BLD AUTO: 0.3 % (ref 0–1.5)
BUN BLD-MCNC: 15 MG/DL (ref 6–20)
BUN/CREAT SERPL: 31.3 (ref 7–25)
CALCIUM SPEC-SCNC: 7.6 MG/DL (ref 8.6–10.5)
CHLORIDE SERPL-SCNC: 98 MMOL/L (ref 98–107)
CO2 SERPL-SCNC: 24 MMOL/L (ref 22–29)
CREAT BLD-MCNC: 0.48 MG/DL (ref 0.76–1.27)
DEPRECATED RDW RBC AUTO: 45.2 FL (ref 37–54)
EOSINOPHIL # BLD AUTO: 0.07 10*3/MM3 (ref 0–0.4)
EOSINOPHIL NFR BLD AUTO: 0.5 % (ref 0.3–6.2)
ERYTHROCYTE [DISTWIDTH] IN BLOOD BY AUTOMATED COUNT: 14.6 % (ref 12.3–15.4)
GFR SERPL CREATININE-BSD FRML MDRD: >150 ML/MIN/1.73
GLUCOSE BLD-MCNC: 206 MG/DL (ref 65–99)
GLUCOSE BLDC GLUCOMTR-MCNC: 121 MG/DL (ref 70–130)
GLUCOSE BLDC GLUCOMTR-MCNC: 134 MG/DL (ref 70–130)
GLUCOSE BLDC GLUCOMTR-MCNC: 163 MG/DL (ref 70–130)
GLUCOSE BLDC GLUCOMTR-MCNC: 180 MG/DL (ref 70–130)
HCT VFR BLD AUTO: 28.3 % (ref 37.5–51)
HGB BLD-MCNC: 9.1 G/DL (ref 13–17.7)
IMM GRANULOCYTES # BLD AUTO: 0.23 10*3/MM3 (ref 0–0.05)
IMM GRANULOCYTES NFR BLD AUTO: 1.6 % (ref 0–0.5)
LYMPHOCYTES # BLD AUTO: 1.74 10*3/MM3 (ref 0.7–3.1)
LYMPHOCYTES NFR BLD AUTO: 12 % (ref 19.6–45.3)
MCH RBC QN AUTO: 27.6 PG (ref 26.6–33)
MCHC RBC AUTO-ENTMCNC: 32.2 G/DL (ref 31.5–35.7)
MCV RBC AUTO: 85.8 FL (ref 79–97)
MONOCYTES # BLD AUTO: 1.29 10*3/MM3 (ref 0.1–0.9)
MONOCYTES NFR BLD AUTO: 8.9 % (ref 5–12)
NEUTROPHILS # BLD AUTO: 11.15 10*3/MM3 (ref 1.7–7)
NEUTROPHILS NFR BLD AUTO: 76.7 % (ref 42.7–76)
NRBC BLD AUTO-RTO: 0 /100 WBC (ref 0–0.2)
PLATELET # BLD AUTO: 234 10*3/MM3 (ref 140–450)
PMV BLD AUTO: 12.1 FL (ref 6–12)
POTASSIUM BLD-SCNC: 4 MMOL/L (ref 3.5–5.2)
RBC # BLD AUTO: 3.3 10*6/MM3 (ref 4.14–5.8)
SODIUM BLD-SCNC: 130 MMOL/L (ref 136–145)
WBC NRBC COR # BLD: 14.52 10*3/MM3 (ref 3.4–10.8)

## 2019-10-12 PROCEDURE — 25010000002 CEFTRIAXONE PER 250 MG: Performed by: INTERNAL MEDICINE

## 2019-10-12 PROCEDURE — 99232 SBSQ HOSP IP/OBS MODERATE 35: CPT | Performed by: NURSE PRACTITIONER

## 2019-10-12 PROCEDURE — 63710000001 INSULIN LISPRO (HUMAN) PER 5 UNITS: Performed by: NURSE PRACTITIONER

## 2019-10-12 PROCEDURE — 25010000002 MEROPENEM PER 100 MG: Performed by: INTERNAL MEDICINE

## 2019-10-12 PROCEDURE — 82962 GLUCOSE BLOOD TEST: CPT

## 2019-10-12 PROCEDURE — 63710000001 INSULIN DETEMIR PER 5 UNITS: Performed by: PHYSICIAN ASSISTANT

## 2019-10-12 PROCEDURE — 25010000002 MEROPENEM: Performed by: INTERNAL MEDICINE

## 2019-10-12 PROCEDURE — 80048 BASIC METABOLIC PNL TOTAL CA: CPT | Performed by: INTERNAL MEDICINE

## 2019-10-12 PROCEDURE — 85025 COMPLETE CBC W/AUTO DIFF WBC: CPT | Performed by: INTERNAL MEDICINE

## 2019-10-12 RX ADMIN — RENAGEL 800 MG: 800 TABLET ORAL at 17:04

## 2019-10-12 RX ADMIN — SODIUM CHLORIDE, PRESERVATIVE FREE 10 ML: 5 INJECTION INTRAVENOUS at 21:11

## 2019-10-12 RX ADMIN — AMLODIPINE BESYLATE 10 MG: 10 TABLET ORAL at 08:30

## 2019-10-12 RX ADMIN — MEROPENEM 1 G: 1 INJECTION INTRAVENOUS at 01:48

## 2019-10-12 RX ADMIN — INSULIN LISPRO 5 UNITS: 100 INJECTION, SOLUTION INTRAVENOUS; SUBCUTANEOUS at 08:30

## 2019-10-12 RX ADMIN — METOPROLOL TARTRATE 100 MG: 100 TABLET ORAL at 08:30

## 2019-10-12 RX ADMIN — DULOXETINE HYDROCHLORIDE 30 MG: 30 CAPSULE, DELAYED RELEASE ORAL at 08:30

## 2019-10-12 RX ADMIN — INSULIN LISPRO 2 UNITS: 100 INJECTION, SOLUTION INTRAVENOUS; SUBCUTANEOUS at 08:30

## 2019-10-12 RX ADMIN — CEFTRIAXONE 2 G: 2 INJECTION, POWDER, FOR SOLUTION INTRAMUSCULAR; INTRAVENOUS at 17:04

## 2019-10-12 RX ADMIN — TERAZOSIN HYDROCHLORIDE ANHYDROUS 5 MG: 5 CAPSULE ORAL at 20:20

## 2019-10-12 RX ADMIN — INSULIN LISPRO 7 UNITS: 100 INJECTION, SOLUTION INTRAVENOUS; SUBCUTANEOUS at 12:15

## 2019-10-12 RX ADMIN — RENAGEL 800 MG: 800 TABLET ORAL at 08:30

## 2019-10-12 RX ADMIN — INSULIN LISPRO 7 UNITS: 100 INJECTION, SOLUTION INTRAVENOUS; SUBCUTANEOUS at 17:03

## 2019-10-12 RX ADMIN — MEROPENEM 1 G: 1 INJECTION INTRAVENOUS at 11:51

## 2019-10-12 RX ADMIN — INSULIN DETEMIR 20 UNITS: 100 INJECTION, SOLUTION SUBCUTANEOUS at 20:31

## 2019-10-12 RX ADMIN — RENAGEL 800 MG: 800 TABLET ORAL at 12:15

## 2019-10-12 RX ADMIN — METOPROLOL TARTRATE 100 MG: 100 TABLET ORAL at 20:20

## 2019-10-12 RX ADMIN — INSULIN DETEMIR 20 UNITS: 100 INJECTION, SOLUTION SUBCUTANEOUS at 08:31

## 2019-10-12 RX ADMIN — INSULIN LISPRO 2 UNITS: 100 INJECTION, SOLUTION INTRAVENOUS; SUBCUTANEOUS at 12:15

## 2019-10-12 RX ADMIN — SODIUM CHLORIDE 100 ML/HR: 9 INJECTION, SOLUTION INTRAVENOUS at 16:01

## 2019-10-12 NOTE — PROGRESS NOTES
"    [facility] Medicine Services  PROGRESS NOTE    Patient Name: Kendrick Crystal  : 1970  MRN: [id number]    Date of Admission: 10/8/2019  Primary Care Physician: No primary care provider on file.    Subjective   Subjective     CC:  Right flank pain     HPI:  Seen resting up in bed in NAD.  Dozing intermittently.  Wife sleeping at bs.  Pt states he feels okay.  No n/v, abd pain, cp or soa.  Pain \"okay\".  Short irritable answers. States he just wants to sleep.  Didn't sleep well last night.  Childs in place.  No new issues.     Review of Systems  CV- No chest pain, palpitations  Resp- No cough, dyspnea  GI- No N/V/D, abd pain  Abd- no n/v, no abd pain, no diarrhea     All other systems reviewed and negative.     Objective   Objective     Vital Signs:   Temp:  [98.1 °F (36.7 °C)-99 °F (37.2 °C)] 98.1 °F (36.7 °C)  Heart Rate:  [74-84] 76  Resp:  [18] 18  BP: (129-145)/(75-95) 136/85        Physical Exam:  Constitutional: No acute distress, sleepy, but answering questions appropriately.  Wife sleeping at bs.    HENT: NCAT, mucous membranes moist  Respiratory: Clear to auscultation bilaterally but slightly decreased at bases, respiratory effort normal on RA.    Cardiovascular: no murmurs, rubs, or gallops, palpable pedal pulses bilaterally, no edema in RLE  Gastrointestinal: Positive bowel sounds, soft, nontender, nondistended. Obese   : childs cath in place to BSD. Urine clear nasreen  Musculoskeletal: old left BKA c/d/i.  LLE with trace edema.    Psychiatric: flat affect, cooperative  Neurologic: Oriented x 3, strength symmetric in all extremities, Cranial Nerves grossly intact to confrontation, speech clear and appropriate.  Follows commands   Skin: No rashes    Results Reviewed:    Results from last 7 days   Lab Units 10/12/19  0449 10/11/19  0500 10/10/19  0444 10/09/19  0940 10/09/19  0331   WBC 10*3/mm3 14.52* 17.49* 25.20*  --  29.99*   HEMOGLOBIN g/dL 9.1* 9.4* 9.6*  --  10.2* "   HEMATOCRIT % 28.3* 28.5* 29.7*  --  31.2*   PLATELETS 10*3/mm3 234 183 208  --  235   INR   --   --   --  1.17*  --    PROCALCITONIN ng/mL  --   --   --   --  1.60*     Results from last 7 days   Lab Units 10/12/19  0449 10/11/19  2155 10/11/19  0500 10/10/19  0444 10/09/19  0331 10/08/19  2008   SODIUM mmol/L 130*  --  128* 129* 125* 124*   POTASSIUM mmol/L 4.0 3.8 3.5 3.6 3.6 4.3   CHLORIDE mmol/L 98  --  93* 92* 88* 84*   CO2 mmol/L 24.0  --  24.0 24.0 22.0 23.0   BUN mg/dL 15  --  19 25* 29* 33*   CREATININE mg/dL 0.48*  --  0.53* 0.73* 0.75* 0.85   GLUCOSE mg/dL 206*  --  252* 170* 341* 449*   CALCIUM mg/dL 7.6*  --  7.8* 8.1* 8.4* 9.2   ALT (SGPT) U/L  --   --   --   --   --  10   AST (SGOT) U/L  --   --   --   --   --  9   TROPONIN T ng/mL  --   --   --   --  0.031* 0.042*     Estimated Creatinine Clearance: 245.9 mL/min (A) (by C-G formula based on SCr of 0.48 mg/dL (L)).    Microbiology Results Abnormal     Procedure Component Value - Date/Time    Urine Culture - Urine, Urine, Clean Catch [[id number]]  (Abnormal)  (Susceptibility) Collected:  10/08/19 2256    Lab Status:  Final result Specimen:  Urine, Clean Catch Updated:  10/12/19 0931     Urine Culture >100,000 CFU/mL Klebsiella pneumoniae ssp pneumoniae      >100,000 CFU/mL Escherichia coli    Susceptibility      Klebsiella pneumoniae ssp pneumoniae     EYAD     Ampicillin Resistant     Ampicillin + Sulbactam Susceptible     Cefazolin Susceptible     Cefepime Susceptible     Ceftazidime Susceptible     Ceftriaxone Susceptible     Gentamicin Susceptible     Levofloxacin Susceptible     Nitrofurantoin Intermediate     Piperacillin + Tazobactam Susceptible     Tetracycline Susceptible     Trimethoprim + Sulfamethoxazole Susceptible                Susceptibility      Escherichia coli     EYAD     Ampicillin Resistant     Ampicillin + Sulbactam Intermediate     Cefazolin Susceptible     Cefepime Susceptible     Ceftazidime Susceptible     Ceftriaxone  Susceptible     Gentamicin Susceptible     Levofloxacin Resistant     Nitrofurantoin Susceptible     Piperacillin + Tazobactam Susceptible     Tetracycline Susceptible     Trimethoprim + Sulfamethoxazole Susceptible                    Anaerobic Culture - Body Fluid, Peritoneum [[id number]] Collected:  10/09/19 1508    Lab Status:  Preliminary result Specimen:  Body Fluid from Peritoneum Updated:  10/12/19 0928     Anaerobic Culture No anaerobes isolated at 3 days    Body Fluid Culture - Body Fluid, Peritoneum [[id number]]  (Abnormal)  (Susceptibility) Collected:  10/09/19 1508    Lab Status:  Final result Specimen:  Body Fluid from Peritoneum Updated:  10/11/19 0621     Body Fluid Culture Moderate growth (3+) Klebsiella pneumoniae ssp pneumoniae     Gram Stain Many (4+) WBCs per low power field      Moderate (3+) Gram negative bacilli    Susceptibility      Klebsiella pneumoniae ssp pneumoniae     EYAD     Ampicillin Resistant     Ampicillin + Sulbactam Susceptible     Cefazolin Susceptible     Cefepime Susceptible     Ceftazidime Susceptible     Ceftriaxone Susceptible     Gentamicin Susceptible     Levofloxacin Susceptible     Piperacillin + Tazobactam Susceptible     Trimethoprim + Sulfamethoxazole Susceptible                    Blood Culture - Blood, Arm, Right [[id number]]  (Abnormal) Collected:  10/08/19 2012    Lab Status:  Final result Specimen:  Blood from Arm, Right Updated:  10/10/19 2122     Blood Culture Klebsiella pneumoniae ssp pneumoniae     Isolated from Aerobic and Anaerobic Bottles     Gram Stain Aerobic Bottle Gram negative bacilli      Anaerobic Bottle Gram negative bacilli    Narrative:       Refer to blood culture collected 10/8/2019 at 2000 for MICs.    Blood Culture - Blood, Arm, Left [[id number]]  (Abnormal)  (Susceptibility) Collected:  10/08/19 2000    Lab Status:  Final result Specimen:  Blood from Arm, Left Updated:  10/10/19 2122     Blood Culture Klebsiella pneumoniae ssp pneumoniae      Isolated from Aerobic and Anaerobic Bottles     Gram Stain Anaerobic Bottle Gram negative bacilli      Aerobic Bottle Gram negative bacilli    Susceptibility      Klebsiella pneumoniae ssp pneumoniae     EYAD     Ampicillin Resistant     Ampicillin + Sulbactam Susceptible     Cefazolin Susceptible     Cefepime Susceptible     Ceftazidime Susceptible     Ceftriaxone Susceptible     Gentamicin Susceptible     Levofloxacin Susceptible     Piperacillin + Tazobactam Susceptible     Trimethoprim + Sulfamethoxazole Susceptible                    Blood Culture ID, PCR - Blood, Arm, Left [[id number]]  (Abnormal) Collected:  10/08/19 2000    Lab Status:  Final result Specimen:  Blood from Arm, Left Updated:  10/09/19 1038     BCID, PCR Klebsiella pneumoniae. Identification by BCID PCR.          Imaging Results (last 24 hours)     ** No results found for the last 24 hours. **          Results for orders placed during the hospital encounter of 05/18/19   Adult Transthoracic Echo Complete W/ Cont if Necessary Per Protocol    Narrative · Estimated EF = 60%.  · Mild mitral valve regurgitation is present  · Mild tricuspid valve regurgitation is present.  · Calculated right ventricular systolic pressure from tricuspid   regurgitation is 29 mmHg.          I have reviewed the medications:  Scheduled Meds:    amLODIPine 10 mg Oral Q24H   DULoxetine 30 mg Oral Daily   insulin detemir 20 Units Subcutaneous Q12H   insulin lispro 0-9 Units Subcutaneous 4x Daily With Meals & Nightly   insulin lispro 5 Units Subcutaneous TID With Meals   meropenem 1 g Intravenous Q8H   metoprolol tartrate 100 mg Oral Q12H   sevelamer 800 mg Oral TID With Meals   sodium chloride 10 mL Intravenous Q12H   terazosin 5 mg Oral Nightly     Continuous Infusions:    sodium chloride 100 mL/hr Last Rate: 100 mL/hr (10/12/19 0831)     PRN Meds:.•  acetaminophen **OR** acetaminophen **OR** acetaminophen  •  dextrose  •  dextrose  •  glucagon (human recombinant)  •   HYDROcodone-acetaminophen  •  LORazepam  •  melatonin  •  ondansetron  •  potassium chloride **OR** potassium chloride **OR** potassium chloride  •  sodium chloride      Assessment/Plan   Assessment / Plan     Active Hospital Problems    Diagnosis  POA   • **Perinephric abscess [N15.1]  Yes   • Bacteremia due to Klebsiella pneumoniae [R78.81]  Unknown   • Acute UTI (urinary tract infection) [N39.0]  Yes   • Gastroparesis [K31.84]  Yes   • S/P BKA (below knee amputation), left (CMS/HCC) [Z89.512]  Not Applicable   • Type 2 diabetes mellitus with circulatory disorder and uncontrolled [E11.59]  Yes   • DUNLAP Cirrhosis [K74.60]  Yes   • Essential hypertension [I10]  Yes   • Hyperlipemia [E78.5]  Yes   • Non-compliance [Z91.14]  Not Applicable      Resolved Hospital Problems   No resolved problems to display.        Brief Hospital Course to date:  Kendrick Crystal is a 49 y.o. male  50 yo gentleman new patient for me today, hx of T2DM, hypertension, hx of multiple skin/abscesses/MRSA infection, DUNLAP cirrhosis, PVD s/p L BKA, and  presented with sepsis 2/2 renal and perinephric abscess s/p IR guided drainage of abscess culture growing Klebsiella pneumoniae currently on meropenem since 10/9.    Assessment/Plan:    Sepsis 2/2 perinephric abscess and UTI   Klebsiella pneuomniae bacteremia  -ID following, on meropenem   -cultures from peritoneum and blood cultures from 10/10 growing Klebsiella resistant to ampicillin, if urine culture also growing klebsiella pneumoniae can de-escalate to IV ceftriaxone   --urology consulted  --CRS consulted  --am labs      Right lower extremity pain above knee:   no edema or redness on  Exam, ckd duplex RLE to rule out DVT, was negative     BPH: continue terazosin qnightly  Intermittent hematuria  --Urology/CRS following  --childs     HTN: continue lopressor 100 mg q12h and amlodipine 10 mg q 24h    T2DM poorly controlled  (A1c 14.10)  Glucoses running 170-211  continue levemir 20u sc q12h and  correction dose insulin.   Will increase mealtime insulin today from 5 units tid to 7 units tid ac with hold parameters     DVT Prophylaxis:  mechanical    Disposition: I expect the patient to be discharged when appropriate antibiotic selection can be made, plan for SNF upon discharge. CM on board     CODE STATUS:   Code Status and Medical Interventions:   Ordered at: 10/08/19 1733     Level Of Support Discussed With:    Patient     Code Status:    CPR     Medical Interventions (Level of Support Prior to Arrest):    Full         Electronically signed by CHINTAN Gallagher, 10/12/19, 11:54 AM.

## 2019-10-12 NOTE — PLAN OF CARE
Problem: Fall Risk (Adult)  Goal: Absence of Fall  Outcome: Ongoing (interventions implemented as appropriate)   10/12/19 0425   Fall Risk (Adult)   Absence of Fall making progress toward outcome       Problem: Skin Injury Risk (Adult)  Goal: Skin Health and Integrity  Outcome: Ongoing (interventions implemented as appropriate)   10/12/19 0425   Skin Injury Risk (Adult)   Skin Health and Integrity making progress toward outcome       Problem: Pain, Acute (Adult)  Goal: Acceptable Pain Control/Comfort Level  Outcome: Ongoing (interventions implemented as appropriate)   10/12/19 0425   Pain, Acute (Adult)   Acceptable Pain Control/Comfort Level making progress toward outcome

## 2019-10-12 NOTE — PLAN OF CARE
Problem: Patient Care Overview  Goal: Plan of Care Review  Outcome: Ongoing (interventions implemented as appropriate)   10/12/19 1628   Plan of Care Review   Progress no change   OTHER   Outcome Summary Pt able to use BSC with lift today. Pt has no c/o pain, nausea, or soa this shift. Pt cont ATBX. VSS. No new concerns at this time.    Coping/Psychosocial   Plan of Care Reviewed With patient     Goal: Individualization and Mutuality  Outcome: Outcome(s) achieved Date Met: 10/12/19   10/12/19 1628   Individualization   Patient Specific Preferences pt likes door shut, lights off when resting, pt likes to be up late at night, naps in day   Patient Specific Goals (Include Timeframe) home at [place]   Patient Specific Interventions enc oob, enc therapies   Mutuality/Individual Preferences   What Anxieties, Fears, Concerns, or Questions Do You Have About Your Care? none   What Information Would Help Us Give You More Personalized Care? none   How Would You and/or Your Support Person Like to Participate in Your Care? none   Mutuality/Individual Preferences   How to Address Anxieties/Fears none       Problem: Fall Risk (Adult)  Goal: Absence of Fall  Outcome: Ongoing (interventions implemented as appropriate)   10/12/19 1628   Fall Risk (Adult)   Absence of Fall making progress toward outcome       Problem: Skin Injury Risk (Adult)  Goal: Skin Health and Integrity  Outcome: Ongoing (interventions implemented as appropriate)   10/12/19 1628   Skin Injury Risk (Adult)   Skin Health and Integrity making progress toward outcome       Problem: Pain, Acute (Adult)  Goal: Acceptable Pain Control/Comfort Level  Outcome: Ongoing (interventions implemented as appropriate)   10/12/19 1628   Pain, Acute (Adult)   Acceptable Pain Control/Comfort Level making progress toward outcome       Problem: Infection, Risk/Actual (Adult)  Goal: Identify Related Risk Factors and Signs and Symptoms  Outcome: Ongoing (interventions implemented as  appropriate)   10/10/19 0549   Infection, Risk/Actual (Adult)   Related Risk Factors (Infection, Risk/Actual) skin integrity impairment;immunosuppressed;chronic illness/condition   Signs and Symptoms (Infection, Risk/Actual) blood glucose changes;cultures positive;body temperature changes     Goal: Infection Prevention/Resolution  Outcome: Ongoing (interventions implemented as appropriate)   10/12/19 9867   Infection, Risk/Actual (Adult)   Infection Prevention/Resolution making progress toward outcome

## 2019-10-12 NOTE — PROGRESS NOTES
[facility] Progress Note        Antibiotics:  Anti-Infectives (From admission, onward)    Ordered     Dose/Rate Route Frequency Start Stop    10/09/19 1136  meropenem (MERREM) 1 g/100 mL 0.9% NS VTB (mbp)     Ordering Provider:  Usman Dong MD    1 g  over 3 Hours Intravenous Every 8 Hours 10/09/19 1800 10/19/19 1759    10/09/19 1136  meropenem (MERREM) 1 g/100 mL 0.9% NS VTB (mbp)     Ordering Provider:  Usman Dong MD    1 g  over 30 Minutes Intravenous Once 10/09/19 1230 10/09/19 1251    10/08/19 2338  piperacillin-tazobactam (ZOSYN) 3.375 g in iso-osmotic dextrose 50 ml (premix)     Ordering Provider:  Yasmany Martinez PA-C    3.375 g  over 30 Minutes Intravenous Once 10/08/19 2340 10/09/19 0016          CC: fatigue    HPI:  Patient is a 49 y.o.  Yr old male with history of poorly contolled T2DM, DUNLAP/liver cirrhosis, HTN, PVD/Left BKA, and multiple skin abscesses/MRSA who presented to [facility] ED with right shin wound infection summer 2018.  He was unable to get to see Dr. Martinez as his father passed away unexpectedly on 18 and he had to wait until after the .  The wound worsened becoming gangrenous and he came to [facility] ED in 2018.  He also had been hospitalized at [facility] and then transferred to  for a severe penis/scrotum infection requiring surgical debridement in summer 2018.  He received antibiotics regarding his right leg infection, generally improved over the remainder of summer 2018 but that area had not completely healed. He was admitted 2019 with acute left lower abdominal wall redness/swelling and pain, spontaneous drainage associated with fever/chills and uncontrolled blood sugars.   I&D requiring wide debridement , severe/deep infection and transferred to [facility] on May 3 with IV Zosyn/oral doxycycline. Cultures had lactobacillus and mixed gram-positive skin sherly including alpha strep, staph epidermidis and  corynebacterium. Readmitted to [facility] May 18, 2019, increasing generalized edema ultimately transitioned to oral doxycycline and healed.     He presented to the emergency room July 30, 2019 with acute rectal pain that had begun 7/27; this is associated with constipation for days, chills and nausea/dry heaving.  White blood cell count elevated, high hemoglobin A1c over 13, urinalysis with hematuria/pyuria and CT scan with 3 x 3 cm fluid collection anterior to the distal rectum and per discussion with Dr. Akbar/Jennifer, this is not in the prostate.  Colorectal surgery/urology saw him for consideration of surgical options/timing/threshold, etc..     8/2/19 I&Dper Dr Payne perirectal abscess; patient reports that he had instrumented his rectum prior to hospitalization with enema     8/3/19 urinary retention overnight requiring in/out catheterization per patient.  Creat climb     8/4/19 further creat climb; childs placed and lisinopril stopped and d/w Dr Rubio;  ?obstruction initially associated with retention postop (1200 out with I/O cath postop per nursing) -v- contrast from CT -v- lisinopril/medication/vancomycin -v- relative hypotension -v- likely combination of factors with ATN; nephrology following; vancomycin trough high and vancomycin stopped empirically 8/3 although high trough potentially accumulation as a consequence of diminishing renal function associated with retention/contrast/pressure rather than cause.  Nonetheless, avoid for now; creatinine trending down.        8/7 in retrospect he remembers air in his urine at admit which has raised concern for possible fistula from urinary tract;  No fecaluria and culture from abscess is fecal sherly;  ?rectal-urethral fistula;  Dr Payne aware     Culture with E. coli/Klebsiella species and creatinine generally trended down, transitioned to oral antibiotics at discharge.     Readmitted August 17, 2019 with nausea/vomiting/dry heaves for 3 days.  Childs  "catheter was placed and CT scan of the abdomen showed:      \"Previously seen fluid collection identified inferior to the prostate gland is more increased in density on today's examination. There is wall thickening again seen throughout the bladder. Findings again are concerning for cystitis.\" per radiology     He was transitioned to oral omnicef/topical antifungal on approx 8/23/19; Noncompliant with f/u in our office     READMITTED 10/8/19 RLQ abd pain, urinary retention, nausea, dysuria and generalized fatigue     UTI by U/A, subsequent blood cultures positive for GNR and initial PCR with kleb sp, no carbapenem resistance by initial PCR per my d/w micro;  Abnormal CT abd with right perinephric fluid collection, advanced cirrhosis, urinary bladder thickening.  10/9 Aspirate of perinephric fluid collection consistent with abscess/gram-negative lon and white blood cells     10/10/19 generally fatigued.  He currently denies abd pain;  Walker in and no overt hematuria or pyuria;  He denied any  fecaluria prior to admit to me.  He had complained of air in his urine to Dr. Payne.    10/11/19:  Covering for Dr. Dong.  Pt spiking fever last evening but none since.  Did not feel poorly with the fever.  Walker in place.  No n/v/d.  No rashes.  Tolerating meropenem.  C/o pain behind right knee.     No headache photophobia or neck stiffness.  No shortness of breath cough or hemoptysis.  No diarrhea.  No hematochezia melena or hematemesis.  No skin rash.    10/12/19:  No fevers overnight or today.  Feeling a bit stronger daily.  No n/v or diarrhea.  No abd pain.  No rashes.     ROS:      No f/c/s. No n/v/d. No rash. No new ADR to Abx.       PE:   /85 (BP Location: Right arm, Patient Position: Lying)   Pulse 76   Temp 98.1 °F (36.7 °C) (Oral)   Resp 18   Ht 190.5 cm (75\")   Wt 112 kg (246 lb)   SpO2 93%   BMI 30.75 kg/m²     GENERAL: awake, in no acute distress.   HEENT: Normocephalic, atraumatic.  PERRL. EOMI. No " conjunctival injection. No icterus. Oropharynx clear without evidence of thrush or exudate. No evidence of peridontal disease.    HEART: RRR; No murmur, rubs, gallops.   LUNGS: Diminished at bases bilaterally without wheezing, rales, rhonchi. Normal respiratory effort. Nonlabored.  ABDOMEN: Soft, nontender, nondistended. Positive bowel sounds. No rebound or guarding.  EXT:  No cyanosis, clubbing or edema.  : +childs  with clear yellow urine.   MSK: Amputee status noted with left BKA  SKIN: Warm and dry without cutaneous eruptions on Inspection/palpation.  Scabs on right shin.  NEURO: awake and alert.    Laboratory Data    Results from last 7 days   Lab Units 10/12/19  0449 10/11/19  0500 10/10/19  0444   WBC 10*3/mm3 14.52* 17.49* 25.20*   HEMOGLOBIN g/dL 9.1* 9.4* 9.6*   HEMATOCRIT % 28.3* 28.5* 29.7*   PLATELETS 10*3/mm3 234 183 208     Results from last 7 days   Lab Units 10/12/19  0449   SODIUM mmol/L 130*   POTASSIUM mmol/L 4.0   CHLORIDE mmol/L 98   CO2 mmol/L 24.0   BUN mg/dL 15   CREATININE mg/dL 0.48*   GLUCOSE mg/dL 206*   CALCIUM mg/dL 7.6*     Results from last 7 days   Lab Units 10/08/19  2008   ALK PHOS U/L 207*   BILIRUBIN mg/dL 0.4   ALT (SGPT) U/L 10   AST (SGOT) U/L 9               Estimated Creatinine Clearance: 245.9 mL/min (A) (by C-G formula based on SCr of 0.48 mg/dL (L)).      Microbiology:    Microbiology Results (last 10 days)     Procedure Component Value - Date/Time    Anaerobic Culture - Body Fluid, Peritoneum [[id number]] Collected:  10/09/19 1508    Lab Status:  Preliminary result Specimen:  Body Fluid from Peritoneum Updated:  10/12/19 0928     Anaerobic Culture No anaerobes isolated at 3 days    Body Fluid Culture - Body Fluid, Peritoneum [[id number]]  (Abnormal)  (Susceptibility) Collected:  10/09/19 1508    Lab Status:  Final result Specimen:  Body Fluid from Peritoneum Updated:  10/11/19 0621     Body Fluid Culture Moderate growth (3+) Klebsiella pneumoniae ssp pneumoniae     Gram  Stain Many (4+) WBCs per low power field      Moderate (3+) Gram negative bacilli    Susceptibility      Klebsiella pneumoniae ssp pneumoniae     EYAD     Ampicillin Resistant     Ampicillin + Sulbactam Susceptible     Cefazolin Susceptible     Cefepime Susceptible     Ceftazidime Susceptible     Ceftriaxone Susceptible     Gentamicin Susceptible     Levofloxacin Susceptible     Piperacillin + Tazobactam Susceptible     Trimethoprim + Sulfamethoxazole Susceptible                    Urine Culture - Urine, Urine, Clean Catch [[id number]]  (Abnormal)  (Susceptibility) Collected:  10/08/19 2256    Lab Status:  Final result Specimen:  Urine, Clean Catch Updated:  10/12/19 0931     Urine Culture >100,000 CFU/mL Klebsiella pneumoniae ssp pneumoniae      >100,000 CFU/mL Escherichia coli    Susceptibility      Klebsiella pneumoniae ssp pneumoniae     EYAD     Ampicillin Resistant     Ampicillin + Sulbactam Susceptible     Cefazolin Susceptible     Cefepime Susceptible     Ceftazidime Susceptible     Ceftriaxone Susceptible     Gentamicin Susceptible     Levofloxacin Susceptible     Nitrofurantoin Intermediate     Piperacillin + Tazobactam Susceptible     Tetracycline Susceptible     Trimethoprim + Sulfamethoxazole Susceptible                Susceptibility      Escherichia coli     EYAD     Ampicillin Resistant     Ampicillin + Sulbactam Intermediate     Cefazolin Susceptible     Cefepime Susceptible     Ceftazidime Susceptible     Ceftriaxone Susceptible     Gentamicin Susceptible     Levofloxacin Resistant     Nitrofurantoin Susceptible     Piperacillin + Tazobactam Susceptible     Tetracycline Susceptible     Trimethoprim + Sulfamethoxazole Susceptible                    Blood Culture - Blood, Arm, Right [[id number]]  (Abnormal) Collected:  10/08/19 2012    Lab Status:  Final result Specimen:  Blood from Arm, Right Updated:  10/10/19 2122     Blood Culture Klebsiella pneumoniae ssp pneumoniae     Isolated from Aerobic and  Anaerobic Bottles     Gram Stain Aerobic Bottle Gram negative bacilli      Anaerobic Bottle Gram negative bacilli    Narrative:       Refer to blood culture collected 10/8/2019 at 2000 for MICs.    Blood Culture - Blood, Arm, Left [[id number]]  (Abnormal)  (Susceptibility) Collected:  10/08/19 2000    Lab Status:  Final result Specimen:  Blood from Arm, Left Updated:  10/10/19 2122     Blood Culture Klebsiella pneumoniae ssp pneumoniae     Isolated from Aerobic and Anaerobic Bottles     Gram Stain Anaerobic Bottle Gram negative bacilli      Aerobic Bottle Gram negative bacilli    Susceptibility      Klebsiella pneumoniae ssp pneumoniae     EYAD     Ampicillin Resistant     Ampicillin + Sulbactam Susceptible     Cefazolin Susceptible     Cefepime Susceptible     Ceftazidime Susceptible     Ceftriaxone Susceptible     Gentamicin Susceptible     Levofloxacin Susceptible     Piperacillin + Tazobactam Susceptible     Trimethoprim + Sulfamethoxazole Susceptible                    Blood Culture ID, PCR - Blood, Arm, Left [[id number]]  (Abnormal) Collected:  10/08/19 2000    Lab Status:  Final result Specimen:  Blood from Arm, Left Updated:  10/09/19 1038     BCID, PCR Klebsiella pneumoniae. Identification by BCID PCR.            Radiology:  No radiology results for the last day        Impression:      --Acute Klebsiella pneumoniae septicemia/sepsis, likely urinary source/pyelnoephritis (urine cx with Klebsiella pneumoniae and E. Coli) associated with urinary retention and  with abnormal CT scan with perinephric collection/abscess and Klebsiella on aspirate;  IV abx ongoing; urology to determine any timing/option or threshold for further intervention or functional/anatomic assessment.     --Hx perirectal abscess ; Colorectal surgery, Dr. Payne previously saw and is following.  Drainage as above on August 2 with mxed Fecal sherly in culture; CT scans  with no mention of abscess on 8/18 study;  Dr Payne  following given urology  conern for fistula and to help determine any timing/option or threshold for further GI/colorectal work-up     --History urinary retention.     --Hx ARF in August 2019;  ?obstruction initially associated with retention postop (1200 out with I/O cath postop per nursing) -v- contrast from CT -v- lisinopril/medication/vancomycin -v- relative hypotension -v- likely combination of factors with ATN;  vancomycin trough high and vancomycin stopped empirically 8/3 although high trough potentially accumulation as a consequence of diminishing renal function associated with retention/contrast/pressure rather than cause.  Nonetheless, avoid for now; likely further acute kidney injury at admission August 17 associated with dehydration/prerenal/ATN etiology     --History MRSA;  Not in current culture     --Diabetes mellitus type 2, uncontrolled.  He reports the reason for this is forgetfulness     --History [place] and chronic liver disease/cirrhosis  per past notes     --Left BKA     --Peripheral vascular disease     --medical noncompliance      PLAN:     --de-escalate meropenem to ceftriaxone    --urology evaluation pending.  May need cystoscopy to assess for fistula.      --Discussed yesterday with Dr. Payne.  Patient poor surgical candidate and attempted to treat conservatively with abx for now.    --Monitor WBC and fever curve.  WBC improved again today and fevers resolved.    Complex case.    Ayad Blount MD  10/12/2019  1:35 PM

## 2019-10-13 LAB
ANION GAP SERPL CALCULATED.3IONS-SCNC: 8 MMOL/L (ref 5–15)
BASOPHILS # BLD AUTO: 0.06 10*3/MM3 (ref 0–0.2)
BASOPHILS NFR BLD AUTO: 0.5 % (ref 0–1.5)
BUN BLD-MCNC: 14 MG/DL (ref 6–20)
BUN/CREAT SERPL: 35 (ref 7–25)
CALCIUM SPEC-SCNC: 7.7 MG/DL (ref 8.6–10.5)
CHLORIDE SERPL-SCNC: 103 MMOL/L (ref 98–107)
CO2 SERPL-SCNC: 21 MMOL/L (ref 22–29)
CREAT BLD-MCNC: 0.4 MG/DL (ref 0.76–1.27)
DEPRECATED RDW RBC AUTO: 45.8 FL (ref 37–54)
EOSINOPHIL # BLD AUTO: 0.09 10*3/MM3 (ref 0–0.4)
EOSINOPHIL NFR BLD AUTO: 0.7 % (ref 0.3–6.2)
ERYTHROCYTE [DISTWIDTH] IN BLOOD BY AUTOMATED COUNT: 14.7 % (ref 12.3–15.4)
GFR SERPL CREATININE-BSD FRML MDRD: >150 ML/MIN/1.73
GLUCOSE BLD-MCNC: 208 MG/DL (ref 65–99)
GLUCOSE BLDC GLUCOMTR-MCNC: 123 MG/DL (ref 70–130)
GLUCOSE BLDC GLUCOMTR-MCNC: 142 MG/DL (ref 70–130)
GLUCOSE BLDC GLUCOMTR-MCNC: 164 MG/DL (ref 70–130)
GLUCOSE BLDC GLUCOMTR-MCNC: 181 MG/DL (ref 70–130)
HCT VFR BLD AUTO: 28.8 % (ref 37.5–51)
HGB BLD-MCNC: 9.2 G/DL (ref 13–17.7)
IMM GRANULOCYTES # BLD AUTO: 0.2 10*3/MM3 (ref 0–0.05)
IMM GRANULOCYTES NFR BLD AUTO: 1.5 % (ref 0–0.5)
LYMPHOCYTES # BLD AUTO: 1.54 10*3/MM3 (ref 0.7–3.1)
LYMPHOCYTES NFR BLD AUTO: 11.9 % (ref 19.6–45.3)
MCH RBC QN AUTO: 27.3 PG (ref 26.6–33)
MCHC RBC AUTO-ENTMCNC: 31.9 G/DL (ref 31.5–35.7)
MCV RBC AUTO: 85.5 FL (ref 79–97)
MONOCYTES # BLD AUTO: 1.04 10*3/MM3 (ref 0.1–0.9)
MONOCYTES NFR BLD AUTO: 8 % (ref 5–12)
NEUTROPHILS # BLD AUTO: 10.05 10*3/MM3 (ref 1.7–7)
NEUTROPHILS NFR BLD AUTO: 77.4 % (ref 42.7–76)
NRBC BLD AUTO-RTO: 0 /100 WBC (ref 0–0.2)
PLATELET # BLD AUTO: 264 10*3/MM3 (ref 140–450)
PMV BLD AUTO: 11.8 FL (ref 6–12)
POTASSIUM BLD-SCNC: 4.1 MMOL/L (ref 3.5–5.2)
RBC # BLD AUTO: 3.37 10*6/MM3 (ref 4.14–5.8)
SODIUM BLD-SCNC: 132 MMOL/L (ref 136–145)
WBC NRBC COR # BLD: 12.98 10*3/MM3 (ref 3.4–10.8)

## 2019-10-13 PROCEDURE — 99232 SBSQ HOSP IP/OBS MODERATE 35: CPT | Performed by: NURSE PRACTITIONER

## 2019-10-13 PROCEDURE — 63710000001 INSULIN DETEMIR PER 5 UNITS: Performed by: PHYSICIAN ASSISTANT

## 2019-10-13 PROCEDURE — 80048 BASIC METABOLIC PNL TOTAL CA: CPT | Performed by: NURSE PRACTITIONER

## 2019-10-13 PROCEDURE — 85025 COMPLETE CBC W/AUTO DIFF WBC: CPT | Performed by: NURSE PRACTITIONER

## 2019-10-13 PROCEDURE — 25010000002 CEFTRIAXONE PER 250 MG: Performed by: INTERNAL MEDICINE

## 2019-10-13 PROCEDURE — 82962 GLUCOSE BLOOD TEST: CPT

## 2019-10-13 RX ADMIN — SODIUM CHLORIDE 100 ML/HR: 9 INJECTION, SOLUTION INTRAVENOUS at 13:46

## 2019-10-13 RX ADMIN — SODIUM CHLORIDE, PRESERVATIVE FREE 10 ML: 5 INJECTION INTRAVENOUS at 11:16

## 2019-10-13 RX ADMIN — INSULIN LISPRO 7 UNITS: 100 INJECTION, SOLUTION INTRAVENOUS; SUBCUTANEOUS at 12:10

## 2019-10-13 RX ADMIN — INSULIN LISPRO 7 UNITS: 100 INJECTION, SOLUTION INTRAVENOUS; SUBCUTANEOUS at 08:49

## 2019-10-13 RX ADMIN — SODIUM CHLORIDE, PRESERVATIVE FREE 10 ML: 5 INJECTION INTRAVENOUS at 21:40

## 2019-10-13 RX ADMIN — RENAGEL 800 MG: 800 TABLET ORAL at 11:12

## 2019-10-13 RX ADMIN — METOPROLOL TARTRATE 100 MG: 100 TABLET ORAL at 21:38

## 2019-10-13 RX ADMIN — INSULIN DETEMIR 20 UNITS: 100 INJECTION, SOLUTION SUBCUTANEOUS at 21:39

## 2019-10-13 RX ADMIN — INSULIN LISPRO 2 UNITS: 100 INJECTION, SOLUTION INTRAVENOUS; SUBCUTANEOUS at 08:48

## 2019-10-13 RX ADMIN — AMLODIPINE BESYLATE 10 MG: 10 TABLET ORAL at 11:12

## 2019-10-13 RX ADMIN — METOPROLOL TARTRATE 100 MG: 100 TABLET ORAL at 11:13

## 2019-10-13 RX ADMIN — TERAZOSIN HYDROCHLORIDE ANHYDROUS 5 MG: 5 CAPSULE ORAL at 21:37

## 2019-10-13 RX ADMIN — DULOXETINE HYDROCHLORIDE 30 MG: 30 CAPSULE, DELAYED RELEASE ORAL at 17:17

## 2019-10-13 RX ADMIN — CEFTRIAXONE 2 G: 2 INJECTION, POWDER, FOR SOLUTION INTRAMUSCULAR; INTRAVENOUS at 15:25

## 2019-10-13 RX ADMIN — RENAGEL 800 MG: 800 TABLET ORAL at 08:48

## 2019-10-13 RX ADMIN — INSULIN DETEMIR 20 UNITS: 100 INJECTION, SOLUTION SUBCUTANEOUS at 11:14

## 2019-10-13 RX ADMIN — INSULIN LISPRO 2 UNITS: 100 INJECTION, SOLUTION INTRAVENOUS; SUBCUTANEOUS at 21:38

## 2019-10-13 RX ADMIN — RENAGEL 800 MG: 800 TABLET ORAL at 17:18

## 2019-10-13 RX ADMIN — SODIUM CHLORIDE 100 ML/HR: 9 INJECTION, SOLUTION INTRAVENOUS at 03:24

## 2019-10-13 NOTE — PLAN OF CARE
Problem: Patient Care Overview  Goal: Plan of Care Review  Outcome: Ongoing (interventions implemented as appropriate)   10/12/19 1628 10/12/19 2000 10/13/19 0705   Plan of Care Review   Progress no change --  --    OTHER   Outcome Summary --  --  Patient rested throughout shift. Patient refused to ambulate to bedside commode and insisted on using the mechanical lift. Patient did not complain or pain or N/V. Vital signs stable. will continue to monitorl.    Coping/Psychosocial   Plan of Care Reviewed With --  patient;spouse --

## 2019-10-13 NOTE — PROGRESS NOTES
[facility] Medicine Services  PROGRESS NOTE    Patient Name: Kendrick Crystal  : 1970  MRN: [id number]    Date of Admission: 10/8/2019  Primary Care Physician: No primary care provider on file.    Subjective   Subjective     CC:  Right flank pain     HPI:  Patient is resting in bed in NAD with family at bedside. Patient asleep but awakens easily. Diet poor per patient. Having BM. Patient denies pain. No acute events overnight per nursing.     Review of Systems  CV- No chest pain, palpitations  Resp- No cough, dyspnea  GI- No N/V/D, abd pain  Abd- no n/v, no abd pain, no diarrhea     All other systems reviewed and negative.     Objective   Objective     Vital Signs:   Temp:  [98.1 °F (36.7 °C)] 98.1 °F (36.7 °C)  Heart Rate:  [77-78] 78  Resp:  [17-18] 18  BP: (135-179)/() 151/93        Physical Exam:  Constitutional: No acute distress, sleepy, but answering questions appropriately.  Wife at bs.    HENT: NCAT, mucous membranes moist  Respiratory: Clear to auscultation bilaterally but slightly decreased at bases, respiratory effort normal on RA.    Cardiovascular: no murmurs, rubs, or gallops, palpable pedal pulses bilaterally, no edema in RLE  Gastrointestinal: Positive bowel sounds, soft, nontender, nondistended. Obese   : childs cath in place to BSD. Urine clear nasreen  Musculoskeletal: old left BKA c/d/i.  LLE with trace edema.  And old scabs   Psychiatric: flat affect, cooperative  Neurologic: Oriented x 3, strength symmetric in all extremities, Cranial Nerves grossly intact to confrontation, speech clear and appropriate.  Follows commands   Skin: No rashes      Results Reviewed:    Results from last 7 days   Lab Units 10/13/19  0400 10/12/19  0449 10/11/19  0500  10/09/19  0940 10/09/19  0331   WBC 10*3/mm3 12.98* 14.52* 17.49*   < >  --  29.99*   HEMOGLOBIN g/dL 9.2* 9.1* 9.4*   < >  --  10.2*   HEMATOCRIT % 28.8* 28.3* 28.5*   < >  --  31.2*   PLATELETS 10*3/mm3 264 234  183   < >  --  235   INR   --   --   --   --  1.17*  --    PROCALCITONIN ng/mL  --   --   --   --   --  1.60*    < > = values in this interval not displayed.     Results from last 7 days   Lab Units 10/13/19  0400 10/12/19  0449 10/11/19  2155 10/11/19  0500  10/09/19  0331 10/08/19  2008   SODIUM mmol/L 132* 130*  --  128*   < > 125* 124*   POTASSIUM mmol/L 4.1 4.0 3.8 3.5   < > 3.6 4.3   CHLORIDE mmol/L 103 98  --  93*   < > 88* 84*   CO2 mmol/L 21.0* 24.0  --  24.0   < > 22.0 23.0   BUN mg/dL 14 15  --  19   < > 29* 33*   CREATININE mg/dL 0.40* 0.48*  --  0.53*   < > 0.75* 0.85   GLUCOSE mg/dL 208* 206*  --  252*   < > 341* 449*   CALCIUM mg/dL 7.7* 7.6*  --  7.8*   < > 8.4* 9.2   ALT (SGPT) U/L  --   --   --   --   --   --  10   AST (SGOT) U/L  --   --   --   --   --   --  9   TROPONIN T ng/mL  --   --   --   --   --  0.031* 0.042*    < > = values in this interval not displayed.     Estimated Creatinine Clearance: 295.1 mL/min (A) (by C-G formula based on SCr of 0.4 mg/dL (L)).    Microbiology Results Abnormal     Procedure Component Value - Date/Time    Urine Culture - Urine, Urine, Clean Catch [[id number]]  (Abnormal)  (Susceptibility) Collected:  10/08/19 2257    Lab Status:  Final result Specimen:  Urine, Clean Catch Updated:  10/12/19 0931     Urine Culture >100,000 CFU/mL Klebsiella pneumoniae ssp pneumoniae      >100,000 CFU/mL Escherichia coli    Susceptibility      Klebsiella pneumoniae ssp pneumoniae     EYAD     Ampicillin Resistant     Ampicillin + Sulbactam Susceptible     Cefazolin Susceptible     Cefepime Susceptible     Ceftazidime Susceptible     Ceftriaxone Susceptible     Gentamicin Susceptible     Levofloxacin Susceptible     Nitrofurantoin Intermediate     Piperacillin + Tazobactam Susceptible     Tetracycline Susceptible     Trimethoprim + Sulfamethoxazole Susceptible                Susceptibility      Escherichia coli     EYAD     Ampicillin Resistant     Ampicillin + Sulbactam Intermediate      Cefazolin Susceptible     Cefepime Susceptible     Ceftazidime Susceptible     Ceftriaxone Susceptible     Gentamicin Susceptible     Levofloxacin Resistant     Nitrofurantoin Susceptible     Piperacillin + Tazobactam Susceptible     Tetracycline Susceptible     Trimethoprim + Sulfamethoxazole Susceptible                    Anaerobic Culture - Body Fluid, Peritoneum [[id number]] Collected:  10/09/19 1508    Lab Status:  Preliminary result Specimen:  Body Fluid from Peritoneum Updated:  10/12/19 0928     Anaerobic Culture No anaerobes isolated at 3 days    Body Fluid Culture - Body Fluid, Peritoneum [[id number]]  (Abnormal)  (Susceptibility) Collected:  10/09/19 1508    Lab Status:  Final result Specimen:  Body Fluid from Peritoneum Updated:  10/11/19 0621     Body Fluid Culture Moderate growth (3+) Klebsiella pneumoniae ssp pneumoniae     Gram Stain Many (4+) WBCs per low power field      Moderate (3+) Gram negative bacilli    Susceptibility      Klebsiella pneumoniae ssp pneumoniae     EYAD     Ampicillin Resistant     Ampicillin + Sulbactam Susceptible     Cefazolin Susceptible     Cefepime Susceptible     Ceftazidime Susceptible     Ceftriaxone Susceptible     Gentamicin Susceptible     Levofloxacin Susceptible     Piperacillin + Tazobactam Susceptible     Trimethoprim + Sulfamethoxazole Susceptible                    Blood Culture - Blood, Arm, Right [[id number]]  (Abnormal) Collected:  10/08/19 2012    Lab Status:  Final result Specimen:  Blood from Arm, Right Updated:  10/10/19 2122     Blood Culture Klebsiella pneumoniae ssp pneumoniae     Isolated from Aerobic and Anaerobic Bottles     Gram Stain Aerobic Bottle Gram negative bacilli      Anaerobic Bottle Gram negative bacilli    Narrative:       Refer to blood culture collected 10/8/2019 at 2000 for MICs.    Blood Culture - Blood, Arm, Left [[id number]]  (Abnormal)  (Susceptibility) Collected:  10/08/19 2000    Lab Status:  Final result Specimen:  Blood from  Arm, Left Updated:  10/10/19 2122     Blood Culture Klebsiella pneumoniae ssp pneumoniae     Isolated from Aerobic and Anaerobic Bottles     Gram Stain Anaerobic Bottle Gram negative bacilli      Aerobic Bottle Gram negative bacilli    Susceptibility      Klebsiella pneumoniae ssp pneumoniae     EYAD     Ampicillin Resistant     Ampicillin + Sulbactam Susceptible     Cefazolin Susceptible     Cefepime Susceptible     Ceftazidime Susceptible     Ceftriaxone Susceptible     Gentamicin Susceptible     Levofloxacin Susceptible     Piperacillin + Tazobactam Susceptible     Trimethoprim + Sulfamethoxazole Susceptible                    Blood Culture ID, PCR - Blood, Arm, Left [[id number]]  (Abnormal) Collected:  10/08/19 2000    Lab Status:  Final result Specimen:  Blood from Arm, Left Updated:  10/09/19 1038     BCID, PCR Klebsiella pneumoniae. Identification by BCID PCR.          Imaging Results (last 24 hours)     ** No results found for the last 24 hours. **          Results for orders placed during the hospital encounter of 05/18/19   Adult Transthoracic Echo Complete W/ Cont if Necessary Per Protocol    Narrative · Estimated EF = 60%.  · Mild mitral valve regurgitation is present  · Mild tricuspid valve regurgitation is present.  · Calculated right ventricular systolic pressure from tricuspid   regurgitation is 29 mmHg.          I have reviewed the medications:  Scheduled Meds:    amLODIPine 10 mg Oral Q24H   ceftriaxone 2 g Intravenous Q24H   DULoxetine 30 mg Oral Daily   insulin detemir 20 Units Subcutaneous Q12H   insulin lispro 0-9 Units Subcutaneous 4x Daily With Meals & Nightly   insulin lispro 7 Units Subcutaneous TID With Meals   metoprolol tartrate 100 mg Oral Q12H   sevelamer 800 mg Oral TID With Meals   sodium chloride 10 mL Intravenous Q12H   terazosin 5 mg Oral Nightly     Continuous Infusions:    sodium chloride 100 mL/hr Last Rate: 100 mL/hr (10/13/19 0324)     PRN Meds:.•  acetaminophen **OR**  acetaminophen **OR** acetaminophen  •  dextrose  •  dextrose  •  glucagon (human recombinant)  •  HYDROcodone-acetaminophen  •  LORazepam  •  melatonin  •  ondansetron  •  potassium chloride **OR** potassium chloride **OR** potassium chloride  •  sodium chloride      Assessment/Plan   Assessment / Plan     Active Hospital Problems    Diagnosis  POA   • **Perinephric abscess [N15.1]  Yes   • Bacteremia due to Klebsiella pneumoniae [R78.81]  Unknown   • Acute UTI (urinary tract infection) [N39.0]  Yes   • Gastroparesis [K31.84]  Yes   • S/P BKA (below knee amputation), left (CMS/HCC) [Z89.512]  Not Applicable   • Type 2 diabetes mellitus with circulatory disorder and uncontrolled [E11.59]  Yes   • DUNLAP Cirrhosis [K74.60]  Yes   • Essential hypertension [I10]  Yes   • Hyperlipemia [E78.5]  Yes   • Non-compliance [Z91.14]  Not Applicable      Resolved Hospital Problems   No resolved problems to display.        Brief Hospital Course to date:  Kendrick Crystal is a 49 y.o. male  48 yo gentleman new patient for me today, hx of T2DM, hypertension, hx of multiple skin/abscesses/MRSA infection, DUNLAP cirrhosis, PVD s/p L BKA, and  presented with sepsis 2/2 renal and perinephric abscess s/p IR guided drainage of abscess culture growing Klebsiella pneumoniae currently on meropenem since 10/9.    Assessment/Plan:    Sepsis 2/2 perinephric abscess and UTI   Klebsiella pneuomniae bacteremia  -ID following, on meropenem   -cultures from peritoneum and blood cultures from 10/10 growing Klebsiella resistant to ampicillin, if urine culture also growing klebsiella pneumoniae can de-escalate to IV ceftriaxone   -- abx de-escalated to rocephin   --urology consulted placed childs at bedside   --CRS consulted wants conservative management for now   --am labs     Hyponatremia   -- improved    -- suspect hypovolemic hyponatremia related to po intake cont IVF's for now   -- consider NAL if worsens     DUNLAP Cirrhosis  -- stable avoid  hepatotoxins      Right lower extremity pain above knee:   no edema or redness on  Exam, ckd duplex RLE to rule out DVT, was negative     BPH: continue terazosin qnightly  Intermittent hematuria  --Urology/CRS following  --childs placed by urology     HTN: continue lopressor 100 mg q12h and amlodipine 10 mg q 24h    T2DM poorly controlled  (A1c 14.10)  Glucoses running 121-208  continue levemir 20u sc q12h and correction dose insulin.   Will increase mealtime insulin today from 5 units tid to 7 units tid ac with hold parameters   -- Bg improved with changes on 10/12 monitor today     DVT Prophylaxis:  mechanical    Disposition: I expect the patient to be discharged when appropriate antibiotic selection can be made, plan for SNF upon discharge. CM on board     CODE STATUS:   Code Status and Medical Interventions:   Ordered at: 10/08/19 5731     Level Of Support Discussed With:    Patient     Code Status:    CPR     Medical Interventions (Level of Support Prior to Arrest):    Full         Electronically signed by CHINTAN Richardson, 10/13/19, 9:18 AM.

## 2019-10-13 NOTE — PLAN OF CARE
Problem: Patient Care Overview  Goal: Discharge Needs Assessment  Outcome: Ongoing (interventions implemented as appropriate)      Problem: Fall Risk (Adult)  Goal: Absence of Fall  Outcome: Ongoing (interventions implemented as appropriate)   10/13/19 0704   Fall Risk (Adult)   Absence of Fall making progress toward outcome       Problem: Skin Injury Risk (Adult)  Goal: Skin Health and Integrity  Outcome: Ongoing (interventions implemented as appropriate)   10/13/19 0704   Skin Injury Risk (Adult)   Skin Health and Integrity making progress toward outcome       Problem: Pain, Acute (Adult)  Goal: Acceptable Pain Control/Comfort Level  Outcome: Ongoing (interventions implemented as appropriate)   10/13/19 0704   Pain, Acute (Adult)   Acceptable Pain Control/Comfort Level making progress toward outcome       Problem: Infection, Risk/Actual (Adult)  Goal: Identify Related Risk Factors and Signs and Symptoms  Outcome: Ongoing (interventions implemented as appropriate)   10/10/19 0549   Infection, Risk/Actual (Adult)   Related Risk Factors (Infection, Risk/Actual) skin integrity impairment;immunosuppressed;chronic illness/condition   Signs and Symptoms (Infection, Risk/Actual) blood glucose changes;cultures positive;body temperature changes     Goal: Infection Prevention/Resolution  Outcome: Ongoing (interventions implemented as appropriate)   10/13/19 0704   Infection, Risk/Actual (Adult)   Infection Prevention/Resolution making progress toward outcome

## 2019-10-13 NOTE — PROGRESS NOTES
[facility] Progress Note        Antibiotics:  Ceftriaxone 2 g iv daily      CC: fatigue    HPI:  Patient is a 49 y.o.  Yr old male with history of poorly contolled T2DM, DUNLAP/liver cirrhosis, HTN, PVD/Left BKA, and multiple skin abscesses/MRSA who presented to [facility] ED with right shin wound infection summer 2018.  He was unable to get to see Dr. Martinez as his father passed away unexpectedly on 18 and he had to wait until after the .  The wound worsened becoming gangrenous and he came to [facility] ED in 2018.  He also had been hospitalized at [facility] and then transferred to  for a severe penis/scrotum infection requiring surgical debridement in summer 2018.  He received antibiotics regarding his right leg infection, generally improved over the remainder of summer 2018 but that area had not completely healed. He was admitted 2019 with acute left lower abdominal wall redness/swelling and pain, spontaneous drainage associated with fever/chills and uncontrolled blood sugars.   I&D requiring wide debridement , severe/deep infection and transferred to [facility] on May 3 with IV Zosyn/oral doxycycline. Cultures had lactobacillus and mixed gram-positive skin sherly including alpha strep, staph epidermidis and corynebacterium. Readmitted to [facility] May 18, 2019, increasing generalized edema ultimately transitioned to oral doxycycline and healed.     He presented to the emergency room 2019 with acute rectal pain that had begun ; this is associated with constipation for days, chills and nausea/dry heaving.  White blood cell count elevated, high hemoglobin A1c over 13, urinalysis with hematuria/pyuria and CT scan with 3 x 3 cm fluid collection anterior to the distal rectum and per discussion with Dr. Akbar/Jennifer, this is not in the prostate.  Colorectal surgery/urology saw him for consideration of surgical options/timing/threshold, etc..     19 I&Dper  "Dr Payne perirectal abscess; patient reports that he had instrumented his rectum prior to hospitalization with enema     8/3/19 urinary retention overnight requiring in/out catheterization per patient.  Creat climb     8/4/19 further creat climb; childs placed and lisinopril stopped and d/w Dr Rubio;  ?obstruction initially associated with retention postop (1200 out with I/O cath postop per nursing) -v- contrast from CT -v- lisinopril/medication/vancomycin -v- relative hypotension -v- likely combination of factors with ATN; nephrology following; vancomycin trough high and vancomycin stopped empirically 8/3 although high trough potentially accumulation as a consequence of diminishing renal function associated with retention/contrast/pressure rather than cause.  Nonetheless, avoid for now; creatinine trending down.        8/7 in retrospect he remembers air in his urine at admit which has raised concern for possible fistula from urinary tract;  No fecaluria and culture from abscess is fecal sherly;  ?rectal-urethral fistula;  Dr Payne aware     Culture with E. coli/Klebsiella species and creatinine generally trended down, transitioned to oral antibiotics at discharge.     Readmitted August 17, 2019 with nausea/vomiting/dry heaves for 3 days.  Childs catheter was placed and CT scan of the abdomen showed:      \"Previously seen fluid collection identified inferior to the prostate gland is more increased in density on today's examination. There is wall thickening again seen throughout the bladder. Findings again are concerning for cystitis.\" per radiology     He was transitioned to oral omnicef/topical antifungal on approx 8/23/19; Noncompliant with f/u in our office     READMITTED 10/8/19 RLQ abd pain, urinary retention, nausea, dysuria and generalized fatigue     UTI by U/A, subsequent blood cultures positive for GNR and initial PCR with kleb sp, no carbapenem resistance by initial PCR per my d/w micro;  Abnormal CT abd " "with right perinephric fluid collection, advanced cirrhosis, urinary bladder thickening.  10/9 Aspirate of perinephric fluid collection consistent with abscess/gram-negative lon and white blood cells     10/10/19 generally fatigued.  He currently denies abd pain;  Childs in and no overt hematuria or pyuria;  He denied any  fecaluria prior to admit to me.  He had complained of air in his urine to Dr. Payne.    10/11/19:  Covering for Dr. Dong.  Pt spiking fever last evening but none since.  Did not feel poorly with the fever.  Childs in place.  No n/v/d.  No rashes.  Tolerating meropenem.  C/o pain behind right knee.     No headache photophobia or neck stiffness.  No shortness of breath cough or hemoptysis.  No diarrhea.  No hematochezia melena or hematemesis.  No skin rash.    10/12/19:  No fevers overnight or today.  Feeling a bit stronger daily.  No n/v or diarrhea.  No abd pain.  No rashes.    10/13/19:  Doing well.  Wants to know when he can go home.  No fevers.  Childs remains.     ROS:      No f/c/s. No n/v/d. No rash. No new ADR to Abx.       PE:   /100   Pulse 87   Temp 98.1 °F (36.7 °C) (Oral)   Resp 18   Ht 190.5 cm (75\")   Wt 112 kg (246 lb)   SpO2 94%   BMI 30.75 kg/m²     GENERAL: awake, in no acute distress.   HEENT: Normocephalic, atraumatic.  PERRL. EOMI. No conjunctival injection. No icterus. Oropharynx clear without evidence of thrush or exudate. No evidence of peridontal disease.    HEART: RRR; No murmur, rubs, gallops.   LUNGS: Diminished at bases bilaterally without wheezing, rales, rhonchi. Normal respiratory effort. Nonlabored.  ABDOMEN: Soft, nontender, nondistended. Positive bowel sounds. No rebound or guarding.  EXT:  No cyanosis, clubbing or edema.  : +childs  with clear yellow urine.   MSK: Amputee status noted with left BKA  SKIN: Warm and dry without cutaneous eruptions on Inspection/palpation.  Scabs on right shin.  NEURO: awake and alert.    Laboratory Data    Results " from last 7 days   Lab Units 10/13/19  0400 10/12/19  0449 10/11/19  0500   WBC 10*3/mm3 12.98* 14.52* 17.49*   HEMOGLOBIN g/dL 9.2* 9.1* 9.4*   HEMATOCRIT % 28.8* 28.3* 28.5*   PLATELETS 10*3/mm3 264 234 183     Results from last 7 days   Lab Units 10/13/19  0400   SODIUM mmol/L 132*   POTASSIUM mmol/L 4.1   CHLORIDE mmol/L 103   CO2 mmol/L 21.0*   BUN mg/dL 14   CREATININE mg/dL 0.40*   GLUCOSE mg/dL 208*   CALCIUM mg/dL 7.7*     Results from last 7 days   Lab Units 10/08/19  2008   ALK PHOS U/L 207*   BILIRUBIN mg/dL 0.4   ALT (SGPT) U/L 10   AST (SGOT) U/L 9               Estimated Creatinine Clearance: 295.1 mL/min (A) (by C-G formula based on SCr of 0.4 mg/dL (L)).      Microbiology:    Microbiology Results (last 10 days)     Procedure Component Value - Date/Time    Anaerobic Culture - Body Fluid, Peritoneum [[id number]] Collected:  10/09/19 1508    Lab Status:  Preliminary result Specimen:  Body Fluid from Peritoneum Updated:  10/12/19 0928     Anaerobic Culture No anaerobes isolated at 3 days    Body Fluid Culture - Body Fluid, Peritoneum [[id number]]  (Abnormal)  (Susceptibility) Collected:  10/09/19 1508    Lab Status:  Final result Specimen:  Body Fluid from Peritoneum Updated:  10/11/19 0621     Body Fluid Culture Moderate growth (3+) Klebsiella pneumoniae ssp pneumoniae     Gram Stain Many (4+) WBCs per low power field      Moderate (3+) Gram negative bacilli    Susceptibility      Klebsiella pneumoniae ssp pneumoniae     EYAD     Ampicillin Resistant     Ampicillin + Sulbactam Susceptible     Cefazolin Susceptible     Cefepime Susceptible     Ceftazidime Susceptible     Ceftriaxone Susceptible     Gentamicin Susceptible     Levofloxacin Susceptible     Piperacillin + Tazobactam Susceptible     Trimethoprim + Sulfamethoxazole Susceptible                    Urine Culture - Urine, Urine, Clean Catch [[id number]]  (Abnormal)  (Susceptibility) Collected:  10/08/19 4368    Lab Status:  Final result Specimen:   Urine, Clean Catch Updated:  10/12/19 0931     Urine Culture >100,000 CFU/mL Klebsiella pneumoniae ssp pneumoniae      >100,000 CFU/mL Escherichia coli    Susceptibility      Klebsiella pneumoniae ssp pneumoniae     EYAD     Ampicillin Resistant     Ampicillin + Sulbactam Susceptible     Cefazolin Susceptible     Cefepime Susceptible     Ceftazidime Susceptible     Ceftriaxone Susceptible     Gentamicin Susceptible     Levofloxacin Susceptible     Nitrofurantoin Intermediate     Piperacillin + Tazobactam Susceptible     Tetracycline Susceptible     Trimethoprim + Sulfamethoxazole Susceptible                Susceptibility      Escherichia coli     EYAD     Ampicillin Resistant     Ampicillin + Sulbactam Intermediate     Cefazolin Susceptible     Cefepime Susceptible     Ceftazidime Susceptible     Ceftriaxone Susceptible     Gentamicin Susceptible     Levofloxacin Resistant     Nitrofurantoin Susceptible     Piperacillin + Tazobactam Susceptible     Tetracycline Susceptible     Trimethoprim + Sulfamethoxazole Susceptible                    Blood Culture - Blood, Arm, Right [[id number]]  (Abnormal) Collected:  10/08/19 2012    Lab Status:  Final result Specimen:  Blood from Arm, Right Updated:  10/10/19 2122     Blood Culture Klebsiella pneumoniae ssp pneumoniae     Isolated from Aerobic and Anaerobic Bottles     Gram Stain Aerobic Bottle Gram negative bacilli      Anaerobic Bottle Gram negative bacilli    Narrative:       Refer to blood culture collected 10/8/2019 at 2000 for MICs.    Blood Culture - Blood, Arm, Left [[id number]]  (Abnormal)  (Susceptibility) Collected:  10/08/19 2000    Lab Status:  Final result Specimen:  Blood from Arm, Left Updated:  10/10/19 2122     Blood Culture Klebsiella pneumoniae ssp pneumoniae     Isolated from Aerobic and Anaerobic Bottles     Gram Stain Anaerobic Bottle Gram negative bacilli      Aerobic Bottle Gram negative bacilli    Susceptibility      Klebsiella pneumoniae ssp  pneumoniae     EYAD     Ampicillin Resistant     Ampicillin + Sulbactam Susceptible     Cefazolin Susceptible     Cefepime Susceptible     Ceftazidime Susceptible     Ceftriaxone Susceptible     Gentamicin Susceptible     Levofloxacin Susceptible     Piperacillin + Tazobactam Susceptible     Trimethoprim + Sulfamethoxazole Susceptible                    Blood Culture ID, PCR - Blood, Arm, Left [[id number]]  (Abnormal) Collected:  10/08/19 2000    Lab Status:  Final result Specimen:  Blood from Arm, Left Updated:  10/09/19 1038     BCID, PCR Klebsiella pneumoniae. Identification by BCID PCR.            Radiology:  No radiology results for the last day        Impression:      --Acute Klebsiella pneumoniae septicemia/sepsis, likely urinary source/pyelnoephritis (urine cx with Klebsiella pneumoniae and E. Coli) associated with urinary retention and  with abnormal CT scan with perinephric collection/abscess and Klebsiella on aspirate;  IV abx ongoing; urology to determine any timing/option or threshold for further intervention or functional/anatomic assessment.     --Hx perirectal abscess ; Colorectal surgery, Dr. Payne previously saw and is following.  Drainage as above on August 2 with mxed Fecal sherly in culture; CT scans  with no mention of abscess on 8/18 study;  Dr Payne  following given urology conern for fistula and to help determine any timing/option or threshold for further GI/colorectal work-up     --History urinary retention.     --Hx ARF in August 2019;  ?obstruction initially associated with retention postop (1200 out with I/O cath postop per nursing) -v- contrast from CT -v- lisinopril/medication/vancomycin -v- relative hypotension -v- likely combination of factors with ATN;  vancomycin trough high and vancomycin stopped empirically 8/3 although high trough potentially accumulation as a consequence of diminishing renal function associated with retention/contrast/pressure rather than cause.  Nonetheless, avoid  for now; likely further acute kidney injury at admission August 17 associated with dehydration/prerenal/ATN etiology     --History MRSA;  Not in current culture     --Diabetes mellitus type 2, uncontrolled.  He reports the reason for this is forgetfulness     --History [place] and chronic liver disease/cirrhosis  per past notes     --Left BKA     --Peripheral vascular disease     --medical noncompliance      PLAN:     --continue ceftriaxone.  Will need prolonged course of abx, probably 2-4 weeks.  May need PICC depending on disposition.  They have done home infusion in the past.    --urology evaluation pending.  May need cystoscopy to assess for fistula.      --Discussed on Friday with Dr. Payne.  Patient poor surgical candidate and attempted to treat conservatively with abx for now.    --Monitor WBC and fever curve.  WBC improving daily and fevers resolved.    --Family asking about short term rehab; will have CM assess closer to discharge.    Complex case.    Ayad Blount MD  10/13/2019  2:00 PM

## 2019-10-14 LAB
ANION GAP SERPL CALCULATED.3IONS-SCNC: 10 MMOL/L (ref 5–15)
BACTERIA SPEC ANAEROBE CULT: NORMAL
BASOPHILS # BLD AUTO: 0.05 10*3/MM3 (ref 0–0.2)
BASOPHILS NFR BLD AUTO: 0.4 % (ref 0–1.5)
BUN BLD-MCNC: 10 MG/DL (ref 6–20)
BUN/CREAT SERPL: 22.2 (ref 7–25)
CALCIUM SPEC-SCNC: 7.7 MG/DL (ref 8.6–10.5)
CHLORIDE SERPL-SCNC: 100 MMOL/L (ref 98–107)
CO2 SERPL-SCNC: 25 MMOL/L (ref 22–29)
CREAT BLD-MCNC: 0.45 MG/DL (ref 0.76–1.27)
DEPRECATED RDW RBC AUTO: 46.3 FL (ref 37–54)
EOSINOPHIL # BLD AUTO: 0.1 10*3/MM3 (ref 0–0.4)
EOSINOPHIL NFR BLD AUTO: 0.8 % (ref 0.3–6.2)
ERYTHROCYTE [DISTWIDTH] IN BLOOD BY AUTOMATED COUNT: 14.6 % (ref 12.3–15.4)
GFR SERPL CREATININE-BSD FRML MDRD: >150 ML/MIN/1.73
GLUCOSE BLD-MCNC: 117 MG/DL (ref 65–99)
GLUCOSE BLDC GLUCOMTR-MCNC: 116 MG/DL (ref 70–130)
GLUCOSE BLDC GLUCOMTR-MCNC: 174 MG/DL (ref 70–130)
GLUCOSE BLDC GLUCOMTR-MCNC: 200 MG/DL (ref 70–130)
GLUCOSE BLDC GLUCOMTR-MCNC: 97 MG/DL (ref 70–130)
HCT VFR BLD AUTO: 29 % (ref 37.5–51)
HGB BLD-MCNC: 9.2 G/DL (ref 13–17.7)
IMM GRANULOCYTES # BLD AUTO: 0.15 10*3/MM3 (ref 0–0.05)
IMM GRANULOCYTES NFR BLD AUTO: 1.3 % (ref 0–0.5)
LYMPHOCYTES # BLD AUTO: 1.97 10*3/MM3 (ref 0.7–3.1)
LYMPHOCYTES NFR BLD AUTO: 16.5 % (ref 19.6–45.3)
MCH RBC QN AUTO: 27.4 PG (ref 26.6–33)
MCHC RBC AUTO-ENTMCNC: 31.7 G/DL (ref 31.5–35.7)
MCV RBC AUTO: 86.3 FL (ref 79–97)
MONOCYTES # BLD AUTO: 0.92 10*3/MM3 (ref 0.1–0.9)
MONOCYTES NFR BLD AUTO: 7.7 % (ref 5–12)
NEUTROPHILS # BLD AUTO: 8.78 10*3/MM3 (ref 1.7–7)
NEUTROPHILS NFR BLD AUTO: 73.3 % (ref 42.7–76)
NRBC BLD AUTO-RTO: 0.2 /100 WBC (ref 0–0.2)
PLATELET # BLD AUTO: 310 10*3/MM3 (ref 140–450)
PMV BLD AUTO: 11.6 FL (ref 6–12)
POTASSIUM BLD-SCNC: 3.7 MMOL/L (ref 3.5–5.2)
RBC # BLD AUTO: 3.36 10*6/MM3 (ref 4.14–5.8)
SODIUM BLD-SCNC: 135 MMOL/L (ref 136–145)
WBC NRBC COR # BLD: 11.97 10*3/MM3 (ref 3.4–10.8)

## 2019-10-14 PROCEDURE — 99232 SBSQ HOSP IP/OBS MODERATE 35: CPT | Performed by: NURSE PRACTITIONER

## 2019-10-14 PROCEDURE — 85025 COMPLETE CBC W/AUTO DIFF WBC: CPT | Performed by: INTERNAL MEDICINE

## 2019-10-14 PROCEDURE — 63710000001 INSULIN DETEMIR PER 5 UNITS: Performed by: PHYSICIAN ASSISTANT

## 2019-10-14 PROCEDURE — 25010000002 CEFTRIAXONE PER 250 MG: Performed by: INTERNAL MEDICINE

## 2019-10-14 PROCEDURE — 80048 BASIC METABOLIC PNL TOTAL CA: CPT | Performed by: NURSE PRACTITIONER

## 2019-10-14 PROCEDURE — 82962 GLUCOSE BLOOD TEST: CPT

## 2019-10-14 RX ADMIN — METOPROLOL TARTRATE 100 MG: 100 TABLET ORAL at 20:52

## 2019-10-14 RX ADMIN — INSULIN LISPRO 4 UNITS: 100 INJECTION, SOLUTION INTRAVENOUS; SUBCUTANEOUS at 12:45

## 2019-10-14 RX ADMIN — METOPROLOL TARTRATE 100 MG: 100 TABLET ORAL at 09:53

## 2019-10-14 RX ADMIN — SODIUM CHLORIDE, PRESERVATIVE FREE 10 ML: 5 INJECTION INTRAVENOUS at 20:54

## 2019-10-14 RX ADMIN — CEFTRIAXONE 2 G: 2 INJECTION, POWDER, FOR SOLUTION INTRAMUSCULAR; INTRAVENOUS at 17:16

## 2019-10-14 RX ADMIN — RENAGEL 800 MG: 800 TABLET ORAL at 17:16

## 2019-10-14 RX ADMIN — INSULIN LISPRO 7 UNITS: 100 INJECTION, SOLUTION INTRAVENOUS; SUBCUTANEOUS at 12:45

## 2019-10-14 RX ADMIN — AMLODIPINE BESYLATE 10 MG: 10 TABLET ORAL at 09:52

## 2019-10-14 RX ADMIN — INSULIN DETEMIR 20 UNITS: 100 INJECTION, SOLUTION SUBCUTANEOUS at 20:53

## 2019-10-14 RX ADMIN — RENAGEL 800 MG: 800 TABLET ORAL at 08:37

## 2019-10-14 RX ADMIN — DULOXETINE HYDROCHLORIDE 30 MG: 30 CAPSULE, DELAYED RELEASE ORAL at 09:53

## 2019-10-14 RX ADMIN — INSULIN LISPRO 2 UNITS: 100 INJECTION, SOLUTION INTRAVENOUS; SUBCUTANEOUS at 20:53

## 2019-10-14 RX ADMIN — TERAZOSIN HYDROCHLORIDE ANHYDROUS 5 MG: 5 CAPSULE ORAL at 20:51

## 2019-10-14 RX ADMIN — INSULIN DETEMIR 20 UNITS: 100 INJECTION, SOLUTION SUBCUTANEOUS at 09:52

## 2019-10-14 RX ADMIN — RENAGEL 800 MG: 800 TABLET ORAL at 12:45

## 2019-10-14 RX ADMIN — SODIUM CHLORIDE 100 ML/HR: 9 INJECTION, SOLUTION INTRAVENOUS at 23:47

## 2019-10-14 NOTE — PROGRESS NOTES
Continued Stay Note  [facility]     Patient Name: Kendrick Crystal  MRN: [id number]  Today's Date: 10/14/2019    Admit Date: 10/8/2019    Discharge Plan     Row Name 10/14/19 1548       Plan    Plan  Home with HH    Patient/Family in Agreement with Plan  yes    Plan Comments  Spoke with patient and wife.  They are agreeable to home health services, wife stated patient wants to go home and does not want to go to a SNF.  Wife declined a HH agency list, stating they want to use [facility].  Patient's PCP is Keyanna Meléndez Dr.  Will arrange HH services closer to [place] when final antibiotic regimen is available.      Final Discharge Disposition Code  06 - home with home health care        Discharge Codes    No documentation.       Expected Discharge Date and Time     Expected Discharge Date Expected Discharge Time    Oct 11, 2019             Barb Batista RN

## 2019-10-14 NOTE — PROGRESS NOTES
Kendrick Crystal     LOS: 6 days     Subjective   Patient feels well.  Walker catheter in place.  No further pneumaturia.  No perineal pain.  White count 11.9.  Creatinine 0.45    Objective     Vital Signs  Vitals:    10/13/19 2137   BP:    Pulse: 82   Resp:    Temp:    SpO2:        I/O  Intake & Output (last day)       10/13 0701 - 10/14 0700    P.O. 480    I.V. (mL/kg) 3270 (29.2)    Total Intake(mL/kg) 3750 (33.5)    Urine (mL/kg/hr) 4775 (1.8)    Total Output 4775    Net -1025               Physical Exam:    Abdomen soft and nontender.         Results Review:       WBC WBC   Date Value Ref Range Status   10/14/2019 11.97 (H) 3.40 - 10.80 10*3/mm3 Final   10/13/2019 12.98 (H) 3.40 - 10.80 10*3/mm3 Final   10/12/2019 14.52 (H) 3.40 - 10.80 10*3/mm3 Final      HGB Hemoglobin   Date Value Ref Range Status   10/14/2019 9.2 (L) 13.0 - 17.7 g/dL Final   10/13/2019 9.2 (L) 13.0 - 17.7 g/dL Final   10/12/2019 9.1 (L) 13.0 - 17.7 g/dL Final      HCT Hematocrit   Date Value Ref Range Status   10/14/2019 29.0 (L) 37.5 - 51.0 % Final   10/13/2019 28.8 (L) 37.5 - 51.0 % Final   10/12/2019 28.3 (L) 37.5 - 51.0 % Final      PLT        Results from last 7 days   Lab Units 10/14/19  0412   PLATELETS 10*3/mm3 310            Sodium Sodium   Date Value Ref Range Status   10/14/2019 135 (L) 136 - 145 mmol/L Final   10/13/2019 132 (L) 136 - 145 mmol/L Final   10/12/2019 130 (L) 136 - 145 mmol/L Final      Potassium Potassium   Date Value Ref Range Status   10/14/2019 3.7 3.5 - 5.2 mmol/L Final   10/13/2019 4.1 3.5 - 5.2 mmol/L Final   10/12/2019 4.0 3.5 - 5.2 mmol/L Final   10/11/2019 3.8 3.5 - 5.2 mmol/L Final      Chloride Chloride   Date Value Ref Range Status   10/14/2019 100 98 - 107 mmol/L Final   10/13/2019 103 98 - 107 mmol/L Final   10/12/2019 98 98 - 107 mmol/L Final      Bicarbonate No results found for: PLASMABICARB   BUN BUN   Date Value Ref Range Status   10/14/2019 10 6 - 20 mg/dL Final   10/13/2019 14 6 - 20 mg/dL Final    10/12/2019 15 6 - 20 mg/dL Final      Creatinine Creatinine   Date Value Ref Range Status   10/14/2019 0.45 (L) 0.76 - 1.27 mg/dL Final   10/13/2019 0.40 (L) 0.76 - 1.27 mg/dL Final   10/12/2019 0.48 (L) 0.76 - 1.27 mg/dL Final      Calcium Calcium   Date Value Ref Range Status   10/14/2019 7.7 (L) 8.6 - 10.5 mg/dL Final   10/13/2019 7.7 (L) 8.6 - 10.5 mg/dL Final   10/12/2019 7.6 (L) 8.6 - 10.5 mg/dL Final          Assessment/Plan       Perinephric abscess    DUNLAP Cirrhosis    Essential hypertension    Non-compliance    Hyperlipemia    Type 2 diabetes mellitus with circulatory disorder and uncontrolled    S/P BKA (below knee amputation), left (CMS/HCC)    Gastroparesis    Acute UTI (urinary tract infection)    Bacteremia due to Klebsiella pneumoniae    Patient showing significant improvement.  No fever.  White count normalizing.  No further flank pain or abdominal pain.  No pneumaturia.  Await urology's input regarding discontinuation of Walker catheter.  Also question regarding cystoscopy.  If patient does have a fistula to his urethra consideration could be made for transrectal closure.  Would recommend waiting for the abscess cavity to heal further before considering this.  If patient has an intestinal vesicle fistula will need to think about the risks of surgery versus conservative management.        Mumtaz Payne MD  10/14/19  6:42 AM

## 2019-10-14 NOTE — PROGRESS NOTES
[facility] Medicine Services  PROGRESS NOTE    Patient Name: Kendrick Crystal  : 1970  MRN: [id number]    Date of Admission: 10/8/2019  Primary Care Physician: No primary care provider on file.    Subjective   Subjective     CC:  Right flank pain     HPI:  Seen resting back in bed awake and alert.  Wife at bedside.  States he feels okay.  Denies current pain.  Denies nausea, vomiting.  Childs cath in place.  Tolerating diet.  No new issues reported.  States he is just waiting to find out if he is having surgery or not.    Review of Systems  CV- No chest pain, palpitations  Resp- No cough, dyspnea  GI- No N/V/D, abd pain  Abd- no n/v, no abd pain, no diarrhea     All other systems reviewed and negative.     Objective   Objective     Vital Signs:   Temp:  [98.2 °F (36.8 °C)-98.6 °F (37 °C)] 98.2 °F (36.8 °C)  Heart Rate:  [72-82] 79  Resp:  [18] 18  BP: (135-140)/(73-85) 135/73        Physical Exam:  Constitutional: No acute distress, awake and resting up in bed.  Wife at bs.    HENT: NCAT, mucous membranes moist  Respiratory: Clear to auscultation bilaterally but slightly decreased at bases, respiratory effort normal on RA.    Cardiovascular: no murmurs, rubs, or gallops, palpable pedal pulses bilaterally, no edema in RLE  Gastrointestinal: Positive bowel sounds, soft, nontender, nondistended. Obese   : childs cath in place to BSD. Urine clear nasreen  Musculoskeletal: old left BKA c/d/i.  LLE with trace edema.  And old scabs noted to right shin.  Psychiatric: flat affect, cooperative calm.  Neurologic: Oriented x 3, strength symmetric in all extremities, Cranial Nerves grossly intact to confrontation, speech clear and appropriate.  Follows commands   Skin: No rashes      Results Reviewed:    Results from last 7 days   Lab Units 10/14/19  0412 10/13/19  0400 10/12/19  0449  10/09/19  0940 10/09/19  0331   WBC 10*3/mm3 11.97* 12.98* 14.52*   < >  --  29.99*   HEMOGLOBIN g/dL 9.2* 9.2*  9.1*   < >  --  10.2*   HEMATOCRIT % 29.0* 28.8* 28.3*   < >  --  31.2*   PLATELETS 10*3/mm3 310 264 234   < >  --  235   INR   --   --   --   --  1.17*  --    PROCALCITONIN ng/mL  --   --   --   --   --  1.60*    < > = values in this interval not displayed.     Results from last 7 days   Lab Units 10/14/19  0412 10/13/19  0400 10/12/19  0449  10/09/19  0331 10/08/19  2008   SODIUM mmol/L 135* 132* 130*   < > 125* 124*   POTASSIUM mmol/L 3.7 4.1 4.0   < > 3.6 4.3   CHLORIDE mmol/L 100 103 98   < > 88* 84*   CO2 mmol/L 25.0 21.0* 24.0   < > 22.0 23.0   BUN mg/dL 10 14 15   < > 29* 33*   CREATININE mg/dL 0.45* 0.40* 0.48*   < > 0.75* 0.85   GLUCOSE mg/dL 117* 208* 206*   < > 341* 449*   CALCIUM mg/dL 7.7* 7.7* 7.6*   < > 8.4* 9.2   ALT (SGPT) U/L  --   --   --   --   --  10   AST (SGOT) U/L  --   --   --   --   --  9   TROPONIN T ng/mL  --   --   --   --  0.031* 0.042*    < > = values in this interval not displayed.     Estimated Creatinine Clearance: 262.3 mL/min (A) (by C-G formula based on SCr of 0.45 mg/dL (L)).    Microbiology Results Abnormal     Procedure Component Value - Date/Time    Anaerobic Culture - Body Fluid, Peritoneum [[id number]] Collected:  10/09/19 1507    Lab Status:  Final result Specimen:  Body Fluid from Peritoneum Updated:  10/14/19 0935     Anaerobic Culture No anaerobes isolated at 5 days    Urine Culture - Urine, Urine, Clean Catch [[id number]]  (Abnormal)  (Susceptibility) Collected:  10/08/19 6295    Lab Status:  Final result Specimen:  Urine, Clean Catch Updated:  10/12/19 0931     Urine Culture >100,000 CFU/mL Klebsiella pneumoniae ssp pneumoniae      >100,000 CFU/mL Escherichia coli    Susceptibility      Klebsiella pneumoniae ssp pneumoniae     EYAD     Ampicillin Resistant     Ampicillin + Sulbactam Susceptible     Cefazolin Susceptible     Cefepime Susceptible     Ceftazidime Susceptible     Ceftriaxone Susceptible     Gentamicin Susceptible     Levofloxacin Susceptible      Nitrofurantoin Intermediate     Piperacillin + Tazobactam Susceptible     Tetracycline Susceptible     Trimethoprim + Sulfamethoxazole Susceptible                Susceptibility      Escherichia coli     EYAD     Ampicillin Resistant     Ampicillin + Sulbactam Intermediate     Cefazolin Susceptible     Cefepime Susceptible     Ceftazidime Susceptible     Ceftriaxone Susceptible     Gentamicin Susceptible     Levofloxacin Resistant     Nitrofurantoin Susceptible     Piperacillin + Tazobactam Susceptible     Tetracycline Susceptible     Trimethoprim + Sulfamethoxazole Susceptible                    Body Fluid Culture - Body Fluid, Peritoneum [[id number]]  (Abnormal)  (Susceptibility) Collected:  10/09/19 1508    Lab Status:  Final result Specimen:  Body Fluid from Peritoneum Updated:  10/11/19 0621     Body Fluid Culture Moderate growth (3+) Klebsiella pneumoniae ssp pneumoniae     Gram Stain Many (4+) WBCs per low power field      Moderate (3+) Gram negative bacilli    Susceptibility      Klebsiella pneumoniae ssp pneumoniae     EYAD     Ampicillin Resistant     Ampicillin + Sulbactam Susceptible     Cefazolin Susceptible     Cefepime Susceptible     Ceftazidime Susceptible     Ceftriaxone Susceptible     Gentamicin Susceptible     Levofloxacin Susceptible     Piperacillin + Tazobactam Susceptible     Trimethoprim + Sulfamethoxazole Susceptible                    Blood Culture - Blood, Arm, Right [[id number]]  (Abnormal) Collected:  10/08/19 2012    Lab Status:  Final result Specimen:  Blood from Arm, Right Updated:  10/10/19 2122     Blood Culture Klebsiella pneumoniae ssp pneumoniae     Isolated from Aerobic and Anaerobic Bottles     Gram Stain Aerobic Bottle Gram negative bacilli      Anaerobic Bottle Gram negative bacilli    Narrative:       Refer to blood culture collected 10/8/2019 at 2000 for MICs.    Blood Culture - Blood, Arm, Left [[id number]]  (Abnormal)  (Susceptibility) Collected:  10/08/19 2000    Lab Status:   Final result Specimen:  Blood from Arm, Left Updated:  10/10/19 2122     Blood Culture Klebsiella pneumoniae ssp pneumoniae     Isolated from Aerobic and Anaerobic Bottles     Gram Stain Anaerobic Bottle Gram negative bacilli      Aerobic Bottle Gram negative bacilli    Susceptibility      Klebsiella pneumoniae ssp pneumoniae     EYAD     Ampicillin Resistant     Ampicillin + Sulbactam Susceptible     Cefazolin Susceptible     Cefepime Susceptible     Ceftazidime Susceptible     Ceftriaxone Susceptible     Gentamicin Susceptible     Levofloxacin Susceptible     Piperacillin + Tazobactam Susceptible     Trimethoprim + Sulfamethoxazole Susceptible                    Blood Culture ID, PCR - Blood, Arm, Left [[id number]]  (Abnormal) Collected:  10/08/19 2000    Lab Status:  Final result Specimen:  Blood from Arm, Left Updated:  10/09/19 1038     BCID, PCR Klebsiella pneumoniae. Identification by BCID PCR.          Imaging Results (last 24 hours)     ** No results found for the last 24 hours. **          Results for orders placed during the hospital encounter of 05/18/19   Adult Transthoracic Echo Complete W/ Cont if Necessary Per Protocol    Narrative · Estimated EF = 60%.  · Mild mitral valve regurgitation is present  · Mild tricuspid valve regurgitation is present.  · Calculated right ventricular systolic pressure from tricuspid   regurgitation is 29 mmHg.          I have reviewed the medications:  Scheduled Meds:    amLODIPine 10 mg Oral Q24H   ceftriaxone 2 g Intravenous Q24H   DULoxetine 30 mg Oral Daily   insulin detemir 20 Units Subcutaneous Q12H   insulin lispro 0-9 Units Subcutaneous 4x Daily With Meals & Nightly   insulin lispro 7 Units Subcutaneous TID With Meals   metoprolol tartrate 100 mg Oral Q12H   sevelamer 800 mg Oral TID With Meals   sodium chloride 10 mL Intravenous Q12H   terazosin 5 mg Oral Nightly     Continuous Infusions:    sodium chloride 100 mL/hr Last Rate: 100 mL/hr (10/14/19 7765)     PRN  Meds:.•  acetaminophen **OR** acetaminophen **OR** acetaminophen  •  dextrose  •  dextrose  •  glucagon (human recombinant)  •  HYDROcodone-acetaminophen  •  LORazepam  •  melatonin  •  ondansetron  •  potassium chloride **OR** potassium chloride **OR** potassium chloride  •  sodium chloride      Assessment/Plan   Assessment / Plan     Active Hospital Problems    Diagnosis  POA   • **Perinephric abscess [N15.1]  Yes   • Bacteremia due to Klebsiella pneumoniae [R78.81]  Unknown   • Acute UTI (urinary tract infection) [N39.0]  Yes   • Gastroparesis [K31.84]  Yes   • S/P BKA (below knee amputation), left (CMS/HCC) [Z89.512]  Not Applicable   • Type 2 diabetes mellitus with circulatory disorder and uncontrolled [E11.59]  Yes   • DUNLAP Cirrhosis [K74.60]  Yes   • Essential hypertension [I10]  Yes   • Hyperlipemia [E78.5]  Yes   • Non-compliance [Z91.14]  Not Applicable      Resolved Hospital Problems   No resolved problems to display.        Brief Hospital Course to date:  Kendrick Crystal is a 49 y.o. male  48 yo gentleman new patient for me today, hx of T2DM, hypertension, hx of multiple skin/abscesses/MRSA infection, DUNLAP cirrhosis, PVD s/p L BKA, and  presented with sepsis 2/2 renal and perinephric abscess s/p IR guided drainage of abscess culture growing Klebsiella pneumoniae currently on meropenem since 10/9.    Assessment/Plan:    Sepsis 2/2 perinephric abscess and UTI   Klebsiella pneuomniae bacteremia  -ID following, on meropenem   -cultures from peritoneum and blood cultures from 10/10 growing Klebsiella resistant to ampicillin, if urine culture also growing klebsiella pneumoniae can de-escalate to IV ceftriaxone   -- abx de-escalated to rocephin stable.  --urology consulted placed childs at bedside.  Possible need for cystoscopy, awaiting urology final recommendations and treatment plan later today.  --CRS consulted wants conservative management for now however considering possible need for cystoscopy per  urology.  --am labs     Hyponatremia   -- improved    -- suspect hypovolemic hyponatremia related to po intake cont IVF's for now   -- consider NAL if worsens     DUNLAP Cirrhosis  -- stable avoid hepatotoxins      Right lower extremity pain above knee:   no edema or redness on  Exam, ckd duplex RLE to rule out DVT, was negative     BPH: continue terazosin qnightly  Intermittent hematuria  --Urology/CRS following  --childs placed by urology   Plan for possible cystoscopy.  Awaiting final urology decision today.    HTN: continue lopressor 100 mg q12h and amlodipine 10 mg q 24h    T2DM poorly controlled  (A1c 14.10)  Glucoses running 116-164.  Generally stable and improved on current regimen.  continue levemir 20u sc q12h and correction dose insulin.   continue current mealtime insulin.      DVT Prophylaxis:  mechanical    Disposition: I expect the patient to be discharged when appropriate antibiotic selection can be made, plan for SNF upon discharge. CM on board     CODE STATUS:   Code Status and Medical Interventions:   Ordered at: 10/08/19 4655     Level Of Support Discussed With:    Patient     Code Status:    CPR     Medical Interventions (Level of Support Prior to Arrest):    Full         Electronically signed by CHINTAN Gallagher, 10/14/19, 12:24 PM.

## 2019-10-14 NOTE — THERAPY DISCHARGE NOTE
Acute Care - Occupational Therapy Discharge Summary  [facility]     Patient Name: Kendrick Crystal  : 1970  MRN: [id number]    Today's Date: 10/14/2019  Onset of Illness/Injury or Date of Surgery: 10/08/19    Date of Referral to OT: 10/09/19  Referring Physician: CHINTAN Lenz      Admit Date: 10/8/2019        OT Recommendation and Plan    Visit Dx:    ICD-10-CM ICD-9-CM   1. Perinephric abscess N15.1 590.2   2. Sepsis, due to unspecified organism, unspecified whether acute organ dysfunction present (CMS/[facility]) A41.9 038.9     995.91   3. Dehydration E86.0 276.51   4. Uncontrolled type 2 diabetes mellitus with hyperglycemia (CMS/[facility]) E11.65 250.02   5. Essential hypertension I10 401.9   6. History of MRSA infection Z86.14 V12.04   7. Cirrhosis of liver without ascites, unspecified hepatic cirrhosis type (CMS/[facility]) K74.60 571.5   8. Impaired mobility and ADLs Z74.09 799.89         Time Calculation- OT     Row Name 10/14/19 1422             Time Calculation- OT    OT Start Time  2  -      OT Received On  10/14/19  -        User Key  (r) = Recorded By, (t) = Taken By, (c) = Cosigned By    Initials Name Provider Type    Clarita Patel OT Occupational Therapist                        OT Discharge Summary  Anticipated Discharge Disposition (OT): home with assist  Reason for Discharge: other (comment)(Pt requested to be discharged from OT)  Discharge Destination: Home with assist      Clarita Enamorado OT  10/14/2019

## 2019-10-14 NOTE — PROGRESS NOTES
[facility] Progress Note        Antibiotics:  Ceftriaxone 2 g iv daily      CC: fatigue    HPI:  Patient is a 49 y.o.  Yr old male with history of poorly contolled T2DM, DUNLAP/liver cirrhosis, HTN, PVD/Left BKA, and multiple skin abscesses/MRSA who presented to [facility] ED with right shin wound infection summer 2018.  He was unable to get to see Dr. Martinez as his father passed away unexpectedly on 18 and he had to wait until after the .  The wound worsened becoming gangrenous and he came to [facility] ED in 2018.  He also had been hospitalized at [facility] and then transferred to  for a severe penis/scrotum infection requiring surgical debridement in summer 2018.  He received antibiotics regarding his right leg infection, generally improved over the remainder of summer 2018 but that area had not completely healed. He was admitted 2019 with acute left lower abdominal wall redness/swelling and pain, spontaneous drainage associated with fever/chills and uncontrolled blood sugars.   I&D requiring wide debridement , severe/deep infection and transferred to [facility] on May 3 with IV Zosyn/oral doxycycline. Cultures had lactobacillus and mixed gram-positive skin sherly including alpha strep, staph epidermidis and corynebacterium. Readmitted to [facility] May 18, 2019, increasing generalized edema ultimately transitioned to oral doxycycline and healed.     He presented to the emergency room 2019 with acute rectal pain that had begun ; this is associated with constipation for days, chills and nausea/dry heaving.  White blood cell count elevated, high hemoglobin A1c over 13, urinalysis with hematuria/pyuria and CT scan with 3 x 3 cm fluid collection anterior to the distal rectum and per discussion with Dr. Akbar/Jennifer, this is not in the prostate.  Colorectal surgery/urology saw him for consideration of surgical options/timing/threshold, etc..     19 I&Dper  "Dr Payne perirectal abscess; patient reports that he had instrumented his rectum prior to hospitalization with enema     8/3/19 urinary retention overnight requiring in/out catheterization per patient.  Creat climb     8/4/19 further creat climb; childs placed and lisinopril stopped and d/w Dr Rubio;  ?obstruction initially associated with retention postop (1200 out with I/O cath postop per nursing) -v- contrast from CT -v- lisinopril/medication/vancomycin -v- relative hypotension -v- likely combination of factors with ATN; nephrology following; vancomycin trough high and vancomycin stopped empirically 8/3 although high trough potentially accumulation as a consequence of diminishing renal function associated with retention/contrast/pressure rather than cause.  Nonetheless, avoid for now; creatinine trending down.        8/7 in retrospect he remembers air in his urine at admit which has raised concern for possible fistula from urinary tract;  No fecaluria and culture from abscess is fecal sherly;  ?rectal-urethral fistula;  Dr Payne aware     Culture with E. coli/Klebsiella species and creatinine generally trended down, transitioned to oral antibiotics at discharge.     Readmitted August 17, 2019 with nausea/vomiting/dry heaves for 3 days.  Childs catheter was placed and CT scan of the abdomen showed:      \"Previously seen fluid collection identified inferior to the prostate gland is more increased in density on today's examination. There is wall thickening again seen throughout the bladder. Findings again are concerning for cystitis.\" per radiology     He was transitioned to oral omnicef/topical antifungal on approx 8/23/19; Noncompliant with f/u in our office     READMITTED 10/8/19 RLQ abd pain, urinary retention, nausea, dysuria and generalized fatigue     UTI by U/A, subsequent blood cultures positive for GNR and initial PCR with kleb sp, no carbapenem resistance by initial PCR per my d/w micro;  Abnormal CT abd " "with right perinephric fluid collection, advanced cirrhosis, urinary bladder thickening.  10/9 Aspirate of perinephric fluid collection consistent with abscess/gram-negative lon and white blood cells     10/10/19 generally fatigued.  He currently denies abd pain;  Childs in and no overt hematuria or pyuria;  He denied any  fecaluria prior to admit to me.  He had complained of air in his urine to Dr. Payne.    10/11/19:  Covering for Dr. Dong.  Pt spiking fever last evening but none since.  Did not feel poorly with the fever.  Childs in place.  No n/v/d.  No rashes.  Tolerating meropenem.  C/o pain behind right knee.     No headache photophobia or neck stiffness.  No shortness of breath cough or hemoptysis.  No diarrhea.  No hematochezia melena or hematemesis.  No skin rash.    10/12/19:  No fevers overnight or today.  Feeling a bit stronger daily.  No n/v or diarrhea.  No abd pain.  No rashes.    10/13/19:  Doing well.  Wants to know when he can go home.  No fevers.  Childs remains.    10/14/19:  Up out of bed today.  No fevers.  Looking stronger daily.  No fevers.  WBC coming down daily.  Tolerating abx.  No diarrhea and no rashes.  Childs remains.     ROS:      No f/c/s. No n/v/d. No rash. No new ADR to Abx.       PE:   /73   Pulse 79   Temp 98.2 °F (36.8 °C) (Oral)   Resp 18   Ht 190.5 cm (75\")   Wt 112 kg (246 lb)   SpO2 94%   BMI 30.75 kg/m²     GENERAL: awake, in no acute distress.   HEENT: Normocephalic, atraumatic.  PERRL. EOMI. No conjunctival injection. No icterus.   HEART: RRR; No murmur, rubs, gallops.   LUNGS: Diminished at bases bilaterally without wheezing, rales, rhonchi. Normal respiratory effort. Nonlabored.  ABDOMEN: Soft, nontender, nondistended. Positive bowel sounds. No rebound or guarding.  EXT:  No cyanosis, clubbing or edema.  : +childs  with clear yellow urine.  No CVA TTP.  MSK: Amputee status noted with left BKA  SKIN: Warm and dry without cutaneous eruptions on " Inspection/palpation.  Scabs on right shin.  NEURO: awake and alert.    Laboratory Data    Results from last 7 days   Lab Units 10/14/19  0412 10/13/19  0400 10/12/19  0449   WBC 10*3/mm3 11.97* 12.98* 14.52*   HEMOGLOBIN g/dL 9.2* 9.2* 9.1*   HEMATOCRIT % 29.0* 28.8* 28.3*   PLATELETS 10*3/mm3 310 264 234     Results from last 7 days   Lab Units 10/14/19  0412   SODIUM mmol/L 135*   POTASSIUM mmol/L 3.7   CHLORIDE mmol/L 100   CO2 mmol/L 25.0   BUN mg/dL 10   CREATININE mg/dL 0.45*   GLUCOSE mg/dL 117*   CALCIUM mg/dL 7.7*     Results from last 7 days   Lab Units 10/08/19  2008   ALK PHOS U/L 207*   BILIRUBIN mg/dL 0.4   ALT (SGPT) U/L 10   AST (SGOT) U/L 9               Estimated Creatinine Clearance: 262.3 mL/min (A) (by C-G formula based on SCr of 0.45 mg/dL (L)).      Microbiology:    Microbiology Results (last 10 days)     Procedure Component Value - Date/Time    Anaerobic Culture - Body Fluid, Peritoneum [[id number]] Collected:  10/09/19 1508    Lab Status:  Final result Specimen:  Body Fluid from Peritoneum Updated:  10/14/19 0935     Anaerobic Culture No anaerobes isolated at 5 days    Body Fluid Culture - Body Fluid, Peritoneum [[id number]]  (Abnormal)  (Susceptibility) Collected:  10/09/19 1508    Lab Status:  Final result Specimen:  Body Fluid from Peritoneum Updated:  10/11/19 0621     Body Fluid Culture Moderate growth (3+) Klebsiella pneumoniae ssp pneumoniae     Gram Stain Many (4+) WBCs per low power field      Moderate (3+) Gram negative bacilli    Susceptibility      Klebsiella pneumoniae ssp pneumoniae     EYAD     Ampicillin Resistant     Ampicillin + Sulbactam Susceptible     Cefazolin Susceptible     Cefepime Susceptible     Ceftazidime Susceptible     Ceftriaxone Susceptible     Gentamicin Susceptible     Levofloxacin Susceptible     Piperacillin + Tazobactam Susceptible     Trimethoprim + Sulfamethoxazole Susceptible                    Urine Culture - Urine, Urine, Clean Catch [[id number]]   (Abnormal)  (Susceptibility) Collected:  10/08/19 2256    Lab Status:  Final result Specimen:  Urine, Clean Catch Updated:  10/12/19 0931     Urine Culture >100,000 CFU/mL Klebsiella pneumoniae ssp pneumoniae      >100,000 CFU/mL Escherichia coli    Susceptibility      Klebsiella pneumoniae ssp pneumoniae     EYAD     Ampicillin Resistant     Ampicillin + Sulbactam Susceptible     Cefazolin Susceptible     Cefepime Susceptible     Ceftazidime Susceptible     Ceftriaxone Susceptible     Gentamicin Susceptible     Levofloxacin Susceptible     Nitrofurantoin Intermediate     Piperacillin + Tazobactam Susceptible     Tetracycline Susceptible     Trimethoprim + Sulfamethoxazole Susceptible                Susceptibility      Escherichia coli     EYAD     Ampicillin Resistant     Ampicillin + Sulbactam Intermediate     Cefazolin Susceptible     Cefepime Susceptible     Ceftazidime Susceptible     Ceftriaxone Susceptible     Gentamicin Susceptible     Levofloxacin Resistant     Nitrofurantoin Susceptible     Piperacillin + Tazobactam Susceptible     Tetracycline Susceptible     Trimethoprim + Sulfamethoxazole Susceptible                    Blood Culture - Blood, Arm, Right [[id number]]  (Abnormal) Collected:  10/08/19 2012    Lab Status:  Final result Specimen:  Blood from Arm, Right Updated:  10/10/19 2122     Blood Culture Klebsiella pneumoniae ssp pneumoniae     Isolated from Aerobic and Anaerobic Bottles     Gram Stain Aerobic Bottle Gram negative bacilli      Anaerobic Bottle Gram negative bacilli    Narrative:       Refer to blood culture collected 10/8/2019 at 2000 for MICs.    Blood Culture - Blood, Arm, Left [[id number]]  (Abnormal)  (Susceptibility) Collected:  10/08/19 2000    Lab Status:  Final result Specimen:  Blood from Arm, Left Updated:  10/10/19 2122     Blood Culture Klebsiella pneumoniae ssp pneumoniae     Isolated from Aerobic and Anaerobic Bottles     Gram Stain Anaerobic Bottle Gram negative bacilli       Aerobic Bottle Gram negative bacilli    Susceptibility      Klebsiella pneumoniae ssp pneumoniae     EYAD     Ampicillin Resistant     Ampicillin + Sulbactam Susceptible     Cefazolin Susceptible     Cefepime Susceptible     Ceftazidime Susceptible     Ceftriaxone Susceptible     Gentamicin Susceptible     Levofloxacin Susceptible     Piperacillin + Tazobactam Susceptible     Trimethoprim + Sulfamethoxazole Susceptible                    Blood Culture ID, PCR - Blood, Arm, Left [[id number]]  (Abnormal) Collected:  10/08/19 2000    Lab Status:  Final result Specimen:  Blood from Arm, Left Updated:  10/09/19 1038     BCID, PCR Klebsiella pneumoniae. Identification by BCID PCR.            Radiology:  No radiology results for the last day        Impression:      --Acute Klebsiella pneumoniae septicemia/sepsis, likely urinary source/pyelnoephritis (urine cx with Klebsiella pneumoniae and E. Coli) associated with urinary retention and  with abnormal CT scan with perinephric collection/abscess and Klebsiella on aspirate;  IV abx ongoing; urology to determine any timing/option or threshold for further intervention or functional/anatomic assessment.     --Hx perirectal abscess ; Colorectal surgery, Dr. Payne previously saw and is following.  Drainage as above on August 2 with mxed Fecal sherly in culture; CT scans  with no mention of abscess on 8/18 study;  Dr Payne  following given urology conern for fistula and to help determine any timing/option or threshold for further GI/colorectal work-up     --History urinary retention.     --Hx ARF in August 2019;  ?obstruction initially associated with retention postop (1200 out with I/O cath postop per nursing) -v- contrast from CT -v- lisinopril/medication/vancomycin -v- relative hypotension -v- likely combination of factors with ATN;  vancomycin trough high and vancomycin stopped empirically 8/3 although high trough potentially accumulation as a consequence of diminishing renal  function associated with retention/contrast/pressure rather than cause.  Nonetheless, avoid for now; likely further acute kidney injury at admission August 17 associated with dehydration/prerenal/ATN etiology     --History MRSA;  Not in current culture     --Diabetes mellitus type 2, uncontrolled.  He reports the reason for this is forgetfulness     --History Johnston and chronic liver disease/cirrhosis  per past notes     --Left BKA     --Peripheral vascular disease     --medical noncompliance      PLAN:     --continue ceftriaxone.  Will need prolonged course of abx, probably 2-4 weeks.  Probably will need PICC depending on disposition.  They have done home infusion in the past and he prefers to go home rather than rehab.    --urology evaluation pending.  May need cystoscopy to assess for fistula.  Also need advice on Walker.    --Dr. Payne continues to follow.    --Monitoring WBC and fever curve.  WBC improving daily and fevers resolved.    Dr. Dong will resume the patient's care tomorrow and make final abx plans.    Complex case.    Ayad Blount MD  10/14/2019  11:52 AM

## 2019-10-14 NOTE — PLAN OF CARE
Problem: Patient Care Overview  Goal: Plan of Care Review  Outcome: Ongoing (interventions implemented as appropriate)   10/14/19 7412   Plan of Care Review   Progress no change   OTHER   Outcome Summary Pt requested to be discharged from OT services. OT honored pt's request. No goals met.    Coping/Psychosocial   Plan of Care Reviewed With patient

## 2019-10-14 NOTE — PROGRESS NOTES
"Daily Progress Note    Patient: Kendrick [facility] Day: 6    Subjective: Pt improving clinically. Concern for -GI fistula.       Objective:     /73   Pulse 79   Temp 98.2 °F (36.8 °C) (Oral)   Resp 18   Ht 190.5 cm (75\")   Wt 112 kg (246 lb)   SpO2 94%   BMI 30.75 kg/m²       Intake/Output Summary (Last 24 hours) at 10/14/2019 1152  Last data filed at 10/14/2019 0838  Gross per 24 hour   Intake 4230 ml   Output 3575 ml   Net 655 ml       Review of Systems:    Lab Results (last 24 hours)     Procedure Component Value Units Date/Time    POC Glucose Once [[id number]]  (Abnormal) Collected:  10/14/19 1137    Specimen:  Blood Updated:  10/14/19 1139     Glucose 200 mg/dL     Anaerobic Culture - Body Fluid, Peritoneum [[id number]] Collected:  10/09/19 1508    Specimen:  Body Fluid from Peritoneum Updated:  10/14/19 0935     Anaerobic Culture No anaerobes isolated at 5 days    POC Glucose Once [[id number]]  (Normal) Collected:  10/14/19 0739    Specimen:  Blood Updated:  10/14/19 0742     Glucose 116 mg/dL     Basic Metabolic Panel [[id number]]  (Abnormal) Collected:  10/14/19 0412    Specimen:  Blood Updated:  10/14/19 0548     Glucose 117 mg/dL      BUN 10 mg/dL      Creatinine 0.45 mg/dL      Sodium 135 mmol/L      Potassium 3.7 mmol/L      Chloride 100 mmol/L      CO2 25.0 mmol/L      Calcium 7.7 mg/dL      eGFR Non African Amer >150 mL/min/1.73      BUN/Creatinine Ratio 22.2     Anion Gap 10.0 mmol/L     Narrative:       GFR Normal >60  Chronic Kidney Disease <60  Kidney Failure <15    CBC & Differential [[id number]] Collected:  10/14/19 0412    Specimen:  Blood Updated:  10/14/19 0533    Narrative:       The following orders were created for panel order CBC & Differential.  Procedure                               Abnormality         Status                     ---------                               -----------         ------                     CBC Auto Differential[[id number]]        Abnormal          "   Final result                 Please view results for these tests on the individual orders.    CBC Auto Differential [[id number]]  (Abnormal) Collected:  10/14/19 0412    Specimen:  Blood Updated:  10/14/19 0533     WBC 11.97 10*3/mm3      RBC 3.36 10*6/mm3      Hemoglobin 9.2 g/dL      Hematocrit 29.0 %      MCV 86.3 fL      MCH 27.4 pg      MCHC 31.7 g/dL      RDW 14.6 %      RDW-SD 46.3 fl      MPV 11.6 fL      Platelets 310 10*3/mm3      Neutrophil % 73.3 %      Lymphocyte % 16.5 %      Monocyte % 7.7 %      Eosinophil % 0.8 %      Basophil % 0.4 %      Immature Grans % 1.3 %      Neutrophils, Absolute 8.78 10*3/mm3      Lymphocytes, Absolute 1.97 10*3/mm3      Monocytes, Absolute 0.92 10*3/mm3      Eosinophils, Absolute 0.10 10*3/mm3      Basophils, Absolute 0.05 10*3/mm3      Immature Grans, Absolute 0.15 10*3/mm3      nRBC 0.2 /100 WBC     POC Glucose Once [[id number]]  (Abnormal) Collected:  10/13/19 2015    Specimen:  Blood Updated:  10/13/19 2017     Glucose 164 mg/dL     POC Glucose Once [[id number]]  (Normal) Collected:  10/13/19 1641    Specimen:  Blood Updated:  10/13/19 1646     Glucose 123 mg/dL           Imaging Results (last 24 hours)     ** No results found for the last 24 hours. **          Assessment/Plan: Have discussed pt with Dr. Payne. Will plan to perform cystoscopy asap.          Patient Active Problem List   Diagnosis   • DUNLAP Cirrhosis   • Essential hypertension   • Non-compliance   • Hyperlipemia   • Hypertriglyceridemia, essential   • Sepsis affecting skin   • Type 2 diabetes mellitus with circulatory disorder and uncontrolled   • History of MRSA infection   • S/P BKA (below knee amputation), left (CMS/HCC)   • Peripheral vascular disease (CMS/HCC)   • DM (diabetes mellitus) type II uncontrolled, periph vascular disorder (CMS/HCC)   • Obesity (BMI 30-39.9)   • Gastroparesis   • Perinephric abscess   • Acute UTI (urinary tract infection)   • Bacteremia due to Klebsiella pneumoniae         Diagnosis Plan   1. Perinephric abscess     2. Sepsis, due to unspecified organism, unspecified whether acute organ dysfunction present (CMS/HCC)     3. Dehydration     4. Uncontrolled type 2 diabetes mellitus with hyperglycemia (CMS/HCC)     5. Essential hypertension     6. History of MRSA infection     7. Cirrhosis of liver without ascites, unspecified hepatic cirrhosis type (CMS/HCC)     8. Impaired mobility and ADLs           Brad Gutierres MD - 10/14/2019, 11:52 AM

## 2019-10-14 NOTE — PLAN OF CARE
Problem: Patient Care Overview  Goal: Plan of Care Review  Outcome: Ongoing (interventions implemented as appropriate)    Goal: Discharge Needs Assessment  Outcome: Ongoing (interventions implemented as appropriate)    Goal: Interprofessional Rounds/Family Conf  Outcome: Ongoing (interventions implemented as appropriate)      Problem: Fall Risk (Adult)  Goal: Absence of Fall  Outcome: Ongoing (interventions implemented as appropriate)      Problem: Skin Injury Risk (Adult)  Goal: Skin Health and Integrity  Outcome: Ongoing (interventions implemented as appropriate)      Problem: Pain, Acute (Adult)  Goal: Acceptable Pain Control/Comfort Level  Outcome: Ongoing (interventions implemented as appropriate)      Problem: Infection, Risk/Actual (Adult)  Goal: Identify Related Risk Factors and Signs and Symptoms  Outcome: Ongoing (interventions implemented as appropriate)    Goal: Infection Prevention/Resolution  Outcome: Ongoing (interventions implemented as appropriate)

## 2019-10-15 LAB
ANION GAP SERPL CALCULATED.3IONS-SCNC: 10 MMOL/L (ref 5–15)
BASOPHILS # BLD AUTO: 0.05 10*3/MM3 (ref 0–0.2)
BASOPHILS NFR BLD AUTO: 0.3 % (ref 0–1.5)
BUN BLD-MCNC: 12 MG/DL (ref 6–20)
BUN/CREAT SERPL: 22.6 (ref 7–25)
CALCIUM SPEC-SCNC: 7.9 MG/DL (ref 8.6–10.5)
CHLORIDE SERPL-SCNC: 100 MMOL/L (ref 98–107)
CO2 SERPL-SCNC: 26 MMOL/L (ref 22–29)
CREAT BLD-MCNC: 0.53 MG/DL (ref 0.76–1.27)
DEPRECATED RDW RBC AUTO: 44.9 FL (ref 37–54)
EOSINOPHIL # BLD AUTO: 0.12 10*3/MM3 (ref 0–0.4)
EOSINOPHIL NFR BLD AUTO: 0.8 % (ref 0.3–6.2)
ERYTHROCYTE [DISTWIDTH] IN BLOOD BY AUTOMATED COUNT: 14.4 % (ref 12.3–15.4)
GFR SERPL CREATININE-BSD FRML MDRD: >150 ML/MIN/1.73
GLUCOSE BLD-MCNC: 91 MG/DL (ref 65–99)
GLUCOSE BLDC GLUCOMTR-MCNC: 103 MG/DL (ref 70–130)
GLUCOSE BLDC GLUCOMTR-MCNC: 128 MG/DL (ref 70–130)
GLUCOSE BLDC GLUCOMTR-MCNC: 142 MG/DL (ref 70–130)
GLUCOSE BLDC GLUCOMTR-MCNC: 87 MG/DL (ref 70–130)
HCT VFR BLD AUTO: 29.7 % (ref 37.5–51)
HGB BLD-MCNC: 9.3 G/DL (ref 13–17.7)
IMM GRANULOCYTES # BLD AUTO: 0.16 10*3/MM3 (ref 0–0.05)
IMM GRANULOCYTES NFR BLD AUTO: 1.1 % (ref 0–0.5)
LYMPHOCYTES # BLD AUTO: 1.52 10*3/MM3 (ref 0.7–3.1)
LYMPHOCYTES NFR BLD AUTO: 10.1 % (ref 19.6–45.3)
MCH RBC QN AUTO: 26.9 PG (ref 26.6–33)
MCHC RBC AUTO-ENTMCNC: 31.3 G/DL (ref 31.5–35.7)
MCV RBC AUTO: 85.8 FL (ref 79–97)
MONOCYTES # BLD AUTO: 0.99 10*3/MM3 (ref 0.1–0.9)
MONOCYTES NFR BLD AUTO: 6.6 % (ref 5–12)
NEUTROPHILS # BLD AUTO: 12.17 10*3/MM3 (ref 1.7–7)
NEUTROPHILS NFR BLD AUTO: 81.1 % (ref 42.7–76)
NRBC BLD AUTO-RTO: 0 /100 WBC (ref 0–0.2)
PLATELET # BLD AUTO: 345 10*3/MM3 (ref 140–450)
PMV BLD AUTO: 11.4 FL (ref 6–12)
POTASSIUM BLD-SCNC: 3.7 MMOL/L (ref 3.5–5.2)
RBC # BLD AUTO: 3.46 10*6/MM3 (ref 4.14–5.8)
SODIUM BLD-SCNC: 136 MMOL/L (ref 136–145)
WBC NRBC COR # BLD: 15.01 10*3/MM3 (ref 3.4–10.8)

## 2019-10-15 PROCEDURE — 99232 SBSQ HOSP IP/OBS MODERATE 35: CPT | Performed by: NURSE PRACTITIONER

## 2019-10-15 PROCEDURE — 85025 COMPLETE CBC W/AUTO DIFF WBC: CPT | Performed by: NURSE PRACTITIONER

## 2019-10-15 PROCEDURE — 05HY33Z INSERTION OF INFUSION DEVICE INTO UPPER VEIN, PERCUTANEOUS APPROACH: ICD-10-PCS | Performed by: INTERNAL MEDICINE

## 2019-10-15 PROCEDURE — 82962 GLUCOSE BLOOD TEST: CPT

## 2019-10-15 PROCEDURE — C1751 CATH, INF, PER/CENT/MIDLINE: HCPCS

## 2019-10-15 PROCEDURE — 25010000002 CEFTRIAXONE PER 250 MG: Performed by: INTERNAL MEDICINE

## 2019-10-15 PROCEDURE — 80048 BASIC METABOLIC PNL TOTAL CA: CPT | Performed by: NURSE PRACTITIONER

## 2019-10-15 PROCEDURE — 63710000001 INSULIN DETEMIR PER 5 UNITS: Performed by: PHYSICIAN ASSISTANT

## 2019-10-15 PROCEDURE — C1894 INTRO/SHEATH, NON-LASER: HCPCS

## 2019-10-15 RX ORDER — SODIUM CHLORIDE 0.9 % (FLUSH) 0.9 %
10 SYRINGE (ML) INJECTION AS NEEDED
Status: DISCONTINUED | OUTPATIENT
Start: 2019-10-15 | End: 2019-10-23 | Stop reason: HOSPADM

## 2019-10-15 RX ORDER — SACCHAROMYCES BOULARDII 250 MG
250 CAPSULE ORAL 2 TIMES DAILY
Status: DISCONTINUED | OUTPATIENT
Start: 2019-10-15 | End: 2019-10-23 | Stop reason: HOSPADM

## 2019-10-15 RX ORDER — SODIUM CHLORIDE 0.9 % (FLUSH) 0.9 %
10 SYRINGE (ML) INJECTION EVERY 12 HOURS SCHEDULED
Status: DISCONTINUED | OUTPATIENT
Start: 2019-10-15 | End: 2019-10-23 | Stop reason: HOSPADM

## 2019-10-15 RX ADMIN — RENAGEL 800 MG: 800 TABLET ORAL at 08:28

## 2019-10-15 RX ADMIN — SODIUM CHLORIDE, PRESERVATIVE FREE 10 ML: 5 INJECTION INTRAVENOUS at 16:56

## 2019-10-15 RX ADMIN — INSULIN DETEMIR 20 UNITS: 100 INJECTION, SOLUTION SUBCUTANEOUS at 09:00

## 2019-10-15 RX ADMIN — INSULIN LISPRO 7 UNITS: 100 INJECTION, SOLUTION INTRAVENOUS; SUBCUTANEOUS at 08:28

## 2019-10-15 RX ADMIN — METOPROLOL TARTRATE 100 MG: 100 TABLET ORAL at 08:28

## 2019-10-15 RX ADMIN — VANCOMYCIN HYDROCHLORIDE 125 MG: KIT at 23:50

## 2019-10-15 RX ADMIN — Medication 250 MG: at 20:36

## 2019-10-15 RX ADMIN — INSULIN DETEMIR 20 UNITS: 100 INJECTION, SOLUTION SUBCUTANEOUS at 20:35

## 2019-10-15 RX ADMIN — RENAGEL 800 MG: 800 TABLET ORAL at 16:49

## 2019-10-15 RX ADMIN — SODIUM CHLORIDE, PRESERVATIVE FREE 10 ML: 5 INJECTION INTRAVENOUS at 20:40

## 2019-10-15 RX ADMIN — CEFTRIAXONE 2 G: 2 INJECTION, POWDER, FOR SOLUTION INTRAMUSCULAR; INTRAVENOUS at 16:56

## 2019-10-15 RX ADMIN — METOPROLOL TARTRATE 100 MG: 100 TABLET ORAL at 20:35

## 2019-10-15 RX ADMIN — DULOXETINE HYDROCHLORIDE 30 MG: 30 CAPSULE, DELAYED RELEASE ORAL at 08:28

## 2019-10-15 RX ADMIN — RENAGEL 800 MG: 800 TABLET ORAL at 12:47

## 2019-10-15 RX ADMIN — VANCOMYCIN HYDROCHLORIDE 125 MG: KIT at 16:51

## 2019-10-15 RX ADMIN — INSULIN LISPRO 7 UNITS: 100 INJECTION, SOLUTION INTRAVENOUS; SUBCUTANEOUS at 16:49

## 2019-10-15 RX ADMIN — TERAZOSIN HYDROCHLORIDE ANHYDROUS 5 MG: 5 CAPSULE ORAL at 20:36

## 2019-10-15 RX ADMIN — INSULIN LISPRO 7 UNITS: 100 INJECTION, SOLUTION INTRAVENOUS; SUBCUTANEOUS at 12:47

## 2019-10-15 RX ADMIN — AMLODIPINE BESYLATE 10 MG: 10 TABLET ORAL at 08:28

## 2019-10-15 RX ADMIN — VANCOMYCIN HYDROCHLORIDE 125 MG: KIT at 16:50

## 2019-10-15 RX ADMIN — SODIUM CHLORIDE 100 ML/HR: 9 INJECTION, SOLUTION INTRAVENOUS at 23:50

## 2019-10-15 NOTE — PROGRESS NOTES
Kendrick Crystal     LOS: 7 days     Subjective   Patient feels tired.  No fever.  White count is jumped up to 15.  No increased abdominal or flank pain  Urology is planning on cystoscopy.    Objective     Vital Signs  Vitals:    10/14/19 2052   BP: 114/76   Pulse: 76   Resp:    Temp:    SpO2:        I/O  Intake & Output (last day)       10/14 0701 - 10/15 0700    P.O. 600    I.V. (mL/kg) 1861 (16.6)    Total Intake(mL/kg) 2461 (22)    Urine (mL/kg/hr) 4350 (1.6)    Stool 0    Total Output 4350    Net -1889         Stool Unmeasured Occurrence 2 x          Physical Exam:    Abdomen soft and nontender.      Results Review:       WBC WBC   Date Value Ref Range Status   10/15/2019 15.01 (H) 3.40 - 10.80 10*3/mm3 Final   10/14/2019 11.97 (H) 3.40 - 10.80 10*3/mm3 Final   10/13/2019 12.98 (H) 3.40 - 10.80 10*3/mm3 Final      HGB Hemoglobin   Date Value Ref Range Status   10/15/2019 9.3 (L) 13.0 - 17.7 g/dL Final   10/14/2019 9.2 (L) 13.0 - 17.7 g/dL Final   10/13/2019 9.2 (L) 13.0 - 17.7 g/dL Final      HCT Hematocrit   Date Value Ref Range Status   10/15/2019 29.7 (L) 37.5 - 51.0 % Final   10/14/2019 29.0 (L) 37.5 - 51.0 % Final   10/13/2019 28.8 (L) 37.5 - 51.0 % Final      PLT        Results from last 7 days   Lab Units 10/15/19  0356   PLATELETS 10*3/mm3 345            Sodium Sodium   Date Value Ref Range Status   10/15/2019 136 136 - 145 mmol/L Final   10/14/2019 135 (L) 136 - 145 mmol/L Final   10/13/2019 132 (L) 136 - 145 mmol/L Final      Potassium Potassium   Date Value Ref Range Status   10/15/2019 3.7 3.5 - 5.2 mmol/L Final   10/14/2019 3.7 3.5 - 5.2 mmol/L Final   10/13/2019 4.1 3.5 - 5.2 mmol/L Final      Chloride Chloride   Date Value Ref Range Status   10/15/2019 100 98 - 107 mmol/L Final   10/14/2019 100 98 - 107 mmol/L Final   10/13/2019 103 98 - 107 mmol/L Final      Bicarbonate No results found for: PLASMABICARB   BUN BUN   Date Value Ref Range Status   10/15/2019 12 6 - 20 mg/dL Final   10/14/2019 10 6 -  20 mg/dL Final   10/13/2019 14 6 - 20 mg/dL Final      Creatinine Creatinine   Date Value Ref Range Status   10/15/2019 0.53 (L) 0.76 - 1.27 mg/dL Final   10/14/2019 0.45 (L) 0.76 - 1.27 mg/dL Final   10/13/2019 0.40 (L) 0.76 - 1.27 mg/dL Final      Calcium Calcium   Date Value Ref Range Status   10/15/2019 7.9 (L) 8.6 - 10.5 mg/dL Final   10/14/2019 7.7 (L) 8.6 - 10.5 mg/dL Final   10/13/2019 7.7 (L) 8.6 - 10.5 mg/dL Final        Assessment/Plan       Perinephric abscess    DUNLAP Cirrhosis    Essential hypertension    Non-compliance    Hyperlipemia    Type 2 diabetes mellitus with circulatory disorder and uncontrolled    S/P BKA (below knee amputation), left (CMS/HCC)    Gastroparesis    Acute UTI (urinary tract infection)    Bacteremia due to Klebsiella pneumoniae      Patient feels a little tired today is quite pleased.  This persists his white count climbed may need a repeat CT scan to rule out reaccumulation of abscess.    Mumtaz Payne MD  10/15/19  6:34 AM

## 2019-10-15 NOTE — PLAN OF CARE
Problem: Patient Care Overview  Goal: Plan of Care Review  Outcome: Ongoing (interventions implemented as appropriate)   10/15/19 5858   Plan of Care Review   Progress no change   OTHER   Outcome Summary VSS. Pt's IV became infiltrated early in shift. This RN unsuccessful with first try at new IV, and patient refused to have another nurse try. He stated he will need a PICC line for home antibiotics and refuses to let anyone put another IV in. Otherwise, no acute overnight events. Plan to cont to monitor.   Coping/Psychosocial   Plan of Care Reviewed With patient       Problem: Fall Risk (Adult)  Goal: Absence of Fall  Outcome: Ongoing (interventions implemented as appropriate)      Problem: Skin Injury Risk (Adult)  Goal: Skin Health and Integrity  Outcome: Ongoing (interventions implemented as appropriate)      Problem: Pain, Acute (Adult)  Goal: Acceptable Pain Control/Comfort Level  Outcome: Ongoing (interventions implemented as appropriate)      Problem: Infection, Risk/Actual (Adult)  Goal: Infection Prevention/Resolution  Outcome: Ongoing (interventions implemented as appropriate)

## 2019-10-15 NOTE — PROGRESS NOTES
[facility] Medicine Services  PROGRESS NOTE    Patient Name: Kendrick Crystal  : 1970  MRN: [id number]    Date of Admission: 10/8/2019  Primary Care Physician: No primary care provider on file.    Subjective   Subjective     CC:  Right flank pain     HPI:  Pt is seen resting up in bed in NAD.  Wife and family at bs. Pt states he has no pain but generally feels worse today.  Not sleeping well. Tolerating diet but poor appetite. No n/v.  Asking for PICC line due to loss of IV access again.  Awaiting cystoscopy.       Review of Systems  CV- No chest pain, palpitations  Resp- No cough, dyspnea  GI- No N/V/D, abd pain  Abd- no n/v, no abd pain, no diarrhea     All other systems reviewed and negative.     Objective   Objective     Vital Signs:   Temp:  [97.6 °F (36.4 °C)-98.2 °F (36.8 °C)] 97.6 °F (36.4 °C)  Heart Rate:  [66-78] 66  Resp:  [18] 18  BP: (114-132)/(71-82) 132/82        Physical Exam:  Constitutional: No acute distress, awake and resting up in bed.  Wife at bs.    HENT: NCAT, mucous membranes moist  Respiratory: Clear to auscultation bilaterally but slightly decreased at bases, respiratory effort normal on RA.    Cardiovascular: no murmurs, rubs, or gallops, palpable pedal pulses bilaterally, no edema in RLE  Gastrointestinal: Positive bowel sounds, soft, nontender, nondistended. Obese   : childs cath in place to BSD. Urine nasreen  Musculoskeletal: old left BKA c/d/i.  LLE with trace edema (stable).  And old scabs noted to right shin, healing.  Psychiatric: flat affect, cooperative calm.  Neurologic: Oriented x 3, strength symmetric in all extremities, Cranial Nerves grossly intact to confrontation, speech clear and appropriate.  Follows commands   Skin: No rashes      Results Reviewed:    Results from last 7 days   Lab Units 10/15/19  0356 10/14/19  0412 10/13/19  0400  10/09/19  0940 10/09/19  0331   WBC 10*3/mm3 15.01* 11.97* 12.98*   < >  --  29.99*   HEMOGLOBIN g/dL 9.3*  9.2* 9.2*   < >  --  10.2*   HEMATOCRIT % 29.7* 29.0* 28.8*   < >  --  31.2*   PLATELETS 10*3/mm3 345 310 264   < >  --  235   INR   --   --   --   --  1.17*  --    PROCALCITONIN ng/mL  --   --   --   --   --  1.60*    < > = values in this interval not displayed.     Results from last 7 days   Lab Units 10/15/19  0356 10/14/19  0412 10/13/19  0400  10/09/19  0331 10/08/19  2008   SODIUM mmol/L 136 135* 132*   < > 125* 124*   POTASSIUM mmol/L 3.7 3.7 4.1   < > 3.6 4.3   CHLORIDE mmol/L 100 100 103   < > 88* 84*   CO2 mmol/L 26.0 25.0 21.0*   < > 22.0 23.0   BUN mg/dL 12 10 14   < > 29* 33*   CREATININE mg/dL 0.53* 0.45* 0.40*   < > 0.75* 0.85   GLUCOSE mg/dL 91 117* 208*   < > 341* 449*   CALCIUM mg/dL 7.9* 7.7* 7.7*   < > 8.4* 9.2   ALT (SGPT) U/L  --   --   --   --   --  10   AST (SGOT) U/L  --   --   --   --   --  9   TROPONIN T ng/mL  --   --   --   --  0.031* 0.042*    < > = values in this interval not displayed.     Estimated Creatinine Clearance: 222.7 mL/min (A) (by C-G formula based on SCr of 0.53 mg/dL (L)).    Microbiology Results Abnormal     Procedure Component Value - Date/Time    Fungus Culture - Body Fluid, Peritoneum [[id number]] Collected:  10/09/19 1508    Lab Status:  Preliminary result Specimen:  Body Fluid from Peritoneum Updated:  10/14/19 1704     Fungus Culture No fungus isolated at less than 1 week    Anaerobic Culture - Body Fluid, Peritoneum [[id number]] Collected:  10/09/19 1508    Lab Status:  Final result Specimen:  Body Fluid from Peritoneum Updated:  10/14/19 0935     Anaerobic Culture No anaerobes isolated at 5 days    Urine Culture - Urine, Urine, Clean Catch [[id number]]  (Abnormal)  (Susceptibility) Collected:  10/08/19 9756    Lab Status:  Final result Specimen:  Urine, Clean Catch Updated:  10/12/19 0931     Urine Culture >100,000 CFU/mL Klebsiella pneumoniae ssp pneumoniae      >100,000 CFU/mL Escherichia coli    Susceptibility      Klebsiella pneumoniae ssp pneumoniae     EYAD      Ampicillin Resistant     Ampicillin + Sulbactam Susceptible     Cefazolin Susceptible     Cefepime Susceptible     Ceftazidime Susceptible     Ceftriaxone Susceptible     Gentamicin Susceptible     Levofloxacin Susceptible     Nitrofurantoin Intermediate     Piperacillin + Tazobactam Susceptible     Tetracycline Susceptible     Trimethoprim + Sulfamethoxazole Susceptible                Susceptibility      Escherichia coli     EYAD     Ampicillin Resistant     Ampicillin + Sulbactam Intermediate     Cefazolin Susceptible     Cefepime Susceptible     Ceftazidime Susceptible     Ceftriaxone Susceptible     Gentamicin Susceptible     Levofloxacin Resistant     Nitrofurantoin Susceptible     Piperacillin + Tazobactam Susceptible     Tetracycline Susceptible     Trimethoprim + Sulfamethoxazole Susceptible                    Body Fluid Culture - Body Fluid, Peritoneum [[id number]]  (Abnormal)  (Susceptibility) Collected:  10/09/19 1508    Lab Status:  Final result Specimen:  Body Fluid from Peritoneum Updated:  10/11/19 0621     Body Fluid Culture Moderate growth (3+) Klebsiella pneumoniae ssp pneumoniae     Gram Stain Many (4+) WBCs per low power field      Moderate (3+) Gram negative bacilli    Susceptibility      Klebsiella pneumoniae ssp pneumoniae     EYAD     Ampicillin Resistant     Ampicillin + Sulbactam Susceptible     Cefazolin Susceptible     Cefepime Susceptible     Ceftazidime Susceptible     Ceftriaxone Susceptible     Gentamicin Susceptible     Levofloxacin Susceptible     Piperacillin + Tazobactam Susceptible     Trimethoprim + Sulfamethoxazole Susceptible                    Blood Culture - Blood, Arm, Right [[id number]]  (Abnormal) Collected:  10/08/19 2012    Lab Status:  Final result Specimen:  Blood from Arm, Right Updated:  10/10/19 2122     Blood Culture Klebsiella pneumoniae ssp pneumoniae     Isolated from Aerobic and Anaerobic Bottles     Gram Stain Aerobic Bottle Gram negative bacilli      Anaerobic  Bottle Gram negative bacilli    Narrative:       Refer to blood culture collected 10/8/2019 at 2000 for MICs.    Blood Culture - Blood, Arm, Left [[id number]]  (Abnormal)  (Susceptibility) Collected:  10/08/19 2000    Lab Status:  Final result Specimen:  Blood from Arm, Left Updated:  10/10/19 2122     Blood Culture Klebsiella pneumoniae ssp pneumoniae     Isolated from Aerobic and Anaerobic Bottles     Gram Stain Anaerobic Bottle Gram negative bacilli      Aerobic Bottle Gram negative bacilli    Susceptibility      Klebsiella pneumoniae ssp pneumoniae     EYAD     Ampicillin Resistant     Ampicillin + Sulbactam Susceptible     Cefazolin Susceptible     Cefepime Susceptible     Ceftazidime Susceptible     Ceftriaxone Susceptible     Gentamicin Susceptible     Levofloxacin Susceptible     Piperacillin + Tazobactam Susceptible     Trimethoprim + Sulfamethoxazole Susceptible                    Blood Culture ID, PCR - Blood, Arm, Left [[id number]]  (Abnormal) Collected:  10/08/19 2000    Lab Status:  Final result Specimen:  Blood from Arm, Left Updated:  10/09/19 1038     BCID, PCR Klebsiella pneumoniae. Identification by BCID PCR.          Imaging Results (last 24 hours)     ** No results found for the last 24 hours. **          Results for orders placed during the hospital encounter of 05/18/19   Adult Transthoracic Echo Complete W/ Cont if Necessary Per Protocol    Narrative · Estimated EF = 60%.  · Mild mitral valve regurgitation is present  · Mild tricuspid valve regurgitation is present.  · Calculated right ventricular systolic pressure from tricuspid   regurgitation is 29 mmHg.          I have reviewed the medications:  Scheduled Meds:    amLODIPine 10 mg Oral Q24H   ceftriaxone 2 g Intravenous Q24H   DULoxetine 30 mg Oral Daily   insulin detemir 20 Units Subcutaneous Q12H   insulin lispro 0-9 Units Subcutaneous 4x Daily With Meals & Nightly   insulin lispro 7 Units Subcutaneous TID With Meals   metoprolol tartrate  100 mg Oral Q12H   sevelamer 800 mg Oral TID With Meals   sodium chloride 10 mL Intravenous Q12H   terazosin 5 mg Oral Nightly   vancomycin 125 mg Oral Q6H     Continuous Infusions:    sodium chloride 100 mL/hr Last Rate: 100 mL/hr (10/14/19 9347)     PRN Meds:.•  acetaminophen **OR** acetaminophen **OR** acetaminophen  •  dextrose  •  dextrose  •  glucagon (human recombinant)  •  HYDROcodone-acetaminophen  •  LORazepam  •  melatonin  •  ondansetron  •  potassium chloride **OR** potassium chloride **OR** potassium chloride  •  sodium chloride      Assessment/Plan   Assessment / Plan     Active Hospital Problems    Diagnosis  POA   • **Perinephric abscess [N15.1]  Yes   • Bacteremia due to Klebsiella pneumoniae [R78.81]  Unknown   • Acute UTI (urinary tract infection) [N39.0]  Yes   • Gastroparesis [K31.84]  Yes   • S/P BKA (below knee amputation), left (CMS/HCC) [Z89.512]  Not Applicable   • Type 2 diabetes mellitus with circulatory disorder and uncontrolled [E11.59]  Yes   • DUNLAP Cirrhosis [K74.60]  Yes   • Essential hypertension [I10]  Yes   • Hyperlipemia [E78.5]  Yes   • Non-compliance [Z91.14]  Not Applicable      Resolved Hospital Problems   No resolved problems to display.        Brief Hospital Course to date:  Kendrick Crystal is a 49 y.o. male  50 yo gentleman new patient for me today, hx of T2DM, hypertension, hx of multiple skin/abscesses/MRSA infection, DUNLAP cirrhosis, PVD s/p L BKA, and  presented with sepsis 2/2 renal and perinephric abscess s/p IR guided drainage of abscess culture growing Klebsiella pneumoniae currently on meropenem since 10/9.    Assessment/Plan:    Sepsis 2/2 perinephric abscess and UTI   Klebsiella pneuomniae bacteremia  -ID following, on rocephin   -cultures from peritoneum and blood cultures from 10/10 growing Klebsiella resistant to ampicillin, if urine culture also growing klebsiella pneumoniae can de-escalate to IV ceftriaxone   -- abx de-escalated to rocephin  stable.  --urology consulted placed childs at bedside.  Possible need for cystoscopy, awaiting urology final recommendations and treatment plan later today.  --CRS consulted wants conservative management for now for planned cystoscopy per urology tentatively tomorrow  --WBC up to 15 today.  ID/CRS aware.  Ck am labs   --will place PICC today due to poor IV access and long term IV abx planned per ID     Hyponatremia   -- improved    -- suspect hypovolemic hyponatremia related to po intake cont IVF's for now   -- consider NAL if worsens     DUNLAP Cirrhosis  -- stable avoid hepatotoxins      Right lower extremity pain above knee:   no edema or redness on  Exam, ckd duplex RLE to rule out DVT, was negative     BPH: continue terazosin qnightly  Intermittent hematuria  --Urology/CRS following  --childs placed by urology   Plan for cystoscopy by Dr Gutierres tomorrow    HTN: continue lopressor 100 mg q12h and amlodipine 10 mg q 24h    T2DM poorly controlled  (A1c 14.10)  Glucoses running 116-164.  Generally stable and improved on current regimen.  continue levemir 20u sc q12h and correction dose insulin.   continue current mealtime insulin.  --stable. No change     DVT Prophylaxis:  mechanical    Disposition: I expect the patient to be discharged when appropriate antibiotic selection can be made, plan for SNF upon discharge. CM on board     CODE STATUS:   Code Status and Medical Interventions:   Ordered at: 10/08/19 1554     Level Of Support Discussed With:    Patient     Code Status:    CPR     Medical Interventions (Level of Support Prior to Arrest):    Full         Electronically signed by CHINTAN Gallagher, 10/15/19, 12:22 PM.

## 2019-10-15 NOTE — PROGRESS NOTES
"Daily Progress Note    Patient: Kendrick MADRID [place]  [facility] Day: 7    Subjective: Pt without complaint.  VSS AF      Objective:     /82 (BP Location: Right arm, Patient Position: Lying)   Pulse 66   Temp 97.6 °F (36.4 °C) (Oral)   Resp 18   Ht 190.5 cm (75\")   Wt 112 kg (246 lb)   SpO2 94%   BMI 30.75 kg/m²       Intake/Output Summary (Last 24 hours) at 10/15/2019 1720  Last data filed at 10/15/2019 0900  Gross per 24 hour   Intake 2461 ml   Output 2050 ml   Net 411 ml       Review of Systems:    Physical Exam:   General Appearance: alert, appears stated age and cooperative  Head: normocephalic, without obvious abnormality and atraumatic  Lungs respirations regular  Alert and oriented      Lab Results (last 24 hours)     Procedure Component Value Units Date/Time    POC Glucose Once [[id number]]  (Normal) Collected:  10/15/19 1626    Specimen:  Blood Updated:  10/15/19 1633     Glucose 87 mg/dL     POC Glucose Once [[id number]]  (Abnormal) Collected:  10/15/19 1134    Specimen:  Blood Updated:  10/15/19 1147     Glucose 142 mg/dL     POC Glucose Once [[id number]]  (Normal) Collected:  10/15/19 0739    Specimen:  Blood Updated:  10/15/19 0741     Glucose 103 mg/dL     Basic Metabolic Panel [[id number]]  (Abnormal) Collected:  10/15/19 0356    Specimen:  Blood Updated:  10/15/19 0602     Glucose 91 mg/dL      BUN 12 mg/dL      Creatinine 0.53 mg/dL      Sodium 136 mmol/L      Potassium 3.7 mmol/L      Chloride 100 mmol/L      CO2 26.0 mmol/L      Calcium 7.9 mg/dL      eGFR Non African Amer >150 mL/min/1.73      BUN/Creatinine Ratio 22.6     Anion Gap 10.0 mmol/L     Narrative:       GFR Normal >60  Chronic Kidney Disease <60  Kidney Failure <15    CBC & Differential [[id number]] Collected:  10/15/19 0356    Specimen:  Blood Updated:  10/15/19 0536    Narrative:       The following orders were created for panel order CBC & Differential.  Procedure                               Abnormality         Status            "          ---------                               -----------         ------                     CBC Auto Differential[[id number]]        Abnormal            Final result                 Please view results for these tests on the individual orders.    CBC Auto Differential [[id number]]  (Abnormal) Collected:  10/15/19 0356    Specimen:  Blood Updated:  10/15/19 0536     WBC 15.01 10*3/mm3      RBC 3.46 10*6/mm3      Hemoglobin 9.3 g/dL      Hematocrit 29.7 %      MCV 85.8 fL      MCH 26.9 pg      MCHC 31.3 g/dL      RDW 14.4 %      RDW-SD 44.9 fl      MPV 11.4 fL      Platelets 345 10*3/mm3      Neutrophil % 81.1 %      Lymphocyte % 10.1 %      Monocyte % 6.6 %      Eosinophil % 0.8 %      Basophil % 0.3 %      Immature Grans % 1.1 %      Neutrophils, Absolute 12.17 10*3/mm3      Lymphocytes, Absolute 1.52 10*3/mm3      Monocytes, Absolute 0.99 10*3/mm3      Eosinophils, Absolute 0.12 10*3/mm3      Basophils, Absolute 0.05 10*3/mm3      Immature Grans, Absolute 0.16 10*3/mm3      nRBC 0.0 /100 WBC     POC Glucose Once [[id number]]  (Abnormal) Collected:  10/14/19 2048    Specimen:  Blood Updated:  10/14/19 2049     Glucose 174 mg/dL           Imaging Results (last 24 hours)     ** No results found for the last 24 hours. **          Assessment/Plan: Discussed plan for cystoscopy tomorrow afternoon and npo after breakfast in am.         Patient Active Problem List   Diagnosis   • DUNLAP Cirrhosis   • Essential hypertension   • Non-compliance   • Hyperlipemia   • Hypertriglyceridemia, essential   • Sepsis affecting skin   • Type 2 diabetes mellitus with circulatory disorder and uncontrolled   • History of MRSA infection   • S/P BKA (below knee amputation), left (CMS/HCC)   • Peripheral vascular disease (CMS/HCC)   • DM (diabetes mellitus) type II uncontrolled, periph vascular disorder (CMS/HCC)   • Obesity (BMI 30-39.9)   • Gastroparesis   • Perinephric abscess   • Acute UTI (urinary tract infection)   • Bacteremia due to  Klebsiella pneumoniae        Diagnosis Plan   1. Perinephric abscess     2. Sepsis, due to unspecified organism, unspecified whether acute organ dysfunction present (CMS/HCC)     3. Dehydration     4. Uncontrolled type 2 diabetes mellitus with hyperglycemia (CMS/HCC)     5. Essential hypertension     6. History of MRSA infection     7. Cirrhosis of liver without ascites, unspecified hepatic cirrhosis type (CMS/HCC)     8. Impaired mobility and ADLs           Brad Gutierres MD - 10/15/2019, 5:20 PM

## 2019-10-15 NOTE — PROGRESS NOTES
[facility] Progress Note        Antibiotics:  Anti-Infectives (From admission, onward)    Ordered     Dose/Rate Route Frequency Start Stop    10/15/19 1127  vancomycin oral solution reconstituted 125 mg     Ordering Provider:  Usman Dong MD    125 mg Oral Every 6 Hours Scheduled 10/15/19 1215 10/29/19 1159    10/12/19 1339  cefTRIAXone (ROCEPHIN) 2 g/100 mL 0.9% NS VTB (EVENS)     Ordering Provider:  Ayad Blount MD    2 g  over 30 Minutes Intravenous Every 24 Hours 10/12/19 1500 10/22/19 1459    10/09/19 1136  meropenem (MERREM) 1 g/100 mL 0.9% NS VTB (mbp)     Ordering Provider:  Usman Dong MD    1 g  over 30 Minutes Intravenous Once 10/09/19 1230 10/09/19 1251    10/08/19 2338  piperacillin-tazobactam (ZOSYN) 3.375 g in iso-osmotic dextrose 50 ml (premix)     Ordering Provider:  Yasmany Martinez PA-C    3.375 g  over 30 Minutes Intravenous Once 10/08/19 2340 10/09/19 0016          CC: fatigue    HPI:  Patient is a 49 y.o.  Yr old male with history of poorly contolled T2DM, DUNLAP/liver cirrhosis, HTN, PVD/Left BKA, and multiple skin abscesses/MRSA who presented to [facility] ED with right shin wound infection summer 2018.  He was unable to get to see Dr. Martinez as his father passed away unexpectedly on 18 and he had to wait until after the .  The wound worsened becoming gangrenous and he came to [facility] ED in 2018.  He also had been hospitalized at [facility] and then transferred to  for a severe penis/scrotum infection requiring surgical debridement in summer 2018.  He received antibiotics regarding his right leg infection, generally improved over the remainder of summer 2018 but that area had not completely healed. He was admitted 2019 with acute left lower abdominal wall redness/swelling and pain, spontaneous drainage associated with fever/chills and uncontrolled blood sugars.   I&D requiring wide debridement , severe/deep infection and transferred to  [facility] on May 3 with IV Zosyn/oral doxycycline. Cultures had lactobacillus and mixed gram-positive skin sherly including alpha strep, staph epidermidis and corynebacterium. Readmitted to [facility] May 18, 2019, increasing generalized edema ultimately transitioned to oral doxycycline and healed.     He presented to the emergency room July 30, 2019 with acute rectal pain that had begun 7/27; this is associated with constipation for days, chills and nausea/dry heaving.  White blood cell count elevated, high hemoglobin A1c over 13, urinalysis with hematuria/pyuria and CT scan with 3 x 3 cm fluid collection anterior to the distal rectum and per discussion with Dr. Akbar/Jennifer, this is not in the prostate.  Colorectal surgery/urology saw him for consideration of surgical options/timing/threshold, etc..     8/2/19 I&Dper Dr Payne perirectal abscess; patient reports that he had instrumented his rectum prior to hospitalization with enema     8/3/19 urinary retention overnight requiring in/out catheterization per patient.  Creat climb     8/4/19 further creat climb; childs placed and lisinopril stopped and d/w Dr Rubio;  ?obstruction initially associated with retention postop (1200 out with I/O cath postop per nursing) -v- contrast from CT -v- lisinopril/medication/vancomycin -v- relative hypotension -v- likely combination of factors with ATN; nephrology following; vancomycin trough high and vancomycin stopped empirically 8/3 although high trough potentially accumulation as a consequence of diminishing renal function associated with retention/contrast/pressure rather than cause.  Nonetheless, avoid for now; creatinine trending down.        8/7 in retrospect he remembers air in his urine at admit which has raised concern for possible fistula from urinary tract;  No fecaluria and culture from abscess is fecal sherly;  ?rectal-urethral fistula;  Dr Payne aware     Culture with E. coli/Klebsiella species and  "creatinine generally trended down, transitioned to oral antibiotics at discharge.     Readmitted August 17, 2019 with nausea/vomiting/dry heaves for 3 days.  Walker catheter was placed and CT scan of the abdomen showed:      \"Previously seen fluid collection identified inferior to the prostate gland is more increased in density on today's examination. There is wall thickening again seen throughout the bladder. Findings again are concerning for cystitis.\" per radiology     He was transitioned to oral omnicef/topical antifungal on approx 8/23/19; Noncompliant with f/u in our office     READMITTED 10/8/19 RLQ abd pain, urinary retention, nausea, dysuria and generalized fatigue     UTI by U/A, subsequent blood cultures positive for GNR and initial PCR with kleb sp, no carbapenem resistance by initial PCR per my d/w micro;  Abnormal CT abd with right perinephric fluid collection, advanced cirrhosis, urinary bladder thickening.  10/9 Aspirate of perinephric fluid collection consistent with abscess/gram-negative lon and white blood cells     10/11/19 generally fatigued.  Loose stools x 2; He currently denies abd pain;  Walker in and no overt hematuria or pyuria;  He denied any  fecaluria prior to admit to me.       No headache photophobia or neck stiffness.  No shortness of breath cough or hemoptysis.  No diarrhea.  No hematochezia melena or hematemesis.  No skin rash.     ROS:      10/15/19 No f/c/s. No n/v/d. No rash. No new ADR to Abx.     Constitutional-- No Fever, chills or sweats.  Appetite diminished with fatigue  Heent-- No new vision, hearing or throat complaints.  No epistaxis or oral sores.  Denies odynophagia or dysphagia.  No flashers, floaters or eye pain. No odynophagia or dysphagia. No headache, photophobia or neck stiffness.  CV-- No chest pain, palpitation or syncope  Resp-- No SOB/cough/Hemoptysis  GI-  No hematochezia, melena, or hematemesis. Denies jaundice or chronic liver disease.  -- No dysuria, " "hematuria, or flank pain.  Denies hesitancy, urgency or flank pain.  Lymph- no swollen lymph nodes in neck/axilla or groin.   Heme- No active bruising or bleeding; no Hx of DVT or PE.  MS-- no swelling or pain in the bones or joints of arms/legs.  No new back pain.  Neuro-- No acute focal weakness or numbness in the arms or legs.  No seizures.     Full 12 point review of systems reviewed and negative otherwise for acute complaints, except for above      PE:   /82 (BP Location: Right arm, Patient Position: Lying)   Pulse 66   Temp 97.6 °F (36.4 °C) (Oral)   Resp 18   Ht 190.5 cm (75\")   Wt 112 kg (246 lb)   SpO2 94%   BMI 30.75 kg/m²     GENERAL: awake, in no acute distress.   HEENT: Normocephalic, atraumatic.  PERRL. EOMI. No conjunctival injection. No icterus. Oropharynx clear without evidence of thrush or exudate. No evidence of peridontal disease.    NECK: Supple without nuchal rigidity. No mass.  LYMPH: No cervical, axillary or inguinal lymphadenopathy.  HEART: RRR; No murmur, rubs, gallops.   LUNGS: Diminished at bases bilaterally without wheezing, rales, rhonchi. Normal respiratory effort. Nonlabored. No dullness.  ABDOMEN: Soft, nontender, nondistended. Positive bowel sounds. No rebound or guarding. NO mass or HSM.  EXT:  No cyanosis, clubbing or edema. No cord.  : +childs    MSK: FROM without joint effusions noted arms/legs.    SKIN: Warm and dry without cutaneous eruptions on Inspection/palpation.    NEURO: awake     Amputee status noted with left BKA     No CVA tenderness     No peripheral stigmata/phenomena of endocarditis    Laboratory Data    Results from last 7 days   Lab Units 10/15/19  0356 10/14/19  0412 10/13/19  0400   WBC 10*3/mm3 15.01* 11.97* 12.98*   HEMOGLOBIN g/dL 9.3* 9.2* 9.2*   HEMATOCRIT % 29.7* 29.0* 28.8*   PLATELETS 10*3/mm3 345 310 264     Results from last 7 days   Lab Units 10/15/19  0356   SODIUM mmol/L 136   POTASSIUM mmol/L 3.7   CHLORIDE mmol/L 100   CO2 mmol/L " 26.0   BUN mg/dL 12   CREATININE mg/dL 0.53*   GLUCOSE mg/dL 91   CALCIUM mg/dL 7.9*     Results from last 7 days   Lab Units 10/08/19  2008   ALK PHOS U/L 207*   BILIRUBIN mg/dL 0.4   ALT (SGPT) U/L 10   AST (SGOT) U/L 9               Estimated Creatinine Clearance: 222.7 mL/min (A) (by C-G formula based on SCr of 0.53 mg/dL (L)).      Microbiology:      Radiology:  Imaging Results (last 72 hours)     Procedure Component Value Units Date/Time    CT Guided Abscess Drain Retroperitoneal [[id number]] Collected:  10/09/19 1611     Updated:  10/09/19 1715    Narrative:       EXAMINATION: CT GUIDED ABSCESS DRAIN RETROPERITONEAL- 10/09/2019     INDICATION: N15.1-Renal and perinephric abscess; A41.9-Sepsis,  unspecified organism; E86.0-Dehydration; E11.65-Type 2 diabetes mellitus  with hyperglycemia; I10-Essential (primary) hypertension;  Z86.14-Personal history of Methicillin resistant Staphylococcus aureus  infection; K74.60-Unspecified cirrhosis of liver; fluid collection     TECHNIQUE: CT-guided drainage of a perinephric fluid collection     The radiation dose reduction device was turned on for each scan per the  ALARA (As Low as Reasonably Achievable) protocol.     COMPARISON: NONE     FINDINGS: Patient referred for CT-guided drainage of a perinephric fluid  collection. Procedure and risks were explained to the patient. Informed  consent was obtained and signed. Patient placed in the left lateral  position upon the table. The right flank was prepped and draped in a  sterile fashion. 1% lidocaine used as a local anesthetic. Under CT  fluoroscopic guidance an 18-gauge 15 cm Chiba needle was advanced into  the perinephric fluid collection of approximately 10 ml of a reddish  fluid was removed with some debris within the fluid. There is a striated  delayed nephrogram seen of the right kidney suggesting possible  pyelonephritis with surrounding stranding of the perinephric fat.  Myelolipoma seen on the adrenal gland on the  left. Chiba needle was  removed and no immediate complication occurred. Slight decrease seen in  size of the perinephric fluid collection status post drainage.       Impression:       CT-guided drainage of a perinephric fluid collection with no  immediate complication.     D:  10/09/2019  E:  10/09/2019     This report was finalized on 10/9/2019 5:12 PM by Dr. Nella Enriquez MD.       CT Abdomen Pelvis With Contrast [[id number]] Collected:  10/08/19 2202     Updated:  10/08/19 2222    Narrative:       CT ABDOMEN AND PELVIS WITH CONTRAST, 10/8/2019    HISTORY:  49-year-old male in the ED complaining of 2 day history right side abdomen pain, nausea, vomiting and generalized weakness. History of hepatic cirrhosis (DUNLAP). History of diabetes with left BKA and multiple abdominal wall abscesses and lower extremity  cellulitis in 2018.    TECHNIQUE:  CT imaging of the abdomen and pelvis with IV contrast. Radiation dose reduction techniques included automated exposure control. Radiation audit for CT and nuclear cardiology exams in the last 12 months: 7.    COMPARISON:  *  CT abdomen/pelvis, 8/18/2019.    ABDOMEN FINDINGS:  Images show a right lower perinephric fluid collection that is either subcapsular or pericapsular. This extends along the lower kidney for a length of about 8.5 cm and has a maximum thickness of 2.5 cm. The fluid is mixed attenuation and may be  hemorrhagic. Correlate for any recent history of blunt trauma to the flank. Renal parenchyma shows no disruption or abnormal enhancement. There is no fracture of overlying right lower posterior ribs or transverse spinous processes. Given the history,  perinephric abscess is possible but is significantly less likely given normal renal parenchymal enhancement. No evidence of urinary obstruction. No visible nephrolithiasis.    Advanced hepatic cirrhosis. Mild splenomegaly. No suspicious liver lesion. No upper abdominal ascites. Hepatic vasculature appears patent.  No gallbladder distention or bile duct dilatation. Negative pancreas.    Benign left adrenal myelolipoma measuring about 2.9 cm. Normal caliber abdominal aorta. Small bowel and colon are normal in caliber and appearance, as imaged. The appendix is normal. No gastric distention or distal esophageal dilatation.    PELVIS FINDINGS:  Air within the urinary bladder, likely iatrogenic. There is at least mild diffuse bladder wall thickening. Prostate and rectum are unremarkable. No inguinal hernia.    Lung base images show multiple benign calcified granulomas.      Impression:       1.  Small mixed attenuation right perinephric fluid collection along the inferior capsule, not present on the prior exam. Perinephric hematoma is a consideration. Correlate clinically for any history of blunt flank trauma. While abscess is possible, this  is unlikely as right renal parenchyma enhances normally with no evidence of pyelonephritis. Laboratory correlation recommended.  2.  Advanced hepatic cirrhosis. Splenomegaly.  3.  Urinary bladder wall thickening. Gas within the bladder is most likely iatrogenic.  4.  Benign left adrenal myelolipoma.    Signer Name: Fadi Healy MD   Signed: 10/8/2019 10:02 PM   Workstation Name: [facility]    Radiology [facility]    XR Chest PA & Lateral [[id number]] Collected:  10/08/19 2210     Updated:  10/08/19 2212    Narrative:       CHEST X-RAY, 10/8/2019      HISTORY:    49-year-old male in the ED with reported diabetic ketoacidosis.      TECHNIQUE:  AP portable upright chest series.      FINDINGS:  Heart size and pulmonary vascularity are normal. The lungs are expanded and clear. No visible pulmonary infiltrate or pleural effusion. Lower lung benign calcified granulomas.      Impression:       Negative chest.    Signer Name: Fadi Healy MD   Signed: 10/8/2019 10:10 PM   [facility]    Radiology [facility]            Impression:       --Acute Kleb septicemia/sepsis, likely urinary source/pyelnoephritis associated with urinary retention and  with abnormal CT scan with perinephric collection/abscess and Kleb on aspirate;  IV rocephin ongoing; urology to determine any timing/option or threshold for further intervention or functional/anatomic assessment.    --Acute perinephric abscess as above;  ?repeat imaging to assess for progress -v- other sequelae    --acute loose stools with Hx CDiff per him;  empiri coral vancomycin added with high risk for relapse     --Hx perirectal abscess ; Colorectal surgery, Dr. Payne previously saw and is following.  Drainage as above on August 2 with mxed Fecal sherly in culture; CT scans  with no mention of abscess on 8/18 study;  Dr Payne  following given urology conern for fistula and to help determine any timing/option or threshold for further GI/colorectal work-up     --History urinary retention.  urology following     --Hx ARF in August 2019;  ?obstruction initially associated with retention postop (1200 out with I/O cath postop per nursing) -v- contrast from CT -v- lisinopril/medication/vancomycin -v- relative hypotension -v- likely combination of factors with ATN;  vancomycin trough high and vancomycin stopped empirically 8/3 although high trough potentially accumulation as a consequence of diminishing renal function associated with retention/contrast/pressure rather than cause.  Nonetheless, avoid for now; likely further acute kidney injury at admission August 17 associated with dehydration/prerenal/ATN etiology     --History MRSA;  Not in current culture     --Diabetes mellitus type 2, uncontrolled.  He reports the reason for this is forgetfulness     --History Johnston and chronic liver disease/cirrhosis  per past notes     --Left BKA     --Peripheral vascular disease     --medical noncompliance    PLAN:     --IV rocephin/oral vancomycin     --I discussed potential risks and benefits of the prescribed  antibiotics that include, but are not limited to, solid organ toxicity, neuro toxicity, renal toxicity, CDiff, cytopenias, hypersensitivity,  etc.. Patient/Family voice understanding and agree to proceed.     --Check/review labs cultures and scans     --Discussed with microbiology     --History per nursing staff     --Discussed with Dr Blount     --Discussed with family, partial history per them     --Highly complex set of issues with high risk for further serious morbidity and other serious sequela     --Colorectal surgery and urology to follow      Usman Dong MD  10/15/2019    10/16/19 08:47  Addendum to correct above date entered incorrectly 10/11/19 should be 10/16/19 as follows (I did not have visit on 10/11):    10/16/19 generally fatigued.  Loose stools x 2; He currently denies abd pain;  Walker in and no overt hematuria or pyuria;  He denied any  fecaluria prior to admit to me.       No headache photophobia or neck stiffness.  No shortness of breath cough or hemoptysis.  No diarrhea.  No hematochezia melena or hematemesis.  No skin rash.    10/16/19 12:25 correction to above addendum which also has incorrect date for HPI information; FOR 10/15/19 date of service, HPI is as follows:    10/15/19 generally fatigued.  Loose stools x 2; He currently denies abd pain;  Walker in and no overt hematuria or pyuria;  He denied any  fecaluria prior to admit to me.       No headache photophobia or neck stiffness.  No shortness of breath cough or hemoptysis.  No diarrhea.  No hematochezia melena or hematemesis.  No skin rash.

## 2019-10-15 NOTE — PROGRESS NOTES
Clinical Nutrition   Reason For Visit: [facility]    Patient Name: Kendrick Crystal  YOB: 1970  MRN: [id number]  Date of Encounter: 10/15/19 11:49 AM  Admission date: 10/8/2019      Nutrition Assessment     Admission Problem List:  Nausea  Dry heaves  Abdominal pain  Diarrhea  UTI  Sepsis  Right perinephritic abscess  ?urethral-intestinal fistula      Applicable Nutrition-Related Information:  Left BKA (2017)  I&D of abdominal wall abscess (4/26/19)  Excision and drainage of perirectal abscess (8/2/19)      Applicable tests/procedures during this admission:  (10/9) s/p claudia-renal fluid collection drained - 10 mL        PMH: He  has a past medical history of Abdominal wall cellulitis (5/20/2019), JUAN (acute kidney injury) (CMS/HCC) (7/29/2018), Arthritis, Bipolar 1 disorder (CMS/HCC), Cellulitis of right anterior lower leg (7/29/2018), Cirrhosis (CMS/HCC), Counseling for insulin pump, Depression, Diabetes mellitus (CMS/HCC), Encephalopathy, hepatic (CMS/HCC), H/O degenerative disc disease, History of Lissa's gangrene, Hyperlipemia, Hypertension, multiple Skin abscesses, DUNLAP, bx showed stage IV fibrosis, Neuropathy, Peripheral vascular disease (CMS/HCC), and Thrombophlebitis.   PSxH: He  has a past surgical history that includes Testicle surgery (Right); Leg Surgery; Finger amputation (Left, 1/30/2017); Leg amputation, lower tibia/fibula (Left, 3/2/2017); Esophagogastroduodenoscopy; Tonsillectomy (1975); Abdominal Wall Abscess Incision and Drainage (N/A, 4/26/2019); and Hemorrhoid surgery (N/A, 8/2/2019).        Reported/Observed/Food/Nutrition Related History   Patient and spouse present during visit. Patient reports appetite and PO intake fair at this time. Drinking some of the Boost Glucose Control supplements, prefers chocolate flavor. Pt requests chopped meats, regular textures otherwise. RD explained guest meal trays to spouse.      Anthropometrics   Height: 75 in  Weight: 246 lbs (bedscale weight  10/11 per nsg doc)  BMI: N/A (left BKA)  UBW: 230 lbs (8/17/19 bedscale wt per EMR)      Labs reviewed   Labs reviewed: Yes    Medications reviewed   Medications reviewed: Yes  Pertinent: insulin, sevelamer, rocephin  GTT: NS @ 100 ml/hr    Current Nutrition Prescription   PO: Diet Regular; Cardiac, Consistent Carbohydrate   Oral Nutrition Supplement: Boost Glucose Control 3x daily    Evaluated Nutrient/Fluid Intake: 40% / 10 meals      Nutrition Diagnosis     10/9  Problem Food and nutrition knowledge deficit   Etiology Unwilling to apply information   Signs/Symptoms HgbA1c = 14.1 (10/8); patient states he eats whatever he wants and takes his insulin only when he remembers   Status: unresolved - patient refused education and written materials on 10/9    10/15  Problem Inadequate oral intake   Etiology Decreased appetite   Signs/Symptoms PO intake: 40% / 10 meals       Intervention   Intervention: Follow treatment progress, Care plan reviewed, Interview for preferences, Encourage intake       Goal:   General: Nutrition support treatment   PO: Increase intake (consistent consumption of >/=67% at meals)      Monitoring/Evaluation:     Monitoring/Evaluation: Per protocol, PO intake, Supplement intake, Weight  Will Continue to follow per protocol  Mary Cao RD  Time Spent: 30 min

## 2019-10-16 ENCOUNTER — ANESTHESIA EVENT (OUTPATIENT)
Dept: PERIOP | Facility: HOSPITAL | Age: 49
End: 2019-10-16

## 2019-10-16 ENCOUNTER — ANESTHESIA (OUTPATIENT)
Dept: PERIOP | Facility: HOSPITAL | Age: 49
End: 2019-10-16

## 2019-10-16 LAB
ALBUMIN SERPL-MCNC: 2 G/DL (ref 3.5–5.2)
ALBUMIN/GLOB SERPL: 0.6 G/DL
ALP SERPL-CCNC: 261 U/L (ref 39–117)
ALT SERPL W P-5'-P-CCNC: 16 U/L (ref 1–41)
ANION GAP SERPL CALCULATED.3IONS-SCNC: 9 MMOL/L (ref 5–15)
AST SERPL-CCNC: 17 U/L (ref 1–40)
BASOPHILS # BLD AUTO: 0.05 10*3/MM3 (ref 0–0.2)
BASOPHILS NFR BLD AUTO: 0.3 % (ref 0–1.5)
BILIRUB SERPL-MCNC: <0.2 MG/DL (ref 0.2–1.2)
BUN BLD-MCNC: 16 MG/DL (ref 6–20)
BUN/CREAT SERPL: 30.8 (ref 7–25)
CALCIUM SPEC-SCNC: 7.6 MG/DL (ref 8.6–10.5)
CHLORIDE SERPL-SCNC: 100 MMOL/L (ref 98–107)
CO2 SERPL-SCNC: 25 MMOL/L (ref 22–29)
CREAT BLD-MCNC: 0.52 MG/DL (ref 0.76–1.27)
DEPRECATED RDW RBC AUTO: 44.8 FL (ref 37–54)
EOSINOPHIL # BLD AUTO: 0.11 10*3/MM3 (ref 0–0.4)
EOSINOPHIL NFR BLD AUTO: 0.7 % (ref 0.3–6.2)
ERYTHROCYTE [DISTWIDTH] IN BLOOD BY AUTOMATED COUNT: 14.4 % (ref 12.3–15.4)
GFR SERPL CREATININE-BSD FRML MDRD: >150 ML/MIN/1.73
GLOBULIN UR ELPH-MCNC: 3.6 GM/DL
GLUCOSE BLD-MCNC: 170 MG/DL (ref 65–99)
GLUCOSE BLDC GLUCOMTR-MCNC: 106 MG/DL (ref 70–130)
GLUCOSE BLDC GLUCOMTR-MCNC: 111 MG/DL (ref 70–130)
GLUCOSE BLDC GLUCOMTR-MCNC: 173 MG/DL (ref 70–130)
GLUCOSE BLDC GLUCOMTR-MCNC: 80 MG/DL (ref 70–130)
HCT VFR BLD AUTO: 28.3 % (ref 37.5–51)
HGB BLD-MCNC: 8.8 G/DL (ref 13–17.7)
IMM GRANULOCYTES # BLD AUTO: 0.16 10*3/MM3 (ref 0–0.05)
IMM GRANULOCYTES NFR BLD AUTO: 1 % (ref 0–0.5)
LYMPHOCYTES # BLD AUTO: 2.13 10*3/MM3 (ref 0.7–3.1)
LYMPHOCYTES NFR BLD AUTO: 13.5 % (ref 19.6–45.3)
MCH RBC QN AUTO: 26.7 PG (ref 26.6–33)
MCHC RBC AUTO-ENTMCNC: 31.1 G/DL (ref 31.5–35.7)
MCV RBC AUTO: 85.8 FL (ref 79–97)
MONOCYTES # BLD AUTO: 0.98 10*3/MM3 (ref 0.1–0.9)
MONOCYTES NFR BLD AUTO: 6.2 % (ref 5–12)
NEUTROPHILS # BLD AUTO: 12.3 10*3/MM3 (ref 1.7–7)
NEUTROPHILS NFR BLD AUTO: 78.3 % (ref 42.7–76)
NRBC BLD AUTO-RTO: 0 /100 WBC (ref 0–0.2)
PLATELET # BLD AUTO: 348 10*3/MM3 (ref 140–450)
PMV BLD AUTO: 10.9 FL (ref 6–12)
POTASSIUM BLD-SCNC: 3.6 MMOL/L (ref 3.5–5.2)
PROT SERPL-MCNC: 5.6 G/DL (ref 6–8.5)
RBC # BLD AUTO: 3.3 10*6/MM3 (ref 4.14–5.8)
SODIUM BLD-SCNC: 134 MMOL/L (ref 136–145)
WBC NRBC COR # BLD: 15.73 10*3/MM3 (ref 3.4–10.8)

## 2019-10-16 PROCEDURE — 25010000002 CEFTRIAXONE PER 250 MG: Performed by: INTERNAL MEDICINE

## 2019-10-16 PROCEDURE — C1769 GUIDE WIRE: HCPCS | Performed by: UROLOGY

## 2019-10-16 PROCEDURE — 63710000001 INSULIN DETEMIR PER 5 UNITS: Performed by: PHYSICIAN ASSISTANT

## 2019-10-16 PROCEDURE — 25010000002 PROPOFOL 10 MG/ML EMULSION: Performed by: NURSE ANESTHETIST, CERTIFIED REGISTERED

## 2019-10-16 PROCEDURE — 25010000002 ONDANSETRON PER 1 MG: Performed by: NURSE ANESTHETIST, CERTIFIED REGISTERED

## 2019-10-16 PROCEDURE — 0TJB8ZZ INSPECTION OF BLADDER, VIA NATURAL OR ARTIFICIAL OPENING ENDOSCOPIC: ICD-10-PCS | Performed by: UROLOGY

## 2019-10-16 PROCEDURE — 99233 SBSQ HOSP IP/OBS HIGH 50: CPT | Performed by: INTERNAL MEDICINE

## 2019-10-16 PROCEDURE — 85025 COMPLETE CBC W/AUTO DIFF WBC: CPT | Performed by: NURSE PRACTITIONER

## 2019-10-16 PROCEDURE — 82962 GLUCOSE BLOOD TEST: CPT

## 2019-10-16 PROCEDURE — 80053 COMPREHEN METABOLIC PANEL: CPT | Performed by: NURSE PRACTITIONER

## 2019-10-16 PROCEDURE — 25010000002 FENTANYL CITRATE (PF) 100 MCG/2ML SOLUTION: Performed by: NURSE ANESTHETIST, CERTIFIED REGISTERED

## 2019-10-16 RX ORDER — SODIUM CHLORIDE, SODIUM LACTATE, POTASSIUM CHLORIDE, CALCIUM CHLORIDE 600; 310; 30; 20 MG/100ML; MG/100ML; MG/100ML; MG/100ML
9 INJECTION, SOLUTION INTRAVENOUS CONTINUOUS
Status: DISCONTINUED | OUTPATIENT
Start: 2019-10-16 | End: 2019-10-17

## 2019-10-16 RX ORDER — FAMOTIDINE 20 MG/1
20 TABLET, FILM COATED ORAL ONCE
Status: COMPLETED | OUTPATIENT
Start: 2019-10-16 | End: 2019-10-16

## 2019-10-16 RX ORDER — HYDROMORPHONE HYDROCHLORIDE 1 MG/ML
0.5 INJECTION, SOLUTION INTRAMUSCULAR; INTRAVENOUS; SUBCUTANEOUS
Status: DISCONTINUED | OUTPATIENT
Start: 2019-10-16 | End: 2019-10-16 | Stop reason: HOSPADM

## 2019-10-16 RX ORDER — ONDANSETRON 2 MG/ML
4 INJECTION INTRAMUSCULAR; INTRAVENOUS ONCE AS NEEDED
Status: DISCONTINUED | OUTPATIENT
Start: 2019-10-16 | End: 2019-10-16 | Stop reason: HOSPADM

## 2019-10-16 RX ORDER — PROPOFOL 10 MG/ML
VIAL (ML) INTRAVENOUS AS NEEDED
Status: DISCONTINUED | OUTPATIENT
Start: 2019-10-16 | End: 2019-10-16 | Stop reason: SURG

## 2019-10-16 RX ORDER — FENTANYL CITRATE 50 UG/ML
50 INJECTION, SOLUTION INTRAMUSCULAR; INTRAVENOUS
Status: DISCONTINUED | OUTPATIENT
Start: 2019-10-16 | End: 2019-10-16 | Stop reason: HOSPADM

## 2019-10-16 RX ORDER — LABETALOL HYDROCHLORIDE 5 MG/ML
5 INJECTION, SOLUTION INTRAVENOUS
Status: DISCONTINUED | OUTPATIENT
Start: 2019-10-16 | End: 2019-10-16 | Stop reason: HOSPADM

## 2019-10-16 RX ORDER — LIDOCAINE HYDROCHLORIDE 10 MG/ML
INJECTION, SOLUTION EPIDURAL; INFILTRATION; INTRACAUDAL; PERINEURAL AS NEEDED
Status: DISCONTINUED | OUTPATIENT
Start: 2019-10-16 | End: 2019-10-16 | Stop reason: SURG

## 2019-10-16 RX ORDER — FENTANYL CITRATE 50 UG/ML
INJECTION, SOLUTION INTRAMUSCULAR; INTRAVENOUS AS NEEDED
Status: DISCONTINUED | OUTPATIENT
Start: 2019-10-16 | End: 2019-10-16 | Stop reason: SURG

## 2019-10-16 RX ORDER — ONDANSETRON 2 MG/ML
INJECTION INTRAMUSCULAR; INTRAVENOUS AS NEEDED
Status: DISCONTINUED | OUTPATIENT
Start: 2019-10-16 | End: 2019-10-16 | Stop reason: SURG

## 2019-10-16 RX ADMIN — SODIUM CHLORIDE, PRESERVATIVE FREE 10 ML: 5 INJECTION INTRAVENOUS at 20:08

## 2019-10-16 RX ADMIN — INSULIN LISPRO 2 UNITS: 100 INJECTION, SOLUTION INTRAVENOUS; SUBCUTANEOUS at 08:57

## 2019-10-16 RX ADMIN — FENTANYL CITRATE 100 MCG: 50 INJECTION, SOLUTION INTRAMUSCULAR; INTRAVENOUS at 17:12

## 2019-10-16 RX ADMIN — ONDANSETRON 4 MG: 2 INJECTION INTRAMUSCULAR; INTRAVENOUS at 17:12

## 2019-10-16 RX ADMIN — Medication 250 MG: at 08:56

## 2019-10-16 RX ADMIN — VANCOMYCIN HYDROCHLORIDE 125 MG: KIT at 23:19

## 2019-10-16 RX ADMIN — METOPROLOL TARTRATE 100 MG: 100 TABLET ORAL at 08:56

## 2019-10-16 RX ADMIN — INSULIN DETEMIR 20 UNITS: 100 INJECTION, SOLUTION SUBCUTANEOUS at 20:03

## 2019-10-16 RX ADMIN — INSULIN DETEMIR 20 UNITS: 100 INJECTION, SOLUTION SUBCUTANEOUS at 08:57

## 2019-10-16 RX ADMIN — TERAZOSIN HYDROCHLORIDE ANHYDROUS 5 MG: 5 CAPSULE ORAL at 20:04

## 2019-10-16 RX ADMIN — FAMOTIDINE 20 MG: 20 TABLET ORAL at 16:10

## 2019-10-16 RX ADMIN — LIDOCAINE HYDROCHLORIDE 60 MG: 10 INJECTION, SOLUTION EPIDURAL; INFILTRATION; INTRACAUDAL; PERINEURAL at 17:12

## 2019-10-16 RX ADMIN — SODIUM CHLORIDE, POTASSIUM CHLORIDE, SODIUM LACTATE AND CALCIUM CHLORIDE 9 ML/HR: 600; 310; 30; 20 INJECTION, SOLUTION INTRAVENOUS at 16:09

## 2019-10-16 RX ADMIN — METOPROLOL TARTRATE 100 MG: 100 TABLET ORAL at 20:04

## 2019-10-16 RX ADMIN — SODIUM CHLORIDE 100 ML/HR: 9 INJECTION, SOLUTION INTRAVENOUS at 18:17

## 2019-10-16 RX ADMIN — RENAGEL 800 MG: 800 TABLET ORAL at 08:56

## 2019-10-16 RX ADMIN — AMLODIPINE BESYLATE 10 MG: 10 TABLET ORAL at 08:56

## 2019-10-16 RX ADMIN — SODIUM CHLORIDE 100 ML/HR: 9 INJECTION, SOLUTION INTRAVENOUS at 10:17

## 2019-10-16 RX ADMIN — PROPOFOL 150 MG: 10 INJECTION, EMULSION INTRAVENOUS at 17:12

## 2019-10-16 RX ADMIN — INSULIN LISPRO 7 UNITS: 100 INJECTION, SOLUTION INTRAVENOUS; SUBCUTANEOUS at 08:56

## 2019-10-16 RX ADMIN — CEFTRIAXONE 2 G: 2 INJECTION, POWDER, FOR SOLUTION INTRAMUSCULAR; INTRAVENOUS at 15:25

## 2019-10-16 RX ADMIN — VANCOMYCIN HYDROCHLORIDE 125 MG: KIT at 06:10

## 2019-10-16 RX ADMIN — Medication 250 MG: at 20:04

## 2019-10-16 RX ADMIN — SODIUM CHLORIDE, PRESERVATIVE FREE 10 ML: 5 INJECTION INTRAVENOUS at 20:05

## 2019-10-16 NOTE — PROGRESS NOTES
Continued Stay Note  [facility]     Patient Name: Kendrick Crystal  MRN: [id number]  Today's Date: 10/16/2019    Admit Date: 10/8/2019    Discharge Plan     Row Name 10/16/19 1253       Plan    Plan  Home with HH    Patient/Family in Agreement with Plan  yes    Plan Comments  Spoke with patient's wife.  No changes in DC plan to return home with [facility] services.  Will need HH order closer to discharge.     Final Discharge Disposition Code  06 - home with home health care        Discharge Codes    No documentation.       Expected Discharge Date and Time     Expected Discharge Date Expected Discharge Time    Oct 17, 2019             Barb Batista RN

## 2019-10-16 NOTE — OP NOTE
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    INDICATIONS: Patient with a multiple medical problems and a recent history of perirectal abscess and urinary tract infections presents for cystoscopic evaluation to rule out colovesical fistula.    DESCRIPTION OF OPERATION: Patient taken to the operating room after informed consent was obtained.  Placed on the operating room table in the supine position and general anesthesia administered.  He had been on preoperative IV antibiotics.  After anesthesia he was placed into the dorsal lithotomy position.  His left stump placed onto the legging and secured.  He was prepped and draped in standard surgical fashion.  The 21 Latvian cystoscope introduced into the urethral meatus.  He did have a indwelling Walker catheter that was removed prior to prep and drape.  The urethra was evaluated as we passed the scope and in the anterior and posterior urethra were within normal limits.  There is no evidence of any prostatic urethral abnormalities.  Bladder was entered and examined in a systematic fashion.  The ureteral orifices in the normal anatomic position with clear reflux of urine.  There was some bullous edema at the floor the bladder but no other abnormalities were noted.  There is no trabeculation or cellules or diverticula formation.  Attempt was made to cannulate a couple of areas and the bullous edema that may be a fistulous tracts but there was no  continuity.  The scope removed and a 16 Arabic Walker catheter was replaced.  Patient tolerated the procedure well there are no complications.        Brda Gutierres MD  [unfilled]

## 2019-10-16 NOTE — PLAN OF CARE
Problem: Patient Care Overview  Goal: Plan of Care Review  Outcome: Ongoing (interventions implemented as appropriate)   10/16/19 0523   Plan of Care Review   Progress no change   OTHER   Outcome Summary VSS. no complaints of pain. Pt rested throughout night. wife at bedside. Will continue to monitor    Coping/Psychosocial   Plan of Care Reviewed With patient       Problem: Fall Risk (Adult)  Goal: Absence of Fall  Outcome: Ongoing (interventions implemented as appropriate)   10/16/19 0523   Fall Risk (Adult)   Absence of Fall making progress toward outcome       Problem: Skin Injury Risk (Adult)  Goal: Skin Health and Integrity  Outcome: Ongoing (interventions implemented as appropriate)   10/16/19 0523   Skin Injury Risk (Adult)   Skin Health and Integrity making progress toward outcome       Problem: Pain, Acute (Adult)  Goal: Acceptable Pain Control/Comfort Level  Outcome: Ongoing (interventions implemented as appropriate)   10/16/19 0523   Pain, Acute (Adult)   Acceptable Pain Control/Comfort Level making progress toward outcome

## 2019-10-16 NOTE — BRIEF OP NOTE
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 (Active)   Daily Indications Placement by  physician 10/16/2019  8:00 AM   Site Assessment Clean;Skin intact 10/16/2019  8:00 AM   Collection Container Standard drainage bag 10/16/2019  8:00 AM   Securement Method Securing device 10/16/2019  8:00 AM   Catheter care complete Yes 10/16/2019  3:08 PM   Output (mL) 850 mL 10/16/2019 11:46 AM       Findings: bullous edema on floor of bladder likely from his indwelling childs.  Unable to cannulate a fistula.    Complications: none      Brad Gutierres MD     Date: 10/16/2019  Time: 5:43 PM

## 2019-10-16 NOTE — ANESTHESIA PREPROCEDURE EVALUATION
Anesthesia Evaluation                  Airway   Mallampati: I  TM distance: >3 FB  Neck ROM: full  No difficulty expected  Dental      Pulmonary    Cardiovascular     ECG reviewed    (+) hypertension, PVD, hyperlipidemia,       Neuro/Psych  GI/Hepatic/Renal/Endo    (+) obesity,   hepatitis, liver disease, renal disease, diabetes mellitus,     Musculoskeletal     Abdominal    Substance History      OB/GYN          Other                        Anesthesia Plan    ASA 3     general     intravenous induction   Anesthetic plan, all risks, benefits, and alternatives have been provided, discussed and informed consent has been obtained with: patient.    Plan discussed with CRNA.

## 2019-10-16 NOTE — PROGRESS NOTES
[facility] Progress Note        Antibiotics:  Anti-Infectives (From admission, onward)    Ordered     Dose/Rate Route Frequency Start Stop    10/15/19 1127  vancomycin oral solution reconstituted 125 mg     Ordering Provider:  Usman Dong MD    125 mg Oral Every 6 Hours Scheduled 10/15/19 1215 10/29/19 1159    10/12/19 1339  cefTRIAXone (ROCEPHIN) 2 g/100 mL 0.9% NS VTB (EVENS)     Ordering Provider:  Ayad Blount MD    2 g  over 30 Minutes Intravenous Every 24 Hours 10/12/19 1500 10/22/19 1459    10/09/19 1136  meropenem (MERREM) 1 g/100 mL 0.9% NS VTB (mbp)     Ordering Provider:  Usman Dong MD    1 g  over 30 Minutes Intravenous Once 10/09/19 1230 10/09/19 1251    10/08/19 2338  piperacillin-tazobactam (ZOSYN) 3.375 g in iso-osmotic dextrose 50 ml (premix)     Ordering Provider:  Yasmany Martinez PA-C    3.375 g  over 30 Minutes Intravenous Once 10/08/19 2340 10/09/19 0016          CC: fatigue    HPI:  Patient is a 49 y.o.  Yr old male with history of poorly contolled T2DM, DUNLAP/liver cirrhosis, HTN, PVD/Left BKA, and multiple skin abscesses/MRSA who presented to [facility] ED with right shin wound infection summer 2018.  He was unable to get to see Dr. Martinez as his father passed away unexpectedly on 18 and he had to wait until after the .  The wound worsened becoming gangrenous and he came to [facility] ED in 2018.  He also had been hospitalized at [facility] and then transferred to  for a severe penis/scrotum infection requiring surgical debridement in summer 2018.  He received antibiotics regarding his right leg infection, generally improved over the remainder of summer 2018 but that area had not completely healed. He was admitted 2019 with acute left lower abdominal wall redness/swelling and pain, spontaneous drainage associated with fever/chills and uncontrolled blood sugars.   I&D requiring wide debridement , severe/deep infection and transferred to  [facility] on May 3 with IV Zosyn/oral doxycycline. Cultures had lactobacillus and mixed gram-positive skin sherly including alpha strep, staph epidermidis and corynebacterium. Readmitted to [facility] May 18, 2019, increasing generalized edema ultimately transitioned to oral doxycycline and healed.     He presented to the emergency room July 30, 2019 with acute rectal pain that had begun 7/27; this is associated with constipation for days, chills and nausea/dry heaving.  White blood cell count elevated, high hemoglobin A1c over 13, urinalysis with hematuria/pyuria and CT scan with 3 x 3 cm fluid collection anterior to the distal rectum and per discussion with Dr. Akbar/Jennifer, this is not in the prostate.  Colorectal surgery/urology saw him for consideration of surgical options/timing/threshold, etc..     8/2/19 I&Dper Dr Payne perirectal abscess; patient reports that he had instrumented his rectum prior to hospitalization with enema     8/3/19 urinary retention overnight requiring in/out catheterization per patient.  Creat climb     8/4/19 further creat climb; childs placed and lisinopril stopped and d/w Dr Rubio;  ?obstruction initially associated with retention postop (1200 out with I/O cath postop per nursing) -v- contrast from CT -v- lisinopril/medication/vancomycin -v- relative hypotension -v- likely combination of factors with ATN; nephrology following; vancomycin trough high and vancomycin stopped empirically 8/3 although high trough potentially accumulation as a consequence of diminishing renal function associated with retention/contrast/pressure rather than cause.  Nonetheless, avoid for now; creatinine trending down.        8/7 in retrospect he remembers air in his urine at admit which has raised concern for possible fistula from urinary tract;  No fecaluria and culture from abscess is fecal sherly;  ?rectal-urethral fistula;  Dr Payne aware     Culture with E. coli/Klebsiella species and  "creatinine generally trended down, transitioned to oral antibiotics at discharge.     Readmitted August 17, 2019 with nausea/vomiting/dry heaves for 3 days.  Walker catheter was placed and CT scan of the abdomen showed:      \"Previously seen fluid collection identified inferior to the prostate gland is more increased in density on today's examination. There is wall thickening again seen throughout the bladder. Findings again are concerning for cystitis.\" per radiology     He was transitioned to oral omnicef/topical antifungal on approx 8/23/19; Noncompliant with f/u in our office     READMITTED 10/8/19 RLQ abd pain, urinary retention, nausea, dysuria and generalized fatigue     UTI by U/A, subsequent blood cultures positive for GNR and initial PCR with kleb sp, no carbapenem resistance by initial PCR per my d/w micro;  Abnormal CT abd with right perinephric fluid collection, advanced cirrhosis, urinary bladder thickening.  10/9 Aspirate of perinephric fluid collection consistent with abscess/gram-negative lon and white blood cells     10/16/19 cytoscopy planned;  intermittent diarrhea; He currently denies abd pain;  Walker in and no overt hematuria or pyuria;  He denied any  fecaluria prior to admit to me.       No headache photophobia or neck stiffness.  No shortness of breath cough or hemoptysis.  No diarrhea.  No hematochezia melena or hematemesis.  No skin rash.     ROS:      10/16/19 No f/c/s. No n/v/d. No rash. No new ADR to Abx.     Constitutional-- No Fever, chills or sweats.  Appetite diminished with fatigue  Heent-- No new vision, hearing or throat complaints.  No epistaxis or oral sores.  Denies odynophagia or dysphagia.  No flashers, floaters or eye pain. No odynophagia or dysphagia. No headache, photophobia or neck stiffness.  CV-- No chest pain, palpitation or syncope  Resp-- No SOB/cough/Hemoptysis  GI-  No hematochezia, melena, or hematemesis. Denies jaundice or chronic liver disease.  -- No dysuria, " "hematuria, or flank pain.  Denies hesitancy, urgency or flank pain.  Lymph- no swollen lymph nodes in neck/axilla or groin.   Heme- No active bruising or bleeding; no Hx of DVT or PE.  MS-- no swelling or pain in the bones or joints of arms/legs.  No new back pain.  Neuro-- No acute focal weakness or numbness in the arms or legs.  No seizures.     Full 12 point review of systems reviewed and negative otherwise for acute complaints, except for above      PE:   /82   Pulse 77   Temp 97.7 °F (36.5 °C) (Oral)   Resp 18   Ht 190.5 cm (75\")   Wt 112 kg (246 lb)   SpO2 94%   BMI 30.75 kg/m²     GENERAL: awake, in no acute distress.   HEENT: Normocephalic, atraumatic.  PERRL. EOMI. No conjunctival injection. No icterus. Oropharynx clear without evidence of thrush or exudate. No evidence of peridontal disease.    NECK: Supple without nuchal rigidity. No mass.  LYMPH: No cervical, axillary or inguinal lymphadenopathy.  HEART: RRR; No murmur, rubs, gallops.   LUNGS: Diminished at bases bilaterally without wheezing, rales, rhonchi. Normal respiratory effort. Nonlabored. No dullness.  ABDOMEN: Soft, nontender, nondistended. Positive bowel sounds. No rebound or guarding. NO mass or HSM.  EXT:  No cyanosis, clubbing or edema. No cord.  : +childs    MSK: FROM without joint effusions noted arms/legs.    SKIN: Warm and dry without cutaneous eruptions on Inspection/palpation.    NEURO: awake     Amputee status noted with left BKA     No CVA tenderness     No peripheral stigmata/phenomena of endocarditis    Laboratory Data    Results from last 7 days   Lab Units 10/16/19  0625 10/15/19  0356 10/14/19  0412   WBC 10*3/mm3 15.73* 15.01* 11.97*   HEMOGLOBIN g/dL 8.8* 9.3* 9.2*   HEMATOCRIT % 28.3* 29.7* 29.0*   PLATELETS 10*3/mm3 348 345 310     Results from last 7 days   Lab Units 10/16/19  0625   SODIUM mmol/L 134*   POTASSIUM mmol/L 3.6   CHLORIDE mmol/L 100   CO2 mmol/L 25.0   BUN mg/dL 16   CREATININE mg/dL 0.52* "   GLUCOSE mg/dL 170*   CALCIUM mg/dL 7.6*     Results from last 7 days   Lab Units 10/16/19  0625   ALK PHOS U/L 261*   BILIRUBIN mg/dL <0.2*   ALT (SGPT) U/L 16   AST (SGOT) U/L 17               Estimated Creatinine Clearance: 227 mL/min (A) (by C-G formula based on SCr of 0.52 mg/dL (L)).      Microbiology:      Radiology:  Imaging Results (last 72 hours)     Procedure Component Value Units Date/Time    CT Guided Abscess Drain Retroperitoneal [[id number]] Collected:  10/09/19 1611     Updated:  10/09/19 1715    Narrative:       EXAMINATION: CT GUIDED ABSCESS DRAIN RETROPERITONEAL- 10/09/2019     INDICATION: N15.1-Renal and perinephric abscess; A41.9-Sepsis,  unspecified organism; E86.0-Dehydration; E11.65-Type 2 diabetes mellitus  with hyperglycemia; I10-Essential (primary) hypertension;  Z86.14-Personal history of Methicillin resistant Staphylococcus aureus  infection; K74.60-Unspecified cirrhosis of liver; fluid collection     TECHNIQUE: CT-guided drainage of a perinephric fluid collection     The radiation dose reduction device was turned on for each scan per the  ALARA (As Low as Reasonably Achievable) protocol.     COMPARISON: NONE     FINDINGS: Patient referred for CT-guided drainage of a perinephric fluid  collection. Procedure and risks were explained to the patient. Informed  consent was obtained and signed. Patient placed in the left lateral  position upon the table. The right flank was prepped and draped in a  sterile fashion. 1% lidocaine used as a local anesthetic. Under CT  fluoroscopic guidance an 18-gauge 15 cm Chiba needle was advanced into  the perinephric fluid collection of approximately 10 ml of a reddish  fluid was removed with some debris within the fluid. There is a striated  delayed nephrogram seen of the right kidney suggesting possible  pyelonephritis with surrounding stranding of the perinephric fat.  Myelolipoma seen on the adrenal gland on the left. Chiba needle was  removed and no  immediate complication occurred. Slight decrease seen in  size of the perinephric fluid collection status post drainage.       Impression:       CT-guided drainage of a perinephric fluid collection with no  immediate complication.     D:  10/09/2019  E:  10/09/2019     This report was finalized on 10/9/2019 5:12 PM by Dr. Nella Enriquez MD.       CT Abdomen Pelvis With Contrast [[id number]] Collected:  10/08/19 2202     Updated:  10/08/19 2222    Narrative:       CT ABDOMEN AND PELVIS WITH CONTRAST, 10/8/2019    HISTORY:  49-year-old male in the ED complaining of 2 day history right side abdomen pain, nausea, vomiting and generalized weakness. History of hepatic cirrhosis (DUNLAP). History of diabetes with left BKA and multiple abdominal wall abscesses and lower extremity  cellulitis in 2018.    TECHNIQUE:  CT imaging of the abdomen and pelvis with IV contrast. Radiation dose reduction techniques included automated exposure control. Radiation audit for CT and nuclear cardiology exams in the last 12 months: 7.    COMPARISON:  *  CT abdomen/pelvis, 8/18/2019.    ABDOMEN FINDINGS:  Images show a right lower perinephric fluid collection that is either subcapsular or pericapsular. This extends along the lower kidney for a length of about 8.5 cm and has a maximum thickness of 2.5 cm. The fluid is mixed attenuation and may be  hemorrhagic. Correlate for any recent history of blunt trauma to the flank. Renal parenchyma shows no disruption or abnormal enhancement. There is no fracture of overlying right lower posterior ribs or transverse spinous processes. Given the history,  perinephric abscess is possible but is significantly less likely given normal renal parenchymal enhancement. No evidence of urinary obstruction. No visible nephrolithiasis.    Advanced hepatic cirrhosis. Mild splenomegaly. No suspicious liver lesion. No upper abdominal ascites. Hepatic vasculature appears patent. No gallbladder distention or bile duct  dilatation. Negative pancreas.    Benign left adrenal myelolipoma measuring about 2.9 cm. Normal caliber abdominal aorta. Small bowel and colon are normal in caliber and appearance, as imaged. The appendix is normal. No gastric distention or distal esophageal dilatation.    PELVIS FINDINGS:  Air within the urinary bladder, likely iatrogenic. There is at least mild diffuse bladder wall thickening. Prostate and rectum are unremarkable. No inguinal hernia.    Lung base images show multiple benign calcified granulomas.      Impression:       1.  Small mixed attenuation right perinephric fluid collection along the inferior capsule, not present on the prior exam. Perinephric hematoma is a consideration. Correlate clinically for any history of blunt flank trauma. While abscess is possible, this  is unlikely as right renal parenchyma enhances normally with no evidence of pyelonephritis. Laboratory correlation recommended.  2.  Advanced hepatic cirrhosis. Splenomegaly.  3.  Urinary bladder wall thickening. Gas within the bladder is most likely iatrogenic.  4.  Benign left adrenal myelolipoma.    Signer Name: Fadi Healy MD   Signed: 10/8/2019 10:02 PM   Workstation [facility]: [facility]    Radiology [facility]    XR Chest PA & Lateral [[id number]] Collected:  10/08/19 2210     Updated:  10/08/19 2212    Narrative:       CHEST X-RAY, 10/8/2019      HISTORY:    49-year-old male in the ED with reported diabetic ketoacidosis.      TECHNIQUE:  AP portable upright chest series.      FINDINGS:  Heart size and pulmonary vascularity are normal. The lungs are expanded and clear. No visible pulmonary infiltrate or pleural effusion. Lower lung benign calcified granulomas.      Impression:       Negative chest.    Signer Name: Fadi Healy MD   Signed: 10/8/2019 10:10 PM   Workstation [facility]: [facility]    Radiology [facility]            Impression:      --Acute Kleb septicemia/sepsis, likely  urinary source/pyelnoephritis associated with urinary retention and  with abnormal CT scan with perinephric collection/abscess and Kleb on aspirate;  IV rocephin ongoing; urology to determine any timing/option or threshold for further intervention or functional/anatomic assessment.    --Acute perinephric abscess as above;  ?repeat imaging to assess for progress -v- other sequelae    --acute loose stools/intermittent diarrhea; with Hx CDiff per him/family;  Empiric oral vancomycin added with high risk for relapse     --Hx perirectal abscess ; Colorectal surgery, Dr. Payne previously saw and is following.  Drainage as above on August 2 with mxed Fecal sherly in culture; CT scans  with no mention of abscess on 8/18 study;  Dr Payne  following given urology conern for fistula and to help determine any timing/option or threshold for further GI/colorectal work-up     --History urinary retention.  urology following     --Hx ARF in August 2019;  ?obstruction initially associated with retention postop (1200 out with I/O cath postop per nursing) -v- contrast from CT -v- lisinopril/medication/vancomycin -v- relative hypotension -v- likely combination of factors with ATN;  vancomycin trough high and vancomycin stopped empirically 8/3 although high trough potentially accumulation as a consequence of diminishing renal function associated with retention/contrast/pressure rather than cause.  Nonetheless, avoid for now; likely further acute kidney injury at admission August 17 associated with dehydration/prerenal/ATN etiology     --History MRSA;  Not in current culture     --Diabetes mellitus type 2, uncontrolled.  He reports the reason for this is forgetfulness     --History [place] and chronic liver disease/cirrhosis  per past notes     --Left BKA     --Peripheral vascular disease     --medical noncompliance    PLAN:     --IV rocephin/oral vancomycin      --Check/review labs cultures and scans     --Discussed with  microbiology     --History per nursing staff     --Discussed with Dr Payne     --Discussed with family, partial history per them     --Highly complex set of issues with high risk for further serious morbidity and other serious sequela     --Colorectal surgery and urology to follow      Usman Dong MD  10/16/2019

## 2019-10-16 NOTE — PROGRESS NOTES
[facility] Medicine Services  PROGRESS NOTE    Patient Name: Kendrick Crystal  : 1970  MRN: [id number]    Date of Admission: 10/8/2019  Primary Care Physician: No primary care provider on file.    Subjective   Subjective     CC:  Follow up for klebsiella septicemia    HPI:  No acute events overnight. Denies pain in abdomen, back, or pelvis.     Review of Systems  Gen- No fevers, chills  CV- No chest pain, palpitations  Resp- No cough, dyspnea      All other systems reviewed and negative.     Objective   Objective     Vital Signs:   Temp:  [97.7 °F (36.5 °C)] 97.7 °F (36.5 °C)  Heart Rate:  [73-77] 73  Resp:  [18] 18  BP: (113-153)/(72-89) 113/72        Physical Exam:  Constitutional: No acute distress, awake, alert  HENT: NCAT, mucous membranes moist  Psychiatric: Appropriate affect, cooperative  Neurologic: Oriented x 3, Cranial Nerves grossly intact to confrontation, speech clear    Results Reviewed:    Results from last 7 days   Lab Units 10/16/19  0625 10/15/19  0356 10/14/19  0412   WBC 10*3/mm3 15.73* 15.01* 11.97*   HEMOGLOBIN g/dL 8.8* 9.3* 9.2*   HEMATOCRIT % 28.3* 29.7* 29.0*   PLATELETS 10*3/mm3 348 345 310     Results from last 7 days   Lab Units 10/16/19  0625 10/15/19  0356 10/14/19  0412   SODIUM mmol/L 134* 136 135*   POTASSIUM mmol/L 3.6 3.7 3.7   CHLORIDE mmol/L 100 100 100   CO2 mmol/L 25.0 26.0 25.0   BUN mg/dL 16 12 10   CREATININE mg/dL 0.52* 0.53* 0.45*   GLUCOSE mg/dL 170* 91 117*   CALCIUM mg/dL 7.6* 7.9* 7.7*   ALT (SGPT) U/L 16  --   --    AST (SGOT) U/L 17  --   --      Estimated Creatinine Clearance: 227 mL/min (A) (by C-G formula based on SCr of 0.52 mg/dL (L)).    Microbiology Results Abnormal     Procedure Component Value - Date/Time    Fungus Culture - Body Fluid, Peritoneum [[id number]] Collected:  10/09/19 1508    Lab Status:  Preliminary result Specimen:  Body Fluid from Peritoneum Updated:  10/14/19 7273     Fungus Culture No fungus isolated at  less than 1 week    Anaerobic Culture - Body Fluid, Peritoneum [[id number]] Collected:  10/09/19 1508    Lab Status:  Final result Specimen:  Body Fluid from Peritoneum Updated:  10/14/19 0935     Anaerobic Culture No anaerobes isolated at 5 days    Urine Culture - Urine, Urine, Clean Catch [[id number]]  (Abnormal)  (Susceptibility) Collected:  10/08/19 0796    Lab Status:  Final result Specimen:  Urine, Clean Catch Updated:  10/12/19 0931     Urine Culture >100,000 CFU/mL Klebsiella pneumoniae ssp pneumoniae      >100,000 CFU/mL Escherichia coli    Susceptibility      Klebsiella pneumoniae ssp pneumoniae     EYAD     Ampicillin Resistant     Ampicillin + Sulbactam Susceptible     Cefazolin Susceptible     Cefepime Susceptible     Ceftazidime Susceptible     Ceftriaxone Susceptible     Gentamicin Susceptible     Levofloxacin Susceptible     Nitrofurantoin Intermediate     Piperacillin + Tazobactam Susceptible     Tetracycline Susceptible     Trimethoprim + Sulfamethoxazole Susceptible                Susceptibility      Escherichia coli     EYAD     Ampicillin Resistant     Ampicillin + Sulbactam Intermediate     Cefazolin Susceptible     Cefepime Susceptible     Ceftazidime Susceptible     Ceftriaxone Susceptible     Gentamicin Susceptible     Levofloxacin Resistant     Nitrofurantoin Susceptible     Piperacillin + Tazobactam Susceptible     Tetracycline Susceptible     Trimethoprim + Sulfamethoxazole Susceptible                    Body Fluid Culture - Body Fluid, Peritoneum [[id number]]  (Abnormal)  (Susceptibility) Collected:  10/09/19 1508    Lab Status:  Final result Specimen:  Body Fluid from Peritoneum Updated:  10/11/19 0621     Body Fluid Culture Moderate growth (3+) Klebsiella pneumoniae ssp pneumoniae     Gram Stain Many (4+) WBCs per low power field      Moderate (3+) Gram negative bacilli    Susceptibility      Klebsiella pneumoniae ssp pneumoniae     EYAD     Ampicillin Resistant     Ampicillin + Sulbactam  Susceptible     Cefazolin Susceptible     Cefepime Susceptible     Ceftazidime Susceptible     Ceftriaxone Susceptible     Gentamicin Susceptible     Levofloxacin Susceptible     Piperacillin + Tazobactam Susceptible     Trimethoprim + Sulfamethoxazole Susceptible                    Blood Culture - Blood, Arm, Right [[id number]]  (Abnormal) Collected:  10/08/19 2012    Lab Status:  Final result Specimen:  Blood from Arm, Right Updated:  10/10/19 2122     Blood Culture Klebsiella pneumoniae ssp pneumoniae     Isolated from Aerobic and Anaerobic Bottles     Gram Stain Aerobic Bottle Gram negative bacilli      Anaerobic Bottle Gram negative bacilli    Narrative:       Refer to blood culture collected 10/8/2019 at 2000 for MICs.    Blood Culture - Blood, Arm, Left [[id number]]  (Abnormal)  (Susceptibility) Collected:  10/08/19 2000    Lab Status:  Final result Specimen:  Blood from Arm, Left Updated:  10/10/19 2122     Blood Culture Klebsiella pneumoniae ssp pneumoniae     Isolated from Aerobic and Anaerobic Bottles     Gram Stain Anaerobic Bottle Gram negative bacilli      Aerobic Bottle Gram negative bacilli    Susceptibility      Klebsiella pneumoniae ssp pneumoniae     EYAD     Ampicillin Resistant     Ampicillin + Sulbactam Susceptible     Cefazolin Susceptible     Cefepime Susceptible     Ceftazidime Susceptible     Ceftriaxone Susceptible     Gentamicin Susceptible     Levofloxacin Susceptible     Piperacillin + Tazobactam Susceptible     Trimethoprim + Sulfamethoxazole Susceptible                    Blood Culture ID, PCR - Blood, Arm, Left [[id number]]  (Abnormal) Collected:  10/08/19 2000    Lab Status:  Final result Specimen:  Blood from Arm, Left Updated:  10/09/19 1038     BCID, PCR Klebsiella pneumoniae. Identification by BCID PCR.          Imaging Results (last 24 hours)     ** No results found for the last 24 hours. **          Results for orders placed during the hospital encounter of 05/18/19   Adult  Transthoracic Echo Complete W/ Cont if Necessary Per Protocol    Narrative · Estimated EF = 60%.  · Mild mitral valve regurgitation is present  · Mild tricuspid valve regurgitation is present.  · Calculated right ventricular systolic pressure from tricuspid   regurgitation is 29 mmHg.          I have reviewed the medications:  Scheduled Meds:  amLODIPine 10 mg Oral Q24H   [MAR Hold] ceftriaxone 2 g Intravenous Q24H   [MAR Hold] DULoxetine 30 mg Oral Daily   [MAR Hold] insulin detemir 20 Units Subcutaneous Q12H   [MAR Hold] insulin lispro 0-9 Units Subcutaneous 4x Daily With Meals & Nightly   [MAR Hold] insulin lispro 7 Units Subcutaneous TID With Meals   metoprolol tartrate 100 mg Oral Q12H   [MAR Hold] saccharomyces boulardii 250 mg Oral BID   [MAR Hold] sevelamer 800 mg Oral TID With Meals   [MAR Hold] sodium chloride 10 mL Intravenous Q12H   [MAR Hold] sodium chloride 10 mL Intravenous Q12H   terazosin 5 mg Oral Nightly   [MAR Hold] vancomycin 125 mg Oral Q6H     Continuous Infusions:  sodium chloride 100 mL/hr Last Rate: 100 mL/hr (10/16/19 1017)     PRN Meds:.•  [MAR Hold] acetaminophen **OR** [MAR Hold] acetaminophen **OR** [MAR Hold] acetaminophen  •  [MAR Hold] dextrose  •  [MAR Hold] dextrose  •  [MAR Hold] glucagon (human recombinant)  •  [MAR Hold] HYDROcodone-acetaminophen  •  [MAR Hold] LORazepam  •  [MAR Hold] melatonin  •  [MAR Hold] ondansetron  •  potassium chloride **OR** potassium chloride **OR** potassium chloride  •  [MAR Hold] sodium chloride  •  [MAR Hold] sodium chloride      Assessment/Plan   Assessment / Plan     Active Hospital Problems    Diagnosis  POA   • **Perinephric abscess [N15.1]  Yes   • Bacteremia due to Klebsiella pneumoniae [R78.81]  Unknown   • Acute UTI (urinary tract infection) [N39.0]  Yes   • Gastroparesis [K31.84]  Yes   • S/P BKA (below knee amputation), left (CMS/HCC) [Z89.512]  Not Applicable   • Type 2 diabetes mellitus with circulatory disorder and uncontrolled  [E11.59]  Yes   • DUNLAP Cirrhosis [K74.60]  Yes   • Essential hypertension [I10]  Yes   • Hyperlipemia [E78.5]  Yes   • Non-compliance [Z91.14]  Not Applicable      Resolved Hospital Problems   No resolved problems to display.        Brief Hospital Course to date:  Kendrick Crystal is a 49 y.o. male  50 yo gentleman new patient for me today, hx of T2DM, hypertension, hx of multiple skin/abscesses/MRSA infection, DUNLAP cirrhosis, PVD s/p L BKA, and  presented with sepsis 2/2 renal and perinephric abscess s/p IR guided drainage of abscess culture growing Klebsiella pneumoniae currently on ceftriaxone.    Sepsis 2/2 perinephric abscess and UTI, Klebsiella pneuomniae bacteremia  -cultures from peritoneum and blood cultures from 10/10 growing Klebsiella resistant to ampicillin   -ID following, on ceftriaxone (end date 10/22/2019)  -PICC placed yesterday  -urology plan for cystoscopy today to evaluate for pneumaturia, may have fistula from bowel to bladder ?   -will continue medical management for now    Hx of C diff with intermittent loose stool: continue vancomycin (end date 10/29/2019)    Hx of perirectal abscess: CT abd pelvis on 10/8 showed small right perinephric fluid collection along inferior capsule, advanced cirrhosis, Dr Payne not recommending surgery at this time, will continue medical management     HTN: continue lopressor 100 mg q12h and amlodipine 10 mg q24h    T2DM poorly controlled: A1C 14.1, continue levemir 20u sc q12h and correction dose inslin      DVT Prophylaxis:  Mechanical     Disposition: I expect the patient to be discharged when work-up for klebsiella UTI and bacteremia is complete.    CODE STATUS:   Code Status and Medical Interventions:   Ordered at: 10/08/19 9808     Level Of Support Discussed With:    Patient     Code Status:    CPR     Medical Interventions (Level of Support Prior to Arrest):    Full         Electronically signed by Lucía Calvin MD, 10/16/19, 3:57 PM.

## 2019-10-16 NOTE — ANESTHESIA PROCEDURE NOTES
Airway  Urgency: elective    Airway not difficult    General Information and Staff    Patient location during procedure: OR    Indications and Patient Condition  Indications for airway management: airway protection    Preoxygenated: yes  Mask difficulty assessment: 1 - vent by mask    Final Airway Details  Final airway type: supraglottic airway      Successful airway: classic  Size 4    Number of attempts at approach: 1

## 2019-10-16 NOTE — ANESTHESIA POSTPROCEDURE EVALUATION
Patient: Kendrick Crystal    Procedure Summary     Date:  10/16/19 Room / Location:   [facility] 07 /  [facility]    Anesthesia Start:  1706 Anesthesia Stop:      Procedure:  CYSTOSCOPY (N/A Urethra) Diagnosis:      Surgeon:  Brad Gutierres MD Provider:  Kendrick Higuera MD    Anesthesia Type:  general ASA Status:  3          Anesthesia Type: general  Last vitals  BP   154/87 (10/16/19 1558)   Temp   96.9 °F (36.1 °C) (10/16/19 1558)   Pulse   67 (10/16/19 1558)   Resp   18 (10/16/19 1558)     SpO2   97 % (10/16/19 1558)     Post Anesthesia Care and Evaluation    Patient location during evaluation: PACU  Patient participation: waiting for patient participation  Pain score: 0  Airway patency: patent  Anesthetic complications: No anesthetic complications  PONV Status: none  Cardiovascular status: acceptable and hemodynamically stable  Respiratory status: acceptable, nasal cannula and spontaneous ventilation  Hydration status: acceptable    Comments: Transport to the ICU, report to RN, patient is stable, spontaneously ventilating

## 2019-10-16 NOTE — PROGRESS NOTES
Kendrick Crystal     LOS: 8 days     Subjective     Patient feels well.  No fever.  For cystoscopy later today.  No further pneumaturia.  Labs pending  Objective     Vital Signs  Vitals:    10/15/19 2036   BP: 140/82   Pulse: 77   Resp:    Temp:    SpO2:        I/O  Intake & Output (last day)       10/15 0701 - 10/16 0700    P.O. 480    I.V. (mL/kg) 1476 (13.2)    Total Intake(mL/kg) 1956 (17.5)    Urine (mL/kg/hr) 900 (0.3)    Stool 0    Total Output 900    Net +1056         Stool Unmeasured Occurrence 2 x          Physical Exam:  Abdomen soft and nontender.         Results Review:       WBC WBC   Date Value Ref Range Status   10/15/2019 15.01 (H) 3.40 - 10.80 10*3/mm3 Final   10/14/2019 11.97 (H) 3.40 - 10.80 10*3/mm3 Final      HGB Hemoglobin   Date Value Ref Range Status   10/15/2019 9.3 (L) 13.0 - 17.7 g/dL Final   10/14/2019 9.2 (L) 13.0 - 17.7 g/dL Final      HCT Hematocrit   Date Value Ref Range Status   10/15/2019 29.7 (L) 37.5 - 51.0 % Final   10/14/2019 29.0 (L) 37.5 - 51.0 % Final      PLT        Results from last 7 days   Lab Units 10/15/19  0356   PLATELETS 10*3/mm3 345            Sodium Sodium   Date Value Ref Range Status   10/15/2019 136 136 - 145 mmol/L Final   10/14/2019 135 (L) 136 - 145 mmol/L Final      Potassium Potassium   Date Value Ref Range Status   10/15/2019 3.7 3.5 - 5.2 mmol/L Final   10/14/2019 3.7 3.5 - 5.2 mmol/L Final      Chloride Chloride   Date Value Ref Range Status   10/15/2019 100 98 - 107 mmol/L Final   10/14/2019 100 98 - 107 mmol/L Final      Bicarbonate No results found for: PLASMABICARB   BUN BUN   Date Value Ref Range Status   10/15/2019 12 6 - 20 mg/dL Final   10/14/2019 10 6 - 20 mg/dL Final      Creatinine Creatinine   Date Value Ref Range Status   10/15/2019 0.53 (L) 0.76 - 1.27 mg/dL Final   10/14/2019 0.45 (L) 0.76 - 1.27 mg/dL Final      Calcium Calcium   Date Value Ref Range Status   10/15/2019 7.9 (L) 8.6 - 10.5 mg/dL Final   10/14/2019 7.7 (L) 8.6 - 10.5 mg/dL  Final      Magnesium No results found for: MG         Imaging Results (last 24 hours)     ** No results found for the last 24 hours. **          Assessment/Plan       Perinephric abscess    DUNLAP Cirrhosis    Essential hypertension    Non-compliance    Hyperlipemia    Type 2 diabetes mellitus with circulatory disorder and uncontrolled    S/P BKA (below knee amputation), left (CMS/HCC)    Gastroparesis    Acute UTI (urinary tract infection)    Bacteremia due to Klebsiella pneumoniae      Patient scheduled for cystoscopy today.  If a fistula is found will need to discuss options with medical team and patient.  Hopefully he will be able to be managed conservatively.    Mumtaz Payne MD  10/16/19  6:18 AM

## 2019-10-17 LAB
BASOPHILS # BLD AUTO: 0.07 10*3/MM3 (ref 0–0.2)
BASOPHILS NFR BLD AUTO: 0.5 % (ref 0–1.5)
DEPRECATED RDW RBC AUTO: 46.8 FL (ref 37–54)
EOSINOPHIL # BLD AUTO: 0.11 10*3/MM3 (ref 0–0.4)
EOSINOPHIL NFR BLD AUTO: 0.7 % (ref 0.3–6.2)
ERYTHROCYTE [DISTWIDTH] IN BLOOD BY AUTOMATED COUNT: 14.5 % (ref 12.3–15.4)
GLUCOSE BLDC GLUCOMTR-MCNC: 172 MG/DL (ref 70–130)
GLUCOSE BLDC GLUCOMTR-MCNC: 190 MG/DL (ref 70–130)
GLUCOSE BLDC GLUCOMTR-MCNC: 219 MG/DL (ref 70–130)
GLUCOSE BLDC GLUCOMTR-MCNC: 70 MG/DL (ref 70–130)
HCT VFR BLD AUTO: 28.8 % (ref 37.5–51)
HGB BLD-MCNC: 8.9 G/DL (ref 13–17.7)
IMM GRANULOCYTES # BLD AUTO: 0.12 10*3/MM3 (ref 0–0.05)
IMM GRANULOCYTES NFR BLD AUTO: 0.8 % (ref 0–0.5)
LYMPHOCYTES # BLD AUTO: 1.6 10*3/MM3 (ref 0.7–3.1)
LYMPHOCYTES NFR BLD AUTO: 10.3 % (ref 19.6–45.3)
MCH RBC QN AUTO: 27.2 PG (ref 26.6–33)
MCHC RBC AUTO-ENTMCNC: 30.9 G/DL (ref 31.5–35.7)
MCV RBC AUTO: 88.1 FL (ref 79–97)
MONOCYTES # BLD AUTO: 0.82 10*3/MM3 (ref 0.1–0.9)
MONOCYTES NFR BLD AUTO: 5.3 % (ref 5–12)
NEUTROPHILS # BLD AUTO: 12.76 10*3/MM3 (ref 1.7–7)
NEUTROPHILS NFR BLD AUTO: 82.4 % (ref 42.7–76)
NRBC BLD AUTO-RTO: 0 /100 WBC (ref 0–0.2)
PLATELET # BLD AUTO: 339 10*3/MM3 (ref 140–450)
PMV BLD AUTO: 11 FL (ref 6–12)
RBC # BLD AUTO: 3.27 10*6/MM3 (ref 4.14–5.8)
WBC NRBC COR # BLD: 15.48 10*3/MM3 (ref 3.4–10.8)

## 2019-10-17 PROCEDURE — 99232 SBSQ HOSP IP/OBS MODERATE 35: CPT | Performed by: NURSE PRACTITIONER

## 2019-10-17 PROCEDURE — 85025 COMPLETE CBC W/AUTO DIFF WBC: CPT | Performed by: INTERNAL MEDICINE

## 2019-10-17 PROCEDURE — 25010000002 CEFTRIAXONE PER 250 MG: Performed by: INTERNAL MEDICINE

## 2019-10-17 PROCEDURE — 63710000001 INSULIN LISPRO (HUMAN) PER 5 UNITS: Performed by: NURSE PRACTITIONER

## 2019-10-17 PROCEDURE — 63710000001 INSULIN DETEMIR PER 5 UNITS: Performed by: PHYSICIAN ASSISTANT

## 2019-10-17 PROCEDURE — 82962 GLUCOSE BLOOD TEST: CPT

## 2019-10-17 RX ORDER — NYSTATIN 100000 [USP'U]/G
POWDER TOPICAL EVERY 12 HOURS SCHEDULED
Status: DISCONTINUED | OUTPATIENT
Start: 2019-10-17 | End: 2019-10-23 | Stop reason: HOSPADM

## 2019-10-17 RX ADMIN — INSULIN LISPRO 4 UNITS: 100 INJECTION, SOLUTION INTRAVENOUS; SUBCUTANEOUS at 17:16

## 2019-10-17 RX ADMIN — RENAGEL 800 MG: 800 TABLET ORAL at 08:34

## 2019-10-17 RX ADMIN — INSULIN LISPRO 4 UNITS: 100 INJECTION, SOLUTION INTRAVENOUS; SUBCUTANEOUS at 17:15

## 2019-10-17 RX ADMIN — TERAZOSIN HYDROCHLORIDE ANHYDROUS 5 MG: 5 CAPSULE ORAL at 21:47

## 2019-10-17 RX ADMIN — METOPROLOL TARTRATE 100 MG: 100 TABLET ORAL at 08:34

## 2019-10-17 RX ADMIN — Medication 250 MG: at 21:47

## 2019-10-17 RX ADMIN — INSULIN DETEMIR 20 UNITS: 100 INJECTION, SOLUTION SUBCUTANEOUS at 08:35

## 2019-10-17 RX ADMIN — RENAGEL 800 MG: 800 TABLET ORAL at 13:20

## 2019-10-17 RX ADMIN — RENAGEL 800 MG: 800 TABLET ORAL at 17:15

## 2019-10-17 RX ADMIN — AMLODIPINE BESYLATE 10 MG: 10 TABLET ORAL at 08:34

## 2019-10-17 RX ADMIN — METOPROLOL TARTRATE 100 MG: 100 TABLET ORAL at 21:47

## 2019-10-17 RX ADMIN — NYSTATIN: 100000 POWDER TOPICAL at 21:48

## 2019-10-17 RX ADMIN — INSULIN DETEMIR 20 UNITS: 100 INJECTION, SOLUTION SUBCUTANEOUS at 21:46

## 2019-10-17 RX ADMIN — INSULIN LISPRO 7 UNITS: 100 INJECTION, SOLUTION INTRAVENOUS; SUBCUTANEOUS at 09:06

## 2019-10-17 RX ADMIN — DULOXETINE HYDROCHLORIDE 30 MG: 30 CAPSULE, DELAYED RELEASE ORAL at 08:34

## 2019-10-17 RX ADMIN — INSULIN LISPRO 2 UNITS: 100 INJECTION, SOLUTION INTRAVENOUS; SUBCUTANEOUS at 21:46

## 2019-10-17 RX ADMIN — INSULIN LISPRO 2 UNITS: 100 INJECTION, SOLUTION INTRAVENOUS; SUBCUTANEOUS at 08:36

## 2019-10-17 RX ADMIN — CEFTRIAXONE 2 G: 2 INJECTION, POWDER, FOR SOLUTION INTRAMUSCULAR; INTRAVENOUS at 15:11

## 2019-10-17 RX ADMIN — VANCOMYCIN HYDROCHLORIDE 125 MG: KIT at 06:21

## 2019-10-17 RX ADMIN — Medication 250 MG: at 08:33

## 2019-10-17 RX ADMIN — NYSTATIN: 100000 POWDER TOPICAL at 08:36

## 2019-10-17 RX ADMIN — SODIUM CHLORIDE 100 ML/HR: 9 INJECTION, SOLUTION INTRAVENOUS at 04:20

## 2019-10-17 NOTE — PROGRESS NOTES
Kendrick Crystal     LOS: 9 days     Subjective   Patient feels well but does have diarrhea.  On oral vancomycin.  Patient remains afebrile.  White blood cell count still 15.  Cystography did not show evidence of urethral or bladder fistula.    Objective     Vital Signs  Vitals:    10/16/19 2004   BP: 154/96   Pulse: 71   Resp:    Temp:    SpO2:        I/O  Intake & Output (last day)       10/16 0701 - 10/17 0700    I.V. (mL/kg) 1966 (17.6)    Total Intake(mL/kg) 1966 (17.6)    Urine (mL/kg/hr) 2150 (0.8)    Stool 0    Total Output 2150    Net -184         Stool Unmeasured Occurrence 4 x          Physical Exam:  Abdomen soft and nontender.         Results Review:       WBC WBC   Date Value Ref Range Status   10/17/2019 15.48 (H) 3.40 - 10.80 10*3/mm3 Final   10/16/2019 15.73 (H) 3.40 - 10.80 10*3/mm3 Final   10/15/2019 15.01 (H) 3.40 - 10.80 10*3/mm3 Final      HGB Hemoglobin   Date Value Ref Range Status   10/17/2019 8.9 (L) 13.0 - 17.7 g/dL Final   10/16/2019 8.8 (L) 13.0 - 17.7 g/dL Final   10/15/2019 9.3 (L) 13.0 - 17.7 g/dL Final      HCT Hematocrit   Date Value Ref Range Status   10/17/2019 28.8 (L) 37.5 - 51.0 % Final   10/16/2019 28.3 (L) 37.5 - 51.0 % Final   10/15/2019 29.7 (L) 37.5 - 51.0 % Final      PLT        Results from last 7 days   Lab Units 10/17/19  0545   PLATELETS 10*3/mm3 339            Sodium Sodium   Date Value Ref Range Status   10/16/2019 134 (L) 136 - 145 mmol/L Final   10/15/2019 136 136 - 145 mmol/L Final      Potassium Potassium   Date Value Ref Range Status   10/16/2019 3.6 3.5 - 5.2 mmol/L Final   10/15/2019 3.7 3.5 - 5.2 mmol/L Final      Chloride Chloride   Date Value Ref Range Status   10/16/2019 100 98 - 107 mmol/L Final   10/15/2019 100 98 - 107 mmol/L Final      Bicarbonate No results found for: PLASMABICARB   BUN BUN   Date Value Ref Range Status   10/16/2019 16 6 - 20 mg/dL Final   10/15/2019 12 6 - 20 mg/dL Final      Creatinine Creatinine   Date Value Ref Range Status    10/16/2019 0.52 (L) 0.76 - 1.27 mg/dL Final   10/15/2019 0.53 (L) 0.76 - 1.27 mg/dL Final      Calcium Calcium   Date Value Ref Range Status   10/16/2019 7.6 (L) 8.6 - 10.5 mg/dL Final   10/15/2019 7.9 (L) 8.6 - 10.5 mg/dL Final      Magnesium No results found for: MG         Imaging Results (last 24 hours)     ** No results found for the last 24 hours. **          Assessment/Plan       Perinephric abscess    DUNLAP Cirrhosis    Essential hypertension    Non-compliance    Hyperlipemia    Type 2 diabetes mellitus with circulatory disorder and uncontrolled    S/P BKA (below knee amputation), left (CMS/HCC)    Gastroparesis    Acute UTI (urinary tract infection)    Bacteremia due to Klebsiella pneumoniae      No evidence of urethral or bladder fistula on recent cystography.  Perineal abscess nearly completely resolved.  Is now status post drainage of perinephric abscess.  White blood cell count remains at 15.  Is having diarrhea but on oral vancomycin.  Will leave the decision for repeat imaging to medicine and infectious disease.  Will be available if new colorectal tissue develops.      Mumtaz Payne MD  10/17/19  6:26 AM

## 2019-10-17 NOTE — PLAN OF CARE
Problem: Patient Care Overview  Goal: Plan of Care Review  Outcome: Ongoing (interventions implemented as appropriate)   10/17/19 0351   Plan of Care Review   Progress no change   OTHER   Outcome Summary VSS. no complaints of pain. wife at bedside. Up to c with lift. will continue to monitor   Coping/Psychosocial   Plan of Care Reviewed With patient       Problem: Fall Risk (Adult)  Goal: Absence of Fall  Outcome: Ongoing (interventions implemented as appropriate)   10/17/19 0351   Fall Risk (Adult)   Absence of Fall making progress toward outcome       Problem: Skin Injury Risk (Adult)  Goal: Skin Health and Integrity  Outcome: Ongoing (interventions implemented as appropriate)   10/17/19 0351   Skin Injury Risk (Adult)   Skin Health and Integrity making progress toward outcome       Problem: Pain, Acute (Adult)  Goal: Acceptable Pain Control/Comfort Level  Outcome: Ongoing (interventions implemented as appropriate)   10/17/19 0351   Pain, Acute (Adult)   Acceptable Pain Control/Comfort Level making progress toward outcome       Problem: Infection, Risk/Actual (Adult)  Goal: Infection Prevention/Resolution  Outcome: Ongoing (interventions implemented as appropriate)   10/17/19 0351   Infection, Risk/Actual (Adult)   Infection Prevention/Resolution making progress toward outcome

## 2019-10-17 NOTE — PLAN OF CARE
Problem: Patient Care Overview  Goal: Plan of Care Review  Outcome: Ongoing (interventions implemented as appropriate)   10/17/19 0800 10/17/19 1650   Plan of Care Review   Progress --  improving   OTHER   Outcome Summary --  pt transfered to bsc w/assist x2, pt transferred from bsc to bed w/assist x3.    Coping/Psychosocial   Plan of Care Reviewed With patient;spouse --      Goal: Discharge Needs Assessment  Outcome: Ongoing (interventions implemented as appropriate)   10/09/19 1055 10/17/19 1650   Discharge Needs Assessment   Readmission Within the Last 30 Days --  no previous admission in last 30 days;lack of support   Concerns Comments --  pt is non compliant   Patient/Family Anticipates Transition to --  home with family   Patient/Family Anticipated Services at Transition --     Transportation Concerns --  car, none   Transportation Anticipated --  family or friend will provide   Equipment Needed After Discharge none --    Offered/Gave Vendor List --  no   Current Discharge Risk --  chronically ill;physical impairment   Disability   Equipment Currently Used at Home bath bench;wheelchair;prosthesis;wheelchair, motorized;commode;grab bar --        Problem: Fall Risk (Adult)  Goal: Absence of Fall  Outcome: Ongoing (interventions implemented as appropriate)   10/17/19 1650   Fall Risk (Adult)   Absence of Fall making progress toward outcome       Problem: Skin Injury Risk (Adult)  Goal: Skin Health and Integrity  Outcome: Ongoing (interventions implemented as appropriate)   10/17/19 1650   Skin Injury Risk (Adult)   Skin Health and Integrity making progress toward outcome       Problem: Pain, Acute (Adult)  Goal: Acceptable Pain Control/Comfort Level  Outcome: Ongoing (interventions implemented as appropriate)   10/17/19 1650   Pain, Acute (Adult)   Acceptable Pain Control/Comfort Level achieves outcome       Problem: Infection, Risk/Actual (Adult)  Goal: Identify Related Risk Factors and Signs and  Symptoms  Outcome: Ongoing (interventions implemented as appropriate)   10/17/19 1650   Infection, Risk/Actual (Adult)   Related Risk Factors (Infection, Risk/Actual) chronic illness/condition;exposure to microbes;medication effects;skin integrity impairment;treatment plan   Signs and Symptoms (Infection, Risk/Actual) blood glucose changes;edema;lab value changes     Goal: Infection Prevention/Resolution  Outcome: Ongoing (interventions implemented as appropriate)   10/17/19 1650   Infection, Risk/Actual (Adult)   Infection Prevention/Resolution making progress toward outcome

## 2019-10-17 NOTE — PROGRESS NOTES
[facility] Progress Note        Antibiotics:  Anti-Infectives (From admission, onward)    Ordered     Dose/Rate Route Frequency Start Stop    10/15/19 1127  vancomycin oral solution reconstituted 125 mg     Ordering Provider:  Usman Dong MD    125 mg Oral Every 6 Hours Scheduled 10/15/19 1215 10/29/19 1159    10/12/19 1339  cefTRIAXone (ROCEPHIN) 2 g/100 mL 0.9% NS VTB (EVENS)     Ordering Provider:  Ayad Blount MD    2 g  over 30 Minutes Intravenous Every 24 Hours 10/12/19 1500 10/22/19 1459    10/09/19 1136  meropenem (MERREM) 1 g/100 mL 0.9% NS VTB (mbp)     Ordering Provider:  Usman Dong MD    1 g  over 30 Minutes Intravenous Once 10/09/19 1230 10/09/19 1251    10/08/19 2338  piperacillin-tazobactam (ZOSYN) 3.375 g in iso-osmotic dextrose 50 ml (premix)     Ordering Provider:  Yasmany Martinez PA-C    3.375 g  over 30 Minutes Intravenous Once 10/08/19 2340 10/09/19 0016          CC: fatigue    HPI:  Patient is a 49 y.o.  Yr old male with history of poorly contolled T2DM, DUNLAP/liver cirrhosis, HTN, PVD/Left BKA, and multiple skin abscesses/MRSA who presented to [facility] ED with right shin wound infection summer 2018.  He was unable to get to see Dr. Martinez as his father passed away unexpectedly on 18 and he had to wait until after the .  The wound worsened becoming gangrenous and he came to [facility] ED in 2018.  [facility]  for a severe penis/scrotum infection requiring surgical debridement in summer 2018.  He received antibiotics regarding his right leg infection, generally improved over the remainder of summer 2018 but that area had not completely healed. He was admitted 2019 with acute left lower abdominal wall redness/swelling and pain, spontaneous drainage associated with fever/chills and uncontrolled blood sugars.   I&D requiring wide debridement , severe/deep infection and transferred to  [facility] on May 3 with IV Zosyn/oral doxycycline. Cultures had lactobacillus and mixed gram-positive skin sherly including alpha strep, staph epidermidis and corynebacterium. Readmitted to [facility] May 18, 2019, increasing generalized edema ultimately transitioned to oral doxycycline and healed.     He presented to the emergency room July 30, 2019 with acute rectal pain that had begun 7/27; this is associated with constipation for days, chills and nausea/dry heaving.  White blood cell count elevated, high hemoglobin A1c over 13, urinalysis with hematuria/pyuria and CT scan with 3 x 3 cm fluid collection anterior to the distal rectum and per discussion with Dr. Akbar/Jennifer, this is not in the prostate.  Colorectal surgery/urology saw him for consideration of surgical options/timing/threshold, etc..     8/2/19 I&Dper Dr Payne perirectal abscess; patient reports that he had instrumented his rectum prior to hospitalization with enema     8/3/19 urinary retention overnight requiring in/out catheterization per patient.  Creat climb     8/4/19 further creat climb; childs placed and lisinopril stopped and d/w Dr Rubio;  ?obstruction initially associated with retention postop (1200 out with I/O cath postop per nursing) -v- contrast from CT -v- lisinopril/medication/vancomycin -v- relative hypotension -v- likely combination of factors with ATN; nephrology following; vancomycin trough high and vancomycin stopped empirically 8/3 although high trough potentially accumulation as a consequence of diminishing renal function associated with retention/contrast/pressure rather than cause.  Nonetheless, avoid for now; creatinine trending down.        8/7 in retrospect he remembers air in his urine at admit which has raised concern for possible fistula from urinary tract;  No fecaluria and culture from abscess is fecal sherly;  ?rectal-urethral fistula;  Dr Payne aware     Culture with E. coli/Klebsiella species and  "creatinine generally trended down, transitioned to oral antibiotics at discharge.     Readmitted August 17, 2019 with nausea/vomiting/dry heaves for 3 days.  Wakler catheter was placed and CT scan of the abdomen showed:      \"Previously seen fluid collection identified inferior to the prostate gland is more increased in density on today's examination. There is wall thickening again seen throughout the bladder. Findings again are concerning for cystitis.\" per radiology     He was transitioned to oral omnicef/topical antifungal on approx 8/23/19; Noncompliant with f/u in our office     READMITTED 10/8/19 RLQ abd pain, urinary retention, nausea, dysuria and generalized fatigue     UTI by U/A, subsequent blood cultures positive for GNR and initial PCR with kleb sp, no carbapenem resistance by initial PCR per my d/w micro;  Abnormal CT abd with right perinephric fluid collection, advanced cirrhosis, urinary bladder thickening.  10/9 Aspirate of perinephric fluid collection consistent with abscess/gram-negative lon and white blood cells     10/16/19 cytoscopy with bullous change per Dr Gutierres but no fistula per him    10/17/19 denies new pain;  intermittent diarrhea; He currently denies abd pain; no overt hematuria or pyuria;  He denied any  fecaluria prior to admit to me.       No headache photophobia or neck stiffness.  No shortness of breath cough or hemoptysis.  No diarrhea.  No hematochezia melena or hematemesis.  No skin rash.     ROS:      10/17/19 No f/c/s. No n/v/d. No rash. No new ADR to Abx.     Constitutional-- No Fever, chills or sweats.  Appetite diminished with fatigue  Heent-- No new vision, hearing or throat complaints.  No epistaxis or oral sores.  Denies odynophagia or dysphagia.  No flashers, floaters or eye pain. No odynophagia or dysphagia. No headache, photophobia or neck stiffness.  CV-- No chest pain, palpitation or syncope  Resp-- No SOB/cough/Hemoptysis  GI-  No hematochezia, melena, or " "hematemesis. Denies jaundice or chronic liver disease.  -- No dysuria, hematuria, or flank pain.  Denies hesitancy, urgency or flank pain.  Lymph- no swollen lymph nodes in neck/axilla or groin.   Heme- No active bruising or bleeding; no Hx of DVT or PE.  MS-- no swelling or pain in the bones or joints of arms/legs.  No new back pain.  Neuro-- No acute focal weakness or numbness in the arms or legs.  No seizures.     Full 12 point review of systems reviewed and negative otherwise for acute complaints, except for above      PE:   /90 (BP Location: Left arm, Patient Position: Lying)   Pulse 80   Temp 98 °F (36.7 °C) (Oral)   Resp 18   Ht 190.5 cm (75\")   Wt 112 kg (246 lb)   SpO2 100%   BMI 30.75 kg/m²     GENERAL: awake, in no acute distress.   HEENT: Normocephalic, atraumatic.  PERRL. EOMI. No conjunctival injection. No icterus. Oropharynx clear without evidence of thrush or exudate. No evidence of peridontal disease.    NECK: Supple without nuchal rigidity. No mass.  LYMPH: No cervical, axillary or inguinal lymphadenopathy.  HEART: RRR; No murmur, rubs, gallops.   LUNGS: Diminished at bases bilaterally without wheezing, rales, rhonchi. Normal respiratory effort. Nonlabored. No dullness.  ABDOMEN: Soft, nontender, nondistended. Positive bowel sounds. No rebound or guarding. NO mass or HSM.  EXT:  No cyanosis, clubbing or edema. No cord.  : no new findings  MSK: FROM without joint effusions noted arms/legs.    SKIN: Warm and dry without cutaneous eruptions on Inspection/palpation.    NEURO: awake     Amputee status noted with left BKA     No CVA tenderness     No peripheral stigmata/phenomena of endocarditis    Laboratory Data    Results from last 7 days   Lab Units 10/17/19  0545 10/16/19  0625 10/15/19  0356   WBC 10*3/mm3 15.48* 15.73* 15.01*   HEMOGLOBIN g/dL 8.9* 8.8* 9.3*   HEMATOCRIT % 28.8* 28.3* 29.7*   PLATELETS 10*3/mm3 339 348 345     Results from last 7 days   Lab Units 10/16/19  0625 "   SODIUM mmol/L 134*   POTASSIUM mmol/L 3.6   CHLORIDE mmol/L 100   CO2 mmol/L 25.0   BUN mg/dL 16   CREATININE mg/dL 0.52*   GLUCOSE mg/dL 170*   CALCIUM mg/dL 7.6*     Results from last 7 days   Lab Units 10/16/19  0625   ALK PHOS U/L 261*   BILIRUBIN mg/dL <0.2*   ALT (SGPT) U/L 16   AST (SGOT) U/L 17               Estimated Creatinine Clearance: 227 mL/min (A) (by C-G formula based on SCr of 0.52 mg/dL (L)).      Microbiology:      Radiology:  Imaging Results (last 72 hours)     Procedure Component Value Units Date/Time    CT Guided Abscess Drain Retroperitoneal [[id number]] Collected:  10/09/19 1611     Updated:  10/09/19 1715    Narrative:       EXAMINATION: CT GUIDED ABSCESS DRAIN RETROPERITONEAL- 10/09/2019     INDICATION: N15.1-Renal and perinephric abscess; A41.9-Sepsis,  unspecified organism; E86.0-Dehydration; E11.65-Type 2 diabetes mellitus  with hyperglycemia; I10-Essential (primary) hypertension;  Z86.14-Personal history of Methicillin resistant Staphylococcus aureus  infection; K74.60-Unspecified cirrhosis of liver; fluid collection     TECHNIQUE: CT-guided drainage of a perinephric fluid collection     The radiation dose reduction device was turned on for each scan per the  ALARA (As Low as Reasonably Achievable) protocol.     COMPARISON: NONE     FINDINGS: Patient referred for CT-guided drainage of a perinephric fluid  collection. Procedure and risks were explained to the patient. Informed  consent was obtained and signed. Patient placed in the left lateral  position upon the table. The right flank was prepped and draped in a  sterile fashion. 1% lidocaine used as a local anesthetic. Under CT  fluoroscopic guidance an 18-gauge 15 cm Chiba needle was advanced into  the perinephric fluid collection of approximately 10 ml of a reddish  fluid was removed with some debris within the fluid. There is a striated  delayed nephrogram seen of the right kidney suggesting possible  pyelonephritis with  surrounding stranding of the perinephric fat.  Myelolipoma seen on the adrenal gland on the left. Chiba needle was  removed and no immediate complication occurred. Slight decrease seen in  size of the perinephric fluid collection status post drainage.       Impression:       CT-guided drainage of a perinephric fluid collection with no  immediate complication.     D:  10/09/2019  E:  10/09/2019     This report was finalized on 10/9/2019 5:12 PM by Dr. Nella Enriquez MD.       CT Abdomen Pelvis With Contrast [[id number]] Collected:  10/08/19 2202     Updated:  10/08/19 2222    Narrative:       CT ABDOMEN AND PELVIS WITH CONTRAST, 10/8/2019    HISTORY:  49-year-old male in the ED complaining of 2 day history right side abdomen pain, nausea, vomiting and generalized weakness. History of hepatic cirrhosis (DUNLAP). History of diabetes with left BKA and multiple abdominal wall abscesses and lower extremity  cellulitis in 2018.    TECHNIQUE:  CT imaging of the abdomen and pelvis with IV contrast. Radiation dose reduction techniques included automated exposure control. Radiation audit for CT and nuclear cardiology exams in the last 12 months: 7.    COMPARISON:  *  CT abdomen/pelvis, 8/18/2019.    ABDOMEN FINDINGS:  Images show a right lower perinephric fluid collection that is either subcapsular or pericapsular. This extends along the lower kidney for a length of about 8.5 cm and has a maximum thickness of 2.5 cm. The fluid is mixed attenuation and may be  hemorrhagic. Correlate for any recent history of blunt trauma to the flank. Renal parenchyma shows no disruption or abnormal enhancement. There is no fracture of overlying right lower posterior ribs or transverse spinous processes. Given the history,  perinephric abscess is possible but is significantly less likely given normal renal parenchymal enhancement. No evidence of urinary obstruction. No visible nephrolithiasis.    Advanced hepatic cirrhosis. Mild splenomegaly.  No suspicious liver lesion. No upper abdominal ascites. Hepatic vasculature appears patent. No gallbladder distention or bile duct dilatation. Negative pancreas.    Benign left adrenal myelolipoma measuring about 2.9 cm. Normal caliber abdominal aorta. Small bowel and colon are normal in caliber and appearance, as imaged. The appendix is normal. No gastric distention or distal esophageal dilatation.    PELVIS FINDINGS:  Air within the urinary bladder, likely iatrogenic. There is at least mild diffuse bladder wall thickening. Prostate and rectum are unremarkable. No inguinal hernia.    Lung base images show multiple benign calcified granulomas.      Impression:       1.  Small mixed attenuation right perinephric fluid collection along the inferior capsule, not present on the prior exam. Perinephric hematoma is a consideration. Correlate clinically for any history of blunt flank trauma. While abscess is possible, this  is unlikely as right renal parenchyma enhances normally with no evidence of pyelonephritis. Laboratory correlation recommended.  2.  Advanced hepatic cirrhosis. Splenomegaly.  3.  Urinary bladder wall thickening. Gas within the bladder is most likely iatrogenic.  4.  Benign left adrenal myelolipoma.    Signer Name: Fadi Healy MD   Signed: 10/8/2019 10:02 PM   Workstation Name: [email or web address]    [facility]    XR Chest PA & Lateral [[id number]] Collected:  10/08/19 2210     Updated:  10/08/19 2212    Narrative:       CHEST X-RAY, 10/8/2019      HISTORY:    49-year-old male in the ED with reported diabetic ketoacidosis.      TECHNIQUE:  AP portable upright chest series.      FINDINGS:  Heart size and pulmonary vascularity are normal. The lungs are expanded and clear. No visible pulmonary infiltrate or pleural effusion. Lower lung benign calcified granulomas.      Impression:       Negative chest.    Signer Name: Fadi Healy MD   Signed: 10/8/2019 10:10 PM   Workstation  Name: [email or web address]    [facility] [place]            Impression:      --Acute Kleb septicemia/sepsis, likely urinary source/pyelnoephritis associated with urinary retention and  with abnormal CT scan with perinephric collection/abscess and Kleb on aspirate;  IV rocephin ongoing; urology to determine any timing/option or threshold for further intervention or functional/anatomic assessment.    --Acute perinephric abscess as above;  ?repeat imaging to assess for progress -v- other sequelae    --acute loose stools/intermittent diarrhea; with Hx CDiff per him/family;  Empiric oral vancomycin added with high risk for relapse     --Hx perirectal abscess ; Colorectal surgery, Dr. Payne previously saw and is following.  Drainage as above on August 2 with mxed Fecal sherly in culture; CT scans  with no mention of abscess on 8/18 study;  Dr Payne  following given urology conern for fistula and to help determine any timing/option or threshold for further GI/colorectal work-up     --History urinary retention.  urology following     --Hx ARF in August 2019;  ?obstruction initially associated with retention postop (1200 out with I/O cath postop per nursing) -v- contrast from CT -v- lisinopril/medication/vancomycin -v- relative hypotension -v- likely combination of factors with ATN;  vancomycin trough high and vancomycin stopped empirically 8/3 although high trough potentially accumulation as a consequence of diminishing renal function associated with retention/contrast/pressure rather than cause.  Nonetheless, avoid for now; likely further acute kidney injury at admission August 17 associated with dehydration/prerenal/ATN etiology     --History MRSA;  Not in current culture     --Diabetes mellitus type 2, uncontrolled.  He reports the reason for this is forgetfulness     --History Johnston and chronic liver disease/cirrhosis  per past notes     --Left BKA     --Peripheral vascular disease     --medical  noncompliance    PLAN:     --IV rocephin/oral vancomycin      --Check/review labs cultures and scans     --Discussed with microbiology     --History per nursing staff     --Discussed with Dr Payne     --Discussed with family, partial history per them     --Highly complex set of issues with high risk for further serious morbidity and other serious sequela     --Colorectal surgery and urology to follow    --I anticipate repeat CT abd to assess perinephric abscess prior to discharge;  D/w Dr Caro Dong MD  10/17/2019    10/17/19 09:38 ROS corrected to reflect ROS for 10/17 note as follows:      10/17/19 No f/c/s. No n/v. No rash. No new ADR to Abx.     Constitutional-- No Fever, chills or sweats.  Appetite diminished with fatigue  Heent-- No new vision, hearing or throat complaints.  No epistaxis or oral sores.  Denies odynophagia or dysphagia.  No flashers, floaters or eye pain. No odynophagia or dysphagia. No headache, photophobia or neck stiffness.  CV-- No chest pain, palpitation or syncope  Resp-- No SOB/cough/Hemoptysis  GI-  No hematochezia, melena, or hematemesis. Denies jaundice or chronic liver disease.  -- No dysuria, hematuria, or flank pain.  Denies hesitancy, urgency or flank pain.  Lymph- no swollen lymph nodes in neck/axilla or groin.   Heme- No active bruising or bleeding; no Hx of DVT or PE.  MS-- no swelling or pain in the bones or joints of arms/legs.  No new back pain.  Neuro-- No acute focal weakness or numbness in the arms or legs.  No seizures.     Full 12 point review of systems reviewed and negative otherwise for acute complaints, except for above

## 2019-10-17 NOTE — PROGRESS NOTES
[facility] Medicine Services  PROGRESS NOTE    Patient Name: Kendrick Crystal  : 1970  MRN: [id number]    Date of Admission: 10/8/2019  Primary Care Physician: No primary care provider on file.    Subjective   Subjective     CC:  Follow up for klebsiella septicemia    HPI:  Seen resting back in bed in NAD.  Wife at bs.  Pt very irritable because he states the staff are making him try and get up to go to the bathroom.  Flat affect.  States he feels weak and having some loose stools.  Staff report stools getting a little more formed. No n/v, cp or soa.  Tolerating diet.  A little decreased appetite now on po vanc.  Needs rehab likely.  Had terminated PT/OT prior, reluctantly agrees to let them re-eval today.     Review of Systems  Gen- No fevers, chills  CV- No chest pain, palpitations  Resp- No cough, dyspnea  GI- mild intermittent nausea, no vomiting, no abd pain    All other systems reviewed and negative.     Objective   Objective     Vital Signs:   Temp:  [96.9 °F (36.1 °C)-98 °F (36.7 °C)] 98 °F (36.7 °C)  Heart Rate:  [60-80] 80  Resp:  [16-18] 18  BP: (102-155)/(65-96) 155/90        Physical Exam:  Constitutional: No acute distress, awake, alert.  Lying back in bed. Wife at bs  HENT: NCAT, mucous membranes moist  Respiratory: Clear to auscultation bilaterally A/P but slightly decreased at bs, respiratory effort normal on RA   Cardiovascular: RRR, no murmurs, rubs, or gallops, palpable pedal pulses bilaterally  Gastrointestinal: Positive bowel sounds, soft, nontender, nondistended.  Obese   Musculoskeletal: No bilateral ankle edema.  OLGUIN spontaneously   Psychiatric: Flat affect, cooperative and calm but irritated. As above.    Neurologic: Oriented x 3, strength symmetric in all extremities but slightly generally weak, Cranial Nerves grossly intact to confrontation, speech clear and appropriate. Follows commands   Skin: No rashes.  Rt UE PICC in place c/d/i.        Results  Reviewed:    Results from last 7 days   Lab Units 10/17/19  0545 10/16/19  0625 10/15/19  0356   WBC 10*3/mm3 15.48* 15.73* 15.01*   HEMOGLOBIN g/dL 8.9* 8.8* 9.3*   HEMATOCRIT % 28.8* 28.3* 29.7*   PLATELETS 10*3/mm3 339 348 345     Results from last 7 days   Lab Units 10/16/19  0625 10/15/19  0356 10/14/19  0412   SODIUM mmol/L 134* 136 135*   POTASSIUM mmol/L 3.6 3.7 3.7   CHLORIDE mmol/L 100 100 100   CO2 mmol/L 25.0 26.0 25.0   BUN mg/dL 16 12 10   CREATININE mg/dL 0.52* 0.53* 0.45*   GLUCOSE mg/dL 170* 91 117*   CALCIUM mg/dL 7.6* 7.9* 7.7*   ALT (SGPT) U/L 16  --   --    AST (SGOT) U/L 17  --   --      Estimated Creatinine Clearance: 227 mL/min (A) (by C-G formula based on SCr of 0.52 mg/dL (L)).    Microbiology Results Abnormal     Procedure Component Value - Date/Time    Fungus Culture - Body Fluid, Peritoneum [[id number]] Collected:  10/09/19 1508    Lab Status:  Preliminary result Specimen:  Body Fluid from Peritoneum Updated:  10/16/19 1704     Fungus Culture No fungus isolated at 1 week    Anaerobic Culture - Body Fluid, Peritoneum [[id number]] Collected:  10/09/19 1508    Lab Status:  Final result Specimen:  Body Fluid from Peritoneum Updated:  10/14/19 0935     Anaerobic Culture No anaerobes isolated at 5 days    Urine Culture - Urine, Urine, Clean Catch [[id number]]  (Abnormal)  (Susceptibility) Collected:  10/08/19 2719    Lab Status:  Final result Specimen:  Urine, Clean Catch Updated:  10/12/19 0931     Urine Culture >100,000 CFU/mL Klebsiella pneumoniae ssp pneumoniae      >100,000 CFU/mL Escherichia coli    Susceptibility      Klebsiella pneumoniae ssp pneumoniae     EYAD     Ampicillin Resistant     Ampicillin + Sulbactam Susceptible     Cefazolin Susceptible     Cefepime Susceptible     Ceftazidime Susceptible     Ceftriaxone Susceptible     Gentamicin Susceptible     Levofloxacin Susceptible     Nitrofurantoin Intermediate     Piperacillin + Tazobactam Susceptible     Tetracycline Susceptible      Trimethoprim + Sulfamethoxazole Susceptible                Susceptibility      Escherichia coli     EYAD     Ampicillin Resistant     Ampicillin + Sulbactam Intermediate     Cefazolin Susceptible     Cefepime Susceptible     Ceftazidime Susceptible     Ceftriaxone Susceptible     Gentamicin Susceptible     Levofloxacin Resistant     Nitrofurantoin Susceptible     Piperacillin + Tazobactam Susceptible     Tetracycline Susceptible     Trimethoprim + Sulfamethoxazole Susceptible                    Body Fluid Culture - Body Fluid, Peritoneum [[id number]]  (Abnormal)  (Susceptibility) Collected:  10/09/19 1508    Lab Status:  Final result Specimen:  Body Fluid from Peritoneum Updated:  10/11/19 0621     Body Fluid Culture Moderate growth (3+) Klebsiella pneumoniae ssp pneumoniae     Gram Stain Many (4+) WBCs per low power field      Moderate (3+) Gram negative bacilli    Susceptibility      Klebsiella pneumoniae ssp pneumoniae     EYAD     Ampicillin Resistant     Ampicillin + Sulbactam Susceptible     Cefazolin Susceptible     Cefepime Susceptible     Ceftazidime Susceptible     Ceftriaxone Susceptible     Gentamicin Susceptible     Levofloxacin Susceptible     Piperacillin + Tazobactam Susceptible     Trimethoprim + Sulfamethoxazole Susceptible                    Blood Culture - Blood, Arm, Right [[id number]]  (Abnormal) Collected:  10/08/19 2012    Lab Status:  Final result Specimen:  Blood from Arm, Right Updated:  10/10/19 2122     Blood Culture Klebsiella pneumoniae ssp pneumoniae     Isolated from Aerobic and Anaerobic Bottles     Gram Stain Aerobic Bottle Gram negative bacilli      Anaerobic Bottle Gram negative bacilli    Narrative:       Refer to blood culture collected 10/8/2019 at 2000 for MICs.    Blood Culture - Blood, Arm, Left [[id number]]  (Abnormal)  (Susceptibility) Collected:  10/08/19 2000    Lab Status:  Final result Specimen:  Blood from Arm, Left Updated:  10/10/19 2122     Blood Culture Klebsiella  pneumoniae ssp pneumoniae     Isolated from Aerobic and Anaerobic Bottles     Gram Stain Anaerobic Bottle Gram negative bacilli      Aerobic Bottle Gram negative bacilli    Susceptibility      Klebsiella pneumoniae ssp pneumoniae     EYAD     Ampicillin Resistant     Ampicillin + Sulbactam Susceptible     Cefazolin Susceptible     Cefepime Susceptible     Ceftazidime Susceptible     Ceftriaxone Susceptible     Gentamicin Susceptible     Levofloxacin Susceptible     Piperacillin + Tazobactam Susceptible     Trimethoprim + Sulfamethoxazole Susceptible                    Blood Culture ID, PCR - Blood, Arm, Left [[id number]]  (Abnormal) Collected:  10/08/19 2000    Lab Status:  Final result Specimen:  Blood from Arm, Left Updated:  10/09/19 1038     BCID, PCR Klebsiella pneumoniae. Identification by BCID PCR.          Imaging Results (last 24 hours)     ** No results found for the last 24 hours. **          Results for orders placed during the hospital encounter of 05/18/19   Adult Transthoracic Echo Complete W/ Cont if Necessary Per Protocol    Narrative · Estimated EF = 60%.  · Mild mitral valve regurgitation is present  · Mild tricuspid valve regurgitation is present.  · Calculated right ventricular systolic pressure from tricuspid   regurgitation is 29 mmHg.          I have reviewed the medications:  Scheduled Meds:    amLODIPine 10 mg Oral Q24H   ceftriaxone 2 g Intravenous Q24H   DULoxetine 30 mg Oral Daily   insulin detemir 20 Units Subcutaneous Q12H   insulin lispro 0-9 Units Subcutaneous 4x Daily With Meals & Nightly   insulin lispro 4 Units Subcutaneous TID With Meals   metoprolol tartrate 100 mg Oral Q12H   nystatin  Topical Q12H   saccharomyces boulardii 250 mg Oral BID   sevelamer 800 mg Oral TID With Meals   sodium chloride 10 mL Intravenous Q12H   sodium chloride 10 mL Intravenous Q12H   terazosin 5 mg Oral Nightly   vancomycin 125 mg Oral Q6H     Continuous Infusions:    lactated ringers 9 mL/hr Last Rate:  9 mL/hr (10/16/19 1609)   sodium chloride 100 mL/hr Last Rate: 100 mL/hr (10/17/19 0420)     PRN Meds:.•  acetaminophen **OR** acetaminophen **OR** acetaminophen  •  dextrose  •  dextrose  •  glucagon (human recombinant)  •  HYDROcodone-acetaminophen  •  LORazepam  •  melatonin  •  ondansetron  •  potassium chloride **OR** potassium chloride **OR** potassium chloride  •  sodium chloride  •  sodium chloride      Assessment/Plan   Assessment / Plan     Active Hospital Problems    Diagnosis  POA   • **Perinephric abscess [N15.1]  Yes   • Bacteremia due to Klebsiella pneumoniae [R78.81]  Unknown   • Acute UTI (urinary tract infection) [N39.0]  Yes   • Gastroparesis [K31.84]  Yes   • S/P BKA (below knee amputation), left (CMS/HCC) [Z89.512]  Not Applicable   • Type 2 diabetes mellitus with circulatory disorder and uncontrolled [E11.59]  Yes   • DUNLAP Cirrhosis [K74.60]  Yes   • Essential hypertension [I10]  Yes   • Hyperlipemia [E78.5]  Yes   • Non-compliance [Z91.14]  Not Applicable      Resolved Hospital Problems   No resolved problems to display.        Brief Hospital Course to date:  Kendrick Crystal is a 49 y.o. male  48 yo gentleman new patient for me today, hx of T2DM, hypertension, hx of multiple skin/abscesses/MRSA infection, DUNLAP cirrhosis, PVD s/p L BKA, and  presented with sepsis 2/2 renal and perinephric abscess s/p IR guided drainage of abscess culture growing Klebsiella pneumoniae currently on ceftriaxone.    Sepsis 2/2 perinephric abscess and UTI, Klebsiella pneuomniae bacteremia  -cultures from peritoneum and blood cultures from 10/10 growing Klebsiella resistant to ampicillin   -ID following, on ceftriaxone (end date 10/22/2019)  -PICC placed 10/15  -urology consulted, pt s/p cystoscopy 10/16 to evaluate for hematuria and assess for poss fistula from bowel to bladder ?.     Findings: bullous edema on floor of bladder likely from his indwelling childs.  Unable to cannulate a fistula.    -will continue  medical management for now      Hx of C diff with intermittent loose stool: continue vancomycin (end date 10/29/2019)  --per staff, stool slightly thickening.  Monitor.    --am labs, mag    Hx of perirectal abscess: CT abd pelvis on 10/8 showed small right perinephric fluid collection along inferior capsule, advanced cirrhosis, Dr Payne not recommending surgery at this time, will continue medical management   --ID/CRS following  Per ID recs, will megan a CT abd/pelvis to re-eval closer to dc date.  CK CT prior to dc home.    --WBC holding in 15 range.  Monitor  --megan labs in am     HTN: continue lopressor 100 mg q12h and amlodipine 10 mg q24h    T2DM poorly controlled: A1C 14.1, continue levemir 20u sc q12h, mealtime 7 units tid ach and correction dose inslin  --running low normal generally.  Will slightly reduce mealtime today to 4 units TID with hold parameters.      Weakness  --reconsult PT/OT eval   --pt had prior dismissed them.  Long discussion today with pt/wife on need for up oob, attempt stand/pivot, pt agrees reluctantly to see PT again.        DVT Prophylaxis:  Mechanical     Disposition: I expect the patient to be discharged when work-up for klebsiella UTI and bacteremia is complete.    CODE STATUS:   Code Status and Medical Interventions:   Ordered at: 10/08/19 6507     Level Of Support Discussed With:    Patient     Code Status:    CPR     Medical Interventions (Level of Support Prior to Arrest):    Full         Electronically signed by CHINTAN Gallagher, 10/17/19, 2:19 PM.

## 2019-10-18 ENCOUNTER — APPOINTMENT (OUTPATIENT)
Dept: CT IMAGING | Facility: HOSPITAL | Age: 49
End: 2019-10-18

## 2019-10-18 LAB
ALBUMIN SERPL-MCNC: 2.2 G/DL (ref 3.5–5.2)
ALBUMIN/GLOB SERPL: 0.6 G/DL
ALP SERPL-CCNC: 230 U/L (ref 39–117)
ALT SERPL W P-5'-P-CCNC: 16 U/L (ref 1–41)
ANION GAP SERPL CALCULATED.3IONS-SCNC: 7 MMOL/L (ref 5–15)
AST SERPL-CCNC: 20 U/L (ref 1–40)
BASOPHILS # BLD AUTO: 0.08 10*3/MM3 (ref 0–0.2)
BASOPHILS NFR BLD AUTO: 0.6 % (ref 0–1.5)
BILIRUB SERPL-MCNC: <0.2 MG/DL (ref 0.2–1.2)
BUN BLD-MCNC: 14 MG/DL (ref 6–20)
BUN/CREAT SERPL: 27.5 (ref 7–25)
CALCIUM SPEC-SCNC: 7.5 MG/DL (ref 8.6–10.5)
CHLORIDE SERPL-SCNC: 109 MMOL/L (ref 98–107)
CO2 SERPL-SCNC: 21 MMOL/L (ref 22–29)
CREAT BLD-MCNC: 0.51 MG/DL (ref 0.76–1.27)
DEPRECATED RDW RBC AUTO: 46.3 FL (ref 37–54)
EOSINOPHIL # BLD AUTO: 0.16 10*3/MM3 (ref 0–0.4)
EOSINOPHIL NFR BLD AUTO: 1.2 % (ref 0.3–6.2)
ERYTHROCYTE [DISTWIDTH] IN BLOOD BY AUTOMATED COUNT: 14.4 % (ref 12.3–15.4)
GFR SERPL CREATININE-BSD FRML MDRD: >150 ML/MIN/1.73
GLOBULIN UR ELPH-MCNC: 3.4 GM/DL
GLUCOSE BLD-MCNC: 147 MG/DL (ref 65–99)
GLUCOSE BLDC GLUCOMTR-MCNC: 117 MG/DL (ref 70–130)
GLUCOSE BLDC GLUCOMTR-MCNC: 121 MG/DL (ref 70–130)
GLUCOSE BLDC GLUCOMTR-MCNC: 184 MG/DL (ref 70–130)
GLUCOSE BLDC GLUCOMTR-MCNC: 201 MG/DL (ref 70–130)
HCT VFR BLD AUTO: 28.6 % (ref 37.5–51)
HGB BLD-MCNC: 8.9 G/DL (ref 13–17.7)
IMM GRANULOCYTES # BLD AUTO: 0.11 10*3/MM3 (ref 0–0.05)
IMM GRANULOCYTES NFR BLD AUTO: 0.8 % (ref 0–0.5)
LYMPHOCYTES # BLD AUTO: 2.08 10*3/MM3 (ref 0.7–3.1)
LYMPHOCYTES NFR BLD AUTO: 15.9 % (ref 19.6–45.3)
MAGNESIUM SERPL-MCNC: 1.3 MG/DL (ref 1.6–2.6)
MCH RBC QN AUTO: 27.2 PG (ref 26.6–33)
MCHC RBC AUTO-ENTMCNC: 31.1 G/DL (ref 31.5–35.7)
MCV RBC AUTO: 87.5 FL (ref 79–97)
MONOCYTES # BLD AUTO: 0.69 10*3/MM3 (ref 0.1–0.9)
MONOCYTES NFR BLD AUTO: 5.3 % (ref 5–12)
NEUTROPHILS # BLD AUTO: 10 10*3/MM3 (ref 1.7–7)
NEUTROPHILS NFR BLD AUTO: 76.2 % (ref 42.7–76)
NRBC BLD AUTO-RTO: 0 /100 WBC (ref 0–0.2)
PLATELET # BLD AUTO: 358 10*3/MM3 (ref 140–450)
PMV BLD AUTO: 10.9 FL (ref 6–12)
POTASSIUM BLD-SCNC: 3.6 MMOL/L (ref 3.5–5.2)
PROT SERPL-MCNC: 5.6 G/DL (ref 6–8.5)
RBC # BLD AUTO: 3.27 10*6/MM3 (ref 4.14–5.8)
SODIUM BLD-SCNC: 137 MMOL/L (ref 136–145)
WBC NRBC COR # BLD: 13.12 10*3/MM3 (ref 3.4–10.8)

## 2019-10-18 PROCEDURE — 25010000002 MAGNESIUM SULFATE 2 GM/50ML SOLUTION: Performed by: INTERNAL MEDICINE

## 2019-10-18 PROCEDURE — 25010000002 ONDANSETRON PER 1 MG: Performed by: PHYSICIAN ASSISTANT

## 2019-10-18 PROCEDURE — 99232 SBSQ HOSP IP/OBS MODERATE 35: CPT | Performed by: NURSE PRACTITIONER

## 2019-10-18 PROCEDURE — 74177 CT ABD & PELVIS W/CONTRAST: CPT

## 2019-10-18 PROCEDURE — 85025 COMPLETE CBC W/AUTO DIFF WBC: CPT | Performed by: NURSE PRACTITIONER

## 2019-10-18 PROCEDURE — 0 DIATRIZOATE MEGLUMINE & SODIUM PER 1 ML

## 2019-10-18 PROCEDURE — 82962 GLUCOSE BLOOD TEST: CPT

## 2019-10-18 PROCEDURE — 63710000001 INSULIN LISPRO (HUMAN) PER 5 UNITS: Performed by: NURSE PRACTITIONER

## 2019-10-18 PROCEDURE — 80053 COMPREHEN METABOLIC PANEL: CPT | Performed by: NURSE PRACTITIONER

## 2019-10-18 PROCEDURE — 63710000001 INSULIN DETEMIR PER 5 UNITS: Performed by: PHYSICIAN ASSISTANT

## 2019-10-18 PROCEDURE — 25010000002 IOPAMIDOL 61 % SOLUTION: Performed by: INTERNAL MEDICINE

## 2019-10-18 PROCEDURE — 25010000002 CEFTRIAXONE: Performed by: INTERNAL MEDICINE

## 2019-10-18 PROCEDURE — 83735 ASSAY OF MAGNESIUM: CPT | Performed by: NURSE PRACTITIONER

## 2019-10-18 RX ORDER — MAGNESIUM SULFATE HEPTAHYDRATE 40 MG/ML
4 INJECTION, SOLUTION INTRAVENOUS AS NEEDED
Status: DISCONTINUED | OUTPATIENT
Start: 2019-10-18 | End: 2019-10-23 | Stop reason: HOSPADM

## 2019-10-18 RX ORDER — MAGNESIUM SULFATE HEPTAHYDRATE 40 MG/ML
2 INJECTION, SOLUTION INTRAVENOUS AS NEEDED
Status: DISCONTINUED | OUTPATIENT
Start: 2019-10-18 | End: 2019-10-23 | Stop reason: HOSPADM

## 2019-10-18 RX ORDER — PROMETHAZINE HYDROCHLORIDE 12.5 MG/1
6.25 TABLET ORAL EVERY 6 HOURS PRN
Status: DISCONTINUED | OUTPATIENT
Start: 2019-10-18 | End: 2019-10-18

## 2019-10-18 RX ADMIN — RENAGEL 800 MG: 800 TABLET ORAL at 17:23

## 2019-10-18 RX ADMIN — METOPROLOL TARTRATE 100 MG: 100 TABLET ORAL at 08:52

## 2019-10-18 RX ADMIN — INSULIN DETEMIR 20 UNITS: 100 INJECTION, SOLUTION SUBCUTANEOUS at 21:00

## 2019-10-18 RX ADMIN — SODIUM CHLORIDE, PRESERVATIVE FREE 10 ML: 5 INJECTION INTRAVENOUS at 01:41

## 2019-10-18 RX ADMIN — INSULIN DETEMIR 20 UNITS: 100 INJECTION, SOLUTION SUBCUTANEOUS at 09:00

## 2019-10-18 RX ADMIN — MAGNESIUM SULFATE 2 G: 2 INJECTION INTRAVENOUS at 11:53

## 2019-10-18 RX ADMIN — INSULIN LISPRO 4 UNITS: 100 INJECTION, SOLUTION INTRAVENOUS; SUBCUTANEOUS at 17:25

## 2019-10-18 RX ADMIN — METOPROLOL TARTRATE 100 MG: 100 TABLET ORAL at 21:00

## 2019-10-18 RX ADMIN — Medication 250 MG: at 08:53

## 2019-10-18 RX ADMIN — AMLODIPINE BESYLATE 10 MG: 10 TABLET ORAL at 08:52

## 2019-10-18 RX ADMIN — RENAGEL 800 MG: 800 TABLET ORAL at 08:52

## 2019-10-18 RX ADMIN — Medication: at 12:21

## 2019-10-18 RX ADMIN — INSULIN LISPRO 2 UNITS: 100 INJECTION, SOLUTION INTRAVENOUS; SUBCUTANEOUS at 21:00

## 2019-10-18 RX ADMIN — CEFTRIAXONE 2 G: 2 INJECTION, POWDER, FOR SOLUTION INTRAMUSCULAR; INTRAVENOUS at 15:00

## 2019-10-18 RX ADMIN — NYSTATIN: 100000 POWDER TOPICAL at 21:00

## 2019-10-18 RX ADMIN — SODIUM CHLORIDE 100 ML/HR: 9 INJECTION, SOLUTION INTRAVENOUS at 00:28

## 2019-10-18 RX ADMIN — DULOXETINE HYDROCHLORIDE 30 MG: 30 CAPSULE, DELAYED RELEASE ORAL at 09:28

## 2019-10-18 RX ADMIN — NYSTATIN: 100000 POWDER TOPICAL at 08:53

## 2019-10-18 RX ADMIN — INSULIN LISPRO 4 UNITS: 100 INJECTION, SOLUTION INTRAVENOUS; SUBCUTANEOUS at 11:53

## 2019-10-18 RX ADMIN — VANCOMYCIN HYDROCHLORIDE 125 MG: KIT at 23:40

## 2019-10-18 RX ADMIN — Medication 250 MG: at 21:00

## 2019-10-18 RX ADMIN — INSULIN LISPRO 4 UNITS: 100 INJECTION, SOLUTION INTRAVENOUS; SUBCUTANEOUS at 17:23

## 2019-10-18 RX ADMIN — TERAZOSIN HYDROCHLORIDE ANHYDROUS 5 MG: 5 CAPSULE ORAL at 20:59

## 2019-10-18 RX ADMIN — INSULIN LISPRO 4 UNITS: 100 INJECTION, SOLUTION INTRAVENOUS; SUBCUTANEOUS at 08:53

## 2019-10-18 RX ADMIN — IOPAMIDOL 85 ML: 612 INJECTION, SOLUTION INTRAVENOUS at 15:30

## 2019-10-18 RX ADMIN — ONDANSETRON 4 MG: 2 INJECTION INTRAMUSCULAR; INTRAVENOUS at 00:11

## 2019-10-18 RX ADMIN — MAGNESIUM SULFATE 2 G: 2 INJECTION INTRAVENOUS at 21:03

## 2019-10-18 RX ADMIN — RENAGEL 800 MG: 800 TABLET ORAL at 11:53

## 2019-10-18 RX ADMIN — MAGNESIUM SULFATE 2 G: 2 INJECTION INTRAVENOUS at 17:23

## 2019-10-18 NOTE — PROGRESS NOTES
Continued Stay Note  [facility]     Patient Name: Kendrick Crystal  MRN: [id number]  Today's Date: 10/18/2019    Admit Date: 10/8/2019    Discharge Plan     Row Name 10/18/19 1620       Plan    Plan  Home with HH    Patient/Family in Agreement with Plan  yes    Plan Comments  Not medically ready for discharge, will arrange [facility] when closer to medical readiness.     Final Discharge Disposition Code  06 - home with home health care        Discharge Codes    No documentation.       Expected Discharge Date and Time     Expected Discharge Date Expected Discharge Time    Oct 17, 2019             Barb Batista RN

## 2019-10-18 NOTE — PLAN OF CARE
Problem: Patient Care Overview  Goal: Plan of Care Review  Outcome: Ongoing (interventions implemented as appropriate)   10/18/19 5528   Plan of Care Review   Progress no change   OTHER   Outcome Summary pt asking to be transfered from bed to bsc with lift. refuses to give any effort with PT today or with assist from staff with getting OOB. pt with flat affect. pt needs alot of encouragement to particiapte with care. no complaints of pain. mag being replaced. CT abdomen w/ contrast done today. will continue to monitor.    Coping/Psychosocial   Plan of Care Reviewed With patient

## 2019-10-18 NOTE — PROGRESS NOTES
[facility] Medicine Services  PROGRESS NOTE    Patient Name: Kendrick Crystal  : 1970  MRN: [id number]    Date of Admission: 10/8/2019  Primary Care Physician: No primary care provider on file.    Subjective   Subjective     CC:  Follow up for klebsiella septicemia    HPI:  Patient reports rough night due to nausea and dry heaving and no sleep.  Denies abdominal pain    Review of Systems  Gen- No fevers, chills  CV- No chest pain, palpitations  Resp- No cough, dyspnea  GI- as above    All other systems reviewed and negative.     Objective   Objective     Vital Signs:   Temp:  [98 °F (36.7 °C)] 98 °F (36.7 °C)  Heart Rate:  [82] 82  Resp:  [16] 16  BP: (127-149)/(73-98) 149/98        Physical Exam:  Constitutional: No acute distress, awake, alert  HENT: NCAT, mucous membranes moist  Respiratory: Clear to auscultation bilaterally, respiratory effort normal   Cardiovascular: RRR, no murmurs, rubs, or gallops, palpable pedal pulses bilaterally  Gastrointestinal: Positive bowel sounds, soft, nontender, nondistended  Musculoskeletal: Trace RLE ankle edema  Psychiatric: Appropriate affect, cooperative  Neurologic: Oriented x 3, OLGUIN, speech clear  Skin: No rashes. Left BKA      Results Reviewed:    Results from last 7 days   Lab Units 10/18/19  0953 10/17/19  0545 10/16/19  0625   WBC 10*3/mm3 13.12* 15.48* 15.73*   HEMOGLOBIN g/dL 8.9* 8.9* 8.8*   HEMATOCRIT % 28.6* 28.8* 28.3*   PLATELETS 10*3/mm3 358 339 348     Results from last 7 days   Lab Units 10/18/19  0953 10/16/19  0625 10/15/19  0356   SODIUM mmol/L 137 134* 136   POTASSIUM mmol/L 3.6 3.6 3.7   CHLORIDE mmol/L 109* 100 100   CO2 mmol/L 21.0* 25.0 26.0   BUN mg/dL 14 16 12   CREATININE mg/dL 0.51* 0.52* 0.53*   GLUCOSE mg/dL 147* 170* 91   CALCIUM mg/dL 7.5* 7.6* 7.9*   ALT (SGPT) U/L 16 16  --    AST (SGOT) U/L 20 17  --      Estimated Creatinine Clearance: 231.5 mL/min (A) (by C-G formula based on SCr of 0.51 mg/dL  (L)).    Microbiology Results Abnormal     Procedure Component Value - Date/Time    Fungus Culture - Body Fluid, Peritoneum [[id number]] Collected:  10/09/19 1508    Lab Status:  Preliminary result Specimen:  Body Fluid from Peritoneum Updated:  10/16/19 1704     Fungus Culture No fungus isolated at 1 week    Anaerobic Culture - Body Fluid, Peritoneum [[id number]] Collected:  10/09/19 1508    Lab Status:  Final result Specimen:  Body Fluid from Peritoneum Updated:  10/14/19 0935     Anaerobic Culture No anaerobes isolated at 5 days    Urine Culture - Urine, Urine, Clean Catch [[id number]]  (Abnormal)  (Susceptibility) Collected:  10/08/19 2256    Lab Status:  Final result Specimen:  Urine, Clean Catch Updated:  10/12/19 0931     Urine Culture >100,000 CFU/mL Klebsiella pneumoniae ssp pneumoniae      >100,000 CFU/mL Escherichia coli    Susceptibility      Klebsiella pneumoniae ssp pneumoniae     EYAD     Ampicillin Resistant     Ampicillin + Sulbactam Susceptible     Cefazolin Susceptible     Cefepime Susceptible     Ceftazidime Susceptible     Ceftriaxone Susceptible     Gentamicin Susceptible     Levofloxacin Susceptible     Nitrofurantoin Intermediate     Piperacillin + Tazobactam Susceptible     Tetracycline Susceptible     Trimethoprim + Sulfamethoxazole Susceptible                Susceptibility      Escherichia coli     EYAD     Ampicillin Resistant     Ampicillin + Sulbactam Intermediate     Cefazolin Susceptible     Cefepime Susceptible     Ceftazidime Susceptible     Ceftriaxone Susceptible     Gentamicin Susceptible     Levofloxacin Resistant     Nitrofurantoin Susceptible     Piperacillin + Tazobactam Susceptible     Tetracycline Susceptible     Trimethoprim + Sulfamethoxazole Susceptible                    Body Fluid Culture - Body Fluid, Peritoneum [[id number]]  (Abnormal)  (Susceptibility) Collected:  10/09/19 1508    Lab Status:  Final result Specimen:  Body Fluid from Peritoneum Updated:  10/11/19 0621      Body Fluid Culture Moderate growth (3+) Klebsiella pneumoniae ssp pneumoniae     Gram Stain Many (4+) WBCs per low power field      Moderate (3+) Gram negative bacilli    Susceptibility      Klebsiella pneumoniae ssp pneumoniae     EYAD     Ampicillin Resistant     Ampicillin + Sulbactam Susceptible     Cefazolin Susceptible     Cefepime Susceptible     Ceftazidime Susceptible     Ceftriaxone Susceptible     Gentamicin Susceptible     Levofloxacin Susceptible     Piperacillin + Tazobactam Susceptible     Trimethoprim + Sulfamethoxazole Susceptible                    Blood Culture - Blood, Arm, Right [[id number]]  (Abnormal) Collected:  10/08/19 2012    Lab Status:  Final result Specimen:  Blood from Arm, Right Updated:  10/10/19 2122     Blood Culture Klebsiella pneumoniae ssp pneumoniae     Isolated from Aerobic and Anaerobic Bottles     Gram Stain Aerobic Bottle Gram negative bacilli      Anaerobic Bottle Gram negative bacilli    Narrative:       Refer to blood culture collected 10/8/2019 at 2000 for MICs.    Blood Culture - Blood, Arm, Left [[id number]]  (Abnormal)  (Susceptibility) Collected:  10/08/19 2000    Lab Status:  Final result Specimen:  Blood from Arm, Left Updated:  10/10/19 2122     Blood Culture Klebsiella pneumoniae ssp pneumoniae     Isolated from Aerobic and Anaerobic Bottles     Gram Stain Anaerobic Bottle Gram negative bacilli      Aerobic Bottle Gram negative bacilli    Susceptibility      Klebsiella pneumoniae ssp pneumoniae     EYAD     Ampicillin Resistant     Ampicillin + Sulbactam Susceptible     Cefazolin Susceptible     Cefepime Susceptible     Ceftazidime Susceptible     Ceftriaxone Susceptible     Gentamicin Susceptible     Levofloxacin Susceptible     Piperacillin + Tazobactam Susceptible     Trimethoprim + Sulfamethoxazole Susceptible                    Blood Culture ID, PCR - Blood, Arm, Left [[id number]]  (Abnormal) Collected:  10/08/19 2000    Lab Status:  Final result Specimen:   Blood from Arm, Left Updated:  10/09/19 1038     BCID, PCR Klebsiella pneumoniae. Identification by BCID PCR.          Imaging Results (last 24 hours)     Procedure Component Value Units Date/Time    CT Abdomen Pelvis With Contrast [[id number]] Collected:  10/18/19 1452     Updated:  10/18/19 1730    Narrative:       EXAMINATION: CT ABDOMEN AND PELVIS WITH CONTRAST-10/18/2019:      INDICATION: Reassess perinephric abscess and rule out other  intraabdominal pathology; N15.1-Renal and perinephric abscess;  A41.9-Sepsis, unspecified organism; E86.0-Dehydration; E11.65-Type 2  diabetes mellitus with hyperglycemia; I10-Essential (primary)  hypertension; Z86.14-Personal history of Methicillin resistant  Staphylococcus aureus infection; K74.60-Unspecified cirrhosis of liver;  Z74.09-Other reduced m.      TECHNIQUE: CT abdomen and pelvis with intravenous contrast.     The radiation dose reduction device was turned on for each scan per the  ALARA (As Low as Reasonably Achievable) protocol.     COMPARISON: CT dated 10/09/2019.     FINDINGS: The lung bases demonstrate numerous nodular densities within  the bilateral lung bases some of which are calcified, however, the  majority of which are noncalcified measuring up to 13 mm right lower  lobe medial segment which has central area of potential lucency and  developing cavitation along with a separate site in the more superior  right lower lobe raising suspicion for infectious or inflammatory  process along with trace bilateral pleural effusions, right greater than  left.     Liver without focal lesion. Gallbladder unremarkable. No biliary  dilatation. Pancreas and spleen unremarkable. Adrenals demonstrate  stable left adrenal nodule. Kidneys without hydronephrosis or  hydroureter demonstrating a grossly stable appearing fluid collection of  likely subcapsular/perinephric location inferior pole right kidney  without significant change from prior. No gas containing within  this  fluid collection or new soft tissue enhancement/nodularity. GI tract  without focal thickening or disproportionate dilatation of bowel. No new  focal fluid collection or abscess is evident. Pelvic viscera  unremarkable. Degenerative disease of the spine without aggressive  osseous lesion. Mild body wall edema.       Impression:       1.  Grossly stable appearance of perinephric/subcapsular fluid  associated with the right kidney without evidence for progressive  nodularity/enhancement or gas contained within this collection from  prior comparison.  2.  No new focal fluid collection or loculated fluid collection within  the abdomen.  3.  The lung bases demonstrate numerous bilateral pulmonary nodules  several of which were present on prior examinations including largest  medial segment right lower lobe with now trace bilateral pleural  effusions. Some of these have foci of central lucency concerning for  cavitary components of infectious or inflammatory process.     D:  10/18/2019  E:  10/18/2019     This report was finalized on 10/18/2019 5:27 PM by Dr. Jb Campos.             Results for orders placed during the hospital encounter of 05/18/19   Adult Transthoracic Echo Complete W/ Cont if Necessary Per Protocol    Narrative · Estimated EF = 60%.  · Mild mitral valve regurgitation is present  · Mild tricuspid valve regurgitation is present.  · Calculated right ventricular systolic pressure from tricuspid   regurgitation is 29 mmHg.          I have reviewed the medications:  Scheduled Meds:    amLODIPine 10 mg Oral Q24H   ceftriaxone 2 g Intravenous Q24H   DULoxetine 30 mg Oral Daily   insulin detemir 20 Units Subcutaneous Q12H   insulin lispro 0-9 Units Subcutaneous 4x Daily With Meals & Nightly   insulin lispro 4 Units Subcutaneous TID With Meals   metoprolol tartrate 100 mg Oral Q12H   nystatin  Topical Q12H   saccharomyces boulardii 250 mg Oral BID   sevelamer 800 mg Oral TID With Meals   sodium chloride  10 mL Intravenous Q12H   sodium chloride 10 mL Intravenous Q12H   terazosin 5 mg Oral Nightly   vancomycin 125 mg Oral Q6H     Continuous Infusions:    sodium chloride 100 mL/hr Last Rate: 100 mL/hr (10/18/19 0028)     PRN Meds:.•  acetaminophen **OR** acetaminophen **OR** acetaminophen  •  dextrose  •  dextrose  •  glucagon (human recombinant)  •  HYDROcodone-acetaminophen  •  LORazepam  •  magnesium sulfate **OR** magnesium sulfate **OR** magnesium sulfate  •  melatonin  •  ondansetron  •  potassium chloride **OR** potassium chloride **OR** potassium chloride  •  sodium chloride  •  sodium chloride      Assessment/Plan   Assessment / Plan     Active Hospital Problems    Diagnosis  POA   • **Perinephric abscess [N15.1]  Yes   • Bacteremia due to Klebsiella pneumoniae [R78.81]  Unknown   • Acute UTI (urinary tract infection) [N39.0]  Yes   • Gastroparesis [K31.84]  Yes   • S/P BKA (below knee amputation), left (CMS/HCC) [Z89.512]  Not Applicable   • Type 2 diabetes mellitus with circulatory disorder and uncontrolled [E11.59]  Yes   • DUNLAP Cirrhosis [K74.60]  Yes   • Essential hypertension [I10]  Yes   • Hyperlipemia [E78.5]  Yes   • Non-compliance [Z91.14]  Not Applicable      Resolved Hospital Problems   No resolved problems to display.        Brief Hospital Course to date:  Kendrick Crystal is a 49 y.o. male  48 yo gentleman new patient for me today, hx of T2DM, hypertension, hx of multiple skin/abscesses/MRSA infection, DUNLAP cirrhosis, PVD s/p L BKA, and  presented with sepsis 2/2 renal and perinephric abscess s/p IR guided drainage of abscess culture growing Klebsiella pneumoniae currently on ceftriaxone.    Sepsis 2/2 perinephric abscess and UTI, Klebsiella pneuomniae bacteremia  -cultures from peritoneum and blood cultures from 10/10 growing Klebsiella resistant to ampicillin   -ID following, on ceftriaxone (end date 10/22/2019)  -PICC placed 10/15  -urology consulted, pt s/p cystoscopy 10/16 to evaluate for  hematuria and assess for poss fistula from bowel to bladder ?.   Findings: bullous edema on floor of bladder likely from his indwelling childs. Unable to cannulate a fistula.  -will continue medical management for now    Hx of C diff with intermittent loose stool:   --continue vancomycin (end date 10/29/2019)  --per staff, stool slightly thickening.    --reports no stool today    Hx of perirectal abscess:   --CT abd pelvis on 10/8 showed small right perinephric fluid collection along inferior capsule, advanced cirrhosis, Dr Payne not recommending surgery at this time, will continue medical management   --ID/CRS following  --repeat CT today  --WBC down today    HTN:   --continue lopressor 100 mg q12h and amlodipine 10 mg q24h    T2DM poorly controlled:   --A1C 14.1, adjust as needed  Component      Latest Ref Rng & Units 10/18/2019 10/18/2019 10/18/2019 10/18/2019           7:56 AM  9:53 AM 11:31 AM  5:07 PM   Glucose      70 - 130 mg/dL 117 147 (H) 121 201 (H)     Weakness  --reconsult PT/OT eval   --pt had prior dismissed them.  Previous discussion had with pt/wife on need for up oob, attempt stand/pivot, pt agreed reluctantly to see PT again.        DVT Prophylaxis:  Mechanical     Disposition: I expect the patient to be discharged when work-up for klebsiella UTI and bacteremia is complete.  Plans for home with     CODE STATUS:   Code Status and Medical Interventions:   Ordered at: 10/08/19 9463     Level Of Support Discussed With:    Patient     Code Status:    CPR     Medical Interventions (Level of Support Prior to Arrest):    Full         Electronically signed by CHINTAN Hamilton, 10/18/19, 6:07 PM.

## 2019-10-18 NOTE — PROGRESS NOTES
Adult Nutrition  Assessment/PES    Patient Name:  Kendrick Crystal  YOB: 1970  MRN: [id number]  Admit Date:  10/8/2019    Assessment Date:  10/18/2019      Reason for Assessment     Row Name 10/18/19 1322          Reason for Assessment    Reason For Assessment  follow-up protocol 25 mins     Diagnosis  -- per notes this adm.                   Nutrition Prescription Ordered     Row Name 10/18/19 1323 10/18/19 1322       Nutrition Prescription PO    Supplement  Boost Glucose Control   --    Supplement Frequency  3 times a day   pt is drinking 2/d currently, per significant other report.  --    Common Modifiers  --  Consistent Carbohydrate        Evaluation of Received Nutrient/Fluid Intake     Row Name 10/18/19 1322          PO Evaluation    Number of Meals  3   per data avail since 10/15.     % PO Intake  58               Problem/Interventions:  Problem 1     Row Name 10/18/19 1324          Nutrition Diagnoses Problem 1    Problem 1  Inadequate Intake/Infusion     Etiology (related to)  MNT for Treatment/Condition     Signs/Symptoms (evidenced by)  PO Intake     Percent (%) intake recorded  58 %     Over number of meals  3; pt also consuming 2 Boost glucose control supplements/d per significant other report.                 Intervention Goal     Row Name 10/18/19 1324          Intervention Goal    PO  Increase intake         Nutrition Intervention     Row Name 10/18/19 1324          Nutrition Intervention    RD/Tech Action  Follow Tx progress;Supplement provided;Encourage intake;Interview for preference           Education/Evaluation     Row Name 10/18/19 1324          Monitor/Evaluation    Monitor  Per protocol           Electronically signed by:  Carolyn Mcgarry MS,RD,LD  10/18/19 1:24 PM

## 2019-10-18 NOTE — PROGRESS NOTES
[facility] Progress Note        Antibiotics:  Anti-Infectives (From admission, onward)    Ordered     Dose/Rate Route Frequency Start Stop    10/15/19 1127  vancomycin oral solution reconstituted 125 mg     Jeyson Neely [facility] reviewed the order on 10/17/19 1241.   Ordering Provider:  Usman Dong MD    125 mg Oral Every 6 Hours Scheduled 10/15/19 1215 10/29/19 1159    10/12/19 1339  cefTRIAXone (ROCEPHIN) 2 g/100 mL 0.9% NS VTB (EVENS)     Ordering Provider:  Ayad Bolunt MD    2 g  over 30 Minutes Intravenous Every 24 Hours 10/12/19 1500 10/22/19 1459    10/09/19 1136  meropenem (MERREM) 1 g/100 mL 0.9% NS VTB (mbp)     Ordering Provider:  Usman Dong MD    1 g  over 30 Minutes Intravenous Once 10/09/19 1230 10/09/19 1251    10/08/19 2338  piperacillin-tazobactam (ZOSYN) 3.375 g in iso-osmotic dextrose 50 ml (premix)     Ordering Provider:  Yasmany Martinez PA-C    3.375 g  over 30 Minutes Intravenous Once 10/08/19 2340 10/09/19 0016          CC: fatigue    HPI:  Patient is a 49 y.o.  Yr old male with history of poorly contolled T2DM, DUNLAP/liver cirrhosis, HTN, PVD/Left BKA, and multiple skin abscesses/MRSA who presented to [facility] ED with right shin wound infection summer 2018.  He was unable to get to see Dr. Martinez as his father passed away unexpectedly on 18 and he had to wait until after the .  The wound worsened becoming gangrenous and he came to [facility] ED in 2018.  He also had been hospitalized at [facility] and then transferred to  for a severe penis/scrotum infection requiring surgical debridement in summer 2018.  He received antibiotics regarding his right leg infection, generally improved over the remainder of summer 2018 but that area had not completely healed. He was admitted 2019 with acute left lower abdominal wall redness/swelling and pain, spontaneous drainage associated with fever/chills and uncontrolled blood sugars.   I&D requiring wide  debridement , severe/deep infection and transferred to [facility] on May 3 with IV Zosyn/oral doxycycline. Cultures had lactobacillus and mixed gram-positive skin sherly including alpha strep, staph epidermidis and corynebacterium. Readmitted to [facility] May 18, 2019, increasing generalized edema ultimately transitioned to oral doxycycline and healed.     He presented to the emergency room July 30, 2019 with acute rectal pain that had begun 7/27; this is associated with constipation for days, chills and nausea/dry heaving.  White blood cell count elevated, high hemoglobin A1c over 13, urinalysis with hematuria/pyuria and CT scan with 3 x 3 cm fluid collection anterior to the distal rectum and per discussion with Dr. Akbar/Jennifer, this is not in the prostate.  Colorectal surgery/urology saw him for consideration of surgical options/timing/threshold, etc..     8/2/19 I&Dper Dr Payne perirectal abscess; patient reports that he had instrumented his rectum prior to hospitalization with enema     8/3/19 urinary retention overnight requiring in/out catheterization per patient.  Creat climb     8/4/19 further creat climb; childs placed and lisinopril stopped and d/w Dr Rubio;  ?obstruction initially associated with retention postop (1200 out with I/O cath postop per nursing) -v- contrast from CT -v- lisinopril/medication/vancomycin -v- relative hypotension -v- likely combination of factors with ATN; nephrology following; vancomycin trough high and vancomycin stopped empirically 8/3 although high trough potentially accumulation as a consequence of diminishing renal function associated with retention/contrast/pressure rather than cause.  Nonetheless, avoid for now; creatinine trending down.        8/7 in retrospect he remembers air in his urine at admit which has raised concern for possible fistula from urinary tract;  No fecaluria and culture from abscess is fecal sherly;  ?rectal-urethral fistula;  Dr Payne  "aware     Culture with E. coli/Klebsiella species and creatinine generally trended down, transitioned to oral antibiotics at discharge.     Readmitted August 17, 2019 with nausea/vomiting/dry heaves for 3 days.  Walker catheter was placed and CT scan of the abdomen showed:      \"Previously seen fluid collection identified inferior to the prostate gland is more increased in density on today's examination. There is wall thickening again seen throughout the bladder. Findings again are concerning for cystitis.\" per radiology     He was transitioned to oral omnicef/topical antifungal on approx 8/23/19; Noncompliant with f/u in our office     READMITTED 10/8/19 RLQ abd pain, urinary retention, nausea, dysuria and generalized fatigue     UTI by U/A, subsequent blood cultures positive for GNR and initial PCR with kleb sp, no carbapenem resistance by initial PCR per my d/w micro;  Abnormal CT abd with right perinephric fluid collection, advanced cirrhosis, urinary bladder thickening.  10/9 Aspirate of perinephric fluid collection consistent with abscess/gram-negative lon and white blood cells     10/16/19 cytoscopy with bullous change per Dr Gutierres but no fistula per him    10/18/19 intermittent nausea/dry heaves although not this am; denies new pain;  intermittent diarrhea but thickening; He currently denies abd pain; no overt hematuria or pyuria;  He denied any  fecaluria prior to admit to me.       No headache photophobia or neck stiffness.  No shortness of breath cough or hemoptysis.  No diarrhea.  No hematochezia melena or hematemesis.  No skin rash.     ROS:      10/18/19 No f/c/s. No n/v/d. No rash. No new ADR to Abx.     Constitutional-- No Fever, chills or sweats.  Appetite diminished with fatigue  Heent-- No new vision, hearing or throat complaints.  No epistaxis or oral sores.  Denies odynophagia or dysphagia.  No flashers, floaters or eye pain. No odynophagia or dysphagia. No headache, photophobia or neck " "stiffness.  CV-- No chest pain, palpitation or syncope  Resp-- No SOB/cough/Hemoptysis  GI-  No hematochezia, melena, or hematemesis. Denies jaundice.  -- No dysuria, hematuria, or flank pain.  Denies hesitancy, urgency or flank pain.  Lymph- no swollen lymph nodes in neck/axilla or groin.   Heme- No active bruising or bleeding; no Hx of DVT or PE.  MS-- no swelling or pain in the bones or joints of arms/legs.  No new back pain.  Neuro-- No acute focal weakness or numbness in the arms or legs.  No seizures.     Full 12 point review of systems reviewed and negative otherwise for acute complaints, except for above      PE:   /98 (BP Location: Left arm, Patient Position: Lying)   Pulse 82   Temp 98 °F (36.7 °C) (Oral)   Resp 16   Ht 190.5 cm (75\")   Wt 112 kg (246 lb)   SpO2 100%   BMI 30.75 kg/m²     GENERAL: awake, in no acute distress.   HEENT: Normocephalic, atraumatic.  PERRL. EOMI. No conjunctival injection. No icterus. Oropharynx clear without evidence of thrush or exudate. No evidence of peridontal disease.    NECK: Supple without nuchal rigidity. No mass.  LYMPH: No cervical, axillary or inguinal lymphadenopathy.  HEART: RRR; No murmur, rubs, gallops.   LUNGS: Diminished at bases bilaterally without wheezing, rales, rhonchi. Normal respiratory effort. Nonlabored. No dullness.  ABDOMEN: Soft, nontender, nondistended. Positive bowel sounds. No rebound or guarding. NO mass or HSM.  EXT:  No cyanosis, clubbing or edema. No cord.  : no new findings; +childs  MSK: FROM without joint effusions noted arms/legs.    SKIN: Warm and dry without cutaneous eruptions on Inspection/palpation.    NEURO: awake     Amputee status noted with left BKA     No CVA tenderness     No peripheral stigmata/phenomena of endocarditis    Laboratory Data    Results from last 7 days   Lab Units 10/17/19  0545 10/16/19  0625 10/15/19  0356   WBC 10*3/mm3 15.48* 15.73* 15.01*   HEMOGLOBIN g/dL 8.9* 8.8* 9.3*   HEMATOCRIT % 28.8* " 28.3* 29.7*   PLATELETS 10*3/mm3 339 348 345     Results from last 7 days   Lab Units 10/16/19  0625   SODIUM mmol/L 134*   POTASSIUM mmol/L 3.6   CHLORIDE mmol/L 100   CO2 mmol/L 25.0   BUN mg/dL 16   CREATININE mg/dL 0.52*   GLUCOSE mg/dL 170*   CALCIUM mg/dL 7.6*     Results from last 7 days   Lab Units 10/16/19  0625   ALK PHOS U/L 261*   BILIRUBIN mg/dL <0.2*   ALT (SGPT) U/L 16   AST (SGOT) U/L 17               Estimated Creatinine Clearance: 227 mL/min (A) (by C-G formula based on SCr of 0.52 mg/dL (L)).      Microbiology:      Radiology:  Imaging Results (last 72 hours)     Procedure Component Value Units Date/Time    CT Guided Abscess Drain Retroperitoneal [[id number]] Collected:  10/09/19 1611     Updated:  10/09/19 1715    Narrative:       EXAMINATION: CT GUIDED ABSCESS DRAIN RETROPERITONEAL- 10/09/2019     INDICATION: N15.1-Renal and perinephric abscess; A41.9-Sepsis,  unspecified organism; E86.0-Dehydration; E11.65-Type 2 diabetes mellitus  with hyperglycemia; I10-Essential (primary) hypertension;  Z86.14-Personal history of Methicillin resistant Staphylococcus aureus  infection; K74.60-Unspecified cirrhosis of liver; fluid collection     TECHNIQUE: CT-guided drainage of a perinephric fluid collection     The radiation dose reduction device was turned on for each scan per the  ALARA (As Low as Reasonably Achievable) protocol.     COMPARISON: NONE     FINDINGS: Patient referred for CT-guided drainage of a perinephric fluid  collection. Procedure and risks were explained to the patient. Informed  consent was obtained and signed. Patient placed in the left lateral  position upon the table. The right flank was prepped and draped in a  sterile fashion. 1% lidocaine used as a local anesthetic. Under CT  fluoroscopic guidance an 18-gauge 15 cm Chiba needle was advanced into  the perinephric fluid collection of approximately 10 ml of a reddish  fluid was removed with some debris within the fluid. There is a  striated  delayed nephrogram seen of the right kidney suggesting possible  pyelonephritis with surrounding stranding of the perinephric fat.  Myelolipoma seen on the adrenal gland on the left. Chiba needle was  removed and no immediate complication occurred. Slight decrease seen in  size of the perinephric fluid collection status post drainage.       Impression:       CT-guided drainage of a perinephric fluid collection with no  immediate complication.     D:  10/09/2019  E:  10/09/2019     This report was finalized on 10/9/2019 5:12 PM by Dr. Nella Enriquez MD.       CT Abdomen Pelvis With Contrast [[id number]] Collected:  10/08/19 2202     Updated:  10/08/19 2222    Narrative:       CT ABDOMEN AND PELVIS WITH CONTRAST, 10/8/2019    HISTORY:  49-year-old male in the ED complaining of 2 day history right side abdomen pain, nausea, vomiting and generalized weakness. History of hepatic cirrhosis (DUNLAP). History of diabetes with left BKA and multiple abdominal wall abscesses and lower extremity  cellulitis in 2018.    TECHNIQUE:  CT imaging of the abdomen and pelvis with IV contrast. Radiation dose reduction techniques included automated exposure control. Radiation audit for CT and nuclear cardiology exams in the last 12 months: 7.    COMPARISON:  *  CT abdomen/pelvis, 8/18/2019.    ABDOMEN FINDINGS:  Images show a right lower perinephric fluid collection that is either subcapsular or pericapsular. This extends along the lower kidney for a length of about 8.5 cm and has a maximum thickness of 2.5 cm. The fluid is mixed attenuation and may be  hemorrhagic. Correlate for any recent history of blunt trauma to the flank. Renal parenchyma shows no disruption or abnormal enhancement. There is no fracture of overlying right lower posterior ribs or transverse spinous processes. Given the history,  perinephric abscess is possible but is significantly less likely given normal renal parenchymal enhancement. No evidence of  urinary obstruction. No visible nephrolithiasis.    Advanced hepatic cirrhosis. Mild splenomegaly. No suspicious liver lesion. No upper abdominal ascites. Hepatic vasculature appears patent. No gallbladder distention or bile duct dilatation. Negative pancreas.    Benign left adrenal myelolipoma measuring about 2.9 cm. Normal caliber abdominal aorta. Small bowel and colon are normal in caliber and appearance, as imaged. The appendix is normal. No gastric distention or distal esophageal dilatation.    PELVIS FINDINGS:  Air within the urinary bladder, likely iatrogenic. There is at least mild diffuse bladder wall thickening. Prostate and rectum are unremarkable. No inguinal hernia.    Lung base images show multiple benign calcified granulomas.      Impression:       1.  Small mixed attenuation right perinephric fluid collection along the inferior capsule, not present on the prior exam. Perinephric hematoma is a consideration. Correlate clinically for any history of blunt flank trauma. While abscess is possible, this  is unlikely as right renal parenchyma enhances normally with no evidence of pyelonephritis. Laboratory correlation recommended.  2.  Advanced hepatic cirrhosis. Splenomegaly.  3.  Urinary bladder wall thickening. Gas within the bladder is most likely iatrogenic.  4.  Benign left adrenal myelolipoma.    Signer Name: Fadi Healy MD   Signed: 10/8/2019 10:02 PM   [facility]station [facility]: [email or web address]    [facility]    XR Chest PA & Lateral [[id number]] Collected:  10/08/19 2210     Updated:  10/08/19 2212    Narrative:       CHEST X-RAY, 10/8/2019      HISTORY:    49-year-old male in the ED with reported diabetic ketoacidosis.      TECHNIQUE:  AP portable upright chest series.      FINDINGS:  Heart size and pulmonary vascularity are normal. The lungs are expanded and clear. No visible pulmonary infiltrate or pleural effusion. Lower lung benign calcified granulomas.      Impression:        Negative chest.    Signer Name: Fadi Healy MD   Signed: 10/8/2019 10:10 PM   Workstation Name: [email or web address]-    [facility]            Impression:      --Acute Kleb septicemia/sepsis, likely urinary source/pyelnoephritis associated with urinary retention and  with abnormal CT scan with perinephric collection/abscess and Kleb on aspirate;  IV rocephin ongoing; urology to determine any timing/option or threshold for further intervention or functional/anatomic assessment.    --Acute perinephric abscess as above;  Likely to need repeat imaging to assess for progress -v- other sequelae; d/w medicine regarding timing    --acute loose stools/intermittent diarrhea; with Hx CDiff per him/family;  Empiric oral vancomycin added with high risk for relapse     --Hx perirectal abscess ; Colorectal surgery, Dr. Payne previously saw and is following.  Drainage as above on August 2 with mxed Fecal sherly in culture; CT scans  with no mention of abscess on 8/18 study;  Dr Payne  following given urology conern for fistula and to help determine any timing/option or threshold for further GI/colorectal work-up     --History urinary retention.  urology following     --Hx ARF in August 2019;  ?obstruction initially associated with retention postop (1200 out with I/O cath postop per nursing) -v- contrast from CT -v- lisinopril/medication/vancomycin -v- relative hypotension -v- likely combination of factors with ATN;  vancomycin trough high and vancomycin stopped empirically 8/3 although high trough potentially accumulation as a consequence of diminishing renal function associated with retention/contrast/pressure rather than cause.  Nonetheless, avoid for now; likely further acute kidney injury at admission August 17 associated with dehydration/prerenal/ATN etiology     --History MRSA;  Not in current culture     --Diabetes mellitus type 2, uncontrolled.  He reports the reason for this is  forgetfulness     --History [place] and chronic liver disease/cirrhosis  per past notes     --Left BKA     --Peripheral vascular disease     --medical noncompliance    PLAN:     --IV rocephin/oral vancomycin      --Check/review labs cultures and scans     --Discussed with microbiology     --History per nursing staff     --Discussed with Dr Payne     --Discussed with family, partial history per them     --Highly complex set of issues with high risk for further serious morbidity and other serious sequela     --Colorectal surgery and urology to follow    --I anticipate repeat CT abd to assess perinephric abscess prior to discharge;  D/w Dr Elizondo    **intermittent nausea/dry heaves;  abd exam benign;  Anticipate repeat CT to assess perinephric abscess and to r/o other IA pathology;  Will d/w medicine      Usman Dong MD  10/18/2019

## 2019-10-19 LAB
ALBUMIN SERPL-MCNC: 2.2 G/DL (ref 3.5–5.2)
ALBUMIN/GLOB SERPL: 0.6 G/DL
ALP SERPL-CCNC: 211 U/L (ref 39–117)
ALT SERPL W P-5'-P-CCNC: 13 U/L (ref 1–41)
ANION GAP SERPL CALCULATED.3IONS-SCNC: 9 MMOL/L (ref 5–15)
AST SERPL-CCNC: 13 U/L (ref 1–40)
BILIRUB SERPL-MCNC: <0.2 MG/DL (ref 0.2–1.2)
BUN BLD-MCNC: 14 MG/DL (ref 6–20)
BUN/CREAT SERPL: 28 (ref 7–25)
CALCIUM SPEC-SCNC: 7.9 MG/DL (ref 8.6–10.5)
CHLORIDE SERPL-SCNC: 110 MMOL/L (ref 98–107)
CO2 SERPL-SCNC: 20 MMOL/L (ref 22–29)
CREAT BLD-MCNC: 0.5 MG/DL (ref 0.76–1.27)
DEPRECATED RDW RBC AUTO: 45.9 FL (ref 37–54)
ERYTHROCYTE [DISTWIDTH] IN BLOOD BY AUTOMATED COUNT: 14.5 % (ref 12.3–15.4)
GFR SERPL CREATININE-BSD FRML MDRD: >150 ML/MIN/1.73
GLOBULIN UR ELPH-MCNC: 3.7 GM/DL
GLUCOSE BLD-MCNC: 96 MG/DL (ref 65–99)
GLUCOSE BLDC GLUCOMTR-MCNC: 118 MG/DL (ref 70–130)
GLUCOSE BLDC GLUCOMTR-MCNC: 131 MG/DL (ref 70–130)
GLUCOSE BLDC GLUCOMTR-MCNC: 137 MG/DL (ref 70–130)
GLUCOSE BLDC GLUCOMTR-MCNC: 138 MG/DL (ref 70–130)
GLUCOSE BLDC GLUCOMTR-MCNC: 170 MG/DL (ref 70–130)
GLUCOSE BLDC GLUCOMTR-MCNC: 91 MG/DL (ref 70–130)
HCT VFR BLD AUTO: 29.1 % (ref 37.5–51)
HGB BLD-MCNC: 9.1 G/DL (ref 13–17.7)
MAGNESIUM SERPL-MCNC: 2.1 MG/DL (ref 1.6–2.6)
MCH RBC QN AUTO: 27.1 PG (ref 26.6–33)
MCHC RBC AUTO-ENTMCNC: 31.3 G/DL (ref 31.5–35.7)
MCV RBC AUTO: 86.6 FL (ref 79–97)
PLATELET # BLD AUTO: 352 10*3/MM3 (ref 140–450)
PMV BLD AUTO: 10.6 FL (ref 6–12)
POTASSIUM BLD-SCNC: 3.7 MMOL/L (ref 3.5–5.2)
PROT SERPL-MCNC: 5.9 G/DL (ref 6–8.5)
RBC # BLD AUTO: 3.36 10*6/MM3 (ref 4.14–5.8)
SODIUM BLD-SCNC: 139 MMOL/L (ref 136–145)
WBC NRBC COR # BLD: 18.75 10*3/MM3 (ref 3.4–10.8)

## 2019-10-19 PROCEDURE — 63710000001 INSULIN LISPRO (HUMAN) PER 5 UNITS: Performed by: NURSE PRACTITIONER

## 2019-10-19 PROCEDURE — 63710000001 INSULIN DETEMIR PER 5 UNITS: Performed by: PHYSICIAN ASSISTANT

## 2019-10-19 PROCEDURE — 99233 SBSQ HOSP IP/OBS HIGH 50: CPT | Performed by: INTERNAL MEDICINE

## 2019-10-19 PROCEDURE — 0W9H3ZZ DRAINAGE OF RETROPERITONEUM, PERCUTANEOUS APPROACH: ICD-10-PCS | Performed by: RADIOLOGY

## 2019-10-19 PROCEDURE — 25010000002 ONDANSETRON PER 1 MG: Performed by: PHYSICIAN ASSISTANT

## 2019-10-19 PROCEDURE — 80053 COMPREHEN METABOLIC PANEL: CPT | Performed by: INTERNAL MEDICINE

## 2019-10-19 PROCEDURE — 82962 GLUCOSE BLOOD TEST: CPT

## 2019-10-19 PROCEDURE — 83735 ASSAY OF MAGNESIUM: CPT | Performed by: INTERNAL MEDICINE

## 2019-10-19 PROCEDURE — 85027 COMPLETE CBC AUTOMATED: CPT | Performed by: INTERNAL MEDICINE

## 2019-10-19 PROCEDURE — 25010000002 PIPERACILLIN SOD-TAZOBACTAM PER 1 G: Performed by: INTERNAL MEDICINE

## 2019-10-19 RX ADMIN — AMLODIPINE BESYLATE 10 MG: 10 TABLET ORAL at 08:31

## 2019-10-19 RX ADMIN — VANCOMYCIN HYDROCHLORIDE 125 MG: KIT at 12:29

## 2019-10-19 RX ADMIN — VANCOMYCIN HYDROCHLORIDE 125 MG: KIT at 06:26

## 2019-10-19 RX ADMIN — TAZOBACTAM SODIUM AND PIPERACILLIN SODIUM 3.38 G: 375; 3 INJECTION, SOLUTION INTRAVENOUS at 18:22

## 2019-10-19 RX ADMIN — INSULIN LISPRO 4 UNITS: 100 INJECTION, SOLUTION INTRAVENOUS; SUBCUTANEOUS at 18:22

## 2019-10-19 RX ADMIN — METOPROLOL TARTRATE 100 MG: 100 TABLET ORAL at 22:32

## 2019-10-19 RX ADMIN — INSULIN DETEMIR 20 UNITS: 100 INJECTION, SOLUTION SUBCUTANEOUS at 08:30

## 2019-10-19 RX ADMIN — METOPROLOL TARTRATE 100 MG: 100 TABLET ORAL at 08:31

## 2019-10-19 RX ADMIN — VANCOMYCIN HYDROCHLORIDE 125 MG: KIT at 18:22

## 2019-10-19 RX ADMIN — ONDANSETRON 4 MG: 2 INJECTION INTRAMUSCULAR; INTRAVENOUS at 02:01

## 2019-10-19 RX ADMIN — NYSTATIN: 100000 POWDER TOPICAL at 08:33

## 2019-10-19 RX ADMIN — INSULIN LISPRO 4 UNITS: 100 INJECTION, SOLUTION INTRAVENOUS; SUBCUTANEOUS at 12:28

## 2019-10-19 RX ADMIN — Medication 250 MG: at 08:31

## 2019-10-19 RX ADMIN — NYSTATIN: 100000 POWDER TOPICAL at 22:31

## 2019-10-19 RX ADMIN — RENAGEL 800 MG: 800 TABLET ORAL at 18:22

## 2019-10-19 RX ADMIN — TAZOBACTAM SODIUM AND PIPERACILLIN SODIUM 3.38 G: 375; 3 INJECTION, SOLUTION INTRAVENOUS at 12:27

## 2019-10-19 RX ADMIN — Medication 250 MG: at 22:31

## 2019-10-19 RX ADMIN — INSULIN LISPRO 4 UNITS: 100 INJECTION, SOLUTION INTRAVENOUS; SUBCUTANEOUS at 08:31

## 2019-10-19 RX ADMIN — SODIUM CHLORIDE 100 ML/HR: 9 INJECTION, SOLUTION INTRAVENOUS at 03:14

## 2019-10-19 RX ADMIN — RENAGEL 800 MG: 800 TABLET ORAL at 08:38

## 2019-10-19 RX ADMIN — HYDROCODONE BITARTRATE AND ACETAMINOPHEN 1 TABLET: 10; 325 TABLET ORAL at 22:42

## 2019-10-19 RX ADMIN — INSULIN DETEMIR 20 UNITS: 100 INJECTION, SOLUTION SUBCUTANEOUS at 22:34

## 2019-10-19 RX ADMIN — SODIUM CHLORIDE 1000 ML: 9 INJECTION, SOLUTION INTRAVENOUS at 02:15

## 2019-10-19 RX ADMIN — DULOXETINE HYDROCHLORIDE 30 MG: 30 CAPSULE, DELAYED RELEASE ORAL at 08:38

## 2019-10-19 RX ADMIN — TERAZOSIN HYDROCHLORIDE ANHYDROUS 5 MG: 5 CAPSULE ORAL at 22:32

## 2019-10-19 RX ADMIN — RENAGEL 800 MG: 800 TABLET ORAL at 12:27

## 2019-10-19 NOTE — PLAN OF CARE
Problem: Patient Care Overview  Goal: Plan of Care Review  Outcome: Ongoing (interventions implemented as appropriate)   10/19/19 1005   Plan of Care Review   Progress no change   OTHER   Outcome Summary Patient presents with a small ulceration area of unknown origin on his right lower lateral ankle. Wound presented with loose slough. Wound irrigated and debrided showing a clean moist wound bed. Will treat with Hydrofera blue every other day wound dressing changes. POC discussed with patient. Left extra Hydrofera blue in patients room. Next change per staff, is due on Monday AM. Will continue to follow at this time. Thanks    Coping/Psychosocial   Plan of Care Reviewed With patient

## 2019-10-19 NOTE — PROGRESS NOTES
[facility] Medicine Services  PROGRESS NOTE    Patient Name: Kendrick Crystal  : 1970  MRN: [id number]    Date of Admission: 10/8/2019  Primary Care Physician: No primary care provider on file.    Subjective   Subjective     CC: f/u abscess    HPI: Patient up in bed with spouse in room. Has not been OOB. Denies pain.     Review of Systems  Gen- No fevers, chills  CV- No chest pain, palpitations  Resp- No cough, dyspnea  GI- No N/V/D, abd pain      Objective   Objective     Vital Signs:   Temp:  [98 °F (36.7 °C)-98.5 °F (36.9 °C)] 98.5 °F (36.9 °C)  Heart Rate:  [69-73] 69  Resp:  [16] 16  BP: ()/(58-94) 150/92        Physical Exam:  Constitutional: No acute distress, awake, alert, chronically ill appearing  HENT: NCAT, mucous membranes moist  Respiratory: Clear to auscultation bilaterally, respiratory effort normal   Cardiovascular: RRR, no murmurs, rubs, or gallops, palpable pedal pulses bilaterally  Gastrointestinal: Positive bowel sounds, soft, nontender, nondistended  Musculoskeletal: Left BKA stump intact with prosthesis at bedside.  Psychiatric: Appropriate affect, cooperative  Neurologic: Oriented x 3, strength symmetric in all extremities, Cranial Nerves grossly intact to confrontation, speech clear  Skin: No rashes    Results Reviewed:    Results from last 7 days   Lab Units 10/19/19  0749 10/18/19  0953 10/17/19  0545   WBC 10*3/mm3 18.75* 13.12* 15.48*   HEMOGLOBIN g/dL 9.1* 8.9* 8.9*   HEMATOCRIT % 29.1* 28.6* 28.8*   PLATELETS 10*3/mm3 352 358 339     Results from last 7 days   Lab Units 10/19/19  0749 10/18/19  0953 10/16/19  0625   SODIUM mmol/L 139 137 134*   POTASSIUM mmol/L 3.7 3.6 3.6   CHLORIDE mmol/L 110* 109* 100   CO2 mmol/L 20.0* 21.0* 25.0   BUN mg/dL 14 14 16   CREATININE mg/dL 0.50* 0.51* 0.52*   GLUCOSE mg/dL 96 147* 170*   CALCIUM mg/dL 7.9* 7.5* 7.6*   ALT (SGPT) U/L 13 16 16   AST (SGOT) U/L 13 20 17     Estimated Creatinine Clearance: 236.1  mL/min (A) (by C-G formula based on SCr of 0.5 mg/dL (L)).    Microbiology Results Abnormal     Procedure Component Value - Date/Time    Fungus Culture - Body Fluid, Peritoneum [[id number]] Collected:  10/09/19 1508    Lab Status:  Preliminary result Specimen:  Body Fluid from Peritoneum Updated:  10/16/19 1704     Fungus Culture No fungus isolated at 1 week    Anaerobic Culture - Body Fluid, Peritoneum [[id number]] Collected:  10/09/19 1508    Lab Status:  Final result Specimen:  Body Fluid from Peritoneum Updated:  10/14/19 0935     Anaerobic Culture No anaerobes isolated at 5 days    Urine Culture - Urine, Urine, Clean Catch [[id number]]  (Abnormal)  (Susceptibility) Collected:  10/08/19 1098    Lab Status:  Final result Specimen:  Urine, Clean Catch Updated:  10/12/19 0931     Urine Culture >100,000 CFU/mL Klebsiella pneumoniae ssp pneumoniae      >100,000 CFU/mL Escherichia coli    Susceptibility      Klebsiella pneumoniae ssp pneumoniae     EYAD     Ampicillin Resistant     Ampicillin + Sulbactam Susceptible     Cefazolin Susceptible     Cefepime Susceptible     Ceftazidime Susceptible     Ceftriaxone Susceptible     Gentamicin Susceptible     Levofloxacin Susceptible     Nitrofurantoin Intermediate     Piperacillin + Tazobactam Susceptible     Tetracycline Susceptible     Trimethoprim + Sulfamethoxazole Susceptible                Susceptibility      Escherichia coli     EYAD     Ampicillin Resistant     Ampicillin + Sulbactam Intermediate     Cefazolin Susceptible     Cefepime Susceptible     Ceftazidime Susceptible     Ceftriaxone Susceptible     Gentamicin Susceptible     Levofloxacin Resistant     Nitrofurantoin Susceptible     Piperacillin + Tazobactam Susceptible     Tetracycline Susceptible     Trimethoprim + Sulfamethoxazole Susceptible                    Body Fluid Culture - Body Fluid, Peritoneum [[id number]]  (Abnormal)  (Susceptibility) Collected:  10/09/19 1508    Lab Status:  Final result Specimen:   Body Fluid from Peritoneum Updated:  10/11/19 0621     Body Fluid Culture Moderate growth (3+) Klebsiella pneumoniae ssp pneumoniae     Gram Stain Many (4+) WBCs per low power field      Moderate (3+) Gram negative bacilli    Susceptibility      Klebsiella pneumoniae ssp pneumoniae     EYAD     Ampicillin Resistant     Ampicillin + Sulbactam Susceptible     Cefazolin Susceptible     Cefepime Susceptible     Ceftazidime Susceptible     Ceftriaxone Susceptible     Gentamicin Susceptible     Levofloxacin Susceptible     Piperacillin + Tazobactam Susceptible     Trimethoprim + Sulfamethoxazole Susceptible                    Blood Culture - Blood, Arm, Right [[id number]]  (Abnormal) Collected:  10/08/19 2012    Lab Status:  Final result Specimen:  Blood from Arm, Right Updated:  10/10/19 2122     Blood Culture Klebsiella pneumoniae ssp pneumoniae     Isolated from Aerobic and Anaerobic Bottles     Gram Stain Aerobic Bottle Gram negative bacilli      Anaerobic Bottle Gram negative bacilli    Narrative:       Refer to blood culture collected 10/8/2019 at 2000 for MICs.    Blood Culture - Blood, Arm, Left [[id number]]  (Abnormal)  (Susceptibility) Collected:  10/08/19 2000    Lab Status:  Final result Specimen:  Blood from Arm, Left Updated:  10/10/19 2122     Blood Culture Klebsiella pneumoniae ssp pneumoniae     Isolated from Aerobic and Anaerobic Bottles     Gram Stain Anaerobic Bottle Gram negative bacilli      Aerobic Bottle Gram negative bacilli    Susceptibility      Klebsiella pneumoniae ssp pneumoniae     EYAD     Ampicillin Resistant     Ampicillin + Sulbactam Susceptible     Cefazolin Susceptible     Cefepime Susceptible     Ceftazidime Susceptible     Ceftriaxone Susceptible     Gentamicin Susceptible     Levofloxacin Susceptible     Piperacillin + Tazobactam Susceptible     Trimethoprim + Sulfamethoxazole Susceptible                    Blood Culture ID, PCR - Blood, Arm, Left [[id number]]  (Abnormal) Collected:   10/08/19 2000    Lab Status:  Final result Specimen:  Blood from Arm, Left Updated:  10/09/19 1038     BCID, PCR Klebsiella pneumoniae. Identification by BCID PCR.        Personally reviewed CT A/P with persistent right renal fluid collection. Agree with interpretation.  Imaging Results (last 24 hours)     Procedure Component Value Units Date/Time    CT Abdomen Pelvis With Contrast [[id number]] Collected:  10/18/19 1452     Updated:  10/18/19 1730    Narrative:       EXAMINATION: CT ABDOMEN AND PELVIS WITH CONTRAST-10/18/2019:      INDICATION: Reassess perinephric abscess and rule out other  intraabdominal pathology; N15.1-Renal and perinephric abscess;  A41.9-Sepsis, unspecified organism; E86.0-Dehydration; E11.65-Type 2  diabetes mellitus with hyperglycemia; I10-Essential (primary)  hypertension; Z86.14-Personal history of Methicillin resistant  Staphylococcus aureus infection; K74.60-Unspecified cirrhosis of liver;  Z74.09-Other reduced m.      TECHNIQUE: CT abdomen and pelvis with intravenous contrast.     The radiation dose reduction device was turned on for each scan per the  ALARA (As Low as Reasonably Achievable) protocol.     COMPARISON: CT dated 10/09/2019.     FINDINGS: The lung bases demonstrate numerous nodular densities within  the bilateral lung bases some of which are calcified, however, the  majority of which are noncalcified measuring up to 13 mm right lower  lobe medial segment which has central area of potential lucency and  developing cavitation along with a separate site in the more superior  right lower lobe raising suspicion for infectious or inflammatory  process along with trace bilateral pleural effusions, right greater than  left.     Liver without focal lesion. Gallbladder unremarkable. No biliary  dilatation. Pancreas and spleen unremarkable. Adrenals demonstrate  stable left adrenal nodule. Kidneys without hydronephrosis or  hydroureter demonstrating a grossly stable appearing fluid  collection of  likely subcapsular/perinephric location inferior pole right kidney  without significant change from prior. No gas containing within this  fluid collection or new soft tissue enhancement/nodularity. GI tract  without focal thickening or disproportionate dilatation of bowel. No new  focal fluid collection or abscess is evident. Pelvic viscera  unremarkable. Degenerative disease of the spine without aggressive  osseous lesion. Mild body wall edema.       Impression:       1.  Grossly stable appearance of perinephric/subcapsular fluid  associated with the right kidney without evidence for progressive  nodularity/enhancement or gas contained within this collection from  prior comparison.  2.  No new focal fluid collection or loculated fluid collection within  the abdomen.  3.  The lung bases demonstrate numerous bilateral pulmonary nodules  several of which were present on prior examinations including largest  medial segment right lower lobe with now trace bilateral pleural  effusions. Some of these have foci of central lucency concerning for  cavitary components of infectious or inflammatory process.     D:  10/18/2019  E:  10/18/2019     This report was finalized on 10/18/2019 5:27 PM by Dr. Jb Campos.             Results for orders placed during the hospital encounter of 05/18/19   Adult Transthoracic Echo Complete W/ Cont if Necessary Per Protocol    Narrative · Estimated EF = 60%.  · Mild mitral valve regurgitation is present  · Mild tricuspid valve regurgitation is present.  · Calculated right ventricular systolic pressure from tricuspid   regurgitation is 29 mmHg.          I have reviewed the medications:  Scheduled Meds:  amLODIPine 10 mg Oral Q24H   DULoxetine 30 mg Oral Daily   insulin detemir 20 Units Subcutaneous Q12H   insulin lispro 0-9 Units Subcutaneous 4x Daily With Meals & Nightly   insulin lispro 4 Units Subcutaneous TID With Meals   metoprolol tartrate 100 mg Oral Q12H   nystatin   Topical Q12H   piperacillin-tazobactam 3.375 g Intravenous Once   piperacillin-tazobactam 3.375 g Intravenous Q8H   saccharomyces boulardii 250 mg Oral BID   sevelamer 800 mg Oral TID With Meals   sodium chloride 10 mL Intravenous Q12H   sodium chloride 10 mL Intravenous Q12H   terazosin 5 mg Oral Nightly   vancomycin 125 mg Oral Q6H     Continuous Infusions:  sodium chloride 100 mL/hr Last Rate: 100 mL/hr (10/19/19 0314)     PRN Meds:.•  acetaminophen **OR** acetaminophen **OR** acetaminophen  •  dextrose  •  dextrose  •  glucagon (human recombinant)  •  HYDROcodone-acetaminophen  •  magnesium sulfate **OR** magnesium sulfate **OR** magnesium sulfate  •  melatonin  •  ondansetron  •  potassium chloride **OR** potassium chloride **OR** potassium chloride  •  sodium chloride  •  sodium chloride  •  witch hazel-glycerin      Assessment/Plan   Assessment / Plan     Active Hospital Problems    Diagnosis  POA   • **Perinephric abscess [N15.1]  Yes   • Bacteremia due to Klebsiella pneumoniae [R78.81]  Unknown   • Acute UTI (urinary tract infection) [N39.0]  Yes   • Gastroparesis [K31.84]  Yes   • S/P BKA (below knee amputation), left (CMS/HCC) [Z89.512]  Not Applicable   • Type 2 diabetes mellitus with circulatory disorder and uncontrolled [E11.59]  Yes   • DUNLAP Cirrhosis [K74.60]  Yes   • Essential hypertension [I10]  Yes   • Hyperlipemia [E78.5]  Yes   • Non-compliance [Z91.14]  Not Applicable      Resolved Hospital Problems   No resolved problems to display.        Brief Hospital Course to date:  Kendrick Crystal is a 49 y.o. male  50 yo gentleman new patient for me today, hx of T2DM, hypertension, hx of multiple skin/abscesses/MRSA infection, DUNLAP cirrhosis, PVD s/p L BKA, and  presented with sepsis 2/2 renal and perinephric abscess s/p IR guided drainage of abscess culture growing Klebsiella pneumoniae. Given persistent WBC elevation and findings on CT, broadened to IV zosyn 10/19. This is my first day assessing active  issues.    A/P:     Sepsis 2/2 perinephric abscess and UTI, Klebsiella pneuomniae bacteremia  Persistent leukocytosis  -I have reviewed his cultures from peritoneum and blood cultures from 10/10 growing Klebsiella resistant to ampicillin   -D/W Dr. Dong this am. Broadening abx to zosyn this am. Also with hx of c diff on PO vancomycin while on ABX with stop date 10/29  -Urology has seen, pt s/p cystoscopy 10/16 to evaluate for hematuria and assess for poss fistula from bowel to bladder. Given persistent abscess on CT we will need to discuss with them on Monday whether or not drainage of this is recommended.     Hx of C diff with intermittent loose stool: continue vancomycin as stated above (end date 10/29/2019)  --per staff, stool slightly thickening.  Monitor.    --am labs, mag     HTN:   --I reviewed blood pressure from 10/18 with isolated episode of hypotension which appears to be likely false reading given elevation this am. I will continue lopressor 100 mg q12h and amlodipine 10 mg q24h      T2DM poorly controlled  --I reviewed his blood glucose over past 24 hours showing excellent control. Continue current insulin regimen.       Weakness  --Patient continues to refuse PT/OT and refuse SNF. Long discussion with he and his wife reinforcing need to start participating in his care prior to d/c.     DVT Prophylaxis:  Mechanical      Disposition: I expect the patient to be discharged next week.    CODE STATUS:   Code Status and Medical Interventions:   Ordered at: 10/08/19 6463     Level Of Support Discussed With:    Patient     Code Status:    CPR     Medical Interventions (Level of Support Prior to Arrest):    Full         Electronically signed by Eugenia Elizondo II, DO, 10/19/19, 12:05 PM.

## 2019-10-19 NOTE — PLAN OF CARE
Problem: Patient Care Overview  Goal: Plan of Care Review  Outcome: Ongoing (interventions implemented as appropriate)   10/19/19 1005 10/19/19 1622   Plan of Care Review   Progress no change --    Coping/Psychosocial   Plan of Care Reviewed With --  patient;spouse     Goal: Discharge Needs Assessment  Outcome: Ongoing (interventions implemented as appropriate)   10/09/19 1055 10/17/19 1650   Discharge Needs Assessment   Readmission Within the Last 30 Days --  no previous admission in last 30 days;lack of support   Concerns Comments --  pt is non compliant   Patient/Family Anticipates Transition to --  home with family   Patient/Family Anticipated Services at Transition --     Transportation Concerns --  car, none   Transportation Anticipated --  family or friend will provide   Equipment Needed After Discharge none --    Offered/Gave Vendor List --  no   Current Discharge Risk --  chronically ill;physical impairment   Disability   Equipment Currently Used at Home bath bench;wheelchair;prosthesis;wheelchair, motorized;commode;grab bar --        Problem: Fall Risk (Adult)  Goal: Absence of Fall  Outcome: Ongoing (interventions implemented as appropriate)   10/17/19 1650   Fall Risk (Adult)   Absence of Fall making progress toward outcome       Problem: Skin Injury Risk (Adult)  Goal: Skin Health and Integrity  Outcome: Ongoing (interventions implemented as appropriate)   10/17/19 1650   Skin Injury Risk (Adult)   Skin Health and Integrity making progress toward outcome       Problem: Pain, Acute (Adult)  Goal: Acceptable Pain Control/Comfort Level  Outcome: Ongoing (interventions implemented as appropriate)   10/17/19 1650   Pain, Acute (Adult)   Acceptable Pain Control/Comfort Level achieves outcome       Problem: Infection, Risk/Actual (Adult)  Goal: Identify Related Risk Factors and Signs and Symptoms  Outcome: Outcome(s) achieved Date Met: 10/19/19   10/17/19 1650   Infection, Risk/Actual (Adult)    Related Risk Factors (Infection, Risk/Actual) chronic illness/condition;exposure to microbes;medication effects;skin integrity impairment;treatment plan   Signs and Symptoms (Infection, Risk/Actual) blood glucose changes;edema;lab value changes     Goal: Infection Prevention/Resolution  Outcome: Ongoing (interventions implemented as appropriate)   10/19/19 1015   Infection, Risk/Actual (Adult)   Infection Prevention/Resolution making progress toward outcome

## 2019-10-19 NOTE — PROGRESS NOTES
[facility] Progress Note        Antibiotics:  Anti-Infectives (From admission, onward)    Ordered     Dose/Rate Route Frequency Start Stop    10/15/19 1127  vancomycin oral solution reconstituted 125 mg     Jeyson Neely [facility] reviewed the order on 10/17/19 1241.   Ordering Provider:  Usman Dong MD    125 mg Oral Every 6 Hours Scheduled 10/15/19 1215 10/29/19 1159    10/12/19 1339  cefTRIAXone (ROCEPHIN) 2 g/100 mL 0.9% NS VTB (EVENS)     Ordering Provider:  Ayad Blount MD    2 g  over 30 Minutes Intravenous Every 24 Hours 10/12/19 1500 10/22/19 1459    10/09/19 1136  meropenem (MERREM) 1 g/100 mL 0.9% NS VTB (mbp)     Ordering Provider:  Usman Dong MD    1 g  over 30 Minutes Intravenous Once 10/09/19 1230 10/09/19 1251    10/08/19 2338  piperacillin-tazobactam (ZOSYN) 3.375 g in iso-osmotic dextrose 50 ml (premix)     Ordering Provider:  Yasmany Martinez PA-C    3.375 g  over 30 Minutes Intravenous Once 10/08/19 2340 10/09/19 0016          CC: fatigue    HPI:  Patient is a 49 y.o.  Yr old male with history of poorly contolled T2DM, DUNLAP/liver cirrhosis, HTN, PVD/Left BKA, and multiple skin abscesses/MRSA who presented to [facility] ED with right shin wound infection summer 2018.  He was unable to get to see Dr. Martinez as his father passed away unexpectedly on 18 and he had to wait until after the .  The wound worsened becoming gangrenous and he came to [facility] ED in 2018.  He also had been hospitalized at [facility] and then transferred to  for a severe penis/scrotum infection requiring surgical debridement in summer 2018.  He received antibiotics regarding his right leg infection, generally improved over the remainder of summer 2018 but that area had not completely healed. He was admitted 2019 with acute left lower abdominal wall redness/swelling and pain, spontaneous drainage associated with fever/chills and uncontrolled blood sugars.   I&D requiring wide  debridement , severe/deep infection and transferred to [facility] on May 3 with IV Zosyn/oral doxycycline. Cultures had lactobacillus and mixed gram-positive skin sherly including alpha strep, staph epidermidis and corynebacterium. Readmitted to [facility] May 18, 2019, increasing generalized edema ultimately transitioned to oral doxycycline and healed.     He presented to the emergency room July 30, 2019 with acute rectal pain that had begun 7/27; this is associated with constipation for days, chills and nausea/dry heaving.  White blood cell count elevated, high hemoglobin A1c over 13, urinalysis with hematuria/pyuria and CT scan with 3 x 3 cm fluid collection anterior to the distal rectum and per discussion with Dr. Akbar/Jennifer, this is not in the prostate.  Colorectal surgery/urology saw him for consideration of surgical options/timing/threshold, etc..     8/2/19 I&Dper Dr Payne perirectal abscess; patient reports that he had instrumented his rectum prior to hospitalization with enema     8/3/19 urinary retention overnight requiring in/out catheterization per patient.  Creat climb     8/4/19 further creat climb; childs placed and lisinopril stopped and d/w Dr Rubio;  ?obstruction initially associated with retention postop (1200 out with I/O cath postop per nursing) -v- contrast from CT -v- lisinopril/medication/vancomycin -v- relative hypotension -v- likely combination of factors with ATN; nephrology following; vancomycin trough high and vancomycin stopped empirically 8/3 although high trough potentially accumulation as a consequence of diminishing renal function associated with retention/contrast/pressure rather than cause.  Nonetheless, avoid for now; creatinine trending down.        8/7 in retrospect he remembers air in his urine at admit which has raised concern for possible fistula from urinary tract;  No fecaluria and culture from abscess is fecal sherly;  ?rectal-urethral fistula;  Dr Payne  "aware     Culture with E. coli/Klebsiella species and creatinine generally trended down, transitioned to oral antibiotics at discharge.     Readmitted August 17, 2019 with nausea/vomiting/dry heaves for 3 days.  Walker catheter was placed and CT scan of the abdomen showed:      \"Previously seen fluid collection identified inferior to the prostate gland is more increased in density on today's examination. There is wall thickening again seen throughout the bladder. Findings again are concerning for cystitis.\" per radiology     He was transitioned to oral omnicef/topical antifungal on approx 8/23/19; Noncompliant with f/u in our office     READMITTED 10/8/19 RLQ abd pain, urinary retention, nausea, dysuria and generalized fatigue     UTI by U/A, subsequent blood cultures positive for GNR and initial PCR with kleb sp, no carbapenem resistance by initial PCR per my d/w micro;  Abnormal CT abd with right perinephric fluid collection, advanced cirrhosis, urinary bladder thickening.  10/9 Aspirate of perinephric fluid collection consistent with abscess/gram-negative lon and white blood cells     10/16/19 cytoscopy with bullous change per Dr Gutierres but no fistula per him    10/19/19 intermittent nausea, no vomiting or dry heaves this am, intermittent dry cough; denies new pain;  intermittent diarrhea but thickening; He currently denies abd pain; no overt hematuria or pyuria;  He denied any  fecaluria prior to admit to me.       No headache photophobia or neck stiffness.  No shortness of breath or hemoptysis.   No hematochezia melena or hematemesis.  No skin rash.     ROS:      10/19/19 No f/c/s. No n/v/d. No rash. No new ADR to Abx.     Constitutional-- No Fever, chills or sweats.  Appetite diminished with fatigue  Heent-- No new vision, hearing or throat complaints.  No epistaxis or oral sores.  Denies odynophagia or dysphagia.  No flashers, floaters or eye pain. No odynophagia or dysphagia. No headache, photophobia or neck " "stiffness.  CV-- No chest pain, palpitation or syncope  Resp-- as above  GI-  No hematochezia, melena, or hematemesis. Denies jaundice.  -- No dysuria, hematuria, or flank pain.  Denies hesitancy, urgency or flank pain.  Lymph- no swollen lymph nodes in neck/axilla or groin.   Heme- No active bruising or bleeding; no Hx of DVT or PE.  MS-- no swelling or pain in the bones or joints of arms/legs.  No new back pain.  Neuro-- No acute focal weakness or numbness in the arms or legs.  No seizures.     Full 12 point review of systems reviewed and negative otherwise for acute complaints, except for above      PE:   /92 (BP Location: Left arm, Patient Position: Lying)   Pulse 69   Temp 98.5 °F (36.9 °C) (Oral)   Resp 16   Ht 190.5 cm (75\")   Wt 112 kg (246 lb)   SpO2 96%   BMI 30.75 kg/m²     GENERAL: awake, in no acute distress.   HEENT: Normocephalic, atraumatic.  PERRL. EOMI. No conjunctival injection. No icterus. Oropharynx clear without evidence of thrush or exudate. No evidence of peridontal disease.    NECK: Supple without nuchal rigidity. No mass.  LYMPH: No cervical, axillary or inguinal lymphadenopathy.  HEART: RRR; No murmur, rubs, gallops.   LUNGS: Diminished at bases bilaterally with scattered rhonchi. Normal respiratory effort. Nonlabored. No dullness.  ABDOMEN: Soft, nontender, nondistended. Positive bowel sounds. No rebound or guarding. NO mass or HSM.  EXT:  No cyanosis, clubbing or edema. No cord.  : no new findings; +childs  MSK: FROM without joint effusions noted arms/legs.    SKIN: Warm and dry without cutaneous eruptions on Inspection/palpation.    NEURO: awake     Amputee status noted with left BKA     No CVA tenderness     No peripheral stigmata/phenomena of endocarditis    Laboratory Data    Results from last 7 days   Lab Units 10/19/19  0749 10/18/19  0953 10/17/19  0545   WBC 10*3/mm3 18.75* 13.12* 15.48*   HEMOGLOBIN g/dL 9.1* 8.9* 8.9*   HEMATOCRIT % 29.1* 28.6* 28.8*   PLATELETS " 10*3/mm3 352 358 339     Results from last 7 days   Lab Units 10/19/19  0749   SODIUM mmol/L 139   POTASSIUM mmol/L 3.7   CHLORIDE mmol/L 110*   CO2 mmol/L 20.0*   BUN mg/dL 14   CREATININE mg/dL 0.50*   GLUCOSE mg/dL 96   CALCIUM mg/dL 7.9*     Results from last 7 days   Lab Units 10/19/19  0749   ALK PHOS U/L 211*   BILIRUBIN mg/dL <0.2*   ALT (SGPT) U/L 13   AST (SGOT) U/L 13               Estimated Creatinine Clearance: 236.1 mL/min (A) (by C-G formula based on SCr of 0.5 mg/dL (L)).      Microbiology:      Radiology:  Imaging Results (last 72 hours)     Procedure Component Value Units Date/Time    CT Guided Abscess Drain Retroperitoneal [[id number]] Collected:  10/09/19 1611     Updated:  10/09/19 1715    Narrative:       EXAMINATION: CT GUIDED ABSCESS DRAIN RETROPERITONEAL- 10/09/2019     INDICATION: N15.1-Renal and perinephric abscess; A41.9-Sepsis,  unspecified organism; E86.0-Dehydration; E11.65-Type 2 diabetes mellitus  with hyperglycemia; I10-Essential (primary) hypertension;  Z86.14-Personal history of Methicillin resistant Staphylococcus aureus  infection; K74.60-Unspecified cirrhosis of liver; fluid collection     TECHNIQUE: CT-guided drainage of a perinephric fluid collection     The radiation dose reduction device was turned on for each scan per the  ALARA (As Low as Reasonably Achievable) protocol.     COMPARISON: NONE     FINDINGS: Patient referred for CT-guided drainage of a perinephric fluid  collection. Procedure and risks were explained to the patient. Informed  consent was obtained and signed. Patient placed in the left lateral  position upon the table. The right flank was prepped and draped in a  sterile fashion. 1% lidocaine used as a local anesthetic. Under CT  fluoroscopic guidance an 18-gauge 15 cm Chiba needle was advanced into  the perinephric fluid collection of approximately 10 ml of a reddish  fluid was removed with some debris within the fluid. There is a striated  delayed nephrogram  seen of the right kidney suggesting possible  pyelonephritis with surrounding stranding of the perinephric fat.  Myelolipoma seen on the adrenal gland on the left. Chiba needle was  removed and no immediate complication occurred. Slight decrease seen in  size of the perinephric fluid collection status post drainage.       Impression:       CT-guided drainage of a perinephric fluid collection with no  immediate complication.     D:  10/09/2019  E:  10/09/2019     This report was finalized on 10/9/2019 5:12 PM by Dr. Nella Enriquez MD.       CT Abdomen Pelvis With Contrast [[id number]] Collected:  10/08/19 2202     Updated:  10/08/19 2222    Narrative:       CT ABDOMEN AND PELVIS WITH CONTRAST, 10/8/2019    HISTORY:  49-year-old male in the ED complaining of 2 day history right side abdomen pain, nausea, vomiting and generalized weakness. History of hepatic cirrhosis (DUNLAP). History of diabetes with left BKA and multiple abdominal wall abscesses and lower extremity  cellulitis in 2018.    TECHNIQUE:  CT imaging of the abdomen and pelvis with IV contrast. Radiation dose reduction techniques included automated exposure control. Radiation audit for CT and nuclear cardiology exams in the last 12 months: 7.    COMPARISON:  *  CT abdomen/pelvis, 8/18/2019.    ABDOMEN FINDINGS:  Images show a right lower perinephric fluid collection that is either subcapsular or pericapsular. This extends along the lower kidney for a length of about 8.5 cm and has a maximum thickness of 2.5 cm. The fluid is mixed attenuation and may be  hemorrhagic. Correlate for any recent history of blunt trauma to the flank. Renal parenchyma shows no disruption or abnormal enhancement. There is no fracture of overlying right lower posterior ribs or transverse spinous processes. Given the history,  perinephric abscess is possible but is significantly less likely given normal renal parenchymal enhancement. No evidence of urinary obstruction. No visible  nephrolithiasis.    Advanced hepatic cirrhosis. Mild splenomegaly. No suspicious liver lesion. No upper abdominal ascites. Hepatic vasculature appears patent. No gallbladder distention or bile duct dilatation. Negative pancreas.    Benign left adrenal myelolipoma measuring about 2.9 cm. Normal caliber abdominal aorta. Small bowel and colon are normal in caliber and appearance, as imaged. The appendix is normal. No gastric distention or distal esophageal dilatation.    PELVIS FINDINGS:  Air within the urinary bladder, likely iatrogenic. There is at least mild diffuse bladder wall thickening. Prostate and rectum are unremarkable. No inguinal hernia.    Lung base images show multiple benign calcified granulomas.      Impression:       1.  Small mixed attenuation right perinephric fluid collection along the inferior capsule, not present on the prior exam. Perinephric hematoma is a consideration. Correlate clinically for any history of blunt flank trauma. While abscess is possible, this  is unlikely as right renal parenchyma enhances normally with no evidence of pyelonephritis. Laboratory correlation recommended.  2.  Advanced hepatic cirrhosis. Splenomegaly.  3.  Urinary bladder wall thickening. Gas within the bladder is most likely iatrogenic.  4.  Benign left adrenal myelolipoma.    Signer Name: Fadi Healy MD   Signed: 10/8/2019 10:02 PM   Workstation Name: [id number]    [facility]    XR Chest PA & Lateral [[id number]] Collected:  10/08/19 2210     Updated:  10/08/19 2212    Narrative:       CHEST X-RAY, 10/8/2019      HISTORY:    49-year-old male in the ED with reported diabetic ketoacidosis.      TECHNIQUE:  AP portable upright chest series.      FINDINGS:  Heart size and pulmonary vascularity are normal. The lungs are expanded and clear. No visible pulmonary infiltrate or pleural effusion. Lower lung benign calcified granulomas.      Impression:       Negative chest.    Signer  Name: Fadi Healy MD   Signed: 10/8/2019 10:10 PM   Workstation Name: [email or web address]    [facility]            Impression:      --Acute Kleb septicemia/sepsis, likely urinary source/pyelnoephritis associated with urinary retention and  with abnormal CT scan with perinephric collection/abscess and Kleb on aspirate;  IV zosyn ongoing; urology to determine any timing/option or threshold for further intervention or functional/anatomic assessment.    --Acute perinephric abscess as above;  Likely to need repeat imaging to assess for progress -v- other sequelae; d/w medicine regarding timing    --abnormal imaging with dry cough and ?evolving pulm infection;  At risk for aspiration and empiric zosyn started;  If productive cough, then send for culture; septic pulm emboli with kleb less likely    --acute loose stools/intermittent diarrhea; with Hx CDiff per him/family;  Empiric oral vancomycin added with high risk for relapse     --Hx perirectal abscess ; Colorectal surgery, Dr. Payne previously saw and is following.  Drainage as above on August 2 with mxed Fecal sherly in culture; CT scans  with no mention of abscess on 8/18 study;  Dr Payne  following given urology conern for fistula and to help determine any timing/option or threshold for further GI/colorectal work-up     --History urinary retention.  urology following     --Hx ARF in August 2019;  ?obstruction initially associated with retention postop (1200 out with I/O cath postop per nursing) -v- contrast from CT -v- lisinopril/medication/vancomycin -v- relative hypotension -v- likely combination of factors with ATN;  vancomycin trough high and vancomycin stopped empirically 8/3 although high trough potentially accumulation as a consequence of diminishing renal function associated with retention/contrast/pressure rather than cause.  Nonetheless, avoid for now; likely further acute kidney injury at admission August 17 associated with  dehydration/prerenal/ATN etiology     --History MRSA;  Not in current culture     --Diabetes mellitus type 2, uncontrolled.  He reports the reason for this is forgetfulness     --History Johnston and chronic liver disease/cirrhosis  per past notes     --Left BKA     --Peripheral vascular disease     --medical noncompliance    PLAN:     --IV zosyn/oral vancomycin      --Check/review labs cultures and scans     --Discussed with microbiology     --History per nursing staff     --Discussed with Dr Payne     --Discussed with family, partial history per them     --Highly complex set of issues with high risk for further serious morbidity and other serious sequela     --Colorectal surgery and urology to follow    -- D/w Dr Elizondo    **intermittent nausea/dry heaves 10/18;  abd exam benign;  S/p repeat CT with persistent collection that may require repeat aspiration/drainage;  Empiric broadening of abx as above with aspiration risk      Usman Dong MD  10/19/2019

## 2019-10-20 LAB
ALBUMIN SERPL-MCNC: 2 G/DL (ref 3.5–5.2)
ALBUMIN/GLOB SERPL: 0.5 G/DL
ALP SERPL-CCNC: 197 U/L (ref 39–117)
ALT SERPL W P-5'-P-CCNC: 13 U/L (ref 1–41)
ANION GAP SERPL CALCULATED.3IONS-SCNC: 7 MMOL/L (ref 5–15)
AST SERPL-CCNC: 17 U/L (ref 1–40)
BILIRUB SERPL-MCNC: <0.2 MG/DL (ref 0.2–1.2)
BUN BLD-MCNC: 11 MG/DL (ref 6–20)
BUN/CREAT SERPL: 23.4 (ref 7–25)
CALCIUM SPEC-SCNC: 7.7 MG/DL (ref 8.6–10.5)
CHLORIDE SERPL-SCNC: 107 MMOL/L (ref 98–107)
CO2 SERPL-SCNC: 24 MMOL/L (ref 22–29)
CREAT BLD-MCNC: 0.47 MG/DL (ref 0.76–1.27)
DEPRECATED RDW RBC AUTO: 47 FL (ref 37–54)
ERYTHROCYTE [DISTWIDTH] IN BLOOD BY AUTOMATED COUNT: 14.5 % (ref 12.3–15.4)
GFR SERPL CREATININE-BSD FRML MDRD: >150 ML/MIN/1.73
GLOBULIN UR ELPH-MCNC: 3.8 GM/DL
GLUCOSE BLD-MCNC: 199 MG/DL (ref 65–99)
GLUCOSE BLDC GLUCOMTR-MCNC: 102 MG/DL (ref 70–130)
GLUCOSE BLDC GLUCOMTR-MCNC: 200 MG/DL (ref 70–130)
GLUCOSE BLDC GLUCOMTR-MCNC: 203 MG/DL (ref 70–130)
GLUCOSE BLDC GLUCOMTR-MCNC: 91 MG/DL (ref 70–130)
HCT VFR BLD AUTO: 27.7 % (ref 37.5–51)
HGB BLD-MCNC: 8.4 G/DL (ref 13–17.7)
MCH RBC QN AUTO: 26.8 PG (ref 26.6–33)
MCHC RBC AUTO-ENTMCNC: 30.3 G/DL (ref 31.5–35.7)
MCV RBC AUTO: 88.5 FL (ref 79–97)
PLATELET # BLD AUTO: 338 10*3/MM3 (ref 140–450)
PMV BLD AUTO: 10.5 FL (ref 6–12)
POTASSIUM BLD-SCNC: 3.9 MMOL/L (ref 3.5–5.2)
PROT SERPL-MCNC: 5.8 G/DL (ref 6–8.5)
RBC # BLD AUTO: 3.13 10*6/MM3 (ref 4.14–5.8)
SODIUM BLD-SCNC: 138 MMOL/L (ref 136–145)
WBC NRBC COR # BLD: 16.9 10*3/MM3 (ref 3.4–10.8)

## 2019-10-20 PROCEDURE — 99232 SBSQ HOSP IP/OBS MODERATE 35: CPT | Performed by: NURSE PRACTITIONER

## 2019-10-20 PROCEDURE — 25010000002 PIPERACILLIN SOD-TAZOBACTAM PER 1 G: Performed by: INTERNAL MEDICINE

## 2019-10-20 PROCEDURE — 25010000002 ONDANSETRON PER 1 MG: Performed by: PHYSICIAN ASSISTANT

## 2019-10-20 PROCEDURE — 80053 COMPREHEN METABOLIC PANEL: CPT | Performed by: INTERNAL MEDICINE

## 2019-10-20 PROCEDURE — 82962 GLUCOSE BLOOD TEST: CPT

## 2019-10-20 PROCEDURE — 85027 COMPLETE CBC AUTOMATED: CPT | Performed by: INTERNAL MEDICINE

## 2019-10-20 PROCEDURE — 63710000001 INSULIN DETEMIR PER 5 UNITS: Performed by: PHYSICIAN ASSISTANT

## 2019-10-20 RX ADMIN — Medication 250 MG: at 22:17

## 2019-10-20 RX ADMIN — TAZOBACTAM SODIUM AND PIPERACILLIN SODIUM 3.38 G: 375; 3 INJECTION, SOLUTION INTRAVENOUS at 17:39

## 2019-10-20 RX ADMIN — VANCOMYCIN HYDROCHLORIDE 125 MG: KIT at 13:48

## 2019-10-20 RX ADMIN — SODIUM CHLORIDE 100 ML/HR: 9 INJECTION, SOLUTION INTRAVENOUS at 03:15

## 2019-10-20 RX ADMIN — TERAZOSIN HYDROCHLORIDE ANHYDROUS 5 MG: 5 CAPSULE ORAL at 22:17

## 2019-10-20 RX ADMIN — DULOXETINE HYDROCHLORIDE 30 MG: 30 CAPSULE, DELAYED RELEASE ORAL at 10:54

## 2019-10-20 RX ADMIN — INSULIN LISPRO 4 UNITS: 100 INJECTION, SOLUTION INTRAVENOUS; SUBCUTANEOUS at 10:55

## 2019-10-20 RX ADMIN — NYSTATIN: 100000 POWDER TOPICAL at 10:55

## 2019-10-20 RX ADMIN — RENAGEL 800 MG: 800 TABLET ORAL at 17:56

## 2019-10-20 RX ADMIN — Medication 250 MG: at 10:41

## 2019-10-20 RX ADMIN — INSULIN LISPRO 4 UNITS: 100 INJECTION, SOLUTION INTRAVENOUS; SUBCUTANEOUS at 17:40

## 2019-10-20 RX ADMIN — METOPROLOL TARTRATE 100 MG: 100 TABLET ORAL at 22:17

## 2019-10-20 RX ADMIN — NYSTATIN: 100000 POWDER TOPICAL at 22:18

## 2019-10-20 RX ADMIN — TAZOBACTAM SODIUM AND PIPERACILLIN SODIUM 3.38 G: 375; 3 INJECTION, SOLUTION INTRAVENOUS at 10:53

## 2019-10-20 RX ADMIN — INSULIN LISPRO 4 UNITS: 100 INJECTION, SOLUTION INTRAVENOUS; SUBCUTANEOUS at 13:49

## 2019-10-20 RX ADMIN — VANCOMYCIN HYDROCHLORIDE 125 MG: KIT at 05:50

## 2019-10-20 RX ADMIN — RENAGEL 800 MG: 800 TABLET ORAL at 13:49

## 2019-10-20 RX ADMIN — METOPROLOL TARTRATE 100 MG: 100 TABLET ORAL at 10:41

## 2019-10-20 RX ADMIN — INSULIN LISPRO 4 UNITS: 100 INJECTION, SOLUTION INTRAVENOUS; SUBCUTANEOUS at 10:54

## 2019-10-20 RX ADMIN — INSULIN DETEMIR 20 UNITS: 100 INJECTION, SOLUTION SUBCUTANEOUS at 10:42

## 2019-10-20 RX ADMIN — RENAGEL 800 MG: 800 TABLET ORAL at 10:41

## 2019-10-20 RX ADMIN — ONDANSETRON 4 MG: 2 INJECTION INTRAMUSCULAR; INTRAVENOUS at 18:42

## 2019-10-20 RX ADMIN — AMLODIPINE BESYLATE 10 MG: 10 TABLET ORAL at 10:41

## 2019-10-20 RX ADMIN — TAZOBACTAM SODIUM AND PIPERACILLIN SODIUM 3.38 G: 375; 3 INJECTION, SOLUTION INTRAVENOUS at 01:12

## 2019-10-20 RX ADMIN — INSULIN DETEMIR 20 UNITS: 100 INJECTION, SOLUTION SUBCUTANEOUS at 22:18

## 2019-10-20 RX ADMIN — VANCOMYCIN HYDROCHLORIDE 125 MG: KIT at 17:39

## 2019-10-20 RX ADMIN — VANCOMYCIN HYDROCHLORIDE 125 MG: KIT at 00:13

## 2019-10-20 RX ADMIN — INSULIN LISPRO 4 UNITS: 100 INJECTION, SOLUTION INTRAVENOUS; SUBCUTANEOUS at 13:48

## 2019-10-20 NOTE — PROGRESS NOTES
[facility] Progress Note        Antibiotics:  Anti-Infectives (From admission, onward)    Ordered     Dose/Rate Route Frequency Start Stop    10/19/19 0957  piperacillin-tazobactam (ZOSYN) 3.375 g in iso-osmotic dextrose 50 ml (premix)     Ordering Provider:  Usman Dong MD    3.375 g  over 4 Hours Intravenous Every 8 Hours 10/19/19 1700 10/29/19 1659    10/19/19 0957  piperacillin-tazobactam (ZOSYN) 3.375 g in iso-osmotic dextrose 50 ml (premix)     Ordering Provider:  Usman Dong MD    3.375 g  over 30 Minutes Intravenous Once 10/19/19 1045 10/19/19 1257    10/15/19 1127  vancomycin oral solution reconstituted 125 mg     Jeyson Neely [facility] reviewed the order on 10/17/19 1241.   Ordering Provider:  Usman Dong MD    125 mg Oral Every 6 Hours Scheduled 10/15/19 1215 10/29/19 1159    10/09/19 1136  meropenem (MERREM) 1 g/100 mL 0.9% NS VTB (mbp)     Ordering Provider:  Usman Dong MD    1 g  over 30 Minutes Intravenous Once 10/09/19 1230 10/09/19 1251    10/08/19 2338  piperacillin-tazobactam (ZOSYN) 3.375 g in iso-osmotic dextrose 50 ml (premix)     Ordering Provider:  Yasmany Martinez PA-C    3.375 g  over 30 Minutes Intravenous Once 10/08/19 2340 10/09/19 0016          CC: fatigue    HPI:  Patient is a 49 y.o.  Yr old male with history of poorly contolled T2DM, DUNLAP/liver cirrhosis, HTN, PVD/Left BKA, and multiple skin abscesses/MRSA who presented to [facility] ED with right shin wound infection summer 2018.  He was unable to get to see Dr. Martinez as his father passed away unexpectedly on 18 and he had to wait until after the .  The wound worsened becoming gangrenous and he came to [facility] ED in 2018.  He also had been hospitalized at [facility] and then transferred to  for a severe penis/scrotum infection requiring surgical debridement in summer 2018.  He received antibiotics regarding his right leg infection, generally improved over the remainder of  summer 2018 but that area had not completely healed. He was admitted April 24, 2019 with acute left lower abdominal wall redness/swelling and pain, spontaneous drainage associated with fever/chills and uncontrolled blood sugars.  4/26 I&D requiring wide debridement , severe/deep infection and transferred to [facility] on May 3 with IV Zosyn/oral doxycycline. Cultures had lactobacillus and mixed gram-positive skin sherly including alpha strep, staph epidermidis and corynebacterium. Readmitted to [facility] May 18, 2019, increasing generalized edema ultimately transitioned to oral doxycycline and healed.     He presented to the emergency room July 30, 2019 with acute rectal pain that had begun 7/27; this is associated with constipation for days, chills and nausea/dry heaving.  White blood cell count elevated, high hemoglobin A1c over 13, urinalysis with hematuria/pyuria and CT scan with 3 x 3 cm fluid collection anterior to the distal rectum and per discussion with Dr. Akbar/Jennifer, this is not in the prostate.  Colorectal surgery/urology saw him for consideration of surgical options/timing/threshold, etc..     8/2/19 I&Dper Dr Payne perirectal abscess; patient reports that he had instrumented his rectum prior to hospitalization with enema     8/3/19 urinary retention overnight requiring in/out catheterization per patient.  Creat climb     8/4/19 further creat climb; childs placed and lisinopril stopped and d/w Dr Rubio;  ?obstruction initially associated with retention postop (1200 out with I/O cath postop per nursing) -v- contrast from CT -v- lisinopril/medication/vancomycin -v- relative hypotension -v- likely combination of factors with ATN; nephrology following; vancomycin trough high and vancomycin stopped empirically 8/3 although high trough potentially accumulation as a consequence of diminishing renal function associated with retention/contrast/pressure rather than cause.  Nonetheless, avoid for  "now; creatinine trending down.        8/7 in retrospect he remembers air in his urine at admit which has raised concern for possible fistula from urinary tract;  No fecaluria and culture from abscess is fecal sherly;  ?rectal-urethral fistula;  Dr Payne aware     Culture with E. coli/Klebsiella species and creatinine generally trended down, transitioned to oral antibiotics at discharge.     Readmitted August 17, 2019 with nausea/vomiting/dry heaves for 3 days.  Walker catheter was placed and CT scan of the abdomen showed:      \"Previously seen fluid collection identified inferior to the prostate gland is more increased in density on today's examination. There is wall thickening again seen throughout the bladder. Findings again are concerning for cystitis.\" per radiology     He was transitioned to oral omnicef/topical antifungal on approx 8/23/19; Noncompliant with f/u in our office     READMITTED 10/8/19 RLQ abd pain, urinary retention, nausea, dysuria and generalized fatigue     UTI by U/A, subsequent blood cultures positive for GNR and initial PCR with kleb sp, no carbapenem resistance by initial PCR per my d/w micro;  Abnormal CT abd with right perinephric fluid collection, advanced cirrhosis, urinary bladder thickening.  10/9 Aspirate of perinephric fluid collection consistent with abscess/gram-negative lon and white blood cells     10/16/19 cytoscopy with bullous change per Dr Gutierres but no fistula per him    10/19/19 intermittent nausea, no vomiting or dry heaves this am, intermittent dry cough broadened to zosyn with ?aspiration pneumonia evolving after dry heaves/vomiting    10/20/19 cough less and dry;  No hemoptysis; denies new pain;   Much less diarrhea; He currently denies abd pain; no overt hematuria or pyuria;  He denied any  fecaluria prior to admit to me.       No headache photophobia or neck stiffness.  No shortness of breath or hemoptysis.   No hematochezia melena or hematemesis.  No skin " "rash.     ROS:      10/20/19 No f/c/s. No n/v/d. No rash. No new ADR to Abx.     Constitutional-- No Fever, chills or sweats.  Appetite diminished with fatigue  Heent-- No new vision, hearing or throat complaints.  No epistaxis or oral sores.  Denies odynophagia or dysphagia.  No flashers, floaters or eye pain. No odynophagia or dysphagia. No headache, photophobia or neck stiffness.  CV-- No chest pain, palpitation or syncope  Resp-- as above  GI-  No hematochezia, melena, or hematemesis. Denies jaundice.  -- No dysuria, hematuria, or flank pain.  Denies hesitancy, urgency or flank pain.  Lymph- no swollen lymph nodes in neck/axilla or groin.   Heme- No active bruising or bleeding; no Hx of DVT or PE.  MS-- no swelling or pain in the bones or joints of arms/legs.  No new back pain.  Neuro-- No acute focal weakness or numbness in the arms or legs.  No seizures.     Full 12 point review of systems reviewed and negative otherwise for acute complaints, except for above      PE:   /81 (BP Location: Left arm, Patient Position: Lying)   Pulse 82   Temp 97.9 °F (36.6 °C) (Oral)   Resp 18   Ht 190.5 cm (75\")   Wt 112 kg (246 lb)   SpO2 96%   BMI 30.75 kg/m²     GENERAL: awake, in no acute distress.   HEENT: Normocephalic, atraumatic.  PERRL. EOMI. No conjunctival injection. No icterus. Oropharynx clear without evidence of thrush or exudate. No evidence of peridontal disease.    NECK: Supple without nuchal rigidity. No mass.  LYMPH: No cervical, axillary or inguinal lymphadenopathy.  HEART: RRR; No murmur, rubs, gallops.   LUNGS: Diminished at bases bilaterally with scattered rhonchi. Normal respiratory effort. Nonlabored. No dullness.  ABDOMEN: Soft, nontender, nondistended. Positive bowel sounds. No rebound or guarding. NO mass or HSM.  EXT:  No cyanosis, clubbing or edema. No cord.  : no new findings; +childs  MSK: FROM without joint effusions noted arms/legs.    SKIN: Warm and dry without cutaneous " eruptions on Inspection/palpation.    NEURO: awake     Amputee status noted with left BKA     No CVA tenderness     No peripheral stigmata/phenomena of endocarditis    Laboratory Data    Results from last 7 days   Lab Units 10/20/19  0536 10/19/19  0749 10/18/19  0953   WBC 10*3/mm3 16.90* 18.75* 13.12*   HEMOGLOBIN g/dL 8.4* 9.1* 8.9*   HEMATOCRIT % 27.7* 29.1* 28.6*   PLATELETS 10*3/mm3 338 352 358     Results from last 7 days   Lab Units 10/20/19  0536   SODIUM mmol/L 138   POTASSIUM mmol/L 3.9   CHLORIDE mmol/L 107   CO2 mmol/L 24.0   BUN mg/dL 11   CREATININE mg/dL 0.47*   GLUCOSE mg/dL 199*   CALCIUM mg/dL 7.7*     Results from last 7 days   Lab Units 10/20/19  0536   ALK PHOS U/L 197*   BILIRUBIN mg/dL <0.2*   ALT (SGPT) U/L 13   AST (SGOT) U/L 17               Estimated Creatinine Clearance: 251.2 mL/min (A) (by C-G formula based on SCr of 0.47 mg/dL (L)).      Microbiology:      Radiology:  Imaging Results (last 72 hours)     Procedure Component Value Units Date/Time    CT Guided Abscess Drain Retroperitoneal [[id number]] Collected:  10/09/19 1611     Updated:  10/09/19 1715    Narrative:       EXAMINATION: CT GUIDED ABSCESS DRAIN RETROPERITONEAL- 10/09/2019     INDICATION: N15.1-Renal and perinephric abscess; A41.9-Sepsis,  unspecified organism; E86.0-Dehydration; E11.65-Type 2 diabetes mellitus  with hyperglycemia; I10-Essential (primary) hypertension;  Z86.14-Personal history of Methicillin resistant Staphylococcus aureus  infection; K74.60-Unspecified cirrhosis of liver; fluid collection     TECHNIQUE: CT-guided drainage of a perinephric fluid collection     The radiation dose reduction device was turned on for each scan per the  ALARA (As Low as Reasonably Achievable) protocol.     COMPARISON: NONE     FINDINGS: Patient referred for CT-guided drainage of a perinephric fluid  collection. Procedure and risks were explained to the patient. Informed  consent was obtained and signed. Patient placed in the  left lateral  position upon the table. The right flank was prepped and draped in a  sterile fashion. 1% lidocaine used as a local anesthetic. Under CT  fluoroscopic guidance an 18-gauge 15 cm Chiba needle was advanced into  the perinephric fluid collection of approximately 10 ml of a reddish  fluid was removed with some debris within the fluid. There is a striated  delayed nephrogram seen of the right kidney suggesting possible  pyelonephritis with surrounding stranding of the perinephric fat.  Myelolipoma seen on the adrenal gland on the left. Chiba needle was  removed and no immediate complication occurred. Slight decrease seen in  size of the perinephric fluid collection status post drainage.       Impression:       CT-guided drainage of a perinephric fluid collection with no  immediate complication.     D:  10/09/2019  E:  10/09/2019     This report was finalized on 10/9/2019 5:12 PM by Dr. Nella Enriquez MD.       CT Abdomen Pelvis With Contrast [[id number]] Collected:  10/08/19 2202     Updated:  10/08/19 2222    Narrative:       CT ABDOMEN AND PELVIS WITH CONTRAST, 10/8/2019    HISTORY:  49-year-old male in the ED complaining of 2 day history right side abdomen pain, nausea, vomiting and generalized weakness. History of hepatic cirrhosis (DUNLAP). History of diabetes with left BKA and multiple abdominal wall abscesses and lower extremity  cellulitis in 2018.    TECHNIQUE:  CT imaging of the abdomen and pelvis with IV contrast. Radiation dose reduction techniques included automated exposure control. Radiation audit for CT and nuclear cardiology exams in the last 12 months: 7.    COMPARISON:  *  CT abdomen/pelvis, 8/18/2019.    ABDOMEN FINDINGS:  Images show a right lower perinephric fluid collection that is either subcapsular or pericapsular. This extends along the lower kidney for a length of about 8.5 cm and has a maximum thickness of 2.5 cm. The fluid is mixed attenuation and may be  hemorrhagic.  Correlate for any recent history of blunt trauma to the flank. Renal parenchyma shows no disruption or abnormal enhancement. There is no fracture of overlying right lower posterior ribs or transverse spinous processes. Given the history,  perinephric abscess is possible but is significantly less likely given normal renal parenchymal enhancement. No evidence of urinary obstruction. No visible nephrolithiasis.    Advanced hepatic cirrhosis. Mild splenomegaly. No suspicious liver lesion. No upper abdominal ascites. Hepatic vasculature appears patent. No gallbladder distention or bile duct dilatation. Negative pancreas.    Benign left adrenal myelolipoma measuring about 2.9 cm. Normal caliber abdominal aorta. Small bowel and colon are normal in caliber and appearance, as imaged. The appendix is normal. No gastric distention or distal esophageal dilatation.    PELVIS FINDINGS:  Air within the urinary bladder, likely iatrogenic. There is at least mild diffuse bladder wall thickening. Prostate and rectum are unremarkable. No inguinal hernia.    Lung base images show multiple benign calcified granulomas.      Impression:       1.  Small mixed attenuation right perinephric fluid collection along the inferior capsule, not present on the prior exam. Perinephric hematoma is a consideration. Correlate clinically for any history of blunt flank trauma. While abscess is possible, this  is unlikely as right renal parenchyma enhances normally with no evidence of pyelonephritis. Laboratory correlation recommended.  2.  Advanced hepatic cirrhosis. Splenomegaly.  3.  Urinary bladder wall thickening. Gas within the bladder is most likely iatrogenic.  4.  Benign left adrenal myelolipoma.    Signer Name: Fadi Healy MD   Signed: 10/8/2019 10:02 PM   Workstation Name: [id number]    [facility]    XR Chest PA & Lateral [[id number]] Collected:  10/08/19 2210     Updated:  10/08/19 2212    Narrative:        CHEST X-RAY, 10/8/2019      HISTORY:    49-year-old male in the ED with reported diabetic ketoacidosis.      TECHNIQUE:  AP portable upright chest series.      FINDINGS:  Heart size and pulmonary vascularity are normal. The lungs are expanded and clear. No visible pulmonary infiltrate or pleural effusion. Lower lung benign calcified granulomas.      Impression:       Negative chest.    Signer Name: Fadi Healy MD   Signed: 10/8/2019 10:10 PM   Workstation Name: [id number]    [facility]            Impression:      --Acute Kleb septicemia/sepsis, likely urinary source/pyelnoephritis associated with urinary retention and  with abnormal CT scan with perinephric collection/abscess and Kleb on aspirate;  IV zosyn ongoing; urology to determine any timing/option or threshold for further intervention or functional/anatomic assessment.    --Acute perinephric abscess as above;  Likely to need repeat imaging to assess for progress -v- other sequelae; d/w medicine regarding timing    --abnormal imaging with dry cough and probable pneumonia;  At risk for aspiration with recent vomiting/dry heaves and empiric zosyn started 10/19;  If productive cough, then send for culture; septic pulm emboli with kleb less likely    --acute loose stools/intermittent diarrhea improved per him; with Hx CDiff per him/family;  Empiric oral vancomycin added with high risk for relapse     --Hx perirectal abscess ; Colorectal surgery, Dr. Payne previously saw and is following.  Drainage as above on August 2 with mxed Fecal sherly in culture; CT scans  with no mention of abscess on 8/18 study;  Dr Payne  following given urology conern for fistula and to help determine any timing/option or threshold for further GI/colorectal work-up     --History urinary retention.  urology following     --Hx ARF in August 2019;  ?obstruction initially associated with retention postop (1200 out with I/O cath postop per nursing) -v- contrast  from CT -v- lisinopril/medication/vancomycin -v- relative hypotension -v- likely combination of factors with ATN;  vancomycin trough high and vancomycin stopped empirically 8/3 although high trough potentially accumulation as a consequence of diminishing renal function associated with retention/contrast/pressure rather than cause.  Nonetheless, avoid for now; likely further acute kidney injury at admission August 17 associated with dehydration/prerenal/ATN etiology     --History MRSA;  Not in current culture     --Diabetes mellitus type 2, uncontrolled.  He reports the reason for this is forgetfulness     --History [place] and chronic liver disease/cirrhosis  per past notes     --Left BKA     --Peripheral vascular disease     --medical noncompliance    PLAN:     --IV zosyn/oral vancomycin      --Check/review labs cultures and scans     --Discussed with microbiology     --History per nursing staff     --Discussed with Dr Payne     --Discussed with family, partial history per them     --Highly complex set of issues with high risk for further serious morbidity and other serious sequela     --Colorectal surgery and urology to follow    -- D/w Dr Elizondo    **intermittent nausea/dry heaves 10/18;  abd exam benign;  S/p repeat CT with persistent collection that may require repeat aspiration/drainage;  Empiric broadening of abx 10/19 as above with aspiration risk and probable pneumonia      Usman Dong MD  10/20/2019

## 2019-10-20 NOTE — PROGRESS NOTES
"    [facility] Medicine Services  PROGRESS NOTE    Patient Name: Kendrick Crystal  : 1970  MRN: [id number]    Date of Admission: 10/8/2019  Primary Care Physician: No primary care provider on file.    Subjective   Subjective     CC: f/u abscess    HPI:   Resting in bed, NAD. Eager to go home, talking about leaving AMA if we delay his discharge much longer. Wife at bedside, \"he can lay at home and receive antibiotics like he is here\". No longer having hematuria. Denies pain. Diarrhea stopped and now he has not had BM in 2 days. Has not been out of bed in 2 days.       Review of Systems  Gen- No fevers, chills  CV- No chest pain, palpitations  Resp- No cough, dyspnea  GI- No N/V/D, abd pain      Objective   Objective     Vital Signs:   Temp:  [97.9 °F (36.6 °C)] 97.9 °F (36.6 °C)  Heart Rate:  [82-86] 82  Resp:  [18] 18  BP: (137-157)/(65-94) 140/81        Physical Exam:  Constitutional: No acute distress, awake, alert, chronically ill appearing  HENT: NCAT, mucous membranes moist  Respiratory: Clear to auscultation bilaterally, respiratory effort normal   Cardiovascular: RRR, no murmurs, rubs, or gallops, palpable pedal pulses bilaterally  Gastrointestinal: Positive bowel sounds, soft, nontender, nondistended  Musculoskeletal: Left BKA stump intact   Psychiatric: Appropriate affect, cooperative  Neurologic: Oriented x 3, strength symmetric in all extremities, Cranial Nerves grossly intact to confrontation, speech clear  Skin: No rashes, multiple abrasions/ scabbed pretibial RLE     Results Reviewed:    Results from last 7 days   Lab Units 10/20/19  0536 10/19/19  0749 10/18/19  0953   WBC 10*3/mm3 16.90* 18.75* 13.12*   HEMOGLOBIN g/dL 8.4* 9.1* 8.9*   HEMATOCRIT % 27.7* 29.1* 28.6*   PLATELETS 10*3/mm3 338 352 358     Results from last 7 days   Lab Units 10/20/19  0536 10/19/19  0749 10/18/19  0953   SODIUM mmol/L 138 139 137   POTASSIUM mmol/L 3.9 3.7 3.6   CHLORIDE mmol/L 107 110* 109* "   CO2 mmol/L 24.0 20.0* 21.0*   BUN mg/dL 11 14 14   CREATININE mg/dL 0.47* 0.50* 0.51*   GLUCOSE mg/dL 199* 96 147*   CALCIUM mg/dL 7.7* 7.9* 7.5*   ALT (SGPT) U/L 13 13 16   AST (SGOT) U/L 17 13 20     Estimated Creatinine Clearance: 251.2 mL/min (A) (by C-G formula based on SCr of 0.47 mg/dL (L)).    Microbiology Results Abnormal     Procedure Component Value - Date/Time    Fungus Culture - Body Fluid, Peritoneum [[id number]] Collected:  10/09/19 1508    Lab Status:  Preliminary result Specimen:  Body Fluid from Peritoneum Updated:  10/16/19 1704     Fungus Culture No fungus isolated at 1 week    Anaerobic Culture - Body Fluid, Peritoneum [[id number]] Collected:  10/09/19 1508    Lab Status:  Final result Specimen:  Body Fluid from Peritoneum Updated:  10/14/19 0935     Anaerobic Culture No anaerobes isolated at 5 days    Urine Culture - Urine, Urine, Clean Catch [[id number]]  (Abnormal)  (Susceptibility) Collected:  10/08/19 1357    Lab Status:  Final result Specimen:  Urine, Clean Catch Updated:  10/12/19 0931     Urine Culture >100,000 CFU/mL Klebsiella pneumoniae ssp pneumoniae      >100,000 CFU/mL Escherichia coli    Susceptibility      Klebsiella pneumoniae ssp pneumoniae     EYAD     Ampicillin Resistant     Ampicillin + Sulbactam Susceptible     Cefazolin Susceptible     Cefepime Susceptible     Ceftazidime Susceptible     Ceftriaxone Susceptible     Gentamicin Susceptible     Levofloxacin Susceptible     Nitrofurantoin Intermediate     Piperacillin + Tazobactam Susceptible     Tetracycline Susceptible     Trimethoprim + Sulfamethoxazole Susceptible                Susceptibility      Escherichia coli     EYAD     Ampicillin Resistant     Ampicillin + Sulbactam Intermediate     Cefazolin Susceptible     Cefepime Susceptible     Ceftazidime Susceptible     Ceftriaxone Susceptible     Gentamicin Susceptible     Levofloxacin Resistant     Nitrofurantoin Susceptible     Piperacillin + Tazobactam Susceptible      Tetracycline Susceptible     Trimethoprim + Sulfamethoxazole Susceptible                    Body Fluid Culture - Body Fluid, Peritoneum [[id number]]  (Abnormal)  (Susceptibility) Collected:  10/09/19 1508    Lab Status:  Final result Specimen:  Body Fluid from Peritoneum Updated:  10/11/19 0621     Body Fluid Culture Moderate growth (3+) Klebsiella pneumoniae ssp pneumoniae     Gram Stain Many (4+) WBCs per low power field      Moderate (3+) Gram negative bacilli    Susceptibility      Klebsiella pneumoniae ssp pneumoniae     EYAD     Ampicillin Resistant     Ampicillin + Sulbactam Susceptible     Cefazolin Susceptible     Cefepime Susceptible     Ceftazidime Susceptible     Ceftriaxone Susceptible     Gentamicin Susceptible     Levofloxacin Susceptible     Piperacillin + Tazobactam Susceptible     Trimethoprim + Sulfamethoxazole Susceptible                    Blood Culture - Blood, Arm, Right [[id number]]  (Abnormal) Collected:  10/08/19 2012    Lab Status:  Final result Specimen:  Blood from Arm, Right Updated:  10/10/19 2122     Blood Culture Klebsiella pneumoniae ssp pneumoniae     Isolated from Aerobic and Anaerobic Bottles     Gram Stain Aerobic Bottle Gram negative bacilli      Anaerobic Bottle Gram negative bacilli    Narrative:       Refer to blood culture collected 10/8/2019 at 2000 for MICs.    Blood Culture - Blood, Arm, Left [[id number]]  (Abnormal)  (Susceptibility) Collected:  10/08/19 2000    Lab Status:  Final result Specimen:  Blood from Arm, Left Updated:  10/10/19 2122     Blood Culture Klebsiella pneumoniae ssp pneumoniae     Isolated from Aerobic and Anaerobic Bottles     Gram Stain Anaerobic Bottle Gram negative bacilli      Aerobic Bottle Gram negative bacilli    Susceptibility      Klebsiella pneumoniae ssp pneumoniae     EYAD     Ampicillin Resistant     Ampicillin + Sulbactam Susceptible     Cefazolin Susceptible     Cefepime Susceptible     Ceftazidime Susceptible     Ceftriaxone Susceptible      Gentamicin Susceptible     Levofloxacin Susceptible     Piperacillin + Tazobactam Susceptible     Trimethoprim + Sulfamethoxazole Susceptible                    Blood Culture ID, PCR - Blood, Arm, Left [[id number]]  (Abnormal) Collected:  10/08/19 2000    Lab Status:  Final result Specimen:  Blood from Arm, Left Updated:  10/09/19 1038     BCID, PCR Klebsiella pneumoniae. Identification by BCID PCR.        Personally reviewed CT A/P with persistent right renal fluid collection. Agree with interpretation.  Imaging Results (last 24 hours)     ** No results found for the last 24 hours. **          Results for orders placed during the hospital encounter of 05/18/19   Adult Transthoracic Echo Complete W/ Cont if Necessary Per Protocol    Narrative · Estimated EF = 60%.  · Mild mitral valve regurgitation is present  · Mild tricuspid valve regurgitation is present.  · Calculated right ventricular systolic pressure from tricuspid   regurgitation is 29 mmHg.          I have reviewed the medications:  Scheduled Meds:    amLODIPine 10 mg Oral Q24H   DULoxetine 30 mg Oral Daily   insulin detemir 20 Units Subcutaneous Q12H   insulin lispro 0-9 Units Subcutaneous 4x Daily With Meals & Nightly   insulin lispro 4 Units Subcutaneous TID With Meals   metoprolol tartrate 100 mg Oral Q12H   nystatin  Topical Q12H   piperacillin-tazobactam 3.375 g Intravenous Q8H   saccharomyces boulardii 250 mg Oral BID   sevelamer 800 mg Oral TID With Meals   sodium chloride 10 mL Intravenous Q12H   sodium chloride 10 mL Intravenous Q12H   terazosin 5 mg Oral Nightly   vancomycin 125 mg Oral Q6H     Continuous Infusions:    sodium chloride 100 mL/hr Last Rate: 100 mL/hr (10/20/19 0315)     PRN Meds:.•  acetaminophen **OR** acetaminophen **OR** acetaminophen  •  dextrose  •  dextrose  •  glucagon (human recombinant)  •  HYDROcodone-acetaminophen  •  magnesium sulfate **OR** magnesium sulfate **OR** magnesium sulfate  •  melatonin  •  ondansetron  •   potassium chloride **OR** potassium chloride **OR** potassium chloride  •  sodium chloride  •  sodium chloride  •  witch hazel-glycerin      Assessment/Plan   Assessment / Plan     Active Hospital Problems    Diagnosis  POA   • **Perinephric abscess [N15.1]  Yes   • Bacteremia due to Klebsiella pneumoniae [R78.81]  Unknown   • Acute UTI (urinary tract infection) [N39.0]  Yes   • Gastroparesis [K31.84]  Yes   • S/P BKA (below knee amputation), left (CMS/HCC) [Z89.512]  Not Applicable   • Type 2 diabetes mellitus with circulatory disorder and uncontrolled [E11.59]  Yes   • DUNLAP Cirrhosis [K74.60]  Yes   • Essential hypertension [I10]  Yes   • Hyperlipemia [E78.5]  Yes   • Non-compliance [Z91.14]  Not Applicable      Resolved Hospital Problems   No resolved problems to display.        Brief Hospital Course to date:  Kendrick Crystal is a 49 y.o. male  48 yo gentleman new patient for me today, hx of T2DM, hypertension, hx of multiple skin/abscesses/MRSA infection, DUNLAP cirrhosis, PVD s/p L BKA, and  presented with sepsis 2/2 renal and perinephric abscess s/p IR guided drainage of abscess culture growing Klebsiella pneumoniae. Given persistent WBC elevation and findings on CT, broadened to IV zosyn 10/19. This is my first day assessing active issues.    A/P:     Sepsis 2/2 perinephric abscess and UTI, Klebsiella pneuomniae bacteremia  Persistent leukocytosis  -I have reviewed his cultures from peritoneum and blood cultures from 10/10 growing Klebsiella resistant to ampicillin   -Dr. Dong Broadening abx to zosyn 10/19. (WBC has trended down with broadening, cont to monitor)  Also with hx of c diff on PO vancomycin while on ABX with stop date 10/29 (per nursing has been refusing at times)  -Urology has seen, pt s/p cystoscopy 10/16 to evaluate for hematuria and assess for poss fistula from bowel to bladder. Given persistent abscess on CT we will need to discuss with them on Monday whether or not drainage of this is  recommended.     Hx of C diff with intermittent loose stool: continue vancomycin as stated above (end date 10/29/2019)  --per staff, stool slightly thickening.  Monitor.         HTN:   -- isolated episode of hypotension which appears to be likely false reading given following days of elevation. I will continue lopressor 100 mg q12h and amlodipine 10 mg q24h      T2DM poorly controlled  --I reviewed his blood glucose over past 24 hours showing excellent control. Continue current insulin regimen.       Weakness  --Patient continues to refuse PT/OT and refuse SNF. He states he will go home with home health, and has not been out of bed in 2 days.      DVT Prophylaxis:  Mechanical      Disposition: I expect the patient to be discharged next week.    CODE STATUS:   Code Status and Medical Interventions:   Ordered at: 10/08/19 9413     Level Of Support Discussed With:    Patient     Code Status:    CPR     Medical Interventions (Level of Support Prior to Arrest):    Full         Electronically signed by CHINTAN Martin, 10/20/19, 2:48 PM.

## 2019-10-21 LAB
ALBUMIN SERPL-MCNC: 2.2 G/DL (ref 3.5–5.2)
ALBUMIN/GLOB SERPL: 0.6 G/DL
ALP SERPL-CCNC: 246 U/L (ref 39–117)
ALT SERPL W P-5'-P-CCNC: 15 U/L (ref 1–41)
ANION GAP SERPL CALCULATED.3IONS-SCNC: 8 MMOL/L (ref 5–15)
AST SERPL-CCNC: 19 U/L (ref 1–40)
BILIRUB SERPL-MCNC: <0.2 MG/DL (ref 0.2–1.2)
BUN BLD-MCNC: 8 MG/DL (ref 6–20)
BUN/CREAT SERPL: 17.8 (ref 7–25)
CALCIUM SPEC-SCNC: 8.2 MG/DL (ref 8.6–10.5)
CHLORIDE SERPL-SCNC: 104 MMOL/L (ref 98–107)
CO2 SERPL-SCNC: 28 MMOL/L (ref 22–29)
CREAT BLD-MCNC: 0.45 MG/DL (ref 0.76–1.27)
DEPRECATED RDW RBC AUTO: 45.1 FL (ref 37–54)
ERYTHROCYTE [DISTWIDTH] IN BLOOD BY AUTOMATED COUNT: 14.4 % (ref 12.3–15.4)
GFR SERPL CREATININE-BSD FRML MDRD: >150 ML/MIN/1.73
GLOBULIN UR ELPH-MCNC: 3.7 GM/DL
GLUCOSE BLD-MCNC: 106 MG/DL (ref 65–99)
GLUCOSE BLDC GLUCOMTR-MCNC: 138 MG/DL (ref 70–130)
GLUCOSE BLDC GLUCOMTR-MCNC: 147 MG/DL (ref 70–130)
GLUCOSE BLDC GLUCOMTR-MCNC: 174 MG/DL (ref 70–130)
GLUCOSE BLDC GLUCOMTR-MCNC: 184 MG/DL (ref 70–130)
HCT VFR BLD AUTO: 28.9 % (ref 37.5–51)
HGB BLD-MCNC: 9.1 G/DL (ref 13–17.7)
MCH RBC QN AUTO: 27.2 PG (ref 26.6–33)
MCHC RBC AUTO-ENTMCNC: 31.5 G/DL (ref 31.5–35.7)
MCV RBC AUTO: 86.5 FL (ref 79–97)
PLATELET # BLD AUTO: 375 10*3/MM3 (ref 140–450)
PMV BLD AUTO: 10.7 FL (ref 6–12)
POTASSIUM BLD-SCNC: 3.8 MMOL/L (ref 3.5–5.2)
PROT SERPL-MCNC: 5.9 G/DL (ref 6–8.5)
RBC # BLD AUTO: 3.34 10*6/MM3 (ref 4.14–5.8)
SODIUM BLD-SCNC: 140 MMOL/L (ref 136–145)
WBC NRBC COR # BLD: 15.94 10*3/MM3 (ref 3.4–10.8)

## 2019-10-21 PROCEDURE — 80053 COMPREHEN METABOLIC PANEL: CPT | Performed by: INTERNAL MEDICINE

## 2019-10-21 PROCEDURE — 85027 COMPLETE CBC AUTOMATED: CPT | Performed by: INTERNAL MEDICINE

## 2019-10-21 PROCEDURE — 82962 GLUCOSE BLOOD TEST: CPT

## 2019-10-21 PROCEDURE — 63710000001 INSULIN DETEMIR PER 5 UNITS: Performed by: PHYSICIAN ASSISTANT

## 2019-10-21 PROCEDURE — 25010000002 PIPERACILLIN SOD-TAZOBACTAM PER 1 G: Performed by: INTERNAL MEDICINE

## 2019-10-21 PROCEDURE — 99233 SBSQ HOSP IP/OBS HIGH 50: CPT | Performed by: INTERNAL MEDICINE

## 2019-10-21 RX ADMIN — VANCOMYCIN HYDROCHLORIDE 125 MG: KIT at 23:53

## 2019-10-21 RX ADMIN — INSULIN LISPRO 2 UNITS: 100 INJECTION, SOLUTION INTRAVENOUS; SUBCUTANEOUS at 13:47

## 2019-10-21 RX ADMIN — INSULIN LISPRO 2 UNITS: 100 INJECTION, SOLUTION INTRAVENOUS; SUBCUTANEOUS at 20:49

## 2019-10-21 RX ADMIN — TAZOBACTAM SODIUM AND PIPERACILLIN SODIUM 3.38 G: 375; 3 INJECTION, SOLUTION INTRAVENOUS at 00:51

## 2019-10-21 RX ADMIN — INSULIN DETEMIR 20 UNITS: 100 INJECTION, SOLUTION SUBCUTANEOUS at 09:35

## 2019-10-21 RX ADMIN — VANCOMYCIN HYDROCHLORIDE 125 MG: KIT at 00:52

## 2019-10-21 RX ADMIN — TAZOBACTAM SODIUM AND PIPERACILLIN SODIUM 3.38 G: 375; 3 INJECTION, SOLUTION INTRAVENOUS at 09:34

## 2019-10-21 RX ADMIN — Medication 250 MG: at 20:49

## 2019-10-21 RX ADMIN — Medication 250 MG: at 09:31

## 2019-10-21 RX ADMIN — VANCOMYCIN HYDROCHLORIDE 125 MG: KIT at 13:46

## 2019-10-21 RX ADMIN — INSULIN LISPRO 4 UNITS: 100 INJECTION, SOLUTION INTRAVENOUS; SUBCUTANEOUS at 09:34

## 2019-10-21 RX ADMIN — SODIUM CHLORIDE 100 ML/HR: 9 INJECTION, SOLUTION INTRAVENOUS at 10:30

## 2019-10-21 RX ADMIN — INSULIN DETEMIR 20 UNITS: 100 INJECTION, SOLUTION SUBCUTANEOUS at 20:49

## 2019-10-21 RX ADMIN — INSULIN LISPRO 4 UNITS: 100 INJECTION, SOLUTION INTRAVENOUS; SUBCUTANEOUS at 17:29

## 2019-10-21 RX ADMIN — METOPROLOL TARTRATE 100 MG: 100 TABLET ORAL at 20:49

## 2019-10-21 RX ADMIN — INSULIN LISPRO 4 UNITS: 100 INJECTION, SOLUTION INTRAVENOUS; SUBCUTANEOUS at 13:46

## 2019-10-21 RX ADMIN — TERAZOSIN HYDROCHLORIDE ANHYDROUS 5 MG: 5 CAPSULE ORAL at 20:49

## 2019-10-21 RX ADMIN — METOPROLOL TARTRATE 100 MG: 100 TABLET ORAL at 09:31

## 2019-10-21 RX ADMIN — AMLODIPINE BESYLATE 10 MG: 10 TABLET ORAL at 09:31

## 2019-10-21 RX ADMIN — RENAGEL 800 MG: 800 TABLET ORAL at 13:46

## 2019-10-21 RX ADMIN — VANCOMYCIN HYDROCHLORIDE 125 MG: KIT at 17:30

## 2019-10-21 RX ADMIN — NYSTATIN: 100000 POWDER TOPICAL at 09:34

## 2019-10-21 RX ADMIN — RENAGEL 800 MG: 800 TABLET ORAL at 09:31

## 2019-10-21 RX ADMIN — RENAGEL 800 MG: 800 TABLET ORAL at 17:51

## 2019-10-21 RX ADMIN — DULOXETINE HYDROCHLORIDE 30 MG: 30 CAPSULE, DELAYED RELEASE ORAL at 13:46

## 2019-10-21 RX ADMIN — NYSTATIN: 100000 POWDER TOPICAL at 20:48

## 2019-10-21 RX ADMIN — TAZOBACTAM SODIUM AND PIPERACILLIN SODIUM 3.38 G: 375; 3 INJECTION, SOLUTION INTRAVENOUS at 17:29

## 2019-10-21 NOTE — PROGRESS NOTES
Continued Stay Note  [facility]     Patient Name: Kendrick Crystal  MRN: [id number]  Today's Date: 10/21/2019    Admit Date: 10/8/2019    Discharge Plan     Row Name 10/21/19 1444       Plan    Plan  TBD    Patient/Family in Agreement with Plan  other (see comments)    Plan Comments  Not medically ready for discharge.  Discussed in rounds, attending wants to discuss with patient his lack of participation with therapy before ordering HH.  Will follow up in am.      Final Discharge Disposition Code  30 - still a patient        Discharge Codes    No documentation.       Expected Discharge Date and Time     Expected Discharge Date Expected Discharge Time    Oct 23, 2019             Barb Batista RN

## 2019-10-21 NOTE — PROGRESS NOTES
[facility] Medicine Services  PROGRESS NOTE    Patient Name: Kendrick Crystal  : 1970  MRN: [id number]    Date of Admission: 10/8/2019  Primary Care Physician: No primary care provider on file.    Subjective   Subjective     CC:  Follow-up bacteremia and perinephric abscess    HPI:  Currently states he is doing better today.  Denies any fevers or chills.  Denies any pain at this time.  States he is tolerating antibiotics without issue.  He has family at the bedside.    Review of Systems  No current fevers or chills  No current shortness of breath or cough  No current nausea, vomiting, or diarrhea  No current chest pain or palpitations      Objective   Objective     Vital Signs:   Temp:  [97.8 °F (36.6 °C)-98.2 °F (36.8 °C)] 97.8 °F (36.6 °C)  Heart Rate:  [81] 81  Resp:  [17-18] 17  BP: (152-159)/(80-93) 152/80        Physical Exam:  Constitutional:Awake, alert  HENT: NCAT, mucous membranes moist, neck supple  Respiratory: Clear to auscultation bilaterally, respiratory effort normal, nonlabored breathing   Cardiovascular: RRR, S1, S2, normal radial pulses  Gastrointestinal: Positive bowel sounds, soft, nontender, nondistended  Musculoskeletal: Debilitated in appearance, obese BMI 31, left below the knee amputation, trace right lower extremity edema   Psychiatric: Appropriate affect, cooperative, conversational  Neurologic: No slurred speech or facial droop, follows commands  Skin: No rashes or jaundice, warm      Results Reviewed:    Results from last 7 days   Lab Units 10/21/19  0634 10/20/19  0536 10/19/19  0749   WBC 10*3/mm3 15.94* 16.90* 18.75*   HEMOGLOBIN g/dL 9.1* 8.4* 9.1*   HEMATOCRIT % 28.9* 27.7* 29.1*   PLATELETS 10*3/mm3 375 338 352     Results from last 7 days   Lab Units 10/21/19  0634 10/20/19  0536 10/19/19  0749   SODIUM mmol/L 140 138 139   POTASSIUM mmol/L 3.8 3.9 3.7   CHLORIDE mmol/L 104 107 110*   CO2 mmol/L 28.0 24.0 20.0*   BUN mg/dL 8 11 14   CREATININE  mg/dL 0.45* 0.47* 0.50*   GLUCOSE mg/dL 106* 199* 96   CALCIUM mg/dL 8.2* 7.7* 7.9*   ALT (SGPT) U/L 15 13 13   AST (SGOT) U/L 19 17 13     Estimated Creatinine Clearance: 262.3 mL/min (A) (by C-G formula based on SCr of 0.45 mg/dL (L)).    Microbiology Results Abnormal     Procedure Component Value - Date/Time    Fungus Culture - Body Fluid, Peritoneum [[id number]] Collected:  10/09/19 1508    Lab Status:  Preliminary result Specimen:  Body Fluid from Peritoneum Updated:  10/16/19 1704     Fungus Culture No fungus isolated at 1 week    Anaerobic Culture - Body Fluid, Peritoneum [[id number]] Collected:  10/09/19 1508    Lab Status:  Final result Specimen:  Body Fluid from Peritoneum Updated:  10/14/19 0935     Anaerobic Culture No anaerobes isolated at 5 days    Urine Culture - Urine, Urine, Clean Catch [[id number]]  (Abnormal)  (Susceptibility) Collected:  10/08/19 4372    Lab Status:  Final result Specimen:  Urine, Clean Catch Updated:  10/12/19 0931     Urine Culture >100,000 CFU/mL Klebsiella pneumoniae ssp pneumoniae      >100,000 CFU/mL Escherichia coli    Susceptibility      Klebsiella pneumoniae ssp pneumoniae     EYAD     Ampicillin Resistant     Ampicillin + Sulbactam Susceptible     Cefazolin Susceptible     Cefepime Susceptible     Ceftazidime Susceptible     Ceftriaxone Susceptible     Gentamicin Susceptible     Levofloxacin Susceptible     Nitrofurantoin Intermediate     Piperacillin + Tazobactam Susceptible     Tetracycline Susceptible     Trimethoprim + Sulfamethoxazole Susceptible                Susceptibility      Escherichia coli     EYAD     Ampicillin Resistant     Ampicillin + Sulbactam Intermediate     Cefazolin Susceptible     Cefepime Susceptible     Ceftazidime Susceptible     Ceftriaxone Susceptible     Gentamicin Susceptible     Levofloxacin Resistant     Nitrofurantoin Susceptible     Piperacillin + Tazobactam Susceptible     Tetracycline Susceptible     Trimethoprim + Sulfamethoxazole  Susceptible                    Body Fluid Culture - Body Fluid, Peritoneum [[id number]]  (Abnormal)  (Susceptibility) Collected:  10/09/19 1508    Lab Status:  Final result Specimen:  Body Fluid from Peritoneum Updated:  10/11/19 0621     Body Fluid Culture Moderate growth (3+) Klebsiella pneumoniae ssp pneumoniae     Gram Stain Many (4+) WBCs per low power field      Moderate (3+) Gram negative bacilli    Susceptibility      Klebsiella pneumoniae ssp pneumoniae     EYAD     Ampicillin Resistant     Ampicillin + Sulbactam Susceptible     Cefazolin Susceptible     Cefepime Susceptible     Ceftazidime Susceptible     Ceftriaxone Susceptible     Gentamicin Susceptible     Levofloxacin Susceptible     Piperacillin + Tazobactam Susceptible     Trimethoprim + Sulfamethoxazole Susceptible                    Blood Culture - Blood, Arm, Right [[id number]]  (Abnormal) Collected:  10/08/19 2012    Lab Status:  Final result Specimen:  Blood from Arm, Right Updated:  10/10/19 2122     Blood Culture Klebsiella pneumoniae ssp pneumoniae     Isolated from Aerobic and Anaerobic Bottles     Gram Stain Aerobic Bottle Gram negative bacilli      Anaerobic Bottle Gram negative bacilli    Narrative:       Refer to blood culture collected 10/8/2019 at 2000 for MICs.    Blood Culture - Blood, Arm, Left [[id number]]  (Abnormal)  (Susceptibility) Collected:  10/08/19 2000    Lab Status:  Final result Specimen:  Blood from Arm, Left Updated:  10/10/19 2122     Blood Culture Klebsiella pneumoniae ssp pneumoniae     Isolated from Aerobic and Anaerobic Bottles     Gram Stain Anaerobic Bottle Gram negative bacilli      Aerobic Bottle Gram negative bacilli    Susceptibility      Klebsiella pneumoniae ssp pneumoniae     EYAD     Ampicillin Resistant     Ampicillin + Sulbactam Susceptible     Cefazolin Susceptible     Cefepime Susceptible     Ceftazidime Susceptible     Ceftriaxone Susceptible     Gentamicin Susceptible     Levofloxacin Susceptible      Piperacillin + Tazobactam Susceptible     Trimethoprim + Sulfamethoxazole Susceptible                    Blood Culture ID, PCR - Blood, Arm, Left [[id number]]  (Abnormal) Collected:  10/08/19 2000    Lab Status:  Final result Specimen:  Blood from Arm, Left Updated:  10/09/19 1038     BCID, PCR Klebsiella pneumoniae. Identification by BCID PCR.          Imaging Results (last 24 hours)     ** No results found for the last 24 hours. **          Results for orders placed during the hospital encounter of 05/18/19   Adult Transthoracic Echo Complete W/ Cont if Necessary Per Protocol    Narrative · Estimated EF = 60%.  · Mild mitral valve regurgitation is present  · Mild tricuspid valve regurgitation is present.  · Calculated right ventricular systolic pressure from tricuspid   regurgitation is 29 mmHg.          I have reviewed the medications:  Scheduled Meds:  amLODIPine 10 mg Oral Q24H   DULoxetine 30 mg Oral Daily   insulin detemir 20 Units Subcutaneous Q12H   insulin lispro 0-9 Units Subcutaneous 4x Daily With Meals & Nightly   insulin lispro 4 Units Subcutaneous TID With Meals   metoprolol tartrate 100 mg Oral Q12H   nystatin  Topical Q12H   piperacillin-tazobactam 3.375 g Intravenous Q8H   saccharomyces boulardii 250 mg Oral BID   sevelamer 800 mg Oral TID With Meals   sodium chloride 10 mL Intravenous Q12H   sodium chloride 10 mL Intravenous Q12H   terazosin 5 mg Oral Nightly   vancomycin 125 mg Oral Q6H     Continuous Infusions:  sodium chloride 100 mL/hr Last Rate: 100 mL/hr (10/20/19 0315)     PRN Meds:.•  acetaminophen **OR** acetaminophen **OR** acetaminophen  •  dextrose  •  dextrose  •  glucagon (human recombinant)  •  HYDROcodone-acetaminophen  •  magnesium sulfate **OR** magnesium sulfate **OR** magnesium sulfate  •  melatonin  •  ondansetron  •  potassium chloride **OR** potassium chloride **OR** potassium chloride  •  sodium chloride  •  sodium chloride  •  witch hazel-glycerin      Assessment/Plan    Assessment / Plan     Active Hospital Problems    Diagnosis  POA   • **Perinephric abscess [N15.1]  Yes   • Bacteremia due to Klebsiella pneumoniae [R78.81]  Unknown   • Acute UTI (urinary tract infection) [N39.0]  Yes   • Gastroparesis [K31.84]  Yes   • S/P BKA (below knee amputation), left (CMS/HCC) [Z89.512]  Not Applicable   • Type 2 diabetes mellitus with circulatory disorder and uncontrolled [E11.59]  Yes   • DUNLAP Cirrhosis [K74.60]  Yes   • Essential hypertension [I10]  Yes   • Hyperlipemia [E78.5]  Yes   • Non-compliance [Z91.14]  Not Applicable      Resolved Hospital Problems   No resolved problems to display.        Brief Hospital Course to date:  Kendrick Crystal is a 49 y.o. male history of diabetes, hypertension, previous skin infections with MRSA, [place] cirrhosis, peripheral vascular disease status post left below the knee amputation who presents with sepsis due to renal and perinephric abscess status post drainage by interventional radiology with positive cultures of Klebsiella pneumoniae.  Urinary tract infection with Klebsiella and E. coli.  Klebsiella bacteremia.    Sepsis secondary to Klebsiella perinephric abscess, Klebsiella and E. coli urinary tract infection, Klebsiella bacteremia:  -Continue treatment with Zosyn.  -Urology has completed cystoscopy.  -Clinically responding to antibiotics and leukocytosis slowly trending down.    History of C. difficile colitis with intermittently loose stool:  -Continuing prophylactic oral vancomycin.  No current diarrhea reported.    Essential hypertension:  -Blood pressure mildly elevated.  Continue current regimen and monitor.  Avoid hypotension in the setting of infection.    Type 2 diabetes mellitus with diabetic peripheral polyneuropathy: A1c 14.1  Severely uncontrolled on outpatient basis.  Currently on basal with prandial and correction.  Glucose appears to be well controlled this morning.  Monitor glucose and adjust further if  needed.    Weakness/debility/below the knee amputation:  Patient refuses therapy and refuses placement at discharge.  He has a power chair at the bedside.  He reports his current prosthesis does not fit and reports he is to receive a new one soon.  Encouraging him to work with therapy and mobilize however he has not interested.  Continue daily counseling.    [place] cirrhosis: Currently appears reasonably compensated.  Monitor.  No encephalopathy    Gastroparesis: Tolerating diet.  Should improve with improved glucose control.    Case discussed with infectious disease.  Plan discussed with urology today regarding abscess and with further drainage as needed.  Repeat laboratory studies tomorrow.    Addendum: Case discussed with urology.  With leukocyte count improving they recommend to hold the course of antibiotic for now and hold off on further invasive drainage at the time being.  They plan to reassess at some point this week.           DVT Prophylaxis: Mechanical for now    Disposition: I expect the patient to be discharged home once cleared by infectious disease    CODE STATUS:   Code Status and Medical Interventions:   Ordered at: 10/08/19 3025     Level Of Support Discussed With:    Patient     Code Status:    CPR     Medical Interventions (Level of Support Prior to Arrest):    Full         Electronically signed by Andrei Vora MD, 10/21/19, 8:38 AM.

## 2019-10-21 NOTE — PLAN OF CARE
Problem: Fall Risk (Adult)  Goal: Absence of Fall  Outcome: Ongoing (interventions implemented as appropriate)   10/21/19 0441   Fall Risk (Adult)   Absence of Fall making progress toward outcome       Problem: Skin Injury Risk (Adult)  Goal: Skin Health and Integrity  Outcome: Outcome(s) achieved Date Met: 10/21/19   10/21/19 0441   Skin Injury Risk (Adult)   Skin Health and Integrity making progress toward outcome       Problem: Pain, Acute (Adult)  Goal: Acceptable Pain Control/Comfort Level  Outcome: Ongoing (interventions implemented as appropriate)   10/21/19 0441   Pain, Acute (Adult)   Acceptable Pain Control/Comfort Level making progress toward outcome

## 2019-10-21 NOTE — PROGRESS NOTES
"Daily Progress Note    Patient: Kendrick MADRID Osborn  [facility] Day: 13    Subjective: Pt without c/o's      Objective:     /80 (BP Location: Left arm, Patient Position: Lying)   Pulse 81   Temp 97.8 °F (36.6 °C) (Oral)   Resp 17   Ht 190.5 cm (75\")   Wt 112 kg (246 lb)   SpO2 96%   BMI 30.75 kg/m²       Intake/Output Summary (Last 24 hours) at 10/21/2019 1152  Last data filed at 10/21/2019 0750  Gross per 24 hour   Intake 410 ml   Output 6000 ml   Net -5590 ml       Review of Systems:    Physical Exam:   General Appearance: alert, appears stated age and cooperative  Head: normocephalic, without obvious abnormality and atraumatic  Lungs respirations regular  s      Lab Results (last 24 hours)     Procedure Component Value Units Date/Time    Comprehensive Metabolic Panel [[id number]]  (Abnormal) Collected:  10/21/19 0634    Specimen:  Blood Updated:  10/21/19 0758     Glucose 106 mg/dL      BUN 8 mg/dL      Creatinine 0.45 mg/dL      Sodium 140 mmol/L      Potassium 3.8 mmol/L      Chloride 104 mmol/L      CO2 28.0 mmol/L      Calcium 8.2 mg/dL      Total Protein 5.9 g/dL      Albumin 2.20 g/dL      ALT (SGPT) 15 U/L      AST (SGOT) 19 U/L      Alkaline Phosphatase 246 U/L      Total Bilirubin <0.2 mg/dL      eGFR Non African Amer >150 mL/min/1.73      Globulin 3.7 gm/dL      A/G Ratio 0.6 g/dL      BUN/Creatinine Ratio 17.8     Anion Gap 8.0 mmol/L     Narrative:       GFR Normal >60  Chronic Kidney Disease <60  Kidney Failure <15    POC Glucose Once [[id number]]  (Abnormal) Collected:  10/21/19 0750    Specimen:  Blood Updated:  10/21/19 0752     Glucose 138 mg/dL     CBC (No Diff) [[id number]]  (Abnormal) Collected:  10/21/19 0634    Specimen:  Blood Updated:  10/21/19 0735     WBC 15.94 10*3/mm3      RBC 3.34 10*6/mm3      Hemoglobin 9.1 g/dL      Hematocrit 28.9 %      MCV 86.5 fL      MCH 27.2 pg      MCHC 31.5 g/dL      RDW 14.4 %      RDW-SD 45.1 fl      MPV 10.7 fL      Platelets 375 10*3/mm3     POC " Glucose Once [[id number]]  (Normal) Collected:  10/20/19 2006    Specimen:  Blood Updated:  10/20/19 2012     Glucose 102 mg/dL     POC Glucose Once [[id number]]  (Normal) Collected:  10/20/19 1645    Specimen:  Blood Updated:  10/20/19 1647     Glucose 91 mg/dL           Imaging Results (last 24 hours)     ** No results found for the last 24 hours. **          Assessment/Plan: R perinephric fluid collection previously drained with scant fluid collected but that was + for Klebsiella which he has grown in blood as well.  Repeat CT with little change in size of claudia-renal fluid collection.  By appearance it is more c/w perinephric hematoma.  Discussed with Dr. Vora.  I do not think repeat percutaneous procedure needed at this time.  Would continue with IV abx. Pt has multiple sources for elevated WBC.        Patient Active Problem List   Diagnosis   • DUNLAP Cirrhosis   • Essential hypertension   • Non-compliance   • Hyperlipemia   • Hypertriglyceridemia, essential   • Sepsis affecting skin   • Type 2 diabetes mellitus with circulatory disorder and uncontrolled   • History of MRSA infection   • S/P BKA (below knee amputation), left (CMS/HCC)   • Peripheral vascular disease (CMS/HCC)   • DM (diabetes mellitus) type II uncontrolled, periph vascular disorder (CMS/HCC)   • Obesity (BMI 30-39.9)   • Gastroparesis   • Perinephric abscess   • Acute UTI (urinary tract infection)   • Bacteremia due to Klebsiella pneumoniae        Diagnosis Plan   1. Perinephric abscess     2. Sepsis, due to unspecified organism, unspecified whether acute organ dysfunction present (CMS/HCC)     3. Dehydration     4. Uncontrolled type 2 diabetes mellitus with hyperglycemia (CMS/HCC)     5. Essential hypertension     6. History of MRSA infection     7. Cirrhosis of liver without ascites, unspecified hepatic cirrhosis type (CMS/HCC)     8. Impaired mobility and ADLs           Brad Gutierres MD - 10/21/2019, 11:52 AM

## 2019-10-21 NOTE — PROGRESS NOTES
[facility] Progress Note        Antibiotics:  Anti-Infectives (From admission, onward)    Ordered     Dose/Rate Route Frequency Start Stop    10/19/19 0957  piperacillin-tazobactam (ZOSYN) 3.375 g in iso-osmotic dextrose 50 ml (premix)     Ordering Provider:  Usman Dong MD    3.375 g  over 4 Hours Intravenous Every 8 Hours 10/19/19 1700 10/29/19 1659    10/19/19 0957  piperacillin-tazobactam (ZOSYN) 3.375 g in iso-osmotic dextrose 50 ml (premix)     Ordering Provider:  Usman Dong MD    3.375 g  over 30 Minutes Intravenous Once 10/19/19 1045 10/19/19 1257    10/15/19 1127  vancomycin oral solution reconstituted 125 mg     Jeyson Neely [facility] reviewed the order on 10/17/19 1241.   Ordering Provider:  Usman Dong MD    125 mg Oral Every 6 Hours Scheduled 10/15/19 1215 10/29/19 1159    10/09/19 1136  meropenem (MERREM) 1 g/100 mL 0.9% NS VTB (mbp)     Ordering Provider:  Usman Dong MD    1 g  over 30 Minutes Intravenous Once 10/09/19 1230 10/09/19 1251    10/08/19 2338  piperacillin-tazobactam (ZOSYN) 3.375 g in iso-osmotic dextrose 50 ml (premix)     Ordering Provider:  Yasmany Martinez PA-C    3.375 g  over 30 Minutes Intravenous Once 10/08/19 2340 10/09/19 0016          CC: fatigue    HPI:  Patient is a 49 y.o.  Yr old male with history of poorly contolled T2DM, DUNLAP/liver cirrhosis, HTN, PVD/Left BKA, and multiple skin abscesses/MRSA who presented to [facility] ED with right shin wound infection summer 2018.  He was unable to get to see Dr. Martinez as his father passed away unexpectedly on 18 and he had to wait until after the .  The wound worsened becoming gangrenous and he came to [facility] ED in 2018.  He also had been hospitalized at [facility] and then transferred to  for a severe penis/scrotum infection requiring surgical debridement in summer 2018.  He received antibiotics regarding his right leg infection, generally improved over the remainder of  summer 2018 but that area had not completely healed. He was admitted April 24, 2019 with acute left lower abdominal wall redness/swelling and pain, spontaneous drainage associated with fever/chills and uncontrolled blood sugars.  4/26 I&D requiring wide debridement , severe/deep infection and transferred to [facility] on May 3 with IV Zosyn/oral doxycycline. Cultures had lactobacillus and mixed gram-positive skin sherly including alpha strep, staph epidermidis and corynebacterium. Readmitted to [facility] May 18, 2019, increasing generalized edema ultimately transitioned to oral doxycycline and healed.     He presented to the emergency room July 30, 2019 with acute rectal pain that had begun 7/27; this is associated with constipation for days, chills and nausea/dry heaving.  White blood cell count elevated, high hemoglobin A1c over 13, urinalysis with hematuria/pyuria and CT scan with 3 x 3 cm fluid collection anterior to the distal rectum and per discussion with Dr. Akbar/Jennifer, this is not in the prostate.  Colorectal surgery/urology saw him for consideration of surgical options/timing/threshold, etc..     8/2/19 I&Dper Dr Payne perirectal abscess; patient reports that he had instrumented his rectum prior to hospitalization with enema     8/3/19 urinary retention overnight requiring in/out catheterization per patient.  Creat climb     8/4/19 further creat climb; childs placed and lisinopril stopped and d/w Dr Rubio;  ?obstruction initially associated with retention postop (1200 out with I/O cath postop per nursing) -v- contrast from CT -v- lisinopril/medication/vancomycin -v- relative hypotension -v- likely combination of factors with ATN; nephrology following; vancomycin trough high and vancomycin stopped empirically 8/3 although high trough potentially accumulation as a consequence of diminishing renal function associated with retention/contrast/pressure rather than cause.  Nonetheless, avoid for  "now; creatinine trending down.        8/7 in retrospect he remembers air in his urine at admit which has raised concern for possible fistula from urinary tract;  No fecaluria and culture from abscess is fecal sherly;  ?rectal-urethral fistula;  Dr Payne aware     Culture with E. coli/Klebsiella species and creatinine generally trended down, transitioned to oral antibiotics at discharge.     Readmitted August 17, 2019 with nausea/vomiting/dry heaves for 3 days.  Walker catheter was placed and CT scan of the abdomen showed:      \"Previously seen fluid collection identified inferior to the prostate gland is more increased in density on today's examination. There is wall thickening again seen throughout the bladder. Findings again are concerning for cystitis.\" per radiology     He was transitioned to oral omnicef/topical antifungal on approx 8/23/19; Noncompliant with f/u in our office     READMITTED 10/8/19 RLQ abd pain, urinary retention, nausea, dysuria and generalized fatigue     UTI by U/A, subsequent blood cultures positive for GNR and initial PCR with kleb sp, no carbapenem resistance by initial PCR per my d/w micro;  Abnormal CT abd with right perinephric fluid collection, advanced cirrhosis, urinary bladder thickening.  10/9 Aspirate of perinephric fluid collection consistent with abscess/gram-negative lon and white blood cells     10/16/19 cytoscopy with bullous change per Dr Guteirres but no fistula per him    10/19/19 intermittent nausea, no vomiting or dry heaves this am, intermittent dry cough broadened to zosyn with ?aspiration pneumonia evolving after dry heaves/vomiting    10/21/19 feeling better per him; cough less and dry;  No hemoptysis; denies new pain;   no diarrhea; He currently denies abd pain; no overt hematuria or pyuria;  He denied any  fecaluria prior to admit to me.       No headache photophobia or neck stiffness.  No shortness of breath or hemoptysis.   No hematochezia melena or hematemesis.  No " "skin rash.     ROS:      10/21/19 No f/c/s. No n/v/d. No rash. No new ADR to Abx.     Constitutional-- No Fever, chills or sweats.  Appetite diminished with fatigue  Heent-- No new vision, hearing or throat complaints.  No epistaxis or oral sores.  Denies odynophagia or dysphagia.  No flashers, floaters or eye pain. No odynophagia or dysphagia. No headache, photophobia or neck stiffness.  CV-- No chest pain, palpitation or syncope  Resp-- as above  GI-  No hematochezia, melena, or hematemesis. Denies jaundice.  -- No dysuria, hematuria, or flank pain.  Denies hesitancy, urgency or flank pain.  Lymph- no swollen lymph nodes in neck/axilla or groin.   Heme- No active bruising or bleeding; no Hx of DVT or PE.  MS-- no swelling or pain in the bones or joints of arms/legs.  No new back pain.  Neuro-- No acute focal weakness or numbness in the arms or legs.  No seizures.     Full 12 point review of systems reviewed and negative otherwise for acute complaints, except for above      PE:   /80 (BP Location: Left arm, Patient Position: Lying)   Pulse 81   Temp 97.8 °F (36.6 °C) (Oral)   Resp 17   Ht 190.5 cm (75\")   Wt 112 kg (246 lb)   SpO2 96%   BMI 30.75 kg/m²     GENERAL: awake, in no acute distress.   HEENT: Normocephalic, atraumatic.  PERRL. EOMI. No conjunctival injection. No icterus. Oropharynx clear without evidence of thrush or exudate. No evidence of peridontal disease.    NECK: Supple without nuchal rigidity. No mass.  LYMPH: No cervical, axillary or inguinal lymphadenopathy.  HEART: RRR; No murmur, rubs, gallops.   LUNGS: Diminished at bases bilaterally with scattered rhonchi. Normal respiratory effort. Nonlabored. No dullness.  ABDOMEN: Soft, nontender, nondistended. Positive bowel sounds. No rebound or guarding. NO mass or HSM.  EXT:  No cyanosis, clubbing or edema. No cord.  : no new findings; +childs  MSK: FROM without joint effusions noted arms/legs.    SKIN: Warm and dry without cutaneous " eruptions on Inspection/palpation.    NEURO: awake     Amputee status noted with left BKA     No CVA tenderness     No peripheral stigmata/phenomena of endocarditis    Laboratory Data    Results from last 7 days   Lab Units 10/21/19  0634 10/20/19  0536 10/19/19  0749   WBC 10*3/mm3 15.94* 16.90* 18.75*   HEMOGLOBIN g/dL 9.1* 8.4* 9.1*   HEMATOCRIT % 28.9* 27.7* 29.1*   PLATELETS 10*3/mm3 375 338 352     Results from last 7 days   Lab Units 10/21/19  0634   SODIUM mmol/L 140   POTASSIUM mmol/L 3.8   CHLORIDE mmol/L 104   CO2 mmol/L 28.0   BUN mg/dL 8   CREATININE mg/dL 0.45*   GLUCOSE mg/dL 106*   CALCIUM mg/dL 8.2*     Results from last 7 days   Lab Units 10/21/19  0634   ALK PHOS U/L 246*   BILIRUBIN mg/dL <0.2*   ALT (SGPT) U/L 15   AST (SGOT) U/L 19               Estimated Creatinine Clearance: 262.3 mL/min (A) (by C-G formula based on SCr of 0.45 mg/dL (L)).      Microbiology:      Radiology:  Imaging Results (last 72 hours)     Procedure Component Value Units Date/Time    CT Guided Abscess Drain Retroperitoneal [[id number]] Collected:  10/09/19 1611     Updated:  10/09/19 1715    Narrative:       EXAMINATION: CT GUIDED ABSCESS DRAIN RETROPERITONEAL- 10/09/2019     INDICATION: N15.1-Renal and perinephric abscess; A41.9-Sepsis,  unspecified organism; E86.0-Dehydration; E11.65-Type 2 diabetes mellitus  with hyperglycemia; I10-Essential (primary) hypertension;  Z86.14-Personal history of Methicillin resistant Staphylococcus aureus  infection; K74.60-Unspecified cirrhosis of liver; fluid collection     TECHNIQUE: CT-guided drainage of a perinephric fluid collection     The radiation dose reduction device was turned on for each scan per the  ALARA (As Low as Reasonably Achievable) protocol.     COMPARISON: NONE     FINDINGS: Patient referred for CT-guided drainage of a perinephric fluid  collection. Procedure and risks were explained to the patient. Informed  consent was obtained and signed. Patient placed in the  left lateral  position upon the table. The right flank was prepped and draped in a  sterile fashion. 1% lidocaine used as a local anesthetic. Under CT  fluoroscopic guidance an 18-gauge 15 cm Chiba needle was advanced into  the perinephric fluid collection of approximately 10 ml of a reddish  fluid was removed with some debris within the fluid. There is a striated  delayed nephrogram seen of the right kidney suggesting possible  pyelonephritis with surrounding stranding of the perinephric fat.  Myelolipoma seen on the adrenal gland on the left. Chiba needle was  removed and no immediate complication occurred. Slight decrease seen in  size of the perinephric fluid collection status post drainage.       Impression:       CT-guided drainage of a perinephric fluid collection with no  immediate complication.     D:  10/09/2019  E:  10/09/2019     This report was finalized on 10/9/2019 5:12 PM by Dr. Nella Enriquez MD.       CT Abdomen Pelvis With Contrast [[id number]] Collected:  10/08/19 2202     Updated:  10/08/19 2222    Narrative:       CT ABDOMEN AND PELVIS WITH CONTRAST, 10/8/2019    HISTORY:  49-year-old male in the ED complaining of 2 day history right side abdomen pain, nausea, vomiting and generalized weakness. History of hepatic cirrhosis (DUNLAP). History of diabetes with left BKA and multiple abdominal wall abscesses and lower extremity  cellulitis in 2018.    TECHNIQUE:  CT imaging of the abdomen and pelvis with IV contrast. Radiation dose reduction techniques included automated exposure control. Radiation audit for CT and nuclear cardiology exams in the last 12 months: 7.    COMPARISON:  *  CT abdomen/pelvis, 8/18/2019.    ABDOMEN FINDINGS:  Images show a right lower perinephric fluid collection that is either subcapsular or pericapsular. This extends along the lower kidney for a length of about 8.5 cm and has a maximum thickness of 2.5 cm. The fluid is mixed attenuation and may be  hemorrhagic.  Correlate for any recent history of blunt trauma to the flank. Renal parenchyma shows no disruption or abnormal enhancement. There is no fracture of overlying right lower posterior ribs or transverse spinous processes. Given the history,  perinephric abscess is possible but is significantly less likely given normal renal parenchymal enhancement. No evidence of urinary obstruction. No visible nephrolithiasis.    Advanced hepatic cirrhosis. Mild splenomegaly. No suspicious liver lesion. No upper abdominal ascites. Hepatic vasculature appears patent. No gallbladder distention or bile duct dilatation. Negative pancreas.    Benign left adrenal myelolipoma measuring about 2.9 cm. Normal caliber abdominal aorta. Small bowel and colon are normal in caliber and appearance, as imaged. The appendix is normal. No gastric distention or distal esophageal dilatation.    PELVIS FINDINGS:  Air within the urinary bladder, likely iatrogenic. There is at least mild diffuse bladder wall thickening. Prostate and rectum are unremarkable. No inguinal hernia.    Lung base images show multiple benign calcified granulomas.      Impression:       1.  Small mixed attenuation right perinephric fluid collection along the inferior capsule, not present on the prior exam. Perinephric hematoma is a consideration. Correlate clinically for any history of blunt flank trauma. While abscess is possible, this  is unlikely as right renal parenchyma enhances normally with no evidence of pyelonephritis. Laboratory correlation recommended.  2.  Advanced hepatic cirrhosis. Splenomegaly.  3.  Urinary bladder wall thickening. Gas within the bladder is most likely iatrogenic.  4.  Benign left adrenal myelolipoma.    Signer Name: Fadi Healy MD   Signed: 10/8/2019 10:02 PM   Workstation Name: [id number]    [facility]    XR Chest PA & Lateral [[id number]] Collected:  10/08/19 2210     Updated:  10/08/19 2212    Narrative:        CHEST X-RAY, 10/8/2019      HISTORY:    49-year-old male in the ED with reported diabetic ketoacidosis.      TECHNIQUE:  AP portable upright chest series.      FINDINGS:  Heart size and pulmonary vascularity are normal. The lungs are expanded and clear. No visible pulmonary infiltrate or pleural effusion. Lower lung benign calcified granulomas.      Impression:       Negative chest.    Signer Name: Fadi Healy MD   Signed: 10/8/2019 10:10 PM   Workstation Name: [email or web address]    [facility]            Impression:      --Acute Kleb septicemia/sepsis, likely urinary source/pyelnoephritis associated with urinary retention and  with abnormal CT scan with perinephric collection/abscess and Kleb on aspirate;  IV zosyn ongoing; urology to determine any timing/option or threshold for further intervention or functional/anatomic assessment.    --Acute perinephric abscess as above;  Likely to need repeat imaging to assess for progress -v- other sequelae; d/w medicine ;  Urology following to help guide timing/option/threshold for further drainage (perc -v- other)    --abnormal imaging with dry cough and probable pneumonia;  At risk for aspiration with recent vomiting/dry heaves and empiric zosyn started 10/19;  If productive cough, then send for culture; septic pulm emboli with kleb less likely    --acute loose stools/intermittent diarrhea improved per him; with Hx CDiff per him/family;  Empiric oral vancomycin added with high risk for relapse     --Hx perirectal abscess ; Colorectal surgery, Dr. Payne previously saw and is following.  Drainage as above on August 2 with mxed Fecal sherly in culture; CT scans  with no mention of abscess on 8/18 study;  Dr Payne  following given urology conern for fistula and to help determine any timing/option or threshold for further GI/colorectal work-up     --History urinary retention.  urology following     --Hx ARF in August 2019;  ?obstruction initially  associated with retention postop (1200 out with I/O cath postop per nursing) -v- contrast from CT -v- lisinopril/medication/vancomycin -v- relative hypotension -v- likely combination of factors with ATN;  vancomycin trough high and vancomycin stopped empirically 8/3 although high trough potentially accumulation as a consequence of diminishing renal function associated with retention/contrast/pressure rather than cause.  Nonetheless, avoid for now; likely further acute kidney injury at admission August 17 associated with dehydration/prerenal/ATN etiology     --History MRSA;  Not in current culture     --Diabetes mellitus type 2, uncontrolled.  He reports the reason for this is forgetfulness     --History [place] and chronic liver disease/cirrhosis  per past notes     --Left BKA     --Peripheral vascular disease     --medical noncompliance    PLAN:     --IV zosyn/oral vancomycin      --Check/review labs cultures and scans     --Discussed with microbiology     --History per nursing staff     --Discussed with Dr Payne     --Discussed with family, partial history per them     --Highly complex set of issues with high risk for further serious morbidity and other serious sequela     --Colorectal surgery and urology to follow    -- D/w Dr Vora    **intermittent nausea/dry heaves 10/18;  abd exam benign;  S/p repeat CT with persistent collection that may require repeat aspiration/drainage although urology prefers hold off and continue current abx for now;  Empiric broadening of abx 10/19 as above with aspiration risk and probable pneumonia      Usman Dong MD  10/21/2019

## 2019-10-22 LAB
ANION GAP SERPL CALCULATED.3IONS-SCNC: 9 MMOL/L (ref 5–15)
BUN BLD-MCNC: 11 MG/DL (ref 6–20)
BUN/CREAT SERPL: 23.4 (ref 7–25)
CALCIUM SPEC-SCNC: 8.2 MG/DL (ref 8.6–10.5)
CHLORIDE SERPL-SCNC: 103 MMOL/L (ref 98–107)
CO2 SERPL-SCNC: 29 MMOL/L (ref 22–29)
CREAT BLD-MCNC: 0.47 MG/DL (ref 0.76–1.27)
DEPRECATED RDW RBC AUTO: 45.1 FL (ref 37–54)
ERYTHROCYTE [DISTWIDTH] IN BLOOD BY AUTOMATED COUNT: 14.3 % (ref 12.3–15.4)
GFR SERPL CREATININE-BSD FRML MDRD: >150 ML/MIN/1.73
GLUCOSE BLD-MCNC: 129 MG/DL (ref 65–99)
GLUCOSE BLDC GLUCOMTR-MCNC: 102 MG/DL (ref 70–130)
GLUCOSE BLDC GLUCOMTR-MCNC: 131 MG/DL (ref 70–130)
GLUCOSE BLDC GLUCOMTR-MCNC: 141 MG/DL (ref 70–130)
GLUCOSE BLDC GLUCOMTR-MCNC: 176 MG/DL (ref 70–130)
HCT VFR BLD AUTO: 29.3 % (ref 37.5–51)
HGB BLD-MCNC: 9.1 G/DL (ref 13–17.7)
MCH RBC QN AUTO: 26.9 PG (ref 26.6–33)
MCHC RBC AUTO-ENTMCNC: 31.1 G/DL (ref 31.5–35.7)
MCV RBC AUTO: 86.7 FL (ref 79–97)
PLATELET # BLD AUTO: 369 10*3/MM3 (ref 140–450)
PMV BLD AUTO: 10.6 FL (ref 6–12)
POTASSIUM BLD-SCNC: 3.8 MMOL/L (ref 3.5–5.2)
RBC # BLD AUTO: 3.38 10*6/MM3 (ref 4.14–5.8)
SODIUM BLD-SCNC: 141 MMOL/L (ref 136–145)
WBC NRBC COR # BLD: 15.49 10*3/MM3 (ref 3.4–10.8)

## 2019-10-22 PROCEDURE — 63710000001 INSULIN DETEMIR PER 5 UNITS: Performed by: PHYSICIAN ASSISTANT

## 2019-10-22 PROCEDURE — 25010000002 PIPERACILLIN SOD-TAZOBACTAM PER 1 G: Performed by: INTERNAL MEDICINE

## 2019-10-22 PROCEDURE — 99232 SBSQ HOSP IP/OBS MODERATE 35: CPT | Performed by: NURSE PRACTITIONER

## 2019-10-22 PROCEDURE — 80048 BASIC METABOLIC PNL TOTAL CA: CPT | Performed by: INTERNAL MEDICINE

## 2019-10-22 PROCEDURE — 82962 GLUCOSE BLOOD TEST: CPT

## 2019-10-22 PROCEDURE — 85027 COMPLETE CBC AUTOMATED: CPT | Performed by: INTERNAL MEDICINE

## 2019-10-22 RX ADMIN — TAZOBACTAM SODIUM AND PIPERACILLIN SODIUM 3.38 G: 375; 3 INJECTION, SOLUTION INTRAVENOUS at 17:11

## 2019-10-22 RX ADMIN — Medication 250 MG: at 09:37

## 2019-10-22 RX ADMIN — TAZOBACTAM SODIUM AND PIPERACILLIN SODIUM 3.38 G: 375; 3 INJECTION, SOLUTION INTRAVENOUS at 01:07

## 2019-10-22 RX ADMIN — SODIUM CHLORIDE 100 ML/HR: 9 INJECTION, SOLUTION INTRAVENOUS at 14:48

## 2019-10-22 RX ADMIN — AMLODIPINE BESYLATE 10 MG: 10 TABLET ORAL at 09:37

## 2019-10-22 RX ADMIN — INSULIN DETEMIR 20 UNITS: 100 INJECTION, SOLUTION SUBCUTANEOUS at 22:17

## 2019-10-22 RX ADMIN — NYSTATIN: 100000 POWDER TOPICAL at 09:36

## 2019-10-22 RX ADMIN — METOPROLOL TARTRATE 100 MG: 100 TABLET ORAL at 22:17

## 2019-10-22 RX ADMIN — INSULIN DETEMIR 20 UNITS: 100 INJECTION, SOLUTION SUBCUTANEOUS at 09:38

## 2019-10-22 RX ADMIN — VANCOMYCIN HYDROCHLORIDE 125 MG: KIT at 05:36

## 2019-10-22 RX ADMIN — VANCOMYCIN HYDROCHLORIDE 125 MG: KIT at 17:09

## 2019-10-22 RX ADMIN — SODIUM CHLORIDE, PRESERVATIVE FREE 10 ML: 5 INJECTION INTRAVENOUS at 22:19

## 2019-10-22 RX ADMIN — SODIUM CHLORIDE, PRESERVATIVE FREE 10 ML: 5 INJECTION INTRAVENOUS at 22:32

## 2019-10-22 RX ADMIN — DULOXETINE HYDROCHLORIDE 30 MG: 30 CAPSULE, DELAYED RELEASE ORAL at 09:37

## 2019-10-22 RX ADMIN — VANCOMYCIN HYDROCHLORIDE 125 MG: KIT at 12:43

## 2019-10-22 RX ADMIN — RENAGEL 800 MG: 800 TABLET ORAL at 17:09

## 2019-10-22 RX ADMIN — TAZOBACTAM SODIUM AND PIPERACILLIN SODIUM 3.38 G: 375; 3 INJECTION, SOLUTION INTRAVENOUS at 09:37

## 2019-10-22 RX ADMIN — Medication 250 MG: at 22:18

## 2019-10-22 RX ADMIN — INSULIN LISPRO 4 UNITS: 100 INJECTION, SOLUTION INTRAVENOUS; SUBCUTANEOUS at 09:36

## 2019-10-22 RX ADMIN — RENAGEL 800 MG: 800 TABLET ORAL at 12:44

## 2019-10-22 RX ADMIN — INSULIN LISPRO 4 UNITS: 100 INJECTION, SOLUTION INTRAVENOUS; SUBCUTANEOUS at 17:09

## 2019-10-22 RX ADMIN — SODIUM CHLORIDE 100 ML/HR: 9 INJECTION, SOLUTION INTRAVENOUS at 17:11

## 2019-10-22 RX ADMIN — INSULIN LISPRO 4 UNITS: 100 INJECTION, SOLUTION INTRAVENOUS; SUBCUTANEOUS at 12:44

## 2019-10-22 RX ADMIN — SODIUM CHLORIDE, PRESERVATIVE FREE 10 ML: 5 INJECTION INTRAVENOUS at 09:38

## 2019-10-22 RX ADMIN — INSULIN LISPRO 2 UNITS: 100 INJECTION, SOLUTION INTRAVENOUS; SUBCUTANEOUS at 22:18

## 2019-10-22 RX ADMIN — NYSTATIN: 100000 POWDER TOPICAL at 22:19

## 2019-10-22 RX ADMIN — METOPROLOL TARTRATE 100 MG: 100 TABLET ORAL at 09:37

## 2019-10-22 RX ADMIN — TERAZOSIN HYDROCHLORIDE ANHYDROUS 5 MG: 5 CAPSULE ORAL at 22:17

## 2019-10-22 NOTE — PROGRESS NOTES
"    [facility] Medicine Services  PROGRESS NOTE    Patient Name: Kendrick Crystal  : 1970  MRN: [id number]    Date of Admission: 10/8/2019  Primary Care Physician: No primary care provider on file.    Subjective   Subjective     CC:  Follow-up bacteremia and perinephric abscess    HPI:  Resting in bed. NAD. Wife in room. States he was \"lifted\" to the [facility] yesterday, but is not bearing weight due to his prosthetic leg not fitting well. No pain.    Review of Systems  No current fevers or chills  No current shortness of breath or cough  No current nausea, vomiting, or diarrhea  No current chest pain or palpitations      Objective   Objective     Vital Signs:   Temp:  [98.1 °F (36.7 °C)-98.3 °F (36.8 °C)] 98.1 °F (36.7 °C)  Heart Rate:  [83-84] 83  Resp:  [16-18] 18  BP: (160-171)/() 171/94        Physical Exam:  Constitutional:Awake, alert  HENT: NCAT, mucous membranes moist, neck supple  Respiratory: Clear to auscultation bilaterally, respiratory effort normal, nonlabored breathing   Cardiovascular: RRR, S1, S2, normal radial pulses  Gastrointestinal: Positive bowel sounds, soft, nontender, nondistended  Musculoskeletal: Debilitated in appearance, obese, left below the knee amputation, trace right lower extremity edema   Psychiatric: Appropriate affect, cooperative  Neurologic: No slurred speech or facial droop, follows commands  Skin: No rashes or jaundice, warm  PICC RUE      Results Reviewed:    Results from last 7 days   Lab Units 10/22/19  0514 10/21/19  0634 10/20/19  0536   WBC 10*3/mm3 15.49* 15.94* 16.90*   HEMOGLOBIN g/dL 9.1* 9.1* 8.4*   HEMATOCRIT % 29.3* 28.9* 27.7*   PLATELETS 10*3/mm3 369 375 338     Results from last 7 days   Lab Units 10/22/19  0514 10/21/19  0634 10/20/19  0536 10/19/19  0749   SODIUM mmol/L 141 140 138 139   POTASSIUM mmol/L 3.8 3.8 3.9 3.7   CHLORIDE mmol/L 103 104 107 110*   CO2 mmol/L 29.0 28.0 24.0 20.0*   BUN mg/dL 11 8 11 14   CREATININE mg/dL " 0.47* 0.45* 0.47* 0.50*   GLUCOSE mg/dL 129* 106* 199* 96   CALCIUM mg/dL 8.2* 8.2* 7.7* 7.9*   ALT (SGPT) U/L  --  15 13 13   AST (SGOT) U/L  --  19 17 13     Estimated Creatinine Clearance: 251.2 mL/min (A) (by C-G formula based on SCr of 0.47 mg/dL (L)).    Microbiology Results Abnormal     Procedure Component Value - Date/Time    Fungus Culture - Body Fluid, Peritoneum [[id number]] Collected:  10/09/19 1508    Lab Status:  Preliminary result Specimen:  Body Fluid from Peritoneum Updated:  10/16/19 1704     Fungus Culture No fungus isolated at 1 week    Anaerobic Culture - Body Fluid, Peritoneum [[id number]] Collected:  10/09/19 1508    Lab Status:  Final result Specimen:  Body Fluid from Peritoneum Updated:  10/14/19 0935     Anaerobic Culture No anaerobes isolated at 5 days    Urine Culture - Urine, Urine, Clean Catch [[id number]]  (Abnormal)  (Susceptibility) Collected:  10/08/19 2256    Lab Status:  Final result Specimen:  Urine, Clean Catch Updated:  10/12/19 0931     Urine Culture >100,000 CFU/mL Klebsiella pneumoniae ssp pneumoniae      >100,000 CFU/mL Escherichia coli    Susceptibility      Klebsiella pneumoniae ssp pneumoniae     EYAD     Ampicillin Resistant     Ampicillin + Sulbactam Susceptible     Cefazolin Susceptible     Cefepime Susceptible     Ceftazidime Susceptible     Ceftriaxone Susceptible     Gentamicin Susceptible     Levofloxacin Susceptible     Nitrofurantoin Intermediate     Piperacillin + Tazobactam Susceptible     Tetracycline Susceptible     Trimethoprim + Sulfamethoxazole Susceptible                Susceptibility      Escherichia coli     EYAD     Ampicillin Resistant     Ampicillin + Sulbactam Intermediate     Cefazolin Susceptible     Cefepime Susceptible     Ceftazidime Susceptible     Ceftriaxone Susceptible     Gentamicin Susceptible     Levofloxacin Resistant     Nitrofurantoin Susceptible     Piperacillin + Tazobactam Susceptible     Tetracycline Susceptible     Trimethoprim +  Sulfamethoxazole Susceptible                    Body Fluid Culture - Body Fluid, Peritoneum [[id number]]  (Abnormal)  (Susceptibility) Collected:  10/09/19 1508    Lab Status:  Final result Specimen:  Body Fluid from Peritoneum Updated:  10/11/19 0621     Body Fluid Culture Moderate growth (3+) Klebsiella pneumoniae ssp pneumoniae     Gram Stain Many (4+) WBCs per low power field      Moderate (3+) Gram negative bacilli    Susceptibility      Klebsiella pneumoniae ssp pneumoniae     EYAD     Ampicillin Resistant     Ampicillin + Sulbactam Susceptible     Cefazolin Susceptible     Cefepime Susceptible     Ceftazidime Susceptible     Ceftriaxone Susceptible     Gentamicin Susceptible     Levofloxacin Susceptible     Piperacillin + Tazobactam Susceptible     Trimethoprim + Sulfamethoxazole Susceptible                    Blood Culture - Blood, Arm, Right [[id number]]  (Abnormal) Collected:  10/08/19 2012    Lab Status:  Final result Specimen:  Blood from Arm, Right Updated:  10/10/19 2122     Blood Culture Klebsiella pneumoniae ssp pneumoniae     Isolated from Aerobic and Anaerobic Bottles     Gram Stain Aerobic Bottle Gram negative bacilli      Anaerobic Bottle Gram negative bacilli    Narrative:       Refer to blood culture collected 10/8/2019 at 2000 for MICs.    Blood Culture - Blood, Arm, Left [[id number]]  (Abnormal)  (Susceptibility) Collected:  10/08/19 2000    Lab Status:  Final result Specimen:  Blood from Arm, Left Updated:  10/10/19 2122     Blood Culture Klebsiella pneumoniae ssp pneumoniae     Isolated from Aerobic and Anaerobic Bottles     Gram Stain Anaerobic Bottle Gram negative bacilli      Aerobic Bottle Gram negative bacilli    Susceptibility      Klebsiella pneumoniae ssp pneumoniae     EYAD     Ampicillin Resistant     Ampicillin + Sulbactam Susceptible     Cefazolin Susceptible     Cefepime Susceptible     Ceftazidime Susceptible     Ceftriaxone Susceptible     Gentamicin Susceptible     Levofloxacin  Susceptible     Piperacillin + Tazobactam Susceptible     Trimethoprim + Sulfamethoxazole Susceptible                    Blood Culture ID, PCR - Blood, Arm, Left [[id number]]  (Abnormal) Collected:  10/08/19 2000    Lab Status:  Final result Specimen:  Blood from Arm, Left Updated:  10/09/19 1038     BCID, PCR Klebsiella pneumoniae. Identification by BCID PCR.          Imaging Results (last 24 hours)     ** No results found for the last 24 hours. **          Results for orders placed during the hospital encounter of 05/18/19   Adult Transthoracic Echo Complete W/ Cont if Necessary Per Protocol    Narrative · Estimated EF = 60%.  · Mild mitral valve regurgitation is present  · Mild tricuspid valve regurgitation is present.  · Calculated right ventricular systolic pressure from tricuspid   regurgitation is 29 mmHg.          I have reviewed the medications:  Scheduled Meds:    amLODIPine 10 mg Oral Q24H   DULoxetine 30 mg Oral Daily   insulin detemir 20 Units Subcutaneous Q12H   insulin lispro 0-9 Units Subcutaneous 4x Daily With Meals & Nightly   insulin lispro 4 Units Subcutaneous TID With Meals   metoprolol tartrate 100 mg Oral Q12H   nystatin  Topical Q12H   piperacillin-tazobactam 3.375 g Intravenous Q8H   saccharomyces boulardii 250 mg Oral BID   sevelamer 800 mg Oral TID With Meals   sodium chloride 10 mL Intravenous Q12H   sodium chloride 10 mL Intravenous Q12H   terazosin 5 mg Oral Nightly   vancomycin 125 mg Oral Q6H     Continuous Infusions:    sodium chloride 100 mL/hr Last Rate: 100 mL/hr (10/21/19 1030)     PRN Meds:.•  acetaminophen **OR** acetaminophen **OR** acetaminophen  •  dextrose  •  dextrose  •  glucagon (human recombinant)  •  HYDROcodone-acetaminophen  •  magnesium sulfate **OR** magnesium sulfate **OR** magnesium sulfate  •  melatonin  •  ondansetron  •  potassium chloride **OR** potassium chloride **OR** potassium chloride  •  sodium chloride  •  sodium chloride  •  witch  angy-glycerin      Assessment/Plan   Assessment / Plan     Active Hospital Problems    Diagnosis  POA   • **Perinephric abscess [N15.1]  Yes   • Bacteremia due to Klebsiella pneumoniae [R78.81]  Unknown   • Acute UTI (urinary tract infection) [N39.0]  Yes   • Gastroparesis [K31.84]  Yes   • S/P BKA (below knee amputation), left (CMS/HCC) [Z89.512]  Not Applicable   • Type 2 diabetes mellitus with circulatory disorder and uncontrolled [E11.59]  Yes   • JOHNSTON Cirrhosis [K74.60]  Yes   • Essential hypertension [I10]  Yes   • Hyperlipemia [E78.5]  Yes   • Non-compliance [Z91.14]  Not Applicable      Resolved Hospital Problems   No resolved problems to display.        Brief Hospital Course to date:  Kendrick Crystal is a 49 y.o. male history of diabetes, hypertension, previous skin infections with MRSA, Johnston cirrhosis, peripheral vascular disease status post left below the knee amputation who presents with sepsis due to renal and perinephric abscess status post drainage by interventional radiology with positive cultures of Klebsiella pneumoniae.  Urinary tract infection with Klebsiella and E. coli.  Klebsiella bacteremia.    Sepsis secondary to Klebsiella perinephric abscess, Klebsiella and E. coli urinary tract infection, Klebsiella bacteremia:  -Continue treatment with Zosyn.  -Urology has completed cystoscopy, no appearance of colovesical fistula  -Clinically responding to antibiotics and leukocytosis slowly trending down.    Urine retention  --initially had childs placed by Dr Gutierres on admission/ c/o pneumaturia   --cystoscopy showed no anatomical abnormality, and no colovesical fistula that was suspected   --childs remains and Terazosin started   --will start bladder training today with childs removal today, discussed with nursing  --will bladder scan in two hours after removal due to amount of urine output to ensure no retaining large volumes  --stopped IVF     History of C. difficile colitis with intermittently loose  stool:  -Continuing prophylactic oral vancomycin.  No current diarrhea reported.    Essential hypertension:  -Blood pressure mildly elevated.  Continue current regimen and monitor.  Avoid hypotension in the setting of infection.    Type 2 diabetes mellitus with diabetic peripheral polyneuropathy: A1c 14.1  Severely uncontrolled on outpatient basis.  Currently on basal with prandial and correction.  Glucose appears to be well controlled.  Monitor glucose and adjust further if needed.    Weakness/debility/below the knee amputation:  Patient refuses therapy and refuses placement at discharge.  He has a power chair at the bedside.  He reports his current prosthesis does not fit and reports he is to receive a new one soon.  Encouraging him to work with therapy and mobilize however he has not interested.  Continue daily counseling.    Johnston cirrhosis: Currently appears reasonably compensated.  Monitor.  No encephalopathy    Gastroparesis: Tolerating diet.  Should improve with improved glucose control.    Case discussed with infectious disease.  Plan discussed with urology by partner regarding abscess and with further drainage as needed.     Addendum: Case discussed with urology.  With leukocyte count improving they recommend to hold the course of antibiotic for now and hold off on further invasive drainage at the time being.  They plan to reassess at some point this week.           DVT Prophylaxis: Mechanical for now    Disposition: I expect the patient to be discharged home once cleared by infectious disease with     CODE STATUS:   Code Status and Medical Interventions:   Ordered at: 10/08/19 8644     Level Of Support Discussed With:    Patient     Code Status:    CPR     Medical Interventions (Level of Support Prior to Arrest):    Full         Electronically signed by CHINTAN Martin, 10/22/19, 11:30 AM.

## 2019-10-22 NOTE — PROGRESS NOTES
Continued Stay Note  [facility]     Patient Name: Kendrick Crystal  MRN: [id number]  Today's Date: 10/22/2019    Admit Date: 10/8/2019    Discharge Plan     Row Name 10/22/19 1554       Plan    Plan  TBD    Patient/Family in Agreement with Plan  yes    Plan Comments  Spoke with patient and his wife.  Patient is discouraged and appears depressed. He stated he is tired of being in the hospital.  He continues to refuse SNF.  Encouraged him to get out of bed as much as possible.  Discussed in rounds, final antibiotic regimen along with patient wishes will determine final discharge destination.  Will follow up in am.     Final Discharge Disposition Code  30 - still a patient        Discharge Codes    No documentation.       Expected Discharge Date and Time     Expected Discharge Date Expected Discharge Time    Oct 23, 2019             Barb Batista RN

## 2019-10-22 NOTE — PLAN OF CARE
Problem: Fall Risk (Adult)  Goal: Absence of Fall  Outcome: Ongoing (interventions implemented as appropriate)   10/22/19 0447   Fall Risk (Adult)   Absence of Fall making progress toward outcome       Problem: Pain, Acute (Adult)  Goal: Acceptable Pain Control/Comfort Level  Outcome: Ongoing (interventions implemented as appropriate)   10/22/19 0447   Pain, Acute (Adult)   Acceptable Pain Control/Comfort Level making progress toward outcome

## 2019-10-22 NOTE — PROGRESS NOTES
[facility] Progress Note        Antibiotics:  Anti-Infectives (From admission, onward)    Ordered     Dose/Rate Route Frequency Start Stop    10/19/19 0957  piperacillin-tazobactam (ZOSYN) 3.375 g in iso-osmotic dextrose 50 ml (premix)     Ordering Provider:  Usman Dong MD    3.375 g  over 4 Hours Intravenous Every 8 Hours 10/19/19 1700 10/29/19 1659    10/19/19 0957  piperacillin-tazobactam (ZOSYN) 3.375 g in iso-osmotic dextrose 50 ml (premix)     Ordering Provider:  Usman Dong MD    3.375 g  over 30 Minutes Intravenous Once 10/19/19 1045 10/19/19 1257    10/15/19 1127  vancomycin oral solution reconstituted 125 mg     [facility] reviewed the order on 10/17/19 1241.   Ordering Provider:  Usman Dong MD    125 mg Oral Every 6 Hours Scheduled 10/15/19 1215 10/29/19 1159    10/09/19 1136  meropenem (MERREM) 1 g/100 mL 0.9% NS VTB (mbp)     Ordering Provider:  Usman Dong MD    1 g  over 30 Minutes Intravenous Once 10/09/19 1230 10/09/19 1251    10/08/19 2338  piperacillin-tazobactam (ZOSYN) 3.375 g in iso-osmotic dextrose 50 ml (premix)     Ordering Provider:  Yasmany Martinez PA-C    3.375 g  over 30 Minutes Intravenous Once 10/08/19 2340 10/09/19 0016          CC: fatigue    HPI:  Patient is a 49 y.o.  Yr old male with history of poorly contolled T2DM, DUNLAP/liver cirrhosis, HTN, PVD/Left BKA, and multiple skin abscesses/MRSA who presented to [facility] ED with right shin wound infection summer 2018.  He was unable to get to see Dr. Martinez as his father passed away unexpectedly on 18 and he had to wait until after the .  The wound worsened becoming gangrenous and he came to [facility] ED in 2018.  [facility]  for a severe penis/scrotum infection requiring surgical debridement in summer 2018.  He received antibiotics regarding his right leg infection, generally improved over the remainder of  summer 2018 but that area had not completely healed. He was admitted April 24, 2019 with acute left lower abdominal wall redness/swelling and pain, spontaneous drainage associated with fever/chills and uncontrolled blood sugars.  4/26 I&D requiring wide debridement , severe/deep infection and transferred to [facility] on May 3 with IV Zosyn/oral doxycycline. Cultures had lactobacillus and mixed gram-positive skin sherly including alpha strep, staph epidermidis and corynebacterium. Readmitted to [facility] May 18, 2019, increasing generalized edema ultimately transitioned to oral doxycycline and healed.     He presented to the emergency room July 30, 2019 with acute rectal pain that had begun 7/27; this is associated with constipation for days, chills and nausea/dry heaving.  White blood cell count elevated, high hemoglobin A1c over 13, urinalysis with hematuria/pyuria and CT scan with 3 x 3 cm fluid collection anterior to the distal rectum and per discussion with Dr. Akbar/Jennifer, this is not in the prostate.  Colorectal surgery/urology saw him for consideration of surgical options/timing/threshold, etc..     8/2/19 I&Dper Dr Payne perirectal abscess; patient reports that he had instrumented his rectum prior to hospitalization with enema     8/3/19 urinary retention overnight requiring in/out catheterization per patient.  Creat climb     8/4/19 further creat climb; childs placed and lisinopril stopped and d/w Dr Rubio;  ?obstruction initially associated with retention postop (1200 out with I/O cath postop per nursing) -v- contrast from CT -v- lisinopril/medication/vancomycin -v- relative hypotension -v- likely combination of factors with ATN; nephrology following; vancomycin trough high and vancomycin stopped empirically 8/3 although high trough potentially accumulation as a consequence of diminishing renal function associated with retention/contrast/pressure rather than cause.  Nonetheless, avoid for  "now; creatinine trending down.        8/7 in retrospect he remembers air in his urine at admit which has raised concern for possible fistula from urinary tract;  No fecaluria and culture from abscess is fecal sherly;  ?rectal-urethral fistula;  Dr Payne aware     Culture with E. coli/Klebsiella species and creatinine generally trended down, transitioned to oral antibiotics at discharge.     Readmitted August 17, 2019 with nausea/vomiting/dry heaves for 3 days.  Walker catheter was placed and CT scan of the abdomen showed:      \"Previously seen fluid collection identified inferior to the prostate gland is more increased in density on today's examination. There is wall thickening again seen throughout the bladder. Findings again are concerning for cystitis.\" per radiology     He was transitioned to oral omnicef/topical antifungal on approx 8/23/19; Noncompliant with f/u in our office     READMITTED 10/8/19 RLQ abd pain, urinary retention, nausea, dysuria and generalized fatigue     UTI by U/A, subsequent blood cultures positive for GNR and initial PCR with kleb sp, no carbapenem resistance by initial PCR per my d/w micro;  Abnormal CT abd with right perinephric fluid collection, advanced cirrhosis, urinary bladder thickening.  10/9 Aspirate of perinephric fluid collection consistent with abscess/gram-negative lon and white blood cells     10/16/19 cytoscopy with bullous change per Dr Gutierres but no fistula per him    10/19/19 intermittent nausea, no vomiting or dry heaves this am, intermittent dry cough broadened to zosyn with ?aspiration pneumonia evolving after dry heaves/vomiting    10/22/19 feeling better per him; cough less and dry;  No hemoptysis; denies new pain;   no diarrhea; He currently denies abd pain; no overt hematuria or pyuria;  He denied any  fecaluria prior to admit to me.       No headache photophobia or neck stiffness.  No shortness of breath or hemoptysis.   No hematochezia melena or hematemesis.  No " "skin rash.     ROS:      10/22/19 No f/c/s. No n/v/d. No rash. No new ADR to Abx.     Constitutional-- No Fever, chills or sweats.  Appetite diminished with fatigue  Heent-- No new vision, hearing or throat complaints.  No epistaxis or oral sores.  Denies odynophagia or dysphagia.  No flashers, floaters or eye pain. No odynophagia or dysphagia. No headache, photophobia or neck stiffness.  CV-- No chest pain, palpitation or syncope  Resp-- as above  GI-  No hematochezia, melena, or hematemesis. Denies jaundice.  -- No dysuria, hematuria, or flank pain.  Denies hesitancy, urgency or flank pain.  Lymph- no swollen lymph nodes in neck/axilla or groin.   Heme- No active bruising or bleeding; no Hx of DVT or PE.  MS-- no swelling or pain in the bones or joints of arms/legs.  No new back pain.  Neuro-- No acute focal weakness or numbness in the arms or legs.  No seizures.     Full 12 point review of systems reviewed and negative otherwise for acute complaints, except for above      PE:   /94 (BP Location: Left arm, Patient Position: Lying)   Pulse 83   Temp 98.1 °F (36.7 °C) (Oral)   Resp 18   Ht 190.5 cm (75\")   Wt 112 kg (246 lb)   SpO2 96%   BMI 30.75 kg/m²     GENERAL: awake, in no acute distress.   HEENT: Normocephalic, atraumatic.  PERRL. EOMI. No conjunctival injection. No icterus. Oropharynx clear without evidence of thrush or exudate. No evidence of peridontal disease.    NECK: Supple without nuchal rigidity. No mass.  LYMPH: No cervical, axillary or inguinal lymphadenopathy.  HEART: RRR; No murmur, rubs, gallops.   LUNGS: Diminished at bases bilaterally with scattered rhonchi. Normal respiratory effort. Nonlabored. No dullness.  ABDOMEN: Soft, nontender, nondistended. Positive bowel sounds. No rebound or guarding. NO mass or HSM.  EXT:  No cyanosis, clubbing or edema. No cord.  : no new findings; +childs  MSK: FROM without joint effusions noted arms/legs.    SKIN: Warm and dry without cutaneous " eruptions on Inspection/palpation.    NEURO: awake     Amputee status noted with left BKA     No CVA tenderness     No peripheral stigmata/phenomena of endocarditis    Laboratory Data    Results from last 7 days   Lab Units 10/22/19  0514 10/21/19  0634 10/20/19  0536   WBC 10*3/mm3 15.49* 15.94* 16.90*   HEMOGLOBIN g/dL 9.1* 9.1* 8.4*   HEMATOCRIT % 29.3* 28.9* 27.7*   PLATELETS 10*3/mm3 369 375 338     Results from last 7 days   Lab Units 10/22/19  0514   SODIUM mmol/L 141   POTASSIUM mmol/L 3.8   CHLORIDE mmol/L 103   CO2 mmol/L 29.0   BUN mg/dL 11   CREATININE mg/dL 0.47*   GLUCOSE mg/dL 129*   CALCIUM mg/dL 8.2*     Results from last 7 days   Lab Units 10/21/19  0634   ALK PHOS U/L 246*   BILIRUBIN mg/dL <0.2*   ALT (SGPT) U/L 15   AST (SGOT) U/L 19               Estimated Creatinine Clearance: 251.2 mL/min (A) (by C-G formula based on SCr of 0.47 mg/dL (L)).      Microbiology:      Radiology:  Imaging Results (last 72 hours)     Procedure Component Value Units Date/Time    CT Guided Abscess Drain Retroperitoneal [[id number]] Collected:  10/09/19 1611     Updated:  10/09/19 1715    Narrative:       EXAMINATION: CT GUIDED ABSCESS DRAIN RETROPERITONEAL- 10/09/2019     INDICATION: N15.1-Renal and perinephric abscess; A41.9-Sepsis,  unspecified organism; E86.0-Dehydration; E11.65-Type 2 diabetes mellitus  with hyperglycemia; I10-Essential (primary) hypertension;  Z86.14-Personal history of Methicillin resistant Staphylococcus aureus  infection; K74.60-Unspecified cirrhosis of liver; fluid collection     TECHNIQUE: CT-guided drainage of a perinephric fluid collection     The radiation dose reduction device was turned on for each scan per the  ALARA (As Low as Reasonably Achievable) protocol.     COMPARISON: NONE     FINDINGS: Patient referred for CT-guided drainage of a perinephric fluid  collection. Procedure and risks were explained to the patient. Informed  consent was obtained and signed. Patient placed in the  left lateral  position upon the table. The right flank was prepped and draped in a  sterile fashion. 1% lidocaine used as a local anesthetic. Under CT  fluoroscopic guidance an 18-gauge 15 cm Chiba needle was advanced into  the perinephric fluid collection of approximately 10 ml of a reddish  fluid was removed with some debris within the fluid. There is a striated  delayed nephrogram seen of the right kidney suggesting possible  pyelonephritis with surrounding stranding of the perinephric fat.  Myelolipoma seen on the adrenal gland on the left. Chiba needle was  removed and no immediate complication occurred. Slight decrease seen in  size of the perinephric fluid collection status post drainage.       Impression:       CT-guided drainage of a perinephric fluid collection with no  immediate complication.     D:  10/09/2019  E:  10/09/2019     This report was finalized on 10/9/2019 5:12 PM by Dr. Nella Enriquez MD.       CT Abdomen Pelvis With Contrast [[id number]] Collected:  10/08/19 2202     Updated:  10/08/19 2222    Narrative:       CT ABDOMEN AND PELVIS WITH CONTRAST, 10/8/2019    HISTORY:  49-year-old male in the ED complaining of 2 day history right side abdomen pain, nausea, vomiting and generalized weakness. History of hepatic cirrhosis (DUNLAP). History of diabetes with left BKA and multiple abdominal wall abscesses and lower extremity  cellulitis in 2018.    TECHNIQUE:  CT imaging of the abdomen and pelvis with IV contrast. Radiation dose reduction techniques included automated exposure control. Radiation audit for CT and nuclear cardiology exams in the last 12 months: 7.    COMPARISON:  *  CT abdomen/pelvis, 8/18/2019.    ABDOMEN FINDINGS:  Images show a right lower perinephric fluid collection that is either subcapsular or pericapsular. This extends along the lower kidney for a length of about 8.5 cm and has a maximum thickness of 2.5 cm. The fluid is mixed attenuation and may be  hemorrhagic.  Correlate for any recent history of blunt trauma to the flank. Renal parenchyma shows no disruption or abnormal enhancement. There is no fracture of overlying right lower posterior ribs or transverse spinous processes. Given the history,  perinephric abscess is possible but is significantly less likely given normal renal parenchymal enhancement. No evidence of urinary obstruction. No visible nephrolithiasis.    Advanced hepatic cirrhosis. Mild splenomegaly. No suspicious liver lesion. No upper abdominal ascites. Hepatic vasculature appears patent. No gallbladder distention or bile duct dilatation. Negative pancreas.    Benign left adrenal myelolipoma measuring about 2.9 cm. Normal caliber abdominal aorta. Small bowel and colon are normal in caliber and appearance, as imaged. The appendix is normal. No gastric distention or distal esophageal dilatation.    PELVIS FINDINGS:  Air within the urinary bladder, likely iatrogenic. There is at least mild diffuse bladder wall thickening. Prostate and rectum are unremarkable. No inguinal hernia.    Lung base images show multiple benign calcified granulomas.      Impression:       1.  Small mixed attenuation right perinephric fluid collection along the inferior capsule, not present on the prior exam. Perinephric hematoma is a consideration. Correlate clinically for any history of blunt flank trauma. While abscess is possible, this  is unlikely as right renal parenchyma enhances normally with no evidence of pyelonephritis. Laboratory correlation recommended.  2.  Advanced hepatic cirrhosis. Splenomegaly.  3.  Urinary bladder wall thickening. Gas within the bladder is most likely iatrogenic.  4.  Benign left adrenal myelolipoma.    Signer Name: Fadi Healy MD   Signed: 10/8/2019 10:02 PM   Workstation Name: [id number]    [facility]    XR Chest PA & Lateral [[id number]] Collected:  10/08/19 2210     Updated:  10/08/19 2212    Narrative:        CHEST X-RAY, 10/8/2019      HISTORY:    49-year-old male in the ED with reported diabetic ketoacidosis.      TECHNIQUE:  AP portable upright chest series.      FINDINGS:  Heart size and pulmonary vascularity are normal. The lungs are expanded and clear. No visible pulmonary infiltrate or pleural effusion. Lower lung benign calcified granulomas.      Impression:       Negative chest.    Signer Name: Fadi Healy MD   Signed: 10/8/2019 10:10 PM   Workstation Name: [email or web address]    [facility]            Impression:      --Acute Kleb septicemia/sepsis, likely urinary source/pyelnoephritis associated with urinary retention and  with abnormal CT scan with perinephric collection/abscess and Kleb on aspirate;  IV zosyn ongoing; urology to determine any timing/option or threshold for further intervention or functional/anatomic assessment.    --Acute perinephric abscess as above;  Likely to need repeat imaging in upcoming weeksto assess for progress -v- other sequelae; d/w medicine ;  Urology following to help guide timing/option/threshold for further drainage (perc -v- other) and currently they prefer observe with current therapy given little fluid able to be aspirated per them initially and improving clinically at present with abx therapy    --abnormal imaging with dry cough and probable pneumonia;  At risk for aspiration with recent vomiting/dry heaves and empiric zosyn started 10/19;  If productive cough, then send for culture; septic pulm emboli with kleb less likely    --acute loose stools/intermittent diarrhea improved per him; with Hx CDiff per him/family;  Empiric oral vancomycin added with high risk for relapse     --Hx perirectal abscess ; Colorectal surgery, Dr. Payne previously saw and is following.  Drainage as above on August 2 with mxed Fecal sherly in culture; CT scans  with no mention of abscess on 8/18 study;  Dr Payne  following given urology conern for fistula and to help  determine any timing/option or threshold for further GI/colorectal work-up     --History urinary retention.  urology following     --Hx ARF in August 2019;  ?obstruction initially associated with retention postop (1200 out with I/O cath postop per nursing) -v- contrast from CT -v- lisinopril/medication/vancomycin -v- relative hypotension -v- likely combination of factors with ATN;  vancomycin trough high and vancomycin stopped empirically 8/3 although high trough potentially accumulation as a consequence of diminishing renal function associated with retention/contrast/pressure rather than cause.  Nonetheless, avoid for now; likely further acute kidney injury at admission August 17 associated with dehydration/prerenal/ATN etiology     --History MRSA;  Not in current culture     --Diabetes mellitus type 2, uncontrolled.  He reports the reason for this is forgetfulness     --History [place] and chronic liver disease/cirrhosis  per past notes     --Left BKA     --Peripheral vascular disease     --medical noncompliance    PLAN:     --IV zosyn/oral vancomycin      --Check/review labs cultures and scans     --Discussed with microbiology     --History per nursing staff      --Discussed with family, partial history per them     --Highly complex set of issues with high risk for further serious morbidity and other serious sequela     --Colorectal surgery and urology have followed    --D/w Dr Vora    **intermittent nausea/dry heaves 10/18;  abd exam benign;  S/p repeat CT with persistent collection that may require repeat aspiration/drainage although urology prefers hold off and continue current abx for now;  Empiric broadening of abx 10/19 as above with aspiration risk and probable pneumonia;  Generally doing better 10/19-10/22 with these adjustments.    **?options for rehab -v- home;  Family interested in knowing options      Usman Dong MD  10/22/2019

## 2019-10-23 VITALS
DIASTOLIC BLOOD PRESSURE: 97 MMHG | BODY MASS INDEX: 30.59 KG/M2 | OXYGEN SATURATION: 94 % | SYSTOLIC BLOOD PRESSURE: 175 MMHG | HEART RATE: 76 BPM | WEIGHT: 246 LBS | TEMPERATURE: 97.8 F | HEIGHT: 75 IN | RESPIRATION RATE: 18 BRPM

## 2019-10-23 LAB
BACTERIA FLD CULT: ABNORMAL
BACTERIA SPEC AEROBE CULT: ABNORMAL
BACTERIA SPEC AEROBE CULT: ABNORMAL
GLUCOSE BLDC GLUCOMTR-MCNC: 116 MG/DL (ref 70–130)
GLUCOSE BLDC GLUCOMTR-MCNC: 123 MG/DL (ref 70–130)
GLUCOSE BLDC GLUCOMTR-MCNC: 134 MG/DL (ref 70–130)
GRAM STN SPEC: ABNORMAL
GRAM STN SPEC: ABNORMAL

## 2019-10-23 PROCEDURE — 82962 GLUCOSE BLOOD TEST: CPT

## 2019-10-23 PROCEDURE — 99239 HOSP IP/OBS DSCHRG MGMT >30: CPT | Performed by: NURSE PRACTITIONER

## 2019-10-23 PROCEDURE — 25010000002 ERTAPENEM PER 500 MG: Performed by: INTERNAL MEDICINE

## 2019-10-23 PROCEDURE — 63710000001 INSULIN DETEMIR PER 5 UNITS: Performed by: PHYSICIAN ASSISTANT

## 2019-10-23 PROCEDURE — 25010000002 PIPERACILLIN SOD-TAZOBACTAM PER 1 G: Performed by: INTERNAL MEDICINE

## 2019-10-23 RX ORDER — INSULIN GLARGINE 100 [IU]/ML
20 INJECTION, SOLUTION SUBCUTANEOUS 2 TIMES DAILY
Qty: 60 ML | Refills: 1 | Status: SHIPPED | OUTPATIENT
Start: 2019-10-23 | End: 2019-11-15 | Stop reason: HOSPADM

## 2019-10-23 RX ORDER — SACCHAROMYCES BOULARDII 250 MG
250 CAPSULE ORAL 2 TIMES DAILY
Qty: 60 CAPSULE | Refills: 0 | Status: SHIPPED | OUTPATIENT
Start: 2019-10-23 | End: 2019-11-15 | Stop reason: HOSPADM

## 2019-10-23 RX ADMIN — NYSTATIN: 100000 POWDER TOPICAL at 08:28

## 2019-10-23 RX ADMIN — INSULIN LISPRO 4 UNITS: 100 INJECTION, SOLUTION INTRAVENOUS; SUBCUTANEOUS at 08:31

## 2019-10-23 RX ADMIN — RENAGEL 800 MG: 800 TABLET ORAL at 12:01

## 2019-10-23 RX ADMIN — Medication 250 MG: at 08:30

## 2019-10-23 RX ADMIN — VANCOMYCIN HYDROCHLORIDE 125 MG: KIT at 12:02

## 2019-10-23 RX ADMIN — METOPROLOL TARTRATE 100 MG: 100 TABLET ORAL at 08:29

## 2019-10-23 RX ADMIN — TAZOBACTAM SODIUM AND PIPERACILLIN SODIUM 3.38 G: 375; 3 INJECTION, SOLUTION INTRAVENOUS at 09:00

## 2019-10-23 RX ADMIN — DULOXETINE HYDROCHLORIDE 30 MG: 30 CAPSULE, DELAYED RELEASE ORAL at 08:29

## 2019-10-23 RX ADMIN — TAZOBACTAM SODIUM AND PIPERACILLIN SODIUM 3.38 G: 375; 3 INJECTION, SOLUTION INTRAVENOUS at 00:07

## 2019-10-23 RX ADMIN — INSULIN LISPRO 4 UNITS: 100 INJECTION, SOLUTION INTRAVENOUS; SUBCUTANEOUS at 17:03

## 2019-10-23 RX ADMIN — VANCOMYCIN HYDROCHLORIDE 125 MG: KIT at 17:03

## 2019-10-23 RX ADMIN — VANCOMYCIN HYDROCHLORIDE 125 MG: KIT at 05:27

## 2019-10-23 RX ADMIN — INSULIN DETEMIR 20 UNITS: 100 INJECTION, SOLUTION SUBCUTANEOUS at 08:31

## 2019-10-23 RX ADMIN — INSULIN LISPRO 4 UNITS: 100 INJECTION, SOLUTION INTRAVENOUS; SUBCUTANEOUS at 12:02

## 2019-10-23 RX ADMIN — VANCOMYCIN HYDROCHLORIDE 125 MG: KIT at 00:06

## 2019-10-23 RX ADMIN — ERTAPENEM SODIUM 1 G: 1 INJECTION, POWDER, LYOPHILIZED, FOR SOLUTION INTRAMUSCULAR; INTRAVENOUS at 14:11

## 2019-10-23 RX ADMIN — AMLODIPINE BESYLATE 10 MG: 10 TABLET ORAL at 08:28

## 2019-10-23 RX ADMIN — RENAGEL 800 MG: 800 TABLET ORAL at 17:03

## 2019-10-23 RX ADMIN — RENAGEL 800 MG: 800 TABLET ORAL at 08:28

## 2019-10-23 NOTE — PROGRESS NOTES
[facility] Progress Note        Antibiotics:  Anti-Infectives (From admission, onward)    Ordered     Dose/Rate Route Frequency Start Stop    10/23/19 1206  ertapenem (INVanz) 1 g/100 mL 0.9% NS VTB (mbp)     Ordering Provider:  Usman Dong MD    1 g  over 30 Minutes Intravenous Every 24 Hours 10/23/19 1300 19 1259    10/19/19 0957  piperacillin-tazobactam (ZOSYN) 3.375 g in iso-osmotic dextrose 50 ml (premix)     Ordering Provider:  Usman Dong MD    3.375 g  over 30 Minutes Intravenous Once 10/19/19 1045 10/19/19 1257    10/15/19 1127  vancomycin oral solution reconstituted 125 mg     Jeyson Neely [facility] reviewed the order on 10/17/19 1241.   Ordering Provider:  Usman Dong MD    125 mg Oral Every 6 Hours Scheduled 10/15/19 1215 10/29/19 1159    10/09/19 1136  meropenem (MERREM) 1 g/100 mL 0.9% NS VTB (mbp)     Ordering Provider:  Usman Dong MD    1 g  over 30 Minutes Intravenous Once 10/09/19 1230 10/09/19 1251    10/08/19 2338  piperacillin-tazobactam (ZOSYN) 3.375 g in iso-osmotic dextrose 50 ml (premix)     Ordering Provider:  Yasmany Martinez PA-C    3.375 g  over 30 Minutes Intravenous Once 10/08/19 2340 10/09/19 0016          CC: fatigue    HPI:  Patient is a 49 y.o.  Yr old male with history of poorly contolled T2DM, DUNLAP/liver cirrhosis, HTN, PVD/Left BKA, and multiple skin abscesses/MRSA who presented to [facility] ED with right shin wound infection summer 2018.  He was unable to get to see Dr. Martinez as his father passed away unexpectedly on 18 and he had to wait until after the .  The wound worsened becoming gangrenous and he came to [facility] ED in 2018.  He also had been hospitalized at [facility] and then transferred to  for a severe penis/scrotum infection requiring surgical debridement in summer 2018.  He received antibiotics regarding his right leg infection, generally improved over the remainder of summer 2018 but that area had not  completely healed. He was admitted April 24, 2019 with acute left lower abdominal wall redness/swelling and pain, spontaneous drainage associated with fever/chills and uncontrolled blood sugars.  4/26 I&D requiring wide debridement , severe/deep infection and transferred to [facility] on May 3 with IV Zosyn/oral doxycycline. Cultures had lactobacillus and mixed gram-positive skin sherly including alpha strep, staph epidermidis and corynebacterium. Readmitted to [facility] May 18, 2019, increasing generalized edema ultimately transitioned to oral doxycycline and healed.     He presented to the emergency room July 30, 2019 with acute rectal pain that had begun 7/27; this is associated with constipation for days, chills and nausea/dry heaving.  White blood cell count elevated, high hemoglobin A1c over 13, urinalysis with hematuria/pyuria and CT scan with 3 x 3 cm fluid collection anterior to the distal rectum and per discussion with Dr. Akbar/Jennifer, this is not in the prostate.  Colorectal surgery/urology saw him for consideration of surgical options/timing/threshold, etc..     8/2/19 I&Dper Dr Payne perirectal abscess; patient reports that he had instrumented his rectum prior to hospitalization with enema     8/3/19 urinary retention overnight requiring in/out catheterization per patient.  Creat climb     8/4/19 further creat climb; childs placed and lisinopril stopped and d/w Dr Rubio;  ?obstruction initially associated with retention postop (1200 out with I/O cath postop per nursing) -v- contrast from CT -v- lisinopril/medication/vancomycin -v- relative hypotension -v- likely combination of factors with ATN; nephrology following; vancomycin trough high and vancomycin stopped empirically 8/3 although high trough potentially accumulation as a consequence of diminishing renal function associated with retention/contrast/pressure rather than cause.  Nonetheless, avoid for now; creatinine trending  "down.        8/7 in retrospect he remembers air in his urine at admit which has raised concern for possible fistula from urinary tract;  No fecaluria and culture from abscess is fecal sherly;  ?rectal-urethral fistula;  Dr Payne aware     Culture with E. coli/Klebsiella species and creatinine generally trended down, transitioned to oral antibiotics at discharge.     Readmitted August 17, 2019 with nausea/vomiting/dry heaves for 3 days.  Walker catheter was placed and CT scan of the abdomen showed:      \"Previously seen fluid collection identified inferior to the prostate gland is more increased in density on today's examination. There is wall thickening again seen throughout the bladder. Findings again are concerning for cystitis.\" per radiology     He was transitioned to oral omnicef/topical antifungal on approx 8/23/19; Noncompliant with f/u in our office     READMITTED 10/8/19 RLQ abd pain, urinary retention, nausea, dysuria and generalized fatigue     UTI by U/A, subsequent blood cultures positive for GNR and initial PCR with kleb sp, no carbapenem resistance by initial PCR per my d/w micro;  Abnormal CT abd with right perinephric fluid collection, advanced cirrhosis, urinary bladder thickening.  10/9 Aspirate of perinephric fluid collection consistent with abscess/gram-negative lon and white blood cells     10/16/19 cytoscopy with bullous change per Dr Gutierres but no fistula per him    10/19/19 intermittent nausea, no vomiting or dry heaves this am, intermittent dry cough broadened to zosyn with ?aspiration pneumonia evolving after dry heaves/vomiting    10/23/19 feeling better per him and he wants home; cough less and dry;  No hemoptysis; denies new pain;   no diarrhea; He currently denies abd pain; no overt hematuria or pyuria;  He denied any  fecaluria prior to admit to me.       No headache photophobia or neck stiffness.  No shortness of breath or hemoptysis.   No hematochezia melena or hematemesis.  No skin " "rash.     ROS:      10/23/19 No f/c/s. No n/v/d. No rash. No new ADR to Abx.     Constitutional-- No Fever, chills or sweats.  Appetite diminished with fatigue  Heent-- No new vision, hearing or throat complaints.  No epistaxis or oral sores.  Denies odynophagia or dysphagia.  No flashers, floaters or eye pain. No odynophagia or dysphagia. No headache, photophobia or neck stiffness.  CV-- No chest pain, palpitation or syncope  Resp-- as above  GI-  No hematochezia, melena, or hematemesis. Denies jaundice.  -- No dysuria, hematuria, or flank pain.  Denies hesitancy, urgency or flank pain.  Lymph- no swollen lymph nodes in neck/axilla or groin.   Heme- No active bruising or bleeding; no Hx of DVT or PE.  MS-- no swelling or pain in the bones or joints of arms/legs.  No new back pain.  Neuro-- No acute focal weakness or numbness in the arms or legs.  No seizures.     Full 12 point review of systems reviewed and negative otherwise for acute complaints, except for above      PE:   /97 (BP Location: Left arm, Patient Position: Lying)   Pulse 76   Temp 97.8 °F (36.6 °C) (Oral)   Resp 18   Ht 190.5 cm (75\")   Wt 112 kg (246 lb)   SpO2 94%   BMI 30.75 kg/m²     GENERAL: awake, in no acute distress.   HEENT: Normocephalic, atraumatic.  PERRL. EOMI. No conjunctival injection. No icterus. Oropharynx clear without evidence of thrush or exudate. No evidence of peridontal disease.    NECK: Supple without nuchal rigidity. No mass.  LYMPH: No cervical, axillary or inguinal lymphadenopathy.  HEART: RRR; No murmur, rubs, gallops.   LUNGS: Diminished at bases bilaterally with scattered rhonchi. Normal respiratory effort. Nonlabored. No dullness.  ABDOMEN: Soft, nontender, nondistended. Positive bowel sounds. No rebound or guarding. NO mass or HSM.  EXT:  No cyanosis, clubbing or edema. No cord.  : no new findings; +childs  MSK: FROM without joint effusions noted arms/legs.    SKIN: Warm and dry without cutaneous " eruptions on Inspection/palpation.    NEURO: awake     Amputee status noted with left BKA     No CVA tenderness     No peripheral stigmata/phenomena of endocarditis    Laboratory Data    Results from last 7 days   Lab Units 10/22/19  0514 10/21/19  0634 10/20/19  0536   WBC 10*3/mm3 15.49* 15.94* 16.90*   HEMOGLOBIN g/dL 9.1* 9.1* 8.4*   HEMATOCRIT % 29.3* 28.9* 27.7*   PLATELETS 10*3/mm3 369 375 338     Results from last 7 days   Lab Units 10/22/19  0514   SODIUM mmol/L 141   POTASSIUM mmol/L 3.8   CHLORIDE mmol/L 103   CO2 mmol/L 29.0   BUN mg/dL 11   CREATININE mg/dL 0.47*   GLUCOSE mg/dL 129*   CALCIUM mg/dL 8.2*     Results from last 7 days   Lab Units 10/21/19  0634   ALK PHOS U/L 246*   BILIRUBIN mg/dL <0.2*   ALT (SGPT) U/L 15   AST (SGOT) U/L 19               Estimated Creatinine Clearance: 251.2 mL/min (A) (by C-G formula based on SCr of 0.47 mg/dL (L)).      Microbiology:      Radiology:  Imaging Results (last 72 hours)     Procedure Component Value Units Date/Time    CT Guided Abscess Drain Retroperitoneal [[id number]] Collected:  10/09/19 1611     Updated:  10/09/19 1715    Narrative:       EXAMINATION: CT GUIDED ABSCESS DRAIN RETROPERITONEAL- 10/09/2019     INDICATION: N15.1-Renal and perinephric abscess; A41.9-Sepsis,  unspecified organism; E86.0-Dehydration; E11.65-Type 2 diabetes mellitus  with hyperglycemia; I10-Essential (primary) hypertension;  Z86.14-Personal history of Methicillin resistant Staphylococcus aureus  infection; K74.60-Unspecified cirrhosis of liver; fluid collection     TECHNIQUE: CT-guided drainage of a perinephric fluid collection     The radiation dose reduction device was turned on for each scan per the  ALARA (As Low as Reasonably Achievable) protocol.     COMPARISON: NONE     FINDINGS: Patient referred for CT-guided drainage of a perinephric fluid  collection. Procedure and risks were explained to the patient. Informed  consent was obtained and signed. Patient placed in the  left lateral  position upon the table. The right flank was prepped and draped in a  sterile fashion. 1% lidocaine used as a local anesthetic. Under CT  fluoroscopic guidance an 18-gauge 15 cm Chiba needle was advanced into  the perinephric fluid collection of approximately 10 ml of a reddish  fluid was removed with some debris within the fluid. There is a striated  delayed nephrogram seen of the right kidney suggesting possible  pyelonephritis with surrounding stranding of the perinephric fat.  Myelolipoma seen on the adrenal gland on the left. Chiba needle was  removed and no immediate complication occurred. Slight decrease seen in  size of the perinephric fluid collection status post drainage.       Impression:       CT-guided drainage of a perinephric fluid collection with no  immediate complication.     D:  10/09/2019  E:  10/09/2019     This report was finalized on 10/9/2019 5:12 PM by Dr. Nella Enriquez MD.       CT Abdomen Pelvis With Contrast [[id number]] Collected:  10/08/19 2202     Updated:  10/08/19 2222    Narrative:       CT ABDOMEN AND PELVIS WITH CONTRAST, 10/8/2019    HISTORY:  49-year-old male in the ED complaining of 2 day history right side abdomen pain, nausea, vomiting and generalized weakness. History of hepatic cirrhosis (DUNLAP). History of diabetes with left BKA and multiple abdominal wall abscesses and lower extremity  cellulitis in 2018.    TECHNIQUE:  CT imaging of the abdomen and pelvis with IV contrast. Radiation dose reduction techniques included automated exposure control. Radiation audit for CT and nuclear cardiology exams in the last 12 months: 7.    COMPARISON:  *  CT abdomen/pelvis, 8/18/2019.    ABDOMEN FINDINGS:  Images show a right lower perinephric fluid collection that is either subcapsular or pericapsular. This extends along the lower kidney for a length of about 8.5 cm and has a maximum thickness of 2.5 cm. The fluid is mixed attenuation and may be  hemorrhagic.  Correlate for any recent history of blunt trauma to the flank. Renal parenchyma shows no disruption or abnormal enhancement. There is no fracture of overlying right lower posterior ribs or transverse spinous processes. Given the history,  perinephric abscess is possible but is significantly less likely given normal renal parenchymal enhancement. No evidence of urinary obstruction. No visible nephrolithiasis.    Advanced hepatic cirrhosis. Mild splenomegaly. No suspicious liver lesion. No upper abdominal ascites. Hepatic vasculature appears patent. No gallbladder distention or bile duct dilatation. Negative pancreas.    Benign left adrenal myelolipoma measuring about 2.9 cm. Normal caliber abdominal aorta. Small bowel and colon are normal in caliber and appearance, as imaged. The appendix is normal. No gastric distention or distal esophageal dilatation.    PELVIS FINDINGS:  Air within the urinary bladder, likely iatrogenic. There is at least mild diffuse bladder wall thickening. Prostate and rectum are unremarkable. No inguinal hernia.    Lung base images show multiple benign calcified granulomas.      Impression:       1.  Small mixed attenuation right perinephric fluid collection along the inferior capsule, not present on the prior exam. Perinephric hematoma is a consideration. Correlate clinically for any history of blunt flank trauma. While abscess is possible, this  is unlikely as right renal parenchyma enhances normally with no evidence of pyelonephritis. Laboratory correlation recommended.  2.  Advanced hepatic cirrhosis. Splenomegaly.  3.  Urinary bladder wall thickening. Gas within the bladder is most likely iatrogenic.  4.  Benign left adrenal myelolipoma.    Signer Name: Fadi Healy MD   Signed: 10/8/2019 10:02 PM   Workstation Name: [id number]    [facility]    XR Chest PA & Lateral [[id number]] Collected:  10/08/19 2210     Updated:  10/08/19 2212    Narrative:        CHEST X-RAY, 10/8/2019      HISTORY:    49-year-old male in the ED with reported diabetic ketoacidosis.      TECHNIQUE:  AP portable upright chest series.      FINDINGS:  Heart size and pulmonary vascularity are normal. The lungs are expanded and clear. No visible pulmonary infiltrate or pleural effusion. Lower lung benign calcified granulomas.      Impression:       Negative chest.    Signer Name: Fadi Healy MD   Signed: 10/8/2019 10:10 PM   Workstation Name: [email or web address]    [facility]            Impression:      --Acute Kleb septicemia/sepsis, likely urinary source/pyelnoephritis associated with urinary retention and  with abnormal CT scan with perinephric collection/abscess and Kleb on aspirate;  IV Invanz ongoing; urology to determine any timing/option or threshold for further intervention or functional/anatomic assessment.    --Acute perinephric abscess as above;  Likely to need repeat imaging in upcoming weeksto assess for progress -v- other sequelae; d/w medicine ;  Urology following to help guide timing/option/threshold for further drainage (perc -v- other) and currently they prefer observe with current therapy given little fluid able to be aspirated per them initially and improving clinically at present with abx therapy    --abnormal imaging with dry cough and probable pneumonia;  At risk for aspiration with recent vomiting/dry heaves and empiric zosyn started 10/19; changed to Invanz 10/23 to help facilitate home therapy with mom;  If productive cough, then send for culture; septic pulm emboli with kleb less likely    --acute loose stools/intermittent diarrhea improved per him; with Hx CDiff per him/family;  Empiric oral vancomycin added with high risk for relapse     --Hx perirectal abscess ; Colorectal surgery, Dr. Payne previously saw and is following.  Drainage as above on August 2 with mxed Fecal sherly in culture; CT scans  with no mention of abscess on 8/18 study;    [facility]mer  following given urology conern for fistula and to help determine any timing/option or threshold for further GI/colorectal work-up     --History urinary retention.  urology following     --Hx ARF in August 2019;  ?obstruction initially associated with retention postop (1200 out with I/O cath postop per nursing) -v- contrast from CT -v- lisinopril/medication/vancomycin -v- relative hypotension -v- likely combination of factors with ATN;  vancomycin trough high and vancomycin stopped empirically 8/3 although high trough potentially accumulation as a consequence of diminishing renal function associated with retention/contrast/pressure rather than cause.  Nonetheless, avoid for now; likely further acute kidney injury at admission August 17 associated with dehydration/prerenal/ATN etiology     --History MRSA;  Not in current culture     --Diabetes mellitus type 2, uncontrolled.  He reports the reason for this is forgetfulness     --History Johnston and chronic liver disease/cirrhosis  per past notes     --Left BKA     --Peripheral vascular disease     --medical noncompliance    PLAN:     --IV Invanz/oral vancomycin      --Check/review labs cultures and scans     --Discussed with microbiology     --History per nursing staff      --Discussed with family, partial history per them     --Highly complex set of issues with high risk for further serious morbidity and other serious sequela     --Colorectal surgery and urology have followed    --D/w Dr Vora    **intermittent nausea/dry heaves 10/18;  abd exam benign;  S/p repeat CT with persistent collection that may require repeat aspiration/drainage although urology prefers hold off and continue current abx for now;  Empiric broadening of abx 10/19 as above with aspiration risk and probable pneumonia;  Generally doing better 10/19-10/22 with these adjustments.    **patient and family insisting on home IV abx;  They know expectations for compliance;  Invanz 1 gm IV daily,  continue oral vancomycin and f/u with me on Friday in clinic 10/25      Usman Dong MD  10/23/2019

## 2019-10-23 NOTE — PROGRESS NOTES
Case Management Discharge Note    Final Note: Spoke with patient and his wife.  Arranged [facility] to deliver IV Invanz and oral Vancomycin later this afternoon and teach wife how to infuse Invanz.  Spoke with nursing staff who will provide a primary provider list upon discharge.  Wife to transport.     Destination      No service has been selected for the patient.      Durable Medical Equipment      No service has been selected for the patient.      Dialysis/Infusion - Selection Complete      Service Provider Request Status Selected Services Address Phone Number Fax Number    [facility] [address] [facility] [facility] [address] [phone]      Home Medical Care - Selection Complete      Service Provider Request Status Selected Services Address Phone Number Fax Number    [facility] Selected Home Health Services [address] [address] [phone]      Therapy      No service has been selected for the patient.      Community Resources      No service has been selected for the patient.             Final Discharge Disposition Code: 06 - home with home health care

## 2019-10-23 NOTE — DISCHARGE PLACEMENT REQUEST
"To [facility] hospital Fax [phone]  From Barb Batista RN  [phone]    Elliot Crystal (49 y.o. Male)     Date of Birth Social Security Number Address Home Phone MRN    1970  [facility] [address] [place] [address] [phone] [id number]    [employer] Marital Status          [employer]        Admission Date Admission Type Admitting Provider Attending Provider Department, Room/Bed    10/8/19 Emergency Andrei Vora MD Furlow, Stephen Matthew, MD [facility] 5G, S552/1    Discharge Date Discharge Disposition Discharge Destination                       Attending Provider:  Andrei Vora MD    Allergies:  No Known Allergies    Isolation:  None   Infection:  MRSA (03/03/17)   Code Status:  CPR    Ht:  190.5 cm (75\")   Wt:  112 kg (246 lb)    Admission Cmt:  None   Principal Problem:  Perinephric abscess [N15.1]                 Active Insurance as of 10/8/2019     Primary Coverage     Payor Plan Insurance Group Employer/Plan Group    MEDICARE MEDICARE A & B      Payor Plan Address Payor Plan Phone Number Payor Plan Fax Number Effective Dates    [address] [phone]  6/1/2017 - None Entered    [facility] [id number]       Subscriber Name Subscriber Birth Date Member ID       ELLIOT CRYSTAL 1970 [id number]           Secondary Coverage     Payor Plan Insurance Group Employer/Plan Group    [place]      Payor Plan Address Payor Plan Phone Number Payor Plan Fax Number Effective Dates    [address] [phone]  10/3/2017 - None Entered    [place] [address]       Subscriber Name Subscriber Birth Date Member ID       ELLIOT CRYSTAL 1970 [id number]                 Emergency Contacts      (Rel.) Home Phone Work Phone Mobile Phone    Cindy Crystal (Spouse) [phone] -- [phone]        [facility] 5G  [address] [facility] [address]  Phone:  [phone]  Fax:   Date: Oct 23, 2019 "      Ambulatory Referral to Home Health     Patient:  Kendrick Crystal MRN:  [id number]   [address] [place] [address] :  1970  SSN:    Phone: [phone] Sex:  M      INSURANCE PAYOR PLAN GROUP # SUBSCRIBER ID   Primary:  Secondary:    [place] [id number]  [id number]      [id number]  [id number]      Referring Provider Information:  FLORINDA WELLINGTON Phone: [phone] Fax:       Referral Information:   # Visits:  1 Referral Type: Home Health [42]   Urgency:  Routine Referral Reason: Specialty Services Required   Start Date: Oct 23, 2019 End Date:  To be determined by Insurer   Diagnosis: Perinephric abscess (N15.1 [ICD-10-CM] 590.2 [ICD-9-CM])  Uncontrolled type 2 diabetes mellitus with hyperglycemia (CMS/HCC) (E11.65 [ICD-10-CM] 250.02 [ICD-9-CM])  History of MRSA infection (Z86.14 [ICD-10-CM] V12.04 [ICD-9-CM])  Cirrhosis of liver without ascites, unspecified hepatic cirrhosis type (CMS/HCC) (K74.60 [ICD-10-CM] 571.5 [ICD-9-CM])  Impaired mobility and ADLs (Z74.09 [ICD-10-CM] 799.89 [ICD-9-CM])  Non compliance w medication regimen (Z91.14 [ICD-10-CM] V15.81 [ICD-9-CM])  S/P BKA (below knee amputation), left (CMS/HCC) (Z89.512 [ICD-10-CM] V49.75 [ICD-9-CM])  Bacteremia due to Klebsiella pneumoniae (R78.81 [ICD-10-CM] 790.7,041.3 [ICD-9-CM])  Obesity (BMI 30-39.9) (E66.9 [ICD-10-CM] 278.00 [ICD-9-CM])      Refer to Dept:   Refer to Provider:   Refer to Facility:    CBC/CMP every Monday  PICC line dressing care every Monday  Face to Face Visit Date: 10/23/2019  Follow-up provider for Plan of Care? I treated the patient in an acute care facility and will not continue treatment after discharge.  Follow-up provider: YODIT CHAMORRO [[id number]]  Reason/Clinical Findings: right ankle wound, perinephric abscess, perirectal abscess,  Describe mobility limitations that make leaving home difficult: left below the knee amputation, debility, weakness  Nursing/Therapeutic Services  Requested: Skilled Nursing  Nursing/Therapeutic Services Requested: Physical Therapy  Nursing/Therapeutic Services Requested: Occupational Therapy  Skilled nursing orders: PICC line care/instruction  Skilled nursing orders: Wound care dressing/changes  Skilled nursing orders: Medication education  Instructions: Monitor right lateral ankle wound, Hydrofera blue every other day to right lateral ankle  PT orders: Transfer training  PT orders: Strengthening  PT orders: Therapeutic exercise  Occupational orders: Activities of daily living  Occupational orders: Strengthening  Occupational orders: Home safety assessment  Frequency: Other     This document serves as a request of services and does not constitute Insurance authorization or approval of services.  To determine eligibility, please contact the members Insurance carrier to verify and review coverage.     If you have medical questions regarding this request for services. Please contact [address] at [phone] between the hours of 8:00am - 5:00pm (Mon-Fri).       Verbal Order Mode: Verbal with readback   Authorizing Provider: Edwige Melgar APRN  Authorizing Provider's NPI: [id number]     Order Entered By: Barb Batista RN 10/23/2019  1:59 PM                 [address] [address]  Phone:  [phone]  Fax:          Patient:     Kendrick Crystal MRN:  [id number]   [address] [place] [address] :  1970  SSN:    Phone: [phone] Sex:  M      INSURANCE PAYOR PLAN GROUP # SUBSCRIBER ID   Primary:  Secondary:    [place] [id number]  [id number]      [id number]  [id number]   Admitting Diagnosis: Perinephric abscess [N15.1]  Order Date:  Oct 23, 2019         Case Management  Consult       (Order ID: [id number])     Diagnosis:         Priority:  Routine Expected Date:   Expiration Date:        Interval:   Count:    Comments:   "to arrange Invanz 1 gm IV daily with home health and qmonday cbc/cmp and qmonday picc dressing care;  F/u with me on Friday 10/25 in clinic at 09:15  Reason for Consult? discharge planning     Specimen Type:   Specimen Source:   Specimen Taken Date:   Specimen Taken Time:                   Authorizing Provider:Usman Dong MD  Authorizing Provider's NPI: [id number]  Order Entered By: Usman Dong MD 10/23/2019 12:12 PM     Electronically signed by: Usman Dong MD 10/23/2019 12:12 PM               History & Physical      Margo Tafoya, DO at 10/08/19 2341              [facility]  HISTORY AND PHYSICAL    Patient Name: Kendrick Crystla  : 1970  MRN: [id number]  Primary Care Physician: Keyanna Leone APRN  Date of admission: 10/8/2019      Subjective   Subjective     Chief Complaint:  Abdominal pain    HPI:  Kendrick Crystal is a 49 y.o. male with a past medical history significant for uncontrolled DM2, gastroparesis, DUNLAP cirrhosis, HTN, HLD, and left BKA who presents with complaints of right lower quadrant abdominal pain going on for the past 2 days. Pain is constant and worse with movement and PO intake. Rated 10/10 at its worst. There is radiation to the right flank. Patient also endorsing associated nausea without vomiting, diarrhea, dysuria, and generalized weakness. His blood sugar typically runs in the 300's but for the past 2-3 days it has been greater than 500. He takes his insulin \" only when he remembers\" and had a history of medical non compliance. Reports to ED for further evaluation. Currently denies associated fever, cough, congestion, SOB, chest pain, blood in stool or urine.    Records reviewed indicate patient was discharged from this service 19 after being admitted for sepsis secondary to perirectal abscess and fistula. He underwent operative drainage per Dr. Payne. Cultures isolated E. Coli and Klebsiella, susceptible to " zosyn. Was followed by ID, Dr. Dong. Patient was then readmitted three days later with urosepsis.     Emergency Department Evaluation; vitals stable. Troponin 0.042. Glucose 450. A1c 14.10. WBC 30,000. UA positive for acute infection. CXR clear. CT A/P demonstrates; right perinephritic abscess vs hematoma plus splenomegaly with advanced hepatic cirrhosis.     Review of Systems   Constitutional: Negative for chills and fever.   HENT: Negative for congestion and trouble swallowing.    Eyes: Negative for photophobia and visual disturbance.   Respiratory: Negative for cough and shortness of breath.    Cardiovascular: Negative for chest pain and leg swelling.   Gastrointestinal: Positive for abdominal pain, diarrhea and nausea. Negative for vomiting.   Endocrine: Negative for heat intolerance and polydipsia.   Genitourinary: Positive for dysuria and flank pain. Negative for decreased urine volume and hematuria.   Musculoskeletal: Negative for back pain and gait problem.   Skin: Negative for pallor and rash.   Allergic/Immunologic: Positive for immunocompromised state.   Neurological: Positive for weakness. Negative for dizziness and headaches.   Hematological: Negative for adenopathy.   Psychiatric/Behavioral: Negative for agitation and confusion.          All other systems reviewed and are negative.     Personal History     Past Medical History:   Diagnosis Date   • Abdominal wall cellulitis 5/20/2019   • JUAN (acute kidney injury) (CMS/HCC) 7/29/2018   • Arthritis    • Bipolar 1 disorder (CMS/HCC)    • Cellulitis of right anterior lower leg 7/29/2018   • Cirrhosis (CMS/HCC)    • Counseling for insulin pump    • Depression    • Diabetes mellitus (CMS/HCC)    • Encephalopathy, hepatic (CMS/HCC)    • H/O degenerative disc disease    • History of Lissa's gangrene     right leg/testicle   • Hyperlipemia    • Hypertension    • multiple Skin abscesses    • DUNLAP, bx showed stage IV fibrosis    • Neuropathy    • Peripheral  vascular disease (CMS/HCC)    • Thrombophlebitis        Past Surgical History:   Procedure Laterality Date   • ABDOMINAL WALL ABSCESS INCISION AND DRAINAGE N/A 4/26/2019    Procedure: ABDOMINAL WALL DEBRIDEMENT;  Surgeon: Fadi Kern MD;  Location:  [facility];  Service: General   • AMPUTATION DIGIT Left 1/30/2017    Procedure: left fourth and fifth transmetatarsal toe amputation ;  Surgeon: Juancho Martinez MD;  Location:  [facility];  Service:    • BELOW KNEE AMPUTATION Left 3/2/2017    Procedure: AMPUTATION BELOW KNEE, SHMUEL;  Surgeon: Juancho Martinez MD;  Location:  [facility];  Service:    • ENDOSCOPY     • HEMORRHOIDECTOMY N/A 8/2/2019    Procedure: EXCISION AND DRAIN PERIRECTAL ABSCESS;  Surgeon: Mumtaz Payne MD;  Location:  [facility];  Service: General   • LEG SURGERY     • TESTICLE SURGERY Right     DEBRIDEMENT FROM GANGRENE   • TONSILLECTOMY  1975       Family History: family history includes Arthritis in his father and mother; Diabetes in his brother and father; Heart attack in his father; Hyperlipidemia in his father; Hypertension in his brother, father, and mother; Kidney disease in his mother; Mental illness in his sister; Obesity in his brother; Stroke in his mother. Otherwise pertinent FHx was reviewed and unremarkable.     Social History:  reports that he has never smoked. He has never used smokeless tobacco. He reports that he does not drink alcohol or use drugs.  Social History     Social History Narrative    L[facility] in [facility]       Medications:    Available home medication information reviewed.    (Not in a hospital admission)    No Known Allergies    Objective   Objective     Vital Signs:   Temp:  [98.7 °F (37.1 °C)] 98.7 °F (37.1 °C)  Heart Rate:  [] 103  Resp:  [17-20] 17  BP: (111-153)/(72-85) 134/83        Physical Exam   Constitutional: Awake, alert  Eyes: PERRLA, sclerae anicteric, no conjunctival injection  HENT: NCAT, mucous membranes moist  Neck: Supple, no thyromegaly,  no lymphadenopathy, trachea midline  Respiratory: Clear to auscultation bilaterally, nonlabored respirations   Cardiovascular: RRR, no murmurs, rubs, or gallops, palpable pedal pulses bilaterally  Gastrointestinal: Positive bowel sounds, soft, TTP to right mid abdomen  Musculoskeletal: No bilateral ankle edema, no clubbing or cyanosis to extremities  Left BKA  Psychiatric: Appropriate affect, cooperative  Neurologic: Oriented x 3, strength symmetric in all extremities, Cranial Nerves grossly intact to confrontation, speech clear  Skin: No rashes    Results Reviewed:  I have personally reviewed current lab and radiology data.    Results from last 7 days   Lab Units 10/08/19  2008   WBC 10*3/mm3 30.92*   HEMOGLOBIN g/dL 11.4*   HEMATOCRIT % 34.6*   PLATELETS 10*3/mm3 257     Results from last 7 days   Lab Units 10/08/19  2008   SODIUM mmol/L 124*   POTASSIUM mmol/L 4.3   CHLORIDE mmol/L 84*   CO2 mmol/L 23.0   BUN mg/dL 33*   CREATININE mg/dL 0.85   GLUCOSE mg/dL 449*   CALCIUM mg/dL 9.2   ALT (SGPT) U/L 10   AST (SGOT) U/L 9   TROPONIN T ng/mL 0.042*   LACTATE mmol/L 1.8     Estimated Creatinine Clearance: 138.3 mL/min (by C-G formula based on SCr of 0.85 mg/dL).  Brief Urine Lab Results  (Last result in the past 365 days)      Color   Clarity   Blood   Leuk Est   Nitrite   Protein   CREAT   Urine HCG        10/08/19 2256 Yellow Cloudy Moderate (2+) Small (1+) Positive Negative             Imaging Results (last 24 hours)     Procedure Component Value Units Date/Time    CT Abdomen Pelvis With Contrast [[id number]] Collected:  10/08/19 2202     Updated:  10/08/19 2222    Narrative:       CT ABDOMEN AND PELVIS WITH CONTRAST, 10/8/2019    HISTORY:  49-year-old male in the ED complaining of 2 day history right side abdomen pain, nausea, vomiting and generalized weakness. History of hepatic cirrhosis (DUNLAP). History of diabetes with left BKA and multiple abdominal wall abscesses and lower extremity  cellulitis in  2018.    TECHNIQUE:  CT imaging of the abdomen and pelvis with IV contrast. Radiation dose reduction techniques included automated exposure control. Radiation audit for CT and nuclear cardiology exams in the last 12 months: 7.    COMPARISON:  *  CT abdomen/pelvis, 8/18/2019.    ABDOMEN FINDINGS:  Images show a right lower perinephric fluid collection that is either subcapsular or pericapsular. This extends along the lower kidney for a length of about 8.5 cm and has a maximum thickness of 2.5 cm. The fluid is mixed attenuation and may be  hemorrhagic. Correlate for any recent history of blunt trauma to the flank. Renal parenchyma shows no disruption or abnormal enhancement. There is no fracture of overlying right lower posterior ribs or transverse spinous processes. Given the history,  perinephric abscess is possible but is significantly less likely given normal renal parenchymal enhancement. No evidence of urinary obstruction. No visible nephrolithiasis.    Advanced hepatic cirrhosis. Mild splenomegaly. No suspicious liver lesion. No upper abdominal ascites. Hepatic vasculature appears patent. No gallbladder distention or bile duct dilatation. Negative pancreas.    Benign left adrenal myelolipoma measuring about 2.9 cm. Normal caliber abdominal aorta. Small bowel and colon are normal in caliber and appearance, as imaged. The appendix is normal. No gastric distention or distal esophageal dilatation.    PELVIS FINDINGS:  Air within the urinary bladder, likely iatrogenic. There is at least mild diffuse bladder wall thickening. Prostate and rectum are unremarkable. No inguinal hernia.    Lung base images show multiple benign calcified granulomas.      Impression:       1.  Small mixed attenuation right perinephric fluid collection along the inferior capsule, not present on the prior exam. Perinephric hematoma is a consideration. Correlate clinically for any history of blunt flank trauma. While abscess is possible,  this  is unlikely as right renal parenchyma enhances normally with no evidence of pyelonephritis. Laboratory correlation recommended.  2.  Advanced hepatic cirrhosis. Splenomegaly.  3.  Urinary bladder wall thickening. Gas within the bladder is most likely iatrogenic.  4.  Benign left adrenal myelolipoma.    Signer Name: Fadi Healy MD   Signed: 10/8/2019 10:02 PM   Workstation Name: [facility]    [facility]    XR Chest PA & Lateral [[id number]] Collected:  10/08/19 2210     Updated:  10/08/19 2212    Narrative:       CHEST X-RAY, 10/8/2019      HISTORY:    49-year-old male in the ED with reported diabetic ketoacidosis.      TECHNIQUE:  AP portable upright chest series.      FINDINGS:  Heart size and pulmonary vascularity are normal. The lungs are expanded and clear. No visible pulmonary infiltrate or pleural effusion. Lower lung benign calcified granulomas.      Impression:       Negative chest.    Signer Name: Fadi Healy MD   Signed: 10/8/2019 10:10 PM   Workstation Name: [facility]    [facility]        Results for orders placed during the hospital encounter of 05/18/19   Adult Transthoracic Echo Complete W/ Cont if Necessary Per Protocol    Narrative · Estimated EF = 60%.  · Mild mitral valve regurgitation is present  · Mild tricuspid valve regurgitation is present.  · Calculated right ventricular systolic pressure from tricuspid   regurgitation is 29 mmHg.          Assessment/Plan   Assessment / Plan     Active Hospital Problems    Diagnosis POA   • **Perinephric abscess [N15.1] Yes     Priority: High   • Acute UTI (urinary tract infection) [N39.0] Yes     Priority: High   • Gastroparesis [K31.84] Yes     Priority: Medium   • Type 2 diabetes mellitus with circulatory disorder and uncontrolled [E11.59] Yes     Priority: Medium   • DUNLAP Cirrhosis [K74.60] Yes     Priority: Medium   • Essential hypertension [I10] Yes     Priority: Medium   • S/P  BKA (below knee amputation), left (CMS/HCC) [Z89.512] Not Applicable     Priority: Low   • Hyperlipemia [E78.5] Yes     Priority: Low   • Non-compliance [Z91.14] Not Applicable     Priority: Low         Sepsis secondary to Right Perinephritic abscess and UTI  - blood and urine cultures pending, adjust abx accordingly  - Zosyn q 8 hours  - IVF, bolus per sepsis protocol in ED  - consider consult to Iker [facility]IA as he followed patient during previous admission  - urology, Dr. Marks to see in AM for possible operative drainage  - tylenol PRN for fever  - NPO after midnight    Uncontrolled DM2 with Hyperglycemia  - A1c 14.0, he says his blood sugar runs >400 at baseline  - medical non compliance. Currently hyperglycemic. Not in DKA as there is no acidosis present  - initially started on insulin gtt in the ED, will discontinue  - on lantus 30 units BID and humalog 5 units TID. Decrease to 20 units BID and add moderate SSI for now  - diabetic educator and nutrition consult    DUNLAP cirrhosis:  - stable. Avoid hepatotoxins    HTN:  - controlled and stable. Continue home meds     Hyponatremia:  - corrected sodium for glucose is 130, suspect hypovolemic hyponatremia related to decreased PO intake   - repeat in AM after IVF    DVT prophylaxis:  mechanical    CODE STATUS:  Full code  Code Status and Medical Interventions:   Ordered at: 10/08/19 9967     Level Of Support Discussed With:    Patient     Code Status:    CPR     Medical Interventions (Level of Support Prior to Arrest):    Full       Admission Status:  I believe this patient meets INPATIENT status due to IV ABX.  I feel patient’s risk for adverse outcomes and need for care warrant INPATIENT evaluation and I predict the patient’s care encounter to likely last beyond 2 midnights.    Electronically signed by Yasmany Martinez PA-C, 10/08/19, 11:42 PM.      Brief Attending Admission Attestation     I have seen and examined the patient, performing an independent  face-to-face diagnostic evaluation with plan of care reviewed and developed with the advanced practice clinician (APC).      Brief Summary Statement:   Kendrick Crystal is a 49 y.o. male with PMHx significant for uncontrolled DMII with neuropathy, HLD, HTN, DUNLAP cirrhosis, PVD s/p L BKA, BPH and recent admission for sepsis related to perirectal abscess with fistula formation. He underwent I&D with Dr. Payne and was followed by infectious disease. He presents today with complaints of dry heaving, fever, flank pain and elevated blood sugars. Patient says his symptoms began 3 days ago and continued to get progressively worse. He has not been able to keep anything down PO for several days. His blood sugars have been running 400-500 at home (although he says he normally runs 300-400). He reports associated weakness and malaise. In the ED CT abd/pelvis revealed right perinephric abscess. WBC 30K. UA grossly positive.    Remainder of detailed HPI is as noted above and has been reviewed and/or edited by me for completeness.      Attending Physical Exam:  Constitutional: Awake, alert, patient appears chronically ill and older than stated age  Eyes: PERRLA, sclerae anicteric, no conjunctival injection  HENT: NCAT, mucous membranes dry  Neck: Supple, no thyromegaly, no lymphadenopathy, trachea midline  Respiratory: Clear to auscultation bilaterally, nonlabored respirations   Cardiovascular: tachycardic, no murmurs, rubs, or gallops, palpable pedal pulses bilaterally  Gastrointestinal: Positive bowel sounds, soft, nontender, nondistended  Musculoskeletal: No bilateral ankle edema, no clubbing or cyanosis to extremities., L BKA  Psychiatric: Appropriate affect, cooperative  Neurologic: Oriented x 3, strength symmetric in all extremities, Cranial Nerves grossly intact to confrontation, speech clear  Skin: No rashes        Brief Assessment/Plan :  See above for further detailed assessment and plan developed with APC which I have  "reviewed and/or edited for completeness.      Electronically signed by Margo Tafoya DO, 10/09/19, 12:36 AM.             Electronically signed by Margo Tafoya DO at 10/09/19 0047       Hospital Medications (active)       Dose Frequency Start End    acetaminophen (TYLENOL) 160 MG/5ML solution 650 mg 650 mg Every 4 Hours PRN 10/9/2019     Sig - Route: Take 20.3 mL by mouth Every 4 (Four) Hours As Needed for Mild Pain . - Oral    Linked Group 1:  \"Or\" Linked Group Details        acetaminophen (TYLENOL) suppository 650 mg 650 mg Every 4 Hours PRN 10/9/2019     Sig - Route: Insert 1 suppository into the rectum Every 4 (Four) Hours As Needed for Mild Pain . - Rectal    Linked Group 1:  \"Or\" Linked Group Details        acetaminophen (TYLENOL) tablet 650 mg 650 mg Every 4 Hours PRN 10/9/2019     Sig - Route: Take 2 tablets by mouth Every 4 (Four) Hours As Needed for Mild Pain . - Oral    Linked Group 1:  \"Or\" Linked Group Details        amLODIPine (NORVASC) tablet 10 mg 10 mg Every 24 Hours Scheduled 10/9/2019     Sig - Route: Take 1 tablet by mouth Daily. - Oral    dextrose (D50W) 25 g/ 50mL Intravenous Solution 25 g 25 g Every 15 Minutes PRN 10/9/2019     Sig - Route: Infuse 50 mL into a venous catheter Every 15 (Fifteen) Minutes As Needed for Low Blood Sugar (Blood Sugar Less Than 70). - Intravenous    dextrose (GLUTOSE) oral gel 15 g 15 g Every 15 Minutes PRN 10/9/2019     Sig - Route: Take 15 application by mouth Every 15 (Fifteen) Minutes As Needed for Low Blood Sugar (Blood sugar less than 70). - Oral    DULoxetine (CYMBALTA) DR capsule 30 mg 30 mg Daily 10/9/2019     Sig - Route: Take 1 capsule by mouth Daily. - Oral    ertapenem (INVanz) 1 g/100 mL 0.9% NS VTB (mbp) 1 g Every 24 Hours Scheduled 10/23/2019 11/2/2019    Sig - Route: Infuse 100 mL into a venous catheter Daily. - Intravenous    glucagon (human recombinant) (GLUCAGEN DIAGNOSTIC) injection 1 mg 1 mg Every 15 Minutes PRN 10/9/2019     Sig - " "Route: Inject 1 mg under the skin into the appropriate area as directed Every 15 (Fifteen) Minutes As Needed for Low Blood Sugar (Blood Glucose Less Than 70). - Subcutaneous    HYDROcodone-acetaminophen (NORCO)  MG per tablet 1 tablet 1 tablet 3 Times Daily PRN 10/9/2019     Sig - Route: Take 1 tablet by mouth 3 (Three) Times a Day As Needed for Moderate Pain  or Severe Pain . - Oral    insulin detemir (LEVEMIR) injection 20 Units 20 Units Every 12 Hours Scheduled 10/9/2019     Sig - Route: Inject 20 Units under the skin into the appropriate area as directed Every 12 (Twelve) Hours. - Subcutaneous    insulin lispro (humaLOG) injection 0-9 Units 0-9 Units 4 Times Daily With Meals & Nightly 10/9/2019     Sig - Route: Inject 0-9 Units under the skin into the appropriate area as directed 4 (Four) Times a Day With Meals & at Bedtime. - Subcutaneous    insulin lispro (humaLOG) injection 4 Units 4 Units 3 Times Daily With Meals 10/17/2019     Sig - Route: Inject 4 Units under the skin into the appropriate area as directed 3 (Three) Times a Day With Meals. - Subcutaneous    Magnesium Sulfate 2 gram / 50mL Infusion (GIVE X 3 BAGS TO EQUAL 6GM TOTAL DOSE) - Mg 1.1 - 1.5 mg/dl 2 g As Needed 10/18/2019     Sig - Route: Infuse 50 mL into a venous catheter As Needed (See Administration Instructions). - Intravenous    Linked Group 2:  \"Or\" Linked Group Details        Magnesium Sulfate 2 gram Bolus, followed by 8 gram infusion (total Mg dose 10 grams)- Mg less than or equal to 1mg/dL 2 g As Needed 10/18/2019     Sig - Route: Infuse 50 mL into a venous catheter As Needed (See Administration Instructions). - Intravenous    Linked Group 2:  \"Or\" Linked Group Details        Magnesium Sulfate 4 gram infusion- Mg 1.6-1.9 mg/dL 4 g As Needed 10/18/2019     Sig - Route: Infuse 100 mL into a venous catheter As Needed (See Administration Instructions). - Intravenous    Linked Group 2:  \"Or\" Linked Group Details        melatonin tablet " "5 mg 5 mg Nightly PRN 10/9/2019     Sig - Route: Take 1 tablet by mouth At Night As Needed for Sleep. - Oral    metoprolol tartrate (LOPRESSOR) tablet 100 mg 100 mg Every 12 Hours Scheduled 10/9/2019     Sig - Route: Take 1 tablet by mouth Every 12 (Twelve) Hours. - Oral    nystatin (MYCOSTATIN) powder  Every 12 Hours Scheduled 10/17/2019     Sig - Route: Apply  topically to the appropriate area as directed Every 12 (Twelve) Hours. - Topical    ondansetron (ZOFRAN) injection 4 mg 4 mg Every 6 Hours PRN 10/9/2019     Sig - Route: Infuse 2 mL into a venous catheter Every 6 (Six) Hours As Needed for Nausea or Vomiting. - Intravenous    potassium chloride (KLOR-CON) packet 40 mEq 40 mEq As Needed 10/11/2019     Sig - Route: Take 40 mEq by mouth As Needed (potassium replacement, see admin instructions). - Oral    Linked Group 3:  \"Or\" Linked Group Details        potassium chloride (MICRO-K) CR capsule 40 mEq 40 mEq As Needed 10/11/2019     Sig - Route: Take 4 capsules by mouth As Needed (Potassium Replacement.  See Admin Instructions). - Oral    Linked Group 3:  \"Or\" Linked Group Details        potassium chloride 10 mEq in 100 mL IVPB 10 mEq Every 1 Hour PRN 10/11/2019     Sig - Route: Infuse 100 mL into a venous catheter Every 1 (One) Hour As Needed (Potassium Replacement - See Admin Instructions). - Intravenous    Linked Group 3:  \"Or\" Linked Group Details        saccharomyces boulardii (FLORASTOR) capsule 250 mg 250 mg 2 Times Daily 10/15/2019     Sig - Route: Take 1 capsule by mouth 2 (Two) Times a Day. - Oral    sevelamer (RENAGEL) tablet 800 mg 800 mg 3 Times Daily With Meals 10/11/2019     Sig - Route: Take 1 tablet by mouth 3 (Three) Times a Day With Meals. - Oral    sodium chloride 0.9 % flush 10 mL 10 mL Every 12 Hours Scheduled 10/9/2019     Sig - Route: Infuse 10 mL into a venous catheter Every 12 (Twelve) Hours. - Intravenous    sodium chloride 0.9 % flush 10 mL 10 mL As Needed 10/9/2019     Sig - Route: " Infuse 10 mL into a venous catheter As Needed for Line Care. - Intravenous    sodium chloride 0.9 % flush 10 mL 10 mL Every 12 Hours Scheduled 10/15/2019     Sig - Route: Infuse 10 mL into a venous catheter Every 12 (Twelve) Hours. - Intravenous    Cosign for Ordering: Accepted by Lucía Calvin MD on 10/16/2019  7:06 AM    sodium chloride 0.9 % flush 10 mL 10 mL As Needed 10/15/2019     Sig - Route: Infuse 10 mL into a venous catheter As Needed for Line Care. - Intravenous    Cosign for Ordering: Accepted by Lucía Calvin MD on 10/16/2019  7:06 AM    terazosin (HYTRIN) capsule 5 mg 5 mg Nightly 10/9/2019     Sig - Route: Take 1 capsule by mouth Every Night. - Oral    vancomycin oral solution reconstituted 125 mg 125 mg Every 6 Hours Scheduled 10/15/2019 10/29/2019    Sig - Route: Take 2.5 mL by mouth Every 6 (Six) Hours. - Oral    witch hazel-glycerin (TUCKS) pad  As Needed 10/18/2019     Sig - Route: Apply  topically to the appropriate area as directed As Needed for Irritation. - Topical    piperacillin-tazobactam (ZOSYN) 3.375 g in iso-osmotic dextrose 50 ml (premix) (Discontinued) 3.375 g Every 8 Hours 10/19/2019 10/23/2019    Sig - Route: Infuse 50 mL into a venous catheter Every 8 (Eight) Hours. - Intravenous    sodium chloride 0.9 % infusion (Discontinued) 100 mL/hr Continuous 10/9/2019 10/22/2019    Sig - Route: Infuse 100 mL/hr into a venous catheter Continuous. - Intravenous             Physician Progress Notes (most recent note)      Usman Dong MD at 10/23/19 1207          [facility] Progress Note        Antibiotics:  Anti-Infectives (From admission, onward)    Ordered     Dose/Rate Route Frequency Start Stop    10/23/19 1206  ertapenem (INVanz) 1 g/100 mL 0.9% NS VTB (mbp)     Ordering Provider:  Usman Dong MD    1 g  over 30 Minutes Intravenous Every 24 Hours 10/23/19 1300 11/02/19 1259    10/19/19 0957  piperacillin-tazobactam (ZOSYN) 3.375 g in iso-osmotic dextrose 50 ml (premix)      Ordering Provider:  Usman Dong MD    3.375 g  over 30 Minutes Intravenous Once 10/19/19 1045 10/19/19 1257    10/15/19 1127  vancomycin oral solution reconstituted 125 mg     Jeyson Ne[facility] reviewed the order on 10/17/19 1241.   Ordering Provider:  Usman Dong MD    125 mg Oral Every 6 Hours Scheduled 10/15/19 1215 10/29/19 1159    10/09/19 1136  meropenem (MERREM) 1 g/100 mL 0.9% NS VTB (mbp)     Ordering Provider:  Usman Dong MD    1 g  over 30 Minutes Intravenous Once 10/09/19 1230 10/09/19 1251    10/08/19 2338  piperacillin-tazobactam (ZOSYN) 3.375 g in iso-osmotic dextrose 50 ml (premix)     Ordering Provider:  Yasmany Martinez PA-C    3.375 g  over 30 Minutes Intravenous Once 10/08/19 2340 10/09/19 0016          CC: fatigue    HPI:  Patient is a 49 y.o.  Yr old male with history of poorly contolled T2DM, DUNLAP/liver cirrhosis, HTN, PVD/Left BKA, and multiple skin abscesses/MRSA who presented to [facility] ED with right shin wound infection summer 2018.  He was unable to get to see Dr. Martinez as his father passed away unexpectedly on 18 and he had to wait until after the .  The wound worsened becoming gangrenous and he came to [facility] ED in 2018.  He also had been hospitalized at [facility] and then transferred to  for a severe penis/scrotum infection requiring surgical debridement in summer 2018.  He received antibiotics regarding his right leg infection, generally improved over the remainder of summer 2018 but that area had not completely healed. He was admitted 2019 with acute left lower abdominal wall redness/swelling and pain, spontaneous drainage associated with fever/chills and uncontrolled blood sugars.   I&D requiring wide debridement , severe/deep infection and transferred to [facility] on May 3 with IV Zosyn/oral doxycycline. Cultures had lactobacillus and mixed gram-positive skin sherly including alpha strep, staph  epidermidis and corynebacterium. Readmitted to [facility] May 18, 2019, increasing generalized edema ultimately transitioned to oral doxycycline and healed.     He presented to the emergency room July 30, 2019 with acute rectal pain that had begun 7/27; this is associated with constipation for days, chills and nausea/dry heaving.  White blood cell count elevated, high hemoglobin A1c over 13, urinalysis with hematuria/pyuria and CT scan with 3 x 3 cm fluid collection anterior to the distal rectum and per discussion with Dr. Akbar/Jennifer, this is not in the prostate.  Colorectal surgery/urology saw him for consideration of surgical options/timing/threshold, etc..     8/2/19 I&Dper Dr Payne perirectal abscess; patient reports that he had instrumented his rectum prior to hospitalization with enema     8/3/19 urinary retention overnight requiring in/out catheterization per patient.  Creat climb     8/4/19 further creat climb; childs placed and lisinopril stopped and d/w Dr Rubio;  ?obstruction initially associated with retention postop (1200 out with I/O cath postop per nursing) -v- contrast from CT -v- lisinopril/medication/vancomycin -v- relative hypotension -v- likely combination of factors with ATN; nephrology following; vancomycin trough high and vancomycin stopped empirically 8/3 although high trough potentially accumulation as a consequence of diminishing renal function associated with retention/contrast/pressure rather than cause.  Nonetheless, avoid for now; creatinine trending down.        8/7 in retrospect he remembers air in his urine at admit which has raised concern for possible fistula from urinary tract;  No fecaluria and culture from abscess is fecal sherly;  ?rectal-urethral fistula;  Dr Payne aware     Culture with E. coli/Klebsiella species and creatinine generally trended down, transitioned to oral antibiotics at discharge.     Readmitted August 17, 2019 with nausea/vomiting/dry heaves for  "3 days.  Walker catheter was placed and CT scan of the abdomen showed:      \"Previously seen fluid collection identified inferior to the prostate gland is more increased in density on today's examination. There is wall thickening again seen throughout the bladder. Findings again are concerning for cystitis.\" per radiology     He was transitioned to oral omnicef/topical antifungal on approx 8/23/19; Noncompliant with f/u in our office     READMITTED 10/8/19 RLQ abd pain, urinary retention, nausea, dysuria and generalized fatigue     UTI by U/A, subsequent blood cultures positive for GNR and initial PCR with kleb sp, no carbapenem resistance by initial PCR per my d/w micro;  Abnormal CT abd with right perinephric fluid collection, advanced cirrhosis, urinary bladder thickening.  10/9 Aspirate of perinephric fluid collection consistent with abscess/gram-negative lon and white blood cells     10/16/19 cytoscopy with bullous change per Dr Gutierres but no fistula per him    10/19/19 intermittent nausea, no vomiting or dry heaves this am, intermittent dry cough broadened to zosyn with ?aspiration pneumonia evolving after dry heaves/vomiting    10/23/19 feeling better per him and he wants home; cough less and dry;  No hemoptysis; denies new pain;   no diarrhea; He currently denies abd pain; no overt hematuria or pyuria;  He denied any  fecaluria prior to admit to me.       No headache photophobia or neck stiffness.  No shortness of breath or hemoptysis.   No hematochezia melena or hematemesis.  No skin rash.     ROS:      10/23/19 No f/c/s. No n/v/d. No rash. No new ADR to Abx.     Constitutional-- No Fever, chills or sweats.  Appetite diminished with fatigue  Heent-- No new vision, hearing or throat complaints.  No epistaxis or oral sores.  Denies odynophagia or dysphagia.  No flashers, floaters or eye pain. No odynophagia or dysphagia. No headache, photophobia or neck stiffness.  CV-- No chest pain, palpitation or " "syncope  Resp-- as above  GI-  No hematochezia, melena, or hematemesis. Denies jaundice.  -- No dysuria, hematuria, or flank pain.  Denies hesitancy, urgency or flank pain.  Lymph- no swollen lymph nodes in neck/axilla or groin.   Heme- No active bruising or bleeding; no Hx of DVT or PE.  MS-- no swelling or pain in the bones or joints of arms/legs.  No new back pain.  Neuro-- No acute focal weakness or numbness in the arms or legs.  No seizures.     Full 12 point review of systems reviewed and negative otherwise for acute complaints, except for above      PE:   /97 (BP Location: Left arm, Patient Position: Lying)   Pulse 76   Temp 97.8 °F (36.6 °C) (Oral)   Resp 18   Ht 190.5 cm (75\")   Wt 112 kg (246 lb)   SpO2 94%   BMI 30.75 kg/m²      GENERAL: awake, in no acute distress.   HEENT: Normocephalic, atraumatic.  PERRL. EOMI. No conjunctival injection. No icterus. Oropharynx clear without evidence of thrush or exudate. No evidence of peridontal disease.    NECK: Supple without nuchal rigidity. No mass.  LYMPH: No cervical, axillary or inguinal lymphadenopathy.  HEART: RRR; No murmur, rubs, gallops.   LUNGS: Diminished at bases bilaterally with scattered rhonchi. Normal respiratory effort. Nonlabored. No dullness.  ABDOMEN: Soft, nontender, nondistended. Positive bowel sounds. No rebound or guarding. NO mass or HSM.  EXT:  No cyanosis, clubbing or edema. No cord.  : no new findings; +childs  MSK: FROM without joint effusions noted arms/legs.    SKIN: Warm and dry without cutaneous eruptions on Inspection/palpation.    NEURO: awake     Amputee status noted with left BKA     No CVA tenderness     No peripheral stigmata/phenomena of endocarditis    Laboratory Data    Results from last 7 days   Lab Units 10/22/19  0514 10/21/19  0634 10/20/19  0536   WBC 10*3/mm3 15.49* 15.94* 16.90*   HEMOGLOBIN g/dL 9.1* 9.1* 8.4*   HEMATOCRIT % 29.3* 28.9* 27.7*   PLATELETS 10*3/mm3 369 421 536     Results from last 7 " days   Lab Units 10/22/19  0514   SODIUM mmol/L 141   POTASSIUM mmol/L 3.8   CHLORIDE mmol/L 103   CO2 mmol/L 29.0   BUN mg/dL 11   CREATININE mg/dL 0.47*   GLUCOSE mg/dL 129*   CALCIUM mg/dL 8.2*     Results from last 7 days   Lab Units 10/21/19  0634   ALK PHOS U/L 246*   BILIRUBIN mg/dL <0.2*   ALT (SGPT) U/L 15   AST (SGOT) U/L 19               Estimated Creatinine Clearance: 251.2 mL/min (A) (by C-G formula based on SCr of 0.47 mg/dL (L)).      Microbiology:      Radiology:  Imaging Results (last 72 hours)     Procedure Component Value Units Date/Time    CT Guided Abscess Drain Retroperitoneal [[id number]] Collected:  10/09/19 1611     Updated:  10/09/19 1715    Narrative:       EXAMINATION: CT GUIDED ABSCESS DRAIN RETROPERITONEAL- 10/09/2019     INDICATION: N15.1-Renal and perinephric abscess; A41.9-Sepsis,  unspecified organism; E86.0-Dehydration; E11.65-Type 2 diabetes mellitus  with hyperglycemia; I10-Essential (primary) hypertension;  Z86.14-Personal history of Methicillin resistant Staphylococcus aureus  infection; K74.60-Unspecified cirrhosis of liver; fluid collection     TECHNIQUE: CT-guided drainage of a perinephric fluid collection     The radiation dose reduction device was turned on for each scan per the  ALARA (As Low as Reasonably Achievable) protocol.     COMPARISON: NONE     FINDINGS: Patient referred for CT-guided drainage of a perinephric fluid  collection. Procedure and risks were explained to the patient. Informed  consent was obtained and signed. Patient placed in the left lateral  position upon the table. The right flank was prepped and draped in a  sterile fashion. 1% lidocaine used as a local anesthetic. Under CT  fluoroscopic guidance an 18-gauge 15 cm Chiba needle was advanced into  the perinephric fluid collection of approximately 10 ml of a reddish  fluid was removed with some debris within the fluid. There is a striated  delayed nephrogram seen of the right kidney suggesting  possible  pyelonephritis with surrounding stranding of the perinephric fat.  Myelolipoma seen on the adrenal gland on the left. Chiba needle was  removed and no immediate complication occurred. Slight decrease seen in  size of the perinephric fluid collection status post drainage.       Impression:       CT-guided drainage of a perinephric fluid collection with no  immediate complication.     D:  10/09/2019  E:  10/09/2019     This report was finalized on 10/9/2019 5:12 PM by Dr. Nella Enriquez MD.       CT Abdomen Pelvis With Contrast [[id number]] Collected:  10/08/19 2202     Updated:  10/08/19 2222    Narrative:       CT ABDOMEN AND PELVIS WITH CONTRAST, 10/8/2019    HISTORY:  49-year-old male in the ED complaining of 2 day history right side abdomen pain, nausea, vomiting and generalized weakness. History of hepatic cirrhosis (DUNLAP). History of diabetes with left BKA and multiple abdominal wall abscesses and lower extremity  cellulitis in 2018.    TECHNIQUE:  CT imaging of the abdomen and pelvis with IV contrast. Radiation dose reduction techniques included automated exposure control. Radiation audit for CT and nuclear cardiology exams in the last 12 months: 7.    COMPARISON:  *  CT abdomen/pelvis, 8/18/2019.    ABDOMEN FINDINGS:  Images show a right lower perinephric fluid collection that is either subcapsular or pericapsular. This extends along the lower kidney for a length of about 8.5 cm and has a maximum thickness of 2.5 cm. The fluid is mixed attenuation and may be  hemorrhagic. Correlate for any recent history of blunt trauma to the flank. Renal parenchyma shows no disruption or abnormal enhancement. There is no fracture of overlying right lower posterior ribs or transverse spinous processes. Given the history,  perinephric abscess is possible but is significantly less likely given normal renal parenchymal enhancement. No evidence of urinary obstruction. No visible nephrolithiasis.    Advanced hepatic  cirrhosis. Mild splenomegaly. No suspicious liver lesion. No upper abdominal ascites. Hepatic vasculature appears patent. No gallbladder distention or bile duct dilatation. Negative pancreas.    Benign left adrenal myelolipoma measuring about 2.9 cm. Normal caliber abdominal aorta. Small bowel and colon are normal in caliber and appearance, as imaged. The appendix is normal. No gastric distention or distal esophageal dilatation.    PELVIS FINDINGS:  Air within the urinary bladder, likely iatrogenic. There is at least mild diffuse bladder wall thickening. Prostate and rectum are unremarkable. No inguinal hernia.    Lung base images show multiple benign calcified granulomas.      Impression:       1.  Small mixed attenuation right perinephric fluid collection along the inferior capsule, not present on the prior exam. Perinephric hematoma is a consideration. Correlate clinically for any history of blunt flank trauma. While abscess is possible, this  is unlikely as right renal parenchyma enhances normally with no evidence of pyelonephritis. Laboratory correlation recommended.  2.  Advanced hepatic cirrhosis. Splenomegaly.  3.  Urinary bladder wall thickening. Gas within the bladder is most likely iatrogenic.  4.  Benign left adrenal myelolipoma.    Signer Name: Fadi Healy MD   Signed: 10/8/2019 10:02 PM   Workstation [facility]    [facility]    XR Chest PA & Lateral [[id number]] Collected:  10/08/19 2210     Updated:  10/08/19 2212    Narrative:       CHEST X-RAY, 10/8/2019      HISTORY:    49-year-old male in the ED with reported diabetic ketoacidosis.      TECHNIQUE:  AP portable upright chest series.      FINDINGS:  Heart size and pulmonary vascularity are normal. The lungs are expanded and clear. No visible pulmonary infiltrate or pleural effusion. Lower lung benign calcified granulomas.      Impression:       Negative chest.    Signer Name: Fadi Healy MD   Signed:  10/8/2019 10:10 PM   Workstation Name: [email or web address]    [facility] [place]            Impression:      --Acute Kleb septicemia/sepsis, likely urinary source/pyelnoephritis associated with urinary retention and  with abnormal CT scan with perinephric collection/abscess and Kleb on aspirate;  IV  Invanz ongoing; urology to determine any timing/option or threshold for further intervention or functional/anatomic assessment.    --Acute perinephric abscess as above;  Likely to need repeat imaging in upcoming weeksto assess for progress -v- other sequelae; d/w medicine ;  Urology following to help guide timing/option/threshold for further drainage (perc -v- other) and currently they prefer observe with current therapy given little fluid able to be aspirated per them initially and improving clinically at present with abx therapy    --abnormal imaging with dry cough and probable pneumonia;  At risk for aspiration with recent vomiting/dry heaves and empiric zosyn started 10/19; changed to Invanz 10/23 to help facilitate home therapy with mom;  If productive cough, then send for culture; septic pulm emboli with kleb less likely    --acute loose stools/intermittent diarrhea improved per him; with Hx CDiff per him/family;  Empiric oral vancomycin added with high risk for relapse     --Hx perirectal abscess ; Colorectal surgery, Dr. Payne previously saw and is following.  Drainage as above on August 2 with mxed Fecal sherly in culture; CT scans  with no mention of abscess on 8/18 study;  Dr Payne  following given urology conern for fistula and to help determine any timing/option or threshold for further GI/colorectal work-up     --History urinary retention.  urology following     --Hx ARF in August 2019;  ?obstruction initially associated with retention postop (1200 out with I/O cath postop per nursing) -v- contrast from CT -v- lisinopril/medication/vancomycin -v- relative hypotension -v- likely combination of  factors with ATN;  vancomycin trough high and vancomycin stopped empirically 8/3 although high trough potentially accumulation as a consequence of diminishing renal function associated with retention/contrast/pressure rather than cause.  Nonetheless, avoid for now; likely further acute kidney injury at admission August 17 associated with dehydration/prerenal/ATN etiology     --History MRSA;  Not in current culture     --Diabetes mellitus type 2, uncontrolled.  He reports the reason for this is forgetfulness     --History Johnston and chronic liver disease/cirrhosis  per past notes     --Left BKA     --Peripheral vascular disease     --medical noncompliance    PLAN:     --IV Invanz/oral vancomycin      --Check/review labs cultures and scans     --Discussed with microbiology     --History per nursing staff      --Discussed with family, partial history per them     --Highly complex set of issues with high risk for further serious morbidity and other serious sequela     --Colorectal surgery and urology have followed    --D/w Dr Vora    **intermittent nausea/dry heaves 10/18;  abd exam benign;  S/p repeat CT with persistent collection that may require repeat aspiration/drainage although urology prefers hold off and continue current abx for now;  Empiric broadening of abx 10/19 as above with aspiration risk and probable pneumonia;  Generally doing better 10/19-10/22 with these adjustments.    **patient and family insisting on home IV abx;  They know expectations for compliance;  Invanz 1 gm IV daily, continue oral vancomycin and f/u with me on Friday in clinic 10/25      Usman Dong MD  10/23/2019        Electronically signed by Usman Dong MD at 10/23/19 1210          Consult Notes (most recent note)      Mumtaz Payne MD at 10/10/19 0642      Consult Orders    1. Inpatient Colorectal Surgery Consult [[id number]] ordered by Usman Dong MD at 10/09/19 1221                Kendrick Crystal     LOS: 2  days   No care team member to display    Chief Complaint: Perinephric abscess          Subjective   History of Present Illness:   The patient is a 49 year-old white male with poorly controlled type 1 diabetes.  The patient has had multiple secondary complications related to his diabetes including blindness in his left eye, nonalcoholic steatohepatitis with cirrhosis, peripheral vascular disease resulting in left below-knee amputation.  He has had several skin infections and an abdominal wall abscess drained by general surgery several months ago.  I saw him on 7/31/2019 because of perineal pain thought secondary to hemorrhoids.  CT scan at that time showed a 3 x 3.2 cm fluid collection with enhancing margins abscess located at the inferior aspect of the prostate gland.  To the distal rectum.  Review with the radiologist at that time did not show any involvement into the rectum.  I ask urology to see the patient because of its proximity to the prostate.  He was evaluated by Dr. Rodriguez who stated he reviewed the findings of the abscess on the CT in relation to lower urologic anatomy.  He felt there is no evidence that this was prostatic or periprosthetic abscess or a urethral abscess and deferred all management of the patient to infectious disease and myself.  Following this evaluation I took the patient to surgery.  He had transrectal drainage of the abscess collection.  Initially after the procedure he had some pneumaturia.  His abscess resolved and the pneumaturia resolved and he was discharged home.  Beginning approximately 2 to 3 weeks ago he again noticed pneumaturia.  He developed fever and general dysphoria.  He has not been able to control his glucose as well since discharge.  This is been a chronic problem.  The patient underwent CT scan in the emergency room yesterday which showed advanced hepatic cirrhosis and splenomegaly, small mixed attenuation right perinephric fluid along the inferior capsule, urinary  bladder wall thickening with gas in the bladder, and benign left adrenal myelolipoma.  There was no evidence of residual abscess in the perirectal area and this area appears to have healed well on CT scan.  The patient subsequently had percutaneous drainage of the perinephric fluid which is now showing 4+ white blood cells and 3+ gram-negative bacilli.  The patient is on Merrem and is feeling better.  His white blood cell count is dropped from 29 to 25.        Past Medical History:   Diagnosis Date   • Abdominal wall cellulitis 5/20/2019   • JUAN (acute kidney injury) (CMS/HCC) 7/29/2018   • Arthritis    • Bipolar 1 disorder (CMS/HCC)    • Cellulitis of right anterior lower leg 7/29/2018   • Cirrhosis (CMS/HCC)    • Counseling for insulin pump    • Depression    • Diabetes mellitus (CMS/HCC)    • Encephalopathy, hepatic (CMS/HCC)    • H/O degenerative disc disease    • History of Lissa's gangrene     right leg/testicle   • Hyperlipemia    • Hypertension    • multiple Skin abscesses    • DUNLAP, bx showed stage IV fibrosis    • Neuropathy    • Peripheral vascular disease (CMS/HCC)    • Thrombophlebitis      Past Surgical History:   Procedure Laterality Date   • ABDOMINAL WALL ABSCESS INCISION AND DRAINAGE N/A 4/26/2019    Procedure: ABDOMINAL WALL DEBRIDEMENT;  Surgeon: Fadi Kern MD;  Location:  [facility];  Service: General   • AMPUTATION DIGIT Left 1/30/2017    Procedure: left fourth and fifth transmetatarsal toe amputation ;  Surgeon: Juancho Martinez MD;  Location:  [facility];  Service:    • BELOW KNEE AMPUTATION Left 3/2/2017    Procedure: AMPUTATION BELOW KNEE, SHMUEL;  Surgeon: Juancho Martinez MD;  Location:  [facility];  Service:    • ENDOSCOPY     • HEMORRHOIDECTOMY N/A 8/2/2019    Procedure: EXCISION AND DRAIN PERIRECTAL ABSCESS;  Surgeon: Mumtaz Payne MD;  Location:  [facility];  Service: General   • LEG SURGERY     • TESTICLE SURGERY Right     DEBRIDEMENT FROM GANGRENE   • TONSILLECTOMY  1975     No  current facility-administered medications on file prior to encounter.      Current Outpatient Medications on File Prior to Encounter   Medication Sig Dispense Refill   • amLODIPine (NORVASC) 10 MG tablet Take 1 tablet by mouth Daily. 30 tablet 0   • aspirin 81 MG tablet Take 1 tablet by mouth Daily. 30 tablet 11   • DULoxetine (CYMBALTA) 30 MG capsule Take 1 capsule by mouth Daily. 90 capsule 1   • hydrALAZINE (APRESOLINE) 50 MG tablet      • HYDROcodone-acetaminophen (NORCO)  MG per tablet Take 1 tablet by mouth 3 (Three) Times a Day As Needed for Moderate Pain  or Severe Pain .     • insulin lispro (humaLOG) 100 UNIT/ML injection Inject 5 Units under the skin into the appropriate area as directed 4 (Four) Times a Day With Meals & at Bedtime. Plus sliding scale 60 mL 5   • LANTUS 100 UNIT/ML injection Inject 30 Units under the skin into the appropriate area as directed 2 (Two) Times a Day. 60 mL 1   • metoprolol tartrate (LOPRESSOR) 100 MG tablet Take 100 mg by mouth Every 12 (Twelve) Hours.     • nystatin (MYCOSTATIN) 083567 UNIT/GM cream Apply  topically to the appropriate area as directed 2 (Two) Times a Day. 30 g 0   • oxyCODONE (ROXICODONE) 10 MG tablet      • promethazine (PHENERGAN) 12.5 MG tablet Take 1 tablet by mouth Every 6 (Six) Hours As Needed for Nausea or Vomiting. 20 tablet 0   • sevelamer (RENVELA) 0.8 g pack packet Take 1 packet by mouth 3 (Three) Times a Day With Meals. 90 packet 0   • tamsulosin (FLOMAX) 0.4 MG capsule 24 hr capsule Take 2 capsules by mouth Every Night. 180 capsule 1   • terazosin (HYTRIN) 5 MG capsule Take 1 capsule by mouth Every Night. 30 capsule 0     No Known Allergies  Family History   Problem Relation Age of Onset   • Arthritis Mother    • Hypertension Mother    • Kidney disease Mother    • Stroke Mother    • Heart attack Father    • Arthritis Father    • Diabetes Father    • Hyperlipidemia Father    • Hypertension Father    • Mental illness Sister    • Diabetes  "Brother    • Hypertension Brother    • Obesity Brother      Social History     Socioeconomic History   • Marital status:      Spouse name: Not on file   • Number of children: 1   • Years of education: Not on file   • Highest education level: Not on file   Occupational History   • Occupation: Security     Comment: Disabled-Diabetic Retinopathy   Tobacco Use   • Smoking status: Never Smoker   • Smokeless tobacco: Never Used   Substance and Sexual Activity   • Alcohol use: No   • Drug use: No   • Sexual activity: Defer   Social History Narrative    Lives in [facility]       Review of Systems: See history and physical for full review of systems   Objective     Vital Signs  Blood pressure 93/68, pulse 77, temperature 98.4 °F (36.9 °C), temperature source Oral, resp. rate 18, height 190.5 cm (75\"), weight 112 kg (247 lb 9.6 oz), SpO2 92 %.    Physical Exam:  Constitutional: No acute distress, awake, alert  Eyes: PERRLA,  no conjunctival injection, blind in left eye  HENT: NCAT, mucous membranes moist  Neck: Supple, no thyromegaly, no lymphadenopathy, trachea midline  Respiratory: Clear to auscultation bilaterally, nonlabored respirations   Cardiovascular: RRR, no murmurs, rubs, or gallops, Gastrointestinal: Positive bowel sounds, soft, nontender, nondistended.  No lesions on the abdominal wall  Musculoskeletal: The patient is status post left BKA   Psychiatric: Appropriate affect, cooperative  Neurologic: Oriented x 3, strength symmetric in all extremities, speech clear  Skin: No rashes    Results Review:       WBC WBC   Date Value Ref Range Status   10/10/2019 25.20 (H) 3.40 - 10.80 10*3/mm3 Final   10/09/2019 29.99 (C) 3.40 - 10.80 10*3/mm3 Final   10/08/2019 30.92 (C) 3.40 - 10.80 10*3/mm3 Final      HGB Hemoglobin   Date Value Ref Range Status   10/10/2019 9.6 (L) 13.0 - 17.7 g/dL Final   10/09/2019 10.2 (L) 13.0 - 17.7 g/dL Final   10/08/2019 11.4 (L) 13.0 - 17.7 g/dL Final      HCT Hematocrit   Date Value " Ref Range Status   10/10/2019 29.7 (L) 37.5 - 51.0 % Final   10/09/2019 31.2 (L) 37.5 - 51.0 % Final   10/08/2019 34.6 (L) 37.5 - 51.0 % Final      PLT           Results from last 7 days   Lab Units 10/10/19  0444   PLATELETS 10*3/mm3 208            Sodium Sodium   Date Value Ref Range Status   10/09/2019 125 (L) 136 - 145 mmol/L Final   10/08/2019 124 (L) 136 - 145 mmol/L Final      Potassium Potassium   Date Value Ref Range Status   10/09/2019 3.6 3.5 - 5.2 mmol/L Final   10/08/2019 4.3 3.5 - 5.2 mmol/L Final      Chloride Chloride   Date Value Ref Range Status   10/09/2019 88 (L) 98 - 107 mmol/L Final   10/08/2019 84 (L) 98 - 107 mmol/L Final      Bicarbonate No results found for: PLASMABICARB   BUN BUN   Date Value Ref Range Status   10/09/2019 29 (H) 6 - 20 mg/dL Final   10/08/2019 33 (H) 6 - 20 mg/dL Final      Creatinine Creatinine   Date Value Ref Range Status   10/09/2019 0.75 (L) 0.76 - 1.27 mg/dL Final   10/08/2019 0.85 0.76 - 1.27 mg/dL Final                                      Imaging Results (last 24 hours)     Procedure Component Value Units Date/Time    CT Guided Abscess Drain Retroperitoneal [[id number]] Collected:  10/09/19 1611     Updated:  10/09/19 1715    Narrative:       EXAMINATION: CT GUIDED ABSCESS DRAIN RETROPERITONEAL- 10/09/2019     INDICATION: N15.1-Renal and perinephric abscess; A41.9-Sepsis,  unspecified organism; E86.0-Dehydration; E11.65-Type 2 diabetes mellitus  with hyperglycemia; I10-Essential (primary) hypertension;  Z86.14-Personal history of Methicillin resistant Staphylococcus aureus  infection; K74.60-Unspecified cirrhosis of liver; fluid collection     TECHNIQUE: CT-guided drainage of a perinephric fluid collection     The radiation dose reduction device was turned on for each scan per the  ALARA (As Low as Reasonably Achievable) protocol.     COMPARISON: NONE     FINDINGS: Patient referred for CT-guided drainage of a perinephric fluid  collection. Procedure and risks were  explained to the patient. Informed  consent was obtained and signed. Patient placed in the left lateral  position upon the table. The right flank was prepped and draped in a  sterile fashion. 1% lidocaine used as a local anesthetic. Under CT  fluoroscopic guidance an 18-gauge 15 cm Chiba needle was advanced into  the perinephric fluid collection of approximately 10 ml of a reddish  fluid was removed with some debris within the fluid. There is a striated  delayed nephrogram seen of the right kidney suggesting possible  pyelonephritis with surrounding stranding of the perinephric fat.  Myelolipoma seen on the adrenal gland on the left. Chiba needle was  removed and no immediate complication occurred. Slight decrease seen in  size of the perinephric fluid collection status post drainage.       Impression:       CT-guided drainage of a perinephric fluid collection with no  immediate complication.     D:  10/09/2019  E:  10/09/2019     This report was finalized on 10/9/2019 5:12 PM by Dr. Nella Enriquez MD.               Assessment/Plan     The patient has a perinephric abscess that is now been drained.  He also has had recurrent pneumaturia.  By CT scan his perirectal abscess has resolved.  He does not have any residual collection that needs to be drained.  Will defer to urology for possible cystoscopy to help evaluate the source of the pneumaturia.  Will follow along.  Certainly this is not a patient who would tolerate major surgery well.  If he does have a fistula from the bowel to the bladder conservative therapy would need to be one of the considerations.    Mumtaz Payne MD  10/10/19  6:43 AM          Electronically signed by Mumtaz Payne MD at 10/10/19 8859          Physical Therapy Notes (most recent note)      Sabrina Castano, PT at 10/11/19 7110  Version 1 of 1         Patient Name: Kendrick Crystal  : 1970    MRN: [id number]                              Today's Date: 10/11/2019       Admit  Date: 10/8/2019    Visit Dx:     ICD-10-CM ICD-9-CM   1. Perinephric abscess N15.1 590.2   2. Sepsis, due to unspecified organism, unspecified whether acute organ dysfunction present (CMS/HCC) A41.9 038.9     995.91   3. Dehydration E86.0 276.51   4. Uncontrolled type 2 diabetes mellitus with hyperglycemia (CMS/HCC) E11.65 250.02   5. Essential hypertension I10 401.9   6. History of MRSA infection Z86.14 V12.04   7. Cirrhosis of liver without ascites, unspecified hepatic cirrhosis type (CMS/HCC) K74.60 571.5   8. Impaired mobility and ADLs Z74.09 799.89     Patient Active Problem List   Diagnosis   • DUNLAP Cirrhosis   • Essential hypertension   • Non-compliance   • Hyperlipemia   • Hypertriglyceridemia, essential   • Sepsis affecting skin   • Type 2 diabetes mellitus with circulatory disorder and uncontrolled   • History of MRSA infection   • S/P BKA (below knee amputation), left (CMS/HCC)   • Peripheral vascular disease (CMS/HCC)   • DM (diabetes mellitus) type II uncontrolled, periph vascular disorder (CMS/HCC)   • Obesity (BMI 30-39.9)   • Gastroparesis   • Perinephric abscess   • Acute UTI (urinary tract infection)   • Bacteremia due to Klebsiella pneumoniae     Past Medical History:   Diagnosis Date   • Abdominal wall cellulitis 5/20/2019   • JUAN (acute kidney injury) (CMS/HCC) 7/29/2018   • Arthritis    • Bipolar 1 disorder (CMS/[facility])    • Cellulitis of right anterior lower leg 7/29/2018   • Cirrhosis (CMS/[facility])    • Counseling for insulin pump    • Depression    • Diabetes mellitus (CMS/[facility])    • Encephalopathy, hepatic (CMS/HCC)    • H/O degenerative disc disease    • History of Lissa's gangrene     right leg/testicle   • Hyperlipemia    • Hypertension    • multiple Skin abscesses    • DUNLAP, bx showed stage IV fibrosis    • Neuropathy    • Peripheral vascular disease (CMS/[facility])    • Thrombophlebitis      Past Surgical History:   Procedure Laterality Date   • ABDOMINAL WALL ABSCESS INCISION AND DRAINAGE N/A  4/26/2019    Procedure: ABDOMINAL WALL DEBRIDEMENT;  Surgeon: Fadi Kern MD;  Location:  [facility];  Service: General   • AMPUTATION DIGIT Left 1/30/2017    Procedure: left fourth and fifth transmetatarsal toe amputation ;  Surgeon: Juancho Martinez MD;  Location:  [facility];  Service:    • BELOW KNEE AMPUTATION Left 3/2/2017    Procedure: AMPUTATION BELOW KNEE, SHMUEL;  Surgeon: Juancho Martinez MD;  Location:  [facility];  Service:    • ENDOSCOPY     • HEMORRHOIDECTOMY N/A 8/2/2019    Procedure: EXCISION AND DRAIN PERIRECTAL ABSCESS;  Surgeon: Mumtaz Payne MD;  Location:  [facility];  Service: General   • LEG SURGERY     • TESTICLE SURGERY Right     DEBRIDEMENT FROM GANGRENE   • TONSILLECTOMY  1975     General Information     Row Name 10/11/19 1100          PT Evaluation Time/Intention    Document Type  evaluation  -     Mode of Treatment  physical therapy  -     Row Name 10/11/19 1100          General Information    Patient Profile Reviewed?  yes  -     Prior Level of Function  independent:;bed mobility;transfer;ADL's pivot and sliding transfers  -     Existing Precautions/Restrictions  fall;other (see comments) Left BKA  -     Barriers to Rehab  medically complex;previous functional deficit;ineffective coping  -     Row Name 10/11/19 1100          Relationship/Environment    Lives With  spouse  -     Row Name 10/11/19 1100          Resource/Environmental Concerns    Current Living Arrangements  home/apartment/condo  -     Row Name 10/11/19 1100          Cognitive Assessment/Intervention- PT/OT    Orientation Status (Cognition)  oriented to;person;place  -     Row Name 10/11/19 1100          Safety Issues, Functional Mobility    Safety Issues Affecting Function (Mobility)  awareness of need for assistance;insight into deficits/self awareness;safety precaution awareness;safety precautions follow-through/compliance;judgment  -     Impairments Affecting Function (Mobility)  endurance/activity  tolerance;balance;postural/trunk control;strength  -       User Key  (r) = Recorded By, (t) = Taken By, (c) = Cosigned By    Initials Name Provider Type    Sabrina Rivers PT Physical Therapist        Mobility     Row Name 10/11/19 1104          Bed Mobility Assessment/Treatment    Bed Mobility Assessment/Treatment  rolling left;rolling right;scooting/bridging;supine-sit;sit-supine  -LF     Rolling Left Ralls (Bed Mobility)  supervision;verbal cues  -LF     Rolling Right Ralls (Bed Mobility)  supervision;verbal cues  -LF     Scooting/Bridging Ralls (Bed Mobility)  dependent (less than 25% patient effort)  -LF     Supine-Sit Ralls (Bed Mobility)  supervision;verbal cues  -LF     Sit-Supine Ralls (Bed Mobility)  supervision;verbal cues  -LF     Assistive Device (Bed Mobility)  bed rails;head of bed elevated;draw sheet  -     Comment (Bed Mobility)  Pt with poor participation in PT eval.  Pt refused to pull himself up in bed, requesting to use lift, but was able to move supine<>sitting with SBA.  Anticipate pt is able to assist in pulling himself up in bed with bedrails.    -     Row Name 10/11/19 1104          Transfer Assessment/Treatment    Comment (Transfers)  Pt encouraged to participate in transfer to chair, but declined after multiple attempts.  Pt requesting lift, but stated he is able to transfer bed to [facility] at home without assist.  -     Row Name 10/11/19 1104          Bed-Chair Transfer    Bed-Chair Ralls (Transfers)  unable to assess  -     Row Name 10/11/19 1104          Sit-Stand Transfer    Sit-Stand Ralls (Transfers)  unable to assess  -     Row Name 10/11/19 1104          Gait/Stairs Assessment/Training    Ralls Level (Gait)  unable to assess  -       User Key  (r) = Recorded By, (t) = Taken By, (c) = Cosigned By    Initials Name Provider Type    Sabrina Rivers PT Physical Therapist        Obj/Interventions     Row  Name 10/11/19 1112          General ROM    GENERAL ROM COMMENTS  Right LE ROM WFL, Left hip ROM WFL, Left knee flexion WFL, Left knee extension lacking about 10 degrees.  -     Row Name 10/11/19 1112          MMT (Manual Muscle Testing)    General MMT Comments  Right hip strength 2/5, right knee and ankle strength grossly 3/5, Left hip and knee strength grossly 3-/5.  -     Row Name 10/11/19 1112          Static Sitting Balance    Level of [place] (Unsupported Sitting, Static Balance)  supervision;1 person assist  -     Sitting Position (Unsupported Sitting, Static Balance)  sitting on edge of bed  -     Time Able to Maintain Position (Unsupported Sitting, Static Balance)  more than 5 minutes  -     Row Name 10/11/19 1112          Dynamic Sitting Balance    Level of [place], Reaches Outside Midline (Sitting, Dynamic Balance)  contact guard assist;1 person assist  -     Sitting Position, Reaches Outside Midline (Sitting, Dynamic Balance)  sitting on edge of bed  -     Row Name 10/11/19 1112          Static Standing Balance    Level of [place] (Supported Standing, Static Balance)  unable to perform activity  -     Row Name 10/11/19 1112          Dynamic Standing Balance    Level of [place], Reaches Outside Midline (Standing, Dynamic Balance)  unable to perform activity  -     Row Name 10/11/19 1112          Sensory Assessment/Intervention    Sensory General Assessment  no sensation deficits identified  -       User Key  (r) = Recorded By, (t) = Taken By, (c) = Cosigned By    Initials Name Provider Type     Sabrina Castano, PT Physical Therapist        Goals/Plan     Row Name 10/11/19 1121          Bed Mobility Goal 1 (PT)    Activity/Assistive Device (Bed Mobility Goal 1, PT)  bed mobility activities, all  -LF     [place] Level/Cues Needed (Bed Mobility Goal 1, PT)  independent  -LF     Time Frame (Bed Mobility Goal 1, PT)  2 weeks  -     Progress/Outcomes (Bed  Mobility Goal 1, PT)  goal ongoing  -LF     Row Name 10/11/19 1121          Transfer Goal 1 (PT)    Activity/Assistive Device (Transfer Goal 1, PT)  sit-to-stand/stand-to-sit;bed-to-chair/chair-to-bed  -LF     [place] Level/Cues Needed (Transfer Goal 1, PT)  supervision required;1 person assist  -LF     Time Frame (Transfer Goal 1, PT)  2 weeks  -LF     Progress/Outcome (Transfer Goal 1, PT)  goal ongoing  -LF     Row Name 10/11/19 1121          Patient Education Goal (PT)    Activity (Patient Education Goal, PT)  PT POC/HEP  -LF     [place]/Cues/Accuracy (Memory Goal 2, PT)  independent;demonstrates adequately  -LF     Time Frame (Patient Education Goal, PT)  2 weeks  -LF     Progress/Outcome (Patient Education Goal, PT)  goal ongoing  -LF       User Key  (r) = Recorded By, (t) = Taken By, (c) = Cosigned By    Initials Name Provider Type    LF Sabrina Castano, PT Physical Therapist        Clinical Impression     Row Name 10/11/19 1116          Pain Assessment    Additional Documentation  Pain Scale: Numbers Pre/Post-Treatment (Group)  -LF     Row Name 10/11/19 1116          Pain Scale: Numbers Pre/Post-Treatment    Pain Scale: Numbers, Pretreatment  0/10 - no pain  -LF     Pain Scale: Numbers, Post-Treatment  0/10 - no pain  -     Row Name 10/11/19 1116          Plan of Care Review    Plan of Care Reviewed With  patient;spouse  -     Row Name 10/11/19 1116          Physical Therapy Clinical Impression    Patient/Family Goals Statement (PT Clinical Impression)   Pt reports that he would like to be able to get out of bed.  Spouse stated that she would like him to get his new prosthesis and go to inpatient rehab in order to increase strength and be able to walk again.  -LF     Criteria for Skilled Interventions Met (PT Clinical Impression)  yes;treatment indicated  -LF     Rehab Potential (PT Clinical Summary)  fair, will monitor progress closely  -LF     Predicted Duration of Therapy (PT)  2 weeks   -LF     Row Name 10/11/19 1116          Vital Signs    Pre Systolic BP Rehab  140  -LF     Pre Treatment Diastolic BP  90  -LF     Pretreatment Heart Rate (beats/min)  76  -LF     Posttreatment Heart Rate (beats/min)  78  -LF     O2 Delivery Pre Treatment  room air  -LF     O2 Delivery Intra Treatment  room air  -LF     O2 Delivery Post Treatment  room air  -LF     Pre Patient Position  Supine  -LF     Intra Patient Position  Sitting  -LF     Post Patient Position  Supine  -LF     Row Name 10/11/19 1116          Positioning and Restraints    Pre-Treatment Position  in bed  -LF     Post Treatment Position  bed  -LF     In Bed  fowlers;side rails up x2;call light within reach;encouraged to call for assist;with family/caregiver;notified nsg  -LF       User Key  (r) = Recorded By, (t) = Taken By, (c) = Cosigned By    Initials Name Provider Type    Sabrina Rivers PT Physical Therapist        Outcome Measures     Row Name 10/11/19 1129          How much help from another person do you currently need...    Turning from your back to your side while in flat bed without using bedrails?  3  -LF     Moving from lying on back to sitting on the side of a flat bed without bedrails?  3  -LF     Moving to and from a bed to a chair (including a wheelchair)?  2  -LF     Standing up from a chair using your arms (e.g., wheelchair, bedside chair)?  2  -LF     Climbing 3-5 steps with a railing?  1  -LF     To walk in hospital room?  2  -LF     AM-PAC 6 Clicks Score (PT)  13  -LF     Row Name 10/11/19 1129          Functional Assessment    Outcome Measure Options  AM-PAC 6 Clicks Basic Mobility (PT)  -LF       User Key  (r) = Recorded By, (t) = Taken By, (c) = Cosigned By    Initials Name Provider Type    Sabrina Rivers PT Physical Therapist        Physical Therapy Education     Title: PT OT SLP Therapies (In Progress)     Topic: Physical Therapy (In Progress)     Point: Mobility training (In Progress)     Learning Progress  Summary           Patient Nonacceptance, E,D, NR by  at 10/11/2019 11:23 AM   Family Nonacceptance, E,D, NR by  at 10/11/2019 11:23 AM                   Point: Home exercise program (In Progress)     Learning Progress Summary           Patient Nonacceptance, E,D, NR by  at 10/11/2019 11:23 AM   Family Nonacceptance, E,D, NR by  at 10/11/2019 11:23 AM                   Point: Body mechanics (In Progress)     Learning Progress Summary           Patient Nonacceptance, E,D, NR by  at 10/11/2019 11:23 AM   Family Nonacceptance, E,D, NR by  at 10/11/2019 11:23 AM                   Point: Precautions (In Progress)     Learning Progress Summary           Patient Nonacceptance, E,D, NR by  at 10/11/2019 11:23 AM   Family Nonacceptance, E,D, NR by  at 10/11/2019 11:23 AM                               User Key     Initials Effective Dates Name Provider Type Discipline     06/08/18 -  Sabrina Castano, PT Physical Therapist PT              PT Recommendation and Plan  Planned Therapy Interventions (PT Eval): balance training, bed mobility training, gait training, home exercise program, patient/family education, postural re-education, strengthening, ROM (range of motion), transfer training  Outcome Summary/Treatment Plan (PT)  Anticipated Discharge Disposition (PT): skilled nursing facility  Plan of Care Reviewed With: patient, spouse  Outcome Summary: PT eval complete.  Pt presents with decreased endurance, decreased strength, and decreased balance.  Pt has a history of left BKA and states that his prosthesis does not fit and he has not been able to  his new one.  Pt is depressed and required encouragement for participation in all transfers.  Pt was able to move supine <>sitting at EOB with SBA, but refused to attempt sit to stand or bed to chair transfer training with assist x3.  Pt would benefit from continued skilled PT to increase strength and activity tolerance with functional mobility.  Recommend  [facility] at [facility] upon D/C.     Time Calculation:   PT Charges     Row Name 10/11/19 0955             Time Calculation    Start Time  955  -LF      PT Received On  10/11/19  -LF      PT Goal Re-Cert Due Date  10/21/19  -        User Key  (r) = Recorded By, (t) = Taken By, (c) = Cosigned By    Initials Name Provider Type     Sabrina Castano, PT Physical Therapist        Therapy Charges for Today     Code Description Service Date Service Provider Modifiers Qty    81620003205 HC PT EVAL HIGH COMPLEXITY 4 10/11/2019 Sabrina Castano, PT GP 1          PT G-Codes  Outcome Measure Options: AM-PAC 6 Clicks Basic Mobility (PT)  AM-PAC 6 Clicks Score (PT): 13  AM-PAC 6 Clicks Score (OT): 17    Sabrina Castano PT  10/11/2019         Electronically signed by Sabrina Castano, PT at 10/11/19 1131          Occupational Therapy Notes (most recent note)      Clarita Enamorado, OT at 10/14/19 1427          Acute Care - Occupational Therapy Discharge Summary  [facility]     Patient Name: Kendrick Crystal  : 1970  MRN: [id number]    Today's Date: 10/14/2019  Onset of Illness/Injury or Date of Surgery: 10/08/19    Date of Referral to OT: 10/09/19  Referring Physician: CHINTAN Lenz      Admit Date: 10/8/2019        OT Recommendation and Plan    Visit Dx:    ICD-10-CM ICD-9-CM   1. Perinephric abscess N15.1 590.2   2. Sepsis, due to unspecified organism, unspecified whether acute organ dysfunction present (CMS/HCC) A41.9 038.9     995.91   3. Dehydration E86.0 276.51   4. Uncontrolled type 2 diabetes mellitus with hyperglycemia (CMS/HCC) E11.65 250.02   5. Essential hypertension I10 401.9   6. History of MRSA infection Z86.14 V12.04   7. Cirrhosis of liver without ascites, unspecified hepatic cirrhosis type (CMS/HCC) K74.60 571.5   8. Impaired mobility and ADLs Z74.09 799.89         Time Calculation- OT     Row Name 10/14/19 1422             Time Calculation- OT    OT Start Time  1422  -AC      OT Received On   10/14/19  -        User Key  (r) = Recorded By, (t) = Taken By, (c) = Cosigned By    Initials Name Provider Type     Clarita Enamorado, OT Occupational Therapist                        OT Discharge Summary  Anticipated Discharge Disposition (OT): home with assist  Reason for Discharge: other (comment)(Pt requested to be discharged from OT)  Discharge Destination: Home with assist      Clarita Enamorado OT  10/14/2019     Electronically signed by Clarita Enamorado, OT at 10/14/19 2504

## 2019-10-23 NOTE — DISCHARGE SUMMARY
[facility] Medicine Services  DISCHARGE SUMMARY    Patient Name: Kendrick Crystal  : 1970  MRN: [id number]    Date of Admission: 10/8/2019  Date of Discharge:  10/23/2019  Primary Care Physician: No primary care provider on file.    Consults     Date and Time Order Name Status Description    10/9/2019 1221 Inpatient Colorectal Surgery Consult Completed     10/9/2019 1052 Inpatient Infectious Diseases Consult Completed     10/9/2019 0121 Inpatient Urology Consult            Hospital Course     Presenting Problem:   Perinephric abscess [N15.1]    Active Hospital Problems    Diagnosis  POA   • **Perinephric abscess [N15.1]  Yes   • Bacteremia due to Klebsiella pneumoniae [R78.81]  Unknown   • Acute UTI (urinary tract infection) [N39.0]  Yes   • Gastroparesis [K31.84]  Yes   • S/P BKA (below knee amputation), left (CMS/HCC) [Z89.512]  Not Applicable   • Type 2 diabetes mellitus with circulatory disorder and uncontrolled [E11.59]  Yes   • DUNLAP Cirrhosis [K74.60]  Yes   • Essential hypertension [I10]  Yes   • Hyperlipemia [E78.5]  Yes   • Non-compliance [Z91.14]  Not Applicable      Resolved Hospital Problems   No resolved problems to display.          Hospital Course:  Kendrick Crystal is a 49 y.o. male history of diabetes, hypertension, previous skin infections with MRSA, [place] cirrhosis, peripheral vascular disease status post left below the knee amputation who presents with sepsis due to renal and perinephric abscess status post drainage by interventional radiology with positive cultures of Klebsiella pneumoniae.  Urinary tract infection with Klebsiella and E. coli.  Klebsiella bacteremia.     Sepsis secondary to Klebsiella perinephric abscess, Klebsiella and E. coli urinary tract infection, Klebsiella bacteremia:  -Urology has completed cystoscopy, no appearance of colovesical fistula.  With leukocyte count improving they recommend to hold off on further invasive drainage at this  time.  -Clinically responding to antibiotics and leukocytosis slowly trending down.  -Switched from Zosyn to Invanz 1 gm daily IV for 10 days (first dose today)  -follow up with Urology per their recommendation  -follow up with Dr. Dong on Friday in the clinic  -patient insisting on home IV antibiotic, per Dr. Dong patient is to be discharged home with daily Invanz 1 gm daily IV per home health, continue oral vancomycin.        Urine retention  --initially had childs placed by Dr Gutierres on admission/ c/o pneumaturia   --cystoscopy showed no anatomical abnormality, and no colovesical fistula that was suspected   --started bladder training yesterday with childs removed on 10/22/19  --patient voiding without difficulty     History of C. difficile colitis with intermittently loose stool:  -Continuing prophylactic oral vancomycin.  No current diarrhea reported.  --has follow up with ID on Friday in clinic     Essential hypertension:  --Blood pressure mildly elevated.  --Continue current regimen and monitor at home.  --Avoid hypotension in the setting of infection.     Type 2 diabetes mellitus with diabetic peripheral polyneuropathy:    --A1C 14.1  --Severely uncontrolled on outpatient basis.    --Glucose appears to be well controlled here and not requiring sliding scale coverage  --will discharge home on current regimen  --Monitor glucose at home and defer further medication changes to PCP.     Weakness/debility/below the knee amputation:  Patient refuses therapy and refuses placement at discharge.  He has a power chair at the bedside.  He reports his current prosthesis does not fit and reports he is to receive a new one soon.      [place] cirrhosis: Currently appears reasonably compensated.  Monitor.  No encephalopathy     Gastroparesis: Tolerating diet.  Should improve with improved glucose control.      Disposition: To be discharged home today with [facility]    CODE STATUS:       Code Status and Medical  Interventions:           Discharge Follow Up Recommendations for labs/diagnostics:  Follow up with Dr. Dong on Friday, 10/25/19 in his clinic  Follow up with Dr. Payne per his recommendations  Follow up with Dr. Gutierres, Urology per his recommendations      Day of Discharge     HPI:   Patient is awake resting comfortably in bed, family at bedside.  Slept well with no overnight issues.  No fever, chills, soa, cp, n/v/d, or any other complaints at this time.  Reports that he is feeling better and wants to go home.    Review of Systems  Gen- No fevers, chills  CV- No chest pain, palpitations  Resp- No cough, dyspnea  GI- No N/V/D, abd pain       Otherwise ROS is negative except as mentioned in the HPI.    Vital Signs:    Temp:  [97.8 °F (36.6 °C)-99.8 °F (37.7 °C)] 97.8 °F (36.6 °C)  Heart Rate:  [76-92] 76  Resp:  [18] 18  BP: (134-175)/(84-97) 175/97     Physical Exam:  Constitutional: Awake, alert, resting in bed, family at bedside  Eyes: PERRLA, sclerae anicteric, no conjunctival injection  HENT: NCAT, mucous membranes moist  Neck: Supple, no thyromegaly, no lymphadenopathy, trachea midline  Respiratory: Clear to auscultation bilaterally, nonlabored respirations   Cardiovascular: RRR, no murmurs, rubs, or gallops, palpable pedal pulses bilaterally  Gastrointestinal: Positive bowel sounds, soft, nontender, nondistended  Musculoskeletal: Mild right ankle edema, no clubbing or cyanosis to extremities, LBKA  Psychiatric: Appropriate affect, cooperative  Neurologic: Oriented x 3, strength symmetric in all extremities, Cranial Nerves grossly intact to confrontation, speech clear  Skin: No rashes, right lateral ankle ulceration, no drainage or surrounding erythema  PICC RUE       Pertinent  and/or Most Recent Results     Results from last 7 days   Lab Units 10/22/19  0514 10/21/19  0634 10/20/19  0536 10/19/19  0749 10/18/19  0953 10/17/19  0545   WBC 10*3/mm3 15.49* 15.94* 16.90* 18.75* 13.12* 15.48*   HEMOGLOBIN g/dL  9.1* 9.1* 8.4* 9.1* 8.9* 8.9*   HEMATOCRIT % 29.3* 28.9* 27.7* 29.1* 28.6* 28.8*   PLATELETS 10*3/mm3 369 375 338 352 358 339   SODIUM mmol/L 141 140 138 139 137  --    POTASSIUM mmol/L 3.8 3.8 3.9 3.7 3.6  --    CHLORIDE mmol/L 103 104 107 110* 109*  --    CO2 mmol/L 29.0 28.0 24.0 20.0* 21.0*  --    BUN mg/dL 11 8 11 14 14  --    CREATININE mg/dL 0.47* 0.45* 0.47* 0.50* 0.51*  --    GLUCOSE mg/dL 129* 106* 199* 96 147*  --    CALCIUM mg/dL 8.2* 8.2* 7.7* 7.9* 7.5*  --      Results from last 7 days   Lab Units 10/21/19  0634 10/20/19  0536 10/19/19  0749 10/18/19  0953   BILIRUBIN mg/dL <0.2* <0.2* <0.2* <0.2*   ALK PHOS U/L 246* 197* 211* 230*   ALT (SGPT) U/L 15 13 13 16   AST (SGOT) U/L 19 17 13 20           Invalid input(s): TG, LDLCALC, LDLREALC        Brief Urine Lab Results  (Last result in the past 365 days)      Color   Clarity   Blood   Leuk Est   Nitrite   Protein   CREAT   Urine HCG        10/08/19 2256 Yellow Cloudy Moderate (2+) Small (1+) Positive Negative               Microbiology Results Abnormal     Procedure Component Value - Date/Time    Fungus Culture - Body Fluid, Peritoneum [[id number]] Collected:  10/09/19 1508    Lab Status:  Preliminary result Specimen:  Body Fluid from Peritoneum Updated:  10/23/19 1703     Fungus Culture No fungus isolated at 2 weeks    Urine Culture - Urine, Urine, Clean Catch [[id number]]  (Abnormal)  (Susceptibility) Collected:  10/08/19 2256    Lab Status:  Edited Result - FINAL Specimen:  Urine, Clean Catch Updated:  10/23/19 1215     Urine Culture >100,000 CFU/mL Klebsiella pneumoniae ssp pneumoniae      >100,000 CFU/mL Escherichia coli    Susceptibility      Klebsiella pneumoniae ssp pneumoniae     EYAD     Ampicillin Resistant     Ampicillin + Sulbactam Susceptible     Cefazolin Susceptible     Cefepime Susceptible     Ceftazidime Susceptible     Ceftriaxone Susceptible     Ertapenem Susceptible [1]      Gentamicin Susceptible     Levofloxacin Susceptible      Meropenem Susceptible [1]      Nitrofurantoin Intermediate     Piperacillin + Tazobactam Susceptible     Tetracycline Susceptible     Trimethoprim + Sulfamethoxazole Susceptible            [1]   Appended report. These results have been appended to a previously final verified report.             Susceptibility      Escherichia coli     EYAD     Ampicillin Resistant     Ampicillin + Sulbactam Intermediate     Cefazolin Susceptible     Cefepime Susceptible     Ceftazidime Susceptible     Ceftriaxone Susceptible     Ertapenem Susceptible [1]      Gentamicin Susceptible     Levofloxacin Resistant     Meropenem Susceptible [1]      Nitrofurantoin Susceptible     Piperacillin + Tazobactam Susceptible     Tetracycline Susceptible     Trimethoprim + Sulfamethoxazole Susceptible            [1]   Appended report. These results have been appended to a previously final verified report.             Susceptibility Comments     Klebsiella pneumoniae ssp pneumoniae    Dr. Dong requests ertapenem and meropenem.    Escherichia coli    Dr. dong requests ertapenem and meropenem.             Body Fluid Culture - Body Fluid, Peritoneum [[id number]]  (Abnormal)  (Susceptibility) Collected:  10/09/19 1508    Lab Status:  Edited Result - FINAL Specimen:  Body Fluid from Peritoneum Updated:  10/23/19 1213     Body Fluid Culture Moderate growth (3+) Klebsiella pneumoniae ssp pneumoniae     Gram Stain Many (4+) WBCs per low power field      Moderate (3+) Gram negative bacilli    Susceptibility      Klebsiella pneumoniae ssp pneumoniae     EYAD     Ampicillin Resistant     Ampicillin + Sulbactam Susceptible     Cefazolin Susceptible     Cefepime Susceptible     Ceftazidime Susceptible     Ceftriaxone Susceptible     Ertapenem Susceptible [1]      Gentamicin Susceptible     Levofloxacin Susceptible     Meropenem Susceptible [1]      Piperacillin + Tazobactam Susceptible     Trimethoprim + Sulfamethoxazole Susceptible            [1]   Appended  report. These results have been appended to a previously final verified report.             Susceptibility Comments     Klebsiella pneumoniae ssp pneumoniae    Dr. Dong requests ertapenem and meropenem             Anaerobic Culture - Body Fluid, Peritoneum [[id number]] Collected:  10/09/19 1508    Lab Status:  Final result Specimen:  Body Fluid from Peritoneum Updated:  10/14/19 0935     Anaerobic Culture No anaerobes isolated at 5 days    Blood Culture - Blood, Arm, Right [[id number]]  (Abnormal) Collected:  10/08/19 2012    Lab Status:  Final result Specimen:  Blood from Arm, Right Updated:  10/10/19 2122     Blood Culture Klebsiella pneumoniae ssp pneumoniae     Isolated from Aerobic and Anaerobic Bottles     Gram Stain Aerobic Bottle Gram negative bacilli      Anaerobic Bottle Gram negative bacilli    Narrative:       Refer to blood culture collected 10/8/2019 at 2000 for MICs.    Blood Culture - Blood, Arm, Left [[id number]]  (Abnormal)  (Susceptibility) Collected:  10/08/19 2000    Lab Status:  Final result Specimen:  Blood from Arm, Left Updated:  10/10/19 2122     Blood Culture Klebsiella pneumoniae ssp pneumoniae     Isolated from Aerobic and Anaerobic Bottles     Gram Stain Anaerobic Bottle Gram negative bacilli      Aerobic Bottle Gram negative bacilli    Susceptibility      Klebsiella pneumoniae ssp pneumoniae     EYAD     Ampicillin Resistant     Ampicillin + Sulbactam Susceptible     Cefazolin Susceptible     Cefepime Susceptible     Ceftazidime Susceptible     Ceftriaxone Susceptible     Gentamicin Susceptible     Levofloxacin Susceptible     Piperacillin + Tazobactam Susceptible     Trimethoprim + Sulfamethoxazole Susceptible                    Blood Culture ID, PCR - Blood, Arm, Left [[id number]]  (Abnormal) Collected:  10/08/19 2000    Lab Status:  Final result Specimen:  Blood from Arm, Left Updated:  10/09/19 1038     BCID, PCR Klebsiella pneumoniae. Identification by BCID PCR.          Imaging  Results (all)     Procedure Component Value Units Date/Time    CT Abdomen Pelvis With Contrast [[id number]] Collected:  10/18/19 1452     Updated:  10/18/19 1730    Narrative:       EXAMINATION: CT ABDOMEN AND PELVIS WITH CONTRAST-10/18/2019:      INDICATION: Reassess perinephric abscess and rule out other  intraabdominal pathology; N15.1-Renal and perinephric abscess;  A41.9-Sepsis, unspecified organism; E86.0-Dehydration; E11.65-Type 2  diabetes mellitus with hyperglycemia; I10-Essential (primary)  hypertension; Z86.14-Personal history of Methicillin resistant  Staphylococcus aureus infection; K74.60-Unspecified cirrhosis of liver;  Z74.09-Other reduced m.      TECHNIQUE: CT abdomen and pelvis with intravenous contrast.     The radiation dose reduction device was turned on for each scan per the  ALARA (As Low as Reasonably Achievable) protocol.     COMPARISON: CT dated 10/09/2019.     FINDINGS: The lung bases demonstrate numerous nodular densities within  the bilateral lung bases some of which are calcified, however, the  majority of which are noncalcified measuring up to 13 mm right lower  lobe medial segment which has central area of potential lucency and  developing cavitation along with a separate site in the more superior  right lower lobe raising suspicion for infectious or inflammatory  process along with trace bilateral pleural effusions, right greater than  left.     Liver without focal lesion. Gallbladder unremarkable. No biliary  dilatation. Pancreas and spleen unremarkable. Adrenals demonstrate  stable left adrenal nodule. Kidneys without hydronephrosis or  hydroureter demonstrating a grossly stable appearing fluid collection of  likely subcapsular/perinephric location inferior pole right kidney  without significant change from prior. No gas containing within this  fluid collection or new soft tissue enhancement/nodularity. GI tract  without focal thickening or disproportionate dilatation of bowel. No  new  focal fluid collection or abscess is evident. Pelvic viscera  unremarkable. Degenerative disease of the spine without aggressive  osseous lesion. Mild body wall edema.       Impression:       1.  Grossly stable appearance of perinephric/subcapsular fluid  associated with the right kidney without evidence for progressive  nodularity/enhancement or gas contained within this collection from  prior comparison.  2.  No new focal fluid collection or loculated fluid collection within  the abdomen.  3.  The lung bases demonstrate numerous bilateral pulmonary nodules  several of which were present on prior examinations including largest  medial segment right lower lobe with now trace bilateral pleural  effusions. Some of these have foci of central lucency concerning for  cavitary components of infectious or inflammatory process.     D:  10/18/2019  E:  10/18/2019     This report was finalized on 10/18/2019 5:27 PM by Dr. Jb Campos.       CT Guided Abscess Drain Retroperitoneal [[id number]] Collected:  10/09/19 1611     Updated:  10/09/19 1715    Narrative:       EXAMINATION: CT GUIDED ABSCESS DRAIN RETROPERITONEAL- 10/09/2019     INDICATION: N15.1-Renal and perinephric abscess; A41.9-Sepsis,  unspecified organism; E86.0-Dehydration; E11.65-Type 2 diabetes mellitus  with hyperglycemia; I10-Essential (primary) hypertension;  Z86.14-Personal history of Methicillin resistant Staphylococcus aureus  infection; K74.60-Unspecified cirrhosis of liver; fluid collection     TECHNIQUE: CT-guided drainage of a perinephric fluid collection     The radiation dose reduction device was turned on for each scan per the  ALARA (As Low as Reasonably Achievable) protocol.     COMPARISON: NONE     FINDINGS: Patient referred for CT-guided drainage of a perinephric fluid  collection. Procedure and risks were explained to the patient. Informed  consent was obtained and signed. Patient placed in the left lateral  position upon the table. The  right flank was prepped and draped in a  sterile fashion. 1% lidocaine used as a local anesthetic. Under CT  fluoroscopic guidance an 18-gauge 15 cm Chiba needle was advanced into  the perinephric fluid collection of approximately 10 ml of a reddish  fluid was removed with some debris within the fluid. There is a striated  delayed nephrogram seen of the right kidney suggesting possible  pyelonephritis with surrounding stranding of the perinephric fat.  Myelolipoma seen on the adrenal gland on the left. Chiba needle was  removed and no immediate complication occurred. Slight decrease seen in  size of the perinephric fluid collection status post drainage.       Impression:       CT-guided drainage of a perinephric fluid collection with no  immediate complication.     D:  10/09/2019  E:  10/09/2019     This report was finalized on 10/9/2019 5:12 PM by Dr. Nella nEriquez MD.       CT Abdomen Pelvis With Contrast [[id number]] Collected:  10/08/19 2202     Updated:  10/08/19 2222    Narrative:       CT ABDOMEN AND PELVIS WITH CONTRAST, 10/8/2019    HISTORY:  49-year-old male in the ED complaining of 2 day history right side abdomen pain, nausea, vomiting and generalized weakness. History of hepatic cirrhosis (DUNLAP). History of diabetes with left BKA and multiple abdominal wall abscesses and lower extremity  cellulitis in 2018.    TECHNIQUE:  CT imaging of the abdomen and pelvis with IV contrast. Radiation dose reduction techniques included automated exposure control. Radiation audit for CT and nuclear cardiology exams in the last 12 months: 7.    COMPARISON:  *  CT abdomen/pelvis, 8/18/2019.    ABDOMEN FINDINGS:  Images show a right lower perinephric fluid collection that is either subcapsular or pericapsular. This extends along the lower kidney for a length of about 8.5 cm and has a maximum thickness of 2.5 cm. The fluid is mixed attenuation and may be  hemorrhagic. Correlate for any recent history of blunt trauma to  the flank. Renal parenchyma shows no disruption or abnormal enhancement. There is no fracture of overlying right lower posterior ribs or transverse spinous processes. Given the history,  perinephric abscess is possible but is significantly less likely given normal renal parenchymal enhancement. No evidence of urinary obstruction. No visible nephrolithiasis.    Advanced hepatic cirrhosis. Mild splenomegaly. No suspicious liver lesion. No upper abdominal ascites. Hepatic vasculature appears patent. No gallbladder distention or bile duct dilatation. Negative pancreas.    Benign left adrenal myelolipoma measuring about 2.9 cm. Normal caliber abdominal aorta. Small bowel and colon are normal in caliber and appearance, as imaged. The appendix is normal. No gastric distention or distal esophageal dilatation.    PELVIS FINDINGS:  Air within the urinary bladder, likely iatrogenic. There is at least mild diffuse bladder wall thickening. Prostate and rectum are unremarkable. No inguinal hernia.    Lung base images show multiple benign calcified granulomas.      Impression:       1.  Small mixed attenuation right perinephric fluid collection along the inferior capsule, not present on the prior exam. Perinephric hematoma is a consideration. Correlate clinically for any history of blunt flank trauma. While abscess is possible, this  is unlikely as right renal parenchyma enhances normally with no evidence of pyelonephritis. Laboratory correlation recommended.  2.  Advanced hepatic cirrhosis. Splenomegaly.  3.  Urinary bladder wall thickening. Gas within the bladder is most likely iatrogenic.  4.  Benign left adrenal myelolipoma.    Signer Name: Fadi Healy MD   Signed: 10/8/2019 10:02 PM   Workstation Name: [email or web address]    [facility]    XR Chest PA & Lateral [[id number]] Collected:  10/08/19 2210     Updated:  10/08/19 2212    Narrative:       CHEST X-RAY, 10/8/2019      HISTORY:    49-year-old male  in the ED with reported diabetic ketoacidosis.      TECHNIQUE:  AP portable upright chest series.      FINDINGS:  Heart size and pulmonary vascularity are normal. The lungs are expanded and clear. No visible pulmonary infiltrate or pleural effusion. Lower lung benign calcified granulomas.      Impression:       Negative chest.    Signer Name: Fadi Healy MD   Signed: 10/8/2019 10:10 PM   [email or web address]    [facility]          Results for orders placed during the hospital encounter of 10/08/19   Duplex Venous Lower Extremity - Right CAR    Narrative · Normal right lower extremity venous duplex scan.          Results for orders placed during the hospital encounter of 10/08/19   Duplex Venous Lower Extremity - Right CAR    Narrative · Normal right lower extremity venous duplex scan.          Results for orders placed during the hospital encounter of 05/18/19   Adult Transthoracic Echo Complete W/ Cont if Necessary Per Protocol    Narrative · Estimated EF = 60%.  · Mild mitral valve regurgitation is present  · Mild tricuspid valve regurgitation is present.  · Calculated right ventricular systolic pressure from tricuspid   regurgitation is 29 mmHg.           Order Current Status    Fungus Culture - Body Fluid, Peritoneum Preliminary result        Discharge Details        Discharge Medications      New Medications      Instructions Start Date   ertapenem 1 gm/100ml solution IV  Commonly known as:  INVanz   1 g, Intravenous, Every 24 Hours Scheduled   Start Date:  10/24/2019     saccharomyces boulardii 250 MG capsule  Commonly known as:  FLORASTOR   250 mg, Oral, 2 Times Daily      vancomycin 50 MG/ML reconstituted solution oral solution reconstituted   125 mg, Oral, Every 6 Hours Scheduled      witch hazel-glycerin pad  Commonly known as:  TUCKS   Topical, As Needed, OTC         Changes to Medications      Instructions Start Date   LANTUS 100 UNIT/ML injection  Generic drug:   insulin glargine  What changed:  how much to take   20 Units, Subcutaneous, 2 Times Daily         Continue These Medications      Instructions Start Date   amLODIPine 10 MG tablet  Commonly known as:  NORVASC   10 mg, Oral, Every 24 Hours Scheduled      aspirin 81 MG tablet   81 mg, Oral, Daily      DULoxetine 30 MG capsule  Commonly known as:  CYMBALTA   30 mg, Oral, Daily      insulin lispro 100 UNIT/ML injection  Commonly known as:  humaLOG   5 Units, Subcutaneous, 4 Times Daily With Meals & Nightly, Plus sliding scale      metoprolol tartrate 100 MG tablet  Commonly known as:  LOPRESSOR   100 mg, Oral, Every 12 Hours      nystatin 950506 UNIT/GM cream  Commonly known as:  MYCOSTATIN   Topical, 2 Times Daily      sevelamer 0.8 g pack packet  Commonly known as:  RENVELA   800 mg, Oral, 3 Times Daily With Meals      terazosin 5 MG capsule  Commonly known as:  HYTRIN   5 mg, Oral, Nightly         Stop These Medications    hydrALAZINE 50 MG tablet  Commonly known as:  APRESOLINE     HYDROcodone-acetaminophen  MG per tablet  Commonly known as:  NORCO     oxyCODONE 10 MG tablet  Commonly known as:  ROXICODONE     promethazine 12.5 MG tablet  Commonly known as:  PHENERGAN     tamsulosin 0.4 MG capsule 24 hr capsule  Commonly known as:  FLOMAX            No Known Allergies      Discharge Disposition:      Diet:  Hospital:  Diet Order   Procedures   • Diet Regular; Consistent Carbohydrate     Discharge:   Diet Instructions     Diet: Consistent Carbohydrate, Regular      Discharge Diet:   Consistent Carbohydrate  Regular             Discharge Activity:   Activity Instructions     Activity as Tolerated              CODE STATUS:    Code Status and Medical Interventions:   Ordered at: 10/08/19 6601     Level Of Support Discussed With:    Patient     Code Status:    CPR     Medical Interventions (Level of Support Prior to Arrest):    Full         Future Appointments   Date Time Provider Department Center   10/29/2019   9:00 AM Karri Greenfield APRN [facility] None       Additional Instructions for the Follow-ups that You Need to Schedule     Ambulatory Referral to Home Health   As directed      CBC/CMP every Monday  PICC line dressing care every Monday    Order Comments:  CBC/CMP every Monday PICC line dressing care every Monday     Face to Face Visit Date:  10/23/2019    Follow-up provider for Plan of Care?:  I treated the patient in an acute care facility and will not continue treatment after discharge.    Follow-up provider:  YODIT CHAMORRO [[id number]]    Reason/Clinical Findings:  right ankle wound, perinephric abscess, perirectal abscess,    Describe mobility limitations that make leaving home difficult:  left below the knee amputation, debility, weakness    Nursing/Therapeutic Services Requested:  Skilled Nursing Physical Therapy Occupational Therapy    Skilled nursing orders:  PICC line care/instruction Wound care dressing/changes Medication education    Instructions:  Monitor right lateral ankle wound, Hydrofera blue every other day to right lateral ankle    PT orders:  Transfer training Strengthening Therapeutic exercise    Occupational orders:  Activities of daily living Strengthening Home safety assessment    Frequency:  Other         Discharge Follow-up with PCP   As directed       Currently Documented PCP:    No primary care provider on file.    PCP Phone Number:    None     Follow Up Details:  1 week         Discharge Follow-up with Specified Provider: follow up with Dr. Payne per his recommendation   As directed      To:  follow up with Dr. Payne per his recommendation         Discharge Follow-up with Specified Provider: on Friday with Dr. Dong in clinic   As directed      To:  on Friday with Dr. Dong in clinic         Discharge Follow-up with Specified Provider: with Urology per their recommendation   As directed      To:  with Urology per their recommendation               Time Spent on Discharge:  45  minutes    Electronically signed by Edwige Melgar, CHINTAN, 10/23/19, 1:47 PM.

## 2019-10-23 NOTE — PROGRESS NOTES
Clinical Nutrition   Reason For Visit: Follow-up protocol    Patient Name: Kendrick Crystal  YOB: 1970  MRN: [id number]  Date of Encounter: 10/23/19 9:37 AM  Admission date: 10/8/2019      Nutrition Assessment     Admission Problem List:  Nausea  Dry heaves  Abdominal pain  Diarrhea  UTI  Sepsis  Right perinephritic abscess (nearly resolved)  Hx of C. Diff with intermittent stools  Urinary retention  Small ulceration on right ankle      Applicable Nutrition-Related Information:  Left BKA (2017)  I&D of abdominal wall abscess (4/26/19)  Excision and drainage of perirectal abscess (8/2/19)      Applicable tests/procedures during this admission:  (10/9) s/p claudia-renal fluid collection drained - 10 mL  (10/16) s/p cystoscopy - bullous edema of bladder floor        PMH: He  has a past medical history of Abdominal wall cellulitis (5/20/2019), JUAN (acute kidney injury) (CMS/HCC) (7/29/2018), Arthritis, Bipolar 1 disorder (CMS/HCC), Cellulitis of right anterior lower leg (07/29/2018), Cirrhosis (CMS/HCC), Counseling for insulin pump, Depression, Diabetes mellitus (CMS/HCC), Encephalopathy, hepatic (CMS/HCC), H/O degenerative disc disease, History of Lissa's gangrene, Hyperlipemia, Hypertension, multiple Skin abscesses, DUNLAP, bx showed stage IV fibrosis, Neuropathy, Peripheral vascular disease (CMS/HCC), and Thrombophlebitis.   PSxH: He  has a past surgical history that includes Testicle surgery (Right); Leg Surgery; Finger amputation (Left, 1/30/2017); Leg amputation, lower tibia/fibula (Left, 3/2/2017); Esophagogastroduodenoscopy; Tonsillectomy (1975); Abdominal Wall Abscess Incision and Drainage (N/A, 4/26/2019); Hemorrhoid surgery (N/A, 8/2/2019); and Cystoscopy (N/A, 10/16/2019).        Reported/Observed/Food/Nutrition Related History       Anthropometrics   Height: 75 in  Weight: 246 lbs (bedscale weight 10/11 per nsg doc)  BMI: N/A (left BKA)  UBW: 230 lbs (8/17/19 bedscale wt per EMR)      Labs  reviewed   Labs reviewed: Yes    Medications reviewed   Medications reviewed: Yes  Pertinent: insulin, sevelamer, zosyn, probiotic, vancomycin    Current Nutrition Prescription   PO: Diet Regular; Consistent Carbohydrate   Oral Nutrition Supplement: Boost Glucose Control 3x daily    Evaluated Nutrient/Fluid Intake: 81% / 9 meals      Nutrition Diagnosis     10/9  Problem Food and nutrition knowledge deficit   Etiology Unwilling to apply information   Signs/Symptoms HgbA1c = 14.1 (10/8); patient states he eats whatever he wants and takes his insulin only when he remembers   Status: unresolved - patient refused education and written materials on 10/9    10/15 (updated 10/18)  Problem Inadequate oral intake   Etiology Decreased appetite   Signs/Symptoms PO intake: 40% / 10 meals   Status: resolved 10/23    Intervention   Intervention: Follow treatment progress, Care plan reviewed     Goal:   General: Nutrition support treatment   PO: Maintain intake    Monitoring/Evaluation:     Monitoring/Evaluation: Per protocol, PO intake, Supplement intake, Weight, Skin status  Will Continue to follow per protocol  Mary Cao, AMANDA  Time Spent: 15 min

## 2019-10-23 NOTE — DISCHARGE PLACEMENT REQUEST
"From Barb Batista -[phone]    Invanz infused 10/23/19 @ 1400    Elliot Crystal (49 y.o. Male)     Date of Birth Social Security Number Address Home Phone MRN    1970  [address] [place] [address] [address] [id number] [phone] [id number]    Taoist Marital Status          Voodoo        Admission Date Admission Type Admitting Provider Attending Provider Department, Room/Bed    10/8/19 Emergency Andrei Vora MD Furlow, Stephen Matthew, MD [facility] 5G, S552/1    Discharge Date Discharge Disposition Discharge Destination         [place] [facility] [place]              Attending Provider:  Andrei Vora MD    Allergies:  No Known Allergies    Isolation:  None   Infection:  MRSA (03/03/17)   Code Status:  CPR    Ht:  190.5 cm (75\")   Wt:  112 kg (246 lb)    Admission Cmt:  None   Principal Problem:  Perinephric abscess [N15.1]                 Active Insurance as of 10/8/2019     Primary Coverage     Payor Plan Insurance Group Employer/Plan Group    MEDICARE MEDICARE A & B      Payor Plan Address Payor Plan Phone Number Payor Plan Fax Number Effective Dates    [address] [phone]  6/1/2017 - None Entered    [facility] [address]       Subscriber Name Subscriber Birth Date Member ID       ELLIOT CRYSTAL 1970 [id number]           Secondary Coverage     Payor Plan Insurance Group Employer/Plan Group    [place]      Payor Plan Address Payor Plan Phone Number Payor Plan Fax Number Effective Dates    [address] [phone]  10/3/2017 - None Entered    [place] [address]       Subscriber Name Subscriber Birth Date Member ID       ELLIOT CRYSTAL 1970 [id number]                 Emergency Contacts      (Rel.) Home Phone Work Phone Mobile Phone    Cindy Crystal (Spouse) [phone] -- [phone]                 Discharge Summary      Edwige Melgar, APRN at 10/23/19 1346              [facility] " Hospital Medicine Services  DISCHARGE SUMMARY    Patient Name: Kendrick Crystal  : 1970  MRN: [id number]    Date of Admission: 10/8/2019  Date of Discharge:  10/23/2019  Primary Care Physician: No primary care provider on file.    Consults     Date and Time Order Name Status Description    10/9/2019 1221 Inpatient Colorectal Surgery Consult Completed     10/9/2019 1052 Inpatient Infectious Diseases Consult Completed     10/9/2019 0121 Inpatient Urology Consult            Hospital Course     Presenting Problem:   Perinephric abscess [N15.1]    Active Hospital Problems    Diagnosis  POA   • **Perinephric abscess [N15.1]  Yes   • Bacteremia due to Klebsiella pneumoniae [R78.81]  Unknown   • Acute UTI (urinary tract infection) [N39.0]  Yes   • Gastroparesis [K31.84]  Yes   • S/P BKA (below knee amputation), left (CMS/HCC) [Z89.512]  Not Applicable   • Type 2 diabetes mellitus with circulatory disorder and uncontrolled [E11.59]  Yes   • DUNLAP Cirrhosis [K74.60]  Yes   • Essential hypertension [I10]  Yes   • Hyperlipemia [E78.5]  Yes   • Non-compliance [Z91.14]  Not Applicable      Resolved Hospital Problems   No resolved problems to display.          Hospital Course:  Kendrick Crystal is a 49 y.o. male history of diabetes, hypertension, previous skin infections with MRSA, Dunlap cirrhosis, peripheral vascular disease status post left below the knee amputation who presents with sepsis due to renal and perinephric abscess status post drainage by interventional radiology with positive cultures of Klebsiella pneumoniae.  Urinary tract infection with Klebsiella and E. coli.  Klebsiella bacteremia.     Sepsis secondary to Klebsiella perinephric abscess, Klebsiella and E. coli urinary tract infection, Klebsiella bacteremia:  -Urology has completed cystoscopy, no appearance of colovesical fistula.  With leukocyte count improving they recommend to hold off on further invasive drainage at this time.  -Clinically responding to  antibiotics and leukocytosis slowly trending down.  -Switched from Zosyn to Invanz 1 gm daily IV for 10 days (first dose today)  -follow up with Urology per their recommendation  -follow up with Dr. Dong on Friday in the clinic  -patient insisting on home IV antibiotic, per Dr. Dong patient is to be discharged home with daily Invanz 1 gm daily IV per home health, continue oral vancomycin.        Urine retention  --initially had childs placed by Dr Gutierres on admission/ c/o pneumaturia   --cystoscopy showed no anatomical abnormality, and no colovesical fistula that was suspected   --started bladder training yesterday with childs removed on 10/22/19  --patient voiding without difficulty     History of C. difficile colitis with intermittently loose stool:  -Continuing prophylactic oral vancomycin.  No current diarrhea reported.  --has follow up with ID on Friday in clinic     Essential hypertension:  --Blood pressure mildly elevated.  --Continue current regimen and monitor at home.  --Avoid hypotension in the setting of infection.     Type 2 diabetes mellitus with diabetic peripheral polyneuropathy:    --A1C 14.1  --Severely uncontrolled on outpatient basis.    --Glucose appears to be well controlled here and not requiring sliding scale coverage  --will discharge home on current regimen  --Monitor glucose at home and defer further medication changes to PCP.     Weakness/debility/below the knee amputation:  Patient refuses therapy and refuses placement at discharge.  He has a power chair at the bedside.  He reports his current prosthesis does not fit and reports he is to receive a new one soon.      Johnston cirrhosis: Currently appears reasonably compensated.  Monitor.  No encephalopathy     Gastroparesis: Tolerating diet.  Should improve with improved glucose control.      Disposition: To be discharged home today with [facility]    CODE STATUS:       Code Status and Medical Interventions:           Discharge  Follow Up Recommendations for labs/diagnostics:  Follow up with Dr. Dong on Friday, 10/25/19 in his clinic  Follow up with Dr. Payne per his recommendations  Follow up with Dr. Gutierres, Urology per his recommendations      Day of Discharge     HPI:   Patient is awake resting comfortably in bed, family at bedside.  Slept well with no overnight issues.  No fever, chills, soa, cp, n/v/d, or any other complaints at this time.  Reports that he is feeling better and wants to go home.    Review of Systems  Gen- No fevers, chills  CV- No chest pain, palpitations  Resp- No cough, dyspnea  GI- No N/V/D, abd pain       Otherwise ROS is negative except as mentioned in the HPI.    Vital Signs:    Temp:  [97.8 °F (36.6 °C)-99.8 °F (37.7 °C)] 97.8 °F (36.6 °C)  Heart Rate:  [76-92] 76  Resp:  [18] 18  BP: (134-175)/(84-97) 175/97     Physical Exam:  Constitutional: Awake, alert, resting in bed, family at bedside  Eyes: PERRLA, sclerae anicteric, no conjunctival injection  HENT: NCAT, mucous membranes moist  Neck: Supple, no thyromegaly, no lymphadenopathy, trachea midline  Respiratory: Clear to auscultation bilaterally, nonlabored respirations   Cardiovascular: RRR, no murmurs, rubs, or gallops, palpable pedal pulses bilaterally  Gastrointestinal: Positive bowel sounds, soft, nontender, nondistended  Musculoskeletal: Mild right ankle edema, no clubbing or cyanosis to extremities, LBKA  Psychiatric: Appropriate affect, cooperative  Neurologic: Oriented x 3, strength symmetric in all extremities, Cranial Nerves grossly intact to confrontation, speech clear  Skin: No rashes, right lateral ankle ulceration, no drainage or surrounding erythema  PICC RUE       Pertinent  and/or Most Recent Results     Results from last 7 days   Lab Units 10/22/19  0514 10/21/19  0634 10/20/19  0536 10/19/19  0749 10/18/19  0953 10/17/19  0545   WBC 10*3/mm3 15.49* 15.94* 16.90* 18.75* 13.12* 15.48*   HEMOGLOBIN g/dL 9.1* 9.1* 8.4* 9.1* 8.9* 8.9*    HEMATOCRIT % 29.3* 28.9* 27.7* 29.1* 28.6* 28.8*   PLATELETS 10*3/mm3 369 375 338 352 358 339   SODIUM mmol/L 141 140 138 139 137  --    POTASSIUM mmol/L 3.8 3.8 3.9 3.7 3.6  --    CHLORIDE mmol/L 103 104 107 110* 109*  --    CO2 mmol/L 29.0 28.0 24.0 20.0* 21.0*  --    BUN mg/dL 11 8 11 14 14  --    CREATININE mg/dL 0.47* 0.45* 0.47* 0.50* 0.51*  --    GLUCOSE mg/dL 129* 106* 199* 96 147*  --    CALCIUM mg/dL 8.2* 8.2* 7.7* 7.9* 7.5*  --      Results from last 7 days   Lab Units 10/21/19  0634 10/20/19  0536 10/19/19  0749 10/18/19  0953   BILIRUBIN mg/dL <0.2* <0.2* <0.2* <0.2*   ALK PHOS U/L 246* 197* 211* 230*   ALT (SGPT) U/L 15 13 13 16   AST (SGOT) U/L 19 17 13 20           Invalid input(s): TG, LDLCALC, LDLREALC        Brief Urine Lab Results  (Last result in the past 365 days)      Color   Clarity   Blood   Leuk Est   Nitrite   Protein   CREAT   Urine HCG        10/08/19 2256 Yellow Cloudy Moderate (2+) Small (1+) Positive Negative               Microbiology Results Abnormal     Procedure Component Value - Date/Time    Urine Culture - Urine, Urine, Clean Catch [[id number]]  (Abnormal)  (Susceptibility) Collected:  10/08/19 2256    Lab Status:  Edited Result - FINAL Specimen:  Urine, Clean Catch Updated:  10/23/19 1215     Urine Culture >100,000 CFU/mL Klebsiella pneumoniae ssp pneumoniae      >100,000 CFU/mL Escherichia coli    Susceptibility      Klebsiella pneumoniae ssp pneumoniae     EYAD     Ampicillin Resistant     Ampicillin + Sulbactam Susceptible     Cefazolin Susceptible     Cefepime Susceptible     Ceftazidime Susceptible     Ceftriaxone Susceptible     Ertapenem Susceptible [1]      Gentamicin Susceptible     Levofloxacin Susceptible     Meropenem Susceptible [1]      Nitrofurantoin Intermediate     Piperacillin + Tazobactam Susceptible     Tetracycline Susceptible     Trimethoprim + Sulfamethoxazole Susceptible            [1]   Appended report. These results have been appended to a previously  final verified report.             Susceptibility      Escherichia coli     EYAD     Ampicillin Resistant     Ampicillin + Sulbactam Intermediate     Cefazolin Susceptible     Cefepime Susceptible     Ceftazidime Susceptible     Ceftriaxone Susceptible     Ertapenem Susceptible [1]      Gentamicin Susceptible     Levofloxacin Resistant     Meropenem Susceptible [1]      Nitrofurantoin Susceptible     Piperacillin + Tazobactam Susceptible     Tetracycline Susceptible     Trimethoprim + Sulfamethoxazole Susceptible            [1]   Appended report. These results have been appended to a previously final verified report.             Susceptibility Comments     Klebsiella pneumoniae ssp pneumoniae    Dr. Dong requests ertapenem and meropenem.    Escherichia coli    Dr. dong requests ertapenem and meropenem.             Body Fluid Culture - Body Fluid, Peritoneum [[id number]]  (Abnormal)  (Susceptibility) Collected:  10/09/19 1508    Lab Status:  Edited Result - FINAL Specimen:  Body Fluid from Peritoneum Updated:  10/23/19 1213     Body Fluid Culture Moderate growth (3+) Klebsiella pneumoniae ssp pneumoniae     Gram Stain Many (4+) WBCs per low power field      Moderate (3+) Gram negative bacilli    Susceptibility      Klebsiella pneumoniae ssp pneumoniae     EYAD     Ampicillin Resistant     Ampicillin + Sulbactam Susceptible     Cefazolin Susceptible     Cefepime Susceptible     Ceftazidime Susceptible     Ceftriaxone Susceptible     Ertapenem Susceptible [1]      Gentamicin Susceptible     Levofloxacin Susceptible     Meropenem Susceptible [1]      Piperacillin + Tazobactam Susceptible     Trimethoprim + Sulfamethoxazole Susceptible            [1]   Appended report. These results have been appended to a previously final verified report.             Susceptibility Comments     Klebsiella pneumoniae ssp pneumoniae    Dr. Dong requests ertapenem and meropenem             Fungus Culture - Body Fluid, Peritoneum  [[id number]] Collected:  10/09/19 1508    Lab Status:  Preliminary result Specimen:  Body Fluid from Peritoneum Updated:  10/16/19 1704     Fungus Culture No fungus isolated at 1 week    Anaerobic Culture - Body Fluid, Peritoneum [[id number]] Collected:  10/09/19 1508    Lab Status:  Final result Specimen:  Body Fluid from Peritoneum Updated:  10/14/19 0935     Anaerobic Culture No anaerobes isolated at 5 days    Blood Culture - Blood, Arm, Right [[id number]]  (Abnormal) Collected:  10/08/19 2012    Lab Status:  Final result Specimen:  Blood from Arm, Right Updated:  10/10/19 2122     Blood Culture Klebsiella pneumoniae ssp pneumoniae     Isolated from Aerobic and Anaerobic Bottles     Gram Stain Aerobic Bottle Gram negative bacilli      Anaerobic Bottle Gram negative bacilli    Narrative:       Refer to blood culture collected 10/8/2019 at 2000 for MICs.    Blood Culture - Blood, Arm, Left [[id number]]  (Abnormal)  (Susceptibility) Collected:  10/08/19 2000    Lab Status:  Final result Specimen:  Blood from Arm, Left Updated:  10/10/19 2122     Blood Culture Klebsiella pneumoniae ssp pneumoniae     Isolated from Aerobic and Anaerobic Bottles     Gram Stain Anaerobic Bottle Gram negative bacilli      Aerobic Bottle Gram negative bacilli    Susceptibility      Klebsiella pneumoniae ssp pneumoniae     EYAD     Ampicillin Resistant     Ampicillin + Sulbactam Susceptible     Cefazolin Susceptible     Cefepime Susceptible     Ceftazidime Susceptible     Ceftriaxone Susceptible     Gentamicin Susceptible     Levofloxacin Susceptible     Piperacillin + Tazobactam Susceptible     Trimethoprim + Sulfamethoxazole Susceptible                    Blood Culture ID, PCR - Blood, Arm, Left [[id number]]  (Abnormal) Collected:  10/08/19 2000    Lab Status:  Final result Specimen:  Blood from Arm, Left Updated:  10/09/19 1038     BCID, PCR Klebsiella pneumoniae. Identification by BCID PCR.          Imaging Results (all)     Procedure  Component Value Units Date/Time    CT Abdomen Pelvis With Contrast [[id number]] Collected:  10/18/19 1452     Updated:  10/18/19 1730    Narrative:       EXAMINATION: CT ABDOMEN AND PELVIS WITH CONTRAST-10/18/2019:      INDICATION: Reassess perinephric abscess and rule out other  intraabdominal pathology; N15.1-Renal and perinephric abscess;  A41.9-Sepsis, unspecified organism; E86.0-Dehydration; E11.65-Type 2  diabetes mellitus with hyperglycemia; I10-Essential (primary)  hypertension; Z86.14-Personal history of Methicillin resistant  Staphylococcus aureus infection; K74.60-Unspecified cirrhosis of liver;  Z74.09-Other reduced m.      TECHNIQUE: CT abdomen and pelvis with intravenous contrast.     The radiation dose reduction device was turned on for each scan per the  ALARA (As Low as Reasonably Achievable) protocol.     COMPARISON: CT dated 10/09/2019.     FINDINGS: The lung bases demonstrate numerous nodular densities within  the bilateral lung bases some of which are calcified, however, the  majority of which are noncalcified measuring up to 13 mm right lower  lobe medial segment which has central area of potential lucency and  developing cavitation along with a separate site in the more superior  right lower lobe raising suspicion for infectious or inflammatory  process along with trace bilateral pleural effusions, right greater than  left.     Liver without focal lesion. Gallbladder unremarkable. No biliary  dilatation. Pancreas and spleen unremarkable. Adrenals demonstrate  stable left adrenal nodule. Kidneys without hydronephrosis or  hydroureter demonstrating a grossly stable appearing fluid collection of  likely subcapsular/perinephric location inferior pole right kidney  without significant change from prior. No gas containing within this  fluid collection or new soft tissue enhancement/nodularity. GI tract  without focal thickening or disproportionate dilatation of bowel. No new  focal fluid collection  or abscess is evident. Pelvic viscera  unremarkable. Degenerative disease of the spine without aggressive  osseous lesion. Mild body wall edema.       Impression:       1.  Grossly stable appearance of perinephric/subcapsular fluid  associated with the right kidney without evidence for progressive  nodularity/enhancement or gas contained within this collection from  prior comparison.  2.  No new focal fluid collection or loculated fluid collection within  the abdomen.  3.  The lung bases demonstrate numerous bilateral pulmonary nodules  several of which were present on prior examinations including largest  medial segment right lower lobe with now trace bilateral pleural  effusions. Some of these have foci of central lucency concerning for  cavitary components of infectious or inflammatory process.     D:  10/18/2019  E:  10/18/2019     This report was finalized on 10/18/2019 5:27 PM by Dr. Jb Campos.       CT Guided Abscess Drain Retroperitoneal [[id number]] Collected:  10/09/19 1611     Updated:  10/09/19 1715    Narrative:       EXAMINATION: CT GUIDED ABSCESS DRAIN RETROPERITONEAL- 10/09/2019     INDICATION: N15.1-Renal and perinephric abscess; A41.9-Sepsis,  unspecified organism; E86.0-Dehydration; E11.65-Type 2 diabetes mellitus  with hyperglycemia; I10-Essential (primary) hypertension;  Z86.14-Personal history of Methicillin resistant Staphylococcus aureus  infection; K74.60-Unspecified cirrhosis of liver; fluid collection     TECHNIQUE: CT-guided drainage of a perinephric fluid collection     The radiation dose reduction device was turned on for each scan per the  ALARA (As Low as Reasonably Achievable) protocol.     COMPARISON: NONE     FINDINGS: Patient referred for CT-guided drainage of a perinephric fluid  collection. Procedure and risks were explained to the patient. Informed  consent was obtained and signed. Patient placed in the left lateral  position upon the table. The right flank was prepped and  draped in a  sterile fashion. 1% lidocaine used as a local anesthetic. Under CT  fluoroscopic guidance an 18-gauge 15 cm Chiba needle was advanced into  the perinephric fluid collection of approximately 10 ml of a reddish  fluid was removed with some debris within the fluid. There is a striated  delayed nephrogram seen of the right kidney suggesting possible  pyelonephritis with surrounding stranding of the perinephric fat.  Myelolipoma seen on the adrenal gland on the left. Chiba needle was  removed and no immediate complication occurred. Slight decrease seen in  size of the perinephric fluid collection status post drainage.       Impression:       CT-guided drainage of a perinephric fluid collection with no  immediate complication.     D:  10/09/2019  E:  10/09/2019     This report was finalized on 10/9/2019 5:12 PM by Dr. Nella Enriquez MD.       CT Abdomen Pelvis With Contrast [[id number]] Collected:  10/08/19 2202     Updated:  10/08/19 2222    Narrative:       CT ABDOMEN AND PELVIS WITH CONTRAST, 10/8/2019    HISTORY:  49-year-old male in the ED complaining of 2 day history right side abdomen pain, nausea, vomiting and generalized weakness. History of hepatic cirrhosis (DUNLAP). History of diabetes with left BKA and multiple abdominal wall abscesses and lower extremity  cellulitis in 2018.    TECHNIQUE:  CT imaging of the abdomen and pelvis with IV contrast. Radiation dose reduction techniques included automated exposure control. Radiation audit for CT and nuclear cardiology exams in the last 12 months: 7.    COMPARISON:  *  CT abdomen/pelvis, 8/18/2019.    ABDOMEN FINDINGS:  Images show a right lower perinephric fluid collection that is either subcapsular or pericapsular. This extends along the lower kidney for a length of about 8.5 cm and has a maximum thickness of 2.5 cm. The fluid is mixed attenuation and may be  hemorrhagic. Correlate for any recent history of blunt trauma to the flank. Renal parenchyma  shows no disruption or abnormal enhancement. There is no fracture of overlying right lower posterior ribs or transverse spinous processes. Given the history,  perinephric abscess is possible but is significantly less likely given normal renal parenchymal enhancement. No evidence of urinary obstruction. No visible nephrolithiasis.    Advanced hepatic cirrhosis. Mild splenomegaly. No suspicious liver lesion. No upper abdominal ascites. Hepatic vasculature appears patent. No gallbladder distention or bile duct dilatation. Negative pancreas.    Benign left adrenal myelolipoma measuring about 2.9 cm. Normal caliber abdominal aorta. Small bowel and colon are normal in caliber and appearance, as imaged. The appendix is normal. No gastric distention or distal esophageal dilatation.    PELVIS FINDINGS:  Air within the urinary bladder, likely iatrogenic. There is at least mild diffuse bladder wall thickening. Prostate and rectum are unremarkable. No inguinal hernia.    Lung base images show multiple benign calcified granulomas.      Impression:       1.  Small mixed attenuation right perinephric fluid collection along the inferior capsule, not present on the prior exam. Perinephric hematoma is a consideration. Correlate clinically for any history of blunt flank trauma. While abscess is possible, this  is unlikely as right renal parenchyma enhances normally with no evidence of pyelonephritis. Laboratory correlation recommended.  2.  Advanced hepatic cirrhosis. Splenomegaly.  3.  Urinary bladder wall thickening. Gas within the bladder is most likely iatrogenic.  4.  Benign left adrenal myelolipoma.    Signer Name: Fadi Healy MD   Signed: 10/8/2019 10:02 PM   Workstation Name: [email or web address]    [facility]    XR Chest PA & Lateral [[id number]] Collected:  10/08/19 2210     Updated:  10/08/19 2212    Narrative:       CHEST X-RAY, 10/8/2019      HISTORY:    49-year-old male in the ED with reported  diabetic ketoacidosis.      TECHNIQUE:  AP portable upright chest series.      FINDINGS:  Heart size and pulmonary vascularity are normal. The lungs are expanded and clear. No visible pulmonary infiltrate or pleural effusion. Lower lung benign calcified granulomas.      Impression:       Negative chest.    Signer Name: Fadi Healy MD   Signed: 10/8/2019 10:10 PM   Workstation Name: [id number]    [facility]          Results for orders placed during the hospital encounter of 10/08/19   Duplex Venous Lower Extremity - Right CAR    Narrative · Normal right lower extremity venous duplex scan.          Results for orders placed during the hospital encounter of 10/08/19   Duplex Venous Lower Extremity - Right CAR    Narrative · Normal right lower extremity venous duplex scan.          Results for orders placed during the hospital encounter of 05/18/19   Adult Transthoracic Echo Complete W/ Cont if Necessary Per Protocol    Narrative · Estimated EF = 60%.  · Mild mitral valve regurgitation is present  · Mild tricuspid valve regurgitation is present.  · Calculated right ventricular systolic pressure from tricuspid   regurgitation is 29 mmHg.           Order Current Status    Fungus Culture - Body Fluid, Peritoneum Preliminary result        Discharge Details        Discharge Medications      New Medications      Instructions Start Date   ertapenem 1 gm/100ml solution IV  Commonly known as:  INVanz   1 g, Intravenous, Every 24 Hours Scheduled   Start Date:  10/24/2019     saccharomyces boulardii 250 MG capsule  Commonly known as:  FLORASTOR   250 mg, Oral, 2 Times Daily      vancomycin 50 MG/ML reconstituted solution oral solution reconstituted   125 mg, Oral, Every 6 Hours Scheduled      witch hazel-glycerin pad  Commonly known as:  TUCKS   Topical, As Needed, OTC         Changes to Medications      Instructions Start Date   LANTUS 100 UNIT/ML injection  Generic drug:  insulin glargine  What  changed:  how much to take   20 Units, Subcutaneous, 2 Times Daily         Continue These Medications      Instructions Start Date   amLODIPine 10 MG tablet  Commonly known as:  NORVASC   10 mg, Oral, Every 24 Hours Scheduled      DULoxetine 30 MG capsule  Commonly known as:  CYMBALTA   30 mg, Oral, Daily      insulin lispro 100 UNIT/ML injection  Commonly known as:  humaLOG   5 Units, Subcutaneous, 4 Times Daily With Meals & Nightly, Plus sliding scale      metoprolol tartrate 100 MG tablet  Commonly known as:  LOPRESSOR   100 mg, Oral, Every 12 Hours      nystatin 941997 UNIT/GM cream  Commonly known as:  MYCOSTATIN   Topical, 2 Times Daily      sevelamer 0.8 g pack packet  Commonly known as:  RENVELA   800 mg, Oral, 3 Times Daily With Meals      terazosin 5 MG capsule  Commonly known as:  HYTRIN   5 mg, Oral, Nightly         Stop These Medications    hydrALAZINE 50 MG tablet  Commonly known as:  APRESOLINE     HYDROcodone-acetaminophen  MG per tablet  Commonly known as:  NORCO     oxyCODONE 10 MG tablet  Commonly known as:  ROXICODONE     promethazine 12.5 MG tablet  Commonly known as:  PHENERGAN     tamsulosin 0.4 MG capsule 24 hr capsule  Commonly known as:  FLOMAX        ASK your doctor about these medications      Instructions Start Date   aspirin 81 MG tablet   81 mg, Oral, Daily             No Known Allergies      Discharge Disposition:      Diet:  Hospital:  Diet Order   Procedures   • Diet Regular; Consistent Carbohydrate     Discharge:   Diet Instructions     Diet: Consistent Carbohydrate, Regular      Discharge Diet:   Consistent Carbohydrate  Regular             Discharge Activity:   Activity Instructions     Activity as Tolerated              CODE STATUS:    Code Status and Medical Interventions:   Ordered at: 10/08/19 7114     Level Of Support Discussed With:    Patient     Code Status:    CPR     Medical Interventions (Level of Support Prior to Arrest):    Full         No future  appointments.    Additional Instructions for the Follow-ups that You Need to Schedule     Ambulatory Referral to Home Health   As directed      CBC/CMP every Monday  PICC line dressing care every Monday    Order Comments:  CBC/CMP every Monday PICC line dressing care every Monday     Face to Face Visit Date:  10/23/2019    Follow-up provider for Plan of Care?:  I treated the patient in an acute care facility and will not continue treatment after discharge.    Follow-up provider:  YODIT CHAMORRO [[id number]]    Reason/Clinical Findings:  right ankle wound, perinephric abscess, perirectal abscess,    Describe mobility limitations that make leaving home difficult:  left below the knee amputation, debility, weakness    Nursing/Therapeutic Services Requested:  Skilled Nursing Physical Therapy Occupational Therapy    Skilled nursing orders:  PICC line care/instruction Wound care dressing/changes Medication education    Instructions:  Monitor right lateral ankle wound, Hydrofera blue every other day to right lateral ankle    PT orders:  Transfer training Strengthening Therapeutic exercise    Occupational orders:  Activities of daily living Strengthening Home safety assessment    Frequency:  Other         Discharge Follow-up with PCP   As directed       Currently Documented PCP:    No primary care provider on file.    PCP Phone Number:    None     Follow Up Details:  1 week         Discharge Follow-up with Specified Provider: follow up with Dr. Payne per his recommendation   As directed      To:  follow up with Dr. Payne per his recommendation         Discharge Follow-up with Specified Provider: on Friday with Dr. Dong in clinic   As directed      To:  on Friday with Dr. Dong in clinic         Discharge Follow-up with Specified Provider: with Urology per their recommendation   As directed      To:  with Urology per their recommendation               Time Spent on Discharge:  45 minutes    Electronically signed by Edwige SWAIN  CHINTAN Melgar, 10/23/19, 1:47 PM.        Electronically signed by Edwige Melgar APRN at 10/23/19 3010

## 2019-10-24 ENCOUNTER — READMISSION MANAGEMENT (OUTPATIENT)
Dept: CALL CENTER | Facility: HOSPITAL | Age: 49
End: 2019-10-24

## 2019-10-24 ENCOUNTER — TELEPHONE (OUTPATIENT)
Dept: PEDIATRICS | Facility: OTHER | Age: 49
End: 2019-10-24

## 2019-10-24 NOTE — TELEPHONE ENCOUNTER
[facility] IS CALLING TO SAY THAT THE PATIENTS BP RUNS 160/96 AND HAS A PIC LINE, WOUND ON RIGHT ANKLE, HAS HAD TO HAVE EMS COME AN GET HIM OFF OF THE TOILET.  THINKS HE NEEDS TO BE IN A REHAB FACILITY.  HE STATES THAT HE DOES NOT HAVE HIS BP MEDICINE.  JUST WANTED TO LET THE DOCTOR HAVE A LITTLE INFORMATION ON HIM.

## 2019-10-24 NOTE — OUTREACH NOTE
Prep Survey      Responses   Facility patient discharged from?  [facility]   Is patient eligible?  Yes   Discharge diagnosis  Perinephric abscess    Does the patient have one of the following disease processes/diagnoses(primary or secondary)?  Sepsis   Does the patient have Home health ordered?  Yes   What is the Home health agency?   [facility]    Is there a DME ordered?  No   General alerts for this patient  IV ABX   Prep survey completed?  Yes          Moraima Delgado RN

## 2019-10-25 ENCOUNTER — READMISSION MANAGEMENT (OUTPATIENT)
Dept: CALL CENTER | Facility: HOSPITAL | Age: 49
End: 2019-10-25

## 2019-10-25 NOTE — OUTREACH NOTE
Sepsis Week 1 Survey      Responses   Facility patient discharged from?  [facility]   Does the patient have one of the following disease processes/diagnoses(primary or secondary)?  Sepsis   Is there a successful TCM telephone encounter documented?  No   Week 1 attempt successful?  Yes   Call start time  1259   Rescheduled  Revoked   Call end time  1302   General alerts for this patient  Pt's wife is calling an ambulance at this time. Pt is not thriving at home and wants him to be readmitted in order to go to rehab.          Meseret So RN

## 2019-10-26 ENCOUNTER — APPOINTMENT (OUTPATIENT)
Dept: CT IMAGING | Facility: HOSPITAL | Age: 49
End: 2019-10-26

## 2019-10-26 ENCOUNTER — HOSPITAL ENCOUNTER (INPATIENT)
Facility: HOSPITAL | Age: 49
LOS: 19 days | Discharge: HOME HEALTH | End: 2019-11-14
Attending: EMERGENCY MEDICINE | Admitting: HOSPITALIST

## 2019-10-26 DIAGNOSIS — Z89.512 S/P BKA (BELOW KNEE AMPUTATION), LEFT (HCC): ICD-10-CM

## 2019-10-26 DIAGNOSIS — B96.1 BACTEREMIA DUE TO KLEBSIELLA PNEUMONIAE: ICD-10-CM

## 2019-10-26 DIAGNOSIS — Z78.9 IMPAIRED MOBILITY AND ADLS: ICD-10-CM

## 2019-10-26 DIAGNOSIS — N15.1 PERINEPHRIC ABSCESS: Primary | ICD-10-CM

## 2019-10-26 DIAGNOSIS — Z79.4 TYPE 2 DIABETES MELLITUS WITH DIABETIC PERIPHERAL ANGIOPATHY AND GANGRENE, WITH LONG-TERM CURRENT USE OF INSULIN (HCC): Chronic | ICD-10-CM

## 2019-10-26 DIAGNOSIS — Z87.898 HISTORY OF BACTEREMIA: ICD-10-CM

## 2019-10-26 DIAGNOSIS — Z86.19 HISTORY OF SEPSIS: ICD-10-CM

## 2019-10-26 DIAGNOSIS — I10 ESSENTIAL HYPERTENSION: ICD-10-CM

## 2019-10-26 DIAGNOSIS — L97.919 DIABETIC ULCER OF RIGHT LOWER LEG (HCC): ICD-10-CM

## 2019-10-26 DIAGNOSIS — Z74.09 IMPAIRED MOBILITY AND ADLS: ICD-10-CM

## 2019-10-26 DIAGNOSIS — E11.52 TYPE 2 DIABETES MELLITUS WITH DIABETIC PERIPHERAL ANGIOPATHY AND GANGRENE, WITH LONG-TERM CURRENT USE OF INSULIN (HCC): Chronic | ICD-10-CM

## 2019-10-26 DIAGNOSIS — I73.9 PERIPHERAL VASCULAR DISEASE (HCC): ICD-10-CM

## 2019-10-26 DIAGNOSIS — R78.81 BACTEREMIA DUE TO KLEBSIELLA PNEUMONIAE: ICD-10-CM

## 2019-10-26 DIAGNOSIS — IMO0002 DM (DIABETES MELLITUS) TYPE II UNCONTROLLED, PERIPH VASCULAR DISORDER: ICD-10-CM

## 2019-10-26 DIAGNOSIS — E11.622 DIABETIC ULCER OF RIGHT LOWER LEG (HCC): ICD-10-CM

## 2019-10-26 LAB
ALBUMIN SERPL-MCNC: 2.7 G/DL (ref 3.5–5.2)
ALBUMIN/GLOB SERPL: 0.7 G/DL
ALP SERPL-CCNC: 258 U/L (ref 39–117)
ALT SERPL W P-5'-P-CCNC: 19 U/L (ref 1–41)
ANION GAP SERPL CALCULATED.3IONS-SCNC: 11 MMOL/L (ref 5–15)
AST SERPL-CCNC: 18 U/L (ref 1–40)
BASOPHILS # BLD AUTO: 0.13 10*3/MM3 (ref 0–0.2)
BASOPHILS NFR BLD AUTO: 1 % (ref 0–1.5)
BILIRUB SERPL-MCNC: 0.2 MG/DL (ref 0.2–1.2)
BUN BLD-MCNC: 14 MG/DL (ref 6–20)
BUN/CREAT SERPL: 23 (ref 7–25)
CALCIUM SPEC-SCNC: 8.2 MG/DL (ref 8.6–10.5)
CHLORIDE SERPL-SCNC: 99 MMOL/L (ref 98–107)
CK SERPL-CCNC: 29 U/L (ref 20–200)
CO2 SERPL-SCNC: 29 MMOL/L (ref 22–29)
CREAT BLD-MCNC: 0.61 MG/DL (ref 0.76–1.27)
D-LACTATE SERPL-SCNC: 2.2 MMOL/L (ref 0.5–2)
DEPRECATED RDW RBC AUTO: 43.5 FL (ref 37–54)
EOSINOPHIL # BLD AUTO: 0.29 10*3/MM3 (ref 0–0.4)
EOSINOPHIL NFR BLD AUTO: 2.3 % (ref 0.3–6.2)
ERYTHROCYTE [DISTWIDTH] IN BLOOD BY AUTOMATED COUNT: 14 % (ref 12.3–15.4)
GFR SERPL CREATININE-BSD FRML MDRD: 139 ML/MIN/1.73
GLOBULIN UR ELPH-MCNC: 3.8 GM/DL
GLUCOSE BLD-MCNC: 217 MG/DL (ref 65–99)
GLUCOSE BLDC GLUCOMTR-MCNC: 180 MG/DL (ref 70–130)
HCT VFR BLD AUTO: 33.5 % (ref 37.5–51)
HGB BLD-MCNC: 10.5 G/DL (ref 13–17.7)
HOLD SPECIMEN: NORMAL
IMM GRANULOCYTES # BLD AUTO: 0.06 10*3/MM3 (ref 0–0.05)
IMM GRANULOCYTES NFR BLD AUTO: 0.5 % (ref 0–0.5)
LIPASE SERPL-CCNC: 9 U/L (ref 13–60)
LYMPHOCYTES # BLD AUTO: 2.58 10*3/MM3 (ref 0.7–3.1)
LYMPHOCYTES NFR BLD AUTO: 20.7 % (ref 19.6–45.3)
MAGNESIUM SERPL-MCNC: 1.6 MG/DL (ref 1.6–2.6)
MCH RBC QN AUTO: 26.9 PG (ref 26.6–33)
MCHC RBC AUTO-ENTMCNC: 31.3 G/DL (ref 31.5–35.7)
MCV RBC AUTO: 85.9 FL (ref 79–97)
MONOCYTES # BLD AUTO: 0.81 10*3/MM3 (ref 0.1–0.9)
MONOCYTES NFR BLD AUTO: 6.5 % (ref 5–12)
NEUTROPHILS # BLD AUTO: 8.58 10*3/MM3 (ref 1.7–7)
NEUTROPHILS NFR BLD AUTO: 69 % (ref 42.7–76)
NRBC BLD AUTO-RTO: 0 /100 WBC (ref 0–0.2)
PLATELET # BLD AUTO: 342 10*3/MM3 (ref 140–450)
PMV BLD AUTO: 10.5 FL (ref 6–12)
POTASSIUM BLD-SCNC: 3.8 MMOL/L (ref 3.5–5.2)
PROCALCITONIN SERPL-MCNC: 0.12 NG/ML (ref 0.1–0.25)
PROT SERPL-MCNC: 6.5 G/DL (ref 6–8.5)
RBC # BLD AUTO: 3.9 10*6/MM3 (ref 4.14–5.8)
SODIUM BLD-SCNC: 139 MMOL/L (ref 136–145)
WBC NRBC COR # BLD: 12.45 10*3/MM3 (ref 3.4–10.8)

## 2019-10-26 PROCEDURE — 83605 ASSAY OF LACTIC ACID: CPT | Performed by: NURSE PRACTITIONER

## 2019-10-26 PROCEDURE — 80053 COMPREHEN METABOLIC PANEL: CPT | Performed by: NURSE PRACTITIONER

## 2019-10-26 PROCEDURE — G0378 HOSPITAL OBSERVATION PER HR: HCPCS

## 2019-10-26 PROCEDURE — 82962 GLUCOSE BLOOD TEST: CPT

## 2019-10-26 PROCEDURE — 25010000002 MEROPENEM PER 100 MG: Performed by: NURSE PRACTITIONER

## 2019-10-26 PROCEDURE — 83690 ASSAY OF LIPASE: CPT | Performed by: NURSE PRACTITIONER

## 2019-10-26 PROCEDURE — 85025 COMPLETE CBC W/AUTO DIFF WBC: CPT | Performed by: NURSE PRACTITIONER

## 2019-10-26 PROCEDURE — 25010000002 HEPARIN (PORCINE) PER 1000 UNITS: Performed by: NURSE PRACTITIONER

## 2019-10-26 PROCEDURE — 99284 EMERGENCY DEPT VISIT MOD MDM: CPT

## 2019-10-26 PROCEDURE — 83735 ASSAY OF MAGNESIUM: CPT | Performed by: NURSE PRACTITIONER

## 2019-10-26 PROCEDURE — 63710000001 INSULIN DETEMIR PER 5 UNITS: Performed by: NURSE PRACTITIONER

## 2019-10-26 PROCEDURE — 74177 CT ABD & PELVIS W/CONTRAST: CPT

## 2019-10-26 PROCEDURE — 87040 BLOOD CULTURE FOR BACTERIA: CPT | Performed by: NURSE PRACTITIONER

## 2019-10-26 PROCEDURE — 84145 PROCALCITONIN (PCT): CPT | Performed by: NURSE PRACTITIONER

## 2019-10-26 PROCEDURE — 82550 ASSAY OF CK (CPK): CPT | Performed by: NURSE PRACTITIONER

## 2019-10-26 PROCEDURE — 25010000002 IOPAMIDOL 61 % SOLUTION: Performed by: FAMILY MEDICINE

## 2019-10-26 PROCEDURE — 99223 1ST HOSP IP/OBS HIGH 75: CPT | Performed by: FAMILY MEDICINE

## 2019-10-26 RX ORDER — ACETAMINOPHEN 325 MG/1
650 TABLET ORAL EVERY 4 HOURS PRN
Status: DISCONTINUED | OUTPATIENT
Start: 2019-10-26 | End: 2019-11-15 | Stop reason: HOSPADM

## 2019-10-26 RX ORDER — SODIUM CHLORIDE 0.9 % (FLUSH) 0.9 %
10 SYRINGE (ML) INJECTION AS NEEDED
Status: DISCONTINUED | OUTPATIENT
Start: 2019-10-26 | End: 2019-11-15 | Stop reason: HOSPADM

## 2019-10-26 RX ORDER — SODIUM CHLORIDE 0.9 % (FLUSH) 0.9 %
10 SYRINGE (ML) INJECTION EVERY 12 HOURS SCHEDULED
Status: DISCONTINUED | OUTPATIENT
Start: 2019-10-26 | End: 2019-11-15 | Stop reason: HOSPADM

## 2019-10-26 RX ORDER — ONDANSETRON 2 MG/ML
4 INJECTION INTRAMUSCULAR; INTRAVENOUS EVERY 6 HOURS PRN
Status: DISCONTINUED | OUTPATIENT
Start: 2019-10-26 | End: 2019-11-15 | Stop reason: HOSPADM

## 2019-10-26 RX ORDER — NICOTINE POLACRILEX 4 MG
15 LOZENGE BUCCAL
Status: DISCONTINUED | OUTPATIENT
Start: 2019-10-26 | End: 2019-11-15 | Stop reason: HOSPADM

## 2019-10-26 RX ORDER — SEVELAMER CARBONATE FOR ORAL SUSPENSION 800 MG/1
800 POWDER, FOR SUSPENSION ORAL
Status: DISCONTINUED | OUTPATIENT
Start: 2019-10-27 | End: 2019-11-11

## 2019-10-26 RX ORDER — ACETAMINOPHEN 650 MG/1
650 SUPPOSITORY RECTAL EVERY 4 HOURS PRN
Status: DISCONTINUED | OUTPATIENT
Start: 2019-10-26 | End: 2019-11-15 | Stop reason: HOSPADM

## 2019-10-26 RX ORDER — AMLODIPINE BESYLATE 10 MG/1
10 TABLET ORAL
Status: DISCONTINUED | OUTPATIENT
Start: 2019-10-27 | End: 2019-11-15 | Stop reason: HOSPADM

## 2019-10-26 RX ORDER — ASPIRIN 81 MG/1
81 TABLET ORAL DAILY
Status: DISCONTINUED | OUTPATIENT
Start: 2019-10-27 | End: 2019-11-15 | Stop reason: HOSPADM

## 2019-10-26 RX ORDER — ACETAMINOPHEN 160 MG/5ML
650 SOLUTION ORAL EVERY 4 HOURS PRN
Status: DISCONTINUED | OUTPATIENT
Start: 2019-10-26 | End: 2019-11-15 | Stop reason: HOSPADM

## 2019-10-26 RX ORDER — METOPROLOL TARTRATE 100 MG/1
100 TABLET ORAL EVERY 12 HOURS
Status: DISCONTINUED | OUTPATIENT
Start: 2019-10-26 | End: 2019-11-15 | Stop reason: HOSPADM

## 2019-10-26 RX ORDER — HEPARIN SODIUM 5000 [USP'U]/ML
5000 INJECTION, SOLUTION INTRAVENOUS; SUBCUTANEOUS EVERY 8 HOURS SCHEDULED
Status: DISCONTINUED | OUTPATIENT
Start: 2019-10-26 | End: 2019-11-15 | Stop reason: HOSPADM

## 2019-10-26 RX ORDER — TERAZOSIN 5 MG/1
5 CAPSULE ORAL NIGHTLY
Status: DISCONTINUED | OUTPATIENT
Start: 2019-10-26 | End: 2019-11-15 | Stop reason: HOSPADM

## 2019-10-26 RX ORDER — NYSTATIN 100000 U/G
CREAM TOPICAL 2 TIMES DAILY
Status: DISCONTINUED | OUTPATIENT
Start: 2019-10-26 | End: 2019-11-15 | Stop reason: HOSPADM

## 2019-10-26 RX ORDER — SACCHAROMYCES BOULARDII 250 MG
250 CAPSULE ORAL 2 TIMES DAILY
Status: DISCONTINUED | OUTPATIENT
Start: 2019-10-26 | End: 2019-11-15 | Stop reason: HOSPADM

## 2019-10-26 RX ORDER — DEXTROSE MONOHYDRATE 25 G/50ML
25 INJECTION, SOLUTION INTRAVENOUS
Status: DISCONTINUED | OUTPATIENT
Start: 2019-10-26 | End: 2019-11-15 | Stop reason: HOSPADM

## 2019-10-26 RX ORDER — DULOXETIN HYDROCHLORIDE 30 MG/1
30 CAPSULE, DELAYED RELEASE ORAL DAILY
Status: DISCONTINUED | OUTPATIENT
Start: 2019-10-27 | End: 2019-11-06

## 2019-10-26 RX ORDER — SODIUM CHLORIDE 9 MG/ML
100 INJECTION, SOLUTION INTRAVENOUS CONTINUOUS
Status: DISCONTINUED | OUTPATIENT
Start: 2019-10-26 | End: 2019-11-06

## 2019-10-26 RX ADMIN — VANCOMYCIN HYDROCHLORIDE 125 MG: KIT at 23:36

## 2019-10-26 RX ADMIN — TERAZOSIN HYDROCHLORIDE ANHYDROUS 5 MG: 5 CAPSULE ORAL at 22:12

## 2019-10-26 RX ADMIN — MEROPENEM 1 G: 1 INJECTION INTRAVENOUS at 22:11

## 2019-10-26 RX ADMIN — IOPAMIDOL 100 ML: 612 INJECTION, SOLUTION INTRAVENOUS at 19:53

## 2019-10-26 RX ADMIN — METOPROLOL TARTRATE 100 MG: 100 TABLET ORAL at 22:12

## 2019-10-26 RX ADMIN — Medication 250 MG: at 22:12

## 2019-10-26 RX ADMIN — HEPARIN SODIUM 5000 UNITS: 5000 INJECTION INTRAVENOUS; SUBCUTANEOUS at 22:29

## 2019-10-26 RX ADMIN — SODIUM CHLORIDE 100 ML/HR: 9 INJECTION, SOLUTION INTRAVENOUS at 21:58

## 2019-10-26 RX ADMIN — INSULIN DETEMIR 25 UNITS: 100 INJECTION, SOLUTION SUBCUTANEOUS at 22:12

## 2019-10-26 RX ADMIN — SODIUM CHLORIDE 1000 ML: 9 INJECTION, SOLUTION INTRAVENOUS at 18:30

## 2019-10-27 LAB
ALBUMIN SERPL-MCNC: 2.5 G/DL (ref 3.5–5.2)
ALBUMIN/GLOB SERPL: 0.6 G/DL
ALP SERPL-CCNC: 238 U/L (ref 39–117)
ALT SERPL W P-5'-P-CCNC: 17 U/L (ref 1–41)
ANION GAP SERPL CALCULATED.3IONS-SCNC: 10 MMOL/L (ref 5–15)
AST SERPL-CCNC: 16 U/L (ref 1–40)
BASOPHILS # BLD AUTO: 0.11 10*3/MM3 (ref 0–0.2)
BASOPHILS NFR BLD AUTO: 1 % (ref 0–1.5)
BILIRUB SERPL-MCNC: <0.2 MG/DL (ref 0.2–1.2)
BUN BLD-MCNC: 14 MG/DL (ref 6–20)
BUN/CREAT SERPL: 25.5 (ref 7–25)
CALCIUM SPEC-SCNC: 7.8 MG/DL (ref 8.6–10.5)
CHLORIDE SERPL-SCNC: 100 MMOL/L (ref 98–107)
CO2 SERPL-SCNC: 31 MMOL/L (ref 22–29)
CREAT BLD-MCNC: 0.55 MG/DL (ref 0.76–1.27)
D-LACTATE SERPL-SCNC: 1.4 MMOL/L (ref 0.5–2)
DEPRECATED RDW RBC AUTO: 45.2 FL (ref 37–54)
EOSINOPHIL # BLD AUTO: 0.3 10*3/MM3 (ref 0–0.4)
EOSINOPHIL NFR BLD AUTO: 2.8 % (ref 0.3–6.2)
ERYTHROCYTE [DISTWIDTH] IN BLOOD BY AUTOMATED COUNT: 14.4 % (ref 12.3–15.4)
GFR SERPL CREATININE-BSD FRML MDRD: >150 ML/MIN/1.73
GLOBULIN UR ELPH-MCNC: 3.9 GM/DL
GLUCOSE BLD-MCNC: 199 MG/DL (ref 65–99)
GLUCOSE BLDC GLUCOMTR-MCNC: 87 MG/DL (ref 70–130)
GLUCOSE BLDC GLUCOMTR-MCNC: 98 MG/DL (ref 70–130)
HCT VFR BLD AUTO: 30.2 % (ref 37.5–51)
HGB BLD-MCNC: 9.4 G/DL (ref 13–17.7)
IMM GRANULOCYTES # BLD AUTO: 0.04 10*3/MM3 (ref 0–0.05)
IMM GRANULOCYTES NFR BLD AUTO: 0.4 % (ref 0–0.5)
LYMPHOCYTES # BLD AUTO: 2.25 10*3/MM3 (ref 0.7–3.1)
LYMPHOCYTES NFR BLD AUTO: 21.1 % (ref 19.6–45.3)
MAGNESIUM SERPL-MCNC: 1.5 MG/DL (ref 1.6–2.6)
MAGNESIUM SERPL-MCNC: 1.5 MG/DL (ref 1.6–2.6)
MCH RBC QN AUTO: 27.1 PG (ref 26.6–33)
MCHC RBC AUTO-ENTMCNC: 31.1 G/DL (ref 31.5–35.7)
MCV RBC AUTO: 87 FL (ref 79–97)
MONOCYTES # BLD AUTO: 0.8 10*3/MM3 (ref 0.1–0.9)
MONOCYTES NFR BLD AUTO: 7.5 % (ref 5–12)
NEUTROPHILS # BLD AUTO: 7.14 10*3/MM3 (ref 1.7–7)
NEUTROPHILS NFR BLD AUTO: 67.2 % (ref 42.7–76)
NRBC BLD AUTO-RTO: 0 /100 WBC (ref 0–0.2)
PLATELET # BLD AUTO: 320 10*3/MM3 (ref 140–450)
PMV BLD AUTO: 10.8 FL (ref 6–12)
POTASSIUM BLD-SCNC: 3.6 MMOL/L (ref 3.5–5.2)
PROT SERPL-MCNC: 6.4 G/DL (ref 6–8.5)
RBC # BLD AUTO: 3.47 10*6/MM3 (ref 4.14–5.8)
SODIUM BLD-SCNC: 141 MMOL/L (ref 136–145)
WBC NRBC COR # BLD: 10.64 10*3/MM3 (ref 3.4–10.8)

## 2019-10-27 PROCEDURE — 97166 OT EVAL MOD COMPLEX 45 MIN: CPT | Performed by: OCCUPATIONAL THERAPIST

## 2019-10-27 PROCEDURE — 25010000002 MEROPENEM PER 100 MG: Performed by: NURSE PRACTITIONER

## 2019-10-27 PROCEDURE — 25010000002 HEPARIN (PORCINE) PER 1000 UNITS: Performed by: NURSE PRACTITIONER

## 2019-10-27 PROCEDURE — 85025 COMPLETE CBC W/AUTO DIFF WBC: CPT | Performed by: NURSE PRACTITIONER

## 2019-10-27 PROCEDURE — 63710000001 INSULIN LISPRO (HUMAN) PER 5 UNITS: Performed by: NURSE PRACTITIONER

## 2019-10-27 PROCEDURE — 80053 COMPREHEN METABOLIC PANEL: CPT | Performed by: NURSE PRACTITIONER

## 2019-10-27 PROCEDURE — 25010000002 ONDANSETRON PER 1 MG: Performed by: NURSE PRACTITIONER

## 2019-10-27 PROCEDURE — 25010000002 MEROPENEM: Performed by: NURSE PRACTITIONER

## 2019-10-27 PROCEDURE — 82962 GLUCOSE BLOOD TEST: CPT

## 2019-10-27 PROCEDURE — 63710000001 INSULIN DETEMIR PER 5 UNITS: Performed by: NURSE PRACTITIONER

## 2019-10-27 PROCEDURE — 83735 ASSAY OF MAGNESIUM: CPT | Performed by: NURSE PRACTITIONER

## 2019-10-27 PROCEDURE — 97161 PT EVAL LOW COMPLEX 20 MIN: CPT

## 2019-10-27 PROCEDURE — 25010000002 MAGNESIUM SULFATE 2 GM/50ML SOLUTION: Performed by: INTERNAL MEDICINE

## 2019-10-27 PROCEDURE — 99233 SBSQ HOSP IP/OBS HIGH 50: CPT | Performed by: INTERNAL MEDICINE

## 2019-10-27 PROCEDURE — G0378 HOSPITAL OBSERVATION PER HR: HCPCS

## 2019-10-27 RX ORDER — POTASSIUM CHLORIDE 7.45 MG/ML
10 INJECTION INTRAVENOUS
Status: DISCONTINUED | OUTPATIENT
Start: 2019-10-27 | End: 2019-11-15 | Stop reason: HOSPADM

## 2019-10-27 RX ORDER — MAGNESIUM SULFATE HEPTAHYDRATE 40 MG/ML
2 INJECTION, SOLUTION INTRAVENOUS AS NEEDED
Status: DISCONTINUED | OUTPATIENT
Start: 2019-10-27 | End: 2019-11-15 | Stop reason: HOSPADM

## 2019-10-27 RX ORDER — POTASSIUM CHLORIDE 1.5 G/1.77G
40 POWDER, FOR SOLUTION ORAL AS NEEDED
Status: DISCONTINUED | OUTPATIENT
Start: 2019-10-27 | End: 2019-11-15 | Stop reason: HOSPADM

## 2019-10-27 RX ORDER — MAGNESIUM SULFATE HEPTAHYDRATE 40 MG/ML
4 INJECTION, SOLUTION INTRAVENOUS AS NEEDED
Status: DISCONTINUED | OUTPATIENT
Start: 2019-10-27 | End: 2019-11-15 | Stop reason: HOSPADM

## 2019-10-27 RX ORDER — POTASSIUM CHLORIDE 750 MG/1
40 CAPSULE, EXTENDED RELEASE ORAL AS NEEDED
Status: DISCONTINUED | OUTPATIENT
Start: 2019-10-27 | End: 2019-11-15 | Stop reason: HOSPADM

## 2019-10-27 RX ADMIN — TERAZOSIN HYDROCHLORIDE ANHYDROUS 5 MG: 5 CAPSULE ORAL at 20:00

## 2019-10-27 RX ADMIN — MEROPENEM 500 MG: 500 INJECTION, POWDER, FOR SOLUTION INTRAVENOUS at 09:39

## 2019-10-27 RX ADMIN — INSULIN DETEMIR 25 UNITS: 100 INJECTION, SOLUTION SUBCUTANEOUS at 08:40

## 2019-10-27 RX ADMIN — INSULIN LISPRO 5 UNITS: 100 INJECTION, SOLUTION INTRAVENOUS; SUBCUTANEOUS at 12:22

## 2019-10-27 RX ADMIN — NYSTATIN: 100000 CREAM TOPICAL at 12:25

## 2019-10-27 RX ADMIN — HEPARIN SODIUM 5000 UNITS: 5000 INJECTION INTRAVENOUS; SUBCUTANEOUS at 05:27

## 2019-10-27 RX ADMIN — POTASSIUM CHLORIDE 40 MEQ: 750 CAPSULE, EXTENDED RELEASE ORAL at 16:26

## 2019-10-27 RX ADMIN — METOPROLOL TARTRATE 100 MG: 100 TABLET ORAL at 20:00

## 2019-10-27 RX ADMIN — POTASSIUM CHLORIDE 40 MEQ: 750 CAPSULE, EXTENDED RELEASE ORAL at 21:13

## 2019-10-27 RX ADMIN — Medication 250 MG: at 20:00

## 2019-10-27 RX ADMIN — MEROPENEM 500 MG: 500 INJECTION, POWDER, FOR SOLUTION INTRAVENOUS at 21:14

## 2019-10-27 RX ADMIN — HEPARIN SODIUM 5000 UNITS: 5000 INJECTION INTRAVENOUS; SUBCUTANEOUS at 21:14

## 2019-10-27 RX ADMIN — Medication 250 MG: at 08:32

## 2019-10-27 RX ADMIN — ONDANSETRON 4 MG: 2 INJECTION INTRAMUSCULAR; INTRAVENOUS at 09:57

## 2019-10-27 RX ADMIN — DULOXETINE HYDROCHLORIDE 30 MG: 30 CAPSULE, DELAYED RELEASE ORAL at 08:32

## 2019-10-27 RX ADMIN — MEROPENEM 500 MG: 500 INJECTION, POWDER, FOR SOLUTION INTRAVENOUS at 03:36

## 2019-10-27 RX ADMIN — MEROPENEM 500 MG: 500 INJECTION, POWDER, FOR SOLUTION INTRAVENOUS at 16:26

## 2019-10-27 RX ADMIN — SODIUM CHLORIDE, PRESERVATIVE FREE 10 ML: 5 INJECTION INTRAVENOUS at 08:34

## 2019-10-27 RX ADMIN — MAGNESIUM SULFATE 2 G: 2 INJECTION INTRAVENOUS at 20:14

## 2019-10-27 RX ADMIN — VANCOMYCIN HYDROCHLORIDE 125 MG: KIT at 05:27

## 2019-10-27 RX ADMIN — SODIUM CHLORIDE 100 ML/HR: 9 INJECTION, SOLUTION INTRAVENOUS at 09:57

## 2019-10-27 RX ADMIN — MAGNESIUM SULFATE 2 G: 2 INJECTION INTRAVENOUS at 21:55

## 2019-10-27 RX ADMIN — VANCOMYCIN HYDROCHLORIDE 125 MG: KIT at 12:22

## 2019-10-27 RX ADMIN — NYSTATIN: 100000 CREAM TOPICAL at 20:19

## 2019-10-27 RX ADMIN — INSULIN LISPRO 5 UNITS: 100 INJECTION, SOLUTION INTRAVENOUS; SUBCUTANEOUS at 08:33

## 2019-10-27 RX ADMIN — METOPROLOL TARTRATE 100 MG: 100 TABLET ORAL at 08:32

## 2019-10-27 RX ADMIN — ASPIRIN 81 MG: 81 TABLET, COATED ORAL at 08:33

## 2019-10-27 RX ADMIN — HEPARIN SODIUM 5000 UNITS: 5000 INJECTION INTRAVENOUS; SUBCUTANEOUS at 14:02

## 2019-10-27 RX ADMIN — AMLODIPINE BESYLATE 10 MG: 10 TABLET ORAL at 08:32

## 2019-10-28 LAB
BACTERIA UR QL AUTO: ABNORMAL /HPF
BILIRUB UR QL STRIP: NEGATIVE
CLARITY UR: ABNORMAL
COLOR UR: YELLOW
GLUCOSE BLDC GLUCOMTR-MCNC: 186 MG/DL (ref 70–130)
GLUCOSE BLDC GLUCOMTR-MCNC: 214 MG/DL (ref 70–130)
GLUCOSE BLDC GLUCOMTR-MCNC: 263 MG/DL (ref 70–130)
GLUCOSE BLDC GLUCOMTR-MCNC: 65 MG/DL (ref 70–130)
GLUCOSE BLDC GLUCOMTR-MCNC: 79 MG/DL (ref 70–130)
GLUCOSE UR STRIP-MCNC: NEGATIVE MG/DL
HGB UR QL STRIP.AUTO: NEGATIVE
HYALINE CASTS UR QL AUTO: ABNORMAL /LPF
KETONES UR QL STRIP: NEGATIVE
LEUKOCYTE ESTERASE UR QL STRIP.AUTO: ABNORMAL
MAGNESIUM SERPL-MCNC: 2.4 MG/DL (ref 1.6–2.6)
NITRITE UR QL STRIP: NEGATIVE
PH UR STRIP.AUTO: 6 [PH] (ref 5–8)
POTASSIUM BLD-SCNC: 4 MMOL/L (ref 3.5–5.2)
PROT UR QL STRIP: NEGATIVE
RBC # UR: ABNORMAL /HPF
REF LAB TEST METHOD: ABNORMAL
SP GR UR STRIP: 1.02 (ref 1–1.03)
SQUAMOUS #/AREA URNS HPF: ABNORMAL /HPF
UROBILINOGEN UR QL STRIP: ABNORMAL
WBC UR QL AUTO: ABNORMAL /HPF

## 2019-10-28 PROCEDURE — 25010000002 MEROPENEM: Performed by: NURSE PRACTITIONER

## 2019-10-28 PROCEDURE — 97110 THERAPEUTIC EXERCISES: CPT

## 2019-10-28 PROCEDURE — 99232 SBSQ HOSP IP/OBS MODERATE 35: CPT | Performed by: NURSE PRACTITIONER

## 2019-10-28 PROCEDURE — 25010000002 ONDANSETRON PER 1 MG: Performed by: NURSE PRACTITIONER

## 2019-10-28 PROCEDURE — 83735 ASSAY OF MAGNESIUM: CPT | Performed by: INTERNAL MEDICINE

## 2019-10-28 PROCEDURE — 87086 URINE CULTURE/COLONY COUNT: CPT | Performed by: INTERNAL MEDICINE

## 2019-10-28 PROCEDURE — 81001 URINALYSIS AUTO W/SCOPE: CPT | Performed by: INTERNAL MEDICINE

## 2019-10-28 PROCEDURE — 82962 GLUCOSE BLOOD TEST: CPT

## 2019-10-28 PROCEDURE — 63710000001 INSULIN LISPRO (HUMAN) PER 5 UNITS: Performed by: NURSE PRACTITIONER

## 2019-10-28 PROCEDURE — 63710000001 INSULIN DETEMIR PER 5 UNITS: Performed by: NURSE PRACTITIONER

## 2019-10-28 PROCEDURE — 25010000002 HEPARIN (PORCINE) PER 1000 UNITS: Performed by: NURSE PRACTITIONER

## 2019-10-28 PROCEDURE — 25010000002 PIPERACILLIN SOD-TAZOBACTAM PER 1 G: Performed by: INTERNAL MEDICINE

## 2019-10-28 PROCEDURE — 25010000002 MAGNESIUM SULFATE 2 GM/50ML SOLUTION: Performed by: INTERNAL MEDICINE

## 2019-10-28 PROCEDURE — 84132 ASSAY OF SERUM POTASSIUM: CPT | Performed by: INTERNAL MEDICINE

## 2019-10-28 RX ADMIN — TERAZOSIN HYDROCHLORIDE ANHYDROUS 5 MG: 5 CAPSULE ORAL at 22:32

## 2019-10-28 RX ADMIN — HEPARIN SODIUM 5000 UNITS: 5000 INJECTION INTRAVENOUS; SUBCUTANEOUS at 14:52

## 2019-10-28 RX ADMIN — METOPROLOL TARTRATE 100 MG: 100 TABLET ORAL at 22:35

## 2019-10-28 RX ADMIN — TAZOBACTAM SODIUM AND PIPERACILLIN SODIUM 3.38 G: 375; 3 INJECTION, SOLUTION INTRAVENOUS at 10:52

## 2019-10-28 RX ADMIN — SEVELAMER CARBONATE 0.8 G: 0.8 POWDER, FOR SUSPENSION ORAL at 17:02

## 2019-10-28 RX ADMIN — ASPIRIN 81 MG: 81 TABLET, COATED ORAL at 08:53

## 2019-10-28 RX ADMIN — AMLODIPINE BESYLATE 10 MG: 10 TABLET ORAL at 08:52

## 2019-10-28 RX ADMIN — Medication 250 MG: at 22:32

## 2019-10-28 RX ADMIN — INSULIN LISPRO 5 UNITS: 100 INJECTION, SOLUTION INTRAVENOUS; SUBCUTANEOUS at 08:50

## 2019-10-28 RX ADMIN — INSULIN LISPRO 5 UNITS: 100 INJECTION, SOLUTION INTRAVENOUS; SUBCUTANEOUS at 13:04

## 2019-10-28 RX ADMIN — TAZOBACTAM SODIUM AND PIPERACILLIN SODIUM 3.38 G: 375; 3 INJECTION, SOLUTION INTRAVENOUS at 16:54

## 2019-10-28 RX ADMIN — MAGNESIUM SULFATE 2 G: 2 INJECTION INTRAVENOUS at 01:34

## 2019-10-28 RX ADMIN — VANCOMYCIN HYDROCHLORIDE 125 MG: KIT at 13:02

## 2019-10-28 RX ADMIN — SODIUM CHLORIDE, PRESERVATIVE FREE 10 ML: 5 INJECTION INTRAVENOUS at 08:51

## 2019-10-28 RX ADMIN — INSULIN DETEMIR 25 UNITS: 100 INJECTION, SOLUTION SUBCUTANEOUS at 08:52

## 2019-10-28 RX ADMIN — METOPROLOL TARTRATE 100 MG: 100 TABLET ORAL at 08:53

## 2019-10-28 RX ADMIN — SEVELAMER CARBONATE 0.8 G: 0.8 POWDER, FOR SUSPENSION ORAL at 13:02

## 2019-10-28 RX ADMIN — DULOXETINE HYDROCHLORIDE 30 MG: 30 CAPSULE, DELAYED RELEASE ORAL at 08:51

## 2019-10-28 RX ADMIN — ONDANSETRON 4 MG: 2 INJECTION INTRAMUSCULAR; INTRAVENOUS at 05:37

## 2019-10-28 RX ADMIN — NYSTATIN: 100000 CREAM TOPICAL at 08:54

## 2019-10-28 RX ADMIN — INSULIN LISPRO 3 UNITS: 100 INJECTION, SOLUTION INTRAVENOUS; SUBCUTANEOUS at 22:32

## 2019-10-28 RX ADMIN — MEROPENEM 500 MG: 500 INJECTION, POWDER, FOR SOLUTION INTRAVENOUS at 05:19

## 2019-10-28 RX ADMIN — INSULIN DETEMIR 25 UNITS: 100 INJECTION, SOLUTION SUBCUTANEOUS at 22:35

## 2019-10-28 RX ADMIN — VANCOMYCIN HYDROCHLORIDE 125 MG: KIT at 18:33

## 2019-10-28 RX ADMIN — SEVELAMER CARBONATE 0.8 G: 0.8 POWDER, FOR SUSPENSION ORAL at 08:51

## 2019-10-28 RX ADMIN — NYSTATIN: 100000 CREAM TOPICAL at 22:33

## 2019-10-28 RX ADMIN — SODIUM CHLORIDE, PRESERVATIVE FREE 10 ML: 5 INJECTION INTRAVENOUS at 22:34

## 2019-10-28 RX ADMIN — Medication 250 MG: at 08:52

## 2019-10-29 ENCOUNTER — APPOINTMENT (OUTPATIENT)
Dept: CT IMAGING | Facility: HOSPITAL | Age: 49
End: 2019-10-29

## 2019-10-29 LAB
ANION GAP SERPL CALCULATED.3IONS-SCNC: 6 MMOL/L (ref 5–15)
APTT PPP: 37.6 SECONDS (ref 24–37)
BACTERIA SPEC AEROBE CULT: ABNORMAL
BUN BLD-MCNC: 8 MG/DL (ref 6–20)
BUN/CREAT SERPL: 14.8 (ref 7–25)
CALCIUM SPEC-SCNC: 7.7 MG/DL (ref 8.6–10.5)
CHLORIDE SERPL-SCNC: 102 MMOL/L (ref 98–107)
CO2 SERPL-SCNC: 29 MMOL/L (ref 22–29)
CREAT BLD-MCNC: 0.54 MG/DL (ref 0.76–1.27)
GFR SERPL CREATININE-BSD FRML MDRD: >150 ML/MIN/1.73
GLUCOSE BLD-MCNC: 190 MG/DL (ref 65–99)
GLUCOSE BLDC GLUCOMTR-MCNC: 129 MG/DL (ref 70–130)
GLUCOSE BLDC GLUCOMTR-MCNC: 131 MG/DL (ref 70–130)
GLUCOSE BLDC GLUCOMTR-MCNC: 161 MG/DL (ref 70–130)
GLUCOSE BLDC GLUCOMTR-MCNC: 187 MG/DL (ref 70–130)
INR PPP: 1.09 (ref 0.85–1.16)
POTASSIUM BLD-SCNC: 4 MMOL/L (ref 3.5–5.2)
PROTHROMBIN TIME: 13.5 SECONDS (ref 11.2–14.3)
SODIUM BLD-SCNC: 137 MMOL/L (ref 136–145)

## 2019-10-29 PROCEDURE — 25010000002 MIDAZOLAM PER 1 MG

## 2019-10-29 PROCEDURE — 0T9030Z DRAINAGE OF RIGHT KIDNEY WITH DRAINAGE DEVICE, PERCUTANEOUS APPROACH: ICD-10-PCS | Performed by: RADIOLOGY

## 2019-10-29 PROCEDURE — 25010000003 LIDOCAINE 1 % SOLUTION: Performed by: RADIOLOGY

## 2019-10-29 PROCEDURE — 25010000002 FENTANYL CITRATE (PF) 100 MCG/2ML SOLUTION

## 2019-10-29 PROCEDURE — 85610 PROTHROMBIN TIME: CPT | Performed by: RADIOLOGY

## 2019-10-29 PROCEDURE — 25010000002 HEPARIN (PORCINE) PER 1000 UNITS: Performed by: NURSE PRACTITIONER

## 2019-10-29 PROCEDURE — 25010000002 MIDAZOLAM PER 1 MG: Performed by: RADIOLOGY

## 2019-10-29 PROCEDURE — 85730 THROMBOPLASTIN TIME PARTIAL: CPT | Performed by: RADIOLOGY

## 2019-10-29 PROCEDURE — 82962 GLUCOSE BLOOD TEST: CPT

## 2019-10-29 PROCEDURE — 87015 SPECIMEN INFECT AGNT CONCNTJ: CPT | Performed by: UROLOGY

## 2019-10-29 PROCEDURE — 25010000002 PIPERACILLIN SOD-TAZOBACTAM PER 1 G: Performed by: INTERNAL MEDICINE

## 2019-10-29 PROCEDURE — 87186 SC STD MICRODIL/AGAR DIL: CPT | Performed by: UROLOGY

## 2019-10-29 PROCEDURE — 87070 CULTURE OTHR SPECIMN AEROBIC: CPT | Performed by: UROLOGY

## 2019-10-29 PROCEDURE — C1729 CATH, DRAINAGE: HCPCS

## 2019-10-29 PROCEDURE — 99233 SBSQ HOSP IP/OBS HIGH 50: CPT | Performed by: INTERNAL MEDICINE

## 2019-10-29 PROCEDURE — 87205 SMEAR GRAM STAIN: CPT | Performed by: UROLOGY

## 2019-10-29 PROCEDURE — 75989 ABSCESS DRAINAGE UNDER X-RAY: CPT

## 2019-10-29 PROCEDURE — 63710000001 INSULIN DETEMIR PER 5 UNITS: Performed by: NURSE PRACTITIONER

## 2019-10-29 PROCEDURE — G0108 DIAB MANAGE TRN  PER INDIV: HCPCS | Performed by: REGISTERED NURSE

## 2019-10-29 PROCEDURE — 80048 BASIC METABOLIC PNL TOTAL CA: CPT | Performed by: NURSE PRACTITIONER

## 2019-10-29 PROCEDURE — 25010000002 FENTANYL CITRATE (PF) 100 MCG/2ML SOLUTION: Performed by: RADIOLOGY

## 2019-10-29 PROCEDURE — 97110 THERAPEUTIC EXERCISES: CPT

## 2019-10-29 RX ORDER — FENTANYL CITRATE 50 UG/ML
50 INJECTION, SOLUTION INTRAMUSCULAR; INTRAVENOUS
Status: COMPLETED | OUTPATIENT
Start: 2019-10-29 | End: 2019-10-29

## 2019-10-29 RX ORDER — MIDAZOLAM HYDROCHLORIDE 1 MG/ML
INJECTION INTRAMUSCULAR; INTRAVENOUS
Status: COMPLETED
Start: 2019-10-29 | End: 2019-10-29

## 2019-10-29 RX ORDER — MIDAZOLAM HYDROCHLORIDE 1 MG/ML
2 INJECTION INTRAMUSCULAR; INTRAVENOUS
Status: COMPLETED | OUTPATIENT
Start: 2019-10-29 | End: 2019-10-29

## 2019-10-29 RX ORDER — LIDOCAINE HYDROCHLORIDE 10 MG/ML
20 INJECTION, SOLUTION INFILTRATION; PERINEURAL ONCE
Status: COMPLETED | OUTPATIENT
Start: 2019-10-29 | End: 2019-10-29

## 2019-10-29 RX ORDER — FENTANYL CITRATE 50 UG/ML
INJECTION, SOLUTION INTRAMUSCULAR; INTRAVENOUS
Status: COMPLETED
Start: 2019-10-29 | End: 2019-10-29

## 2019-10-29 RX ADMIN — INSULIN DETEMIR 25 UNITS: 100 INJECTION, SOLUTION SUBCUTANEOUS at 09:08

## 2019-10-29 RX ADMIN — METOPROLOL TARTRATE 100 MG: 100 TABLET ORAL at 22:23

## 2019-10-29 RX ADMIN — TAZOBACTAM SODIUM AND PIPERACILLIN SODIUM 3.38 G: 375; 3 INJECTION, SOLUTION INTRAVENOUS at 03:06

## 2019-10-29 RX ADMIN — TAZOBACTAM SODIUM AND PIPERACILLIN SODIUM 3.38 G: 375; 3 INJECTION, SOLUTION INTRAVENOUS at 09:02

## 2019-10-29 RX ADMIN — VANCOMYCIN HYDROCHLORIDE 125 MG: KIT at 05:30

## 2019-10-29 RX ADMIN — LIDOCAINE HYDROCHLORIDE 20 ML: 10 INJECTION, SOLUTION INFILTRATION; PERINEURAL at 10:37

## 2019-10-29 RX ADMIN — MIDAZOLAM HYDROCHLORIDE 1 MG: 1 INJECTION, SOLUTION INTRAMUSCULAR; INTRAVENOUS at 10:44

## 2019-10-29 RX ADMIN — METOPROLOL TARTRATE 100 MG: 100 TABLET ORAL at 09:05

## 2019-10-29 RX ADMIN — INSULIN LISPRO 2 UNITS: 100 INJECTION, SOLUTION INTRAVENOUS; SUBCUTANEOUS at 09:05

## 2019-10-29 RX ADMIN — SODIUM CHLORIDE, PRESERVATIVE FREE 10 ML: 5 INJECTION INTRAVENOUS at 09:02

## 2019-10-29 RX ADMIN — AMLODIPINE BESYLATE 10 MG: 10 TABLET ORAL at 09:05

## 2019-10-29 RX ADMIN — HEPARIN SODIUM 5000 UNITS: 5000 INJECTION INTRAVENOUS; SUBCUTANEOUS at 22:23

## 2019-10-29 RX ADMIN — NYSTATIN: 100000 CREAM TOPICAL at 09:04

## 2019-10-29 RX ADMIN — HEPARIN SODIUM 5000 UNITS: 5000 INJECTION INTRAVENOUS; SUBCUTANEOUS at 05:30

## 2019-10-29 RX ADMIN — SEVELAMER CARBONATE 0.8 G: 0.8 POWDER, FOR SUSPENSION ORAL at 09:05

## 2019-10-29 RX ADMIN — MIDAZOLAM HYDROCHLORIDE 2 MG: 1 INJECTION, SOLUTION INTRAMUSCULAR; INTRAVENOUS at 12:49

## 2019-10-29 RX ADMIN — MIDAZOLAM 1 MG: 1 INJECTION INTRAMUSCULAR; INTRAVENOUS at 10:44

## 2019-10-29 RX ADMIN — VANCOMYCIN HYDROCHLORIDE 125 MG: KIT at 12:48

## 2019-10-29 RX ADMIN — FENTANYL CITRATE 50 MCG: 50 INJECTION, SOLUTION INTRAMUSCULAR; INTRAVENOUS at 12:49

## 2019-10-29 RX ADMIN — INSULIN DETEMIR 25 UNITS: 100 INJECTION, SOLUTION SUBCUTANEOUS at 22:25

## 2019-10-29 RX ADMIN — SODIUM CHLORIDE, PRESERVATIVE FREE 10 ML: 5 INJECTION INTRAVENOUS at 22:23

## 2019-10-29 RX ADMIN — FENTANYL CITRATE 25 MCG: 50 INJECTION, SOLUTION INTRAMUSCULAR; INTRAVENOUS at 10:43

## 2019-10-29 RX ADMIN — Medication 250 MG: at 22:22

## 2019-10-29 RX ADMIN — DULOXETINE HYDROCHLORIDE 30 MG: 30 CAPSULE, DELAYED RELEASE ORAL at 09:05

## 2019-10-29 RX ADMIN — ASPIRIN 81 MG: 81 TABLET, COATED ORAL at 09:05

## 2019-10-29 RX ADMIN — TAZOBACTAM SODIUM AND PIPERACILLIN SODIUM 3.38 G: 375; 3 INJECTION, SOLUTION INTRAVENOUS at 17:11

## 2019-10-29 RX ADMIN — SEVELAMER CARBONATE 0.8 G: 0.8 POWDER, FOR SUSPENSION ORAL at 17:11

## 2019-10-29 RX ADMIN — SEVELAMER CARBONATE 0.8 G: 0.8 POWDER, FOR SUSPENSION ORAL at 12:48

## 2019-10-29 RX ADMIN — INSULIN LISPRO 2 UNITS: 100 INJECTION, SOLUTION INTRAVENOUS; SUBCUTANEOUS at 17:11

## 2019-10-29 RX ADMIN — NYSTATIN: 100000 CREAM TOPICAL at 22:24

## 2019-10-29 RX ADMIN — ACETAMINOPHEN 650 MG: 325 TABLET ORAL at 22:23

## 2019-10-29 RX ADMIN — TERAZOSIN HYDROCHLORIDE ANHYDROUS 5 MG: 5 CAPSULE ORAL at 22:22

## 2019-10-29 RX ADMIN — HEPARIN SODIUM 5000 UNITS: 5000 INJECTION INTRAVENOUS; SUBCUTANEOUS at 15:49

## 2019-10-29 RX ADMIN — Medication 250 MG: at 09:05

## 2019-10-29 RX ADMIN — VANCOMYCIN HYDROCHLORIDE 125 MG: KIT at 00:26

## 2019-10-30 ENCOUNTER — APPOINTMENT (OUTPATIENT)
Dept: ULTRASOUND IMAGING | Facility: HOSPITAL | Age: 49
End: 2019-10-30

## 2019-10-30 LAB
ALBUMIN SERPL-MCNC: 2.4 G/DL (ref 3.5–5.2)
ALBUMIN/GLOB SERPL: 0.6 G/DL
ALP SERPL-CCNC: 303 U/L (ref 39–117)
ALT SERPL W P-5'-P-CCNC: 18 U/L (ref 1–41)
ANION GAP SERPL CALCULATED.3IONS-SCNC: 9 MMOL/L (ref 5–15)
AST SERPL-CCNC: 19 U/L (ref 1–40)
BILIRUB SERPL-MCNC: 0.2 MG/DL (ref 0.2–1.2)
BUN BLD-MCNC: 9 MG/DL (ref 6–20)
BUN/CREAT SERPL: 16.4 (ref 7–25)
C DIFF TOX GENS STL QL NAA+PROBE: NOT DETECTED
CALCIUM SPEC-SCNC: 8.2 MG/DL (ref 8.6–10.5)
CHLORIDE SERPL-SCNC: 99 MMOL/L (ref 98–107)
CO2 SERPL-SCNC: 28 MMOL/L (ref 22–29)
CREAT BLD-MCNC: 0.55 MG/DL (ref 0.76–1.27)
DEPRECATED RDW RBC AUTO: 45.4 FL (ref 37–54)
ERYTHROCYTE [DISTWIDTH] IN BLOOD BY AUTOMATED COUNT: 14.3 % (ref 12.3–15.4)
GFR SERPL CREATININE-BSD FRML MDRD: >150 ML/MIN/1.73
GLOBULIN UR ELPH-MCNC: 4.2 GM/DL
GLUCOSE BLD-MCNC: 109 MG/DL (ref 65–99)
GLUCOSE BLDC GLUCOMTR-MCNC: 101 MG/DL (ref 70–130)
GLUCOSE BLDC GLUCOMTR-MCNC: 128 MG/DL (ref 70–130)
GLUCOSE BLDC GLUCOMTR-MCNC: 158 MG/DL (ref 70–130)
GLUCOSE BLDC GLUCOMTR-MCNC: 96 MG/DL (ref 70–130)
HCT VFR BLD AUTO: 32.6 % (ref 37.5–51)
HGB BLD-MCNC: 10 G/DL (ref 13–17.7)
MCH RBC QN AUTO: 27 PG (ref 26.6–33)
MCHC RBC AUTO-ENTMCNC: 30.7 G/DL (ref 31.5–35.7)
MCV RBC AUTO: 87.9 FL (ref 79–97)
PLATELET # BLD AUTO: 300 10*3/MM3 (ref 140–450)
PMV BLD AUTO: 10.6 FL (ref 6–12)
POTASSIUM BLD-SCNC: 4 MMOL/L (ref 3.5–5.2)
PROT SERPL-MCNC: 6.6 G/DL (ref 6–8.5)
RBC # BLD AUTO: 3.71 10*6/MM3 (ref 4.14–5.8)
SODIUM BLD-SCNC: 136 MMOL/L (ref 136–145)
WBC NRBC COR # BLD: 15.98 10*3/MM3 (ref 3.4–10.8)

## 2019-10-30 PROCEDURE — 82962 GLUCOSE BLOOD TEST: CPT

## 2019-10-30 PROCEDURE — 25010000002 ONDANSETRON PER 1 MG: Performed by: NURSE PRACTITIONER

## 2019-10-30 PROCEDURE — 87177 OVA AND PARASITES SMEARS: CPT | Performed by: INTERNAL MEDICINE

## 2019-10-30 PROCEDURE — 99233 SBSQ HOSP IP/OBS HIGH 50: CPT | Performed by: INTERNAL MEDICINE

## 2019-10-30 PROCEDURE — 25010000002 PIPERACILLIN SOD-TAZOBACTAM PER 1 G: Performed by: INTERNAL MEDICINE

## 2019-10-30 PROCEDURE — 63710000001 INSULIN DETEMIR PER 5 UNITS: Performed by: NURSE PRACTITIONER

## 2019-10-30 PROCEDURE — 87209 SMEAR COMPLEX STAIN: CPT | Performed by: INTERNAL MEDICINE

## 2019-10-30 PROCEDURE — 80053 COMPREHEN METABOLIC PANEL: CPT | Performed by: INTERNAL MEDICINE

## 2019-10-30 PROCEDURE — 76705 ECHO EXAM OF ABDOMEN: CPT

## 2019-10-30 PROCEDURE — 87045 FECES CULTURE AEROBIC BACT: CPT | Performed by: INTERNAL MEDICINE

## 2019-10-30 PROCEDURE — 87046 STOOL CULTR AEROBIC BACT EA: CPT | Performed by: INTERNAL MEDICINE

## 2019-10-30 PROCEDURE — 25010000002 FLUCONAZOLE PER 200 MG: Performed by: INTERNAL MEDICINE

## 2019-10-30 PROCEDURE — 87493 C DIFF AMPLIFIED PROBE: CPT | Performed by: INTERNAL MEDICINE

## 2019-10-30 PROCEDURE — 87427 SHIGA-LIKE TOXIN AG IA: CPT | Performed by: INTERNAL MEDICINE

## 2019-10-30 PROCEDURE — 87999 UNLISTED MICROBIOLOGY PX: CPT | Performed by: INTERNAL MEDICINE

## 2019-10-30 PROCEDURE — 25010000002 HEPARIN (PORCINE) PER 1000 UNITS: Performed by: NURSE PRACTITIONER

## 2019-10-30 PROCEDURE — 85027 COMPLETE CBC AUTOMATED: CPT | Performed by: INTERNAL MEDICINE

## 2019-10-30 RX ORDER — FLUCONAZOLE 2 MG/ML
200 INJECTION, SOLUTION INTRAVENOUS DAILY
Status: COMPLETED | OUTPATIENT
Start: 2019-10-30 | End: 2019-11-01

## 2019-10-30 RX ADMIN — AMLODIPINE BESYLATE 10 MG: 10 TABLET ORAL at 10:09

## 2019-10-30 RX ADMIN — SEVELAMER CARBONATE 0.8 G: 0.8 POWDER, FOR SUSPENSION ORAL at 12:56

## 2019-10-30 RX ADMIN — TAZOBACTAM SODIUM AND PIPERACILLIN SODIUM 3.38 G: 375; 3 INJECTION, SOLUTION INTRAVENOUS at 17:43

## 2019-10-30 RX ADMIN — DULOXETINE HYDROCHLORIDE 30 MG: 30 CAPSULE, DELAYED RELEASE ORAL at 10:09

## 2019-10-30 RX ADMIN — Medication 250 MG: at 10:09

## 2019-10-30 RX ADMIN — INSULIN DETEMIR 25 UNITS: 100 INJECTION, SOLUTION SUBCUTANEOUS at 22:19

## 2019-10-30 RX ADMIN — HEPARIN SODIUM 5000 UNITS: 5000 INJECTION INTRAVENOUS; SUBCUTANEOUS at 22:21

## 2019-10-30 RX ADMIN — ONDANSETRON 4 MG: 2 INJECTION INTRAMUSCULAR; INTRAVENOUS at 04:28

## 2019-10-30 RX ADMIN — FLUCONAZOLE 200 MG: 200 INJECTION, SOLUTION INTRAVENOUS at 10:08

## 2019-10-30 RX ADMIN — ASPIRIN 81 MG: 81 TABLET, COATED ORAL at 10:09

## 2019-10-30 RX ADMIN — TAZOBACTAM SODIUM AND PIPERACILLIN SODIUM 3.38 G: 375; 3 INJECTION, SOLUTION INTRAVENOUS at 10:08

## 2019-10-30 RX ADMIN — METOPROLOL TARTRATE 100 MG: 100 TABLET ORAL at 22:20

## 2019-10-30 RX ADMIN — INSULIN LISPRO 2 UNITS: 100 INJECTION, SOLUTION INTRAVENOUS; SUBCUTANEOUS at 10:34

## 2019-10-30 RX ADMIN — NYSTATIN: 100000 CREAM TOPICAL at 10:20

## 2019-10-30 RX ADMIN — INSULIN DETEMIR 25 UNITS: 100 INJECTION, SOLUTION SUBCUTANEOUS at 10:17

## 2019-10-30 RX ADMIN — TAZOBACTAM SODIUM AND PIPERACILLIN SODIUM 3.38 G: 375; 3 INJECTION, SOLUTION INTRAVENOUS at 01:18

## 2019-10-30 RX ADMIN — Medication 250 MG: at 22:20

## 2019-10-30 RX ADMIN — METOPROLOL TARTRATE 100 MG: 100 TABLET ORAL at 10:09

## 2019-10-30 RX ADMIN — TERAZOSIN HYDROCHLORIDE ANHYDROUS 5 MG: 5 CAPSULE ORAL at 22:20

## 2019-10-30 RX ADMIN — SODIUM CHLORIDE, PRESERVATIVE FREE 10 ML: 5 INJECTION INTRAVENOUS at 22:21

## 2019-10-30 RX ADMIN — NYSTATIN: 100000 CREAM TOPICAL at 22:21

## 2019-10-30 RX ADMIN — HEPARIN SODIUM 5000 UNITS: 5000 INJECTION INTRAVENOUS; SUBCUTANEOUS at 06:00

## 2019-10-30 RX ADMIN — SODIUM CHLORIDE 100 ML/HR: 9 INJECTION, SOLUTION INTRAVENOUS at 10:08

## 2019-10-30 RX ADMIN — SODIUM CHLORIDE, PRESERVATIVE FREE 10 ML: 5 INJECTION INTRAVENOUS at 10:19

## 2019-10-30 RX ADMIN — SEVELAMER CARBONATE 0.8 G: 0.8 POWDER, FOR SUSPENSION ORAL at 10:08

## 2019-10-30 RX ADMIN — HEPARIN SODIUM 5000 UNITS: 5000 INJECTION INTRAVENOUS; SUBCUTANEOUS at 13:22

## 2019-10-30 RX ADMIN — SODIUM CHLORIDE 100 ML/HR: 9 INJECTION, SOLUTION INTRAVENOUS at 22:26

## 2019-10-31 ENCOUNTER — OUTSIDE FACILITY SERVICE (OUTPATIENT)
Dept: INTERNAL MEDICINE | Facility: CLINIC | Age: 49
End: 2019-10-31

## 2019-10-31 LAB
ALBUMIN SERPL-MCNC: 2.4 G/DL (ref 3.5–5.2)
ALBUMIN/GLOB SERPL: 0.6 G/DL
ALP SERPL-CCNC: 274 U/L (ref 39–117)
ALT SERPL W P-5'-P-CCNC: 19 U/L (ref 1–41)
ANION GAP SERPL CALCULATED.3IONS-SCNC: 10 MMOL/L (ref 5–15)
AST SERPL-CCNC: 19 U/L (ref 1–40)
BACTERIA FLD CULT: ABNORMAL
BACTERIA SPEC AEROBE CULT: NORMAL
BACTERIA SPEC AEROBE CULT: NORMAL
BILIRUB SERPL-MCNC: 0.2 MG/DL (ref 0.2–1.2)
BUN BLD-MCNC: 8 MG/DL (ref 6–20)
BUN/CREAT SERPL: 18.2 (ref 7–25)
CALCIUM SPEC-SCNC: 8.2 MG/DL (ref 8.6–10.5)
CHLORIDE SERPL-SCNC: 101 MMOL/L (ref 98–107)
CO2 SERPL-SCNC: 24 MMOL/L (ref 22–29)
CREAT BLD-MCNC: 0.44 MG/DL (ref 0.76–1.27)
DEPRECATED RDW RBC AUTO: 44.7 FL (ref 37–54)
ERYTHROCYTE [DISTWIDTH] IN BLOOD BY AUTOMATED COUNT: 14.6 % (ref 12.3–15.4)
GFR SERPL CREATININE-BSD FRML MDRD: >150 ML/MIN/1.73
GLOBULIN UR ELPH-MCNC: 3.8 GM/DL
GLUCOSE BLD-MCNC: 114 MG/DL (ref 65–99)
GLUCOSE BLDC GLUCOMTR-MCNC: 103 MG/DL (ref 70–130)
GLUCOSE BLDC GLUCOMTR-MCNC: 125 MG/DL (ref 70–130)
GLUCOSE BLDC GLUCOMTR-MCNC: 157 MG/DL (ref 70–130)
GLUCOSE BLDC GLUCOMTR-MCNC: 78 MG/DL (ref 70–130)
GRAM STN SPEC: ABNORMAL
GRAM STN SPEC: ABNORMAL
HCT VFR BLD AUTO: 31.2 % (ref 37.5–51)
HGB BLD-MCNC: 9.5 G/DL (ref 13–17.7)
MCH RBC QN AUTO: 26.2 PG (ref 26.6–33)
MCHC RBC AUTO-ENTMCNC: 30.4 G/DL (ref 31.5–35.7)
MCV RBC AUTO: 86.2 FL (ref 79–97)
PLATELET # BLD AUTO: 308 10*3/MM3 (ref 140–450)
PMV BLD AUTO: 10.7 FL (ref 6–12)
POTASSIUM BLD-SCNC: 3.9 MMOL/L (ref 3.5–5.2)
PROT SERPL-MCNC: 6.2 G/DL (ref 6–8.5)
RBC # BLD AUTO: 3.62 10*6/MM3 (ref 4.14–5.8)
SODIUM BLD-SCNC: 135 MMOL/L (ref 136–145)
WBC NRBC COR # BLD: 12.31 10*3/MM3 (ref 3.4–10.8)

## 2019-10-31 PROCEDURE — 25010000002 HEPARIN (PORCINE) PER 1000 UNITS: Performed by: NURSE PRACTITIONER

## 2019-10-31 PROCEDURE — 97530 THERAPEUTIC ACTIVITIES: CPT

## 2019-10-31 PROCEDURE — 25010000002 FLUCONAZOLE PER 200 MG: Performed by: INTERNAL MEDICINE

## 2019-10-31 PROCEDURE — 63710000001 INSULIN DETEMIR PER 5 UNITS: Performed by: NURSE PRACTITIONER

## 2019-10-31 PROCEDURE — 85027 COMPLETE CBC AUTOMATED: CPT | Performed by: INTERNAL MEDICINE

## 2019-10-31 PROCEDURE — 99232 SBSQ HOSP IP/OBS MODERATE 35: CPT | Performed by: INTERNAL MEDICINE

## 2019-10-31 PROCEDURE — 82962 GLUCOSE BLOOD TEST: CPT

## 2019-10-31 PROCEDURE — 25010000002 PIPERACILLIN SOD-TAZOBACTAM PER 1 G: Performed by: INTERNAL MEDICINE

## 2019-10-31 PROCEDURE — G0180 MD CERTIFICATION HHA PATIENT: HCPCS | Performed by: INTERNAL MEDICINE

## 2019-10-31 PROCEDURE — 80053 COMPREHEN METABOLIC PANEL: CPT | Performed by: INTERNAL MEDICINE

## 2019-10-31 RX ADMIN — INSULIN LISPRO 2 UNITS: 100 INJECTION, SOLUTION INTRAVENOUS; SUBCUTANEOUS at 18:10

## 2019-10-31 RX ADMIN — Medication 250 MG: at 09:24

## 2019-10-31 RX ADMIN — Medication 250 MG: at 21:39

## 2019-10-31 RX ADMIN — SODIUM CHLORIDE 100 ML/HR: 9 INJECTION, SOLUTION INTRAVENOUS at 06:09

## 2019-10-31 RX ADMIN — SODIUM CHLORIDE, PRESERVATIVE FREE 10 ML: 5 INJECTION INTRAVENOUS at 21:39

## 2019-10-31 RX ADMIN — HEPARIN SODIUM 5000 UNITS: 5000 INJECTION INTRAVENOUS; SUBCUTANEOUS at 21:39

## 2019-10-31 RX ADMIN — TAZOBACTAM SODIUM AND PIPERACILLIN SODIUM 3.38 G: 375; 3 INJECTION, SOLUTION INTRAVENOUS at 01:57

## 2019-10-31 RX ADMIN — HEPARIN SODIUM 5000 UNITS: 5000 INJECTION INTRAVENOUS; SUBCUTANEOUS at 06:09

## 2019-10-31 RX ADMIN — NYSTATIN: 100000 CREAM TOPICAL at 09:27

## 2019-10-31 RX ADMIN — AMLODIPINE BESYLATE 10 MG: 10 TABLET ORAL at 09:25

## 2019-10-31 RX ADMIN — ASPIRIN 81 MG: 81 TABLET, COATED ORAL at 09:25

## 2019-10-31 RX ADMIN — METOPROLOL TARTRATE 100 MG: 100 TABLET ORAL at 21:39

## 2019-10-31 RX ADMIN — FLUCONAZOLE 200 MG: 200 INJECTION, SOLUTION INTRAVENOUS at 09:28

## 2019-10-31 RX ADMIN — SEVELAMER CARBONATE 0.8 G: 0.8 POWDER, FOR SUSPENSION ORAL at 12:39

## 2019-10-31 RX ADMIN — TAZOBACTAM SODIUM AND PIPERACILLIN SODIUM 3.38 G: 375; 3 INJECTION, SOLUTION INTRAVENOUS at 18:08

## 2019-10-31 RX ADMIN — TERAZOSIN HYDROCHLORIDE ANHYDROUS 5 MG: 5 CAPSULE ORAL at 21:39

## 2019-10-31 RX ADMIN — METOPROLOL TARTRATE 100 MG: 100 TABLET ORAL at 09:24

## 2019-10-31 RX ADMIN — INSULIN DETEMIR 25 UNITS: 100 INJECTION, SOLUTION SUBCUTANEOUS at 09:31

## 2019-10-31 RX ADMIN — SEVELAMER CARBONATE 0.8 G: 0.8 POWDER, FOR SUSPENSION ORAL at 09:27

## 2019-10-31 RX ADMIN — HEPARIN SODIUM 5000 UNITS: 5000 INJECTION INTRAVENOUS; SUBCUTANEOUS at 15:01

## 2019-10-31 RX ADMIN — DULOXETINE HYDROCHLORIDE 30 MG: 30 CAPSULE, DELAYED RELEASE ORAL at 09:28

## 2019-10-31 RX ADMIN — SEVELAMER CARBONATE 0.8 G: 0.8 POWDER, FOR SUSPENSION ORAL at 18:09

## 2019-10-31 RX ADMIN — TAZOBACTAM SODIUM AND PIPERACILLIN SODIUM 3.38 G: 375; 3 INJECTION, SOLUTION INTRAVENOUS at 09:28

## 2019-11-01 LAB
GLUCOSE BLDC GLUCOMTR-MCNC: 151 MG/DL (ref 70–130)
GLUCOSE BLDC GLUCOMTR-MCNC: 162 MG/DL (ref 70–130)
GLUCOSE BLDC GLUCOMTR-MCNC: 175 MG/DL (ref 70–130)
GLUCOSE BLDC GLUCOMTR-MCNC: 242 MG/DL (ref 70–130)
GLUCOSE BLDC GLUCOMTR-MCNC: 248 MG/DL (ref 70–130)
REF LAB TEST METHOD: NORMAL

## 2019-11-01 PROCEDURE — 25010000002 PIPERACILLIN SOD-TAZOBACTAM PER 1 G: Performed by: INTERNAL MEDICINE

## 2019-11-01 PROCEDURE — 97110 THERAPEUTIC EXERCISES: CPT

## 2019-11-01 PROCEDURE — 82962 GLUCOSE BLOOD TEST: CPT

## 2019-11-01 PROCEDURE — 99232 SBSQ HOSP IP/OBS MODERATE 35: CPT | Performed by: INTERNAL MEDICINE

## 2019-11-01 PROCEDURE — 25010000002 HEPARIN (PORCINE) PER 1000 UNITS: Performed by: NURSE PRACTITIONER

## 2019-11-01 PROCEDURE — 25010000002 FLUCONAZOLE PER 200 MG: Performed by: INTERNAL MEDICINE

## 2019-11-01 PROCEDURE — 63710000001 INSULIN DETEMIR PER 5 UNITS: Performed by: INTERNAL MEDICINE

## 2019-11-01 RX ADMIN — METOPROLOL TARTRATE 100 MG: 100 TABLET ORAL at 21:32

## 2019-11-01 RX ADMIN — NYSTATIN: 100000 CREAM TOPICAL at 09:16

## 2019-11-01 RX ADMIN — HEPARIN SODIUM 5000 UNITS: 5000 INJECTION INTRAVENOUS; SUBCUTANEOUS at 13:22

## 2019-11-01 RX ADMIN — SEVELAMER CARBONATE 0.8 G: 0.8 POWDER, FOR SUSPENSION ORAL at 09:15

## 2019-11-01 RX ADMIN — FLUCONAZOLE 200 MG: 200 INJECTION, SOLUTION INTRAVENOUS at 09:28

## 2019-11-01 RX ADMIN — INSULIN DETEMIR 22 UNITS: 100 INJECTION, SOLUTION SUBCUTANEOUS at 09:14

## 2019-11-01 RX ADMIN — HEPARIN SODIUM 5000 UNITS: 5000 INJECTION INTRAVENOUS; SUBCUTANEOUS at 21:32

## 2019-11-01 RX ADMIN — HEPARIN SODIUM 5000 UNITS: 5000 INJECTION INTRAVENOUS; SUBCUTANEOUS at 06:37

## 2019-11-01 RX ADMIN — INSULIN LISPRO 2 UNITS: 100 INJECTION, SOLUTION INTRAVENOUS; SUBCUTANEOUS at 09:14

## 2019-11-01 RX ADMIN — INSULIN LISPRO 3 UNITS: 100 INJECTION, SOLUTION INTRAVENOUS; SUBCUTANEOUS at 16:57

## 2019-11-01 RX ADMIN — TAZOBACTAM SODIUM AND PIPERACILLIN SODIUM 3.38 G: 375; 3 INJECTION, SOLUTION INTRAVENOUS at 01:23

## 2019-11-01 RX ADMIN — SEVELAMER CARBONATE 0.8 G: 0.8 POWDER, FOR SUSPENSION ORAL at 18:52

## 2019-11-01 RX ADMIN — ASPIRIN 81 MG: 81 TABLET, COATED ORAL at 09:13

## 2019-11-01 RX ADMIN — TAZOBACTAM SODIUM AND PIPERACILLIN SODIUM 3.38 G: 375; 3 INJECTION, SOLUTION INTRAVENOUS at 16:58

## 2019-11-01 RX ADMIN — SODIUM CHLORIDE, PRESERVATIVE FREE 10 ML: 5 INJECTION INTRAVENOUS at 21:32

## 2019-11-01 RX ADMIN — AMLODIPINE BESYLATE 10 MG: 10 TABLET ORAL at 09:13

## 2019-11-01 RX ADMIN — INSULIN LISPRO 2 UNITS: 100 INJECTION, SOLUTION INTRAVENOUS; SUBCUTANEOUS at 21:32

## 2019-11-01 RX ADMIN — SODIUM CHLORIDE 100 ML/HR: 9 INJECTION, SOLUTION INTRAVENOUS at 21:31

## 2019-11-01 RX ADMIN — INSULIN LISPRO 3 UNITS: 100 INJECTION, SOLUTION INTRAVENOUS; SUBCUTANEOUS at 12:02

## 2019-11-01 RX ADMIN — METOPROLOL TARTRATE 100 MG: 100 TABLET ORAL at 09:10

## 2019-11-01 RX ADMIN — SODIUM CHLORIDE, PRESERVATIVE FREE 10 ML: 5 INJECTION INTRAVENOUS at 09:32

## 2019-11-01 RX ADMIN — SODIUM CHLORIDE 100 ML/HR: 9 INJECTION, SOLUTION INTRAVENOUS at 01:23

## 2019-11-01 RX ADMIN — DULOXETINE HYDROCHLORIDE 30 MG: 30 CAPSULE, DELAYED RELEASE ORAL at 09:13

## 2019-11-01 RX ADMIN — TAZOBACTAM SODIUM AND PIPERACILLIN SODIUM 3.38 G: 375; 3 INJECTION, SOLUTION INTRAVENOUS at 09:30

## 2019-11-01 RX ADMIN — Medication 250 MG: at 21:32

## 2019-11-01 RX ADMIN — INSULIN DETEMIR 22 UNITS: 100 INJECTION, SOLUTION SUBCUTANEOUS at 21:33

## 2019-11-01 RX ADMIN — TERAZOSIN HYDROCHLORIDE ANHYDROUS 5 MG: 5 CAPSULE ORAL at 21:32

## 2019-11-01 RX ADMIN — Medication 250 MG: at 09:13

## 2019-11-01 NOTE — PLAN OF CARE
Problem: Patient Care Overview  Goal: Plan of Care Review  Outcome: Ongoing (interventions implemented as appropriate)   11/01/19 1601   Coping/Psychosocial   Plan of Care Reviewed With patient   Plan of Care Review   Progress no change   OTHER   Outcome Summary Discussed importance of progressing towards WBing through R LE and funtional transfers. Pt expressed that he had a significant fear of falling. OT discussed safety precautions and pt stated he would consider attempting a stand. ADLs: maxA. Functional Bed Mobility: Juan. Limiting Factors: self limiting behavior, strength, endurance, medical, fear. Recommended D/C: SNF. Will cont to observe and address ADL/IADL deficits as needed.

## 2019-11-01 NOTE — PROGRESS NOTES
"                  Clinical Nutrition     Reason for Visit:   LOS    Patient Name: Kendrick Crystal  YOB: 1970  MRN: [id number]  Date of Encounter: 11/01/19 3:39 PM  Admission date: 10/26/2019    Nutrition Assessment   Assessment     Admission diagnosis  Perinephric abscess    Additional diagnosis/conditions/procedures this admission  Sepsis  Diabetic ulcer RLE  RUQ pain  Medical noncompliance    Additional PMH/procedures:  HLP  HTN  PVD  Hx c.diff  DUNLAP cirrhosis  DM2  Neuropathy  Bipolar disorder  Depression    Abdominal wall I&D (4/2019)  L 4th and 5th toe amputations (1/2017)  L BKA (3/2017)  Hemorrhoidectomy (8/2019)  R testicle debridement    Reported/Observed/Food/Nutrition Related History:      Patient reports his appetite is fair. States he sometimes has difficulty chewing vegetables since he doesn't have teeth. Likes regular diet textures. Provided menu and encouraged patient to choose alternate options. Patient requesting tater tots and foods high in carbohydrates, discussed consistent carbohydrate diet with patient and OK'd baked potato with dinner tray. Patient also requesting chocolate nutritional supplement, will provide Premier Protein with low carbohydrate content.      Anthropometrics     Height: 190.5 cm (75\")  Last filed wt: Weight: 121 kg (267 lb) (11/01/19 0604)  Weight Method: Bed scale    BMI: n/a due to BKA    Ideal Body Weight (IBW) (kg): 90.45  Admission wt: 238 lb  Method obtained: stated weight per charting on admission      Labs reviewed     Results from last 7 days   Lab Units 10/31/19  0659 10/30/19  0559 10/29/19  0700 10/28/19  0639 10/27/19  0633 10/26/19  2322   GLUCOSE mg/dL 114* 109* 190*  --  199*  --    BUN mg/dL 8 9 8  --  14  --    CREATININE mg/dL 0.44* 0.55* 0.54*  --  0.55*  --    SODIUM mmol/L 135* 136 137  --  141  --    CHLORIDE mmol/L 101 99 102  --  100  --    POTASSIUM mmol/L 3.9 4.0 4.0 4.0 3.6  --    MAGNESIUM mg/dL  --   --   --  2.4 1.5* 1.5*   ALT " (SGPT) U/L 19 18  --   --  17  --      Results from last 7 days   Lab Units 10/31/19  0659 10/30/19  0559 10/27/19  0633   ALBUMIN g/dL 2.40* 2.40* 2.50*       Results from last 7 days   Lab Units 11/01/19  1136 11/01/19  0729 11/01/19  0221 10/31/19  2010 10/31/19  1603 10/31/19  1152   GLUCOSE mg/dL 248* 151* 175* 78 157* 125     Lab Results   Lab Value Date/Time    HGBA1C 14.10 (H) 10/08/2019 2008    HGBA1C 14.10 (H) 07/31/2019 0506       Medications reviewed   Pertinent: insulin, IV abx, probiotic       Current Nutrition Prescription     PO: Diet Regular; Consistent Carbohydrate  Dietary Nutrition Supplements: Premier Protein x2/d  Intake: 67% x 3 meals (10/31 - 11/1)    Nutrition Diagnosis     11/1  Problem Altered nutrition related laboratory values   Etiology ?dietary/lifestyle choices; medical noncompliance   Signs/Symptoms HgbA1c = 14.10%       Nutrition Intervention   1.  Follow treatment progress, Care plan reviewed  2.  Advise alternate selection, Interview for preferences, Menu provided   3. Preferences communicated to kitchen staff.  4. Patient requesting chocolate protein supplement - Premier Protein ordered  5. Encouraged oral / supplemental intake  6. RD notes patient with A1c of 14% and multiple previous DM educations. Will offer nutrition education as appropriate/pt allows    Goal:   General: Nutrition support treatment  PO: Continue positive intake trend      Monitoring/Evaluation:   Per protocol, PO intake, Supplement intake, Pertinent labs, Weight, Skin status    Will Continue to follow per protocol    Atyia Keys RDN, LD  Time Spent: 30 minutes

## 2019-11-01 NOTE — PLAN OF CARE
Problem: Fall Risk (Adult)  Goal: Identify Related Risk Factors and Signs and Symptoms  Outcome: Ongoing (interventions implemented as appropriate)   10/27/19 1748 11/01/19 1525   Fall Risk (Adult)   Related Risk Factors (Fall Risk) confusion/agitation;gait/mobility problems;polypharmacy;environment unfamiliar --    Signs and Symptoms (Fall Risk) --  presence of risk factors     Goal: Absence of Fall  Outcome: Outcome(s) achieved Date Met: 11/01/19      Problem: Patient Care Overview  Goal: Plan of Care Review   10/31/19 1838 11/01/19 1525   Coping/Psychosocial   Plan of Care Reviewed With --  patient   Plan of Care Review   Progress no change --    OTHER   Outcome Summary --  Pt. remained in bed, refused to work with PT. RR even and unlabored. NSR on the monitor. PICC infusing, no complications noted at this time. GALO drain output of 25cc. Pt. reports no compolaints at this time, will continue to monitor.      Goal: Interprofessional Rounds/Family Conf  Outcome: Outcome(s) achieved Date Met: 11/01/19 11/01/19 1525   Interdisciplinary Rounds/Family Conf   Participants ;dietitian/nutrition services;family;patient;physician;physical therapy;wound care specialist       Problem: Skin Injury Risk (Adult)  Intervention: Prevent/Minimize Shear/Friction Injuries   11/01/19 1400 11/01/19 1525   Positioning   Positioning/Transfer Devices --  pillows;in use   Skin Interventions   Pressure Reduction Devices specialty bed utilized --      Intervention: Prevent or Minimize Pressure   11/01/19 1525   Positioning   Body Position turned;weight shift assistance provided   Skin Interventions   Pressure Reduction Techniques frequent weight shift encouraged;weight shift assistance provided       Goal: Identify Related Risk Factors and Signs and Symptoms  Outcome: Ongoing (interventions implemented as appropriate)   11/01/19 1525   Skin Injury Risk (Adult)   Related Risk Factors (Skin Injury Risk) hospitalization  prolonged;edema;body weight extremes;infection;mobility impaired;moisture     Goal: Skin Health and Integrity  Outcome: Ongoing (interventions implemented as appropriate)   11/01/19 1525   Skin Injury Risk (Adult)   Skin Health and Integrity making progress toward outcome       Problem: Diabetes, Type 2 (Adult)  Goal: Signs and Symptoms of Listed Potential Problems Will be Absent, Minimized or Managed (Diabetes, Type 2)  Outcome: Ongoing (interventions implemented as appropriate)   11/01/19 1525   Goal/Outcome Evaluation   Problems Assessed (Type 2 Diabetes) all   Problems Present (Type 2 Diabetes) hyperglycemia  (I reinforced teaching about lifestyle modifications with pt)

## 2019-11-01 NOTE — PROGRESS NOTES
Continued Stay Note  [facility]     Patient Name: Kendrick Crystal  MRN: [id number]  Today's Date: 11/1/2019    Admit Date: 10/26/2019    Discharge Plan     Row Name 11/01/19 0907       Plan    Plan  [facility]    Patient/Family in Agreement with Plan  yes    Plan Comments  Spoke to patient at bedside. Encouraged patient to work with PT/OT, if he wants to go to [facility]. Denies any needs at this time. CM will continue to follow.    Final Discharge Disposition Code  62 - inpatient rehab facility        Discharge Codes    No documentation.       Expected Discharge Date and Time     Expected Discharge Date Expected Discharge Time    Nov 4, 2019             Fadi Valentine RN

## 2019-11-01 NOTE — PROGRESS NOTES
[facility] Progress Note        Antibiotics:  Anti-Infectives (From admission, onward)    Ordered     Dose/Rate Route Frequency Start Stop    10/30/19 0725  fluconazole (DIFLUCAN) IVPB 200 mg     Ordering Provider:  Usman Dong MD    200 mg  over 60 Minutes Intravenous Daily 10/30/19 0900 19 0859    10/28/19 0743  piperacillin-tazobactam (ZOSYN) 3.375 g in iso-osmotic dextrose 50 ml (premix)     Ordering Provider:  Usman Dong MD    3.375 g  over 4 Hours Intravenous Every 8 Hours 10/28/19 1700 19 1659    10/28/19 0743  piperacillin-tazobactam (ZOSYN) 3.375 g in iso-osmotic dextrose 50 ml (premix)     Ordering Provider:  Usman Dong MD    3.375 g  over 30 Minutes Intravenous Once 10/28/19 1100 10/28/19 1303    10/26/19 2040  meropenem (MERREM) 1 g/100 mL 0.9% NS VTB (mbp)     Ordering Provider:  Susan Infante APRN    1 g  over 30 Minutes Intravenous Once 10/26/19 2130 10/26/19 2241          CC: fatigue    HPI:  Patient is a 49 y.o.  Yr old male with history of poorly contolled T2DM, DUNLAP/liver cirrhosis, HTN, PVD/Left BKA, and multiple skin abscesses/MRSA who presented to [facility] ED with right shin wound infection summer 2018.  He was unable to get to see Dr. Martinez as his father passed away unexpectedly on 18 and he had to wait until after the .  The wound worsened becoming gangrenous and he came to [facility] ED in 2018.  He also had been hospitalized at [facility] and then transferred to  for a severe penis/scrotum infection requiring surgical debridement in summer 2018.  He received antibiotics regarding his right leg infection, generally improved over the remainder of summer 2018 but that area had not completely healed. He was admitted 2019 with acute left lower abdominal wall redness/swelling and pain, spontaneous drainage associated with fever/chills and uncontrolled blood sugars.   I&D requiring wide debridement , severe/deep  infection and transferred to [facility] on May 3 with IV Zosyn/oral doxycycline. Cultures had lactobacillus and mixed gram-positive skin sherly including alpha strep, staph epidermidis and corynebacterium. Readmitted to [facility] May 18, 2019, increasing generalized edema ultimately transitioned to oral doxycycline and healed.     He presented to the emergency room July 30, 2019 with acute rectal pain that had begun 7/27; this is associated with constipation for days, chills and nausea/dry heaving.  White blood cell count elevated, high hemoglobin A1c over 13, urinalysis with hematuria/pyuria and CT scan with 3 x 3 cm fluid collection anterior to the distal rectum and per discussion with Dr. Akbar/Jennifer, this is not in the prostate.  Colorectal surgery/urology saw him for consideration of surgical options/timing/threshold, etc..     8/2/19 I&Dper Dr Payne perirectal abscess; patient reports that he had instrumented his rectum prior to hospitalization with enema     8/3/19 urinary retention overnight requiring in/out catheterization per patient.  Creat climb     8/4/19 further creat climb; childs placed and lisinopril stopped and d/w Dr Rubio;  ?obstruction initially associated with retention postop (1200 out with I/O cath postop per nursing) -v- contrast from CT -v- lisinopril/medication/vancomycin -v- relative hypotension -v- likely combination of factors with ATN; nephrology following; vancomycin trough high and vancomycin stopped empirically 8/3 although high trough potentially accumulation as a consequence of diminishing renal function associated with retention/contrast/pressure rather than cause.  Nonetheless, avoid for now; creatinine trending down.        8/7 in retrospect he remembers air in his urine at admit which has raised concern for possible fistula from urinary tract;  No fecaluria and culture from abscess is fecal sherly;  ?rectal-urethral fistula;  Dr Payne aware     Culture with E.  "coli/Klebsiella species and creatinine generally trended down, transitioned to oral antibiotics at discharge.     Readmitted August 17, 2019 with nausea/vomiting/dry heaves for 3 days.  Walker catheter was placed and CT scan of the abdomen showed:      \"Previously seen fluid collection identified inferior to the prostate gland is more increased in density on today's examination. There is wall thickening again seen throughout the bladder. Findings again are concerning for cystitis.\" per radiology     He was transitioned to oral omnicef/topical antifungal on approx 8/23/19; Noncompliant with f/u in our office     READMITTED 10/8/19 RLQ abd pain, urinary retention, nausea, dysuria and generalized fatigue     UTI by U/A, subsequent blood cultures positive for  kleb sp, urine with kleb and e coli ;  Abnormal CT abd with right perinephric fluid collection, advanced cirrhosis, urinary bladder thickening.  10/9 Aspirate of perinephric fluid collection consistent with abscess/kleb and white blood cells; 10/16/19 cytoscopy with bullous change per Dr Gutierres but no fistula per him; 10/19/19 intermittent nausea, no vomiting or dry heaves this am, intermittent dry cough broadened to zosyn with ?aspiration pneumonia evolving after dry heaves/vomiting and had intermittent diarrhea, oral vancomycin added empirically with history of prior C. Difficile; improved with IV Zosyn/oral vancomycin and he refused consideration of placement.  He insisted on home, discharged October 23 and noncompliant with outpatient therapy and outpatient follow-up October 25.  He had become fatigued such that his family could not assist him out of bed and it was recommended by my office that he return to the emergency room October 25.  He apparently refused that but did come to the emergency room October 26.     Readmitted October 26, 2019 with generalized weakness, fatigue/malaise and nausea/vomiting/diarrhea.  CT scan revealed increased size of perinephric " "fluid collection per radiology.     On October 28, patient had reported increased diarrhea, at least 6 episodes overnight and watery/thin but no hematochezia melena or hematemesis.  Vomiting has subsided     10/29 drain placed    11/1/19 doing better, less urinary frequency with yeast in urine and diflucan added 10/30; vague abdominal discomfort that he describes as dull, intermittent, nonradiating, worse with palpation, generally better with pain meds and 3 out of 10.  No dysuria hematuria or pyuria.  Denies new hesitancy urgency or flank pain. No diarrhea     No headache photophobia or neck stiffness.  No shortness of breath cough or hemoptysis.  No fevers chills or sweats.  No rash.  No other particular exposures     ROS:      11/1/19 No f/c/s. No v/d. No rash. No new ADR to Abx.     Constitutional-- No Fever, chills or sweats.  Appetite diminished with generalized malaise and fatigue  Heent-- No new vision, hearing or throat complaints.  No epistaxis or oral sores.  Denies odynophagia or dysphagia.  No flashers, floaters or eye pain. No odynophagia or dysphagia. No headache, photophobia or neck stiffness.  CV-- No chest pain, palpitation or syncope  Resp-- No SOB/cough/Hemoptysis  GI-as above.  No hematochezia, melena, or hematemesis. Denies jaundice or chronic liver disease.  -- No dysuria, hematuria, or flank pain.  Denies hesitancy or flank pain.  Lymph- no swollen lymph nodes in neck/axilla or groin.   Heme- No active bruising or bleeding; no Hx of DVT or PE.  MS-- no swelling or pain in the bones or joints of arms/legs.  No new back pain.  Neuro-- No acute focal weakness or numbness in the arms or legs.  No seizures.     Full 12 point review of systems reviewed and negative otherwise for acute complaints, except for above      PE:   /86 (BP Location: Left arm, Patient Position: Lying)   Pulse 81   Temp 98 °F (36.7 °C) (Axillary)   Resp 14   Ht 190.5 cm (75\")   Wt 121 kg (267 lb)   SpO2 97%   " BMI 33.37 kg/m²     GENERAL: Awake and alert, in no acute distress.   HEENT: Normocephalic, atraumatic.  PERRL. EOMI. No conjunctival injection. No icterus. Oropharynx clear without evidence of thrush or exudate. No evidence of peridontal disease.    NECK: Supple without nuchal rigidity. No mass.  LYMPH: No cervical, axillary or inguinal lymphadenopathy.  HEART: RRR; No murmur, rubs, gallops.   LUNGS: Diminished at bases to auscultation bilaterally with slight rhonchi bilateral but no wheezing or rales. Normal respiratory effort. Nonlabored. No dullness.  ABDOMEN: Soft, nontender, nondistended. Positive bowel sounds. No rebound or guarding. NO mass or HSM.  EXT:  No cyanosis, clubbing or edema. No cord.  : vague erythema/sattelite lesions c/w cutaneous candidiasis  MSK: FROM without joint effusions noted arms/legs.    SKIN: Warm and dry without cutaneous eruptions on Inspection/palpation.    NEURO: Oriented to PPT. No focal deficits on motor/sensory exam at arms/legs.  PSYCHIATRIC: Normal insight and judgement. Cooperative with PE     No peripheral stigmata/phenomena of endocarditis     PICC line without obvious redness or drainage     Laboratory Data    Results from last 7 days   Lab Units 10/31/19  0659 10/30/19  0559 10/27/19  0633   WBC 10*3/mm3 12.31* 15.98* 10.64   HEMOGLOBIN g/dL 9.5* 10.0* 9.4*   HEMATOCRIT % 31.2* 32.6* 30.2*   PLATELETS 10*3/mm3 308 300 320     Results from last 7 days   Lab Units 10/31/19  0659   SODIUM mmol/L 135*   POTASSIUM mmol/L 3.9   CHLORIDE mmol/L 101   CO2 mmol/L 24.0   BUN mg/dL 8   CREATININE mg/dL 0.44*   GLUCOSE mg/dL 114*   CALCIUM mg/dL 8.2*     Results from last 7 days   Lab Units 10/31/19  0659   ALK PHOS U/L 274*   BILIRUBIN mg/dL 0.2   ALT (SGPT) U/L 19   AST (SGOT) U/L 19               Estimated Creatinine Clearance: 278.4 mL/min (A) (by C-G formula based on SCr of 0.44 mg/dL (L)).      Microbiology:      Radiology:  Imaging Results (last 72 hours)     Procedure  Component Value Units Date/Time    CT Abdomen Pelvis With Contrast [[id number]] Collected:  10/26/19 2035     Updated:  10/26/19 2037    Narrative:       CT ABDOMEN AND PELVIS WITH CONTRAST, 10/26/2019    HISTORY:  49-year-old male with recent history of a right perinephric fluid collection that underwent diagnostic needle aspiration 10/9/2019. He has been on IV antibiotic therapy for possible perinephric abscess. Examination is requested today for follow-up.    TECHNIQUE:  CT imaging of the abdomen and pelvis with IV contrast. Radiation dose reduction techniques included automated exposure control. Radiation audit for CT and nuclear cardiology exams in the last 12 months: 0.    ABDOMEN FINDINGS:  Rim-enhancing pericapsular or subcapsular fluid collection along the posterior margin of the lower pole right kidney has enlarged since 10/18/2019. It previously measured about 5.7 x 3.9 cm axially and currently measures 6.0 x 4.8 cm in the same location  (see axial image 48). There is associated perinephric soft tissue stranding, most likely inflammatory. The findings are compatible with clinically suspected perinephric abscess.    Both kidneys enhance normally. There is no evidence of upper urinary tract obstruction.    Liver, pancreas and spleen are within normal limits. No bile duct or pancreatic duct dilatation.    Small bowel and colon are normal in caliber and appearance without GI contrast. Normal appendix. No gastric distention, hiatal hernia or distal esophageal dilatation.    PELVIS FINDINGS:  Urinary bladder, prostate and rectum are within normal limits.    Lung base images show a tiny right pleural effusion.      Impression:       1.  Likely perinephric or subcapsular abscess along the posterior aspect of the lower pole right kidney. This has enlarged since 10/18/2019.  2.  Normal renal parenchymal enhancement. No CT evidence of pyelonephritis at this time. No evidence of upper urinary tract obstruction.  3.   Tiny right pleural effusion.    Signer Name: Fadi Healy MD   Signed: 10/26/2019 8:35 PM   Workstation Name: [id number]    [facility]            Impression:      --Acute Kleb septicemia/sepsis on treatment since last admit, likely urinary source/pyelnoephritis associated with urinary retention and  with abnormal CT scan with perinephric collection/abscess and Kleb on aspirate that has persisted as of aspirate 10/29 arguing relatively sequestered focus not penetrated by systemic abx and now with drain placed 10/29;  IV  Zosyn ongoing; urology to determine any timing/option or threshold for further intervention such as surgical debridement if not responsive to drain/abx, or other functional/anatomic assessment.       --Acute perinephric abscess as above;   Urology following to help guide timing/option/threshold for further drainage (perc -v- other);  Perc drain 10/29 and kleb persists in culture    --urinary frequency with cutaneous candidiasis and candiduria/pyruria;  Diflucan added 10/30     --abnormal imaging with dry cough and probable pneumonia last admission;  At risk for aspiration with recent vomiting/dry heaves and empiric zosyn started 10/19; changed to Invanz 10/23 to help facilitate home therapy with mom and changed back to Zosyn October 28;   no productive cough at present, but if it develops, then send for culture     --acute  diarrhea 10/27-10/28 with Hx CDiff per him/family and recent empiric oral vancomycin, CDT negative and oral vancomycin stopped;  If recurrent diarrhea then consider more wrolup or empiric therapy     --Hx perirectal abscess ; Colorectal surgery, Dr. Payne previously saw and is following.  Drainage as above on August 2 with mxed Fecal sherly in culture; CT scans  with no mention of abscess on 8/18 study;   any timing/option or threshold for further GI/colorectal work-up per Dr payne/CRS     --History urinary retention.  urology  following     --Hx ARF in August 2019;  ?obstruction initially associated with retention postop (1200 out with I/O cath postop per nursing) -v- contrast from CT -v- lisinopril/medication/vancomycin -v- relative hypotension -v- likely combination of factors with ATN;  vancomycin trough high and vancomycin stopped empirically 8/3 although high trough potentially accumulation as a consequence of diminishing renal function associated with retention/contrast/pressure rather than cause.  Nonetheless, avoid for now; likely further acute kidney injury at admission August 17 associated with dehydration/prerenal/ATN etiology     --History MRSA;  Not in current culture     --Diabetes mellitus type 2, uncontrolled.  He reports the reason for this is forgetfulness     --History [place] and chronic liver disease/cirrhosis  per past notes     --Left BKA     --Peripheral vascular disease     --medical noncompliance and inability to care for self at home.       PLAN:      --IV Zosyn; IV diflucan      --Check/review labs cultures and scans     --Discussed with microbiology     --History per nursing staff      --Discussed with family, partial history per them     --Highly complex set of issues with high risk for further serious morbidity and other serious sequela     --Colorectal surgery and urology have followed; urology following to determine timing/option or threshold for further percutaneous aspiration/drainage versus other surgery regarding  abscess     --D/w Dr Gordo Dong MD  11/1/2019

## 2019-11-01 NOTE — THERAPY TREATMENT NOTE
Acute Care - Occupational Therapy Treatment Note  [facility]     Patient Name: Kendrick Crystal  : 1970  MRN: [id number]  Today's Date: 2019     Date of Referral to OT: 10/27/19  Referring Physician: MD Elliot    Admit Date: 10/26/2019       ICD-10-CM ICD-9-CM   1. Perinephric abscess N15.1 590.2   2. History of sepsis Z86.19 V12.09   3. History of bacteremia Z87.898 V12.09   4. DM (diabetes mellitus) type II uncontrolled, periph vascular disorder (CMS/[facility]) E11.51 250.72    E11.65 443.81   5. Essential hypertension I10 401.9   6. S/P BKA (below knee amputation), left (CMS/HCC) Z89.512 V49.75   7. Impaired mobility and ADLs Z74.09 799.89     Patient Active Problem List   Diagnosis   • DUNLAP Cirrhosis   • Essential hypertension   • Non-compliance   • Hyperlipemia   • Hypertriglyceridemia, essential   • Sepsis affecting skin   • Type 2 diabetes mellitus with circulatory disorder and uncontrolled   • History of MRSA infection   • Diabetic ulcer of right lower leg (CMS/[facility])   • S/P BKA (below knee amputation), left (CMS/[facility])   • Peripheral vascular disease (CMS/[facility])   • DM (diabetes mellitus) type II uncontrolled, periph vascular disorder (CMS/[facility])   • Obesity (BMI 30-39.9)   • Gastroparesis   • Perinephric abscess   • Acute UTI (urinary tract infection)   • Bacteremia due to Klebsiella pneumoniae     Past Medical History:   Diagnosis Date   • Abdominal wall cellulitis 2019   • JUAN (acute kidney injury) (CMS/[facility]) 2018   • Arthritis    • Bipolar 1 disorder (CMS/[facility])    • Cellulitis of right anterior lower leg 2018    WITH MRSA   • Cirrhosis (CMS/[facility])    • Counseling for insulin pump    • Depression    • Diabetes mellitus (CMS/[facility])    • Encephalopathy, hepatic (CMS/[facility])    • H/O degenerative disc disease    • History of Lissa's gangrene     right leg/testicle   • Hyperlipemia    • Hypertension    • multiple Skin abscesses    • DUNLAP, bx showed stage IV fibrosis    • Neuropathy    • Peripheral  vascular disease (CMS/HCC)    • Thrombophlebitis      Past Surgical History:   Procedure Laterality Date   • ABDOMINAL WALL ABSCESS INCISION AND DRAINAGE N/A 4/26/2019    Procedure: ABDOMINAL WALL DEBRIDEMENT;  Surgeon: Fadi Kern MD;  Location:  [facility];  Service: General   • AMPUTATION DIGIT Left 1/30/2017    Procedure: left fourth and fifth transmetatarsal toe amputation ;  Surgeon: Juancho Martinez MD;  Location:  [facility];  Service:    • BELOW KNEE AMPUTATION Left 3/2/2017    Procedure: AMPUTATION BELOW KNEE, SHMUEL;  Surgeon: Juancho Martinez MD;  Location:  [facility];  Service:    • CYSTOSCOPY N/A 10/16/2019    Procedure: CYSTOSCOPY;  Surgeon: Brad Gutierres MD;  Location:  [facility];  Service: Urology   • ENDOSCOPY     • HEMORRHOIDECTOMY N/A 8/2/2019    Procedure: EXCISION AND DRAIN PERIRECTAL ABSCESS;  Surgeon: Mumtaz Payne MD;  Location:  [facility];  Service: General   • LEG SURGERY     • TESTICLE SURGERY Right     DEBRIDEMENT FROM GANGRENE   • TONSILLECTOMY  1975       Therapy Treatment    Rehabilitation Treatment Summary     Row Name 11/01/19 1601             Treatment Time/Intention    Discipline  occupational therapist  -KA      Document Type  therapy note (daily note)  -KA      Subjective Information  no complaints  -KA      Mode of Treatment  occupational therapy  -KA      Care Plan Review  evaluation/treatment results reviewed;care plan/treatment goals reviewed;risks/benefits reviewed;patient/other agree to care plan;current/potential barriers reviewed  -KA      Therapy Frequency (OT Eval)  daily  -KA      Patient Effort  adequate  -KA      Existing Precautions/Restrictions  fall;other (see comments) L BKA  -KA      Recorded by [KA] Roz Galvan OT 11/01/19 1622      Row Name 11/01/19 1601             Vital Signs    Pre Systolic BP Rehab  -- VSS; pt cleared for therapy w/nsg  -KA      Recorded by [KA] Roz Galvan OT 11/01/19 1622      Row Name 11/01/19 1601             Bed  Mobility Assessment/Treatment    Bed Mobility Assessment/Treatment  rolling right;scooting/bridging;supine-sit;sit-supine  -KA      Rolling Left Jasper (Bed Mobility)  supervision  -KA      Rolling Right [place] (Bed Mobility)  supervision  -KA      Scooting/Bridging Jasper (Bed Mobility)  minimum assist (75% patient effort)  -KA      Supine-Sit [place]asper (Bed Mobility)  minimum assist (75% patient effort)  -KA      Sit-Supine [place] (Bed Mobility)  supervision  -KA      Assistive Device (Bed Mobility)  bed rails  -KA      Recorded by [KA] Roz Galvan OT 11/01/19 1622      Row Name 11/01/19 1601             Transfer Assessment/Treatment    Comment (Transfers)  -- pt declined standing attempt  -KA      Recorded by [KA] Roz Galvan OT 11/01/19 1622      Row Name 11/01/19 1601             Therapeutic Exercise    Upper Extremity (Therapeutic Exercise)  bicep curl, bilateral;tricep extension, bilateral horz abd  -KA      Weight/Resistance (Therapeutic Exercise)  red  -KA      Core Strength (Therapeutic Exercise)  -- upper and lower core strengthening  -KA      Exercise Type (Therapeutic Exercise)  AROM (active range of motion)  -KA      Position (Therapeutic Exercise)  seated EOB  -KA      Sets/Reps (Therapeutic Exercise)  15/1  -KA      Recorded by [KA] Roz Galvan OT 11/01/19 1622      Row Name 11/01/19 1601             Static Sitting Balance    Level of Jasper (Unsupported Sitting, Static Balance)  supervision  -KA      Sitting Position (Unsupported Sitting, Static Balance)  sitting on edge of bed  -KA      Time Able to Maintain Position (Unsupported Sitting, Static Balance)  more than 5 minutes  -KA      Recorded by [KA] Roz Galvan OT 11/01/19 1622      Row Name 11/01/19 1601             Positioning and Restraints    Pre-Treatment Position  in bed  -KA      Post Treatment Position  bed  -KA      In Bed  fowlers;call light within reach;encouraged to call for  assist;exit alarm on;side rails up x2  -KA      Recorded by [KA] Roz Galvan OT 11/01/19 1622      Row Name 11/01/19 1601             Pain Scale: Numbers Pre/Post-Treatment    Pain Scale: Numbers, Pretreatment  0/10 - no pain  -KA      Pain Scale: Numbers, Post-Treatment  0/10 - no pain  -KA      Recorded by [KA] Roz Galvan OT 11/01/19 1622      Row Name                Wound 10/27/19 0945 Right lateral ankle Abrasion    Wound - Properties Group Date first assessed: 10/27/19 [CP] Time first assessed: 0945 [CP] Present on Hospital Admission: Y [CP] Side: Right [CP] Orientation: lateral [CP] Location: ankle [CP] Primary Wound Type: Abrasion [CP] Recorded by:  [CP] Alivia Cespedes APRN 10/27/19 1016    Row Name 11/01/19 1601             Coping    Observed Emotional State  calm;cooperative  -KA      Verbalized Emotional State  acceptance  -KA      Recorded by [KA] Roz Galvan OT 11/01/19 1622      Row Name 11/01/19 1601             Plan of Care Review    Plan of Care Reviewed With  patient  -KA      Recorded by [KA] Roz Galvan OT 11/01/19 1622      Row Name 11/01/19 1601             Outcome Summary/Treatment Plan (OT)    Daily Summary of Progress (OT)  progress toward functional goals is gradual  -KA      Anticipated Discharge Disposition (OT)  skilled nursing facility  -KA      Recorded by [KA] Roz Galvan OT 11/01/19 1622        User Key  (r) = Recorded By, (t) = Taken By, (c) = Cosigned By    Initials Name Effective Dates Discipline    CP Alivia Cespedes APRN 07/24/19 -  Nurse    KA Roz Galvan OT 07/17/19 -  OT        Wound 10/27/19 0945 Right lateral ankle Abrasion (Active)   Dressing Appearance dry;intact 11/1/2019  2:00 PM   Closure KALYN 11/1/2019  2:00 PM   Base dressing in place, unable to visualize 11/1/2019  2:00 PM   Dressing Care, Wound low-adherent;border dressing 10/31/2019  8:00 PM       Occupational Therapy Education     Title: PT OT SLP Therapies  (In Progress)     Topic: Occupational Therapy (In Progress)     Point: ADL training (In Progress)     Description: Instruct learner(s) on proper safety adaptation and remediation techniques during self care or transfers.   Instruct in proper use of assistive devices.    Learning Progress Summary           Patient Nonacceptance, E, NR by MARIA DOLORES at 10/31/2019  3:44 PM    Comment:  Educated on importance, benefits vs consequences of getting OOB and increasing therapeutic ex/activities.    Acceptance, E,D, VU,DU by CYNDI at 10/29/2019  2:30 PM    Comment:  Pt educated on HEP.                   Point: Home exercise program (Done)     Description: Instruct learner(s) on appropriate technique for monitoring, assisting and/or progressing therapeutic exercises/activities.    Learning Progress Summary           Patient Acceptance, E,D, VU,DU by CYNDI at 11/1/2019  4:01 PM    Comment:  Pt educated on role of OT, safety, fall prevention, ADLsm and HEP.    Nonacceptance, E, NR by MARIA DOLORES at 10/31/2019  3:44 PM    Comment:  Educated on importance, benefits vs consequences of getting OOB and increasing therapeutic ex/activities.    Acceptance, E,D, VU,DU by CYNDI at 10/29/2019  2:30 PM    Comment:  Pt educated on HEP.                   Point: Precautions (Done)     Description: Instruct learner(s) on prescribed precautions during self-care and functional transfers.    Learning Progress Summary           Patient Acceptance, E,D, VU,DU by CYNDI at 11/1/2019  4:01 PM    Comment:  Pt educated on role of OT, safety, fall prevention, ADLsm and HEP.    Acceptance, TB,E, VU,NR by AUSTIN at 11/1/2019 11:13 AM    Acceptance, E,D, VU,DU by CYNDI at 10/29/2019  2:30 PM    Comment:  Pt educated on HEP.   Significant Other Acceptance, TB,E, VU,NR by AUSTIN at 11/1/2019 11:13 AM                   Point: Body mechanics (Done)     Description: Instruct learner(s) on proper positioning and spine alignment during self-care, functional mobility activities and/or exercises.     Learning Progress Summary           Patient Acceptance, E,D, VU,DU by CYNDI at 11/1/2019  4:01 PM    Comment:  Pt educated on role of OT, safety, fall prevention, ADLsm and HEP.    Acceptance, TB,E, VU,NR by  at 11/1/2019 11:13 AM    Acceptance, E,D, VU,DU by CYNDI at 10/29/2019  2:30 PM    Comment:  Pt educated on HEP.   Significant Other Acceptance, TB,E, VU,NR by  at 11/1/2019 11:13 AM                               User Key     Initials Effective Dates Name Provider Type Discipline    AN 06/22/15 -  Fela Anguiano, OT Occupational Therapist OT     07/17/19 -  Roz Galvan OT Occupational Therapist OT     10/15/19 -  Stephanie Mead RN Registered Nurse Nurse                OT Recommendation and Plan  Outcome Summary/Treatment Plan (OT)  Daily Summary of Progress (OT): progress toward functional goals is gradual  Anticipated Discharge Disposition (OT): skilled nursing facility  Therapy Frequency (OT Eval): daily  Daily Summary of Progress (OT): progress toward functional goals is gradual  Plan of Care Review  Plan of Care Reviewed With: patient  Plan of Care Reviewed With: patient  Outcome Summary: Discussed expectation of pt and importance of progressing towards WBing through R LE and funtional transfers.  Pt expressed that he had a significant fear of falling.  OT discussed safety precautions and pt stated he would think about attempting a stand.   ADLs: maxA. Functional Bed Mobility: Juan.  Limiting Factors: self limiting behavior, strength, endurance, medical.  Recommended D/C: SNF.  Will cont to observe and address ADL/IADL deficits as needed.    Outcome Measures     Row Name 11/01/19 1501 10/31/19 1542          How much help from another is currently needed...    Putting on and taking off regular lower body clothing?  2  -KA  2  -AN     Bathing (including washing, rinsing, and drying)  2  -KA  2  -AN     Toileting (which includes using toilet bed pan or urinal)  2  -KA  2  -AN     Putting on and  taking off regular upper body clothing  4  -KA  4  -AN     Taking care of personal grooming (such as brushing teeth)  4  -KA  4  -AN     Eating meals  4  -KA  3  -AN     AM-PAC 6 Clicks Score (OT)  18  -KA  17  -AN       User Key  (r) = Recorded By, (t) = Taken By, (c) = Cosigned By    Initials Name Provider Type    Fela Feng OT Occupational Therapist    Roz Markham OT Occupational Therapist           Time Calculation:   Time Calculation- OT     Row Name 11/01/19 1501             Time Calculation- OT    OT Start Time  1501  -KA      OT Received On  11/01/19  -KA      OT Goal Re-Cert Due Date  11/06/19  -KA         Timed Charges    96725 - OT Therapeutic Exercise Minutes  15  -KA        User Key  (r) = Recorded By, (t) = Taken By, (c) = Cosigned By    Initials Name Provider Type    Roz Markham OT Occupational Therapist        Therapy Charges for Today     Code Description Service Date Service Provider Modifiers Qty    65249579365 HC OT THER PROC EA 15 MIN 11/1/2019 Roz Galvan OT GO 1               Roz Galvan OT  11/1/2019

## 2019-11-01 NOTE — PROGRESS NOTES
[facility] Medicine Services  PROGRESS NOTE    Patient Name: Kendrick Crystal  : 1970  MRN: [id number]    Date of Admission: 10/26/2019  Primary Care Physician: Provider, No Known    Subjective   Subjective     CC:  sepsis    HPI:  Denies back or flank pain. Has been exercising with bands in the room, but says he doesn't want to try to stand with his prosthesis.    Review of Systems  Gen- No fevers, chills  CV- No chest pain, palpitations  Resp- No cough, dyspnea  GI- No N/V/D, abd pain            Objective   Objective     Vital Signs:   Temp:  [98 °F (36.7 °C)-99.1 °F (37.3 °C)] 99.1 °F (37.3 °C)  Heart Rate:  [74-95] 83  Resp:  [14-16] 16  BP: (134-159)/() 159/89        Physical Exam:  Constitutional -no acute distress, non toxic, in bed, lying flat  HEENT-NCAT, mucous membranes moist  CV-RRR, S1 S2 normal, no m/r/g  Resp-CTAB, no wheezes, rhonchi or rales  Abd-soft, non-tender, non-distended, normo active bowel sounds, overweight  Ext-bka  Neuro-alert and oriented, speech clear, moves all extremities   Psych-normal affect   Skin- skin ulceration on lower leg just above ankle.          Results Reviewed:    Results from last 7 days   Lab Units 10/31/19  0659 10/30/19  0559 10/29/19  0845 10/27/19  0633 10/26/19  1807   WBC 10*3/mm3 12.31* 15.98*  --  10.64 12.45*   HEMOGLOBIN g/dL 9.5* 10.0*  --  9.4* 10.5*   HEMATOCRIT % 31.2* 32.6*  --  30.2* 33.5*   PLATELETS 10*3/mm3 308 300  --  320 342   INR   --   --  1.09  --   --    PROCALCITONIN ng/mL  --   --   --   --  0.12     Results from last 7 days   Lab Units 10/31/19  0659 10/30/19  0559 10/29/19  0700  10/27/19  0633   SODIUM mmol/L 135* 136 137  --  141   POTASSIUM mmol/L 3.9 4.0 4.0   < > 3.6   CHLORIDE mmol/L 101 99 102  --  100   CO2 mmol/L 24.0 28.0 29.0  --  31.0*   BUN mg/dL 8 9 8  --  14   CREATININE mg/dL 0.44* 0.55* 0.54*  --  0.55*   GLUCOSE mg/dL 114* 109* 190*  --  199*   CALCIUM mg/dL 8.2* 8.2* 7.7*  --  7.8*    ALT (SGPT) U/L 19 18  --   --  17   AST (SGOT) U/L 19 19  --   --  16    < > = values in this interval not displayed.     Estimated Creatinine Clearance: 278.4 mL/min (A) (by C-G formula based on SCr of 0.44 mg/dL (L)).    Microbiology Results Abnormal     Procedure Component Value - Date/Time    Stool Culture (Reference Lab) - Stool, Per Rectum [[id number]] Collected:  10/30/19 0507    Lab Status:  Preliminary result Specimen:  Stool from Per Rectum Updated:  11/01/19 1509     E coli, Shiga toxin Assay Negative    Narrative:       Performed at:  [address]  [id number]  : Dl Laird PhD, Phone:  [phone]    Gastrointestinal Panel, PCR ([facility]) - Stool, Per Rectum [[id number]] Collected:  10/30/19 0507    Lab Status:  Final result Specimen:  Stool from Per Rectum Updated:  11/01/19 1357     Reference Lab Report See Attached Report     Comment: See scanned report       Blood Culture - Blood, Wrist, Right [[id number]] Collected:  10/26/19 1805    Lab Status:  Final result Specimen:  Blood from Wrist, Right Updated:  10/31/19 2000     Blood Culture No growth at 5 days    Blood Culture - Blood, Hand, Right [[id number]] Collected:  10/26/19 1822    Lab Status:  Final result Specimen:  Blood from Hand, Right Updated:  10/31/19 2000     Blood Culture No growth at 5 days    Body Fluid Culture - Body Fluid, Kidney, Right [[id number]]  (Abnormal)  (Susceptibility) Collected:  10/29/19 1057    Lab Status:  Final result Specimen:  Body Fluid from Kidney, Right Updated:  10/31/19 0624     Body Fluid Culture Scant growth (1+) Klebsiella pneumoniae ssp pneumoniae     Gram Stain Moderate (3+) WBCs seen      No organisms seen    Susceptibility      Klebsiella pneumoniae ssp pneumoniae     EYAD     Ampicillin Resistant     Ampicillin + Sulbactam Susceptible     Cefazolin Susceptible     Cefepime Susceptible     Ceftazidime Susceptible     Ceftriaxone Susceptible     Gentamicin  Susceptible     Levofloxacin Susceptible     Piperacillin + Tazobactam Susceptible     Trimethoprim + Sulfamethoxazole Susceptible                    Clostridium Difficile Toxin - Stool, Per Rectum [[id number]] Collected:  10/30/19 0507    Lab Status:  Final result Specimen:  Stool from Per Rectum Updated:  10/30/19 0826    Narrative:       The following orders were created for panel order Clostridium Difficile Toxin - Stool, Per Rectum.  Procedure                               Abnormality         Status                     ---------                               -----------         ------                     Clostridium Difficile To...[[id number]]  Normal              Final result                 Please view results for these tests on the individual orders.    Clostridium Difficile Toxin, PCR - Stool, Per Rectum [[id number]]  (Normal) Collected:  10/30/19 0507    Lab Status:  Final result Specimen:  Stool from Per Rectum Updated:  10/30/19 0826     C. Difficile Toxins by PCR Not Detected    Narrative:       Performance characteristics of test not established for patients <2 years of age.  Negative for Toxigenic C. Difficile    Urine Culture - Urine, Urine, Clean Catch [[id number]]  (Abnormal) Collected:  10/28/19 1341    Lab Status:  Final result Specimen:  Urine, Clean Catch Updated:  10/29/19 1033     Urine Culture Yeast isolated          Imaging Results (Last 24 Hours)     Procedure Component Value Units Date/Time    US Abdomen Limited [[id number]] Collected:  10/31/19 0924     Updated:  11/01/19 0939    Narrative:       EXAMINATION: US ABDOMEN LIMITED- 10/30/2019     INDICATION: RUQ pain; N15.1-Renal and perinephric abscess;  Z86.19-Personal history of other infectious and parasitic diseases;  Z87.898-Personal history of other specified conditions; E11.51-Type 2  diabetes mellitus with diabetic peripheral angiopathy without gangrene;  E11.65-Type 2 diabetes mellitus with hyperglycemia; I10-Essential  (primary)  hypertension; Z89.512-Acquired absence of left leg below knee;  Z74 upper abdominal pain     TECHNIQUE: Sonographic imaging was obtained of the right upper quadrant  in both the sagittal and transverse planes.     COMPARISON: NONE     FINDINGS: Pancreas is not well seen. No gross mass or abnormal lesion  seen within the pancreatic region. The liver is heterogeneous in  appearance. There is no focal mass or intrahepatic biliary ductal  dilatation. The gallbladder reveals no evidence of stones however the  gallbladder is somewhat difficult to clear. There is no wall thickening.  No pericholecystic fluid. The common bile duct measures 5 mm. The right  kidney is normal in size, configuration, and texture measuring in length  from pole to pole 11.6 cm. There is no solid cortical mass or renal  cortical cysts seen within the right kidney. No hydronephrosis or  nephrolithiasis. Incidental small right pleural effusion.       Impression:       Heterogeneous appearance of the liver with no underlying  mass or intrahepatic biliary ductal dilatation. Incidental small right  pleural effusion. No stones in the gallbladder. The remainder of the  right upper quadrant ultrasound is unremarkable.      D:  10/31/2019  E:  10/31/2019     This report was finalized on 11/1/2019 9:36 AM by Dr. Nella Enriquez MD.             Results for orders placed during the hospital encounter of 05/18/19   Adult Transthoracic Echo Complete W/ Cont if Necessary Per Protocol    Narrative · Estimated EF = 60%.  · Mild mitral valve regurgitation is present  · Mild tricuspid valve regurgitation is present.  · Calculated right ventricular systolic pressure from tricuspid   regurgitation is 29 mmHg.          I have reviewed the medications:  Scheduled Meds:    amLODIPine 10 mg Oral Q24H   aspirin 81 mg Oral Daily   DULoxetine 30 mg Oral Daily   heparin (porcine) 5,000 Units Subcutaneous Q8H   insulin detemir 22 Units Subcutaneous Q12H   insulin lispro  0-7 Units Subcutaneous 4x Daily With Meals & Nightly   metoprolol tartrate 100 mg Oral Q12H   nystatin  Topical BID   piperacillin-tazobactam 3.375 g Intravenous Q8H   saccharomyces boulardii 250 mg Oral BID   sevelamer 800 mg Oral TID With Meals   sodium chloride 10 mL Intravenous Q12H   terazosin 5 mg Oral Nightly     Continuous Infusions:    sodium chloride 100 mL/hr Last Rate: 100 mL/hr (11/01/19 0123)     PRN Meds:.•  acetaminophen **OR** acetaminophen **OR** acetaminophen  •  dextrose  •  dextrose  •  glucagon (human recombinant)  •  magnesium sulfate **OR** magnesium sulfate **OR** magnesium sulfate  •  ondansetron  •  potassium chloride **OR** potassium chloride **OR** potassium chloride  •  Insert peripheral IV **AND** sodium chloride  •  sodium chloride      Assessment/Plan   Assessment / Plan     Active Hospital Problems    Diagnosis  POA   • Perinephric abscess [N15.1]  Yes   • Peripheral vascular disease (CMS/[facility]) [I73.9]  Yes   • S/P BKA (below knee amputation), left (CMS/[facility]) [Z89.512]  Not Applicable   • Diabetic ulcer of right lower leg (CMS/[facility]) [E11.622, L97.919]  Yes   • Type 2 diabetes mellitus with circulatory disorder and uncontrolled [E11.59]  Yes   • Hyperlipemia [E78.5]  Yes   • Essential hypertension [I10]  Yes      Resolved Hospital Problems   No resolved problems to display.        Brief Hospital Course to date:  Kendrick Crystal is a 49 male recently discharged from the hospital with a diagnosis of a perinephric abscess.  Patient was discharged on Invanz.  Today, patient presents with weakness, malaise, fatigue.  CT scan indicated the abscess if larger than previous scan 10/18/2019. ID recommends changing Invanz to meropenem 500 mg IV every 6 hours. Patient is also presenting with S/S of sepsis.         Sepsis secondary to perinephric abscess  - Klebsiella on fluid culture  - continue zosyn  - drainage from percutaneous abscess tube continues per nursing staff, bulb changed this  afternoon.  - ID and Urology follow  - check cbc am    RUQ pain  --no pain complaints today    Hypomag  ---replace per protocol    Diabetes mellitus, poorly controlled  - glucose over 24 hrs reviewed,   - continue basal bolus. Dietary indiscretion noted while inpatient making blood sugar more difficult to control  - 2 am glucose check  - check bmp am     Hypertension  -Continue amlodipine and metoprolol  -Hytrin 5 mg HS     Hx of C-diff     Depression  -continue cymbalta 30 mg daily     Wound to the right ankle  -consult wound care    Generalized weakness  - again encouraged patient to participate in therapy. Patient says he does not want to stand up in current prosthesis. Discussed with Physical Therapy.     Medical Non-compliance  - complicates care     DVT prophylaxis:  Heparin 5000 units SQ q 8 hours    Disposition: will need continued IV antibiotics.    CODE STATUS:   Code Status and Medical Interventions:   Ordered at: 10/26/19 2039     Level Of Support Discussed With:    Patient     Code Status:    CPR     Medical Interventions (Level of Support Prior to Arrest):    Full         Electronically signed by Rafy Caro MD, 11/01/19, 7:47 PM.

## 2019-11-02 LAB
ALBUMIN SERPL-MCNC: 2.5 G/DL (ref 3.5–5.2)
ALBUMIN/GLOB SERPL: 0.7 G/DL
ALP SERPL-CCNC: 218 U/L (ref 39–117)
ALT SERPL W P-5'-P-CCNC: 14 U/L (ref 1–41)
ANION GAP SERPL CALCULATED.3IONS-SCNC: 8 MMOL/L (ref 5–15)
AST SERPL-CCNC: 11 U/L (ref 1–40)
BILIRUB SERPL-MCNC: <0.2 MG/DL (ref 0.2–1.2)
BUN BLD-MCNC: 14 MG/DL (ref 6–20)
BUN/CREAT SERPL: 27.5 (ref 7–25)
CALCIUM SPEC-SCNC: 8.3 MG/DL (ref 8.6–10.5)
CHLORIDE SERPL-SCNC: 105 MMOL/L (ref 98–107)
CO2 SERPL-SCNC: 24 MMOL/L (ref 22–29)
CREAT BLD-MCNC: 0.51 MG/DL (ref 0.76–1.27)
DEPRECATED RDW RBC AUTO: 48 FL (ref 37–54)
ERYTHROCYTE [DISTWIDTH] IN BLOOD BY AUTOMATED COUNT: 15 % (ref 12.3–15.4)
GFR SERPL CREATININE-BSD FRML MDRD: >150 ML/MIN/1.73
GLOBULIN UR ELPH-MCNC: 3.6 GM/DL
GLUCOSE BLD-MCNC: 158 MG/DL (ref 65–99)
GLUCOSE BLDC GLUCOMTR-MCNC: 137 MG/DL (ref 70–130)
GLUCOSE BLDC GLUCOMTR-MCNC: 154 MG/DL (ref 70–130)
GLUCOSE BLDC GLUCOMTR-MCNC: 165 MG/DL (ref 70–130)
GLUCOSE BLDC GLUCOMTR-MCNC: 196 MG/DL (ref 70–130)
GLUCOSE BLDC GLUCOMTR-MCNC: 197 MG/DL (ref 70–130)
HCT VFR BLD AUTO: 30.2 % (ref 37.5–51)
HGB BLD-MCNC: 9 G/DL (ref 13–17.7)
MCH RBC QN AUTO: 26.8 PG (ref 26.6–33)
MCHC RBC AUTO-ENTMCNC: 29.8 G/DL (ref 31.5–35.7)
MCV RBC AUTO: 89.9 FL (ref 79–97)
PLATELET # BLD AUTO: 337 10*3/MM3 (ref 140–450)
PMV BLD AUTO: 11.3 FL (ref 6–12)
POTASSIUM BLD-SCNC: 4.3 MMOL/L (ref 3.5–5.2)
PROT SERPL-MCNC: 6.1 G/DL (ref 6–8.5)
RBC # BLD AUTO: 3.36 10*6/MM3 (ref 4.14–5.8)
SODIUM BLD-SCNC: 137 MMOL/L (ref 136–145)
WBC NRBC COR # BLD: 14.38 10*3/MM3 (ref 3.4–10.8)

## 2019-11-02 PROCEDURE — 99232 SBSQ HOSP IP/OBS MODERATE 35: CPT | Performed by: INTERNAL MEDICINE

## 2019-11-02 PROCEDURE — 85027 COMPLETE CBC AUTOMATED: CPT | Performed by: INTERNAL MEDICINE

## 2019-11-02 PROCEDURE — 63710000001 INSULIN DETEMIR PER 5 UNITS: Performed by: INTERNAL MEDICINE

## 2019-11-02 PROCEDURE — 25010000002 PIPERACILLIN SOD-TAZOBACTAM PER 1 G: Performed by: INTERNAL MEDICINE

## 2019-11-02 PROCEDURE — 25010000002 HEPARIN (PORCINE) PER 1000 UNITS: Performed by: NURSE PRACTITIONER

## 2019-11-02 PROCEDURE — 82962 GLUCOSE BLOOD TEST: CPT

## 2019-11-02 PROCEDURE — 80053 COMPREHEN METABOLIC PANEL: CPT | Performed by: INTERNAL MEDICINE

## 2019-11-02 RX ADMIN — INSULIN LISPRO 2 UNITS: 100 INJECTION, SOLUTION INTRAVENOUS; SUBCUTANEOUS at 09:54

## 2019-11-02 RX ADMIN — SODIUM CHLORIDE 100 ML/HR: 9 INJECTION, SOLUTION INTRAVENOUS at 21:54

## 2019-11-02 RX ADMIN — SEVELAMER CARBONATE 0.8 G: 0.8 POWDER, FOR SUSPENSION ORAL at 18:09

## 2019-11-02 RX ADMIN — ASPIRIN 81 MG: 81 TABLET, COATED ORAL at 09:56

## 2019-11-02 RX ADMIN — Medication 250 MG: at 09:56

## 2019-11-02 RX ADMIN — INSULIN DETEMIR 22 UNITS: 100 INJECTION, SOLUTION SUBCUTANEOUS at 11:20

## 2019-11-02 RX ADMIN — TAZOBACTAM SODIUM AND PIPERACILLIN SODIUM 3.38 G: 375; 3 INJECTION, SOLUTION INTRAVENOUS at 10:02

## 2019-11-02 RX ADMIN — SEVELAMER CARBONATE 0.8 G: 0.8 POWDER, FOR SUSPENSION ORAL at 09:56

## 2019-11-02 RX ADMIN — SODIUM CHLORIDE 100 ML/HR: 9 INJECTION, SOLUTION INTRAVENOUS at 11:51

## 2019-11-02 RX ADMIN — NYSTATIN: 100000 CREAM TOPICAL at 10:02

## 2019-11-02 RX ADMIN — SODIUM CHLORIDE, PRESERVATIVE FREE 10 ML: 5 INJECTION INTRAVENOUS at 20:24

## 2019-11-02 RX ADMIN — HEPARIN SODIUM 5000 UNITS: 5000 INJECTION INTRAVENOUS; SUBCUTANEOUS at 05:08

## 2019-11-02 RX ADMIN — METOPROLOL TARTRATE 100 MG: 100 TABLET ORAL at 09:56

## 2019-11-02 RX ADMIN — SEVELAMER CARBONATE 0.8 G: 0.8 POWDER, FOR SUSPENSION ORAL at 11:51

## 2019-11-02 RX ADMIN — TAZOBACTAM SODIUM AND PIPERACILLIN SODIUM 3.38 G: 375; 3 INJECTION, SOLUTION INTRAVENOUS at 18:09

## 2019-11-02 RX ADMIN — AMLODIPINE BESYLATE 10 MG: 10 TABLET ORAL at 09:56

## 2019-11-02 RX ADMIN — INSULIN LISPRO 2 UNITS: 100 INJECTION, SOLUTION INTRAVENOUS; SUBCUTANEOUS at 20:26

## 2019-11-02 RX ADMIN — INSULIN LISPRO 2 UNITS: 100 INJECTION, SOLUTION INTRAVENOUS; SUBCUTANEOUS at 18:09

## 2019-11-02 RX ADMIN — TERAZOSIN HYDROCHLORIDE ANHYDROUS 5 MG: 5 CAPSULE ORAL at 20:24

## 2019-11-02 RX ADMIN — DULOXETINE HYDROCHLORIDE 30 MG: 30 CAPSULE, DELAYED RELEASE ORAL at 10:00

## 2019-11-02 RX ADMIN — TAZOBACTAM SODIUM AND PIPERACILLIN SODIUM 3.38 G: 375; 3 INJECTION, SOLUTION INTRAVENOUS at 00:02

## 2019-11-02 RX ADMIN — Medication 250 MG: at 20:25

## 2019-11-02 RX ADMIN — HEPARIN SODIUM 5000 UNITS: 5000 INJECTION INTRAVENOUS; SUBCUTANEOUS at 13:51

## 2019-11-02 RX ADMIN — INSULIN DETEMIR 22 UNITS: 100 INJECTION, SOLUTION SUBCUTANEOUS at 20:27

## 2019-11-02 RX ADMIN — METOPROLOL TARTRATE 100 MG: 100 TABLET ORAL at 20:25

## 2019-11-02 NOTE — PLAN OF CARE
Problem: Fall Risk (Adult)  Goal: Identify Related Risk Factors and Signs and Symptoms  Outcome: Ongoing (interventions implemented as appropriate)   11/02/19 0337   Fall Risk (Adult)   Related Risk Factors (Fall Risk) fatigue/slow reaction;gait/mobility problems;environment unfamiliar   Signs and Symptoms (Fall Risk) presence of risk factors     Goal: Absence of Fall  Outcome: Ongoing (interventions implemented as appropriate)   11/02/19 0337   Fall Risk (Adult)   Absence of Fall achieves outcome       Problem: Patient Care Overview  Goal: Plan of Care Review  Outcome: Ongoing (interventions implemented as appropriate)   11/02/19 0337   Coping/Psychosocial   Plan of Care Reviewed With patient;spouse   Plan of Care Review   Progress no change   OTHER   Outcome Summary Pt A&O x4. VSS. RA. Pt has had no complaints during shift of pain. Wife remains at bedside during shift. Pt appears to be resting comfortably. Will continue to monitor.        Problem: Skin Injury Risk (Adult)  Goal: Identify Related Risk Factors and Signs and Symptoms  Outcome: Ongoing (interventions implemented as appropriate)   11/02/19 0337   Skin Injury Risk (Adult)   Related Risk Factors (Skin Injury Risk) body weight extremes;hospitalization prolonged;mobility impaired;moisture     Goal: Skin Health and Integrity  Outcome: Ongoing (interventions implemented as appropriate)   11/02/19 0337   Skin Injury Risk (Adult)   Skin Health and Integrity making progress toward outcome       Problem: Diabetes, Type 2 (Adult)  Goal: Signs and Symptoms of Listed Potential Problems Will be Absent, Minimized or Managed (Diabetes, Type 2)  Outcome: Ongoing (interventions implemented as appropriate)   11/02/19 0337   Goal/Outcome Evaluation   Problems Assessed (Type 2 Diabetes) all   Problems Present (Type 2 Diabetes) hyperglycemia

## 2019-11-02 NOTE — PROGRESS NOTES
[facility] Progress Note        Antibiotics:  Anti-Infectives (From admission, onward)    Ordered     Dose/Rate Route Frequency Start Stop    10/30/19 0725  fluconazole (DIFLUCAN) IVPB 200 mg     Ordering Provider:  Usman Dong MD    200 mg  over 60 Minutes Intravenous Daily 10/30/19 0900 19 1028    10/28/19 0743  piperacillin-tazobactam (ZOSYN) 3.375 g in iso-osmotic dextrose 50 ml (premix)     Ordering Provider:  Usman Dong MD    3.375 g  over 4 Hours Intravenous Every 8 Hours 10/28/19 1700 19 1659    10/28/19 0743  piperacillin-tazobactam (ZOSYN) 3.375 g in iso-osmotic dextrose 50 ml (premix)     Ordering Provider:  Usman Dong MD    3.375 g  over 30 Minutes Intravenous Once 10/28/19 1100 10/28/19 1303    10/26/19 2040  meropenem (MERREM) 1 g/100 mL 0.9% NS VTB (mbp)     Ordering Provider:  Susan Infante APRN    1 g  over 30 Minutes Intravenous Once 10/26/19 2130 10/26/19 2241          CC: fatigue    HPI:  Patient is a 49 y.o.  Yr old male with history of poorly contolled T2DM, DUNLAP/liver cirrhosis, HTN, PVD/Left BKA, and multiple skin abscesses/MRSA who presented to [facility] ED with right shin wound infection summer 2018.  He was unable to get to see Dr. Martinez as his father passed away unexpectedly on 18 and he had to wait until after the .  The wound worsened becoming gangrenous and he came to [facility] ED in 2018.  He also had been hospitalized at [facility] and then transferred to  for a severe penis/scrotum infection requiring surgical debridement in summer 2018.  He received antibiotics regarding his right leg infection, generally improved over the remainder of summer 2018 but that area had not completely healed. He was admitted 2019 with acute left lower abdominal wall redness/swelling and pain, spontaneous drainage associated with fever/chills and uncontrolled blood sugars.   I&D requiring wide debridement , severe/deep  infection and transferred to [facility] on May 3 with IV Zosyn/oral doxycycline. Cultures had lactobacillus and mixed gram-positive skin sherly including alpha strep, staph epidermidis and corynebacterium. Readmitted to [facility] May 18, 2019, increasing generalized edema ultimately transitioned to oral doxycycline and healed.     He presented to the emergency room July 30, 2019 with acute rectal pain that had begun 7/27; this is associated with constipation for days, chills and nausea/dry heaving.  White blood cell count elevated, high hemoglobin A1c over 13, urinalysis with hematuria/pyuria and CT scan with 3 x 3 cm fluid collection anterior to the distal rectum and per discussion with Dr. Akbar/Jennifer, this is not in the prostate.  Colorectal surgery/urology saw him for consideration of surgical options/timing/threshold, etc..     8/2/19 I&Dper Dr Payne perirectal abscess; patient reports that he had instrumented his rectum prior to hospitalization with enema     8/3/19 urinary retention overnight requiring in/out catheterization per patient.  Creat climb     8/4/19 further creat climb; childs placed and lisinopril stopped and d/w Dr Rubio;  ?obstruction initially associated with retention postop (1200 out with I/O cath postop per nursing) -v- contrast from CT -v- lisinopril/medication/vancomycin -v- relative hypotension -v- likely combination of factors with ATN; nephrology following; vancomycin trough high and vancomycin stopped empirically 8/3 although high trough potentially accumulation as a consequence of diminishing renal function associated with retention/contrast/pressure rather than cause.  Nonetheless, avoid for now; creatinine trending down.        8/7 in retrospect he remembers air in his urine at admit which has raised concern for possible fistula from urinary tract;  No fecaluria and culture from abscess is fecal sherly;  ?rectal-urethral fistula;  Dr Payne aware     Culture with E.  "coli/Klebsiella species and creatinine generally trended down, transitioned to oral antibiotics at discharge.     Readmitted August 17, 2019 with nausea/vomiting/dry heaves for 3 days.  Walker catheter was placed and CT scan of the abdomen showed:      \"Previously seen fluid collection identified inferior to the prostate gland is more increased in density on today's examination. There is wall thickening again seen throughout the bladder. Findings again are concerning for cystitis.\" per radiology     He was transitioned to oral omnicef/topical antifungal on approx 8/23/19; Noncompliant with f/u in our office     READMITTED 10/8/19 RLQ abd pain, urinary retention, nausea, dysuria and generalized fatigue     UTI by U/A, subsequent blood cultures positive for  kleb sp, urine with kleb and e coli ;  Abnormal CT abd with right perinephric fluid collection, advanced cirrhosis, urinary bladder thickening.  10/9 Aspirate of perinephric fluid collection consistent with abscess/kleb and white blood cells; 10/16/19 cytoscopy with bullous change per Dr Gutierres but no fistula per him; 10/19/19 intermittent nausea, no vomiting or dry heaves this am, intermittent dry cough broadened to zosyn with ?aspiration pneumonia evolving after dry heaves/vomiting and had intermittent diarrhea, oral vancomycin added empirically with history of prior C. Difficile; improved with IV Zosyn/oral vancomycin and he refused consideration of placement.  He insisted on home, discharged October 23 and noncompliant with outpatient therapy and outpatient follow-up October 25.  He had become fatigued such that his family could not assist him out of bed and it was recommended by my office that he return to the emergency room October 25.  He apparently refused that but did come to the emergency room October 26.     Readmitted October 26, 2019 with generalized weakness, fatigue/malaise and nausea/vomiting/diarrhea.  CT scan revealed increased size of perinephric " fluid collection per radiology.     On October 28, patient had reported increased diarrhea, at least 6 episodes overnight and watery/thin but no hematochezia melena or hematemesis.  Vomiting has subsided     10/29 drain placed    11/2/19 intermittent diarrhea but consistency varies and not always diarrhea per family, less urinary frequency with yeast in urine and diflucan added 10/30; vague abdominal discomfort that he describes as dull, intermittent, nonradiating, worse with palpation, generally better with pain meds and 3 out of 10.  No dysuria hematuria or pyuria.  Denies new hesitancy urgency or flank pain. No diarrhea     No headache photophobia or neck stiffness.  No shortness of breath cough or hemoptysis.  No fevers chills or sweats.  No rash.  No other particular exposures     ROS:      11/2/19 No f/c/s. No vomiting. No rash. No new ADR to Abx.     Constitutional-- No Fever, chills or sweats.  Appetite diminished with generalized malaise and fatigue  Heent-- No new vision, hearing or throat complaints.  No epistaxis or oral sores.  Denies odynophagia or dysphagia.  No flashers, floaters or eye pain. No odynophagia or dysphagia. No headache, photophobia or neck stiffness.  CV-- No chest pain, palpitation or syncope  Resp-- No SOB/cough/Hemoptysis  GI-as above.  No hematochezia, melena, or hematemesis. Denies jaundice or chronic liver disease.  -- No dysuria, hematuria, or flank pain.  Denies hesitancy or flank pain.  Lymph- no swollen lymph nodes in neck/axilla or groin.   Heme- No active bruising or bleeding; no Hx of DVT or PE.  MS-- no swelling or pain in the bones or joints of arms/legs.  No new back pain.  Neuro-- No acute focal weakness or numbness in the arms or legs.  No seizures.     Full 12 point review of systems reviewed and negative otherwise for acute complaints, except for above      PE:   /73 (BP Location: Left arm, Patient Position: Lying)   Pulse 87   Temp 97.3 °F (36.3 °C)  "(Axillary)   Resp 18   Ht 190.5 cm (75\")   Wt 121 kg (267 lb)   SpO2 98%   BMI 33.37 kg/m²     GENERAL: Awake and alert, in no acute distress.   HEENT: Normocephalic, atraumatic.  PERRL. EOMI. No conjunctival injection. No icterus. Oropharynx clear without evidence of thrush or exudate. No evidence of peridontal disease.    NECK: Supple without nuchal rigidity. No mass.  LYMPH: No cervical, axillary or inguinal lymphadenopathy.  HEART: RRR; No murmur, rubs, gallops.   LUNGS: Diminished at bases to auscultation bilaterally with slight rhonchi bilateral but no wheezing or rales. Normal respiratory effort. Nonlabored. No dullness.  ABDOMEN: Soft, nontender, nondistended. Positive bowel sounds. No rebound or guarding. NO mass or HSM.  EXT:  No cyanosis, clubbing or edema. No cord.  : vague erythema/sattelite lesions c/w cutaneous candidiasis  MSK: FROM without joint effusions noted arms/legs.    SKIN: Warm and dry without cutaneous eruptions on Inspection/palpation.    NEURO: Oriented to PPT. No focal deficits on motor/sensory exam at arms/legs.     No peripheral stigmata/phenomena of endocarditis     PICC line without obvious redness or drainage     Laboratory Data    Results from last 7 days   Lab Units 10/31/19  0659 10/30/19  0559 10/27/19  0633   WBC 10*3/mm3 12.31* 15.98* 10.64   HEMOGLOBIN g/dL 9.5* 10.0* 9.4*   HEMATOCRIT % 31.2* 32.6* 30.2*   PLATELETS 10*3/mm3 308 300 320     Results from last 7 days   Lab Units 10/31/19  0659   SODIUM mmol/L 135*   POTASSIUM mmol/L 3.9   CHLORIDE mmol/L 101   CO2 mmol/L 24.0   BUN mg/dL 8   CREATININE mg/dL 0.44*   GLUCOSE mg/dL 114*   CALCIUM mg/dL 8.2*     Results from last 7 days   Lab Units 10/31/19  0659   ALK PHOS U/L 274*   BILIRUBIN mg/dL 0.2   ALT (SGPT) U/L 19   AST (SGOT) U/L 19               Estimated Creatinine Clearance: 278.4 mL/min (A) (by C-G formula based on SCr of 0.44 mg/dL (L)).      Microbiology:      Radiology:  Imaging Results (last 72 hours)  "    Procedure Component Value Units Date/Time    CT Abdomen Pelvis With Contrast [[id number]] Collected:  10/26/19 2035     Updated:  10/26/19 2037    Narrative:       CT ABDOMEN AND PELVIS WITH CONTRAST, 10/26/2019    HISTORY:  49-year-old male with recent history of a right perinephric fluid collection that underwent diagnostic needle aspiration 10/9/2019. He has been on IV antibiotic therapy for possible perinephric abscess. Examination is requested today for follow-up.    TECHNIQUE:  CT imaging of the abdomen and pelvis with IV contrast. Radiation dose reduction techniques included automated exposure control. Radiation audit for CT and nuclear cardiology exams in the last 12 months: 0.    ABDOMEN FINDINGS:  Rim-enhancing pericapsular or subcapsular fluid collection along the posterior margin of the lower pole right kidney has enlarged since 10/18/2019. It previously measured about 5.7 x 3.9 cm axially and currently measures 6.0 x 4.8 cm in the same location  (see axial image 48). There is associated perinephric soft tissue stranding, most likely inflammatory. The findings are compatible with clinically suspected perinephric abscess.    Both kidneys enhance normally. There is no evidence of upper urinary tract obstruction.    Liver, pancreas and spleen are within normal limits. No bile duct or pancreatic duct dilatation.    Small bowel and colon are normal in caliber and appearance without GI contrast. Normal appendix. No gastric distention, hiatal hernia or distal esophageal dilatation.    PELVIS FINDINGS:  Urinary bladder, prostate and rectum are within normal limits.    Lung base images show a tiny right pleural effusion.      Impression:       1.  Likely perinephric or subcapsular abscess along the posterior aspect of the lower pole right kidney. This has enlarged since 10/18/2019.  2.  Normal renal parenchymal enhancement. No CT evidence of pyelonephritis at this time. No evidence of upper urinary tract  obstruction.  3.  Tiny right pleural effusion.    Signer Name: Fadi Healy MD   Signed: 10/26/2019 8:35 PM   Workstation Name: [id number]    [facility]            Impression:      --Acute Kleb septicemia/sepsis on treatment since last admit, likely urinary source/pyelnoephritis associated with urinary retention and  with abnormal CT scan with perinephric collection/abscess and Kleb on aspirate that has persisted as of aspirate 10/29 arguing relatively sequestered focus not penetrated by systemic abx and now with drain placed 10/29;  IV  Zosyn ongoing; urology to determine any timing/option or threshold for further intervention such as surgical debridement if not responsive to drain/abx, or other functional/anatomic assessment.       --Acute perinephric abscess as above;   Urology following to help guide timing/option/threshold for further drainage (perc -v- other);  Perc drain 10/29 and kleb persists in culture    --urinary frequency with cutaneous candidiasis and candiduria/pyruria;  Diflucan added 10/30     --abnormal imaging with dry cough and probable pneumonia last admission;  At risk for aspiration with recent vomiting/dry heaves and empiric zosyn started 10/19; changed to Invanz 10/23 to help facilitate home therapy with mom and changed back to Zosyn October 28;   no productive cough at present, but if it develops, then send for culture     --acute  diarrhea 10/27-10/28 with Hx CDiff per him/family and recent empiric oral vancomycin, CDT negative and oral vancomycin stopped;  If recurrent diarrhea then consider more wrolup or empiric therapy     --Hx perirectal abscess ; Colorectal surgery, Dr. Payne previously saw and is following.  Drainage as above on August 2 with mxed Fecal sherly in culture; CT scans  with no mention of abscess on 8/18 study;   any timing/option or threshold for further GI/colorectal work-up per Dr payne/CRS     --History urinary retention.  urology  following     --Hx ARF in August 2019;  ?obstruction initially associated with retention postop (1200 out with I/O cath postop per nursing) -v- contrast from CT -v- lisinopril/medication/vancomycin -v- relative hypotension -v- likely combination of factors with ATN;  vancomycin trough high and vancomycin stopped empirically 8/3 although high trough potentially accumulation as a consequence of diminishing renal function associated with retention/contrast/pressure rather than cause.  Nonetheless, avoid for now; likely further acute kidney injury at admission August 17 associated with dehydration/prerenal/ATN etiology     --History MRSA;  Not in current culture     --Diabetes mellitus type 2, uncontrolled.  He reports the reason for this is forgetfulness     --History Johnston and chronic liver disease/cirrhosis  per past notes     --Left BKA     --Peripheral vascular disease     --medical noncompliance and inability to care for self at home.       PLAN:      --IV Zosyn; IV diflucan      --Check/review labs cultures and scans     --Discussed with microbiology     --History per nursing staff      --Discussed with family, partial history per them     --Highly complex set of issues with high risk for further serious morbidity and other serious sequela     --Colorectal surgery and urology have followed; urology following to determine timing/option or threshold for further percutaneous aspiration/drainage versus other surgery regarding  abscess     --D/w Dr Gordo Dong MD  11/2/2019

## 2019-11-02 NOTE — PROGRESS NOTES
"Daily Progress Note    Patient: Kendrick Crystal  [facility] Day: 7    Subjective: Pt resting comfortably. Still with diarrhea.   Drain draining.       Objective:     /73 (BP Location: Left arm, Patient Position: Lying)   Pulse 86   Temp 97.3 °F (36.3 °C) (Axillary)   Resp 18   Ht 190.5 cm (75\")   Wt 121 kg (267 lb)   SpO2 98%   BMI 33.37 kg/m²       Intake/Output Summary (Last 24 hours) at 11/2/2019 1017  Last data filed at 11/2/2019 0600  Gross per 24 hour   Intake 3054 ml   Output 1700 ml   Net 1354 ml       Review of Systems:    Physical Exam:   General Appearance: alert, appears stated age and cooperative  Head: normocephalic, without obvious abnormality and atraumatic  Lungs respirations regular  Abdomen no masses and soft non tender    Extremities moves extremities well, no edema, no cyanosis and no redness      Lab Results (last 24 hours)     Procedure Component Value Units Date/Time    CBC (No Diff) [[id number]]  (Abnormal) Collected:  11/02/19 0841    Specimen:  Blood Updated:  11/02/19 0959     WBC 14.38 10*3/mm3      RBC 3.36 10*6/mm3      Hemoglobin 9.0 g/dL      Hematocrit 30.2 %      MCV 89.9 fL      MCH 26.8 pg      MCHC 29.8 g/dL      RDW 15.0 %      RDW-SD 48.0 fl      MPV 11.3 fL      Platelets 337 10*3/mm3     Comprehensive Metabolic Panel [[id number]] Collected:  11/02/19 0841    Specimen:  Blood Updated:  11/02/19 0943    POC Glucose Once [[id number]]  (Abnormal) Collected:  11/02/19 0758    Specimen:  Blood Updated:  11/02/19 0801     Glucose 165 mg/dL     POC Glucose Once [[id number]]  (Abnormal) Collected:  11/02/19 0511    Specimen:  Blood Updated:  11/02/19 0512     Glucose 197 mg/dL     POC Glucose Once [[id number]]  (Abnormal) Collected:  11/01/19 1945    Specimen:  Blood Updated:  11/01/19 1947     Glucose 162 mg/dL     POC Glucose Once [[id number]]  (Abnormal) Collected:  11/01/19 1629    Specimen:  Blood Updated:  11/01/19 1632     Glucose 242 mg/dL     Stool Culture (Reference " Lab) - Stool, Per Rectum [[id number]] Collected:  10/30/19 0507    Specimen:  Stool from Per Rectum Updated:  11/01/19 1509     E coli, Shiga toxin Assay Negative    Narrative:       Performed at:  [address] [place] [address]  [id number]  : Dl Laird PhD, Phone:  [phone]    Gastrointestinal Panel, PCR ([facility]) - Stool, Per Rectum [[id number]] Collected:  10/30/19 0507    Specimen:  Stool from Per Rectum Updated:  11/01/19 1357     Reference Lab Report See Attached Report     Comment: See scanned report       POC Glucose Once [[id number]]  (Abnormal) Collected:  11/01/19 1136    Specimen:  Blood Updated:  11/01/19 1143     Glucose 248 mg/dL           Imaging Results (Last 24 Hours)     ** No results found for the last 24 hours. **          Assessment/Plan: R claudia-renal abscess.  Cx with scant Kleb.   On abx per ID.  Do not anticipate surgical debridement in this confined and small collection.  Pt remains asymptomatic re: flank pain.         Patient Active Problem List   Diagnosis   • DUNLAP Cirrhosis   • Essential hypertension   • Non-compliance   • Hyperlipemia   • Hypertriglyceridemia, essential   • Sepsis affecting skin   • Type 2 diabetes mellitus with circulatory disorder and uncontrolled   • History of MRSA infection   • Diabetic ulcer of right lower leg (CMS/HCC)   • S/P BKA (below knee amputation), left (CMS/HCC)   • Peripheral vascular disease (CMS/[facility])   • DM (diabetes mellitus) type II uncontrolled, periph vascular disorder (CMS/[facility])   • Obesity (BMI 30-39.9)   • Gastroparesis   • Perinephric abscess   • Acute UTI (urinary tract infection)   • Bacteremia due to Klebsiella pneumoniae        Diagnosis Plan   1. Perinephric abscess     2. History of sepsis     3. History of bacteremia     4. DM (diabetes mellitus) type II uncontrolled, periph vascular disorder (CMS/[facility])     5. Essential hypertension     6. S/P BKA (below knee amputation), left (CMS/[facility])     7.  Impaired mobility and ADLs           Brad Gutierres MD - 11/2/2019, 10:17 AM

## 2019-11-02 NOTE — PROGRESS NOTES
[facility] Medicine Services  PROGRESS NOTE    Patient Name: Kendrick Crystal  : 1970  MRN: [id number]    Date of Admission: 10/26/2019  Primary Care Physician: Provider, No Known    Subjective   Subjective     CC:  sepsis    HPI:  No current back or flank pain. Eating well. Some loose stool overnight    Review of Systems  Gen- No fevers, chills  CV- No chest pain, palpitations  Resp- No cough, dyspnea  GI- No N/V/D, abd pain                Objective   Objective     Vital Signs:   Temp:  [97.3 °F (36.3 °C)-98.7 °F (37.1 °C)] 98.7 °F (37.1 °C)  Heart Rate:  [78-92] 78  Resp:  [16-18] 16  BP: (130-168)/() 165/96        Physical Exam:  Constitutional -no acute distress, non toxic, in bed  HEENT-NCAT, mucous membranes moist  CV-RRR, S1 S2 normal, no m/r/g  Resp-CTAB, no wheezes, rhonchi or rales  Abd-soft, non-tender, non-distended, normo active bowel sounds, obese  - abscess drain right back with serosanguinous and tan fluid  Ext-bka  Neuro-alert and oriented, speech clear, moves all extremities   Psych-normal affect   Skin- No rash on exposed UE or LE bilaterally            Results Reviewed:    Results from last 7 days   Lab Units 11/02/19  0841 10/31/19  0659 10/30/19  0559 10/29/19  0845   WBC 10*3/mm3 14.38* 12.31* 15.98*  --    HEMOGLOBIN g/dL 9.0* 9.5* 10.0*  --    HEMATOCRIT % 30.2* 31.2* 32.6*  --    PLATELETS 10*3/mm3 337 308 300  --    INR   --   --   --  1.09     Results from last 7 days   Lab Units 1941 10/31/19  0659 10/30/19  0559   SODIUM mmol/L 137 135* 136   POTASSIUM mmol/L 4.3 3.9 4.0   CHLORIDE mmol/L 105 101 99   CO2 mmol/L 24.0 24.0 28.0   BUN mg/dL 14 8 9   CREATININE mg/dL 0.51* 0.44* 0.55*   GLUCOSE mg/dL 158* 114* 109*   CALCIUM mg/dL 8.3* 8.2* 8.2*   ALT (SGPT) U/L 14 19 18   AST (SGOT) U/L 11 19 19     Estimated Creatinine Clearance: 240.2 mL/min (A) (by C-G formula based on SCr of 0.51 mg/dL (L)).    Microbiology Results Abnormal      Procedure Component Value - Date/Time    Stool Culture (Reference Lab) - Stool, Per Rectum [[id number]] Collected:  10/30/19 0507    Lab Status:  Preliminary result Specimen:  Stool from Per Rectum Updated:  11/02/19 1307     Salmonella/Shigella Screen Final report     Result 1 Comment     Comment: No Salmonella or Shigella recovered.        E coli, Shiga toxin Assay Negative    Narrative:       Performed at:  [address]  [id number]  : Dl Laird PhD, Phone:  [phone]    Gastrointestinal Panel, PCR ([facility]) - Stool, Per Rectum [[id number]] Collected:  10/30/19 0507    Lab Status:  Final result Specimen:  Stool from Per Rectum Updated:  11/01/19 1357     Reference Lab Report See Attached Report     Comment: See scanned report       Blood Culture - Blood, Wrist, Right [[id number]] Collected:  10/26/19 1805    Lab Status:  Final result Specimen:  Blood from Wrist, Right Updated:  10/31/19 2000     Blood Culture No growth at 5 days    Blood Culture - Blood, Hand, Right [[id number]] Collected:  10/26/19 1822    Lab Status:  Final result Specimen:  Blood from Hand, Right Updated:  10/31/19 2000     Blood Culture No growth at 5 days    Body Fluid Culture - Body Fluid, Kidney, Right [[id number]]  (Abnormal)  (Susceptibility) Collected:  10/29/19 1057    Lab Status:  Final result Specimen:  Body Fluid from Kidney, Right Updated:  10/31/19 0624     Body Fluid Culture Scant growth (1+) Klebsiella pneumoniae ssp pneumoniae     Gram Stain Moderate (3+) WBCs seen      No organisms seen    Susceptibility      Klebsiella pneumoniae ssp pneumoniae     EYAD     Ampicillin Resistant     Ampicillin + Sulbactam Susceptible     Cefazolin Susceptible     Cefepime Susceptible     Ceftazidime Susceptible     Ceftriaxone Susceptible     Gentamicin Susceptible     Levofloxacin Susceptible     Piperacillin + Tazobactam Susceptible     Trimethoprim + Sulfamethoxazole Susceptible                     Clostridium Difficile Toxin - Stool, Per Rectum [[id number]] Collected:  10/30/19 0507    Lab Status:  Final result Specimen:  Stool from Per Rectum Updated:  10/30/19 0826    Narrative:       The following orders were created for panel order Clostridium Difficile Toxin - Stool, Per Rectum.  Procedure                               Abnormality         Status                     ---------                               -----------         ------                     Clostridium Difficile To...[[id number]]  Normal              Final result                 Please view results for these tests on the individual orders.    Clostridium Difficile Toxin, PCR - Stool, Per Rectum [[id number]]  (Normal) Collected:  10/30/19 0507    Lab Status:  Final result Specimen:  Stool from Per Rectum Updated:  10/30/19 0826     C. Difficile Toxins by PCR Not Detected    Narrative:       Performance characteristics of test not established for patients <2 years of age.  Negative for Toxigenic C. Difficile    Urine Culture - Urine, Urine, Clean Catch [[id number]]  (Abnormal) Collected:  10/28/19 1341    Lab Status:  Final result Specimen:  Urine, Clean Catch Updated:  10/29/19 1033     Urine Culture Yeast isolated          Imaging Results (Last 24 Hours)     ** No results found for the last 24 hours. **          Results for orders placed during the hospital encounter of 05/18/19   Adult Transthoracic Echo Complete W/ Cont if Necessary Per Protocol    Narrative · Estimated EF = 60%.  · Mild mitral valve regurgitation is present  · Mild tricuspid valve regurgitation is present.  · Calculated right ventricular systolic pressure from tricuspid   regurgitation is 29 mmHg.          I have reviewed the medications:  Scheduled Meds:    amLODIPine 10 mg Oral Q24H   aspirin 81 mg Oral Daily   DULoxetine 30 mg Oral Daily   heparin (porcine) 5,000 Units Subcutaneous Q8H   insulin detemir 22 Units Subcutaneous Q12H   insulin lispro 0-7 Units  Subcutaneous 4x Daily With Meals & Nightly   metoprolol tartrate 100 mg Oral Q12H   nystatin  Topical BID   piperacillin-tazobactam 3.375 g Intravenous Q8H   saccharomyces boulardii 250 mg Oral BID   sevelamer 800 mg Oral TID With Meals   sodium chloride 10 mL Intravenous Q12H   terazosin 5 mg Oral Nightly     Continuous Infusions:    sodium chloride 100 mL/hr Last Rate: 100 mL/hr (11/02/19 1151)     PRN Meds:.•  acetaminophen **OR** acetaminophen **OR** acetaminophen  •  dextrose  •  dextrose  •  glucagon (human recombinant)  •  magnesium sulfate **OR** magnesium sulfate **OR** magnesium sulfate  •  ondansetron  •  potassium chloride **OR** potassium chloride **OR** potassium chloride  •  Insert peripheral IV **AND** sodium chloride  •  sodium chloride      Assessment/Plan   Assessment / Plan     Active Hospital Problems    Diagnosis  POA   • Perinephric abscess [N15.1]  Yes   • Peripheral vascular disease (CMS/[facility]) [I73.9]  Yes   • S/P BKA (below knee amputation), left (CMS/[facility]) [Z89.512]  Not Applicable   • Diabetic ulcer of right lower leg (CMS/[facility]) [E11.622, L97.919]  Yes   • Type 2 diabetes mellitus with circulatory disorder and uncontrolled [E11.59]  Yes   • Hyperlipemia [E78.5]  Yes   • Essential hypertension [I10]  Yes      Resolved Hospital Problems   No resolved problems to display.        Brief Hospital Course to date:  Kendrick Crystal is a 49 male recently discharged from the hospital with a diagnosis of a perinephric abscess.  Patient was discharged on Invanz.  Today, patient presents with weakness, malaise, fatigue.  CT scan indicated the abscess if larger than previous scan 10/18/2019. ID recommends changing Invanz to meropenem 500 mg IV every 6 hours. Patient is also presenting with S/S of sepsis.         Sepsis secondary to perinephric abscess  - klebsiella, continue zosyn  - discussed abscess drain care with Urology Dr Gutierres  - WBC count 14, increased today, repeat cbc am  - monitor loose  stool    RUQ pain  --no pain complaints today    Hypomag  ---replace per protocol    Diabetes mellitus, poorly controlled  - glucose over 24 hrs reviewed, 137-197  - continue basal bolus. Dietary indiscretion noted while inpatient making blood sugar more difficult to control     Hypertension  -Continue amlodipine and metoprolol  -Hytrin 5 mg HS     Hx of C-diff     Depression  -continue cymbalta 30 mg daily     Wound to the right ankle  -consult wound care    Generalized weakness  - encouraged participation with PT    Medical Non-compliance  - complicates care     DVT prophylaxis:  Heparin 5000 units SQ q 8 hours    Disposition: will need continued IV antibiotics.    CODE STATUS:   Code Status and Medical Interventions:   Ordered at: 10/26/19 2039     Level Of Support Discussed With:    Patient     Code Status:    CPR     Medical Interventions (Level of Support Prior to Arrest):    Full         Electronically signed by Rafy Caro MD, 11/02/19, 7:42 PM.

## 2019-11-02 NOTE — PLAN OF CARE
Problem: Patient Care Overview  Goal: Plan of Care Review  Outcome: Ongoing (interventions implemented as appropriate)   11/02/19 1901   Coping/Psychosocial   Plan of Care Reviewed With patient;spouse   Plan of Care Review   Progress no change   OTHER   Outcome Summary pt remains a/o x4, room air, NSR, VSS. no c/o pain today. GALO drained 10 mL's of serosanginous fluid during shift. pt refused to turn throughout shift. spouse remained at bedside.

## 2019-11-03 LAB
ALBUMIN SERPL-MCNC: 2.7 G/DL (ref 3.5–5.2)
ALBUMIN/GLOB SERPL: 0.7 G/DL
ALP SERPL-CCNC: 232 U/L (ref 39–117)
ALT SERPL W P-5'-P-CCNC: 17 U/L (ref 1–41)
ANION GAP SERPL CALCULATED.3IONS-SCNC: 11 MMOL/L (ref 5–15)
AST SERPL-CCNC: 20 U/L (ref 1–40)
BILIRUB SERPL-MCNC: <0.2 MG/DL (ref 0.2–1.2)
BUN BLD-MCNC: 11 MG/DL (ref 6–20)
BUN/CREAT SERPL: 24.4 (ref 7–25)
CALCIUM SPEC-SCNC: 8.6 MG/DL (ref 8.6–10.5)
CHLORIDE SERPL-SCNC: 102 MMOL/L (ref 98–107)
CO2 SERPL-SCNC: 25 MMOL/L (ref 22–29)
CREAT BLD-MCNC: 0.45 MG/DL (ref 0.76–1.27)
DEPRECATED RDW RBC AUTO: 47 FL (ref 37–54)
ERYTHROCYTE [DISTWIDTH] IN BLOOD BY AUTOMATED COUNT: 15.2 % (ref 12.3–15.4)
GFR SERPL CREATININE-BSD FRML MDRD: >150 ML/MIN/1.73
GLOBULIN UR ELPH-MCNC: 3.9 GM/DL
GLUCOSE BLD-MCNC: 227 MG/DL (ref 65–99)
GLUCOSE BLDC GLUCOMTR-MCNC: 108 MG/DL (ref 70–130)
GLUCOSE BLDC GLUCOMTR-MCNC: 138 MG/DL (ref 70–130)
GLUCOSE BLDC GLUCOMTR-MCNC: 163 MG/DL (ref 70–130)
GLUCOSE BLDC GLUCOMTR-MCNC: 198 MG/DL (ref 70–130)
HCT VFR BLD AUTO: 29.5 % (ref 37.5–51)
HGB BLD-MCNC: 9.2 G/DL (ref 13–17.7)
MCH RBC QN AUTO: 26.9 PG (ref 26.6–33)
MCHC RBC AUTO-ENTMCNC: 31.2 G/DL (ref 31.5–35.7)
MCV RBC AUTO: 86.3 FL (ref 79–97)
PLATELET # BLD AUTO: 359 10*3/MM3 (ref 140–450)
PMV BLD AUTO: 11.1 FL (ref 6–12)
POTASSIUM BLD-SCNC: 3.7 MMOL/L (ref 3.5–5.2)
PROT SERPL-MCNC: 6.6 G/DL (ref 6–8.5)
RBC # BLD AUTO: 3.42 10*6/MM3 (ref 4.14–5.8)
SODIUM BLD-SCNC: 138 MMOL/L (ref 136–145)
WBC NRBC COR # BLD: 11.83 10*3/MM3 (ref 3.4–10.8)

## 2019-11-03 PROCEDURE — 80053 COMPREHEN METABOLIC PANEL: CPT | Performed by: INTERNAL MEDICINE

## 2019-11-03 PROCEDURE — 63710000001 INSULIN DETEMIR PER 5 UNITS: Performed by: INTERNAL MEDICINE

## 2019-11-03 PROCEDURE — 99232 SBSQ HOSP IP/OBS MODERATE 35: CPT | Performed by: NURSE PRACTITIONER

## 2019-11-03 PROCEDURE — 25010000002 PIPERACILLIN SOD-TAZOBACTAM PER 1 G: Performed by: INTERNAL MEDICINE

## 2019-11-03 PROCEDURE — 63710000001 INSULIN LISPRO (HUMAN) PER 5 UNITS: Performed by: NURSE PRACTITIONER

## 2019-11-03 PROCEDURE — 85027 COMPLETE CBC AUTOMATED: CPT | Performed by: INTERNAL MEDICINE

## 2019-11-03 PROCEDURE — 82962 GLUCOSE BLOOD TEST: CPT

## 2019-11-03 RX ADMIN — NYSTATIN: 100000 CREAM TOPICAL at 20:12

## 2019-11-03 RX ADMIN — AMLODIPINE BESYLATE 10 MG: 10 TABLET ORAL at 09:47

## 2019-11-03 RX ADMIN — TAZOBACTAM SODIUM AND PIPERACILLIN SODIUM 3.38 G: 375; 3 INJECTION, SOLUTION INTRAVENOUS at 03:13

## 2019-11-03 RX ADMIN — INSULIN LISPRO 2 UNITS: 100 INJECTION, SOLUTION INTRAVENOUS; SUBCUTANEOUS at 09:46

## 2019-11-03 RX ADMIN — INSULIN LISPRO 5 UNITS: 100 INJECTION, SOLUTION INTRAVENOUS; SUBCUTANEOUS at 18:01

## 2019-11-03 RX ADMIN — ASPIRIN 81 MG: 81 TABLET, COATED ORAL at 09:47

## 2019-11-03 RX ADMIN — NYSTATIN: 100000 CREAM TOPICAL at 09:47

## 2019-11-03 RX ADMIN — SODIUM CHLORIDE, PRESERVATIVE FREE 10 ML: 5 INJECTION INTRAVENOUS at 09:47

## 2019-11-03 RX ADMIN — INSULIN DETEMIR 22 UNITS: 100 INJECTION, SOLUTION SUBCUTANEOUS at 20:15

## 2019-11-03 RX ADMIN — Medication 250 MG: at 20:13

## 2019-11-03 RX ADMIN — TAZOBACTAM SODIUM AND PIPERACILLIN SODIUM 3.38 G: 375; 3 INJECTION, SOLUTION INTRAVENOUS at 10:29

## 2019-11-03 RX ADMIN — DULOXETINE HYDROCHLORIDE 30 MG: 30 CAPSULE, DELAYED RELEASE ORAL at 09:48

## 2019-11-03 RX ADMIN — INSULIN LISPRO 2 UNITS: 100 INJECTION, SOLUTION INTRAVENOUS; SUBCUTANEOUS at 12:21

## 2019-11-03 RX ADMIN — METOPROLOL TARTRATE 100 MG: 100 TABLET ORAL at 09:47

## 2019-11-03 RX ADMIN — SODIUM CHLORIDE, PRESERVATIVE FREE 10 ML: 5 INJECTION INTRAVENOUS at 20:12

## 2019-11-03 RX ADMIN — METOPROLOL TARTRATE 100 MG: 100 TABLET ORAL at 20:14

## 2019-11-03 RX ADMIN — Medication 250 MG: at 09:47

## 2019-11-03 RX ADMIN — TAZOBACTAM SODIUM AND PIPERACILLIN SODIUM 3.38 G: 375; 3 INJECTION, SOLUTION INTRAVENOUS at 17:12

## 2019-11-03 RX ADMIN — TERAZOSIN HYDROCHLORIDE ANHYDROUS 5 MG: 5 CAPSULE ORAL at 20:13

## 2019-11-03 RX ADMIN — INSULIN DETEMIR 22 UNITS: 100 INJECTION, SOLUTION SUBCUTANEOUS at 09:47

## 2019-11-03 NOTE — PLAN OF CARE
Problem: Patient Care Overview  Goal: Plan of Care Review  Outcome: Ongoing (interventions implemented as appropriate)   11/03/19 4005   Coping/Psychosocial   Plan of Care Reviewed With patient;spouse   OTHER   Outcome Summary pt a/o x 4, room air, NSR, VSS. GALO drain had minimal output today, 5 mLs. PICC line dressing changed today. pt refusing SQ heparin and oral Renvela. NS infusing @ 100 mL/hr.

## 2019-11-03 NOTE — PROGRESS NOTES
[facility] Progress Note        Antibiotics:  Anti-Infectives (From admission, onward)    Ordered     Dose/Rate Route Frequency Start Stop    10/30/19 0725  fluconazole (DIFLUCAN) IVPB 200 mg     Ordering Provider:  Usman Dong MD    200 mg  over 60 Minutes Intravenous Daily 10/30/19 0900 19 1028    10/28/19 0743  piperacillin-tazobactam (ZOSYN) 3.375 g in iso-osmotic dextrose 50 ml (premix)     Ordering Provider:  Usman Dong MD    3.375 g  over 4 Hours Intravenous Every 8 Hours 10/28/19 1700 19 1659    10/28/19 0743  piperacillin-tazobactam (ZOSYN) 3.375 g in iso-osmotic dextrose 50 ml (premix)     Ordering Provider:  Usman Dong MD    3.375 g  over 30 Minutes Intravenous Once 10/28/19 1100 10/28/19 1303    10/26/19 2040  meropenem (MERREM) 1 g/100 mL 0.9% NS VTB (mbp)     Ordering Provider:  Susan Infante APRN    1 g  over 30 Minutes Intravenous Once 10/26/19 2130 10/26/19 2241          CC: fatigue    HPI:    Patient is a 49 y.o.  Yr old male with history of poorly contolled T2DM, DUNLAP/liver cirrhosis, HTN, PVD/Left BKA, and multiple skin abscesses/MRSA who presented to [facility] ED with right shin wound infection summer 2018.  He was unable to get to see Dr. Martinez as his father passed away unexpectedly on 18 and he had to wait until after the .  The wound worsened becoming gangrenous and he came to [facility] ED in 2018.  He also had been hospitalized at [facility] and then transferred to  for a severe penis/scrotum infection requiring surgical debridement in summer 2018.  He received antibiotics regarding his right leg infection, generally improved over the remainder of summer 2018 but that area had not completely healed. He was admitted 2019 with acute left lower abdominal wall redness/swelling and pain, spontaneous drainage associated with fever/chills and uncontrolled blood sugars.   I&D requiring wide debridement , severe/deep  infection and transferred to [facility] on May 3 with IV Zosyn/oral doxycycline. Cultures had lactobacillus and mixed gram-positive skin sherly including alpha strep, staph epidermidis and corynebacterium. Readmitted to [facility] May 18, 2019, increasing generalized edema ultimately transitioned to oral doxycycline and healed.     He presented to the emergency room July 30, 2019 with acute rectal pain that had begun 7/27; this is associated with constipation for days, chills and nausea/dry heaving.  White blood cell count elevated, high hemoglobin A1c over 13, urinalysis with hematuria/pyuria and CT scan with 3 x 3 cm fluid collection anterior to the distal rectum and per discussion with Dr. Akbar/Jennifer, this is not in the prostate.  Colorectal surgery/urology saw him for consideration of surgical options/timing/threshold, etc..     8/2/19 I&Dper Dr Payne perirectal abscess; patient reports that he had instrumented his rectum prior to hospitalization with enema     8/3/19 urinary retention overnight requiring in/out catheterization per patient.  Creat climb     8/4/19 further creat climb; childs placed and lisinopril stopped and d/w Dr Rubio;  ?obstruction initially associated with retention postop (1200 out with I/O cath postop per nursing) -v- contrast from CT -v- lisinopril/medication/vancomycin -v- relative hypotension -v- likely combination of factors with ATN; nephrology following; vancomycin trough high and vancomycin stopped empirically 8/3 although high trough potentially accumulation as a consequence of diminishing renal function associated with retention/contrast/pressure rather than cause.  Nonetheless, avoid for now; creatinine trending down.        8/7 in retrospect he remembers air in his urine at admit which has raised concern for possible fistula from urinary tract;  No fecaluria and culture from abscess is fecal sherly;  ?rectal-urethral fistula;  Dr Payne aware     Culture with E.  "coli/Klebsiella species and creatinine generally trended down, transitioned to oral antibiotics at discharge.     Readmitted August 17, 2019 with nausea/vomiting/dry heaves for 3 days.  Walker catheter was placed and CT scan of the abdomen showed:      \"Previously seen fluid collection identified inferior to the prostate gland is more increased in density on today's examination. There is wall thickening again seen throughout the bladder. Findings again are concerning for cystitis.\" per radiology     He was transitioned to oral omnicef/topical antifungal on approx 8/23/19; Noncompliant with f/u in our office     READMITTED 10/8/19 RLQ abd pain, urinary retention, nausea, dysuria and generalized fatigue     UTI by U/A, subsequent blood cultures positive for  kleb sp, urine with kleb and e coli ;  Abnormal CT abd with right perinephric fluid collection, advanced cirrhosis, urinary bladder thickening.  10/9 Aspirate of perinephric fluid collection consistent with abscess/kleb and white blood cells; 10/16/19 cytoscopy with bullous change per Dr Gutierres but no fistula per him; 10/19/19 intermittent nausea, no vomiting or dry heaves this am, intermittent dry cough broadened to zosyn with ?aspiration pneumonia evolving after dry heaves/vomiting and had intermittent diarrhea, oral vancomycin added empirically with history of prior C. Difficile; improved with IV Zosyn/oral vancomycin and he refused consideration of placement.  He insisted on home, discharged October 23 and noncompliant with outpatient therapy and outpatient follow-up October 25.  He had become fatigued such that his family could not assist him out of bed and it was recommended by my office that he return to the emergency room October 25.  He apparently refused that but did come to the emergency room October 26.     Readmitted October 26, 2019 with generalized weakness, fatigue/malaise and nausea/vomiting/diarrhea.  CT scan revealed increased size of perinephric " fluid collection per radiology.     On October 28, patient had reported increased diarrhea, at least 6 episodes overnight and watery/thin but no hematochezia melena or hematemesis.  Vomiting has subsided     10/29 drain placed    11/3/19 intermittent diarrhea but consistency varies and not always diarrhea per family, less urinary frequency with yeast in urine and diflucan added 10/30; vague abdominal discomfort that he describes as dull, intermittent, nonradiating, worse with palpation, generally better with pain meds and 3 out of 10.  No dysuria hematuria or pyuria.  Denies new hesitancy urgency or flank pain. No diarrhea     No headache photophobia or neck stiffness.  No shortness of breath cough or hemoptysis.  No fevers chills or sweats.  No rash.  No other particular exposures     ROS:      11/3/19 No f/c/s. No vomiting. No rash. No new ADR to Abx.     Constitutional-- No Fever, chills or sweats.  Appetite diminished with generalized malaise and fatigue  Heent-- No new vision, hearing or throat complaints.  No epistaxis or oral sores.  Denies odynophagia or dysphagia.  No flashers, floaters or eye pain. No odynophagia or dysphagia. No headache, photophobia or neck stiffness.  CV-- No chest pain, palpitation or syncope  Resp-- No SOB/cough/Hemoptysis  GI-as above.  No hematochezia, melena, or hematemesis. Denies jaundice or chronic liver disease.  -- No dysuria, hematuria, or flank pain.  Denies hesitancy or flank pain.  Lymph- no swollen lymph nodes in neck/axilla or groin.   Heme- No active bruising or bleeding; no Hx of DVT or PE.  MS-- no swelling or pain in the bones or joints of arms/legs.  No new back pain.  Neuro-- No acute focal weakness or numbness in the arms or legs.  No seizures.     Full 12 point review of systems reviewed and negative otherwise for acute complaints, except for above      PE:   /98 (BP Location: Left arm, Patient Position: Lying) Comment: notified RN  Pulse 85   Temp 97.7  "°F (36.5 °C) (Oral)   Resp 16   Ht 190.5 cm (75\")   Wt 121 kg (267 lb)   SpO2 98%   BMI 33.37 kg/m²     GENERAL: Awake and alert, in no acute distress.   HEENT: Normocephalic, atraumatic.  PERRL. EOMI. No conjunctival injection. No icterus. Oropharynx clear without evidence of thrush or exudate. No evidence of peridontal disease.    NECK: Supple without nuchal rigidity. No mass.  LYMPH: No cervical, axillary or inguinal lymphadenopathy.  HEART: RRR; No murmur, rubs, gallops.   LUNGS: Diminished at bases to auscultation bilaterally with slight rhonchi bilateral but no wheezing or rales. Normal respiratory effort. Nonlabored. No dullness.  ABDOMEN: Soft, nontender, nondistended. Positive bowel sounds. No rebound or guarding. NO mass or HSM.  EXT:  No cyanosis, clubbing or edema. No cord.  : vague erythema/sattelite lesions c/w cutaneous candidiasis  MSK: FROM without joint effusions noted arms/legs.    SKIN: Warm and dry without cutaneous eruptions on Inspection/palpation.    NEURO: Oriented to PPT. No focal deficits on motor/sensory exam at arms/legs.     No peripheral stigmata/phenomena of endocarditis     PICC line without obvious redness or drainage     Laboratory Data    Results from last 7 days   Lab Units 11/03/19  0804 11/02/19  0841 10/31/19  0659   WBC 10*3/mm3 11.83* 14.38* 12.31*   HEMOGLOBIN g/dL 9.2* 9.0* 9.5*   HEMATOCRIT % 29.5* 30.2* 31.2*   PLATELETS 10*3/mm3 359 337 308     Results from last 7 days   Lab Units 11/03/19  0804   SODIUM mmol/L 138   POTASSIUM mmol/L 3.7   CHLORIDE mmol/L 102   CO2 mmol/L 25.0   BUN mg/dL 11   CREATININE mg/dL 0.45*   GLUCOSE mg/dL 227*   CALCIUM mg/dL 8.6     Results from last 7 days   Lab Units 11/03/19  0804   ALK PHOS U/L 232*   BILIRUBIN mg/dL <0.2*   ALT (SGPT) U/L 17   AST (SGOT) U/L 20               Estimated Creatinine Clearance: 272.2 mL/min (A) (by C-G formula based on SCr of 0.45 mg/dL (L)).      Microbiology:      Radiology:  Imaging Results (last 72 " hours)     Procedure Component Value Units Date/Time    CT Abdomen Pelvis With Contrast [[id number]] Collected:  10/26/19 2035     Updated:  10/26/19 2037    Narrative:       CT ABDOMEN AND PELVIS WITH CONTRAST, 10/26/2019    HISTORY:  49-year-old male with recent history of a right perinephric fluid collection that underwent diagnostic needle aspiration 10/9/2019. He has been on IV antibiotic therapy for possible perinephric abscess. Examination is requested today for follow-up.    TECHNIQUE:  CT imaging of the abdomen and pelvis with IV contrast. Radiation dose reduction techniques included automated exposure control. Radiation audit for CT and nuclear cardiology exams in the last 12 months: 0.    ABDOMEN FINDINGS:  Rim-enhancing pericapsular or subcapsular fluid collection along the posterior margin of the lower pole right kidney has enlarged since 10/18/2019. It previously measured about 5.7 x 3.9 cm axially and currently measures 6.0 x 4.8 cm in the same location  (see axial image 48). There is associated perinephric soft tissue stranding, most likely inflammatory. The findings are compatible with clinically suspected perinephric abscess.    Both kidneys enhance normally. There is no evidence of upper urinary tract obstruction.    Liver, pancreas and spleen are within normal limits. No bile duct or pancreatic duct dilatation.    Small bowel and colon are normal in caliber and appearance without GI contrast. Normal appendix. No gastric distention, hiatal hernia or distal esophageal dilatation.    PELVIS FINDINGS:  Urinary bladder, prostate and rectum are within normal limits.    Lung base images show a tiny right pleural effusion.      Impression:       1.  Likely perinephric or subcapsular abscess along the posterior aspect of the lower pole right kidney. This has enlarged since 10/18/2019.  2.  Normal renal parenchymal enhancement. No CT evidence of pyelonephritis at this time. No evidence of upper urinary  tract obstruction.  3.  Tiny right pleural effusion.    Signer Name: Fadi Healy MD   Signed: 10/26/2019 8:35 PM   Workstation Name: [email or web address]    [facility]            Impression:      --Acute Kleb septicemia/sepsis on treatment since last admit, likely urinary source/pyelnoephritis associated with urinary retention and  with abnormal CT scan with perinephric collection/abscess and Kleb on aspirate that has persisted as of aspirate 10/29 arguing relatively sequestered focus not penetrated by systemic abx and now with drain placed 10/29;  IV  Zosyn ongoing; urology to determine any timing/option or threshold for further intervention such as surgical debridement if not responsive to drain/abx, or other functional/anatomic assessment.       --Acute perinephric abscess as above;   Urology following to help guide timing/option/threshold for further drainage (perc -v- other);  Perc drain 10/29 and kleb persists in culture    --urinary frequency with cutaneous candidiasis and candiduria/pyruria;  Diflucan added 10/30     --abnormal imaging with dry cough and probable pneumonia last admission;  At risk for aspiration with recent vomiting/dry heaves and empiric zosyn started 10/19; changed to Invanz 10/23 to help facilitate home therapy with mom and changed back to Zosyn October 28;   no productive cough at present, but if it develops, then send for culture     --acute  diarrhea 10/27-10/28 with Hx CDiff per him/family and recent empiric oral vancomycin, CDT negative and oral vancomycin stopped;  If recurrent diarrhea then consider more wrolup or empiric therapy     --Hx perirectal abscess ; Colorectal surgery, Dr. Payne previously saw and is following.  Drainage as above on August 2 with mxed Fecal sherly in culture; CT scans  with no mention of abscess on 8/18 study;   any timing/option or threshold for further GI/colorectal work-up per Dr payne/CRS     --History urinary retention.  urology  following     --Hx ARF in August 2019;  ?obstruction initially associated with retention postop (1200 out with I/O cath postop per nursing) -v- contrast from CT -v- lisinopril/medication/vancomycin -v- relative hypotension -v- likely combination of factors with ATN;  vancomycin trough high and vancomycin stopped empirically 8/3 although high trough potentially accumulation as a consequence of diminishing renal function associated with retention/contrast/pressure rather than cause.  Nonetheless, avoid for now; likely further acute kidney injury at admission August 17 associated with dehydration/prerenal/ATN etiology     --History MRSA;  Not in current culture     --Diabetes mellitus type 2, uncontrolled.  He reports the reason for this is forgetfulness     --History [place] and chronic liver disease/cirrhosis  per past notes     --Left BKA     --Peripheral vascular disease     --medical noncompliance and inability to care for self at home.       PLAN:      --IV Zosyn; IV diflucan      --Check/review labs cultures and scans     --Discussed with microbiology     --History per nursing staff      --Discussed with family, partial history per them     --Highly complex set of issues with high risk for further serious morbidity and other serious sequela     --Colorectal surgery and urology have followed; urology following to determine timing/option or threshold for further percutaneous aspiration/drainage versus other surgery regarding  abscess     --D/w Dr Gordo Dong MD  11/3/2019

## 2019-11-03 NOTE — PLAN OF CARE
Problem: Patient Care Overview  Goal: Plan of Care Review  Outcome: Ongoing (interventions implemented as appropriate)   11/03/19 0604   Coping/Psychosocial   Plan of Care Reviewed With patient   Plan of Care Review   Progress no change   OTHER   Outcome Summary Pt A&O x4. VSS on RA. Pt has had no complaints. Spouse at bedside. Patient has refused to turn. NS infusing @ 100 ml/hr. Will continue to monitor.

## 2019-11-03 NOTE — PROGRESS NOTES
[facility] Medicine Services  PROGRESS NOTE    Patient Name: Kendrick Crystal  : 1970  MRN: [id number]    Date of Admission: 10/26/2019  Primary Care Physician: Provider, No Known    Subjective   Subjective     CC:  sepsis    HPI:  No complaints of pain  States he was up all night and couldn't sleep    Review of Systems  Gen- No fevers, chills  CV- No chest pain, palpitations  Resp- No cough, dyspnea  GI- No N/V/D, abd pain    Objective   Objective     Vital Signs:   Temp:  [97.7 °F (36.5 °C)-98.7 °F (37.1 °C)] 98 °F (36.7 °C)  Heart Rate:  [78-85] 84  Resp:  [16-18] 16  BP: (148-177)/(92-98) 166/98        Physical Exam:  Constitutional: No acute distress, awake, alert  HENT: NCAT, mucous membranes moist  Respiratory: Clear to auscultation bilaterally, respiratory effort normal   Cardiovascular: RRR, no murmurs, rubs, or gallops, palpable pedal pulses bilaterally  Gastrointestinal: Positive bowel sounds, soft, nontender, nondistended, obese.  Right flank GALO with minimal output  Musculoskeletal: Left BKA  Psychiatric: Appropriate affect, cooperative  Neurologic: Oriented x 3, OLGUIN, speech clear  Skin: No rashes    Results Reviewed:    Results from last 7 days   Lab Units 11/03/19  0804 11/02/19  0841 10/31/19  0659  10/29/19  0845   WBC 10*3/mm3 11.83* 14.38* 12.31*   < >  --    HEMOGLOBIN g/dL 9.2* 9.0* 9.5*   < >  --    HEMATOCRIT % 29.5* 30.2* 31.2*   < >  --    PLATELETS 10*3/mm3 359 337 308   < >  --    INR   --   --   --   --  1.09    < > = values in this interval not displayed.     Results from last 7 days   Lab Units 11/03/19  0804 11/02/19  0841 10/31/19  0659   SODIUM mmol/L 138 137 135*   POTASSIUM mmol/L 3.7 4.3 3.9   CHLORIDE mmol/L 102 105 101   CO2 mmol/L 25.0 24.0 24.0   BUN mg/dL 11 14 8   CREATININE mg/dL 0.45* 0.51* 0.44*   GLUCOSE mg/dL 227* 158* 114*   CALCIUM mg/dL 8.6 8.3* 8.2*   ALT (SGPT) U/L 17 14 19   AST (SGOT) U/L 20 11 19     Estimated Creatinine  Clearance: 272.2 mL/min (A) (by C-G formula based on SCr of 0.45 mg/dL (L)).    Microbiology Results Abnormal     Procedure Component Value - Date/Time    Stool Culture (Reference Lab) - Stool, Per Rectum [[id number]] Collected:  10/30/19 0507    Lab Status:  Preliminary result Specimen:  Stool from Per Rectum Updated:  11/02/19 1307     Salmonella/Shigella Screen Final report     Result 1 Comment     Comment: No Salmonella or Shigella recovered.        E coli, Shiga toxin Assay Negative    Narrative:       Performed at:  [address]  [id number]  : Dl Laird PhD, Phone:  [phone]    Gastrointestinal Panel, PCR ([facility]) - Stool, Per Rectum [[id number]] Collected:  10/30/19 0507    Lab Status:  Final result Specimen:  Stool from Per Rectum Updated:  11/01/19 1357     Reference Lab Report See Attached Report     Comment: See scanned report       Blood Culture - Blood, Wrist, Right [[id number]] Collected:  10/26/19 1805    Lab Status:  Final result Specimen:  Blood from Wrist, Right Updated:  10/31/19 2000     Blood Culture No growth at 5 days    Blood Culture - Blood, Hand, Right [[id number]] Collected:  10/26/19 1822    Lab Status:  Final result Specimen:  Blood from Hand, Right Updated:  10/31/19 2000     Blood Culture No growth at 5 days    Body Fluid Culture - Body Fluid, Kidney, Right [[id number]]  (Abnormal)  (Susceptibility) Collected:  10/29/19 1057    Lab Status:  Final result Specimen:  Body Fluid from Kidney, Right Updated:  10/31/19 0624     Body Fluid Culture Scant growth (1+) Klebsiella pneumoniae ssp pneumoniae     Gram Stain Moderate (3+) WBCs seen      No organisms seen    Susceptibility      Klebsiella pneumoniae ssp pneumoniae     EYAD     Ampicillin Resistant     Ampicillin + Sulbactam Susceptible     Cefazolin Susceptible     Cefepime Susceptible     Ceftazidime Susceptible     Ceftriaxone Susceptible     Gentamicin Susceptible      Levofloxacin Susceptible     Piperacillin + Tazobactam Susceptible     Trimethoprim + Sulfamethoxazole Susceptible                    Clostridium Difficile Toxin - Stool, Per Rectum [[id number]] Collected:  10/30/19 0507    Lab Status:  Final result Specimen:  Stool from Per Rectum Updated:  10/30/19 0826    Narrative:       The following orders were created for panel order Clostridium Difficile Toxin - Stool, Per Rectum.  Procedure                               Abnormality         Status                     ---------                               -----------         ------                     Clostridium Difficile To...[[id number]]  Normal              Final result                 Please view results for these tests on the individual orders.    Clostridium Difficile Toxin, PCR - Stool, Per Rectum [[id number]]  (Normal) Collected:  10/30/19 0507    Lab Status:  Final result Specimen:  Stool from Per Rectum Updated:  10/30/19 0826     C. Difficile Toxins by PCR Not Detected    Narrative:       Performance characteristics of test not established for patients <2 years of age.  Negative for Toxigenic C. Difficile    Urine Culture - Urine, Urine, Clean Catch [[id number]]  (Abnormal) Collected:  10/28/19 1341    Lab Status:  Final result Specimen:  Urine, Clean Catch Updated:  10/29/19 1033     Urine Culture Yeast isolated          Imaging Results (Last 24 Hours)     ** No results found for the last 24 hours. **          Results for orders placed during the hospital encounter of 05/18/19   Adult Transthoracic Echo Complete W/ Cont if Necessary Per Protocol    Narrative · Estimated EF = 60%.  · Mild mitral valve regurgitation is present  · Mild tricuspid valve regurgitation is present.  · Calculated right ventricular systolic pressure from tricuspid   regurgitation is 29 mmHg.          I have reviewed the medications:  Scheduled Meds:    amLODIPine 10 mg Oral Q24H   aspirin 81 mg Oral Daily   DULoxetine 30 mg Oral Daily    heparin (porcine) 5,000 Units Subcutaneous Q8H   insulin detemir 22 Units Subcutaneous Q12H   insulin lispro 0-7 Units Subcutaneous 4x Daily With Meals & Nightly   metoprolol tartrate 100 mg Oral Q12H   nystatin  Topical BID   piperacillin-tazobactam 3.375 g Intravenous Q8H   saccharomyces boulardii 250 mg Oral BID   sevelamer 800 mg Oral TID With Meals   sodium chloride 10 mL Intravenous Q12H   terazosin 5 mg Oral Nightly     Continuous Infusions:    sodium chloride 100 mL/hr Last Rate: 100 mL/hr (11/02/19 2358)     PRN Meds:.•  acetaminophen **OR** acetaminophen **OR** acetaminophen  •  dextrose  •  dextrose  •  glucagon (human recombinant)  •  magnesium sulfate **OR** magnesium sulfate **OR** magnesium sulfate  •  ondansetron  •  potassium chloride **OR** potassium chloride **OR** potassium chloride  •  Insert peripheral IV **AND** sodium chloride  •  sodium chloride      Assessment/Plan   Assessment / Plan     Active Hospital Problems    Diagnosis  POA   • Perinephric abscess [N15.1]  Yes   • Peripheral vascular disease (CMS/[facility]) [I73.9]  Yes   • S/P BKA (below knee amputation), left (CMS/[facility]) [Z89.512]  Not Applicable   • Diabetic ulcer of right lower leg (CMS/[facility]) [E11.622, L97.919]  Yes   • Type 2 diabetes mellitus with circulatory disorder and uncontrolled [E11.59]  Yes   • Hyperlipemia [E78.5]  Yes   • Essential hypertension [I10]  Yes      Resolved Hospital Problems   No resolved problems to display.        Brief Hospital Course to date:  Kendrick Crystal is a 49 male recently discharged from the hospital with a diagnosis of a perinephric abscess.  Patient was discharged on Invanz.  Today, patient presents with weakness, malaise, fatigue.  CT scan indicated the abscess if larger than previous scan 10/18/2019. ID recommends changing Invanz to meropenem 500 mg IV every 6 hours. Patient is also presenting with S/S of sepsis.       Sepsis secondary to perinephric abscess  -klebsiella, continue zosyn  -discussed  abscess drain care with Urology Dr Gutierres  -WBC count improved  -ID follwoing    RUQ pain  -no pain complaints today    Hypomag  -replace per protocol    Diabetes mellitus, poorly controlled  -continue basal/bolus, adjust as needed. Dietary indiscretion noted while inpatient making blood sugar more difficult to control   Component      Latest Ref Rng & Units 11/2/2019 11/3/2019 11/3/2019 11/3/2019           7:42 PM  8:04 AM  8:26 AM 11:48 AM   Glucose      70 - 130 mg/dL 196 (H) 227 (H) 198 (H) 163 (H)     Hypertension  -Continue amlodipine and metoprolol  -Hytrin 5 mg HS     Hx of C-diff  -loose stool resolved     Depression  -continue cymbalta 30 mg daily     Wound to the right ankle  -consult wound care    Generalized weakness  -encouraged participation with PT    Medical Non-compliance  -complicates care     DVT prophylaxis:  Heparin 5000 units SQ q 8 hours    Disposition: will need continued IV antibiotics.    CODE STATUS:   Code Status and Medical Interventions:   Ordered at: 10/26/19 2039     Level Of Support Discussed With:    Patient     Code Status:    CPR     Medical Interventions (Level of Support Prior to Arrest):    Full         Electronically signed by CHINTAN Hamilton, 11/03/19, 1:53 PM.

## 2019-11-04 LAB
CAMPYLOBACTER STL CULT: NORMAL
E COLI SXT STL QL IA: NEGATIVE
GLUCOSE BLDC GLUCOMTR-MCNC: 132 MG/DL (ref 70–130)
GLUCOSE BLDC GLUCOMTR-MCNC: 161 MG/DL (ref 70–130)
GLUCOSE BLDC GLUCOMTR-MCNC: 187 MG/DL (ref 70–130)
GLUCOSE BLDC GLUCOMTR-MCNC: 248 MG/DL (ref 70–130)
Lab: NORMAL
Lab: NORMAL
SALM + SHIG STL CULT: NORMAL

## 2019-11-04 PROCEDURE — 25010000002 HEPARIN (PORCINE) PER 1000 UNITS: Performed by: NURSE PRACTITIONER

## 2019-11-04 PROCEDURE — 25010000002 FLUCONAZOLE PER 200 MG: Performed by: INTERNAL MEDICINE

## 2019-11-04 PROCEDURE — 63710000001 INSULIN DETEMIR PER 5 UNITS: Performed by: INTERNAL MEDICINE

## 2019-11-04 PROCEDURE — 63710000001 INSULIN LISPRO (HUMAN) PER 5 UNITS: Performed by: NURSE PRACTITIONER

## 2019-11-04 PROCEDURE — 82962 GLUCOSE BLOOD TEST: CPT

## 2019-11-04 PROCEDURE — 99232 SBSQ HOSP IP/OBS MODERATE 35: CPT | Performed by: NURSE PRACTITIONER

## 2019-11-04 PROCEDURE — 25010000002 PIPERACILLIN SOD-TAZOBACTAM PER 1 G: Performed by: INTERNAL MEDICINE

## 2019-11-04 RX ORDER — FLUCONAZOLE 2 MG/ML
200 INJECTION, SOLUTION INTRAVENOUS DAILY
Status: COMPLETED | OUTPATIENT
Start: 2019-11-04 | End: 2019-11-10

## 2019-11-04 RX ADMIN — INSULIN LISPRO 2 UNITS: 100 INJECTION, SOLUTION INTRAVENOUS; SUBCUTANEOUS at 09:10

## 2019-11-04 RX ADMIN — INSULIN LISPRO 5 UNITS: 100 INJECTION, SOLUTION INTRAVENOUS; SUBCUTANEOUS at 09:14

## 2019-11-04 RX ADMIN — AMLODIPINE BESYLATE 10 MG: 10 TABLET ORAL at 09:10

## 2019-11-04 RX ADMIN — INSULIN LISPRO 5 UNITS: 100 INJECTION, SOLUTION INTRAVENOUS; SUBCUTANEOUS at 12:11

## 2019-11-04 RX ADMIN — Medication 250 MG: at 09:10

## 2019-11-04 RX ADMIN — Medication 250 MG: at 20:36

## 2019-11-04 RX ADMIN — TAZOBACTAM SODIUM AND PIPERACILLIN SODIUM 3.38 G: 375; 3 INJECTION, SOLUTION INTRAVENOUS at 18:08

## 2019-11-04 RX ADMIN — SODIUM CHLORIDE, PRESERVATIVE FREE 10 ML: 5 INJECTION INTRAVENOUS at 20:37

## 2019-11-04 RX ADMIN — INSULIN DETEMIR 22 UNITS: 100 INJECTION, SOLUTION SUBCUTANEOUS at 09:13

## 2019-11-04 RX ADMIN — TERAZOSIN HYDROCHLORIDE ANHYDROUS 5 MG: 5 CAPSULE ORAL at 20:36

## 2019-11-04 RX ADMIN — INSULIN DETEMIR 22 UNITS: 100 INJECTION, SOLUTION SUBCUTANEOUS at 20:39

## 2019-11-04 RX ADMIN — NYSTATIN: 100000 CREAM TOPICAL at 20:37

## 2019-11-04 RX ADMIN — ASPIRIN 81 MG: 81 TABLET, COATED ORAL at 09:10

## 2019-11-04 RX ADMIN — DULOXETINE HYDROCHLORIDE 30 MG: 30 CAPSULE, DELAYED RELEASE ORAL at 09:10

## 2019-11-04 RX ADMIN — METOPROLOL TARTRATE 100 MG: 100 TABLET ORAL at 09:10

## 2019-11-04 RX ADMIN — SODIUM CHLORIDE 100 ML/HR: 9 INJECTION, SOLUTION INTRAVENOUS at 16:47

## 2019-11-04 RX ADMIN — HEPARIN SODIUM 5000 UNITS: 5000 INJECTION INTRAVENOUS; SUBCUTANEOUS at 20:36

## 2019-11-04 RX ADMIN — INSULIN LISPRO 5 UNITS: 100 INJECTION, SOLUTION INTRAVENOUS; SUBCUTANEOUS at 18:09

## 2019-11-04 RX ADMIN — METOPROLOL TARTRATE 100 MG: 100 TABLET ORAL at 20:36

## 2019-11-04 RX ADMIN — HEPARIN SODIUM 5000 UNITS: 5000 INJECTION INTRAVENOUS; SUBCUTANEOUS at 15:12

## 2019-11-04 RX ADMIN — INSULIN LISPRO 3 UNITS: 100 INJECTION, SOLUTION INTRAVENOUS; SUBCUTANEOUS at 20:38

## 2019-11-04 RX ADMIN — SODIUM CHLORIDE, PRESERVATIVE FREE 10 ML: 5 INJECTION INTRAVENOUS at 09:13

## 2019-11-04 RX ADMIN — INSULIN LISPRO 2 UNITS: 100 INJECTION, SOLUTION INTRAVENOUS; SUBCUTANEOUS at 18:08

## 2019-11-04 RX ADMIN — TAZOBACTAM SODIUM AND PIPERACILLIN SODIUM 3.38 G: 375; 3 INJECTION, SOLUTION INTRAVENOUS at 09:06

## 2019-11-04 RX ADMIN — NYSTATIN: 100000 CREAM TOPICAL at 12:12

## 2019-11-04 RX ADMIN — FLUCONAZOLE 200 MG: 200 INJECTION, SOLUTION INTRAVENOUS at 12:11

## 2019-11-04 RX ADMIN — TAZOBACTAM SODIUM AND PIPERACILLIN SODIUM 3.38 G: 375; 3 INJECTION, SOLUTION INTRAVENOUS at 00:02

## 2019-11-04 NOTE — PROGRESS NOTES
[facility] Progress Note        Antibiotics:  Anti-Infectives (From admission, onward)    Ordered     Dose/Rate Route Frequency Start Stop    10/30/19 0725  fluconazole (DIFLUCAN) IVPB 200 mg     Ordering Provider:  Usman Dong MD    200 mg  over 60 Minutes Intravenous Daily 10/30/19 0900 19 1028    10/28/19 0743  piperacillin-tazobactam (ZOSYN) 3.375 g in iso-osmotic dextrose 50 ml (premix)     Ordering Provider:  Usman Dong MD    3.375 g  over 4 Hours Intravenous Every 8 Hours 10/28/19 1700 19 1659    10/28/19 0743  piperacillin-tazobactam (ZOSYN) 3.375 g in iso-osmotic dextrose 50 ml (premix)     Ordering Provider:  Usman Dong MD    3.375 g  over 30 Minutes Intravenous Once 10/28/19 1100 10/28/19 1303    10/26/19 2040  meropenem (MERREM) 1 g/100 mL 0.9% NS VTB (mbp)     Ordering Provider:  Susan Infante APRN    1 g  over 30 Minutes Intravenous Once 10/26/19 2130 10/26/19 2241          CC: fatigue    HPI:    Patient is a 49 y.o.  Yr old male with history of poorly contolled T2DM, DUNLAP/liver cirrhosis, HTN, PVD/Left BKA, and multiple skin abscesses/MRSA who presented to [facility] ED with right shin wound infection summer 2018.  He was unable to get to see Dr. Martinez as his father passed away unexpectedly on 18 and he had to wait until after the .  The wound worsened becoming gangrenous and he came to [facility] ED in 2018.  He also had been hospitalized at  and then transferred to  for a severe penis/scrotum infection requiring surgical debridement in summer 2018.  He received antibiotics regarding his right leg infection, generally improved over the remainder of summer 2018 but that area had not completely healed. He was admitted 2019 with acute left lower abdominal wall redness/swelling and pain, spontaneous drainage associated with fever/chills and uncontrolled blood sugars.   I&D requiring wide debridement , severe/deep  infection and transferred to [facility] on May 3 with IV Zosyn/oral doxycycline. Cultures had lactobacillus and mixed gram-positive skin sherly including alpha strep, staph epidermidis and corynebacterium. Readmitted to [facility] May 18, 2019, increasing generalized edema ultimately transitioned to oral doxycycline and healed.     He presented to the emergency room July 30, 2019 with acute rectal pain that had begun 7/27; this is associated with constipation for days, chills and nausea/dry heaving.  White blood cell count elevated, high hemoglobin A1c over 13, urinalysis with hematuria/pyuria and CT scan with 3 x 3 cm fluid collection anterior to the distal rectum and per discussion with Dr. Akbar/Jennifer, this is not in the prostate.  Colorectal surgery/urology saw him for consideration of surgical options/timing/threshold, etc..     8/2/19 I&Dper Dr Payne perirectal abscess; patient reports that he had instrumented his rectum prior to hospitalization with enema     8/3/19 urinary retention overnight requiring in/out catheterization per patient.  Creat climb     8/4/19 further creat climb; childs placed and lisinopril stopped and d/w Dr Rubio;  ?obstruction initially associated with retention postop (1200 out with I/O cath postop per nursing) -v- contrast from CT -v- lisinopril/medication/vancomycin -v- relative hypotension -v- likely combination of factors with ATN; nephrology following; vancomycin trough high and vancomycin stopped empirically 8/3 although high trough potentially accumulation as a consequence of diminishing renal function associated with retention/contrast/pressure rather than cause.  Nonetheless, avoid for now; creatinine trending down.        8/7 in retrospect he remembers air in his urine at admit which has raised concern for possible fistula from urinary tract;  No fecaluria and culture from abscess is fecal sherly;  ?rectal-urethral fistula;  Dr Payne aware     Culture with E.  "coli/Klebsiella species and creatinine generally trended down, transitioned to oral antibiotics at discharge.     Readmitted August 17, 2019 with nausea/vomiting/dry heaves for 3 days.  Walker catheter was placed and CT scan of the abdomen showed:      \"Previously seen fluid collection identified inferior to the prostate gland is more increased in density on today's examination. There is wall thickening again seen throughout the bladder. Findings again are concerning for cystitis.\" per radiology     He was transitioned to oral omnicef/topical antifungal on approx 8/23/19; Noncompliant with f/u in our office     READMITTED 10/8/19 RLQ abd pain, urinary retention, nausea, dysuria and generalized fatigue     UTI by U/A, subsequent blood cultures positive for  kleb sp, urine with kleb and e coli ;  Abnormal CT abd with right perinephric fluid collection, advanced cirrhosis, urinary bladder thickening.  10/9 Aspirate of perinephric fluid collection consistent with abscess/kleb and white blood cells; 10/16/19 cytoscopy with bullous change per Dr Gutierres but no fistula per him; 10/19/19 intermittent nausea, no vomiting or dry heaves this am, intermittent dry cough broadened to zosyn with ?aspiration pneumonia evolving after dry heaves/vomiting and had intermittent diarrhea, oral vancomycin added empirically with history of prior C. Difficile; improved with IV Zosyn/oral vancomycin and he refused consideration of placement.  He insisted on home, discharged October 23 and noncompliant with outpatient therapy and outpatient follow-up October 25.  He had become fatigued such that his family could not assist him out of bed and it was recommended by my office that he return to the emergency room October 25.  He apparently refused that but did come to the emergency room October 26.     Readmitted October 26, 2019 with generalized weakness, fatigue/malaise and nausea/vomiting/diarrhea.  CT scan revealed increased size of perinephric " "fluid collection per radiology.     On October 28, patient had reported increased diarrhea, at least 6 episodes overnight and watery/thin but no hematochezia melena or hematemesis.  Vomiting has subsided     10/29 drain placed    11/4/19 GI habits variable,  consistency varies and not always diarrhea per family; vague abdominal discomfort that he describes as dull, intermittent, nonradiating, worse with palpation, generally better with pain meds and 3 out of 10.  No dysuria hematuria or pyuria.  Denies new hesitancy urgency or flank pain. No diarrhea     No headache photophobia or neck stiffness.  No shortness of breath cough or hemoptysis.  No fevers chills or sweats.  No rash.  No other particular exposures     ROS:      11/4/19 No f/c/s. No vomiting. No rash. No new ADR to Abx.     Constitutional-- No Fever, chills or sweats.  Appetite diminished with generalized malaise and fatigue  Heent-- No new vision, hearing or throat complaints.  No epistaxis or oral sores.  Denies odynophagia or dysphagia.  No flashers, floaters or eye pain. No odynophagia or dysphagia. No headache, photophobia or neck stiffness.  CV-- No chest pain, palpitation or syncope  Resp-- No SOB/cough/Hemoptysis  GI-as above.  No hematochezia, melena, or hematemesis. Denies jaundice or chronic liver disease.  -- No dysuria, hematuria, or flank pain.  Denies hesitancy or flank pain.  Lymph- no swollen lymph nodes in neck/axilla or groin.   Heme- No active bruising or bleeding; no Hx of DVT or PE.  MS-- no swelling or pain in the bones or joints of arms/legs.  No new back pain.  Neuro-- No acute focal weakness or numbness in the arms or legs.  No seizures.     Full 12 point review of systems reviewed and negative otherwise for acute complaints, except for above      PE:   /68 (BP Location: Left arm, Patient Position: Lying)   Pulse 70   Temp 97.4 °F (36.3 °C) (Oral)   Resp 18   Ht 190.5 cm (75\")   Wt 121 kg (267 lb)   SpO2 98%   BMI " 33.37 kg/m²     GENERAL: Awake and alert, in no acute distress.   HEENT: Normocephalic, atraumatic.  PERRL. EOMI. No conjunctival injection. No icterus. Oropharynx clear without evidence of thrush or exudate. No evidence of peridontal disease.    NECK: Supple without nuchal rigidity. No mass.  LYMPH: No cervical, axillary or inguinal lymphadenopathy.  HEART: RRR; No murmur, rubs, gallops.   LUNGS: Diminished at bases to auscultation bilaterally with slight rhonchi bilateral but no wheezing or rales. Normal respiratory effort. Nonlabored. No dullness.  ABDOMEN: Soft, nontender, nondistended. Positive bowel sounds. No rebound or guarding. NO mass or HSM.  EXT:  No cyanosis, clubbing or edema. No cord.  : vague erythema/sattelite lesions c/w cutaneous candidiasis  MSK: FROM without joint effusions noted arms/legs.    SKIN: Warm and dry without cutaneous eruptions on Inspection/palpation.    NEURO: Oriented to PPT. No focal deficits on motor/sensory exam at arms/legs.     No peripheral stigmata/phenomena of endocarditis     PICC line without obvious redness or drainage     Laboratory Data    Results from last 7 days   Lab Units 11/03/19  0804 11/02/19  0841 10/31/19  0659   WBC 10*3/mm3 11.83* 14.38* 12.31*   HEMOGLOBIN g/dL 9.2* 9.0* 9.5*   HEMATOCRIT % 29.5* 30.2* 31.2*   PLATELETS 10*3/mm3 359 337 308     Results from last 7 days   Lab Units 11/03/19  0804   SODIUM mmol/L 138   POTASSIUM mmol/L 3.7   CHLORIDE mmol/L 102   CO2 mmol/L 25.0   BUN mg/dL 11   CREATININE mg/dL 0.45*   GLUCOSE mg/dL 227*   CALCIUM mg/dL 8.6     Results from last 7 days   Lab Units 11/03/19  0804   ALK PHOS U/L 232*   BILIRUBIN mg/dL <0.2*   ALT (SGPT) U/L 17   AST (SGOT) U/L 20               Estimated Creatinine Clearance: 272.2 mL/min (A) (by C-G formula based on SCr of 0.45 mg/dL (L)).      Microbiology:      Radiology:  Imaging Results (last 72 hours)     Procedure Component Value Units Date/Time    CT Abdomen Pelvis With Contrast  [[id number]] Collected:  10/26/19 2035     Updated:  10/26/19 2037    Narrative:       CT ABDOMEN AND PELVIS WITH CONTRAST, 10/26/2019    HISTORY:  49-year-old male with recent history of a right perinephric fluid collection that underwent diagnostic needle aspiration 10/9/2019. He has been on IV antibiotic therapy for possible perinephric abscess. Examination is requested today for follow-up.    TECHNIQUE:  CT imaging of the abdomen and pelvis with IV contrast. Radiation dose reduction techniques included automated exposure control. Radiation audit for CT and nuclear cardiology exams in the last 12 months: 0.    ABDOMEN FINDINGS:  Rim-enhancing pericapsular or subcapsular fluid collection along the posterior margin of the lower pole right kidney has enlarged since 10/18/2019. It previously measured about 5.7 x 3.9 cm axially and currently measures 6.0 x 4.8 cm in the same location  (see axial image 48). There is associated perinephric soft tissue stranding, most likely inflammatory. The findings are compatible with clinically suspected perinephric abscess.    Both kidneys enhance normally. There is no evidence of upper urinary tract obstruction.    Liver, pancreas and spleen are within normal limits. No bile duct or pancreatic duct dilatation.    Small bowel and colon are normal in caliber and appearance without GI contrast. Normal appendix. No gastric distention, hiatal hernia or distal esophageal dilatation.    PELVIS FINDINGS:  Urinary bladder, prostate and rectum are within normal limits.    Lung base images show a tiny right pleural effusion.      Impression:       1.  Likely perinephric or subcapsular abscess along the posterior aspect of the lower pole right kidney. This has enlarged since 10/18/2019.  2.  Normal renal parenchymal enhancement. No CT evidence of pyelonephritis at this time. No evidence of upper urinary tract obstruction.  3.  Tiny right pleural effusion.    Signer Name: Fadi Healy MD    Signed: 10/26/2019 8:35 PM   Workstation Name: [email or web address]    [facility]            Impression:      --Acute Kleb septicemia/sepsis on treatment since last admit, likely urinary source/pyelnoephritis associated with urinary retention and  with abnormal CT scan with perinephric collection/abscess and Kleb on aspirate that has persisted as of aspirate 10/29 arguing relatively sequestered focus not penetrated by systemic abx and now with drain placed 10/29;  IV  Zosyn ongoing; urology to determine any timing/option or threshold for further intervention such as surgical debridement if not responsive to drain/abx, or other functional/anatomic assessment.       --Acute perinephric abscess as above;   Urology following to help guide timing/option/threshold for further drainage (perc -v- other);  Perc drain 10/29 and kleb persists in culture    --urinary frequency with cutaneous candidiasis and candiduria/pyruria;  2 brief courses diflucan     --abnormal imaging with dry cough and probable pneumonia last admission;  At risk for aspiration with recent vomiting/dry heaves and empiric zosyn started 10/19; changed to Invanz 10/23 to help facilitate home therapy with mom and changed back to Zosyn October 28;   no productive cough at present, but if it develops, then send for culture     --acute  diarrhea 10/27-10/28 with Hx CDiff per him/family and recent empiric oral vancomycin, CDT negative and oral vancomycin stopped;  If recurrent diarrhea then consider more wrolup or empiric therapy     --Hx perirectal abscess ; Colorectal surgery, Dr. Payne previously saw and is following.  Drainage as above on August 2 with mxed Fecal sherly in culture; CT scans  with no mention of abscess on 8/18 study;   any timing/option or threshold for further GI/colorectal work-up per Dr payne/CRS     --History urinary retention.  urology following     --Hx ARF in August 2019;  ?obstruction initially associated with  retention postop (1200 out with I/O cath postop per nursing) -v- contrast from CT -v- lisinopril/medication/vancomycin -v- relative hypotension -v- likely combination of factors with ATN;  vancomycin trough high and vancomycin stopped empirically 8/3 although high trough potentially accumulation as a consequence of diminishing renal function associated with retention/contrast/pressure rather than cause.  Nonetheless, avoid for now; likely further acute kidney injury at admission August 17 associated with dehydration/prerenal/ATN etiology     --History MRSA;  Not in current culture     --Diabetes mellitus type 2, uncontrolled.  He reports the reason for this is forgetfulness     --History Johnston and chronic liver disease/cirrhosis  per past notes     --Left BKA     --Peripheral vascular disease     --medical noncompliance and inability to care for self at home.       PLAN:      --IV Zosyn; IV diflucan      --Check/review labs cultures and scans     --Discussed with microbiology     --History per nursing staff      --Discussed with family, partial history per them     --Highly complex set of issues with high risk for further serious morbidity and other serious sequela     --Colorectal surgery and urology have followed; urology following to determine timing/option or threshold for further percutaneous aspiration/drainage versus other surgery regarding  abscess     --D/w Dr Gordo Dong MD  11/4/2019

## 2019-11-04 NOTE — PROGRESS NOTES
[facility] Medicine Services  PROGRESS NOTE    Patient Name: Kendrick Crystal  : 1970  MRN: [id number]    Date of Admission: 10/26/2019  Primary Care Physician: Provider, No Known    Subjective   Subjective     CC:  sepsis    HPI:  No complaints of pain  Asking when he can go home    Review of Systems  Gen- No fevers, chills  CV- No chest pain, palpitations  Resp- No cough, dyspnea  GI- No N/V/D, abd pain    Objective   Objective     Vital Signs:   Temp:  [97.1 °F (36.2 °C)-98.2 °F (36.8 °C)] 97.8 °F (36.6 °C)  Heart Rate:  [70-83] 81  Resp:  [18] 18  BP: (126-172)/() 166/91        Physical Exam:  Constitutional: No acute distress, awake, alert, family at bedside  HENT: NCAT, mucous membranes moist  Respiratory: Clear to auscultation bilaterally, respiratory effort normal   Cardiovascular: RRR, no murmurs, rubs, or gallops, palpable pedal pulses bilaterally  Gastrointestinal: Positive bowel sounds, soft, nontender, nondistended, obese.  Right flank GALO with minimal bloody output  Musculoskeletal: Left BKA  Psychiatric: Flat affect, cooperative  Neurologic: Oriented x 3, OLGUIN, speech clear  Skin: No rashes    Results Reviewed:    Results from last 7 days   Lab Units 11/03/19  0804 11/02/19  0841 10/31/19  0659  10/29/19  0845   WBC 10*3/mm3 11.83* 14.38* 12.31*   < >  --    HEMOGLOBIN g/dL 9.2* 9.0* 9.5*   < >  --    HEMATOCRIT % 29.5* 30.2* 31.2*   < >  --    PLATELETS 10*3/mm3 359 337 308   < >  --    INR   --   --   --   --  1.09    < > = values in this interval not displayed.     Results from last 7 days   Lab Units 11/03/19  0804 11/02/19  0841 10/31/19  0659   SODIUM mmol/L 138 137 135*   POTASSIUM mmol/L 3.7 4.3 3.9   CHLORIDE mmol/L 102 105 101   CO2 mmol/L 25.0 24.0 24.0   BUN mg/dL 11 14 8   CREATININE mg/dL 0.45* 0.51* 0.44*   GLUCOSE mg/dL 227* 158* 114*   CALCIUM mg/dL 8.6 8.3* 8.2*   ALT (SGPT) U/L 17 14 19   AST (SGOT) U/L 20 11 19     Estimated Creatinine  Clearance: 272.2 mL/min (A) (by C-G formula based on SCr of 0.45 mg/dL (L)).    Microbiology Results Abnormal     Procedure Component Value - Date/Time    Stool Culture (Reference Lab) - Stool, Per Rectum [[id number]] Collected:  10/30/19 0507    Lab Status:  Final result Specimen:  Stool from Per Rectum Updated:  11/04/19 1708     Salmonella/Shigella Screen Final report     Result 1 Comment     Comment: No Salmonella or Shigella recovered.        Campylobacter Culture Final report     Result 1 Comment     Comment: No Campylobacter species isolated.        E coli, Shiga toxin Assay Negative    Narrative:       Performed at:  [address]  [id number]  : Dl Laird PhD, Phone:  [phone]    Gastrointestinal Panel, PCR ([facility]) - Stool, Per Rectum [[id number]] Collected:  10/30/19 0507    Lab Status:  Final result Specimen:  Stool from Per Rectum Updated:  11/01/19 1357     Reference Lab Report See Attached Report     Comment: See scanned report       Blood Culture - Blood, Wrist, Right [[id number]] Collected:  10/26/19 1805    Lab Status:  Final result Specimen:  Blood from Wrist, Right Updated:  10/31/19 2000     Blood Culture No growth at 5 days    Blood Culture - Blood, Hand, Right [[id number]] Collected:  10/26/19 1822    Lab Status:  Final result Specimen:  Blood from Hand, Right Updated:  10/31/19 2000     Blood Culture No growth at 5 days    Body Fluid Culture - Body Fluid, Kidney, Right [[id number]]  (Abnormal)  (Susceptibility) Collected:  10/29/19 1057    Lab Status:  Final result Specimen:  Body Fluid from Kidney, Right Updated:  10/31/19 0624     Body Fluid Culture Scant growth (1+) Klebsiella pneumoniae ssp pneumoniae     Gram Stain Moderate (3+) WBCs seen      No organisms seen    Susceptibility      Klebsiella pneumoniae ssp pneumoniae     EYAD     Ampicillin Resistant     Ampicillin + Sulbactam Susceptible     Cefazolin Susceptible     Cefepime  Susceptible     Ceftazidime Susceptible     Ceftriaxone Susceptible     Gentamicin Susceptible     Levofloxacin Susceptible     Piperacillin + Tazobactam Susceptible     Trimethoprim + Sulfamethoxazole Susceptible                    Clostridium Difficile Toxin - Stool, Per Rectum [[id number]] Collected:  10/30/19 0507    Lab Status:  Final result Specimen:  Stool from Per Rectum Updated:  10/30/19 0826    Narrative:       The following orders were created for panel order Clostridium Difficile Toxin - Stool, Per Rectum.  Procedure                               Abnormality         Status                     ---------                               -----------         ------                     Clostridium Difficile To...[[id number]]  Normal              Final result                 Please view results for these tests on the individual orders.    Clostridium Difficile Toxin, PCR - Stool, Per Rectum [[id number]]  (Normal) Collected:  10/30/19 0507    Lab Status:  Final result Specimen:  Stool from Per Rectum Updated:  10/30/19 0826     C. Difficile Toxins by PCR Not Detected    Narrative:       Performance characteristics of test not established for patients <2 years of age.  Negative for Toxigenic C. Difficile    Urine Culture - Urine, Urine, Clean Catch [[id number]]  (Abnormal) Collected:  10/28/19 1341    Lab Status:  Final result Specimen:  Urine, Clean Catch Updated:  10/29/19 1033     Urine Culture Yeast isolated          Imaging Results (Last 24 Hours)     ** No results found for the last 24 hours. **          Results for orders placed during the hospital encounter of 05/18/19   Adult Transthoracic Echo Complete W/ Cont if Necessary Per Protocol    Narrative · Estimated EF = 60%.  · Mild mitral valve regurgitation is present  · Mild tricuspid valve regurgitation is present.  · Calculated right ventricular systolic pressure from tricuspid   regurgitation is 29 mmHg.          I have reviewed the medications:  Scheduled  Meds:    amLODIPine 10 mg Oral Q24H   aspirin 81 mg Oral Daily   DULoxetine 30 mg Oral Daily   fluconazole 200 mg Intravenous Daily   heparin (porcine) 5,000 Units Subcutaneous Q8H   insulin detemir 22 Units Subcutaneous Q12H   insulin lispro 0-7 Units Subcutaneous 4x Daily With Meals & Nightly   insulin lispro 5 Units Subcutaneous TID With Meals   metoprolol tartrate 100 mg Oral Q12H   nystatin  Topical BID   piperacillin-tazobactam 3.375 g Intravenous Q8H   saccharomyces boulardii 250 mg Oral BID   sevelamer 800 mg Oral TID With Meals   sodium chloride 10 mL Intravenous Q12H   terazosin 5 mg Oral Nightly     Continuous Infusions:    sodium chloride 100 mL/hr Last Rate: 100 mL/hr (11/04/19 1647)     PRN Meds:.•  acetaminophen **OR** acetaminophen **OR** acetaminophen  •  dextrose  •  dextrose  •  glucagon (human recombinant)  •  magnesium sulfate **OR** magnesium sulfate **OR** magnesium sulfate  •  ondansetron  •  potassium chloride **OR** potassium chloride **OR** potassium chloride  •  Insert peripheral IV **AND** sodium chloride  •  sodium chloride      Assessment/Plan   Assessment / Plan     Active Hospital Problems    Diagnosis  POA   • Perinephric abscess [N15.1]  Yes   • Peripheral vascular disease (CMS/HCC) [I73.9]  Yes   • S/P BKA (below knee amputation), left (CMS/HCC) [Z89.512]  Not Applicable   • Diabetic ulcer of right lower leg (CMS/HCC) [E11.622, L97.919]  Yes   • Type 2 diabetes mellitus with circulatory disorder and uncontrolled [E11.59]  Yes   • Hyperlipemia [E78.5]  Yes   • Essential hypertension [I10]  Yes      Resolved Hospital Problems   No resolved problems to display.        Brief Hospital Course to date:  Kendrick Crystal is a 49 male recently discharged from the hospital with a diagnosis of a perinephric abscess.  Patient was discharged on Invanz.  Today, patient presents with weakness, malaise, fatigue.  CT scan indicated the abscess if larger than previous scan 10/18/2019. ID recommends  changing Invanz to meropenem 500 mg IV every 6 hours. Patient is also presenting with S/S of sepsis.       Sepsis secondary to perinephric abscess  -klebsiella, continue zosyn and diflucan  -abscess drain care per Urology Dr Gutierres  -WBC count improved  -ID follwoing    RUQ pain  -no pain complaints today    Hypomag  -replace per protocol    Diabetes mellitus, poorly controlled  -continue basal/bolus, adjust as needed. Dietary indiscretion noted while inpatient making blood sugar more difficult to control   Component      Latest Ref Rng & Units 11/3/2019 11/3/2019 11/4/2019 11/4/2019           4:26 PM  7:02 PM  7:33 AM 11:06 AM   Glucose      70 - 130 mg/dL 138 (H) 108 161 (H) 132 (H)     Hypertension  -Continue amlodipine and metoprolol  -Hytrin 5 mg HS     Hx of C-diff  -loose stool resolved     Depression  -continue cymbalta 30 mg daily     Wound to the right ankle  -wound care    Generalized weakness  -encouraged participation with PT    Medical Non-compliance  -complicates care     DVT prophylaxis:  Heparin 5000 units SQ q 8 hours    Disposition: will need continued IV antibiotics, duration unclear currently    CODE STATUS:   Code Status and Medical Interventions:   Ordered at: 10/26/19 2039     Level Of Support Discussed With:    Patient     Code Status:    CPR     Medical Interventions (Level of Support Prior to Arrest):    Full         Electronically signed by CHINTAN Hamilton, 11/04/19, 5:46 PM.

## 2019-11-04 NOTE — PLAN OF CARE
Problem: Patient Care Overview  Goal: Plan of Care Review  Outcome: Ongoing (interventions implemented as appropriate)   11/04/19 2530   Coping/Psychosocial   Plan of Care Reviewed With patient;spouse   Plan of Care Review   Progress no change   OTHER   Outcome Summary Minimal GALO drain output this shift. No c/o pain. Refusing renvela but allowing heparin subq. Discussed refusal of therapy with patient and wife, emphasizing benefit of therapy toward patient's goal of transferring to [facility]. Pt stated he would think about whether or not he would participate in therapy in future.

## 2019-11-04 NOTE — PROGRESS NOTES
Continued Stay Note   [place]     Patient Name: Kendrick Crystal  MRN: [id number]  Today's Date: 11/4/2019    Admit Date: 10/26/2019    Discharge Plan     Row Name 11/04/19 0850       Plan    Plan  [facility]     Patient/Family in Agreement with Plan  yes    Plan Comments  Spoke to patient at bedside. Denies any needs at this time. CM will continue to follow.    Final Discharge Disposition Code  62 - inpatient rehab facility        Discharge Codes    No documentation.       Expected Discharge Date and Time     Expected Discharge Date Expected Discharge Time    Nov 4, 2019             Fadi Valentine RN

## 2019-11-04 NOTE — PLAN OF CARE
Problem: Fall Risk (Adult)  Goal: Identify Related Risk Factors and Signs and Symptoms  Outcome: Ongoing (interventions implemented as appropriate)   11/04/19 0150   Fall Risk (Adult)   Related Risk Factors (Fall Risk) fatigue/slow reaction;gait/mobility problems;sleep pattern alteration;environment unfamiliar   Signs and Symptoms (Fall Risk) presence of risk factors     Goal: Absence of Fall  Outcome: Ongoing (interventions implemented as appropriate)   11/04/19 0150   Fall Risk (Adult)   Absence of Fall achieves outcome       Problem: Patient Care Overview  Goal: Plan of Care Review  Outcome: Ongoing (interventions implemented as appropriate)   11/04/19 0150   Coping/Psychosocial   Plan of Care Reviewed With patient;spouse   Plan of Care Review   Progress improving   OTHER   Outcome Summary Pt A&O x4. VSS. RA. Farooq drain with minimal output thus far. Pt refusing subcut heparin. NS infusing at 100 mL/hr via RU picc line. No complaints during shift. Spouse remains at bedside. Will continue to monitor.        Problem: Skin Injury Risk (Adult)  Goal: Identify Related Risk Factors and Signs and Symptoms  Outcome: Ongoing (interventions implemented as appropriate)   11/04/19 0150   Skin Injury Risk (Adult)   Related Risk Factors (Skin Injury Risk) body weight extremes;hospitalization prolonged;mobility impaired;moisture     Goal: Skin Health and Integrity  Outcome: Ongoing (interventions implemented as appropriate)   11/04/19 0150   Skin Injury Risk (Adult)   Skin Health and Integrity making progress toward outcome       Problem: Diabetes, Type 2 (Adult)  Goal: Signs and Symptoms of Listed Potential Problems Will be Absent, Minimized or Managed (Diabetes, Type 2)   11/04/19 0150   Goal/Outcome Evaluation   Problems Assessed (Type 2 Diabetes) all   Problems Present (Type 2 Diabetes) none

## 2019-11-04 NOTE — PLAN OF CARE
Problem: Fall Risk (Adult)  Goal: Identify Related Risk Factors and Signs and Symptoms  Outcome: Ongoing (interventions implemented as appropriate)   11/04/19 1848   Fall Risk (Adult)   Related Risk Factors (Fall Risk) depression/anxiety;gait/mobility problems;environment unfamiliar;sleep pattern alteration   Signs and Symptoms (Fall Risk) presence of risk factors     Goal: Absence of Fall  Outcome: Ongoing (interventions implemented as appropriate)   11/04/19 1848   Fall Risk (Adult)   Absence of Fall achieves outcome       Problem: Skin Injury Risk (Adult)  Goal: Identify Related Risk Factors and Signs and Symptoms  Outcome: Ongoing (interventions implemented as appropriate)   11/04/19 1848   Skin Injury Risk (Adult)   Related Risk Factors (Skin Injury Risk) body weight extremes;hospitalization prolonged;infection;medical devices;mobility impaired;moisture;mechanical forces     Goal: Skin Health and Integrity  Outcome: Ongoing (interventions implemented as appropriate)   11/04/19 1848   Skin Injury Risk (Adult)   Skin Health and Integrity making progress toward outcome

## 2019-11-05 LAB
ANION GAP SERPL CALCULATED.3IONS-SCNC: 14 MMOL/L (ref 5–15)
BASOPHILS # BLD AUTO: 0.08 10*3/MM3 (ref 0–0.2)
BASOPHILS NFR BLD AUTO: 0.7 % (ref 0–1.5)
BUN BLD-MCNC: 12 MG/DL (ref 6–20)
BUN/CREAT SERPL: 24 (ref 7–25)
CALCIUM SPEC-SCNC: 8.7 MG/DL (ref 8.6–10.5)
CHLORIDE SERPL-SCNC: 101 MMOL/L (ref 98–107)
CO2 SERPL-SCNC: 25 MMOL/L (ref 22–29)
CREAT BLD-MCNC: 0.5 MG/DL (ref 0.76–1.27)
DEPRECATED RDW RBC AUTO: 46.7 FL (ref 37–54)
EOSINOPHIL # BLD AUTO: 0.51 10*3/MM3 (ref 0–0.4)
EOSINOPHIL NFR BLD AUTO: 4.2 % (ref 0.3–6.2)
ERYTHROCYTE [DISTWIDTH] IN BLOOD BY AUTOMATED COUNT: 15 % (ref 12.3–15.4)
GFR SERPL CREATININE-BSD FRML MDRD: >150 ML/MIN/1.73
GLUCOSE BLD-MCNC: 166 MG/DL (ref 65–99)
GLUCOSE BLDC GLUCOMTR-MCNC: 128 MG/DL (ref 70–130)
GLUCOSE BLDC GLUCOMTR-MCNC: 141 MG/DL (ref 70–130)
GLUCOSE BLDC GLUCOMTR-MCNC: 147 MG/DL (ref 70–130)
GLUCOSE BLDC GLUCOMTR-MCNC: 167 MG/DL (ref 70–130)
HCT VFR BLD AUTO: 30.9 % (ref 37.5–51)
HGB BLD-MCNC: 9.5 G/DL (ref 13–17.7)
IMM GRANULOCYTES # BLD AUTO: 0.1 10*3/MM3 (ref 0–0.05)
IMM GRANULOCYTES NFR BLD AUTO: 0.8 % (ref 0–0.5)
LYMPHOCYTES # BLD AUTO: 2.33 10*3/MM3 (ref 0.7–3.1)
LYMPHOCYTES NFR BLD AUTO: 19.2 % (ref 19.6–45.3)
MCH RBC QN AUTO: 26.5 PG (ref 26.6–33)
MCHC RBC AUTO-ENTMCNC: 30.7 G/DL (ref 31.5–35.7)
MCV RBC AUTO: 86.1 FL (ref 79–97)
MONOCYTES # BLD AUTO: 0.98 10*3/MM3 (ref 0.1–0.9)
MONOCYTES NFR BLD AUTO: 8.1 % (ref 5–12)
NEUTROPHILS # BLD AUTO: 8.11 10*3/MM3 (ref 1.7–7)
NEUTROPHILS NFR BLD AUTO: 67 % (ref 42.7–76)
NRBC BLD AUTO-RTO: 0.2 /100 WBC (ref 0–0.2)
O+P SPEC MICRO: NORMAL
O+P STL TRI STN: NORMAL
PLATELET # BLD AUTO: 402 10*3/MM3 (ref 140–450)
PMV BLD AUTO: 10.8 FL (ref 6–12)
POTASSIUM BLD-SCNC: 3.9 MMOL/L (ref 3.5–5.2)
RBC # BLD AUTO: 3.59 10*6/MM3 (ref 4.14–5.8)
SODIUM BLD-SCNC: 140 MMOL/L (ref 136–145)
WBC NRBC COR # BLD: 12.11 10*3/MM3 (ref 3.4–10.8)

## 2019-11-05 PROCEDURE — 25010000002 FLUCONAZOLE PER 200 MG: Performed by: INTERNAL MEDICINE

## 2019-11-05 PROCEDURE — 63710000001 INSULIN DETEMIR PER 5 UNITS: Performed by: INTERNAL MEDICINE

## 2019-11-05 PROCEDURE — 99233 SBSQ HOSP IP/OBS HIGH 50: CPT | Performed by: FAMILY MEDICINE

## 2019-11-05 PROCEDURE — 85025 COMPLETE CBC W/AUTO DIFF WBC: CPT | Performed by: NURSE PRACTITIONER

## 2019-11-05 PROCEDURE — 97535 SELF CARE MNGMENT TRAINING: CPT

## 2019-11-05 PROCEDURE — 25010000002 HEPARIN (PORCINE) PER 1000 UNITS: Performed by: NURSE PRACTITIONER

## 2019-11-05 PROCEDURE — 97110 THERAPEUTIC EXERCISES: CPT

## 2019-11-05 PROCEDURE — 82962 GLUCOSE BLOOD TEST: CPT

## 2019-11-05 PROCEDURE — 63710000001 INSULIN LISPRO (HUMAN) PER 5 UNITS: Performed by: NURSE PRACTITIONER

## 2019-11-05 PROCEDURE — 25010000002 PIPERACILLIN SOD-TAZOBACTAM PER 1 G: Performed by: INTERNAL MEDICINE

## 2019-11-05 PROCEDURE — 80048 BASIC METABOLIC PNL TOTAL CA: CPT | Performed by: NURSE PRACTITIONER

## 2019-11-05 RX ADMIN — FLUCONAZOLE 200 MG: 200 INJECTION, SOLUTION INTRAVENOUS at 08:12

## 2019-11-05 RX ADMIN — Medication 250 MG: at 20:52

## 2019-11-05 RX ADMIN — INSULIN DETEMIR 22 UNITS: 100 INJECTION, SOLUTION SUBCUTANEOUS at 20:54

## 2019-11-05 RX ADMIN — METOPROLOL TARTRATE 100 MG: 100 TABLET ORAL at 20:52

## 2019-11-05 RX ADMIN — INSULIN DETEMIR 22 UNITS: 100 INJECTION, SOLUTION SUBCUTANEOUS at 08:24

## 2019-11-05 RX ADMIN — TAZOBACTAM SODIUM AND PIPERACILLIN SODIUM 3.38 G: 375; 3 INJECTION, SOLUTION INTRAVENOUS at 08:13

## 2019-11-05 RX ADMIN — Medication 250 MG: at 08:13

## 2019-11-05 RX ADMIN — INSULIN LISPRO 5 UNITS: 100 INJECTION, SOLUTION INTRAVENOUS; SUBCUTANEOUS at 18:29

## 2019-11-05 RX ADMIN — TAZOBACTAM SODIUM AND PIPERACILLIN SODIUM 3.38 G: 375; 3 INJECTION, SOLUTION INTRAVENOUS at 16:19

## 2019-11-05 RX ADMIN — INSULIN LISPRO 5 UNITS: 100 INJECTION, SOLUTION INTRAVENOUS; SUBCUTANEOUS at 08:14

## 2019-11-05 RX ADMIN — NYSTATIN: 100000 CREAM TOPICAL at 08:12

## 2019-11-05 RX ADMIN — HEPARIN SODIUM 5000 UNITS: 5000 INJECTION INTRAVENOUS; SUBCUTANEOUS at 13:01

## 2019-11-05 RX ADMIN — AMLODIPINE BESYLATE 10 MG: 10 TABLET ORAL at 08:13

## 2019-11-05 RX ADMIN — SODIUM CHLORIDE, PRESERVATIVE FREE 10 ML: 5 INJECTION INTRAVENOUS at 20:53

## 2019-11-05 RX ADMIN — ASPIRIN 81 MG: 81 TABLET, COATED ORAL at 08:14

## 2019-11-05 RX ADMIN — SODIUM CHLORIDE, PRESERVATIVE FREE 10 ML: 5 INJECTION INTRAVENOUS at 08:15

## 2019-11-05 RX ADMIN — TERAZOSIN HYDROCHLORIDE ANHYDROUS 5 MG: 5 CAPSULE ORAL at 20:52

## 2019-11-05 RX ADMIN — DULOXETINE HYDROCHLORIDE 30 MG: 30 CAPSULE, DELAYED RELEASE ORAL at 08:14

## 2019-11-05 RX ADMIN — HEPARIN SODIUM 5000 UNITS: 5000 INJECTION INTRAVENOUS; SUBCUTANEOUS at 20:52

## 2019-11-05 RX ADMIN — SODIUM CHLORIDE 100 ML/HR: 9 INJECTION, SOLUTION INTRAVENOUS at 15:40

## 2019-11-05 RX ADMIN — INSULIN LISPRO 5 UNITS: 100 INJECTION, SOLUTION INTRAVENOUS; SUBCUTANEOUS at 12:59

## 2019-11-05 RX ADMIN — INSULIN LISPRO 2 UNITS: 100 INJECTION, SOLUTION INTRAVENOUS; SUBCUTANEOUS at 18:30

## 2019-11-05 RX ADMIN — METOPROLOL TARTRATE 100 MG: 100 TABLET ORAL at 12:09

## 2019-11-05 RX ADMIN — TAZOBACTAM SODIUM AND PIPERACILLIN SODIUM 3.38 G: 375; 3 INJECTION, SOLUTION INTRAVENOUS at 00:44

## 2019-11-05 NOTE — PLAN OF CARE
Problem: Patient Care Overview  Goal: Plan of Care Review  Outcome: Ongoing (interventions implemented as appropriate)   11/05/19 2433   Coping/Psychosocial   Plan of Care Reviewed With patient;spouse   Plan of Care Review   Progress no change   OTHER   Outcome Summary pt remains a/o, room air, NSR, VSS. no drainage noted on dressing where GALO drain was pulled, no c/o flank pain, no fever. linens changed today.

## 2019-11-05 NOTE — PLAN OF CARE
Problem: Fall Risk (Adult)  Goal: Identify Related Risk Factors and Signs and Symptoms  Outcome: Ongoing (interventions implemented as appropriate)   11/05/19 0355   Fall Risk (Adult)   Related Risk Factors (Fall Risk) gait/mobility problems;sleep pattern alteration;environment unfamiliar   Signs and Symptoms (Fall Risk) presence of risk factors     Goal: Absence of Fall  Outcome: Ongoing (interventions implemented as appropriate)   11/05/19 0355   Fall Risk (Adult)   Absence of Fall achieves outcome       Problem: Patient Care Overview  Goal: Plan of Care Review  Outcome: Ongoing (interventions implemented as appropriate)   11/05/19 0355   Coping/Psychosocial   Plan of Care Reviewed With patient;spouse   Plan of Care Review   Progress no change   OTHER   Outcome Summary Pt A&O x4. VSS. RA. Farooq drain pulled out around 1900. MD notified and was okay with leaving drain out. Sutures removed during shift and covered incision site with covaderm. No drainage noted. Pt has not had any c/o pain thus far during shift and appears to be resting comfortably. Will continue to monitor.        Problem: Skin Injury Risk (Adult)  Goal: Identify Related Risk Factors and Signs and Symptoms  Outcome: Ongoing (interventions implemented as appropriate)   11/05/19 0355   Skin Injury Risk (Adult)   Related Risk Factors (Skin Injury Risk) body weight extremes;hospitalization prolonged;mobility impaired;moisture     Goal: Skin Health and Integrity  Outcome: Ongoing (interventions implemented as appropriate)   11/05/19 0355   Skin Injury Risk (Adult)   Skin Health and Integrity making progress toward outcome       Problem: Diabetes, Type 2 (Adult)  Goal: Signs and Symptoms of Listed Potential Problems Will be Absent, Minimized or Managed (Diabetes, Type 2)  Outcome: Ongoing (interventions implemented as appropriate)   11/05/19 0355   Goal/Outcome Evaluation   Problems Assessed (Type 2 Diabetes) all   Problems Present (Type 2 Diabetes)  hyperglycemia

## 2019-11-05 NOTE — NURSING NOTE
1900:  SRNA reported GALO drain inadvertently removed by patient.  Assessed patient, drain and drain site.   Tubing and bulb suction appears intact with terminal end outside of patient.  Sutures still in place, insertion site covered by dressing.  Called APRN to advise of removal and also paged Dr. Gutierres.  Per Dr. Gutierres, verbal order to remove sutures received.  Next shift RN present and will remove sutures and dress site.

## 2019-11-05 NOTE — PROGRESS NOTES
[facility] Progress Note        Antibiotics:  Anti-Infectives (From admission, onward)    Ordered     Dose/Rate Route Frequency Start Stop    19 0848  fluconazole (DIFLUCAN) IVPB 200 mg     Ordering Provider:  Usman Dong MD    200 mg  100 mL/hr over 60 Minutes Intravenous Daily 19 0945 19 0859    10/30/19 0725  fluconazole (DIFLUCAN) IVPB 200 mg     Ordering Provider:  Usman Dong MD    200 mg  over 60 Minutes Intravenous Daily 10/30/19 0900 19 1028    10/28/19 0743  piperacillin-tazobactam (ZOSYN) 3.375 g in iso-osmotic dextrose 50 ml (premix)     Ordering Provider:  Usman Dong MD    3.375 g  over 4 Hours Intravenous Every 8 Hours 10/28/19 1700 19 1659    10/28/19 0743  piperacillin-tazobactam (ZOSYN) 3.375 g in iso-osmotic dextrose 50 ml (premix)     Ordering Provider:  Usman Dong MD    3.375 g  over 30 Minutes Intravenous Once 10/28/19 1100 10/28/19 1303    10/26/19 2040  meropenem (MERREM) 1 g/100 mL 0.9% NS VTB (mbp)     Ordering Provider:  Susan Infante APRN    1 g  over 30 Minutes Intravenous Once 10/26/19 2130 10/26/19 2241          CC: fatigue    HPI:    Patient is a 49 y.o.  Yr old male with history of poorly contolled T2DM, DUNLAP/liver cirrhosis, HTN, PVD/Left BKA, and multiple skin abscesses/MRSA who presented to [facility] ED with right shin wound infection summer 2018.  He was unable to get to see Dr. Martinez as his father passed away unexpectedly on 18 and he had to wait until after the .  The wound worsened becoming gangrenous and he came to [facility] ED in 2018.  He also had been hospitalized at [facility] and then transferred to  for a severe penis/scrotum infection requiring surgical debridement in summer 2018.  He received antibiotics regarding his right leg infection, generally improved over the remainder of summer 2018 but that area had not completely healed. He was admitted 2019 with acute left  lower abdominal wall redness/swelling and pain, spontaneous drainage associated with fever/chills and uncontrolled blood sugars.  4/26 I&D requiring wide debridement , severe/deep infection and transferred to [facility] on May 3 with IV Zosyn/oral doxycycline. Cultures had lactobacillus and mixed gram-positive skin sherly including alpha strep, staph epidermidis and corynebacterium. Readmitted to [facility] May 18, 2019, increasing generalized edema ultimately transitioned to oral doxycycline and healed.     He presented to the emergency room July 30, 2019 with acute rectal pain that had begun 7/27; this is associated with constipation for days, chills and nausea/dry heaving.  White blood cell count elevated, high hemoglobin A1c over 13, urinalysis with hematuria/pyuria and CT scan with 3 x 3 cm fluid collection anterior to the distal rectum and per discussion with Dr. Akbar/Jennifer, this is not in the prostate.  Colorectal surgery/urology saw him for consideration of surgical options/timing/threshold, etc..     8/2/19 I&Dper Dr Payne perirectal abscess; patient reports that he had instrumented his rectum prior to hospitalization with enema     8/3/19 urinary retention overnight requiring in/out catheterization per patient.  Creat climb     8/4/19 further creat climb; childs placed and lisinopril stopped and d/w Dr Rubio;  ?obstruction initially associated with retention postop (1200 out with I/O cath postop per nursing) -v- contrast from CT -v- lisinopril/medication/vancomycin -v- relative hypotension -v- likely combination of factors with ATN; nephrology following; vancomycin trough high and vancomycin stopped empirically 8/3 although high trough potentially accumulation as a consequence of diminishing renal function associated with retention/contrast/pressure rather than cause.  Nonetheless, avoid for now; creatinine trending down.        8/7 in retrospect he remembers air in his urine at admit which  "has raised concern for possible fistula from urinary tract;  No fecaluria and culture from abscess is fecal sherly;  ?rectal-urethral fistula;  Dr Payne aware     Culture with E. coli/Klebsiella species and creatinine generally trended down, transitioned to oral antibiotics at discharge.     Readmitted August 17, 2019 with nausea/vomiting/dry heaves for 3 days.  Walker catheter was placed and CT scan of the abdomen showed:      \"Previously seen fluid collection identified inferior to the prostate gland is more increased in density on today's examination. There is wall thickening again seen throughout the bladder. Findings again are concerning for cystitis.\" per radiology     He was transitioned to oral omnicef/topical antifungal on approx 8/23/19; Noncompliant with f/u in our office     READMITTED 10/8/19 RLQ abd pain, urinary retention, nausea, dysuria and generalized fatigue     UTI by U/A, subsequent blood cultures positive for  kleb sp, urine with kleb and e coli ;  Abnormal CT abd with right perinephric fluid collection, advanced cirrhosis, urinary bladder thickening.  10/9 Aspirate of perinephric fluid collection consistent with abscess/kleb and white blood cells; 10/16/19 cytoscopy with bullous change per Dr Gutierres but no fistula per him; 10/19/19 intermittent nausea, no vomiting or dry heaves this am, intermittent dry cough broadened to zosyn with ?aspiration pneumonia evolving after dry heaves/vomiting and had intermittent diarrhea, oral vancomycin added empirically with history of prior C. Difficile; improved with IV Zosyn/oral vancomycin and he refused consideration of placement.  He insisted on home, discharged October 23 and noncompliant with outpatient therapy and outpatient follow-up October 25.  He had become fatigued such that his family could not assist him out of bed and it was recommended by my office that he return to the emergency room October 25.  He apparently refused that but did come to the " emergency room October 26.     Readmitted October 26, 2019 with generalized weakness, fatigue/malaise and nausea/vomiting/diarrhea.  CT scan revealed increased size of perinephric fluid collection per radiology.     On October 28, patient had reported increased diarrhea, at least 6 episodes overnight and watery/thin but no hematochezia melena or hematemesis.  Vomiting has subsided     10/29 drain placed    11/5/19 drain inadvertently out overnight per him; GI habits variable, some vomiting; stool consistency varies and not always diarrhea per family; vague abdominal discomfort that he describes as dull, intermittent, nonradiating, worse with palpation, generally better with pain meds and 3 out of 10.  No dysuria hematuria or pyuria.  Denies new hesitancy urgency or flank pain.      No headache photophobia or neck stiffness.  No shortness of breath cough or hemoptysis.  No fevers chills or sweats.  No rash.  No other particular exposures     ROS:      11/5/19 No f/c/s. No vomiting. No rash. No new ADR to Abx.     Constitutional-- No Fever, chills or sweats.  Appetite diminished with generalized malaise and fatigue  Heent-- No new vision, hearing or throat complaints.  No epistaxis or oral sores.  Denies odynophagia or dysphagia.  No flashers, floaters or eye pain. No odynophagia or dysphagia. No headache, photophobia or neck stiffness.  CV-- No chest pain, palpitation or syncope  Resp-- No SOB/cough/Hemoptysis  GI-as above.  No hematochezia, melena, or hematemesis. Denies jaundice or chronic liver disease.  -- No dysuria, hematuria, or flank pain.  Denies hesitancy or flank pain.  Lymph- no swollen lymph nodes in neck/axilla or groin.   Heme- No active bruising or bleeding; no Hx of DVT or PE.  MS-- no swelling or pain in the bones or joints of arms/legs.  No new back pain.  Neuro-- No acute focal weakness or numbness in the arms or legs.  No seizures.     Full 12 point review of systems reviewed and negative  "otherwise for acute complaints, except for above      PE:   /91 (BP Location: Left arm, Patient Position: Lying)   Pulse 79   Temp 97.9 °F (36.6 °C) (Oral)   Resp 18   Ht 190.5 cm (75\")   Wt 121 kg (267 lb)   SpO2 98%   BMI 33.37 kg/m²     GENERAL: Awake and alert, in no acute distress.   HEENT: Normocephalic, atraumatic.  PERRL. EOMI. No conjunctival injection. No icterus. Oropharynx clear without evidence of thrush or exudate. No evidence of peridontal disease.    NECK: Supple without nuchal rigidity. No mass.  LYMPH: No cervical, axillary or inguinal lymphadenopathy.  HEART: RRR; No murmur, rubs, gallops.   LUNGS: Diminished at bases to auscultation bilaterally with slight rhonchi bilateral but no wheezing or rales. Normal respiratory effort. Nonlabored. No dullness.  ABDOMEN: Soft, nontender, nondistended. Positive bowel sounds. No rebound or guarding. NO mass or HSM.  EXT:  No cyanosis, clubbing or edema. No cord.  : vague erythema/sattelite lesions c/w cutaneous candidiasis  MSK: FROM without joint effusions noted arms/legs.    SKIN: Warm and dry without cutaneous eruptions on Inspection/palpation.    NEURO: Oriented to PPT. No focal deficits on motor/sensory exam at arms/legs.     No peripheral stigmata/phenomena of endocarditis     PICC line without obvious redness or drainage     Laboratory Data    Results from last 7 days   Lab Units 11/05/19  0457 11/03/19  0804 11/02/19  0841   WBC 10*3/mm3 12.11* 11.83* 14.38*   HEMOGLOBIN g/dL 9.5* 9.2* 9.0*   HEMATOCRIT % 30.9* 29.5* 30.2*   PLATELETS 10*3/mm3 402 359 337     Results from last 7 days   Lab Units 11/05/19  0457   SODIUM mmol/L 140   POTASSIUM mmol/L 3.9   CHLORIDE mmol/L 101   CO2 mmol/L 25.0   BUN mg/dL 12   CREATININE mg/dL 0.50*   GLUCOSE mg/dL 166*   CALCIUM mg/dL 8.7     Results from last 7 days   Lab Units 11/03/19  0804   ALK PHOS U/L 232*   BILIRUBIN mg/dL <0.2*   ALT (SGPT) U/L 17   AST (SGOT) U/L 20               Estimated " Creatinine Clearance: 245 mL/min (A) (by C-G formula based on SCr of 0.5 mg/dL (L)).      Microbiology:      Radiology:  Imaging Results (last 72 hours)     Procedure Component Value Units Date/Time    CT Abdomen Pelvis With Contrast [[id number]] Collected:  10/26/19 2035     Updated:  10/26/19 2037    Narrative:       CT ABDOMEN AND PELVIS WITH CONTRAST, 10/26/2019    HISTORY:  49-year-old male with recent history of a right perinephric fluid collection that underwent diagnostic needle aspiration 10/9/2019. He has been on IV antibiotic therapy for possible perinephric abscess. Examination is requested today for follow-up.    TECHNIQUE:  CT imaging of the abdomen and pelvis with IV contrast. Radiation dose reduction techniques included automated exposure control. Radiation audit for CT and nuclear cardiology exams in the last 12 months: 0.    ABDOMEN FINDINGS:  Rim-enhancing pericapsular or subcapsular fluid collection along the posterior margin of the lower pole right kidney has enlarged since 10/18/2019. It previously measured about 5.7 x 3.9 cm axially and currently measures 6.0 x 4.8 cm in the same location  (see axial image 48). There is associated perinephric soft tissue stranding, most likely inflammatory. The findings are compatible with clinically suspected perinephric abscess.    Both kidneys enhance normally. There is no evidence of upper urinary tract obstruction.    Liver, pancreas and spleen are within normal limits. No bile duct or pancreatic duct dilatation.    Small bowel and colon are normal in caliber and appearance without GI contrast. Normal appendix. No gastric distention, hiatal hernia or distal esophageal dilatation.    PELVIS FINDINGS:  Urinary bladder, prostate and rectum are within normal limits.    Lung base images show a tiny right pleural effusion.      Impression:       1.  Likely perinephric or subcapsular abscess along the posterior aspect of the lower pole right kidney. This has  enlarged since 10/18/2019.  2.  Normal renal parenchymal enhancement. No CT evidence of pyelonephritis at this time. No evidence of upper urinary tract obstruction.  3.  Tiny right pleural effusion.    Signer Name: Fadi Healy MD   Signed: 10/26/2019 8:35 PM   Workstation Name: [email or web address]    [facility]            Impression:      --Acute Kleb septicemia/sepsis on treatment since last admit, likely urinary source/pyelnoephritis associated with urinary retention and  with abnormal CT scan with perinephric collection/abscess and Kleb on aspirate that has persisted as of aspirate 10/29 arguing relatively sequestered focus not penetrated by systemic abx and now with drain placed 10/29;  IV  Zosyn ongoing; urology to determine any timing/option or threshold for further intervention such as surgical debridement if not responsive to drain/abx, or other functional/anatomic assessment.  DRAIN INADVERTENTLY OUT 11/4;  Monitor clinically, consider repeat CT it evaluate adequacy of drainage;  D/w Dr Forbes     --Acute perinephric abscess as above;   Urology following to help guide timing/option/threshold for further drainage (perc -v- other);  Perc drain 10/29 and kleb persists in culture    --urinary frequency with cutaneous candidiasis and candiduria/pyruria;  2 brief courses diflucan     --abnormal imaging with dry cough and probable pneumonia last admission;  At risk for aspiration with recent vomiting/dry heaves and empiric zosyn started 10/19; changed to Invanz 10/23 to help facilitate home therapy with mom and changed back to Zosyn October 28;   no productive cough at present, but if it develops, then send for culture     --acute  diarrhea 10/27-10/28 with Hx CDiff per him/family and recent empiric oral vancomycin, CDT negative and oral vancomycin stopped;  If recurrent diarrhea then consider more wrolup or empiric therapy     --Hx perirectal abscess ; Colorectal surgery,   [facility] previously saw and is following.  Drainage as above on August 2 with mxed Fecal sherly in culture; CT scans  with no mention of abscess on 8/18 study;   any timing/option or threshold for further GI/colorectal work-up per Dr holley/JACKSON     --History urinary retention.  urology following     --Hx ARF in August 2019;  ?obstruction initially associated with retention postop (1200 out with I/O cath postop per nursing) -v- contrast from CT -v- lisinopril/medication/vancomycin -v- relative hypotension -v- likely combination of factors with ATN;  vancomycin trough high and vancomycin stopped empirically 8/3 although high trough potentially accumulation as a consequence of diminishing renal function associated with retention/contrast/pressure rather than cause.  Nonetheless, avoid for now; likely further acute kidney injury at admission August 17 associated with dehydration/prerenal/ATN etiology     --History MRSA;  Not in current culture     --Diabetes mellitus type 2, uncontrolled.  He reports the reason for this is forgetfulness     --History Johnston and chronic liver disease/cirrhosis  per past notes     --Left BKA     --Peripheral vascular disease     --medical noncompliance and inability to care for self at home.       PLAN:      --IV Zosyn; IV diflucan      --Check/review labs cultures and scans     --Discussed with microbiology     --History per nursing staff      --Discussed with family, partial history per them     --Highly complex set of issues with high risk for further serious morbidity and other serious sequela     --Colorectal surgery and urology have followed; urology following to determine timing/option or threshold for further percutaneous aspiration/drainage versus other surgery regarding  abscess     --D/w Dr Andrei Dong MD  11/5/2019

## 2019-11-05 NOTE — THERAPY TREATMENT NOTE
Acute Care - Occupational Therapy Treatment Note  [facility]     Patient Name: Kendrick Crystal  : 1970  MRN: [id number]  Today's Date: 2019     Date of Referral to OT: 10/27/19  Referring Physician: MD Elliot    Admit Date: 10/26/2019       ICD-10-CM ICD-9-CM   1. Perinephric abscess N15.1 590.2   2. History of sepsis Z86.19 V12.09   3. History of bacteremia Z87.898 V12.09   4. DM (diabetes mellitus) type II uncontrolled, periph vascular disorder (CMS/[facility]) E11.51 250.72    E11.65 443.81   5. Essential hypertension I10 401.9   6. S/P BKA (below knee amputation), left (CMS/HCC) Z89.512 V49.75   7. Impaired mobility and ADLs Z74.09 799.89     Patient Active Problem List   Diagnosis   • DUNLAP Cirrhosis   • Essential hypertension   • Non-compliance   • Hyperlipemia   • Hypertriglyceridemia, essential   • Sepsis affecting skin   • Type 2 diabetes mellitus with circulatory disorder and uncontrolled   • History of MRSA infection   • Diabetic ulcer of right lower leg (CMS/[facility])   • S/P BKA (below knee amputation), left (CMS/[facility])   • Peripheral vascular disease (CMS/[facility])   • DM (diabetes mellitus) type II uncontrolled, periph vascular disorder (CMS/[facility])   • Obesity (BMI 30-39.9)   • Gastroparesis   • Perinephric abscess   • Acute UTI (urinary tract infection)   • Bacteremia due to Klebsiella pneumoniae     Past Medical History:   Diagnosis Date   • Abdominal wall cellulitis 2019   • JUAN (acute kidney injury) (CMS/[facility]) 2018   • Arthritis    • Bipolar 1 disorder (CMS/[facility])    • Cellulitis of right anterior lower leg 2018    WITH MRSA   • Cirrhosis (CMS/[facility])    • Counseling for insulin pump    • Depression    • Diabetes mellitus (CMS/[facility])    • Encephalopathy, hepatic (CMS/[facility])    • H/O degenerative disc disease    • History of Lissa's gangrene     right leg/testicle   • Hyperlipemia    • Hypertension    • multiple Skin abscesses    • DUNLAP, bx showed stage IV fibrosis    • Neuropathy    • Peripheral  vascular disease (CMS/HCC)    • Thrombophlebitis      Past Surgical History:   Procedure Laterality Date   • ABDOMINAL WALL ABSCESS INCISION AND DRAINAGE N/A 4/26/2019    Procedure: ABDOMINAL WALL DEBRIDEMENT;  Surgeon: Fadi Kern MD;  Location:  [facility];  Service: General   • AMPUTATION DIGIT Left 1/30/2017    Procedure: left fourth and fifth transmetatarsal toe amputation ;  Surgeon: Juancho Martinez MD;  Location:  [facility];  Service:    • BELOW KNEE AMPUTATION Left 3/2/2017    Procedure: AMPUTATION BELOW KNEE, SHMUEL;  Surgeon: Juancho Martinez MD;  Location:  [facility];  Service:    • CYSTOSCOPY N/A 10/16/2019    Procedure: CYSTOSCOPY;  Surgeon: Brad Gutierres MD;  Location:  [facility];  Service: Urology   • ENDOSCOPY     • HEMORRHOIDECTOMY N/A 8/2/2019    Procedure: EXCISION AND DRAIN PERIRECTAL ABSCESS;  Surgeon: Mumtaz Payne MD;  Location:  [facility];  Service: General   • LEG SURGERY     • TESTICLE SURGERY Right     DEBRIDEMENT FROM GANGRENE   • TONSILLECTOMY  1975       Therapy Treatment    Rehabilitation Treatment Summary     Row Name 11/05/19 1504             Treatment Time/Intention    Discipline  occupational therapist  -TB      Document Type  therapy note (daily note)  -TB      Subjective Information  no complaints  -TB      Mode of Treatment  individual therapy  -TB      Patient/Family Observations  Family at bedside and encouraging pt  -TB      Care Plan Review  care plan/treatment goals reviewed;patient/other agree to care plan  -TB      Patient Effort  adequate  -TB      Existing Precautions/Restrictions  fall  -TB      Treatment Considerations/Comments  L BKA  -TB      Recorded by [TB] Areli Nichols OT 11/05/19 1534      Row Name 11/05/19 1504             Vital Signs    Pre Systolic BP Rehab  -- RN cleared OT  -TB      O2 Delivery Post Treatment  room air  -TB      Pre Patient Position  Supine  -TB      Intra Patient Position  Sitting  -TB      Post Patient Position  Supine   "-TB      Recorded by [TB] Areli Nichols OT 11/05/19 1534      Row Name 11/05/19 1504             Cognitive Assessment/Intervention- PT/OT    Affect/Mental Status (Cognitive)  flat/blunted affect  -TB      Behavioral Issues (Cognitive)  other (see comments) reluctant to particpate; moves quickly to \"get it done\"  -TB      Orientation Status (Cognition)  oriented to;person;place;situation  -TB      Attention Deficit (Cognitive)  mild deficit  -TB      Executive Function Deficit (Cognition)  moderate deficit  -TB      Safety Deficit (Cognitive)  moderate deficit  -TB      Recorded by [TB] Areli Nichols OT 11/05/19 1534      Row Name 11/05/19 1504             Safety Issues, Functional Mobility    Safety Issues Affecting Function (Mobility)  awareness of need for assistance;insight into deficits/self awareness;safety precaution awareness;safety precautions follow-through/compliance  -TB      Impairments Affecting Function (Mobility)  balance;endurance/activity tolerance;strength;postural/trunk control  -TB      Recorded by [TB] Areli Nichols OT 11/05/19 1534      Row Name 11/05/19 1504             Bed Mobility Assessment/Treatment    Rolling Left Antelope (Bed Mobility)  supervision  -TB      Rolling Right [place]elope (Bed Mobility)  supervision  -TB      Scooting/Bridging Antelope (Bed Mobility)  minimum assist (75% patient effort)  -TB      Supine-Sit Antelope (Bed Mobility)  minimum assist (75% patient effort)  -TB      Sit-Supine [place]elope (Bed Mobility)  supervision  -TB      Assistive Device (Bed Mobility)  bed rails  -TB      Recorded by [TB] Areli Nichols OT 11/05/19 1534      Row Name 11/05/19 1504             Transfer Assessment/Treatment    Comment (Transfers)  Pt refused  -TB      Recorded by [TB] Areli Nichols OT 11/05/19 1534      Row Name 11/05/19 1504             ADL Assessment/Intervention    47472 - OT Self Care/Mgmt Minutes  8  -TB      " BADL Assessment/Intervention  grooming;lower body dressing  -TB2      Recorded by [TB] Areli Nichols, OT 11/05/19 1534  [TB2] Areli Nichols, OT 11/05/19 1535      Row Name 11/05/19 1504             Lower Body Dressing Assessment/Training    Lower Body Dressing Tyrone Level  doff;don;socks;supervision R Sock  -TB      Lower Body Dressing Position  edge of bed sitting  -TB      Recorded by [TB] Areli Nichols, OT 11/05/19 1535      Row Name 11/05/19 1504             Grooming Assessment/Training    T[place]one Level (Grooming)  set up;supervision;wash face, hands;hair care, combing/brushing  -TB      Grooming Position  edge of bed sitting  -TB      Comment (Grooming)  cues to attend to task  -TB      Recorded by [TB] Areli Nichols, OT 11/05/19 1534      Row Name 11/05/19 1504             BADL Safety/Performance    Impairments, BADL Safety/Performance  balance;endurance/activity tolerance;range of motion;strength;trunk/postural control  -TB      Recorded by [TB] Areli Nichols, OT 11/05/19 1534      Row Name 11/05/19 1504             Motor Skills Assessment/Interventions    Additional Documentation  Balance (Group);Therapeutic Exercise (Group)  -TB      Recorded by [TB] Areli Nichols, OT 11/05/19 1534      Row Name 11/05/19 1504             Therapeutic Exercise    Therapeutic Exercise  seated, upper extremities;supine, lower extremities  -TB      Additional Documentation  Therapeutic Exercise (Row)  -TB      33303 - OT Therapeutic Exercise Minutes  15  -TB      Recorded by [TB] Areli Nichols, OT 11/05/19 1534      Row Name 11/05/19 1504             Upper Extremity Seated Therapeutic Exercise    Performed, Seated Upper Extremity (Therapeutic Exercise)  shoulder flexion/extension;shoulder abduction/adduction;shoulder horizontal abduction/adduction;elbow flexion/extension;digit flexion/extension  -TB      Exercise Type, Seated Upper Extremity (Therapeutic  Exercise)  AROM (active range of motion)  -TB      Restrictions, Seated Upper Extremity (Therapeutic Exercise)  L shoulder pain with ROM >90 degrees FE  -TB      Sets/Reps Detail, Seated Upper Extremity (Therapeutic Exercise)  3x5  -TB      Comment, Seated Upper Extremity (Therapeutic Exercise)  Education reinforced for benefits of OOB/dynamic activity/therapy, role of OT and HEP to support ADLs.   -TB      Recorded by [TB] Areli Nichols, OT 11/05/19 1534      Row Name 11/05/19 1504             Lower Extremity Supine Therapeutic Exercise    Performed, Supine Lower Extremity (Therapeutic Exercise)  gluteal sets;quadriceps sets;SLR (straight leg raise)  -TB      Exercise Type, Supine Lower Extremity (Therapeutic Exercise)  AROM (active range of motion)  -TB      Sets/Reps Detail, Supine Lower Extremity (Therapeutic Exercise)  3x5  -TB      Recorded by [TB] Areli Nichols, OT 11/05/19 1534      Row Name 11/05/19 1504             Balance    Balance  static sitting balance;dynamic sitting balance  -TB      Recorded by [TB] Areli Nichols, OT 11/05/19 1534      Row Name 11/05/19 1504             Static Sitting Balance    Level of [place] (Unsupported Sitting, Static Balance)  standby assist  -TB      Sitting Position (Unsupported Sitting, Static Balance)  sitting on edge of bed  -TB      Time Able to Maintain Position (Unsupported Sitting, Static Balance)  more than 5 minutes  -TB      Recorded by [TB] Areli Nichols, OT 11/05/19 1534      Row Name 11/05/19 1504             Dynamic Sitting Balance    Level of [place], Reaches Outside Midline (Sitting, Dynamic Balance)  minimal assist, 75% patient effort  -TB      Sitting Position, Reaches Outside Midline (Sitting, Dynamic Balance)  sitting on edge of bed  -TB      Comment, Reaches Outside Midline (Sitting, Dynamic Balance)  scooting along EOB, HEP,  ADL tasks  -TB      Recorded by [TB] Areli Nichols, OT 11/05/19 1534       Row Name 11/05/19 1504             Positioning and Restraints    Pre-Treatment Position  in bed  -TB      Post Treatment Position  bed  -TB      In Bed  fowlers;call light within reach;encouraged to call for assist;exit alarm on specialty bed  -TB      Recorded by [TB] Areli Nichols, OT 11/05/19 1534      Row Name 11/05/19 1504             Pain Scale: Numbers Pre/Post-Treatment    Pain Scale: Numbers, Pretreatment  0/10 - no pain  -TB      Pain Scale: Numbers, Post-Treatment  0/10 - no pain  -TB      Pain Intervention(s)  Ambulation/increased activity;Repositioned  -TB      Recorded by [TB] Areli Nichols, OT 11/05/19 1534      Row Name                Wound 10/27/19 0945 Right lateral ankle Abrasion    Wound - Properties Group Date first assessed: 10/27/19 [CP] Time first assessed: 0945 [CP] Present on Hospital Admission: Y [CP] Side: Right [CP] Orientation: lateral [CP] Location: ankle [CP] Primary Wound Type: Abrasion [CP] Recorded by:  [CP] Alivia Cespedes APRN 10/27/19 1016    Row Name                Wound 11/04/19 1915 Right lateral flank Incision    Wound - Properties Group Date first assessed: 11/04/19 [LN] Time first assessed: 1915 [LN] Side: Right [LN] Orientation: lateral [LN] Location: flank [LN] Primary Wound Type: Incision [LN], from GALO drain  Recorded by:  [LN] Louann Colunga RN 11/05/19 1347    Row Name 11/05/19 1504             Coping    Observed Emotional State  cooperative with encouragement  -TB      Verbalized Emotional State  acceptance  -TB      Recorded by [TB] Areli Nichols OT 11/05/19 1534      Row Name 11/05/19 1504             Plan of Care Review    Plan of Care Reviewed With  patient;spouse  -TB      Recorded by [TB] Areli Nichols OT 11/05/19 1534      Row Name 11/05/19 1504             Outcome Summary/Treatment Plan (OT)    Daily Summary of Progress (OT)  progress toward functional goals is gradual  -TB      Barriers to Overall Progress (OT)   self-limiting behaviors  -TB      Plan for Continued Treatment (OT)  per POC, consider 3x/week pending participation  -TB      Anticipated Discharge Disposition (OT)  skilled nursing facility  -TB      Recorded by [TB] Areli Nichols, OT 11/05/19 1534        User Key  (r) = Recorded By, (t) = Taken By, (c) = Cosigned By    Initials Name Effective Dates Discipline    TB Areli Nichols, OT 06/08/18 -  OT    Alivia Fiore APRN 07/24/19 -  Nurse    Louann Carbajal RN 10/16/17 -  Nurse        Wound 10/27/19 0991 Right lateral ankle Abrasion (Active)   Dressing Appearance dry;intact 11/5/2019  8:15 AM   Closure Adhesive bandage 11/5/2019  8:15 AM   Base dressing in place, unable to visualize 11/5/2019  8:15 AM   Periwound intact;pink;blanchable 11/5/2019  8:15 AM   Periwound Temperature warm 11/5/2019  8:15 AM   Periwound Skin Turgor soft 11/5/2019  8:15 AM   Edges callused 11/5/2019  8:15 AM   Drainage Amount none 11/5/2019  8:15 AM   Dressing Care, Wound low-adherent 11/5/2019  8:15 AM   Periwound Care, Wound dry periwound area maintained 11/5/2019  4:13 AM       Wound 11/04/19 1915 Right lateral flank Incision (Active)   Dressing Appearance dry;no drainage;intact 11/5/2019 10:00 AM   Closure KALYN 11/5/2019 10:00 AM   Base dressing in place, unable to visualize 11/5/2019 10:00 AM   Periwound dry 11/5/2019 10:00 AM   Periwound Temperature warm 11/5/2019 10:00 AM   Periwound Skin Turgor soft 11/5/2019 10:00 AM   Drainage Amount none 11/5/2019 10:00 AM   Dressing Care, Wound border dressing 11/5/2019 10:00 AM   Periwound Care, Wound dry periwound area maintained 11/5/2019 10:00 AM       Occupational Therapy Education     Title: PT OT SLP Therapies (In Progress)     Topic: Occupational Therapy (Done)     Point: ADL training (Done)     Description: Instruct learner(s) on proper safety adaptation and remediation techniques during self care or transfers.   Instruct in proper use of assistive  devices.    Learning Progress Summary           Patient Acceptance, E,D,TB, VU,DU,NR by VIVIAN at 11/5/2019  3:35 PM    Comment:  Education reinforced for benefits of OOB/dynamic activity/therapy, role of OT and HEP to support ADLs.    Nonacceptance, E, NR by AN at 10/31/2019  3:44 PM    Comment:  Educated on importance, benefits vs consequences of getting OOB and increasing therapeutic ex/activities.    Acceptance, E,D, VU,DU by CYNDI at 10/29/2019  2:30 PM    Comment:  Pt educated on HEP.   Significant Other Acceptance, E,D,TB, VU,DU,NR by VIVIAN at 11/5/2019  3:35 PM    Comment:  Education reinforced for benefits of OOB/dynamic activity/therapy, role of OT and HEP to support ADLs.                   Point: Home exercise program (Done)     Description: Instruct learner(s) on appropriate technique for monitoring, assisting and/or progressing therapeutic exercises/activities.    Learning Progress Summary           Patient Acceptance, E,D,TB, VU,DU,NR by VIVIAN at 11/5/2019  3:35 PM    Comment:  Education reinforced for benefits of OOB/dynamic activity/therapy, role of OT and HEP to support ADLs.    Acceptance, E,D, VU,DU by CYNDI at 11/1/2019  4:01 PM    Comment:  Pt educated on role of OT, safety, fall prevention, ADLsm and HEP.    Nonacceptance, E, NR by AN at 10/31/2019  3:44 PM    Comment:  Educated on importance, benefits vs consequences of getting OOB and increasing therapeutic ex/activities.    Acceptance, E,D, VU,DU by CYNDI at 10/29/2019  2:30 PM    Comment:  Pt educated on HEP.   Significant Other Acceptance, E,D,TB, VU,DU,NR by TB at 11/5/2019  3:35 PM    Comment:  Education reinforced for benefits of OOB/dynamic activity/therapy, role of OT and HEP to support ADLs.                   Point: Precautions (Done)     Description: Instruct learner(s) on prescribed precautions during self-care and functional transfers.    Learning Progress Summary           Patient Acceptance, E,D, VU,DU by CYNDI at 11/1/2019  4:01 PM    Comment:  Pt  educated on role of OT, safety, fall prevention, ADLsm and HEP.    Acceptance, TB,E, VU,NR by  at 11/1/2019 11:13 AM    Acceptance, E,D, VU,DU by CYNDI at 10/29/2019  2:30 PM    Comment:  Pt educated on HEP.   Significant Other Acceptance, TB,E, VU,NR by  at 11/1/2019 11:13 AM                   Point: Body mechanics (Done)     Description: Instruct learner(s) on proper positioning and spine alignment during self-care, functional mobility activities and/or exercises.    Learning Progress Summary           Patient Acceptance, E,D, VU,DU by CYNDI at 11/1/2019  4:01 PM    Comment:  Pt educated on role of OT, safety, fall prevention, ADLsm and HEP.    Acceptance, TB,E, VU,NR by  at 11/1/2019 11:13 AM    Acceptance, E,D, VU,DU by  at 10/29/2019  2:30 PM    Comment:  Pt educated on HEP.   Significant Other Acceptance, TB,E, VU,NR by  at 11/1/2019 11:13 AM                               User Key     Initials Effective Dates Name Provider Type Discipline     06/08/18 -  Areli Nichols OT Occupational Therapist OT     06/22/15 -  Fela Anguiano OT Occupational Therapist OT     07/17/19 -  Roz Galvan OT Occupational Therapist OT     10/15/19 -  Stephanie Mead RN Registered Nurse Nurse                OT Recommendation and Plan  Outcome Summary/Treatment Plan (OT)  Daily Summary of Progress (OT): progress toward functional goals is gradual  Barriers to Overall Progress (OT): self-limiting behaviors  Plan for Continued Treatment (OT): per POC, consider 3x/week pending participation  Anticipated Discharge Disposition (OT): skilled nursing facility  Daily Summary of Progress (OT): progress toward functional goals is gradual  Plan of Care Review  Plan of Care Reviewed With: patient, spouse  Plan of Care Reviewed With: patient, spouse  Outcome Summary: Pt reluctantly agreeable to therapy. Min A bed mobility. Min A dynamic sitting balance with EOB sitting. HEP completed BUE sitting/RLE supine. ADL tasks  completed with SBA. OT will continue to follow IP.  Outcome Measures     Row Name 11/05/19 1504             How much help from another is currently needed...    Putting on and taking off regular lower body clothing?  2  -TB      Bathing (including washing, rinsing, and drying)  2  -TB      Toileting (which includes using toilet bed pan or urinal)  2  -TB      Putting on and taking off regular upper body clothing  3  -TB      Taking care of personal grooming (such as brushing teeth)  3  -TB      Eating meals  3  -TB      AM-PAC 6 Clicks Score (OT)  15  -TB         Functional Assessment    Outcome Measure Options  AM-PAC 6 Clicks Daily Activity (OT)  -TB        User Key  (r) = Recorded By, (t) = Taken By, (c) = Cosigned By    Initials Name Provider Type    Areli Lemus OT Occupational Therapist           Time Calculation:   Time Calculation- OT     Row Name 11/05/19 1538 11/05/19 1504          Time Calculation- OT    OT Start Time  1504  -TB  --     OT Received On  11/05/19  -TB  --     OT Goal Re-Cert Due Date  11/06/19  -TB  --        Timed Charges    23548 - OT Therapeutic Exercise Minutes  --  15  -TB     24609 - OT Self Care/Mgmt Minutes  --  8  -TB       User Key  (r) = Recorded By, (t) = Taken By, (c) = Cosigned By    Initials Name Provider Type    Areli Lemus OT Occupational Therapist        Therapy Charges for Today     Code Description Service Date Service Provider Modifiers Qty    [id number]  OT THER PROC EA 15 MIN 11/5/2019 Areli Nichols OT GO 1    [id number]  OT SELF CARE/MGMT/TRAIN EA 15 MIN 11/5/2019 Areli Nichols OT GO 1               Areli Nichols OT  11/5/2019

## 2019-11-05 NOTE — PROGRESS NOTES
[facility] Medicine Services  PROGRESS NOTE    Patient Name: Kendrick Crystal  : 1970  MRN: [id number]    Date of Admission: 10/26/2019  Primary Care Physician: Provider, No Known    Subjective   Subjective     CC:  Infection    HPI:  Patient is a 49-year-old with complicated hospital course admitted for recurrent Klebsiella bacteremia with perinephric abscess, status post drain (inadvertently removed on 2019).  Patient states he feels fine as of right now.  His drain was accidentally dislodged yesterday.  I discussed his case with Dr. Pino and agreed and close monitoring with repeat CT scan after 24 hours or sooner if he gets sick.  Patient is tolerating p.o. well.  He denies nausea vomiting or constipation.  He is ambulating some.  He denies new concerns.    Review of Systems  General: No fever, chills, fatigue  ENT: No sore throat, trouble swallowing or changes in vision  Respiratory: No shortness of breath, cough, wheezing or fast breathing  Cardiovascular: No chest pain, palpitations, dyspnea with exertion  Gastrointestinal: No nausea vomiting, positive abdominal pain  Musculoskeletal: No difficulty walking, some weakness  Vascular: No cyanosis or clubbing  Lymphatic: No peripheral edema, no lymphadenopathy  Neurologic: No headache, confusion, dizziness  Psychiatric: No changes in mood.  No depression or anxiety.    Objective   Objective     Vital Signs:   Temp:  [97.1 °F (36.2 °C)-98.3 °F (36.8 °C)] 97.9 °F (36.6 °C)  Heart Rate:  [79-92] 79  Resp:  [18] 18  BP: (144-169)/(90-99) 144/91        Physical Exam:  Constitutional: No acute distress, awake, alert, nontoxic, overweight body habitus  Eyes: Pupils equal, sclerae anicteric, no conjunctival injection  HENT: NCAT, mucous membranes moist  Neck: Supple, no thyromegaly, no lymphadenopathy  Respiratory: Clear to auscultation bilaterally, good effort, nonlabored respirations   Cardiovascular: RRR  Gastrointestinal:  Positive bowel sounds, soft, mildly tender, mildly distended, obese  Musculoskeletal: No peripheral edema, normal muscle tone for age  Psychiatric: Appropriate affect, good insight and judgement, cooperative  Neurologic: Oriented x 3, movements symmetric BUE and BLE, Cranial Nerves grossly intact to confrontation, speech clear and fluent    Results Reviewed:    Results from last 7 days   Lab Units 11/05/19 0457 11/03/19  0804 11/02/19  0841   WBC 10*3/mm3 12.11* 11.83* 14.38*   HEMOGLOBIN g/dL 9.5* 9.2* 9.0*   HEMATOCRIT % 30.9* 29.5* 30.2*   PLATELETS 10*3/mm3 402 359 337     Results from last 7 days   Lab Units 11/05/19 0457 11/03/19  0804 11/02/19  0841 10/31/19  0659   SODIUM mmol/L 140 138 137 135*   POTASSIUM mmol/L 3.9 3.7 4.3 3.9   CHLORIDE mmol/L 101 102 105 101   CO2 mmol/L 25.0 25.0 24.0 24.0   BUN mg/dL 12 11 14 8   CREATININE mg/dL 0.50* 0.45* 0.51* 0.44*   GLUCOSE mg/dL 166* 227* 158* 114*   CALCIUM mg/dL 8.7 8.6 8.3* 8.2*   ALT (SGPT) U/L  --  17 14 19   AST (SGOT) U/L  --  20 11 19     Estimated Creatinine Clearance: 245 mL/min (A) (by C-G formula based on SCr of 0.5 mg/dL (L)).    Microbiology Results Abnormal     Procedure Component Value - Date/Time    Stool Culture (Reference Lab) - Stool, Per Rectum [[id number]] Collected:  10/30/19 0507    Lab Status:  Final result Specimen:  Stool from Per Rectum Updated:  11/04/19 1708     Salmonella/Shigella Screen Final report     Result 1 Comment     Comment: No Salmonella or Shigella recovered.        Campylobacter Culture Final report     Result 1 Comment     Comment: No Campylobacter species isolated.        E coli, Shiga toxin Assay Negative    Narrative:       Performed at:  [address] [place] [address]  [id number]  : Dl Laird PhD, Phone:  [phone]    Gastrointestinal Panel, PCR ([facility]) - Stool, Per Rectum [[id number]] Collected:  10/30/19 0507    Lab Status:  Final result Specimen:  Stool from Per Rectum  Updated:  11/01/19 1357     Reference Lab Report See Attached Report     Comment: See scanned report       Blood Culture - Blood, Wrist, Right [[id number]] Collected:  10/26/19 1805    Lab Status:  Final result Specimen:  Blood from Wrist, Right Updated:  10/31/19 2000     Blood Culture No growth at 5 days    Blood Culture - Blood, Hand, Right [[id number]] Collected:  10/26/19 1822    Lab Status:  Final result Specimen:  Blood from Hand, Right Updated:  10/31/19 2000     Blood Culture No growth at 5 days    Body Fluid Culture - Body Fluid, Kidney, Right [[id number]]  (Abnormal)  (Susceptibility) Collected:  10/29/19 1057    Lab Status:  Final result Specimen:  Body Fluid from Kidney, Right Updated:  10/31/19 0624     Body Fluid Culture Scant growth (1+) Klebsiella pneumoniae ssp pneumoniae     Gram Stain Moderate (3+) WBCs seen      No organisms seen    Susceptibility      Klebsiella pneumoniae ssp pneumoniae     EYAD     Ampicillin Resistant     Ampicillin + Sulbactam Susceptible     Cefazolin Susceptible     Cefepime Susceptible     Ceftazidime Susceptible     Ceftriaxone Susceptible     Gentamicin Susceptible     Levofloxacin Susceptible     Piperacillin + Tazobactam Susceptible     Trimethoprim + Sulfamethoxazole Susceptible                    Clostridium Difficile Toxin - Stool, Per Rectum [[id number]] Collected:  10/30/19 0507    Lab Status:  Final result Specimen:  Stool from Per Rectum Updated:  10/30/19 0842    Narrative:       The following orders were created for panel order Clostridium Difficile Toxin - Stool, Per Rectum.  Procedure                               Abnormality         Status                     ---------                               -----------         ------                     Clostridium Difficile To...[[id number]]  Normal              Final result                 Please view results for these tests on the individual orders.    Clostridium Difficile Toxin, PCR - Stool, Per Rectum [[id number]]   (Normal) Collected:  10/30/19 0507    Lab Status:  Final result Specimen:  Stool from Per Rectum Updated:  10/30/19 0826     C. Difficile Toxins by PCR Not Detected    Narrative:       Performance characteristics of test not established for patients <2 years of age.  Negative for Toxigenic C. Difficile    Urine Culture - Urine, Urine, Clean Catch [[id number]]  (Abnormal) Collected:  10/28/19 1341    Lab Status:  Final result Specimen:  Urine, Clean Catch Updated:  10/29/19 1033     Urine Culture Yeast isolated          Imaging Results (Last 24 Hours)     ** No results found for the last 24 hours. **          Results for orders placed during the hospital encounter of 05/18/19   Adult Transthoracic Echo Complete W/ Cont if Necessary Per Protocol    Narrative · Estimated EF = 60%.  · Mild mitral valve regurgitation is present  · Mild tricuspid valve regurgitation is present.  · Calculated right ventricular systolic pressure from tricuspid   regurgitation is 29 mmHg.          I have reviewed the medications:  Scheduled Meds:  amLODIPine 10 mg Oral Q24H   aspirin 81 mg Oral Daily   DULoxetine 30 mg Oral Daily   fluconazole 200 mg Intravenous Daily   heparin (porcine) 5,000 Units Subcutaneous Q8H   insulin detemir 22 Units Subcutaneous Q12H   insulin lispro 0-7 Units Subcutaneous 4x Daily With Meals & Nightly   insulin lispro 5 Units Subcutaneous TID With Meals   metoprolol tartrate 100 mg Oral Q12H   nystatin  Topical BID   piperacillin-tazobactam 3.375 g Intravenous Q8H   saccharomyces boulardii 250 mg Oral BID   sevelamer 800 mg Oral TID With Meals   sodium chloride 10 mL Intravenous Q12H   terazosin 5 mg Oral Nightly     Continuous Infusions:  sodium chloride 100 mL/hr Last Rate: 100 mL/hr (11/04/19 1647)     PRN Meds:.•  acetaminophen **OR** acetaminophen **OR** acetaminophen  •  dextrose  •  dextrose  •  glucagon (human recombinant)  •  magnesium sulfate **OR** magnesium sulfate **OR** magnesium sulfate  •   ondansetron  •  potassium chloride **OR** potassium chloride **OR** potassium chloride  •  Insert peripheral IV **AND** sodium chloride  •  sodium chloride      Assessment/Plan   Assessment / Plan     Active Hospital Problems    Diagnosis  POA   • Perinephric abscess [N15.1]  Yes   • Peripheral vascular disease (CMS/[facility]) [I73.9]  Yes   • S/P BKA (below knee amputation), left (CMS/[facility]) [Z89.512]  Not Applicable   • Diabetic ulcer of right lower leg (CMS/[facility]) [E11.622, L97.919]  Yes   • Type 2 diabetes mellitus with circulatory disorder and uncontrolled [E11.59]  Yes   • Hyperlipemia [E78.5]  Yes   • Essential hypertension [I10]  Yes      Resolved Hospital Problems   No resolved problems to display.        Brief Hospital Course to date:  Kendrick Crystal is a 49 y.o. male admitted with acute sepsis secondary to recurrent klebsiella bacteremia with perinephric abscess, status post drain (inadvertently removed on 11/4/2019).    Sepsis secondary to perinephric abscess  -klebsiella, continue zosyn and diflucan  -Dr. Dong follows  -abscess drain care per Urology Dr Gutierres, inadvertently removed on 11/4/2019  -Plan to monitor for 24 hours then repeat CT as discussed with infectious disease     Hypomag  -replace per protocol     Diabetes mellitus, poorly controlled  -continue basal/bolus, adjust as needed.     Hypertension  -Continue amlodipine and metoprolol  -Hytrin 5 mg HS     Hx of C-diff  -loose stool resolved     Depression  -continue cymbalta 30 mg daily     Wound to the right ankle  -wound care     Generalized weakness  -encouraged participation with PT     Medical Non-compliance  -complicates care    More than 50% of time spent counseling on current illness and plan of care. Case discussed with: Dr. Dong patient and nursing team.  Total time spent face to face with the patient was 20 minutes.  Total time of the encounter was 35 minutes.      DVT Prophylaxis: Heparin      CODE STATUS:   Code Status and Medical  Interventions:   Ordered at: 10/26/19 2039     Level Of Support Discussed With:    Patient     Code Status:    CPR     Medical Interventions (Level of Support Prior to Arrest):    Full         Electronically signed by Jerri Forbes MD, 11/05/19, 9:50 AM.

## 2019-11-05 NOTE — PLAN OF CARE
Problem: Patient Care Overview  Goal: Plan of Care Review  Outcome: Ongoing (interventions implemented as appropriate)   11/05/19 8449   Coping/Psychosocial   Plan of Care Reviewed With patient;spouse   OTHER   Outcome Summary Pt reluctantly agreeable to therapy. Min A bed mobility. Min A dynamic sitting balance with EOB sitting. HEP completed BUE sitting/RLE supine. ADL tasks completed with SBA. OT will continue to follow IP.

## 2019-11-05 NOTE — PROGRESS NOTES
Continued Stay Note   Roman     Patient Name: Kendrick Crystal  MRN: [id number]  Today's Date: 11/5/2019    Admit Date: 10/26/2019    Discharge Plan     Row Name 11/05/19 0834       Plan    Plan  [facility]    Patient/Family in Agreement with Plan  yes    Plan Comments  Spoke with patient at bedside. Encouraged patient to work with PT but he is reluctant. Denies any needs at this time. CM will continue to follow.    Final Discharge Disposition Code  62 - inpatient rehab facility        Discharge Codes    No documentation.       Expected Discharge Date and Time     Expected Discharge Date Expected Discharge Time    Nov 6, 2019             Fadi Valentine RN

## 2019-11-06 LAB
ANION GAP SERPL CALCULATED.3IONS-SCNC: 10 MMOL/L (ref 5–15)
BASOPHILS # BLD AUTO: 0.11 10*3/MM3 (ref 0–0.2)
BASOPHILS NFR BLD AUTO: 1 % (ref 0–1.5)
BUN BLD-MCNC: 10 MG/DL (ref 6–20)
BUN/CREAT SERPL: 20 (ref 7–25)
CALCIUM SPEC-SCNC: 8.1 MG/DL (ref 8.6–10.5)
CHLORIDE SERPL-SCNC: 102 MMOL/L (ref 98–107)
CO2 SERPL-SCNC: 23 MMOL/L (ref 22–29)
CREAT BLD-MCNC: 0.5 MG/DL (ref 0.76–1.27)
DEPRECATED RDW RBC AUTO: 49 FL (ref 37–54)
EOSINOPHIL # BLD AUTO: 0.63 10*3/MM3 (ref 0–0.4)
EOSINOPHIL NFR BLD AUTO: 5.5 % (ref 0.3–6.2)
ERYTHROCYTE [DISTWIDTH] IN BLOOD BY AUTOMATED COUNT: 15.5 % (ref 12.3–15.4)
GFR SERPL CREATININE-BSD FRML MDRD: >150 ML/MIN/1.73
GLUCOSE BLD-MCNC: 226 MG/DL (ref 65–99)
GLUCOSE BLDC GLUCOMTR-MCNC: 107 MG/DL (ref 70–130)
GLUCOSE BLDC GLUCOMTR-MCNC: 209 MG/DL (ref 70–130)
GLUCOSE BLDC GLUCOMTR-MCNC: 98 MG/DL (ref 70–130)
HCT VFR BLD AUTO: 29.5 % (ref 37.5–51)
HGB BLD-MCNC: 9 G/DL (ref 13–17.7)
IMM GRANULOCYTES # BLD AUTO: 0.08 10*3/MM3 (ref 0–0.05)
IMM GRANULOCYTES NFR BLD AUTO: 0.7 % (ref 0–0.5)
LYMPHOCYTES # BLD AUTO: 2.14 10*3/MM3 (ref 0.7–3.1)
LYMPHOCYTES NFR BLD AUTO: 18.7 % (ref 19.6–45.3)
MCH RBC QN AUTO: 26.9 PG (ref 26.6–33)
MCHC RBC AUTO-ENTMCNC: 30.5 G/DL (ref 31.5–35.7)
MCV RBC AUTO: 88.1 FL (ref 79–97)
MONOCYTES # BLD AUTO: 0.97 10*3/MM3 (ref 0.1–0.9)
MONOCYTES NFR BLD AUTO: 8.5 % (ref 5–12)
NEUTROPHILS # BLD AUTO: 7.5 10*3/MM3 (ref 1.7–7)
NEUTROPHILS NFR BLD AUTO: 65.6 % (ref 42.7–76)
NRBC BLD AUTO-RTO: 0.2 /100 WBC (ref 0–0.2)
PLATELET # BLD AUTO: 373 10*3/MM3 (ref 140–450)
PMV BLD AUTO: 11 FL (ref 6–12)
POTASSIUM BLD-SCNC: 4.1 MMOL/L (ref 3.5–5.2)
RBC # BLD AUTO: 3.35 10*6/MM3 (ref 4.14–5.8)
SODIUM BLD-SCNC: 135 MMOL/L (ref 136–145)
WBC NRBC COR # BLD: 11.43 10*3/MM3 (ref 3.4–10.8)

## 2019-11-06 PROCEDURE — 63710000001 INSULIN DETEMIR PER 5 UNITS: Performed by: INTERNAL MEDICINE

## 2019-11-06 PROCEDURE — 25010000002 FLUCONAZOLE PER 200 MG: Performed by: INTERNAL MEDICINE

## 2019-11-06 PROCEDURE — 80048 BASIC METABOLIC PNL TOTAL CA: CPT | Performed by: FAMILY MEDICINE

## 2019-11-06 PROCEDURE — 99232 SBSQ HOSP IP/OBS MODERATE 35: CPT | Performed by: FAMILY MEDICINE

## 2019-11-06 PROCEDURE — 63710000001 INSULIN LISPRO (HUMAN) PER 5 UNITS: Performed by: NURSE PRACTITIONER

## 2019-11-06 PROCEDURE — 85025 COMPLETE CBC W/AUTO DIFF WBC: CPT | Performed by: FAMILY MEDICINE

## 2019-11-06 PROCEDURE — 82962 GLUCOSE BLOOD TEST: CPT

## 2019-11-06 PROCEDURE — 25010000002 HEPARIN (PORCINE) PER 1000 UNITS: Performed by: NURSE PRACTITIONER

## 2019-11-06 PROCEDURE — 25010000002 PIPERACILLIN SOD-TAZOBACTAM PER 1 G: Performed by: INTERNAL MEDICINE

## 2019-11-06 RX ORDER — DULOXETIN HYDROCHLORIDE 60 MG/1
60 CAPSULE, DELAYED RELEASE ORAL DAILY
Status: DISCONTINUED | OUTPATIENT
Start: 2019-11-07 | End: 2019-11-15 | Stop reason: HOSPADM

## 2019-11-06 RX ADMIN — INSULIN LISPRO 2 UNITS: 100 INJECTION, SOLUTION INTRAVENOUS; SUBCUTANEOUS at 21:49

## 2019-11-06 RX ADMIN — SODIUM CHLORIDE, PRESERVATIVE FREE 10 ML: 5 INJECTION INTRAVENOUS at 21:49

## 2019-11-06 RX ADMIN — TERAZOSIN HYDROCHLORIDE ANHYDROUS 5 MG: 5 CAPSULE ORAL at 21:46

## 2019-11-06 RX ADMIN — DULOXETINE HYDROCHLORIDE 30 MG: 30 CAPSULE, DELAYED RELEASE ORAL at 08:33

## 2019-11-06 RX ADMIN — NYSTATIN: 100000 CREAM TOPICAL at 13:12

## 2019-11-06 RX ADMIN — INSULIN LISPRO 5 UNITS: 100 INJECTION, SOLUTION INTRAVENOUS; SUBCUTANEOUS at 08:45

## 2019-11-06 RX ADMIN — Medication 250 MG: at 21:46

## 2019-11-06 RX ADMIN — Medication 250 MG: at 08:32

## 2019-11-06 RX ADMIN — INSULIN LISPRO 5 UNITS: 100 INJECTION, SOLUTION INTRAVENOUS; SUBCUTANEOUS at 13:12

## 2019-11-06 RX ADMIN — METOPROLOL TARTRATE 100 MG: 100 TABLET ORAL at 21:46

## 2019-11-06 RX ADMIN — TAZOBACTAM SODIUM AND PIPERACILLIN SODIUM 3.38 G: 375; 3 INJECTION, SOLUTION INTRAVENOUS at 00:18

## 2019-11-06 RX ADMIN — HEPARIN SODIUM 5000 UNITS: 5000 INJECTION INTRAVENOUS; SUBCUTANEOUS at 08:32

## 2019-11-06 RX ADMIN — ASPIRIN 81 MG: 81 TABLET, COATED ORAL at 08:33

## 2019-11-06 RX ADMIN — NYSTATIN: 100000 CREAM TOPICAL at 21:50

## 2019-11-06 RX ADMIN — FLUCONAZOLE 200 MG: 200 INJECTION, SOLUTION INTRAVENOUS at 08:32

## 2019-11-06 RX ADMIN — SODIUM CHLORIDE, PRESERVATIVE FREE 10 ML: 5 INJECTION INTRAVENOUS at 13:14

## 2019-11-06 RX ADMIN — AMLODIPINE BESYLATE 10 MG: 10 TABLET ORAL at 08:33

## 2019-11-06 RX ADMIN — TAZOBACTAM SODIUM AND PIPERACILLIN SODIUM 3.38 G: 375; 3 INJECTION, SOLUTION INTRAVENOUS at 08:31

## 2019-11-06 RX ADMIN — METOPROLOL TARTRATE 100 MG: 100 TABLET ORAL at 08:33

## 2019-11-06 RX ADMIN — INSULIN DETEMIR 22 UNITS: 100 INJECTION, SOLUTION SUBCUTANEOUS at 20:45

## 2019-11-06 RX ADMIN — TAZOBACTAM SODIUM AND PIPERACILLIN SODIUM 3.38 G: 375; 3 INJECTION, SOLUTION INTRAVENOUS at 17:33

## 2019-11-06 RX ADMIN — INSULIN DETEMIR 22 UNITS: 100 INJECTION, SOLUTION SUBCUTANEOUS at 08:44

## 2019-11-06 RX ADMIN — HEPARIN SODIUM 5000 UNITS: 5000 INJECTION INTRAVENOUS; SUBCUTANEOUS at 15:35

## 2019-11-06 NOTE — PROGRESS NOTES
Continued Stay Note   [facility]     Patient Name: Kendrick Crystal  MRN: [id number]  Today's Date: 11/6/2019    Admit Date: 10/26/2019    Discharge Plan     Row Name 11/06/19 0846       Plan    Plan  [facility]    Patient/Family in Agreement with Plan  yes    Plan Comments  Spoke with patient at bedside. encouraged patient to work with PT. Denies any needs at this time. CM will continue to follow.    Final Discharge Disposition Code  62 - inpatient rehab facility        Discharge Codes    No documentation.       Expected Discharge Date and Time     Expected Discharge Date Expected Discharge Time    Nov 6, 2019             Fadi Valentine RN

## 2019-11-06 NOTE — PROGRESS NOTES
[facility] Medicine Services  PROGRESS NOTE    Patient Name: Kendrick Crystal  : 1970  MRN: [id number]    Date of Admission: 10/26/2019  Primary Care Physician: Provider, No Known    Subjective   Subjective     CC:  Infection    HPI:  Patient is a 49-year-old with complicated hospital course admitted for recurrent Klebsiella bacteremia with perinephric abscess, status post drain (inadvertently removed on 2019).     - Patient states he feels fine as of right now.  His drain was accidentally dislodged yesterday.  I discussed his case with Dr. Dong and agreed and close monitoring with repeat CT scan after 24 hours or sooner if he gets sick.  Patient is tolerating p.o. well.  He denies nausea vomiting or constipation.  He is ambulating some.  He denies new concerns.     -patient denies new complaints.  He has continued to have intermittent air from his urethra.  His wife states she was told to monitor this by the urologist as well as Dr. Payne.  No fever.  Labs are pending this morning.  Will discuss timing of ordering the repeat CT scan with Dr. Dong.  He denies any increased pain.    Review of Systems  General: No fever, chills, fatigue  ENT: No sore throat, trouble swallowing or changes in vision  Respiratory: No shortness of breath, cough, wheezing or fast breathing  Cardiovascular: No chest pain, palpitations, dyspnea with exertion  Gastrointestinal: No nausea vomiting, positive abdominal pain  Musculoskeletal: Positive difficulty walking due to weakness  Vascular: No cyanosis or clubbing  Lymphatic: No peripheral edema, no lymphadenopathy  Neurologic: No headache, confusion, dizziness  Psychiatric: No changes in mood.  No depression or anxiety.    Objective   Objective     Vital Signs:   Temp:  [97.2 °F (36.2 °C)-98.2 °F (36.8 °C)] 97.5 °F (36.4 °C)  Heart Rate:  [75-84] 75  Resp:  [18] 18  BP: (150-161)/() 161/93        Physical Exam:  Constitutional: No  acute distress, awake, alert, nontoxic, overweight body habitus  Eyes: Pupils equal, sclerae anicteric, no conjunctival injection  HENT: NCAT, mucous membranes moist  Neck: Supple, no thyromegaly, no lymphadenopathy  Respiratory: Clear to auscultation bilaterally, good effort, nonlabored respirations   Cardiovascular: RRR  Gastrointestinal: Positive bowel sounds, soft, mildly tender, mildly distended, obese  Musculoskeletal: No peripheral edema, normal muscle tone for age  Psychiatric: Appropriate affect, good insight and judgement, cooperative  Neurologic: Oriented x 3, movements symmetric BUE and BLE, Cranial Nerves grossly intact to confrontation, speech clear and fluent    Results Reviewed:    Results from last 7 days   Lab Units 11/05/19 0457 11/03/19  0804 11/02/19  0841   WBC 10*3/mm3 12.11* 11.83* 14.38*   HEMOGLOBIN g/dL 9.5* 9.2* 9.0*   HEMATOCRIT % 30.9* 29.5* 30.2*   PLATELETS 10*3/mm3 402 359 337     Results from last 7 days   Lab Units 11/05/19 0457 11/03/19  0804 11/02/19  0841 10/31/19  0659   SODIUM mmol/L 140 138 137 135*   POTASSIUM mmol/L 3.9 3.7 4.3 3.9   CHLORIDE mmol/L 101 102 105 101   CO2 mmol/L 25.0 25.0 24.0 24.0   BUN mg/dL 12 11 14 8   CREATININE mg/dL 0.50* 0.45* 0.51* 0.44*   GLUCOSE mg/dL 166* 227* 158* 114*   CALCIUM mg/dL 8.7 8.6 8.3* 8.2*   ALT (SGPT) U/L  --  17 14 19   AST (SGOT) U/L  --  20 11 19     Estimated Creatinine Clearance: 245 mL/min (A) (by C-G formula based on SCr of 0.5 mg/dL (L)).    Microbiology Results Abnormal     Procedure Component Value - Date/Time    Ova & Parasite Examination - Stool, Per Rectum [[id number]] Collected:  10/30/19 050    Lab Status:  Final result Specimen:  Stool from Per Rectum Updated:  11/05/19 6953     Ova + Parasite Exam No ova or parasites seen on concentrated smear     Trichrome Stain No ova or parasites seen on trichrome stain    Stool Culture (Reference Lab) - Stool, Per Rectum [[id number]] Collected:  10/30/19 0827    Lab Status:   Final result Specimen:  Stool from Per Rectum Updated:  11/04/19 1708     Salmonella/Shigella Screen Final report     Result 1 Comment     Comment: No Salmonella or Shigella recovered.        Campylobacter Culture Final report     Result 1 Comment     Comment: No Campylobacter species isolated.        E coli, Shiga toxin Assay Negative    Narrative:       Performed at:  [address] [place] [address]  [id number]  : Dl Laird PhD, Phone:  [phone]    Gastrointestinal Panel, PCR ([facility]) - Stool, Per Rectum [[id number]] Collected:  10/30/19 0507    Lab Status:  Final result Specimen:  Stool from Per Rectum Updated:  11/01/19 1357     Reference Lab Report See Attached Report     Comment: See scanned report       Blood Culture - Blood, Wrist, Right [[id number]] Collected:  10/26/19 1805    Lab Status:  Final result Specimen:  Blood from Wrist, Right Updated:  10/31/19 2000     Blood Culture No growth at 5 days    Blood Culture - Blood, Hand, Right [[id number]] Collected:  10/26/19 1822    Lab Status:  Final result Specimen:  Blood from Hand, Right Updated:  10/31/19 2000     Blood Culture No growth at 5 days    Body Fluid Culture - Body Fluid, Kidney, Right [[id number]]  (Abnormal)  (Susceptibility) Collected:  10/29/19 1057    Lab Status:  Final result Specimen:  Body Fluid from Kidney, Right Updated:  10/31/19 0624     Body Fluid Culture Scant growth (1+) Klebsiella pneumoniae ssp pneumoniae     Gram Stain Moderate (3+) WBCs seen      No organisms seen    Susceptibility      Klebsiella pneumoniae ssp pneumoniae     EYAD     Ampicillin Resistant     Ampicillin + Sulbactam Susceptible     Cefazolin Susceptible     Cefepime Susceptible     Ceftazidime Susceptible     Ceftriaxone Susceptible     Gentamicin Susceptible     Levofloxacin Susceptible     Piperacillin + Tazobactam Susceptible     Trimethoprim + Sulfamethoxazole Susceptible                    Clostridium Difficile Toxin -  Stool, Per Rectum [[id number]] Collected:  10/30/19 0507    Lab Status:  Final result Specimen:  Stool from Per Rectum Updated:  10/30/19 0826    Narrative:       The following orders were created for panel order Clostridium Difficile Toxin - Stool, Per Rectum.  Procedure                               Abnormality         Status                     ---------                               -----------         ------                     Clostridium Difficile To...[[id number]]  Normal              Final result                 Please view results for these tests on the individual orders.    Clostridium Difficile Toxin, PCR - Stool, Per Rectum [[id number]]  (Normal) Collected:  10/30/19 0507    Lab Status:  Final result Specimen:  Stool from Per Rectum Updated:  10/30/19 0826     C. Difficile Toxins by PCR Not Detected    Narrative:       Performance characteristics of test not established for patients <2 years of age.  Negative for Toxigenic C. Difficile    Urine Culture - Urine, Urine, Clean Catch [[id number]]  (Abnormal) Collected:  10/28/19 1341    Lab Status:  Final result Specimen:  Urine, Clean Catch Updated:  10/29/19 1033     Urine Culture Yeast isolated          Imaging Results (Last 24 Hours)     ** No results found for the last 24 hours. **          Results for orders placed during the hospital encounter of 05/18/19   Adult Transthoracic Echo Complete W/ Cont if Necessary Per Protocol    Narrative · Estimated EF = 60%.  · Mild mitral valve regurgitation is present  · Mild tricuspid valve regurgitation is present.  · Calculated right ventricular systolic pressure from tricuspid   regurgitation is 29 mmHg.          I have reviewed the medications:  Scheduled Meds:    amLODIPine 10 mg Oral Q24H   aspirin 81 mg Oral Daily   DULoxetine 30 mg Oral Daily   fluconazole 200 mg Intravenous Daily   heparin (porcine) 5,000 Units Subcutaneous Q8H   insulin detemir 22 Units Subcutaneous Q12H   insulin lispro 0-7 Units  Subcutaneous 4x Daily With Meals & Nightly   insulin lispro 5 Units Subcutaneous TID With Meals   metoprolol tartrate 100 mg Oral Q12H   nystatin  Topical BID   piperacillin-tazobactam 3.375 g Intravenous Q8H   saccharomyces boulardii 250 mg Oral BID   sevelamer 800 mg Oral TID With Meals   sodium chloride 10 mL Intravenous Q12H   terazosin 5 mg Oral Nightly     Continuous Infusions:    sodium chloride 100 mL/hr Last Rate: 100 mL/hr (11/05/19 1540)     PRN Meds:.•  acetaminophen **OR** acetaminophen **OR** acetaminophen  •  dextrose  •  dextrose  •  glucagon (human recombinant)  •  magnesium sulfate **OR** magnesium sulfate **OR** magnesium sulfate  •  ondansetron  •  potassium chloride **OR** potassium chloride **OR** potassium chloride  •  Insert peripheral IV **AND** sodium chloride  •  sodium chloride      Assessment/Plan   Assessment / Plan     Active Hospital Problems    Diagnosis  POA   • Perinephric abscess [N15.1]  Yes   • Peripheral vascular disease (CMS/[facility]) [I73.9]  Yes   • S/P BKA (below knee amputation), left (CMS/[facility]) [Z89.512]  Not Applicable   • Diabetic ulcer of right lower leg (CMS/[facility]) [E11.622, L97.919]  Yes   • Type 2 diabetes mellitus with circulatory disorder and uncontrolled [E11.59]  Yes   • Hyperlipemia [E78.5]  Yes   • Essential hypertension [I10]  Yes      Resolved Hospital Problems   No resolved problems to display.        Brief Hospital Course to date:  Kendrick Crystal is a 49 y.o. male admitted with acute sepsis secondary to recurrent klebsiella bacteremia with perinephric abscess, status post drain (inadvertently removed on 11/4/2019).    Sepsis secondary to perinephric abscess  -klebsiella, continue zosyn and diflucan  -Dr. Dong follows  -abscess drain care per Urology Dr Gutierres, inadvertently removed on 11/4/2019  -Plan to monitor for 24-48 hours then repeat CT as discussed with infectious disease     Hypomag  -replace per protocol     Diabetes mellitus, poorly  controlled  -continue basal/bolus, adjust as needed.   -Glucose range 128-209    Hypertension  -Continue amlodipine and metoprolol  -Hytrin 5 mg HS     Hx of C-diff  -loose stool resolved     Depression  -continue cymbalta 30 mg daily     Wound to the right ankle  -wound care     Generalized weakness  -encouraged participation with PT     Medical Non-compliance  -complicates care      DVT Prophylaxis: Heparin      CODE STATUS:   Code Status and Medical Interventions:   Ordered at: 10/26/19 2039     Level Of Support Discussed With:    Patient     Code Status:    CPR     Medical Interventions (Level of Support Prior to Arrest):    Full         Electronically signed by Jerri Forbes MD, 11/06/19, 9:02 AM.

## 2019-11-06 NOTE — PLAN OF CARE
Problem: Fall Risk (Adult)  Goal: Identify Related Risk Factors and Signs and Symptoms  Outcome: Ongoing (interventions implemented as appropriate)   11/06/19 0124   Fall Risk (Adult)   Related Risk Factors (Fall Risk) gait/mobility problems;sleep pattern alteration;environment unfamiliar   Signs and Symptoms (Fall Risk) presence of risk factors     Goal: Absence of Fall  Outcome: Ongoing (interventions implemented as appropriate)   11/06/19 0124   Fall Risk (Adult)   Absence of Fall achieves outcome       Problem: Patient Care Overview  Goal: Plan of Care Review  Outcome: Ongoing (interventions implemented as appropriate)   11/06/19 0124   Coping/Psychosocial   Plan of Care Reviewed With patient;spouse   Plan of Care Review   Progress no change   OTHER   Outcome Summary Pt A&O x4. VSS. RA. No drainage noted on dressing of previous GALO drain incision. No c/o pain or nausea during shift thus far. Spouse remains at bedside. Will continue to monitor.        Problem: Skin Injury Risk (Adult)  Goal: Identify Related Risk Factors and Signs and Symptoms  Outcome: Ongoing (interventions implemented as appropriate)   11/06/19 0124   Skin Injury Risk (Adult)   Related Risk Factors (Skin Injury Risk) body weight extremes;hospitalization prolonged;mobility impaired;moisture     Goal: Skin Health and Integrity  Outcome: Ongoing (interventions implemented as appropriate)   11/06/19 0124   Skin Injury Risk (Adult)   Skin Health and Integrity making progress toward outcome       Problem: Diabetes, Type 2 (Adult)  Goal: Signs and Symptoms of Listed Potential Problems Will be Absent, Minimized or Managed (Diabetes, Type 2)  Outcome: Ongoing (interventions implemented as appropriate)   11/06/19 0124   Goal/Outcome Evaluation   Problems Assessed (Type 2 Diabetes) all   Problems Present (Type 2 Diabetes) hyperglycemia

## 2019-11-06 NOTE — PROGRESS NOTES
[facility] Progress Note        Antibiotics:  Anti-Infectives (From admission, onward)    Ordered     Dose/Rate Route Frequency Start Stop    19 0848  fluconazole (DIFLUCAN) IVPB 200 mg     Ordering Provider:  Usman Dong MD    200 mg  100 mL/hr over 60 Minutes Intravenous Daily 19 0945 19 0859    10/30/19 0725  fluconazole (DIFLUCAN) IVPB 200 mg     Ordering Provider:  Usman Dong MD    200 mg  over 60 Minutes Intravenous Daily 10/30/19 0900 19 1028    10/28/19 0743  piperacillin-tazobactam (ZOSYN) 3.375 g in iso-osmotic dextrose 50 ml (premix)     Ordering Provider:  Usman Dong MD    3.375 g  over 4 Hours Intravenous Every 8 Hours 10/28/19 1700 19 1659    10/28/19 0743  piperacillin-tazobactam (ZOSYN) 3.375 g in iso-osmotic dextrose 50 ml (premix)     Ordering Provider:  Usman Dong MD    3.375 g  over 30 Minutes Intravenous Once 10/28/19 1100 10/28/19 1303    10/26/19 2040  meropenem (MERREM) 1 g/100 mL 0.9% NS VTB (mbp)     Ordering Provider:  Susan Infante APRN    1 g  over 30 Minutes Intravenous Once 10/26/19 2130 10/26/19 2241          CC: fatigue    HPI:    Patient is a 49 y.o.  Yr old male with history of poorly contolled T2DM, DUNLAP/liver cirrhosis, HTN, PVD/Left BKA, and multiple skin abscesses/MRSA who presented to [facility] ED with right shin wound infection summer 2018.  He was unable to get to see Dr. Martinez as his father passed away unexpectedly on 18 and he had to wait until after the .  The wound worsened becoming gangrenous and he came to [facility] ED in 2018.  He also had been hospitalized at [facility] and then transferred to  for a severe penis/scrotum infection requiring surgical debridement in summer 2018.  He received antibiotics regarding his right leg infection, generally improved over the remainder of summer 2018 but that area had not completely healed. He was admitted 2019 with acute left  lower abdominal wall redness/swelling and pain, spontaneous drainage associated with fever/chills and uncontrolled blood sugars.  4/26 I&D requiring wide debridement , severe/deep infection and transferred to [facility] on May 3 with IV Zosyn/oral doxycycline. Cultures had lactobacillus and mixed gram-positive skin sherly including alpha strep, staph epidermidis and corynebacterium. Readmitted to [facility] May 18, 2019, increasing generalized edema ultimately transitioned to oral doxycycline and healed.     He presented to the emergency room July 30, 2019 with acute rectal pain that had begun 7/27; this is associated with constipation for days, chills and nausea/dry heaving.  White blood cell count elevated, high hemoglobin A1c over 13, urinalysis with hematuria/pyuria and CT scan with 3 x 3 cm fluid collection anterior to the distal rectum and per discussion with Dr. Akbar/Jennifer, this is not in the prostate.  Colorectal surgery/urology saw him for consideration of surgical options/timing/threshold, etc..     8/2/19 I&Dper Dr Payne perirectal abscess; patient reports that he had instrumented his rectum prior to hospitalization with enema     8/3/19 urinary retention overnight requiring in/out catheterization per patient.  Creat climb     8/4/19 further creat climb; childs placed and lisinopril stopped and d/w Dr Rubio;  ?obstruction initially associated with retention postop (1200 out with I/O cath postop per nursing) -v- contrast from CT -v- lisinopril/medication/vancomycin -v- relative hypotension -v- likely combination of factors with ATN; nephrology following; vancomycin trough high and vancomycin stopped empirically 8/3 although high trough potentially accumulation as a consequence of diminishing renal function associated with retention/contrast/pressure rather than cause.  Nonetheless, avoid for now; creatinine trending down.        8/7 in retrospect he remembers air in his urine at admit which  "has raised concern for possible fistula from urinary tract;  No fecaluria and culture from abscess is fecal sherly;  ?rectal-urethral fistula;  Dr Payne aware     Culture with E. coli/Klebsiella species and creatinine generally trended down, transitioned to oral antibiotics at discharge.     Readmitted August 17, 2019 with nausea/vomiting/dry heaves for 3 days.  Walker catheter was placed and CT scan of the abdomen showed:      \"Previously seen fluid collection identified inferior to the prostate gland is more increased in density on today's examination. There is wall thickening again seen throughout the bladder. Findings again are concerning for cystitis.\" per radiology     He was transitioned to oral omnicef/topical antifungal on approx 8/23/19; Noncompliant with f/u in our office     READMITTED 10/8/19 RLQ abd pain, urinary retention, nausea, dysuria and generalized fatigue     UTI by U/A, subsequent blood cultures positive for  kleb sp, urine with kleb and e coli ;  Abnormal CT abd with right perinephric fluid collection, advanced cirrhosis, urinary bladder thickening.  10/9 Aspirate of perinephric fluid collection consistent with abscess/kleb and white blood cells; 10/16/19 cytoscopy with bullous change per Dr Gutierres but no fistula per him; 10/19/19 intermittent nausea, no vomiting or dry heaves this am, intermittent dry cough broadened to zosyn with ?aspiration pneumonia evolving after dry heaves/vomiting and had intermittent diarrhea, oral vancomycin added empirically with history of prior C. Difficile; improved with IV Zosyn/oral vancomycin and he refused consideration of placement.  He insisted on home, discharged October 23 and noncompliant with outpatient therapy and outpatient follow-up October 25.  He had become fatigued such that his family could not assist him out of bed and it was recommended by my office that he return to the emergency room October 25.  He apparently refused that but did come to the " emergency room October 26.     Readmitted October 26, 2019 with generalized weakness, fatigue/malaise and nausea/vomiting/diarrhea.  CT scan revealed increased size of perinephric fluid collection per radiology.     On October 28, patient had reported increased diarrhea, at least 6 episodes overnight and watery/thin but no hematochezia melena or hematemesis.  Vomiting has subsided     10/29 drain placed    11/5/19 drain inadvertently out overnight per him    11/6/19 no worse per him;  GI habits variable, no vomiting today; stool consistency varies and not always diarrhea per family; vague abdominal discomfort that he describes as dull, intermittent, nonradiating, worse with palpation, generally better with pain meds and 3 out of 10.  No dysuria hematuria or pyuria.  Denies new hesitancy urgency or flank pain.      No headache photophobia or neck stiffness.  No shortness of breath cough or hemoptysis.  No fevers chills or sweats.  No rash.  No other particular exposures     ROS:      11/6/19 No f/c/s. No vomiting. No rash. No new ADR to Abx.     Constitutional-- No Fever, chills or sweats.  Appetite diminished with generalized malaise and fatigue  Heent-- No new vision, hearing or throat complaints.  No epistaxis or oral sores.  Denies odynophagia or dysphagia.  No flashers, floaters or eye pain. No odynophagia or dysphagia. No headache, photophobia or neck stiffness.  CV-- No chest pain, palpitation or syncope  Resp-- No SOB/cough/Hemoptysis  GI-as above.  No hematochezia, melena, or hematemesis. Denies jaundice or chronic liver disease.  -- No dysuria, hematuria, or flank pain.  Denies hesitancy or flank pain.  Lymph- no swollen lymph nodes in neck/axilla or groin.   Heme- No active bruising or bleeding; no Hx of DVT or PE.  MS-- no swelling or pain in the bones or joints of arms/legs.  No new back pain.  Neuro-- No acute focal weakness or numbness in the arms or legs.  No seizures.     Full 12 point review of  "systems reviewed and negative otherwise for acute complaints, except for above      PE:   /93 (BP Location: Left arm, Patient Position: Lying)   Pulse 75   Temp 97.5 °F (36.4 °C) (Oral)   Resp 18   Ht 190.5 cm (75\")   Wt 121 kg (267 lb)   SpO2 98%   BMI 33.37 kg/m²     GENERAL: Awake and alert, in no acute distress.   HEENT: Normocephalic, atraumatic.  PERRL. EOMI. No conjunctival injection. No icterus. Oropharynx clear without evidence of thrush or exudate. No evidence of peridontal disease.    NECK: Supple without nuchal rigidity. No mass.  LYMPH: No cervical, axillary or inguinal lymphadenopathy.  HEART: RRR; No murmur, rubs, gallops.   LUNGS: Diminished at bases to auscultation bilaterally with slight rhonchi bilateral but no wheezing or rales. Normal respiratory effort. Nonlabored. No dullness.  ABDOMEN: Soft, nontender, nondistended. Positive bowel sounds. No rebound or guarding. NO mass or HSM.  EXT:  No cyanosis, clubbing or edema. No cord.  : vague erythema/sattelite lesions c/w cutaneous candidiasis  MSK: FROM without joint effusions noted arms/legs.    SKIN: Warm and dry without cutaneous eruptions on Inspection/palpation.    NEURO: Oriented to PPT. No focal deficits on motor/sensory exam at arms/legs.     No peripheral stigmata/phenomena of endocarditis     PICC line without obvious redness or drainage     Laboratory Data    Results from last 7 days   Lab Units 11/06/19  0813 11/05/19  0457 11/03/19  0804   WBC 10*3/mm3 11.43* 12.11* 11.83*   HEMOGLOBIN g/dL 9.0* 9.5* 9.2*   HEMATOCRIT % 29.5* 30.9* 29.5*   PLATELETS 10*3/mm3 373 402 359     Results from last 7 days   Lab Units 11/05/19  0457   SODIUM mmol/L 140   POTASSIUM mmol/L 3.9   CHLORIDE mmol/L 101   CO2 mmol/L 25.0   BUN mg/dL 12   CREATININE mg/dL 0.50*   GLUCOSE mg/dL 166*   CALCIUM mg/dL 8.7     Results from last 7 days   Lab Units 11/03/19  0804   ALK PHOS U/L 232*   BILIRUBIN mg/dL <0.2*   ALT (SGPT) U/L 17   AST (SGOT) U/L " 20               Estimated Creatinine Clearance: 245 mL/min (A) (by C-G formula based on SCr of 0.5 mg/dL (L)).      Microbiology:      Radiology:  Imaging Results (last 72 hours)     Procedure Component Value Units Date/Time    CT Abdomen Pelvis With Contrast [[id number]] Collected:  10/26/19 2035     Updated:  10/26/19 2037    Narrative:       CT ABDOMEN AND PELVIS WITH CONTRAST, 10/26/2019    HISTORY:  49-year-old male with recent history of a right perinephric fluid collection that underwent diagnostic needle aspiration 10/9/2019. He has been on IV antibiotic therapy for possible perinephric abscess. Examination is requested today for follow-up.    TECHNIQUE:  CT imaging of the abdomen and pelvis with IV contrast. Radiation dose reduction techniques included automated exposure control. Radiation audit for CT and nuclear cardiology exams in the last 12 months: 0.    ABDOMEN FINDINGS:  Rim-enhancing pericapsular or subcapsular fluid collection along the posterior margin of the lower pole right kidney has enlarged since 10/18/2019. It previously measured about 5.7 x 3.9 cm axially and currently measures 6.0 x 4.8 cm in the same location  (see axial image 48). There is associated perinephric soft tissue stranding, most likely inflammatory. The findings are compatible with clinically suspected perinephric abscess.    Both kidneys enhance normally. There is no evidence of upper urinary tract obstruction.    Liver, pancreas and spleen are within normal limits. No bile duct or pancreatic duct dilatation.    Small bowel and colon are normal in caliber and appearance without GI contrast. Normal appendix. No gastric distention, hiatal hernia or distal esophageal dilatation.    PELVIS FINDINGS:  Urinary bladder, prostate and rectum are within normal limits.    Lung base images show a tiny right pleural effusion.      Impression:       1.  Likely perinephric or subcapsular abscess along the posterior aspect of the lower pole  right kidney. This has enlarged since 10/18/2019.  2.  Normal renal parenchymal enhancement. No CT evidence of pyelonephritis at this time. No evidence of upper urinary tract obstruction.  3.  Tiny right pleural effusion.    Signer Name: Fadi Healy MD   Signed: 10/26/2019 8:35 PM   Workstation Name: [email or web address]    [facility]            Impression:      --Acute Kleb septicemia/sepsis on treatment since last admit, likely urinary source/pyelnoephritis associated with urinary retention and  with abnormal CT scan with perinephric collection/abscess and Kleb on aspirate that has persisted as of aspirate 10/29 arguing relatively sequestered focus not penetrated by systemic abx and now with drain placed 10/29;  IV  Zosyn ongoing; urology to determine any timing/option or threshold for further intervention such as surgical debridement if not responsive to drain/abx, or other functional/anatomic assessment.  DRAIN INADVERTENTLY OUT 11/4;  Monitor clinically, likely to need repeat CT to evaluate in upcoming days;  D/w Dr Forbes     --Acute perinephric abscess as above;   Urology following to help guide timing/option/threshold for further drainage (perc -v- other);  Perc drain 10/29 and kleb persists in culture    --urinary frequency with cutaneous candidiasis and candiduria/pyruria;  2 brief courses diflucan     --abnormal imaging with dry cough and probable pneumonia last admission;  At risk for aspiration with recent vomiting/dry heaves and empiric zosyn started 10/19; changed to Invanz 10/23 to help facilitate home therapy with mom and changed back to Zosyn October 28;   no productive cough at present, but if it develops, then send for culture     --acute  diarrhea 10/27-10/28 with Hx CDiff per him/family and recent empiric oral vancomycin, CDT negative and oral vancomycin stopped;  If recurrent diarrhea then consider more wrolup or empiric therapy     --Hx perirectal  abscess ; Colorectal surgery, Dr. Payne previously saw and is following.  Drainage as above on August 2 with mxed Fecal sherly in culture; CT scans  with no mention of abscess on 8/18 study;   any timing/option or threshold for further GI/colorectal work-up per Dr payne/CRS     --History urinary retention.  urology following     --Hx ARF in August 2019;  ?obstruction initially associated with retention postop (1200 out with I/O cath postop per nursing) -v- contrast from CT -v- lisinopril/medication/vancomycin -v- relative hypotension -v- likely combination of factors with ATN;  vancomycin trough high and vancomycin stopped empirically 8/3 although high trough potentially accumulation as a consequence of diminishing renal function associated with retention/contrast/pressure rather than cause.  Nonetheless, avoid for now; likely further acute kidney injury at admission August 17 associated with dehydration/prerenal/ATN etiology     --History MRSA;  Not in current culture     --Diabetes mellitus type 2, uncontrolled.  He reports the reason for this is forgetfulness in past     --History Johnston and chronic liver disease/cirrhosis  per past notes     --Left BKA     --Peripheral vascular disease     --medical noncompliance and inability to care for self at home.       PLAN:      --IV Zosyn; IV diflucan      --Check/review labs cultures and scans     --Discussed with microbiology     --History per nursing staff      --Discussed with family, partial history per them     --Highly complex set of issues with high risk for further serious morbidity and other serious sequela     --Colorectal surgery and urology have followed; urology following to determine timing/option or threshold for further percutaneous aspiration/drainage versus other surgery regarding  abscess     --D/w Dr Andrei Dong MD  11/6/2019

## 2019-11-07 ENCOUNTER — APPOINTMENT (OUTPATIENT)
Dept: ULTRASOUND IMAGING | Facility: HOSPITAL | Age: 49
End: 2019-11-07

## 2019-11-07 LAB
ALBUMIN SERPL-MCNC: 2.8 G/DL (ref 3.5–5.2)
ALBUMIN/GLOB SERPL: 0.8 G/DL
ALP SERPL-CCNC: 224 U/L (ref 39–117)
ALT SERPL W P-5'-P-CCNC: 21 U/L (ref 1–41)
ANION GAP SERPL CALCULATED.3IONS-SCNC: 13 MMOL/L (ref 5–15)
AST SERPL-CCNC: 24 U/L (ref 1–40)
BILIRUB SERPL-MCNC: <0.2 MG/DL (ref 0.2–1.2)
BUN BLD-MCNC: 10 MG/DL (ref 6–20)
BUN/CREAT SERPL: 16.1 (ref 7–25)
CALCIUM SPEC-SCNC: 8.5 MG/DL (ref 8.6–10.5)
CHLORIDE SERPL-SCNC: 98 MMOL/L (ref 98–107)
CO2 SERPL-SCNC: 25 MMOL/L (ref 22–29)
CREAT BLD-MCNC: 0.62 MG/DL (ref 0.76–1.27)
DEPRECATED RDW RBC AUTO: 48.7 FL (ref 37–54)
ERYTHROCYTE [DISTWIDTH] IN BLOOD BY AUTOMATED COUNT: 15.2 % (ref 12.3–15.4)
GFR SERPL CREATININE-BSD FRML MDRD: 137 ML/MIN/1.73
GLOBULIN UR ELPH-MCNC: 3.3 GM/DL
GLUCOSE BLD-MCNC: 186 MG/DL (ref 65–99)
GLUCOSE BLDC GLUCOMTR-MCNC: 111 MG/DL (ref 70–130)
GLUCOSE BLDC GLUCOMTR-MCNC: 142 MG/DL (ref 70–130)
GLUCOSE BLDC GLUCOMTR-MCNC: 168 MG/DL (ref 70–130)
GLUCOSE BLDC GLUCOMTR-MCNC: 192 MG/DL (ref 70–130)
HCT VFR BLD AUTO: 31.3 % (ref 37.5–51)
HGB BLD-MCNC: 9.6 G/DL (ref 13–17.7)
MCH RBC QN AUTO: 27 PG (ref 26.6–33)
MCHC RBC AUTO-ENTMCNC: 30.7 G/DL (ref 31.5–35.7)
MCV RBC AUTO: 87.9 FL (ref 79–97)
PHOSPHATE SERPL-MCNC: 3.6 MG/DL (ref 2.5–4.5)
PLATELET # BLD AUTO: 386 10*3/MM3 (ref 140–450)
PMV BLD AUTO: 10.6 FL (ref 6–12)
POTASSIUM BLD-SCNC: 3.8 MMOL/L (ref 3.5–5.2)
PROT SERPL-MCNC: 6.1 G/DL (ref 6–8.5)
RBC # BLD AUTO: 3.56 10*6/MM3 (ref 4.14–5.8)
SODIUM BLD-SCNC: 136 MMOL/L (ref 136–145)
WBC NRBC COR # BLD: 11.73 10*3/MM3 (ref 3.4–10.8)

## 2019-11-07 PROCEDURE — 82962 GLUCOSE BLOOD TEST: CPT

## 2019-11-07 PROCEDURE — 99232 SBSQ HOSP IP/OBS MODERATE 35: CPT | Performed by: FAMILY MEDICINE

## 2019-11-07 PROCEDURE — 25010000002 PIPERACILLIN SOD-TAZOBACTAM PER 1 G: Performed by: INTERNAL MEDICINE

## 2019-11-07 PROCEDURE — 80053 COMPREHEN METABOLIC PANEL: CPT | Performed by: INTERNAL MEDICINE

## 2019-11-07 PROCEDURE — 97530 THERAPEUTIC ACTIVITIES: CPT

## 2019-11-07 PROCEDURE — 63710000001 INSULIN LISPRO (HUMAN) PER 5 UNITS: Performed by: NURSE PRACTITIONER

## 2019-11-07 PROCEDURE — 85027 COMPLETE CBC AUTOMATED: CPT | Performed by: INTERNAL MEDICINE

## 2019-11-07 PROCEDURE — 25010000002 FLUCONAZOLE PER 200 MG: Performed by: INTERNAL MEDICINE

## 2019-11-07 PROCEDURE — 97110 THERAPEUTIC EXERCISES: CPT

## 2019-11-07 PROCEDURE — 76700 US EXAM ABDOM COMPLETE: CPT

## 2019-11-07 PROCEDURE — 84100 ASSAY OF PHOSPHORUS: CPT

## 2019-11-07 PROCEDURE — 63710000001 INSULIN DETEMIR PER 5 UNITS: Performed by: INTERNAL MEDICINE

## 2019-11-07 PROCEDURE — 25010000002 HEPARIN (PORCINE) PER 1000 UNITS: Performed by: NURSE PRACTITIONER

## 2019-11-07 RX ORDER — OXYCODONE AND ACETAMINOPHEN 10; 325 MG/1; MG/1
1 TABLET ORAL 2 TIMES DAILY PRN
COMMUNITY
End: 2019-11-15 | Stop reason: HOSPADM

## 2019-11-07 RX ORDER — HYDRALAZINE HYDROCHLORIDE 25 MG/1
25 TABLET, FILM COATED ORAL EVERY 8 HOURS SCHEDULED
Status: DISCONTINUED | OUTPATIENT
Start: 2019-11-07 | End: 2019-11-15 | Stop reason: HOSPADM

## 2019-11-07 RX ADMIN — INSULIN LISPRO 2 UNITS: 100 INJECTION, SOLUTION INTRAVENOUS; SUBCUTANEOUS at 22:33

## 2019-11-07 RX ADMIN — INSULIN LISPRO 2 UNITS: 100 INJECTION, SOLUTION INTRAVENOUS; SUBCUTANEOUS at 08:50

## 2019-11-07 RX ADMIN — Medication 250 MG: at 08:47

## 2019-11-07 RX ADMIN — SEVELAMER CARBONATE 0.8 G: 0.8 POWDER, FOR SUSPENSION ORAL at 08:47

## 2019-11-07 RX ADMIN — Medication 250 MG: at 22:32

## 2019-11-07 RX ADMIN — DULOXETINE HYDROCHLORIDE 60 MG: 60 CAPSULE, DELAYED RELEASE ORAL at 08:47

## 2019-11-07 RX ADMIN — FLUCONAZOLE 200 MG: 200 INJECTION, SOLUTION INTRAVENOUS at 08:45

## 2019-11-07 RX ADMIN — INSULIN DETEMIR 22 UNITS: 100 INJECTION, SOLUTION SUBCUTANEOUS at 08:46

## 2019-11-07 RX ADMIN — HEPARIN SODIUM 5000 UNITS: 5000 INJECTION INTRAVENOUS; SUBCUTANEOUS at 13:09

## 2019-11-07 RX ADMIN — METOPROLOL TARTRATE 100 MG: 100 TABLET ORAL at 22:33

## 2019-11-07 RX ADMIN — METOPROLOL TARTRATE 100 MG: 100 TABLET ORAL at 08:47

## 2019-11-07 RX ADMIN — TAZOBACTAM SODIUM AND PIPERACILLIN SODIUM 3.38 G: 375; 3 INJECTION, SOLUTION INTRAVENOUS at 01:34

## 2019-11-07 RX ADMIN — TAZOBACTAM SODIUM AND PIPERACILLIN SODIUM 3.38 G: 375; 3 INJECTION, SOLUTION INTRAVENOUS at 08:47

## 2019-11-07 RX ADMIN — INSULIN DETEMIR 22 UNITS: 100 INJECTION, SOLUTION SUBCUTANEOUS at 21:00

## 2019-11-07 RX ADMIN — INSULIN LISPRO 5 UNITS: 100 INJECTION, SOLUTION INTRAVENOUS; SUBCUTANEOUS at 08:48

## 2019-11-07 RX ADMIN — TERAZOSIN HYDROCHLORIDE ANHYDROUS 5 MG: 5 CAPSULE ORAL at 22:32

## 2019-11-07 RX ADMIN — SODIUM CHLORIDE, PRESERVATIVE FREE 10 ML: 5 INJECTION INTRAVENOUS at 08:48

## 2019-11-07 RX ADMIN — ASPIRIN 81 MG: 81 TABLET, COATED ORAL at 08:47

## 2019-11-07 RX ADMIN — HYDRALAZINE HYDROCHLORIDE 25 MG: 25 TABLET, FILM COATED ORAL at 22:32

## 2019-11-07 RX ADMIN — SODIUM CHLORIDE, PRESERVATIVE FREE 10 ML: 5 INJECTION INTRAVENOUS at 22:33

## 2019-11-07 RX ADMIN — NYSTATIN: 100000 CREAM TOPICAL at 08:50

## 2019-11-07 RX ADMIN — AMLODIPINE BESYLATE 10 MG: 10 TABLET ORAL at 08:47

## 2019-11-07 RX ADMIN — NYSTATIN: 100000 CREAM TOPICAL at 22:33

## 2019-11-07 RX ADMIN — HEPARIN SODIUM 5000 UNITS: 5000 INJECTION INTRAVENOUS; SUBCUTANEOUS at 22:33

## 2019-11-07 RX ADMIN — HYDRALAZINE HYDROCHLORIDE 25 MG: 25 TABLET, FILM COATED ORAL at 13:09

## 2019-11-07 RX ADMIN — TAZOBACTAM SODIUM AND PIPERACILLIN SODIUM 3.38 G: 375; 3 INJECTION, SOLUTION INTRAVENOUS at 17:11

## 2019-11-07 NOTE — PROGRESS NOTES
"Daily Progress Note    Patient: Kendrick MADRID [place] Day: 12    Subjective: Pt with new abdominal distension.  He also states pneumaturia has recurred.  Previous cystoscopy without definite fistula but did have some bullous edema at base of bladder c/w his childs catheter placement at the time.      Objective:     /95 (BP Location: Left arm, Patient Position: Lying)   Pulse 78   Temp 97.9 °F (36.6 °C) (Oral)   Resp 18   Ht 190.5 cm (75\")   Wt 121 kg (267 lb)   SpO2 98%   BMI 33.37 kg/m²       Intake/Output Summary (Last 24 hours) at 11/7/2019 1559  Last data filed at 11/7/2019 1000  Gross per 24 hour   Intake 240 ml   Output --   Net 240 ml       Review of Systems:    Physical Exam:   General Appearance: alert, appears stated age and cooperative  Head: normocephalic, without obvious abnormality and atraumatic  Lungs respirations regular  Abdomen distended    Extremities moves extremities well, no edema, no cyanosis and no redness      Lab Results (last 24 hours)     Procedure Component Value Units Date/Time    POC Glucose Once [[id number]]  (Abnormal) Collected:  11/07/19 1127    Specimen:  Blood Updated:  11/07/19 1132     Glucose 142 mg/dL     Comprehensive Metabolic Panel [[id number]]  (Abnormal) Collected:  11/07/19 0634    Specimen:  Blood Updated:  11/07/19 0824     Glucose 186 mg/dL      BUN 10 mg/dL      Creatinine 0.62 mg/dL      Sodium 136 mmol/L      Potassium 3.8 mmol/L      Chloride 98 mmol/L      CO2 25.0 mmol/L      Calcium 8.5 mg/dL      Total Protein 6.1 g/dL      Albumin 2.80 g/dL      ALT (SGPT) 21 U/L      AST (SGOT) 24 U/L      Alkaline Phosphatase 224 U/L      Total Bilirubin <0.2 mg/dL      eGFR Non African Amer 137 mL/min/1.73      Globulin 3.3 gm/dL      A/G Ratio 0.8 g/dL      BUN/Creatinine Ratio 16.1     Anion Gap 13.0 mmol/L     Narrative:       GFR Normal >60  Chronic Kidney Disease <60  Kidney Failure <15    Phosphorus [[id number]]  (Normal) Collected:  11/07/19 0634    " Specimen:  Blood Updated:  11/07/19 0824     Phosphorus 3.6 mg/dL     POC Glucose Once [[id number]]  (Abnormal) Collected:  11/07/19 0735    Specimen:  Blood Updated:  11/07/19 0754     Glucose 192 mg/dL     CBC (No Diff) [[id number]]  (Abnormal) Collected:  11/07/19 0621    Specimen:  Blood Updated:  11/07/19 0725     WBC 11.73 10*3/mm3      RBC 3.56 10*6/mm3      Hemoglobin 9.6 g/dL      Hematocrit 31.3 %      MCV 87.9 fL      MCH 27.0 pg      MCHC 30.7 g/dL      RDW 15.2 %      RDW-SD 48.7 fl      MPV 10.6 fL      Platelets 386 10*3/mm3     POC Glucose Once [[id number]]  (Normal) Collected:  11/06/19 1640    Specimen:  Blood Updated:  11/06/19 1648     Glucose 98 mg/dL           Imaging Results (Last 24 Hours)     ** No results found for the last 24 hours. **          Assessment/Plan: 1. Renal abscess s/p percutaneous drain that has been inadvertently removed.  He denies flank pain.       2.  Pneumaturia, recurrent. Will assess with [facility]        Patient Active Problem List   Diagnosis   • DUNLAP Cirrhosis   • Essential hypertension   • Non-compliance   • Hyperlipemia   • Hypertriglyceridemia, essential   • Sepsis affecting skin   • Type 2 diabetes mellitus with circulatory disorder and uncontrolled   • History of MRSA infection   • Diabetic ulcer of right lower leg (CMS/HCC)   • S/P BKA (below knee amputation), left (CMS/HCC)   • Peripheral vascular disease (CMS/[facility])   • DM (diabetes mellitus) type II uncontrolled, periph vascular disorder (CMS/[facility])   • Obesity (BMI 30-39.9)   • Gastroparesis   • Perinephric abscess   • Acute UTI (urinary tract infection)   • Bacteremia due to Klebsiella pneumoniae        Diagnosis Plan   1. Perinephric abscess     2. History of sepsis     3. History of bacteremia     4. DM (diabetes mellitus) type II uncontrolled, periph vascular disorder (CMS/HCC)     5. Essential hypertension     6. S/P BKA (below knee amputation), left (CMS/HCC)     7. Impaired mobility and ADLs           Brad  YOAN Gutierres MD - 11/7/2019, 3:59 PM

## 2019-11-07 NOTE — PLAN OF CARE
Problem: Patient Care Overview  Goal: Plan of Care Review  Outcome: Ongoing (interventions implemented as appropriate)   11/07/19 4441   OTHER   Outcome Summary PT UP TODAY PARTICIPATING WITH O.T. DID AGREE TO WORK WITH P.T. ASKING FOR HOME DOSE OF PERCOCET. CALLED MD FOR PAIN MED REQUEST AND NOTHING ORDERED. PT DOESN'T WANT TYLENOL. NPO UNTIL AFTER US OF ABDOMEN TONIGHT. WIFE AT BEDSIDE.       Problem: Skin Injury Risk (Adult)  Goal: Skin Health and Integrity  Outcome: Ongoing (interventions implemented as appropriate)      Problem: Diabetes, Type 2 (Adult)  Goal: Signs and Symptoms of Listed Potential Problems Will be Absent, Minimized or Managed (Diabetes, Type 2)  Outcome: Ongoing (interventions implemented as appropriate)

## 2019-11-07 NOTE — PLAN OF CARE
Problem: Patient Care Overview  Goal: Plan of Care Review  Outcome: Ongoing (interventions implemented as appropriate)   11/07/19 1008   Coping/Psychosocial   Plan of Care Reviewed With patient;spouse   Plan of Care Review   Progress improving   OTHER   Outcome Summary Pt complete donning shorts with set up. Participated EOB in UE therapeutic ex. Recommend IRF at discharge.

## 2019-11-07 NOTE — THERAPY TREATMENT NOTE
Acute Care - Occupational Therapy Treatment Note  [facility]     Patient Name: Kendrick Crystal  : 1970  MRN: [id number]  Today's Date: 2019     Date of Referral to OT: 10/27/19  Referring Physician: MD Elliot    Admit Date: 10/26/2019       ICD-10-CM ICD-9-CM   1. Perinephric abscess N15.1 590.2   2. History of sepsis Z86.19 V12.09   3. History of bacteremia Z87.898 V12.09   4. DM (diabetes mellitus) type II uncontrolled, periph vascular disorder (CMS/[facility]) E11.51 250.72    E11.65 443.81   5. Essential hypertension I10 401.9   6. S/P BKA (below knee amputation), left (CMS/HCC) Z89.512 V49.75   7. Impaired mobility and ADLs Z74.09 799.89     Patient Active Problem List   Diagnosis   • DUNLAP Cirrhosis   • Essential hypertension   • Non-compliance   • Hyperlipemia   • Hypertriglyceridemia, essential   • Sepsis affecting skin   • Type 2 diabetes mellitus with circulatory disorder and uncontrolled   • History of MRSA infection   • Diabetic ulcer of right lower leg (CMS/[facility])   • S/P BKA (below knee amputation), left (CMS/[facility])   • Peripheral vascular disease (CMS/[facility])   • DM (diabetes mellitus) type II uncontrolled, periph vascular disorder (CMS/[facility])   • Obesity (BMI 30-39.9)   • Gastroparesis   • Perinephric abscess   • Acute UTI (urinary tract infection)   • Bacteremia due to Klebsiella pneumoniae     Past Medical History:   Diagnosis Date   • Abdominal wall cellulitis 2019   • JUAN (acute kidney injury) (CMS/[facility]) 2018   • Arthritis    • Bipolar 1 disorder (CMS/[facility])    • Cellulitis of right anterior lower leg 2018    WITH MRSA   • Cirrhosis (CMS/[facility])    • Counseling for insulin pump    • Depression    • Diabetes mellitus (CMS/[facility])    • Encephalopathy, hepatic (CMS/[facility])    • H/O degenerative disc disease    • History of Lissa's gangrene     right leg/testicle   • Hyperlipemia    • Hypertension    • multiple Skin abscesses    • DUNLAP, bx showed stage IV fibrosis    • Neuropathy    • Peripheral  vascular disease (CMS/HCC)    • Thrombophlebitis      Past Surgical History:   Procedure Laterality Date   • ABDOMINAL WALL ABSCESS INCISION AND DRAINAGE N/A 4/26/2019    Procedure: ABDOMINAL WALL DEBRIDEMENT;  Surgeon: Fadi Kern MD;  Location:  [facility];  Service: General   • AMPUTATION DIGIT Left 1/30/2017    Procedure: left fourth and fifth transmetatarsal toe amputation ;  Surgeon: Juancho Martinez MD;  Location:  [facility];  Service:    • BELOW KNEE AMPUTATION Left 3/2/2017    Procedure: AMPUTATION BELOW KNEE, SHMUEL;  Surgeon: Juancho Martinez MD;  Location:  [facility];  Service:    • CYSTOSCOPY N/A 10/16/2019    Procedure: CYSTOSCOPY;  Surgeon: Brad Gutierres MD;  Location:  [facility];  Service: Urology   • ENDOSCOPY     • HEMORRHOIDECTOMY N/A 8/2/2019    Procedure: EXCISION AND DRAIN PERIRECTAL ABSCESS;  Surgeon: Mumtaz Payne MD;  Location:  [facility];  Service: General   • LEG SURGERY     • TESTICLE SURGERY Right     DEBRIDEMENT FROM GANGRENE   • TONSILLECTOMY  1975       Therapy Treatment    Rehabilitation Treatment Summary     Row Name 11/07/19 1008             Treatment Time/Intention    Discipline  occupational therapist  -AN      Document Type  therapy note (daily note)  -AN      Subjective Information  no complaints  -AN      Mode of Treatment  occupational therapy  -AN      Patient/Family Observations  pt supine in bed, spouse present  -AN      Care Plan Review  care plan/treatment goals reviewed;risks/benefits reviewed  -AN      Patient Effort  good  -AN      Existing Precautions/Restrictions  fall  -AN      Treatment Considerations/Comments  L BKA, pt has L prosthesis, but currently too big to utilize for mobilty.   -AN2      Recorded by [AN] Fela Anguiano OT 11/07/19 1045  [AN2] Fela Anguiano OT 11/07/19 1050      Row Name 11/07/19 1008             Cognitive Assessment/Intervention- PT/OT    Affect/Mental Status (Cognitive)  WFL  -AN      Orientation Status (Cognition)  oriented x 3   -AN      Follows Commands (Cognition)  WFL  -AN      Recorded by [AN] Fela Anguiano, OT 11/07/19 1050      Row Name 11/07/19 1008             Bed Mobility Assessment/Treatment    Supine-Sit [place] (Bed Mobility)  supervision  -AN      Sit-Supine [place] (Bed Mobility)  supervision  -AN      Recorded by [AN] Fela Anguiano, OT 11/07/19 1050      Row Name 11/07/19 1008             Lower Body Dressing Assessment/Training    Lower Body Dressing [place] Level  don;pants/bottoms;set up;supervision  -AN      Lower Body Dressing Position  long sitting  -AN      Recorded by [AN] Fela Anguiano, OT 11/07/19 1050      Row Name 11/07/19 1008             Therapeutic Exercise    97048 - OT Therapeutic Exercise Minutes  18  -AN      69795 - OT Therapeutic Activity Minutes  8  -AN      Recorded by [AN] Fela Anguiano, OT 11/07/19 1050      Row Name 11/07/19 1008             Upper Extremity Seated Therapeutic Exercise    Device, Seated Upper Extremity (Therapeutic Exercise)  other (see comments) use of 1 lb container for deltoid ex  -AN      Sets/Reps Detail, Seated Upper Extremity (Therapeutic Exercise)  2/8-10  -AN      Comment, Seated Upper Extremity (Therapeutic Exercise)  Seated tricep pushes EOB x 6  -AN      Recorded by [AN] Fela Anguiano, OT 11/07/19 1050      Row Name 11/07/19 1008             Therapeutic Exercise    Upper Extremity Range of Motion (Therapeutic Exercise)  shoulder flexion/extension, bilateral;shoulder horizontal abduction/adduction, bilateral;shoulder internal/external rotation, bilateral;forearm supination/pronation, bilateral  -AN      Comment (Therapeutic Exercise)  focus on scapular, deltoid , tricep and bicep ex graded appropriately   -AN      Recorded by [AN] Fela Anguiano, OT 11/07/19 1050      Row Name 11/07/19 1008             Static Sitting Balance    Level of [place] (Unsupported Sitting, Static Balance)  standby assist  -AN      Sitting Position (Unsupported Sitting,  Static Balance)  sitting on edge of bed  -AN      Time Able to Maintain Position (Unsupported Sitting, Static Balance)  more than 5 minutes  -AN      Recorded by [AN] Fela Anguiano, OT 11/07/19 1050      Row Name 11/07/19 1008             Dynamic Sitting Balance    Level of [place], Reaches Outside Midline (Sitting, Dynamic Balance)  contact guard assist  -AN      Sitting Position, Reaches Outside Midline (Sitting, Dynamic Balance)  sitting on edge of bed  -AN      Comment, Reaches Outside Midline (Sitting, Dynamic Balance)  A/P weight shift  -AN      Recorded by [AN] Fela Anguiano, OT 11/07/19 1050      Row Name 11/07/19 1008             Positioning and Restraints    Pre-Treatment Position  in bed  -AN      Post Treatment Position  bed  -AN      In Bed  supine;encouraged to call for assist;call light within reach;with family/caregiver  -AN      Recorded by [AN] Fela Anguiano, OT 11/07/19 1050      Row Name 11/07/19 1008             Pain Scale: Numbers Pre/Post-Treatment    Pain Scale: Numbers, Pretreatment  0/10 - no pain  -AN      Pain Scale: Numbers, Post-Treatment  0/10 - no pain  -AN      Recorded by [AN] Fela Anguiano, OT 11/07/19 1050      Row Name                Wound 10/27/19 0945 Right lateral ankle Abrasion    Wound - Properties Group Date first assessed: 10/27/19 [CP] Time first assessed: 0945 [CP] Present on Hospital Admission: Y [CP] Side: Right [CP] Orientation: lateral [CP] Location: ankle [CP] Primary Wound Type: Abrasion [CP] Recorded by:  [CP] Alivia Cespedes APRN 10/27/19 1016    Row Name                Wound 11/04/19 1915 Right lateral flank Incision    Wound - Properties Group Date first assessed: 11/04/19 [LN] Time first assessed: 1915 [LN] Side: Right [LN] Orientation: lateral [LN] Location: flank [LN] Primary Wound Type: Incision [LN], from GALO drain  Recorded by:  [LN] Louann Colunga RN 11/05/19 1347    Row Name 11/07/19 1008             Plan of Care Review    Plan of Care  Reviewed With  patient;spouse  -AN      Recorded by [AN] Fela Anguiano, OT 11/07/19 1050      Row Name 11/07/19 1008             Outcome Summary/Treatment Plan (OT)    Daily Summary of Progress (OT)  progress toward functional goals is good  -AN      Anticipated Discharge Disposition (OT)  inpatient rehabilitation facility  -AN      Recorded by [AN] Fela Anguiano, OT 11/07/19 1050        User Key  (r) = Recorded By, (t) = Taken By, (c) = Cosigned By    Initials Name Effective Dates Discipline    AN Fela Anguiano, OT 06/22/15 -  OT    CP Alivia Cespedes, CHINTAN 07/24/19 -  Nurse    LN Louann Colunga RN 10/16/17 -  Nurse        Wound 10/27/19 0945 Right lateral ankle Abrasion (Active)   Dressing Appearance dry;intact 11/7/2019 10:00 AM   Closure KALYN 11/7/2019 10:00 AM   Base pink;dry 11/6/2019  8:00 PM   Drainage Amount none 11/6/2019  8:00 PM   Dressing Care, Wound dressing applied 11/6/2019  8:00 PM       Wound 11/04/19 1915 Right lateral flank Incision (Active)   Dressing Appearance dry;no drainage;intact 11/7/2019 10:00 AM   Closure KALYN 11/7/2019 10:00 AM   Base dressing in place, unable to visualize 11/6/2019  4:00 PM   Periwound intact;dry 11/6/2019  8:00 PM   Periwound Temperature warm 11/6/2019  4:00 PM   Periwound Skin Turgor soft 11/6/2019  4:00 PM   Drainage Amount none 11/6/2019  8:00 PM   Dressing Care, Wound dressing applied 11/6/2019  8:00 PM   Periwound Care, Wound dry periwound area maintained 11/6/2019  4:00 PM       Occupational Therapy Education     Title: PT OT SLP Therapies (In Progress)     Topic: Occupational Therapy (Done)     Point: ADL training (Done)     Description: Instruct learner(s) on proper safety adaptation and remediation techniques during self care or transfers.   Instruct in proper use of assistive devices.    Learning Progress Summary           Patient Acceptance, E,D, VU,NR by AN at 11/7/2019 10:08 AM    Comment:  Safety with ADLS; UE ex's for upper UE.    Acceptance,  E,D,TB, VU,DU,NR by VIVIAN at 11/5/2019  3:35 PM    Comment:  Education reinforced for benefits of OOB/dynamic activity/therapy, role of OT and HEP to support ADLs.    Nonacceptance, E, NR by AN at 10/31/2019  3:44 PM    Comment:  Educated on importance, benefits vs consequences of getting OOB and increasing therapeutic ex/activities.    Acceptance, E,D, VU,DU by CYNDI at 10/29/2019  2:30 PM    Comment:  Pt educated on HEP.   Significant Other Acceptance, E,D, VU,NR by AN at 11/7/2019 10:08 AM    Comment:  Safety with ADLS; UE ex's for upper UE.    Acceptance, E,D,TB, VU,DU,NR by VIVIAN at 11/5/2019  3:35 PM    Comment:  Education reinforced for benefits of OOB/dynamic activity/therapy, role of OT and HEP to support ADLs.                   Point: Home exercise program (Done)     Description: Instruct learner(s) on appropriate technique for monitoring, assisting and/or progressing therapeutic exercises/activities.    Learning Progress Summary           Patient Acceptance, E,D, VU,NR by AN at 11/7/2019 10:08 AM    Comment:  Safety with ADLS; UE ex's for upper UE.    Acceptance, E,D,TB, VU,DU,NR by VIVIAN at 11/5/2019  3:35 PM    Comment:  Education reinforced for benefits of OOB/dynamic activity/therapy, role of OT and HEP to support ADLs.    Acceptance, E,D, VU,DU by CYNDI at 11/1/2019  4:01 PM    Comment:  Pt educated on role of OT, safety, fall prevention, ADLsm and HEP.    Nonacceptance, E, NR by AN at 10/31/2019  3:44 PM    Comment:  Educated on importance, benefits vs consequences of getting OOB and increasing therapeutic ex/activities.    Acceptance, E,D, VU,DU by CYNDI at 10/29/2019  2:30 PM    Comment:  Pt educated on HEP.   Significant Other Acceptance, E,D, VU,NR by AN at 11/7/2019 10:08 AM    Comment:  Safety with ADLS; UE ex's for upper UE.    Acceptance, E,D,TB, VU,DU,NR by VIVIAN at 11/5/2019  3:35 PM    Comment:  Education reinforced for benefits of OOB/dynamic activity/therapy, role of OT and HEP to support ADLs.                    Point: Precautions (Done)     Description: Instruct learner(s) on prescribed precautions during self-care and functional transfers.    Learning Progress Summary           Patient Acceptance, E,D, VU,DU by CYNDI at 11/1/2019  4:01 PM    Comment:  Pt educated on role of OT, safety, fall prevention, ADLsm and HEP.    Acceptance, TB,E, VU,NR by  at 11/1/2019 11:13 AM    Acceptance, E,D, VU,DU by CYNDI at 10/29/2019  2:30 PM    Comment:  Pt educated on HEP.   Significant Other Acceptance, TB,E, VU,NR by  at 11/1/2019 11:13 AM                   Point: Body mechanics (Done)     Description: Instruct learner(s) on proper positioning and spine alignment during self-care, functional mobility activities and/or exercises.    Learning Progress Summary           Patient Acceptance, E,D, VU,DU by CYNDI at 11/1/2019  4:01 PM    Comment:  Pt educated on role of OT, safety, fall prevention, ADLsm and HEP.    Acceptance, TB,E, VU,NR by  at 11/1/2019 11:13 AM    Acceptance, E,D, VU,DU by  at 10/29/2019  2:30 PM    Comment:  Pt educated on HEP.   Significant Other Acceptance, TB,E, VU,NR by  at 11/1/2019 11:13 AM                               User Key     Initials Effective Dates Name Provider Type Discipline     06/08/18 -  Areli Nichols OT Occupational Therapist OT     06/22/15 -  Fela Anguiano OT Occupational Therapist OT     07/17/19 -  Roz Galvan OT Occupational Therapist OT     10/15/19 -  Stephanie Mead RN Registered Nurse Nurse                OT Recommendation and Plan  Outcome Summary/Treatment Plan (OT)  Daily Summary of Progress (OT): progress toward functional goals is good  Anticipated Discharge Disposition (OT): inpatient rehabilitation facility  Daily Summary of Progress (OT): progress toward functional goals is good  Plan of Care Review  Plan of Care Reviewed With: patient, spouse  Plan of Care Reviewed With: patient, spouse  Outcome Summary: Pt complete donning shorts with set up.  Participated EOB in UE therapeutic ex. Recommend IRF at discharge.   Outcome Measures     Row Name 11/07/19 1008 11/05/19 1504          How much help from another is currently needed...    Putting on and taking off regular lower body clothing?  3  -AN  2  -TB     Bathing (including washing, rinsing, and drying)  2  -AN  2  -TB     Toileting (which includes using toilet bed pan or urinal)  2  -AN  2  -TB     Putting on and taking off regular upper body clothing  3  -AN  3  -TB     Taking care of personal grooming (such as brushing teeth)  3  -AN  3  -TB     Eating meals  3  -AN  3  -TB     AM-PAC 6 Clicks Score (OT)  16  -AN  15  -TB        Functional Assessment    Outcome Measure Options  --  AM-PAC 6 Clicks Daily Activity (OT)  -TB       User Key  (r) = Recorded By, (t) = Taken By, (c) = Cosigned By    Initials Name Provider Type    TB Areli Nichols OT Occupational Therapist    Fela Feng OT Occupational Therapist           Time Calculation:   Time Calculation- OT     Row Name 11/07/19 1053 11/07/19 1008          Time Calculation- OT    OT Start Time  1008  -AN  --     Total Timed Code Minutes- OT  26 minute(s)  -AN  --     OT Received On  11/07/19  -AN  --     OT Goal Re-Cert Due Date  11/12/19  -AN  --        Timed Charges    95194 - OT Therapeutic Exercise Minutes  --  18  -AN     03050 - OT Therapeutic Activity Minutes  --  8  -AN       User Key  (r) = Recorded By, (t) = Taken By, (c) = Cosigned By    Initials Name Provider Type    Fela Feng OT Occupational Therapist        Therapy Charges for Today     Code Description Service Date Service Provider Modifiers Qty    61993099774 HC OT THER PROC EA 15 MIN 11/7/2019 Fela Anguiano OT GO 1    01614467578 HC OT THERAPEUTIC ACT EA 15 MIN 11/7/2019 Fela Anguiano OT GO 1               Fela Anguiano OT  11/7/2019

## 2019-11-07 NOTE — PLAN OF CARE
Problem: Fall Risk (Adult)  Goal: Absence of Fall  Outcome: Ongoing (interventions implemented as appropriate)      Problem: Patient Care Overview  Goal: Plan of Care Review  Outcome: Ongoing (interventions implemented as appropriate)   11/07/19 0600   Coping/Psychosocial   Plan of Care Reviewed With patient   Plan of Care Review   Progress no change   OTHER   Outcome Summary Pt A&O x4. VSS on RA. No drainage from previous GALO drain site; dressing in place for precaution. No complaints of pain/discomfort. Spouse at bedside; participating in care. Will continue to monitor.        Problem: Skin Injury Risk (Adult)  Goal: Skin Health and Integrity  Outcome: Ongoing (interventions implemented as appropriate)

## 2019-11-07 NOTE — PROGRESS NOTES
[facility] Progress Note        Antibiotics:  Anti-Infectives (From admission, onward)    Ordered     Dose/Rate Route Frequency Start Stop    19 0848  fluconazole (DIFLUCAN) IVPB 200 mg     Ordering Provider:  Usman Dong MD    200 mg  100 mL/hr over 60 Minutes Intravenous Daily 19 0945 19 0859    10/30/19 0725  fluconazole (DIFLUCAN) IVPB 200 mg     Ordering Provider:  Usman Dong MD    200 mg  over 60 Minutes Intravenous Daily 10/30/19 0900 19 1028    10/28/19 0743  piperacillin-tazobactam (ZOSYN) 3.375 g in iso-osmotic dextrose 50 ml (premix)     Ordering Provider:  Usman Dong MD    3.375 g  over 4 Hours Intravenous Every 8 Hours 10/28/19 1700 19 1659    10/28/19 0743  piperacillin-tazobactam (ZOSYN) 3.375 g in iso-osmotic dextrose 50 ml (premix)     Ordering Provider:  Usman Dong MD    3.375 g  over 30 Minutes Intravenous Once 10/28/19 1100 10/28/19 1303    10/26/19 2040  meropenem (MERREM) 1 g/100 mL 0.9% NS VTB (mbp)     Ordering Provider:  Susan Infante APRN    1 g  over 30 Minutes Intravenous Once 10/26/19 2130 10/26/19 2241          CC: fatigue    HPI:    Patient is a 49 y.o.  Yr old male with history of poorly contolled T2DM, DUNLAP/liver cirrhosis, HTN, PVD/Left BKA, and multiple skin abscesses/MRSA who presented to [facility] ED with right shin wound infection summer 2018.  He was unable to get to see Dr. Martinez as his father passed away unexpectedly on 18 and he had to wait until after the .  The wound worsened becoming gangrenous and he came to [facility] ED in 2018.  He also had been hospitalized at [facility] and then transferred to  for a severe penis/scrotum infection requiring surgical debridement in summer 2018.  He received antibiotics regarding his right leg infection, generally improved over the remainder of summer 2018 but that area had not completely healed. He was admitted 2019 with acute left  lower abdominal wall redness/swelling and pain, spontaneous drainage associated with fever/chills and uncontrolled blood sugars.  4/26 I&D requiring wide debridement , severe/deep infection and transferred to [facility] on May 3 with IV Zosyn/oral doxycycline. Cultures had lactobacillus and mixed gram-positive skin sherly including alpha strep, staph epidermidis and corynebacterium. Readmitted to [facility] May 18, 2019, increasing generalized edema ultimately transitioned to oral doxycycline and healed.     He presented to the emergency room July 30, 2019 with acute rectal pain that had begun 7/27; this is associated with constipation for days, chills and nausea/dry heaving.  White blood cell count elevated, high hemoglobin A1c over 13, urinalysis with hematuria/pyuria and CT scan with 3 x 3 cm fluid collection anterior to the distal rectum and per discussion with Dr. Akbar/Jennifer, this is not in the prostate.  Colorectal surgery/urology saw him for consideration of surgical options/timing/threshold, etc..     8/2/19 I&Dper Dr Payne perirectal abscess; patient reports that he had instrumented his rectum prior to hospitalization with enema     8/3/19 urinary retention overnight requiring in/out catheterization per patient.  Creat climb     8/4/19 further creat climb; childs placed and lisinopril stopped and d/w Dr Rubio;  ?obstruction initially associated with retention postop (1200 out with I/O cath postop per nursing) -v- contrast from CT -v- lisinopril/medication/vancomycin -v- relative hypotension -v- likely combination of factors with ATN; nephrology following; vancomycin trough high and vancomycin stopped empirically 8/3 although high trough potentially accumulation as a consequence of diminishing renal function associated with retention/contrast/pressure rather than cause.  Nonetheless, avoid for now; creatinine trending down.        8/7 in retrospect he remembers air in his urine at admit which  "has raised concern for possible fistula from urinary tract;  No fecaluria and culture from abscess is fecal sherly;  ?rectal-urethral fistula;  Dr Payne aware     Culture with E. coli/Klebsiella species and creatinine generally trended down, transitioned to oral antibiotics at discharge.     Readmitted August 17, 2019 with nausea/vomiting/dry heaves for 3 days.  Walker catheter was placed and CT scan of the abdomen showed:      \"Previously seen fluid collection identified inferior to the prostate gland is more increased in density on today's examination. There is wall thickening again seen throughout the bladder. Findings again are concerning for cystitis.\" per radiology     He was transitioned to oral omnicef/topical antifungal on approx 8/23/19; Noncompliant with f/u in our office     READMITTED 10/8/19 RLQ abd pain, urinary retention, nausea, dysuria and generalized fatigue     UTI by U/A, subsequent blood cultures positive for  kleb sp, urine with kleb and e coli ;  Abnormal CT abd with right perinephric fluid collection, advanced cirrhosis, urinary bladder thickening.  10/9 Aspirate of perinephric fluid collection consistent with abscess/kleb and white blood cells; 10/16/19 cytoscopy with bullous change per Dr Gutierres but no fistula per him; 10/19/19 intermittent nausea, no vomiting or dry heaves this am, intermittent dry cough broadened to zosyn with ?aspiration pneumonia evolving after dry heaves/vomiting and had intermittent diarrhea, oral vancomycin added empirically with history of prior C. Difficile; improved with IV Zosyn/oral vancomycin and he refused consideration of placement.  He insisted on home, discharged October 23 and noncompliant with outpatient therapy and outpatient follow-up October 25.  He had become fatigued such that his family could not assist him out of bed and it was recommended by my office that he return to the emergency room October 25.  He apparently refused that but did come to the " emergency room October 26.     Readmitted October 26, 2019 with generalized weakness, fatigue/malaise and nausea/vomiting/diarrhea.  CT scan revealed increased size of perinephric fluid collection per radiology.     On October 28, patient had reported increased diarrhea, at least 6 episodes overnight and watery/thin but no hematochezia melena or hematemesis.  Vomiting has subsided     10/29 drain placed    11/5/19 drain inadvertently out overnight per him    11/7/19 doing ok per him;  GI habits variable, but no vomiting today; stool consistency varies and not always diarrhea per family; vague abdominal discomfort that he describes as dull, intermittent, nonradiating, worse with palpation, generally better with pain meds and 2-3 out of 10.  No dysuria hematuria or pyuria.  Denies new hesitancy urgency or flank pain.      No headache photophobia or neck stiffness.  No shortness of breath cough or hemoptysis.  No fevers chills or sweats.  No rash.  No other particular exposures     ROS:      11/7/19 No f/c/s. No vomiting. No rash. No new ADR to Abx.     Constitutional-- No Fever, chills or sweats.  Appetite diminished with generalized malaise and fatigue  Heent-- No new vision, hearing or throat complaints.  No epistaxis or oral sores.  Denies odynophagia or dysphagia.  No flashers, floaters or eye pain. No odynophagia or dysphagia. No headache, photophobia or neck stiffness.  CV-- No chest pain, palpitation or syncope  Resp-- No SOB/cough/Hemoptysis  GI-as above.  No hematochezia, melena, or hematemesis. Denies jaundice or chronic liver disease.  -- No dysuria, hematuria, or flank pain.  Denies hesitancy or flank pain.  Lymph- no swollen lymph nodes in neck/axilla or groin.   Heme- No active bruising or bleeding; no Hx of DVT or PE.  MS-- no swelling or pain in the bones or joints of arms/legs.  No new back pain.  Neuro-- No acute focal weakness or numbness in the arms or legs.  No seizures.     Full 12 point review  "of systems reviewed and negative otherwise for acute complaints, except for above      PE:   BP (!) 168/102   Pulse 82   Temp 98.4 °F (36.9 °C) (Oral)   Resp 18   Ht 190.5 cm (75\")   Wt 121 kg (267 lb)   SpO2 98%   BMI 33.37 kg/m²     GENERAL: Awake and alert, in no acute distress.   HEENT: Normocephalic, atraumatic.  PERRL. EOMI. No conjunctival injection. No icterus. Oropharynx clear without evidence of thrush or exudate. No evidence of peridontal disease.    NECK: Supple without nuchal rigidity. No mass.  LYMPH: No cervical, axillary or inguinal lymphadenopathy.  HEART: RRR; No murmur, rubs, gallops.   LUNGS: Diminished at bases to auscultation bilaterally with slight rhonchi bilateral but no wheezing or rales. Normal respiratory effort. Nonlabored. No dullness.  ABDOMEN: Soft, nontender, nondistended. Positive bowel sounds. No rebound or guarding. NO mass or HSM.  EXT:  No cyanosis, clubbing or edema. No cord.  : vague erythema/sattelite lesions c/w cutaneous candidiasis  MSK: FROM without joint effusions noted arms/legs.    SKIN: Warm and dry without cutaneous eruptions on Inspection/palpation.    NEURO: Oriented to PPT. No focal deficits on motor/sensory exam at arms/legs.     No peripheral stigmata/phenomena of endocarditis     PICC line without obvious redness or drainage     Laboratory Data    Results from last 7 days   Lab Units 11/07/19  0621 11/06/19  0813 11/05/19  0457   WBC 10*3/mm3 11.73* 11.43* 12.11*   HEMOGLOBIN g/dL 9.6* 9.0* 9.5*   HEMATOCRIT % 31.3* 29.5* 30.9*   PLATELETS 10*3/mm3 386 373 402     Results from last 7 days   Lab Units 11/07/19  0634   SODIUM mmol/L 136   POTASSIUM mmol/L 3.8   CHLORIDE mmol/L 98   CO2 mmol/L 25.0   BUN mg/dL 10   CREATININE mg/dL 0.62*   GLUCOSE mg/dL 186*   CALCIUM mg/dL 8.5*     Results from last 7 days   Lab Units 11/07/19  0634   ALK PHOS U/L 224*   BILIRUBIN mg/dL <0.2*   ALT (SGPT) U/L 21   AST (SGOT) U/L 24               Estimated Creatinine " Clearance: 197.6 mL/min (A) (by C-G formula based on SCr of 0.62 mg/dL (L)).      Microbiology:      Radiology:  Imaging Results (last 72 hours)     Procedure Component Value Units Date/Time    CT Abdomen Pelvis With Contrast [[id number]] Collected:  10/26/19 2035     Updated:  10/26/19 2037    Narrative:       CT ABDOMEN AND PELVIS WITH CONTRAST, 10/26/2019    HISTORY:  49-year-old male with recent history of a right perinephric fluid collection that underwent diagnostic needle aspiration 10/9/2019. He has been on IV antibiotic therapy for possible perinephric abscess. Examination is requested today for follow-up.    TECHNIQUE:  CT imaging of the abdomen and pelvis with IV contrast. Radiation dose reduction techniques included automated exposure control. Radiation audit for CT and nuclear cardiology exams in the last 12 months: 0.    ABDOMEN FINDINGS:  Rim-enhancing pericapsular or subcapsular fluid collection along the posterior margin of the lower pole right kidney has enlarged since 10/18/2019. It previously measured about 5.7 x 3.9 cm axially and currently measures 6.0 x 4.8 cm in the same location  (see axial image 48). There is associated perinephric soft tissue stranding, most likely inflammatory. The findings are compatible with clinically suspected perinephric abscess.    Both kidneys enhance normally. There is no evidence of upper urinary tract obstruction.    Liver, pancreas and spleen are within normal limits. No bile duct or pancreatic duct dilatation.    Small bowel and colon are normal in caliber and appearance without GI contrast. Normal appendix. No gastric distention, hiatal hernia or distal esophageal dilatation.    PELVIS FINDINGS:  Urinary bladder, prostate and rectum are within normal limits.    Lung base images show a tiny right pleural effusion.      Impression:       1.  Likely perinephric or subcapsular abscess along the posterior aspect of the lower pole right kidney. This has enlarged  since 10/18/2019.  2.  Normal renal parenchymal enhancement. No CT evidence of pyelonephritis at this time. No evidence of upper urinary tract obstruction.  3.  Tiny right pleural effusion.    Signer Name: Fadi Healy MD   Signed: 10/26/2019 8:35 PM   Workstation Name: [email or web address]    [facility]            Impression:      --Acute Kleb septicemia/sepsis on treatment since last admit, likely urinary source/pyelnoephritis associated with urinary retention and  with abnormal CT scan with perinephric collection/abscess and Kleb on aspirate that has persisted as of aspirate 10/29 arguing relatively sequestered focus not penetrated by systemic abx and now with drain placed 10/29;  IV  Zosyn ongoing; urology to determine any timing/option or threshold for further intervention such as surgical debridement if not responsive to drain/abx, or other functional/anatomic assessment.  DRAIN INADVERTENTLY OUT 11/4;  Monitor clinically, likely to need repeat CT to evaluate in upcoming days;  D/w Dr Forbes     --Acute perinephric abscess as above;   Urology following to help guide timing/option/threshold for further drainage (perc -v- other);  Perc drain 10/29 and kleb persists in culture    --urinary frequency with cutaneous candidiasis and candiduria/pyruria;  2 brief courses diflucan     --abnormal imaging with dry cough and probable pneumonia last admission;  At risk for aspiration with recent vomiting/dry heaves and empiric zosyn started 10/19; changed to Invanz 10/23 to help facilitate home therapy with mom and changed back to Zosyn October 28;   no productive cough at present, but if it develops, then send for culture     --acute  diarrhea 10/27-10/28 with Hx CDiff per him/family and recent empiric oral vancomycin, CDT negative and oral vancomycin stopped;  If recurrent diarrhea then consider more wrolup or empiric therapy     --Hx perirectal abscess ; Colorectal surgery, Dr. Payne previously  saw and is following.  Drainage as above on August 2 with mxed Fecal sherly in culture; CT scans  with no mention of abscess on 8/18 study;   any timing/option or threshold for further GI/colorectal work-up per Dr holley/JACKSON     --History urinary retention.  urology following     --Hx ARF in August 2019;  ?obstruction initially associated with retention postop (1200 out with I/O cath postop per nursing) -v- contrast from CT -v- lisinopril/medication/vancomycin -v- relative hypotension -v- likely combination of factors with ATN;  vancomycin trough high and vancomycin stopped empirically 8/3 although high trough potentially accumulation as a consequence of diminishing renal function associated with retention/contrast/pressure rather than cause.  Nonetheless, avoiding for now; likely further acute kidney injury at admission August 17 associated with dehydration/prerenal/ATN etiology     --History MRSA;  Not in current culture     --Diabetes mellitus type 2, uncontrolled.  He reports the reason for this is forgetfulness in past     --History [facility] and chronic liver disease/cirrhosis  per past notes     --Left BKA     --Peripheral vascular disease     --medical noncompliance and inability to care for self at home.       PLAN:      --IV Zosyn; IV diflucan      --Check/review labs cultures and scans     --Discussed with microbiology     --History per nursing staff      --Discussed with family, partial history per them     --Highly complex set of issues with high risk for further serious morbidity and other serious sequela     --Colorectal surgery and urology have followed; urology following to determine timing/option or threshold for further percutaneous aspiration/drainage versus other surgery regarding  abscess     --D/w Dr Andrei Dong MD  11/7/2019

## 2019-11-07 NOTE — PROGRESS NOTES
Continued Stay Note   [facility]     Patient Name: Kendrick Crystal  MRN: [id number]  Today's Date: 11/7/2019    Admit Date: 10/26/2019    Discharge Plan     Row Name 11/07/19 0906       Plan    Plan  [facility]    Patient/Family in Agreement with Plan  yes    Plan Comments  Spoke with patient at bedside. Encouraged him to work with therapy. Denies any needs at this time. CM will continue to follow.    Final Discharge Disposition Code  62 - inpatient rehab facility        Discharge Codes    No documentation.       Expected Discharge Date and Time     Expected Discharge Date Expected Discharge Time    Nov 8, 2019             Fadi Valentine RN

## 2019-11-07 NOTE — PROGRESS NOTES
[facility] Medicine Services  PROGRESS NOTE    Patient Name: Kendrick Crystal  : 1970  MRN: [id number]    Date of Admission: 10/26/2019  Primary Care Physician: Provider, No Known    Subjective   Subjective     CC:  Infection    HPI:  Patient is a 49-year-old with complicated hospital course admitted for recurrent Klebsiella bacteremia with perinephric abscess, status post drain (inadvertently removed on 2019).     - Patient states he feels fine as of right now.  His drain was accidentally dislodged yesterday.  I discussed his case with Dr. Dong and agreed and close monitoring with repeat CT scan after 24 hours or sooner if he gets sick.  Patient is tolerating p.o. well.  He denies nausea vomiting or constipation.  He is ambulating some.  He denies new concerns.     -patient denies new complaints.  He has continued to have intermittent air from his urethra.  His wife states she was told to monitor this by the urologist as well as Dr. Payne.  No fever.  Labs are pending this morning.  Will discuss timing of ordering the repeat CT scan with Dr. Dong.  He denies any increased pain.     -patient continues to complain of pneumaturia, he has more abd distention today, he reports past hx of DUNLAP cirrhosis with ascites. I discussed his case with Dr. Gutierres. Requested consult with Dr Payne as well given his pneumaturia. Also requested US abd. tolerating p.o. without emesis but does report some nausea.  Ambulating some but globally weak    Review of Systems  General: No fever, chills, fatigue  ENT: No sore throat, trouble swallowing or changes in vision  Respiratory: No shortness of breath, cough, wheezing or fast breathing  Cardiovascular: No chest pain, palpitations, dyspnea with exertion  Gastrointestinal: No nausea vomiting, positive abdominal pain  Musculoskeletal: Positive difficulty walking due to weakness  Vascular: No cyanosis or clubbing  Lymphatic: No peripheral  edema, no lymphadenopathy  Neurologic: No headache, confusion, dizziness  Psychiatric: No changes in mood.  No depression or anxiety.    Objective   Objective     Vital Signs:   Temp:  [97.9 °F (36.6 °C)-98.5 °F (36.9 °C)] 97.9 °F (36.6 °C)  Heart Rate:  [78-82] 78  Resp:  [16-18] 18  BP: (137-178)/() 171/95        Physical Exam:  Constitutional: No acute distress, awake, alert, nontoxic, overweight body habitus  Eyes: Pupils equal, sclerae anicteric, no conjunctival injection  HENT: NCAT, mucous membranes moist  Neck: Supple, no thyromegaly, no lymphadenopathy  Respiratory: Clear to auscultation bilaterally, good effort, nonlabored respirations   Cardiovascular: RRR  Gastrointestinal: Positive bowel sounds, soft, mildly tender, mildly distended, obese  Musculoskeletal: No peripheral edema, normal muscle tone for age, left BKA  Psychiatric: Appropriate affect, good insight and judgement, cooperative  Neurologic: Oriented x 3, movements symmetric BUE and BLE, Cranial Nerves grossly intact to confrontation, speech clear and fluent    Results Reviewed:    Results from last 7 days   Lab Units 11/07/19  0621 11/06/19  0813 11/05/19  0457   WBC 10*3/mm3 11.73* 11.43* 12.11*   HEMOGLOBIN g/dL 9.6* 9.0* 9.5*   HEMATOCRIT % 31.3* 29.5* 30.9*   PLATELETS 10*3/mm3 386 373 402     Results from last 7 days   Lab Units 11/07/19  0634 11/06/19  0813 11/05/19  0457 11/03/19  0804 11/02/19  0841   SODIUM mmol/L 136 135* 140 138 137   POTASSIUM mmol/L 3.8 4.1 3.9 3.7 4.3   CHLORIDE mmol/L 98 102 101 102 105   CO2 mmol/L 25.0 23.0 25.0 25.0 24.0   BUN mg/dL 10 10 12 11 14   CREATININE mg/dL 0.62* 0.50* 0.50* 0.45* 0.51*   GLUCOSE mg/dL 186* 226* 166* 227* 158*   CALCIUM mg/dL 8.5* 8.1* 8.7 8.6 8.3*   ALT (SGPT) U/L 21  --   --  17 14   AST (SGOT) U/L 24  --   --  20 11     Estimated Creatinine Clearance: 197.6 mL/min (A) (by C-G formula based on SCr of 0.62 mg/dL (L)).    Microbiology Results Abnormal     Procedure Component  Value - Date/Time    Ova & Parasite Examination - Stool, Per Rectum [[id number]] Collected:  10/30/19 0507    Lab Status:  Final result Specimen:  Stool from Per Rectum Updated:  11/05/19 1353     Ova + Parasite Exam No ova or parasites seen on concentrated smear     Trichrome Stain No ova or parasites seen on trichrome stain    Stool Culture (Reference Lab) - Stool, Per Rectum [[id number]] Collected:  10/30/19 0507    Lab Status:  Final result Specimen:  Stool from Per Rectum Updated:  11/04/19 1708     Salmonella/Shigella Screen Final report     Result 1 Comment     Comment: No Salmonella or Shigella recovered.        Campylobacter Culture Final report     Result 1 Comment     Comment: No Campylobacter species isolated.        E coli, Shiga toxin Assay Negative    Narrative:       Performed at:  [address] [place] [address]  [id number]  : Dl Laird PhD, Phone:  [phone]    Gastrointestinal Panel, PCR ([facility]) - Stool, Per Rectum [[id number]] Collected:  10/30/19 0507    Lab Status:  Final result Specimen:  Stool from Per Rectum Updated:  11/01/19 1357     Reference Lab Report See Attached Report     Comment: See scanned report       Blood Culture - Blood, Wrist, Right [[id number]] Collected:  10/26/19 1805    Lab Status:  Final result Specimen:  Blood from Wrist, Right Updated:  10/31/19 2000     Blood Culture No growth at 5 days    Blood Culture - Blood, Hand, Right [[id number]] Collected:  10/26/19 1822    Lab Status:  Final result Specimen:  Blood from Hand, Right Updated:  10/31/19 2000     Blood Culture No growth at 5 days    Body Fluid Culture - Body Fluid, Kidney, Right [[id number]]  (Abnormal)  (Susceptibility) Collected:  10/29/19 1057    Lab Status:  Final result Specimen:  Body Fluid from Kidney, Right Updated:  10/31/19 0624     Body Fluid Culture Scant growth (1+) Klebsiella pneumoniae ssp pneumoniae     Gram Stain Moderate (3+) WBCs seen      No organisms seen     Susceptibility      Klebsiella pneumoniae ssp pneumoniae     EYAD     Ampicillin Resistant     Ampicillin + Sulbactam Susceptible     Cefazolin Susceptible     Cefepime Susceptible     Ceftazidime Susceptible     Ceftriaxone Susceptible     Gentamicin Susceptible     Levofloxacin Susceptible     Piperacillin + Tazobactam Susceptible     Trimethoprim + Sulfamethoxazole Susceptible                    Clostridium Difficile Toxin - Stool, Per Rectum [[id number]] Collected:  10/30/19 0507    Lab Status:  Final result Specimen:  Stool from Per Rectum Updated:  10/30/19 0826    Narrative:       The following orders were created for panel order Clostridium Difficile Toxin - Stool, Per Rectum.  Procedure                               Abnormality         Status                     ---------                               -----------         ------                     Clostridium Difficile To...[[id number]]  Normal              Final result                 Please view results for these tests on the individual orders.    Clostridium Difficile Toxin, PCR - Stool, Per Rectum [[id number]]  (Normal) Collected:  10/30/19 0507    Lab Status:  Final result Specimen:  Stool from Per Rectum Updated:  10/30/19 0826     C. Difficile Toxins by PCR Not Detected    Narrative:       Performance characteristics of test not established for patients <2 years of age.  Negative for Toxigenic C. Difficile    Urine Culture - Urine, Urine, Clean Catch [[id number]]  (Abnormal) Collected:  10/28/19 1341    Lab Status:  Final result Specimen:  Urine, Clean Catch Updated:  10/29/19 1033     Urine Culture Yeast isolated          Imaging Results (Last 24 Hours)     ** No results found for the last 24 hours. **          Results for orders placed during the hospital encounter of 05/18/19   Adult Transthoracic Echo Complete W/ Cont if Necessary Per Protocol    Narrative · Estimated EF = 60%.  · Mild mitral valve regurgitation is present  · Mild tricuspid valve  regurgitation is present.  · Calculated right ventricular systolic pressure from tricuspid   regurgitation is 29 mmHg.          I have reviewed the medications:  Scheduled Meds:    amLODIPine 10 mg Oral Q24H   aspirin 81 mg Oral Daily   DULoxetine 60 mg Oral Daily   fluconazole 200 mg Intravenous Daily   heparin (porcine) 5,000 Units Subcutaneous Q8H   hydrALAZINE 25 mg Oral Q8H   insulin detemir 22 Units Subcutaneous Q12H   insulin lispro 0-7 Units Subcutaneous 4x Daily With Meals & Nightly   insulin lispro 5 Units Subcutaneous TID With Meals   metoprolol tartrate 100 mg Oral Q12H   nystatin  Topical BID   piperacillin-tazobactam 3.375 g Intravenous Q8H   saccharomyces boulardii 250 mg Oral BID   sevelamer 800 mg Oral TID With Meals   sodium chloride 10 mL Intravenous Q12H   terazosin 5 mg Oral Nightly     Continuous Infusions:     PRN Meds:.•  acetaminophen **OR** acetaminophen **OR** acetaminophen  •  dextrose  •  dextrose  •  glucagon (human recombinant)  •  magnesium sulfate **OR** magnesium sulfate **OR** magnesium sulfate  •  ondansetron  •  potassium chloride **OR** potassium chloride **OR** potassium chloride  •  Insert peripheral IV **AND** sodium chloride  •  sodium chloride      Assessment/Plan   Assessment / Plan     Active Hospital Problems    Diagnosis  POA   • Perinephric abscess [N15.1]  Yes   • Peripheral vascular disease (CMS/HCC) [I73.9]  Yes   • S/P BKA (below knee amputation), left (CMS/[facility]) [Z89.512]  Not Applicable   • Diabetic ulcer of right lower leg (CMS/[facility]) [E11.622, L97.919]  Yes   • Type 2 diabetes mellitus with circulatory disorder and uncontrolled [E11.59]  Yes   • Hyperlipemia [E78.5]  Yes   • Essential hypertension [I10]  Yes      Resolved Hospital Problems   No resolved problems to display.        Brief Hospital Course to date:  Kendrick Crystal is a 49 y.o. male admitted with acute sepsis secondary to recurrent klebsiella bacteremia with perinephric abscess, status post drain  (inadvertently removed on 11/4/2019).    Sepsis secondary to perinephric abscess  -klebsiella grew from culture, continue zosyn and diflucan  -Dr. Dong follows  -Consulted Urology Dr Gutierres, drain inadvertently removed on 11/4/2019  -Plan to monitor for 24-48 hours then repeat CT if any clinical decline as discussed with infectious disease     Abdominal distention with history of Johnston cirrhosis and ascites  -Requested complete ultrasound of the abdomen    Pneumaturia  -Discussed with Dr. Gutierres  -Requested consult with Dr. Payne who repaired his fistula in the past    Left BKA    Hypomag  -replace per protocol     Diabetes mellitus, poorly controlled  -continue basal/bolus, adjust as needed.   -Glucose range 128-209    Hypertension  -Continue amlodipine and metoprolol  -Hytrin 5 mg HS     Hx of C-diff  -loose stool resolved     Depression  -continue cymbalta 30 mg daily     Wound to the right ankle  -wound care     Generalized weakness  -encouraged participation with PT     Medical Non-compliance  -complicates care      DVT Prophylaxis: Heparin      CODE STATUS:   Code Status and Medical Interventions:   Ordered at: 10/26/19 2039     Level Of Support Discussed With:    Patient     Code Status:    CPR     Medical Interventions (Level of Support Prior to Arrest):    Full         Electronically signed by Jerri Forbes MD, 11/07/19, 2:05 PM.

## 2019-11-08 ENCOUNTER — APPOINTMENT (OUTPATIENT)
Dept: GENERAL RADIOLOGY | Facility: HOSPITAL | Age: 49
End: 2019-11-08

## 2019-11-08 PROBLEM — R39.89 PNEUMATURIA: Status: ACTIVE | Noted: 2019-11-08

## 2019-11-08 LAB
GLUCOSE BLDC GLUCOMTR-MCNC: 119 MG/DL (ref 70–130)
GLUCOSE BLDC GLUCOMTR-MCNC: 123 MG/DL (ref 70–130)
GLUCOSE BLDC GLUCOMTR-MCNC: 161 MG/DL (ref 70–130)
GLUCOSE BLDC GLUCOMTR-MCNC: 184 MG/DL (ref 70–130)

## 2019-11-08 PROCEDURE — 25010000002 PIPERACILLIN SOD-TAZOBACTAM PER 1 G: Performed by: INTERNAL MEDICINE

## 2019-11-08 PROCEDURE — 25010000002 FLUCONAZOLE PER 200 MG: Performed by: INTERNAL MEDICINE

## 2019-11-08 PROCEDURE — BT1B1ZZ FLUOROSCOPY OF BLADDER AND URETHRA USING LOW OSMOLAR CONTRAST: ICD-10-PCS | Performed by: RADIOLOGY

## 2019-11-08 PROCEDURE — 25010000002 HEPARIN (PORCINE) PER 1000 UNITS: Performed by: NURSE PRACTITIONER

## 2019-11-08 PROCEDURE — 63710000001 INSULIN DETEMIR PER 5 UNITS: Performed by: INTERNAL MEDICINE

## 2019-11-08 PROCEDURE — 74455 X-RAY URETHRA/BLADDER: CPT

## 2019-11-08 PROCEDURE — 82962 GLUCOSE BLOOD TEST: CPT

## 2019-11-08 PROCEDURE — 63710000001 INSULIN LISPRO (HUMAN) PER 5 UNITS: Performed by: NURSE PRACTITIONER

## 2019-11-08 PROCEDURE — 0 IOTHALAMATE MEGLUMINE 17.2 % SOLUTION: Performed by: FAMILY MEDICINE

## 2019-11-08 PROCEDURE — 99232 SBSQ HOSP IP/OBS MODERATE 35: CPT | Performed by: FAMILY MEDICINE

## 2019-11-08 RX ORDER — OXYCODONE HYDROCHLORIDE AND ACETAMINOPHEN 5; 325 MG/1; MG/1
1 TABLET ORAL 2 TIMES DAILY PRN
Status: DISCONTINUED | OUTPATIENT
Start: 2019-11-08 | End: 2019-11-15 | Stop reason: HOSPADM

## 2019-11-08 RX ADMIN — TAZOBACTAM SODIUM AND PIPERACILLIN SODIUM 3.38 G: 375; 3 INJECTION, SOLUTION INTRAVENOUS at 16:19

## 2019-11-08 RX ADMIN — IOTHALAMATE MEGLUMINE 600 ML: 172 INJECTION URETERAL at 15:00

## 2019-11-08 RX ADMIN — NYSTATIN: 100000 CREAM TOPICAL at 08:36

## 2019-11-08 RX ADMIN — DULOXETINE HYDROCHLORIDE 60 MG: 60 CAPSULE, DELAYED RELEASE ORAL at 08:36

## 2019-11-08 RX ADMIN — OXYCODONE HYDROCHLORIDE AND ACETAMINOPHEN 1 TABLET: 5; 325 TABLET ORAL at 22:27

## 2019-11-08 RX ADMIN — SEVELAMER CARBONATE 0.8 G: 0.8 POWDER, FOR SUSPENSION ORAL at 08:37

## 2019-11-08 RX ADMIN — SODIUM CHLORIDE, PRESERVATIVE FREE 10 ML: 5 INJECTION INTRAVENOUS at 08:36

## 2019-11-08 RX ADMIN — HYDRALAZINE HYDROCHLORIDE 25 MG: 25 TABLET, FILM COATED ORAL at 22:27

## 2019-11-08 RX ADMIN — TAZOBACTAM SODIUM AND PIPERACILLIN SODIUM 3.38 G: 375; 3 INJECTION, SOLUTION INTRAVENOUS at 01:59

## 2019-11-08 RX ADMIN — METOPROLOL TARTRATE 100 MG: 100 TABLET ORAL at 08:36

## 2019-11-08 RX ADMIN — INSULIN DETEMIR 22 UNITS: 100 INJECTION, SOLUTION SUBCUTANEOUS at 22:33

## 2019-11-08 RX ADMIN — TAZOBACTAM SODIUM AND PIPERACILLIN SODIUM 3.38 G: 375; 3 INJECTION, SOLUTION INTRAVENOUS at 08:36

## 2019-11-08 RX ADMIN — INSULIN LISPRO 5 UNITS: 100 INJECTION, SOLUTION INTRAVENOUS; SUBCUTANEOUS at 08:35

## 2019-11-08 RX ADMIN — TERAZOSIN HYDROCHLORIDE ANHYDROUS 5 MG: 5 CAPSULE ORAL at 22:32

## 2019-11-08 RX ADMIN — ASPIRIN 81 MG: 81 TABLET, COATED ORAL at 08:36

## 2019-11-08 RX ADMIN — NYSTATIN: 100000 CREAM TOPICAL at 22:29

## 2019-11-08 RX ADMIN — HYDRALAZINE HYDROCHLORIDE 25 MG: 25 TABLET, FILM COATED ORAL at 14:21

## 2019-11-08 RX ADMIN — INSULIN LISPRO 2 UNITS: 100 INJECTION, SOLUTION INTRAVENOUS; SUBCUTANEOUS at 08:36

## 2019-11-08 RX ADMIN — METOPROLOL TARTRATE 100 MG: 100 TABLET ORAL at 22:28

## 2019-11-08 RX ADMIN — SODIUM CHLORIDE, PRESERVATIVE FREE 10 ML: 5 INJECTION INTRAVENOUS at 22:29

## 2019-11-08 RX ADMIN — HEPARIN SODIUM 5000 UNITS: 5000 INJECTION INTRAVENOUS; SUBCUTANEOUS at 14:21

## 2019-11-08 RX ADMIN — HEPARIN SODIUM 5000 UNITS: 5000 INJECTION INTRAVENOUS; SUBCUTANEOUS at 06:12

## 2019-11-08 RX ADMIN — INSULIN DETEMIR 22 UNITS: 100 INJECTION, SOLUTION SUBCUTANEOUS at 08:30

## 2019-11-08 RX ADMIN — Medication 250 MG: at 08:35

## 2019-11-08 RX ADMIN — HYDRALAZINE HYDROCHLORIDE 25 MG: 25 TABLET, FILM COATED ORAL at 06:13

## 2019-11-08 RX ADMIN — ACETAMINOPHEN 650 MG: 325 TABLET ORAL at 22:27

## 2019-11-08 RX ADMIN — FLUCONAZOLE 200 MG: 200 INJECTION, SOLUTION INTRAVENOUS at 08:38

## 2019-11-08 RX ADMIN — Medication 250 MG: at 22:26

## 2019-11-08 RX ADMIN — INSULIN LISPRO 5 UNITS: 100 INJECTION, SOLUTION INTRAVENOUS; SUBCUTANEOUS at 17:13

## 2019-11-08 RX ADMIN — INSULIN LISPRO 2 UNITS: 100 INJECTION, SOLUTION INTRAVENOUS; SUBCUTANEOUS at 17:13

## 2019-11-08 RX ADMIN — AMLODIPINE BESYLATE 10 MG: 10 TABLET ORAL at 08:36

## 2019-11-08 RX ADMIN — HEPARIN SODIUM 5000 UNITS: 5000 INJECTION INTRAVENOUS; SUBCUTANEOUS at 22:26

## 2019-11-08 RX ADMIN — OXYCODONE HYDROCHLORIDE AND ACETAMINOPHEN 1 TABLET: 5; 325 TABLET ORAL at 10:20

## 2019-11-08 NOTE — PLAN OF CARE
Problem: Patient Care Overview  Goal: Plan of Care Review  Outcome: Ongoing (interventions implemented as appropriate)   11/08/19 4991   Coping/Psychosocial   Plan of Care Reviewed With patient;spouse   Plan of Care Review   Progress improving   OTHER   Outcome Summary PATIENT ALERT AND ORIENTED. VSS ON RA. NO COMPLAINTS OF PAIN VOICED. ABDOMINAL US COMPLETED THIS SHIFT. WIFE AT BEDSIDE. BED ALARM IN PLACE. CALL LIGHT WITHIN REACH. WILL CONTINUE TO MONITOR.

## 2019-11-08 NOTE — PLAN OF CARE
Problem: Fall Risk (Adult)  Goal: Identify Related Risk Factors and Signs and Symptoms  Outcome: Ongoing (interventions implemented as appropriate)    Goal: Absence of Fall  Outcome: Ongoing (interventions implemented as appropriate)      Problem: Skin Injury Risk (Adult)  Goal: Identify Related Risk Factors and Signs and Symptoms  Outcome: Ongoing (interventions implemented as appropriate)    Goal: Skin Health and Integrity  Outcome: Ongoing (interventions implemented as appropriate)      Problem: Diabetes, Type 2 (Adult)  Goal: Signs and Symptoms of Listed Potential Problems Will be Absent, Minimized or Managed (Diabetes, Type 2)  Outcome: Ongoing (interventions implemented as appropriate)

## 2019-11-08 NOTE — PROGRESS NOTES
[facility] Medicine Services  PROGRESS NOTE    Patient Name: Kendrick Crystal  : 1970  MRN: [id number]    Date of Admission: 10/26/2019  Primary Care Physician: Provider, No Known    Subjective   Subjective     CC:  Infection    HPI:  Patient is a 49-year-old with complicated hospital course admitted for recurrent Klebsiella bacteremia with perinephric abscess, status post perinephric drain (inadvertently removed on 2019).     - Patient states he feels fine as of right now.  His drain was accidentally dislodged yesterday.  I discussed his case with Dr. Dong and agreed and close monitoring with repeat CT scan after 24 hours or sooner if he gets sick.  Patient is tolerating p.o. well.  He denies nausea vomiting or constipation.  He is ambulating some.  He denies new concerns.     -patient denies new complaints.  He has continued to have intermittent air from his urethra.  His wife states she was told to monitor this by the urologist as well as Dr. Payne.  No fever.  Labs are pending this morning.  Will discuss timing of ordering the repeat CT scan with Dr. Dong.  He denies any increased pain.     -patient continues to complain of pneumaturia, he has more abd distention today, he reports past hx of DUNLAP cirrhosis with ascites. I discussed his case with Dr. Gutierres. Requested consult with Dr Payne as well given his pneumaturia. Also requested US abd. tolerating p.o. without emesis but does report some nausea.  Ambulating some but globally weak     -patient with no new concerns but continues to complain of pneumaturia.  He is tolerating p.o.  He is going to work with physical therapy today and I encouraged him to participate as much as he can so he can go to rehab.  He is scheduled for a cystogram today.  He complains of chronic musculoskeletal pain and reports he is on Percocet as an outpatient, followed by pain management.  He is concerned that his drug screen  will be negative for Percocet if he does not start taking it.  We discussed that he has been doing okay without Percocet however he states that he has had increased pain since being without it.  He agreed to compromise with resuming Percocet 5 mg twice a day as needed as opposed to his 10 mg 4 times a day as previously prescribed.    Review of Systems  General: No fever, chills, fatigue  ENT: No sore throat, trouble swallowing or changes in vision  Respiratory: No shortness of breath, cough, wheezing or fast breathing  Cardiovascular: No chest pain, palpitations, dyspnea with exertion  Gastrointestinal: No nausea vomiting, positive abdominal pain  Musculoskeletal: Positive difficulty walking due to weakness  Vascular: No cyanosis or clubbing  Lymphatic: No peripheral edema, no lymphadenopathy  Neurologic: No headache, confusion, dizziness  Psychiatric: Has had some mood swings    Objective   Objective     Vital Signs:   Temp:  [97.6 °F (36.4 °C)-98.2 °F (36.8 °C)] 97.7 °F (36.5 °C)  Heart Rate:  [79-91] 79  Resp:  [16-18] 16  BP: (147-174)/() 159/94        Physical Exam:  Constitutional: No acute distress, awake, alert, nontoxic, overweight body habitus  Eyes: Pupils equal, sclerae anicteric, no conjunctival injection  HENT: NCAT, mucous membranes moist  Neck: Supple, no thyromegaly, no lymphadenopathy  Respiratory: Clear to auscultation bilaterally, good effort, nonlabored respirations   Cardiovascular: RRR  Gastrointestinal: Positive bowel sounds, soft, mildly tender, mildly distended, obese  Musculoskeletal: No peripheral edema, normal muscle tone for age, left BKA  Psychiatric: Appropriate affect, good insight and judgement, cooperative  Neurologic: Oriented x 3, movements symmetric BUE and BLE, Cranial Nerves grossly intact to confrontation, speech clear and fluent    Results Reviewed:    Results from last 7 days   Lab Units 11/07/19  0621 11/06/19  0813 11/05/19  0457   WBC 10*3/mm3 11.73* 11.43* 12.11*    HEMOGLOBIN g/dL 9.6* 9.0* 9.5*   HEMATOCRIT % 31.3* 29.5* 30.9*   PLATELETS 10*3/mm3 386 373 402     Results from last 7 days   Lab Units 11/07/19  0634 11/06/19  0813 11/05/19  0457 11/03/19  0804 11/02/19  0841   SODIUM mmol/L 136 135* 140 138 137   POTASSIUM mmol/L 3.8 4.1 3.9 3.7 4.3   CHLORIDE mmol/L 98 102 101 102 105   CO2 mmol/L 25.0 23.0 25.0 25.0 24.0   BUN mg/dL 10 10 12 11 14   CREATININE mg/dL 0.62* 0.50* 0.50* 0.45* 0.51*   GLUCOSE mg/dL 186* 226* 166* 227* 158*   CALCIUM mg/dL 8.5* 8.1* 8.7 8.6 8.3*   ALT (SGPT) U/L 21  --   --  17 14   AST (SGOT) U/L 24  --   --  20 11     Estimated Creatinine Clearance: 197.6 mL/min (A) (by C-G formula based on SCr of 0.62 mg/dL (L)).    Microbiology Results Abnormal     Procedure Component Value - Date/Time    Ova & Parasite Examination - Stool, Per Rectum [[id number]] Collected:  10/30/19 0507    Lab Status:  Final result Specimen:  Stool from Per Rectum Updated:  11/05/19 1353     Ova + Parasite Exam No ova or parasites seen on concentrated smear     Trichrome Stain No ova or parasites seen on trichrome stain    Stool Culture (Reference Lab) - Stool, Per Rectum [[id number]] Collected:  10/30/19 0507    Lab Status:  Final result Specimen:  Stool from Per Rectum Updated:  11/04/19 1708     Salmonella/Shigella Screen Final report     Result 1 Comment     Comment: No Salmonella or Shigella recovered.        Campylobacter Culture Final report     Result 1 Comment     Comment: No Campylobacter species isolated.        E coli, Shiga toxin Assay Negative    Narrative:       Performed at:  [address] [place] [address]  [id number]  : Dl Laird PhD, Phone:  [phone]    Gastrointestinal Panel, PCR ([facility]) - Stool, Per Rectum [[id number]] Collected:  10/30/19 0507    Lab Status:  Final result Specimen:  Stool from Per Rectum Updated:  11/01/19 1357     Reference Lab Report See Attached Report     Comment: See scanned report        Blood Culture - Blood, Wrist, Right [[id number]] Collected:  10/26/19 1805    Lab Status:  Final result Specimen:  Blood from Wrist, Right Updated:  10/31/19 2000     Blood Culture No growth at 5 days    Blood Culture - Blood, Hand, Right [[id number]] Collected:  10/26/19 1822    Lab Status:  Final result Specimen:  Blood from Hand, Right Updated:  10/31/19 2000     Blood Culture No growth at 5 days    Body Fluid Culture - Body Fluid, Kidney, Right [[id number]]  (Abnormal)  (Susceptibility) Collected:  10/29/19 1057    Lab Status:  Final result Specimen:  Body Fluid from Kidney, Right Updated:  10/31/19 0624     Body Fluid Culture Scant growth (1+) Klebsiella pneumoniae ssp pneumoniae     Gram Stain Moderate (3+) WBCs seen      No organisms seen    Susceptibility      Klebsiella pneumoniae ssp pneumoniae     EYAD     Ampicillin Resistant     Ampicillin + Sulbactam Susceptible     Cefazolin Susceptible     Cefepime Susceptible     Ceftazidime Susceptible     Ceftriaxone Susceptible     Gentamicin Susceptible     Levofloxacin Susceptible     Piperacillin + Tazobactam Susceptible     Trimethoprim + Sulfamethoxazole Susceptible                    Clostridium Difficile Toxin - Stool, Per Rectum [[id number]] Collected:  10/30/19 0507    Lab Status:  Final result Specimen:  Stool from Per Rectum Updated:  10/30/19 0850    Narrative:       The following orders were created for panel order Clostridium Difficile Toxin - Stool, Per Rectum.  Procedure                               Abnormality         Status                     ---------                               -----------         ------                     Clostridium Difficile To...[[id number]]  Normal              Final result                 Please view results for these tests on the individual orders.    Clostridium Difficile Toxin, PCR - Stool, Per Rectum [[id number]]  (Normal) Collected:  10/30/19 0507    Lab Status:  Final result Specimen:  Stool from Per Rectum Updated:   10/30/19 0826     C. Difficile Toxins by PCR Not Detected    Narrative:       Performance characteristics of test not established for patients <2 years of age.  Negative for Toxigenic C. Difficile    Urine Culture - Urine, Urine, Clean Catch [[id number]]  (Abnormal) Collected:  10/28/19 1341    Lab Status:  Final result Specimen:  Urine, Clean Catch Updated:  10/29/19 1033     Urine Culture Yeast isolated          Imaging Results (Last 24 Hours)     Procedure Component Value Units Date/Time    US Abdomen Complete [[id number]] Collected:  11/08/19 0749     Updated:  11/08/19 1000    Narrative:       EXAMINATION: US ABDOMEN COMPLETE-     INDICATION: Abdominal pain and distention, history of ascites, DUNLAP  cirrhosis; N15.1-Renal and perinephric abscess; Z86.19-Personal history  of other infectious and parasitic diseases; Z87.898-Personal history of  other specified conditions; E11.51-Type 2 diabetes mellitus with  diabetic peripheral angiopathy without gangrene; E11.65-Type 2 diabetes  mellitus with hyperglycemia; I10-Essential (primary) hypertension.      TECHNIQUE: Ultrasound abdomen complete.     COMPARISON: None.     FINDINGS: Visualized portions of the aorta and IVC are grossly  unremarkable.     Visualized portions of the pancreas within normal limits.     Liver demonstrates coarsened echotexture without focal parenchymal  lesion.     Gallbladder is present without cholelithiasis, gallbladder wall  thickening or pericholecystic fluid demonstrating minimal debris in the  dependent portion.     Common bile duct measures 2 mm in diameter at the level of the mimi  hepatis.     Right kidney measures 11 cm in length without evidence of  hydronephrosis, contour deforming mass or obvious calculi.     Left kidney measures 12 cm in length without evidence of hydronephrosis,  contour deforming mass or obvious calculi.     Spleen measures 15 cm enlarged in size with a single 1.4 cm hypoechoic  area of likely simple cyst  associated.     No ascites.       Impression:       1. Mildly coarsened echotexture of the hepatic parenchyma may represent  hepatic parenchymal process. No focal liver lesion.  2. Splenomegaly measuring up to 15 cm in craniocaudal dimension.  3. No ascites.      D:  11/08/2019  E:  11/08/2019             Results for orders placed during the hospital encounter of 05/18/19   Adult Transthoracic Echo Complete W/ Cont if Necessary Per Protocol    Narrative · Estimated EF = 60%.  · Mild mitral valve regurgitation is present  · Mild tricuspid valve regurgitation is present.  · Calculated right ventricular systolic pressure from tricuspid   regurgitation is 29 mmHg.          I have reviewed the medications:  Scheduled Meds:    amLODIPine 10 mg Oral Q24H   aspirin 81 mg Oral Daily   DULoxetine 60 mg Oral Daily   fluconazole 200 mg Intravenous Daily   heparin (porcine) 5,000 Units Subcutaneous Q8H   hydrALAZINE 25 mg Oral Q8H   insulin detemir 22 Units Subcutaneous Q12H   insulin lispro 0-7 Units Subcutaneous 4x Daily With Meals & Nightly   insulin lispro 5 Units Subcutaneous TID With Meals   metoprolol tartrate 100 mg Oral Q12H   nystatin  Topical BID   piperacillin-tazobactam 3.375 g Intravenous Q8H   saccharomyces boulardii 250 mg Oral BID   sevelamer 800 mg Oral TID With Meals   sodium chloride 10 mL Intravenous Q12H   terazosin 5 mg Oral Nightly     Continuous Infusions:     PRN Meds:.•  acetaminophen **OR** acetaminophen **OR** acetaminophen  •  dextrose  •  dextrose  •  glucagon (human recombinant)  •  magnesium sulfate **OR** magnesium sulfate **OR** magnesium sulfate  •  ondansetron  •  oxyCODONE-acetaminophen  •  potassium chloride **OR** potassium chloride **OR** potassium chloride  •  Insert peripheral IV **AND** sodium chloride  •  sodium chloride      Assessment/Plan   Assessment / Plan     Active Hospital Problems    Diagnosis  POA   • Pneumaturia [R39.89]  Clinically Undetermined   • Perinephric abscess  [N15.1]  Yes   • Peripheral vascular disease (CMS/[facility]) [I73.9]  Yes   • S/P BKA (below knee amputation), left (CMS/[facility]) [Z89.512]  Not Applicable   • Diabetic ulcer of right lower leg (CMS/[facility]) [E11.622, L97.919]  Yes   • Type 2 diabetes mellitus with circulatory disorder and uncontrolled [E11.59]  Yes   • Hyperlipemia [E78.5]  Yes   • Essential hypertension [I10]  Yes      Resolved Hospital Problems   No resolved problems to display.        Brief Hospital Course to date:  Kendrick Crystal is a 49 y.o. male admitted with acute sepsis secondary to recurrent klebsiella bacteremia with perinephric abscess, status post perinephric drain (inadvertently removed on 11/4/2019).  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     Sepsis secondary to perinephric abscess  -klebsiella grew from culture, continue zosyn and diflucan  -Dr. Dong follows  -Consulted Urology Dr Gutierres, drain inadvertently removed on 11/4/2019  -Plan to continue to monitor, repeat CT if any clinical decline as discussed with infectious disease  -Abdominal ultrasound was reassuring on 11/7/2019     Abdominal distention with history of Johnston cirrhosis and ascites  - ultrasound of the abdomen on 11/7/2019 was reassuring and did not show any signs of ascites or hydronephrosis    Pneumaturia  -Discussed with Dr. Gutierres  -Requested consult with Dr. Payne who repaired his fistula in the past  -Plan is for cystogram on 11/8/2019    Left BKA    Hypomag  -replace per protocol     Diabetes mellitus, poorly controlled  -continue basal/bolus, adjust as needed.   -Glucose range 119-186    Hypertension  -Continue amlodipine and metoprolol  -Hytrin 5 mg HS     Hx of C-diff  -loose stool resolved     Depression  -continue cymbalta 30 mg daily     Wound to the right ankle  -wound care     Generalized weakness  -encouraged participation with PT     Medical Non-compliance  -complicates  care    Chronic generalized musculoskeletal pain  -He is followed by outpatient pain management and gets a prescription for Percocet monthly  -Since he has been off the Percocet for several weeks now I have resumed at a lower dose and encouraged him to try plain Tylenol instead.    DVT Prophylaxis: Heparin      CODE STATUS:   Code Status and Medical Interventions:   Ordered at: 10/26/19 2039     Level Of Support Discussed With:    Patient     Code Status:    CPR     Medical Interventions (Level of Support Prior to Arrest):    Full         Electronically signed by Jerri Forbes MD, 11/08/19, 1:07 PM.

## 2019-11-08 NOTE — PROGRESS NOTES
"Daily Progress Note    Patient: Kendrick MADRID [facility] Day: 13    Subjective: Pt down for his VCUG.  Walker placed by nursing without difficulty. Abd. U/s without evidence of asites or claudia-renal abscess.       Objective:     /94 (BP Location: Left arm, Patient Position: Lying)   Pulse 79   Temp 97.7 °F (36.5 °C) (Oral)   Resp 16   Ht 190.5 cm (75\")   Wt 121 kg (267 lb)   SpO2 95%   BMI 33.37 kg/m²       Intake/Output Summary (Last 24 hours) at 11/8/2019 1426  Last data filed at 11/8/2019 0600  Gross per 24 hour   Intake 2342 ml   Output 1150 ml   Net 1192 ml       Review of Systems:    Physical Exam:       Lab Results (last 24 hours)     Procedure Component Value Units Date/Time    POC Glucose Once [[id number]]  (Normal) Collected:  11/08/19 1131    Specimen:  Blood Updated:  11/08/19 1138     Glucose 119 mg/dL     POC Glucose Once [[id number]]  (Abnormal) Collected:  11/08/19 0757    Specimen:  Blood Updated:  11/08/19 0759     Glucose 184 mg/dL     POC Glucose Once [[id number]]  (Abnormal) Collected:  11/07/19 2130    Specimen:  Blood Updated:  11/07/19 2131     Glucose 168 mg/dL     POC Glucose Once [[id number]]  (Normal) Collected:  11/07/19 1629    Specimen:  Blood Updated:  11/07/19 1631     Glucose 111 mg/dL           Imaging Results (Last 24 Hours)     Procedure Component Value Units Date/Time    FL Voiding Urethrocystogram [[id number]] Updated:  11/08/19 1411    US Abdomen Complete [[id number]] Collected:  11/08/19 0749     Updated:  11/08/19 1000    Narrative:       EXAMINATION: US ABDOMEN COMPLETE-     INDICATION: Abdominal pain and distention, history of ascites, DUNLAP  cirrhosis; N15.1-Renal and perinephric abscess; Z86.19-Personal history  of other infectious and parasitic diseases; Z87.898-Personal history of  other specified conditions; E11.51-Type 2 diabetes mellitus with  diabetic peripheral angiopathy without gangrene; E11.65-Type 2 diabetes  mellitus with hyperglycemia; I10-Essential " (primary) hypertension.      TECHNIQUE: Ultrasound abdomen complete.     COMPARISON: None.     FINDINGS: Visualized portions of the aorta and IVC are grossly  unremarkable.     Visualized portions of the pancreas within normal limits.     Liver demonstrates coarsened echotexture without focal parenchymal  lesion.     Gallbladder is present without cholelithiasis, gallbladder wall  thickening or pericholecystic fluid demonstrating minimal debris in the  dependent portion.     Common bile duct measures 2 mm in diameter at the level of the mimi  hepatis.     Right kidney measures 11 cm in length without evidence of  hydronephrosis, contour deforming mass or obvious calculi.     Left kidney measures 12 cm in length without evidence of hydronephrosis,  contour deforming mass or obvious calculi.     Spleen measures 15 cm enlarged in size with a single 1.4 cm hypoechoic  area of likely simple cyst associated.     No ascites.       Impression:       1. Mildly coarsened echotexture of the hepatic parenchyma may represent  hepatic parenchymal process. No focal liver lesion.  2. Splenomegaly measuring up to 15 cm in craniocaudal dimension.  3. No ascites.      D:  11/08/2019  E:  11/08/2019             Assessment/Plan: Pneumaturia.  I viewed VCUG images and no evidence of extravasation noted.  Radiologist interpretation pending.   Soheila-renal abscess.  Not visualized on abd. U/s.  Pt asymptomatic.          Patient Active Problem List   Diagnosis   • DUNLAP Cirrhosis   • Essential hypertension   • Non-compliance   • Hyperlipemia   • Hypertriglyceridemia, essential   • Sepsis affecting skin   • Type 2 diabetes mellitus with circulatory disorder and uncontrolled   • History of MRSA infection   • Diabetic ulcer of right lower leg (CMS/HCC)   • S/P BKA (below knee amputation), left (CMS/HCC)   • Peripheral vascular disease (CMS/HCC)   • DM (diabetes mellitus) type II uncontrolled, periph vascular disorder (CMS/HCC)   • Obesity (BMI  30-39.9)   • Gastroparesis   • Perinephric abscess   • Acute UTI (urinary tract infection)   • Bacteremia due to Klebsiella pneumoniae   • Pneumaturia        Diagnosis Plan   1. Perinephric abscess     2. History of sepsis     3. History of bacteremia     4. DM (diabetes mellitus) type II uncontrolled, periph vascular disorder (CMS/HCC)     5. Essential hypertension     6. S/P BKA (below knee amputation), left (CMS/[facility])     7. Impaired mobility and ADLs           Brad Gutierres MD - 11/8/2019, 2:26 PM

## 2019-11-08 NOTE — PROGRESS NOTES
Continued Stay Note   [place]     Patient Name: Kendrick Crystal  MRN: [id number]  Today's Date: 11/8/2019    Admit Date: 10/26/2019    Discharge Plan     Row Name 11/08/19 1144       Plan    Plan  [facility]    Patient/Family in Agreement with Plan  yes    Plan Comments  Spoke to patient at bedside. Again, encouraged him to work with PT. Plan is [facility], if working with PT. CM will continue to follow.    Final Discharge Disposition Code  62 - inpatient rehab facility        Discharge Codes    No documentation.       Expected Discharge Date and Time     Expected Discharge Date Expected Discharge Time    Nov 8, 2019             Fadi Valentine RN

## 2019-11-08 NOTE — PROGRESS NOTES
[facility] Progress Note        Antibiotics:  Anti-Infectives (From admission, onward)    Ordered     Dose/Rate Route Frequency Start Stop    19 0848  fluconazole (DIFLUCAN) IVPB 200 mg     Ordering Provider:  Usman Dong MD    200 mg  100 mL/hr over 60 Minutes Intravenous Daily 19 0945 19 0859    10/30/19 0725  fluconazole (DIFLUCAN) IVPB 200 mg     Ordering Provider:  Usman Dong MD    200 mg  over 60 Minutes Intravenous Daily 10/30/19 0900 19 1028    10/28/19 0743  piperacillin-tazobactam (ZOSYN) 3.375 g in iso-osmotic dextrose 50 ml (premix)     Ordering Provider:  Usman Dong MD    3.375 g  over 4 Hours Intravenous Every 8 Hours 10/28/19 1700 19 1659    10/28/19 0743  piperacillin-tazobactam (ZOSYN) 3.375 g in iso-osmotic dextrose 50 ml (premix)     Ordering Provider:  Usman Dong MD    3.375 g  over 30 Minutes Intravenous Once 10/28/19 1100 10/28/19 1303    10/26/19 2040  meropenem (MERREM) 1 g/100 mL 0.9% NS VTB (mbp)     Ordering Provider:  Susan Infante APRN    1 g  over 30 Minutes Intravenous Once 10/26/19 2130 10/26/19 2241          CC: fatigue    HPI:    Patient is a 49 y.o.  Yr old male with history of poorly contolled T2DM, DUNLAP/liver cirrhosis, HTN, PVD/Left BKA, and multiple skin abscesses/MRSA who presented to [facility] ED with right shin wound infection summer 2018.  He was unable to get to see Dr. Martinez as his father passed away unexpectedly on 18 and he had to wait until after the .  The wound worsened becoming gangrenous and he came to [facility] ED in 2018.  He also had been hospitalized at [facility] and then transferred to  for a severe penis/scrotum infection requiring surgical debridement in summer 2018.  He received antibiotics regarding his right leg infection, generally improved over the remainder of summer 2018 but that area had not completely healed. He was admitted 2019 with acute left  lower abdominal wall redness/swelling and pain, spontaneous drainage associated with fever/chills and uncontrolled blood sugars.  4/26 I&D requiring wide debridement , severe/deep infection and transferred to [facility] on May 3 with IV Zosyn/oral doxycycline. Cultures had lactobacillus and mixed gram-positive skin sherly including alpha strep, staph epidermidis and corynebacterium. Readmitted to [facility] May 18, 2019, increasing generalized edema ultimately transitioned to oral doxycycline and healed.     He presented to the emergency room July 30, 2019 with acute rectal pain that had begun 7/27; this is associated with constipation for days, chills and nausea/dry heaving.  White blood cell count elevated, high hemoglobin A1c over 13, urinalysis with hematuria/pyuria and CT scan with 3 x 3 cm fluid collection anterior to the distal rectum and per discussion with Dr. Akbar/Jennifer, this is not in the prostate.  Colorectal surgery/urology saw him for consideration of surgical options/timing/threshold, etc..     8/2/19 I&Dper Dr Payne perirectal abscess; patient reports that he had instrumented his rectum prior to hospitalization with enema     8/3/19 urinary retention overnight requiring in/out catheterization per patient.  Creat climb     8/4/19 further creat climb; childs placed and lisinopril stopped and d/w Dr Rubio;  ?obstruction initially associated with retention postop (1200 out with I/O cath postop per nursing) -v- contrast from CT -v- lisinopril/medication/vancomycin -v- relative hypotension -v- likely combination of factors with ATN; nephrology following; vancomycin trough high and vancomycin stopped empirically 8/3 although high trough potentially accumulation as a consequence of diminishing renal function associated with retention/contrast/pressure rather than cause.  Nonetheless, avoid for now; creatinine trending down.        8/7 in retrospect he remembers air in his urine at admit which  "has raised concern for possible fistula from urinary tract;  No fecaluria and culture from abscess is fecal sherly;  ?rectal-urethral fistula;  Dr Payne aware     Culture with E. coli/Klebsiella species and creatinine generally trended down, transitioned to oral antibiotics at discharge.     Readmitted August 17, 2019 with nausea/vomiting/dry heaves for 3 days.  Walker catheter was placed and CT scan of the abdomen showed:      \"Previously seen fluid collection identified inferior to the prostate gland is more increased in density on today's examination. There is wall thickening again seen throughout the bladder. Findings again are concerning for cystitis.\" per radiology     He was transitioned to oral omnicef/topical antifungal on approx 8/23/19; Noncompliant with f/u in our office     READMITTED 10/8/19 RLQ abd pain, urinary retention, nausea, dysuria and generalized fatigue     UTI by U/A, subsequent blood cultures positive for  kleb sp, urine with kleb and e coli ;  Abnormal CT abd with right perinephric fluid collection, advanced cirrhosis, urinary bladder thickening.  10/9 Aspirate of perinephric fluid collection consistent with abscess/kleb and white blood cells; 10/16/19 cytoscopy with bullous change per Dr Gutierres but no fistula per him; 10/19/19 intermittent nausea, no vomiting or dry heaves this am, intermittent dry cough broadened to zosyn with ?aspiration pneumonia evolving after dry heaves/vomiting and had intermittent diarrhea, oral vancomycin added empirically with history of prior C. Difficile; improved with IV Zosyn/oral vancomycin and he refused consideration of placement.  He insisted on home, discharged October 23 and noncompliant with outpatient therapy and outpatient follow-up October 25.  He had become fatigued such that his family could not assist him out of bed and it was recommended by my office that he return to the emergency room October 25.  He apparently refused that but did come to the " emergency room October 26.     Readmitted October 26, 2019 with generalized weakness, fatigue/malaise and nausea/vomiting/diarrhea.  CT scan revealed increased size of perinephric fluid collection per radiology.     On October 28, patient had reported increased diarrhea, at least 6 episodes overnight and watery/thin but no hematochezia melena or hematemesis.  Vomiting has subsided     10/29 drain placed    11/5/19 drain inadvertently out overnight per him    11/8/19 urology w/u ongoing with recent air in urine; doing ok per him;  GI habits variable, but no vomiting today; stool consistency varies and not always diarrhea per family; vague abdominal discomfort that he describes as dull, intermittent, nonradiating, worse with palpation, generally better with pain meds and 2-3 out of 10.  No dysuria hematuria or pyuria.  Denies new hesitancy urgency or flank pain.      No headache photophobia or neck stiffness.  No shortness of breath cough or hemoptysis.  No fevers chills or sweats.  No rash.  No other particular exposures     ROS:      11/8/19 No f/c/s. No vomiting. No rash. No new ADR to Abx.     Constitutional-- No Fever, chills or sweats.  Appetite diminished with generalized malaise and fatigue  Heent-- No new vision, hearing or throat complaints.  No epistaxis or oral sores.  Denies odynophagia or dysphagia.  No flashers, floaters or eye pain. No odynophagia or dysphagia. No headache, photophobia or neck stiffness.  CV-- No chest pain, palpitation or syncope  Resp-- No SOB/cough/Hemoptysis  GI-as above.  No hematochezia, melena, or hematemesis. Denies jaundice or chronic liver disease.  -- No dysuria, hematuria, or flank pain.  Denies hesitancy or flank pain.  Lymph- no swollen lymph nodes in neck/axilla or groin.   Heme- No active bruising or bleeding; no Hx of DVT or PE.  MS-- no swelling or pain in the bones or joints of arms/legs.  No new back pain.  Neuro-- No acute focal weakness or numbness in the arms  "or legs.  No seizures.     Full 12 point review of systems reviewed and negative otherwise for acute complaints, except for above      PE:   /100 (BP Location: Left arm, Patient Position: Lying)   Pulse 91   Temp 97.8 °F (36.6 °C) (Oral)   Resp 16   Ht 190.5 cm (75\")   Wt 121 kg (267 lb)   SpO2 98%   BMI 33.37 kg/m²     GENERAL: Awake and alert, in no acute distress.   HEENT: Normocephalic, atraumatic.  PERRL. EOMI. No conjunctival injection. No icterus. Oropharynx clear without evidence of thrush or exudate. No evidence of peridontal disease.    NECK: Supple without nuchal rigidity. No mass.  LYMPH: No cervical, axillary or inguinal lymphadenopathy.  HEART: RRR; No murmur, rubs, gallops.   LUNGS: Diminished at bases to auscultation bilaterally with slight rhonchi bilateral but no wheezing or rales. Normal respiratory effort. Nonlabored. No dullness.  ABDOMEN: Soft, nontender, nondistended. Positive bowel sounds. No rebound or guarding. NO mass or HSM.  EXT:  No cyanosis, clubbing or edema. No cord.  : vague erythema/sattelite lesions c/w cutaneous candidiasis  MSK: FROM without joint effusions noted arms/legs.    SKIN: Warm and dry without cutaneous eruptions on Inspection/palpation.    NEURO: Oriented to PPT. No focal deficits on motor/sensory exam at arms/legs.     No peripheral stigmata/phenomena of endocarditis     PICC line without obvious redness or drainage     Laboratory Data    Results from last 7 days   Lab Units 11/07/19  0621 11/06/19  0813 11/05/19  0457   WBC 10*3/mm3 11.73* 11.43* 12.11*   HEMOGLOBIN g/dL 9.6* 9.0* 9.5*   HEMATOCRIT % 31.3* 29.5* 30.9*   PLATELETS 10*3/mm3 386 373 402     Results from last 7 days   Lab Units 11/07/19  0634   SODIUM mmol/L 136   POTASSIUM mmol/L 3.8   CHLORIDE mmol/L 98   CO2 mmol/L 25.0   BUN mg/dL 10   CREATININE mg/dL 0.62*   GLUCOSE mg/dL 186*   CALCIUM mg/dL 8.5*     Results from last 7 days   Lab Units 11/07/19  0634   ALK PHOS U/L 224* "   BILIRUBIN mg/dL <0.2*   ALT (SGPT) U/L 21   AST (SGOT) U/L 24               Estimated Creatinine Clearance: 197.6 mL/min (A) (by C-G formula based on SCr of 0.62 mg/dL (L)).      Microbiology:      Radiology:  Imaging Results (last 72 hours)     Procedure Component Value Units Date/Time    CT Abdomen Pelvis With Contrast [[id number]] Collected:  10/26/19 2035     Updated:  10/26/19 2037    Narrative:       CT ABDOMEN AND PELVIS WITH CONTRAST, 10/26/2019    HISTORY:  49-year-old male with recent history of a right perinephric fluid collection that underwent diagnostic needle aspiration 10/9/2019. He has been on IV antibiotic therapy for possible perinephric abscess. Examination is requested today for follow-up.    TECHNIQUE:  CT imaging of the abdomen and pelvis with IV contrast. Radiation dose reduction techniques included automated exposure control. Radiation audit for CT and nuclear cardiology exams in the last 12 months: 0.    ABDOMEN FINDINGS:  Rim-enhancing pericapsular or subcapsular fluid collection along the posterior margin of the lower pole right kidney has enlarged since 10/18/2019. It previously measured about 5.7 x 3.9 cm axially and currently measures 6.0 x 4.8 cm in the same location  (see axial image 48). There is associated perinephric soft tissue stranding, most likely inflammatory. The findings are compatible with clinically suspected perinephric abscess.    Both kidneys enhance normally. There is no evidence of upper urinary tract obstruction.    Liver, pancreas and spleen are within normal limits. No bile duct or pancreatic duct dilatation.    Small bowel and colon are normal in caliber and appearance without GI contrast. Normal appendix. No gastric distention, hiatal hernia or distal esophageal dilatation.    PELVIS FINDINGS:  Urinary bladder, prostate and rectum are within normal limits.    Lung base images show a tiny right pleural effusion.      Impression:       1.  Likely perinephric or  subcapsular abscess along the posterior aspect of the lower pole right kidney. This has enlarged since 10/18/2019.  2.  Normal renal parenchymal enhancement. No CT evidence of pyelonephritis at this time. No evidence of upper urinary tract obstruction.  3.  Tiny right pleural effusion.    Signer Name: Fadi Healy MD   Signed: 10/26/2019 8:35 PM   Workstation Name: [email or web address]    [facility]            Impression:      --Acute Kleb septicemia/sepsis on treatment since last admit, likely urinary source/pyelnoephritis associated with urinary retention and  with abnormal CT scan with perinephric collection/abscess and Kleb on aspirate that has persisted as of aspirate 10/29 arguing relatively sequestered focus not penetrated by systemic abx and now with drain placed 10/29;  IV  Zosyn ongoing; urology to determine any timing/option or threshold for further intervention such as surgical debridement if not responsive to drain/abx, or other functional/anatomic assessment.  DRAIN INADVERTENTLY OUT 11/4;  Monitor clinically, likely to need repeat CT to evaluate in upcoming days;  D/w Dr Forebs     --Acute perinephric abscess as above;   Urology following to help guide timing/option/threshold for further drainage (perc -v- other);  Perc drain 10/29 and kleb persisted in culture    --urinary frequency with cutaneous candidiasis and candiduria/pyruria;  diflucan     --abnormal imaging with dry cough and probable pneumonia last admission;  At risk for aspiration with recent vomiting/dry heaves and empiric zosyn started 10/19; changed to Invanz 10/23 to help facilitate home therapy with mom and changed back to Zosyn October 28;   no productive cough at present, but if it develops, then send for culture     --acute  diarrhea 10/27-10/28 with Hx CDiff per him/family and recent empiric oral vancomycin, CDT negative and oral vancomycin stopped;  If recurrent diarrhea then consider more wrolup or  empiric therapy     --Hx perirectal abscess ; Colorectal surgery, Dr. Payne previously saw and is following.  Drainage as above on August 2 with mxed Fecal sherly in culture; CT scans  with no mention of abscess on 8/18 study;   any timing/option or threshold for further GI/colorectal work-up per Dr payne/JACKSON     --History urinary retention.  urology following     --Hx ARF in August 2019;  ?obstruction initially associated with retention postop (1200 out with I/O cath postop per nursing) -v- contrast from CT -v- lisinopril/medication/vancomycin -v- relative hypotension -v- likely combination of factors with ATN;  vancomycin trough high and vancomycin stopped empirically 8/3 although high trough potentially accumulation as a consequence of diminishing renal function associated with retention/contrast/pressure rather than cause.  Nonetheless, avoiding for now; likely further acute kidney injury at admission August 17 associated with dehydration/prerenal/ATN etiology     --History MRSA;  Not in current culture     --Diabetes mellitus type 2, uncontrolled.  He reports the reason for this is forgetfulness in past     --History [place] and chronic liver disease/cirrhosis  per past notes     --Left BKA     --Peripheral vascular disease     --medical noncompliance and inability to care for self at home.       PLAN:      --IV Zosyn; IV diflucan      --Check/review labs cultures and scans     --Discussed with microbiology     --History per nursing staff      --Discussed with family, partial history per them     --Highly complex set of issues with high risk for further serious morbidity and other serious sequela     --Colorectal surgery and urology have followed; urology following to determine timing/option or threshold for further percutaneous aspiration/drainage versus other surgery regarding  Abscess and to guide further workup for air in urine     --D/w Dr Andrei Dong MD  11/8/2019

## 2019-11-08 NOTE — PROGRESS NOTES
Adult Nutrition  Assessment/PES    Patient Name:  Kendrick Crystal  YOB: 1970  MRN: [id number]  Admit Date:  10/26/2019    Assessment Date:  11/8/2019        Reason for Assessment     Row Name 11/08/19 1521          Reason for Assessment    Reason For Assessment  follow-up protocol 25 mins     Diagnosis  -- per notes this adm.                   Nutrition Prescription Ordered     Row Name 11/08/19 1528 11/08/19 1523       Nutrition Prescription PO    Supplement  -- pt wife states pt likes supplement and has been drinking them.  -- premier pro    Supplement Frequency  --  2 times a day    Common Modifiers  --  Consistent Carbohydrate        Evaluation of Received Nutrient/Fluid Intake     Row Name 11/08/19 1524          PO Evaluation    Number of Meals  5     % PO Intake  40               Problem/Interventions:  Problem 1     Row Name 11/08/19 1525          Nutrition Diagnoses Problem 1    Problem 1  Inadequate Nutrient Intake     Etiology (related to)  MNT for Treatment/Condition     Signs/Symptoms (evidenced by)  PO Intake     Percent (%) intake recorded  40 %     Over number of meals  5               Intervention Goal     Row Name 11/08/19 1525          Intervention Goal    PO  Increase intake         Nutrition Intervention     Row Name 11/08/19 1527          Nutrition Intervention    RD/Tech Action  Follow Tx progress;Supplement provided           Education/Evaluation     Row Name 11/08/19 1520          Monitor/Evaluation    Monitor  Per protocol   RD spoke with  staff, in an effort to assure that pt receives po supplements as ordered.             Electronically signed by:  Carolyn Mcgarry MS,RD,LD  11/08/19 3:28 PM

## 2019-11-09 LAB
GLUCOSE BLDC GLUCOMTR-MCNC: 102 MG/DL (ref 70–130)
GLUCOSE BLDC GLUCOMTR-MCNC: 150 MG/DL (ref 70–130)
GLUCOSE BLDC GLUCOMTR-MCNC: 187 MG/DL (ref 70–130)
GLUCOSE BLDC GLUCOMTR-MCNC: 193 MG/DL (ref 70–130)

## 2019-11-09 PROCEDURE — 25010000002 FLUCONAZOLE PER 200 MG: Performed by: INTERNAL MEDICINE

## 2019-11-09 PROCEDURE — 97110 THERAPEUTIC EXERCISES: CPT

## 2019-11-09 PROCEDURE — 99232 SBSQ HOSP IP/OBS MODERATE 35: CPT | Performed by: FAMILY MEDICINE

## 2019-11-09 PROCEDURE — 63710000001 INSULIN LISPRO (HUMAN) PER 5 UNITS: Performed by: NURSE PRACTITIONER

## 2019-11-09 PROCEDURE — 82962 GLUCOSE BLOOD TEST: CPT

## 2019-11-09 PROCEDURE — 63710000001 INSULIN DETEMIR PER 5 UNITS: Performed by: INTERNAL MEDICINE

## 2019-11-09 PROCEDURE — 25010000002 PIPERACILLIN SOD-TAZOBACTAM PER 1 G: Performed by: INTERNAL MEDICINE

## 2019-11-09 PROCEDURE — 97164 PT RE-EVAL EST PLAN CARE: CPT

## 2019-11-09 PROCEDURE — 25010000002 HEPARIN (PORCINE) PER 1000 UNITS: Performed by: NURSE PRACTITIONER

## 2019-11-09 RX ADMIN — INSULIN LISPRO 2 UNITS: 100 INJECTION, SOLUTION INTRAVENOUS; SUBCUTANEOUS at 17:02

## 2019-11-09 RX ADMIN — TAZOBACTAM SODIUM AND PIPERACILLIN SODIUM 3.38 G: 375; 3 INJECTION, SOLUTION INTRAVENOUS at 03:33

## 2019-11-09 RX ADMIN — NYSTATIN: 100000 CREAM TOPICAL at 09:04

## 2019-11-09 RX ADMIN — TERAZOSIN HYDROCHLORIDE ANHYDROUS 5 MG: 5 CAPSULE ORAL at 21:35

## 2019-11-09 RX ADMIN — INSULIN DETEMIR 22 UNITS: 100 INJECTION, SOLUTION SUBCUTANEOUS at 08:55

## 2019-11-09 RX ADMIN — TAZOBACTAM SODIUM AND PIPERACILLIN SODIUM 3.38 G: 375; 3 INJECTION, SOLUTION INTRAVENOUS at 17:01

## 2019-11-09 RX ADMIN — HYDRALAZINE HYDROCHLORIDE 25 MG: 25 TABLET, FILM COATED ORAL at 17:01

## 2019-11-09 RX ADMIN — INSULIN LISPRO 2 UNITS: 100 INJECTION, SOLUTION INTRAVENOUS; SUBCUTANEOUS at 21:35

## 2019-11-09 RX ADMIN — Medication 250 MG: at 21:36

## 2019-11-09 RX ADMIN — HYDRALAZINE HYDROCHLORIDE 25 MG: 25 TABLET, FILM COATED ORAL at 21:35

## 2019-11-09 RX ADMIN — INSULIN DETEMIR 22 UNITS: 100 INJECTION, SOLUTION SUBCUTANEOUS at 21:42

## 2019-11-09 RX ADMIN — HEPARIN SODIUM 5000 UNITS: 5000 INJECTION INTRAVENOUS; SUBCUTANEOUS at 06:16

## 2019-11-09 RX ADMIN — INSULIN LISPRO 2 UNITS: 100 INJECTION, SOLUTION INTRAVENOUS; SUBCUTANEOUS at 09:01

## 2019-11-09 RX ADMIN — Medication 250 MG: at 09:00

## 2019-11-09 RX ADMIN — METOPROLOL TARTRATE 100 MG: 100 TABLET ORAL at 09:00

## 2019-11-09 RX ADMIN — TAZOBACTAM SODIUM AND PIPERACILLIN SODIUM 3.38 G: 375; 3 INJECTION, SOLUTION INTRAVENOUS at 09:05

## 2019-11-09 RX ADMIN — INSULIN LISPRO 5 UNITS: 100 INJECTION, SOLUTION INTRAVENOUS; SUBCUTANEOUS at 17:02

## 2019-11-09 RX ADMIN — HYDRALAZINE HYDROCHLORIDE 25 MG: 25 TABLET, FILM COATED ORAL at 09:10

## 2019-11-09 RX ADMIN — ASPIRIN 81 MG: 81 TABLET, COATED ORAL at 09:00

## 2019-11-09 RX ADMIN — METOPROLOL TARTRATE 100 MG: 100 TABLET ORAL at 21:35

## 2019-11-09 RX ADMIN — INSULIN LISPRO 5 UNITS: 100 INJECTION, SOLUTION INTRAVENOUS; SUBCUTANEOUS at 09:00

## 2019-11-09 RX ADMIN — SODIUM CHLORIDE, PRESERVATIVE FREE 10 ML: 5 INJECTION INTRAVENOUS at 21:36

## 2019-11-09 RX ADMIN — OXYCODONE HYDROCHLORIDE AND ACETAMINOPHEN 1 TABLET: 5; 325 TABLET ORAL at 21:35

## 2019-11-09 RX ADMIN — FLUCONAZOLE 200 MG: 200 INJECTION, SOLUTION INTRAVENOUS at 09:00

## 2019-11-09 RX ADMIN — AMLODIPINE BESYLATE 10 MG: 10 TABLET ORAL at 09:00

## 2019-11-09 RX ADMIN — SODIUM CHLORIDE, PRESERVATIVE FREE 10 ML: 5 INJECTION INTRAVENOUS at 09:05

## 2019-11-09 RX ADMIN — NYSTATIN: 100000 CREAM TOPICAL at 21:42

## 2019-11-09 RX ADMIN — HEPARIN SODIUM 5000 UNITS: 5000 INJECTION INTRAVENOUS; SUBCUTANEOUS at 21:36

## 2019-11-09 RX ADMIN — DULOXETINE HYDROCHLORIDE 60 MG: 60 CAPSULE, DELAYED RELEASE ORAL at 09:00

## 2019-11-09 NOTE — PROGRESS NOTES
[facility] Progress Note        Antibiotics:  Anti-Infectives (From admission, onward)    Ordered     Dose/Rate Route Frequency Start Stop    19 0848  fluconazole (DIFLUCAN) IVPB 200 mg     Ordering Provider:  Usman Dong MD    200 mg  100 mL/hr over 60 Minutes Intravenous Daily 19 0945 19 0859    10/30/19 0725  fluconazole (DIFLUCAN) IVPB 200 mg     Ordering Provider:  Usman Dong MD    200 mg  over 60 Minutes Intravenous Daily 10/30/19 0900 19 1028    10/28/19 0743  piperacillin-tazobactam (ZOSYN) 3.375 g in iso-osmotic dextrose 50 ml (premix)     Ordering Provider:  Usman Dong MD    3.375 g  over 4 Hours Intravenous Every 8 Hours 10/28/19 1700 19 1659    10/28/19 0743  piperacillin-tazobactam (ZOSYN) 3.375 g in iso-osmotic dextrose 50 ml (premix)     Ordering Provider:  Usman Dong MD    3.375 g  over 30 Minutes Intravenous Once 10/28/19 1100 10/28/19 1303    10/26/19 2040  meropenem (MERREM) 1 g/100 mL 0.9% NS VTB (mbp)     Ordering Provider:  Susan Infante APRN    1 g  over 30 Minutes Intravenous Once 10/26/19 2130 10/26/19 2241          CC: fatigue    HPI:    Patient is a 49 y.o.  Yr old male with history of poorly contolled T2DM, DUNLAP/liver cirrhosis, HTN, PVD/Left BKA, and multiple skin abscesses/MRSA who presented to [facility] ED with right shin wound infection summer 2018.  He was unable to get to see Dr. Martinez as his father passed away unexpectedly on 18 and he had to wait until after the .  The wound worsened becoming gangrenous and he came to [facility] ED in 2018.  He also had been hospitalized at [facility] and then transferred to  for a severe penis/scrotum infection requiring surgical debridement in summer 2018.  He received antibiotics regarding his right leg infection, generally improved over the remainder of summer 2018 but that area had not completely healed. He was admitted 2019 with acute left  lower abdominal wall redness/swelling and pain, spontaneous drainage associated with fever/chills and uncontrolled blood sugars.  4/26 I&D requiring wide debridement , severe/deep infection and transferred to [facility] on May 3 with IV Zosyn/oral doxycycline. Cultures had lactobacillus and mixed gram-positive skin sherly including alpha strep, staph epidermidis and corynebacterium. Readmitted to [facility] May 18, 2019, increasing generalized edema ultimately transitioned to oral doxycycline and healed.     He presented to the emergency room July 30, 2019 with acute rectal pain that had begun 7/27; this is associated with constipation for days, chills and nausea/dry heaving.  White blood cell count elevated, high hemoglobin A1c over 13, urinalysis with hematuria/pyuria and CT scan with 3 x 3 cm fluid collection anterior to the distal rectum and per discussion with Dr. Akbar/Jennifer, this is not in the prostate.  Colorectal surgery/urology saw him for consideration of surgical options/timing/threshold, etc..     8/2/19 I&Dper Dr Payne perirectal abscess; patient reports that he had instrumented his rectum prior to hospitalization with enema     8/3/19 urinary retention overnight requiring in/out catheterization per patient.  Creat climb     8/4/19 further creat climb; childs placed and lisinopril stopped and d/w Dr Rubio;  ?obstruction initially associated with retention postop (1200 out with I/O cath postop per nursing) -v- contrast from CT -v- lisinopril/medication/vancomycin -v- relative hypotension -v- likely combination of factors with ATN; nephrology following; vancomycin trough high and vancomycin stopped empirically 8/3 although high trough potentially accumulation as a consequence of diminishing renal function associated with retention/contrast/pressure rather than cause.  Nonetheless, avoid for now; creatinine trending down.        8/7 in retrospect he remembers air in his urine at admit which  "has raised concern for possible fistula from urinary tract;  No fecaluria and culture from abscess is fecal sherly;  ?rectal-urethral fistula;  Dr Payne aware     Culture with E. coli/Klebsiella species and creatinine generally trended down, transitioned to oral antibiotics at discharge.     Readmitted August 17, 2019 with nausea/vomiting/dry heaves for 3 days.  Walker catheter was placed and CT scan of the abdomen showed:      \"Previously seen fluid collection identified inferior to the prostate gland is more increased in density on today's examination. There is wall thickening again seen throughout the bladder. Findings again are concerning for cystitis.\" per radiology     He was transitioned to oral omnicef/topical antifungal on approx 8/23/19; Noncompliant with f/u in our office     READMITTED 10/8/19 RLQ abd pain, urinary retention, nausea, dysuria and generalized fatigue     UTI by U/A, subsequent blood cultures positive for  kleb sp, urine with kleb and e coli ;  Abnormal CT abd with right perinephric fluid collection, advanced cirrhosis, urinary bladder thickening.  10/9 Aspirate of perinephric fluid collection consistent with abscess/kleb and white blood cells; 10/16/19 cytoscopy with bullous change per Dr Gutierres but no fistula per him; 10/19/19 intermittent nausea, no vomiting or dry heaves this am, intermittent dry cough broadened to zosyn with ?aspiration pneumonia evolving after dry heaves/vomiting and had intermittent diarrhea, oral vancomycin added empirically with history of prior C. Difficile; improved with IV Zosyn/oral vancomycin and he refused consideration of placement.  He insisted on home, discharged October 23 and noncompliant with outpatient therapy and outpatient follow-up October 25.  He had become fatigued such that his family could not assist him out of bed and it was recommended by my office that he return to the emergency room October 25.  He apparently refused that but did come to the " emergency room October 26.     Readmitted October 26, 2019 with generalized weakness, fatigue/malaise and nausea/vomiting/diarrhea.  CT scan revealed increased size of perinephric fluid collection per radiology.     On October 28, patient had reported increased diarrhea, at least 6 episodes overnight and watery/thin but no hematochezia melena or hematemesis.  Vomiting has subsided     10/29 drain placed    11/5/19 drain inadvertently out overnight per him    11/8 voiding cystourethrogram per urology; ultrasound abdomen with no mention of abscess per radiology    11/9/19 doing ok per him;  GI habits variable, but no vomiting today; stool consistency varies and not always diarrhea per family; vague abdominal discomfort that he describes as dull, intermittent, nonradiating, worse with palpation, generally better with pain meds and 2-3 out of 10.  No dysuria hematuria or pyuria.  Denies new hesitancy urgency or flank pain.      No headache photophobia or neck stiffness.  No shortness of breath cough or hemoptysis.  No fevers chills or sweats.  No rash.  No other particular exposures     ROS:      11/9/19 No f/c/s. No vomiting. No rash. No new ADR to Abx.     Constitutional-- No Fever, chills or sweats.  Appetite diminished with generalized malaise and fatigue  Heent-- No new vision, hearing or throat complaints.  No epistaxis or oral sores.  Denies odynophagia or dysphagia.  No flashers, floaters or eye pain. No odynophagia or dysphagia. No headache, photophobia or neck stiffness.  CV-- No chest pain, palpitation or syncope  Resp-- No SOB/cough/Hemoptysis  GI-as above.  No hematochezia, melena, or hematemesis. Denies jaundice or chronic liver disease.  -- No dysuria, hematuria, or flank pain.  Denies hesitancy or flank pain.  Lymph- no swollen lymph nodes in neck/axilla or groin.   Heme- No active bruising or bleeding; no Hx of DVT or PE.  MS-- no swelling or pain in the bones or joints of arms/legs.  No new back  "pain.  Neuro-- No acute focal weakness or numbness in the arms or legs.  No seizures.     Full 12 point review of systems reviewed and negative otherwise for acute complaints, except for above      PE:   /68 (BP Location: Left arm, Patient Position: Lying)   Pulse 77   Temp 97.5 °F (36.4 °C) (Oral)   Resp 18   Ht 190.5 cm (75\")   Wt 122 kg (269 lb 9.6 oz)   SpO2 95%   BMI 33.70 kg/m²     GENERAL: Awake and alert, in no acute distress.   HEENT: Normocephalic, atraumatic.  PERRL. EOMI. No conjunctival injection. No icterus. Oropharynx clear without evidence of thrush or exudate. No evidence of peridontal disease.    NECK: Supple without nuchal rigidity. No mass.  LYMPH: No cervical, axillary or inguinal lymphadenopathy.  HEART: RRR; No murmur, rubs, gallops.   LUNGS: Diminished at bases to auscultation bilaterally with slight rhonchi bilateral but no wheezing or rales. Normal respiratory effort. Nonlabored. No dullness.  ABDOMEN: Soft, nontender, nondistended. Positive bowel sounds. No rebound or guarding. NO mass or HSM.  EXT:  No cyanosis, clubbing or edema. No cord.  : vague erythema/sattelite lesions c/w cutaneous candidiasis  MSK: FROM without joint effusions noted arms/legs.    SKIN: Warm and dry without cutaneous eruptions on Inspection/palpation.    NEURO: Oriented to PPT. No focal deficits on motor/sensory exam at arms/legs.     No peripheral stigmata/phenomena of endocarditis     PICC line without obvious redness or drainage     Laboratory Data    Results from last 7 days   Lab Units 11/07/19  0621 11/06/19  0813 11/05/19  0457   WBC 10*3/mm3 11.73* 11.43* 12.11*   HEMOGLOBIN g/dL 9.6* 9.0* 9.5*   HEMATOCRIT % 31.3* 29.5* 30.9*   PLATELETS 10*3/mm3 386 373 402     Results from last 7 days   Lab Units 11/07/19  0634   SODIUM mmol/L 136   POTASSIUM mmol/L 3.8   CHLORIDE mmol/L 98   CO2 mmol/L 25.0   BUN mg/dL 10   CREATININE mg/dL 0.62*   GLUCOSE mg/dL 186*   CALCIUM mg/dL 8.5*     Results from " last 7 days   Lab Units 11/07/19  0634   ALK PHOS U/L 224*   BILIRUBIN mg/dL <0.2*   ALT (SGPT) U/L 21   AST (SGOT) U/L 24               Estimated Creatinine Clearance: 198.4 mL/min (A) (by C-G formula based on SCr of 0.62 mg/dL (L)).      Microbiology:      Radiology:  Imaging Results (last 72 hours)     Procedure Component Value Units Date/Time    CT Abdomen Pelvis With Contrast [[id number]] Collected:  10/26/19 2035     Updated:  10/26/19 2037    Narrative:       CT ABDOMEN AND PELVIS WITH CONTRAST, 10/26/2019    HISTORY:  49-year-old male with recent history of a right perinephric fluid collection that underwent diagnostic needle aspiration 10/9/2019. He has been on IV antibiotic therapy for possible perinephric abscess. Examination is requested today for follow-up.    TECHNIQUE:  CT imaging of the abdomen and pelvis with IV contrast. Radiation dose reduction techniques included automated exposure control. Radiation audit for CT and nuclear cardiology exams in the last 12 months: 0.    ABDOMEN FINDINGS:  Rim-enhancing pericapsular or subcapsular fluid collection along the posterior margin of the lower pole right kidney has enlarged since 10/18/2019. It previously measured about 5.7 x 3.9 cm axially and currently measures 6.0 x 4.8 cm in the same location  (see axial image 48). There is associated perinephric soft tissue stranding, most likely inflammatory. The findings are compatible with clinically suspected perinephric abscess.    Both kidneys enhance normally. There is no evidence of upper urinary tract obstruction.    Liver, pancreas and spleen are within normal limits. No bile duct or pancreatic duct dilatation.    Small bowel and colon are normal in caliber and appearance without GI contrast. Normal appendix. No gastric distention, hiatal hernia or distal esophageal dilatation.    PELVIS FINDINGS:  Urinary bladder, prostate and rectum are within normal limits.    Lung base images show a tiny right pleural  effusion.      Impression:       1.  Likely perinephric or subcapsular abscess along the posterior aspect of the lower pole right kidney. This has enlarged since 10/18/2019.  2.  Normal renal parenchymal enhancement. No CT evidence of pyelonephritis at this time. No evidence of upper urinary tract obstruction.  3.  Tiny right pleural effusion.    Signer Name: Fadi Healy MD   Signed: 10/26/2019 8:35 PM   Workstation Name: [email or web address]    [facility]            Impression:      --Acute Kleb septicemia/sepsis on treatment since last admit, likely urinary source/pyelnoephritis associated with urinary retention and  with abnormal CT scan with perinephric collection/abscess and Kleb on aspirate that has persisted as of aspirate 10/29 arguing relatively sequestered focus not penetrated by systemic abx and now with drain placed 10/29;  IV  Zosyn ongoing; urology to determine any timing/option or threshold for further intervention such as surgical debridement if not responsive to drain/abx, or other functional/anatomic assessment.  DRAIN INADVERTENTLY OUT 11/4;  Monitor clinically,   D/w Dr Forbes; ultrasound done on November 8 with no mention of abscess by radiology.  Need radiology clarification as to whether this modality is good enough in comparison to prior CT scans to know whether abscess is better/gone versus need for different modality such as repeat CT scan to better clarify.     --Acute perinephric abscess as above;   Urology following to help guide timing/option/threshold for further drainage (perc -v- other);  Perc drain 10/29 and kleb persisted in culture; see above regarding repeat/recent scans    --urinary frequency with cutaneous candidiasis and candiduria/pyruria;  S/p diflucan     --abnormal imaging with dry cough and probable pneumonia last admission;  At risk for aspiration with recent vomiting/dry heaves and empiric zosyn started 10/19; changed to Invanz 10/23 to help  facilitate home therapy with mom and changed back to Zosyn October 28;   no productive cough at present, but if it develops, then send for culture     --acute  diarrhea 10/27-10/28 with Hx CDiff per him/family and recent empiric oral vancomycin, CDT negative and oral vancomycin stopped;  If recurrent diarrhea then consider more wrolup or empiric therapy     --Hx perirectal abscess ; Colorectal surgery, Dr. Payne previously saw and is following.  Drainage as above on August 2 with mxed Fecal sherly in culture; CT scans  with no mention of abscess on 8/18 study;   any timing/option or threshold for further GI/colorectal work-up per Dr payne/CRS     --History urinary retention.  urology following     --Hx ARF in August 2019;  ?obstruction initially associated with retention postop (1200 out with I/O cath postop per nursing) -v- contrast from CT -v- lisinopril/medication/vancomycin -v- relative hypotension -v- likely combination of factors with ATN;  vancomycin trough high and vancomycin stopped empirically 8/3 although high trough potentially accumulation as a consequence of diminishing renal function associated with retention/contrast/pressure rather than cause.  Nonetheless, avoiding for now; likely further acute kidney injury at admission August 17 associated with dehydration/prerenal/ATN etiology     --History MRSA;  Not in current culture     --Diabetes mellitus type 2, uncontrolled.  He reports the reason for this is forgetfulness in past     --History [place] and chronic liver disease/cirrhosis  per past notes     --Left BKA     --Peripheral vascular disease     --medical noncompliance and inability to care for self at home.       PLAN:      --IV Zosyn; IV diflucan      --Check/review labs cultures and scans     --Discussed with microbiology     --History per nursing staff      --Discussed with family, partial history per them     --Highly complex set of issues with high risk for further serious morbidity and other  serious sequela     --Colorectal surgery and urology have followed; urology following to determine timing/option or threshold for further percutaneous aspiration/drainage versus other surgery regarding  Abscess and to guide further workup for air in urine     --D/w Dr Andrei Dong MD  11/9/2019

## 2019-11-09 NOTE — THERAPY RE-EVALUATION
Patient Name: Kendrick Crystal  : 1970    MRN: [id number]                              Today's Date: 2019       Admit Date: 10/26/2019    Visit Dx:     ICD-10-CM ICD-9-CM   1. Perinephric abscess N15.1 590.2   2. History of sepsis Z86.19 V12.09   3. History of bacteremia Z87.898 V12.09   4. DM (diabetes mellitus) type II uncontrolled, periph vascular disorder (CMS/[facility]) E11.51 250.72    E11.65 443.81   5. Essential hypertension I10 401.9   6. S/P BKA (below knee amputation), left (CMS/HCC) Z89.512 V49.75   7. Impaired mobility and ADLs Z74.09 799.89     Patient Active Problem List   Diagnosis   • DUNLAP Cirrhosis   • Essential hypertension   • Non-compliance   • Hyperlipemia   • Hypertriglyceridemia, essential   • Sepsis affecting skin   • Type 2 diabetes mellitus with circulatory disorder and uncontrolled   • History of MRSA infection   • Diabetic ulcer of right lower leg (CMS/[facility])   • S/P BKA (below knee amputation), left (CMS/HCC)   • Peripheral vascular disease (CMS/[facility])   • DM (diabetes mellitus) type II uncontrolled, periph vascular disorder (CMS/[facility])   • Obesity (BMI 30-39.9)   • Gastroparesis   • Perinephric abscess   • Acute UTI (urinary tract infection)   • Bacteremia due to Klebsiella pneumoniae   • Pneumaturia     Past Medical History:   Diagnosis Date   • Abdominal wall cellulitis 2019   • JUAN (acute kidney injury) (CMS/[facility]) 2018   • Arthritis    • Bipolar 1 disorder (CMS/[facility])    • Cellulitis of right anterior lower leg 2018    WITH MRSA   • Cirrhosis (CMS/[facility])    • Counseling for insulin pump    • Depression    • Diabetes mellitus (CMS/[facility])    • Encephalopathy, hepatic (CMS/[facility])    • H/O degenerative disc disease    • History of Lissa's gangrene     right leg/testicle   • Hyperlipemia    • Hypertension    • multiple Skin abscesses    • DUNLAP, bx showed stage IV fibrosis    • Neuropathy    • Peripheral vascular disease (CMS/[facility])    • Thrombophlebitis      Past Surgical History:    Procedure Laterality Date   • ABDOMINAL WALL ABSCESS INCISION AND DRAINAGE N/A 4/26/2019    Procedure: ABDOMINAL WALL DEBRIDEMENT;  Surgeon: Fadi Kern MD;  Location:  [facility];  Service: General   • AMPUTATION DIGIT Left 1/30/2017    Procedure: left fourth and fifth transmetatarsal toe amputation ;  Surgeon: Juancho Martinez MD;  Location:  [facility];  Service:    • BELOW KNEE AMPUTATION Left 3/2/2017    Procedure: AMPUTATION BELOW KNEE, SHMUEL;  Surgeon: Juancho Martinez MD;  Location:  [facility];  Service:    • CYSTOSCOPY N/A 10/16/2019    Procedure: CYSTOSCOPY;  Surgeon: Brad Gutierres MD;  Location:  [facility];  Service: Urology   • ENDOSCOPY     • HEMORRHOIDECTOMY N/A 8/2/2019    Procedure: EXCISION AND DRAIN PERIRECTAL ABSCESS;  Surgeon: Mumtaz Payne MD;  Location:  [facility];  Service: General   • LEG SURGERY     • TESTICLE SURGERY Right     DEBRIDEMENT FROM GANGRENE   • TONSILLECTOMY  1975     General Information     Row Name 11/09/19 1047          PT Evaluation Time/Intention    Document Type  re-evaluation  -SR     Mode of Treatment  physical therapy  -SR     Row Name 11/09/19 1047          General Information    Patient Profile Reviewed?  yes  -SR     Prior Level of Function  min assist:;ADL's;all household mobility  -SR     Existing Precautions/Restrictions  fall  -SR     Barriers to Rehab  uncooperative;medically complex;previous functional deficit  -SR     Row Name 11/09/19 1047          Relationship/Environment    Lives With  spouse  -SR     Row Name 11/09/19 1047          Resource/Environmental Concerns    Current Living Arrangements  home/apartment/condo  -SR     Row Name 11/09/19 1047          Home Main Entrance    Number of Stairs, Main Entrance  none  -SR     Row Name 11/09/19 1047          Stairs Within Home, Primary    Number of Stairs, Within Home, Primary  none  -SR     Row Name 11/09/19 1047          Cognitive Assessment/Intervention- PT/OT    Orientation Status (Cognition)   oriented x 3  -SR     Row Name 11/09/19 1047          Safety Issues, Functional Mobility    Safety Issues Affecting Function (Mobility)  ability to follow commands;awareness of need for assistance;insight into deficits/self awareness;judgment  -SR     Impairments Affecting Function (Mobility)  endurance/activity tolerance;strength;balance  -SR       User Key  (r) = Recorded By, (t) = Taken By, (c) = Cosigned By    Initials Name Provider Type    SR Angela Alicea, PT Physical Therapist        Mobility     Row Name 11/09/19 1049          Bed Mobility Assessment/Treatment    Bed Mobility Assessment/Treatment  supine-sit;sit-supine;scooting/bridging  -SR     Scooting/Bridging [place] (Bed Mobility)  dependent (less than 25% patient effort);2 person assist  -SR     Supine-Sit [place] (Bed Mobility)  moderate assist (50% patient effort)  -SR     Sit-Supine [place] (Bed Mobility)  contact guard  -SR     Comment (Bed Mobility)  pt will not attempt to A with bed mobility despite encouragement and education, PT educates pt's spouse with scooting mechanics using draw sheet  -SR     Row Name 11/09/19 1049          Transfer Assessment/Treatment    Comment (Transfers)  pt refuses to attempt standing EOB with PT despite max encouragement and education, pt also refuses to use mech. lift to transfer to recliner, CM notified  -SR     Row Name 11/09/19 1049          Gait/Stairs Assessment/Training    Comment (Gait/Stairs)  not assessed, pt refuses to attempt standing  -SR       User Key  (r) = Recorded By, (t) = Taken By, (c) = Cosigned By    Initials Name Provider Type    SR Angela Alicea, PT Physical Therapist        Obj/Interventions     Row Name 11/09/19 1053          General ROM    GENERAL ROM COMMENTS  RLE AROM WFL, L hip AROM WFL, lacking about 10 degrees knee extension (BKA)  -SR     Row Name 11/09/19 1053          MMT (Manual Muscle Testing)    General MMT Comments  R hip grossly 3+/5, R knee grossly 4/5, L  hip grossly 4/5, L knee not tested (BKA)  -SR     Row Name 11/09/19 1053          Therapeutic Exercise    Upper Extremity (Therapeutic Exercise)  bicep curl, bilateral;tricep extension, bilateral  -SR     Upper Extremity Range of Motion (Therapeutic Exercise)  shoulder flexion/extension, bilateral;shoulder horizontal abduction/adduction, bilateral  -SR     Lower Extremity (Therapeutic Exercise)  marching while seated;LAQ (long arc quad), bilateral  -SR     Lower Extremity Range of Motion (Therapeutic Exercise)  hip abduction/adduction, bilateral;knee flexion/extension, right  -SR     Weight/Resistance (Therapeutic Exercise)  blue  -SR     Exercise Type (Therapeutic Exercise)  AROM (active range of motion)  -SR     Sets/Reps (Therapeutic Exercise)  5-10 reps each  -SR     Expected Outcome (Therapeutic Exercise)  improve functional stability;improve functional tolerance, household activity  -SR     Row Name 11/09/19 1053          Static Sitting Balance    Sitting Position (Unsupported Sitting, Static Balance)  sitting on edge of bed  -SR     Time Able to Maintain Position (Unsupported Sitting, Static Balance)  more than 5 minutes  -SR       User Key  (r) = Recorded By, (t) = Taken By, (c) = Cosigned By    Initials Name Provider Type    SR Angela Alicea, PT Physical Therapist        Goals/Plan     Row Name 11/09/19 1057          Bed Mobility Goal 1 (PT)    Activity/Assistive Device (Bed Mobility Goal 1, PT)  bed mobility activities, all  -SR     [place] Level/Cues Needed (Bed Mobility Goal 1, PT)  conditional independence  -SR     Time Frame (Bed Mobility Goal 1, PT)  short term goal (STG);5 days  -SR     Progress/Outcomes (Bed Mobility Goal 1, PT)  goal ongoing  -SR     Row Name 11/09/19 1057          Transfer Goal 1 (PT)    Activity/Assistive Device (Transfer Goal 1, PT)  sit-to-stand/stand-to-sit;walker, standard  -SR     [place] Level/Cues Needed (Transfer Goal 1, PT)  moderate assist (50-74% patient  effort)  -SR     Time Frame (Transfer Goal 1, PT)  long term goal (LTG);10 days  -SR       User Key  (r) = Recorded By, (t) = Taken By, (c) = Cosigned By    Initials Name Provider Type    SR Angela Alicea, PT Physical Therapist        Clinical Impression     Row Name 11/09/19 1055          Pain Scale: Numbers Pre/Post-Treatment    Pain Scale: Numbers, Pretreatment  0/10 - no pain  -SR     Pain Scale: Numbers, Post-Treatment  0/10 - no pain  -SR     Pre/Post Treatment Pain Comment  pt denies pain  -SR     Pain Intervention(s)  Repositioned;Ambulation/increased activity  -SR     Row Name 11/09/19 1055          Plan of Care Review    Plan of Care Reviewed With  patient;spouse  -SR     Row Name 11/09/19 1055          Physical Therapy Clinical Impression    Patient/Family Goals Statement (PT Clinical Impression)  to go to [facility]   -SR     Criteria for Skilled Interventions Met (PT Clinical Impression)  yes;treatment indicated  -SR     Rehab Potential (PT Clinical Summary)  fair, will monitor progress closely  -SR     Row Name 11/09/19 1055          Vital Signs    Pre Systolic BP Rehab  -- VSS, RN cleared for tx  -SR     Row Name 11/09/19 1055          Positioning and Restraints    Pre-Treatment Position  in bed  -SR     Post Treatment Position  bed  -SR     In Bed  with nsg;with family/caregiver;encouraged to call for assist;call light within reach;exit alarm on  -SR       User Key  (r) = Recorded By, (t) = Taken By, (c) = Cosigned By    Initials Name Provider Type    Angela Crockett, PT Physical Therapist        Outcome Measures     Row Name 11/09/19 1104          How much help from another person do you currently need...    Turning from your back to your side while in flat bed without using bedrails?  4  -SR     Moving from lying on back to sitting on the side of a flat bed without bedrails?  2  -SR     Moving to and from a bed to a chair (including a wheelchair)?  1  -SR     Standing up from a chair using  your arms (e.g., wheelchair, bedside chair)?  1  -SR     Climbing 3-5 steps with a railing?  1  -SR     To walk in hospital room?  1  -SR     AM-PAC 6 Clicks Score (PT)  10  -SR     Row Name 11/09/19 1104          Functional Assessment    Outcome Measure Options  AM-PAC 6 Clicks Basic Mobility (PT)  -SR       User Key  (r) = Recorded By, (t) = Taken By, (c) = Cosigned By    Initials Name Provider Type    Angela Crockett, PT Physical Therapist        Physical Therapy Education     Title: PT OT SLP Therapies (In Progress)     Topic: Physical Therapy (In Progress)     Point: Mobility training (In Progress)     Learning Progress Summary           Patient Acceptance, E,TB, NR by SR at 11/9/2019 10:58 AM    Acceptance, E,D, NR,VU by GALO at 10/28/2019  3:32 PM    Comment:  Pt educated on the importance of continued mobility in order to reach goal of walking and improving overall function. Reviewed exercises and progression with POC.    Acceptance, E,TB, NR by SR at 10/27/2019  4:15 PM    Acceptance, E,TB, VU by SR at 10/27/2019  4:02 PM   Significant Other Acceptance, E,TB, NR by SR at 11/9/2019 10:58 AM    Acceptance, E,TB, NR by SR at 10/27/2019  4:15 PM                   Point: Home exercise program (In Progress)     Learning Progress Summary           Patient Acceptance, E,TB, NR by SR at 11/9/2019 10:58 AM    Acceptance, E,D, NR,VU by GALO at 10/28/2019  3:32 PM    Comment:  Pt educated on the importance of continued mobility in order to reach goal of walking and improving overall function. Reviewed exercises and progression with POC.    Acceptance, E,TB, NR by SR at 10/27/2019  4:15 PM   Significant Other Acceptance, E,TB, NR by SR at 11/9/2019 10:58 AM    Acceptance, E,TB, NR by SR at 10/27/2019  4:15 PM                   Point: Body mechanics (In Progress)     Learning Progress Summary           Patient Acceptance, E,TB, NR by SR at 11/9/2019 10:58 AM    Acceptance, E,D, NR,VU by GALO at 10/28/2019  3:32 PM     Comment:  Pt educated on the importance of continued mobility in order to reach goal of walking and improving overall function. Reviewed exercises and progression with POC.    Acceptance, E,TB, NR by SR at 10/27/2019  4:15 PM    Acceptance, E,TB, VU by SR at 10/27/2019  4:02 PM   Significant Other Acceptance, E,TB, NR by SR at 11/9/2019 10:58 AM    Acceptance, E,TB, NR by SR at 10/27/2019  4:15 PM                   Point: Precautions (In Progress)     Learning Progress Summary           Patient Acceptance, E,TB, NR by SR at 11/9/2019 10:58 AM    Acceptance, E,D, NR,VU by GALO at 10/28/2019  3:32 PM    Comment:  Pt educated on the importance of continued mobility in order to reach goal of walking and improving overall function. Reviewed exercises and progression with POC.    Acceptance, E,TB, NR by SR at 10/27/2019  4:15 PM    Acceptance, E,TB, VU by SR at 10/27/2019  4:02 PM   Significant Other Acceptance, E,TB, NR by SR at 11/9/2019 10:58 AM    Acceptance, E,TB, NR by SR at 10/27/2019  4:15 PM                               User Key     Initials Effective Dates Name Provider Type Discipline    SR 06/19/15 -  Angela Alicea, PT Physical Therapist PT    GALO 09/10/19 -  Francisco Javier Cameron, PT Physical Therapist PT              PT Recommendation and Plan  Planned Therapy Interventions (PT Eval): balance training, bed mobility training, home exercise program, patient/family education, strengthening, transfer training  Outcome Summary/Treatment Plan (PT)  Anticipated Equipment Needs at Discharge (PT): (pt already has rollator and st cane at home)  Anticipated Discharge Disposition (PT): skilled nursing facility  Plan of Care Reviewed With: patient, spouse  Progress: no change  Outcome Summary: PT re-eval completed. Pt agrees to EOB seated BUE and BLE ther ex only. He declines to attempt transfer training despite max encouragemeng and education, CM notified. Pt denies pain throughout, VSS. Cont with IPPT 3x/week and recommend SNF  upon dc. Consider increasing freq of PT once pt's participation improves.      Time Calculation:   PT Charges     Row Name 11/09/19 1104             Time Calculation    Start Time  0750  -SR      PT Received On  11/09/19  -SR      PT Goal Re-Cert Due Date  11/19/19  -SR         Time Calculation- PT    Total Timed Code Minutes- PT  10 minute(s)  -SR        User Key  (r) = Recorded By, (t) = Taken By, (c) = Cosigned By    Initials Name Provider Type    SR Angela Alicea, PT Physical Therapist        Therapy Charges for Today     Code Description Service Date Service Provider Modifiers Qty    56939275539 HC PT RE-EVAL ESTABLISHED PLAN 2 11/9/2019 Angela Alicea, PT GP 1    22835029162 HC PT THER PROC EA 15 MIN 11/9/2019 Angela Alicea, PT GP 1          PT G-Codes  Outcome Measure Options: AM-PAC 6 Clicks Basic Mobility (PT)  AM-PAC 6 Clicks Score (PT): 10  AM-PAC 6 Clicks Score (OT): 16    Angela Alicea PT  11/9/2019

## 2019-11-09 NOTE — PROGRESS NOTES
"Daily Progress Note    Patient: Kendrick MADRID [facility] Day: 14    Subjective: Pt without complaints.      Objective:     /68 (BP Location: Left arm, Patient Position: Lying)   Pulse 77   Temp 97.5 °F (36.4 °C) (Oral)   Resp 18   Ht 190.5 cm (75\")   Wt 122 kg (269 lb 9.6 oz)   SpO2 95%   BMI 33.70 kg/m²       Intake/Output Summary (Last 24 hours) at 11/9/2019 0944  Last data filed at 11/9/2019 0737  Gross per 24 hour   Intake --   Output 300 ml   Net -300 ml       Review of Systems:    Physical Exam:   General Appearance: alert, appears stated age and cooperative  Head: normocephalic, without obvious abnormality and atraumatic  Lungs respirations regular  Abdomen distended        Lab Results (last 24 hours)     Procedure Component Value Units Date/Time    POC Glucose Once [[id number]]  (Abnormal) Collected:  11/09/19 0737    Specimen:  Blood Updated:  11/09/19 0739     Glucose 187 mg/dL     POC Glucose Once [[id number]]  (Normal) Collected:  11/08/19 1847    Specimen:  Blood Updated:  11/08/19 1849     Glucose 123 mg/dL     POC Glucose Once [[id number]]  (Abnormal) Collected:  11/08/19 1643    Specimen:  Blood Updated:  11/08/19 1644     Glucose 161 mg/dL     POC Glucose Once [[id number]]  (Normal) Collected:  11/08/19 1131    Specimen:  Blood Updated:  11/08/19 1138     Glucose 119 mg/dL           Imaging Results (Last 24 Hours)     Procedure Component Value Units Date/Time    FL Voiding Urethrocystogram [[id number]] Collected:  11/08/19 1632     Updated:  11/08/19 1632    Narrative:       EXAMINATION: FL VOIDING URETHROCYSTOGRAM-     INDICATION: pneumaturia in pt with previous claudia-scrotal abscess.   Suspect urethral source.  cysto neg.; N15.1-Renal and perinephric  abscess; Z86.19-Personal history of other infectious and parasitic  diseases; Z87.898-Personal history of other specified conditions;  E11.51-Type 2 diabetes mellitus with diabetic peripheral angiopathy  without gangrene; E11.65-Type 2 " diabetes mellitus with hyperglycemia;  I10-Ess     TECHNIQUE: 2 minutes and 12 seconds of fluoroscopic time was used for  this exam. 5 associated images were saved.  imaging reveals a  nonobstructive bowel gas pattern. The patient arrived at the fluoroscopy  suite with a Walker catheter in situ. A single contrast study using  Cysto-Conray contrast media was performed. The bladder was filled in a  retrograde fashion.     COMPARISON: NONE     FINDINGS: Contrast was seen filling the bladder. The bladder mucosa  appeared grossly normal. No fixed filling defects were seen.  Vesicoureteral reflux was not noted during this exam. No extravasation  of contrast was seen. The urethra appeared within normal limits on  voiding images. No strictures, or mucosal irregularities of the urethra  were seen.          Impression:       Fluoroscopic guided voiding cystourethrogram series appeared  within normal limits.           US Abdomen Complete [[id number]] Collected:  11/08/19 0749     Updated:  11/08/19 1000    Narrative:       EXAMINATION: US ABDOMEN COMPLETE-     INDICATION: Abdominal pain and distention, history of ascites, DUNLAP  cirrhosis; N15.1-Renal and perinephric abscess; Z86.19-Personal history  of other infectious and parasitic diseases; Z87.898-Personal history of  other specified conditions; E11.51-Type 2 diabetes mellitus with  diabetic peripheral angiopathy without gangrene; E11.65-Type 2 diabetes  mellitus with hyperglycemia; I10-Essential (primary) hypertension.      TECHNIQUE: Ultrasound abdomen complete.     COMPARISON: None.     FINDINGS: Visualized portions of the aorta and IVC are grossly  unremarkable.     Visualized portions of the pancreas within normal limits.     Liver demonstrates coarsened echotexture without focal parenchymal  lesion.     Gallbladder is present without cholelithiasis, gallbladder wall  thickening or pericholecystic fluid demonstrating minimal debris in the  dependent portion.      Common bile duct measures 2 mm in diameter at the level of the mimi  hepatis.     Right kidney measures 11 cm in length without evidence of  hydronephrosis, contour deforming mass or obvious calculi.     Left kidney measures 12 cm in length without evidence of hydronephrosis,  contour deforming mass or obvious calculi.     Spleen measures 15 cm enlarged in size with a single 1.4 cm hypoechoic  area of likely simple cyst associated.     No ascites.       Impression:       1. Mildly coarsened echotexture of the hepatic parenchyma may represent  hepatic parenchymal process. No focal liver lesion.  2. Splenomegaly measuring up to 15 cm in craniocaudal dimension.  3. No ascites.      D:  11/08/2019  E:  11/08/2019             Assessment/Plan: Pneumaturia.  VCUG yesterday without evidence of fistula.  Renal abscess.  Clinically asymptomatic.WBC 12 k. Urine cx only with yeast.         Patient Active Problem List   Diagnosis   • DUNLAP Cirrhosis   • Essential hypertension   • Non-compliance   • Hyperlipemia   • Hypertriglyceridemia, essential   • Sepsis affecting skin   • Type 2 diabetes mellitus with circulatory disorder and uncontrolled   • History of MRSA infection   • Diabetic ulcer of right lower leg (CMS/HCC)   • S/P BKA (below knee amputation), left (CMS/HCC)   • Peripheral vascular disease (CMS/HCC)   • DM (diabetes mellitus) type II uncontrolled, periph vascular disorder (CMS/HCC)   • Obesity (BMI 30-39.9)   • Gastroparesis   • Perinephric abscess   • Acute UTI (urinary tract infection)   • Bacteremia due to Klebsiella pneumoniae   • Pneumaturia        Diagnosis Plan   1. Perinephric abscess     2. History of sepsis     3. History of bacteremia     4. DM (diabetes mellitus) type II uncontrolled, periph vascular disorder (CMS/HCC)     5. Essential hypertension     6. S/P BKA (below knee amputation), left (CMS/HCC)     7. Impaired mobility and ADLs           Brad Gutierres MD - 11/9/2019, 9:44 AM

## 2019-11-09 NOTE — PROGRESS NOTES
[facility] Medicine Services  PROGRESS NOTE    Patient Name: Kendrick Crystal  : 1970  MRN: [id number]    Date of Admission: 10/26/2019  Primary Care Physician: Provider, No Known    Subjective   Subjective     CC:  Infection    HPI:  Patient is a 49-year-old with complicated hospital course admitted for recurrent Klebsiella bacteremia with perinephric abscess, status post perinephric drain (inadvertently removed on 2019).     - Patient states he feels fine as of right now.  His drain was accidentally dislodged yesterday.  I discussed his case with Dr. Dong and agreed and close monitoring with repeat CT scan after 24 hours or sooner if he gets sick.  Patient is tolerating p.o. well.  He denies nausea vomiting or constipation.  He is ambulating some.  He denies new concerns.     -patient denies new complaints.  He has continued to have intermittent air from his urethra.  His wife states she was told to monitor this by the urologist as well as Dr. Payne.  No fever.  Labs are pending this morning.  Will discuss timing of ordering the repeat CT scan with Dr. Dong.  He denies any increased pain.     -patient continues to complain of pneumaturia, he has more abd distention today, he reports past hx of DUNLAP cirrhosis with ascites. I discussed his case with Dr. Gutierres. Requested consult with Dr Payne as well given his pneumaturia. Also requested US abd. tolerating p.o. without emesis but does report some nausea.  Ambulating some but globally weak     -patient with no new concerns but continues to complain of pneumaturia.  He is tolerating p.o.  He is going to work with physical therapy today and I encouraged him to participate as much as he can so he can go to rehab.  He is scheduled for a cystogram today.  He complains of chronic musculoskeletal pain and reports he is on Percocet as an outpatient, followed by pain management.  He is concerned that his drug screen  will be negative for Percocet if he does not start taking it.  We discussed that he has been doing okay without Percocet however he states that he has had increased pain since being without it.  He agreed to compromise with resuming Percocet 5 mg twice a day as needed as opposed to his 10 mg 4 times a day as previously prescribed.    11/9 - Pt reports no new concerns today. His cystogram ([facility]) yesterday was essentially normal.  Tolerating p.o.  Was not able to work with physical therapy yesterday due to being off the floor.  He is willing to participate in therapy and anticipates discharge next week for rehab.    Review of Systems  General: No fever, chills, fatigue  ENT: No sore throat, trouble swallowing or changes in vision  Respiratory: No shortness of breath, cough, wheezing or fast breathing  Cardiovascular: No chest pain, palpitations, dyspnea with exertion  Gastrointestinal: No nausea vomiting, positive abdominal pain  Musculoskeletal: Positive difficulty walking due to weakness  Vascular: No cyanosis or clubbing  Lymphatic: No peripheral edema, no lymphadenopathy  Neurologic: No headache, confusion, dizziness  Psychiatric: Has had some mood swings    Objective   Objective     Vital Signs:   Temp:  [97.4 °F (36.3 °C)-97.8 °F (36.6 °C)] 97.5 °F (36.4 °C)  Heart Rate:  [77-88] 77  Resp:  [16-18] 18  BP: (110-167)/(68-98) 126/68        Physical Exam:  Constitutional: No acute distress, awake, alert, nontoxic, overweight body habitus  Eyes: Pupils equal, sclerae anicteric, no conjunctival injection  HENT: NCAT, mucous membranes moist  Neck: Supple, no thyromegaly, no lymphadenopathy  Respiratory: Clear to auscultation bilaterally, good effort, nonlabored respirations   Cardiovascular: RRR  Gastrointestinal: Positive bowel sounds, soft, mildly tender, mildly distended, obese  Musculoskeletal: No peripheral edema, normal muscle tone for age, left BKA  Psychiatric: Appropriate affect, good insight and judgement,  cooperative  Neurologic: Oriented x 3, movements symmetric BUE and BLE, Cranial Nerves grossly intact to confrontation, speech clear and fluent    Results Reviewed:    Results from last 7 days   Lab Units 11/07/19  0621 11/06/19  0813 11/05/19 0457   WBC 10*3/mm3 11.73* 11.43* 12.11*   HEMOGLOBIN g/dL 9.6* 9.0* 9.5*   HEMATOCRIT % 31.3* 29.5* 30.9*   PLATELETS 10*3/mm3 386 373 402     Results from last 7 days   Lab Units 11/07/19  0634 11/06/19  0813 11/05/19 0457 11/03/19  0804   SODIUM mmol/L 136 135* 140 138   POTASSIUM mmol/L 3.8 4.1 3.9 3.7   CHLORIDE mmol/L 98 102 101 102   CO2 mmol/L 25.0 23.0 25.0 25.0   BUN mg/dL 10 10 12 11   CREATININE mg/dL 0.62* 0.50* 0.50* 0.45*   GLUCOSE mg/dL 186* 226* 166* 227*   CALCIUM mg/dL 8.5* 8.1* 8.7 8.6   ALT (SGPT) U/L 21  --   --  17   AST (SGOT) U/L 24  --   --  20     Estimated Creatinine Clearance: 198.4 mL/min (A) (by C-G formula based on SCr of 0.62 mg/dL (L)).    Microbiology Results Abnormal     Procedure Component Value - Date/Time    Ova & Parasite Examination - Stool, Per Rectum [[id number]] Collected:  10/30/19 0507    Lab Status:  Final result Specimen:  Stool from Per Rectum Updated:  11/05/19 1353     Ova + Parasite Exam No ova or parasites seen on concentrated smear     Trichrome Stain No ova or parasites seen on trichrome stain    Stool Culture (Reference Lab) - Stool, Per Rectum [[id number]] Collected:  10/30/19 0507    Lab Status:  Final result Specimen:  Stool from Per Rectum Updated:  11/04/19 1708     Salmonella/Shigella Screen Final report     Result 1 Comment     Comment: No Salmonella or Shigella recovered.        Campylobacter Culture Final report     Result 1 Comment     Comment: No Campylobacter species isolated.        E coli, Shiga toxin Assay Negative    Narrative:       Performed at:  [address] [place] [address]  [id number]  : Dl Laird PhD, Phone:  [phone]    Gastrointestinal Panel, PCR ([employer])  - Stool, Per Rectum [[id number]] Collected:  10/30/19 0507    Lab Status:  Final result Specimen:  Stool from Per Rectum Updated:  11/01/19 1357     Reference Lab Report See Attached Report     Comment: See scanned report       Blood Culture - Blood, Wrist, Right [[id number]] Collected:  10/26/19 1805    Lab Status:  Final result Specimen:  Blood from Wrist, Right Updated:  10/31/19 2000     Blood Culture No growth at 5 days    Blood Culture - Blood, Hand, Right [[id number]] Collected:  10/26/19 1822    Lab Status:  Final result Specimen:  Blood from Hand, Right Updated:  10/31/19 2000     Blood Culture No growth at 5 days    Body Fluid Culture - Body Fluid, Kidney, Right [[id number]]  (Abnormal)  (Susceptibility) Collected:  10/29/19 1057    Lab Status:  Final result Specimen:  Body Fluid from Kidney, Right Updated:  10/31/19 0624     Body Fluid Culture Scant growth (1+) Klebsiella pneumoniae ssp pneumoniae     Gram Stain Moderate (3+) WBCs seen      No organisms seen    Susceptibility      Klebsiella pneumoniae ssp pneumoniae     EYAD     Ampicillin Resistant     Ampicillin + Sulbactam Susceptible     Cefazolin Susceptible     Cefepime Susceptible     Ceftazidime Susceptible     Ceftriaxone Susceptible     Gentamicin Susceptible     Levofloxacin Susceptible     Piperacillin + Tazobactam Susceptible     Trimethoprim + Sulfamethoxazole Susceptible                    Clostridium Difficile Toxin - Stool, Per Rectum [[id number]] Collected:  10/30/19 0507    Lab Status:  Final result Specimen:  Stool from Per Rectum Updated:  10/30/19 0882    Narrative:       The following orders were created for panel order Clostridium Difficile Toxin - Stool, Per Rectum.  Procedure                               Abnormality         Status                     ---------                               -----------         ------                     Clostridium Difficile To...[[id number]]  Normal              Final result                 Please  view results for these tests on the individual orders.    Clostridium Difficile Toxin, PCR - Stool, Per Rectum [[id number]]  (Normal) Collected:  10/30/19 0507    Lab Status:  Final result Specimen:  Stool from Per Rectum Updated:  10/30/19 0826     C. Difficile Toxins by PCR Not Detected    Narrative:       Performance characteristics of test not established for patients <2 years of age.  Negative for Toxigenic C. Difficile    Urine Culture - Urine, Urine, Clean Catch [[id number]]  (Abnormal) Collected:  10/28/19 1341    Lab Status:  Final result Specimen:  Urine, Clean Catch Updated:  10/29/19 1033     Urine Culture Yeast isolated          Imaging Results (Last 24 Hours)     Procedure Component Value Units Date/Time    FL Voiding Urethrocystogram [[id number]] Collected:  11/08/19 1632     Updated:  11/08/19 1632    Narrative:       EXAMINATION: FL VOIDING URETHROCYSTOGRAM-     INDICATION: pneumaturia in pt with previous claudia-scrotal abscess.   Suspect urethral source.  cysto neg.; N15.1-Renal and perinephric  abscess; Z86.19-Personal history of other infectious and parasitic  diseases; Z87.898-Personal history of other specified conditions;  E11.51-Type 2 diabetes mellitus with diabetic peripheral angiopathy  without gangrene; E11.65-Type 2 diabetes mellitus with hyperglycemia;  I10-Ess     TECHNIQUE: 2 minutes and 12 seconds of fluoroscopic time was used for  this exam. 5 associated images were saved.  imaging reveals a  nonobstructive bowel gas pattern. The patient arrived at the fluoroscopy  suite with a Walker catheter in situ. A single contrast study using  Cysto-Conray contrast media was performed. The bladder was filled in a  retrograde fashion.     COMPARISON: NONE     FINDINGS: Contrast was seen filling the bladder. The bladder mucosa  appeared grossly normal. No fixed filling defects were seen.  Vesicoureteral reflux was not noted during this exam. No extravasation  of contrast was seen. The urethra  appeared within normal limits on  voiding images. No strictures, or mucosal irregularities of the urethra  were seen.          Impression:       Fluoroscopic guided voiding cystourethrogram series appeared  within normal limits.           US Abdomen Complete [[id number]] Collected:  11/08/19 0749     Updated:  11/08/19 1000    Narrative:       EXAMINATION: US ABDOMEN COMPLETE-     INDICATION: Abdominal pain and distention, history of ascites, DUNLAP  cirrhosis; N15.1-Renal and perinephric abscess; Z86.19-Personal history  of other infectious and parasitic diseases; Z87.898-Personal history of  other specified conditions; E11.51-Type 2 diabetes mellitus with  diabetic peripheral angiopathy without gangrene; E11.65-Type 2 diabetes  mellitus with hyperglycemia; I10-Essential (primary) hypertension.      TECHNIQUE: Ultrasound abdomen complete.     COMPARISON: None.     FINDINGS: Visualized portions of the aorta and IVC are grossly  unremarkable.     Visualized portions of the pancreas within normal limits.     Liver demonstrates coarsened echotexture without focal parenchymal  lesion.     Gallbladder is present without cholelithiasis, gallbladder wall  thickening or pericholecystic fluid demonstrating minimal debris in the  dependent portion.     Common bile duct measures 2 mm in diameter at the level of the mimi  hepatis.     Right kidney measures 11 cm in length without evidence of  hydronephrosis, contour deforming mass or obvious calculi.     Left kidney measures 12 cm in length without evidence of hydronephrosis,  contour deforming mass or obvious calculi.     Spleen measures 15 cm enlarged in size with a single 1.4 cm hypoechoic  area of likely simple cyst associated.     No ascites.       Impression:       1. Mildly coarsened echotexture of the hepatic parenchyma may represent  hepatic parenchymal process. No focal liver lesion.  2. Splenomegaly measuring up to 15 cm in craniocaudal dimension.  3. No ascites.       D:  11/08/2019  E:  11/08/2019             Results for orders placed during the hospital encounter of 05/18/19   Adult Transthoracic Echo Complete W/ Cont if Necessary Per Protocol    Narrative · Estimated EF = 60%.  · Mild mitral valve regurgitation is present  · Mild tricuspid valve regurgitation is present.  · Calculated right ventricular systolic pressure from tricuspid   regurgitation is 29 mmHg.          I have reviewed the medications:  Scheduled Meds:    amLODIPine 10 mg Oral Q24H   aspirin 81 mg Oral Daily   DULoxetine 60 mg Oral Daily   fluconazole 200 mg Intravenous Daily   heparin (porcine) 5,000 Units Subcutaneous Q8H   hydrALAZINE 25 mg Oral Q8H   insulin detemir 22 Units Subcutaneous Q12H   insulin lispro 0-7 Units Subcutaneous 4x Daily With Meals & Nightly   insulin lispro 5 Units Subcutaneous TID With Meals   metoprolol tartrate 100 mg Oral Q12H   nystatin  Topical BID   piperacillin-tazobactam 3.375 g Intravenous Q8H   saccharomyces boulardii 250 mg Oral BID   sevelamer 800 mg Oral TID With Meals   sodium chloride 10 mL Intravenous Q12H   terazosin 5 mg Oral Nightly     Continuous Infusions:     PRN Meds:.•  acetaminophen **OR** acetaminophen **OR** acetaminophen  •  dextrose  •  dextrose  •  glucagon (human recombinant)  •  magnesium sulfate **OR** magnesium sulfate **OR** magnesium sulfate  •  ondansetron  •  oxyCODONE-acetaminophen  •  potassium chloride **OR** potassium chloride **OR** potassium chloride  •  Insert peripheral IV **AND** sodium chloride  •  sodium chloride      Assessment/Plan   Assessment / Plan     Active Hospital Problems    Diagnosis  POA   • Pneumaturia [R39.89]  Clinically Undetermined   • Perinephric abscess [N15.1]  Yes   • Peripheral vascular disease (CMS/HCC) [I73.9]  Yes   • S/P BKA (below knee amputation), left (CMS/HCC) [Z89.512]  Not Applicable   • Diabetic ulcer of right lower leg (CMS/HCC) [E11.622, L97.919]  Yes   • Type 2 diabetes mellitus with circulatory  disorder and uncontrolled [E11.59]  Yes   • Hyperlipemia [E78.5]  Yes   • Essential hypertension [I10]  Yes      Resolved Hospital Problems   No resolved problems to display.        Brief Hospital Course to date:  Kendrick Crystal is a 49 y.o. male admitted with acute sepsis secondary to recurrent klebsiella bacteremia with perinephric abscess, status post perinephric drain (inadvertently removed on 11/4/2019).  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  His [facility]G was normal on 11/8/2019.  Dr. Gutierres with urology is following.    Sepsis secondary to perinephric abscess  -klebsiella grew from culture, continue zosyn and diflucan  -Dr. Dong follows  -Consulted Urology Dr Gutierres, perinephric drain inadvertently removed on 11/4/2019, however no evidence of abscess recurrence thus far  -Plan to continue to monitor, repeat CT if any clinical decline as discussed with infectious disease  -Abdominal ultrasound was reassuring on 11/7/2019     Abdominal distention with history of Johnston cirrhosis and ascites  - ultrasound of the abdomen on 11/7/2019 was reassuring and did not show any signs of ascites or hydronephrosis    Pneumaturia  -Discussed with Dr. Gutierres  -Requested consult with Dr. Payne who repaired his fistula in the past  -[facility] on 11/8/2019 was essentially normal    Left BKA    Hypomag  -replace per protocol     Diabetes mellitus, poorly controlled  -continue basal/bolus, adjust as needed.   -Glucose range 119-186    Hypertension  -Continue amlodipine and metoprolol  -Hytrin 5 mg HS     Hx of C-diff  -loose stool resolved     Depression  -continue cymbalta 30 mg daily     Wound to the right ankle  -wound care     Generalized weakness  -encouraged participation with PT     Medical Non-compliance  -complicates care    Chronic generalized musculoskeletal pain  -He is followed by outpatient pain management and gets a prescription for Percocet  monthly  -Since he has been off the Percocet for several weeks now I have resumed at a lower dose on 11/8/2019 and encouraged him to try plain Tylenol instead.    DVT Prophylaxis: Heparin      CODE STATUS:   Code Status and Medical Interventions:   Ordered at: 10/26/19 2039     Level Of Support Discussed With:    Patient     Code Status:    CPR     Medical Interventions (Level of Support Prior to Arrest):    Full         Electronically signed by Jerri Forbes MD, 11/09/19, 9:01 AM.

## 2019-11-09 NOTE — PLAN OF CARE
Problem: Fall Risk (Adult)  Goal: Identify Related Risk Factors and Signs and Symptoms  Outcome: Ongoing (interventions implemented as appropriate)    Goal: Absence of Fall  Outcome: Outcome(s) achieved Date Met: 11/09/19      Problem: Skin Injury Risk (Adult)  Goal: Skin Health and Integrity  Outcome: Ongoing (interventions implemented as appropriate)      Problem: Diabetes, Type 2 (Adult)  Goal: Signs and Symptoms of Listed Potential Problems Will be Absent, Minimized or Managed (Diabetes, Type 2)  Outcome: Ongoing (interventions implemented as appropriate)

## 2019-11-10 LAB
ANION GAP SERPL CALCULATED.3IONS-SCNC: 11 MMOL/L (ref 5–15)
BASOPHILS # BLD AUTO: 0.09 10*3/MM3 (ref 0–0.2)
BASOPHILS NFR BLD AUTO: 0.8 % (ref 0–1.5)
BUN BLD-MCNC: 14 MG/DL (ref 6–20)
BUN/CREAT SERPL: 23.7 (ref 7–25)
CALCIUM SPEC-SCNC: 8.5 MG/DL (ref 8.6–10.5)
CHLORIDE SERPL-SCNC: 104 MMOL/L (ref 98–107)
CO2 SERPL-SCNC: 25 MMOL/L (ref 22–29)
CREAT BLD-MCNC: 0.59 MG/DL (ref 0.76–1.27)
DEPRECATED RDW RBC AUTO: 51 FL (ref 37–54)
EOSINOPHIL # BLD AUTO: 0.3 10*3/MM3 (ref 0–0.4)
EOSINOPHIL NFR BLD AUTO: 2.8 % (ref 0.3–6.2)
ERYTHROCYTE [DISTWIDTH] IN BLOOD BY AUTOMATED COUNT: 15.9 % (ref 12.3–15.4)
GFR SERPL CREATININE-BSD FRML MDRD: 145 ML/MIN/1.73
GLUCOSE BLD-MCNC: 202 MG/DL (ref 65–99)
GLUCOSE BLDC GLUCOMTR-MCNC: 139 MG/DL (ref 70–130)
GLUCOSE BLDC GLUCOMTR-MCNC: 179 MG/DL (ref 70–130)
GLUCOSE BLDC GLUCOMTR-MCNC: 206 MG/DL (ref 70–130)
GLUCOSE BLDC GLUCOMTR-MCNC: 229 MG/DL (ref 70–130)
HCT VFR BLD AUTO: 32.2 % (ref 37.5–51)
HGB BLD-MCNC: 9.7 G/DL (ref 13–17.7)
IMM GRANULOCYTES # BLD AUTO: 0.09 10*3/MM3 (ref 0–0.05)
IMM GRANULOCYTES NFR BLD AUTO: 0.8 % (ref 0–0.5)
LYMPHOCYTES # BLD AUTO: 2.15 10*3/MM3 (ref 0.7–3.1)
LYMPHOCYTES NFR BLD AUTO: 20.3 % (ref 19.6–45.3)
MCH RBC QN AUTO: 26.9 PG (ref 26.6–33)
MCHC RBC AUTO-ENTMCNC: 30.1 G/DL (ref 31.5–35.7)
MCV RBC AUTO: 89.2 FL (ref 79–97)
MONOCYTES # BLD AUTO: 0.83 10*3/MM3 (ref 0.1–0.9)
MONOCYTES NFR BLD AUTO: 7.8 % (ref 5–12)
NEUTROPHILS # BLD AUTO: 7.13 10*3/MM3 (ref 1.7–7)
NEUTROPHILS NFR BLD AUTO: 67.5 % (ref 42.7–76)
NRBC BLD AUTO-RTO: 0.2 /100 WBC (ref 0–0.2)
PLATELET # BLD AUTO: 339 10*3/MM3 (ref 140–450)
PMV BLD AUTO: 10.4 FL (ref 6–12)
POTASSIUM BLD-SCNC: 4 MMOL/L (ref 3.5–5.2)
RBC # BLD AUTO: 3.61 10*6/MM3 (ref 4.14–5.8)
SODIUM BLD-SCNC: 140 MMOL/L (ref 136–145)
WBC NRBC COR # BLD: 10.59 10*3/MM3 (ref 3.4–10.8)

## 2019-11-10 PROCEDURE — 25010000002 HEPARIN (PORCINE) PER 1000 UNITS: Performed by: NURSE PRACTITIONER

## 2019-11-10 PROCEDURE — 25010000002 FLUCONAZOLE PER 200 MG: Performed by: INTERNAL MEDICINE

## 2019-11-10 PROCEDURE — 63710000001 INSULIN LISPRO (HUMAN) PER 5 UNITS: Performed by: NURSE PRACTITIONER

## 2019-11-10 PROCEDURE — 80048 BASIC METABOLIC PNL TOTAL CA: CPT | Performed by: FAMILY MEDICINE

## 2019-11-10 PROCEDURE — 63710000001 INSULIN DETEMIR PER 5 UNITS: Performed by: INTERNAL MEDICINE

## 2019-11-10 PROCEDURE — 82962 GLUCOSE BLOOD TEST: CPT

## 2019-11-10 PROCEDURE — 99232 SBSQ HOSP IP/OBS MODERATE 35: CPT | Performed by: FAMILY MEDICINE

## 2019-11-10 PROCEDURE — 25010000002 PIPERACILLIN SOD-TAZOBACTAM PER 1 G: Performed by: INTERNAL MEDICINE

## 2019-11-10 PROCEDURE — 85025 COMPLETE CBC W/AUTO DIFF WBC: CPT | Performed by: FAMILY MEDICINE

## 2019-11-10 RX ORDER — LOPERAMIDE HYDROCHLORIDE 2 MG/1
2 CAPSULE ORAL DAILY PRN
Status: DISCONTINUED | OUTPATIENT
Start: 2019-11-10 | End: 2019-11-15 | Stop reason: HOSPADM

## 2019-11-10 RX ADMIN — INSULIN LISPRO 3 UNITS: 100 INJECTION, SOLUTION INTRAVENOUS; SUBCUTANEOUS at 21:50

## 2019-11-10 RX ADMIN — INSULIN LISPRO 3 UNITS: 100 INJECTION, SOLUTION INTRAVENOUS; SUBCUTANEOUS at 10:35

## 2019-11-10 RX ADMIN — Medication 250 MG: at 21:49

## 2019-11-10 RX ADMIN — HEPARIN SODIUM 5000 UNITS: 5000 INJECTION INTRAVENOUS; SUBCUTANEOUS at 21:49

## 2019-11-10 RX ADMIN — NYSTATIN: 100000 CREAM TOPICAL at 21:55

## 2019-11-10 RX ADMIN — FLUCONAZOLE 200 MG: 200 INJECTION, SOLUTION INTRAVENOUS at 10:33

## 2019-11-10 RX ADMIN — METOPROLOL TARTRATE 100 MG: 100 TABLET ORAL at 10:28

## 2019-11-10 RX ADMIN — INSULIN LISPRO 3 UNITS: 100 INJECTION, SOLUTION INTRAVENOUS; SUBCUTANEOUS at 13:21

## 2019-11-10 RX ADMIN — SODIUM CHLORIDE, PRESERVATIVE FREE 10 ML: 5 INJECTION INTRAVENOUS at 21:51

## 2019-11-10 RX ADMIN — METOPROLOL TARTRATE 100 MG: 100 TABLET ORAL at 21:49

## 2019-11-10 RX ADMIN — TAZOBACTAM SODIUM AND PIPERACILLIN SODIUM 3.38 G: 375; 3 INJECTION, SOLUTION INTRAVENOUS at 18:00

## 2019-11-10 RX ADMIN — AMLODIPINE BESYLATE 10 MG: 10 TABLET ORAL at 10:28

## 2019-11-10 RX ADMIN — ASPIRIN 81 MG: 81 TABLET, COATED ORAL at 10:28

## 2019-11-10 RX ADMIN — Medication 250 MG: at 10:27

## 2019-11-10 RX ADMIN — HYDRALAZINE HYDROCHLORIDE 25 MG: 25 TABLET, FILM COATED ORAL at 06:30

## 2019-11-10 RX ADMIN — INSULIN LISPRO 5 UNITS: 100 INJECTION, SOLUTION INTRAVENOUS; SUBCUTANEOUS at 10:28

## 2019-11-10 RX ADMIN — TERAZOSIN HYDROCHLORIDE ANHYDROUS 5 MG: 5 CAPSULE ORAL at 21:50

## 2019-11-10 RX ADMIN — TAZOBACTAM SODIUM AND PIPERACILLIN SODIUM 3.38 G: 375; 3 INJECTION, SOLUTION INTRAVENOUS at 03:03

## 2019-11-10 RX ADMIN — HEPARIN SODIUM 5000 UNITS: 5000 INJECTION INTRAVENOUS; SUBCUTANEOUS at 06:30

## 2019-11-10 RX ADMIN — HEPARIN SODIUM 5000 UNITS: 5000 INJECTION INTRAVENOUS; SUBCUTANEOUS at 13:19

## 2019-11-10 RX ADMIN — OXYCODONE HYDROCHLORIDE AND ACETAMINOPHEN 1 TABLET: 5; 325 TABLET ORAL at 21:49

## 2019-11-10 RX ADMIN — HYDRALAZINE HYDROCHLORIDE 25 MG: 25 TABLET, FILM COATED ORAL at 21:49

## 2019-11-10 RX ADMIN — TAZOBACTAM SODIUM AND PIPERACILLIN SODIUM 3.38 G: 375; 3 INJECTION, SOLUTION INTRAVENOUS at 10:34

## 2019-11-10 RX ADMIN — INSULIN LISPRO 5 UNITS: 100 INJECTION, SOLUTION INTRAVENOUS; SUBCUTANEOUS at 13:20

## 2019-11-10 RX ADMIN — NYSTATIN: 100000 CREAM TOPICAL at 10:34

## 2019-11-10 RX ADMIN — HYDRALAZINE HYDROCHLORIDE 25 MG: 25 TABLET, FILM COATED ORAL at 13:19

## 2019-11-10 RX ADMIN — INSULIN DETEMIR 22 UNITS: 100 INJECTION, SOLUTION SUBCUTANEOUS at 10:32

## 2019-11-10 RX ADMIN — INSULIN DETEMIR 22 UNITS: 100 INJECTION, SOLUTION SUBCUTANEOUS at 21:51

## 2019-11-10 RX ADMIN — DULOXETINE HYDROCHLORIDE 60 MG: 60 CAPSULE, DELAYED RELEASE ORAL at 10:27

## 2019-11-10 NOTE — PROGRESS NOTES
[facility] Progress Note        Antibiotics:  Anti-Infectives (From admission, onward)    Ordered     Dose/Rate Route Frequency Start Stop    19 0848  fluconazole (DIFLUCAN) IVPB 200 mg     Shauna Sanford RN let the order  on 19 1844.   Ordering Provider:  Usman Dong MD    200 mg  100 mL/hr over 60 Minutes Intravenous Daily 19 0945 19 0859    10/30/19 0725  fluconazole (DIFLUCAN) IVPB 200 mg     Ordering Provider:  Usman Dong MD    200 mg  over 60 Minutes Intravenous Daily 10/30/19 0900 19 1028    10/28/19 0743  piperacillin-tazobactam (ZOSYN) 3.375 g in iso-osmotic dextrose 50 ml (premix)     Usman Dong MD reviewed the order on 19 1034.   Ordering Provider:  Usman Dong MD    3.375 g  over 4 Hours Intravenous Every 8 Hours 10/28/19 1700 19 1659    10/28/19 0743  piperacillin-tazobactam (ZOSYN) 3.375 g in iso-osmotic dextrose 50 ml (premix)     Ordering Provider:  Usman Dong MD    3.375 g  over 30 Minutes Intravenous Once 10/28/19 1100 10/28/19 1303    10/26/19 2040  meropenem (MERREM) 1 g/100 mL 0.9% NS VTB (mbp)     Ordering Provider:  Susan Infante APRN    1 g  over 30 Minutes Intravenous Once 10/26/19 2130 10/26/19 2241          CC: fatigue    HPI:    Patient is a 49 y.o.  Yr old male with history of poorly contolled T2DM, DUNLAP/liver cirrhosis, HTN, PVD/Left BKA, and multiple skin abscesses/MRSA who presented to [facility] ED with right shin wound infection summer 2018.  He was unable to get to see Dr. Martinez as his father passed away unexpectedly on 18 and he had to wait until after the .  The wound worsened becoming gangrenous and he came to [facility] ED in 2018.  He also had been hospitalized at [facility] and then transferred to  for a severe penis/scrotum infection requiring surgical debridement in summer 2018.  He received antibiotics regarding his right leg infection, generally improved  over the remainder of summer 2018 but that area had not completely healed. He was admitted April 24, 2019 with acute left lower abdominal wall redness/swelling and pain, spontaneous drainage associated with fever/chills and uncontrolled blood sugars.  4/26 I&D requiring wide debridement , severe/deep infection and transferred to [facility] on May 3 with IV Zosyn/oral doxycycline. Cultures had lactobacillus and mixed gram-positive skin sherly including alpha strep, staph epidermidis and corynebacterium. Readmitted to [facility] May 18, 2019, increasing generalized edema ultimately transitioned to oral doxycycline and healed.     He presented to the emergency room July 30, 2019 with acute rectal pain that had begun 7/27; this is associated with constipation for days, chills and nausea/dry heaving.  White blood cell count elevated, high hemoglobin A1c over 13, urinalysis with hematuria/pyuria and CT scan with 3 x 3 cm fluid collection anterior to the distal rectum and per discussion with Dr. Akbar/Jennifer, this is not in the prostate.  Colorectal surgery/urology saw him for consideration of surgical options/timing/threshold, etc..     8/2/19 I&Dper Dr Payne perirectal abscess; patient reports that he had instrumented his rectum prior to hospitalization with enema     8/3/19 urinary retention overnight requiring in/out catheterization per patient.  Creat climb     8/4/19 further creat climb; childs placed and lisinopril stopped and d/w Dr Rubio;  ?obstruction initially associated with retention postop (1200 out with I/O cath postop per nursing) -v- contrast from CT -v- lisinopril/medication/vancomycin -v- relative hypotension -v- likely combination of factors with ATN; nephrology following; vancomycin trough high and vancomycin stopped empirically 8/3 although high trough potentially accumulation as a consequence of diminishing renal function associated with retention/contrast/pressure rather than cause.  " Nonetheless, avoid for now; creatinine trending down.        8/7 in retrospect he remembers air in his urine at admit which has raised concern for possible fistula from urinary tract;  No fecaluria and culture from abscess is fecal sherly;  ?rectal-urethral fistula;  Dr Payne aware     Culture with E. coli/Klebsiella species and creatinine generally trended down, transitioned to oral antibiotics at discharge.     Readmitted August 17, 2019 with nausea/vomiting/dry heaves for 3 days.  Walker catheter was placed and CT scan of the abdomen showed:      \"Previously seen fluid collection identified inferior to the prostate gland is more increased in density on today's examination. There is wall thickening again seen throughout the bladder. Findings again are concerning for cystitis.\" per radiology     He was transitioned to oral omnicef/topical antifungal on approx 8/23/19; Noncompliant with f/u in our office     READMITTED 10/8/19 RLQ abd pain, urinary retention, nausea, dysuria and generalized fatigue     UTI by U/A, subsequent blood cultures positive for  kleb sp, urine with kleb and e coli ;  Abnormal CT abd with right perinephric fluid collection, advanced cirrhosis, urinary bladder thickening.  10/9 Aspirate of perinephric fluid collection consistent with abscess/kleb and white blood cells; 10/16/19 cytoscopy with bullous change per Dr Gutierres but no fistula per him; 10/19/19 intermittent nausea, no vomiting or dry heaves this am, intermittent dry cough broadened to zosyn with ?aspiration pneumonia evolving after dry heaves/vomiting and had intermittent diarrhea, oral vancomycin added empirically with history of prior C. Difficile; improved with IV Zosyn/oral vancomycin and he refused consideration of placement.  He insisted on home, discharged October 23 and noncompliant with outpatient therapy and outpatient follow-up October 25.  He had become fatigued such that his family could not assist him out of bed and it was " recommended by my office that he return to the emergency room October 25.  He apparently refused that but did come to the emergency room October 26.     Readmitted October 26, 2019 with generalized weakness, fatigue/malaise and nausea/vomiting/diarrhea.  CT scan revealed increased size of perinephric fluid collection per radiology.     On October 28, patient had reported increased diarrhea, at least 6 episodes overnight and watery/thin but no hematochezia melena or hematemesis.  Vomiting has subsided     10/29 drain placed    11/5/19 drain inadvertently out overnight per him    11/8 voiding cystourethrogram per urology; ultrasound abdomen with no mention of abscess per radiology    11/10/19 doing ok per him;  GI habits variable, but no vomiting today; stool consistency varies and not always diarrhea per family; vague abdominal discomfort that he describes as dull, intermittent, nonradiating, worse with palpation, generally better with pain meds and 2-3 out of 10.  No dysuria hematuria or pyuria.  Denies new hesitancy urgency or flank pain.      No headache photophobia or neck stiffness.  No shortness of breath cough or hemoptysis.  No fevers chills or sweats.  No rash.  No other particular exposures     ROS:      11/10/19 No f/c/s. No vomiting. No rash. No new ADR to Abx.     Constitutional-- No Fever, chills or sweats.  Appetite diminished with generalized malaise and fatigue  Heent-- No new vision, hearing or throat complaints.  No epistaxis or oral sores.  Denies odynophagia or dysphagia.  No flashers, floaters or eye pain. No odynophagia or dysphagia. No headache, photophobia or neck stiffness.  CV-- No chest pain, palpitation or syncope  Resp-- No SOB/cough/Hemoptysis  GI-as above.  No hematochezia, melena, or hematemesis. Denies jaundice or chronic liver disease.  -- No dysuria, hematuria, or flank pain.  Denies hesitancy or flank pain.  Lymph- no swollen lymph nodes in neck/axilla or groin.   Heme- No  "active bruising or bleeding; no Hx of DVT or PE.  MS-- no swelling or pain in the bones or joints of arms/legs.  No new back pain.  Neuro-- No acute focal weakness or numbness in the arms or legs.  No seizures.     Full 12 point review of systems reviewed and negative otherwise for acute complaints, except for above      PE:   /89   Pulse 87   Temp 97.4 °F (36.3 °C) (Oral)   Resp 16   Ht 190.5 cm (75\")   Wt 123 kg (271 lb 8 oz)   SpO2 95%   BMI 33.94 kg/m²     GENERAL: Awake and alert, in no acute distress.   HEENT: Normocephalic, atraumatic.  PERRL. EOMI. No conjunctival injection. No icterus. Oropharynx clear without evidence of thrush or exudate. No evidence of peridontal disease.    NECK: Supple without nuchal rigidity. No mass.  LYMPH: No cervical, axillary or inguinal lymphadenopathy.  HEART: RRR; No murmur, rubs, gallops.   LUNGS: Diminished at bases to auscultation bilaterally with slight rhonchi bilateral but no wheezing or rales. Normal respiratory effort. Nonlabored. No dullness.  ABDOMEN: Soft, nontender, nondistended. Positive bowel sounds. No rebound or guarding. NO mass or HSM.  EXT:  No cyanosis, clubbing or edema. No cord.  : vague erythema/sattelite lesions c/w cutaneous candidiasis  MSK: FROM without joint effusions noted arms/legs.    SKIN: Warm and dry without cutaneous eruptions on Inspection/palpation.    NEURO: Oriented to PPT. No focal deficits on motor/sensory exam at arms/legs.     No peripheral stigmata/phenomena of endocarditis     PICC line without obvious redness or drainage     Laboratory Data    Results from last 7 days   Lab Units 11/10/19  0904 11/07/19  0621 11/06/19  0813   WBC 10*3/mm3 10.59 11.73* 11.43*   HEMOGLOBIN g/dL 9.7* 9.6* 9.0*   HEMATOCRIT % 32.2* 31.3* 29.5*   PLATELETS 10*3/mm3 339 386 373     Results from last 7 days   Lab Units 11/10/19  0904   SODIUM mmol/L 140   POTASSIUM mmol/L 4.0   CHLORIDE mmol/L 104   CO2 mmol/L 25.0   BUN mg/dL 14 "   CREATININE mg/dL 0.59*   GLUCOSE mg/dL 202*   CALCIUM mg/dL 8.5*     Results from last 7 days   Lab Units 11/07/19  0634   ALK PHOS U/L 224*   BILIRUBIN mg/dL <0.2*   ALT (SGPT) U/L 21   AST (SGOT) U/L 24               Estimated Creatinine Clearance: 209.3 mL/min (A) (by C-G formula based on SCr of 0.59 mg/dL (L)).      Microbiology:      Radiology:  Imaging Results (last 72 hours)     Procedure Component Value Units Date/Time    CT Abdomen Pelvis With Contrast [[id number]] Collected:  10/26/19 2035     Updated:  10/26/19 2037    Narrative:       CT ABDOMEN AND PELVIS WITH CONTRAST, 10/26/2019    HISTORY:  49-year-old male with recent history of a right perinephric fluid collection that underwent diagnostic needle aspiration 10/9/2019. He has been on IV antibiotic therapy for possible perinephric abscess. Examination is requested today for follow-up.    TECHNIQUE:  CT imaging of the abdomen and pelvis with IV contrast. Radiation dose reduction techniques included automated exposure control. Radiation audit for CT and nuclear cardiology exams in the last 12 months: 0.    ABDOMEN FINDINGS:  Rim-enhancing pericapsular or subcapsular fluid collection along the posterior margin of the lower pole right kidney has enlarged since 10/18/2019. It previously measured about 5.7 x 3.9 cm axially and currently measures 6.0 x 4.8 cm in the same location  (see axial image 48). There is associated perinephric soft tissue stranding, most likely inflammatory. The findings are compatible with clinically suspected perinephric abscess.    Both kidneys enhance normally. There is no evidence of upper urinary tract obstruction.    Liver, pancreas and spleen are within normal limits. No bile duct or pancreatic duct dilatation.    Small bowel and colon are normal in caliber and appearance without GI contrast. Normal appendix. No gastric distention, hiatal hernia or distal esophageal dilatation.    PELVIS FINDINGS:  Urinary bladder, prostate  and rectum are within normal limits.    Lung base images show a tiny right pleural effusion.      Impression:       1.  Likely perinephric or subcapsular abscess along the posterior aspect of the lower pole right kidney. This has enlarged since 10/18/2019.  2.  Normal renal parenchymal enhancement. No CT evidence of pyelonephritis at this time. No evidence of upper urinary tract obstruction.  3.  Tiny right pleural effusion.    Signer Name: Fadi Healy MD   Signed: 10/26/2019 8:35 PM   Workstation Name: [email or web address]    [facility]            Impression:      --Acute Kleb septicemia/sepsis on treatment since last admit, likely urinary source/pyelnoephritis associated with urinary retention and  with abnormal CT scan with perinephric collection/abscess and Kleb on aspirate that has persisted as of aspirate 10/29 arguing relatively sequestered focus not penetrated by systemic abx and now with drain placed 10/29;  IV  Zosyn ongoing; urology to determine any timing/option or threshold for further intervention such as surgical debridement if not responsive to drain/abx, or other functional/anatomic assessment.  DRAIN INADVERTENTLY OUT 11/4;  Monitor clinically,   D/w Dr Forbes; ultrasound done on November 8 with no mention of abscess by radiology.  Need radiology clarification as to whether this modality is good enough in comparison to prior CT scans to know whether abscess is better/gone versus need for different modality such as repeat CT scan to better clarify.     --Acute perinephric abscess as above;   Urology following to help guide timing/option/threshold for further drainage (perc -v- other);  Perc drain 10/29 and kleb persisted in culture; see above regarding repeat/recent scans    --urinary frequency with cutaneous candidiasis and candiduria/pyruria;  S/p diflucan     --abnormal imaging with dry cough and probable pneumonia last admission;  At risk for aspiration with recent  vomiting/dry heaves and empiric zosyn started 10/19; changed to Invanz 10/23 to help facilitate home therapy with mom and changed back to Zosyn October 28;   no productive cough at present, but if it develops, then send for culture     --acute  diarrhea 10/27-10/28 with Hx CDiff per him/family and recent empiric oral vancomycin, CDT negative and oral vancomycin stopped;  If recurrent diarrhea then consider more wrolup or empiric therapy     --Hx perirectal abscess ; Colorectal surgery, Dr. Payne previously saw and is following.  Drainage as above on August 2 with mxed Fecal sherly in culture; CT scans  with no mention of abscess on 8/18 study;   any timing/option or threshold for further GI/colorectal work-up per Dr payne/CRS     --History urinary retention.  urology following     --Hx ARF in August 2019;  ?obstruction initially associated with retention postop (1200 out with I/O cath postop per nursing) -v- contrast from CT -v- lisinopril/medication/vancomycin -v- relative hypotension -v- likely combination of factors with ATN;  vancomycin trough high and vancomycin stopped empirically 8/3 although high trough potentially accumulation as a consequence of diminishing renal function associated with retention/contrast/pressure rather than cause.  Nonetheless, avoiding for now; likely further acute kidney injury at admission August 17 associated with dehydration/prerenal/ATN etiology     --History MRSA;  Not in current culture     --Diabetes mellitus type 2, uncontrolled.  He reports the reason for this is forgetfulness in past     --History [place] and chronic liver disease/cirrhosis  per past notes     --Left BKA     --Peripheral vascular disease     --medical noncompliance and inability to care for self at home.       PLAN:      --IV Zosyn; IV diflucan      --Check/review labs cultures and scans     --Discussed with microbiology     --History per nursing staff      --Discussed with family, partial history per  them     --Highly complex set of issues with high risk for further serious morbidity and other serious sequela     --Colorectal surgery and urology have followed; urology following to determine timing/option or threshold for further percutaneous aspiration/drainage versus other surgery regarding  Abscess and to guide further workup for air in urine     --D/w Dr Andrei Dong MD  11/10/2019

## 2019-11-10 NOTE — PROGRESS NOTES
[facility] Medicine Services  PROGRESS NOTE    Patient Name: Kendrick Crystal  : 1970  MRN: [id number]    Date of Admission: 10/26/2019  Primary Care Physician: Provider, No Known    Subjective   Subjective     CC:  Infection    HPI:  Patient is a 49-year-old with complicated hospital course admitted for recurrent Klebsiella bacteremia with perinephric abscess, status post perinephric drain (inadvertently removed on 2019).     - Patient states he feels fine as of right now.  His drain was accidentally dislodged yesterday.  I discussed his case with Dr. Dong and agreed and close monitoring with repeat CT scan after 24 hours or sooner if he gets sick.  Patient is tolerating p.o. well.  He denies nausea vomiting or constipation.  He is ambulating some.  He denies new concerns.     -patient denies new complaints.  He has continued to have intermittent air from his urethra.  His wife states she was told to monitor this by the urologist as well as Dr. Payne.  No fever.  Labs are pending this morning.  Will discuss timing of ordering the repeat CT scan with Dr. Dong.  He denies any increased pain.     -patient continues to complain of pneumaturia, he has more abd distention today, he reports past hx of DUNLAP cirrhosis with ascites. I discussed his case with Dr. Gutierres. Requested consult with Dr Payne as well given his pneumaturia. Also requested US abd. tolerating p.o. without emesis but does report some nausea.  Ambulating some but globally weak     -patient with no new concerns but continues to complain of pneumaturia.  He is tolerating p.o.  He is going to work with physical therapy today and I encouraged him to participate as much as he can so he can go to rehab.  He is scheduled for a cystogram today.  He complains of chronic musculoskeletal pain and reports he is on Percocet as an outpatient, followed by pain management.  He is concerned that his drug screen  will be negative for Percocet if he does not start taking it.  We discussed that he has been doing okay without Percocet however he states that he has had increased pain since being without it.  He agreed to compromise with resuming Percocet 5 mg twice a day as needed as opposed to his 10 mg 4 times a day as previously prescribed.    11/9 - Pt reports no new concerns today. His cystogram ([facility]) yesterday was essentially normal.  Tolerating p.o.  Was not able to work with physical therapy yesterday due to being off the floor.  He is willing to participate in therapy and anticipates discharge next week for rehab.    11/10 -patient is doing fine today.  He was able to work with therapy some.  He is tolerating p.o. and ambulating with assistance.  No fever no increased pain.  Discussed with Dr. Pino regarding repeat imaging, still considering timing of CT scan versus recent ultrasound being sufficient.  He does report some diarrhea overnight x3 episodes.    Review of Systems  General: No fever, chills, fatigue  ENT: No sore throat, trouble swallowing or changes in vision  Respiratory: No shortness of breath, cough, wheezing or fast breathing  Cardiovascular: No chest pain, palpitations, dyspnea with exertion  Gastrointestinal: No nausea vomiting, positive abdominal pain, positive loose stool x3 overnight  Musculoskeletal: Positive difficulty walking due to weakness, left BKA  Vascular: No cyanosis or clubbing  Lymphatic: No peripheral edema, no lymphadenopathy  Neurologic: No headache, confusion, dizziness  Psychiatric: Has had some mood swings    Objective   Objective     Vital Signs:   Temp:  [97.2 °F (36.2 °C)-97.7 °F (36.5 °C)] 97.4 °F (36.3 °C)  Heart Rate:  [82-99] 87  Resp:  [16-18] 16  BP: (138-178)/(76-97) 145/89        Physical Exam:  Constitutional: No acute distress, awake, alert, nontoxic, overweight body habitus  Eyes: Pupils equal, sclerae anicteric, no conjunctival injection  HENT: NCAT, mucous  membranes moist  Neck: Supple, no thyromegaly, no lymphadenopathy  Respiratory: Clear to auscultation bilaterally, good effort, nonlabored respirations   Cardiovascular: RRR  Gastrointestinal: Positive bowel sounds, soft, mildly tender, mildly distended, obese  Musculoskeletal: No peripheral edema, normal muscle tone for age, left BKA  Psychiatric: Appropriate affect, good insight and judgement, cooperative  Neurologic: Oriented x 3, movements symmetric BUE and BLE, Cranial Nerves grossly intact to confrontation, speech clear and fluent    Results Reviewed:    Results from last 7 days   Lab Units 11/07/19  0621 11/06/19  0813 11/05/19 0457   WBC 10*3/mm3 11.73* 11.43* 12.11*   HEMOGLOBIN g/dL 9.6* 9.0* 9.5*   HEMATOCRIT % 31.3* 29.5* 30.9*   PLATELETS 10*3/mm3 386 373 402     Results from last 7 days   Lab Units 11/07/19  0634 11/06/19  0813 11/05/19 0457   SODIUM mmol/L 136 135* 140   POTASSIUM mmol/L 3.8 4.1 3.9   CHLORIDE mmol/L 98 102 101   CO2 mmol/L 25.0 23.0 25.0   BUN mg/dL 10 10 12   CREATININE mg/dL 0.62* 0.50* 0.50*   GLUCOSE mg/dL 186* 226* 166*   CALCIUM mg/dL 8.5* 8.1* 8.7   ALT (SGPT) U/L 21  --   --    AST (SGOT) U/L 24  --   --      Estimated Creatinine Clearance: 199.2 mL/min (A) (by C-G formula based on SCr of 0.62 mg/dL (L)).    Microbiology Results Abnormal     Procedure Component Value - Date/Time    Ova & Parasite Examination - Stool, Per Rectum [[id number]] Collected:  10/30/19 3788    Lab Status:  Final result Specimen:  Stool from Per Rectum Updated:  11/05/19 1353     Ova + Parasite Exam No ova or parasites seen on concentrated smear     Trichrome Stain No ova or parasites seen on trichrome stain    Stool Culture (Reference Lab) - Stool, Per Rectum [[id number]] Collected:  10/30/19 4269    Lab Status:  Final result Specimen:  Stool from Per Rectum Updated:  11/04/19 1708     Salmonella/Shigella Screen Final report     Result 1 Comment     Comment: No Salmonella or Shigella recovered.         Campylobacter Culture Final report     Result 1 Comment     Comment: No Campylobacter species isolated.        E coli, Shiga toxin Assay Negative    Narrative:       Performed at:  [address] [place] [address]  [id number]  : Dl Laird PhD, Phone:  [phone]    Gastrointestinal Panel, PCR ([facility]) - Stool, Per Rectum [[id number]] Collected:  10/30/19 0508    Lab Status:  Final result Specimen:  Stool from Per Rectum Updated:  11/01/19 1357     Reference Lab Report See Attached Report     Comment: See scanned report       Blood Culture - Blood, Wrist, Right [[id number]] Collected:  10/26/19 1805    Lab Status:  Final result Specimen:  Blood from Wrist, Right Updated:  10/31/19 2000     Blood Culture No growth at 5 days    Blood Culture - Blood, Hand, Right [[id number]] Collected:  10/26/19 1822    Lab Status:  Final result Specimen:  Blood from Hand, Right Updated:  10/31/19 2000     Blood Culture No growth at 5 days    Body Fluid Culture - Body Fluid, Kidney, Right [[id number]]  (Abnormal)  (Susceptibility) Collected:  10/29/19 1057    Lab Status:  Final result Specimen:  Body Fluid from Kidney, Right Updated:  10/31/19 0624     Body Fluid Culture Scant growth (1+) Klebsiella pneumoniae ssp pneumoniae     Gram Stain Moderate (3+) WBCs seen      No organisms seen    Susceptibility      Klebsiella pneumoniae ssp pneumoniae     EYAD     Ampicillin Resistant     Ampicillin + Sulbactam Susceptible     Cefazolin Susceptible     Cefepime Susceptible     Ceftazidime Susceptible     Ceftriaxone Susceptible     Gentamicin Susceptible     Levofloxacin Susceptible     Piperacillin + Tazobactam Susceptible     Trimethoprim + Sulfamethoxazole Susceptible                    Clostridium Difficile Toxin - Stool, Per Rectum [[id number]] Collected:  10/30/19 0507    Lab Status:  Final result Specimen:  Stool from Per Rectum Updated:  10/30/19 0814    Narrative:       The following orders were  created for panel order Clostridium Difficile Toxin - Stool, Per Rectum.  Procedure                               Abnormality         Status                     ---------                               -----------         ------                     Clostridium Difficile To...[[id number]]  Normal              Final result                 Please view results for these tests on the individual orders.    Clostridium Difficile Toxin, PCR - Stool, Per Rectum [[id number]]  (Normal) Collected:  10/30/19 0507    Lab Status:  Final result Specimen:  Stool from Per Rectum Updated:  10/30/19 0826     C. Difficile Toxins by PCR Not Detected    Narrative:       Performance characteristics of test not established for patients <2 years of age.  Negative for Toxigenic C. Difficile    Urine Culture - Urine, Urine, Clean Catch [[id number]]  (Abnormal) Collected:  10/28/19 1341    Lab Status:  Final result Specimen:  Urine, Clean Catch Updated:  10/29/19 1033     Urine Culture Yeast isolated          Imaging Results (Last 24 Hours)     ** No results found for the last 24 hours. **          Results for orders placed during the hospital encounter of 05/18/19   Adult Transthoracic Echo Complete W/ Cont if Necessary Per Protocol    Narrative · Estimated EF = 60%.  · Mild mitral valve regurgitation is present  · Mild tricuspid valve regurgitation is present.  · Calculated right ventricular systolic pressure from tricuspid   regurgitation is 29 mmHg.          I have reviewed the medications:  Scheduled Meds:    amLODIPine 10 mg Oral Q24H   aspirin 81 mg Oral Daily   DULoxetine 60 mg Oral Daily   fluconazole 200 mg Intravenous Daily   heparin (porcine) 5,000 Units Subcutaneous Q8H   hydrALAZINE 25 mg Oral Q8H   insulin detemir 22 Units Subcutaneous Q12H   insulin lispro 0-7 Units Subcutaneous 4x Daily With Meals & Nightly   insulin lispro 5 Units Subcutaneous TID With Meals   metoprolol tartrate 100 mg Oral Q12H   nystatin  Topical BID    piperacillin-tazobactam 3.375 g Intravenous Q8H   saccharomyces boulardii 250 mg Oral BID   sevelamer 800 mg Oral TID With Meals   sodium chloride 10 mL Intravenous Q12H   terazosin 5 mg Oral Nightly     Continuous Infusions:     PRN Meds:.•  acetaminophen **OR** acetaminophen **OR** acetaminophen  •  dextrose  •  dextrose  •  glucagon (human recombinant)  •  magnesium sulfate **OR** magnesium sulfate **OR** magnesium sulfate  •  ondansetron  •  oxyCODONE-acetaminophen  •  potassium chloride **OR** potassium chloride **OR** potassium chloride  •  Insert peripheral IV **AND** sodium chloride  •  sodium chloride      Assessment/Plan   Assessment / Plan     Active Hospital Problems    Diagnosis  POA   • Pneumaturia [R39.89]  Clinically Undetermined   • Perinephric abscess [N15.1]  Yes   • Peripheral vascular disease (CMS/HCC) [I73.9]  Yes   • S/P BKA (below knee amputation), left (CMS/HCC) [Z89.512]  Not Applicable   • Diabetic ulcer of right lower leg (CMS/LTAC, located within [facility]) [E11.622, L97.919]  Yes   • Type 2 diabetes mellitus with circulatory disorder and uncontrolled [E11.59]  Yes   • Hyperlipemia [E78.5]  Yes   • Essential hypertension [I10]  Yes      Resolved Hospital Problems   No resolved problems to display.        Brief Hospital Course to date:  Kendrick Crystal is a 49 y.o. male admitted with acute sepsis secondary to recurrent klebsiella bacteremia with perinephric abscess, status post perinephric drain (inadvertently removed on 11/4/2019).  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  His VCUG was normal on 11/8/2019.  Dr. Gutierres with urology is following.    Sepsis secondary to perinephric abscess (appears resolving)  -klebsiella grew from culture, continue zosyn and diflucan (Diflucan to stop on 11/10)  -Dr. Dong follows  -Consulted Urology Dr Gutierres, perinephric drain inadvertently removed on 11/4/2019, however no evidence of abscess  recurrence thus far  -Plan to continue to monitor, repeat CT if any clinical decline as discussed with infectious disease  -Abdominal ultrasound was reassuring on 11/7/2019  -Infectious disease Dr. Dong to guide when and if repeat CT imaging will be done     Abdominal distention with history of Johnston cirrhosis and ascites  - ultrasound of the abdomen on 11/7/2019 was reassuring and did not show any signs of ascites or hydronephrosis    Pneumaturia  -Discussed with Dr. Gutierres  -Requested consult with Dr. Payne who repaired his fistula in the past  -VCUG on 11/8/2019 was essentially normal    Left BKA  -Continue PT OT, pending referrals for rehab    Hypomag  -replace per protocol     Diabetes mellitus, poorly controlled  -continue basal/bolus, adjust as needed.   -Glucose range 102-179    Hypertension  -Continue amlodipine and metoprolol  -Hytrin 5 mg HS     Hx of C-diff  -loose stool x3 on 11/10/2019, will monitor closely     Depression  -continue cymbalta 30 mg daily     Wound to the right ankle  -wound care     Generalized weakness  -encouraged participation with PT     Medical Non-compliance  -complicates care    Chronic generalized musculoskeletal pain  -He is followed by outpatient pain management and gets a prescription for Percocet monthly  -Since he has been off the Percocet for several weeks now I have resumed at a lower dose on 11/8/2019 and encouraged him to try plain Tylenol instead.    DVT Prophylaxis: Heparin      CODE STATUS:   Code Status and Medical Interventions:   Ordered at: 10/26/19 2039     Level Of Support Discussed With:    Patient     Code Status:    CPR     Medical Interventions (Level of Support Prior to Arrest):    Full         Electronically signed by Jerri Forbes MD, 11/10/19, 8:52 AM.

## 2019-11-11 LAB
GLUCOSE BLDC GLUCOMTR-MCNC: 173 MG/DL (ref 70–130)
GLUCOSE BLDC GLUCOMTR-MCNC: 176 MG/DL (ref 70–130)
GLUCOSE BLDC GLUCOMTR-MCNC: 201 MG/DL (ref 70–130)
GLUCOSE BLDC GLUCOMTR-MCNC: 205 MG/DL (ref 70–130)

## 2019-11-11 PROCEDURE — 99232 SBSQ HOSP IP/OBS MODERATE 35: CPT | Performed by: HOSPITALIST

## 2019-11-11 PROCEDURE — 25010000002 HEPARIN (PORCINE) PER 1000 UNITS: Performed by: NURSE PRACTITIONER

## 2019-11-11 PROCEDURE — 25010000002 PIPERACILLIN SOD-TAZOBACTAM PER 1 G: Performed by: INTERNAL MEDICINE

## 2019-11-11 PROCEDURE — 82962 GLUCOSE BLOOD TEST: CPT

## 2019-11-11 PROCEDURE — 63710000001 INSULIN DETEMIR PER 5 UNITS: Performed by: INTERNAL MEDICINE

## 2019-11-11 PROCEDURE — 97110 THERAPEUTIC EXERCISES: CPT | Performed by: OCCUPATIONAL THERAPIST

## 2019-11-11 PROCEDURE — 63710000001 INSULIN LISPRO (HUMAN) PER 5 UNITS: Performed by: NURSE PRACTITIONER

## 2019-11-11 RX ADMIN — TAZOBACTAM SODIUM AND PIPERACILLIN SODIUM 3.38 G: 375; 3 INJECTION, SOLUTION INTRAVENOUS at 13:02

## 2019-11-11 RX ADMIN — HYDRALAZINE HYDROCHLORIDE 25 MG: 25 TABLET, FILM COATED ORAL at 21:54

## 2019-11-11 RX ADMIN — INSULIN DETEMIR 22 UNITS: 100 INJECTION, SOLUTION SUBCUTANEOUS at 08:56

## 2019-11-11 RX ADMIN — TERAZOSIN HYDROCHLORIDE ANHYDROUS 5 MG: 5 CAPSULE ORAL at 21:54

## 2019-11-11 RX ADMIN — INSULIN LISPRO 2 UNITS: 100 INJECTION, SOLUTION INTRAVENOUS; SUBCUTANEOUS at 08:54

## 2019-11-11 RX ADMIN — HEPARIN SODIUM 5000 UNITS: 5000 INJECTION INTRAVENOUS; SUBCUTANEOUS at 14:27

## 2019-11-11 RX ADMIN — INSULIN LISPRO 5 UNITS: 100 INJECTION, SOLUTION INTRAVENOUS; SUBCUTANEOUS at 08:55

## 2019-11-11 RX ADMIN — INSULIN LISPRO 2 UNITS: 100 INJECTION, SOLUTION INTRAVENOUS; SUBCUTANEOUS at 12:58

## 2019-11-11 RX ADMIN — INSULIN DETEMIR 22 UNITS: 100 INJECTION, SOLUTION SUBCUTANEOUS at 22:06

## 2019-11-11 RX ADMIN — HYDRALAZINE HYDROCHLORIDE 25 MG: 25 TABLET, FILM COATED ORAL at 07:54

## 2019-11-11 RX ADMIN — DULOXETINE HYDROCHLORIDE 60 MG: 60 CAPSULE, DELAYED RELEASE ORAL at 08:57

## 2019-11-11 RX ADMIN — ASPIRIN 81 MG: 81 TABLET, COATED ORAL at 08:57

## 2019-11-11 RX ADMIN — Medication 250 MG: at 21:54

## 2019-11-11 RX ADMIN — SODIUM CHLORIDE, PRESERVATIVE FREE 10 ML: 5 INJECTION INTRAVENOUS at 22:07

## 2019-11-11 RX ADMIN — HEPARIN SODIUM 5000 UNITS: 5000 INJECTION INTRAVENOUS; SUBCUTANEOUS at 22:07

## 2019-11-11 RX ADMIN — HEPARIN SODIUM 5000 UNITS: 5000 INJECTION INTRAVENOUS; SUBCUTANEOUS at 07:54

## 2019-11-11 RX ADMIN — AMLODIPINE BESYLATE 10 MG: 10 TABLET ORAL at 08:57

## 2019-11-11 RX ADMIN — NYSTATIN: 100000 CREAM TOPICAL at 21:56

## 2019-11-11 RX ADMIN — NYSTATIN: 100000 CREAM TOPICAL at 09:02

## 2019-11-11 RX ADMIN — HYDRALAZINE HYDROCHLORIDE 25 MG: 25 TABLET, FILM COATED ORAL at 14:27

## 2019-11-11 RX ADMIN — TAZOBACTAM SODIUM AND PIPERACILLIN SODIUM 3.38 G: 375; 3 INJECTION, SOLUTION INTRAVENOUS at 01:48

## 2019-11-11 RX ADMIN — INSULIN LISPRO 3 UNITS: 100 INJECTION, SOLUTION INTRAVENOUS; SUBCUTANEOUS at 18:12

## 2019-11-11 RX ADMIN — METOPROLOL TARTRATE 100 MG: 100 TABLET ORAL at 21:54

## 2019-11-11 RX ADMIN — INSULIN LISPRO 5 UNITS: 100 INJECTION, SOLUTION INTRAVENOUS; SUBCUTANEOUS at 12:57

## 2019-11-11 RX ADMIN — TAZOBACTAM SODIUM AND PIPERACILLIN SODIUM 3.38 G: 375; 3 INJECTION, SOLUTION INTRAVENOUS at 18:10

## 2019-11-11 RX ADMIN — METOPROLOL TARTRATE 100 MG: 100 TABLET ORAL at 08:57

## 2019-11-11 RX ADMIN — INSULIN LISPRO 5 UNITS: 100 INJECTION, SOLUTION INTRAVENOUS; SUBCUTANEOUS at 18:10

## 2019-11-11 RX ADMIN — SODIUM CHLORIDE, PRESERVATIVE FREE 10 ML: 5 INJECTION INTRAVENOUS at 09:02

## 2019-11-11 RX ADMIN — INSULIN LISPRO 3 UNITS: 100 INJECTION, SOLUTION INTRAVENOUS; SUBCUTANEOUS at 21:55

## 2019-11-11 RX ADMIN — Medication 250 MG: at 08:57

## 2019-11-11 NOTE — PROGRESS NOTES
[facility] Progress Note        Antibiotics:  Anti-Infectives (From admission, onward)    Ordered     Dose/Rate Route Frequency Start Stop    19 0848  fluconazole (DIFLUCAN) IVPB 200 mg     Shauna Sanford RN reviewed the order on 19 1844.   Ordering Provider:  Usman Dong MD    200 mg  100 mL/hr over 60 Minutes Intravenous Daily 19 0945 11/10/19 1133    10/30/19 0725  fluconazole (DIFLUCAN) IVPB 200 mg     Ordering Provider:  Usman Dong MD    200 mg  over 60 Minutes Intravenous Daily 10/30/19 0900 19 1028    10/28/19 0743  piperacillin-tazobactam (ZOSYN) 3.375 g in iso-osmotic dextrose 50 ml (premix)     Usman Dong MD reviewed the order on 19 1034.   Ordering Provider:  Usman Dong MD    3.375 g  over 4 Hours Intravenous Every 8 Hours 10/28/19 1700 19 1659    10/28/19 0743  piperacillin-tazobactam (ZOSYN) 3.375 g in iso-osmotic dextrose 50 ml (premix)     Ordering Provider:  Usman Dong MD    3.375 g  over 30 Minutes Intravenous Once 10/28/19 1100 10/28/19 1303    10/26/19 2040  meropenem (MERREM) 1 g/100 mL 0.9% NS VTB (mbp)     Ordering Provider:  Susan Infante APRN    1 g  over 30 Minutes Intravenous Once 10/26/19 2130 10/26/19 2241          CC: fatigue    HPI:    Patient is a 49 y.o.  Yr old male with history of poorly contolled T2DM, DUNLAP/liver cirrhosis, HTN, PVD/Left BKA, and multiple skin abscesses/MRSA who presented to [facility] ED with right shin wound infection summer 2018.  He was unable to get to see Dr. Martinez as his father passed away unexpectedly on 18 and he had to wait until after the .  The wound worsened becoming gangrenous and he came to [facility] ED in 2018.  He also had been hospitalized at  and then transferred to  for a severe penis/scrotum infection requiring surgical debridement in summer 2018.  He received antibiotics regarding his right leg infection, generally improved  over the remainder of summer 2018 but that area had not completely healed. He was admitted April 24, 2019 with acute left lower abdominal wall redness/swelling and pain, spontaneous drainage associated with fever/chills and uncontrolled blood sugars.  4/26 I&D requiring wide debridement , severe/deep infection and transferred to [facility] on May 3 with IV Zosyn/oral doxycycline. Cultures had lactobacillus and mixed gram-positive skin sherly including alpha strep, staph epidermidis and corynebacterium. Readmitted to [facility] May 18, 2019, increasing generalized edema ultimately transitioned to oral doxycycline and healed.     He presented to the emergency room July 30, 2019 with acute rectal pain that had begun 7/27; this is associated with constipation for days, chills and nausea/dry heaving.  White blood cell count elevated, high hemoglobin A1c over 13, urinalysis with hematuria/pyuria and CT scan with 3 x 3 cm fluid collection anterior to the distal rectum and per discussion with Dr. Akbar/Jennifer, this is not in the prostate.  Colorectal surgery/urology saw him for consideration of surgical options/timing/threshold, etc..     8/2/19 I&Dper Dr Payne perirectal abscess; patient reports that he had instrumented his rectum prior to hospitalization with enema     8/3/19 urinary retention overnight requiring in/out catheterization per patient.  Creat climb     8/4/19 further creat climb; childs placed and lisinopril stopped and d/w Dr Rubio;  ?obstruction initially associated with retention postop (1200 out with I/O cath postop per nursing) -v- contrast from CT -v- lisinopril/medication/vancomycin -v- relative hypotension -v- likely combination of factors with ATN; nephrology following; vancomycin trough high and vancomycin stopped empirically 8/3 although high trough potentially accumulation as a consequence of diminishing renal function associated with retention/contrast/pressure rather than cause.  " Nonetheless, avoid for now; creatinine trending down.        8/7 in retrospect he remembers air in his urine at admit which has raised concern for possible fistula from urinary tract;  No fecaluria and culture from abscess is fecal sherly;  ?rectal-urethral fistula;  Dr Payne aware     Culture with E. coli/Klebsiella species and creatinine generally trended down, transitioned to oral antibiotics at discharge.     Readmitted August 17, 2019 with nausea/vomiting/dry heaves for 3 days.  Walker catheter was placed and CT scan of the abdomen showed:      \"Previously seen fluid collection identified inferior to the prostate gland is more increased in density on today's examination. There is wall thickening again seen throughout the bladder. Findings again are concerning for cystitis.\" per radiology     He was transitioned to oral omnicef/topical antifungal on approx 8/23/19; Noncompliant with f/u in our office     READMITTED 10/8/19 RLQ abd pain, urinary retention, nausea, dysuria and generalized fatigue     UTI by U/A, subsequent blood cultures positive for  kleb sp, urine with kleb and e coli ;  Abnormal CT abd with right perinephric fluid collection, advanced cirrhosis, urinary bladder thickening.  10/9 Aspirate of perinephric fluid collection consistent with abscess/kleb and white blood cells; 10/16/19 cytoscopy with bullous change per Dr Gutierres but no fistula per him; 10/19/19 intermittent nausea, no vomiting or dry heaves this am, intermittent dry cough broadened to zosyn with ?aspiration pneumonia evolving after dry heaves/vomiting and had intermittent diarrhea, oral vancomycin added empirically with history of prior C. Difficile; improved with IV Zosyn/oral vancomycin and he refused consideration of placement.  He insisted on home, discharged October 23 and noncompliant with outpatient therapy and outpatient follow-up October 25.  He had become fatigued such that his family could not assist him out of bed and it was " recommended by my office that he return to the emergency room October 25.  He apparently refused that but did come to the emergency room October 26.     Readmitted October 26, 2019 with generalized weakness, fatigue/malaise and nausea/vomiting/diarrhea.  CT scan revealed increased size of perinephric fluid collection per radiology.     On October 28, patient had reported increased diarrhea, at least 6 episodes overnight and watery/thin but no hematochezia melena or hematemesis.  Vomiting has subsided     10/29 drain placed    11/5/19 drain inadvertently out overnight per him    11/8 voiding cystourethrogram per urology; ultrasound abdomen with no mention of abscess per radiology    11/11/19 doing ok per him;  GI habits variable, but no vomiting today; stool consistency variable and no diarrhea at present per family; no abs pain.  No dysuria hematuria or pyuria.  Denies new hesitancy urgency or flank pain.      No headache photophobia or neck stiffness.  No shortness of breath cough or hemoptysis.  No fevers chills or sweats.  No rash.  No other particular exposures     ROS:      11/11/19 No f/c/s. No vomiting. No rash. No new ADR to Abx.     Constitutional-- No Fever, chills or sweats.  Appetite diminished with generalized malaise and fatigue  Heent-- No new vision, hearing or throat complaints.  No epistaxis or oral sores.  Denies odynophagia or dysphagia.  No flashers, floaters or eye pain. No odynophagia or dysphagia. No headache, photophobia or neck stiffness.  CV-- No chest pain, palpitation or syncope  Resp-- No SOB/cough/Hemoptysis  GI-as above.  No hematochezia, melena, or hematemesis. Denies jaundice or chronic liver disease.  -- No dysuria, hematuria, or flank pain.  Denies hesitancy or flank pain.  Lymph- no swollen lymph nodes in neck/axilla or groin.   Heme- No active bruising or bleeding; no Hx of DVT or PE.  MS-- no swelling or pain in the bones or joints of arms/legs.  No new back pain.  Neuro-- No  "acute focal weakness or numbness in the arms or legs.  No seizures.     Full 12 point review of systems reviewed and negative otherwise for acute complaints, except for above      PE:   /83 (BP Location: Left arm, Patient Position: Lying)   Pulse 94   Temp 98 °F (36.7 °C) (Oral)   Resp 20   Ht 190.5 cm (75\")   Wt 123 kg (271 lb 8 oz)   SpO2 95%   BMI 33.94 kg/m²     GENERAL: Awake and alert, in no acute distress.   HEENT: Normocephalic, atraumatic.  PERRL. EOMI. No conjunctival injection. No icterus. Oropharynx clear without evidence of thrush or exudate. No evidence of peridontal disease.    NECK: Supple without nuchal rigidity. No mass.  LYMPH: No cervical, axillary or inguinal lymphadenopathy.  HEART: RRR; No murmur, rubs, gallops.   LUNGS: Diminished at bases to auscultation bilaterally with slight rhonchi bilateral but no wheezing or rales. Normal respiratory effort. Nonlabored. No dullness.  ABDOMEN: Soft, nontender, nondistended. Positive bowel sounds. No rebound or guarding. NO mass or HSM.  EXT:  No cyanosis, clubbing or edema. No cord.  :  erythema/sattelite lesions lesions  MSK: FROM without joint effusions noted arms/legs.    SKIN: Warm and dry without cutaneous eruptions on Inspection/palpation.    NEURO: Oriented to PPT. No focal deficits on motor/sensory exam at arms/legs.     No peripheral stigmata/phenomena of endocarditis     PICC line without obvious redness or drainage     Laboratory Data    Results from last 7 days   Lab Units 11/10/19  0904 11/07/19  0621 11/06/19  0813   WBC 10*3/mm3 10.59 11.73* 11.43*   HEMOGLOBIN g/dL 9.7* 9.6* 9.0*   HEMATOCRIT % 32.2* 31.3* 29.5*   PLATELETS 10*3/mm3 339 386 373     Results from last 7 days   Lab Units 11/10/19  0904   SODIUM mmol/L 140   POTASSIUM mmol/L 4.0   CHLORIDE mmol/L 104   CO2 mmol/L 25.0   BUN mg/dL 14   CREATININE mg/dL 0.59*   GLUCOSE mg/dL 202*   CALCIUM mg/dL 8.5*     Results from last 7 days   Lab Units 11/07/19  0634   ALK " PHOS U/L 224*   BILIRUBIN mg/dL <0.2*   ALT (SGPT) U/L 21   AST (SGOT) U/L 24               Estimated Creatinine Clearance: 209.3 mL/min (A) (by C-G formula based on SCr of 0.59 mg/dL (L)).      Microbiology:      Radiology:  Imaging Results (last 72 hours)     Procedure Component Value Units Date/Time    CT Abdomen Pelvis With Contrast [[id number]] Collected:  10/26/19 2035     Updated:  10/26/19 2037    Narrative:       CT ABDOMEN AND PELVIS WITH CONTRAST, 10/26/2019    HISTORY:  49-year-old male with recent history of a right perinephric fluid collection that underwent diagnostic needle aspiration 10/9/2019. He has been on IV antibiotic therapy for possible perinephric abscess. Examination is requested today for follow-up.    TECHNIQUE:  CT imaging of the abdomen and pelvis with IV contrast. Radiation dose reduction techniques included automated exposure control. Radiation audit for CT and nuclear cardiology exams in the last 12 months: 0.    ABDOMEN FINDINGS:  Rim-enhancing pericapsular or subcapsular fluid collection along the posterior margin of the lower pole right kidney has enlarged since 10/18/2019. It previously measured about 5.7 x 3.9 cm axially and currently measures 6.0 x 4.8 cm in the same location  (see axial image 48). There is associated perinephric soft tissue stranding, most likely inflammatory. The findings are compatible with clinically suspected perinephric abscess.    Both kidneys enhance normally. There is no evidence of upper urinary tract obstruction.    Liver, pancreas and spleen are within normal limits. No bile duct or pancreatic duct dilatation.    Small bowel and colon are normal in caliber and appearance without GI contrast. Normal appendix. No gastric distention, hiatal hernia or distal esophageal dilatation.    PELVIS FINDINGS:  Urinary bladder, prostate and rectum are within normal limits.    Lung base images show a tiny right pleural effusion.      Impression:       1.  Likely  perinephric or subcapsular abscess along the posterior aspect of the lower pole right kidney. This has enlarged since 10/18/2019.  2.  Normal renal parenchymal enhancement. No CT evidence of pyelonephritis at this time. No evidence of upper urinary tract obstruction.  3.  Tiny right pleural effusion.    Signer Name: Fadi Healy MD   Signed: 10/26/2019 8:35 PM   Workstation Name: [email or web address]    [facility]            Impression:      --Acute Kleb septicemia/sepsis on treatment since last admit, likely urinary source/pyelnoephritis associated with urinary retention and  with abnormal CT scan with perinephric collection/abscess and Kleb on aspirate that has persisted as of aspirate 10/29 arguing relatively sequestered focus not penetrated by systemic abx and now with drain placed 10/29;  IV  Zosyn ongoing; urology to determine any timing/option or threshold for further intervention such as surgical debridement if not responsive to drain/abx, or other functional/anatomic assessment.  DRAIN INADVERTENTLY OUT 11/4;  Monitor clinically,   D/w Dr Forbes; ultrasound done on November 8 with no mention of abscess by radiology.  Sought  radiology clarification as to whether this modality is good enough in comparison to prior CT scans to know whether abscess is better/gone versus need for different modality such as repeat CT scan to better clarify. I d/w Dr Escobedo 11/11 and he reviewed the US from 11/8 and he reports to me US is adequate for followup on this and NO current evidence for abscess     --Acute perinephric abscess as above;   Urology following to help guide timing/option/threshold for further drainage (perc -v- other);  Perc drain 10/29 and kleb persisted in culture; see above regarding repeat/recent scans    --urinary frequency with cutaneous candidiasis and candiduria/pyruria, improved;  S/p diflucan     --abnormal imaging with dry cough and probable pneumonia last admission;  At  risk for aspiration with recent vomiting/dry heaves and empiric zosyn started 10/19; changed to Invanz 10/23 to help facilitate home therapy with mom and changed back to Zosyn October 28;   no productive cough at present, but if it develops, then send for culture     --acute  diarrhea 10/27-10/28 with Hx CDiff per him/family and recent empiric oral vancomycin, CDT negative and oral vancomycin stopped;  If recurrent diarrhea then consider more wrolup or empiric therapy     --Hx perirectal abscess ; Colorectal surgery, Dr. Payne previously saw and is following.  Drainage as above on August 2 with mxed Fecal sherly in culture; CT scans  with no mention of abscess on 8/18 study;   any timing/option or threshold for further GI/colorectal work-up per Dr payne/CRS     --History urinary retention.  urology following     --Hx ARF in August 2019;  ?obstruction initially associated with retention postop (1200 out with I/O cath postop per nursing) -v- contrast from CT -v- lisinopril/medication/vancomycin -v- relative hypotension -v- likely combination of factors with ATN;  vancomycin trough high and vancomycin stopped empirically 8/3 although high trough potentially accumulation as a consequence of diminishing renal function associated with retention/contrast/pressure rather than cause.  Nonetheless, avoiding for now; likely further acute kidney injury at admission August 17 associated with dehydration/prerenal/ATN etiology     --History MRSA;  Not in current culture     --Diabetes mellitus type 2, uncontrolled.  He reports the reason for this is forgetfulness in past     --History [place] and chronic liver disease/cirrhosis  per past notes     --Left BKA     --Peripheral vascular disease     --medical noncompliance and inability to care for self at home.       PLAN:      --IV Zosyn; s/p  IV diflucan      --Check/review labs cultures and scans     --Discussed with microbiology     --History per nursing staff      --Discussed with  family, partial history per them     --Highly complex set of issues with high risk for further serious morbidity and other serious sequela     --Colorectal surgery and urology have followed; urology following to determine timing/option or threshold for further percutaneous aspiration/drainage versus other surgery regarding  Abscess and to guide further workup for air in urine     --D/w Dr Escobedo as above          Usman Dong MD  11/11/2019

## 2019-11-11 NOTE — CONSULTS
Chart reviewed  Office op note by Dr Payne reviewed    3+ month history of pneumouria / air in the urine    History of abscess in the deep pelvis drained by Dr Payne 8/2/19    Present difficulties include right perinephric abscess.    Physical exam-   Soft abdomen  Digital rectal exam without fullness or mass/ no evidence of un drained abscess.    Plans for antibiotic +/- percutaneous drainage at the discretion of Urology.    No immediate plans for further intervention such as colonoscopy.  Covering for Dr Payne  Please let me know if questions arise this week.

## 2019-11-11 NOTE — PROGRESS NOTES
[facility] Medicine Services  PROGRESS NOTE    Patient Name: Kendrick Crystal  : 1970  MRN: [id number]    Date of Admission: 10/26/2019  Primary Care Physician: Provider, No Known    Subjective   Subjective     CC:  weakness    HPI:  Suspected sleep apnea, but never had sleep study.  Wife in room.   Seen him before.  Presented with weakness and diagnosed with abscess.   Hoping for [facility].      Review of Systems    Gen- No fevers, chills  CV- No chest pain, palpitations  Resp- No cough, dyspnea  GI- No N/V/D, abd pain    Objective   Objective     Vital Signs:   Temp:  [97.3 °F (36.3 °C)-98.2 °F (36.8 °C)] 97.3 °F (36.3 °C)  Heart Rate:  [83-94] 83  Resp:  [16-20] 18  BP: (154-168)/(83-99) 168/97        Physical Exam:    Constitutional: No acute distress, awake, alert  HENT: NCAT, mucous membranes moist  Respiratory: Clear to auscultation bilaterally, respiratory effort normal   Cardiovascular: RRR, s1 and s2  Gastrointestinal: Positive bowel sounds, soft, nontender, nondistended  Musculoskeletal: No bilateral ankle edema  Psychiatric: Appropriate affect, cooperative  Neurologic: Oriented x 3, strength symmetric in all extremities, Cranial Nerves grossly intact to confrontation, speech clear  Skin: No rashes      Results Reviewed:    Results from last 7 days   Lab Units 11/10/19  0904 19  0621 19  0813   WBC 10*3/mm3 10.59 11.73* 11.43*   HEMOGLOBIN g/dL 9.7* 9.6* 9.0*   HEMATOCRIT % 32.2* 31.3* 29.5*   PLATELETS 10*3/mm3 339 386 373     Results from last 7 days   Lab Units 11/10/19  0904 19  0634 19  0813   SODIUM mmol/L 140 136 135*   POTASSIUM mmol/L 4.0 3.8 4.1   CHLORIDE mmol/L 104 98 102   CO2 mmol/L 25.0 25.0 23.0   BUN mg/dL 14 10 10   CREATININE mg/dL 0.59* 0.62* 0.50*   GLUCOSE mg/dL 202* 186* 226*   CALCIUM mg/dL 8.5* 8.5* 8.1*   ALT (SGPT) U/L  --  21  --    AST (SGOT) U/L  --  24  --      Estimated Creatinine Clearance: 209.3 mL/min (A) (by  C-G formula based on SCr of 0.59 mg/dL (L)).    Microbiology Results Abnormal     Procedure Component Value - Date/Time    Ova & Parasite Examination - Stool, Per Rectum [[id number]] Collected:  10/30/19 0507    Lab Status:  Final result Specimen:  Stool from Per Rectum Updated:  11/05/19 1353     Ova + Parasite Exam No ova or parasites seen on concentrated smear     Trichrome Stain No ova or parasites seen on trichrome stain    Stool Culture (Reference Lab) - Stool, Per Rectum [[id number]] Collected:  10/30/19 0507    Lab Status:  Final result Specimen:  Stool from Per Rectum Updated:  11/04/19 1708     Salmonella/Shigella Screen Final report     Result 1 Comment     Comment: No Salmonella or Shigella recovered.        Campylobacter Culture Final report     Result 1 Comment     Comment: No Campylobacter species isolated.        E coli, Shiga toxin Assay Negative    Narrative:       Performed at:  [address] [place] [address]  [id number]  : Dl Laird PhD, Phone:  [phone]    Gastrointestinal Panel, PCR ([facility]) - Stool, Per Rectum [[id number]] Collected:  10/30/19 0507    Lab Status:  Final result Specimen:  Stool from Per Rectum Updated:  11/01/19 1357     Reference Lab Report See Attached Report     Comment: See scanned report       Blood Culture - Blood, Wrist, Right [[id number]] Collected:  10/26/19 1805    Lab Status:  Final result Specimen:  Blood from Wrist, Right Updated:  10/31/19 2000     Blood Culture No growth at 5 days    Blood Culture - Blood, Hand, Right [[id number]] Collected:  10/26/19 1822    Lab Status:  Final result Specimen:  Blood from Hand, Right Updated:  10/31/19 2000     Blood Culture No growth at 5 days    Body Fluid Culture - Body Fluid, Kidney, Right [[id number]]  (Abnormal)  (Susceptibility) Collected:  10/29/19 1057    Lab Status:  Final result Specimen:  Body Fluid from Kidney, Right Updated:  10/31/19 0624     Body Fluid Culture Scant growth  (1+) Klebsiella pneumoniae ssp pneumoniae     Gram Stain Moderate (3+) WBCs seen      No organisms seen    Susceptibility      Klebsiella pneumoniae ssp pneumoniae     EYAD     Ampicillin Resistant     Ampicillin + Sulbactam Susceptible     Cefazolin Susceptible     Cefepime Susceptible     Ceftazidime Susceptible     Ceftriaxone Susceptible     Gentamicin Susceptible     Levofloxacin Susceptible     Piperacillin + Tazobactam Susceptible     Trimethoprim + Sulfamethoxazole Susceptible                    Clostridium Difficile Toxin - Stool, Per Rectum [[id number]] Collected:  10/30/19 0507    Lab Status:  Final result Specimen:  Stool from Per Rectum Updated:  10/30/19 0826    Narrative:       The following orders were created for panel order Clostridium Difficile Toxin - Stool, Per Rectum.  Procedure                               Abnormality         Status                     ---------                               -----------         ------                     Clostridium Difficile To...[[id number]]  Normal              Final result                 Please view results for these tests on the individual orders.    Clostridium Difficile Toxin, PCR - Stool, Per Rectum [[id number]]  (Normal) Collected:  10/30/19 0507    Lab Status:  Final result Specimen:  Stool from Per Rectum Updated:  10/30/19 0826     C. Difficile Toxins by PCR Not Detected    Narrative:       Performance characteristics of test not established for patients <2 years of age.  Negative for Toxigenic C. Difficile    Urine Culture - Urine, Urine, Clean Catch [[id number]]  (Abnormal) Collected:  10/28/19 1341    Lab Status:  Final result Specimen:  Urine, Clean Catch Updated:  10/29/19 1033     Urine Culture Yeast isolated          Imaging Results (Last 24 Hours)     Procedure Component Value Units Date/Time    FL Voiding Urethrocystogram [[id number]] Collected:  11/08/19 1632     Updated:  11/10/19 1801    Narrative:       EXAMINATION: FL VOIDING  URETHROCYSTOGRAM-     INDICATION: pneumaturia in pt with previous claudia-scrotal abscess.   Suspect urethral source.  cysto neg.; N15.1-Renal and perinephric  abscess; Z86.19-Personal history of other infectious and parasitic  diseases; Z87.898-Personal history of other specified conditions;  E11.51-Type 2 diabetes mellitus with diabetic peripheral angiopathy  without gangrene; E11.65-Type 2 diabetes mellitus with hyperglycemia;  I10-Ess     TECHNIQUE: 2 minutes and 12 seconds of fluoroscopic time was used for  this exam. 5 associated images were saved.  imaging reveals a  nonobstructive bowel gas pattern. The patient arrived at the fluoroscopy  suite with a Walker catheter in situ. A single contrast study using  Cysto-Conray contrast media was performed. The bladder was filled in a  retrograde fashion.     COMPARISON: NONE     FINDINGS: Contrast was seen filling the bladder. The bladder mucosa  appeared grossly normal. No fixed filling defects were seen.  Vesicoureteral reflux was not noted during this exam. No extravasation  of contrast was seen. The urethra appeared within normal limits on  voiding images. No strictures, or mucosal irregularities of the urethra  were seen.          Impression:       Fluoroscopic guided voiding cystourethrogram series appeared  within normal limits.         This report was finalized on 11/10/2019 5:58 PM by Dr. Jb Campos.       US Abdomen Complete [[id number]] Collected:  11/08/19 0749     Updated:  11/10/19 1756    Narrative:       EXAMINATION: US ABDOMEN COMPLETE-     INDICATION: Abdominal pain and distention, history of ascites, DUNLAP  cirrhosis; N15.1-Renal and perinephric abscess; Z86.19-Personal history  of other infectious and parasitic diseases; Z87.898-Personal history of  other specified conditions; E11.51-Type 2 diabetes mellitus with  diabetic peripheral angiopathy without gangrene; E11.65-Type 2 diabetes  mellitus with hyperglycemia; I10-Essential (primary)  hypertension.      TECHNIQUE: Ultrasound abdomen complete.     COMPARISON: None.     FINDINGS: Visualized portions of the aorta and IVC are grossly  unremarkable.     Visualized portions of the pancreas within normal limits.     Liver demonstrates coarsened echotexture without focal parenchymal  lesion.     Gallbladder is present without cholelithiasis, gallbladder wall  thickening or pericholecystic fluid demonstrating minimal debris in the  dependent portion.     Common bile duct measures 2 mm in diameter at the level of the mimi  hepatis.     Right kidney measures 11 cm in length without evidence of  hydronephrosis, contour deforming mass or obvious calculi.     Left kidney measures 12 cm in length without evidence of hydronephrosis,  contour deforming mass or obvious calculi.     Spleen measures 15 cm enlarged in size with a single 1.4 cm hypoechoic  area of likely simple cyst associated.     No ascites.       Impression:       1. Mildly coarsened echotexture of the hepatic parenchyma may represent  hepatic parenchymal process. No focal liver lesion.  2. Splenomegaly measuring up to 15 cm in craniocaudal dimension.  3. No ascites.      D:  11/08/2019  E:  11/08/2019     This report was finalized on 11/10/2019 5:53 PM by Dr. Jb Campos.             Results for orders placed during the hospital encounter of 05/18/19   Adult Transthoracic Echo Complete W/ Cont if Necessary Per Protocol    Narrative · Estimated EF = 60%.  · Mild mitral valve regurgitation is present  · Mild tricuspid valve regurgitation is present.  · Calculated right ventricular systolic pressure from tricuspid   regurgitation is 29 mmHg.          I have reviewed the medications:  Scheduled Meds:  amLODIPine 10 mg Oral Q24H   aspirin 81 mg Oral Daily   DULoxetine 60 mg Oral Daily   heparin (porcine) 5,000 Units Subcutaneous Q8H   hydrALAZINE 25 mg Oral Q8H   insulin detemir 22 Units Subcutaneous Q12H   insulin lispro 0-7 Units Subcutaneous 4x  Daily With Meals & Nightly   insulin lispro 5 Units Subcutaneous TID With Meals   metoprolol tartrate 100 mg Oral Q12H   nystatin  Topical BID   piperacillin-tazobactam 3.375 g Intravenous Q8H   saccharomyces boulardii 250 mg Oral BID   sodium chloride 10 mL Intravenous Q12H   terazosin 5 mg Oral Nightly     Continuous Infusions:   PRN Meds:.•  acetaminophen **OR** acetaminophen **OR** acetaminophen  •  dextrose  •  dextrose  •  glucagon (human recombinant)  •  loperamide  •  magnesium sulfate **OR** magnesium sulfate **OR** magnesium sulfate  •  ondansetron  •  oxyCODONE-acetaminophen  •  potassium chloride **OR** potassium chloride **OR** potassium chloride  •  Insert peripheral IV **AND** sodium chloride  •  sodium chloride      Assessment/Plan   Assessment / Plan     Active Hospital Problems    Diagnosis  POA   • Pneumaturia [R39.89]  Clinically Undetermined   • Perinephric abscess [N15.1]  Yes   • Peripheral vascular disease (CMS/[facility]) [I73.9]  Yes   • S/P BKA (below knee amputation), left (CMS/[facility]) [Z89.512]  Not Applicable   • Diabetic ulcer of right lower leg (CMS/[facility]) [E11.622, L97.919]  Yes   • Type 2 diabetes mellitus with circulatory disorder and uncontrolled [E11.59]  Yes   • Hyperlipemia [E78.5]  Yes   • Essential hypertension [I10]  Yes      Resolved Hospital Problems   No resolved problems to display.        Brief Hospital Course to date:  Kendrick Crystal is a 49 y.o. male who presented to the ER with weakness.  Discharged 10/23 - admitted again on 10/26    High Risk for Re-admission  - discharged on 10/23 - readmit 10/26  - high LACE score  - history of compliance issues  Perinephric Abscess  - Urology following  - CRS  Uncontrolled Diabetes  - long history of poorly controlled DM  - mostly A1Cs >13  - high sugars for over 2 yrs, likely longer  Obesity  - suspected undiagnosed sleep apnea  Depression  - continue Cymbalta 60 mg daily  Debility  - s/p Below Knee Amputation  - immobility  Hypertension  -  on Norvasc, hydralazine, metoprolol, terazosin      High Risk Patient for poor outcome  Unclear if he has primary care, multiple admissions here    DVT Prophylaxis:  Heparin 5,000 Units SC every 8 hours    Disposition: I expect the patient to be discharged TBD    CODE STATUS:   Code Status and Medical Interventions:   Ordered at: 10/26/19 2039     Level Of Support Discussed With:    Patient     Code Status:    CPR     Medical Interventions (Level of Support Prior to Arrest):    Full         Electronically signed by Cody Naqvi MD, 11/11/19, 2:15 PM.

## 2019-11-11 NOTE — PROGRESS NOTES
Continued Stay Note  [facility]     Patient Name: Kendrick Crystal  MRN: [id number]  Today's Date: 11/11/2019    Admit Date: 10/26/2019    Discharge Plan     Row Name 11/11/19 1456       Plan    Plan  Home with [facility]    Patient/Family in Agreement with Plan  yes    Plan Comments  Spoke to patient at bedside. Encouraged him to work with PT. He is declining to work with PT. CM explained that his only option with refusing to work with PT is home. Patient wants home with [facility]. CM will continue to follow.    Final Discharge Disposition Code  06 - home with home health care        Discharge Codes    No documentation.       Expected Discharge Date and Time     Expected Discharge Date Expected Discharge Time    Nov 12, 2019             Fadi Valentine RN

## 2019-11-11 NOTE — PROGRESS NOTES
Continued Stay Note  [facility]     Patient Name: Kendrick Crystal  MRN: [id number]  Today's Date: 11/11/2019    Admit Date: 10/26/2019    Discharge Plan     Row Name 11/11/19 0916       Plan    Plan  [facility]    Patient/Family in Agreement with Plan  yes    Plan Comments  Spoke to patient and spouse at bedside. They still want [facility]. Called Bridger at [facility] and she will look at the patient again. CM will continue to follow.    Final Discharge Disposition Code  62 - inpatient rehab facility        Discharge Codes    No documentation.       Expected Discharge Date and Time     Expected Discharge Date Expected Discharge Time    Nov 12, 2019             Fadi Valentine RN

## 2019-11-11 NOTE — THERAPY TREATMENT NOTE
Acute Care - Occupational Therapy Treatment Note  [facility]     Patient Name: Kendrick Crystal  : 1970  MRN: [id number]  Today's Date: 2019     Date of Referral to OT: 10/27/19  Referring Physician: MD Elliot    Admit Date: 10/26/2019       ICD-10-CM ICD-9-CM   1. Perinephric abscess N15.1 590.2   2. History of sepsis Z86.19 V12.09   3. History of bacteremia Z87.898 V12.09   4. DM (diabetes mellitus) type II uncontrolled, periph vascular disorder (CMS/[facility]) E11.51 250.72    E11.65 443.81   5. Essential hypertension I10 401.9   6. S/P BKA (below knee amputation), left (CMS/[facility]) Z89.512 V49.75   7. Impaired mobility and ADLs Z74.09 799.89     Patient Active Problem List   Diagnosis   • DUNLAP Cirrhosis   • Essential hypertension   • Non-compliance   • Hyperlipemia   • Hypertriglyceridemia, essential   • Sepsis affecting skin   • Type 2 diabetes mellitus with circulatory disorder and uncontrolled   • History of MRSA infection   • Diabetic ulcer of right lower leg (CMS/[facility])   • S/P BKA (below knee amputation), left (CMS/[facility])   • Peripheral vascular disease (CMS/[facility])   • DM (diabetes mellitus) type II uncontrolled, periph vascular disorder (CMS/[facility])   • Obesity (BMI 30-39.9)   • Gastroparesis   • Perinephric abscess   • Acute UTI (urinary tract infection)   • Bacteremia due to Klebsiella pneumoniae   • Pneumaturia     Past Medical History:   Diagnosis Date   • Abdominal wall cellulitis 2019   • JUAN (acute kidney injury) (CMS/[facility]) 2018   • Arthritis    • Bipolar 1 disorder (CMS/[facility])    • Cellulitis of right anterior lower leg 2018    WITH MRSA   • Cirrhosis (CMS/[facility])    • Counseling for insulin pump    • Depression    • Diabetes mellitus (CMS/[facility])    • Encephalopathy, hepatic (CMS/[facility])    • H/O degenerative disc disease    • History of Lissa's gangrene     right leg/testicle   • Hyperlipemia    • Hypertension    • multiple Skin abscesses    • DUNLAP, bx showed stage IV fibrosis    • Neuropathy   "  • Peripheral vascular disease (CMS/HCC)    • Thrombophlebitis      Past Surgical History:   Procedure Laterality Date   • ABDOMINAL WALL ABSCESS INCISION AND DRAINAGE N/A 4/26/2019    Procedure: ABDOMINAL WALL DEBRIDEMENT;  Surgeon: Fadi Kern MD;  Location:  [facility];  Service: General   • AMPUTATION DIGIT Left 1/30/2017    Procedure: left fourth and fifth transmetatarsal toe amputation ;  Surgeon: Juancho Martinez MD;  Location:  [facility];  Service:    • BELOW KNEE AMPUTATION Left 3/2/2017    Procedure: AMPUTATION BELOW KNEE, SHMUEL;  Surgeon: Juancho Martinez MD;  Location:  [facility];  Service:    • CYSTOSCOPY N/A 10/16/2019    Procedure: CYSTOSCOPY;  Surgeon: Brad Gutierres MD;  Location:  [facility];  Service: Urology   • ENDOSCOPY     • HEMORRHOIDECTOMY N/A 8/2/2019    Procedure: EXCISION AND DRAIN PERIRECTAL ABSCESS;  Surgeon: Mumtaz Payne MD;  Location:  [facility];  Service: General   • LEG SURGERY     • TESTICLE SURGERY Right     DEBRIDEMENT FROM GANGRENE   • TONSILLECTOMY  1975       Therapy Treatment    Rehabilitation Treatment Summary     Row Name 11/11/19 1010             Treatment Time/Intention    Discipline  occupational therapist  -ST      Document Type  therapy note (daily note)  -ST      Subjective Information  complains of \"I'd rather eat first\"  -ST      Mode of Treatment  individual therapy;occupational therapy  -ST      Patient/Family Observations  pt sitting UIB; wife present  -ST      Care Plan Review  care plan/treatment goals reviewed;risks/benefits reviewed;current/potential barriers reviewed;patient/other agree to care plan  -ST      Therapy Frequency (OT Eval)  3 times/wk  -ST      Patient Effort  poor  -ST      Existing Precautions/Restrictions  fall;other (see comments) L BKA, prosthesis does not fit & u/a to use  -ST      Recorded by [ST] Elis Franco OTR 11/11/19 1113      Row Name 11/11/19 1010             Cognitive Assessment/Intervention- PT/OT    Affect/Mental " Status (Cognitive)  agitated  -ST      Behavioral Issues (Cognitive)  withdrawn  -ST      Orientation Status (Cognition)  oriented x 4  -ST      Follows Commands (Cognition)  WNL  -ST      Attention Deficit (Cognitive)  mild deficit  -ST      Cognitive Assessment/Intervention Comment  requires significant encouragement for participation   -ST      Recorded by [ST] Elis Franco, OTR 11/11/19 1113      Row Name 11/11/19 1010             Safety Issues, Functional Mobility    Impairments Affecting Function (Mobility)  other (see comments)  -ST      Comment, Safety Issues/Impairments (Mobility)  participation level impacts progress  -ST      Recorded by [ST] Elis Franco OTR 11/11/19 1113      Row Name 11/11/19 1010             Bed Mobility Assessment/Treatment    Comment (Bed Mobility)  refused all attempts at bed mobility   -ST      Recorded by [ST] Elis Franco OTR 11/11/19 1113      Row Name 11/11/19 1010             Transfer Assessment/Treatment    Comment (Transfers)  states he cannot work on transfers d/t not having correct fitting prosthesis; refuses attempts w/o prosthesis bed side   -ST      Recorded by [ST] Elis Franco OTR 11/11/19 1113      Row Name 11/11/19 1010             Therapeutic Exercise    69535 - OT Therapeutic Exercise Minutes  15  -ST      Recorded by [ST] Elis Franco OTR 11/11/19 1113      Row Name 11/11/19 1010             Upper Extremity Seated Therapeutic Exercise    Performed, Seated Upper Extremity (Therapeutic Exercise)  shoulder flexion/extension;shoulder abduction/adduction;shoulder horizontal abduction/adduction;elbow flexion/extension  -ST      Exercise Type, Seated Upper Extremity (Therapeutic Exercise)  resistive exercise  -ST      Sets/Reps Detail, Seated Upper Extremity (Therapeutic Exercise)  2X15  -ST      Comment, Seated Upper Extremity (Therapeutic Exercise)  encouragement for participation   -ST      Recorded by [ST] Elis Franco, OTR  11/11/19 1113      Row Name 11/11/19 1010             Positioning and Restraints    Pre-Treatment Position  in bed  -ST      Post Treatment Position  bed  -ST      In Bed  notified nsg;supine;call light within reach;encouraged to call for assist;exit alarm on;with family/caregiver  -ST      Recorded by [ST] Elis Franco, OTR 11/11/19 1113      Row Name 11/11/19 1010             Pain Scale: Numbers Pre/Post-Treatment    Pain Scale: Numbers, Pretreatment  0/10 - no pain  -ST      Pain Scale: Numbers, Post-Treatment  0/10 - no pain  -ST      Recorded by [ST] Elis Franco, OTR 11/11/19 1113      Row Name                Wound 10/27/19 0945 Right lateral ankle Abrasion    Wound - Properties Group Date first assessed: 10/27/19 [CP] Time first assessed: 0945 [CP] Present on Hospital Admission: Y [CP] Side: Right [CP] Orientation: lateral [CP] Location: ankle [CP] Primary Wound Type: Abrasion [CP] Recorded by:  [CP] Alivia Cespedes APRN 10/27/19 1016    Row Name                Wound 11/04/19 1915 Right lateral flank Incision    Wound - Properties Group Date first assessed: 11/04/19 [LN] Time first assessed: 1915 [LN] Side: Right [LN] Orientation: lateral [LN] Location: flank [LN] Primary Wound Type: Incision [LN], from GALO drain  Recorded by:  [LN] Louann Colunga RN 11/05/19 1347      User Key  (r) = Recorded By, (t) = Taken By, (c) = Cosigned By    Initials Name Effective Dates Discipline    ST Elis Franco, OTR 06/10/18 -  OT    CP Alivia Cespedes APRN 07/24/19 -  Nurse    Louann Carbajal RN 10/16/17 -  Nurse        Wound 10/27/19 0945 Right lateral ankle Abrasion (Active)   Dressing Appearance dry;intact 11/10/2019  8:30 PM   Closure Approximated 11/10/2019  8:30 PM   Base clean;dry 11/10/2019  8:30 PM   Periwound intact;pink;blanchable 11/10/2019  8:30 PM   Periwound Temperature warm 11/10/2019  8:30 PM   Periwound Skin Turgor soft 11/10/2019  8:30 PM   Edges callused 11/10/2019  8:30 PM    Drainage Amount none 11/10/2019  8:30 PM   Dressing Care, Wound open to air 11/10/2019  8:30 PM       Wound 11/04/19 1915 Right lateral flank Incision (Active)   Dressing Appearance open to air 11/10/2019  8:30 PM   Closure KALYN 11/10/2019  6:00 PM   Base scab 11/10/2019  8:30 PM   Periwound intact;dry 11/10/2019  8:30 PM   Periwound Temperature warm 11/10/2019  8:30 PM   Periwound Skin Turgor soft 11/10/2019  8:30 PM   Drainage Amount none 11/10/2019  8:30 PM       Occupational Therapy Education     Title: PT OT SLP Therapies (In Progress)     Topic: Occupational Therapy (Done)     Point: ADL training (Done)     Description: Instruct learner(s) on proper safety adaptation and remediation techniques during self care or transfers.   Instruct in proper use of assistive devices.    Learning Progress Summary           Patient Acceptance, E,D, VU,NR by AN at 11/7/2019 10:08 AM    Comment:  Safety with ADLS; UE ex's for upper UE.    Acceptance, E,D,TB, VU,DU,NR by TB at 11/5/2019  3:35 PM    Comment:  Education reinforced for benefits of OOB/dynamic activity/therapy, role of OT and HEP to support ADLs.    Nonacceptance, E, NR by AN at 10/31/2019  3:44 PM    Comment:  Educated on importance, benefits vs consequences of getting OOB and increasing therapeutic ex/activities.    Acceptance, E,D, VU,DU by CYNDI at 10/29/2019  2:30 PM    Comment:  Pt educated on HEP.   Significant Other Acceptance, E,D, VU,NR by AN at 11/7/2019 10:08 AM    Comment:  Safety with ADLS; UE ex's for upper UE.    Acceptance, E,D,TB, VU,DU,NR by TB at 11/5/2019  3:35 PM    Comment:  Education reinforced for benefits of OOB/dynamic activity/therapy, role of OT and HEP to support ADLs.                   Point: Home exercise program (Done)     Description: Instruct learner(s) on appropriate technique for monitoring, assisting and/or progressing therapeutic exercises/activities.    Learning Progress Summary           Patient Acceptance, E,TB,D, VU,DU by   at 11/11/2019 10:10 AM    Comment:  see flow sheet    Acceptance, E,D, VU,NR by MARIA DOLORES at 11/7/2019 10:08 AM    Comment:  Safety with ADLS; UE ex's for upper UE.    Acceptance, E,D,TB, VU,DU,NR by VIVIAN at 11/5/2019  3:35 PM    Comment:  Education reinforced for benefits of OOB/dynamic activity/therapy, role of OT and HEP to support ADLs.    Acceptance, E,D, VU,DU by CYNDI at 11/1/2019  4:01 PM    Comment:  Pt educated on role of OT, safety, fall prevention, ADLsm and HEP.    Nonacceptance, E, NR by MARIA DOLORES at 10/31/2019  3:44 PM    Comment:  Educated on importance, benefits vs consequences of getting OOB and increasing therapeutic ex/activities.    Acceptance, E,D, VU,DU by CYNDI at 10/29/2019  2:30 PM    Comment:  Pt educated on HEP.   Significant Other Acceptance, E,D, VU,NR by MARIA DOLORES at 11/7/2019 10:08 AM    Comment:  Safety with ADLS; UE ex's for upper UE.    Acceptance, E,D,TB, VU,DU,NR by VIVIAN at 11/5/2019  3:35 PM    Comment:  Education reinforced for benefits of OOB/dynamic activity/therapy, role of OT and HEP to support ADLs.                   Point: Precautions (Done)     Description: Instruct learner(s) on prescribed precautions during self-care and functional transfers.    Learning Progress Summary           Patient Acceptance, E,D, VU,DU by CYNDI at 11/1/2019  4:01 PM    Comment:  Pt educated on role of OT, safety, fall prevention, ADLsm and HEP.    Acceptance, TB,E, VU,NR by AUSTIN at 11/1/2019 11:13 AM    Acceptance, E,D, VU,DU by CYNDI at 10/29/2019  2:30 PM    Comment:  Pt educated on HEP.   Significant Other Acceptance, TB,E, VU,NR by AUSTIN at 11/1/2019 11:13 AM                   Point: Body mechanics (Done)     Description: Instruct learner(s) on proper positioning and spine alignment during self-care, functional mobility activities and/or exercises.    Learning Progress Summary           Patient Acceptance, E,D, VU,DU by CYNDI at 11/1/2019  4:01 PM    Comment:  Pt educated on role of OT, safety, fall prevention, ADLsm and HEP.     Acceptance, TB,E, VU,NR by  at 11/1/2019 11:13 AM    Acceptance, JOYCE,D, VU,DU by  at 10/29/2019  2:30 PM    Comment:  Pt educated on HEP.   Significant Other Acceptance, VIVIAN,E, VU,NR by  at 11/1/2019 11:13 AM                               User Key     Initials Effective Dates Name Provider Type Discipline     06/08/18 -  Areli Nichols, OT Occupational Therapist OT     06/10/18 -  Elis Franco OTR Occupational Therapist OT    MARIA DOLORES 06/22/15 -  Fela Anguiano, OT Occupational Therapist OT    CYNDI 07/17/19 -  Roz Galvan, OT Occupational Therapist OT     10/15/19 -  Stephanie Mead RN Registered Nurse Nurse                OT Recommendation and Plan  Outcome Summary/Treatment Plan (OT)  Anticipated Discharge Disposition (OT): skilled nursing facility  Planned Therapy Interventions (OT Eval): adaptive equipment training, functional balance retraining, occupation/activity based interventions, patient/caregiver education/training, strengthening exercise, transfer/mobility retraining  Therapy Frequency (OT Eval): 3 times/wk  Plan of Care Review  Plan of Care Reviewed With: patient  Plan of Care Reviewed With: patient  Outcome Summary: pt requires signficant encouragement for participation; only agreeable to supine t-band exercises.   Outcome Measures     Row Name 11/11/19 1010             How much help from another is currently needed...    Putting on and taking off regular lower body clothing?  2  -ST      Bathing (including washing, rinsing, and drying)  2  -ST      Toileting (which includes using toilet bed pan or urinal)  2  -ST      Putting on and taking off regular upper body clothing  3  -ST      Taking care of personal grooming (such as brushing teeth)  3  -ST      Eating meals  4  -ST      AM-PAC 6 Clicks Score (OT)  16  -ST        User Key  (r) = Recorded By, (t) = Taken By, (c) = Cosigned By    Initials Name Provider Type    ST Elis Franco OTR Occupational Therapist           Time  Calculation:   Time Calculation- OT     Row Name 11/11/19 1010             Time Calculation- OT    OT Start Time  1010  -ST      OT Received On  11/11/19  -ST      OT Goal Re-Cert Due Date  11/12/19  -ST         Timed Charges    14249 - OT Therapeutic Exercise Minutes  15  -ST        User Key  (r) = Recorded By, (t) = Taken By, (c) = Cosigned By    Initials Name Provider Type    ST Elis Franco OTR Occupational Therapist        Therapy Charges for Today     Code Description Service Date Service Provider Modifiers Qty    [id number] HC OT THER PROC EA 15 MIN 11/11/2019 Elis Franco OTR GO 1               PIEDAD Tim  11/11/2019

## 2019-11-11 NOTE — PLAN OF CARE
Problem: Patient Care Overview  Goal: Plan of Care Review  Outcome: Ongoing (interventions implemented as appropriate)   11/11/19 1010   Coping/Psychosocial   Plan of Care Reviewed With patient   OTHER   Outcome Summary pt requires signficant encouragement for participation; only agreeable to supine t-band exercises.

## 2019-11-12 LAB
GLUCOSE BLDC GLUCOMTR-MCNC: 148 MG/DL (ref 70–130)
GLUCOSE BLDC GLUCOMTR-MCNC: 160 MG/DL (ref 70–130)
GLUCOSE BLDC GLUCOMTR-MCNC: 196 MG/DL (ref 70–130)
GLUCOSE BLDC GLUCOMTR-MCNC: 196 MG/DL (ref 70–130)

## 2019-11-12 PROCEDURE — 25010000002 PIPERACILLIN SOD-TAZOBACTAM PER 1 G: Performed by: INTERNAL MEDICINE

## 2019-11-12 PROCEDURE — 82962 GLUCOSE BLOOD TEST: CPT

## 2019-11-12 PROCEDURE — 63710000001 INSULIN DETEMIR PER 5 UNITS: Performed by: INTERNAL MEDICINE

## 2019-11-12 PROCEDURE — 63710000001 INSULIN LISPRO (HUMAN) PER 5 UNITS: Performed by: NURSE PRACTITIONER

## 2019-11-12 PROCEDURE — 25010000002 HEPARIN (PORCINE) PER 1000 UNITS: Performed by: NURSE PRACTITIONER

## 2019-11-12 PROCEDURE — 99232 SBSQ HOSP IP/OBS MODERATE 35: CPT | Performed by: NURSE PRACTITIONER

## 2019-11-12 RX ADMIN — INSULIN LISPRO 2 UNITS: 100 INJECTION, SOLUTION INTRAVENOUS; SUBCUTANEOUS at 10:24

## 2019-11-12 RX ADMIN — SODIUM CHLORIDE, PRESERVATIVE FREE 10 ML: 5 INJECTION INTRAVENOUS at 10:20

## 2019-11-12 RX ADMIN — INSULIN LISPRO 5 UNITS: 100 INJECTION, SOLUTION INTRAVENOUS; SUBCUTANEOUS at 18:20

## 2019-11-12 RX ADMIN — METOPROLOL TARTRATE 100 MG: 100 TABLET ORAL at 23:12

## 2019-11-12 RX ADMIN — HYDRALAZINE HYDROCHLORIDE 25 MG: 25 TABLET, FILM COATED ORAL at 23:15

## 2019-11-12 RX ADMIN — TERAZOSIN HYDROCHLORIDE ANHYDROUS 5 MG: 5 CAPSULE ORAL at 23:15

## 2019-11-12 RX ADMIN — NYSTATIN: 100000 CREAM TOPICAL at 10:24

## 2019-11-12 RX ADMIN — NYSTATIN: 100000 CREAM TOPICAL at 23:17

## 2019-11-12 RX ADMIN — SODIUM CHLORIDE, PRESERVATIVE FREE 10 ML: 5 INJECTION INTRAVENOUS at 23:18

## 2019-11-12 RX ADMIN — INSULIN LISPRO 2 UNITS: 100 INJECTION, SOLUTION INTRAVENOUS; SUBCUTANEOUS at 13:22

## 2019-11-12 RX ADMIN — Medication 250 MG: at 10:18

## 2019-11-12 RX ADMIN — TAZOBACTAM SODIUM AND PIPERACILLIN SODIUM 3.38 G: 375; 3 INJECTION, SOLUTION INTRAVENOUS at 00:56

## 2019-11-12 RX ADMIN — METOPROLOL TARTRATE 100 MG: 100 TABLET ORAL at 10:19

## 2019-11-12 RX ADMIN — HEPARIN SODIUM 5000 UNITS: 5000 INJECTION INTRAVENOUS; SUBCUTANEOUS at 06:50

## 2019-11-12 RX ADMIN — ASPIRIN 81 MG: 81 TABLET, COATED ORAL at 10:19

## 2019-11-12 RX ADMIN — INSULIN LISPRO 2 UNITS: 100 INJECTION, SOLUTION INTRAVENOUS; SUBCUTANEOUS at 23:16

## 2019-11-12 RX ADMIN — INSULIN DETEMIR 22 UNITS: 100 INJECTION, SOLUTION SUBCUTANEOUS at 23:18

## 2019-11-12 RX ADMIN — AMLODIPINE BESYLATE 10 MG: 10 TABLET ORAL at 10:19

## 2019-11-12 RX ADMIN — Medication 250 MG: at 23:12

## 2019-11-12 RX ADMIN — INSULIN DETEMIR 22 UNITS: 100 INJECTION, SOLUTION SUBCUTANEOUS at 10:20

## 2019-11-12 RX ADMIN — HYDRALAZINE HYDROCHLORIDE 25 MG: 25 TABLET, FILM COATED ORAL at 14:07

## 2019-11-12 RX ADMIN — HEPARIN SODIUM 5000 UNITS: 5000 INJECTION INTRAVENOUS; SUBCUTANEOUS at 14:07

## 2019-11-12 RX ADMIN — HYDRALAZINE HYDROCHLORIDE 25 MG: 25 TABLET, FILM COATED ORAL at 06:50

## 2019-11-12 RX ADMIN — DULOXETINE HYDROCHLORIDE 60 MG: 60 CAPSULE, DELAYED RELEASE ORAL at 10:19

## 2019-11-12 RX ADMIN — TAZOBACTAM SODIUM AND PIPERACILLIN SODIUM 3.38 G: 375; 3 INJECTION, SOLUTION INTRAVENOUS at 18:21

## 2019-11-12 RX ADMIN — TAZOBACTAM SODIUM AND PIPERACILLIN SODIUM 3.38 G: 375; 3 INJECTION, SOLUTION INTRAVENOUS at 10:29

## 2019-11-12 RX ADMIN — INSULIN LISPRO 5 UNITS: 100 INJECTION, SOLUTION INTRAVENOUS; SUBCUTANEOUS at 13:23

## 2019-11-12 RX ADMIN — OXYCODONE HYDROCHLORIDE AND ACETAMINOPHEN 1 TABLET: 5; 325 TABLET ORAL at 23:15

## 2019-11-12 NOTE — PROGRESS NOTES
[facility] Medicine Services  PROGRESS NOTE    Patient Name: Kendrick Crystal  : 1970  MRN: [id number]    Date of Admission: 10/26/2019  Primary Care Physician: Provider, No Known    Subjective   Subjective     CC:  weakness    HPI:  Continues to try to fall asleep during exam, wife in room. Tried to have a discussion with patient about his expectations or wishes for discharge, patient very quiet with eyes shut. No new problems, patient irritated when asked about participating with PT in order to qualify for rehab. Wife apologetic. Will not answer my questions about his ability to transfer self or if his strength has improved.     Review of Systems    Gen- No fevers, chills  CV- No chest pain, palpitations  Resp- No cough, dyspnea  GI- No N/V/D, abd pain    Objective   Objective     Vital Signs:   Temp:  [97.8 °F (36.6 °C)-98.2 °F (36.8 °C)] 97.9 °F (36.6 °C)  Heart Rate:  [89-98] 89  Resp:  [16-18] 16  BP: (146-176)/() 169/103        Physical Exam:    Constitutional: No acute distress, lying with eyes closed in bed, wife in chair   HENT: NCAT, mucous membranes moist  Respiratory: Clear to auscultation bilaterally, respiratory effort normal   Cardiovascular: RRR, s1 and s2  Gastrointestinal: Positive bowel sounds, soft, nontender, nondistended  Musculoskeletal: No right lower extremity edema, left AKA  Psychiatric: Flat affect, minimally cooperative  Neurologic: Oriented x 3, strength symmetric in all extremities, Cranial Nerves grossly intact to confrontation, speech clear  Skin: No rashes      Results Reviewed:    Results from last 7 days   Lab Units 11/10/19  0904 19  0621 19  0813   WBC 10*3/mm3 10.59 11.73* 11.43*   HEMOGLOBIN g/dL 9.7* 9.6* 9.0*   HEMATOCRIT % 32.2* 31.3* 29.5*   PLATELETS 10*3/mm3 339 386 373     Results from last 7 days   Lab Units 11/10/19  0904 19  0634 19  0813   SODIUM mmol/L 140 136 135*   POTASSIUM mmol/L 4.0 3.8 4.1    CHLORIDE mmol/L 104 98 102   CO2 mmol/L 25.0 25.0 23.0   BUN mg/dL 14 10 10   CREATININE mg/dL 0.59* 0.62* 0.50*   GLUCOSE mg/dL 202* 186* 226*   CALCIUM mg/dL 8.5* 8.5* 8.1*   ALT (SGPT) U/L  --  21  --    AST (SGOT) U/L  --  24  --      Estimated Creatinine Clearance: 209.3 mL/min (A) (by C-G formula based on SCr of 0.59 mg/dL (L)).    Microbiology Results Abnormal     Procedure Component Value - Date/Time    Ova & Parasite Examination - Stool, Per Rectum [[id number]] Collected:  10/30/19 0507    Lab Status:  Final result Specimen:  Stool from Per Rectum Updated:  11/05/19 1353     Ova + Parasite Exam No ova or parasites seen on concentrated smear     Trichrome Stain No ova or parasites seen on trichrome stain    Stool Culture (Reference Lab) - Stool, Per Rectum [[id number]] Collected:  10/30/19 0507    Lab Status:  Final result Specimen:  Stool from Per Rectum Updated:  11/04/19 1708     Salmonella/Shigella Screen Final report     Result 1 Comment     Comment: No Salmonella or Shigella recovered.        Campylobacter Culture Final report     Result 1 Comment     Comment: No Campylobacter species isolated.        E coli, Shiga toxin Assay Negative    Narrative:       Performed at:  [address] [place] [address]  [id number]  : Dl Laird PhD, Phone:  [phone]    Gastrointestinal Panel, PCR ([facility]) - Stool, Per Rectum [[id number]] Collected:  10/30/19 0507    Lab Status:  Final result Specimen:  Stool from Per Rectum Updated:  11/01/19 1357     Reference Lab Report See Attached Report     Comment: See scanned report       Blood Culture - Blood, Wrist, Right [[id number]] Collected:  10/26/19 1805    Lab Status:  Final result Specimen:  Blood from Wrist, Right Updated:  10/31/19 2000     Blood Culture No growth at 5 days    Blood Culture - Blood, Hand, Right [[id number]] Collected:  10/26/19 1822    Lab Status:  Final result Specimen:  Blood from Hand, Right Updated:  10/31/19  2000     Blood Culture No growth at 5 days    Body Fluid Culture - Body Fluid, Kidney, Right [[id number]]  (Abnormal)  (Susceptibility) Collected:  10/29/19 1057    Lab Status:  Final result Specimen:  Body Fluid from Kidney, Right Updated:  10/31/19 0624     Body Fluid Culture Scant growth (1+) Klebsiella pneumoniae ssp pneumoniae     Gram Stain Moderate (3+) WBCs seen      No organisms seen    Susceptibility      Klebsiella pneumoniae ssp pneumoniae     EYAD     Ampicillin Resistant     Ampicillin + Sulbactam Susceptible     Cefazolin Susceptible     Cefepime Susceptible     Ceftazidime Susceptible     Ceftriaxone Susceptible     Gentamicin Susceptible     Levofloxacin Susceptible     Piperacillin + Tazobactam Susceptible     Trimethoprim + Sulfamethoxazole Susceptible                    Clostridium Difficile Toxin - Stool, Per Rectum [[id number]] Collected:  10/30/19 0507    Lab Status:  Final result Specimen:  Stool from Per Rectum Updated:  10/30/19 0826    Narrative:       The following orders were created for panel order Clostridium Difficile Toxin - Stool, Per Rectum.  Procedure                               Abnormality         Status                     ---------                               -----------         ------                     Clostridium Difficile To...[[id number]]  Normal              Final result                 Please view results for these tests on the individual orders.    Clostridium Difficile Toxin, PCR - Stool, Per Rectum [[id number]]  (Normal) Collected:  10/30/19 0507    Lab Status:  Final result Specimen:  Stool from Per Rectum Updated:  10/30/19 0826     C. Difficile Toxins by PCR Not Detected    Narrative:       Performance characteristics of test not established for patients <2 years of age.  Negative for Toxigenic C. Difficile    Urine Culture - Urine, Urine, Clean Catch [[id number]]  (Abnormal) Collected:  10/28/19 1341    Lab Status:  Final result Specimen:  Urine, Clean Catch  Updated:  10/29/19 1033     Urine Culture Yeast isolated          Imaging Results (Last 24 Hours)     ** No results found for the last 24 hours. **          Results for orders placed during the hospital encounter of 05/18/19   Adult Transthoracic Echo Complete W/ Cont if Necessary Per Protocol    Narrative · Estimated EF = 60%.  · Mild mitral valve regurgitation is present  · Mild tricuspid valve regurgitation is present.  · Calculated right ventricular systolic pressure from tricuspid   regurgitation is 29 mmHg.          I have reviewed the medications:  Scheduled Meds:    amLODIPine 10 mg Oral Q24H   aspirin 81 mg Oral Daily   DULoxetine 60 mg Oral Daily   heparin (porcine) 5,000 Units Subcutaneous Q8H   hydrALAZINE 25 mg Oral Q8H   insulin detemir 22 Units Subcutaneous Q12H   insulin lispro 0-7 Units Subcutaneous 4x Daily With Meals & Nightly   insulin lispro 5 Units Subcutaneous TID With Meals   metoprolol tartrate 100 mg Oral Q12H   nystatin  Topical BID   piperacillin-tazobactam 3.375 g Intravenous Q8H   saccharomyces boulardii 250 mg Oral BID   sodium chloride 10 mL Intravenous Q12H   terazosin 5 mg Oral Nightly     Continuous Infusions:   PRN Meds:.•  acetaminophen **OR** acetaminophen **OR** acetaminophen  •  dextrose  •  dextrose  •  glucagon (human recombinant)  •  loperamide  •  magnesium sulfate **OR** magnesium sulfate **OR** magnesium sulfate  •  ondansetron  •  oxyCODONE-acetaminophen  •  potassium chloride **OR** potassium chloride **OR** potassium chloride  •  Insert peripheral IV **AND** sodium chloride  •  sodium chloride      Assessment/Plan   Assessment / Plan     Active Hospital Problems    Diagnosis  POA   • Pneumaturia [R39.89]  Clinically Undetermined   • Perinephric abscess [N15.1]  Yes   • Peripheral vascular disease (CMS/HCC) [I73.9]  Yes   • S/P BKA (below knee amputation), left (CMS/HCC) [Z89.512]  Not Applicable   • Diabetic ulcer of right lower leg (CMS/HCC) [E11.622, L97.919]  Yes    • Type 2 diabetes mellitus with circulatory disorder and uncontrolled [E11.59]  Yes   • Hyperlipemia [E78.5]  Yes   • Essential hypertension [I10]  Yes      Resolved Hospital Problems   No resolved problems to display.        Brief Hospital Course to date:  Kendrick Crystal is a 49 y.o. male who presented to the ER with weakness.  Discharged 10/23 - admitted again on 10/26    High Risk for Re-admission  - discharged on 10/23 - readmit 10/26  - high LACE score  - history of compliance issues  Perinephric Abscess  - Urology following  - CRS examined patient yesterday and there was no evidence of rectal mass or abscess, no immediate need for colonoscopy   --per ID notes, last US revealed no abscess present on 11/7/19 that was discussed/ verified by radiology and ID  --Discussed with Dr Dong today he is likely to discontinue IV antibiotic very soon, possibly tomorrow.   Uncontrolled Diabetes  - long history of poorly controlled DM  - mostly A1Cs >13  - high sugars for over 2 yrs, likely longer  Obesity  - suspected undiagnosed sleep apnea  Depression  - continue Cymbalta 60 mg daily  Debility  - s/p Below Knee Amputation  - immobility  --uncertain of discharge plan at this time, due to not participating in therapy to qualify for IPPT, vs home with home health ?safe disposition, CM follows   Hypertension  - on Norvasc, hydralazine, metoprolol, terazosin      High Risk Patient for poor outcome  Unclear if he has primary care, multiple admissions here    DVT Prophylaxis:  Heparin 5,000 Units SC every 8 hours    Disposition: I expect the patient to be discharged TBD    CODE STATUS:   Code Status and Medical Interventions:   Ordered at: 10/26/19 2039     Level Of Support Discussed With:    Patient     Code Status:    CPR     Medical Interventions (Level of Support Prior to Arrest):    Full         Electronically signed by CHINTAN Martin, 11/12/19, 3:08 PM.

## 2019-11-12 NOTE — PROGRESS NOTES
[facility] Progress Note        Antibiotics:  Anti-Infectives (From admission, onward)    Ordered     Dose/Rate Route Frequency Start Stop    19 0848  fluconazole (DIFLUCAN) IVPB 200 mg     Shauna Sanford RN reviewed the order on 19 1844.   Ordering Provider:  Usman Dong MD    200 mg  100 mL/hr over 60 Minutes Intravenous Daily 19 0945 11/10/19 1133    10/30/19 0725  fluconazole (DIFLUCAN) IVPB 200 mg     Ordering Provider:  Usman Dong MD    200 mg  over 60 Minutes Intravenous Daily 10/30/19 0900 19 1028    10/28/19 0743  piperacillin-tazobactam (ZOSYN) 3.375 g in iso-osmotic dextrose 50 ml (premix)     Usman Dong MD reviewed the order on 19 1034.   Ordering Provider:  Usman Dong MD    3.375 g  over 4 Hours Intravenous Every 8 Hours 10/28/19 1700 19 1659    10/28/19 0743  piperacillin-tazobactam (ZOSYN) 3.375 g in iso-osmotic dextrose 50 ml (premix)     Ordering Provider:  Usman Dong MD    3.375 g  over 30 Minutes Intravenous Once 10/28/19 1100 10/28/19 1303    10/26/19 2040  meropenem (MERREM) 1 g/100 mL 0.9% NS VTB (mbp)     Ordering Provider:  Susan Infante APRN    1 g  over 30 Minutes Intravenous Once 10/26/19 2130 10/26/19 2241          CC: fatigue    HPI:    Patient is a 49 y.o.  Yr old male with history of poorly contolled T2DM, DUNLAP/liver cirrhosis, HTN, PVD/Left BKA, and multiple skin abscesses/MRSA who presented to [facility] ED with right shin wound infection summer 2018.  He was unable to get to see Dr. Martinez as his father passed away unexpectedly on 18 and he had to wait until after the .  The wound worsened becoming gangrenous and he came to [facility] ED in 2018.  He also had been hospitalized at [facility] and then transferred to  for a severe penis/scrotum infection requiring surgical debridement in summer 2018.  He received antibiotics regarding his right leg infection, generally improved  over the remainder of summer 2018 but that area had not completely healed. He was admitted April 24, 2019 with acute left lower abdominal wall redness/swelling and pain, spontaneous drainage associated with fever/chills and uncontrolled blood sugars.  4/26 I&D requiring wide debridement , severe/deep infection and transferred to [facility] on May 3 with IV Zosyn/oral doxycycline. Cultures had lactobacillus and mixed gram-positive skin sherly including alpha strep, staph epidermidis and corynebacterium. Readmitted to [facility] May 18, 2019, increasing generalized edema ultimately transitioned to oral doxycycline and healed.     He presented to the emergency room July 30, 2019 with acute rectal pain that had begun 7/27; this is associated with constipation for days, chills and nausea/dry heaving.  White blood cell count elevated, high hemoglobin A1c over 13, urinalysis with hematuria/pyuria and CT scan with 3 x 3 cm fluid collection anterior to the distal rectum and per discussion with Dr. Akbar/Jennifer, this is not in the prostate.  Colorectal surgery/urology saw him for consideration of surgical options/timing/threshold, etc..     8/2/19 I&Dper Dr Payne perirectal abscess; patient reports that he had instrumented his rectum prior to hospitalization with enema     8/3/19 urinary retention overnight requiring in/out catheterization per patient.  Creat climb     8/4/19 further creat climb; childs placed and lisinopril stopped and d/w Dr Rubio;  ?obstruction initially associated with retention postop (1200 out with I/O cath postop per nursing) -v- contrast from CT -v- lisinopril/medication/vancomycin -v- relative hypotension -v- likely combination of factors with ATN; nephrology following; vancomycin trough high and vancomycin stopped empirically 8/3 although high trough potentially accumulation as a consequence of diminishing renal function associated with retention/contrast/pressure rather than cause.  " Nonetheless, avoid for now; creatinine trending down.        8/7 in retrospect he remembers air in his urine at admit which has raised concern for possible fistula from urinary tract;  No fecaluria and culture from abscess is fecal sherly;  ?rectal-urethral fistula;  Dr Payne aware     Culture with E. coli/Klebsiella species and creatinine generally trended down, transitioned to oral antibiotics at discharge.     Readmitted August 17, 2019 with nausea/vomiting/dry heaves for 3 days.  Walker catheter was placed and CT scan of the abdomen showed:      \"Previously seen fluid collection identified inferior to the prostate gland is more increased in density on today's examination. There is wall thickening again seen throughout the bladder. Findings again are concerning for cystitis.\" per radiology     He was transitioned to oral omnicef/topical antifungal on approx 8/23/19; Noncompliant with f/u in our office     READMITTED 10/8/19 RLQ abd pain, urinary retention, nausea, dysuria and generalized fatigue     UTI by U/A, subsequent blood cultures positive for  kleb sp, urine with kleb and e coli ;  Abnormal CT abd with right perinephric fluid collection, advanced cirrhosis, urinary bladder thickening.  10/9 Aspirate of perinephric fluid collection consistent with abscess/kleb and white blood cells; 10/16/19 cytoscopy with bullous change per Dr Gutierres but no fistula per him; 10/19/19 intermittent nausea, no vomiting or dry heaves this am, intermittent dry cough broadened to zosyn with ?aspiration pneumonia evolving after dry heaves/vomiting and had intermittent diarrhea, oral vancomycin added empirically with history of prior C. Difficile; improved with IV Zosyn/oral vancomycin and he refused consideration of placement.  He insisted on home, discharged October 23 and noncompliant with outpatient therapy and outpatient follow-up October 25.  He had become fatigued such that his family could not assist him out of bed and it was " recommended by my office that he return to the emergency room October 25.  He apparently refused that but did come to the emergency room October 26.     Readmitted October 26, 2019 with generalized weakness, fatigue/malaise and nausea/vomiting/diarrhea.  CT scan revealed increased size of perinephric fluid collection per radiology.     On October 28, patient had reported increased diarrhea, at least 6 episodes overnight and watery/thin but no hematochezia melena or hematemesis.  Vomiting has subsided     10/29 drain placed    11/5/19 drain inadvertently out overnight per him    11/8 voiding cystourethrogram per urology; ultrasound abdomen with no mention of abscess per radiology    11/12/19 doing ok per him;  GI habits variable, but no vomiting today; stool consistency variable and no diarrhea at present per family; no abs pain.  No dysuria hematuria or pyuria.  Denies new hesitancy urgency or flank pain.      No headache photophobia or neck stiffness.  No shortness of breath cough or hemoptysis.  No fevers chills or sweats.  No rash.  No other particular exposures     ROS:      11/12/19 No f/c/s. No vomiting. No rash. No new ADR to Abx.     Constitutional-- No Fever, chills or sweats.  Appetite diminished with generalized malaise and fatigue  Heent-- No new vision, hearing or throat complaints.  No epistaxis or oral sores.  Denies odynophagia or dysphagia.  No flashers, floaters or eye pain. No odynophagia or dysphagia. No headache, photophobia or neck stiffness.  CV-- No chest pain, palpitation or syncope  Resp-- No SOB/cough/Hemoptysis  GI-as above.  No hematochezia, melena, or hematemesis. Denies jaundice or chronic liver disease.  -- No dysuria, hematuria, or flank pain.  Denies hesitancy or flank pain.  Lymph- no swollen lymph nodes in neck/axilla or groin.   Heme- No active bruising or bleeding; no Hx of DVT or PE.  MS-- no swelling or pain in the bones or joints of arms/legs.  No new back pain.  Neuro-- No  "acute focal weakness or numbness in the arms or legs.  No seizures.     Full 12 point review of systems reviewed and negative otherwise for acute complaints, except for above      PE:   /91 (BP Location: Left arm, Patient Position: Lying)   Pulse 97   Temp 97.8 °F (36.6 °C) (Oral)   Resp 16   Ht 190.5 cm (75\")   Wt 123 kg (271 lb 8 oz)   SpO2 95%   BMI 33.94 kg/m²     GENERAL: Awake and alert, in no acute distress.   HEENT: Normocephalic, atraumatic.  PERRL. EOMI. No conjunctival injection. No icterus. Oropharynx clear without evidence of thrush or exudate. No evidence of peridontal disease.    NECK: Supple without nuchal rigidity. No mass.  LYMPH: No cervical, axillary or inguinal lymphadenopathy.  HEART: RRR; No murmur, rubs, gallops.   LUNGS: Diminished at bases to auscultation bilaterally with slight rhonchi bilateral but no wheezing or rales. Normal respiratory effort. Nonlabored. No dullness.  ABDOMEN: Soft, nontender, nondistended. Positive bowel sounds. No rebound or guarding. NO mass or HSM.  EXT:  No cyanosis, clubbing or edema. No cord.  :  erythema/sattelite lesions lesions  MSK: FROM without joint effusions noted arms/legs.    SKIN: Warm and dry without cutaneous eruptions on Inspection/palpation.    NEURO: Oriented to PPT. No focal deficits on motor/sensory exam at arms/legs.     No peripheral stigmata/phenomena of endocarditis     PICC line without obvious redness or drainage     Laboratory Data    Results from last 7 days   Lab Units 11/10/19  0904 11/07/19  0621 11/06/19  0813   WBC 10*3/mm3 10.59 11.73* 11.43*   HEMOGLOBIN g/dL 9.7* 9.6* 9.0*   HEMATOCRIT % 32.2* 31.3* 29.5*   PLATELETS 10*3/mm3 339 386 373     Results from last 7 days   Lab Units 11/10/19  0904   SODIUM mmol/L 140   POTASSIUM mmol/L 4.0   CHLORIDE mmol/L 104   CO2 mmol/L 25.0   BUN mg/dL 14   CREATININE mg/dL 0.59*   GLUCOSE mg/dL 202*   CALCIUM mg/dL 8.5*     Results from last 7 days   Lab Units 11/07/19  0634 "   ALK PHOS U/L 224*   BILIRUBIN mg/dL <0.2*   ALT (SGPT) U/L 21   AST (SGOT) U/L 24               Estimated Creatinine Clearance: 209.3 mL/min (A) (by C-G formula based on SCr of 0.59 mg/dL (L)).      Microbiology:      Radiology:  Imaging Results (last 72 hours)     Procedure Component Value Units Date/Time    CT Abdomen Pelvis With Contrast [[id number]] Collected:  10/26/19 2035     Updated:  10/26/19 2037    Narrative:       CT ABDOMEN AND PELVIS WITH CONTRAST, 10/26/2019    HISTORY:  49-year-old male with recent history of a right perinephric fluid collection that underwent diagnostic needle aspiration 10/9/2019. He has been on IV antibiotic therapy for possible perinephric abscess. Examination is requested today for follow-up.    TECHNIQUE:  CT imaging of the abdomen and pelvis with IV contrast. Radiation dose reduction techniques included automated exposure control. Radiation audit for CT and nuclear cardiology exams in the last 12 months: 0.    ABDOMEN FINDINGS:  Rim-enhancing pericapsular or subcapsular fluid collection along the posterior margin of the lower pole right kidney has enlarged since 10/18/2019. It previously measured about 5.7 x 3.9 cm axially and currently measures 6.0 x 4.8 cm in the same location  (see axial image 48). There is associated perinephric soft tissue stranding, most likely inflammatory. The findings are compatible with clinically suspected perinephric abscess.    Both kidneys enhance normally. There is no evidence of upper urinary tract obstruction.    Liver, pancreas and spleen are within normal limits. No bile duct or pancreatic duct dilatation.    Small bowel and colon are normal in caliber and appearance without GI contrast. Normal appendix. No gastric distention, hiatal hernia or distal esophageal dilatation.    PELVIS FINDINGS:  Urinary bladder, prostate and rectum are within normal limits.    Lung base images show a tiny right pleural effusion.      Impression:       1.   Likely perinephric or subcapsular abscess along the posterior aspect of the lower pole right kidney. This has enlarged since 10/18/2019.  2.  Normal renal parenchymal enhancement. No CT evidence of pyelonephritis at this time. No evidence of upper urinary tract obstruction.  3.  Tiny right pleural effusion.    Signer Name: Fadi Healy MD   Signed: 10/26/2019 8:35 PM   Workstation Name: [email or web address]    [facility]            Impression:      --Acute Kleb septicemia/sepsis on treatment since last admit, likely urinary source/pyelnoephritis associated with urinary retention and  with abnormal CT scan with perinephric collection/abscess and Kleb on aspirate that has persisted as of aspirate 10/29 arguing relatively sequestered focus not penetrated by systemic abx and now with drain placed 10/29;  IV  Zosyn ongoing; urology to determine any timing/option or threshold for further intervention such as surgical debridement if not responsive to drain/abx, or other functional/anatomic assessment.  DRAIN INADVERTENTLY OUT 11/4;  Monitor clinically,   D/w Dr Forbes; ultrasound done on November 8 with no mention of abscess by radiology.  Sought  radiology clarification as to whether this modality is good enough in comparison to prior CT scans to know whether abscess is better/gone versus need for different modality such as repeat CT scan to better clarify. I d/w Dr Escobedo 11/11 and he reviewed the US from 11/8 and he reports to me US is adequate for followup on this and NO current evidence for abscess     --Acute perinephric abscess as above;   Urology following to help guide timing/option/threshold for further drainage (perc -v- other);  Perc drain 10/29 and kleb persisted in culture; see above regarding repeat/recent scans    --urinary frequency with cutaneous candidiasis and candiduria/pyruria, improved;  S/p diflucan     --abnormal imaging with dry cough and probable pneumonia last  admission;  At risk for aspiration with recent vomiting/dry heaves and empiric zosyn started 10/19; changed to Invanz 10/23 to help facilitate home therapy with mom and changed back to Zosyn October 28;   no productive cough at present, but if it develops, then send for culture     --acute  diarrhea 10/27-10/28 with Hx CDiff per him/family and recent empiric oral vancomycin, CDT negative and oral vancomycin stopped;  If recurrent diarrhea then consider more wrolup or empiric therapy     --Hx perirectal abscess ; Colorectal surgery, Dr. Payne previously saw and is following.  Drainage as above on August 2 with mxed Fecal sherly in culture; CT scans  with no mention of abscess on 8/18 study;   any timing/option or threshold for further GI/colorectal work-up per Dr payne/CRS     --History urinary retention.  urology following     --Hx ARF in August 2019;  ?obstruction initially associated with retention postop (1200 out with I/O cath postop per nursing) -v- contrast from CT -v- lisinopril/medication/vancomycin -v- relative hypotension -v- likely combination of factors with ATN;  vancomycin trough high and vancomycin stopped empirically 8/3 although high trough potentially accumulation as a consequence of diminishing renal function associated with retention/contrast/pressure rather than cause.  Nonetheless, avoiding for now; likely further acute kidney injury at admission August 17 associated with dehydration/prerenal/ATN etiology     --History MRSA;  Not in current culture     --Diabetes mellitus type 2, uncontrolled.  He reports the reason for this is forgetfulness in past     --History Johnston and chronic liver disease/cirrhosis  per past notes     --Left BKA     --Peripheral vascular disease     --medical noncompliance and inability to care for self at home.       PLAN:      --IV Zosyn to finish soon; s/p  IV diflucan      --Check/review labs cultures and scans     --Discussed with microbiology     --History per nursing  staff      --Discussed with family, partial history per them     --Highly complex set of issues with high risk for further serious morbidity and other serious sequela     --Colorectal surgery and urology have followed; urology following to determine timing/option or threshold for further percutaneous aspiration/drainage versus other surgery regarding  Abscess and to guide further workup for air in urine     --D/w Dr Escobedo as above    **he is at end of 2 weeks IV antibiotics from drain placement on 10/29 and clinically better, repeat imaging/US no abscess per my discussion with raiology as above;  I anticipate stopping abx soon if continue clinical stability          Usman Dong MD  11/12/2019

## 2019-11-12 NOTE — PROGRESS NOTES
Continued Stay [facility]  [facility]     Patient Name: Kendrick Crystal  MRN: [id number]  Today's Date: 11/12/2019    Admit Date: 10/26/2019    Discharge Plan     Row Name 11/12/19 1124       Plan    Plan  Home with [facility]    Patient/Family in Agreement with Plan  yes    Plan Comments  Spoke with Bridger at [facility] and they are declining to accept the patient because he refuses to work with physical therapy. CM will continue to follow.    Final Discharge Disposition Code  06 - home with home health care        Discharge Codes    No documentation.       Expected Discharge Date and Time     Expected Discharge Date Expected Discharge Time    Nov 15, 2019             Fadi Valentine RN

## 2019-11-12 NOTE — PROGRESS NOTES
Adult Nutrition  Assessment/PES    Patient Name:  Kendrick Crystal  YOB: 1970  MRN: [id number]  Admit Date:  10/26/2019    Assessment Date:  11/12/2019        Reason for Assessment     Row Name 11/12/19 1311          Reason for Assessment    Reason For Assessment  follow-up protocol   15 mins     Diagnosis  -- per MD notes this adm.                   Nutrition Prescription Ordered     Row Name 11/12/19 1319 11/12/19 1318       Nutrition Prescription PO    Supplement  -- premier pro   --    Supplement Frequency  2 times a day  --    Common Modifiers  --  Consistent Carbohydrate        Evaluation of Received Nutrient/Fluid Intake     Row Name 11/12/19 1318          PO Evaluation    Number of Meals  5     % PO Intake  85               Problem/Interventions:  Problem 1     Row Name 11/12/19 1318          Nutrition Diagnoses Problem 1    Problem 1  Nutrition Appropriate for Condition at this Time     Etiology (related to)  MNT for Treatment/Condition     Signs/Symptoms (evidenced by)  PO Intake     Percent (%) intake recorded  5 %     Over number of meals  85               Intervention Goal     Row Name 11/12/19 1318          Intervention Goal    PO  Maintain intake         Nutrition Intervention     Row Name 11/12/19 1318          Nutrition Intervention    RD/Tech Action  Follow Tx progress           Education/Evaluation     Row Name 11/12/19 1318          Monitor/Evaluation    Monitor  Per protocol           Electronically signed by:  Carolyn Mcgarry MS,RD,LD  11/12/19 1:19 PM

## 2019-11-12 NOTE — PLAN OF CARE
Problem: Patient Care Overview  Goal: Plan of Care Review  Outcome: Ongoing (interventions implemented as appropriate)   11/12/19 0804   Coping/Psychosocial   Plan of Care Reviewed With patient;spouse   Plan of Care Review   Progress no change   OTHER   Outcome Summary PATIENT ALERT AND ORIENTED. VSS ON RA. NO COMPLAINS OF PAIN. WIFE AT BEDSIDE. CALL LIGHT WITHIN REACH. WILL CONTINUE TO MONITOR.

## 2019-11-13 LAB
GLUCOSE BLDC GLUCOMTR-MCNC: 109 MG/DL (ref 70–130)
GLUCOSE BLDC GLUCOMTR-MCNC: 113 MG/DL (ref 70–130)
GLUCOSE BLDC GLUCOMTR-MCNC: 145 MG/DL (ref 70–130)
GLUCOSE BLDC GLUCOMTR-MCNC: 158 MG/DL (ref 70–130)
GLUCOSE BLDC GLUCOMTR-MCNC: 174 MG/DL (ref 70–130)

## 2019-11-13 PROCEDURE — 82962 GLUCOSE BLOOD TEST: CPT

## 2019-11-13 PROCEDURE — 99231 SBSQ HOSP IP/OBS SF/LOW 25: CPT | Performed by: PHYSICIAN ASSISTANT

## 2019-11-13 PROCEDURE — 63710000001 INSULIN LISPRO (HUMAN) PER 5 UNITS: Performed by: NURSE PRACTITIONER

## 2019-11-13 PROCEDURE — 63710000001 INSULIN DETEMIR PER 5 UNITS: Performed by: INTERNAL MEDICINE

## 2019-11-13 PROCEDURE — 25010000002 PIPERACILLIN SOD-TAZOBACTAM PER 1 G: Performed by: INTERNAL MEDICINE

## 2019-11-13 PROCEDURE — 63710000001 INSULIN DETEMIR PER 5 UNITS: Performed by: HOSPITALIST

## 2019-11-13 PROCEDURE — 25010000002 HEPARIN (PORCINE) PER 1000 UNITS: Performed by: NURSE PRACTITIONER

## 2019-11-13 RX ADMIN — ASPIRIN 81 MG: 81 TABLET, COATED ORAL at 08:07

## 2019-11-13 RX ADMIN — NYSTATIN: 100000 CREAM TOPICAL at 08:07

## 2019-11-13 RX ADMIN — AMLODIPINE BESYLATE 10 MG: 10 TABLET ORAL at 08:07

## 2019-11-13 RX ADMIN — Medication 250 MG: at 08:07

## 2019-11-13 RX ADMIN — HYDRALAZINE HYDROCHLORIDE 25 MG: 25 TABLET, FILM COATED ORAL at 13:34

## 2019-11-13 RX ADMIN — SODIUM CHLORIDE, PRESERVATIVE FREE 10 ML: 5 INJECTION INTRAVENOUS at 08:13

## 2019-11-13 RX ADMIN — HEPARIN SODIUM 5000 UNITS: 5000 INJECTION INTRAVENOUS; SUBCUTANEOUS at 13:34

## 2019-11-13 RX ADMIN — INSULIN DETEMIR 22 UNITS: 100 INJECTION, SOLUTION SUBCUTANEOUS at 08:43

## 2019-11-13 RX ADMIN — METOPROLOL TARTRATE 100 MG: 100 TABLET ORAL at 08:12

## 2019-11-13 RX ADMIN — METOPROLOL TARTRATE 100 MG: 100 TABLET ORAL at 21:23

## 2019-11-13 RX ADMIN — HYDRALAZINE HYDROCHLORIDE 25 MG: 25 TABLET, FILM COATED ORAL at 05:57

## 2019-11-13 RX ADMIN — TAZOBACTAM SODIUM AND PIPERACILLIN SODIUM 3.38 G: 375; 3 INJECTION, SOLUTION INTRAVENOUS at 01:28

## 2019-11-13 RX ADMIN — INSULIN DETEMIR 20 UNITS: 100 INJECTION, SOLUTION SUBCUTANEOUS at 21:37

## 2019-11-13 RX ADMIN — INSULIN LISPRO 2 UNITS: 100 INJECTION, SOLUTION INTRAVENOUS; SUBCUTANEOUS at 08:22

## 2019-11-13 RX ADMIN — HYDRALAZINE HYDROCHLORIDE 25 MG: 25 TABLET, FILM COATED ORAL at 21:23

## 2019-11-13 RX ADMIN — TAZOBACTAM SODIUM AND PIPERACILLIN SODIUM 3.38 G: 375; 3 INJECTION, SOLUTION INTRAVENOUS at 16:32

## 2019-11-13 RX ADMIN — HEPARIN SODIUM 5000 UNITS: 5000 INJECTION INTRAVENOUS; SUBCUTANEOUS at 21:23

## 2019-11-13 RX ADMIN — TERAZOSIN HYDROCHLORIDE ANHYDROUS 5 MG: 5 CAPSULE ORAL at 21:25

## 2019-11-13 RX ADMIN — INSULIN LISPRO 5 UNITS: 100 INJECTION, SOLUTION INTRAVENOUS; SUBCUTANEOUS at 13:34

## 2019-11-13 RX ADMIN — NYSTATIN: 100000 CREAM TOPICAL at 21:37

## 2019-11-13 RX ADMIN — INSULIN LISPRO 5 UNITS: 100 INJECTION, SOLUTION INTRAVENOUS; SUBCUTANEOUS at 08:21

## 2019-11-13 RX ADMIN — OXYCODONE HYDROCHLORIDE AND ACETAMINOPHEN 1 TABLET: 5; 325 TABLET ORAL at 21:23

## 2019-11-13 RX ADMIN — HEPARIN SODIUM 5000 UNITS: 5000 INJECTION INTRAVENOUS; SUBCUTANEOUS at 05:57

## 2019-11-13 RX ADMIN — Medication 250 MG: at 21:23

## 2019-11-13 RX ADMIN — SODIUM CHLORIDE, PRESERVATIVE FREE 10 ML: 5 INJECTION INTRAVENOUS at 21:24

## 2019-11-13 RX ADMIN — DULOXETINE HYDROCHLORIDE 60 MG: 60 CAPSULE, DELAYED RELEASE ORAL at 08:07

## 2019-11-13 RX ADMIN — TAZOBACTAM SODIUM AND PIPERACILLIN SODIUM 3.38 G: 375; 3 INJECTION, SOLUTION INTRAVENOUS at 08:18

## 2019-11-13 RX ADMIN — INSULIN LISPRO 2 UNITS: 100 INJECTION, SOLUTION INTRAVENOUS; SUBCUTANEOUS at 21:25

## 2019-11-13 NOTE — PLAN OF CARE
Problem: Fall Risk (Adult)  Goal: Identify Related Risk Factors and Signs and Symptoms  Outcome: Outcome(s) achieved Date Met: 11/13/19 11/09/19 1103   Fall Risk (Adult)   Related Risk Factors (Fall Risk) fear of falling;gait/mobility problems;history of falls   Signs and Symptoms (Fall Risk) presence of risk factors

## 2019-11-13 NOTE — PLAN OF CARE
Problem: Skin Injury Risk (Adult)  Goal: Identify Related Risk Factors and Signs and Symptoms  Outcome: Outcome(s) achieved Date Met: 11/13/19    Goal: Skin Health and Integrity  Outcome: Ongoing (interventions implemented as appropriate)   11/13/19 8150   Skin Injury Risk (Adult)   Skin Health and Integrity making progress toward outcome       Problem: Diabetes, Type 2 (Adult)  Goal: Signs and Symptoms of Listed Potential Problems Will be Absent, Minimized or Managed (Diabetes, Type 2)  Outcome: Ongoing (interventions implemented as appropriate)   11/13/19 8994   Goal/Outcome Evaluation   Problems Assessed (Type 2 Diabetes) all   Problems Present (Type 2 Diabetes) hyperglycemia  (Pt. continues to be noncompliant.)

## 2019-11-13 NOTE — PROGRESS NOTES
"    [facility] Medicine Services  PROGRESS NOTE    Patient Name: Kendrick Crystal  : 1970  MRN: [id number]    Date of Admission: 10/26/2019  Primary Care Physician: Provider, No Known    Subjective     CC: f/u weakness    HPI:  Laying in bed. Wife in recliner. Fell asleep multiple times during interview - wife apologetic. I became firm with the patient, explaining to him that PT/OT had signed off and were no longer working with him due to his refusal to work with them. He stated that he would only go to [facility] - I noted that [facility] had declined and offered him other rehab facility options, which he refused. \"I'll just go home then.\"     Review of Systems  Gen- No fevers, chills  CV- No chest pain, palpitations  Resp- No cough, dyspnea  GI- No N/V/D, abd pain    Objective     Vital Signs:   Temp:  [97.3 °F (36.3 °C)-97.8 °F (36.6 °C)] 97.3 °F (36.3 °C)  Heart Rate:  [74-95] 78  Resp:  [14-18] 18  BP: (117-160)/() 149/92        Physical Exam:  Constitutional: No acute distress, lying with eyes closed in bed, falls asleep easily. Wife sitting up in chair   HENT: NCAT, mucous membranes moist  Respiratory: Clear to auscultation bilaterally, respiratory effort normal   Cardiovascular: RRR, s1 and s2  Gastrointestinal: Positive bowel sounds, soft, nontender, nondistended  Musculoskeletal: No right lower extremity edema, left AKA  Psychiatric: Flat affect, minimally cooperative  Neurologic: Oriented x 3, strength symmetric in all extremities, Cranial Nerves grossly intact to confrontation, speech clear  Skin: No rashes    Results Reviewed:    Results from last 7 days   Lab Units 11/10/19  0904 19  0621   WBC 10*3/mm3 10.59 11.73*   HEMOGLOBIN g/dL 9.7* 9.6*   HEMATOCRIT % 32.2* 31.3*   PLATELETS 10*3/mm3 339 386     Results from last 7 days   Lab Units 11/10/19  0904 19  0634   SODIUM mmol/L 140 136   POTASSIUM mmol/L 4.0 3.8   CHLORIDE mmol/L 104 98   CO2 mmol/L " 25.0 25.0   BUN mg/dL 14 10   CREATININE mg/dL 0.59* 0.62*   GLUCOSE mg/dL 202* 186*   CALCIUM mg/dL 8.5* 8.5*   ALT (SGPT) U/L  --  21   AST (SGOT) U/L  --  24     Estimated Creatinine Clearance: 209.3 mL/min (A) (by C-G formula based on SCr of 0.59 mg/dL (L)).    Microbiology Results Abnormal     Procedure Component Value - Date/Time    Ova & Parasite Examination - Stool, Per Rectum [[id number]] Collected:  10/30/19 0507    Lab Status:  Final result Specimen:  Stool from Per Rectum Updated:  11/05/19 1353     Ova + Parasite Exam No ova or parasites seen on concentrated smear     Trichrome Stain No ova or parasites seen on trichrome stain    Stool Culture (Reference Lab) - Stool, Per Rectum [[id number]] Collected:  10/30/19 0507    Lab Status:  Final result Specimen:  Stool from Per Rectum Updated:  11/04/19 1708     Salmonella/Shigella Screen Final report     Result 1 Comment     Comment: No Salmonella or Shigella recovered.        Campylobacter Culture Final report     Result 1 Comment     Comment: No Campylobacter species isolated.        E coli, Shiga toxin Assay Negative    Narrative:       Performed at:  [address] [place] [address]  [id number]  : Dl Laird PhD, Phone:  [phone]    Gastrointestinal Panel, PCR ([facility]) - Stool, Per Rectum [[id number]] Collected:  10/30/19 0507    Lab Status:  Final result Specimen:  Stool from Per Rectum Updated:  11/01/19 1357     Reference Lab Report See Attached Report     Comment: See scanned report       Blood Culture - Blood, Wrist, Right [[id number]] Collected:  10/26/19 1805    Lab Status:  Final result Specimen:  Blood from Wrist, Right Updated:  10/31/19 2000     Blood Culture No growth at 5 days    Blood Culture - Blood, Hand, Right [[id number]] Collected:  10/26/19 1822    Lab Status:  Final result Specimen:  Blood from Hand, Right Updated:  10/31/19 2000     Blood Culture No growth at 5 days    Body Fluid Culture - Body  Fluid, Kidney, Right [[id number]]  (Abnormal)  (Susceptibility) Collected:  10/29/19 1057    Lab Status:  Final result Specimen:  Body Fluid from Kidney, Right Updated:  10/31/19 0624     Body Fluid Culture Scant growth (1+) Klebsiella pneumoniae ssp pneumoniae     Gram Stain Moderate (3+) WBCs seen      No organisms seen    Susceptibility      Klebsiella pneumoniae ssp pneumoniae     EYAD     Ampicillin Resistant     Ampicillin + Sulbactam Susceptible     Cefazolin Susceptible     Cefepime Susceptible     Ceftazidime Susceptible     Ceftriaxone Susceptible     Gentamicin Susceptible     Levofloxacin Susceptible     Piperacillin + Tazobactam Susceptible     Trimethoprim + Sulfamethoxazole Susceptible                    Clostridium Difficile Toxin - Stool, Per Rectum [[id number]] Collected:  10/30/19 0507    Lab Status:  Final result Specimen:  Stool from Per Rectum Updated:  10/30/19 0826    Narrative:       The following orders were created for panel order Clostridium Difficile Toxin - Stool, Per Rectum.  Procedure                               Abnormality         Status                     ---------                               -----------         ------                     Clostridium Difficile To...[[id number]]  Normal              Final result                 Please view results for these tests on the individual orders.    Clostridium Difficile Toxin, PCR - Stool, Per Rectum [[id number]]  (Normal) Collected:  10/30/19 0507    Lab Status:  Final result Specimen:  Stool from Per Rectum Updated:  10/30/19 0826     C. Difficile Toxins by PCR Not Detected    Narrative:       Performance characteristics of test not established for patients <2 years of age.  Negative for Toxigenic C. Difficile    Urine Culture - Urine, Urine, Clean Catch [[id number]]  (Abnormal) Collected:  10/28/19 1341    Lab Status:  Final result Specimen:  Urine, Clean Catch Updated:  10/29/19 1033     Urine Culture Yeast isolated        Imaging  Results (Last 24 Hours)     ** No results found for the last 24 hours. **        Results for orders placed during the hospital encounter of 05/18/19   Adult Transthoracic Echo Complete W/ Cont if Necessary Per Protocol    Narrative · Estimated EF = 60%.  · Mild mitral valve regurgitation is present  · Mild tricuspid valve regurgitation is present.  · Calculated right ventricular systolic pressure from tricuspid   regurgitation is 29 mmHg.        I have reviewed the medications:  Scheduled Meds:    amLODIPine 10 mg Oral Q24H   aspirin 81 mg Oral Daily   DULoxetine 60 mg Oral Daily   heparin (porcine) 5,000 Units Subcutaneous Q8H   hydrALAZINE 25 mg Oral Q8H   insulin detemir 22 Units Subcutaneous Q12H   insulin lispro 0-7 Units Subcutaneous 4x Daily With Meals & Nightly   insulin lispro 5 Units Subcutaneous TID With Meals   metoprolol tartrate 100 mg Oral Q12H   nystatin  Topical BID   piperacillin-tazobactam 3.375 g Intravenous Q8H   saccharomyces boulardii 250 mg Oral BID   sodium chloride 10 mL Intravenous Q12H   terazosin 5 mg Oral Nightly     Continuous Infusions:   PRN Meds:  •  acetaminophen **OR** acetaminophen **OR** acetaminophen  •  dextrose  •  dextrose  •  glucagon (human recombinant)  •  loperamide  •  magnesium sulfate **OR** magnesium sulfate **OR** magnesium sulfate  •  ondansetron  •  oxyCODONE-acetaminophen  •  potassium chloride **OR** potassium chloride **OR** potassium chloride  •  Insert peripheral IV **AND** sodium chloride  •  sodium chloride    Assessment / Plan     Active Hospital Problems    Diagnosis  POA   • Pneumaturia [R39.89]  Clinically Undetermined   • Perinephric abscess [N15.1]  Yes   • Peripheral vascular disease (CMS/HCC) [I73.9]  Yes   • S/P BKA (below knee amputation), left (CMS/HCC) [Z89.512]  Not Applicable   • Diabetic ulcer of right lower leg (CMS/HCC) [E11.622, L97.919]  Yes   • Type 2 diabetes mellitus with circulatory disorder and uncontrolled [E11.59]  Yes   •  Hyperlipemia [E78.5]  Yes   • Essential hypertension [I10]  Yes      Resolved Hospital Problems   No resolved problems to display.     Brief Hospital Course to date:  eKndrick Crystal is a 49 y.o. male who presented to the ER with weakness.  Discharged 10/23 - admitted again on 10/26    High Risk for Re-admission  - discharged on 10/23 - readmit 10/26  - high LACE score  - history of compliance issues    Perinephric Abscess  - Urology following  - CRS examined patient and there was no evidence of rectal mass or abscess, no immediate need for colonoscopy   --per ID notes, last US revealed no abscess present on 11/7/19 that was discussed/ verified by radiology and ID  --Discussed with Dr Dong today - he plans to discontinue IV Zosyn tomorrow      Uncontrolled Diabetes  - long history of poorly controlled DM  - mostly A1Cs >13  - high sugars for over 2 yrs, likely longer    Obesity  - suspected undiagnosed sleep apnea    Depression  - continue Cymbalta 60 mg daily    Debility  - s/p Below Knee Amputation  - immobility  - Refusing to work with PT/OT and refusing to go to any rehab facility other than [facility]. [facility] has declined to offer him a bed.  has arrange ambulance to home for 11/14 - he is extremely high risk for readmission     Hypertension  - on Norvasc, hydralazine, metoprolol, terazosin    DVT Prophylaxis:  Heparin 5,000 Units SC every 8 hours  Disposition: I expect the patient to be discharged tomorrow     CODE STATUS:   Code Status and Medical Interventions:   Ordered at: 10/26/19 2039     Level Of Support Discussed With:    Patient     Code Status:    CPR     Medical Interventions (Level of Support Prior to Arrest):    Full     Electronically signed by Santa Cope PA-C, 11/13/19, 3:09 PM.

## 2019-11-13 NOTE — PROGRESS NOTES
[facility] Progress Note        Antibiotics:  Anti-Infectives (From admission, onward)    Ordered     Dose/Rate Route Frequency Start Stop    19 0848  fluconazole (DIFLUCAN) IVPB 200 mg     Shauna Sanford RN reviewed the order on 19 1844.   Ordering Provider:  Usman Dong MD    200 mg  100 mL/hr over 60 Minutes Intravenous Daily 19 0945 11/10/19 1133    10/30/19 0725  fluconazole (DIFLUCAN) IVPB 200 mg     Ordering Provider:  Usman Dong MD    200 mg  over 60 Minutes Intravenous Daily 10/30/19 0900 19 1028    10/28/19 0743  piperacillin-tazobactam (ZOSYN) 3.375 g in iso-osmotic dextrose 50 ml (premix)     Usman Dong MD let the order  on 19 1034.   Ordering Provider:  Usman Dong MD    3.375 g  over 4 Hours Intravenous Every 8 Hours 10/28/19 1700 19 1659    10/28/19 0743  piperacillin-tazobactam (ZOSYN) 3.375 g in iso-osmotic dextrose 50 ml (premix)     Ordering Provider:  Usman Dong MD    3.375 g  over 30 Minutes Intravenous Once 10/28/19 1100 10/28/19 1303    10/26/19 2040  meropenem (MERREM) 1 g/100 mL 0.9% NS VTB (mbp)     Ordering Provider:  Susan Infante APRN    1 g  over 30 Minutes Intravenous Once 10/26/19 2130 10/26/19 2241          CC: fatigue    HPI:    Patient is a 49 y.o.  Yr old male with history of poorly contolled T2DM, DUNLAP/liver cirrhosis, HTN, PVD/Left BKA, and multiple skin abscesses/MRSA who presented to [facility] ED with right shin wound infection summer 2018.  He was unable to get to see Dr. Martinez as his father passed away unexpectedly on 18 and he had to wait until after the .  The wound worsened becoming gangrenous and he came to [facility] ED in 2018.  He also had been hospitalized at [facility] and then transferred to  for a severe penis/scrotum infection requiring surgical debridement in summer 2018.  He received antibiotics regarding his right leg infection, generally improved  over the remainder of summer 2018 but that area had not completely healed. He was admitted April 24, 2019 with acute left lower abdominal wall redness/swelling and pain, spontaneous drainage associated with fever/chills and uncontrolled blood sugars.  4/26 I&D requiring wide debridement , severe/deep infection and transferred to [facility] on May 3 with IV Zosyn/oral doxycycline. Cultures had lactobacillus and mixed gram-positive skin sherly including alpha strep, staph epidermidis and corynebacterium. Readmitted to [facility] May 18, 2019, increasing generalized edema ultimately transitioned to oral doxycycline and healed.     He presented to the emergency room July 30, 2019 with acute rectal pain that had begun 7/27; this is associated with constipation for days, chills and nausea/dry heaving.  White blood cell count elevated, high hemoglobin A1c over 13, urinalysis with hematuria/pyuria and CT scan with 3 x 3 cm fluid collection anterior to the distal rectum and per discussion with Dr. Akbar/Jennifer, this is not in the prostate.  Colorectal surgery/urology saw him for consideration of surgical options/timing/threshold, etc..     8/2/19 I&Dper Dr Payne perirectal abscess; patient reports that he had instrumented his rectum prior to hospitalization with enema     8/3/19 urinary retention overnight requiring in/out catheterization per patient.  Creat climb     8/4/19 further creat climb; childs placed and lisinopril stopped and d/w Dr Rubio;  ?obstruction initially associated with retention postop (1200 out with I/O cath postop per nursing) -v- contrast from CT -v- lisinopril/medication/vancomycin -v- relative hypotension -v- likely combination of factors with ATN; nephrology following; vancomycin trough high and vancomycin stopped empirically 8/3 although high trough potentially accumulation as a consequence of diminishing renal function associated with retention/contrast/pressure rather than cause.  " Nonetheless, avoid for now; creatinine trending down.        8/7 in retrospect he remembers air in his urine at admit which has raised concern for possible fistula from urinary tract;  No fecaluria and culture from abscess is fecal sherly;  ?rectal-urethral fistula;  Dr Payne aware     Culture with E. coli/Klebsiella species and creatinine generally trended down, transitioned to oral antibiotics at discharge.     Readmitted August 17, 2019 with nausea/vomiting/dry heaves for 3 days.  Walker catheter was placed and CT scan of the abdomen showed:      \"Previously seen fluid collection identified inferior to the prostate gland is more increased in density on today's examination. There is wall thickening again seen throughout the bladder. Findings again are concerning for cystitis.\" per radiology     He was transitioned to oral omnicef/topical antifungal on approx 8/23/19; Noncompliant with f/u in our office     READMITTED 10/8/19 RLQ abd pain, urinary retention, nausea, dysuria and generalized fatigue     UTI by U/A, subsequent blood cultures positive for  kleb sp, urine with kleb and e coli ;  Abnormal CT abd with right perinephric fluid collection, advanced cirrhosis, urinary bladder thickening.  10/9 Aspirate of perinephric fluid collection consistent with abscess/kleb and white blood cells; 10/16/19 cytoscopy with bullous change per Dr Gutierres but no fistula per him; 10/19/19 intermittent nausea, no vomiting or dry heaves this am, intermittent dry cough broadened to zosyn with ?aspiration pneumonia evolving after dry heaves/vomiting and had intermittent diarrhea, oral vancomycin added empirically with history of prior C. Difficile; improved with IV Zosyn/oral vancomycin and he refused consideration of placement.  He insisted on home, discharged October 23 and noncompliant with outpatient therapy and outpatient follow-up October 25.  He had become fatigued such that his family could not assist him out of bed and it was " recommended by my office that he return to the emergency room October 25.  He apparently refused that but did come to the emergency room October 26.     Readmitted October 26, 2019 with generalized weakness, fatigue/malaise and nausea/vomiting/diarrhea.  CT scan revealed increased size of perinephric fluid collection per radiology.     On October 28, patient had reported increased diarrhea, at least 6 episodes overnight and watery/thin but no hematochezia melena or hematemesis.  Vomiting has subsided     10/29 drain placed    11/5/19 drain inadvertently out overnight per him    11/8 voiding cystourethrogram per urology; ultrasound abdomen with no mention of abscess per radiology    11/13/19 doing ok per him;  GI habits variable, no vomiting today; stool consistency variable and no diarrhea at present per family; no abs pain.  No dysuria hematuria or pyuria.  Denies new hesitancy urgency or flank pain.      No headache photophobia or neck stiffness.  No shortness of breath cough or hemoptysis.  No fevers chills or sweats.  No rash.  No other particular exposures     ROS:      11/13/19 No f/c/s. No vomiting. No rash. No new ADR to Abx.     Constitutional-- No Fever, chills or sweats.  Appetite diminished with generalized malaise and fatigue  Heent-- No new vision, hearing or throat complaints.  No epistaxis or oral sores.  Denies odynophagia or dysphagia.  No flashers, floaters or eye pain. No odynophagia or dysphagia. No headache, photophobia or neck stiffness.  CV-- No chest pain, palpitation or syncope  Resp-- No SOB/cough/Hemoptysis  GI-as above.  No hematochezia, melena, or hematemesis. Denies jaundice or chronic liver disease.  -- No dysuria, hematuria, or flank pain.  Denies hesitancy or flank pain.  Lymph- no swollen lymph nodes in neck/axilla or groin.   Heme- No active bruising or bleeding; no Hx of DVT or PE.  MS-- no swelling or pain in the bones or joints of arms/legs.  No new back pain.  Neuro-- No  "acute focal weakness or numbness in the arms or legs.  No seizures.     Full 12 point review of systems reviewed and negative otherwise for acute complaints, except for above      PE:     /80 (BP Location: Left arm, Patient Position: Lying)   Pulse 75   Temp 97.5 °F (36.4 °C) (Oral)   Resp 16   Ht 190.5 cm (75\")   Wt 123 kg (271 lb 8 oz)   SpO2 95%   BMI 33.94 kg/m²     GENERAL: Awake and alert, in no acute distress.   HEENT: Normocephalic, atraumatic.  PERRL. EOMI. No conjunctival injection. No icterus. Oropharynx clear without evidence of thrush or exudate. No evidence of peridontal disease.    NECK: Supple without nuchal rigidity. No mass.  LYMPH: No cervical, axillary or inguinal lymphadenopathy.  HEART: RRR; No murmur, rubs, gallops.   LUNGS: Diminished at bases to auscultation bilaterally with slight rhonchi bilateral but no wheezing or rales. Normal respiratory effort. Nonlabored. No dullness.  ABDOMEN: Soft, nontender, nondistended. Positive bowel sounds. No rebound or guarding. NO mass or HSM.  EXT:  No cyanosis, clubbing or edema. No cord.  :  erythema/sattelite lesions lesions  MSK: FROM without joint effusions noted arms/legs.    SKIN: Warm and dry without cutaneous eruptions on Inspection/palpation.    NEURO: Oriented to PPT. No focal deficits on motor/sensory exam at arms/legs.     No peripheral stigmata/phenomena of endocarditis     PICC line without obvious redness or drainage     Laboratory Data    Results from last 7 days   Lab Units 11/10/19  0904 11/07/19  0621   WBC 10*3/mm3 10.59 11.73*   HEMOGLOBIN g/dL 9.7* 9.6*   HEMATOCRIT % 32.2* 31.3*   PLATELETS 10*3/mm3 339 386     Results from last 7 days   Lab Units 11/10/19  0904   SODIUM mmol/L 140   POTASSIUM mmol/L 4.0   CHLORIDE mmol/L 104   CO2 mmol/L 25.0   BUN mg/dL 14   CREATININE mg/dL 0.59*   GLUCOSE mg/dL 202*   CALCIUM mg/dL 8.5*     Results from last 7 days   Lab Units 11/07/19  0634   ALK PHOS U/L 224*   BILIRUBIN mg/dL " <0.2*   ALT (SGPT) U/L 21   AST (SGOT) U/L 24               Estimated Creatinine Clearance: 209.3 mL/min (A) (by C-G formula based on SCr of 0.59 mg/dL (L)).      Microbiology:      Radiology:  Imaging Results (last 72 hours)     Procedure Component Value Units Date/Time    CT Abdomen Pelvis With Contrast [[id number]] Collected:  10/26/19 2035     Updated:  10/26/19 2037    Narrative:       CT ABDOMEN AND PELVIS WITH CONTRAST, 10/26/2019    HISTORY:  49-year-old male with recent history of a right perinephric fluid collection that underwent diagnostic needle aspiration 10/9/2019. He has been on IV antibiotic therapy for possible perinephric abscess. Examination is requested today for follow-up.    TECHNIQUE:  CT imaging of the abdomen and pelvis with IV contrast. Radiation dose reduction techniques included automated exposure control. Radiation audit for CT and nuclear cardiology exams in the last 12 months: 0.    ABDOMEN FINDINGS:  Rim-enhancing pericapsular or subcapsular fluid collection along the posterior margin of the lower pole right kidney has enlarged since 10/18/2019. It previously measured about 5.7 x 3.9 cm axially and currently measures 6.0 x 4.8 cm in the same location  (see axial image 48). There is associated perinephric soft tissue stranding, most likely inflammatory. The findings are compatible with clinically suspected perinephric abscess.    Both kidneys enhance normally. There is no evidence of upper urinary tract obstruction.    Liver, pancreas and spleen are within normal limits. No bile duct or pancreatic duct dilatation.    Small bowel and colon are normal in caliber and appearance without GI contrast. Normal appendix. No gastric distention, hiatal hernia or distal esophageal dilatation.    PELVIS FINDINGS:  Urinary bladder, prostate and rectum are within normal limits.    Lung base images show a tiny right pleural effusion.      Impression:       1.  Likely perinephric or subcapsular abscess  along the posterior aspect of the lower pole right kidney. This has enlarged since 10/18/2019.  2.  Normal renal parenchymal enhancement. No CT evidence of pyelonephritis at this time. No evidence of upper urinary tract obstruction.  3.  Tiny right pleural effusion.    Signer Name: Fadi Healy MD   Signed: 10/26/2019 8:35 PM   Workstation Name: [email or web address]    [facility]            Impression:      --Acute Kleb septicemia/sepsis on treatment since last admit, likely urinary source/pyelnoephritis associated with urinary retention and  with abnormal CT scan with perinephric collection/abscess and Kleb on aspirate that has persisted as of aspirate 10/29 arguing relatively sequestered focus not penetrated by systemic abx and now with drain placed 10/29;  IV  Zosyn ongoing; urology to determine any timing/option or threshold for further intervention such as surgical debridement if not responsive to drain/abx, or other functional/anatomic assessment.  DRAIN INADVERTENTLY OUT 11/4;  Monitor clinically,   D/w Dr Forbes; ultrasound done on November 8 with no mention of abscess by radiology.  Sought  radiology clarification as to whether this modality is good enough in comparison to prior CT scans to know whether abscess is better/gone versus need for different modality such as repeat CT scan to better clarify. I d/w Dr Escobedo 11/11 and he reviewed the US from 11/8 and he reports to me US is adequate for followup on this and NO current evidence for abscess, no need for CT scan at present to evaluate abscess per d/w him and given clinical improvements/radiographic improvements     --Acute perinephric abscess as above;   Urology following to help guide timing/option/threshold for further drainage (perc -v- other);  Perc drain 10/29 and kleb persisted in culture; see above regarding repeat/recent scans    --urinary frequency with cutaneous candidiasis and candiduria/pyruria, improved;  S/p  diflucan     --abnormal imaging with dry cough and probable pneumonia last admission;  At risk for aspiration with recent vomiting/dry heaves and empiric zosyn started 10/19; changed to Invanz 10/23 to help facilitate home therapy with mom and changed back to Zosyn October 28;   no productive cough at present, but if it develops, then send for culture     --acute  diarrhea 10/27-10/28 with Hx CDiff per him/family and recent empiric oral vancomycin, CDT negative and oral vancomycin stopped;  If recurrent diarrhea then consider more wrolup or empiric therapy     --Hx perirectal abscess ; Colorectal surgery, Dr. Payne previously saw and is following.  Drainage as above on August 2 with mxed Fecal sherly in culture; CT scans  with no mention of abscess on 8/18 study;   any timing/option or threshold for further GI/colorectal work-up per Dr payne/CRS     --History urinary retention.  urology following     --Hx ARF in August 2019;  ?obstruction initially associated with retention postop (1200 out with I/O cath postop per nursing) -v- contrast from CT -v- lisinopril/medication/vancomycin -v- relative hypotension -v- likely combination of factors with ATN;  vancomycin trough high and vancomycin stopped empirically 8/3 although high trough potentially accumulation as a consequence of diminishing renal function associated with retention/contrast/pressure rather than cause.  Nonetheless, avoiding for now; likely further acute kidney injury at admission August 17 associated with dehydration/prerenal/ATN etiology     --History MRSA;  Not in current culture     --Diabetes mellitus type 2, uncontrolled.  He reports the reason for this is forgetfulness in past     --History [place] and chronic liver disease/cirrhosis  per past notes     --Left BKA     --Peripheral vascular disease     --medical noncompliance and inability to care for self at home.       PLAN:      --IV Zosyn to finish soon; s/p  IV diflucan      --Check/review labs  cultures and scans     --Discussed with microbiology     --History per nursing staff      --Discussed with family, partial history per them     --Highly complex set of issues with high risk for further serious morbidity and other serious sequela     --Colorectal surgery and urology have followed; urology following to determine timing/option or threshold for further percutaneous aspiration/drainage versus other surgery regarding  Abscess and to guide further workup for air in urine     --D/w Dr Escobedo as above    **he is at end of 2 weeks IV antibiotics from drain placement on 10/29 and clinically better, repeat imaging/US no abscess per my discussion with raiology as above;  I anticipate stopping abx soon if continue clinical stability          Usman Dong MD  11/13/2019

## 2019-11-13 NOTE — PROGRESS NOTES
Continued Stay Note  [facility]     Patient Name: Kendrick Crystal  MRN: [id number]  Today's Date: 11/13/2019    Admit Date: 10/26/2019    Discharge Plan     Row Name 11/13/19 1451       Plan    Plan  Home with [facility]    Patient/Family in Agreement with Plan  yes    Plan Comments  Spoke with patient and wife at bedside. He still refuses to work with PT and will only go to [facility] for rehab. Patient states he is afraid of falling. Plan is home tomorrow with [facility]. [facility] is scheduled for 11/14 at 1945. CM will continue to follow.    Final Discharge Disposition Code  06 - home with home health care        Discharge Codes    No documentation.       Expected Discharge Date and Time     Expected Discharge Date Expected Discharge Time    Nov 15, 2019             Fadi Valentine RN

## 2019-11-13 NOTE — PLAN OF CARE
Problem: Patient Care Overview  Goal: Plan of Care Review  Outcome: Outcome(s) achieved Date Met: 11/13/19 11/13/19 1732   Coping/Psychosocial   Plan of Care Reviewed With patient;spouse   Plan of Care Review   Progress improving   OTHER   Outcome Summary Pt. has no temps. Pt. denies having abdominal pain, BP improving, no lose stools today.       11/13/19 1732   Coping/Psychosocial   Plan of Care Reviewed With patient;spouse   Plan of Care Review   Progress improving   OTHER   Outcome Summary Pt. has no temps. Pt. denies having abdominal pain, BP improving, no lose stools today. Pt. Refused PT. Plan is to DC home tomorrow with home health.

## 2019-11-14 VITALS
SYSTOLIC BLOOD PRESSURE: 148 MMHG | OXYGEN SATURATION: 90 % | WEIGHT: 271.5 LBS | BODY MASS INDEX: 33.76 KG/M2 | DIASTOLIC BLOOD PRESSURE: 90 MMHG | HEIGHT: 75 IN | TEMPERATURE: 98.2 F | RESPIRATION RATE: 18 BRPM | HEART RATE: 89 BPM

## 2019-11-14 PROBLEM — A41.9 SEPSIS AFFECTING SKIN: Status: RESOLVED | Noted: 2017-02-28 | Resolved: 2019-11-14

## 2019-11-14 PROBLEM — K75.81 LIVER CIRRHOSIS SECONDARY TO NASH (HCC): Status: ACTIVE | Noted: 2017-01-17

## 2019-11-14 PROBLEM — IMO0002 DM (DIABETES MELLITUS) TYPE II UNCONTROLLED, PERIPH VASCULAR DISORDER: Status: RESOLVED | Noted: 2019-04-25 | Resolved: 2019-11-14

## 2019-11-14 PROBLEM — N39.0 ACUTE UTI (URINARY TRACT INFECTION): Status: RESOLVED | Noted: 2019-10-08 | Resolved: 2019-11-14

## 2019-11-14 PROBLEM — E13.43 GASTROPARESIS DUE TO SECONDARY DIABETES (HCC): Status: ACTIVE | Noted: 2019-11-14

## 2019-11-14 PROBLEM — B96.1 BACTEREMIA DUE TO KLEBSIELLA PNEUMONIAE: Status: RESOLVED | Noted: 2019-10-09 | Resolved: 2019-11-14

## 2019-11-14 PROBLEM — R78.81 BACTEREMIA DUE TO KLEBSIELLA PNEUMONIAE: Status: RESOLVED | Noted: 2019-10-09 | Resolved: 2019-11-14

## 2019-11-14 PROBLEM — Z86.19 HISTORY OF CLOSTRIDIOIDES DIFFICILE COLITIS: Status: ACTIVE | Noted: 2019-11-14

## 2019-11-14 PROBLEM — F31.9 BIPOLAR DISORDER (HCC): Status: ACTIVE | Noted: 2019-11-14

## 2019-11-14 LAB
GLUCOSE BLDC GLUCOMTR-MCNC: 184 MG/DL (ref 70–130)
GLUCOSE BLDC GLUCOMTR-MCNC: 187 MG/DL (ref 70–130)
GLUCOSE BLDC GLUCOMTR-MCNC: 194 MG/DL (ref 70–130)

## 2019-11-14 PROCEDURE — 25010000002 PIPERACILLIN SOD-TAZOBACTAM PER 1 G: Performed by: INTERNAL MEDICINE

## 2019-11-14 PROCEDURE — 99239 HOSP IP/OBS DSCHRG MGMT >30: CPT | Performed by: PHYSICIAN ASSISTANT

## 2019-11-14 PROCEDURE — 63710000001 INSULIN LISPRO (HUMAN) PER 5 UNITS: Performed by: HOSPITALIST

## 2019-11-14 PROCEDURE — 25010000002 HEPARIN (PORCINE) PER 1000 UNITS: Performed by: NURSE PRACTITIONER

## 2019-11-14 PROCEDURE — 82962 GLUCOSE BLOOD TEST: CPT

## 2019-11-14 PROCEDURE — 63710000001 INSULIN DETEMIR PER 5 UNITS: Performed by: HOSPITALIST

## 2019-11-14 RX ORDER — OXYCODONE HYDROCHLORIDE AND ACETAMINOPHEN 5; 325 MG/1; MG/1
1 TABLET ORAL 2 TIMES DAILY PRN
Qty: 6 TABLET | Refills: 0 | Status: SHIPPED | OUTPATIENT
Start: 2019-11-14 | End: 2019-11-26 | Stop reason: HOSPADM

## 2019-11-14 RX ORDER — DULOXETIN HYDROCHLORIDE 60 MG/1
60 CAPSULE, DELAYED RELEASE ORAL DAILY
Qty: 90 CAPSULE | Refills: 3 | Status: SHIPPED | OUTPATIENT
Start: 2019-11-15

## 2019-11-14 RX ORDER — NYSTATIN 100000 U/G
CREAM TOPICAL 2 TIMES DAILY
Qty: 90 G | Refills: 3 | Status: SHIPPED | OUTPATIENT
Start: 2019-11-14 | End: 2020-01-01 | Stop reason: HOSPADM

## 2019-11-14 RX ORDER — HYDRALAZINE HYDROCHLORIDE 25 MG/1
25 TABLET, FILM COATED ORAL EVERY 8 HOURS SCHEDULED
Qty: 90 TABLET | Refills: 5 | Status: SHIPPED | OUTPATIENT
Start: 2019-11-14 | End: 2019-11-26 | Stop reason: HOSPADM

## 2019-11-14 RX ORDER — PEN NEEDLE, DIABETIC 30 GX3/16"
1 NEEDLE, DISPOSABLE MISCELLANEOUS
Qty: 200 EACH | Refills: 11 | Status: SHIPPED | OUTPATIENT
Start: 2019-11-14 | End: 2019-11-18

## 2019-11-14 RX ORDER — TERAZOSIN 5 MG/1
5 CAPSULE ORAL NIGHTLY
Qty: 90 CAPSULE | Refills: 3 | Status: SHIPPED | OUTPATIENT
Start: 2019-11-14 | End: 2020-01-01 | Stop reason: HOSPADM

## 2019-11-14 RX ORDER — AMLODIPINE BESYLATE 10 MG/1
10 TABLET ORAL
Qty: 90 TABLET | Refills: 3 | Status: SHIPPED | OUTPATIENT
Start: 2019-11-14

## 2019-11-14 RX ORDER — METOPROLOL TARTRATE 100 MG/1
100 TABLET ORAL EVERY 12 HOURS
Qty: 180 TABLET | Refills: 3 | Status: SHIPPED | OUTPATIENT
Start: 2019-11-14

## 2019-11-14 RX ORDER — INSULIN LISPRO 100 [IU]/ML
4 INJECTION, SOLUTION INTRAVENOUS; SUBCUTANEOUS
Qty: 6 ML | Refills: 5 | Status: SHIPPED | OUTPATIENT
Start: 2019-11-14 | End: 2020-01-01 | Stop reason: HOSPADM

## 2019-11-14 RX ADMIN — METOPROLOL TARTRATE 100 MG: 100 TABLET ORAL at 09:16

## 2019-11-14 RX ADMIN — HEPARIN SODIUM 5000 UNITS: 5000 INJECTION INTRAVENOUS; SUBCUTANEOUS at 07:38

## 2019-11-14 RX ADMIN — TAZOBACTAM SODIUM AND PIPERACILLIN SODIUM 3.38 G: 375; 3 INJECTION, SOLUTION INTRAVENOUS at 02:21

## 2019-11-14 RX ADMIN — INSULIN DETEMIR 20 UNITS: 100 INJECTION, SOLUTION SUBCUTANEOUS at 09:18

## 2019-11-14 RX ADMIN — ASPIRIN 81 MG: 81 TABLET, COATED ORAL at 09:16

## 2019-11-14 RX ADMIN — INSULIN LISPRO 2 UNITS: 100 INJECTION, SOLUTION INTRAVENOUS; SUBCUTANEOUS at 13:45

## 2019-11-14 RX ADMIN — AMLODIPINE BESYLATE 10 MG: 10 TABLET ORAL at 09:17

## 2019-11-14 RX ADMIN — INSULIN LISPRO 4 UNITS: 100 INJECTION, SOLUTION INTRAVENOUS; SUBCUTANEOUS at 19:20

## 2019-11-14 RX ADMIN — HYDRALAZINE HYDROCHLORIDE 25 MG: 25 TABLET, FILM COATED ORAL at 07:38

## 2019-11-14 RX ADMIN — INSULIN LISPRO 4 UNITS: 100 INJECTION, SOLUTION INTRAVENOUS; SUBCUTANEOUS at 13:47

## 2019-11-14 RX ADMIN — INSULIN LISPRO 4 UNITS: 100 INJECTION, SOLUTION INTRAVENOUS; SUBCUTANEOUS at 09:15

## 2019-11-14 RX ADMIN — HEPARIN SODIUM 5000 UNITS: 5000 INJECTION INTRAVENOUS; SUBCUTANEOUS at 13:43

## 2019-11-14 RX ADMIN — INSULIN LISPRO 2 UNITS: 100 INJECTION, SOLUTION INTRAVENOUS; SUBCUTANEOUS at 19:20

## 2019-11-14 RX ADMIN — DULOXETINE HYDROCHLORIDE 60 MG: 60 CAPSULE, DELAYED RELEASE ORAL at 09:16

## 2019-11-14 RX ADMIN — Medication 250 MG: at 09:17

## 2019-11-14 RX ADMIN — INSULIN LISPRO 2 UNITS: 100 INJECTION, SOLUTION INTRAVENOUS; SUBCUTANEOUS at 09:15

## 2019-11-14 RX ADMIN — NYSTATIN 1 APPLICATION: 100000 CREAM TOPICAL at 09:15

## 2019-11-14 RX ADMIN — TAZOBACTAM SODIUM AND PIPERACILLIN SODIUM 3.38 G: 375; 3 INJECTION, SOLUTION INTRAVENOUS at 09:23

## 2019-11-14 RX ADMIN — HYDRALAZINE HYDROCHLORIDE 25 MG: 25 TABLET, FILM COATED ORAL at 13:43

## 2019-11-14 NOTE — DISCHARGE PLACEMENT REQUEST
"Elliot Crystal (49 y.o. Male)   Neil Valentine, RN Case Manager  [phone]    Date of Birth Social Security Number Address Home Phone MRN    1970  [facility] [place] [address] [phone] [id number]    Amish Marital Status          Restoration        Admission Date Admission Type Admitting Provider Attending Provider Department, Room/Bed    10/26/19 Emergency Cody Naqvi MD Schnell, Aaron, MD [facility] 3F, S320/1    Discharge Date Discharge Disposition Discharge Destination         Home or Self Care              Attending Provider:  Cody Naqvi MD    Allergies:  No Known Allergies    Isolation:  None   Infection:  MRSA (03/03/17)   Code Status:  CPR    Ht:  190.5 cm (75\")   Wt:  123 kg (271 lb 8 oz)    Admission Cmt:  None   Principal Problem:  None                Active Insurance as of 10/26/2019     Primary Coverage     Payor Plan Insurance Group Employer/Plan Group    MEDICARE MEDICARE A & B      Payor Plan Address Payor Plan Phone Number Payor Plan Fax Number Effective Dates    [address] [phone]  6/1/2017 - None Entered    [facility] [id number]       Subscriber Name Subscriber Birth Date Member ID       ELLIOT CRYSTAL 1970 [id number]           Secondary Coverage     Payor Plan Insurance Group Employer/Plan Group    [place]      Payor Plan Address Payor Plan Phone Number Payor Plan Fax Number Effective Dates    [address] [phone]  10/3/2017 - None Entered    [place] [address]       Subscriber Name Subscriber Birth Date Member ID       ELLIOT CRYSTAL 1970 [id number]                 Emergency Contacts      (Rel.) Home Phone Work Phone Mobile Phone    Cindy Crystal (Spouse) [phone] -- [phone]               Discharge Summary      Santa Cope PA-C at 11/14/19 0942              [facility] Medicine Services  DISCHARGE SUMMARY    Patient Name: Elliot MADRID " Bonilla  : 1970  MRN: [id number]    Date of Admission: 10/26/2019  5:03 PM  Date of Discharge: 2019  Primary Care Physician: Provider, No Known    Consults     Date and Time Order Name Status Description    2019 1018 Inpatient Colorectal Surgery Consult Completed     10/28/2019 1832 Inpatient Diagnostic Radiology Consult      10/28/2019 0841 Inpatient Urology Consult      10/26/2019 2039 Inpatient Infectious Diseases Consult Completed     10/9/2019 1221 Inpatient Colorectal Surgery Consult Completed     10/9/2019 1052 Inpatient Infectious Diseases Consult Completed         Hospital Course     Presenting Problem:   Perinephric abscess [N15.1]  Perinephric abscess [N15.1]    Active Hospital Problems    Diagnosis  POA   • Gastroparesis due to secondary diabetes (CMS/[facility]) [E13.43]  Yes   • History of Clostridioides difficile colitis [Z86.19]  Not Applicable   • Bipolar disorder (CMS/[facility]) [F31.9]  Yes   • Pneumaturia [R39.89]  Clinically Undetermined   • Perinephric abscess [N15.1]  Yes   • Obesity (BMI 30-39.9) [E66.9]  Yes   • Peripheral vascular disease (CMS/[facility]) [I73.9]  Yes   • S/P BKA (below knee amputation), left (CMS/[facility]) [Z89.512]  Not Applicable   • Diabetic ulcer of right lower leg (CMS/[facility]) [E11.622, L97.919]  Yes   • Type 2 diabetes mellitus with circulatory disorder and uncontrolled [E11.59]  Yes   • Hypertriglyceridemia, essential [E78.1]  Yes   • Hyperlipemia [E78.5]  Yes   • Essential hypertension [I10]  Yes   • 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      Hospital Course:  Mr. Kendrick Crystal is a 49yoM with PMH significant for poorly-controlled insulin dependent DMII with diabetic peripheral neuropathy and gastroparesis, HTN, PAD, prior diabetic foot ulcers and L BKA, DUNLAP cirrhosis, prior C. Diff colitis, bipolar disorder and medical noncompliance.      - Admitted to [facility] 4/24-5/3/2019 for abdominal wall abscess. He underwent debridement by Dr. Kern and discharged to [facility] on IV Zosyn/Daptomycin per [facility] recommendations.   - Admitted 5/18-5/28/19 for abdominal wall cellulitis and anasarca associated to DUNLAP cirrhosis. He was encouraged to return to [facility] but he discharged home with home health on PO Doxycycline.   - Admitted 5/30-6/2/2019 with intractable nausea/vomiting that resolved with discontinuation of Doxycycline. He discharged home off of antibiotics. He discharged home.   - Admitted 7/30-8/14/2019 with rectal pain and concerns for rectal abscess. Dr. Payne of [facility] performed trans-rectal drainage of a claudia-rectal abscess on 8/2/19. He developed JUAN and was followed by nephrology. He refused rehab placement and discharged home on PO Flagyn/Ceftin.   - Admitted 8/17-8/23/2019 with nausea/vomiting and inability to keep PO antibiotics down. CT A/P showed increased fluid collection density, no intervention was performed by [facility] ([facility] and [facility] both evaluated). He treated with Zosyn and Micafungin (candiduria) and discharged on PO Omnicef and topical antifungal for groin candidiasis. He discharged home.   Admitted 10/8-10/23/19 for sepsis secondary to klebsiella perinephric abscess, E. Coli UTI and Klebsiella bacteremia. He discharged home on Invanz 1g IV daily and PO Vancomycin for C. Diff ppx.     He returned to [facility] ED on 10/26/2019 with complaints of increasing weakness/fatigue/malaise and diarrhea. He reported that he developed diarrhea after eating chili for dinner the night prior to presentation. Wife also admitted to missing administration of 2 of 3 days of Invanz while at home.     CT abdomen/pelvis revealed a perinephric or sub-scapular abscess along the posterior aspect of the lower pole of the R kidney, enlarge from last scan on 10/18/19. WBCs 12.45. ID was consulted from the ED. Dr. Dong recommended transition to  "Meropenem. He was admitted to the hospital medicine service and treated with IV fluid and IV antibiotics. He received C. Diff ppx with PO Vancomycin.     Urology was consulted. Dr. Gutierres recommended CT-guided drain placement. This was performed on 10/29 with drainage of 12mL of fluid. Culture returned with Klebsiella. Urology did not recommend operative intervention. Antibiotics transitioned to IV Zosyn. He was treated for candiduria with IV Diflucan.     Drain was inadvertently removed. Due to pneumaturia, a VCUG was performed on 11/8 - no extravasation of contrast was identified to indicate presence of a fistula.     Mr. Crystal has completed 2 weeks of IV antibiotics following drain placement.     PT/OT followed the patient throughout his hospitalization. He was not compliant with working with therapy and [facility] declined his admission referral. He refused referrals to any other facilities in the [facility] or other surrounding [facility] and continues to refuse to work with therapy. Despite his high-risk for readmission, Mr. Crystal has met maximum medical benefit from this hospitalization and he is medically stable. He has been cleared for discharge by urology and infectious disease. We will discharge him home on 11/14/2019.     Case management has arranged primary care follow up for 11/19/19 @ 1:15pm    Day of Discharge     HPI:   Laying in bed, wife on couch. No new issues. Patient slept during conversation with wife (lots of questions about what to do if he cannot move at home and options for rehab following discharge - \"I don't know what else to do.\") Mr. Crystal continues to refuse to work with PT or transfer anywhere other than [facility]. I explained to patient and family that we have exhausted our options because he is unwilling to cooperate with recommendations and that while I do not recommend discharge home, he has met criteria for discharge and we will no longer keep him in the hospital (nor " would it be financially prudent to do so).     Wife requested insulin pens instead of bottle/syringe, which I stated I would prescribe     Review of Systems  Gen- No fevers, chills  CV- No chest pain, palpitations  Resp- No cough, dyspnea  GI- No N/V/D, abd pain    Otherwise ROS is negative except as mentioned in the HPI.    Vital Signs:   Temp:  [97.3 °F (36.3 °C)-98.3 °F (36.8 °C)] 97.6 °F (36.4 °C)  Heart Rate:  [78-99] 94  Resp:  [18] 18  BP: (137-160)/() 157/118     Physical Exam:  Constitutional: No acute distress. In bed, blanket over head. Minimally conversant   HENT: NCAT, mucous membranes moist  Respiratory: Clear to auscultation bilaterally, respiratory effort normal   Cardiovascular: RRR, no murmurs, rubs, or gallops, palpable pedal pulses bilaterally  Gastrointestinal: Positive bowel sounds, soft, abdomen is obese   Musculoskeletal: No right ankle edema  Psychiatric: Irritated affect, minimally cooperative with exam but not combative in any way   Neurologic: Moves all extremities spontaneously. Speech is clear and no focal deficits     Pertinent  and/or Most Recent Results     Results from last 7 days   Lab Units 11/10/19  0904   WBC 10*3/mm3 10.59   HEMOGLOBIN g/dL 9.7*   HEMATOCRIT % 32.2*   PLATELETS 10*3/mm3 339   SODIUM mmol/L 140   POTASSIUM mmol/L 4.0   CHLORIDE mmol/L 104   CO2 mmol/L 25.0   BUN mg/dL 14   CREATININE mg/dL 0.59*   GLUCOSE mg/dL 202*   CALCIUM mg/dL 8.5*     Brief Urine Lab Results  (Last result in the past 365 days)      Color   Clarity   Blood   Leuk Est   Nitrite   Protein   CREAT   Urine HCG        10/28/19 1341 Yellow Cloudy Negative Moderate (2+) Negative Negative             Microbiology Results Abnormal     Procedure Component Value - Date/Time    Ova & Parasite Examination - Stool, Per Rectum [[id number]] Collected:  10/30/19 0509    Lab Status:  Final result Specimen:  Stool from Per Rectum Updated:  11/05/19 1353     Ova + Parasite Exam No ova or parasites seen on  concentrated smear     Trichrome Stain No ova or parasites seen on trichrome stain    Stool Culture (Reference Lab) - Stool, Per Rectum [[id number]] Collected:  10/30/19 0507    Lab Status:  Final result Specimen:  Stool from Per Rectum Updated:  11/04/19 1708     Salmonella/Shigella Screen Final report     Result 1 Comment     Comment: No Salmonella or Shigella recovered.        Campylobacter Culture Final report     Result 1 Comment     Comment: No Campylobacter species isolated.        E coli, Shiga toxin Assay Negative    Narrative:       Performed at:  [address] [place] [address]  [id number]  : Dl Laird PhD, Phone:  [phone]    Gastrointestinal Panel, PCR ([facility]) - Stool, Per Rectum [[id number]] Collected:  10/30/19 0507    Lab Status:  Final result Specimen:  Stool from Per Rectum Updated:  11/01/19 1357     Reference Lab Report See Attached Report     Comment: See scanned report       Blood Culture - Blood, Wrist, Right [[id number]] Collected:  10/26/19 1805    Lab Status:  Final result Specimen:  Blood from Wrist, Right Updated:  10/31/19 2000     Blood Culture No growth at 5 days    Blood Culture - Blood, Hand, Right [[id number]] Collected:  10/26/19 1822    Lab Status:  Final result Specimen:  Blood from Hand, Right Updated:  10/31/19 2000     Blood Culture No growth at 5 days    Body Fluid Culture - Body Fluid, Kidney, Right [[id number]]  (Abnormal)  (Susceptibility) Collected:  10/29/19 1057    Lab Status:  Final result Specimen:  Body Fluid from Kidney, Right Updated:  10/31/19 0624     Body Fluid Culture Scant growth (1+) Klebsiella pneumoniae ssp pneumoniae     Gram Stain Moderate (3+) WBCs seen      No organisms seen    Susceptibility      Klebsiella pneumoniae ssp pneumoniae     EYAD     Ampicillin Resistant     Ampicillin + Sulbactam Susceptible     Cefazolin Susceptible     Cefepime Susceptible     Ceftazidime Susceptible     Ceftriaxone Susceptible      Gentamicin Susceptible     Levofloxacin Susceptible     Piperacillin + Tazobactam Susceptible     Trimethoprim + Sulfamethoxazole Susceptible                    Clostridium Difficile Toxin - Stool, Per Rectum [[id number]] Collected:  10/30/19 0507    Lab Status:  Final result Specimen:  Stool from Per Rectum Updated:  10/30/19 0826    Narrative:       The following orders were created for panel order Clostridium Difficile Toxin - Stool, Per Rectum.  Procedure                               Abnormality         Status                     ---------                               -----------         ------                     Clostridium Difficile To...[[id number]]  Normal              Final result                 Please view results for these tests on the individual orders.    Clostridium Difficile Toxin, PCR - Stool, Per Rectum [[id number]]  (Normal) Collected:  10/30/19 0507    Lab Status:  Final result Specimen:  Stool from Per Rectum Updated:  10/30/19 0826     C. Difficile Toxins by PCR Not Detected    Narrative:       Performance characteristics of test not established for patients <2 years of age.  Negative for Toxigenic C. Difficile    Urine Culture - Urine, Urine, Clean Catch [[id number]]  (Abnormal) Collected:  10/28/19 1341    Lab Status:  Final result Specimen:  Urine, Clean Catch Updated:  10/29/19 1033     Urine Culture Yeast isolated        Imaging Results (All)     Procedure Component Value Units Date/Time    FL Voiding Urethrocystogram [[id number]] Collected:  11/08/19 1632     Updated:  11/10/19 1801    Narrative:       EXAMINATION: FL VOIDING URETHROCYSTOGRAM-     INDICATION: pneumaturia in pt with previous claudia-scrotal abscess.   Suspect urethral source.  cysto neg.; N15.1-Renal and perinephric  abscess; Z86.19-Personal history of other infectious and parasitic  diseases; Z87.898-Personal history of other specified conditions;  E11.51-Type 2 diabetes mellitus with diabetic peripheral angiopathy  without  gangrene; E11.65-Type 2 diabetes mellitus with hyperglycemia;  I10-Ess     TECHNIQUE: 2 minutes and 12 seconds of fluoroscopic time was used for  this exam. 5 associated images were saved.  imaging reveals a  nonobstructive bowel gas pattern. The patient arrived at the fluoroscopy  suite with a Walker catheter in situ. A single contrast study using  Cysto-Conray contrast media was performed. The bladder was filled in a  retrograde fashion.     COMPARISON: NONE     FINDINGS: Contrast was seen filling the bladder. The bladder mucosa  appeared grossly normal. No fixed filling defects were seen.  Vesicoureteral reflux was not noted during this exam. No extravasation  of contrast was seen. The urethra appeared within normal limits on  voiding images. No strictures, or mucosal irregularities of the urethra  were seen.       Impression:       Fluoroscopic guided voiding cystourethrogram series appeared  within normal limits.      This report was finalized on 11/10/2019 5:58 PM by Dr. Jb Campos.       US Abdomen Complete [[id number]] Collected:  11/08/19 0749     Updated:  11/10/19 1756    Narrative:       EXAMINATION: US ABDOMEN COMPLETE-     INDICATION: Abdominal pain and distention, history of ascites, DUNLAP  cirrhosis; N15.1-Renal and perinephric abscess; Z86.19-Personal history  of other infectious and parasitic diseases; Z87.898-Personal history of  other specified conditions; E11.51-Type 2 diabetes mellitus with  diabetic peripheral angiopathy without gangrene; E11.65-Type 2 diabetes  mellitus with hyperglycemia; I10-Essential (primary) hypertension.      TECHNIQUE: Ultrasound abdomen complete.     COMPARISON: None.     FINDINGS: Visualized portions of the aorta and IVC are grossly  unremarkable.     Visualized portions of the pancreas within normal limits.     Liver demonstrates coarsened echotexture without focal parenchymal  lesion.     Gallbladder is present without cholelithiasis, gallbladder  wall  thickening or pericholecystic fluid demonstrating minimal debris in the  dependent portion.     Common bile duct measures 2 mm in diameter at the level of the mimi  hepatis.     Right kidney measures 11 cm in length without evidence of  hydronephrosis, contour deforming mass or obvious calculi.     Left kidney measures 12 cm in length without evidence of hydronephrosis,  contour deforming mass or obvious calculi.     Spleen measures 15 cm enlarged in size with a single 1.4 cm hypoechoic  area of likely simple cyst associated.     No ascites.       Impression:       1. Mildly coarsened echotexture of the hepatic parenchyma may represent  hepatic parenchymal process. No focal liver lesion.  2. Splenomegaly measuring up to 15 cm in craniocaudal dimension.  3. No ascites.      D:  11/08/2019  E:  11/08/2019     This report was finalized on 11/10/2019 5:53 PM by Dr. Jb Campos.       US Abdomen Limited [[id number]] Collected:  10/31/19 0924     Updated:  11/01/19 0939    Narrative:       EXAMINATION: US ABDOMEN LIMITED- 10/30/2019     INDICATION: RUQ pain; N15.1-Renal and perinephric abscess;  Z86.19-Personal history of other infectious and parasitic diseases;  Z87.898-Personal history of other specified conditions; E11.51-Type 2  diabetes mellitus with diabetic peripheral angiopathy without gangrene;  E11.65-Type 2 diabetes mellitus with hyperglycemia; I10-Essential  (primary) hypertension; Z89.512-Acquired absence of left leg below knee;  Z74 upper abdominal pain     TECHNIQUE: Sonographic imaging was obtained of the right upper quadrant  in both the sagittal and transverse planes.     COMPARISON: NONE     FINDINGS: Pancreas is not well seen. No gross mass or abnormal lesion  seen within the pancreatic region. The liver is heterogeneous in  appearance. There is no focal mass or intrahepatic biliary ductal  dilatation. The gallbladder reveals no evidence of stones however the  gallbladder is somewhat difficult  to clear. There is no wall thickening.  No pericholecystic fluid. The common bile duct measures 5 mm. The right  kidney is normal in size, configuration, and texture measuring in length  from pole to pole 11.6 cm. There is no solid cortical mass or renal  cortical cysts seen within the right kidney. No hydronephrosis or  nephrolithiasis. Incidental small right pleural effusion.       Impression:       Heterogeneous appearance of the liver with no underlying  mass or intrahepatic biliary ductal dilatation. Incidental small right  pleural effusion. No stones in the gallbladder. The remainder of the  right upper quadrant ultrasound is unremarkable.      D:  10/31/2019  E:  10/31/2019     This report was finalized on 11/1/2019 9:36 AM by Dr. Nella Enriquez MD.       CT Guided Abscess Drain Kidney Renal [[id number]] Collected:  10/29/19 1214     Updated:  10/30/19 0904    Narrative:       EXAMINATION: CT GUIDED ABSCESS DRAIN KIDNEY RENAL- 10/29/2019     INDICATION: right renal abscess; N15.1-Renal and perinephric abscess;  Z86.19-Personal history of other infectious and parasitic diseases;  Z87.898-Personal history of other specified conditions; E11.51-Type 2  diabetes mellitus with diabetic peripheral angiopathy without gangrene;  E11.65-Type 2 diabetes mellitus with hyperglycemia; I10-Essential  (primary) hypertension; Z89.512-Acquired absence of left leg below     TECHNIQUE: The patient was referred to the CT fluoroscopy procedure  suite for right-sided perinephric abscess drainage and catheter  placement. Per history the patient underwent aspiration of this fluid  collection on 10/09/2019. The procedure was discussed in detail with the  patient including the risks of the procedure. Risks discussed included  bleeding, infection, damage to surrounding structures including vascular  structures, and the need for further procedure. The patient indicated  understanding and requested proceeding with the exam. A  timeout  procedure was performed and the correct patient, site, and coagulation  factors were discussed with the nursing staff present. A 25-gauge needle  anesthetized the subcutaneous tissue, where then a 22-gauge spinal  needle was then advanced through the subcutaneous tissue, abdominal  wall, and perinephric capsule where lidocaine was administered for  anesthetic purposes. At this time 1 mg of Versed and 0.25 mcg of  fentanyl was administered via intravenous by dedicated nursing staff.  Dedicated nursing staff monitored vitals throughout the entire course of  the procedure. An 18-gauge Chiba was then directed under meticulous CT  guidance into the fluid collection were aspiration of purulent material  was obtained. At this time a 034 guidewire was then advanced through the  Chiba needle and into the perinephric fluid collection. Additional  imaging confirmed placement within the perinephric fluid collection. A  small amount of wire was coiled within the subcutaneous tissue which was  easily straightened and a 7 Lao dilator was then utilized to the  abdominal wall musculature. At this time an 8.5 Lao curved catheter  was then inserted over the guidewire and inserted into the perinephric  fluid collection where there was aspiration of purulent material once  again. The guidewire was then removed. Post procedural imaging  demonstrated the coiled pigtail catheter within the fluid collection.      The drain was then sewn into place on the skin and an overlying stay fix  device was placed. A Gus-Silvestre drain was then attached to the end of  the drainage catheter where there was free flow of tannish purulent  material. Postprocedural imaging did not demonstrate evidence of an  immediate complication and the patient tolerated the procedure well. The  patient was transferred to the floor in stable condition.     The radiation dose reduction device was turned on for each scan per the  ALARA (As Low as Reasonably  Achievable) protocol.     COMPARISON: CT abdomen and pelvis 10/26/2019 and CT procedure 10/09/2019     FINDINGS: Perinephric abscess is again identified which has enlarged  from 10/09/2019 and is better fully evaluated on the 10/26/2019 exam.  The CT scan was performed for preprocedural planning purposes only.       Impression:       Successful placement of a right perinephric abscess 8.5  Portuguese drainage catheter with purulent material aspirated and sent to  pathology for further evaluation.     D:  10/29/2019  E:  10/29/2019     This report was finalized on 10/30/2019 9:01 AM by Dr. Ángel Escobedo MD.       CT Abdomen Pelvis With Contrast [[id number]] Collected:  10/26/19 2035     Updated:  10/26/19 2037    Narrative:       CT ABDOMEN AND PELVIS WITH CONTRAST, 10/26/2019    HISTORY:  49-year-old male with recent history of a right perinephric fluid collection that underwent diagnostic needle aspiration 10/9/2019. He has been on IV antibiotic therapy for possible perinephric abscess. Examination is requested today for follow-up.    TECHNIQUE:  CT imaging of the abdomen and pelvis with IV contrast. Radiation dose reduction techniques included automated exposure control. Radiation audit for CT and nuclear cardiology exams in the last 12 months: 0.    ABDOMEN FINDINGS:  Rim-enhancing pericapsular or subcapsular fluid collection along the posterior margin of the lower pole right kidney has enlarged since 10/18/2019. It previously measured about 5.7 x 3.9 cm axially and currently measures 6.0 x 4.8 cm in the same location  (see axial image 48). There is associated perinephric soft tissue stranding, most likely inflammatory. The findings are compatible with clinically suspected perinephric abscess.    Both kidneys enhance normally. There is no evidence of upper urinary tract obstruction.    Liver, pancreas and spleen are within normal limits. No bile duct or pancreatic duct dilatation.    Small bowel and colon are  normal in caliber and appearance without GI contrast. Normal appendix. No gastric distention, hiatal hernia or distal esophageal dilatation.    PELVIS FINDINGS:  Urinary bladder, prostate and rectum are within normal limits.    Lung base images show a tiny right pleural effusion.      Impression:       1.  Likely perinephric or subcapsular abscess along the posterior aspect of the lower pole right kidney. This has enlarged since 10/18/2019.  2.  Normal renal parenchymal enhancement. No CT evidence of pyelonephritis at this time. No evidence of upper urinary tract obstruction.  3.  Tiny right pleural effusion.    Signer Name: Fadi Healy MD   Signed: 10/26/2019 8:35 PM   [facility]    [facility]        Results for orders placed during the hospital encounter of 05/18/19   Adult Transthoracic Echo Complete W/ Cont if Necessary Per Protocol    Narrative · Estimated EF = 60%.  · Mild mitral valve regurgitation is present  · Mild tricuspid valve regurgitation is present.  · Calculated right ventricular systolic pressure from tricuspid   regurgitation is 29 mmHg.        Discharge Details        Discharge Medications      New Medications      Instructions Start Date   hydrALAZINE 25 MG tablet  Commonly known as:  APRESOLINE   25 mg, Oral, Every 8 Hours Scheduled      Insulin Glargine 100 UNIT/ML injection pen  Commonly known as:  LANTUS SOLOSTAR  Replaces:  LANTUS 100 UNIT/ML injection   20 Units, Subcutaneous, Every 12 Hours      Insulin Lispro (1 Unit Dial) 100 UNIT/ML solution pen-injector  Commonly known as:  HUMALOG  Replaces:  insulin lispro 100 UNIT/ML injection   4 Units, Subcutaneous, 3 Times Daily With Meals      oxyCODONE-acetaminophen 5-325 MG per tablet  Commonly known as:  PERCOCET  Replaces:  oxyCODONE-acetaminophen  MG per tablet   1 tablet, Oral, 2 Times Daily PRN      Pen Needles 30G X 8 MM misc   1 each, Does not apply, 5 Times Daily, Use to  administer insulin 5 times per day         Changes to Medications      Instructions Start Date   DULoxetine 60 MG capsule  Commonly known as:  CYMBALTA  What changed:    · medication strength  · how much to take   60 mg, Oral, Daily   Start Date:  11/15/2019        Continue These Medications      Instructions Start Date   amLODIPine 10 MG tablet  Commonly known as:  NORVASC   10 mg, Oral, Every 24 Hours Scheduled      aspirin 81 MG tablet   81 mg, Oral, Daily      metoprolol tartrate 100 MG tablet  Commonly known as:  LOPRESSOR   100 mg, Oral, Every 12 Hours      nystatin 858422 UNIT/GM cream  Commonly known as:  MYCOSTATIN   Topical, 2 Times Daily      terazosin 5 MG capsule  Commonly known as:  HYTRIN   5 mg, Oral, Nightly         Stop These Medications    ertapenem 1 gm/100ml solution IV  Commonly known as:  INVanz     insulin lispro 100 UNIT/ML injection  Commonly known as:  humaLOG  Replaced by:  Insulin Lispro (1 Unit Dial) 100 UNIT/ML solution pen-injector     LANTUS 100 UNIT/ML injection  Generic drug:  insulin glargine  Replaced by:  Insulin Glargine 100 UNIT/ML injection pen     oxyCODONE-acetaminophen  MG per tablet  Commonly known as:  PERCOCET  Replaced by:  oxyCODONE-acetaminophen 5-325 MG per tablet     saccharomyces boulardii 250 MG capsule  Commonly known as:  FLORASTOR     sevelamer 0.8 g pack packet  Commonly known as:  RENVELA     vancomycin 50 MG/ML reconstituted solution oral solution reconstituted     witch hazel-glycerin pad  Commonly known as:  TUCKS          No Known Allergies    Discharge Disposition:  Home or Self Care    Diet:  Hospital:  Diet Order   Procedures   • Diet Regular; Consistent Carbohydrate     CODE STATUS:    Code Status and Medical Interventions:   Ordered at: 10/26/19 2039     Level Of Support Discussed With:    Patient     Code Status:    CPR     Medical Interventions (Level of Support Prior to Arrest):    Full     Future Appointments   Date Time Provider  Department Center   11/19/2019  1:15 PM Stanley Carrasco DO [facility] None     Additional Instructions for the Follow-ups that You Need to Schedule     Ambulatory Referral to Home Health   As directed      Face to Face Visit Date:  11/13/2019    Follow-up provider for Plan of Care?:  I treated the patient in an acute care facility and will not continue treatment after discharge.    Follow-up provider:  URIEL COPE [[id number]]    Reason/Clinical Findings:  Perinephric abcess    Describe mobility limitations that make leaving home difficult:  Impaired mobility    Nursing/Therapeutic Services Requested:  Physical Therapy Occupational Therapy Skilled Nursing    Skilled nursing orders:  Wound care dressing/changes    Instructions:  Right ankle    PT orders:  Therapeutic exercise Gait Training Transfer training Strengthening Home safety assessment    Weight Bearing Status:  Full Weight Bearing    Occupational orders:  Activities of daily living Energy conservation Strengthening Home safety assessment    Frequency:  1 Week 1             Time Spent on Discharge: 50 minutes    Electronically signed by Uriel Cope PA-C, 11/14/19, 10:54 AM.            Electronically signed by Uriel Cope PA-C at 11/14/19 1100

## 2019-11-14 NOTE — PROGRESS NOTES
Continued Stay Note  [facility]     Patient Name: Kendrick Crystal  MRN: [id number]  Today's Date: 11/14/2019    Admit Date: 10/26/2019    Discharge Plan     Row Name 11/14/19 0927       Plan    Patient/Family in Agreement with Plan  yes    Plan Comments  Spoke with Janice and an appointment wit Dr. Stanley Carrasco has been setup for Tue. Nov. 19 at 1:15 PM. CM will continue to follow.    Final Discharge Disposition Code  06 - home with home health care    Row Name 11/14/19 0914       Plan    Plan  Home with [facility]    Patient/Family in Agreement with Plan  yes    Plan Comments  Spoke to patient and wife. Patient is going to need a leatha lift and bariatric BSC and a new PCP at discharge. Called Janice at [facility] and left message with patient's information. Called Jemal with [facility] called and ordered faxed for DME. CM will continue to follow.    Final Discharge Disposition Code  06 - home with home health care        Discharge Codes    No documentation.       Expected Discharge Date and Time     Expected Discharge Date Expected Discharge Time    Nov 15, 2019             Fadi Valentine RN

## 2019-11-14 NOTE — PROGRESS NOTES
Case Management Discharge Note    Final Note: Plan is home With [facility]. [facility] is scheduled for 19:45 today. DME from [facility] will be delivered to patient's home tomorrow. New PCP appointment made.    Destination      No service has been selected for the patient.      Durable Medical Equipment - Selection Complete      Service Provider Request Status Selected Services Address Phone Number Fax Number    [facility] [place] Selected Durable Medical Equipment [address] [facility] [id number] [phone]      Dialysis/Infusion      No service has been selected for the patient.      Home Medical Care - Selection Complete      Service Provider Request Status Selected Services Address Phone Number Fax Number    [facility] Selected Home Health Services [address] [address] [phone]      Therapy      No service has been selected for the patient.      Community Resources      No service has been selected for the patient.             Final Discharge Disposition Code: 06 - home with home health care

## 2019-11-14 NOTE — DISCHARGE PLACEMENT REQUEST
"Elliot Crystal (49 y.o. Male)   Neil Liu RN Case Management  [phone]    Date of Birth Social Security Number Address Home Phone MRN    1970  [facility] [place] [address] [phone] [id number]    [employer] Status          Worship        Admission Date Admission Type Admitting Provider Attending Provider Department, Room/Bed    10/26/19 Emergency Cody Naqvi MD Schnell, Aaron, MD [facility] 3F, S320/1    Discharge Date Discharge Disposition Discharge Destination                       Attending Provider:  Cody Naqvi MD    Allergies:  No Known Allergies    Isolation:  None   Infection:  MRSA (03/03/17)   Code Status:  CPR    Ht:  190.5 cm (75\")   Wt:  123 kg (271 lb 8 oz)    Admission Cmt:  None   Principal Problem:  None                Active Insurance as of 10/26/2019     Primary Coverage     Payor Plan Insurance Group Employer/Plan Group    MEDICARE MEDICARE A & B      Payor Plan Address Payor Plan Phone Number Payor Plan Fax Number Effective Dates    [address] [phone]  6/1/2017 - None Entered    [facility] [address]       Subscriber Name Subscriber Birth Date Member ID       ELLIOT CRYSTAL 1970 [id number]           Secondary Coverage     Payor Plan Insurance Group Employer/Plan Group    [place]      Payor Plan Address Payor Plan Phone Number Payor Plan Fax Number Effective Dates    [address] [phone]  10/3/2017 - None Entered    [place] [address]       Subscriber Name Subscriber Birth Date Member ID       ELLIOT CRYSTAL 1970 [id number]                 Emergency Contacts      (Rel.) Home Phone Work Phone Mobile Phone    Cindy Crystal (Spouse) [phone] -- [phone]        [address]  [address] [facility] [address]  Phone:  [phone]  Fax:   Date: Nov 13, 2019      Ambulatory Referral to Home Health     Patient:  Elliot Crystal MRN:  " [id number]   [address] [place] [address] [address] [place] [address] :  1970  SSN:    Phone: [phone] Sex:  M      INSURANCE PAYOR PLAN GROUP # SUBSCRIBER ID   Primary:  Secondary:    MEDICARE  [place] [id number]  [id number]      [id number]  [id number]      Referring Provider Information:  URIEL SOMMER Phone: [phone] Fax:       Referral Information:   # Visits:  1 Referral Type: Home Health [42]   Urgency:  Routine Referral Reason: Specialty Services Required   Start Date: 2019 End Date:  To be determined by Insurer   Diagnosis: History of bacteremia (Z87.898 [ICD-10-CM] V12.09 [ICD-9-CM])  DM (diabetes mellitus) type II uncontrolled, periph vascular disorder (CMS/HCC) (E11.51,E11.65 [ICD-10-CM] 250.72,443.81 [ICD-9-CM])  S/P BKA (below knee amputation), left (CMS/[facility]) (Z89.512 [ICD-10-CM] V49.75 [ICD-9-CM])  Impaired mobility and ADLs (Z74.09 [ICD-10-CM] 799.89 [ICD-9-CM])  Type 2 diabetes mellitus with diabetic peripheral angiopathy and gangrene, with long-term current use of insulin (CMS/HCC) (E11.52,Z79.4 [ICD-10-CM] 250.70,443.81,785.4,V58.67 [ICD-9-CM])  Diabetic ulcer of right lower leg (CMS/HCC) (E11.622,L97.919 [ICD-10-CM] 250.80,707.10 [ICD-9-CM])  Bacteremia due to Klebsiella pneumoniae (R78.81 [ICD-10-CM] 790.7,041.3 [ICD-9-CM])      Refer to Dept:   Refer to Provider:   Refer to Facility:       Face to Face Visit Date: 2019  Follow-up provider for Plan of Care? I treated the patient in an acute care facility and will not continue treatment after discharge.  Follow-up provider: URIEL SOMMER [[id number]]  Reason/Clinical Findings: Perinephric abcess  Describe mobility limitations that make leaving home difficult: Impaired mobility  Nursing/Therapeutic Services Requested: Physical Therapy  Nursing/Therapeutic Services Requested: Occupational Therapy  Nursing/Therapeutic Services Requested: Skilled Nursing  Skilled nursing orders: Wound care  dressing/changes  Instructions: Right ankle  PT orders: Therapeutic exercise  PT orders: Gait Training  PT orders: Transfer training  PT orders: Strengthening  PT orders: Home safety assessment  Weight Bearing Status: Full Weight Bearing  Occupational orders: Activities of daily living  Occupational orders: Energy conservation  Occupational orders: Strengthening  Occupational orders: Home safety assessment  Frequency: 1 Week 1     This document serves as a request of services and does not constitute Insurance authorization or approval of services.  To determine eligibility, please contact the members Insurance carrier to verify and review coverage.     If you have medical questions regarding this request for services. Please contact [address] at [phone] during normal business hours.       Verbal Order Mode: Verbal with readback   Authorizing Provider: Santa Cope PA-C  Authorizing Provider's NPI: [id number]     Order Entered By: Fadi Valentine RN 2019  3:12 PM     Electronically signed by: Santa Cope PA-C 2019  3:23 PM               History & Physical      Sima Zarate MD at 10/26/19 2043              [facility] Medicine Services  HISTORY AND PHYSICAL    Patient Name: Kendrick Crystal  : 1970  MRN: [id number]  Primary Care Physician: Provider, No Known  Date of admission: 10/26/2019      Subjective   Subjective     Chief Complaint:  Kendrick Crystal is a 49-year-old male, recently discharged on Wednesday due to perinephric abscess, sent into the ER today with increased weakness, diarrhea, malaise and fatigue that started yesterday.    HPI:  Kendrick Crystal is a 49 y.o. male with history of diabetes, pretension, hyperlipidemia, C. difficile, PAD with a BKA of the left lower extremity presenting with increased weakness, diarrhea, malaise and fatigue that started yesterday.  Patient was discharged on Wednesday due to perinephric  abscess on IV Invanz and oral vancomycin for C-diff.  Patient reports diarrhea after eating chili last night.  Patient states his stomach was upset and he had indigestion and dry heaves.  Patient denies diarrhea today. CT scan of the abdomen today reveals perinephric or subscapular abscess along the posterior aspect of the lower pole the right kidney that is enlarged since 10/18/2019.  ID was consulted from emergency department.  Recommendations include stopping the Invanz and starting meropenem.  Presently, patient reports chills but denies chest pain, heart palpitations, shortness of breath, nausea, vomiting, diarrhea, constipation.  Patient has a PICC line.  Today there is a diabetic ulcer on his right ankle.    Clinical Findings:   CT of abdomen demonstrates perinephric or subscapular abscess of right kidney which is larger than imaging on 10/18/2019. Patient presenting with S/S of sepsis as indicated by elevated WBC, lactic acidosis, tachycardia, known source of infection.    Review of Systems   Constitutional: Positive for activity change, chills and fatigue. Negative for diaphoresis and fever.        Increased weakness with malaise and fatigue   HENT: Negative for rhinorrhea, sinus pressure and sore throat.    Eyes: Negative.    Respiratory: Negative for cough, chest tightness and shortness of breath.    Cardiovascular: Negative for chest pain, palpitations and leg swelling.   Gastrointestinal: Positive for diarrhea, nausea and vomiting. Negative for constipation.        Right sided abd pain   Endocrine: Negative.    Genitourinary: Positive for flank pain.        Right Sided flank pain   Musculoskeletal:        Left BKA   Skin: Positive for wound.        Diabetic ulcer to the right lateral ankle   Allergic/Immunologic: Negative.    Neurological: Negative.    Hematological: Negative.    Psychiatric/Behavioral: Negative.           All other systems reviewed and are negative.     Personal History     Past  Medical History:   Diagnosis Date   • Abdominal wall cellulitis 5/20/2019   • JUAN (acute kidney injury) (CMS/HCC) 7/29/2018   • Arthritis    • Bipolar 1 disorder (CMS/HCC)    • Cellulitis of right anterior lower leg 07/29/2018    WITH MRSA   • Cirrhosis (CMS/HCC)    • Counseling for insulin pump    • Depression    • Diabetes mellitus (CMS/HCC)    • Encephalopathy, hepatic (CMS/HCC)    • H/O degenerative disc disease    • History of Lissa's gangrene     right leg/testicle   • Hyperlipemia    • Hypertension    • multiple Skin abscesses    • DUNLAP, bx showed stage IV fibrosis    • Neuropathy    • Peripheral vascular disease (CMS/HCC)    • Thrombophlebitis        Past Surgical History:   Procedure Laterality Date   • ABDOMINAL WALL ABSCESS INCISION AND DRAINAGE N/A 4/26/2019    Procedure: ABDOMINAL WALL DEBRIDEMENT;  Surgeon: Fadi Kern MD;  Location:  [facility];  Service: General   • AMPUTATION DIGIT Left 1/30/2017    Procedure: left fourth and fifth transmetatarsal toe amputation ;  Surgeon: Juancho Martinez MD;  Location:  [facility];  Service:    • BELOW KNEE AMPUTATION Left 3/2/2017    Procedure: AMPUTATION BELOW KNEE, SHMUEL;  Surgeon: Juancho Martinez MD;  Location:  [facility];  Service:    • CYSTOSCOPY N/A 10/16/2019    Procedure: CYSTOSCOPY;  Surgeon: Brad Gutierres MD;  Location:  [facility];  Service: Urology   • ENDOSCOPY     • HEMORRHOIDECTOMY N/A 8/2/2019    Procedure: EXCISION AND DRAIN PERIRECTAL ABSCESS;  Surgeon: Mumtaz Payne MD;  Location:  [facility];  Service: General   • LEG SURGERY     • TESTICLE SURGERY Right     DEBRIDEMENT FROM GANGRENE   • TONSILLECTOMY  1975       Family History: family history includes Arthritis in his father and mother; Diabetes in his brother and father; Heart attack in his father; Hyperlipidemia in his father; Hypertension in his brother, father, and mother; Kidney disease in his mother; Mental illness in his sister; Obesity in his brother; Stroke in his mother.  Otherwise pertinent FHx was reviewed and unremarkable.     Social History:  reports that he has never smoked. He has never used smokeless tobacco. He reports that he does not drink alcohol or use drugs.  Social History     Social History Narrative    Lives in [facility]       Medications:    Available home medication information reviewed.  Medications Prior to Admission   Medication Sig Dispense Refill Last Dose   • amLODIPine (NORVASC) 10 MG tablet Take 1 tablet by mouth Daily. 30 tablet 0 Taking   • aspirin 81 MG tablet Take 1 tablet by mouth Daily. 30 tablet 11 Past Month at Unknown time   • DULoxetine (CYMBALTA) 30 MG capsule Take 1 capsule by mouth Daily. 90 capsule 1 Taking   • ertapenem (INVanz) 1 g/100 mL 0.9% NS VTB (mbp) Infuse 100 mL into a venous catheter Daily for 9 doses. 900 mL 0    • insulin lispro (humaLOG) 100 UNIT/ML injection Inject 5 Units under the skin into the appropriate area as directed 4 (Four) Times a Day With Meals & at Bedtime. Plus sliding scale 60 mL 5 Taking   • LANTUS 100 UNIT/ML injection Inject 20 Units under the skin into the appropriate area as directed 2 (Two) Times a Day. 60 mL 1    • metoprolol tartrate (LOPRESSOR) 100 MG tablet Take 100 mg by mouth Every 12 (Twelve) Hours.   Taking   • nystatin (MYCOSTATIN) 081691 UNIT/GM cream Apply  topically to the appropriate area as directed 2 (Two) Times a Day. 30 g 0 Taking   • saccharomyces boulardii (FLORASTOR) 250 MG capsule Take 1 capsule by mouth 2 (Two) Times a Day. 60 capsule 0    • sevelamer (RENVELA) 0.8 g pack packet Take 1 packet by mouth 3 (Three) Times a Day With Meals. 90 packet 0 Taking   • terazosin (HYTRIN) 5 MG capsule Take 1 capsule by mouth Every Night. 30 capsule 0 Taking   • vancomycin 50 MG/ML reconstituted solution oral solution reconstituted Take 2.5 mL by mouth Every 6 (Six) Hours for 32 doses. 80 mL 0    • witch hazel-glycerin (TUCKS) pad Apply  topically to the appropriate area as directed As Needed for  Irritation. OTC  12        No Known Allergies    Objective   Objective     Vital Signs:   Temp:  [98.1 °F (36.7 °C)-98.2 °F (36.8 °C)] 98.1 °F (36.7 °C)  Heart Rate:  [] 86  Resp:  [16-17] 17  BP: (157-166)/() 164/97        Physical Exam   Constitutional: He is oriented to person, place, and time. He appears well-developed and well-nourished.   HENT:   Head: Normocephalic and atraumatic.   Eyes: Pupils are equal, round, and reactive to light. Right eye exhibits no discharge. Left eye exhibits no discharge.   Neck: Neck supple. No JVD present.   Cardiovascular: Normal rate, regular rhythm, normal heart sounds and intact distal pulses. Exam reveals no gallop and no friction rub.   No murmur heard.  Pulmonary/Chest: Effort normal and breath sounds normal. No respiratory distress.   Abdominal: Soft. Bowel sounds are normal. He exhibits no distension.   Genitourinary:   Genitourinary Comments: Right sided flank pain   Musculoskeletal: He exhibits no edema.   Left BKA   Neurological: He is alert and oriented to person, place, and time.   Skin:   Wound to the lateral right ankle   Psychiatric: He has a normal mood and affect.   Vitals reviewed.      Results Reviewed:  I have personally reviewed current lab and radiology data.    Results from last 7 days   Lab Units 10/26/19  1807   WBC 10*3/mm3 12.45*   HEMOGLOBIN g/dL 10.5*   HEMATOCRIT % 33.5*   PLATELETS 10*3/mm3 342     Results from last 7 days   Lab Units 10/26/19  1807   SODIUM mmol/L 139   POTASSIUM mmol/L 3.8   CHLORIDE mmol/L 99   CO2 mmol/L 29.0   BUN mg/dL 14   CREATININE mg/dL 0.61*   GLUCOSE mg/dL 217*   CALCIUM mg/dL 8.2*   ALT (SGPT) U/L 19   AST (SGOT) U/L 18   LACTATE mmol/L 2.2*   PROCALCITONIN ng/mL 0.12     Estimated Creatinine Clearance: 190.3 mL/min (A) (by C-G formula based on SCr of 0.61 mg/dL (L)).  Brief Urine Lab Results  (Last result in the past 365 days)      Color   Clarity   Blood   Leuk Est   Nitrite   Protein   CREAT   Urine HCG         10/08/19 2256 Yellow Cloudy Moderate (2+) Small (1+) Positive Negative             Imaging Results (last 24 hours)     Procedure Component Value Units Date/Time    CT Abdomen Pelvis With Contrast [[id number]] Collected:  10/26/19 2035     Updated:  10/26/19 2037    Narrative:       CT ABDOMEN AND PELVIS WITH CONTRAST, 10/26/2019    HISTORY:  49-year-old male with recent history of a right perinephric fluid collection that underwent diagnostic needle aspiration 10/9/2019. He has been on IV antibiotic therapy for possible perinephric abscess. Examination is requested today for follow-up.    TECHNIQUE:  CT imaging of the abdomen and pelvis with IV contrast. Radiation dose reduction techniques included automated exposure control. Radiation audit for CT and nuclear cardiology exams in the last 12 months: 0.    ABDOMEN FINDINGS:  Rim-enhancing pericapsular or subcapsular fluid collection along the posterior margin of the lower pole right kidney has enlarged since 10/18/2019. It previously measured about 5.7 x 3.9 cm axially and currently measures 6.0 x 4.8 cm in the same location  (see axial image 48). There is associated perinephric soft tissue stranding, most likely inflammatory. The findings are compatible with clinically suspected perinephric abscess.    Both kidneys enhance normally. There is no evidence of upper urinary tract obstruction.    Liver, pancreas and spleen are within normal limits. No bile duct or pancreatic duct dilatation.    Small bowel and colon are normal in caliber and appearance without GI contrast. Normal appendix. No gastric distention, hiatal hernia or distal esophageal dilatation.    PELVIS FINDINGS:  Urinary bladder, prostate and rectum are within normal limits.    Lung base images show a tiny right pleural effusion.      Impression:       1.  Likely perinephric or subcapsular abscess along the posterior aspect of the lower pole right kidney. This has enlarged since 10/18/2019.  2.   Normal renal parenchymal enhancement. No CT evidence of pyelonephritis at this time. No evidence of upper urinary tract obstruction.  3.  Tiny right pleural effusion.    Signer Name: Fadi Healy MD   Signed: 10/26/2019 8:35 PM   Workstation Name: [id number]    [facility]        Results for orders placed during the hospital encounter of 05/18/19   Adult Transthoracic Echo Complete W/ Cont if Necessary Per Protocol    Narrative · Estimated EF = 60%.  · Mild mitral valve regurgitation is present  · Mild tricuspid valve regurgitation is present.  · Calculated right ventricular systolic pressure from tricuspid   regurgitation is 29 mmHg.          Assessment/Plan   Assessment / Plan     Active Hospital Problems    Diagnosis POA   • Perinephric abscess [N15.1] Yes       Hospital Course  Kendrick Crystal is a 49 male recently discharged from the hospital with a diagnosis of a perinephric abscess.  Patient was discharged on Invanz.  Today, patient presents with weakness, malaise, fatigue.  CT scan indicated the abscess if larger than previous scan 10/18/2019. ID recommends changing Invanz to meropenem 500 mg IV every 6 hours. Patient is also presenting with S/S of sepsis.     Sepsis R/Operinephric abscess  -sepsis focus exam was completed  -Pt receiving IV hydration with  ml/hr  -Continue to monitor Lactic Acid (2.2) WBC (12.45), HR, Temp  -Blood cultures pending  -ID consult AM    Perinephric Abscess  -Infectious disease consulted from the emergency room.  Recommendations include stopping Invanz and starting meropenem 500 mg IV every 6 hours.  -Lactobacillus  -ID consult in AM    Diabetes mellitus  -Levemir 25 units twice daily  -Placed on sliding scale insulin at the mild correction will adjust as needed.    Hypertension  -Continue amlodipine 10 mg daily  -ASA 81 mg daily  -Metoprolol 100 mg bid  -Hytrin 5 mg HS    Hx of C-diff  -continue vancomycin 50mg/ml 2.5 ml q 6  hours    Depression  -continue cymbalta 30 mg daily    Wound to the right ankle  -consult wound care    DVT prophylaxis:  Heparin 5000 units SQ q 8 hours      CODE STATUS:    Code Status and Medical Interventions:   Ordered at: 10/26/19 2039     Level Of Support Discussed With:    Patient     Code Status:    CPR     Medical Interventions (Level of Support Prior to Arrest):    Full     OBSERVATION status, however if further evaluation or treatment plans warrant, status may change.  Based upon current information, I predict patient's care encounter to be less than or equal to 2 midnights.      Electronically signed by CHINTAN Armstrong, 10/26/19, 8:43 PM.        Brief Attending Admission Attestation     I have seen and examined the patient, performing an independent face-to-face diagnostic evaluation with plan of care reviewed and developed with the advanced practice clinician (APC).      Brief Summary Statement:   Kendrick Crystal is a 49 y.o. male with pMH significant for poorly controlled DM, HTN, HLD, c diff, PAD s/p BKA LLE and recent diagnosis pf perinephric abscess.  The patient was discharged on 10/23/2019 home on INVANZ and oral vancomycin.  The patient reports progressive weakness since discharge.  He has not had any fevers noted but does report chills.  He reports some diarrhea last night, but none today.      Here in the ER, CT of abdomen shows right kidney perinephric versus subcapsular fluid collection slightly larger on today's imaging than previous.  WBC 12.45K, lactic acid 2.2.  Mild tachycardia.      ID was consulted by phone by the ER and recommended changing INVANZ to Zosyn.  UA is pending.     Remainder of detailed HPI is as noted above and has been reviewed and/or edited by me for completeness.      Attending Physical Exam:  Constitutional: Awake, alert  Eyes: PERRLA, sclerae anicteric, no conjunctival injection  HENT: NCAT, mucous membranes moist  Neck: Supple, no thyromegaly, no  lymphadenopathy, trachea midline  Respiratory: Clear to auscultation bilaterally, nonlabored respirations   Cardiovascular: RRR, no murmurs, rubs, or gallops, palpable pedal pulse left  Gastrointestinal: Positive bowel sounds, soft, nontender, nondistended  Musculoskeletal: :eft BKA  Psychiatric: Appropriate affect, cooperative  Neurologic: Oriented x 3, strength symmetric in all extremities, Cranial Nerves grossly intact to confrontation, speech clear  Skin: skin over right lateral malleolus - ulcer        Brief Assessment/Plan :  See above for further detailed assessment and plan developed with APC which I have reviewed and/or edited for completeness.      Electronically signed by Sima Zarate MD, 10/26/19, 11:07 PM.           Electronically signed by Sima Zarate MD at 10/26/19 2251       Vital Signs (last day)     Date/Time   Temp   Temp src   Pulse   Resp   BP   Patient Position   SpO2    11/14/19 0759   97.6 (36.4)   Oral   94   18   157/118  (Abnormal)    Lying   --    11/14/19 0738   --   --   99   --   --   --   --    11/14/19 0300   98.3 (36.8)   Oral   98   18   159/95   Lying   --    11/13/19 2123   --   --   88   --   --   --   --    11/13/19 1900   98.1 (36.7)   Oral   90   18   160/95   Lying   --    11/13/19 1602   97.4 (36.3)   Oral   --   18   137/90   Lying   97    11/13/19 1135   97.3 (36.3)   Oral   78   18   149/92   Lying   95    11/13/19 1130   --   --   80   --   147/102  (Abnormal)    --   --    11/13/19 0919   97.4 (36.3)   Oral   95   18   152/96   Lying   95    11/13/19 0800   --   --   76   --   --   --   --    11/13/19 0557   --   --   75   --   --   --   --    11/13/19 0408   97.5 (36.4)   Oral   74   16   117/80   Lying   --              Hospital Medications (active)       Dose Frequency Start End    acetaminophen (TYLENOL) 160 MG/5ML solution 650 mg 650 mg Every 4 Hours PRN 10/26/2019     Sig - Route: Take 20.3 mL by mouth Every 4 (Four) Hours As Needed for Mild Pain . -  "Oral    Linked Group 1:  \"Or\" Linked Group Details        acetaminophen (TYLENOL) suppository 650 mg 650 mg Every 4 Hours PRN 10/26/2019     Sig - Route: Insert 1 suppository into the rectum Every 4 (Four) Hours As Needed for Mild Pain . - Rectal    Linked Group 1:  \"Or\" Linked Group Details        acetaminophen (TYLENOL) tablet 650 mg 650 mg Every 4 Hours PRN 10/26/2019     Sig - Route: Take 2 tablets by mouth Every 4 (Four) Hours As Needed for Mild Pain . - Oral    Linked Group 1:  \"Or\" Linked Group Details        amLODIPine (NORVASC) tablet 10 mg 10 mg Every 24 Hours Scheduled 10/27/2019     Sig - Route: Take 1 tablet by mouth Daily. - Oral    aspirin EC tablet 81 mg 81 mg Daily 10/27/2019     Sig - Route: Take 1 tablet by mouth Daily. - Oral    dextrose (D50W) 25 g/ 50mL Intravenous Solution 25 g 25 g Every 15 Minutes PRN 10/26/2019     Sig - Route: Infuse 50 mL into a venous catheter Every 15 (Fifteen) Minutes As Needed for Low Blood Sugar (Blood Sugar Less Than 70). - Intravenous    dextrose (GLUTOSE) oral gel 15 g 15 g Every 15 Minutes PRN 10/26/2019     Sig - Route: Take 15 application by mouth Every 15 (Fifteen) Minutes As Needed for Low Blood Sugar (Blood sugar less than 70). - Oral    DULoxetine (CYMBALTA) DR capsule 60 mg 60 mg Daily 11/7/2019     Sig - Route: Take 1 capsule by mouth Daily. - Oral    glucagon (human recombinant) (GLUCAGEN DIAGNOSTIC) injection 1 mg 1 mg Every 15 Minutes PRN 10/26/2019     Sig - Route: Inject 1 mg under the skin into the appropriate area as directed Every 15 (Fifteen) Minutes As Needed for Low Blood Sugar (Blood Glucose Less Than 70). - Subcutaneous    heparin (porcine) 5000 UNIT/ML injection 5,000 Units 5,000 Units Every 8 Hours Scheduled 10/26/2019     Sig - Route: Inject 1 mL under the skin into the appropriate area as directed Every 8 (Eight) Hours. - Subcutaneous    hydrALAZINE (APRESOLINE) tablet 25 mg 25 mg Every 8 Hours Scheduled 11/7/2019     Sig - Route: Take 1 " "tablet by mouth Every 8 (Eight) Hours. - Oral    insulin detemir (LEVEMIR) injection 20 Units 20 Units Every 12 Hours Scheduled 11/13/2019     Sig - Route: Inject 20 Units under the skin into the appropriate area as directed Every 12 (Twelve) Hours. - Subcutaneous    insulin lispro (humaLOG) injection 0-7 Units 0-7 Units 4 Times Daily With Meals & Nightly 10/28/2019     Sig - Route: Inject 0-7 Units under the skin into the appropriate area as directed 4 (Four) Times a Day With Meals & at Bedtime. - Subcutaneous    insulin lispro (humaLOG) injection 4 Units 4 Units 3 Times Daily With Meals 11/13/2019     Sig - Route: Inject 4 Units under the skin into the appropriate area as directed 3 (Three) Times a Day With Meals. - Subcutaneous    loperamide (IMODIUM) capsule 2 mg 2 mg Daily PRN 11/10/2019     Sig - Route: Take 1 capsule by mouth Daily As Needed for Diarrhea. - Oral    Magnesium Sulfate 2 gram / 50mL Infusion (GIVE X 3 BAGS TO EQUAL 6GM TOTAL DOSE) - Mg 1.1 - 1.5 mg/dl 2 g As Needed 10/27/2019     Sig - Route: Infuse 50 mL into a venous catheter As Needed (See Administration Instructions). - Intravenous    Linked Group 2:  \"Or\" Linked Group Details        Magnesium Sulfate 2 gram Bolus, followed by 8 gram infusion (total Mg dose 10 grams)- Mg less than or equal to 1mg/dL 2 g As Needed 10/27/2019     Sig - Route: Infuse 50 mL into a venous catheter As Needed (See Administration Instructions). - Intravenous    Linked Group 2:  \"Or\" Linked Group Details        Magnesium Sulfate 4 gram infusion- Mg 1.6-1.9 mg/dL 4 g As Needed 10/27/2019     Sig - Route: Infuse 100 mL into a venous catheter As Needed (See Administration Instructions). - Intravenous    Linked Group 2:  \"Or\" Linked Group Details        metoprolol tartrate (LOPRESSOR) tablet 100 mg 100 mg Every 12 Hours 10/26/2019     Sig - Route: Take 1 tablet by mouth Every 12 (Twelve) Hours. - Oral    nystatin (MYCOSTATIN) [id number] UNIT/GM cream  2 Times Daily " "10/26/2019     Sig - Route: Apply  topically to the appropriate area as directed 2 (Two) Times a Day. - Topical    ondansetron (ZOFRAN) injection 4 mg 4 mg Every 6 Hours PRN 10/26/2019     Sig - Route: Infuse 2 mL into a venous catheter Every 6 (Six) Hours As Needed for Nausea or Vomiting. - Intravenous    oxyCODONE-acetaminophen (PERCOCET) 5-325 MG per tablet 1 tablet 1 tablet 2 Times Daily PRN 11/8/2019 11/18/2019    Sig - Route: Take 1 tablet by mouth 2 (Two) Times a Day As Needed for Severe Pain . - Oral    piperacillin-tazobactam (ZOSYN) 3.375 g in iso-osmotic dextrose 50 ml (premix) 3.375 g Every 8 Hours 10/28/2019 11/14/2019    Sig - Route: Infuse 50 mL into a venous catheter Every 8 (Eight) Hours. - Intravenous    potassium chloride (KLOR-CON) packet 40 mEq 40 mEq As Needed 10/27/2019     Sig - Route: Take 40 mEq by mouth As Needed (potassium replacement, see admin instructions). - Oral    Linked Group 3:  \"Or\" Linked Group Details        potassium chloride (MICRO-K) CR capsule 40 mEq 40 mEq As Needed 10/27/2019     Sig - Route: Take 4 capsules by mouth As Needed (Potassium Replacement.  See Admin Instructions). - Oral    Linked Group 3:  \"Or\" Linked Group Details        potassium chloride 10 mEq in 100 mL IVPB 10 mEq Every 1 Hour PRN 10/27/2019     Sig - Route: Infuse 100 mL into a venous catheter Every 1 (One) Hour As Needed (Potassium Replacement - See Admin Instructions). - Intravenous    Linked Group 3:  \"Or\" Linked Group Details        saccharomyces boulardii (FLORASTOR) capsule 250 mg 250 mg 2 Times Daily 10/26/2019     Sig - Route: Take 1 capsule by mouth 2 (Two) Times a Day. - Oral    sodium chloride 0.9 % flush 10 mL 10 mL As Needed 10/26/2019     Sig - Route: Infuse 10 mL into a venous catheter As Needed for Line Care. - Intravenous    Linked Group 4:  \"And\" Linked Group Details        sodium chloride 0.9 % flush 10 mL 10 mL Every 12 Hours Scheduled 10/26/2019     Sig - Route: Infuse 10 mL into a " venous catheter Every 12 (Twelve) Hours. - Intravenous    sodium chloride 0.9 % flush 10 mL 10 mL As Needed 10/26/2019     Sig - Route: Infuse 10 mL into a venous catheter As Needed for Line Care. - Intravenous    terazosin (HYTRIN) capsule 5 mg 5 mg Nightly 10/26/2019     Sig - Route: Take 1 capsule by mouth Every Night. - Oral    insulin detemir (LEVEMIR) injection 22 Units (Discontinued) 22 Units Every 12 Hours Scheduled 10/31/2019 2019    Sig - Route: Inject 22 Units under the skin into the appropriate area as directed Every 12 (Twelve) Hours. - Subcutaneous    insulin lispro (humaLOG) injection 5 Units (Discontinued) 5 Units 3 Times Daily With Meals 11/3/2019 2019    Sig - Route: Inject 5 Units under the skin into the appropriate area as directed 3 (Three) Times a Day With Meals. - Subcutaneous             Physician Progress Notes (last 24 hours) (Notes from 19 0847 through 19 0847)      Usman Dong MD at 19 0733          [facility] Progress Note        Antibiotics:  Anti-Infectives (From admission, onward)    Ordered     Dose/Rate Route Frequency Start Stop    19 0848  fluconazole (DIFLUCAN) IVPB 200 mg     Shauna Sanford RN reviewed the order on 19 1844.   Ordering Provider:  Usman Dong MD    200 mg  100 mL/hr over 60 Minutes Intravenous Daily 19 0945 11/10/19 1133    10/30/19 0725  fluconazole (DIFLUCAN) IVPB 200 mg     Ordering Provider:  Usman Dong MD    200 mg  over 60 Minutes Intravenous Daily 10/30/19 0900 19 1028    10/28/19 0743  piperacillin-tazobactam (ZOSYN) 3.375 g in iso-osmotic dextrose 50 ml (premix)     Usman Dong MD let the order  on 19 1034.   Ordering Provider:  Usman Dong MD    3.375 g  over 4 Hours Intravenous Every 8 Hours 10/28/19 1700 19 1659    10/28/19 0743  piperacillin-tazobactam (ZOSYN) 3.375 g in iso-osmotic dextrose 50 ml (premix)     Ordering Provider:  Iker  Usman REEDER MD    3.375 g  over 30 Minutes Intravenous Once 10/28/19 1100 10/28/19 1303    10/26/19 2040  meropenem (MERREM) 1 g/100 mL 0.9% NS VTB (mbp)     Ordering Provider:  Susan Infante APRN    1 g  over 30 Minutes Intravenous Once 10/26/19 2130 10/26/19 2241          CC: fatigue    HPI:    Patient is a 49 y.o.  Yr old male with history of poorly contolled T2DM, DUNLAP/liver cirrhosis, HTN, PVD/Left BKA, and multiple skin abscesses/MRSA who presented to [facility] ED with right shin wound infection summer 2018.  He was unable to get to see Dr. Martinez as his father passed away unexpectedly on 18 and he had to wait until after the .  The wound worsened becoming gangrenous and he came to [facility] ED in 2018.  He also had been hospitalized at [facility] and then transferred to  for a severe penis/scrotum infection requiring surgical debridement in summer 2018.  He received antibiotics regarding his right leg infection, generally improved over the remainder of summer 2018 but that area had not completely healed. He was admitted 2019 with acute left lower abdominal wall redness/swelling and pain, spontaneous drainage associated with fever/chills and uncontrolled blood sugars.   I&D requiring wide debridement , severe/deep infection and transferred to [facility] on May 3 with IV Zosyn/oral doxycycline. Cultures had lactobacillus and mixed gram-positive skin sherly including alpha strep, staph epidermidis and corynebacterium. Readmitted to [facility] May 18, 2019, increasing generalized edema ultimately transitioned to oral doxycycline and healed.     He presented to the emergency room 2019 with acute rectal pain that had begun ; this is associated with constipation for days, chills and nausea/dry heaving.  White blood cell count elevated, high hemoglobin A1c over 13, urinalysis with hematuria/pyuria and CT scan with 3 x 3 cm fluid collection  "anterior to the distal rectum and per discussion with Dr. Akbar/Jennifer, this is not in the prostate.  Colorectal surgery/urology saw him for consideration of surgical options/timing/threshold, etc..     8/2/19 I&Dper Dr Payne perirectal abscess; patient reports that he had instrumented his rectum prior to hospitalization with enema     8/3/19 urinary retention overnight requiring in/out catheterization per patient.  Creat climb     8/4/19 further creat climb; childs placed and lisinopril stopped and d/w Dr Rubio;  ?obstruction initially associated with retention postop (1200 out with I/O cath postop per nursing) -v- contrast from CT -v- lisinopril/medication/vancomycin -v- relative hypotension -v- likely combination of factors with ATN; nephrology following; vancomycin trough high and vancomycin stopped empirically 8/3 although high trough potentially accumulation as a consequence of diminishing renal function associated with retention/contrast/pressure rather than cause.  Nonetheless, avoid for now; creatinine trending down.        8/7 in retrospect he remembers air in his urine at admit which has raised concern for possible fistula from urinary tract;  No fecaluria and culture from abscess is fecal sherly;  ?rectal-urethral fistula;  Dr Payne aware     Culture with E. coli/Klebsiella species and creatinine generally trended down, transitioned to oral antibiotics at discharge.     Readmitted August 17, 2019 with nausea/vomiting/dry heaves for 3 days.  Childs catheter was placed and CT scan of the abdomen showed:      \"Previously seen fluid collection identified inferior to the prostate gland is more increased in density on today's examination. There is wall thickening again seen throughout the bladder. Findings again are concerning for cystitis.\" per radiology     He was transitioned to oral omnicef/topical antifungal on approx 8/23/19; Noncompliant with f/u in our office     READMITTED 10/8/19 RLQ abd pain, urinary " retention, nausea, dysuria and generalized fatigue     UTI by U/A, subsequent blood cultures positive for  kleb sp, urine with kleb and e coli ;  Abnormal CT abd with right perinephric fluid collection, advanced cirrhosis, urinary bladder thickening.  10/9 Aspirate of perinephric fluid collection consistent with abscess/kleb and white blood cells; 10/16/19 cytoscopy with bullous change per Dr Gutierres but no fistula per him; 10/19/19 intermittent nausea, no vomiting or dry heaves this am, intermittent dry cough broadened to zosyn with ?aspiration pneumonia evolving after dry heaves/vomiting and had intermittent diarrhea, oral vancomycin added empirically with history of prior C. Difficile; improved with IV Zosyn/oral vancomycin and he refused consideration of placement.  He insisted on home, discharged October 23 and noncompliant with outpatient therapy and outpatient follow-up October 25.  He had become fatigued such that his family could not assist him out of bed and it was recommended by my office that he return to the emergency room October 25.  He apparently refused that but did come to the emergency room October 26.     Readmitted October 26, 2019 with generalized weakness, fatigue/malaise and nausea/vomiting/diarrhea.  CT scan revealed increased size of perinephric fluid collection per radiology.     On October 28, patient had reported increased diarrhea, at least 6 episodes overnight and watery/thin but no hematochezia melena or hematemesis.  Vomiting has subsided     10/29 drain placed    11/5/19 drain inadvertently out overnight per him    11/8 voiding cystourethrogram per urology; ultrasound abdomen with no mention of abscess per radiology    11/14/19 doing ok per him;  GI habits variable, no vomiting today; stool consistency variable and no diarrhea at present per family; no abs pain.  No dysuria hematuria or pyuria.  Denies new hesitancy urgency or flank pain.      No headache photophobia or neck  "stiffness.  No shortness of breath cough or hemoptysis.  No fevers chills or sweats.  No rash.  No other particular exposures     ROS:      11/14/19 No f/c/s. No vomiting. No rash. No new ADR to Abx.     Constitutional-- No Fever, chills or sweats.  Appetite diminished with generalized malaise and fatigue  Heent-- No new vision, hearing or throat complaints.  No epistaxis or oral sores.  Denies odynophagia or dysphagia.  No flashers, floaters or eye pain. No odynophagia or dysphagia. No headache, photophobia or neck stiffness.  CV-- No chest pain, palpitation or syncope  Resp-- No SOB/cough/Hemoptysis  GI-as above.  No hematochezia, melena, or hematemesis. Denies jaundice or chronic liver disease.  -- No dysuria, hematuria, or flank pain.  Denies hesitancy or flank pain.  Lymph- no swollen lymph nodes in neck/axilla or groin.   Heme- No active bruising or bleeding; no Hx of DVT or PE.  MS-- no swelling or pain in the bones or joints of arms/legs.  No new back pain.  Neuro-- No acute focal weakness or numbness in the arms or legs.  No seizures.     Full 12 point review of systems reviewed and negative otherwise for acute complaints, except for above      PE:     /95 (BP Location: Left arm, Patient Position: Lying)   Pulse 98   Temp 98.3 °F (36.8 °C) (Oral)   Resp 18   Ht 190.5 cm (75\")   Wt 123 kg (271 lb 8 oz)   SpO2 97%   BMI 33.94 kg/m²      GENERAL: Awake and alert, in no acute distress.   HEENT: Normocephalic, atraumatic.  PERRL. EOMI. No conjunctival injection. No icterus. Oropharynx clear without evidence of thrush or exudate. No evidence of peridontal disease.    NECK: Supple without nuchal rigidity. No mass.  LYMPH: No cervical, axillary or inguinal lymphadenopathy.  HEART: RRR; No murmur, rubs, gallops.   LUNGS: Diminished at bases to auscultation bilaterally with slight rhonchi bilateral but no wheezing or rales. Normal respiratory effort. Nonlabored. No dullness.  ABDOMEN: Soft, nontender, " nondistended. Positive bowel sounds. No rebound or guarding. NO mass or HSM.  EXT:  No cyanosis, clubbing or edema. No cord.  :  erythema/sattelite lesions lesions  MSK: FROM without joint effusions noted arms/legs.    SKIN: Warm and dry without cutaneous eruptions on Inspection/palpation.    NEURO: Oriented to PPT. No focal deficits on motor/sensory exam at arms/legs.     No peripheral stigmata/phenomena of endocarditis     PICC line without obvious redness or drainage     Laboratory Data    Results from last 7 days   Lab Units 11/10/19  0904   WBC 10*3/mm3 10.59   HEMOGLOBIN g/dL 9.7*   HEMATOCRIT % 32.2*   PLATELETS 10*3/mm3 339     Results from last 7 days   Lab Units 11/10/19  0904   SODIUM mmol/L 140   POTASSIUM mmol/L 4.0   CHLORIDE mmol/L 104   CO2 mmol/L 25.0   BUN mg/dL 14   CREATININE mg/dL 0.59*   GLUCOSE mg/dL 202*   CALCIUM mg/dL 8.5*                   Estimated Creatinine Clearance: 209.3 mL/min (A) (by C-G formula based on SCr of 0.59 mg/dL (L)).      Microbiology:      Radiology:  Imaging Results (last 72 hours)     Procedure Component Value Units Date/Time    CT Abdomen Pelvis With Contrast [[id number]] Collected:  10/26/19 2035     Updated:  10/26/19 2037    Narrative:       CT ABDOMEN AND PELVIS WITH CONTRAST, 10/26/2019    HISTORY:  49-year-old male with recent history of a right perinephric fluid collection that underwent diagnostic needle aspiration 10/9/2019. He has been on IV antibiotic therapy for possible perinephric abscess. Examination is requested today for follow-up.    TECHNIQUE:  CT imaging of the abdomen and pelvis with IV contrast. Radiation dose reduction techniques included automated exposure control. Radiation audit for CT and nuclear cardiology exams in the last 12 months: 0.    ABDOMEN FINDINGS:  Rim-enhancing pericapsular or subcapsular fluid collection along the posterior margin of the lower pole right kidney has enlarged since 10/18/2019. It previously measured about 5.7 x  3.9 cm axially and currently measures 6.0 x 4.8 cm in the same location  (see axial image 48). There is associated perinephric soft tissue stranding, most likely inflammatory. The findings are compatible with clinically suspected perinephric abscess.    Both kidneys enhance normally. There is no evidence of upper urinary tract obstruction.    Liver, pancreas and spleen are within normal limits. No bile duct or pancreatic duct dilatation.    Small bowel and colon are normal in caliber and appearance without GI contrast. Normal appendix. No gastric distention, hiatal hernia or distal esophageal dilatation.    PELVIS FINDINGS:  Urinary bladder, prostate and rectum are within normal limits.    Lung base images show a tiny right pleural effusion.      Impression:       1.  Likely perinephric or subcapsular abscess along the posterior aspect of the lower pole right kidney. This has enlarged since 10/18/2019.  2.  Normal renal parenchymal enhancement. No CT evidence of pyelonephritis at this time. No evidence of upper urinary tract obstruction.  3.  Tiny right pleural effusion.    Signer Name: Fadi Healy MD   Signed: 10/26/2019 8:35 PM   Workstation Name: [email or web address]    [facility]            Impression:      --Acute Kleb septicemia/sepsis on treatment since last admit, likely urinary source/pyelnoephritis associated with urinary retention and  with abnormal CT scan with perinephric collection/abscess and Kleb on aspirate that has persisted as of aspirate 10/29 arguing relatively sequestered focus not penetrated by systemic abx and now with drain placed 10/29;  IV  Zosyn ongoing; urology to determine any timing/option or threshold for further intervention such as surgical debridement if not responsive to drain/abx, or other functional/anatomic assessment.  DRAIN INADVERTENTLY OUT 11/4;  Monitor clinically,   D/w Dr Forbes; ultrasound done on November 8 with no mention of abscess by  radiology.  Sought  radiology clarification as to whether this modality is good enough in comparison to prior CT scans to know whether abscess is better/gone versus need for different modality such as repeat CT scan to better clarify. I d/w Dr Escobedo 11/11 and he reviewed the US from 11/8 and he reports to me US is adequate for followup on this and NO current evidence for abscess, no need for CT scan at present to evaluate abscess per d/w him and given clinical improvements/radiographic improvements     --Acute perinephric abscess as above;   Urology following to help guide timing/option/threshold for further drainage (perc -v- other);  Perc drain 10/29 and kleb persisted in culture; see above regarding repeat/recent scans    --urinary frequency with cutaneous candidiasis and candiduria/pyruria, improved;  S/p diflucan     --abnormal imaging with dry cough and probable pneumonia last admission;  At risk for aspiration with recent vomiting/dry heaves and empiric zosyn started 10/19; changed to Invanz 10/23 to help facilitate home therapy with mom and changed back to Zosyn October 28;   no productive cough at present, but if it develops, then send for culture     --acute  diarrhea 10/27-10/28 with Hx CDiff per him/family and recent empiric oral vancomycin, CDT negative and oral vancomycin stopped;  If recurrent diarrhea then consider more wrolup or empiric therapy     --Hx perirectal abscess ; Colorectal surgery, Dr. Payne previously saw and is following.  Drainage as above on August 2 with mxed Fecal sherly in culture; CT scans  with no mention of abscess on 8/18 study;   any timing/option or threshold for further GI/colorectal work-up per Dr payne/JACKSON     --History urinary retention.  urology following     --Hx ARF in August 2019;  ?obstruction initially associated with retention postop (1200 out with I/O cath postop per nursing) -v- contrast from CT -v- lisinopril/medication/vancomycin -v- relative hypotension -v-  likely combination of factors with ATN;  vancomycin trough high and vancomycin stopped empirically 8/3 although high trough potentially accumulation as a consequence of diminishing renal function associated with retention/contrast/pressure rather than cause.  Nonetheless, avoiding for now; likely further acute kidney injury at admission  associated with dehydration/prerenal/ATN etiology     --History MRSA;  Not in current culture     --Diabetes mellitus type 2, uncontrolled.  He reports the reason for this is forgetfulness in past     --History [place] and chronic liver disease/cirrhosis  per past notes     --Left BKA     --Peripheral vascular disease     --medical noncompliance and inability to care for self at home.       PLAN:      --IV Zosyn to finish today; s/p  IV diflucan      --Check/review labs cultures and scans     --Discussed with microbiology     --History per nursing staff      --Discussed with family, partial history per them     --Highly complex set of issues with high risk for further serious morbidity and other serious sequela     --Colorectal surgery and urology have followed; urology following to determine timing/option or threshold for further percutaneous aspiration/drainage versus other surgery regarding  Abscess and to guide further workup for air in urine     --D/w Dr Escobedo as above    **he is at end of 2 weeks IV antibiotics from drain placement on 10/29 and clinically better, repeat imaging/US no abscess per my discussion with raiology as above        Usman Dong MD  2019        Electronically signed by Usman Dong MD at 19 0733     Santa Cope PA-C at 19 1505              [facility] Medicine Services  PROGRESS NOTE    Patient Name: Kendrick Crystal  : 1970  MRN: [id number]    Date of Admission: 10/26/2019  Primary Care Physician: Provider, No Known    Subjective     CC: f/u weakness    HPI:  Laying in bed.  "Wife in recliner. Fell asleep multiple times during interview - wife apologetic. I became firm with the patient, explaining to him that PT/OT had signed off and were no longer working with him due to his refusal to work with them. He stated that he would only go to [facility] - I noted that [facility] had declined and offered him other rehab facility options, which he refused. \"I'll just go home then.\"     Review of Systems  Gen- No fevers, chills  CV- No chest pain, palpitations  Resp- No cough, dyspnea  GI- No N/V/D, abd pain    Objective     Vital Signs:   Temp:  [97.3 °F (36.3 °C)-97.8 °F (36.6 °C)] 97.3 °F (36.3 °C)  Heart Rate:  [74-95] 78  Resp:  [14-18] 18  BP: (117-160)/() 149/92        Physical Exam:  Constitutional: No acute distress, lying with eyes closed in bed, falls asleep easily. Wife sitting up in chair   HENT: NCAT, mucous membranes moist  Respiratory: Clear to auscultation bilaterally, respiratory effort normal   Cardiovascular: RRR, s1 and s2  Gastrointestinal: Positive bowel sounds, soft, nontender, nondistended  Musculoskeletal: No right lower extremity edema, left AKA  Psychiatric: Flat affect, minimally cooperative  Neurologic: Oriented x 3, strength symmetric in all extremities, Cranial Nerves grossly intact to confrontation, speech clear  Skin: No rashes    Results Reviewed:    Results from last 7 days   Lab Units 11/10/19  0904 11/07/19  0621   WBC 10*3/mm3 10.59 11.73*   HEMOGLOBIN g/dL 9.7* 9.6*   HEMATOCRIT % 32.2* 31.3*   PLATELETS 10*3/mm3 339 386     Results from last 7 days   Lab Units 11/10/19  0904 11/07/19  0634   SODIUM mmol/L 140 136   POTASSIUM mmol/L 4.0 3.8   CHLORIDE mmol/L 104 98   CO2 mmol/L 25.0 25.0   BUN mg/dL 14 10   CREATININE mg/dL 0.59* 0.62*   GLUCOSE mg/dL 202* 186*   CALCIUM mg/dL 8.5* 8.5*   ALT (SGPT) U/L  --  21   AST (SGOT) U/L  --  24     Estimated Creatinine Clearance: 209.3 mL/min (A) (by C-G formula based on SCr of 0.59 mg/dL " (L)).    Microbiology Results Abnormal     Procedure Component Value - Date/Time    Ova & Parasite Examination - Stool, Per Rectum [[id number]] Collected:  10/30/19 0507    Lab Status:  Final result Specimen:  Stool from Per Rectum Updated:  11/05/19 1353     Ova + Parasite Exam No ova or parasites seen on concentrated smear     Trichrome Stain No ova or parasites seen on trichrome stain    Stool Culture (Reference Lab) - Stool, Per Rectum [[id number]] Collected:  10/30/19 0507    Lab Status:  Final result Specimen:  Stool from Per Rectum Updated:  11/04/19 1708     Salmonella/Shigella Screen Final report     Result 1 Comment     Comment: No Salmonella or Shigella recovered.        Campylobacter Culture Final report     Result 1 Comment     Comment: No Campylobacter species isolated.        E coli, Shiga toxin Assay Negative    Narrative:       Performed at:  [address] [place] [address]  [id number]  : Dl Laird PhD, Phone:  [phone]    Gastrointestinal Panel, PCR ([facility]) - Stool, Per Rectum [[id number]] Collected:  10/30/19 0507    Lab Status:  Final result Specimen:  Stool from Per Rectum Updated:  11/01/19 1357     Reference Lab Report See Attached Report     Comment: See scanned report       Blood Culture - Blood, Wrist, Right [[id number]] Collected:  10/26/19 1805    Lab Status:  Final result Specimen:  Blood from Wrist, Right Updated:  10/31/19 2000     Blood Culture No growth at 5 days    Blood Culture - Blood, Hand, Right [[id number]] Collected:  10/26/19 1822    Lab Status:  Final result Specimen:  Blood from Hand, Right Updated:  10/31/19 2000     Blood Culture No growth at 5 days    Body Fluid Culture - Body Fluid, Kidney, Right [[id number]]  (Abnormal)  (Susceptibility) Collected:  10/29/19 1057    Lab Status:  Final result Specimen:  Body Fluid from Kidney, Right Updated:  10/31/19 0624     Body Fluid Culture Scant growth (1+) Klebsiella pneumoniae ssp pneumoniae      Gram Stain Moderate (3+) WBCs seen      No organisms seen    Susceptibility      Klebsiella pneumoniae ssp pneumoniae     EYAD     Ampicillin Resistant     Ampicillin + Sulbactam Susceptible     Cefazolin Susceptible     Cefepime Susceptible     Ceftazidime Susceptible     Ceftriaxone Susceptible     Gentamicin Susceptible     Levofloxacin Susceptible     Piperacillin + Tazobactam Susceptible     Trimethoprim + Sulfamethoxazole Susceptible                    Clostridium Difficile Toxin - Stool, Per Rectum [[id number]] Collected:  10/30/19 0507    Lab Status:  Final result Specimen:  Stool from Per Rectum Updated:  10/30/19 0826    Narrative:       The following orders were created for panel order Clostridium Difficile Toxin - Stool, Per Rectum.  Procedure                               Abnormality         Status                     ---------                               -----------         ------                     Clostridium Difficile To...[[id number]]  Normal              Final result                 Please view results for these tests on the individual orders.    Clostridium Difficile Toxin, PCR - Stool, Per Rectum [[id number]]  (Normal) Collected:  10/30/19 0507    Lab Status:  Final result Specimen:  Stool from Per Rectum Updated:  10/30/19 0826     C. Difficile Toxins by PCR Not Detected    Narrative:       Performance characteristics of test not established for patients <2 years of age.  Negative for Toxigenic C. Difficile    Urine Culture - Urine, Urine, Clean Catch [[id number]]  (Abnormal) Collected:  10/28/19 1341    Lab Status:  Final result Specimen:  Urine, Clean Catch Updated:  10/29/19 1033     Urine Culture Yeast isolated        Imaging Results (Last 24 Hours)     ** No results found for the last 24 hours. **        Results for orders placed during the hospital encounter of 05/18/19   Adult Transthoracic Echo Complete W/ Cont if Necessary Per Protocol    Narrative · Estimated EF = 60%.  · Mild  mitral valve regurgitation is present  · Mild tricuspid valve regurgitation is present.  · Calculated right ventricular systolic pressure from tricuspid   regurgitation is 29 mmHg.        I have reviewed the medications:  Scheduled Meds:    amLODIPine 10 mg Oral Q24H   aspirin 81 mg Oral Daily   DULoxetine 60 mg Oral Daily   heparin (porcine) 5,000 Units Subcutaneous Q8H   hydrALAZINE 25 mg Oral Q8H   insulin detemir 22 Units Subcutaneous Q12H   insulin lispro 0-7 Units Subcutaneous 4x Daily With Meals & Nightly   insulin lispro 5 Units Subcutaneous TID With Meals   metoprolol tartrate 100 mg Oral Q12H   nystatin  Topical BID   piperacillin-tazobactam 3.375 g Intravenous Q8H   saccharomyces boulardii 250 mg Oral BID   sodium chloride 10 mL Intravenous Q12H   terazosin 5 mg Oral Nightly     Continuous Infusions:   PRN Meds:  •  acetaminophen **OR** acetaminophen **OR** acetaminophen  •  dextrose  •  dextrose  •  glucagon (human recombinant)  •  loperamide  •  magnesium sulfate **OR** magnesium sulfate **OR** magnesium sulfate  •  ondansetron  •  oxyCODONE-acetaminophen  •  potassium chloride **OR** potassium chloride **OR** potassium chloride  •  Insert peripheral IV **AND** sodium chloride  •  sodium chloride    Assessment / Plan     Active Hospital Problems    Diagnosis  POA   • Pneumaturia [R39.89]  Clinically Undetermined   • Perinephric abscess [N15.1]  Yes   • Peripheral vascular disease (CMS/HCC) [I73.9]  Yes   • S/P BKA (below knee amputation), left (CMS/HCC) [Z89.512]  Not Applicable   • Diabetic ulcer of right lower leg (CMS/HCC) [E11.622, L97.919]  Yes   • Type 2 diabetes mellitus with circulatory disorder and uncontrolled [E11.59]  Yes   • Hyperlipemia [E78.5]  Yes   • Essential hypertension [I10]  Yes      Resolved Hospital Problems   No resolved problems to display.     Brief Hospital Course to date:  Kendrick Crystal is a 49 y.o. male who presented to the ER with weakness.  Discharged 10/23 - admitted  again on 10/26    High Risk for Re-admission  - discharged on 10/23 - readmit 10/26  - high LACE score  - history of compliance issues    Perinephric Abscess  - Urology following  - CRS examined patient and there was no evidence of rectal mass or abscess, no immediate need for colonoscopy   --per ID notes, last US revealed no abscess present on 11/7/19 that was discussed/ verified by radiology and ID  --Discussed with Dr Dong today - he plans to discontinue IV Zosyn tomorrow      Uncontrolled Diabetes  - long history of poorly controlled DM  - mostly A1Cs >13  - high sugars for over 2 yrs, likely longer    Obesity  - suspected undiagnosed sleep apnea    Depression  - continue Cymbalta 60 mg daily    Debility  - s/p Below Knee Amputation  - immobility  - Refusing to work with PT/OT and refusing to go to any rehab facility other than [facility]. [facility] has declined to offer him a bed.  has arrange ambulance to home for 11/14 - he is extremely high risk for readmission     Hypertension  - on Norvasc, hydralazine, metoprolol, terazosin    DVT Prophylaxis:  Heparin 5,000 Units SC every 8 hours  Disposition: I expect the patient to be discharged tomorrow     CODE STATUS:   Code Status and Medical Interventions:   Ordered at: 10/26/19 2039     Level Of Support Discussed With:    Patient     Code Status:    CPR     Medical Interventions (Level of Support Prior to Arrest):    Full     Electronically signed by Santa Cope PA-C, 11/13/19, 3:09 PM.      Electronically signed by Santa Cope PA-C at 11/13/19 1522       Physical Therapy Notes (last 24 hours) (Notes from 11/13/19 0847 through 11/14/19 0847)     No notes of this type exist for this encounter.        Occupational Therapy Notes (last 24 hours) (Notes from 11/13/19 0847 through 11/14/19 0847)     No notes of this type exist for this encounter.        Discharge Summary     No notes of this type exist for this encounter.

## 2019-11-14 NOTE — PROGRESS NOTES
Continued Stay Note  [facility]     Patient Name: Kendrick Crystal  MRN: [id number]  Today's Date: 11/14/2019    Admit Date: 10/26/2019    Discharge Plan     Row Name 11/14/19 0914       Plan    Plan  Home with [facility]    Patient/Family in Agreement with Plan  yes    Plan Comments  Spoke to patient and wife. Patient is going to need a leatha lift and bariatric BSC and a new PCP at discharge. Called Janice at [facility] and left message with patient's information. Called Jemal with [facility] called and ordered faxed for DME. CM will continue to follow.    Final Discharge Disposition Code  06 - home with home health care        Discharge Codes    No documentation.       Expected Discharge Date and Time     Expected Discharge Date Expected Discharge Time    Nov 15, 2019             Fadi Valentine RN

## 2019-11-14 NOTE — PROGRESS NOTES
[facility] Progress Note        Antibiotics:  Anti-Infectives (From admission, onward)    Ordered     Dose/Rate Route Frequency Start Stop    19 0848  fluconazole (DIFLUCAN) IVPB 200 mg     Shauna Sanford RN reviewed the order on 19 1844.   Ordering Provider:  Usman Dong MD    200 mg  100 mL/hr over 60 Minutes Intravenous Daily 19 0945 11/10/19 1133    10/30/19 0725  fluconazole (DIFLUCAN) IVPB 200 mg     Ordering Provider:  Usman Dong MD    200 mg  over 60 Minutes Intravenous Daily 10/30/19 0900 19 1028    10/28/19 0743  piperacillin-tazobactam (ZOSYN) 3.375 g in iso-osmotic dextrose 50 ml (premix)     Usman Dong MD let the order  on 19 1034.   Ordering Provider:  Usman Dong MD    3.375 g  over 4 Hours Intravenous Every 8 Hours 10/28/19 1700 19 1659    10/28/19 0743  piperacillin-tazobactam (ZOSYN) 3.375 g in iso-osmotic dextrose 50 ml (premix)     Ordering Provider:  Usman Dong MD    3.375 g  over 30 Minutes Intravenous Once 10/28/19 1100 10/28/19 1303    10/26/19 2040  meropenem (MERREM) 1 g/100 mL 0.9% NS VTB (mbp)     Ordering Provider:  Susan Infante APRN    1 g  over 30 Minutes Intravenous Once 10/26/19 2130 10/26/19 2241          CC: fatigue    HPI:    Patient is a 49 y.o.  Yr old male with history of poorly contolled T2DM, DUNLAP/liver cirrhosis, HTN, PVD/Left BKA, and multiple skin abscesses/MRSA who presented to [facility] ED with right shin wound infection summer 2018.  He was unable to get to see Dr. Martinez as his father passed away unexpectedly on 18 and he had to wait until after the .  The wound worsened becoming gangrenous and he came to [facility] ED in 2018.  He also had been hospitalized at [facility] and then transferred to  for a severe penis/scrotum infection requiring surgical debridement in summer 2018.  He received antibiotics regarding his right leg infection, generally improved  over the remainder of summer 2018 but that area had not completely healed. He was admitted April 24, 2019 with acute left lower abdominal wall redness/swelling and pain, spontaneous drainage associated with fever/chills and uncontrolled blood sugars.  4/26 I&D requiring wide debridement , severe/deep infection and transferred to [facility] on May 3 with IV Zosyn/oral doxycycline. Cultures had lactobacillus and mixed gram-positive skin sherly including alpha strep, staph epidermidis and corynebacterium. Readmitted to [facility] May 18, 2019, increasing generalized edema ultimately transitioned to oral doxycycline and healed.     He presented to the emergency room July 30, 2019 with acute rectal pain that had begun 7/27; this is associated with constipation for days, chills and nausea/dry heaving.  White blood cell count elevated, high hemoglobin A1c over 13, urinalysis with hematuria/pyuria and CT scan with 3 x 3 cm fluid collection anterior to the distal rectum and per discussion with Dr. Akbar/Jennifer, this is not in the prostate.  Colorectal surgery/urology saw him for consideration of surgical options/timing/threshold, etc..     8/2/19 I&Dper Dr Payne perirectal abscess; patient reports that he had instrumented his rectum prior to hospitalization with enema     8/3/19 urinary retention overnight requiring in/out catheterization per patient.  Creat climb     8/4/19 further creat climb; childs placed and lisinopril stopped and d/w Dr Rubio;  ?obstruction initially associated with retention postop (1200 out with I/O cath postop per nursing) -v- contrast from CT -v- lisinopril/medication/vancomycin -v- relative hypotension -v- likely combination of factors with ATN; nephrology following; vancomycin trough high and vancomycin stopped empirically 8/3 although high trough potentially accumulation as a consequence of diminishing renal function associated with retention/contrast/pressure rather than cause.  " Nonetheless, avoid for now; creatinine trending down.        8/7 in retrospect he remembers air in his urine at admit which has raised concern for possible fistula from urinary tract;  No fecaluria and culture from abscess is fecal sherly;  ?rectal-urethral fistula;  Dr Payne aware     Culture with E. coli/Klebsiella species and creatinine generally trended down, transitioned to oral antibiotics at discharge.     Readmitted August 17, 2019 with nausea/vomiting/dry heaves for 3 days.  Walker catheter was placed and CT scan of the abdomen showed:      \"Previously seen fluid collection identified inferior to the prostate gland is more increased in density on today's examination. There is wall thickening again seen throughout the bladder. Findings again are concerning for cystitis.\" per radiology     He was transitioned to oral omnicef/topical antifungal on approx 8/23/19; Noncompliant with f/u in our office     READMITTED 10/8/19 RLQ abd pain, urinary retention, nausea, dysuria and generalized fatigue     UTI by U/A, subsequent blood cultures positive for  kleb sp, urine with kleb and e coli ;  Abnormal CT abd with right perinephric fluid collection, advanced cirrhosis, urinary bladder thickening.  10/9 Aspirate of perinephric fluid collection consistent with abscess/kleb and white blood cells; 10/16/19 cytoscopy with bullous change per Dr Gutierres but no fistula per him; 10/19/19 intermittent nausea, no vomiting or dry heaves this am, intermittent dry cough broadened to zosyn with ?aspiration pneumonia evolving after dry heaves/vomiting and had intermittent diarrhea, oral vancomycin added empirically with history of prior C. Difficile; improved with IV Zosyn/oral vancomycin and he refused consideration of placement.  He insisted on home, discharged October 23 and noncompliant with outpatient therapy and outpatient follow-up October 25.  He had become fatigued such that his family could not assist him out of bed and it was " recommended by my office that he return to the emergency room October 25.  He apparently refused that but did come to the emergency room October 26.     Readmitted October 26, 2019 with generalized weakness, fatigue/malaise and nausea/vomiting/diarrhea.  CT scan revealed increased size of perinephric fluid collection per radiology.     On October 28, patient had reported increased diarrhea, at least 6 episodes overnight and watery/thin but no hematochezia melena or hematemesis.  Vomiting has subsided     10/29 drain placed    11/5/19 drain inadvertently out overnight per him    11/8 voiding cystourethrogram per urology; ultrasound abdomen with no mention of abscess per radiology    11/14/19 doing ok per him;  GI habits variable, no vomiting today; stool consistency variable and no diarrhea at present per family; no abs pain.  No dysuria hematuria or pyuria.  Denies new hesitancy urgency or flank pain.      No headache photophobia or neck stiffness.  No shortness of breath cough or hemoptysis.  No fevers chills or sweats.  No rash.  No other particular exposures     ROS:      11/14/19 No f/c/s. No vomiting. No rash. No new ADR to Abx.     Constitutional-- No Fever, chills or sweats.  Appetite diminished with generalized malaise and fatigue  Heent-- No new vision, hearing or throat complaints.  No epistaxis or oral sores.  Denies odynophagia or dysphagia.  No flashers, floaters or eye pain. No odynophagia or dysphagia. No headache, photophobia or neck stiffness.  CV-- No chest pain, palpitation or syncope  Resp-- No SOB/cough/Hemoptysis  GI-as above.  No hematochezia, melena, or hematemesis. Denies jaundice or chronic liver disease.  -- No dysuria, hematuria, or flank pain.  Denies hesitancy or flank pain.  Lymph- no swollen lymph nodes in neck/axilla or groin.   Heme- No active bruising or bleeding; no Hx of DVT or PE.  MS-- no swelling or pain in the bones or joints of arms/legs.  No new back pain.  Neuro-- No  "acute focal weakness or numbness in the arms or legs.  No seizures.     Full 12 point review of systems reviewed and negative otherwise for acute complaints, except for above      PE:     /95 (BP Location: Left arm, Patient Position: Lying)   Pulse 98   Temp 98.3 °F (36.8 °C) (Oral)   Resp 18   Ht 190.5 cm (75\")   Wt 123 kg (271 lb 8 oz)   SpO2 97%   BMI 33.94 kg/m²     GENERAL: Awake and alert, in no acute distress.   HEENT: Normocephalic, atraumatic.  PERRL. EOMI. No conjunctival injection. No icterus. Oropharynx clear without evidence of thrush or exudate. No evidence of peridontal disease.    NECK: Supple without nuchal rigidity. No mass.  LYMPH: No cervical, axillary or inguinal lymphadenopathy.  HEART: RRR; No murmur, rubs, gallops.   LUNGS: Diminished at bases to auscultation bilaterally with slight rhonchi bilateral but no wheezing or rales. Normal respiratory effort. Nonlabored. No dullness.  ABDOMEN: Soft, nontender, nondistended. Positive bowel sounds. No rebound or guarding. NO mass or HSM.  EXT:  No cyanosis, clubbing or edema. No cord.  :  erythema/sattelite lesions lesions  MSK: FROM without joint effusions noted arms/legs.    SKIN: Warm and dry without cutaneous eruptions on Inspection/palpation.    NEURO: Oriented to PPT. No focal deficits on motor/sensory exam at arms/legs.     No peripheral stigmata/phenomena of endocarditis     PICC line without obvious redness or drainage     Laboratory Data    Results from last 7 days   Lab Units 11/10/19  0904   WBC 10*3/mm3 10.59   HEMOGLOBIN g/dL 9.7*   HEMATOCRIT % 32.2*   PLATELETS 10*3/mm3 339     Results from last 7 days   Lab Units 11/10/19  0904   SODIUM mmol/L 140   POTASSIUM mmol/L 4.0   CHLORIDE mmol/L 104   CO2 mmol/L 25.0   BUN mg/dL 14   CREATININE mg/dL 0.59*   GLUCOSE mg/dL 202*   CALCIUM mg/dL 8.5*                   Estimated Creatinine Clearance: 209.3 mL/min (A) (by C-G formula based on SCr of 0.59 mg/dL " (L)).      Microbiology:      Radiology:  Imaging Results (last 72 hours)     Procedure Component Value Units Date/Time    CT Abdomen Pelvis With Contrast [[id number]] Collected:  10/26/19 2035     Updated:  10/26/19 2037    Narrative:       CT ABDOMEN AND PELVIS WITH CONTRAST, 10/26/2019    HISTORY:  49-year-old male with recent history of a right perinephric fluid collection that underwent diagnostic needle aspiration 10/9/2019. He has been on IV antibiotic therapy for possible perinephric abscess. Examination is requested today for follow-up.    TECHNIQUE:  CT imaging of the abdomen and pelvis with IV contrast. Radiation dose reduction techniques included automated exposure control. Radiation audit for CT and nuclear cardiology exams in the last 12 months: 0.    ABDOMEN FINDINGS:  Rim-enhancing pericapsular or subcapsular fluid collection along the posterior margin of the lower pole right kidney has enlarged since 10/18/2019. It previously measured about 5.7 x 3.9 cm axially and currently measures 6.0 x 4.8 cm in the same location  (see axial image 48). There is associated perinephric soft tissue stranding, most likely inflammatory. The findings are compatible with clinically suspected perinephric abscess.    Both kidneys enhance normally. There is no evidence of upper urinary tract obstruction.    Liver, pancreas and spleen are within normal limits. No bile duct or pancreatic duct dilatation.    Small bowel and colon are normal in caliber and appearance without GI contrast. Normal appendix. No gastric distention, hiatal hernia or distal esophageal dilatation.    PELVIS FINDINGS:  Urinary bladder, prostate and rectum are within normal limits.    Lung base images show a tiny right pleural effusion.      Impression:       1.  Likely perinephric or subcapsular abscess along the posterior aspect of the lower pole right kidney. This has enlarged since 10/18/2019.  2.  Normal renal parenchymal enhancement. No CT  evidence of pyelonephritis at this time. No evidence of upper urinary tract obstruction.  3.  Tiny right pleural effusion.    Signer Name: Fadi Healy MD   Signed: 10/26/2019 8:35 PM   Workstation Name: [email or web address]    [facility]            Impression:      --Acute Kleb septicemia/sepsis on treatment since last admit, likely urinary source/pyelnoephritis associated with urinary retention and  with abnormal CT scan with perinephric collection/abscess and Kleb on aspirate that has persisted as of aspirate 10/29 arguing relatively sequestered focus not penetrated by systemic abx and now with drain placed 10/29;  IV  Zosyn ongoing; urology to determine any timing/option or threshold for further intervention such as surgical debridement if not responsive to drain/abx, or other functional/anatomic assessment.  DRAIN INADVERTENTLY OUT 11/4;  Monitor clinically,   D/w Dr Forbes; ultrasound done on November 8 with no mention of abscess by radiology.  Sought  radiology clarification as to whether this modality is good enough in comparison to prior CT scans to know whether abscess is better/gone versus need for different modality such as repeat CT scan to better clarify. I d/w Dr Escboedo 11/11 and he reviewed the US from 11/8 and he reports to me US is adequate for followup on this and NO current evidence for abscess, no need for CT scan at present to evaluate abscess per d/w him and given clinical improvements/radiographic improvements     --Acute perinephric abscess as above;   Urology following to help guide timing/option/threshold for further drainage (perc -v- other);  Perc drain 10/29 and kleb persisted in culture; see above regarding repeat/recent scans    --urinary frequency with cutaneous candidiasis and candiduria/pyruria, improved;  S/p diflucan     --abnormal imaging with dry cough and probable pneumonia last admission;  At risk for aspiration with recent vomiting/dry heaves and  empiric zosyn started 10/19; changed to Invanz 10/23 to help facilitate home therapy with mom and changed back to Zosyn October 28;   no productive cough at present, but if it develops, then send for culture     --acute  diarrhea 10/27-10/28 with Hx CDiff per him/family and recent empiric oral vancomycin, CDT negative and oral vancomycin stopped;  If recurrent diarrhea then consider more wrolup or empiric therapy     --Hx perirectal abscess ; Colorectal surgery, Dr. Payne previously saw and is following.  Drainage as above on August 2 with mxed Fecal sherly in culture; CT scans  with no mention of abscess on 8/18 study;   any timing/option or threshold for further GI/colorectal work-up per Dr payne/CRS     --History urinary retention.  urology following     --Hx ARF in August 2019;  ?obstruction initially associated with retention postop (1200 out with I/O cath postop per nursing) -v- contrast from CT -v- lisinopril/medication/vancomycin -v- relative hypotension -v- likely combination of factors with ATN;  vancomycin trough high and vancomycin stopped empirically 8/3 although high trough potentially accumulation as a consequence of diminishing renal function associated with retention/contrast/pressure rather than cause.  Nonetheless, avoiding for now; likely further acute kidney injury at admission August 17 associated with dehydration/prerenal/ATN etiology     --History MRSA;  Not in current culture     --Diabetes mellitus type 2, uncontrolled.  He reports the reason for this is forgetfulness in past     --History [place] and chronic liver disease/cirrhosis  per past notes     --Left BKA     --Peripheral vascular disease     --medical noncompliance and inability to care for self at home.       PLAN:      --IV Zosyn to finish today; s/p  IV diflucan      --Check/review labs cultures and scans     --Discussed with microbiology     --History per nursing staff      --Discussed with family, partial history per  them     --Highly complex set of issues with high risk for further serious morbidity and other serious sequela     --Colorectal surgery and urology have followed; urology following to determine timing/option or threshold for further percutaneous aspiration/drainage versus other surgery regarding  Abscess and to guide further workup for air in urine     --D/w Dr Escobedo as above    **he is at end of 2 weeks IV antibiotics from drain placement on 10/29 and clinically better, repeat imaging/US no abscess per my discussion with raiology as above        Usman Dong MD  11/14/2019

## 2019-11-14 NOTE — DISCHARGE PLACEMENT REQUEST
"Elliot Crystal (49 y.o. Male)   Neil Valentine, RN Case Manager  [phone]      Date of Birth Social Security Number Address Home Phone MRN    1970  [facility] [address] [phone] [id number]    Anglican Marital Status          Mosque        Admission Date Admission Type Admitting Provider Attending Provider Department, Room/Bed    10/26/19 Emergency Cody Naqvi MD Schnell, Aaron, MD [facility] 3F, S320/1    Discharge Date Discharge Disposition Discharge Destination                       Attending Provider:  Cody Naqvi MD    Allergies:  No Known Allergies    Isolation:  None   Infection:  MRSA (03/03/17)   Code Status:  CPR    Ht:  190.5 cm (75\")   Wt:  123 kg (271 lb 8 oz)    Admission Cmt:  None   Principal Problem:  None                Active Insurance as of 10/26/2019     Primary Coverage     Payor Plan Insurance Group Employer/Plan Group    MEDICARE MEDICARE A & B      Payor Plan Address Payor Plan Phone Number Payor Plan Fax Number Effective Dates    [address] [phone]  6/1/2017 - None Entered    [facility] [address]       Subscriber Name Subscriber Birth Date Member ID       ELLIOT CRYSTAL 1970 [id number]           Secondary Coverage     Payor Plan Insurance Group Employer/Plan Group    [place]      Payor Plan Address Payor Plan Phone Number Payor Plan Fax Number Effective Dates    [address] [phone]  10/3/2017 - None Entered    [place] [address]       Subscriber Name Subscriber Birth Date Member ID       ELLIOT CRYSTAL 1970 [id number]                 Emergency Contacts      (Rel.) Home Phone Work Phone Mobile Phone    Cindy Crystal (Spouse) [phone] -- [phone]        [address]  [address] [facility] [address]  Dept. Phone:  [phone]  Dept. Fax:   Date Ordered: Nov 14, 2019         Patient:  Elliot Crystal MRN:  [id number]   [address] " "[place] [address] :  1970  SSN:    Phone: [phone] Sex:  M     Weight: 123 kg (271 lb 8 oz)         Ht Readings from Last 1 Encounters:   10/26/19 190.5 cm (75\")         Commode Chair          (Order ID: [id number])    Diagnosis:  S/P BKA (below knee amputation), left (CMS/HCC) (Z89.512 [ICD-10-CM] V49.75 [ICD-9-CM])  Impaired mobility and ADLs (Z74.09 [ICD-10-CM] 799.89 [ICD-9-CM])  Type 2 diabetes mellitus with diabetic peripheral angiopathy and gangrene, with long-term current use of insulin (CMS/HCC) (E11.52,Z79.4 [ICD-10-CM] 250.70,443.81,785.4,V58.67 [ICD-9-CM])  Diabetic ulcer of right lower leg (CMS/HCC) (E11.622,L97.919 [ICD-10-CM] 250.80,707.10 [ICD-9-CM])  Peripheral vascular disease (CMS/HCC) (I73.9 [ICD-10-CM] 443.9 [ICD-9-CM])   Quantity:  1     Equipment:  Commode Chair w/ Detachable Arms  Length of Need (99 Months = Lifetime): 99 Months = Lifetime        Authorizing Provider's Phone: [phone]   Verbal Order Mode: Verbal with readback   Authorizing Provider: Santa Cope PA-C  Authorizing Provider's NPI: [id number]     Order Entered By: Fadi Valentine RN 2019  9:48 AM     Electronically signed by:                "

## 2019-11-14 NOTE — DISCHARGE PLACEMENT REQUEST
"Elliot Crystal (49 y.o. Male)   Neil Valentine, RN Case Manager  [phone]    Date of Birth Social Security Number Address Home Phone MRN    1970  [facility] [address] [phone] [id number]    Gnosticism Marital Status          Religious        Admission Date Admission Type Admitting Provider Attending Provider Department, Room/Bed    10/26/19 Emergency Cody Naqvi MD Schnell, Aaron, MD [facility] 3F, S320/1    Discharge Date Discharge Disposition Discharge Destination                       Attending Provider:  Cody Naqvi MD    Allergies:  No Known Allergies    Isolation:  None   Infection:  MRSA (03/03/17)   Code Status:  CPR    Ht:  190.5 cm (75\")   Wt:  123 kg (271 lb 8 oz)    Admission Cmt:  None   Principal Problem:  None                Active Insurance as of 10/26/2019     Primary Coverage     Payor Plan Insurance Group Employer/Plan Group    MEDICARE MEDICARE A & B      Payor Plan Address Payor Plan Phone Number Payor Plan Fax Number Effective Dates    [address] [phone]  6/1/2017 - None Entered    [facility] [id number]       Subscriber Name Subscriber Birth Date Member ID       ELLIOT CRYSTAL 1970 [id number]           Secondary Coverage     Payor Plan Insurance Group Employer/Plan Group    [place]      Payor Plan Address Payor Plan Phone Number Payor Plan Fax Number Effective Dates    [address] [phone]  10/3/2017 - None Entered    [place] [address]       Subscriber Name Subscriber Birth Date Member ID       ELLIOT CRYSTAL 1970 [id number]                 Emergency Contacts      (Rel.) Home Phone Work Phone Mobile Phone    Cindy Crystal (Spouse) [phone] -- [phone]        [address]  [address] [facility] [address]  Dept. Phone:  [phone]  Dept. Fax:   Date Ordered: Nov 14, 2019         Patient:  Elliot Crystal MRN:  [id number]   [address] [place] [address] :  1970  SSN:    Phone: [phone] Sex:  M     Weight: 123 kg (271 lb 8 oz)         Ht Readings from Last 1 Encounters:   10/26/19 190.5 cm (75\")         Patient Lift       (Order ID: [id number])    Diagnosis:  S/P BKA (below knee amputation), left (CMS/HCC) (Z89.512 [ICD-10-CM] V49.75 [ICD-9-CM])  Impaired mobility and ADLs (Z74.09 [ICD-10-CM] 799.89 [ICD-9-CM])  Type 2 diabetes mellitus with diabetic peripheral angiopathy and gangrene, with long-term current use of insulin (CMS/HCC) (E11.52,Z79.4 [ICD-10-CM] 250.70,443.81,785.4,V58.67 [ICD-9-CM])  Peripheral vascular disease (CMS/HCC) (I73.9 [ICD-10-CM] 443.9 [ICD-9-CM])   Quantity:  1     Lift type: W/ Seat or Sling Hydraulic  Does the patient have severe arthritis of the hip or knee? No  Does the patient have a severe neurouscular disease? Yes  Is the patient completely incapable of standing up from a regular armchair or any chair in his/her home: Yes  Once standing, does the patient have the ability to ambulate: No  Have all appropriate therapeutic modalities to enable the patient to transfer from a chair to a standing position (medication, physical therapy) been tried or failed? If YES, this is documented in the patient's medical records. Yes  The face to face evaluation was performed on: 2019  Length of Need (99 Months = Lifetime): 99 Months = Lifetime        Authorizing Provider's Phone: [phone]   Verbal Order Mode: Verbal with readback   Authorizing Provider: Santa Cope PA-C  Authorizing Provider's NPI: [id number]     Order Entered By: Fadi Valentine RN 2019  9:10 AM     Electronically signed by:          [address] [address]  Dept. Phone:  [phone]  Dept. Fax:   Date Ordered: 2019         Patient:  Kendrick Crystal MRN:  [id number]   [address] [place] [address] :  1970  SSN:  " "  Phone: [phone] Sex:  M     Weight: 123 kg (271 lb 8 oz)         Ht Readings from Last 1 Encounters:   10/26/19 190.5 cm (75\")         Commode Chair          (Order ID: [id number])    Diagnosis:  S/P BKA (below knee amputation), left (CMS/HCC) (Z89.512 [ICD-10-CM] V49.75 [ICD-9-CM])  Impaired mobility and ADLs (Z74.09 [ICD-10-CM] 799.89 [ICD-9-CM])  Type 2 diabetes mellitus with diabetic peripheral angiopathy and gangrene, with long-term current use of insulin (CMS/HCC) (E11.52,Z79.4 [ICD-10-CM] 250.70,443.81,785.4,V58.67 [ICD-9-CM])   Quantity:  1     Equipment:  Bariatric Bedside Commode, Heavy Duty +/or Width >/= 23\"; >/= 300 lbs  Length of Need (99 Months = Lifetime): 99 Months = Lifetime        Authorizing Provider's Phone: [phone]   Verbal Order Mode: Verbal with readback   Authorizing Provider: Santa Cope PA-C  Authorizing Provider's NPI: [id number]     Order Entered By: Fadi Valentine RN 2019  9:10 AM     Electronically signed by:                 History & Physical      Sima Zarate MD at 10/26/19 2043              [facility] Medicine Services  HISTORY AND PHYSICAL    Patient Name: Kendrick Crystal  : 1970  MRN: [id number]  Primary Care Physician: Provider, No Known  Date of admission: 10/26/2019      Subjective   Subjective     Chief Complaint:  Kendrick Crystal is a 49-year-old male, recently discharged on Wednesday due to perinephric abscess, sent into the ER today with increased weakness, diarrhea, malaise and fatigue that started yesterday.    HPI:  Kendrick Crystal is a 49 y.o. male with history of diabetes, pretension, hyperlipidemia, C. difficile, PAD with a BKA of the left lower extremity presenting with increased weakness, diarrhea, malaise and fatigue that started yesterday.  Patient was discharged on Wednesday due to perinephric abscess on IV Invanz and oral vancomycin for C-diff.  Patient reports diarrhea after eating chili last " night.  Patient states his stomach was upset and he had indigestion and dry heaves.  Patient denies diarrhea today. CT scan of the abdomen today reveals perinephric or subscapular abscess along the posterior aspect of the lower pole the right kidney that is enlarged since 10/18/2019.  ID was consulted from emergency department.  Recommendations include stopping the Invanz and starting meropenem.  Presently, patient reports chills but denies chest pain, heart palpitations, shortness of breath, nausea, vomiting, diarrhea, constipation.  Patient has a PICC line.  Today there is a diabetic ulcer on his right ankle.    Clinical Findings:   CT of abdomen demonstrates perinephric or subscapular abscess of right kidney which is larger than imaging on 10/18/2019. Patient presenting with S/S of sepsis as indicated by elevated WBC, lactic acidosis, tachycardia, known source of infection.    Review of Systems   Constitutional: Positive for activity change, chills and fatigue. Negative for diaphoresis and fever.        Increased weakness with malaise and fatigue   HENT: Negative for rhinorrhea, sinus pressure and sore throat.    Eyes: Negative.    Respiratory: Negative for cough, chest tightness and shortness of breath.    Cardiovascular: Negative for chest pain, palpitations and leg swelling.   Gastrointestinal: Positive for diarrhea, nausea and vomiting. Negative for constipation.        Right sided abd pain   Endocrine: Negative.    Genitourinary: Positive for flank pain.        Right Sided flank pain   Musculoskeletal:        Left BKA   Skin: Positive for wound.        Diabetic ulcer to the right lateral ankle   Allergic/Immunologic: Negative.    Neurological: Negative.    Hematological: Negative.    Psychiatric/Behavioral: Negative.           All other systems reviewed and are negative.     Personal History     Past Medical History:   Diagnosis Date   • Abdominal wall cellulitis 5/20/2019   • JUAN (acute kidney injury)  (CMS/HCC) 7/29/2018   • Arthritis    • Bipolar 1 disorder (CMS/HCC)    • Cellulitis of right anterior lower leg 07/29/2018    WITH MRSA   • Cirrhosis (CMS/HCC)    • Counseling for insulin pump    • Depression    • Diabetes mellitus (CMS/HCC)    • Encephalopathy, hepatic (CMS/HCC)    • H/O degenerative disc disease    • History of Lissa's gangrene     right leg/testicle   • Hyperlipemia    • Hypertension    • multiple Skin abscesses    • DUNLAP, bx showed stage IV fibrosis    • Neuropathy    • Peripheral vascular disease (CMS/HCC)    • Thrombophlebitis        Past Surgical History:   Procedure Laterality Date   • ABDOMINAL WALL ABSCESS INCISION AND DRAINAGE N/A 4/26/2019    Procedure: ABDOMINAL WALL DEBRIDEMENT;  Surgeon: Fadi Kern MD;  Location:  [facility];  Service: General   • AMPUTATION DIGIT Left 1/30/2017    Procedure: left fourth and fifth transmetatarsal toe amputation ;  Surgeon: Juancho Martinez MD;  Location:  [facility];  Service:    • BELOW KNEE AMPUTATION Left 3/2/2017    Procedure: AMPUTATION BELOW KNEE, SHMUEL;  Surgeon: Juancho Martinez MD;  Location:  [facility];  Service:    • CYSTOSCOPY N/A 10/16/2019    Procedure: CYSTOSCOPY;  Surgeon: Brad Gutierres MD;  Location:  [facility];  Service: Urology   • ENDOSCOPY     • HEMORRHOIDECTOMY N/A 8/2/2019    Procedure: EXCISION AND DRAIN PERIRECTAL ABSCESS;  Surgeon: Mumtaz Payne MD;  Location:  [facility];  Service: General   • LEG SURGERY     • TESTICLE SURGERY Right     DEBRIDEMENT FROM GANGRENE   • TONSILLECTOMY  1975       Family History: family history includes Arthritis in his father and mother; Diabetes in his brother and father; Heart attack in his father; Hyperlipidemia in his father; Hypertension in his brother, father, and mother; Kidney disease in his mother; Mental illness in his sister; Obesity in his brother; Stroke in his mother. Otherwise pertinent FHx was reviewed and unremarkable.     Social History:  reports that he has never  smoked. He has never used smokeless tobacco. He reports that he does not drink alcohol or use drugs.  Social History     Social History Narrative    Lives in [facility]       Medications:    Available home medication information reviewed.  Medications Prior to Admission   Medication Sig Dispense Refill Last Dose   • amLODIPine (NORVASC) 10 MG tablet Take 1 tablet by mouth Daily. 30 tablet 0 Taking   • aspirin 81 MG tablet Take 1 tablet by mouth Daily. 30 tablet 11 Past Month at Unknown time   • DULoxetine (CYMBALTA) 30 MG capsule Take 1 capsule by mouth Daily. 90 capsule 1 Taking   • ertapenem (INVanz) 1 g/100 mL 0.9% NS VTB (mbp) Infuse 100 mL into a venous catheter Daily for 9 doses. 900 mL 0    • insulin lispro (humaLOG) 100 UNIT/ML injection Inject 5 Units under the skin into the appropriate area as directed 4 (Four) Times a Day With Meals & at Bedtime. Plus sliding scale 60 mL 5 Taking   • LANTUS 100 UNIT/ML injection Inject 20 Units under the skin into the appropriate area as directed 2 (Two) Times a Day. 60 mL 1    • metoprolol tartrate (LOPRESSOR) 100 MG tablet Take 100 mg by mouth Every 12 (Twelve) Hours.   Taking   • nystatin (MYCOSTATIN) 918793 UNIT/GM cream Apply  topically to the appropriate area as directed 2 (Two) Times a Day. 30 g 0 Taking   • saccharomyces boulardii (FLORASTOR) 250 MG capsule Take 1 capsule by mouth 2 (Two) Times a Day. 60 capsule 0    • sevelamer (RENVELA) 0.8 g pack packet Take 1 packet by mouth 3 (Three) Times a Day With Meals. 90 packet 0 Taking   • terazosin (HYTRIN) 5 MG capsule Take 1 capsule by mouth Every Night. 30 capsule 0 Taking   • vancomycin 50 MG/ML reconstituted solution oral solution reconstituted Take 2.5 mL by mouth Every 6 (Six) Hours for 32 doses. 80 mL 0    • witch hazel-glycerin (TUCKS) pad Apply  topically to the appropriate area as directed As Needed for Irritation. OTC  12        No Known Allergies    Objective   Objective     Vital Signs:   Temp:  [98.1  °F (36.7 °C)-98.2 °F (36.8 °C)] 98.1 °F (36.7 °C)  Heart Rate:  [] 86  Resp:  [16-17] 17  BP: (157-166)/() 164/97        Physical Exam   Constitutional: He is oriented to person, place, and time. He appears well-developed and well-nourished.   HENT:   Head: Normocephalic and atraumatic.   Eyes: Pupils are equal, round, and reactive to light. Right eye exhibits no discharge. Left eye exhibits no discharge.   Neck: Neck supple. No JVD present.   Cardiovascular: Normal rate, regular rhythm, normal heart sounds and intact distal pulses. Exam reveals no gallop and no friction rub.   No murmur heard.  Pulmonary/Chest: Effort normal and breath sounds normal. No respiratory distress.   Abdominal: Soft. Bowel sounds are normal. He exhibits no distension.   Genitourinary:   Genitourinary Comments: Right sided flank pain   Musculoskeletal: He exhibits no edema.   Left BKA   Neurological: He is alert and oriented to person, place, and time.   Skin:   Wound to the lateral right ankle   Psychiatric: He has a normal mood and affect.   Vitals reviewed.      Results Reviewed:  I have personally reviewed current lab and radiology data.    Results from last 7 days   Lab Units 10/26/19  1807   WBC 10*3/mm3 12.45*   HEMOGLOBIN g/dL 10.5*   HEMATOCRIT % 33.5*   PLATELETS 10*3/mm3 342     Results from last 7 days   Lab Units 10/26/19  1807   SODIUM mmol/L 139   POTASSIUM mmol/L 3.8   CHLORIDE mmol/L 99   CO2 mmol/L 29.0   BUN mg/dL 14   CREATININE mg/dL 0.61*   GLUCOSE mg/dL 217*   CALCIUM mg/dL 8.2*   ALT (SGPT) U/L 19   AST (SGOT) U/L 18   LACTATE mmol/L 2.2*   PROCALCITONIN ng/mL 0.12     Estimated Creatinine Clearance: 190.3 mL/min (A) (by C-G formula based on SCr of 0.61 mg/dL (L)).  Brief Urine Lab Results  (Last result in the past 365 days)      Color   Clarity   Blood   Leuk Est   Nitrite   Protein   CREAT   Urine HCG        10/08/19 2256 Yellow Cloudy Moderate (2+) Small (1+) Positive Negative             Imaging  Results (last 24 hours)     Procedure Component Value Units Date/Time    CT Abdomen Pelvis With Contrast [[id number]] Collected:  10/26/19 2035     Updated:  10/26/19 2037    Narrative:       CT ABDOMEN AND PELVIS WITH CONTRAST, 10/26/2019    HISTORY:  49-year-old male with recent history of a right perinephric fluid collection that underwent diagnostic needle aspiration 10/9/2019. He has been on IV antibiotic therapy for possible perinephric abscess. Examination is requested today for follow-up.    TECHNIQUE:  CT imaging of the abdomen and pelvis with IV contrast. Radiation dose reduction techniques included automated exposure control. Radiation audit for CT and nuclear cardiology exams in the last 12 months: 0.    ABDOMEN FINDINGS:  Rim-enhancing pericapsular or subcapsular fluid collection along the posterior margin of the lower pole right kidney has enlarged since 10/18/2019. It previously measured about 5.7 x 3.9 cm axially and currently measures 6.0 x 4.8 cm in the same location  (see axial image 48). There is associated perinephric soft tissue stranding, most likely inflammatory. The findings are compatible with clinically suspected perinephric abscess.    Both kidneys enhance normally. There is no evidence of upper urinary tract obstruction.    Liver, pancreas and spleen are within normal limits. No bile duct or pancreatic duct dilatation.    Small bowel and colon are normal in caliber and appearance without GI contrast. Normal appendix. No gastric distention, hiatal hernia or distal esophageal dilatation.    PELVIS FINDINGS:  Urinary bladder, prostate and rectum are within normal limits.    Lung base images show a tiny right pleural effusion.      Impression:       1.  Likely perinephric or subcapsular abscess along the posterior aspect of the lower pole right kidney. This has enlarged since 10/18/2019.  2.  Normal renal parenchymal enhancement. No CT evidence of pyelonephritis at this time. No evidence of  upper urinary tract obstruction.  3.  Tiny right pleural effusion.    Signer Name: Fadi Healy MD   Signed: 10/26/2019 8:35 PM   Workstation Name: [id number]    [facility]        Results for orders placed during the hospital encounter of 05/18/19   Adult Transthoracic Echo Complete W/ Cont if Necessary Per Protocol    Narrative · Estimated EF = 60%.  · Mild mitral valve regurgitation is present  · Mild tricuspid valve regurgitation is present.  · Calculated right ventricular systolic pressure from tricuspid   regurgitation is 29 mmHg.          Assessment/Plan   Assessment / Plan     Active Hospital Problems    Diagnosis POA   • Perinephric abscess [N15.1] Yes       Hospital Course  Kendrick Crystal is a 49 male recently discharged from the hospital with a diagnosis of a perinephric abscess.  Patient was discharged on Invanz.  Today, patient presents with weakness, malaise, fatigue.  CT scan indicated the abscess if larger than previous scan 10/18/2019. ID recommends changing Invanz to meropenem 500 mg IV every 6 hours. Patient is also presenting with S/S of sepsis.     Sepsis R/Operinephric abscess  -sepsis focus exam was completed  -Pt receiving IV hydration with  ml/hr  -Continue to monitor Lactic Acid (2.2) WBC (12.45), HR, Temp  -Blood cultures pending  -ID consult AM    Perinephric Abscess  -Infectious disease consulted from the emergency room.  Recommendations include stopping Invanz and starting meropenem 500 mg IV every 6 hours.  -Lactobacillus  -ID consult in AM    Diabetes mellitus  -Levemir 25 units twice daily  -Placed on sliding scale insulin at the mild correction will adjust as needed.    Hypertension  -Continue amlodipine 10 mg daily  -ASA 81 mg daily  -Metoprolol 100 mg bid  -Hytrin 5 mg HS    Hx of C-diff  -continue vancomycin 50mg/ml 2.5 ml q 6 hours    Depression  -continue cymbalta 30 mg daily    Wound to the right ankle  -consult wound care    DVT  prophylaxis:  Heparin 5000 units SQ q 8 hours      CODE STATUS:    Code Status and Medical Interventions:   Ordered at: 10/26/19 2039     Level Of Support Discussed With:    Patient     Code Status:    CPR     Medical Interventions (Level of Support Prior to Arrest):    Full     OBSERVATION status, however if further evaluation or treatment plans warrant, status may change.  Based upon current information, I predict patient's care encounter to be less than or equal to 2 midnights.      Electronically signed by CHINTAN Armstrong, 10/26/19, 8:43 PM.        Brief Attending Admission Attestation     I have seen and examined the patient, performing an independent face-to-face diagnostic evaluation with plan of care reviewed and developed with the advanced practice clinician (APC).      Brief Summary Statement:   Kendrick Crystal is a 49 y.o. male with pMH significant for poorly controlled DM, HTN, HLD, c diff, PAD s/p BKA LLE and recent diagnosis pf perinephric abscess.  The patient was discharged on 10/23/2019 home on INVANZ and oral vancomycin.  The patient reports progressive weakness since discharge.  He has not had any fevers noted but does report chills.  He reports some diarrhea last night, but none today.      Here in the ER, CT of abdomen shows right kidney perinephric versus subcapsular fluid collection slightly larger on today's imaging than previous.  WBC 12.45K, lactic acid 2.2.  Mild tachycardia.      ID was consulted by phone by the ER and recommended changing INVANZ to Zosyn.  UA is pending.     Remainder of detailed HPI is as noted above and has been reviewed and/or edited by me for completeness.      Attending Physical Exam:  Constitutional: Awake, alert  Eyes: PERRLA, sclerae anicteric, no conjunctival injection  HENT: NCAT, mucous membranes moist  Neck: Supple, no thyromegaly, no lymphadenopathy, trachea midline  Respiratory: Clear to auscultation bilaterally, nonlabored respirations    Cardiovascular: RRR, no murmurs, rubs, or gallops, palpable pedal pulse left  Gastrointestinal: Positive bowel sounds, soft, nontender, nondistended  Musculoskeletal: :eft BKA  Psychiatric: Appropriate affect, cooperative  Neurologic: Oriented x 3, strength symmetric in all extremities, Cranial Nerves grossly intact to confrontation, speech clear  Skin: skin over right lateral malleolus - ulcer        Brief Assessment/Plan :  See above for further detailed assessment and plan developed with APC which I have reviewed and/or edited for completeness.      Electronically signed by Sima Zarate MD, 10/26/19, 11:07 PM.           Electronically signed by Sima Zarate MD at 10/26/19 5436       Vital Signs (last day)     Date/Time   Temp   Temp src   Pulse   Resp   BP   Patient Position   SpO2    11/14/19 0759   97.6 (36.4)   Oral   94   18   157/118  (Abnormal)    Lying   --    11/14/19 0738   --   --   99   --   --   --   --    11/14/19 0300   98.3 (36.8)   Oral   98   18   159/95   Lying   --    11/13/19 2123   --   --   88   --   --   --   --    11/13/19 1900   98.1 (36.7)   Oral   90   18   160/95   Lying   --    11/13/19 1602   97.4 (36.3)   Oral   --   18   137/90   Lying   97    11/13/19 1135   97.3 (36.3)   Oral   78   18   149/92   Lying   95    11/13/19 1130   --   --   80   --   147/102  (Abnormal)    --   --    11/13/19 0919   97.4 (36.3)   Oral   95   18   152/96   Lying   95    11/13/19 0800   --   --   76   --   --   --   --    11/13/19 0557   --   --   75   --   --   --   --    11/13/19 0408   97.5 (36.4)   Oral   74   16   117/80   Lying   --                 Physician Progress Notes (last 24 hours) (Notes from 11/13/19 0913 through 11/14/19 0913)      Usman Dong MD at 11/14/19 0760          [facility] Progress Note        Antibiotics:  Anti-Infectives (From admission, onward)    Ordered     Dose/Rate Route Frequency Start Stop    11/04/19 0848  fluconazole (DIFLUCAN) IVPB 200 mg     Juvenal,  JOSE Villatoro reviewed the order on 19 1844.   Ordering Provider:  Usman Dong MD    200 mg  100 mL/hr over 60 Minutes Intravenous Daily 19 0945 11/10/19 1133    10/30/19 0725  fluconazole (DIFLUCAN) IVPB 200 mg     Ordering Provider:  Usman Dong MD    200 mg  over 60 Minutes Intravenous Daily 10/30/19 0900 19 1028    10/28/19 0743  piperacillin-tazobactam (ZOSYN) 3.375 g in iso-osmotic dextrose 50 ml (premix)     Usman Dong MD let the order  on 19 1034.   Ordering Provider:  Usman Dong MD    3.375 g  over 4 Hours Intravenous Every 8 Hours 10/28/19 1700 19 1659    10/28/19 0743  piperacillin-tazobactam (ZOSYN) 3.375 g in iso-osmotic dextrose 50 ml (premix)     Ordering Provider:  Usman Dong MD    3.375 g  over 30 Minutes Intravenous Once 10/28/19 1100 10/28/19 1303    10/26/19 2040  meropenem (MERREM) 1 g/100 mL 0.9% NS VTB (mbp)     Ordering Provider:  Susan Infante APRN    1 g  over 30 Minutes Intravenous Once 10/26/19 2130 10/26/19 2241          CC: fatigue    HPI:    Patient is a 49 y.o.  Yr old male with history of poorly contolled T2DM, DUNLAP/liver cirrhosis, HTN, PVD/Left BKA, and multiple skin abscesses/MRSA who presented to [facility] ED with right shin wound infection summer 2018.  He was unable to get to see Dr. Martinez as his father passed away unexpectedly on 18 and he had to wait until after the .  The wound worsened becoming gangrenous and he came to [facility] ED in 2018.  He also had been hospitalized at [facility] and then transferred to  for a severe penis/scrotum infection requiring surgical debridement in summer 2018.  He received antibiotics regarding his right leg infection, generally improved over the remainder of summer 2018 but that area had not completely healed. He was admitted 2019 with acute left lower abdominal wall redness/swelling and pain, spontaneous drainage  associated with fever/chills and uncontrolled blood sugars.  4/26 I&D requiring wide debridement , severe/deep infection and transferred to [facility] on May 3 with IV Zosyn/oral doxycycline. Cultures had lactobacillus and mixed gram-positive skin sherly including alpha strep, staph epidermidis and corynebacterium. Readmitted to [facility] May 18, 2019, increasing generalized edema ultimately transitioned to oral doxycycline and healed.     He presented to the emergency room July 30, 2019 with acute rectal pain that had begun 7/27; this is associated with constipation for days, chills and nausea/dry heaving.  White blood cell count elevated, high hemoglobin A1c over 13, urinalysis with hematuria/pyuria and CT scan with 3 x 3 cm fluid collection anterior to the distal rectum and per discussion with Dr. Akbar/Jennifer, this is not in the prostate.  Colorectal surgery/urology saw him for consideration of surgical options/timing/threshold, etc..     8/2/19 I&Dper Dr Payne perirectal abscess; patient reports that he had instrumented his rectum prior to hospitalization with enema     8/3/19 urinary retention overnight requiring in/out catheterization per patient.  Creat climb     8/4/19 further creat climb; childs placed and lisinopril stopped and d/w Dr Rubio;  ?obstruction initially associated with retention postop (1200 out with I/O cath postop per nursing) -v- contrast from CT -v- lisinopril/medication/vancomycin -v- relative hypotension -v- likely combination of factors with ATN; nephrology following; vancomycin trough high and vancomycin stopped empirically 8/3 although high trough potentially accumulation as a consequence of diminishing renal function associated with retention/contrast/pressure rather than cause.  Nonetheless, avoid for now; creatinine trending down.        8/7 in retrospect he remembers air in his urine at admit which has raised concern for possible fistula from urinary tract;  No  "fecaluria and culture from abscess is fecal sherly;  ?rectal-urethral fistula;  Dr Payne aware     Culture with E. coli/Klebsiella species and creatinine generally trended down, transitioned to oral antibiotics at discharge.     Readmitted August 17, 2019 with nausea/vomiting/dry heaves for 3 days.  Walker catheter was placed and CT scan of the abdomen showed:      \"Previously seen fluid collection identified inferior to the prostate gland is more increased in density on today's examination. There is wall thickening again seen throughout the bladder. Findings again are concerning for cystitis.\" per radiology     He was transitioned to oral omnicef/topical antifungal on approx 8/23/19; Noncompliant with f/u in our office     READMITTED 10/8/19 RLQ abd pain, urinary retention, nausea, dysuria and generalized fatigue     UTI by U/A, subsequent blood cultures positive for  kleb sp, urine with kleb and e coli ;  Abnormal CT abd with right perinephric fluid collection, advanced cirrhosis, urinary bladder thickening.  10/9 Aspirate of perinephric fluid collection consistent with abscess/kleb and white blood cells; 10/16/19 cytoscopy with bullous change per Dr Gutierres but no fistula per him; 10/19/19 intermittent nausea, no vomiting or dry heaves this am, intermittent dry cough broadened to zosyn with ?aspiration pneumonia evolving after dry heaves/vomiting and had intermittent diarrhea, oral vancomycin added empirically with history of prior C. Difficile; improved with IV Zosyn/oral vancomycin and he refused consideration of placement.  He insisted on home, discharged October 23 and noncompliant with outpatient therapy and outpatient follow-up October 25.  He had become fatigued such that his family could not assist him out of bed and it was recommended by my office that he return to the emergency room October 25.  He apparently refused that but did come to the emergency room October 26.     Readmitted October 26, 2019 with " generalized weakness, fatigue/malaise and nausea/vomiting/diarrhea.  CT scan revealed increased size of perinephric fluid collection per radiology.     On October 28, patient had reported increased diarrhea, at least 6 episodes overnight and watery/thin but no hematochezia melena or hematemesis.  Vomiting has subsided     10/29 drain placed    11/5/19 drain inadvertently out overnight per him    11/8 voiding cystourethrogram per urology; ultrasound abdomen with no mention of abscess per radiology    11/14/19 doing ok per him;  GI habits variable, no vomiting today; stool consistency variable and no diarrhea at present per family; no abs pain.  No dysuria hematuria or pyuria.  Denies new hesitancy urgency or flank pain.      No headache photophobia or neck stiffness.  No shortness of breath cough or hemoptysis.  No fevers chills or sweats.  No rash.  No other particular exposures     ROS:      11/14/19 No f/c/s. No vomiting. No rash. No new ADR to Abx.     Constitutional-- No Fever, chills or sweats.  Appetite diminished with generalized malaise and fatigue  Heent-- No new vision, hearing or throat complaints.  No epistaxis or oral sores.  Denies odynophagia or dysphagia.  No flashers, floaters or eye pain. No odynophagia or dysphagia. No headache, photophobia or neck stiffness.  CV-- No chest pain, palpitation or syncope  Resp-- No SOB/cough/Hemoptysis  GI-as above.  No hematochezia, melena, or hematemesis. Denies jaundice or chronic liver disease.  -- No dysuria, hematuria, or flank pain.  Denies hesitancy or flank pain.  Lymph- no swollen lymph nodes in neck/axilla or groin.   Heme- No active bruising or bleeding; no Hx of DVT or PE.  MS-- no swelling or pain in the bones or joints of arms/legs.  No new back pain.  Neuro-- No acute focal weakness or numbness in the arms or legs.  No seizures.     Full 12 point review of systems reviewed and negative otherwise for acute complaints, except for above      PE:  "    /95 (BP Location: Left arm, Patient Position: Lying)   Pulse 98   Temp 98.3 °F (36.8 °C) (Oral)   Resp 18   Ht 190.5 cm (75\")   Wt 123 kg (271 lb 8 oz)   SpO2 97%   BMI 33.94 kg/m²      GENERAL: Awake and alert, in no acute distress.   HEENT: Normocephalic, atraumatic.  PERRL. EOMI. No conjunctival injection. No icterus. Oropharynx clear without evidence of thrush or exudate. No evidence of peridontal disease.    NECK: Supple without nuchal rigidity. No mass.  LYMPH: No cervical, axillary or inguinal lymphadenopathy.  HEART: RRR; No murmur, rubs, gallops.   LUNGS: Diminished at bases to auscultation bilaterally with slight rhonchi bilateral but no wheezing or rales. Normal respiratory effort. Nonlabored. No dullness.  ABDOMEN: Soft, nontender, nondistended. Positive bowel sounds. No rebound or guarding. NO mass or HSM.  EXT:  No cyanosis, clubbing or edema. No cord.  :  erythema/sattelite lesions lesions  MSK: FROM without joint effusions noted arms/legs.    SKIN: Warm and dry without cutaneous eruptions on Inspection/palpation.    NEURO: Oriented to PPT. No focal deficits on motor/sensory exam at arms/legs.     No peripheral stigmata/phenomena of endocarditis     PICC line without obvious redness or drainage     Laboratory Data    Results from last 7 days   Lab Units 11/10/19  0904   WBC 10*3/mm3 10.59   HEMOGLOBIN g/dL 9.7*   HEMATOCRIT % 32.2*   PLATELETS 10*3/mm3 339     Results from last 7 days   Lab Units 11/10/19  0904   SODIUM mmol/L 140   POTASSIUM mmol/L 4.0   CHLORIDE mmol/L 104   CO2 mmol/L 25.0   BUN mg/dL 14   CREATININE mg/dL 0.59*   GLUCOSE mg/dL 202*   CALCIUM mg/dL 8.5*                   Estimated Creatinine Clearance: 209.3 mL/min (A) (by C-G formula based on SCr of 0.59 mg/dL (L)).      Microbiology:      Radiology:  Imaging Results (last 72 hours)     Procedure Component Value Units Date/Time    CT Abdomen Pelvis With Contrast [[id number]] Collected:  10/26/19 2035     Updated: "  10/26/19 2037    Narrative:       CT ABDOMEN AND PELVIS WITH CONTRAST, 10/26/2019    HISTORY:  49-year-old male with recent history of a right perinephric fluid collection that underwent diagnostic needle aspiration 10/9/2019. He has been on IV antibiotic therapy for possible perinephric abscess. Examination is requested today for follow-up.    TECHNIQUE:  CT imaging of the abdomen and pelvis with IV contrast. Radiation dose reduction techniques included automated exposure control. Radiation audit for CT and nuclear cardiology exams in the last 12 months: 0.    ABDOMEN FINDINGS:  Rim-enhancing pericapsular or subcapsular fluid collection along the posterior margin of the lower pole right kidney has enlarged since 10/18/2019. It previously measured about 5.7 x 3.9 cm axially and currently measures 6.0 x 4.8 cm in the same location  (see axial image 48). There is associated perinephric soft tissue stranding, most likely inflammatory. The findings are compatible with clinically suspected perinephric abscess.    Both kidneys enhance normally. There is no evidence of upper urinary tract obstruction.    Liver, pancreas and spleen are within normal limits. No bile duct or pancreatic duct dilatation.    Small bowel and colon are normal in caliber and appearance without GI contrast. Normal appendix. No gastric distention, hiatal hernia or distal esophageal dilatation.    PELVIS FINDINGS:  Urinary bladder, prostate and rectum are within normal limits.    Lung base images show a tiny right pleural effusion.      Impression:       1.  Likely perinephric or subcapsular abscess along the posterior aspect of the lower pole right kidney. This has enlarged since 10/18/2019.  2.  Normal renal parenchymal enhancement. No CT evidence of pyelonephritis at this time. No evidence of upper urinary tract obstruction.  3.  Tiny right pleural effusion.    Signer Name: Fadi Healy MD   Signed: 10/26/2019 8:35 PM   Workstation Name:  [email or web address]    [facility]            Impression:      --Acute Kleb septicemia/sepsis on treatment since last admit, likely urinary source/pyelnoephritis associated with urinary retention and  with abnormal CT scan with perinephric collection/abscess and Kleb on aspirate that has persisted as of aspirate 10/29 arguing relatively sequestered focus not penetrated by systemic abx and now with drain placed 10/29;  IV  Zosyn ongoing; urology to determine any timing/option or threshold for further intervention such as surgical debridement if not responsive to drain/abx, or other functional/anatomic assessment.  DRAIN INADVERTENTLY OUT 11/4;  Monitor clinically,   D/w Dr Forbes; ultrasound done on November 8 with no mention of abscess by radiology.  Sought  radiology clarification as to whether this modality is good enough in comparison to prior CT scans to know whether abscess is better/gone versus need for different modality such as repeat CT scan to better clarify. I d/w Dr Escobedo 11/11 and he reviewed the US from 11/8 and he reports to me US is adequate for followup on this and NO current evidence for abscess, no need for CT scan at present to evaluate abscess per d/w him and given clinical improvements/radiographic improvements     --Acute perinephric abscess as above;   Urology following to help guide timing/option/threshold for further drainage (perc -v- other);  Perc drain 10/29 and kleb persisted in culture; see above regarding repeat/recent scans    --urinary frequency with cutaneous candidiasis and candiduria/pyruria, improved;  S/p diflucan     --abnormal imaging with dry cough and probable pneumonia last admission;  At risk for aspiration with recent vomiting/dry heaves and empiric zosyn started 10/19; changed to Invanz 10/23 to help facilitate home therapy with mom and changed back to Zosyn October 28;   no productive cough at present, but if it develops, then send for  culture     --acute  diarrhea 10/27-10/28 with Hx CDiff per him/family and recent empiric oral vancomycin, CDT negative and oral vancomycin stopped;  If recurrent diarrhea then consider more wrolup or empiric therapy     --Hx perirectal abscess ; Colorectal surgery, Dr. Payne previously saw and is following.  Drainage as above on August 2 with mxed Fecal sherly in culture; CT scans  with no mention of abscess on 8/18 study;   any timing/option or threshold for further GI/colorectal work-up per Dr payne/CRS     --History urinary retention.  urology following     --Hx ARF in August 2019;  ?obstruction initially associated with retention postop (1200 out with I/O cath postop per nursing) -v- contrast from CT -v- lisinopril/medication/vancomycin -v- relative hypotension -v- likely combination of factors with ATN;  vancomycin trough high and vancomycin stopped empirically 8/3 although high trough potentially accumulation as a consequence of diminishing renal function associated with retention/contrast/pressure rather than cause.  Nonetheless, avoiding for now; likely further acute kidney injury at admission August 17 associated with dehydration/prerenal/ATN etiology     --History MRSA;  Not in current culture     --Diabetes mellitus type 2, uncontrolled.  He reports the reason for this is forgetfulness in past     --History [place] and chronic liver disease/cirrhosis  per past notes     --Left BKA     --Peripheral vascular disease     --medical noncompliance and inability to care for self at home.       PLAN:      --IV Zosyn to finish today; s/p  IV diflucan      --Check/review labs cultures and scans     --Discussed with microbiology     --History per nursing staff      --Discussed with family, partial history per them     --Highly complex set of issues with high risk for further serious morbidity and other serious sequela     --Colorectal surgery and urology have followed; urology following to determine timing/option or  "threshold for further percutaneous aspiration/drainage versus other surgery regarding  Abscess and to guide further workup for air in urine     --D/w Dr Escobedo as above    **he is at end of 2 weeks IV antibiotics from drain placement on 10/29 and clinically better, repeat imaging/US no abscess per my discussion with raiology as above        Usman Dong MD  2019        Electronically signed by Usman Dong MD at 19 2095     Santa Cope PA-C at 19 [phone]              [facility] Medicine Services  PROGRESS NOTE    Patient Name: Kendrick Crystal  : 1970  MRN: [id number]    Date of Admission: 10/26/2019  Primary Care Physician: Provider, No Known    Subjective     CC: f/u weakness    HPI:  Laying in bed. Wife in recliner. Fell asleep multiple times during interview - wife apologetic. I became firm with the patient, explaining to him that PT/OT had signed off and were no longer working with him due to his refusal to work with them. He stated that he would only go to [facility] - I noted that [facility] had declined and offered him other rehab facility options, which he refused. \"I'll just go home then.\"     Review of Systems  Gen- No fevers, chills  CV- No chest pain, palpitations  Resp- No cough, dyspnea  GI- No N/V/D, abd pain    Objective     Vital Signs:   Temp:  [97.3 °F (36.3 °C)-97.8 °F (36.6 °C)] 97.3 °F (36.3 °C)  Heart Rate:  [74-95] 78  Resp:  [14-18] 18  BP: (117-160)/() 149/92        Physical Exam:  Constitutional: No acute distress, lying with eyes closed in bed, falls asleep easily. Wife sitting up in chair   HENT: NCAT, mucous membranes moist  Respiratory: Clear to auscultation bilaterally, respiratory effort normal   Cardiovascular: RRR, s1 and s2  Gastrointestinal: Positive bowel sounds, soft, nontender, nondistended  Musculoskeletal: No right lower extremity edema, left AKA  Psychiatric: Flat affect, minimally " cooperative  Neurologic: Oriented x 3, strength symmetric in all extremities, Cranial Nerves grossly intact to confrontation, speech clear  Skin: No rashes    Results Reviewed:    Results from last 7 days   Lab Units 11/10/19  0904 11/07/19  0621   WBC 10*3/mm3 10.59 11.73*   HEMOGLOBIN g/dL 9.7* 9.6*   HEMATOCRIT % 32.2* 31.3*   PLATELETS 10*3/mm3 339 386     Results from last 7 days   Lab Units 11/10/19  0904 11/07/19  0634   SODIUM mmol/L 140 136   POTASSIUM mmol/L 4.0 3.8   CHLORIDE mmol/L 104 98   CO2 mmol/L 25.0 25.0   BUN mg/dL 14 10   CREATININE mg/dL 0.59* 0.62*   GLUCOSE mg/dL 202* 186*   CALCIUM mg/dL 8.5* 8.5*   ALT (SGPT) U/L  --  21   AST (SGOT) U/L  --  24     Estimated Creatinine Clearance: 209.3 mL/min (A) (by C-G formula based on SCr of 0.59 mg/dL (L)).    Microbiology Results Abnormal     Procedure Component Value - Date/Time    Ova & Parasite Examination - Stool, Per Rectum [[id number]] Collected:  10/30/19 0507    Lab Status:  Final result Specimen:  Stool from Per Rectum Updated:  11/05/19 1353     Ova + Parasite Exam No ova or parasites seen on concentrated smear     Trichrome Stain No ova or parasites seen on trichrome stain    Stool Culture (Reference Lab) - Stool, Per Rectum [[id number]] Collected:  10/30/19 0507    Lab Status:  Final result Specimen:  Stool from Per Rectum Updated:  11/04/19 1708     Salmonella/Shigella Screen Final report     Result 1 Comment     Comment: No Salmonella or Shigella recovered.        Campylobacter Culture Final report     Result 1 Comment     Comment: No Campylobacter species isolated.        E coli, Shiga toxin Assay Negative    Narrative:       Performed at:  [address] [place] [address]  [id number]  : Dl Laird PhD, Phone:  [phone]    Gastrointestinal Panel, PCR ([facility]) - Stool, Per Rectum [[id number]] Collected:  10/30/19 0507    Lab Status:  Final result Specimen:  Stool from Per Rectum Updated:  11/01/19  1357     Reference Lab Report See Attached Report     Comment: See scanned report       Blood Culture - Blood, Wrist, Right [[id number]] Collected:  10/26/19 1805    Lab Status:  Final result Specimen:  Blood from Wrist, Right Updated:  10/31/19 2000     Blood Culture No growth at 5 days    Blood Culture - Blood, Hand, Right [[id number]] Collected:  10/26/19 1822    Lab Status:  Final result Specimen:  Blood from Hand, Right Updated:  10/31/19 2000     Blood Culture No growth at 5 days    Body Fluid Culture - Body Fluid, Kidney, Right [[id number]]  (Abnormal)  (Susceptibility) Collected:  10/29/19 1057    Lab Status:  Final result Specimen:  Body Fluid from Kidney, Right Updated:  10/31/19 0624     Body Fluid Culture Scant growth (1+) Klebsiella pneumoniae ssp pneumoniae     Gram Stain Moderate (3+) WBCs seen      No organisms seen    Susceptibility      Klebsiella pneumoniae ssp pneumoniae     EYAD     Ampicillin Resistant     Ampicillin + Sulbactam Susceptible     Cefazolin Susceptible     Cefepime Susceptible     Ceftazidime Susceptible     Ceftriaxone Susceptible     Gentamicin Susceptible     Levofloxacin Susceptible     Piperacillin + Tazobactam Susceptible     Trimethoprim + Sulfamethoxazole Susceptible                    Clostridium Difficile Toxin - Stool, Per Rectum [[id number]] Collected:  10/30/19 0507    Lab Status:  Final result Specimen:  Stool from Per Rectum Updated:  10/30/19 0847    Narrative:       The following orders were created for panel order Clostridium Difficile Toxin - Stool, Per Rectum.  Procedure                               Abnormality         Status                     ---------                               -----------         ------                     Clostridium Difficile To...[[id number]]  Normal              Final result                 Please view results for these tests on the individual orders.    Clostridium Difficile Toxin, PCR - Stool, Per Rectum [[id number]]  (Normal)  Collected:  10/30/19 0507    Lab Status:  Final result Specimen:  Stool from Per Rectum Updated:  10/30/19 0826     C. Difficile Toxins by PCR Not Detected    Narrative:       Performance characteristics of test not established for patients <2 years of age.  Negative for Toxigenic C. Difficile    Urine Culture - Urine, Urine, Clean Catch [[id number]]  (Abnormal) Collected:  10/28/19 1341    Lab Status:  Final result Specimen:  Urine, Clean Catch Updated:  10/29/19 1033     Urine Culture Yeast isolated        Imaging Results (Last 24 Hours)     ** No results found for the last 24 hours. **        Results for orders placed during the hospital encounter of 05/18/19   Adult Transthoracic Echo Complete W/ Cont if Necessary Per Protocol    Narrative · Estimated EF = 60%.  · Mild mitral valve regurgitation is present  · Mild tricuspid valve regurgitation is present.  · Calculated right ventricular systolic pressure from tricuspid   regurgitation is 29 mmHg.        I have reviewed the medications:  Scheduled Meds:    amLODIPine 10 mg Oral Q24H   aspirin 81 mg Oral Daily   DULoxetine 60 mg Oral Daily   heparin (porcine) 5,000 Units Subcutaneous Q8H   hydrALAZINE 25 mg Oral Q8H   insulin detemir 22 Units Subcutaneous Q12H   insulin lispro 0-7 Units Subcutaneous 4x Daily With Meals & Nightly   insulin lispro 5 Units Subcutaneous TID With Meals   metoprolol tartrate 100 mg Oral Q12H   nystatin  Topical BID   piperacillin-tazobactam 3.375 g Intravenous Q8H   saccharomyces boulardii 250 mg Oral BID   sodium chloride 10 mL Intravenous Q12H   terazosin 5 mg Oral Nightly     Continuous Infusions:   PRN Meds:  •  acetaminophen **OR** acetaminophen **OR** acetaminophen  •  dextrose  •  dextrose  •  glucagon (human recombinant)  •  loperamide  •  magnesium sulfate **OR** magnesium sulfate **OR** magnesium sulfate  •  ondansetron  •  oxyCODONE-acetaminophen  •  potassium chloride **OR** potassium chloride **OR** potassium chloride  •   Insert peripheral IV **AND** sodium chloride  •  sodium chloride    Assessment / Plan     Active Hospital Problems    Diagnosis  POA   • Pneumaturia [R39.89]  Clinically Undetermined   • Perinephric abscess [N15.1]  Yes   • Peripheral vascular disease (CMS/[facility]) [I73.9]  Yes   • S/P BKA (below knee amputation), left (CMS/[facility]) [Z89.512]  Not Applicable   • Diabetic ulcer of right lower leg (CMS/[facility]) [E11.622, L97.919]  Yes   • Type 2 diabetes mellitus with circulatory disorder and uncontrolled [E11.59]  Yes   • Hyperlipemia [E78.5]  Yes   • Essential hypertension [I10]  Yes      Resolved Hospital Problems   No resolved problems to display.     Brief Hospital Course to date:  Kendrick Crystal is a 49 y.o. male who presented to the ER with weakness.  Discharged 10/23 - admitted again on 10/26    High Risk for Re-admission  - discharged on 10/23 - readmit 10/26  - high LACE score  - history of compliance issues    Perinephric Abscess  - Urology following  - CRS examined patient and there was no evidence of rectal mass or abscess, no immediate need for colonoscopy   --per ID notes, last US revealed no abscess present on 11/7/19 that was discussed/ verified by radiology and ID  --Discussed with Dr Dong today - he plans to discontinue IV Zosyn tomorrow      Uncontrolled Diabetes  - long history of poorly controlled DM  - mostly A1Cs >13  - high sugars for over 2 yrs, likely longer    Obesity  - suspected undiagnosed sleep apnea    Depression  - continue Cymbalta 60 mg daily    Debility  - s/p Below Knee Amputation  - immobility  - Refusing to work with PT/OT and refusing to go to any rehab facility other than [facility]. [facility] has declined to offer him a bed.  has arrange ambulance to home for 11/14 - he is extremely high risk for readmission     Hypertension  - on Norvasc, hydralazine, metoprolol, terazosin    DVT Prophylaxis:  Heparin 5,000 Units SC every 8 hours  Disposition: I expect the patient to be  discharged tomorrow     CODE STATUS:   Code Status and Medical Interventions:   Ordered at: 10/26/19 2039     Level Of Support Discussed With:    Patient     Code Status:    CPR     Medical Interventions (Level of Support Prior to Arrest):    Full     Electronically signed by Santa Cope PA-C, 11/13/19, 3:09 PM.      Electronically signed by Santa Cope PA-C at 11/13/19 1522          Physical Therapy Notes (most recent note)      Angela Alicea, PT at 11/09/19 1102  Version 1 of 1         Problem: Patient Care Overview  Goal: Plan of Care Review  Outcome: Ongoing (interventions implemented as appropriate)   11/09/19 1059   Coping/Psychosocial   Plan of Care Reviewed With patient;spouse   Plan of Care Review   Progress no change   OTHER   Outcome Summary PT re-eval completed. Pt agrees to EOB seated BUE and BLE ther ex only. He declines to attempt transfer training or assist with bed mobility despite max encouragemeng and education, CM notified. Pt denies pain throughout, VSS. Cont with IPPT 3x/week and recommend SNF upon dc to improve safety and independence with mobility. Consider increasing freq of PT once pt's participation improves.            Electronically signed by Angela Alicea, PT at 11/09/19 1102          Occupational Therapy Notes (most recent note)      Elis Franco OTR at 11/11/19 1114          Problem: Patient Care Overview  Goal: Plan of Care Review  Outcome: Ongoing (interventions implemented as appropriate)   11/11/19 1010   Coping/Psychosocial   Plan of Care Reviewed With patient   OTHER   Outcome Summary pt requires signficant encouragement for participation; only agreeable to supine t-band exercises.            Electronically signed by Elis Franco OTR at 11/11/19 1114

## 2019-11-14 NOTE — SIGNIFICANT NOTE
Charge RN from 2F/2G reported to room upon request of [facility] to remove PICC Line.   Pt laid flat.  PICC Line removed, Length at 46.  Pressure applied, petroleum dressing, dry gauze, and all inculusive dsg placed on wound. Pt tolerated well.      Documentation of removal and puncture wound placed in Epic.

## 2019-11-14 NOTE — DISCHARGE SUMMARY
[facility] Medicine Services  DISCHARGE SUMMARY    Patient Name: Kendrick Crystal  : 1970  MRN: [id number]    Date of Admission: 10/26/2019  5:03 PM  Date of Discharge: 2019  Primary Care Physician: Provider, No Known    Consults     Date and Time Order Name Status Description    2019 1018 Inpatient Colorectal Surgery Consult Completed     10/28/2019 1832 Inpatient Diagnostic Radiology Consult      10/28/2019 0841 Inpatient Urology Consult      10/26/2019 2039 Inpatient Infectious Diseases Consult Completed     10/9/2019 1221 Inpatient Colorectal Surgery Consult Completed     10/9/2019 1052 Inpatient Infectious Diseases Consult Completed         Hospital Course     Presenting Problem:   Perinephric abscess [N15.1]  Perinephric abscess [N15.1]    Active Hospital Problems    Diagnosis  POA   • Gastroparesis due to secondary diabetes (CMS/[facility]) [E13.43]  Yes   • History of Clostridioides difficile colitis [Z86.19]  Not Applicable   • Bipolar disorder (CMS/[facility]) [F31.9]  Yes   • Pneumaturia [R39.89]  Clinically Undetermined   • Perinephric abscess [N15.1]  Yes   • Obesity (BMI 30-39.9) [E66.9]  Yes   • Peripheral vascular disease (CMS/[facility]) [I73.9]  Yes   • S/P BKA (below knee amputation), left (CMS/[facility]) [Z89.512]  Not Applicable   • Diabetic ulcer of right lower leg (CMS/[facility]) [E11.622, L97.919]  Yes   • Type 2 diabetes mellitus with circulatory disorder and uncontrolled [E11.59]  Yes   • Hypertriglyceridemia, essential [E78.1]  Yes   • Hyperlipemia [E78.5]  Yes   • Essential hypertension [I10]  Yes   • 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      Hospital Course:  Mr. Kendrick Crystal is a 49yoM with PMH significant for poorly-controlled insulin dependent DMII with diabetic peripheral neuropathy and gastroparesis, HTN, PAD, prior diabetic foot  ulcers and L BKA, DUNLAP cirrhosis, prior C. Diff colitis, bipolar disorder and medical noncompliance.     - Admitted to [facility] 4/24-5/3/2019 for abdominal wall abscess. He underwent debridement by Dr. Kern and discharged to [facility] on IV Zosyn/Daptomycin per [facility] recommendations.   - Admitted 5/18-5/28/19 for abdominal wall cellulitis and anasarca associated to DUNLAP cirrhosis. He was encouraged to return to [facility] but he discharged home with home health on PO Doxycycline.   - Admitted 5/30-6/2/2019 with intractable nausea/vomiting that resolved with discontinuation of Doxycycline. He discharged home off of antibiotics. He discharged home.   - Admitted 7/30-8/14/2019 with rectal pain and concerns for rectal abscess. Dr. Payne of [facility] performed trans-rectal drainage of a claudia-rectal abscess on 8/2/19. He developed JUAN and was followed by nephrology. He refused rehab placement and discharged home on PO Flagyn/Ceftin.   - Admitted 8/17-8/23/2019 with nausea/vomiting and inability to keep PO antibiotics down. CT A/P showed increased fluid collection density, no intervention was performed by [facility] (Elmer and Svetich both evaluated). He treated with Zosyn and Micafungin (candiduria) and discharged on PO Omnicef and topical antifungal for groin candidiasis. He discharged home.   Admitted 10/8-10/23/19 for sepsis secondary to klebsiella perinephric abscess, E. Coli UTI and Klebsiella bacteremia. He discharged home on Invanz 1g IV daily and PO Vancomycin for C. Diff ppx.     He returned to [facility] ED on 10/26/2019 with complaints of increasing weakness/fatigue/malaise and diarrhea. He reported that he developed diarrhea after eating chili for dinner the night prior to presentation. Wife also admitted to missing administration of 2 of 3 days of Invanz while at home.     CT abdomen/pelvis revealed a perinephric or sub-scapular abscess along the posterior aspect of the lower pole of the R kidney, enlarge from last  "scan on 10/18/19. WBCs 12.45. ID was consulted from the ED. Dr. Dong recommended transition to Meropenem. He was admitted to the hospital medicine service and treated with IV fluid and IV antibiotics. He received C. Diff ppx with PO Vancomycin.     Urology was consulted. Dr. Gutierres recommended CT-guided drain placement. This was performed on 10/29 with drainage of 12mL of fluid. Culture returned with Klebsiella. Urology did not recommend operative intervention. Antibiotics transitioned to IV Zosyn. He was treated for candiduria with IV Diflucan.     Drain was inadvertently removed. Due to pneumaturia, a VCUG was performed on 11/8 - no extravasation of contrast was identified to indicate presence of a fistula.     Mr. Crystal has completed 2 weeks of IV antibiotics following drain placement.     PT/OT followed the patient throughout his hospitalization. He was not compliant with working with therapy and [facility] declined his admission referral. He refused referrals to any other facilities in the [facility] or other surrounding counties and continues to refuse to work with therapy. Despite his high-risk for readmission, Mr. Crystal has met maximum medical benefit from this hospitalization and he is medically stable. He has been cleared for discharge by urology and infectious disease. We will discharge him home on 11/14/2019.     Case management has arranged primary care follow up for 11/19/19 @ 1:15pm    Day of Discharge     HPI:   Laying in bed, wife on couch. No new issues. Patient slept during conversation with wife (lots of questions about what to do if he cannot move at home and options for rehab following discharge - \"I don't know what else to do.\") Mr. Crystal continues to refuse to work with PT or transfer anywhere other than [facility]. I explained to patient and family that we have exhausted our options because he is unwilling to cooperate with recommendations and that while I do not recommend " discharge home, he has met criteria for discharge and we will no longer keep him in the hospital (nor would it be financially prudent to do so).     Wife requested insulin pens instead of bottle/syringe, which I stated I would prescribe     Review of Systems  Gen- No fevers, chills  CV- No chest pain, palpitations  Resp- No cough, dyspnea  GI- No N/V/D, abd pain    Otherwise ROS is negative except as mentioned in the HPI.    Vital Signs:   Temp:  [97.3 °F (36.3 °C)-98.3 °F (36.8 °C)] 97.6 °F (36.4 °C)  Heart Rate:  [78-99] 94  Resp:  [18] 18  BP: (137-160)/() 157/118     Physical Exam:  Constitutional: No acute distress. In bed, blanket over head. Minimally conversant   HENT: NCAT, mucous membranes moist  Respiratory: Clear to auscultation bilaterally, respiratory effort normal   Cardiovascular: RRR, no murmurs, rubs, or gallops, palpable pedal pulses bilaterally  Gastrointestinal: Positive bowel sounds, soft, abdomen is obese   Musculoskeletal: No right ankle edema  Psychiatric: Irritated affect, minimally cooperative with exam but not combative in any way   Neurologic: Moves all extremities spontaneously. Speech is clear and no focal deficits     Pertinent  and/or Most Recent Results     Results from last 7 days   Lab Units 11/10/19  0904   WBC 10*3/mm3 10.59   HEMOGLOBIN g/dL 9.7*   HEMATOCRIT % 32.2*   PLATELETS 10*3/mm3 339   SODIUM mmol/L 140   POTASSIUM mmol/L 4.0   CHLORIDE mmol/L 104   CO2 mmol/L 25.0   BUN mg/dL 14   CREATININE mg/dL 0.59*   GLUCOSE mg/dL 202*   CALCIUM mg/dL 8.5*     Brief Urine Lab Results  (Last result in the past 365 days)      Color   Clarity   Blood   Leuk Est   Nitrite   Protein   CREAT   Urine HCG        10/28/19 1341 Yellow Cloudy Negative Moderate (2+) Negative Negative             Microbiology Results Abnormal     Procedure Component Value - Date/Time    Ova & Parasite Examination - Stool, Per Rectum [[id number]] Collected:  10/30/19 050    Lab Status:  Final result  Specimen:  Stool from Per Rectum Updated:  11/05/19 1353     Ova + Parasite Exam No ova or parasites seen on concentrated smear     Trichrome Stain No ova or parasites seen on trichrome stain    Stool Culture (Reference Lab) - Stool, Per Rectum [[id number]] Collected:  10/30/19 0507    Lab Status:  Final result Specimen:  Stool from Per Rectum Updated:  11/04/19 1708     Salmonella/Shigella Screen Final report     Result 1 Comment     Comment: No Salmonella or Shigella recovered.        Campylobacter Culture Final report     Result 1 Comment     Comment: No Campylobacter species isolated.        E coli, Shiga toxin Assay Negative    Narrative:       Performed at:  [address]  [id number]  : Dl Laird PhD, Phone:  [phone]    Gastrointestinal Panel, PCR ([facility]) - Stool, Per Rectum [[id number]] Collected:  10/30/19 0507    Lab Status:  Final result Specimen:  Stool from Per Rectum Updated:  11/01/19 1357     Reference Lab Report See Attached Report     Comment: See scanned report       Blood Culture - Blood, Wrist, Right [[id number]] Collected:  10/26/19 1805    Lab Status:  Final result Specimen:  Blood from Wrist, Right Updated:  10/31/19 2000     Blood Culture No growth at 5 days    Blood Culture - Blood, Hand, Right [[id number]] Collected:  10/26/19 1822    Lab Status:  Final result Specimen:  Blood from Hand, Right Updated:  10/31/19 2000     Blood Culture No growth at 5 days    Body Fluid Culture - Body Fluid, Kidney, Right [[id number]]  (Abnormal)  (Susceptibility) Collected:  10/29/19 1057    Lab Status:  Final result Specimen:  Body Fluid from Kidney, Right Updated:  10/31/19 0624     Body Fluid Culture Scant growth (1+) Klebsiella pneumoniae ssp pneumoniae     Gram Stain Moderate (3+) WBCs seen      No organisms seen    Susceptibility      Klebsiella pneumoniae ssp pneumoniae     EYAD     Ampicillin Resistant     Ampicillin + Sulbactam Susceptible      Cefazolin Susceptible     Cefepime Susceptible     Ceftazidime Susceptible     Ceftriaxone Susceptible     Gentamicin Susceptible     Levofloxacin Susceptible     Piperacillin + Tazobactam Susceptible     Trimethoprim + Sulfamethoxazole Susceptible                    Clostridium Difficile Toxin - Stool, Per Rectum [[id number]] Collected:  10/30/19 0507    Lab Status:  Final result Specimen:  Stool from Per Rectum Updated:  10/30/19 0826    Narrative:       The following orders were created for panel order Clostridium Difficile Toxin - Stool, Per Rectum.  Procedure                               Abnormality         Status                     ---------                               -----------         ------                     Clostridium Difficile To...[[id number]]  Normal              Final result                 Please view results for these tests on the individual orders.    Clostridium Difficile Toxin, PCR - Stool, Per Rectum [[id number]]  (Normal) Collected:  10/30/19 0507    Lab Status:  Final result Specimen:  Stool from Per Rectum Updated:  10/30/19 0826     C. Difficile Toxins by PCR Not Detected    Narrative:       Performance characteristics of test not established for patients <2 years of age.  Negative for Toxigenic C. Difficile    Urine Culture - Urine, Urine, Clean Catch [[id number]]  (Abnormal) Collected:  10/28/19 1341    Lab Status:  Final result Specimen:  Urine, Clean Catch Updated:  10/29/19 1033     Urine Culture Yeast isolated        Imaging Results (All)     Procedure Component Value Units Date/Time    FL Voiding Urethrocystogram [[id number]] Collected:  11/08/19 1632     Updated:  11/10/19 1801    Narrative:       EXAMINATION: FL VOIDING URETHROCYSTOGRAM-     INDICATION: pneumaturia in pt with previous claudia-scrotal abscess.   Suspect urethral source.  cysto neg.; N15.1-Renal and perinephric  abscess; Z86.19-Personal history of other infectious and parasitic  diseases; Z87.898-Personal history of  other specified conditions;  E11.51-Type 2 diabetes mellitus with diabetic peripheral angiopathy  without gangrene; E11.65-Type 2 diabetes mellitus with hyperglycemia;  I10-Ess     TECHNIQUE: 2 minutes and 12 seconds of fluoroscopic time was used for  this exam. 5 associated images were saved.  imaging reveals a  nonobstructive bowel gas pattern. The patient arrived at the fluoroscopy  suite with a Walker catheter in situ. A single contrast study using  Cysto-Conray contrast media was performed. The bladder was filled in a  retrograde fashion.     COMPARISON: NONE     FINDINGS: Contrast was seen filling the bladder. The bladder mucosa  appeared grossly normal. No fixed filling defects were seen.  Vesicoureteral reflux was not noted during this exam. No extravasation  of contrast was seen. The urethra appeared within normal limits on  voiding images. No strictures, or mucosal irregularities of the urethra  were seen.       Impression:       Fluoroscopic guided voiding cystourethrogram series appeared  within normal limits.      This report was finalized on 11/10/2019 5:58 PM by Dr. Jb Campos.       US Abdomen Complete [[id number]] Collected:  11/08/19 0749     Updated:  11/10/19 1756    Narrative:       EXAMINATION: US ABDOMEN COMPLETE-     INDICATION: Abdominal pain and distention, history of ascites, DUNLAP  cirrhosis; N15.1-Renal and perinephric abscess; Z86.19-Personal history  of other infectious and parasitic diseases; Z87.898-Personal history of  other specified conditions; E11.51-Type 2 diabetes mellitus with  diabetic peripheral angiopathy without gangrene; E11.65-Type 2 diabetes  mellitus with hyperglycemia; I10-Essential (primary) hypertension.      TECHNIQUE: Ultrasound abdomen complete.     COMPARISON: None.     FINDINGS: Visualized portions of the aorta and IVC are grossly  unremarkable.     Visualized portions of the pancreas within normal limits.     Liver demonstrates coarsened echotexture  without focal parenchymal  lesion.     Gallbladder is present without cholelithiasis, gallbladder wall  thickening or pericholecystic fluid demonstrating minimal debris in the  dependent portion.     Common bile duct measures 2 mm in diameter at the level of the mimi  hepatis.     Right kidney measures 11 cm in length without evidence of  hydronephrosis, contour deforming mass or obvious calculi.     Left kidney measures 12 cm in length without evidence of hydronephrosis,  contour deforming mass or obvious calculi.     Spleen measures 15 cm enlarged in size with a single 1.4 cm hypoechoic  area of likely simple cyst associated.     No ascites.       Impression:       1. Mildly coarsened echotexture of the hepatic parenchyma may represent  hepatic parenchymal process. No focal liver lesion.  2. Splenomegaly measuring up to 15 cm in craniocaudal dimension.  3. No ascites.      D:  11/08/2019  E:  11/08/2019     This report was finalized on 11/10/2019 5:53 PM by Dr. Jb Campos.       US Abdomen Limited [[id number]] Collected:  10/31/19 0924     Updated:  11/01/19 0939    Narrative:       EXAMINATION: US ABDOMEN LIMITED- 10/30/2019     INDICATION: RUQ pain; N15.1-Renal and perinephric abscess;  Z86.19-Personal history of other infectious and parasitic diseases;  Z87.898-Personal history of other specified conditions; E11.51-Type 2  diabetes mellitus with diabetic peripheral angiopathy without gangrene;  E11.65-Type 2 diabetes mellitus with hyperglycemia; I10-Essential  (primary) hypertension; Z89.512-Acquired absence of left leg below knee;  Z74 upper abdominal pain     TECHNIQUE: Sonographic imaging was obtained of the right upper quadrant  in both the sagittal and transverse planes.     COMPARISON: NONE     FINDINGS: Pancreas is not well seen. No gross mass or abnormal lesion  seen within the pancreatic region. The liver is heterogeneous in  appearance. There is no focal mass or intrahepatic biliary  ductal  dilatation. The gallbladder reveals no evidence of stones however the  gallbladder is somewhat difficult to clear. There is no wall thickening.  No pericholecystic fluid. The common bile duct measures 5 mm. The right  kidney is normal in size, configuration, and texture measuring in length  from pole to pole 11.6 cm. There is no solid cortical mass or renal  cortical cysts seen within the right kidney. No hydronephrosis or  nephrolithiasis. Incidental small right pleural effusion.       Impression:       Heterogeneous appearance of the liver with no underlying  mass or intrahepatic biliary ductal dilatation. Incidental small right  pleural effusion. No stones in the gallbladder. The remainder of the  right upper quadrant ultrasound is unremarkable.      D:  10/31/2019  E:  10/31/2019     This report was finalized on 11/1/2019 9:36 AM by Dr. Nella Enriquez MD.       CT Guided Abscess Drain Kidney Renal [[id number]] Collected:  10/29/19 1214     Updated:  10/30/19 0904    Narrative:       EXAMINATION: CT GUIDED ABSCESS DRAIN KIDNEY RENAL- 10/29/2019     INDICATION: right renal abscess; N15.1-Renal and perinephric abscess;  Z86.19-Personal history of other infectious and parasitic diseases;  Z87.898-Personal history of other specified conditions; E11.51-Type 2  diabetes mellitus with diabetic peripheral angiopathy without gangrene;  E11.65-Type 2 diabetes mellitus with hyperglycemia; I10-Essential  (primary) hypertension; Z89.512-Acquired absence of left leg below     TECHNIQUE: The patient was referred to the CT fluoroscopy procedure  suite for right-sided perinephric abscess drainage and catheter  placement. Per history the patient underwent aspiration of this fluid  collection on 10/09/2019. The procedure was discussed in detail with the  patient including the risks of the procedure. Risks discussed included  bleeding, infection, damage to surrounding structures including vascular  structures, and the  need for further procedure. The patient indicated  understanding and requested proceeding with the exam. A timeout  procedure was performed and the correct patient, site, and coagulation  factors were discussed with the nursing staff present. A 25-gauge needle  anesthetized the subcutaneous tissue, where then a 22-gauge spinal  needle was then advanced through the subcutaneous tissue, abdominal  wall, and perinephric capsule where lidocaine was administered for  anesthetic purposes. At this time 1 mg of Versed and 0.25 mcg of  fentanyl was administered via intravenous by dedicated nursing staff.  Dedicated nursing staff monitored vitals throughout the entire course of  the procedure. An 18-gauge Chiba was then directed under meticulous CT  guidance into the fluid collection were aspiration of purulent material  was obtained. At this time a 034 guidewire was then advanced through the  Chiba needle and into the perinephric fluid collection. Additional  imaging confirmed placement within the perinephric fluid collection. A  small amount of wire was coiled within the subcutaneous tissue which was  easily straightened and a 7 Yakut dilator was then utilized to the  abdominal wall musculature. At this time an 8.5 Yakut curved catheter  was then inserted over the guidewire and inserted into the perinephric  fluid collection where there was aspiration of purulent material once  again. The guidewire was then removed. Post procedural imaging  demonstrated the coiled pigtail catheter within the fluid collection.      The drain was then sewn into place on the skin and an overlying stay fix  device was placed. A Gus-Silvestre drain was then attached to the end of  the drainage catheter where there was free flow of tannish purulent  material. Postprocedural imaging did not demonstrate evidence of an  immediate complication and the patient tolerated the procedure well. The  patient was transferred to the floor in stable  condition.     The radiation dose reduction device was turned on for each scan per the  ALARA (As Low as Reasonably Achievable) protocol.     COMPARISON: CT abdomen and pelvis 10/26/2019 and CT procedure 10/09/2019     FINDINGS: Perinephric abscess is again identified which has enlarged  from 10/09/2019 and is better fully evaluated on the 10/26/2019 exam.  The CT scan was performed for preprocedural planning purposes only.       Impression:       Successful placement of a right perinephric abscess 8.5  Sinhala drainage catheter with purulent material aspirated and sent to  pathology for further evaluation.     D:  10/29/2019  E:  10/29/2019     This report was finalized on 10/30/2019 9:01 AM by Dr. Ángel Escobedo MD.       CT Abdomen Pelvis With Contrast [[id number]] Collected:  10/26/19 2035     Updated:  10/26/19 2037    Narrative:       CT ABDOMEN AND PELVIS WITH CONTRAST, 10/26/2019    HISTORY:  49-year-old male with recent history of a right perinephric fluid collection that underwent diagnostic needle aspiration 10/9/2019. He has been on IV antibiotic therapy for possible perinephric abscess. Examination is requested today for follow-up.    TECHNIQUE:  CT imaging of the abdomen and pelvis with IV contrast. Radiation dose reduction techniques included automated exposure control. Radiation audit for CT and nuclear cardiology exams in the last 12 months: 0.    ABDOMEN FINDINGS:  Rim-enhancing pericapsular or subcapsular fluid collection along the posterior margin of the lower pole right kidney has enlarged since 10/18/2019. It previously measured about 5.7 x 3.9 cm axially and currently measures 6.0 x 4.8 cm in the same location  (see axial image 48). There is associated perinephric soft tissue stranding, most likely inflammatory. The findings are compatible with clinically suspected perinephric abscess.    Both kidneys enhance normally. There is no evidence of upper urinary tract obstruction.    Liver, pancreas  and spleen are within normal limits. No bile duct or pancreatic duct dilatation.    Small bowel and colon are normal in caliber and appearance without GI contrast. Normal appendix. No gastric distention, hiatal hernia or distal esophageal dilatation.    PELVIS FINDINGS:  Urinary bladder, prostate and rectum are within normal limits.    Lung base images show a tiny right pleural effusion.      Impression:       1.  Likely perinephric or subcapsular abscess along the posterior aspect of the lower pole right kidney. This has enlarged since 10/18/2019.  2.  Normal renal parenchymal enhancement. No CT evidence of pyelonephritis at this time. No evidence of upper urinary tract obstruction.  3.  Tiny right pleural effusion.    Signer Name: Fadi Healy MD   Signed: 10/26/2019 8:35 PM   Workstation Name: [id number]    [facility]        Results for orders placed during the hospital encounter of 05/18/19   Adult Transthoracic Echo Complete W/ Cont if Necessary Per Protocol    Narrative · Estimated EF = 60%.  · Mild mitral valve regurgitation is present  · Mild tricuspid valve regurgitation is present.  · Calculated right ventricular systolic pressure from tricuspid   regurgitation is 29 mmHg.        Discharge Details        Discharge Medications      New Medications      Instructions Start Date   hydrALAZINE 25 MG tablet  Commonly known as:  APRESOLINE   25 mg, Oral, Every 8 Hours Scheduled      Insulin Glargine 100 UNIT/ML injection pen  Commonly known as:  LANTUS SOLOSTAR  Replaces:  LANTUS 100 UNIT/ML injection   20 Units, Subcutaneous, Every 12 Hours      Insulin Lispro (1 Unit Dial) 100 UNIT/ML solution pen-injector  Commonly known as:  HUMALOG  Replaces:  insulin lispro 100 UNIT/ML injection   4 Units, Subcutaneous, 3 Times Daily With Meals      oxyCODONE-acetaminophen 5-325 MG per tablet  Commonly known as:  PERCOCET  Replaces:  oxyCODONE-acetaminophen  MG per tablet   1 tablet,  Oral, 2 Times Daily PRN      Pen Needles 30G X 8 MM misc   1 each, Does not apply, 5 Times Daily, Use to administer insulin 5 times per day         Changes to Medications      Instructions Start Date   DULoxetine 60 MG capsule  Commonly known as:  CYMBALTA  What changed:    · medication strength  · how much to take   60 mg, Oral, Daily   Start Date:  11/15/2019        Continue These Medications      Instructions Start Date   amLODIPine 10 MG tablet  Commonly known as:  NORVASC   10 mg, Oral, Every 24 Hours Scheduled      aspirin 81 MG tablet   81 mg, Oral, Daily      metoprolol tartrate 100 MG tablet  Commonly known as:  LOPRESSOR   100 mg, Oral, Every 12 Hours      nystatin 276609 UNIT/GM cream  Commonly known as:  MYCOSTATIN   Topical, 2 Times Daily      terazosin 5 MG capsule  Commonly known as:  HYTRIN   5 mg, Oral, Nightly         Stop These Medications    ertapenem 1 gm/100ml solution IV  Commonly known as:  INVanz     insulin lispro 100 UNIT/ML injection  Commonly known as:  humaLOG  Replaced by:  Insulin Lispro (1 Unit Dial) 100 UNIT/ML solution pen-injector     LANTUS 100 UNIT/ML injection  Generic drug:  insulin glargine  Replaced by:  Insulin Glargine 100 UNIT/ML injection pen     oxyCODONE-acetaminophen  MG per tablet  Commonly known as:  PERCOCET  Replaced by:  oxyCODONE-acetaminophen 5-325 MG per tablet     saccharomyces boulardii 250 MG capsule  Commonly known as:  FLORASTOR     sevelamer 0.8 g pack packet  Commonly known as:  RENVELA     vancomycin 50 MG/ML reconstituted solution oral solution reconstituted     witch hazel-glycerin pad  Commonly known as:  TUCKS          No Known Allergies    Discharge Disposition:  Home or Self Care    Diet:  Hospital:  Diet Order   Procedures   • Diet Regular; Consistent Carbohydrate     CODE STATUS:    Code Status and Medical Interventions:   Ordered at: 10/26/19 2039     Level Of Support Discussed With:    Patient     Code Status:    CPR     Medical  Interventions (Level of Support Prior to Arrest):    Full     Future Appointments   Date Time Provider Department Center   11/19/2019  1:15 PM Stanley Carrasco DO [facility] None     Additional Instructions for the Follow-ups that You Need to Schedule     Ambulatory Referral to Home Health   As directed      Face to Face Visit Date:  11/13/2019    Follow-up provider for Plan of Care?:  I treated the patient in an acute care facility and will not continue treatment after discharge.    Follow-up provider:  URIEL COPE [[id number]]    Reason/Clinical Findings:  Perinephric abcess    Describe mobility limitations that make leaving home difficult:  Impaired mobility    Nursing/Therapeutic Services Requested:  Physical Therapy Occupational Therapy Skilled Nursing    Skilled nursing orders:  Wound care dressing/changes    Instructions:  Right ankle    PT orders:  Therapeutic exercise Gait Training Transfer training Strengthening Home safety assessment    Weight Bearing Status:  Full Weight Bearing    Occupational orders:  Activities of daily living Energy conservation Strengthening Home safety assessment    Frequency:  1 Week 1             Time Spent on Discharge: 50 minutes    Electronically signed by Uriel Cope PA-C, 11/14/19, 10:54 AM.

## 2019-11-15 ENCOUNTER — READMISSION MANAGEMENT (OUTPATIENT)
Dept: CALL CENTER | Facility: HOSPITAL | Age: 49
End: 2019-11-15

## 2019-11-15 NOTE — OUTREACH NOTE
Prep Survey      Responses   Facility patient discharged from?  [place]   Is patient eligible?  Yes   Discharge diagnosis  Perinephric abscess,  Gastroparesis due to secondary diabetes    Does the patient have one of the following disease processes/diagnoses(primary or secondary)?  Sepsis   Does the patient have Home health ordered?  Yes   What is the Home health agency?   [facility]   Is there a DME ordered?  Yes   What DME was ordered?  leatha life and bariatric BSC from [facility]   Prep survey completed?  Yes          Ashleigh Diamond RN

## 2019-11-18 ENCOUNTER — APPOINTMENT (OUTPATIENT)
Dept: GENERAL RADIOLOGY | Facility: HOSPITAL | Age: 49
End: 2019-11-18

## 2019-11-18 ENCOUNTER — HOSPITAL ENCOUNTER (INPATIENT)
Facility: HOSPITAL | Age: 49
LOS: 8 days | Discharge: NURSING, REHAB OR LONG-TERM CARE | End: 2019-11-26
Attending: EMERGENCY MEDICINE | Admitting: INTERNAL MEDICINE

## 2019-11-18 ENCOUNTER — TRANSITIONAL CARE MANAGEMENT TELEPHONE ENCOUNTER (OUTPATIENT)
Dept: INTERNAL MEDICINE | Facility: CLINIC | Age: 49
End: 2019-11-18

## 2019-11-18 ENCOUNTER — APPOINTMENT (OUTPATIENT)
Dept: CT IMAGING | Facility: HOSPITAL | Age: 49
End: 2019-11-18

## 2019-11-18 ENCOUNTER — OFFICE VISIT (OUTPATIENT)
Dept: FAMILY MEDICINE CLINIC | Facility: CLINIC | Age: 49
End: 2019-11-18

## 2019-11-18 ENCOUNTER — APPOINTMENT (OUTPATIENT)
Dept: ULTRASOUND IMAGING | Facility: HOSPITAL | Age: 49
End: 2019-11-18

## 2019-11-18 VITALS
HEIGHT: 75 IN | OXYGEN SATURATION: 96 % | SYSTOLIC BLOOD PRESSURE: 142 MMHG | HEART RATE: 103 BPM | BODY MASS INDEX: 33.94 KG/M2 | DIASTOLIC BLOOD PRESSURE: 80 MMHG

## 2019-11-18 DIAGNOSIS — N39.0 ACUTE URINARY TRACT INFECTION: ICD-10-CM

## 2019-11-18 DIAGNOSIS — N15.1 PERINEPHRIC ABSCESS: Primary | ICD-10-CM

## 2019-11-18 DIAGNOSIS — R09.02 HYPOXIA: Primary | ICD-10-CM

## 2019-11-18 DIAGNOSIS — I50.9 ACUTE ON CHRONIC CONGESTIVE HEART FAILURE, UNSPECIFIED HEART FAILURE TYPE (HCC): ICD-10-CM

## 2019-11-18 DIAGNOSIS — Z78.9 IMPAIRED MOBILITY AND ADLS: ICD-10-CM

## 2019-11-18 DIAGNOSIS — R77.8 ELEVATED TROPONIN: ICD-10-CM

## 2019-11-18 DIAGNOSIS — R34 OLIGURIA: ICD-10-CM

## 2019-11-18 DIAGNOSIS — Z74.09 IMPAIRED MOBILITY AND ADLS: ICD-10-CM

## 2019-11-18 PROBLEM — J96.01 ACUTE RESPIRATORY FAILURE WITH HYPOXIA (HCC): Status: ACTIVE | Noted: 2019-11-18

## 2019-11-18 PROBLEM — I50.21 SYSTOLIC CHF, ACUTE (HCC): Status: ACTIVE | Noted: 2019-11-18

## 2019-11-18 LAB
ALBUMIN SERPL-MCNC: 3.7 G/DL (ref 3.5–5.2)
ALBUMIN/GLOB SERPL: 0.9 G/DL
ALP SERPL-CCNC: 176 U/L (ref 39–117)
ALT SERPL W P-5'-P-CCNC: 17 U/L (ref 1–41)
ANION GAP SERPL CALCULATED.3IONS-SCNC: 14 MMOL/L (ref 5–15)
AST SERPL-CCNC: 17 U/L (ref 1–40)
BACTERIA UR QL AUTO: ABNORMAL /HPF
BASOPHILS # BLD AUTO: 0.12 10*3/MM3 (ref 0–0.2)
BASOPHILS NFR BLD AUTO: 1.1 % (ref 0–1.5)
BILIRUB SERPL-MCNC: 0.3 MG/DL (ref 0.2–1.2)
BILIRUB UR QL STRIP: NEGATIVE
BUN BLD-MCNC: 16 MG/DL (ref 6–20)
BUN/CREAT SERPL: 28.1 (ref 7–25)
CALCIUM SPEC-SCNC: 9.6 MG/DL (ref 8.6–10.5)
CHLORIDE SERPL-SCNC: 101 MMOL/L (ref 98–107)
CLARITY UR: ABNORMAL
CO2 SERPL-SCNC: 25 MMOL/L (ref 22–29)
COLOR UR: YELLOW
CREAT BLD-MCNC: 0.57 MG/DL (ref 0.76–1.27)
D-LACTATE SERPL-SCNC: 1.3 MMOL/L (ref 0.5–2)
DEPRECATED RDW RBC AUTO: 53.2 FL (ref 37–54)
EOSINOPHIL # BLD AUTO: 0.23 10*3/MM3 (ref 0–0.4)
EOSINOPHIL NFR BLD AUTO: 2 % (ref 0.3–6.2)
ERYTHROCYTE [DISTWIDTH] IN BLOOD BY AUTOMATED COUNT: 16.4 % (ref 12.3–15.4)
GFR SERPL CREATININE-BSD FRML MDRD: >150 ML/MIN/1.73
GLOBULIN UR ELPH-MCNC: 4.2 GM/DL
GLUCOSE BLD-MCNC: 205 MG/DL (ref 65–99)
GLUCOSE UR STRIP-MCNC: NEGATIVE MG/DL
HCT VFR BLD AUTO: 35.7 % (ref 37.5–51)
HGB BLD-MCNC: 10.7 G/DL (ref 13–17.7)
HGB UR QL STRIP.AUTO: ABNORMAL
HOLD SPECIMEN: NORMAL
HOLD SPECIMEN: NORMAL
HYALINE CASTS UR QL AUTO: ABNORMAL /LPF
IMM GRANULOCYTES # BLD AUTO: 0.04 10*3/MM3 (ref 0–0.05)
IMM GRANULOCYTES NFR BLD AUTO: 0.4 % (ref 0–0.5)
KETONES UR QL STRIP: ABNORMAL
LEUKOCYTE ESTERASE UR QL STRIP.AUTO: ABNORMAL
LYMPHOCYTES # BLD AUTO: 1.89 10*3/MM3 (ref 0.7–3.1)
LYMPHOCYTES NFR BLD AUTO: 16.8 % (ref 19.6–45.3)
MAGNESIUM SERPL-MCNC: 1.7 MG/DL (ref 1.6–2.6)
MCH RBC QN AUTO: 26.8 PG (ref 26.6–33)
MCHC RBC AUTO-ENTMCNC: 30 G/DL (ref 31.5–35.7)
MCV RBC AUTO: 89.5 FL (ref 79–97)
MONOCYTES # BLD AUTO: 0.67 10*3/MM3 (ref 0.1–0.9)
MONOCYTES NFR BLD AUTO: 6 % (ref 5–12)
NEUTROPHILS # BLD AUTO: 8.27 10*3/MM3 (ref 1.7–7)
NEUTROPHILS NFR BLD AUTO: 73.7 % (ref 42.7–76)
NITRITE UR QL STRIP: NEGATIVE
NRBC BLD AUTO-RTO: 0 /100 WBC (ref 0–0.2)
NT-PROBNP SERPL-MCNC: 3250 PG/ML (ref 5–900)
PH UR STRIP.AUTO: <=5 [PH] (ref 5–8)
PLATELET # BLD AUTO: 364 10*3/MM3 (ref 140–450)
PMV BLD AUTO: 11.2 FL (ref 6–12)
POTASSIUM BLD-SCNC: 3.8 MMOL/L (ref 3.5–5.2)
PROT SERPL-MCNC: 7.9 G/DL (ref 6–8.5)
PROT UR QL STRIP: ABNORMAL
RBC # BLD AUTO: 3.99 10*6/MM3 (ref 4.14–5.8)
RBC # UR: ABNORMAL /HPF
REF LAB TEST METHOD: ABNORMAL
SODIUM BLD-SCNC: 140 MMOL/L (ref 136–145)
SP GR UR STRIP: 1.02 (ref 1–1.03)
SQUAMOUS #/AREA URNS HPF: ABNORMAL /HPF
TROPONIN T SERPL-MCNC: 0.1 NG/ML (ref 0–0.03)
UROBILINOGEN UR QL STRIP: ABNORMAL
WBC NRBC COR # BLD: 11.22 10*3/MM3 (ref 3.4–10.8)
WBC UR QL AUTO: ABNORMAL /HPF
WHOLE BLOOD HOLD SPECIMEN: NORMAL
WHOLE BLOOD HOLD SPECIMEN: NORMAL
YEAST URNS QL MICRO: ABNORMAL /HPF

## 2019-11-18 PROCEDURE — 87186 SC STD MICRODIL/AGAR DIL: CPT | Performed by: FAMILY MEDICINE

## 2019-11-18 PROCEDURE — 93005 ELECTROCARDIOGRAM TRACING: CPT | Performed by: EMERGENCY MEDICINE

## 2019-11-18 PROCEDURE — 99495 TRANSJ CARE MGMT MOD F2F 14D: CPT | Performed by: FAMILY MEDICINE

## 2019-11-18 PROCEDURE — 36415 COLL VENOUS BLD VENIPUNCTURE: CPT

## 2019-11-18 PROCEDURE — 81001 URINALYSIS AUTO W/SCOPE: CPT | Performed by: EMERGENCY MEDICINE

## 2019-11-18 PROCEDURE — 71045 X-RAY EXAM CHEST 1 VIEW: CPT

## 2019-11-18 PROCEDURE — 85025 COMPLETE CBC W/AUTO DIFF WBC: CPT | Performed by: EMERGENCY MEDICINE

## 2019-11-18 PROCEDURE — 87040 BLOOD CULTURE FOR BACTERIA: CPT | Performed by: EMERGENCY MEDICINE

## 2019-11-18 PROCEDURE — 99284 EMERGENCY DEPT VISIT MOD MDM: CPT

## 2019-11-18 PROCEDURE — 63710000001 INSULIN LISPRO (HUMAN) PER 5 UNITS: Performed by: PHYSICIAN ASSISTANT

## 2019-11-18 PROCEDURE — 87077 CULTURE AEROBIC IDENTIFY: CPT | Performed by: FAMILY MEDICINE

## 2019-11-18 PROCEDURE — 84484 ASSAY OF TROPONIN QUANT: CPT | Performed by: EMERGENCY MEDICINE

## 2019-11-18 PROCEDURE — 74176 CT ABD & PELVIS W/O CONTRAST: CPT

## 2019-11-18 PROCEDURE — 25010000002 VANCOMYCIN 10 G RECONSTITUTED SOLUTION: Performed by: EMERGENCY MEDICINE

## 2019-11-18 PROCEDURE — 83605 ASSAY OF LACTIC ACID: CPT | Performed by: EMERGENCY MEDICINE

## 2019-11-18 PROCEDURE — 25010000002 PIPERACILLIN SOD-TAZOBACTAM PER 1 G: Performed by: EMERGENCY MEDICINE

## 2019-11-18 PROCEDURE — 76775 US EXAM ABDO BACK WALL LIM: CPT

## 2019-11-18 PROCEDURE — 87086 URINE CULTURE/COLONY COUNT: CPT | Performed by: FAMILY MEDICINE

## 2019-11-18 PROCEDURE — 83735 ASSAY OF MAGNESIUM: CPT | Performed by: EMERGENCY MEDICINE

## 2019-11-18 PROCEDURE — 80053 COMPREHEN METABOLIC PANEL: CPT | Performed by: EMERGENCY MEDICINE

## 2019-11-18 PROCEDURE — 83880 ASSAY OF NATRIURETIC PEPTIDE: CPT | Performed by: EMERGENCY MEDICINE

## 2019-11-18 PROCEDURE — 99223 1ST HOSP IP/OBS HIGH 75: CPT | Performed by: FAMILY MEDICINE

## 2019-11-18 RX ORDER — SODIUM CHLORIDE 0.9 % (FLUSH) 0.9 %
10 SYRINGE (ML) INJECTION EVERY 12 HOURS SCHEDULED
Status: DISCONTINUED | OUTPATIENT
Start: 2019-11-18 | End: 2019-11-26 | Stop reason: HOSPADM

## 2019-11-18 RX ORDER — NICOTINE POLACRILEX 4 MG
15 LOZENGE BUCCAL
Status: DISCONTINUED | OUTPATIENT
Start: 2019-11-18 | End: 2019-11-26 | Stop reason: HOSPADM

## 2019-11-18 RX ORDER — FUROSEMIDE 10 MG/ML
40 INJECTION INTRAMUSCULAR; INTRAVENOUS EVERY 12 HOURS
Status: DISCONTINUED | OUTPATIENT
Start: 2019-11-19 | End: 2019-11-22

## 2019-11-18 RX ORDER — ACETAMINOPHEN 325 MG/1
650 TABLET ORAL EVERY 4 HOURS PRN
Status: DISCONTINUED | OUTPATIENT
Start: 2019-11-18 | End: 2019-11-26 | Stop reason: HOSPADM

## 2019-11-18 RX ORDER — ONDANSETRON 2 MG/ML
4 INJECTION INTRAMUSCULAR; INTRAVENOUS EVERY 6 HOURS PRN
Status: DISCONTINUED | OUTPATIENT
Start: 2019-11-18 | End: 2019-11-26 | Stop reason: HOSPADM

## 2019-11-18 RX ORDER — ASPIRIN 81 MG/1
81 TABLET ORAL DAILY
Status: DISCONTINUED | OUTPATIENT
Start: 2019-11-19 | End: 2019-11-26 | Stop reason: HOSPADM

## 2019-11-18 RX ORDER — ACETAMINOPHEN 650 MG/1
650 SUPPOSITORY RECTAL EVERY 4 HOURS PRN
Status: DISCONTINUED | OUTPATIENT
Start: 2019-11-18 | End: 2019-11-26 | Stop reason: HOSPADM

## 2019-11-18 RX ORDER — CHOLECALCIFEROL (VITAMIN D3) 125 MCG
5 CAPSULE ORAL NIGHTLY PRN
Status: DISCONTINUED | OUTPATIENT
Start: 2019-11-18 | End: 2019-11-26 | Stop reason: HOSPADM

## 2019-11-18 RX ORDER — METOPROLOL TARTRATE 100 MG/1
100 TABLET ORAL EVERY 12 HOURS SCHEDULED
Status: DISCONTINUED | OUTPATIENT
Start: 2019-11-18 | End: 2019-11-26 | Stop reason: HOSPADM

## 2019-11-18 RX ORDER — HYDRALAZINE HYDROCHLORIDE 25 MG/1
25 TABLET, FILM COATED ORAL EVERY 8 HOURS SCHEDULED
Status: DISCONTINUED | OUTPATIENT
Start: 2019-11-18 | End: 2019-11-25

## 2019-11-18 RX ORDER — SODIUM CHLORIDE 0.9 % (FLUSH) 0.9 %
10 SYRINGE (ML) INJECTION AS NEEDED
Status: DISCONTINUED | OUTPATIENT
Start: 2019-11-18 | End: 2019-11-26 | Stop reason: HOSPADM

## 2019-11-18 RX ORDER — DEXTROSE MONOHYDRATE 25 G/50ML
25 INJECTION, SOLUTION INTRAVENOUS
Status: DISCONTINUED | OUTPATIENT
Start: 2019-11-18 | End: 2019-11-26 | Stop reason: HOSPADM

## 2019-11-18 RX ORDER — HEPARIN SODIUM 5000 [USP'U]/ML
5000 INJECTION, SOLUTION INTRAVENOUS; SUBCUTANEOUS EVERY 8 HOURS SCHEDULED
Status: DISCONTINUED | OUTPATIENT
Start: 2019-11-19 | End: 2019-11-26 | Stop reason: HOSPADM

## 2019-11-18 RX ORDER — ACETAMINOPHEN 160 MG/5ML
650 SOLUTION ORAL EVERY 4 HOURS PRN
Status: DISCONTINUED | OUTPATIENT
Start: 2019-11-18 | End: 2019-11-26 | Stop reason: HOSPADM

## 2019-11-18 RX ORDER — TERAZOSIN 5 MG/1
5 CAPSULE ORAL NIGHTLY
Status: DISCONTINUED | OUTPATIENT
Start: 2019-11-18 | End: 2019-11-26 | Stop reason: HOSPADM

## 2019-11-18 RX ORDER — DULOXETIN HYDROCHLORIDE 60 MG/1
60 CAPSULE, DELAYED RELEASE ORAL DAILY
Status: DISCONTINUED | OUTPATIENT
Start: 2019-11-19 | End: 2019-11-26 | Stop reason: HOSPADM

## 2019-11-18 RX ORDER — NYSTATIN 100000 U/G
CREAM TOPICAL 2 TIMES DAILY
Status: DISCONTINUED | OUTPATIENT
Start: 2019-11-18 | End: 2019-11-26 | Stop reason: HOSPADM

## 2019-11-18 RX ORDER — AMLODIPINE BESYLATE 10 MG/1
10 TABLET ORAL
Status: DISCONTINUED | OUTPATIENT
Start: 2019-11-19 | End: 2019-11-26 | Stop reason: HOSPADM

## 2019-11-18 RX ADMIN — VANCOMYCIN HYDROCHLORIDE 2500 MG: 10 INJECTION, POWDER, LYOPHILIZED, FOR SOLUTION INTRAVENOUS at 23:32

## 2019-11-18 RX ADMIN — TAZOBACTAM SODIUM AND PIPERACILLIN SODIUM 3.38 G: 375; 3 INJECTION, SOLUTION INTRAVENOUS at 20:15

## 2019-11-18 NOTE — OUTREACH NOTE
"TCM call completed.  See TCM flowsheet for details.  Does have upcoming hospital follow up appt with Dr. Stanley Carrasco today 11/18/19 at 4:00.  Wife answered phone and states he is asleep at present, but that \"he is not doing good.\" Starting to be SOA and has noticed that his urinary output has decreased. Had to call home health to come Saturday.  Offered an appointment with Dr. Carrasco for today, which she states \"I will get him there.\"  "

## 2019-11-18 NOTE — PROGRESS NOTES
Transitional Care Follow Up Visit  Subjective     Kendrick Crystal is a 49 y.o. male who presents for a transitional care management visit.    Within 48 business hours after discharge our office contacted him via telephone to coordinate his care and needs. His mother contacted home health and [facility] to state that he feels worse than he did before his most recent hospitalization, and that they feel he needs to go back to the hospital. He was instructed to come to his TCM visit which was moved up a day to this afternoon to establish with a new primary care physician before going to the ER.     I reviewed and discussed the details of that call along with the discharge summary, hospital problems, inpatient lab results, inpatient diagnostic studies, and consultation reports with Kendrick.    Patient Care Team:  Provider, No Known as PCP - General     Current outpatient and discharge medications have been reconciled for the patient.  Reviewed by: Stanley Cararsco DO      Date of TCM Phone Call 8/15/2019 8/26/2019 11/18/2019   [facility]   Date of Admission 7/30/2019 8/17/2019 11/26/2019   Date of Discharge 8/14/2019 8/23/2019 11/14/2019   Discharge Disposition Home or Self Care Home or Self Care Home or Self Care     Risk for Readmission (LACE) Score: 19 (11/14/2019  6:00 AM)      History of Present Illness   Course During Hospital Stay:    Mr. Kendrick Crystal is a 49yoM with PMH significant for poorly-controlled insulin dependent DMII with diabetic peripheral neuropathy and gastroparesis, HTN, PAD, prior diabetic foot ulcers and L BKA, DUNLAP cirrhosis, prior C. Diff colitis, bipolar disorder and medical noncompliance.      - Admitted to [facility] 4/24-5/3/2019 for abdominal wall abscess. He underwent debridement by Dr. Kern and discharged to [facility] on IV Zosyn/Daptomycin per [facility] recommendations.   - Admitted 5/18-5/28/19 for abdominal wall cellulitis and anasarca associated to DUNLAP  cirrhosis. He was encouraged to return to [facility] but he discharged home with home health on PO Doxycycline.   - Admitted 5/30-6/2/2019 with intractable nausea/vomiting that resolved with discontinuation of Doxycycline. He discharged home off of antibiotics. He discharged home.   - Admitted 7/30-8/14/2019 with rectal pain and concerns for rectal abscess. Dr. Payne of [facility] performed trans-rectal drainage of a claudia-rectal abscess on 8/2/19. He developed JUAN and was followed by nephrology. He refused rehab placement and discharged home on PO Flagyn/Ceftin.   - Admitted 8/17-8/23/2019 with nausea/vomiting and inability to keep PO antibiotics down. CT A/P showed increased fluid collection density, no intervention was performed by [facility] (Elmer and Sveticbahman both evaluated). He treated with Zosyn and Micafungin (candiduria) and discharged on PO Omnicef and topical antifungal for groin candidiasis. He discharged home.   Admitted 10/8-10/23/19 for sepsis secondary to klebsiella perinephric abscess, E. Coli UTI and Klebsiella bacteremia. He discharged home on Invanz 1g IV daily and PO Vancomycin for C. Diff ppx.      He returned to [facility] ED on 10/26/2019 with complaints of increasing weakness/fatigue/malaise and diarrhea. He reported that he developed diarrhea after eating chili for dinner the night prior to presentation. Wife also admitted to missing administration of 2 of 3 days of Invanz while at home.      CT abdomen/pelvis revealed a perinephric or sub-scapular abscess along the posterior aspect of the lower pole of the R kidney, enlarge from last scan on 10/18/19. WBCs 12.45. ID was consulted from the ED. Dr. Dong recommended transition to Meropenem. He was admitted to the hospital medicine service and treated with IV fluid and IV antibiotics. He received C. Diff ppx with PO Vancomycin.      Urology was consulted. Dr. Gutierres recommended CT-guided drain placement. This was performed on 10/29 with  "drainage of 12mL of fluid. Culture returned with Klebsiella. Urology did not recommend operative intervention. Antibiotics transitioned to IV Zosyn. He was treated for candiduria with IV Diflucan.      Drain was inadvertently removed. Due to pneumaturia, a VCUG was performed on 11/8 - no extravasation of contrast was identified to indicate presence of a fistula.      Mr. Crystal has completed 2 weeks of IV antibiotics following drain placement.      PT/OT followed the patient throughout his hospitalization. He was not compliant with working with therapy and [facility] declined his admission referral. He refused referrals to any other facilities in the [facility] or other surrounding [facility] and continues to refuse to work with therapy. Despite his high-risk for readmission, Mr. Crystal has met maximum medical benefit from this hospitalization and he is medically stable. He has been cleared for discharge by urology and infectious disease. He was discharged home on 11/14/2019.      Since his discharge he has rapidly declined. He has no energy, has been sleeping and obtunded most of the day. He has severe orthopnea and shortness of air. He was initially putting out an adequate volume of urine through Friday, but over the weekend this has declined to about 100cc/24hour estimated by patient and mother. He has felt feverish with chills, but no objective fever at home.     The following portions of the patient's history were reviewed and updated as appropriate: allergies, current medications, past family history, past medical history, past social history, past surgical history and problem list.    Review of Systems   Constitutional: Positive for fatigue.   Respiratory: Positive for shortness of breath.    Cardiovascular: Negative for chest pain.       Objective   Blood pressure 142/80, pulse 103, height 190.5 cm (75\"), SpO2 96 %.  Nursing note reviewed  Physical Exam  Const: Toxic appearing but alert and " oriented  Eyes: EOMI, no conjunctivitis  ENT: No nasal discharge present, neck supple  Cardiac: Regular rate and rhythm, no cyanosis  Resp: Respiratory rate within normal limits, mildly increased work of breathing, no audible wheezing or retractions noted  GI: Severe distension noted  MSK: S/P left BKA  Assessment/Plan     Kendrick was seen today for establish care.    Diagnoses and all orders for this visit:    Perinephric abscess    Oliguria      Patient has declined since discharge and requires evaluation of his kidney function and worsening mental status. He has no confusion today but has been severely obtunded at home. His orthopnea and SOA are of concern as well although he is oxygenating well on RA in clinic today. Of note, PT/OT followed the patient throughout his last hospitalization. He was not compliant with working with therapy and [facility] declined his admission referral. He refused referrals to any other facilities in the [facility] or other surrounding counties and continued to refuse to work with therapy.    Report called to the ER at 1645. Patient will attend to ER by POV.     There are no Patient Instructions on file for this visit.

## 2019-11-19 ENCOUNTER — READMISSION MANAGEMENT (OUTPATIENT)
Dept: CALL CENTER | Facility: HOSPITAL | Age: 49
End: 2019-11-19

## 2019-11-19 ENCOUNTER — APPOINTMENT (OUTPATIENT)
Dept: CARDIOLOGY | Facility: HOSPITAL | Age: 49
End: 2019-11-19

## 2019-11-19 PROBLEM — Z91.199 MEDICAL NON-COMPLIANCE: Status: ACTIVE | Noted: 2019-11-19

## 2019-11-19 PROBLEM — I50.33 ACUTE ON CHRONIC DIASTOLIC CONGESTIVE HEART FAILURE (HCC): Status: ACTIVE | Noted: 2019-11-18

## 2019-11-19 PROBLEM — R46.89 SELF NEGLECT: Status: ACTIVE | Noted: 2019-11-19

## 2019-11-19 LAB
ALBUMIN SERPL-MCNC: 3.1 G/DL (ref 3.5–5.2)
ALBUMIN/GLOB SERPL: 0.8 G/DL
ALP SERPL-CCNC: 155 U/L (ref 39–117)
ALT SERPL W P-5'-P-CCNC: 14 U/L (ref 1–41)
ANION GAP SERPL CALCULATED.3IONS-SCNC: 13 MMOL/L (ref 5–15)
AST SERPL-CCNC: 17 U/L (ref 1–40)
B PARAPERT DNA SPEC QL NAA+PROBE: NOT DETECTED
B PERT DNA SPEC QL NAA+PROBE: NOT DETECTED
BASOPHILS # BLD AUTO: 0.11 10*3/MM3 (ref 0–0.2)
BASOPHILS NFR BLD AUTO: 1 % (ref 0–1.5)
BH CV ECHO MEAS - AO MAX PG (FULL): 5.5 MMHG
BH CV ECHO MEAS - AO MAX PG: 7.7 MMHG
BH CV ECHO MEAS - AO ROOT AREA (BSA CORRECTED): 1.5
BH CV ECHO MEAS - AO ROOT AREA: 9.8 CM^2
BH CV ECHO MEAS - AO ROOT DIAM: 3.5 CM
BH CV ECHO MEAS - AO V2 MAX: 139 CM/SEC
BH CV ECHO MEAS - AVA(V,A): 1.8 CM^2
BH CV ECHO MEAS - AVA(V,D): 1.8 CM^2
BH CV ECHO MEAS - BSA(HAYCOCK): 2.4 M^2
BH CV ECHO MEAS - BSA: 2.4 M^2
BH CV ECHO MEAS - BZI_BMI: 30.6 KILOGRAMS/M^2
BH CV ECHO MEAS - BZI_METRIC_HEIGHT: 190.5 CM
BH CV ECHO MEAS - BZI_METRIC_WEIGHT: 111.1 KG
BH CV ECHO MEAS - EDV(CUBED): 87.1 ML
BH CV ECHO MEAS - EDV(MOD-SP2): 99 ML
BH CV ECHO MEAS - EDV(MOD-SP4): 148 ML
BH CV ECHO MEAS - EDV(TEICH): 89.3 ML
BH CV ECHO MEAS - EF(CUBED): 53.1 %
BH CV ECHO MEAS - EF(MOD-BP): 47 %
BH CV ECHO MEAS - EF(MOD-SP2): 27.3 %
BH CV ECHO MEAS - EF(MOD-SP4): 57.4 %
BH CV ECHO MEAS - EF(TEICH): 45.2 %
BH CV ECHO MEAS - ESV(CUBED): 40.8 ML
BH CV ECHO MEAS - ESV(MOD-SP2): 72 ML
BH CV ECHO MEAS - ESV(MOD-SP4): 63 ML
BH CV ECHO MEAS - ESV(TEICH): 48.9 ML
BH CV ECHO MEAS - FS: 22.3 %
BH CV ECHO MEAS - IVS/LVPW: 1.1
BH CV ECHO MEAS - IVSD: 1.1 CM
BH CV ECHO MEAS - LA DIMENSION: 4.5 CM
BH CV ECHO MEAS - LA/AO: 1.3
BH CV ECHO MEAS - LAD MAJOR: 5.3 CM
BH CV ECHO MEAS - LAT PEAK E' VEL: 5.2 CM/SEC
BH CV ECHO MEAS - LATERAL E/E' RATIO: 25.5
BH CV ECHO MEAS - LV DIASTOLIC VOL/BSA (35-75): 61.9 ML/M^2
BH CV ECHO MEAS - LV MASS(C)D: 164.7 GRAMS
BH CV ECHO MEAS - LV MASS(C)DI: 68.8 GRAMS/M^2
BH CV ECHO MEAS - LV MAX PG: 2.2 MMHG
BH CV ECHO MEAS - LV MEAN PG: 1.1 MMHG
BH CV ECHO MEAS - LV SYSTOLIC VOL/BSA (12-30): 26.3 ML/M^2
BH CV ECHO MEAS - LV V1 MAX: 74 CM/SEC
BH CV ECHO MEAS - LV V1 MEAN: 47.8 CM/SEC
BH CV ECHO MEAS - LV V1 VTI: 16.7 CM
BH CV ECHO MEAS - LVIDD: 4.4 CM
BH CV ECHO MEAS - LVIDS: 3.4 CM
BH CV ECHO MEAS - LVLD AP2: 9.1 CM
BH CV ECHO MEAS - LVLD AP4: 10.4 CM
BH CV ECHO MEAS - LVLS AP2: 8.6 CM
BH CV ECHO MEAS - LVLS AP4: 8.9 CM
BH CV ECHO MEAS - LVOT AREA (M): 3.5 CM^2
BH CV ECHO MEAS - LVOT AREA: 3.3 CM^2
BH CV ECHO MEAS - LVOT DIAM: 2.1 CM
BH CV ECHO MEAS - LVPWD: 1 CM
BH CV ECHO MEAS - MED PEAK E' VEL: 7.7 CM/SEC
BH CV ECHO MEAS - MEDIAL E/E' RATIO: 17.2
BH CV ECHO MEAS - MV A MAX VEL: 77.5 CM/SEC
BH CV ECHO MEAS - MV DEC TIME: 0.19 SEC
BH CV ECHO MEAS - MV E MAX VEL: 134.3 CM/SEC
BH CV ECHO MEAS - MV E/A: 1.7
BH CV ECHO MEAS - PA ACC SLOPE: 447.8 CM/SEC^2
BH CV ECHO MEAS - PA ACC TIME: 0.13 SEC
BH CV ECHO MEAS - PA PR(ACCEL): 22 MMHG
BH CV ECHO MEAS - SI(CUBED): 19.4 ML/M^2
BH CV ECHO MEAS - SI(LVOT): 23.3 ML/M^2
BH CV ECHO MEAS - SI(MOD-SP2): 11.3 ML/M^2
BH CV ECHO MEAS - SI(MOD-SP4): 35.5 ML/M^2
BH CV ECHO MEAS - SI(TEICH): 16.9 ML/M^2
BH CV ECHO MEAS - SV(CUBED): 46.3 ML
BH CV ECHO MEAS - SV(LVOT): 55.8 ML
BH CV ECHO MEAS - SV(MOD-SP2): 27 ML
BH CV ECHO MEAS - SV(MOD-SP4): 85 ML
BH CV ECHO MEAS - SV(TEICH): 40.3 ML
BH CV ECHO MEAS - TAPSE (>1.6): 2.3 CM2
BH CV ECHO MEASUREMENTS AVERAGE E/E' RATIO: 20.82
BH CV VAS BP RIGHT ARM: NORMAL MMHG
BH CV XLRA - RV BASE: 3.1 CM
BH CV XLRA - RV LENGTH: 6.8 CM
BH CV XLRA - RV MID: 1.7 CM
BH CV XLRA - TDI S': 12.1 CM/SEC
BILIRUB SERPL-MCNC: 0.3 MG/DL (ref 0.2–1.2)
BUN BLD-MCNC: 14 MG/DL (ref 6–20)
BUN/CREAT SERPL: 23.3 (ref 7–25)
C PNEUM DNA NPH QL NAA+NON-PROBE: NOT DETECTED
CALCIUM SPEC-SCNC: 8.7 MG/DL (ref 8.6–10.5)
CHLORIDE SERPL-SCNC: 103 MMOL/L (ref 98–107)
CO2 SERPL-SCNC: 24 MMOL/L (ref 22–29)
CREAT BLD-MCNC: 0.6 MG/DL (ref 0.76–1.27)
D-LACTATE SERPL-SCNC: 0.9 MMOL/L (ref 0.5–2)
DEPRECATED RDW RBC AUTO: 53.2 FL (ref 37–54)
EOSINOPHIL # BLD AUTO: 0.37 10*3/MM3 (ref 0–0.4)
EOSINOPHIL NFR BLD AUTO: 3.5 % (ref 0.3–6.2)
ERYTHROCYTE [DISTWIDTH] IN BLOOD BY AUTOMATED COUNT: 16.5 % (ref 12.3–15.4)
FLUAV H1 2009 PAND RNA NPH QL NAA+PROBE: NOT DETECTED
FLUAV H1 HA GENE NPH QL NAA+PROBE: NOT DETECTED
FLUAV H3 RNA NPH QL NAA+PROBE: NOT DETECTED
FLUAV SUBTYP SPEC NAA+PROBE: NOT DETECTED
FLUBV RNA ISLT QL NAA+PROBE: NOT DETECTED
GFR SERPL CREATININE-BSD FRML MDRD: 142 ML/MIN/1.73
GLOBULIN UR ELPH-MCNC: 3.7 GM/DL
GLUCOSE BLD-MCNC: 196 MG/DL (ref 65–99)
GLUCOSE BLDC GLUCOMTR-MCNC: 128 MG/DL (ref 70–130)
GLUCOSE BLDC GLUCOMTR-MCNC: 156 MG/DL (ref 70–130)
GLUCOSE BLDC GLUCOMTR-MCNC: 171 MG/DL (ref 70–130)
GLUCOSE BLDC GLUCOMTR-MCNC: 185 MG/DL (ref 70–130)
GLUCOSE BLDC GLUCOMTR-MCNC: 228 MG/DL (ref 70–130)
HADV DNA SPEC NAA+PROBE: NOT DETECTED
HCOV 229E RNA SPEC QL NAA+PROBE: NOT DETECTED
HCOV HKU1 RNA SPEC QL NAA+PROBE: NOT DETECTED
HCOV NL63 RNA SPEC QL NAA+PROBE: NOT DETECTED
HCOV OC43 RNA SPEC QL NAA+PROBE: NOT DETECTED
HCT VFR BLD AUTO: 33.1 % (ref 37.5–51)
HGB BLD-MCNC: 9.9 G/DL (ref 13–17.7)
HMPV RNA NPH QL NAA+NON-PROBE: NOT DETECTED
HPIV1 RNA SPEC QL NAA+PROBE: NOT DETECTED
HPIV2 RNA SPEC QL NAA+PROBE: NOT DETECTED
HPIV3 RNA NPH QL NAA+PROBE: NOT DETECTED
HPIV4 P GENE NPH QL NAA+PROBE: NOT DETECTED
IMM GRANULOCYTES # BLD AUTO: 0.03 10*3/MM3 (ref 0–0.05)
IMM GRANULOCYTES NFR BLD AUTO: 0.3 % (ref 0–0.5)
L PNEUMO1 AG UR QL IA: NEGATIVE
LV EF 2D ECHO EST: 45 %
LYMPHOCYTES # BLD AUTO: 2.5 10*3/MM3 (ref 0.7–3.1)
LYMPHOCYTES NFR BLD AUTO: 23.7 % (ref 19.6–45.3)
M PNEUMO IGG SER IA-ACNC: NOT DETECTED
MAGNESIUM SERPL-MCNC: 1.5 MG/DL (ref 1.6–2.6)
MAXIMAL PREDICTED HEART RATE: 169 BPM
MCH RBC QN AUTO: 27 PG (ref 26.6–33)
MCHC RBC AUTO-ENTMCNC: 29.9 G/DL (ref 31.5–35.7)
MCV RBC AUTO: 90.2 FL (ref 79–97)
MONOCYTES # BLD AUTO: 0.82 10*3/MM3 (ref 0.1–0.9)
MONOCYTES NFR BLD AUTO: 7.8 % (ref 5–12)
MRSA DNA SPEC QL NAA+PROBE: POSITIVE
NEUTROPHILS # BLD AUTO: 6.7 10*3/MM3 (ref 1.7–7)
NEUTROPHILS NFR BLD AUTO: 63.7 % (ref 42.7–76)
NRBC BLD AUTO-RTO: 0 /100 WBC (ref 0–0.2)
NT-PROBNP SERPL-MCNC: 3892 PG/ML (ref 5–900)
PLATELET # BLD AUTO: 322 10*3/MM3 (ref 140–450)
PMV BLD AUTO: 11 FL (ref 6–12)
POTASSIUM BLD-SCNC: 3.5 MMOL/L (ref 3.5–5.2)
PROCALCITONIN SERPL-MCNC: 0.09 NG/ML (ref 0.1–0.25)
PROT SERPL-MCNC: 6.8 G/DL (ref 6–8.5)
RBC # BLD AUTO: 3.67 10*6/MM3 (ref 4.14–5.8)
RHINOVIRUS RNA SPEC NAA+PROBE: NOT DETECTED
RSV RNA NPH QL NAA+NON-PROBE: NOT DETECTED
S PNEUM AG SPEC QL LA: NEGATIVE
SODIUM BLD-SCNC: 140 MMOL/L (ref 136–145)
STRESS TARGET HR: 144 BPM
TROPONIN T SERPL-MCNC: 0.08 NG/ML (ref 0–0.03)
WBC NRBC COR # BLD: 10.53 10*3/MM3 (ref 3.4–10.8)

## 2019-11-19 PROCEDURE — 82962 GLUCOSE BLOOD TEST: CPT

## 2019-11-19 PROCEDURE — 25010000002 MAGNESIUM SULFATE 2 GM/50ML SOLUTION: Performed by: FAMILY MEDICINE

## 2019-11-19 PROCEDURE — 25010000002 FUROSEMIDE PER 20 MG: Performed by: PHYSICIAN ASSISTANT

## 2019-11-19 PROCEDURE — 87641 MR-STAPH DNA AMP PROBE: CPT | Performed by: INTERNAL MEDICINE

## 2019-11-19 PROCEDURE — 97165 OT EVAL LOW COMPLEX 30 MIN: CPT

## 2019-11-19 PROCEDURE — 87899 AGENT NOS ASSAY W/OPTIC: CPT | Performed by: INTERNAL MEDICINE

## 2019-11-19 PROCEDURE — 0100U HC BIOFIRE FILMARRAY RESP PANEL 2: CPT | Performed by: FAMILY MEDICINE

## 2019-11-19 PROCEDURE — C1751 CATH, INF, PER/CENT/MIDLINE: HCPCS

## 2019-11-19 PROCEDURE — 83605 ASSAY OF LACTIC ACID: CPT | Performed by: PHYSICIAN ASSISTANT

## 2019-11-19 PROCEDURE — C1894 INTRO/SHEATH, NON-LASER: HCPCS

## 2019-11-19 PROCEDURE — 25010000002 PIPERACILLIN SOD-TAZOBACTAM PER 1 G: Performed by: PHYSICIAN ASSISTANT

## 2019-11-19 PROCEDURE — 25010000002 CEFTAROLINE FOSAMIL PER 10 MG: Performed by: INTERNAL MEDICINE

## 2019-11-19 PROCEDURE — 02HV33Z INSERTION OF INFUSION DEVICE INTO SUPERIOR VENA CAVA, PERCUTANEOUS APPROACH: ICD-10-PCS | Performed by: FAMILY MEDICINE

## 2019-11-19 PROCEDURE — 84484 ASSAY OF TROPONIN QUANT: CPT | Performed by: PHYSICIAN ASSISTANT

## 2019-11-19 PROCEDURE — 25010000002 FLUCONAZOLE PER 200 MG: Performed by: INTERNAL MEDICINE

## 2019-11-19 PROCEDURE — 93306 TTE W/DOPPLER COMPLETE: CPT | Performed by: INTERNAL MEDICINE

## 2019-11-19 PROCEDURE — 93306 TTE W/DOPPLER COMPLETE: CPT

## 2019-11-19 PROCEDURE — 83735 ASSAY OF MAGNESIUM: CPT | Performed by: PHYSICIAN ASSISTANT

## 2019-11-19 PROCEDURE — 80053 COMPREHEN METABOLIC PANEL: CPT | Performed by: PHYSICIAN ASSISTANT

## 2019-11-19 PROCEDURE — 99222 1ST HOSP IP/OBS MODERATE 55: CPT | Performed by: INTERNAL MEDICINE

## 2019-11-19 PROCEDURE — 84145 PROCALCITONIN (PCT): CPT | Performed by: PHYSICIAN ASSISTANT

## 2019-11-19 PROCEDURE — 83880 ASSAY OF NATRIURETIC PEPTIDE: CPT | Performed by: PHYSICIAN ASSISTANT

## 2019-11-19 PROCEDURE — 25010000002 HEPARIN (PORCINE) PER 1000 UNITS: Performed by: PHYSICIAN ASSISTANT

## 2019-11-19 PROCEDURE — 99233 SBSQ HOSP IP/OBS HIGH 50: CPT | Performed by: FAMILY MEDICINE

## 2019-11-19 PROCEDURE — 85025 COMPLETE CBC W/AUTO DIFF WBC: CPT | Performed by: PHYSICIAN ASSISTANT

## 2019-11-19 RX ORDER — MAGNESIUM SULFATE HEPTAHYDRATE 40 MG/ML
2 INJECTION, SOLUTION INTRAVENOUS AS NEEDED
Status: DISCONTINUED | OUTPATIENT
Start: 2019-11-19 | End: 2019-11-26 | Stop reason: HOSPADM

## 2019-11-19 RX ORDER — SODIUM CHLORIDE 0.9 % (FLUSH) 0.9 %
10 SYRINGE (ML) INJECTION AS NEEDED
Status: DISCONTINUED | OUTPATIENT
Start: 2019-11-19 | End: 2019-11-26 | Stop reason: HOSPADM

## 2019-11-19 RX ORDER — POTASSIUM CHLORIDE 7.45 MG/ML
10 INJECTION INTRAVENOUS
Status: DISCONTINUED | OUTPATIENT
Start: 2019-11-19 | End: 2019-11-26 | Stop reason: HOSPADM

## 2019-11-19 RX ORDER — SODIUM CHLORIDE 0.9 % (FLUSH) 0.9 %
10 SYRINGE (ML) INJECTION EVERY 12 HOURS SCHEDULED
Status: DISCONTINUED | OUTPATIENT
Start: 2019-11-19 | End: 2019-11-26 | Stop reason: HOSPADM

## 2019-11-19 RX ORDER — POTASSIUM CHLORIDE 1.5 G/1.77G
40 POWDER, FOR SOLUTION ORAL AS NEEDED
Status: DISCONTINUED | OUTPATIENT
Start: 2019-11-19 | End: 2019-11-26 | Stop reason: HOSPADM

## 2019-11-19 RX ORDER — MAGNESIUM SULFATE HEPTAHYDRATE 40 MG/ML
4 INJECTION, SOLUTION INTRAVENOUS AS NEEDED
Status: DISCONTINUED | OUTPATIENT
Start: 2019-11-19 | End: 2019-11-26 | Stop reason: HOSPADM

## 2019-11-19 RX ORDER — FLUCONAZOLE 2 MG/ML
200 INJECTION, SOLUTION INTRAVENOUS ONCE
Status: COMPLETED | OUTPATIENT
Start: 2019-11-19 | End: 2019-11-19

## 2019-11-19 RX ORDER — POTASSIUM CHLORIDE 750 MG/1
40 CAPSULE, EXTENDED RELEASE ORAL AS NEEDED
Status: DISCONTINUED | OUTPATIENT
Start: 2019-11-19 | End: 2019-11-26 | Stop reason: HOSPADM

## 2019-11-19 RX ADMIN — METOPROLOL TARTRATE 100 MG: 100 TABLET ORAL at 21:08

## 2019-11-19 RX ADMIN — FUROSEMIDE 40 MG: 10 INJECTION, SOLUTION INTRAMUSCULAR; INTRAVENOUS at 03:58

## 2019-11-19 RX ADMIN — SODIUM CHLORIDE, PRESERVATIVE FREE 10 ML: 5 INJECTION INTRAVENOUS at 14:46

## 2019-11-19 RX ADMIN — SODIUM CHLORIDE, PRESERVATIVE FREE 10 ML: 5 INJECTION INTRAVENOUS at 08:47

## 2019-11-19 RX ADMIN — NYSTATIN: 100000 CREAM TOPICAL at 11:23

## 2019-11-19 RX ADMIN — INSULIN LISPRO 2 UNITS: 100 INJECTION, SOLUTION INTRAVENOUS; SUBCUTANEOUS at 13:05

## 2019-11-19 RX ADMIN — MELATONIN TAB 5 MG 5 MG: 5 TAB at 21:08

## 2019-11-19 RX ADMIN — FLUCONAZOLE 200 MG: 200 INJECTION, SOLUTION INTRAVENOUS at 11:23

## 2019-11-19 RX ADMIN — METOPROLOL TARTRATE 100 MG: 100 TABLET ORAL at 00:14

## 2019-11-19 RX ADMIN — DOXYCYCLINE 100 MG: 100 INJECTION, POWDER, LYOPHILIZED, FOR SOLUTION INTRAVENOUS at 08:49

## 2019-11-19 RX ADMIN — TERAZOSIN HYDROCHLORIDE ANHYDROUS 5 MG: 5 CAPSULE ORAL at 01:00

## 2019-11-19 RX ADMIN — HYDRALAZINE HYDROCHLORIDE 25 MG: 25 TABLET, FILM COATED ORAL at 14:26

## 2019-11-19 RX ADMIN — HYDRALAZINE HYDROCHLORIDE 25 MG: 25 TABLET, FILM COATED ORAL at 21:08

## 2019-11-19 RX ADMIN — HYDRALAZINE HYDROCHLORIDE 25 MG: 25 TABLET, FILM COATED ORAL at 05:43

## 2019-11-19 RX ADMIN — POTASSIUM CHLORIDE 40 MEQ: 750 CAPSULE, EXTENDED RELEASE ORAL at 23:03

## 2019-11-19 RX ADMIN — DOXYCYCLINE 100 MG: 100 INJECTION, POWDER, LYOPHILIZED, FOR SOLUTION INTRAVENOUS at 21:11

## 2019-11-19 RX ADMIN — INSULIN LISPRO 2 UNITS: 100 INJECTION, SOLUTION INTRAVENOUS; SUBCUTANEOUS at 18:00

## 2019-11-19 RX ADMIN — MAGNESIUM SULFATE HEPTAHYDRATE 2 G: 40 INJECTION, SOLUTION INTRAVENOUS at 23:03

## 2019-11-19 RX ADMIN — TERAZOSIN HYDROCHLORIDE ANHYDROUS 5 MG: 5 CAPSULE ORAL at 21:08

## 2019-11-19 RX ADMIN — INSULIN LISPRO 3 UNITS: 100 INJECTION, SOLUTION INTRAVENOUS; SUBCUTANEOUS at 08:46

## 2019-11-19 RX ADMIN — METOPROLOL TARTRATE 100 MG: 100 TABLET ORAL at 08:46

## 2019-11-19 RX ADMIN — HEPARIN SODIUM 5000 UNITS: 5000 INJECTION INTRAVENOUS; SUBCUTANEOUS at 05:43

## 2019-11-19 RX ADMIN — HEPARIN SODIUM 5000 UNITS: 5000 INJECTION INTRAVENOUS; SUBCUTANEOUS at 14:26

## 2019-11-19 RX ADMIN — METRONIDAZOLE 500 MG: 500 INJECTION, SOLUTION INTRAVENOUS at 16:53

## 2019-11-19 RX ADMIN — TAZOBACTAM SODIUM AND PIPERACILLIN SODIUM 3.38 G: 375; 3 INJECTION, SOLUTION INTRAVENOUS at 03:58

## 2019-11-19 RX ADMIN — TAZOBACTAM SODIUM AND PIPERACILLIN SODIUM 3.38 G: 375; 3 INJECTION, SOLUTION INTRAVENOUS at 13:05

## 2019-11-19 RX ADMIN — HEPARIN SODIUM 5000 UNITS: 5000 INJECTION INTRAVENOUS; SUBCUTANEOUS at 21:08

## 2019-11-19 RX ADMIN — CEFTAROLINE FOSAMIL 600 MG: 600 POWDER, FOR SOLUTION INTRAVENOUS at 18:00

## 2019-11-19 RX ADMIN — FUROSEMIDE 40 MG: 10 INJECTION, SOLUTION INTRAMUSCULAR; INTRAVENOUS at 18:00

## 2019-11-19 RX ADMIN — METRONIDAZOLE 500 MG: 500 INJECTION, SOLUTION INTRAVENOUS at 23:02

## 2019-11-19 RX ADMIN — DULOXETINE HYDROCHLORIDE 60 MG: 60 CAPSULE, DELAYED RELEASE ORAL at 08:46

## 2019-11-19 RX ADMIN — ACETAMINOPHEN 650 MG: 325 TABLET ORAL at 23:03

## 2019-11-19 RX ADMIN — ASPIRIN 81 MG: 81 TABLET, COATED ORAL at 08:46

## 2019-11-19 RX ADMIN — INSULIN LISPRO 2 UNITS: 100 INJECTION, SOLUTION INTRAVENOUS; SUBCUTANEOUS at 21:07

## 2019-11-19 RX ADMIN — AMLODIPINE BESYLATE 10 MG: 10 TABLET ORAL at 08:46

## 2019-11-19 RX ADMIN — NYSTATIN: 100000 CREAM TOPICAL at 05:03

## 2019-11-19 RX ADMIN — HYDRALAZINE HYDROCHLORIDE 25 MG: 25 TABLET, FILM COATED ORAL at 00:14

## 2019-11-20 LAB
ALBUMIN SERPL-MCNC: 2.8 G/DL (ref 3.5–5.2)
ALBUMIN/GLOB SERPL: 0.8 G/DL
ALP SERPL-CCNC: 140 U/L (ref 39–117)
ALT SERPL W P-5'-P-CCNC: 12 U/L (ref 1–41)
ANION GAP SERPL CALCULATED.3IONS-SCNC: 14 MMOL/L (ref 5–15)
AST SERPL-CCNC: 14 U/L (ref 1–40)
BILIRUB SERPL-MCNC: 0.3 MG/DL (ref 0.2–1.2)
BUN BLD-MCNC: 21 MG/DL (ref 6–20)
BUN/CREAT SERPL: 20.6 (ref 7–25)
CALCIUM SPEC-SCNC: 8.6 MG/DL (ref 8.6–10.5)
CHLORIDE SERPL-SCNC: 102 MMOL/L (ref 98–107)
CO2 SERPL-SCNC: 23 MMOL/L (ref 22–29)
CREAT BLD-MCNC: 1.02 MG/DL (ref 0.76–1.27)
DEPRECATED RDW RBC AUTO: 53.5 FL (ref 37–54)
ERYTHROCYTE [DISTWIDTH] IN BLOOD BY AUTOMATED COUNT: 16.2 % (ref 12.3–15.4)
FUNGUS WND CULT: NORMAL
GFR SERPL CREATININE-BSD FRML MDRD: 77 ML/MIN/1.73
GLOBULIN UR ELPH-MCNC: 3.4 GM/DL
GLUCOSE BLD-MCNC: 151 MG/DL (ref 65–99)
GLUCOSE BLDC GLUCOMTR-MCNC: 162 MG/DL (ref 70–130)
GLUCOSE BLDC GLUCOMTR-MCNC: 164 MG/DL (ref 70–130)
GLUCOSE BLDC GLUCOMTR-MCNC: 178 MG/DL (ref 70–130)
GLUCOSE BLDC GLUCOMTR-MCNC: 180 MG/DL (ref 70–130)
HCT VFR BLD AUTO: 30.6 % (ref 37.5–51)
HGB BLD-MCNC: 9.1 G/DL (ref 13–17.7)
MAGNESIUM SERPL-MCNC: 2.6 MG/DL (ref 1.6–2.6)
MCH RBC QN AUTO: 27.1 PG (ref 26.6–33)
MCHC RBC AUTO-ENTMCNC: 29.7 G/DL (ref 31.5–35.7)
MCV RBC AUTO: 91.1 FL (ref 79–97)
PLATELET # BLD AUTO: 248 10*3/MM3 (ref 140–450)
PMV BLD AUTO: 11.4 FL (ref 6–12)
POTASSIUM BLD-SCNC: 3.7 MMOL/L (ref 3.5–5.2)
POTASSIUM BLD-SCNC: 4.1 MMOL/L (ref 3.5–5.2)
PROT SERPL-MCNC: 6.2 G/DL (ref 6–8.5)
RBC # BLD AUTO: 3.36 10*6/MM3 (ref 4.14–5.8)
SODIUM BLD-SCNC: 139 MMOL/L (ref 136–145)
WBC NRBC COR # BLD: 7.11 10*3/MM3 (ref 3.4–10.8)

## 2019-11-20 PROCEDURE — 83735 ASSAY OF MAGNESIUM: CPT | Performed by: PHYSICIAN ASSISTANT

## 2019-11-20 PROCEDURE — 25010000002 FUROSEMIDE PER 20 MG: Performed by: PHYSICIAN ASSISTANT

## 2019-11-20 PROCEDURE — 97162 PT EVAL MOD COMPLEX 30 MIN: CPT

## 2019-11-20 PROCEDURE — 25010000002 HEPARIN (PORCINE) PER 1000 UNITS: Performed by: PHYSICIAN ASSISTANT

## 2019-11-20 PROCEDURE — 25010000002 CEFTAROLINE FOSAMIL PER 10 MG: Performed by: INTERNAL MEDICINE

## 2019-11-20 PROCEDURE — 82962 GLUCOSE BLOOD TEST: CPT

## 2019-11-20 PROCEDURE — 85027 COMPLETE CBC AUTOMATED: CPT | Performed by: INTERNAL MEDICINE

## 2019-11-20 PROCEDURE — 84132 ASSAY OF SERUM POTASSIUM: CPT | Performed by: PHYSICIAN ASSISTANT

## 2019-11-20 PROCEDURE — 97530 THERAPEUTIC ACTIVITIES: CPT

## 2019-11-20 PROCEDURE — 99232 SBSQ HOSP IP/OBS MODERATE 35: CPT | Performed by: FAMILY MEDICINE

## 2019-11-20 PROCEDURE — 80053 COMPREHEN METABOLIC PANEL: CPT | Performed by: INTERNAL MEDICINE

## 2019-11-20 PROCEDURE — 25010000002 MAGNESIUM SULFATE 2 GM/50ML SOLUTION: Performed by: FAMILY MEDICINE

## 2019-11-20 RX ORDER — METRONIDAZOLE 500 MG/1
500 TABLET ORAL EVERY 8 HOURS SCHEDULED
Status: COMPLETED | OUTPATIENT
Start: 2019-11-20 | End: 2019-11-24

## 2019-11-20 RX ORDER — OXYCODONE AND ACETAMINOPHEN 7.5; 325 MG/1; MG/1
1 TABLET ORAL EVERY 6 HOURS PRN
Status: DISCONTINUED | OUTPATIENT
Start: 2019-11-20 | End: 2019-11-26 | Stop reason: HOSPADM

## 2019-11-20 RX ORDER — DOXYCYCLINE 100 MG/1
100 CAPSULE ORAL EVERY 12 HOURS SCHEDULED
Status: DISCONTINUED | OUTPATIENT
Start: 2019-11-20 | End: 2019-11-26 | Stop reason: HOSPADM

## 2019-11-20 RX ORDER — LISINOPRIL 5 MG/1
5 TABLET ORAL
Status: DISCONTINUED | OUTPATIENT
Start: 2019-11-20 | End: 2019-11-22

## 2019-11-20 RX ADMIN — MAGNESIUM SULFATE HEPTAHYDRATE 2 G: 40 INJECTION, SOLUTION INTRAVENOUS at 03:22

## 2019-11-20 RX ADMIN — NYSTATIN: 100000 CREAM TOPICAL at 22:19

## 2019-11-20 RX ADMIN — CEFTAROLINE FOSAMIL 600 MG: 600 POWDER, FOR SOLUTION INTRAVENOUS at 08:55

## 2019-11-20 RX ADMIN — ASPIRIN 81 MG: 81 TABLET, COATED ORAL at 08:51

## 2019-11-20 RX ADMIN — HYDRALAZINE HYDROCHLORIDE 25 MG: 25 TABLET, FILM COATED ORAL at 05:59

## 2019-11-20 RX ADMIN — SODIUM CHLORIDE, PRESERVATIVE FREE 10 ML: 5 INJECTION INTRAVENOUS at 22:20

## 2019-11-20 RX ADMIN — METRONIDAZOLE 500 MG: 500 INJECTION, SOLUTION INTRAVENOUS at 14:15

## 2019-11-20 RX ADMIN — INSULIN LISPRO 2 UNITS: 100 INJECTION, SOLUTION INTRAVENOUS; SUBCUTANEOUS at 12:13

## 2019-11-20 RX ADMIN — HYDRALAZINE HYDROCHLORIDE 25 MG: 25 TABLET, FILM COATED ORAL at 22:19

## 2019-11-20 RX ADMIN — HEPARIN SODIUM 5000 UNITS: 5000 INJECTION INTRAVENOUS; SUBCUTANEOUS at 14:15

## 2019-11-20 RX ADMIN — DOXYCYCLINE 100 MG: 100 CAPSULE ORAL at 22:19

## 2019-11-20 RX ADMIN — METOPROLOL TARTRATE 100 MG: 100 TABLET ORAL at 22:20

## 2019-11-20 RX ADMIN — INSULIN LISPRO 2 UNITS: 100 INJECTION, SOLUTION INTRAVENOUS; SUBCUTANEOUS at 18:22

## 2019-11-20 RX ADMIN — METRONIDAZOLE 500 MG: 500 TABLET ORAL at 22:19

## 2019-11-20 RX ADMIN — NYSTATIN: 100000 CREAM TOPICAL at 08:51

## 2019-11-20 RX ADMIN — HYDRALAZINE HYDROCHLORIDE 25 MG: 25 TABLET, FILM COATED ORAL at 14:16

## 2019-11-20 RX ADMIN — FUROSEMIDE 40 MG: 10 INJECTION, SOLUTION INTRAMUSCULAR; INTRAVENOUS at 18:23

## 2019-11-20 RX ADMIN — TERAZOSIN HYDROCHLORIDE ANHYDROUS 5 MG: 5 CAPSULE ORAL at 22:19

## 2019-11-20 RX ADMIN — DULOXETINE HYDROCHLORIDE 60 MG: 60 CAPSULE, DELAYED RELEASE ORAL at 08:51

## 2019-11-20 RX ADMIN — SODIUM CHLORIDE, PRESERVATIVE FREE 10 ML: 5 INJECTION INTRAVENOUS at 22:22

## 2019-11-20 RX ADMIN — METOPROLOL TARTRATE 100 MG: 100 TABLET ORAL at 08:51

## 2019-11-20 RX ADMIN — CEFTAROLINE FOSAMIL 600 MG: 600 POWDER, FOR SOLUTION INTRAVENOUS at 18:22

## 2019-11-20 RX ADMIN — HEPARIN SODIUM 5000 UNITS: 5000 INJECTION INTRAVENOUS; SUBCUTANEOUS at 05:59

## 2019-11-20 RX ADMIN — DOXYCYCLINE 100 MG: 100 INJECTION, POWDER, LYOPHILIZED, FOR SOLUTION INTRAVENOUS at 13:12

## 2019-11-20 RX ADMIN — METRONIDAZOLE 500 MG: 500 INJECTION, SOLUTION INTRAVENOUS at 10:42

## 2019-11-20 RX ADMIN — MAGNESIUM SULFATE HEPTAHYDRATE 2 G: 40 INJECTION, SOLUTION INTRAVENOUS at 05:14

## 2019-11-20 RX ADMIN — FUROSEMIDE 40 MG: 10 INJECTION, SOLUTION INTRAMUSCULAR; INTRAVENOUS at 05:59

## 2019-11-20 RX ADMIN — LISINOPRIL 5 MG: 5 TABLET ORAL at 22:19

## 2019-11-20 RX ADMIN — INSULIN LISPRO 2 UNITS: 100 INJECTION, SOLUTION INTRAVENOUS; SUBCUTANEOUS at 08:51

## 2019-11-20 RX ADMIN — SODIUM CHLORIDE, PRESERVATIVE FREE 10 ML: 5 INJECTION INTRAVENOUS at 08:51

## 2019-11-20 RX ADMIN — HEPARIN SODIUM 5000 UNITS: 5000 INJECTION INTRAVENOUS; SUBCUTANEOUS at 22:23

## 2019-11-20 RX ADMIN — AMLODIPINE BESYLATE 10 MG: 10 TABLET ORAL at 08:51

## 2019-11-21 ENCOUNTER — APPOINTMENT (OUTPATIENT)
Dept: CT IMAGING | Facility: HOSPITAL | Age: 49
End: 2019-11-21

## 2019-11-21 LAB
BACTERIA SPEC AEROBE CULT: ABNORMAL
GLUCOSE BLDC GLUCOMTR-MCNC: 144 MG/DL (ref 70–130)
GLUCOSE BLDC GLUCOMTR-MCNC: 150 MG/DL (ref 70–130)
GLUCOSE BLDC GLUCOMTR-MCNC: 167 MG/DL (ref 70–130)
GLUCOSE BLDC GLUCOMTR-MCNC: 168 MG/DL (ref 70–130)

## 2019-11-21 PROCEDURE — 99233 SBSQ HOSP IP/OBS HIGH 50: CPT | Performed by: INTERNAL MEDICINE

## 2019-11-21 PROCEDURE — 25010000002 FUROSEMIDE PER 20 MG: Performed by: PHYSICIAN ASSISTANT

## 2019-11-21 PROCEDURE — 25010000002 CEFTAROLINE FOSAMIL PER 10 MG: Performed by: INTERNAL MEDICINE

## 2019-11-21 PROCEDURE — 82962 GLUCOSE BLOOD TEST: CPT

## 2019-11-21 PROCEDURE — 25010000002 HEPARIN (PORCINE) PER 1000 UNITS: Performed by: PHYSICIAN ASSISTANT

## 2019-11-21 PROCEDURE — 71250 CT THORAX DX C-: CPT

## 2019-11-21 RX ADMIN — OXYCODONE HYDROCHLORIDE AND ACETAMINOPHEN 1 TABLET: 7.5; 325 TABLET ORAL at 00:16

## 2019-11-21 RX ADMIN — DOXYCYCLINE 100 MG: 100 CAPSULE ORAL at 09:00

## 2019-11-21 RX ADMIN — DOXYCYCLINE 100 MG: 100 CAPSULE ORAL at 21:00

## 2019-11-21 RX ADMIN — INSULIN LISPRO 2 UNITS: 100 INJECTION, SOLUTION INTRAVENOUS; SUBCUTANEOUS at 13:17

## 2019-11-21 RX ADMIN — INSULIN LISPRO 2 UNITS: 100 INJECTION, SOLUTION INTRAVENOUS; SUBCUTANEOUS at 17:29

## 2019-11-21 RX ADMIN — HYDRALAZINE HYDROCHLORIDE 25 MG: 25 TABLET, FILM COATED ORAL at 06:27

## 2019-11-21 RX ADMIN — DULOXETINE HYDROCHLORIDE 60 MG: 60 CAPSULE, DELAYED RELEASE ORAL at 09:00

## 2019-11-21 RX ADMIN — METOPROLOL TARTRATE 100 MG: 100 TABLET ORAL at 21:00

## 2019-11-21 RX ADMIN — HYDRALAZINE HYDROCHLORIDE 25 MG: 25 TABLET, FILM COATED ORAL at 21:00

## 2019-11-21 RX ADMIN — NYSTATIN 1 APPLICATION: 100000 CREAM TOPICAL at 09:01

## 2019-11-21 RX ADMIN — HEPARIN SODIUM 5000 UNITS: 5000 INJECTION INTRAVENOUS; SUBCUTANEOUS at 13:18

## 2019-11-21 RX ADMIN — METRONIDAZOLE 500 MG: 500 TABLET ORAL at 21:00

## 2019-11-21 RX ADMIN — SODIUM CHLORIDE, PRESERVATIVE FREE 10 ML: 5 INJECTION INTRAVENOUS at 21:03

## 2019-11-21 RX ADMIN — FUROSEMIDE 40 MG: 10 INJECTION, SOLUTION INTRAMUSCULAR; INTRAVENOUS at 06:26

## 2019-11-21 RX ADMIN — AMLODIPINE BESYLATE 10 MG: 10 TABLET ORAL at 09:00

## 2019-11-21 RX ADMIN — SODIUM CHLORIDE, PRESERVATIVE FREE 10 ML: 5 INJECTION INTRAVENOUS at 21:02

## 2019-11-21 RX ADMIN — TERAZOSIN HYDROCHLORIDE ANHYDROUS 5 MG: 5 CAPSULE ORAL at 21:00

## 2019-11-21 RX ADMIN — METRONIDAZOLE 500 MG: 500 TABLET ORAL at 06:27

## 2019-11-21 RX ADMIN — METRONIDAZOLE 500 MG: 500 TABLET ORAL at 13:17

## 2019-11-21 RX ADMIN — INSULIN LISPRO 2 UNITS: 100 INJECTION, SOLUTION INTRAVENOUS; SUBCUTANEOUS at 09:00

## 2019-11-21 RX ADMIN — LISINOPRIL 5 MG: 5 TABLET ORAL at 09:00

## 2019-11-21 RX ADMIN — METOPROLOL TARTRATE 100 MG: 100 TABLET ORAL at 09:00

## 2019-11-21 RX ADMIN — ASPIRIN 81 MG: 81 TABLET, COATED ORAL at 09:00

## 2019-11-21 RX ADMIN — CEFTAROLINE FOSAMIL 600 MG: 600 POWDER, FOR SOLUTION INTRAVENOUS at 17:29

## 2019-11-21 RX ADMIN — CEFTAROLINE FOSAMIL 600 MG: 600 POWDER, FOR SOLUTION INTRAVENOUS at 06:27

## 2019-11-21 RX ADMIN — HEPARIN SODIUM 5000 UNITS: 5000 INJECTION INTRAVENOUS; SUBCUTANEOUS at 06:26

## 2019-11-21 RX ADMIN — NYSTATIN: 100000 CREAM TOPICAL at 21:01

## 2019-11-21 RX ADMIN — HYDRALAZINE HYDROCHLORIDE 25 MG: 25 TABLET, FILM COATED ORAL at 13:18

## 2019-11-21 RX ADMIN — HEPARIN SODIUM 5000 UNITS: 5000 INJECTION INTRAVENOUS; SUBCUTANEOUS at 21:00

## 2019-11-21 RX ADMIN — FUROSEMIDE 40 MG: 10 INJECTION, SOLUTION INTRAMUSCULAR; INTRAVENOUS at 17:30

## 2019-11-22 LAB
ANION GAP SERPL CALCULATED.3IONS-SCNC: 17 MMOL/L (ref 5–15)
BASOPHILS # BLD AUTO: 0.08 10*3/MM3 (ref 0–0.2)
BASOPHILS NFR BLD AUTO: 1 % (ref 0–1.5)
BUN BLD-MCNC: 24 MG/DL (ref 6–20)
BUN/CREAT SERPL: 17.3 (ref 7–25)
CALCIUM SPEC-SCNC: 8.9 MG/DL (ref 8.6–10.5)
CHLORIDE SERPL-SCNC: 97 MMOL/L (ref 98–107)
CO2 SERPL-SCNC: 23 MMOL/L (ref 22–29)
CREAT BLD-MCNC: 1.39 MG/DL (ref 0.76–1.27)
DEPRECATED RDW RBC AUTO: 52.7 FL (ref 37–54)
EOSINOPHIL # BLD AUTO: 0.29 10*3/MM3 (ref 0–0.4)
EOSINOPHIL NFR BLD AUTO: 3.6 % (ref 0.3–6.2)
ERYTHROCYTE [DISTWIDTH] IN BLOOD BY AUTOMATED COUNT: 16.1 % (ref 12.3–15.4)
GFR SERPL CREATININE-BSD FRML MDRD: 54 ML/MIN/1.73
GLUCOSE BLD-MCNC: 185 MG/DL (ref 65–99)
GLUCOSE BLDC GLUCOMTR-MCNC: 142 MG/DL (ref 70–130)
GLUCOSE BLDC GLUCOMTR-MCNC: 152 MG/DL (ref 70–130)
GLUCOSE BLDC GLUCOMTR-MCNC: 154 MG/DL (ref 70–130)
GLUCOSE BLDC GLUCOMTR-MCNC: 178 MG/DL (ref 70–130)
HCT VFR BLD AUTO: 32.1 % (ref 37.5–51)
HGB BLD-MCNC: 9.8 G/DL (ref 13–17.7)
IMM GRANULOCYTES # BLD AUTO: 0.03 10*3/MM3 (ref 0–0.05)
IMM GRANULOCYTES NFR BLD AUTO: 0.4 % (ref 0–0.5)
LYMPHOCYTES # BLD AUTO: 2.02 10*3/MM3 (ref 0.7–3.1)
LYMPHOCYTES NFR BLD AUTO: 25.1 % (ref 19.6–45.3)
MCH RBC QN AUTO: 27.8 PG (ref 26.6–33)
MCHC RBC AUTO-ENTMCNC: 30.5 G/DL (ref 31.5–35.7)
MCV RBC AUTO: 90.9 FL (ref 79–97)
MONOCYTES # BLD AUTO: 0.7 10*3/MM3 (ref 0.1–0.9)
MONOCYTES NFR BLD AUTO: 8.7 % (ref 5–12)
NEUTROPHILS # BLD AUTO: 4.93 10*3/MM3 (ref 1.7–7)
NEUTROPHILS NFR BLD AUTO: 61.2 % (ref 42.7–76)
NRBC BLD AUTO-RTO: 0 /100 WBC (ref 0–0.2)
PLATELET # BLD AUTO: 250 10*3/MM3 (ref 140–450)
PMV BLD AUTO: 11.8 FL (ref 6–12)
POTASSIUM BLD-SCNC: 4.1 MMOL/L (ref 3.5–5.2)
RBC # BLD AUTO: 3.53 10*6/MM3 (ref 4.14–5.8)
SODIUM BLD-SCNC: 137 MMOL/L (ref 136–145)
WBC NRBC COR # BLD: 8.05 10*3/MM3 (ref 3.4–10.8)

## 2019-11-22 PROCEDURE — 99233 SBSQ HOSP IP/OBS HIGH 50: CPT | Performed by: INTERNAL MEDICINE

## 2019-11-22 PROCEDURE — 82962 GLUCOSE BLOOD TEST: CPT

## 2019-11-22 PROCEDURE — 25010000002 FUROSEMIDE PER 20 MG: Performed by: PHYSICIAN ASSISTANT

## 2019-11-22 PROCEDURE — 80048 BASIC METABOLIC PNL TOTAL CA: CPT | Performed by: INTERNAL MEDICINE

## 2019-11-22 PROCEDURE — 25010000002 CEFTAROLINE FOSAMIL PER 10 MG: Performed by: INTERNAL MEDICINE

## 2019-11-22 PROCEDURE — 97530 THERAPEUTIC ACTIVITIES: CPT

## 2019-11-22 PROCEDURE — 85025 COMPLETE CBC W/AUTO DIFF WBC: CPT | Performed by: INTERNAL MEDICINE

## 2019-11-22 PROCEDURE — 25010000002 HEPARIN (PORCINE) PER 1000 UNITS: Performed by: PHYSICIAN ASSISTANT

## 2019-11-22 RX ORDER — FUROSEMIDE 10 MG/ML
40 INJECTION INTRAMUSCULAR; INTRAVENOUS DAILY
Status: DISCONTINUED | OUTPATIENT
Start: 2019-11-23 | End: 2019-11-23

## 2019-11-22 RX ORDER — IPRATROPIUM BROMIDE AND ALBUTEROL SULFATE 2.5; .5 MG/3ML; MG/3ML
3 SOLUTION RESPIRATORY (INHALATION) EVERY 4 HOURS PRN
Status: DISCONTINUED | OUTPATIENT
Start: 2019-11-22 | End: 2019-11-26 | Stop reason: HOSPADM

## 2019-11-22 RX ADMIN — HEPARIN SODIUM 5000 UNITS: 5000 INJECTION INTRAVENOUS; SUBCUTANEOUS at 21:44

## 2019-11-22 RX ADMIN — HEPARIN SODIUM 5000 UNITS: 5000 INJECTION INTRAVENOUS; SUBCUTANEOUS at 05:01

## 2019-11-22 RX ADMIN — OXYCODONE HYDROCHLORIDE AND ACETAMINOPHEN 1 TABLET: 7.5; 325 TABLET ORAL at 09:55

## 2019-11-22 RX ADMIN — DULOXETINE HYDROCHLORIDE 60 MG: 60 CAPSULE, DELAYED RELEASE ORAL at 09:54

## 2019-11-22 RX ADMIN — NYSTATIN: 100000 CREAM TOPICAL at 09:56

## 2019-11-22 RX ADMIN — SODIUM CHLORIDE, PRESERVATIVE FREE 10 ML: 5 INJECTION INTRAVENOUS at 21:45

## 2019-11-22 RX ADMIN — HYDRALAZINE HYDROCHLORIDE 25 MG: 25 TABLET, FILM COATED ORAL at 14:31

## 2019-11-22 RX ADMIN — METRONIDAZOLE 500 MG: 500 TABLET ORAL at 14:31

## 2019-11-22 RX ADMIN — FUROSEMIDE 40 MG: 10 INJECTION, SOLUTION INTRAMUSCULAR; INTRAVENOUS at 05:01

## 2019-11-22 RX ADMIN — METOPROLOL TARTRATE 100 MG: 100 TABLET ORAL at 21:44

## 2019-11-22 RX ADMIN — LISINOPRIL 5 MG: 5 TABLET ORAL at 09:54

## 2019-11-22 RX ADMIN — METOPROLOL TARTRATE 100 MG: 100 TABLET ORAL at 09:55

## 2019-11-22 RX ADMIN — HYDRALAZINE HYDROCHLORIDE 25 MG: 25 TABLET, FILM COATED ORAL at 21:44

## 2019-11-22 RX ADMIN — DOXYCYCLINE 100 MG: 100 CAPSULE ORAL at 09:54

## 2019-11-22 RX ADMIN — CEFTAROLINE FOSAMIL 600 MG: 600 POWDER, FOR SOLUTION INTRAVENOUS at 17:23

## 2019-11-22 RX ADMIN — INSULIN LISPRO 2 UNITS: 100 INJECTION, SOLUTION INTRAVENOUS; SUBCUTANEOUS at 12:03

## 2019-11-22 RX ADMIN — HEPARIN SODIUM 5000 UNITS: 5000 INJECTION INTRAVENOUS; SUBCUTANEOUS at 14:31

## 2019-11-22 RX ADMIN — HYDRALAZINE HYDROCHLORIDE 25 MG: 25 TABLET, FILM COATED ORAL at 05:01

## 2019-11-22 RX ADMIN — ASPIRIN 81 MG: 81 TABLET, COATED ORAL at 09:54

## 2019-11-22 RX ADMIN — TERAZOSIN HYDROCHLORIDE ANHYDROUS 5 MG: 5 CAPSULE ORAL at 21:44

## 2019-11-22 RX ADMIN — METRONIDAZOLE 500 MG: 500 TABLET ORAL at 05:01

## 2019-11-22 RX ADMIN — INSULIN LISPRO 2 UNITS: 100 INJECTION, SOLUTION INTRAVENOUS; SUBCUTANEOUS at 09:55

## 2019-11-22 RX ADMIN — CEFTAROLINE FOSAMIL 600 MG: 600 POWDER, FOR SOLUTION INTRAVENOUS at 05:00

## 2019-11-22 RX ADMIN — NYSTATIN: 100000 CREAM TOPICAL at 21:45

## 2019-11-22 RX ADMIN — AMLODIPINE BESYLATE 10 MG: 10 TABLET ORAL at 09:54

## 2019-11-22 RX ADMIN — SODIUM CHLORIDE, PRESERVATIVE FREE 10 ML: 5 INJECTION INTRAVENOUS at 09:57

## 2019-11-22 RX ADMIN — DOXYCYCLINE 100 MG: 100 CAPSULE ORAL at 21:44

## 2019-11-22 RX ADMIN — METRONIDAZOLE 500 MG: 500 TABLET ORAL at 21:44

## 2019-11-23 LAB
ANION GAP SERPL CALCULATED.3IONS-SCNC: 13 MMOL/L (ref 5–15)
BACTERIA SPEC AEROBE CULT: NORMAL
BACTERIA SPEC AEROBE CULT: NORMAL
BASOPHILS # BLD AUTO: 0.08 10*3/MM3 (ref 0–0.2)
BASOPHILS NFR BLD AUTO: 1.2 % (ref 0–1.5)
BUN BLD-MCNC: 25 MG/DL (ref 6–20)
BUN/CREAT SERPL: 19.4 (ref 7–25)
CALCIUM SPEC-SCNC: 8.4 MG/DL (ref 8.6–10.5)
CHLORIDE SERPL-SCNC: 102 MMOL/L (ref 98–107)
CO2 SERPL-SCNC: 26 MMOL/L (ref 22–29)
CREAT BLD-MCNC: 1.29 MG/DL (ref 0.76–1.27)
DEPRECATED RDW RBC AUTO: 52.7 FL (ref 37–54)
EOSINOPHIL # BLD AUTO: 0.37 10*3/MM3 (ref 0–0.4)
EOSINOPHIL NFR BLD AUTO: 5.6 % (ref 0.3–6.2)
ERYTHROCYTE [DISTWIDTH] IN BLOOD BY AUTOMATED COUNT: 15.9 % (ref 12.3–15.4)
GFR SERPL CREATININE-BSD FRML MDRD: 59 ML/MIN/1.73
GLUCOSE BLD-MCNC: 182 MG/DL (ref 65–99)
GLUCOSE BLDC GLUCOMTR-MCNC: 178 MG/DL (ref 70–130)
GLUCOSE BLDC GLUCOMTR-MCNC: 181 MG/DL (ref 70–130)
GLUCOSE BLDC GLUCOMTR-MCNC: 181 MG/DL (ref 70–130)
GLUCOSE BLDC GLUCOMTR-MCNC: 191 MG/DL (ref 70–130)
HCT VFR BLD AUTO: 32.7 % (ref 37.5–51)
HGB BLD-MCNC: 10 G/DL (ref 13–17.7)
IMM GRANULOCYTES # BLD AUTO: 0.02 10*3/MM3 (ref 0–0.05)
IMM GRANULOCYTES NFR BLD AUTO: 0.3 % (ref 0–0.5)
LYMPHOCYTES # BLD AUTO: 1.15 10*3/MM3 (ref 0.7–3.1)
LYMPHOCYTES NFR BLD AUTO: 17.3 % (ref 19.6–45.3)
MCH RBC QN AUTO: 27.5 PG (ref 26.6–33)
MCHC RBC AUTO-ENTMCNC: 30.6 G/DL (ref 31.5–35.7)
MCV RBC AUTO: 90.1 FL (ref 79–97)
MONOCYTES # BLD AUTO: 0.69 10*3/MM3 (ref 0.1–0.9)
MONOCYTES NFR BLD AUTO: 10.4 % (ref 5–12)
NEUTROPHILS # BLD AUTO: 4.33 10*3/MM3 (ref 1.7–7)
NEUTROPHILS NFR BLD AUTO: 65.2 % (ref 42.7–76)
NRBC BLD AUTO-RTO: 0 /100 WBC (ref 0–0.2)
PLATELET # BLD AUTO: 229 10*3/MM3 (ref 140–450)
PMV BLD AUTO: 11.6 FL (ref 6–12)
POTASSIUM BLD-SCNC: 3.8 MMOL/L (ref 3.5–5.2)
RBC # BLD AUTO: 3.63 10*6/MM3 (ref 4.14–5.8)
SODIUM BLD-SCNC: 141 MMOL/L (ref 136–145)
WBC NRBC COR # BLD: 6.64 10*3/MM3 (ref 3.4–10.8)

## 2019-11-23 PROCEDURE — 80048 BASIC METABOLIC PNL TOTAL CA: CPT | Performed by: INTERNAL MEDICINE

## 2019-11-23 PROCEDURE — 25010000002 CEFTRIAXONE PER 250 MG: Performed by: INTERNAL MEDICINE

## 2019-11-23 PROCEDURE — 82962 GLUCOSE BLOOD TEST: CPT

## 2019-11-23 PROCEDURE — 85025 COMPLETE CBC W/AUTO DIFF WBC: CPT | Performed by: INTERNAL MEDICINE

## 2019-11-23 PROCEDURE — 25010000002 HEPARIN (PORCINE) PER 1000 UNITS: Performed by: PHYSICIAN ASSISTANT

## 2019-11-23 PROCEDURE — 25010000002 CEFTAROLINE FOSAMIL PER 10 MG: Performed by: INTERNAL MEDICINE

## 2019-11-23 PROCEDURE — 99233 SBSQ HOSP IP/OBS HIGH 50: CPT | Performed by: INTERNAL MEDICINE

## 2019-11-23 RX ORDER — FUROSEMIDE 40 MG/1
40 TABLET ORAL DAILY
Status: DISCONTINUED | OUTPATIENT
Start: 2019-11-23 | End: 2019-11-26 | Stop reason: HOSPADM

## 2019-11-23 RX ADMIN — ASPIRIN 81 MG: 81 TABLET, COATED ORAL at 09:21

## 2019-11-23 RX ADMIN — HYDRALAZINE HYDROCHLORIDE 25 MG: 25 TABLET, FILM COATED ORAL at 05:45

## 2019-11-23 RX ADMIN — INSULIN LISPRO 2 UNITS: 100 INJECTION, SOLUTION INTRAVENOUS; SUBCUTANEOUS at 09:20

## 2019-11-23 RX ADMIN — INSULIN LISPRO 2 UNITS: 100 INJECTION, SOLUTION INTRAVENOUS; SUBCUTANEOUS at 14:24

## 2019-11-23 RX ADMIN — DOXYCYCLINE 100 MG: 100 CAPSULE ORAL at 21:17

## 2019-11-23 RX ADMIN — INSULIN LISPRO 2 UNITS: 100 INJECTION, SOLUTION INTRAVENOUS; SUBCUTANEOUS at 21:18

## 2019-11-23 RX ADMIN — METRONIDAZOLE 500 MG: 500 TABLET ORAL at 21:17

## 2019-11-23 RX ADMIN — NYSTATIN: 100000 CREAM TOPICAL at 18:28

## 2019-11-23 RX ADMIN — METOPROLOL TARTRATE 100 MG: 100 TABLET ORAL at 21:17

## 2019-11-23 RX ADMIN — METOPROLOL TARTRATE 100 MG: 100 TABLET ORAL at 09:20

## 2019-11-23 RX ADMIN — METRONIDAZOLE 500 MG: 500 TABLET ORAL at 14:24

## 2019-11-23 RX ADMIN — SODIUM CHLORIDE, PRESERVATIVE FREE 10 ML: 5 INJECTION INTRAVENOUS at 21:19

## 2019-11-23 RX ADMIN — HYDRALAZINE HYDROCHLORIDE 25 MG: 25 TABLET, FILM COATED ORAL at 21:17

## 2019-11-23 RX ADMIN — HEPARIN SODIUM 5000 UNITS: 5000 INJECTION INTRAVENOUS; SUBCUTANEOUS at 05:45

## 2019-11-23 RX ADMIN — HEPARIN SODIUM 5000 UNITS: 5000 INJECTION INTRAVENOUS; SUBCUTANEOUS at 21:17

## 2019-11-23 RX ADMIN — CEFTRIAXONE SODIUM 2 G: 2 INJECTION, POWDER, FOR SOLUTION INTRAMUSCULAR; INTRAVENOUS at 13:00

## 2019-11-23 RX ADMIN — DOXYCYCLINE 100 MG: 100 CAPSULE ORAL at 09:20

## 2019-11-23 RX ADMIN — HYDRALAZINE HYDROCHLORIDE 25 MG: 25 TABLET, FILM COATED ORAL at 14:24

## 2019-11-23 RX ADMIN — CEFTAROLINE FOSAMIL 600 MG: 600 POWDER, FOR SOLUTION INTRAVENOUS at 05:45

## 2019-11-23 RX ADMIN — MELATONIN TAB 5 MG 5 MG: 5 TAB at 21:17

## 2019-11-23 RX ADMIN — AMLODIPINE BESYLATE 10 MG: 10 TABLET ORAL at 09:20

## 2019-11-23 RX ADMIN — DULOXETINE HYDROCHLORIDE 60 MG: 60 CAPSULE, DELAYED RELEASE ORAL at 09:21

## 2019-11-23 RX ADMIN — INSULIN LISPRO 2 UNITS: 100 INJECTION, SOLUTION INTRAVENOUS; SUBCUTANEOUS at 18:28

## 2019-11-23 RX ADMIN — HEPARIN SODIUM 5000 UNITS: 5000 INJECTION INTRAVENOUS; SUBCUTANEOUS at 13:00

## 2019-11-23 RX ADMIN — OXYCODONE HYDROCHLORIDE AND ACETAMINOPHEN 1 TABLET: 7.5; 325 TABLET ORAL at 23:39

## 2019-11-23 RX ADMIN — METRONIDAZOLE 500 MG: 500 TABLET ORAL at 05:45

## 2019-11-23 RX ADMIN — FUROSEMIDE 40 MG: 40 TABLET ORAL at 09:21

## 2019-11-23 RX ADMIN — NYSTATIN: 100000 CREAM TOPICAL at 21:18

## 2019-11-23 RX ADMIN — SODIUM CHLORIDE, PRESERVATIVE FREE 10 ML: 5 INJECTION INTRAVENOUS at 09:20

## 2019-11-23 RX ADMIN — TERAZOSIN HYDROCHLORIDE ANHYDROUS 5 MG: 5 CAPSULE ORAL at 21:17

## 2019-11-24 LAB
ANION GAP SERPL CALCULATED.3IONS-SCNC: 13 MMOL/L (ref 5–15)
BUN BLD-MCNC: 23 MG/DL (ref 6–20)
BUN/CREAT SERPL: 20.9 (ref 7–25)
CALCIUM SPEC-SCNC: 8.6 MG/DL (ref 8.6–10.5)
CHLORIDE SERPL-SCNC: 101 MMOL/L (ref 98–107)
CO2 SERPL-SCNC: 25 MMOL/L (ref 22–29)
CREAT BLD-MCNC: 1.1 MG/DL (ref 0.76–1.27)
GFR SERPL CREATININE-BSD FRML MDRD: 71 ML/MIN/1.73
GLUCOSE BLD-MCNC: 127 MG/DL (ref 65–99)
GLUCOSE BLDC GLUCOMTR-MCNC: 119 MG/DL (ref 70–130)
GLUCOSE BLDC GLUCOMTR-MCNC: 130 MG/DL (ref 70–130)
GLUCOSE BLDC GLUCOMTR-MCNC: 141 MG/DL (ref 70–130)
GLUCOSE BLDC GLUCOMTR-MCNC: 156 MG/DL (ref 70–130)
POTASSIUM BLD-SCNC: 3.6 MMOL/L (ref 3.5–5.2)
SODIUM BLD-SCNC: 139 MMOL/L (ref 136–145)

## 2019-11-24 PROCEDURE — 25010000002 HEPARIN (PORCINE) PER 1000 UNITS: Performed by: PHYSICIAN ASSISTANT

## 2019-11-24 PROCEDURE — 99232 SBSQ HOSP IP/OBS MODERATE 35: CPT | Performed by: INTERNAL MEDICINE

## 2019-11-24 PROCEDURE — 25010000002 CEFTRIAXONE PER 250 MG: Performed by: INTERNAL MEDICINE

## 2019-11-24 PROCEDURE — 80048 BASIC METABOLIC PNL TOTAL CA: CPT | Performed by: INTERNAL MEDICINE

## 2019-11-24 PROCEDURE — 82962 GLUCOSE BLOOD TEST: CPT

## 2019-11-24 RX ORDER — BENZONATATE 100 MG/1
100 CAPSULE ORAL 3 TIMES DAILY PRN
Status: DISCONTINUED | OUTPATIENT
Start: 2019-11-24 | End: 2019-11-26 | Stop reason: HOSPADM

## 2019-11-24 RX ADMIN — METOPROLOL TARTRATE 100 MG: 100 TABLET ORAL at 08:45

## 2019-11-24 RX ADMIN — NYSTATIN: 100000 CREAM TOPICAL at 08:46

## 2019-11-24 RX ADMIN — BENZONATATE 100 MG: 100 CAPSULE ORAL at 18:28

## 2019-11-24 RX ADMIN — SODIUM CHLORIDE, PRESERVATIVE FREE 10 ML: 5 INJECTION INTRAVENOUS at 21:09

## 2019-11-24 RX ADMIN — INSULIN LISPRO 2 UNITS: 100 INJECTION, SOLUTION INTRAVENOUS; SUBCUTANEOUS at 17:51

## 2019-11-24 RX ADMIN — TERAZOSIN HYDROCHLORIDE ANHYDROUS 5 MG: 5 CAPSULE ORAL at 21:08

## 2019-11-24 RX ADMIN — HEPARIN SODIUM 5000 UNITS: 5000 INJECTION INTRAVENOUS; SUBCUTANEOUS at 14:32

## 2019-11-24 RX ADMIN — FUROSEMIDE 40 MG: 40 TABLET ORAL at 08:46

## 2019-11-24 RX ADMIN — DOXYCYCLINE 100 MG: 100 CAPSULE ORAL at 21:08

## 2019-11-24 RX ADMIN — HYDRALAZINE HYDROCHLORIDE 25 MG: 25 TABLET, FILM COATED ORAL at 14:32

## 2019-11-24 RX ADMIN — METRONIDAZOLE 500 MG: 500 TABLET ORAL at 14:32

## 2019-11-24 RX ADMIN — AMLODIPINE BESYLATE 10 MG: 10 TABLET ORAL at 08:44

## 2019-11-24 RX ADMIN — CEFTRIAXONE SODIUM 2 G: 2 INJECTION, POWDER, FOR SOLUTION INTRAMUSCULAR; INTRAVENOUS at 09:00

## 2019-11-24 RX ADMIN — ASPIRIN 81 MG: 81 TABLET, COATED ORAL at 08:44

## 2019-11-24 RX ADMIN — DULOXETINE HYDROCHLORIDE 60 MG: 60 CAPSULE, DELAYED RELEASE ORAL at 08:45

## 2019-11-24 RX ADMIN — SODIUM CHLORIDE, PRESERVATIVE FREE 10 ML: 5 INJECTION INTRAVENOUS at 08:46

## 2019-11-24 RX ADMIN — DOXYCYCLINE 100 MG: 100 CAPSULE ORAL at 08:44

## 2019-11-24 RX ADMIN — HEPARIN SODIUM 5000 UNITS: 5000 INJECTION INTRAVENOUS; SUBCUTANEOUS at 06:33

## 2019-11-24 RX ADMIN — NYSTATIN: 100000 CREAM TOPICAL at 21:10

## 2019-11-24 RX ADMIN — HYDRALAZINE HYDROCHLORIDE 25 MG: 25 TABLET, FILM COATED ORAL at 06:33

## 2019-11-24 RX ADMIN — METOPROLOL TARTRATE 100 MG: 100 TABLET ORAL at 21:08

## 2019-11-24 RX ADMIN — HEPARIN SODIUM 5000 UNITS: 5000 INJECTION INTRAVENOUS; SUBCUTANEOUS at 21:09

## 2019-11-24 RX ADMIN — HYDRALAZINE HYDROCHLORIDE 25 MG: 25 TABLET, FILM COATED ORAL at 21:08

## 2019-11-24 RX ADMIN — METRONIDAZOLE 500 MG: 500 TABLET ORAL at 06:33

## 2019-11-25 ENCOUNTER — APPOINTMENT (OUTPATIENT)
Dept: GENERAL RADIOLOGY | Facility: HOSPITAL | Age: 49
End: 2019-11-25

## 2019-11-25 LAB
ANION GAP SERPL CALCULATED.3IONS-SCNC: 13 MMOL/L (ref 5–15)
BUN BLD-MCNC: 22 MG/DL (ref 6–20)
BUN/CREAT SERPL: 18.8 (ref 7–25)
CALCIUM SPEC-SCNC: 9.2 MG/DL (ref 8.6–10.5)
CHLORIDE SERPL-SCNC: 101 MMOL/L (ref 98–107)
CO2 SERPL-SCNC: 26 MMOL/L (ref 22–29)
CREAT BLD-MCNC: 1.17 MG/DL (ref 0.76–1.27)
GFR SERPL CREATININE-BSD FRML MDRD: 66 ML/MIN/1.73
GLUCOSE BLD-MCNC: 170 MG/DL (ref 65–99)
GLUCOSE BLDC GLUCOMTR-MCNC: 136 MG/DL (ref 70–130)
GLUCOSE BLDC GLUCOMTR-MCNC: 139 MG/DL (ref 70–130)
GLUCOSE BLDC GLUCOMTR-MCNC: 155 MG/DL (ref 70–130)
GLUCOSE BLDC GLUCOMTR-MCNC: 160 MG/DL (ref 70–130)
POTASSIUM BLD-SCNC: 3.6 MMOL/L (ref 3.5–5.2)
SODIUM BLD-SCNC: 140 MMOL/L (ref 136–145)

## 2019-11-25 PROCEDURE — 82962 GLUCOSE BLOOD TEST: CPT

## 2019-11-25 PROCEDURE — 80048 BASIC METABOLIC PNL TOTAL CA: CPT | Performed by: INTERNAL MEDICINE

## 2019-11-25 PROCEDURE — 25010000002 HEPARIN (PORCINE) PER 1000 UNITS: Performed by: PHYSICIAN ASSISTANT

## 2019-11-25 PROCEDURE — 71045 X-RAY EXAM CHEST 1 VIEW: CPT

## 2019-11-25 PROCEDURE — 94799 UNLISTED PULMONARY SVC/PX: CPT

## 2019-11-25 PROCEDURE — 99232 SBSQ HOSP IP/OBS MODERATE 35: CPT | Performed by: INTERNAL MEDICINE

## 2019-11-25 PROCEDURE — 25010000002 CEFTRIAXONE PER 250 MG: Performed by: INTERNAL MEDICINE

## 2019-11-25 RX ORDER — HYDRALAZINE HYDROCHLORIDE 50 MG/1
50 TABLET, FILM COATED ORAL EVERY 8 HOURS SCHEDULED
Status: DISCONTINUED | OUTPATIENT
Start: 2019-11-25 | End: 2019-11-26 | Stop reason: HOSPADM

## 2019-11-25 RX ADMIN — DULOXETINE HYDROCHLORIDE 60 MG: 60 CAPSULE, DELAYED RELEASE ORAL at 08:39

## 2019-11-25 RX ADMIN — DOXYCYCLINE 100 MG: 100 CAPSULE ORAL at 08:39

## 2019-11-25 RX ADMIN — DOXYCYCLINE 100 MG: 100 CAPSULE ORAL at 22:01

## 2019-11-25 RX ADMIN — NYSTATIN: 100000 CREAM TOPICAL at 08:38

## 2019-11-25 RX ADMIN — HEPARIN SODIUM 5000 UNITS: 5000 INJECTION INTRAVENOUS; SUBCUTANEOUS at 06:21

## 2019-11-25 RX ADMIN — HYDRALAZINE HYDROCHLORIDE 50 MG: 50 TABLET, FILM COATED ORAL at 22:01

## 2019-11-25 RX ADMIN — ASPIRIN 81 MG: 81 TABLET, COATED ORAL at 08:39

## 2019-11-25 RX ADMIN — BENZONATATE 100 MG: 100 CAPSULE ORAL at 22:01

## 2019-11-25 RX ADMIN — HYDRALAZINE HYDROCHLORIDE 50 MG: 50 TABLET, FILM COATED ORAL at 15:04

## 2019-11-25 RX ADMIN — AMLODIPINE BESYLATE 10 MG: 10 TABLET ORAL at 08:39

## 2019-11-25 RX ADMIN — SODIUM CHLORIDE, PRESERVATIVE FREE 10 ML: 5 INJECTION INTRAVENOUS at 22:03

## 2019-11-25 RX ADMIN — OXYCODONE HYDROCHLORIDE AND ACETAMINOPHEN 1 TABLET: 7.5; 325 TABLET ORAL at 22:01

## 2019-11-25 RX ADMIN — HYDRALAZINE HYDROCHLORIDE 25 MG: 25 TABLET, FILM COATED ORAL at 06:21

## 2019-11-25 RX ADMIN — HEPARIN SODIUM 5000 UNITS: 5000 INJECTION INTRAVENOUS; SUBCUTANEOUS at 15:06

## 2019-11-25 RX ADMIN — FUROSEMIDE 40 MG: 40 TABLET ORAL at 08:39

## 2019-11-25 RX ADMIN — HEPARIN SODIUM 5000 UNITS: 5000 INJECTION INTRAVENOUS; SUBCUTANEOUS at 22:02

## 2019-11-25 RX ADMIN — INSULIN LISPRO 2 UNITS: 100 INJECTION, SOLUTION INTRAVENOUS; SUBCUTANEOUS at 11:57

## 2019-11-25 RX ADMIN — TERAZOSIN HYDROCHLORIDE ANHYDROUS 5 MG: 5 CAPSULE ORAL at 22:01

## 2019-11-25 RX ADMIN — SODIUM CHLORIDE, PRESERVATIVE FREE 10 ML: 5 INJECTION INTRAVENOUS at 08:40

## 2019-11-25 RX ADMIN — CEFTRIAXONE SODIUM 2 G: 2 INJECTION, POWDER, FOR SOLUTION INTRAMUSCULAR; INTRAVENOUS at 08:56

## 2019-11-25 RX ADMIN — BENZONATATE 100 MG: 100 CAPSULE ORAL at 06:24

## 2019-11-25 RX ADMIN — METOPROLOL TARTRATE 100 MG: 100 TABLET ORAL at 22:02

## 2019-11-25 RX ADMIN — METOPROLOL TARTRATE 100 MG: 100 TABLET ORAL at 08:39

## 2019-11-25 RX ADMIN — INSULIN LISPRO 2 UNITS: 100 INJECTION, SOLUTION INTRAVENOUS; SUBCUTANEOUS at 17:45

## 2019-11-26 VITALS
RESPIRATION RATE: 18 BRPM | OXYGEN SATURATION: 95 % | BODY MASS INDEX: 29.16 KG/M2 | SYSTOLIC BLOOD PRESSURE: 142 MMHG | DIASTOLIC BLOOD PRESSURE: 76 MMHG | HEIGHT: 75 IN | HEART RATE: 68 BPM | TEMPERATURE: 97.3 F | WEIGHT: 234.5 LBS

## 2019-11-26 LAB
ANION GAP SERPL CALCULATED.3IONS-SCNC: 12 MMOL/L (ref 5–15)
BASOPHILS # BLD AUTO: 0.09 10*3/MM3 (ref 0–0.2)
BASOPHILS NFR BLD AUTO: 1.4 % (ref 0–1.5)
BUN BLD-MCNC: 23 MG/DL (ref 6–20)
BUN/CREAT SERPL: 21.9 (ref 7–25)
CALCIUM SPEC-SCNC: 9.2 MG/DL (ref 8.6–10.5)
CHLORIDE SERPL-SCNC: 99 MMOL/L (ref 98–107)
CO2 SERPL-SCNC: 28 MMOL/L (ref 22–29)
CREAT BLD-MCNC: 1.05 MG/DL (ref 0.76–1.27)
DEPRECATED RDW RBC AUTO: 50 FL (ref 37–54)
EOSINOPHIL # BLD AUTO: 0.5 10*3/MM3 (ref 0–0.4)
EOSINOPHIL NFR BLD AUTO: 7.6 % (ref 0.3–6.2)
ERYTHROCYTE [DISTWIDTH] IN BLOOD BY AUTOMATED COUNT: 15.5 % (ref 12.3–15.4)
GFR SERPL CREATININE-BSD FRML MDRD: 74 ML/MIN/1.73
GLUCOSE BLD-MCNC: 133 MG/DL (ref 65–99)
GLUCOSE BLDC GLUCOMTR-MCNC: 123 MG/DL (ref 70–130)
GLUCOSE BLDC GLUCOMTR-MCNC: 155 MG/DL (ref 70–130)
HCT VFR BLD AUTO: 32.7 % (ref 37.5–51)
HGB BLD-MCNC: 9.8 G/DL (ref 13–17.7)
IMM GRANULOCYTES # BLD AUTO: 0.02 10*3/MM3 (ref 0–0.05)
IMM GRANULOCYTES NFR BLD AUTO: 0.3 % (ref 0–0.5)
LYMPHOCYTES # BLD AUTO: 1.78 10*3/MM3 (ref 0.7–3.1)
LYMPHOCYTES NFR BLD AUTO: 26.9 % (ref 19.6–45.3)
MCH RBC QN AUTO: 26.5 PG (ref 26.6–33)
MCHC RBC AUTO-ENTMCNC: 30 G/DL (ref 31.5–35.7)
MCV RBC AUTO: 88.4 FL (ref 79–97)
MONOCYTES # BLD AUTO: 1.04 10*3/MM3 (ref 0.1–0.9)
MONOCYTES NFR BLD AUTO: 15.7 % (ref 5–12)
NEUTROPHILS # BLD AUTO: 3.19 10*3/MM3 (ref 1.7–7)
NEUTROPHILS NFR BLD AUTO: 48.1 % (ref 42.7–76)
NRBC BLD AUTO-RTO: 0 /100 WBC (ref 0–0.2)
PLATELET # BLD AUTO: 217 10*3/MM3 (ref 140–450)
PMV BLD AUTO: 11.9 FL (ref 6–12)
POTASSIUM BLD-SCNC: 3.4 MMOL/L (ref 3.5–5.2)
RBC # BLD AUTO: 3.7 10*6/MM3 (ref 4.14–5.8)
SODIUM BLD-SCNC: 139 MMOL/L (ref 136–145)
WBC NRBC COR # BLD: 6.62 10*3/MM3 (ref 3.4–10.8)

## 2019-11-26 PROCEDURE — 85025 COMPLETE CBC W/AUTO DIFF WBC: CPT | Performed by: INTERNAL MEDICINE

## 2019-11-26 PROCEDURE — 25010000002 HEPARIN (PORCINE) PER 1000 UNITS: Performed by: PHYSICIAN ASSISTANT

## 2019-11-26 PROCEDURE — 80048 BASIC METABOLIC PNL TOTAL CA: CPT | Performed by: INTERNAL MEDICINE

## 2019-11-26 PROCEDURE — 82962 GLUCOSE BLOOD TEST: CPT

## 2019-11-26 PROCEDURE — 94799 UNLISTED PULMONARY SVC/PX: CPT

## 2019-11-26 PROCEDURE — 25010000002 CEFTRIAXONE PER 250 MG: Performed by: INTERNAL MEDICINE

## 2019-11-26 PROCEDURE — 97530 THERAPEUTIC ACTIVITIES: CPT

## 2019-11-26 PROCEDURE — 99239 HOSP IP/OBS DSCHRG MGMT >30: CPT | Performed by: INTERNAL MEDICINE

## 2019-11-26 RX ORDER — CHOLECALCIFEROL (VITAMIN D3) 125 MCG
5 CAPSULE ORAL NIGHTLY PRN
Start: 2019-11-26

## 2019-11-26 RX ORDER — HYDRALAZINE HYDROCHLORIDE 50 MG/1
50 TABLET, FILM COATED ORAL EVERY 8 HOURS SCHEDULED
Start: 2019-11-26 | End: 2020-01-01 | Stop reason: HOSPADM

## 2019-11-26 RX ORDER — OXYCODONE AND ACETAMINOPHEN 7.5; 325 MG/1; MG/1
1 TABLET ORAL EVERY 6 HOURS PRN
Qty: 12 TABLET | Refills: 0 | Status: SHIPPED | OUTPATIENT
Start: 2019-11-26 | End: 2019-11-29

## 2019-11-26 RX ORDER — METRONIDAZOLE 500 MG/1
500 TABLET ORAL 3 TIMES DAILY
Qty: 21 TABLET | Refills: 0
Start: 2019-11-26 | End: 2019-12-03

## 2019-11-26 RX ORDER — DOXYCYCLINE HYCLATE 100 MG/1
100 CAPSULE ORAL 2 TIMES DAILY
Qty: 14 CAPSULE | Refills: 0 | Status: SHIPPED | OUTPATIENT
Start: 2019-11-26 | End: 2019-11-26 | Stop reason: HOSPADM

## 2019-11-26 RX ORDER — CEFDINIR 300 MG/1
300 CAPSULE ORAL 2 TIMES DAILY
Qty: 14 CAPSULE | Refills: 0
Start: 2019-11-26 | End: 2019-12-03

## 2019-11-26 RX ORDER — FUROSEMIDE 40 MG/1
40 TABLET ORAL DAILY
Start: 2019-11-27 | End: 2020-01-01

## 2019-11-26 RX ADMIN — FUROSEMIDE 40 MG: 40 TABLET ORAL at 10:07

## 2019-11-26 RX ADMIN — SODIUM CHLORIDE, PRESERVATIVE FREE 10 ML: 5 INJECTION INTRAVENOUS at 10:08

## 2019-11-26 RX ADMIN — DULOXETINE HYDROCHLORIDE 60 MG: 60 CAPSULE, DELAYED RELEASE ORAL at 10:07

## 2019-11-26 RX ADMIN — HEPARIN SODIUM 5000 UNITS: 5000 INJECTION INTRAVENOUS; SUBCUTANEOUS at 06:14

## 2019-11-26 RX ADMIN — DOXYCYCLINE 100 MG: 100 CAPSULE ORAL at 10:08

## 2019-11-26 RX ADMIN — ASPIRIN 81 MG: 81 TABLET, COATED ORAL at 10:07

## 2019-11-26 RX ADMIN — AMLODIPINE BESYLATE 10 MG: 10 TABLET ORAL at 10:07

## 2019-11-26 RX ADMIN — HYDRALAZINE HYDROCHLORIDE 50 MG: 50 TABLET, FILM COATED ORAL at 13:46

## 2019-11-26 RX ADMIN — CEFTRIAXONE SODIUM 2 G: 2 INJECTION, POWDER, FOR SOLUTION INTRAMUSCULAR; INTRAVENOUS at 10:07

## 2019-11-26 RX ADMIN — SODIUM CHLORIDE, PRESERVATIVE FREE 10 ML: 5 INJECTION INTRAVENOUS at 10:13

## 2019-11-26 RX ADMIN — HYDRALAZINE HYDROCHLORIDE 50 MG: 50 TABLET, FILM COATED ORAL at 06:14

## 2019-11-26 RX ADMIN — NYSTATIN: 100000 CREAM TOPICAL at 10:12

## 2019-11-26 RX ADMIN — SODIUM CHLORIDE, PRESERVATIVE FREE 10 ML: 5 INJECTION INTRAVENOUS at 12:23

## 2019-11-26 RX ADMIN — OXYCODONE HYDROCHLORIDE AND ACETAMINOPHEN 1 TABLET: 7.5; 325 TABLET ORAL at 06:14

## 2019-11-26 RX ADMIN — METOPROLOL TARTRATE 100 MG: 100 TABLET ORAL at 10:07

## 2019-11-26 RX ADMIN — INSULIN LISPRO 2 UNITS: 100 INJECTION, SOLUTION INTRAVENOUS; SUBCUTANEOUS at 13:46

## 2019-12-12 ENCOUNTER — OUTSIDE FACILITY SERVICE (OUTPATIENT)
Dept: HOSPITALIST | Facility: HOSPITAL | Age: 49
End: 2019-12-12

## 2019-12-12 PROCEDURE — G0180 MD CERTIFICATION HHA PATIENT: HCPCS | Performed by: INTERNAL MEDICINE

## 2019-12-30 NOTE — TELEPHONE ENCOUNTER
Miriam from [facility] called and behalf of the patient and stated that the patient has had diarrhea for the last two weeks. It has been requested that the patient have a stool sample ordered to check for parasites.     Miriam can be contacted at [phone]  The patient or his wife can be contacted at [phone]

## 2019-12-31 NOTE — TELEPHONE ENCOUNTER
LVM for patient explaining that he will need to call back and schedule an appointment. Office # given for patient to schedule.

## 2020-01-01 ENCOUNTER — LAB REQUISITION (OUTPATIENT)
Dept: LAB | Facility: HOSPITAL | Age: 50
End: 2020-01-01

## 2020-01-01 ENCOUNTER — EPISODE CHANGES (OUTPATIENT)
Dept: CASE MANAGEMENT | Facility: OTHER | Age: 50
End: 2020-01-01

## 2020-01-01 ENCOUNTER — PATIENT OUTREACH (OUTPATIENT)
Dept: CASE MANAGEMENT | Facility: OTHER | Age: 50
End: 2020-01-01

## 2020-01-01 ENCOUNTER — HOSPITAL ENCOUNTER (INPATIENT)
Facility: HOSPITAL | Age: 50
LOS: 1 days | Discharge: LEFT BEFORE DISCHARGE | End: 2020-12-14
Attending: EMERGENCY MEDICINE | Admitting: INTERNAL MEDICINE

## 2020-01-01 ENCOUNTER — APPOINTMENT (OUTPATIENT)
Dept: CARDIOLOGY | Facility: HOSPITAL | Age: 50
End: 2020-01-01

## 2020-01-01 ENCOUNTER — APPOINTMENT (OUTPATIENT)
Dept: GENERAL RADIOLOGY | Facility: HOSPITAL | Age: 50
End: 2020-01-01

## 2020-01-01 ENCOUNTER — HOSPITAL ENCOUNTER (OUTPATIENT)
Facility: HOSPITAL | Age: 50
Setting detail: SURGERY ADMIT
End: 2020-01-01
Attending: THORACIC SURGERY (CARDIOTHORACIC VASCULAR SURGERY) | Admitting: THORACIC SURGERY (CARDIOTHORACIC VASCULAR SURGERY)

## 2020-01-01 ENCOUNTER — TELEPHONE (OUTPATIENT)
Dept: CARDIAC SURGERY | Facility: CLINIC | Age: 50
End: 2020-01-01

## 2020-01-01 ENCOUNTER — APPOINTMENT (OUTPATIENT)
Dept: OTHER | Facility: HOSPITAL | Age: 50
End: 2020-01-01

## 2020-01-01 ENCOUNTER — OFFICE VISIT (OUTPATIENT)
Dept: CARDIAC SURGERY | Facility: CLINIC | Age: 50
End: 2020-01-01

## 2020-01-01 ENCOUNTER — APPOINTMENT (OUTPATIENT)
Dept: PREADMISSION TESTING | Facility: HOSPITAL | Age: 50
End: 2020-01-01

## 2020-01-01 ENCOUNTER — PREP FOR SURGERY (OUTPATIENT)
Dept: OTHER | Facility: HOSPITAL | Age: 50
End: 2020-01-01

## 2020-01-01 ENCOUNTER — APPOINTMENT (OUTPATIENT)
Dept: CT IMAGING | Facility: HOSPITAL | Age: 50
End: 2020-01-01

## 2020-01-01 ENCOUNTER — HOSPITAL ENCOUNTER (EMERGENCY)
Facility: HOSPITAL | Age: 50
Discharge: LEFT BEFORE DISCHARGE | End: 2020-11-02
Attending: EMERGENCY MEDICINE | Admitting: EMERGENCY MEDICINE

## 2020-01-01 ENCOUNTER — TELEPHONE (OUTPATIENT)
Dept: FAMILY MEDICINE CLINIC | Facility: CLINIC | Age: 50
End: 2020-01-01

## 2020-01-01 ENCOUNTER — HOSPITAL ENCOUNTER (INPATIENT)
Facility: HOSPITAL | Age: 50
LOS: 5 days | Discharge: HOSPICE | End: 2020-12-26
Attending: HOSPITALIST | Admitting: INTERNAL MEDICINE

## 2020-01-01 ENCOUNTER — DOCUMENTATION (OUTPATIENT)
Dept: SOCIAL WORK | Facility: HOSPITAL | Age: 50
End: 2020-01-01

## 2020-01-01 ENCOUNTER — READMISSION MANAGEMENT (OUTPATIENT)
Dept: CALL CENTER | Facility: HOSPITAL | Age: 50
End: 2020-01-01

## 2020-01-01 ENCOUNTER — APPOINTMENT (OUTPATIENT)
Dept: MRI IMAGING | Facility: HOSPITAL | Age: 50
End: 2020-01-01

## 2020-01-01 ENCOUNTER — ANESTHESIA EVENT (OUTPATIENT)
Dept: PERIOP | Facility: HOSPITAL | Age: 50
End: 2020-01-01

## 2020-01-01 ENCOUNTER — TRANSITIONAL CARE MANAGEMENT TELEPHONE ENCOUNTER (OUTPATIENT)
Dept: CALL CENTER | Facility: HOSPITAL | Age: 50
End: 2020-01-01

## 2020-01-01 ENCOUNTER — HOSPITAL ENCOUNTER (OUTPATIENT)
Dept: GENERAL RADIOLOGY | Facility: HOSPITAL | Age: 50
Discharge: HOME | End: 2020-05-19
Admitting: NURSE PRACTITIONER

## 2020-01-01 ENCOUNTER — ANESTHESIA (OUTPATIENT)
Dept: PERIOP | Facility: HOSPITAL | Age: 50
End: 2020-01-01

## 2020-01-01 ENCOUNTER — HOSPITAL ENCOUNTER (INPATIENT)
Facility: HOSPITAL | Age: 50
LOS: 4 days | End: 2020-12-30
Attending: INTERNAL MEDICINE | Admitting: INTERNAL MEDICINE

## 2020-01-01 ENCOUNTER — HOSPITAL ENCOUNTER (INPATIENT)
Facility: HOSPITAL | Age: 50
LOS: 17 days | Discharge: NURSING, REHAB OR LONG-TERM CARE | End: 2020-07-24
Attending: FAMILY MEDICINE | Admitting: INTERNAL MEDICINE

## 2020-01-01 ENCOUNTER — ANESTHESIA EVENT (OUTPATIENT)
Dept: GASTROENTEROLOGY | Facility: HOSPITAL | Age: 50
End: 2020-01-01

## 2020-01-01 ENCOUNTER — HOSPITAL ENCOUNTER (EMERGENCY)
Facility: HOSPITAL | Age: 50
Discharge: HOME | End: 2020-09-04
Attending: EMERGENCY MEDICINE | Admitting: EMERGENCY MEDICINE

## 2020-01-01 ENCOUNTER — HOSPITAL ENCOUNTER (INPATIENT)
Facility: HOSPITAL | Age: 50
LOS: 28 days | Discharge: HOME HEALTH | End: 2020-07-06
Attending: EMERGENCY MEDICINE | Admitting: INTERNAL MEDICINE

## 2020-01-01 ENCOUNTER — HOSPITAL ENCOUNTER (EMERGENCY)
Facility: HOSPITAL | Age: 50
Discharge: HOME | End: 2020-09-15
Attending: EMERGENCY MEDICINE | Admitting: EMERGENCY MEDICINE

## 2020-01-01 ENCOUNTER — HOSPITAL ENCOUNTER (INPATIENT)
Facility: HOSPITAL | Age: 50
LOS: 1 days | Discharge: LEFT BEFORE DISCHARGE | End: 2020-11-15
Attending: INTERNAL MEDICINE | Admitting: INTERNAL MEDICINE

## 2020-01-01 ENCOUNTER — OFFICE VISIT (OUTPATIENT)
Dept: PREADMISSION TESTING | Facility: HOSPITAL | Age: 50
End: 2020-01-01

## 2020-01-01 ENCOUNTER — HOSPITAL ENCOUNTER (EMERGENCY)
Facility: HOSPITAL | Age: 50
Discharge: LEFT BEFORE DISCHARGE | End: 2020-05-30
Attending: EMERGENCY MEDICINE | Admitting: INTERNAL MEDICINE

## 2020-01-01 ENCOUNTER — HOSPITAL ENCOUNTER (OUTPATIENT)
Facility: HOSPITAL | Age: 50
Setting detail: SURGERY ADMIT
Discharge: HOME | End: 2020-05-27
Attending: THORACIC SURGERY (CARDIOTHORACIC VASCULAR SURGERY) | Admitting: THORACIC SURGERY (CARDIOTHORACIC VASCULAR SURGERY)

## 2020-01-01 ENCOUNTER — ANESTHESIA (OUTPATIENT)
Dept: GASTROENTEROLOGY | Facility: HOSPITAL | Age: 50
End: 2020-01-01

## 2020-01-01 ENCOUNTER — HOSPITAL ENCOUNTER (INPATIENT)
Facility: HOSPITAL | Age: 50
LOS: 6 days | Discharge: LEFT BEFORE DISCHARGE | End: 2020-10-04
Attending: EMERGENCY MEDICINE | Admitting: INTERNAL MEDICINE

## 2020-01-01 VITALS
SYSTOLIC BLOOD PRESSURE: 122 MMHG | HEART RATE: 90 BPM | OXYGEN SATURATION: 100 % | HEART RATE: 66 BPM | OXYGEN SATURATION: 92 % | SYSTOLIC BLOOD PRESSURE: 163 MMHG | HEIGHT: 75 IN | RESPIRATION RATE: 18 BRPM | TEMPERATURE: 98.1 F | DIASTOLIC BLOOD PRESSURE: 70 MMHG | BODY MASS INDEX: 30.41 KG/M2 | WEIGHT: 240 LBS | DIASTOLIC BLOOD PRESSURE: 100 MMHG | HEIGHT: 75 IN | WEIGHT: 244.6 LBS | BODY MASS INDEX: 29.84 KG/M2 | TEMPERATURE: 97.1 F

## 2020-01-01 VITALS
SYSTOLIC BLOOD PRESSURE: 166 MMHG | TEMPERATURE: 97.5 F | HEART RATE: 82 BPM | DIASTOLIC BLOOD PRESSURE: 96 MMHG | HEIGHT: 75 IN | BODY MASS INDEX: 31.08 KG/M2 | OXYGEN SATURATION: 98 % | WEIGHT: 250 LBS

## 2020-01-01 VITALS
HEART RATE: 91 BPM | HEIGHT: 75 IN | OXYGEN SATURATION: 93 % | RESPIRATION RATE: 18 BRPM | WEIGHT: 240 LBS | SYSTOLIC BLOOD PRESSURE: 170 MMHG | BODY MASS INDEX: 29.84 KG/M2 | DIASTOLIC BLOOD PRESSURE: 96 MMHG | TEMPERATURE: 97.5 F

## 2020-01-01 VITALS
HEART RATE: 90 BPM | DIASTOLIC BLOOD PRESSURE: 87 MMHG | WEIGHT: 235 LBS | OXYGEN SATURATION: 98 % | SYSTOLIC BLOOD PRESSURE: 134 MMHG | TEMPERATURE: 97.8 F | RESPIRATION RATE: 18 BRPM | BODY MASS INDEX: 29.22 KG/M2 | HEIGHT: 75 IN

## 2020-01-01 VITALS
HEART RATE: 88 BPM | WEIGHT: 272.2 LBS | RESPIRATION RATE: 18 BRPM | SYSTOLIC BLOOD PRESSURE: 150 MMHG | DIASTOLIC BLOOD PRESSURE: 96 MMHG | HEIGHT: 75 IN | TEMPERATURE: 98.4 F | OXYGEN SATURATION: 100 % | BODY MASS INDEX: 33.85 KG/M2

## 2020-01-01 VITALS
TEMPERATURE: 97.9 F | SYSTOLIC BLOOD PRESSURE: 120 MMHG | DIASTOLIC BLOOD PRESSURE: 79 MMHG | WEIGHT: 235 LBS | HEART RATE: 71 BPM | OXYGEN SATURATION: 91 % | HEIGHT: 75 IN | BODY MASS INDEX: 29.22 KG/M2 | RESPIRATION RATE: 18 BRPM

## 2020-01-01 VITALS
HEIGHT: 75 IN | SYSTOLIC BLOOD PRESSURE: 176 MMHG | WEIGHT: 246.7 LBS | OXYGEN SATURATION: 95 % | DIASTOLIC BLOOD PRESSURE: 84 MMHG | RESPIRATION RATE: 18 BRPM | TEMPERATURE: 97.6 F | HEART RATE: 58 BPM | BODY MASS INDEX: 30.67 KG/M2

## 2020-01-01 VITALS
DIASTOLIC BLOOD PRESSURE: 86 MMHG | TEMPERATURE: 98.2 F | BODY MASS INDEX: 27.35 KG/M2 | HEART RATE: 75 BPM | SYSTOLIC BLOOD PRESSURE: 163 MMHG | OXYGEN SATURATION: 95 % | WEIGHT: 220 LBS | RESPIRATION RATE: 16 BRPM | HEIGHT: 75 IN

## 2020-01-01 VITALS
RESPIRATION RATE: 16 BRPM | SYSTOLIC BLOOD PRESSURE: 187 MMHG | HEART RATE: 91 BPM | OXYGEN SATURATION: 91 % | TEMPERATURE: 97.5 F | HEIGHT: 75 IN | BODY MASS INDEX: 31.58 KG/M2 | DIASTOLIC BLOOD PRESSURE: 102 MMHG | WEIGHT: 254 LBS

## 2020-01-01 VITALS
DIASTOLIC BLOOD PRESSURE: 107 MMHG | HEIGHT: 75 IN | HEART RATE: 103 BPM | TEMPERATURE: 98 F | OXYGEN SATURATION: 98 % | BODY MASS INDEX: 34.19 KG/M2 | WEIGHT: 275 LBS | SYSTOLIC BLOOD PRESSURE: 186 MMHG | RESPIRATION RATE: 20 BRPM

## 2020-01-01 VITALS
BODY MASS INDEX: 27.35 KG/M2 | HEART RATE: 74 BPM | TEMPERATURE: 97.7 F | HEIGHT: 75 IN | WEIGHT: 220 LBS | DIASTOLIC BLOOD PRESSURE: 84 MMHG | RESPIRATION RATE: 18 BRPM | SYSTOLIC BLOOD PRESSURE: 153 MMHG | OXYGEN SATURATION: 100 %

## 2020-01-01 VITALS
DIASTOLIC BLOOD PRESSURE: 81 MMHG | OXYGEN SATURATION: 100 % | WEIGHT: 307 LBS | HEART RATE: 74 BPM | RESPIRATION RATE: 18 BRPM | SYSTOLIC BLOOD PRESSURE: 135 MMHG | TEMPERATURE: 98.2 F | BODY MASS INDEX: 38.37 KG/M2

## 2020-01-01 VITALS
WEIGHT: 230 LBS | SYSTOLIC BLOOD PRESSURE: 164 MMHG | HEART RATE: 91 BPM | BODY MASS INDEX: 28.6 KG/M2 | OXYGEN SATURATION: 99 % | TEMPERATURE: 97.8 F | DIASTOLIC BLOOD PRESSURE: 99 MMHG | HEIGHT: 75 IN

## 2020-01-01 VITALS
OXYGEN SATURATION: 96 % | SYSTOLIC BLOOD PRESSURE: 148 MMHG | HEART RATE: 98 BPM | DIASTOLIC BLOOD PRESSURE: 86 MMHG | TEMPERATURE: 98 F | HEIGHT: 75 IN | BODY MASS INDEX: 29.22 KG/M2 | RESPIRATION RATE: 22 BRPM | WEIGHT: 235 LBS

## 2020-01-01 VITALS
WEIGHT: 235 LBS | BODY MASS INDEX: 29.22 KG/M2 | DIASTOLIC BLOOD PRESSURE: 80 MMHG | HEIGHT: 75 IN | SYSTOLIC BLOOD PRESSURE: 120 MMHG | TEMPERATURE: 97.5 F | HEART RATE: 96 BPM | OXYGEN SATURATION: 99 %

## 2020-01-01 DIAGNOSIS — Z89.512 S/P BKA (BELOW KNEE AMPUTATION), LEFT (HCC): ICD-10-CM

## 2020-01-01 DIAGNOSIS — I50.9 PLEURAL EFFUSION DUE TO CHF (CONGESTIVE HEART FAILURE) (HCC): Primary | ICD-10-CM

## 2020-01-01 DIAGNOSIS — I10 ESSENTIAL HYPERTENSION: ICD-10-CM

## 2020-01-01 DIAGNOSIS — Z79.4 TYPE 2 DIABETES MELLITUS WITH DIABETIC PERIPHERAL ANGIOPATHY AND GANGRENE, WITH LONG-TERM CURRENT USE OF INSULIN (HCC): ICD-10-CM

## 2020-01-01 DIAGNOSIS — E11.622 DIABETIC ULCER OF RIGHT LOWER LEG (HCC): Primary | ICD-10-CM

## 2020-01-01 DIAGNOSIS — Z89.512 S/P BKA (BELOW KNEE AMPUTATION), LEFT (HCC): Primary | ICD-10-CM

## 2020-01-01 DIAGNOSIS — K75.81 LIVER CIRRHOSIS SECONDARY TO NASH (HCC): ICD-10-CM

## 2020-01-01 DIAGNOSIS — L02.415 ABSCESS OF LEG, RIGHT: Primary | ICD-10-CM

## 2020-01-01 DIAGNOSIS — R33.9 URINARY RETENTION: ICD-10-CM

## 2020-01-01 DIAGNOSIS — N39.0 ACUTE UTI: Primary | ICD-10-CM

## 2020-01-01 DIAGNOSIS — Z91.199 MEDICAL NON-COMPLIANCE: ICD-10-CM

## 2020-01-01 DIAGNOSIS — E11.10 DIABETIC KETOACIDOSIS WITHOUT COMA ASSOCIATED WITH TYPE 2 DIABETES MELLITUS (HCC): ICD-10-CM

## 2020-01-01 DIAGNOSIS — L97.919 DIABETIC ULCER OF RIGHT LOWER LEG (HCC): Primary | ICD-10-CM

## 2020-01-01 DIAGNOSIS — E11.52 TYPE 2 DIABETES MELLITUS WITH DIABETIC PERIPHERAL ANGIOPATHY AND GANGRENE, WITH LONG-TERM CURRENT USE OF INSULIN (HCC): Chronic | ICD-10-CM

## 2020-01-01 DIAGNOSIS — R73.9 HYPERGLYCEMIA: ICD-10-CM

## 2020-01-01 DIAGNOSIS — Z00.00 ROUTINE GENERAL MEDICAL EXAMINATION AT A HEALTH CARE FACILITY: ICD-10-CM

## 2020-01-01 DIAGNOSIS — I50.32 DIASTOLIC CHF, CHRONIC (HCC): ICD-10-CM

## 2020-01-01 DIAGNOSIS — S81.801A WOUND OF RIGHT LOWER EXTREMITY, INITIAL ENCOUNTER: Primary | ICD-10-CM

## 2020-01-01 DIAGNOSIS — I73.9 PERIPHERAL VASCULAR DISEASE (HCC): ICD-10-CM

## 2020-01-01 DIAGNOSIS — I50.9 ACUTE ON CHRONIC HEART FAILURE, UNSPECIFIED HEART FAILURE TYPE (HCC): ICD-10-CM

## 2020-01-01 DIAGNOSIS — R39.89 PNEUMATURIA: ICD-10-CM

## 2020-01-01 DIAGNOSIS — L02.415 ABSCESS OF LEG, RIGHT: ICD-10-CM

## 2020-01-01 DIAGNOSIS — D72.825 BANDEMIA: Primary | ICD-10-CM

## 2020-01-01 DIAGNOSIS — F31.9 BIPOLAR AFFECTIVE DISORDER, REMISSION STATUS UNSPECIFIED (HCC): ICD-10-CM

## 2020-01-01 DIAGNOSIS — Z74.09 IMPAIRED MOBILITY AND ADLS: ICD-10-CM

## 2020-01-01 DIAGNOSIS — A41.9 ACUTE SEPSIS (HCC): Primary | ICD-10-CM

## 2020-01-01 DIAGNOSIS — S81.801D WOUND OF RIGHT LOWER EXTREMITY, SUBSEQUENT ENCOUNTER: ICD-10-CM

## 2020-01-01 DIAGNOSIS — R60.0 EDEMA LEG: ICD-10-CM

## 2020-01-01 DIAGNOSIS — F41.9 ANXIETY DISORDER, UNSPECIFIED TYPE: Primary | ICD-10-CM

## 2020-01-01 DIAGNOSIS — E83.42 HYPOMAGNESEMIA: ICD-10-CM

## 2020-01-01 DIAGNOSIS — N39.0 ACUTE UTI (URINARY TRACT INFECTION): ICD-10-CM

## 2020-01-01 DIAGNOSIS — Z79.4 TYPE 2 DIABETES MELLITUS WITH OTHER CIRCULATORY COMPLICATION, WITH LONG-TERM CURRENT USE OF INSULIN (HCC): ICD-10-CM

## 2020-01-01 DIAGNOSIS — Z91.14 NON COMPLIANCE W MEDICATION REGIMEN: ICD-10-CM

## 2020-01-01 DIAGNOSIS — R77.8 ELEVATED TROPONIN: ICD-10-CM

## 2020-01-01 DIAGNOSIS — E11.52 TYPE 2 DIABETES MELLITUS WITH DIABETIC PERIPHERAL ANGIOPATHY AND GANGRENE, WITH LONG-TERM CURRENT USE OF INSULIN (HCC): ICD-10-CM

## 2020-01-01 DIAGNOSIS — G89.29 OTHER CHRONIC PAIN: ICD-10-CM

## 2020-01-01 DIAGNOSIS — E66.9 OBESITY (BMI 30-39.9): ICD-10-CM

## 2020-01-01 DIAGNOSIS — R41.82 ALTERED MENTAL STATUS, UNSPECIFIED ALTERED MENTAL STATUS TYPE: ICD-10-CM

## 2020-01-01 DIAGNOSIS — E78.2 MIXED HYPERLIPIDEMIA: ICD-10-CM

## 2020-01-01 DIAGNOSIS — I50.43 ACUTE ON CHRONIC COMBINED SYSTOLIC AND DIASTOLIC CONGESTIVE HEART FAILURE (HCC): ICD-10-CM

## 2020-01-01 DIAGNOSIS — L97.919 DIABETIC ULCER OF RIGHT LOWER LEG (HCC): ICD-10-CM

## 2020-01-01 DIAGNOSIS — A04.72 CLOSTRIDIUM DIFFICILE COLITIS: ICD-10-CM

## 2020-01-01 DIAGNOSIS — E11.622 DIABETIC ULCER OF RIGHT LOWER LEG (HCC): ICD-10-CM

## 2020-01-01 DIAGNOSIS — N49.2 CELLULITIS, SCROTUM: ICD-10-CM

## 2020-01-01 DIAGNOSIS — F31.62 BIPOLAR DISORDER, CURRENT EPISODE MIXED, MODERATE (HCC): ICD-10-CM

## 2020-01-01 DIAGNOSIS — I50.9 ACUTE ON CHRONIC CONGESTIVE HEART FAILURE, UNSPECIFIED HEART FAILURE TYPE (HCC): Primary | ICD-10-CM

## 2020-01-01 DIAGNOSIS — J96.01 ACUTE RESPIRATORY FAILURE WITH HYPOXIA (HCC): ICD-10-CM

## 2020-01-01 DIAGNOSIS — E11.59 TYPE 2 DIABETES MELLITUS WITH OTHER CIRCULATORY COMPLICATION, WITH LONG-TERM CURRENT USE OF INSULIN (HCC): ICD-10-CM

## 2020-01-01 DIAGNOSIS — L97.509 DIABETES MELLITUS WITH FOOT ULCER AND GANGRENE (HCC): Primary | ICD-10-CM

## 2020-01-01 DIAGNOSIS — E78.1 HYPERTRIGLYCERIDEMIA, ESSENTIAL: ICD-10-CM

## 2020-01-01 DIAGNOSIS — J96.90: ICD-10-CM

## 2020-01-01 DIAGNOSIS — R11.2 NAUSEA AND VOMITING, INTRACTABILITY OF VOMITING NOT SPECIFIED, UNSPECIFIED VOMITING TYPE: ICD-10-CM

## 2020-01-01 DIAGNOSIS — N12 PYELONEPHRITIS: ICD-10-CM

## 2020-01-01 DIAGNOSIS — N41.2 PROSTATE ABSCESS: ICD-10-CM

## 2020-01-01 DIAGNOSIS — R46.89 SELF NEGLECT: ICD-10-CM

## 2020-01-01 DIAGNOSIS — M79.652 LEFT THIGH PAIN: ICD-10-CM

## 2020-01-01 DIAGNOSIS — L02.91 ABSCESS: ICD-10-CM

## 2020-01-01 DIAGNOSIS — K74.60 LIVER CIRRHOSIS SECONDARY TO NASH (HCC): ICD-10-CM

## 2020-01-01 DIAGNOSIS — N41.0 ACUTE PROSTATITIS: ICD-10-CM

## 2020-01-01 DIAGNOSIS — K74.60 CIRRHOSIS OF LIVER WITHOUT ASCITES, UNSPECIFIED HEPATIC CIRRHOSIS TYPE (HCC): ICD-10-CM

## 2020-01-01 DIAGNOSIS — I50.9 PLEURAL EFFUSION DUE TO CHF (CONGESTIVE HEART FAILURE) (HCC): ICD-10-CM

## 2020-01-01 DIAGNOSIS — E13.43 GASTROPARESIS DUE TO SECONDARY DIABETES (HCC): ICD-10-CM

## 2020-01-01 DIAGNOSIS — Z86.19 HISTORY OF CLOSTRIDIOIDES DIFFICILE COLITIS: ICD-10-CM

## 2020-01-01 DIAGNOSIS — Z78.9 IMPAIRED MOBILITY AND ADLS: ICD-10-CM

## 2020-01-01 DIAGNOSIS — M79.604 RIGHT LEG PAIN: ICD-10-CM

## 2020-01-01 DIAGNOSIS — I21.A1 TYPE 2 MI (MYOCARDIAL INFARCTION) (HCC): ICD-10-CM

## 2020-01-01 DIAGNOSIS — E11.52 DIABETES MELLITUS WITH FOOT ULCER AND GANGRENE (HCC): Primary | ICD-10-CM

## 2020-01-01 DIAGNOSIS — R04.0 EPISTAXIS: ICD-10-CM

## 2020-01-01 DIAGNOSIS — D72.829 LEUKOCYTOSIS, UNSPECIFIED TYPE: ICD-10-CM

## 2020-01-01 DIAGNOSIS — Z79.4 TYPE 2 DIABETES MELLITUS WITH DIABETIC PERIPHERAL ANGIOPATHY AND GANGRENE, WITH LONG-TERM CURRENT USE OF INSULIN (HCC): Chronic | ICD-10-CM

## 2020-01-01 DIAGNOSIS — E11.621 DIABETES MELLITUS WITH FOOT ULCER AND GANGRENE (HCC): Primary | ICD-10-CM

## 2020-01-01 DIAGNOSIS — A41.9 SEPSIS, DUE TO UNSPECIFIED ORGANISM, UNSPECIFIED WHETHER ACUTE ORGAN DYSFUNCTION PRESENT (HCC): ICD-10-CM

## 2020-01-01 DIAGNOSIS — Z86.14 HISTORY OF MRSA INFECTION: ICD-10-CM

## 2020-01-01 DIAGNOSIS — R65.10 SIRS (SYSTEMIC INFLAMMATORY RESPONSE SYNDROME) (HCC): ICD-10-CM

## 2020-01-01 LAB
ABO GROUP BLD: NORMAL
ALBUMIN SERPL-MCNC: 1.9 G/DL (ref 3.5–5.2)
ALBUMIN SERPL-MCNC: 2.1 G/DL (ref 3.5–5.2)
ALBUMIN SERPL-MCNC: 2.1 G/DL (ref 3.5–5.2)
ALBUMIN SERPL-MCNC: 2.2 G/DL (ref 3.5–5.2)
ALBUMIN SERPL-MCNC: 2.3 G/DL (ref 3.5–5.2)
ALBUMIN SERPL-MCNC: 2.4 G/DL (ref 3.5–5.2)
ALBUMIN SERPL-MCNC: 2.5 G/DL (ref 3.5–5.2)
ALBUMIN SERPL-MCNC: 2.6 G/DL (ref 3.5–5.2)
ALBUMIN SERPL-MCNC: 2.8 G/DL (ref 3.5–5.2)
ALBUMIN SERPL-MCNC: 2.9 G/DL (ref 3.5–5.2)
ALBUMIN SERPL-MCNC: 3 G/DL (ref 3.5–5.2)
ALBUMIN SERPL-MCNC: 3.1 G/DL (ref 3.5–5.2)
ALBUMIN SERPL-MCNC: 3.3 G/DL (ref 3.5–5.2)
ALBUMIN SERPL-MCNC: 3.3 G/DL (ref 3.5–5.2)
ALBUMIN SERPL-MCNC: 3.4 G/DL (ref 3.5–5.2)
ALBUMIN SERPL-MCNC: 3.7 G/DL (ref 3.5–5.2)
ALBUMIN/GLOB SERPL: 0.5 G/DL
ALBUMIN/GLOB SERPL: 0.5 G/DL
ALBUMIN/GLOB SERPL: 0.6 G/DL
ALBUMIN/GLOB SERPL: 0.7 G/DL
ALBUMIN/GLOB SERPL: 0.8 G/DL
ALBUMIN/GLOB SERPL: 0.9 G/DL
ALBUMIN/GLOB SERPL: 1 G/DL
ALP SERPL-CCNC: 102 U/L (ref 39–117)
ALP SERPL-CCNC: 111 U/L (ref 39–117)
ALP SERPL-CCNC: 136 U/L (ref 39–117)
ALP SERPL-CCNC: 144 U/L (ref 39–117)
ALP SERPL-CCNC: 148 U/L (ref 39–117)
ALP SERPL-CCNC: 150 U/L (ref 39–117)
ALP SERPL-CCNC: 151 U/L (ref 39–117)
ALP SERPL-CCNC: 153 U/L (ref 39–117)
ALP SERPL-CCNC: 153 U/L (ref 39–117)
ALP SERPL-CCNC: 155 U/L (ref 39–117)
ALP SERPL-CCNC: 164 U/L (ref 39–117)
ALP SERPL-CCNC: 171 U/L (ref 39–117)
ALP SERPL-CCNC: 172 U/L (ref 39–117)
ALP SERPL-CCNC: 182 U/L (ref 39–117)
ALP SERPL-CCNC: 186 U/L (ref 39–117)
ALP SERPL-CCNC: 187 U/L (ref 39–117)
ALP SERPL-CCNC: 200 U/L (ref 39–117)
ALP SERPL-CCNC: 205 U/L (ref 39–117)
ALP SERPL-CCNC: 208 U/L (ref 39–117)
ALP SERPL-CCNC: 222 U/L (ref 39–117)
ALP SERPL-CCNC: 234 U/L (ref 39–117)
ALP SERPL-CCNC: 250 U/L (ref 39–117)
ALP SERPL-CCNC: 79 U/L (ref 39–117)
ALP SERPL-CCNC: 94 U/L (ref 39–117)
ALT SERPL W P-5'-P-CCNC: 10 U/L (ref 1–41)
ALT SERPL W P-5'-P-CCNC: 11 U/L (ref 1–41)
ALT SERPL W P-5'-P-CCNC: 11 U/L (ref 1–41)
ALT SERPL W P-5'-P-CCNC: 12 U/L (ref 1–41)
ALT SERPL W P-5'-P-CCNC: 13 U/L (ref 1–41)
ALT SERPL W P-5'-P-CCNC: 14 U/L (ref 1–41)
ALT SERPL W P-5'-P-CCNC: 14 U/L (ref 1–41)
ALT SERPL W P-5'-P-CCNC: 6 U/L (ref 1–41)
ALT SERPL W P-5'-P-CCNC: 7 U/L (ref 1–41)
ALT SERPL W P-5'-P-CCNC: 8 U/L (ref 1–41)
ALT SERPL W P-5'-P-CCNC: 8 U/L (ref 1–41)
ALT SERPL W P-5'-P-CCNC: 9 U/L (ref 1–41)
AMMONIA BLD-SCNC: 17 UMOL/L (ref 16–60)
AMMONIA BLD-SCNC: 17 UMOL/L (ref 16–60)
AMMONIA BLD-SCNC: 21 UMOL/L (ref 16–60)
AMMONIA BLD-SCNC: 29 UMOL/L (ref 16–60)
AMMONIA BLD-SCNC: 37 UMOL/L (ref 16–60)
ANION GAP SERPL CALCULATED.3IONS-SCNC: 10 MMOL/L (ref 5–15)
ANION GAP SERPL CALCULATED.3IONS-SCNC: 11 MMOL/L (ref 5–15)
ANION GAP SERPL CALCULATED.3IONS-SCNC: 12 MMOL/L (ref 5–15)
ANION GAP SERPL CALCULATED.3IONS-SCNC: 14 MMOL/L (ref 5–15)
ANION GAP SERPL CALCULATED.3IONS-SCNC: 15 MMOL/L (ref 5–15)
ANION GAP SERPL CALCULATED.3IONS-SCNC: 16 MMOL/L (ref 5–15)
ANION GAP SERPL CALCULATED.3IONS-SCNC: 16 MMOL/L (ref 5–15)
ANION GAP SERPL CALCULATED.3IONS-SCNC: 18 MMOL/L (ref 5–15)
ANION GAP SERPL CALCULATED.3IONS-SCNC: 23 MMOL/L (ref 5–15)
ANION GAP SERPL CALCULATED.3IONS-SCNC: 5 MMOL/L (ref 5–15)
ANION GAP SERPL CALCULATED.3IONS-SCNC: 6 MMOL/L (ref 5–15)
ANION GAP SERPL CALCULATED.3IONS-SCNC: 6 MMOL/L (ref 5–15)
ANION GAP SERPL CALCULATED.3IONS-SCNC: 7 MMOL/L (ref 5–15)
ANION GAP SERPL CALCULATED.3IONS-SCNC: 8 MMOL/L (ref 5–15)
ANION GAP SERPL CALCULATED.3IONS-SCNC: 9 MMOL/L (ref 5–15)
APPEARANCE FLD: CLEAR
APTT PPP: 32.8 SECONDS (ref 24–37)
APTT PPP: 33.6 SECONDS (ref 24–37)
ARTERIAL PATENCY WRIST A: ABNORMAL
AST SERPL-CCNC: 11 U/L (ref 1–40)
AST SERPL-CCNC: 12 U/L (ref 1–40)
AST SERPL-CCNC: 13 U/L (ref 1–40)
AST SERPL-CCNC: 14 U/L (ref 1–40)
AST SERPL-CCNC: 14 U/L (ref 1–40)
AST SERPL-CCNC: 15 U/L (ref 1–40)
AST SERPL-CCNC: 16 U/L (ref 1–40)
AST SERPL-CCNC: 17 U/L (ref 1–40)
AST SERPL-CCNC: 17 U/L (ref 1–40)
AST SERPL-CCNC: 18 U/L (ref 1–40)
AST SERPL-CCNC: 19 U/L (ref 1–40)
AST SERPL-CCNC: 21 U/L (ref 1–40)
AST SERPL-CCNC: 21 U/L (ref 1–40)
AST SERPL-CCNC: 24 U/L (ref 1–40)
AST SERPL-CCNC: 29 U/L (ref 1–40)
AST SERPL-CCNC: 9 U/L (ref 1–40)
AST SERPL-CCNC: 9 U/L (ref 1–40)
ATMOSPHERIC PRESS: ABNORMAL MM[HG]
B PARAPERT DNA SPEC QL NAA+PROBE: NOT DETECTED
B PERT DNA SPEC QL NAA+PROBE: NOT DETECTED
B-OH-BUTYR SERPL-SCNC: 1.5 MMOL/L (ref 0.02–0.27)
B-OH-BUTYR SERPL-SCNC: 4.78 MMOL/L (ref 0.02–0.27)
BACTERIA FLD CULT: ABNORMAL
BACTERIA FLD CULT: NORMAL
BACTERIA SPEC AEROBE CULT: ABNORMAL
BACTERIA SPEC AEROBE CULT: NO GROWTH
BACTERIA SPEC AEROBE CULT: NO GROWTH
BACTERIA SPEC AEROBE CULT: NORMAL
BACTERIA SPEC ANAEROBE CULT: NORMAL
BACTERIA UR QL AUTO: ABNORMAL /HPF
BASE EXCESS BLDA CALC-SCNC: -2.8 MMOL/L (ref 0–2)
BASE EXCESS BLDA CALC-SCNC: 10.1 MMOL/L (ref 0–2)
BASE EXCESS BLDA CALC-SCNC: 5.4 MMOL/L (ref 0–2)
BASE EXCESS BLDA CALC-SCNC: 6.2 MMOL/L (ref 0–2)
BASE EXCESS BLDV CALC-SCNC: -4 MMOL/L (ref -2–2)
BASOPHILS # BLD AUTO: 0.06 10*3/MM3 (ref 0–0.2)
BASOPHILS # BLD AUTO: 0.07 10*3/MM3 (ref 0–0.2)
BASOPHILS # BLD AUTO: 0.07 10*3/MM3 (ref 0–0.2)
BASOPHILS # BLD AUTO: 0.08 10*3/MM3 (ref 0–0.2)
BASOPHILS # BLD AUTO: 0.09 10*3/MM3 (ref 0–0.2)
BASOPHILS # BLD AUTO: 0.1 10*3/MM3 (ref 0–0.2)
BASOPHILS # BLD AUTO: 0.1 10*3/MM3 (ref 0–0.2)
BASOPHILS # BLD AUTO: 0.11 10*3/MM3 (ref 0–0.2)
BASOPHILS # BLD AUTO: 0.12 10*3/MM3 (ref 0–0.2)
BASOPHILS # BLD AUTO: 0.12 10*3/MM3 (ref 0–0.2)
BASOPHILS # BLD AUTO: 0.13 10*3/MM3 (ref 0–0.2)
BASOPHILS # BLD AUTO: 0.13 10*3/MM3 (ref 0–0.2)
BASOPHILS # BLD AUTO: 0.15 10*3/MM3 (ref 0–0.2)
BASOPHILS # BLD MANUAL: 0 10*3/MM3 (ref 0–0.2)
BASOPHILS # BLD MANUAL: 0 10*3/MM3 (ref 0–0.2)
BASOPHILS NFR BLD AUTO: 0 % (ref 0–1.5)
BASOPHILS NFR BLD AUTO: 0 % (ref 0–1.5)
BASOPHILS NFR BLD AUTO: 0.2 % (ref 0–1.5)
BASOPHILS NFR BLD AUTO: 0.4 % (ref 0–1.5)
BASOPHILS NFR BLD AUTO: 0.4 % (ref 0–1.5)
BASOPHILS NFR BLD AUTO: 0.5 % (ref 0–1.5)
BASOPHILS NFR BLD AUTO: 0.6 % (ref 0–1.5)
BASOPHILS NFR BLD AUTO: 0.7 % (ref 0–1.5)
BASOPHILS NFR BLD AUTO: 0.8 % (ref 0–1.5)
BASOPHILS NFR BLD AUTO: 0.9 % (ref 0–1.5)
BASOPHILS NFR BLD AUTO: 1 % (ref 0–1.5)
BASOPHILS NFR BLD AUTO: 1.1 % (ref 0–1.5)
BASOPHILS NFR BLD AUTO: 1.1 % (ref 0–1.5)
BASOPHILS NFR BLD AUTO: 1.2 % (ref 0–1.5)
BASOPHILS NFR BLD AUTO: 1.4 % (ref 0–1.5)
BDY SITE: ABNORMAL
BH CV ECHO MEAS - AO MAX PG (FULL): 9 MMHG
BH CV ECHO MEAS - AO MAX PG: 11.7 MMHG
BH CV ECHO MEAS - AO MEAN PG (FULL): 5.4 MMHG
BH CV ECHO MEAS - AO MEAN PG: 7.1 MMHG
BH CV ECHO MEAS - AO ROOT AREA: 8.7 CM^2
BH CV ECHO MEAS - AO ROOT DIAM: 3.3 CM
BH CV ECHO MEAS - AO V2 MAX: 171 CM/SEC
BH CV ECHO MEAS - AO V2 MEAN: 126.3 CM/SEC
BH CV ECHO MEAS - AO V2 VTI: 36 CM
BH CV ECHO MEAS - AVA(I,A): 1.9 CM^2
BH CV ECHO MEAS - AVA(I,D): 1.9 CM^2
BH CV ECHO MEAS - AVA(V,A): 2 CM^2
BH CV ECHO MEAS - AVA(V,D): 2 CM^2
BH CV ECHO MEAS - BSA(HAYCOCK): 2.3 M^2
BH CV ECHO MEAS - BSA(HAYCOCK): 2.4 M^2
BH CV ECHO MEAS - BSA(HAYCOCK): 2.4 M^2
BH CV ECHO MEAS - BSA: 2.3 M^2
BH CV ECHO MEAS - BSA: 2.4 M^2
BH CV ECHO MEAS - BSA: 2.4 M^2
BH CV ECHO MEAS - BZI_BMI: 27.5 KILOGRAMS/M^2
BH CV ECHO MEAS - BZI_BMI: 29.4 KILOGRAMS/M^2
BH CV ECHO MEAS - BZI_BMI: 29.4 KILOGRAMS/M^2
BH CV ECHO MEAS - BZI_METRIC_HEIGHT: 190.5 CM
BH CV ECHO MEAS - BZI_METRIC_WEIGHT: 106.6 KG
BH CV ECHO MEAS - BZI_METRIC_WEIGHT: 106.6 KG
BH CV ECHO MEAS - BZI_METRIC_WEIGHT: 99.8 KG
BH CV ECHO MEAS - EDV(CUBED): 118.7 ML
BH CV ECHO MEAS - EDV(MOD-SP2): 158 ML
BH CV ECHO MEAS - EDV(MOD-SP4): 157 ML
BH CV ECHO MEAS - EDV(TEICH): 113.6 ML
BH CV ECHO MEAS - EF(CUBED): 70.8 %
BH CV ECHO MEAS - EF(MOD-BP): 40 %
BH CV ECHO MEAS - EF(MOD-SP2): 46.2 %
BH CV ECHO MEAS - EF(MOD-SP4): 40.1 %
BH CV ECHO MEAS - EF(TEICH): 62.3 %
BH CV ECHO MEAS - EF{MOD-BP}: 42 %
BH CV ECHO MEAS - ESV(CUBED): 34.6 ML
BH CV ECHO MEAS - ESV(MOD-SP2): 85 ML
BH CV ECHO MEAS - ESV(MOD-SP4): 94 ML
BH CV ECHO MEAS - ESV(TEICH): 42.8 ML
BH CV ECHO MEAS - FS: 33.7 %
BH CV ECHO MEAS - IVS/LVPW: 0.94
BH CV ECHO MEAS - IVSD: 0.98 CM
BH CV ECHO MEAS - LA DIMENSION: 3.7 CM
BH CV ECHO MEAS - LA/AO: 1.1
BH CV ECHO MEAS - LAD MAJOR: 5 CM
BH CV ECHO MEAS - LAT PEAK E' VEL: 6 CM/SEC
BH CV ECHO MEAS - LATERAL E/E' RATIO: 20.8
BH CV ECHO MEAS - LV MASS(C)D: 178.9 GRAMS
BH CV ECHO MEAS - LV MAX PG: 2.7 MMHG
BH CV ECHO MEAS - LV MEAN PG: 1.7 MMHG
BH CV ECHO MEAS - LV V1 MAX: 81.4 CM/SEC
BH CV ECHO MEAS - LV V1 MEAN: 62.4 CM/SEC
BH CV ECHO MEAS - LV V1 VTI: 16.9 CM
BH CV ECHO MEAS - LVIDD: 4.9 CM
BH CV ECHO MEAS - LVIDS: 3.3 CM
BH CV ECHO MEAS - LVLD AP2: 10.5 CM
BH CV ECHO MEAS - LVLD AP4: 10.5 CM
BH CV ECHO MEAS - LVLS AP2: 9.9 CM
BH CV ECHO MEAS - LVLS AP4: 9.6 CM
BH CV ECHO MEAS - LVOT AREA (M): 4.2 CM^2
BH CV ECHO MEAS - LVOT AREA: 4.1 CM^2
BH CV ECHO MEAS - LVOT DIAM: 2.3 CM
BH CV ECHO MEAS - LVPWD: 1 CM
BH CV ECHO MEAS - MED PEAK E' VEL: 8.4 CM/SEC
BH CV ECHO MEAS - MEDIAL E/E' RATIO: 15
BH CV ECHO MEAS - MV A MAX VEL: 98.2 CM/SEC
BH CV ECHO MEAS - MV DEC TIME: 0.26 SEC
BH CV ECHO MEAS - MV E MAX VEL: 128.3 CM/SEC
BH CV ECHO MEAS - MV E/A: 1.3
BH CV ECHO MEAS - PA ACC SLOPE: 1067 CM/SEC^2
BH CV ECHO MEAS - PA ACC TIME: 0.08 SEC
BH CV ECHO MEAS - PA MAX PG: 5.6 MMHG
BH CV ECHO MEAS - PA PR(ACCEL): 42.6 MMHG
BH CV ECHO MEAS - PA V2 MAX: 118.1 CM/SEC
BH CV ECHO MEAS - SV(AO): 314.8 ML
BH CV ECHO MEAS - SV(CUBED): 84 ML
BH CV ECHO MEAS - SV(LVOT): 69.7 ML
BH CV ECHO MEAS - SV(MOD-SP2): 73 ML
BH CV ECHO MEAS - SV(MOD-SP4): 63 ML
BH CV ECHO MEAS - SV(TEICH): 70.7 ML
BH CV ECHO MEAS - TAPSE (>1.6): 2.4 CM2
BH CV ECHO MEASUREMENTS AVERAGE E/E' RATIO: 17.82
BH CV LEA LEFT CFA DISTAL PSV: 172 CM/S
BH CV LEA LEFT CFA PROX PSV: 116 CM/S
BH CV LEA LEFT DFA PROX PSV: 120 CM/S
BH CV LEA LEFT POPITEAL A  MID PSV: 6.87 CM/S
BH CV LEA LEFT SFA DISTAL PSV: 27.3 CM/S
BH CV LEA LEFT SFA MID PSV: 40.9 CM/S
BH CV LEA LEFT SFA PROX PSV: 87.2 CM/S
BH CV LOWER VASCULAR LEFT COMMON FEMORAL AUGMENT: NORMAL
BH CV LOWER VASCULAR LEFT COMMON FEMORAL AUGMENT: NORMAL
BH CV LOWER VASCULAR LEFT COMMON FEMORAL COMPETENT: NORMAL
BH CV LOWER VASCULAR LEFT COMMON FEMORAL COMPRESS: NORMAL
BH CV LOWER VASCULAR LEFT COMMON FEMORAL COMPRESS: NORMAL
BH CV LOWER VASCULAR LEFT COMMON FEMORAL PHASIC: NORMAL
BH CV LOWER VASCULAR LEFT COMMON FEMORAL PHASIC: NORMAL
BH CV LOWER VASCULAR LEFT COMMON FEMORAL SPONT: NORMAL
BH CV LOWER VASCULAR LEFT COMMON FEMORAL SPONT: NORMAL
BH CV LOWER VASCULAR LEFT DISTAL FEMORAL AUGMENT: NORMAL
BH CV LOWER VASCULAR LEFT DISTAL FEMORAL COMPRESS: NORMAL
BH CV LOWER VASCULAR LEFT DISTAL FEMORAL PHASIC: NORMAL
BH CV LOWER VASCULAR LEFT DISTAL FEMORAL SPONT: NORMAL
BH CV LOWER VASCULAR LEFT GASTRONEMIUS COMPRESS: NORMAL
BH CV LOWER VASCULAR LEFT GREATER SAPH AK COMPRESS: NORMAL
BH CV LOWER VASCULAR LEFT LESSER SAPH COMPRESS: NORMAL
BH CV LOWER VASCULAR LEFT MID FEMORAL AUGMENT: NORMAL
BH CV LOWER VASCULAR LEFT MID FEMORAL COMPRESS: NORMAL
BH CV LOWER VASCULAR LEFT MID FEMORAL PHASIC: NORMAL
BH CV LOWER VASCULAR LEFT MID FEMORAL SPONT: NORMAL
BH CV LOWER VASCULAR LEFT POPLITEAL AUGMENT: NORMAL
BH CV LOWER VASCULAR LEFT POPLITEAL COMPRESS: NORMAL
BH CV LOWER VASCULAR LEFT POPLITEAL PHASIC: NORMAL
BH CV LOWER VASCULAR LEFT POPLITEAL SPONT: NORMAL
BH CV LOWER VASCULAR LEFT PROFUNDA FEMORAL COMPRESS: NORMAL
BH CV LOWER VASCULAR LEFT PROXIMAL FEMORAL AUGMENT: NORMAL
BH CV LOWER VASCULAR LEFT PROXIMAL FEMORAL COMPRESS: NORMAL
BH CV LOWER VASCULAR LEFT PROXIMAL FEMORAL PHASIC: NORMAL
BH CV LOWER VASCULAR LEFT PROXIMAL FEMORAL SPONT: NORMAL
BH CV LOWER VASCULAR LEFT SAPHENOFEMORAL JUNCTION AUGMENT: NORMAL
BH CV LOWER VASCULAR LEFT SAPHENOFEMORAL JUNCTION COMPRESS: NORMAL
BH CV LOWER VASCULAR LEFT SAPHENOFEMORAL JUNCTION PHASIC: NORMAL
BH CV LOWER VASCULAR LEFT SAPHENOFEMORAL JUNCTION SPONT: NORMAL
BH CV LOWER VASCULAR RIGHT COMMON FEMORAL AUGMENT: NORMAL
BH CV LOWER VASCULAR RIGHT COMMON FEMORAL COMPRESS: NORMAL
BH CV LOWER VASCULAR RIGHT COMMON FEMORAL PHASIC: NORMAL
BH CV LOWER VASCULAR RIGHT COMMON FEMORAL SPONT: NORMAL
BH CV LOWER VASCULAR RIGHT DISTAL FEMORAL COMPRESS: NORMAL
BH CV LOWER VASCULAR RIGHT GASTRONEMIUS COMPRESS: NORMAL
BH CV LOWER VASCULAR RIGHT GREATER SAPH AK COMPRESS: NORMAL
BH CV LOWER VASCULAR RIGHT GREATER SAPH BK COMPRESS: NORMAL
BH CV LOWER VASCULAR RIGHT LESSER SAPH COMPRESS: NORMAL
BH CV LOWER VASCULAR RIGHT MID FEMORAL AUGMENT: NORMAL
BH CV LOWER VASCULAR RIGHT MID FEMORAL COMPRESS: NORMAL
BH CV LOWER VASCULAR RIGHT MID FEMORAL PHASIC: NORMAL
BH CV LOWER VASCULAR RIGHT MID FEMORAL SPONT: NORMAL
BH CV LOWER VASCULAR RIGHT PERONEAL COMPRESS: NORMAL
BH CV LOWER VASCULAR RIGHT POPLITEAL AUGMENT: NORMAL
BH CV LOWER VASCULAR RIGHT POPLITEAL COMPRESS: NORMAL
BH CV LOWER VASCULAR RIGHT POPLITEAL PHASIC: NORMAL
BH CV LOWER VASCULAR RIGHT POPLITEAL SPONT: NORMAL
BH CV LOWER VASCULAR RIGHT POSTERIOR TIBIAL COMPRESS: NORMAL
BH CV LOWER VASCULAR RIGHT PROXIMAL FEMORAL COMPRESS: NORMAL
BH CV LOWER VASCULAR RIGHT SAPHENOFEMORAL JUNCTION COMPRESS: NORMAL
BH CV VAS BP RIGHT ARM: NORMAL MMHG
BH CV XLRA - RV BASE: 3.7 CM
BH CV XLRA - RV LENGTH: 8.8 CM
BH CV XLRA - RV MID: 3.6 CM
BH CV XLRA - TDI S': 13.2 CM/SEC
BILIRUB SERPL-MCNC: 0.2 MG/DL (ref 0.2–1.2)
BILIRUB SERPL-MCNC: 0.2 MG/DL (ref 0.2–1.2)
BILIRUB SERPL-MCNC: 0.2 MG/DL (ref 0–1.2)
BILIRUB SERPL-MCNC: 0.3 MG/DL (ref 0.2–1.2)
BILIRUB SERPL-MCNC: 0.3 MG/DL (ref 0–1.2)
BILIRUB SERPL-MCNC: 0.4 MG/DL (ref 0–1.2)
BILIRUB SERPL-MCNC: 0.5 MG/DL (ref 0–1.2)
BILIRUB SERPL-MCNC: 0.5 MG/DL (ref 0–1.2)
BILIRUB SERPL-MCNC: 0.8 MG/DL (ref 0–1.2)
BILIRUB SERPL-MCNC: 0.9 MG/DL (ref 0–1.2)
BILIRUB SERPL-MCNC: <0.2 MG/DL (ref 0–1.2)
BILIRUB UR QL STRIP: ABNORMAL
BILIRUB UR QL STRIP: NEGATIVE
BLD GP AB SCN SERPL QL: NEGATIVE
BODY TEMPERATURE: 37 C
BUN BLD-MCNC: 10 MG/DL (ref 6–20)
BUN BLD-MCNC: 10 MG/DL (ref 6–20)
BUN BLD-MCNC: 12 MG/DL (ref 6–20)
BUN BLD-MCNC: 13 MG/DL (ref 6–20)
BUN BLD-MCNC: 14 MG/DL (ref 6–20)
BUN BLD-MCNC: 15 MG/DL (ref 6–20)
BUN BLD-MCNC: 16 MG/DL (ref 6–20)
BUN BLD-MCNC: 16 MG/DL (ref 6–20)
BUN BLD-MCNC: 17 MG/DL (ref 6–20)
BUN BLD-MCNC: 18 MG/DL (ref 6–20)
BUN BLD-MCNC: 20 MG/DL (ref 6–20)
BUN BLD-MCNC: 20 MG/DL (ref 6–20)
BUN BLD-MCNC: 21 MG/DL (ref 6–20)
BUN BLD-MCNC: 23 MG/DL (ref 6–20)
BUN BLD-MCNC: 24 MG/DL (ref 6–20)
BUN BLD-MCNC: 30 MG/DL (ref 6–20)
BUN BLD-MCNC: 33 MG/DL (ref 6–20)
BUN BLD-MCNC: 8 MG/DL (ref 6–20)
BUN SERPL-MCNC: 10 MG/DL (ref 6–20)
BUN SERPL-MCNC: 11 MG/DL (ref 6–20)
BUN SERPL-MCNC: 12 MG/DL (ref 6–20)
BUN SERPL-MCNC: 12 MG/DL (ref 6–20)
BUN SERPL-MCNC: 13 MG/DL (ref 6–20)
BUN SERPL-MCNC: 15 MG/DL (ref 6–20)
BUN SERPL-MCNC: 15 MG/DL (ref 6–20)
BUN SERPL-MCNC: 16 MG/DL (ref 6–20)
BUN SERPL-MCNC: 16 MG/DL (ref 6–20)
BUN SERPL-MCNC: 18 MG/DL (ref 6–20)
BUN SERPL-MCNC: 19 MG/DL (ref 6–20)
BUN SERPL-MCNC: 20 MG/DL (ref 6–20)
BUN SERPL-MCNC: 21 MG/DL (ref 6–20)
BUN SERPL-MCNC: 21 MG/DL (ref 6–20)
BUN SERPL-MCNC: 22 MG/DL (ref 6–20)
BUN SERPL-MCNC: 22 MG/DL (ref 6–20)
BUN SERPL-MCNC: 23 MG/DL (ref 6–20)
BUN SERPL-MCNC: 24 MG/DL (ref 6–20)
BUN SERPL-MCNC: 24 MG/DL (ref 6–20)
BUN SERPL-MCNC: 27 MG/DL (ref 6–20)
BUN SERPL-MCNC: 28 MG/DL (ref 6–20)
BUN SERPL-MCNC: 30 MG/DL (ref 6–20)
BUN SERPL-MCNC: 31 MG/DL (ref 6–20)
BUN SERPL-MCNC: 33 MG/DL (ref 6–20)
BUN SERPL-MCNC: 34 MG/DL (ref 6–20)
BUN SERPL-MCNC: 37 MG/DL (ref 6–20)
BUN SERPL-MCNC: 42 MG/DL (ref 6–20)
BUN SERPL-MCNC: 42 MG/DL (ref 6–20)
BUN SERPL-MCNC: 43 MG/DL (ref 6–20)
BUN SERPL-MCNC: 9 MG/DL (ref 6–20)
BUN/CREAT SERPL: 15.7 (ref 7–25)
BUN/CREAT SERPL: 17 (ref 7–25)
BUN/CREAT SERPL: 20.3 (ref 7–25)
BUN/CREAT SERPL: 20.3 (ref 7–25)
BUN/CREAT SERPL: 20.4 (ref 7–25)
BUN/CREAT SERPL: 20.4 (ref 7–25)
BUN/CREAT SERPL: 20.6 (ref 7–25)
BUN/CREAT SERPL: 20.8 (ref 7–25)
BUN/CREAT SERPL: 20.8 (ref 7–25)
BUN/CREAT SERPL: 21.8 (ref 7–25)
BUN/CREAT SERPL: 22.2 (ref 7–25)
BUN/CREAT SERPL: 22.7 (ref 7–25)
BUN/CREAT SERPL: 22.7 (ref 7–25)
BUN/CREAT SERPL: 23.4 (ref 7–25)
BUN/CREAT SERPL: 23.8 (ref 7–25)
BUN/CREAT SERPL: 23.8 (ref 7–25)
BUN/CREAT SERPL: 24.1 (ref 7–25)
BUN/CREAT SERPL: 25 (ref 7–25)
BUN/CREAT SERPL: 25.3 (ref 7–25)
BUN/CREAT SERPL: 26.1 (ref 7–25)
BUN/CREAT SERPL: 27.1 (ref 7–25)
BUN/CREAT SERPL: 27.4 (ref 7–25)
BUN/CREAT SERPL: 28.2 (ref 7–25)
BUN/CREAT SERPL: 28.6 (ref 7–25)
BUN/CREAT SERPL: 29.3 (ref 7–25)
BUN/CREAT SERPL: 29.4 (ref 7–25)
BUN/CREAT SERPL: 30.8 (ref 7–25)
BUN/CREAT SERPL: 31.5 (ref 7–25)
BUN/CREAT SERPL: 32.9 (ref 7–25)
BUN/CREAT SERPL: 32.9 (ref 7–25)
BUN/CREAT SERPL: 33.3 (ref 7–25)
BUN/CREAT SERPL: 33.3 (ref 7–25)
BUN/CREAT SERPL: 33.8 (ref 7–25)
BUN/CREAT SERPL: 34 (ref 7–25)
BUN/CREAT SERPL: 34.6 (ref 7–25)
BUN/CREAT SERPL: 34.9 (ref 7–25)
BUN/CREAT SERPL: 35.3 (ref 7–25)
BUN/CREAT SERPL: 36.2 (ref 7–25)
BUN/CREAT SERPL: 38.1 (ref 7–25)
BUN/CREAT SERPL: 39.3 (ref 7–25)
BUN/CREAT SERPL: 39.6 (ref 7–25)
BUN/CREAT SERPL: 39.6 (ref 7–25)
BUN/CREAT SERPL: 40 (ref 7–25)
BUN/CREAT SERPL: 40.4 (ref 7–25)
BUN/CREAT SERPL: 40.7 (ref 7–25)
BUN/CREAT SERPL: 41.7 (ref 7–25)
BUN/CREAT SERPL: 41.7 (ref 7–25)
BUN/CREAT SERPL: 41.8 (ref 7–25)
BUN/CREAT SERPL: 43.2 (ref 7–25)
BUN/CREAT SERPL: 43.5 (ref 7–25)
BUN/CREAT SERPL: 45 (ref 7–25)
BUN/CREAT SERPL: 45.3 (ref 7–25)
BUN/CREAT SERPL: 46.6 (ref 7–25)
BUN/CREAT SERPL: 47.5 (ref 7–25)
BUN/CREAT SERPL: 48.7 (ref 7–25)
BUN/CREAT SERPL: 50.6 (ref 7–25)
BUN/CREAT SERPL: 50.7 (ref 7–25)
BUN/CREAT SERPL: 51.6 (ref 7–25)
BUN/CREAT SERPL: 55.8 (ref 7–25)
BUN/CREAT SERPL: 61.7 (ref 7–25)
BUN/CREAT SERPL: 62 (ref 7–25)
C DIFF TOX GENS STL QL NAA+PROBE: DETECTED
C DIFF TOX GENS STL QL NAA+PROBE: NOT DETECTED
C PNEUM DNA NPH QL NAA+NON-PROBE: NOT DETECTED
CA-I SERPL ISE-MCNC: 1.21 MMOL/L (ref 1.12–1.32)
CALCIUM SPEC-SCNC: 7.2 MG/DL (ref 8.6–10.5)
CALCIUM SPEC-SCNC: 7.6 MG/DL (ref 8.6–10.5)
CALCIUM SPEC-SCNC: 7.7 MG/DL (ref 8.6–10.5)
CALCIUM SPEC-SCNC: 7.8 MG/DL (ref 8.6–10.5)
CALCIUM SPEC-SCNC: 7.9 MG/DL (ref 8.6–10.5)
CALCIUM SPEC-SCNC: 8 MG/DL (ref 8.6–10.5)
CALCIUM SPEC-SCNC: 8.1 MG/DL (ref 8.6–10.5)
CALCIUM SPEC-SCNC: 8.2 MG/DL (ref 8.6–10.5)
CALCIUM SPEC-SCNC: 8.3 MG/DL (ref 8.6–10.5)
CALCIUM SPEC-SCNC: 8.4 MG/DL (ref 8.6–10.5)
CALCIUM SPEC-SCNC: 8.5 MG/DL (ref 8.6–10.5)
CALCIUM SPEC-SCNC: 8.6 MG/DL (ref 8.6–10.5)
CALCIUM SPEC-SCNC: 8.7 MG/DL (ref 8.6–10.5)
CALCIUM SPEC-SCNC: 8.8 MG/DL (ref 8.6–10.5)
CALCIUM SPEC-SCNC: 8.8 MG/DL (ref 8.6–10.5)
CALCIUM SPEC-SCNC: 8.9 MG/DL (ref 8.6–10.5)
CALCIUM SPEC-SCNC: 8.9 MG/DL (ref 8.6–10.5)
CALCIUM SPEC-SCNC: 9.1 MG/DL (ref 8.6–10.5)
CALCIUM SPEC-SCNC: 9.1 MG/DL (ref 8.6–10.5)
CALCIUM SPEC-SCNC: 9.3 MG/DL (ref 8.6–10.5)
CHLORIDE SERPL-SCNC: 100 MMOL/L (ref 98–107)
CHLORIDE SERPL-SCNC: 101 MMOL/L (ref 98–107)
CHLORIDE SERPL-SCNC: 102 MMOL/L (ref 98–107)
CHLORIDE SERPL-SCNC: 103 MMOL/L (ref 98–107)
CHLORIDE SERPL-SCNC: 104 MMOL/L (ref 98–107)
CHLORIDE SERPL-SCNC: 105 MMOL/L (ref 98–107)
CHLORIDE SERPL-SCNC: 106 MMOL/L (ref 98–107)
CHLORIDE SERPL-SCNC: 107 MMOL/L (ref 98–107)
CHLORIDE SERPL-SCNC: 108 MMOL/L (ref 98–107)
CHLORIDE SERPL-SCNC: 111 MMOL/L (ref 98–107)
CHLORIDE SERPL-SCNC: 91 MMOL/L (ref 98–107)
CHLORIDE SERPL-SCNC: 91 MMOL/L (ref 98–107)
CHLORIDE SERPL-SCNC: 92 MMOL/L (ref 98–107)
CHLORIDE SERPL-SCNC: 93 MMOL/L (ref 98–107)
CHLORIDE SERPL-SCNC: 93 MMOL/L (ref 98–107)
CHLORIDE SERPL-SCNC: 96 MMOL/L (ref 98–107)
CHLORIDE SERPL-SCNC: 96 MMOL/L (ref 98–107)
CHLORIDE SERPL-SCNC: 97 MMOL/L (ref 98–107)
CHLORIDE SERPL-SCNC: 98 MMOL/L (ref 98–107)
CHLORIDE SERPL-SCNC: 99 MMOL/L (ref 98–107)
CHOLEST SERPL-MCNC: 124 MG/DL (ref 0–200)
CK SERPL-CCNC: 18 U/L (ref 20–200)
CK SERPL-CCNC: 40 U/L (ref 20–200)
CK SERPL-CCNC: 40 U/L (ref 20–200)
CK SERPL-CCNC: 45 U/L (ref 20–200)
CK SERPL-CCNC: 53 U/L (ref 20–200)
CK SERPL-CCNC: 56 U/L (ref 20–200)
CLARITY UR: ABNORMAL
CLARITY UR: CLEAR
CO2 BLDA-SCNC: 23.8 MMOL/L (ref 22–33)
CO2 BLDA-SCNC: 24.6 MMOL/L (ref 22–33)
CO2 BLDA-SCNC: 30.1 MMOL/L (ref 22–33)
CO2 BLDA-SCNC: 30.9 MMOL/L (ref 22–33)
CO2 BLDA-SCNC: 37 MMOL/L (ref 22–33)
CO2 SERPL-SCNC: 17 MMOL/L (ref 22–29)
CO2 SERPL-SCNC: 20 MMOL/L (ref 22–29)
CO2 SERPL-SCNC: 20 MMOL/L (ref 22–29)
CO2 SERPL-SCNC: 21 MMOL/L (ref 22–29)
CO2 SERPL-SCNC: 22 MMOL/L (ref 22–29)
CO2 SERPL-SCNC: 23 MMOL/L (ref 22–29)
CO2 SERPL-SCNC: 24 MMOL/L (ref 22–29)
CO2 SERPL-SCNC: 25 MMOL/L (ref 22–29)
CO2 SERPL-SCNC: 26 MMOL/L (ref 22–29)
CO2 SERPL-SCNC: 27 MMOL/L (ref 22–29)
CO2 SERPL-SCNC: 28 MMOL/L (ref 22–29)
CO2 SERPL-SCNC: 29 MMOL/L (ref 22–29)
CO2 SERPL-SCNC: 32 MMOL/L (ref 22–29)
CO2 SERPL-SCNC: 33 MMOL/L (ref 22–29)
COHGB MFR BLD: 1 %
COHGB MFR BLD: 1 % (ref 0–2)
COHGB MFR BLD: 1.7 % (ref 0–2)
COHGB MFR BLD: 1.7 % (ref 0–2)
COHGB MFR BLD: 2 % (ref 0–2)
COLOR FLD: YELLOW
COLOR UR: ABNORMAL
COLOR UR: ABNORMAL
COLOR UR: YELLOW
CREAT BLD-MCNC: 0.41 MG/DL (ref 0.76–1.27)
CREAT BLD-MCNC: 0.42 MG/DL (ref 0.76–1.27)
CREAT BLD-MCNC: 0.46 MG/DL (ref 0.76–1.27)
CREAT BLD-MCNC: 0.48 MG/DL (ref 0.76–1.27)
CREAT BLD-MCNC: 0.49 MG/DL (ref 0.76–1.27)
CREAT BLD-MCNC: 0.51 MG/DL (ref 0.76–1.27)
CREAT BLD-MCNC: 0.51 MG/DL (ref 0.76–1.27)
CREAT BLD-MCNC: 0.52 MG/DL (ref 0.76–1.27)
CREAT BLD-MCNC: 0.52 MG/DL (ref 0.76–1.27)
CREAT BLD-MCNC: 0.55 MG/DL (ref 0.76–1.27)
CREAT BLD-MCNC: 0.56 MG/DL (ref 0.76–1.27)
CREAT BLD-MCNC: 0.6 MG/DL (ref 0.76–1.27)
CREAT BLD-MCNC: 0.62 MG/DL (ref 0.76–1.27)
CREAT BLD-MCNC: 0.63 MG/DL (ref 0.76–1.27)
CREAT BLD-MCNC: 0.68 MG/DL (ref 0.76–1.27)
CREAT BLD-MCNC: 0.68 MG/DL (ref 0.76–1.27)
CREAT BLD-MCNC: 0.73 MG/DL (ref 0.76–1.27)
CREAT BLD-MCNC: 0.74 MG/DL (ref 0.76–1.27)
CREAT BLD-MCNC: 0.79 MG/DL (ref 0.76–1.27)
CREAT BLD-MCNC: 0.8 MG/DL (ref 0.76–1.27)
CREAT BLD-MCNC: 0.83 MG/DL (ref 0.76–1.27)
CREAT BLD-MCNC: 0.86 MG/DL (ref 0.76–1.27)
CREAT SERPL-MCNC: 0.39 MG/DL (ref 0.76–1.27)
CREAT SERPL-MCNC: 0.4 MG/DL (ref 0.76–1.27)
CREAT SERPL-MCNC: 0.42 MG/DL (ref 0.76–1.27)
CREAT SERPL-MCNC: 0.44 MG/DL (ref 0.76–1.27)
CREAT SERPL-MCNC: 0.44 MG/DL (ref 0.76–1.27)
CREAT SERPL-MCNC: 0.45 MG/DL (ref 0.76–1.27)
CREAT SERPL-MCNC: 0.46 MG/DL (ref 0.76–1.27)
CREAT SERPL-MCNC: 0.47 MG/DL (ref 0.76–1.27)
CREAT SERPL-MCNC: 0.47 MG/DL (ref 0.76–1.27)
CREAT SERPL-MCNC: 0.48 MG/DL (ref 0.76–1.27)
CREAT SERPL-MCNC: 0.48 MG/DL (ref 0.76–1.27)
CREAT SERPL-MCNC: 0.5 MG/DL (ref 0.76–1.27)
CREAT SERPL-MCNC: 0.5 MG/DL (ref 0.76–1.27)
CREAT SERPL-MCNC: 0.53 MG/DL (ref 0.76–1.27)
CREAT SERPL-MCNC: 0.54 MG/DL (ref 0.76–1.27)
CREAT SERPL-MCNC: 0.56 MG/DL (ref 0.76–1.27)
CREAT SERPL-MCNC: 0.58 MG/DL (ref 0.76–1.27)
CREAT SERPL-MCNC: 0.58 MG/DL (ref 0.76–1.27)
CREAT SERPL-MCNC: 0.59 MG/DL (ref 0.76–1.27)
CREAT SERPL-MCNC: 0.6 MG/DL (ref 0.76–1.27)
CREAT SERPL-MCNC: 0.64 MG/DL (ref 0.76–1.27)
CREAT SERPL-MCNC: 0.66 MG/DL (ref 0.76–1.27)
CREAT SERPL-MCNC: 0.67 MG/DL (ref 0.76–1.27)
CREAT SERPL-MCNC: 0.68 MG/DL (ref 0.76–1.27)
CREAT SERPL-MCNC: 0.72 MG/DL (ref 0.76–1.27)
CREAT SERPL-MCNC: 0.77 MG/DL (ref 0.76–1.27)
CREAT SERPL-MCNC: 0.78 MG/DL (ref 0.76–1.27)
CREAT SERPL-MCNC: 0.83 MG/DL (ref 0.76–1.27)
CREAT SERPL-MCNC: 1.04 MG/DL (ref 0.76–1.27)
CRP SERPL-MCNC: 1.23 MG/DL (ref 0–0.5)
CRP SERPL-MCNC: 19.56 MG/DL (ref 0–0.5)
CRP SERPL-MCNC: 3.49 MG/DL (ref 0–0.5)
CYTO UR: NORMAL
D-LACTATE SERPL-SCNC: 0.8 MMOL/L (ref 0.5–2)
D-LACTATE SERPL-SCNC: 0.9 MMOL/L (ref 0.5–2)
D-LACTATE SERPL-SCNC: 1.2 MMOL/L (ref 0.5–2)
D-LACTATE SERPL-SCNC: 1.2 MMOL/L (ref 0.5–2)
D-LACTATE SERPL-SCNC: 1.3 MMOL/L (ref 0.5–2)
D-LACTATE SERPL-SCNC: 1.6 MMOL/L (ref 0.5–2)
DEPRECATED RDW RBC AUTO: 38.5 FL (ref 37–54)
DEPRECATED RDW RBC AUTO: 38.8 FL (ref 37–54)
DEPRECATED RDW RBC AUTO: 39.3 FL (ref 37–54)
DEPRECATED RDW RBC AUTO: 41 FL (ref 37–54)
DEPRECATED RDW RBC AUTO: 41.2 FL (ref 37–54)
DEPRECATED RDW RBC AUTO: 42.3 FL (ref 37–54)
DEPRECATED RDW RBC AUTO: 43.3 FL (ref 37–54)
DEPRECATED RDW RBC AUTO: 43.5 FL (ref 37–54)
DEPRECATED RDW RBC AUTO: 43.8 FL (ref 37–54)
DEPRECATED RDW RBC AUTO: 43.9 FL (ref 37–54)
DEPRECATED RDW RBC AUTO: 43.9 FL (ref 37–54)
DEPRECATED RDW RBC AUTO: 44.1 FL (ref 37–54)
DEPRECATED RDW RBC AUTO: 44.1 FL (ref 37–54)
DEPRECATED RDW RBC AUTO: 45.4 FL (ref 37–54)
DEPRECATED RDW RBC AUTO: 45.9 FL (ref 37–54)
DEPRECATED RDW RBC AUTO: 46 FL (ref 37–54)
DEPRECATED RDW RBC AUTO: 46 FL (ref 37–54)
DEPRECATED RDW RBC AUTO: 46.3 FL (ref 37–54)
DEPRECATED RDW RBC AUTO: 46.5 FL (ref 37–54)
DEPRECATED RDW RBC AUTO: 46.5 FL (ref 37–54)
DEPRECATED RDW RBC AUTO: 46.6 FL (ref 37–54)
DEPRECATED RDW RBC AUTO: 47.4 FL (ref 37–54)
DEPRECATED RDW RBC AUTO: 48.3 FL (ref 37–54)
DEPRECATED RDW RBC AUTO: 48.3 FL (ref 37–54)
DEPRECATED RDW RBC AUTO: 48.5 FL (ref 37–54)
DEPRECATED RDW RBC AUTO: 48.7 FL (ref 37–54)
DEPRECATED RDW RBC AUTO: 48.9 FL (ref 37–54)
DEPRECATED RDW RBC AUTO: 48.9 FL (ref 37–54)
DEPRECATED RDW RBC AUTO: 49 FL (ref 37–54)
DEPRECATED RDW RBC AUTO: 49.4 FL (ref 37–54)
DEPRECATED RDW RBC AUTO: 49.6 FL (ref 37–54)
DEPRECATED RDW RBC AUTO: 49.6 FL (ref 37–54)
DEPRECATED RDW RBC AUTO: 50.8 FL (ref 37–54)
DEPRECATED RDW RBC AUTO: 51 FL (ref 37–54)
DEPRECATED RDW RBC AUTO: 51.7 FL (ref 37–54)
DEPRECATED RDW RBC AUTO: 52.2 FL (ref 37–54)
DEPRECATED RDW RBC AUTO: 52.2 FL (ref 37–54)
DEPRECATED RDW RBC AUTO: 52.5 FL (ref 37–54)
DEPRECATED RDW RBC AUTO: 52.6 FL (ref 37–54)
DEPRECATED RDW RBC AUTO: 52.7 FL (ref 37–54)
DEPRECATED RDW RBC AUTO: 53 FL (ref 37–54)
DEPRECATED RDW RBC AUTO: 53.7 FL (ref 37–54)
DEPRECATED RDW RBC AUTO: 54.4 FL (ref 37–54)
DEPRECATED RDW RBC AUTO: 54.5 FL (ref 37–54)
DEPRECATED RDW RBC AUTO: 54.9 FL (ref 37–54)
DEPRECATED RDW RBC AUTO: 55.4 FL (ref 37–54)
DEPRECATED RDW RBC AUTO: 55.5 FL (ref 37–54)
DEPRECATED RDW RBC AUTO: 55.6 FL (ref 37–54)
DEPRECATED RDW RBC AUTO: 55.7 FL (ref 37–54)
DEPRECATED RDW RBC AUTO: 55.8 FL (ref 37–54)
DEPRECATED RDW RBC AUTO: 56.4 FL (ref 37–54)
DEPRECATED RDW RBC AUTO: 57.8 FL (ref 37–54)
DIST SFA PSV LEFT: -27.7 CM/SEC
EOSINOPHIL # BLD AUTO: 0.01 10*3/MM3 (ref 0–0.4)
EOSINOPHIL # BLD AUTO: 0.02 10*3/MM3 (ref 0–0.4)
EOSINOPHIL # BLD AUTO: 0.05 10*3/MM3 (ref 0–0.4)
EOSINOPHIL # BLD AUTO: 0.17 10*3/MM3 (ref 0–0.4)
EOSINOPHIL # BLD AUTO: 0.22 10*3/MM3 (ref 0–0.4)
EOSINOPHIL # BLD AUTO: 0.27 10*3/MM3 (ref 0–0.4)
EOSINOPHIL # BLD AUTO: 0.27 10*3/MM3 (ref 0–0.4)
EOSINOPHIL # BLD AUTO: 0.29 10*3/MM3 (ref 0–0.4)
EOSINOPHIL # BLD AUTO: 0.3 10*3/MM3 (ref 0–0.4)
EOSINOPHIL # BLD AUTO: 0.31 10*3/MM3 (ref 0–0.4)
EOSINOPHIL # BLD AUTO: 0.32 10*3/MM3 (ref 0–0.4)
EOSINOPHIL # BLD AUTO: 0.34 10*3/MM3 (ref 0–0.4)
EOSINOPHIL # BLD AUTO: 0.34 10*3/MM3 (ref 0–0.4)
EOSINOPHIL # BLD AUTO: 0.35 10*3/MM3 (ref 0–0.4)
EOSINOPHIL # BLD AUTO: 0.39 10*3/MM3 (ref 0–0.4)
EOSINOPHIL # BLD AUTO: 0.4 10*3/MM3 (ref 0–0.4)
EOSINOPHIL # BLD AUTO: 0.42 10*3/MM3 (ref 0–0.4)
EOSINOPHIL # BLD AUTO: 0.44 10*3/MM3 (ref 0–0.4)
EOSINOPHIL # BLD AUTO: 0.47 10*3/MM3 (ref 0–0.4)
EOSINOPHIL # BLD AUTO: 0.47 10*3/MM3 (ref 0–0.4)
EOSINOPHIL # BLD AUTO: 0.53 10*3/MM3 (ref 0–0.4)
EOSINOPHIL # BLD AUTO: 0.54 10*3/MM3 (ref 0–0.4)
EOSINOPHIL # BLD AUTO: 0.6 10*3/MM3 (ref 0–0.4)
EOSINOPHIL # BLD AUTO: 0.62 10*3/MM3 (ref 0–0.4)
EOSINOPHIL # BLD AUTO: 0.66 10*3/MM3 (ref 0–0.4)
EOSINOPHIL # BLD AUTO: 1.02 10*3/MM3 (ref 0–0.4)
EOSINOPHIL # BLD MANUAL: 0.26 10*3/MM3 (ref 0–0.4)
EOSINOPHIL # BLD MANUAL: 0.35 10*3/MM3 (ref 0–0.4)
EOSINOPHIL NFR BLD AUTO: 0 % (ref 0.3–6.2)
EOSINOPHIL NFR BLD AUTO: 0.1 % (ref 0.3–6.2)
EOSINOPHIL NFR BLD AUTO: 0.1 % (ref 0.3–6.2)
EOSINOPHIL NFR BLD AUTO: 0.4 % (ref 0.3–6.2)
EOSINOPHIL NFR BLD AUTO: 0.5 % (ref 0.3–6.2)
EOSINOPHIL NFR BLD AUTO: 1.2 % (ref 0.3–6.2)
EOSINOPHIL NFR BLD AUTO: 1.3 % (ref 0.3–6.2)
EOSINOPHIL NFR BLD AUTO: 1.8 % (ref 0.3–6.2)
EOSINOPHIL NFR BLD AUTO: 10.9 % (ref 0.3–6.2)
EOSINOPHIL NFR BLD AUTO: 2.1 % (ref 0.3–6.2)
EOSINOPHIL NFR BLD AUTO: 2.5 % (ref 0.3–6.2)
EOSINOPHIL NFR BLD AUTO: 2.6 % (ref 0.3–6.2)
EOSINOPHIL NFR BLD AUTO: 3 % (ref 0.3–6.2)
EOSINOPHIL NFR BLD AUTO: 3 % (ref 0.3–6.2)
EOSINOPHIL NFR BLD AUTO: 3.4 % (ref 0.3–6.2)
EOSINOPHIL NFR BLD AUTO: 3.6 % (ref 0.3–6.2)
EOSINOPHIL NFR BLD AUTO: 3.7 % (ref 0.3–6.2)
EOSINOPHIL NFR BLD AUTO: 3.8 % (ref 0.3–6.2)
EOSINOPHIL NFR BLD AUTO: 3.9 % (ref 0.3–6.2)
EOSINOPHIL NFR BLD AUTO: 3.9 % (ref 0.3–6.2)
EOSINOPHIL NFR BLD AUTO: 4.3 % (ref 0.3–6.2)
EOSINOPHIL NFR BLD AUTO: 4.3 % (ref 0.3–6.2)
EOSINOPHIL NFR BLD AUTO: 4.4 % (ref 0.3–6.2)
EOSINOPHIL NFR BLD AUTO: 4.6 % (ref 0.3–6.2)
EOSINOPHIL NFR BLD AUTO: 4.7 % (ref 0.3–6.2)
EOSINOPHIL NFR BLD AUTO: 4.7 % (ref 0.3–6.2)
EOSINOPHIL NFR BLD AUTO: 4.8 % (ref 0.3–6.2)
EOSINOPHIL NFR BLD AUTO: 5.5 % (ref 0.3–6.2)
EOSINOPHIL NFR BLD MANUAL: 2 % (ref 0.3–6.2)
EOSINOPHIL NFR BLD MANUAL: 3 % (ref 0.3–6.2)
EPAP: 0
ERYTHROCYTE [DISTWIDTH] IN BLOOD BY AUTOMATED COUNT: 13.3 % (ref 12.3–15.4)
ERYTHROCYTE [DISTWIDTH] IN BLOOD BY AUTOMATED COUNT: 13.5 % (ref 12.3–15.4)
ERYTHROCYTE [DISTWIDTH] IN BLOOD BY AUTOMATED COUNT: 13.6 % (ref 12.3–15.4)
ERYTHROCYTE [DISTWIDTH] IN BLOOD BY AUTOMATED COUNT: 14.2 % (ref 12.3–15.4)
ERYTHROCYTE [DISTWIDTH] IN BLOOD BY AUTOMATED COUNT: 14.3 % (ref 12.3–15.4)
ERYTHROCYTE [DISTWIDTH] IN BLOOD BY AUTOMATED COUNT: 14.3 % (ref 12.3–15.4)
ERYTHROCYTE [DISTWIDTH] IN BLOOD BY AUTOMATED COUNT: 14.6 % (ref 12.3–15.4)
ERYTHROCYTE [DISTWIDTH] IN BLOOD BY AUTOMATED COUNT: 14.7 % (ref 12.3–15.4)
ERYTHROCYTE [DISTWIDTH] IN BLOOD BY AUTOMATED COUNT: 14.8 % (ref 12.3–15.4)
ERYTHROCYTE [DISTWIDTH] IN BLOOD BY AUTOMATED COUNT: 14.9 % (ref 12.3–15.4)
ERYTHROCYTE [DISTWIDTH] IN BLOOD BY AUTOMATED COUNT: 14.9 % (ref 12.3–15.4)
ERYTHROCYTE [DISTWIDTH] IN BLOOD BY AUTOMATED COUNT: 15 % (ref 12.3–15.4)
ERYTHROCYTE [DISTWIDTH] IN BLOOD BY AUTOMATED COUNT: 15.3 % (ref 12.3–15.4)
ERYTHROCYTE [DISTWIDTH] IN BLOOD BY AUTOMATED COUNT: 15.6 % (ref 12.3–15.4)
ERYTHROCYTE [DISTWIDTH] IN BLOOD BY AUTOMATED COUNT: 15.9 % (ref 12.3–15.4)
ERYTHROCYTE [DISTWIDTH] IN BLOOD BY AUTOMATED COUNT: 16.1 % (ref 12.3–15.4)
ERYTHROCYTE [DISTWIDTH] IN BLOOD BY AUTOMATED COUNT: 16.2 % (ref 12.3–15.4)
ERYTHROCYTE [DISTWIDTH] IN BLOOD BY AUTOMATED COUNT: 16.3 % (ref 12.3–15.4)
ERYTHROCYTE [DISTWIDTH] IN BLOOD BY AUTOMATED COUNT: 16.3 % (ref 12.3–15.4)
ERYTHROCYTE [DISTWIDTH] IN BLOOD BY AUTOMATED COUNT: 16.4 % (ref 12.3–15.4)
ERYTHROCYTE [DISTWIDTH] IN BLOOD BY AUTOMATED COUNT: 16.4 % (ref 12.3–15.4)
ERYTHROCYTE [DISTWIDTH] IN BLOOD BY AUTOMATED COUNT: 16.5 % (ref 12.3–15.4)
ERYTHROCYTE [DISTWIDTH] IN BLOOD BY AUTOMATED COUNT: 16.6 % (ref 12.3–15.4)
ERYTHROCYTE [DISTWIDTH] IN BLOOD BY AUTOMATED COUNT: 16.8 % (ref 12.3–15.4)
ERYTHROCYTE [DISTWIDTH] IN BLOOD BY AUTOMATED COUNT: 16.8 % (ref 12.3–15.4)
ERYTHROCYTE [DISTWIDTH] IN BLOOD BY AUTOMATED COUNT: 17 % (ref 12.3–15.4)
ERYTHROCYTE [DISTWIDTH] IN BLOOD BY AUTOMATED COUNT: 17.1 % (ref 12.3–15.4)
ERYTHROCYTE [DISTWIDTH] IN BLOOD BY AUTOMATED COUNT: 17.2 % (ref 12.3–15.4)
ERYTHROCYTE [DISTWIDTH] IN BLOOD BY AUTOMATED COUNT: 17.3 % (ref 12.3–15.4)
ERYTHROCYTE [DISTWIDTH] IN BLOOD BY AUTOMATED COUNT: 17.5 % (ref 12.3–15.4)
ERYTHROCYTE [DISTWIDTH] IN BLOOD BY AUTOMATED COUNT: 17.6 % (ref 12.3–15.4)
ERYTHROCYTE [DISTWIDTH] IN BLOOD BY AUTOMATED COUNT: 17.7 % (ref 12.3–15.4)
ERYTHROCYTE [DISTWIDTH] IN BLOOD BY AUTOMATED COUNT: 17.8 % (ref 12.3–15.4)
ERYTHROCYTE [DISTWIDTH] IN BLOOD BY AUTOMATED COUNT: 18 % (ref 12.3–15.4)
ERYTHROCYTE [DISTWIDTH] IN BLOOD BY AUTOMATED COUNT: 18.1 % (ref 12.3–15.4)
ERYTHROCYTE [DISTWIDTH] IN BLOOD BY AUTOMATED COUNT: 18.2 % (ref 12.3–15.4)
ERYTHROCYTE [DISTWIDTH] IN BLOOD BY AUTOMATED COUNT: 18.3 % (ref 12.3–15.4)
ERYTHROCYTE [DISTWIDTH] IN BLOOD BY AUTOMATED COUNT: 18.4 % (ref 12.3–15.4)
ERYTHROCYTE [SEDIMENTATION RATE] IN BLOOD: 75 MM/HR (ref 0–20)
ERYTHROCYTE [SEDIMENTATION RATE] IN BLOOD: 96 MM/HR (ref 0–20)
FERRITIN SERPL-MCNC: 287.6 NG/ML (ref 30–400)
FLUAV H1 2009 PAND RNA NPH QL NAA+PROBE: NOT DETECTED
FLUAV H1 HA GENE NPH QL NAA+PROBE: NOT DETECTED
FLUAV H3 RNA NPH QL NAA+PROBE: NOT DETECTED
FLUAV RNA RESP QL NAA+PROBE: NOT DETECTED
FLUAV SUBTYP SPEC NAA+PROBE: NOT DETECTED
FLUBV RNA ISLT QL NAA+PROBE: NOT DETECTED
FLUBV RNA RESP QL NAA+PROBE: NOT DETECTED
FOLATE SERPL-MCNC: 3.99 NG/ML (ref 4.78–24.2)
FUNGUS WND CULT: ABNORMAL
FUNGUS WND CULT: NORMAL
GFR SERPL CREATININE-BSD FRML MDRD: 102 ML/MIN/1.73
GFR SERPL CREATININE-BSD FRML MDRD: 103 ML/MIN/1.73
GFR SERPL CREATININE-BSD FRML MDRD: 105 ML/MIN/1.73
GFR SERPL CREATININE-BSD FRML MDRD: 106 ML/MIN/1.73
GFR SERPL CREATININE-BSD FRML MDRD: 112 ML/MIN/1.73
GFR SERPL CREATININE-BSD FRML MDRD: 113 ML/MIN/1.73
GFR SERPL CREATININE-BSD FRML MDRD: 115 ML/MIN/1.73
GFR SERPL CREATININE-BSD FRML MDRD: 123 ML/MIN/1.73
GFR SERPL CREATININE-BSD FRML MDRD: 125 ML/MIN/1.73
GFR SERPL CREATININE-BSD FRML MDRD: 127 ML/MIN/1.73
GFR SERPL CREATININE-BSD FRML MDRD: 131 ML/MIN/1.73
GFR SERPL CREATININE-BSD FRML MDRD: 134 ML/MIN/1.73
GFR SERPL CREATININE-BSD FRML MDRD: 137 ML/MIN/1.73
GFR SERPL CREATININE-BSD FRML MDRD: 141 ML/MIN/1.73
GFR SERPL CREATININE-BSD FRML MDRD: 142 ML/MIN/1.73
GFR SERPL CREATININE-BSD FRML MDRD: 144 ML/MIN/1.73
GFR SERPL CREATININE-BSD FRML MDRD: 147 ML/MIN/1.73
GFR SERPL CREATININE-BSD FRML MDRD: 148 ML/MIN/1.73
GFR SERPL CREATININE-BSD FRML MDRD: 75 ML/MIN/1.73
GFR SERPL CREATININE-BSD FRML MDRD: 94 ML/MIN/1.73
GFR SERPL CREATININE-BSD FRML MDRD: 97 ML/MIN/1.73
GFR SERPL CREATININE-BSD FRML MDRD: 98 ML/MIN/1.73
GFR SERPL CREATININE-BSD FRML MDRD: >150 ML/MIN/1.73
GIE STN SPEC: NORMAL
GLOBULIN UR ELPH-MCNC: 3.4 GM/DL
GLOBULIN UR ELPH-MCNC: 3.4 GM/DL
GLOBULIN UR ELPH-MCNC: 3.5 GM/DL
GLOBULIN UR ELPH-MCNC: 3.5 GM/DL
GLOBULIN UR ELPH-MCNC: 3.6 GM/DL
GLOBULIN UR ELPH-MCNC: 3.6 GM/DL
GLOBULIN UR ELPH-MCNC: 3.7 GM/DL
GLOBULIN UR ELPH-MCNC: 3.7 GM/DL
GLOBULIN UR ELPH-MCNC: 3.8 GM/DL
GLOBULIN UR ELPH-MCNC: 3.9 GM/DL
GLOBULIN UR ELPH-MCNC: 3.9 GM/DL
GLOBULIN UR ELPH-MCNC: 4 GM/DL
GLOBULIN UR ELPH-MCNC: 4.1 GM/DL
GLOBULIN UR ELPH-MCNC: 4.2 GM/DL
GLOBULIN UR ELPH-MCNC: 4.8 GM/DL
GLUCOSE BLD-MCNC: 125 MG/DL (ref 65–99)
GLUCOSE BLD-MCNC: 130 MG/DL (ref 65–99)
GLUCOSE BLD-MCNC: 148 MG/DL (ref 65–99)
GLUCOSE BLD-MCNC: 148 MG/DL (ref 65–99)
GLUCOSE BLD-MCNC: 149 MG/DL (ref 65–99)
GLUCOSE BLD-MCNC: 149 MG/DL (ref 65–99)
GLUCOSE BLD-MCNC: 153 MG/DL (ref 65–99)
GLUCOSE BLD-MCNC: 167 MG/DL (ref 65–99)
GLUCOSE BLD-MCNC: 171 MG/DL (ref 65–99)
GLUCOSE BLD-MCNC: 171 MG/DL (ref 65–99)
GLUCOSE BLD-MCNC: 175 MG/DL (ref 65–99)
GLUCOSE BLD-MCNC: 199 MG/DL (ref 65–99)
GLUCOSE BLD-MCNC: 214 MG/DL (ref 65–99)
GLUCOSE BLD-MCNC: 227 MG/DL (ref 65–99)
GLUCOSE BLD-MCNC: 233 MG/DL (ref 65–99)
GLUCOSE BLD-MCNC: 359 MG/DL (ref 65–99)
GLUCOSE BLD-MCNC: 399 MG/DL (ref 65–99)
GLUCOSE BLD-MCNC: 428 MG/DL (ref 65–99)
GLUCOSE BLD-MCNC: 459 MG/DL (ref 65–99)
GLUCOSE BLD-MCNC: 490 MG/DL (ref 65–99)
GLUCOSE BLD-MCNC: 78 MG/DL (ref 65–99)
GLUCOSE BLD-MCNC: 85 MG/DL (ref 65–99)
GLUCOSE BLD-MCNC: 88 MG/DL (ref 65–99)
GLUCOSE BLD-MCNC: 98 MG/DL (ref 65–99)
GLUCOSE BLDC GLUCOMTR-MCNC: 100 MG/DL (ref 70–130)
GLUCOSE BLDC GLUCOMTR-MCNC: 101 MG/DL (ref 70–130)
GLUCOSE BLDC GLUCOMTR-MCNC: 101 MG/DL (ref 70–130)
GLUCOSE BLDC GLUCOMTR-MCNC: 104 MG/DL (ref 70–130)
GLUCOSE BLDC GLUCOMTR-MCNC: 104 MG/DL (ref 70–130)
GLUCOSE BLDC GLUCOMTR-MCNC: 105 MG/DL (ref 70–130)
GLUCOSE BLDC GLUCOMTR-MCNC: 107 MG/DL (ref 70–130)
GLUCOSE BLDC GLUCOMTR-MCNC: 107 MG/DL (ref 70–130)
GLUCOSE BLDC GLUCOMTR-MCNC: 110 MG/DL (ref 70–130)
GLUCOSE BLDC GLUCOMTR-MCNC: 110 MG/DL (ref 70–130)
GLUCOSE BLDC GLUCOMTR-MCNC: 111 MG/DL (ref 70–130)
GLUCOSE BLDC GLUCOMTR-MCNC: 111 MG/DL (ref 70–130)
GLUCOSE BLDC GLUCOMTR-MCNC: 112 MG/DL (ref 70–130)
GLUCOSE BLDC GLUCOMTR-MCNC: 112 MG/DL (ref 70–130)
GLUCOSE BLDC GLUCOMTR-MCNC: 114 MG/DL (ref 70–130)
GLUCOSE BLDC GLUCOMTR-MCNC: 114 MG/DL (ref 70–130)
GLUCOSE BLDC GLUCOMTR-MCNC: 116 MG/DL (ref 70–130)
GLUCOSE BLDC GLUCOMTR-MCNC: 116 MG/DL (ref 70–130)
GLUCOSE BLDC GLUCOMTR-MCNC: 118 MG/DL (ref 70–130)
GLUCOSE BLDC GLUCOMTR-MCNC: 119 MG/DL (ref 70–130)
GLUCOSE BLDC GLUCOMTR-MCNC: 120 MG/DL (ref 70–130)
GLUCOSE BLDC GLUCOMTR-MCNC: 120 MG/DL (ref 70–130)
GLUCOSE BLDC GLUCOMTR-MCNC: 122 MG/DL (ref 70–130)
GLUCOSE BLDC GLUCOMTR-MCNC: 123 MG/DL (ref 70–130)
GLUCOSE BLDC GLUCOMTR-MCNC: 124 MG/DL (ref 70–130)
GLUCOSE BLDC GLUCOMTR-MCNC: 126 MG/DL (ref 70–130)
GLUCOSE BLDC GLUCOMTR-MCNC: 128 MG/DL (ref 70–130)
GLUCOSE BLDC GLUCOMTR-MCNC: 130 MG/DL (ref 70–130)
GLUCOSE BLDC GLUCOMTR-MCNC: 131 MG/DL (ref 70–130)
GLUCOSE BLDC GLUCOMTR-MCNC: 131 MG/DL (ref 70–130)
GLUCOSE BLDC GLUCOMTR-MCNC: 132 MG/DL (ref 70–130)
GLUCOSE BLDC GLUCOMTR-MCNC: 133 MG/DL (ref 70–130)
GLUCOSE BLDC GLUCOMTR-MCNC: 133 MG/DL (ref 70–130)
GLUCOSE BLDC GLUCOMTR-MCNC: 135 MG/DL (ref 70–130)
GLUCOSE BLDC GLUCOMTR-MCNC: 136 MG/DL (ref 70–130)
GLUCOSE BLDC GLUCOMTR-MCNC: 137 MG/DL (ref 70–130)
GLUCOSE BLDC GLUCOMTR-MCNC: 137 MG/DL (ref 70–130)
GLUCOSE BLDC GLUCOMTR-MCNC: 139 MG/DL (ref 70–130)
GLUCOSE BLDC GLUCOMTR-MCNC: 139 MG/DL (ref 70–130)
GLUCOSE BLDC GLUCOMTR-MCNC: 140 MG/DL (ref 70–130)
GLUCOSE BLDC GLUCOMTR-MCNC: 141 MG/DL (ref 70–130)
GLUCOSE BLDC GLUCOMTR-MCNC: 141 MG/DL (ref 70–130)
GLUCOSE BLDC GLUCOMTR-MCNC: 142 MG/DL (ref 70–130)
GLUCOSE BLDC GLUCOMTR-MCNC: 142 MG/DL (ref 70–130)
GLUCOSE BLDC GLUCOMTR-MCNC: 143 MG/DL (ref 70–130)
GLUCOSE BLDC GLUCOMTR-MCNC: 144 MG/DL (ref 70–130)
GLUCOSE BLDC GLUCOMTR-MCNC: 145 MG/DL (ref 70–130)
GLUCOSE BLDC GLUCOMTR-MCNC: 145 MG/DL (ref 70–130)
GLUCOSE BLDC GLUCOMTR-MCNC: 146 MG/DL (ref 70–130)
GLUCOSE BLDC GLUCOMTR-MCNC: 146 MG/DL (ref 70–130)
GLUCOSE BLDC GLUCOMTR-MCNC: 147 MG/DL (ref 70–130)
GLUCOSE BLDC GLUCOMTR-MCNC: 148 MG/DL (ref 70–130)
GLUCOSE BLDC GLUCOMTR-MCNC: 149 MG/DL (ref 70–130)
GLUCOSE BLDC GLUCOMTR-MCNC: 150 MG/DL (ref 70–130)
GLUCOSE BLDC GLUCOMTR-MCNC: 151 MG/DL (ref 70–130)
GLUCOSE BLDC GLUCOMTR-MCNC: 151 MG/DL (ref 70–130)
GLUCOSE BLDC GLUCOMTR-MCNC: 152 MG/DL (ref 70–130)
GLUCOSE BLDC GLUCOMTR-MCNC: 152 MG/DL (ref 70–130)
GLUCOSE BLDC GLUCOMTR-MCNC: 153 MG/DL (ref 70–130)
GLUCOSE BLDC GLUCOMTR-MCNC: 154 MG/DL (ref 70–130)
GLUCOSE BLDC GLUCOMTR-MCNC: 155 MG/DL (ref 70–130)
GLUCOSE BLDC GLUCOMTR-MCNC: 157 MG/DL (ref 70–130)
GLUCOSE BLDC GLUCOMTR-MCNC: 158 MG/DL (ref 70–130)
GLUCOSE BLDC GLUCOMTR-MCNC: 158 MG/DL (ref 70–130)
GLUCOSE BLDC GLUCOMTR-MCNC: 159 MG/DL (ref 70–130)
GLUCOSE BLDC GLUCOMTR-MCNC: 159 MG/DL (ref 70–130)
GLUCOSE BLDC GLUCOMTR-MCNC: 160 MG/DL (ref 70–130)
GLUCOSE BLDC GLUCOMTR-MCNC: 162 MG/DL (ref 70–130)
GLUCOSE BLDC GLUCOMTR-MCNC: 163 MG/DL (ref 70–130)
GLUCOSE BLDC GLUCOMTR-MCNC: 164 MG/DL (ref 70–130)
GLUCOSE BLDC GLUCOMTR-MCNC: 164 MG/DL (ref 70–130)
GLUCOSE BLDC GLUCOMTR-MCNC: 165 MG/DL (ref 70–130)
GLUCOSE BLDC GLUCOMTR-MCNC: 165 MG/DL (ref 70–130)
GLUCOSE BLDC GLUCOMTR-MCNC: 166 MG/DL (ref 70–130)
GLUCOSE BLDC GLUCOMTR-MCNC: 167 MG/DL (ref 70–130)
GLUCOSE BLDC GLUCOMTR-MCNC: 168 MG/DL (ref 70–130)
GLUCOSE BLDC GLUCOMTR-MCNC: 169 MG/DL (ref 70–130)
GLUCOSE BLDC GLUCOMTR-MCNC: 171 MG/DL (ref 70–130)
GLUCOSE BLDC GLUCOMTR-MCNC: 172 MG/DL (ref 70–130)
GLUCOSE BLDC GLUCOMTR-MCNC: 173 MG/DL (ref 70–130)
GLUCOSE BLDC GLUCOMTR-MCNC: 174 MG/DL (ref 70–130)
GLUCOSE BLDC GLUCOMTR-MCNC: 174 MG/DL (ref 70–130)
GLUCOSE BLDC GLUCOMTR-MCNC: 175 MG/DL (ref 70–130)
GLUCOSE BLDC GLUCOMTR-MCNC: 175 MG/DL (ref 70–130)
GLUCOSE BLDC GLUCOMTR-MCNC: 176 MG/DL (ref 70–130)
GLUCOSE BLDC GLUCOMTR-MCNC: 177 MG/DL (ref 70–130)
GLUCOSE BLDC GLUCOMTR-MCNC: 178 MG/DL (ref 70–130)
GLUCOSE BLDC GLUCOMTR-MCNC: 179 MG/DL (ref 70–130)
GLUCOSE BLDC GLUCOMTR-MCNC: 179 MG/DL (ref 70–130)
GLUCOSE BLDC GLUCOMTR-MCNC: 180 MG/DL (ref 70–130)
GLUCOSE BLDC GLUCOMTR-MCNC: 181 MG/DL (ref 70–130)
GLUCOSE BLDC GLUCOMTR-MCNC: 182 MG/DL (ref 70–130)
GLUCOSE BLDC GLUCOMTR-MCNC: 183 MG/DL (ref 70–130)
GLUCOSE BLDC GLUCOMTR-MCNC: 184 MG/DL (ref 70–130)
GLUCOSE BLDC GLUCOMTR-MCNC: 185 MG/DL (ref 70–130)
GLUCOSE BLDC GLUCOMTR-MCNC: 185 MG/DL (ref 70–130)
GLUCOSE BLDC GLUCOMTR-MCNC: 186 MG/DL (ref 70–130)
GLUCOSE BLDC GLUCOMTR-MCNC: 188 MG/DL (ref 70–130)
GLUCOSE BLDC GLUCOMTR-MCNC: 188 MG/DL (ref 70–130)
GLUCOSE BLDC GLUCOMTR-MCNC: 189 MG/DL (ref 70–130)
GLUCOSE BLDC GLUCOMTR-MCNC: 190 MG/DL (ref 70–130)
GLUCOSE BLDC GLUCOMTR-MCNC: 191 MG/DL (ref 70–130)
GLUCOSE BLDC GLUCOMTR-MCNC: 191 MG/DL (ref 70–130)
GLUCOSE BLDC GLUCOMTR-MCNC: 192 MG/DL (ref 70–130)
GLUCOSE BLDC GLUCOMTR-MCNC: 193 MG/DL (ref 70–130)
GLUCOSE BLDC GLUCOMTR-MCNC: 196 MG/DL (ref 70–130)
GLUCOSE BLDC GLUCOMTR-MCNC: 197 MG/DL (ref 70–130)
GLUCOSE BLDC GLUCOMTR-MCNC: 198 MG/DL (ref 70–130)
GLUCOSE BLDC GLUCOMTR-MCNC: 198 MG/DL (ref 70–130)
GLUCOSE BLDC GLUCOMTR-MCNC: 200 MG/DL (ref 70–130)
GLUCOSE BLDC GLUCOMTR-MCNC: 210 MG/DL (ref 70–130)
GLUCOSE BLDC GLUCOMTR-MCNC: 213 MG/DL (ref 70–130)
GLUCOSE BLDC GLUCOMTR-MCNC: 224 MG/DL (ref 70–130)
GLUCOSE BLDC GLUCOMTR-MCNC: 226 MG/DL (ref 70–130)
GLUCOSE BLDC GLUCOMTR-MCNC: 228 MG/DL (ref 70–130)
GLUCOSE BLDC GLUCOMTR-MCNC: 240 MG/DL (ref 70–130)
GLUCOSE BLDC GLUCOMTR-MCNC: 248 MG/DL (ref 70–130)
GLUCOSE BLDC GLUCOMTR-MCNC: 254 MG/DL (ref 70–130)
GLUCOSE BLDC GLUCOMTR-MCNC: 255 MG/DL (ref 70–130)
GLUCOSE BLDC GLUCOMTR-MCNC: 258 MG/DL (ref 70–130)
GLUCOSE BLDC GLUCOMTR-MCNC: 259 MG/DL (ref 70–130)
GLUCOSE BLDC GLUCOMTR-MCNC: 265 MG/DL (ref 70–130)
GLUCOSE BLDC GLUCOMTR-MCNC: 266 MG/DL (ref 70–130)
GLUCOSE BLDC GLUCOMTR-MCNC: 267 MG/DL (ref 70–130)
GLUCOSE BLDC GLUCOMTR-MCNC: 289 MG/DL (ref 70–130)
GLUCOSE BLDC GLUCOMTR-MCNC: 301 MG/DL (ref 70–130)
GLUCOSE BLDC GLUCOMTR-MCNC: 322 MG/DL (ref 70–130)
GLUCOSE BLDC GLUCOMTR-MCNC: 346 MG/DL (ref 70–130)
GLUCOSE BLDC GLUCOMTR-MCNC: 355 MG/DL (ref 70–130)
GLUCOSE BLDC GLUCOMTR-MCNC: 373 MG/DL (ref 70–130)
GLUCOSE BLDC GLUCOMTR-MCNC: 402 MG/DL (ref 70–130)
GLUCOSE BLDC GLUCOMTR-MCNC: 419 MG/DL (ref 70–130)
GLUCOSE BLDC GLUCOMTR-MCNC: 426 MG/DL (ref 70–130)
GLUCOSE BLDC GLUCOMTR-MCNC: 501 MG/DL (ref 70–130)
GLUCOSE BLDC GLUCOMTR-MCNC: 65 MG/DL (ref 70–130)
GLUCOSE BLDC GLUCOMTR-MCNC: 67 MG/DL (ref 70–130)
GLUCOSE BLDC GLUCOMTR-MCNC: 68 MG/DL (ref 70–130)
GLUCOSE BLDC GLUCOMTR-MCNC: 69 MG/DL (ref 70–130)
GLUCOSE BLDC GLUCOMTR-MCNC: 69 MG/DL (ref 70–130)
GLUCOSE BLDC GLUCOMTR-MCNC: 70 MG/DL (ref 70–130)
GLUCOSE BLDC GLUCOMTR-MCNC: 71 MG/DL (ref 70–130)
GLUCOSE BLDC GLUCOMTR-MCNC: 72 MG/DL (ref 70–130)
GLUCOSE BLDC GLUCOMTR-MCNC: 73 MG/DL (ref 70–130)
GLUCOSE BLDC GLUCOMTR-MCNC: 74 MG/DL (ref 70–130)
GLUCOSE BLDC GLUCOMTR-MCNC: 75 MG/DL (ref 70–130)
GLUCOSE BLDC GLUCOMTR-MCNC: 76 MG/DL (ref 70–130)
GLUCOSE BLDC GLUCOMTR-MCNC: 77 MG/DL (ref 70–130)
GLUCOSE BLDC GLUCOMTR-MCNC: 78 MG/DL (ref 70–130)
GLUCOSE BLDC GLUCOMTR-MCNC: 79 MG/DL (ref 70–130)
GLUCOSE BLDC GLUCOMTR-MCNC: 83 MG/DL (ref 70–130)
GLUCOSE BLDC GLUCOMTR-MCNC: 85 MG/DL (ref 70–130)
GLUCOSE BLDC GLUCOMTR-MCNC: 85 MG/DL (ref 70–130)
GLUCOSE BLDC GLUCOMTR-MCNC: 86 MG/DL (ref 70–130)
GLUCOSE BLDC GLUCOMTR-MCNC: 87 MG/DL (ref 70–130)
GLUCOSE BLDC GLUCOMTR-MCNC: 87 MG/DL (ref 70–130)
GLUCOSE BLDC GLUCOMTR-MCNC: 89 MG/DL (ref 70–130)
GLUCOSE BLDC GLUCOMTR-MCNC: 90 MG/DL (ref 70–130)
GLUCOSE BLDC GLUCOMTR-MCNC: 91 MG/DL (ref 70–130)
GLUCOSE BLDC GLUCOMTR-MCNC: 91 MG/DL (ref 70–130)
GLUCOSE BLDC GLUCOMTR-MCNC: 92 MG/DL (ref 70–130)
GLUCOSE BLDC GLUCOMTR-MCNC: 93 MG/DL (ref 70–130)
GLUCOSE BLDC GLUCOMTR-MCNC: 94 MG/DL (ref 70–130)
GLUCOSE BLDC GLUCOMTR-MCNC: 95 MG/DL (ref 70–130)
GLUCOSE BLDC GLUCOMTR-MCNC: 98 MG/DL (ref 70–130)
GLUCOSE BLDC GLUCOMTR-MCNC: 99 MG/DL (ref 70–130)
GLUCOSE BLDC GLUCOMTR-MCNC: 99 MG/DL (ref 70–130)
GLUCOSE FLD-MCNC: 145 MG/DL
GLUCOSE SERPL-MCNC: 102 MG/DL (ref 65–99)
GLUCOSE SERPL-MCNC: 104 MG/DL (ref 65–99)
GLUCOSE SERPL-MCNC: 106 MG/DL (ref 65–99)
GLUCOSE SERPL-MCNC: 113 MG/DL (ref 65–99)
GLUCOSE SERPL-MCNC: 114 MG/DL (ref 65–99)
GLUCOSE SERPL-MCNC: 114 MG/DL (ref 65–99)
GLUCOSE SERPL-MCNC: 120 MG/DL (ref 65–99)
GLUCOSE SERPL-MCNC: 124 MG/DL (ref 65–99)
GLUCOSE SERPL-MCNC: 126 MG/DL (ref 65–99)
GLUCOSE SERPL-MCNC: 129 MG/DL (ref 65–99)
GLUCOSE SERPL-MCNC: 129 MG/DL (ref 65–99)
GLUCOSE SERPL-MCNC: 132 MG/DL (ref 65–99)
GLUCOSE SERPL-MCNC: 132 MG/DL (ref 65–99)
GLUCOSE SERPL-MCNC: 135 MG/DL (ref 65–99)
GLUCOSE SERPL-MCNC: 138 MG/DL (ref 65–99)
GLUCOSE SERPL-MCNC: 140 MG/DL (ref 65–99)
GLUCOSE SERPL-MCNC: 141 MG/DL (ref 65–99)
GLUCOSE SERPL-MCNC: 145 MG/DL (ref 65–99)
GLUCOSE SERPL-MCNC: 146 MG/DL (ref 65–99)
GLUCOSE SERPL-MCNC: 149 MG/DL (ref 65–99)
GLUCOSE SERPL-MCNC: 154 MG/DL (ref 65–99)
GLUCOSE SERPL-MCNC: 157 MG/DL (ref 65–99)
GLUCOSE SERPL-MCNC: 163 MG/DL (ref 65–99)
GLUCOSE SERPL-MCNC: 169 MG/DL (ref 65–99)
GLUCOSE SERPL-MCNC: 176 MG/DL (ref 65–99)
GLUCOSE SERPL-MCNC: 185 MG/DL (ref 65–99)
GLUCOSE SERPL-MCNC: 185 MG/DL (ref 65–99)
GLUCOSE SERPL-MCNC: 216 MG/DL (ref 65–99)
GLUCOSE SERPL-MCNC: 226 MG/DL (ref 65–99)
GLUCOSE SERPL-MCNC: 74 MG/DL (ref 65–99)
GLUCOSE SERPL-MCNC: 77 MG/DL (ref 65–99)
GLUCOSE SERPL-MCNC: 79 MG/DL (ref 65–99)
GLUCOSE SERPL-MCNC: 85 MG/DL (ref 65–99)
GLUCOSE SERPL-MCNC: 87 MG/DL (ref 65–99)
GLUCOSE SERPL-MCNC: 99 MG/DL (ref 65–99)
GLUCOSE UR STRIP-MCNC: ABNORMAL MG/DL
GLUCOSE UR STRIP-MCNC: ABNORMAL MG/DL
GLUCOSE UR STRIP-MCNC: NEGATIVE MG/DL
GRAM STN SPEC: ABNORMAL
GRAM STN SPEC: NORMAL
HADV DNA SPEC NAA+PROBE: NOT DETECTED
HBA1C MFR BLD: 10 % (ref 4.8–5.6)
HBA1C MFR BLD: 16.6 % (ref 4.8–5.6)
HBA1C MFR BLD: 16.9 % (ref 4.8–5.6)
HBA1C MFR BLD: 6.2 % (ref 4.8–5.6)
HCO3 BLDA-SCNC: 23.2 MMOL/L (ref 20–26)
HCO3 BLDA-SCNC: 28.9 MMOL/L (ref 20–26)
HCO3 BLDA-SCNC: 29.7 MMOL/L (ref 20–26)
HCO3 BLDA-SCNC: 35.4 MMOL/L (ref 20–26)
HCO3 BLDV-SCNC: 22.4 MMOL/L (ref 22–28)
HCOV 229E RNA SPEC QL NAA+PROBE: NOT DETECTED
HCOV HKU1 RNA SPEC QL NAA+PROBE: NOT DETECTED
HCOV NL63 RNA SPEC QL NAA+PROBE: NOT DETECTED
HCOV OC43 RNA SPEC QL NAA+PROBE: NOT DETECTED
HCT VFR BLD AUTO: 25.9 % (ref 37.5–51)
HCT VFR BLD AUTO: 26.2 % (ref 37.5–51)
HCT VFR BLD AUTO: 26.4 % (ref 37.5–51)
HCT VFR BLD AUTO: 26.5 % (ref 37.5–51)
HCT VFR BLD AUTO: 26.5 % (ref 37.5–51)
HCT VFR BLD AUTO: 26.6 % (ref 37.5–51)
HCT VFR BLD AUTO: 26.6 % (ref 37.5–51)
HCT VFR BLD AUTO: 26.8 % (ref 37.5–51)
HCT VFR BLD AUTO: 26.8 % (ref 37.5–51)
HCT VFR BLD AUTO: 26.9 % (ref 37.5–51)
HCT VFR BLD AUTO: 27.2 % (ref 37.5–51)
HCT VFR BLD AUTO: 27.2 % (ref 37.5–51)
HCT VFR BLD AUTO: 27.6 % (ref 37.5–51)
HCT VFR BLD AUTO: 27.6 % (ref 37.5–51)
HCT VFR BLD AUTO: 27.7 % (ref 37.5–51)
HCT VFR BLD AUTO: 28 % (ref 37.5–51)
HCT VFR BLD AUTO: 28.2 % (ref 37.5–51)
HCT VFR BLD AUTO: 28.5 % (ref 37.5–51)
HCT VFR BLD AUTO: 28.5 % (ref 37.5–51)
HCT VFR BLD AUTO: 28.6 % (ref 37.5–51)
HCT VFR BLD AUTO: 28.6 % (ref 37.5–51)
HCT VFR BLD AUTO: 29.2 % (ref 37.5–51)
HCT VFR BLD AUTO: 29.7 % (ref 37.5–51)
HCT VFR BLD AUTO: 29.9 % (ref 37.5–51)
HCT VFR BLD AUTO: 30 % (ref 37.5–51)
HCT VFR BLD AUTO: 30.1 % (ref 37.5–51)
HCT VFR BLD AUTO: 30.3 % (ref 37.5–51)
HCT VFR BLD AUTO: 30.3 % (ref 37.5–51)
HCT VFR BLD AUTO: 30.4 % (ref 37.5–51)
HCT VFR BLD AUTO: 30.4 % (ref 37.5–51)
HCT VFR BLD AUTO: 30.5 % (ref 37.5–51)
HCT VFR BLD AUTO: 30.5 % (ref 37.5–51)
HCT VFR BLD AUTO: 30.8 % (ref 37.5–51)
HCT VFR BLD AUTO: 31.1 % (ref 37.5–51)
HCT VFR BLD AUTO: 31.3 % (ref 37.5–51)
HCT VFR BLD AUTO: 31.3 % (ref 37.5–51)
HCT VFR BLD AUTO: 31.5 % (ref 37.5–51)
HCT VFR BLD AUTO: 32 % (ref 37.5–51)
HCT VFR BLD AUTO: 32 % (ref 37.5–51)
HCT VFR BLD AUTO: 32.5 % (ref 37.5–51)
HCT VFR BLD AUTO: 32.6 % (ref 37.5–51)
HCT VFR BLD AUTO: 32.8 % (ref 37.5–51)
HCT VFR BLD AUTO: 32.8 % (ref 37.5–51)
HCT VFR BLD AUTO: 34.3 % (ref 37.5–51)
HCT VFR BLD AUTO: 34.6 % (ref 37.5–51)
HCT VFR BLD AUTO: 34.9 % (ref 37.5–51)
HCT VFR BLD AUTO: 35.9 % (ref 37.5–51)
HCT VFR BLD AUTO: 36.8 % (ref 37.5–51)
HCT VFR BLD AUTO: 37.1 % (ref 37.5–51)
HCT VFR BLD AUTO: 38.4 % (ref 37.5–51)
HCT VFR BLD CALC: 27.9 %
HCT VFR BLD CALC: 28.2 %
HCT VFR BLD CALC: 28.4 %
HCT VFR BLD CALC: 28.5 %
HDLC SERPL-MCNC: 19 MG/DL (ref 40–60)
HGB BLD-MCNC: 10.1 G/DL (ref 13–17.7)
HGB BLD-MCNC: 10.3 G/DL (ref 13–17.7)
HGB BLD-MCNC: 10.5 G/DL (ref 13–17.7)
HGB BLD-MCNC: 10.9 G/DL (ref 13–17.7)
HGB BLD-MCNC: 11.1 G/DL (ref 13–17.7)
HGB BLD-MCNC: 11.8 G/DL (ref 13–17.7)
HGB BLD-MCNC: 12 G/DL (ref 13–17.7)
HGB BLD-MCNC: 12 G/DL (ref 13–17.7)
HGB BLD-MCNC: 12.1 G/DL (ref 13–17.7)
HGB BLD-MCNC: 7.8 G/DL (ref 13–17.7)
HGB BLD-MCNC: 7.9 G/DL (ref 13–17.7)
HGB BLD-MCNC: 8 G/DL (ref 13–17.7)
HGB BLD-MCNC: 8.2 G/DL (ref 13–17.7)
HGB BLD-MCNC: 8.3 G/DL (ref 13–17.7)
HGB BLD-MCNC: 8.3 G/DL (ref 13–17.7)
HGB BLD-MCNC: 8.4 G/DL (ref 13–17.7)
HGB BLD-MCNC: 8.5 G/DL (ref 13–17.7)
HGB BLD-MCNC: 8.6 G/DL (ref 13–17.7)
HGB BLD-MCNC: 8.7 G/DL (ref 13–17.7)
HGB BLD-MCNC: 8.8 G/DL (ref 13–17.7)
HGB BLD-MCNC: 8.9 G/DL (ref 13–17.7)
HGB BLD-MCNC: 8.9 G/DL (ref 13–17.7)
HGB BLD-MCNC: 9.1 G/DL (ref 13–17.7)
HGB BLD-MCNC: 9.2 G/DL (ref 13–17.7)
HGB BLD-MCNC: 9.3 G/DL (ref 13–17.7)
HGB BLD-MCNC: 9.4 G/DL (ref 13–17.7)
HGB BLD-MCNC: 9.5 G/DL (ref 13–17.7)
HGB BLD-MCNC: 9.7 G/DL (ref 13–17.7)
HGB BLD-MCNC: 9.8 G/DL (ref 13–17.7)
HGB BLD-MCNC: 9.9 G/DL (ref 13–17.7)
HGB BLDA-MCNC: 12.8 G/DL (ref 13.5–17.5)
HGB BLDA-MCNC: 9.1 G/DL (ref 13.5–17.5)
HGB BLDA-MCNC: 9.2 G/DL (ref 13.5–17.5)
HGB BLDA-MCNC: 9.3 G/DL (ref 13.5–17.5)
HGB BLDA-MCNC: 9.3 G/DL (ref 13.5–17.5)
HGB UR QL STRIP.AUTO: ABNORMAL
HMPV RNA NPH QL NAA+NON-PROBE: NOT DETECTED
HOLD SPECIMEN: NORMAL
HPIV1 RNA SPEC QL NAA+PROBE: NOT DETECTED
HPIV2 RNA SPEC QL NAA+PROBE: NOT DETECTED
HPIV3 RNA NPH QL NAA+PROBE: NOT DETECTED
HPIV4 P GENE NPH QL NAA+PROBE: NOT DETECTED
HYALINE CASTS UR QL AUTO: ABNORMAL /LPF
HYPOCHROMIA BLD QL: ABNORMAL
HYPOCHROMIA BLD QL: NORMAL
IMM GRANULOCYTES # BLD AUTO: 0.01 10*3/MM3 (ref 0–0.05)
IMM GRANULOCYTES # BLD AUTO: 0.02 10*3/MM3 (ref 0–0.05)
IMM GRANULOCYTES # BLD AUTO: 0.02 10*3/MM3 (ref 0–0.05)
IMM GRANULOCYTES # BLD AUTO: 0.03 10*3/MM3 (ref 0–0.05)
IMM GRANULOCYTES # BLD AUTO: 0.04 10*3/MM3 (ref 0–0.05)
IMM GRANULOCYTES # BLD AUTO: 0.05 10*3/MM3 (ref 0–0.05)
IMM GRANULOCYTES # BLD AUTO: 0.05 10*3/MM3 (ref 0–0.05)
IMM GRANULOCYTES # BLD AUTO: 0.06 10*3/MM3 (ref 0–0.05)
IMM GRANULOCYTES # BLD AUTO: 0.07 10*3/MM3 (ref 0–0.05)
IMM GRANULOCYTES # BLD AUTO: 0.08 10*3/MM3 (ref 0–0.05)
IMM GRANULOCYTES # BLD AUTO: 0.08 10*3/MM3 (ref 0–0.05)
IMM GRANULOCYTES # BLD AUTO: 0.09 10*3/MM3 (ref 0–0.05)
IMM GRANULOCYTES # BLD AUTO: 0.1 10*3/MM3 (ref 0–0.05)
IMM GRANULOCYTES # BLD AUTO: 0.1 10*3/MM3 (ref 0–0.05)
IMM GRANULOCYTES # BLD AUTO: 0.15 10*3/MM3 (ref 0–0.05)
IMM GRANULOCYTES # BLD AUTO: 0.17 10*3/MM3 (ref 0–0.05)
IMM GRANULOCYTES # BLD AUTO: 0.21 10*3/MM3 (ref 0–0.05)
IMM GRANULOCYTES # BLD AUTO: 0.24 10*3/MM3 (ref 0–0.05)
IMM GRANULOCYTES # BLD AUTO: 0.37 10*3/MM3 (ref 0–0.05)
IMM GRANULOCYTES # BLD AUTO: 1.36 10*3/MM3 (ref 0–0.05)
IMM GRANULOCYTES NFR BLD AUTO: 0.1 % (ref 0–0.5)
IMM GRANULOCYTES NFR BLD AUTO: 0.2 % (ref 0–0.5)
IMM GRANULOCYTES NFR BLD AUTO: 0.2 % (ref 0–0.5)
IMM GRANULOCYTES NFR BLD AUTO: 0.3 % (ref 0–0.5)
IMM GRANULOCYTES NFR BLD AUTO: 0.3 % (ref 0–0.5)
IMM GRANULOCYTES NFR BLD AUTO: 0.4 % (ref 0–0.5)
IMM GRANULOCYTES NFR BLD AUTO: 0.5 % (ref 0–0.5)
IMM GRANULOCYTES NFR BLD AUTO: 0.6 % (ref 0–0.5)
IMM GRANULOCYTES NFR BLD AUTO: 0.7 % (ref 0–0.5)
IMM GRANULOCYTES NFR BLD AUTO: 0.7 % (ref 0–0.5)
IMM GRANULOCYTES NFR BLD AUTO: 0.8 % (ref 0–0.5)
IMM GRANULOCYTES NFR BLD AUTO: 1.1 % (ref 0–0.5)
IMM GRANULOCYTES NFR BLD AUTO: 1.1 % (ref 0–0.5)
IMM GRANULOCYTES NFR BLD AUTO: 1.3 % (ref 0–0.5)
IMM GRANULOCYTES NFR BLD AUTO: 3.4 % (ref 0–0.5)
INHALED O2 CONCENTRATION: 100 %
INHALED O2 CONCENTRATION: 21 %
INHALED O2 CONCENTRATION: 30 %
INHALED O2 CONCENTRATION: 31 %
INHALED O2 CONCENTRATION: 40 %
INR PPP: 1.07 (ref 0.85–1.16)
INR PPP: 1.1 (ref 0.85–1.16)
INR PPP: 1.16 (ref 0.85–1.16)
INR PPP: 1.34 (ref 0.85–1.16)
INR PPP: 1.38 (ref 0.85–1.16)
IPAP: 0
IRON 24H UR-MRATE: 21 MCG/DL (ref 59–158)
IRON SATN MFR SERPL: 11 % (ref 20–50)
KETONES UR QL STRIP: ABNORMAL
KETONES UR QL STRIP: NEGATIVE
LAB AP CASE REPORT: NORMAL
LAB AP CASE REPORT: NORMAL
LAB AP CLINICAL INFORMATION: NORMAL
LDH FLD-CCNC: 64 U/L
LDH SERPL-CCNC: 209 U/L (ref 135–225)
LDLC SERPL CALC-MCNC: 82 MG/DL (ref 0–100)
LDLC/HDLC SERPL: 4.18 {RATIO}
LEFT ATRIUM VOLUME: 52 ML
LEFT CFA PROX SYS PSV: 172.9 CM/SEC
LEUKOCYTE ESTERASE UR QL STRIP.AUTO: ABNORMAL
LIPASE SERPL-CCNC: 15 U/L (ref 13–60)
LIPASE SERPL-CCNC: 17 U/L (ref 13–60)
LV EF 2D ECHO EST: 50 %
LYMPHOCYTES # BLD AUTO: 1.21 10*3/MM3 (ref 0.7–3.1)
LYMPHOCYTES # BLD AUTO: 1.27 10*3/MM3 (ref 0.7–3.1)
LYMPHOCYTES # BLD AUTO: 1.28 10*3/MM3 (ref 0.7–3.1)
LYMPHOCYTES # BLD AUTO: 1.46 10*3/MM3 (ref 0.7–3.1)
LYMPHOCYTES # BLD AUTO: 1.51 10*3/MM3 (ref 0.7–3.1)
LYMPHOCYTES # BLD AUTO: 1.55 10*3/MM3 (ref 0.7–3.1)
LYMPHOCYTES # BLD AUTO: 1.7 10*3/MM3 (ref 0.7–3.1)
LYMPHOCYTES # BLD AUTO: 1.76 10*3/MM3 (ref 0.7–3.1)
LYMPHOCYTES # BLD AUTO: 1.77 10*3/MM3 (ref 0.7–3.1)
LYMPHOCYTES # BLD AUTO: 1.79 10*3/MM3 (ref 0.7–3.1)
LYMPHOCYTES # BLD AUTO: 1.87 10*3/MM3 (ref 0.7–3.1)
LYMPHOCYTES # BLD AUTO: 1.87 10*3/MM3 (ref 0.7–3.1)
LYMPHOCYTES # BLD AUTO: 1.9 10*3/MM3 (ref 0.7–3.1)
LYMPHOCYTES # BLD AUTO: 1.92 10*3/MM3 (ref 0.7–3.1)
LYMPHOCYTES # BLD AUTO: 1.99 10*3/MM3 (ref 0.7–3.1)
LYMPHOCYTES # BLD AUTO: 2.04 10*3/MM3 (ref 0.7–3.1)
LYMPHOCYTES # BLD AUTO: 2.04 10*3/MM3 (ref 0.7–3.1)
LYMPHOCYTES # BLD AUTO: 2.08 10*3/MM3 (ref 0.7–3.1)
LYMPHOCYTES # BLD AUTO: 2.31 10*3/MM3 (ref 0.7–3.1)
LYMPHOCYTES # BLD AUTO: 2.36 10*3/MM3 (ref 0.7–3.1)
LYMPHOCYTES # BLD AUTO: 2.39 10*3/MM3 (ref 0.7–3.1)
LYMPHOCYTES # BLD AUTO: 2.41 10*3/MM3 (ref 0.7–3.1)
LYMPHOCYTES # BLD AUTO: 2.49 10*3/MM3 (ref 0.7–3.1)
LYMPHOCYTES # BLD AUTO: 2.53 10*3/MM3 (ref 0.7–3.1)
LYMPHOCYTES # BLD AUTO: 2.59 10*3/MM3 (ref 0.7–3.1)
LYMPHOCYTES # BLD AUTO: 2.75 10*3/MM3 (ref 0.7–3.1)
LYMPHOCYTES # BLD AUTO: 3.2 10*3/MM3 (ref 0.7–3.1)
LYMPHOCYTES # BLD AUTO: 3.21 10*3/MM3 (ref 0.7–3.1)
LYMPHOCYTES # BLD MANUAL: 1.29 10*3/MM3 (ref 0.7–3.1)
LYMPHOCYTES # BLD MANUAL: 2.11 10*3/MM3 (ref 0.7–3.1)
LYMPHOCYTES NFR BLD AUTO: 11.6 % (ref 19.6–45.3)
LYMPHOCYTES NFR BLD AUTO: 13.2 % (ref 19.6–45.3)
LYMPHOCYTES NFR BLD AUTO: 13.6 % (ref 19.6–45.3)
LYMPHOCYTES NFR BLD AUTO: 14.1 % (ref 19.6–45.3)
LYMPHOCYTES NFR BLD AUTO: 14.5 % (ref 19.6–45.3)
LYMPHOCYTES NFR BLD AUTO: 14.6 % (ref 19.6–45.3)
LYMPHOCYTES NFR BLD AUTO: 16.6 % (ref 19.6–45.3)
LYMPHOCYTES NFR BLD AUTO: 17.6 % (ref 19.6–45.3)
LYMPHOCYTES NFR BLD AUTO: 17.6 % (ref 19.6–45.3)
LYMPHOCYTES NFR BLD AUTO: 17.7 % (ref 19.6–45.3)
LYMPHOCYTES NFR BLD AUTO: 18.1 % (ref 19.6–45.3)
LYMPHOCYTES NFR BLD AUTO: 18.1 % (ref 19.6–45.3)
LYMPHOCYTES NFR BLD AUTO: 18.3 % (ref 19.6–45.3)
LYMPHOCYTES NFR BLD AUTO: 18.5 % (ref 19.6–45.3)
LYMPHOCYTES NFR BLD AUTO: 18.6 % (ref 19.6–45.3)
LYMPHOCYTES NFR BLD AUTO: 19.2 % (ref 19.6–45.3)
LYMPHOCYTES NFR BLD AUTO: 19.5 % (ref 19.6–45.3)
LYMPHOCYTES NFR BLD AUTO: 24.1 % (ref 19.6–45.3)
LYMPHOCYTES NFR BLD AUTO: 24.6 % (ref 19.6–45.3)
LYMPHOCYTES NFR BLD AUTO: 26.5 % (ref 19.6–45.3)
LYMPHOCYTES NFR BLD AUTO: 28.6 % (ref 19.6–45.3)
LYMPHOCYTES NFR BLD AUTO: 29.4 % (ref 19.6–45.3)
LYMPHOCYTES NFR BLD AUTO: 3.2 % (ref 19.6–45.3)
LYMPHOCYTES NFR BLD AUTO: 30.2 % (ref 19.6–45.3)
LYMPHOCYTES NFR BLD AUTO: 6.3 % (ref 19.6–45.3)
LYMPHOCYTES NFR BLD AUTO: 7.3 % (ref 19.6–45.3)
LYMPHOCYTES NFR BLD AUTO: 9.2 % (ref 19.6–45.3)
LYMPHOCYTES NFR BLD AUTO: 9.8 % (ref 19.6–45.3)
LYMPHOCYTES NFR BLD MANUAL: 10 % (ref 19.6–45.3)
LYMPHOCYTES NFR BLD MANUAL: 10 % (ref 5–12)
LYMPHOCYTES NFR BLD MANUAL: 18 % (ref 19.6–45.3)
LYMPHOCYTES NFR BLD MANUAL: 9 % (ref 5–12)
LYMPHOCYTES NFR FLD MANUAL: 53 %
M PNEUMO IGG SER IA-ACNC: NOT DETECTED
MACROPHAGE FLUID: 13 %
MAGNESIUM SERPL-MCNC: 1.5 MG/DL (ref 1.6–2.6)
MAGNESIUM SERPL-MCNC: 1.6 MG/DL (ref 1.6–2.6)
MAGNESIUM SERPL-MCNC: 1.7 MG/DL (ref 1.6–2.6)
MAGNESIUM SERPL-MCNC: 1.8 MG/DL (ref 1.6–2.6)
MAGNESIUM SERPL-MCNC: 1.9 MG/DL (ref 1.6–2.6)
MAGNESIUM SERPL-MCNC: 1.9 MG/DL (ref 1.6–2.6)
MAGNESIUM SERPL-MCNC: 2 MG/DL (ref 1.6–2.6)
MAGNESIUM SERPL-MCNC: 2.2 MG/DL (ref 1.6–2.6)
MAGNESIUM SERPL-MCNC: 2.3 MG/DL (ref 1.6–2.6)
MAGNESIUM SERPL-MCNC: 2.6 MG/DL (ref 1.6–2.6)
MAGNESIUM SERPL-MCNC: 2.6 MG/DL (ref 1.6–2.6)
MCH RBC QN AUTO: 21.2 PG (ref 26.6–33)
MCH RBC QN AUTO: 21.4 PG (ref 26.6–33)
MCH RBC QN AUTO: 21.5 PG (ref 26.6–33)
MCH RBC QN AUTO: 21.7 PG (ref 26.6–33)
MCH RBC QN AUTO: 21.9 PG (ref 26.6–33)
MCH RBC QN AUTO: 23.7 PG (ref 26.6–33)
MCH RBC QN AUTO: 23.8 PG (ref 26.6–33)
MCH RBC QN AUTO: 24.3 PG (ref 26.6–33)
MCH RBC QN AUTO: 24.8 PG (ref 26.6–33)
MCH RBC QN AUTO: 24.8 PG (ref 26.6–33)
MCH RBC QN AUTO: 24.9 PG (ref 26.6–33)
MCH RBC QN AUTO: 24.9 PG (ref 26.6–33)
MCH RBC QN AUTO: 25 PG (ref 26.6–33)
MCH RBC QN AUTO: 25 PG (ref 26.6–33)
MCH RBC QN AUTO: 25.1 PG (ref 26.6–33)
MCH RBC QN AUTO: 25.2 PG (ref 26.6–33)
MCH RBC QN AUTO: 25.3 PG (ref 26.6–33)
MCH RBC QN AUTO: 25.4 PG (ref 26.6–33)
MCH RBC QN AUTO: 25.5 PG (ref 26.6–33)
MCH RBC QN AUTO: 25.6 PG (ref 26.6–33)
MCH RBC QN AUTO: 25.7 PG (ref 26.6–33)
MCH RBC QN AUTO: 25.8 PG (ref 26.6–33)
MCH RBC QN AUTO: 25.9 PG (ref 26.6–33)
MCH RBC QN AUTO: 26 PG (ref 26.6–33)
MCH RBC QN AUTO: 26.1 PG (ref 26.6–33)
MCH RBC QN AUTO: 26.2 PG (ref 26.6–33)
MCH RBC QN AUTO: 26.3 PG (ref 26.6–33)
MCH RBC QN AUTO: 26.4 PG (ref 26.6–33)
MCHC RBC AUTO-ENTMCNC: 28.6 G/DL (ref 31.5–35.7)
MCHC RBC AUTO-ENTMCNC: 28.8 G/DL (ref 31.5–35.7)
MCHC RBC AUTO-ENTMCNC: 28.8 G/DL (ref 31.5–35.7)
MCHC RBC AUTO-ENTMCNC: 28.9 G/DL (ref 31.5–35.7)
MCHC RBC AUTO-ENTMCNC: 29 G/DL (ref 31.5–35.7)
MCHC RBC AUTO-ENTMCNC: 29 G/DL (ref 31.5–35.7)
MCHC RBC AUTO-ENTMCNC: 29.1 G/DL (ref 31.5–35.7)
MCHC RBC AUTO-ENTMCNC: 29.2 G/DL (ref 31.5–35.7)
MCHC RBC AUTO-ENTMCNC: 29.4 G/DL (ref 31.5–35.7)
MCHC RBC AUTO-ENTMCNC: 29.5 G/DL (ref 31.5–35.7)
MCHC RBC AUTO-ENTMCNC: 29.6 G/DL (ref 31.5–35.7)
MCHC RBC AUTO-ENTMCNC: 29.7 G/DL (ref 31.5–35.7)
MCHC RBC AUTO-ENTMCNC: 29.7 G/DL (ref 31.5–35.7)
MCHC RBC AUTO-ENTMCNC: 29.8 G/DL (ref 31.5–35.7)
MCHC RBC AUTO-ENTMCNC: 30 G/DL (ref 31.5–35.7)
MCHC RBC AUTO-ENTMCNC: 30.1 G/DL (ref 31.5–35.7)
MCHC RBC AUTO-ENTMCNC: 30.1 G/DL (ref 31.5–35.7)
MCHC RBC AUTO-ENTMCNC: 30.2 G/DL (ref 31.5–35.7)
MCHC RBC AUTO-ENTMCNC: 30.3 G/DL (ref 31.5–35.7)
MCHC RBC AUTO-ENTMCNC: 30.4 G/DL (ref 31.5–35.7)
MCHC RBC AUTO-ENTMCNC: 30.5 G/DL (ref 31.5–35.7)
MCHC RBC AUTO-ENTMCNC: 30.5 G/DL (ref 31.5–35.7)
MCHC RBC AUTO-ENTMCNC: 30.6 G/DL (ref 31.5–35.7)
MCHC RBC AUTO-ENTMCNC: 30.8 G/DL (ref 31.5–35.7)
MCHC RBC AUTO-ENTMCNC: 30.8 G/DL (ref 31.5–35.7)
MCHC RBC AUTO-ENTMCNC: 30.9 G/DL (ref 31.5–35.7)
MCHC RBC AUTO-ENTMCNC: 31 G/DL (ref 31.5–35.7)
MCHC RBC AUTO-ENTMCNC: 31 G/DL (ref 31.5–35.7)
MCHC RBC AUTO-ENTMCNC: 31.1 G/DL (ref 31.5–35.7)
MCHC RBC AUTO-ENTMCNC: 31.3 G/DL (ref 31.5–35.7)
MCHC RBC AUTO-ENTMCNC: 31.4 G/DL (ref 31.5–35.7)
MCHC RBC AUTO-ENTMCNC: 31.5 G/DL (ref 31.5–35.7)
MCHC RBC AUTO-ENTMCNC: 31.8 G/DL (ref 31.5–35.7)
MCHC RBC AUTO-ENTMCNC: 32.6 G/DL (ref 31.5–35.7)
MCHC RBC AUTO-ENTMCNC: 32.6 G/DL (ref 31.5–35.7)
MCHC RBC AUTO-ENTMCNC: 32.9 G/DL (ref 31.5–35.7)
MCV RBC AUTO: 74.2 FL (ref 79–97)
MCV RBC AUTO: 74.5 FL (ref 79–97)
MCV RBC AUTO: 74.6 FL (ref 79–97)
MCV RBC AUTO: 74.8 FL (ref 79–97)
MCV RBC AUTO: 75.4 FL (ref 79–97)
MCV RBC AUTO: 79.2 FL (ref 79–97)
MCV RBC AUTO: 79.8 FL (ref 79–97)
MCV RBC AUTO: 80 FL (ref 79–97)
MCV RBC AUTO: 80.1 FL (ref 79–97)
MCV RBC AUTO: 80.4 FL (ref 79–97)
MCV RBC AUTO: 80.8 FL (ref 79–97)
MCV RBC AUTO: 81.4 FL (ref 79–97)
MCV RBC AUTO: 81.7 FL (ref 79–97)
MCV RBC AUTO: 82 FL (ref 79–97)
MCV RBC AUTO: 82.2 FL (ref 79–97)
MCV RBC AUTO: 82.2 FL (ref 79–97)
MCV RBC AUTO: 82.4 FL (ref 79–97)
MCV RBC AUTO: 82.4 FL (ref 79–97)
MCV RBC AUTO: 82.5 FL (ref 79–97)
MCV RBC AUTO: 82.6 FL (ref 79–97)
MCV RBC AUTO: 82.7 FL (ref 79–97)
MCV RBC AUTO: 82.8 FL (ref 79–97)
MCV RBC AUTO: 82.8 FL (ref 79–97)
MCV RBC AUTO: 82.9 FL (ref 79–97)
MCV RBC AUTO: 83 FL (ref 79–97)
MCV RBC AUTO: 83.5 FL (ref 79–97)
MCV RBC AUTO: 83.7 FL (ref 79–97)
MCV RBC AUTO: 83.8 FL (ref 79–97)
MCV RBC AUTO: 83.9 FL (ref 79–97)
MCV RBC AUTO: 83.9 FL (ref 79–97)
MCV RBC AUTO: 84.5 FL (ref 79–97)
MCV RBC AUTO: 84.7 FL (ref 79–97)
MCV RBC AUTO: 84.9 FL (ref 79–97)
MCV RBC AUTO: 84.9 FL (ref 79–97)
MCV RBC AUTO: 85.1 FL (ref 79–97)
MCV RBC AUTO: 85.3 FL (ref 79–97)
MCV RBC AUTO: 85.4 FL (ref 79–97)
MCV RBC AUTO: 85.4 FL (ref 79–97)
MCV RBC AUTO: 85.5 FL (ref 79–97)
MCV RBC AUTO: 85.6 FL (ref 79–97)
MCV RBC AUTO: 85.8 FL (ref 79–97)
MCV RBC AUTO: 85.8 FL (ref 79–97)
MCV RBC AUTO: 85.9 FL (ref 79–97)
MCV RBC AUTO: 86 FL (ref 79–97)
MCV RBC AUTO: 86 FL (ref 79–97)
MCV RBC AUTO: 86.3 FL (ref 79–97)
MCV RBC AUTO: 86.4 FL (ref 79–97)
MCV RBC AUTO: 86.4 FL (ref 79–97)
MCV RBC AUTO: 86.5 FL (ref 79–97)
MCV RBC AUTO: 86.9 FL (ref 79–97)
MCV RBC AUTO: 87.3 FL (ref 79–97)
MCV RBC AUTO: 88.5 FL (ref 79–97)
MESOTHL CELL NFR FLD MANUAL: 14 %
METHGB BLD QL: 0.2 % (ref 0–1.5)
METHGB BLD QL: 0.3 % (ref 0–1.5)
METHGB BLD QL: 0.4 %
METHGB BLD QL: 0.4 % (ref 0–1.5)
METHGB BLD QL: 0.9 % (ref 0–1.5)
MICROCYTES BLD QL: ABNORMAL
MICROCYTES BLD QL: ABNORMAL
MID SFA PSV LEFT: -41.3 CM/SEC
MODALITY: ABNORMAL
MONOCYTES # BLD AUTO: 0.62 10*3/MM3 (ref 0.1–0.9)
MONOCYTES # BLD AUTO: 0.67 10*3/MM3 (ref 0.1–0.9)
MONOCYTES # BLD AUTO: 0.72 10*3/MM3 (ref 0.1–0.9)
MONOCYTES # BLD AUTO: 0.72 10*3/MM3 (ref 0.1–0.9)
MONOCYTES # BLD AUTO: 0.73 10*3/MM3 (ref 0.1–0.9)
MONOCYTES # BLD AUTO: 0.74 10*3/MM3 (ref 0.1–0.9)
MONOCYTES # BLD AUTO: 0.76 10*3/MM3 (ref 0.1–0.9)
MONOCYTES # BLD AUTO: 0.88 10*3/MM3 (ref 0.1–0.9)
MONOCYTES # BLD AUTO: 0.89 10*3/MM3 (ref 0.1–0.9)
MONOCYTES # BLD AUTO: 0.9 10*3/MM3 (ref 0.1–0.9)
MONOCYTES # BLD AUTO: 0.9 10*3/MM3 (ref 0.1–0.9)
MONOCYTES # BLD AUTO: 0.93 10*3/MM3 (ref 0.1–0.9)
MONOCYTES # BLD AUTO: 0.93 10*3/MM3 (ref 0.1–0.9)
MONOCYTES # BLD AUTO: 0.94 10*3/MM3 (ref 0.1–0.9)
MONOCYTES # BLD AUTO: 0.96 10*3/MM3 (ref 0.1–0.9)
MONOCYTES # BLD AUTO: 0.97 10*3/MM3 (ref 0.1–0.9)
MONOCYTES # BLD AUTO: 0.98 10*3/MM3 (ref 0.1–0.9)
MONOCYTES # BLD AUTO: 1.04 10*3/MM3 (ref 0.1–0.9)
MONOCYTES # BLD AUTO: 1.08 10*3/MM3 (ref 0.1–0.9)
MONOCYTES # BLD AUTO: 1.1 10*3/MM3 (ref 0.1–0.9)
MONOCYTES # BLD AUTO: 1.16 10*3/MM3 (ref 0.1–0.9)
MONOCYTES # BLD AUTO: 1.16 10*3/MM3 (ref 0.1–0.9)
MONOCYTES # BLD AUTO: 1.17 10*3/MM3 (ref 0.1–0.9)
MONOCYTES # BLD AUTO: 1.17 10*3/MM3 (ref 0.1–0.9)
MONOCYTES # BLD AUTO: 1.18 10*3/MM3 (ref 0.1–0.9)
MONOCYTES # BLD AUTO: 1.31 10*3/MM3 (ref 0.1–0.9)
MONOCYTES # BLD AUTO: 1.43 10*3/MM3 (ref 0.1–0.9)
MONOCYTES # BLD AUTO: 1.55 10*3/MM3 (ref 0.1–0.9)
MONOCYTES NFR BLD AUTO: 10.3 % (ref 5–12)
MONOCYTES NFR BLD AUTO: 11.2 % (ref 5–12)
MONOCYTES NFR BLD AUTO: 3.6 % (ref 5–12)
MONOCYTES NFR BLD AUTO: 3.8 % (ref 5–12)
MONOCYTES NFR BLD AUTO: 5 % (ref 5–12)
MONOCYTES NFR BLD AUTO: 5.7 % (ref 5–12)
MONOCYTES NFR BLD AUTO: 5.8 % (ref 5–12)
MONOCYTES NFR BLD AUTO: 7 % (ref 5–12)
MONOCYTES NFR BLD AUTO: 7.1 % (ref 5–12)
MONOCYTES NFR BLD AUTO: 7.2 % (ref 5–12)
MONOCYTES NFR BLD AUTO: 7.2 % (ref 5–12)
MONOCYTES NFR BLD AUTO: 7.4 % (ref 5–12)
MONOCYTES NFR BLD AUTO: 7.5 % (ref 5–12)
MONOCYTES NFR BLD AUTO: 7.6 % (ref 5–12)
MONOCYTES NFR BLD AUTO: 7.8 % (ref 5–12)
MONOCYTES NFR BLD AUTO: 7.8 % (ref 5–12)
MONOCYTES NFR BLD AUTO: 7.9 % (ref 5–12)
MONOCYTES NFR BLD AUTO: 8.2 % (ref 5–12)
MONOCYTES NFR BLD AUTO: 8.3 % (ref 5–12)
MONOCYTES NFR BLD AUTO: 8.6 % (ref 5–12)
MONOCYTES NFR BLD AUTO: 8.6 % (ref 5–12)
MONOCYTES NFR BLD AUTO: 8.9 % (ref 5–12)
MONOCYTES NFR BLD AUTO: 9 % (ref 5–12)
MONOCYTES NFR BLD AUTO: 9.5 % (ref 5–12)
MONOCYTES NFR BLD AUTO: 9.5 % (ref 5–12)
MONOCYTES NFR BLD AUTO: 9.6 % (ref 5–12)
MONOCYTES NFR FLD: 6 %
MYCOBACTERIUM SPEC CULT: NORMAL
MYCOBACTERIUM SPEC CULT: NORMAL
NEUTROPHILS # BLD AUTO: 10.19 10*3/MM3 (ref 1.7–7)
NEUTROPHILS # BLD AUTO: 11.16 10*3/MM3 (ref 1.7–7)
NEUTROPHILS # BLD AUTO: 15.65 10*3/MM3 (ref 1.7–7)
NEUTROPHILS # BLD AUTO: 18.56 10*3/MM3 (ref 1.7–7)
NEUTROPHILS # BLD AUTO: 23.03 10*3/MM3 (ref 1.7–7)
NEUTROPHILS # BLD AUTO: 24.67 10*3/MM3 (ref 1.7–7)
NEUTROPHILS # BLD AUTO: 36.12 10*3/MM3 (ref 1.7–7)
NEUTROPHILS # BLD AUTO: 8.08 10*3/MM3 (ref 1.7–7)
NEUTROPHILS NFR BLD AUTO: 10.42 10*3/MM3 (ref 1.7–7)
NEUTROPHILS NFR BLD AUTO: 4.22 10*3/MM3 (ref 1.7–7)
NEUTROPHILS NFR BLD AUTO: 4.54 10*3/MM3 (ref 1.7–7)
NEUTROPHILS NFR BLD AUTO: 4.85 10*3/MM3 (ref 1.7–7)
NEUTROPHILS NFR BLD AUTO: 5.61 10*3/MM3 (ref 1.7–7)
NEUTROPHILS NFR BLD AUTO: 53.7 % (ref 42.7–76)
NEUTROPHILS NFR BLD AUTO: 55.1 % (ref 42.7–76)
NEUTROPHILS NFR BLD AUTO: 57.5 % (ref 42.7–76)
NEUTROPHILS NFR BLD AUTO: 58.6 % (ref 42.7–76)
NEUTROPHILS NFR BLD AUTO: 59.7 % (ref 42.7–76)
NEUTROPHILS NFR BLD AUTO: 6.17 10*3/MM3 (ref 1.7–7)
NEUTROPHILS NFR BLD AUTO: 6.47 10*3/MM3 (ref 1.7–7)
NEUTROPHILS NFR BLD AUTO: 6.73 10*3/MM3 (ref 1.7–7)
NEUTROPHILS NFR BLD AUTO: 6.86 10*3/MM3 (ref 1.7–7)
NEUTROPHILS NFR BLD AUTO: 60.1 % (ref 42.7–76)
NEUTROPHILS NFR BLD AUTO: 64.3 % (ref 42.7–76)
NEUTROPHILS NFR BLD AUTO: 65.5 % (ref 42.7–76)
NEUTROPHILS NFR BLD AUTO: 67.7 % (ref 42.7–76)
NEUTROPHILS NFR BLD AUTO: 68.2 % (ref 42.7–76)
NEUTROPHILS NFR BLD AUTO: 68.3 % (ref 42.7–76)
NEUTROPHILS NFR BLD AUTO: 68.7 % (ref 42.7–76)
NEUTROPHILS NFR BLD AUTO: 69.6 % (ref 42.7–76)
NEUTROPHILS NFR BLD AUTO: 7.03 10*3/MM3 (ref 1.7–7)
NEUTROPHILS NFR BLD AUTO: 7.08 10*3/MM3 (ref 1.7–7)
NEUTROPHILS NFR BLD AUTO: 7.35 10*3/MM3 (ref 1.7–7)
NEUTROPHILS NFR BLD AUTO: 7.65 10*3/MM3 (ref 1.7–7)
NEUTROPHILS NFR BLD AUTO: 7.7 10*3/MM3 (ref 1.7–7)
NEUTROPHILS NFR BLD AUTO: 7.87 10*3/MM3 (ref 1.7–7)
NEUTROPHILS NFR BLD AUTO: 7.9 10*3/MM3 (ref 1.7–7)
NEUTROPHILS NFR BLD AUTO: 70 % (ref 42.7–76)
NEUTROPHILS NFR BLD AUTO: 70.4 % (ref 42.7–76)
NEUTROPHILS NFR BLD AUTO: 70.6 % (ref 42.7–76)
NEUTROPHILS NFR BLD AUTO: 71 % (ref 42.7–76)
NEUTROPHILS NFR BLD AUTO: 71.4 % (ref 42.7–76)
NEUTROPHILS NFR BLD AUTO: 72.1 % (ref 42.7–76)
NEUTROPHILS NFR BLD AUTO: 73.7 % (ref 42.7–76)
NEUTROPHILS NFR BLD AUTO: 75.1 % (ref 42.7–76)
NEUTROPHILS NFR BLD AUTO: 76.1 % (ref 42.7–76)
NEUTROPHILS NFR BLD AUTO: 78 % (ref 42.7–76)
NEUTROPHILS NFR BLD AUTO: 8.1 10*3/MM3 (ref 1.7–7)
NEUTROPHILS NFR BLD AUTO: 8.18 10*3/MM3 (ref 1.7–7)
NEUTROPHILS NFR BLD AUTO: 8.37 10*3/MM3 (ref 1.7–7)
NEUTROPHILS NFR BLD AUTO: 8.87 10*3/MM3 (ref 1.7–7)
NEUTROPHILS NFR BLD AUTO: 82.1 % (ref 42.7–76)
NEUTROPHILS NFR BLD AUTO: 82.2 % (ref 42.7–76)
NEUTROPHILS NFR BLD AUTO: 87.2 % (ref 42.7–76)
NEUTROPHILS NFR BLD AUTO: 88 % (ref 42.7–76)
NEUTROPHILS NFR BLD AUTO: 89.3 % (ref 42.7–76)
NEUTROPHILS NFR BLD AUTO: 9.3 10*3/MM3 (ref 1.7–7)
NEUTROPHILS NFR BLD AUTO: 9.5 10*3/MM3 (ref 1.7–7)
NEUTROPHILS NFR BLD MANUAL: 69 % (ref 42.7–76)
NEUTROPHILS NFR BLD MANUAL: 78 % (ref 42.7–76)
NEUTROPHILS NFR FLD MANUAL: 14 %
NEUTS BAND NFR BLD MANUAL: 1 % (ref 0–5)
NIGHT BLUE STAIN TISS: NORMAL
NIGHT BLUE STAIN TISS: NORMAL
NITRITE UR QL STRIP: NEGATIVE
NOTE: ABNORMAL
NRBC BLD AUTO-RTO: 0 /100 WBC (ref 0–0.2)
NRBC BLD AUTO-RTO: 0.1 /100 WBC (ref 0–0.2)
NRBC BLD AUTO-RTO: 0.2 /100 WBC (ref 0–0.2)
NT-PROBNP SERPL-MCNC: 4035 PG/ML (ref 5–900)
NT-PROBNP SERPL-MCNC: 7498 PG/ML (ref 0–900)
NT-PROBNP SERPL-MCNC: 8296 PG/ML (ref 0–900)
NT-PROBNP SERPL-MCNC: 8468 PG/ML (ref 0–900)
NT-PROBNP SERPL-MCNC: ABNORMAL PG/ML (ref 0–900)
OSMOLALITY SERPL: 293 MOSM/KG (ref 275–295)
OXYHGB MFR BLDV: 43.2 %
OXYHGB MFR BLDV: 88.1 % (ref 94–99)
OXYHGB MFR BLDV: 95.2 % (ref 94–99)
OXYHGB MFR BLDV: 96.4 % (ref 94–99)
OXYHGB MFR BLDV: 97 % (ref 94–99)
PATH REPORT.FINAL DX SPEC: NORMAL
PATH REPORT.FINAL DX SPEC: NORMAL
PATH REPORT.GROSS SPEC: NORMAL
PAW @ PEAK INSP FLOW SETTING VENT: 0 CMH2O
PCO2 BLDA: 37.3 MM HG (ref 35–45)
PCO2 BLDA: 37.8 MM HG (ref 35–45)
PCO2 BLDA: 45 MM HG (ref 35–45)
PCO2 BLDA: 51.5 MM HG (ref 35–45)
PCO2 BLDV: 45.4 MM HG (ref 41–51)
PCO2 TEMP ADJ BLD: 37.3 MM HG (ref 35–48)
PCO2 TEMP ADJ BLD: 37.8 MM HG (ref 35–48)
PCO2 TEMP ADJ BLD: 45 MM HG (ref 35–48)
PCO2 TEMP ADJ BLD: 51.5 MM HG (ref 35–48)
PH BLDA: 7.32 PH UNITS (ref 7.35–7.45)
PH BLDA: 7.45 PH UNITS (ref 7.35–7.45)
PH BLDA: 7.5 PH UNITS (ref 7.35–7.45)
PH BLDA: 7.5 PH UNITS (ref 7.35–7.45)
PH BLDV: 7.3 PH UNITS
PH FLD: 8.31 [PH]
PH UR STRIP.AUTO: 5.5 [PH] (ref 5–8)
PH UR STRIP.AUTO: 6 [PH] (ref 5–8)
PH UR STRIP.AUTO: <=5 [PH] (ref 5–8)
PH, TEMP CORRECTED: 7.32 PH UNITS
PH, TEMP CORRECTED: 7.45 PH UNITS
PH, TEMP CORRECTED: 7.5 PH UNITS
PH, TEMP CORRECTED: 7.5 PH UNITS
PHOSPHATE SERPL-MCNC: 3 MG/DL (ref 2.5–4.5)
PHOSPHATE SERPL-MCNC: 3.7 MG/DL (ref 2.5–4.5)
PHOSPHATE SERPL-MCNC: 3.8 MG/DL (ref 2.5–4.5)
PHOSPHATE SERPL-MCNC: 4.2 MG/DL (ref 2.5–4.5)
PLAT MORPH BLD: NORMAL
PLATELET # BLD AUTO: 236 10*3/MM3 (ref 140–450)
PLATELET # BLD AUTO: 259 10*3/MM3 (ref 140–450)
PLATELET # BLD AUTO: 263 10*3/MM3 (ref 140–450)
PLATELET # BLD AUTO: 276 10*3/MM3 (ref 140–450)
PLATELET # BLD AUTO: 280 10*3/MM3 (ref 140–450)
PLATELET # BLD AUTO: 284 10*3/MM3 (ref 140–450)
PLATELET # BLD AUTO: 287 10*3/MM3 (ref 140–450)
PLATELET # BLD AUTO: 299 10*3/MM3 (ref 140–450)
PLATELET # BLD AUTO: 311 10*3/MM3 (ref 140–450)
PLATELET # BLD AUTO: 312 10*3/MM3 (ref 140–450)
PLATELET # BLD AUTO: 328 10*3/MM3 (ref 140–450)
PLATELET # BLD AUTO: 331 10*3/MM3 (ref 140–450)
PLATELET # BLD AUTO: 332 10*3/MM3 (ref 140–450)
PLATELET # BLD AUTO: 336 10*3/MM3 (ref 140–450)
PLATELET # BLD AUTO: 337 10*3/MM3 (ref 140–450)
PLATELET # BLD AUTO: 337 10*3/MM3 (ref 140–450)
PLATELET # BLD AUTO: 338 10*3/MM3 (ref 140–450)
PLATELET # BLD AUTO: 339 10*3/MM3 (ref 140–450)
PLATELET # BLD AUTO: 342 10*3/MM3 (ref 140–450)
PLATELET # BLD AUTO: 343 10*3/MM3 (ref 140–450)
PLATELET # BLD AUTO: 343 10*3/MM3 (ref 140–450)
PLATELET # BLD AUTO: 344 10*3/MM3 (ref 140–450)
PLATELET # BLD AUTO: 347 10*3/MM3 (ref 140–450)
PLATELET # BLD AUTO: 348 10*3/MM3 (ref 140–450)
PLATELET # BLD AUTO: 351 10*3/MM3 (ref 140–450)
PLATELET # BLD AUTO: 352 10*3/MM3 (ref 140–450)
PLATELET # BLD AUTO: 353 10*3/MM3 (ref 140–450)
PLATELET # BLD AUTO: 354 10*3/MM3 (ref 140–450)
PLATELET # BLD AUTO: 357 10*3/MM3 (ref 140–450)
PLATELET # BLD AUTO: 357 10*3/MM3 (ref 140–450)
PLATELET # BLD AUTO: 359 10*3/MM3 (ref 140–450)
PLATELET # BLD AUTO: 360 10*3/MM3 (ref 140–450)
PLATELET # BLD AUTO: 365 10*3/MM3 (ref 140–450)
PLATELET # BLD AUTO: 369 10*3/MM3 (ref 140–450)
PLATELET # BLD AUTO: 372 10*3/MM3 (ref 140–450)
PLATELET # BLD AUTO: 372 10*3/MM3 (ref 140–450)
PLATELET # BLD AUTO: 373 10*3/MM3 (ref 140–450)
PLATELET # BLD AUTO: 378 10*3/MM3 (ref 140–450)
PLATELET # BLD AUTO: 381 10*3/MM3 (ref 140–450)
PLATELET # BLD AUTO: 392 10*3/MM3 (ref 140–450)
PLATELET # BLD AUTO: 398 10*3/MM3 (ref 140–450)
PLATELET # BLD AUTO: 436 10*3/MM3 (ref 140–450)
PLATELET # BLD AUTO: 437 10*3/MM3 (ref 140–450)
PLATELET # BLD AUTO: 450 10*3/MM3 (ref 140–450)
PLATELET # BLD AUTO: 471 10*3/MM3 (ref 140–450)
PLATELET # BLD AUTO: 478 10*3/MM3 (ref 140–450)
PLATELET # BLD AUTO: 497 10*3/MM3 (ref 140–450)
PLATELET # BLD AUTO: 498 10*3/MM3 (ref 140–450)
PLATELET # BLD AUTO: 503 10*3/MM3 (ref 140–450)
PLATELET # BLD AUTO: 504 10*3/MM3 (ref 140–450)
PLATELET # BLD AUTO: 505 10*3/MM3 (ref 140–450)
PLATELET # BLD AUTO: 508 10*3/MM3 (ref 140–450)
PLATELET # BLD AUTO: 515 10*3/MM3 (ref 140–450)
PLATELET # BLD AUTO: 588 10*3/MM3 (ref 140–450)
PMV BLD AUTO: 10.1 FL (ref 6–12)
PMV BLD AUTO: 10.3 FL (ref 6–12)
PMV BLD AUTO: 10.3 FL (ref 6–12)
PMV BLD AUTO: 10.4 FL (ref 6–12)
PMV BLD AUTO: 10.5 FL (ref 6–12)
PMV BLD AUTO: 10.6 FL (ref 6–12)
PMV BLD AUTO: 10.7 FL (ref 6–12)
PMV BLD AUTO: 10.8 FL (ref 6–12)
PMV BLD AUTO: 10.9 FL (ref 6–12)
PMV BLD AUTO: 11 FL (ref 6–12)
PMV BLD AUTO: 11 FL (ref 6–12)
PMV BLD AUTO: 11.1 FL (ref 6–12)
PMV BLD AUTO: 11.1 FL (ref 6–12)
PMV BLD AUTO: 11.2 FL (ref 6–12)
PMV BLD AUTO: 11.2 FL (ref 6–12)
PMV BLD AUTO: 11.3 FL (ref 6–12)
PMV BLD AUTO: 11.4 FL (ref 6–12)
PMV BLD AUTO: 11.5 FL (ref 6–12)
PMV BLD AUTO: 11.5 FL (ref 6–12)
PMV BLD AUTO: 11.6 FL (ref 6–12)
PMV BLD AUTO: 11.7 FL (ref 6–12)
PMV BLD AUTO: 11.8 FL (ref 6–12)
PMV BLD AUTO: 11.8 FL (ref 6–12)
PMV BLD AUTO: 11.9 FL (ref 6–12)
PMV BLD AUTO: 11.9 FL (ref 6–12)
PMV BLD AUTO: 12.2 FL (ref 6–12)
PMV BLD AUTO: 12.5 FL (ref 6–12)
PMV BLD AUTO: 9.8 FL (ref 6–12)
PO2 BLDA: 104 MM HG (ref 83–108)
PO2 BLDA: 56.1 MM HG (ref 83–108)
PO2 BLDA: 81.8 MM HG (ref 83–108)
PO2 BLDA: 91.3 MM HG (ref 83–108)
PO2 BLDV: 28.9 MM HG (ref 27–53)
PO2 TEMP ADJ BLD: 104 MM HG (ref 83–108)
PO2 TEMP ADJ BLD: 56.1 MM HG (ref 83–108)
PO2 TEMP ADJ BLD: 81.8 MM HG (ref 83–108)
PO2 TEMP ADJ BLD: 91.3 MM HG (ref 83–108)
POLYCHROMASIA BLD QL SMEAR: NORMAL
POTASSIUM BLD-SCNC: 3.4 MMOL/L (ref 3.5–5.2)
POTASSIUM BLD-SCNC: 3.4 MMOL/L (ref 3.5–5.2)
POTASSIUM BLD-SCNC: 3.6 MMOL/L (ref 3.5–5.2)
POTASSIUM BLD-SCNC: 3.7 MMOL/L (ref 3.5–5.2)
POTASSIUM BLD-SCNC: 3.8 MMOL/L (ref 3.5–5.2)
POTASSIUM BLD-SCNC: 3.9 MMOL/L (ref 3.5–5.2)
POTASSIUM BLD-SCNC: 3.9 MMOL/L (ref 3.5–5.2)
POTASSIUM BLD-SCNC: 4 MMOL/L (ref 3.5–5.2)
POTASSIUM BLD-SCNC: 4.1 MMOL/L (ref 3.5–5.2)
POTASSIUM BLD-SCNC: 4.1 MMOL/L (ref 3.5–5.2)
POTASSIUM BLD-SCNC: 4.2 MMOL/L (ref 3.5–5.2)
POTASSIUM BLD-SCNC: 4.4 MMOL/L (ref 3.5–5.2)
POTASSIUM BLD-SCNC: 4.7 MMOL/L (ref 3.5–5.2)
POTASSIUM BLD-SCNC: 4.9 MMOL/L (ref 3.5–5.2)
POTASSIUM BLD-SCNC: 5.5 MMOL/L (ref 3.5–5.2)
POTASSIUM SERPL-SCNC: 3.2 MMOL/L (ref 3.5–5.2)
POTASSIUM SERPL-SCNC: 3.5 MMOL/L (ref 3.5–5.2)
POTASSIUM SERPL-SCNC: 3.5 MMOL/L (ref 3.5–5.2)
POTASSIUM SERPL-SCNC: 3.6 MMOL/L (ref 3.5–5.2)
POTASSIUM SERPL-SCNC: 3.6 MMOL/L (ref 3.5–5.2)
POTASSIUM SERPL-SCNC: 3.8 MMOL/L (ref 3.5–5.2)
POTASSIUM SERPL-SCNC: 3.8 MMOL/L (ref 3.5–5.2)
POTASSIUM SERPL-SCNC: 3.9 MMOL/L (ref 3.5–5.2)
POTASSIUM SERPL-SCNC: 3.9 MMOL/L (ref 3.5–5.2)
POTASSIUM SERPL-SCNC: 4 MMOL/L (ref 3.5–5.2)
POTASSIUM SERPL-SCNC: 4 MMOL/L (ref 3.5–5.2)
POTASSIUM SERPL-SCNC: 4.2 MMOL/L (ref 3.5–5.2)
POTASSIUM SERPL-SCNC: 4.3 MMOL/L (ref 3.5–5.2)
POTASSIUM SERPL-SCNC: 4.4 MMOL/L (ref 3.5–5.2)
POTASSIUM SERPL-SCNC: 4.5 MMOL/L (ref 3.5–5.2)
POTASSIUM SERPL-SCNC: 4.6 MMOL/L (ref 3.5–5.2)
POTASSIUM SERPL-SCNC: 4.7 MMOL/L (ref 3.5–5.2)
POTASSIUM SERPL-SCNC: 4.8 MMOL/L (ref 3.5–5.2)
POTASSIUM SERPL-SCNC: 4.9 MMOL/L (ref 3.5–5.2)
POTASSIUM SERPL-SCNC: 4.9 MMOL/L (ref 3.5–5.2)
POTASSIUM SERPL-SCNC: 5 MMOL/L (ref 3.5–5.2)
POTASSIUM SERPL-SCNC: 5.1 MMOL/L (ref 3.5–5.2)
POTASSIUM SERPL-SCNC: 5.2 MMOL/L (ref 3.5–5.2)
POTASSIUM SERPL-SCNC: 5.3 MMOL/L (ref 3.5–5.2)
POTASSIUM SERPL-SCNC: 5.4 MMOL/L (ref 3.5–5.2)
POTASSIUM SERPL-SCNC: 5.5 MMOL/L (ref 3.5–5.2)
PROCALCITONIN SERPL-MCNC: 0.09 NG/ML (ref 0–0.25)
PROCALCITONIN SERPL-MCNC: 0.1 NG/ML (ref 0–0.25)
PROCALCITONIN SERPL-MCNC: 0.12 NG/ML (ref 0–0.25)
PROCALCITONIN SERPL-MCNC: 0.12 NG/ML (ref 0–0.25)
PROCALCITONIN SERPL-MCNC: 0.13 NG/ML (ref 0–0.25)
PROCALCITONIN SERPL-MCNC: 0.2 NG/ML (ref 0.1–0.25)
PROCALCITONIN SERPL-MCNC: 0.2 NG/ML (ref 0.1–0.25)
PROT FLD-MCNC: <1 G/DL
PROT SERPL-MCNC: 5.8 G/DL (ref 6–8.5)
PROT SERPL-MCNC: 5.9 G/DL (ref 6–8.5)
PROT SERPL-MCNC: 6 G/DL (ref 6–8.5)
PROT SERPL-MCNC: 6.1 G/DL (ref 6–8.5)
PROT SERPL-MCNC: 6.2 G/DL (ref 6–8.5)
PROT SERPL-MCNC: 6.2 G/DL (ref 6–8.5)
PROT SERPL-MCNC: 6.3 G/DL (ref 6–8.5)
PROT SERPL-MCNC: 6.4 G/DL (ref 6–8.5)
PROT SERPL-MCNC: 6.5 G/DL (ref 6–8.5)
PROT SERPL-MCNC: 6.6 G/DL (ref 6–8.5)
PROT SERPL-MCNC: 6.7 G/DL (ref 6–8.5)
PROT SERPL-MCNC: 6.7 G/DL (ref 6–8.5)
PROT SERPL-MCNC: 6.8 G/DL (ref 6–8.5)
PROT SERPL-MCNC: 6.8 G/DL (ref 6–8.5)
PROT SERPL-MCNC: 6.9 G/DL (ref 6–8.5)
PROT SERPL-MCNC: 6.9 G/DL (ref 6–8.5)
PROT SERPL-MCNC: 7 G/DL (ref 6–8.5)
PROT SERPL-MCNC: 7 G/DL (ref 6–8.5)
PROT SERPL-MCNC: 7.1 G/DL (ref 6–8.5)
PROT SERPL-MCNC: 7.4 G/DL (ref 6–8.5)
PROT SERPL-MCNC: 7.6 G/DL (ref 6–8.5)
PROT UR QL STRIP: ABNORMAL
PROT UR QL STRIP: NEGATIVE
PROTHROMBIN TIME: 13.6 SECONDS (ref 11.5–14)
PROTHROMBIN TIME: 13.9 SECONDS (ref 11.5–14)
PROTHROMBIN TIME: 14.5 SECONDS (ref 11.5–14)
PROTHROMBIN TIME: 16.3 SECONDS (ref 11.5–14)
PROTHROMBIN TIME: 16.7 SECONDS (ref 11.5–14)
PROX SFA PSV LEFT: 88 CM/SEC
QT INTERVAL: 356 MS
QT INTERVAL: 408 MS
QT INTERVAL: 432 MS
QTC INTERVAL: 430 MS
QTC INTERVAL: 449 MS
QTC INTERVAL: 455 MS
RBC # BLD AUTO: 3.03 10*6/MM3 (ref 4.14–5.8)
RBC # BLD AUTO: 3.07 10*6/MM3 (ref 4.14–5.8)
RBC # BLD AUTO: 3.08 10*6/MM3 (ref 4.14–5.8)
RBC # BLD AUTO: 3.08 10*6/MM3 (ref 4.14–5.8)
RBC # BLD AUTO: 3.11 10*6/MM3 (ref 4.14–5.8)
RBC # BLD AUTO: 3.12 10*6/MM3 (ref 4.14–5.8)
RBC # BLD AUTO: 3.13 10*6/MM3 (ref 4.14–5.8)
RBC # BLD AUTO: 3.15 10*6/MM3 (ref 4.14–5.8)
RBC # BLD AUTO: 3.16 10*6/MM3 (ref 4.14–5.8)
RBC # BLD AUTO: 3.17 10*6/MM3 (ref 4.14–5.8)
RBC # BLD AUTO: 3.19 10*6/MM3 (ref 4.14–5.8)
RBC # BLD AUTO: 3.2 10*6/MM3 (ref 4.14–5.8)
RBC # BLD AUTO: 3.21 10*6/MM3 (ref 4.14–5.8)
RBC # BLD AUTO: 3.23 10*6/MM3 (ref 4.14–5.8)
RBC # BLD AUTO: 3.23 10*6/MM3 (ref 4.14–5.8)
RBC # BLD AUTO: 3.24 10*6/MM3 (ref 4.14–5.8)
RBC # BLD AUTO: 3.28 10*6/MM3 (ref 4.14–5.8)
RBC # BLD AUTO: 3.28 10*6/MM3 (ref 4.14–5.8)
RBC # BLD AUTO: 3.3 10*6/MM3 (ref 4.14–5.8)
RBC # BLD AUTO: 3.3 10*6/MM3 (ref 4.14–5.8)
RBC # BLD AUTO: 3.32 10*6/MM3 (ref 4.14–5.8)
RBC # BLD AUTO: 3.37 10*6/MM3 (ref 4.14–5.8)
RBC # BLD AUTO: 3.41 10*6/MM3 (ref 4.14–5.8)
RBC # BLD AUTO: 3.48 10*6/MM3 (ref 4.14–5.8)
RBC # BLD AUTO: 3.55 10*6/MM3 (ref 4.14–5.8)
RBC # BLD AUTO: 3.61 10*6/MM3 (ref 4.14–5.8)
RBC # BLD AUTO: 3.62 10*6/MM3 (ref 4.14–5.8)
RBC # BLD AUTO: 3.65 10*6/MM3 (ref 4.14–5.8)
RBC # BLD AUTO: 3.67 10*6/MM3 (ref 4.14–5.8)
RBC # BLD AUTO: 3.68 10*6/MM3 (ref 4.14–5.8)
RBC # BLD AUTO: 3.69 10*6/MM3 (ref 4.14–5.8)
RBC # BLD AUTO: 3.71 10*6/MM3 (ref 4.14–5.8)
RBC # BLD AUTO: 3.71 10*6/MM3 (ref 4.14–5.8)
RBC # BLD AUTO: 3.74 10*6/MM3 (ref 4.14–5.8)
RBC # BLD AUTO: 3.74 10*6/MM3 (ref 4.14–5.8)
RBC # BLD AUTO: 3.75 10*6/MM3 (ref 4.14–5.8)
RBC # BLD AUTO: 3.81 10*6/MM3 (ref 4.14–5.8)
RBC # BLD AUTO: 3.81 10*6/MM3 (ref 4.14–5.8)
RBC # BLD AUTO: 3.83 10*6/MM3 (ref 4.14–5.8)
RBC # BLD AUTO: 3.87 10*6/MM3 (ref 4.14–5.8)
RBC # BLD AUTO: 3.93 10*6/MM3 (ref 4.14–5.8)
RBC # BLD AUTO: 3.95 10*6/MM3 (ref 4.14–5.8)
RBC # BLD AUTO: 3.98 10*6/MM3 (ref 4.14–5.8)
RBC # BLD AUTO: 3.98 10*6/MM3 (ref 4.14–5.8)
RBC # BLD AUTO: 4.14 10*6/MM3 (ref 4.14–5.8)
RBC # BLD AUTO: 4.17 10*6/MM3 (ref 4.14–5.8)
RBC # BLD AUTO: 4.19 10*6/MM3 (ref 4.14–5.8)
RBC # BLD AUTO: 4.2 10*6/MM3 (ref 4.14–5.8)
RBC # BLD AUTO: 4.29 10*6/MM3 (ref 4.14–5.8)
RBC # BLD AUTO: 4.32 10*6/MM3 (ref 4.14–5.8)
RBC # BLD AUTO: 4.48 10*6/MM3 (ref 4.14–5.8)
RBC # BLD AUTO: 4.6 10*6/MM3 (ref 4.14–5.8)
RBC # BLD AUTO: 4.65 10*6/MM3 (ref 4.14–5.8)
RBC # BLD AUTO: 4.67 10*6/MM3 (ref 4.14–5.8)
RBC # FLD AUTO: <2000 /MM3
RBC # UR: ABNORMAL /HPF
REF LAB TEST METHOD: ABNORMAL
REF LAB TEST METHOD: NORMAL
REF LAB TEST METHOD: NORMAL
RETICS # AUTO: 0.1 10*6/MM3 (ref 0.02–0.13)
RETICS/RBC NFR AUTO: 2.64 % (ref 0.7–1.9)
RH BLD: POSITIVE
RHINOVIRUS RNA SPEC NAA+PROBE: NOT DETECTED
RIGHT CFA PROX SYS PSV: 117.1 CM/SEC
RSV RNA NPH QL NAA+NON-PROBE: NOT DETECTED
SARS-COV-2 N GENE NPH QL NAA+PROBE: NOT DETECTED
SARS-COV-2 RDRP RESP QL NAA+PROBE: NORMAL
SARS-COV-2 RNA NOSE QL NAA+PROBE: NOT DETECTED
SARS-COV-2 RNA PNL SPEC NAA+PROBE: NOT DETECTED
SARS-COV-2 RNA RESP QL NAA+PROBE: NOT DETECTED
SODIUM BLD-SCNC: 127 MMOL/L (ref 136–145)
SODIUM BLD-SCNC: 130 MMOL/L (ref 136–145)
SODIUM BLD-SCNC: 131 MMOL/L (ref 136–145)
SODIUM BLD-SCNC: 131 MMOL/L (ref 136–145)
SODIUM BLD-SCNC: 132 MMOL/L (ref 136–145)
SODIUM BLD-SCNC: 132 MMOL/L (ref 136–145)
SODIUM BLD-SCNC: 133 MMOL/L (ref 136–145)
SODIUM BLD-SCNC: 134 MMOL/L (ref 136–145)
SODIUM BLD-SCNC: 135 MMOL/L (ref 136–145)
SODIUM BLD-SCNC: 136 MMOL/L (ref 136–145)
SODIUM BLD-SCNC: 136 MMOL/L (ref 136–145)
SODIUM BLD-SCNC: 137 MMOL/L (ref 136–145)
SODIUM BLD-SCNC: 138 MMOL/L (ref 136–145)
SODIUM BLD-SCNC: 138 MMOL/L (ref 136–145)
SODIUM BLD-SCNC: 139 MMOL/L (ref 136–145)
SODIUM SERPL-SCNC: 133 MMOL/L (ref 136–145)
SODIUM SERPL-SCNC: 133 MMOL/L (ref 136–145)
SODIUM SERPL-SCNC: 135 MMOL/L (ref 136–145)
SODIUM SERPL-SCNC: 136 MMOL/L (ref 136–145)
SODIUM SERPL-SCNC: 137 MMOL/L (ref 136–145)
SODIUM SERPL-SCNC: 138 MMOL/L (ref 136–145)
SODIUM SERPL-SCNC: 139 MMOL/L (ref 136–145)
SODIUM SERPL-SCNC: 140 MMOL/L (ref 136–145)
SODIUM SERPL-SCNC: 140 MMOL/L (ref 136–145)
SP GR UR STRIP: 1.01 (ref 1–1.03)
SP GR UR STRIP: 1.02 (ref 1–1.03)
SP GR UR STRIP: 1.03 (ref 1–1.03)
SQUAMOUS #/AREA URNS HPF: ABNORMAL /HPF
T&S EXPIRATION DATE: NORMAL
T4 FREE SERPL-MCNC: 1.21 NG/DL (ref 0.93–1.7)
TARGETS BLD QL SMEAR: ABNORMAL
TIBC SERPL-MCNC: 197 MCG/DL (ref 298–536)
TOTAL RATE: 0 BREATHS/MINUTE
TRANSFERRIN SERPL-MCNC: 132 MG/DL (ref 200–360)
TRIGL SERPL-MCNC: 128 MG/DL (ref 0–150)
TROPONIN T SERPL-MCNC: 0.13 NG/ML (ref 0–0.03)
TROPONIN T SERPL-MCNC: 0.14 NG/ML (ref 0–0.03)
TROPONIN T SERPL-MCNC: 0.14 NG/ML (ref 0–0.03)
TROPONIN T SERPL-MCNC: 0.15 NG/ML (ref 0–0.03)
TROPONIN T SERPL-MCNC: 0.17 NG/ML (ref 0–0.03)
TROPONIN T SERPL-MCNC: 0.17 NG/ML (ref 0–0.03)
TROPONIN T SERPL-MCNC: 0.19 NG/ML (ref 0–0.03)
TROPONIN T SERPL-MCNC: 0.21 NG/ML (ref 0–0.03)
TROPONIN T SERPL-MCNC: 0.23 NG/ML (ref 0–0.03)
TROPONIN T SERPL-MCNC: 0.26 NG/ML (ref 0–0.03)
TROPONIN T SERPL-MCNC: 0.27 NG/ML (ref 0–0.03)
TSH SERPL DL<=0.05 MIU/L-ACNC: 2.7 UIU/ML (ref 0.27–4.2)
URATE CRY URNS QL MICRO: ABNORMAL /HPF
UROBILINOGEN UR QL STRIP: ABNORMAL
VIT B12 BLD-MCNC: 1817 PG/ML (ref 211–946)
VLDLC SERPL-MCNC: 23 MG/DL (ref 5–40)
WBC # BLD AUTO: 10.33 10*3/MM3 (ref 3.4–10.8)
WBC # BLD AUTO: 10.59 10*3/MM3 (ref 3.4–10.8)
WBC # BLD AUTO: 10.85 10*3/MM3 (ref 3.4–10.8)
WBC # BLD AUTO: 10.87 10*3/MM3 (ref 3.4–10.8)
WBC # BLD AUTO: 10.89 10*3/MM3 (ref 3.4–10.8)
WBC # BLD AUTO: 11 10*3/MM3 (ref 3.4–10.8)
WBC # BLD AUTO: 11.03 10*3/MM3 (ref 3.4–10.8)
WBC # BLD AUTO: 11.4 10*3/MM3 (ref 3.4–10.8)
WBC # BLD AUTO: 11.42 10*3/MM3 (ref 3.4–10.8)
WBC # BLD AUTO: 11.52 10*3/MM3 (ref 3.4–10.8)
WBC # BLD AUTO: 11.71 10*3/MM3 (ref 3.4–10.8)
WBC # BLD AUTO: 12 10*3/MM3 (ref 3.4–10.8)
WBC # BLD AUTO: 12.08 10*3/MM3 (ref 3.4–10.8)
WBC # BLD AUTO: 12.47 10*3/MM3 (ref 3.4–10.8)
WBC # BLD AUTO: 12.63 10*3/MM3 (ref 3.4–10.8)
WBC # BLD AUTO: 12.84 10*3/MM3 (ref 3.4–10.8)
WBC # BLD AUTO: 12.9 10*3/MM3 (ref 3.4–10.8)
WBC # BLD AUTO: 13.01 10*3/MM3 (ref 3.4–10.8)
WBC # BLD AUTO: 13.03 10*3/MM3 (ref 3.4–10.8)
WBC # BLD AUTO: 13.37 10*3/MM3 (ref 3.4–10.8)
WBC # BLD AUTO: 13.61 10*3/MM3 (ref 3.4–10.8)
WBC # BLD AUTO: 7.86 10*3/MM3 (ref 3.4–10.8)
WBC # BLD AUTO: 8.24 10*3/MM3 (ref 3.4–10.8)
WBC # BLD AUTO: 8.42 10*3/MM3 (ref 3.4–10.8)
WBC # BLD AUTO: 8.56 10*3/MM3 (ref 3.4–10.8)
WBC # BLD AUTO: 8.78 10*3/MM3 (ref 3.4–10.8)
WBC # BLD AUTO: 9.31 10*3/MM3 (ref 3.4–10.8)
WBC # BLD AUTO: 9.39 10*3/MM3 (ref 3.4–10.8)
WBC # BLD AUTO: 9.61 10*3/MM3 (ref 3.4–10.8)
WBC # BLD AUTO: 9.68 10*3/MM3 (ref 3.4–10.8)
WBC # BLD AUTO: 9.99 10*3/MM3 (ref 3.4–10.8)
WBC # FLD AUTO: 145 /MM3
WBC MORPH BLD: NORMAL
WBC NRBC COR # BLD: 12.42 10*3/MM3 (ref 3.4–10.8)
WBC NRBC COR # BLD: 12.8 10*3/MM3 (ref 3.4–10.8)
WBC NRBC COR # BLD: 12.85 10*3/MM3 (ref 3.4–10.8)
WBC NRBC COR # BLD: 13.3 10*3/MM3 (ref 3.4–10.8)
WBC NRBC COR # BLD: 13.46 10*3/MM3 (ref 3.4–10.8)
WBC NRBC COR # BLD: 13.55 10*3/MM3 (ref 3.4–10.8)
WBC NRBC COR # BLD: 15.63 10*3/MM3 (ref 3.4–10.8)
WBC NRBC COR # BLD: 16.27 10*3/MM3 (ref 3.4–10.8)
WBC NRBC COR # BLD: 17.18 10*3/MM3 (ref 3.4–10.8)
WBC NRBC COR # BLD: 18.59 10*3/MM3 (ref 3.4–10.8)
WBC NRBC COR # BLD: 19.04 10*3/MM3 (ref 3.4–10.8)
WBC NRBC COR # BLD: 19.43 10*3/MM3 (ref 3.4–10.8)
WBC NRBC COR # BLD: 21.15 10*3/MM3 (ref 3.4–10.8)
WBC NRBC COR # BLD: 21.38 10*3/MM3 (ref 3.4–10.8)
WBC NRBC COR # BLD: 22.2 10*3/MM3 (ref 3.4–10.8)
WBC NRBC COR # BLD: 22.59 10*3/MM3 (ref 3.4–10.8)
WBC NRBC COR # BLD: 23.1 10*3/MM3 (ref 3.4–10.8)
WBC NRBC COR # BLD: 25.19 10*3/MM3 (ref 3.4–10.8)
WBC NRBC COR # BLD: 26.16 10*3/MM3 (ref 3.4–10.8)
WBC NRBC COR # BLD: 28.34 10*3/MM3 (ref 3.4–10.8)
WBC NRBC COR # BLD: 30.13 10*3/MM3 (ref 3.4–10.8)
WBC NRBC COR # BLD: 34.76 10*3/MM3 (ref 3.4–10.8)
WBC NRBC COR # BLD: 40.44 10*3/MM3 (ref 3.4–10.8)
WBC UR QL AUTO: ABNORMAL /HPF
WHOLE BLOOD HOLD SPECIMEN: NORMAL
YEAST URNS QL MICRO: ABNORMAL /HPF

## 2020-01-01 PROCEDURE — 83735 ASSAY OF MAGNESIUM: CPT | Performed by: NURSE PRACTITIONER

## 2020-01-01 PROCEDURE — 25010000002 CEFTRIAXONE PER 250 MG: Performed by: INTERNAL MEDICINE

## 2020-01-01 PROCEDURE — 82962 GLUCOSE BLOOD TEST: CPT

## 2020-01-01 PROCEDURE — 82550 ASSAY OF CK (CPK): CPT | Performed by: INTERNAL MEDICINE

## 2020-01-01 PROCEDURE — 97110 THERAPEUTIC EXERCISES: CPT

## 2020-01-01 PROCEDURE — 25010000002 ONDANSETRON PER 1 MG: Performed by: INTERNAL MEDICINE

## 2020-01-01 PROCEDURE — 99231 SBSQ HOSP IP/OBS SF/LOW 25: CPT | Performed by: THORACIC SURGERY (CARDIOTHORACIC VASCULAR SURGERY)

## 2020-01-01 PROCEDURE — 83690 ASSAY OF LIPASE: CPT | Performed by: EMERGENCY MEDICINE

## 2020-01-01 PROCEDURE — 80053 COMPREHEN METABOLIC PANEL: CPT | Performed by: NURSE PRACTITIONER

## 2020-01-01 PROCEDURE — 36415 COLL VENOUS BLD VENIPUNCTURE: CPT

## 2020-01-01 PROCEDURE — 63710000001 INSULIN DETEMIR PER 5 UNITS: Performed by: NURSE PRACTITIONER

## 2020-01-01 PROCEDURE — 99232 SBSQ HOSP IP/OBS MODERATE 35: CPT | Performed by: INTERNAL MEDICINE

## 2020-01-01 PROCEDURE — 32551 INSERTION OF CHEST TUBE: CPT | Performed by: INTERNAL MEDICINE

## 2020-01-01 PROCEDURE — 97530 THERAPEUTIC ACTIVITIES: CPT

## 2020-01-01 PROCEDURE — 25010000002 MICAFUNGIN SODIUM 100 MG RECONSTITUTED SOLUTION: Performed by: NURSE PRACTITIONER

## 2020-01-01 PROCEDURE — 63710000001 INSULIN LISPRO (HUMAN) PER 5 UNITS: Performed by: UROLOGY

## 2020-01-01 PROCEDURE — 25010000002 LORAZEPAM PER 2 MG: Performed by: INTERNAL MEDICINE

## 2020-01-01 PROCEDURE — 93005 ELECTROCARDIOGRAM TRACING: CPT | Performed by: EMERGENCY MEDICINE

## 2020-01-01 PROCEDURE — 87070 CULTURE OTHR SPECIMN AEROBIC: CPT | Performed by: NURSE PRACTITIONER

## 2020-01-01 PROCEDURE — 85027 COMPLETE CBC AUTOMATED: CPT | Performed by: INTERNAL MEDICINE

## 2020-01-01 PROCEDURE — C9803 HOPD COVID-19 SPEC COLLECT: HCPCS | Performed by: INTERNAL MEDICINE

## 2020-01-01 PROCEDURE — 97162 PT EVAL MOD COMPLEX 30 MIN: CPT

## 2020-01-01 PROCEDURE — G0378 HOSPITAL OBSERVATION PER HR: HCPCS

## 2020-01-01 PROCEDURE — 25010000002 HEPARIN (PORCINE) PER 1000 UNITS: Performed by: NURSE PRACTITIONER

## 2020-01-01 PROCEDURE — 25010000002 FUROSEMIDE PER 20 MG: Performed by: NURSE PRACTITIONER

## 2020-01-01 PROCEDURE — 85025 COMPLETE CBC W/AUTO DIFF WBC: CPT | Performed by: NURSE PRACTITIONER

## 2020-01-01 PROCEDURE — 63710000001 INSULIN LISPRO (HUMAN) PER 5 UNITS: Performed by: NURSE PRACTITIONER

## 2020-01-01 PROCEDURE — 85730 THROMBOPLASTIN TIME PARTIAL: CPT | Performed by: NURSE PRACTITIONER

## 2020-01-01 PROCEDURE — 93005 ELECTROCARDIOGRAM TRACING: CPT

## 2020-01-01 PROCEDURE — 99285 EMERGENCY DEPT VISIT HI MDM: CPT

## 2020-01-01 PROCEDURE — 71045 X-RAY EXAM CHEST 1 VIEW: CPT

## 2020-01-01 PROCEDURE — 80048 BASIC METABOLIC PNL TOTAL CA: CPT | Performed by: INTERNAL MEDICINE

## 2020-01-01 PROCEDURE — 93926 LOWER EXTREMITY STUDY: CPT

## 2020-01-01 PROCEDURE — 25010000002 PIPERACILLIN SOD-TAZOBACTAM PER 1 G: Performed by: NURSE PRACTITIONER

## 2020-01-01 PROCEDURE — 99233 SBSQ HOSP IP/OBS HIGH 50: CPT | Performed by: INTERNAL MEDICINE

## 2020-01-01 PROCEDURE — 87040 BLOOD CULTURE FOR BACTERIA: CPT | Performed by: NURSE PRACTITIONER

## 2020-01-01 PROCEDURE — 25010000003 AMPICILLIN-SULBACTAM PER 1.5 G: Performed by: INTERNAL MEDICINE

## 2020-01-01 PROCEDURE — 25010000002 MICAFUNGIN SODIUM 100 MG RECONSTITUTED SOLUTION: Performed by: INTERNAL MEDICINE

## 2020-01-01 PROCEDURE — 25010000002 LORAZEPAM PER 2 MG: Performed by: FAMILY MEDICINE

## 2020-01-01 PROCEDURE — 63710000001 INSULIN REGULAR HUMAN PER 5 UNITS: Performed by: ANESTHESIOLOGY

## 2020-01-01 PROCEDURE — 87086 URINE CULTURE/COLONY COUNT: CPT | Performed by: EMERGENCY MEDICINE

## 2020-01-01 PROCEDURE — 25010000002 PROPOFOL 10 MG/ML EMULSION: Performed by: NURSE ANESTHETIST, CERTIFIED REGISTERED

## 2020-01-01 PROCEDURE — 25010000002 LORAZEPAM PER 2 MG: Performed by: NURSE PRACTITIONER

## 2020-01-01 PROCEDURE — 87040 BLOOD CULTURE FOR BACTERIA: CPT | Performed by: INTERNAL MEDICINE

## 2020-01-01 PROCEDURE — 99232 SBSQ HOSP IP/OBS MODERATE 35: CPT | Performed by: NURSE PRACTITIONER

## 2020-01-01 PROCEDURE — 25010000002 HYDRALAZINE PER 20 MG: Performed by: UROLOGY

## 2020-01-01 PROCEDURE — 88112 CYTOPATH CELL ENHANCE TECH: CPT | Performed by: INTERNAL MEDICINE

## 2020-01-01 PROCEDURE — 80053 COMPREHEN METABOLIC PANEL: CPT | Performed by: INTERNAL MEDICINE

## 2020-01-01 PROCEDURE — 83735 ASSAY OF MAGNESIUM: CPT | Performed by: INTERNAL MEDICINE

## 2020-01-01 PROCEDURE — 85610 PROTHROMBIN TIME: CPT | Performed by: NURSE PRACTITIONER

## 2020-01-01 PROCEDURE — 25010000002 HEPARIN (PORCINE) PER 1000 UNITS: Performed by: UROLOGY

## 2020-01-01 PROCEDURE — 25010000002 ENOXAPARIN PER 10 MG: Performed by: PHYSICIAN ASSISTANT

## 2020-01-01 PROCEDURE — 25010000002 MEROPENEM PER 100 MG: Performed by: INTERNAL MEDICINE

## 2020-01-01 PROCEDURE — 84145 PROCALCITONIN (PCT): CPT | Performed by: NURSE PRACTITIONER

## 2020-01-01 PROCEDURE — 83880 ASSAY OF NATRIURETIC PEPTIDE: CPT | Performed by: INTERNAL MEDICINE

## 2020-01-01 PROCEDURE — 85025 COMPLETE CBC W/AUTO DIFF WBC: CPT | Performed by: INTERNAL MEDICINE

## 2020-01-01 PROCEDURE — 25010000002 FUROSEMIDE PER 20 MG: Performed by: INTERNAL MEDICINE

## 2020-01-01 PROCEDURE — 63710000001 INSULIN DETEMIR PER 5 UNITS: Performed by: INTERNAL MEDICINE

## 2020-01-01 PROCEDURE — 25010000002 TDAP 5-2.5-18.5 LF-MCG/0.5 SUSPENSION: Performed by: EMERGENCY MEDICINE

## 2020-01-01 PROCEDURE — 94799 UNLISTED PULMONARY SVC/PX: CPT

## 2020-01-01 PROCEDURE — 87070 CULTURE OTHR SPECIMN AEROBIC: CPT | Performed by: INTERNAL MEDICINE

## 2020-01-01 PROCEDURE — 93010 ELECTROCARDIOGRAM REPORT: CPT | Performed by: INTERNAL MEDICINE

## 2020-01-01 PROCEDURE — 96374 THER/PROPH/DIAG INJ IV PUSH: CPT

## 2020-01-01 PROCEDURE — 85025 COMPLETE CBC W/AUTO DIFF WBC: CPT | Performed by: EMERGENCY MEDICINE

## 2020-01-01 PROCEDURE — 80048 BASIC METABOLIC PNL TOTAL CA: CPT | Performed by: NURSE PRACTITIONER

## 2020-01-01 PROCEDURE — 82010 KETONE BODYS QUAN: CPT | Performed by: INTERNAL MEDICINE

## 2020-01-01 PROCEDURE — 73630 X-RAY EXAM OF FOOT: CPT

## 2020-01-01 PROCEDURE — 25010000002 ONDANSETRON PER 1 MG: Performed by: PHYSICIAN ASSISTANT

## 2020-01-01 PROCEDURE — 85025 COMPLETE CBC W/AUTO DIFF WBC: CPT | Performed by: FAMILY MEDICINE

## 2020-01-01 PROCEDURE — 96365 THER/PROPH/DIAG IV INF INIT: CPT

## 2020-01-01 PROCEDURE — 63710000001 INSULIN LISPRO (HUMAN) PER 5 UNITS: Performed by: INTERNAL MEDICINE

## 2020-01-01 PROCEDURE — 25010000002 MEROPENEM PER 100 MG: Performed by: PHYSICIAN ASSISTANT

## 2020-01-01 PROCEDURE — 87186 SC STD MICRODIL/AGAR DIL: CPT | Performed by: UROLOGY

## 2020-01-01 PROCEDURE — 80053 COMPREHEN METABOLIC PANEL: CPT | Performed by: EMERGENCY MEDICINE

## 2020-01-01 PROCEDURE — 87186 SC STD MICRODIL/AGAR DIL: CPT | Performed by: EMERGENCY MEDICINE

## 2020-01-01 PROCEDURE — 25010000002 HEPARIN (PORCINE) PER 1000 UNITS: Performed by: INTERNAL MEDICINE

## 2020-01-01 PROCEDURE — 99284 EMERGENCY DEPT VISIT MOD MDM: CPT

## 2020-01-01 PROCEDURE — 25010000002 MIDAZOLAM PER 1 MG

## 2020-01-01 PROCEDURE — 87206 SMEAR FLUORESCENT/ACID STAI: CPT | Performed by: INTERNAL MEDICINE

## 2020-01-01 PROCEDURE — 97535 SELF CARE MNGMENT TRAINING: CPT

## 2020-01-01 PROCEDURE — 89051 BODY FLUID CELL COUNT: CPT | Performed by: INTERNAL MEDICINE

## 2020-01-01 PROCEDURE — 87086 URINE CULTURE/COLONY COUNT: CPT | Performed by: INTERNAL MEDICINE

## 2020-01-01 PROCEDURE — 70450 CT HEAD/BRAIN W/O DYE: CPT

## 2020-01-01 PROCEDURE — 87205 SMEAR GRAM STAIN: CPT | Performed by: INTERNAL MEDICINE

## 2020-01-01 PROCEDURE — 0 GADOBENATE DIMEGLUMINE 529 MG/ML SOLUTION: Performed by: INTERNAL MEDICINE

## 2020-01-01 PROCEDURE — 87015 SPECIMEN INFECT AGNT CONCNTJ: CPT | Performed by: INTERNAL MEDICINE

## 2020-01-01 PROCEDURE — 84145 PROCALCITONIN (PCT): CPT | Performed by: PHYSICIAN ASSISTANT

## 2020-01-01 PROCEDURE — 25010000002 FUROSEMIDE PER 20 MG: Performed by: FAMILY MEDICINE

## 2020-01-01 PROCEDURE — C1751 CATH, INF, PER/CENT/MIDLINE: HCPCS

## 2020-01-01 PROCEDURE — 85007 BL SMEAR W/DIFF WBC COUNT: CPT | Performed by: NURSE PRACTITIONER

## 2020-01-01 PROCEDURE — 82010 KETONE BODYS QUAN: CPT | Performed by: PHYSICIAN ASSISTANT

## 2020-01-01 PROCEDURE — 99223 1ST HOSP IP/OBS HIGH 75: CPT | Performed by: INTERNAL MEDICINE

## 2020-01-01 PROCEDURE — 87086 URINE CULTURE/COLONY COUNT: CPT | Performed by: NURSE PRACTITIONER

## 2020-01-01 PROCEDURE — 25010000002 CEFTRIAXONE PER 250 MG: Performed by: COLON & RECTAL SURGERY

## 2020-01-01 PROCEDURE — 84132 ASSAY OF SERUM POTASSIUM: CPT | Performed by: INTERNAL MEDICINE

## 2020-01-01 PROCEDURE — 99233 SBSQ HOSP IP/OBS HIGH 50: CPT | Performed by: NURSE PRACTITIONER

## 2020-01-01 PROCEDURE — 83605 ASSAY OF LACTIC ACID: CPT | Performed by: NURSE PRACTITIONER

## 2020-01-01 PROCEDURE — 99231 SBSQ HOSP IP/OBS SF/LOW 25: CPT | Performed by: NURSE PRACTITIONER

## 2020-01-01 PROCEDURE — 25010000002 KETOROLAC TROMETHAMINE PER 15 MG: Performed by: FAMILY MEDICINE

## 2020-01-01 PROCEDURE — 25010000002 FUROSEMIDE PER 20 MG: Performed by: EMERGENCY MEDICINE

## 2020-01-01 PROCEDURE — 83880 ASSAY OF NATRIURETIC PEPTIDE: CPT | Performed by: NURSE PRACTITIONER

## 2020-01-01 PROCEDURE — 87206 SMEAR FLUORESCENT/ACID STAI: CPT | Performed by: UROLOGY

## 2020-01-01 PROCEDURE — 83036 HEMOGLOBIN GLYCOSYLATED A1C: CPT | Performed by: NURSE PRACTITIONER

## 2020-01-01 PROCEDURE — 29581 APPL MULTLAYER CMPRN SYS LEG: CPT

## 2020-01-01 PROCEDURE — 82805 BLOOD GASES W/O2 SATURATION: CPT

## 2020-01-01 PROCEDURE — 87116 MYCOBACTERIA CULTURE: CPT | Performed by: UROLOGY

## 2020-01-01 PROCEDURE — 25010000002 FUROSEMIDE PER 20 MG: Performed by: PHYSICIAN ASSISTANT

## 2020-01-01 PROCEDURE — 84145 PROCALCITONIN (PCT): CPT | Performed by: INTERNAL MEDICINE

## 2020-01-01 PROCEDURE — 25010000002 CEFEPIME PER 500 MG: Performed by: INTERNAL MEDICINE

## 2020-01-01 PROCEDURE — 25010000002 VANCOMYCIN: Performed by: NURSE PRACTITIONER

## 2020-01-01 PROCEDURE — 87116 MYCOBACTERIA CULTURE: CPT | Performed by: INTERNAL MEDICINE

## 2020-01-01 PROCEDURE — 87493 C DIFF AMPLIFIED PROBE: CPT | Performed by: INTERNAL MEDICINE

## 2020-01-01 PROCEDURE — 84157 ASSAY OF PROTEIN OTHER: CPT | Performed by: INTERNAL MEDICINE

## 2020-01-01 PROCEDURE — 25010000002 VANCOMYCIN 10 G RECONSTITUTED SOLUTION

## 2020-01-01 PROCEDURE — 25010000002 VANCOMYCIN 10 G RECONSTITUTED SOLUTION: Performed by: THORACIC SURGERY (CARDIOTHORACIC VASCULAR SURGERY)

## 2020-01-01 PROCEDURE — 81001 URINALYSIS AUTO W/SCOPE: CPT | Performed by: NURSE PRACTITIONER

## 2020-01-01 PROCEDURE — 84484 ASSAY OF TROPONIN QUANT: CPT | Performed by: EMERGENCY MEDICINE

## 2020-01-01 PROCEDURE — 97164 PT RE-EVAL EST PLAN CARE: CPT

## 2020-01-01 PROCEDURE — 86850 RBC ANTIBODY SCREEN: CPT | Performed by: NURSE PRACTITIONER

## 2020-01-01 PROCEDURE — 25010000002 DAPTOMYCIN PER 1 MG: Performed by: INTERNAL MEDICINE

## 2020-01-01 PROCEDURE — 83605 ASSAY OF LACTIC ACID: CPT | Performed by: INTERNAL MEDICINE

## 2020-01-01 PROCEDURE — 25010000002 MAGNESIUM SULFATE 2 GM/50ML SOLUTION: Performed by: UROLOGY

## 2020-01-01 PROCEDURE — 82550 ASSAY OF CK (CPK): CPT | Performed by: NURSE PRACTITIONER

## 2020-01-01 PROCEDURE — 85730 THROMBOPLASTIN TIME PARTIAL: CPT | Performed by: THORACIC SURGERY (CARDIOTHORACIC VASCULAR SURGERY)

## 2020-01-01 PROCEDURE — 25010000002 VANCOMYCIN 10 G RECONSTITUTED SOLUTION: Performed by: PHYSICIAN ASSISTANT

## 2020-01-01 PROCEDURE — 86900 BLOOD TYPING SEROLOGIC ABO: CPT | Performed by: NURSE PRACTITIONER

## 2020-01-01 PROCEDURE — 63710000001 INSULIN LISPRO (HUMAN) PER 5 UNITS: Performed by: NURSE ANESTHETIST, CERTIFIED REGISTERED

## 2020-01-01 PROCEDURE — 99232 SBSQ HOSP IP/OBS MODERATE 35: CPT | Performed by: FAMILY MEDICINE

## 2020-01-01 PROCEDURE — 82820 HEMOGLOBIN-OXYGEN AFFINITY: CPT

## 2020-01-01 PROCEDURE — C1894 INTRO/SHEATH, NON-LASER: HCPCS

## 2020-01-01 PROCEDURE — 83605 ASSAY OF LACTIC ACID: CPT | Performed by: PHYSICIAN ASSISTANT

## 2020-01-01 PROCEDURE — 87205 SMEAR GRAM STAIN: CPT | Performed by: UROLOGY

## 2020-01-01 PROCEDURE — 0W9B30Z DRAINAGE OF LEFT PLEURAL CAVITY WITH DRAINAGE DEVICE, PERCUTANEOUS APPROACH: ICD-10-PCS | Performed by: INTERNAL MEDICINE

## 2020-01-01 PROCEDURE — 84484 ASSAY OF TROPONIN QUANT: CPT | Performed by: INTERNAL MEDICINE

## 2020-01-01 PROCEDURE — 25010000002 ONDANSETRON PER 1 MG: Performed by: NURSE PRACTITIONER

## 2020-01-01 PROCEDURE — 25010000002 ONDANSETRON PER 1 MG: Performed by: UROLOGY

## 2020-01-01 PROCEDURE — 83880 ASSAY OF NATRIURETIC PEPTIDE: CPT | Performed by: EMERGENCY MEDICINE

## 2020-01-01 PROCEDURE — U0004 COV-19 TEST NON-CDC HGH THRU: HCPCS | Performed by: INTERNAL MEDICINE

## 2020-01-01 PROCEDURE — 99213 OFFICE O/P EST LOW 20 MIN: CPT | Performed by: NURSE PRACTITIONER

## 2020-01-01 PROCEDURE — 81001 URINALYSIS AUTO W/SCOPE: CPT | Performed by: EMERGENCY MEDICINE

## 2020-01-01 PROCEDURE — 36600 WITHDRAWAL OF ARTERIAL BLOOD: CPT

## 2020-01-01 PROCEDURE — P9612 CATHETERIZE FOR URINE SPEC: HCPCS

## 2020-01-01 PROCEDURE — 99223 1ST HOSP IP/OBS HIGH 75: CPT | Performed by: FAMILY MEDICINE

## 2020-01-01 PROCEDURE — 97166 OT EVAL MOD COMPLEX 45 MIN: CPT

## 2020-01-01 PROCEDURE — 25010000003 AMPICILLIN-SULBACTAM PER 1.5 G: Performed by: NURSE PRACTITIONER

## 2020-01-01 PROCEDURE — 86140 C-REACTIVE PROTEIN: CPT | Performed by: INTERNAL MEDICINE

## 2020-01-01 PROCEDURE — 25010000002 HYDRALAZINE PER 20 MG: Performed by: INTERNAL MEDICINE

## 2020-01-01 PROCEDURE — 87635 SARS-COV-2 COVID-19 AMP PRB: CPT | Performed by: INTERNAL MEDICINE

## 2020-01-01 PROCEDURE — 86901 BLOOD TYPING SEROLOGIC RH(D): CPT | Performed by: NURSE PRACTITIONER

## 2020-01-01 PROCEDURE — 0 DIATRIZOATE MEGLUMINE & SODIUM PER 1 ML

## 2020-01-01 PROCEDURE — 51702 INSERT TEMP BLADDER CATH: CPT

## 2020-01-01 PROCEDURE — 0VB08ZZ EXCISION OF PROSTATE, VIA NATURAL OR ARTIFICIAL OPENING ENDOSCOPIC: ICD-10-PCS | Performed by: COLON & RECTAL SURGERY

## 2020-01-01 PROCEDURE — A9577 INJ MULTIHANCE: HCPCS | Performed by: INTERNAL MEDICINE

## 2020-01-01 PROCEDURE — 82330 ASSAY OF CALCIUM: CPT | Performed by: INTERNAL MEDICINE

## 2020-01-01 PROCEDURE — 93971 EXTREMITY STUDY: CPT | Performed by: INTERNAL MEDICINE

## 2020-01-01 PROCEDURE — U0002 COVID-19 LAB TEST NON-CDC: HCPCS

## 2020-01-01 PROCEDURE — 80069 RENAL FUNCTION PANEL: CPT | Performed by: INTERNAL MEDICINE

## 2020-01-01 PROCEDURE — 25010000002 MEROPENEM: Performed by: INTERNAL MEDICINE

## 2020-01-01 PROCEDURE — 97597 DBRDMT OPN WND 1ST 20 CM/<: CPT

## 2020-01-01 PROCEDURE — 83735 ASSAY OF MAGNESIUM: CPT | Performed by: UROLOGY

## 2020-01-01 PROCEDURE — 25010000002 MAGNESIUM SULFATE 2 GM/50ML SOLUTION: Performed by: INTERNAL MEDICINE

## 2020-01-01 PROCEDURE — 87070 CULTURE OTHR SPECIMN AEROBIC: CPT | Performed by: UROLOGY

## 2020-01-01 PROCEDURE — 97165 OT EVAL LOW COMPLEX 30 MIN: CPT

## 2020-01-01 PROCEDURE — 25010000002 DEXAMETHASONE PER 1 MG: Performed by: NURSE ANESTHETIST, CERTIFIED REGISTERED

## 2020-01-01 PROCEDURE — 93005 ELECTROCARDIOGRAM TRACING: CPT | Performed by: INTERNAL MEDICINE

## 2020-01-01 PROCEDURE — 99223 1ST HOSP IP/OBS HIGH 75: CPT | Performed by: HOSPITALIST

## 2020-01-01 PROCEDURE — 83615 LACTATE (LD) (LDH) ENZYME: CPT | Performed by: INTERNAL MEDICINE

## 2020-01-01 PROCEDURE — 82140 ASSAY OF AMMONIA: CPT | Performed by: INTERNAL MEDICINE

## 2020-01-01 PROCEDURE — 25010000002 FENTANYL CITRATE (PF) 100 MCG/2ML SOLUTION: Performed by: NURSE ANESTHETIST, CERTIFIED REGISTERED

## 2020-01-01 PROCEDURE — 25010000002 HYDRALAZINE PER 20 MG: Performed by: NURSE ANESTHETIST, CERTIFIED REGISTERED

## 2020-01-01 PROCEDURE — 99239 HOSP IP/OBS DSCHRG MGMT >30: CPT | Performed by: INTERNAL MEDICINE

## 2020-01-01 PROCEDURE — 84466 ASSAY OF TRANSFERRIN: CPT | Performed by: INTERNAL MEDICINE

## 2020-01-01 PROCEDURE — 74176 CT ABD & PELVIS W/O CONTRAST: CPT

## 2020-01-01 PROCEDURE — 25010000002 MORPHINE PER 10 MG: Performed by: NURSE PRACTITIONER

## 2020-01-01 PROCEDURE — 80048 BASIC METABOLIC PNL TOTAL CA: CPT | Performed by: UROLOGY

## 2020-01-01 PROCEDURE — 83690 ASSAY OF LIPASE: CPT | Performed by: NURSE PRACTITIONER

## 2020-01-01 PROCEDURE — 83036 HEMOGLOBIN GLYCOSYLATED A1C: CPT | Performed by: INTERNAL MEDICINE

## 2020-01-01 PROCEDURE — 87102 FUNGUS ISOLATION CULTURE: CPT | Performed by: UROLOGY

## 2020-01-01 PROCEDURE — 84100 ASSAY OF PHOSPHORUS: CPT | Performed by: INTERNAL MEDICINE

## 2020-01-01 PROCEDURE — 87176 TISSUE HOMOGENIZATION CULTR: CPT | Performed by: UROLOGY

## 2020-01-01 PROCEDURE — 99231 SBSQ HOSP IP/OBS SF/LOW 25: CPT | Performed by: INTERNAL MEDICINE

## 2020-01-01 PROCEDURE — 96367 TX/PROPH/DG ADDL SEQ IV INF: CPT

## 2020-01-01 PROCEDURE — 71250 CT THORAX DX C-: CPT

## 2020-01-01 PROCEDURE — C9803 HOPD COVID-19 SPEC COLLECT: HCPCS

## 2020-01-01 PROCEDURE — 84155 ASSAY OF PROTEIN SERUM: CPT | Performed by: INTERNAL MEDICINE

## 2020-01-01 PROCEDURE — 25010000002 MAGNESIUM SULFATE 2 GM/50ML SOLUTION: Performed by: COLON & RECTAL SURGERY

## 2020-01-01 PROCEDURE — 80053 COMPREHEN METABOLIC PANEL: CPT | Performed by: PHYSICIAN ASSISTANT

## 2020-01-01 PROCEDURE — 85610 PROTHROMBIN TIME: CPT | Performed by: INTERNAL MEDICINE

## 2020-01-01 PROCEDURE — 25010000002 HYDRALAZINE PER 20 MG: Performed by: NURSE PRACTITIONER

## 2020-01-01 PROCEDURE — 25010000002 CEFTRIAXONE PER 250 MG: Performed by: EMERGENCY MEDICINE

## 2020-01-01 PROCEDURE — 25010000002 MORPHINE PER 10 MG: Performed by: FAMILY MEDICINE

## 2020-01-01 PROCEDURE — 87040 BLOOD CULTURE FOR BACTERIA: CPT | Performed by: EMERGENCY MEDICINE

## 2020-01-01 PROCEDURE — 25010000002 HEPARIN (PORCINE) PER 1000 UNITS: Performed by: COLON & RECTAL SURGERY

## 2020-01-01 PROCEDURE — 86140 C-REACTIVE PROTEIN: CPT | Performed by: PHYSICIAN ASSISTANT

## 2020-01-01 PROCEDURE — 83986 ASSAY PH BODY FLUID NOS: CPT | Performed by: INTERNAL MEDICINE

## 2020-01-01 PROCEDURE — 84484 ASSAY OF TROPONIN QUANT: CPT | Performed by: NURSE PRACTITIONER

## 2020-01-01 PROCEDURE — 25010000002 ONDANSETRON PER 1 MG: Performed by: NURSE ANESTHETIST, CERTIFIED REGISTERED

## 2020-01-01 PROCEDURE — 0100U HC BIOFIRE FILMARRAY RESP PANEL 2: CPT | Performed by: NURSE PRACTITIONER

## 2020-01-01 PROCEDURE — 99213 OFFICE O/P EST LOW 20 MIN: CPT | Performed by: PHYSICIAN ASSISTANT

## 2020-01-01 PROCEDURE — U0002 COVID-19 LAB TEST NON-CDC: HCPCS | Performed by: INTERNAL MEDICINE

## 2020-01-01 PROCEDURE — 25010000002 PHENYLEPHRINE PER 1 ML: Performed by: NURSE ANESTHETIST, CERTIFIED REGISTERED

## 2020-01-01 PROCEDURE — 82945 GLUCOSE OTHER FLUID: CPT | Performed by: INTERNAL MEDICINE

## 2020-01-01 PROCEDURE — 85045 AUTOMATED RETICULOCYTE COUNT: CPT | Performed by: INTERNAL MEDICINE

## 2020-01-01 PROCEDURE — 11042 DBRDMT SUBQ TIS 1ST 20SQCM/<: CPT | Performed by: THORACIC SURGERY (CARDIOTHORACIC VASCULAR SURGERY)

## 2020-01-01 PROCEDURE — 05HY33Z INSERTION OF INFUSION DEVICE INTO UPPER VEIN, PERCUTANEOUS APPROACH: ICD-10-PCS | Performed by: INTERNAL MEDICINE

## 2020-01-01 PROCEDURE — 80061 LIPID PANEL: CPT | Performed by: INTERNAL MEDICINE

## 2020-01-01 PROCEDURE — 82550 ASSAY OF CK (CPK): CPT

## 2020-01-01 PROCEDURE — 85027 COMPLETE CBC AUTOMATED: CPT | Performed by: NURSE PRACTITIONER

## 2020-01-01 PROCEDURE — 63710000001 INSULIN LISPRO (HUMAN) PER 5 UNITS: Performed by: COLON & RECTAL SURGERY

## 2020-01-01 PROCEDURE — 0DJD8ZZ INSPECTION OF LOWER INTESTINAL TRACT, VIA NATURAL OR ARTIFICIAL OPENING ENDOSCOPIC: ICD-10-PCS | Performed by: UROLOGY

## 2020-01-01 PROCEDURE — 82607 VITAMIN B-12: CPT | Performed by: INTERNAL MEDICINE

## 2020-01-01 PROCEDURE — 82140 ASSAY OF AMMONIA: CPT | Performed by: EMERGENCY MEDICINE

## 2020-01-01 PROCEDURE — 90715 TDAP VACCINE 7 YRS/> IM: CPT | Performed by: EMERGENCY MEDICINE

## 2020-01-01 PROCEDURE — 29581 APPL MULTLAYER CMPRN SYS LEG: CPT | Performed by: PHYSICAL THERAPIST

## 2020-01-01 PROCEDURE — 25010000003 MAGNESIUM SULFATE 4 GM/100ML SOLUTION: Performed by: INTERNAL MEDICINE

## 2020-01-01 PROCEDURE — 87493 C DIFF AMPLIFIED PROBE: CPT | Performed by: EMERGENCY MEDICINE

## 2020-01-01 PROCEDURE — 86900 BLOOD TYPING SEROLOGIC ABO: CPT | Performed by: INTERNAL MEDICINE

## 2020-01-01 PROCEDURE — 93306 TTE W/DOPPLER COMPLETE: CPT

## 2020-01-01 PROCEDURE — 25010000002 ONDANSETRON PER 1 MG: Performed by: COLON & RECTAL SURGERY

## 2020-01-01 PROCEDURE — 02HV33Z INSERTION OF INFUSION DEVICE INTO SUPERIOR VENA CAVA, PERCUTANEOUS APPROACH: ICD-10-PCS | Performed by: INTERNAL MEDICINE

## 2020-01-01 PROCEDURE — 88305 TISSUE EXAM BY PATHOLOGIST: CPT | Performed by: UROLOGY

## 2020-01-01 PROCEDURE — 25810000003 SODIUM CHLORIDE 0.9 % WITH KCL 20 MEQ 20-0.9 MEQ/L-% SOLUTION: Performed by: NURSE PRACTITIONER

## 2020-01-01 PROCEDURE — 99239 HOSP IP/OBS DSCHRG MGMT >30: CPT | Performed by: NURSE PRACTITIONER

## 2020-01-01 PROCEDURE — 85610 PROTHROMBIN TIME: CPT | Performed by: EMERGENCY MEDICINE

## 2020-01-01 PROCEDURE — 25010000002 LORAZEPAM PER 2 MG: Performed by: UROLOGY

## 2020-01-01 PROCEDURE — 86850 RBC ANTIBODY SCREEN: CPT | Performed by: INTERNAL MEDICINE

## 2020-01-01 PROCEDURE — 25010000002 IOPAMIDOL 61 % SOLUTION: Performed by: INTERNAL MEDICINE

## 2020-01-01 PROCEDURE — 83735 ASSAY OF MAGNESIUM: CPT

## 2020-01-01 PROCEDURE — 80048 BASIC METABOLIC PNL TOTAL CA: CPT | Performed by: THORACIC SURGERY (CARDIOTHORACIC VASCULAR SURGERY)

## 2020-01-01 PROCEDURE — 84439 ASSAY OF FREE THYROXINE: CPT | Performed by: EMERGENCY MEDICINE

## 2020-01-01 PROCEDURE — 85610 PROTHROMBIN TIME: CPT | Performed by: THORACIC SURGERY (CARDIOTHORACIC VASCULAR SURGERY)

## 2020-01-01 PROCEDURE — 82728 ASSAY OF FERRITIN: CPT | Performed by: INTERNAL MEDICINE

## 2020-01-01 PROCEDURE — 87102 FUNGUS ISOLATION CULTURE: CPT | Performed by: INTERNAL MEDICINE

## 2020-01-01 PROCEDURE — 83930 ASSAY OF BLOOD OSMOLALITY: CPT | Performed by: INTERNAL MEDICINE

## 2020-01-01 PROCEDURE — U0004 COV-19 TEST NON-CDC HGH THRU: HCPCS | Performed by: NURSE PRACTITIONER

## 2020-01-01 PROCEDURE — 25010000002 PIPERACILLIN SOD-TAZOBACTAM PER 1 G: Performed by: PHYSICIAN ASSISTANT

## 2020-01-01 PROCEDURE — 83540 ASSAY OF IRON: CPT | Performed by: INTERNAL MEDICINE

## 2020-01-01 PROCEDURE — 85027 COMPLETE CBC AUTOMATED: CPT | Performed by: UROLOGY

## 2020-01-01 PROCEDURE — 25010000002 ALTEPLASE 2 MG RECONSTITUTED SOLUTION 1 EACH VIAL: Performed by: INTERNAL MEDICINE

## 2020-01-01 PROCEDURE — 85652 RBC SED RATE AUTOMATED: CPT | Performed by: INTERNAL MEDICINE

## 2020-01-01 PROCEDURE — 93005 ELECTROCARDIOGRAM TRACING: CPT | Performed by: ANESTHESIOLOGY

## 2020-01-01 PROCEDURE — 63710000001 INSULIN LISPRO (HUMAN) PER 5 UNITS: Performed by: PHYSICIAN ASSISTANT

## 2020-01-01 PROCEDURE — 80053 COMPREHEN METABOLIC PANEL: CPT

## 2020-01-01 PROCEDURE — 83036 HEMOGLOBIN GLYCOSYLATED A1C: CPT | Performed by: THORACIC SURGERY (CARDIOTHORACIC VASCULAR SURGERY)

## 2020-01-01 PROCEDURE — 82746 ASSAY OF FOLIC ACID SERUM: CPT | Performed by: INTERNAL MEDICINE

## 2020-01-01 PROCEDURE — 83880 ASSAY OF NATRIURETIC PEPTIDE: CPT | Performed by: FAMILY MEDICINE

## 2020-01-01 PROCEDURE — 97602 WOUND(S) CARE NON-SELECTIVE: CPT

## 2020-01-01 PROCEDURE — 93971 EXTREMITY STUDY: CPT

## 2020-01-01 PROCEDURE — 83605 ASSAY OF LACTIC ACID: CPT | Performed by: EMERGENCY MEDICINE

## 2020-01-01 PROCEDURE — 85025 COMPLETE CBC W/AUTO DIFF WBC: CPT

## 2020-01-01 PROCEDURE — 93306 TTE W/DOPPLER COMPLETE: CPT | Performed by: INTERNAL MEDICINE

## 2020-01-01 PROCEDURE — 86901 BLOOD TYPING SEROLOGIC RH(D): CPT | Performed by: INTERNAL MEDICINE

## 2020-01-01 PROCEDURE — 90471 IMMUNIZATION ADMIN: CPT | Performed by: EMERGENCY MEDICINE

## 2020-01-01 PROCEDURE — 80053 COMPREHEN METABOLIC PANEL: CPT | Performed by: FAMILY MEDICINE

## 2020-01-01 PROCEDURE — 85027 COMPLETE CBC AUTOMATED: CPT | Performed by: THORACIC SURGERY (CARDIOTHORACIC VASCULAR SURGERY)

## 2020-01-01 PROCEDURE — 96375 TX/PRO/DX INJ NEW DRUG ADDON: CPT

## 2020-01-01 PROCEDURE — 72197 MRI PELVIS W/O & W/DYE: CPT

## 2020-01-01 PROCEDURE — 25010000002 SUCCINYLCHOLINE PER 20 MG: Performed by: NURSE ANESTHETIST, CERTIFIED REGISTERED

## 2020-01-01 PROCEDURE — 87077 CULTURE AEROBIC IDENTIFY: CPT | Performed by: EMERGENCY MEDICINE

## 2020-01-01 PROCEDURE — 87205 SMEAR GRAM STAIN: CPT | Performed by: NURSE PRACTITIONER

## 2020-01-01 PROCEDURE — 97166 OT EVAL MOD COMPLEX 45 MIN: CPT | Performed by: OCCUPATIONAL THERAPIST

## 2020-01-01 PROCEDURE — 25010000002 PIPERACILLIN SOD-TAZOBACTAM PER 1 G: Performed by: INTERNAL MEDICINE

## 2020-01-01 PROCEDURE — 25010000002 ONDANSETRON PER 1 MG: Performed by: FAMILY MEDICINE

## 2020-01-01 PROCEDURE — 85025 COMPLETE CBC W/AUTO DIFF WBC: CPT | Performed by: PHYSICIAN ASSISTANT

## 2020-01-01 PROCEDURE — 11045 DBRDMT SUBQ TISS EACH ADDL: CPT | Performed by: THORACIC SURGERY (CARDIOTHORACIC VASCULAR SURGERY)

## 2020-01-01 PROCEDURE — 87636 SARSCOV2 & INF A&B AMP PRB: CPT | Performed by: HOSPITALIST

## 2020-01-01 PROCEDURE — 87040 BLOOD CULTURE FOR BACTERIA: CPT | Performed by: PHYSICIAN ASSISTANT

## 2020-01-01 PROCEDURE — 84443 ASSAY THYROID STIM HORMONE: CPT | Performed by: EMERGENCY MEDICINE

## 2020-01-01 PROCEDURE — U0004 COV-19 TEST NON-CDC HGH THRU: HCPCS

## 2020-01-01 PROCEDURE — 85652 RBC SED RATE AUTOMATED: CPT | Performed by: PHYSICIAN ASSISTANT

## 2020-01-01 PROCEDURE — 93005 ELECTROCARDIOGRAM TRACING: CPT | Performed by: NURSE PRACTITIONER

## 2020-01-01 PROCEDURE — 93926 LOWER EXTREMITY STUDY: CPT | Performed by: INTERNAL MEDICINE

## 2020-01-01 PROCEDURE — 83735 ASSAY OF MAGNESIUM: CPT | Performed by: EMERGENCY MEDICINE

## 2020-01-01 PROCEDURE — 99291 CRITICAL CARE FIRST HOUR: CPT | Performed by: INTERNAL MEDICINE

## 2020-01-01 PROCEDURE — 87075 CULTR BACTERIA EXCEPT BLOOD: CPT | Performed by: UROLOGY

## 2020-01-01 PROCEDURE — 71046 X-RAY EXAM CHEST 2 VIEWS: CPT

## 2020-01-01 PROCEDURE — 74178 CT ABD&PLV WO CNTR FLWD CNTR: CPT

## 2020-01-01 PROCEDURE — 70486 CT MAXILLOFACIAL W/O DYE: CPT

## 2020-01-01 PROCEDURE — 99212 OFFICE O/P EST SF 10 MIN: CPT | Performed by: THORACIC SURGERY (CARDIOTHORACIC VASCULAR SURGERY)

## 2020-01-01 RX ORDER — AMLODIPINE BESYLATE 10 MG/1
10 TABLET ORAL DAILY
Status: DISCONTINUED | OUTPATIENT
Start: 2020-01-01 | End: 2020-01-01

## 2020-01-01 RX ORDER — NYSTATIN 100000 U/G
1 CREAM TOPICAL 2 TIMES DAILY
Status: DISCONTINUED | OUTPATIENT
Start: 2020-01-01 | End: 2020-01-01 | Stop reason: HOSPADM

## 2020-01-01 RX ORDER — POTASSIUM CHLORIDE 7.45 MG/ML
10 INJECTION INTRAVENOUS
Status: DISCONTINUED | OUTPATIENT
Start: 2020-01-01 | End: 2020-01-01 | Stop reason: HOSPADM

## 2020-01-01 RX ORDER — QUETIAPINE FUMARATE 25 MG/1
12.5 TABLET, FILM COATED ORAL NIGHTLY
Qty: 30 TABLET | Refills: 0 | Status: SHIPPED | OUTPATIENT
Start: 2020-01-01

## 2020-01-01 RX ORDER — LIDOCAINE HYDROCHLORIDE 10 MG/ML
0.5 INJECTION, SOLUTION EPIDURAL; INFILTRATION; INTRACAUDAL; PERINEURAL ONCE AS NEEDED
Status: DISCONTINUED | OUTPATIENT
Start: 2020-01-01 | End: 2020-01-01 | Stop reason: HOSPADM

## 2020-01-01 RX ORDER — CHOLECALCIFEROL (VITAMIN D3) 125 MCG
5 CAPSULE ORAL NIGHTLY PRN
Status: DISCONTINUED | OUTPATIENT
Start: 2020-01-01 | End: 2020-01-01 | Stop reason: HOSPADM

## 2020-01-01 RX ORDER — FUROSEMIDE 10 MG/ML
40 INJECTION INTRAMUSCULAR; INTRAVENOUS EVERY 8 HOURS
Status: COMPLETED | OUTPATIENT
Start: 2020-01-01 | End: 2020-01-01

## 2020-01-01 RX ORDER — METOPROLOL TARTRATE 100 MG/1
100 TABLET ORAL EVERY 12 HOURS
Status: DISCONTINUED | OUTPATIENT
Start: 2020-01-01 | End: 2020-01-01 | Stop reason: HOSPADM

## 2020-01-01 RX ORDER — POTASSIUM CHLORIDE 750 MG/1
40 CAPSULE, EXTENDED RELEASE ORAL AS NEEDED
Status: DISCONTINUED | OUTPATIENT
Start: 2020-01-01 | End: 2020-01-01 | Stop reason: HOSPADM

## 2020-01-01 RX ORDER — HYDRALAZINE HYDROCHLORIDE 50 MG/1
50 TABLET, FILM COATED ORAL EVERY 8 HOURS SCHEDULED
Status: DISCONTINUED | OUTPATIENT
Start: 2020-01-01 | End: 2020-01-01

## 2020-01-01 RX ORDER — OXYCODONE HYDROCHLORIDE 5 MG/1
5 TABLET ORAL EVERY 4 HOURS PRN
Status: DISPENSED | OUTPATIENT
Start: 2020-01-01 | End: 2020-01-01

## 2020-01-01 RX ORDER — HYDRALAZINE HYDROCHLORIDE 20 MG/ML
5 INJECTION INTRAMUSCULAR; INTRAVENOUS
Status: DISCONTINUED | OUTPATIENT
Start: 2020-01-01 | End: 2020-01-01

## 2020-01-01 RX ORDER — MAGNESIUM SULFATE HEPTAHYDRATE 40 MG/ML
2 INJECTION, SOLUTION INTRAVENOUS AS NEEDED
Status: DISCONTINUED | OUTPATIENT
Start: 2020-01-01 | End: 2020-01-01 | Stop reason: HOSPADM

## 2020-01-01 RX ORDER — SODIUM CHLORIDE 0.9 % (FLUSH) 0.9 %
10 SYRINGE (ML) INJECTION EVERY 12 HOURS SCHEDULED
Status: DISCONTINUED | OUTPATIENT
Start: 2020-01-01 | End: 2020-01-01 | Stop reason: HOSPADM

## 2020-01-01 RX ORDER — FAMOTIDINE 20 MG/1
20 TABLET, FILM COATED ORAL ONCE
Status: CANCELLED | OUTPATIENT
Start: 2020-01-01 | End: 2020-01-01

## 2020-01-01 RX ORDER — LIDOCAINE HYDROCHLORIDE 20 MG/ML
JELLY TOPICAL AS NEEDED
Status: DISCONTINUED | OUTPATIENT
Start: 2020-01-01 | End: 2020-01-01 | Stop reason: HOSPADM

## 2020-01-01 RX ORDER — FUROSEMIDE 10 MG/ML
40 INJECTION INTRAMUSCULAR; INTRAVENOUS ONCE
Status: COMPLETED | OUTPATIENT
Start: 2020-01-01 | End: 2020-01-01

## 2020-01-01 RX ORDER — ONDANSETRON 2 MG/ML
4 INJECTION INTRAMUSCULAR; INTRAVENOUS ONCE
Status: COMPLETED | OUTPATIENT
Start: 2020-01-01 | End: 2020-01-01

## 2020-01-01 RX ORDER — SODIUM CHLORIDE 0.9 % (FLUSH) 0.9 %
10 SYRINGE (ML) INJECTION AS NEEDED
Status: DISCONTINUED | OUTPATIENT
Start: 2020-01-01 | End: 2020-01-01 | Stop reason: HOSPADM

## 2020-01-01 RX ORDER — ACETAMINOPHEN 650 MG/1
650 SUPPOSITORY RECTAL EVERY 4 HOURS PRN
Status: DISCONTINUED | OUTPATIENT
Start: 2020-01-01 | End: 2020-01-01 | Stop reason: HOSPADM

## 2020-01-01 RX ORDER — PROMETHAZINE HYDROCHLORIDE 25 MG/ML
6.25 INJECTION, SOLUTION INTRAMUSCULAR; INTRAVENOUS ONCE AS NEEDED
Status: DISCONTINUED | OUTPATIENT
Start: 2020-01-01 | End: 2020-01-01

## 2020-01-01 RX ORDER — SODIUM CHLORIDE 9 MG/ML
50 INJECTION, SOLUTION INTRAVENOUS CONTINUOUS
Status: DISCONTINUED | OUTPATIENT
Start: 2020-01-01 | End: 2020-01-01

## 2020-01-01 RX ORDER — FAMOTIDINE 10 MG/ML
20 INJECTION, SOLUTION INTRAVENOUS ONCE
Status: CANCELLED | OUTPATIENT
Start: 2020-01-01 | End: 2020-01-01

## 2020-01-01 RX ORDER — TERAZOSIN 5 MG/1
10 CAPSULE ORAL NIGHTLY
Status: DISCONTINUED | OUTPATIENT
Start: 2020-01-01 | End: 2020-01-01 | Stop reason: HOSPADM

## 2020-01-01 RX ORDER — SACCHAROMYCES BOULARDII 250 MG
500 CAPSULE ORAL 2 TIMES DAILY
Status: DISCONTINUED | OUTPATIENT
Start: 2020-01-01 | End: 2020-01-01 | Stop reason: HOSPADM

## 2020-01-01 RX ORDER — FENTANYL 12 UG/H
1 PATCH TRANSDERMAL
Status: COMPLETED | OUTPATIENT
Start: 2020-01-01 | End: 2020-01-01

## 2020-01-01 RX ORDER — FENTANYL 12 UG/H
1 PATCH TRANSDERMAL
Qty: 2 PATCH | Refills: 0 | Status: SHIPPED | OUTPATIENT
Start: 2020-01-01 | End: 2020-01-01 | Stop reason: HOSPADM

## 2020-01-01 RX ORDER — GABAPENTIN 300 MG/1
300 CAPSULE ORAL NIGHTLY PRN
Status: DISCONTINUED | OUTPATIENT
Start: 2020-01-01 | End: 2020-01-01 | Stop reason: HOSPADM

## 2020-01-01 RX ORDER — SODIUM CHLORIDE, SODIUM LACTATE, POTASSIUM CHLORIDE, CALCIUM CHLORIDE 600; 310; 30; 20 MG/100ML; MG/100ML; MG/100ML; MG/100ML
9 INJECTION, SOLUTION INTRAVENOUS CONTINUOUS
Status: DISCONTINUED | OUTPATIENT
Start: 2020-01-01 | End: 2020-01-01

## 2020-01-01 RX ORDER — LORAZEPAM 1 MG/1
2 TABLET ORAL
Status: DISCONTINUED | OUTPATIENT
Start: 2020-01-01 | End: 2020-01-01

## 2020-01-01 RX ORDER — DEXTROSE MONOHYDRATE 25 G/50ML
25 INJECTION, SOLUTION INTRAVENOUS
Status: DISCONTINUED | OUTPATIENT
Start: 2020-01-01 | End: 2020-01-01 | Stop reason: HOSPADM

## 2020-01-01 RX ORDER — SACCHAROMYCES BOULARDII 250 MG
250 CAPSULE ORAL 2 TIMES DAILY
Status: DISCONTINUED | OUTPATIENT
Start: 2020-01-01 | End: 2020-01-01 | Stop reason: HOSPADM

## 2020-01-01 RX ORDER — MORPHINE SULFATE 2 MG/ML
2 INJECTION, SOLUTION INTRAMUSCULAR; INTRAVENOUS EVERY 6 HOURS
Status: DISCONTINUED | OUTPATIENT
Start: 2020-01-01 | End: 2020-01-01

## 2020-01-01 RX ORDER — SODIUM CHLORIDE 0.9 % (FLUSH) 0.9 %
3 SYRINGE (ML) INJECTION EVERY 12 HOURS SCHEDULED
Status: DISCONTINUED | OUTPATIENT
Start: 2020-01-01 | End: 2020-01-01 | Stop reason: HOSPADM

## 2020-01-01 RX ORDER — MIDAZOLAM HYDROCHLORIDE 1 MG/ML
INJECTION INTRAMUSCULAR; INTRAVENOUS
Status: COMPLETED
Start: 2020-01-01 | End: 2020-01-01

## 2020-01-01 RX ORDER — AMLODIPINE BESYLATE 5 MG/1
5 TABLET ORAL DAILY
Status: DISCONTINUED | OUTPATIENT
Start: 2020-01-01 | End: 2020-01-01 | Stop reason: HOSPADM

## 2020-01-01 RX ORDER — PSEUDOEPHEDRINE HCL 30 MG
100 TABLET ORAL 2 TIMES DAILY
Start: 2020-01-01

## 2020-01-01 RX ORDER — ONDANSETRON 2 MG/ML
INJECTION INTRAMUSCULAR; INTRAVENOUS AS NEEDED
Status: DISCONTINUED | OUTPATIENT
Start: 2020-01-01 | End: 2020-01-01 | Stop reason: SURG

## 2020-01-01 RX ORDER — HYDROMORPHONE HYDROCHLORIDE 1 MG/ML
0.5 INJECTION, SOLUTION INTRAMUSCULAR; INTRAVENOUS; SUBCUTANEOUS
Status: DISCONTINUED | OUTPATIENT
Start: 2020-01-01 | End: 2020-01-01

## 2020-01-01 RX ORDER — GLYCINE 1.5 G/100ML
SOLUTION IRRIGATION AS NEEDED
Status: DISCONTINUED | OUTPATIENT
Start: 2020-01-01 | End: 2020-01-01 | Stop reason: HOSPADM

## 2020-01-01 RX ORDER — SODIUM CHLORIDE AND POTASSIUM CHLORIDE 150; 900 MG/100ML; MG/100ML
75 INJECTION, SOLUTION INTRAVENOUS CONTINUOUS
Status: ACTIVE | OUTPATIENT
Start: 2020-01-01 | End: 2020-01-01

## 2020-01-01 RX ORDER — BUMETANIDE 0.25 MG/ML
1 INJECTION INTRAMUSCULAR; INTRAVENOUS ONCE
Status: COMPLETED | OUTPATIENT
Start: 2020-01-01 | End: 2020-01-01

## 2020-01-01 RX ORDER — QUETIAPINE FUMARATE 25 MG/1
12.5 TABLET, FILM COATED ORAL NIGHTLY
Status: DISCONTINUED | OUTPATIENT
Start: 2020-01-01 | End: 2020-01-01

## 2020-01-01 RX ORDER — QUETIAPINE FUMARATE 25 MG/1
12.5 TABLET, FILM COATED ORAL NIGHTLY
Status: DISCONTINUED | OUTPATIENT
Start: 2020-01-01 | End: 2020-01-01 | Stop reason: HOSPADM

## 2020-01-01 RX ORDER — FAMOTIDINE 20 MG/1
20 TABLET, FILM COATED ORAL ONCE
Status: DISCONTINUED | OUTPATIENT
Start: 2020-01-01 | End: 2020-01-01 | Stop reason: HOSPADM

## 2020-01-01 RX ORDER — DULOXETIN HYDROCHLORIDE 30 MG/1
30 CAPSULE, DELAYED RELEASE ORAL DAILY
Status: DISCONTINUED | OUTPATIENT
Start: 2020-01-01 | End: 2020-01-01

## 2020-01-01 RX ORDER — HEPARIN SODIUM 5000 [USP'U]/ML
5000 INJECTION, SOLUTION INTRAVENOUS; SUBCUTANEOUS EVERY 12 HOURS SCHEDULED
Status: DISCONTINUED | OUTPATIENT
Start: 2020-01-01 | End: 2020-01-01 | Stop reason: HOSPADM

## 2020-01-01 RX ORDER — HYDROCODONE BITARTRATE AND ACETAMINOPHEN 5; 325 MG/1; MG/1
1 TABLET ORAL ONCE AS NEEDED
Status: DISCONTINUED | OUTPATIENT
Start: 2020-01-01 | End: 2020-01-01 | Stop reason: HOSPADM

## 2020-01-01 RX ORDER — FUROSEMIDE 40 MG/1
40 TABLET ORAL ONCE
Status: COMPLETED | OUTPATIENT
Start: 2020-01-01 | End: 2020-01-01

## 2020-01-01 RX ORDER — PROPOFOL 10 MG/ML
VIAL (ML) INTRAVENOUS AS NEEDED
Status: DISCONTINUED | OUTPATIENT
Start: 2020-01-01 | End: 2020-01-01 | Stop reason: SURG

## 2020-01-01 RX ORDER — LORAZEPAM 2 MG/ML
0.5 INJECTION INTRAMUSCULAR
Status: DISCONTINUED | OUTPATIENT
Start: 2020-01-01 | End: 2020-01-01

## 2020-01-01 RX ORDER — POTASSIUM CHLORIDE 1.5 G/1.77G
40 POWDER, FOR SOLUTION ORAL AS NEEDED
Status: DISCONTINUED | OUTPATIENT
Start: 2020-01-01 | End: 2020-01-01 | Stop reason: HOSPADM

## 2020-01-01 RX ORDER — MIDAZOLAM HYDROCHLORIDE 1 MG/ML
INJECTION INTRAMUSCULAR; INTRAVENOUS
Status: DISPENSED
Start: 2020-01-01 | End: 2020-01-01

## 2020-01-01 RX ORDER — TERAZOSIN 10 MG/1
10 CAPSULE ORAL NIGHTLY
Qty: 30 CAPSULE | Refills: 1 | Status: SHIPPED | OUTPATIENT
Start: 2020-01-01

## 2020-01-01 RX ORDER — FENTANYL 12 UG/H
1 PATCH TRANSDERMAL
Status: DISCONTINUED | OUTPATIENT
Start: 2020-01-01 | End: 2020-01-01

## 2020-01-01 RX ORDER — SPIRONOLACTONE 25 MG/1
25 TABLET ORAL DAILY
Start: 2020-01-01 | End: 2020-01-01 | Stop reason: HOSPADM

## 2020-01-01 RX ORDER — ESMOLOL HYDROCHLORIDE 10 MG/ML
INJECTION INTRAVENOUS AS NEEDED
Status: DISCONTINUED | OUTPATIENT
Start: 2020-01-01 | End: 2020-01-01 | Stop reason: SURG

## 2020-01-01 RX ORDER — AMOXICILLIN 250 MG
1 CAPSULE ORAL NIGHTLY
Status: DISCONTINUED | OUTPATIENT
Start: 2020-01-01 | End: 2020-01-01

## 2020-01-01 RX ORDER — MAGNESIUM SULFATE HEPTAHYDRATE 40 MG/ML
4 INJECTION, SOLUTION INTRAVENOUS AS NEEDED
Status: DISCONTINUED | OUTPATIENT
Start: 2020-01-01 | End: 2020-01-01 | Stop reason: HOSPADM

## 2020-01-01 RX ORDER — NICOTINE POLACRILEX 4 MG
15 LOZENGE BUCCAL
Status: DISCONTINUED | OUTPATIENT
Start: 2020-01-01 | End: 2020-01-01 | Stop reason: HOSPADM

## 2020-01-01 RX ORDER — SODIUM CHLORIDE, SODIUM LACTATE, POTASSIUM CHLORIDE, CALCIUM CHLORIDE 600; 310; 30; 20 MG/100ML; MG/100ML; MG/100ML; MG/100ML
INJECTION, SOLUTION INTRAVENOUS CONTINUOUS PRN
Status: DISCONTINUED | OUTPATIENT
Start: 2020-01-01 | End: 2020-01-01 | Stop reason: SURG

## 2020-01-01 RX ORDER — LORAZEPAM 2 MG/1
2 TABLET ORAL NIGHTLY
Qty: 1 TABLET | Refills: 0
Start: 2020-01-01 | End: 2020-01-01

## 2020-01-01 RX ORDER — QUETIAPINE FUMARATE 25 MG/1
25 TABLET, FILM COATED ORAL NIGHTLY
Status: DISCONTINUED | OUTPATIENT
Start: 2020-01-01 | End: 2020-01-01 | Stop reason: HOSPADM

## 2020-01-01 RX ORDER — SODIUM CHLORIDE 9 MG/ML
125 INJECTION, SOLUTION INTRAVENOUS CONTINUOUS
Status: DISCONTINUED | OUTPATIENT
Start: 2020-01-01 | End: 2020-01-01

## 2020-01-01 RX ORDER — HYDROCORTISONE 25 MG/G
CREAM TOPICAL 2 TIMES DAILY
Status: DISCONTINUED | OUTPATIENT
Start: 2020-01-01 | End: 2020-01-01 | Stop reason: HOSPADM

## 2020-01-01 RX ORDER — BISACODYL 10 MG
10 SUPPOSITORY, RECTAL RECTAL DAILY PRN
Status: DISCONTINUED | OUTPATIENT
Start: 2020-01-01 | End: 2020-01-01 | Stop reason: HOSPADM

## 2020-01-01 RX ORDER — LORAZEPAM 0.5 MG/1
0.25 TABLET ORAL 3 TIMES DAILY
Status: DISCONTINUED | OUTPATIENT
Start: 2020-01-01 | End: 2020-01-01

## 2020-01-01 RX ORDER — OXYCODONE AND ACETAMINOPHEN 10; 325 MG/1; MG/1
1 TABLET ORAL 2 TIMES DAILY
Status: DISCONTINUED | OUTPATIENT
Start: 2020-01-01 | End: 2020-01-01 | Stop reason: HOSPADM

## 2020-01-01 RX ORDER — ASPIRIN 81 MG/1
81 TABLET ORAL DAILY
Status: DISCONTINUED | OUTPATIENT
Start: 2020-01-01 | End: 2020-01-01 | Stop reason: HOSPADM

## 2020-01-01 RX ORDER — ACETAMINOPHEN 325 MG/1
650 TABLET ORAL EVERY 4 HOURS PRN
Status: DISCONTINUED | OUTPATIENT
Start: 2020-01-01 | End: 2020-01-01 | Stop reason: HOSPADM

## 2020-01-01 RX ORDER — IPRATROPIUM BROMIDE AND ALBUTEROL SULFATE 2.5; .5 MG/3ML; MG/3ML
3 SOLUTION RESPIRATORY (INHALATION)
Status: DISCONTINUED | OUTPATIENT
Start: 2020-01-01 | End: 2020-01-01 | Stop reason: HOSPADM

## 2020-01-01 RX ORDER — SODIUM CHLORIDE 0.9 % (FLUSH) 0.9 %
10 SYRINGE (ML) INJECTION AS NEEDED
Status: DISCONTINUED | OUTPATIENT
Start: 2020-01-01 | End: 2020-01-01

## 2020-01-01 RX ORDER — LABETALOL HYDROCHLORIDE 5 MG/ML
5 INJECTION, SOLUTION INTRAVENOUS
Status: DISCONTINUED | OUTPATIENT
Start: 2020-01-01 | End: 2020-01-01 | Stop reason: HOSPADM

## 2020-01-01 RX ORDER — LORAZEPAM 1 MG/1
1 TABLET ORAL EVERY 8 HOURS PRN
Status: DISCONTINUED | OUTPATIENT
Start: 2020-01-01 | End: 2020-01-01 | Stop reason: HOSPADM

## 2020-01-01 RX ORDER — ONDANSETRON 2 MG/ML
4 INJECTION INTRAMUSCULAR; INTRAVENOUS ONCE AS NEEDED
Status: DISCONTINUED | OUTPATIENT
Start: 2020-01-01 | End: 2020-01-01 | Stop reason: HOSPADM

## 2020-01-01 RX ORDER — OXYCODONE HYDROCHLORIDE AND ACETAMINOPHEN 5; 325 MG/1; MG/1
2 TABLET ORAL EVERY 4 HOURS PRN
Status: DISCONTINUED | OUTPATIENT
Start: 2020-01-01 | End: 2020-01-01

## 2020-01-01 RX ORDER — HYDRALAZINE HYDROCHLORIDE 10 MG/1
10 TABLET, FILM COATED ORAL EVERY 8 HOURS SCHEDULED
Status: DISCONTINUED | OUTPATIENT
Start: 2020-01-01 | End: 2020-01-01 | Stop reason: HOSPADM

## 2020-01-01 RX ORDER — POLYETHYLENE GLYCOL 3350 17 G/17G
17 POWDER, FOR SOLUTION ORAL DAILY PRN
Status: DISCONTINUED | OUTPATIENT
Start: 2020-01-01 | End: 2020-01-01

## 2020-01-01 RX ORDER — METRONIDAZOLE 500 MG/1
500 TABLET ORAL EVERY 8 HOURS SCHEDULED
Status: DISCONTINUED | OUTPATIENT
Start: 2020-01-01 | End: 2020-01-01 | Stop reason: HOSPADM

## 2020-01-01 RX ORDER — SPIRONOLACTONE 25 MG/1
25 TABLET ORAL DAILY
Status: DISCONTINUED | OUTPATIENT
Start: 2020-01-01 | End: 2020-01-01 | Stop reason: HOSPADM

## 2020-01-01 RX ORDER — METOPROLOL TARTRATE 100 MG/1
100 TABLET ORAL EVERY 12 HOURS SCHEDULED
Status: DISCONTINUED | OUTPATIENT
Start: 2020-01-01 | End: 2020-01-01 | Stop reason: HOSPADM

## 2020-01-01 RX ORDER — OXYCODONE AND ACETAMINOPHEN 10; 325 MG/1; MG/1
1 TABLET ORAL EVERY 6 HOURS PRN
Status: DISCONTINUED | OUTPATIENT
Start: 2020-01-01 | End: 2020-01-01

## 2020-01-01 RX ORDER — ACETAMINOPHEN 325 MG/1
650 TABLET ORAL EVERY 6 HOURS PRN
Status: DISCONTINUED | OUTPATIENT
Start: 2020-01-01 | End: 2020-01-01 | Stop reason: HOSPADM

## 2020-01-01 RX ORDER — LIDOCAINE HYDROCHLORIDE 10 MG/ML
0.5 INJECTION, SOLUTION EPIDURAL; INFILTRATION; INTRACAUDAL; PERINEURAL ONCE AS NEEDED
Status: CANCELLED | OUTPATIENT
Start: 2020-01-01

## 2020-01-01 RX ORDER — MORPHINE SULFATE 4 MG/ML
4 INJECTION, SOLUTION INTRAMUSCULAR; INTRAVENOUS EVERY 6 HOURS
Status: DISCONTINUED | OUTPATIENT
Start: 2020-01-01 | End: 2020-01-01 | Stop reason: HOSPADM

## 2020-01-01 RX ORDER — DEXTROSE AND SODIUM CHLORIDE 5; .45 G/100ML; G/100ML
75 INJECTION, SOLUTION INTRAVENOUS CONTINUOUS
Status: DISCONTINUED | OUTPATIENT
Start: 2020-01-01 | End: 2020-01-01

## 2020-01-01 RX ORDER — ONDANSETRON 2 MG/ML
4 INJECTION INTRAMUSCULAR; INTRAVENOUS EVERY 6 HOURS
Status: DISCONTINUED | OUTPATIENT
Start: 2020-01-01 | End: 2020-01-01

## 2020-01-01 RX ORDER — AMLODIPINE BESYLATE 5 MG/1
5 TABLET ORAL
Status: DISCONTINUED | OUTPATIENT
Start: 2020-01-01 | End: 2020-01-01 | Stop reason: HOSPADM

## 2020-01-01 RX ORDER — FUROSEMIDE 10 MG/ML
20 INJECTION INTRAMUSCULAR; INTRAVENOUS EVERY 12 HOURS
Status: DISCONTINUED | OUTPATIENT
Start: 2020-01-01 | End: 2020-01-01

## 2020-01-01 RX ORDER — QUETIAPINE FUMARATE 25 MG/1
25 TABLET, FILM COATED ORAL NIGHTLY
Status: DISCONTINUED | OUTPATIENT
Start: 2020-01-01 | End: 2020-01-01

## 2020-01-01 RX ORDER — FENTANYL CITRATE 50 UG/ML
50 INJECTION, SOLUTION INTRAMUSCULAR; INTRAVENOUS
Status: DISCONTINUED | OUTPATIENT
Start: 2020-01-01 | End: 2020-01-01

## 2020-01-01 RX ORDER — GABAPENTIN 300 MG/1
300 CAPSULE ORAL NIGHTLY PRN
Qty: 30 CAPSULE | Refills: 0 | Status: SHIPPED | OUTPATIENT
Start: 2020-01-01 | End: 2020-01-01 | Stop reason: HOSPADM

## 2020-01-01 RX ORDER — ONDANSETRON 4 MG/1
4 TABLET, FILM COATED ORAL EVERY 6 HOURS PRN
Status: DISCONTINUED | OUTPATIENT
Start: 2020-01-01 | End: 2020-01-01 | Stop reason: HOSPADM

## 2020-01-01 RX ORDER — DULOXETIN HYDROCHLORIDE 60 MG/1
60 CAPSULE, DELAYED RELEASE ORAL DAILY
Status: DISCONTINUED | OUTPATIENT
Start: 2020-01-01 | End: 2020-01-01 | Stop reason: HOSPADM

## 2020-01-01 RX ORDER — SODIUM CHLORIDE 9 MG/ML
75 INJECTION, SOLUTION INTRAVENOUS CONTINUOUS
Status: ACTIVE | OUTPATIENT
Start: 2020-01-01 | End: 2020-01-01

## 2020-01-01 RX ORDER — MORPHINE SULFATE 4 MG/ML
4 INJECTION, SOLUTION INTRAMUSCULAR; INTRAVENOUS
Status: DISCONTINUED | OUTPATIENT
Start: 2020-01-01 | End: 2020-01-01

## 2020-01-01 RX ORDER — LORAZEPAM 2 MG/ML
1 INJECTION INTRAMUSCULAR EVERY 8 HOURS
Status: DISCONTINUED | OUTPATIENT
Start: 2020-01-01 | End: 2020-01-01

## 2020-01-01 RX ORDER — LIDOCAINE HYDROCHLORIDE 10 MG/ML
0.5 INJECTION, SOLUTION EPIDURAL; INFILTRATION; INTRACAUDAL; PERINEURAL ONCE AS NEEDED
Status: COMPLETED | OUTPATIENT
Start: 2020-01-01 | End: 2020-01-01

## 2020-01-01 RX ORDER — SUCCINYLCHOLINE CHLORIDE 20 MG/ML
INJECTION INTRAMUSCULAR; INTRAVENOUS AS NEEDED
Status: DISCONTINUED | OUTPATIENT
Start: 2020-01-01 | End: 2020-01-01 | Stop reason: SURG

## 2020-01-01 RX ORDER — FUROSEMIDE 10 MG/ML
60 INJECTION INTRAMUSCULAR; INTRAVENOUS EVERY 12 HOURS
Status: COMPLETED | OUTPATIENT
Start: 2020-01-01 | End: 2020-01-01

## 2020-01-01 RX ORDER — LORAZEPAM 2 MG/ML
0.25 INJECTION INTRAMUSCULAR EVERY 6 HOURS PRN
Status: COMPLETED | OUTPATIENT
Start: 2020-01-01 | End: 2020-01-01

## 2020-01-01 RX ORDER — LABETALOL HYDROCHLORIDE 5 MG/ML
5 INJECTION, SOLUTION INTRAVENOUS
Status: DISCONTINUED | OUTPATIENT
Start: 2020-01-01 | End: 2020-01-01

## 2020-01-01 RX ORDER — LORAZEPAM 1 MG/1
1 TABLET ORAL EVERY 8 HOURS PRN
COMMUNITY
End: 2020-01-01 | Stop reason: HOSPADM

## 2020-01-01 RX ORDER — AMLODIPINE BESYLATE 10 MG/1
10 TABLET ORAL
Status: DISCONTINUED | OUTPATIENT
Start: 2020-01-01 | End: 2020-01-01 | Stop reason: HOSPADM

## 2020-01-01 RX ORDER — LORAZEPAM 2 MG/ML
2 CONCENTRATE ORAL
Status: DISCONTINUED | OUTPATIENT
Start: 2020-01-01 | End: 2020-01-01

## 2020-01-01 RX ORDER — ONDANSETRON 2 MG/ML
4 INJECTION INTRAMUSCULAR; INTRAVENOUS EVERY 6 HOURS PRN
Start: 2020-01-01

## 2020-01-01 RX ORDER — METRONIDAZOLE 500 MG/1
500 TABLET ORAL EVERY 8 HOURS SCHEDULED
Qty: 42 TABLET | Refills: 0 | Status: SHIPPED | OUTPATIENT
Start: 2020-01-01 | End: 2020-01-01

## 2020-01-01 RX ORDER — LORAZEPAM 2 MG/ML
0.5 CONCENTRATE ORAL
Status: DISCONTINUED | OUTPATIENT
Start: 2020-01-01 | End: 2020-01-01

## 2020-01-01 RX ORDER — LIDOCAINE HYDROCHLORIDE 10 MG/ML
INJECTION, SOLUTION EPIDURAL; INFILTRATION; INTRACAUDAL; PERINEURAL AS NEEDED
Status: DISCONTINUED | OUTPATIENT
Start: 2020-01-01 | End: 2020-01-01 | Stop reason: SURG

## 2020-01-01 RX ORDER — ACETAMINOPHEN 160 MG/5ML
650 SOLUTION ORAL EVERY 4 HOURS PRN
Status: DISCONTINUED | OUTPATIENT
Start: 2020-01-01 | End: 2020-01-01

## 2020-01-01 RX ORDER — GABAPENTIN 300 MG/1
300 CAPSULE ORAL NIGHTLY
Status: DISCONTINUED | OUTPATIENT
Start: 2020-01-01 | End: 2020-01-01

## 2020-01-01 RX ORDER — MORPHINE SULFATE 4 MG/ML
4 INJECTION, SOLUTION INTRAMUSCULAR; INTRAVENOUS
Status: DISCONTINUED | OUTPATIENT
Start: 2020-01-01 | End: 2020-01-01 | Stop reason: HOSPADM

## 2020-01-01 RX ORDER — AMOXICILLIN 250 MG
2 CAPSULE ORAL 2 TIMES DAILY
Status: DISCONTINUED | OUTPATIENT
Start: 2020-01-01 | End: 2020-01-01

## 2020-01-01 RX ORDER — OXYCODONE HYDROCHLORIDE 5 MG/1
5 TABLET ORAL ONCE AS NEEDED
Status: COMPLETED | OUTPATIENT
Start: 2020-01-01 | End: 2020-01-01

## 2020-01-01 RX ORDER — LIDOCAINE HYDROCHLORIDE 20 MG/ML
JELLY TOPICAL ONCE
Status: COMPLETED | OUTPATIENT
Start: 2020-01-01 | End: 2020-01-01

## 2020-01-01 RX ORDER — MORPHINE SULFATE 2 MG/ML
2 INJECTION, SOLUTION INTRAMUSCULAR; INTRAVENOUS EVERY 4 HOURS PRN
Status: DISCONTINUED | OUTPATIENT
Start: 2020-01-01 | End: 2020-01-01

## 2020-01-01 RX ORDER — CASTOR OIL AND BALSAM, PERU 788; 87 MG/G; MG/G
OINTMENT TOPICAL EVERY 12 HOURS SCHEDULED
Status: DISCONTINUED | OUTPATIENT
Start: 2020-01-01 | End: 2020-01-01 | Stop reason: HOSPADM

## 2020-01-01 RX ORDER — SODIUM CHLORIDE 0.9 % (FLUSH) 0.9 %
10 SYRINGE (ML) INJECTION EVERY 12 HOURS SCHEDULED
Status: CANCELLED | OUTPATIENT
Start: 2020-01-01

## 2020-01-01 RX ORDER — FAMOTIDINE 20 MG/1
20 TABLET, FILM COATED ORAL ONCE
Status: COMPLETED | OUTPATIENT
Start: 2020-01-01 | End: 2020-01-01

## 2020-01-01 RX ORDER — POLYVINYL ALCOHOL 14 MG/ML
1 SOLUTION/ DROPS OPHTHALMIC
Status: DISCONTINUED | OUTPATIENT
Start: 2020-01-01 | End: 2020-01-01 | Stop reason: HOSPADM

## 2020-01-01 RX ORDER — BUMETANIDE 0.25 MG/ML
1 INJECTION INTRAMUSCULAR; INTRAVENOUS
Status: DISCONTINUED | OUTPATIENT
Start: 2020-01-01 | End: 2020-01-01 | Stop reason: HOSPADM

## 2020-01-01 RX ORDER — IPRATROPIUM BROMIDE AND ALBUTEROL SULFATE 2.5; .5 MG/3ML; MG/3ML
3 SOLUTION RESPIRATORY (INHALATION) ONCE AS NEEDED
Status: DISCONTINUED | OUTPATIENT
Start: 2020-01-01 | End: 2020-01-01

## 2020-01-01 RX ORDER — CLINDAMYCIN PHOSPHATE 600 MG/50ML
600 INJECTION INTRAVENOUS EVERY 8 HOURS
Status: DISCONTINUED | OUTPATIENT
Start: 2020-01-01 | End: 2020-01-01

## 2020-01-01 RX ORDER — PROMETHAZINE HYDROCHLORIDE 25 MG/1
25 SUPPOSITORY RECTAL ONCE AS NEEDED
Status: DISCONTINUED | OUTPATIENT
Start: 2020-01-01 | End: 2020-01-01

## 2020-01-01 RX ORDER — PROMETHAZINE HYDROCHLORIDE 25 MG/1
25 TABLET ORAL ONCE AS NEEDED
Status: DISCONTINUED | OUTPATIENT
Start: 2020-01-01 | End: 2020-01-01

## 2020-01-01 RX ORDER — NALOXONE HCL 0.4 MG/ML
0.4 VIAL (ML) INJECTION AS NEEDED
Status: DISCONTINUED | OUTPATIENT
Start: 2020-01-01 | End: 2020-01-01

## 2020-01-01 RX ORDER — MORPHINE SULFATE 2 MG/ML
2 INJECTION, SOLUTION INTRAMUSCULAR; INTRAVENOUS
Status: DISCONTINUED | OUTPATIENT
Start: 2020-01-01 | End: 2020-01-01

## 2020-01-01 RX ORDER — BUMETANIDE 0.25 MG/ML
2 INJECTION INTRAMUSCULAR; INTRAVENOUS ONCE
Status: COMPLETED | OUTPATIENT
Start: 2020-01-01 | End: 2020-01-01

## 2020-01-01 RX ORDER — ACETAMINOPHEN 160 MG/5ML
650 SOLUTION ORAL EVERY 4 HOURS PRN
Status: DISCONTINUED | OUTPATIENT
Start: 2020-01-01 | End: 2020-01-01 | Stop reason: HOSPADM

## 2020-01-01 RX ORDER — OXYCODONE AND ACETAMINOPHEN 10; 325 MG/1; MG/1
1 TABLET ORAL 3 TIMES DAILY
Status: DISCONTINUED | OUTPATIENT
Start: 2020-01-01 | End: 2020-01-01

## 2020-01-01 RX ORDER — FUROSEMIDE 10 MG/ML
40 INJECTION INTRAMUSCULAR; INTRAVENOUS EVERY 12 HOURS SCHEDULED
Status: COMPLETED | OUTPATIENT
Start: 2020-01-01 | End: 2020-01-01

## 2020-01-01 RX ORDER — DOCUSATE SODIUM 100 MG/1
100 CAPSULE, LIQUID FILLED ORAL 2 TIMES DAILY
Status: DISCONTINUED | OUTPATIENT
Start: 2020-01-01 | End: 2020-01-01 | Stop reason: HOSPADM

## 2020-01-01 RX ORDER — LABETALOL HYDROCHLORIDE 5 MG/ML
10 INJECTION, SOLUTION INTRAVENOUS ONCE
Status: COMPLETED | OUTPATIENT
Start: 2020-01-01 | End: 2020-01-01

## 2020-01-01 RX ORDER — PHENOBARBITAL SODIUM 65 MG/ML
65 INJECTION INTRAMUSCULAR EVERY 6 HOURS PRN
Status: DISCONTINUED | OUTPATIENT
Start: 2020-01-01 | End: 2020-01-01 | Stop reason: HOSPADM

## 2020-01-01 RX ORDER — SODIUM CHLORIDE 9 MG/ML
9 INJECTION, SOLUTION INTRAVENOUS CONTINUOUS PRN
Status: DISCONTINUED | OUTPATIENT
Start: 2020-01-01 | End: 2020-01-01 | Stop reason: HOSPADM

## 2020-01-01 RX ORDER — CASTOR OIL AND BALSAM, PERU 788; 87 MG/G; MG/G
1 OINTMENT TOPICAL EVERY 12 HOURS SCHEDULED
Start: 2020-01-01

## 2020-01-01 RX ORDER — GLYCOPYRROLATE 0.2 MG/ML
0.2 INJECTION INTRAMUSCULAR; INTRAVENOUS
Status: DISCONTINUED | OUTPATIENT
Start: 2020-01-01 | End: 2020-01-01

## 2020-01-01 RX ORDER — OXYCODONE HYDROCHLORIDE AND ACETAMINOPHEN 5; 325 MG/1; MG/1
1 TABLET ORAL EVERY 4 HOURS PRN
Status: DISCONTINUED | OUTPATIENT
Start: 2020-01-01 | End: 2020-01-01

## 2020-01-01 RX ORDER — OXYCODONE HYDROCHLORIDE 15 MG/1
15 TABLET ORAL NIGHTLY
Status: DISCONTINUED | OUTPATIENT
Start: 2020-01-01 | End: 2020-01-01 | Stop reason: HOSPADM

## 2020-01-01 RX ORDER — IPRATROPIUM BROMIDE AND ALBUTEROL SULFATE 2.5; .5 MG/3ML; MG/3ML
3 SOLUTION RESPIRATORY (INHALATION) ONCE AS NEEDED
Status: DISCONTINUED | OUTPATIENT
Start: 2020-01-01 | End: 2020-01-01 | Stop reason: HOSPADM

## 2020-01-01 RX ORDER — FUROSEMIDE 40 MG/1
40 TABLET ORAL DAILY
Qty: 30 TABLET | Refills: 0
Start: 2020-01-01 | End: 2020-01-01 | Stop reason: HOSPADM

## 2020-01-01 RX ORDER — ACETAMINOPHEN 325 MG/1
650 TABLET ORAL EVERY 4 HOURS PRN
Status: DISCONTINUED | OUTPATIENT
Start: 2020-01-01 | End: 2020-01-01

## 2020-01-01 RX ORDER — ONDANSETRON 2 MG/ML
4 INJECTION INTRAMUSCULAR; INTRAVENOUS EVERY 6 HOURS PRN
Status: DISCONTINUED | OUTPATIENT
Start: 2020-01-01 | End: 2020-01-01 | Stop reason: HOSPADM

## 2020-01-01 RX ORDER — LORAZEPAM 2 MG/ML
2 INJECTION INTRAMUSCULAR
Status: DISCONTINUED | OUTPATIENT
Start: 2020-01-01 | End: 2020-01-01

## 2020-01-01 RX ORDER — SODIUM CHLORIDE 0.9 % (FLUSH) 0.9 %
10 SYRINGE (ML) INJECTION AS NEEDED
Status: CANCELLED | OUTPATIENT
Start: 2020-01-01

## 2020-01-01 RX ORDER — FUROSEMIDE 10 MG/ML
40 INJECTION INTRAMUSCULAR; INTRAVENOUS EVERY 12 HOURS
Status: DISCONTINUED | OUTPATIENT
Start: 2020-01-01 | End: 2020-01-01

## 2020-01-01 RX ORDER — FUROSEMIDE 10 MG/ML
40 INJECTION INTRAMUSCULAR; INTRAVENOUS EVERY 6 HOURS PRN
Status: DISCONTINUED | OUTPATIENT
Start: 2020-01-01 | End: 2020-01-01 | Stop reason: HOSPADM

## 2020-01-01 RX ORDER — OXYCODONE HYDROCHLORIDE 5 MG/1
5 TABLET ORAL EVERY 4 HOURS PRN
Status: DISCONTINUED | OUTPATIENT
Start: 2020-01-01 | End: 2020-01-01 | Stop reason: HOSPADM

## 2020-01-01 RX ORDER — SODIUM PHOSPHATE,MONO-DIBASIC 19G-7G/118
2 ENEMA (ML) RECTAL ONCE
Status: DISCONTINUED | OUTPATIENT
Start: 2020-01-01 | End: 2020-01-01

## 2020-01-01 RX ORDER — METRONIDAZOLE 500 MG/1
500 TABLET ORAL EVERY 8 HOURS SCHEDULED
Status: DISCONTINUED | OUTPATIENT
Start: 2020-01-01 | End: 2020-01-01

## 2020-01-01 RX ORDER — HEPARIN SODIUM 5000 [USP'U]/ML
5000 INJECTION, SOLUTION INTRAVENOUS; SUBCUTANEOUS EVERY 8 HOURS SCHEDULED
Start: 2020-01-01 | End: 2020-01-01 | Stop reason: HOSPADM

## 2020-01-01 RX ORDER — NICOTINE POLACRILEX 4 MG
15 LOZENGE BUCCAL
Start: 2020-01-01

## 2020-01-01 RX ORDER — OXYCODONE AND ACETAMINOPHEN 10; 325 MG/1; MG/1
1 TABLET ORAL 2 TIMES DAILY
Qty: 4 TABLET | Refills: 0 | Status: SHIPPED | OUTPATIENT
Start: 2020-01-01 | End: 2020-01-01 | Stop reason: HOSPADM

## 2020-01-01 RX ORDER — TERAZOSIN 5 MG/1
5 CAPSULE ORAL NIGHTLY
Status: DISCONTINUED | OUTPATIENT
Start: 2020-01-01 | End: 2020-01-01

## 2020-01-01 RX ORDER — AMOXICILLIN AND CLAVULANATE POTASSIUM 875; 125 MG/1; MG/1
1 TABLET, FILM COATED ORAL EVERY 12 HOURS SCHEDULED
Status: DISCONTINUED | OUTPATIENT
Start: 2020-01-01 | End: 2020-01-01 | Stop reason: HOSPADM

## 2020-01-01 RX ORDER — ATROPA BELLADONNA AND OPIUM 16.2; 3 MG/1; MG/1
30 SUPPOSITORY RECTAL EVERY 8 HOURS PRN
Status: DISPENSED | OUTPATIENT
Start: 2020-01-01 | End: 2020-01-01

## 2020-01-01 RX ORDER — OXYCODONE HYDROCHLORIDE 15 MG/1
15 TABLET ORAL NIGHTLY
Qty: 1 TABLET | Refills: 0
Start: 2020-01-01 | End: 2020-01-01

## 2020-01-01 RX ORDER — SODIUM CHLORIDE 0.9 % (FLUSH) 0.9 %
3 SYRINGE (ML) INJECTION EVERY 12 HOURS SCHEDULED
Status: DISCONTINUED | OUTPATIENT
Start: 2020-01-01 | End: 2020-01-01

## 2020-01-01 RX ORDER — MAGNESIUM SULFATE HEPTAHYDRATE 40 MG/ML
2 INJECTION, SOLUTION INTRAVENOUS AS NEEDED
Status: DISCONTINUED | OUTPATIENT
Start: 2020-01-01 | End: 2020-01-01

## 2020-01-01 RX ORDER — LORAZEPAM 0.5 MG/1
0.5 TABLET ORAL
Status: DISCONTINUED | OUTPATIENT
Start: 2020-01-01 | End: 2020-01-01

## 2020-01-01 RX ORDER — FENTANYL CITRATE 50 UG/ML
50 INJECTION, SOLUTION INTRAMUSCULAR; INTRAVENOUS
Status: DISCONTINUED | OUTPATIENT
Start: 2020-01-01 | End: 2020-01-01 | Stop reason: HOSPADM

## 2020-01-01 RX ORDER — BISACODYL 5 MG/1
5 TABLET, DELAYED RELEASE ORAL DAILY PRN
Status: DISCONTINUED | OUTPATIENT
Start: 2020-01-01 | End: 2020-01-01 | Stop reason: HOSPADM

## 2020-01-01 RX ORDER — HYDROCORTISONE 25 MG/G
CREAM TOPICAL 2 TIMES DAILY
Start: 2020-01-01

## 2020-01-01 RX ORDER — AMLODIPINE BESYLATE 5 MG/1
5 TABLET ORAL
Status: DISCONTINUED | OUTPATIENT
Start: 2020-01-01 | End: 2020-01-01

## 2020-01-01 RX ORDER — CHLORHEXIDINE GLUCONATE 500 MG/1
1 CLOTH TOPICAL EVERY 12 HOURS PRN
Status: CANCELLED | OUTPATIENT
Start: 2020-01-01

## 2020-01-01 RX ORDER — BISACODYL 5 MG/1
5 TABLET, DELAYED RELEASE ORAL DAILY PRN
Status: DISCONTINUED | OUTPATIENT
Start: 2020-01-01 | End: 2020-01-01

## 2020-01-01 RX ORDER — FUROSEMIDE 10 MG/ML
80 INJECTION INTRAMUSCULAR; INTRAVENOUS ONCE
Status: COMPLETED | OUTPATIENT
Start: 2020-01-01 | End: 2020-01-01

## 2020-01-01 RX ORDER — DOCUSATE SODIUM 100 MG/1
100 CAPSULE, LIQUID FILLED ORAL 2 TIMES DAILY
Status: DISCONTINUED | OUTPATIENT
Start: 2020-01-01 | End: 2020-01-01

## 2020-01-01 RX ORDER — TEMAZEPAM 7.5 MG/1
7.5 CAPSULE ORAL ONCE
Status: COMPLETED | OUTPATIENT
Start: 2020-01-01 | End: 2020-01-01

## 2020-01-01 RX ORDER — FUROSEMIDE 10 MG/ML
20 INJECTION INTRAMUSCULAR; INTRAVENOUS ONCE
Status: COMPLETED | OUTPATIENT
Start: 2020-01-01 | End: 2020-01-01

## 2020-01-01 RX ORDER — HYDROCODONE BITARTRATE AND ACETAMINOPHEN 5; 325 MG/1; MG/1
1 TABLET ORAL ONCE
Status: COMPLETED | OUTPATIENT
Start: 2020-01-01 | End: 2020-01-01

## 2020-01-01 RX ORDER — FENTANYL 12 UG/H
1 PATCH TRANSDERMAL
Status: DISCONTINUED | OUTPATIENT
Start: 2020-01-01 | End: 2020-01-01 | Stop reason: HOSPADM

## 2020-01-01 RX ORDER — FENTANYL CITRATE 50 UG/ML
INJECTION, SOLUTION INTRAMUSCULAR; INTRAVENOUS AS NEEDED
Status: DISCONTINUED | OUTPATIENT
Start: 2020-01-01 | End: 2020-01-01 | Stop reason: SURG

## 2020-01-01 RX ORDER — POTASSIUM CHLORIDE 1.5 G/1.77G
40 POWDER, FOR SOLUTION ORAL EVERY 8 HOURS
Status: COMPLETED | OUTPATIENT
Start: 2020-01-01 | End: 2020-01-01

## 2020-01-01 RX ORDER — ALBUTEROL SULFATE 2.5 MG/3ML
2.5 SOLUTION RESPIRATORY (INHALATION) ONCE AS NEEDED
Status: DISCONTINUED | OUTPATIENT
Start: 2020-01-01 | End: 2020-01-01 | Stop reason: HOSPADM

## 2020-01-01 RX ORDER — HEPARIN SODIUM 5000 [USP'U]/ML
5000 INJECTION, SOLUTION INTRAVENOUS; SUBCUTANEOUS EVERY 8 HOURS SCHEDULED
Status: DISCONTINUED | OUTPATIENT
Start: 2020-01-01 | End: 2020-01-01 | Stop reason: HOSPADM

## 2020-01-01 RX ORDER — ONDANSETRON 4 MG/1
4 TABLET, FILM COATED ORAL EVERY 6 HOURS PRN
Status: DISCONTINUED | OUTPATIENT
Start: 2020-01-01 | End: 2020-01-01

## 2020-01-01 RX ORDER — MAGNESIUM SULFATE HEPTAHYDRATE 40 MG/ML
4 INJECTION, SOLUTION INTRAVENOUS AS NEEDED
Status: DISCONTINUED | OUTPATIENT
Start: 2020-01-01 | End: 2020-01-01

## 2020-01-01 RX ORDER — ONDANSETRON 2 MG/ML
4 INJECTION INTRAMUSCULAR; INTRAVENOUS EVERY 6 HOURS PRN
Status: DISCONTINUED | OUTPATIENT
Start: 2020-01-01 | End: 2020-01-01 | Stop reason: SDUPTHER

## 2020-01-01 RX ORDER — ECHINACEA PURPUREA EXTRACT 125 MG
1 TABLET ORAL AS NEEDED
Status: DISCONTINUED | OUTPATIENT
Start: 2020-01-01 | End: 2020-01-01 | Stop reason: HOSPADM

## 2020-01-01 RX ORDER — OXYCODONE HYDROCHLORIDE 5 MG/1
5 TABLET ORAL EVERY 4 HOURS PRN
Qty: 12 TABLET | Refills: 0 | Status: SHIPPED | OUTPATIENT
Start: 2020-01-01 | End: 2020-01-01 | Stop reason: HOSPADM

## 2020-01-01 RX ORDER — LORAZEPAM 2 MG/ML
1 INJECTION INTRAMUSCULAR EVERY 4 HOURS PRN
Status: DISCONTINUED | OUTPATIENT
Start: 2020-01-01 | End: 2020-01-01 | Stop reason: HOSPADM

## 2020-01-01 RX ORDER — LIDOCAINE 50 MG/G
1 PATCH TOPICAL
Start: 2020-01-01

## 2020-01-01 RX ORDER — NICOTINE POLACRILEX 4 MG
15 LOZENGE BUCCAL
Status: DISCONTINUED | OUTPATIENT
Start: 2020-01-01 | End: 2020-01-01

## 2020-01-01 RX ORDER — DULOXETIN HYDROCHLORIDE 60 MG/1
60 CAPSULE, DELAYED RELEASE ORAL DAILY
Status: DISCONTINUED | OUTPATIENT
Start: 2020-01-01 | End: 2020-01-01

## 2020-01-01 RX ORDER — GABAPENTIN 100 MG/1
100 CAPSULE ORAL DAILY
Status: DISCONTINUED | OUTPATIENT
Start: 2020-01-01 | End: 2020-01-01

## 2020-01-01 RX ORDER — NYSTATIN 100000 U/G
1 CREAM TOPICAL 2 TIMES DAILY
Start: 2020-01-01

## 2020-01-01 RX ORDER — HYDROMORPHONE HYDROCHLORIDE 1 MG/ML
0.5 INJECTION, SOLUTION INTRAMUSCULAR; INTRAVENOUS; SUBCUTANEOUS
Status: DISCONTINUED | OUTPATIENT
Start: 2020-01-01 | End: 2020-01-01 | Stop reason: HOSPADM

## 2020-01-01 RX ORDER — OXYCODONE AND ACETAMINOPHEN 10; 325 MG/1; MG/1
1 TABLET ORAL 2 TIMES DAILY
Status: ON HOLD | COMMUNITY
End: 2020-01-01 | Stop reason: SDUPTHER

## 2020-01-01 RX ORDER — SODIUM CHLORIDE 0.9 % (FLUSH) 0.9 %
10 SYRINGE (ML) INJECTION EVERY 12 HOURS SCHEDULED
Status: DISCONTINUED | OUTPATIENT
Start: 2020-01-01 | End: 2020-01-01

## 2020-01-01 RX ORDER — NYSTATIN 100000 [USP'U]/G
POWDER TOPICAL EVERY 12 HOURS SCHEDULED
Status: DISCONTINUED | OUTPATIENT
Start: 2020-01-01 | End: 2020-01-01 | Stop reason: HOSPADM

## 2020-01-01 RX ORDER — CHLORHEXIDINE GLUCONATE 500 MG/1
1 CLOTH TOPICAL EVERY 12 HOURS PRN
Status: ACTIVE | OUTPATIENT
Start: 2020-01-01

## 2020-01-01 RX ORDER — SODIUM CHLORIDE 0.9 % (FLUSH) 0.9 %
3-10 SYRINGE (ML) INJECTION AS NEEDED
Status: DISCONTINUED | OUTPATIENT
Start: 2020-01-01 | End: 2020-01-01 | Stop reason: HOSPADM

## 2020-01-01 RX ORDER — KETOROLAC TROMETHAMINE 15 MG/ML
15 INJECTION, SOLUTION INTRAMUSCULAR; INTRAVENOUS EVERY 6 HOURS PRN
Status: DISCONTINUED | OUTPATIENT
Start: 2020-01-01 | End: 2020-01-01 | Stop reason: HOSPADM

## 2020-01-01 RX ORDER — ONDANSETRON 2 MG/ML
4 INJECTION INTRAMUSCULAR; INTRAVENOUS ONCE AS NEEDED
Status: DISCONTINUED | OUTPATIENT
Start: 2020-01-01 | End: 2020-01-01

## 2020-01-01 RX ORDER — GLYCOPYRROLATE 0.2 MG/ML
0.4 INJECTION INTRAMUSCULAR; INTRAVENOUS
Status: DISCONTINUED | OUTPATIENT
Start: 2020-01-01 | End: 2020-01-01

## 2020-01-01 RX ORDER — ACETAMINOPHEN 325 MG/1
650 TABLET ORAL EVERY 4 HOURS PRN
Start: 2020-01-01

## 2020-01-01 RX ORDER — DOCUSATE SODIUM 100 MG/1
100 CAPSULE, LIQUID FILLED ORAL 2 TIMES DAILY PRN
Status: DISCONTINUED | OUTPATIENT
Start: 2020-01-01 | End: 2020-01-01 | Stop reason: HOSPADM

## 2020-01-01 RX ORDER — OXYCODONE HYDROCHLORIDE AND ACETAMINOPHEN 5; 325 MG/1; MG/1
1 TABLET ORAL EVERY 6 HOURS
Status: DISCONTINUED | OUTPATIENT
Start: 2020-01-01 | End: 2020-01-01

## 2020-01-01 RX ORDER — AMOXICILLIN AND CLAVULANATE POTASSIUM 875; 125 MG/1; MG/1
1 TABLET, FILM COATED ORAL EVERY 12 HOURS SCHEDULED
Qty: 28 TABLET | Refills: 1 | Status: SHIPPED | OUTPATIENT
Start: 2020-01-01 | End: 2020-01-01 | Stop reason: HOSPADM

## 2020-01-01 RX ORDER — CHOLECALCIFEROL (VITAMIN D3) 125 MCG
5 CAPSULE ORAL NIGHTLY PRN
Status: DISCONTINUED | OUTPATIENT
Start: 2020-01-01 | End: 2020-01-01

## 2020-01-01 RX ORDER — BISACODYL 5 MG/1
5 TABLET, DELAYED RELEASE ORAL DAILY PRN
Start: 2020-01-01

## 2020-01-01 RX ORDER — HYDRALAZINE HYDROCHLORIDE 20 MG/ML
5 INJECTION INTRAMUSCULAR; INTRAVENOUS
Status: DISCONTINUED | OUTPATIENT
Start: 2020-01-01 | End: 2020-01-01 | Stop reason: HOSPADM

## 2020-01-01 RX ORDER — SODIUM CHLORIDE 0.9 % (FLUSH) 0.9 %
3-10 SYRINGE (ML) INJECTION AS NEEDED
Status: DISCONTINUED | OUTPATIENT
Start: 2020-01-01 | End: 2020-01-01

## 2020-01-01 RX ORDER — OXYCODONE HYDROCHLORIDE AND ACETAMINOPHEN 5; 325 MG/1; MG/1
1 TABLET ORAL EVERY 4 HOURS
Status: DISCONTINUED | OUTPATIENT
Start: 2020-01-01 | End: 2020-01-01

## 2020-01-01 RX ORDER — FAMOTIDINE 10 MG/ML
20 INJECTION, SOLUTION INTRAVENOUS ONCE
Status: DISCONTINUED | OUTPATIENT
Start: 2020-01-01 | End: 2020-01-01 | Stop reason: HOSPADM

## 2020-01-01 RX ORDER — METOPROLOL TARTRATE 50 MG/1
50 TABLET, FILM COATED ORAL ONCE
Status: COMPLETED | OUTPATIENT
Start: 2020-01-01 | End: 2020-01-01

## 2020-01-01 RX ORDER — HYDROCORTISONE 25 MG/G
CREAM TOPICAL 2 TIMES DAILY PRN
Status: DISCONTINUED | OUTPATIENT
Start: 2020-01-01 | End: 2020-01-01 | Stop reason: HOSPADM

## 2020-01-01 RX ORDER — LIDOCAINE 50 MG/G
1 PATCH TOPICAL
Status: DISCONTINUED | OUTPATIENT
Start: 2020-01-01 | End: 2020-01-01 | Stop reason: HOSPADM

## 2020-01-01 RX ORDER — HYDRALAZINE HYDROCHLORIDE 20 MG/ML
10 INJECTION INTRAMUSCULAR; INTRAVENOUS ONCE
Status: COMPLETED | OUTPATIENT
Start: 2020-01-01 | End: 2020-01-01

## 2020-01-01 RX ORDER — LORAZEPAM 1 MG/1
2 TABLET ORAL NIGHTLY
Status: DISCONTINUED | OUTPATIENT
Start: 2020-01-01 | End: 2020-01-01 | Stop reason: HOSPADM

## 2020-01-01 RX ORDER — SODIUM CHLORIDE 0.9 % (FLUSH) 0.9 %
20 SYRINGE (ML) INJECTION AS NEEDED
Status: DISCONTINUED | OUTPATIENT
Start: 2020-01-01 | End: 2020-01-01 | Stop reason: HOSPADM

## 2020-01-01 RX ORDER — FUROSEMIDE 40 MG/1
40 TABLET ORAL DAILY
Status: DISCONTINUED | OUTPATIENT
Start: 2020-01-01 | End: 2020-01-01 | Stop reason: HOSPADM

## 2020-01-01 RX ORDER — DEXTROSE MONOHYDRATE 25 G/50ML
25 INJECTION, SOLUTION INTRAVENOUS
Start: 2020-01-01

## 2020-01-01 RX ORDER — ESCITALOPRAM OXALATE 10 MG/1
10 TABLET ORAL DAILY
Status: DISCONTINUED | OUTPATIENT
Start: 2020-01-01 | End: 2020-01-01

## 2020-01-01 RX ORDER — DEXTROSE MONOHYDRATE 25 G/50ML
25 INJECTION, SOLUTION INTRAVENOUS
Status: DISCONTINUED | OUTPATIENT
Start: 2020-01-01 | End: 2020-01-01

## 2020-01-01 RX ORDER — METOPROLOL TARTRATE 50 MG/1
50 TABLET, FILM COATED ORAL EVERY 12 HOURS
Status: DISCONTINUED | OUTPATIENT
Start: 2020-01-01 | End: 2020-01-01

## 2020-01-01 RX ORDER — HYDROCODONE BITARTRATE AND ACETAMINOPHEN 5; 325 MG/1; MG/1
1 TABLET ORAL ONCE AS NEEDED
Status: DISCONTINUED | OUTPATIENT
Start: 2020-01-01 | End: 2020-01-01

## 2020-01-01 RX ORDER — SACCHAROMYCES BOULARDII 250 MG
250 CAPSULE ORAL 2 TIMES DAILY
Start: 2020-01-01

## 2020-01-01 RX ORDER — HYDROXYZINE HYDROCHLORIDE 25 MG/1
25 TABLET, FILM COATED ORAL ONCE
Status: COMPLETED | OUTPATIENT
Start: 2020-01-01 | End: 2020-01-01

## 2020-01-01 RX ORDER — METOPROLOL TARTRATE 50 MG/1
100 TABLET, FILM COATED ORAL ONCE
Status: COMPLETED | OUTPATIENT
Start: 2020-01-01 | End: 2020-01-01

## 2020-01-01 RX ORDER — NALOXONE HCL 0.4 MG/ML
0.4 VIAL (ML) INJECTION AS NEEDED
Status: DISCONTINUED | OUTPATIENT
Start: 2020-01-01 | End: 2020-01-01 | Stop reason: HOSPADM

## 2020-01-01 RX ORDER — HYDRALAZINE HYDROCHLORIDE 20 MG/ML
10 INJECTION INTRAMUSCULAR; INTRAVENOUS EVERY 6 HOURS PRN
Status: DISCONTINUED | OUTPATIENT
Start: 2020-01-01 | End: 2020-01-01 | Stop reason: HOSPADM

## 2020-01-01 RX ORDER — DEXAMETHASONE SODIUM PHOSPHATE 10 MG/ML
INJECTION INTRAMUSCULAR; INTRAVENOUS AS NEEDED
Status: DISCONTINUED | OUTPATIENT
Start: 2020-01-01 | End: 2020-01-01 | Stop reason: SURG

## 2020-01-01 RX ORDER — LORAZEPAM 2 MG/ML
1 INJECTION INTRAMUSCULAR EVERY 6 HOURS
Status: DISCONTINUED | OUTPATIENT
Start: 2020-01-01 | End: 2020-01-01 | Stop reason: HOSPADM

## 2020-01-01 RX ORDER — LIDOCAINE HYDROCHLORIDE 20 MG/ML
INJECTION, SOLUTION INFILTRATION; PERINEURAL AS NEEDED
Status: DISCONTINUED | OUTPATIENT
Start: 2020-01-01 | End: 2020-01-01 | Stop reason: SURG

## 2020-01-01 RX ORDER — HYDROCODONE BITARTRATE AND ACETAMINOPHEN 5; 325 MG/1; MG/1
1 TABLET ORAL EVERY 6 HOURS PRN
Status: DISCONTINUED | OUTPATIENT
Start: 2020-01-01 | End: 2020-01-01

## 2020-01-01 RX ORDER — LABETALOL HYDROCHLORIDE 5 MG/ML
10 INJECTION, SOLUTION INTRAVENOUS EVERY 4 HOURS PRN
Status: DISCONTINUED | OUTPATIENT
Start: 2020-01-01 | End: 2020-01-01 | Stop reason: HOSPADM

## 2020-01-01 RX ORDER — OXYCODONE AND ACETAMINOPHEN 10; 325 MG/1; MG/1
1 TABLET ORAL EVERY 8 HOURS PRN
Status: DISCONTINUED | OUTPATIENT
Start: 2020-01-01 | End: 2020-01-01 | Stop reason: HOSPADM

## 2020-01-01 RX ORDER — SODIUM CHLORIDE, SODIUM LACTATE, POTASSIUM CHLORIDE, CALCIUM CHLORIDE 600; 310; 30; 20 MG/100ML; MG/100ML; MG/100ML; MG/100ML
9 INJECTION, SOLUTION INTRAVENOUS CONTINUOUS
Status: CANCELLED | OUTPATIENT
Start: 2020-01-01

## 2020-01-01 RX ORDER — SCOLOPAMINE TRANSDERMAL SYSTEM 1 MG/1
1 PATCH, EXTENDED RELEASE TRANSDERMAL
Status: DISCONTINUED | OUTPATIENT
Start: 2020-01-01 | End: 2020-01-01 | Stop reason: HOSPADM

## 2020-01-01 RX ORDER — FUROSEMIDE 10 MG/ML
20 INJECTION INTRAMUSCULAR; INTRAVENOUS EVERY 12 HOURS
Status: DISCONTINUED | OUTPATIENT
Start: 2020-01-01 | End: 2020-01-01 | Stop reason: HOSPADM

## 2020-01-01 RX ORDER — OXYCODONE AND ACETAMINOPHEN 10; 325 MG/1; MG/1
1 TABLET ORAL ONCE
Status: COMPLETED | OUTPATIENT
Start: 2020-01-01 | End: 2020-01-01

## 2020-01-01 RX ADMIN — SODIUM CHLORIDE, PRESERVATIVE FREE 10 ML: 5 INJECTION INTRAVENOUS at 20:42

## 2020-01-01 RX ADMIN — AMLODIPINE BESYLATE 10 MG: 10 TABLET ORAL at 09:10

## 2020-01-01 RX ADMIN — SPIRONOLACTONE 25 MG: 25 TABLET ORAL at 08:34

## 2020-01-01 RX ADMIN — OXYCODONE HYDROCHLORIDE AND ACETAMINOPHEN 1 TABLET: 10; 325 TABLET ORAL at 03:36

## 2020-01-01 RX ADMIN — INSULIN LISPRO 2 UNITS: 100 INJECTION, SOLUTION INTRAVENOUS; SUBCUTANEOUS at 08:25

## 2020-01-01 RX ADMIN — POTASSIUM CHLORIDE 40 MEQ: 1.5 POWDER, FOR SOLUTION ORAL at 17:56

## 2020-01-01 RX ADMIN — AMLODIPINE BESYLATE 10 MG: 10 TABLET ORAL at 08:07

## 2020-01-01 RX ADMIN — INSULIN LISPRO 3 UNITS: 100 INJECTION, SOLUTION INTRAVENOUS; SUBCUTANEOUS at 08:07

## 2020-01-01 RX ADMIN — ASPIRIN 81 MG: 81 TABLET, COATED ORAL at 09:16

## 2020-01-01 RX ADMIN — HEPARIN SODIUM 5000 UNITS: 5000 INJECTION, SOLUTION INTRAVENOUS; SUBCUTANEOUS at 13:49

## 2020-01-01 RX ADMIN — PROPOFOL 200 MG: 10 INJECTION, EMULSION INTRAVENOUS at 12:40

## 2020-01-01 RX ADMIN — HEPARIN SODIUM 5000 UNITS: 5000 INJECTION, SOLUTION INTRAVENOUS; SUBCUTANEOUS at 21:42

## 2020-01-01 RX ADMIN — FENTANYL CITRATE 50 MCG: 50 INJECTION INTRAMUSCULAR; INTRAVENOUS at 13:20

## 2020-01-01 RX ADMIN — CASTOR OIL AND BALSAM, PERU: 788; 87 OINTMENT TOPICAL at 20:15

## 2020-01-01 RX ADMIN — ESCITALOPRAM OXALATE 10 MG: 10 TABLET ORAL at 08:36

## 2020-01-01 RX ADMIN — INSULIN LISPRO 2 UNITS: 100 INJECTION, SOLUTION INTRAVENOUS; SUBCUTANEOUS at 17:16

## 2020-01-01 RX ADMIN — METRONIDAZOLE 500 MG: 500 INJECTION, SOLUTION INTRAVENOUS at 04:00

## 2020-01-01 RX ADMIN — HEPARIN SODIUM 5000 UNITS: 5000 INJECTION, SOLUTION INTRAVENOUS; SUBCUTANEOUS at 05:56

## 2020-01-01 RX ADMIN — HYDRALAZINE HYDROCHLORIDE 10 MG: 10 TABLET ORAL at 13:52

## 2020-01-01 RX ADMIN — HYDRALAZINE HYDROCHLORIDE 50 MG: 50 TABLET, FILM COATED ORAL at 13:22

## 2020-01-01 RX ADMIN — FUROSEMIDE 40 MG: 10 INJECTION, SOLUTION INTRAMUSCULAR; INTRAVENOUS at 14:47

## 2020-01-01 RX ADMIN — CEFTRIAXONE SODIUM 2 G: 2 INJECTION, POWDER, FOR SOLUTION INTRAMUSCULAR; INTRAVENOUS at 10:20

## 2020-01-01 RX ADMIN — HEPARIN SODIUM 5000 UNITS: 5000 INJECTION, SOLUTION INTRAVENOUS; SUBCUTANEOUS at 13:07

## 2020-01-01 RX ADMIN — GABAPENTIN 100 MG: 100 CAPSULE ORAL at 09:27

## 2020-01-01 RX ADMIN — OXYCODONE 5 MG: 5 TABLET ORAL at 13:04

## 2020-01-01 RX ADMIN — SODIUM CHLORIDE 50 ML/HR: 9 INJECTION, SOLUTION INTRAVENOUS at 05:28

## 2020-01-01 RX ADMIN — HYDRALAZINE HYDROCHLORIDE 10 MG: 20 INJECTION INTRAMUSCULAR; INTRAVENOUS at 03:20

## 2020-01-01 RX ADMIN — INSULIN LISPRO 4 UNITS: 100 INJECTION, SOLUTION INTRAVENOUS; SUBCUTANEOUS at 17:27

## 2020-01-01 RX ADMIN — MICONAZOLE NITRATE: 2 CREAM TOPICAL at 20:14

## 2020-01-01 RX ADMIN — VANCOMYCIN HYDROCHLORIDE 250 MG: KIT at 23:05

## 2020-01-01 RX ADMIN — DULOXETINE HYDROCHLORIDE 60 MG: 60 CAPSULE, DELAYED RELEASE ORAL at 08:53

## 2020-01-01 RX ADMIN — TAZOBACTAM SODIUM AND PIPERACILLIN SODIUM 3.38 G: 375; 3 INJECTION, SOLUTION INTRAVENOUS at 13:18

## 2020-01-01 RX ADMIN — DULOXETINE HYDROCHLORIDE 60 MG: 60 CAPSULE, DELAYED RELEASE ORAL at 08:42

## 2020-01-01 RX ADMIN — INSULIN LISPRO 2 UNITS: 100 INJECTION, SOLUTION INTRAVENOUS; SUBCUTANEOUS at 12:33

## 2020-01-01 RX ADMIN — METRONIDAZOLE 500 MG: 500 INJECTION, SOLUTION INTRAVENOUS at 12:32

## 2020-01-01 RX ADMIN — IPRATROPIUM BROMIDE AND ALBUTEROL SULFATE 3 ML: 2.5; .5 SOLUTION RESPIRATORY (INHALATION) at 07:43

## 2020-01-01 RX ADMIN — CEFTRIAXONE SODIUM 2 G: 2 INJECTION, POWDER, FOR SOLUTION INTRAMUSCULAR; INTRAVENOUS at 12:28

## 2020-01-01 RX ADMIN — DULOXETINE HYDROCHLORIDE 60 MG: 60 CAPSULE, DELAYED RELEASE ORAL at 08:51

## 2020-01-01 RX ADMIN — AMPICILLIN SODIUM AND SULBACTAM SODIUM 3 G: 2; 1 INJECTION, POWDER, FOR SOLUTION INTRAMUSCULAR; INTRAVENOUS at 09:25

## 2020-01-01 RX ADMIN — TAZOBACTAM SODIUM AND PIPERACILLIN SODIUM 3.38 G: 375; 3 INJECTION, SOLUTION INTRAVENOUS at 12:55

## 2020-01-01 RX ADMIN — BUMETANIDE 2 MG: 0.25 INJECTION INTRAMUSCULAR; INTRAVENOUS at 02:23

## 2020-01-01 RX ADMIN — INSULIN DETEMIR 5 UNITS: 100 INJECTION, SOLUTION SUBCUTANEOUS at 09:22

## 2020-01-01 RX ADMIN — ENOXAPARIN SODIUM 40 MG: 40 INJECTION SUBCUTANEOUS at 08:01

## 2020-01-01 RX ADMIN — METRONIDAZOLE 500 MG: 500 INJECTION, SOLUTION INTRAVENOUS at 08:34

## 2020-01-01 RX ADMIN — INSULIN LISPRO 3 UNITS: 100 INJECTION, SOLUTION INTRAVENOUS; SUBCUTANEOUS at 18:23

## 2020-01-01 RX ADMIN — DAPTOMYCIN 400 MG: 500 INJECTION, POWDER, LYOPHILIZED, FOR SOLUTION INTRAVENOUS at 14:37

## 2020-01-01 RX ADMIN — FUROSEMIDE 20 MG: 10 INJECTION, SOLUTION INTRAMUSCULAR; INTRAVENOUS at 22:20

## 2020-01-01 RX ADMIN — HEPARIN SODIUM 5000 UNITS: 5000 INJECTION, SOLUTION INTRAVENOUS; SUBCUTANEOUS at 13:11

## 2020-01-01 RX ADMIN — CEFTRIAXONE 2 G: 2 INJECTION, POWDER, FOR SOLUTION INTRAMUSCULAR; INTRAVENOUS at 12:09

## 2020-01-01 RX ADMIN — INSULIN LISPRO 2 UNITS: 100 INJECTION, SOLUTION INTRAVENOUS; SUBCUTANEOUS at 13:02

## 2020-01-01 RX ADMIN — HEPARIN SODIUM 5000 UNITS: 5000 INJECTION, SOLUTION INTRAVENOUS; SUBCUTANEOUS at 12:07

## 2020-01-01 RX ADMIN — METRONIDAZOLE 500 MG: 500 INJECTION, SOLUTION INTRAVENOUS at 23:07

## 2020-01-01 RX ADMIN — MICAFUNGIN SODIUM 100 MG: 100 INJECTION, POWDER, LYOPHILIZED, FOR SOLUTION INTRAVENOUS at 17:57

## 2020-01-01 RX ADMIN — VANCOMYCIN HYDROCHLORIDE 125 MG: KIT at 01:16

## 2020-01-01 RX ADMIN — HYDRALAZINE HYDROCHLORIDE 10 MG: 10 TABLET ORAL at 13:04

## 2020-01-01 RX ADMIN — INSULIN LISPRO 5 UNITS: 100 INJECTION, SOLUTION INTRAVENOUS; SUBCUTANEOUS at 14:19

## 2020-01-01 RX ADMIN — MICAFUNGIN SODIUM 100 MG: 100 INJECTION, POWDER, LYOPHILIZED, FOR SOLUTION INTRAVENOUS at 08:35

## 2020-01-01 RX ADMIN — SODIUM CHLORIDE 50 ML/HR: 9 INJECTION, SOLUTION INTRAVENOUS at 09:08

## 2020-01-01 RX ADMIN — DOCUSATE SODIUM 100 MG: 100 CAPSULE ORAL at 20:55

## 2020-01-01 RX ADMIN — DULOXETINE HYDROCHLORIDE 60 MG: 60 CAPSULE, DELAYED RELEASE ORAL at 08:11

## 2020-01-01 RX ADMIN — VANCOMYCIN HYDROCHLORIDE 250 MG: KIT at 05:47

## 2020-01-01 RX ADMIN — INSULIN LISPRO 2 UNITS: 100 INJECTION, SOLUTION INTRAVENOUS; SUBCUTANEOUS at 17:41

## 2020-01-01 RX ADMIN — HEPARIN SODIUM 5000 UNITS: 5000 INJECTION, SOLUTION INTRAVENOUS; SUBCUTANEOUS at 22:03

## 2020-01-01 RX ADMIN — OXYCODONE 5 MG: 5 TABLET ORAL at 05:23

## 2020-01-01 RX ADMIN — OXYCODONE 5 MG: 5 TABLET ORAL at 16:22

## 2020-01-01 RX ADMIN — OXYCODONE 5 MG: 5 TABLET ORAL at 18:59

## 2020-01-01 RX ADMIN — HEPARIN SODIUM 5000 UNITS: 5000 INJECTION, SOLUTION INTRAVENOUS; SUBCUTANEOUS at 21:34

## 2020-01-01 RX ADMIN — CASTOR OIL AND BALSAM, PERU: 788; 87 OINTMENT TOPICAL at 20:03

## 2020-01-01 RX ADMIN — VANCOMYCIN HYDROCHLORIDE 125 MG: KIT at 17:23

## 2020-01-01 RX ADMIN — INSULIN LISPRO 3 UNITS: 100 INJECTION, SOLUTION INTRAVENOUS; SUBCUTANEOUS at 12:03

## 2020-01-01 RX ADMIN — GABAPENTIN 100 MG: 100 CAPSULE ORAL at 08:35

## 2020-01-01 RX ADMIN — VANCOMYCIN HYDROCHLORIDE 250 MG: KIT at 17:34

## 2020-01-01 RX ADMIN — LIDOCAINE HYDROCHLORIDE: 20 JELLY TOPICAL at 12:05

## 2020-01-01 RX ADMIN — METRONIDAZOLE 500 MG: 500 INJECTION, SOLUTION INTRAVENOUS at 20:51

## 2020-01-01 RX ADMIN — GABAPENTIN 300 MG: 300 CAPSULE ORAL at 20:42

## 2020-01-01 RX ADMIN — Medication 250 MG: at 21:49

## 2020-01-01 RX ADMIN — CASTOR OIL AND BALSAM, PERU: 788; 87 OINTMENT TOPICAL at 20:56

## 2020-01-01 RX ADMIN — VANCOMYCIN HYDROCHLORIDE 250 MG: KIT at 18:51

## 2020-01-01 RX ADMIN — METRONIDAZOLE 500 MG: 500 INJECTION, SOLUTION INTRAVENOUS at 16:40

## 2020-01-01 RX ADMIN — FUROSEMIDE 60 MG: 10 INJECTION, SOLUTION INTRAMUSCULAR; INTRAVENOUS at 21:44

## 2020-01-01 RX ADMIN — OXYCODONE HYDROCHLORIDE AND ACETAMINOPHEN 1 TABLET: 10; 325 TABLET ORAL at 22:51

## 2020-01-01 RX ADMIN — FAMOTIDINE 20 MG: 20 TABLET, FILM COATED ORAL at 11:41

## 2020-01-01 RX ADMIN — INSULIN LISPRO 6 UNITS: 100 INJECTION, SOLUTION INTRAVENOUS; SUBCUTANEOUS at 08:33

## 2020-01-01 RX ADMIN — DULOXETINE HYDROCHLORIDE 60 MG: 60 CAPSULE, DELAYED RELEASE ORAL at 08:45

## 2020-01-01 RX ADMIN — QUETIAPINE FUMARATE 25 MG: 25 TABLET ORAL at 21:42

## 2020-01-01 RX ADMIN — HYDRALAZINE HYDROCHLORIDE 10 MG: 10 TABLET ORAL at 13:11

## 2020-01-01 RX ADMIN — VANCOMYCIN HYDROCHLORIDE 250 MG: KIT at 06:14

## 2020-01-01 RX ADMIN — INSULIN DETEMIR 5 UNITS: 100 INJECTION, SOLUTION SUBCUTANEOUS at 23:18

## 2020-01-01 RX ADMIN — MEROPENEM 1 G: 1 INJECTION, POWDER, FOR SOLUTION INTRAVENOUS at 17:53

## 2020-01-01 RX ADMIN — METRONIDAZOLE 500 MG: 500 INJECTION, SOLUTION INTRAVENOUS at 13:05

## 2020-01-01 RX ADMIN — AMLODIPINE BESYLATE 10 MG: 10 TABLET ORAL at 10:09

## 2020-01-01 RX ADMIN — HEPARIN SODIUM 5000 UNITS: 5000 INJECTION INTRAVENOUS; SUBCUTANEOUS at 20:55

## 2020-01-01 RX ADMIN — DEXTROSE AND SODIUM CHLORIDE 75 ML/HR: 5; 450 INJECTION, SOLUTION INTRAVENOUS at 12:28

## 2020-01-01 RX ADMIN — INSULIN LISPRO 2 UNITS: 100 INJECTION, SOLUTION INTRAVENOUS; SUBCUTANEOUS at 08:26

## 2020-01-01 RX ADMIN — CASTOR OIL AND BALSAM, PERU: 788; 87 OINTMENT TOPICAL at 09:14

## 2020-01-01 RX ADMIN — LORAZEPAM 1 MG: 2 INJECTION INTRAMUSCULAR; INTRAVENOUS at 19:42

## 2020-01-01 RX ADMIN — INSULIN LISPRO 3 UNITS: 100 INJECTION, SOLUTION INTRAVENOUS; SUBCUTANEOUS at 12:15

## 2020-01-01 RX ADMIN — AMPICILLIN SODIUM AND SULBACTAM SODIUM 3 G: 2; 1 INJECTION, POWDER, FOR SOLUTION INTRAMUSCULAR; INTRAVENOUS at 20:52

## 2020-01-01 RX ADMIN — SODIUM CHLORIDE, PRESERVATIVE FREE 10 ML: 5 INJECTION INTRAVENOUS at 22:09

## 2020-01-01 RX ADMIN — SODIUM CHLORIDE, PRESERVATIVE FREE 10 ML: 5 INJECTION INTRAVENOUS at 20:43

## 2020-01-01 RX ADMIN — Medication 250 MG: at 21:23

## 2020-01-01 RX ADMIN — HEPARIN SODIUM 5000 UNITS: 5000 INJECTION, SOLUTION INTRAVENOUS; SUBCUTANEOUS at 17:33

## 2020-01-01 RX ADMIN — FENTANYL 1 PATCH: 12.5 PATCH TRANSDERMAL at 08:44

## 2020-01-01 RX ADMIN — TERAZOSIN HYDROCHLORIDE 10 MG: 5 CAPSULE ORAL at 21:00

## 2020-01-01 RX ADMIN — Medication 250 MG: at 21:00

## 2020-01-01 RX ADMIN — TERAZOSIN HYDROCHLORIDE 10 MG: 5 CAPSULE ORAL at 20:19

## 2020-01-01 RX ADMIN — ASPIRIN 81 MG: 81 TABLET, COATED ORAL at 10:09

## 2020-01-01 RX ADMIN — QUETIAPINE FUMARATE 25 MG: 25 TABLET ORAL at 20:30

## 2020-01-01 RX ADMIN — MICAFUNGIN SODIUM 100 MG: 100 INJECTION, POWDER, LYOPHILIZED, FOR SOLUTION INTRAVENOUS at 20:13

## 2020-01-01 RX ADMIN — ACETAMINOPHEN 650 MG: 325 TABLET, FILM COATED ORAL at 12:22

## 2020-01-01 RX ADMIN — INSULIN LISPRO 2 UNITS: 100 INJECTION, SOLUTION INTRAVENOUS; SUBCUTANEOUS at 08:10

## 2020-01-01 RX ADMIN — AMPICILLIN SODIUM AND SULBACTAM SODIUM 3 G: 2; 1 INJECTION, POWDER, FOR SOLUTION INTRAMUSCULAR; INTRAVENOUS at 20:39

## 2020-01-01 RX ADMIN — QUETIAPINE FUMARATE 12.5 MG: 25 TABLET ORAL at 20:39

## 2020-01-01 RX ADMIN — HEPARIN SODIUM 5000 UNITS: 5000 INJECTION, SOLUTION INTRAVENOUS; SUBCUTANEOUS at 21:04

## 2020-01-01 RX ADMIN — VANCOMYCIN HYDROCHLORIDE 250 MG: KIT at 23:43

## 2020-01-01 RX ADMIN — INSULIN LISPRO 3 UNITS: 100 INJECTION, SOLUTION INTRAVENOUS; SUBCUTANEOUS at 17:32

## 2020-01-01 RX ADMIN — VANCOMYCIN HYDROCHLORIDE 125 MG: KIT at 12:56

## 2020-01-01 RX ADMIN — MEROPENEM 1 G: 1 INJECTION, POWDER, FOR SOLUTION INTRAVENOUS at 08:43

## 2020-01-01 RX ADMIN — ASPIRIN 81 MG: 81 TABLET, COATED ORAL at 08:42

## 2020-01-01 RX ADMIN — ASPIRIN 81 MG: 81 TABLET, COATED ORAL at 08:41

## 2020-01-01 RX ADMIN — METOPROLOL TARTRATE 100 MG: 100 TABLET ORAL at 09:57

## 2020-01-01 RX ADMIN — ESMOLOL HYDROCHLORIDE 10 MG: 10 INJECTION, SOLUTION INTRAVENOUS at 12:09

## 2020-01-01 RX ADMIN — PROPOFOL 100 MG: 10 INJECTION, EMULSION INTRAVENOUS at 12:09

## 2020-01-01 RX ADMIN — MICAFUNGIN SODIUM 100 MG: 100 INJECTION, POWDER, LYOPHILIZED, FOR SOLUTION INTRAVENOUS at 22:35

## 2020-01-01 RX ADMIN — ASPIRIN 81 MG: 81 TABLET, COATED ORAL at 08:51

## 2020-01-01 RX ADMIN — POTASSIUM CHLORIDE 40 MEQ: 10 CAPSULE, COATED, EXTENDED RELEASE ORAL at 17:20

## 2020-01-01 RX ADMIN — INSULIN LISPRO 2 UNITS: 100 INJECTION, SOLUTION INTRAVENOUS; SUBCUTANEOUS at 18:18

## 2020-01-01 RX ADMIN — METRONIDAZOLE 500 MG: 500 INJECTION, SOLUTION INTRAVENOUS at 03:18

## 2020-01-01 RX ADMIN — MICAFUNGIN SODIUM 100 MG: 100 INJECTION, POWDER, LYOPHILIZED, FOR SOLUTION INTRAVENOUS at 21:46

## 2020-01-01 RX ADMIN — INSULIN LISPRO 3 UNITS: 100 INJECTION, SOLUTION INTRAVENOUS; SUBCUTANEOUS at 08:13

## 2020-01-01 RX ADMIN — AMLODIPINE BESYLATE 10 MG: 10 TABLET ORAL at 09:24

## 2020-01-01 RX ADMIN — AMLODIPINE BESYLATE 10 MG: 10 TABLET ORAL at 09:11

## 2020-01-01 RX ADMIN — OXYCODONE HYDROCHLORIDE AND ACETAMINOPHEN 1 TABLET: 10; 325 TABLET ORAL at 12:46

## 2020-01-01 RX ADMIN — INSULIN LISPRO 3 UNITS: 100 INJECTION, SOLUTION INTRAVENOUS; SUBCUTANEOUS at 09:09

## 2020-01-01 RX ADMIN — METOPROLOL TARTRATE 100 MG: 100 TABLET ORAL at 08:46

## 2020-01-01 RX ADMIN — METRONIDAZOLE 500 MG: 500 INJECTION, SOLUTION INTRAVENOUS at 23:11

## 2020-01-01 RX ADMIN — HEPARIN SODIUM 5000 UNITS: 5000 INJECTION, SOLUTION INTRAVENOUS; SUBCUTANEOUS at 22:36

## 2020-01-01 RX ADMIN — HEPARIN SODIUM 5000 UNITS: 5000 INJECTION, SOLUTION INTRAVENOUS; SUBCUTANEOUS at 20:14

## 2020-01-01 RX ADMIN — CEFTRIAXONE 2 G: 2 INJECTION, POWDER, FOR SOLUTION INTRAMUSCULAR; INTRAVENOUS at 11:38

## 2020-01-01 RX ADMIN — AMPICILLIN SODIUM AND SULBACTAM SODIUM 3 G: 2; 1 INJECTION, POWDER, FOR SOLUTION INTRAMUSCULAR; INTRAVENOUS at 03:15

## 2020-01-01 RX ADMIN — MICAFUNGIN SODIUM 100 MG: 20 INJECTION, POWDER, LYOPHILIZED, FOR SOLUTION INTRAVENOUS at 12:54

## 2020-01-01 RX ADMIN — HYDRALAZINE HYDROCHLORIDE 10 MG: 10 TABLET ORAL at 05:45

## 2020-01-01 RX ADMIN — MEROPENEM 1 G: 1 INJECTION, POWDER, FOR SOLUTION INTRAVENOUS at 17:15

## 2020-01-01 RX ADMIN — SODIUM ZIRCONIUM CYCLOSILICATE 10 G: 10 POWDER, FOR SUSPENSION ORAL at 12:18

## 2020-01-01 RX ADMIN — ASPIRIN 81 MG: 81 TABLET, COATED ORAL at 09:30

## 2020-01-01 RX ADMIN — HYDRALAZINE HYDROCHLORIDE 10 MG: 10 TABLET ORAL at 13:07

## 2020-01-01 RX ADMIN — QUETIAPINE FUMARATE 12.5 MG: 25 TABLET ORAL at 20:57

## 2020-01-01 RX ADMIN — QUETIAPINE FUMARATE 12.5 MG: 25 TABLET ORAL at 22:07

## 2020-01-01 RX ADMIN — HEPARIN SODIUM 5000 UNITS: 5000 INJECTION INTRAVENOUS; SUBCUTANEOUS at 20:21

## 2020-01-01 RX ADMIN — METOPROLOL TARTRATE 100 MG: 100 TABLET ORAL at 09:12

## 2020-01-01 RX ADMIN — TERAZOSIN HYDROCHLORIDE 10 MG: 5 CAPSULE ORAL at 21:41

## 2020-01-01 RX ADMIN — INSULIN LISPRO 2 UNITS: 100 INJECTION, SOLUTION INTRAVENOUS; SUBCUTANEOUS at 10:42

## 2020-01-01 RX ADMIN — OXYCODONE HYDROCHLORIDE AND ACETAMINOPHEN 1 TABLET: 10; 325 TABLET ORAL at 08:34

## 2020-01-01 RX ADMIN — OXYCODONE 5 MG: 5 TABLET ORAL at 01:51

## 2020-01-01 RX ADMIN — MICAFUNGIN SODIUM 100 MG: 100 INJECTION, POWDER, LYOPHILIZED, FOR SOLUTION INTRAVENOUS at 21:09

## 2020-01-01 RX ADMIN — ENOXAPARIN SODIUM 40 MG: 40 INJECTION SUBCUTANEOUS at 09:37

## 2020-01-01 RX ADMIN — HEPARIN SODIUM 5000 UNITS: 5000 INJECTION, SOLUTION INTRAVENOUS; SUBCUTANEOUS at 08:34

## 2020-01-01 RX ADMIN — Medication 250 MG: at 20:21

## 2020-01-01 RX ADMIN — METRONIDAZOLE 500 MG: 500 INJECTION, SOLUTION INTRAVENOUS at 07:33

## 2020-01-01 RX ADMIN — HEPARIN SODIUM 5000 UNITS: 5000 INJECTION, SOLUTION INTRAVENOUS; SUBCUTANEOUS at 14:13

## 2020-01-01 RX ADMIN — SODIUM CHLORIDE, PRESERVATIVE FREE 10 ML: 5 INJECTION INTRAVENOUS at 21:37

## 2020-01-01 RX ADMIN — TERAZOSIN HYDROCHLORIDE 10 MG: 5 CAPSULE ORAL at 23:23

## 2020-01-01 RX ADMIN — MEROPENEM 1 G: 1 INJECTION, POWDER, FOR SOLUTION INTRAVENOUS at 17:55

## 2020-01-01 RX ADMIN — VANCOMYCIN HYDROCHLORIDE 250 MG: KIT at 23:19

## 2020-01-01 RX ADMIN — METRONIDAZOLE 500 MG: 500 INJECTION, SOLUTION INTRAVENOUS at 21:15

## 2020-01-01 RX ADMIN — INSULIN LISPRO 3 UNITS: 100 INJECTION, SOLUTION INTRAVENOUS; SUBCUTANEOUS at 11:36

## 2020-01-01 RX ADMIN — SODIUM CHLORIDE, POTASSIUM CHLORIDE, SODIUM LACTATE AND CALCIUM CHLORIDE: 600; 310; 30; 20 INJECTION, SOLUTION INTRAVENOUS at 14:38

## 2020-01-01 RX ADMIN — MICAFUNGIN SODIUM 100 MG: 100 INJECTION, POWDER, LYOPHILIZED, FOR SOLUTION INTRAVENOUS at 09:12

## 2020-01-01 RX ADMIN — AMLODIPINE BESYLATE 5 MG: 5 TABLET ORAL at 08:02

## 2020-01-01 RX ADMIN — OXYCODONE 5 MG: 5 TABLET ORAL at 10:16

## 2020-01-01 RX ADMIN — AMLODIPINE BESYLATE 10 MG: 10 TABLET ORAL at 09:03

## 2020-01-01 RX ADMIN — OXYCODONE 5 MG: 5 TABLET ORAL at 08:58

## 2020-01-01 RX ADMIN — HEPARIN SODIUM 5000 UNITS: 5000 INJECTION, SOLUTION INTRAVENOUS; SUBCUTANEOUS at 21:41

## 2020-01-01 RX ADMIN — MICAFUNGIN SODIUM 100 MG: 20 INJECTION, POWDER, LYOPHILIZED, FOR SOLUTION INTRAVENOUS at 13:05

## 2020-01-01 RX ADMIN — MORPHINE SULFATE 4 MG: 4 INJECTION, SOLUTION INTRAMUSCULAR; INTRAVENOUS at 00:09

## 2020-01-01 RX ADMIN — INSULIN DETEMIR 10 UNITS: 100 INJECTION, SOLUTION SUBCUTANEOUS at 20:51

## 2020-01-01 RX ADMIN — VANCOMYCIN HYDROCHLORIDE 250 MG: KIT at 06:15

## 2020-01-01 RX ADMIN — MEROPENEM 1 G: 1 INJECTION, POWDER, FOR SOLUTION INTRAVENOUS at 17:16

## 2020-01-01 RX ADMIN — OXYCODONE HYDROCHLORIDE AND ACETAMINOPHEN 1 TABLET: 10; 325 TABLET ORAL at 08:13

## 2020-01-01 RX ADMIN — ASPIRIN 81 MG: 81 TABLET, COATED ORAL at 08:13

## 2020-01-01 RX ADMIN — AMPICILLIN SODIUM AND SULBACTAM SODIUM 3 G: 2; 1 INJECTION, POWDER, FOR SOLUTION INTRAMUSCULAR; INTRAVENOUS at 02:36

## 2020-01-01 RX ADMIN — OXYCODONE HYDROCHLORIDE AND ACETAMINOPHEN 1 TABLET: 10; 325 TABLET ORAL at 20:38

## 2020-01-01 RX ADMIN — SODIUM CHLORIDE, PRESERVATIVE FREE 10 ML: 5 INJECTION INTRAVENOUS at 21:36

## 2020-01-01 RX ADMIN — GABAPENTIN 100 MG: 100 CAPSULE ORAL at 08:36

## 2020-01-01 RX ADMIN — LORAZEPAM 2 MG: 1 TABLET ORAL at 20:08

## 2020-01-01 RX ADMIN — HEPARIN SODIUM 5000 UNITS: 5000 INJECTION, SOLUTION INTRAVENOUS; SUBCUTANEOUS at 22:43

## 2020-01-01 RX ADMIN — VANCOMYCIN HYDROCHLORIDE 250 MG: KIT at 05:26

## 2020-01-01 RX ADMIN — INSULIN LISPRO 2 UNITS: 100 INJECTION, SOLUTION INTRAVENOUS; SUBCUTANEOUS at 12:07

## 2020-01-01 RX ADMIN — HEPARIN SODIUM 5000 UNITS: 5000 INJECTION, SOLUTION INTRAVENOUS; SUBCUTANEOUS at 06:29

## 2020-01-01 RX ADMIN — MORPHINE SULFATE 4 MG: 4 INJECTION, SOLUTION INTRAMUSCULAR; INTRAVENOUS at 10:33

## 2020-01-01 RX ADMIN — AMLODIPINE BESYLATE 10 MG: 10 TABLET ORAL at 08:41

## 2020-01-01 RX ADMIN — FENTANYL 1 PATCH: 12 PATCH, EXTENDED RELEASE TRANSDERMAL at 08:35

## 2020-01-01 RX ADMIN — MICONAZOLE NITRATE: 2 CREAM TOPICAL at 20:16

## 2020-01-01 RX ADMIN — METOPROLOL TARTRATE 100 MG: 100 TABLET ORAL at 20:41

## 2020-01-01 RX ADMIN — OXYCODONE 5 MG: 5 TABLET ORAL at 21:15

## 2020-01-01 RX ADMIN — SODIUM CHLORIDE, PRESERVATIVE FREE 10 ML: 5 INJECTION INTRAVENOUS at 08:48

## 2020-01-01 RX ADMIN — INSULIN LISPRO 3 UNITS: 100 INJECTION, SOLUTION INTRAVENOUS; SUBCUTANEOUS at 10:00

## 2020-01-01 RX ADMIN — MEROPENEM 1 G: 1 INJECTION, POWDER, FOR SOLUTION INTRAVENOUS at 08:12

## 2020-01-01 RX ADMIN — TERAZOSIN HYDROCHLORIDE ANHYDROUS 10 MG: 5 CAPSULE ORAL at 20:08

## 2020-01-01 RX ADMIN — INSULIN LISPRO 3 UNITS: 100 INJECTION, SOLUTION INTRAVENOUS; SUBCUTANEOUS at 12:32

## 2020-01-01 RX ADMIN — Medication: at 14:18

## 2020-01-01 RX ADMIN — INSULIN LISPRO 3 UNITS: 100 INJECTION, SOLUTION INTRAVENOUS; SUBCUTANEOUS at 10:10

## 2020-01-01 RX ADMIN — INSULIN DETEMIR 5 UNITS: 100 INJECTION, SOLUTION SUBCUTANEOUS at 09:17

## 2020-01-01 RX ADMIN — TAZOBACTAM SODIUM AND PIPERACILLIN SODIUM 3.38 G: 375; 3 INJECTION, SOLUTION INTRAVENOUS at 05:34

## 2020-01-01 RX ADMIN — MICONAZOLE NITRATE 1 APPLICATION: 2 CREAM TOPICAL at 14:24

## 2020-01-01 RX ADMIN — QUETIAPINE FUMARATE 12.5 MG: 25 TABLET ORAL at 20:38

## 2020-01-01 RX ADMIN — SPIRONOLACTONE 25 MG: 25 TABLET ORAL at 08:52

## 2020-01-01 RX ADMIN — FUROSEMIDE 40 MG: 10 INJECTION, SOLUTION INTRAMUSCULAR; INTRAVENOUS at 01:16

## 2020-01-01 RX ADMIN — FUROSEMIDE 20 MG: 10 INJECTION, SOLUTION INTRAMUSCULAR; INTRAVENOUS at 23:35

## 2020-01-01 RX ADMIN — FUROSEMIDE 40 MG: 40 TABLET ORAL at 08:53

## 2020-01-01 RX ADMIN — HEPARIN SODIUM 5000 UNITS: 5000 INJECTION, SOLUTION INTRAVENOUS; SUBCUTANEOUS at 14:29

## 2020-01-01 RX ADMIN — VANCOMYCIN HYDROCHLORIDE 125 MG: KIT at 12:52

## 2020-01-01 RX ADMIN — OXYCODONE HYDROCHLORIDE AND ACETAMINOPHEN 1 TABLET: 10; 325 TABLET ORAL at 08:16

## 2020-01-01 RX ADMIN — QUETIAPINE FUMARATE 12.5 MG: 25 TABLET ORAL at 21:09

## 2020-01-01 RX ADMIN — HEPARIN SODIUM 5000 UNITS: 5000 INJECTION, SOLUTION INTRAVENOUS; SUBCUTANEOUS at 20:51

## 2020-01-01 RX ADMIN — INSULIN LISPRO 2 UNITS: 100 INJECTION, SOLUTION INTRAVENOUS; SUBCUTANEOUS at 09:23

## 2020-01-01 RX ADMIN — OXYCODONE HYDROCHLORIDE AND ACETAMINOPHEN 1 TABLET: 10; 325 TABLET ORAL at 09:53

## 2020-01-01 RX ADMIN — HEPARIN SODIUM 5000 UNITS: 5000 INJECTION, SOLUTION INTRAVENOUS; SUBCUTANEOUS at 06:44

## 2020-01-01 RX ADMIN — QUETIAPINE FUMARATE 12.5 MG: 25 TABLET ORAL at 21:26

## 2020-01-01 RX ADMIN — METOPROLOL TARTRATE 100 MG: 100 TABLET, FILM COATED ORAL at 09:02

## 2020-01-01 RX ADMIN — SODIUM CHLORIDE, PRESERVATIVE FREE 10 ML: 5 INJECTION INTRAVENOUS at 21:56

## 2020-01-01 RX ADMIN — VANCOMYCIN HYDROCHLORIDE 250 MG: KIT at 05:39

## 2020-01-01 RX ADMIN — INSULIN LISPRO 6 UNITS: 100 INJECTION, SOLUTION INTRAVENOUS; SUBCUTANEOUS at 12:00

## 2020-01-01 RX ADMIN — TERAZOSIN HYDROCHLORIDE 5 MG: 5 CAPSULE ORAL at 20:38

## 2020-01-01 RX ADMIN — AMLODIPINE BESYLATE 10 MG: 10 TABLET ORAL at 09:30

## 2020-01-01 RX ADMIN — VANCOMYCIN HYDROCHLORIDE 125 MG: KIT at 17:33

## 2020-01-01 RX ADMIN — POTASSIUM CHLORIDE 40 MEQ: 10 CAPSULE, COATED, EXTENDED RELEASE ORAL at 18:13

## 2020-01-01 RX ADMIN — SODIUM CHLORIDE, PRESERVATIVE FREE 10 ML: 5 INJECTION INTRAVENOUS at 08:08

## 2020-01-01 RX ADMIN — DOCUSATE SODIUM 50MG AND SENNOSIDES 8.6MG 2 TABLET: 8.6; 5 TABLET, FILM COATED ORAL at 20:35

## 2020-01-01 RX ADMIN — METRONIDAZOLE 500 MG: 500 INJECTION, SOLUTION INTRAVENOUS at 03:51

## 2020-01-01 RX ADMIN — QUETIAPINE FUMARATE 12.5 MG: 25 TABLET ORAL at 21:08

## 2020-01-01 RX ADMIN — MICAFUNGIN SODIUM 100 MG: 20 INJECTION, POWDER, LYOPHILIZED, FOR SOLUTION INTRAVENOUS at 11:37

## 2020-01-01 RX ADMIN — TERAZOSIN HYDROCHLORIDE 10 MG: 5 CAPSULE ORAL at 20:37

## 2020-01-01 RX ADMIN — MORPHINE SULFATE 4 MG: 4 INJECTION, SOLUTION INTRAMUSCULAR; INTRAVENOUS at 14:16

## 2020-01-01 RX ADMIN — MICONAZOLE NITRATE: 20 POWDER TOPICAL at 10:02

## 2020-01-01 RX ADMIN — INSULIN LISPRO 2 UNITS: 100 INJECTION, SOLUTION INTRAVENOUS; SUBCUTANEOUS at 17:45

## 2020-01-01 RX ADMIN — TERAZOSIN HYDROCHLORIDE 10 MG: 5 CAPSULE ORAL at 21:03

## 2020-01-01 RX ADMIN — VANCOMYCIN HYDROCHLORIDE 125 MG: KIT at 05:23

## 2020-01-01 RX ADMIN — ASPIRIN 81 MG: 81 TABLET, COATED ORAL at 10:15

## 2020-01-01 RX ADMIN — LORAZEPAM 1 MG: 1 TABLET ORAL at 23:24

## 2020-01-01 RX ADMIN — ENOXAPARIN SODIUM 40 MG: 40 INJECTION SUBCUTANEOUS at 09:27

## 2020-01-01 RX ADMIN — MICAFUNGIN SODIUM 100 MG: 100 INJECTION, POWDER, LYOPHILIZED, FOR SOLUTION INTRAVENOUS at 21:24

## 2020-01-01 RX ADMIN — OXYCODONE HYDROCHLORIDE AND ACETAMINOPHEN 1 TABLET: 5; 325 TABLET ORAL at 10:30

## 2020-01-01 RX ADMIN — OXYCODONE HYDROCHLORIDE AND ACETAMINOPHEN 1 TABLET: 10; 325 TABLET ORAL at 21:17

## 2020-01-01 RX ADMIN — MEROPENEM 1 G: 1 INJECTION, POWDER, FOR SOLUTION INTRAVENOUS at 17:11

## 2020-01-01 RX ADMIN — AMPICILLIN SODIUM AND SULBACTAM SODIUM 3 G: 2; 1 INJECTION, POWDER, FOR SOLUTION INTRAMUSCULAR; INTRAVENOUS at 13:04

## 2020-01-01 RX ADMIN — MIDAZOLAM 2 MG: 1 INJECTION INTRAMUSCULAR; INTRAVENOUS at 05:26

## 2020-01-01 RX ADMIN — Medication 10 ML: at 11:23

## 2020-01-01 RX ADMIN — AMLODIPINE BESYLATE 10 MG: 10 TABLET ORAL at 09:59

## 2020-01-01 RX ADMIN — OXYCODONE HYDROCHLORIDE AND ACETAMINOPHEN 1 TABLET: 10; 325 TABLET ORAL at 08:42

## 2020-01-01 RX ADMIN — SODIUM CHLORIDE 50 ML/HR: 9 INJECTION, SOLUTION INTRAVENOUS at 02:17

## 2020-01-01 RX ADMIN — VANCOMYCIN HYDROCHLORIDE 250 MG: KIT at 11:29

## 2020-01-01 RX ADMIN — TERAZOSIN HYDROCHLORIDE 10 MG: 5 CAPSULE ORAL at 20:29

## 2020-01-01 RX ADMIN — VANCOMYCIN HYDROCHLORIDE 250 MG: KIT at 06:45

## 2020-01-01 RX ADMIN — PROPOFOL 20 MG: 10 INJECTION, EMULSION INTRAVENOUS at 14:43

## 2020-01-01 RX ADMIN — METOPROLOL TARTRATE 100 MG: 100 TABLET ORAL at 21:48

## 2020-01-01 RX ADMIN — FUROSEMIDE 40 MG: 40 TABLET ORAL at 08:42

## 2020-01-01 RX ADMIN — MEROPENEM 1 G: 1 INJECTION, POWDER, FOR SOLUTION INTRAVENOUS at 08:18

## 2020-01-01 RX ADMIN — INSULIN LISPRO 2 UNITS: 100 INJECTION, SOLUTION INTRAVENOUS; SUBCUTANEOUS at 12:21

## 2020-01-01 RX ADMIN — HEPARIN SODIUM 5000 UNITS: 5000 INJECTION, SOLUTION INTRAVENOUS; SUBCUTANEOUS at 15:48

## 2020-01-01 RX ADMIN — HYDRALAZINE HYDROCHLORIDE 10 MG: 10 TABLET ORAL at 20:41

## 2020-01-01 RX ADMIN — INSULIN LISPRO 3 UNITS: 100 INJECTION, SOLUTION INTRAVENOUS; SUBCUTANEOUS at 17:26

## 2020-01-01 RX ADMIN — HYDROCODONE BITARTRATE AND ACETAMINOPHEN 1 TABLET: 5; 325 TABLET ORAL at 05:34

## 2020-01-01 RX ADMIN — DULOXETINE HYDROCHLORIDE 60 MG: 60 CAPSULE, DELAYED RELEASE ORAL at 09:59

## 2020-01-01 RX ADMIN — PROPOFOL 40 MG: 10 INJECTION, EMULSION INTRAVENOUS at 14:40

## 2020-01-01 RX ADMIN — METRONIDAZOLE 500 MG: 500 INJECTION, SOLUTION INTRAVENOUS at 03:28

## 2020-01-01 RX ADMIN — SODIUM CHLORIDE, PRESERVATIVE FREE 10 ML: 5 INJECTION INTRAVENOUS at 20:50

## 2020-01-01 RX ADMIN — VANCOMYCIN HYDROCHLORIDE 250 MG: KIT at 07:26

## 2020-01-01 RX ADMIN — INSULIN LISPRO 2 UNITS: 100 INJECTION, SOLUTION INTRAVENOUS; SUBCUTANEOUS at 08:31

## 2020-01-01 RX ADMIN — VANCOMYCIN HYDROCHLORIDE 250 MG: KIT at 12:28

## 2020-01-01 RX ADMIN — MICAFUNGIN SODIUM 100 MG: 100 INJECTION, POWDER, LYOPHILIZED, FOR SOLUTION INTRAVENOUS at 08:41

## 2020-01-01 RX ADMIN — FUROSEMIDE 20 MG: 10 INJECTION, SOLUTION INTRAMUSCULAR; INTRAVENOUS at 00:21

## 2020-01-01 RX ADMIN — VANCOMYCIN HYDROCHLORIDE 250 MG: KIT at 17:55

## 2020-01-01 RX ADMIN — Medication 250 MG: at 08:43

## 2020-01-01 RX ADMIN — METRONIDAZOLE 500 MG: 500 INJECTION, SOLUTION INTRAVENOUS at 03:24

## 2020-01-01 RX ADMIN — ASPIRIN 81 MG: 81 TABLET, COATED ORAL at 09:18

## 2020-01-01 RX ADMIN — INSULIN LISPRO 2 UNITS: 100 INJECTION, SOLUTION INTRAVENOUS; SUBCUTANEOUS at 16:59

## 2020-01-01 RX ADMIN — OXYCODONE HYDROCHLORIDE AND ACETAMINOPHEN 1 TABLET: 10; 325 TABLET ORAL at 20:50

## 2020-01-01 RX ADMIN — MICONAZOLE NITRATE 1 APPLICATION: 2 CREAM TOPICAL at 21:18

## 2020-01-01 RX ADMIN — MEROPENEM 1 G: 1 INJECTION, POWDER, FOR SOLUTION INTRAVENOUS at 11:55

## 2020-01-01 RX ADMIN — INSULIN DETEMIR 5 UNITS: 100 INJECTION, SOLUTION SUBCUTANEOUS at 21:21

## 2020-01-01 RX ADMIN — HEPARIN SODIUM 5000 UNITS: 5000 INJECTION, SOLUTION INTRAVENOUS; SUBCUTANEOUS at 21:27

## 2020-01-01 RX ADMIN — SODIUM CHLORIDE, PRESERVATIVE FREE 10 ML: 5 INJECTION INTRAVENOUS at 21:26

## 2020-01-01 RX ADMIN — SODIUM CHLORIDE, PRESERVATIVE FREE 10 ML: 5 INJECTION INTRAVENOUS at 09:31

## 2020-01-01 RX ADMIN — ASPIRIN 81 MG: 81 TABLET, COATED ORAL at 15:35

## 2020-01-01 RX ADMIN — TERAZOSIN HYDROCHLORIDE 10 MG: 5 CAPSULE ORAL at 20:34

## 2020-01-01 RX ADMIN — AMPICILLIN SODIUM AND SULBACTAM SODIUM 3 G: 2; 1 INJECTION, POWDER, FOR SOLUTION INTRAMUSCULAR; INTRAVENOUS at 03:18

## 2020-01-01 RX ADMIN — MEROPENEM 1 G: 1 INJECTION, POWDER, FOR SOLUTION INTRAVENOUS at 01:50

## 2020-01-01 RX ADMIN — ASPIRIN 81 MG: 81 TABLET, COATED ORAL at 08:35

## 2020-01-01 RX ADMIN — HYDRALAZINE HYDROCHLORIDE 10 MG: 10 TABLET ORAL at 21:34

## 2020-01-01 RX ADMIN — MEROPENEM 1 G: 1 INJECTION, POWDER, FOR SOLUTION INTRAVENOUS at 01:36

## 2020-01-01 RX ADMIN — QUETIAPINE FUMARATE 12.5 MG: 25 TABLET ORAL at 21:34

## 2020-01-01 RX ADMIN — METRONIDAZOLE 500 MG: 500 INJECTION, SOLUTION INTRAVENOUS at 08:41

## 2020-01-01 RX ADMIN — MICAFUNGIN SODIUM 100 MG: 20 INJECTION, POWDER, LYOPHILIZED, FOR SOLUTION INTRAVENOUS at 11:40

## 2020-01-01 RX ADMIN — AMPICILLIN SODIUM AND SULBACTAM SODIUM 3 G: 2; 1 INJECTION, POWDER, FOR SOLUTION INTRAMUSCULAR; INTRAVENOUS at 03:28

## 2020-01-01 RX ADMIN — ASPIRIN 81 MG: 81 TABLET, COATED ORAL at 09:09

## 2020-01-01 RX ADMIN — QUETIAPINE FUMARATE 25 MG: 25 TABLET ORAL at 20:20

## 2020-01-01 RX ADMIN — HEPARIN SODIUM 5000 UNITS: 5000 INJECTION, SOLUTION INTRAVENOUS; SUBCUTANEOUS at 12:11

## 2020-01-01 RX ADMIN — OXYCODONE HYDROCHLORIDE AND ACETAMINOPHEN 1 TABLET: 10; 325 TABLET ORAL at 22:35

## 2020-01-01 RX ADMIN — VANCOMYCIN HYDROCHLORIDE 250 MG: KIT at 12:02

## 2020-01-01 RX ADMIN — METRONIDAZOLE 500 MG: 500 INJECTION, SOLUTION INTRAVENOUS at 12:56

## 2020-01-01 RX ADMIN — ASPIRIN 81 MG: 81 TABLET, FILM COATED ORAL at 20:51

## 2020-01-01 RX ADMIN — QUETIAPINE FUMARATE 12.5 MG: 25 TABLET ORAL at 21:17

## 2020-01-01 RX ADMIN — HYDRALAZINE HYDROCHLORIDE 10 MG: 10 TABLET ORAL at 05:24

## 2020-01-01 RX ADMIN — DOCUSATE SODIUM 100 MG: 100 CAPSULE ORAL at 20:14

## 2020-01-01 RX ADMIN — IOPAMIDOL 100 ML: 612 INJECTION, SOLUTION INTRAVENOUS at 03:33

## 2020-01-01 RX ADMIN — SODIUM CHLORIDE, PRESERVATIVE FREE 10 ML: 5 INJECTION INTRAVENOUS at 09:02

## 2020-01-01 RX ADMIN — HYDRALAZINE HYDROCHLORIDE 10 MG: 10 TABLET ORAL at 23:14

## 2020-01-01 RX ADMIN — VANCOMYCIN HYDROCHLORIDE 250 MG: KIT at 12:07

## 2020-01-01 RX ADMIN — INSULIN LISPRO 3 UNITS: 100 INJECTION, SOLUTION INTRAVENOUS; SUBCUTANEOUS at 17:34

## 2020-01-01 RX ADMIN — INSULIN DETEMIR 5 UNITS: 100 INJECTION, SOLUTION SUBCUTANEOUS at 13:10

## 2020-01-01 RX ADMIN — TAZOBACTAM SODIUM AND PIPERACILLIN SODIUM 3.38 G: 375; 3 INJECTION, SOLUTION INTRAVENOUS at 20:08

## 2020-01-01 RX ADMIN — INSULIN LISPRO 2 UNITS: 100 INJECTION, SOLUTION INTRAVENOUS; SUBCUTANEOUS at 09:56

## 2020-01-01 RX ADMIN — INSULIN LISPRO 2 UNITS: 100 INJECTION, SOLUTION INTRAVENOUS; SUBCUTANEOUS at 08:55

## 2020-01-01 RX ADMIN — INSULIN DETEMIR 10 UNITS: 100 INJECTION, SOLUTION SUBCUTANEOUS at 21:21

## 2020-01-01 RX ADMIN — INSULIN LISPRO 2 UNITS: 100 INJECTION, SOLUTION INTRAVENOUS; SUBCUTANEOUS at 08:21

## 2020-01-01 RX ADMIN — CEFTRIAXONE SODIUM 2 G: 2 INJECTION, POWDER, FOR SOLUTION INTRAMUSCULAR; INTRAVENOUS at 10:51

## 2020-01-01 RX ADMIN — DULOXETINE HYDROCHLORIDE 60 MG: 60 CAPSULE, DELAYED RELEASE ORAL at 09:27

## 2020-01-01 RX ADMIN — HYDRALAZINE HYDROCHLORIDE 10 MG: 20 INJECTION INTRAMUSCULAR; INTRAVENOUS at 10:02

## 2020-01-01 RX ADMIN — QUETIAPINE FUMARATE 12.5 MG: 25 TABLET ORAL at 22:40

## 2020-01-01 RX ADMIN — HEPARIN SODIUM 5000 UNITS: 5000 INJECTION, SOLUTION INTRAVENOUS; SUBCUTANEOUS at 13:52

## 2020-01-01 RX ADMIN — MICAFUNGIN SODIUM 100 MG: 20 INJECTION, POWDER, LYOPHILIZED, FOR SOLUTION INTRAVENOUS at 16:41

## 2020-01-01 RX ADMIN — METOPROLOL TARTRATE 100 MG: 100 TABLET, FILM COATED ORAL at 20:14

## 2020-01-01 RX ADMIN — MORPHINE SULFATE 2 MG: 2 INJECTION, SOLUTION INTRAMUSCULAR; INTRAVENOUS at 23:10

## 2020-01-01 RX ADMIN — TERAZOSIN HYDROCHLORIDE 10 MG: 5 CAPSULE ORAL at 21:09

## 2020-01-01 RX ADMIN — KETOROLAC TROMETHAMINE 15 MG: 15 INJECTION, SOLUTION INTRAMUSCULAR; INTRAVENOUS at 21:31

## 2020-01-01 RX ADMIN — IPRATROPIUM BROMIDE AND ALBUTEROL SULFATE 3 ML: 2.5; .5 SOLUTION RESPIRATORY (INHALATION) at 20:50

## 2020-01-01 RX ADMIN — METOPROLOL TARTRATE 100 MG: 100 TABLET ORAL at 09:53

## 2020-01-01 RX ADMIN — AMLODIPINE BESYLATE 10 MG: 10 TABLET ORAL at 10:29

## 2020-01-01 RX ADMIN — INSULIN DETEMIR 5 UNITS: 100 INJECTION, SOLUTION SUBCUTANEOUS at 13:53

## 2020-01-01 RX ADMIN — METRONIDAZOLE 500 MG: 500 INJECTION, SOLUTION INTRAVENOUS at 12:27

## 2020-01-01 RX ADMIN — INSULIN LISPRO 3 UNITS: 100 INJECTION, SOLUTION INTRAVENOUS; SUBCUTANEOUS at 08:30

## 2020-01-01 RX ADMIN — ASPIRIN 81 MG: 81 TABLET, FILM COATED ORAL at 09:12

## 2020-01-01 RX ADMIN — MICONAZOLE NITRATE: 20 POWDER TOPICAL at 20:50

## 2020-01-01 RX ADMIN — OXYCODONE HYDROCHLORIDE AND ACETAMINOPHEN 1 TABLET: 10; 325 TABLET ORAL at 20:19

## 2020-01-01 RX ADMIN — HEPARIN SODIUM 5000 UNITS: 5000 INJECTION, SOLUTION INTRAVENOUS; SUBCUTANEOUS at 12:31

## 2020-01-01 RX ADMIN — METRONIDAZOLE 500 MG: 500 INJECTION, SOLUTION INTRAVENOUS at 23:45

## 2020-01-01 RX ADMIN — MICONAZOLE NITRATE 1 APPLICATION: 2 CREAM TOPICAL at 09:31

## 2020-01-01 RX ADMIN — SODIUM CHLORIDE 75 ML/HR: 9 INJECTION, SOLUTION INTRAVENOUS at 13:00

## 2020-01-01 RX ADMIN — LORAZEPAM 2 MG: 1 TABLET ORAL at 20:14

## 2020-01-01 RX ADMIN — ASPIRIN 81 MG: 81 TABLET, COATED ORAL at 09:26

## 2020-01-01 RX ADMIN — FUROSEMIDE 20 MG: 10 INJECTION, SOLUTION INTRAMUSCULAR; INTRAVENOUS at 10:54

## 2020-01-01 RX ADMIN — MICONAZOLE NITRATE 1 APPLICATION: 2 CREAM TOPICAL at 22:17

## 2020-01-01 RX ADMIN — HYDRALAZINE HYDROCHLORIDE 10 MG: 10 TABLET ORAL at 15:01

## 2020-01-01 RX ADMIN — HYDRALAZINE HYDROCHLORIDE 10 MG: 20 INJECTION INTRAMUSCULAR; INTRAVENOUS at 11:58

## 2020-01-01 RX ADMIN — ASPIRIN 81 MG: 81 TABLET, COATED ORAL at 12:30

## 2020-01-01 RX ADMIN — VANCOMYCIN HYDROCHLORIDE 2500 MG: 10 INJECTION, POWDER, LYOPHILIZED, FOR SOLUTION INTRAVENOUS at 13:13

## 2020-01-01 RX ADMIN — OXYCODONE HYDROCHLORIDE AND ACETAMINOPHEN 1 TABLET: 10; 325 TABLET ORAL at 23:53

## 2020-01-01 RX ADMIN — INSULIN LISPRO 3 UNITS: 100 INJECTION, SOLUTION INTRAVENOUS; SUBCUTANEOUS at 17:37

## 2020-01-01 RX ADMIN — HYDRALAZINE HYDROCHLORIDE 10 MG: 10 TABLET ORAL at 05:38

## 2020-01-01 RX ADMIN — HEPARIN SODIUM 5000 UNITS: 5000 INJECTION, SOLUTION INTRAVENOUS; SUBCUTANEOUS at 01:02

## 2020-01-01 RX ADMIN — ONDANSETRON 4 MG: 2 INJECTION INTRAMUSCULAR; INTRAVENOUS at 18:19

## 2020-01-01 RX ADMIN — INSULIN LISPRO 2 UNITS: 100 INJECTION, SOLUTION INTRAVENOUS; SUBCUTANEOUS at 13:05

## 2020-01-01 RX ADMIN — VANCOMYCIN HYDROCHLORIDE 250 MG: KIT at 11:35

## 2020-01-01 RX ADMIN — MICAFUNGIN SODIUM 100 MG: 20 INJECTION, POWDER, LYOPHILIZED, FOR SOLUTION INTRAVENOUS at 17:57

## 2020-01-01 RX ADMIN — MEROPENEM 1 G: 1 INJECTION, POWDER, FOR SOLUTION INTRAVENOUS at 08:58

## 2020-01-01 RX ADMIN — VANCOMYCIN HYDROCHLORIDE 250 MG: KIT at 13:42

## 2020-01-01 RX ADMIN — METRONIDAZOLE 500 MG: 500 INJECTION, SOLUTION INTRAVENOUS at 20:32

## 2020-01-01 RX ADMIN — ASPIRIN 81 MG: 81 TABLET, COATED ORAL at 08:36

## 2020-01-01 RX ADMIN — METOPROLOL TARTRATE 100 MG: 100 TABLET ORAL at 20:43

## 2020-01-01 RX ADMIN — VANCOMYCIN HYDROCHLORIDE 250 MG: KIT at 19:19

## 2020-01-01 RX ADMIN — VANCOMYCIN HYDROCHLORIDE 250 MG: KIT at 17:07

## 2020-01-01 RX ADMIN — MEROPENEM 1 G: 1 INJECTION, POWDER, FOR SOLUTION INTRAVENOUS at 17:57

## 2020-01-01 RX ADMIN — INSULIN LISPRO 2 UNITS: 100 INJECTION, SOLUTION INTRAVENOUS; SUBCUTANEOUS at 09:59

## 2020-01-01 RX ADMIN — CEFTRIAXONE 2 G: 2 INJECTION, POWDER, FOR SOLUTION INTRAMUSCULAR; INTRAVENOUS at 10:37

## 2020-01-01 RX ADMIN — HEPARIN SODIUM 5000 UNITS: 5000 INJECTION, SOLUTION INTRAVENOUS; SUBCUTANEOUS at 05:23

## 2020-01-01 RX ADMIN — HYDROCORTISONE: 25 CREAM TOPICAL at 21:49

## 2020-01-01 RX ADMIN — MEROPENEM 1 G: 1 INJECTION, POWDER, FOR SOLUTION INTRAVENOUS at 02:24

## 2020-01-01 RX ADMIN — HEPARIN SODIUM 5000 UNITS: 5000 INJECTION, SOLUTION INTRAVENOUS; SUBCUTANEOUS at 13:05

## 2020-01-01 RX ADMIN — MICONAZOLE NITRATE 1 APPLICATION: 2 CREAM TOPICAL at 09:24

## 2020-01-01 RX ADMIN — Medication 250 MG: at 08:25

## 2020-01-01 RX ADMIN — SODIUM CHLORIDE, PRESERVATIVE FREE 10 ML: 5 INJECTION INTRAVENOUS at 09:18

## 2020-01-01 RX ADMIN — VANCOMYCIN HYDROCHLORIDE 250 MG: KIT at 16:38

## 2020-01-01 RX ADMIN — FUROSEMIDE 80 MG: 10 INJECTION, SOLUTION INTRAMUSCULAR; INTRAVENOUS at 09:59

## 2020-01-01 RX ADMIN — Medication 250 MG: at 08:58

## 2020-01-01 RX ADMIN — AMPICILLIN SODIUM AND SULBACTAM SODIUM 3 G: 2; 1 INJECTION, POWDER, FOR SOLUTION INTRAMUSCULAR; INTRAVENOUS at 08:49

## 2020-01-01 RX ADMIN — OXYCODONE HYDROCHLORIDE AND ACETAMINOPHEN 1 TABLET: 10; 325 TABLET ORAL at 08:58

## 2020-01-01 RX ADMIN — METRONIDAZOLE 500 MG: 500 INJECTION, SOLUTION INTRAVENOUS at 20:35

## 2020-01-01 RX ADMIN — METRONIDAZOLE 500 MG: 500 INJECTION, SOLUTION INTRAVENOUS at 17:11

## 2020-01-01 RX ADMIN — POTASSIUM CHLORIDE 40 MEQ: 1.5 POWDER, FOR SOLUTION ORAL at 01:15

## 2020-01-01 RX ADMIN — TAZOBACTAM SODIUM AND PIPERACILLIN SODIUM 3.38 G: 375; 3 INJECTION, SOLUTION INTRAVENOUS at 20:56

## 2020-01-01 RX ADMIN — METOPROLOL TARTRATE 100 MG: 100 TABLET ORAL at 08:42

## 2020-01-01 RX ADMIN — NYSTATIN 1 APPLICATION: 100000 CREAM TOPICAL at 09:14

## 2020-01-01 RX ADMIN — TAZOBACTAM SODIUM AND PIPERACILLIN SODIUM 3.38 G: 375; 3 INJECTION, SOLUTION INTRAVENOUS at 02:05

## 2020-01-01 RX ADMIN — MICAFUNGIN SODIUM 100 MG: 100 INJECTION, POWDER, LYOPHILIZED, FOR SOLUTION INTRAVENOUS at 17:34

## 2020-01-01 RX ADMIN — DULOXETINE HYDROCHLORIDE 60 MG: 60 CAPSULE, DELAYED RELEASE ORAL at 08:33

## 2020-01-01 RX ADMIN — INSULIN DETEMIR 5 UNITS: 100 INJECTION, SOLUTION SUBCUTANEOUS at 21:24

## 2020-01-01 RX ADMIN — VANCOMYCIN HYDROCHLORIDE 250 MG: KIT at 00:00

## 2020-01-01 RX ADMIN — METOPROLOL TARTRATE 100 MG: 100 TABLET ORAL at 08:41

## 2020-01-01 RX ADMIN — VANCOMYCIN HYDROCHLORIDE 250 MG: KIT at 17:27

## 2020-01-01 RX ADMIN — METOPROLOL TARTRATE 100 MG: 100 TABLET ORAL at 23:24

## 2020-01-01 RX ADMIN — INSULIN LISPRO 2 UNITS: 100 INJECTION, SOLUTION INTRAVENOUS; SUBCUTANEOUS at 12:53

## 2020-01-01 RX ADMIN — MEROPENEM 1 G: 1 INJECTION, POWDER, FOR SOLUTION INTRAVENOUS at 01:12

## 2020-01-01 RX ADMIN — INSULIN LISPRO 2 UNITS: 100 INJECTION, SOLUTION INTRAVENOUS; SUBCUTANEOUS at 11:30

## 2020-01-01 RX ADMIN — HEPARIN SODIUM 5000 UNITS: 5000 INJECTION INTRAVENOUS; SUBCUTANEOUS at 05:49

## 2020-01-01 RX ADMIN — HEPARIN SODIUM 5000 UNITS: 5000 INJECTION, SOLUTION INTRAVENOUS; SUBCUTANEOUS at 05:41

## 2020-01-01 RX ADMIN — HEPARIN SODIUM 5000 UNITS: 5000 INJECTION, SOLUTION INTRAVENOUS; SUBCUTANEOUS at 05:11

## 2020-01-01 RX ADMIN — Medication 250 MG: at 09:27

## 2020-01-01 RX ADMIN — HEPARIN SODIUM 5000 UNITS: 5000 INJECTION, SOLUTION INTRAVENOUS; SUBCUTANEOUS at 22:59

## 2020-01-01 RX ADMIN — HEPARIN SODIUM 5000 UNITS: 5000 INJECTION, SOLUTION INTRAVENOUS; SUBCUTANEOUS at 21:24

## 2020-01-01 RX ADMIN — OXYCODONE HYDROCHLORIDE AND ACETAMINOPHEN 2 TABLET: 5; 325 TABLET ORAL at 18:17

## 2020-01-01 RX ADMIN — METRONIDAZOLE 500 MG: 500 INJECTION, SOLUTION INTRAVENOUS at 21:03

## 2020-01-01 RX ADMIN — HEPARIN SODIUM 5000 UNITS: 5000 INJECTION, SOLUTION INTRAVENOUS; SUBCUTANEOUS at 06:15

## 2020-01-01 RX ADMIN — METRONIDAZOLE 500 MG: 500 INJECTION, SOLUTION INTRAVENOUS at 03:34

## 2020-01-01 RX ADMIN — ASPIRIN 81 MG: 81 TABLET, COATED ORAL at 08:07

## 2020-01-01 RX ADMIN — METOPROLOL TARTRATE 100 MG: 100 TABLET ORAL at 21:42

## 2020-01-01 RX ADMIN — INSULIN LISPRO 3 UNITS: 100 INJECTION, SOLUTION INTRAVENOUS; SUBCUTANEOUS at 18:03

## 2020-01-01 RX ADMIN — SODIUM CHLORIDE, PRESERVATIVE FREE 10 ML: 5 INJECTION INTRAVENOUS at 08:31

## 2020-01-01 RX ADMIN — VANCOMYCIN HYDROCHLORIDE 125 MG: KIT at 12:26

## 2020-01-01 RX ADMIN — INSULIN DETEMIR 10 UNITS: 100 INJECTION, SOLUTION SUBCUTANEOUS at 23:23

## 2020-01-01 RX ADMIN — MORPHINE SULFATE 4 MG: 4 INJECTION, SOLUTION INTRAMUSCULAR; INTRAVENOUS at 21:31

## 2020-01-01 RX ADMIN — INSULIN LISPRO 2 UNITS: 100 INJECTION, SOLUTION INTRAVENOUS; SUBCUTANEOUS at 11:45

## 2020-01-01 RX ADMIN — TERAZOSIN HYDROCHLORIDE 5 MG: 5 CAPSULE ORAL at 20:43

## 2020-01-01 RX ADMIN — Medication 250 MG: at 09:53

## 2020-01-01 RX ADMIN — AMPICILLIN SODIUM AND SULBACTAM SODIUM 3 G: 2; 1 INJECTION, POWDER, FOR SOLUTION INTRAMUSCULAR; INTRAVENOUS at 13:52

## 2020-01-01 RX ADMIN — METRONIDAZOLE 500 MG: 500 INJECTION, SOLUTION INTRAVENOUS at 21:30

## 2020-01-01 RX ADMIN — MEROPENEM 1 G: 1 INJECTION, POWDER, FOR SOLUTION INTRAVENOUS at 02:02

## 2020-01-01 RX ADMIN — METOPROLOL TARTRATE 100 MG: 100 TABLET ORAL at 12:25

## 2020-01-01 RX ADMIN — LORAZEPAM 2 MG: 1 TABLET ORAL at 20:55

## 2020-01-01 RX ADMIN — MICAFUNGIN SODIUM 100 MG: 100 INJECTION, POWDER, LYOPHILIZED, FOR SOLUTION INTRAVENOUS at 09:09

## 2020-01-01 RX ADMIN — DULOXETINE HYDROCHLORIDE 60 MG: 60 CAPSULE, DELAYED RELEASE ORAL at 08:54

## 2020-01-01 RX ADMIN — MELATONIN TAB 5 MG 5 MG: 5 TAB at 21:57

## 2020-01-01 RX ADMIN — ONDANSETRON 4 MG: 2 INJECTION INTRAMUSCULAR; INTRAVENOUS at 00:32

## 2020-01-01 RX ADMIN — OXYCODONE HYDROCHLORIDE AND ACETAMINOPHEN 2 TABLET: 5; 325 TABLET ORAL at 04:09

## 2020-01-01 RX ADMIN — OXYCODONE HYDROCHLORIDE AND ACETAMINOPHEN 1 TABLET: 10; 325 TABLET ORAL at 08:52

## 2020-01-01 RX ADMIN — HEPARIN SODIUM 5000 UNITS: 5000 INJECTION, SOLUTION INTRAVENOUS; SUBCUTANEOUS at 06:14

## 2020-01-01 RX ADMIN — VANCOMYCIN HYDROCHLORIDE 125 MG: KIT at 01:18

## 2020-01-01 RX ADMIN — MAGNESIUM SULFATE 2 G: 2 INJECTION INTRAVENOUS at 23:41

## 2020-01-01 RX ADMIN — MEROPENEM 1 G: 1 INJECTION, POWDER, FOR SOLUTION INTRAVENOUS at 00:10

## 2020-01-01 RX ADMIN — MEROPENEM 1 G: 1 INJECTION, POWDER, FOR SOLUTION INTRAVENOUS at 09:59

## 2020-01-01 RX ADMIN — GADOBENATE DIMEGLUMINE 20 ML: 529 INJECTION, SOLUTION INTRAVENOUS at 02:30

## 2020-01-01 RX ADMIN — DOCUSATE SODIUM 100 MG: 100 CAPSULE ORAL at 20:02

## 2020-01-01 RX ADMIN — INSULIN LISPRO 2 UNITS: 100 INJECTION, SOLUTION INTRAVENOUS; SUBCUTANEOUS at 17:20

## 2020-01-01 RX ADMIN — INSULIN LISPRO 5 UNITS: 100 INJECTION, SOLUTION INTRAVENOUS; SUBCUTANEOUS at 18:13

## 2020-01-01 RX ADMIN — OXYCODONE HYDROCHLORIDE AND ACETAMINOPHEN 1 TABLET: 10; 325 TABLET ORAL at 08:51

## 2020-01-01 RX ADMIN — SODIUM CHLORIDE 100 ML/HR: 9 INJECTION, SOLUTION INTRAVENOUS at 03:30

## 2020-01-01 RX ADMIN — OXYCODONE HYDROCHLORIDE AND ACETAMINOPHEN 1 TABLET: 10; 325 TABLET ORAL at 16:13

## 2020-01-01 RX ADMIN — METOPROLOL TARTRATE 100 MG: 100 TABLET ORAL at 20:51

## 2020-01-01 RX ADMIN — OXYCODONE HYDROCHLORIDE AND ACETAMINOPHEN 1 TABLET: 10; 325 TABLET ORAL at 09:52

## 2020-01-01 RX ADMIN — ENOXAPARIN SODIUM 40 MG: 40 INJECTION SUBCUTANEOUS at 08:55

## 2020-01-01 RX ADMIN — HEPARIN SODIUM 5000 UNITS: 5000 INJECTION, SOLUTION INTRAVENOUS; SUBCUTANEOUS at 21:57

## 2020-01-01 RX ADMIN — OXYCODONE 5 MG: 5 TABLET ORAL at 09:31

## 2020-01-01 RX ADMIN — SODIUM CHLORIDE, PRESERVATIVE FREE 10 ML: 5 INJECTION INTRAVENOUS at 20:41

## 2020-01-01 RX ADMIN — QUETIAPINE FUMARATE 12.5 MG: 25 TABLET ORAL at 21:04

## 2020-01-01 RX ADMIN — MICAFUNGIN SODIUM 100 MG: 100 INJECTION, POWDER, LYOPHILIZED, FOR SOLUTION INTRAVENOUS at 22:05

## 2020-01-01 RX ADMIN — INSULIN LISPRO 2 UNITS: 100 INJECTION, SOLUTION INTRAVENOUS; SUBCUTANEOUS at 09:29

## 2020-01-01 RX ADMIN — METRONIDAZOLE 500 MG: 500 INJECTION, SOLUTION INTRAVENOUS at 21:12

## 2020-01-01 RX ADMIN — MICAFUNGIN SODIUM 100 MG: 100 INJECTION, POWDER, LYOPHILIZED, FOR SOLUTION INTRAVENOUS at 21:12

## 2020-01-01 RX ADMIN — AMLODIPINE BESYLATE 10 MG: 10 TABLET ORAL at 08:43

## 2020-01-01 RX ADMIN — VANCOMYCIN HYDROCHLORIDE 250 MG: KIT at 12:22

## 2020-01-01 RX ADMIN — GABAPENTIN 300 MG: 300 CAPSULE ORAL at 20:35

## 2020-01-01 RX ADMIN — INSULIN LISPRO 2 UNITS: 100 INJECTION, SOLUTION INTRAVENOUS; SUBCUTANEOUS at 08:41

## 2020-01-01 RX ADMIN — HEPARIN SODIUM 5000 UNITS: 5000 INJECTION, SOLUTION INTRAVENOUS; SUBCUTANEOUS at 21:16

## 2020-01-01 RX ADMIN — HYDRALAZINE HYDROCHLORIDE 10 MG: 10 TABLET ORAL at 21:04

## 2020-01-01 RX ADMIN — METRONIDAZOLE 500 MG: 500 INJECTION, SOLUTION INTRAVENOUS at 02:34

## 2020-01-01 RX ADMIN — INSULIN LISPRO 2 UNITS: 100 INJECTION, SOLUTION INTRAVENOUS; SUBCUTANEOUS at 08:48

## 2020-01-01 RX ADMIN — METOPROLOL TARTRATE 100 MG: 100 TABLET ORAL at 08:30

## 2020-01-01 RX ADMIN — SODIUM CHLORIDE, PRESERVATIVE FREE 10 ML: 5 INJECTION INTRAVENOUS at 23:26

## 2020-01-01 RX ADMIN — HEPARIN SODIUM 5000 UNITS: 5000 INJECTION, SOLUTION INTRAVENOUS; SUBCUTANEOUS at 07:00

## 2020-01-01 RX ADMIN — FUROSEMIDE 40 MG: 40 TABLET ORAL at 09:13

## 2020-01-01 RX ADMIN — INSULIN LISPRO 2 UNITS: 100 INJECTION, SOLUTION INTRAVENOUS; SUBCUTANEOUS at 17:11

## 2020-01-01 RX ADMIN — MICAFUNGIN SODIUM 100 MG: 100 INJECTION, POWDER, LYOPHILIZED, FOR SOLUTION INTRAVENOUS at 21:48

## 2020-01-01 RX ADMIN — METRONIDAZOLE 500 MG: 500 INJECTION, SOLUTION INTRAVENOUS at 06:45

## 2020-01-01 RX ADMIN — METRONIDAZOLE 500 MG: 500 INJECTION, SOLUTION INTRAVENOUS at 11:39

## 2020-01-01 RX ADMIN — MEROPENEM 1 G: 1 INJECTION, POWDER, FOR SOLUTION INTRAVENOUS at 16:12

## 2020-01-01 RX ADMIN — ONDANSETRON 4 MG: 2 INJECTION INTRAMUSCULAR; INTRAVENOUS at 22:19

## 2020-01-01 RX ADMIN — INSULIN DETEMIR 5 UNITS: 100 INJECTION, SOLUTION SUBCUTANEOUS at 21:56

## 2020-01-01 RX ADMIN — SODIUM CHLORIDE, PRESERVATIVE FREE 10 ML: 5 INJECTION INTRAVENOUS at 21:17

## 2020-01-01 RX ADMIN — FUROSEMIDE 60 MG: 10 INJECTION, SOLUTION INTRAMUSCULAR; INTRAVENOUS at 21:10

## 2020-01-01 RX ADMIN — MELATONIN TAB 5 MG 5 MG: 5 TAB at 22:42

## 2020-01-01 RX ADMIN — INSULIN LISPRO 2 UNITS: 100 INJECTION, SOLUTION INTRAVENOUS; SUBCUTANEOUS at 13:06

## 2020-01-01 RX ADMIN — ASPIRIN 81 MG: 81 TABLET, FILM COATED ORAL at 08:35

## 2020-01-01 RX ADMIN — GABAPENTIN 100 MG: 100 CAPSULE ORAL at 10:40

## 2020-01-01 RX ADMIN — HYDRALAZINE HYDROCHLORIDE 10 MG: 10 TABLET ORAL at 20:43

## 2020-01-01 RX ADMIN — SERTRALINE HYDROCHLORIDE 50 MG: 50 TABLET ORAL at 08:44

## 2020-01-01 RX ADMIN — GABAPENTIN 300 MG: 300 CAPSULE ORAL at 20:51

## 2020-01-01 RX ADMIN — HEPARIN SODIUM 5000 UNITS: 5000 INJECTION, SOLUTION INTRAVENOUS; SUBCUTANEOUS at 20:57

## 2020-01-01 RX ADMIN — HEPARIN SODIUM 5000 UNITS: 5000 INJECTION, SOLUTION INTRAVENOUS; SUBCUTANEOUS at 05:34

## 2020-01-01 RX ADMIN — VANCOMYCIN HYDROCHLORIDE 125 MG: KIT at 09:46

## 2020-01-01 RX ADMIN — LORAZEPAM 1 MG: 2 INJECTION INTRAMUSCULAR; INTRAVENOUS at 22:20

## 2020-01-01 RX ADMIN — LORAZEPAM 1 MG: 2 INJECTION INTRAMUSCULAR; INTRAVENOUS at 02:07

## 2020-01-01 RX ADMIN — METOPROLOL TARTRATE 100 MG: 100 TABLET ORAL at 08:01

## 2020-01-01 RX ADMIN — VANCOMYCIN HYDROCHLORIDE 250 MG: KIT at 17:53

## 2020-01-01 RX ADMIN — TAZOBACTAM SODIUM AND PIPERACILLIN SODIUM 3.38 G: 375; 3 INJECTION, SOLUTION INTRAVENOUS at 04:52

## 2020-01-01 RX ADMIN — SODIUM CHLORIDE 75 ML/HR: 9 INJECTION, SOLUTION INTRAVENOUS at 05:53

## 2020-01-01 RX ADMIN — OXYCODONE 5 MG: 5 TABLET ORAL at 05:24

## 2020-01-01 RX ADMIN — VANCOMYCIN HYDROCHLORIDE 250 MG: KIT at 11:38

## 2020-01-01 RX ADMIN — AMLODIPINE BESYLATE 10 MG: 10 TABLET ORAL at 08:23

## 2020-01-01 RX ADMIN — VANCOMYCIN HYDROCHLORIDE 125 MG: KIT at 06:36

## 2020-01-01 RX ADMIN — VANCOMYCIN HYDROCHLORIDE 125 MG: KIT at 00:45

## 2020-01-01 RX ADMIN — INSULIN DETEMIR 12 UNITS: 100 INJECTION, SOLUTION SUBCUTANEOUS at 21:42

## 2020-01-01 RX ADMIN — DULOXETINE HYDROCHLORIDE 60 MG: 60 CAPSULE, DELAYED RELEASE ORAL at 08:52

## 2020-01-01 RX ADMIN — METOPROLOL TARTRATE 100 MG: 100 TABLET, FILM COATED ORAL at 09:12

## 2020-01-01 RX ADMIN — INSULIN LISPRO 3 UNITS: 100 INJECTION, SOLUTION INTRAVENOUS; SUBCUTANEOUS at 11:26

## 2020-01-01 RX ADMIN — DAPTOMYCIN 550 MG: 500 INJECTION, POWDER, LYOPHILIZED, FOR SOLUTION INTRAVENOUS at 09:15

## 2020-01-01 RX ADMIN — METOPROLOL TARTRATE 100 MG: 100 TABLET, FILM COATED ORAL at 20:55

## 2020-01-01 RX ADMIN — LIDOCAINE 1 PATCH: 50 PATCH CUTANEOUS at 08:35

## 2020-01-01 RX ADMIN — MORPHINE SULFATE 4 MG: 4 INJECTION, SOLUTION INTRAMUSCULAR; INTRAVENOUS at 20:23

## 2020-01-01 RX ADMIN — MEROPENEM 1 G: 1 INJECTION, POWDER, FOR SOLUTION INTRAVENOUS at 01:00

## 2020-01-01 RX ADMIN — DULOXETINE HYDROCHLORIDE 30 MG: 30 CAPSULE, DELAYED RELEASE ORAL at 09:01

## 2020-01-01 RX ADMIN — OXYCODONE HYDROCHLORIDE AND ACETAMINOPHEN 1 TABLET: 10; 325 TABLET ORAL at 20:59

## 2020-01-01 RX ADMIN — ONDANSETRON 4 MG: 2 INJECTION INTRAMUSCULAR; INTRAVENOUS at 23:24

## 2020-01-01 RX ADMIN — MEROPENEM 1 G: 1 INJECTION, POWDER, FOR SOLUTION INTRAVENOUS at 11:15

## 2020-01-01 RX ADMIN — AMLODIPINE BESYLATE 5 MG: 5 TABLET ORAL at 08:13

## 2020-01-01 RX ADMIN — OXYCODONE HYDROCHLORIDE AND ACETAMINOPHEN 1 TABLET: 10; 325 TABLET ORAL at 15:25

## 2020-01-01 RX ADMIN — INSULIN HUMAN 20 UNITS: 100 INJECTION, SOLUTION PARENTERAL at 11:57

## 2020-01-01 RX ADMIN — Medication 250 MG: at 08:02

## 2020-01-01 RX ADMIN — FUROSEMIDE 40 MG: 40 TABLET ORAL at 08:58

## 2020-01-01 RX ADMIN — METRONIDAZOLE 500 MG: 500 INJECTION, SOLUTION INTRAVENOUS at 15:20

## 2020-01-01 RX ADMIN — SODIUM CHLORIDE 50 ML/HR: 9 INJECTION, SOLUTION INTRAVENOUS at 15:53

## 2020-01-01 RX ADMIN — Medication: at 09:54

## 2020-01-01 RX ADMIN — AMPICILLIN SODIUM AND SULBACTAM SODIUM 3 G: 2; 1 INJECTION, POWDER, FOR SOLUTION INTRAMUSCULAR; INTRAVENOUS at 02:03

## 2020-01-01 RX ADMIN — NYSTATIN: 100000 POWDER TOPICAL at 09:40

## 2020-01-01 RX ADMIN — LORAZEPAM 1 MG: 2 INJECTION INTRAMUSCULAR; INTRAVENOUS at 10:59

## 2020-01-01 RX ADMIN — INSULIN LISPRO 3 UNITS: 100 INJECTION, SOLUTION INTRAVENOUS; SUBCUTANEOUS at 09:30

## 2020-01-01 RX ADMIN — Medication 5 MG: at 20:02

## 2020-01-01 RX ADMIN — INSULIN LISPRO 4 UNITS: 100 INJECTION, SOLUTION INTRAVENOUS; SUBCUTANEOUS at 17:46

## 2020-01-01 RX ADMIN — Medication 250 MG: at 20:02

## 2020-01-01 RX ADMIN — VANCOMYCIN HYDROCHLORIDE 250 MG: KIT at 00:46

## 2020-01-01 RX ADMIN — VANCOMYCIN HYDROCHLORIDE 250 MG: KIT at 00:15

## 2020-01-01 RX ADMIN — Medication 250 MG: at 08:19

## 2020-01-01 RX ADMIN — VANCOMYCIN HYDROCHLORIDE 250 MG: KIT at 06:31

## 2020-01-01 RX ADMIN — HEPARIN SODIUM 5000 UNITS: 5000 INJECTION INTRAVENOUS; SUBCUTANEOUS at 13:26

## 2020-01-01 RX ADMIN — OXYCODONE HYDROCHLORIDE AND ACETAMINOPHEN 1 TABLET: 10; 325 TABLET ORAL at 21:43

## 2020-01-01 RX ADMIN — METOPROLOL TARTRATE 100 MG: 100 TABLET ORAL at 20:35

## 2020-01-01 RX ADMIN — HEPARIN SODIUM 5000 UNITS: 5000 INJECTION, SOLUTION INTRAVENOUS; SUBCUTANEOUS at 13:09

## 2020-01-01 RX ADMIN — TERAZOSIN HYDROCHLORIDE 5 MG: 5 CAPSULE ORAL at 20:42

## 2020-01-01 RX ADMIN — Medication 250 MG: at 20:14

## 2020-01-01 RX ADMIN — MICONAZOLE NITRATE: 20 POWDER TOPICAL at 22:53

## 2020-01-01 RX ADMIN — METRONIDAZOLE 500 MG: 500 INJECTION, SOLUTION INTRAVENOUS at 21:33

## 2020-01-01 RX ADMIN — OXYCODONE HYDROCHLORIDE AND ACETAMINOPHEN 1 TABLET: 10; 325 TABLET ORAL at 09:57

## 2020-01-01 RX ADMIN — METOPROLOL TARTRATE 100 MG: 100 TABLET ORAL at 08:36

## 2020-01-01 RX ADMIN — METOPROLOL TARTRATE 100 MG: 100 TABLET ORAL at 21:35

## 2020-01-01 RX ADMIN — OXYCODONE HYDROCHLORIDE 15 MG: 15 TABLET ORAL at 20:55

## 2020-01-01 RX ADMIN — MORPHINE SULFATE 2 MG: 2 INJECTION, SOLUTION INTRAMUSCULAR; INTRAVENOUS at 10:54

## 2020-01-01 RX ADMIN — SODIUM CHLORIDE, PRESERVATIVE FREE 10 ML: 5 INJECTION INTRAVENOUS at 21:04

## 2020-01-01 RX ADMIN — OXYCODONE 5 MG: 5 TABLET ORAL at 05:32

## 2020-01-01 RX ADMIN — CEFTRIAXONE 2 G: 2 INJECTION, POWDER, FOR SOLUTION INTRAMUSCULAR; INTRAVENOUS at 12:15

## 2020-01-01 RX ADMIN — TAZOBACTAM SODIUM AND PIPERACILLIN SODIUM 3.38 G: 375; 3 INJECTION, SOLUTION INTRAVENOUS at 05:23

## 2020-01-01 RX ADMIN — OXYCODONE 5 MG: 5 TABLET ORAL at 12:37

## 2020-01-01 RX ADMIN — INSULIN DETEMIR 12 UNITS: 100 INJECTION, SOLUTION SUBCUTANEOUS at 21:25

## 2020-01-01 RX ADMIN — INSULIN LISPRO 3 UNITS: 100 INJECTION, SOLUTION INTRAVENOUS; SUBCUTANEOUS at 09:24

## 2020-01-01 RX ADMIN — DULOXETINE HYDROCHLORIDE 60 MG: 60 CAPSULE, DELAYED RELEASE ORAL at 09:33

## 2020-01-01 RX ADMIN — DULOXETINE HYDROCHLORIDE 60 MG: 60 CAPSULE, DELAYED RELEASE ORAL at 09:49

## 2020-01-01 RX ADMIN — METOPROLOL TARTRATE 100 MG: 100 TABLET ORAL at 21:11

## 2020-01-01 RX ADMIN — VANCOMYCIN HYDROCHLORIDE 250 MG: KIT at 07:00

## 2020-01-01 RX ADMIN — ASPIRIN 81 MG: 81 TABLET, FILM COATED ORAL at 09:02

## 2020-01-01 RX ADMIN — QUETIAPINE FUMARATE 12.5 MG: 25 TABLET ORAL at 20:20

## 2020-01-01 RX ADMIN — SPIRONOLACTONE 25 MG: 25 TABLET ORAL at 08:25

## 2020-01-01 RX ADMIN — VANCOMYCIN HYDROCHLORIDE 250 MG: KIT at 11:30

## 2020-01-01 RX ADMIN — QUETIAPINE FUMARATE 12.5 MG: 25 TABLET ORAL at 21:23

## 2020-01-01 RX ADMIN — TAZOBACTAM SODIUM AND PIPERACILLIN SODIUM 3.38 G: 375; 3 INJECTION, SOLUTION INTRAVENOUS at 12:05

## 2020-01-01 RX ADMIN — INSULIN LISPRO 2 UNITS: 100 INJECTION, SOLUTION INTRAVENOUS; SUBCUTANEOUS at 12:52

## 2020-01-01 RX ADMIN — FUROSEMIDE 40 MG: 10 INJECTION, SOLUTION INTRAMUSCULAR; INTRAVENOUS at 00:09

## 2020-01-01 RX ADMIN — MEROPENEM 1 G: 1 INJECTION, POWDER, FOR SOLUTION INTRAVENOUS at 00:12

## 2020-01-01 RX ADMIN — HEPARIN SODIUM 5000 UNITS: 5000 INJECTION INTRAVENOUS; SUBCUTANEOUS at 05:55

## 2020-01-01 RX ADMIN — MEROPENEM 1 G: 1 INJECTION, POWDER, FOR SOLUTION INTRAVENOUS at 08:55

## 2020-01-01 RX ADMIN — VANCOMYCIN HYDROCHLORIDE 250 MG: KIT at 05:38

## 2020-01-01 RX ADMIN — OXYCODONE 5 MG: 5 TABLET ORAL at 16:38

## 2020-01-01 RX ADMIN — CASTOR OIL AND BALSAM, PERU: 788; 87 OINTMENT TOPICAL at 09:03

## 2020-01-01 RX ADMIN — TERAZOSIN HYDROCHLORIDE 10 MG: 5 CAPSULE ORAL at 20:50

## 2020-01-01 RX ADMIN — OXYCODONE 5 MG: 5 TABLET ORAL at 05:40

## 2020-01-01 RX ADMIN — MICONAZOLE NITRATE: 2 CREAM TOPICAL at 08:57

## 2020-01-01 RX ADMIN — DAPTOMYCIN 550 MG: 500 INJECTION, POWDER, LYOPHILIZED, FOR SOLUTION INTRAVENOUS at 09:33

## 2020-01-01 RX ADMIN — POTASSIUM CHLORIDE 40 MEQ: 10 CAPSULE, COATED, EXTENDED RELEASE ORAL at 01:22

## 2020-01-01 RX ADMIN — AMLODIPINE BESYLATE 10 MG: 10 TABLET ORAL at 08:35

## 2020-01-01 RX ADMIN — Medication 5 MG: at 20:55

## 2020-01-01 RX ADMIN — ONDANSETRON 4 MG: 2 INJECTION INTRAMUSCULAR; INTRAVENOUS at 06:13

## 2020-01-01 RX ADMIN — OXYCODONE HYDROCHLORIDE AND ACETAMINOPHEN 1 TABLET: 10; 325 TABLET ORAL at 15:17

## 2020-01-01 RX ADMIN — METOPROLOL TARTRATE 100 MG: 100 TABLET ORAL at 15:35

## 2020-01-01 RX ADMIN — FUROSEMIDE 20 MG: 10 INJECTION, SOLUTION INTRAMUSCULAR; INTRAVENOUS at 10:33

## 2020-01-01 RX ADMIN — METRONIDAZOLE 500 MG: 500 INJECTION, SOLUTION INTRAVENOUS at 00:04

## 2020-01-01 RX ADMIN — AMLODIPINE BESYLATE 10 MG: 10 TABLET ORAL at 08:56

## 2020-01-01 RX ADMIN — SODIUM CHLORIDE 50 ML/HR: 9 INJECTION, SOLUTION INTRAVENOUS at 10:25

## 2020-01-01 RX ADMIN — CEFTRIAXONE 1 G: 1 INJECTION, POWDER, FOR SOLUTION INTRAMUSCULAR; INTRAVENOUS at 01:48

## 2020-01-01 RX ADMIN — GABAPENTIN 300 MG: 300 CAPSULE ORAL at 21:02

## 2020-01-01 RX ADMIN — FENTANYL 1 PATCH: 12 PATCH, EXTENDED RELEASE TRANSDERMAL at 08:44

## 2020-01-01 RX ADMIN — PROPOFOL 20 MG: 10 INJECTION, EMULSION INTRAVENOUS at 14:41

## 2020-01-01 RX ADMIN — VANCOMYCIN HYDROCHLORIDE 250 MG: KIT at 23:27

## 2020-01-01 RX ADMIN — TAZOBACTAM SODIUM AND PIPERACILLIN SODIUM 3.38 G: 375; 3 INJECTION, SOLUTION INTRAVENOUS at 13:52

## 2020-01-01 RX ADMIN — NYSTATIN: 100000 POWDER TOPICAL at 08:44

## 2020-01-01 RX ADMIN — HYDRALAZINE HYDROCHLORIDE 10 MG: 10 TABLET ORAL at 06:53

## 2020-01-01 RX ADMIN — VANCOMYCIN HYDROCHLORIDE 250 MG: KIT at 17:23

## 2020-01-01 RX ADMIN — INSULIN LISPRO 3 UNITS: 100 INJECTION, SOLUTION INTRAVENOUS; SUBCUTANEOUS at 11:58

## 2020-01-01 RX ADMIN — METOPROLOL TARTRATE 100 MG: 100 TABLET ORAL at 09:59

## 2020-01-01 RX ADMIN — HEPARIN SODIUM 5000 UNITS: 5000 INJECTION, SOLUTION INTRAVENOUS; SUBCUTANEOUS at 12:55

## 2020-01-01 RX ADMIN — OXYCODONE HYDROCHLORIDE 15 MG: 15 TABLET ORAL at 20:20

## 2020-01-01 RX ADMIN — INSULIN DETEMIR 20 UNITS: 100 INJECTION, SOLUTION SUBCUTANEOUS at 08:25

## 2020-01-01 RX ADMIN — Medication 250 MG: at 08:35

## 2020-01-01 RX ADMIN — HEPARIN SODIUM 5000 UNITS: 5000 INJECTION, SOLUTION INTRAVENOUS; SUBCUTANEOUS at 12:06

## 2020-01-01 RX ADMIN — METOPROLOL TARTRATE 100 MG: 100 TABLET ORAL at 21:27

## 2020-01-01 RX ADMIN — METRONIDAZOLE 500 MG: 500 INJECTION, SOLUTION INTRAVENOUS at 14:15

## 2020-01-01 RX ADMIN — SODIUM CHLORIDE, PRESERVATIVE FREE 10 ML: 5 INJECTION INTRAVENOUS at 11:37

## 2020-01-01 RX ADMIN — ONDANSETRON 4 MG: 2 INJECTION INTRAMUSCULAR; INTRAVENOUS at 20:31

## 2020-01-01 RX ADMIN — NYSTATIN 1 APPLICATION: 100000 CREAM TOPICAL at 20:15

## 2020-01-01 RX ADMIN — TAZOBACTAM SODIUM AND PIPERACILLIN SODIUM 3.38 G: 375; 3 INJECTION, SOLUTION INTRAVENOUS at 18:21

## 2020-01-01 RX ADMIN — TERAZOSIN HYDROCHLORIDE 10 MG: 5 CAPSULE ORAL at 21:57

## 2020-01-01 RX ADMIN — LIDOCAINE 1 PATCH: 50 PATCH CUTANEOUS at 08:51

## 2020-01-01 RX ADMIN — AMPICILLIN SODIUM AND SULBACTAM SODIUM 3 G: 2; 1 INJECTION, POWDER, FOR SOLUTION INTRAMUSCULAR; INTRAVENOUS at 14:59

## 2020-01-01 RX ADMIN — MELATONIN TAB 5 MG 5 MG: 5 TAB at 20:40

## 2020-01-01 RX ADMIN — MEROPENEM 1 G: 1 INJECTION, POWDER, FOR SOLUTION INTRAVENOUS at 01:33

## 2020-01-01 RX ADMIN — METOPROLOL TARTRATE 100 MG: 100 TABLET ORAL at 20:42

## 2020-01-01 RX ADMIN — TAZOBACTAM SODIUM AND PIPERACILLIN SODIUM 3.38 G: 375; 3 INJECTION, SOLUTION INTRAVENOUS at 03:24

## 2020-01-01 RX ADMIN — SODIUM CHLORIDE, PRESERVATIVE FREE 10 ML: 5 INJECTION INTRAVENOUS at 23:17

## 2020-01-01 RX ADMIN — METOPROLOL TARTRATE 100 MG: 100 TABLET ORAL at 21:00

## 2020-01-01 RX ADMIN — OXYCODONE 5 MG: 5 TABLET ORAL at 10:30

## 2020-01-01 RX ADMIN — DOCUSATE SODIUM 50MG AND SENNOSIDES 8.6MG 2 TABLET: 8.6; 5 TABLET, FILM COATED ORAL at 08:47

## 2020-01-01 RX ADMIN — MICONAZOLE NITRATE: 2 CREAM TOPICAL at 09:04

## 2020-01-01 RX ADMIN — MEROPENEM 1 G: 1 INJECTION, POWDER, FOR SOLUTION INTRAVENOUS at 17:46

## 2020-01-01 RX ADMIN — FUROSEMIDE 40 MG: 40 TABLET ORAL at 08:51

## 2020-01-01 RX ADMIN — VANCOMYCIN HYDROCHLORIDE 250 MG: KIT at 12:15

## 2020-01-01 RX ADMIN — METOPROLOL TARTRATE 100 MG: 100 TABLET ORAL at 09:18

## 2020-01-01 RX ADMIN — LIDOCAINE HYDROCHLORIDE 50 MG: 10 INJECTION, SOLUTION EPIDURAL; INFILTRATION; INTRACAUDAL; PERINEURAL at 12:09

## 2020-01-01 RX ADMIN — OXYCODONE HYDROCHLORIDE AND ACETAMINOPHEN 1 TABLET: 10; 325 TABLET ORAL at 21:50

## 2020-01-01 RX ADMIN — PHENYLEPHRINE HYDROCHLORIDE 80 MCG: 10 INJECTION INTRAVENOUS at 12:20

## 2020-01-01 RX ADMIN — TERAZOSIN HYDROCHLORIDE 10 MG: 5 CAPSULE ORAL at 21:55

## 2020-01-01 RX ADMIN — SODIUM CHLORIDE 75 ML/HR: 9 INJECTION, SOLUTION INTRAVENOUS at 20:44

## 2020-01-01 RX ADMIN — QUETIAPINE FUMARATE 12.5 MG: 25 TABLET ORAL at 23:23

## 2020-01-01 RX ADMIN — ASPIRIN 81 MG: 81 TABLET, COATED ORAL at 08:46

## 2020-01-01 RX ADMIN — AMPICILLIN SODIUM AND SULBACTAM SODIUM 3 G: 2; 1 INJECTION, POWDER, FOR SOLUTION INTRAMUSCULAR; INTRAVENOUS at 08:41

## 2020-01-01 RX ADMIN — METRONIDAZOLE 500 MG: 500 INJECTION, SOLUTION INTRAVENOUS at 14:26

## 2020-01-01 RX ADMIN — MEROPENEM 1 G: 1 INJECTION, POWDER, FOR SOLUTION INTRAVENOUS at 16:59

## 2020-01-01 RX ADMIN — AMPICILLIN SODIUM AND SULBACTAM SODIUM 3 G: 2; 1 INJECTION, POWDER, FOR SOLUTION INTRAMUSCULAR; INTRAVENOUS at 22:36

## 2020-01-01 RX ADMIN — SPIRONOLACTONE 25 MG: 25 TABLET ORAL at 08:15

## 2020-01-01 RX ADMIN — HYDRALAZINE HYDROCHLORIDE 10 MG: 10 TABLET ORAL at 21:08

## 2020-01-01 RX ADMIN — OXYCODONE 5 MG: 5 TABLET ORAL at 21:33

## 2020-01-01 RX ADMIN — DOCUSATE SODIUM 50MG AND SENNOSIDES 8.6MG 2 TABLET: 8.6; 5 TABLET, FILM COATED ORAL at 21:33

## 2020-01-01 RX ADMIN — DAPTOMYCIN 550 MG: 500 INJECTION, POWDER, LYOPHILIZED, FOR SOLUTION INTRAVENOUS at 12:03

## 2020-01-01 RX ADMIN — QUETIAPINE FUMARATE 12.5 MG: 25 TABLET ORAL at 20:14

## 2020-01-01 RX ADMIN — OXYCODONE HYDROCHLORIDE AND ACETAMINOPHEN 1 TABLET: 10; 325 TABLET ORAL at 21:23

## 2020-01-01 RX ADMIN — OXYCODONE HYDROCHLORIDE AND ACETAMINOPHEN 1 TABLET: 10; 325 TABLET ORAL at 09:54

## 2020-01-01 RX ADMIN — INSULIN LISPRO 4 UNITS: 100 INJECTION, SOLUTION INTRAVENOUS; SUBCUTANEOUS at 17:59

## 2020-01-01 RX ADMIN — SODIUM CHLORIDE, PRESERVATIVE FREE 10 ML: 5 INJECTION INTRAVENOUS at 20:03

## 2020-01-01 RX ADMIN — METRONIDAZOLE 500 MG: 500 INJECTION, SOLUTION INTRAVENOUS at 02:02

## 2020-01-01 RX ADMIN — HEPARIN SODIUM 5000 UNITS: 5000 INJECTION, SOLUTION INTRAVENOUS; SUBCUTANEOUS at 05:25

## 2020-01-01 RX ADMIN — INSULIN LISPRO 2 UNITS: 100 INJECTION, SOLUTION INTRAVENOUS; SUBCUTANEOUS at 11:38

## 2020-01-01 RX ADMIN — POTASSIUM CHLORIDE 40 MEQ: 10 CAPSULE, COATED, EXTENDED RELEASE ORAL at 13:22

## 2020-01-01 RX ADMIN — VANCOMYCIN HYDROCHLORIDE 125 MG: KIT at 05:24

## 2020-01-01 RX ADMIN — MICAFUNGIN SODIUM 100 MG: 20 INJECTION, POWDER, LYOPHILIZED, FOR SOLUTION INTRAVENOUS at 12:38

## 2020-01-01 RX ADMIN — INSULIN LISPRO 3 UNITS: 100 INJECTION, SOLUTION INTRAVENOUS; SUBCUTANEOUS at 17:19

## 2020-01-01 RX ADMIN — SODIUM CHLORIDE 1000 ML: 9 INJECTION, SOLUTION INTRAVENOUS at 18:31

## 2020-01-01 RX ADMIN — INSULIN LISPRO 4 UNITS: 100 INJECTION, SOLUTION INTRAVENOUS; SUBCUTANEOUS at 12:57

## 2020-01-01 RX ADMIN — HEPARIN SODIUM 5000 UNITS: 5000 INJECTION, SOLUTION INTRAVENOUS; SUBCUTANEOUS at 13:14

## 2020-01-01 RX ADMIN — SODIUM CHLORIDE, POTASSIUM CHLORIDE, SODIUM LACTATE AND CALCIUM CHLORIDE: 600; 310; 30; 20 INJECTION, SOLUTION INTRAVENOUS at 12:36

## 2020-01-01 RX ADMIN — QUETIAPINE FUMARATE 12.5 MG: 25 TABLET ORAL at 20:55

## 2020-01-01 RX ADMIN — SODIUM CHLORIDE 50 ML/HR: 9 INJECTION, SOLUTION INTRAVENOUS at 06:36

## 2020-01-01 RX ADMIN — ASPIRIN 81 MG: 81 TABLET, COATED ORAL at 09:24

## 2020-01-01 RX ADMIN — HEPARIN SODIUM 5000 UNITS: 5000 INJECTION, SOLUTION INTRAVENOUS; SUBCUTANEOUS at 05:16

## 2020-01-01 RX ADMIN — VANCOMYCIN HYDROCHLORIDE 250 MG: KIT at 06:56

## 2020-01-01 RX ADMIN — HEPARIN SODIUM 5000 UNITS: 5000 INJECTION INTRAVENOUS; SUBCUTANEOUS at 06:16

## 2020-01-01 RX ADMIN — CEFTRIAXONE 2 G: 2 INJECTION, POWDER, FOR SOLUTION INTRAMUSCULAR; INTRAVENOUS at 10:30

## 2020-01-01 RX ADMIN — MEROPENEM 1 G: 1 INJECTION, POWDER, FOR SOLUTION INTRAVENOUS at 21:37

## 2020-01-01 RX ADMIN — HEPARIN SODIUM 5000 UNITS: 5000 INJECTION, SOLUTION INTRAVENOUS; SUBCUTANEOUS at 15:19

## 2020-01-01 RX ADMIN — HYDRALAZINE HYDROCHLORIDE 10 MG: 10 TABLET ORAL at 21:15

## 2020-01-01 RX ADMIN — METOPROLOL TARTRATE 100 MG: 100 TABLET ORAL at 23:16

## 2020-01-01 RX ADMIN — CEFEPIME 2 G: 2 INJECTION, POWDER, FOR SOLUTION INTRAVENOUS at 11:04

## 2020-01-01 RX ADMIN — INSULIN LISPRO 4 UNITS: 100 INJECTION, SOLUTION INTRAVENOUS; SUBCUTANEOUS at 17:34

## 2020-01-01 RX ADMIN — MORPHINE SULFATE 4 MG: 4 INJECTION, SOLUTION INTRAMUSCULAR; INTRAVENOUS at 14:52

## 2020-01-01 RX ADMIN — SODIUM CHLORIDE, PRESERVATIVE FREE 10 ML: 5 INJECTION INTRAVENOUS at 08:30

## 2020-01-01 RX ADMIN — METOPROLOL TARTRATE 100 MG: 100 TABLET ORAL at 09:10

## 2020-01-01 RX ADMIN — SODIUM CHLORIDE 50 ML/HR: 9 INJECTION, SOLUTION INTRAVENOUS at 22:03

## 2020-01-01 RX ADMIN — CEFTRIAXONE SODIUM 2 G: 2 INJECTION, POWDER, FOR SOLUTION INTRAMUSCULAR; INTRAVENOUS at 10:30

## 2020-01-01 RX ADMIN — QUETIAPINE FUMARATE 12.5 MG: 25 TABLET ORAL at 21:02

## 2020-01-01 RX ADMIN — OXYCODONE 5 MG: 5 TABLET ORAL at 01:02

## 2020-01-01 RX ADMIN — INSULIN DETEMIR 12 UNITS: 100 INJECTION, SOLUTION SUBCUTANEOUS at 21:53

## 2020-01-01 RX ADMIN — METOPROLOL TARTRATE 100 MG: 100 TABLET ORAL at 09:33

## 2020-01-01 RX ADMIN — MICAFUNGIN SODIUM 100 MG: 20 INJECTION, POWDER, LYOPHILIZED, FOR SOLUTION INTRAVENOUS at 12:17

## 2020-01-01 RX ADMIN — MORPHINE SULFATE 2 MG: 2 INJECTION, SOLUTION INTRAMUSCULAR; INTRAVENOUS at 09:06

## 2020-01-01 RX ADMIN — METRONIDAZOLE 500 MG: 500 INJECTION, SOLUTION INTRAVENOUS at 02:36

## 2020-01-01 RX ADMIN — SODIUM CHLORIDE, PRESERVATIVE FREE 10 ML: 5 INJECTION INTRAVENOUS at 09:28

## 2020-01-01 RX ADMIN — ASPIRIN 81 MG: 81 TABLET, COATED ORAL at 08:54

## 2020-01-01 RX ADMIN — AMPICILLIN SODIUM AND SULBACTAM SODIUM 3 G: 2; 1 INJECTION, POWDER, FOR SOLUTION INTRAMUSCULAR; INTRAVENOUS at 15:08

## 2020-01-01 RX ADMIN — METRONIDAZOLE 500 MG: 500 INJECTION, SOLUTION INTRAVENOUS at 23:47

## 2020-01-01 RX ADMIN — ASPIRIN 81 MG: 81 TABLET, COATED ORAL at 08:30

## 2020-01-01 RX ADMIN — DEXAMETHASONE SODIUM PHOSPHATE 8 MG: 10 INJECTION INTRAMUSCULAR; INTRAVENOUS at 12:43

## 2020-01-01 RX ADMIN — SODIUM CHLORIDE, PRESERVATIVE FREE 10 ML: 5 INJECTION INTRAVENOUS at 09:27

## 2020-01-01 RX ADMIN — MEROPENEM 1 G: 1 INJECTION, POWDER, FOR SOLUTION INTRAVENOUS at 23:58

## 2020-01-01 RX ADMIN — OXYCODONE HYDROCHLORIDE AND ACETAMINOPHEN 1 TABLET: 10; 325 TABLET ORAL at 08:41

## 2020-01-01 RX ADMIN — OXYCODONE 5 MG: 5 TABLET ORAL at 21:58

## 2020-01-01 RX ADMIN — METOPROLOL TARTRATE 100 MG: 100 TABLET, FILM COATED ORAL at 20:50

## 2020-01-01 RX ADMIN — DULOXETINE HYDROCHLORIDE 60 MG: 60 CAPSULE, DELAYED RELEASE ORAL at 08:43

## 2020-01-01 RX ADMIN — MEROPENEM 1 G: 1 INJECTION, POWDER, FOR SOLUTION INTRAVENOUS at 10:42

## 2020-01-01 RX ADMIN — NYSTATIN 1 APPLICATION: 100000 CREAM TOPICAL at 20:13

## 2020-01-01 RX ADMIN — VANCOMYCIN HYDROCHLORIDE 250 MG: KIT at 17:36

## 2020-01-01 RX ADMIN — INSULIN LISPRO 2 UNITS: 100 INJECTION, SOLUTION INTRAVENOUS; SUBCUTANEOUS at 16:32

## 2020-01-01 RX ADMIN — VANCOMYCIN HYDROCHLORIDE 250 MG: KIT at 13:05

## 2020-01-01 RX ADMIN — METRONIDAZOLE 500 MG: 500 INJECTION, SOLUTION INTRAVENOUS at 16:48

## 2020-01-01 RX ADMIN — OXYCODONE HYDROCHLORIDE AND ACETAMINOPHEN 1 TABLET: 10; 325 TABLET ORAL at 00:05

## 2020-01-01 RX ADMIN — DOCUSATE SODIUM 100 MG: 100 CAPSULE ORAL at 09:13

## 2020-01-01 RX ADMIN — VANCOMYCIN HYDROCHLORIDE 125 MG: KIT at 17:11

## 2020-01-01 RX ADMIN — DULOXETINE HYDROCHLORIDE 30 MG: 30 CAPSULE, DELAYED RELEASE ORAL at 08:07

## 2020-01-01 RX ADMIN — AMLODIPINE BESYLATE 5 MG: 5 TABLET ORAL at 08:44

## 2020-01-01 RX ADMIN — AMLODIPINE BESYLATE 5 MG: 5 TABLET ORAL at 09:49

## 2020-01-01 RX ADMIN — OXYCODONE HYDROCHLORIDE AND ACETAMINOPHEN 2 TABLET: 5; 325 TABLET ORAL at 23:07

## 2020-01-01 RX ADMIN — LORAZEPAM 1 MG: 2 INJECTION INTRAMUSCULAR; INTRAVENOUS at 03:00

## 2020-01-01 RX ADMIN — INSULIN LISPRO 3 UNITS: 100 INJECTION, SOLUTION INTRAVENOUS; SUBCUTANEOUS at 17:53

## 2020-01-01 RX ADMIN — INSULIN LISPRO 3 UNITS: 100 INJECTION, SOLUTION INTRAVENOUS; SUBCUTANEOUS at 12:35

## 2020-01-01 RX ADMIN — VANCOMYCIN HYDROCHLORIDE 250 MG: KIT at 00:43

## 2020-01-01 RX ADMIN — MAGNESIUM SULFATE 2 G: 2 INJECTION INTRAVENOUS at 17:57

## 2020-01-01 RX ADMIN — HEPARIN SODIUM 5000 UNITS: 5000 INJECTION, SOLUTION INTRAVENOUS; SUBCUTANEOUS at 22:00

## 2020-01-01 RX ADMIN — HEPARIN SODIUM 5000 UNITS: 5000 INJECTION INTRAVENOUS; SUBCUTANEOUS at 23:22

## 2020-01-01 RX ADMIN — HEPARIN SODIUM 5000 UNITS: 5000 INJECTION, SOLUTION INTRAVENOUS; SUBCUTANEOUS at 05:38

## 2020-01-01 RX ADMIN — METRONIDAZOLE 500 MG: 500 INJECTION, SOLUTION INTRAVENOUS at 22:08

## 2020-01-01 RX ADMIN — TERAZOSIN HYDROCHLORIDE 10 MG: 5 CAPSULE ORAL at 20:40

## 2020-01-01 RX ADMIN — VANCOMYCIN HYDROCHLORIDE 250 MG: KIT at 01:21

## 2020-01-01 RX ADMIN — METOPROLOL TARTRATE 100 MG: 50 TABLET, FILM COATED ORAL at 22:29

## 2020-01-01 RX ADMIN — ASPIRIN 81 MG: 81 TABLET, FILM COATED ORAL at 08:56

## 2020-01-01 RX ADMIN — MAGNESIUM SULFATE 4 G: 4 INJECTION INTRAVENOUS at 06:31

## 2020-01-01 RX ADMIN — CLINDAMYCIN PHOSPHATE 600 MG: 600 INJECTION, SOLUTION INTRAVENOUS at 23:22

## 2020-01-01 RX ADMIN — DULOXETINE HYDROCHLORIDE 60 MG: 60 CAPSULE, DELAYED RELEASE ORAL at 10:40

## 2020-01-01 RX ADMIN — OXYCODONE 5 MG: 5 TABLET ORAL at 16:18

## 2020-01-01 RX ADMIN — INSULIN LISPRO 2 UNITS: 100 INJECTION, SOLUTION INTRAVENOUS; SUBCUTANEOUS at 13:00

## 2020-01-01 RX ADMIN — INSULIN LISPRO 3 UNITS: 100 INJECTION, SOLUTION INTRAVENOUS; SUBCUTANEOUS at 17:28

## 2020-01-01 RX ADMIN — TERAZOSIN HYDROCHLORIDE 10 MG: 5 CAPSULE ORAL at 19:44

## 2020-01-01 RX ADMIN — INSULIN LISPRO 2 UNITS: 100 INJECTION, SOLUTION INTRAVENOUS; SUBCUTANEOUS at 12:09

## 2020-01-01 RX ADMIN — INSULIN LISPRO 2 UNITS: 100 INJECTION, SOLUTION INTRAVENOUS; SUBCUTANEOUS at 17:29

## 2020-01-01 RX ADMIN — HEPARIN SODIUM 5000 UNITS: 5000 INJECTION, SOLUTION INTRAVENOUS; SUBCUTANEOUS at 13:50

## 2020-01-01 RX ADMIN — INSULIN LISPRO 2 UNITS: 100 INJECTION, SOLUTION INTRAVENOUS; SUBCUTANEOUS at 17:36

## 2020-01-01 RX ADMIN — SODIUM CHLORIDE 50 ML/HR: 9 INJECTION, SOLUTION INTRAVENOUS at 08:48

## 2020-01-01 RX ADMIN — OXYCODONE 5 MG: 5 TABLET ORAL at 20:41

## 2020-01-01 RX ADMIN — OXYCODONE HYDROCHLORIDE AND ACETAMINOPHEN 1 TABLET: 10; 325 TABLET ORAL at 01:32

## 2020-01-01 RX ADMIN — VANCOMYCIN HYDROCHLORIDE 250 MG: KIT at 05:23

## 2020-01-01 RX ADMIN — TAZOBACTAM SODIUM AND PIPERACILLIN SODIUM 3.38 G: 375; 3 INJECTION, SOLUTION INTRAVENOUS at 04:20

## 2020-01-01 RX ADMIN — TERAZOSIN HYDROCHLORIDE 5 MG: 5 CAPSULE ORAL at 21:15

## 2020-01-01 RX ADMIN — METOPROLOL TARTRATE 100 MG: 100 TABLET ORAL at 20:07

## 2020-01-01 RX ADMIN — NYSTATIN: 100000 POWDER TOPICAL at 20:30

## 2020-01-01 RX ADMIN — OXYCODONE 5 MG: 5 TABLET ORAL at 06:04

## 2020-01-01 RX ADMIN — METRONIDAZOLE 500 MG: 500 INJECTION, SOLUTION INTRAVENOUS at 09:52

## 2020-01-01 RX ADMIN — VANCOMYCIN HYDROCHLORIDE 250 MG: KIT at 12:06

## 2020-01-01 RX ADMIN — TERAZOSIN HYDROCHLORIDE 10 MG: 5 CAPSULE ORAL at 21:04

## 2020-01-01 RX ADMIN — MORPHINE SULFATE 2 MG: 2 INJECTION, SOLUTION INTRAMUSCULAR; INTRAVENOUS at 20:18

## 2020-01-01 RX ADMIN — AMLODIPINE BESYLATE 10 MG: 10 TABLET ORAL at 08:30

## 2020-01-01 RX ADMIN — METRONIDAZOLE 500 MG: 500 INJECTION, SOLUTION INTRAVENOUS at 10:24

## 2020-01-01 RX ADMIN — HEPARIN SODIUM 5000 UNITS: 5000 INJECTION, SOLUTION INTRAVENOUS; SUBCUTANEOUS at 13:54

## 2020-01-01 RX ADMIN — OXYCODONE 5 MG: 5 TABLET ORAL at 22:56

## 2020-01-01 RX ADMIN — FUROSEMIDE 40 MG: 10 INJECTION, SOLUTION INTRAMUSCULAR; INTRAVENOUS at 09:50

## 2020-01-01 RX ADMIN — INSULIN LISPRO 2 UNITS: 100 INJECTION, SOLUTION INTRAVENOUS; SUBCUTANEOUS at 10:30

## 2020-01-01 RX ADMIN — ASPIRIN 81 MG: 81 TABLET, COATED ORAL at 09:33

## 2020-01-01 RX ADMIN — METRONIDAZOLE 500 MG: 500 INJECTION, SOLUTION INTRAVENOUS at 12:07

## 2020-01-01 RX ADMIN — GABAPENTIN 100 MG: 100 CAPSULE ORAL at 10:29

## 2020-01-01 RX ADMIN — ONDANSETRON 4 MG: 2 INJECTION INTRAMUSCULAR; INTRAVENOUS at 23:49

## 2020-01-01 RX ADMIN — DULOXETINE HYDROCHLORIDE 60 MG: 60 CAPSULE, DELAYED RELEASE ORAL at 08:41

## 2020-01-01 RX ADMIN — MICONAZOLE NITRATE 1 APPLICATION: 2 CREAM TOPICAL at 20:57

## 2020-01-01 RX ADMIN — VANCOMYCIN HYDROCHLORIDE 125 MG: KIT at 01:07

## 2020-01-01 RX ADMIN — ESCITALOPRAM OXALATE 10 MG: 10 TABLET ORAL at 09:06

## 2020-01-01 RX ADMIN — INSULIN LISPRO 3 UNITS: 100 INJECTION, SOLUTION INTRAVENOUS; SUBCUTANEOUS at 08:48

## 2020-01-01 RX ADMIN — ASPIRIN 81 MG: 81 TABLET, COATED ORAL at 08:43

## 2020-01-01 RX ADMIN — INSULIN LISPRO 3 UNITS: 100 INJECTION, SOLUTION INTRAVENOUS; SUBCUTANEOUS at 17:46

## 2020-01-01 RX ADMIN — HEPARIN SODIUM 5000 UNITS: 5000 INJECTION, SOLUTION INTRAVENOUS; SUBCUTANEOUS at 13:06

## 2020-01-01 RX ADMIN — AMPICILLIN SODIUM AND SULBACTAM SODIUM 3 G: 2; 1 INJECTION, POWDER, FOR SOLUTION INTRAMUSCULAR; INTRAVENOUS at 21:58

## 2020-01-01 RX ADMIN — INSULIN LISPRO 3 UNITS: 100 INJECTION, SOLUTION INTRAVENOUS; SUBCUTANEOUS at 16:42

## 2020-01-01 RX ADMIN — Medication 250 MG: at 08:56

## 2020-01-01 RX ADMIN — HEPARIN SODIUM 5000 UNITS: 5000 INJECTION INTRAVENOUS; SUBCUTANEOUS at 05:17

## 2020-01-01 RX ADMIN — HYDRALAZINE HYDROCHLORIDE 50 MG: 50 TABLET, FILM COATED ORAL at 13:53

## 2020-01-01 RX ADMIN — AMPICILLIN SODIUM AND SULBACTAM SODIUM 3 G: 2; 1 INJECTION, POWDER, FOR SOLUTION INTRAMUSCULAR; INTRAVENOUS at 08:46

## 2020-01-01 RX ADMIN — METOPROLOL TARTRATE 100 MG: 100 TABLET ORAL at 20:13

## 2020-01-01 RX ADMIN — INSULIN LISPRO 4 UNITS: 100 INJECTION, SOLUTION INTRAVENOUS; SUBCUTANEOUS at 18:23

## 2020-01-01 RX ADMIN — NYSTATIN: 100000 POWDER TOPICAL at 15:25

## 2020-01-01 RX ADMIN — TAZOBACTAM SODIUM AND PIPERACILLIN SODIUM 3.38 G: 375; 3 INJECTION, SOLUTION INTRAVENOUS at 22:42

## 2020-01-01 RX ADMIN — OXYCODONE HYDROCHLORIDE AND ACETAMINOPHEN 1 TABLET: 10; 325 TABLET ORAL at 21:42

## 2020-01-01 RX ADMIN — TAZOBACTAM SODIUM AND PIPERACILLIN SODIUM 3.38 G: 375; 3 INJECTION, SOLUTION INTRAVENOUS at 03:36

## 2020-01-01 RX ADMIN — VANCOMYCIN HYDROCHLORIDE 125 MG: KIT at 23:29

## 2020-01-01 RX ADMIN — METOPROLOL TARTRATE 100 MG: 100 TABLET, FILM COATED ORAL at 20:09

## 2020-01-01 RX ADMIN — SODIUM CHLORIDE 125 ML/HR: 9 INJECTION, SOLUTION INTRAVENOUS at 11:21

## 2020-01-01 RX ADMIN — METRONIDAZOLE 500 MG: 500 INJECTION, SOLUTION INTRAVENOUS at 03:15

## 2020-01-01 RX ADMIN — VANCOMYCIN HYDROCHLORIDE 250 MG: KIT at 17:26

## 2020-01-01 RX ADMIN — HEPARIN SODIUM 5000 UNITS: 5000 INJECTION INTRAVENOUS; SUBCUTANEOUS at 20:02

## 2020-01-01 RX ADMIN — MEROPENEM 1 G: 1 INJECTION, POWDER, FOR SOLUTION INTRAVENOUS at 09:57

## 2020-01-01 RX ADMIN — METOPROLOL TARTRATE 100 MG: 100 TABLET ORAL at 21:10

## 2020-01-01 RX ADMIN — AMPICILLIN SODIUM AND SULBACTAM SODIUM 3 G: 2; 1 INJECTION, POWDER, FOR SOLUTION INTRAMUSCULAR; INTRAVENOUS at 02:33

## 2020-01-01 RX ADMIN — HYDRALAZINE HYDROCHLORIDE 10 MG: 10 TABLET ORAL at 14:24

## 2020-01-01 RX ADMIN — OXYCODONE HYDROCHLORIDE AND ACETAMINOPHEN 1 TABLET: 10; 325 TABLET ORAL at 09:32

## 2020-01-01 RX ADMIN — TERAZOSIN HYDROCHLORIDE 10 MG: 5 CAPSULE ORAL at 23:14

## 2020-01-01 RX ADMIN — GABAPENTIN 300 MG: 300 CAPSULE ORAL at 20:44

## 2020-01-01 RX ADMIN — INSULIN LISPRO 2 UNITS: 100 INJECTION, SOLUTION INTRAVENOUS; SUBCUTANEOUS at 08:58

## 2020-01-01 RX ADMIN — POTASSIUM CHLORIDE 40 MEQ: 10 CAPSULE, COATED, EXTENDED RELEASE ORAL at 15:35

## 2020-01-01 RX ADMIN — MEROPENEM 1 G: 1 INJECTION, POWDER, FOR SOLUTION INTRAVENOUS at 18:01

## 2020-01-01 RX ADMIN — METOPROLOL TARTRATE 100 MG: 100 TABLET ORAL at 08:34

## 2020-01-01 RX ADMIN — QUETIAPINE FUMARATE 12.5 MG: 25 TABLET ORAL at 21:55

## 2020-01-01 RX ADMIN — METOPROLOL TARTRATE 100 MG: 100 TABLET ORAL at 21:56

## 2020-01-01 RX ADMIN — HYDROCORTISONE: 25 CREAM TOPICAL at 21:17

## 2020-01-01 RX ADMIN — VANCOMYCIN HYDROCHLORIDE 250 MG: KIT at 17:33

## 2020-01-01 RX ADMIN — MEROPENEM 1 G: 1 INJECTION, POWDER, FOR SOLUTION INTRAVENOUS at 17:41

## 2020-01-01 RX ADMIN — GABAPENTIN 100 MG: 100 CAPSULE ORAL at 08:47

## 2020-01-01 RX ADMIN — VANCOMYCIN HYDROCHLORIDE 125 MG: KIT at 00:03

## 2020-01-01 RX ADMIN — ASPIRIN 81 MG: 81 TABLET, COATED ORAL at 10:43

## 2020-01-01 RX ADMIN — METOPROLOL TARTRATE 100 MG: 100 TABLET ORAL at 09:58

## 2020-01-01 RX ADMIN — OXYCODONE HYDROCHLORIDE AND ACETAMINOPHEN 1 TABLET: 5; 325 TABLET ORAL at 05:34

## 2020-01-01 RX ADMIN — METOPROLOL TARTRATE 100 MG: 100 TABLET ORAL at 21:24

## 2020-01-01 RX ADMIN — HEPARIN SODIUM 5000 UNITS: 5000 INJECTION INTRAVENOUS; SUBCUTANEOUS at 13:56

## 2020-01-01 RX ADMIN — INSULIN LISPRO 2 UNITS: 100 INJECTION, SOLUTION INTRAVENOUS; SUBCUTANEOUS at 18:32

## 2020-01-01 RX ADMIN — NYSTATIN: 100000 POWDER TOPICAL at 20:19

## 2020-01-01 RX ADMIN — METRONIDAZOLE 500 MG: 500 INJECTION, SOLUTION INTRAVENOUS at 10:04

## 2020-01-01 RX ADMIN — TAZOBACTAM SODIUM AND PIPERACILLIN SODIUM 3.38 G: 375; 3 INJECTION, SOLUTION INTRAVENOUS at 20:30

## 2020-01-01 RX ADMIN — MAGNESIUM SULFATE HEPTAHYDRATE 2 G: 40 INJECTION, SOLUTION INTRAVENOUS at 18:15

## 2020-01-01 RX ADMIN — AMPICILLIN SODIUM AND SULBACTAM SODIUM 3 G: 2; 1 INJECTION, POWDER, FOR SOLUTION INTRAMUSCULAR; INTRAVENOUS at 23:05

## 2020-01-01 RX ADMIN — HYDRALAZINE HYDROCHLORIDE 10 MG: 10 TABLET ORAL at 20:37

## 2020-01-01 RX ADMIN — SPIRONOLACTONE 25 MG: 25 TABLET ORAL at 08:42

## 2020-01-01 RX ADMIN — SODIUM CHLORIDE, PRESERVATIVE FREE 10 ML: 5 INJECTION INTRAVENOUS at 10:12

## 2020-01-01 RX ADMIN — QUETIAPINE FUMARATE 12.5 MG: 25 TABLET ORAL at 21:50

## 2020-01-01 RX ADMIN — GABAPENTIN 300 MG: 300 CAPSULE ORAL at 20:57

## 2020-01-01 RX ADMIN — OXYCODONE 5 MG: 5 TABLET ORAL at 17:53

## 2020-01-01 RX ADMIN — HEPARIN SODIUM 5000 UNITS: 5000 INJECTION, SOLUTION INTRAVENOUS; SUBCUTANEOUS at 06:36

## 2020-01-01 RX ADMIN — MICONAZOLE NITRATE 1 APPLICATION: 2 CREAM TOPICAL at 20:39

## 2020-01-01 RX ADMIN — METOPROLOL TARTRATE 100 MG: 100 TABLET ORAL at 08:47

## 2020-01-01 RX ADMIN — DULOXETINE HYDROCHLORIDE 60 MG: 60 CAPSULE, DELAYED RELEASE ORAL at 08:30

## 2020-01-01 RX ADMIN — VANCOMYCIN HYDROCHLORIDE 250 MG: KIT at 13:52

## 2020-01-01 RX ADMIN — OXYCODONE HYDROCHLORIDE AND ACETAMINOPHEN 1 TABLET: 10; 325 TABLET ORAL at 13:40

## 2020-01-01 RX ADMIN — INSULIN LISPRO 8 UNITS: 100 INJECTION, SOLUTION INTRAVENOUS; SUBCUTANEOUS at 17:17

## 2020-01-01 RX ADMIN — SPIRONOLACTONE 25 MG: 25 TABLET ORAL at 08:57

## 2020-01-01 RX ADMIN — GABAPENTIN 300 MG: 300 CAPSULE ORAL at 21:12

## 2020-01-01 RX ADMIN — HEPARIN SODIUM 5000 UNITS: 5000 INJECTION, SOLUTION INTRAVENOUS; SUBCUTANEOUS at 15:34

## 2020-01-01 RX ADMIN — TERAZOSIN HYDROCHLORIDE 5 MG: 5 CAPSULE ORAL at 22:42

## 2020-01-01 RX ADMIN — SODIUM CHLORIDE 100 ML/HR: 9 INJECTION, SOLUTION INTRAVENOUS at 05:34

## 2020-01-01 RX ADMIN — METRONIDAZOLE 500 MG: 500 INJECTION, SOLUTION INTRAVENOUS at 21:34

## 2020-01-01 RX ADMIN — INSULIN LISPRO 2 UNITS: 100 INJECTION, SOLUTION INTRAVENOUS; SUBCUTANEOUS at 12:43

## 2020-01-01 RX ADMIN — DOCUSATE SODIUM 100 MG: 100 CAPSULE ORAL at 20:49

## 2020-01-01 RX ADMIN — ASPIRIN 81 MG: 81 TABLET, COATED ORAL at 08:25

## 2020-01-01 RX ADMIN — INSULIN LISPRO 2 UNITS: 100 INJECTION, SOLUTION INTRAVENOUS; SUBCUTANEOUS at 11:55

## 2020-01-01 RX ADMIN — HYDRALAZINE HYDROCHLORIDE 10 MG: 10 TABLET ORAL at 21:16

## 2020-01-01 RX ADMIN — HYDRALAZINE HYDROCHLORIDE 10 MG: 10 TABLET ORAL at 14:49

## 2020-01-01 RX ADMIN — METOPROLOL TARTRATE 100 MG: 100 TABLET ORAL at 08:13

## 2020-01-01 RX ADMIN — SODIUM CHLORIDE, PRESERVATIVE FREE 10 ML: 5 INJECTION INTRAVENOUS at 08:36

## 2020-01-01 RX ADMIN — AMLODIPINE BESYLATE 5 MG: 5 TABLET ORAL at 09:01

## 2020-01-01 RX ADMIN — ASPIRIN 81 MG: 81 TABLET, COATED ORAL at 08:52

## 2020-01-01 RX ADMIN — OXYCODONE HYDROCHLORIDE AND ACETAMINOPHEN 1 TABLET: 10; 325 TABLET ORAL at 20:13

## 2020-01-01 RX ADMIN — METOPROLOL TARTRATE 100 MG: 100 TABLET ORAL at 09:24

## 2020-01-01 RX ADMIN — METOPROLOL TARTRATE 100 MG: 100 TABLET ORAL at 08:33

## 2020-01-01 RX ADMIN — FUROSEMIDE 40 MG: 40 TABLET ORAL at 08:34

## 2020-01-01 RX ADMIN — SODIUM CHLORIDE 50 ML/HR: 9 INJECTION, SOLUTION INTRAVENOUS at 08:32

## 2020-01-01 RX ADMIN — DULOXETINE HYDROCHLORIDE 60 MG: 60 CAPSULE, DELAYED RELEASE ORAL at 09:28

## 2020-01-01 RX ADMIN — MEROPENEM 1 G: 1 INJECTION, POWDER, FOR SOLUTION INTRAVENOUS at 09:51

## 2020-01-01 RX ADMIN — VANCOMYCIN HYDROCHLORIDE 250 MG: KIT at 18:13

## 2020-01-01 RX ADMIN — METRONIDAZOLE 500 MG: 500 INJECTION, SOLUTION INTRAVENOUS at 17:16

## 2020-01-01 RX ADMIN — SODIUM CHLORIDE, PRESERVATIVE FREE 10 ML: 5 INJECTION INTRAVENOUS at 09:17

## 2020-01-01 RX ADMIN — SODIUM CHLORIDE 50 ML/HR: 9 INJECTION, SOLUTION INTRAVENOUS at 18:19

## 2020-01-01 RX ADMIN — FUROSEMIDE 40 MG: 10 INJECTION, SOLUTION INTRAMUSCULAR; INTRAVENOUS at 17:56

## 2020-01-01 RX ADMIN — OXYCODONE 5 MG: 5 TABLET ORAL at 14:10

## 2020-01-01 RX ADMIN — SODIUM CHLORIDE 9 ML/HR: 9 INJECTION, SOLUTION INTRAVENOUS at 10:59

## 2020-01-01 RX ADMIN — HEPARIN SODIUM 5000 UNITS: 5000 INJECTION, SOLUTION INTRAVENOUS; SUBCUTANEOUS at 05:54

## 2020-01-01 RX ADMIN — METRONIDAZOLE 500 MG: 500 INJECTION, SOLUTION INTRAVENOUS at 09:13

## 2020-01-01 RX ADMIN — METRONIDAZOLE 500 MG: 500 INJECTION, SOLUTION INTRAVENOUS at 23:20

## 2020-01-01 RX ADMIN — HEPARIN SODIUM 5000 UNITS: 5000 INJECTION, SOLUTION INTRAVENOUS; SUBCUTANEOUS at 20:35

## 2020-01-01 RX ADMIN — POLYETHYLENE GLYCOL 3350 17 G: 17 POWDER, FOR SOLUTION ORAL at 12:08

## 2020-01-01 RX ADMIN — OXYCODONE HYDROCHLORIDE AND ACETAMINOPHEN 1 TABLET: 10; 325 TABLET ORAL at 21:26

## 2020-01-01 RX ADMIN — VANCOMYCIN HYDROCHLORIDE 250 MG: KIT at 14:03

## 2020-01-01 RX ADMIN — MEROPENEM 1 G: 1 INJECTION, POWDER, FOR SOLUTION INTRAVENOUS at 08:04

## 2020-01-01 RX ADMIN — OXYCODONE HYDROCHLORIDE AND ACETAMINOPHEN 1 TABLET: 10; 325 TABLET ORAL at 22:40

## 2020-01-01 RX ADMIN — METRONIDAZOLE 500 MG: 500 INJECTION, SOLUTION INTRAVENOUS at 02:00

## 2020-01-01 RX ADMIN — METOPROLOL TARTRATE 100 MG: 100 TABLET ORAL at 19:45

## 2020-01-01 RX ADMIN — OXYCODONE 5 MG: 5 TABLET ORAL at 14:47

## 2020-01-01 RX ADMIN — HEPARIN SODIUM 5000 UNITS: 5000 INJECTION, SOLUTION INTRAVENOUS; SUBCUTANEOUS at 05:48

## 2020-01-01 RX ADMIN — FUROSEMIDE 40 MG: 40 TABLET ORAL at 08:15

## 2020-01-01 RX ADMIN — INSULIN LISPRO 3 UNITS: 100 INJECTION, SOLUTION INTRAVENOUS; SUBCUTANEOUS at 18:18

## 2020-01-01 RX ADMIN — CEFTRIAXONE SODIUM 2 G: 2 INJECTION, POWDER, FOR SOLUTION INTRAMUSCULAR; INTRAVENOUS at 08:53

## 2020-01-01 RX ADMIN — OXYCODONE HYDROCHLORIDE AND ACETAMINOPHEN 1 TABLET: 10; 325 TABLET ORAL at 21:04

## 2020-01-01 RX ADMIN — INSULIN LISPRO 2 UNITS: 100 INJECTION, SOLUTION INTRAVENOUS; SUBCUTANEOUS at 16:42

## 2020-01-01 RX ADMIN — TERAZOSIN HYDROCHLORIDE 10 MG: 5 CAPSULE ORAL at 20:02

## 2020-01-01 RX ADMIN — MICONAZOLE NITRATE: 20 POWDER TOPICAL at 22:17

## 2020-01-01 RX ADMIN — DULOXETINE HYDROCHLORIDE 60 MG: 60 CAPSULE, DELAYED RELEASE ORAL at 09:53

## 2020-01-01 RX ADMIN — AMPICILLIN SODIUM AND SULBACTAM SODIUM 3 G: 2; 1 INJECTION, POWDER, FOR SOLUTION INTRAMUSCULAR; INTRAVENOUS at 20:32

## 2020-01-01 RX ADMIN — METOPROLOL TARTRATE 100 MG: 100 TABLET ORAL at 22:43

## 2020-01-01 RX ADMIN — VANCOMYCIN HYDROCHLORIDE 250 MG: KIT at 05:56

## 2020-01-01 RX ADMIN — AMLODIPINE BESYLATE 10 MG: 10 TABLET ORAL at 09:16

## 2020-01-01 RX ADMIN — SPIRONOLACTONE 25 MG: 25 TABLET ORAL at 09:28

## 2020-01-01 RX ADMIN — SPIRONOLACTONE 25 MG: 25 TABLET ORAL at 22:32

## 2020-01-01 RX ADMIN — INSULIN LISPRO 2 UNITS: 100 INJECTION, SOLUTION INTRAVENOUS; SUBCUTANEOUS at 09:50

## 2020-01-01 RX ADMIN — MORPHINE SULFATE 4 MG: 4 INJECTION, SOLUTION INTRAMUSCULAR; INTRAVENOUS at 03:01

## 2020-01-01 RX ADMIN — HEPARIN SODIUM 5000 UNITS: 5000 INJECTION, SOLUTION INTRAVENOUS; SUBCUTANEOUS at 21:00

## 2020-01-01 RX ADMIN — HYDRALAZINE HYDROCHLORIDE 10 MG: 10 TABLET ORAL at 13:06

## 2020-01-01 RX ADMIN — HYDRALAZINE HYDROCHLORIDE 10 MG: 10 TABLET ORAL at 06:04

## 2020-01-01 RX ADMIN — TERAZOSIN HYDROCHLORIDE 10 MG: 5 CAPSULE ORAL at 21:15

## 2020-01-01 RX ADMIN — DOCUSATE SODIUM 100 MG: 100 CAPSULE ORAL at 08:57

## 2020-01-01 RX ADMIN — Medication 250 MG: at 20:19

## 2020-01-01 RX ADMIN — ASPIRIN 81 MG: 81 TABLET, COATED ORAL at 08:34

## 2020-01-01 RX ADMIN — HEPARIN SODIUM 5000 UNITS: 5000 INJECTION, SOLUTION INTRAVENOUS; SUBCUTANEOUS at 21:50

## 2020-01-01 RX ADMIN — VANCOMYCIN HYDROCHLORIDE 125 MG: KIT at 04:48

## 2020-01-01 RX ADMIN — AMPICILLIN SODIUM AND SULBACTAM SODIUM 3 G: 2; 1 INJECTION, POWDER, FOR SOLUTION INTRAMUSCULAR; INTRAVENOUS at 07:00

## 2020-01-01 RX ADMIN — METRONIDAZOLE 500 MG: 500 INJECTION, SOLUTION INTRAVENOUS at 16:24

## 2020-01-01 RX ADMIN — QUETIAPINE FUMARATE 12.5 MG: 25 TABLET ORAL at 20:15

## 2020-01-01 RX ADMIN — DULOXETINE HYDROCHLORIDE 60 MG: 60 CAPSULE, DELAYED RELEASE ORAL at 08:35

## 2020-01-01 RX ADMIN — INSULIN DETEMIR 10 UNITS: 100 INJECTION, SOLUTION SUBCUTANEOUS at 21:28

## 2020-01-01 RX ADMIN — VANCOMYCIN HYDROCHLORIDE 250 MG: KIT at 23:59

## 2020-01-01 RX ADMIN — HYDRALAZINE HYDROCHLORIDE 50 MG: 50 TABLET, FILM COATED ORAL at 22:59

## 2020-01-01 RX ADMIN — DULOXETINE HYDROCHLORIDE 60 MG: 60 CAPSULE, DELAYED RELEASE ORAL at 09:52

## 2020-01-01 RX ADMIN — METRONIDAZOLE 500 MG: 500 INJECTION, SOLUTION INTRAVENOUS at 22:14

## 2020-01-01 RX ADMIN — DOCUSATE SODIUM 50MG AND SENNOSIDES 8.6MG 2 TABLET: 8.6; 5 TABLET, FILM COATED ORAL at 20:40

## 2020-01-01 RX ADMIN — HEPARIN SODIUM 5000 UNITS: 5000 INJECTION, SOLUTION INTRAVENOUS; SUBCUTANEOUS at 14:31

## 2020-01-01 RX ADMIN — METOPROLOL TARTRATE 100 MG: 100 TABLET ORAL at 08:43

## 2020-01-01 RX ADMIN — OXYCODONE HYDROCHLORIDE AND ACETAMINOPHEN 1 TABLET: 5; 325 TABLET ORAL at 10:09

## 2020-01-01 RX ADMIN — TERAZOSIN HYDROCHLORIDE 5 MG: 5 CAPSULE ORAL at 22:05

## 2020-01-01 RX ADMIN — Medication 250 MG: at 20:55

## 2020-01-01 RX ADMIN — HEPARIN SODIUM 5000 UNITS: 5000 INJECTION, SOLUTION INTRAVENOUS; SUBCUTANEOUS at 05:44

## 2020-01-01 RX ADMIN — MICONAZOLE NITRATE: 2 CREAM TOPICAL at 20:02

## 2020-01-01 RX ADMIN — INSULIN LISPRO 3 UNITS: 100 INJECTION, SOLUTION INTRAVENOUS; SUBCUTANEOUS at 12:28

## 2020-01-01 RX ADMIN — VANCOMYCIN HYDROCHLORIDE 125 MG: KIT at 17:42

## 2020-01-01 RX ADMIN — POTASSIUM CHLORIDE 40 MEQ: 10 CAPSULE, COATED, EXTENDED RELEASE ORAL at 14:31

## 2020-01-01 RX ADMIN — SODIUM CHLORIDE, PRESERVATIVE FREE 10 ML: 5 INJECTION INTRAVENOUS at 11:06

## 2020-01-01 RX ADMIN — VANCOMYCIN HYDROCHLORIDE 250 MG: KIT at 06:07

## 2020-01-01 RX ADMIN — INSULIN LISPRO 3 UNITS: 100 INJECTION, SOLUTION INTRAVENOUS; SUBCUTANEOUS at 08:42

## 2020-01-01 RX ADMIN — METOPROLOL TARTRATE 100 MG: 100 TABLET ORAL at 10:40

## 2020-01-01 RX ADMIN — TERAZOSIN HYDROCHLORIDE ANHYDROUS 10 MG: 5 CAPSULE ORAL at 20:04

## 2020-01-01 RX ADMIN — KETOROLAC TROMETHAMINE 15 MG: 15 INJECTION, SOLUTION INTRAMUSCULAR; INTRAVENOUS at 22:21

## 2020-01-01 RX ADMIN — TEMAZEPAM 7.5 MG: 7.5 CAPSULE ORAL at 02:00

## 2020-01-01 RX ADMIN — METRONIDAZOLE 500 MG: 500 INJECTION, SOLUTION INTRAVENOUS at 21:58

## 2020-01-01 RX ADMIN — AMLODIPINE BESYLATE 5 MG: 5 TABLET ORAL at 08:16

## 2020-01-01 RX ADMIN — FUROSEMIDE 40 MG: 10 INJECTION, SOLUTION INTRAMUSCULAR; INTRAVENOUS at 00:03

## 2020-01-01 RX ADMIN — VANCOMYCIN HYDROCHLORIDE 250 MG: KIT at 01:02

## 2020-01-01 RX ADMIN — HYDRALAZINE HYDROCHLORIDE 10 MG: 10 TABLET ORAL at 15:07

## 2020-01-01 RX ADMIN — NYSTATIN: 100000 POWDER TOPICAL at 08:13

## 2020-01-01 RX ADMIN — AMPICILLIN SODIUM AND SULBACTAM SODIUM 3 G: 2; 1 INJECTION, POWDER, FOR SOLUTION INTRAMUSCULAR; INTRAVENOUS at 15:47

## 2020-01-01 RX ADMIN — METRONIDAZOLE 500 MG: 500 INJECTION, SOLUTION INTRAVENOUS at 05:44

## 2020-01-01 RX ADMIN — SODIUM CHLORIDE, PRESERVATIVE FREE 10 ML: 5 INJECTION INTRAVENOUS at 21:59

## 2020-01-01 RX ADMIN — OXYCODONE HYDROCHLORIDE AND ACETAMINOPHEN 1 TABLET: 5; 325 TABLET ORAL at 05:44

## 2020-01-01 RX ADMIN — METOPROLOL TARTRATE 100 MG: 100 TABLET ORAL at 21:50

## 2020-01-01 RX ADMIN — HYDRALAZINE HYDROCHLORIDE 10 MG: 10 TABLET ORAL at 21:58

## 2020-01-01 RX ADMIN — METRONIDAZOLE 500 MG: 500 TABLET ORAL at 06:31

## 2020-01-01 RX ADMIN — ASPIRIN 81 MG: 81 TABLET, COATED ORAL at 09:57

## 2020-01-01 RX ADMIN — NYSTATIN 1 APPLICATION: 100000 CREAM TOPICAL at 08:57

## 2020-01-01 RX ADMIN — SODIUM CHLORIDE, PRESERVATIVE FREE 10 ML: 5 INJECTION INTRAVENOUS at 21:42

## 2020-01-01 RX ADMIN — MEROPENEM 1 G: 1 INJECTION, POWDER, FOR SOLUTION INTRAVENOUS at 16:22

## 2020-01-01 RX ADMIN — ONDANSETRON 4 MG: 2 INJECTION INTRAMUSCULAR; INTRAVENOUS at 05:58

## 2020-01-01 RX ADMIN — VANCOMYCIN HYDROCHLORIDE 125 MG: KIT at 06:19

## 2020-01-01 RX ADMIN — ONDANSETRON 4 MG: 2 INJECTION INTRAMUSCULAR; INTRAVENOUS at 17:52

## 2020-01-01 RX ADMIN — MEROPENEM 1 G: 1 INJECTION, POWDER, FOR SOLUTION INTRAVENOUS at 15:25

## 2020-01-01 RX ADMIN — TERAZOSIN HYDROCHLORIDE 10 MG: 5 CAPSULE ORAL at 21:16

## 2020-01-01 RX ADMIN — METRONIDAZOLE 500 MG: 500 INJECTION, SOLUTION INTRAVENOUS at 19:35

## 2020-01-01 RX ADMIN — HYDRALAZINE HYDROCHLORIDE 10 MG: 10 TABLET ORAL at 14:28

## 2020-01-01 RX ADMIN — KETOROLAC TROMETHAMINE 15 MG: 15 INJECTION, SOLUTION INTRAMUSCULAR; INTRAVENOUS at 02:46

## 2020-01-01 RX ADMIN — DULOXETINE HYDROCHLORIDE 60 MG: 60 CAPSULE, DELAYED RELEASE ORAL at 09:24

## 2020-01-01 RX ADMIN — INSULIN LISPRO 3 UNITS: 100 INJECTION, SOLUTION INTRAVENOUS; SUBCUTANEOUS at 13:51

## 2020-01-01 RX ADMIN — GABAPENTIN 100 MG: 100 CAPSULE ORAL at 08:46

## 2020-01-01 RX ADMIN — METRONIDAZOLE 500 MG: 500 INJECTION, SOLUTION INTRAVENOUS at 22:17

## 2020-01-01 RX ADMIN — VANCOMYCIN HYDROCHLORIDE 250 MG: KIT at 05:36

## 2020-01-01 RX ADMIN — QUETIAPINE FUMARATE 12.5 MG: 25 TABLET ORAL at 22:35

## 2020-01-01 RX ADMIN — INSULIN LISPRO 2 UNITS: 100 INJECTION, SOLUTION INTRAVENOUS; SUBCUTANEOUS at 17:12

## 2020-01-01 RX ADMIN — MICAFUNGIN SODIUM 100 MG: 20 INJECTION, POWDER, LYOPHILIZED, FOR SOLUTION INTRAVENOUS at 13:04

## 2020-01-01 RX ADMIN — METRONIDAZOLE 500 MG: 500 INJECTION, SOLUTION INTRAVENOUS at 12:05

## 2020-01-01 RX ADMIN — DULOXETINE HYDROCHLORIDE 60 MG: 60 CAPSULE, DELAYED RELEASE ORAL at 08:23

## 2020-01-01 RX ADMIN — MEROPENEM 1 G: 1 INJECTION, POWDER, FOR SOLUTION INTRAVENOUS at 08:52

## 2020-01-01 RX ADMIN — HYDRALAZINE HYDROCHLORIDE 10 MG: 10 TABLET ORAL at 06:10

## 2020-01-01 RX ADMIN — METRONIDAZOLE 500 MG: 500 INJECTION, SOLUTION INTRAVENOUS at 17:53

## 2020-01-01 RX ADMIN — METOPROLOL TARTRATE 100 MG: 100 TABLET ORAL at 22:35

## 2020-01-01 RX ADMIN — VANCOMYCIN HYDROCHLORIDE 2750 MG: 10 INJECTION, POWDER, LYOPHILIZED, FOR SOLUTION INTRAVENOUS at 05:46

## 2020-01-01 RX ADMIN — ASPIRIN 81 MG: 81 TABLET, COATED ORAL at 08:23

## 2020-01-01 RX ADMIN — HYDRALAZINE HYDROCHLORIDE 10 MG: 10 TABLET ORAL at 05:39

## 2020-01-01 RX ADMIN — MICAFUNGIN SODIUM 100 MG: 20 INJECTION, POWDER, LYOPHILIZED, FOR SOLUTION INTRAVENOUS at 10:57

## 2020-01-01 RX ADMIN — MEROPENEM 1 G: 1 INJECTION, POWDER, FOR SOLUTION INTRAVENOUS at 00:45

## 2020-01-01 RX ADMIN — Medication 250 MG: at 08:52

## 2020-01-01 RX ADMIN — INSULIN LISPRO 3 UNITS: 100 INJECTION, SOLUTION INTRAVENOUS; SUBCUTANEOUS at 18:51

## 2020-01-01 RX ADMIN — VANCOMYCIN HYDROCHLORIDE 250 MG: KIT at 17:16

## 2020-01-01 RX ADMIN — METRONIDAZOLE 500 MG: 500 INJECTION, SOLUTION INTRAVENOUS at 21:21

## 2020-01-01 RX ADMIN — MAGNESIUM SULFATE HEPTAHYDRATE 4 G: 40 INJECTION, SOLUTION INTRAVENOUS at 06:46

## 2020-01-01 RX ADMIN — MEROPENEM 1 G: 1 INJECTION, POWDER, FOR SOLUTION INTRAVENOUS at 09:40

## 2020-01-01 RX ADMIN — HEPARIN SODIUM 5000 UNITS: 5000 INJECTION, SOLUTION INTRAVENOUS; SUBCUTANEOUS at 22:30

## 2020-01-01 RX ADMIN — DAPTOMYCIN 400 MG: 500 INJECTION, POWDER, LYOPHILIZED, FOR SOLUTION INTRAVENOUS at 11:14

## 2020-01-01 RX ADMIN — HEPARIN SODIUM 5000 UNITS: 5000 INJECTION, SOLUTION INTRAVENOUS; SUBCUTANEOUS at 23:06

## 2020-01-01 RX ADMIN — SODIUM CHLORIDE 75 ML/HR: 9 INJECTION, SOLUTION INTRAVENOUS at 12:33

## 2020-01-01 RX ADMIN — TERAZOSIN HYDROCHLORIDE 10 MG: 5 CAPSULE ORAL at 20:51

## 2020-01-01 RX ADMIN — DULOXETINE HYDROCHLORIDE 60 MG: 60 CAPSULE, DELAYED RELEASE ORAL at 08:13

## 2020-01-01 RX ADMIN — DULOXETINE HYDROCHLORIDE 60 MG: 60 CAPSULE, DELAYED RELEASE ORAL at 10:10

## 2020-01-01 RX ADMIN — INSULIN LISPRO 2 UNITS: 100 INJECTION, SOLUTION INTRAVENOUS; SUBCUTANEOUS at 17:33

## 2020-01-01 RX ADMIN — OXYCODONE HYDROCHLORIDE AND ACETAMINOPHEN 1 TABLET: 10; 325 TABLET ORAL at 21:36

## 2020-01-01 RX ADMIN — VANCOMYCIN HYDROCHLORIDE 125 MG: KIT at 18:13

## 2020-01-01 RX ADMIN — HYDRALAZINE HYDROCHLORIDE 10 MG: 10 TABLET ORAL at 06:15

## 2020-01-01 RX ADMIN — ASPIRIN 81 MG: 81 TABLET, COATED ORAL at 09:53

## 2020-01-01 RX ADMIN — TERAZOSIN HYDROCHLORIDE 5 MG: 5 CAPSULE ORAL at 20:07

## 2020-01-01 RX ADMIN — VANCOMYCIN HYDROCHLORIDE 250 MG: KIT at 23:55

## 2020-01-01 RX ADMIN — QUETIAPINE FUMARATE 25 MG: 25 TABLET ORAL at 21:24

## 2020-01-01 RX ADMIN — MICAFUNGIN SODIUM 100 MG: 20 INJECTION, POWDER, LYOPHILIZED, FOR SOLUTION INTRAVENOUS at 12:34

## 2020-01-01 RX ADMIN — LORAZEPAM 1 MG: 2 INJECTION INTRAMUSCULAR; INTRAVENOUS at 01:26

## 2020-01-01 RX ADMIN — DULOXETINE HYDROCHLORIDE 60 MG: 60 CAPSULE, DELAYED RELEASE ORAL at 09:30

## 2020-01-01 RX ADMIN — MICAFUNGIN SODIUM 100 MG: 20 INJECTION, POWDER, LYOPHILIZED, FOR SOLUTION INTRAVENOUS at 13:49

## 2020-01-01 RX ADMIN — METOPROLOL TARTRATE 100 MG: 100 TABLET, FILM COATED ORAL at 09:49

## 2020-01-01 RX ADMIN — SODIUM CHLORIDE, PRESERVATIVE FREE 10 ML: 5 INJECTION INTRAVENOUS at 09:09

## 2020-01-01 RX ADMIN — ONDANSETRON 4 MG: 2 INJECTION INTRAMUSCULAR; INTRAVENOUS at 12:24

## 2020-01-01 RX ADMIN — INSULIN LISPRO 3 UNITS: 100 INJECTION, SOLUTION INTRAVENOUS; SUBCUTANEOUS at 11:38

## 2020-01-01 RX ADMIN — LORAZEPAM 1 MG: 2 INJECTION INTRAMUSCULAR; INTRAVENOUS at 17:14

## 2020-01-01 RX ADMIN — INSULIN LISPRO 2 UNITS: 100 INJECTION, SOLUTION INTRAVENOUS; SUBCUTANEOUS at 09:24

## 2020-01-01 RX ADMIN — VANCOMYCIN HYDROCHLORIDE 250 MG: KIT at 05:16

## 2020-01-01 RX ADMIN — ONDANSETRON 4 MG: 2 INJECTION INTRAMUSCULAR; INTRAVENOUS at 12:28

## 2020-01-01 RX ADMIN — METRONIDAZOLE 500 MG: 500 INJECTION, SOLUTION INTRAVENOUS at 16:51

## 2020-01-01 RX ADMIN — METOPROLOL TARTRATE 100 MG: 100 TABLET ORAL at 10:44

## 2020-01-01 RX ADMIN — OXYCODONE 5 MG: 5 TABLET ORAL at 15:22

## 2020-01-01 RX ADMIN — HYDRALAZINE HYDROCHLORIDE 10 MG: 10 TABLET ORAL at 06:05

## 2020-01-01 RX ADMIN — VANCOMYCIN HYDROCHLORIDE 250 MG: KIT at 05:40

## 2020-01-01 RX ADMIN — FUROSEMIDE 60 MG: 10 INJECTION, SOLUTION INTRAMUSCULAR; INTRAVENOUS at 09:18

## 2020-01-01 RX ADMIN — MICONAZOLE NITRATE: 20 POWDER TOPICAL at 23:17

## 2020-01-01 RX ADMIN — INSULIN DETEMIR 10 UNITS: 100 INJECTION, SOLUTION SUBCUTANEOUS at 22:43

## 2020-01-01 RX ADMIN — ASPIRIN 81 MG: 81 TABLET, COATED ORAL at 09:27

## 2020-01-01 RX ADMIN — GABAPENTIN 300 MG: 300 CAPSULE ORAL at 21:58

## 2020-01-01 RX ADMIN — MORPHINE SULFATE 4 MG: 4 INJECTION, SOLUTION INTRAMUSCULAR; INTRAVENOUS at 13:01

## 2020-01-01 RX ADMIN — POTASSIUM CHLORIDE AND SODIUM CHLORIDE 75 ML/HR: 900; 150 INJECTION, SOLUTION INTRAVENOUS at 19:46

## 2020-01-01 RX ADMIN — SODIUM CHLORIDE, PRESERVATIVE FREE 10 ML: 5 INJECTION INTRAVENOUS at 08:58

## 2020-01-01 RX ADMIN — METRONIDAZOLE 500 MG: 500 INJECTION, SOLUTION INTRAVENOUS at 10:59

## 2020-01-01 RX ADMIN — POTASSIUM CHLORIDE 40 MEQ: 1.5 POWDER, FOR SOLUTION ORAL at 10:16

## 2020-01-01 RX ADMIN — HEPARIN SODIUM 5000 UNITS: 5000 INJECTION INTRAVENOUS; SUBCUTANEOUS at 06:11

## 2020-01-01 RX ADMIN — INSULIN LISPRO 10 UNITS: 100 INJECTION, SOLUTION INTRAVENOUS; SUBCUTANEOUS at 09:51

## 2020-01-01 RX ADMIN — OXYCODONE HYDROCHLORIDE 15 MG: 15 TABLET ORAL at 20:09

## 2020-01-01 RX ADMIN — OXYCODONE HYDROCHLORIDE AND ACETAMINOPHEN 1 TABLET: 5; 325 TABLET ORAL at 02:23

## 2020-01-01 RX ADMIN — METOPROLOL TARTRATE 100 MG: 100 TABLET ORAL at 08:23

## 2020-01-01 RX ADMIN — AMPICILLIN SODIUM AND SULBACTAM SODIUM 3 G: 2; 1 INJECTION, POWDER, FOR SOLUTION INTRAMUSCULAR; INTRAVENOUS at 14:28

## 2020-01-01 RX ADMIN — DULOXETINE HYDROCHLORIDE 60 MG: 60 CAPSULE, DELAYED RELEASE ORAL at 08:34

## 2020-01-01 RX ADMIN — VANCOMYCIN HYDROCHLORIDE 250 MG: KIT at 23:47

## 2020-01-01 RX ADMIN — SODIUM CHLORIDE, PRESERVATIVE FREE 10 ML: 5 INJECTION INTRAVENOUS at 10:00

## 2020-01-01 RX ADMIN — INSULIN LISPRO 3 UNITS: 100 INJECTION, SOLUTION INTRAVENOUS; SUBCUTANEOUS at 09:00

## 2020-01-01 RX ADMIN — LIDOCAINE HYDROCHLORIDE 11 ML: 20 JELLY TOPICAL at 02:37

## 2020-01-01 RX ADMIN — SODIUM CHLORIDE, PRESERVATIVE FREE 10 ML: 5 INJECTION INTRAVENOUS at 21:02

## 2020-01-01 RX ADMIN — DULOXETINE HYDROCHLORIDE 60 MG: 60 CAPSULE, DELAYED RELEASE ORAL at 10:28

## 2020-01-01 RX ADMIN — DULOXETINE HYDROCHLORIDE 60 MG: 60 CAPSULE, DELAYED RELEASE ORAL at 09:02

## 2020-01-01 RX ADMIN — AMPICILLIN SODIUM AND SULBACTAM SODIUM 3 G: 2; 1 INJECTION, POWDER, FOR SOLUTION INTRAMUSCULAR; INTRAVENOUS at 14:25

## 2020-01-01 RX ADMIN — Medication 250 MG: at 08:51

## 2020-01-01 RX ADMIN — GABAPENTIN 300 MG: 300 CAPSULE ORAL at 21:23

## 2020-01-01 RX ADMIN — METOPROLOL TARTRATE 100 MG: 100 TABLET ORAL at 21:04

## 2020-01-01 RX ADMIN — VANCOMYCIN HYDROCHLORIDE 250 MG: KIT at 23:07

## 2020-01-01 RX ADMIN — AMPICILLIN SODIUM AND SULBACTAM SODIUM 3 G: 2; 1 INJECTION, POWDER, FOR SOLUTION INTRAMUSCULAR; INTRAVENOUS at 13:10

## 2020-01-01 RX ADMIN — GABAPENTIN 300 MG: 300 CAPSULE ORAL at 21:11

## 2020-01-01 RX ADMIN — VANCOMYCIN HYDROCHLORIDE 250 MG: KIT at 11:36

## 2020-01-01 RX ADMIN — OXYCODONE 5 MG: 5 TABLET ORAL at 17:11

## 2020-01-01 RX ADMIN — HEPARIN SODIUM 5000 UNITS: 5000 INJECTION INTRAVENOUS; SUBCUTANEOUS at 15:00

## 2020-01-01 RX ADMIN — METRONIDAZOLE 500 MG: 500 INJECTION, SOLUTION INTRAVENOUS at 22:44

## 2020-01-01 RX ADMIN — OXYCODONE HYDROCHLORIDE AND ACETAMINOPHEN 1 TABLET: 10; 325 TABLET ORAL at 08:25

## 2020-01-01 RX ADMIN — METRONIDAZOLE 500 MG: 500 INJECTION, SOLUTION INTRAVENOUS at 23:18

## 2020-01-01 RX ADMIN — HEPARIN SODIUM 5000 UNITS: 5000 INJECTION, SOLUTION INTRAVENOUS; SUBCUTANEOUS at 05:39

## 2020-01-01 RX ADMIN — HEPARIN SODIUM 5000 UNITS: 5000 INJECTION, SOLUTION INTRAVENOUS; SUBCUTANEOUS at 13:30

## 2020-01-01 RX ADMIN — METOPROLOL TARTRATE 100 MG: 100 TABLET ORAL at 09:26

## 2020-01-01 RX ADMIN — HYDRALAZINE HYDROCHLORIDE 10 MG: 10 TABLET ORAL at 13:09

## 2020-01-01 RX ADMIN — MICONAZOLE NITRATE: 20 POWDER TOPICAL at 12:25

## 2020-01-01 RX ADMIN — SODIUM CHLORIDE 50 ML/HR: 9 INJECTION, SOLUTION INTRAVENOUS at 10:20

## 2020-01-01 RX ADMIN — METOPROLOL TARTRATE 100 MG: 100 TABLET ORAL at 21:17

## 2020-01-01 RX ADMIN — HEPARIN SODIUM 5000 UNITS: 5000 INJECTION INTRAVENOUS; SUBCUTANEOUS at 08:15

## 2020-01-01 RX ADMIN — FUROSEMIDE 40 MG: 40 TABLET ORAL at 08:25

## 2020-01-01 RX ADMIN — HYDRALAZINE HYDROCHLORIDE 10 MG: 10 TABLET ORAL at 05:41

## 2020-01-01 RX ADMIN — METRONIDAZOLE 500 MG: 500 INJECTION, SOLUTION INTRAVENOUS at 05:55

## 2020-01-01 RX ADMIN — ONDANSETRON 4 MG: 2 INJECTION INTRAMUSCULAR; INTRAVENOUS at 17:46

## 2020-01-01 RX ADMIN — Medication 250 MG: at 09:13

## 2020-01-01 RX ADMIN — AMPICILLIN SODIUM AND SULBACTAM SODIUM 3 G: 2; 1 INJECTION, POWDER, FOR SOLUTION INTRAMUSCULAR; INTRAVENOUS at 05:33

## 2020-01-01 RX ADMIN — MEROPENEM 1 G: 1 INJECTION, POWDER, FOR SOLUTION INTRAVENOUS at 01:09

## 2020-01-01 RX ADMIN — CEFTRIAXONE 2 G: 2 INJECTION, POWDER, FOR SOLUTION INTRAMUSCULAR; INTRAVENOUS at 11:21

## 2020-01-01 RX ADMIN — INSULIN LISPRO 2 UNITS: 100 INJECTION, SOLUTION INTRAVENOUS; SUBCUTANEOUS at 11:29

## 2020-01-01 RX ADMIN — HYDRALAZINE HYDROCHLORIDE 10 MG: 10 TABLET ORAL at 13:57

## 2020-01-01 RX ADMIN — INSULIN DETEMIR 10 UNITS: 100 INJECTION, SOLUTION SUBCUTANEOUS at 21:47

## 2020-01-01 RX ADMIN — FUROSEMIDE 40 MG: 10 INJECTION, SOLUTION INTRAMUSCULAR; INTRAVENOUS at 12:33

## 2020-01-01 RX ADMIN — BISACODYL 10 MG: 10 SUPPOSITORY RECTAL at 08:36

## 2020-01-01 RX ADMIN — LIDOCAINE 1 PATCH: 50 PATCH CUTANEOUS at 18:14

## 2020-01-01 RX ADMIN — DULOXETINE HYDROCHLORIDE 60 MG: 60 CAPSULE, DELAYED RELEASE ORAL at 08:15

## 2020-01-01 RX ADMIN — HYDRALAZINE HYDROCHLORIDE 10 MG: 10 TABLET ORAL at 15:17

## 2020-01-01 RX ADMIN — ONDANSETRON 4 MG: 2 INJECTION INTRAMUSCULAR; INTRAVENOUS at 22:05

## 2020-01-01 RX ADMIN — OXYCODONE HYDROCHLORIDE AND ACETAMINOPHEN 1 TABLET: 10; 325 TABLET ORAL at 20:30

## 2020-01-01 RX ADMIN — QUETIAPINE FUMARATE 12.5 MG: 25 TABLET ORAL at 23:10

## 2020-01-01 RX ADMIN — METOPROLOL TARTRATE 100 MG: 100 TABLET ORAL at 09:16

## 2020-01-01 RX ADMIN — HEPARIN SODIUM 5000 UNITS: 5000 INJECTION, SOLUTION INTRAVENOUS; SUBCUTANEOUS at 06:04

## 2020-01-01 RX ADMIN — AMPICILLIN SODIUM AND SULBACTAM SODIUM 3 G: 2; 1 INJECTION, POWDER, FOR SOLUTION INTRAMUSCULAR; INTRAVENOUS at 01:17

## 2020-01-01 RX ADMIN — INSULIN LISPRO 3 UNITS: 100 INJECTION, SOLUTION INTRAVENOUS; SUBCUTANEOUS at 17:16

## 2020-01-01 RX ADMIN — OXYCODONE HYDROCHLORIDE AND ACETAMINOPHEN 1 TABLET: 10; 325 TABLET ORAL at 12:59

## 2020-01-01 RX ADMIN — VANCOMYCIN HYDROCHLORIDE 250 MG: KIT at 08:32

## 2020-01-01 RX ADMIN — HYDRALAZINE HYDROCHLORIDE 10 MG: 10 TABLET ORAL at 08:34

## 2020-01-01 RX ADMIN — GABAPENTIN 300 MG: 300 CAPSULE ORAL at 21:15

## 2020-01-01 RX ADMIN — NYSTATIN: 100000 POWDER TOPICAL at 09:53

## 2020-01-01 RX ADMIN — INSULIN LISPRO 3 UNITS: 100 INJECTION, SOLUTION INTRAVENOUS; SUBCUTANEOUS at 12:54

## 2020-01-01 RX ADMIN — METRONIDAZOLE 500 MG: 500 INJECTION, SOLUTION INTRAVENOUS at 02:03

## 2020-01-01 RX ADMIN — GLYCOPYRROLATE 0.2 MG: 0.2 INJECTION INTRAMUSCULAR; INTRAVENOUS at 20:18

## 2020-01-01 RX ADMIN — MEROPENEM 1 G: 1 INJECTION, POWDER, FOR SOLUTION INTRAVENOUS at 18:44

## 2020-01-01 RX ADMIN — GABAPENTIN 100 MG: 100 CAPSULE ORAL at 09:09

## 2020-01-01 RX ADMIN — LORAZEPAM 1 MG: 2 INJECTION INTRAMUSCULAR; INTRAVENOUS at 10:54

## 2020-01-01 RX ADMIN — HYDRALAZINE HYDROCHLORIDE 10 MG: 10 TABLET ORAL at 22:06

## 2020-01-01 RX ADMIN — METRONIDAZOLE 500 MG: 500 INJECTION, SOLUTION INTRAVENOUS at 23:19

## 2020-01-01 RX ADMIN — OXYCODONE HYDROCHLORIDE AND ACETAMINOPHEN 1 TABLET: 10; 325 TABLET ORAL at 21:47

## 2020-01-01 RX ADMIN — DEXTROSE MONOHYDRATE 25 G: 25 INJECTION, SOLUTION INTRAVENOUS at 07:50

## 2020-01-01 RX ADMIN — HYDRALAZINE HYDROCHLORIDE 10 MG: 10 TABLET ORAL at 15:47

## 2020-01-01 RX ADMIN — HEPARIN SODIUM 5000 UNITS: 5000 INJECTION, SOLUTION INTRAVENOUS; SUBCUTANEOUS at 15:01

## 2020-01-01 RX ADMIN — TERAZOSIN HYDROCHLORIDE 10 MG: 5 CAPSULE ORAL at 21:22

## 2020-01-01 RX ADMIN — METOPROLOL TARTRATE 100 MG: 100 TABLET ORAL at 10:28

## 2020-01-01 RX ADMIN — GABAPENTIN 300 MG: 300 CAPSULE ORAL at 23:15

## 2020-01-01 RX ADMIN — HYDRALAZINE HYDROCHLORIDE 10 MG: 10 TABLET ORAL at 20:35

## 2020-01-01 RX ADMIN — LORAZEPAM 0.25 MG: 2 INJECTION INTRAMUSCULAR; INTRAVENOUS at 10:37

## 2020-01-01 RX ADMIN — QUETIAPINE FUMARATE 12.5 MG: 25 TABLET ORAL at 20:42

## 2020-01-01 RX ADMIN — SODIUM CHLORIDE, PRESERVATIVE FREE 10 ML: 5 INJECTION INTRAVENOUS at 08:42

## 2020-01-01 RX ADMIN — NYSTATIN 1 APPLICATION: 100000 CREAM TOPICAL at 09:03

## 2020-01-01 RX ADMIN — OXYCODONE 5 MG: 5 TABLET ORAL at 21:23

## 2020-01-01 RX ADMIN — METRONIDAZOLE 500 MG: 500 INJECTION, SOLUTION INTRAVENOUS at 17:26

## 2020-01-01 RX ADMIN — HYDRALAZINE HYDROCHLORIDE 10 MG: 20 INJECTION INTRAMUSCULAR; INTRAVENOUS at 04:16

## 2020-01-01 RX ADMIN — TERAZOSIN HYDROCHLORIDE 10 MG: 5 CAPSULE ORAL at 21:26

## 2020-01-01 RX ADMIN — METRONIDAZOLE 500 MG: 500 INJECTION, SOLUTION INTRAVENOUS at 22:50

## 2020-01-01 RX ADMIN — OXYCODONE 5 MG: 5 TABLET ORAL at 10:59

## 2020-01-01 RX ADMIN — OXYCODONE HYDROCHLORIDE AND ACETAMINOPHEN 1 TABLET: 5; 325 TABLET ORAL at 15:07

## 2020-01-01 RX ADMIN — HEPARIN SODIUM 5000 UNITS: 5000 INJECTION, SOLUTION INTRAVENOUS; SUBCUTANEOUS at 22:41

## 2020-01-01 RX ADMIN — Medication 250 MG: at 20:13

## 2020-01-01 RX ADMIN — MEROPENEM 1 G: 1 INJECTION, POWDER, FOR SOLUTION INTRAVENOUS at 01:19

## 2020-01-01 RX ADMIN — MICAFUNGIN SODIUM 100 MG: 100 INJECTION, POWDER, LYOPHILIZED, FOR SOLUTION INTRAVENOUS at 22:28

## 2020-01-01 RX ADMIN — METOPROLOL TARTRATE 100 MG: 100 TABLET ORAL at 08:11

## 2020-01-01 RX ADMIN — METOPROLOL TARTRATE 100 MG: 100 TABLET ORAL at 09:01

## 2020-01-01 RX ADMIN — ASPIRIN 81 MG: 81 TABLET, COATED ORAL at 08:33

## 2020-01-01 RX ADMIN — AMPICILLIN SODIUM AND SULBACTAM SODIUM 3 G: 2; 1 INJECTION, POWDER, FOR SOLUTION INTRAMUSCULAR; INTRAVENOUS at 17:15

## 2020-01-01 RX ADMIN — METRONIDAZOLE 500 MG: 500 INJECTION, SOLUTION INTRAVENOUS at 06:16

## 2020-01-01 RX ADMIN — DULOXETINE HYDROCHLORIDE 60 MG: 60 CAPSULE, DELAYED RELEASE ORAL at 08:25

## 2020-01-01 RX ADMIN — AMLODIPINE BESYLATE 5 MG: 5 TABLET ORAL at 09:27

## 2020-01-01 RX ADMIN — SODIUM CHLORIDE, PRESERVATIVE FREE 10 ML: 5 INJECTION INTRAVENOUS at 22:38

## 2020-01-01 RX ADMIN — HEPARIN SODIUM 5000 UNITS: 5000 INJECTION INTRAVENOUS; SUBCUTANEOUS at 21:38

## 2020-01-01 RX ADMIN — MICAFUNGIN SODIUM 100 MG: 20 INJECTION, POWDER, LYOPHILIZED, FOR SOLUTION INTRAVENOUS at 11:23

## 2020-01-01 RX ADMIN — VANCOMYCIN HYDROCHLORIDE 250 MG: KIT at 12:32

## 2020-01-01 RX ADMIN — AMLODIPINE BESYLATE 5 MG: 5 TABLET ORAL at 08:55

## 2020-01-01 RX ADMIN — TERAZOSIN HYDROCHLORIDE 10 MG: 5 CAPSULE ORAL at 21:42

## 2020-01-01 RX ADMIN — MICONAZOLE NITRATE: 20 POWDER TOPICAL at 22:13

## 2020-01-01 RX ADMIN — DULOXETINE HYDROCHLORIDE 60 MG: 60 CAPSULE, DELAYED RELEASE ORAL at 08:19

## 2020-01-01 RX ADMIN — OXYCODONE 5 MG: 5 TABLET ORAL at 09:50

## 2020-01-01 RX ADMIN — LABETALOL 20 MG/4 ML (5 MG/ML) INTRAVENOUS SYRINGE 10 MG: at 19:59

## 2020-01-01 RX ADMIN — ASPIRIN 81 MG: 81 TABLET, COATED ORAL at 09:02

## 2020-01-01 RX ADMIN — VANCOMYCIN HYDROCHLORIDE 125 MG: KIT at 12:31

## 2020-01-01 RX ADMIN — ONDANSETRON 4 MG: 2 INJECTION INTRAMUSCULAR; INTRAVENOUS at 17:35

## 2020-01-01 RX ADMIN — OXYCODONE HYDROCHLORIDE AND ACETAMINOPHEN 1 TABLET: 10; 325 TABLET ORAL at 10:43

## 2020-01-01 RX ADMIN — METOPROLOL TARTRATE 100 MG: 100 TABLET ORAL at 10:16

## 2020-01-01 RX ADMIN — LORAZEPAM 1 MG: 2 INJECTION INTRAMUSCULAR; INTRAVENOUS at 14:49

## 2020-01-01 RX ADMIN — ATROPA BELLADONNA AND OPIUM 30 MG: 16.2; 3 SUPPOSITORY RECTAL at 16:03

## 2020-01-01 RX ADMIN — MEROPENEM 1 G: 1 INJECTION, POWDER, FOR SOLUTION INTRAVENOUS at 09:49

## 2020-01-01 RX ADMIN — METOPROLOL TARTRATE 100 MG: 100 TABLET ORAL at 20:37

## 2020-01-01 RX ADMIN — HYDRALAZINE HYDROCHLORIDE 10 MG: 10 TABLET ORAL at 22:30

## 2020-01-01 RX ADMIN — IPRATROPIUM BROMIDE AND ALBUTEROL SULFATE 3 ML: 2.5; .5 SOLUTION RESPIRATORY (INHALATION) at 15:53

## 2020-01-01 RX ADMIN — HEPARIN SODIUM 5000 UNITS: 5000 INJECTION INTRAVENOUS; SUBCUTANEOUS at 14:31

## 2020-01-01 RX ADMIN — DOCUSATE SODIUM 100 MG: 100 CAPSULE ORAL at 09:02

## 2020-01-01 RX ADMIN — INSULIN LISPRO 2 UNITS: 100 INJECTION, SOLUTION INTRAVENOUS; SUBCUTANEOUS at 18:51

## 2020-01-01 RX ADMIN — OXYCODONE HYDROCHLORIDE 15 MG: 15 TABLET ORAL at 20:15

## 2020-01-01 RX ADMIN — QUETIAPINE FUMARATE 12.5 MG: 25 TABLET ORAL at 20:44

## 2020-01-01 RX ADMIN — TERAZOSIN HYDROCHLORIDE 5 MG: 5 CAPSULE ORAL at 21:33

## 2020-01-01 RX ADMIN — HEPARIN SODIUM 5000 UNITS: 5000 INJECTION INTRAVENOUS; SUBCUTANEOUS at 21:03

## 2020-01-01 RX ADMIN — INSULIN LISPRO 2 UNITS: 100 INJECTION, SOLUTION INTRAVENOUS; SUBCUTANEOUS at 17:32

## 2020-01-01 RX ADMIN — AMPICILLIN SODIUM AND SULBACTAM SODIUM 3 G: 2; 1 INJECTION, POWDER, FOR SOLUTION INTRAMUSCULAR; INTRAVENOUS at 09:08

## 2020-01-01 RX ADMIN — INSULIN LISPRO 2 UNITS: 100 INJECTION, SOLUTION INTRAVENOUS; SUBCUTANEOUS at 13:08

## 2020-01-01 RX ADMIN — OXYCODONE HYDROCHLORIDE AND ACETAMINOPHEN 1 TABLET: 10; 325 TABLET ORAL at 22:31

## 2020-01-01 RX ADMIN — OXYCODONE HYDROCHLORIDE AND ACETAMINOPHEN 1 TABLET: 10; 325 TABLET ORAL at 14:53

## 2020-01-01 RX ADMIN — DEXTROSE MONOHYDRATE 25 G: 25 INJECTION, SOLUTION INTRAVENOUS at 05:54

## 2020-01-01 RX ADMIN — MELATONIN TAB 5 MG 5 MG: 5 TAB at 21:35

## 2020-01-01 RX ADMIN — INSULIN DETEMIR 10 UNITS: 100 INJECTION, SOLUTION SUBCUTANEOUS at 22:12

## 2020-01-01 RX ADMIN — HEPARIN SODIUM 5000 UNITS: 5000 INJECTION, SOLUTION INTRAVENOUS; SUBCUTANEOUS at 05:45

## 2020-01-01 RX ADMIN — HEPARIN SODIUM 5000 UNITS: 5000 INJECTION, SOLUTION INTRAVENOUS; SUBCUTANEOUS at 22:05

## 2020-01-01 RX ADMIN — VANCOMYCIN HYDROCHLORIDE 250 MG: KIT at 17:46

## 2020-01-01 RX ADMIN — METRONIDAZOLE 500 MG: 500 INJECTION, SOLUTION INTRAVENOUS at 13:57

## 2020-01-01 RX ADMIN — VANCOMYCIN HYDROCHLORIDE 250 MG: KIT at 00:36

## 2020-01-01 RX ADMIN — MICONAZOLE NITRATE: 20 POWDER TOPICAL at 08:55

## 2020-01-01 RX ADMIN — AMPICILLIN SODIUM AND SULBACTAM SODIUM 3 G: 2; 1 INJECTION, POWDER, FOR SOLUTION INTRAMUSCULAR; INTRAVENOUS at 14:58

## 2020-01-01 RX ADMIN — SODIUM CHLORIDE, PRESERVATIVE FREE 10 ML: 5 INJECTION INTRAVENOUS at 20:15

## 2020-01-01 RX ADMIN — MICONAZOLE NITRATE: 20 POWDER TOPICAL at 09:25

## 2020-01-01 RX ADMIN — METRONIDAZOLE 500 MG: 500 INJECTION, SOLUTION INTRAVENOUS at 04:08

## 2020-01-01 RX ADMIN — MORPHINE SULFATE 4 MG: 4 INJECTION, SOLUTION INTRAMUSCULAR; INTRAVENOUS at 04:19

## 2020-01-01 RX ADMIN — SODIUM CHLORIDE, PRESERVATIVE FREE 10 ML: 5 INJECTION INTRAVENOUS at 22:03

## 2020-01-01 RX ADMIN — METRONIDAZOLE 500 MG: 500 INJECTION, SOLUTION INTRAVENOUS at 13:04

## 2020-01-01 RX ADMIN — METOPROLOL TARTRATE 100 MG: 100 TABLET ORAL at 22:25

## 2020-01-01 RX ADMIN — METRONIDAZOLE 500 MG: 500 INJECTION, SOLUTION INTRAVENOUS at 08:37

## 2020-01-01 RX ADMIN — NYSTATIN 1 APPLICATION: 100000 CREAM TOPICAL at 12:04

## 2020-01-01 RX ADMIN — INSULIN LISPRO 3 UNITS: 100 INJECTION, SOLUTION INTRAVENOUS; SUBCUTANEOUS at 13:06

## 2020-01-01 RX ADMIN — INSULIN LISPRO 7 UNITS: 100 INJECTION, SOLUTION INTRAVENOUS; SUBCUTANEOUS at 08:29

## 2020-01-01 RX ADMIN — SODIUM CHLORIDE, PRESERVATIVE FREE 10 ML: 5 INJECTION INTRAVENOUS at 20:58

## 2020-01-01 RX ADMIN — HEPARIN SODIUM 5000 UNITS: 5000 INJECTION, SOLUTION INTRAVENOUS; SUBCUTANEOUS at 12:23

## 2020-01-01 RX ADMIN — VANCOMYCIN HYDROCHLORIDE 250 MG: KIT at 13:03

## 2020-01-01 RX ADMIN — SODIUM CHLORIDE, PRESERVATIVE FREE 10 ML: 5 INJECTION INTRAVENOUS at 09:04

## 2020-01-01 RX ADMIN — METOPROLOL TARTRATE 100 MG: 100 TABLET ORAL at 22:41

## 2020-01-01 RX ADMIN — METOPROLOL TARTRATE 100 MG: 100 TABLET ORAL at 10:10

## 2020-01-01 RX ADMIN — DULOXETINE HYDROCHLORIDE 60 MG: 60 CAPSULE, DELAYED RELEASE ORAL at 09:57

## 2020-01-01 RX ADMIN — TERAZOSIN HYDROCHLORIDE 10 MG: 5 CAPSULE ORAL at 22:40

## 2020-01-01 RX ADMIN — TERAZOSIN HYDROCHLORIDE 10 MG: 5 CAPSULE ORAL at 21:07

## 2020-01-01 RX ADMIN — Medication 250 MG: at 20:20

## 2020-01-01 RX ADMIN — VANCOMYCIN HYDROCHLORIDE 250 MG: KIT at 14:29

## 2020-01-01 RX ADMIN — QUETIAPINE FUMARATE 12.5 MG: 25 TABLET ORAL at 20:09

## 2020-01-01 RX ADMIN — TERAZOSIN HYDROCHLORIDE 5 MG: 5 CAPSULE ORAL at 21:35

## 2020-01-01 RX ADMIN — QUETIAPINE FUMARATE 12.5 MG: 25 TABLET ORAL at 20:02

## 2020-01-01 RX ADMIN — OXYCODONE 5 MG: 5 TABLET ORAL at 05:39

## 2020-01-01 RX ADMIN — Medication 250 MG: at 21:04

## 2020-01-01 RX ADMIN — METOPROLOL TARTRATE 100 MG: 100 TABLET ORAL at 20:50

## 2020-01-01 RX ADMIN — HYDRALAZINE HYDROCHLORIDE 10 MG: 10 TABLET ORAL at 13:14

## 2020-01-01 RX ADMIN — NYSTATIN 1 APPLICATION: 100000 CREAM TOPICAL at 20:57

## 2020-01-01 RX ADMIN — INSULIN LISPRO 2 UNITS: 100 INJECTION, SOLUTION INTRAVENOUS; SUBCUTANEOUS at 17:00

## 2020-01-01 RX ADMIN — AMPICILLIN SODIUM AND SULBACTAM SODIUM 3 G: 2; 1 INJECTION, POWDER, FOR SOLUTION INTRAMUSCULAR; INTRAVENOUS at 13:02

## 2020-01-01 RX ADMIN — TERAZOSIN HYDROCHLORIDE 10 MG: 5 CAPSULE ORAL at 21:25

## 2020-01-01 RX ADMIN — OXYCODONE HYDROCHLORIDE AND ACETAMINOPHEN 1 TABLET: 5; 325 TABLET ORAL at 23:49

## 2020-01-01 RX ADMIN — MEROPENEM 1 G: 1 INJECTION, POWDER, FOR SOLUTION INTRAVENOUS at 10:15

## 2020-01-01 RX ADMIN — ASPIRIN 81 MG: 81 TABLET, COATED ORAL at 08:11

## 2020-01-01 RX ADMIN — INSULIN LISPRO 8 UNITS: 100 INJECTION, SOLUTION INTRAVENOUS; SUBCUTANEOUS at 14:53

## 2020-01-01 RX ADMIN — INSULIN LISPRO 4 UNITS: 100 INJECTION, SOLUTION INTRAVENOUS; SUBCUTANEOUS at 13:05

## 2020-01-01 RX ADMIN — HYDRALAZINE HYDROCHLORIDE 10 MG: 10 TABLET ORAL at 21:23

## 2020-01-01 RX ADMIN — SPIRONOLACTONE 25 MG: 25 TABLET ORAL at 09:12

## 2020-01-01 RX ADMIN — Medication 250 MG: at 22:34

## 2020-01-01 RX ADMIN — CEFTRIAXONE 2 G: 2 INJECTION, POWDER, FOR SOLUTION INTRAMUSCULAR; INTRAVENOUS at 12:46

## 2020-01-01 RX ADMIN — ASPIRIN 81 MG: 81 TABLET, COATED ORAL at 08:45

## 2020-01-01 RX ADMIN — SPIRONOLACTONE 25 MG: 25 TABLET ORAL at 08:51

## 2020-01-01 RX ADMIN — METOPROLOL TARTRATE 100 MG: 100 TABLET ORAL at 20:38

## 2020-01-01 RX ADMIN — HYDRALAZINE HYDROCHLORIDE 10 MG: 10 TABLET ORAL at 06:56

## 2020-01-01 RX ADMIN — METRONIDAZOLE 500 MG: 500 INJECTION, SOLUTION INTRAVENOUS at 15:01

## 2020-01-01 RX ADMIN — HEPARIN SODIUM 5000 UNITS: 5000 INJECTION, SOLUTION INTRAVENOUS; SUBCUTANEOUS at 13:57

## 2020-01-01 RX ADMIN — TERAZOSIN HYDROCHLORIDE 5 MG: 5 CAPSULE ORAL at 21:02

## 2020-01-01 RX ADMIN — OXYCODONE HYDROCHLORIDE AND ACETAMINOPHEN 1 TABLET: 10; 325 TABLET ORAL at 20:57

## 2020-01-01 RX ADMIN — TAZOBACTAM SODIUM AND PIPERACILLIN SODIUM 3.38 G: 375; 3 INJECTION, SOLUTION INTRAVENOUS at 13:26

## 2020-01-01 RX ADMIN — METOPROLOL TARTRATE 100 MG: 100 TABLET ORAL at 20:30

## 2020-01-01 RX ADMIN — SODIUM CHLORIDE, PRESERVATIVE FREE 10 ML: 5 INJECTION INTRAVENOUS at 08:44

## 2020-01-01 RX ADMIN — TAZOBACTAM SODIUM AND PIPERACILLIN SODIUM 3.38 G: 375; 3 INJECTION, SOLUTION INTRAVENOUS at 20:14

## 2020-01-01 RX ADMIN — MICAFUNGIN SODIUM 100 MG: 20 INJECTION, POWDER, LYOPHILIZED, FOR SOLUTION INTRAVENOUS at 14:30

## 2020-01-01 RX ADMIN — Medication 250 MG: at 20:08

## 2020-01-01 RX ADMIN — AMLODIPINE BESYLATE 10 MG: 10 TABLET ORAL at 08:36

## 2020-01-01 RX ADMIN — VANCOMYCIN HYDROCHLORIDE 250 MG: KIT at 05:32

## 2020-01-01 RX ADMIN — INSULIN LISPRO 2 UNITS: 100 INJECTION, SOLUTION INTRAVENOUS; SUBCUTANEOUS at 10:16

## 2020-01-01 RX ADMIN — VANCOMYCIN HYDROCHLORIDE 125 MG: KIT at 17:56

## 2020-01-01 RX ADMIN — MEROPENEM 1 G: 1 INJECTION, POWDER, FOR SOLUTION INTRAVENOUS at 23:29

## 2020-01-01 RX ADMIN — Medication 250 MG: at 08:34

## 2020-01-01 RX ADMIN — AMLODIPINE BESYLATE 5 MG: 5 TABLET ORAL at 22:30

## 2020-01-01 RX ADMIN — ASPIRIN 81 MG: 81 TABLET, FILM COATED ORAL at 09:49

## 2020-01-01 RX ADMIN — QUETIAPINE FUMARATE 12.5 MG: 25 TABLET ORAL at 21:16

## 2020-01-01 RX ADMIN — OXYCODONE 5 MG: 5 TABLET ORAL at 13:52

## 2020-01-01 RX ADMIN — OXYCODONE 5 MG: 5 TABLET ORAL at 14:29

## 2020-01-01 RX ADMIN — VANCOMYCIN HYDROCHLORIDE 125 MG: KIT at 23:34

## 2020-01-01 RX ADMIN — ONDANSETRON 4 MG: 2 INJECTION INTRAMUSCULAR; INTRAVENOUS at 18:32

## 2020-01-01 RX ADMIN — VANCOMYCIN HYDROCHLORIDE 250 MG: KIT at 18:17

## 2020-01-01 RX ADMIN — VANCOMYCIN HYDROCHLORIDE 125 MG: KIT at 06:30

## 2020-01-01 RX ADMIN — MEROPENEM 1 G: 1 INJECTION, POWDER, FOR SOLUTION INTRAVENOUS at 01:16

## 2020-01-01 RX ADMIN — OXYCODONE 5 MG: 5 TABLET ORAL at 09:32

## 2020-01-01 RX ADMIN — FUROSEMIDE 40 MG: 10 INJECTION, SOLUTION INTRAMUSCULAR; INTRAVENOUS at 10:16

## 2020-01-01 RX ADMIN — QUETIAPINE FUMARATE 12.5 MG: 25 TABLET ORAL at 21:00

## 2020-01-01 RX ADMIN — MICAFUNGIN SODIUM 100 MG: 20 INJECTION, POWDER, LYOPHILIZED, FOR SOLUTION INTRAVENOUS at 13:03

## 2020-01-01 RX ADMIN — Medication 250 MG: at 09:57

## 2020-01-01 RX ADMIN — GABAPENTIN 100 MG: 100 CAPSULE ORAL at 08:11

## 2020-01-01 RX ADMIN — MICAFUNGIN SODIUM 100 MG: 100 INJECTION, POWDER, LYOPHILIZED, FOR SOLUTION INTRAVENOUS at 20:59

## 2020-01-01 RX ADMIN — Medication 250 MG: at 08:13

## 2020-01-01 RX ADMIN — AMPICILLIN SODIUM AND SULBACTAM SODIUM 3 G: 2; 1 INJECTION, POWDER, FOR SOLUTION INTRAMUSCULAR; INTRAVENOUS at 02:56

## 2020-01-01 RX ADMIN — HEPARIN SODIUM 5000 UNITS: 5000 INJECTION, SOLUTION INTRAVENOUS; SUBCUTANEOUS at 21:02

## 2020-01-01 RX ADMIN — VANCOMYCIN HYDROCHLORIDE 125 MG: KIT at 13:03

## 2020-01-01 RX ADMIN — BUMETANIDE 1 MG: 0.25 INJECTION INTRAMUSCULAR; INTRAVENOUS at 09:49

## 2020-01-01 RX ADMIN — INSULIN LISPRO 3 UNITS: 100 INJECTION, SOLUTION INTRAVENOUS; SUBCUTANEOUS at 08:45

## 2020-01-01 RX ADMIN — METRONIDAZOLE 500 MG: 500 INJECTION, SOLUTION INTRAVENOUS at 06:11

## 2020-01-01 RX ADMIN — AMPICILLIN SODIUM AND SULBACTAM SODIUM 3 G: 2; 1 INJECTION, POWDER, FOR SOLUTION INTRAMUSCULAR; INTRAVENOUS at 23:12

## 2020-01-01 RX ADMIN — INSULIN LISPRO 3 UNITS: 100 INJECTION, SOLUTION INTRAVENOUS; SUBCUTANEOUS at 10:30

## 2020-01-01 RX ADMIN — ASPIRIN 81 MG: 81 TABLET, COATED ORAL at 09:14

## 2020-01-01 RX ADMIN — METRONIDAZOLE 500 MG: 500 INJECTION, SOLUTION INTRAVENOUS at 17:55

## 2020-01-01 RX ADMIN — SODIUM CHLORIDE, PRESERVATIVE FREE 10 ML: 5 INJECTION INTRAVENOUS at 20:56

## 2020-01-01 RX ADMIN — HYDRALAZINE HYDROCHLORIDE 10 MG: 10 TABLET ORAL at 21:56

## 2020-01-01 RX ADMIN — METOPROLOL TARTRATE 100 MG: 100 TABLET ORAL at 08:44

## 2020-01-01 RX ADMIN — FENTANYL 1 PATCH: 12.5 PATCH TRANSDERMAL at 01:50

## 2020-01-01 RX ADMIN — INSULIN DETEMIR 10 UNITS: 100 INJECTION, SOLUTION SUBCUTANEOUS at 22:38

## 2020-01-01 RX ADMIN — METOPROLOL TARTRATE 100 MG: 100 TABLET ORAL at 21:16

## 2020-01-01 RX ADMIN — INSULIN DETEMIR 10 UNITS: 100 INJECTION, SOLUTION SUBCUTANEOUS at 21:16

## 2020-01-01 RX ADMIN — HYDRALAZINE HYDROCHLORIDE 10 MG: 10 TABLET ORAL at 21:11

## 2020-01-01 RX ADMIN — OXYCODONE 5 MG: 5 TABLET ORAL at 23:06

## 2020-01-01 RX ADMIN — HYDRALAZINE HYDROCHLORIDE 50 MG: 50 TABLET, FILM COATED ORAL at 22:43

## 2020-01-01 RX ADMIN — METOPROLOL TARTRATE 100 MG: 100 TABLET ORAL at 08:07

## 2020-01-01 RX ADMIN — METOPROLOL TARTRATE 100 MG: 100 TABLET ORAL at 08:51

## 2020-01-01 RX ADMIN — FUROSEMIDE 60 MG: 10 INJECTION, SOLUTION INTRAMUSCULAR; INTRAVENOUS at 10:45

## 2020-01-01 RX ADMIN — ASPIRIN 81 MG: 81 TABLET, COATED ORAL at 09:52

## 2020-01-01 RX ADMIN — DULOXETINE HYDROCHLORIDE 60 MG: 60 CAPSULE, DELAYED RELEASE ORAL at 10:44

## 2020-01-01 RX ADMIN — QUETIAPINE FUMARATE 12.5 MG: 25 TABLET ORAL at 23:33

## 2020-01-01 RX ADMIN — MEROPENEM 1 G: 1 INJECTION, POWDER, FOR SOLUTION INTRAVENOUS at 01:07

## 2020-01-01 RX ADMIN — INSULIN DETEMIR 10 UNITS: 100 INJECTION, SOLUTION SUBCUTANEOUS at 21:04

## 2020-01-01 RX ADMIN — INSULIN LISPRO 3 UNITS: 100 INJECTION, SOLUTION INTRAVENOUS; SUBCUTANEOUS at 17:29

## 2020-01-01 RX ADMIN — DAPTOMYCIN 550 MG: 500 INJECTION, POWDER, LYOPHILIZED, FOR SOLUTION INTRAVENOUS at 11:14

## 2020-01-01 RX ADMIN — DULOXETINE HYDROCHLORIDE 60 MG: 60 CAPSULE, DELAYED RELEASE ORAL at 09:18

## 2020-01-01 RX ADMIN — VANCOMYCIN HYDROCHLORIDE 250 MG: KIT at 17:15

## 2020-01-01 RX ADMIN — INSULIN LISPRO 2 UNITS: 100 INJECTION, SOLUTION INTRAVENOUS; SUBCUTANEOUS at 12:25

## 2020-01-01 RX ADMIN — METOPROLOL TARTRATE 100 MG: 100 TABLET ORAL at 09:52

## 2020-01-01 RX ADMIN — MICAFUNGIN SODIUM 100 MG: 100 INJECTION, POWDER, LYOPHILIZED, FOR SOLUTION INTRAVENOUS at 21:30

## 2020-01-01 RX ADMIN — DOCUSATE SODIUM 100 MG: 100 CAPSULE, LIQUID FILLED ORAL at 12:08

## 2020-01-01 RX ADMIN — OXYCODONE HYDROCHLORIDE AND ACETAMINOPHEN 1 TABLET: 10; 325 TABLET ORAL at 20:42

## 2020-01-01 RX ADMIN — ONDANSETRON 4 MG: 2 INJECTION INTRAMUSCULAR; INTRAVENOUS at 16:06

## 2020-01-01 RX ADMIN — OXYCODONE HYDROCHLORIDE 15 MG: 15 TABLET ORAL at 20:49

## 2020-01-01 RX ADMIN — ONDANSETRON 4 MG: 2 INJECTION INTRAMUSCULAR; INTRAVENOUS at 11:26

## 2020-01-01 RX ADMIN — METRONIDAZOLE 500 MG: 500 INJECTION, SOLUTION INTRAVENOUS at 06:13

## 2020-01-01 RX ADMIN — OXYCODONE HYDROCHLORIDE AND ACETAMINOPHEN 1 TABLET: 10; 325 TABLET ORAL at 17:57

## 2020-01-01 RX ADMIN — OXYCODONE HYDROCHLORIDE AND ACETAMINOPHEN 1 TABLET: 10; 325 TABLET ORAL at 10:16

## 2020-01-01 RX ADMIN — HEPARIN SODIUM 5000 UNITS: 5000 INJECTION, SOLUTION INTRAVENOUS; SUBCUTANEOUS at 21:44

## 2020-01-01 RX ADMIN — POTASSIUM CHLORIDE 40 MEQ: 10 CAPSULE, COATED, EXTENDED RELEASE ORAL at 11:05

## 2020-01-01 RX ADMIN — AMPICILLIN SODIUM AND SULBACTAM SODIUM 3 G: 2; 1 INJECTION, POWDER, FOR SOLUTION INTRAMUSCULAR; INTRAVENOUS at 20:35

## 2020-01-01 RX ADMIN — METOPROLOL TARTRATE 100 MG: 100 TABLET, FILM COATED ORAL at 08:35

## 2020-01-01 RX ADMIN — METOPROLOL TARTRATE 100 MG: 100 TABLET ORAL at 20:19

## 2020-01-01 RX ADMIN — TERAZOSIN HYDROCHLORIDE 10 MG: 5 CAPSULE ORAL at 23:10

## 2020-01-01 RX ADMIN — SODIUM CHLORIDE, PRESERVATIVE FREE 10 ML: 5 INJECTION INTRAVENOUS at 21:25

## 2020-01-01 RX ADMIN — OXYCODONE HYDROCHLORIDE AND ACETAMINOPHEN 1 TABLET: 10; 325 TABLET ORAL at 22:07

## 2020-01-01 RX ADMIN — INSULIN LISPRO 2 UNITS: 100 INJECTION, SOLUTION INTRAVENOUS; SUBCUTANEOUS at 10:11

## 2020-01-01 RX ADMIN — AMPICILLIN SODIUM AND SULBACTAM SODIUM 3 G: 2; 1 INJECTION, POWDER, FOR SOLUTION INTRAMUSCULAR; INTRAVENOUS at 09:16

## 2020-01-01 RX ADMIN — MORPHINE SULFATE 4 MG: 4 INJECTION, SOLUTION INTRAMUSCULAR; INTRAVENOUS at 08:51

## 2020-01-01 RX ADMIN — INSULIN LISPRO 2 UNITS: 100 INJECTION, SOLUTION INTRAVENOUS; SUBCUTANEOUS at 12:06

## 2020-01-01 RX ADMIN — CLINDAMYCIN PHOSPHATE 600 MG: 600 INJECTION, SOLUTION INTRAVENOUS at 08:16

## 2020-01-01 RX ADMIN — BUMETANIDE 1 MG: 0.25 INJECTION INTRAMUSCULAR; INTRAVENOUS at 22:35

## 2020-01-01 RX ADMIN — METOPROLOL TARTRATE 50 MG: 50 TABLET, FILM COATED ORAL at 09:27

## 2020-01-01 RX ADMIN — Medication 250 MG: at 08:42

## 2020-01-01 RX ADMIN — ONDANSETRON 4 MG: 2 INJECTION INTRAMUSCULAR; INTRAVENOUS at 20:38

## 2020-01-01 RX ADMIN — INSULIN LISPRO 2 UNITS: 100 INJECTION, SOLUTION INTRAVENOUS; SUBCUTANEOUS at 12:15

## 2020-01-01 RX ADMIN — FUROSEMIDE 40 MG: 10 INJECTION, SOLUTION INTRAMUSCULAR; INTRAVENOUS at 20:50

## 2020-01-01 RX ADMIN — INSULIN LISPRO 5 UNITS: 100 INJECTION, SOLUTION INTRAVENOUS; SUBCUTANEOUS at 12:34

## 2020-01-01 RX ADMIN — FUROSEMIDE 40 MG: 40 TABLET ORAL at 19:43

## 2020-01-01 RX ADMIN — METOPROLOL TARTRATE 100 MG: 100 TABLET, FILM COATED ORAL at 08:55

## 2020-01-01 RX ADMIN — LIDOCAINE 1 PATCH: 50 PATCH CUTANEOUS at 08:42

## 2020-01-01 RX ADMIN — GABAPENTIN 300 MG: 300 CAPSULE ORAL at 21:34

## 2020-01-01 RX ADMIN — INSULIN DETEMIR 5 UNITS: 100 INJECTION, SOLUTION SUBCUTANEOUS at 09:31

## 2020-01-01 RX ADMIN — SODIUM CHLORIDE, PRESERVATIVE FREE 10 ML: 5 INJECTION INTRAVENOUS at 10:29

## 2020-01-01 RX ADMIN — INSULIN LISPRO 3 UNITS: 100 INJECTION, SOLUTION INTRAVENOUS; SUBCUTANEOUS at 09:50

## 2020-01-01 RX ADMIN — NYSTATIN: 100000 POWDER TOPICAL at 20:02

## 2020-01-01 RX ADMIN — MAGNESIUM SULFATE HEPTAHYDRATE 2 G: 40 INJECTION, SOLUTION INTRAVENOUS at 14:32

## 2020-01-01 RX ADMIN — HEPARIN SODIUM 5000 UNITS: 5000 INJECTION, SOLUTION INTRAVENOUS; SUBCUTANEOUS at 20:42

## 2020-01-01 RX ADMIN — LORAZEPAM 1 MG: 2 INJECTION INTRAMUSCULAR; INTRAVENOUS at 01:00

## 2020-01-01 RX ADMIN — VANCOMYCIN HYDROCHLORIDE 1500 MG: 10 INJECTION, POWDER, LYOPHILIZED, FOR SOLUTION INTRAVENOUS at 12:14

## 2020-01-01 RX ADMIN — TERAZOSIN HYDROCHLORIDE 10 MG: 5 CAPSULE ORAL at 22:32

## 2020-01-01 RX ADMIN — DAPTOMYCIN 550 MG: 500 INJECTION, POWDER, LYOPHILIZED, FOR SOLUTION INTRAVENOUS at 10:41

## 2020-01-01 RX ADMIN — TERAZOSIN HYDROCHLORIDE 5 MG: 5 CAPSULE ORAL at 21:10

## 2020-01-01 RX ADMIN — METOPROLOL TARTRATE 100 MG: 100 TABLET ORAL at 09:30

## 2020-01-01 RX ADMIN — QUETIAPINE FUMARATE 25 MG: 25 TABLET ORAL at 20:37

## 2020-01-01 RX ADMIN — METOPROLOL TARTRATE 100 MG: 100 TABLET ORAL at 01:22

## 2020-01-01 RX ADMIN — Medication 250 MG: at 20:30

## 2020-01-01 RX ADMIN — INSULIN LISPRO 3 UNITS: 100 INJECTION, SOLUTION INTRAVENOUS; SUBCUTANEOUS at 17:01

## 2020-01-01 RX ADMIN — ONDANSETRON 4 MG: 2 INJECTION INTRAMUSCULAR; INTRAVENOUS at 12:12

## 2020-01-01 RX ADMIN — QUETIAPINE FUMARATE 12.5 MG: 25 TABLET ORAL at 21:11

## 2020-01-01 RX ADMIN — IPRATROPIUM BROMIDE AND ALBUTEROL SULFATE 3 ML: 2.5; .5 SOLUTION RESPIRATORY (INHALATION) at 07:19

## 2020-01-01 RX ADMIN — DULOXETINE HYDROCHLORIDE 60 MG: 60 CAPSULE, DELAYED RELEASE ORAL at 08:36

## 2020-01-01 RX ADMIN — FUROSEMIDE 40 MG: 10 INJECTION, SOLUTION INTRAVENOUS at 23:22

## 2020-01-01 RX ADMIN — VANCOMYCIN HYDROCHLORIDE 1250 MG: 100 INJECTION, POWDER, LYOPHILIZED, FOR SOLUTION INTRAVENOUS at 00:02

## 2020-01-01 RX ADMIN — HEPARIN SODIUM 5000 UNITS: 5000 INJECTION, SOLUTION INTRAVENOUS; SUBCUTANEOUS at 06:18

## 2020-01-01 RX ADMIN — METOPROLOL TARTRATE 100 MG: 100 TABLET ORAL at 20:44

## 2020-01-01 RX ADMIN — MELATONIN TAB 5 MG 5 MG: 5 TAB at 21:16

## 2020-01-01 RX ADMIN — SODIUM CHLORIDE 500 ML: 9 INJECTION, SOLUTION INTRAVENOUS at 00:59

## 2020-01-01 RX ADMIN — ASPIRIN 81 MG: 81 TABLET, COATED ORAL at 09:59

## 2020-01-01 RX ADMIN — VANCOMYCIN HYDROCHLORIDE 125 MG: KIT at 06:11

## 2020-01-01 RX ADMIN — HEPARIN SODIUM 5000 UNITS: 5000 INJECTION, SOLUTION INTRAVENOUS; SUBCUTANEOUS at 13:04

## 2020-01-01 RX ADMIN — OXYCODONE HYDROCHLORIDE AND ACETAMINOPHEN 1 TABLET: 10; 325 TABLET ORAL at 08:55

## 2020-01-01 RX ADMIN — MEROPENEM 1 G: 1 INJECTION, POWDER, FOR SOLUTION INTRAVENOUS at 09:55

## 2020-01-01 RX ADMIN — FUROSEMIDE 20 MG: 10 INJECTION, SOLUTION INTRAMUSCULAR; INTRAVENOUS at 10:59

## 2020-01-01 RX ADMIN — SODIUM CHLORIDE, PRESERVATIVE FREE 10 ML: 5 INJECTION INTRAVENOUS at 10:05

## 2020-01-01 RX ADMIN — QUETIAPINE FUMARATE 12.5 MG: 25 TABLET ORAL at 23:14

## 2020-01-01 RX ADMIN — MEROPENEM 1 G: 1 INJECTION, POWDER, FOR SOLUTION INTRAVENOUS at 09:27

## 2020-01-01 RX ADMIN — INSULIN LISPRO 3 UNITS: 100 INJECTION, SOLUTION INTRAVENOUS; SUBCUTANEOUS at 17:35

## 2020-01-01 RX ADMIN — DOCUSATE SODIUM 50MG AND SENNOSIDES 8.6MG 2 TABLET: 8.6; 5 TABLET, FILM COATED ORAL at 21:58

## 2020-01-01 RX ADMIN — MICAFUNGIN SODIUM 100 MG: 100 INJECTION, POWDER, LYOPHILIZED, FOR SOLUTION INTRAVENOUS at 22:37

## 2020-01-01 RX ADMIN — FUROSEMIDE 60 MG: 10 INJECTION, SOLUTION INTRAMUSCULAR; INTRAVENOUS at 21:49

## 2020-01-01 RX ADMIN — ONDANSETRON 4 MG: 2 INJECTION INTRAMUSCULAR; INTRAVENOUS at 12:33

## 2020-01-01 RX ADMIN — VANCOMYCIN HYDROCHLORIDE 250 MG: KIT at 23:03

## 2020-01-01 RX ADMIN — METRONIDAZOLE 500 MG: 500 INJECTION, SOLUTION INTRAVENOUS at 16:19

## 2020-01-01 RX ADMIN — MAGNESIUM SULFATE HEPTAHYDRATE 2 G: 40 INJECTION, SOLUTION INTRAVENOUS at 18:13

## 2020-01-01 RX ADMIN — OXYCODONE HYDROCHLORIDE AND ACETAMINOPHEN 1 TABLET: 5; 325 TABLET ORAL at 22:30

## 2020-01-01 RX ADMIN — GABAPENTIN 300 MG: 300 CAPSULE ORAL at 23:33

## 2020-01-01 RX ADMIN — SPIRONOLACTONE 25 MG: 25 TABLET ORAL at 12:57

## 2020-01-01 RX ADMIN — HEPARIN SODIUM 5000 UNITS: 5000 INJECTION, SOLUTION INTRAVENOUS; SUBCUTANEOUS at 23:09

## 2020-01-01 RX ADMIN — ASPIRIN 81 MG: 81 TABLET, FILM COATED ORAL at 08:58

## 2020-01-01 RX ADMIN — SODIUM CHLORIDE, PRESERVATIVE FREE 10 ML: 5 INJECTION INTRAVENOUS at 10:44

## 2020-01-01 RX ADMIN — MICONAZOLE NITRATE: 2 CREAM TOPICAL at 20:18

## 2020-01-01 RX ADMIN — INSULIN LISPRO 3 UNITS: 100 INJECTION, SOLUTION INTRAVENOUS; SUBCUTANEOUS at 11:30

## 2020-01-01 RX ADMIN — TAZOBACTAM SODIUM AND PIPERACILLIN SODIUM 3.38 G: 375; 3 INJECTION, SOLUTION INTRAVENOUS at 20:12

## 2020-01-01 RX ADMIN — AMPICILLIN SODIUM AND SULBACTAM SODIUM 3 G: 2; 1 INJECTION, POWDER, FOR SOLUTION INTRAMUSCULAR; INTRAVENOUS at 20:36

## 2020-01-01 RX ADMIN — INSULIN LISPRO 2 UNITS: 100 INJECTION, SOLUTION INTRAVENOUS; SUBCUTANEOUS at 17:25

## 2020-01-01 RX ADMIN — INSULIN DETEMIR 5 UNITS: 100 INJECTION, SOLUTION SUBCUTANEOUS at 10:04

## 2020-01-01 RX ADMIN — FUROSEMIDE 40 MG: 10 INJECTION, SOLUTION INTRAMUSCULAR; INTRAVENOUS at 03:15

## 2020-01-01 RX ADMIN — MICAFUNGIN SODIUM 100 MG: 100 INJECTION, POWDER, LYOPHILIZED, FOR SOLUTION INTRAVENOUS at 21:05

## 2020-01-01 RX ADMIN — CEFTRIAXONE SODIUM 2 G: 2 INJECTION, POWDER, FOR SOLUTION INTRAMUSCULAR; INTRAVENOUS at 05:05

## 2020-01-01 RX ADMIN — ASPIRIN 81 MG: 81 TABLET, COATED ORAL at 09:58

## 2020-01-01 RX ADMIN — MICAFUNGIN SODIUM 100 MG: 100 INJECTION, POWDER, LYOPHILIZED, FOR SOLUTION INTRAVENOUS at 21:42

## 2020-01-01 RX ADMIN — VANCOMYCIN HYDROCHLORIDE 250 MG: KIT at 09:17

## 2020-01-01 RX ADMIN — METOPROLOL TARTRATE 100 MG: 100 TABLET ORAL at 09:28

## 2020-01-01 RX ADMIN — HEPARIN SODIUM 5000 UNITS: 5000 INJECTION, SOLUTION INTRAVENOUS; SUBCUTANEOUS at 06:30

## 2020-01-01 RX ADMIN — INSULIN LISPRO 3 UNITS: 100 INJECTION, SOLUTION INTRAVENOUS; SUBCUTANEOUS at 13:03

## 2020-01-01 RX ADMIN — INSULIN LISPRO 2 UNITS: 100 INJECTION, SOLUTION INTRAVENOUS; SUBCUTANEOUS at 08:56

## 2020-01-01 RX ADMIN — OXYCODONE 5 MG: 5 TABLET ORAL at 00:45

## 2020-01-01 RX ADMIN — INSULIN LISPRO 2 UNITS: 100 INJECTION, SOLUTION INTRAVENOUS; SUBCUTANEOUS at 08:19

## 2020-01-01 RX ADMIN — MEROPENEM 1 G: 1 INJECTION, POWDER, FOR SOLUTION INTRAVENOUS at 16:44

## 2020-01-01 RX ADMIN — VANCOMYCIN HYDROCHLORIDE 125 MG: KIT at 00:42

## 2020-01-01 RX ADMIN — ENOXAPARIN SODIUM 40 MG: 40 INJECTION SUBCUTANEOUS at 09:56

## 2020-01-01 RX ADMIN — MAGNESIUM SULFATE 2 G: 2 INJECTION INTRAVENOUS at 10:41

## 2020-01-01 RX ADMIN — PHENYLEPHRINE HYDROCHLORIDE 80 MCG: 10 INJECTION INTRAVENOUS at 12:30

## 2020-01-01 RX ADMIN — INSULIN LISPRO 2 UNITS: 100 INJECTION, SOLUTION INTRAVENOUS; SUBCUTANEOUS at 12:37

## 2020-01-01 RX ADMIN — OXYCODONE HYDROCHLORIDE AND ACETAMINOPHEN 1 TABLET: 10; 325 TABLET ORAL at 20:21

## 2020-01-01 RX ADMIN — SODIUM CHLORIDE 1000 ML: 9 INJECTION, SOLUTION INTRAVENOUS at 20:59

## 2020-01-01 RX ADMIN — SODIUM CHLORIDE, PRESERVATIVE FREE 10 ML: 5 INJECTION INTRAVENOUS at 09:24

## 2020-01-01 RX ADMIN — METRONIDAZOLE 500 MG: 500 INJECTION, SOLUTION INTRAVENOUS at 12:22

## 2020-01-01 RX ADMIN — VANCOMYCIN HYDROCHLORIDE 2500 MG: 10 INJECTION, POWDER, LYOPHILIZED, FOR SOLUTION INTRAVENOUS at 21:45

## 2020-01-01 RX ADMIN — AMLODIPINE BESYLATE 10 MG: 10 TABLET ORAL at 09:33

## 2020-01-01 RX ADMIN — OXYCODONE HYDROCHLORIDE AND ACETAMINOPHEN 1 TABLET: 10; 325 TABLET ORAL at 09:19

## 2020-01-01 RX ADMIN — MELATONIN TAB 5 MG 5 MG: 5 TAB at 20:42

## 2020-01-01 RX ADMIN — FENTANYL 1 PATCH: 12.5 PATCH TRANSDERMAL at 20:43

## 2020-01-01 RX ADMIN — SODIUM CHLORIDE, PRESERVATIVE FREE 10 ML: 5 INJECTION INTRAVENOUS at 21:16

## 2020-01-01 RX ADMIN — VANCOMYCIN HYDROCHLORIDE 250 MG: KIT at 00:49

## 2020-01-01 RX ADMIN — TAZOBACTAM SODIUM AND PIPERACILLIN SODIUM 3.38 G: 375; 3 INJECTION, SOLUTION INTRAVENOUS at 11:14

## 2020-01-01 RX ADMIN — OXYCODONE 5 MG: 5 TABLET ORAL at 06:15

## 2020-01-01 RX ADMIN — AMPICILLIN SODIUM AND SULBACTAM SODIUM 3 G: 2; 1 INJECTION, POWDER, FOR SOLUTION INTRAMUSCULAR; INTRAVENOUS at 08:59

## 2020-01-01 RX ADMIN — SODIUM CHLORIDE, PRESERVATIVE FREE 10 ML: 5 INJECTION INTRAVENOUS at 22:44

## 2020-01-01 RX ADMIN — SODIUM CHLORIDE, PRESERVATIVE FREE 10 ML: 5 INJECTION INTRAVENOUS at 21:23

## 2020-01-01 RX ADMIN — Medication 250 MG: at 20:51

## 2020-01-01 RX ADMIN — DOCUSATE SODIUM 50MG AND SENNOSIDES 8.6MG 2 TABLET: 8.6; 5 TABLET, FILM COATED ORAL at 08:33

## 2020-01-01 RX ADMIN — METOPROLOL TARTRATE 100 MG: 100 TABLET ORAL at 20:02

## 2020-01-01 RX ADMIN — SODIUM CHLORIDE 50 ML/HR: 9 INJECTION, SOLUTION INTRAVENOUS at 10:55

## 2020-01-01 RX ADMIN — VANCOMYCIN HYDROCHLORIDE 250 MG: KIT at 18:19

## 2020-01-01 RX ADMIN — MICONAZOLE NITRATE 1 APPLICATION: 2 CREAM TOPICAL at 09:25

## 2020-01-01 RX ADMIN — AMLODIPINE BESYLATE 10 MG: 10 TABLET ORAL at 08:57

## 2020-01-01 RX ADMIN — TERAZOSIN HYDROCHLORIDE ANHYDROUS 10 MG: 5 CAPSULE ORAL at 20:20

## 2020-01-01 RX ADMIN — AMLODIPINE BESYLATE 10 MG: 10 TABLET ORAL at 08:45

## 2020-01-01 RX ADMIN — SODIUM CHLORIDE, PRESERVATIVE FREE 10 ML: 5 INJECTION INTRAVENOUS at 23:07

## 2020-01-01 RX ADMIN — GABAPENTIN 300 MG: 300 CAPSULE ORAL at 20:40

## 2020-01-01 RX ADMIN — INSULIN LISPRO 2 UNITS: 100 INJECTION, SOLUTION INTRAVENOUS; SUBCUTANEOUS at 08:34

## 2020-01-01 RX ADMIN — GABAPENTIN 100 MG: 100 CAPSULE ORAL at 08:33

## 2020-01-01 RX ADMIN — INSULIN DETEMIR 10 UNITS: 100 INJECTION, SOLUTION SUBCUTANEOUS at 21:01

## 2020-01-01 RX ADMIN — CEFTRIAXONE SODIUM 2 G: 2 INJECTION, POWDER, FOR SOLUTION INTRAMUSCULAR; INTRAVENOUS at 10:36

## 2020-01-01 RX ADMIN — METOPROLOL TARTRATE 100 MG: 100 TABLET ORAL at 08:25

## 2020-01-01 RX ADMIN — SODIUM CHLORIDE, PRESERVATIVE FREE 10 ML: 5 INJECTION INTRAVENOUS at 09:34

## 2020-01-01 RX ADMIN — OXYCODONE HYDROCHLORIDE AND ACETAMINOPHEN 1 TABLET: 10; 325 TABLET ORAL at 17:52

## 2020-01-01 RX ADMIN — INSULIN LISPRO 2 UNITS: 100 INJECTION, SOLUTION INTRAVENOUS; SUBCUTANEOUS at 09:13

## 2020-01-01 RX ADMIN — AMPICILLIN SODIUM AND SULBACTAM SODIUM 3 G: 2; 1 INJECTION, POWDER, FOR SOLUTION INTRAMUSCULAR; INTRAVENOUS at 09:20

## 2020-01-01 RX ADMIN — DULOXETINE HYDROCHLORIDE 60 MG: 60 CAPSULE, DELAYED RELEASE ORAL at 12:30

## 2020-01-01 RX ADMIN — HYDROCODONE BITARTRATE AND ACETAMINOPHEN 1 TABLET: 5; 325 TABLET ORAL at 00:53

## 2020-01-01 RX ADMIN — HEPARIN SODIUM 5000 UNITS: 5000 INJECTION, SOLUTION INTRAVENOUS; SUBCUTANEOUS at 07:29

## 2020-01-01 RX ADMIN — METRONIDAZOLE 500 MG: 500 INJECTION, SOLUTION INTRAVENOUS at 08:25

## 2020-01-01 RX ADMIN — MICAFUNGIN SODIUM 100 MG: 100 INJECTION, POWDER, LYOPHILIZED, FOR SOLUTION INTRAVENOUS at 17:24

## 2020-01-01 RX ADMIN — METRONIDAZOLE 500 MG: 500 INJECTION, SOLUTION INTRAVENOUS at 17:01

## 2020-01-01 RX ADMIN — GABAPENTIN 300 MG: 300 CAPSULE ORAL at 21:04

## 2020-01-01 RX ADMIN — INSULIN LISPRO 5 UNITS: 100 INJECTION, SOLUTION INTRAVENOUS; SUBCUTANEOUS at 08:23

## 2020-01-01 RX ADMIN — SODIUM CHLORIDE, PRESERVATIVE FREE 10 ML: 5 INJECTION INTRAVENOUS at 08:35

## 2020-01-01 RX ADMIN — TAZOBACTAM SODIUM AND PIPERACILLIN SODIUM 3.38 G: 375; 3 INJECTION, SOLUTION INTRAVENOUS at 04:17

## 2020-01-01 RX ADMIN — GABAPENTIN 100 MG: 100 CAPSULE ORAL at 12:30

## 2020-01-01 RX ADMIN — TAZOBACTAM SODIUM AND PIPERACILLIN SODIUM 3.38 G: 375; 3 INJECTION, SOLUTION INTRAVENOUS at 20:00

## 2020-01-01 RX ADMIN — ONDANSETRON 4 MG: 2 INJECTION INTRAMUSCULAR; INTRAVENOUS at 13:02

## 2020-01-01 RX ADMIN — INSULIN LISPRO 3 UNITS: 100 INJECTION, SOLUTION INTRAVENOUS; SUBCUTANEOUS at 17:07

## 2020-01-01 RX ADMIN — METRONIDAZOLE 500 MG: 500 INJECTION, SOLUTION INTRAVENOUS at 09:31

## 2020-01-01 RX ADMIN — MICAFUNGIN SODIUM 100 MG: 100 INJECTION, POWDER, LYOPHILIZED, FOR SOLUTION INTRAVENOUS at 17:01

## 2020-01-01 RX ADMIN — MICAFUNGIN SODIUM 100 MG: 100 INJECTION, POWDER, LYOPHILIZED, FOR SOLUTION INTRAVENOUS at 21:31

## 2020-01-01 RX ADMIN — METRONIDAZOLE 500 MG: 500 INJECTION, SOLUTION INTRAVENOUS at 00:36

## 2020-01-01 RX ADMIN — FUROSEMIDE 40 MG: 10 INJECTION, SOLUTION INTRAMUSCULAR; INTRAVENOUS at 00:23

## 2020-01-01 RX ADMIN — METRONIDAZOLE 500 MG: 500 INJECTION, SOLUTION INTRAVENOUS at 18:12

## 2020-01-01 RX ADMIN — SUCCINYLCHOLINE CHLORIDE 110 MG: 20 INJECTION, SOLUTION INTRAMUSCULAR; INTRAVENOUS; PARENTERAL at 12:09

## 2020-01-01 RX ADMIN — Medication 250 MG: at 09:02

## 2020-01-01 RX ADMIN — FUROSEMIDE 40 MG: 40 TABLET ORAL at 22:20

## 2020-01-01 RX ADMIN — METOPROLOL TARTRATE 100 MG: 100 TABLET ORAL at 20:57

## 2020-01-01 RX ADMIN — METRONIDAZOLE 500 MG: 500 INJECTION, SOLUTION INTRAVENOUS at 05:48

## 2020-01-01 RX ADMIN — TERAZOSIN HYDROCHLORIDE 10 MG: 5 CAPSULE ORAL at 22:06

## 2020-01-01 RX ADMIN — INSULIN LISPRO 3 UNITS: 100 INJECTION, SOLUTION INTRAVENOUS; SUBCUTANEOUS at 08:33

## 2020-01-01 RX ADMIN — METOPROLOL TARTRATE 100 MG: 100 TABLET ORAL at 20:20

## 2020-01-01 RX ADMIN — HEPARIN SODIUM 5000 UNITS: 5000 INJECTION, SOLUTION INTRAVENOUS; SUBCUTANEOUS at 14:05

## 2020-01-01 RX ADMIN — AMPICILLIN SODIUM AND SULBACTAM SODIUM 3 G: 2; 1 INJECTION, POWDER, FOR SOLUTION INTRAMUSCULAR; INTRAVENOUS at 14:26

## 2020-01-01 RX ADMIN — HYDRALAZINE HYDROCHLORIDE 50 MG: 50 TABLET, FILM COATED ORAL at 05:54

## 2020-01-01 RX ADMIN — HEPARIN SODIUM 5000 UNITS: 5000 INJECTION, SOLUTION INTRAVENOUS; SUBCUTANEOUS at 22:06

## 2020-01-01 RX ADMIN — VANCOMYCIN HYDROCHLORIDE 250 MG: KIT at 13:04

## 2020-01-01 RX ADMIN — AMLODIPINE BESYLATE 10 MG: 10 TABLET ORAL at 10:40

## 2020-01-01 RX ADMIN — ACETAMINOPHEN 650 MG: 325 TABLET, FILM COATED ORAL at 21:50

## 2020-01-01 RX ADMIN — LORAZEPAM 1 MG: 2 INJECTION INTRAMUSCULAR; INTRAVENOUS at 04:19

## 2020-01-01 RX ADMIN — DAPTOMYCIN 550 MG: 500 INJECTION, POWDER, LYOPHILIZED, FOR SOLUTION INTRAVENOUS at 11:37

## 2020-01-01 RX ADMIN — LORAZEPAM 1 MG: 2 INJECTION INTRAMUSCULAR; INTRAVENOUS at 08:51

## 2020-01-01 RX ADMIN — MICONAZOLE NITRATE: 20 POWDER TOPICAL at 14:24

## 2020-01-01 RX ADMIN — FENTANYL 1 PATCH: 12 PATCH, EXTENDED RELEASE TRANSDERMAL at 16:36

## 2020-01-01 RX ADMIN — HYDROXYZINE HYDROCHLORIDE 25 MG: 25 TABLET, FILM COATED ORAL at 23:58

## 2020-01-01 RX ADMIN — METRONIDAZOLE 500 MG: 500 INJECTION, SOLUTION INTRAVENOUS at 10:06

## 2020-01-01 RX ADMIN — TAZOBACTAM SODIUM AND PIPERACILLIN SODIUM 3.38 G: 375; 3 INJECTION, SOLUTION INTRAVENOUS at 03:42

## 2020-01-01 RX ADMIN — Medication 250 MG: at 21:42

## 2020-01-01 RX ADMIN — INSULIN DETEMIR 10 UNITS: 100 INJECTION, SOLUTION SUBCUTANEOUS at 21:51

## 2020-01-01 RX ADMIN — AMPICILLIN SODIUM AND SULBACTAM SODIUM 3 G: 2; 1 INJECTION, POWDER, FOR SOLUTION INTRAMUSCULAR; INTRAVENOUS at 09:12

## 2020-01-01 RX ADMIN — MEROPENEM 1 G: 1 INJECTION, POWDER, FOR SOLUTION INTRAVENOUS at 17:26

## 2020-01-01 RX ADMIN — HEPARIN SODIUM 5000 UNITS: 5000 INJECTION, SOLUTION INTRAVENOUS; SUBCUTANEOUS at 05:42

## 2020-01-01 RX ADMIN — AMPICILLIN SODIUM AND SULBACTAM SODIUM 3 G: 2; 1 INJECTION, POWDER, FOR SOLUTION INTRAMUSCULAR; INTRAVENOUS at 01:58

## 2020-01-01 RX ADMIN — MELATONIN TAB 5 MG 5 MG: 5 TAB at 20:34

## 2020-01-01 RX ADMIN — INSULIN LISPRO 3 UNITS: 100 INJECTION, SOLUTION INTRAVENOUS; SUBCUTANEOUS at 17:27

## 2020-01-01 RX ADMIN — VANCOMYCIN HYDROCHLORIDE 250 MG: KIT at 18:02

## 2020-01-01 RX ADMIN — TERAZOSIN HYDROCHLORIDE 10 MG: 5 CAPSULE ORAL at 20:20

## 2020-01-01 RX ADMIN — ONDANSETRON 4 MG: 2 INJECTION INTRAMUSCULAR; INTRAVENOUS at 06:04

## 2020-01-01 RX ADMIN — MEROPENEM 1 G: 1 INJECTION, POWDER, FOR SOLUTION INTRAVENOUS at 08:42

## 2020-01-01 RX ADMIN — OXYCODONE HYDROCHLORIDE AND ACETAMINOPHEN 1 TABLET: 10; 325 TABLET ORAL at 07:45

## 2020-01-01 RX ADMIN — MICONAZOLE NITRATE: 20 POWDER TOPICAL at 20:39

## 2020-01-01 RX ADMIN — AMPICILLIN SODIUM AND SULBACTAM SODIUM 3 G: 2; 1 INJECTION, POWDER, FOR SOLUTION INTRAMUSCULAR; INTRAVENOUS at 23:09

## 2020-01-01 RX ADMIN — FUROSEMIDE 40 MG: 40 TABLET ORAL at 09:57

## 2020-01-01 RX ADMIN — DULOXETINE HYDROCHLORIDE 60 MG: 60 CAPSULE, DELAYED RELEASE ORAL at 09:09

## 2020-01-01 RX ADMIN — AMLODIPINE BESYLATE 10 MG: 10 TABLET ORAL at 08:46

## 2020-01-01 RX ADMIN — INSULIN DETEMIR 5 UNITS: 100 INJECTION, SOLUTION SUBCUTANEOUS at 20:53

## 2020-01-01 RX ADMIN — METOPROLOL TARTRATE 100 MG: 100 TABLET ORAL at 08:35

## 2020-01-01 RX ADMIN — VANCOMYCIN HYDROCHLORIDE 1500 MG: 10 INJECTION, POWDER, LYOPHILIZED, FOR SOLUTION INTRAVENOUS at 06:35

## 2020-01-01 RX ADMIN — ENOXAPARIN SODIUM 40 MG: 40 INJECTION SUBCUTANEOUS at 08:13

## 2020-01-01 RX ADMIN — METOPROLOL TARTRATE 100 MG: 100 TABLET ORAL at 21:57

## 2020-01-01 RX ADMIN — METRONIDAZOLE 500 MG: 500 INJECTION, SOLUTION INTRAVENOUS at 05:33

## 2020-01-01 RX ADMIN — Medication 250 MG: at 09:12

## 2020-01-01 RX ADMIN — MICONAZOLE NITRATE: 20 POWDER TOPICAL at 09:24

## 2020-01-01 RX ADMIN — VANCOMYCIN HYDROCHLORIDE 250 MG: KIT at 12:00

## 2020-01-01 RX ADMIN — VANCOMYCIN HYDROCHLORIDE 2250 MG: 1 INJECTION, POWDER, LYOPHILIZED, FOR SOLUTION INTRAVENOUS at 19:25

## 2020-01-01 RX ADMIN — METOPROLOL TARTRATE 100 MG: 100 TABLET ORAL at 08:58

## 2020-01-01 RX ADMIN — INSULIN LISPRO 2 UNITS: 100 INJECTION, SOLUTION INTRAVENOUS; SUBCUTANEOUS at 17:23

## 2020-01-01 RX ADMIN — HEPARIN SODIUM 5000 UNITS: 5000 INJECTION, SOLUTION INTRAVENOUS; SUBCUTANEOUS at 14:24

## 2020-01-01 RX ADMIN — INSULIN LISPRO 6 UNITS: 100 INJECTION, SOLUTION INTRAVENOUS; SUBCUTANEOUS at 11:46

## 2020-01-01 RX ADMIN — METOPROLOL TARTRATE 100 MG: 100 TABLET ORAL at 22:06

## 2020-01-01 RX ADMIN — Medication 250 MG: at 08:15

## 2020-01-01 RX ADMIN — AMPICILLIN SODIUM AND SULBACTAM SODIUM 3 G: 2; 1 INJECTION, POWDER, FOR SOLUTION INTRAMUSCULAR; INTRAVENOUS at 21:04

## 2020-01-01 RX ADMIN — VANCOMYCIN HYDROCHLORIDE 250 MG: KIT at 11:39

## 2020-01-01 RX ADMIN — MICAFUNGIN SODIUM 100 MG: 20 INJECTION, POWDER, LYOPHILIZED, FOR SOLUTION INTRAVENOUS at 13:07

## 2020-01-01 RX ADMIN — METRONIDAZOLE 500 MG: 500 INJECTION, SOLUTION INTRAVENOUS at 02:38

## 2020-01-01 RX ADMIN — LORAZEPAM 0.25 MG: 2 INJECTION INTRAMUSCULAR; INTRAVENOUS at 20:31

## 2020-01-01 RX ADMIN — TAZOBACTAM SODIUM AND PIPERACILLIN SODIUM 3.38 G: 375; 3 INJECTION, SOLUTION INTRAVENOUS at 20:07

## 2020-01-01 RX ADMIN — AMLODIPINE BESYLATE 10 MG: 10 TABLET ORAL at 09:26

## 2020-01-01 RX ADMIN — METRONIDAZOLE 500 MG: 500 INJECTION, SOLUTION INTRAVENOUS at 05:35

## 2020-01-01 RX ADMIN — NYSTATIN: 100000 POWDER TOPICAL at 21:43

## 2020-01-01 RX ADMIN — DULOXETINE HYDROCHLORIDE 60 MG: 60 CAPSULE, DELAYED RELEASE ORAL at 15:35

## 2020-01-01 RX ADMIN — METRONIDAZOLE 500 MG: 500 INJECTION, SOLUTION INTRAVENOUS at 16:20

## 2020-01-01 RX ADMIN — MEROPENEM 1 G: 1 INJECTION, POWDER, FOR SOLUTION INTRAVENOUS at 08:57

## 2020-01-01 RX ADMIN — METRONIDAZOLE 500 MG: 500 INJECTION, SOLUTION INTRAVENOUS at 08:31

## 2020-01-01 RX ADMIN — METRONIDAZOLE 500 MG: 500 INJECTION, SOLUTION INTRAVENOUS at 22:06

## 2020-01-01 RX ADMIN — GABAPENTIN 100 MG: 100 CAPSULE ORAL at 08:30

## 2020-01-01 RX ADMIN — INSULIN DETEMIR 18 UNITS: 100 INJECTION, SOLUTION SUBCUTANEOUS at 08:37

## 2020-01-01 RX ADMIN — HEPARIN SODIUM 5000 UNITS: 5000 INJECTION, SOLUTION INTRAVENOUS; SUBCUTANEOUS at 13:22

## 2020-01-01 RX ADMIN — DOCUSATE SODIUM 100 MG: 100 CAPSULE ORAL at 08:35

## 2020-01-01 RX ADMIN — INSULIN DETEMIR 5 UNITS: 100 INJECTION, SOLUTION SUBCUTANEOUS at 21:12

## 2020-01-01 RX ADMIN — VANCOMYCIN HYDROCHLORIDE 125 MG: KIT at 11:16

## 2020-01-01 RX ADMIN — MICAFUNGIN SODIUM 100 MG: 20 INJECTION, POWDER, LYOPHILIZED, FOR SOLUTION INTRAVENOUS at 11:29

## 2020-01-01 RX ADMIN — QUETIAPINE FUMARATE 12.5 MG: 25 TABLET ORAL at 20:50

## 2020-01-01 RX ADMIN — METRONIDAZOLE 500 MG: 500 INJECTION, SOLUTION INTRAVENOUS at 13:00

## 2020-01-01 RX ADMIN — METRONIDAZOLE 500 MG: 500 INJECTION, SOLUTION INTRAVENOUS at 14:29

## 2020-01-01 RX ADMIN — AMPICILLIN SODIUM AND SULBACTAM SODIUM 3 G: 2; 1 INJECTION, POWDER, FOR SOLUTION INTRAMUSCULAR; INTRAVENOUS at 02:07

## 2020-01-01 RX ADMIN — MICONAZOLE NITRATE 1 APPLICATION: 2 CREAM TOPICAL at 23:16

## 2020-01-01 RX ADMIN — SODIUM CHLORIDE, PRESERVATIVE FREE 10 ML: 5 INJECTION INTRAVENOUS at 08:26

## 2020-01-01 RX ADMIN — VANCOMYCIN HYDROCHLORIDE 250 MG: KIT at 05:44

## 2020-01-01 RX ADMIN — VANCOMYCIN HYDROCHLORIDE 250 MG: KIT at 17:28

## 2020-01-01 RX ADMIN — MELATONIN TAB 5 MG 5 MG: 5 TAB at 20:50

## 2020-01-01 RX ADMIN — VANCOMYCIN HYDROCHLORIDE 250 MG: KIT at 23:10

## 2020-01-01 RX ADMIN — AMLODIPINE BESYLATE 5 MG: 5 TABLET ORAL at 09:53

## 2020-01-01 RX ADMIN — Medication 250 MG: at 23:23

## 2020-01-01 RX ADMIN — HEPARIN SODIUM 5000 UNITS: 5000 INJECTION INTRAVENOUS; SUBCUTANEOUS at 20:18

## 2020-01-01 RX ADMIN — GABAPENTIN 100 MG: 100 CAPSULE ORAL at 09:16

## 2020-01-01 RX ADMIN — CASTOR OIL AND BALSAM, PERU: 788; 87 OINTMENT TOPICAL at 20:13

## 2020-01-01 RX ADMIN — MORPHINE SULFATE 4 MG: 4 INJECTION, SOLUTION INTRAMUSCULAR; INTRAVENOUS at 01:47

## 2020-01-01 RX ADMIN — SODIUM CHLORIDE, POTASSIUM CHLORIDE, SODIUM LACTATE AND CALCIUM CHLORIDE 9 ML/HR: 600; 310; 30; 20 INJECTION, SOLUTION INTRAVENOUS at 11:37

## 2020-01-01 RX ADMIN — AMPICILLIN SODIUM AND SULBACTAM SODIUM 3 G: 2; 1 INJECTION, POWDER, FOR SOLUTION INTRAMUSCULAR; INTRAVENOUS at 15:53

## 2020-01-01 RX ADMIN — MORPHINE SULFATE 4 MG: 4 INJECTION, SOLUTION INTRAMUSCULAR; INTRAVENOUS at 08:36

## 2020-01-01 RX ADMIN — INSULIN LISPRO 3 UNITS: 100 INJECTION, SOLUTION INTRAVENOUS; SUBCUTANEOUS at 18:08

## 2020-01-01 RX ADMIN — TAZOBACTAM SODIUM AND PIPERACILLIN SODIUM 3.38 G: 375; 3 INJECTION, SOLUTION INTRAVENOUS at 10:58

## 2020-01-01 RX ADMIN — TAZOBACTAM SODIUM AND PIPERACILLIN SODIUM 3.38 G: 375; 3 INJECTION, SOLUTION INTRAVENOUS at 04:03

## 2020-01-01 RX ADMIN — HEPARIN SODIUM 5000 UNITS: 5000 INJECTION, SOLUTION INTRAVENOUS; SUBCUTANEOUS at 13:03

## 2020-01-01 RX ADMIN — FUROSEMIDE 40 MG: 40 TABLET ORAL at 09:28

## 2020-01-01 RX ADMIN — LORAZEPAM 2 MG: 1 TABLET ORAL at 20:50

## 2020-01-01 RX ADMIN — DULOXETINE HYDROCHLORIDE 60 MG: 60 CAPSULE, DELAYED RELEASE ORAL at 08:01

## 2020-01-01 RX ADMIN — HYDRALAZINE HYDROCHLORIDE 10 MG: 10 TABLET ORAL at 20:57

## 2020-01-01 RX ADMIN — OXYCODONE 5 MG: 5 TABLET ORAL at 02:02

## 2020-01-01 RX ADMIN — MEROPENEM 1 G: 1 INJECTION, POWDER, FOR SOLUTION INTRAVENOUS at 18:13

## 2020-01-01 RX ADMIN — TERAZOSIN HYDROCHLORIDE ANHYDROUS 10 MG: 5 CAPSULE ORAL at 20:14

## 2020-01-01 RX ADMIN — WATER: 1 INJECTION INTRAMUSCULAR; INTRAVENOUS; SUBCUTANEOUS at 18:14

## 2020-01-01 RX ADMIN — SODIUM CHLORIDE, PRESERVATIVE FREE 10 ML: 5 INJECTION INTRAVENOUS at 20:52

## 2020-01-01 RX ADMIN — Medication 250 MG: at 19:45

## 2020-01-01 RX ADMIN — ASPIRIN 81 MG: 81 TABLET, COATED ORAL at 08:19

## 2020-01-01 RX ADMIN — NYSTATIN: 100000 POWDER TOPICAL at 19:45

## 2020-01-01 RX ADMIN — Medication 250 MG: at 21:16

## 2020-01-01 RX ADMIN — TERAZOSIN HYDROCHLORIDE 10 MG: 5 CAPSULE ORAL at 20:56

## 2020-01-01 RX ADMIN — TERAZOSIN HYDROCHLORIDE ANHYDROUS 10 MG: 5 CAPSULE ORAL at 20:55

## 2020-01-01 RX ADMIN — METOPROLOL TARTRATE 100 MG: 100 TABLET ORAL at 21:22

## 2020-01-01 RX ADMIN — HYDRALAZINE HYDROCHLORIDE 10 MG: 10 TABLET ORAL at 12:11

## 2020-01-01 RX ADMIN — SODIUM CHLORIDE, PRESERVATIVE FREE 10 ML: 5 INJECTION INTRAVENOUS at 08:24

## 2020-01-01 RX ADMIN — NYSTATIN 1 APPLICATION: 100000 CREAM TOPICAL at 20:03

## 2020-01-01 RX ADMIN — OXYCODONE HYDROCHLORIDE AND ACETAMINOPHEN 1 TABLET: 10; 325 TABLET ORAL at 05:44

## 2020-01-01 RX ADMIN — METRONIDAZOLE 500 MG: 500 INJECTION, SOLUTION INTRAVENOUS at 05:16

## 2020-01-01 RX ADMIN — VANCOMYCIN HYDROCHLORIDE 125 MG: KIT at 06:13

## 2020-01-01 RX ADMIN — AMLODIPINE BESYLATE 5 MG: 5 TABLET ORAL at 08:41

## 2020-01-01 RX ADMIN — INSULIN LISPRO 3 UNITS: 100 INJECTION, SOLUTION INTRAVENOUS; SUBCUTANEOUS at 11:29

## 2020-01-01 RX ADMIN — FENTANYL CITRATE 100 MCG: 50 INJECTION, SOLUTION INTRAMUSCULAR; INTRAVENOUS at 12:40

## 2020-01-01 RX ADMIN — QUETIAPINE FUMARATE 12.5 MG: 25 TABLET ORAL at 20:51

## 2020-01-01 RX ADMIN — INSULIN DETEMIR 20 UNITS: 100 INJECTION, SOLUTION SUBCUTANEOUS at 08:29

## 2020-01-01 RX ADMIN — LIDOCAINE HYDROCHLORIDE 50 MG: 20 INJECTION, SOLUTION INFILTRATION; PERINEURAL at 12:40

## 2020-01-01 RX ADMIN — METOPROLOL TARTRATE 100 MG: 100 TABLET ORAL at 08:19

## 2020-01-01 RX ADMIN — AMPICILLIN SODIUM AND SULBACTAM SODIUM 3 G: 2; 1 INJECTION, POWDER, FOR SOLUTION INTRAMUSCULAR; INTRAVENOUS at 09:56

## 2020-01-01 RX ADMIN — TAZOBACTAM SODIUM AND PIPERACILLIN SODIUM 3.38 G: 375; 3 INJECTION, SOLUTION INTRAVENOUS at 20:46

## 2020-01-01 RX ADMIN — AMPICILLIN SODIUM AND SULBACTAM SODIUM 3 G: 2; 1 INJECTION, POWDER, FOR SOLUTION INTRAMUSCULAR; INTRAVENOUS at 03:34

## 2020-01-01 RX ADMIN — DAPTOMYCIN 550 MG: 500 INJECTION, POWDER, LYOPHILIZED, FOR SOLUTION INTRAVENOUS at 11:43

## 2020-01-01 RX ADMIN — INSULIN LISPRO 2 UNITS: 100 INJECTION, SOLUTION INTRAVENOUS; SUBCUTANEOUS at 13:50

## 2020-01-01 RX ADMIN — INSULIN DETEMIR 5 UNITS: 100 INJECTION, SOLUTION SUBCUTANEOUS at 08:55

## 2020-01-01 RX ADMIN — TAZOBACTAM SODIUM AND PIPERACILLIN SODIUM 3.38 G: 375; 3 INJECTION, SOLUTION INTRAVENOUS at 12:11

## 2020-01-01 RX ADMIN — INSULIN DETEMIR 10 UNITS: 100 INJECTION, SOLUTION SUBCUTANEOUS at 08:17

## 2020-01-01 RX ADMIN — VANCOMYCIN HYDROCHLORIDE 250 MG: KIT at 12:50

## 2020-01-01 RX ADMIN — SPIRONOLACTONE 25 MG: 25 TABLET ORAL at 09:58

## 2020-01-01 RX ADMIN — HYDROCORTISONE: 25 CREAM TOPICAL at 10:16

## 2020-01-01 RX ADMIN — HYDRALAZINE HYDROCHLORIDE 50 MG: 50 TABLET, FILM COATED ORAL at 05:34

## 2020-01-01 RX ADMIN — MEROPENEM 1 G: 1 INJECTION, POWDER, FOR SOLUTION INTRAVENOUS at 01:41

## 2020-01-01 RX ADMIN — VANCOMYCIN HYDROCHLORIDE 125 MG: KIT at 11:50

## 2020-01-01 RX ADMIN — OXYCODONE HYDROCHLORIDE AND ACETAMINOPHEN 1 TABLET: 5; 325 TABLET ORAL at 17:34

## 2020-01-01 RX ADMIN — METOPROLOL TARTRATE 100 MG: 100 TABLET ORAL at 21:15

## 2020-01-01 RX ADMIN — METRONIDAZOLE 500 MG: 500 INJECTION, SOLUTION INTRAVENOUS at 21:11

## 2020-01-01 RX ADMIN — LIDOCAINE HYDROCHLORIDE 1 ML: 20 JELLY TOPICAL at 18:39

## 2020-01-01 RX ADMIN — SPIRONOLACTONE 25 MG: 25 TABLET ORAL at 08:19

## 2020-01-01 RX ADMIN — CASTOR OIL AND BALSAM, PERU: 788; 87 OINTMENT TOPICAL at 08:57

## 2020-01-01 RX ADMIN — ASPIRIN 81 MG: 81 TABLET, COATED ORAL at 10:40

## 2020-01-01 RX ADMIN — VANCOMYCIN HYDROCHLORIDE 250 MG: KIT at 12:34

## 2020-01-01 RX ADMIN — MEROPENEM 1 G: 1 INJECTION, POWDER, FOR SOLUTION INTRAVENOUS at 09:54

## 2020-01-01 RX ADMIN — METOPROLOL TARTRATE 100 MG: 100 TABLET ORAL at 08:53

## 2020-01-01 RX ADMIN — DOCUSATE SODIUM 50MG AND SENNOSIDES 8.6MG 1 TABLET: 8.6; 5 TABLET, FILM COATED ORAL at 21:15

## 2020-01-01 RX ADMIN — DULOXETINE HYDROCHLORIDE 60 MG: 60 CAPSULE, DELAYED RELEASE ORAL at 10:16

## 2020-01-01 RX ADMIN — HEPARIN SODIUM 5000 UNITS: 5000 INJECTION, SOLUTION INTRAVENOUS; SUBCUTANEOUS at 15:07

## 2020-01-01 RX ADMIN — SPIRONOLACTONE 25 MG: 25 TABLET ORAL at 09:57

## 2020-01-01 RX ADMIN — OXYCODONE HYDROCHLORIDE AND ACETAMINOPHEN 2 TABLET: 5; 325 TABLET ORAL at 12:37

## 2020-01-01 RX ADMIN — SODIUM CHLORIDE 75 ML/HR: 9 INJECTION, SOLUTION INTRAVENOUS at 17:23

## 2020-01-01 RX ADMIN — INSULIN LISPRO 3 UNITS: 100 INJECTION, SOLUTION INTRAVENOUS; SUBCUTANEOUS at 10:33

## 2020-01-01 RX ADMIN — INSULIN LISPRO 2 UNITS: 100 INJECTION, SOLUTION INTRAVENOUS; SUBCUTANEOUS at 09:12

## 2020-01-01 RX ADMIN — ESCITALOPRAM OXALATE 10 MG: 10 TABLET ORAL at 20:18

## 2020-01-01 RX ADMIN — LIDOCAINE HYDROCHLORIDE 0.2 ML: 10 INJECTION, SOLUTION EPIDURAL; INFILTRATION; INTRACAUDAL; PERINEURAL at 10:59

## 2020-01-01 RX ADMIN — INSULIN LISPRO 3 UNITS: 100 INJECTION, SOLUTION INTRAVENOUS; SUBCUTANEOUS at 12:34

## 2020-01-01 RX ADMIN — AMLODIPINE BESYLATE 10 MG: 10 TABLET ORAL at 09:12

## 2020-01-01 RX ADMIN — MORPHINE SULFATE 2 MG: 2 INJECTION, SOLUTION INTRAMUSCULAR; INTRAVENOUS at 15:28

## 2020-01-01 RX ADMIN — Medication 250 MG: at 09:58

## 2020-01-01 RX ADMIN — INSULIN LISPRO 2 UNITS: 100 INJECTION, SOLUTION INTRAVENOUS; SUBCUTANEOUS at 10:00

## 2020-01-01 RX ADMIN — OXYCODONE HYDROCHLORIDE AND ACETAMINOPHEN 1 TABLET: 10; 325 TABLET ORAL at 08:19

## 2020-01-01 RX ADMIN — INSULIN LISPRO 2 UNITS: 100 INJECTION, SOLUTION INTRAVENOUS; SUBCUTANEOUS at 16:51

## 2020-01-01 RX ADMIN — VANCOMYCIN HYDROCHLORIDE 250 MG: KIT at 06:04

## 2020-01-01 RX ADMIN — INSULIN LISPRO 2 UNITS: 100 INJECTION, SOLUTION INTRAVENOUS; SUBCUTANEOUS at 12:31

## 2020-01-01 RX ADMIN — INSULIN LISPRO 2 UNITS: 100 INJECTION, SOLUTION INTRAVENOUS; SUBCUTANEOUS at 17:13

## 2020-01-01 RX ADMIN — HEPARIN SODIUM 5000 UNITS: 5000 INJECTION, SOLUTION INTRAVENOUS; SUBCUTANEOUS at 14:25

## 2020-01-01 RX ADMIN — INSULIN LISPRO 3 UNITS: 100 INJECTION, SOLUTION INTRAVENOUS; SUBCUTANEOUS at 17:36

## 2020-01-01 RX ADMIN — METRONIDAZOLE 500 MG: 500 INJECTION, SOLUTION INTRAVENOUS at 10:19

## 2020-01-01 RX ADMIN — HEPARIN SODIUM 5000 UNITS: 5000 INJECTION, SOLUTION INTRAVENOUS; SUBCUTANEOUS at 05:33

## 2020-01-01 RX ADMIN — LABETALOL 20 MG/4 ML (5 MG/ML) INTRAVENOUS SYRINGE 10 MG: at 19:14

## 2020-01-01 RX ADMIN — OXYCODONE HYDROCHLORIDE AND ACETAMINOPHEN 2 TABLET: 5; 325 TABLET ORAL at 16:03

## 2020-01-01 RX ADMIN — ASPIRIN 81 MG: 81 TABLET, FILM COATED ORAL at 08:15

## 2020-01-01 RX ADMIN — ASPIRIN 81 MG: 81 TABLET, COATED ORAL at 09:28

## 2020-01-01 RX ADMIN — DULOXETINE HYDROCHLORIDE 60 MG: 60 CAPSULE, DELAYED RELEASE ORAL at 09:12

## 2020-01-01 RX ADMIN — MEROPENEM 1 G: 1 INJECTION, POWDER, FOR SOLUTION INTRAVENOUS at 18:33

## 2020-01-01 RX ADMIN — OXYCODONE HYDROCHLORIDE AND ACETAMINOPHEN 1 TABLET: 10; 325 TABLET ORAL at 04:32

## 2020-01-01 RX ADMIN — HEPARIN SODIUM 5000 UNITS: 5000 INJECTION, SOLUTION INTRAVENOUS; SUBCUTANEOUS at 12:57

## 2020-01-01 RX ADMIN — HEPARIN SODIUM 5000 UNITS: 5000 INJECTION, SOLUTION INTRAVENOUS; SUBCUTANEOUS at 05:36

## 2020-01-01 RX ADMIN — TAZOBACTAM SODIUM AND PIPERACILLIN SODIUM 3.38 G: 375; 3 INJECTION, SOLUTION INTRAVENOUS at 03:26

## 2020-01-01 RX ADMIN — METRONIDAZOLE 500 MG: 500 INJECTION, SOLUTION INTRAVENOUS at 17:15

## 2020-01-01 RX ADMIN — HEPARIN SODIUM 5000 UNITS: 5000 INJECTION, SOLUTION INTRAVENOUS; SUBCUTANEOUS at 05:32

## 2020-01-01 RX ADMIN — SODIUM CHLORIDE, PRESERVATIVE FREE 10 ML: 5 INJECTION INTRAVENOUS at 09:50

## 2020-01-01 RX ADMIN — OXYCODONE 5 MG: 5 TABLET ORAL at 00:18

## 2020-01-01 RX ADMIN — INSULIN LISPRO 2 UNITS: 100 INJECTION, SOLUTION INTRAVENOUS; SUBCUTANEOUS at 17:53

## 2020-01-01 RX ADMIN — OXYCODONE 5 MG: 5 TABLET ORAL at 23:35

## 2020-01-01 RX ADMIN — HYDROCORTISONE: 25 CREAM TOPICAL at 21:42

## 2020-01-01 RX ADMIN — DOCUSATE SODIUM 50MG AND SENNOSIDES 8.6MG 2 TABLET: 8.6; 5 TABLET, FILM COATED ORAL at 22:06

## 2020-01-01 RX ADMIN — HYDRALAZINE HYDROCHLORIDE 10 MG: 10 TABLET ORAL at 16:02

## 2020-01-01 RX ADMIN — MEROPENEM 1 G: 1 INJECTION, POWDER, FOR SOLUTION INTRAVENOUS at 09:12

## 2020-01-01 RX ADMIN — METOPROLOL TARTRATE 100 MG: 100 TABLET ORAL at 21:02

## 2020-01-01 RX ADMIN — TERAZOSIN HYDROCHLORIDE 5 MG: 5 CAPSULE ORAL at 20:37

## 2020-01-01 RX ADMIN — ONDANSETRON 4 MG: 2 INJECTION INTRAMUSCULAR; INTRAVENOUS at 17:36

## 2020-01-01 RX ADMIN — OXYCODONE 5 MG: 5 TABLET ORAL at 12:21

## 2020-01-01 RX ADMIN — INSULIN LISPRO 6 UNITS: 100 INJECTION, SOLUTION INTRAVENOUS; SUBCUTANEOUS at 18:19

## 2020-01-01 RX ADMIN — VANCOMYCIN HYDROCHLORIDE 250 MG: KIT at 08:58

## 2020-01-01 RX ADMIN — SODIUM CHLORIDE, PRESERVATIVE FREE 10 ML: 5 INJECTION INTRAVENOUS at 09:10

## 2020-01-01 RX ADMIN — HEPARIN SODIUM 5000 UNITS: 5000 INJECTION, SOLUTION INTRAVENOUS; SUBCUTANEOUS at 06:13

## 2020-01-01 RX ADMIN — Medication 5 ML: at 03:58

## 2020-01-01 RX ADMIN — METRONIDAZOLE 500 MG: 500 TABLET ORAL at 21:42

## 2020-01-01 RX ADMIN — AMPICILLIN SODIUM AND SULBACTAM SODIUM 3 G: 2; 1 INJECTION, POWDER, FOR SOLUTION INTRAMUSCULAR; INTRAVENOUS at 21:15

## 2020-01-01 RX ADMIN — DULOXETINE HYDROCHLORIDE 60 MG: 60 CAPSULE, DELAYED RELEASE ORAL at 08:58

## 2020-01-01 RX ADMIN — DEXTROSE 15 G: 15 GEL ORAL at 17:27

## 2020-01-01 RX ADMIN — HEPARIN SODIUM 5000 UNITS: 5000 INJECTION, SOLUTION INTRAVENOUS; SUBCUTANEOUS at 05:28

## 2020-01-01 RX ADMIN — HYDRALAZINE HYDROCHLORIDE 5 MG: 20 INJECTION INTRAMUSCULAR; INTRAVENOUS at 14:21

## 2020-01-01 RX ADMIN — INSULIN LISPRO 2 UNITS: 100 INJECTION, SOLUTION INTRAVENOUS; SUBCUTANEOUS at 11:59

## 2020-01-01 RX ADMIN — SODIUM CHLORIDE, PRESERVATIVE FREE 10 ML: 5 INJECTION INTRAVENOUS at 17:57

## 2020-01-01 RX ADMIN — CASTOR OIL AND BALSAM, PERU: 788; 87 OINTMENT TOPICAL at 08:41

## 2020-01-01 RX ADMIN — METRONIDAZOLE 500 MG: 500 INJECTION, SOLUTION INTRAVENOUS at 09:57

## 2020-01-01 RX ADMIN — ASPIRIN 81 MG: 81 TABLET, COATED ORAL at 08:58

## 2020-01-01 RX ADMIN — QUETIAPINE FUMARATE 12.5 MG: 25 TABLET ORAL at 21:47

## 2020-01-01 RX ADMIN — OXYCODONE HYDROCHLORIDE 15 MG: 15 TABLET ORAL at 20:02

## 2020-01-01 RX ADMIN — Medication 250 MG: at 22:41

## 2020-01-01 RX ADMIN — SODIUM CHLORIDE, PRESERVATIVE FREE 10 ML: 5 INJECTION INTRAVENOUS at 08:41

## 2020-01-01 RX ADMIN — HYDRALAZINE HYDROCHLORIDE 10 MG: 10 TABLET ORAL at 21:00

## 2020-01-01 RX ADMIN — HEPARIN SODIUM 5000 UNITS: 5000 INJECTION, SOLUTION INTRAVENOUS; SUBCUTANEOUS at 14:49

## 2020-01-01 RX ADMIN — METOPROLOL TARTRATE 100 MG: 100 TABLET ORAL at 08:52

## 2020-01-01 RX ADMIN — HEPARIN SODIUM 5000 UNITS: 5000 INJECTION, SOLUTION INTRAVENOUS; SUBCUTANEOUS at 05:24

## 2020-01-01 RX ADMIN — VANCOMYCIN HYDROCHLORIDE 125 MG: KIT at 18:32

## 2020-01-01 RX ADMIN — TERAZOSIN HYDROCHLORIDE 10 MG: 5 CAPSULE ORAL at 20:13

## 2020-01-01 RX ADMIN — SODIUM ZIRCONIUM CYCLOSILICATE 10 G: 10 POWDER, FOR SUSPENSION ORAL at 14:37

## 2020-01-01 RX ADMIN — ONDANSETRON 4 MG: 2 INJECTION INTRAMUSCULAR; INTRAVENOUS at 18:17

## 2020-01-01 RX ADMIN — MICONAZOLE NITRATE 1 APPLICATION: 2 CREAM TOPICAL at 20:58

## 2020-01-01 RX ADMIN — FENTANYL 1 PATCH: 12 PATCH, EXTENDED RELEASE TRANSDERMAL at 12:50

## 2020-01-01 RX ADMIN — HEPARIN SODIUM 5000 UNITS: 5000 INJECTION, SOLUTION INTRAVENOUS; SUBCUTANEOUS at 20:34

## 2020-01-01 RX ADMIN — INSULIN LISPRO 3 UNITS: 100 INJECTION, SOLUTION INTRAVENOUS; SUBCUTANEOUS at 17:58

## 2020-01-01 RX ADMIN — LIDOCAINE 1 PATCH: 50 PATCH CUTANEOUS at 08:26

## 2020-01-01 RX ADMIN — METRONIDAZOLE 500 MG: 500 INJECTION, SOLUTION INTRAVENOUS at 03:09

## 2020-01-01 RX ADMIN — QUETIAPINE FUMARATE 12.5 MG: 25 TABLET ORAL at 22:32

## 2020-01-01 RX ADMIN — HYDRALAZINE HYDROCHLORIDE 10 MG: 10 TABLET ORAL at 07:29

## 2020-01-01 RX ADMIN — VANCOMYCIN HYDROCHLORIDE 250 MG: KIT at 19:36

## 2020-01-01 RX ADMIN — HEPARIN SODIUM 5000 UNITS: 5000 INJECTION, SOLUTION INTRAVENOUS; SUBCUTANEOUS at 21:07

## 2020-01-01 RX ADMIN — METRONIDAZOLE 500 MG: 500 INJECTION, SOLUTION INTRAVENOUS at 09:12

## 2020-01-01 RX ADMIN — FENTANYL CITRATE 50 MCG: 50 INJECTION, SOLUTION INTRAMUSCULAR; INTRAVENOUS at 12:15

## 2020-01-01 RX ADMIN — INSULIN DETEMIR 5 UNITS: 100 INJECTION, SOLUTION SUBCUTANEOUS at 20:37

## 2020-01-01 RX ADMIN — INSULIN DETEMIR 10 UNITS: 100 INJECTION, SOLUTION SUBCUTANEOUS at 20:16

## 2020-01-01 RX ADMIN — DAPTOMYCIN 550 MG: 500 INJECTION, POWDER, LYOPHILIZED, FOR SOLUTION INTRAVENOUS at 12:10

## 2020-01-01 RX ADMIN — MEROPENEM 1 G: 1 INJECTION, POWDER, FOR SOLUTION INTRAVENOUS at 00:23

## 2020-01-01 RX ADMIN — VANCOMYCIN HYDROCHLORIDE 250 MG: KIT at 00:05

## 2020-01-01 RX ADMIN — Medication 250 MG: at 20:37

## 2020-01-01 RX ADMIN — METRONIDAZOLE 500 MG: 500 INJECTION, SOLUTION INTRAVENOUS at 07:30

## 2020-01-01 RX ADMIN — DOCUSATE SODIUM 50MG AND SENNOSIDES 8.6MG 2 TABLET: 8.6; 5 TABLET, FILM COATED ORAL at 08:11

## 2020-01-01 RX ADMIN — MICAFUNGIN SODIUM 100 MG: 100 INJECTION, POWDER, LYOPHILIZED, FOR SOLUTION INTRAVENOUS at 18:23

## 2020-01-01 RX ADMIN — METRONIDAZOLE 500 MG: 500 INJECTION, SOLUTION INTRAVENOUS at 01:21

## 2020-01-01 RX ADMIN — INSULIN LISPRO 3 UNITS: 100 INJECTION, SOLUTION INTRAVENOUS; SUBCUTANEOUS at 13:00

## 2020-01-01 RX ADMIN — GABAPENTIN 300 MG: 300 CAPSULE ORAL at 21:54

## 2020-01-01 RX ADMIN — FUROSEMIDE 40 MG: 10 INJECTION, SOLUTION INTRAMUSCULAR; INTRAVENOUS at 22:44

## 2020-01-01 RX ADMIN — AMLODIPINE BESYLATE 10 MG: 10 TABLET ORAL at 08:33

## 2020-01-01 RX ADMIN — QUETIAPINE FUMARATE 12.5 MG: 25 TABLET ORAL at 21:43

## 2020-01-01 RX ADMIN — TERAZOSIN HYDROCHLORIDE 10 MG: 5 CAPSULE ORAL at 21:43

## 2020-01-01 RX ADMIN — OXYCODONE HYDROCHLORIDE AND ACETAMINOPHEN 1 TABLET: 10; 325 TABLET ORAL at 15:34

## 2020-01-01 RX ADMIN — NYSTATIN 1 APPLICATION: 100000 CREAM TOPICAL at 08:42

## 2020-01-01 RX ADMIN — INSULIN DETEMIR 10 UNITS: 100 INJECTION, SOLUTION SUBCUTANEOUS at 22:36

## 2020-01-01 RX ADMIN — CASTOR OIL AND BALSAM, PERU: 788; 87 OINTMENT TOPICAL at 20:16

## 2020-01-01 RX ADMIN — QUETIAPINE FUMARATE 12.5 MG: 25 TABLET ORAL at 21:57

## 2020-01-01 RX ADMIN — VANCOMYCIN HYDROCHLORIDE 250 MG: KIT at 01:55

## 2020-01-01 RX ADMIN — OXYCODONE HYDROCHLORIDE AND ACETAMINOPHEN 1 TABLET: 10; 325 TABLET ORAL at 21:09

## 2020-01-01 RX ADMIN — SODIUM CHLORIDE, PRESERVATIVE FREE 10 ML: 5 INJECTION INTRAVENOUS at 22:08

## 2020-01-01 RX ADMIN — METRONIDAZOLE 500 MG: 500 TABLET ORAL at 13:50

## 2020-01-01 RX ADMIN — ONDANSETRON 4 MG: 2 INJECTION INTRAMUSCULAR; INTRAVENOUS at 19:36

## 2020-01-01 RX ADMIN — METRONIDAZOLE 500 MG: 500 INJECTION, SOLUTION INTRAVENOUS at 04:25

## 2020-01-01 RX ADMIN — METRONIDAZOLE 500 MG: 500 INJECTION, SOLUTION INTRAVENOUS at 15:30

## 2020-01-01 RX ADMIN — METOPROLOL TARTRATE 100 MG: 100 TABLET ORAL at 21:33

## 2020-01-01 RX ADMIN — HEPARIN SODIUM 5000 UNITS: 5000 INJECTION, SOLUTION INTRAVENOUS; SUBCUTANEOUS at 06:31

## 2020-01-01 RX ADMIN — HYDRALAZINE HYDROCHLORIDE 10 MG: 10 TABLET ORAL at 06:12

## 2020-01-01 RX ADMIN — MICONAZOLE NITRATE: 20 POWDER TOPICAL at 09:31

## 2020-01-01 RX ADMIN — VANCOMYCIN HYDROCHLORIDE 250 MG: KIT at 00:04

## 2020-01-01 RX ADMIN — MICONAZOLE NITRATE 1 APPLICATION: 2 CREAM TOPICAL at 11:44

## 2020-01-01 RX ADMIN — DULOXETINE HYDROCHLORIDE 60 MG: 60 CAPSULE, DELAYED RELEASE ORAL at 16:32

## 2020-01-01 RX ADMIN — LORAZEPAM 2 MG: 1 TABLET ORAL at 20:02

## 2020-01-01 RX ADMIN — METOPROLOL TARTRATE 100 MG: 100 TABLET ORAL at 21:43

## 2020-01-01 RX ADMIN — HEPARIN SODIUM 5000 UNITS: 5000 INJECTION, SOLUTION INTRAVENOUS; SUBCUTANEOUS at 21:09

## 2020-01-01 RX ADMIN — HEPARIN SODIUM 5000 UNITS: 5000 INJECTION, SOLUTION INTRAVENOUS; SUBCUTANEOUS at 09:49

## 2020-01-01 RX ADMIN — SODIUM CHLORIDE, PRESERVATIVE FREE 10 ML: 5 INJECTION INTRAVENOUS at 09:00

## 2020-01-01 RX ADMIN — MICONAZOLE NITRATE: 2 CREAM TOPICAL at 08:45

## 2020-01-01 RX ADMIN — OXYCODONE HYDROCHLORIDE AND ACETAMINOPHEN 1 TABLET: 10; 325 TABLET ORAL at 20:37

## 2020-01-01 RX ADMIN — INSULIN LISPRO 2 UNITS: 100 INJECTION, SOLUTION INTRAVENOUS; SUBCUTANEOUS at 11:33

## 2020-01-01 RX ADMIN — FUROSEMIDE 80 MG: 10 INJECTION, SOLUTION INTRAMUSCULAR; INTRAVENOUS at 05:04

## 2020-01-01 RX ADMIN — INSULIN DETEMIR 18 UNITS: 100 INJECTION, SOLUTION SUBCUTANEOUS at 22:44

## 2020-01-01 RX ADMIN — HEPARIN SODIUM 5000 UNITS: 5000 INJECTION, SOLUTION INTRAVENOUS; SUBCUTANEOUS at 23:13

## 2020-01-01 RX ADMIN — LIDOCAINE 1 PATCH: 50 PATCH CUTANEOUS at 09:12

## 2020-01-01 RX ADMIN — MEROPENEM 1 G: 1 INJECTION, POWDER, FOR SOLUTION INTRAVENOUS at 00:04

## 2020-01-01 RX ADMIN — FUROSEMIDE 40 MG: 40 TABLET ORAL at 08:19

## 2020-01-01 RX ADMIN — ASPIRIN 81 MG: 81 TABLET, COATED ORAL at 10:29

## 2020-01-01 RX ADMIN — TERAZOSIN HYDROCHLORIDE 10 MG: 5 CAPSULE ORAL at 21:49

## 2020-01-01 RX ADMIN — DULOXETINE HYDROCHLORIDE 60 MG: 60 CAPSULE, DELAYED RELEASE ORAL at 09:58

## 2020-01-01 RX ADMIN — INSULIN LISPRO 3 UNITS: 100 INJECTION, SOLUTION INTRAVENOUS; SUBCUTANEOUS at 13:07

## 2020-01-01 RX ADMIN — MICAFUNGIN SODIUM 100 MG: 100 INJECTION, POWDER, LYOPHILIZED, FOR SOLUTION INTRAVENOUS at 17:44

## 2020-01-01 RX ADMIN — LIDOCAINE HYDROCHLORIDE: 20 JELLY TOPICAL at 19:37

## 2020-01-01 RX ADMIN — MICONAZOLE NITRATE 1 APPLICATION: 2 CREAM TOPICAL at 10:02

## 2020-01-01 RX ADMIN — INSULIN DETEMIR 5 UNITS: 100 INJECTION, SOLUTION SUBCUTANEOUS at 09:26

## 2020-01-01 RX ADMIN — METRONIDAZOLE 500 MG: 500 INJECTION, SOLUTION INTRAVENOUS at 16:22

## 2020-01-01 RX ADMIN — AMPICILLIN SODIUM AND SULBACTAM SODIUM 3 G: 2; 1 INJECTION, POWDER, FOR SOLUTION INTRAMUSCULAR; INTRAVENOUS at 08:29

## 2020-01-01 RX ADMIN — ENOXAPARIN SODIUM 40 MG: 40 INJECTION SUBCUTANEOUS at 08:43

## 2020-01-01 RX ADMIN — TERAZOSIN HYDROCHLORIDE 10 MG: 5 CAPSULE ORAL at 22:33

## 2020-01-01 RX ADMIN — OXYCODONE 5 MG: 5 TABLET ORAL at 11:39

## 2020-01-01 RX ADMIN — OXYCODONE 5 MG: 5 TABLET ORAL at 20:06

## 2020-01-01 RX ADMIN — INSULIN DETEMIR 20 UNITS: 100 INJECTION, SOLUTION SUBCUTANEOUS at 20:08

## 2020-01-01 RX ADMIN — FENTANYL 1 PATCH: 12.5 PATCH TRANSDERMAL at 10:32

## 2020-01-01 RX ADMIN — SODIUM CHLORIDE, PRESERVATIVE FREE 10 ML: 5 INJECTION INTRAVENOUS at 08:46

## 2020-01-01 RX ADMIN — METOPROLOL TARTRATE 50 MG: 50 TABLET, FILM COATED ORAL at 12:23

## 2020-01-01 RX ADMIN — MEROPENEM 1 G: 1 INJECTION, POWDER, FOR SOLUTION INTRAVENOUS at 17:43

## 2020-01-01 RX ADMIN — MEROPENEM 1 G: 1 INJECTION, POWDER, FOR SOLUTION INTRAVENOUS at 02:26

## 2020-01-01 RX ADMIN — OXYCODONE HYDROCHLORIDE AND ACETAMINOPHEN 1 TABLET: 10; 325 TABLET ORAL at 16:59

## 2020-01-01 RX ADMIN — METOPROLOL TARTRATE 100 MG: 100 TABLET ORAL at 08:54

## 2020-01-01 RX ADMIN — SODIUM CHLORIDE, PRESERVATIVE FREE 10 ML: 5 INJECTION INTRAVENOUS at 09:52

## 2020-01-01 RX ADMIN — INSULIN LISPRO 2 UNITS: 100 INJECTION, SOLUTION INTRAVENOUS; SUBCUTANEOUS at 10:39

## 2020-01-01 RX ADMIN — HEPARIN SODIUM 5000 UNITS: 5000 INJECTION, SOLUTION INTRAVENOUS; SUBCUTANEOUS at 21:49

## 2020-01-01 RX ADMIN — ASPIRIN 81 MG: 81 TABLET, COATED ORAL at 08:02

## 2020-01-01 RX ADMIN — FUROSEMIDE 60 MG: 10 INJECTION, SOLUTION INTRAMUSCULAR; INTRAVENOUS at 10:16

## 2020-01-01 RX ADMIN — LORAZEPAM 1 MG: 2 INJECTION INTRAMUSCULAR; INTRAVENOUS at 20:50

## 2020-01-01 RX ADMIN — NYSTATIN: 100000 POWDER TOPICAL at 08:57

## 2020-01-01 RX ADMIN — METOPROLOL TARTRATE 100 MG: 100 TABLET, FILM COATED ORAL at 08:58

## 2020-01-01 RX ADMIN — FUROSEMIDE 40 MG: 40 TABLET ORAL at 09:52

## 2020-01-01 RX ADMIN — CEFTRIAXONE 2 G: 2 INJECTION, POWDER, FOR SOLUTION INTRAMUSCULAR; INTRAVENOUS at 10:10

## 2020-01-01 RX ADMIN — OXYCODONE 5 MG: 5 TABLET ORAL at 18:54

## 2020-01-01 RX ADMIN — QUETIAPINE FUMARATE 12.5 MG: 25 TABLET ORAL at 20:35

## 2020-01-01 RX ADMIN — INSULIN LISPRO 3 UNITS: 100 INJECTION, SOLUTION INTRAVENOUS; SUBCUTANEOUS at 12:00

## 2020-01-01 RX ADMIN — INSULIN LISPRO 2 UNITS: 100 INJECTION, SOLUTION INTRAVENOUS; SUBCUTANEOUS at 18:03

## 2020-01-01 RX ADMIN — TETANUS TOXOID, REDUCED DIPHTHERIA TOXOID AND ACELLULAR PERTUSSIS VACCINE, ADSORBED 0.5 ML: 5; 2.5; 8; 8; 2.5 SUSPENSION INTRAMUSCULAR at 01:01

## 2020-01-01 RX ADMIN — HYDRALAZINE HYDROCHLORIDE 50 MG: 50 TABLET, FILM COATED ORAL at 06:29

## 2020-01-01 RX ADMIN — ONDANSETRON 4 MG: 2 INJECTION INTRAMUSCULAR; INTRAVENOUS at 23:05

## 2020-01-01 RX ADMIN — HYDRALAZINE HYDROCHLORIDE 10 MG: 10 TABLET ORAL at 05:26

## 2020-01-01 RX ADMIN — INSULIN LISPRO 3 UNITS: 100 INJECTION, SOLUTION INTRAVENOUS; SUBCUTANEOUS at 08:34

## 2020-01-01 RX ADMIN — TERAZOSIN HYDROCHLORIDE 10 MG: 5 CAPSULE ORAL at 22:34

## 2020-01-01 RX ADMIN — OXYCODONE HYDROCHLORIDE AND ACETAMINOPHEN 1 TABLET: 5; 325 TABLET ORAL at 06:10

## 2020-01-01 RX ADMIN — MEROPENEM 1 G: 1 INJECTION, POWDER, FOR SOLUTION INTRAVENOUS at 11:47

## 2020-01-01 RX ADMIN — HEPARIN SODIUM 5000 UNITS: 5000 INJECTION INTRAVENOUS; SUBCUTANEOUS at 05:50

## 2020-01-01 RX ADMIN — METRONIDAZOLE 500 MG: 500 INJECTION, SOLUTION INTRAVENOUS at 08:49

## 2020-01-01 RX ADMIN — Medication 250 MG: at 08:54

## 2020-01-01 RX ADMIN — TERAZOSIN HYDROCHLORIDE 10 MG: 5 CAPSULE ORAL at 21:48

## 2020-01-01 RX ADMIN — FUROSEMIDE 40 MG: 10 INJECTION, SOLUTION INTRAMUSCULAR; INTRAVENOUS at 12:49

## 2020-01-01 RX ADMIN — DULOXETINE HYDROCHLORIDE 60 MG: 60 CAPSULE, DELAYED RELEASE ORAL at 09:16

## 2020-01-01 RX ADMIN — VANCOMYCIN HYDROCHLORIDE 125 MG: KIT at 23:17

## 2020-01-01 RX ADMIN — IPRATROPIUM BROMIDE AND ALBUTEROL SULFATE 3 ML: 2.5; .5 SOLUTION RESPIRATORY (INHALATION) at 12:37

## 2020-01-01 RX ADMIN — OXYCODONE HYDROCHLORIDE AND ACETAMINOPHEN 1 TABLET: 10; 325 TABLET ORAL at 10:37

## 2020-01-01 RX ADMIN — MICAFUNGIN SODIUM 100 MG: 100 INJECTION, POWDER, LYOPHILIZED, FOR SOLUTION INTRAVENOUS at 22:42

## 2020-01-01 RX ADMIN — AMPICILLIN SODIUM AND SULBACTAM SODIUM 3 G: 2; 1 INJECTION, POWDER, FOR SOLUTION INTRAMUSCULAR; INTRAVENOUS at 02:02

## 2020-01-01 RX ADMIN — AMPICILLIN SODIUM AND SULBACTAM SODIUM 3 G: 2; 1 INJECTION, POWDER, FOR SOLUTION INTRAMUSCULAR; INTRAVENOUS at 04:07

## 2020-01-01 RX ADMIN — GABAPENTIN 100 MG: 100 CAPSULE ORAL at 08:43

## 2020-01-01 RX ADMIN — VANCOMYCIN HYDROCHLORIDE 250 MG: KIT at 17:01

## 2020-01-01 RX ADMIN — HEPARIN SODIUM 5000 UNITS: 5000 INJECTION, SOLUTION INTRAVENOUS; SUBCUTANEOUS at 06:56

## 2020-01-01 RX ADMIN — METOPROLOL TARTRATE 100 MG: 100 TABLET, FILM COATED ORAL at 20:02

## 2020-01-01 RX ADMIN — INSULIN LISPRO 2 UNITS: 100 INJECTION, SOLUTION INTRAVENOUS; SUBCUTANEOUS at 18:15

## 2020-01-01 RX ADMIN — SODIUM CHLORIDE, PRESERVATIVE FREE 10 ML: 5 INJECTION INTRAVENOUS at 21:12

## 2020-01-01 RX ADMIN — METOPROLOL TARTRATE 100 MG: 100 TABLET ORAL at 22:07

## 2020-01-01 RX ADMIN — AMPICILLIN SODIUM AND SULBACTAM SODIUM 3 G: 2; 1 INJECTION, POWDER, FOR SOLUTION INTRAMUSCULAR; INTRAVENOUS at 08:42

## 2020-01-01 RX ADMIN — INSULIN LISPRO 6 UNITS: 100 INJECTION, SOLUTION INTRAVENOUS; SUBCUTANEOUS at 17:26

## 2020-01-01 RX ADMIN — FUROSEMIDE 40 MG: 10 INJECTION, SOLUTION INTRAMUSCULAR; INTRAVENOUS at 08:43

## 2020-01-01 RX ADMIN — VANCOMYCIN HYDROCHLORIDE 1500 MG: 10 INJECTION, POWDER, LYOPHILIZED, FOR SOLUTION INTRAVENOUS at 17:09

## 2020-01-01 RX ADMIN — FUROSEMIDE 40 MG: 40 TABLET ORAL at 09:58

## 2020-01-01 RX ADMIN — LIDOCAINE 1 PATCH: 50 PATCH CUTANEOUS at 08:17

## 2020-01-01 RX ADMIN — CASTOR OIL AND BALSAM, PERU: 788; 87 OINTMENT TOPICAL at 08:37

## 2020-01-01 RX ADMIN — VANCOMYCIN HYDROCHLORIDE 250 MG: KIT at 23:21

## 2020-01-01 RX ADMIN — METRONIDAZOLE 500 MG: 500 INJECTION, SOLUTION INTRAVENOUS at 13:50

## 2020-01-01 RX ADMIN — OXYCODONE HYDROCHLORIDE AND ACETAMINOPHEN 1 TABLET: 10; 325 TABLET ORAL at 09:13

## 2020-01-01 RX ADMIN — LORAZEPAM 0.25 MG: 2 INJECTION INTRAMUSCULAR; INTRAVENOUS at 12:37

## 2020-01-01 RX ADMIN — TAZOBACTAM SODIUM AND PIPERACILLIN SODIUM 3.38 G: 375; 3 INJECTION, SOLUTION INTRAVENOUS at 12:34

## 2020-01-01 RX ADMIN — OXYCODONE 5 MG: 5 TABLET ORAL at 22:26

## 2020-01-01 RX ADMIN — VANCOMYCIN HYDROCHLORIDE 250 MG: KIT at 17:45

## 2020-01-01 RX ADMIN — OXYCODONE HYDROCHLORIDE AND ACETAMINOPHEN 1 TABLET: 10; 325 TABLET ORAL at 10:25

## 2020-01-01 RX ADMIN — MICAFUNGIN SODIUM 100 MG: 100 INJECTION, POWDER, LYOPHILIZED, FOR SOLUTION INTRAVENOUS at 21:53

## 2020-01-01 RX ADMIN — SODIUM CHLORIDE, PRESERVATIVE FREE 10 ML: 5 INJECTION INTRAVENOUS at 21:50

## 2020-01-01 RX ADMIN — HEPARIN SODIUM 5000 UNITS: 5000 INJECTION, SOLUTION INTRAVENOUS; SUBCUTANEOUS at 15:12

## 2020-01-01 RX ADMIN — MICONAZOLE NITRATE 1 APPLICATION: 2 CREAM TOPICAL at 22:50

## 2020-01-01 RX ADMIN — INSULIN LISPRO 3 UNITS: 100 INJECTION, SOLUTION INTRAVENOUS; SUBCUTANEOUS at 12:08

## 2020-01-06 NOTE — TELEPHONE ENCOUNTER
Ayaka, a physical therapist with [facility], called with an update. States patient is experiencing pain in his right leg and his BP is 140/100.     It looks as though patient has a hospital follow up scheduled today at 1:15. Ayaka would like a call back to update clinical staff beforehand.     Please call Ayaka back: [phone]

## 2020-01-24 NOTE — TELEPHONE ENCOUNTER
Raymond from [facility] wanted to alert office the patient no longer wants physical therapy services so he is discharging the patient from physical therapy, Raymond also stated the OT personnel should have called yesterday also to say they were stopping occupational therapy too. Patient still wants nursing care however so that is continuing. Any questions Raymond can be reached at [phone]

## 2020-05-15 NOTE — TELEPHONE ENCOUNTER
Mr. Tipton called to request a sooner appointment due to right ankle ulcer that is draining blood and has a odor. He was scheduled on 3/27/20 and 5/13/20 for this problem and rescheduled these appointments. He is now scheduled for 6/9/20.   I was hesitant in making the appointment on 5/19/20 due to you and Jewels having a total of 24 patients already scheduled.  Please advise.

## 2020-05-18 NOTE — TELEPHONE ENCOUNTER
You are in [place]... Next Tuesday.   There was a cancellation on [facility] schedule tomorrow 5/19/20, so he was scheduled at that date. Mrs Tipton called and stated that the ulcer is now the size of a grapefruit on the side of his ankle and has turned black. He will be here tomorrow 5/19/20.    Carlyle

## 2020-05-19 NOTE — PROGRESS NOTES
[facility] Cardiothoracic Surgery Follow-Up Note    Name:  Kendrick Crystal  MRN Number:  [id number]  Date of Encounter:  05/19/2020    Referred By:  No ref. provider found  PCP:  Stanley Carrasco DO    Chief Complaint:    Chief Complaint   Patient presents with   • Follow-up     Eval Right Lower Extremity Ankle Area Ulcer       History of Present Illness:    Mr. Kendrick Crystal is a pleasant 49 y.o. male with a history of hypertension, hyperlipidemia, type 2 diabetes mellitus, left below the knee amputation 3/2/2017, and cellulitis/diabetic ulcer of the right anterior shin who returns the office today for follow-up exam.  The patient was seen in our office on 3/5/2019 and since that time has developed another ulcer on his right shin that has been sent for several months.  Approximately 1 month ago he developed a ulcer that encompasses most of his outer right ankle.  He returns the office today for evaluation of these new lesions.    Review of Systems:  Review of Systems   Constitution: Positive for malaise/fatigue. Negative for chills, fever, night sweats and weight loss.   HENT: Negative for hearing loss, odynophagia and sore throat.    Eyes: Positive for vision loss in left eye and vision loss in right eye.   Cardiovascular: Negative for chest pain, dyspnea on exertion, leg swelling, orthopnea and palpitations.   Respiratory: Negative for cough and hemoptysis.    Endocrine: Negative for cold intolerance, heat intolerance, polydipsia, polyphagia and polyuria.   Hematologic/Lymphatic: Bruises/bleeds easily.   Skin: Positive for poor wound healing. Negative for itching and rash.   Musculoskeletal: Positive for arthritis. Negative for joint pain, joint swelling and myalgias.   Gastrointestinal: Positive for constipation and diarrhea. Negative for abdominal pain, hematemesis, hematochezia, melena, nausea and vomiting.   Genitourinary: Negative for dysuria, frequency and hematuria.   Neurological: Negative  for focal weakness, headaches, numbness and seizures.   Psychiatric/Behavioral: Negative for suicidal ideas.   All other systems reviewed and are negative.      Past Medical History:    Past Medical History:   Diagnosis Date   • Abdominal wall cellulitis 5/20/2019   • JUAN (acute kidney injury) (CMS/HCC) 7/29/2018   • Arthritis    • Bipolar 1 disorder (CMS/HCC)    • Cellulitis of right anterior lower leg 07/29/2018    WITH MRSA   • Cirrhosis (CMS/HCC)    • Counseling for insulin pump    • Depression    • Diabetes mellitus (CMS/HCC)    • Encephalopathy, hepatic (CMS/HCC)    • H/O degenerative disc disease    • History of Lissa's gangrene     right leg/testicle   • Hyperlipemia    • Hypertension    • multiple Skin abscesses    • DUNLAP, bx showed stage IV fibrosis    • Neuropathy    • Peripheral vascular disease (CMS/HCC)    • Thrombophlebitis        Past Surgical History:    Past Surgical History:   Procedure Laterality Date   • ABDOMINAL WALL ABSCESS INCISION AND DRAINAGE N/A 4/26/2019    Procedure: ABDOMINAL WALL DEBRIDEMENT;  Surgeon: Fadi Kern MD;  Location:  [facility];  Service: General   • AMPUTATION DIGIT Left 1/30/2017    Procedure: left fourth and fifth transmetatarsal toe amputation ;  Surgeon: Juancho Martinez MD;  Location:  [facility];  Service:    • BELOW KNEE AMPUTATION Left 3/2/2017    Procedure: AMPUTATION BELOW KNEE, SHMUEL;  Surgeon: Juancho Martinez MD;  Location:  [facility];  Service:    • CYSTOSCOPY N/A 10/16/2019    Procedure: CYSTOSCOPY;  Surgeon: Brad Gutierres MD;  Location:  [facility];  Service: Urology   • ENDOSCOPY     • HEMORRHOIDECTOMY N/A 8/2/2019    Procedure: EXCISION AND DRAIN PERIRECTAL ABSCESS;  Surgeon: Mumtaz Payne MD;  Location:  [facility];  Service: General   • LEG SURGERY     • TESTICLE SURGERY Right     DEBRIDEMENT FROM GANGRENE   • TONSILLECTOMY  1975       Patient Active Problem List   Diagnosis   • 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)   • S/P BKA (below knee amputation), left (CMS/[facility])   • Peripheral vascular disease (CMS/[facility])   • Obesity (BMI 30-39.9)   • Elevated troponin   • Acute UTI (urinary tract infection)   • Pneumaturia   • Gastroparesis due to secondary diabetes (CMS/[facility])   • History of Clostridioides difficile colitis   • Bipolar disorder (CMS/[facility])   • Acute respiratory failure with hypoxia (CMS/[facility])   • Acute on chronic diastolic congestive heart failure (CMS/[facility])   • Pleural effusion due to CHF (congestive heart failure) (CMS/[facility])   • Medical non-compliance   • Self neglect     Social History     Tobacco Use   • Smoking status: Current Some Day Smoker     Years: 10.00     Types: Cigars   • Smokeless tobacco: Never Used   • Tobacco comment: 1 Cigar Montly    Substance Use Topics   • Alcohol use: No   • Drug use: No     Family History   Problem Relation Age of Onset   • Arthritis Mother    • Hypertension Mother    • Kidney disease Mother    • Stroke Mother    • Heart attack Father    • Arthritis Father    • Diabetes Father    • Hyperlipidemia Father    • Hypertension Father    • Mental illness Sister    • Diabetes Brother    • Hypertension Brother    • Obesity Brother        Medications:      Current Outpatient Medications:   •  amLODIPine (NORVASC) 10 MG tablet, Take 1 tablet by mouth Daily., Disp: 90 tablet, Rfl: 3  •  aspirin 81 MG tablet, Take 1 tablet by mouth Daily., Disp: 30 tablet, Rfl: 11  •  DULoxetine (CYMBALTA) 60 MG capsule, Take 1 capsule by mouth Daily., Disp: 90 capsule, Rfl: 3  •  furosemide (LASIX) 40 MG tablet, Take 1 tablet by mouth Daily., Disp: , Rfl:   •  hydrALAZINE (APRESOLINE) 50 MG tablet, Take 1 tablet by mouth Every 8 (Eight) Hours., Disp: , Rfl:   •   "Insulin Glargine (LANTUS SOLOSTAR) 100 UNIT/ML injection pen, Inject 20 Units under the skin into the appropriate area as directed Every 12 (Twelve) Hours., Disp: 21 mL, Rfl: 0  •  Insulin Lispro, 1 Unit Dial, (HUMALOG) 100 UNIT/ML solution pen-injector, Inject 4 Units under the skin into the appropriate area as directed 3 (Three) Times a Day With Meals., Disp: 6 mL, Rfl: 5  •  melatonin 5 MG tablet tablet, Take 1 tablet by mouth At Night As Needed (sleep)., Disp: , Rfl:   •  metoprolol tartrate (LOPRESSOR) 100 MG tablet, Take 1 tablet by mouth Every 12 (Twelve) Hours., Disp: 180 tablet, Rfl: 3  •  nystatin (MYCOSTATIN) 714215 UNIT/GM cream, Apply  topically to the appropriate area as directed 2 (Two) Times a Day., Disp: 90 g, Rfl: 3  •  terazosin (HYTRIN) 5 MG capsule, Take 1 capsule by mouth Every Night., Disp: 90 capsule, Rfl: 3    Allergies:  No Known Allergies    Physical Exam:  Vital Signs:    Vitals:    05/19/20 1106   BP: 120/80   BP Location: Left arm   Patient Position: Sitting   Pulse: 96   Temp: 97.5 °F (36.4 °C)   SpO2: 99%   Weight: 107 kg (235 lb)   Height: 190.5 cm (75\")       Physical Exam   Gen- NAD, pleasant, cooperative  CV- Regular rate and rhythm, no murmur, gallop or rub  Pulm- Clear to auscultation bilateral without wheeze or rhonchi  GI- Soft, normoactive bowel sounds, non-tender  Ext- With moderate edema of the right lower extremity, left lower extremity not present  Wounds- right lower extremity scabbed ulceration on shin measuring approximately 0.5 cm, and ulceration encompassing the patient's right outer ankle with necrotic eschar noted in the wound bed.    Neuro- CN II- XII grossly intact, tongue midline, voice normal.    Labs/Imaging:  No imaging was obtained in the office today.    Assessment / Plan:  Mr. Kendrick Crystal is a pleasant 49 y.o. male with a history of hypertension, hyperlipidemia, type 2 diabetes mellitus, left below the knee amputation 3/2/2017, and cellulitis/diabetic " ulcer of the right anterior shin who returns the office today for follow-up exam.  The patient was seen in our office on 3/5/2019 and since that time has developed another ulcer on his right shin that has been sent for several months.  Approximately 1 month ago he developed a ulcer that encompasses most of his outer right ankle.  Dr. Martinez, Dr. Arriola and I evaluated the patient's knee wounds.  Dr. Martinez and Dr. Arriola concurred the patient would need surgical debridement of the wound.  The patient does have dopplerable pulses in his right DP and PT with   weaker but present AT pulse.  Both Dr. Arriola and Dr. Martinez agreed that attempt should be made to save the leg.  At this time, we will send the patient for preadmission testing and plan on a surgical debridement in approximately 2-3 days.  Should the patient be questions or concerns of the interval, he may contact the office.    Please note, this document was produced using voice recognition software.    CHINTAN Grande  [facility] Cardiothoracic Surgery

## 2020-05-19 NOTE — PAT
morro calderon notified of glucose, a1c, wbc, potassium and mrsa history, ordered for patient to follow up with patient today in regards to his blood sugar and for patient to call surgeons office with update, patient and spouse  notified and verbalized understanding.

## 2020-05-19 NOTE — PAT
Patient to apply Chlorhexadine wipes  to surgical area (as instructed) the night before procedure and the AM of procedure. Wipes provided.  Blood bank bracelet applied to patient during Pre Admission Testing visit.  Patient instructed not to remove from arm until after procedure and they are discharged from the hospital.  Explained to patient that they may shower and get the bracelet wet, but not to immerse under water for longer periods (bathing, swimming, hand dishwashing, etc).  Patient verbalized understanding.

## 2020-05-19 NOTE — DISCHARGE INSTRUCTIONS
The following information and instructions were given:    Do not eat, drink, smoke or chew gum after midnight the night before surgery. This includes no mints.  Take all routine, prescribed medications including heart and blood pressure medicines with a sip of water unless otherwise instructed by your physician.   Do NOT take diabetic medication unless instructed by your physician.    DO NOT shave for two days before your procedure.  Do not wear makeup.      DO NOT wear fingernail polish (gel/regular) and/or acrylic/artificial nails on the day of surgery.   If you had a recent manicure and would rather not remove polish or artificial nails, the minimum requirement is that the polish/artificial nails must be removed from the middle finger on each hand.      If you are having surgery/procedure on an upper extremity, fingernail polish/artificial fingernails must be removed for surgery.  NO EXCEPTIONS.      If you are having surgery/procedure on a lower extremity, toenail polish on both extremities must be removed for surgery.  NO EXCEPTIONS.    Remove all jewelry (advise to go to jeweler if unable to remove).  Jewelry, especially rings, can no longer be taped for surgery.    Leave anything you consider valuable at home.    Leave your suitcase in the car until after your surgery.    Bring the following with you the day of your procedure (when applicable):       -Picture ID and insurance cards       -Co-pay/deductible required by insurance       -Medications in the original bottles (not a list) including all over-the-counter medications if not brought to PAT       -Copy of advance directive, living will or power of  documents if not brought to PAT       -CPAP or BIPAP mask and tubing (do not bring machine)       -Skin prep instruction(s) sheet       -PAT Pass    Education booklet, brochure, handout or binder related to procedure given to patient.  Education booklet also includes general information related to  their recovery that mentions signs and symptoms of infection and when to call the doctor.    When applicable, an ERAS handout/booklet was given to patient.    Pain Control After Surgery handout given to patient.    Respirex use (handout given to patient) and pneumonia prevention education provided.    Signs and Symptoms of infection discussed with patient in Pre Admission Testing.  Patient instructed to call their doctor if any of the following symptoms are noted during recovery:  Fever of 100.4 F or higher, incision that is warm or has increasing bleeding, redness or drainage.    DVT Prevention instructions given verbally during Pre Admission Testing appointment that stress the importance of ambulation to improve blood circulation.  Also encouraged patient to perform foot exercises when in bed and application of a sequential device may be applied to lower extremities to improve circulation.      Please apply Chlorhexidine wipes to surgical area (if instructed) the night before procedure and the AM of procedure and document date/time of applications on skin prep instruction sheet.

## 2020-05-21 NOTE — TELEPHONE ENCOUNTER
"Mr. Crystal was scheduled for surgery with Dr. Martinez today and did not come to the hospital for the time given for his arrival. I called and s/w his wife Cindy to see if he still plans on coming to the hospital. Cindy stated \"No, he got up this morning and stated that he was not coming to this surgery, he is afraid he is going to lose his leg and is wanting to put off this surgery as long as possible.\" Mr. Crystal had called on 05/20 with some concerns and per Dr. Martinez, he really needed to have this surgery done sooner rather than later to avoid possible sepsis. I asked the wife if he would at lest let us r/s the surgery and she said no that Mr. Crystal was pretty determined no to have this done. I have relayed this information to Dr. Martinez.   "

## 2020-05-27 NOTE — ANESTHESIA PREPROCEDURE EVALUATION
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   Substance History - negative use     OB/GYN negative ob/gyn ROS         Other   arthritis,                      Anesthesia Plan    ASA 4     general     intravenous induction     Anesthetic plan, all risks, benefits, and alternatives have been provided, discussed and informed consent has been obtained with: patient.    Plan discussed with CRNA.

## 2020-05-27 NOTE — ANESTHESIA POSTPROCEDURE EVALUATION
Patient: Kendrick Crystal    Procedure Summary     Date:  05/27/20 Room / Location:   [facility]    Anesthesia Start:  1236 Anesthesia Stop:      Procedure:  LOWER EXTREMITY DEBRIDEMENT RIGHT (Right ) Diagnosis:       Diabetic ulcer of right lower leg (CMS/HCC)      (Diabetic ulcer of right lower leg (CMS/HCC) [E11.622, L97.919])    Surgeon:  Juancho Martinez MD Provider:  Guillermo Ann MD    Anesthesia Type:  general ASA Status:  4          Anesthesia Type: general    Vitals  Vitals Value Taken Time   BP     Temp     Pulse 91 5/27/2020  1:26 PM   Resp     SpO2 97 % 5/27/2020  1:26 PM   Vitals shown include unvalidated device data.    RR 10   T 97.9  /89    Post Anesthesia Care and Evaluation    Patient location during evaluation: PACU  Patient participation: waiting for patient participation  Level of consciousness: lethargic  Pain score: 0  Pain management: adequate  Airway patency: patent  Anesthetic complications: No anesthetic complications  PONV Status: none  Cardiovascular status: hemodynamically stable and acceptable  Respiratory status: nonlabored ventilation, acceptable, nasal cannula and oral airway  Hydration status: acceptable

## 2020-05-27 NOTE — ANESTHESIA PROCEDURE NOTES
Airway  Urgency: elective    Date/Time: 5/27/2020 12:40 PM  Airway not difficult    General Information and Staff    Patient location during procedure: OR  CRNA: Reggie Morales CRNA    Indications and Patient Condition  Indications for airway management: airway protection    Preoxygenated: yes  Mask difficulty assessment: 0 - not attempted    Final Airway Details  Final airway type: supraglottic airway      Successful airway: I-gel  Size 4    Number of attempts at approach: 1  Assessment: lips, teeth, and gum same as pre-op    Additional Comments  LMA placed without difficulty, ventilation with assist, equal breath sounds and symmetric chest rise and fall

## 2020-05-28 NOTE — TELEPHONE ENCOUNTER
Wife says he was supposed to have Vancomycin called in to his pharmacy, but they don't have anything.  I see Vanco intravenous listed in his meds 5/27, but she says he doesn't have a picc line?

## 2020-06-03 NOTE — TELEPHONE ENCOUNTER
Please tell patient to make sure he takes his medication today and follow up in office tomorrow. He should be on 3 different BP meds, if he needs a refill I can send them in.

## 2020-06-03 NOTE — TELEPHONE ENCOUNTER
Contacted patient and he stated he took his BP meds today while the nurse was there and his blood pressure improved. He stated that he does not need any refills     I advised him to try to take his medicine the same time everyday and he verbalized understanding

## 2020-06-08 PROBLEM — N41.2 PROSTATE ABSCESS: Status: ACTIVE | Noted: 2020-01-01

## 2020-06-08 PROBLEM — N41.2 PROSTATIC ABSCESS: Status: ACTIVE | Noted: 2020-01-01

## 2020-06-08 PROBLEM — E11.10 DIABETIC KETOACIDOSIS WITHOUT COMA ASSOCIATED WITH TYPE 2 DIABETES MELLITUS (HCC): Status: ACTIVE | Noted: 2020-01-01

## 2020-06-08 PROBLEM — S81.801A WOUND OF RIGHT LEG: Status: ACTIVE | Noted: 2020-01-01
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
## 2020-06-08 PROBLEM — N41.0 ACUTE PROSTATITIS: Status: ACTIVE | Noted: 2020-01-01

## 2020-06-08 PROBLEM — I50.32 DIASTOLIC CHF, CHRONIC (HCC): Status: ACTIVE | Noted: 2019-11-18

## 2020-06-08 PROBLEM — M79.652 LEFT THIGH PAIN: Status: ACTIVE | Noted: 2020-01-01

## 2020-06-08 PROBLEM — L02.91 ABSCESS: Status: ACTIVE | Noted: 2020-01-01

## 2020-06-08 PROBLEM — N41.9 PROSTATITIS: Status: ACTIVE | Noted: 2020-01-01

## 2020-06-11 NOTE — ANESTHESIA PREPROCEDURE EVALUATION
Anesthesia Evaluation     Patient summary reviewed and Nursing notes reviewed   NPO Solid Status: > 8 hours  NPO Liquid Status: > 8 hours           Airway   Mallampati: I  TM distance: >3 FB  Neck ROM: full  Dental    (+) edentulous    Pulmonary - normal exam   Cardiovascular   Exercise tolerance: poor (<4 METS)    Rhythm: regular  Rate: normal        Neuro/Psych  GI/Hepatic/Renal/Endo      Musculoskeletal     Abdominal    Substance History      OB/GYN          Other                        Anesthesia Plan    ASA 3     MAC

## 2020-06-11 NOTE — ANESTHESIA POSTPROCEDURE EVALUATION
Patient: Kendrick Crystal    Procedure Summary     Date:  06/11/20 Room / Location:   [facility]    Anesthesia Start:  1439 Anesthesia Stop:  1500    Procedure:  SIGMOIDOSCOPY FLEXIBLE (N/A ) Diagnosis:      Surgeon:  Mumtaz Payne MD Provider:  Guillermo Ann MD    Anesthesia Type:  MAC ASA Status:  3          Anesthesia Type: MAC    Vitals  Vitals Value Taken Time   /75 6/11/2020  3:00 PM   Temp 98 °F (36.7 °C) 6/11/2020  3:00 PM   Pulse 72 6/11/2020  3:00 PM   Resp 14 6/11/2020  3:00 PM   SpO2 100 % 6/11/2020  3:00 PM           Post Anesthesia Care and Evaluation    Patient location during evaluation: bedside (contact patient, recovered in endoscopy suite)  Patient participation: complete - patient participated  Level of consciousness: awake and alert  Pain score: 0  Pain management: adequate  Airway patency: patent  Anesthetic complications: No anesthetic complications  PONV Status: none  Cardiovascular status: hemodynamically stable and acceptable  Respiratory status: nonlabored ventilation, acceptable and nasal cannula  Hydration status: acceptable

## 2020-06-17 PROBLEM — A04.72 CLOSTRIDIUM DIFFICILE COLITIS: Status: ACTIVE | Noted: 2020-01-01

## 2020-06-18 NOTE — ANESTHESIA PREPROCEDURE EVALUATION
Anesthesia Evaluation     Patient summary reviewed and Nursing notes reviewed   no history of anesthetic complications:  NPO Solid Status: > 8 hours  NPO Liquid Status: > 2 hours           Airway   Mallampati: II  TM distance: >3 FB  Neck ROM: full  No difficulty expected  Dental - normal exam     Pulmonary - normal exam    breath sounds clear to auscultation  (+) pleural effusion,   Cardiovascular - normal exam    ECG reviewed  Rhythm: regular  Rate: normal    (+) hypertension, CHF , PVD (s/p BKA),     ROS comment: 2019 Echo:  · Estimated EF = 45%.  · Left atrial cavity size is mildly dilated.  · There is calcification of the aortic valve.  · Left ventricular systolic function is mildly decreased.    Neuro/Psych  GI/Hepatic/Renal/Endo    (+) obesity,   hepatitis, liver disease ([place]; hepatic encephalopathy), diabetes mellitus type 2 using insulin,     ROS Comment: Bladder abscess    Musculoskeletal     Abdominal    Substance History      OB/GYN          Other   arthritis,                      Anesthesia Plan    ASA 4     general     intravenous induction     Anesthetic plan, all risks, benefits, and alternatives have been provided, discussed and informed consent has been obtained with: patient.    Plan discussed with CRNA.

## 2020-06-18 NOTE — ANESTHESIA POSTPROCEDURE EVALUATION
Patient: Kendrick Crystal    Procedure Summary     Date:  06/18/20 Room / Location:   [facility]    Anesthesia Start:  1207 Anesthesia Stop:      Procedure:  CYSTOSCOPY TRANSURETHRAL RESECTION OF PROSTATIC ABCESS (N/A ) Diagnosis:      Surgeon:  Mumtaz Paez MD Provider:  Andrei Greene MD    Anesthesia Type:  general ASA Status:  4          Anesthesia Type: general    Vitals  Vitals Value Taken Time   /94 6/18/2020  1:03 PM   Temp 97 °F (36.1 °C) 6/18/2020  1:03 PM   Pulse 76 6/18/2020  1:10 PM   Resp 16 6/18/2020  1:03 PM   SpO2 95 % 6/18/2020  1:10 PM   Vitals shown include unvalidated device data.        Post Anesthesia Care and Evaluation    Patient location during evaluation: PACU  Patient participation: complete - patient participated  Level of consciousness: awake and alert  Pain score: 0  Pain management: adequate  Airway patency: patent  Anesthetic complications: No anesthetic complications  PONV Status: none  Cardiovascular status: hemodynamically stable and acceptable  Respiratory status: nonlabored ventilation, acceptable and nasal cannula  Hydration status: acceptable

## 2020-06-18 NOTE — ANESTHESIA PROCEDURE NOTES
Airway  Urgency: elective    Date/Time: 6/18/2020 12:10 PM  Airway not difficult    General Information and Staff    Patient location during procedure: OR  CRNA: Andrei Barker CRNA    Indications and Patient Condition  Indications for airway management: airway protection    Preoxygenated: yes  MILS maintained throughout  Mask difficulty assessment: 2 - vent by mask + OA or adjuvant +/- NMBA    Final Airway Details  Final airway type: endotracheal airway      Successful airway: ETT  Cuffed: yes   Successful intubation technique: video laryngoscopy  Facilitating devices/methods: intubating stylet  Endotracheal tube insertion site: oral  Blade: Anne  Blade size: 3  ETT size (mm): 8.0  Cormack-Lehane Classification: grade I - full view of glottis  Placement verified by: chest auscultation and capnometry   Measured from: gums  ETT/EBT to gums (cm): 23  Number of attempts at approach: 1  Assessment: lips, teeth, and gum same as pre-op and atraumatic intubation

## 2020-07-01 NOTE — THERAPY TREATMENT NOTE
Acute Care - Occupational Therapy Treatment Note  [facility]     Patient Name: Kendrick Crystal  : 1970  MRN: [id number]  Today's Date: 2020             Admit Date: 2020       ICD-10-CM ICD-9-CM   1. Acute UTI N39.0 599.0   2. Urinary retention R33.9 788.20   3. Leukocytosis, unspecified type D72.829 288.60   4. Hyperglycemia R73.9 790.29   5. Epistaxis R04.0 784.7   6. Other chronic pain G89.29 338.29   7. SIRS (systemic inflammatory response syndrome) (CMS/Formerly [facility]) R65.10 995.90   8. Prostate abscess N41.2 601.2   9. Impaired mobility and ADLs Z74.09 V49.89    Z78.9      Patient Active Problem List   Diagnosis   • Liver cirrhosis secondary to DUNLAP (CMS/Formerly [facility])   • Essential hypertension   • Non-compliance   • Hyperlipemia   • Hypertriglyceridemia, essential   • Hypomagnesemia   • Type 2 diabetes mellitus with circulatory disorder and uncontrolled   • History of MRSA infection   • Diabetic ulcer of right lower leg (CMS/Formerly [facility])   • S/P BKA (below knee amputation), left (CMS/Formerly [facility])   • Peripheral vascular disease (CMS/Formerly [facility])   • Obesity (BMI 30-39.9)   • Elevated troponin   • Sepsis (CMS/Formerly [facility])   • Leukocytosis   • Acute UTI (urinary tract infection)   • Pneumaturia   • Gastroparesis due to secondary diabetes (CMS/Formerly [facility])   • History of Clostridioides difficile colitis   • Bipolar disorder (CMS/Formerly [facility])   • Acute respiratory failure with hypoxia (CMS/Formerly [facility])   • Diastolic CHF, chronic (CMS/Formerly [facility])   • Pleural effusion due to CHF (congestive heart failure) (CMS/Formerly [facility])   • Medical non-compliance   • Self neglect   • Fall   • Abscess   • Left thigh pain   • Wound of right leg   • Acute suppurative prostatitis    • Prostate abscess   • Diabetic ketoacidosis without coma associated with type 2 diabetes mellitus (CMS/Formerly [facility])   • Clostridium difficile colitis     Past Medical History:   Diagnosis Date   • Abdominal wall cellulitis 2019   • JUAN (acute kidney injury) (CMS/Formerly [facility]) 2018   • Arthritis    • Bipolar 1 disorder (CMS/Formerly [facility])    •  Cellulitis of right anterior lower leg 07/29/2018    WITH MRSA   • Chronic kidney disease, stage 3 (CMS/HCC)    • Cirrhosis (CMS/HCC)    • Counseling for insulin pump     removed   • Depression    • Diabetes mellitus (CMS/HCC)    • Encephalopathy, hepatic (CMS/HCC)    • H/O degenerative disc disease    • History of Lissa's gangrene     right leg/testicle   • Hyperlipemia    • Hypertension    • MRSA infection    • multiple Skin abscesses    • DUNLAP, bx showed stage IV fibrosis    • Neuropathy    • Peripheral vascular disease (CMS/HCC)    • Thrombophlebitis      Past Surgical History:   Procedure Laterality Date   • ABDOMINAL WALL ABSCESS INCISION AND DRAINAGE N/A 4/26/2019    Procedure: ABDOMINAL WALL DEBRIDEMENT;  Surgeon: Fadi Kern MD;  Location:  [facility];  Service: General   • AMPUTATION DIGIT Left 1/30/2017    Procedure: left fourth and fifth transmetatarsal toe amputation ;  Surgeon: Juancho Martinez MD;  Location:  [facility];  Service:    • BELOW KNEE AMPUTATION Left 3/2/2017    Procedure: AMPUTATION BELOW KNEE, SHMUEL;  Surgeon: Juancho Martinez MD;  Location:  [facility];  Service:    • CYSTOSCOPY N/A 10/16/2019    Procedure: CYSTOSCOPY;  Surgeon: Brad Gutierres MD;  Location:  [facility];  Service: Urology   • CYSTOSCOPY TRANSURETHRAL RESECTION OF PROSTATE N/A 6/18/2020    Procedure: CYSTOSCOPY TRANSURETHRAL RESECTION OF PROSTATIC ABCESS;  Surgeon: Mumtaz Paez MD;  Location:  [facility];  Service: Urology;  Laterality: N/A;   • ENDOSCOPY     • HEMORRHOIDECTOMY N/A 8/2/2019    Procedure: EXCISION AND DRAIN PERIRECTAL ABSCESS;  Surgeon: Mumtaz Payne MD;  Location:  [facility];  Service: General   • INCISION AND DRAINAGE LEG Right 5/27/2020    Procedure: LOWER EXTREMITY DEBRIDEMENT RIGHT;  Surgeon: Juancho Martinez MD;  Location:  [facility];  Service: General;  Laterality: Right;   • LEG SURGERY     • SIGMOIDOSCOPY N/A 6/11/2020    Procedure: SIGMOIDOSCOPY FLEXIBLE;  Surgeon: Mumtaz Payne MD;   "Location: [facility] ENDOSCOPY;  Service: General;  Laterality: N/A;   • TESTICLE SURGERY Right     DEBRIDEMENT FROM GANGRENE   • TONSILLECTOMY  1975       Therapy Treatment    Rehabilitation Treatment Summary     Row Name 07/01/20 0755             Treatment Time/Intention    Discipline  occupational therapist  -JY      Document Type  therapy note (daily note)  -JY      Subjective Information  complains of readiness to leave hospital   -JY      Mode of Treatment  occupational therapy  -JY      Patient/Family Observations  no family present   -JY      Care Plan Review  care plan/treatment goals reviewed  -JY      Therapy Frequency (OT Eval)  3 times/wk  -JY      Patient Effort  fair  -JY      Existing Precautions/Restrictions  fall;other (see comments) contact (spore) prec, L BKA, catheter   -JY      Equipment Issued to Patient  -- pink sponge and theraputty for TE/TA   -JY      Patient Response to Treatment  pt verbalized, \" I don't see the point in doing therapy if I'm not going to rehab after I leave\" (Education provided)   -JY      Recorded by [JY] Sarahi Ann, OT 07/01/20 0846      Row Name 07/01/20 0755             Vital Signs    Pre Systolic BP Rehab  143  -JY      Pre Treatment Diastolic BP  85  -JY      Post Systolic BP Rehab  146  -JY      Post Treatment Diastolic BP  82  -JY      Pretreatment Heart Rate (beats/min)  78  -JY      Posttreatment Heart Rate (beats/min)  79  -JY      Pre SpO2 (%)  95  -JY      O2 Delivery Pre Treatment  room air  -JY      O2 Delivery Intra Treatment  room air  -JY      Post SpO2 (%)  93  -JY      O2 Delivery Post Treatment  room air  -JY      Pre Patient Position  Supine  -JY      Intra Patient Position  Supine  -JY      Post Patient Position  Supine  -JY      Recorded by [JY] Sarahi Ann, OT 07/01/20 0846      Row Name 07/01/20 0755             Cognitive Assessment/Intervention    Additional Documentation  Cognitive Assessment/Intervention (Group)  -JY      Recorded by [JY] " Sarahi Ann, OT 07/01/20 0846      Row Name 07/01/20 0755             Cognitive Assessment/Intervention- PT/OT    Affect/Mental Status (Cognitive)  agitated  -JY      Orientation Status (Cognition)  oriented x 4  -JY      Follows Commands (Cognition)  WFL  -JY      Cognitive Function (Cognitive)  WFL  -JY      Safety Deficit (Cognitive)  moderate deficit;awareness of need for assistance;insight into deficits/self awareness;judgment  -JY      Cognitive Assessment/Intervention Comment  pt often requires max motivation to continnue w/ interventions, doesn't perceive purpose   -JY      Recorded by [JY] Sarahi Ann, OT 07/01/20 0846      Row Name 07/01/20 0755             Safety Issues, Functional Mobility    Comment, Safety Issues/Impairments (Mobility)  defer to PT for specifics; pt deferred any EOB or OOB activity; very self limiting behaviors   -JY      Recorded by [JY] Sarahi Ann OT 07/01/20 0846      Row Name 07/01/20 0755             Bed Mobility Assessment/Treatment    Comment (Bed Mobility)   pt deferred any EOB or OOB activity; very self limiting behaviors; educated pt on need to address bed mobility and trial sitting EOB to strengthen core/trunk muscles as well as UEs in prep for sitting tolerance and skill set for t/f   -JY      Recorded by [JY] Sarahi Ann, OT 07/01/20 0846      Row Name 07/01/20 0755             Functional Mobility    Functional Mobility- Comment  pt deferred any EOB or OOB activity; very self limiting behaviors   -JY      Recorded by [JY] Sarahi Ann OT 07/01/20 0846      Row Name 07/01/20 0755             Transfer Assessment/Treatment    Comment (Transfers)  pt deferred any EOB or OOB activity; very self limiting behaviors; educated pt on the purpose of t/f training in prep for pt choice to return home  -JY      Recorded by [JY] Sarahi Ann OT 07/01/20 0846      Row Name 07/01/20 0755             ADL Assessment/Intervention    21107 - OT Self Care/Mgmt Minutes  5   -JY      BADL Assessment/Intervention  feeding  -JY      Recorded by [JY] Sarahi Ann, OT 07/01/20 0846      Row Name 07/01/20 0755             Self-Feeding Assessment/Training    [place] Level (Feeding)  not tested  -JY      Assistive Devices (Feeding)  built-up handle utensils  -JY      Position (Self-Feeding)  other (see comments) semi reclined in bed  -JY      Comment (Feeding)  OT inquired about use of adaptive utensils provided to pt and pt reported that spouse has taken home so as to not be lost at hospital; educated pt on need for availability at hospital in order to use and receive further training w/ authentic use. Pt continues to report chronic numbness and weakness at hands and impact on self feeding. OT placed cup w/ handle back in pt's working area in preparation for breakfast meal and allowed pt use.   -JY      Recorded by [JY] Sarahi Ann, OT 07/01/20 0846      Row Name 07/01/20 0755             BADL Safety/Performance    Impairments, BADL Safety/Performance  endurance/activity tolerance;grasp/prehension;coordination;strength;range of motion;other (see comments) impairments limiting self feeding   -JY      Skilled BADL Treatment/Intervention  adaptive equipment training  -JY      Progress in BADL Status  other (see comments) no change as utensils are not available for adaptive approac  -JY      Recorded by [JY] Sarahi Ann, OT 07/01/20 0846      Row Name 07/01/20 0755             Motor Skills Assessment/Interventions    Additional Documentation  Balance (Group);Therapeutic Exercise (Group);Therapeutic Exercise Interventions (Group)  -JY      Recorded by [JY] Sarahi Ann OT 07/01/20 0846      Row Name 07/01/20 0755             Therapeutic Exercise    62281 - OT Therapeutic Exercise Minutes  8  -JY      49764 - OT Therapeutic Activity Minutes  15  -JY      Recorded by [JY] Sarahi Ann OT 07/01/20 0846      Row Name 07/01/20 0755             Therapeutic Exercise    Hand  (Therapeutic Exercise)  finger flexion/extension, right;thumb finger opposition, right;hand , right  -JY      Exercise Type (Therapeutic Exercise)  AROM (active range of motion);resistive exercises  -JY      Position (Therapeutic Exercise)  other (see comments) semi reclined   -JY      Sets/Reps (Therapeutic Exercise)  1/10  -JY      Equipment (Therapeutic Exercise)  other (see comments) resistive pink sponge and pink theraputty   -JY      Expected Outcome (Therapeutic Exercise)  improve object manipulation, self-care activity;improve performance, BADLs;improve performance, fine motor coordination skills  -JY      Comment (Therapeutic Exercise)  pt req'd max motivation to complete exercises to full range/capacity as pt didn't see purpose; issued sponge for focused coordination in prehension, in hand manipulation, putty for further FMC and strengthening   -JY      Recorded by [JY] Sarahi Ann, OT 07/01/20 0846      Row Name 07/01/20 0755             Balance    Balance  static sitting balance;static standing balance;dynamic sitting balance;dynamic standing balance  -JY      Recorded by [JY] Sarahi Ann, OT 07/01/20 0846      Row Name 07/01/20 0755             Static Sitting Balance    Level of [place] (Unsupported Sitting, Static Balance)  other (see comments) not assessed   -JY      Comment (Unsupported Sitting, Static Balance)  Pt declined   -JY      Recorded by [JY] Sarahi Ann, OT 07/01/20 0846      Row Name 07/01/20 0755             Dynamic Sitting Balance    Level of [place], Reaches Outside Midline (Sitting, Dynamic Balance)  other (see comments) not assessed   -JY      Comment, Reaches Outside Midline (Sitting, Dynamic Balance)  pt declined   -JY2      Recorded by [JY] Sarahi Ann, OT 07/01/20 1020  [JY2] Sarahi Ann, OT 07/01/20 0846      Row Name 07/01/20 0755             Static Standing Balance    Level of [place] (Supported Standing, Static Balance)  other (see comments)  not assessed   -JY      Comment (Supported Standing, Static Balance)  pt declined   -JY      Recorded by [JKASHMIR] Sarahi Ann, OT 07/01/20 1020      Row Name 07/01/20 0755             Dynamic Standing Balance    Level of [place], Reaches Outside Midline (Standing, Dynamic Balance)  other (see comments) not assessed   -JY      Comment, Reaches Outside Midline (Standing, Dynamic Balance)  pt declined   -JY      Recorded by [JKASHMIR] Sarahi Ann, OT 07/01/20 1020      Row Name 07/01/20 0755             Fine Motor Testing & Training    Comment, Fine Motor Coordination  opposition intact, yet w/ increased time and processing   -JY      Recorded by [JKASHMIR] Sarahi Ann, OT 07/01/20 1020      Row Name 07/01/20 0755             Positioning and Restraints    Pre-Treatment Position  in bed  -JY      Post Treatment Position  bed  -JY      In Bed  notified nsg;fowlers;call light within reach;encouraged to call for assist;exit alarm on;side rails up x2;legs elevated  -JY      Recorded by [FAN] Sarahi Ann, OT 07/01/20 1020      Row Name 07/01/20 0755             Pain Assessment    Additional Documentation  Pain Scale: Numbers Pre/Post-Treatment (Group)  -JY      Recorded by [FAN] Sarahi Ann OT 07/01/20 1020      Row Name 07/01/20 0755             Pain Scale: Numbers Pre/Post-Treatment    Pain Scale: Numbers, Pretreatment  0/10 - no pain  -JY      Pain Scale: Numbers, Post-Treatment  0/10 - no pain  -JY      Pre/Post Treatment Pain Comment  tolerated   -JY      Pain Intervention(s)  Repositioned;Ambulation/increased activity  -JY      Recorded by [FAN] Sarahi Ann, OT 07/01/20 1020      Row Name 07/01/20 0755             Sensory Assessment/Intervention    Sensory General Assessment  light touch sensation deficits identified chronic numbness reported in BUEs   -JY      Recorded by [FAN] Sarahi Ann OT 07/01/20 1020      Row Name 07/01/20 0755             Vision Assessment/Intervention    Visual Impairment/Limitations   legally blind  -JY      Recorded by [JY] Sarahi Ann, OT 07/01/20 1020      Row Name 07/01/20 0755             Light Touch Sensation Assessment    Left Upper Extremity: Light Touch Sensation Assessment  mild impairment, 75% or more correct responses  -JY      Comment, Left Upper Extremity: Light Touch Sensation Assessment  chronic numbness   -JY      Right Upper Extremity: Light Touch Sensation Assessment  mild impairment, 75% or more correct responses  -JY      Comment, Right Upper Extremity: Light Touch Sensation Assessment  chronic numbness   -JY      Recorded by [JY] Sarahi Ann, OT 07/01/20 1020      Row Name                Residual Limb Assessment 05/30/19 2000 transtibial (below knee), left    Residual Limb Assessment - Properties Group Date first assessed: 05/30/19 [BN] Time first assessed: 2000 [BN] Location: transtibial (below knee), left [PT] Recorded by:  [BN] Mariah Flores RN 05/31/19 0138 [PT] Amy Villanueva LPN 05/20/19 0102    Row Name                Wound 11/19/19 0017 Right anterior;lower other (see notes) Abrasion    Wound - Properties Group Date first assessed: 11/19/19 [SF] Time first assessed: 0017 [SF] Present on Hospital Admission: Y [SF] Side: Right [SF] Orientation: anterior;lower [SF] Location: other (see notes) [SF], shin   Primary Wound Type: Abrasion [SF] Recorded by:  [SF] Carol Perea RN 11/19/19 0514    Row Name                Wound 06/22/20 1010 Right anterior;lower leg Venous Ulcer    Wound - Properties Group Date first assessed: 06/22/20 [MR] Time first assessed: 1010 [MR] Present on Hospital Admission: N [MR] Side: Right [MR] Orientation: anterior;lower [MR] Location: leg [MR] Primary Wound Type: Venous ulcer [MR] Recorded by:  [MR] Veronica Smith RN 06/22/20 1149    Row Name 07/01/20 0755             Plan of Care Review    Plan of Care Reviewed With  patient  -JY      Progress  no change  -JY      Recorded by [JY] Sarahi Ann, OT 07/01/20 1020           User Key  (r) = Recorded By, (t) = Taken By, (c) = Cosigned By    Initials Name Effective Dates Discipline    Veronica Li, RN 06/16/16 -  Nurse    Amy Arriaga LPN 03/14/16 -  Nurse    Carol Shane, RN 09/26/17 -  Nurse    Mariah Ervin RN 02/04/19 -  Nurse    Sarahi Calles, OT 01/29/20 -  OT        Rash 06/30/20 1045 groin macular (Active)   Distribution localized 7/1/2020  8:50 AM   Borders irregular 7/1/2020  8:50 AM   Characteristics moist 7/1/2020  8:50 AM   Color red 7/1/2020  8:50 AM       Rash 06/30/20 1045 upper thigh papule (Active)   Wound Image   6/30/2020 10:45 AM   Distribution localized 7/1/2020  8:50 AM   Configuration/Shape asymmetric 7/1/2020  8:50 AM   Borders irregular 7/1/2020  8:50 AM   Characteristics dry;raised 7/1/2020  8:50 AM   Color red;yellow 6/30/2020 10:45 AM       Wound 11/19/19 0017 Right anterior;lower other (see notes) Abrasion (Active)   Wound Image   6/30/2020 10:45 AM   Dressing Appearance dry;intact 7/1/2020  8:50 AM   Closure KALYN 7/1/2020  8:50 AM   Base dressing in place, unable to visualize 7/1/2020  8:50 AM   Periwound intact;dry;blanchable 6/30/2020  8:00 PM   Edges irregular;open;deb 6/30/2020 10:45 AM   Wound Length (cm) 2.1 cm 6/30/2020 10:45 AM   Wound Width (cm) 3.9 cm 6/30/2020 10:45 AM   Wound Depth (cm) 0.1 cm 6/30/2020 10:45 AM   Drainage Amount none 6/30/2020 10:45 AM   Care, Wound cleansed with;sterile normal saline 6/30/2020  8:00 PM   Dressing Care, Wound dressing changed;foam;low-adherent;petroleum-based 6/30/2020 10:45 AM   Periwound Care, Wound dry periwound area maintained 6/30/2020  8:00 PM       Wound 06/22/20 1010 Right anterior;lower leg Venous Ulcer (Active)   Wound Image   6/30/2020 10:45 AM   Dressing Appearance dry;intact 7/1/2020  8:50 AM   Closure KALYN 7/1/2020  8:50 AM   Base dressing in place, unable to visualize 7/1/2020  8:50 AM   Periwound intact;dry;pink;blanchable 6/30/2020  8:00 PM   Edges  irregular;open;deb 6/30/2020 10:45 AM   Wound Length (cm) 5.5 cm 6/30/2020 10:45 AM   Wound Width (cm) 4.5 cm 6/30/2020 10:45 AM   Wound Depth (cm) 0.2 cm 6/30/2020 10:45 AM   Drainage Amount none 6/30/2020 10:45 AM   Care, Wound cleansed with;sterile normal saline 6/30/2020  8:00 PM   Dressing Care, Wound dressing changed;foam;low-adherent 6/30/2020 10:45 AM   Periwound Care, Wound dry periwound area maintained 6/30/2020  8:00 PM       Occupational Therapy Education                 Title: PT OT SLP Therapies (In Progress)     Topic: Occupational Therapy (In Progress)     Point: ADL training (In Progress)     Description:   Instruct learner(s) on proper safety adaptation and remediation techniques during self care or transfers.   Instruct in proper use of assistive devices.              Learning Progress Summary           Patient Acceptance, E,D, NR by FAN at 7/1/2020 0755    Eager, E, VU by CF at 7/1/2020 0103    Eager, E, VU by AR at 6/22/2020 0910                   Point: Home exercise program (In Progress)     Description:   Instruct learner(s) on appropriate technique for monitoring, assisting and/or progressing therapeutic exercises/activities.              Learning Progress Summary           Patient Acceptance, E,D, NR by FAN at 7/1/2020 0755    Eager, E, VU by CF at 7/1/2020 0103    Eager, E, VU by AR at 6/22/2020 0910                   Point: Precautions (In Progress)     Description:   Instruct learner(s) on prescribed precautions during self-care and functional transfers.              Learning Progress Summary           Patient Acceptance, E,D, NR by FAN at 7/1/2020 0755    Eager, E, VU by CF at 7/1/2020 0103    Eager, E, VU by AR at 6/22/2020 0910                   Point: Body mechanics (In Progress)     Description:   Instruct learner(s) on proper positioning and spine alignment during self-care, functional mobility activities and/or exercises.              Learning Progress Summary              Patient Acceptance, E,D, NR by FAN at 7/1/2020 0755    JOYCE De Los Santos, VU by CF at 7/1/2020 0103    JOYCE De Los Santos VU by AR at 6/22/2020 0910                               User Key     Initials Effective Dates Name Provider Type Discipline    AR 06/22/15 -  Fiona Main, OT Occupational Therapist OT    CF 07/03/19 -  Nimo Gomez, RN Registered Nurse Nurse    FAN 01/29/20 -  Sarahi Ann OT Occupational Therapist OT                OT Recommendation and Plan  Therapy Frequency (OT Eval): 3 times/wk  Plan of Care Review  Plan of Care Reviewed With: patient  Plan of Care Reviewed With: patient  Outcome Summary: Pt alert and oriented. Required increased motivation to participate in therapy. Pt inquired about purpose of therapy given pt choice to not pursue further rehab post hospitalization but rather return home. OT educated pt on purpose of therapy, potential risks and benefits. Emphasized need to progress bed mobility and t/f training in preparation for return home and caregiver burden. Pt anticipating arrival of breakfast, yet without AE (built up utensils) as pt reported spouse has for safe keeping. Emphasized need for availability at hospital for increased I in feeding given chronic numbness and coordination deficits. Reiterated importance of optimal position for reach, t/f and manipulation. Made cup w/ handles available. Issued initial HEP incorporating fine motor and hand strengthening tasks w/ pink sponge and theraputty. Pt completed 1 set x 10 reps of each exercise w/ continued motivational cues. Pt deferred any EOB sitting or change of position to recliner for upright sitting for meal. Will progress pt as pt willing while at hospital.  Outcome Measures     Row Name 07/01/20 0755             How much help from another is currently needed...    Putting on and taking off regular lower body clothing?  2  -JY      Bathing (including washing, rinsing, and drying)  2  -JY      Toileting (which includes using toilet  bed pan or urinal)  2  -JY      Putting on and taking off regular upper body clothing  3  -JY      Taking care of personal grooming (such as brushing teeth)  3  -JY      Eating meals  3  -JY      AM-PAC 6 Clicks Score (OT)  15  -JY         Functional Assessment    Outcome Measure Options  AM-PAC 6 Clicks Daily Activity (OT)  -JY        User Key  (r) = Recorded By, (t) = Taken By, (c) = Cosigned By    Initials Name Provider Type    Sarahi Calles OT Occupational Therapist           Time Calculation:   Time Calculation- OT     Row Name 07/01/20 0755             Time Calculation- OT    OT Start Time  0755  -JY      OT Received On  07/01/20  -JY      OT Goal Re-Cert Due Date  07/02/20  -JY         Timed Charges    39347 - OT Therapeutic Exercise Minutes  8  -JY      70790 - OT Therapeutic Activity Minutes  15  -JY      49832 - OT Self Care/Mgmt Minutes  5  -JY        User Key  (r) = Recorded By, (t) = Taken By, (c) = Cosigned By    Initials Name Provider Type    Sarahi Calles OT Occupational Therapist        Therapy Charges for Today     Code Description Service Date Service Provider Modifiers Qty    16785763808 HC OT THER PROC EA 15 MIN 7/1/2020 Sarahi Ann OT GO 1    16729334683 HC OT THERAPEUTIC ACT EA 15 MIN 7/1/2020 Sarahi Ann OT GO 1               Sarahi Ann OT  7/1/2020

## 2020-07-01 NOTE — PROGRESS NOTES
"                  Clinical Nutrition     Reason for Visit:   Follow-up protocol    Patient Name: Kendrick Crystal  YOB: 1970  MRN: [id number]  Date of Encounter: 07/01/20 11:50  Admission date: 6/7/2020    Nutrition Assessment   Assessment     Admission diagnosis  Sepsis, resolved    Additional diagnosis/conditions/procedures this admission  UTI  L thigh pain  Fall  RLE wound, chronic  Psuedohyponatremia, resolved  Acute urinary retention  Prostate abscess  DKA, resolved  Delirium, improving  Anemia  C.diff+ (6/17)    (6/8) WOC - 6.5 mc x 8.0 cm x 0.2 cm RLE  (6/11) s/p flexible sigmoidoscopy  (6/18) s/p CTURP  (6/23) WOC - wound to right lower laterar/posterior ankle, measures 6.0 cm x 4.5 cm x 0.1 cm  (6/30) WOC - Right ankle wound 5.5 X 4.5 X 0.2 cm; Right shin wound 2.1 X 3.9 X 0.1 cm    Additional PMH/procedures:  HLP  HTN  DM  MRSA infection  DUNLAP cirrhosis, stage IV fibrosis  Neuropathy  Depression  Lissa's gangrene  CKD3  Bipolar 1 d/o  Tobacco    RLE I&D (5/2020)  Hemorrhoidectomy (8/2019)  Abdominal wall I&D (4/2019)  L BKA (3/2017)  L 4th/5th toe amputation (1/2017)    Reported/Observed/Food/Nutrition Related History:      Patient is isolation precautions d/t C. Diff infection. Per case management, patient refusing rehab and plans to d/c home with home health following antibiotic infusions. Patient tolerating regular diet. No longer on Consistent Carbohydrate modifications as patient was refusing. 50% of breakfast consumed this AM.      Anthropometrics     Height: 190.5 cm (75\")  Last filed wt: Weight: 107 kg (235 lb) (06/08/20 1021)    BMI: N/A, L BKA    Ideal Body Weight (IBW) (kg): 90.45  Admission wt: 235 lbs  Method obtained: weight per charting 6/7 no method listed    Labs reviewed     Results from last 7 days   Lab Units 07/01/20  0339 06/29/20  0521 06/27/20  0454   GLUCOSE mg/dL 157* 171* 167*   BUN mg/dL 9 8 10   CREATININE mg/dL 0.53* 0.51* 0.49*   SODIUM mmol/L 139 138 137 "   CHLORIDE mmol/L 103 102 104   POTASSIUM mmol/L 4.5 3.9 3.7   ALT (SGPT) U/L  --  6  --      Results from last 7 days   Lab Units 06/29/20  0521   ALBUMIN g/dL 2.20*       Results from last 7 days   Lab Units 07/01/20  1114 07/01/20  0818 06/30/20  1557 06/30/20  1123 06/30/20  0752 06/29/20 2018   GLUCOSE mg/dL 190* 163* 185* 160* 146* 266*     Lab Results   Lab Value Date/Time    HGBA1C 16.60 (H) 05/27/2020 1103    HGBA1C 16.90 (H) 05/19/2020 1310       Medications reviewed   Pertinent: IV abx, gabapentin, insulin, zofran, flagyl, seroquel, pericolace      Intake/Output 24 hrs (7:00AM - 6:59 AM)     Intake & Output (last day)       06/30 0701 - 07/01 0700 07/01 0701 - 07/02 0700    P.O. 240 360    Other  10    IV Piggyback  100    Total Intake(mL/kg) 240 (2.2) 470 (4.4)    Urine (mL/kg/hr) 850 (0.3)     Stool 0     Total Output 850     Net -610 +470          Stool Unmeasured Occurrence 1 x         Needs Assessment (6/23)     Height used 190.5 cm (75 in)   Weight used 107 kg (235 lb)   Adjusted IBW  83.8 kg (184 lbs)     Estimated need Method/Equation used Result    Energy/Calorie need   ~1800 kcals/d  25 x adjusted IBW  14 - 18 kcal/kg actBW  2095 kcal   1498 - 1926 kcal             Protein   ~105 g/day  1.0 g/kg act   1.2 g/kg  g  107 g            Current Nutrition Prescription     PO: Diet Regular  Dietary Nutrition Supplements: Premier Protein x2/d  Intake: 56% x 4 meals    Nutrition Diagnosis     6/17 updated 6/19, 6/23, 6/26, 7/1  Problem Inadequate oral intake   Etiology Clinical status   Signs/Symptoms PO Intake (56% x 4 meals)   Status: ongoing    Nutrition Intervention     1. Follow treatment progress, Care plan reviewed  2. Cont to send ONS for increase kcal/protein  3. RD notes A1c of 16.6%. Patient with history of multiple previous admissions where clinical nutrition has provided/offered nutrition education. Patient continues to be openly noncompliant with insulin regimen    Goal:   General:  Nutrition support treatment  PO: Increase intake      Monitoring/Evaluation:   Per protocol, PO intake, Supplement intake, Pertinent labs, POC/GOC    Will Continue to follow per protocol    Atiya Keys RDN, LD  Time Spent: 25 minutes

## 2020-07-01 NOTE — PROGRESS NOTES
[facility] Progress Note        Antibiotics:  Anti-Infectives (From admission, onward)    Ordered     Dose/Rate Route Frequency Start Stop    06/27/20 1525  metroNIDAZOLE (FLAGYL) 500 mg/100mL IVPB     Jael Barbosa [facility] reviewed the order on 06/29/20 1401.   Ordering Provider:  Usman Dong MD    500 mg  over 60 Minutes Intravenous Every 6 Hours Scheduled 06/27/20 1615 07/07/20 2059    06/26/20 0716  micafungin 100 mg/100 mL 0.9% NS IVPB (mbp)     Jael Barbosa [facility] reviewed the order on 06/29/20 1401.   Ordering Provider:  Usman Dong MD    100 mg  over 60 Minutes Intravenous Every 24 Hours 06/26/20 1200 07/03/20 0859    06/22/20 1814  ampicillin-sulbactam 3 g/100 mL 0.9% NS (MBP)     Jael Barbosa [facility] reviewed the order on 06/23/20 0949.   Ordering Provider:  Usman Dong MD    3 g Intravenous Every 6 Hours 06/22/20 2000 07/06/20 1959    06/17/20 1147  metroNIDAZOLE (FLAGYL) 500 mg/100mL IVPB     Jael Barbosa [facility] reviewed the order on 06/18/20 0937.   Ordering Provider:  Mumtaz Paez MD    500 mg  over 60 Minutes Intravenous Every 6 Hours 06/17/20 1400 06/27/20 0941    06/17/20 1147  vancomycin oral solution reconstituted 250 mg     Usman Dong MD reviewed the order on 06/26/20 0716.   Ordering Provider:  Usman Dong MD    250 mg Oral Every 6 Hours Scheduled 06/17/20 1245 07/11/20 1159    06/09/20 0916  micafungin 100 mg/100 mL 0.9% NS IVPB (mbp)     Ordering Provider:  Usman Dong MD    100 mg  over 60 Minutes Intravenous Once 06/09/20 1015 06/09/20 1223    06/08/20 0453  vancomycin 2750 mg/500 mL 0.9% NS IVPB ([facility])     Ordering Provider:  Usman Mazariegos [facility]    25 mg/kg × 107 kg  over 180 Minutes Intravenous Once 06/08/20 0545 06/08/20 0846    06/08/20 0446  piperacillin-tazobactam (ZOSYN) 3.375 g in iso-osmotic dextrose 50 ml (premix)     Ordering Provider:  Imelda Paige APRN    3.375 g  over 30 Minutes Intravenous Once 06/08/20 0530 06/08/20 0604     20 0138  cefTRIAXone (ROCEPHIN) 1 g/50 mL 0.9% NS VTB (mbp)     Ordering Provider:  Kendrick Sotelo MD    1 g  over 30 Minutes Intravenous Once 20 0140 20 0218          CC: nausea    HPI:  Patient is a 49 y.o.  Yr old male with history of poorly contolled T2DM, DUNLAP/liver cirrhosis, HTN, PVD/Left BKA, and multiple skin abscesses/MRSA who presented to [facility] ED with right shin wound infection summer 2018.  He was unable to get to see Dr. Martinez as his father passed away unexpectedly on 18 and he had to wait until after the . The wound worsened becoming gangrenous and he came to [facility] ED in 2018.  He also had been hospitalized at [facility] and then transferred to  for a severe penis/scrotum infection requiring surgical debridement in summer 2018.  He received antibiotics regarding his right leg infection, generally improved over the remainder of summer 2018 but that area had not completely healed. He was readmitted 2019 with acute left lower abdominal wall redness/swelling and pain, spontaneous drainage associated with fever/chills and uncontrolled blood sugars.   I&D requiring wide debridement , severe/deep infection and transferred to [facility] on May 3, 2019 with IV Zosyn/oral doxycycline. Cultures had lactobacillus and mixed gram-positive skin sherly including alpha strep, staph epidermidis and corynebacterium. Readmitted to [facility] May 18, 2019, increasing generalized edema ultimately transitioned to oral doxycycline and healed.     Readmitted 2019 with acute rectal pain that had begun ; this is associated with constipation for days, chills and nausea/dry heaving.  White blood cell count elevated, high hemoglobin A1c over 13, urinalysis with hematuria/pyuria and CT scan with 3 x 3 cm fluid collection anterior to the distal rectum and per discussion with Dr. Akbar/Jennifer, this was not in the prostate.  " Colorectal surgery/urology saw him for consideration of surgical options/timing/threshold, etc..     8/2/19 I&Dper Dr Payne perirectal abscess; patient reports that he had instrumented his rectum prior to hospitalization with enema     8/3/19 urinary retention overnight requiring in/out catheterization per patient.  Creat climb     8/4/19 further creat climb; childs placed and lisinopril stopped and d/w Dr Rubio;  ?obstruction initially associated with retention postop (1200 out with I/O cath postop per nursing) -v- contrast from CT -v- lisinopril/medication/vancomycin -v- relative hypotension -v- likely combination of factors with ATN; nephrology following; vancomycin trough high and vancomycin stopped empirically 8/3 although high trough potentially accumulation as a consequence of diminishing renal function associated with retention/contrast/pressure rather than cause.  Nonetheless, opted to avoid at that time; creatinine trending down.      8/7/19 in retrospect he remembers air in his urine at admit which has raised concern for possible fistula from urinary tract;  No fecaluria and culture from abscess is fecal sherly;  ?rectal-urethral fistula;  Dr Payne aware     Culture with E. coli/Klebsiella species and creatinine generally trended down, transitioned to oral antibiotics at discharge.     Readmitted August 17, 2019 with nausea/vomiting/dry heaves for 3 days.  Childs catheter was placed and CT scan of the abdomen showed:      \"Previously seen fluid collection identified inferior to the prostate gland is more increased in density on today's examination. There is wall thickening again seen throughout the bladder. Findings again are concerning for cystitis.\" per radiology     He was transitioned to oral omnicef/topical antifungal on approx 8/23/19; Noncompliant with f/u in our office     READMITTED 10/8/19 RLQ abd pain, urinary retention, nausea, dysuria and generalized fatigue     UTI by U/A, subsequent blood " cultures positive for  kleb sp, urine with kleb and e coli ;  Abnormal CT abd with right perinephric fluid collection, advanced cirrhosis, urinary bladder thickening.  10/9 Aspirate of perinephric fluid collection consistent with abscess/kleb and white blood cells; 10/16/19 cytoscopy with bullous change per Dr Gutierres but no fistula per him; 10/19/19 intermittent nausea, no vomiting or dry heaves this am, intermittent dry cough broadened to zosyn with ?aspiration pneumonia evolving after dry heaves/vomiting and had intermittent diarrhea, oral vancomycin added empirically with history of prior C. Difficile; improved with IV Zosyn/oral vancomycin and he refused consideration of placement.  He insisted on home, discharged October 23 and noncompliant with outpatient therapy and outpatient follow-up October 25.  He had become fatigued such that his family could not assist him out of bed and it was recommended by my office that he return to the emergency room October 25.  He apparently refused that but did come to the emergency room October 26.     READMITTED October 26, 2019 with generalized weakness, fatigue/malaise and nausea/vomiting/diarrhea.  CT scan revealed increased size of perinephric fluid collection per radiology. 10/29 drain placed; 11/5/19 drain inadvertently out overnight per him;11/8 voiding cystourethrogram per urology; I d/w Dr Escobedo 11/11 and he reviewed the US from 11/8 and he reports to me US is adequate for followup on this and NO current evidence for abscess, no need for CT scan at that time to evaluate abscess per d/w him and given clinical improvements/radiographic improvements; finished abx prior to discharge 11/14 and noncompliant with physical therapy, he refused placement and insisted on home.     READMITTED 11/18/19 with multifactorial complaints.  He reported generalized fatigue and inability to move himself around, increased dyspnea with dry cough and dry heaves, urinary frequency/retention  "and small amounts of urination; noted to have leukocytosis, elevated troponin/BNP, with hematuria/pyuria and bacteriuria/small yeast; chest x-ray with increased pulmonary vascularity bilaterally and ill-defined opacification left lung base with small left pleural effusion per radiology.  CT abdomen with large pleural effusions with dependent airspace disease, likely atelectasis per radiology and limited ability to evaluate for right sided perinephric abscess per radiology     11/22 CT chest no focal consolidation per radiology; 11/23/19 JUAN after diuresis; later in November he was discharged with oral antibiotics but noncompliant with follow-up.     Readmitted June 7, 2020 with inability to urinate, dysuria and nausea/dry heaves with subjective fever.  Had severe leukocytosis with acute pyuria/hematuria concerning for UTI and subsequent urinary culture with gram-negative lon.  CT scan of the abdomen/pelvis with concern for acute suppurative prostatitis and possible prostatic abscess per radiology in addition to soft tissue thickening extending to the left lower pelvic sidewall.  No evidence for urinary tract obstruction with indwelling Walker catheter.  No specific mention of perinephric collection and no specific focal bowel abnormality described per radiology     He had inability to urinate with hesitancy and dysuria.      6/11 flex sig by Dr Payne    6/18/20 surgery by Dr Paez:  \"PROCEDURE:  Cystoscopy with transurethral resection of prostatic abscess and prostate obtaining tissue culture and permanent pathology.\"      7/1/20 diarrhea x 4 per him last 24 hours; currently refusing placement; per nursing,  no new pain;   less nausea;  No vomiting;  No gross hematuria/pyuria and no flank pain.  He denies scrotal pain.  No headache photophobia or neck stiffness.  No shortness of breath cough or hemoptysis.  No diarrhea.  No rash.     He has a chronic right lower leg wound which is clean and improving.  No new redness or " "pain there     Chronic left stump pain which is constant, sharp at times, nonradiating, worse with pressure, generally better with pain meds and currently 4 out of 10.  No new skin discoloration.  No open wound or active drainage.     ROS:      7/1/20 No f/c/s.  No rash. No new ADR to Abx.     Constitutional-- No chills or sweats.  Appetite diminished with fatigue Heent-- No new vision, hearing or throat complaints.  No epistaxis or oral sores.  Denies odynophagia or dysphagia.  No flashers, floaters or eye pain. No odynophagia or dysphagia. No headache, photophobia or neck stiffness.  CV-- No chest pain, palpitation or syncope  Resp-- No SOB/cough/Hemoptysis  GI-  No hematochezia, melena, or hematemesis. Denies jaundice or chronic liver disease.  --as above  Lymph- no swollen lymph nodes in neck/axilla or groin.   Heme- No active bruising or bleeding; no Hx of DVT or PE.  MS-- no swelling or pain in the bones or joints of arms/legs aside from above.  No new back pain.  Neuro-- No acute focal weakness or numbness in the arms or legs.  No seizures.     Full 12 point review of systems reviewed and negative otherwise for acute complaints, except for above       PE:   /82 (BP Location: Right arm, Patient Position: Lying)   Pulse 82   Temp 98 °F (36.7 °C) (Oral)   Resp 18   Ht 190.5 cm (75\")   Wt 107 kg (235 lb)   SpO2 93%   BMI 29.37 kg/m²     GENERAL: sleepy, in no acute distress.  Chronically ill  HEENT: Normocephalic, atraumatic.  PERRL. EOMI. No conjunctival injection. No icterus. Oropharynx clear without evidence of thrush or exudate. No evidence of peridontal disease.    NECK: Supple without nuchal rigidity. No mass.  LYMPH: No cervical, axillary or inguinal lymphadenopathy.  HEART: RRR; No murmur, rubs, gallops.   LUNGS: Diminished at bases to auscultation bilaterally without wheezing, rales, rhonchi. Normal respiratory effort. Nonlabored. No dullness.  ABDOMEN: Soft, mildly tender, nondistended. " Positive bowel sounds. No rebound or guarding. NO mass or HSM.  EXT:  No cyanosis, clubbing or edema. No cord.  MSK: FROM without joint effusions noted arms/legs.    SKIN: Warm and dry without cutaneous eruptions on Inspection/palpation.    NEURO: sleepy    No peripheral stigmata/phenomena of endocarditis     Right lower leg with clean wound through the dermis to the soft tissue but no probe to bone tendon joint or ligament.  No purulence and no surrounding redness induration or warmth.  No mass bulge or fluctuance.     Left amputation site with no open wound or active drainage.  No redness induration or warmth.  No mass bulge or fluctuance.  No crepitus or bulla.       Laboratory Data    Results from last 7 days   Lab Units 07/01/20  0339 06/29/20  0521 06/27/20  0454   WBC 10*3/mm3 13.01* 12.42* 13.55*   HEMOGLOBIN g/dL 8.2* 7.9* 7.9*   HEMATOCRIT % 27.2* 26.2* 26.8*   PLATELETS 10*3/mm3 450 365 351     Results from last 7 days   Lab Units 07/01/20  0339   SODIUM mmol/L 139   POTASSIUM mmol/L 4.5   CHLORIDE mmol/L 103   CO2 mmol/L 26.0   BUN mg/dL 9   CREATININE mg/dL 0.53*   GLUCOSE mg/dL 157*   CALCIUM mg/dL 7.2*     Results from last 7 days   Lab Units 06/29/20  0521   ALK PHOS U/L 102   BILIRUBIN mg/dL 0.2   ALT (SGPT) U/L 6   AST (SGOT) U/L 13               Estimated Creatinine Clearance: 218.1 mL/min (A) (by C-G formula based on SCr of 0.53 mg/dL (L)).      Microbiology:      Radiology:  Imaging Results (Last 72 Hours)     ** No results found for the last 72 hours. **            Impression:     --Acute sepsis.  Primary concern for urinary source.  Otherwise, chest clear/nonfocal with no cough.  No diarrhea although at risk for C. difficile relapse and will need to be monitored.  Right lower leg chronic wound is clean with no obvious cellulitis.  He has left stump pain but without obvious secondary cellulitis at present.  No meningismus.  Monitor for other potential foci     --Acute complicated UTI with  obstruction, prostatitis with  Abscess, Kleb Pneumoniae in urine culture.  MRI with abscess including extension to pelvic sidewall .   In addition, he had a complicated past history with concern for perirectal abscess in 2019 and underwent drainage in August with mixed fecal sherly.  I have discussed with Dr. Payne and he feels this is primarily prostatic issue so far rather than bowel/rectal.    **surgery 6/18 as above;  GS mixed and   IV Unasyn/mycamine ongoing;  Complex, deep seated process with concern kleb as primary pathogen in initially urine     --History Klebsiella septicemia and October-November 2019 with concern for urinary source and pyelonephritis associated with urinary retention and abnormal CT scan with perinephric collection/abscess including Klebsiella on aspirate.  See previous notes from 2019 admission regarding additional detail.  CT scan June 2020 with no description of perinephric collection per radiology report.     --History perirectal abscess 2019 seen previously by Dr. Payne.      --Acute/recurrent CDiff;  Oral vancomycin/IV flagyl ongoing;  IF significant ileus develops then you could consider rectal vancomycin enemas; I emphasized the importance of complying with oral vancomycin solution     --History acute renal failure August 2019 as outlined in prior notes.  Possible obstruction initially associated with postop retention, contrast from CT scan and potentially lisinopril/medication/vancomycin, relative hypotension but likely combination of these factors.  Vancomycin trough had been high and stopped empirically although high trough potentially and accumulation as consequence of diminishing renal function rather than cause.  Nonetheless.  We have attempted to avoid unless absolutely necessary.     --History MRSA     --Diabetes type 2.  Uncontrolled.  Medical noncompliance in general     --History DUNLAP/chronic liver disease and probable cirrhosis per past notes     --Left BKA with chronic  pain     --Chronic right lower leg wound appears clean without obvious secondary infection at present     --Peripheral vascular disease followed previously by Dr. Martinez     --Medical noncompliance; he voices understanding the negative impact and barrier to care caused by his noncompliance    PLAN:     --IV unasyn/flagyl and oral vancomycin, IV mycamine    -- urologic surgery 6/18     --Check/review labs cultures and scans     --flex sig 6/11     --Discussed with microbiology     --Partial history per nursing and family     --Highly complex set of issues with high risk for further serious morbidity and other serious sequela    **Urine culture similar to 2019 so far; ?recurrence -v- persistent urologic nidus    **d/w Dr Zarate ; his noncompliance is a barrier to outpatient care; ?placement options    **duration of abx to depend on  clinical course/tolerability, patient compliance etc.. I anticipate at least 14 days antifungal and coverage for lactobacillus ,  which will be 7/3 (both isolated at surgery but not initially in urine and significance unclear but covered, with concern for klebsiella as dominant pathogen given past hospitalizations and culture at admit); potentially longer duration for treatment of klebsiella with oral agent as step down after 7/3 and  I anticipate  lengthy oral vancomycin taper over 6-8 weeks;    currently he is refusing placement          Usman Dong MD  7/1/2020

## 2020-07-01 NOTE — PLAN OF CARE
Problem: Patient Care Overview  Goal: Plan of Care Review  Flowsheets (Taken 7/1/2020 3281)  Outcome Summary: Pt alert and oriented. Required increased motivation to participate in therapy. Pt inquired about purpose of therapy given pt choice to not pursue further rehab post hospitalization but rather return home. OT educated pt on purpose of therapy, potential risks and benefits. Emphasized need to progress bed mobility and t/f training in preparation for return home and caregiver burden. Pt anticipating arrival of breakfast, yet without AE (built up utensils) as pt reported spouse has for safe keeping. Emphasized need for availability at hospital for increased I in feeding given chronic numbness and coordination deficits. Reiterated importance of optimal position for reach, t/f and manipulation. Made cup w/ handles available. Issued initial HEP incorporating fine motor and hand strengthening tasks w/ pink sponge and theraputty. Pt completed 1 set x 10 reps of each exercise w/ continued motivational cues. Pt deferred any EOB sitting or change of position to recliner for upright sitting for meal. Will progress pt as pt willing while at hospital.

## 2020-07-01 NOTE — PROGRESS NOTES
Continued Stay Note  [facility]     Patient Name: Kendrick Crystal  MRN: [id number]  Today's Date: 7/1/2020    Admit Date: 6/7/2020    Discharge Plan     Row Name 07/01/20 1133       Plan    Plan  Home with home health    Plan Comments  Noted in pt's chart discharge plans have changed, pt now going home with home health. Visit made to pt, pt's wife present, pt reiterated does not want to go to rehab at a facility, wants to go home with home health. Reviewed hospice services with pt and wife should the time come that pt is no longer doing therapy and wants to focus on quality of life with comfort measures. [facility] brochure left with pt per pt's request. Please call [phone] if can be of further assistance.         Discharge Codes    No documentation.       Expected Discharge Date and Time     Expected Discharge Date Expected Discharge Time    Jul 3, 2020             Kelly Masters RN

## 2020-07-01 NOTE — PROGRESS NOTES
[facility] Medicine Services  PROGRESS NOTE    Patient Name: Kendrick Crystal  : 1970  MRN: [id number]    Date of Admission: 2020  Primary Care Physician: Stanley Carrasco DO    Subjective   Subjective     CC:  Difficulty urinating     HPI:  Patient is resting in bed in NAD. Slept well. Denies any pain. No acute events overnight per nursing. Pt declining rehab.    Review of Systems  Gen- No fevers, chills  CV- No chest pain, palpitations  Resp- No cough, dyspnea  GI- No N/V/D, abd pain         Objective   Objective     Vital Signs:   Temp:  [98 °F (36.7 °C)-98.3 °F (36.8 °C)] 98 °F (36.7 °C)  Heart Rate:  [76-86] 82  Resp:  [18] 18  BP: (123-146)/(70-86) 146/82        Physical Exam:  Constitutional: No acute distress, awake, alert  HENT: NCAT, mucous membranes moist  Respiratory: Clear to auscultation bilaterally, respiratory effort normal room air   Cardiovascular: RRR, no murmurs, rubs, or gallops, palpable pedal pulses bilaterally  Gastrointestinal: Positive bowel sounds, soft, nontender, nondistended  Musculoskeletal: LLE AKA, right LE 1+ edema   Psychiatric: Appropriate affect, cooperative  Neurologic: Oriented x 3, strength symmetric in all extremities, Cranial Nerves grossly intact to confrontation, speech clear  Skin: No rashes, dressings to RLE    childs to BSD       Results Reviewed:  Results from last 7 days   Lab Units 20   WBC 10*3/mm3 13.01* 12.42* 13.55*   HEMOGLOBIN g/dL 8.2* 7.9* 7.9*   HEMATOCRIT % 27.2* 26.2* 26.8*   PLATELETS 10*3/mm3 450 365 351     Results from last 7 days   Lab Units 20  0454   SODIUM mmol/L 139 138 137   POTASSIUM mmol/L 4.5 3.9 3.7   CHLORIDE mmol/L 103 102 104   CO2 mmol/L 26.0 29.0 28.0   BUN mg/dL 9 8 10   CREATININE mg/dL 0.53* 0.51* 0.49*   GLUCOSE mg/dL 157* 171* 167*   CALCIUM mg/dL 7.2* 8.0* 7.8*   ALT (SGPT) U/L  --  6  --    AST (SGOT) U/L  --  13   --      Estimated Creatinine Clearance: 218.1 mL/min (A) (by C-G formula based on SCr of 0.53 mg/dL (L)).    Microbiology Results Abnormal     Procedure Component Value - Date/Time    COVID PRE-OP / PRE-PROCEDURE SCREENING ORDER (NO ISOLATION) - Swab, Nasopharynx [[id number]] Collected:  06/26/20 0538    Lab Status:  Final result Specimen:  Swab from Nasopharynx Updated:  06/26/20 1136    Narrative:       The following orders were created for panel order COVID PRE-OP / PRE-PROCEDURE SCREENING ORDER (NO ISOLATION) - Swab, Nasopharynx.  Procedure                               Abnormality         Status                     ---------                               -----------         ------                     COVID-19,[facility] IN-HOUSE...[[id number]]  Normal              Final result                 Please view results for these tests on the individual orders.    COVID-19,[facility] IN-HOUSE, NP SWAB IN TRANSPORT MEDIA 8-12 HR TAT - Swab, Nasopharynx [[id number]]  (Normal) Collected:  06/26/20 0538    Lab Status:  Final result Specimen:  Swab from Nasopharynx Updated:  06/26/20 1136     COVID19 Not Detected    Narrative:       Fact sheet for providers: https://www.fda.gov/media/055188/download     Fact sheet for patients: https://www.fda.gov/media/854620/download    AFB Culture - Tissue, Prostate [[id number]] Collected:  06/18/20 1302    Lab Status:  Preliminary result Specimen:  Tissue from Prostate Updated:  06/25/20 1330     AFB Culture No AFB isolated at 1 week     AFB Stain No acid fast bacilli seen on concentrated smear    Fungus Culture - Tissue, Prostate [[id number]]  (Abnormal) Collected:  06/18/20 1302    Lab Status:  Preliminary result Specimen:  Tissue from Prostate Updated:  06/24/20 1936     Fungus Culture Moderate growth (3+) Yeast, Not Candida albicans    Anaerobic Culture - Tissue, Prostate [[id number]] Collected:  06/18/20 1302    Lab Status:  Final result Specimen:  Tissue from Prostate Updated:  06/23/20 0877        Anaerobic Culture No anaerobes isolated at 5 days    Tissue / Bone Culture - Tissue, Prostate [[id number]]  (Abnormal)  (Susceptibility) Collected:  06/18/20 1302    Lab Status:  Final result Specimen:  Tissue from Prostate Updated:  06/20/20 0940     Tissue Culture Rare Klebsiella pneumoniae ssp pneumoniae      Light growth (2+) Yeast, Not Candida albicans      Light growth (2+) Lactobacillus species     Comment: Lactobacillus species is a component of normal human sherly.  Clindamycin, Penicillin, and Ampicillin generally are the most active in vitro agents.  Resistance to Vancomycin and Aminoglycosides is reported.          Gram Stain Many (4+) WBCs seen      Few (2+) Gram positive bacilli      Few (2+) Gram positive cocci in pairs    Susceptibility      Klebsiella pneumoniae ssp pneumoniae     EYAD     Ampicillin Resistant     Ampicillin + Sulbactam Susceptible     Cefepime Susceptible     Ceftazidime Susceptible     Ceftriaxone Susceptible     Gentamicin Susceptible     Levofloxacin Susceptible     Piperacillin + Tazobactam Susceptible     Tetracycline Susceptible     Trimethoprim + Sulfamethoxazole Susceptible                Susceptibility Comments     Klebsiella pneumoniae ssp pneumoniae    Cefazolin sensitivity will not be reported for Enterobacteriaceae in non-urine isolates. If cefazolin is preferred, please call the microbiology lab to request an E-test.  With the exception of urinary-sourced infections, aminoglycosides should not be used as monotherapy.             Clostridium Difficile Toxin - Stool, Per Rectum [[id number]] Collected:  06/17/20 0930    Lab Status:  Final result Specimen:  Stool from Per Rectum Updated:  06/17/20 1114    Narrative:       The following orders were created for panel order Clostridium Difficile Toxin - Stool, Per Rectum.  Procedure                               Abnormality         Status                     ---------                               -----------         ------                      Clostridium Difficile To...[[id number]]  Abnormal            Final result                 Please view results for these tests on the individual orders.    Clostridium Difficile Toxin, PCR - Stool, Per Rectum [[id number]]  (Abnormal) Collected:  06/17/20 0930    Lab Status:  Final result Specimen:  Stool from Per Rectum Updated:  06/17/20 1114     C. Difficile Toxins by PCR Detected    Narrative:       Performance characteristics of test not established for patients <2 years of age.    Blood Culture - Blood, Hand, Left [[id number]] Collected:  06/08/20 0249    Lab Status:  Final result Specimen:  Blood from Hand, Left Updated:  06/13/20 0516     Blood Culture No growth at 5 days    Blood Culture - Blood, Hand, Left [[id number]] Collected:  06/08/20 0239    Lab Status:  Final result Specimen:  Blood from Hand, Left Updated:  06/13/20 0516     Blood Culture No growth at 5 days    Urine Culture - Urine, Urine, Catheter [[id number]]  (Abnormal)  (Susceptibility) Collected:  06/08/20 0048    Lab Status:  Final result Specimen:  Urine, Catheter Updated:  06/10/20 0227     Urine Culture >100,000 CFU/mL Klebsiella pneumoniae ssp pneumoniae    Susceptibility      Klebsiella pneumoniae ssp pneumoniae     EYAD     Ampicillin Resistant     Ampicillin + Sulbactam Susceptible     Cefazolin Susceptible     Cefepime Susceptible     Ceftazidime Susceptible     Ceftriaxone Susceptible     Gentamicin Susceptible     Levofloxacin Susceptible     Nitrofurantoin Intermediate     Piperacillin + Tazobactam Susceptible     Tetracycline Susceptible     Trimethoprim + Sulfamethoxazole Susceptible                    COVID PRE-OP / PRE-PROCEDURE SCREENING ORDER (NO ISOLATION) - Swab, Nasopharynx [[id number]] Collected:  06/08/20 0546    Lab Status:  Final result Specimen:  Swab from Nasopharynx Updated:  06/08/20 0654    Narrative:       The following orders were created for panel order COVID PRE-OP / PRE-PROCEDURE SCREENING ORDER  (NO ISOLATION) - Swab, Nasopharynx.  Procedure                               Abnormality         Status                     ---------                               -----------         ------                     COVID-19,[facility] IN-...[[id number]]  Normal              Final result                 Please view results for these tests on the individual orders.    COVID-19,[facility] IN-HOUSE(OR EMERGENT/ADD-ON),NP SWAB IN TRANSPORT MEDIA 3-4 HR TAT - Swab, Nasopharynx [[id number]]  (Normal) Collected:  06/08/20 0546    Lab Status:  Final result Specimen:  Swab from Nasopharynx Updated:  06/08/20 0654     COVID19 Not Detected          Imaging Results (Last 24 Hours)     ** No results found for the last 24 hours. **          Results for orders placed during the hospital encounter of 11/18/19   Adult Transthoracic Echo Complete W/ Cont if Necessary Per Protocol    Narrative · Estimated EF = 45%.  · Left atrial cavity size is mildly dilated.  · There is calcification of the aortic valve.  · Left ventricular systolic function is mildly decreased.          I have reviewed the medications:  Scheduled Meds:  amLODIPine 10 mg Oral Q24H   ampicillin-sulbactam 3 g Intravenous Q6H   aspirin 81 mg Oral Daily   fentaNYL 1 patch Transdermal Q72H   And      Check Fentanyl Patch Placement 1 each Does not apply Q12H   DULoxetine 60 mg Oral Daily   gabapentin 100 mg Oral Daily   gabapentin 300 mg Oral Nightly   heparin (porcine) 5,000 Units Subcutaneous Q8H   hydrALAZINE 10 mg Oral Q8H   insulin detemir 5 Units Subcutaneous Q12H   insulin lispro 0-9 Units Subcutaneous TID AC   insulin lispro 3 Units Subcutaneous TID With Meals   metoprolol tartrate 100 mg Oral Q12H   metroNIDAZOLE 500 mg Intravenous Q6H   micafungin (MYCAMINE)  mg Intravenous Q24H   miconazole 1 application Topical Q12H   miconazole  Topical Q12H   QUEtiapine 12.5 mg Oral Nightly   senna-docusate sodium 1 tablet Oral Nightly   sodium chloride 10 mL Intravenous  Q12H   terazosin 10 mg Oral Nightly   vancomycin 250 mg Oral Q6H     Continuous Infusions:   PRN Meds:.•  alteplase (CATHFLO) INTRACATHETER injection  •  calcium gluconate IVPB **AND** calcium gluconate IVPB **AND** Calcium, Ionized  •  dextrose  •  dextrose  •  docusate sodium  •  glucagon (human recombinant)  •  hydrALAZINE  •  Lidocaine HCl Urethral/Mucosal 2%  •  magnesium sulfate **OR** magnesium sulfate **OR** magnesium sulfate  •  melatonin  •  ondansetron  •  oxyCODONE  •  potassium chloride **OR** potassium chloride **OR** potassium chloride    Assessment/Plan   Assessment & Plan     Active Hospital Problems    Diagnosis  POA   • **Prostate abscess [N41.2]  Yes   • Clostridium difficile colitis [A04.72]  Unknown   • Fall [W19.XXXA]  Yes   • Abscess [L02.91]  Yes   • Left thigh pain [M79.652]  Yes   • Wound of right leg [S81.801A]  Yes   • Acute suppurative prostatitis  [N41.0]  Yes   • Diabetic ketoacidosis without coma associated with type 2 diabetes mellitus (CMS/HCC) [E11.10]  Yes   • Medical non-compliance [Z91.19]  Not Applicable   • Bipolar disorder (CMS/HCC) [F31.9]  Yes   • History of Clostridioides difficile colitis [Z86.19]  Not Applicable   • Acute UTI (urinary tract infection) [N39.0]  Yes   • Leukocytosis [D72.829]  Yes   • Sepsis (CMS/HCC) [A41.9]  Yes   • Peripheral vascular disease (CMS/HCC) [I73.9]  Yes   • S/P BKA (below knee amputation), left (CMS/HCC) [Z89.512]  Not Applicable   • Type 2 diabetes mellitus with circulatory disorder and uncontrolled [E11.59]  Yes   • Essential hypertension [I10]  Yes   • 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        Brief Hospital Course to date:  Kendrick Crystal is a 49 y.o. male with PMHx significant for uncontrolled DMII with neuropathy, HLD, HTN, DUNLAP cirrhosis, PVD s/p L BKA, BPH, perirectal abscess in October 2019, and perinephric abscess in October 2019 who  presents with sepsis w/ ct imaging concerning for prostate abscess. Due to previous history of perirectal abscess, Dr. Payne performed flex sigmoidoscopy on 6/11/20 which showed well healing rectal incision. Urology also followed. MR pelvis on 6/17/20 confirmed extensive prostate/periprostatic abscess w/ small multilocular abscess which was not percutaneously drainable w/ left greater than right adductor muscle edema and mild gluteal edema (but no abscess). Due to development of severe diarrhea, c.diff pcr was sent on 6/17/20 which was positive. Dr. Paez of urology consulted and ultimately performed cystoscopy w/ transurethral resection of prostatic abscess on 6/18/20.     This patient's problems and plans were partially entered by my partner and updated as appropriate by me 07/01/20.     Sepsis (leukocytosis, tachycardia), resolved  Prostate Abscess/UTI  -- Continue Unasyn/Flagyl, oral vanco, IV Mycamine.  Per ID will require 2-4 more weeks of antibiotics.    --Patient has failed multiple courses of PO antibiotics, and home health infusion is not an option.  Declined bed at [place]  --Continue F/C for 1-2 weeks per urology note 6/26     C diff colitis:  --Continue PO vanco and IV flagyl  --was constipated after starting treatment and stool softeners started, now with loose stools per nursing.  Will decrease frequency of stool softeners to once daily     S/P DKA (now resolved)  Uncontrolled T2DM (A1C 16.6)  --Continue prandial insulin and SSI  --continue  low dose levemir  --Accu checks    Encephalopathy (resolved)  --multifactorial including sepsis and bipolar disorder     Hx DUNLAP, Hx LBKA     AoCD  --Stable     Depression and question of bipolar affective disorder  --Continue seroquel, cymbalta  --does not follow with psychiatry       DVT Prophylaxis:  Sq heparin   Disposition: I expect the patient to be discharged possibly Friday after abx complete, declining rehab     CODE STATUS:   Code Status and Medical  Interventions:   Ordered at: 06/15/20 1640     Limited Support to NOT Include:    Intubation    Cardioversion/Defibrillation    Dialysis    Vasopressors     Level Of Support Discussed With:    Patient    Next of Kin (If No Surrogate)     Code Status:    No CPR     Medical Interventions (Level of Support Prior to Arrest):    Limited         Electronically signed by CHINTAN Richardson, 07/01/20, 09:42.

## 2020-07-02 NOTE — PROGRESS NOTES
[facility] Medicine Services  PROGRESS NOTE    Patient Name: Kendrick Crystal  : 1970  MRN: [id number]    Date of Admission: 2020  Primary Care Physician: Stanley Carrasco DO    Subjective   Subjective     CC:  Difficulty urinating     HPI:  Patient is resting in bed in NAD. States he hasn't got breakfast yet. Denies any soa. Denies any pain.     Review of Systems  Gen- No fevers, chills  CV- No chest pain, palpitations  Resp- No cough, dyspnea  GI- No N/V/D, abd pain         Objective   Objective     Vital Signs:   Temp:  [97.9 °F (36.6 °C)-98.4 °F (36.9 °C)] 97.9 °F (36.6 °C)  Heart Rate:  [] 76  Resp:  [18-20] 18  BP: (137-156)/(77-86) 156/84        Physical Exam:  Constitutional: No acute distress, awake, alert  HENT: NCAT, mucous membranes moist  Respiratory: Clear to auscultation bilaterally, respiratory effort normal room air 92%  Cardiovascular: RRR, no murmurs, rubs, or gallops, palpable pedal pulses bilaterally  Gastrointestinal: Positive bowel sounds, soft, nontender, nondistended  Musculoskeletal: LLE AKA with mod edema,  right LE 1+ pitting edema slightly improved today   Psychiatric: Appropriate affect, cooperative  Neurologic: Oriented x 3, strength symmetric in all extremities, Cranial Nerves grossly intact to confrontation, speech clear  Skin: No rashes, dressings to RLE, DANA picc line    childs to BSD       Results Reviewed:  Results from last 7 days   Lab Units 20  0543 20  0521   WBC 10*3/mm3 13.61* 13.01* 12.42*   HEMOGLOBIN g/dL 8.0* 8.2* 7.9*   HEMATOCRIT % 26.5* 27.2* 26.2*   PLATELETS 10*3/mm3 471* 450 365     Results from last 7 days   Lab Units 20  0543 20  0339 20  0521   SODIUM mmol/L 135* 139 138   POTASSIUM mmol/L 3.8 4.5 3.9   CHLORIDE mmol/L 101 103 102   CO2 mmol/L 25.0 26.0 29.0   BUN mg/dL 11 9 8   CREATININE mg/dL 0.53* 0.53* 0.51*   GLUCOSE mg/dL 163* 157* 171*   CALCIUM mg/dL 7.6* 7.2*  8.0*   ALT (SGPT) U/L 7  --  6   AST (SGOT) U/L 11  --  13   PROBNP pg/mL 4,035.0*  --   --      Estimated Creatinine Clearance: 218.1 mL/min (A) (by C-G formula based on SCr of 0.53 mg/dL (L)).    Microbiology Results Abnormal     Procedure Component Value - Date/Time    COVID PRE-OP / PRE-PROCEDURE SCREENING ORDER (NO ISOLATION) - Swab, Nasopharynx [[id number]] Collected:  06/26/20 0538    Lab Status:  Final result Specimen:  Swab from Nasopharynx Updated:  06/26/20 1136    Narrative:       The following orders were created for panel order COVID PRE-OP / PRE-PROCEDURE SCREENING ORDER (NO ISOLATION) - Swab, Nasopharynx.  Procedure                               Abnormality         Status                     ---------                               -----------         ------                     COVID-19,[facility] IN-HOUSE...[[id number]]  Normal              Final result                 Please view results for these tests on the individual orders.    COVID-19,[facility] IN-HOUSE, NP SWAB IN TRANSPORT MEDIA 8-12 HR TAT - Swab, Nasopharynx [[id number]]  (Normal) Collected:  06/26/20 0538    Lab Status:  Final result Specimen:  Swab from Nasopharynx Updated:  06/26/20 1136     COVID19 Not Detected    Narrative:       Fact sheet for providers: https://www.fda.gov/media/841626/download     Fact sheet for patients: https://www.fda.gov/media/944521/download    AFB Culture - Tissue, Prostate [[id number]] Collected:  06/18/20 1302    Lab Status:  Preliminary result Specimen:  Tissue from Prostate Updated:  06/25/20 1330     AFB Culture No AFB isolated at 1 week     AFB Stain No acid fast bacilli seen on concentrated smear    Fungus Culture - Tissue, Prostate [[id number]]  (Abnormal) Collected:  06/18/20 1302    Lab Status:  Preliminary result Specimen:  Tissue from Prostate Updated:  06/24/20 1936     Fungus Culture Moderate growth (3+) Yeast, Not Candida albicans    Anaerobic Culture - Tissue, Prostate [[id number]] Collected:  06/18/20  1302    Lab Status:  Final result Specimen:  Tissue from Prostate Updated:  06/23/20 0827     Anaerobic Culture No anaerobes isolated at 5 days    Tissue / Bone Culture - Tissue, Prostate [[id number]]  (Abnormal)  (Susceptibility) Collected:  06/18/20 1302    Lab Status:  Final result Specimen:  Tissue from Prostate Updated:  06/20/20 0940     Tissue Culture Rare Klebsiella pneumoniae ssp pneumoniae      Light growth (2+) Yeast, Not Candida albicans      Light growth (2+) Lactobacillus species     Comment: Lactobacillus species is a component of normal human sherly.  Clindamycin, Penicillin, and Ampicillin generally are the most active in vitro agents.  Resistance to Vancomycin and Aminoglycosides is reported.          Gram Stain Many (4+) WBCs seen      Few (2+) Gram positive bacilli      Few (2+) Gram positive cocci in pairs    Susceptibility      Klebsiella pneumoniae ssp pneumoniae     EYAD     Ampicillin Resistant     Ampicillin + Sulbactam Susceptible     Cefepime Susceptible     Ceftazidime Susceptible     Ceftriaxone Susceptible     Gentamicin Susceptible     Levofloxacin Susceptible     Piperacillin + Tazobactam Susceptible     Tetracycline Susceptible     Trimethoprim + Sulfamethoxazole Susceptible                Susceptibility Comments     Klebsiella pneumoniae ssp pneumoniae    Cefazolin sensitivity will not be reported for Enterobacteriaceae in non-urine isolates. If cefazolin is preferred, please call the microbiology lab to request an E-test.  With the exception of urinary-sourced infections, aminoglycosides should not be used as monotherapy.             Clostridium Difficile Toxin - Stool, Per Rectum [[id number]] Collected:  06/17/20 0930    Lab Status:  Final result Specimen:  Stool from Per Rectum Updated:  06/17/20 1114    Narrative:       The following orders were created for panel order Clostridium Difficile Toxin - Stool, Per Rectum.  Procedure                               Abnormality          Status                     ---------                               -----------         ------                     Clostridium Difficile To...[[id number]]  Abnormal            Final result                 Please view results for these tests on the individual orders.    Clostridium Difficile Toxin, PCR - Stool, Per Rectum [[id number]]  (Abnormal) Collected:  06/17/20 0930    Lab Status:  Final result Specimen:  Stool from Per Rectum Updated:  06/17/20 1114     C. Difficile Toxins by PCR Detected    Narrative:       Performance characteristics of test not established for patients <2 years of age.    Blood Culture - Blood, Hand, Left [[id number]] Collected:  06/08/20 0249    Lab Status:  Final result Specimen:  Blood from Hand, Left Updated:  06/13/20 0516     Blood Culture No growth at 5 days    Blood Culture - Blood, Hand, Left [[id number]] Collected:  06/08/20 0239    Lab Status:  Final result Specimen:  Blood from Hand, Left Updated:  06/13/20 0516     Blood Culture No growth at 5 days    Urine Culture - Urine, Urine, Catheter [[id number]]  (Abnormal)  (Susceptibility) Collected:  06/08/20 0048    Lab Status:  Final result Specimen:  Urine, Catheter Updated:  06/10/20 0227     Urine Culture >100,000 CFU/mL Klebsiella pneumoniae ssp pneumoniae    Susceptibility      Klebsiella pneumoniae ssp pneumoniae     EYAD     Ampicillin Resistant     Ampicillin + Sulbactam Susceptible     Cefazolin Susceptible     Cefepime Susceptible     Ceftazidime Susceptible     Ceftriaxone Susceptible     Gentamicin Susceptible     Levofloxacin Susceptible     Nitrofurantoin Intermediate     Piperacillin + Tazobactam Susceptible     Tetracycline Susceptible     Trimethoprim + Sulfamethoxazole Susceptible                    COVID PRE-OP / PRE-PROCEDURE SCREENING ORDER (NO ISOLATION) - Swab, Nasopharynx [[id number]] Collected:  06/08/20 0546    Lab Status:  Final result Specimen:  Swab from Nasopharynx Updated:  06/08/20 0654    Narrative:        The following orders were created for panel order COVID PRE-OP / PRE-PROCEDURE SCREENING ORDER (NO ISOLATION) - Swab, Nasopharynx.  Procedure                               Abnormality         Status                     ---------                               -----------         ------                     COVID-19,[facility] IN-...[[id number]]  Normal              Final result                 Please view results for these tests on the individual orders.    COVID-19,[facility] IN-HOUSE(OR EMERGENT/ADD-ON),NP SWAB IN TRANSPORT MEDIA 3-4 HR TAT - Swab, Nasopharynx [[id number]]  (Normal) Collected:  06/08/20 0546    Lab Status:  Final result Specimen:  Swab from Nasopharynx Updated:  06/08/20 0654     COVID19 Not Detected          Imaging Results (Last 24 Hours)     ** No results found for the last 24 hours. **          Results for orders placed during the hospital encounter of 11/18/19   Adult Transthoracic Echo Complete W/ Cont if Necessary Per Protocol    Narrative · Estimated EF = 45%.  · Left atrial cavity size is mildly dilated.  · There is calcification of the aortic valve.  · Left ventricular systolic function is mildly decreased.          I have reviewed the medications:  Scheduled Meds:    amLODIPine 10 mg Oral Q24H   ampicillin-sulbactam 3 g Intravenous Q6H   aspirin 81 mg Oral Daily   fentaNYL 1 patch Transdermal Q72H   And      Check Fentanyl Patch Placement 1 each Does not apply Q12H   DULoxetine 60 mg Oral Daily   gabapentin 100 mg Oral Daily   gabapentin 300 mg Oral Nightly   heparin (porcine) 5,000 Units Subcutaneous Q8H   hydrALAZINE 10 mg Oral Q8H   insulin detemir 5 Units Subcutaneous Q12H   insulin lispro 0-9 Units Subcutaneous TID AC   insulin lispro 3 Units Subcutaneous TID With Meals   metoprolol tartrate 100 mg Oral Q12H   metroNIDAZOLE 500 mg Intravenous Q6H   micafungin (MYCAMINE)  mg Intravenous Q24H   miconazole 1 application Topical Q12H   miconazole  Topical Q12H   QUEtiapine 12.5 mg  Oral Nightly   senna-docusate sodium 1 tablet Oral Nightly   sodium chloride 10 mL Intravenous Q12H   terazosin 10 mg Oral Nightly   vancomycin 250 mg Oral Q6H     Continuous Infusions:   PRN Meds:.•  alteplase (CATHFLO) INTRACATHETER injection  •  calcium gluconate IVPB **AND** calcium gluconate IVPB **AND** Calcium, Ionized  •  dextrose  •  dextrose  •  docusate sodium  •  glucagon (human recombinant)  •  hydrALAZINE  •  Lidocaine HCl Urethral/Mucosal 2%  •  magnesium sulfate **OR** magnesium sulfate **OR** magnesium sulfate  •  melatonin  •  ondansetron  •  oxyCODONE  •  potassium chloride **OR** potassium chloride **OR** potassium chloride    Assessment/Plan   Assessment & Plan     Active Hospital Problems    Diagnosis  POA   • **Prostate abscess [N41.2]  Yes   • Clostridium difficile colitis [A04.72]  Unknown   • Fall [W19.XXXA]  Yes   • Abscess [L02.91]  Yes   • Left thigh pain [M79.652]  Yes   • Wound of right leg [S81.801A]  Yes   • Acute suppurative prostatitis  [N41.0]  Yes   • Diabetic ketoacidosis without coma associated with type 2 diabetes mellitus (CMS/HCC) [E11.10]  Yes   • Medical non-compliance [Z91.19]  Not Applicable   • Bipolar disorder (CMS/HCC) [F31.9]  Yes   • History of Clostridioides difficile colitis [Z86.19]  Not Applicable   • Acute UTI (urinary tract infection) [N39.0]  Yes   • Leukocytosis [D72.829]  Yes   • Sepsis (CMS/HCC) [A41.9]  Yes   • Peripheral vascular disease (CMS/HCC) [I73.9]  Yes   • S/P BKA (below knee amputation), left (CMS/HCC) [Z89.512]  Not Applicable   • Type 2 diabetes mellitus with circulatory disorder and uncontrolled [E11.59]  Yes   • Essential hypertension [I10]  Yes   • 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        Brief Hospital Course to date:  Kendrick Crystal is a 49 y.o. male with PMHx significant for uncontrolled DMII with neuropathy, HLD, HTN, DNULAP cirrhosis, PVD  s/p L BKA, BPH, perirectal abscess in October 2019, and perinephric abscess in October 2019 who presents with sepsis w/ ct imaging concerning for prostate abscess. Due to previous history of perirectal abscess, Dr. Payne performed flex sigmoidoscopy on 6/11/20 which showed well healing rectal incision. Urology also followed. MR pelvis on 6/17/20 confirmed extensive prostate/periprostatic abscess w/ small multilocular abscess which was not percutaneously drainable w/ left greater than right adductor muscle edema and mild gluteal edema (but no abscess). Due to development of severe diarrhea, c.diff pcr was sent on 6/17/20 which was positive. Dr. Paez of urology consulted and ultimately performed cystoscopy w/ transurethral resection of prostatic abscess on 6/18/20.     This patient's problems and plans were partially entered by my partner and updated as appropriate by me 07/02/20.     Sepsis (leukocytosis, tachycardia), resolved  Prostate Abscess/UTI  -- Continue Unasyn/Flagyl, oral vanco, IV Mycamine.  Per ID will require 2-4 more weeks of antibiotics.    --Patient has failed multiple courses of PO antibiotics, and home health infusion is not an option.  Declined bed at [facility]  --Continue F/C for 1-2 weeks per urology note 6/26  -- nurse talked with with Dr. Paez and he recommends to try bladder training tonight and if patient feels urge to void then take out childs if he is unable to void and has discomfort  then replace with a 18 Tamazight childs and follow up in one month with him  -- IVF's stopped on 7/1 due to LE edema      C diff colitis:  --Continue PO vanco and IV flagyl  --was constipated after starting treatment and stool softeners started, now with loose stools per nursing.  stool softeners stopped      S/P DKA (now resolved)  Uncontrolled T2DM (A1C 16.6)  --Continue prandial insulin and SSI  --continue  low dose levemir  --Accu checks    Encephalopathy (resolved)  --multifactorial including sepsis and bipolar  disorder     Hx DUNLAP, Hx LBKA     AoCD  --Stable     Depression and question of bipolar affective disorder  --Continue seroquel, cymbalta  --does not follow with psychiatry     BNP elevated got 40 mg of IV lasix overnight With good urine output and IV fluids was stopped on 7/1. About 600ml in childs during assessment.  Patient on room air and denies any resp symptoms   Tried to call wife but no answer     DVT Prophylaxis:  Sq heparin   Disposition: I expect the patient to be discharged possibly Friday after abx complete, declining rehab     CODE STATUS:   Code Status and Medical Interventions:   Ordered at: 06/15/20 1640     Limited Support to NOT Include:    Intubation    Cardioversion/Defibrillation    Dialysis    Vasopressors     Level Of Support Discussed With:    Patient    Next of Kin (If No Surrogate)     Code Status:    No CPR     Medical Interventions (Level of Support Prior to Arrest):    Limited         Electronically signed by CHINTAN Richardson, 07/02/20, 11:17.

## 2020-07-02 NOTE — PROGRESS NOTES
Continued Stay Note  [facility]     Patient Name: Knedrick Crystal  MRN: [id number]  Today's Date: 7/2/2020    Admit Date: 6/7/2020    Discharge Plan     Row Name 07/02/20 1526       Plan    Plan  Home with home health    Plan Comments  I have spoken with patient's wife Cindy regarding home plans tomorrow and she is fine with this. I have given home health orders to Nicolas Hernandez RN with [facility] [place]. I have notified home infusion of oral vancomycin that will need to be delivered prior to discharge. [facility] arranged for 3:30 tomorrow. Time is not adjustable for transport. I have asked Ciera Tucker MSWILIAM to file an APS tomorrow.     7/3/2020 @ 1545: Patient was planning home today with [facility] for 15:30. I have spoken with Dr. Yousif this afternoon. Patient now not going home. Patient has medicaid waiver which provides 40 hours/week of in home care. Dr. Yousif tells me this person is not able to help this week end. He should be back on Monday. Patient should plan for discharge on Monday. [facility] arranged for 14:00 Monday.         Final Discharge Disposition Code  06 - home with home health care        Discharge Codes    No documentation.       Expected Discharge Date and Time     Expected Discharge Date Expected Discharge Time    Jul 3, 2020             Brenda Moscoso RN

## 2020-07-02 NOTE — NURSING NOTE
"Pt is refusing rehab because he is afraid and scared  of catching Covid and states \"[place] has pt's with Covid\".  He also states \" All nursing homes in [place] have Covid\".  Tried educating pt that not all rehab and nursing homes have Covid.  If we could have someone talk to him and reassure him he will not get Covid I believe he would go to rehab  "

## 2020-07-02 NOTE — PROGRESS NOTES
[facility] Progress Note        Antibiotics:  Anti-Infectives (From admission, onward)    Ordered     Dose/Rate Route Frequency Start Stop    06/27/20 1525  metroNIDAZOLE (FLAGYL) 500 mg/100mL IVPB     Jael Barbosa [facility] reviewed the order on 06/29/20 1401.   Ordering Provider:  Usman Dong MD    500 mg  over 60 Minutes Intravenous Every 6 Hours Scheduled 06/27/20 1615 07/07/20 2059    06/26/20 0716  micafungin 100 mg/100 mL 0.9% NS IVPB (mbp)     Usman Dong MD reviewed the order on 07/02/20 0553.   Ordering Provider:  Usman Dong MD    100 mg  over 60 Minutes Intravenous Every 24 Hours 06/26/20 1200 07/05/20 0859    06/22/20 1814  ampicillin-sulbactam 3 g/100 mL 0.9% NS (MBP)     Jael Barbosa [facility] reviewed the order on 06/23/20 0949.   Ordering Provider:  Usman Dong MD    3 g Intravenous Every 6 Hours 06/22/20 2000 07/06/20 1959    06/17/20 1147  metroNIDAZOLE (FLAGYL) 500 mg/100mL IVPB     Jael Barbosa [facility] reviewed the order on 06/18/20 0937.   Ordering Provider:  Mumtaz Paez MD    500 mg  over 60 Minutes Intravenous Every 6 Hours 06/17/20 1400 06/27/20 0941    06/17/20 1147  vancomycin oral solution reconstituted 250 mg     Usman Dong MD reviewed the order on 06/26/20 0716.   Ordering Provider:  Usman Dong MD    250 mg Oral Every 6 Hours Scheduled 06/17/20 1245 07/11/20 1159    06/09/20 0916  micafungin 100 mg/100 mL 0.9% NS IVPB (mbp)     Ordering Provider:  Usman Dong MD    100 mg  over 60 Minutes Intravenous Once 06/09/20 1015 06/09/20 1223    06/08/20 0453  vancomycin 2750 mg/500 mL 0.9% NS IVPB ([facility])     Ordering Provider:  Usman Mazariegos [facility]    25 mg/kg × 107 kg  over 180 Minutes Intravenous Once 06/08/20 0545 06/08/20 0846    06/08/20 0446  piperacillin-tazobactam (ZOSYN) 3.375 g in iso-osmotic dextrose 50 ml (premix)     Ordering Provider:  Imelda Paige APRN    3.375 g  over 30 Minutes Intravenous Once 06/08/20 0530 06/08/20 0604     20 0138  cefTRIAXone (ROCEPHIN) 1 g/50 mL 0.9% NS VTB (mbp)     Ordering Provider:  Kendrick Sotelo MD    1 g  over 30 Minutes Intravenous Once 20 0140 20 0218          CC: nausea    HPI:  Patient is a 49 y.o.  Yr old male with history of poorly contolled T2DM, DUNLAP/liver cirrhosis, HTN, PVD/Left BKA, and multiple skin abscesses/MRSA who presented to [facility] ED with right shin wound infection summer 2018.  He was unable to get to see Dr. Martinez as his father passed away unexpectedly on 18 and he had to wait until after the . The wound worsened becoming gangrenous and he came to [facility] ED in 2018.  He also had been hospitalized at [facility] and then transferred to  for a severe penis/scrotum infection requiring surgical debridement in summer 2018.  He received antibiotics regarding his right leg infection, generally improved over the remainder of summer 2018 but that area had not completely healed. He was readmitted 2019 with acute left lower abdominal wall redness/swelling and pain, spontaneous drainage associated with fever/chills and uncontrolled blood sugars.   I&D requiring wide debridement , severe/deep infection and transferred to [facility] on May 3, 2019 with IV Zosyn/oral doxycycline. Cultures had lactobacillus and mixed gram-positive skin sherly including alpha strep, staph epidermidis and corynebacterium. Readmitted to [facility] May 18, 2019, increasing generalized edema ultimately transitioned to oral doxycycline and healed.     Readmitted 2019 with acute rectal pain that had begun ; this is associated with constipation for days, chills and nausea/dry heaving.  White blood cell count elevated, high hemoglobin A1c over 13, urinalysis with hematuria/pyuria and CT scan with 3 x 3 cm fluid collection anterior to the distal rectum and per discussion with Dr. Akbar/Jennifer, this was not in the prostate.  " Colorectal surgery/urology saw him for consideration of surgical options/timing/threshold, etc..     8/2/19 I&Dper Dr Payne perirectal abscess; patient reports that he had instrumented his rectum prior to hospitalization with enema     8/3/19 urinary retention overnight requiring in/out catheterization per patient.  Creat climb     8/4/19 further creat climb; childs placed and lisinopril stopped and d/w Dr Rubio;  ?obstruction initially associated with retention postop (1200 out with I/O cath postop per nursing) -v- contrast from CT -v- lisinopril/medication/vancomycin -v- relative hypotension -v- likely combination of factors with ATN; nephrology following; vancomycin trough high and vancomycin stopped empirically 8/3 although high trough potentially accumulation as a consequence of diminishing renal function associated with retention/contrast/pressure rather than cause.  Nonetheless, opted to avoid at that time; creatinine trending down.      8/7/19 in retrospect he remembers air in his urine at admit which has raised concern for possible fistula from urinary tract;  No fecaluria and culture from abscess is fecal sherly;  ?rectal-urethral fistula;  Dr Payne aware     Culture with E. coli/Klebsiella species and creatinine generally trended down, transitioned to oral antibiotics at discharge.     Readmitted August 17, 2019 with nausea/vomiting/dry heaves for 3 days.  Childs catheter was placed and CT scan of the abdomen showed:      \"Previously seen fluid collection identified inferior to the prostate gland is more increased in density on today's examination. There is wall thickening again seen throughout the bladder. Findings again are concerning for cystitis.\" per radiology     He was transitioned to oral omnicef/topical antifungal on approx 8/23/19; Noncompliant with f/u in our office     READMITTED 10/8/19 RLQ abd pain, urinary retention, nausea, dysuria and generalized fatigue     UTI by U/A, subsequent blood " cultures positive for  kleb sp, urine with kleb and e coli ;  Abnormal CT abd with right perinephric fluid collection, advanced cirrhosis, urinary bladder thickening.  10/9 Aspirate of perinephric fluid collection consistent with abscess/kleb and white blood cells; 10/16/19 cytoscopy with bullous change per Dr Gutierres but no fistula per him; 10/19/19 intermittent nausea, no vomiting or dry heaves this am, intermittent dry cough broadened to zosyn with ?aspiration pneumonia evolving after dry heaves/vomiting and had intermittent diarrhea, oral vancomycin added empirically with history of prior C. Difficile; improved with IV Zosyn/oral vancomycin and he refused consideration of placement.  He insisted on home, discharged October 23 and noncompliant with outpatient therapy and outpatient follow-up October 25.  He had become fatigued such that his family could not assist him out of bed and it was recommended by my office that he return to the emergency room October 25.  He apparently refused that but did come to the emergency room October 26.     READMITTED October 26, 2019 with generalized weakness, fatigue/malaise and nausea/vomiting/diarrhea.  CT scan revealed increased size of perinephric fluid collection per radiology. 10/29 drain placed; 11/5/19 drain inadvertently out overnight per him;11/8 voiding cystourethrogram per urology; I d/w Dr Escobedo 11/11 and he reviewed the US from 11/8 and he reports to me US is adequate for followup on this and NO current evidence for abscess, no need for CT scan at that time to evaluate abscess per d/w him and given clinical improvements/radiographic improvements; finished abx prior to discharge 11/14 and noncompliant with physical therapy, he refused placement and insisted on home.     READMITTED 11/18/19 with multifactorial complaints.  He reported generalized fatigue and inability to move himself around, increased dyspnea with dry cough and dry heaves, urinary frequency/retention  "and small amounts of urination; noted to have leukocytosis, elevated troponin/BNP, with hematuria/pyuria and bacteriuria/small yeast; chest x-ray with increased pulmonary vascularity bilaterally and ill-defined opacification left lung base with small left pleural effusion per radiology.  CT abdomen with large pleural effusions with dependent airspace disease, likely atelectasis per radiology and limited ability to evaluate for right sided perinephric abscess per radiology     11/22 CT chest no focal consolidation per radiology; 11/23/19 JUAN after diuresis; later in November he was discharged with oral antibiotics but noncompliant with follow-up.     Readmitted June 7, 2020 with inability to urinate, dysuria and nausea/dry heaves with subjective fever.  Had severe leukocytosis with acute pyuria/hematuria concerning for UTI and subsequent urinary culture with gram-negative lon.  CT scan of the abdomen/pelvis with concern for acute suppurative prostatitis and possible prostatic abscess per radiology in addition to soft tissue thickening extending to the left lower pelvic sidewall.  No evidence for urinary tract obstruction with indwelling Walker catheter.  No specific mention of perinephric collection and no specific focal bowel abnormality described per radiology     He had inability to urinate with hesitancy and dysuria.      6/11 flex sig by Dr Payne    6/18/20 surgery by Dr Paez:  \"PROCEDURE:  Cystoscopy with transurethral resection of prostatic abscess and prostate obtaining tissue culture and permanent pathology.\"      7/2/20 still some diarrhea but not worse; currently refusing placement; per nursing,  no new pain;   less nausea;  No vomiting;  No gross hematuria/pyuria and no flank pain.  He denies scrotal pain.  No headache photophobia or neck stiffness.  No shortness of breath cough or hemoptysis.  No diarrhea.  No rash.     He has a chronic right lower leg wound which is clean and improving.  No new redness or " "pain there     Chronic left stump pain which is constant, sharp at times, nonradiating, worse with pressure, generally better with pain meds and currently 4 out of 10.  No new skin discoloration.  No open wound or active drainage.     ROS:      7/2/20 No f/c/s.  No rash. No new ADR to Abx.     Constitutional-- No chills or sweats.  Appetite diminished with fatigue Heent-- No new vision, hearing or throat complaints.  No epistaxis or oral sores.  Denies odynophagia or dysphagia.  No flashers, floaters or eye pain. No odynophagia or dysphagia. No headache, photophobia or neck stiffness.  CV-- No chest pain, palpitation or syncope  Resp-- No SOB/cough/Hemoptysis  GI-  No hematochezia, melena, or hematemesis. Denies jaundice or chronic liver disease.  --as above  Lymph- no swollen lymph nodes in neck/axilla or groin.   Heme- No active bruising or bleeding; no Hx of DVT or PE.  MS-- no swelling or pain in the bones or joints of arms/legs aside from above.  No new back pain.  Neuro-- No acute focal weakness or numbness in the arms or legs.  No seizures.     Full 12 point review of systems reviewed and negative otherwise for acute complaints, except for above       PE:   /84 (BP Location: Right arm, Patient Position: Lying)   Pulse 76   Temp 98.4 °F (36.9 °C) (Oral)   Resp 18   Ht 190.5 cm (75\")   Wt 107 kg (235 lb)   SpO2 (!) 88%   BMI 29.37 kg/m²     GENERAL: sleepy, in no acute distress.  Chronically ill  HEENT: Normocephalic, atraumatic.  PERRL. EOMI. No conjunctival injection. No icterus. Oropharynx clear without evidence of thrush or exudate. No evidence of peridontal disease.    NECK: Supple without nuchal rigidity. No mass.  LYMPH: No cervical, axillary or inguinal lymphadenopathy.  HEART: RRR; No murmur, rubs, gallops.   LUNGS: Diminished at bases to auscultation bilaterally without wheezing, rales, rhonchi. Normal respiratory effort. Nonlabored. No dullness.  ABDOMEN: Soft, mildly tender, " nondistended. Positive bowel sounds. No rebound or guarding. NO mass or HSM.  EXT:  No cyanosis, clubbing or edema. No cord.  MSK: FROM without joint effusions noted arms/legs.    SKIN: Warm and dry without cutaneous eruptions on Inspection/palpation.    NEURO: sleepy    No peripheral stigmata/phenomena of endocarditis     Right lower leg with clean wound through the dermis to the soft tissue but no probe to bone tendon joint or ligament.  No purulence and no surrounding redness induration or warmth.  No mass bulge or fluctuance.     Left amputation site with no open wound or active drainage.  No redness induration or warmth.  No mass bulge or fluctuance.  No crepitus or bulla.       Laboratory Data    Results from last 7 days   Lab Units 07/02/20  0543 07/01/20  0339 06/29/20  0521   WBC 10*3/mm3 13.61* 13.01* 12.42*   HEMOGLOBIN g/dL 8.0* 8.2* 7.9*   HEMATOCRIT % 26.5* 27.2* 26.2*   PLATELETS 10*3/mm3 471* 450 365     Results from last 7 days   Lab Units 07/01/20  0339   SODIUM mmol/L 139   POTASSIUM mmol/L 4.5   CHLORIDE mmol/L 103   CO2 mmol/L 26.0   BUN mg/dL 9   CREATININE mg/dL 0.53*   GLUCOSE mg/dL 157*   CALCIUM mg/dL 7.2*     Results from last 7 days   Lab Units 06/29/20  0521   ALK PHOS U/L 102   BILIRUBIN mg/dL 0.2   ALT (SGPT) U/L 6   AST (SGOT) U/L 13               Estimated Creatinine Clearance: 218.1 mL/min (A) (by C-G formula based on SCr of 0.53 mg/dL (L)).      Microbiology:      Radiology:  Imaging Results (Last 72 Hours)     ** No results found for the last 72 hours. **            Impression:     --Acute sepsis.  Primary concern for urinary source.  Otherwise, chest clear/nonfocal with no cough.  No diarrhea although at risk for C. difficile relapse and will need to be monitored.  Right lower leg chronic wound is clean with no obvious cellulitis.  He has left stump pain but without obvious secondary cellulitis at present.  No meningismus.  Monitor for other potential foci     --Acute complicated  UTI with obstruction, prostatitis with  Abscess, Kleb Pneumoniae in urine culture.  MRI with abscess including extension to pelvic sidewall .   In addition, he had a complicated past history with concern for perirectal abscess in 2019 and underwent drainage in August with mixed fecal sherly.  I have discussed with Dr. Payne and he feels this is primarily prostatic issue so far rather than bowel/rectal.    **surgery 6/18 as above;  GS mixed and   IV Unasyn/mycamine ongoing;  Complex, deep seated process with concern kleb as primary pathogen in initially urine     --History Klebsiella septicemia and October-November 2019 with concern for urinary source and pyelonephritis associated with urinary retention and abnormal CT scan with perinephric collection/abscess including Klebsiella on aspirate.  See previous notes from 2019 admission regarding additional detail.  CT scan June 2020 with no description of perinephric collection per radiology report.     --History perirectal abscess 2019 seen previously by Dr. Payne.      --Acute/recurrent CDiff;  Oral vancomycin/IV flagyl ongoing;  IF significant ileus develops then you could consider rectal vancomycin enemas; I emphasized the importance of complying with oral vancomycin solution     --History acute renal failure August 2019 as outlined in prior notes.  Possible obstruction initially associated with postop retention, contrast from CT scan and potentially lisinopril/medication/vancomycin, relative hypotension but likely combination of these factors.  Vancomycin trough had been high and stopped empirically although high trough potentially and accumulation as consequence of diminishing renal function rather than cause.  Nonetheless.  We have attempted to avoid unless absolutely necessary.     --History MRSA     --Diabetes type 2.  Uncontrolled.  Medical noncompliance in general     --History DUNLAP/chronic liver disease and probable cirrhosis per past notes     --Left BKA with  chronic pain     --Chronic right lower leg wound appears clean without obvious secondary infection at present     --Peripheral vascular disease followed previously by Dr. Martinez     --Medical noncompliance; he voices understanding the negative impact and barrier to care caused by his noncompliance    PLAN:     --IV unasyn/flagyl and oral vancomycin, IV mycamine    -- urologic surgery 6/18     --Check/review labs cultures and scans     --flex sig 6/11     --Discussed with microbiology     --Partial history per nursing and family     --Highly complex set of issues with high risk for further serious morbidity and other serious sequela    **Urine culture similar to 2019 so far; ?recurrence -v- persistent urologic nidus    **d/w Dr Zarate ; his noncompliance is a barrier to outpatient care; ?placement options    **duration of abx to depend on  clinical course/tolerability, patient compliance etc.. I anticipate at least 14 days antifungal and coverage for lactobacillus ,  which will be 7/3 (both isolated at surgery but not initially in urine and significance unclear but covered, with concern for klebsiella as dominant pathogen given past hospitalizations and culture at admit); potentially longer duration for treatment of klebsiella with oral agent as step down after 7/3 and  I anticipate  lengthy oral vancomycin taper over 6-8 weeks;    currently he is refusing placement          Usman Dong MD  7/2/2020

## 2020-07-03 NOTE — PROGRESS NOTES
[facility] Progress Note        Antibiotics:  Anti-Infectives (From admission, onward)    Ordered     Dose/Rate Route Frequency Start Stop    06/27/20 1525  metroNIDAZOLE (FLAGYL) 500 mg/100mL IVPB     Jael Barbosa [facility] reviewed the order on 06/29/20 1401.   Ordering Provider:  Usman Dong MD    500 mg  over 60 Minutes Intravenous Every 6 Hours Scheduled 06/27/20 1615 07/07/20 2059    06/26/20 0716  micafungin 100 mg/100 mL 0.9% NS IVPB (mbp)     Usman Dong MD reviewed the order on 07/02/20 0553.   Ordering Provider:  Usman Dong MD    100 mg  over 60 Minutes Intravenous Every 24 Hours 06/26/20 1200 07/05/20 0859    06/22/20 1814  ampicillin-sulbactam 3 g/100 mL 0.9% NS (MBP)     Jael Barbosa [facility] reviewed the order on 06/23/20 0949.   Ordering Provider:  Usman Dong MD    3 g Intravenous Every 6 Hours 06/22/20 2000 07/06/20 1959    06/17/20 1147  metroNIDAZOLE (FLAGYL) 500 mg/100mL IVPB     Jael Barbosa [facility] reviewed the order on 06/18/20 0937.   Ordering Provider:  Mumtaz Paez MD    500 mg  over 60 Minutes Intravenous Every 6 Hours 06/17/20 1400 06/27/20 0941    06/17/20 1147  vancomycin oral solution reconstituted 250 mg     Usman Dnog MD reviewed the order on 06/26/20 0716.   Ordering Provider:  Usman Dong MD    250 mg Oral Every 6 Hours Scheduled 06/17/20 1245 07/11/20 1159    06/09/20 0916  micafungin 100 mg/100 mL 0.9% NS IVPB (mbp)     Ordering Provider:  Usman Dong MD    100 mg  over 60 Minutes Intravenous Once 06/09/20 1015 06/09/20 1223    06/08/20 0453  vancomycin 2750 mg/500 mL 0.9% NS IVPB ([facility])     Ordering Provider:  Usman Mazariegos [facility]    25 mg/kg × 107 kg  over 180 Minutes Intravenous Once 06/08/20 0545 06/08/20 0846    06/08/20 0446  piperacillin-tazobactam (ZOSYN) 3.375 g in iso-osmotic dextrose 50 ml (premix)     Ordering Provider:  Imelda Paige APRN    3.375 g  over 30 Minutes Intravenous Once 06/08/20 0530 06/08/20 0604     20 0138  cefTRIAXone (ROCEPHIN) 1 g/50 mL 0.9% NS VTB (mbp)     Ordering Provider:  Kendrick Sotelo MD    1 g  over 30 Minutes Intravenous Once 20 0140 20 0218          CC: nausea    HPI:  Patient is a 49 y.o.  Yr old male with history of poorly contolled T2DM, DUNLAP/liver cirrhosis, HTN, PVD/Left BKA, and multiple skin abscesses/MRSA who presented to [facility] ED with right shin wound infection summer 2018.  He was unable to get to see Dr. Martinez as his father passed away unexpectedly on 18 and he had to wait until after the . The wound worsened becoming gangrenous and he came to [facility] ED in 2018.  He also had been hospitalized at [facility] and then transferred to  for a severe penis/scrotum infection requiring surgical debridement in summer 2018.  He received antibiotics regarding his right leg infection, generally improved over the remainder of summer 2018 but that area had not completely healed. He was readmitted 2019 with acute left lower abdominal wall redness/swelling and pain, spontaneous drainage associated with fever/chills and uncontrolled blood sugars.   I&D requiring wide debridement , severe/deep infection and transferred to [facility] on May 3, 2019 with IV Zosyn/oral doxycycline. Cultures had lactobacillus and mixed gram-positive skin sherly including alpha strep, staph epidermidis and corynebacterium. Readmitted to [facility] May 18, 2019, increasing generalized edema ultimately transitioned to oral doxycycline and healed.     Readmitted 2019 with acute rectal pain that had begun ; this is associated with constipation for days, chills and nausea/dry heaving.  White blood cell count elevated, high hemoglobin A1c over 13, urinalysis with hematuria/pyuria and CT scan with 3 x 3 cm fluid collection anterior to the distal rectum and per discussion with Dr. Akbar/Jennifer, this was not in the prostate.  " Colorectal surgery/urology saw him for consideration of surgical options/timing/threshold, etc..     8/2/19 I&Dper Dr Payne perirectal abscess; patient reports that he had instrumented his rectum prior to hospitalization with enema     8/3/19 urinary retention overnight requiring in/out catheterization per patient.  Creat climb     8/4/19 further creat climb; childs placed and lisinopril stopped and d/w Dr Rubio;  ?obstruction initially associated with retention postop (1200 out with I/O cath postop per nursing) -v- contrast from CT -v- lisinopril/medication/vancomycin -v- relative hypotension -v- likely combination of factors with ATN; nephrology following; vancomycin trough high and vancomycin stopped empirically 8/3 although high trough potentially accumulation as a consequence of diminishing renal function associated with retention/contrast/pressure rather than cause.  Nonetheless, opted to avoid at that time; creatinine trending down.      8/7/19 in retrospect he remembers air in his urine at admit which has raised concern for possible fistula from urinary tract;  No fecaluria and culture from abscess is fecal sherly;  ?rectal-urethral fistula;  Dr Payne aware     Culture with E. coli/Klebsiella species and creatinine generally trended down, transitioned to oral antibiotics at discharge.     Readmitted August 17, 2019 with nausea/vomiting/dry heaves for 3 days.  Childs catheter was placed and CT scan of the abdomen showed:      \"Previously seen fluid collection identified inferior to the prostate gland is more increased in density on today's examination. There is wall thickening again seen throughout the bladder. Findings again are concerning for cystitis.\" per radiology     He was transitioned to oral omnicef/topical antifungal on approx 8/23/19; Noncompliant with f/u in our office     READMITTED 10/8/19 RLQ abd pain, urinary retention, nausea, dysuria and generalized fatigue     UTI by U/A, subsequent blood " cultures positive for  kleb sp, urine with kleb and e coli ;  Abnormal CT abd with right perinephric fluid collection, advanced cirrhosis, urinary bladder thickening.  10/9 Aspirate of perinephric fluid collection consistent with abscess/kleb and white blood cells; 10/16/19 cytoscopy with bullous change per Dr Gutierres but no fistula per him; 10/19/19 intermittent nausea, no vomiting or dry heaves this am, intermittent dry cough broadened to zosyn with ?aspiration pneumonia evolving after dry heaves/vomiting and had intermittent diarrhea, oral vancomycin added empirically with history of prior C. Difficile; improved with IV Zosyn/oral vancomycin and he refused consideration of placement.  He insisted on home, discharged October 23 and noncompliant with outpatient therapy and outpatient follow-up October 25.  He had become fatigued such that his family could not assist him out of bed and it was recommended by my office that he return to the emergency room October 25.  He apparently refused that but did come to the emergency room October 26.     READMITTED October 26, 2019 with generalized weakness, fatigue/malaise and nausea/vomiting/diarrhea.  CT scan revealed increased size of perinephric fluid collection per radiology. 10/29 drain placed; 11/5/19 drain inadvertently out overnight per him;11/8 voiding cystourethrogram per urology; I d/w Dr Escobedo 11/11 and he reviewed the US from 11/8 and he reports to me US is adequate for followup on this and NO current evidence for abscess, no need for CT scan at that time to evaluate abscess per d/w him and given clinical improvements/radiographic improvements; finished abx prior to discharge 11/14 and noncompliant with physical therapy, he refused placement and insisted on home.     READMITTED 11/18/19 with multifactorial complaints.  He reported generalized fatigue and inability to move himself around, increased dyspnea with dry cough and dry heaves, urinary frequency/retention  "and small amounts of urination; noted to have leukocytosis, elevated troponin/BNP, with hematuria/pyuria and bacteriuria/small yeast; chest x-ray with increased pulmonary vascularity bilaterally and ill-defined opacification left lung base with small left pleural effusion per radiology.  CT abdomen with large pleural effusions with dependent airspace disease, likely atelectasis per radiology and limited ability to evaluate for right sided perinephric abscess per radiology     11/22 CT chest no focal consolidation per radiology; 11/23/19 JUAN after diuresis; later in November he was discharged with oral antibiotics but noncompliant with follow-up.     Readmitted June 7, 2020 with inability to urinate, dysuria and nausea/dry heaves with subjective fever.  Had severe leukocytosis with acute pyuria/hematuria concerning for UTI and subsequent urinary culture with gram-negative lon.  CT scan of the abdomen/pelvis with concern for acute suppurative prostatitis and possible prostatic abscess per radiology in addition to soft tissue thickening extending to the left lower pelvic sidewall.  No evidence for urinary tract obstruction with indwelling Walker catheter.  No specific mention of perinephric collection and no specific focal bowel abnormality described per radiology     He had inability to urinate with hesitancy and dysuria.      6/11 flex sig by Dr Payne    6/18/20 surgery by Dr Paez:  \"PROCEDURE:  Cystoscopy with transurethral resection of prostatic abscess and prostate obtaining tissue culture and permanent pathology.\"      7/3/20 still some diarrhea but not worse;  currently refusing placement; per nursing,  no new pain;   less nausea;  No vomiting;  No gross hematuria/pyuria and no flank pain.  He denies scrotal pain.  No headache photophobia or neck stiffness.  No shortness of breath cough or hemoptysis.  No diarrhea.  No rash.     He has a chronic right lower leg wound which is clean and improving.  No new redness or " "pain there     Chronic left stump pain which is constant, sharp at times, nonradiating, worse with pressure, generally better with pain meds and currently 4 out of 10.  No new skin discoloration.  No open wound or active drainage.     ROS:      7/3/20 No f/c/s.  No rash. No new ADR to Abx.     Constitutional-- No chills or sweats.  Appetite diminished with fatigue Heent-- No new vision, hearing or throat complaints.  No epistaxis or oral sores.  Denies odynophagia or dysphagia.  No flashers, floaters or eye pain. No odynophagia or dysphagia. No headache, photophobia or neck stiffness.  CV-- No chest pain, palpitation or syncope  Resp-- No SOB/cough/Hemoptysis  GI-  No hematochezia, melena, or hematemesis. Denies jaundice or chronic liver disease.  --as above  Lymph- no swollen lymph nodes in neck/axilla or groin.   Heme- No active bruising or bleeding; no Hx of DVT or PE.  MS-- no swelling or pain in the bones or joints of arms/legs aside from above.  No new back pain.  Neuro-- No acute focal weakness or numbness in the arms or legs.  No seizures.     Full 12 point review of systems reviewed and negative otherwise for acute complaints, except for above       PE:   /99   Pulse 69   Temp 98 °F (36.7 °C) (Oral)   Resp 18   Ht 190.5 cm (75\")   Wt 107 kg (235 lb)   SpO2 93%   BMI 29.37 kg/m²     GENERAL: sleepy, in no acute distress.  Chronically ill  HEENT: Normocephalic, atraumatic.  PERRL. EOMI. No conjunctival injection. No icterus. Oropharynx clear without evidence of thrush or exudate. No evidence of peridontal disease.    NECK: Supple without nuchal rigidity. No mass.  LYMPH: No cervical, axillary or inguinal lymphadenopathy.  HEART: RRR; No murmur, rubs, gallops.   LUNGS: Diminished at bases to auscultation bilaterally without wheezing, rales, rhonchi. Normal respiratory effort. Nonlabored. No dullness.  ABDOMEN: Soft, mildly tender, nondistended. Positive bowel sounds. No rebound or guarding. NO " mass or HSM.  EXT:  No cyanosis, clubbing or edema. No cord.  MSK: FROM without joint effusions noted arms/legs.    SKIN: Warm and dry without cutaneous eruptions on Inspection/palpation.    NEURO: sleepy    No peripheral stigmata/phenomena of endocarditis     Right lower leg with clean wound through the dermis to the soft tissue but no probe to bone tendon joint or ligament.  No purulence and no surrounding redness induration or warmth.  No mass bulge or fluctuance.     Left amputation site with no open wound or active drainage.  No redness induration or warmth.  No mass bulge or fluctuance.  No crepitus or bulla.       Laboratory Data    Results from last 7 days   Lab Units 07/02/20  0543 07/01/20  0339 06/29/20  0521   WBC 10*3/mm3 13.61* 13.01* 12.42*   HEMOGLOBIN g/dL 8.0* 8.2* 7.9*   HEMATOCRIT % 26.5* 27.2* 26.2*   PLATELETS 10*3/mm3 471* 450 365     Results from last 7 days   Lab Units 07/02/20  0543   SODIUM mmol/L 135*   POTASSIUM mmol/L 3.8   CHLORIDE mmol/L 101   CO2 mmol/L 25.0   BUN mg/dL 11   CREATININE mg/dL 0.53*   GLUCOSE mg/dL 163*   CALCIUM mg/dL 7.6*     Results from last 7 days   Lab Units 07/02/20  0543   ALK PHOS U/L 111   BILIRUBIN mg/dL 0.2   ALT (SGPT) U/L 7   AST (SGOT) U/L 11               Estimated Creatinine Clearance: 218.1 mL/min (A) (by C-G formula based on SCr of 0.53 mg/dL (L)).      Microbiology:      Radiology:  Imaging Results (Last 72 Hours)     ** No results found for the last 72 hours. **            Impression:     --Acute sepsis.  Primary concern for urinary source.  Otherwise, chest clear/nonfocal with no cough.  No diarrhea although at risk for C. difficile relapse and will need to be monitored.  Right lower leg chronic wound is clean with no obvious cellulitis.  He has left stump pain but without obvious secondary cellulitis at present.  No meningismus.  Monitor for other potential foci     --Acute complicated UTI with obstruction, prostatitis with  Abscess, Kleb  Pneumoniae in urine culture.  MRI with abscess including extension to pelvic sidewall .   In addition, he had a complicated past history with concern for perirectal abscess in 2019 and underwent drainage in August with mixed fecal sherly.  I have discussed with Dr. Payne and he feels this is primarily prostatic issue so far rather than bowel/rectal.    **surgery 6/18 as above;  GS mixed and   IV Unasyn/mycamine ongoing;  Complex, deep seated process with concern kleb as primary pathogen in initially urine     --History Klebsiella septicemia and October-November 2019 with concern for urinary source and pyelonephritis associated with urinary retention and abnormal CT scan with perinephric collection/abscess including Klebsiella on aspirate.  See previous notes from 2019 admission regarding additional detail.  CT scan June 2020 with no description of perinephric collection per radiology report.     --History perirectal abscess 2019 seen previously by Dr. Payne.      --Acute/recurrent CDiff;  Oral vancomycin/IV flagyl ongoing;  IF significant ileus develops then you could consider rectal vancomycin enemas; I emphasized the importance of complying with oral vancomycin solution     --History acute renal failure August 2019 as outlined in prior notes.  Possible obstruction initially associated with postop retention, contrast from CT scan and potentially lisinopril/medication/vancomycin, relative hypotension but likely combination of these factors.  Vancomycin trough had been high and stopped empirically although high trough potentially and accumulation as consequence of diminishing renal function rather than cause.  Nonetheless.  We have attempted to avoid unless absolutely necessary.     --History MRSA     --Diabetes type 2.  Uncontrolled.  Medical noncompliance in general     --History DUNLAP/chronic liver disease and probable cirrhosis per past notes     --Left BKA with chronic pain     --Chronic right lower leg wound  appears clean without obvious secondary infection at present     --Peripheral vascular disease followed previously by Dr. Martinez     --Medical noncompliance; he voices understanding the negative impact and barrier to care caused by his noncompliance    PLAN:     --IV unasyn/flagyl and oral vancomycin, IV mycamine    -- urologic surgery 6/18     --Check/review labs cultures and scans     --flex sig 6/11     --Discussed with microbiology     --Partial history per nursing and family     --Highly complex set of issues with high risk for further serious morbidity and other serious sequela    **Urine culture similar to 2019 so far; ?recurrence -v- persistent urologic nidus    **d/w Dr Zarate ; his noncompliance is a barrier to outpatient care; ?placement options    **duration of abx to depend on  clinical course/tolerability, patient compliance etc.. I anticipated   14 days antifungal and coverage for lactobacillus ,  which is 7/3 (both isolated at surgery but not initially in urine and significance unclear but covered, with concern for klebsiella as dominant pathogen given past hospitalizations and culture at admit); potentially longer duration for treatment of klebsiella with oral agent as step down after 7/3 and  I anticipate  lengthy oral vancomycin taper over 6-8 weeks;    currently he is refusing placement    **I am out until 7/13;  [facility] to see Monday;  Call if needed sooner          Usman Dong MD  7/3/2020

## 2020-07-03 NOTE — PLAN OF CARE
Problem: Patient Care Overview  Goal: Plan of Care Review  Outcome: Ongoing (interventions implemented as appropriate)  Pt went back and forth on whether or not he would stay or leave AMA, ultimately decided he would at least stay the weekend for further antibiotic treatment. A&Ox4, NSR on monitor.

## 2020-07-03 NOTE — PROGRESS NOTES
[facility]  PROGRESS NOTE    Patient Name: Kendrick Crystal  : 1970  MRN: [id number]    Date of Admission: 2020  Primary Care Physician: Stanley Carrasco DO    Subjective   Subjective     CC:  Bacteremia, abscess    HPI:  Upset that he is not going home. He refuses to work with therapy so they have discharge him. He is unable to get up out of bed by himself. He is unwilling to have his caretaker at home come to the hospital to see if the caretaker will be able to provide the help he needs at home. He is unwilling to go to rehab. He feels that I am supposed to discharge him and then he can come back to the hospital if he can't take care of himself. His wife is in a motorized scooter, she cannot care for him at home.     Review of Systems  Gen- No fevers, chills  CV- No chest pain, palpitations  Resp- No cough, dyspnea  GI- No N/V/D, abd pain      Objective   Objective     Vital Signs:   Temp:  [97.4 °F (36.3 °C)-98.2 °F (36.8 °C)] 98.2 °F (36.8 °C)  Heart Rate:  [68-80] 80  Resp:  [18-20] 18  BP: (133-178)/() 151/98        Physical Exam:  Constitutional: No acute distress, awake, older than stated age  HENT: NCAT, mucous membranes moist  Respiratory: decreased bs secondary to body habitus, respiratory effort normal   Cardiovascular: RRR, no murmurs, rubs, or gallops, palpable pedal pulses bilaterally  Gastrointestinal: Positive bowel sounds, soft, nontender, nondistended  Musculoskeletal: L BKA  Psychiatric: Appropriate affect, cooperative   Neurologic: Oriented x 3, speech clear, combative at times  Skin: No rashes    Results Reviewed:  Results from last 7 days   Lab Units 20  0543 20  0339 20  0521   WBC 10*3/mm3 13.61* 13.01* 12.42*   HEMOGLOBIN g/dL 8.0* 8.2* 7.9*   HEMATOCRIT % 26.5* 27.2* 26.2*   PLATELETS 10*3/mm3 471* 450 365     Results from last 7 days   Lab Units 20  0543 20  0339 20  0521   SODIUM mmol/L  135* 139 138   POTASSIUM mmol/L 3.8 4.5 3.9   CHLORIDE mmol/L 101 103 102   CO2 mmol/L 25.0 26.0 29.0   BUN mg/dL 11 9 8   CREATININE mg/dL 0.53* 0.53* 0.51*   GLUCOSE mg/dL 163* 157* 171*   CALCIUM mg/dL 7.6* 7.2* 8.0*   ALT (SGPT) U/L 7  --  6   AST (SGOT) U/L 11  --  13   PROBNP pg/mL 4,035.0*  --   --      Estimated Creatinine Clearance: 218.1 mL/min (A) (by C-G formula based on SCr of 0.53 mg/dL (L)).    Microbiology Results Abnormal     Procedure Component Value - Date/Time    AFB Culture - Tissue, Prostate [[id number]] Collected:  06/18/20 1302    Lab Status:  Preliminary result Specimen:  Tissue from Prostate Updated:  07/02/20 1330     AFB Culture No AFB isolated at 2 weeks     AFB Stain No acid fast bacilli seen on concentrated smear    COVID PRE-OP / PRE-PROCEDURE SCREENING ORDER (NO ISOLATION) - Swab, Nasopharynx [[id number]] Collected:  06/26/20 0538    Lab Status:  Final result Specimen:  Swab from Nasopharynx Updated:  06/26/20 1136    Narrative:       The following orders were created for panel order COVID PRE-OP / PRE-PROCEDURE SCREENING ORDER (NO ISOLATION) - Swab, Nasopharynx.  Procedure                               Abnormality         Status                     ---------                               -----------         ------                     COVID-19,[facility] IN-HOUSE...[[id number]]  Normal              Final result                 Please view results for these tests on the individual orders.    COVID-19,[facility] IN-HOUSE, NP SWAB IN TRANSPORT MEDIA 8-12 HR TAT - Swab, Nasopharynx [[id number]]  (Normal) Collected:  06/26/20 0538    Lab Status:  Final result Specimen:  Swab from Nasopharynx Updated:  06/26/20 1136     COVID19 Not Detected    Narrative:       Fact sheet for providers: https://www.fda.gov/media/160057/download     Fact sheet for patients: https://www.fda.gov/media/099875/download    Fungus Culture - Tissue, Prostate [[id number]]  (Abnormal) Collected:  06/18/20 1302    Lab Status:   Preliminary result Specimen:  Tissue from Prostate Updated:  06/24/20 1936     Fungus Culture Moderate growth (3+) Yeast, Not Candida albicans    Anaerobic Culture - Tissue, Prostate [[id number]] Collected:  06/18/20 1302    Lab Status:  Final result Specimen:  Tissue from Prostate Updated:  06/23/20 0827     Anaerobic Culture No anaerobes isolated at 5 days    Tissue / Bone Culture - Tissue, Prostate [[id number]]  (Abnormal)  (Susceptibility) Collected:  06/18/20 1302    Lab Status:  Final result Specimen:  Tissue from Prostate Updated:  06/20/20 0940     Tissue Culture Rare Klebsiella pneumoniae ssp pneumoniae      Light growth (2+) Yeast, Not Candida albicans      Light growth (2+) Lactobacillus species     Comment: Lactobacillus species is a component of normal human sherly.  Clindamycin, Penicillin, and Ampicillin generally are the most active in vitro agents.  Resistance to Vancomycin and Aminoglycosides is reported.          Gram Stain Many (4+) WBCs seen      Few (2+) Gram positive bacilli      Few (2+) Gram positive cocci in pairs    Susceptibility      Klebsiella pneumoniae ssp pneumoniae     EYAD     Ampicillin Resistant     Ampicillin + Sulbactam Susceptible     Cefepime Susceptible     Ceftazidime Susceptible     Ceftriaxone Susceptible     Gentamicin Susceptible     Levofloxacin Susceptible     Piperacillin + Tazobactam Susceptible     Tetracycline Susceptible     Trimethoprim + Sulfamethoxazole Susceptible                Susceptibility Comments     Klebsiella pneumoniae ssp pneumoniae    Cefazolin sensitivity will not be reported for Enterobacteriaceae in non-urine isolates. If cefazolin is preferred, please call the microbiology lab to request an E-test.  With the exception of urinary-sourced infections, aminoglycosides should not be used as monotherapy.             Clostridium Difficile Toxin - Stool, Per Rectum [[id number]] Collected:  06/17/20 0930    Lab Status:  Final result Specimen:  Stool from Per  Rectum Updated:  06/17/20 1114    Narrative:       The following orders were created for panel order Clostridium Difficile Toxin - Stool, Per Rectum.  Procedure                               Abnormality         Status                     ---------                               -----------         ------                     Clostridium Difficile To...[[id number]]  Abnormal            Final result                 Please view results for these tests on the individual orders.    Clostridium Difficile Toxin, PCR - Stool, Per Rectum [[id number]]  (Abnormal) Collected:  06/17/20 0930    Lab Status:  Final result Specimen:  Stool from Per Rectum Updated:  06/17/20 1114     C. Difficile Toxins by PCR Detected    Narrative:       Performance characteristics of test not established for patients <2 years of age.    Blood Culture - Blood, Hand, Left [[id number]] Collected:  06/08/20 0249    Lab Status:  Final result Specimen:  Blood from Hand, Left Updated:  06/13/20 0516     Blood Culture No growth at 5 days    Blood Culture - Blood, Hand, Left [[id number]] Collected:  06/08/20 0239    Lab Status:  Final result Specimen:  Blood from Hand, Left Updated:  06/13/20 0516     Blood Culture No growth at 5 days    Urine Culture - Urine, Urine, Catheter [[id number]]  (Abnormal)  (Susceptibility) Collected:  06/08/20 0048    Lab Status:  Final result Specimen:  Urine, Catheter Updated:  06/10/20 0227     Urine Culture >100,000 CFU/mL Klebsiella pneumoniae ssp pneumoniae    Susceptibility      Klebsiella pneumoniae ssp pneumoniae     EYAD     Ampicillin Resistant     Ampicillin + Sulbactam Susceptible     Cefazolin Susceptible     Cefepime Susceptible     Ceftazidime Susceptible     Ceftriaxone Susceptible     Gentamicin Susceptible     Levofloxacin Susceptible     Nitrofurantoin Intermediate     Piperacillin + Tazobactam Susceptible     Tetracycline Susceptible     Trimethoprim + Sulfamethoxazole Susceptible                    COVID PRE-OP /  PRE-PROCEDURE SCREENING ORDER (NO ISOLATION) - Swab, Nasopharynx [[id number]] Collected:  06/08/20 0546    Lab Status:  Final result Specimen:  Swab from Nasopharynx Updated:  06/08/20 0654    Narrative:       The following orders were created for panel order COVID PRE-OP / PRE-PROCEDURE SCREENING ORDER (NO ISOLATION) - Swab, Nasopharynx.  Procedure                               Abnormality         Status                     ---------                               -----------         ------                     COVID-19,[facility] IN-...[[id number]]  Normal              Final result                 Please view results for these tests on the individual orders.    COVID-19,[facility] IN-HOUSE(OR EMERGENT/ADD-ON),NP SWAB IN TRANSPORT MEDIA 3-4 HR TAT - Swab, Nasopharynx [[id number]]  (Normal) Collected:  06/08/20 0546    Lab Status:  Final result Specimen:  Swab from Nasopharynx Updated:  06/08/20 0654     COVID19 Not Detected          Imaging Results (Last 24 Hours)     ** No results found for the last 24 hours. **          Results for orders placed during the hospital encounter of 11/18/19   Adult Transthoracic Echo Complete W/ Cont if Necessary Per Protocol    Narrative · Estimated EF = 45%.  · Left atrial cavity size is mildly dilated.  · There is calcification of the aortic valve.  · Left ventricular systolic function is mildly decreased.          I have reviewed the medications:  Scheduled Meds:  amLODIPine 10 mg Oral Q24H   ampicillin-sulbactam 3 g Intravenous Q6H   aspirin 81 mg Oral Daily   Check Fentanyl Patch Placement 1 each Does not apply Q12H   DULoxetine 60 mg Oral Daily   gabapentin 100 mg Oral Daily   gabapentin 300 mg Oral Nightly   heparin (porcine) 5,000 Units Subcutaneous Q8H   hydrALAZINE 10 mg Oral Q8H   insulin detemir 5 Units Subcutaneous Q12H   insulin lispro 0-9 Units Subcutaneous TID AC   insulin lispro 3 Units Subcutaneous TID With Meals   metoprolol tartrate 100 mg Oral Q12H   metroNIDAZOLE  500 mg Intravenous Q6H   micafungin (MYCAMINE)  mg Intravenous Q24H   miconazole 1 application Topical Q12H   miconazole  Topical Q12H   QUEtiapine 12.5 mg Oral Nightly   sodium chloride 10 mL Intravenous Q12H   terazosin 10 mg Oral Nightly   vancomycin 250 mg Oral Q6H     Continuous Infusions:   PRN Meds:.•  alteplase (CATHFLO) INTRACATHETER injection  •  calcium gluconate IVPB **AND** calcium gluconate IVPB **AND** Calcium, Ionized  •  dextrose  •  dextrose  •  docusate sodium  •  glucagon (human recombinant)  •  hydrALAZINE  •  Lidocaine HCl Urethral/Mucosal 2%  •  magnesium sulfate **OR** magnesium sulfate **OR** magnesium sulfate  •  melatonin  •  ondansetron  •  oxyCODONE  •  potassium chloride **OR** potassium chloride **OR** potassium chloride    Assessment/Plan   Assessment & Plan     Active Hospital Problems    Diagnosis  POA   • **Prostate abscess [N41.2]  Yes   • Clostridium difficile colitis [A04.72]  Unknown   • Fall [W19.XXXA]  Yes   • Abscess [L02.91]  Yes   • Left thigh pain [M79.652]  Yes   • Wound of right leg [S81.801A]  Yes   • Acute suppurative prostatitis  [N41.0]  Yes   • Diabetic ketoacidosis without coma associated with type 2 diabetes mellitus (CMS/HCC) [E11.10]  Yes   • Medical non-compliance [Z91.19]  Not Applicable   • Bipolar disorder (CMS/HCC) [F31.9]  Yes   • History of Clostridioides difficile colitis [Z86.19]  Not Applicable   • Acute UTI (urinary tract infection) [N39.0]  Yes   • Leukocytosis [D72.829]  Yes   • Sepsis (CMS/HCC) [A41.9]  Yes   • Peripheral vascular disease (CMS/HCC) [I73.9]  Yes   • S/P BKA (below knee amputation), left (CMS/HCC) [Z89.512]  Not Applicable   • Type 2 diabetes mellitus with circulatory disorder and uncontrolled [E11.59]  Yes   • Essential hypertension [I10]  Yes   • 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        Brief Hospital Course to date:  Kendrick MADRID  Bonilla is a 49 y.o. male with PMHx significant for uncontrolled DMII with neuropathy, HLD, HTN, DUNLAP cirrhosis, PVD s/p L BKA, BPH, perirectal abscess in October 2019, and perinephric abscess in October 2019 who presents with sepsis w/ ct imaging concerning for prostate abscess. Due to previous history of perirectal abscess, Dr. Payne performed flex sigmoidoscopy on 6/11/20 which showed well healing rectal incision. Urology also followed. MR pelvis on 6/17/20 confirmed extensive prostate/periprostatic abscess w/ small multilocular abscess which was not percutaneously drainable w/ left greater than right adductor muscle edema and mild gluteal edema (but no abscess). Due to development of severe diarrhea, c.diff pcr was sent on 6/17/20 which was positive. Dr. Paez of urology consulted and ultimately performed cystoscopy w/ transurethral resection of prostatic abscess on 6/18/20.     This patient's problems and plans were partially entered by my partner and updated as appropriate by me 07/03/20.     Sepsis (leukocytosis, tachycardia), resolved  Prostate Abscess/UTI  -- Continue Unasyn/Flagyl, oral vanco, IV Mycamine.  Per ID will require 2-4 more weeks of antibiotics.    --Patient has failed multiple courses of PO antibiotics, and home health infusion is not an option.  Declined bed at [facility]  --Continue F/C for 1-2 weeks per urology note 6/26     C diff colitis:  --Continue PO vanco and IV flagyl  --having about 1 stool per day     S/P DKA (now resolved)  Uncontrolled T2DM (A1C 16.6)   --Continue prandial insulin and SSI  --continue  low dose levemir  --Accu checks     Encephalopathy (resolved)  --multifactorial including sepsis and bipolar disorder     Hx DUNLAP, Hx LBKA     AoCD  --Stable     Depression and question of bipolar affective disorder  --Continue seroquel, cymbalta  --does not follow with psychiatry     **Discussed with wife and patient today that if they are able to have the state-sponsored aide come to  the hospital to receive training and nursing to witness the aide's ability to transfer patient, iv care, etc then I would feel comfortable sending him home over the weekend     DVT Prophylaxis:  heparin    Disposition: I expect the patient to be discharged TBD    CODE STATUS:   Code Status and Medical Interventions:   Ordered at: 06/15/20 1640     Limited Support to NOT Include:    Intubation    Cardioversion/Defibrillation    Dialysis    Vasopressors     Level Of Support Discussed With:    Patient    Next of Kin (If No Surrogate)     Code Status:    No CPR     Medical Interventions (Level of Support Prior to Arrest):    Limited         Electronically signed by Farideh Yousif DO, 07/03/20, 13:28.

## 2020-07-03 NOTE — PROGRESS NOTES
Continued Stay Note  [facility]     Patient Name: Kendrick Crystal  MRN: [id number]  Today's Date: 7/3/2020    Admit Date: 6/7/2020    Discharge Plan     Row Name 07/03/20 1546       Plan    Plan Comments  SW called in an APS report. Report number [id number].        Discharge Codes    No documentation.       Expected Discharge Date and Time     Expected Discharge Date Expected Discharge Time    Jul 3, 2020             JOHANNA Seaman

## 2020-07-03 NOTE — NURSING NOTE
"Patient states \"I hate being lied to, last week they said I could go home on Friday so I'm going home today, even if I have to sign myself out.\" Patient refused assessment and refused all meds except blood pressure meds (which he only took with encouragement from RN). Dr. Dong and Dr. Yousif notified.  "

## 2020-07-04 NOTE — PROGRESS NOTES
[facility] Medicine Services  PROGRESS NOTE    Patient Name: Kendrick Crystal  : 1970  MRN: [id number]    Date of Admission: 2020  Primary Care Physician: Stanley Carrasco DO    Subjective   Subjective     CC:  bacteremia    HPI:  Agreeable to wait until Monday for discharge. Will see how he does over the weekend but he has been more cooperative and willing to work with me today    Review of Systems  Gen- No fevers, chills  CV- No chest pain, palpitations  Resp- No cough, dyspnea  GI- No N/V/D, abd pain        Objective   Objective     Vital Signs:   Temp:  [97.6 °F (36.4 °C)-98.4 °F (36.9 °C)] 98.4 °F (36.9 °C)  Heart Rate:  [82-93] 83  Resp:  [16-18] 16  BP: (124-161)/(72-92) 124/72        Physical Exam:  Constitutional: No acute distress, awake, alert  HENT: NCAT, mucous membranes moist  Respiratory: Clear to auscultation bilaterally, respiratory effort normal   Cardiovascular: RRR, no murmur  Gastrointestinal: Positive bowel sounds, soft, nontender, nondistended  Musculoskeletal: pitting edema on leg  Psychiatric: Appropriate affect, cooperative  Neurologic: Oriented x 3, speech clear  Skin: No rashes    Results Reviewed:  Results from last 7 days   Lab Units 20  0543 20  03320  0521   WBC 10*3/mm3 13.61* 13.01* 12.42*   HEMOGLOBIN g/dL 8.0* 8.2* 7.9*   HEMATOCRIT % 26.5* 27.2* 26.2*   PLATELETS 10*3/mm3 471* 450 365     Results from last 7 days   Lab Units 20  0543 20  0339 20  0521   SODIUM mmol/L 135* 139 138   POTASSIUM mmol/L 3.8 4.5 3.9   CHLORIDE mmol/L 101 103 102   CO2 mmol/L 25.0 26.0 29.0   BUN mg/dL 11 9 8   CREATININE mg/dL 0.53* 0.53* 0.51*   GLUCOSE mg/dL 163* 157* 171*   CALCIUM mg/dL 7.6* 7.2* 8.0*   ALT (SGPT) U/L 7  --  6   AST (SGOT) U/L 11  --  13   PROBNP pg/mL 4,035.0*  --   --      Estimated Creatinine Clearance: 218.1 mL/min (A) (by C-G formula based on SCr of 0.53 mg/dL (L)).    Microbiology Results Abnormal      Procedure Component Value - Date/Time    AFB Culture - Tissue, Prostate [[id number]] Collected:  06/18/20 1302    Lab Status:  Preliminary result Specimen:  Tissue from Prostate Updated:  07/02/20 1330     AFB Culture No AFB isolated at 2 weeks     AFB Stain No acid fast bacilli seen on concentrated smear    COVID PRE-OP / PRE-PROCEDURE SCREENING ORDER (NO ISOLATION) - Swab, Nasopharynx [[id number]] Collected:  06/26/20 0538    Lab Status:  Final result Specimen:  Swab from Nasopharynx Updated:  06/26/20 1136    Narrative:       The following orders were created for panel order COVID PRE-OP / PRE-PROCEDURE SCREENING ORDER (NO ISOLATION) - Swab, Nasopharynx.  Procedure                               Abnormality         Status                     ---------                               -----------         ------                     COVID-19,[facility] IN-HOUSE...[[id number]]  Normal              Final result                 Please view results for these tests on the individual orders.    COVID-19,[facility] IN-HOUSE, NP SWAB IN TRANSPORT MEDIA 8-12 HR TAT - Swab, Nasopharynx [[id number]]  (Normal) Collected:  06/26/20 0538    Lab Status:  Final result Specimen:  Swab from Nasopharynx Updated:  06/26/20 1136     COVID19 Not Detected    Narrative:       Fact sheet for providers: https://www.fda.gov/media/441546/download     Fact sheet for patients: https://www.fda.gov/media/281339/download    Fungus Culture - Tissue, Prostate [[id number]]  (Abnormal) Collected:  06/18/20 1302    Lab Status:  Preliminary result Specimen:  Tissue from Prostate Updated:  06/24/20 1936     Fungus Culture Moderate growth (3+) Yeast, Not Candida albicans    Anaerobic Culture - Tissue, Prostate [[id number]] Collected:  06/18/20 1302    Lab Status:  Final result Specimen:  Tissue from Prostate Updated:  06/23/20 0827     Anaerobic Culture No anaerobes isolated at 5 days    Tissue / Bone Culture - Tissue, Prostate [[id number]]  (Abnormal)  (Susceptibility)  Collected:  06/18/20 1302    Lab Status:  Final result Specimen:  Tissue from Prostate Updated:  06/20/20 0940     Tissue Culture Rare Klebsiella pneumoniae ssp pneumoniae      Light growth (2+) Yeast, Not Candida albicans      Light growth (2+) Lactobacillus species     Comment: Lactobacillus species is a component of normal human sherly.  Clindamycin, Penicillin, and Ampicillin generally are the most active in vitro agents.  Resistance to Vancomycin and Aminoglycosides is reported.          Gram Stain Many (4+) WBCs seen      Few (2+) Gram positive bacilli      Few (2+) Gram positive cocci in pairs    Susceptibility      Klebsiella pneumoniae ssp pneumoniae     EYAD     Ampicillin Resistant     Ampicillin + Sulbactam Susceptible     Cefepime Susceptible     Ceftazidime Susceptible     Ceftriaxone Susceptible     Gentamicin Susceptible     Levofloxacin Susceptible     Piperacillin + Tazobactam Susceptible     Tetracycline Susceptible     Trimethoprim + Sulfamethoxazole Susceptible                Susceptibility Comments     Klebsiella pneumoniae ssp pneumoniae    Cefazolin sensitivity will not be reported for Enterobacteriaceae in non-urine isolates. If cefazolin is preferred, please call the microbiology lab to request an E-test.  With the exception of urinary-sourced infections, aminoglycosides should not be used as monotherapy.             Clostridium Difficile Toxin - Stool, Per Rectum [[id number]] Collected:  06/17/20 0930    Lab Status:  Final result Specimen:  Stool from Per Rectum Updated:  06/17/20 1114    Narrative:       The following orders were created for panel order Clostridium Difficile Toxin - Stool, Per Rectum.  Procedure                               Abnormality         Status                     ---------                               -----------         ------                     Clostridium Difficile To...[[id number]]  Abnormal            Final result                 Please view results for these  tests on the individual orders.    Clostridium Difficile Toxin, PCR - Stool, Per Rectum [[id number]]  (Abnormal) Collected:  06/17/20 0930    Lab Status:  Final result Specimen:  Stool from Per Rectum Updated:  06/17/20 1114     C. Difficile Toxins by PCR Detected    Narrative:       Performance characteristics of test not established for patients <2 years of age.    Blood Culture - Blood, Hand, Left [[id number]] Collected:  06/08/20 0249    Lab Status:  Final result Specimen:  Blood from Hand, Left Updated:  06/13/20 0516     Blood Culture No growth at 5 days    Blood Culture - Blood, Hand, Left [[id number]] Collected:  06/08/20 0239    Lab Status:  Final result Specimen:  Blood from Hand, Left Updated:  06/13/20 0516     Blood Culture No growth at 5 days    Urine Culture - Urine, Urine, Catheter [[id number]]  (Abnormal)  (Susceptibility) Collected:  06/08/20 0048    Lab Status:  Final result Specimen:  Urine, Catheter Updated:  06/10/20 0227     Urine Culture >100,000 CFU/mL Klebsiella pneumoniae ssp pneumoniae    Susceptibility      Klebsiella pneumoniae ssp pneumoniae     EYAD     Ampicillin Resistant     Ampicillin + Sulbactam Susceptible     Cefazolin Susceptible     Cefepime Susceptible     Ceftazidime Susceptible     Ceftriaxone Susceptible     Gentamicin Susceptible     Levofloxacin Susceptible     Nitrofurantoin Intermediate     Piperacillin + Tazobactam Susceptible     Tetracycline Susceptible     Trimethoprim + Sulfamethoxazole Susceptible                    COVID PRE-OP / PRE-PROCEDURE SCREENING ORDER (NO ISOLATION) - Swab, Nasopharynx [[id number]] Collected:  06/08/20 0546    Lab Status:  Final result Specimen:  Swab from Nasopharynx Updated:  06/08/20 0654    Narrative:       The following orders were created for panel order COVID PRE-OP / PRE-PROCEDURE SCREENING ORDER (NO ISOLATION) - Swab, Nasopharynx.  Procedure                               Abnormality         Status                     ---------                                -----------         ------                     COVID-19,[facility] IN-...[[id number]]  Normal              Final result                 Please view results for these tests on the individual orders.    COVID-19,[facility] IN-HOUSE(OR EMERGENT/ADD-ON),NP SWAB IN TRANSPORT MEDIA 3-4 HR TAT - Swab, Nasopharynx [[id number]]  (Normal) Collected:  06/08/20 0546    Lab Status:  Final result Specimen:  Swab from Nasopharynx Updated:  06/08/20 0654     COVID19 Not Detected          Imaging Results (Last 24 Hours)     ** No results found for the last 24 hours. **          Results for orders placed during the hospital encounter of 11/18/19   Adult Transthoracic Echo Complete W/ Cont if Necessary Per Protocol    Narrative · Estimated EF = 45%.  · Left atrial cavity size is mildly dilated.  · There is calcification of the aortic valve.  · Left ventricular systolic function is mildly decreased.          I have reviewed the medications:  Scheduled Meds:  amLODIPine 10 mg Oral Q24H   ampicillin-sulbactam 3 g Intravenous Q6H   aspirin 81 mg Oral Daily   Check Fentanyl Patch Placement 1 each Does not apply Q12H   DULoxetine 60 mg Oral Daily   gabapentin 300 mg Oral Nightly   heparin (porcine) 5,000 Units Subcutaneous Q8H   hydrALAZINE 10 mg Oral Q8H   insulin detemir 5 Units Subcutaneous Q12H   insulin lispro 0-9 Units Subcutaneous TID AC   insulin lispro 3 Units Subcutaneous TID With Meals   metoprolol tartrate 100 mg Oral Q12H   metroNIDAZOLE 500 mg Intravenous Q6H   micafungin (MYCAMINE)  mg Intravenous Q24H   miconazole 1 application Topical Q12H   miconazole  Topical Q12H   QUEtiapine 12.5 mg Oral Nightly   sodium chloride 10 mL Intravenous Q12H   terazosin 10 mg Oral Nightly   vancomycin 250 mg Oral Q6H     Continuous Infusions:   PRN Meds:.•  alteplase (CATHFLO) INTRACATHETER injection  •  calcium gluconate IVPB **AND** calcium gluconate IVPB **AND** Calcium, Ionized  •  dextrose  •  dextrose  •   docusate sodium  •  glucagon (human recombinant)  •  hydrALAZINE  •  Lidocaine HCl Urethral/Mucosal 2%  •  magnesium sulfate **OR** magnesium sulfate **OR** magnesium sulfate  •  melatonin  •  ondansetron  •  oxyCODONE  •  potassium chloride **OR** potassium chloride **OR** potassium chloride    Assessment/Plan   Assessment & Plan     Active Hospital Problems    Diagnosis  POA   • **Prostate abscess [N41.2]  Yes   • Clostridium difficile colitis [A04.72]  Unknown   • Fall [W19.XXXA]  Yes   • Abscess [L02.91]  Yes   • Left thigh pain [M79.652]  Yes   • Wound of right leg [S81.801A]  Yes   • Acute suppurative prostatitis  [N41.0]  Yes   • Diabetic ketoacidosis without coma associated with type 2 diabetes mellitus (CMS/HCC) [E11.10]  Yes   • Medical non-compliance [Z91.19]  Not Applicable   • Bipolar disorder (CMS/HCC) [F31.9]  Yes   • History of Clostridioides difficile colitis [Z86.19]  Not Applicable   • Acute UTI (urinary tract infection) [N39.0]  Yes   • Leukocytosis [D72.829]  Yes   • Sepsis (CMS/HCC) [A41.9]  Yes   • Peripheral vascular disease (CMS/HCC) [I73.9]  Yes   • S/P BKA (below knee amputation), left (CMS/HCC) [Z89.512]  Not Applicable   • Type 2 diabetes mellitus with circulatory disorder and uncontrolled [E11.59]  Yes   • Essential hypertension [I10]  Yes   • 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        Brief Hospital Course to date:  Kendrick Crystal is a 49 y.o. male with PMHx significant for uncontrolled DMII with neuropathy, HLD, HTN, DUNLAP cirrhosis, PVD s/p L BKA, BPH, perirectal abscess in October 2019, and perinephric abscess in October 2019 who presents with sepsis w/ ct imaging concerning for prostate abscess. Due to previous history of perirectal abscess, Dr. Payne performed flex sigmoidoscopy on 6/11/20 which showed well healing rectal incision. Urology also followed. MR pelvis on 6/17/20 confirmed  extensive prostate/periprostatic abscess w/ small multilocular abscess which was not percutaneously drainable w/ left greater than right adductor muscle edema and mild gluteal edema (but no abscess). Due to development of severe diarrhea, c.diff pcr was sent on 6/17/20 which was positive. Dr. Paez of urology consulted and ultimately performed cystoscopy w/ transurethral resection of prostatic abscess on 6/18/20.     This patient's problems and plans were partially entered by my partner and updated as appropriate by me 07/04/20.     Sepsis (leukocytosis, tachycardia), resolved  Prostate Abscess/UTI  -- Continue Unasyn/Flagyl, oral vanco, IV Mycamine.  Per ID will require 2-4 more weeks of antibiotics.    --Patient has failed multiple courses of PO antibiotics, and home health infusion is not an option.  Declined bed at [facility]  --Continue F/C for 1-2 weeks per urology note 6/26  --patient not able to void with childs removal - replaced     C diff colitis:  --Continue PO vanco and IV flagyl  --having about 1 stool per day     S/P DKA (now resolved)  Uncontrolled T2DM (A1C 16.6)   --Continue prandial insulin and SSI  --continue  low dose levemir  --Accu checks     Encephalopathy (resolved)  --multifactorial including sepsis and bipolar disorder     Hx DUNLAP, Hx LBKA     AoCD  --Stable     Depression and question of bipolar affective disorder  --Continue seroquel, cymbalta  --does not follow with psychiatry    Edema  --got dose of IV lasix overnight but not accurate charting of UO  --will give another dose of lasix 40 mg IV to see if helps with swelling  --also due to neurontin and cymbalta as this is common side effect of these meds    DVT Prophylaxis:  Mechanical    Disposition: I expect the patient to be discharged maybe on monday    CODE STATUS:   Code Status and Medical Interventions:   Ordered at: 06/15/20 1640     Limited Support to NOT Include:    Intubation    Cardioversion/Defibrillation    Dialysis     Vasopressors     Level Of Support Discussed With:    Patient    Next of Kin (If No Surrogate)     Code Status:    No CPR     Medical Interventions (Level of Support Prior to Arrest):    Limited         Electronically signed by Farideh Yousif DO, 07/04/20, 14:39.

## 2020-07-05 NOTE — THERAPY DISCHARGE NOTE
Acute Care - Occupational Therapy Treatment Note/Discharge  [facility]     Patient Name: Kendrick Crystal  : 1970  MRN: [id number]  Today's Date: 2020               Admit Date: 2020    Visit Dx:     ICD-10-CM ICD-9-CM   1. Acute UTI N39.0 599.0   2. Urinary retention R33.9 788.20   3. Leukocytosis, unspecified type D72.829 288.60   4. Hyperglycemia R73.9 790.29   5. Epistaxis R04.0 784.7   6. Other chronic pain G89.29 338.29   7. SIRS (systemic inflammatory response syndrome) (CMS/HCC) R65.10 995.90   8. Prostate abscess N41.2 601.2   9. Impaired mobility and ADLs Z74.09 V49.89    Z78.9    10. Liver cirrhosis secondary to DUNLAP ([facility] [place]) K75.81 571.8    K74.60 571.5   11. Essential hypertension I10 401.9   12. Type 2 diabetes mellitus with diabetic peripheral angiopathy and gangrene, with long-term current use of insulin (CMS/HCC) E11.52 250.70    Z79.4 443.81     785.4     V58.67   13. S/P BKA (below knee amputation), left (CMS/HCC) Z89.512 V49.75   14. Peripheral vascular disease (CMS/HCC) I73.9 443.9   15. Sepsis, due to unspecified organism, unspecified whether acute organ dysfunction present (CMS/HCC) A41.9 038.9     995.91   16. Acute UTI (urinary tract infection) N39.0 599.0   17. History of Clostridioides difficile colitis Z86.19 V12.79   18. Bipolar affective disorder, remission status unspecified (CMS/HCC) F31.9 296.80   19. Medical non-compliance Z91.19 V15.81   20. Abscess L02.91 682.9   21. Left thigh pain M79.652 729.5   22. Wound of right lower extremity, subsequent encounter S81.801D V58.89     894.0   23. Acute suppurative prostatitis  N41.0 601.0   24. Diabetic ketoacidosis without coma associated with type 2 diabetes mellitus (CMS/HCC) E11.10 250.12   25. Clostridium difficile colitis A04.72 008.45   26. Non-compliance Z91.14 V15.81   27. Mixed hyperlipidemia E78.2 272.2   28. Hypertriglyceridemia, essential E78.1 272.1   29. Hypomagnesemia E83.42 275.2   30. History of MRSA  infection Z86.14 V12.04   31. Diabetic ulcer of right lower leg (CMS/[facility]) E11.622 250.80    L97.919 707.10   32. Obesity (BMI 30-39.9) E66.9 278.00   33. Elevated troponin R79.89 790.6   34. Pneumaturia R39.89 599.84   35. Gastroparesis due to secondary diabetes (CMS/[facility]) E13.43 249.60     536.3   36. Acute respiratory failure with hypoxia (CMS/[facility]) J96.01 518.81   37. Diastolic CHF, chronic (CMS/[facility]) I50.32 428.32     428.0   38. Pleural effusion due to CHF (congestive heart failure) (CMS/[facility]) I50.9 428.0   39. Self neglect R46.89 V40.39     Patient Active Problem List   Diagnosis   • Liver cirrhosis secondary to DUNLAP (CMS/[facility])   • Essential hypertension   • Non-compliance   • Hyperlipemia   • Hypertriglyceridemia, essential   • Hypomagnesemia   • Type 2 diabetes mellitus with circulatory disorder and uncontrolled   • History of MRSA infection   • Diabetic ulcer of right lower leg (CMS/[facility])   • S/P BKA (below knee amputation), left (CMS/[facility])   • Peripheral vascular disease (CMS/[facility])   • Obesity (BMI 30-39.9)   • Elevated troponin   • Sepsis (CMS/[facility])   • Leukocytosis   • Acute UTI (urinary tract infection)   • Pneumaturia   • Gastroparesis due to secondary diabetes (CMS/[facility])   • History of Clostridioides difficile colitis   • Bipolar disorder (CMS/[facility])   • Acute respiratory failure with hypoxia (CMS/[facility])   • Diastolic CHF, chronic (CMS/[facility])   • Pleural effusion due to CHF (congestive heart failure) (CMS/[facility])   • Medical non-compliance   • Self neglect   • Fall   • Abscess   • Left thigh pain   • Wound of right leg   • Acute suppurative prostatitis    • Prostate abscess   • Diabetic ketoacidosis without coma associated with type 2 diabetes mellitus (CMS/[facility])   • Clostridium difficile colitis       Therapy Treatment    Rehabilitation Treatment Summary     Row Name 07/05/20 9282             Treatment Time/Intention    Comment  pt reports he does not want OT, has requested more than once to discharge from OT. D/C OT  today  -AN      Recorded by [AN] Fela Anguiano, OT 07/05/20 1540      Row Name                Residual Limb Assessment 05/30/19 2000 transtibial (below knee), left    Residual Limb Assessment - Properties Group Date first assessed: 05/30/19 [BN] Time first assessed: 2000 [BN] Location: transtibial (below knee), left [PT] Recorded by:  [BN] Mariah Flores RN 05/31/19 0138 [PT] Amy Villanueva LPN 05/20/19 0102    Row Name                Wound 11/19/19 0017 Right anterior;lower other (see notes) Abrasion    Wound - Properties Group Date first assessed: 11/19/19 [SF] Time first assessed: 0017 [SF] Present on Hospital Admission: Y [SF] Side: Right [SF] Orientation: anterior;lower [SF] Location: other (see notes) [SF], shin   Primary Wound Type: Abrasion [SF] Recorded by:  [SF] Carol Perea RN 11/19/19 0514    Row Name                Wound 06/22/20 1010 Right anterior;lower leg Venous Ulcer    Wound - Properties Group Date first assessed: 06/22/20 [MR] Time first assessed: 1010 [MR] Present on Hospital Admission: N [MR] Side: Right [MR] Orientation: anterior;lower [MR] Location: leg [MR] Primary Wound Type: Venous ulcer [MR] Recorded by:  [MR] SmithVeronica paez RN 06/22/20 1149      User Key  (r) = Recorded By, (t) = Taken By, (c) = Cosigned By    Initials Name Effective Dates Discipline    AN Fela Anguiano, OT 06/22/15 -  OT    MR Smith, Jennyfer, RN 06/16/16 -  Nurse    PT Amy Villanueva LPN 03/14/16 -  Nurse    Carol Shane RN 09/26/17 -  Nurse    Mariah Ervin RN 02/04/19 -  Nurse        Rash 06/30/20 1045 groin macular (Active)   Distribution localized 7/4/2020  8:00 PM   Borders irregular 7/4/2020  8:00 PM   Characteristics moist 7/4/2020  8:00 PM   Color red 7/4/2020  8:00 PM       Rash 06/30/20 1045 upper thigh papule (Active)   Distribution localized 7/4/2020  8:00 PM   Configuration/Shape asymmetric 7/4/2020  8:00 PM   Borders irregular 7/4/2020  8:00 PM   Characteristics  dry;raised 7/4/2020  8:00 PM   Color red;yellow 7/4/2020  8:00 PM       Wound 11/19/19 0017 Right anterior;lower other (see notes) Abrasion (Active)   Dressing Appearance dry;intact 7/4/2020  8:00 PM   Closure KALYN 7/4/2020  8:00 PM   Base epithelialization;red;pink 7/4/2020  8:00 PM   Periwound intact;dry;blanchable;pink 7/4/2020  8:00 PM   Periwound Temperature warm 7/4/2020  8:00 PM   Periwound Skin Turgor soft 7/4/2020  8:00 PM   Edges irregular;open;deb 7/4/2020  8:00 PM   Drainage Characteristics/Odor serous 7/4/2020  8:00 PM   Drainage Amount none 7/4/2020  8:00 PM       Wound 06/22/20 1010 Right anterior;lower leg Venous Ulcer (Active)   Dressing Appearance dry;intact 7/4/2020  8:00 PM   Closure Adhesive bandage 7/4/2020  8:00 PM   Base granulating;red 7/4/2020  8:00 PM   Periwound moist;pink 7/4/2020  8:00 PM   Periwound Temperature warm 7/4/2020  8:00 PM   Periwound Skin Turgor soft 7/4/2020  8:00 PM   Edges irregular 7/4/2020  8:00 PM   Drainage Characteristics/Odor serous 7/4/2020  8:00 PM   Drainage Amount none 7/4/2020  8:00 PM       Rehab Goal Summary     Row Name 07/05/20 1105             Occupational Therapy Goals    Transfer Goal Selection (OT)  transfer, OT goal 1  -AN      Dressing Goal Selection (OT)  dressing, OT goal 1  -AN      Self-Feeding Goal Selection (OT)  self feeding, OT goal 1  -AN      Balance Goal Selection (OT)  balance, OT goal 1  -AN         Transfer Goal 1 (OT)    Activity/Assistive Device (Transfer Goal 1, OT)  sit-to-stand/stand-to-sit;commode, bedside with drop arms  -AN      [place] Level/Cues Needed (Transfer Goal 1, OT)  moderate assist (50-74% patient effort);2 person assist;verbal cues required  -AN      Time Frame (Transfer Goal 1, OT)  short term goal (STG);1 week  -AN      Progress/Outcome (Transfer Goal 1, OT)  unable to make needed progress  -AN         Dressing Goal 1 (OT)    Activity/Assistive Device (Dressing Goal 1, OT)  upper body dressing  -AN       [place]/Cues Needed (Dressing Goal 1, OT)  minimum assist (75% or more patient effort);verbal cues required  -AN      Time Frame (Dressing Goal 1, OT)  short term goal (STG);1 week  -AN      Progress/Outcome (Dressing Goal 1, OT)  unable to make needed progress  -AN         Self-Feeding Goal 1 (OT)    Activity/Assistive Device (Self-Feeding Goal 1, OT)  self-feeding skills, all;built-up handle utensils;adapted cup  -AN      [place] Level/Cues Needed (Self-Feeding Goal 1, OT)  supervision required;verbal cues required  -AN      Time Frame (Self-Feeding Goal 1, OT)  short term goal (STG);1 week  -AN      Progress/Outcomes (Self-Feeding Goal 1, OT)  unable to make needed progress  -AN         Balance Goal 1 (OT)    Activity/Assistive Device (Balance Goal 1, OT)  sitting, static  -AN      [place] Level/Cues Needed (Balance Goal 1, OT)  verbal cues required;contact guard assist  -AN      Time Frame (Balance Goal 1, OT)  short term goal (STG);1 week  -AN      Progress/Outcomes (Balance Goal 1, OT)  unable to make needed progress  -AN        User Key  (r) = Recorded By, (t) = Taken By, (c) = Cosigned By    Initials Name Provider Type Discipline    AN Fela Anguiano, OT Occupational Therapist OT          Occupational Therapy Education                 Title: PT OT SLP Therapies (In Progress)     Topic: Occupational Therapy (In Progress)     Point: ADL training (In Progress)     Description:   Instruct learner(s) on proper safety adaptation and remediation techniques during self care or transfers.   Instruct in proper use of assistive devices.              Learning Progress Summary           Patient Acceptance, E,D, NR by FAN at 7/1/2020 0755    JOYCE De Los Santos VU by CF at 7/1/2020 0103    JOYCE De Los Santos VU by AR at 6/22/2020 0910                   Point: Home exercise program (In Progress)     Description:   Instruct learner(s) on appropriate technique for monitoring, assisting and/or progressing therapeutic  exercises/activities.              Learning Progress Summary           Patient Acceptance, E,D, NR by JY at 7/1/2020 0755    Eager, E, VU by CF at 7/1/2020 0103    Eager, E, VU by AR at 6/22/2020 0910                   Point: Precautions (In Progress)     Description:   Instruct learner(s) on prescribed precautions during self-care and functional transfers.              Learning Progress Summary           Patient Acceptance, E,D, NR by JY at 7/1/2020 0755    Eager, E, VU by CF at 7/1/2020 0103    Eager, E, VU by AR at 6/22/2020 0910                   Point: Body mechanics (In Progress)     Description:   Instruct learner(s) on proper positioning and spine alignment during self-care, functional mobility activities and/or exercises.              Learning Progress Summary           Patient Acceptance, E,D, NR by FAN at 7/1/2020 0755    Eager, E, VU by CF at 7/1/2020 0103    Eager, E, VU by AR at 6/22/2020 0910                               User Key     Initials Effective Dates Name Provider Type Discipline    AR 06/22/15 -  Fiona Main, OT Occupational Therapist OT    CF 07/03/19 -  Nimo Gomez, RN Registered Nurse Nurse    J 01/29/20 -  Sarahi Ann OT Occupational Therapist OT                OT Recommendation and Plan               Time Calculation:      Therapy Suggested Charges     Code   Minutes Charges    89520 (CPT®) Hc Ot Neuromusc Re Education Ea 15 Min      13334 (CPT®) Hc Ot Ther Proc Ea 15 Min 8 1    95248 (CPT®) Hc Ot Therapeutic Act Ea 15 Min 15 1    51093 (CPT®) Hc Ot Manual Therapy Ea 15 Min      40421 (CPT®) Hc Ot Iontophoresis Ea 15 Min      93939 (CPT®) Hc Ot Elec Stim Ea-Per 15 Min      15373 (CPT®) Hc Ot Ultrasound Ea 15 Min      66299 (CPT®) Hc Ot Self Care/Mgmt/Train Ea 15 Min 5     Total  28 2                      Fela Anguiano OT  7/5/2020

## 2020-07-05 NOTE — PROGRESS NOTES
[facility] Medicine Services  PROGRESS NOTE    Patient Name: Kendrick Crystal  : 1970  MRN: [id number]    Date of Admission: 2020  Primary Care Physician: Stanley Carrasco DO    Subjective   Subjective     CC:  bacteremia    HPI:  Doing ok, family at bedside who wants to know why he cant go to [facility] - I explained he has refused to work with PT and that is a requirement to go to rehab.    Review of Systems  Gen- No fevers, chills  CV- No chest pain, palpitations  Resp- No cough, dyspnea  GI- No N/V/D, abd pain, + edema      Objective   Objective     Vital Signs:   Temp:  [98 °F (36.7 °C)-98.4 °F (36.9 °C)] 98.1 °F (36.7 °C)  Heart Rate:  [67-85] 82  Resp:  [16] 16  BP: (122-157)/() 138/71        Physical Exam:  Constitutional: No acute distress, awake, alert  HENT: NCAT, mucous membranes moist  Respiratory: Clear to auscultation bilaterally, respiratory effort normal   Cardiovascular: RRR, no murmur  Gastrointestinal: Positive bowel sounds, soft, obese  Musculoskeletal: R bka, edema, childs  Psychiatric: Appropriate affect, cooperative  Neurologic: Oriented x 3, speech clear  Skin: No rashes    Results Reviewed:  Results from last 7 days   Lab Units 2043 20   WBC 10*3/mm3 12.08* 13.61* 13.01*   HEMOGLOBIN g/dL 8.0* 8.0* 8.2*   HEMATOCRIT % 27.6* 26.5* 27.2*   PLATELETS 10*3/mm3 497* 471* 450     Results from last 7 days   Lab Units 20  0720  0543 20  0521   SODIUM mmol/L 139 135* 139 138   POTASSIUM mmol/L 4.0 3.8 4.5 3.9   CHLORIDE mmol/L 103 101 103 102   CO2 mmol/L 27.0 25.0 26.0 29.0   BUN mg/dL 13 11 9 8   CREATININE mg/dL 0.54* 0.53* 0.53* 0.51*   GLUCOSE mg/dL 120* 163* 157* 171*   CALCIUM mg/dL 7.7* 7.6* 7.2* 8.0*   ALT (SGPT) U/L  --  7  --  6   AST (SGOT) U/L  --  11  --  13   PROBNP pg/mL  --  4,035.0*  --   --      Estimated Creatinine Clearance: 214 mL/min (A) (by C-G formula  based on SCr of 0.54 mg/dL (L)).    Microbiology Results Abnormal     Procedure Component Value - Date/Time    AFB Culture - Tissue, Prostate [[id number]] Collected:  06/18/20 1302    Lab Status:  Preliminary result Specimen:  Tissue from Prostate Updated:  07/02/20 1330     AFB Culture No AFB isolated at 2 weeks     AFB Stain No acid fast bacilli seen on concentrated smear    COVID PRE-OP / PRE-PROCEDURE SCREENING ORDER (NO ISOLATION) - Swab, Nasopharynx [[id number]] Collected:  06/26/20 0538    Lab Status:  Final result Specimen:  Swab from Nasopharynx Updated:  06/26/20 1136    Narrative:       The following orders were created for panel order COVID PRE-OP / PRE-PROCEDURE SCREENING ORDER (NO ISOLATION) - Swab, Nasopharynx.  Procedure                               Abnormality         Status                     ---------                               -----------         ------                     COVID-19,[facility] IN-HOUSE...[[id number]]  Normal              Final result                 Please view results for these tests on the individual orders.    COVID-19,[facility] IN-HOUSE, NP SWAB IN TRANSPORT MEDIA 8-12 HR TAT - Swab, Nasopharynx [[id number]]  (Normal) Collected:  06/26/20 0538    Lab Status:  Final result Specimen:  Swab from Nasopharynx Updated:  06/26/20 1136     COVID19 Not Detected    Narrative:       Fact sheet for providers: https://www.fda.gov/media/439668/download     Fact sheet for patients: https://www.fda.gov/media/864585/download    Fungus Culture - Tissue, Prostate [[id number]]  (Abnormal) Collected:  06/18/20 1302    Lab Status:  Preliminary result Specimen:  Tissue from Prostate Updated:  06/24/20 1936     Fungus Culture Moderate growth (3+) Yeast, Not Candida albicans    Anaerobic Culture - Tissue, Prostate [[id number]] Collected:  06/18/20 1302    Lab Status:  Final result Specimen:  Tissue from Prostate Updated:  06/23/20 0827     Anaerobic Culture No anaerobes isolated at 5 days    Tissue / Bone  Culture - Tissue, Prostate [[id number]]  (Abnormal)  (Susceptibility) Collected:  06/18/20 1302    Lab Status:  Final result Specimen:  Tissue from Prostate Updated:  06/20/20 0940     Tissue Culture Rare Klebsiella pneumoniae ssp pneumoniae      Light growth (2+) Yeast, Not Candida albicans      Light growth (2+) Lactobacillus species     Comment: Lactobacillus species is a component of normal human sherly.  Clindamycin, Penicillin, and Ampicillin generally are the most active in vitro agents.  Resistance to Vancomycin and Aminoglycosides is reported.          Gram Stain Many (4+) WBCs seen      Few (2+) Gram positive bacilli      Few (2+) Gram positive cocci in pairs    Susceptibility      Klebsiella pneumoniae ssp pneumoniae     EYAD     Ampicillin Resistant     Ampicillin + Sulbactam Susceptible     Cefepime Susceptible     Ceftazidime Susceptible     Ceftriaxone Susceptible     Gentamicin Susceptible     Levofloxacin Susceptible     Piperacillin + Tazobactam Susceptible     Tetracycline Susceptible     Trimethoprim + Sulfamethoxazole Susceptible                Susceptibility Comments     Klebsiella pneumoniae ssp pneumoniae    Cefazolin sensitivity will not be reported for Enterobacteriaceae in non-urine isolates. If cefazolin is preferred, please call the microbiology lab to request an E-test.  With the exception of urinary-sourced infections, aminoglycosides should not be used as monotherapy.             Clostridium Difficile Toxin - Stool, Per Rectum [[id number]] Collected:  06/17/20 0930    Lab Status:  Final result Specimen:  Stool from Per Rectum Updated:  06/17/20 1114    Narrative:       The following orders were created for panel order Clostridium Difficile Toxin - Stool, Per Rectum.  Procedure                               Abnormality         Status                     ---------                               -----------         ------                     Clostridium Difficile To...[[id number]]  Abnormal             Final result                 Please view results for these tests on the individual orders.    Clostridium Difficile Toxin, PCR - Stool, Per Rectum [[id number]]  (Abnormal) Collected:  06/17/20 0930    Lab Status:  Final result Specimen:  Stool from Per Rectum Updated:  06/17/20 1114     C. Difficile Toxins by PCR Detected    Narrative:       Performance characteristics of test not established for patients <2 years of age.    Blood Culture - Blood, Hand, Left [[id number]] Collected:  06/08/20 0249    Lab Status:  Final result Specimen:  Blood from Hand, Left Updated:  06/13/20 0516     Blood Culture No growth at 5 days    Blood Culture - Blood, Hand, Left [[id number]] Collected:  06/08/20 0239    Lab Status:  Final result Specimen:  Blood from Hand, Left Updated:  06/13/20 0516     Blood Culture No growth at 5 days    Urine Culture - Urine, Urine, Catheter [[id number]]  (Abnormal)  (Susceptibility) Collected:  06/08/20 0048    Lab Status:  Final result Specimen:  Urine, Catheter Updated:  06/10/20 0227     Urine Culture >100,000 CFU/mL Klebsiella pneumoniae ssp pneumoniae    Susceptibility      Klebsiella pneumoniae ssp pneumoniae     EYAD     Ampicillin Resistant     Ampicillin + Sulbactam Susceptible     Cefazolin Susceptible     Cefepime Susceptible     Ceftazidime Susceptible     Ceftriaxone Susceptible     Gentamicin Susceptible     Levofloxacin Susceptible     Nitrofurantoin Intermediate     Piperacillin + Tazobactam Susceptible     Tetracycline Susceptible     Trimethoprim + Sulfamethoxazole Susceptible                    COVID PRE-OP / PRE-PROCEDURE SCREENING ORDER (NO ISOLATION) - Swab, Nasopharynx [[id number]] Collected:  06/08/20 0546    Lab Status:  Final result Specimen:  Swab from Nasopharynx Updated:  06/08/20 0654    Narrative:       The following orders were created for panel order COVID PRE-OP / PRE-PROCEDURE SCREENING ORDER (NO ISOLATION) - Swab, Nasopharynx.  Procedure                                Abnormality         Status                     ---------                               -----------         ------                     COVID-19,[facility] IN-...[[id number]]  Normal              Final result                 Please view results for these tests on the individual orders.    COVID-19,[facility] IN-HOUSE(OR EMERGENT/ADD-ON),NP SWAB IN TRANSPORT MEDIA 3-4 HR TAT - Swab, Nasopharynx [[id number]]  (Normal) Collected:  06/08/20 0546    Lab Status:  Final result Specimen:  Swab from Nasopharynx Updated:  06/08/20 0654     COVID19 Not Detected          Imaging Results (Last 24 Hours)     ** No results found for the last 24 hours. **          Results for orders placed during the hospital encounter of 11/18/19   Adult Transthoracic Echo Complete W/ Cont if Necessary Per Protocol    Narrative · Estimated EF = 45%.  · Left atrial cavity size is mildly dilated.  · There is calcification of the aortic valve.  · Left ventricular systolic function is mildly decreased.          I have reviewed the medications:  Scheduled Meds:  amLODIPine 10 mg Oral Q24H   ampicillin-sulbactam 3 g Intravenous Q6H   aspirin 81 mg Oral Daily   Check Fentanyl Patch Placement 1 each Does not apply Q12H   DULoxetine 60 mg Oral Daily   furosemide 40 mg Intravenous Q12H   heparin (porcine) 5,000 Units Subcutaneous Q8H   hydrALAZINE 10 mg Oral Q8H   insulin detemir 5 Units Subcutaneous Q12H   insulin lispro 0-9 Units Subcutaneous TID AC   insulin lispro 3 Units Subcutaneous TID With Meals   metoprolol tartrate 100 mg Oral Q12H   metroNIDAZOLE 500 mg Intravenous Q6H   micafungin (MYCAMINE)  mg Intravenous Q24H   miconazole 1 application Topical Q12H   miconazole  Topical Q12H   QUEtiapine 12.5 mg Oral Nightly   sodium chloride 10 mL Intravenous Q12H   terazosin 10 mg Oral Nightly   vancomycin 250 mg Oral Q6H     Continuous Infusions:   PRN Meds:.•  alteplase (CATHFLO) INTRACATHETER injection  •  calcium gluconate IVPB **AND** calcium  gluconate IVPB **AND** Calcium, Ionized  •  dextrose  •  dextrose  •  docusate sodium  •  gabapentin  •  glucagon (human recombinant)  •  hydrALAZINE  •  Lidocaine HCl Urethral/Mucosal 2%  •  magnesium sulfate **OR** magnesium sulfate **OR** magnesium sulfate  •  melatonin  •  ondansetron  •  oxyCODONE  •  potassium chloride **OR** potassium chloride **OR** potassium chloride    Assessment/Plan   Assessment & Plan     Active Hospital Problems    Diagnosis  POA   • **Prostate abscess [N41.2]  Yes   • Clostridium difficile colitis [A04.72]  Unknown   • Fall [W19.XXXA]  Yes   • Abscess [L02.91]  Yes   • Left thigh pain [M79.652]  Yes   • Wound of right leg [S81.801A]  Yes   • Acute suppurative prostatitis  [N41.0]  Yes   • Diabetic ketoacidosis without coma associated with type 2 diabetes mellitus (CMS/HCC) [E11.10]  Yes   • Medical non-compliance [Z91.19]  Not Applicable   • Bipolar disorder (CMS/HCC) [F31.9]  Yes   • History of Clostridioides difficile colitis [Z86.19]  Not Applicable   • Acute UTI (urinary tract infection) [N39.0]  Yes   • Leukocytosis [D72.829]  Yes   • Sepsis (CMS/HCC) [A41.9]  Yes   • Peripheral vascular disease (CMS/HCC) [I73.9]  Yes   • S/P BKA (below knee amputation), left (CMS/HCC) [Z89.512]  Not Applicable   • Type 2 diabetes mellitus with circulatory disorder and uncontrolled [E11.59]  Yes   • Essential hypertension [I10]  Yes   • 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        Brief Hospital Course to date:  Kendrick Crystal is a 49 y.o. male with PMHx significant for uncontrolled DMII with neuropathy, HLD, HTN, DUNLAP cirrhosis, PVD s/p L BKA, BPH, perirectal abscess in October 2019, and perinephric abscess in October 2019 who presents with sepsis w/ ct imaging concerning for prostate abscess. Due to previous history of perirectal abscess, Dr. Payne performed flex sigmoidoscopy on 6/11/20 which showed  well healing rectal incision. Urology also followed. MR pelvis on 6/17/20 confirmed extensive prostate/periprostatic abscess w/ small multilocular abscess which was not percutaneously drainable w/ left greater than right adductor muscle edema and mild gluteal edema (but no abscess). Due to development of severe diarrhea, c.diff pcr was sent on 6/17/20 which was positive. Dr. Paez of urology consulted and ultimately performed cystoscopy w/ transurethral resection of prostatic abscess on 6/18/20.     This patient's problems and plans were partially entered by my partner and updated as appropriate by me 07/05/20.     Sepsis (leukocytosis, tachycardia), resolved  Prostate Abscess/UTI  -- Continue Unasyn/Flagyl, oral vanco, IV Mycamine.  Per ID will require 2-4 more weeks of antibiotics.    --Patient has failed multiple courses of PO antibiotics, and home health infusion is not an option.  Declined bed at [place]  --Continue F/C for 1-2 weeks per urology note 6/26  --patient not able to void with childs removal - replaced     C diff colitis:  --Continue PO vanco and IV flagyl  --having about 1 stool per day     S/P DKA (now resolved)  Uncontrolled T2DM (A1C 16.6)   --Continue prandial insulin and SSI  --continue  low dose levemir  --Accu checks     Encephalopathy (resolved)  --multifactorial including sepsis and bipolar disorder     Hx DUNLAP, Hx LBKA     AoCD  --Stable     Depression and question of bipolar affective disorder  --Continue seroquel, cymbalta  --does not follow with psychiatry     Edema  --continue iv lasix  --also due to neurontin and cymbalta as this is common side effect of these meds     DVT Prophylaxis:  heparin  Disposition: I expect the patient to be discharged maybe tomorrow    CODE STATUS:   Code Status and Medical Interventions:   Ordered at: 06/15/20 1640     Limited Support to NOT Include:    Intubation    Cardioversion/Defibrillation    Dialysis    Vasopressors     Level Of Support Discussed  With:    Patient    Next of Kin (If No Surrogate)     Code Status:    No CPR     Medical Interventions (Level of Support Prior to Arrest):    Limited         Electronically signed by Farideh Yousif DO, 07/05/20, 11:37.

## 2020-07-06 NOTE — PROGRESS NOTES
[facility] Progress Note      Antibiotics:  Anti-Infectives (From admission, onward)    Ordered     Dose/Rate Route Frequency Start Stop    20 1142  vancomycin oral solution reconstituted 125 mg     Ordering Provider:  Carlos Alberto Garza MD    125 mg Oral Every 6 Hours Scheduled 20 1800 20 1759    20 1142  amoxicillin-clavulanate (AUGMENTIN) 875-125 MG per tablet 1 tablet     Ordering Provider:  Carlos Alberto Garza MD    1 tablet Oral Every 12 Hours Scheduled 20 1230 20 0859    20 1147  metroNIDAZOLE (FLAGYL) 500 mg/100mL IVPB     Jael Barbosa [facility] reviewed the order on 20 0937.   Ordering Provider:  Mumtaz Paez MD    500 mg  over 60 Minutes Intravenous Every 6 Hours 20 1400 20 0941    20 0916  micafungin 100 mg/100 mL 0.9% NS IVPB (mbp)     Ordering Provider:  Usman Dong MD    100 mg  over 60 Minutes Intravenous Once 20 1015 20 1223    20 0453  vancomycin 2750 mg/500 mL 0.9% NS IVPB ([facility])     Ordering Provider:  Usman Mazariegos [facility]    25 mg/kg × 107 kg  over 180 Minutes Intravenous Once 20 0545 20 0846    20 0446  piperacillin-tazobactam (ZOSYN) 3.375 g in iso-osmotic dextrose 50 ml (premix)     Ordering Provider:  Imelda Paige APRN    3.375 g  over 30 Minutes Intravenous Once 20 0530 20 0604    20 0138  cefTRIAXone (ROCEPHIN) 1 g/50 mL 0.9% NS VTB (mbp)     Ordering Provider:  Kendrick Sotelo MD    1 g  over 30 Minutes Intravenous Once 20 0140 20 0218          CC: nausea    HPI:  Patient is a 49 y.o.  Yr old male with history of poorly contolled T2DM, DUNLAP/liver cirrhosis, HTN, PVD/Left BKA, and multiple skin abscesses/MRSA who presented to [facility] ED with right shin wound infection summer .  He was unable to get to see Dr. Martinez as his father passed away unexpectedly on 18 and he had to wait until after the . The wound worsened becoming gangrenous  "and he came to [facility] ED in August 2018.  He also had been hospitalized at [facility] and then transferred to  for a severe penis/scrotum infection requiring surgical debridement in summer 2018.  He received antibiotics regarding his right leg infection, generally improved over the remainder of summer 2018 but that area had not completely healed. He was readmitted April 24, 2019 with acute left lower abdominal wall redness/swelling and pain, spontaneous drainage associated with fever/chills and uncontrolled blood sugars.  4/26 I&D requiring wide debridement , severe/deep infection and transferred to [facility] on May 3, 2019 with IV Zosyn/oral doxycycline. Cultures had lactobacillus and mixed gram-positive skin sherly including alpha strep, staph epidermidis and corynebacterium. Readmitted to [facility] May 18, 2019, increasing generalized edema ultimately transitioned to oral doxycycline and healed. Numerous other admissions in the past year as summarized in Dr Dong's notes     Readmitted to [facility] on 6/7/20 7/6/20: **Covering for Dr Dong. Discussed with hospitalist, Dr Yousif: pt considering outpatient hospice at discharge. Medicare no longer willing to pay for hospitalization but patient refusing SNF.     Pt reports significant diarrhea but only 2 BM charted yesterday. Sitting up eating potato chips. Comfortable. No abd pain. PICC working. No hematuria, dysuria. No HA, neck pain, chest pain, cough.          ROS:    See above. Otherwise 12 point ROS negative     PE:   /79 (BP Location: Right arm, Patient Position: Lying)   Pulse 71   Temp 97.9 °F (36.6 °C) (Oral)   Resp 18   Ht 190.5 cm (75\")   Wt 107 kg (235 lb)   SpO2 91%   BMI 29.37 kg/m²     GENERAL: comfortable, NAD  HEENT: Normocephalic, atraumatic.  PERRL. EOMI. No conjunctival injection. No icterus.    NECK: Supple without nuchal rigidity   HEART: RRR; No murmur  LUNGS: clear bilaterally, no " cough  ABDOMEN: Soft, obese. Non-tender, nondistended. Positive bowel sounds. No rebound or guarding.    EXT:  No cyanosis, clubbing or edema.    MSK: Right leg wound dressed, CDI  Left BKA site well healed, non-tender  SKIN: Warm and dry without cutaneous eruptions on Inspection/palpation.    NEURO: AOx3, moves all extremities        Laboratory Data    Results from last 7 days   Lab Units 07/06/20  1047 07/05/20  0726 07/02/20  0543   WBC 10*3/mm3 10.59 12.08* 13.61*   HEMOGLOBIN g/dL 8.2* 8.0* 8.0*   HEMATOCRIT % 26.9* 27.6* 26.5*   PLATELETS 10*3/mm3 504* 497* 471*     Results from last 7 days   Lab Units 07/06/20  1047   SODIUM mmol/L 138   POTASSIUM mmol/L 3.8   CHLORIDE mmol/L 102   CO2 mmol/L 29.0   BUN mg/dL 15   CREATININE mg/dL 0.66*   GLUCOSE mg/dL 146*   CALCIUM mg/dL 8.0*     Results from last 7 days   Lab Units 07/02/20  0543   ALK PHOS U/L 111   BILIRUBIN mg/dL 0.2   ALT (SGPT) U/L 7   AST (SGOT) U/L 11               Estimated Creatinine Clearance: 175.1 mL/min (A) (by C-G formula based on SCr of 0.66 mg/dL (L)).      Microbiology:  All recent micro data reviewed:  6/18 prostate abscess cultures with Klebsiella and Candida glabrata and lactobacillus.    Radiology:  Imaging Results (Last 72 Hours)     ** No results found for the last 72 hours. **            Impression:   - Sepsis, due to prostate abscess: improved  - Acute complicated UTI  - Prostate abscess, polymicrobial with Klebsiella, Candida glabrata and possibly lactobacillus. S/p partial prostatectomy on 6/18  - hx of perirectal abscess  - Acute, recurrent C diff: stool frequency improved  - hx of MRSA  - DM2, uncontrolled  - Medical non-compliance  - hx of left BKA  - Left BKA with chronic pain  - Chronic right leg wound   - PVD    Discussed with Dr. Yousif.  Highly complex situation due to multiple recurrent admissions, numerous comorbidities and patient non-compliance, refusal to go to skilled nursing facility.  Per PT/OT he is appropriate  for skilled nursing facility however he refuses placement and thus Medicare will no longer pay for hospitalization after today.  Dr. Yousif and case management have discussed the possibility of going home with outpatient hospice to facilitate care at home.  He seems to be significantly improved with regard to his multiple infections.  Stool frequency improved and C. difficile present appears to be well controlled.  He has had more than 14 days of appropriate antibiotics for his prostate abscess which was resected on 6/18.  Will de-escalate antibiotics to facilitate discharge home with hospice.      PLAN:  - stop IV Unasyn, Flagyl, micafungin  - PO Augmentin 875-125 mg BID x14 more days  - PO Vancomycin 125 mg 4x daily while on Augmentin, then taper    Follow up in clinic with Dr Dong in 2 weeks unless he is in hospice care at that time    Highly complex MDM. Covering for Dr Dong until 7/13     Carlos Alberto Garza MD  7/6/2020

## 2020-07-06 NOTE — OUTREACH NOTE
Prep Survey      Responses   [facility] facility patient discharged from?  [facility]   Is LACE score < 7 ?  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    COVID-19 Test Status  Negative   Does the patient have one of the following disease processes/diagnoses(primary or secondary)?  Sepsis   Does the patient have Home health ordered?  No   Is there a DME ordered?  No   Prep survey completed?  Yes          Veronica Post RN

## 2020-07-06 NOTE — DISCHARGE SUMMARY
[facility] Medicine Services  DISCHARGE SUMMARY    Patient Name: Kendrick Crystal  : 1970  MRN: [id number]    Date of Admission: 2020 11:29 PM  Date of Discharge:  2020  Primary Care Physician: Stanley Carrasco DO    Consults     Date and Time Order Name Status Description    6/15/2020 0030 Inpatient Palliative Care MD Consult Completed     2020 0915 Inpatient Colorectal Surgery Consult Completed     2020 0841 Inpatient Infectious Diseases Consult Completed     2020 0436 Inpatient Urology Consult Completed           Hospital Course     Presenting Problem:   Acute UTI [N39.0]    Active Hospital Problems    Diagnosis  POA   • **Prostate abscess [N41.2]  Yes   • Clostridium difficile colitis [A04.72]  Unknown   • Fall [W19.XXXA]  Yes   • Abscess [L02.91]  Yes   • Left thigh pain [M79.652]  Yes   • Wound of right leg [S81.801A]  Yes   • Acute suppurative prostatitis  [N41.0]  Yes   • Diabetic ketoacidosis without coma associated with type 2 diabetes mellitus (CMS/HCC) [E11.10]  Yes   • Medical non-compliance [Z91.19]  Not Applicable   • Bipolar disorder (CMS/HCC) [F31.9]  Yes   • History of Clostridioides difficile colitis [Z86.19]  Not Applicable   • Acute UTI (urinary tract infection) [N39.0]  Yes   • Leukocytosis [D72.829]  Yes   • Sepsis (CMS/HCC) [A41.9]  Yes   • Peripheral vascular disease (CMS/HCC) [I73.9]  Yes   • S/P BKA (below knee amputation), left (CMS/HCC) [Z89.512]  Not Applicable   • Type 2 diabetes mellitus with circulatory disorder and uncontrolled [E11.59]  Yes   • Essential hypertension [I10]  Yes   • 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          Hospital Course:  Kendrick Crystal is a 49 y.o. male with PMHx significant for uncontrolled DMII with neuropathy, HLD, HTN, DUNLAP cirrhosis, PVD s/p L BKA, BPH, perirectal abscess in 2019, and  perinephric abscess in October 2019 who presents with sepsis w/ ct imaging concerning for prostate abscess. Due to previous history of perirectal abscess, Dr. Payne performed flex sigmoidoscopy on 6/11/20 which showed well healing rectal incision. Urology also followed. MR pelvis on 6/17/20 confirmed extensive prostate/periprostatic abscess w/ small multilocular abscess which was not percutaneously drainable w/ left greater than right adductor muscle edema and mild gluteal edema (but no abscess). Due to development of severe diarrhea, c.diff pcr was sent on 6/17/20 which was positive. Dr. Paez of urology consulted and ultimately performed cystoscopy w/ transurethral resection of prostatic abscess on 6/18/20.     This patient's problems and plans were partially entered by my partner and updated as appropriate by me 07/06/20.     Sepsis (leukocytosis, tachycardia), resolved  Prostate Abscess/UTI  -- Continue Unasyn/Flagyl, oral vanco, IV Mycamine.  Per ID will require 2-4 more weeks of antibiotics.    --Patient has failed multiple courses of PO antibiotics, and home health infusion is not an option.  Declined bed at [facility]  --Continue F/C for 1-2 weeks per urology note 6/26  --patient not able to void with childs removal - replaced, follow up with urology outpatient  --needs childs care     C diff colitis:  --Continue PO vanco and IV flagyl  --having about 1 stool per day     S/P DKA (now resolved)  Uncontrolled T2DM (A1C 16.6)   --Continue prandial insulin and SSI  --continue  low dose levemir  --Accu checks     Encephalopathy (resolved)  --multifactorial including sepsis and bipolar disorder     Hx DUNLAP, Hx LBKA     AoCD  --Stable     Depression and question of bipolar affective disorder  --Continue seroquel, cymbalta  --does not follow with psychiatry     Patient was offered multiple times to go to rehab.  On the day of discharge he was even offered a bed at [facility] today.  However he adamantly refuses to go to  rehab and also refuses to work with physical therapy.  I did contact palliative care who will stop by his house on Thursday for follow-up.  Patient has a state-sponsored aide that provides 40 hours a week of care.  The wife is also disabled and not able to care for him herself so I expect multiple admissions and high risk of death.  The patient is not willing to participate in any type of care, he simply sits in bed and has no motivation to do anything to promote his wellbeing.  I had multiple discussions with him, and he seems like he is content to lay in bed and do nothing.  Today is the last day that his Medicare will pay for a hospital stay as he has been here for almost a month and they are unwilling to self-pay for the hospital admission and again refusing rehab.    Discharge Follow Up Recommendations for outpatient labs/diagnostics:   ID and urology in 2 weeks  Follow-up with PCP in 1 week    Day of Discharge     HPI:   Still refusing to go to rehab    Review of Systems  Gen- No fevers, chills  CV- No chest pain, palpitations  Resp- No cough, dyspnea  GI- No N/V/D, abd pain      Vital Signs:   Temp:  [97.9 °F (36.6 °C)-98.3 °F (36.8 °C)] 97.9 °F (36.6 °C)  Heart Rate:  [71-86] 71  Resp:  [18] 18  BP: (120-157)/(76-92) 120/79     Physical Exam:  Constitutional: No acute distress, awake, alert  HENT: NCAT, mucous membranes moist  Respiratory: Clear to auscultation bilaterally, respiratory effort normal   Cardiovascular: RRR, no murmur  Gastrointestinal: Positive bowel sounds, soft, nontender,  obese  Musculoskeletal: L bka, + edema  Psychiatric: Appropriate affect, non-cooperative  Neurologic: Oriented x 3, speech clear  Skin: No rashes    Pertinent  and/or Most Recent Results     Results from last 7 days   Lab Units 07/06/20  1047 07/05/20  0726 07/02/20  0543 07/01/20  0339   WBC 10*3/mm3 10.59 12.08* 13.61* 13.01*   HEMOGLOBIN g/dL 8.2* 8.0* 8.0* 8.2*   HEMATOCRIT % 26.9* 27.6* 26.5* 27.2*   PLATELETS  10*3/mm3 504* 497* 471* 450   SODIUM mmol/L 138 139 135* 139   POTASSIUM mmol/L 3.8 4.0 3.8 4.5   CHLORIDE mmol/L 102 103 101 103   CO2 mmol/L 29.0 27.0 25.0 26.0   BUN mg/dL 15 13 11 9   CREATININE mg/dL 0.66* 0.54* 0.53* 0.53*   GLUCOSE mg/dL 146* 120* 163* 157*   CALCIUM mg/dL 8.0* 7.7* 7.6* 7.2*     Results from last 7 days   Lab Units 07/02/20  0543   BILIRUBIN mg/dL 0.2   ALK PHOS U/L 111   ALT (SGPT) U/L 7   AST (SGOT) U/L 11           Invalid input(s): TG, LDLCALC, LDLREALC  Results from last 7 days   Lab Units 07/02/20  0543   PROBNP pg/mL 4,035.0*       Brief Urine Lab Results  (Last result in the past 365 days)      Color   Clarity   Blood   Leuk Est   Nitrite   Protein   CREAT   Urine HCG        06/08/20 0048 Dark Yellow Turbid Large (3+) Large (3+) Negative >=300 mg/dL (3+)               Microbiology Results Abnormal     Procedure Component Value - Date/Time    Fungus Culture - Tissue, Prostate [[id number]]  (Abnormal) Collected:  06/18/20 1302    Lab Status:  Preliminary result Specimen:  Tissue from Prostate Updated:  07/05/20 1750     Fungus Culture Moderate growth (3+) Candida glabrata    AFB Culture - Tissue, Prostate [[id number]] Collected:  06/18/20 1302    Lab Status:  Preliminary result Specimen:  Tissue from Prostate Updated:  07/02/20 1330     AFB Culture No AFB isolated at 2 weeks     AFB Stain No acid fast bacilli seen on concentrated smear    COVID PRE-OP / PRE-PROCEDURE SCREENING ORDER (NO ISOLATION) - Swab, Nasopharynx [[id number]] Collected:  06/26/20 0538    Lab Status:  Final result Specimen:  Swab from Nasopharynx Updated:  06/26/20 1136    Narrative:       The following orders were created for panel order COVID PRE-OP / PRE-PROCEDURE SCREENING ORDER (NO ISOLATION) - Swab, Nasopharynx.  Procedure                               Abnormality         Status                     ---------                               -----------         ------                     COVID-19, [facility]  IN-HOUSE...[[id number]]  Normal              Final result                 Please view results for these tests on the individual orders.    COVID-19,[facility] IN-HOUSE, NP SWAB IN TRANSPORT MEDIA 8-12 HR TAT - Swab, Nasopharynx [[id number]]  (Normal) Collected:  06/26/20 0538    Lab Status:  Final result Specimen:  Swab from Nasopharynx Updated:  06/26/20 1136     COVID19 Not Detected    Narrative:       Fact sheet for providers: https://www.fda.gov/media/574564/download     Fact sheet for patients: https://www.fda.gov/media/911125/download    Anaerobic Culture - Tissue, Prostate [[id number]] Collected:  06/18/20 1302    Lab Status:  Final result Specimen:  Tissue from Prostate Updated:  06/23/20 0827     Anaerobic Culture No anaerobes isolated at 5 days    Tissue / Bone Culture - Tissue, Prostate [[id number]]  (Abnormal)  (Susceptibility) Collected:  06/18/20 1302    Lab Status:  Final result Specimen:  Tissue from Prostate Updated:  06/20/20 0940     Tissue Culture Rare Klebsiella pneumoniae ssp pneumoniae      Light growth (2+) Yeast, Not Candida albicans      Light growth (2+) Lactobacillus species     Comment: Lactobacillus species is a component of normal human sherly.  Clindamycin, Penicillin, and Ampicillin generally are the most active in vitro agents.  Resistance to Vancomycin and Aminoglycosides is reported.          Gram Stain Many (4+) WBCs seen      Few (2+) Gram positive bacilli      Few (2+) Gram positive cocci in pairs    Susceptibility      Klebsiella pneumoniae ssp pneumoniae     EYAD     Ampicillin Resistant     Ampicillin + Sulbactam Susceptible     Cefepime Susceptible     Ceftazidime Susceptible     Ceftriaxone Susceptible     Gentamicin Susceptible     Levofloxacin Susceptible     Piperacillin + Tazobactam Susceptible     Tetracycline Susceptible     Trimethoprim + Sulfamethoxazole Susceptible                Susceptibility Comments     Klebsiella pneumoniae ssp pneumoniae    Cefazolin sensitivity will  not be reported for Enterobacteriaceae in non-urine isolates. If cefazolin is preferred, please call the microbiology lab to request an E-test.  With the exception of urinary-sourced infections, aminoglycosides should not be used as monotherapy.             Clostridium Difficile Toxin - Stool, Per Rectum [[id number]] Collected:  06/17/20 0930    Lab Status:  Final result Specimen:  Stool from Per Rectum Updated:  06/17/20 1114    Narrative:       The following orders were created for panel order Clostridium Difficile Toxin - Stool, Per Rectum.  Procedure                               Abnormality         Status                     ---------                               -----------         ------                     Clostridium Difficile To...[[id number]]  Abnormal            Final result                 Please view results for these tests on the individual orders.    Clostridium Difficile Toxin, PCR - Stool, Per Rectum [[id number]]  (Abnormal) Collected:  06/17/20 0930    Lab Status:  Final result Specimen:  Stool from Per Rectum Updated:  06/17/20 1114     C. Difficile Toxins by PCR Detected    Narrative:       Performance characteristics of test not established for patients <2 years of age.    Blood Culture - Blood, Hand, Left [[id number]] Collected:  06/08/20 0249    Lab Status:  Final result Specimen:  Blood from Hand, Left Updated:  06/13/20 0516     Blood Culture No growth at 5 days    Blood Culture - Blood, Hand, Left [[id number]] Collected:  06/08/20 0239    Lab Status:  Final result Specimen:  Blood from Hand, Left Updated:  06/13/20 0516     Blood Culture No growth at 5 days    Urine Culture - Urine, Urine, Catheter [[id number]]  (Abnormal)  (Susceptibility) Collected:  06/08/20 0048    Lab Status:  Final result Specimen:  Urine, Catheter Updated:  06/10/20 0227     Urine Culture >100,000 CFU/mL Klebsiella pneumoniae ssp pneumoniae    Susceptibility      Klebsiella pneumoniae ssp pneumoniae     EYAD      Ampicillin Resistant     Ampicillin + Sulbactam Susceptible     Cefazolin Susceptible     Cefepime Susceptible     Ceftazidime Susceptible     Ceftriaxone Susceptible     Gentamicin Susceptible     Levofloxacin Susceptible     Nitrofurantoin Intermediate     Piperacillin + Tazobactam Susceptible     Tetracycline Susceptible     Trimethoprim + Sulfamethoxazole Susceptible                    COVID PRE-OP / PRE-PROCEDURE SCREENING ORDER (NO ISOLATION) - Swab, Nasopharynx [[id number]] Collected:  06/08/20 0546    Lab Status:  Final result Specimen:  Swab from Nasopharynx Updated:  06/08/20 0654    Narrative:       The following orders were created for panel order COVID PRE-OP / PRE-PROCEDURE SCREENING ORDER (NO ISOLATION) - Swab, Nasopharynx.  Procedure                               Abnormality         Status                     ---------                               -----------         ------                     COVID-19,CEPHE[facility] IN-...[[id number]]  Normal              Final result                 Please view results for these tests on the individual orders.    COVID-19,[facility] IN-HOUSE(OR EMERGENT/ADD-ON),NP SWAB IN TRANSPORT MEDIA 3-4 HR TAT - Swab, Nasopharynx [[id number]]  (Normal) Collected:  06/08/20 0546    Lab Status:  Final result Specimen:  Swab from Nasopharynx Updated:  06/08/20 0654     COVID19 Not Detected          Imaging Results (All)     Procedure Component Value Units Date/Time    XR Chest 1 View [[id number]] Collected:  06/27/20 1249     Updated:  06/27/20 1807    Narrative:          EXAMINATION: XR CHEST 1 VW - 06/27/2020     INDICATION: PICC placement. N39.0-Urinary tract infection, site not  specified; R33.9-Retention of urine, unspecified; D72.829-Elevated white  blood cell count, unspecified; R73.9-Hyperglycemia, unspecified;  R04.0-Epistaxis; G89.29-Other chronic pain; R65.10-Systemic inflammatory  response syndrome (sirs) of non-infectious origin without acute organ  dysfunction;  N41.2-Abscess of prostate.     COMPARISON: NONE     FINDINGS: Portable chest reveals heart to be enlarged. Mild increased  markings identified within the left lung base. Calcified granuloma  identified within the left lower lobe. Degenerative changes seen within  the spine. Mild increased markings seen within the right upper lung  field. PICC line catheter on the left with tip in the SVC           Impression:       Mild increased markings seen within the right upper and left  lower lung field. PICC line catheter on the left with tip in the SVC.     DICTATED:   06/27/2020  EDITED/ls :   06/27/2020      This report was finalized on 6/27/2020 6:04 PM by Dr. Nella Enriquez MD.       MRI Pelvis With & Without Contrast [[id number]] Collected:  06/17/20 0810     Updated:  06/20/20 1123    Narrative:       EXAMINATION: MRI PELVIS W WO CONTRAST-06/17/2020:      INDICATION: Acute suppurative prostatitis with large prostatic abscess,  also concern for inflammatory vs neoplastic thickening of left lower  pelvic sidewall; N39.0-Urinary tract infection, site not specified;  R33.9-Retention of urine, unspecified; D72.829-Elevated white blood cell  count, unspecified; R73.9-Hyperglycemia, unspecified; R04.0-Epistaxis;  G89.29-Other chronic pain; R65.10-Systemic inflammatory re.     TECHNIQUE: Axial, sagittal and coronal fat and water weighted sequences  through the abdomen, axial diffusion-weighted images, pre and  postcontrast T1 fat-sat images of the pelvis.     COMPARISON: CT scan 06/08/2020.     FINDINGS: Diffusion-weighted images may give the clearest picture of  extent of disease in and adjacent to the prostate. Diffusion series 12,  images 57-84 show an approximately 8 x 6 cm abscess that appears to  emanate from the prostate, and also left pelvic sidewall multilocular  abscess extending over a roughly 7 cm area. There are T2-weighted  changes along the medial margin of the acetabulum, diffuse edema of the  left-sided  adductor musculature, to a lesser extent the left anterior  thigh, and lateral margin of the left gluteus muscle that may represent  incidental edema as from strain injury or secondary inflammation but  there is no evidence of abscess in these locations. Similar but much  milder changes are seen in the right-sided musculature in these  locations. No pathologic marrow signal changes are identified to suggest  osteomyelitis. The bowel loops are normal in caliber and normal in  appearance. The bladder is decompressed by a Walker balloon catheter.       Impression:       1. Extensive prostate/periprostatic abscess as seen on CT scan. Left  pelvic sidewall involvement with small multilocular abscess which does  not appear to be percutaneously drainable.  2. Left greater than right generalized adductor muscle edema, milder  gluteal edema, with no evidence of underlying abscess.  3. No evidence of osteomyelitis. No evidence of other intrapelvic  abscess elsewhere.     D:  06/17/2020  E:  06/17/2020     This report was finalized on 6/20/2020 11:20 AM by Dr. Marquise Stafford MD.       CT Abdomen Pelvis With & Without Contrast [[id number]] Collected:  06/08/20 0424     Updated:  06/08/20 0426    Narrative:       CT ABDOMEN AND PELVIS, MULTIPHASE RENAL, 6/8/2020    HISTORY:  49-year-old male in the ED with abdomen pain, urinary tract infection, urinary retention and elevated white blood cell count.    TECHNIQUE:  CT imaging of the abdomen and pelvis before and after contrast using multiphase renal protocol. Radiation dose reduction techniques included automated exposure control. Radiation audit for CT and nuclear cardiology exams in the last 12 months: 12.    COMPARISON:  *  CT abdomen/pelvis, 11/18/2019.    RENAL/URINARY FINDINGS:  The prostate is abnormal. Low-attenuation mass in the posterior prostate measuring 4.5 x 2.4 x 3.6 cm concerning for prostatic abscess. There has considerable asymmetric distention of the seminal vesicles,  greater on the left. Infiltrative tumor  involving the prostate or seminal vesicles is not excluded.    Soft tissue stranding extends from the prostate and seminal vesicles along the left pelvic sidewall along the lower levator musculature measuring up to 2.1 cm in thickness (see series #6, image 86). This could also represent inflammatory change versus  infiltrative neoplasm. All of these findings are new since the prior exam.    A Walker catheter is present within decompressed urinary bladder. Kidneys and ureters are negative with no evidence of upper urinary tract obstruction or mass.    Scattered mildly enlarged lymph nodes are present within the mid and upper presacral pelvis, most of which measure only a few millimeters in size. This may represent reactive or neoplastic adenopathy.    ABDOMEN FINDINGS:  Lobulated liver margin suggesting cirrhosis, correlate clinically. No splenomegaly or ascites. No visible focal liver lesion. Hepatic vasculature is patent. No gallbladder distention or bile duct dilatation. Negative pancreas. Normal caliber abdominal  aorta.    Small bowel and colon are within normal limits. No gastric distention or distal esophageal dilatation.    PELVIS FINDINGS:  Prostate, seminal vesicles and bladder as noted above. Rectum is negative. No inguinal hernia or adenopathy.    Limited lung base images show no active disease.      Impression:       1.  Findings highly suspicious for acute suppurative prostatitis with large prostatic abscess. There is asymmetric distention of the seminal vesicles, greater on the left, likely obstructed at the level of the prostate. Neoplastic infiltration is not  excluded.  2.  Soft tissue thickening extending into the left lower pelvic sidewall from the region of the prostate and seminal vesicles. This may also be inflammatory or neoplastic.  3.  Mild upper presacral pelvic adenopathy.  4.  Urinary bladder decompressed with Walker catheter. No evidence of upper  urinary tract obstruction.    Signer Name: Fadi Healy MD   Signed: 6/8/2020 4:24 AM   Workstation [facility]: [facility]    [facility]    XR Chest 1 View [[id number]] Collected:  06/08/20 0316     Updated:  06/08/20 0318    Narrative:       CHEST X-RAY, 6/8/2020      HISTORY:    49-year-old male in the ED complaining of generalized weakness.      TECHNIQUE:  AP portable chest x-ray      FINDINGS:  Low lung volumes. The lungs appear clear. No visible pulmonary infiltrate or pleural effusion. Heart size and pulmonary vascularity are within normal limits.      Impression:       Negative chest.    Signer Name: Fadi Healy MD   Signed: 6/8/2020 3:16 AM   Workstation [facility]    [facility]    CT Facial Bones Without Contrast [[id number]] Collected:  06/08/20 0042     Updated:  06/08/20 0044    Narrative:       CT FACIAL BONES, 6/7/2020    HISTORY:  49-year-old male in the ED with head and facial injury after a fall. Bloody nose.    TECHNIQUE:  Axial CT images through the facial skull with multiplanar image reconstruction. Radiation dose reduction techniques included automated exposure control. Radiation audit for CT and nuclear cardiology exams in the last 12 months: 10.    FINDINGS:  Some images are significantly degraded by patient motion artifact.    Soft tissue swelling over the nose with at least one tiny foreign body seen along the nasal skin surface. No acute nasal fracture or nasal septal fracture is visible.    No evidence of fracture involving the orbits, maxillofacial skull or mandible. There is no air or fluid within the orbits. No fluid levels within the paranasal sinuses. No TMJ dislocation.      Impression:       1. Significantly motion limited examination.  2. No gross evidence of acute facial fracture.  3. Soft tissue swelling over the nose.    Signer Name: Fadi Healy MD   Signed: 6/8/2020 12:42 AM   Workstation Name:  [facility]    [facility]    CT Head Without Contrast [[id number]] Collected:  06/08/20 0038     Updated:  06/08/20 0040    Narrative:       CT HEAD, NONCONTRAST, 6/8/2020    HISTORY:  49-year-old male in the ED with head injury after fall. Head pain and bloody nose.    TECHNIQUE:  CT imaging of the head without IV contrast. Radiation dose reduction techniques included automated exposure control. Radiation audit for CT and nuclear cardiology exams in the last 12 months: 10.    FINDINGS:  Many images are significantly degraded by patient motion artifact. Multiple images were repeated with only modest improvement.    No acute intracranial abnormality is demonstrated. No visible skull fracture.    Mild generalized cerebral volume loss, somewhat advanced for the patient's age. Chronic lacunar infarct in the left lentiform nucleus. Mild diffuse chronic small vessel type white matter changes.      Impression:       1.  Motion limited examination.  2.  No gross evidence of acute intracranial abnormality or skull fracture.  3.  Diffuse chronic changes as noted above.    Signer Name: Fadi Healy MD   Signed: 6/8/2020 12:38 AM   Workstation Name: [facility]    [facility]          Results for orders placed during the hospital encounter of 06/07/20   Duplex Lower Extremity Art / Grafts - Left CAR    Narrative · The left common femoral artery demonstrates <50% stenosis.  · The left proximal deep femoral artery demonstrates no significant   stenosis.  · The left proximal superficial femoral artery demonstrates no significant   stenosis.  · The left middle superficial femoral artery demonstrates no significant   stenosis.  · The left distal superficial femoral artery demonstrates 76-99% stenosis.  · The left popliteal artery demonstrates 76-99% stenosis.          Results for orders placed during the hospital encounter of 06/07/20   Duplex Lower Extremity Art / Grafts -  Left CAR    Narrative · The left common femoral artery demonstrates <50% stenosis.  · The left proximal deep femoral artery demonstrates no significant   stenosis.  · The left proximal superficial femoral artery demonstrates no significant   stenosis.  · The left middle superficial femoral artery demonstrates no significant   stenosis.  · The left distal superficial femoral artery demonstrates 76-99% stenosis.  · The left popliteal artery demonstrates 76-99% stenosis.          Results for orders placed during the hospital encounter of 11/18/19   Adult Transthoracic Echo Complete W/ Cont if Necessary Per Protocol    Narrative · Estimated EF = 45%.  · Left atrial cavity size is mildly dilated.  · There is calcification of the aortic valve.  · Left ventricular systolic function is mildly decreased.          Plan for Follow-up of Pending Labs/Results:    Order Current Status    AFB Culture - Tissue, Prostate Preliminary result    Fungus Culture - Tissue, Prostate Preliminary result        Discharge Details        Discharge Medications      New Medications      Instructions Start Date   amoxicillin-clavulanate 875-125 MG per tablet  Commonly known as:  AUGMENTIN   1 tablet, Oral, Every 12 Hours Scheduled      fentaNYL 12 MCG/HR  Commonly known as:  DURAGESIC   1 patch, Transdermal, Every 72 Hours Scheduled   Start Date:  July 8, 2020     gabapentin 300 MG capsule  Commonly known as:  NEURONTIN   300 mg, Oral, Nightly PRN      oxyCODONE 5 MG immediate release tablet  Commonly known as:  ROXICODONE   5 mg, Oral, Every 4 Hours PRN      QUEtiapine 25 MG tablet  Commonly known as:  SEROquel   12.5 mg, Oral, Nightly      vancomycin 50 MG/ML reconstituted solution oral solution reconstituted   125 mg, Oral, Every 6 Hours Scheduled         Changes to Medications      Instructions Start Date   amLODIPine 10 MG tablet  Commonly known as:  NORVASC  What changed:  when to take this   10 mg, Oral, Every 24 Hours Scheduled         aspirin 81 MG tablet  What changed:  additional instructions   81 mg, Oral, Daily      nystatin 886384 UNIT/GM cream  Commonly known as:  MYCOSTATIN  What changed:  how much to take   Topical, 2 Times Daily      terazosin 10 MG capsule  Commonly known as:  HYTRIN  What changed:    · medication strength  · how much to take   10 mg, Oral, Nightly         Continue These Medications      Instructions Start Date   DULoxetine 60 MG capsule  Commonly known as:  CYMBALTA   60 mg, Oral, Daily      Insulin Glargine 100 UNIT/ML injection pen  Commonly known as:  LANTUS SOLOSTAR   20 Units, Subcutaneous, Every 12 Hours      Insulin Lispro (1 Unit Dial) 100 UNIT/ML solution pen-injector  Commonly known as:  HUMALOG   4 Units, Subcutaneous, 3 Times Daily With Meals      melatonin 5 MG tablet tablet   5 mg, Oral, Nightly PRN      metoprolol tartrate 100 MG tablet  Commonly known as:  LOPRESSOR   100 mg, Oral, Every 12 Hours      oxyCODONE-acetaminophen  MG per tablet  Commonly known as:  PERCOCET   1 tablet, Oral, 2 Times Daily         Stop These Medications    hydrALAZINE 50 MG tablet  Commonly known as:  APRESOLINE            No Known Allergies      Discharge Disposition:  Home or Self Care    Diet:  Hospital:  Diet Order   Procedures   • Diet Regular       Activity:  get out of bed    Restrictions or Other Recommendations:  Recommend rehab       CODE STATUS:    Code Status and Medical Interventions:   Ordered at: 06/15/20 1640     Limited Support to NOT Include:    Intubation    Cardioversion/Defibrillation    Dialysis    Vasopressors     Level Of Support Discussed With:    Patient    Next of Kin (If No Surrogate)     Code Status:    No CPR     Medical Interventions (Level of Support Prior to Arrest):    Limited       No future appointments.    Additional Instructions for the Follow-ups that You Need to Schedule     Ambulatory Referral to Home Health   As directed      Face to Face Visit Date:  7/2/2020    Follow-up  provider for Plan of Care?:  I treated the patient in an acute care facility and will not continue treatment after discharge.    Follow-up provider:  ARLETTE GIMENEZ [[id number]]    Reason/Clinical Findings:  Prostate abcess    Describe mobility limitations that make leaving home difficult:  amputee, does not drive    Nursing/Therapeutic Services Requested:  Skilled Nursing (childs care, if home with it, wound/skin care) Physical Therapy    Skilled nursing orders:  Medication education    PT orders:  Therapeutic exercise Gait Training Transfer training    Weight Bearing Status:  As Tolerated    Frequency:  Other (2-3 times a week)                     Electronically signed by Farideh Yousif DO, 07/06/20, 1:09 PM.      Time Spent on Discharge:  I spent 39  minutes on this discharge activity which included: face-to-face encounter with the patient, reviewing the data in the system, coordination of the care with the nursing staff as well as consultants, documentation, and entering orders.

## 2020-07-06 NOTE — PROGRESS NOTES
Continued Stay Note  [facility]     Patient Name: Kendrick Crystal  MRN: [id number]  Today's Date: 7/6/2020    Admit Date: 6/7/2020    Discharge Plan     Row Name 07/06/20 0956       Plan    Plan  [facility] offered bed, patient plans to go home     Plan Comments  [facility] still has bed offer for today for skilled.  Have reviewed with patient in the room  And with  his wife, Cindy (over the phone). He is thinking about it. Cindy had questions about assisted living facilities and I reivewed this information with her to the best of my ability.     11:10: I received a call from Homer Khan RN with [facility], patient tells Homer he declines rehab placement and that he is going home.  I have notified home health speaking with Nicolas Hernandez RN, home infusion speaking with [facility] pharmacist. They will bring his oral vancomycin to hospital.     Cindy Crystal tells me their friend and Medicaid waiver attendant, Nadir will be there in the home today. He will continue with the medicaid waiver program, 40 hours a week. Dr. Yousif has reached out for palliative services in the home,     Cindy tells me he needs a new wheelchair (he is using a family members) and a hospital bed. He had had one from another agency and currently does not have. I have placed orders and will give referral to [facility].They are aware they will not be delivered today.     [facility] is set for 14:00 today. [facility] has been notified.                     Final Discharge Disposition Code  30 - still a patient        Discharge Codes    No documentation.       Expected Discharge Date and Time     Expected Discharge Date Expected Discharge Time    Jul 6, 2020             Brenda Moscoso RN

## 2020-07-06 NOTE — PROGRESS NOTES
"                  Clinical Nutrition     Reason for Visit:   Follow-up protocol    Patient Name: Kendrick Crystal  YOB: 1970  MRN: [id number]  Date of Encounter: 07/06/20 08:21  Admission date: 6/7/2020    Nutrition Assessment   Assessment     Admission diagnosis  Sepsis, resolved    Additional diagnosis/conditions/procedures this admission  UTI  L thigh pain  Fall  RLE wound, chronic  Psuedohyponatremia, resolved  Acute urinary retention  Prostate abscess  DKA, resolved  Delirium, improving  Anemia  C.diff+ (6/17)    (6/8) WOC - 6.5 mc x 8.0 cm x 0.2 cm RLE  (6/11) s/p flexible sigmoidoscopy  (6/18) s/p CTURP  (6/23) WOC - wound to right lower laterar/posterior ankle, measures 6.0 cm x 4.5 cm x 0.1 cm  (6/30) WOC - Right ankle wound 5.5 X 4.5 X 0.2 cm; Right shin wound 2.1 X 3.9 X 0.1 cm    Additional PMH/procedures:  HLP  HTN  DM  MRSA infection  DUNLAP cirrhosis, stage IV fibrosis  Neuropathy  Depression  Lissa's gangrene  CKD3  Bipolar 1 d/o  Tobacco    RLE I&D (5/2020)  Hemorrhoidectomy (8/2019)  Abdominal wall I&D (4/2019)  L BKA (3/2017)  L 4th/5th toe amputation (1/2017)    Reported/Observed/Food/Nutrition Related History:      Patient is isolation precautions d/t C. Diff infection. Patient with average oral intake, appears to be improving today. 100% of breakfast meal consumed. Case management working with patient on discharge planning.      Anthropometrics     Height: 190.5 cm (75\")  Last filed wt: Weight: 107 kg (235 lb) (06/08/20 1021)    BMI: N/A, L BKA    Ideal Body Weight (IBW) (kg): 90.45  Admission wt: 235 lbs  Method obtained: weight per charting 6/7 no method listed    Labs reviewed     Results from last 7 days   Lab Units 07/05/20  0726 07/02/20  0543 07/01/20  0339   GLUCOSE mg/dL 120* 163* 157*   BUN mg/dL 13 11 9   CREATININE mg/dL 0.54* 0.53* 0.53*   SODIUM mmol/L 139 135* 139   CHLORIDE mmol/L 103 101 103   POTASSIUM mmol/L 4.0 3.8 4.5   PHOSPHORUS mg/dL 3.7  --   --    ALT " (SGPT) U/L  --  7  --      Results from last 7 days   Lab Units 07/05/20  0726 07/02/20  0543   ALBUMIN g/dL 2.10* 2.10*       Results from last 7 days   Lab Units 07/06/20  0746 07/05/20  2041 07/05/20  1735 07/05/20  1625 07/05/20  1144 07/05/20  0837   GLUCOSE mg/dL 137* 185* 148* 146* 179* 172*     Lab Results   Lab Value Date/Time    HGBA1C 16.60 (H) 05/27/2020 1103    HGBA1C 16.90 (H) 05/19/2020 1310       Medications reviewed   Pertinent: IV abx, gabapentin, insulin, zofran, flagyl, seroquel, pericolace      Intake/Output 24 hrs (7:00AM - 6:59 AM)     Intake & Output (last day)       07/05 0701 - 07/06 0700 07/06 0701 - 07/07 0700    Urine (mL/kg/hr) 2000 (0.8)     Stool      Total Output 2000     Net -2000           Stool Unmeasured Occurrence 1 x         Needs Assessment (7/6)     Height used 190.5 cm (75 in)   Weight used 107 kg (235 lb)   Adjusted IBW  83.8 kg (184 lbs)     Estimated need Method/Equation used Result    Energy/Calorie need   ~1800 kcals/d  25 x adjusted IBW  14 - 18 kcal/kg actBW  2095 kcal   1498 - 1926 kcal             Protein   ~105 g/day  1.0 g/kg act   1.2 g/kg  g  107 g            Current Nutrition Prescription     PO: Diet Regular  Dietary Nutrition Supplements: Premier Protein x2/d  Intake: 67% x 3 meals    Nutrition Diagnosis     6/17 updated 6/19, 6/23, 6/26, 7/1, 7/6  Problem Inadequate oral intake   Etiology Clinical status   Signs/Symptoms PO Intake (50% x 4 meals)   Status: resolving per current trend in PO intake    Nutrition Intervention     1. Follow treatment progress, Care plan reviewed  2. Cont to send ONS for increase kcal/protein  3. RD notes A1c of 16.6%. Patient with history of multiple previous admissions where clinical nutrition has provided/offered nutrition education. Patient continues to be openly noncompliant with insulin regimen    Goal:   General: Nutrition support treatment  PO: Continue positive trend, Meet estimated  needs      Monitoring/Evaluation:   Per protocol, PO intake, Supplement intake, Pertinent labs, POC/GOC    Will Continue to follow per protocol    Atiya Keys RDN, LD  Time Spent: 25 minutes

## 2020-07-07 PROBLEM — I50.9 ACUTE ON CHRONIC HEART FAILURE (HCC): Status: ACTIVE | Noted: 2020-01-01

## 2020-07-07 PROBLEM — K65.1 ABSCESS OF ABDOMINAL CAVITY (HCC): Status: ACTIVE | Noted: 2019-04-25

## 2020-07-07 NOTE — OUTREACH NOTE
Call Center TCM Note      Responses   [facility] patient discharged from?  [place]   COVID-19 Test Status  Negative   Does the patient have one of the following disease processes/diagnoses(primary or secondary)?  Sepsis   TCM attempt successful?  Yes   Call start time  1119   Call end time  1122   Discharge diagnosis  Acute UTI  Sepsis Liver cirrhosis secondary to DNULAP no motivation to do anything to promote his wellbeing.    Is patient permission given to speak with other caregiver?  Yes   List who call center can speak with  spouse- Cindy   Person spoke with today (if not patient) and relationship  spouse- Cindy   Meds reviewed with patient/caregiver?  Yes   Is the patient having any side effects they believe may be caused by any medication additions or changes?  No   Does the patient have all medications related to this admission filled (includes all antibiotics, inhalers, nebulizers,steroids,etc.)  Yes   Is the patient taking all medications as directed (includes completed medication regime)?  Yes   Does the patient have a primary care provider?   Yes   Comments regarding PCP  f/u on 7/21 with CHINTAN Hall   Does the patient have an appointment with their PCP within 7 days of discharge?  Greater than 7 days   Has the patient kept scheduled appointments due by today?  N/A   What is the patient's perception of their health status since discharge?  Worsening   Is the patient/caregiver able to teach back the hierarchy of who to call/visit for symptoms/problems? PCP, Specialist, Home health nurse, Urgent Care, ED, 911  Yes   TCM call completed?  Yes   Wrap up additional comments  Per spouse, pt was taken by ambulance to [facility], pt is not doing well.          Diane Carrasco RN    7/7/2020, 11:23

## 2020-07-08 PROBLEM — J96.90 TYPE 1 RESPIRATORY FAILURE (HCC): Status: ACTIVE | Noted: 2019-11-18

## 2020-07-08 NOTE — OUTREACH NOTE
Sepsis Week 2 Survey      Responses   [facility] patient discharged from?  [place]   COVID-19 Test Status  Negative   Does the patient have one of the following disease processes/diagnoses(primary or secondary)?  Sepsis   Week 2 attempt successful?  No   Revoke  Readmitted          Fariba Nash RN

## 2020-08-25 NOTE — TELEPHONE ENCOUNTER
PER BELLA WITH [facility] CALLED, STATED PT WAS DISCHARGED FROM A SKILLED NURSING FACILITY. PT IS REQUESTING A ARTURO LIFT FOR THE HOME.    PLEASE FAX ORDER TO [facility]  ALSO, PHARMACY WILL NEED A NOTE STATING WHY THE LIFT WILL BENEFIT PT.        PHONE: [phone]

## 2020-08-27 NOTE — TELEPHONE ENCOUNTER
Left really should have been ordered by the rehab facility or treating physicians, I have not seen the patient for any if his current ailments to evaluate his need for Lyndsey lift.  May need a note from physical therapy.  Orders placed in in chart in the meantime.

## 2020-08-27 NOTE — TELEPHONE ENCOUNTER
Ayaka from [facility] states that he saw the patient for evaluation and would like to continue for 1 week 6 for strengthening, mobility and paitent education.  Also, wanted to see if he could get an order for a  to evalute him.  He is asking for a Trapeze bar for his hospital bed and the [facility] needs to [facility] sent to  [facility], Fax No - [phone]

## 2020-08-27 NOTE — TELEPHONE ENCOUNTER
[facility] AND AN ORDER FOR  A GEL OVERLAY FOR HIS HOSPITAL BED.      LILIA  [phone]

## 2020-08-27 NOTE — TELEPHONE ENCOUNTER
PATIENT WIFE CALLED STATING PATIENT WAS SENT HOME FROM REHAB ON 8/24/20     PATIENT IS NEEDING AN ORDER PLACED FOR A ARTURO LIFT.    PATIENT IS REQUESTING A CALL BACK TO BE ADVISED ON WHAT TO DO TO OBTAIN ORDER    CALL BACK NUMBER [phone]

## 2020-08-27 NOTE — TELEPHONE ENCOUNTER
Critical access hospital REQUESTING CALL BACK  REGARDING ALBUTEROL RX THAT WAS ON DISCHARGE PAPERS; ALSO NEEDS EQUIPMENT      PHARMACY    [facility] [place] [address] - [phone]  - [phone] [facility] CALL BACK    [phone]

## 2020-08-28 NOTE — TELEPHONE ENCOUNTER
Concepcion johnson [facility] contacted verbal ok given for OT, other orders faxed to [phone]

## 2020-09-02 NOTE — TELEPHONE ENCOUNTER
Concepcion with [facility] called and asked for a verbal release from Occupational Therapy.    Please advise

## 2020-09-03 NOTE — TELEPHONE ENCOUNTER
PER RE WITH [facility] CALLED, IS REQUESTING VERBAL ORDERS FOR PT/TRANSVERSE STRENGTHENING AND BED MOBILITY.      PHONE: [phone]

## 2020-09-03 NOTE — TELEPHONE ENCOUNTER
Mrs. Tipton called stating that patient is not able to move or use his right leg. There are several blisters on leg. He went to the ER at [facility] yesterday to be evaluated and they wanted to admit him but he refused because he wanted to be treated at [facility]. The family is not able to get him into the car to bring him to [facility]. I explained that he needs to be evaluated and he may have to call ambulance service to bring him to local ER. Mrs. Crystal understood and she would let me know what they decided to do.    Carlyle

## 2020-09-04 NOTE — ED PROVIDER NOTES
EMERGENCY DEPARTMENT ENCOUNTER    Room Number:  22/22  Date of encounter:  9/3/2020  PCP: Stanley Carrasco DO  Historian: Patient and his wife      HPI:  Chief Complaint: Right lower leg pain.    A complete HPI/ROS/PMH/PSH/SH/FH are unobtainable due to: NA    Context: Kendrick Crystal is a 49 y.o. male who presents to the ED c/o progressively worsening right lower leg pain since wound debridement in May.  Patient states he was to see Dr. Martinez in the office today, but was unable to be seen, and he was referred to the emergency department.  Patient denies fevers chills or sweats.  There has been no drainage from the wound.  He states he has a history of diabetic neuropathy, and has very little sensation to his leg and foot however he is experiencing increased pain in the area where the wound was debrided.  No increased swelling.  Patient has not taken any of his medications today.      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 (CMS/[facility]) 01/17/2017   • Essential hypertension 01/17/2017   • Non-compliance 01/17/2017   • Hyperlipemia 01/17/2017   • Hypertriglyceridemia, essential 01/18/2017   • Hypomagnesemia 02/13/2017   • Type 2 diabetes mellitus with circulatory disorder and uncontrolled 04/25/2017   • History of MRSA infection 04/25/2017   • Diabetic ulcer of right lower leg (CMS/[facility]) 07/29/2018   • S/P BKA (below knee amputation), left (CMS/[facility]) 08/23/2018   • Peripheral vascular disease (CMS/[facility]) 01/04/2019   • Abscess of abdominal cavity (CMS/H[facility]) 04/25/2019   • Obesity (BMI 30-39.9) 05/20/2019   • Elevated troponin 05/30/2019   • Sepsis (CMS/[facility]) 07/30/2019   • Leukocytosis 08/17/2019   • Acute UTI (urinary tract infection) 10/08/2019   • Pneumaturia 11/08/2019   • Gastroparesis due to secondary diabetes (CMS/[facility]) 11/14/2019   • History of Clostridioides difficile colitis 11/14/2019   • Bipolar disorder (CMS/[facility]) 11/14/2019   • Type 1  Respiratory Failure 11/18/2019   • Diastolic CHF, chronic (CMS/[facility]) 11/18/2019   • Pleural effusion due to CHF (congestive heart failure) (CMS/[facility]) 11/18/2019   • Medical non-compliance 11/19/2019   • Self neglect 11/19/2019   • Fall 06/08/2020   • Abscess 06/08/2020   • Left thigh pain 06/08/2020   • Wound of right leg 06/08/2020   • Acute suppurative prostatitis  06/08/2020   • Prostate abscess 06/08/2020   • Diabetic ketoacidosis without coma associated with type 2 diabetes mellitus (CMS/[facility]) 06/08/2020   • Clostridium difficile colitis 06/17/2020   • Acute on chronic heart failure (CMS/[facility]) 07/07/2020     Resolved Ambulatory Problems     Diagnosis Date Noted   • Type 1 diabetes mellitus with neurological manifestations, uncontrolled (CMS/[facility]) 01/17/2017   • Type 2 diabetes mellitus with hyperosmolarity without nonketotic hyperglycemic-hyperosmolar coma ([facility]) (CMS/[facility]) 01/17/2017   • Arthritis 01/17/2017   • Diabetic foot ulcer associated with diabetes mellitus due to underlying condition (CMS/[facility]) 01/18/2017   • Cellulitis of left foot 01/25/2017   • Poorly controlled type 2 diabetes mellitus with peripheral neuropathy (CMS/[facility]) 01/25/2017   • Type 2 diabetes mellitus with foot ulcer, with long-term current use of insulin (CMS/[facility]) 01/25/2017   • DUNLAP (nonalcoholic steatohepatitis) 01/25/2017   • Hyponatremia 01/25/2017   • Neutrophilic leukocytosis 01/25/2017   • Uncontrolled IDDM with circulatory complication 02/13/2017   • Diabetic foot ulcer (CMS/[facility]) 02/13/2017   • Encephalopathy, hepatic (CMS/[facility])    • Hypertension    • Osteomyelitis of left foot (CMS/[facility]) 02/28/2017   • Sepsis affecting skin 02/28/2017   • Diabetic foot infection (CMS/[facility]) 02/28/2017   • Diabetic neuropathy (CMS/[facility]) 02/28/2017   • Wound infection of left leg 04/25/2017   • Dysthymia 04/25/2017   • Cellulitis of right anterior lower leg 07/29/2018   • DM (diabetes mellitus) type II uncontrolled, periph vascular disorder (CMS/[facility])  04/25/2019   • Acute urinary retention 04/30/2019   • Anasarca 05/19/2019   • Abdominal wall cellulitis 05/20/2019   • Pleural effusion, bilateral 05/20/2019   • Intractable nausea and vomiting 05/30/2019   • Abscess 07/30/2019   • UTI (urinary tract infection) 07/30/2019   • Hyponatremia 07/30/2019   • Lactic acidosis 08/17/2019   • Perirectal abscess I&D 8/2/19 08/17/2019   • Dehydration 08/17/2019   • Urinary retention 08/22/2019   • Bacteremia due to Klebsiella pneumoniae 10/09/2019     Past Medical History:   Diagnosis Date   • JUAN (acute kidney injury) (CMS/[facility]) 7/29/2018   • Bipolar 1 disorder (CMS/[facility])    • Chronic kidney disease, stage 3 (CMS/[facility])    • Cirrhosis (CMS/[facility])    • Counseling for insulin pump    • Depression    • Diabetes mellitus (CMS/[facility])    • H/O degenerative disc disease    • History of Lisas's gangrene    • MRSA infection    • multiple Skin abscesses    • Neuropathy    • Thrombophlebitis          PAST SURGICAL HISTORY  Past Surgical History:   Procedure Laterality Date   • ABDOMINAL WALL ABSCESS INCISION AND DRAINAGE N/A 4/26/2019    Procedure: ABDOMINAL WALL DEBRIDEMENT;  Surgeon: Fadi Kern MD;  Location:  [facility];  Service: General   • AMPUTATION DIGIT Left 1/30/2017    Procedure: left fourth and fifth transmetatarsal toe amputation ;  Surgeon: Juancho Martinez MD;  Location:  [facility];  Service:    • BELOW KNEE AMPUTATION Left 3/2/2017    Procedure: AMPUTATION BELOW KNEE, SHMUEL;  Surgeon: Juancho Martinez MD;  Location:  [facility];  Service:    • CYSTOSCOPY N/A 10/16/2019    Procedure: CYSTOSCOPY;  Surgeon: Brad Gutierres MD;  Location:  [facility];  Service: Urology   • CYSTOSCOPY TRANSURETHRAL RESECTION OF PROSTATE N/A 6/18/2020    Procedure: CYSTOSCOPY TRANSURETHRAL RESECTION OF PROSTATIC ABCESS;  Surgeon: Mumtaz Paez MD;  Location:  [facility];  Service: Urology;  Laterality: N/A;   • ENDOSCOPY     • HEMORRHOIDECTOMY N/A 8/2/2019    Procedure: EXCISION AND DRAIN  PERIRECTAL ABSCESS;  Surgeon: Mumtaz Payne MD;  Location:  [facility];  Service: General   • INCISION AND DRAINAGE LEG Right 5/27/2020    Procedure: LOWER EXTREMITY DEBRIDEMENT RIGHT;  Surgeon: Juancho Martinez MD;  Location:  [facility];  Service: General;  Laterality: Right;   • LEG SURGERY     • SIGMOIDOSCOPY N/A 6/11/2020    Procedure: SIGMOIDOSCOPY FLEXIBLE;  Surgeon: Mumtaz Payne MD;  Location:  [facility];  Service: General;  Laterality: N/A;   • TESTICLE SURGERY Right     DEBRIDEMENT FROM GANGRENE   • TONSILLECTOMY  1975         FAMILY HISTORY  Family History   Problem Relation Age of Onset   • Arthritis Mother    • Hypertension Mother    • Kidney disease Mother    • Stroke Mother    • Heart attack Father    • Arthritis Father    • Diabetes Father    • Hyperlipidemia Father    • Hypertension Father    • Mental illness Sister    • Diabetes Brother    • Hypertension Brother    • Obesity Brother          SOCIAL HISTORY  Social History     Socioeconomic History   • Marital status:      Spouse name: Not on file   • Number of children: 1   • Years of education: Not on file   • Highest education level: Not on file   Occupational History   • Occupation: Security     Comment: Disabled-Diabetic Retinopathy   Tobacco Use   • Smoking status: Current Some Day Smoker     Years: 10.00     Types: Cigars   • Smokeless tobacco: Never Used   • Tobacco comment: 1 Cigar Monthly    Substance and Sexual Activity   • Alcohol use: No     Frequency: Never   • Drug use: No   • Sexual activity: Defer   Social History Narrative    Lives in [facility]         ALLERGIES  Patient has no known allergies.        REVIEW OF SYSTEMS  Review of Systems     All systems reviewed and negative except for those discussed in HPI.       PHYSICAL EXAM    I have reviewed the triage vital signs and nursing notes.    ED Triage Vitals [09/03/20 1658]   Temp Heart Rate Resp BP SpO2   97.7 °F (36.5 °C) 93 18 (!) 182/113 97 %      Temp src  Heart Rate Source Patient Position BP Location FiO2 (%)   Infrared Monitor Sitting Right arm --       Physical Exam  GENERAL: Awake alert and oriented, hemodynamically stable, not toxic appearing.  Hypertensive, has not taken his medication today.  Well developed.  Appears in no acute distress.  HENT: Nares patent  EYES: No scleral icterus  CV: Regular rhythm, regular rate  RESPIRATORY: Normal effort.  No audible wheezes, rales or rhonchi  ABDOMEN: Soft, nontender  MUSCULOSKELETAL: Left BKA.  The right lower extremity has 2 chronic stasis wounds that appear to be healing well.  There is no underlying erythema, the margins are pink.  Distally he has good capillary refill and tacked dorsalis pedis pulse..   NEURO: Alert, moves all extremities, follows commands  SKIN: Warm, dry, no rash visualized        LAB RESULTS  Recent Results (from the past 24 hour(s))   Comprehensive Metabolic Panel    Collection Time: 09/03/20  7:42 PM   Result Value Ref Range    Glucose 146 (H) 65 - 99 mg/dL    BUN 15 6 - 20 mg/dL    Creatinine 0.45 (L) 0.76 - 1.27 mg/dL    Sodium 138 136 - 145 mmol/L    Potassium 4.2 3.5 - 5.2 mmol/L    Chloride 104 98 - 107 mmol/L    CO2 25.0 22.0 - 29.0 mmol/L    Calcium 9.3 8.6 - 10.5 mg/dL    Total Protein 7.6 6.0 - 8.5 g/dL    Albumin 3.70 3.50 - 5.20 g/dL    ALT (SGPT) 14 1 - 41 U/L    AST (SGOT) 16 1 - 40 U/L    Alkaline Phosphatase 155 (H) 39 - 117 U/L    Total Bilirubin 0.4 0.0 - 1.2 mg/dL    eGFR Non African Amer >150 >60 mL/min/1.73    Globulin 3.9 gm/dL    A/G Ratio 0.9 g/dL    BUN/Creatinine Ratio 33.3 (H) 7.0 - 25.0    Anion Gap 9.0 5.0 - 15.0 mmol/L   CBC Auto Differential    Collection Time: 09/03/20  7:42 PM   Result Value Ref Range    WBC 9.61 3.40 - 10.80 10*3/mm3    RBC 4.17 4.14 - 5.80 10*6/mm3    Hemoglobin 10.9 (L) 13.0 - 17.7 g/dL    Hematocrit 34.6 (L) 37.5 - 51.0 %    MCV 83.0 79.0 - 97.0 fL    MCH 26.1 (L) 26.6 - 33.0 pg    MCHC 31.5 31.5 - 35.7 g/dL    RDW 16.2 (H) 12.3 - 15.4 %     RDW-SD 49.4 37.0 - 54.0 fl    MPV 11.5 6.0 - 12.0 fL    Platelets 332 140 - 450 10*3/mm3    Neutrophil % 70.0 42.7 - 76.0 %    Lymphocyte % 19.5 (L) 19.6 - 45.3 %    Monocyte % 7.5 5.0 - 12.0 %    Eosinophil % 1.8 0.3 - 6.2 %    Basophil % 0.9 0.0 - 1.5 %    Immature Grans % 0.3 0.0 - 0.5 %    Neutrophils, Absolute 6.73 1.70 - 7.00 10*3/mm3    Lymphocytes, Absolute 1.87 0.70 - 3.10 10*3/mm3    Monocytes, Absolute 0.72 0.10 - 0.90 10*3/mm3    Eosinophils, Absolute 0.17 0.00 - 0.40 10*3/mm3    Basophils, Absolute 0.09 0.00 - 0.20 10*3/mm3    Immature Grans, Absolute 0.03 0.00 - 0.05 10*3/mm3    nRBC 0.0 0.0 - 0.2 /100 WBC   C-reactive Protein    Collection Time: 09/03/20  7:42 PM   Result Value Ref Range    C-Reactive Protein 1.23 (H) 0.00 - 0.50 mg/dL   Sedimentation Rate    Collection Time: 09/03/20  7:42 PM   Result Value Ref Range    Sed Rate 96 (H) 0 - 20 mm/hr   Lactic Acid, Plasma    Collection Time: 09/03/20  7:42 PM   Result Value Ref Range    Lactate 1.2 0.5 - 2.0 mmol/L   Procalcitonin    Collection Time: 09/03/20  7:42 PM   Result Value Ref Range    Procalcitonin 0.09 0.00 - 0.25 ng/mL   Duplex Venous Lower Extremity - Right CAR    Collection Time: 09/03/20 11:26 PM   Result Value Ref Range    BSA 2.3 m^2     CV ECHO MOIRA - BZI_BMI 27.5 kilograms/m^2     CV ECHO MOIRA - BSA(HAYCOCK) 2.3 m^2     CV ECHO MOIRA - BZI_METRIC_WEIGHT 99.8 kg     CV ECHO MOIRA - BZI_METRIC_HEIGHT 190.5 cm    Right Common Femoral Spont Y     Right Common Femoral Phasic Y     Right Common Femoral Augment Y     Right Common Femoral Compress C     Right Saphenofemoral Junction Compress C     Right Proximal Femoral Compress C     Right Mid Femoral Spont Y     Right Mid Femoral Phasic Y     Right Mid Femoral Augment Y     Right Mid Femoral Compress C     Right Distal Femoral Compress C     Right Popliteal Spont Y     Right Popliteal Phasic Y     Right Popliteal Augment Y     Right Popliteal Compress C     Right Posterior Tibial  Compress C     Right Peroneal Compress C     Right GastronemiusSoleal Compress C     Right Greater Saph AK Compress C     Right Greater Saph BK Compress C     Right Lesser Saph Compress C     Left Common Femoral Spont Y     Left Common Femoral Phasic Y     Left Common Femoral Augment Y     Left Common Femoral Competent Y     Left Common Femoral Compress C        Ordered the above labs and independently reviewed the results.        RADIOLOGY  No Radiology Exams Resulted Within Past 24 Hours            PROCEDURES    Procedures      MEDICATIONS GIVEN IN ER    Medications   amLODIPine (NORVASC) tablet 5 mg (5 mg Oral Given 9/3/20 2230)   spironolactone (ALDACTONE) tablet 25 mg (25 mg Oral Given 9/3/20 2232)   terazosin (HYTRIN) capsule 10 mg (10 mg Oral Given 9/3/20 2232)   furosemide (LASIX) tablet 40 mg (40 mg Oral Given 9/3/20 1943)   metoprolol tartrate (LOPRESSOR) tablet 100 mg (100 mg Oral Given 9/3/20 2229)   oxyCODONE-acetaminophen (PERCOCET)  MG per tablet 1 tablet (1 tablet Oral Given 9/3/20 2251)         PROGRESS, DATA ANALYSIS, CONSULTS, AND MEDICAL DECISION MAKING    All labs have been independently reviewed by me.  All radiology studies have been reviewed by me and the radiologist dictating the report.   EKG's have been independently viewed and interpreted by me.      No DVT per CVL tech.   Reviewed with Dr. Martinez.  He states patient can follow-up with him in the office.  States if no obvious signs of cellulitis, no need to start antibiotics.    We will discharged home with instructions to follow-up with PCP tomorrow before heading into the weekend.  Follow-up with Dr. Martinez in the office as directed.  Signs and symptoms of worsening condition reviewed and advised to return if any change or worsening.  They verbalized understanding and is agreeable to plan.               AS OF 23:39 VITALS:    BP - 152/82  HR - 90  TEMP - 97.7 °F (36.5 °C) (Infrared)  O2 SATS - 99%        DIAGNOSIS  Final  diagnoses:   Wound of right lower extremity, initial encounter   Acute on chronic heart failure, unspecified heart failure type (CMS/HCC)   Type 2 diabetes mellitus with other circulatory complication, with long-term current use of insulin (CMS/[facility])   Non-compliance   Right leg pain         DISPOSITION    DISCHARGE    Patient discharged in stable condition.    Reviewed implications of results, diagnosis, meds, responsibility to follow up, warning signs and symptoms of possible worsening, potential complications and reasons to return to ER.    Patient/Family voiced understanding of above instructions.    Discussed plan for discharge, as there is no emergent indication for admission.  Pt/family is agreeable and understands need for follow up and possible repeat testing.  Pt/family is aware that discharge does not mean that nothing is wrong but that it indicates no emergency is currently present that requires admission and they must continue care with follow-up as given below or with a physician of their choice.     FOLLOW-UP  Stanley Carrasco DO  [address] [id number] [phone]    Call   Follow-up tomorrow for general recheck before heading into the weekend.         Medication List      Changed    amLODIPine 10 MG tablet  Commonly known as:  NORVASC  Take 1 tablet by mouth Daily.  What changed:    how much to take  when to take this     aspirin 81 MG tablet  Take 1 tablet by mouth Daily.  What changed:  additional instructions     nystatin 395325 UNIT/GM cream  Commonly known as:  MYCOSTATIN  Apply  topically to the appropriate area as directed 2 (Two) Times a Day.  What changed:  how much to take                 Chang Can PA-C  09/03/20 0325

## 2020-09-04 NOTE — DISCHARGE INSTRUCTIONS
Follow-up with Dr. Martinez for ongoing evaluation and care.  Follow-up with your primary care provider for general recheck before heading into the weekend.  Return to the emergency department immediately for any change or worsening of symptoms.

## 2020-09-08 NOTE — OUTREACH NOTE
Care Coordination Assessment    Documented/Reviewed By:  Laquita Guadarrama RN Date/time:  9/8/2020  4:07 PM   Assessment completed with:  spouse or significant other  Enrolled in care management program:  Yes  Living arrangement:  spouse  Support system:  spouse, home care staff  Home care services:  Yes (Comment: 9-8-20:  [facility])  Equipment used at home:  hospital bed, wheelchair (Comment: Lyndsey Lift)  Communication device:  No  Bed or wheelchair confined:  Yes  Inadequate nutrition:  No  Medication adherence problem:  Yes (Comment: wife stated patient is not good about taking his medications every day like he should)  Experiencing side effects from current medications:  No  Difficulty keeping appointments:  No

## 2020-09-08 NOTE — OUTREACH NOTE
Patient Outreach Note    Called patient/ talked with wife following ED visit 9-3-20 with increased and worsening RLE pain; new ulcerations on RLE.  Explained role of Ambulatory  and contact information given.  Wife stated they saw Dr. Martinez (CT Surgeon who did patient's Left BKA surgery previously) today regarding the Right leg wounds.  Wife stated Dr. Martinez said patient can continue wound care to RLE, or choose to go on and have the surgery, Right leg amputation. Wife stated MD is giving patient a couple days to think it over and call him back with a decision.  Wife states they will be sure and call Dr. Martinez back soon with a decision.  Wife is primary caregiver; states no other family help available.  She did report other assistance patient receives at home:  [facility] Nurse (2-3 x/wk), PT, OT, SW; a Wound Care Nurse from the [facility] comes about 2 x/wk, on days that HH nurse does not; an APRN from [facility] comes about 1x/month; Palliative Care comes about 1x/month- there was mention by wife that the APRN came from Palliative; Medicaid Waiver sends an Aide for 40 hours/week, usually utilized during the nights; Medicaid Transportation assists patient with going to necessary appointments.  Wife reported patient is not good about taking his medications every day; it gets overwhelming for both of them, she said.  Wife voiced awareness that their pharmacy can organize patient's medications for each day and time needed; they can deliver the organized medications to their home; she said she wanted to call them and get this service started to help with patient's medication compliance. RN-ACM advised to call them today to initiate this service.  RN-ACM discussed importance of close follow up with PCP, either by phone, video, or in-person.  Wife stated she would call their office for a f/u appt; MyChart is active.  RN-ACM will follow up in 2 weeks, or as needed.      Laquita Guadarrama RN  Ambulatory     9/8/2020, 16:13

## 2020-09-08 NOTE — PROGRESS NOTES
[facility] Cardiothoracic Surgery Follow-Up Note    Name:  Kendrick Crystal  MRN Number:  [id number]  Date of Encounter:  09/08/2020    Referred By:  No ref. provider found  PCP:  Stanley Carrasco DO    Chief Complaint:    Chief Complaint   Patient presents with   • Leg Pain     Follow-up from ER visit for right leg pain. Patient has new ulcerations on right leg. He also has very little movement in right leg.        History of Present Illness:    Mr. Kendrick Crystal is a pleasant 49 y.o. male with a history of hypertension, hyperlipidemia, poorly controlled diabetes mellitus (HgbA1c 16.6), peripheral vascular disease status post left below the knee amputation, depression and medical non-compliance who presented to the office with a foul smelling necrotic right lower extremity wound and underwent lower extremity debridement 5/27/2020 per Dr. Martinez and returns the office today for follow-up after recent emergency department visit.  The patient was recently seen in the emergency department as he has new ulcerations on his right leg and states that he has very little movement in the right leg.  He denies fever and chills.  But continues to have claudication symptoms.    Review of Systems:  Review of Systems   Constitution: Negative for chills, fever, malaise/fatigue, night sweats and weight loss.   HENT: Negative for hearing loss, odynophagia and sore throat.    Cardiovascular: Positive for claudication and leg swelling. Negative for chest pain, dyspnea on exertion, orthopnea and palpitations.   Respiratory: Positive for cough. Negative for hemoptysis.    Endocrine: Negative for cold intolerance, heat intolerance, polydipsia, polyphagia and polyuria.   Hematologic/Lymphatic: Bruises/bleeds easily.   Skin: Negative for itching and rash.   Musculoskeletal: Positive for back pain and joint pain. Negative for joint swelling and myalgias.   Gastrointestinal: Positive for diarrhea. Negative for abdominal pain,  constipation, hematemesis, hematochezia, melena, nausea and vomiting.   Genitourinary: Positive for bladder incontinence and frequency. Negative for dysuria and hematuria.   Neurological: Negative for focal weakness, headaches, numbness and seizures.   Psychiatric/Behavioral: Positive for depression. Negative for suicidal ideas. The patient is nervous/anxious.    All other systems reviewed and are negative.      Past Medical History:    Past Medical History:   Diagnosis Date   • Abdominal wall cellulitis 5/20/2019   • JUAN (acute kidney injury) (CMS/HCC) 7/29/2018   • Arthritis    • Bipolar 1 disorder (CMS/HCC)    • Cellulitis of right anterior lower leg 07/29/2018    WITH MRSA   • Chronic kidney disease, stage 3 (CMS/HCC)    • Cirrhosis (CMS/HCC)    • Counseling for insulin pump     removed   • Depression    • Diabetes mellitus (CMS/HCC)    • Encephalopathy, hepatic (CMS/HCC)    • H/O degenerative disc disease    • History of Lissa's gangrene     right leg/testicle   • Hyperlipemia    • Hypertension    • MRSA infection    • multiple Skin abscesses    • DUNLAP, bx showed stage IV fibrosis    • Neuropathy    • Peripheral vascular disease (CMS/HCC)    • Thrombophlebitis        Past Surgical History:    Past Surgical History:   Procedure Laterality Date   • ABDOMINAL WALL ABSCESS INCISION AND DRAINAGE N/A 4/26/2019    Procedure: ABDOMINAL WALL DEBRIDEMENT;  Surgeon: Fadi Kern MD;  Location:  [facility];  Service: General   • AMPUTATION DIGIT Left 1/30/2017    Procedure: left fourth and fifth transmetatarsal toe amputation ;  Surgeon: Juancho Martinez MD;  Location:  [facility];  Service:    • BELOW KNEE AMPUTATION Left 3/2/2017    Procedure: AMPUTATION BELOW KNEE, SHMUEL;  Surgeon: Juancho Martinez MD;  Location:  [facility];  Service:    • CYSTOSCOPY N/A 10/16/2019    Procedure: CYSTOSCOPY;  Surgeon: Brad Gutierres MD;  Location:  [facility];  Service: Urology   • CYSTOSCOPY TRANSURETHRAL RESECTION OF PROSTATE N/A  6/18/2020    Procedure: CYSTOSCOPY TRANSURETHRAL RESECTION OF PROSTATIC ABCESS;  Surgeon: Mumtaz Paez MD;  Location:  [facility];  Service: Urology;  Laterality: N/A;   • ENDOSCOPY     • HEMORRHOIDECTOMY N/A 8/2/2019    Procedure: EXCISION AND DRAIN PERIRECTAL ABSCESS;  Surgeon: Mumtaz Payne MD;  Location:  [facility];  Service: General   • INCISION AND DRAINAGE LEG Right 5/27/2020    Procedure: LOWER EXTREMITY DEBRIDEMENT RIGHT;  Surgeon: Juancho Martinez MD;  Location:  [facility];  Service: General;  Laterality: Right;   • LEG SURGERY     • SIGMOIDOSCOPY N/A 6/11/2020    Procedure: SIGMOIDOSCOPY FLEXIBLE;  Surgeon: Mumtaz Payne MD;  Location:  [facility];  Service: General;  Laterality: N/A;   • TESTICLE SURGERY Right     DEBRIDEMENT FROM GANGRENE   • TONSILLECTOMY  1975       Patient Active Problem List   Diagnosis   • Liver cirrhosis secondary to DUNLAP (CMS/[facility])   • Essential hypertension   • Non-compliance   • Hyperlipemia   • Hypertriglyceridemia, essential   • Hypomagnesemia   • Type 2 diabetes mellitus with circulatory disorder and uncontrolled   • History of MRSA infection   • Diabetic ulcer of right lower leg (CMS/[facility])   • S/P BKA (below knee amputation), left (CMS/[facility])   • Peripheral vascular disease (CMS/[facility])   • Abscess of abdominal cavity (CMS/[facility])   • Obesity (BMI 30-39.9)   • Elevated troponin   • Sepsis (CMS/[facility])   • Leukocytosis   • Acute UTI (urinary tract infection)   • Pneumaturia   • Gastroparesis due to secondary diabetes (CMS/[facility])   • History of Clostridioides difficile colitis   • Bipolar disorder (CMS/[facility])   • Type 1 Respiratory Failure   • Diastolic CHF, chronic (CMS/HCC)   • Pleural effusion due to CHF (congestive heart failure) (CMS/[facility])   • Medical non-compliance   • Self neglect   • Fall   • Abscess   • Left thigh pain   • Wound of right leg   • Acute suppurative prostatitis    • Prostate abscess   • Diabetic ketoacidosis without coma associated with type 2 diabetes mellitus  (CMS/[facility])   • Clostridium difficile colitis   • Acute on chronic heart failure (CMS/[facility])     Social History     Tobacco Use   • Smoking status: Current Some Day Smoker     Years: 10.00     Types: Cigars   • Smokeless tobacco: Never Used   • Tobacco comment: 4 cigars a year   Substance Use Topics   • Alcohol use: No     Frequency: Never   • Drug use: No     Family History   Problem Relation Age of Onset   • Arthritis Mother    • Hypertension Mother    • Kidney disease Mother    • Stroke Mother    • Heart attack Father    • Arthritis Father    • Diabetes Father    • Hyperlipidemia Father    • Hypertension Father    • Mental illness Sister    • Diabetes Brother    • Hypertension Brother    • Obesity Brother        Medications:      Current Outpatient Medications:   •  amLODIPine (NORVASC) 10 MG tablet, Take 1 tablet by mouth Daily. (Patient taking differently: Take 5 mg by mouth Daily.), Disp: 90 tablet, Rfl: 3  •  aspirin 81 MG tablet, Take 1 tablet by mouth Daily. (Patient taking differently: Take 81 mg by mouth Daily. Continues per Dr. Martinez's orders), Disp: 30 tablet, Rfl: 11  •  DULoxetine (CYMBALTA) 60 MG capsule, Take 1 capsule by mouth Daily., Disp: 90 capsule, Rfl: 3  •  furosemide (LASIX) 40 MG tablet, Take 1 tablet by mouth Daily., Disp: 30 tablet, Rfl: 0  •  Hydrocortisone, Perianal, (ANUSOL-HC) 2.5 % rectal cream, Insert  into the rectum 2 (Two) Times a Day., Disp: , Rfl:   •  Insulin Glargine (LANTUS SOLOSTAR) 100 UNIT/ML injection pen, Inject 20 Units under the skin into the appropriate area as directed Every 12 (Twelve) Hours., Disp: 21 mL, Rfl: 0  •  Insulin Lispro, 1 Unit Dial, (HUMALOG) 100 UNIT/ML solution pen-injector, Inject 4 Units under the skin into the appropriate area as directed 3 (Three) Times a Day With Meals., Disp: 6 mL, Rfl: 5  •  lidocaine (LIDODERM) 5 %, Place 1 patch on the skin as directed by provider Daily. Remove & Discard patch within 12 hours or as directed by MD, Disp: , Rfl:  "  •  melatonin 5 MG tablet tablet, Take 1 tablet by mouth At Night As Needed (sleep)., Disp: , Rfl:   •  metoprolol tartrate (LOPRESSOR) 100 MG tablet, Take 1 tablet by mouth Every 12 (Twelve) Hours., Disp: 180 tablet, Rfl: 3  •  Misc. Devices (MATTRESS PAD) misc, Use 1 gel mattress pad as directed on packaging, Disp: 1 each, Rfl: 0  •  nystatin (MYCOSTATIN) 037913 UNIT/GM cream, Apply  topically to the appropriate area as directed 2 (Two) Times a Day. (Patient taking differently: Apply 1 application topically to the appropriate area as directed 2 (Two) Times a Day.), Disp: 90 g, Rfl: 3  •  oxyCODONE-acetaminophen (PERCOCET)  MG per tablet, Take 1 tablet by mouth 2 (Two) Times a Day., Disp: 4 tablet, Rfl: 0  •  QUEtiapine (SEROquel) 25 MG tablet, Take 0.5 tablets by mouth Every Night., Disp: 30 tablet, Rfl: 0  •  saccharomyces boulardii (FLORASTOR) 250 MG capsule, Take 1 capsule by mouth 2 (Two) Times a Day., Disp: , Rfl:   •  spironolactone (ALDACTONE) 25 MG tablet, Take 1 tablet by mouth Daily., Disp: , Rfl:   •  terazosin (HYTRIN) 10 MG capsule, Take 1 capsule by mouth Every Night., Disp: 30 capsule, Rfl: 1  No current facility-administered medications for this visit.     Facility-Administered Medications Ordered in Other Visits:   •  Chlorhexidine Gluconate Cloth 2 % pads 1 application, 1 application, Topical, Q12H PRN, Jewels Infante APRN    Allergies:  No Known Allergies    Physical Exam:  Vital Signs:    Vitals:    09/08/20 1210   BP: 164/99   BP Location: Right arm   Patient Position: Sitting   Pulse: 91   Temp: 97.8 °F (36.6 °C)   SpO2: 99%   Weight: 104 kg (230 lb)   Height: 190.5 cm (75\")       Physical Exam  Gen- Flat affect  CV- Regular rate and rhythm, no murmur, gallop or rub  Pulm- Clear to auscultation bilateral without wheeze or rhonchi  GI- Soft, normoactive bowel sounds, non-tender  Ext- LLE amputation, RLE with 2+ pitting edema  Neuro- CN II- XII grossly intact, tongue midline, voice " normal.    Labs/Imaging:  Duplex scan of lower extremity arteries, [facility], 9/3/20  Impression:  · Normal right lower extremity venous duplex scan.    Assessment / Plan:  Mr. Kendrick Crystal is a pleasant 49 y.o. male with a history of hypertension, hyperlipidemia, poorly controlled diabetes mellitus (HgbA1c 16.6), peripheral vascular disease status post left below the knee amputation, depression and medical non-compliance who presented to the office with a foul smelling necrotic right lower extremity wound and underwent lower extremity debridement 5/27/2020 per Dr. Martinez and returns the office today for follow-up after recent emergency department visit.  The patient was recently seen in the emergency department as he has new ulcerations on his right leg and states that he has very little movement in the right leg.  He denies fever and chills.  He states that he is unable to weight-bear due to the pain.  He has multiple ulcers on his right lower extremity on his shin, right outer ankle and on the bottom of his foot.  The patient was evaluated by Dr. Martinez who felt that he would benefit from an amputation as they have not been able to improve blood flow enough to help with these various ulcers.  This was discussed with the patient in the office today.  The other option would be just to continue with his current wound care therapy.  From a revascularization standpoint, there is not much to offer.  The patient will return to the clinic in 3 months for reevaluation.  I have encouraged him to continue following with home health and infectious disease.    Please note, this document was produced using voice recognition software.    CHINTAN Grande  [facility] Cardiothoracic Surgery\

## 2020-09-15 NOTE — ED PROVIDER NOTES
Subjective   Mr. Crystal is a 50-year-old male that has multiple chronic medical disease problems.  He states that he woke up having some increased shortness of breath.  He is chronically on 4 L per nasal cannula states he had increased to 5 L.  He had no fevers no chills.  States he is more comfortable sitting up than lying down.  He states that the he has a history of CHF in the past.  He is been taking his medications as written.  He states he had maybe a little bit more pedal edema in the right lower extremity with a attributed this to the infection that he has been having.  Said no fevers no chills no loss of taste or smell.  Had no known exposure to COVID.      History provided by:  Patient and relative   used: No    Shortness of Breath  Severity:  Moderate  Onset quality:  Gradual  Duration:  2 days  Timing:  Constant  Progression:  Worsening  Chronicity:  Chronic  Context: activity    Context: not fumes, not known allergens, not occupational exposure, not pollens, not URI and not weather changes    Relieved by:  Sitting up  Exacerbated by: Lying flat.  Ineffective treatments:  None tried  Associated symptoms: no abdominal pain, no chest pain, no claudication, no cough, no diaphoresis, no headaches, no hemoptysis, no neck pain, no PND, no rash, no sputum production, no swollen glands, no vomiting and no wheezing    Risk factors: no family hx of DVT, no hx of PE/DVT, no obesity, no recent surgery and no tobacco use        Review of Systems   Constitutional: Negative for diaphoresis.   Respiratory: Positive for shortness of breath. Negative for cough, hemoptysis, sputum production and wheezing.    Cardiovascular: Negative for chest pain, palpitations, claudication, leg swelling and PND.   Gastrointestinal: Negative for abdominal pain, diarrhea, nausea and vomiting.   Genitourinary: Negative for dysuria, frequency and urgency.   Musculoskeletal: Negative for back pain and neck pain.   Skin:  Negative for rash.   Neurological: Negative for headaches.   Psychiatric/Behavioral: Negative.    All other systems reviewed and are negative.      Past Medical History:   Diagnosis Date   • Abdominal wall cellulitis 5/20/2019   • JUAN (acute kidney injury) (CMS/HCC) 7/29/2018   • Arthritis    • Bipolar 1 disorder (CMS/HCC)    • Cellulitis of right anterior lower leg 07/29/2018    WITH MRSA   • Chronic kidney disease, stage 3 (CMS/HCC)    • Cirrhosis (CMS/HCC)    • Counseling for insulin pump     removed   • Depression    • Diabetes mellitus (CMS/HCC)    • Encephalopathy, hepatic (CMS/HCC)    • H/O degenerative disc disease    • History of Lissa's gangrene     right leg/testicle   • Hyperlipemia    • Hypertension    • MRSA infection    • multiple Skin abscesses    • DUNLAP, bx showed stage IV fibrosis    • Neuropathy    • Peripheral vascular disease (CMS/HCC)    • Thrombophlebitis        No Known Allergies    Past Surgical History:   Procedure Laterality Date   • ABDOMINAL WALL ABSCESS INCISION AND DRAINAGE N/A 4/26/2019    Procedure: ABDOMINAL WALL DEBRIDEMENT;  Surgeon: Fadi Kern MD;  Location:  [facility];  Service: General   • AMPUTATION DIGIT Left 1/30/2017    Procedure: left fourth and fifth transmetatarsal toe amputation ;  Surgeon: Juancho Martinez MD;  Location:  [facility];  Service:    • BELOW KNEE AMPUTATION Left 3/2/2017    Procedure: AMPUTATION BELOW KNEE, SHMUEL;  Surgeon: Juancho Martinez MD;  Location:  [facility];  Service:    • CYSTOSCOPY N/A 10/16/2019    Procedure: CYSTOSCOPY;  Surgeon: Brad Gutierres MD;  Location:  [facility];  Service: Urology   • CYSTOSCOPY TRANSURETHRAL RESECTION OF PROSTATE N/A 6/18/2020    Procedure: CYSTOSCOPY TRANSURETHRAL RESECTION OF PROSTATIC ABCESS;  Surgeon: Mumtaz Paez MD;  Location:  [facility];  Service: Urology;  Laterality: N/A;   • ENDOSCOPY     • HEMORRHOIDECTOMY N/A 8/2/2019    Procedure: EXCISION AND DRAIN PERIRECTAL ABSCESS;  Surgeon: Mumtaz Payne  MD;  Location:  [facility];  Service: General   • INCISION AND DRAINAGE LEG Right 5/27/2020    Procedure: LOWER EXTREMITY DEBRIDEMENT RIGHT;  Surgeon: Juancho Martinez MD;  Location:  [facility];  Service: General;  Laterality: Right;   • LEG SURGERY     • SIGMOIDOSCOPY N/A 6/11/2020    Procedure: SIGMOIDOSCOPY FLEXIBLE;  Surgeon: Mumtaz Payne MD;  Location:  [facility];  Service: General;  Laterality: N/A;   • TESTICLE SURGERY Right     DEBRIDEMENT FROM GANGRENE   • TONSILLECTOMY  1975       Family History   Problem Relation Age of Onset   • Arthritis Mother    • Hypertension Mother    • Kidney disease Mother    • Stroke Mother    • Heart attack Father    • Arthritis Father    • Diabetes Father    • Hyperlipidemia Father    • Hypertension Father    • Mental illness Sister    • Diabetes Brother    • Hypertension Brother    • Obesity Brother        Social History     Socioeconomic History   • Marital status:      Spouse name: Not on file   • Number of children: 1   • Years of education: Not on file   • Highest education level: Not on file   Occupational History   • Occupation: Security     Comment: Disabled-Diabetic Retinopathy   Tobacco Use   • Smoking status: Current Some Day Smoker     Years: 10.00     Types: Cigars   • Smokeless tobacco: Never Used   • Tobacco comment: 4 cigars a year   Substance and Sexual Activity   • Alcohol use: No     Frequency: Never   • Drug use: No   • Sexual activity: Defer   Social History Narrative    Lives in [place] [place] [facility] with spouse. Has caregiver daily           Objective   Physical Exam  Vitals signs and nursing note reviewed.   Constitutional:       Appearance: He is well-developed.      Comments: Warm dry nontoxic actually does not have his oxygen is knows his O2 sats are about 94%.   HENT:      Head: Normocephalic and atraumatic.      Right Ear: External ear normal.      Left Ear: External ear normal.      Nose: Nose normal.   Eyes:      General: No scleral  icterus.     Conjunctiva/sclera: Conjunctivae normal.      Pupils: Pupils are equal, round, and reactive to light.   Neck:      Musculoskeletal: Normal range of motion.      Thyroid: No thyromegaly.   Cardiovascular:      Rate and Rhythm: Regular rhythm. Tachycardia present.      Heart sounds: Normal heart sounds.   Pulmonary:      Effort: Pulmonary effort is normal. No respiratory distress.      Breath sounds: Decreased breath sounds present. No wheezing or rales.   Chest:      Chest wall: No tenderness.   Abdominal:      General: Bowel sounds are normal. There is no distension.      Palpations: Abdomen is soft.      Tenderness: There is no abdominal tenderness.   Musculoskeletal: Normal range of motion.   Lymphadenopathy:      Cervical: No cervical adenopathy.   Skin:     General: Skin is warm and dry.      Capillary Refill: Capillary refill takes less than 2 seconds.   Neurological:      Mental Status: He is alert and oriented to person, place, and time.      Cranial Nerves: No cranial nerve deficit.      Coordination: Coordination normal.      Deep Tendon Reflexes: Reflexes are normal and symmetric. Reflexes normal.   Psychiatric:         Behavior: Behavior normal.         Thought Content: Thought content normal.         Judgment: Judgment normal.         Procedures           ED Course           No results found for this or any previous visit (from the past 24 hour(s)).  Note: In addition to lab results from this visit, the labs listed above may include labs taken at another facility or during a different encounter within the last 24 hours. Please correlate lab times with ED admission and discharge times for further clarification of the services performed during this visit.    XR Chest 1 View   Final Result   Heart is enlarged with increased pulmonary vascularity   bilaterally. Small left pleural effusions. Mild increased markings at   the lung bases.       D:  09/15/2020   E:  09/15/2020       This report was  finalized on 9/16/2020 9:20 AM by Dr. Nella Enriquez MD.            Vitals:    09/15/20 0930 09/15/20 0959 09/15/20 1113 09/15/20 1328   BP: 153/88 153/88  148/86   Pulse: 100 98 101 98   Resp:    22   Temp:    98 °F (36.7 °C)   TempSrc:    Oral   SpO2: 95%  96% 96%   Weight:       Height:         Medications   furosemide (LASIX) injection 80 mg (80 mg Intravenous Given 9/15/20 0959)     ECG/EMG Results (last 24 hours)     Procedure Component Value Units Date/Time    ECG 12 Lead [[id number]] Collected: 09/15/20 0826     Updated: 09/15/20 0842        ECG 12 Lead   Final Result   Test Reason : SOA Protocol   Blood Pressure : **/** mmHG   Vent. Rate : 101 BPM     Atrial Rate : 101 BPM      P-R Int : 166 ms          QRS Dur : 108 ms       QT Int : 358 ms       P-R-T Axes : 073 081 072 degrees      QTc Int : 464 ms      Sinus tachycardia   Incomplete left bundle branch block   Borderline ECG   When compared with ECG of 07-JUL-2020 20:09,   T wave amplitude has decreased in Lateral leads   Confirmed by MD Ashleigh, Usman ([id number]) on 9/15/2020 4:09:18 PM      Referred By:  EDMD           Confirmed By:Usman Sotelo MD          Discussed the findings with the Mr. Crystal.  He does not meet any need for admission at this point.  He like to be discharged home he is calling for transport.                                MDM  Number of Diagnoses or Management Options  Acute on chronic congestive heart failure, unspecified heart failure type (CMS/HCC): new and requires workup     Amount and/or Complexity of Data Reviewed  Clinical lab tests: reviewed and ordered  Tests in the radiology section of CPT®: reviewed and ordered  Tests in the medicine section of CPT®: reviewed and ordered  Decide to obtain previous medical records or to obtain history from someone other than the patient: yes  Discuss the patient with other providers: yes    Patient Progress  Patient progress: stable      Final diagnoses:   Acute on chronic congestive  heart failure, unspecified heart failure type (CMS/[facility])            Juancho Mendoza PA  09/16/20 4958

## 2020-09-17 NOTE — TELEPHONE ENCOUNTER
SVETLANA MADRID NURSE CALLED FROM [facility] SAID PATIENT HAS DECLINED PHYSICAL THERAPY AND  IS RECOMMENDING MENTAL HEALTH EVALUATION FOR PATIENT    SVETLANA: [phone]

## 2020-09-17 NOTE — OUTREACH NOTE
Patient Outreach Note    Called patient, wife answered and talked, in follow up for HRCM and ED visit 9-15-20 with increased shortness of breath/ A/C CHF.  Wife reported patient's shortness of breath is a little better now; he is now wearing an oxygen mask instead of nasal cannula; his oxygen saturations are staying in the 90's. She said he is okay sitting up, but has difficulty lying flat.  Wife stated patient is miserable, and voiced being tired of feeling so bad. She said patient is bedridden; only gets up to a W/C via a Lyndsey Lift; is incontinent of B & B; he declines taking all medications; he declines blood sugar monitoring.  She said he is still eating and drinking water well.  Wife stated she is grateful for the medicaid waiver help that comes 40 hours/week, and the Home Health Nurse and , and the Wound Nurse.  She reported patient told Home PT and OT to stop coming.  Asked wife if she contacted their pharmacy about organizing patient's medications so it is easier for her and patient each day.  She said she has not had time,  and patient is refusing his medications anyway. Discussed with wife importance of keeping his skin clean and dry, of patient changing position in bed, to prevent skin breakdown.  She voiced understanding.  Asked wife if she has communicated with PCP how patient is doing, or made a follow up video/telephone appt.  She said she had not. Discussed importance of close PCP f/u.  Wife voiced her own exhaustion with 24/7 care of patient; said no other family members give assistance to them.  Talked with wife about caregiver exhaustion, and asked if she would be interested in talking with a counselor type person.  She said she would be open to it, but has no extra money to pay for it.  Talked with wife about options of extra help in the home with patient's care, ie: sitters/companions, paid on hourly rate.  She said they have no extra money to pay for this.  Encouraged wife to talk  with the Home Health SW when she comes in to the home; ask her about local community resources that could help them.  Wife said she would do this.    This note will be routed to PCP.    Laquita Guadarrama RN  Ambulatory     9/17/2020, 12:13 EDT

## 2020-09-17 NOTE — TELEPHONE ENCOUNTER
REQUESTING VERBAL ORDERS FOR 3 SOCIAL WORK VISITS AND WE CAN LEAVE A MESSAGE AS IT IS A SECURED LINE.     EVAL WAS DONE YESTERDAY AND HE IS HAVING SUCIDAL THOUGHTS. DENIED ANY PLAN TO HARM HIMSELF. THE FACILITY WAS ADVISED TO TAKE TO ER IF ANY ISSUES ARISES. HAS A BIPOLAR DIAGNOSIS. WOULD LIKE TO DISCUSS CHANGE OF MEDS AS WELL.

## 2020-09-22 NOTE — OUTREACH NOTE
Patient Outreach Note    Called patient in follow up for HRCM, his wife answered the phone.  She said she was in the middle of something at this time, and asked if RN-ACM could call back another time.  Will follow up another day,  as requested.      Laquita Guadarrama RN  Ambulatory     9/22/2020, 15:51 EDT

## 2020-09-28 PROBLEM — E66.9 OBESITY (BMI 30-39.9): Status: RESOLVED | Noted: 2019-05-20 | Resolved: 2020-01-01

## 2020-09-28 PROBLEM — A41.9 ACUTE SEPSIS (HCC): Status: ACTIVE | Noted: 2020-01-01

## 2020-09-28 PROBLEM — R41.82 AMS (ALTERED MENTAL STATUS): Status: ACTIVE | Noted: 2020-01-01

## 2020-10-01 NOTE — PROGRESS NOTES
[facility] Progress Note        Antibiotics:  Anti-Infectives (From admission, onward)    Ordered     Dose/Rate Route Frequency Start Stop    20 1633  micafungin 100 mg/100 mL 0.9% NS IVPB (mbp)     Ordering Provider: Usman Dong MD    100 mg  over 60 Minutes Intravenous Every 24 Hours 20 1800 10/05/20 1759    20 0531  meropenem (MERREM) 1 g/100 mL 0.9% NS VTB (mbp)     Ordering Provider: Sridevi Xavier PA    1 g  over 3 Hours Intravenous Every 8 Hours Scheduled 20 0800 10/05/20 0759    20 0531  vancomycin oral solution reconstituted 125 mg     Ordering Provider: Sridevi Xavier PA    125 mg Oral Every 6 Hours Scheduled 20 0600 10/05/20 0559          CC:    HPI:  Patient is a 50 y.o.  Yr old male with history of poorly contolled T2DM, DUNLAP/liver cirrhosis, HTN, PVD/Left BKA, and multiple skin abscesses/MRSA who presented to [facility] ED with right shin wound infection summer 2018.  He was unable to get to see Dr. Martinez as his father passed away unexpectedly on 18 and he had to wait until after the . The wound worsened becoming gangrenous and he came to [facility] ED in 2018.  He also had been hospitalized at [facility] and then transferred to  for a severe penis/scrotum infection requiring surgical debridement in summer 2018.  He received antibiotics regarding his right leg infection, generally improved over the remainder of summer 2018 but that area had not completely healed. He was readmitted 2019 with acute left lower abdominal wall redness/swelling and pain, spontaneous drainage associated with fever/chills and uncontrolled blood sugars.   I&D requiring wide debridement , severe/deep infection and transferred to [facility] on May 3, 2019 with IV Zosyn/oral doxycycline. Cultures had lactobacillus and mixed gram-positive skin sherly including alpha strep, staph epidermidis and corynebacterium. Readmitted to [facility] May 18, 2019, increasing generalized edema ultimately transitioned to oral doxycycline and healed.     Readmitted July 30, 2019 with acute rectal pain that had begun 7/27; this is associated with constipation for days, chills and nausea/dry heaving.  White blood cell count elevated, high hemoglobin A1c over 13, urinalysis with hematuria/pyuria and CT scan with 3 x 3 cm fluid collection anterior to the distal rectum and per discussion with Dr. Akbar/Jennifer, this was not in the prostate.  Colorectal surgery/urology saw him for consideration of surgical options/timing/threshold, etc..     8/2/19 I&Dper Dr Payne perirectal abscess; patient reports that he had instrumented his rectum prior to hospitalization with enema     8/3/19 urinary retention overnight requiring in/out catheterization per patient.  Creat climb     8/4/19 further creat climb; childs placed and lisinopril stopped and d/w Dr Rubio;  ?obstruction initially associated with retention postop (1200 out with I/O cath postop per nursing) -v- contrast from CT -v- lisinopril/medication/vancomycin -v- relative hypotension -v- likely combination of factors with ATN; nephrology following; vancomycin trough high and vancomycin stopped empirically 8/3 although high trough potentially accumulation as a consequence of diminishing renal function associated with retention/contrast/pressure rather than cause.  Nonetheless, opted to avoid at that time; creatinine trending down.      8/7/19 in retrospect he remembers air in his urine at admit which has raised concern for possible fistula from urinary tract;  No fecaluria and culture from abscess is fecal sherly;  ?rectal-urethral fistula;  Dr Payne aware     Culture with E. coli/Klebsiella species and creatinine generally trended down, transitioned to oral antibiotics at discharge.     Readmitted August 17, 2019 with nausea/vomiting/dry heaves for 3 days.  Childs catheter was placed and CT scan of the abdomen showed:  "     \"Previously seen fluid collection identified inferior to the prostate gland is more increased in density on today's examination. There is wall thickening again seen throughout the bladder. Findings again are concerning for cystitis.\" per radiology     He was transitioned to oral omnicef/topical antifungal on approx 8/23/19; Noncompliant with f/u in our office     READMITTED 10/8/19 RLQ abd pain, urinary retention, nausea, dysuria and generalized fatigue     UTI by U/A, subsequent blood cultures positive for  kleb sp, urine with kleb and e coli ;  Abnormal CT abd with right perinephric fluid collection, advanced cirrhosis, urinary bladder thickening.  10/9 Aspirate of perinephric fluid collection consistent with abscess/kleb and white blood cells; 10/16/19 cytoscopy with bullous change per Dr Gutierres but no fistula per him; 10/19/19 intermittent nausea, no vomiting or dry heaves this am, intermittent dry cough broadened to zosyn with ?aspiration pneumonia evolving after dry heaves/vomiting and had intermittent diarrhea, oral vancomycin added empirically with history of prior C. Difficile; improved with IV Zosyn/oral vancomycin and he refused consideration of placement.  He insisted on home, discharged October 23 and noncompliant with outpatient therapy and outpatient follow-up October 25.  He had become fatigued such that his family could not assist him out of bed and it was recommended by my office that he return to the emergency room October 25.  He apparently refused that but did come to the emergency room October 26.     READMITTED October 26, 2019 with generalized weakness, fatigue/malaise and nausea/vomiting/diarrhea.  CT scan revealed increased size of perinephric fluid collection per radiology. 10/29 drain placed; 11/5/19 drain inadvertently out overnight per him;11/8 voiding cystourethrogram per urology; I d/w Dr Escobedo 11/11 and he reviewed the US from 11/8 and he reports to me US is adequate for " "followup on this and NO current evidence for abscess, no need for CT scan at that time to evaluate abscess per d/w him and given clinical improvements/radiographic improvements; finished abx prior to discharge 11/14 and noncompliant with physical therapy, he refused placement and insisted on home.     READMITTED 11/18/19 with multifactorial complaints.  He reported generalized fatigue and inability to move himself around, increased dyspnea with dry cough and dry heaves, urinary frequency/retention and small amounts of urination; noted to have leukocytosis, elevated troponin/BNP, with hematuria/pyuria and bacteriuria/small yeast; chest x-ray with increased pulmonary vascularity bilaterally and ill-defined opacification left lung base with small left pleural effusion per radiology.  CT abdomen with large pleural effusions with dependent airspace disease, likely atelectasis per radiology and limited ability to evaluate for right sided perinephric abscess per radiology     11/22 CT chest no focal consolidation per radiology; 11/23/19 JUAN after diuresis; later in November he was discharged with oral antibiotics but noncompliant with follow-up.     Readmitted June 7, 2020 with inability to urinate, dysuria and nausea/dry heaves with subjective fever.  Had severe leukocytosis with acute pyuria/hematuria concerning for UTI and subsequent urinary culture with gram-negative lon.  CT scan of the abdomen/pelvis with concern for acute suppurative prostatitis and possible prostatic abscess per radiology in addition to soft tissue thickening extending to the left lower pelvic sidewall.  No evidence for urinary tract obstruction with indwelling Walker catheter.  No specific mention of perinephric collection and no specific focal bowel abnormality described per radiology     He had inability to urinate with hesitancy and dysuria.       6/11 flex sig by Dr Payne     6/18/20 surgery by Dr Paez:  \"PROCEDURE:  Cystoscopy with transurethral " "resection of prostatic abscess and prostate obtaining tissue culture and permanent pathology.\"     Readmitted July 7, 2020; Per Dr Garza Consult: Prolonged hospital course with numerous complications.  He was followed by Dr. Dong during most of the hospital stay.  He had a prostate abscess which was resected on 6/18.  Culture is polymicrobial with Klebsiella, Candida glabrata and lactobacillus.  He was treated with IV Unasyn, Flagyl, micafungin, also oral vancomycin due to history of C. Difficile.  Eventually he was deemed appropriate for transfer to skilled nursing facility but patient refused placement.  Medicare would no longer pay for his hospital admission so patient requested discharge home.  He left the hospital on 7/3 on oral antibiotics: Augmentin and oral vancomycin, with plans to discuss transition to hospice as an outpatient given his overall poor prognosis and reluctance to comply with recommendations.  He was admitted to [facility] on 7/7 where a CTA of the chest was negative for PE but had severe  bilateral atelectasis and bilateral pleural effusions.  CT of the abdomen was concerning for multiloculated pelvic abscesses and possibly splenic microabscesses. Received polymicrobial coverage, moved out of ICU with pigtail drain in place regarding pleural effusion and eventually had repeat imaging. 7/16 CT with no abscess per radiology; 7/23/20  Noncompliance continues, refusing placement, refusing oral vancomycin and general noncompliant behavior; he eventually was tapered to ceftriaxone/Mycamine and oral vancomycin solution, transitioned to facility.  In August he was D escalated to oral Omnicef/vancomycin solution and seen August 17 with general clinical improvements.  Noncompliant with 2 subsequent outpatient visits.     Readmitted to [facility] on September 28, 2020; he had reported feeling fatigued and generally not feeling well, reports by family  Of confusion " although at the time of my consultation he is oriented to person/place/year,, family and situation.  He has had urinary frequency with dysuria, diarrhea intermittently although he is unable to quantify, and subjective fever.  He is placed on empiric antimicrobials. Noted to have bilateral pleural effusions with pulmonary edema,  Hematuria/pyuria with concern for recurrent UTI     10/1/20  Seen early and sleepy; nursing reports noncompliance;  No diarrhea. less dysuria/frequency and occasional incontinence.  No flank pain. He has been intermittently noncompliant with nursing staff  .  He is on nasal cannula oxygen but denies cough or hemoptysis.  No rash.  No vomiting.  No abdominal pain.  He has chronic wounds at the right lower leg but no new redness/swelling there and no new purulent drainage/malodor.no headache photophobia or neck stiffness.       No other particular exposures    ROS:      10/1/20 No f/c/s. No n/v/d. No rash. No new ADR to Abx.     No rash. No new ADR to Abx.      Constitutional-- No chills or sweats.  Appetite diminished with fatigue   Heent-- No new vision, hearing or throat complaints.  No epistaxis or oral sores.  Denies odynophagia or dysphagia.  No flashers, floaters or eye pain. No odynophagia or dysphagia. No headache, photophobia or neck stiffness.  CV-- No chest pain, palpitation or syncope  Resp-- as above  GI-  No hematochezia, melena, or hematemesis. Denies jaundice or chronic liver disease.  --as above  Lymph- no swollen lymph nodes in neck/axilla or groin.   Heme- No active bruising or bleeding; no Hx of DVT or PE.  MS-- no swelling or pain in the bones or joints of arms/legs aside from above.  No new back pain.  Neuro-- No acute focal weakness or numbness in the arms or legs.  No seizures.     Full 12 point review of systems reviewed and negative otherwise for acute complaints, except for above       PE:   /73 (BP Location: Right arm, Patient Position: Lying)   Pulse 64   " Temp 98.1 °F (36.7 °C) (Oral)   Resp 16   Ht 190.5 cm (75\")   Wt 99.8 kg (220 lb)   SpO2 96%   BMI 27.50 kg/m²     GENERAL: sleepy,  in no acute distress.  Chronically ill  HEENT: Normocephalic, atraumatic.  PERRL. EOMI. No conjunctival injection. No icterus. Oropharynx clear without evidence of thrush or exudate. No evidence of peridontal disease.    NECK: Supple without nuchal rigidity. No mass.  LYMPH: No cervical, axillary or inguinal lymphadenopathy.  HEART: RRR; No murmur, rubs, gallops.   LUNGS: Diminished at bases to auscultation bilaterally without wheezing, rales, rhonchi. Normal respiratory effort. Nonlabored. No dullness.  ABDOMEN: Soft, non tender, nondistended. Positive bowel sounds. No rebound or guarding. NO mass or HSM.   vague erythema and satellite lesions in the skin fold consistent with cutaneous candidiasis  EXT:  No cyanosis, clubbing or edema. No cord.  MSK: FROM without joint effusions noted arms/legs.    SKIN: Warm and dry without cutaneous eruptions on Inspection/palpation.    NEURO: sleepy     No peripheral stigmata/phenomena of endocarditis     Right lower leg with clean wounds through the dermis to the soft tissue but no probe to bone tendon joint or ligament.  No purulence and no surrounding redness induration or warmth.  No mass bulge or fluctuance.     Left amputation site with no open wound or active drainage.  No redness induration or warmth.  No mass bulge or fluctuance.  No crepitus or bulla.       Laboratory Data    Results from last 7 days   Lab Units 09/29/20  0442 09/28/20  0238   WBC 10*3/mm3 8.24 10.89*   HEMOGLOBIN g/dL 8.8* 9.1*   HEMATOCRIT % 29.7* 29.7*   PLATELETS 10*3/mm3 280 331     Results from last 7 days   Lab Units 10/01/20  0550   SODIUM mmol/L 137   POTASSIUM mmol/L 5.5*   CHLORIDE mmol/L 103   CO2 mmol/L 25.0   BUN mg/dL 42*   CREATININE mg/dL 1.04   GLUCOSE mg/dL 126*   CALCIUM mg/dL 8.7     Results from last 7 days   Lab Units 09/28/20  0238   ALK " PHOS U/L 222*   BILIRUBIN mg/dL 0.3   ALT (SGPT) U/L 13   AST (SGOT) U/L 16               Estimated Creatinine Clearance: 117.3 mL/min (by C-G formula based on SCr of 1.04 mg/dL).      Microbiology:      Radiology:  Imaging Results (Last 72 Hours)     ** No results found for the last 72 hours. **            Impression:     --acute pyuria/hematuria with symptomatology concerning for recurrent UTI.   Empiric antimicrobials ongoing.  Further adjustments to depend on clinical course a study results and response to therapy; culture mixed     --acute confusion/encephalopathy.  Better by the time of my consultation and oriented.  Further neurologic workup at discretion of internal medicine including subspecialty evaluation with neurology as they see fit.  No meningismus.  Present     --Bilateral pleural effusion/pulmonary edema per admission note.  Pneumonia less likely at present      --cutaneous candidiasis.  Mycamine added     --History multiple pelvic abscesses near prostate in addition to possible splenic microabscesses versus infarct by prior outside hospital CT scan July 2020; prior partial prostatectomy  With concern for chronic prostatitis given multiple past admissions with Klebsiella. repeat CT scan July 16, 2020 with no abscess per radiology.  Lengthy antimicrobial therapy between July/August and CT scan in September 2020 of the description of abscess by radiology. Monitor for potential relapse     --History C. Difficile with intermittent diarrhea although C. Difficile PCR negative most recently.  Continue oral vancomycin solution for now with high risk for relapse     --History MRSA previously; leg stable with no active soft tissue infection at present.     --history left BKA.     --Peripheral vascular disease     --Diabetes.  Previously uncontrolled     --Medical noncompliance     --possible cirrhosis per imaging per radiology.  Further workup or referral at discretion of medicine team    PLAN:     --Merrem  I.V./Mycamine IV     --Vancomycin oral solution     --History per family and nursing staff     --Highly complex set of issues with high risk for further serious morbidity and other serious sequela     --Discussed with microbiology     --Check/review labs cultures and scans     --his medical noncompliance remains a significant barrier to care.  Family aware      --COVID negative from 9/29    --d/w Dr Caro Dong MD  10/1/2020

## 2020-10-01 NOTE — PLAN OF CARE
Goal Outcome Evaluation:         VSS, childs in place, pt refuses to take PO antibiotics, rested thoughout the night, will continue to monitor for changes.

## 2020-10-01 NOTE — PROGRESS NOTES
[facility] Medicine Services  PROGRESS NOTE    Patient Name: Kendrick Crystal  : 1970  MRN: [id number]    Date of Admission: 2020  Primary Care Physician: Stanley Carrasco DO    Subjective   Subjective     CC: f/u sepsis    HPI: Up in bed. Says he is feeling poorly today, a little more nauseated.     Review of Systems  Gen- No fevers, chills  CV- No chest pain, palpitations  Resp- No cough, dyspnea  GI- No vomiting/diarrhea, abd pain    Objective   Objective     Vital Signs:   Temp:  [97.8 °F (36.6 °C)-98.2 °F (36.8 °C)] 98.2 °F (36.8 °C)  Heart Rate:  [64-68] 64  Resp:  [16-18] 18  BP: (110-135)/(65-99) 135/99        Physical Exam:  Constitutional: No acute distress, awake, alert, appears to feel poorly  HENT: NCAT, mucous membranes moist  Respiratory: Clear to auscultation bilaterally, respiratory effort normal   Cardiovascular: RRR, no murmurs, rubs, or gallops, palpable pedal pulses bilaterally  Gastrointestinal: Positive bowel sounds, soft, nontender, nondistended,  Musculoskeletal: Left BKA stump intact. Right leg wound dressed  Psychiatric: Appropriate affect, cooperative  Neurologic: Oriented x 3, strength symmetric in all extremities, Cranial Nerves grossly intact to confrontation, speech clear  Skin: No rashes    Results Reviewed:  Results from last 7 days   Lab Units 20  0442 20  0238   WBC 10*3/mm3 8.24 10.89*   HEMOGLOBIN g/dL 8.8* 9.1*   HEMATOCRIT % 29.7* 29.7*   PLATELETS 10*3/mm3 280 331     Results from last 7 days   Lab Units 10/01/20  0550 20  0555 20  0442 20  0238   SODIUM mmol/L 137 135* 140 136   POTASSIUM mmol/L 5.5* 4.8 5.2 5.0   CHLORIDE mmol/L 103 103 107 106   CO2 mmol/L 25.0 23.0 25.0 21.0*   BUN mg/dL 42* 34* 30* 27*   CREATININE mg/dL 1.04 0.67* 0.72* 0.58*   GLUCOSE mg/dL 126* 104* 132* 185*   CALCIUM mg/dL 8.7 8.6 8.7 8.6   ALT (SGPT) U/L  --   --   --  13   AST (SGOT) U/L  --   --   --  16   TROPONIN T ng/mL   --   --   --  0.170*   PROBNP pg/mL  --   --   --  10,501.0*     Estimated Creatinine Clearance: 117.3 mL/min (by C-G formula based on SCr of 1.04 mg/dL).    Microbiology Results Abnormal     Procedure Component Value - Date/Time    Urine Culture - Urine, Urine, Catheter [[id number]]  (Abnormal) Collected: 09/28/20 0237    Lab Status: Edited Result - FINAL Specimen: Urine, Catheter Updated: 10/01/20 1151     Urine Culture Yeast isolated     Comment: Two different colony morphologies noted for yeast.    No further workup.    Corrected result. Previously Reported Organism Changed. Previous result was Mixed Haily Isolated on 9/29/2020 at 1147 EDT.         50,000 CFU/mL Gram Positive Rods     Comment: Two different colony morphologies noted for Gram Positive Bacilli.  Appended report. These results have been appended to a previously final verified report.         <25,000 CFU/mL Gram Positive Rods     Comment: Two different colony morphologies noted for Gram Positive Bacilli.  Appended report. These results have been appended to a previously final verified report.       Blood Culture - Blood, Arm, Right [[id number]] Collected: 09/28/20 0231    Lab Status: Preliminary result Specimen: Blood from Arm, Right Updated: 10/01/20 0300     Blood Culture No growth at 3 days    Blood Culture - Blood, Arm, Left [[id number]] Collected: 09/28/20 0231    Lab Status: Preliminary result Specimen: Blood from Arm, Left Updated: 10/01/20 0300     Blood Culture No growth at 3 days    COVID PRE-OP / PRE-PROCEDURE SCREENING ORDER (NO ISOLATION) - Swab, Nasopharynx [[id number]] Collected: 09/29/20 0726    Lab Status: Final result Specimen: Swab from Nasopharynx Updated: 09/29/20 1634    Narrative:      The following orders were created for panel order COVID PRE-OP / PRE-PROCEDURE SCREENING ORDER (NO ISOLATION) - Swab, Nasopharynx.  Procedure                               Abnormality         Status                     ---------                                -----------         ------                     COVID-19,[facility], NP ...[[id number]]                      Final result                 Please view results for these tests on the individual orders.    COVID-19,[facility], NP SWAB IN LEXAR VIRAL TRANSPORT MEDIA 24-30 HR TAT - Swab, Nasopharynx [[id number]] Collected: 09/29/20 0726    Lab Status: Final result Specimen: Swab from Nasopharynx Updated: 09/29/20 1634     SARS-CoV-2 KETAN Not Detected    Clostridium Difficile Toxin - Stool, Per Rectum [[id number]]  (Normal) Collected: 09/28/20 0518    Lab Status: Final result Specimen: Stool from Per Rectum Updated: 09/28/20 0724    Narrative:      The following orders were created for panel order Clostridium Difficile Toxin - Stool, Per Rectum.  Procedure                               Abnormality         Status                     ---------                               -----------         ------                     Clostridium Difficile To...[[id number]]  Normal              Final result                 Please view results for these tests on the individual orders.    Clostridium Difficile Toxin, PCR - Stool, Per Rectum [[id number]]  (Normal) Collected: 09/28/20 0518    Lab Status: Final result Specimen: Stool from Per Rectum Updated: 09/28/20 0724     C. Difficile Toxins by PCR Not Detected    Narrative:      Performance characteristics of test not established for patients <2 years of age.  Negative for Toxigenic C. Difficile          Results for orders placed during the hospital encounter of 07/07/20   Adult Transthoracic Echo Complete W/ Cont if Necessary Per Protocol    Narrative · Estimated EF = 50%.  · Left ventricular systolic function is mildly decreased.  · There is calcification of the aortic valve mainly affecting the non   coronary cusp(s).  · Aortic valve maximum pressure gradient is 11.7 mmHg.          I have reviewed the medications:  Scheduled Meds:amLODIPine, 5 mg, Oral, Daily  aspirin, 81 mg, Oral,  Daily  DULoxetine, 60 mg, Oral, Daily  enoxaparin, 40 mg, Subcutaneous, Q24H  insulin lispro, 0-7 Units, Subcutaneous, TID AC  meropenem, 1 g, Intravenous, Q8H  metoprolol tartrate, 100 mg, Oral, Q12H  micafungin (MYCAMINE) IV, 100 mg, Intravenous, Q24H  nystatin, , Topical, Q12H  oxyCODONE-acetaminophen, 1 tablet, Oral, TID  QUEtiapine, 25 mg, Oral, Nightly  saccharomyces boulardii, 250 mg, Oral, BID  sodium chloride, 10 mL, Intravenous, Q12H  sodium zirconium cyclosilicate, 10 g, Oral, Once  terazosin, 10 mg, Oral, Nightly  vancomycin, 125 mg, Oral, Q6H      Continuous Infusions:   PRN Meds:.•  acetaminophen **OR** acetaminophen **OR** acetaminophen  •  dextrose  •  dextrose  •  glucagon (human recombinant)  •  Lidocaine HCl Urethral/Mucosal 2%  •  ondansetron  •  sodium chloride  •  sodium chloride    Assessment/Plan   Assessment & Plan     Active Hospital Problems    Diagnosis  POA   • AMS (altered mental status) [R41.82]  Unknown   • Acute sepsis (CMS/HCC) [A41.9]  Yes   • Wound of right leg [S81.801A]  Yes   • History of Clostridioides difficile colitis [Z86.19]  Not Applicable   • Bipolar disorder (CMS/HCC) [F31.9]  Yes   • Acute UTI (urinary tract infection) [N39.0]  Yes   • Peripheral vascular disease (CMS/HCC) [I73.9]  Yes   • Type 2 diabetes mellitus with circulatory disorder and uncontrolled [E11.59]  Yes   • Hyperlipemia [E78.5]  Yes   • Essential hypertension [I10]  Yes      Resolved Hospital Problems   No resolved problems to display.        Brief Hospital Course to date:  Kendrick Crystal is a 50 y.o. male with uncontrolled DMII with neuropathy, HLD, HTN, DUNLAP cirrhosis, PVD s/p L BKA, BPH, perirectal abscess in October 2019, and perinephric abscess in October 2019, extensive prostate/periprostatic abscess that underwent surgery on 06/18/20, Hx of c.diff, most recently admitted in July for sepsis and abd abscesses.      He presented to ED with progressively worsening weakness over past 2 weeks,  confusion, more lethargic. UTI found.  Also recent orthopnea, polyuria/pedal edema.  Noncompliant with most of his medication including diuretics as well as Lantus.  Also complains of loose BM-which is usually clear, denies any complaint of fever or cough.      UTI  Complex recent history of periprostate abscess, had surg in 6/202, known to ID group  Currently having urinary retention with overflow incontinence   - He has a history of recurrent klebsiella UTI and ecoli urine cultures as well as history of periprostatic abscess, with negative CT A/P on admission  - Have d/w Dr Garcia. Urine cx now with GPR. Continue merrem, mycafungin. Final abx per ID.     Acute on chronic mixed CHF (Ef 50%); noncompliant w home diuretics   Bilateral pleural effusions  Pulmonary edema  Hypoxia  - He had had good output with IV lasix, but maybe a little overdiuresed today based on labs. Hold further diuretics, reevaluate in am.   - Daily weights, strict I's and O's.     HyperK  - Hold diuretics as above. Lokelma once. Recheck this pm and in am.     AMS reported on admission  - Resolved. Likely due to infection (UTI), ABG wnl, cT head nl      Hx of C.diff - with intermittent diarrhea  - C diff toxin and PCR neg      RLE neuropathic pain  RLE wounds, PAD  nonambulatory at home.   - Dr. Arriola has seen, felt patient does not need operative mgmt at this time. Recs wound care.  - No pain complaints today. Continue perc TID per home dose; has a pain clinic, may need increased dose     DM2  - At home he is on lantus 20 q12 with TID log. He is noncompliant with this at home.  - Glucose controlled today. SSI with accuchecks AC/HS     HTN  - Controlled today. Continue amlodipine, lopressor     Bipolar DO, depression - cont cymbalta, resumed seroquel     This patient's problems and plans were partially entered by my partner and updated as appropriate by me 09/30/20.     DVT prophylaxis: lovenox SQ       Disposition: I expect the patient to be  discharged tbd    CODE STATUS:   Code Status and Medical Interventions:   Ordered at: 09/28/20 0510     Limited Support to NOT Include:    Intubation     Code Status:    No CPR     Medical Interventions (Level of Support Prior to Arrest):    Limited     Comments:    per prior code status orders       Eugenia Elizondo II, DO  10/01/20

## 2020-10-01 NOTE — NURSING NOTE
Spoke with Christelle GARCIA regarding low Joseph of 14. Per Christelle skin interventions in place; on [facility]olphin bed; no new concerns at this time. Please contact [facility] nurse as needed for concerns.

## 2020-10-01 NOTE — PLAN OF CARE
Goal Outcome Evaluation:  Plan of Care Reviewed With: patient, spouse  Progress: no change   Pt confused during shift, wife asking about a suprapubic catheter, will continue to monitor

## 2020-10-02 NOTE — PROGRESS NOTES
[facility] Progress Note        Antibiotics:  Anti-Infectives (From admission, onward)    Ordered     Dose/Rate Route Frequency Start Stop    20 1633  micafungin 100 mg/100 mL 0.9% NS IVPB (mbp)     Ordering Provider: Usman Dong MD    100 mg  over 60 Minutes Intravenous Every 24 Hours 20 1800 10/05/20 1759    20 0531  meropenem (MERREM) 1 g/100 mL 0.9% NS VTB (mbp)     Ordering Provider: Sridevi Xavier PA    1 g  over 3 Hours Intravenous Every 8 Hours Scheduled 20 0800 10/05/20 0759    20 0531  vancomycin oral solution reconstituted 125 mg     Ordering Provider: Sridevi Xavier PA    125 mg Oral Every 6 Hours Scheduled 20 0600 10/05/20 0559          CC:    HPI:  Patient is a 50 y.o.  Yr old male with history of poorly contolled T2DM, DUNLAP/liver cirrhosis, HTN, PVD/Left BKA, and multiple skin abscesses/MRSA who presented to [facility] ED with right shin wound infection summer 2018.  He was unable to get to see Dr. Martinez as his father passed away unexpectedly on 18 and he had to wait until after the . The wound worsened becoming gangrenous and he came to [facility] ED in 2018.  He also had been hospitalized at [facility] and then transferred to  for a severe penis/scrotum infection requiring surgical debridement in summer 2018.  He received antibiotics regarding his right leg infection, generally improved over the remainder of summer 2018 but that area had not completely healed. He was readmitted 2019 with acute left lower abdominal wall redness/swelling and pain, spontaneous drainage associated with fever/chills and uncontrolled blood sugars.   I&D requiring wide debridement , severe/deep infection and transferred to [facility] on May 3, 2019 with IV Zosyn/oral doxycycline. Cultures had lactobacillus and mixed gram-positive skin sherly including alpha strep, staph epidermidis and corynebacterium. Readmitted to [facility]  [facility] May 18, 2019, increasing generalized edema ultimately transitioned to oral doxycycline and healed.     Readmitted July 30, 2019 with acute rectal pain that had begun 7/27; this is associated with constipation for days, chills and nausea/dry heaving.  White blood cell count elevated, high hemoglobin A1c over 13, urinalysis with hematuria/pyuria and CT scan with 3 x 3 cm fluid collection anterior to the distal rectum and per discussion with Dr. Akbar/Jennifer, this was not in the prostate.  Colorectal surgery/urology saw him for consideration of surgical options/timing/threshold, etc..     8/2/19 I&Dper Dr Payne perirectal abscess; patient reports that he had instrumented his rectum prior to hospitalization with enema     8/3/19 urinary retention overnight requiring in/out catheterization per patient.  Creat climb     8/4/19 further creat climb; childs placed and lisinopril stopped and d/w Dr Rubio;  ?obstruction initially associated with retention postop (1200 out with I/O cath postop per nursing) -v- contrast from CT -v- lisinopril/medication/vancomycin -v- relative hypotension -v- likely combination of factors with ATN; nephrology following; vancomycin trough high and vancomycin stopped empirically 8/3 although high trough potentially accumulation as a consequence of diminishing renal function associated with retention/contrast/pressure rather than cause.  Nonetheless, opted to avoid at that time; creatinine trending down.      8/7/19 in retrospect he remembers air in his urine at admit which has raised concern for possible fistula from urinary tract;  No fecaluria and culture from abscess is fecal sherly;  ?rectal-urethral fistula;  Dr Payne aware     Culture with E. coli/Klebsiella species and creatinine generally trended down, transitioned to oral antibiotics at discharge.     Readmitted August 17, 2019 with nausea/vomiting/dry heaves for 3 days.  Childs catheter was placed and CT scan of the abdomen showed:  "     \"Previously seen fluid collection identified inferior to the prostate gland is more increased in density on today's examination. There is wall thickening again seen throughout the bladder. Findings again are concerning for cystitis.\" per radiology     He was transitioned to oral omnicef/topical antifungal on approx 8/23/19; Noncompliant with f/u in our office     READMITTED 10/8/19 RLQ abd pain, urinary retention, nausea, dysuria and generalized fatigue     UTI by U/A, subsequent blood cultures positive for  kleb sp, urine with kleb and e coli ;  Abnormal CT abd with right perinephric fluid collection, advanced cirrhosis, urinary bladder thickening.  10/9 Aspirate of perinephric fluid collection consistent with abscess/kleb and white blood cells; 10/16/19 cytoscopy with bullous change per Dr Gutierres but no fistula per him; 10/19/19 intermittent nausea, no vomiting or dry heaves this am, intermittent dry cough broadened to zosyn with ?aspiration pneumonia evolving after dry heaves/vomiting and had intermittent diarrhea, oral vancomycin added empirically with history of prior C. Difficile; improved with IV Zosyn/oral vancomycin and he refused consideration of placement.  He insisted on home, discharged October 23 and noncompliant with outpatient therapy and outpatient follow-up October 25.  He had become fatigued such that his family could not assist him out of bed and it was recommended by my office that he return to the emergency room October 25.  He apparently refused that but did come to the emergency room October 26.     READMITTED October 26, 2019 with generalized weakness, fatigue/malaise and nausea/vomiting/diarrhea.  CT scan revealed increased size of perinephric fluid collection per radiology. 10/29 drain placed; 11/5/19 drain inadvertently out overnight per him;11/8 voiding cystourethrogram per urology; I d/w Dr Escobedo 11/11 and he reviewed the US from 11/8 and he reports to me US is adequate for " "followup on this and NO current evidence for abscess, no need for CT scan at that time to evaluate abscess per d/w him and given clinical improvements/radiographic improvements; finished abx prior to discharge 11/14 and noncompliant with physical therapy, he refused placement and insisted on home.     READMITTED 11/18/19 with multifactorial complaints.  He reported generalized fatigue and inability to move himself around, increased dyspnea with dry cough and dry heaves, urinary frequency/retention and small amounts of urination; noted to have leukocytosis, elevated troponin/BNP, with hematuria/pyuria and bacteriuria/small yeast; chest x-ray with increased pulmonary vascularity bilaterally and ill-defined opacification left lung base with small left pleural effusion per radiology.  CT abdomen with large pleural effusions with dependent airspace disease, likely atelectasis per radiology and limited ability to evaluate for right sided perinephric abscess per radiology     11/22 CT chest no focal consolidation per radiology; 11/23/19 JUAN after diuresis; later in November he was discharged with oral antibiotics but noncompliant with follow-up.     Readmitted June 7, 2020 with inability to urinate, dysuria and nausea/dry heaves with subjective fever.  Had severe leukocytosis with acute pyuria/hematuria concerning for UTI and subsequent urinary culture with gram-negative lon.  CT scan of the abdomen/pelvis with concern for acute suppurative prostatitis and possible prostatic abscess per radiology in addition to soft tissue thickening extending to the left lower pelvic sidewall.  No evidence for urinary tract obstruction with indwelling Walker catheter.  No specific mention of perinephric collection and no specific focal bowel abnormality described per radiology     He had inability to urinate with hesitancy and dysuria.       6/11 flex sig by Dr Payne     6/18/20 surgery by Dr Paez:  \"PROCEDURE:  Cystoscopy with transurethral " "resection of prostatic abscess and prostate obtaining tissue culture and permanent pathology.\"     Readmitted July 7, 2020; Per Dr Garza Consult: Prolonged hospital course with numerous complications.  He was followed by Dr. Dong during most of the hospital stay.  He had a prostate abscess which was resected on 6/18.  Culture is polymicrobial with Klebsiella, Candida glabrata and lactobacillus.  He was treated with IV Unasyn, Flagyl, micafungin, also oral vancomycin due to history of C. Difficile.  Eventually he was deemed appropriate for transfer to skilled nursing facility but patient refused placement.  Medicare would no longer pay for his hospital admission so patient requested discharge home.  He left the hospital on 7/3 on oral antibiotics: Augmentin and oral vancomycin, with plans to discuss transition to hospice as an outpatient given his overall poor prognosis and reluctance to comply with recommendations.  He was admitted to [facility] on 7/7 where a CTA of the chest was negative for PE but had severe  bilateral atelectasis and bilateral pleural effusions.  CT of the abdomen was concerning for multiloculated pelvic abscesses and possibly splenic microabscesses. Received polymicrobial coverage, moved out of ICU with pigtail drain in place regarding pleural effusion and eventually had repeat imaging. 7/16 CT with no abscess per radiology; 7/23/20  Noncompliance continues, refusing placement, refusing oral vancomycin and general noncompliant behavior; he eventually was tapered to ceftriaxone/Mycamine and oral vancomycin solution, transitioned to facility.  In August he was D escalated to oral Omnicef/vancomycin solution and seen August 17 with general clinical improvements.  Noncompliant with 2 subsequent outpatient visits.     Readmitted to [facility] on September 28, 2020; he had reported feeling fatigued and generally not feeling well, reports by family  Of confusion " although at the time of my consultation he is oriented to person/place/year,, family and situation.  He has had urinary frequency with dysuria, diarrhea intermittently although he is unable to quantify, and subjective fever.  He is placed on empiric antimicrobials. Noted to have bilateral pleural effusions with pulmonary edema,  Hematuria/pyuria with concern for recurrent UTI     10/2/20  Seen early and sleepy; nursing reports noncompliance  In general;  No diarrhea. less dysuria/frequency and occasional incontinence.  No flank pain. He has been intermittently noncompliant with nursing staff  .  He is on nasal cannula oxygen but denies cough or hemoptysis.  No rash.  No vomiting.  No abdominal pain.  He has chronic wounds at the right lower leg but no new redness/swelling there and no new purulent drainage/malodor.no headache photophobia or neck stiffness.       No other particular exposures    ROS:      10/2/20 No f/c/s. No n/v/d. No rash. No new ADR to Abx.     No rash. No new ADR to Abx.      Constitutional-- No chills or sweats.  Appetite diminished with fatigue   Heent-- No new vision, hearing or throat complaints.  No epistaxis or oral sores.  Denies odynophagia or dysphagia.  No flashers, floaters or eye pain. No odynophagia or dysphagia. No headache, photophobia or neck stiffness.  CV-- No chest pain, palpitation or syncope  Resp-- as above  GI-  No hematochezia, melena, or hematemesis. Denies jaundice or chronic liver disease.  --as above  Lymph- no swollen lymph nodes in neck/axilla or groin.   Heme- No active bruising or bleeding; no Hx of DVT or PE.  MS-- no swelling or pain in the bones or joints of arms/legs aside from above.  No new back pain.  Neuro-- No acute focal weakness or numbness in the arms or legs.  No seizures.     Full 12 point review of systems reviewed and negative otherwise for acute complaints, except for above       PE:   /87 (BP Location: Right arm, Patient Position: Lying)   " Pulse 62   Temp 96.3 °F (35.7 °C) (Axillary)   Resp 18   Ht 190.5 cm (75\")   Wt 99.8 kg (220 lb)   SpO2 100%   BMI 27.50 kg/m²     GENERAL: sleepy,  in no acute distress.  Chronically ill  HEENT: Normocephalic, atraumatic.  PERRL. EOMI. No conjunctival injection. No icterus. Oropharynx clear without evidence of thrush or exudate. No evidence of peridontal disease.    NECK: Supple without nuchal rigidity. No mass.  LYMPH: No cervical, axillary or inguinal lymphadenopathy.  HEART: RRR; No murmur, rubs, gallops.   LUNGS: Diminished at bases to auscultation bilaterally without wheezing, rales, rhonchi. Normal respiratory effort. Nonlabored. No dullness.  ABDOMEN: Soft, non tender, nondistended. Positive bowel sounds. No rebound or guarding. NO mass or HSM.   vague erythema and satellite lesions in the skin fold consistent with cutaneous candidiasis  EXT:  No cyanosis, clubbing or edema. No cord.  MSK: FROM without joint effusions noted arms/legs.    SKIN: Warm and dry without cutaneous eruptions on Inspection/palpation.    NEURO: sleepy     No peripheral stigmata/phenomena of endocarditis     Right lower leg with clean wounds through the dermis to the soft tissue but no probe to bone tendon joint or ligament.  No purulence and no surrounding redness induration or warmth.  No mass bulge or fluctuance.     Left amputation site with no open wound or active drainage.  No redness induration or warmth.  No mass bulge or fluctuance.  No crepitus or bulla.       Laboratory Data    Results from last 7 days   Lab Units 09/29/20  0442 09/28/20  0238   WBC 10*3/mm3 8.24 10.89*   HEMOGLOBIN g/dL 8.8* 9.1*   HEMATOCRIT % 29.7* 29.7*   PLATELETS 10*3/mm3 280 331     Results from last 7 days   Lab Units 10/01/20  1408   SODIUM mmol/L 136   POTASSIUM mmol/L 5.3*   CHLORIDE mmol/L 104   CO2 mmol/L 22.0   BUN mg/dL 42*   CREATININE mg/dL 0.83   GLUCOSE mg/dL 114*   CALCIUM mg/dL 8.6     Results from last 7 days   Lab Units " 09/28/20  0238   ALK PHOS U/L 222*   BILIRUBIN mg/dL 0.3   ALT (SGPT) U/L 13   AST (SGOT) U/L 16               Estimated Creatinine Clearance: 147 mL/min (by C-G formula based on SCr of 0.83 mg/dL).      Microbiology:      Radiology:  Imaging Results (Last 72 Hours)     ** No results found for the last 72 hours. **            Impression:     --acute pyuria/hematuria with symptomatology concerning for recurrent UTI.   Empiric antimicrobials ongoing.  Further adjustments to depend on clinical course a study results and response to therapy; culture mixed     --acute confusion/encephalopathy.  Better by the time of my consultation and oriented.  Further neurologic workup at discretion of internal medicine including subspecialty evaluation with neurology as they see fit.  No meningismus.  Present     --Bilateral pleural effusion/pulmonary edema per admission note.  Pneumonia less likely at present      --cutaneous candidiasis.  Mycamine added     --History multiple pelvic abscesses near prostate in addition to possible splenic microabscesses versus infarct by prior outside hospital CT scan July 2020; prior partial prostatectomy  With concern for chronic prostatitis given multiple past admissions with Klebsiella. repeat CT scan July 16, 2020 with no abscess per radiology.  Lengthy antimicrobial therapy between July/August and CT scan in September 2020 of the description of abscess by radiology. Monitor for potential relapse     --History C. Difficile with intermittent diarrhea although C. Difficile PCR negative most recently.  Continue oral vancomycin solution for now with high risk for relapse     --History MRSA previously; leg stable with no active soft tissue infection at present.     --history left BKA.     --Peripheral vascular disease     --Diabetes.  Previously uncontrolled     --Medical noncompliance     --possible cirrhosis per imaging per radiology.  Further workup or referral at discretion of medicine  team    PLAN:     --Merrem I.V./Mycamine/dasptomycin IV     --Vancomycin oral solution     --History per family and nursing staff     --Highly complex set of issues with high risk for further serious morbidity and other serious sequela     --Discussed with microbiology     --Check/review labs cultures and scans     --his medical noncompliance remains a significant barrier to care.  Family aware      --COVID negative from 9/29    --d/w Dr Caro oDng MD  10/2/2020

## 2020-10-02 NOTE — PROGRESS NOTES
[facility] Medicine Services  PROGRESS NOTE    Patient Name: Kendrick Crystal  : 1970  MRN: [id number]    Date of Admission: 2020  Primary Care Physician: Stanley Carrasco DO    Subjective   Subjective     CC: f/u AMS    HPI: Much more lethargic and less responsive this am. Per nursing has not been eating/drinking. Less UOP.    Review of Systems  UTO due to AMS.    Objective   Objective     Vital Signs:   Temp:  [96.3 °F (35.7 °C)-98.1 °F (36.7 °C)] 97.9 °F (36.6 °C)  Heart Rate:  [62-75] 63  Resp:  [14-20] 14  BP: (119-141)/(84-91) 141/91        Physical Exam:  Constitutional: Mild distress, somnolent,   HENT: NCAT, mucous membranes moist  Respiratory: Clear to auscultation bilaterally, respiratory effort normal   Cardiovascular: RRR, no murmurs, rubs, or gallops, palpable pedal pulses bilaterally  Gastrointestinal: Positive bowel sounds, soft, nontender, nondistended, obese  Musculoskeletal: LLE stump intact. Right leg dressed.  Psychiatric: KALYN  Neurologic: KALYN  Skin: No rashes    Results Reviewed:  Results from last 7 days   Lab Units 10/02/20  0910 20  0442 20  0238   WBC 10*3/mm3 7.86 8.24 10.89*   HEMOGLOBIN g/dL 9.2* 8.8* 9.1*   HEMATOCRIT % 30.3* 29.7* 29.7*   PLATELETS 10*3/mm3 284 280 331     Results from last 7 days   Lab Units 10/02/20  0910 10/01/20  1408 10/01/20  0550  20  0238   SODIUM mmol/L 136 136 137   < > 136   POTASSIUM mmol/L 5.4* 5.3* 5.5*   < > 5.0   CHLORIDE mmol/L 104 104 103   < > 106   CO2 mmol/L 24.0 22.0 25.0   < > 21.0*   BUN mg/dL 43* 42* 42*   < > 27*   CREATININE mg/dL 0.77 0.83 1.04   < > 0.58*   GLUCOSE mg/dL 106* 114* 126*   < > 185*   CALCIUM mg/dL 8.3* 8.6 8.7   < > 8.6   ALT (SGPT) U/L 10  --   --   --  13   AST (SGOT) U/L 21  --   --   --  16   TROPONIN T ng/mL  --   --   --   --  0.170*   PROBNP pg/mL  --   --   --   --  10,501.0*    < > = values in this interval not displayed.     Estimated Creatinine Clearance:  158.4 mL/min (by C-G formula based on SCr of 0.77 mg/dL).    Microbiology Results Abnormal     Procedure Component Value - Date/Time    Blood Culture - Blood, Arm, Right [[id number]] Collected: 09/28/20 0231    Lab Status: Preliminary result Specimen: Blood from Arm, Right Updated: 10/02/20 0300     Blood Culture No growth at 4 days    Blood Culture - Blood, Arm, Left [[id number]] Collected: 09/28/20 0231    Lab Status: Preliminary result Specimen: Blood from Arm, Left Updated: 10/02/20 0300     Blood Culture No growth at 4 days    Urine Culture - Urine, Urine, Catheter [[id number]]  (Abnormal) Collected: 09/28/20 0237    Lab Status: Edited Result - FINAL Specimen: Urine, Catheter Updated: 10/01/20 1151     Urine Culture Yeast isolated     Comment: Two different colony morphologies noted for yeast.    No further workup.    Corrected result. Previously Reported Organism Changed. Previous result was Mixed Haily Isolated on 9/29/2020 at 1147 EDT.         50,000 CFU/mL Gram Positive Rods     Comment: Two different colony morphologies noted for Gram Positive Bacilli.  Appended report. These results have been appended to a previously final verified report.         <25,000 CFU/mL Gram Positive Rods     Comment: Two different colony morphologies noted for Gram Positive Bacilli.  Appended report. These results have been appended to a previously final verified report.       COVID PRE-OP / PRE-PROCEDURE SCREENING ORDER (NO ISOLATION) - Swab, Nasopharynx [[id number]] Collected: 09/29/20 0726    Lab Status: Final result Specimen: Swab from Nasopharynx Updated: 09/29/20 1634    Narrative:      The following orders were created for panel order COVID PRE-OP / PRE-PROCEDURE SCREENING ORDER (NO ISOLATION) - Swab, Nasopharynx.  Procedure                               Abnormality         Status                     ---------                               -----------         ------                     COVID-DARRIUS García, NP ...[[id number]]                       Final result                 Please view results for these tests on the individual orders.    COVID-19,[facility], NP SWAB IN LEXAR VIRAL TRANSPORT MEDIA 24-30 HR TAT - Swab, Nasopharynx [[id number]] Collected: 09/29/20 0726    Lab Status: Final result Specimen: Swab from Nasopharynx Updated: 09/29/20 1634     SARS-CoV-2 KETAN Not Detected    Clostridium Difficile Toxin - Stool, Per Rectum [[id number]]  (Normal) Collected: 09/28/20 0518    Lab Status: Final result Specimen: Stool from Per Rectum Updated: 09/28/20 0724    Narrative:      The following orders were created for panel order Clostridium Difficile Toxin - Stool, Per Rectum.  Procedure                               Abnormality         Status                     ---------                               -----------         ------                     Clostridium Difficile To...[[id number]]  Normal              Final result                 Please view results for these tests on the individual orders.    Clostridium Difficile Toxin, PCR - Stool, Per Rectum [[id number]]  (Normal) Collected: 09/28/20 0518    Lab Status: Final result Specimen: Stool from Per Rectum Updated: 09/28/20 0724     C. Difficile Toxins by PCR Not Detected    Narrative:      Performance characteristics of test not established for patients <2 years of age.  Negative for Toxigenic C. Difficile          Imaging Results (Last 24 Hours)     ** No results found for the last 24 hours. **          Results for orders placed during the hospital encounter of 07/07/20   Adult Transthoracic Echo Complete W/ Cont if Necessary Per Protocol    Narrative · Estimated EF = 50%.  · Left ventricular systolic function is mildly decreased.  · There is calcification of the aortic valve mainly affecting the non   coronary cusp(s).  · Aortic valve maximum pressure gradient is 11.7 mmHg.          I have reviewed the medications:  Scheduled Meds:amLODIPine, 5 mg, Oral, Daily  aspirin, 81 mg, Oral,  Daily  DAPTOmycin, 4 mg/kg, Intravenous, Q24H  DULoxetine, 60 mg, Oral, Daily  enoxaparin, 40 mg, Subcutaneous, Q24H  insulin lispro, 0-7 Units, Subcutaneous, TID AC  meropenem, 1 g, Intravenous, Q8H  metoprolol tartrate, 100 mg, Oral, Q12H  micafungin (MYCAMINE) IV, 100 mg, Intravenous, Q24H  nystatin, , Topical, Q12H  oxyCODONE-acetaminophen, 1 tablet, Oral, TID  QUEtiapine, 25 mg, Oral, Nightly  saccharomyces boulardii, 250 mg, Oral, BID  sodium chloride, 10 mL, Intravenous, Q12H  terazosin, 10 mg, Oral, Nightly  vancomycin, 125 mg, Oral, Q6H      Continuous Infusions:   PRN Meds:.•  acetaminophen **OR** acetaminophen **OR** acetaminophen  •  dextrose  •  dextrose  •  glucagon (human recombinant)  •  Lidocaine HCl Urethral/Mucosal 2%  •  ondansetron  •  sodium chloride  •  sodium chloride    Assessment/Plan   Assessment & Plan     Active Hospital Problems    Diagnosis  POA   • AMS (altered mental status) [R41.82]  Unknown   • Acute sepsis (CMS/HCC) [A41.9]  Yes   • Wound of right leg [S81.801A]  Yes   • History of Clostridioides difficile colitis [Z86.19]  Not Applicable   • Bipolar disorder (CMS/HCC) [F31.9]  Yes   • Acute UTI (urinary tract infection) [N39.0]  Yes   • Peripheral vascular disease (CMS/[facility]) [I73.9]  Yes   • Type 2 diabetes mellitus with circulatory disorder and uncontrolled [E11.59]  Yes   • Hyperlipemia [E78.5]  Yes   • Essential hypertension [I10]  Yes      Resolved Hospital Problems   No resolved problems to display.        Brief Hospital Course to date:  Kendrick Crystal is a 50 y.o. male with uncontrolled DMII with neuropathy, HLD, HTN, DUNLAP cirrhosis, PVD s/p L BKA, BPH, perirectal abscess in October 2019, and perinephric abscess in October 2019, extensive prostate/periprostatic abscess that underwent surgery on 06/18/20, Hx of c.diff, most recently admitted in July for sepsis and abd abscesses.      He presented to ED with progressively worsening weakness over past 2 weeks, confusion, more  lethargic. UTI found.  Also recent orthopnea, polyuria/pedal edema. He was thought to have AMS due to UTI and initially improved after broad spectrum IV antibiotics. He became more somnolent 10/2 with unclear cause.    Metabolic encephalopathy  - New today. Unclear cause. No evidence of worsening infectious symptoms. Stat CBC, CMP, lactate, ammonia, ABG, glucose unremarkable.   - Check CT head.  - Stop seroquel, opiates.    UTI  Complex recent history of periprostate abscess, had surg in 6/202, known to ID group  Currently having urinary retention with overflow incontinence   - He has a history of recurrent klebsiella UTI and ecoli urine cultures as well as history of periprostatic abscess, with negative CT A/P on admission  - Have d/w Dr Garcia. Urine cx now with GPR. Continue merrem, mycafungin. Final abx per ID.     Acute on chronic mixed CHF (Ef 50%); noncompliant w home diuretics   Bilateral pleural effusions  Pulmonary edema  Hypoxia  - He had had good output with IV lasix, but probably still a little dry given decreased UOP and hyperK. Continue to hold IV diuretics.  - Daily weights, strict I's and O's.      HyperK  - Probably related due to overdiuresis. Hold diuretics as above. Repeat lokelma once. Recheck in am.  - Start IVF at low rate.     Hx of C.diff - with intermittent diarrhea  - C diff toxin and PCR neg      RLE neuropathic pain  RLE wounds, PAD, nonambulatory at home.   - Dr. Arriola has seen, felt patient does not need operative mgmt at this time. Recs wound care.  - No pain complaints today. Continue perc TID per home dose; has a pain clinic, may need increased dose.     DM2  - At home he is on lantus 20 q12 with TID log. He is noncompliant with this at home.  - Glucose controlled today. SSI with accuchecks AC/HS     HTN  - Controlled today. Continue amlodipine, lopressor     Bipolar DO, depression - cont cymbalta, resumed seroquel    This patient's problems and plans were partially entered by my  partner and updated as appropriate by me 10/02/20.     DVT prophylaxis: lovenox SQ       Disposition: I expect the patient to be discharged tbd    CODE STATUS:   Code Status and Medical Interventions:   Ordered at: 09/28/20 0510     Limited Support to NOT Include:    Intubation     Code Status:    No CPR     Medical Interventions (Level of Support Prior to Arrest):    Limited     Comments:    per prior code status orders       Eugenia Elizondo II, DO  10/02/20

## 2020-10-02 NOTE — PROGRESS NOTES
Continued Stay Note  [facility]     Patient Name: Kendrick Caruso  MRN: [id number]  Today's Date: 10/2/2020    Admit Date: 9/28/2020    Discharge Plan     Row Name 10/02/20 1512       Plan    Plan  Home with HH    Plan Comments  Case mgt f/u. Received a call from [facility] (who currently follows Mr Caruso at home), stating they will no longer follow him at home secondary to safety concerns re: wife's ability to manage his care. Discussed with Mr caruso and wife. He has refused to go to SNF. He was  in a SNF in [place] for 22 days this year. In addition to his wife, he has a caregiver 40hrs per week ( through his Medicaid waiver program). They are agreeable to a referral to [facility]. I spoke with Eugenia who accepted referral. HH will f/u when he is ready for d/c.    Final Discharge Disposition Code  06 - home with home health care        Discharge Codes    No documentation.       Expected Discharge Date and Time     Expected Discharge Date Expected Discharge Time    Oct 4, 2020             Sonja C Kellerman, RN

## 2020-10-02 NOTE — PLAN OF CARE
Patient lethargic most of shift.  Refuses to eat and drink.  Will not take any oral medications.  CT of head ordered by hospitalist team due to lethargy, patient refused.  Wife at bedside.  IV abx continued.  D5.45NS ordered, urine output improved this shift.  Blood sugars ranging .  Specialty bed in use.  VSS.  Will continue to monitor.

## 2020-10-02 NOTE — PLAN OF CARE
Goal Outcome Evaluation:  Plan of Care Reviewed With: patient  Progress: no change  Outcome Summary: VSS, on 2L NC. confused. refused PO vanc. Q2 turned. on speciality bed.

## 2020-10-03 NOTE — PLAN OF CARE
"A &Ox4, mood/affect appropriate/flat. Speech slightly garbled. Lung sound clear bilaterally. Multiple wounds present to RLE. Foam dressings CDI. PT states, \"I won't tale Gabapentin. It gives me diarrhea.\" Indwelling catheter in place. Insertion site clean/intact. SBP slightly elevated. SR on cardiac mx. Will cont to mx. Call light in reach.      "

## 2020-10-03 NOTE — PLAN OF CARE
Goal Outcome Evaluation:  Plan of Care Reviewed With: patient, spouse  Progress: no change  Outcome Summary: Vitals WNL with the exception of BP. BP has been elevated with systolic BP ranging 160's-170's and diastolic BP in 100's. Dr. Byrnes notified with no new orders received. Pt is in NSR with first degree AV block on telemetry. Pt denies pain, numbness, or tingling. Pt has been tearful throughout the shift stating he wants to go home. He plans to leave AMA tomorrow if not discharged. Walker catheter remains in place. Multiple BMs throughout the shift. No SSI required per order parameters. Pt is non-compliant with Q 2 hr turns. IV antibiotics administered as ordered.

## 2020-10-03 NOTE — PLAN OF CARE
Goal Outcome Evaluation:  Plan of Care Reviewed With: patient, family  Progress: no change  Outcome Summary: Care assumed for pt at approximately 1630. Vitals WNL. NSR on telemetry. Pt is disoriented only to time. Pt denies pain. Continues to refuse PO medications. IV antibiotics administered as ordered. 525 mL of urine emptied fom childs catheter. Pt on specialty bed. Refuses Q 2 turns. No SSI required per order parameters.

## 2020-10-03 NOTE — PROGRESS NOTES
[facility] Medicine Services  PROGRESS NOTE    Patient Name: Kendrick Crystal  : 1970  MRN: [id number]    Date of Admission: 2020  Primary Care Physician: Stanley Carrasco DO    Subjective   Subjective     CC: f/u AMS    HPI:   Patient has no complaints this morning.  No acute events overnight    Review of Systems  General: denies fevers or chills  CV: denies chest pain  Resp: denies shortness of breath  Abd: denies abd pain, nausea      Objective   Objective     Vital Signs:   Temp:  [96.8 °F (36 °C)-99.1 °F (37.3 °C)] 99 °F (37.2 °C)  Heart Rate:  [62-78] 72  Resp:  [14-18] 14  BP: (133-159)/(85-97) 140/92        Physical Exam:  Constitutional: No acute distress, awake, alert, laying in bed eating breakfast  Respiratory: Clear to auscultation bilaterally, respiratory effort normal   Cardiovascular: RRR, no murmurs, rubs, or gallops, palpable pedal pulses bilaterally  Gastrointestinal: Positive bowel sounds, soft, nontender, nondistended  Musculoskeletal: No bilateral ankle edema  Psychiatric: Appropriate affect, cooperative  Neurologic: Oriented x 3, no focal neurological deficits  Skin: No rashes      Results Reviewed:  Results from last 7 days   Lab Units 10/03/20  0426 10/02/20  0910 20  0442   WBC 10*3/mm3 9.31 7.86 8.24   HEMOGLOBIN g/dL 9.2* 9.2* 8.8*   HEMATOCRIT % 30.5* 30.3* 29.7*   PLATELETS 10*3/mm3 287 284 280     Results from last 7 days   Lab Units 10/03/20  0426 10/02/20  0910 10/01/20  1408  20  0238   SODIUM mmol/L 135* 136 136   < > 136   POTASSIUM mmol/L 4.7 5.4* 5.3*   < > 5.0   CHLORIDE mmol/L 105 104 104   < > 106   CO2 mmol/L 22.0 24.0 22.0   < > 21.0*   BUN mg/dL 37* 43* 42*   < > 27*   CREATININE mg/dL 0.60* 0.77 0.83   < > 0.58*   GLUCOSE mg/dL 114* 106* 114*   < > 185*   CALCIUM mg/dL 8.6 8.3* 8.6   < > 8.6   ALT (SGPT) U/L  --  10  --   --  13   AST (SGOT) U/L  --  21  --   --  16   TROPONIN T ng/mL  --   --   --   --  0.170*    PROBNP pg/mL  --   --   --   --  10,501.0*    < > = values in this interval not displayed.     Estimated Creatinine Clearance: 203.3 mL/min (A) (by C-G formula based on SCr of 0.6 mg/dL (L)).    Microbiology Results Abnormal     Procedure Component Value - Date/Time    Blood Culture - Blood, Arm, Right [[id number]] Collected: 09/28/20 0231    Lab Status: Final result Specimen: Blood from Arm, Right Updated: 10/03/20 0300     Blood Culture No growth at 5 days    Blood Culture - Blood, Arm, Left [[id number]] Collected: 09/28/20 0231    Lab Status: Final result Specimen: Blood from Arm, Left Updated: 10/03/20 0300     Blood Culture No growth at 5 days    Urine Culture - Urine, Urine, Catheter [[id number]]  (Abnormal) Collected: 09/28/20 0237    Lab Status: Edited Result - FINAL Specimen: Urine, Catheter Updated: 10/01/20 1151     Urine Culture Yeast isolated     Comment: Two different colony morphologies noted for yeast.    No further workup.    Corrected result. Previously Reported Organism Changed. Previous result was Mixed Haily Isolated on 9/29/2020 at 1147 EDT.         50,000 CFU/mL Gram Positive Rods     Comment: Two different colony morphologies noted for Gram Positive Bacilli.  Appended report. These results have been appended to a previously final verified report.         <25,000 CFU/mL Gram Positive Rods     Comment: Two different colony morphologies noted for Gram Positive Bacilli.  Appended report. These results have been appended to a previously final verified report.       COVID PRE-OP / PRE-PROCEDURE SCREENING ORDER (NO ISOLATION) - Swab, Nasopharynx [[id number]] Collected: 09/29/20 0726    Lab Status: Final result Specimen: Swab from Nasopharynx Updated: 09/29/20 1634    Narrative:      The following orders were created for panel order COVID PRE-OP / PRE-PROCEDURE SCREENING ORDER (NO ISOLATION) - Swab, Nasopharynx.  Procedure                               Abnormality         Status                      ---------                               -----------         ------                     COVID-19,[facility], NP ...[[id number]]                      Final result                 Please view results for these tests on the individual orders.    COVID-19,[facility], NP SWAB IN LEXAR VIRAL TRANSPORT MEDIA 24-30 HR TAT - Swab, Nasopharynx [[id number]] Collected: 09/29/20 0726    Lab Status: Final result Specimen: Swab from Nasopharynx Updated: 09/29/20 1634     SARS-CoV-2 KETAN Not Detected    Clostridium Difficile Toxin - Stool, Per Rectum [[id number]]  (Normal) Collected: 09/28/20 0518    Lab Status: Final result Specimen: Stool from Per Rectum Updated: 09/28/20 0724    Narrative:      The following orders were created for panel order Clostridium Difficile Toxin - Stool, Per Rectum.  Procedure                               Abnormality         Status                     ---------                               -----------         ------                     Clostridium Difficile To...[[id number]]  Normal              Final result                 Please view results for these tests on the individual orders.    Clostridium Difficile Toxin, PCR - Stool, Per Rectum [[id number]]  (Normal) Collected: 09/28/20 0518    Lab Status: Final result Specimen: Stool from Per Rectum Updated: 09/28/20 0724     C. Difficile Toxins by PCR Not Detected    Narrative:      Performance characteristics of test not established for patients <2 years of age.  Negative for Toxigenic C. Difficile          Imaging Results (Last 24 Hours)     ** No results found for the last 24 hours. **          Results for orders placed during the hospital encounter of 07/07/20   Adult Transthoracic Echo Complete W/ Cont if Necessary Per Protocol    Narrative · Estimated EF = 50%.  · Left ventricular systolic function is mildly decreased.  · There is calcification of the aortic valve mainly affecting the non   coronary cusp(s).  · Aortic valve maximum pressure gradient is  11.7 mmHg.          I have reviewed the medications:  Scheduled Meds:amLODIPine, 5 mg, Oral, Daily  aspirin, 81 mg, Oral, Daily  DAPTOmycin, 4 mg/kg, Intravenous, Q24H  DULoxetine, 60 mg, Oral, Daily  enoxaparin, 40 mg, Subcutaneous, Q24H  insulin lispro, 0-7 Units, Subcutaneous, TID AC  meropenem, 1 g, Intravenous, Q8H  metoprolol tartrate, 100 mg, Oral, Q12H  micafungin (MYCAMINE) IV, 100 mg, Intravenous, Q24H  nystatin, , Topical, Q12H  saccharomyces boulardii, 250 mg, Oral, BID  sodium chloride, 10 mL, Intravenous, Q12H  terazosin, 10 mg, Oral, Nightly  vancomycin, 125 mg, Oral, Q6H      Continuous Infusions:dextrose 5 % and sodium chloride 0.45 %, 75 mL/hr, Last Rate: 75 mL/hr (10/02/20 1228)      PRN Meds:.•  acetaminophen **OR** acetaminophen **OR** acetaminophen  •  dextrose  •  dextrose  •  glucagon (human recombinant)  •  Lidocaine HCl Urethral/Mucosal 2%  •  ondansetron  •  sodium chloride  •  sodium chloride    Assessment/Plan   Assessment & Plan     Active Hospital Problems    Diagnosis  POA   • AMS (altered mental status) [R41.82]  Unknown   • Acute sepsis (CMS/[facility]) [A41.9]  Yes   • Wound of right leg [S81.801A]  Yes   • History of Clostridioides difficile colitis [Z86.19]  Not Applicable   • Bipolar disorder (CMS/[facility]) [F31.9]  Yes   • Acute UTI (urinary tract infection) [N39.0]  Yes   • Peripheral vascular disease (CMS/[facility]) [I73.9]  Yes   • Type 2 diabetes mellitus with circulatory disorder and uncontrolled [E11.59]  Yes   • Hyperlipemia [E78.5]  Yes   • Essential hypertension [I10]  Yes      Resolved Hospital Problems   No resolved problems to display.        Brief Hospital Course to date:  Kendrick Crystal is a 50 y.o. male with uncontrolled DMII with neuropathy, HLD, HTN, DUNLAP cirrhosis, PVD s/p L BKA, BPH, perirectal abscess in October 2019, and perinephric abscess in October 2019, extensive prostate/periprostatic abscess that underwent surgery on 06/18/20, Hx of c.diff, most recently admitted in  July for sepsis and abd abscesses.      He presented to ED with progressively worsening weakness over past 2 weeks, confusion, more lethargic. UTI found.  Also recent orthopnea, polyuria/pedal edema. He was thought to have AMS due to UTI and initially improved after broad spectrum IV antibiotics. He became more somnolent 10/2 with unclear cause.    Metabolic encephalopathy  -Worsened on 10/2.  Unclear etiology but no worsening infectious symptoms  -ABG within normal limits, ammonia within normal limits, lactate within normal limits  -CT head with no acute changes    UTI  Complex recent history of periprostate abscess, had surg in 6/202, known to ID group  Currently having urinary retention with overflow incontinence   - He has a history of recurrent klebsiella UTI and ecoli urine cultures as well as history of periprostatic abscess, with negative CT A/P on admission  - Have d/w Dr Garcia. Urine cx now with GPR. Continue merrem, mycafungin. Final abx per ID.     Acute on chronic mixed CHF (Ef 50%); noncompliant w home diuretics   Bilateral pleural effusions  Pulmonary edema  Hypoxia  - He had had good output with IV lasix.   -Monitor volume status and start diuretics as needed  - Daily weights, strict I's and O's.      HyperK, resolved  - Probably related due to overdiuresis. Hold diuretics as above.   -Resolved with flow, fluids  - DC fluids.     Hx of C.diff - with intermittent diarrhea  - C diff toxin and PCR neg      RLE neuropathic pain  RLE wounds, PAD, nonambulatory at home.   - Dr. Arriola has seen, felt patient does not need operative mgmt at this time. Recs wound care.  - No pain complaints today. Continue perc TID per home dose; has a pain clinic, may need increased dose.     DM2  - At home he is on lantus 20 q12 with TID log. He is noncompliant with this at home.  - Glucose controlled today. SSI with accuchecks AC/HS     HTN  - Controlled today. Continue amlodipine, lopressor     Bipolar DO, depression -  cont cymbalta, resumed seroquel    DVT prophylaxis: lovenox SQ       Disposition: I expect the patient to be discharged tbd    Addendum: Called by nursing staff this afternoon regarding the patient wanting to leave AMA.  Wife states that she cannot take care of him.  I discussed the risks of patient leaving AMA as he remains on IV antibiotics including worsening infection and even death.  Patient still insistent on going home since I will not discharge him today.  Unfortunately, the patient is incapable of leaving the hospital on his own without significant assistance given his severe debility.  Patient requesting ambulance to go home and I told him that if he wanted to leave AMA he would have to arrange transport himself.  Wife states that he needs 24-hour care and she cannot give it to him.  I discussed placement possibilities.  Wife also requesting APS referral.    CODE STATUS:   Code Status and Medical Interventions:   Ordered at: 09/28/20 0510     Limited Support to NOT Include:    Intubation     Code Status:    No CPR     Medical Interventions (Level of Support Prior to Arrest):    Limited     Comments:    per prior code status orders     This patient's problems and plans were partially entered by my partner and updated as appropriate by me 10/03/20. Today is my first day evaluating this patients active medical problems. I Personally reviewed chart and adjusted note to reflect daily changes in management/clinical condition    More than 50% of time spent on coordination of care with nursing staff/case management/specialists as well as counseling patient/family on current illness/plan of care . Case discussed with: Nursing staff/case management, patient and family  Total time of the encounter was 40 minutes.            Shauna Byrnes MD  10/03/20

## 2020-10-04 NOTE — NURSING NOTE
Patient stated this am that he was leaving today, wife came in saying patient was leaving and had a ride at noon. Instructed them that their is no order for discharge and if they left it would be against medical advice. Wife stated it was not her decision but her husbands. Patient stated he was leaving today because he wanted to go home. Notified Dr. Byrnes. Instructed patient on the risks of him leaving ama, he stated understanding, and signed AMA form. D/c'd TWAN, Walker and tele.

## 2020-10-04 NOTE — PROGRESS NOTES
[facility] Progress Note        Antibiotics:  Anti-Infectives (From admission, onward)    Ordered     Dose/Rate Route Frequency Start Stop    10/02/20 0737  DAPTOmycin (CUBICIN) 400 mg in sodium chloride 0.9 % 50 mL IVPB     Ordering Provider: Usman Dong MD    4 mg/kg × 99.8 kg  100 mL/hr over 30 Minutes Intravenous Every 24 Hours 10/02/20 1100 10/09/20 1059    20 1633  micafungin 100 mg/100 mL 0.9% NS IVPB (mbp)     Ordering Provider: Usman Dong MD    100 mg  over 60 Minutes Intravenous Every 24 Hours 20 1800 10/05/20 1759    20 0531  meropenem (MERREM) 1 g/100 mL 0.9% NS VTB (mbp)     Ordering Provider: Sridevi Xavier PA    1 g  over 3 Hours Intravenous Every 8 Hours Scheduled 20 0800 10/05/20 0759    20 0531  vancomycin oral solution reconstituted 125 mg     Ordering Provider: Sridevi Xavier PA    125 mg Oral Every 6 Hours Scheduled 20 0600 10/05/20 0559          CC:    HPI:  Patient is a 50 y.o.  Yr old male with history of poorly contolled T2DM, DUNLAP/liver cirrhosis, HTN, PVD/Left BKA, and multiple skin abscesses/MRSA who presented to [facility] ED with right shin wound infection summer 2018.  He was unable to get to see Dr. Martinez as his father passed away unexpectedly on 18 and he had to wait until after the . The wound worsened becoming gangrenous and he came to [facility] ED in 2018.  He also had been hospitalized at [facility] and then transferred to  for a severe penis/scrotum infection requiring surgical debridement in summer 2018.  He received antibiotics regarding his right leg infection, generally improved over the remainder of summer 2018 but that area had not completely healed. He was readmitted 2019 with acute left lower abdominal wall redness/swelling and pain, spontaneous drainage associated with fever/chills and uncontrolled blood sugars.   I&D requiring wide debridement , severe/deep infection and  transferred to [facility] on May 3, 2019 with IV Zosyn/oral doxycycline. Cultures had lactobacillus and mixed gram-positive skin sherly including alpha strep, staph epidermidis and corynebacterium. Readmitted to [facility] May 18, 2019, increasing generalized edema ultimately transitioned to oral doxycycline and healed.     Readmitted July 30, 2019 with acute rectal pain that had begun 7/27; this is associated with constipation for days, chills and nausea/dry heaving.  White blood cell count elevated, high hemoglobin A1c over 13, urinalysis with hematuria/pyuria and CT scan with 3 x 3 cm fluid collection anterior to the distal rectum and per discussion with Dr. Akbar/Jennifer, this was not in the prostate.  Colorectal surgery/urology saw him for consideration of surgical options/timing/threshold, etc..     8/2/19 I&Dper Dr Payne perirectal abscess; patient reports that he had instrumented his rectum prior to hospitalization with enema     8/3/19 urinary retention overnight requiring in/out catheterization per patient.  Creat climb     8/4/19 further creat climb; childs placed and lisinopril stopped and d/w Dr Rubio;  ?obstruction initially associated with retention postop (1200 out with I/O cath postop per nursing) -v- contrast from CT -v- lisinopril/medication/vancomycin -v- relative hypotension -v- likely combination of factors with ATN; nephrology following; vancomycin trough high and vancomycin stopped empirically 8/3 although high trough potentially accumulation as a consequence of diminishing renal function associated with retention/contrast/pressure rather than cause.  Nonetheless, opted to avoid at that time; creatinine trending down.      8/7/19 in retrospect he remembers air in his urine at admit which has raised concern for possible fistula from urinary tract;  No fecaluria and culture from abscess is fecal sherly;  ?rectal-urethral fistula;  Dr Payne aware     Culture with E. coli/Klebsiella  "species and creatinine generally trended down, transitioned to oral antibiotics at discharge.     Readmitted August 17, 2019 with nausea/vomiting/dry heaves for 3 days.  Walker catheter was placed and CT scan of the abdomen showed:      \"Previously seen fluid collection identified inferior to the prostate gland is more increased in density on today's examination. There is wall thickening again seen throughout the bladder. Findings again are concerning for cystitis.\" per radiology     He was transitioned to oral omnicef/topical antifungal on approx 8/23/19; Noncompliant with f/u in our office     READMITTED 10/8/19 RLQ abd pain, urinary retention, nausea, dysuria and generalized fatigue     UTI by U/A, subsequent blood cultures positive for  kleb sp, urine with kleb and e coli ;  Abnormal CT abd with right perinephric fluid collection, advanced cirrhosis, urinary bladder thickening.  10/9 Aspirate of perinephric fluid collection consistent with abscess/kleb and white blood cells; 10/16/19 cytoscopy with bullous change per Dr Gutierres but no fistula per him; 10/19/19 intermittent nausea, no vomiting or dry heaves this am, intermittent dry cough broadened to zosyn with ?aspiration pneumonia evolving after dry heaves/vomiting and had intermittent diarrhea, oral vancomycin added empirically with history of prior C. Difficile; improved with IV Zosyn/oral vancomycin and he refused consideration of placement.  He insisted on home, discharged October 23 and noncompliant with outpatient therapy and outpatient follow-up October 25.  He had become fatigued such that his family could not assist him out of bed and it was recommended by my office that he return to the emergency room October 25.  He apparently refused that but did come to the emergency room October 26.     READMITTED October 26, 2019 with generalized weakness, fatigue/malaise and nausea/vomiting/diarrhea.  CT scan revealed increased size of perinephric fluid collection " per radiology. 10/29 drain placed; 11/5/19 drain inadvertently out overnight per him;11/8 voiding cystourethrogram per urology; I d/w Dr Escobedo 11/11 and he reviewed the US from 11/8 and he reports to me US is adequate for followup on this and NO current evidence for abscess, no need for CT scan at that time to evaluate abscess per d/w him and given clinical improvements/radiographic improvements; finished abx prior to discharge 11/14 and noncompliant with physical therapy, he refused placement and insisted on home.     READMITTED 11/18/19 with multifactorial complaints.  He reported generalized fatigue and inability to move himself around, increased dyspnea with dry cough and dry heaves, urinary frequency/retention and small amounts of urination; noted to have leukocytosis, elevated troponin/BNP, with hematuria/pyuria and bacteriuria/small yeast; chest x-ray with increased pulmonary vascularity bilaterally and ill-defined opacification left lung base with small left pleural effusion per radiology.  CT abdomen with large pleural effusions with dependent airspace disease, likely atelectasis per radiology and limited ability to evaluate for right sided perinephric abscess per radiology     11/22 CT chest no focal consolidation per radiology; 11/23/19 JUAN after diuresis; later in November he was discharged with oral antibiotics but noncompliant with follow-up.     Readmitted June 7, 2020 with inability to urinate, dysuria and nausea/dry heaves with subjective fever.  Had severe leukocytosis with acute pyuria/hematuria concerning for UTI and subsequent urinary culture with gram-negative lon.  CT scan of the abdomen/pelvis with concern for acute suppurative prostatitis and possible prostatic abscess per radiology in addition to soft tissue thickening extending to the left lower pelvic sidewall.  No evidence for urinary tract obstruction with indwelling Walker catheter.  No specific mention of perinephric collection and  "no specific focal bowel abnormality described per radiology     He had inability to urinate with hesitancy and dysuria.       6/11 flex sig by Dr Payne     6/18/20 surgery by Dr Paez:  \"PROCEDURE:  Cystoscopy with transurethral resection of prostatic abscess and prostate obtaining tissue culture and permanent pathology.\"     Readmitted July 7, 2020; Per Dr Garza Consult: Prolonged hospital course with numerous complications.  He was followed by Dr. Dong during most of the hospital stay.  He had a prostate abscess which was resected on 6/18.  Culture is polymicrobial with Klebsiella, Candida glabrata and lactobacillus.  He was treated with IV Unasyn, Flagyl, micafungin, also oral vancomycin due to history of C. Difficile.  Eventually he was deemed appropriate for transfer to skilled nursing facility but patient refused placement.  Medicare would no longer pay for his hospital admission so patient requested discharge home.  He left the hospital on 7/3 on oral antibiotics: Augmentin and oral vancomycin, with plans to discuss transition to hospice as an outpatient given his overall poor prognosis and reluctance to comply with recommendations.  He was admitted to [facility] on 7/7 where a CTA of the chest was negative for PE but had severe  bilateral atelectasis and bilateral pleural effusions.  CT of the abdomen was concerning for multiloculated pelvic abscesses and possibly splenic microabscesses. Received polymicrobial coverage, moved out of ICU with pigtail drain in place regarding pleural effusion and eventually had repeat imaging. 7/16 CT with no abscess per radiology; 7/23/20  Noncompliance continues, refusing placement, refusing oral vancomycin and general noncompliant behavior; he eventually was tapered to ceftriaxone/Mycamine and oral vancomycin solution, transitioned to facility.  In August he was D escalated to oral Omnicef/vancomycin solution and seen August 17 with general clinical " improvements.  Noncompliant with 2 subsequent outpatient visits.     Readmitted to [facility] on September 28, 2020; he had reported feeling fatigued and generally not feeling well, reports by family  Of confusion although at the time of my consultation he is oriented to person/place/year,, family and situation.  He has had urinary frequency with dysuria, diarrhea intermittently although he is unable to quantify, and subjective fever.  He is placed on empiric antimicrobials. Noted to have bilateral pleural effusions with pulmonary edema,  Hematuria/pyuria with concern for recurrent UTI     10/3/20  Dr Byrnes's note seen, patient threatening to leave AMA but family unable to care for him at home    10/4/20 Seen early and sleepy; nursing reports noncompliance  In general;  No diarrhea. No flank pain.   denies cough or hemoptysis.  No rash.  No vomiting.  No abdominal pain.  He has chronic wounds at the right lower leg but no new redness/swelling there and no new purulent drainage/malodor.no headache photophobia or neck stiffness.       No other particular exposures    ROS:      10/4/20 No f/c/s. No n/v/d. No rash. No new ADR to Abx.     No rash. No new ADR to Abx.      Constitutional-- No chills or sweats.  Appetite diminished with fatigue   Heent-- No new vision, hearing or throat complaints.  No epistaxis or oral sores.  Denies odynophagia or dysphagia.  No flashers, floaters or eye pain. No odynophagia or dysphagia. No headache, photophobia or neck stiffness.  CV-- No chest pain, palpitation or syncope  Resp-- as above  GI-  No hematochezia, melena, or hematemesis. Denies jaundice or chronic liver disease.  --as above  Lymph- no swollen lymph nodes in neck/axilla or groin.   Heme- No active bruising or bleeding; no Hx of DVT or PE.  MS-- no swelling or pain in the bones or joints of arms/legs aside from above.  No new back pain.  Neuro-- No acute focal weakness or numbness in the arms or legs.  No  "seizures.     Full 12 point review of systems reviewed and negative otherwise for acute complaints, except for above       PE:   /89   Pulse 70   Temp 97.8 °F (36.6 °C) (Oral)   Resp 28   Ht 190.5 cm (75\")   Wt 99.8 kg (220 lb)   SpO2 95%   BMI 27.50 kg/m²     GENERAL: sleepy,  in no acute distress.  Chronically ill  HEENT: Normocephalic, atraumatic.  PERRL. EOMI. No conjunctival injection. No icterus. Oropharynx clear without evidence of thrush or exudate. No evidence of peridontal disease.    NECK: Supple without nuchal rigidity. No mass.  LYMPH: No cervical, axillary or inguinal lymphadenopathy.  HEART: RRR; No murmur, rubs, gallops.   LUNGS: Diminished at bases to auscultation bilaterally without wheezing, rales, rhonchi. Normal respiratory effort. Nonlabored. No dullness.  ABDOMEN: Soft, non tender, nondistended. Positive bowel sounds. No rebound or guarding. NO mass or HSM.   vague erythema and satellite lesions in the skin fold consistent with cutaneous candidiasis  EXT:  No cyanosis, clubbing or edema. No cord.  MSK: FROM without joint effusions noted arms/legs.    SKIN: Warm and dry without cutaneous eruptions on Inspection/palpation.    NEURO: sleepy     No peripheral stigmata/phenomena of endocarditis     Right lower leg with clean wounds through the dermis to the soft tissue but no probe to bone tendon joint or ligament.  No purulence and no surrounding redness induration or warmth.  No mass bulge or fluctuance.     Left amputation site with no open wound or active drainage.  No redness induration or warmth.  No mass bulge or fluctuance.  No crepitus or bulla.       Laboratory Data    Results from last 7 days   Lab Units 10/03/20  0426 10/02/20  0910 09/29/20  0442   WBC 10*3/mm3 9.31 7.86 8.24   HEMOGLOBIN g/dL 9.2* 9.2* 8.8*   HEMATOCRIT % 30.5* 30.3* 29.7*   PLATELETS 10*3/mm3 287 284 280     Results from last 7 days   Lab Units 10/03/20  0426   SODIUM mmol/L 135*   POTASSIUM mmol/L 4.7 "   CHLORIDE mmol/L 105   CO2 mmol/L 22.0   BUN mg/dL 37*   CREATININE mg/dL 0.60*   GLUCOSE mg/dL 114*   CALCIUM mg/dL 8.6     Results from last 7 days   Lab Units 10/02/20  0910   ALK PHOS U/L 205*   BILIRUBIN mg/dL 0.3   ALT (SGPT) U/L 10   AST (SGOT) U/L 21               Estimated Creatinine Clearance: 203.3 mL/min (A) (by C-G formula based on SCr of 0.6 mg/dL (L)).      Microbiology:      Radiology:  Imaging Results (Last 72 Hours)     ** No results found for the last 72 hours. **            Impression:     --acute pyuria/hematuria with symptomatology concerning for recurrent UTI.   Empiric antimicrobials ongoing.  Further adjustments to depend on clinical course a study results and response to therapy; culture mixed     --acute confusion/encephalopathy.  Better by the time of my consultation and oriented.  Further neurologic workup at discretion of internal medicine including subspecialty evaluation with neurology as they see fit.  No meningismus.  Present     --Bilateral pleural effusion/pulmonary edema per admission note.  Pneumonia less likely at present      --cutaneous candidiasis.  Mycamine added     --History multiple pelvic abscesses near prostate in addition to possible splenic microabscesses versus infarct by prior outside hospital CT scan July 2020; prior partial prostatectomy  With concern for chronic prostatitis given multiple past admissions with Klebsiella. repeat CT scan July 16, 2020 with no abscess per radiology.  Lengthy antimicrobial therapy between July/August and CT scan in September 2020 of the description of abscess by radiology. Monitor for potential relapse     --History C. Difficile with intermittent diarrhea although C. Difficile PCR negative most recently.  Continue oral vancomycin solution for now with high risk for relapse     --History MRSA previously; leg stable with no active soft tissue infection at present.     --history left BKA.     --Peripheral vascular  disease     --Diabetes.  Previously uncontrolled     --Medical noncompliance     --possible cirrhosis per imaging per radiology.  Further workup or referral at discretion of medicine team    PLAN:     --Merrem I.V./Mycamine/dasptomycin IV     --Vancomycin oral solution     --History per family and nursing staff     --Highly complex set of issues with high risk for further serious morbidity and other serious sequela     --Discussed with microbiology     --Check/review labs cultures and scans     --his medical noncompliance remains a significant barrier to care.  Family aware      --COVID negative from 9/29    --d/w Dr Caro Dong MD  10/4/2020

## 2020-10-04 NOTE — PLAN OF CARE
PT is more cooperative with nursing care tonight. took all HS PO meds. Bedding/gown have been changed. Perineum care done. SBP moderately elevated. SR on cardiac mx. Will cont to mx. Call light in reach.

## 2020-10-04 NOTE — DISCHARGE SUMMARY
"    [facility] Medicine Services  ELOPEMENT AGAINST MEDICAL ADVICE    Patient Name: Kendrick Crystal  : 1970  MRN: [id number]    Date of Admission: 2020  Date of Elopement: 10/4/2020  Primary Care Physician: Stanley Carrasco DO    Consults     Date and Time Order Name Status Description    2020 1307 Inpatient Cardiothoracic Surgery Consult      2020 0757 Inpatient Infectious Diseases Consult Completed     2020 0550 Inpatient Infectious Diseases Consult Completed         Hospital Course     Presenting Problem:   Acute sepsis (CMS/HCC) [A41.9]  Acute sepsis (CMS/HCC) [A41.9]    Active Hospital Problems    Diagnosis  POA   • AMS (altered mental status) [R41.82]  Unknown   • Acute sepsis (CMS/HCC) [A41.9]  Yes   • Wound of right leg [S81.801A]  Yes   • History of Clostridioides difficile colitis [Z86.19]  Not Applicable   • Bipolar disorder (CMS/[facility]) [F31.9]  Yes   • Acute UTI (urinary tract infection) [N39.0]  Yes   • Peripheral vascular disease (CMS/[facility]) [I73.9]  Yes   • Type 2 diabetes mellitus with circulatory disorder and uncontrolled [E11.59]  Yes   • Hyperlipemia [E78.5]  Yes   • Essential hypertension [I10]  Yes      Resolved Hospital Problems   No resolved problems to display.          Hospital Course:  Kendrick Crystal is a 50 y.o. male history of poorly contolled T2DM, DUNLAP/liver cirrhosis, HTN, PVD/Left BKA, and multiple skin abscesses/MRSA, history of C. Difficile, pleural effusions who presents to the ED from home for confusion and \"not feeling well\".  Per wife at bedside, patient became sleepy and complaining of just not feeling well today.     Pt was recently hospitalized from  -  for acute respiratory faiure requiring HFNC and requiring a chest tube. Pt does have a history of diastolic heart failure and medical noncompliance.  Pt has also had perirectal abscesses requiring prolonged IV antibiotics and a TURP.  Patient with history of " noncompliance.    In the ED, pt VS are stable and labs are stable, however, UA does show WBC TNTC, with 3 plus leukocytes; prior urine cultures have been positive for Klebsiella and E.coli. He was started on Rocephin.    ID was consulted and he was started on meropenem and micafungin for UTI.    Long discussion with wife and she is unable to care for him at home.  Patient decided he was going to leave AMA and arranged self-pay medical transport to leave AMA.  I have discussed the risk of not continuing antibiotics including worsening infection, sepsis and even death.    **Patient left AMA prior to completion of evaluation and management**      Day of Discharge     HPI:   **Patient left AMA prior to completion of evaluation and management**    Vital Signs:   Temp:  [96.9 °F (36.1 °C)-98.2 °F (36.8 °C)] 98.2 °F (36.8 °C)  Heart Rate:  [69-87] 75  Resp:  [14-28] 16  BP: (156-185)/() 163/86       Discharge Details     Discharge Disposition:  **Patient left AMA prior to completion of evaluation and management, therefore discharge planning remains incomplete including absence of any needed discharging medications, testing arrangements or follow up unless otherwise specified**    No future appointments.      Shauna Byrnes MD  10/04/20

## 2020-10-06 NOTE — TELEPHONE ENCOUNTER
PT'S WIFE CALLED TO REQUEST ORDERS FOR A TRAPEZE BAR FOR THE PT'S HOSPITAL BED TO BE FAXED TO [facility] IN [place].     ELIF'S CONTACT [phone]     [facility] [place] [phone]    PLEASE ADVISE

## 2020-10-06 NOTE — PLAN OF CARE
Problem: Patient Care Overview (Adult)  Goal: Plan of Care Review  Outcome: Ongoing (interventions implemented as appropriate)  Goal: Discharge Needs Assessment  Outcome: Ongoing (interventions implemented as appropriate)       04-Oct-2020 23:32

## 2020-10-07 NOTE — TELEPHONE ENCOUNTER
Will they accept a verbal order? There is no order for this DME in Epic. Otherwise I can write a handwritten rx

## 2020-10-12 NOTE — TELEPHONE ENCOUNTER
Home health can't accept the order from hospitalist because he left AMA.  Home health needing to know what to do.    Spoke with Dr Carrasco and he states that he will place the order for home health. Told HH nurse this and she states that they will wait for order to come through on Epic

## 2020-10-13 NOTE — TELEPHONE ENCOUNTER
Caller: ELIF KEENAN    Relationship to patient: Emergency Contact    Best call back number: [phone]    Chief complaint: COUGH    Type of visit: OFFICE VISIT    Requested date: ASAP    If rescheduling, when is the original appointment:     Additional notes: Patient was diagnosed with Pneumonia in July and is still having issues keeping his breath.

## 2020-10-13 NOTE — TELEPHONE ENCOUNTER
Caller: REEELIF    Relationship: Emergency Contact    Best call back number: [phone]    What medication are you requesting: cough syrup     What are your current symptoms: cough, wheezing, not able to get good air in his lungs    How long have you been experiencing symptoms: for a good while    Have you had these symptoms before:    [x] Yes  [] No    Have you been treated for these symptoms before:   [x] Yes  [] No    If a prescription is needed, what is your preferred pharmacy and phone number: [facility]. [place], [place] - [facility] [address] - [phone] [facility] [phone]      Additional notes:

## 2020-10-13 NOTE — TELEPHONE ENCOUNTER
On review of his record I don't feel comfortable ordering home health for him. I recommend he go to the ER for continued medical treatment

## 2020-10-13 NOTE — TELEPHONE ENCOUNTER
Given his AMA elopement from the hospital I do not feel comfortable prescribing him meds and I recommend he go to the ER for continued medical treatment

## 2020-10-14 NOTE — TELEPHONE ENCOUNTER
Patient wife contacted, TWYLA reviewed. Advised her that the patient needs to report back to the ER since he left AMA last time. She verbalized understanding and did not have any further questions.

## 2020-10-26 NOTE — TELEPHONE ENCOUNTER
Patient wife called in and said that you told them to call if patient was having any problems. She said that there is a wound and blisters on the pt's right leg that has been draining. Swelling present in right leg and foot. She also said that the 4th & 5th toes on the right foot are starting to turn black.     They contacted PCP about this and they advised patient go to ED. Patient does not want to go to [facility] in [place], he wants to know what you would like to do.     Would you like to see this patient soon? If so, when?    Please advise.

## 2020-11-02 NOTE — ED PROVIDER NOTES
Subjective   Patient is a 50-year-old male who presents emergency room today with complaints of increased fluid retention causing shortness of breath and increased tightness in his left lower extremity.  Patient states that he has a home health nurse that checks on him who shared with him today that she believes she has increased fluid around his heart and in his lungs and is retaining fluid in his legs.  He denies anything specific that makes his symptoms better or worse.  He shares that he has had to have fluid drained off his lungs in the past.  Reports some nauseousness but denies any additional associated symptoms.  Patient states that he is on a diuretic but is unaware of the name and strength of the diuretic he takes.          Review of Systems   Constitutional: Negative for chills and fever.   HENT: Negative.    Respiratory: Positive for shortness of breath. Negative for cough and chest tightness.    Cardiovascular: Positive for leg swelling. Negative for chest pain.   Gastrointestinal: Negative.    Genitourinary: Negative.    Musculoskeletal: Negative.    Skin: Negative.    Neurological: Negative.    Psychiatric/Behavioral: Negative.    All other systems reviewed and are negative.      Past Medical History:   Diagnosis Date   • Abdominal wall cellulitis 5/20/2019   • JUAN (acute kidney injury) (CMS/HCC) 7/29/2018   • Arthritis    • Bipolar 1 disorder (CMS/[facility])    • Cellulitis of right anterior lower leg 07/29/2018    WITH MRSA   • Chronic kidney disease, stage 3    • Cirrhosis (CMS/HCC)    • Counseling for insulin pump     removed   • Depression    • Diabetes mellitus (CMS/HCC)    • Encephalopathy, hepatic (CMS/[facility])    • H/O degenerative disc disease    • History of Lissa's gangrene     right leg/testicle   • Hyperlipemia    • Hypertension    • MRSA infection    • multiple Skin abscesses    • DUNLAP, bx showed stage IV fibrosis    • Neuropathy    • Peripheral vascular disease (CMS/HCC)    • Thrombophlebitis         No Known Allergies    Past Surgical History:   Procedure Laterality Date   • ABDOMINAL WALL ABSCESS INCISION AND DRAINAGE N/A 4/26/2019    Procedure: ABDOMINAL WALL DEBRIDEMENT;  Surgeon: Fadi Kern MD;  Location:  [facility];  Service: General   • AMPUTATION DIGIT Left 1/30/2017    Procedure: left fourth and fifth transmetatarsal toe amputation ;  Surgeon: Juancho Martinez MD;  Location: [facility];  Service:    • BELOW KNEE AMPUTATION Left 3/2/2017    Procedure: AMPUTATION BELOW KNEE, SHMUEL;  Surgeon: Juancho Martinez MD;  Location: [facility];  Service:    • CYSTOSCOPY N/A 10/16/2019    Procedure: CYSTOSCOPY;  Surgeon: Brad Gutierres MD;  Location: [facility];  Service: Urology   • CYSTOSCOPY TRANSURETHRAL RESECTION OF PROSTATE N/A 6/18/2020    Procedure: CYSTOSCOPY TRANSURETHRAL RESECTION OF PROSTATIC ABCESS;  Surgeon: Mumtaz Paez MD;  Location: [facility];  Service: Urology;  Laterality: N/A;   • ENDOSCOPY     • HEMORRHOIDECTOMY N/A 8/2/2019    Procedure: EXCISION AND DRAIN PERIRECTAL ABSCESS;  Surgeon: Mumtaz Payne MD;  Location: [facility];  Service: General   • INCISION AND DRAINAGE LEG Right 5/27/2020    Procedure: LOWER EXTREMITY DEBRIDEMENT RIGHT;  Surgeon: Juancho Martinez MD;  Location: [facility];  Service: General;  Laterality: Right;   • LEG SURGERY     • SIGMOIDOSCOPY N/A 6/11/2020    Procedure: SIGMOIDOSCOPY FLEXIBLE;  Surgeon: Mumtaz Payne MD;  Location:  [facility];  Service: General;  Laterality: N/A;   • TESTICLE SURGERY Right     DEBRIDEMENT FROM GANGRENE   • TONSILLECTOMY  1975       Family History   Problem Relation Age of Onset   • Arthritis Mother    • Hypertension Mother    • Kidney disease Mother    • Stroke Mother    • Heart attack Father    • Arthritis Father    • Diabetes Father    • Hyperlipidemia Father    • Hypertension Father    • Mental illness Sister    • Diabetes Brother    • Hypertension Brother    • Obesity Brother        Social History      Socioeconomic History   • Marital status:      Spouse name: Not on file   • Number of children: 1   • Years of education: Not on file   • Highest education level: Not on file   Occupational History   • Occupation: Security     Comment: Disabled-Diabetic Retinopathy   Tobacco Use   • Smoking status: Former Smoker     Years: 10.00     Types: Cigars     Quit date: 2018     Years since quittin.0   • Smokeless tobacco: Never Used   • Tobacco comment: 4 cigars a year   Substance and Sexual Activity   • Alcohol use: No     Frequency: Never   • Drug use: No   • Sexual activity: Defer   Social History Narrative    Lives in [facility] with spouse. Has caregiver daily           Objective   Physical Exam  Vitals signs and nursing note reviewed.   Constitutional:       General: He is not in acute distress.     Appearance: Normal appearance. He is not ill-appearing or toxic-appearing.   HENT:      Head: Normocephalic and atraumatic.   Eyes:      General: No scleral icterus.     Conjunctiva/sclera: Conjunctivae normal.   Neck:      Musculoskeletal: Normal range of motion and neck supple.   Cardiovascular:      Rate and Rhythm: Normal rate and regular rhythm.   Pulmonary:      Effort: Pulmonary effort is normal. No respiratory distress.      Breath sounds: Examination of the left-lower field reveals decreased breath sounds. Decreased breath sounds present.   Chest:      Chest wall: No tenderness.   Abdominal:      General: There is no distension.      Palpations: Abdomen is soft.      Tenderness: There is no abdominal tenderness.   Musculoskeletal: Normal range of motion.         General: No deformity.   Skin:     General: Skin is warm and dry.   Neurological:      General: No focal deficit present.      Mental Status: He is alert.   Psychiatric:         Behavior: Behavior normal.         Thought Content: Thought content normal.         Judgment: Judgment normal.         Procedures           ED Course  ED  Course as of Nov 02 1347   Mon Nov 02, 2020   1318 Hospitalist was paged for admission for CHF exacerbation.  In the time between requesting admission, notified by nursing that patient had eloped.    [JG]      ED Course User Index  [JG] Bernardo Guevara PA      Recent Results (from the past 24 hour(s))   Comprehensive Metabolic Panel    Collection Time: 11/02/20 11:05 AM    Specimen: Blood   Result Value Ref Range    Glucose 149 (H) 65 - 99 mg/dL    BUN 31 (H) 6 - 20 mg/dL    Creatinine 0.50 (L) 0.76 - 1.27 mg/dL    Sodium 135 (L) 136 - 145 mmol/L    Potassium 5.1 3.5 - 5.2 mmol/L    Chloride 106 98 - 107 mmol/L    CO2 22.0 22.0 - 29.0 mmol/L    Calcium 9.1 8.6 - 10.5 mg/dL    Total Protein 6.9 6.0 - 8.5 g/dL    Albumin 3.40 (L) 3.50 - 5.20 g/dL    ALT (SGPT) 12 1 - 41 U/L    AST (SGOT) 18 1 - 40 U/L    Alkaline Phosphatase 234 (H) 39 - 117 U/L    Total Bilirubin 0.3 0.0 - 1.2 mg/dL    eGFR Non African Amer >150 >60 mL/min/1.73    Globulin 3.5 gm/dL    A/G Ratio 1.0 g/dL    BUN/Creatinine Ratio 62.0 (H) 7.0 - 25.0    Anion Gap 7.0 5.0 - 15.0 mmol/L   BNP    Collection Time: 11/02/20 11:05 AM    Specimen: Blood   Result Value Ref Range    proBNP 11,229.0 (H) 0.0 - 900.0 pg/mL   Troponin    Collection Time: 11/02/20 11:05 AM    Specimen: Blood   Result Value Ref Range    Troponin T 0.190 (C) 0.000 - 0.030 ng/mL   Light Blue Top    Collection Time: 11/02/20 11:05 AM   Result Value Ref Range    Extra Tube hold for add-on    Green Top (Gel)    Collection Time: 11/02/20 11:05 AM   Result Value Ref Range    Extra Tube Hold for add-ons.    Lavender Top    Collection Time: 11/02/20 11:05 AM   Result Value Ref Range    Extra Tube hold for add-on    Gold Top - SST    Collection Time: 11/02/20 11:05 AM   Result Value Ref Range    Extra Tube Hold for add-ons.    CBC Auto Differential    Collection Time: 11/02/20 11:05 AM    Specimen: Blood   Result Value Ref Range    WBC 8.78 3.40 - 10.80 10*3/mm3    RBC 3.83 (L) 4.14 - 5.80  10*6/mm3    Hemoglobin 9.1 (L) 13.0 - 17.7 g/dL    Hematocrit 30.8 (L) 37.5 - 51.0 %    MCV 80.4 79.0 - 97.0 fL    MCH 23.8 (L) 26.6 - 33.0 pg    MCHC 29.5 (L) 31.5 - 35.7 g/dL    RDW 16.8 (H) 12.3 - 15.4 %    RDW-SD 48.3 37.0 - 54.0 fl    MPV 10.3 6.0 - 12.0 fL    Platelets 339 140 - 450 10*3/mm3    Neutrophil % 73.7 42.7 - 76.0 %    Lymphocyte % 14.5 (L) 19.6 - 45.3 %    Monocyte % 7.1 5.0 - 12.0 %    Eosinophil % 3.6 0.3 - 6.2 %    Basophil % 0.9 0.0 - 1.5 %    Immature Grans % 0.2 0.0 - 0.5 %    Neutrophils, Absolute 6.47 1.70 - 7.00 10*3/mm3    Lymphocytes, Absolute 1.27 0.70 - 3.10 10*3/mm3    Monocytes, Absolute 0.62 0.10 - 0.90 10*3/mm3    Eosinophils, Absolute 0.32 0.00 - 0.40 10*3/mm3    Basophils, Absolute 0.08 0.00 - 0.20 10*3/mm3    Immature Grans, Absolute 0.02 0.00 - 0.05 10*3/mm3    nRBC 0.0 0.0 - 0.2 /100 WBC     Note: In addition to lab results from this visit, the labs listed above may include labs taken at another facility or during a different encounter within the last 24 hours. Please correlate lab times with ED admission and discharge times for further clarification of the services performed during this visit.    XR Chest 1 View   Final Result   Mild cardiogenic pulmonary edema with small left pleural   effusion.       This report was finalized on 11/2/2020 12:16 PM by Brad Weiss.            Vitals:    11/02/20 1200 11/02/20 1215 11/02/20 1230 11/02/20 1245   BP: 123/93 106/88 159/82 170/96   BP Location:       Patient Position:       Pulse:       Resp:       Temp:       TempSrc:       SpO2: 90% 94% 92% 93%   Weight:       Height:         Medications   sodium chloride 0.9 % flush 10 mL (has no administration in time range)     ECG/EMG Results (last 24 hours)     Procedure Component Value Units Date/Time    ECG 12 Lead [[id number]] Collected: 11/02/20 1055     Updated: 11/02/20 1125        ECG 12 Lead         ECG 12 Lead    (Results Pending)                                           MDM  Number of Diagnoses or Management Options  Acute on chronic congestive heart failure, unspecified heart failure type (CMS/HCC): new and requires workup     Amount and/or Complexity of Data Reviewed  Clinical lab tests: reviewed  Tests in the radiology section of CPT®: reviewed  Tests in the medicine section of CPT®: reviewed  Decide to obtain previous medical records or to obtain history from someone other than the patient: yes        Final diagnoses:   Acute on chronic congestive heart failure, unspecified heart failure type (CMS/HCC)            Bernardo Guevara PA  11/02/20 1560

## 2020-11-06 NOTE — OUTREACH NOTE
"Patient Outreach Note    Called patient, wife answered the phone and talked.  Recent ED 11-2-20 with shortness of breath/ A/C CHF; patient left ED AMA.  Wife stated patient \"is not doing good\", he is wearing his oxygen at all times.  Reported he is urinating a lot; is not drinking a lot.  RN-ACM asked about patient's daily medications.  Wife stated some days he will take his medications, and some days he will not.  She said she cannot make him do what he does not want to do. Reported patient is not getting Home Health at this time due to patient's non-compliance. Said there is a Palliative Care Nurse that comes at least once a month, if not more often, and calls regularly to check on patient; according to wife, Palliative assists patient with pain medication; there has been discussion of Hospice for patient.  Wife said she believes if patient was with Hospice, she could get more help with his care.  Wife stated she is the caregiver for patient, for ADL's, Medications, dressing changes to RLE wound.  Patient remains totally dependent for mobility, and stays in bed mostly.  Patient does still get an Aide through Medicaid Waiver, 40 hours/week; this male Aide does use the Lyndsey lift when needed.  RN-ACM discussed importance of good nutrition and hydration; of keeping skin clean and dry; of daily compliance with medications.   RN-ACM discussed with wife importance of medical care for patient; for a follow up as soon as possible with PCP, Dr. XUAN Carrasco, whether in person or by phone or video.  Wife said she would call and make an appt.       Laquita Guadarrama RN  Ambulatory     11/6/2020, 15:47 EST      "

## 2020-11-15 PROBLEM — I50.9 ACUTE EXACERBATION OF CHF (CONGESTIVE HEART FAILURE) (HCC): Status: ACTIVE | Noted: 2020-01-01

## 2020-11-15 NOTE — NURSING NOTE
"Multiple complaints from patient and wife were reported to this CRN via bedside RN.  Upon entering the room the patient is sitting in the bed and the his wife is sitting in the chair, both looking angry/frustrated.  The patient begins to tell me that he wants to leave AMA and needs to borrow some of the hospital equipment to get home because \"we lost his $120 sling\".  After talking with the patient and wife for sometime they agreed to go home and see hospice in the morning as they had already scheduled.  The patient was dressed, childs and IV's removed, brief placed, and lifted into his motorized wheelchair.  The patient was signed out AMA and left with his wife.  "

## 2020-11-15 NOTE — PROGRESS NOTES
Clinical Nutrition   Reason For Visit: Identified at risk by screening criteria, Nonhealing wound or pressure ulcer    Patient Name: Kendrick Crystal  YOB: 1970  MRN: [id number]  Date of Encounter: 11/15/20 12:16 EST  Admission date: 11/15/2020      Nutrition Assessment     Admission Problem List:  CHF exacerbation  RLE cellulitis  Open ulceration to lower lateral left ankle (per [facility] RN eval note 11/15)      Applicable PMH:  HTN, HLD  T2DM  CKD stage 3  Bipolar  DUNLAP cirrhosis  Wheelchair bound  Medical non-compliance  S/p left AKA      Applicable medical tests/procedures since admission:       Reported/Observed/Food/Nutrition Related History   RD familiar with patient from previous [facility] admission. RD notes patient has received chocolate Premier Protein during previous admissions, but kitchen currently out of this flavor. Will send chocolate Ensure HP instead.    Anthropometrics   Height: 75 in  Weight: 307 lbs (no wt method 11/15 per nsg doc)  BMI: N/A (Left AKA)    Labs reviewed   Labs reviewed: Yes    Medications reviewed   Medications reviewed: Yes  Pertinent: antibiotic    Current Nutrition Prescription   PO: Diet Regular; Consistent Carbohydrate, Daily Fluid Restriction; 1500 mL Fluid Per Day     Evaluation of Received Nutrient/Fluid Intake: insufficient data    Nutrition Diagnosis     Problem Increased nutrient needs (protein)   Etiology Skin integrity   Signs/Symptoms Open ulceration to lower lateral left ankle     Intervention   Intervention: Follow treatment progress, Care plan reviewed, Supplement provided     -Ordered Ensure High Protein (chocolate) with meals.    Goal:   General: Nutrition support treatment  PO: Establish PO     Monitoring/Evaluation:   Monitoring/Evaluation: Per protocol, PO intake, Supplement intake, Pertinent labs, Skin status    Mary Cao RD  Time Spent: 15 min

## 2020-11-15 NOTE — H&P
[facility] Medicine Services  HISTORY AND PHYSICAL    Patient Name: Kendrick Crystal  : 1970  MRN: [id number]  Primary Care Physician: Stanley Carrasco DO  Date of admission: 11/15/2020      Subjective   Subjective     Chief Complaint:  Sent from outside hospital for CHF    HPI:  Kendrick Crystal is a 50 y.o. male with chronically uncontrolled diabetes, Johnston cirrhosis, PVD with prior left BKA, prior skin abscess with MRSA, prior C. difficile, who presents in transfer from [facility].  He was seen in our ER on 10/4/2020 and supposed to be admitted however left the ER AMA, scheduling his own ambulance transfer/self-pay.  Since that time he has progressively declined, with progressively increased shortness of breath, orthopnea, lower extremity edema.  He presented to the outside hospital with shortness of breath and a chronic right lower extremity wound that has been weeping purulent drainage.  His wife reports that palliative care has started him on antibiotics outpatient which have not helped.  The hospitalist at the outside hospital refused admission due to the patient's history of PVD and so admission was sought here.  Shortly after arriving he is already asking to leave AMA but his wife is refusing to take him home.  He denies fever, chill, excessive fatigue, change in taste or smell.    Current COVID Risks are:  [] Fever []  Cough [x] Shortness of breath [] Fatigue [] Change in taste or smell    [] Exposure to COVID positive patient  [] High risk facility   []  NONE    Review of Systems   Gen- No fevers, chills  CV- No chest pain, palpitations  Resp- No cough, yes dyspnea  GI- No N/V/D, abd pain  All other systems reviewed and are negative.     Personal History     Past Medical History:   Diagnosis Date   • Abdominal wall cellulitis 2019   • JUAN (acute kidney injury) (CMS/[facility]) 2018   • Arthritis    • Bipolar 1 disorder (CMS/[facility])    • Cellulitis of right  anterior lower leg 07/29/2018    WITH MRSA   • Chronic kidney disease, stage 3    • Cirrhosis (CMS/HCC)    • Counseling for insulin pump     removed   • Depression    • Diabetes mellitus (CMS/HCC)    • Encephalopathy, hepatic (CMS/HCC)    • H/O degenerative disc disease    • History of Lissa's gangrene     right leg/testicle   • Hyperlipemia    • Hypertension    • MRSA infection    • multiple Skin abscesses    • DUNLAP, bx showed stage IV fibrosis    • Neuropathy    • Peripheral vascular disease (CMS/HCC)    • Thrombophlebitis        Past Surgical History:   Procedure Laterality Date   • ABDOMINAL WALL ABSCESS INCISION AND DRAINAGE N/A 4/26/2019    Procedure: ABDOMINAL WALL DEBRIDEMENT;  Surgeon: Fadi Kern MD;  Location:  [facility];  Service: General   • AMPUTATION DIGIT Left 1/30/2017    Procedure: left fourth and fifth transmetatarsal toe amputation ;  Surgeon: Juancho Martinez MD;  Location:  [facility];  Service:    • BELOW KNEE AMPUTATION Left 3/2/2017    Procedure: AMPUTATION BELOW KNEE, SHMUEL;  Surgeon: Juancho Martinez MD;  Location:  [facility];  Service:    • CYSTOSCOPY N/A 10/16/2019    Procedure: CYSTOSCOPY;  Surgeon: Brad Gutierres MD;  Location:  [facility];  Service: Urology   • CYSTOSCOPY TRANSURETHRAL RESECTION OF PROSTATE N/A 6/18/2020    Procedure: CYSTOSCOPY TRANSURETHRAL RESECTION OF PROSTATIC ABCESS;  Surgeon: Mumtaz Paez MD;  Location:  [facility];  Service: Urology;  Laterality: N/A;   • ENDOSCOPY     • HEMORRHOIDECTOMY N/A 8/2/2019    Procedure: EXCISION AND DRAIN PERIRECTAL ABSCESS;  Surgeon: Mumtaz Payne MD;  Location:  [facility];  Service: General   • INCISION AND DRAINAGE LEG Right 5/27/2020    Procedure: LOWER EXTREMITY DEBRIDEMENT RIGHT;  Surgeon: Juancho Martinez MD;  Location:  [facility];  Service: General;  Laterality: Right;   • LEG SURGERY     • SIGMOIDOSCOPY N/A 6/11/2020    Procedure: SIGMOIDOSCOPY FLEXIBLE;  Surgeon: Mumtaz Payne MD;  Location:  [facility];   Service: General;  Laterality: N/A;   • TESTICLE SURGERY Right     DEBRIDEMENT FROM GANGRENE   • TONSILLECTOMY  1975       Family History: family history includes Arthritis in his father and mother; Diabetes in his brother and father; Heart attack in his father; Hyperlipidemia in his father; Hypertension in his brother, father, and mother; Kidney disease in his mother; Mental illness in his sister; Obesity in his brother; Stroke in his mother. Otherwise pertinent FHx was reviewed and unremarkable.     Social History:  reports that he quit smoking about 2 years ago. His smoking use included cigars. He quit after 10.00 years of use. He has never used smokeless tobacco. He reports that he does not drink alcohol or use drugs.  Social History     Social History Narrative    Lives in [facility] with spouse. Has caregiver daily       Medications:  Available home medication information reviewed.  Medications Prior to Admission   Medication Sig Dispense Refill Last Dose   • amLODIPine (NORVASC) 10 MG tablet Take 1 tablet by mouth Daily. (Patient taking differently: Take 5 mg by mouth Daily.) 90 tablet 3    • aspirin 81 MG tablet Take 1 tablet by mouth Daily. (Patient taking differently: Take 81 mg by mouth Daily. Continues per Dr. Martinez's orders) 30 tablet 11    • DULoxetine (CYMBALTA) 60 MG capsule Take 1 capsule by mouth Daily. 90 capsule 3    • furosemide (LASIX) 40 MG tablet Take 1 tablet by mouth Daily. 30 tablet 0    • Hydrocortisone, Perianal, (ANUSOL-HC) 2.5 % rectal cream Insert  into the rectum 2 (Two) Times a Day.      • Insulin Glargine (LANTUS SOLOSTAR) 100 UNIT/ML injection pen Inject 20 Units under the skin into the appropriate area as directed Every 12 (Twelve) Hours. 21 mL 0    • Insulin Lispro, 1 Unit Dial, (HUMALOG) 100 UNIT/ML solution pen-injector Inject 4 Units under the skin into the appropriate area as directed 3 (Three) Times a Day With Meals. 6 mL 5    • lidocaine (LIDODERM) 5 % Place 1 patch on  the skin as directed by provider Daily. Remove & Discard patch within 12 hours or as directed by MD      • melatonin 5 MG tablet tablet Take 1 tablet by mouth At Night As Needed (sleep).      • metoprolol tartrate (LOPRESSOR) 100 MG tablet Take 1 tablet by mouth Every 12 (Twelve) Hours. 180 tablet 3    • Misc. Devices (MATTRESS PAD) misc Use 1 gel mattress pad as directed on packaging 1 each 0    • nystatin (MYCOSTATIN) 863012 UNIT/GM cream Apply  topically to the appropriate area as directed 2 (Two) Times a Day. (Patient taking differently: Apply 1 application topically to the appropriate area as directed 2 (Two) Times a Day.) 90 g 3    • oxyCODONE-acetaminophen (PERCOCET)  MG per tablet Take 1 tablet by mouth 2 (Two) Times a Day. 4 tablet 0    • QUEtiapine (SEROquel) 25 MG tablet Take 0.5 tablets by mouth Every Night. 30 tablet 0    • saccharomyces boulardii (FLORASTOR) 250 MG capsule Take 1 capsule by mouth 2 (Two) Times a Day.      • spironolactone (ALDACTONE) 25 MG tablet Take 1 tablet by mouth Daily.      • terazosin (HYTRIN) 10 MG capsule Take 1 capsule by mouth Every Night. 30 capsule 1        No Known Allergies    Objective   Objective     Vital Signs:   Temp:  [97.9 °F (36.6 °C)-98 °F (36.7 °C)] 98 °F (36.7 °C)  Heart Rate:  [86-87] 87  Resp:  [16-18] 18  BP: (142-166)/() 166/106        Physical Exam   Constitutional: Awake, alert, chronically ill-appearing  Eyes: PERRL, sclerae anicteric, no conjunctival injection  HENT: NCAT, mucous membranes moist  Neck: Supple, trachea midline  Respiratory: Diminished at the bilateral bases, slight tachypnea and unable to speak in full sentences  Cardiovascular: RRR, no murmurs, rubs, or gallops, palpable radial pulses bilaterally  Gastrointestinal: Positive bowel sounds, soft, nontender, nondistended  Musculoskeletal: LT BKA, right anterior shin with wound with dried purulent drainage and bandage  Psychiatric: Appropriate affect, cooperative  Neurologic:  Speech clear    Results Reviewed:  I have personally reviewed most recent indicated data and agree with findings including:  [x]  Laboratory  [x]  Radiology  []  EKG/Telemetry  []  Pathology  []  Cardiac/Vascular Studies  []  Old records  []  Other:  Most pertinent findings include: Labs from outside hospital include BNP of 11,000, preserved renal function, CTA of the chest without PE, moderate-large bilateral pleural effusions with atelectasis, duplex of the right lower extremity with good flow and no DVT            Invalid input(s):  ALKPHOS  Estimated Creatinine Clearance: 259.1 mL/min (A) (by C-G formula based on SCr of 0.5 mg/dL (L)).  Brief Urine Lab Results  (Last result in the past 365 days)      Color   Clarity   Blood   Leuk Est   Nitrite   Protein   CREAT   Urine HCG        09/28/20 0237 Yellow Turbid Moderate (2+) Large (3+) Negative 100 mg/dL (2+)             Imaging Results (Last 24 Hours)     ** No results found for the last 24 hours. **        Results for orders placed during the hospital encounter of 07/07/20   Adult Transthoracic Echo Complete W/ Cont if Necessary Per Protocol    Narrative · Estimated EF = 50%.  · Left ventricular systolic function is mildly decreased.  · There is calcification of the aortic valve mainly affecting the non   coronary cusp(s).  · Aortic valve maximum pressure gradient is 11.7 mmHg.          Assessment/Plan   Assessment & Plan     Active Hospital Problems    Diagnosis POA   • **Acute exacerbation of CHF (congestive heart failure) (CMS/[facility]) [I50.9] Yes   • Medical non-compliance [Z91.19] Not Applicable   • Self neglect [R46.89] Yes   • Diastolic CHF, chronic (CMS/[facility]) [I50.32] Yes   • Pleural effusion due to CHF (congestive heart failure) (CMS/[facility]) [I50.9] Yes   • History of Clostridioides difficile colitis [Z86.19] Not Applicable   • Peripheral vascular disease (CMS/[facility]) [I73.9] Yes   • S/P BKA (below knee amputation), left (CMS/[facility]) [Z89.512] Not Applicable   •  Diabetic ulcer of right lower leg (CMS/HCC) [E11.622, L97.919] Yes   • Type 2 diabetes mellitus with circulatory disorder and uncontrolled [E11.59] Yes   • Liver cirrhosis secondary to DUNLAP (CMS/HCC) [K75.81, K74.60] Yes   • Essential hypertension [I10] Yes   • Non-compliance [Z91.14] Not Applicable   • Hyperlipemia [E78.5] Yes     Summary: This is a 50-year-old male with uncontrolled diabetes, Dunlap cirrhosis, PVD requiring prior left BKA, skin MRSA, prior C. difficile, chronically very uncontrolled diabetes, nonadherence with medical care, diastolic CHF previously requiring chest tube who left the ER AMA just under 2 weeks PTA who presents in transfer from outside hospital after not taking his diuretics at home due to urinary incontinence respiratory difficulty and large effusions, also with chronic extremity with    Assessment/plan    Check lab studies for baseline    Acute exacerbation of chronic diastolic CHF with hypoxia  Large bilateral pleural effusion  -Last EF 50%  -Does not take his home Lasix because he is incontinent of urine  -CT of the chest at outside hospital negative for PE, moderate-large bilateral pleural effusions, BNP 11,000  -Strict intake and output, daily weights, fluid restricted diet  -Bumex 1 mg IV BID AC  -If does not improve over the next day or 2 we will pursue thoracentesis  -Continue O2 support as needed    Chronic right lower extremity wound  -Reporting palliative care had put him on antibiotics PTA, reporting ceftriaxone and Levaquin though this is not confirmed  -We will attempt to culture purulent drainage  -Start vancomycin/cefepime  -Probiotics due to history of CAD  -Wound care to see  -Has seen Dr. Dong in the past, will plan to consult tomorrow/Monday    Chronically uncontrolled diabetes mellitus type 2, with long-term use of insulin  -Last A1c 10.0%  -Levemir with sliding scale slightly lower than home dose, monitor and adjust as appropriate    Right lower extremity  edema  -Duplex OSH without DVT    History of C. difficile colitis  -Probiotic while on antibiotics, ID to see tomorrow  -Currently no diarrhea    Bipolar disorder  Depression  -Continue Cymbalta, Seroquel    Hypertension  -Continue amlodipine, Lopressor    Johnston cirrhosis  -Does not take diuretics at home as noted    DVT prophylaxis: Heparin subcu    CODE STATUS: Confirmed with patient that he is DNR/DNI  Code Status and Medical Interventions:   Ordered at: 11/15/20 0848     Limited Support to NOT Include:    Intubation     Level Of Support Discussed With:    Patient     Code Status:    No CPR     Medical Interventions (Level of Support Prior to Arrest):    Limited       Admission Status:  I believe this patient meets INPATIENT status due to exacerbation of CHF with hypoxia, lower extremity wound failing outpatient antibiotics.  I feel patient’s risk for adverse outcomes and need for care warrant INPATIENT evaluation and I predict the patient’s care encounter to likely last beyond 2 midnights.      Mauricio Yin, DO  11/15/20

## 2020-11-15 NOTE — NURSING NOTE
Patient extremely unpleasant and uncooperative throughout shift. Patient stated he was going to leave AMA several times. When presented with paperwork said he would stay. Patient then said he was going to leave around 2:30 pm. Patient given AMA paper work and signed. Spouse brought motorized wheelchair. And patient left AMA

## 2020-11-15 NOTE — NURSING NOTE
Consulted to assess patients RLE:     Patient well known to [facility] from previous admissions.   RLE presents with redness, swelling, dried crusted ulceration to his anterior RLE.     Open ulceration to his lower lateral right ankle.     Non-palpaple pedal and PT pulses.     Unsure of plan for him.   No notes in chart.   ID or CT surgery consult?     At this time treat right ankle wound with Hydrofera blue every other day dressing changes.     His coccyx is intact and blanching.     [facility] and branden of 10.    Will continue to follow and make changes to wound care plan as needed.     Thanks

## 2020-11-15 NOTE — PROGRESS NOTES
Pharmacy Consult-Vancomycin Dosing  Kendrick Crystal is a  50 y.o. male receiving vancomycin therapy.     Indication: SSTI  Consulting Provider: Hospitalist   ID Consult: No    Goal AUC: 400 - 600 mg/L*hr    Current Antimicrobial Therapy  Anti-Infectives (From admission, onward)      Ordered     Dose/Rate Route Frequency Start Stop    11/15/20 0852  cefepime (MAXIPIME) 2 g/100 mL 0.9% NS (mbp)     Ordering Provider: Mauricio Yin DO    2 g  over 4 Hours Intravenous Every 8 Hours 11/15/20 1745 11/22/20 1744    11/15/20 0852  cefepime (MAXIPIME) 2 g/100 mL 0.9% NS (mbp)     Ordering Provider: Mauricio Yin DO    2 g  200 mL/hr over 30 Minutes Intravenous Once 11/15/20 0945      11/15/20 0852  Pharmacy to dose vancomycin     Ordering Provider: Mauricio Yin DO     Does not apply Continuous PRN 11/15/20 0851 11/22/20 0850            Allergies  Allergies as of 11/15/2020    (No Known Allergies)       Labs    Results from last 7 days   Lab Units 11/15/20  0910   BUN mg/dL 28*   CREATININE mg/dL 0.59*       Results from last 7 days   Lab Units 11/15/20  0910   WBC 10*3/mm3 8.56       Evaluation of Dosing     Last Dose Received in the ED/Outside Facility: N/A  Is Patient on Dialysis or Renal Replacement: No    Ht -    Wt - (!) 139 kg (307 lb)    Estimated Creatinine Clearance: 219.6 mL/min (A) (by C-G formula based on SCr of 0.59 mg/dL (L)).    Intake & Output (last 3 days)       None            Microbiology and Radiology  Microbiology Results (last 10 days)       ** No results found for the last 240 hours. **            Reported Vancomycin Levels                         InsightRX AUC Calculation:    Current AUC:   N/A mg/L*hr    Predicted Steady State AUC on Current Dose: 446 mg/L*hr  _________________________________    Predicted Steady State AUC on New Dose: N/A    Assessment/Plan:  1. Pharmacy consulted to dose vancomycin for SSTI. Goal trough 400-600 mg/L*hr.  2. Will give a loading dose of  vancomycin 2500 mg (~17.9 mg/kg) IV on 11/15 @ 1300. Initiate maintenance dose of vancomycin 1250 mg (~9 mg/kg) IV Q12hr on 11/16 @ 0100.  3. Assess clearance by trough level on 11/17 @ 1230, prior to 5th dose.  4. Pharmacy will continue to monitor renal function, cultures and sensitivities, and clinical status to adjust regimen as necessary.    Sheri Levin, Ilene  Pharmacy Resident   11/15/2020 11:25 EST

## 2020-11-15 NOTE — DISCHARGE SUMMARY
[facility] Medicine Services  ELOPEMENT AGAINST MEDICAL ADVICE    Patient Name: Kendrick Crystal  : 1970  MRN: [id number]    Date of Admission: 11/15/2020  Date of Elopement: 11/15/2020  Primary Care Physician: Stanley Carrasco DO    Consults     Date and Time Order Name Status Description    11/15/2020 1352 Inpatient Infectious Diseases Consult          Hospital Course     Presenting Problem:   Cellulitis of right lower extremity [L03.115]  Acute exacerbation of CHF (congestive heart failure) (CMS/HCC) [I50.9]    Active Hospital Problems    Diagnosis  POA   • **Acute exacerbation of CHF (congestive heart failure) (CMS/HCC) [I50.9]  Yes   • Medical non-compliance [Z91.19]  Not Applicable   • Self neglect [R46.89]  Yes   • Diastolic CHF, chronic (CMS/HCC) [I50.32]  Yes   • Pleural effusion due to CHF (congestive heart failure) (CMS/HCC) [I50.9]  Yes   • History of Clostridioides difficile colitis [Z86.19]  Not Applicable   • Peripheral vascular disease (CMS/HCC) [I73.9]  Yes   • S/P BKA (below knee amputation), left (CMS/[facility]) [Z89.512]  Not Applicable   • Diabetic ulcer of right lower leg (CMS/[facility]) [E11.622, L97.919]  Yes   • Type 2 diabetes mellitus with circulatory disorder and uncontrolled [E11.59]  Yes   • Liver cirrhosis secondary to DUNLAP (CMS/[facility]) [K75.81, K74.60]  Yes   • Essential hypertension [I10]  Yes   • Non-compliance [Z91.14]  Not Applicable   • Hyperlipemia [E78.5]  Yes      Resolved Hospital Problems   No resolved problems to display.          Hospital Course:  Kendrick Crystal is a 50 y.o. male with conditions as noted in the H&P from today who was recently in our ER 10/4/2020 and left AMA which required him acquiring his own ambulance transport home who presented to [facility] complaining of shortness of breath and found to have large bilateral pleural effusions and an exacerbation of his diastolic CHF felt to be related to his nonadherence to his Lasix  due to his chronic urinary incontinence.  Admission was sought within the [facility] who have declined him citing that he has peripheral vascular disease and that his vascular surgeon is at [facility], the connection between this and an exacerbation of diastolic heart failure is not clear to me, regardless admission was sought at our facility and he was accepted.  He was transferred here overnight and arrived early this morning.  He was admitted and antibiotics were started for a chronic right leg wound, IV diuretics were started for his large effusions, and it was explained to him that if he did not improve we would pursue thoracentesis tomorrow (Monday) when interventional radiology is available.  I have received multiple pages from nursing today regarding the patient being rude and demanding of staff, requesting to leave AMA multiple times today, and asking to leave AMA with the Walker catheter in place.  Ultimately he agreed to stay and be seen by hospice (was previously planned to be seen by hospice outpatient tomorrow) with the anticipation that he might be discharged home with hospice and everything that he needs.  Prior to being able to be seen he did ultimately decide to sign himself AMA.    **Patient left AMA prior to completion of evaluation and management**      Day of Discharge     HPI:   **Patient left AMA prior to completion of evaluation and management**    Vital Signs:   Temp:  [97.9 °F (36.6 °C)-98.2 °F (36.8 °C)] 98.2 °F (36.8 °C)  Heart Rate:  [74-87] 74  Resp:  [16-18] 18  BP: (135-166)/() 135/81     Physical Exam (if applicable):  N/A, patient signed himself out AMA prior to my being able to reevaluate him  Pending Labs     Order Current Status    Blood Culture - Blood, Arm, Left In process    Blood Culture - Blood, Arm, Right In process    COVID PRE-OP / PRE-PROCEDURE SCREENING ORDER (NO ISOLATION) - Swab, Nasopharynx In process    COVID-19,[facility], NP SWAB IN LEXAR VIRAL  TRANSPORT MEDIA 24-30 HR TAT - Swab, Nasopharynx In process        Discharge Details     Discharge Disposition:  **Patient left AMA prior to completion of evaluation and management, therefore discharge planning remains incomplete including absence of any needed discharging medications, testing arrangements or follow up unless otherwise specified**    No future appointments.          Mauricio Yin, DO  11/15/20

## 2020-11-15 NOTE — NURSING NOTE
"  ACC REVIEW REPORT: [facility]        PATIENT NAME: Kendrick Crystal    PATIENT ID: [id number]        COVID-19 ACC SCREENING       DOES THE PATIENT HAVE A FEVER GREATER THAN OR EQUAL .4: no    IS THE PATIENT EXPERIENCING SHORTNESS OF BREATH: no    DOES THE PATIENT HAVE A COUGH: no  DOES THE PATIENT HAVE ANY OF THE FOLLOWING RISK FACTORS:    EXPOSURE TO SUSPECTED OR KNOWN COVID-19: no    RECENT TRAVEL HISTORY TO ENDEMIC AREA (DOMESTIC/LOCAL): no    IS THE PATIENT A HEALTHCARE WORKER: no    HAS THE PATIENT EXPERIENCED A LOSS OF SENSE OF TASTE OR SMELL: no    HAS THE PATIENT BEEN TESTED FOR COVID-19: yes    DATE TESTED: 11/15/2020    LAB TESTING SENT TO: [facility]          BED: s333    BED TYPE: tele    BED GIVEN TO: JOSE Maddox     TIME BED GIVEN: 0428    TODAY'S DATE: 11/15/2020    TRANSFER DATE: 11/15/2020    ETA: pending ground transport    TRANSFERR[facility] FAC[facility]: [facility]     TRANSFERRING FACILITY PHONE # : [phone]    TRANSFERRING MD: Dr Nguyen    DATE/TIME REQUEST RECEIVED: 11/14/2020 at 2231    [facility] RN: Angela Merritt RN    REPORT FROM: JOSE Maddox    TIME REPORT TAKEN: 0428    DIAGNOSIS: CHF exacerbation, cellulitis of RLE    REASON FOR TRANSFER TO [facility]: established care, vascular    TRANSPORTATION: ground EMS    CLINICAL INFORMATION    HEIGHT: 75\"    WEIGHT: 235 lb    ALLERGIES: NKDA    VITAL SIGNS:   TIME: 0300  TEMP: 97.3  PULSE: 85  B/P: 137/79  RESP: 19        LAB INFORMATION: proBNP 19391, D-dimer 1386    MEDS/IV FLUIDS: 18 R AC - 40 mg lasix, ativan 1 mg, morphine 4 mg, 1g rocephin, 2500 mg vanc      CARDIAC SYSTEM:    CHEST PAIN: no    RHYTHM: NSR     Is patient taking or has patient been given any drugs that could increase bleeding? no    CARDIAC NOTES: h/o PVD      RESPIRATORY SYSTEM:    LUNG SOUNDS: wheezy    OXYGEN: 2L NC    O2 SAT: 98%    IMAGING FINDINGS: CXR - unable to read result at this time    RESPIRATORY STATUS: short of breath      CNS/MUSCULOSKELETAL    ALERT " AND ORIENTED:  A&O x4    CASS COMA SCALE:    E: 4  M: 6  V: 5    CNS/MUSCULOSKELETAL NOTES: L AKA, wheelchair bound, needs lift team to transfer, open wound on right ankle & from right knee to right ankle      GI//GY      ABDOMINAL PAIN: no    VOMITING: no    DIARRHEA: no    NAUSEA: no    GI//GY NOTES: incontinent    RIVERO: yes 16fr - clear yellow output    PAST MEDICAL HISTORY: DM, vision issues, L AKA, no pedal pulse in right foot, noncompliant    OTHER SYMPTOM NOTES: nonhealing wounds on right leg - vascular aware & states patient needs amputation    ADDITIONAL NOTES:            Angela Merritt RN  11/15/2020  04:28 EST

## 2020-11-17 NOTE — TELEPHONE ENCOUNTER
Patients wife called to make appointment. She stated that his leg continues to get worse. Also stated that patient is in hospice care for CHF. I spoke to Dr Martinez and we have made Mr Crystal an appointment for next week on 11/24/20. Mrs Crystal said she would bring Mr Crystal to the appointment.

## 2020-11-24 NOTE — PROGRESS NOTES
[facility] Follow Up Note     Date of Encounter: 11/24/2020     MRN Number: [id number]  Name: Kendrick Crystal  Phone Number: [phone]     Referred By: No ref. provider found  PCP: Stanley Carrasco DO    Chief Complaint:    Chief Complaint   Patient presents with   • Foot Ulcer     Follow-up to evaluate right foot ulcer       History of Present Illness:    Kendrick Crystal is a 50 y.o. male with a history of hypertension, hyperlipidemia, poorly controlled diabetes mellitus, Johnston cirrhosis, chronic kidney disease, peripheral vascular disease status post left below the knee amputation, peripheral vascular disease status post left below the knee amputation and necrotic right lower extremity wound status post debridement on 5/27/2020 with Dr. Martinez.  Last saw patient in hospital in Sept/October for right lower extremity ulcerations which were healing with granulous tissue noted in the setting of acute sepsis.  Patient left AMA.  Patient has since been placed in hospice care over the last 2 weeks with ongoing deterioration with heart failure per patient and wife.  However, patient with edema/scattered ulcerations on right lower extremity and has called our office to be seen for this.  Hospice nurse did discuss with patient and wife that that is outside of hospice care, but they agreed to proceed with office visit today.  Patient has not had any recent antibiotics and wound care to the right lower extremity.  Patient is bedbound and he has not been elevating his legs.  Right lower extremity wounds have had clear drainage.  Denies any fevers or chills.  Currently on diuretics.    Review of Systems:  Review of Systems   Constitution: Negative for chills, decreased appetite, diaphoresis, fever, malaise/fatigue, night sweats and weight loss.   HENT: Negative for congestion, hoarse voice, sore throat and stridor.    Cardiovascular: Positive for leg swelling. Negative for chest pain,  claudication, dyspnea on exertion, irregular heartbeat, near-syncope, orthopnea, palpitations, paroxysmal nocturnal dyspnea and syncope.   Respiratory: Positive for shortness of breath. Negative for cough, hemoptysis, sleep disturbances due to breathing, snoring, sputum production and wheezing.    Hematologic/Lymphatic: Negative for adenopathy and bleeding problem. Does not bruise/bleed easily.   Skin: Positive for poor wound healing. Negative for color change, dry skin, itching and rash.   Musculoskeletal: Negative for arthritis, back pain, falls and muscle weakness.   Gastrointestinal: Positive for diarrhea. Negative for abdominal pain, anorexia, constipation, hematochezia, melena, nausea and vomiting.   Neurological: Negative for difficulty with concentration, disturbances in coordination, dizziness, loss of balance, numbness, seizures, vertigo and weakness.   Psychiatric/Behavioral: Negative for altered mental status, depression, memory loss and substance abuse. The patient has insomnia. The patient is not nervous/anxious.    Allergic/Immunologic: Negative for persistent infections.       I have reviewed the review of systems as entered by my clinical support staff and have updated it as appropriate.       Allergies:  No Known Allergies    Medications:      Current Outpatient Medications:   •  amLODIPine (NORVASC) 10 MG tablet, Take 1 tablet by mouth Daily. (Patient taking differently: Take 5 mg by mouth Daily.), Disp: 90 tablet, Rfl: 3  •  aspirin 81 MG tablet, Take 1 tablet by mouth Daily. (Patient taking differently: Take 81 mg by mouth Daily. Continues per Dr. Martinez's orders), Disp: 30 tablet, Rfl: 11  •  DULoxetine (CYMBALTA) 60 MG capsule, Take 1 capsule by mouth Daily., Disp: 90 capsule, Rfl: 3  •  furosemide (LASIX) 40 MG tablet, Take 1 tablet by mouth Daily., Disp: 30 tablet, Rfl: 0  •  Hydrocortisone, Perianal, (ANUSOL-HC) 2.5 % rectal cream, Insert  into the rectum 2 (Two) Times a Day., Disp: , Rfl:    •  Insulin Glargine (LANTUS SOLOSTAR) 100 UNIT/ML injection pen, Inject 20 Units under the skin into the appropriate area as directed Every 12 (Twelve) Hours., Disp: 21 mL, Rfl: 0  •  Insulin Lispro, 1 Unit Dial, (HUMALOG) 100 UNIT/ML solution pen-injector, Inject 4 Units under the skin into the appropriate area as directed 3 (Three) Times a Day With Meals., Disp: 6 mL, Rfl: 5  •  lidocaine (LIDODERM) 5 %, Place 1 patch on the skin as directed by provider Daily. Remove & Discard patch within 12 hours or as directed by MD, Disp: , Rfl:   •  melatonin 5 MG tablet tablet, Take 1 tablet by mouth At Night As Needed (sleep)., Disp: , Rfl:   •  metoprolol tartrate (LOPRESSOR) 100 MG tablet, Take 1 tablet by mouth Every 12 (Twelve) Hours., Disp: 180 tablet, Rfl: 3  •  Misc. Devices (MATTRESS PAD) misc, Use 1 gel mattress pad as directed on packaging, Disp: 1 each, Rfl: 0  •  nystatin (MYCOSTATIN) 264069 UNIT/GM cream, Apply  topically to the appropriate area as directed 2 (Two) Times a Day. (Patient taking differently: Apply 1 application topically to the appropriate area as directed 2 (Two) Times a Day.), Disp: 90 g, Rfl: 3  •  oxyCODONE-acetaminophen (PERCOCET)  MG per tablet, Take 1 tablet by mouth 2 (Two) Times a Day., Disp: 4 tablet, Rfl: 0  •  QUEtiapine (SEROquel) 25 MG tablet, Take 0.5 tablets by mouth Every Night., Disp: 30 tablet, Rfl: 0  •  saccharomyces boulardii (FLORASTOR) 250 MG capsule, Take 1 capsule by mouth 2 (Two) Times a Day., Disp: , Rfl:   •  spironolactone (ALDACTONE) 25 MG tablet, Take 1 tablet by mouth Daily., Disp: , Rfl:   •  terazosin (HYTRIN) 10 MG capsule, Take 1 capsule by mouth Every Night., Disp: 30 capsule, Rfl: 1  No current facility-administered medications for this visit.     Facility-Administered Medications Ordered in Other Visits:   •  Chlorhexidine Gluconate Cloth 2 % pads 1 application, 1 application, Topical, Q12H PRN, Jewels Infante APRN    Social History      Socioeconomic History   • Marital status:      Spouse name: Not on file   • Number of children: 1   • Years of education: Not on file   • Highest education level: Not on file   Occupational History   • Occupation: Security     Comment: Disabled-Diabetic Retinopathy   Tobacco Use   • Smoking status: Former Smoker     Years: 10.00     Types: Cigars     Quit date: 2018     Years since quittin.0   • Smokeless tobacco: Never Used   • Tobacco comment: 4 cigars a year   Substance and Sexual Activity   • Alcohol use: No     Frequency: Never   • Drug use: No   • Sexual activity: Defer   Social History Narrative    Lives in [facility] with spouse. Has caregiver daily       Family History   Problem Relation Age of Onset   • Arthritis Mother    • Hypertension Mother    • Kidney disease Mother    • Stroke Mother    • Heart attack Father    • Arthritis Father    • Diabetes Father    • Hyperlipidemia Father    • Hypertension Father    • Mental illness Sister    • Diabetes Brother    • Hypertension Brother    • Obesity Brother        Past Medical History:   Diagnosis Date   • Abdominal wall cellulitis 2019   • JUAN (acute kidney injury) (CMS/HCC) 2018   • Arthritis    • Bipolar 1 disorder (CMS/HCC)    • Cellulitis of right anterior lower leg 2018    WITH MRSA   • Chronic kidney disease, stage 3    • Cirrhosis (CMS/HCC)    • Counseling for insulin pump     removed   • Depression    • Diabetes mellitus (CMS/HCC)    • Encephalopathy, hepatic (CMS/HCC)    • H/O degenerative disc disease    • History of Lissa's gangrene     right leg/testicle   • Hyperlipemia    • Hypertension    • MRSA infection    • multiple Skin abscesses    • DUNLAP, bx showed stage IV fibrosis    • Neuropathy    • Peripheral vascular disease (CMS/HCC)    • Thrombophlebitis        Past Surgical History:   Procedure Laterality Date   • ABDOMINAL WALL ABSCESS INCISION AND DRAINAGE N/A 2019    Procedure: ABDOMINAL WALL  "DEBRIDEMENT;  Surgeon: Fadi Kern MD;  Location:  [facility];  Service: General   • AMPUTATION DIGIT Left 1/30/2017    Procedure: left fourth and fifth transmetatarsal toe amputation ;  Surgeon: Juancho Martinez MD;  Location:  [facility];  Service:    • BELOW KNEE AMPUTATION Left 3/2/2017    Procedure: AMPUTATION BELOW KNEE, SHMUEL;  Surgeon: Juancho Martinez MD;  Location:  [facility];  Service:    • CYSTOSCOPY N/A 10/16/2019    Procedure: CYSTOSCOPY;  Surgeon: Brad Gutierres MD;  Location:  [facility];  Service: Urology   • CYSTOSCOPY TRANSURETHRAL RESECTION OF PROSTATE N/A 6/18/2020    Procedure: CYSTOSCOPY TRANSURETHRAL RESECTION OF PROSTATIC ABCESS;  Surgeon: Mumtaz Paez MD;  Location:  [facility];  Service: Urology;  Laterality: N/A;   • ENDOSCOPY     • HEMORRHOIDECTOMY N/A 8/2/2019    Procedure: EXCISION AND DRAIN PERIRECTAL ABSCESS;  Surgeon: Mumtaz Payne MD;  Location:  [facility];  Service: General   • INCISION AND DRAINAGE LEG Right 5/27/2020    Procedure: LOWER EXTREMITY DEBRIDEMENT RIGHT;  Surgeon: Juancho Martinez MD;  Location:  [facility];  Service: General;  Laterality: Right;   • LEG SURGERY     • SIGMOIDOSCOPY N/A 6/11/2020    Procedure: SIGMOIDOSCOPY FLEXIBLE;  Surgeon: Mumtaz Payne MD;  Location:  [facility];  Service: General;  Laterality: N/A;   • TESTICLE SURGERY Right     DEBRIDEMENT FROM GANGRENE   • TONSILLECTOMY  1975       Physical Exam:  Vital Signs:    Vitals:    11/24/20 1430   BP: 163/100   BP Location: Right arm   Patient Position: Sitting   Pulse: 90   Temp: 97.1 °F (36.2 °C)   SpO2: 100%   Weight: 109 kg (240 lb)   Height: 190.5 cm (75\")     Body mass index is 30 kg/m².     Physical Exam  Vitals signs and nursing note reviewed.   Constitutional:       Appearance: Normal appearance. He is well-developed and well-groomed.   HENT:      Head: Normocephalic and atraumatic.   Neck:      Musculoskeletal: Neck supple.   Cardiovascular:      Rate and Rhythm: Normal rate and " regular rhythm.      Pulses:           Dorsalis pedis pulses are detected w/ Doppler on the right side.        Posterior tibial pulses are detected w/ Doppler on the right side.      Heart sounds: Normal heart sounds, S1 normal and S2 normal. No murmur. No friction rub.      Comments: Left bka  Pulmonary:      Comments: Unlabored, Clear to auscultation bilaterally  Abdominal:      General: Bowel sounds are normal.      Palpations: Abdomen is soft.      Tenderness: There is no abdominal tenderness.   Musculoskeletal:      Right lower leg: 3+ Pitting Edema present.      Left lower le+ Edema present.   Skin:     General: Skin is warm and dry.      Comments: Right lower extremity edema with scattered ulcerations with areas bleeding.  Noted 3+ pitting edema, weeping.  Very mild erythema in calf area.  Right heel surgical area healing with no necrosis, edema or erythema and noted granulous tissue.   Neurological:      Mental Status: He is alert and oriented to person, place, and time.   Psychiatric:         Attention and Perception: Attention normal.         Mood and Affect: Mood normal.         Speech: Speech normal.         Behavior: Behavior is cooperative.         Labs/Imaging:    Xr Chest 1 View    Result Date: 2020  Mild cardiogenic pulmonary edema with small left pleural effusion.  This report was finalized on 2020 12:16 PM by Brad Weiss.       Results for orders placed during the hospital encounter of 20   Adult Transthoracic Echo Complete W/ Cont if Necessary Per Protocol    Narrative · Estimated EF = 50%.  · Left ventricular systolic function is mildly decreased.  · There is calcification of the aortic valve mainly affecting the non   coronary cusp(s).  · Aortic valve maximum pressure gradient is 11.7 mmHg.            Assessment / Plan:  Diagnoses and all orders for this visit:    1. Wound of right lower extremity, initial encounter (Primary)    2. Edema leg     Kendrick Crystal is a 50  y.o. male with a history of hypertension, hyperlipidemia, poorly controlled diabetes mellitus, Johnston cirrhosis, chronic kidney disease, peripheral vascular disease status post left below the knee amputation, peripheral vascular disease status post left below the knee amputation and necrotic right lower extremity wound status post debridement on 5/27/2020 with Dr. Martinez.  Patient with ongoing right lower extremity ulcerations and anasarca with clear drainage, dopplerable pulses.  Right foot surgical site is healing well.  Denies any fevers or chills. Dr. Martinez examined patient's leg.  Right lower extremity does not appear to be infected.  Will order wound care with Unna boot placement.  Have encouraged patient and wife to elevate legs while in bed.  Will have patient to follow-up in 1 month or earlier as needed.    CHINTAN Longoria  [facility]    Please note that portions of this note may have been completed with a voice recognition program. Efforts were made to edit the dictations, but occasionally words are mistranscribed.

## 2020-11-25 PROBLEM — R60.0 EDEMA LEG: Status: ACTIVE | Noted: 2020-01-01

## 2020-12-11 NOTE — PROGRESS NOTES
"Patient Information  Kendrick Crystal                                                                                          [address] [place] [address] [address] [place] [id number]      1970  [unfilled]  [unfilled]    Chief Complaint   Patient presents with   • Foot Ulcer     Follow-up to evaluate right foot ulcer. Discoloration in right toes and drainage.        History of Present Illness: Patient seen today at request of family for further evaluation of multiple right foot ulcerations involving all the toes of the right foot.  This is appeared over the past 2 weeks and followed placement of Unna boot of the right lower extremity secondary to chronic venous stasis disease.  Patient had a previous left below-knee amputation the past for gangrene.  He has had debridement of a posterior calf ulceration by Dr. Martinez in late May 2020 which healed after debridement.  I saw the patient on 1 day visit on September 29, 2020 for follow-up of that excisional debridement at that time I stated that the patient's areas had healed regarding the prior debridement in May 2020.  The patient's wife has discontinued his hospice visit from the nurses.    Vitals:    12/11/20 1337   BP: 166/96   BP Location: Left arm   Patient Position: Sitting   Pulse: 82   Temp: 97.5 °F (36.4 °C)   SpO2: 98%   Weight: 113 kg (250 lb)   Height: 190.5 cm (75\")        Physical Exam examination reveals deep ulcerations with eschar formation of all the toes of the right foot.  There is 3+ to 4+ edema from the knee distally.  The patient cannot flex his knee completely.  He does have strong dopplerable pedal pulses.  Patient's left below-knee amputation is well-healed.  Lab/other results:    Assessment: New ulcerations of all the toes of the right foot secondary to venous stasis disease and application of Unna boots over the past 2 weeks per  hospice nurses.  2.  Status post remote left below-knee amputation well-healed.  Plan: We will have the patient admitted to  the " hospitalist service for aggressive wound care and infectious disease consultation for IV antibiotics.    Murtaza Arriola M.D.

## 2020-12-13 PROBLEM — L89.90 DECUBITUS ULCER: Status: ACTIVE | Noted: 2020-01-01

## 2020-12-13 PROBLEM — E11.9 TYPE 2 DIABETES MELLITUS (HCC): Status: ACTIVE | Noted: 2017-04-25

## 2020-12-13 PROBLEM — E11.621 DIABETIC FOOT ULCER (HCC): Status: ACTIVE | Noted: 2020-01-01

## 2020-12-13 PROBLEM — T83.511A UTI (URINARY TRACT INFECTION) DUE TO URINARY INDWELLING CATHETER (HCC): Status: ACTIVE | Noted: 2020-01-01

## 2020-12-13 PROBLEM — L97.509 DIABETIC FOOT ULCER (HCC): Status: ACTIVE | Noted: 2020-01-01

## 2020-12-13 PROBLEM — N39.0 UTI (URINARY TRACT INFECTION) DUE TO URINARY INDWELLING CATHETER (HCC): Status: ACTIVE | Noted: 2020-01-01

## 2020-12-13 NOTE — ED PROVIDER NOTES
Subjective   Mr. Kendrick Crystal is a 50 y.o. male who presents to the ED with c/o wound check. He has cellulitis on his right leg with multiple ulcerations of the right foot with some necrosis.. He has been wearing Unna boot with home health oversight. He also complains of cough but denies fever, chills, nausea, vomiting, diarrhea, loss of taste or smell. He was seen by Dr. Martinez, vascular surgeon, two days ago and directed to present to the hospital for admission due to possible need for amputation.    He has a history of DM and peripheral vascular disease. He has had a left below knee amputation.  Dr. Martinez and Dr. Su have collaborated with Mr. Crystal and his wife in the past for other ulcerations on his right leg with debridement this year that has recovered and amputation has been avoided.   Patient has been on hospice care until very recently.  His wife noticed 1-1/2 weeks ago ulceration and development of severe pus drainage at the entrance of his Walker catheter.  The wife understood at that time that such an acute finding did not qualify him for care under the umbrella of hospice agreement.  Therefore, she recently discontinued her hospice contract.       He also has a history of CHF with chronic orthopnea and is on home oxygen as needed but this is most of the time. There are no other acute symptoms at this time.  He has had no fever or chills or loss of taste or smell.       Wound Check  Location:  Right leg  Severity:  Moderate  Onset quality:  Gradual  Timing:  Constant  Progression:  Worsening  Chronicity:  Recurrent  Relieved by:  Nothing  Worsened by:  Nothing  Associated symptoms: cough and shortness of breath (chronic)    Associated symptoms: no chest pain, no diarrhea, no fever, no nausea and no vomiting        Review of Systems   Constitutional: Negative for chills and fever.   HENT:        No loss of taste or smell.   Eyes:        Patient is legally blind   Respiratory: Positive for cough and  shortness of breath (chronic).    Cardiovascular: Negative for chest pain.   Gastrointestinal: Negative for diarrhea, nausea and vomiting.   Endocrine:        Patient has hyperglycemia with poor control.  He is eating candy in the exam room.   Genitourinary:        Patient has a severe maceration of his penile shaft with foul purulent drainage at the meatus where his Walker is in place draining urine that is not translucent.  Wife states this is a new development in the last two weeks; hospice care did not require intrvention.    Neurological:        Patient has no sensory function below his knee on his right foot.  This is his long-term baseline with peripheral neuropathy   All other systems reviewed and are negative.      Past Medical History:   Diagnosis Date   • Abdominal wall cellulitis 5/20/2019   • JUAN (acute kidney injury) (CMS/HCC) 7/29/2018   • Arthritis    • Bipolar 1 disorder (CMS/HCC)    • Cellulitis of right anterior lower leg 07/29/2018    WITH MRSA   • Chronic kidney disease, stage 3    • Cirrhosis (CMS/HCC)    • Counseling for insulin pump     removed   • Depression    • Diabetes mellitus (CMS/HCC)    • Encephalopathy, hepatic (CMS/HCC)    • H/O degenerative disc disease    • History of Lissa's gangrene     right leg/testicle   • Hyperlipemia    • Hypertension    • MRSA infection    • multiple Skin abscesses    • DUNLAP, bx showed stage IV fibrosis    • Neuropathy    • Peripheral vascular disease (CMS/HCC)    • Thrombophlebitis        No Known Allergies    Past Surgical History:   Procedure Laterality Date   • ABDOMINAL WALL ABSCESS INCISION AND DRAINAGE N/A 4/26/2019    Procedure: ABDOMINAL WALL DEBRIDEMENT;  Surgeon: Fadi Kern MD;  Location:  [facility];  Service: General   • AMPUTATION DIGIT Left 1/30/2017    Procedure: left fourth and fifth transmetatarsal toe amputation ;  Surgeon: Juancho Martinez MD;  Location:  [facility];  Service:    • BELOW KNEE AMPUTATION Left 3/2/2017     Procedure: AMPUTATION BELOW KNEE, SHMUEL;  Surgeon: Juancho Martinez MD;  Location:  [facility];  Service:    • CYSTOSCOPY N/A 10/16/2019    Procedure: CYSTOSCOPY;  Surgeon: Brad Gutierres MD;  Location:  [facility];  Service: Urology   • CYSTOSCOPY TRANSURETHRAL RESECTION OF PROSTATE N/A 2020    Procedure: CYSTOSCOPY TRANSURETHRAL RESECTION OF PROSTATIC ABCESS;  Surgeon: Mumtaz Paez MD;  Location:  [facility];  Service: Urology;  Laterality: N/A;   • ENDOSCOPY     • HEMORRHOIDECTOMY N/A 2019    Procedure: EXCISION AND DRAIN PERIRECTAL ABSCESS;  Surgeon: Mumtaz Payne MD;  Location:  [facility];  Service: General   • INCISION AND DRAINAGE LEG Right 2020    Procedure: LOWER EXTREMITY DEBRIDEMENT RIGHT;  Surgeon: Juancho Martinez MD;  Location:  [facility];  Service: General;  Laterality: Right;   • LEG SURGERY     • SIGMOIDOSCOPY N/A 2020    Procedure: SIGMOIDOSCOPY FLEXIBLE;  Surgeon: Mumtaz Payne MD;  Location:  [facility];  Service: General;  Laterality: N/A;   • TESTICLE SURGERY Right     DEBRIDEMENT FROM GANGRENE   • TONSILLECTOMY  1975       Family History   Problem Relation Age of Onset   • Arthritis Mother    • Hypertension Mother    • Kidney disease Mother    • Stroke Mother    • Heart attack Father    • Arthritis Father    • Diabetes Father    • Hyperlipidemia Father    • Hypertension Father    • Mental illness Sister    • Diabetes Brother    • Hypertension Brother    • Obesity Brother        Social History     Socioeconomic History   • Marital status:      Spouse name: Not on file   • Number of children: 1   • Years of education: Not on file   • Highest education level: Not on file   Occupational History   • Occupation: Security     Comment: Disabled-Diabetic Retinopathy   Tobacco Use   • Smoking status: Former Smoker     Years: 10.00     Types: Cigars     Quit date: 2018     Years since quittin.1   • Smokeless tobacco: Never Used   • Tobacco comment: 4 cigars a year    Substance and Sexual Activity   • Alcohol use: No     Frequency: Never   • Drug use: No   • Sexual activity: Defer   Social History Narrative    Lives in [facility] with spouse. Has caregiver daily         Objective   Physical Exam  Vitals signs and nursing note reviewed.   Constitutional:       General: He is not in acute distress.     Appearance: He is well-developed. He is obese. He is ill-appearing.      Comments: Chronically ill appearing. Poor historian. Wife is preferable historian on his behalf.  He is eating some candy.  HIs vital signs are normal.          HENT:      Head: Normocephalic and atraumatic.      Right Ear: External ear normal.      Left Ear: External ear normal.      Nose: Nose normal.      Mouth/Throat:      Mouth: Mucous membranes are dry.   Eyes:      General: No scleral icterus.     Conjunctiva/sclera: Conjunctivae normal.   Neck:      Musculoskeletal: Normal range of motion and neck supple.   Cardiovascular:      Rate and Rhythm: Normal rate and regular rhythm.      Heart sounds: Normal heart sounds. No murmur.   Pulmonary:      Effort: Pulmonary effort is normal. No respiratory distress.      Breath sounds: Normal breath sounds.      Comments: Distant lung sounds    Abdominal:      General: Abdomen is flat. There is no distension.      Palpations: Abdomen is soft.      Tenderness: There is no abdominal tenderness. There is no guarding.   Genitourinary:     Comments: Patient has a severe maceration of his penile shaft with foul purulent drainage at the meatus where his Walker is in place draining urine that is not translucent.  Musculoskeletal: Normal range of motion.      Comments: Left lower extremity surgically absent.  Right lower extremity: Patient has anasarca/edema from the foot to the knee.  He has hyperpigmentation and mild erythema circumferentially at the lower leg with multiple blisters-some intact some are not intact without dion induration.  On the foot, there are  multiple necrotic areas-at least one to each toe.  Most are less than 1 cm in diameter and have well demarcated edges and appear to be dry except for the fourth digit where there is some local cellulitis.  Patient has diffuse sensory loss with peripheral neuropathy.   Skin:     General: Skin is warm and dry.   Neurological:      Mental Status: He is oriented to person, place, and time. Mental status is at baseline.   Psychiatric:      Comments: Affect is flat.            Procedures         ED Course  ED Course as of Dec 13 2258   Sun Dec 13, 2020   1848 Troponin T(!!): 0.145 [MS]   2018 BUN(!): 24 [MS]   2039 Patient's work-up is remarkable for stable anemia and leukocytosis with normal lactic acid.  His troponin is elevated but this also appears to be strangely typical in the electronic medical record.  He denies any chest pain.  Foot x-ray shows gas in the soft tissue.  Antibiotics have been initiated.  Patient has yet to produce ANY urine from his Walker catheter: The catheter was producing urine and the bag was simply changed because reintroducing a Walker catheter into his macerated penis is unreasonable at this time.  A small bolus of fluids have been initiated.  CXR shows evidence of some fluid overload and thus IV fluids have been limited.  Dr. Gonzalez accepts in patient care.  Patient and wife concur with plan.     [MS]      ED Course User Index  [MS] Florina Corley, CHINTAN     Recent Results (from the past 24 hour(s))   Comprehensive Metabolic Panel    Collection Time: 12/13/20  6:11 PM    Specimen: Blood   Result Value Ref Range    Glucose 113 (H) 65 - 99 mg/dL    BUN 24 (H) 6 - 20 mg/dL    Creatinine 0.53 (L) 0.76 - 1.27 mg/dL    Sodium 138 136 - 145 mmol/L    Potassium 4.9 3.5 - 5.2 mmol/L    Chloride 106 98 - 107 mmol/L    CO2 23.0 22.0 - 29.0 mmol/L    Calcium 8.9 8.6 - 10.5 mg/dL    Total Protein 7.0 6.0 - 8.5 g/dL    Albumin 3.00 (L) 3.50 - 5.20 g/dL    ALT (SGPT) 10 1 - 41 U/L    AST (SGOT) 14 1 -  40 U/L    Alkaline Phosphatase 250 (H) 39 - 117 U/L    Total Bilirubin 0.5 0.0 - 1.2 mg/dL    eGFR Non African Amer >150 >60 mL/min/1.73    Globulin 4.0 gm/dL    A/G Ratio 0.8 g/dL    BUN/Creatinine Ratio 45.3 (H) 7.0 - 25.0    Anion Gap 9.0 5.0 - 15.0 mmol/L   Lipase    Collection Time: 12/13/20  6:11 PM    Specimen: Blood   Result Value Ref Range    Lipase 15 13 - 60 U/L   BNP    Collection Time: 12/13/20  6:11 PM    Specimen: Blood   Result Value Ref Range    proBNP 23,666.0 (H) 0.0 - 900.0 pg/mL   Troponin    Collection Time: 12/13/20  6:11 PM    Specimen: Blood   Result Value Ref Range    Troponin T 0.145 (C) 0.000 - 0.030 ng/mL   Procalcitonin    Collection Time: 12/13/20  6:11 PM    Specimen: Blood   Result Value Ref Range    Procalcitonin 0.13 0.00 - 0.25 ng/mL   Lactic Acid, Plasma    Collection Time: 12/13/20  6:11 PM    Specimen: Blood   Result Value Ref Range    Lactate 0.9 0.5 - 2.0 mmol/L   CK    Collection Time: 12/13/20  6:11 PM    Specimen: Blood   Result Value Ref Range    Creatine Kinase 40 20 - 200 U/L   Magnesium    Collection Time: 12/13/20  6:11 PM    Specimen: Blood   Result Value Ref Range    Magnesium 1.8 1.6 - 2.6 mg/dL   CBC Auto Differential    Collection Time: 12/13/20  6:11 PM    Specimen: Blood   Result Value Ref Range    WBC 12.63 (H) 3.40 - 10.80 10*3/mm3    RBC 3.98 (L) 4.14 - 5.80 10*6/mm3    Hemoglobin 8.7 (L) 13.0 - 17.7 g/dL    Hematocrit 30.0 (L) 37.5 - 51.0 %    MCV 75.4 (L) 79.0 - 97.0 fL    MCH 21.9 (L) 26.6 - 33.0 pg    MCHC 29.0 (L) 31.5 - 35.7 g/dL    RDW 17.1 (H) 12.3 - 15.4 %    RDW-SD 46.0 37.0 - 54.0 fl    MPV 9.8 6.0 - 12.0 fL    Platelets 398 140 - 450 10*3/mm3    Neutrophil % 75.1 42.7 - 76.0 %    Lymphocyte % 11.6 (L) 19.6 - 45.3 %    Monocyte % 7.8 5.0 - 12.0 %    Eosinophil % 4.3 0.3 - 6.2 %    Basophil % 0.9 0.0 - 1.5 %    Immature Grans % 0.3 0.0 - 0.5 %    Neutrophils, Absolute 9.50 (H) 1.70 - 7.00 10*3/mm3    Lymphocytes, Absolute 1.46 0.70 - 3.10 10*3/mm3     Monocytes, Absolute 0.98 (H) 0.10 - 0.90 10*3/mm3    Eosinophils, Absolute 0.54 (H) 0.00 - 0.40 10*3/mm3    Basophils, Absolute 0.11 0.00 - 0.20 10*3/mm3    Immature Grans, Absolute 0.04 0.00 - 0.05 10*3/mm3    nRBC 0.0 0.0 - 0.2 /100 WBC     Note: In addition to lab results from this visit, the labs listed above may include labs taken at another facility or during a different encounter within the last 24 hours. Please correlate lab times with ED admission and discharge times for further clarification of the services performed during this visit.    XR Foot 3+ View Right   Final Result      1. Diffuse soft tissue swelling without distinct erosive change or bone destruction. Small focus of subcutaneous gas adjacent to the distal phalanx first digit.   2. Remote fracture deformity of the proximal phalanx fifth digit.      Signer Name: Bernardo Longoria MD    Signed: 12/13/2020 8:15 PM    Workstation Name: [facility]     Radiology [facility]      XR Chest 1 View   Final Result      1. Cardiomegaly and probable mild volume overload. Interval improvement in the appearance of the left lung. No pneumothorax.      Signer Name: Bernardo Longoria MD    Signed: 12/13/2020 8:11 PM    Workstation Name: [facility]     Radiology [facility]        Vitals:    12/13/20 2100 12/13/20 2110 12/13/20 2120 12/13/20 2130   BP: 172/97   173/94   BP Location:       Patient Position:       Pulse: 76 74 75 75   Resp:       Temp:       TempSrc:       SpO2: 93% 93% 94% 93%   Weight:       Height:         Medications   sodium chloride 0.9 % flush 10 mL (has no administration in time range)   amLODIPine (NORVASC) tablet 5 mg (has no administration in time range)   aspirin EC tablet 81 mg (has no administration in time range)   DULoxetine (CYMBALTA) DR capsule 60 mg (has no administration in time range)   furosemide (LASIX) tablet 40 mg (has no administration in time range)   insulin detemir (LEVEMIR) injection 10 Units  (has no administration in time range)   LORazepam (ATIVAN) tablet 1 mg (has no administration in time range)   melatonin tablet 5 mg (has no administration in time range)   metoprolol tartrate (LOPRESSOR) tablet 100 mg (has no administration in time range)   QUEtiapine (SEROquel) tablet 12.5 mg (has no administration in time range)   saccharomyces boulardii (FLORASTOR) capsule 250 mg (has no administration in time range)   spironolactone (ALDACTONE) tablet 25 mg (has no administration in time range)   terazosin (HYTRIN) capsule 10 mg (has no administration in time range)   dextrose (GLUTOSE) oral gel 15 g (has no administration in time range)   dextrose (D50W) 25 g/ 50mL Intravenous Solution 25 g (has no administration in time range)   glucagon (human recombinant) (GLUCAGEN DIAGNOSTIC) injection 1 mg (has no administration in time range)   sodium chloride 0.9 % flush 10 mL (has no administration in time range)   sodium chloride 0.9 % flush 10 mL (has no administration in time range)   acetaminophen (TYLENOL) tablet 650 mg (has no administration in time range)     Or   acetaminophen (TYLENOL) 160 MG/5ML solution 650 mg (has no administration in time range)     Or   acetaminophen (TYLENOL) suppository 650 mg (has no administration in time range)   potassium chloride (MICRO-K) CR capsule 40 mEq (has no administration in time range)     Or   potassium chloride (KLOR-CON) packet 40 mEq (has no administration in time range)     Or   potassium chloride 10 mEq in 100 mL IVPB (has no administration in time range)   Magnesium Sulfate 2 gram Bolus, followed by 8 gram infusion (total Mg dose 10 grams)- Mg less than or equal to 1mg/dL (has no administration in time range)     Or   Magnesium Sulfate 2 gram / 50mL Infusion (GIVE X 3 BAGS TO EQUAL 6GM TOTAL DOSE) - Mg 1.1 - 1.5 mg/dl (has no administration in time range)     Or   Magnesium Sulfate 4 gram infusion- Mg 1.6-1.9 mg/dL (has no administration in time range)   insulin  lispro (humaLOG) injection 0-7 Units (has no administration in time range)   ondansetron (ZOFRAN) tablet 4 mg (has no administration in time range)     Or   ondansetron (ZOFRAN) injection 4 mg (has no administration in time range)   Pharmacy to dose vancomycin (has no administration in time range)   piperacillin-tazobactam (ZOSYN) 3.375 g in iso-osmotic dextrose 50 ml (premix) (has no administration in time range)   clindamycin (CLEOCIN) 600 mg in dextrose 5% 50 mL IVPB (premix) (has no administration in time range)   heparin (porcine) 5000 UNIT/ML injection 5,000 Units (has no administration in time range)   furosemide (LASIX) injection 40 mg (has no administration in time range)   vancomycin 1250 mg/250 mL 0.9% NS IVPB ([facility]) (has no administration in time range)   ! Vanc trough due 07:30 am 12/15 - hold 8am vanc dose until trough reviewed by Pharmacist (has no administration in time range)   vancomycin 2250 mg/500 mL 0.9% NS IVPB ([facility]) (0 mg/kg × 109 kg Intravenous Stopped 12/13/20 2201)   piperacillin-tazobactam (ZOSYN) 3.375 g in iso-osmotic dextrose 50 ml (premix) (0 g Intravenous Stopped 12/13/20 1901)     ECG/EMG Results (last 24 hours)     ** No results found for the last 24 hours. **        ECG 12 Lead                                                    MDM    Final diagnoses:   Diabetes mellitus with foot ulcer and gangrene (CMS/HCC)   Cellulitis, scrotum   S/P BKA (below knee amputation), left (CMS/HCC)       Documentation assistance provided by andrea Hillman.  Information recorded by the scribe was done at my direction and has been verified and validated by me.     Christian Hillman  12/13/20 1758       Florina Corley APRN  12/13/20 2043       Florina Corley APRN  12/13/20 2258

## 2020-12-14 NOTE — DISCHARGE SUMMARY
[facility] Medicine Services  ELOPEMENT AGAINST MEDICAL ADVICE    Patient Name: Kendrick Crystal  : 1970  MRN: [id number]    Date of Admission: 2020  Date of Elopement:  20  Primary Care Physician: Stanley Carrasco DO    Consults     Date and Time Order Name Status Description    2020 2307 Inpatient Palliative Care MD Consult      2020 Inpatient Urology Consult      2020 Inpatient Infectious Diseases Consult Completed     2020 Inpatient Cardiothoracic Surgery Consult          Hospital Course     Presenting Problem:   Diabetic foot ulcer (CMS/[facility]) [E11.621, L97.509]  Diabetic foot ulcer (CMS/[facility]) [E11.621, L97.509]    Active Hospital Problems    Diagnosis  POA   • **Diabetic foot ulcer (CMS/[facility]) [E11.621, L97.509]  Yes   • Decubitus ulcer [L89.90]  Yes   • UTI (urinary tract infection) due to urinary indwelling catheter (CMS/[facility]) [T83.511A, N39.0]  Unknown   • Diastolic CHF, chronic (CMS/[facility]) [I50.32]  Yes   • Bipolar disorder (CMS/[facility]) [F31.9]  Yes   • History of Clostridioides difficile colitis [Z86.19]  Not Applicable   • Urinary retention [R33.9]  Yes   • Elevated troponin [R77.8]  Yes   • Peripheral vascular disease (CMS/[facility]) [I73.9]  Yes   • S/P BKA (below knee amputation), left (CMS/[facility]) [Z89.512]  Not Applicable   • History of MRSA infection [Z86.14]  Yes   • Type 2 diabetes mellitus (CMS/[facility]) [E11.9]  Unknown   • Hypomagnesemia [E83.42]  Yes   • Essential hypertension [I10]  Yes   • Hyperlipemia [E78.5]  Yes   • 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          Hospital Course:  This is a 50-year-old male patient with a PMH significant for diastolic CHF, bipolar disorder, history of CHF, history of urinary retention with indwelling Walker catheter, PVD status post left BKA, diabetes mellitus type 2, HTN, HLD, DUNLAP cirrhosis who  presents to the ED due to right foot wound.    Patient was admitted and started biotics.  He was evaluated by infectious disease as well as surgery.  Patient declined any surgical intervention.  Reviewed with patient that he was on antibiotics as well as his troponin was trending up however he decided that he would like to leave AMA.  When he was discussing leaving AMA I asked him why he came to the hospital and what he was expecting and he did not have an answer.    Patient did meet with palliative care.  His wife was present during all these conversations and she was agreeable for him to leave AMA.    **Patient left AMA prior to completion of evaluation and management**      Day of Discharge     HPI:   **Patient left AMA prior to completion of evaluation and management**    Vital Signs:   Temp:  [97.8 °F (36.6 °C)-98.1 °F (36.7 °C)] 98.1 °F (36.7 °C)  Heart Rate:  [63-82] 66  Resp:  [16-20] 18  BP: (122-181)/() 122/70     Physical Exam (if applicable):  Constitutional: No acute distress, awake, alert; chronically ill appearing   HENT: NCAT, mucous membranes moist  Respiratory: Clear to auscultation bilaterally, respiratory effort normal   Cardiovascular: RRR, II/VI murmurs  Gastrointestinal: Positive bowel sounds, soft, nontender, nondistended  Musculoskeletal: left AKA, right foot bandage   Psychiatric: cooperative but avoids questions   Neurologic: Oriented x 3, strength symmetric in all extremities, Cranial Nerves grossly intact to confrontation, speech clear  Skin: No rashes    Pending Labs     Order Current Status    Blood Culture - Blood, Arm, Left In process    Blood Culture - Blood, Arm, Right In process    Urine Culture - Urine, Urine, Clean Catch In process    Wound Culture - Wound, Scrotum Preliminary result        Discharge Details     Discharge Disposition:  **Patient left AMA prior to completion of evaluation and management, therefore discharge planning remains incomplete including absence of  any needed discharging medications, testing arrangements or follow up unless otherwise specified**    Future Appointments   Date Time Provider Department Center   1/12/2021 12:45 PM Katrina Bustillo APRN [facility] None         33 minutes were spent on reviewing chart, speaking to patient, speaking to consultants, documentation     Kary Dotson DO  12/14/20

## 2020-12-14 NOTE — PLAN OF CARE
"Goal Outcome Evaluation:  Plan of Care Reviewed With: patient, spouse  Progress: no change  Outcome Summary: Palliative consult for GOC/ACP, support to pt and family; pt known to service from previous admissions. Pt was followed by home Palliative services through [facility] after prior admission, transitioned to home Hospice, but revoked that service to pursue aggressive interventions for non-healing wounds. Pt stated that he is going home; explored why he had gone to see Dr. Arriola and why he came to the hospital if he wishes to not pursue interventions, and that making that choice would make hospice care most appropriate as his wounds would likely worsen. Pt did not answer to any of these observations, but asked that he receive a tray as he is hungry. Pt's wife at bedside reported that they would be interested in continuing with Palliative services at home after discharge, not hospice as aggressive interventions would not be covered. Palliative following for continuing GOC discussion and support to pt and wife.    1300 Palliative Team Conference: BRODY Maddox RN, CHPN; JOHNY Cardenas APRN; TEETEE Boles, [facility], [facility]; CHINTAN Blackwood; TEETEE Carrera, RN; SAMAN Masters RN, CHPN    Problem: Palliative Care  Goal: Enhanced Quality of Life  Outcome: Ongoing, Progressing  Intervention: Maximize Comfort  Flowsheets (Taken 12/14/2020 1502)  Pain Management Interventions: (pt reported \"none\" when asked if any pain)   pain management plan reviewed with patient/caregiver   other (see comments)  Intervention: Optimize Function  Flowsheets (Taken 12/14/2020 1502)  Sensory Stimulation Regulation: quiet environment promoted  Fatigue Management: paced activity encouraged  Intervention: Promote Advance Care Planning  Flowsheets (Taken 12/14/2020 1502)  Life Transition/Adjustment:   palliative care discussed   palliative care initiated  Intervention: Optimize Psychosocial Wellbeing  Flowsheets (Taken 12/14/2020 1502)  Spiritual " Activities Assistance: (Spiritual Care consult) other (see comments)  Family/Support System Care:   caregiver stress acknowledged   involvement promoted   self-care encouraged   support provided

## 2020-12-14 NOTE — PROGRESS NOTES
Nutrition Services    Patient Name:  Kendrick Crystal  YOB: 1970  MRN: [id number]  Admit Date:  12/13/2020    Patient triggered for nutrition assessment for non healing wound. Familiar with patient from previous admission (multiple previous hospital admission in the past two years).  Patient has been stating to multiple staff  that he is leaving today - which would be AMA.  Patient has previously accepted premier protein supplement with meals for additional protein support. Will send on tray if patient ends up staying. Will follow up with patient/ complete assessment if patient not discharged today.     Electronically signed by:  Roma Smith RD  12/14/20 13:39 EST

## 2020-12-14 NOTE — PLAN OF CARE
Goal Outcome Evaluation:  Plan of Care Reviewed With: patient  Progress: no change  Outcome Summary: PT initial evaluation completed for pt presenting with generalized weakness, balance deficits, and decreased functional mobility. Pt required maxA x2 for bed mobility. Declined any transfers. Pt's decreased independence warrants PT skilled care. Recommend D/C to SNF.

## 2020-12-14 NOTE — NURSING NOTE
Notified [facility] of pt's and spouse's stated desire for resumption of Palliative home services.

## 2020-12-14 NOTE — PLAN OF CARE
Goal Outcome Evaluation:  Plan of Care Reviewed With: patient  Progress: no change  Outcome Summary: Pt presents with chronic RLE edema with scattered ulcerations.  PT was able to lightly debride wounds to help decrease bioburden and light compression applied to increase venous return to decrease LE edema and improve skin integrity.  Pt Educated on benefit of compression wrapping and wound care to help improve healing potential.

## 2020-12-14 NOTE — CONSULTS
"Stanley Carrasco DO  Consulting physician: Alma Gonzalez  Reason for referral: goc discussion, ACP, pt/family support  Chief Complaint   Patient presents with   • Wound Infection       HPI:   50 y.o. male with a PMH significant for diastolic CHF, bipolar disorder, urinary retention with indwelling Walker catheter, PVD status post left BKA, diabetes mellitus type 2, HTN, HLD, DUNLAP cirrhosis who presents to the ED due to right foot wounds on 12/13/2020.  Wife states that patient has had ongoing chronic right foot wound for several months.  Over the past few weeks the wounds have worsened.  He has been in hospice care and has not sought treatment for foot wounds.  Wife states that last week they discontinued hospice due to desire to treat right foot wound. They presented to Dr. Arriola with CT surgery this past Friday who recommended aggressive wound, IV antibiotics and infectious disease consultation.  Wife reports low-grade fever at home with nausea and dry heaves.    No reports of diarrhea, cough, shortness of breath.  All above per H&P documentation.  Confirmed with [facility] in [facility] patient was admitted into hospice (11/16/2020-12/10/2020).  Symptoms:   Patient reports primary pain is distal RLE and Right foot pain - states feels like the \"phantom pain\".    + tearfulness, emotional - wife shared that patient in August was taken off of duloxetine and placed on sertraline and another medication for his bipolar.   No dyspnea, nausea.   Patient reports is vision continues to diminish more.   Advance care planning discussed:   Code Status:   Current Code Status     Date Active Code Status Order ID Comments User Context       12/13/2020 2148 No CPR [id number]  Alma Gonzalez DO ED     Advance Care Planning Activity      Questions for Current Code Status     Question Answer Comment    Code Status No CPR     Medical Interventions (Level of Support Prior to Arrest) Limited     Limited Support to NOT " Include Cardioversion/Defibrillation      Intubation     Level Of Support Discussed With Patient         Advance Directive: no written document  Surrogate decision maker: wife, Cindy Crystal  Past Medical History:   Diagnosis Date   • Abdominal wall cellulitis 5/20/2019   • JUAN (acute kidney injury) (CMS/HCC) 7/29/2018   • Arthritis    • Bipolar 1 disorder (CMS/HCC)    • Cellulitis of right anterior lower leg 07/29/2018    WITH MRSA   • Chronic kidney disease, stage 3    • Cirrhosis (CMS/HCC)    • Counseling for insulin pump     removed   • Depression    • Diabetes mellitus (CMS/HCC)    • Encephalopathy, hepatic (CMS/HCC)    • H/O degenerative disc disease    • History of Lissa's gangrene     right leg/testicle   • Hyperlipemia    • Hypertension    • MRSA infection    • multiple Skin abscesses    • DUNLAP, bx showed stage IV fibrosis    • Neuropathy    • Peripheral vascular disease (CMS/HCC)    • Thrombophlebitis      Past Surgical History:   Procedure Laterality Date   • ABDOMINAL WALL ABSCESS INCISION AND DRAINAGE N/A 4/26/2019    Procedure: ABDOMINAL WALL DEBRIDEMENT;  Surgeon: Fadi Kern MD;  Location:  [facility];  Service: General   • AMPUTATION DIGIT Left 1/30/2017    Procedure: left fourth and fifth transmetatarsal toe amputation ;  Surgeon: Juancho Martinez MD;  Location:  [facility];  Service:    • BELOW KNEE AMPUTATION Left 3/2/2017    Procedure: AMPUTATION BELOW KNEE, SHMUEL;  Surgeon: Juancho Martinez MD;  Location:  [facility];  Service:    • CYSTOSCOPY N/A 10/16/2019    Procedure: CYSTOSCOPY;  Surgeon: Brad Gutierres MD;  Location:  [facility];  Service: Urology   • CYSTOSCOPY TRANSURETHRAL RESECTION OF PROSTATE N/A 6/18/2020    Procedure: CYSTOSCOPY TRANSURETHRAL RESECTION OF PROSTATIC ABCESS;  Surgeon: Mumtaz Paez MD;  Location:  [facility];  Service: Urology;  Laterality: N/A;   • ENDOSCOPY     • HEMORRHOIDECTOMY N/A 8/2/2019    Procedure: EXCISION AND DRAIN PERIRECTAL ABSCESS;  Surgeon: Emler  Mumtaz PAIZ MD;  Location:  [facility];  Service: General   • INCISION AND DRAINAGE LEG Right 5/27/2020    Procedure: LOWER EXTREMITY DEBRIDEMENT RIGHT;  Surgeon: Juancho Martinez MD;  Location:  [facility];  Service: General;  Laterality: Right;   • LEG SURGERY     • SIGMOIDOSCOPY N/A 6/11/2020    Procedure: SIGMOIDOSCOPY FLEXIBLE;  Surgeon: Mumtaz Payne MD;  Location:  [facility];  Service: General;  Laterality: N/A;   • TESTICLE SURGERY Right     DEBRIDEMENT FROM GANGRENE   • TONSILLECTOMY  1975       Reviewed current scheduled and prn medications for route, type, dose and frequency.    Current Facility-Administered Medications   Medication Dose Route Frequency Provider Last Rate Last Admin   • acetaminophen (TYLENOL) tablet 650 mg  650 mg Oral Q4H PRN Carlos, Alma G, DO        Or   • acetaminophen (TYLENOL) 160 MG/5ML solution 650 mg  650 mg Oral Q4H PRN Carlos, Alma G, DO        Or   • acetaminophen (TYLENOL) suppository 650 mg  650 mg Rectal Q4H PRN Carlos, Alma G, DO       • amLODIPine (NORVASC) tablet 5 mg  5 mg Oral Daily Carlos, Alma G, DO   5 mg at 12/14/20 0816   • aspirin EC tablet 81 mg  81 mg Oral Daily Carlos, Alma G, DO   81 mg at 12/14/20 0815   • dextrose (D50W) 25 g/ 50mL Intravenous Solution 25 g  25 g Intravenous Q15 Min PRN Carlos, Alma G, DO       • dextrose (GLUTOSE) oral gel 15 g  15 g Oral Q15 Min PRN Carlos, Alma G, DO       • DULoxetine (CYMBALTA) DR capsule 60 mg  60 mg Oral Daily Carlos, Alma G, DO   60 mg at 12/14/20 0815   • furosemide (LASIX) tablet 40 mg  40 mg Oral Daily Carlos, Alma G, DO   40 mg at 12/14/20 0815   • glucagon (human recombinant) (GLUCAGEN DIAGNOSTIC) injection 1 mg  1 mg Subcutaneous Q15 Min PRN Carlos, Alma G, DO       • heparin (porcine) 5000 UNIT/ML injection 5,000 Units  5,000 Units Subcutaneous Q12H Carlos, Alma G, DO   5,000 Units at 12/14/20 0815   • insulin detemir (LEVEMIR) injection 10 Units  10 Units Subcutaneous Q12H Alma Gonzalez DO    10 Units at 12/13/20 2323   • insulin lispro (humaLOG) injection 0-7 Units  0-7 Units Subcutaneous TID AC Carlos, Alma G, DO       • LORazepam (ATIVAN) tablet 1 mg  1 mg Oral Q8H PRN Carlos, Alma G, DO   1 mg at 12/13/20 2324   • Magnesium Sulfate 2 gram Bolus, followed by 8 gram infusion (total Mg dose 10 grams)- Mg less than or equal to 1mg/dL  2 g Intravenous PRN Carlos, Alma G, DO        Or   • Magnesium Sulfate 2 gram / 50mL Infusion (GIVE X 3 BAGS TO EQUAL 6GM TOTAL DOSE) - Mg 1.1 - 1.5 mg/dl  2 g Intravenous PRN Carlos, Alma G, DO        Or   • Magnesium Sulfate 4 gram infusion- Mg 1.6-1.9 mg/dL  4 g Intravenous PRN Carlos, Alma G, DO 25 mL/hr at 12/14/20 0646 4 g at 12/14/20 0646   • melatonin tablet 5 mg  5 mg Oral Nightly PRN Carlos, Alma G, DO       • metoprolol tartrate (LOPRESSOR) tablet 100 mg  100 mg Oral Q12H Carlos, Alma G, DO   100 mg at 12/13/20 2324   • ondansetron (ZOFRAN) tablet 4 mg  4 mg Oral Q6H PRN Carlos, Alma G, DO        Or   • ondansetron (ZOFRAN) injection 4 mg  4 mg Intravenous Q6H PRN Carlos, Alma G, DO       • oxyCODONE-acetaminophen (PERCOCET)  MG per tablet 1 tablet  1 tablet Oral BID Carlos, Alma G, DO   1 tablet at 12/14/20 0816   • piperacillin-tazobactam (ZOSYN) 3.375 g in iso-osmotic dextrose 50 ml (premix)  3.375 g Intravenous Q8H Carlos, Alma G, DO   3.375 g at 12/14/20 0205   • potassium chloride (MICRO-K) CR capsule 40 mEq  40 mEq Oral PRN Carlos, Alma G, DO        Or   • potassium chloride (KLOR-CON) packet 40 mEq  40 mEq Oral PRN Carlos, Alma G, DO        Or   • potassium chloride 10 mEq in 100 mL IVPB  10 mEq Intravenous Q1H PRN Carlos, Alma G, DO       • QUEtiapine (SEROquel) tablet 12.5 mg  12.5 mg Oral Nightly Carlos, Alma G, DO   12.5 mg at 12/13/20 2323   • saccharomyces boulardii (FLORASTOR) capsule 250 mg  250 mg Oral BID Carlos, Alma G, DO   250 mg at 12/14/20 0815   • sodium chloride 0.9 % flush 10 mL  10 mL Intravenous PRN Carlos, Alma  G, DO       • sodium chloride 0.9 % flush 10 mL  10 mL Intravenous Q12H CarlosElbaAlma G, DO       • sodium chloride 0.9 % flush 10 mL  10 mL Intravenous PRN CarlosElba ortegasie G, DO       • spironolactone (ALDACTONE) tablet 25 mg  25 mg Oral Daily CarlosIsabel ortegae G, DO   25 mg at 20 0815   • terazosin (HYTRIN) capsule 10 mg  10 mg Oral Nightly CarlosIsabel ortegae G, DO   10 mg at 20 2323     Facility-Administered Medications Ordered in Other Encounters   Medication Dose Route Frequency Provider Last Rate Last Admin   • Chlorhexidine Gluconate Cloth 2 % pads 1 application  1 application Topical Q12H PRN Jewels Infante APRN            •  acetaminophen **OR** acetaminophen **OR** acetaminophen  •  dextrose  •  dextrose  •  glucagon (human recombinant)  •  LORazepam  •  magnesium sulfate **OR** magnesium sulfate **OR** magnesium sulfate  •  melatonin  •  ondansetron **OR** ondansetron  •  potassium chloride **OR** potassium chloride **OR** potassium chloride  •  [COMPLETED] Insert peripheral IV **AND** sodium chloride  •  sodium chloride  No Known Allergies  Family History   Problem Relation Age of Onset   • Arthritis Mother    • Hypertension Mother    • Kidney disease Mother    • Stroke Mother    • Heart attack Father    • Arthritis Father    • Diabetes Father    • Hyperlipidemia Father    • Hypertension Father    • Mental illness Sister    • Diabetes Brother    • Hypertension Brother    • Obesity Brother      Social History     Socioeconomic History   • Marital status:      Spouse name: Not on file   • Number of children: 1   • Years of education: Not on file   • Highest education level: Not on file   Occupational History   • Occupation: Security     Comment: Disabled-Diabetic Retinopathy   Tobacco Use   • Smoking status: Former Smoker     Years: 1000     Types: Cigars     Quit date: 2018     Years since quittin.1   • Smokeless tobacco: Never Used   • Tobacco comment: 4 cigars a year   Substance and  "Sexual Activity   • Alcohol use: No     Frequency: Never   • Drug use: No   • Sexual activity: Defer   Social History Narrative    Lives in [facility] with spouse. Has caregiver daily       Review of Systems - +/- per HPI and symptom review.    PPS: 40%  /77 (BP Location: Right arm, Patient Position: Lying)   Pulse 66   Temp 98.1 °F (36.7 °C) (Oral)   Resp 20   Ht 190.5 cm (75\")   Wt 111 kg (244 lb 9.6 oz)   SpO2 99%   BMI 30.57 kg/m²   111 kg (244 lb 9.6 oz) Body mass index is 30.57 kg/m².  Intake & Output (last day)       12/13 0701 - 12/14 0700 12/14 0701 - 12/15 0700    IV Piggyback 550     Total Intake(mL/kg) 550 (5)     Urine (mL/kg/hr) 675     Total Output 675     Net -125               Physical Exam:  General Appearance: No acute distress, chronically ill appearing  Head: Normocephalic without obvious abnormality, atraumatic  Eyes: Conjunctivae and sclerae normal, no icterus, ALEKSANDRA  Lungs: Clear to auscultation, respirations regular, even and non-labored  Heart: Regular rhythm and normal rate, normal S1  Abdomen: Normal bowel sounds, soft, non-distended, non-tender  Extremities: Left BKA, distal RLE with intact wrapped ace wrap, no edema in upper extremities, no redness, no cyanosis  Pulses: Distal pulses palpable and equal bilaterally  Skin: Warm and dry, no bleeding, bruising or rash  Neurological: Alert, interactive, appropriate conversation,no myoclonus, equal hand  and strength, able to lift lower extremities off bed    Reviewed labs and diagnostic results.  Lab Results   Component Value Date    HGBA1C 6.20 (H) 12/14/2020     Results from last 7 days   Lab Units 12/14/20  0659   WBC 10*3/mm3 11.42*   HEMOGLOBIN g/dL 8.2*   HEMATOCRIT % 28.5*   PLATELETS 10*3/mm3 372     Results from last 7 days   Lab Units 12/14/20  0659 12/13/20  1811   SODIUM mmol/L 137 138   POTASSIUM mmol/L 4.6 4.9   CHLORIDE mmol/L 106 106   CO2 mmol/L 22.0 23.0   BUN mg/dL 24* 24*   CREATININE mg/dL 0.59* " 0.53*   CALCIUM mg/dL 8.8 8.9   BILIRUBIN mg/dL  --  0.5   ALK PHOS U/L  --  250*   ALT (SGPT) U/L  --  10   AST (SGOT) U/L  --  14   GLUCOSE mg/dL 85 113*     Results from last 7 days   Lab Units 12/14/20  0659   SODIUM mmol/L 137   POTASSIUM mmol/L 4.6   CHLORIDE mmol/L 106   CO2 mmol/L 22.0   BUN mg/dL 24*   CREATININE mg/dL 0.59*   GLUCOSE mg/dL 85   CALCIUM mg/dL 8.8     Imaging Results (Last 72 Hours)     Procedure Component Value Units Date/Time    XR Foot 3+ View Right [[id number]] Collected: 12/13/20 2015     Updated: 12/13/20 2017    Narrative:      CR Foot Comp Min 3 Vws RT    INDICATION:   Uncontrolled diabetes and peripheral vascular disease. Gangrenous toes. In the emergency department.    COMPARISON:   None available    FINDINGS:   3 views of the right foot.  No acute fracture. Diffuse degenerative change. Remote appearing fracture deformity of the proximal phalanx fifth digit. Diffuse soft tissue swelling, most prominent along the dorsum of the foot, fifth digit and first digit.  There is a small focus of subcutaneous gas dorsal to the distal phalanx first digit on the lateral view. No focal erosive change identified with plain film technique. No foreign body.      Impression:        1. Diffuse soft tissue swelling without distinct erosive change or bone destruction. Small focus of subcutaneous gas adjacent to the distal phalanx first digit.  2. Remote fracture deformity of the proximal phalanx fifth digit.    Signer Name: Bernardo Longoria MD   Signed: 12/13/2020 8:15 PM   [facility]: [id number]    [facility]    XR Chest 1 View [[id number]] Collected: 12/13/20 2011     Updated: 12/13/20 2013    Narrative:      CR Chest 1 Vw    INDICATION:   Weakness shortness of air and uncontrolled type 2 diabetes. Hypertension and congestive heart failure with peripheral vascular disease in the emergency department tonight.     COMPARISON:    11/2/2020    FINDINGS:  Single portable AP  view(s) of the chest.  Cardiac silhouette is borderline enlarged and stable. Shallow lung expansion with probable mild vascular congestion. Interval decrease in volume of left-sided pleural fluid with persistent atelectasis or  pneumonia in the left base. No pneumothorax. There is some subtle pleural thickening or loculated pleural fluid on the left.      Impression:        1. Cardiomegaly and probable mild volume overload. Interval improvement in the appearance of the left lung. No pneumothorax.    Signer Name: Bernardo Longoria MD   Signed: 12/13/2020 8:11 PM   Workstation Name: [email or web address]    [facility] Problems     Diagnosis   POA   • **Diabetic foot ulcer (CMS/HCC) [E11.621, L97.509]   Yes   • Decubitus ulcer [L89.90]   Yes   • UTI (urinary tract infection) due to urinary indwelling catheter (CMS/HCC) [T83.511A, N39.0]   Unknown   • Diastolic CHF, chronic (CMS/HCC) [I50.32]   Yes   • Bipolar disorder (CMS/HCC) [F31.9]   Yes   • History of Clostridioides difficile colitis [Z86.19]   Not Applicable   • Urinary retention [R33.9]   Yes   • Elevated troponin [R77.8]   Yes   • Peripheral vascular disease (CMS/HCC) [I73.9]   Yes   • S/P BKA (below knee amputation), left (CMS/HCC) [Z89.512]   Not Applicable   • History of MRSA infection [Z86.14]   Yes   • Type 2 diabetes mellitus (CMS/HCC) [E11.9]   Unknown   • Hypomagnesemia [E83.42]   Yes   • Essential hypertension [I10]   Yes   • Hyperlipemia [E78.5]   Yes   • Liver cirrhosis secondary to DUNLAP (CMS/HCC) [K75.81, K74.60]         Impression: 50 y.o. male with severe venous stasis with Right foot and LLE wounds, DUNLAP cirrhosis, DM2, bipolar disorder  Plan:   RLE pain, Right foot pain - neuropathic, gabapentin low dose scheduled    Pruritus - Sarna lotion to itchy areas    Tearful - reviewed medications - reports was recently changed toward the end of summer to sertraline and another medication.     Goals of care - patient  reports he would want to go home  Asked for reason he came to hospital and stated he wanted to have treatment for his Right foot where the ulcers/wounds seem to be getting worse. Recommend he stay in the hospital to be assessed and have treatment provided per wound care and infectious disease.   Talked about changes over the last 5 years and patient's decline in functional status.  Wife mentioned that it seemed difficult when he had a hard time standing and walking but then with amputation and awareness that he may not have a foot to stand and pivot, realizes it is worse.  Becoming aware of increase difficulties if would require Right foot amputation.  Reviewed that gradual functional declines are expected, especially when you are living with several complications of DM and severe vascular disease.    Patient and wife are interested in having community based palliative services, will make referral to [facility] in [facility].  Linda Cardenas, CHINTAN  [phone]  12/14/20  09:30 EST      Time: 60  minutes spent reviewing medical and medication records, assessing and examining patient, discussing with patient and family, answering questions, formulating a plan and documentation of care. > 50% time spent face to face

## 2020-12-14 NOTE — PROGRESS NOTES
Discharge Planning Assessment  [facility]     Patient Name: Kendrick Crystal  MRN: [id number]  Today's Date: 12/14/2020    Admit Date: 12/13/2020    Discharge Needs Assessment     Row Name 12/14/20 1237       Living Environment    Lives With  alone    Current Living Arrangements  home/apartment/condo    Primary Care Provided by  friend;other (see comments) ex wife and care giver    Provides Primary Care For  no one, unable/limited ability to care for self    Family Caregiver if Needed  other (see comments) ex wife and caregiver    Quality of Family Relationships  helpful;involved;supportive    Able to Return to Prior Arrangements  yes       Resource/Environmental Concerns    Resource/Environmental Concerns  none       Transition Planning    Patient/Family Anticipates Transition to  home    Patient/Family Anticipated Services at Transition  none    Transportation Anticipated  family or friend will provide       Discharge Needs Assessment    Readmission Within the Last 30 Days  previous discharge plan unsuccessful;other (see comments) pt has left AMA on previous 2 admission    Equipment Currently Used at Home  shower chair;wheelchair;wheelchair, motorized;hospital bed pt resports his motorized wheelchair is broken    Concerns to be Addressed  discharge planning;patient refuses services;adjustment to diagnosis/illness;compliance issue    Concerns Comments  pt has histroy of non compliance and has left the hospital his ladt 2 admissions AMA.    Equipment Needed After Discharge  other (see comments) pt inquired about transport chair, this is not covered by insurance  provided list of retail stores that carry them and approx cost.    Provided Post Acute Provider List?  N/A    Provided Post Acute Provider Quality & Resource List?  N/A    Current Discharge Risk  lives alone;other (see comments)    Discharge Coordination/Progress  non compliant        Discharge Plan     Row Name 12/14/20 0511       Plan    Plan  home     Patient/Family in Agreement with Plan  yes    Plan Comments  CM spoke with pt at bedside. Pt is reporting he is leaving today, per MD's notes pt will leave AMA. Pt reports he lives alone and has assistance from his ex wife Cindy and a caregiver that comes daily from 1-9pm. Pt reports he needs assistance with adls and has a manual wheelchair, power wheelchair that is broken, hospital bed, handicapp van and shower chair. Pt reports he went home with Hospice previoulsy however has dismissed their services. Pt plans to return home and denies needs at this time, CM will follow for discharge needs.    Final Discharge Disposition Code  01 - home or self-care        Continued Care and Services - Admitted Since 12/13/2020    Coordination has not been started for this encounter.     Selected Continued Care - Prior Encounters Includes selections from prior encounters from 9/14/2020 to 12/14/2020    Discharged on 10/4/2020 Admission date: 9/28/2020 - Discharge disposition: Left Against Medical Advice    Home Medical Care     Service Provider Selected Services Address Phone Fax Patient Preferred    [facility] [address] [place] [id number] [phone] --                      Demographic Summary     Row Name 12/14/20 1237       General Information    Referral Source  admission list    Reason for Consult  discharge planning    Preferred Language  English     Used During This Interaction  no    General Information Comments  PCP- Guille Carrasco       Contact Information    Permission Granted to Share Info With          Functional Status     Row Name 12/14/20 1235       Functional Status    Usual Activity Tolerance  fair    Current Activity Tolerance  -- unbale to assess       Functional Status, IADL    Medications  independent    Meal Preparation  assistive equipment and person    Housekeeping  completely dependent    Laundry  completely  dependent    Shopping  completely dependent    IADL Comments  pt reports he has assistance from caregiver (Medicaid Waiver) and his ex wife.       Mental Status Summary    Recent Changes in Mental Status/Cognitive Functioning  no changes       Employment/    Employment/ Comments  Pt confirms he has Medicare and [place] Medicaid, denies concerns or disruption in coverage. Pt has prescription drug coverage and denies issues obtaining or affording current medications.        Psychosocial    No documentation.       Abuse/Neglect    No documentation.       Legal    No documentation.       Substance Abuse    No documentation.       Patient Forms    No documentation.           Siam Joshua RN

## 2020-12-14 NOTE — THERAPY EVALUATION
Acute Care - Wound/Debridement Initial Evaluation  [facility]     Patient Name: Kendrick Crystal  : 1970  MRN: [id number]  Today's Date: 2020                Admit Date: 2020    Visit Dx:    ICD-10-CM ICD-9-CM   1. Diabetes mellitus with foot ulcer and gangrene (CMS/HCC)  E11.621 250.70    L97.509 250.80    E11.52 785.4     707.15   2. Cellulitis, scrotum  N49.2 608.4   3. S/P BKA (below knee amputation), left (CMS/HCC)  Z89.512 V49.75           Patient Active Problem List   Diagnosis   • Liver cirrhosis secondary to DUNLAP (CMS/HCC)   • Essential hypertension   • Non-compliance   • Hyperlipemia   • Hypertriglyceridemia, essential   • Hypomagnesemia   • Hypertension   • Diabetic foot infection (CMS/HCC)   • Type 2 diabetes mellitus (CMS/HCC)   • History of MRSA infection   • Diabetic ulcer of right lower leg (CMS/HCC)   • S/P BKA (below knee amputation), left (CMS/HCC)   • Peripheral vascular disease (CMS/HCC)   • Abscess of abdominal cavity (CMS/HCC)   • Elevated troponin   • Sepsis (CMS/HCC)   • Leukocytosis   • Urinary retention   • Acute UTI (urinary tract infection)   • Pneumaturia   • Gastroparesis due to secondary diabetes (CMS/HCC)   • History of Clostridioides difficile colitis   • Bipolar disorder (CMS/HCC)   • Type 1 Respiratory Failure   • Diastolic CHF, chronic (CMS/HCC)   • Pleural effusion due to CHF (congestive heart failure) (CMS/[facility])   • Medical non-compliance   • Self neglect   • Fall   • Abscess   • Left thigh pain   • Wound of right leg   • Acute suppurative prostatitis    • Prostate abscess   • Diabetic ketoacidosis without coma associated with type 2 diabetes mellitus (CMS/HCC)   • Clostridium difficile colitis   • Acute on chronic heart failure (CMS/HCC)   • AMS (altered mental status)   • Acute sepsis (CMS/HCC)   • Acute exacerbation of CHF (congestive heart failure) (CMS/HCC)   • Edema leg   • Diabetic foot ulcer (CMS/HCC)   • Decubitus ulcer   • UTI (urinary tract  infection) due to urinary indwelling catheter (CMS/HCC)        Past Medical History:   Diagnosis Date   • Abdominal wall cellulitis 5/20/2019   • JUAN (acute kidney injury) (CMS/HCC) 7/29/2018   • Arthritis    • Bipolar 1 disorder (CMS/HCC)    • Cellulitis of right anterior lower leg 07/29/2018    WITH MRSA   • Chronic kidney disease, stage 3    • Cirrhosis (CMS/HCC)    • Counseling for insulin pump     removed   • Depression    • Diabetes mellitus (CMS/HCC)    • Encephalopathy, hepatic (CMS/HCC)    • H/O degenerative disc disease    • History of Lissa's gangrene     right leg/testicle   • Hyperlipemia    • Hypertension    • MRSA infection    • multiple Skin abscesses    • DUNLAP, bx showed stage IV fibrosis    • Neuropathy    • Peripheral vascular disease (CMS/HCC)    • Thrombophlebitis         Past Surgical History:   Procedure Laterality Date   • ABDOMINAL WALL ABSCESS INCISION AND DRAINAGE N/A 4/26/2019    Procedure: ABDOMINAL WALL DEBRIDEMENT;  Surgeon: Fadi Kern MD;  Location:  [facility];  Service: General   • AMPUTATION DIGIT Left 1/30/2017    Procedure: left fourth and fifth transmetatarsal toe amputation ;  Surgeon: Juancho Martinez MD;  Location:  [facility];  Service:    • BELOW KNEE AMPUTATION Left 3/2/2017    Procedure: AMPUTATION BELOW KNEE, SHMUEL;  Surgeon: Juancho Martinez MD;  Location:  [facility];  Service:    • CYSTOSCOPY N/A 10/16/2019    Procedure: CYSTOSCOPY;  Surgeon: Brad Gutierres MD;  Location:  [facility];  Service: Urology   • CYSTOSCOPY TRANSURETHRAL RESECTION OF PROSTATE N/A 6/18/2020    Procedure: CYSTOSCOPY TRANSURETHRAL RESECTION OF PROSTATIC ABCESS;  Surgeon: Mumtaz Paez MD;  Location:  [facility];  Service: Urology;  Laterality: N/A;   • ENDOSCOPY     • HEMORRHOIDECTOMY N/A 8/2/2019    Procedure: EXCISION AND DRAIN PERIRECTAL ABSCESS;  Surgeon: Mumtaz Payne MD;  Location:  [facility];  Service: General   • INCISION AND DRAINAGE LEG Right 5/27/2020    Procedure: LOWER  EXTREMITY DEBRIDEMENT RIGHT;  Surgeon: Juancho Martinez MD;  Location:  [facility];  Service: General;  Laterality: Right;   • LEG SURGERY     • SIGMOIDOSCOPY N/A 6/11/2020    Procedure: SIGMOIDOSCOPY FLEXIBLE;  Surgeon: Mumtaz Payne MD;  Location:  [facility];  Service: General;  Laterality: N/A;   • TESTICLE SURGERY Right     DEBRIDEMENT FROM GANGRENE   • TONSILLECTOMY  1975         LDA Wound     Row Name 12/14/20 0915             [REMOVED] Wound 07/13/20 1000 Left chest Incision    Wound - Properties Group Placement Date: 07/13/20  -JA Placement Time: 1000  -JA Present on Hospital Admission: N  -JA Side: Left  -JA Location: chest  -JA Primary Wound Type: Incision  -JA, chest tube site  Removal Date: 12/13/20  -KT Removal Time: 2257  -KT    Retired Wound - Properties Group Date first assessed: 07/13/20  -JA Time first assessed: 1000  -JA Present on Hospital Admission: N  -JA Side: Left  -JA Retired Orientation: upper  -JA Location: chest  -JA Primary Wound Type: Incision  -JA, chest tube site  Resolution Date: 12/13/20  -KT Resolution Time: 2257  -KT       Wound 07/07/20 1906 Right anterior;lower leg Diabetic Ulcer    Wound - Properties Group Placement Date: 07/07/20  -AB Placement Time: 1906  -AB Side: Right  -AB Location: leg  -AB Primary Wound Type: Diabetic ulc  -AB    Retired Wound - Properties Group Date first assessed: 07/07/20  -AB Time first assessed: 1906  -AB Side: Right  -AB Retired Orientation: anterior;lower  -AB Location: leg  -AB Primary Wound Type: Diabetic ulc  -AB       Wound 11/15/20 0600 Right lower leg    Wound - Properties Group Placement Date: 11/15/20  -KB Placement Time: 0600  -KB Present on Hospital Admission: Y  -KB Side: Right  -KB Orientation: lower  -KB Location: leg  -KB Stage, Pressure Injury : other (see comments)  -KB, cellulitis     Periwound Care  dry periwound area maintained [facility] [place] to cover   -MF      Retired Wound - Properties Group Date first assessed: 11/15/20   -KB Time first assessed: 0600  -KB Present on Hospital Admission: Y  -KB Side: Right  -KB Location: leg  -KB       [REMOVED] Wound 10/04/20 0000 Right middle finger Skin Tear    Wound - Properties Group Placement Date: 10/04/20  -KG Placement Time: 0000  -KG Present on Hospital Admission: N  -KG Side: Right  -KG Location: middle finger  -KG Primary Wound Type: Skin tear  -KG Additional Comments: Pt kept biting on it  -KG Removal Date: 12/13/20  -KT Removal Time: 2257 -KT    Retired Wound - Properties Group Date first assessed: 10/04/20  -KG Time first assessed: 0000  -KG Present on Hospital Admission: N  -KG Side: Right  -KG Location: middle finger  -KG Primary Wound Type: Skin tear  -KG Additional Comments: Pt kept biting on it  -KG Resolution Date: 12/13/20  -KT Resolution Time: 2257 -KT       Wound 07/07/20 1700 Right distal calf Diabetic Ulcer    Wound - Properties Group Placement Date: 07/07/20  -AB Placement Time: 1700  -AB Side: Right  -AB Location: calf  -AB Primary Wound Type: Diabetic ulc  -AB    Retired Wound - Properties Group Date first assessed: 07/07/20  -AB Time first assessed: 1700  -AB Side: Right  -AB Retired Orientation: distal  -AB Location: calf  -AB Primary Wound Type: Diabetic ulc  -AB       Wound Right anterior groin MASD (Moisture associated skin damage)    Wound - Properties Group Side: Right  -AB Location: groin  -AB Primary Wound Type: MASD  -AB    Retired Wound - Properties Group Side: Right  -AB Retired Orientation: anterior  -AB Location: groin  -AB Primary Wound Type: MASD  -AB       Wound 12/14/20 0945 Right posterior thigh Venous Ulcer    Wound - Properties Group Placement Date: 12/14/20  -RS Placement Time: 0945  -RS Present on Hospital Admission: Y  -RS Side: Right  -RS Orientation: posterior  -RS Location: thigh  -RS Primary Wound Type: Venous ulcer  -RS    Retired Wound - Properties Group Date first assessed: 12/14/20  -RS Time first assessed: 0945  -RS Present on Hospital  Admission: Y  -RS Side: Right  -RS Location: thigh  -RS Primary Wound Type: Venous ulcer  -RS      User Key  (r) = Recorded By, (t) = Taken By, (c) = Cosigned By    Initials Name Provider Type    Usman Johnson, PT Physical Therapist    RS Vini Britton, RN Registered Nurse    Garland Carrillo RN Registered Nurse    Cristal Barnes RN Registered Nurse    Migdalia Truong RNA Registered Nurse    Migdalia Truong, RN Registered Nurse    Melodie Waters RN Registered Nurse    Aleah Granados RN Registered Nurse        Wound 07/07/20 1700 Right distal calf Diabetic Ulcer (Active)   Dressing Appearance open to air 12/14/20 0915   Base moist;pink 12/14/20 0915   Periwound intact 12/14/20 0915   Periwound Temperature warm 12/14/20 0915   Periwound Skin Turgor soft 12/14/20 0915   Edges irregular 12/14/20 0915   Wound Length (cm) 3 cm 12/14/20 0915   Wound Width (cm) 3.2 cm 12/14/20 0915   Wound Depth (cm) 0.1 cm 12/14/20 0915   Drainage Characteristics/Odor serosanguineous 12/14/20 0915   Drainage Amount small 12/14/20 0915   Care, Wound irrigated with;sterile normal saline 12/14/20 0915   Dressing Care foam;low-adherent;silver impregnated;multi-layer wrap 12/14/20 0915       Wound 07/07/20 1906 Right anterior;lower leg Diabetic Ulcer (Active)   Dressing Appearance open to air 12/14/20 0400   Drainage Amount none 12/14/20 0400       Wound Right anterior groin MASD (Moisture associated skin damage) (Active)   Dressing Appearance open to air;moist drainage 12/14/20 1000   Base yeast;yellow;white;moist;red 12/14/20 1000   Periwound yeast;redness;moist 12/14/20 1000   Drainage Characteristics/Odor malodorous;purulent 12/14/20 1000   Drainage Amount scant 12/14/20 1000   Periwound Care dry periwound area maintained 12/14/20 1000       Wound 11/15/20 0600 Right lower leg (Active)   Dressing Appearance open to air 12/14/20 0915   Base black eschar;red;slough;yellow 12/14/20 0915    Periwound dry 12/14/20 0915   Periwound Temperature warm 12/14/20 0915   Periwound Skin Turgor firm 12/14/20 0915   Edges irregular 12/14/20 0915   Drainage Characteristics/Odor serosanguineous 12/14/20 0915   Drainage Amount small 12/14/20 0915   Care, Wound cleansed with;soap and water;debrided 12/14/20 0915   Dressing Care foam;low-adherent;silver impregnated;multi-layer wrap 12/14/20 0915   Periwound Care dry periwound area maintained 12/14/20 0915       Wound 12/14/20 0945 Right posterior thigh Venous Ulcer (Active)   Wound Image   12/14/20 1000   Dressing Appearance open to air 12/14/20 1000   Base blanchable;dermis;pink;moist 12/14/20 1000   Periwound intact;dry;blanchable 12/14/20 1000   Periwound Temperature warm 12/14/20 1000   Periwound Skin Turgor soft 12/14/20 1000   Edges open 12/14/20 1000   Wound Length (cm) 1 cm 12/14/20 1000   Wound Width (cm) 2 cm 12/14/20 1000   Wound Depth (cm) 0.2 cm 12/14/20 1000   Drainage Amount none 12/14/20 1000     Lymphedema     Row Name 12/14/20 0915             Lymphedema Edema Assessment    Ptting Edema Category  By severity  -      Pitting Edema  Severe  -         Skin Changes/Observations    Location/Assessment  Lower Extremity  -      Lower Extremity Conditions  right:;dry;scaly;scab(s);inflamed;weeping  -      Lower Extremity Color/Pigment  right:;erythema  -         Lymphedema Pulses/Capillary Refill    Lymphedema Pulses/Capillary Refill  lower extremity pulses;capillary refill  -      Dorsalis Pedis Pulse  right: unable to assess through edema  -      Capillary Refill  lower extremity capillary refill  -      Lower Extremity Capillary Refill  right:;less than 3 seconds  -         Compression/Skin Care    Compression/Skin Care  skin care;wrapping location;bandaging  -      Skin Care  washed/dried;lotion applied;moisturizing lotion applied  -      Wrapping Location  lower extremity  -      Wrapping Location LE  right:;foot to knee  -    "   Wrapping Comments  Ag foam to cover open wounds and blisters with cast padding to secure with 4\" coban for light multilayered compression.   -      Bandage Layers  cotton liner;short-stretch bandages (comment size/quantity)  -        User Key  (r) = Recorded By, (t) = Taken By, (c) = Cosigned By    Initials Name Provider Type     Usman Johnson, PT Physical Therapist          WOUND DEBRIDEMENT  Total area of Debridement: ~20cm2  Debridement Site 1  Location- Site 1: RLE wound  Selective Debridement- Site 1: Wound Surface <20cmsq  Instruments- Site 1: tweezers, scapel, #15  Excised Tissue Description- Site 1: minimum, slough, eschar  Bleeding- Site 1: none                  PT Assessment (last 12 hours)      PT Evaluation and Treatment     Row Name 12/14/20 0915          Physical Therapy Time and Intention    Subjective Information  complains of;weakness;fatigue  -     Document Type  evaluation;wound care;therapy note (daily note)  -     Mode of Treatment  individual therapy;physical therapy  -     Row Name 12/14/20 0915          General Information    Patient Profile Reviewed  yes  -     Patient Observations  alert;cooperative;agree to therapy  -     Pertinent History of Current Functional Problem  Pt with significant ulcerations and edema to RLE.   -     Risks Reviewed  patient:;increased discomfort  -     Benefits Reviewed  patient:;improve skin integrity  -     Barriers to Rehab  medically complex;previous functional deficit;physical barrier  -     Row Name 12/14/20 0915          Pain    Additional Documentation  Pain Scale: FACES Pre/Post-Treatment (Group)  -     Row Name 12/14/20 0915          Pain Scale: FACES Pre/Post-Treatment    Pain: FACES Scale, Pretreatment  2-->hurts little bit  -     Posttreatment Pain Rating  2-->hurts little bit  -     Pain Location - Side  Right  -     Pain Location - Orientation  lower  -     Pain Location  extremity  -     Row Name       "       Wound 07/07/20 1906 Right anterior;lower leg Diabetic Ulcer    Wound - Properties Group Placement Date: 07/07/20  -AB Placement Time: 1906  -AB Side: Right  -AB Location: leg  -AB Primary Wound Type: Diabetic ulc  -AB    Retired Wound - Properties Group Date first assessed: 07/07/20  -AB Time first assessed: 1906 -AB Side: Right  -AB Retired Orientation: anterior;lower  -AB Location: leg  -AB Primary Wound Type: Diabetic ulc  -AB    Row Name 12/14/20 0915          Wound 11/15/20 0600 Right lower leg    Wound - Properties Group Placement Date: 11/15/20  -KB Placement Time: 0600  -KB Present on Hospital Admission: Y  -KB Side: Right  -KB Orientation: lower  -KB Location: leg  -KB Stage, Pressure Injury : other (see comments)  -KB, cellulitis     Dressing Appearance  open to air  -MF     Base  black eschar;red;slough;yellow  -MF     Periwound  dry  -MF     Periwound Temperature  warm  -MF     Periwound Skin Turgor  firm  -MF     Edges  irregular  -MF     Wound Length (cm)  -- multiple scattered ulcerations and blisters to foot and LE.   -MF     Drainage Characteristics/Odor  serosanguineous  -MF     Drainage Amount  small  -MF     Care, Wound  cleansed with;soap and water;debrided  -MF     Dressing Care  foam;low-adherent;silver impregnated;multi-layer wrap Optifoam Ag to cover  -MF     Retired Wound - Properties Group Date first assessed: 11/15/20  -KB Time first assessed: 0600  -KB Present on Hospital Admission: Y  -KB Side: Right  -KB Location: leg  -KB    Row Name 12/14/20 0915          Wound 07/07/20 1700 Right distal calf Diabetic Ulcer    Wound - Properties Group Placement Date: 07/07/20  -AB Placement Time: 1700  -AB Side: Right  -AB Location: calf  -AB Primary Wound Type: Diabetic ulc  -AB    Dressing Appearance  open to air  -MF     Base  moist;pink  -MF     Periwound  intact  -MF     Periwound Temperature  warm  -MF     Periwound Skin Turgor  soft  -MF     Edges  irregular  -MF     Wound Length (cm)   3 cm  -MF     Wound Width (cm)  3.2 cm  -MF     Wound Depth (cm)  0.1 cm  -MF     Drainage Characteristics/Odor  serosanguineous  -MF     Drainage Amount  small  -MF     Care, Wound  irrigated with;sterile normal saline  -MF     Dressing Care  foam;low-adherent;silver impregnated;multi-layer wrap  -MF     Retired Wound - Properties Group Date first assessed: 07/07/20  -AB Time first assessed: 1700  -AB Side: Right  -AB Retired Orientation: distal  -AB Location: calf  -AB Primary Wound Type: Diabetic ulc  -AB    Row Name             Wound Right anterior groin MASD (Moisture associated skin damage)    Wound - Properties Group Side: Right  -AB Location: groin  -AB Primary Wound Type: MASD  -AB    Retired Wound - Properties Group Side: Right  -AB Retired Orientation: anterior  -AB Location: groin  -AB Primary Wound Type: MASD  -AB    Row Name             Wound 12/14/20 0945 Right posterior thigh Venous Ulcer    Wound - Properties Group Placement Date: 12/14/20  -RS Placement Time: 0945  -RS Present on Hospital Admission: Y  -RS Side: Right  -RS Orientation: posterior  -RS Location: thigh  -RS Primary Wound Type: Venous ulcer  -RS    Retired Wound - Properties Group Date first assessed: 12/14/20  -RS Time first assessed: 0945  -RS Present on Hospital Admission: Y  -RS Side: Right  -RS Location: thigh  -RS Primary Wound Type: Venous ulcer  -RS    Row Name 12/14/20 0915          Coping    Observed Emotional State  calm;cooperative  -     Verbalized Emotional State  acceptance  -     Trust Relationship/Rapport  care explained  -     Row Name 12/14/20 0915          Plan of Care Review    Plan of Care Reviewed With  patient  -MF     Outcome Summary  Pt presents with chronic RLE edema with scattered ulcerations.  PT was able to lightly debride wounds to help decrease bioburden and light compression applied to increase venous return to decrease LE edema and improve skin integrity.  Pt Educated on benefit of compression  wrapping and wound care to help improve healing potential.   -     Row Name 12/14/20 0915          Physical Therapy Goals    Wound Care Goal Selection (PT)  wound care, PT goal 1  -     Row Name 12/14/20 0915          Wound Care Goal 1 (PT)    Wound Care Goal 1 (PT)  Pt to have no eschar covering LE wounds to improve healing potential.   -     Time Frame (Wound Care Goal 1, PT)  10 days  -     Row Name 12/14/20 0915          Positioning and Restraints    Pre-Treatment Position  in bed  -     Post Treatment Position  bed  -     In Bed  supine;call light within reach  -     Row Name 12/14/20 0915          Therapy Assessment/Plan (PT)    Patient/Family Therapy Goals Statement (PT)  go home  -     PT Diagnosis (PT)  RLE venous stasis ulcerations  -     Rehab Potential (PT)  fair, will monitor progress closely  -     Criteria for Skilled Interventions Met (PT)  yes;meets criteria;skilled treatment is necessary  -     Row Name 12/14/20 0915          PT Evaluation Complexity    History, PT Evaluation Complexity  3 or more personal factors and/or comorbidities  -     Examination of Body Systems (PT Eval Complexity)  total of 4 or more elements  -     Clinical Presentation (PT Evaluation Complexity)  evolving  -     Clinical Decision Making (PT Evaluation Complexity)  moderate complexity  -     Overall Complexity (PT Evaluation Complexity)  moderate complexity  -     Row Name 12/14/20 0915          Therapy Plan Review/Discharge Plan (PT)    Therapy Plan Review (PT)  evaluation/treatment results reviewed;care plan/treatment goals reviewed;risks/benefits reviewed;current/potential barriers reviewed;participants voiced agreement with care plan;participants included;patient  -       User Key  (r) = Recorded By, (t) = Taken By, (c) = Cosigned By    Initials Name Provider Type    MF Usman Johnson, PT Physical Therapist    RS Vini Britton, RN Registered Nurse    Cristal Barnes  IZABEL, RN Registered Nurse    Aleah Granados RN Registered Nurse            Recommendation and Plan     Plan of Care Reviewed With: patient           Outcome Summary: Pt presents with chronic RLE edema with scattered ulcerations.  PT was able to lightly debride wounds to help decrease bioburden and light compression applied to increase venous return to decrease LE edema and improve skin integrity.  Pt Educated on benefit of compression wrapping and wound care to help improve healing potential.   Plan of Care Reviewed With: patient            Time Calculation  PT Charges     Row Name 12/14/20 0915             Time Calculation    Start Time  0915  -      PT Goal Re-Cert Due Date  12/24/20  -        User Key  (r) = Recorded By, (t) = Taken By, (c) = Cosigned By    Initials Name Provider Type     Usman Johnson, PT Physical Therapist            Therapy Charges for Today     Code Description Service Date Service Provider Modifiers Qty    72813731869 HC PT MULTI LAYER COMP SYS BELOW KNEE 12/14/2020 Usman Johnson, PT GP 1    01366918869 HC SANTINO DEBRIDE OPEN WOUND UP TO 20CM 12/14/2020 Usman Johnson, PT GP 1    [id number] HC PT EVAL MOD COMPLEXITY 4 12/14/2020 Usman Johnson, PT GP 1                    Usman Johnson, PT  12/14/2020

## 2020-12-14 NOTE — CONSULTS
Kendrick Crystal  1970  [id number]    Date of Consult: 2020    Admit Date:  2020      Requesting Provider: No ref. provider found  Evaluating Physician: Usman Dong MD    Chief Complaint: right foot infection    Reason for Consultation: right foot infection    History of present illness:     Patient is a 50 y.o.  Yr old male with history of poorly contolled T2DM, DUNLAP/liver cirrhosis, HTN, PVD/Left BKA, and multiple skin abscesses/MRSA who presented to [facility] ED with right shin wound infection summer 2018.  He was unable to get to see Dr. Martinez as his father passed away unexpectedly on 18 and he had to wait until after the . The wound worsened becoming gangrenous and he came to [facility] ED in 2018.  He also had been hospitalized at [facility] and then transferred to  for a severe penis/scrotum infection requiring surgical debridement in summer 2018.  He received antibiotics regarding his right leg infection, generally improved over the remainder of summer 2018 but that area had not completely healed. He was readmitted 2019 with acute left lower abdominal wall redness/swelling and pain, spontaneous drainage associated with fever/chills and uncontrolled blood sugars.   I&D requiring wide debridement , severe/deep infection and transferred to [facility] on May 3, 2019 with IV Zosyn/oral doxycycline. Cultures had lactobacillus and mixed gram-positive skin sherly including alpha strep, staph epidermidis and corynebacterium. Readmitted to [facility] May 18, 2019, increasing generalized edema ultimately transitioned to oral doxycycline and healed.     Readmitted 2019 with acute rectal pain that had begun ; this is associated with constipation for days, chills and nausea/dry heaving.  White blood cell count elevated, high hemoglobin A1c over 13, urinalysis with hematuria/pyuria and CT scan with 3 x 3 cm fluid collection  "anterior to the distal rectum and per discussion with Dr. Akbar/Jennifer, this was not in the prostate.  Colorectal surgery/urology saw him for consideration of surgical options/timing/threshold, etc..     8/2/19 I&Dper Dr Payne perirectal abscess; patient reports that he had instrumented his rectum prior to hospitalization with enema     8/3/19 urinary retention overnight requiring in/out catheterization per patient.  Creat climb     8/4/19 further creat climb; childs placed and lisinopril stopped and d/w Dr Rubio;  ?obstruction initially associated with retention postop (1200 out with I/O cath postop per nursing) -v- contrast from CT -v- lisinopril/medication/vancomycin -v- relative hypotension -v- likely combination of factors with ATN; nephrology following; vancomycin trough high and vancomycin stopped empirically 8/3 although high trough potentially accumulation as a consequence of diminishing renal function associated with retention/contrast/pressure rather than cause.  Nonetheless, opted to avoid at that time; creatinine trended down.      8/7/19 in retrospect he remembers air in his urine at admit which has raised concern for possible fistula from urinary tract;  No fecaluria and culture from abscess is fecal sherly;  ?rectal-urethral fistula;  Dr Payne aware     Culture with E. coli/Klebsiella species and creatinine generally trended down, transitioned to oral antibiotics at discharge.     Readmitted August 17, 2019 with nausea/vomiting/dry heaves for 3 days.  Childs catheter was placed and CT scan of the abdomen showed:      \"Previously seen fluid collection identified inferior to the prostate gland is more increased in density on today's examination. There is wall thickening again seen throughout the bladder. Findings again are concerning for cystitis.\" per radiology     He was transitioned to oral omnicef/topical antifungal on approx 8/23/19; Noncompliant with f/u in our office     READMITTED 10/8/19 RLQ abd " pain, urinary retention, nausea, dysuria and generalized fatigue     UTI by U/A, subsequent blood cultures positive for  kleb sp, urine with kleb and e coli ;  Abnormal CT abd with right perinephric fluid collection, advanced cirrhosis, urinary bladder thickening.  10/9 Aspirate of perinephric fluid collection consistent with abscess/kleb and white blood cells; 10/16/19 cytoscopy with bullous change per Dr Gutierres but no fistula per him; 10/19/19 intermittent nausea, no vomiting or dry heaves this am, intermittent dry cough broadened to zosyn with ?aspiration pneumonia evolving after dry heaves/vomiting and had intermittent diarrhea, oral vancomycin added empirically with history of prior C. Difficile; improved with IV Zosyn/oral vancomycin and he refused consideration of placement.  He insisted on home, discharged October 23 and noncompliant with outpatient therapy and outpatient follow-up October 25.  He had become fatigued such that his family could not assist him out of bed and it was recommended by my office that he return to the emergency room October 25.  He apparently refused that but did come to the emergency room October 26.     READMITTED October 26, 2019 with generalized weakness, fatigue/malaise and nausea/vomiting/diarrhea.  CT scan revealed increased size of perinephric fluid collection per radiology. 10/29 drain placed; 11/5/19 drain inadvertently out overnight per him;11/8 voiding cystourethrogram per urology; I d/w Dr Escobedo 11/11 and he reviewed the US from 11/8 and he reports to me US is adequate for followup on this and NO current evidence for abscess, no need for CT scan at that time to evaluate abscess per d/w him and given clinical improvements/radiographic improvements; finished abx prior to discharge 11/14 and noncompliant with physical therapy, he refused placement and insisted on home.     READMITTED 11/18/19 with multifactorial complaints.  He reported generalized fatigue and inability to  "move himself around, increased dyspnea with dry cough and dry heaves, urinary frequency/retention and small amounts of urination; noted to have leukocytosis, elevated troponin/BNP, with hematuria/pyuria and bacteriuria/small yeast; chest x-ray with increased pulmonary vascularity bilaterally and ill-defined opacification left lung base with small left pleural effusion per radiology.  CT abdomen with large pleural effusions with dependent airspace disease, likely atelectasis per radiology and limited ability to evaluate for right sided perinephric abscess per radiology     11/22 CT chest no focal consolidation per radiology; 11/23/19 JUNA after diuresis; later in November he was discharged with oral antibiotics but noncompliant with follow-up.     Readmitted June 7, 2020 with inability to urinate, dysuria and nausea/dry heaves with subjective fever.  Had severe leukocytosis with acute pyuria/hematuria concerning for UTI and subsequent urinary culture with gram-negative lon.  CT scan of the abdomen/pelvis with concern for acute suppurative prostatitis and possible prostatic abscess per radiology in addition to soft tissue thickening extending to the left lower pelvic sidewall.  No evidence for urinary tract obstruction with indwelling Walker catheter.  No specific mention of perinephric collection and no specific focal bowel abnormality described per radiology     He had inability to urinate with hesitancy and dysuria.       6/11 flex sig by Dr Payne     6/18/20 surgery by Dr Paez:  \"PROCEDURE:  Cystoscopy with transurethral resection of prostatic abscess and prostate obtaining tissue culture and permanent pathology.\"     Readmitted July 7, 2020; Per Dr Garza Consult: Prolonged hospital course with numerous complications.  He was followed by Dr. Dong during most of the hospital stay.  He had a prostate abscess which was resected on 6/18.  Culture is polymicrobial with Klebsiella, Candida glabrata and lactobacillus. "  He was treated with IV Unasyn, Flagyl, micafungin, also oral vancomycin due to history of C. Difficile.  Eventually he was deemed appropriate for transfer to skilled nursing facility but patient refused placement.  Medicare would no longer pay for his hospital admission so patient requested discharge home.  He left the hospital on 7/3 on oral antibiotics: Augmentin and oral vancomycin, with plans to discuss transition to hospice as an outpatient given his overall poor prognosis and reluctance to comply with recommendations.  He was admitted to [facility] on 7/7 where a CTA of the chest was negative for PE but had severe  bilateral atelectasis and bilateral pleural effusions.  CT of the abdomen was concerning for multiloculated pelvic abscesses and possibly splenic microabscesses. Received polymicrobial coverage, moved out of ICU with pigtail drain in place regarding pleural effusion and eventually had repeat imaging. 7/16 CT with no abscess per radiology; 7/23/20  Noncompliance continues, refusing placement, refusing oral vancomycin and general noncompliant behavior; he eventually was tapered to ceftriaxone/Mycamine and oral vancomycin solution, transitioned to facility.  In August he was D escalated to oral Omnicef/vancomycin solution and seen August 17 with general clinical improvements.  Noncompliant with 2 subsequent outpatient visits.     Readmitted to [facility] on September 28, 2020; he had reported feeling fatigued and generally not feeling well, reports by family  Of confusion although at the time of my consultation he is oriented to person/place/year,, family and situation.  He has had urinary frequency with dysuria, diarrhea intermittently although he is unable to quantify, and subjective fever.  He is placed on empiric antimicrobials. Noted to have bilateral pleural effusions with pulmonary edema,  Hematuria/pyuria with concern for recurrent UTI     LEFT  AMA  ;  subsequently placed in hospice care     Saw Dr. Arriola on December 11, and admission recommended but patient refused. He later presented to the emergency room on December 13, admitted  for care regarding right foot, black discoloration at all toes with open wound on the dorsal side with drainage.  Vague redness/swelling of the foot itself.  She has pain which is intermittent, sharp at times, worse with pressure, better with pain meds and currently 3 out of 10, not progressive.  He denies any specific new exposure.  Denies new blunt force or penetrating trauma.  No animal insect or arthropod bite.  No fresh/brackish/salt water exposure. In addition, current indwelling Walker catheter and denies any new  Blood in the urine or flank pain.    On December 14, threatening to leave AGAINST MEDICAL ADVICE    No diarrhea. No flank pain.   denies nausea or diarrhea at present. denies cough or hemoptysis.  No rash.  No vomiting.  No abdominal pain.  no headache photophobia or neck stiffness.      No other particular exposures that he can relate    Past Medical History:   Diagnosis Date   • Abdominal wall cellulitis 5/20/2019   • JUAN (acute kidney injury) (CMS/HCC) 7/29/2018   • Arthritis    • Bipolar 1 disorder (CMS/HCC)    • Cellulitis of right anterior lower leg 07/29/2018    WITH MRSA   • Chronic kidney disease, stage 3    • Cirrhosis (CMS/HCC)    • Counseling for insulin pump     removed   • Depression    • Diabetes mellitus (CMS/HCC)    • Encephalopathy, hepatic (CMS/HCC)    • H/O degenerative disc disease    • History of Lissa's gangrene     right leg/testicle   • Hyperlipemia    • Hypertension    • MRSA infection    • multiple Skin abscesses    • DUNLAP, bx showed stage IV fibrosis    • Neuropathy    • Peripheral vascular disease (CMS/HCC)    • Thrombophlebitis        Past Surgical History:   Procedure Laterality Date   • ABDOMINAL WALL ABSCESS INCISION AND DRAINAGE N/A 4/26/2019    Procedure: ABDOMINAL WALL DEBRIDEMENT;   Surgeon: Fadi Kern MD;  Location:  [facility];  Service: General   • AMPUTATION DIGIT Left 1/30/2017    Procedure: left fourth and fifth transmetatarsal toe amputation ;  Surgeon: Juancho Martinez MD;  Location:  [facility];  Service:    • BELOW KNEE AMPUTATION Left 3/2/2017    Procedure: AMPUTATION BELOW KNEE, SHMUEL;  Surgeon: Juancho Martinez MD;  Location:  [facility];  Service:    • CYSTOSCOPY N/A 10/16/2019    Procedure: CYSTOSCOPY;  Surgeon: Brad Gutierres MD;  Location:  [facility];  Service: Urology   • CYSTOSCOPY TRANSURETHRAL RESECTION OF PROSTATE N/A 6/18/2020    Procedure: CYSTOSCOPY TRANSURETHRAL RESECTION OF PROSTATIC ABCESS;  Surgeon: Mumtaz Paez MD;  Location:  [facility];  Service: Urology;  Laterality: N/A;   • ENDOSCOPY     • HEMORRHOIDECTOMY N/A 8/2/2019    Procedure: EXCISION AND DRAIN PERIRECTAL ABSCESS;  Surgeon: Mumtaz Payne MD;  Location:  [facility];  Service: General   • INCISION AND DRAINAGE LEG Right 5/27/2020    Procedure: LOWER EXTREMITY DEBRIDEMENT RIGHT;  Surgeon: Juancho Martinez MD;  Location:  [facility];  Service: General;  Laterality: Right;   • LEG SURGERY     • SIGMOIDOSCOPY N/A 6/11/2020    Procedure: SIGMOIDOSCOPY FLEXIBLE;  Surgeon: Mumtaz Payne MD;  Location:  [facility];  Service: General;  Laterality: N/A;   • TESTICLE SURGERY Right     DEBRIDEMENT FROM GANGRENE   • TONSILLECTOMY  1975       Pediatric History   Patient Parents   • Not on file     Other Topics Concern   • Not on file   Social History Narrative    Lives in [facility] with spouse. Has caregiver daily       family history includes Arthritis in his father and mother; Diabetes in his brother and father; Heart attack in his father; Hyperlipidemia in his father; Hypertension in his brother, father, and mother; Kidney disease in his mother; Mental illness in his sister; Obesity in his brother; Stroke in his mother.    No Known Allergies    Medication:  Current Facility-Administered Medications    Medication Dose Route Frequency Provider Last Rate Last Admin   • acetaminophen (TYLENOL) tablet 650 mg  650 mg Oral Q4H PRN Carlos, Alma G, DO        Or   • acetaminophen (TYLENOL) 160 MG/5ML solution 650 mg  650 mg Oral Q4H PRN Carlos, Alma G, DO        Or   • acetaminophen (TYLENOL) suppository 650 mg  650 mg Rectal Q4H PRN Carlos, Alma G, DO       • amLODIPine (NORVASC) tablet 5 mg  5 mg Oral Daily Carlos, Alma G, DO   5 mg at 12/14/20 0816   • aspirin EC tablet 81 mg  81 mg Oral Daily Carlos, Alma G, DO   81 mg at 12/14/20 0815   • clindamycin (CLEOCIN) 600 mg in dextrose 5% 50 mL IVPB (premix)  600 mg Intravenous Q8H Carlos, Alma G, DO   600 mg at 12/14/20 0816   • dextrose (D50W) 25 g/ 50mL Intravenous Solution 25 g  25 g Intravenous Q15 Min PRN Carlos, Alma G, DO       • dextrose (GLUTOSE) oral gel 15 g  15 g Oral Q15 Min PRN Carlos, Alma G, DO       • DULoxetine (CYMBALTA) DR capsule 60 mg  60 mg Oral Daily Carlos, Alma G, DO   60 mg at 12/14/20 0815   • furosemide (LASIX) tablet 40 mg  40 mg Oral Daily Carlos, Alma G, DO   40 mg at 12/14/20 0815   • glucagon (human recombinant) (GLUCAGEN DIAGNOSTIC) injection 1 mg  1 mg Subcutaneous Q15 Min PRN Carlos, Alma G, DO       • heparin (porcine) 5000 UNIT/ML injection 5,000 Units  5,000 Units Subcutaneous Q12H Carlos, Alma G, DO   5,000 Units at 12/14/20 0815   • insulin detemir (LEVEMIR) injection 10 Units  10 Units Subcutaneous Q12H Carlos, Alma G, DO   10 Units at 12/13/20 2323   • insulin lispro (humaLOG) injection 0-7 Units  0-7 Units Subcutaneous TID AC Carlos, Alma G, DO       • LORazepam (ATIVAN) tablet 1 mg  1 mg Oral Q8H PRN Carlos, Alma G, DO   1 mg at 12/13/20 5028   • Magnesium Sulfate 2 gram Bolus, followed by 8 gram infusion (total Mg dose 10 grams)- Mg less than or equal to 1mg/dL  2 g Intravenous PRN Alma Gonzalez G, DO        Or   • Magnesium Sulfate 2 gram / 50mL Infusion (GIVE X 3 BAGS TO EQUAL 6GM TOTAL DOSE) - Mg 1.1 - 1.5  mg/dl  2 g Intravenous PRN Carlos, Alma G, DO        Or   • Magnesium Sulfate 4 gram infusion- Mg 1.6-1.9 mg/dL  4 g Intravenous PRN Carlos, Alma G, DO 25 mL/hr at 12/14/20 0646 4 g at 12/14/20 0646   • melatonin tablet 5 mg  5 mg Oral Nightly PRN Carlos, Alma G, DO       • metoprolol tartrate (LOPRESSOR) tablet 100 mg  100 mg Oral Q12H Carlos, Alma G, DO   100 mg at 12/13/20 2324   • ondansetron (ZOFRAN) tablet 4 mg  4 mg Oral Q6H PRN Carlos, Alma G, DO        Or   • ondansetron (ZOFRAN) injection 4 mg  4 mg Intravenous Q6H PRN Carlos, Alma G, DO       • oxyCODONE-acetaminophen (PERCOCET)  MG per tablet 1 tablet  1 tablet Oral BID Carlos, Alma G, DO   1 tablet at 12/14/20 0816   • piperacillin-tazobactam (ZOSYN) 3.375 g in iso-osmotic dextrose 50 ml (premix)  3.375 g Intravenous Q8H Carlos, Alma G, DO   3.375 g at 12/14/20 0205   • potassium chloride (MICRO-K) CR capsule 40 mEq  40 mEq Oral PRN Carlos, Alma G, DO        Or   • potassium chloride (KLOR-CON) packet 40 mEq  40 mEq Oral PRN Carlos, Alma G, DO        Or   • potassium chloride 10 mEq in 100 mL IVPB  10 mEq Intravenous Q1H PRN Carlos, Alma G, DO       • QUEtiapine (SEROquel) tablet 12.5 mg  12.5 mg Oral Nightly Carlos, Alma G, DO   12.5 mg at 12/13/20 2323   • saccharomyces boulardii (FLORASTOR) capsule 250 mg  250 mg Oral BID Carlos, Alma G, DO   250 mg at 12/14/20 0815   • sodium chloride 0.9 % flush 10 mL  10 mL Intravenous PRN Carlos, Alma G, DO       • sodium chloride 0.9 % flush 10 mL  10 mL Intravenous Q12H Carlos, Alma G, DO       • sodium chloride 0.9 % flush 10 mL  10 mL Intravenous PRN Alma Gonzalez G, DO       • spironolactone (ALDACTONE) tablet 25 mg  25 mg Oral Daily Alma Gonzalez DO   25 mg at 12/14/20 0815   • terazosin (HYTRIN) capsule 10 mg  10 mg Oral Nightly Alma Gonzalez, DO   10 mg at 12/13/20 9547     Facility-Administered Medications Ordered in Other Encounters   Medication Dose Route Frequency Provider  Last Rate Last Admin   • Chlorhexidine Gluconate Cloth 2 % pads 1 application  1 application Topical Q12H PRN Jewels Infante APRN           Antibiotics:  Anti-Infectives (From admission, onward)    Ordered     Dose/Rate Route Frequency Start Stop    12/13/20 2240  piperacillin-tazobactam (ZOSYN) 3.375 g in iso-osmotic dextrose 50 ml (premix)     [facility] reviewed the order on 12/14/20 0742.   Ordering Provider: Alma Gonzalez, DO    3.375 g  over 4 Hours Intravenous Every 8 Hours 12/14/20 0200 12/19/20 0159    12/13/20 2240  clindamycin (CLEOCIN) 600 mg in dextrose 5% 50 mL IVPB (premix)     [facility] Campos [facility] reviewed the order on 12/14/20 0742.   Ordering Provider: Alma Gonzalez G, DO    600 mg Intravenous Every 8 Hours 12/14/20 0000 12/18/20 2359    12/13/20 1744  vancomycin 2250 mg/500 mL 0.9% NS IVPB ([facility])     Ordering Provider: Florina Corley APRN    20 mg/kg × 109 kg Intravenous Once 12/13/20 1746 12/13/20 2201    12/13/20 1744  piperacillin-tazobactam (ZOSYN) 3.375 g in iso-osmotic dextrose 50 ml (premix)     Ordering Provider: Florina Corley APRN    3.375 g  over 30 Minutes Intravenous Once 12/13/20 1745 12/13/20 1901            Review of Systems    Constitutional--fever better and nochills or sweats.  Appetite diminished with fatigue  Heent-- No new vision, hearing or throat complaints.  No epistaxis or oral sores.  Denies odynophagia or dysphagia.  No flashers, floaters or eye pain. No odynophagia or dysphagia. No headache, photophobia or neck stiffness.  CV-- No chest pain, palpitation or syncope  Resp-- as above  GI-  No hematochezia, melena, or hematemesis. Denies jaundice or chronic liver disease.  --as above  Lymph- no swollen lymph nodes in neck/axilla or groin.   Heme- No active bruising or bleeding; no Hx of DVT or PE.  MS-- no swelling or pain in the bones or joints of arms/legs aside from above.  No new back pain.  Neuro-- No acute focal weakness or numbness in the arms  "or legs.  No seizures.     Full 12 point review of systems reviewed and negative otherwise for acute complaints, except for above        Physical Exam:   Vital Signs   /77 (BP Location: Right arm, Patient Position: Lying)   Pulse 66   Temp 98.1 °F (36.7 °C) (Oral)   Resp 20   Ht 190.5 cm (75\")   Wt 111 kg (244 lb 9.6 oz)   SpO2 99%   BMI 30.57 kg/m²     GENERAL: Awake and alert, in no acute distress.   HEENT: Normocephalic, atraumatic.  PERRL. EOMI. No conjunctival injection. No icterus. Oropharynx clear without evidence of thrush or exudate. No evidence of peridontal disease.    NECK: Supple without nuchal rigidity. No mass.  LYMPH: No cervical, axillary or inguinal lymphadenopathy.  HEART: RRR; No murmur, rubs, gallops.   LUNGS: Clear to auscultation bilaterally without wheezing, rales, rhonchi. Normal respiratory effort. Nonlabored. No dullness.  ABDOMEN: Soft, nontender, nondistended. Positive bowel sounds. No rebound or guarding. NO mass or HSM.  EXT:  No cyanosis, clubbing or edema. No cord.  : Genitalia generally unremarkable.  Without Walker catheter.  MSK: FROM without joint effusions noted arms/legs.    SKIN: Warm and dry without cutaneous eruptions on Inspection/palpation.    NEURO: Oriented to PPT. No focal deficits on motor/sensory exam at arms/legs.  PSYCHIATRIC: Normal insight and judgement. Cooperative with PE    Laboratory Data    Results from last 7 days   Lab Units 12/14/20  0659 12/13/20  1811   WBC 10*3/mm3 11.42* 12.63*   HEMOGLOBIN g/dL 8.2* 8.7*   HEMATOCRIT % 28.5* 30.0*   PLATELETS 10*3/mm3 372 398     Results from last 7 days   Lab Units 12/13/20  1811   SODIUM mmol/L 138   POTASSIUM mmol/L 4.9   CHLORIDE mmol/L 106   CO2 mmol/L 23.0   BUN mg/dL 24*   CREATININE mg/dL 0.53*   GLUCOSE mg/dL 113*   CALCIUM mg/dL 8.9     Results from last 7 days   Lab Units 12/13/20  1811   ALK PHOS U/L 250*   BILIRUBIN mg/dL 0.5   ALT (SGPT) U/L 10   AST (SGOT) U/L 14     Results from last 7 " days   Lab Units 12/13/20  1811   SED RATE mm/hr 75*     Results from last 7 days   Lab Units 12/13/20  2324   CRP mg/dL 3.49*       Estimated Creatinine Clearance: 219.3 mL/min (A) (by C-G formula based on SCr of 0.53 mg/dL (L)).      Microbiology:      Radiology:  Imaging Results (Last 72 Hours)     Procedure Component Value Units Date/Time    XR Foot 3+ View Right [[id number]] Collected: 12/13/20 2015     Updated: 12/13/20 2017    Narrative:      CR Foot Comp Min 3 Vws RT    INDICATION:   Uncontrolled diabetes and peripheral vascular disease. Gangrenous toes. In the emergency department.    COMPARISON:   None available    FINDINGS:   3 views of the right foot.  No acute fracture. Diffuse degenerative change. Remote appearing fracture deformity of the proximal phalanx fifth digit. Diffuse soft tissue swelling, most prominent along the dorsum of the foot, fifth digit and first digit.  There is a small focus of subcutaneous gas dorsal to the distal phalanx first digit on the lateral view. No focal erosive change identified with plain film technique. No foreign body.      Impression:        1. Diffuse soft tissue swelling without distinct erosive change or bone destruction. Small focus of subcutaneous gas adjacent to the distal phalanx first digit.  2. Remote fracture deformity of the proximal phalanx fifth digit.    Signer Name: Bernardo Longoria MD   Signed: 12/13/2020 8:15 PM   [email or web address]    [facility]    XR Chest 1 View [[id number]] Collected: 12/13/20 2011     Updated: 12/13/20 2013    Narrative:      CR Chest 1 Vw    INDICATION:   Weakness shortness of air and uncontrolled type 2 diabetes. Hypertension and congestive heart failure with peripheral vascular disease in the emergency department tonight.     COMPARISON:    11/2/2020    FINDINGS:  Single portable AP view(s) of the chest.  Cardiac silhouette is borderline enlarged and stable. Shallow lung expansion with probable  mild vascular congestion. Interval decrease in volume of left-sided pleural fluid with persistent atelectasis or  pneumonia in the left base. No pneumothorax. There is some subtle pleural thickening or loculated pleural fluid on the left.      Impression:        1. Cardiomegaly and probable mild volume overload. Interval improvement in the appearance of the left lung. No pneumothorax.    Signer Name: Bernardo Longoria MD   Signed: 12/13/2020 8:11 PM   Workstation Name: [email or web address]    [facility]            Impression:      --acute right foot cellulitis, open wound that is likely chronic with black discoloration at all toes,  multifocal crust but he will not admit any specific exposure/trauma. Empiric broad-spectrum antimicrobials ongoing with risk for mixed infection.   High risk for persistent/recurrent or nonhealing wounds, persistent/progressive or recurrent infection and high risk for further functional/limb loss including higher level amputation/chronic pain etc.  Dr. Arriola following for any timing/option of threshold for further surgical intervention.  If he decides to stay in hospital, he should give consideration to further imaging such as MRI To evaluate for deeper tissue involvement, if he is  Able to tolerate.    --acute pyuria/hematuria, risk for recurrent UTI.   Empiric antimicrobials ongoing.  Further adjustments to depend on clinical course a study results and response to therapy        --History multiple pelvic abscesses near prostate in addition to possible splenic microabscesses versus infarct by prior outside hospital CT scan July 2020; prior partial prostatectomy  With concern for chronic prostatitis given multiple past admissions with Klebsiella. repeat CT scan July 16, 2020 with no abscess per radiology.  Lengthy antimicrobial therapy between July/August and CT scan in September 2020 of the description of abscess by radiology. Monitor for potential relapse     --History C.  Difficile; no diarrhea at present.  Monitor for relapse     --History MRSA previously; leg stable with no active soft tissue infection at present.     --history left BKA.     --Peripheral vascular disease     --Diabetes.    --history renal insufficiency 2019 admission as outlined above, concern for multifactorial etiology/multiple medicines/contrast but have avoided IV vancomycin since empirically.  Has tolerated oral vancomycin     --Medical noncompliance; He is threatening to leave AGAINST MEDICAL ADVICE     --possible cirrhosis per imaging per radiology.  Further workup or referral at discretion of medicine team       PLAN: Thank you for asking us to see Kendrick Crystal, I recommend the following:     --Daptomycin/zosyn     --Vancomycin oral solution     --History per family and nursing staff     --Highly complex set of issues with high risk for further serious morbidity and other serious sequela     --Discussed with microbiology     --Check/review labs cultures and scans     --his medical noncompliance remains a significant barrier to care.        --COVID negative       --d/w Dr Arriola    --if he decides to stay in the hospital and receive ongoing care, you should give consideration to further imaging at the foot such as MRI if able to accomplish -v- other;  Threatening to leave AMA    Usman Dong MD  12/14/2020

## 2020-12-14 NOTE — THERAPY EVALUATION
Patient Name: Kendrick Crystal  : 1970    MRN: [id number]                              Today's Date: 2020       Admit Date: 2020    Visit Dx:     ICD-10-CM ICD-9-CM   1. Diabetes mellitus with foot ulcer and gangrene (CMS/[facility])  E11.621 250.70    L97.509 250.80    E11.52 785.4     707.15   2. Cellulitis, scrotum  N49.2 608.4   3. 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 (CMS/[facility])   • Essential hypertension   • Non-compliance   • Hyperlipemia   • Hypertriglyceridemia, essential   • Hypomagnesemia   • Hypertension   • Diabetic foot infection (CMS/[facility])   • Type 2 diabetes mellitus (CMS/[facility])   • History of MRSA infection   • Diabetic ulcer of right lower leg (CMS/[facility])   • S/P BKA (below knee amputation), left (CMS/[facility])   • Peripheral vascular disease (CMS/[facility])   • Abscess of abdominal cavity (CMS/[facility])   • Elevated troponin   • Sepsis (CMS/[facility])   • Leukocytosis   • Urinary retention   • Acute UTI (urinary tract infection)   • Pneumaturia   • Gastroparesis due to secondary diabetes (CMS/[facility])   • History of Clostridioides difficile colitis   • Bipolar disorder (CMS/[facility])   • Type 1 Respiratory Failure   • Diastolic CHF, chronic (CMS/[facility])   • Pleural effusion due to CHF (congestive heart failure) (CMS/[facility])   • Medical non-compliance   • Self neglect   • Fall   • Abscess   • Left thigh pain   • Wound of right leg   • Acute suppurative prostatitis    • Prostate abscess   • Diabetic ketoacidosis without coma associated with type 2 diabetes mellitus (CMS/[facility])   • Clostridium difficile colitis   • Acute on chronic heart failure (CMS/[facility])   • AMS (altered mental status)   • Acute sepsis (CMS/[facility])   • Acute exacerbation of CHF (congestive heart failure) (CMS/[facility])   • Edema leg   • Diabetic foot ulcer (CMS/[facility])   • Decubitus ulcer   • UTI (urinary tract infection) due to urinary indwelling catheter (CMS/[facility])      Past Medical History:   Diagnosis Date   • Abdominal wall cellulitis 5/20/2019   • JUAN (acute kidney injury) (CMS/HCC) 7/29/2018   • Arthritis    • Bipolar 1 disorder (CMS/HCC)    • Cellulitis of right anterior lower leg 07/29/2018    WITH MRSA   • Chronic kidney disease, stage 3    • Cirrhosis (CMS/HCC)    • Counseling for insulin pump     removed   • Depression    • Diabetes mellitus (CMS/HCC)    • Encephalopathy, hepatic (CMS/HCC)    • H/O degenerative disc disease    • History of Lissa's gangrene     right leg/testicle   • Hyperlipemia    • Hypertension    • MRSA infection    • multiple Skin abscesses    • DUNLAP, bx showed stage IV fibrosis    • Neuropathy    • Peripheral vascular disease (CMS/HCC)    • Thrombophlebitis      Past Surgical History:   Procedure Laterality Date   • ABDOMINAL WALL ABSCESS INCISION AND DRAINAGE N/A 4/26/2019    Procedure: ABDOMINAL WALL DEBRIDEMENT;  Surgeon: Fadi Kern MD;  Location:  [facility];  Service: General   • AMPUTATION DIGIT Left 1/30/2017    Procedure: left fourth and fifth transmetatarsal toe amputation ;  Surgeon: Juancho Martinez MD;  Location:  [facility];  Service:    • BELOW KNEE AMPUTATION Left 3/2/2017    Procedure: AMPUTATION BELOW KNEE, SHMUEL;  Surgeon: Juancho Martinez MD;  Location:  [facility];  Service:    • CYSTOSCOPY N/A 10/16/2019    Procedure: CYSTOSCOPY;  Surgeon: Brad Gutierres MD;  Location:  [facility];  Service: Urology   • CYSTOSCOPY TRANSURETHRAL RESECTION OF PROSTATE N/A 6/18/2020    Procedure: CYSTOSCOPY TRANSURETHRAL RESECTION OF PROSTATIC ABCESS;  Surgeon: Mumtaz Paez MD;  Location:  [facility];  Service: Urology;  Laterality: N/A;   • ENDOSCOPY     • HEMORRHOIDECTOMY N/A 8/2/2019    Procedure: EXCISION AND DRAIN PERIRECTAL ABSCESS;  Surgeon: Mumtaz Payne MD;  Location:  [facility];  Service: General   • INCISION AND DRAINAGE LEG Right 5/27/2020    Procedure: LOWER EXTREMITY DEBRIDEMENT RIGHT;  Surgeon: Juancho Martinez MD;   Location: [facility];  Service: General;  Laterality: Right;   • LEG SURGERY     • SIGMOIDOSCOPY N/A 6/11/2020    Procedure: SIGMOIDOSCOPY FLEXIBLE;  Surgeon: Mumtaz Payne MD;  Location: [facility] ENDOSCOPY;  Service: General;  Laterality: N/A;   • TESTICLE SURGERY Right     DEBRIDEMENT FROM GANGRENE   • TONSILLECTOMY  1975     General Information     Row Name 12/14/20 1448          Physical Therapy Time and Intention    Document Type  evaluation  -KG     Mode of Treatment  physical therapy  -KG     Row Name 12/14/20 1448          General Information    Patient Profile Reviewed  yes  -KG     Prior Level of Function  mod assist:;max assist:;transfer;ADL's;dressing;bathing  -KG     Barriers to Rehab  medically complex;previous functional deficit  -KG     Row Name 12/14/20 1448          Living Environment    Lives With  spouse  -KG     Row Name 12/14/20 1448          Home Main Entrance    Number of Stairs, Main Entrance  none  -KG     Row Name 12/14/20 1448          Stairs Within Home, Primary    Number of Stairs, Within Home, Primary  none  -KG     Row Name 12/14/20 1448          Cognition    Orientation Status (Cognition)  oriented to;person;place  -KG     Row Name 12/14/20 1448          Safety Issues, Functional Mobility    Safety Issues Affecting Function (Mobility)  awareness of need for assistance;insight into deficits/self-awareness;safety precaution awareness;safety precautions follow-through/compliance  -KG     Impairments Affecting Function (Mobility)  balance;coordination;endurance/activity tolerance;postural/trunk control;strength  -KG       User Key  (r) = Recorded By, (t) = Taken By, (c) = Cosigned By    Initials Name Provider Type    KG Tammy Velasco, PT Physical Therapist        Mobility     Row Name 12/14/20 1453          Bed Mobility    Bed Mobility  supine-sit  -KG     Supine-Sit [place] (Bed Mobility)  maximum assist (25% patient effort);2 person assist;verbal cues  -KG     Assistive  Device (Bed Mobility)  bed rails;draw sheet;head of bed elevated  -KG     Comment (Bed Mobility)  Pt required maxA x2 to come to upright position at EOB.  -KG     Row Name 12/14/20 1450          Transfers    Comment (Transfers)  Pt declined any STS transfers or transfer to chair despite increased encouragement and education on importance of mobility.  -KG     Row Name 12/14/20 1450          Sit-Stand Transfer    Sit-Stand [place] (Transfers)  unable to assess  -KG     Row Name 12/14/20 1450          Gait/Stairs (Locomotion)    [place] Level (Gait)  unable to assess  -KG     Comment (Gait/Stairs)  Ambulation deferred. Not appropriate to assess.  -KG       User Key  (r) = Recorded By, (t) = Taken By, (c) = Cosigned By    Initials Name Provider Type    Tammy Robison PT Physical Therapist        Obj/Interventions     Row Name 12/14/20 1507          Range of Motion Comprehensive    Comment, General Range of Motion  R and L hip flexors WFL; R and L knee ext WFL  -KG     Row Name 12/14/20 1507          Strength Comprehensive (MMT)    Comment, General Manual Muscle Testing (MMT) Assessment  R hip and knee grossly 3/5  -KG     Row Name 12/14/20 1507          Balance    Balance Assessment  sitting static balance  -KG     Static Sitting Balance  moderate impairment;supported;sitting, edge of bed progressed to periods of CGA, but demonstrated lateral lean to the L  -KG     Row Name 12/14/20 1507          Sensory Assessment (Somatosensory)    Left LE Sensory Assessment  light touch awareness;impaired  -KG     Right LE Sensory Assessment  light touch awareness;impaired  -KG       User Key  (r) = Recorded By, (t) = Taken By, (c) = Cosigned By    Initials Name Provider Type    Tammy Robison, PT Physical Therapist        Goals/Plan     Row Name 12/14/20 1512          Bed Mobility Goal 1 (PT)    Activity/Assistive Device (Bed Mobility Goal 1, PT)  sit to supine;supine to sit  -KG     [place]  Level/Cues Needed (Bed Mobility Goal 1, PT)  moderate assist (50-74% patient effort)  -KG     Time Frame (Bed Mobility Goal 1, PT)  2 weeks  -KG     Progress/Outcomes (Bed Mobility Goal 1, PT)  goal ongoing  -KG     Row Name 12/14/20 1512          Transfer Goal 1 (PT)    Activity/Assistive Device (Transfer Goal 1, PT)  sit-to-stand/stand-to-sit;bed-to-chair/chair-to-bed;walker, rolling  -KG     [place] Level/Cues Needed (Transfer Goal 1, PT)  maximum assist (25-49% patient effort)  -KG     Time Frame (Transfer Goal 1, PT)  2 weeks  -KG     Progress/Outcome (Transfer Goal 1, PT)  goal ongoing  -KG       User Key  (r) = Recorded By, (t) = Taken By, (c) = Cosigned By    Initials Name Provider Type    KG Tammy Velasco, PT Physical Therapist        Clinical Impression     Row Name 12/14/20 1510          Pain    Additional Documentation  Pain Scale: Numbers Pre/Post-Treatment (Group)  -KG     Row Name 12/14/20 1510          Pain Scale: Numbers Pre/Post-Treatment    Pretreatment Pain Rating  0/10 - no pain  -KG     Posttreatment Pain Rating  0/10 - no pain  -KG     Row Name 12/14/20 1510          Plan of Care Review    Plan of Care Reviewed With  patient  -KG     Outcome Summary  PT initial evaluation completed for pt presenting with generalized weakness, balance deficits, and decreased functional mobility. Pt required maxA x2 for bed mobility. Declined any transfers. Pt's decreased independence warrants PT skilled care. Recommend D/C to SNF.  -KG     Row Name 12/14/20 9440          Therapy Assessment/Plan (PT)    Patient/Family Therapy Goals Statement (PT)  return home  -KG     Rehab Potential (PT)  fair, will monitor progress closely  -KG     Criteria for Skilled Interventions Met (PT)  yes;skilled treatment is necessary  -KG     Row Name 12/14/20 1519          Vital Signs    Pre Systolic BP Rehab  142  -KG     Pre Treatment Diastolic BP  77  -KG     Pretreatment Heart Rate (beats/min)  67  -KG      Posttreatment Heart Rate (beats/min)  67  -KG     Pre SpO2 (%)  90  -KG     O2 Delivery Pre Treatment  room air  -KG     Post SpO2 (%)  89  -KG     O2 Delivery Post Treatment  room air  -KG     Pre Patient Position  Side Lying  -KG     Intra Patient Position  Sitting  -KG     Post Patient Position  Side Lying  -KG     Row Name 12/14/20 1510          Positioning and Restraints    Pre-Treatment Position  in bed  -KG     Post Treatment Position  bed  -KG     In Bed  notified nsg;side lying left;call light within reach;encouraged to call for assist;with family/caregiver  -KG       User Key  (r) = Recorded By, (t) = Taken By, (c) = Cosigned By    Initials Name Provider Type    Tammy Robison PT Physical Therapist        Outcome Measures     Row Name 12/14/20 1512          How much help from another person do you currently need...    Turning from your back to your side while in flat bed without using bedrails?  2  -KG     Moving from lying on back to sitting on the side of a flat bed without bedrails?  2  -KG     Moving to and from a bed to a chair (including a wheelchair)?  1  -KG     Standing up from a chair using your arms (e.g., wheelchair, bedside chair)?  2  -KG     Climbing 3-5 steps with a railing?  1  -KG     To walk in hospital room?  1  -KG     AM-PAC 6 Clicks Score (PT)  9  -KG     Row Name 12/14/20 1512          Functional Assessment    Outcome Measure Options  AM-PAC 6 Clicks Basic Mobility (PT)  -KG       User Key  (r) = Recorded By, (t) = Taken By, (c) = Cosigned By    Initials Name Provider Type    Tammy Robison PT Physical Therapist        Physical Therapy Education                 Title: PT OT SLP Therapies (In Progress)     Topic: Physical Therapy (In Progress)     Point: Mobility training (In Progress)     Learning Progress Summary           Patient Acceptance, E, NR by KG at 12/14/2020 1038                   Point: Home exercise program (Not Started)     Learner Progress:  Not  documented in this visit.          Point: Body mechanics (In Progress)     Learning Progress Summary           Patient Acceptance, E, NR by KG at 12/14/2020 1038                   Point: Precautions (In Progress)     Learning Progress Summary           Patient Acceptance, E, NR by KG at 12/14/2020 1038                               User Key     Initials Effective Dates Name Provider Type [facility] [place] 05/22/20 -  Tammy Velasco, PT Physical Therapist PT              PT Recommendation and Plan  Planned Therapy Interventions (PT): balance training, bed mobility training, strengthening, transfer training  Plan of Care Reviewed With: patient  Outcome Summary: PT initial evaluation completed for pt presenting with generalized weakness, balance deficits, and decreased functional mobility. Pt required maxA x2 for bed mobility. Declined any transfers. Pt's decreased independence warrants PT skilled care. Recommend D/C to SNF.     Time Calculation:   PT Charges     Row Name 12/14/20 [facility] 12/14/20 0915          Time Calculation    Start Time  1038  -KG  0915  -     PT Received On  12/14/20  -KG  --     PT Goal Re-Cert Due Date  12/24/20  -  12/24/20  -        Time Calculation- PT    Total Timed Code Minutes- PT  10 minute(s)  -KG  --        Timed Charges    85791 - PT Therapeutic Activity Minutes  10  -KG  --       User Key  (r) = Recorded By, (t) = Taken By, (c) = Cosigned By    Initials Name Provider Type     Usman Johnson, PT Physical Therapist    KG Tammy Velasco, PT Physical Therapist        Therapy Charges for Today     Code Description Service Date Service Provider Modifiers Qty    46635288044 HC PT THERAPEUTIC ACT EA 15 MIN 12/14/2020 Tammy Velasco, PT GP 1    25940192799 HC PT THER SUPP EA 15 MIN 12/14/2020 Tammy Velasco, PT GP 1          PT G-Codes  Outcome Measure Options: AM-PAC 6 Clicks Basic Mobility (PT)  AM-PAC 6 Clicks Score (PT): 9    Miriam Velasco  PT  12/14/2020

## 2020-12-14 NOTE — PLAN OF CARE
Goal Outcome Evaluation:  Plan of Care Reviewed With: patient  Progress: no change  Outcome Summary: VSS. Room air, 2L NC while sleeping. 1st degree AV block with occasional PVC's. Patient has been NPO since midnight. Patient admitted with several wounds to RLE, wounds to L residual limb area, skin tear on buttocks, blanchable redness on buttocks, excoriation of groin, swollen testicles, wound to penis with discharge. Patient admitted with chronic childs left in place per order until urology consulted. Troponin elevated, but consistent with previous levels. ProBNP elevated. Patient refused waffle mattress, boots, and positioning changes. Educated patient on importance of prventing further skin breakdown. Patient would benefit from a room with a lift. Consults today with cardiothoracic surgery, ID, WOC, Diabetes education, palliative, and urology,

## 2020-12-14 NOTE — NURSING NOTE
"WOC consult for RLE, LLE wounds and skin breakdown at groin site:    Patient presents with multiple venous ulcerations on bilateral lower extremities. PT wound care also consulted, and present at time of assessment for treatment of BLE ulcerations.  Patient also has a chronic childs which has eroded his meatus.  Malodorous, purulent drainage noted at childs catheter site. RN aware.    Further assessment of patient revealed a venous ulcer on the right posterior thigh measuring 1x2x0.2 cm.  Wound bed was pink and blanchable.     Will order xeroform dressing BID and covered with optifoam dressing.    Patient also has large, yeast associated rash in his groin area.    Will order micotin powder BID.  See order for further details.    Patient refused a specialty bed when recommeneded.  Explained the importance due to his skin breakdown, but stated that he \"didn't need the mattress because he was leaving today.\"    All other skin interventions in place.    Thank you for your consult.  WOC will continue to follow. Please contact if further needs arise.          "

## 2020-12-14 NOTE — H&P
[facility] Medicine Services  HISTORY AND PHYSICAL    Patient Name: Kendrick Crystal  : 1970  MRN: [id number]  Primary Care Physician: Stanley Carrasco DO  Date of admission: 2020      Subjective   Subjective     Chief Complaint:  Foot wound    HPI:  Kendrick Crystal is a 50 y.o. male with a PMH significant for diastolic CHF, bipolar disorder, history of diastolic CHF, history of urinary retention with indwelling Walker catheter, PVD status post left BKA, diabetes mellitus type 2, HTN, HLD, DUNLAP cirrhosis who presents to the ED due to right foot wounds.  Wife at bedside contributes majority of HPI.  Wife states that patient has had ongoing chronic right foot wound for several months.  Over the past few weeks the wounds have worsened.  He has been in hospice care and has not sought treatment for foot wounds.  Wife states that last week they discontinued hospice due to desire to treat right foot wound.  They presented to Dr. Arriola with CT surgery this past Friday who recommended aggressive wound, IV antibiotics and infectious disease consultation.  Wife reports low-grade fever at home with nausea and dry heaves.  While in hospice care around 1 month ago a Walker catheter was placed.  Wife states it has not been changed since that time.  No reports of diarrhea, cough, shortness of breath.      Current COVID Risks are:  [] Fever []  Cough [] Shortness of breath [] Fatigue [] Change in taste or smell    [] Exposure to COVID positive patient  [] High risk facility   [x]  NONE    Review of Systems   Constitutional: Positive for appetite change.   HENT: Negative.    Eyes: Negative.    Respiratory: Negative.    Cardiovascular: Negative.    Gastrointestinal: Positive for nausea.   Endocrine: Negative.         All other systems reviewed and are negative.     Personal History     Past Medical History:   Diagnosis Date   • Abdominal wall cellulitis 2019   • JUAN (acute kidney injury)  (CMS/HCC) 7/29/2018   • Arthritis    • Bipolar 1 disorder (CMS/HCC)    • Cellulitis of right anterior lower leg 07/29/2018    WITH MRSA   • Chronic kidney disease, stage 3    • Cirrhosis (CMS/HCC)    • Counseling for insulin pump     removed   • Depression    • Diabetes mellitus (CMS/HCC)    • Encephalopathy, hepatic (CMS/HCC)    • H/O degenerative disc disease    • History of Lissa's gangrene     right leg/testicle   • Hyperlipemia    • Hypertension    • MRSA infection    • multiple Skin abscesses    • DUNLAP, bx showed stage IV fibrosis    • Neuropathy    • Peripheral vascular disease (CMS/HCC)    • Thrombophlebitis        Past Surgical History:   Procedure Laterality Date   • ABDOMINAL WALL ABSCESS INCISION AND DRAINAGE N/A 4/26/2019    Procedure: ABDOMINAL WALL DEBRIDEMENT;  Surgeon: Fadi Kern MD;  Location:  [facility];  Service: General   • AMPUTATION DIGIT Left 1/30/2017    Procedure: left fourth and fifth transmetatarsal toe amputation ;  Surgeon: Juancho Martinez MD;  Location:  [facility];  Service:    • BELOW KNEE AMPUTATION Left 3/2/2017    Procedure: AMPUTATION BELOW KNEE, SHMUEL;  Surgeon: Juancho Martinez MD;  Location:  [facility];  Service:    • CYSTOSCOPY N/A 10/16/2019    Procedure: CYSTOSCOPY;  Surgeon: Brad Gutierres MD;  Location:  [facility];  Service: Urology   • CYSTOSCOPY TRANSURETHRAL RESECTION OF PROSTATE N/A 6/18/2020    Procedure: CYSTOSCOPY TRANSURETHRAL RESECTION OF PROSTATIC ABCESS;  Surgeon: Mumtaz Paez MD;  Location:  [facility];  Service: Urology;  Laterality: N/A;   • ENDOSCOPY     • HEMORRHOIDECTOMY N/A 8/2/2019    Procedure: EXCISION AND DRAIN PERIRECTAL ABSCESS;  Surgeon: Mumtaz Payne MD;  Location: [facility];  Service: General   • INCISION AND DRAINAGE LEG Right 5/27/2020    Procedure: LOWER EXTREMITY DEBRIDEMENT RIGHT;  Surgeon: Juancho Martinez MD;  Location: [facility];  Service: General;  Laterality: Right;   • LEG SURGERY     • SIGMOIDOSCOPY N/A 6/11/2020     Procedure: SIGMOIDOSCOPY FLEXIBLE;  Surgeon: Mumtaz Payne MD;  Location: [facility] END[facility]COPY;  Service: General;  Laterality: N/A;   • TESTICLE SURGERY Right     DEBRIDEMENT FROM GANGRENE   • TONSILLECTOMY  1975       Family History: family history includes Arthritis in his father and mother; Diabetes in his brother and father; Heart attack in his father; Hyperlipidemia in his father; Hypertension in his brother, father, and mother; Kidney disease in his mother; Mental illness in his sister; Obesity in his brother; Stroke in his mother. Otherwise pertinent FHx was reviewed and unremarkable.     Social History:  reports that he quit smoking about 2 years ago. His smoking use included cigars. He quit after 10.00 years of use. He has never used smokeless tobacco. He reports that he does not drink alcohol or use drugs.  Social History     Social History Narrative    Lives in [facility] with spouse. Has caregiver daily       Medications:  Available home medication information reviewed.  (Not in a hospital admission)      No Known Allergies    Objective   Objective     Vital Signs:   Temp:  [97 °F (36.1 °C)] 97 °F (36.1 °C)  Heart Rate:  [74-83] 75  Resp:  [16] 16  BP: (160-181)/() 173/94  Flow (L/min):  [3] 3       Physical Exam   Constitutional: Awake, alert  Eyes: PERRLA, sclerae anicteric, no conjunctival injection  HENT: NCAT, mucous membranes moist  Neck: Supple, no thyromegaly, no lymphadenopathy, trachea midline  Respiratory: Clear to auscultation bilaterally, nonlabored respirations   Cardiovascular: RRR, no murmurs, rubs, or gallops, unable to palpate pedal pulse on the right  Gastrointestinal: Positive bowel sounds, soft, nontender, nondistended  Genitourinary: Walker catheter in place, penis edematous, erythematous with thick malodorous discharge draining from meatus surrounding catheter  Musculoskeletal: 2+ pitting edema on the right, no clubbing or cyanosis to extremities.  Left BKA  Psychiatric:  Appropriate affect, cooperative  Neurologic: Oriented x 3, strength symmetric in all extremities, Cranial Nerves grossly intact to confrontation, speech clear  Skin: Multiple ulcerations to top of right foot with cloudy drainage and surrounding edema.      Results Reviewed:  I have personally reviewed most recent indicated data and agree with findings including:  [x]  Laboratory  [x]  Radiology  [x]  EKG/Telemetry  []  Pathology  []  Cardiac/Vascular Studies  [x]  Old records  []  Other:  Most pertinent findings include: X-ray showing subcutaneous gas to the right foot      LAB RESULTS:  Results from last 7 days   Lab 12/13/20  1811   WBC 12.63*   HEMOGLOBIN 8.7*   HEMATOCRIT 30.0*   PLATELETS 398   NEUTROS ABS 9.50*   IMMATURE GRANS (ABS) 0.04   LYMPHS ABS 1.46   MONOS ABS 0.98*   EOS ABS 0.54*   MCV 75.4*   PROCALCITONIN 0.13   LACTATE 0.9     Results from last 7 days   Lab 12/13/20  1811   SODIUM 138   POTASSIUM 4.9   CHLORIDE 106   CO2 23.0   ANION GAP 9.0   BUN 24*   CREATININE 0.53*   GLUCOSE 113*   CALCIUM 8.9   MAGNESIUM 1.8     Results from last 7 days   Lab 12/13/20  1811   TOTAL PROTEIN 7.0   ALBUMIN 3.00*   GLOBULIN 4.0   ALT (SGPT) 10   AST (SGOT) 14   BILIRUBIN 0.5   ALK PHOS 250*   LIPASE 15     Results from last 7 days   Lab 12/13/20  1811   PROBNP 23,666.0*   TROPONIN T 0.145*                 Brief Urine Lab Results  (Last result in the past 365 days)      Color   Clarity   Blood   Leuk Est   Nitrite   Protein   CREAT   Urine HCG        09/28/20 0237 Yellow Turbid Moderate (2+) Large (3+) Negative 100 mg/dL (2+)             Microbiology Results (last 10 days)     ** No results found for the last 240 hours. **        Imaging Results (Last 24 Hours)     Procedure Component Value Units Date/Time    XR Foot 3+ View Right [[id number]] Collected: 12/13/20 2015     Updated: 12/13/20 2017    Narrative:      CR Foot Comp Min 3 Vws RT    INDICATION:   Uncontrolled diabetes and peripheral vascular disease.  Gangrenous toes. In the emergency department.    COMPARISON:   None available    FINDINGS:   3 views of the right foot.  No acute fracture. Diffuse degenerative change. Remote appearing fracture deformity of the proximal phalanx fifth digit. Diffuse soft tissue swelling, most prominent along the dorsum of the foot, fifth digit and first digit.  There is a small focus of subcutaneous gas dorsal to the distal phalanx first digit on the lateral view. No focal erosive change identified with plain film technique. No foreign body.      Impression:        1. Diffuse soft tissue swelling without distinct erosive change or bone destruction. Small focus of subcutaneous gas adjacent to the distal phalanx first digit.  2. Remote fracture deformity of the proximal phalanx fifth digit.    Signer Name: Bernardo Longoria MD   Signed: 12/13/2020 8:15 PM   Workstation Name: [facility]    Radiology [facility]    XR Chest 1 View [[id number]] Collected: 12/13/20 2011     Updated: 12/13/20 2013    Narrative:      CR Chest 1 Vw    INDICATION:   Weakness shortness of air and uncontrolled type 2 diabetes. Hypertension and congestive heart failure with peripheral vascular disease in the emergency department tonight.     COMPARISON:    11/2/2020    FINDINGS:  Single portable AP view(s) of the chest.  Cardiac silhouette is borderline enlarged and stable. Shallow lung expansion with probable mild vascular congestion. Interval decrease in volume of left-sided pleural fluid with persistent atelectasis or  pneumonia in the left base. No pneumothorax. There is some subtle pleural thickening or loculated pleural fluid on the left.      Impression:        1. Cardiomegaly and probable mild volume overload. Interval improvement in the appearance of the left lung. No pneumothorax.    Signer Name: Bernardo Longoria MD   Signed: 12/13/2020 8:11 PM   Workstation Name: [employer]    Radiology [facility]        Results for orders  placed during the hospital encounter of 07/07/20   Adult Transthoracic Echo Complete W/ Cont if Necessary Per Protocol    Narrative · Estimated EF = 50%.  · Left ventricular systolic function is mildly decreased.  · There is calcification of the aortic valve mainly affecting the non   coronary cusp(s).  · Aortic valve maximum pressure gradient is 11.7 mmHg.          Assessment/Plan   Assessment & Plan     Active Hospital Problems    Diagnosis POA   • **Diabetic foot ulcer (CMS/[facility]) [E11.621, L97.509] Yes   • Decubitus ulcer [L89.90] Yes   • UTI (urinary tract infection) due to urinary indwelling catheter (CMS/[facility]) [T83.511A, N39.0] Unknown   • Diastolic CHF, chronic (CMS/[facility]) [I50.32] Yes   • Bipolar disorder (CMS/[facility]) [F31.9] Yes   • History of Clostridioides difficile colitis [Z86.19] Not Applicable   • Urinary retention [R33.9] Yes   • Elevated troponin [R77.8] Yes   • Peripheral vascular disease (CMS/[facility]) [I73.9] Yes   • S/P BKA (below knee amputation), left (CMS/[facility]) [Z89.512] Not Applicable   • History of MRSA infection [Z86.14] Yes   • Type 2 diabetes mellitus (CMS/[facility]) [E11.9] Unknown   • Hypomagnesemia [E83.42] Yes   • Essential hypertension [I10] Yes   • Hyperlipemia [E78.5] Yes   • Liver cirrhosis secondary to DUNLAP (CMS/[facility]) [K75.81, K74.60] Yes   This is a 50-year-old male patient with a PMH significant for diastolic CHF, bipolar disorder, history of CHF, history of urinary retention with indwelling Walker catheter, PVD status post left BKA, diabetes mellitus type 2, HTN, HLD, DUNLAP cirrhosis who presents to the ED due to right foot wound.    Right diabetic foot wound  -Concerns for gas gangrene  -Dr. Arriola with CT surgery consult for a.m.  -ID consult for a.m.  -Wound and ostomy care  -Vancomycin, Zosyn, clindamycin  -Blood cultures pending  -Fall precautions  -N.p.o. after midnight pending evaluation by CT surgery  -History of C. difficile, continue with Florastor    Probable UTI  -On physical exam large  amount of malodorous purulent drainage coming from meatus  -UA pending  -Culture of fluid obtained in ED  -Due severe skin breakdown to penis, defer changing Walker catheter tonight  -Consult to urology for Walker catheter recommendations   -History of urinary retention  -Vancomycin and Zosyn in place    Hypomagnesemia  -Electrolyte replacement protocol  -A.m. labs    Diabetes mellitus type 2  -Continue with reduced dose of long-acting insulin  -SSI with Accu-Cheks    Diastolic CHF  -We will give single dose of IV Lasix tonight  -Strict intake and output  -Daily weights  -Resume home p.o. Lasix for a.m.    Decubitus ulcer  -Present on admission  -Wound and ostomy care    Elevated troponin  -Chronically elevated  -Trend x1  -Consider further work-up if trending up    History of DUNLAP cirrhosis  Hypertension  HLD  Bipolar disorder  -Continue with home meds    Home med list reviewed    DVT prophylaxis: Subcutaneous heparin      CODE STATUS: DNR/DNI  Code Status and Medical Interventions:   Ordered at: 12/13/20 5424     Limited Support to NOT Include:    Cardioversion/Defibrillation    Intubation     Level Of Support Discussed With:    Patient     Code Status:    No CPR     Medical Interventions (Level of Support Prior to Arrest):    Limited       Admission Status:  I believe this patient meets INPATIENT status due to diabetic foot wound requiring inpatient IV antibiotics.  I feel patient’s risk for adverse outcomes and need for care warrant INPATIENT evaluation and I predict the patient’s care encounter to likely last beyond 2 midnights.      Alma Gonzalez, DO  12/13/20

## 2020-12-14 NOTE — PROGRESS NOTES
Cardiothoracic Surgery Progress Note      POD #:     LOS: 1 day      Subjective: Please refer to my office note of December 11, 2020 for recent developments regarding his right lower extremity and venous stasis ulcerations.  Apparently after going home on 12/11/2020 had not been admitted to hospital which we had arranged he then developed fever and other symptoms and his wife brought him back to emergency department and was admitted last evening.  On seeing him this morning he states he is going home.    Objective:  Vital Signs vital signs below noted T-max past 2497.9 degrees  Temp:  [97 °F (36.1 °C)-97.9 °F (36.6 °C)] 97.8 °F (36.6 °C)  Heart Rate:  [63-83] 64  Resp:  [16] 16  BP: (134-181)/() 134/88    Physical Exam:   General Appearance: Awake and alert.  On walking into the room he was talking on the telephone    Lungs:   Heart:   Skin: No dressing over the right lower extremity.  Venous stasis ulcerations on toes and on the anterior and posterior surface of the lower extremity.   Incision:     Results: Laboratory results below noted white blood cell count 12,600  Results from last 7 days   Lab Units 12/13/20  1811   WBC 10*3/mm3 12.63*   HEMOGLOBIN g/dL 8.7*   HEMATOCRIT % 30.0*   PLATELETS 10*3/mm3 398     Results from last 7 days   Lab Units 12/13/20  1811   SODIUM mmol/L 138   POTASSIUM mmol/L 4.9   CHLORIDE mmol/L 106   CO2 mmol/L 23.0   BUN mg/dL 24*   CREATININE mg/dL 0.53*   GLUCOSE mg/dL 113*   CALCIUM mg/dL 8.9         Assessment: #1.  Ulceration of all the toes right foot secondary to venous stasis disease.  2.  Status post remote left below-knee amputation well-healed.      Plan: If the patient wants to go home send him home AMA.  I have asked PT wound care to evaluate the patient this morning.      Murtaza Arriola MD - 12/14/20 - 07:47 EST

## 2020-12-14 NOTE — CONSULTS
Attempted to see patient for diabetes education. Current Epic records indicate patient has discharged. Thank you for this referral , please re consult should patient return.

## 2020-12-15 NOTE — PROGRESS NOTES
Case Management Discharge Note      Final Note: Pt left AMA    Provided Post Acute Provider List?: N/A  Provided Post Acute Provider Quality & Resource List?: N/A              Final Discharge Disposition Code: 07 - left AMA

## 2020-12-20 PROBLEM — N48.89 PENILE EROSION: Status: ACTIVE | Noted: 2020-01-01

## 2020-12-20 PROBLEM — S71.102D OPEN THIGH WOUND, LEFT, SUBSEQUENT ENCOUNTER: Status: ACTIVE | Noted: 2020-01-01

## 2020-12-20 PROBLEM — Z97.8 CHRONIC INDWELLING FOLEY CATHETER: Status: ACTIVE | Noted: 2020-01-01

## 2022-10-22 NOTE — TELEPHONE ENCOUNTER
10/26/2020 5:57 PM  Either come into office to be seen by Katrina or TARUN SWANSON.     [facility]     ---------------------------------    Called pt's wife, Cindy, and let her know what Dr. Martinez said. Pt would prefer coming in to see Dr. Martinez and not go to the ED. Scheduled patient for 11/3/2020 at 9:00am. Advised patient wife to call with any further questions or concerns that they may have before the appt. She verbalized understanding.      [de-identified] : As per mom he is doing much better, he is not complaining any more

## 2023-04-27 NOTE — OUTREACH NOTE
"TEAGAN received a call from Amanda with [facility] regarding referral send on 4/26/23.     [email or web address] Phone: [phone]. Patient was denied due to "  Do Not Offer Required Service".      TEAGAN followed up on referral sent to "[facility] Phone: [phone]. Patient was denial " Not a Covered Benefit". TEAGAN called the facility and spoke with Nydia Nunez and updated her on patient's insurance as it is now reflecting the following 1) Primary: MEDICAID/HE[place]Y BLUE [place] and secondary: BLUE CROSS BLUE SHIELD/BCBS FEDERAL STANDARD. Teagan was informed that they will reopen patient's case and someone will contact me afterwards. Sw will await a call.       " Medical Week 2 Survey      Responses   Facility patient discharged from?  [facility]   Does the patient have one of the following disease processes/diagnoses(primary or secondary)?  Other   Week 2 attempt successful?  Yes   Call start time  1535   Discharge diagnosis  Intractable vomiting with nausea, elevated troponin, abdominal wall cellulitis, DM II uncontrolle, PVD, essential HTN, DUNLAP cirrhosis   Call end time  1539   Is patient permission given to speak with other caregiver?  Yes   List who call center can speak with  Cindy, spouse   Person spoke with today (if not patient) and relationship  Cindy, spouse   Meds reviewed with patient/caregiver?  Yes   Is the patient taking all medications as directed (includes completed medication regime)?  Yes   Medication comments  Wife states that PCP made adjustments yesterday with sliding scale insulin, decreased diuretic and increased blood pressure medication.    Does the patient have a primary care provider?   Yes   Does the patient have an appointment with their PCP within 7 days of discharge?  Yes   Comments regarding PCP  Keyanna Leone APRN    Has the patient kept scheduled appointments due by today?  -- [Wife states that she is awaiting callback from Dr Dong's office with appt. ]   What is the Home health agency?   [facility]   Home health comments  Active HH.    Psychosocial issues?  No   Did the patient receive a copy of their discharge instructions?  Yes   Nursing interventions  Reviewed instructions with patient   What is the patient's perception of their health status since discharge?  Improving   Is the patient/caregiver able to teach back signs and symptoms related to disease process for when to call PCP?  Yes   Is the patient/caregiver able to teach back signs and symptoms related to disease process for when to call 911?  Yes   Is the patient/caregiver able to teach back the hierarchy of who to call/visit for symptoms/problems? PCP, Specialist,  Home health nurse, Urgent Care, ED, 911  Yes   Week 2 Call Completed?  Yes          Veronica Devine RN
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
## (undated) DEVICE — AMD ANTIMICROBIAL GAUZE SPONGES,12 PLY USP TYPE VII, 0.2% POLYHEXAMETHYLENE BIGUANIDE HCI (PHMB): Brand: CURITY

## (undated) DEVICE — SPNG GZ WOVN 4X4IN 12PLY 10/BX STRL

## (undated) DEVICE — GLV SURG SENSICARE W/ALOE PF LF 7 STRL

## (undated) DEVICE — LP CUT RT/ANGL 24FR

## (undated) DEVICE — DRAINBAG,ANTI-REFLUX TOWER,L/F,2000ML,LL: Brand: MEDLINE

## (undated) DEVICE — PK MINOR SPLT 10

## (undated) DEVICE — PAD,ABDOMINAL,8"X10",ST,LF: Brand: MEDLINE

## (undated) DEVICE — SHEET, DRAPE, SPLIT, STERILE: Brand: MEDLINE

## (undated) DEVICE — ANTIBACTERIAL UNDYED BRAIDED (POLYGLACTIN 910), SYNTHETIC ABSORBABLE SUTURE: Brand: COATED VICRYL

## (undated) DEVICE — GAUZE FLUFF 1 PLY: Brand: DEROYAL

## (undated) DEVICE — "MH-438 A/W VLVE F/140 EVIS-140": Brand: AIR/WATER VALVE

## (undated) DEVICE — PAD GRND REM POLYHESIVE A/ DISP

## (undated) DEVICE — HLDR CATH FOLEY STATLOCK TRICOT PED 3WY

## (undated) DEVICE — GLV SURG SENSICARE PI MIC PF SZ8 LF STRL

## (undated) DEVICE — TP CLTH MEDIPORE SFT 1IN 10YD

## (undated) DEVICE — CVR HNDL LT SURG ACCSSRY BLU STRL

## (undated) DEVICE — SYR LUERLOK 20CC

## (undated) DEVICE — BANDAGE,GAUZE,BULKEE II,4.5"X4.1YD,STRL: Brand: MEDLINE

## (undated) DEVICE — APPL CHLORAPREP W/TINT 26ML BLU

## (undated) DEVICE — DRSNG WND GZ CURAD OIL EMULSION 3X3IN STRL

## (undated) DEVICE — NITINOL WIRE WITH HYDROPHILIC TIP: Brand: SENSOR

## (undated) DEVICE — EVAC BLDR UROVAC W ADAPT

## (undated) DEVICE — ENDOGATOR HYBRID TUBING KIT FOR USE WITH ENDOGATOR IRRIGATION PUMP, OLYMPUS PUMP, GI4000 ESU, AND TORRENT IRRIGATION PUMP.: Brand: ENDOGATOR KIT

## (undated) DEVICE — TUBING, SUCTION, 1/4" X 10', STRAIGHT: Brand: MEDLINE

## (undated) DEVICE — SOL BETADINE 1GL

## (undated) DEVICE — STRAP STIRUP WO/RNG 19X3.5IN DISP

## (undated) DEVICE — SYR CONTRL LUERLOK 10CC

## (undated) DEVICE — GLV SURG SENSICARE MICRO PF LF 7 STRL

## (undated) DEVICE — PK EXTREM LOWR 10

## (undated) DEVICE — SUCTION CANISTER, 2500CC, RIGID: Brand: DEROYAL

## (undated) DEVICE — DRSNG ELAS NET STRCH SZ5 12IN 25YD LF

## (undated) DEVICE — DRAPE,TOP,102X53,STERILE: Brand: MEDLINE

## (undated) DEVICE — SYR LUERLOK 50ML

## (undated) DEVICE — CATH FOL BARDEX HEMATURIA 3WY 22F 30CC

## (undated) DEVICE — Device: Brand: AIR/WATER CHANNEL CLEANING ADAPTER

## (undated) DEVICE — CORD BOVIE 1P/U

## (undated) DEVICE — GOWN,PREVENTION PLUS,XXLARGE,STERILE: Brand: MEDLINE

## (undated) DEVICE — SHEET,DRAPE,40X58,STERILE: Brand: MEDLINE

## (undated) DEVICE — DRSNG SURESITE WNDW 2.38X2.75

## (undated) DEVICE — DRSNG WND GZ CURAD OIL EMULSION 3X8IN STRL PK/3

## (undated) DEVICE — DRSNG PAD ABD 8X10IN STRL

## (undated) DEVICE — JELLY,LUBE,STERILE,FLIP TOP,TUBE,2-OZ: Brand: MEDLINE

## (undated) DEVICE — IRRIGATOR TOOMEY 70CC

## (undated) DEVICE — PAD ARMBRD SURG CONVOL 7.5X20X2IN

## (undated) DEVICE — CONTN GRAD MEAS TRIANG 32OZ BLK

## (undated) DEVICE — DRAPE,UNDERBUTTOCKS,STERILE: Brand: MEDLINE

## (undated) DEVICE — DRSNG WND VAC GRANUFOAM SENSATRAC LG

## (undated) DEVICE — TP PAPR MICROPORE 2IN

## (undated) DEVICE — LEX GENERAL ABDOMINAL SPLIT: Brand: MEDLINE INDUSTRIES, INC.

## (undated) DEVICE — "MH-443 SUCTION VALVE F/EVIS140 EVIS160": Brand: SUCTION VALVE

## (undated) DEVICE — MEDI-VAC YANKAUER SUCTION HANDLE W/BULBOUS TIP: Brand: CARDINAL HEALTH

## (undated) DEVICE — ENCORE® LATEX MICRO SIZE 8, STERILE LATEX POWDER-FREE SURGICAL GLOVE: Brand: ENCORE

## (undated) DEVICE — HDRST POSTIN FM CRDL TRACH SLOT 9X8X4IN

## (undated) DEVICE — LEGGINGS, PAIR, 29X43, STERILE: Brand: MEDLINE

## (undated) DEVICE — 3M™ RANGER™ BLOOD/FLUID WARMING STANDARD FLOW SET, 24200, 10/CASE: Brand: 3M™ RANGER™

## (undated) DEVICE — PK CYSTO-TUR BASIC 10

## (undated) DEVICE — MEDI-VAC NON-CONDUCTIVE SUCTION TUBING: Brand: CARDINAL HEALTH

## (undated) DEVICE — PLUS HANDPIECE WITH MULTIPLE-ORIFICE TIP WITH SOFT CONE SPLASH SHIELD: Brand: SURGILAV

## (undated) DEVICE — INTENDED FOR TISSUE SEPARATION, AND OTHER PROCEDURES THAT REQUIRE A SHARP SURGICAL BLADE TO PUNCTURE OR CUT.: Brand: BARD-PARKER ® STAINLESS STEEL BLADES

## (undated) DEVICE — SUT MNCRYL PLS ANTIB UD 4/0 PS2 18IN

## (undated) DEVICE — AIRWY SZ11

## (undated) DEVICE — 2000CC GUARDIAN II: Brand: GUARDIAN

## (undated) DEVICE — SOL NACL 0.9PCT 1000ML

## (undated) DEVICE — HANDPIECE SET WITH HIGH FLOW TIP AND SUCTION TUBE: Brand: INTERPULSE

## (undated) DEVICE — GLV SURG SENSICARE MICRO PF LF 7.5 STRL

## (undated) DEVICE — GLV SURG SENSICARE PI MIC PF SZ7 LF STRL

## (undated) DEVICE — NDL SPINE 22G 31/2IN BLK

## (undated) DEVICE — NDL HYPO ECLPS SFTY 22G 1 1/2IN